# Patient Record
Sex: MALE | Race: WHITE | NOT HISPANIC OR LATINO | URBAN - METROPOLITAN AREA
[De-identification: names, ages, dates, MRNs, and addresses within clinical notes are randomized per-mention and may not be internally consistent; named-entity substitution may affect disease eponyms.]

---

## 2017-02-02 ENCOUNTER — INPATIENT (INPATIENT)
Facility: HOSPITAL | Age: 62
LOS: 25 days | Discharge: NURSING, REHAB OR LONG-TERM CARE | DRG: 291 | End: 2017-02-28
Attending: INTERNAL MEDICINE | Admitting: INTERNAL MEDICINE
Payer: COMMERCIAL

## 2017-02-02 VITALS
TEMPERATURE: 98 F | RESPIRATION RATE: 22 BRPM | SYSTOLIC BLOOD PRESSURE: 194 MMHG | WEIGHT: 315 LBS | OXYGEN SATURATION: 93 % | DIASTOLIC BLOOD PRESSURE: 74 MMHG | HEIGHT: 71.5 IN | HEART RATE: 69 BPM

## 2017-02-02 DIAGNOSIS — R06.02 SHORTNESS OF BREATH: ICD-10-CM

## 2017-02-02 DIAGNOSIS — Z95.5 PRESENCE OF CORONARY ANGIOPLASTY IMPLANT AND GRAFT: Chronic | ICD-10-CM

## 2017-02-02 DIAGNOSIS — Z98.89 OTHER SPECIFIED POSTPROCEDURAL STATES: Chronic | ICD-10-CM

## 2017-02-02 LAB
ACETONE SERPL-MCNC: NEGATIVE — SIGNIFICANT CHANGE UP
ALBUMIN SERPL ELPH-MCNC: 3.2 G/DL — LOW (ref 3.3–5)
ALP SERPL-CCNC: 95 U/L — SIGNIFICANT CHANGE UP (ref 40–120)
ALT FLD-CCNC: 11 U/L RC — SIGNIFICANT CHANGE UP (ref 10–45)
ANION GAP SERPL CALC-SCNC: 13 MMOL/L — SIGNIFICANT CHANGE UP (ref 5–17)
APTT BLD: 63.3 SEC — HIGH (ref 27.5–37.4)
AST SERPL-CCNC: 16 U/L — SIGNIFICANT CHANGE UP (ref 10–40)
BASOPHILS # BLD AUTO: 0 K/UL — SIGNIFICANT CHANGE UP (ref 0–0.2)
BASOPHILS NFR BLD AUTO: 0.3 % — SIGNIFICANT CHANGE UP (ref 0–2)
BILIRUB SERPL-MCNC: 0.1 MG/DL — LOW (ref 0.2–1.2)
BUN SERPL-MCNC: 86 MG/DL — HIGH (ref 7–23)
CALCIUM SERPL-MCNC: 8.8 MG/DL — SIGNIFICANT CHANGE UP (ref 8.4–10.5)
CHLORIDE SERPL-SCNC: 104 MMOL/L — SIGNIFICANT CHANGE UP (ref 96–108)
CO2 SERPL-SCNC: 21 MMOL/L — LOW (ref 22–31)
CREAT SERPL-MCNC: 3.25 MG/DL — HIGH (ref 0.5–1.3)
EOSINOPHIL # BLD AUTO: 0.1 K/UL — SIGNIFICANT CHANGE UP (ref 0–0.5)
EOSINOPHIL NFR BLD AUTO: 1.2 % — SIGNIFICANT CHANGE UP (ref 0–6)
GAS PNL BLDV: SIGNIFICANT CHANGE UP
GLUCOSE SERPL-MCNC: 164 MG/DL — HIGH (ref 70–99)
HCT VFR BLD CALC: 37.7 % — LOW (ref 39–50)
HGB BLD-MCNC: 12.4 G/DL — LOW (ref 13–17)
INR BLD: 1.36 RATIO — HIGH (ref 0.88–1.16)
LYMPHOCYTES # BLD AUTO: 17.7 % — SIGNIFICANT CHANGE UP (ref 13–44)
LYMPHOCYTES # BLD AUTO: 2 K/UL — SIGNIFICANT CHANGE UP (ref 1–3.3)
MCHC RBC-ENTMCNC: 26.9 PG — LOW (ref 27–34)
MCHC RBC-ENTMCNC: 32.9 GM/DL — SIGNIFICANT CHANGE UP (ref 32–36)
MCV RBC AUTO: 81.8 FL — SIGNIFICANT CHANGE UP (ref 80–100)
MONOCYTES # BLD AUTO: 0.9 K/UL — SIGNIFICANT CHANGE UP (ref 0–0.9)
MONOCYTES NFR BLD AUTO: 7.5 % — SIGNIFICANT CHANGE UP (ref 2–14)
NEUTROPHILS # BLD AUTO: 8.5 K/UL — HIGH (ref 1.8–7.4)
NEUTROPHILS NFR BLD AUTO: 73.3 % — SIGNIFICANT CHANGE UP (ref 43–77)
NT-PROBNP SERPL-SCNC: 468 PG/ML — HIGH (ref 0–300)
PLATELET # BLD AUTO: 322 K/UL — SIGNIFICANT CHANGE UP (ref 150–400)
POTASSIUM SERPL-MCNC: 5.3 MMOL/L — SIGNIFICANT CHANGE UP (ref 3.5–5.3)
POTASSIUM SERPL-SCNC: 5.3 MMOL/L — SIGNIFICANT CHANGE UP (ref 3.5–5.3)
PROT SERPL-MCNC: 8.2 G/DL — SIGNIFICANT CHANGE UP (ref 6–8.3)
PROTHROM AB SERPL-ACNC: 14.9 SEC — HIGH (ref 10–13.1)
RBC # BLD: 4.6 M/UL — SIGNIFICANT CHANGE UP (ref 4.2–5.8)
RBC # FLD: 13.2 % — SIGNIFICANT CHANGE UP (ref 10.3–14.5)
SODIUM SERPL-SCNC: 138 MMOL/L — SIGNIFICANT CHANGE UP (ref 135–145)
TROPONIN T SERPL-MCNC: 0.07 NG/ML — HIGH (ref 0–0.06)
WBC # BLD: 11.6 K/UL — HIGH (ref 3.8–10.5)
WBC # FLD AUTO: 11.6 K/UL — HIGH (ref 3.8–10.5)

## 2017-02-02 PROCEDURE — 99285 EMERGENCY DEPT VISIT HI MDM: CPT | Mod: 25

## 2017-02-02 PROCEDURE — 99223 1ST HOSP IP/OBS HIGH 75: CPT

## 2017-02-02 PROCEDURE — 71020: CPT | Mod: 26

## 2017-02-02 PROCEDURE — 93010 ELECTROCARDIOGRAM REPORT: CPT | Mod: NC

## 2017-02-02 RX ORDER — AMLODIPINE BESYLATE 2.5 MG/1
10 TABLET ORAL DAILY
Qty: 0 | Refills: 0 | Status: DISCONTINUED | OUTPATIENT
Start: 2017-02-02 | End: 2017-02-28

## 2017-02-02 RX ORDER — ISOSORBIDE MONONITRATE 60 MG/1
30 TABLET, EXTENDED RELEASE ORAL DAILY
Qty: 0 | Refills: 0 | Status: DISCONTINUED | OUTPATIENT
Start: 2017-02-02 | End: 2017-02-28

## 2017-02-02 RX ORDER — DEXTROSE 50 % IN WATER 50 %
12.5 SYRINGE (ML) INTRAVENOUS ONCE
Qty: 0 | Refills: 0 | Status: DISCONTINUED | OUTPATIENT
Start: 2017-02-02 | End: 2017-02-28

## 2017-02-02 RX ORDER — INSULIN LISPRO 100/ML
VIAL (ML) SUBCUTANEOUS AT BEDTIME
Qty: 0 | Refills: 0 | Status: DISCONTINUED | OUTPATIENT
Start: 2017-02-02 | End: 2017-02-28

## 2017-02-02 RX ORDER — ASPIRIN/CALCIUM CARB/MAGNESIUM 324 MG
81 TABLET ORAL DAILY
Qty: 0 | Refills: 0 | Status: DISCONTINUED | OUTPATIENT
Start: 2017-02-03 | End: 2017-02-16

## 2017-02-02 RX ORDER — SODIUM CHLORIDE 9 MG/ML
3 INJECTION INTRAMUSCULAR; INTRAVENOUS; SUBCUTANEOUS ONCE
Qty: 0 | Refills: 0 | Status: COMPLETED | OUTPATIENT
Start: 2017-02-02 | End: 2017-02-02

## 2017-02-02 RX ORDER — DEXTROSE 50 % IN WATER 50 %
1 SYRINGE (ML) INTRAVENOUS ONCE
Qty: 0 | Refills: 0 | Status: DISCONTINUED | OUTPATIENT
Start: 2017-02-02 | End: 2017-02-28

## 2017-02-02 RX ORDER — DEXTROSE 50 % IN WATER 50 %
25 SYRINGE (ML) INTRAVENOUS ONCE
Qty: 0 | Refills: 0 | Status: DISCONTINUED | OUTPATIENT
Start: 2017-02-02 | End: 2017-02-28

## 2017-02-02 RX ORDER — CHOLECALCIFEROL (VITAMIN D3) 125 MCG
1000 CAPSULE ORAL DAILY
Qty: 0 | Refills: 0 | Status: DISCONTINUED | OUTPATIENT
Start: 2017-02-02 | End: 2017-02-28

## 2017-02-02 RX ORDER — ASPIRIN/CALCIUM CARB/MAGNESIUM 324 MG
81 TABLET ORAL DAILY
Qty: 0 | Refills: 0 | Status: DISCONTINUED | OUTPATIENT
Start: 2017-02-02 | End: 2017-02-02

## 2017-02-02 RX ORDER — INSULIN GLARGINE 100 [IU]/ML
23 INJECTION, SOLUTION SUBCUTANEOUS AT BEDTIME
Qty: 0 | Refills: 0 | Status: DISCONTINUED | OUTPATIENT
Start: 2017-02-03 | End: 2017-02-08

## 2017-02-02 RX ORDER — FUROSEMIDE 40 MG
40 TABLET ORAL ONCE
Qty: 0 | Refills: 0 | Status: COMPLETED | OUTPATIENT
Start: 2017-02-02 | End: 2017-02-02

## 2017-02-02 RX ORDER — DABIGATRAN ETEXILATE MESYLATE 150 MG/1
150 CAPSULE ORAL EVERY 12 HOURS
Qty: 0 | Refills: 0 | Status: DISCONTINUED | OUTPATIENT
Start: 2017-02-02 | End: 2017-02-02

## 2017-02-02 RX ORDER — SODIUM CHLORIDE 9 MG/ML
1000 INJECTION, SOLUTION INTRAVENOUS
Qty: 0 | Refills: 0 | Status: DISCONTINUED | OUTPATIENT
Start: 2017-02-02 | End: 2017-02-28

## 2017-02-02 RX ORDER — FUROSEMIDE 40 MG
40 TABLET ORAL DAILY
Qty: 0 | Refills: 0 | Status: DISCONTINUED | OUTPATIENT
Start: 2017-02-03 | End: 2017-02-03

## 2017-02-02 RX ORDER — INSULIN LISPRO 100/ML
VIAL (ML) SUBCUTANEOUS
Qty: 0 | Refills: 0 | Status: DISCONTINUED | OUTPATIENT
Start: 2017-02-02 | End: 2017-02-28

## 2017-02-02 RX ORDER — SIMVASTATIN 20 MG/1
40 TABLET, FILM COATED ORAL AT BEDTIME
Qty: 0 | Refills: 0 | Status: DISCONTINUED | OUTPATIENT
Start: 2017-02-02 | End: 2017-02-28

## 2017-02-02 RX ORDER — GLUCAGON INJECTION, SOLUTION 0.5 MG/.1ML
1 INJECTION, SOLUTION SUBCUTANEOUS ONCE
Qty: 0 | Refills: 0 | Status: DISCONTINUED | OUTPATIENT
Start: 2017-02-02 | End: 2017-02-28

## 2017-02-02 RX ORDER — ASPIRIN/CALCIUM CARB/MAGNESIUM 324 MG
325 TABLET ORAL ONCE
Qty: 0 | Refills: 0 | Status: COMPLETED | OUTPATIENT
Start: 2017-02-02 | End: 2017-02-02

## 2017-02-02 RX ORDER — METOPROLOL TARTRATE 50 MG
100 TABLET ORAL DAILY
Qty: 0 | Refills: 0 | Status: DISCONTINUED | OUTPATIENT
Start: 2017-02-02 | End: 2017-02-28

## 2017-02-02 RX ADMIN — Medication 40 MILLIGRAM(S): at 20:33

## 2017-02-02 RX ADMIN — Medication 325 MILLIGRAM(S): at 20:33

## 2017-02-02 RX ADMIN — SODIUM CHLORIDE 3 MILLILITER(S): 9 INJECTION INTRAMUSCULAR; INTRAVENOUS; SUBCUTANEOUS at 20:29

## 2017-02-02 NOTE — H&P ADULT. - ATTENDING COMMENTS
NIGHT HOSPITALIST:  Patient/daughter in attendance aware of course and agrees with plan/care as above.  Long term prognosis is guarded.  Emotional support provided to patient.  Care reviewed with covering NP>  Care assumed by Dr. Mazariegos.

## 2017-02-02 NOTE — H&P ADULT. - PROBLEM SELECTOR PLAN 3
Will temporarily HOLD ACE--will have to continue Lasix for patient's symptoms of pulmonary edema.  Would consider formal renal evaluation in AM.  Urine protein/creatinine ratio.  Microalbumin.  Renal US>

## 2017-02-02 NOTE — ED ADULT NURSE NOTE - OBJECTIVE STATEMENT
61 yr old male with h/o stents present to the ER for shortness  for 1 month getting progressively worse. Pt reports that he has gained about 35 lbs and is experiencing swelling in both upper and lower extremities. Pt also report having a non productive  moist cough especially nights. Denies recent travel/ sick exposure. Pt deines chest pain, fever/ palpitations.

## 2017-02-02 NOTE — H&P ADULT. - GASTROINTESTINAL DETAILS
no organomegaly/no bruit/no masses palpable/no rigidity/bowel sounds normal/no guarding/nontender/no rebound tenderness/no distention

## 2017-02-02 NOTE — ED PROVIDER NOTE - PMH
Atrial fibrillation    CAD (coronary artery disease)    DM type 2 (diabetes mellitus, type 2)    HLD (hyperlipidemia)    HTN (hypertension), benign    MI (myocardial infarction)  circa 2014  TIA (transient ischemic attack)

## 2017-02-02 NOTE — ED ADULT NURSE NOTE - PMH
Atrial fibrillation    CAD (coronary artery disease)    DM type 2 (diabetes mellitus, type 2)    HLD (hyperlipidemia)    HTN (hypertension), benign    MI (myocardial infarction)    TIA (transient ischemic attack)

## 2017-02-02 NOTE — H&P ADULT. - ASSESSMENT
NIGHT HOSPITALIST:  Presentation with a month history of progressive anasarca in the setting of CAD, type 2 diabetes mellitus NIGHT HOSPITALIST:  Presentation with a month history of progressive anasarca in the setting of CAD, type 2 diabetes mellitus with PAF on Pradaxa with acute kidney injury with Cr to 3.2 from 1.0 from 13 months ago.  Will temporarily HOLD ACE for now but will cautiously continue Lasix at 40 IV daily>>following Cr.  Will have to DC metformin and sulfonylurea with renal insufficiency and temporarily provide Lantus at 0.15 units/kg/24H at night.  U/A, urine protein/creatinine ratio, microalbumin.  Would consider formal evaluation by renal in the AM.

## 2017-02-02 NOTE — H&P ADULT. - PROBLEM SELECTOR PLAN 4
Patient with poor foot hygiene but presently DOES NOT have cellulitis.  Suspect poor glycemic control at home.  Lantus at conservative 0.15 units/kg/24H.

## 2017-02-02 NOTE — H&P ADULT. - EXTREMITIES COMMENTS
2++ B/L LE edema with minimal erythema but NO warmth, NO crepitus.  Poor B/L nail hygiene with noted imbedded dirt of the soles of the feet.

## 2017-02-02 NOTE — ED PROVIDER NOTE - ATTENDING CONTRIBUTION TO CARE
Dr. Alamo (Attending Physician)  Pt. pw 30-40lbs weight gain, le edema and shortness of breath over past month.  Has ckd and had similar episode in past with worsening kidney function. Denies ho chf.  Denies chest pain, exertional chest pain, nausea, vomiting, diaphoresis.  will check labs, trop, bnp, cxr and admit.

## 2017-02-02 NOTE — H&P ADULT. - PROBLEM SELECTOR PLAN 1
Suspected acute over chronic HF with preserved EF but will repeat echo (last study 13 months ago).  Serial enzymes.  Daily weights.

## 2017-02-02 NOTE — ED PROVIDER NOTE - MEDICAL DECISION MAKING DETAILS
shortness of breath, edema, weight gain, orthopnea, concerning for worsening heart fxn, liver or renal fxn worsening. will obtain cxr, give lasix, ekg, trop, admission for continued work up

## 2017-02-02 NOTE — H&P ADULT. - HISTORY OF PRESENT ILLNESS
NIGHT HOSPITALIST:  Patient UNKNOWN to me previously, assigned to me at this point via the ER and by Dr. Rosen to admit this 60 y/o M--patient seen with daughter in attendance--patient with a history of CAD with past PCI with 3 stents, paroxysmal atrial fibrillation maintained on Pradaxa, essential HTN, type 2 diabetes mellitus on oral medications with presumed diabetic neuropathy, cerebrovascular disease with a CVA in 2014 but no residual sequelae, past CEA of the LEFT carotid with apparent intermittent outpatient treatment for presumed cellulitis of the RIGHT leg for the past 2 weeks, completing multiple oral courses of antibiotics with progressive orthopnea and upper and lower extremity edema.  Patient does not regularly check his FS at home, with patient noting FS in the high 200s.  Presently no chest pain/pressure/dyspnea.  NO HA, no focal weakness.  No abdominal pain, no red blood per rectum or melena.  No back pain, no tearing back pain.  No fever, no chills.  No diarrhea.  Patient with weight gain but anorexia.  No cough, no sputum.  NO hematuria, no dysuria.  Remaining review of systems not contributory.

## 2017-02-02 NOTE — ED ADULT NURSE REASSESSMENT NOTE - NS ED NURSE REASSESS COMMENT FT1
Pt rcv'd from [facility]. Pt presents tachypneic, O2 sat 93-94% on 2L, pt O2 increased to 3L. Pt has 3+ pitting edema of BL upper and lower extremities. RLE has redness present, pt recently treated for RLE cellulitis w/ 10 days abx. No pain, discharge from site at this time. Pt denies CP, n/v, fever/chills, states recent weight gain of 35 lbs over past approx 4 weeks. Unable to obtain IV prior to Xray, will attempt again on pt return.

## 2017-02-02 NOTE — H&P ADULT. - LAB RESULTS AND INTERPRETATION
WBC 11.6.  Hgb 12.4.  Platelets of 322K.  INR 1.3.  K+ 5.3.  Na+ 138.  Random glucose of 164.  Cr 1.0>>3.2.  Alb 3.2.  Troponin non-dx x 1.  e GFR 20.  No U/A obtained by ER.

## 2017-02-02 NOTE — ED ADULT NURSE REASSESSMENT NOTE - NS ED NURSE REASSESS COMMENT FT1
Pt adm. to tele. Report called to [facility] Shen. Pt on 3L O2 via NC due to O2 sat 92-93% room air. Pt resting on stretcher w/ daughter at bedside. No CP, SOB, n/v fever/chills. Safety and comfort maintained while in ED.

## 2017-02-02 NOTE — ED PROVIDER NOTE - OBJECTIVE STATEMENT
61 year old with cad reporting 30-40lbs weight gain, edema shortness of breath, orthopnea over the last 3-4 weeks. symptoms refractory to 40 lasix PO. 1 month ago had cellulitis in right leg (pretibial) with mild resolution with PO (unk) antibiotics outpatient. dyspnea on exertion and shortness of breath while changing positions, and walking 20 steps. not on home o2 but requiring 3L in ER to stay in mid 90s. most recent sugar 270    PMD: rose oneal  cards: mayo madrigal

## 2017-02-02 NOTE — ED PROVIDER NOTE - PHYSICAL EXAMINATION
[facility]: A & O x 3, NAD, HEENT with bull neck, black tongue (2/2 lozenge) otherwise WNL and no facial asymmetry; lungs with diminished sounds bilaterally, heart with reg rhythm without murmur; abdomen soft obese NTND; extremities with right pretibial erythema and warmth, b ilateral 2+ pitting edema on legs; skin with no rashes, neuro exam non focal with no motor or sensory deficits

## 2017-02-03 DIAGNOSIS — I48.91 UNSPECIFIED ATRIAL FIBRILLATION: ICD-10-CM

## 2017-02-03 DIAGNOSIS — E11.9 TYPE 2 DIABETES MELLITUS WITHOUT COMPLICATIONS: ICD-10-CM

## 2017-02-03 DIAGNOSIS — N17.9 ACUTE KIDNEY FAILURE, UNSPECIFIED: ICD-10-CM

## 2017-02-03 DIAGNOSIS — R06.02 SHORTNESS OF BREATH: ICD-10-CM

## 2017-02-03 LAB
ANION GAP SERPL CALC-SCNC: 12 MMOL/L — SIGNIFICANT CHANGE UP (ref 5–17)
APPEARANCE UR: ABNORMAL
APTT BLD: 130.3 SEC — CRITICAL HIGH (ref 27.5–37.4)
APTT BLD: 59.7 SEC — HIGH (ref 27.5–37.4)
BACTERIA # UR AUTO: ABNORMAL /HPF
BASOPHILS # BLD AUTO: 0.03 K/UL — SIGNIFICANT CHANGE UP (ref 0–0.2)
BASOPHILS NFR BLD AUTO: 0.3 % — SIGNIFICANT CHANGE UP (ref 0–2)
BILIRUB UR-MCNC: NEGATIVE — SIGNIFICANT CHANGE UP
BUN SERPL-MCNC: 83 MG/DL — HIGH (ref 7–23)
CALCIUM SERPL-MCNC: 8.7 MG/DL — SIGNIFICANT CHANGE UP (ref 8.4–10.5)
CHLORIDE SERPL-SCNC: 105 MMOL/L — SIGNIFICANT CHANGE UP (ref 96–108)
CK MB BLD-MCNC: 2.9 % — SIGNIFICANT CHANGE UP (ref 0–3.5)
CK MB CFR SERPL CALC: 5.1 NG/ML — SIGNIFICANT CHANGE UP (ref 0–6.7)
CK MB CFR SERPL CALC: 6.3 NG/ML — SIGNIFICANT CHANGE UP (ref 0–6.7)
CK SERPL-CCNC: 174 U/L — SIGNIFICANT CHANGE UP (ref 30–200)
CK SERPL-CCNC: 187 U/L — SIGNIFICANT CHANGE UP (ref 30–200)
CO2 SERPL-SCNC: 24 MMOL/L — SIGNIFICANT CHANGE UP (ref 22–31)
COLOR SPEC: YELLOW — SIGNIFICANT CHANGE UP
CREAT ?TM UR-MCNC: 93 MG/DL — SIGNIFICANT CHANGE UP
CREAT ?TM UR-MCNC: 93 MG/DL — SIGNIFICANT CHANGE UP
CREAT SERPL-MCNC: 3.03 MG/DL — HIGH (ref 0.5–1.3)
DIFF PNL FLD: ABNORMAL
EOSINOPHIL # BLD AUTO: 0.11 K/UL — SIGNIFICANT CHANGE UP (ref 0–0.5)
EOSINOPHIL NFR BLD AUTO: 1 % — SIGNIFICANT CHANGE UP (ref 0–6)
EPI CELLS # UR: SIGNIFICANT CHANGE UP /HPF
GLUCOSE SERPL-MCNC: 127 MG/DL — HIGH (ref 70–99)
GLUCOSE UR QL: NEGATIVE — SIGNIFICANT CHANGE UP
HBA1C BLD-MCNC: 8.6 % — HIGH (ref 4–5.6)
HCT VFR BLD CALC: 35.8 % — LOW (ref 39–50)
HCT VFR BLD CALC: 36.9 % — LOW (ref 39–50)
HCV AB S/CO SERPL IA: 0.09 S/CO — SIGNIFICANT CHANGE UP
HCV AB SERPL-IMP: SIGNIFICANT CHANGE UP
HGB BLD-MCNC: 11.6 G/DL — LOW (ref 13–17)
HGB BLD-MCNC: 11.6 G/DL — LOW (ref 13–17)
HYALINE CASTS # UR AUTO: ABNORMAL
IMM GRANULOCYTES NFR BLD AUTO: 0.3 % — SIGNIFICANT CHANGE UP (ref 0–1.5)
INR BLD: 1.23 RATIO — HIGH (ref 0.88–1.16)
KETONES UR-MCNC: NEGATIVE — SIGNIFICANT CHANGE UP
LEUKOCYTE ESTERASE UR-ACNC: ABNORMAL
LYMPHOCYTES # BLD AUTO: 1.9 K/UL — SIGNIFICANT CHANGE UP (ref 1–3.3)
LYMPHOCYTES # BLD AUTO: 16.5 % — SIGNIFICANT CHANGE UP (ref 13–44)
MCHC RBC-ENTMCNC: 26 PG — LOW (ref 27–34)
MCHC RBC-ENTMCNC: 26.5 PG — LOW (ref 27–34)
MCHC RBC-ENTMCNC: 31.4 GM/DL — LOW (ref 32–36)
MCHC RBC-ENTMCNC: 32.5 GM/DL — SIGNIFICANT CHANGE UP (ref 32–36)
MCV RBC AUTO: 81.6 FL — SIGNIFICANT CHANGE UP (ref 80–100)
MCV RBC AUTO: 82.6 FL — SIGNIFICANT CHANGE UP (ref 80–100)
MICROALBUMIN UR-MCNC: 202.8 MG/DL — SIGNIFICANT CHANGE UP
MICROALBUMIN/CREAT UR-RTO: 2181 UG/MG — HIGH (ref 0–30)
MONOCYTES # BLD AUTO: 1.08 K/UL — HIGH (ref 0–0.9)
MONOCYTES NFR BLD AUTO: 9.4 % — SIGNIFICANT CHANGE UP (ref 2–14)
NEUTROPHILS # BLD AUTO: 8.37 K/UL — HIGH (ref 1.8–7.4)
NEUTROPHILS NFR BLD AUTO: 72.5 % — SIGNIFICANT CHANGE UP (ref 43–77)
NITRITE UR-MCNC: NEGATIVE — SIGNIFICANT CHANGE UP
NT-PROBNP SERPL-SCNC: 466 PG/ML — HIGH (ref 0–300)
PH UR: 6 — SIGNIFICANT CHANGE UP (ref 4.8–8)
PLATELET # BLD AUTO: 300 K/UL — SIGNIFICANT CHANGE UP (ref 150–400)
PLATELET # BLD AUTO: 355 K/UL — SIGNIFICANT CHANGE UP (ref 150–400)
POTASSIUM SERPL-MCNC: 5.7 MMOL/L — HIGH (ref 3.5–5.3)
POTASSIUM SERPL-SCNC: 5.7 MMOL/L — HIGH (ref 3.5–5.3)
PROT ?TM UR-MCNC: 357 MG/DL — HIGH (ref 0–12)
PROT UR-MCNC: 300 MG/DL
PROT/CREAT UR-RTO: 3.8 RATIO — HIGH (ref 0–0.2)
PROTHROM AB SERPL-ACNC: 13.9 SEC — HIGH (ref 10–13.1)
RBC # BLD: 4.39 M/UL — SIGNIFICANT CHANGE UP (ref 4.2–5.8)
RBC # BLD: 4.47 M/UL — SIGNIFICANT CHANGE UP (ref 4.2–5.8)
RBC # FLD: 13.7 % — SIGNIFICANT CHANGE UP (ref 10.3–14.5)
RBC # FLD: 14.2 % — SIGNIFICANT CHANGE UP (ref 10.3–14.5)
RBC CASTS # UR COMP ASSIST: >50 /HPF (ref 0–2)
SODIUM SERPL-SCNC: 141 MMOL/L — SIGNIFICANT CHANGE UP (ref 135–145)
SP GR SPEC: 1.01 — SIGNIFICANT CHANGE UP (ref 1.01–1.02)
TROPONIN T SERPL-MCNC: 0.06 NG/ML — SIGNIFICANT CHANGE UP (ref 0–0.06)
TROPONIN T SERPL-MCNC: 0.08 NG/ML — HIGH (ref 0–0.06)
UROBILINOGEN FLD QL: NEGATIVE — SIGNIFICANT CHANGE UP
WBC # BLD: 10.4 K/UL — SIGNIFICANT CHANGE UP (ref 3.8–10.5)
WBC # BLD: 11.53 K/UL — HIGH (ref 3.8–10.5)
WBC # FLD AUTO: 10.4 K/UL — SIGNIFICANT CHANGE UP (ref 3.8–10.5)
WBC # FLD AUTO: 11.53 K/UL — HIGH (ref 3.8–10.5)
WBC UR QL: SIGNIFICANT CHANGE UP /HPF (ref 0–5)

## 2017-02-03 PROCEDURE — 93970 EXTREMITY STUDY: CPT | Mod: 26

## 2017-02-03 PROCEDURE — 76775 US EXAM ABDO BACK WALL LIM: CPT | Mod: 26

## 2017-02-03 RX ORDER — FUROSEMIDE 40 MG
40 TABLET ORAL
Qty: 0 | Refills: 0 | Status: DISCONTINUED | OUTPATIENT
Start: 2017-02-03 | End: 2017-02-04

## 2017-02-03 RX ORDER — HEPARIN SODIUM 5000 [USP'U]/ML
5000 INJECTION INTRAVENOUS; SUBCUTANEOUS EVERY 6 HOURS
Qty: 0 | Refills: 0 | Status: DISCONTINUED | OUTPATIENT
Start: 2017-02-03 | End: 2017-02-10

## 2017-02-03 RX ORDER — HYDRALAZINE HCL 50 MG
25 TABLET ORAL ONCE
Qty: 0 | Refills: 0 | Status: COMPLETED | OUTPATIENT
Start: 2017-02-03 | End: 2017-02-03

## 2017-02-03 RX ORDER — HYDRALAZINE HCL 50 MG
25 TABLET ORAL
Qty: 0 | Refills: 0 | Status: DISCONTINUED | OUTPATIENT
Start: 2017-02-03 | End: 2017-02-04

## 2017-02-03 RX ORDER — SODIUM POLYSTYRENE SULFONATE 4.1 MEQ/G
15 POWDER, FOR SUSPENSION ORAL ONCE
Qty: 0 | Refills: 0 | Status: COMPLETED | OUTPATIENT
Start: 2017-02-03 | End: 2017-02-03

## 2017-02-03 RX ORDER — HEPARIN SODIUM 5000 [USP'U]/ML
INJECTION INTRAVENOUS; SUBCUTANEOUS
Qty: 25000 | Refills: 0 | Status: DISCONTINUED | OUTPATIENT
Start: 2017-02-03 | End: 2017-02-10

## 2017-02-03 RX ORDER — HEPARIN SODIUM 5000 [USP'U]/ML
10000 INJECTION INTRAVENOUS; SUBCUTANEOUS EVERY 6 HOURS
Qty: 0 | Refills: 0 | Status: DISCONTINUED | OUTPATIENT
Start: 2017-02-03 | End: 2017-02-10

## 2017-02-03 RX ADMIN — AMLODIPINE BESYLATE 10 MILLIGRAM(S): 2.5 TABLET ORAL at 06:07

## 2017-02-03 RX ADMIN — ISOSORBIDE MONONITRATE 30 MILLIGRAM(S): 60 TABLET, EXTENDED RELEASE ORAL at 10:52

## 2017-02-03 RX ADMIN — Medication 100 MILLIGRAM(S): at 17:29

## 2017-02-03 RX ADMIN — SIMVASTATIN 40 MILLIGRAM(S): 20 TABLET, FILM COATED ORAL at 22:45

## 2017-02-03 RX ADMIN — Medication 25 MILLIGRAM(S): at 23:08

## 2017-02-03 RX ADMIN — Medication 100 MILLIGRAM(S): at 06:07

## 2017-02-03 RX ADMIN — SODIUM POLYSTYRENE SULFONATE 15 GRAM(S): 4.1 POWDER, FOR SUSPENSION ORAL at 17:29

## 2017-02-03 RX ADMIN — Medication 2: at 17:29

## 2017-02-03 RX ADMIN — INSULIN GLARGINE 23 UNIT(S): 100 INJECTION, SOLUTION SUBCUTANEOUS at 22:46

## 2017-02-03 RX ADMIN — Medication 81 MILLIGRAM(S): at 10:52

## 2017-02-03 RX ADMIN — HEPARIN SODIUM 2400 UNIT(S)/HR: 5000 INJECTION INTRAVENOUS; SUBCUTANEOUS at 10:52

## 2017-02-03 RX ADMIN — Medication 25 MILLIGRAM(S): at 17:29

## 2017-02-03 RX ADMIN — Medication 40 MILLIGRAM(S): at 06:06

## 2017-02-03 RX ADMIN — HEPARIN SODIUM 0 UNIT(S)/HR: 5000 INJECTION INTRAVENOUS; SUBCUTANEOUS at 18:54

## 2017-02-03 RX ADMIN — Medication 40 MILLIGRAM(S): at 18:34

## 2017-02-03 RX ADMIN — Medication 1: at 12:43

## 2017-02-03 RX ADMIN — Medication 1000 UNIT(S): at 10:52

## 2017-02-03 RX ADMIN — HEPARIN SODIUM 2000 UNIT(S)/HR: 5000 INJECTION INTRAVENOUS; SUBCUTANEOUS at 20:03

## 2017-02-03 NOTE — DIETITIAN INITIAL EVALUATION ADULT. - DIET TYPE
no concentrated phosphorus/no concentrated potassium/consistent carbohydrate (no snacks)/low sodium/DASH/TLC (sodium and cholesterol restricted diet)

## 2017-02-03 NOTE — DIETITIAN INITIAL EVALUATION ADULT. - ADHERENCE
poor/Patient expresses awareness of consistent carbohydrate diet however denies following any specific diet at home, states he checks his fingersticks one time per day, in the morning before breakfast with typical ranges between 150-180 mg/dl.

## 2017-02-03 NOTE — DIETITIAN INITIAL EVALUATION ADULT. - SOURCE
other (specify)/family/significant other/patient/Review of patient's medical chart; previous RD notes; patient's wife at bedside

## 2017-02-03 NOTE — DIETITIAN INITIAL EVALUATION ADULT. - NS AS NUTRI INTERV MEALS SNACK
General/healthful diet/Recommend continue current diet (consistent carbohydrate, DASH TLC, no concentrated potassium or phosphorus, low sodium)/Other (specify) General/healthful diet/Recommend continue current diet (consistent carbohydrate, DASH TLC, no concentrated potassium or phosphorus, low sodium). Will request phosphorus levels, if within normal limits, liberalize phosphorus restriction./Other (specify)

## 2017-02-03 NOTE — DIETITIAN INITIAL EVALUATION ADULT. - ENERGY NEEDS
Daily weight (2/3/17): 338.8 pounds Height: 5'11.5"   BMI: 46.6 kg/m^2   Ideal body weight: 175 pounds, 194% ideal body weight  Edema: 3+ right and left foot; 3+ right and left leg. No pressure ulcers noted.  Patient admitted with 4 weeks of bilateral LE and upper extremity edema and orthopnea; currently on lasix, suspect poor glycemic control, acute on chronic CHF. Patient with history of type 2 diabetes.

## 2017-02-03 NOTE — DIETITIAN INITIAL EVALUATION ADULT. - NS AS NUTRI INTERV ED CONTENT3
Priority modifications/Recommended modifications/Other (specify)/Discussed portion sizes for weight management. Will remain available to provide additional education as needed.

## 2017-02-03 NOTE — DIETITIAN INITIAL EVALUATION ADULT. - PERTINENT MEDS FT
Lasix, Lantus, Zaroxolyn, Vitamin D3, Humalog Sliding Scale Lasix, Lantus, Zaroxolyn, Vitamin D3, Humalog Sliding Scale, Kayexalate

## 2017-02-03 NOTE — DIETITIAN INITIAL EVALUATION ADULT. - OTHER INFO
Nutrition assessment initiated for consult for BMI >40kg/m^2. Currently, patient reports good appetite, eating >75% of meals. Patient denies nausea, vomiting, diarrhea, constipation and states no difficulties chewing or swallowing. Patient states no known food allergies.

## 2017-02-03 NOTE — DIETITIAN INITIAL EVALUATION ADULT. - NS AS NUTRI INTERV COLLABORAT3
Collaboration with other providers/Malnutrition sticker placed in chart for BMI 46.6 kg/m^2. Will remain available as needed.

## 2017-02-03 NOTE — DIETITIAN INITIAL EVALUATION ADULT. - SIGNS/SYMPTOMS
patient's report of large portion sizes, BMI 46.6kg/m^2 (morbid obesity) patient's report of large portion sizes, BMI 46.6kg/m^2 (morbid obesity), HbA1c 8.6%

## 2017-02-03 NOTE — DIETITIAN INITIAL EVALUATION ADULT. - NS AS NUTRI INTERV ED CONTENT
Nutrition relationship to health/disease/Recommended modifications/Purpose of the nutrition education/Provided patient with verbal and written materials on consistent carbohydrate diet. Discussed eating carbohydrates consistently throughout the day, pairing carbohydrates with protein, types of carbohydrates to maintain steady blood glucose levels. Discussed portion sizes for weight management. Will remain available to provide additional education as needed./Other or related topics/Other (specify) Recommended modifications/Purpose of the nutrition education/Provided patient with verbal and written materials on consistent carbohydrate diet. Discussed eating carbohydrates consistently throughout the day, pairing carbohydrates with protein, types of carbohydrates to maintain steady blood glucose levels./Other or related topics/Other (specify)/Nutrition relationship to health/disease

## 2017-02-03 NOTE — DIETITIAN INITIAL EVALUATION ADULT. - ORAL INTAKE PTA
good/Patient states he has a good appetite at home and eats 3 meals per day with snacks in between, typical diet as follows: breakfast: 1-1.5 cups cereal with 2% milk and strawberries or blueberries, lunch and dinner vary but typically consist of homemade soup or pasta or chicken cutlets; patient admits to large portion sizes. Patient denies taking any supplements currently.

## 2017-02-03 NOTE — DIETITIAN INITIAL EVALUATION ADULT. - PHYSICAL APPEARANCE
obese/Per previous RD notes patient's weights listed as 280 pounds (8/30/15) and 311.7 pounds (1/19/16), currently patient's daily weight noted as 338.8 pounds (2/3/17). Noted, patient currently on lasix./other (specify)

## 2017-02-04 LAB
ANION GAP SERPL CALC-SCNC: 14 MMOL/L — SIGNIFICANT CHANGE UP (ref 5–17)
APTT BLD: 72.4 SEC — HIGH (ref 27.5–37.4)
APTT BLD: 75.8 SEC — HIGH (ref 27.5–37.4)
BUN SERPL-MCNC: 85 MG/DL — HIGH (ref 7–23)
CALCIUM SERPL-MCNC: 8.4 MG/DL — SIGNIFICANT CHANGE UP (ref 8.4–10.5)
CHLORIDE SERPL-SCNC: 101 MMOL/L — SIGNIFICANT CHANGE UP (ref 96–108)
CO2 SERPL-SCNC: 22 MMOL/L — SIGNIFICANT CHANGE UP (ref 22–31)
CREAT SERPL-MCNC: 3.05 MG/DL — HIGH (ref 0.5–1.3)
GLUCOSE SERPL-MCNC: 299 MG/DL — HIGH (ref 70–99)
HCT VFR BLD CALC: 37.4 % — LOW (ref 39–50)
HGB BLD-MCNC: 11.6 G/DL — LOW (ref 13–17)
MCHC RBC-ENTMCNC: 26 PG — LOW (ref 27–34)
MCHC RBC-ENTMCNC: 31 GM/DL — LOW (ref 32–36)
MCV RBC AUTO: 83.7 FL — SIGNIFICANT CHANGE UP (ref 80–100)
PLATELET # BLD AUTO: 359 K/UL — SIGNIFICANT CHANGE UP (ref 150–400)
POTASSIUM SERPL-MCNC: 5.2 MMOL/L — SIGNIFICANT CHANGE UP (ref 3.5–5.3)
POTASSIUM SERPL-SCNC: 5.2 MMOL/L — SIGNIFICANT CHANGE UP (ref 3.5–5.3)
RAPID RVP RESULT: SIGNIFICANT CHANGE UP
RBC # BLD: 4.47 M/UL — SIGNIFICANT CHANGE UP (ref 4.2–5.8)
RBC # FLD: 14.1 % — SIGNIFICANT CHANGE UP (ref 10.3–14.5)
SODIUM SERPL-SCNC: 137 MMOL/L — SIGNIFICANT CHANGE UP (ref 135–145)
WBC # BLD: 12.5 K/UL — HIGH (ref 3.8–10.5)
WBC # FLD AUTO: 12.5 K/UL — HIGH (ref 3.8–10.5)

## 2017-02-04 PROCEDURE — 71250 CT THORAX DX C-: CPT | Mod: 26

## 2017-02-04 RX ORDER — FUROSEMIDE 40 MG
80 TABLET ORAL
Qty: 0 | Refills: 0 | Status: DISCONTINUED | OUTPATIENT
Start: 2017-02-04 | End: 2017-02-11

## 2017-02-04 RX ORDER — HYDRALAZINE HCL 50 MG
50 TABLET ORAL
Qty: 0 | Refills: 0 | Status: DISCONTINUED | OUTPATIENT
Start: 2017-02-04 | End: 2017-02-05

## 2017-02-04 RX ADMIN — INSULIN GLARGINE 23 UNIT(S): 100 INJECTION, SOLUTION SUBCUTANEOUS at 21:50

## 2017-02-04 RX ADMIN — AMLODIPINE BESYLATE 10 MILLIGRAM(S): 2.5 TABLET ORAL at 06:46

## 2017-02-04 RX ADMIN — HEPARIN SODIUM 2000 UNIT(S)/HR: 5000 INJECTION INTRAVENOUS; SUBCUTANEOUS at 02:34

## 2017-02-04 RX ADMIN — Medication 1000 UNIT(S): at 11:17

## 2017-02-04 RX ADMIN — Medication 2: at 17:16

## 2017-02-04 RX ADMIN — Medication 100 MILLIGRAM(S): at 06:46

## 2017-02-04 RX ADMIN — Medication 50 MILLIGRAM(S): at 17:18

## 2017-02-04 RX ADMIN — Medication 1: at 09:04

## 2017-02-04 RX ADMIN — Medication 40 MILLIGRAM(S): at 06:45

## 2017-02-04 RX ADMIN — SIMVASTATIN 40 MILLIGRAM(S): 20 TABLET, FILM COATED ORAL at 21:07

## 2017-02-04 RX ADMIN — Medication 80 MILLIGRAM(S): at 17:18

## 2017-02-04 RX ADMIN — Medication 3: at 12:48

## 2017-02-04 RX ADMIN — Medication 25 MILLIGRAM(S): at 06:46

## 2017-02-04 RX ADMIN — Medication 81 MILLIGRAM(S): at 11:17

## 2017-02-04 RX ADMIN — ISOSORBIDE MONONITRATE 30 MILLIGRAM(S): 60 TABLET, EXTENDED RELEASE ORAL at 11:17

## 2017-02-04 RX ADMIN — HEPARIN SODIUM 2000 UNIT(S)/HR: 5000 INJECTION INTRAVENOUS; SUBCUTANEOUS at 11:15

## 2017-02-04 RX ADMIN — Medication 100 MILLIGRAM(S): at 17:25

## 2017-02-04 NOTE — PHYSICAL THERAPY INITIAL EVALUATION ADULT - ADDITIONAL COMMENTS
Pt resides with spouse and family in private house  which has  3steps to enter HR present .PTA  he was independent in all his ADL's and use a cane with ambulation Pt resides with spouse and family in private house  which has  3steps to enter HR present .PTA  he was independent in all his ADL's and use cane for ambulation

## 2017-02-04 NOTE — PHYSICAL THERAPY INITIAL EVALUATION ADULT - PERTINENT HX OF CURRENT PROBLEM, REHAB EVAL
61 yr M with CAD reporting 30-40lbs weight gain, edema shortness of breath, orthopnea over the last 3-4 weeks. symptoms refractory to 40 lasix PO. 1 month ago had cellulitis in right leg (pretibial) with mild resolution with PO (unk) antibiotics outpatient. dyspnea on exertion and shortness of breath. with pmhx of CAD,CVA. DM, HTN, TIA. proxysmal afib. result shows decreased h/h, increased BUN, CXR: Small left pleural effusion with adjacent atelectasis.

## 2017-02-05 LAB
AMMONIA BLD-MCNC: 29 UMOL/L — SIGNIFICANT CHANGE UP (ref 11–55)
ANION GAP SERPL CALC-SCNC: 14 MMOL/L — SIGNIFICANT CHANGE UP (ref 5–17)
APTT BLD: 24.2 SEC — LOW (ref 27.5–37.4)
APTT BLD: 50.6 SEC — HIGH (ref 27.5–37.4)
APTT BLD: 61.9 SEC — HIGH (ref 27.5–37.4)
BASE EXCESS BLDA CALC-SCNC: 0.7 MMOL/L — SIGNIFICANT CHANGE UP (ref -2–2)
BUN SERPL-MCNC: 88 MG/DL — HIGH (ref 7–23)
CALCIUM SERPL-MCNC: 8.3 MG/DL — LOW (ref 8.4–10.5)
CHLORIDE SERPL-SCNC: 100 MMOL/L — SIGNIFICANT CHANGE UP (ref 96–108)
CO2 BLDA-SCNC: 28 MMOL/L — SIGNIFICANT CHANGE UP (ref 22–30)
CO2 SERPL-SCNC: 24 MMOL/L — SIGNIFICANT CHANGE UP (ref 22–31)
CREAT SERPL-MCNC: 3.13 MG/DL — HIGH (ref 0.5–1.3)
GAS PNL BLDA: SIGNIFICANT CHANGE UP
GLUCOSE SERPL-MCNC: 252 MG/DL — HIGH (ref 70–99)
HBV SURFACE AB SER-ACNC: SIGNIFICANT CHANGE UP
HBV SURFACE AG SER-ACNC: SIGNIFICANT CHANGE UP
HCO3 BLDA-SCNC: 26 MMOL/L — SIGNIFICANT CHANGE UP (ref 21–29)
HCT VFR BLD CALC: 35.1 % — LOW (ref 39–50)
HGB BLD-MCNC: 10.7 G/DL — LOW (ref 13–17)
HOROWITZ INDEX BLDA+IHG-RTO: 35 — SIGNIFICANT CHANGE UP
LEGIONELLA AG UR QL: NEGATIVE — SIGNIFICANT CHANGE UP
MCHC RBC-ENTMCNC: 25.6 PG — LOW (ref 27–34)
MCHC RBC-ENTMCNC: 30.5 GM/DL — LOW (ref 32–36)
MCV RBC AUTO: 84 FL — SIGNIFICANT CHANGE UP (ref 80–100)
PCO2 BLDA: 50 MMHG — HIGH (ref 32–46)
PH BLDA: 7.34 — LOW (ref 7.35–7.45)
PLATELET # BLD AUTO: 328 K/UL — SIGNIFICANT CHANGE UP (ref 150–400)
PO2 BLDA: 71 MMHG — LOW (ref 74–108)
POTASSIUM SERPL-MCNC: 4.8 MMOL/L — SIGNIFICANT CHANGE UP (ref 3.5–5.3)
POTASSIUM SERPL-SCNC: 4.8 MMOL/L — SIGNIFICANT CHANGE UP (ref 3.5–5.3)
RBC # BLD: 4.18 M/UL — LOW (ref 4.2–5.8)
RBC # FLD: 14 % — SIGNIFICANT CHANGE UP (ref 10.3–14.5)
SAO2 % BLDA: 95 % — SIGNIFICANT CHANGE UP (ref 92–96)
SODIUM SERPL-SCNC: 138 MMOL/L — SIGNIFICANT CHANGE UP (ref 135–145)
WBC # BLD: 12.44 K/UL — HIGH (ref 3.8–10.5)
WBC # FLD AUTO: 12.44 K/UL — HIGH (ref 3.8–10.5)

## 2017-02-05 PROCEDURE — 70450 CT HEAD/BRAIN W/O DYE: CPT | Mod: 26

## 2017-02-05 RX ORDER — HYDRALAZINE HCL 50 MG
75 TABLET ORAL THREE TIMES A DAY
Qty: 0 | Refills: 0 | Status: DISCONTINUED | OUTPATIENT
Start: 2017-02-05 | End: 2017-02-28

## 2017-02-05 RX ORDER — DOXAZOSIN MESYLATE 4 MG
2 TABLET ORAL AT BEDTIME
Qty: 0 | Refills: 0 | Status: DISCONTINUED | OUTPATIENT
Start: 2017-02-05 | End: 2017-02-28

## 2017-02-05 RX ADMIN — Medication 1: at 08:21

## 2017-02-05 RX ADMIN — Medication 3: at 12:13

## 2017-02-05 RX ADMIN — HEPARIN SODIUM 2300 UNIT(S)/HR: 5000 INJECTION INTRAVENOUS; SUBCUTANEOUS at 21:04

## 2017-02-05 RX ADMIN — Medication 80 MILLIGRAM(S): at 17:24

## 2017-02-05 RX ADMIN — Medication 3: at 17:20

## 2017-02-05 RX ADMIN — Medication 1000 UNIT(S): at 12:11

## 2017-02-05 RX ADMIN — Medication 75 MILLIGRAM(S): at 12:13

## 2017-02-05 RX ADMIN — Medication 100 MILLIGRAM(S): at 17:23

## 2017-02-05 RX ADMIN — Medication 50 MILLIGRAM(S): at 06:01

## 2017-02-05 RX ADMIN — HEPARIN SODIUM 5000 UNIT(S): 5000 INJECTION INTRAVENOUS; SUBCUTANEOUS at 12:11

## 2017-02-05 RX ADMIN — AMLODIPINE BESYLATE 10 MILLIGRAM(S): 2.5 TABLET ORAL at 06:02

## 2017-02-05 RX ADMIN — Medication 75 MILLIGRAM(S): at 22:47

## 2017-02-05 RX ADMIN — HEPARIN SODIUM 2300 UNIT(S)/HR: 5000 INJECTION INTRAVENOUS; SUBCUTANEOUS at 12:10

## 2017-02-05 RX ADMIN — ISOSORBIDE MONONITRATE 30 MILLIGRAM(S): 60 TABLET, EXTENDED RELEASE ORAL at 12:12

## 2017-02-05 RX ADMIN — Medication 80 MILLIGRAM(S): at 06:50

## 2017-02-05 RX ADMIN — Medication 100 MILLIGRAM(S): at 06:03

## 2017-02-05 RX ADMIN — Medication 100 MILLIGRAM(S): at 06:02

## 2017-02-05 RX ADMIN — SIMVASTATIN 40 MILLIGRAM(S): 20 TABLET, FILM COATED ORAL at 22:47

## 2017-02-05 RX ADMIN — INSULIN GLARGINE 23 UNIT(S): 100 INJECTION, SOLUTION SUBCUTANEOUS at 22:50

## 2017-02-05 RX ADMIN — Medication 81 MILLIGRAM(S): at 12:11

## 2017-02-05 RX ADMIN — Medication 2 MILLIGRAM(S): at 22:47

## 2017-02-06 LAB
AMMONIA BLD-MCNC: 26 UMOL/L — SIGNIFICANT CHANGE UP (ref 11–55)
AMMONIA BLD-MCNC: 29 UMOL/L — SIGNIFICANT CHANGE UP (ref 11–55)
ANION GAP SERPL CALC-SCNC: 14 MMOL/L — SIGNIFICANT CHANGE UP (ref 5–17)
APTT BLD: 68 SEC — HIGH (ref 27.5–37.4)
BUN SERPL-MCNC: 95 MG/DL — HIGH (ref 7–23)
CALCIUM SERPL-MCNC: 8 MG/DL — LOW (ref 8.4–10.5)
CHLORIDE SERPL-SCNC: 99 MMOL/L — SIGNIFICANT CHANGE UP (ref 96–108)
CO2 SERPL-SCNC: 24 MMOL/L — SIGNIFICANT CHANGE UP (ref 22–31)
CREAT SERPL-MCNC: 3.29 MG/DL — HIGH (ref 0.5–1.3)
GLUCOSE SERPL-MCNC: 160 MG/DL — HIGH (ref 70–99)
HCT VFR BLD CALC: 30.8 % — LOW (ref 39–50)
HGB BLD-MCNC: 9.4 G/DL — LOW (ref 13–17)
MCHC RBC-ENTMCNC: 25.1 PG — LOW (ref 27–34)
MCHC RBC-ENTMCNC: 30.5 GM/DL — LOW (ref 32–36)
MCV RBC AUTO: 82.4 FL — SIGNIFICANT CHANGE UP (ref 80–100)
MYELOPEROXIDASE AB SER-ACNC: <5 UNITS — SIGNIFICANT CHANGE UP
MYELOPEROXIDASE CELLS FLD QL: NEGATIVE — SIGNIFICANT CHANGE UP
PLATELET # BLD AUTO: 294 K/UL — SIGNIFICANT CHANGE UP (ref 150–400)
POTASSIUM SERPL-MCNC: 4.8 MMOL/L — SIGNIFICANT CHANGE UP (ref 3.5–5.3)
POTASSIUM SERPL-SCNC: 4.8 MMOL/L — SIGNIFICANT CHANGE UP (ref 3.5–5.3)
PROTEINASE3 AB FLD-ACNC: <5 UNITS — SIGNIFICANT CHANGE UP
PROTEINASE3 AB SER-ACNC: NEGATIVE — SIGNIFICANT CHANGE UP
RBC # BLD: 3.74 M/UL — LOW (ref 4.2–5.8)
RBC # FLD: 14.2 % — SIGNIFICANT CHANGE UP (ref 10.3–14.5)
SODIUM SERPL-SCNC: 137 MMOL/L — SIGNIFICANT CHANGE UP (ref 135–145)
WBC # BLD: 10.78 K/UL — HIGH (ref 3.8–10.5)
WBC # FLD AUTO: 10.78 K/UL — HIGH (ref 3.8–10.5)

## 2017-02-06 RX ORDER — INSULIN LISPRO 100/ML
3 VIAL (ML) SUBCUTANEOUS
Qty: 0 | Refills: 0 | Status: DISCONTINUED | OUTPATIENT
Start: 2017-02-06 | End: 2017-02-08

## 2017-02-06 RX ADMIN — Medication 75 MILLIGRAM(S): at 15:16

## 2017-02-06 RX ADMIN — Medication 80 MILLIGRAM(S): at 06:50

## 2017-02-06 RX ADMIN — Medication 2 MILLIGRAM(S): at 21:38

## 2017-02-06 RX ADMIN — Medication 75 MILLIGRAM(S): at 06:20

## 2017-02-06 RX ADMIN — Medication 3 UNIT(S): at 17:11

## 2017-02-06 RX ADMIN — Medication 1: at 08:19

## 2017-02-06 RX ADMIN — Medication 100 MILLIGRAM(S): at 06:20

## 2017-02-06 RX ADMIN — SIMVASTATIN 40 MILLIGRAM(S): 20 TABLET, FILM COATED ORAL at 21:38

## 2017-02-06 RX ADMIN — AMLODIPINE BESYLATE 10 MILLIGRAM(S): 2.5 TABLET ORAL at 06:20

## 2017-02-06 RX ADMIN — Medication 1: at 17:11

## 2017-02-06 RX ADMIN — ISOSORBIDE MONONITRATE 30 MILLIGRAM(S): 60 TABLET, EXTENDED RELEASE ORAL at 12:29

## 2017-02-06 RX ADMIN — INSULIN GLARGINE 23 UNIT(S): 100 INJECTION, SOLUTION SUBCUTANEOUS at 21:40

## 2017-02-06 RX ADMIN — Medication 75 MILLIGRAM(S): at 21:38

## 2017-02-06 RX ADMIN — Medication 81 MILLIGRAM(S): at 12:30

## 2017-02-06 RX ADMIN — Medication 80 MILLIGRAM(S): at 17:14

## 2017-02-06 RX ADMIN — Medication 2: at 21:38

## 2017-02-06 RX ADMIN — HEPARIN SODIUM 2300 UNIT(S)/HR: 5000 INJECTION INTRAVENOUS; SUBCUTANEOUS at 04:01

## 2017-02-06 RX ADMIN — Medication 1000 UNIT(S): at 12:29

## 2017-02-07 LAB
ANION GAP SERPL CALC-SCNC: 14 MMOL/L — SIGNIFICANT CHANGE UP (ref 5–17)
APTT BLD: 67.5 SEC — HIGH (ref 27.5–37.4)
APTT BLD: 74 SEC — HIGH (ref 27.5–37.4)
BUN SERPL-MCNC: 97 MG/DL — HIGH (ref 7–23)
C3 SERPL-MCNC: 123 MG/DL — SIGNIFICANT CHANGE UP (ref 80–180)
C4 SERPL-MCNC: 33 MG/DL — SIGNIFICANT CHANGE UP (ref 10–45)
CALCIUM SERPL-MCNC: 8 MG/DL — LOW (ref 8.4–10.5)
CHLORIDE SERPL-SCNC: 95 MMOL/L — LOW (ref 96–108)
CO2 SERPL-SCNC: 24 MMOL/L — SIGNIFICANT CHANGE UP (ref 22–31)
CREAT SERPL-MCNC: 3.17 MG/DL — HIGH (ref 0.5–1.3)
DSDNA AB SER-ACNC: <12 IU/ML — SIGNIFICANT CHANGE UP
GLUCOSE SERPL-MCNC: 251 MG/DL — HIGH (ref 70–99)
HCT VFR BLD CALC: 29.9 % — LOW (ref 39–50)
HGB BLD-MCNC: 9.3 G/DL — LOW (ref 13–17)
MCHC RBC-ENTMCNC: 25.8 PG — LOW (ref 27–34)
MCHC RBC-ENTMCNC: 31.1 GM/DL — LOW (ref 32–36)
MCV RBC AUTO: 82.8 FL — SIGNIFICANT CHANGE UP (ref 80–100)
PLATELET # BLD AUTO: 279 K/UL — SIGNIFICANT CHANGE UP (ref 150–400)
POTASSIUM SERPL-MCNC: 4.7 MMOL/L — SIGNIFICANT CHANGE UP (ref 3.5–5.3)
POTASSIUM SERPL-SCNC: 4.7 MMOL/L — SIGNIFICANT CHANGE UP (ref 3.5–5.3)
RBC # BLD: 3.61 M/UL — LOW (ref 4.2–5.8)
RBC # FLD: 14.2 % — SIGNIFICANT CHANGE UP (ref 10.3–14.5)
SODIUM SERPL-SCNC: 133 MMOL/L — LOW (ref 135–145)
WBC # BLD: 11.56 K/UL — HIGH (ref 3.8–10.5)
WBC # FLD AUTO: 11.56 K/UL — HIGH (ref 3.8–10.5)

## 2017-02-07 PROCEDURE — 93306 TTE W/DOPPLER COMPLETE: CPT | Mod: 26

## 2017-02-07 PROCEDURE — 74176 CT ABD & PELVIS W/O CONTRAST: CPT | Mod: 26

## 2017-02-07 RX ADMIN — Medication 100 MILLIGRAM(S): at 06:00

## 2017-02-07 RX ADMIN — Medication: at 08:44

## 2017-02-07 RX ADMIN — ISOSORBIDE MONONITRATE 30 MILLIGRAM(S): 60 TABLET, EXTENDED RELEASE ORAL at 12:25

## 2017-02-07 RX ADMIN — Medication 80 MILLIGRAM(S): at 06:01

## 2017-02-07 RX ADMIN — Medication 2: at 22:13

## 2017-02-07 RX ADMIN — Medication 80 MILLIGRAM(S): at 18:18

## 2017-02-07 RX ADMIN — Medication 3 UNIT(S): at 12:25

## 2017-02-07 RX ADMIN — Medication 81 MILLIGRAM(S): at 12:25

## 2017-02-07 RX ADMIN — INSULIN GLARGINE 23 UNIT(S): 100 INJECTION, SOLUTION SUBCUTANEOUS at 22:13

## 2017-02-07 RX ADMIN — HEPARIN SODIUM 2300 UNIT(S)/HR: 5000 INJECTION INTRAVENOUS; SUBCUTANEOUS at 01:39

## 2017-02-07 RX ADMIN — Medication 3 UNIT(S): at 08:44

## 2017-02-07 RX ADMIN — Medication 2: at 18:17

## 2017-02-07 RX ADMIN — Medication 50 MILLIGRAM(S): at 18:24

## 2017-02-07 RX ADMIN — Medication 1000 UNIT(S): at 12:25

## 2017-02-07 RX ADMIN — HEPARIN SODIUM 2300 UNIT(S)/HR: 5000 INJECTION INTRAVENOUS; SUBCUTANEOUS at 08:50

## 2017-02-07 RX ADMIN — SIMVASTATIN 40 MILLIGRAM(S): 20 TABLET, FILM COATED ORAL at 22:14

## 2017-02-07 RX ADMIN — Medication 50 MILLIGRAM(S): at 06:00

## 2017-02-07 RX ADMIN — Medication 75 MILLIGRAM(S): at 14:04

## 2017-02-07 RX ADMIN — Medication 75 MILLIGRAM(S): at 22:14

## 2017-02-07 RX ADMIN — Medication 3 UNIT(S): at 18:18

## 2017-02-07 RX ADMIN — Medication 4: at 12:24

## 2017-02-07 RX ADMIN — Medication 75 MILLIGRAM(S): at 06:00

## 2017-02-07 RX ADMIN — Medication 2 MILLIGRAM(S): at 22:14

## 2017-02-07 RX ADMIN — AMLODIPINE BESYLATE 10 MILLIGRAM(S): 2.5 TABLET ORAL at 06:00

## 2017-02-08 LAB
ANION GAP SERPL CALC-SCNC: 15 MMOL/L — SIGNIFICANT CHANGE UP (ref 5–17)
APTT BLD: 52 SEC — HIGH (ref 27.5–37.4)
APTT BLD: 91.9 SEC — HIGH (ref 27.5–37.4)
BUN SERPL-MCNC: 101 MG/DL — HIGH (ref 7–23)
CALCIUM SERPL-MCNC: 8.1 MG/DL — LOW (ref 8.4–10.5)
CHLORIDE SERPL-SCNC: 96 MMOL/L — SIGNIFICANT CHANGE UP (ref 96–108)
CO2 SERPL-SCNC: 25 MMOL/L — SIGNIFICANT CHANGE UP (ref 22–31)
CREAT SERPL-MCNC: 3.16 MG/DL — HIGH (ref 0.5–1.3)
GLUCOSE SERPL-MCNC: 271 MG/DL — HIGH (ref 70–99)
HCT VFR BLD CALC: 30.4 % — LOW (ref 39–50)
HGB BLD-MCNC: 9.3 G/DL — LOW (ref 13–17)
INTERPRETATION SERPL IFE-IMP: SIGNIFICANT CHANGE UP
MCHC RBC-ENTMCNC: 25.7 PG — LOW (ref 27–34)
MCHC RBC-ENTMCNC: 30.6 GM/DL — LOW (ref 32–36)
MCV RBC AUTO: 84 FL — SIGNIFICANT CHANGE UP (ref 80–100)
PLATELET # BLD AUTO: 259 K/UL — SIGNIFICANT CHANGE UP (ref 150–400)
POTASSIUM SERPL-MCNC: 4.4 MMOL/L — SIGNIFICANT CHANGE UP (ref 3.5–5.3)
POTASSIUM SERPL-SCNC: 4.4 MMOL/L — SIGNIFICANT CHANGE UP (ref 3.5–5.3)
RBC # BLD: 3.62 M/UL — LOW (ref 4.2–5.8)
RBC # FLD: 14.3 % — SIGNIFICANT CHANGE UP (ref 10.3–14.5)
SODIUM SERPL-SCNC: 136 MMOL/L — SIGNIFICANT CHANGE UP (ref 135–145)
WBC # BLD: 12.69 K/UL — HIGH (ref 3.8–10.5)
WBC # FLD AUTO: 12.69 K/UL — HIGH (ref 3.8–10.5)

## 2017-02-08 PROCEDURE — 93971 EXTREMITY STUDY: CPT | Mod: 26

## 2017-02-08 RX ORDER — DOCUSATE SODIUM 100 MG
100 CAPSULE ORAL THREE TIMES A DAY
Qty: 0 | Refills: 0 | Status: DISCONTINUED | OUTPATIENT
Start: 2017-02-08 | End: 2017-02-28

## 2017-02-08 RX ORDER — SENNA PLUS 8.6 MG/1
2 TABLET ORAL AT BEDTIME
Qty: 0 | Refills: 0 | Status: DISCONTINUED | OUTPATIENT
Start: 2017-02-08 | End: 2017-02-28

## 2017-02-08 RX ORDER — INSULIN LISPRO 100/ML
4 VIAL (ML) SUBCUTANEOUS
Qty: 0 | Refills: 0 | Status: DISCONTINUED | OUTPATIENT
Start: 2017-02-08 | End: 2017-02-09

## 2017-02-08 RX ORDER — INSULIN GLARGINE 100 [IU]/ML
28 INJECTION, SOLUTION SUBCUTANEOUS AT BEDTIME
Qty: 0 | Refills: 0 | Status: DISCONTINUED | OUTPATIENT
Start: 2017-02-08 | End: 2017-02-09

## 2017-02-08 RX ADMIN — INSULIN GLARGINE 28 UNIT(S): 100 INJECTION, SOLUTION SUBCUTANEOUS at 22:06

## 2017-02-08 RX ADMIN — Medication 75 MILLIGRAM(S): at 05:35

## 2017-02-08 RX ADMIN — Medication 3: at 08:08

## 2017-02-08 RX ADMIN — HEPARIN SODIUM 2600 UNIT(S)/HR: 5000 INJECTION INTRAVENOUS; SUBCUTANEOUS at 18:46

## 2017-02-08 RX ADMIN — Medication 4 UNIT(S): at 13:12

## 2017-02-08 RX ADMIN — SENNA PLUS 2 TABLET(S): 8.6 TABLET ORAL at 22:12

## 2017-02-08 RX ADMIN — Medication 2: at 13:11

## 2017-02-08 RX ADMIN — Medication 2 MILLIGRAM(S): at 22:07

## 2017-02-08 RX ADMIN — Medication 100 MILLIGRAM(S): at 22:12

## 2017-02-08 RX ADMIN — HEPARIN SODIUM 2600 UNIT(S)/HR: 5000 INJECTION INTRAVENOUS; SUBCUTANEOUS at 09:31

## 2017-02-08 RX ADMIN — ISOSORBIDE MONONITRATE 30 MILLIGRAM(S): 60 TABLET, EXTENDED RELEASE ORAL at 13:14

## 2017-02-08 RX ADMIN — Medication 75 MILLIGRAM(S): at 13:15

## 2017-02-08 RX ADMIN — SIMVASTATIN 40 MILLIGRAM(S): 20 TABLET, FILM COATED ORAL at 22:07

## 2017-02-08 RX ADMIN — Medication 75 MILLIGRAM(S): at 22:07

## 2017-02-08 RX ADMIN — Medication 80 MILLIGRAM(S): at 18:35

## 2017-02-08 RX ADMIN — Medication 50 MILLIGRAM(S): at 05:36

## 2017-02-08 RX ADMIN — Medication 1000 UNIT(S): at 13:14

## 2017-02-08 RX ADMIN — HEPARIN SODIUM 5000 UNIT(S): 5000 INJECTION INTRAVENOUS; SUBCUTANEOUS at 10:40

## 2017-02-08 RX ADMIN — Medication 80 MILLIGRAM(S): at 05:35

## 2017-02-08 RX ADMIN — Medication 3 UNIT(S): at 08:08

## 2017-02-08 RX ADMIN — Medication 4 UNIT(S): at 18:49

## 2017-02-08 RX ADMIN — Medication 100 MILLIGRAM(S): at 05:35

## 2017-02-08 RX ADMIN — Medication 2: at 18:35

## 2017-02-08 RX ADMIN — Medication 81 MILLIGRAM(S): at 13:13

## 2017-02-08 RX ADMIN — AMLODIPINE BESYLATE 10 MILLIGRAM(S): 2.5 TABLET ORAL at 05:35

## 2017-02-09 LAB
AMMONIA BLD-MCNC: 24 UMOL/L — SIGNIFICANT CHANGE UP (ref 11–55)
ANION GAP SERPL CALC-SCNC: 17 MMOL/L — SIGNIFICANT CHANGE UP (ref 5–17)
APPEARANCE UR: ABNORMAL
APTT BLD: 82.4 SEC — HIGH (ref 27.5–37.4)
BASE EXCESS BLDA CALC-SCNC: 3.6 MMOL/L — HIGH (ref -2–2)
BILIRUB UR-MCNC: NEGATIVE — SIGNIFICANT CHANGE UP
BUN SERPL-MCNC: 103 MG/DL — HIGH (ref 7–23)
CALCIUM SERPL-MCNC: 8.5 MG/DL — SIGNIFICANT CHANGE UP (ref 8.4–10.5)
CHLORIDE SERPL-SCNC: 95 MMOL/L — LOW (ref 96–108)
CO2 BLDA-SCNC: 31 MMOL/L — HIGH (ref 22–30)
CO2 SERPL-SCNC: 25 MMOL/L — SIGNIFICANT CHANGE UP (ref 22–31)
COLOR SPEC: YELLOW — SIGNIFICANT CHANGE UP
CREAT SERPL-MCNC: 3.13 MG/DL — HIGH (ref 0.5–1.3)
DIFF PNL FLD: ABNORMAL
EOSINOPHIL NFR URNS MANUAL: NEGATIVE — SIGNIFICANT CHANGE UP
FLUAV H1 2009 PAND RNA SPEC QL NAA+PROBE: DETECTED
GLUCOSE SERPL-MCNC: 227 MG/DL — HIGH (ref 70–99)
GLUCOSE UR QL: ABNORMAL
HCO3 BLDA-SCNC: 29 MMOL/L — SIGNIFICANT CHANGE UP (ref 21–29)
HCT VFR BLD CALC: 28.4 % — LOW (ref 39–50)
HCT VFR BLD CALC: 30.2 % — LOW (ref 39–50)
HGB BLD-MCNC: 9.3 G/DL — LOW (ref 13–17)
HGB BLD-MCNC: 9.5 G/DL — LOW (ref 13–17)
INR BLD: 1.25 RATIO — HIGH (ref 0.88–1.16)
KETONES UR-MCNC: NEGATIVE — SIGNIFICANT CHANGE UP
LACTATE BLDV-MCNC: 0.9 MMOL/L — SIGNIFICANT CHANGE UP (ref 0.7–2)
LEUKOCYTE ESTERASE UR-ACNC: NEGATIVE — SIGNIFICANT CHANGE UP
MCHC RBC-ENTMCNC: 25.7 PG — LOW (ref 27–34)
MCHC RBC-ENTMCNC: 27.8 PG — SIGNIFICANT CHANGE UP (ref 27–34)
MCHC RBC-ENTMCNC: 30.8 GM/DL — LOW (ref 32–36)
MCHC RBC-ENTMCNC: 33.6 GM/DL — SIGNIFICANT CHANGE UP (ref 32–36)
MCV RBC AUTO: 82.8 FL — SIGNIFICANT CHANGE UP (ref 80–100)
MCV RBC AUTO: 83.4 FL — SIGNIFICANT CHANGE UP (ref 80–100)
NITRITE UR-MCNC: NEGATIVE — SIGNIFICANT CHANGE UP
PCO2 BLDA: 52 MMHG — HIGH (ref 32–46)
PH BLDA: 7.37 — SIGNIFICANT CHANGE UP (ref 7.35–7.45)
PH UR: 5.5 — SIGNIFICANT CHANGE UP (ref 4.8–8)
PLATELET # BLD AUTO: 211 K/UL — SIGNIFICANT CHANGE UP (ref 150–400)
PLATELET # BLD AUTO: 260 K/UL — SIGNIFICANT CHANGE UP (ref 150–400)
PO2 BLDA: 53 MMHG — LOW (ref 74–108)
POTASSIUM SERPL-MCNC: 4.3 MMOL/L — SIGNIFICANT CHANGE UP (ref 3.5–5.3)
POTASSIUM SERPL-SCNC: 4.3 MMOL/L — SIGNIFICANT CHANGE UP (ref 3.5–5.3)
PROT UR-MCNC: 100 MG/DL
PROTHROM AB SERPL-ACNC: 14.2 SEC — HIGH (ref 10–13.1)
RAPID RVP RESULT: DETECTED
RBC # BLD: 3.43 M/UL — LOW (ref 4.2–5.8)
RBC # BLD: 3.62 M/UL — LOW (ref 4.2–5.8)
RBC # FLD: 13.6 % — SIGNIFICANT CHANGE UP (ref 10.3–14.5)
RBC # FLD: 14.3 % — SIGNIFICANT CHANGE UP (ref 10.3–14.5)
SAO2 % BLDA: 85 % — LOW (ref 92–96)
SODIUM SERPL-SCNC: 137 MMOL/L — SIGNIFICANT CHANGE UP (ref 135–145)
SP GR SPEC: 1.01 — SIGNIFICANT CHANGE UP (ref 1.01–1.02)
UROBILINOGEN FLD QL: NEGATIVE — SIGNIFICANT CHANGE UP
WBC # BLD: 12.61 K/UL — HIGH (ref 3.8–10.5)
WBC # BLD: 13.6 K/UL — HIGH (ref 3.8–10.5)
WBC # FLD AUTO: 12.61 K/UL — HIGH (ref 3.8–10.5)
WBC # FLD AUTO: 13.6 K/UL — HIGH (ref 3.8–10.5)

## 2017-02-09 PROCEDURE — 71010: CPT | Mod: 26

## 2017-02-09 RX ORDER — INSULIN GLARGINE 100 [IU]/ML
30 INJECTION, SOLUTION SUBCUTANEOUS AT BEDTIME
Qty: 0 | Refills: 0 | Status: DISCONTINUED | OUTPATIENT
Start: 2017-02-09 | End: 2017-02-23

## 2017-02-09 RX ORDER — VANCOMYCIN HCL 1 G
1000 VIAL (EA) INTRAVENOUS EVERY 24 HOURS
Qty: 0 | Refills: 0 | Status: DISCONTINUED | OUTPATIENT
Start: 2017-02-09 | End: 2017-02-12

## 2017-02-09 RX ORDER — INSULIN LISPRO 100/ML
6 VIAL (ML) SUBCUTANEOUS
Qty: 0 | Refills: 0 | Status: DISCONTINUED | OUTPATIENT
Start: 2017-02-09 | End: 2017-02-23

## 2017-02-09 RX ORDER — PIPERACILLIN AND TAZOBACTAM 4; .5 G/20ML; G/20ML
3.38 INJECTION, POWDER, LYOPHILIZED, FOR SOLUTION INTRAVENOUS ONCE
Qty: 0 | Refills: 0 | Status: COMPLETED | OUTPATIENT
Start: 2017-02-09 | End: 2017-02-09

## 2017-02-09 RX ORDER — ACETAMINOPHEN 500 MG
650 TABLET ORAL ONCE
Qty: 0 | Refills: 0 | Status: COMPLETED | OUTPATIENT
Start: 2017-02-09 | End: 2017-02-09

## 2017-02-09 RX ORDER — PIPERACILLIN AND TAZOBACTAM 4; .5 G/20ML; G/20ML
3.38 INJECTION, POWDER, LYOPHILIZED, FOR SOLUTION INTRAVENOUS EVERY 12 HOURS
Qty: 0 | Refills: 0 | Status: DISCONTINUED | OUTPATIENT
Start: 2017-02-09 | End: 2017-02-12

## 2017-02-09 RX ADMIN — Medication 650 MILLIGRAM(S): at 18:21

## 2017-02-09 RX ADMIN — PIPERACILLIN AND TAZOBACTAM 200 GRAM(S): 4; .5 INJECTION, POWDER, LYOPHILIZED, FOR SOLUTION INTRAVENOUS at 20:31

## 2017-02-09 RX ADMIN — Medication 80 MILLIGRAM(S): at 05:38

## 2017-02-09 RX ADMIN — Medication 100 MILLIGRAM(S): at 05:38

## 2017-02-09 RX ADMIN — Medication 81 MILLIGRAM(S): at 13:23

## 2017-02-09 RX ADMIN — AMLODIPINE BESYLATE 10 MILLIGRAM(S): 2.5 TABLET ORAL at 05:38

## 2017-02-09 RX ADMIN — Medication 4 UNIT(S): at 13:22

## 2017-02-09 RX ADMIN — Medication 2: at 22:27

## 2017-02-09 RX ADMIN — HEPARIN SODIUM 2600 UNIT(S)/HR: 5000 INJECTION INTRAVENOUS; SUBCUTANEOUS at 01:07

## 2017-02-09 RX ADMIN — Medication 250 MILLIGRAM(S): at 23:12

## 2017-02-09 RX ADMIN — Medication 75 MILLIGRAM(S): at 13:24

## 2017-02-09 RX ADMIN — Medication 80 MILLIGRAM(S): at 17:26

## 2017-02-09 RX ADMIN — Medication 2 MILLIGRAM(S): at 22:24

## 2017-02-09 RX ADMIN — Medication 75 MILLIGRAM(S): at 05:38

## 2017-02-09 RX ADMIN — Medication 100 MILLIGRAM(S): at 22:24

## 2017-02-09 RX ADMIN — Medication 650 MILLIGRAM(S): at 17:25

## 2017-02-09 RX ADMIN — Medication 50 MILLIGRAM(S): at 13:26

## 2017-02-09 RX ADMIN — INSULIN GLARGINE 30 UNIT(S): 100 INJECTION, SOLUTION SUBCUTANEOUS at 22:25

## 2017-02-09 RX ADMIN — Medication 4 UNIT(S): at 09:15

## 2017-02-09 RX ADMIN — SIMVASTATIN 40 MILLIGRAM(S): 20 TABLET, FILM COATED ORAL at 22:24

## 2017-02-09 RX ADMIN — Medication 6 UNIT(S): at 17:27

## 2017-02-09 RX ADMIN — Medication 1000 UNIT(S): at 13:24

## 2017-02-09 RX ADMIN — Medication 2: at 09:15

## 2017-02-09 RX ADMIN — Medication 75 MILLIGRAM(S): at 22:25

## 2017-02-09 RX ADMIN — ISOSORBIDE MONONITRATE 30 MILLIGRAM(S): 60 TABLET, EXTENDED RELEASE ORAL at 13:23

## 2017-02-09 RX ADMIN — SENNA PLUS 2 TABLET(S): 8.6 TABLET ORAL at 22:26

## 2017-02-09 RX ADMIN — Medication 100 MILLIGRAM(S): at 13:23

## 2017-02-09 RX ADMIN — Medication 30 MILLIGRAM(S): at 22:24

## 2017-02-09 RX ADMIN — Medication 2: at 13:21

## 2017-02-09 RX ADMIN — Medication 2: at 17:26

## 2017-02-10 LAB
ANION GAP SERPL CALC-SCNC: 15 MMOL/L — SIGNIFICANT CHANGE UP (ref 5–17)
APTT BLD: 41.5 SEC — HIGH (ref 27.5–37.4)
APTT BLD: 66 SEC — HIGH (ref 27.5–37.4)
APTT BLD: 92.7 SEC — HIGH (ref 27.5–37.4)
BUN SERPL-MCNC: 105 MG/DL — HIGH (ref 7–23)
CALCIUM SERPL-MCNC: 8.7 MG/DL — SIGNIFICANT CHANGE UP (ref 8.4–10.5)
CHLORIDE SERPL-SCNC: 94 MMOL/L — LOW (ref 96–108)
CO2 SERPL-SCNC: 28 MMOL/L — SIGNIFICANT CHANGE UP (ref 22–31)
CREAT SERPL-MCNC: 3.01 MG/DL — HIGH (ref 0.5–1.3)
GLUCOSE SERPL-MCNC: 215 MG/DL — HIGH (ref 70–99)
HCT VFR BLD CALC: 29.3 % — LOW (ref 39–50)
HGB BLD-MCNC: 8.8 G/DL — LOW (ref 13–17)
MCHC RBC-ENTMCNC: 25.2 PG — LOW (ref 27–34)
MCHC RBC-ENTMCNC: 30 GM/DL — LOW (ref 32–36)
MCV RBC AUTO: 84 FL — SIGNIFICANT CHANGE UP (ref 80–100)
PLATELET # BLD AUTO: 233 K/UL — SIGNIFICANT CHANGE UP (ref 150–400)
POTASSIUM SERPL-MCNC: 4.2 MMOL/L — SIGNIFICANT CHANGE UP (ref 3.5–5.3)
POTASSIUM SERPL-SCNC: 4.2 MMOL/L — SIGNIFICANT CHANGE UP (ref 3.5–5.3)
RBC # BLD: 3.49 M/UL — LOW (ref 4.2–5.8)
RBC # FLD: 14.5 % — SIGNIFICANT CHANGE UP (ref 10.3–14.5)
SODIUM SERPL-SCNC: 137 MMOL/L — SIGNIFICANT CHANGE UP (ref 135–145)
WBC # BLD: 9.92 K/UL — SIGNIFICANT CHANGE UP (ref 3.8–10.5)
WBC # FLD AUTO: 9.92 K/UL — SIGNIFICANT CHANGE UP (ref 3.8–10.5)

## 2017-02-10 RX ORDER — ACETAMINOPHEN 500 MG
650 TABLET ORAL EVERY 6 HOURS
Qty: 0 | Refills: 0 | Status: DISCONTINUED | OUTPATIENT
Start: 2017-02-10 | End: 2017-02-28

## 2017-02-10 RX ORDER — DABIGATRAN ETEXILATE MESYLATE 150 MG/1
75 CAPSULE ORAL EVERY 12 HOURS
Qty: 0 | Refills: 0 | Status: DISCONTINUED | OUTPATIENT
Start: 2017-02-10 | End: 2017-02-17

## 2017-02-10 RX ADMIN — ISOSORBIDE MONONITRATE 30 MILLIGRAM(S): 60 TABLET, EXTENDED RELEASE ORAL at 14:05

## 2017-02-10 RX ADMIN — Medication 100 MILLIGRAM(S): at 05:23

## 2017-02-10 RX ADMIN — Medication 80 MILLIGRAM(S): at 18:08

## 2017-02-10 RX ADMIN — PIPERACILLIN AND TAZOBACTAM 25 GRAM(S): 4; .5 INJECTION, POWDER, LYOPHILIZED, FOR SOLUTION INTRAVENOUS at 18:12

## 2017-02-10 RX ADMIN — Medication 80 MILLIGRAM(S): at 05:23

## 2017-02-10 RX ADMIN — Medication 650 MILLIGRAM(S): at 05:22

## 2017-02-10 RX ADMIN — DABIGATRAN ETEXILATE MESYLATE 75 MILLIGRAM(S): 150 CAPSULE ORAL at 22:13

## 2017-02-10 RX ADMIN — Medication 81 MILLIGRAM(S): at 14:05

## 2017-02-10 RX ADMIN — Medication 75 MILLIGRAM(S): at 14:06

## 2017-02-10 RX ADMIN — Medication 6 UNIT(S): at 14:13

## 2017-02-10 RX ADMIN — HEPARIN SODIUM 5000 UNIT(S): 5000 INJECTION INTRAVENOUS; SUBCUTANEOUS at 09:30

## 2017-02-10 RX ADMIN — Medication 1000 UNIT(S): at 14:05

## 2017-02-10 RX ADMIN — Medication 50 MILLIGRAM(S): at 14:13

## 2017-02-10 RX ADMIN — INSULIN GLARGINE 30 UNIT(S): 100 INJECTION, SOLUTION SUBCUTANEOUS at 22:14

## 2017-02-10 RX ADMIN — Medication 6 UNIT(S): at 09:27

## 2017-02-10 RX ADMIN — PIPERACILLIN AND TAZOBACTAM 25 GRAM(S): 4; .5 INJECTION, POWDER, LYOPHILIZED, FOR SOLUTION INTRAVENOUS at 05:22

## 2017-02-10 RX ADMIN — Medication 250 MILLIGRAM(S): at 22:14

## 2017-02-10 RX ADMIN — Medication 30 MILLIGRAM(S): at 05:22

## 2017-02-10 RX ADMIN — AMLODIPINE BESYLATE 10 MILLIGRAM(S): 2.5 TABLET ORAL at 05:23

## 2017-02-10 RX ADMIN — Medication 30 MILLIGRAM(S): at 22:13

## 2017-02-10 RX ADMIN — Medication 2 MILLIGRAM(S): at 22:13

## 2017-02-10 RX ADMIN — HEPARIN SODIUM 2900 UNIT(S)/HR: 5000 INJECTION INTRAVENOUS; SUBCUTANEOUS at 09:29

## 2017-02-10 RX ADMIN — Medication 2: at 09:26

## 2017-02-10 RX ADMIN — Medication 100 MILLIGRAM(S): at 22:13

## 2017-02-10 RX ADMIN — Medication 75 MILLIGRAM(S): at 22:13

## 2017-02-10 RX ADMIN — SENNA PLUS 2 TABLET(S): 8.6 TABLET ORAL at 22:13

## 2017-02-10 RX ADMIN — Medication 75 MILLIGRAM(S): at 05:23

## 2017-02-10 RX ADMIN — Medication 100 MILLIGRAM(S): at 14:06

## 2017-02-10 RX ADMIN — SIMVASTATIN 40 MILLIGRAM(S): 20 TABLET, FILM COATED ORAL at 22:13

## 2017-02-10 RX ADMIN — HEPARIN SODIUM 2900 UNIT(S)/HR: 5000 INJECTION INTRAVENOUS; SUBCUTANEOUS at 18:06

## 2017-02-11 LAB
ANION GAP SERPL CALC-SCNC: 15 MMOL/L — SIGNIFICANT CHANGE UP (ref 5–17)
BUN SERPL-MCNC: 114 MG/DL — HIGH (ref 7–23)
CALCIUM SERPL-MCNC: 9 MG/DL — SIGNIFICANT CHANGE UP (ref 8.4–10.5)
CHLORIDE SERPL-SCNC: 94 MMOL/L — LOW (ref 96–108)
CO2 SERPL-SCNC: 26 MMOL/L — SIGNIFICANT CHANGE UP (ref 22–31)
CREAT SERPL-MCNC: 3.17 MG/DL — HIGH (ref 0.5–1.3)
GLUCOSE SERPL-MCNC: 109 MG/DL — HIGH (ref 70–99)
HCT VFR BLD CALC: 30.1 % — LOW (ref 39–50)
HGB BLD-MCNC: 8.9 G/DL — LOW (ref 13–17)
MCHC RBC-ENTMCNC: 25 PG — LOW (ref 27–34)
MCHC RBC-ENTMCNC: 29.6 GM/DL — LOW (ref 32–36)
MCV RBC AUTO: 84.6 FL — SIGNIFICANT CHANGE UP (ref 80–100)
PLATELET # BLD AUTO: 259 K/UL — SIGNIFICANT CHANGE UP (ref 150–400)
POTASSIUM SERPL-MCNC: 4 MMOL/L — SIGNIFICANT CHANGE UP (ref 3.5–5.3)
POTASSIUM SERPL-SCNC: 4 MMOL/L — SIGNIFICANT CHANGE UP (ref 3.5–5.3)
RBC # BLD: 3.56 M/UL — LOW (ref 4.2–5.8)
RBC # FLD: 14.3 % — SIGNIFICANT CHANGE UP (ref 10.3–14.5)
SODIUM SERPL-SCNC: 135 MMOL/L — SIGNIFICANT CHANGE UP (ref 135–145)
VANCOMYCIN TROUGH SERPL-MCNC: 8.3 UG/ML — LOW (ref 10–20)
WBC # BLD: 9.74 K/UL — SIGNIFICANT CHANGE UP (ref 3.8–10.5)
WBC # FLD AUTO: 9.74 K/UL — SIGNIFICANT CHANGE UP (ref 3.8–10.5)

## 2017-02-11 RX ORDER — FUROSEMIDE 40 MG
80 TABLET ORAL DAILY
Qty: 0 | Refills: 0 | Status: DISCONTINUED | OUTPATIENT
Start: 2017-02-11 | End: 2017-02-14

## 2017-02-11 RX ADMIN — Medication 6 UNIT(S): at 17:09

## 2017-02-11 RX ADMIN — Medication 1: at 17:09

## 2017-02-11 RX ADMIN — Medication 75 MILLIGRAM(S): at 05:16

## 2017-02-11 RX ADMIN — Medication 75 MILLIGRAM(S): at 22:05

## 2017-02-11 RX ADMIN — Medication 100 MILLIGRAM(S): at 22:06

## 2017-02-11 RX ADMIN — DABIGATRAN ETEXILATE MESYLATE 75 MILLIGRAM(S): 150 CAPSULE ORAL at 05:16

## 2017-02-11 RX ADMIN — PIPERACILLIN AND TAZOBACTAM 25 GRAM(S): 4; .5 INJECTION, POWDER, LYOPHILIZED, FOR SOLUTION INTRAVENOUS at 17:09

## 2017-02-11 RX ADMIN — Medication 75 MILLIGRAM(S): at 15:56

## 2017-02-11 RX ADMIN — INSULIN GLARGINE 30 UNIT(S): 100 INJECTION, SOLUTION SUBCUTANEOUS at 22:05

## 2017-02-11 RX ADMIN — Medication 30 MILLIGRAM(S): at 05:16

## 2017-02-11 RX ADMIN — Medication 1: at 12:30

## 2017-02-11 RX ADMIN — Medication 100 MILLIGRAM(S): at 05:16

## 2017-02-11 RX ADMIN — Medication 6 UNIT(S): at 08:00

## 2017-02-11 RX ADMIN — Medication 2 MILLIGRAM(S): at 22:06

## 2017-02-11 RX ADMIN — Medication 30 MILLIGRAM(S): at 17:09

## 2017-02-11 RX ADMIN — Medication 100 MILLIGRAM(S): at 15:56

## 2017-02-11 RX ADMIN — SIMVASTATIN 40 MILLIGRAM(S): 20 TABLET, FILM COATED ORAL at 22:05

## 2017-02-11 RX ADMIN — ISOSORBIDE MONONITRATE 30 MILLIGRAM(S): 60 TABLET, EXTENDED RELEASE ORAL at 12:32

## 2017-02-11 RX ADMIN — SENNA PLUS 2 TABLET(S): 8.6 TABLET ORAL at 22:05

## 2017-02-11 RX ADMIN — AMLODIPINE BESYLATE 10 MILLIGRAM(S): 2.5 TABLET ORAL at 05:16

## 2017-02-11 RX ADMIN — Medication 250 MILLIGRAM(S): at 22:48

## 2017-02-11 RX ADMIN — Medication 81 MILLIGRAM(S): at 12:30

## 2017-02-11 RX ADMIN — PIPERACILLIN AND TAZOBACTAM 25 GRAM(S): 4; .5 INJECTION, POWDER, LYOPHILIZED, FOR SOLUTION INTRAVENOUS at 05:16

## 2017-02-11 RX ADMIN — DABIGATRAN ETEXILATE MESYLATE 75 MILLIGRAM(S): 150 CAPSULE ORAL at 17:09

## 2017-02-11 RX ADMIN — Medication 80 MILLIGRAM(S): at 05:16

## 2017-02-11 RX ADMIN — Medication 1000 UNIT(S): at 12:30

## 2017-02-12 LAB
-  AMPICILLIN: SIGNIFICANT CHANGE UP
-  CIPROFLOXACIN: SIGNIFICANT CHANGE UP
-  NITROFURANTOIN: SIGNIFICANT CHANGE UP
-  TETRACYCLINE: SIGNIFICANT CHANGE UP
-  VANCOMYCIN: SIGNIFICANT CHANGE UP
ANION GAP SERPL CALC-SCNC: 15 MMOL/L — SIGNIFICANT CHANGE UP (ref 5–17)
BUN SERPL-MCNC: 119 MG/DL — HIGH (ref 7–23)
CALCIUM SERPL-MCNC: 8.9 MG/DL — SIGNIFICANT CHANGE UP (ref 8.4–10.5)
CHLORIDE SERPL-SCNC: 93 MMOL/L — LOW (ref 96–108)
CO2 SERPL-SCNC: 29 MMOL/L — SIGNIFICANT CHANGE UP (ref 22–31)
CREAT SERPL-MCNC: 3.12 MG/DL — HIGH (ref 0.5–1.3)
CULTURE RESULTS: SIGNIFICANT CHANGE UP
GLUCOSE SERPL-MCNC: 180 MG/DL — HIGH (ref 70–99)
HCT VFR BLD CALC: 29.4 % — LOW (ref 39–50)
HGB BLD-MCNC: 9.1 G/DL — LOW (ref 13–17)
MCHC RBC-ENTMCNC: 25.5 PG — LOW (ref 27–34)
MCHC RBC-ENTMCNC: 31 GM/DL — LOW (ref 32–36)
MCV RBC AUTO: 82.4 FL — SIGNIFICANT CHANGE UP (ref 80–100)
METHOD TYPE: SIGNIFICANT CHANGE UP
ORGANISM # SPEC MICROSCOPIC CNT: SIGNIFICANT CHANGE UP
ORGANISM # SPEC MICROSCOPIC CNT: SIGNIFICANT CHANGE UP
PLATELET # BLD AUTO: 245 K/UL — SIGNIFICANT CHANGE UP (ref 150–400)
POTASSIUM SERPL-MCNC: 3.8 MMOL/L — SIGNIFICANT CHANGE UP (ref 3.5–5.3)
POTASSIUM SERPL-SCNC: 3.8 MMOL/L — SIGNIFICANT CHANGE UP (ref 3.5–5.3)
RBC # BLD: 3.57 M/UL — LOW (ref 4.2–5.8)
RBC # FLD: 14.5 % — SIGNIFICANT CHANGE UP (ref 10.3–14.5)
SODIUM SERPL-SCNC: 137 MMOL/L — SIGNIFICANT CHANGE UP (ref 135–145)
SPECIMEN SOURCE: SIGNIFICANT CHANGE UP
WBC # BLD: 12.68 K/UL — HIGH (ref 3.8–10.5)
WBC # FLD AUTO: 12.68 K/UL — HIGH (ref 3.8–10.5)

## 2017-02-12 RX ADMIN — Medication 100 MILLIGRAM(S): at 05:09

## 2017-02-12 RX ADMIN — Medication 2 MILLIGRAM(S): at 21:29

## 2017-02-12 RX ADMIN — PIPERACILLIN AND TAZOBACTAM 25 GRAM(S): 4; .5 INJECTION, POWDER, LYOPHILIZED, FOR SOLUTION INTRAVENOUS at 05:10

## 2017-02-12 RX ADMIN — Medication 1000 UNIT(S): at 12:52

## 2017-02-12 RX ADMIN — Medication 30 MILLIGRAM(S): at 05:09

## 2017-02-12 RX ADMIN — Medication 80 MILLIGRAM(S): at 05:09

## 2017-02-12 RX ADMIN — Medication 100 MILLIGRAM(S): at 15:46

## 2017-02-12 RX ADMIN — Medication 75 MILLIGRAM(S): at 21:29

## 2017-02-12 RX ADMIN — INSULIN GLARGINE 30 UNIT(S): 100 INJECTION, SOLUTION SUBCUTANEOUS at 21:29

## 2017-02-12 RX ADMIN — Medication 6 UNIT(S): at 10:01

## 2017-02-12 RX ADMIN — Medication 30 MILLIGRAM(S): at 17:11

## 2017-02-12 RX ADMIN — SIMVASTATIN 40 MILLIGRAM(S): 20 TABLET, FILM COATED ORAL at 21:29

## 2017-02-12 RX ADMIN — Medication 75 MILLIGRAM(S): at 15:46

## 2017-02-12 RX ADMIN — Medication 1: at 10:01

## 2017-02-12 RX ADMIN — ISOSORBIDE MONONITRATE 30 MILLIGRAM(S): 60 TABLET, EXTENDED RELEASE ORAL at 12:52

## 2017-02-12 RX ADMIN — Medication 2: at 13:46

## 2017-02-12 RX ADMIN — SENNA PLUS 2 TABLET(S): 8.6 TABLET ORAL at 21:29

## 2017-02-12 RX ADMIN — Medication 6 UNIT(S): at 17:10

## 2017-02-12 RX ADMIN — DABIGATRAN ETEXILATE MESYLATE 75 MILLIGRAM(S): 150 CAPSULE ORAL at 05:09

## 2017-02-12 RX ADMIN — DABIGATRAN ETEXILATE MESYLATE 75 MILLIGRAM(S): 150 CAPSULE ORAL at 17:10

## 2017-02-12 RX ADMIN — Medication 75 MILLIGRAM(S): at 05:09

## 2017-02-12 RX ADMIN — Medication 81 MILLIGRAM(S): at 12:52

## 2017-02-12 RX ADMIN — Medication 2: at 17:10

## 2017-02-12 RX ADMIN — AMLODIPINE BESYLATE 10 MILLIGRAM(S): 2.5 TABLET ORAL at 05:09

## 2017-02-12 RX ADMIN — Medication 100 MILLIGRAM(S): at 21:29

## 2017-02-13 LAB
ANION GAP SERPL CALC-SCNC: 17 MMOL/L — SIGNIFICANT CHANGE UP (ref 5–17)
BUN SERPL-MCNC: 112 MG/DL — HIGH (ref 7–23)
CALCIUM SERPL-MCNC: 8.6 MG/DL — SIGNIFICANT CHANGE UP (ref 8.4–10.5)
CHLORIDE SERPL-SCNC: 92 MMOL/L — LOW (ref 96–108)
CO2 SERPL-SCNC: 27 MMOL/L — SIGNIFICANT CHANGE UP (ref 22–31)
CREAT SERPL-MCNC: 3.2 MG/DL — HIGH (ref 0.5–1.3)
GLUCOSE SERPL-MCNC: 170 MG/DL — HIGH (ref 70–99)
HCT VFR BLD CALC: 30.5 % — LOW (ref 39–50)
HGB BLD-MCNC: 9.4 G/DL — LOW (ref 13–17)
MCHC RBC-ENTMCNC: 25.2 PG — LOW (ref 27–34)
MCHC RBC-ENTMCNC: 30.8 GM/DL — LOW (ref 32–36)
MCV RBC AUTO: 81.8 FL — SIGNIFICANT CHANGE UP (ref 80–100)
PLATELET # BLD AUTO: 266 K/UL — SIGNIFICANT CHANGE UP (ref 150–400)
POTASSIUM SERPL-MCNC: 3.5 MMOL/L — SIGNIFICANT CHANGE UP (ref 3.5–5.3)
POTASSIUM SERPL-SCNC: 3.5 MMOL/L — SIGNIFICANT CHANGE UP (ref 3.5–5.3)
RBC # BLD: 3.73 M/UL — LOW (ref 4.2–5.8)
RBC # FLD: 14.5 % — SIGNIFICANT CHANGE UP (ref 10.3–14.5)
SODIUM SERPL-SCNC: 136 MMOL/L — SIGNIFICANT CHANGE UP (ref 135–145)
WBC # BLD: 15.12 K/UL — HIGH (ref 3.8–10.5)
WBC # FLD AUTO: 15.12 K/UL — HIGH (ref 3.8–10.5)

## 2017-02-13 RX ORDER — PIPERACILLIN AND TAZOBACTAM 4; .5 G/20ML; G/20ML
3.38 INJECTION, POWDER, LYOPHILIZED, FOR SOLUTION INTRAVENOUS EVERY 12 HOURS
Qty: 0 | Refills: 0 | Status: DISCONTINUED | OUTPATIENT
Start: 2017-02-13 | End: 2017-02-17

## 2017-02-13 RX ORDER — PIPERACILLIN AND TAZOBACTAM 4; .5 G/20ML; G/20ML
3.38 INJECTION, POWDER, LYOPHILIZED, FOR SOLUTION INTRAVENOUS ONCE
Qty: 0 | Refills: 0 | Status: COMPLETED | OUTPATIENT
Start: 2017-02-13 | End: 2017-02-13

## 2017-02-13 RX ADMIN — Medication 6 UNIT(S): at 09:55

## 2017-02-13 RX ADMIN — Medication 81 MILLIGRAM(S): at 13:32

## 2017-02-13 RX ADMIN — Medication 1: at 09:54

## 2017-02-13 RX ADMIN — Medication 75 MILLIGRAM(S): at 04:56

## 2017-02-13 RX ADMIN — Medication 50 MILLIGRAM(S): at 13:32

## 2017-02-13 RX ADMIN — Medication 30 MILLIGRAM(S): at 17:11

## 2017-02-13 RX ADMIN — Medication 1: at 17:09

## 2017-02-13 RX ADMIN — Medication 2 MILLIGRAM(S): at 21:26

## 2017-02-13 RX ADMIN — Medication 100 MILLIGRAM(S): at 04:56

## 2017-02-13 RX ADMIN — INSULIN GLARGINE 30 UNIT(S): 100 INJECTION, SOLUTION SUBCUTANEOUS at 21:47

## 2017-02-13 RX ADMIN — Medication 75 MILLIGRAM(S): at 13:44

## 2017-02-13 RX ADMIN — Medication 100 MILLIGRAM(S): at 04:55

## 2017-02-13 RX ADMIN — Medication 1000 UNIT(S): at 13:32

## 2017-02-13 RX ADMIN — ISOSORBIDE MONONITRATE 30 MILLIGRAM(S): 60 TABLET, EXTENDED RELEASE ORAL at 13:32

## 2017-02-13 RX ADMIN — Medication 6 UNIT(S): at 13:43

## 2017-02-13 RX ADMIN — PIPERACILLIN AND TAZOBACTAM 200 GRAM(S): 4; .5 INJECTION, POWDER, LYOPHILIZED, FOR SOLUTION INTRAVENOUS at 16:06

## 2017-02-13 RX ADMIN — Medication 80 MILLIGRAM(S): at 04:57

## 2017-02-13 RX ADMIN — SENNA PLUS 2 TABLET(S): 8.6 TABLET ORAL at 21:26

## 2017-02-13 RX ADMIN — DABIGATRAN ETEXILATE MESYLATE 75 MILLIGRAM(S): 150 CAPSULE ORAL at 17:11

## 2017-02-13 RX ADMIN — SIMVASTATIN 40 MILLIGRAM(S): 20 TABLET, FILM COATED ORAL at 21:26

## 2017-02-13 RX ADMIN — Medication 6 UNIT(S): at 17:11

## 2017-02-13 RX ADMIN — Medication 30 MILLIGRAM(S): at 04:57

## 2017-02-13 RX ADMIN — Medication 2: at 13:33

## 2017-02-13 RX ADMIN — Medication 100 MILLIGRAM(S): at 21:26

## 2017-02-13 RX ADMIN — Medication 75 MILLIGRAM(S): at 21:26

## 2017-02-13 RX ADMIN — AMLODIPINE BESYLATE 10 MILLIGRAM(S): 2.5 TABLET ORAL at 04:56

## 2017-02-13 RX ADMIN — DABIGATRAN ETEXILATE MESYLATE 75 MILLIGRAM(S): 150 CAPSULE ORAL at 04:56

## 2017-02-13 RX ADMIN — Medication 100 MILLIGRAM(S): at 13:44

## 2017-02-13 NOTE — PROVIDER CONTACT NOTE (OTHER) - ACTION/TREATMENT ORDERED:
HOLD HEPARIN FOR NOW AND PTT STAT , CALL NP WITH PTT RESULT, WILL CONTINUE TO MONITOR
PT VERBALIZED THE IMPORTANCE OF USING CPAP, PT STILL REFUSING, WILL CONTINUE TO MONITOR
Tylenol 650 mg prn ordered, will continue to monitor
ABG ordered and Hydralazine 75mg given early
Administered hydralazine as ordered.
JOYCE Ruiz notified. Will give am BP meds early and reassess patient.

## 2017-02-13 NOTE — PROVIDER CONTACT NOTE (OTHER) - DATE AND TIME:
09-Feb-2017 01:30
10-Feb-2017 04:20
10-Feb-2017 06:30
03-Feb-2017 22:05
05-Feb-2017 13:00
13-Feb-2017 04:30

## 2017-02-13 NOTE — PROVIDER CONTACT NOTE (OTHER) - SITUATION
PT C/O NOSE BLEED AND COUGHING UP BLOOD CLOT
PT REFUSE BIPAP AT NIGHT
temp 100.5
Patient blood pressure noted to be consistently elevated in the 180 ,denies chest discomfort when asked.Hydralazine was added and iv lasix increased to bid.
Pt /79 and pt lethargic
Pt hypertensive

## 2017-02-13 NOTE — PROVIDER CONTACT NOTE (OTHER) - ASSESSMENT
PT C/O NOSE BLEED AND COUGHING UP BLOOD CLOT
PT REFUSE CPAP AT NIGHT
temp 100.5
97.8-76-/66-95% sup oxygen.
Pt A+Ox3, forgetful at times. No complaints of pain or discomfort.
Pt a&ox3, vss. No c/o of chest pain or palpitations. No c/o of discomfort at this time

## 2017-02-14 LAB
ANION GAP SERPL CALC-SCNC: 18 MMOL/L — HIGH (ref 5–17)
BLD GP AB SCN SERPL QL: NEGATIVE — SIGNIFICANT CHANGE UP
BUN SERPL-MCNC: 116 MG/DL — HIGH (ref 7–23)
CALCIUM SERPL-MCNC: 8.6 MG/DL — SIGNIFICANT CHANGE UP (ref 8.4–10.5)
CHLORIDE SERPL-SCNC: 91 MMOL/L — LOW (ref 96–108)
CO2 SERPL-SCNC: 28 MMOL/L — SIGNIFICANT CHANGE UP (ref 22–31)
COLLECT DURATION TIME UR: 24 HR — SIGNIFICANT CHANGE UP
CREAT SERPL-MCNC: 3.5 MG/DL — HIGH (ref 0.5–1.3)
CULTURE RESULTS: SIGNIFICANT CHANGE UP
CULTURE RESULTS: SIGNIFICANT CHANGE UP
DAT POLY-SP REAG RBC QL: NEGATIVE — SIGNIFICANT CHANGE UP
FERRITIN SERPL-MCNC: 712.4 NG/ML — HIGH (ref 30–400)
FOLATE SERPL-MCNC: 15.6 NG/ML — SIGNIFICANT CHANGE UP (ref 4.8–24.2)
GLUCOSE SERPL-MCNC: 159 MG/DL — HIGH (ref 70–99)
HAPTOGLOB SERPL-MCNC: 456 MG/DL — HIGH (ref 34–200)
HCT VFR BLD CALC: 28.8 % — LOW (ref 39–50)
HGB BLD-MCNC: 8.9 G/DL — LOW (ref 13–17)
IRON SATN MFR SERPL: 14 % — LOW (ref 16–55)
IRON SATN MFR SERPL: 15 % — LOW (ref 16–55)
IRON SATN MFR SERPL: 26 UG/DL — LOW (ref 45–165)
IRON SATN MFR SERPL: 27 UG/DL — LOW (ref 45–165)
LDH SERPL L TO P-CCNC: 248 U/L — HIGH (ref 50–242)
MCHC RBC-ENTMCNC: 25.4 PG — LOW (ref 27–34)
MCHC RBC-ENTMCNC: 30.9 GM/DL — LOW (ref 32–36)
MCV RBC AUTO: 82.1 FL — SIGNIFICANT CHANGE UP (ref 80–100)
PLATELET # BLD AUTO: 291 K/UL — SIGNIFICANT CHANGE UP (ref 150–400)
POTASSIUM SERPL-MCNC: 3.4 MMOL/L — LOW (ref 3.5–5.3)
POTASSIUM SERPL-SCNC: 3.4 MMOL/L — LOW (ref 3.5–5.3)
RBC # BLD: 3.51 M/UL — LOW (ref 4.2–5.8)
RBC # BLD: 3.51 M/UL — LOW (ref 4.2–5.8)
RBC # FLD: 14.7 % — HIGH (ref 10.3–14.5)
RETICS #: 61.1 K/UL — SIGNIFICANT CHANGE UP (ref 25–125)
RETICS/RBC NFR: 1.7 % — SIGNIFICANT CHANGE UP (ref 0.5–2.5)
RH IG SCN BLD-IMP: POSITIVE — SIGNIFICANT CHANGE UP
SODIUM SERPL-SCNC: 137 MMOL/L — SIGNIFICANT CHANGE UP (ref 135–145)
SPECIMEN SOURCE: SIGNIFICANT CHANGE UP
SPECIMEN SOURCE: SIGNIFICANT CHANGE UP
TIBC SERPL-MCNC: 179 UG/DL — LOW (ref 220–430)
TIBC SERPL-MCNC: 181 UG/DL — LOW (ref 220–430)
TOTAL VOLUME - 24 HOUR: 1625 ML — SIGNIFICANT CHANGE UP
UIBC SERPL-MCNC: 152 UG/DL — SIGNIFICANT CHANGE UP (ref 110–370)
UIBC SERPL-MCNC: 155 UG/DL — SIGNIFICANT CHANGE UP (ref 110–370)
URINE CREATININE CALCULATION: 1.2 G/24 H — SIGNIFICANT CHANGE UP (ref 1–2)
VIT B12 SERPL-MCNC: 441 PG/ML — SIGNIFICANT CHANGE UP (ref 243–894)
WBC # BLD: 16.92 K/UL — HIGH (ref 3.8–10.5)
WBC # FLD AUTO: 16.92 K/UL — HIGH (ref 3.8–10.5)

## 2017-02-14 RX ORDER — POTASSIUM CHLORIDE 20 MEQ
40 PACKET (EA) ORAL ONCE
Qty: 0 | Refills: 0 | Status: COMPLETED | OUTPATIENT
Start: 2017-02-14 | End: 2017-02-14

## 2017-02-14 RX ORDER — FUROSEMIDE 40 MG
80 TABLET ORAL DAILY
Qty: 0 | Refills: 0 | Status: DISCONTINUED | OUTPATIENT
Start: 2017-02-14 | End: 2017-02-15

## 2017-02-14 RX ADMIN — Medication 6 UNIT(S): at 09:11

## 2017-02-14 RX ADMIN — Medication 100 MILLIGRAM(S): at 05:34

## 2017-02-14 RX ADMIN — ISOSORBIDE MONONITRATE 30 MILLIGRAM(S): 60 TABLET, EXTENDED RELEASE ORAL at 12:52

## 2017-02-14 RX ADMIN — Medication 75 MILLIGRAM(S): at 05:34

## 2017-02-14 RX ADMIN — Medication 50 MILLIGRAM(S): at 12:51

## 2017-02-14 RX ADMIN — Medication 1: at 12:50

## 2017-02-14 RX ADMIN — Medication 100 MILLIGRAM(S): at 12:52

## 2017-02-14 RX ADMIN — Medication 2 MILLIGRAM(S): at 21:45

## 2017-02-14 RX ADMIN — Medication 80 MILLIGRAM(S): at 05:34

## 2017-02-14 RX ADMIN — Medication 75 MILLIGRAM(S): at 16:26

## 2017-02-14 RX ADMIN — Medication 75 MILLIGRAM(S): at 21:45

## 2017-02-14 RX ADMIN — DABIGATRAN ETEXILATE MESYLATE 75 MILLIGRAM(S): 150 CAPSULE ORAL at 05:34

## 2017-02-14 RX ADMIN — Medication 1000 UNIT(S): at 12:52

## 2017-02-14 RX ADMIN — Medication 30 MILLIGRAM(S): at 05:34

## 2017-02-14 RX ADMIN — PIPERACILLIN AND TAZOBACTAM 25 GRAM(S): 4; .5 INJECTION, POWDER, LYOPHILIZED, FOR SOLUTION INTRAVENOUS at 05:35

## 2017-02-14 RX ADMIN — DABIGATRAN ETEXILATE MESYLATE 75 MILLIGRAM(S): 150 CAPSULE ORAL at 17:49

## 2017-02-14 RX ADMIN — Medication 40 MILLIEQUIVALENT(S): at 12:51

## 2017-02-14 RX ADMIN — AMLODIPINE BESYLATE 10 MILLIGRAM(S): 2.5 TABLET ORAL at 05:34

## 2017-02-14 RX ADMIN — PIPERACILLIN AND TAZOBACTAM 25 GRAM(S): 4; .5 INJECTION, POWDER, LYOPHILIZED, FOR SOLUTION INTRAVENOUS at 17:49

## 2017-02-14 RX ADMIN — Medication 81 MILLIGRAM(S): at 12:51

## 2017-02-14 RX ADMIN — INSULIN GLARGINE 30 UNIT(S): 100 INJECTION, SOLUTION SUBCUTANEOUS at 21:44

## 2017-02-14 RX ADMIN — SIMVASTATIN 40 MILLIGRAM(S): 20 TABLET, FILM COATED ORAL at 21:46

## 2017-02-14 RX ADMIN — Medication 6 UNIT(S): at 12:51

## 2017-02-15 LAB
ANION GAP SERPL CALC-SCNC: 18 MMOL/L — HIGH (ref 5–17)
BUN SERPL-MCNC: 125 MG/DL — HIGH (ref 7–23)
CALCIUM SERPL-MCNC: 8.5 MG/DL — SIGNIFICANT CHANGE UP (ref 8.4–10.5)
CHLORIDE SERPL-SCNC: 90 MMOL/L — LOW (ref 96–108)
CO2 SERPL-SCNC: 27 MMOL/L — SIGNIFICANT CHANGE UP (ref 22–31)
CREAT SERPL-MCNC: 3.86 MG/DL — HIGH (ref 0.5–1.3)
GLUCOSE SERPL-MCNC: 136 MG/DL — HIGH (ref 70–99)
IGA FLD-MCNC: 366 MG/DL — SIGNIFICANT CHANGE UP (ref 68–378)
IGG FLD-MCNC: 1540 MG/DL — SIGNIFICANT CHANGE UP (ref 694–1618)
IGM SERPL-MCNC: 28 MG/DL — LOW (ref 40–230)
KAPPA LC SER QL IFE: 17.3 MG/DL — HIGH (ref 0.33–1.94)
KAPPA/LAMBDA FREE LIGHT CHAIN RATIO, SERUM: 2.05 RATIO — HIGH (ref 0.26–1.65)
LAMBDA LC SER QL IFE: 8.42 MG/DL — HIGH (ref 0.57–2.63)
POTASSIUM SERPL-MCNC: 3.5 MMOL/L — SIGNIFICANT CHANGE UP (ref 3.5–5.3)
POTASSIUM SERPL-SCNC: 3.5 MMOL/L — SIGNIFICANT CHANGE UP (ref 3.5–5.3)
SODIUM SERPL-SCNC: 135 MMOL/L — SIGNIFICANT CHANGE UP (ref 135–145)

## 2017-02-15 PROCEDURE — 77074 RADEX OSSEOUS SURVEY LMTD: CPT | Mod: 26

## 2017-02-15 RX ORDER — FUROSEMIDE 40 MG
40 TABLET ORAL DAILY
Qty: 0 | Refills: 0 | Status: DISCONTINUED | OUTPATIENT
Start: 2017-02-15 | End: 2017-02-16

## 2017-02-15 RX ADMIN — Medication 6 UNIT(S): at 17:29

## 2017-02-15 RX ADMIN — PIPERACILLIN AND TAZOBACTAM 25 GRAM(S): 4; .5 INJECTION, POWDER, LYOPHILIZED, FOR SOLUTION INTRAVENOUS at 05:27

## 2017-02-15 RX ADMIN — Medication 6 UNIT(S): at 09:46

## 2017-02-15 RX ADMIN — Medication 75 MILLIGRAM(S): at 05:23

## 2017-02-15 RX ADMIN — Medication 100 MILLIGRAM(S): at 23:08

## 2017-02-15 RX ADMIN — Medication 75 MILLIGRAM(S): at 23:08

## 2017-02-15 RX ADMIN — DABIGATRAN ETEXILATE MESYLATE 75 MILLIGRAM(S): 150 CAPSULE ORAL at 17:29

## 2017-02-15 RX ADMIN — ISOSORBIDE MONONITRATE 30 MILLIGRAM(S): 60 TABLET, EXTENDED RELEASE ORAL at 14:01

## 2017-02-15 RX ADMIN — Medication 81 MILLIGRAM(S): at 14:01

## 2017-02-15 RX ADMIN — INSULIN GLARGINE 30 UNIT(S): 100 INJECTION, SOLUTION SUBCUTANEOUS at 23:09

## 2017-02-15 RX ADMIN — Medication 100 MILLIGRAM(S): at 05:25

## 2017-02-15 RX ADMIN — AMLODIPINE BESYLATE 10 MILLIGRAM(S): 2.5 TABLET ORAL at 05:25

## 2017-02-15 RX ADMIN — DABIGATRAN ETEXILATE MESYLATE 75 MILLIGRAM(S): 150 CAPSULE ORAL at 05:25

## 2017-02-15 RX ADMIN — Medication 80 MILLIGRAM(S): at 05:27

## 2017-02-15 RX ADMIN — PIPERACILLIN AND TAZOBACTAM 25 GRAM(S): 4; .5 INJECTION, POWDER, LYOPHILIZED, FOR SOLUTION INTRAVENOUS at 17:29

## 2017-02-15 RX ADMIN — Medication 2 MILLIGRAM(S): at 23:08

## 2017-02-15 RX ADMIN — Medication 50 MILLIGRAM(S): at 14:02

## 2017-02-15 RX ADMIN — Medication 1000 UNIT(S): at 14:01

## 2017-02-15 RX ADMIN — Medication 100 MILLIGRAM(S): at 14:01

## 2017-02-15 RX ADMIN — Medication 6 UNIT(S): at 14:01

## 2017-02-15 RX ADMIN — SENNA PLUS 2 TABLET(S): 8.6 TABLET ORAL at 23:08

## 2017-02-15 RX ADMIN — Medication 75 MILLIGRAM(S): at 14:02

## 2017-02-15 RX ADMIN — SIMVASTATIN 40 MILLIGRAM(S): 20 TABLET, FILM COATED ORAL at 23:08

## 2017-02-16 LAB
ANION GAP SERPL CALC-SCNC: 18 MMOL/L — HIGH (ref 5–17)
BUN SERPL-MCNC: 125 MG/DL — HIGH (ref 7–23)
CALCIUM SERPL-MCNC: 8.5 MG/DL — SIGNIFICANT CHANGE UP (ref 8.4–10.5)
CHLORIDE SERPL-SCNC: 87 MMOL/L — LOW (ref 96–108)
CO2 SERPL-SCNC: 26 MMOL/L — SIGNIFICANT CHANGE UP (ref 22–31)
CREAT SERPL-MCNC: 4 MG/DL — HIGH (ref 0.5–1.3)
GLUCOSE SERPL-MCNC: 227 MG/DL — HIGH (ref 70–99)
HCT VFR BLD CALC: 28.5 % — LOW (ref 39–50)
HGB BLD-MCNC: 9.3 G/DL — LOW (ref 13–17)
KAPPA LC SER QL IFE: 18.3 MG/DL — HIGH (ref 0.33–1.94)
KAPPA/LAMBDA FREE LIGHT CHAIN RATIO, SERUM: 2.57 RATIO — HIGH (ref 0.26–1.65)
LAMBDA LC SER QL IFE: 7.13 MG/DL — HIGH (ref 0.57–2.63)
MCHC RBC-ENTMCNC: 26.7 PG — LOW (ref 27–34)
MCHC RBC-ENTMCNC: 32.7 GM/DL — SIGNIFICANT CHANGE UP (ref 32–36)
MCV RBC AUTO: 81.7 FL — SIGNIFICANT CHANGE UP (ref 80–100)
PLATELET # BLD AUTO: 341 K/UL — SIGNIFICANT CHANGE UP (ref 150–400)
POTASSIUM SERPL-MCNC: 3.8 MMOL/L — SIGNIFICANT CHANGE UP (ref 3.5–5.3)
POTASSIUM SERPL-SCNC: 3.8 MMOL/L — SIGNIFICANT CHANGE UP (ref 3.5–5.3)
PROT SERPL-MCNC: 7.2 G/DL — SIGNIFICANT CHANGE UP (ref 6–8.3)
PROT SERPL-MCNC: 7.2 G/DL — SIGNIFICANT CHANGE UP (ref 6–8.3)
RBC # BLD: 3.49 M/UL — LOW (ref 4.2–5.8)
RBC # FLD: 13.7 % — SIGNIFICANT CHANGE UP (ref 10.3–14.5)
SODIUM SERPL-SCNC: 131 MMOL/L — LOW (ref 135–145)
WBC # BLD: 17 K/UL — HIGH (ref 3.8–10.5)
WBC # FLD AUTO: 17 K/UL — HIGH (ref 3.8–10.5)

## 2017-02-16 RX ORDER — SODIUM CHLORIDE 9 MG/ML
1000 INJECTION INTRAMUSCULAR; INTRAVENOUS; SUBCUTANEOUS
Qty: 0 | Refills: 0 | Status: DISCONTINUED | OUTPATIENT
Start: 2017-02-16 | End: 2017-02-20

## 2017-02-16 RX ADMIN — Medication 75 MILLIGRAM(S): at 22:08

## 2017-02-16 RX ADMIN — AMLODIPINE BESYLATE 10 MILLIGRAM(S): 2.5 TABLET ORAL at 06:38

## 2017-02-16 RX ADMIN — SIMVASTATIN 40 MILLIGRAM(S): 20 TABLET, FILM COATED ORAL at 22:08

## 2017-02-16 RX ADMIN — Medication 100 MILLIGRAM(S): at 06:38

## 2017-02-16 RX ADMIN — SODIUM CHLORIDE 75 MILLILITER(S): 9 INJECTION INTRAMUSCULAR; INTRAVENOUS; SUBCUTANEOUS at 15:12

## 2017-02-16 RX ADMIN — Medication 75 MILLIGRAM(S): at 06:38

## 2017-02-16 RX ADMIN — Medication 100 MILLIGRAM(S): at 22:08

## 2017-02-16 RX ADMIN — Medication 6 UNIT(S): at 13:23

## 2017-02-16 RX ADMIN — Medication 6 UNIT(S): at 09:01

## 2017-02-16 RX ADMIN — Medication 2: at 17:51

## 2017-02-16 RX ADMIN — ISOSORBIDE MONONITRATE 30 MILLIGRAM(S): 60 TABLET, EXTENDED RELEASE ORAL at 13:24

## 2017-02-16 RX ADMIN — PIPERACILLIN AND TAZOBACTAM 25 GRAM(S): 4; .5 INJECTION, POWDER, LYOPHILIZED, FOR SOLUTION INTRAVENOUS at 06:44

## 2017-02-16 RX ADMIN — INSULIN GLARGINE 30 UNIT(S): 100 INJECTION, SOLUTION SUBCUTANEOUS at 22:08

## 2017-02-16 RX ADMIN — Medication 1000 UNIT(S): at 13:24

## 2017-02-16 RX ADMIN — Medication 100 MILLIGRAM(S): at 13:23

## 2017-02-16 RX ADMIN — Medication 75 MILLIGRAM(S): at 15:05

## 2017-02-16 RX ADMIN — Medication 2: at 13:23

## 2017-02-16 RX ADMIN — Medication 40 MILLIGRAM(S): at 06:38

## 2017-02-16 RX ADMIN — DABIGATRAN ETEXILATE MESYLATE 75 MILLIGRAM(S): 150 CAPSULE ORAL at 17:49

## 2017-02-16 RX ADMIN — SODIUM CHLORIDE 75 MILLILITER(S): 9 INJECTION INTRAMUSCULAR; INTRAVENOUS; SUBCUTANEOUS at 20:10

## 2017-02-16 RX ADMIN — Medication 1: at 09:01

## 2017-02-16 RX ADMIN — Medication 6 UNIT(S): at 17:51

## 2017-02-16 RX ADMIN — PIPERACILLIN AND TAZOBACTAM 25 GRAM(S): 4; .5 INJECTION, POWDER, LYOPHILIZED, FOR SOLUTION INTRAVENOUS at 17:48

## 2017-02-16 RX ADMIN — SENNA PLUS 2 TABLET(S): 8.6 TABLET ORAL at 22:08

## 2017-02-16 RX ADMIN — DABIGATRAN ETEXILATE MESYLATE 75 MILLIGRAM(S): 150 CAPSULE ORAL at 06:38

## 2017-02-16 RX ADMIN — Medication 2 MILLIGRAM(S): at 22:08

## 2017-02-17 LAB
% ALBUMIN: 32.5 % — SIGNIFICANT CHANGE UP
% ALPHA 1: 10.3 % — SIGNIFICANT CHANGE UP
% ALPHA 2: 20 % — SIGNIFICANT CHANGE UP
% BETA: 14 % — SIGNIFICANT CHANGE UP
% GAMMA: 23.2 % — SIGNIFICANT CHANGE UP
% M SPIKE: SIGNIFICANT CHANGE UP %
ALBUMIN SERPL ELPH-MCNC: 2.3 G/DL — LOW (ref 3.6–5.5)
ALBUMIN/GLOB SERPL ELPH: 0.5 RATIO — SIGNIFICANT CHANGE UP
ALPHA1 GLOB SERPL ELPH-MCNC: 0.7 G/DL — HIGH (ref 0.1–0.4)
ALPHA2 GLOB SERPL ELPH-MCNC: 1.4 G/DL — HIGH (ref 0.5–1)
ANION GAP SERPL CALC-SCNC: 17 MMOL/L — SIGNIFICANT CHANGE UP (ref 5–17)
B-GLOBULIN SERPL ELPH-MCNC: 1 G/DL — SIGNIFICANT CHANGE UP (ref 0.5–1)
BUN SERPL-MCNC: 126 MG/DL — HIGH (ref 7–23)
CALCIUM SERPL-MCNC: 8.5 MG/DL — SIGNIFICANT CHANGE UP (ref 8.4–10.5)
CHLORIDE SERPL-SCNC: 90 MMOL/L — LOW (ref 96–108)
CO2 SERPL-SCNC: 25 MMOL/L — SIGNIFICANT CHANGE UP (ref 22–31)
CREAT SERPL-MCNC: 4.39 MG/DL — HIGH (ref 0.5–1.3)
GAMMA GLOBULIN: 1.7 G/DL — HIGH (ref 0.6–1.6)
GLUCOSE SERPL-MCNC: 191 MG/DL — HIGH (ref 70–99)
HCT VFR BLD CALC: 27.2 % — LOW (ref 39–50)
HCT VFR BLD CALC: 28.1 % — LOW (ref 39–50)
HGB BLD-MCNC: 8.7 G/DL — LOW (ref 13–17)
HGB BLD-MCNC: 9 G/DL — LOW (ref 13–17)
INR BLD: 1.41 RATIO — HIGH (ref 0.88–1.16)
INTERPRETATION SERPL IFE-IMP: SIGNIFICANT CHANGE UP
M-SPIKE: SIGNIFICANT CHANGE UP G/DL (ref 0–0)
MCHC RBC-ENTMCNC: 25.7 PG — LOW (ref 27–34)
MCHC RBC-ENTMCNC: 27 PG — SIGNIFICANT CHANGE UP (ref 27–34)
MCHC RBC-ENTMCNC: 31 GM/DL — LOW (ref 32–36)
MCHC RBC-ENTMCNC: 32.9 GM/DL — SIGNIFICANT CHANGE UP (ref 32–36)
MCV RBC AUTO: 81.9 FL — SIGNIFICANT CHANGE UP (ref 80–100)
MCV RBC AUTO: 82.9 FL — SIGNIFICANT CHANGE UP (ref 80–100)
PLATELET # BLD AUTO: 340 K/UL — SIGNIFICANT CHANGE UP (ref 150–400)
PLATELET # BLD AUTO: 388 K/UL — SIGNIFICANT CHANGE UP (ref 150–400)
POTASSIUM SERPL-MCNC: 3.6 MMOL/L — SIGNIFICANT CHANGE UP (ref 3.5–5.3)
POTASSIUM SERPL-SCNC: 3.6 MMOL/L — SIGNIFICANT CHANGE UP (ref 3.5–5.3)
PROT PATTERN SERPL ELPH-IMP: SIGNIFICANT CHANGE UP
PROT SERPL-MCNC: 6 G/DL — SIGNIFICANT CHANGE UP (ref 6–8.3)
PROT SERPL-MCNC: 6 G/DL — SIGNIFICANT CHANGE UP (ref 6–8.3)
PROTHROM AB SERPL-ACNC: 16 SEC — HIGH (ref 10–13.1)
RBC # BLD: 3.32 M/UL — LOW (ref 4.2–5.8)
RBC # BLD: 3.39 M/UL — LOW (ref 4.2–5.8)
RBC # FLD: 13.6 % — SIGNIFICANT CHANGE UP (ref 10.3–14.5)
RBC # FLD: 14.7 % — HIGH (ref 10.3–14.5)
SODIUM SERPL-SCNC: 132 MMOL/L — LOW (ref 135–145)
WBC # BLD: 13.22 K/UL — HIGH (ref 3.8–10.5)
WBC # BLD: 15.3 K/UL — HIGH (ref 3.8–10.5)
WBC # FLD AUTO: 13.22 K/UL — HIGH (ref 3.8–10.5)
WBC # FLD AUTO: 15.3 K/UL — HIGH (ref 3.8–10.5)

## 2017-02-17 RX ORDER — HEPARIN SODIUM 5000 [USP'U]/ML
10000 INJECTION INTRAVENOUS; SUBCUTANEOUS EVERY 6 HOURS
Qty: 0 | Refills: 0 | Status: DISCONTINUED | OUTPATIENT
Start: 2017-02-18 | End: 2017-02-23

## 2017-02-17 RX ORDER — HEPARIN SODIUM 5000 [USP'U]/ML
5000 INJECTION INTRAVENOUS; SUBCUTANEOUS EVERY 6 HOURS
Qty: 0 | Refills: 0 | Status: DISCONTINUED | OUTPATIENT
Start: 2017-02-18 | End: 2017-02-23

## 2017-02-17 RX ORDER — HEPARIN SODIUM 5000 [USP'U]/ML
INJECTION INTRAVENOUS; SUBCUTANEOUS
Qty: 25000 | Refills: 0 | Status: DISCONTINUED | OUTPATIENT
Start: 2017-02-18 | End: 2017-02-23

## 2017-02-17 RX ADMIN — INSULIN GLARGINE 30 UNIT(S): 100 INJECTION, SOLUTION SUBCUTANEOUS at 21:55

## 2017-02-17 RX ADMIN — Medication 6 UNIT(S): at 17:11

## 2017-02-17 RX ADMIN — DABIGATRAN ETEXILATE MESYLATE 75 MILLIGRAM(S): 150 CAPSULE ORAL at 17:12

## 2017-02-17 RX ADMIN — SODIUM CHLORIDE 75 MILLILITER(S): 9 INJECTION INTRAMUSCULAR; INTRAVENOUS; SUBCUTANEOUS at 01:00

## 2017-02-17 RX ADMIN — SIMVASTATIN 40 MILLIGRAM(S): 20 TABLET, FILM COATED ORAL at 21:55

## 2017-02-17 RX ADMIN — Medication 100 MILLIGRAM(S): at 21:55

## 2017-02-17 RX ADMIN — Medication 6 UNIT(S): at 08:10

## 2017-02-17 RX ADMIN — Medication 4: at 12:10

## 2017-02-17 RX ADMIN — Medication 2: at 08:10

## 2017-02-17 RX ADMIN — Medication 100 MILLIGRAM(S): at 14:20

## 2017-02-17 RX ADMIN — Medication 75 MILLIGRAM(S): at 21:55

## 2017-02-17 RX ADMIN — AMLODIPINE BESYLATE 10 MILLIGRAM(S): 2.5 TABLET ORAL at 06:14

## 2017-02-17 RX ADMIN — Medication 1: at 21:56

## 2017-02-17 RX ADMIN — Medication 75 MILLIGRAM(S): at 14:20

## 2017-02-17 RX ADMIN — DABIGATRAN ETEXILATE MESYLATE 75 MILLIGRAM(S): 150 CAPSULE ORAL at 06:14

## 2017-02-17 RX ADMIN — Medication: at 17:12

## 2017-02-17 RX ADMIN — Medication 2 MILLIGRAM(S): at 21:55

## 2017-02-17 RX ADMIN — Medication 75 MILLIGRAM(S): at 06:14

## 2017-02-17 RX ADMIN — SENNA PLUS 2 TABLET(S): 8.6 TABLET ORAL at 21:55

## 2017-02-17 RX ADMIN — PIPERACILLIN AND TAZOBACTAM 25 GRAM(S): 4; .5 INJECTION, POWDER, LYOPHILIZED, FOR SOLUTION INTRAVENOUS at 06:15

## 2017-02-17 RX ADMIN — Medication 6 UNIT(S): at 12:10

## 2017-02-17 RX ADMIN — SODIUM CHLORIDE 75 MILLILITER(S): 9 INJECTION INTRAMUSCULAR; INTRAVENOUS; SUBCUTANEOUS at 14:23

## 2017-02-17 RX ADMIN — Medication 1000 UNIT(S): at 12:12

## 2017-02-17 RX ADMIN — Medication 100 MILLIGRAM(S): at 06:14

## 2017-02-17 RX ADMIN — ISOSORBIDE MONONITRATE 30 MILLIGRAM(S): 60 TABLET, EXTENDED RELEASE ORAL at 12:12

## 2017-02-18 LAB
% ALBUMIN: 31.7 % — SIGNIFICANT CHANGE UP
% ALPHA 1: 10.9 % — SIGNIFICANT CHANGE UP
% ALPHA 2: 20.4 % — SIGNIFICANT CHANGE UP
% BETA: 14.1 % — SIGNIFICANT CHANGE UP
% GAMMA: 22.9 % — SIGNIFICANT CHANGE UP
% M SPIKE: SIGNIFICANT CHANGE UP %
ALBUMIN SERPL ELPH-MCNC: 1.9 G/DL — LOW (ref 3.6–5.5)
ALBUMIN/GLOB SERPL ELPH: 0.5 RATIO — SIGNIFICANT CHANGE UP
ALPHA1 GLOB SERPL ELPH-MCNC: 0.7 G/DL — HIGH (ref 0.1–0.4)
ALPHA2 GLOB SERPL ELPH-MCNC: 1.2 G/DL — HIGH (ref 0.5–1)
ANION GAP SERPL CALC-SCNC: 16 MMOL/L — SIGNIFICANT CHANGE UP (ref 5–17)
APTT BLD: 52.7 SEC — HIGH (ref 27.5–37.4)
APTT BLD: 85.5 SEC — HIGH (ref 27.5–37.4)
APTT BLD: 94.8 SEC — HIGH (ref 27.5–37.4)
B-GLOBULIN SERPL ELPH-MCNC: 0.8 G/DL — SIGNIFICANT CHANGE UP (ref 0.5–1)
BUN SERPL-MCNC: 126 MG/DL — HIGH (ref 7–23)
CALCIUM SERPL-MCNC: 7.8 MG/DL — LOW (ref 8.4–10.5)
CHLORIDE SERPL-SCNC: 90 MMOL/L — LOW (ref 96–108)
CO2 SERPL-SCNC: 26 MMOL/L — SIGNIFICANT CHANGE UP (ref 22–31)
CREAT SERPL-MCNC: 4.54 MG/DL — HIGH (ref 0.5–1.3)
CREATININE, URINE RESULT: 77 MG/DL — SIGNIFICANT CHANGE UP
GAMMA GLOBULIN: 1.4 G/DL — SIGNIFICANT CHANGE UP (ref 0.6–1.6)
GLUCOSE SERPL-MCNC: 209 MG/DL — HIGH (ref 70–99)
HCT VFR BLD CALC: 28.5 % — LOW (ref 39–50)
HGB BLD-MCNC: 9.1 G/DL — LOW (ref 13–17)
INR BLD: 1.65 RATIO — HIGH (ref 0.88–1.16)
INTERPRETATION SERPL IFE-IMP: SIGNIFICANT CHANGE UP
M-SPIKE: SIGNIFICANT CHANGE UP G/DL (ref 0–0)
MAGNESIUM SERPL-MCNC: 3 MG/DL — HIGH (ref 1.6–2.6)
MCHC RBC-ENTMCNC: 26.3 PG — LOW (ref 27–34)
MCHC RBC-ENTMCNC: 32 GM/DL — SIGNIFICANT CHANGE UP (ref 32–36)
MCV RBC AUTO: 82.4 FL — SIGNIFICANT CHANGE UP (ref 80–100)
PHOSPHATE SERPL-MCNC: 6.5 MG/DL — HIGH (ref 2.5–4.5)
PLATELET # BLD AUTO: 378 K/UL — SIGNIFICANT CHANGE UP (ref 150–400)
POTASSIUM SERPL-MCNC: 3.6 MMOL/L — SIGNIFICANT CHANGE UP (ref 3.5–5.3)
POTASSIUM SERPL-SCNC: 3.6 MMOL/L — SIGNIFICANT CHANGE UP (ref 3.5–5.3)
PROT ?TM UR-MCNC: 101 MG/DL — HIGH (ref 0–12)
PROT PATTERN SERPL ELPH-IMP: SIGNIFICANT CHANGE UP
PROTHROM AB SERPL-ACNC: 17.9 SEC — HIGH (ref 10–13.1)
RBC # BLD: 3.46 M/UL — LOW (ref 4.2–5.8)
RBC # FLD: 13.3 % — SIGNIFICANT CHANGE UP (ref 10.3–14.5)
SODIUM SERPL-SCNC: 132 MMOL/L — LOW (ref 135–145)
WBC # BLD: 12.6 K/UL — HIGH (ref 3.8–10.5)
WBC # FLD AUTO: 12.6 K/UL — HIGH (ref 3.8–10.5)

## 2017-02-18 RX ORDER — FUROSEMIDE 40 MG
80 TABLET ORAL DAILY
Qty: 0 | Refills: 0 | Status: DISCONTINUED | OUTPATIENT
Start: 2017-02-18 | End: 2017-02-28

## 2017-02-18 RX ADMIN — HEPARIN SODIUM 2400 UNIT(S)/HR: 5000 INJECTION INTRAVENOUS; SUBCUTANEOUS at 06:10

## 2017-02-18 RX ADMIN — Medication 3: at 18:07

## 2017-02-18 RX ADMIN — Medication 75 MILLIGRAM(S): at 18:05

## 2017-02-18 RX ADMIN — Medication 6 UNIT(S): at 08:24

## 2017-02-18 RX ADMIN — INSULIN GLARGINE 30 UNIT(S): 100 INJECTION, SOLUTION SUBCUTANEOUS at 21:47

## 2017-02-18 RX ADMIN — Medication 6 UNIT(S): at 18:08

## 2017-02-18 RX ADMIN — ISOSORBIDE MONONITRATE 30 MILLIGRAM(S): 60 TABLET, EXTENDED RELEASE ORAL at 12:33

## 2017-02-18 RX ADMIN — Medication 100 MILLIGRAM(S): at 06:33

## 2017-02-18 RX ADMIN — Medication 2 MILLIGRAM(S): at 21:47

## 2017-02-18 RX ADMIN — Medication 2: at 21:47

## 2017-02-18 RX ADMIN — SIMVASTATIN 40 MILLIGRAM(S): 20 TABLET, FILM COATED ORAL at 21:47

## 2017-02-18 RX ADMIN — Medication 1000 UNIT(S): at 12:33

## 2017-02-18 RX ADMIN — Medication 80 MILLIGRAM(S): at 12:39

## 2017-02-18 RX ADMIN — HEPARIN SODIUM 2400 UNIT(S)/HR: 5000 INJECTION INTRAVENOUS; SUBCUTANEOUS at 20:33

## 2017-02-18 RX ADMIN — Medication 100 MILLIGRAM(S): at 21:48

## 2017-02-18 RX ADMIN — Medication 75 MILLIGRAM(S): at 06:33

## 2017-02-18 RX ADMIN — Medication 6 UNIT(S): at 12:30

## 2017-02-18 RX ADMIN — HEPARIN SODIUM 5000 UNIT(S): 5000 INJECTION INTRAVENOUS; SUBCUTANEOUS at 06:10

## 2017-02-18 RX ADMIN — HEPARIN SODIUM 2400 UNIT(S)/HR: 5000 INJECTION INTRAVENOUS; SUBCUTANEOUS at 14:20

## 2017-02-18 RX ADMIN — Medication 100 MILLIGRAM(S): at 18:05

## 2017-02-18 RX ADMIN — SENNA PLUS 2 TABLET(S): 8.6 TABLET ORAL at 21:47

## 2017-02-18 RX ADMIN — Medication 2: at 08:24

## 2017-02-18 RX ADMIN — Medication 75 MILLIGRAM(S): at 21:47

## 2017-02-18 RX ADMIN — AMLODIPINE BESYLATE 10 MILLIGRAM(S): 2.5 TABLET ORAL at 06:33

## 2017-02-18 RX ADMIN — Medication 2: at 12:30

## 2017-02-19 LAB
ANION GAP SERPL CALC-SCNC: 17 MMOL/L — SIGNIFICANT CHANGE UP (ref 5–17)
APTT BLD: 51 SEC — HIGH (ref 27.5–37.4)
APTT BLD: 65.9 SEC — HIGH (ref 27.5–37.4)
APTT BLD: 72.9 SEC — HIGH (ref 27.5–37.4)
BUN SERPL-MCNC: 131 MG/DL — HIGH (ref 7–23)
CALCIUM SERPL-MCNC: 8.3 MG/DL — LOW (ref 8.4–10.5)
CHLORIDE SERPL-SCNC: 89 MMOL/L — LOW (ref 96–108)
CO2 SERPL-SCNC: 26 MMOL/L — SIGNIFICANT CHANGE UP (ref 22–31)
CREAT SERPL-MCNC: 4.41 MG/DL — HIGH (ref 0.5–1.3)
GLUCOSE SERPL-MCNC: 244 MG/DL — HIGH (ref 70–99)
HCT VFR BLD CALC: 26.4 % — LOW (ref 39–50)
HGB BLD-MCNC: 8.4 G/DL — LOW (ref 13–17)
MCHC RBC-ENTMCNC: 25.6 PG — LOW (ref 27–34)
MCHC RBC-ENTMCNC: 31.8 GM/DL — LOW (ref 32–36)
MCV RBC AUTO: 80.5 FL — SIGNIFICANT CHANGE UP (ref 80–100)
PLATELET # BLD AUTO: 407 K/UL — HIGH (ref 150–400)
POTASSIUM SERPL-MCNC: 3.5 MMOL/L — SIGNIFICANT CHANGE UP (ref 3.5–5.3)
POTASSIUM SERPL-SCNC: 3.5 MMOL/L — SIGNIFICANT CHANGE UP (ref 3.5–5.3)
RBC # BLD: 3.28 M/UL — LOW (ref 4.2–5.8)
RBC # FLD: 14.6 % — HIGH (ref 10.3–14.5)
SODIUM SERPL-SCNC: 132 MMOL/L — LOW (ref 135–145)
WBC # BLD: 14.69 K/UL — HIGH (ref 3.8–10.5)
WBC # FLD AUTO: 14.69 K/UL — HIGH (ref 3.8–10.5)

## 2017-02-19 RX ADMIN — Medication 75 MILLIGRAM(S): at 06:01

## 2017-02-19 RX ADMIN — Medication 4: at 13:22

## 2017-02-19 RX ADMIN — Medication 80 MILLIGRAM(S): at 06:56

## 2017-02-19 RX ADMIN — Medication 1000 UNIT(S): at 13:25

## 2017-02-19 RX ADMIN — ISOSORBIDE MONONITRATE 30 MILLIGRAM(S): 60 TABLET, EXTENDED RELEASE ORAL at 13:25

## 2017-02-19 RX ADMIN — Medication 6 UNIT(S): at 13:22

## 2017-02-19 RX ADMIN — HEPARIN SODIUM 2700 UNIT(S)/HR: 5000 INJECTION INTRAVENOUS; SUBCUTANEOUS at 17:16

## 2017-02-19 RX ADMIN — Medication 6 UNIT(S): at 08:18

## 2017-02-19 RX ADMIN — SIMVASTATIN 40 MILLIGRAM(S): 20 TABLET, FILM COATED ORAL at 21:55

## 2017-02-19 RX ADMIN — HEPARIN SODIUM 2700 UNIT(S)/HR: 5000 INJECTION INTRAVENOUS; SUBCUTANEOUS at 10:06

## 2017-02-19 RX ADMIN — AMLODIPINE BESYLATE 10 MILLIGRAM(S): 2.5 TABLET ORAL at 06:01

## 2017-02-19 RX ADMIN — Medication: at 22:01

## 2017-02-19 RX ADMIN — SENNA PLUS 2 TABLET(S): 8.6 TABLET ORAL at 21:55

## 2017-02-19 RX ADMIN — Medication 2 MILLIGRAM(S): at 21:55

## 2017-02-19 RX ADMIN — Medication 100 MILLIGRAM(S): at 21:55

## 2017-02-19 RX ADMIN — Medication 6 UNIT(S): at 17:14

## 2017-02-19 RX ADMIN — Medication 100 MILLIGRAM(S): at 06:01

## 2017-02-19 RX ADMIN — HEPARIN SODIUM 2700 UNIT(S)/HR: 5000 INJECTION INTRAVENOUS; SUBCUTANEOUS at 23:59

## 2017-02-19 RX ADMIN — Medication 4: at 17:14

## 2017-02-19 RX ADMIN — Medication 75 MILLIGRAM(S): at 13:25

## 2017-02-19 RX ADMIN — Medication 2: at 08:18

## 2017-02-19 RX ADMIN — HEPARIN SODIUM 5000 UNIT(S): 5000 INJECTION INTRAVENOUS; SUBCUTANEOUS at 10:20

## 2017-02-19 RX ADMIN — INSULIN GLARGINE 30 UNIT(S): 100 INJECTION, SOLUTION SUBCUTANEOUS at 21:56

## 2017-02-19 RX ADMIN — Medication 75 MILLIGRAM(S): at 21:55

## 2017-02-19 RX ADMIN — Medication 100 MILLIGRAM(S): at 13:25

## 2017-02-20 LAB
ANION GAP SERPL CALC-SCNC: 16 MMOL/L — SIGNIFICANT CHANGE UP (ref 5–17)
APTT BLD: 83.4 SEC — HIGH (ref 27.5–37.4)
BUN SERPL-MCNC: 131 MG/DL — HIGH (ref 7–23)
CALCIUM SERPL-MCNC: 8.6 MG/DL — SIGNIFICANT CHANGE UP (ref 8.4–10.5)
CHLORIDE SERPL-SCNC: 88 MMOL/L — LOW (ref 96–108)
CO2 SERPL-SCNC: 29 MMOL/L — SIGNIFICANT CHANGE UP (ref 22–31)
CREAT SERPL-MCNC: 4.22 MG/DL — HIGH (ref 0.5–1.3)
GLUCOSE SERPL-MCNC: 257 MG/DL — HIGH (ref 70–99)
INR BLD: 1.54 RATIO — HIGH (ref 0.88–1.16)
POTASSIUM SERPL-MCNC: 3.4 MMOL/L — LOW (ref 3.5–5.3)
POTASSIUM SERPL-SCNC: 3.4 MMOL/L — LOW (ref 3.5–5.3)
PROTHROM AB SERPL-ACNC: 16.9 SEC — HIGH (ref 10–13.1)
SODIUM SERPL-SCNC: 133 MMOL/L — LOW (ref 135–145)

## 2017-02-20 RX ORDER — POTASSIUM CHLORIDE 20 MEQ
20 PACKET (EA) ORAL
Qty: 0 | Refills: 0 | Status: COMPLETED | OUTPATIENT
Start: 2017-02-20 | End: 2017-02-20

## 2017-02-20 RX ADMIN — Medication 3: at 17:21

## 2017-02-20 RX ADMIN — Medication 100 MILLIGRAM(S): at 05:08

## 2017-02-20 RX ADMIN — Medication 100 MILLIGRAM(S): at 22:10

## 2017-02-20 RX ADMIN — Medication 80 MILLIGRAM(S): at 06:37

## 2017-02-20 RX ADMIN — SIMVASTATIN 40 MILLIGRAM(S): 20 TABLET, FILM COATED ORAL at 22:10

## 2017-02-20 RX ADMIN — Medication 20 MILLIEQUIVALENT(S): at 15:25

## 2017-02-20 RX ADMIN — ISOSORBIDE MONONITRATE 30 MILLIGRAM(S): 60 TABLET, EXTENDED RELEASE ORAL at 12:37

## 2017-02-20 RX ADMIN — Medication 1: at 22:10

## 2017-02-20 RX ADMIN — Medication 2 MILLIGRAM(S): at 22:10

## 2017-02-20 RX ADMIN — Medication 6 UNIT(S): at 08:18

## 2017-02-20 RX ADMIN — HEPARIN SODIUM 2700 UNIT(S)/HR: 5000 INJECTION INTRAVENOUS; SUBCUTANEOUS at 16:18

## 2017-02-20 RX ADMIN — Medication 75 MILLIGRAM(S): at 15:24

## 2017-02-20 RX ADMIN — SENNA PLUS 2 TABLET(S): 8.6 TABLET ORAL at 22:10

## 2017-02-20 RX ADMIN — Medication 75 MILLIGRAM(S): at 05:08

## 2017-02-20 RX ADMIN — Medication 1000 UNIT(S): at 12:37

## 2017-02-20 RX ADMIN — Medication 100 MILLIGRAM(S): at 15:24

## 2017-02-20 RX ADMIN — Medication 75 MILLIGRAM(S): at 22:10

## 2017-02-20 RX ADMIN — INSULIN GLARGINE 30 UNIT(S): 100 INJECTION, SOLUTION SUBCUTANEOUS at 22:11

## 2017-02-20 RX ADMIN — Medication 6 UNIT(S): at 17:21

## 2017-02-20 RX ADMIN — AMLODIPINE BESYLATE 10 MILLIGRAM(S): 2.5 TABLET ORAL at 05:08

## 2017-02-20 RX ADMIN — Medication 3: at 12:37

## 2017-02-20 RX ADMIN — Medication 6 UNIT(S): at 12:37

## 2017-02-20 RX ADMIN — Medication 20 MILLIEQUIVALENT(S): at 12:37

## 2017-02-20 RX ADMIN — Medication 3: at 08:18

## 2017-02-21 LAB
% GAMMA, URINE: 20.8 % — SIGNIFICANT CHANGE UP
ALBUMIN 24H MFR UR ELPH: 49.8 % — SIGNIFICANT CHANGE UP
ALPHA1 GLOB 24H MFR UR ELPH: 6.1 % — SIGNIFICANT CHANGE UP
ALPHA2 GLOB 24H MFR UR ELPH: 12.7 % — SIGNIFICANT CHANGE UP
ANION GAP SERPL CALC-SCNC: 17 MMOL/L — SIGNIFICANT CHANGE UP (ref 5–17)
APTT BLD: 61.4 SEC — HIGH (ref 27.5–37.4)
B-GLOBULIN 24H MFR UR ELPH: 10.6 % — SIGNIFICANT CHANGE UP
BUN SERPL-MCNC: 122 MG/DL — HIGH (ref 7–23)
CALCIUM SERPL-MCNC: 8.6 MG/DL — SIGNIFICANT CHANGE UP (ref 8.4–10.5)
CHLORIDE SERPL-SCNC: 89 MMOL/L — LOW (ref 96–108)
CO2 SERPL-SCNC: 27 MMOL/L — SIGNIFICANT CHANGE UP (ref 22–31)
COLLECT DURATION TIME UR: 24 HR — SIGNIFICANT CHANGE UP
CREAT SERPL-MCNC: 3.97 MG/DL — HIGH (ref 0.5–1.3)
GLUCOSE SERPL-MCNC: 250 MG/DL — HIGH (ref 70–99)
HCT VFR BLD CALC: 27.3 % — LOW (ref 39–50)
HGB BLD-MCNC: 8.4 G/DL — LOW (ref 13–17)
INR BLD: 1.45 RATIO — HIGH (ref 0.88–1.16)
INTERPRETATION 24H UR IFE-IMP: SIGNIFICANT CHANGE UP
M PROTEIN 24H UR ELPH-MRATE: 0 MG/24HR — SIGNIFICANT CHANGE UP (ref 0–0)
M PROTEIN 24H UR ELPH-MRATE: 0 MG/DL — SIGNIFICANT CHANGE UP
MCHC RBC-ENTMCNC: 25.1 PG — LOW (ref 27–34)
MCHC RBC-ENTMCNC: 30.8 GM/DL — LOW (ref 32–36)
MCV RBC AUTO: 81.5 FL — SIGNIFICANT CHANGE UP (ref 80–100)
PLATELET # BLD AUTO: 458 K/UL — HIGH (ref 150–400)
POTASSIUM SERPL-MCNC: 3.3 MMOL/L — LOW (ref 3.5–5.3)
POTASSIUM SERPL-SCNC: 3.3 MMOL/L — LOW (ref 3.5–5.3)
PROT ?TM UR-MCNC: 101 MG/DL — HIGH (ref 0–12)
PROT PATTERN 24H UR ELPH-IMP: SIGNIFICANT CHANGE UP
PROTEIN QUANT CALC, URINE: 1747 MG/24 H — HIGH (ref 50–100)
PROTHROM AB SERPL-ACNC: 16.4 SEC — HIGH (ref 10–13.1)
RBC # BLD: 3.35 M/UL — LOW (ref 4.2–5.8)
RBC # FLD: 14.6 % — HIGH (ref 10.3–14.5)
SODIUM SERPL-SCNC: 133 MMOL/L — LOW (ref 135–145)
TOTAL VOLUME - 24 HOUR: 1730 ML — SIGNIFICANT CHANGE UP
URINE CREATININE CALCULATION: 1.3 G/24 H — SIGNIFICANT CHANGE UP (ref 1–2)
WBC # BLD: 14.4 K/UL — HIGH (ref 3.8–10.5)
WBC # FLD AUTO: 14.4 K/UL — HIGH (ref 3.8–10.5)

## 2017-02-21 RX ORDER — POTASSIUM CHLORIDE 20 MEQ
20 PACKET (EA) ORAL
Qty: 0 | Refills: 0 | Status: COMPLETED | OUTPATIENT
Start: 2017-02-21 | End: 2017-02-21

## 2017-02-21 RX ADMIN — Medication 100 MILLIGRAM(S): at 13:40

## 2017-02-21 RX ADMIN — Medication 3: at 12:38

## 2017-02-21 RX ADMIN — Medication 80 MILLIGRAM(S): at 05:08

## 2017-02-21 RX ADMIN — Medication 20 MILLIEQUIVALENT(S): at 12:39

## 2017-02-21 RX ADMIN — Medication 100 MILLIGRAM(S): at 05:08

## 2017-02-21 RX ADMIN — Medication 75 MILLIGRAM(S): at 13:40

## 2017-02-21 RX ADMIN — Medication 2: at 08:13

## 2017-02-21 RX ADMIN — HEPARIN SODIUM 2700 UNIT(S)/HR: 5000 INJECTION INTRAVENOUS; SUBCUTANEOUS at 10:06

## 2017-02-21 RX ADMIN — SENNA PLUS 2 TABLET(S): 8.6 TABLET ORAL at 21:57

## 2017-02-21 RX ADMIN — AMLODIPINE BESYLATE 10 MILLIGRAM(S): 2.5 TABLET ORAL at 05:08

## 2017-02-21 RX ADMIN — Medication 6 UNIT(S): at 12:38

## 2017-02-21 RX ADMIN — Medication 2 MILLIGRAM(S): at 21:58

## 2017-02-21 RX ADMIN — INSULIN GLARGINE 30 UNIT(S): 100 INJECTION, SOLUTION SUBCUTANEOUS at 21:58

## 2017-02-21 RX ADMIN — Medication 3: at 17:20

## 2017-02-21 RX ADMIN — Medication 1: at 21:59

## 2017-02-21 RX ADMIN — Medication 20 MILLIEQUIVALENT(S): at 13:40

## 2017-02-21 RX ADMIN — Medication 1000 UNIT(S): at 12:39

## 2017-02-21 RX ADMIN — Medication 100 MILLIGRAM(S): at 21:58

## 2017-02-21 RX ADMIN — ISOSORBIDE MONONITRATE 30 MILLIGRAM(S): 60 TABLET, EXTENDED RELEASE ORAL at 12:39

## 2017-02-21 RX ADMIN — Medication 75 MILLIGRAM(S): at 05:08

## 2017-02-21 RX ADMIN — Medication 6 UNIT(S): at 17:20

## 2017-02-21 RX ADMIN — Medication 75 MILLIGRAM(S): at 21:58

## 2017-02-21 RX ADMIN — SIMVASTATIN 40 MILLIGRAM(S): 20 TABLET, FILM COATED ORAL at 21:57

## 2017-02-22 LAB
ANION GAP SERPL CALC-SCNC: 15 MMOL/L — SIGNIFICANT CHANGE UP (ref 5–17)
APTT BLD: 66 SEC — HIGH (ref 27.5–37.4)
BUN SERPL-MCNC: 123 MG/DL — HIGH (ref 7–23)
CALCIUM SERPL-MCNC: 8.8 MG/DL — SIGNIFICANT CHANGE UP (ref 8.4–10.5)
CHLORIDE SERPL-SCNC: 89 MMOL/L — LOW (ref 96–108)
CO2 SERPL-SCNC: 27 MMOL/L — SIGNIFICANT CHANGE UP (ref 22–31)
CREAT SERPL-MCNC: 3.65 MG/DL — HIGH (ref 0.5–1.3)
GLUCOSE SERPL-MCNC: 236 MG/DL — HIGH (ref 70–99)
HCT VFR BLD CALC: 25.7 % — LOW (ref 39–50)
HGB BLD-MCNC: 8.1 G/DL — LOW (ref 13–17)
INR BLD: 1.38 RATIO — HIGH (ref 0.88–1.16)
MCHC RBC-ENTMCNC: 25.4 PG — LOW (ref 27–34)
MCHC RBC-ENTMCNC: 31.5 GM/DL — LOW (ref 32–36)
MCV RBC AUTO: 80.6 FL — SIGNIFICANT CHANGE UP (ref 80–100)
PLATELET # BLD AUTO: 431 K/UL — HIGH (ref 150–400)
POTASSIUM SERPL-MCNC: 3.8 MMOL/L — SIGNIFICANT CHANGE UP (ref 3.5–5.3)
POTASSIUM SERPL-SCNC: 3.8 MMOL/L — SIGNIFICANT CHANGE UP (ref 3.5–5.3)
PROTHROM AB SERPL-ACNC: 15.7 SEC — HIGH (ref 10–13.1)
RBC # BLD: 3.19 M/UL — LOW (ref 4.2–5.8)
RBC # FLD: 14.7 % — HIGH (ref 10.3–14.5)
SODIUM SERPL-SCNC: 131 MMOL/L — LOW (ref 135–145)
WBC # BLD: 12.98 K/UL — HIGH (ref 3.8–10.5)
WBC # FLD AUTO: 12.98 K/UL — HIGH (ref 3.8–10.5)

## 2017-02-22 RX ADMIN — Medication 75 MILLIGRAM(S): at 22:14

## 2017-02-22 RX ADMIN — Medication 6 UNIT(S): at 08:09

## 2017-02-22 RX ADMIN — Medication 100 MILLIGRAM(S): at 05:06

## 2017-02-22 RX ADMIN — Medication 75 MILLIGRAM(S): at 13:07

## 2017-02-22 RX ADMIN — Medication 80 MILLIGRAM(S): at 05:06

## 2017-02-22 RX ADMIN — AMLODIPINE BESYLATE 10 MILLIGRAM(S): 2.5 TABLET ORAL at 05:06

## 2017-02-22 RX ADMIN — Medication 100 MILLIGRAM(S): at 13:07

## 2017-02-22 RX ADMIN — Medication 6 UNIT(S): at 12:35

## 2017-02-22 RX ADMIN — Medication 2 MILLIGRAM(S): at 22:14

## 2017-02-22 RX ADMIN — SENNA PLUS 2 TABLET(S): 8.6 TABLET ORAL at 22:14

## 2017-02-22 RX ADMIN — Medication 4: at 17:10

## 2017-02-22 RX ADMIN — Medication 3: at 12:35

## 2017-02-22 RX ADMIN — Medication 1000 UNIT(S): at 11:41

## 2017-02-22 RX ADMIN — INSULIN GLARGINE 30 UNIT(S): 100 INJECTION, SOLUTION SUBCUTANEOUS at 22:25

## 2017-02-22 RX ADMIN — SIMVASTATIN 40 MILLIGRAM(S): 20 TABLET, FILM COATED ORAL at 22:14

## 2017-02-22 RX ADMIN — Medication 6 UNIT(S): at 17:11

## 2017-02-22 RX ADMIN — HEPARIN SODIUM 2700 UNIT(S)/HR: 5000 INJECTION INTRAVENOUS; SUBCUTANEOUS at 11:40

## 2017-02-22 RX ADMIN — ISOSORBIDE MONONITRATE 30 MILLIGRAM(S): 60 TABLET, EXTENDED RELEASE ORAL at 11:41

## 2017-02-22 RX ADMIN — Medication 100 MILLIGRAM(S): at 22:14

## 2017-02-22 RX ADMIN — Medication 75 MILLIGRAM(S): at 05:06

## 2017-02-22 RX ADMIN — Medication 2: at 08:08

## 2017-02-23 LAB
ANION GAP SERPL CALC-SCNC: 16 MMOL/L — SIGNIFICANT CHANGE UP (ref 5–17)
APTT BLD: 82.7 SEC — HIGH (ref 27.5–37.4)
BUN SERPL-MCNC: 111 MG/DL — HIGH (ref 7–23)
CALCIUM SERPL-MCNC: 8.6 MG/DL — SIGNIFICANT CHANGE UP (ref 8.4–10.5)
CHLORIDE SERPL-SCNC: 92 MMOL/L — LOW (ref 96–108)
CO2 SERPL-SCNC: 28 MMOL/L — SIGNIFICANT CHANGE UP (ref 22–31)
CREAT SERPL-MCNC: 3.46 MG/DL — HIGH (ref 0.5–1.3)
GLUCOSE SERPL-MCNC: 238 MG/DL — HIGH (ref 70–99)
HCT VFR BLD CALC: 25.8 % — LOW (ref 39–50)
HGB BLD-MCNC: 8 G/DL — LOW (ref 13–17)
MCHC RBC-ENTMCNC: 25.2 PG — LOW (ref 27–34)
MCHC RBC-ENTMCNC: 31 GM/DL — LOW (ref 32–36)
MCV RBC AUTO: 81.1 FL — SIGNIFICANT CHANGE UP (ref 80–100)
PLATELET # BLD AUTO: 408 K/UL — HIGH (ref 150–400)
POTASSIUM SERPL-MCNC: 3.9 MMOL/L — SIGNIFICANT CHANGE UP (ref 3.5–5.3)
POTASSIUM SERPL-SCNC: 3.9 MMOL/L — SIGNIFICANT CHANGE UP (ref 3.5–5.3)
RBC # BLD: 3.18 M/UL — LOW (ref 4.2–5.8)
RBC # FLD: 14.8 % — HIGH (ref 10.3–14.5)
SODIUM SERPL-SCNC: 136 MMOL/L — SIGNIFICANT CHANGE UP (ref 135–145)
WBC # BLD: 12.78 K/UL — HIGH (ref 3.8–10.5)
WBC # FLD AUTO: 12.78 K/UL — HIGH (ref 3.8–10.5)

## 2017-02-23 RX ORDER — DABIGATRAN ETEXILATE MESYLATE 150 MG/1
75 CAPSULE ORAL EVERY 12 HOURS
Qty: 0 | Refills: 0 | Status: DISCONTINUED | OUTPATIENT
Start: 2017-02-23 | End: 2017-02-26

## 2017-02-23 RX ORDER — INSULIN GLARGINE 100 [IU]/ML
32 INJECTION, SOLUTION SUBCUTANEOUS AT BEDTIME
Qty: 0 | Refills: 0 | Status: DISCONTINUED | OUTPATIENT
Start: 2017-02-23 | End: 2017-02-28

## 2017-02-23 RX ORDER — INSULIN LISPRO 100/ML
9 VIAL (ML) SUBCUTANEOUS
Qty: 0 | Refills: 0 | Status: DISCONTINUED | OUTPATIENT
Start: 2017-02-23 | End: 2017-02-27

## 2017-02-23 RX ADMIN — Medication 2: at 17:32

## 2017-02-23 RX ADMIN — Medication 75 MILLIGRAM(S): at 14:28

## 2017-02-23 RX ADMIN — DABIGATRAN ETEXILATE MESYLATE 75 MILLIGRAM(S): 150 CAPSULE ORAL at 22:20

## 2017-02-23 RX ADMIN — Medication 100 MILLIGRAM(S): at 05:51

## 2017-02-23 RX ADMIN — Medication 3: at 11:29

## 2017-02-23 RX ADMIN — Medication 75 MILLIGRAM(S): at 22:21

## 2017-02-23 RX ADMIN — Medication 75 MILLIGRAM(S): at 05:51

## 2017-02-23 RX ADMIN — Medication 2 MILLIGRAM(S): at 22:21

## 2017-02-23 RX ADMIN — AMLODIPINE BESYLATE 10 MILLIGRAM(S): 2.5 TABLET ORAL at 05:51

## 2017-02-23 RX ADMIN — ISOSORBIDE MONONITRATE 30 MILLIGRAM(S): 60 TABLET, EXTENDED RELEASE ORAL at 11:30

## 2017-02-23 RX ADMIN — Medication 1000 UNIT(S): at 11:30

## 2017-02-23 RX ADMIN — HEPARIN SODIUM 2700 UNIT(S)/HR: 5000 INJECTION INTRAVENOUS; SUBCUTANEOUS at 06:12

## 2017-02-23 RX ADMIN — Medication 2: at 08:17

## 2017-02-23 RX ADMIN — Medication 80 MILLIGRAM(S): at 05:52

## 2017-02-23 RX ADMIN — Medication 9 UNIT(S): at 17:33

## 2017-02-23 RX ADMIN — SIMVASTATIN 40 MILLIGRAM(S): 20 TABLET, FILM COATED ORAL at 22:21

## 2017-02-23 RX ADMIN — Medication 6 UNIT(S): at 08:18

## 2017-02-23 RX ADMIN — Medication 9 UNIT(S): at 11:29

## 2017-02-23 RX ADMIN — SENNA PLUS 2 TABLET(S): 8.6 TABLET ORAL at 22:21

## 2017-02-23 RX ADMIN — Medication 100 MILLIGRAM(S): at 22:21

## 2017-02-23 RX ADMIN — INSULIN GLARGINE 32 UNIT(S): 100 INJECTION, SOLUTION SUBCUTANEOUS at 22:21

## 2017-02-24 ENCOUNTER — TRANSCRIPTION ENCOUNTER (OUTPATIENT)
Age: 62
End: 2017-02-24

## 2017-02-24 LAB
ANION GAP SERPL CALC-SCNC: 17 MMOL/L — SIGNIFICANT CHANGE UP (ref 5–17)
BUN SERPL-MCNC: 104 MG/DL — HIGH (ref 7–23)
CALCIUM SERPL-MCNC: 8.8 MG/DL — SIGNIFICANT CHANGE UP (ref 8.4–10.5)
CHLORIDE SERPL-SCNC: 92 MMOL/L — LOW (ref 96–108)
CO2 SERPL-SCNC: 25 MMOL/L — SIGNIFICANT CHANGE UP (ref 22–31)
CREAT SERPL-MCNC: 3.34 MG/DL — HIGH (ref 0.5–1.3)
GLUCOSE SERPL-MCNC: 200 MG/DL — HIGH (ref 70–99)
HCT VFR BLD CALC: 25.5 % — LOW (ref 39–50)
HGB BLD-MCNC: 8.1 G/DL — LOW (ref 13–17)
MCHC RBC-ENTMCNC: 26 PG — LOW (ref 27–34)
MCHC RBC-ENTMCNC: 31.6 GM/DL — LOW (ref 32–36)
MCV RBC AUTO: 82.1 FL — SIGNIFICANT CHANGE UP (ref 80–100)
PLATELET # BLD AUTO: 351 K/UL — SIGNIFICANT CHANGE UP (ref 150–400)
POTASSIUM SERPL-MCNC: 3.9 MMOL/L — SIGNIFICANT CHANGE UP (ref 3.5–5.3)
POTASSIUM SERPL-SCNC: 3.9 MMOL/L — SIGNIFICANT CHANGE UP (ref 3.5–5.3)
RBC # BLD: 3.11 M/UL — LOW (ref 4.2–5.8)
RBC # FLD: 13.7 % — SIGNIFICANT CHANGE UP (ref 10.3–14.5)
SODIUM SERPL-SCNC: 134 MMOL/L — LOW (ref 135–145)
WBC # BLD: 11.3 K/UL — HIGH (ref 3.8–10.5)
WBC # FLD AUTO: 11.3 K/UL — HIGH (ref 3.8–10.5)

## 2017-02-24 RX ORDER — ISOSORBIDE MONONITRATE 60 MG/1
1 TABLET, EXTENDED RELEASE ORAL
Qty: 0 | Refills: 0 | COMMUNITY
Start: 2017-02-24

## 2017-02-24 RX ORDER — CHOLECALCIFEROL (VITAMIN D3) 125 MCG
1000 CAPSULE ORAL
Qty: 0 | Refills: 0 | COMMUNITY
Start: 2017-02-24

## 2017-02-24 RX ORDER — SIMVASTATIN 20 MG/1
1 TABLET, FILM COATED ORAL
Qty: 0 | Refills: 0 | COMMUNITY
Start: 2017-02-24

## 2017-02-24 RX ORDER — INSULIN LISPRO 100/ML
0 VIAL (ML) SUBCUTANEOUS
Qty: 0 | Refills: 0 | COMMUNITY
Start: 2017-02-24

## 2017-02-24 RX ORDER — DOXAZOSIN MESYLATE 4 MG
1 TABLET ORAL
Qty: 0 | Refills: 0 | COMMUNITY
Start: 2017-02-24

## 2017-02-24 RX ORDER — METOPROLOL TARTRATE 50 MG
1 TABLET ORAL
Qty: 0 | Refills: 0 | COMMUNITY
Start: 2017-02-24

## 2017-02-24 RX ORDER — AMLODIPINE BESYLATE 2.5 MG/1
1 TABLET ORAL
Qty: 0 | Refills: 0 | COMMUNITY
Start: 2017-02-24

## 2017-02-24 RX ORDER — DABIGATRAN ETEXILATE MESYLATE 150 MG/1
0 CAPSULE ORAL
Qty: 0 | Refills: 0 | COMMUNITY
Start: 2017-02-24

## 2017-02-24 RX ORDER — ISOSORBIDE MONONITRATE 60 MG/1
1 TABLET, EXTENDED RELEASE ORAL
Qty: 0 | Refills: 0 | COMMUNITY

## 2017-02-24 RX ORDER — HYDRALAZINE HCL 50 MG
3 TABLET ORAL
Qty: 0 | Refills: 0 | COMMUNITY
Start: 2017-02-24

## 2017-02-24 RX ORDER — SIMVASTATIN 20 MG/1
1 TABLET, FILM COATED ORAL
Qty: 0 | Refills: 0 | COMMUNITY

## 2017-02-24 RX ORDER — ASPIRIN/CALCIUM CARB/MAGNESIUM 324 MG
1 TABLET ORAL
Qty: 0 | Refills: 0 | DISCHARGE
Start: 2017-02-24

## 2017-02-24 RX ORDER — INSULIN LISPRO 100/ML
9 VIAL (ML) SUBCUTANEOUS
Qty: 0 | Refills: 0 | COMMUNITY
Start: 2017-02-24

## 2017-02-24 RX ORDER — ASPIRIN/CALCIUM CARB/MAGNESIUM 324 MG
81 TABLET ORAL DAILY
Qty: 0 | Refills: 0 | Status: DISCONTINUED | OUTPATIENT
Start: 2017-02-24 | End: 2017-02-28

## 2017-02-24 RX ORDER — INSULIN GLARGINE 100 [IU]/ML
32 INJECTION, SOLUTION SUBCUTANEOUS
Qty: 0 | Refills: 0 | COMMUNITY
Start: 2017-02-24

## 2017-02-24 RX ORDER — DOCUSATE SODIUM 100 MG
1 CAPSULE ORAL
Qty: 0 | Refills: 0 | COMMUNITY
Start: 2017-02-24

## 2017-02-24 RX ORDER — SENNA PLUS 8.6 MG/1
2 TABLET ORAL
Qty: 0 | Refills: 0 | COMMUNITY
Start: 2017-02-24

## 2017-02-24 RX ADMIN — Medication 3: at 12:34

## 2017-02-24 RX ADMIN — Medication 81 MILLIGRAM(S): at 17:10

## 2017-02-24 RX ADMIN — Medication 2 MILLIGRAM(S): at 22:03

## 2017-02-24 RX ADMIN — Medication 75 MILLIGRAM(S): at 12:37

## 2017-02-24 RX ADMIN — ISOSORBIDE MONONITRATE 30 MILLIGRAM(S): 60 TABLET, EXTENDED RELEASE ORAL at 12:36

## 2017-02-24 RX ADMIN — Medication 9 UNIT(S): at 17:10

## 2017-02-24 RX ADMIN — Medication 1000 UNIT(S): at 12:40

## 2017-02-24 RX ADMIN — Medication 2: at 08:07

## 2017-02-24 RX ADMIN — DABIGATRAN ETEXILATE MESYLATE 75 MILLIGRAM(S): 150 CAPSULE ORAL at 09:54

## 2017-02-24 RX ADMIN — AMLODIPINE BESYLATE 10 MILLIGRAM(S): 2.5 TABLET ORAL at 06:03

## 2017-02-24 RX ADMIN — Medication 80 MILLIGRAM(S): at 06:03

## 2017-02-24 RX ADMIN — Medication 3: at 17:10

## 2017-02-24 RX ADMIN — DABIGATRAN ETEXILATE MESYLATE 75 MILLIGRAM(S): 150 CAPSULE ORAL at 22:03

## 2017-02-24 RX ADMIN — Medication 75 MILLIGRAM(S): at 22:03

## 2017-02-24 RX ADMIN — Medication 100 MILLIGRAM(S): at 06:03

## 2017-02-24 RX ADMIN — SIMVASTATIN 40 MILLIGRAM(S): 20 TABLET, FILM COATED ORAL at 22:03

## 2017-02-24 RX ADMIN — Medication 9 UNIT(S): at 12:35

## 2017-02-24 RX ADMIN — Medication 75 MILLIGRAM(S): at 06:03

## 2017-02-24 RX ADMIN — INSULIN GLARGINE 32 UNIT(S): 100 INJECTION, SOLUTION SUBCUTANEOUS at 22:02

## 2017-02-24 RX ADMIN — Medication 100 MILLIGRAM(S): at 17:10

## 2017-02-24 RX ADMIN — Medication 9 UNIT(S): at 08:07

## 2017-02-24 NOTE — DISCHARGE NOTE ADULT - CARE PLAN
Principal Discharge DX:	Acute on chronic congestive heart failure  Goal:	symptom management  Instructions for follow-up, activity and diet:	Weigh yourself daily.  If you gain 3lbs in 3 days, or 5lbs in a week call your Health Care Provider.  Do not eat or drink foods containing more than 2000mg of salt (sodium) in your diet every day.  Call your Health Care Provider if you have any swelling or increased swelling in your feet, ankles, and/or stomach.  Take all of your medication as directed.  If you become dizzy call your Health Care Provider.  Secondary Diagnosis:	Acute kidney injury  Goal:	baseline kidney function  Instructions for follow-up, activity and diet:	Follow up with your physicians after discharge from rehab  Secondary Diagnosis:	DM type 2 (diabetes mellitus, type 2)  Goal:	disease management  Instructions for follow-up, activity and diet:	HgA1C this admission.  Make sure you get your HgA1c checked every three months.  If you take oral diabetes medications, check your blood glucose two times a day.  If you take insulin, check your blood glucose before meals and at bedtime.  It's important not to skip any meals.  Keep a log of your blood glucose results and always take it with you to your doctor appointments.  Keep a list of your current medications including injectables and over the counter medications and bring this medication list with you to all your doctor appointments.  If you have not seen your ophthalmologist this year call for appointment.  Check your feet daily for redness, sores, or openings. Do not self treat. If no improvement in two days call your primary care physician for an appointment.  Low blood sugar (hypoglycemia) is a blood sugar below 70mg/dl. Check your blood sugar if you feel signs/symptoms of hypoglycemia. If your blood sugar is below 70 take 15 grams of carbohydrates (ex 4 oz of apple juice, 3-4 glucose tablets, or 4-6 oz of regular soda) wait 15 minutes and repeat blood sugar to make sure it comes up above 70.  If your blood sugar is above 70 and you are due for a meal, have a meal.  If you are not due for a meal have a snack.  This snack helps keeps your blood sugar at a safe range.  Secondary Diagnosis:	Influenza A  Goal:	resolved  Instructions for follow-up, activity and diet:	Follow up with your physicians  Secondary Diagnosis:	Obstructive sleep apnea  Goal:	disease management  Instructions for follow-up, activity and diet:	Continue CPAP at night as instructed  Follow up with your pulmonologist Principal Discharge DX:	Acute on chronic congestive heart failure  Goal:	symptom management  Instructions for follow-up, activity and diet:	Weigh yourself daily.  If you gain 3lbs in 3 days, or 5lbs in a week call your Health Care Provider.  Do not eat or drink foods containing more than 2000mg of salt (sodium) in your diet every day.  Call your Health Care Provider if you have any swelling or increased swelling in your feet, ankles, and/or stomach.  Take all of your medication as directed.  If you become dizzy call your Health Care Provider.  Secondary Diagnosis:	Acute kidney injury  Goal:	baseline kidney function  Instructions for follow-up, activity and diet:	Follow up with your physicians after discharge from rehab  Follow up BMP in 2 days  Follow up renal in 1 week  Avoid taking (NSAIDs) - (ex: Ibuprofen, Advil, Celebrex, Naprosyn)  Avoid taking any nephrotoxic agents (can harm kidneys) - Intravenous contrast for diagnostic testing, combination cold medications.  Have all medications adjusted for your renal function by your Health Care Provider.  Blood pressure control is important.  Take all medication as prescribed.  Secondary Diagnosis:	DM type 2 (diabetes mellitus, type 2)  Goal:	disease management  Instructions for follow-up, activity and diet:	HgA1C this admission - 8.6  Make sure you get your HgA1c checked every three months.  If you take insulin, check your blood glucose before meals and at bedtime.  It's important not to skip any meals.  Keep a log of your blood glucose results and always take it with you to your doctor appointments.  Keep a list of your current medications including injectables and over the counter medications and bring this medication list with you to all your doctor appointments.  If you have not seen your ophthalmologist this year call for appointment.  Check your feet daily for redness, sores, or openings. Do not self treat. If no improvement in two days call your primary care physician for an appointment.  Low blood sugar (hypoglycemia) is a blood sugar below 70mg/dl. Check your blood sugar if you feel signs/symptoms of hypoglycemia. If your blood sugar is below 70 take 15 grams of carbohydrates (ex 4 oz of apple juice, 3-4 glucose tablets, or 4-6 oz of regular soda) wait 15 minutes and repeat blood sugar to make sure it comes up above 70.  If your blood sugar is above 70 and you are due for a meal, have a meal.  If you are not due for a meal have a snack.  This snack helps keeps your blood sugar at a safe range.  Secondary Diagnosis:	Influenza A  Goal:	resolved  Instructions for follow-up, activity and diet:	Follow up with your physicians  Secondary Diagnosis:	Obstructive sleep apnea  Goal:	disease management  Instructions for follow-up, activity and diet:	Continue CPAP at night as instructed  Follow up with your pulmonologist  Secondary Diagnosis:	Cellulitis  Instructions for follow-up, activity and diet:	Resolved  Secondary Diagnosis:	Anemia of chronic disease  Instructions for follow-up, activity and diet:	Follow up with Dr. Domingo in 2 weeks  Monitor CBC Principal Discharge DX:	Acute on chronic congestive heart failure  Goal:	symptom management  Instructions for follow-up, activity and diet:	Weigh yourself daily.  If you gain 3lbs in 3 days, or 5lbs in a week call your Health Care Provider.  Do not eat or drink foods containing more than 2000mg of salt (sodium) in your diet every day.  Call your Health Care Provider if you have any swelling or increased swelling in your feet, ankles, and/or stomach.  Take all of your medication as directed.  If you become dizzy call your Health Care Provider.  Secondary Diagnosis:	Acute kidney injury  Goal:	baseline kidney function  Instructions for follow-up, activity and diet:	Follow up with your physicians after discharge from rehab  Follow up BMP in 2 days  Follow up renal in 1 week  Avoid taking (NSAIDs) - (ex: Ibuprofen, Advil, Celebrex, Naprosyn)  Avoid taking any nephrotoxic agents (can harm kidneys) - Intravenous contrast for diagnostic testing, combination cold medications.  Have all medications adjusted for your renal function by your Health Care Provider.  Blood pressure control is important.  Take all medication as prescribed.  Secondary Diagnosis:	DM type 2 (diabetes mellitus, type 2)  Goal:	disease management  Instructions for follow-up, activity and diet:	HgA1C this admission - 8.6  Make sure you get your HgA1c checked every three months.  If you take insulin, check your blood glucose before meals and at bedtime.  It's important not to skip any meals.  Keep a log of your blood glucose results and always take it with you to your doctor appointments.  Keep a list of your current medications including injectables and over the counter medications and bring this medication list with you to all your doctor appointments.  If you have not seen your ophthalmologist this year call for appointment.  Check your feet daily for redness, sores, or openings. Do not self treat. If no improvement in two days call your primary care physician for an appointment.  Low blood sugar (hypoglycemia) is a blood sugar below 70mg/dl. Check your blood sugar if you feel signs/symptoms of hypoglycemia. If your blood sugar is below 70 take 15 grams of carbohydrates (ex 4 oz of apple juice, 3-4 glucose tablets, or 4-6 oz of regular soda) wait 15 minutes and repeat blood sugar to make sure it comes up above 70.  If your blood sugar is above 70 and you are due for a meal, have a meal.  If you are not due for a meal have a snack.  This snack helps keeps your blood sugar at a safe range.  Secondary Diagnosis:	Influenza A  Goal:	resolved  Instructions for follow-up, activity and diet:	Follow up with your physicians  Secondary Diagnosis:	Obstructive sleep apnea  Goal:	disease management  Instructions for follow-up, activity and diet:	Continue CPAP at night and PRN while asleep as instructed - CPAP at night (while asleep) - IPAP 15, EPAP 5, FiO2 40%  Follow up with your pulmonologist  Secondary Diagnosis:	Cellulitis  Instructions for follow-up, activity and diet:	Resolved  Secondary Diagnosis:	Anemia of chronic disease  Instructions for follow-up, activity and diet:	Follow up with Dr. Domingo in 2 weeks  Monitor CBC

## 2017-02-24 NOTE — DISCHARGE NOTE ADULT - PROVIDER TOKENS
TOKEN:'[id number]' TOKEN:'[id number]'

## 2017-02-24 NOTE — DISCHARGE NOTE ADULT - HOSPITAL COURSE
to be completed by attending physician Pt with h/o CAD, afib, morbid obesity, ckd presented with anasarca, alex decompensated diastolic CHF treated with IV diuresis. Pt also became flu+ during hospital stay. Cr remained elevated for several days, renal considered a biopsy which was eventually cancelled when patient's renal function gradually improved. He was discharged to rehab with outpt followup.

## 2017-02-24 NOTE — DISCHARGE NOTE ADULT - CARE PROVIDER_API CALL
Yayo Gallegos), Internal Medicine; Nephrology  [address] [facility] [address]  [place] [address]  Phone: [phone]  Fax: [phone]    Colby Mazariegos), Cardiovascular Disease; Internal Medicine; Interventional Cardiology; Nuclear Cardiology  [address] [facility] [address]  [place] [address]  Phone: [phone]  Fax: [phone]    Elvia Domingo), Internal Medicine  [address] [place] [address]  Phone: [phone]  Fax: [phone] Yayo Gallegos), Internal Medicine; Nephrology  [address] [facility] [address]  [place] [address]  Phone: [phone]  Fax: [phone]    Colby Mazariegos), Cardiovascular Disease; Internal Medicine; Interventional Cardiology; Nuclear Cardiology  [address] [facility] [address]  [place] [address]  Phone: [phone]  Fax: [phone]    Elvia Domingo), Internal Medicine  [address] [place] [address]  Phone: [phone]  Fax: [phone] Yayo Gallegos), Internal Medicine; Nephrology  [address] [facility] [address]  [place] [address]  Phone: [phone]  Fax: [phone]    Colby Mazariegos), Cardiovascular Disease; Internal Medicine; Interventional Cardiology; Nuclear Cardiology  [address] [facility] [address]  [place] [address]  Phone: [phone]  Fax: [phone]    Elvai Domingo), Internal Medicine  [address] [place] [address]  Phone: [phone]  Fax: [phone]    Howard Fernandes), Internal Medicine; Pulmonary Disease  [address] [facility] [address]  [place] [address]  Phone: [phone]  Fax: [phone]

## 2017-02-24 NOTE — DISCHARGE NOTE ADULT - PLAN OF CARE
symptom management Weigh yourself daily.  If you gain 3lbs in 3 days, or 5lbs in a week call your Health Care Provider.  Do not eat or drink foods containing more than 2000mg of salt (sodium) in your diet every day.  Call your Health Care Provider if you have any swelling or increased swelling in your feet, ankles, and/or stomach.  Take all of your medication as directed.  If you become dizzy call your Health Care Provider. baseline kidney function Follow up with your physicians after discharge from rehab disease management HgA1C this admission.  Make sure you get your HgA1c checked every three months.  If you take oral diabetes medications, check your blood glucose two times a day.  If you take insulin, check your blood glucose before meals and at bedtime.  It's important not to skip any meals.  Keep a log of your blood glucose results and always take it with you to your doctor appointments.  Keep a list of your current medications including injectables and over the counter medications and bring this medication list with you to all your doctor appointments.  If you have not seen your ophthalmologist this year call for appointment.  Check your feet daily for redness, sores, or openings. Do not self treat. If no improvement in two days call your primary care physician for an appointment.  Low blood sugar (hypoglycemia) is a blood sugar below 70mg/dl. Check your blood sugar if you feel signs/symptoms of hypoglycemia. If your blood sugar is below 70 take 15 grams of carbohydrates (ex 4 oz of apple juice, 3-4 glucose tablets, or 4-6 oz of regular soda) wait 15 minutes and repeat blood sugar to make sure it comes up above 70.  If your blood sugar is above 70 and you are due for a meal, have a meal.  If you are not due for a meal have a snack.  This snack helps keeps your blood sugar at a safe range. resolved Follow up with your physicians Continue CPAP at night as instructed  Follow up with your pulmonologist Resolved Follow up with Dr. Domingo in 2 weeks  Monitor CBC Follow up with your physicians after discharge from rehab  Follow up BMP in 2 days  Follow up renal in 1 week  Avoid taking (NSAIDs) - (ex: Ibuprofen, Advil, Celebrex, Naprosyn)  Avoid taking any nephrotoxic agents (can harm kidneys) - Intravenous contrast for diagnostic testing, combination cold medications.  Have all medications adjusted for your renal function by your Health Care Provider.  Blood pressure control is important.  Take all medication as prescribed. HgA1C this admission - 8.6  Make sure you get your HgA1c checked every three months.  If you take insulin, check your blood glucose before meals and at bedtime.  It's important not to skip any meals.  Keep a log of your blood glucose results and always take it with you to your doctor appointments.  Keep a list of your current medications including injectables and over the counter medications and bring this medication list with you to all your doctor appointments.  If you have not seen your ophthalmologist this year call for appointment.  Check your feet daily for redness, sores, or openings. Do not self treat. If no improvement in two days call your primary care physician for an appointment.  Low blood sugar (hypoglycemia) is a blood sugar below 70mg/dl. Check your blood sugar if you feel signs/symptoms of hypoglycemia. If your blood sugar is below 70 take 15 grams of carbohydrates (ex 4 oz of apple juice, 3-4 glucose tablets, or 4-6 oz of regular soda) wait 15 minutes and repeat blood sugar to make sure it comes up above 70.  If your blood sugar is above 70 and you are due for a meal, have a meal.  If you are not due for a meal have a snack.  This snack helps keeps your blood sugar at a safe range. Continue CPAP at night and PRN while asleep as instructed - CPAP at night (while asleep) - IPAP 15, EPAP 5, FiO2 40%  Follow up with your pulmonologist

## 2017-02-24 NOTE — DISCHARGE NOTE ADULT - SECONDARY DIAGNOSIS.
Acute kidney injury DM type 2 (diabetes mellitus, type 2) Influenza A Obstructive sleep apnea Cellulitis Anemia of chronic disease

## 2017-02-24 NOTE — DISCHARGE NOTE ADULT - MEDICATION SUMMARY - MEDICATIONS TO TAKE
I will START or STAY ON the medications listed below when I get home from the hospital:    aspirin 81 mg oral delayed release tablet  -- 1 tab(s) by mouth once a day  -- Indication: For coronary artery disease    doxazosin 2 mg oral tablet  -- 1 tab(s) by mouth once a day (at bedtime)  -- Indication: For coronary artery disease    isosorbide mononitrate 30 mg oral tablet, extended release  -- 1 tab(s) by mouth once a day  -- Indication: For coronary artery disease    dabigatran 150 mg oral capsule  --  by mouth   -- Indication: For Anticoagulation    insulin glargine  -- 32 unit(s) subcutaneous once a day (at bedtime)  -- Indication: For DM type 2 (diabetes mellitus, type 2)    insulin lispro 100 units/mL subcutaneous solution  --  subcutaneous 3 times a day (before meals); 1 Unit(s) if Glucose 151 - 200  2 Unit(s) if Glucose 201 - 250  3 Unit(s) if Glucose 251 - 300  4 Unit(s) if Glucose 301 - 350  5 Unit(s) if Glucose 351 - 400  6 Unit(s) if Glucose Greater Than 400  -- Indication: For DM type 2 (diabetes mellitus, type 2)    insulin lispro 100 units/mL subcutaneous solution  --  subcutaneous once a day (at bedtime); 0 Unit(s) if Glucose 0 - 250  1 Unit(s) if Glucose 251 - 300  2 Unit(s) if Glucose 301 - 350  3 Unit(s) if Glucose 351 - 400  4 Unit(s) if Glucose Greater Than 400  -- Indication: For DM type 2 (diabetes mellitus, type 2)    insulin lispro 100 units/mL subcutaneous solution  -- 9 unit(s) subcutaneous 3 times a day (before meals)  -- Indication: For DM type 2 (diabetes mellitus, type 2)    simvastatin 40 mg oral tablet  -- 1 tab(s) by mouth once a day (at bedtime)  -- Indication: For cholesterol management    metoprolol succinate 100 mg oral tablet, extended release  -- 1 tab(s) by mouth once a day  -- Indication: For congestive heart failure    amLODIPine 10 mg oral tablet  -- 1 tab(s) by mouth once a day  -- Indication: For hypertension    Lasix 80 mg oral tablet  -- 1 tab(s) by mouth once a day  -- Indication: For Diuretic    docusate sodium 100 mg oral capsule  -- 1 cap(s) by mouth 3 times a day  -- Indication: For laxative    senna oral tablet  -- 2 tab(s) by mouth once a day (at bedtime)  -- Indication: For laxative    hydrALAZINE 25 mg oral tablet  -- 3 tab(s) by mouth 3 times a day  -- Indication: For hypertension    cholecalciferol oral tablet  -- 1000 unit(s) by mouth once a day  -- Indication: For Suppliment I will START or STAY ON the medications listed below when I get home from the hospital:    aspirin 81 mg oral delayed release tablet  -- 1 tab(s) by mouth once a day  -- Indication: For Cardiac and stroke prevention    doxazosin 2 mg oral tablet  -- 1 tab(s) by mouth once a day (at bedtime)  -- Indication: For Urinary retention    isosorbide mononitrate 30 mg oral tablet, extended release  -- 1 tab(s) by mouth once a day  -- Indication: For Angina    dabigatran 75 mg oral capsule  -- 1 cap(s) by mouth 2 times a day  -- Indication: For Afib    insulin lispro 100 units/mL subcutaneous solution  -- 11 unit(s) subcutaneous 3 times a day (before meals)  -- Indication: For Diabetes Mellitus type 2     insulin glargine  -- 32 unit(s) subcutaneous once a day (at bedtime)  -- Indication: For Diabetes Mellitus type 2     insulin lispro 100 units/mL subcutaneous solution  --  subcutaneous 3 times a day (before meals); 1 Unit(s) if Glucose 151 - 200  2 Unit(s) if Glucose 201 - 250  3 Unit(s) if Glucose 251 - 300  4 Unit(s) if Glucose 301 - 350  5 Unit(s) if Glucose 351 - 400  6 Unit(s) if Glucose Greater Than 400  -- Indication: For Diabetes Mellitus type 2     insulin lispro 100 units/mL subcutaneous solution  --  subcutaneous once a day (at bedtime); 0 Unit(s) if Glucose 0 - 250  1 Unit(s) if Glucose 251 - 300  2 Unit(s) if Glucose 301 - 350  3 Unit(s) if Glucose 351 - 400  4 Unit(s) if Glucose Greater Than 400  -- Indication: For Diabetes Mellitus type 2     simvastatin 40 mg oral tablet  -- 1 tab(s) by mouth once a day (at bedtime)  -- Indication: For Hyperlipidemia    metoprolol succinate 100 mg oral tablet, extended release  -- 1 tab(s) by mouth once a day  -- Indication: For Afib and hypertension    amLODIPine 10 mg oral tablet  -- 1 tab(s) by mouth once a day  -- Indication: For Hypertension    Lasix 80 mg oral tablet  -- 1 tab(s) by mouth once a day  -- Indication: For Heart failure    docusate sodium 100 mg oral capsule  -- 1 cap(s) by mouth 3 times a day  -- Indication: For Constipation    senna oral tablet  -- 2 tab(s) by mouth once a day (at bedtime)  -- Indication: For Constipation    oxymetazoline 0.05% nasal spray  -- 1 spray(s) in each nostril once a day  x 3 days  -- Indication: For Dryness and epistaxis    hydrALAZINE 25 mg oral tablet  -- 3 tab(s) by mouth 3 times a day  -- Indication: For Hypertension    cholecalciferol oral tablet  -- 1000 unit(s) by mouth once a day  -- Indication: For Supllement

## 2017-02-24 NOTE — DISCHARGE NOTE ADULT - ADDITIONAL INSTRUCTIONS
Discharge to rehab  Follow up with your physicians when you are discharged from rehab - bring all discharge paperwork including discharge medication list to follow up appointment Discharge to rehab  CPAP at night (while asleep)  Follow up with your physicians when you are discharged from rehab - bring all discharge paperwork including discharge medication list to follow up appointment CPAP at night and PRN (while asleep) - IPAP 15, EPAP 5, FiO2 40%  Follow up with Nephrologist - Dr. Marino in 1 -2 weeks  Follow up with Dr. Mazariegos for heart failure management and possible ischemic work up in 1 - 2 weeks - call for appointment  Follow up with PMD/Rehab MD for blood sugar control  Follow up with Hematologist - dr. Domingo in 1 -2 weeks after discharge to further work up on anemia  Follow up with your physicians when you are discharged from rehab - bring all discharge paperwork including discharge medication list to follow up appointment CPAP at night and PRN (while asleep) - IPAP 15, EPAP 5, FiO2 40%  Follow up with Nephrologist - Dr. Marino in 1 -2 weeks  Follow up with Dr. Mazariegos for heart failure management and possible ischemic work up in 1 - 2 weeks - call for appointment  Follow up with Dr Fernandes - Pulmonologist in 1 - 2 weeks  Follow up with PMD/Rehab MD for blood sugar control  Follow up with Hematologist - dr. Domingo in 1 -2 weeks after discharge to further work up on anemia  Follow up with your physicians when you are discharged from rehab - bring all discharge paperwork including discharge medication list to follow up appointment

## 2017-02-24 NOTE — DISCHARGE NOTE ADULT - FINDINGS/TREATMENT
2/3/17: Renal Ultrasound: FINDINGS:  Right kidney:  11.9 cm. No renal mass, hydronephrosis or gross calculi.  Left kidney:  13.5 cm. No renal mass, hydronephrosis or gross calculi.  Urinary bladder: Within normal limits. 2/4/17: CT Scan - Chest: LUNGS AND PLEURA: There is near complete resolution of nodular opacities   within the right, middle and lower lobes. There is a right middle lobe 6 mm calcified granuloma. There are new moderate bilateral pleural effusions with adjacent compressive atelectasis.  VESSELS: Within normal limits.  HEART: Heart size is normal.There is a new small pericardial effusion.  MEDIASTINUM AND STEPHON: No lymphadenopathy.  CHEST WALL AND LOWER NECK: Within normal limits.  VISUALIZED UPPER ABDOMEN: Within normal limits.  BONES: There are degenerative changes of the visualized spine. 2/7/17: Transthoracic Echocardiogram: Mitral Valve: Mitral annular calcification, otherwise  normal mitral valve.  Aortic Valve/Aorta: Calcified trileaflet aortic valve with normal opening. Aortic Root: 4.1 cm.  LVOT diameter: 2.5 cm.  Left Atrium: Normal left atrium.  Left Ventricle: Normal left ventricular systolic function. Endocardial visualization enhanced with intravenous  injection of echo contrast (Definity). Mild diastolic dysfunction (Stage I).  Right Heart: Normal right atrium. The right ventricle is not well visualized; grossly normal right ventricular  systolic function. Tricuspid valve not well visualized. Minimal tricuspid regurgitation.  Pericardium/Pleura: Normal pericardium with trace pericardial effusion.

## 2017-02-24 NOTE — DISCHARGE NOTE ADULT - MEDICATION SUMMARY - MEDICATIONS TO STOP TAKING
I will STOP taking the medications listed below when I get home from the hospital:    glimepiride 4 mg oral tablet  -- 1 tab(s) by mouth once a day (before a meal)    lisinopril 40 mg oral tablet  -- 1 tab(s) by mouth once a day (at bedtime)    pregabalin 100 mg oral capsule  -- 1 cap(s) by mouth 3 times a day    metFORMIN 1000 mg oral tablet  -- 1 tab(s) by mouth 2 times a day

## 2017-02-24 NOTE — DISCHARGE NOTE ADULT - MEDICATION SUMMARY - MEDICATIONS TO CHANGE
I will SWITCH the dose or number of times a day I take the medications listed below when I get home from the hospital:    furosemide 40 mg oral tablet  -- 1 tab(s) by mouth once a day I will SWITCH the dose or number of times a day I take the medications listed below when I get home from the hospital:    dabigatran 150 mg oral capsule  -- 1 cap(s) by mouth every 12 hours    furosemide 40 mg oral tablet  -- 1 tab(s) by mouth once a day

## 2017-02-24 NOTE — DISCHARGE NOTE ADULT - PATIENT PORTAL LINK FT
“You can access the [facility] Patient Portal, offered by [facility], by registering with the following website: [email or web address]”

## 2017-02-25 LAB
HCT VFR BLD CALC: 25.5 % — LOW (ref 39–50)
HGB BLD-MCNC: 7.9 G/DL — LOW (ref 13–17)
MCHC RBC-ENTMCNC: 25.5 PG — LOW (ref 27–34)
MCHC RBC-ENTMCNC: 31 GM/DL — LOW (ref 32–36)
MCV RBC AUTO: 82.3 FL — SIGNIFICANT CHANGE UP (ref 80–100)
PLATELET # BLD AUTO: 387 K/UL — SIGNIFICANT CHANGE UP (ref 150–400)
RBC # BLD: 3.1 M/UL — LOW (ref 4.2–5.8)
RBC # FLD: 14.8 % — HIGH (ref 10.3–14.5)
WBC # BLD: 11.56 K/UL — HIGH (ref 3.8–10.5)
WBC # FLD AUTO: 11.56 K/UL — HIGH (ref 3.8–10.5)

## 2017-02-25 RX ADMIN — Medication 81 MILLIGRAM(S): at 12:13

## 2017-02-25 RX ADMIN — Medication 100 MILLIGRAM(S): at 22:18

## 2017-02-25 RX ADMIN — SIMVASTATIN 40 MILLIGRAM(S): 20 TABLET, FILM COATED ORAL at 22:19

## 2017-02-25 RX ADMIN — DABIGATRAN ETEXILATE MESYLATE 75 MILLIGRAM(S): 150 CAPSULE ORAL at 12:13

## 2017-02-25 RX ADMIN — Medication 9 UNIT(S): at 17:32

## 2017-02-25 RX ADMIN — Medication 75 MILLIGRAM(S): at 22:19

## 2017-02-25 RX ADMIN — Medication 80 MILLIGRAM(S): at 06:35

## 2017-02-25 RX ADMIN — Medication 9 UNIT(S): at 12:37

## 2017-02-25 RX ADMIN — AMLODIPINE BESYLATE 10 MILLIGRAM(S): 2.5 TABLET ORAL at 06:35

## 2017-02-25 RX ADMIN — Medication 75 MILLIGRAM(S): at 15:19

## 2017-02-25 RX ADMIN — Medication 2: at 12:37

## 2017-02-25 RX ADMIN — Medication 1000 UNIT(S): at 12:15

## 2017-02-25 RX ADMIN — Medication 100 MILLIGRAM(S): at 06:34

## 2017-02-25 RX ADMIN — ISOSORBIDE MONONITRATE 30 MILLIGRAM(S): 60 TABLET, EXTENDED RELEASE ORAL at 12:15

## 2017-02-25 RX ADMIN — INSULIN GLARGINE 32 UNIT(S): 100 INJECTION, SOLUTION SUBCUTANEOUS at 22:19

## 2017-02-25 RX ADMIN — Medication 2 MILLIGRAM(S): at 22:19

## 2017-02-25 RX ADMIN — Medication 9 UNIT(S): at 08:01

## 2017-02-25 RX ADMIN — DABIGATRAN ETEXILATE MESYLATE 75 MILLIGRAM(S): 150 CAPSULE ORAL at 22:19

## 2017-02-25 RX ADMIN — Medication 75 MILLIGRAM(S): at 06:34

## 2017-02-25 RX ADMIN — SENNA PLUS 2 TABLET(S): 8.6 TABLET ORAL at 22:18

## 2017-02-25 RX ADMIN — Medication 2: at 08:01

## 2017-02-25 RX ADMIN — Medication 2: at 17:32

## 2017-02-26 LAB
ANION GAP SERPL CALC-SCNC: 18 MMOL/L — HIGH (ref 5–17)
BUN SERPL-MCNC: 96 MG/DL — HIGH (ref 7–23)
CALCIUM SERPL-MCNC: 9 MG/DL — SIGNIFICANT CHANGE UP (ref 8.4–10.5)
CHLORIDE SERPL-SCNC: 92 MMOL/L — LOW (ref 96–108)
CO2 SERPL-SCNC: 25 MMOL/L — SIGNIFICANT CHANGE UP (ref 22–31)
CREAT SERPL-MCNC: 3.01 MG/DL — HIGH (ref 0.5–1.3)
GLUCOSE SERPL-MCNC: 164 MG/DL — HIGH (ref 70–99)
HCT VFR BLD CALC: 26.5 % — LOW (ref 39–50)
HGB BLD-MCNC: 8.3 G/DL — LOW (ref 13–17)
MCHC RBC-ENTMCNC: 25.6 PG — LOW (ref 27–34)
MCHC RBC-ENTMCNC: 31.3 GM/DL — LOW (ref 32–36)
MCV RBC AUTO: 81.8 FL — SIGNIFICANT CHANGE UP (ref 80–100)
PLATELET # BLD AUTO: 379 K/UL — SIGNIFICANT CHANGE UP (ref 150–400)
POTASSIUM SERPL-MCNC: 3.9 MMOL/L — SIGNIFICANT CHANGE UP (ref 3.5–5.3)
POTASSIUM SERPL-SCNC: 3.9 MMOL/L — SIGNIFICANT CHANGE UP (ref 3.5–5.3)
RBC # BLD: 3.24 M/UL — LOW (ref 4.2–5.8)
RBC # FLD: 14.6 % — HIGH (ref 10.3–14.5)
SODIUM SERPL-SCNC: 135 MMOL/L — SIGNIFICANT CHANGE UP (ref 135–145)
WBC # BLD: 11.28 K/UL — HIGH (ref 3.8–10.5)
WBC # FLD AUTO: 11.28 K/UL — HIGH (ref 3.8–10.5)

## 2017-02-26 RX ADMIN — INSULIN GLARGINE 32 UNIT(S): 100 INJECTION, SOLUTION SUBCUTANEOUS at 22:32

## 2017-02-26 RX ADMIN — Medication 9 UNIT(S): at 17:58

## 2017-02-26 RX ADMIN — Medication 75 MILLIGRAM(S): at 22:32

## 2017-02-26 RX ADMIN — Medication 100 MILLIGRAM(S): at 05:06

## 2017-02-26 RX ADMIN — AMLODIPINE BESYLATE 10 MILLIGRAM(S): 2.5 TABLET ORAL at 05:06

## 2017-02-26 RX ADMIN — Medication 100 MILLIGRAM(S): at 14:02

## 2017-02-26 RX ADMIN — Medication 9 UNIT(S): at 12:43

## 2017-02-26 RX ADMIN — SENNA PLUS 2 TABLET(S): 8.6 TABLET ORAL at 22:32

## 2017-02-26 RX ADMIN — ISOSORBIDE MONONITRATE 30 MILLIGRAM(S): 60 TABLET, EXTENDED RELEASE ORAL at 11:33

## 2017-02-26 RX ADMIN — Medication 81 MILLIGRAM(S): at 11:33

## 2017-02-26 RX ADMIN — Medication 75 MILLIGRAM(S): at 05:06

## 2017-02-26 RX ADMIN — Medication 3: at 17:58

## 2017-02-26 RX ADMIN — Medication 75 MILLIGRAM(S): at 14:03

## 2017-02-26 RX ADMIN — Medication 1000 UNIT(S): at 11:33

## 2017-02-26 RX ADMIN — SIMVASTATIN 40 MILLIGRAM(S): 20 TABLET, FILM COATED ORAL at 22:32

## 2017-02-26 RX ADMIN — Medication 2 MILLIGRAM(S): at 22:32

## 2017-02-26 RX ADMIN — Medication 3: at 12:39

## 2017-02-26 RX ADMIN — Medication 9 UNIT(S): at 08:33

## 2017-02-26 RX ADMIN — DABIGATRAN ETEXILATE MESYLATE 75 MILLIGRAM(S): 150 CAPSULE ORAL at 08:34

## 2017-02-26 RX ADMIN — Medication 1: at 08:32

## 2017-02-26 RX ADMIN — Medication 80 MILLIGRAM(S): at 05:06

## 2017-02-26 RX ADMIN — Medication 100 MILLIGRAM(S): at 22:32

## 2017-02-27 LAB
ANION GAP SERPL CALC-SCNC: 16 MMOL/L — SIGNIFICANT CHANGE UP (ref 5–17)
BASE EXCESS BLDA CALC-SCNC: 7.5 MMOL/L — HIGH (ref -2–2)
BUN SERPL-MCNC: 90 MG/DL — HIGH (ref 7–23)
CALCIUM SERPL-MCNC: 8.8 MG/DL — SIGNIFICANT CHANGE UP (ref 8.4–10.5)
CHLORIDE SERPL-SCNC: 92 MMOL/L — LOW (ref 96–108)
CO2 BLDA-SCNC: 33 MMOL/L — HIGH (ref 22–30)
CO2 SERPL-SCNC: 29 MMOL/L — SIGNIFICANT CHANGE UP (ref 22–31)
CREAT SERPL-MCNC: 2.79 MG/DL — HIGH (ref 0.5–1.3)
GAS PNL BLDA: SIGNIFICANT CHANGE UP
GLUCOSE SERPL-MCNC: 177 MG/DL — HIGH (ref 70–99)
HCO3 BLDA-SCNC: 31 MMOL/L — HIGH (ref 21–29)
HCT VFR BLD CALC: 29.8 % — LOW (ref 39–50)
HGB BLD-MCNC: 9.2 G/DL — LOW (ref 13–17)
HOROWITZ INDEX BLDA+IHG-RTO: 30 — SIGNIFICANT CHANGE UP
MCHC RBC-ENTMCNC: 25.5 PG — LOW (ref 27–34)
MCHC RBC-ENTMCNC: 30.9 GM/DL — LOW (ref 32–36)
MCV RBC AUTO: 82.5 FL — SIGNIFICANT CHANGE UP (ref 80–100)
PCO2 BLDA: 42 MMHG — SIGNIFICANT CHANGE UP (ref 32–46)
PH BLDA: 7.49 — HIGH (ref 7.35–7.45)
PLATELET # BLD AUTO: 346 K/UL — SIGNIFICANT CHANGE UP (ref 150–400)
PO2 BLDA: 85 MMHG — SIGNIFICANT CHANGE UP (ref 74–108)
POTASSIUM SERPL-MCNC: 3.9 MMOL/L — SIGNIFICANT CHANGE UP (ref 3.5–5.3)
POTASSIUM SERPL-SCNC: 3.9 MMOL/L — SIGNIFICANT CHANGE UP (ref 3.5–5.3)
RBC # BLD: 3.61 M/UL — LOW (ref 4.2–5.8)
RBC # FLD: 14.7 % — HIGH (ref 10.3–14.5)
SAO2 % BLDA: 98 % — HIGH (ref 92–96)
SODIUM SERPL-SCNC: 137 MMOL/L — SIGNIFICANT CHANGE UP (ref 135–145)
WBC # BLD: 10.93 K/UL — HIGH (ref 3.8–10.5)
WBC # FLD AUTO: 10.93 K/UL — HIGH (ref 3.8–10.5)

## 2017-02-27 RX ORDER — INSULIN LISPRO 100/ML
11 VIAL (ML) SUBCUTANEOUS
Qty: 0 | Refills: 0 | Status: DISCONTINUED | OUTPATIENT
Start: 2017-02-27 | End: 2017-02-28

## 2017-02-27 RX ORDER — OXYMETAZOLINE HYDROCHLORIDE 0.5 MG/ML
2 SPRAY NASAL
Qty: 0 | Refills: 0 | Status: DISCONTINUED | OUTPATIENT
Start: 2017-02-27 | End: 2017-02-28

## 2017-02-27 RX ORDER — DABIGATRAN ETEXILATE MESYLATE 150 MG/1
75 CAPSULE ORAL EVERY 12 HOURS
Qty: 0 | Refills: 0 | Status: DISCONTINUED | OUTPATIENT
Start: 2017-02-27 | End: 2017-02-28

## 2017-02-27 RX ADMIN — Medication 1000 UNIT(S): at 11:32

## 2017-02-27 RX ADMIN — Medication 2: at 17:39

## 2017-02-27 RX ADMIN — Medication 75 MILLIGRAM(S): at 17:40

## 2017-02-27 RX ADMIN — Medication 1: at 12:20

## 2017-02-27 RX ADMIN — Medication 9 UNIT(S): at 17:39

## 2017-02-27 RX ADMIN — Medication 9 UNIT(S): at 08:19

## 2017-02-27 RX ADMIN — Medication 75 MILLIGRAM(S): at 21:31

## 2017-02-27 RX ADMIN — DABIGATRAN ETEXILATE MESYLATE 75 MILLIGRAM(S): 150 CAPSULE ORAL at 17:40

## 2017-02-27 RX ADMIN — AMLODIPINE BESYLATE 10 MILLIGRAM(S): 2.5 TABLET ORAL at 05:16

## 2017-02-27 RX ADMIN — Medication 100 MILLIGRAM(S): at 21:31

## 2017-02-27 RX ADMIN — Medication 100 MILLIGRAM(S): at 05:16

## 2017-02-27 RX ADMIN — Medication 9 UNIT(S): at 12:20

## 2017-02-27 RX ADMIN — Medication 2 MILLIGRAM(S): at 21:31

## 2017-02-27 RX ADMIN — Medication 80 MILLIGRAM(S): at 05:16

## 2017-02-27 RX ADMIN — SIMVASTATIN 40 MILLIGRAM(S): 20 TABLET, FILM COATED ORAL at 21:31

## 2017-02-27 RX ADMIN — Medication 75 MILLIGRAM(S): at 05:16

## 2017-02-27 RX ADMIN — DABIGATRAN ETEXILATE MESYLATE 75 MILLIGRAM(S): 150 CAPSULE ORAL at 11:32

## 2017-02-27 RX ADMIN — Medication 1: at 08:18

## 2017-02-27 RX ADMIN — Medication 81 MILLIGRAM(S): at 11:32

## 2017-02-27 RX ADMIN — ISOSORBIDE MONONITRATE 30 MILLIGRAM(S): 60 TABLET, EXTENDED RELEASE ORAL at 11:32

## 2017-02-27 RX ADMIN — SENNA PLUS 2 TABLET(S): 8.6 TABLET ORAL at 21:31

## 2017-02-27 RX ADMIN — INSULIN GLARGINE 32 UNIT(S): 100 INJECTION, SOLUTION SUBCUTANEOUS at 21:32

## 2017-02-28 VITALS
DIASTOLIC BLOOD PRESSURE: 79 MMHG | RESPIRATION RATE: 17 BRPM | OXYGEN SATURATION: 95 % | TEMPERATURE: 98 F | SYSTOLIC BLOOD PRESSURE: 136 MMHG | HEART RATE: 75 BPM

## 2017-02-28 LAB
ANION GAP SERPL CALC-SCNC: 14 MMOL/L — SIGNIFICANT CHANGE UP (ref 5–17)
BUN SERPL-MCNC: 78 MG/DL — HIGH (ref 7–23)
CALCIUM SERPL-MCNC: 8.7 MG/DL — SIGNIFICANT CHANGE UP (ref 8.4–10.5)
CHLORIDE SERPL-SCNC: 94 MMOL/L — LOW (ref 96–108)
CO2 SERPL-SCNC: 29 MMOL/L — SIGNIFICANT CHANGE UP (ref 22–31)
CREAT SERPL-MCNC: 2.74 MG/DL — HIGH (ref 0.5–1.3)
GLUCOSE SERPL-MCNC: 137 MG/DL — HIGH (ref 70–99)
HCT VFR BLD CALC: 28.4 % — LOW (ref 39–50)
HGB BLD-MCNC: 8.5 G/DL — LOW (ref 13–17)
MCHC RBC-ENTMCNC: 25.1 PG — LOW (ref 27–34)
MCHC RBC-ENTMCNC: 29.9 GM/DL — LOW (ref 32–36)
MCV RBC AUTO: 83.8 FL — SIGNIFICANT CHANGE UP (ref 80–100)
PLATELET # BLD AUTO: 359 K/UL — SIGNIFICANT CHANGE UP (ref 150–400)
POTASSIUM SERPL-MCNC: 3.9 MMOL/L — SIGNIFICANT CHANGE UP (ref 3.5–5.3)
POTASSIUM SERPL-SCNC: 3.9 MMOL/L — SIGNIFICANT CHANGE UP (ref 3.5–5.3)
RBC # BLD: 3.39 M/UL — LOW (ref 4.2–5.8)
RBC # FLD: 14.7 % — HIGH (ref 10.3–14.5)
SODIUM SERPL-SCNC: 137 MMOL/L — SIGNIFICANT CHANGE UP (ref 135–145)
WBC # BLD: 10.94 K/UL — HIGH (ref 3.8–10.5)
WBC # FLD AUTO: 10.94 K/UL — HIGH (ref 3.8–10.5)

## 2017-02-28 PROCEDURE — 85014 HEMATOCRIT: CPT

## 2017-02-28 PROCEDURE — 80053 COMPREHEN METABOLIC PANEL: CPT

## 2017-02-28 PROCEDURE — 93971 EXTREMITY STUDY: CPT

## 2017-02-28 PROCEDURE — 86850 RBC ANTIBODY SCREEN: CPT

## 2017-02-28 PROCEDURE — 80202 ASSAY OF VANCOMYCIN: CPT

## 2017-02-28 PROCEDURE — 86880 COOMBS TEST DIRECT: CPT

## 2017-02-28 PROCEDURE — 84484 ASSAY OF TROPONIN QUANT: CPT

## 2017-02-28 PROCEDURE — C8929: CPT

## 2017-02-28 PROCEDURE — 86334 IMMUNOFIX E-PHORESIS SERUM: CPT

## 2017-02-28 PROCEDURE — 82009 KETONE BODYS QUAL: CPT

## 2017-02-28 PROCEDURE — 97110 THERAPEUTIC EXERCISES: CPT

## 2017-02-28 PROCEDURE — 84295 ASSAY OF SERUM SODIUM: CPT

## 2017-02-28 PROCEDURE — 84166 PROTEIN E-PHORESIS/URINE/CSF: CPT

## 2017-02-28 PROCEDURE — 87340 HEPATITIS B SURFACE AG IA: CPT

## 2017-02-28 PROCEDURE — 84100 ASSAY OF PHOSPHORUS: CPT

## 2017-02-28 PROCEDURE — 71250 CT THORAX DX C-: CPT

## 2017-02-28 PROCEDURE — 83605 ASSAY OF LACTIC ACID: CPT

## 2017-02-28 PROCEDURE — 82550 ASSAY OF CK (CPK): CPT

## 2017-02-28 PROCEDURE — 83615 LACTATE (LD) (LDH) ENZYME: CPT

## 2017-02-28 PROCEDURE — 82435 ASSAY OF BLOOD CHLORIDE: CPT

## 2017-02-28 PROCEDURE — 99285 EMERGENCY DEPT VISIT HI MDM: CPT | Mod: 25

## 2017-02-28 PROCEDURE — 87186 SC STD MICRODIL/AGAR DIL: CPT

## 2017-02-28 PROCEDURE — 36600 WITHDRAWAL OF ARTERIAL BLOOD: CPT

## 2017-02-28 PROCEDURE — 85045 AUTOMATED RETICULOCYTE COUNT: CPT

## 2017-02-28 PROCEDURE — 87449 NOS EACH ORGANISM AG IA: CPT

## 2017-02-28 PROCEDURE — 77074 RADEX OSSEOUS SURVEY LMTD: CPT

## 2017-02-28 PROCEDURE — 86160 COMPLEMENT ANTIGEN: CPT

## 2017-02-28 PROCEDURE — 86706 HEP B SURFACE ANTIBODY: CPT

## 2017-02-28 PROCEDURE — 84165 PROTEIN E-PHORESIS SERUM: CPT

## 2017-02-28 PROCEDURE — 82746 ASSAY OF FOLIC ACID SERUM: CPT

## 2017-02-28 PROCEDURE — 97162 PT EVAL MOD COMPLEX 30 MIN: CPT

## 2017-02-28 PROCEDURE — 87086 URINE CULTURE/COLONY COUNT: CPT

## 2017-02-28 PROCEDURE — 93970 EXTREMITY STUDY: CPT

## 2017-02-28 PROCEDURE — 86803 HEPATITIS C AB TEST: CPT

## 2017-02-28 PROCEDURE — 70450 CT HEAD/BRAIN W/O DYE: CPT

## 2017-02-28 PROCEDURE — 86901 BLOOD TYPING SEROLOGIC RH(D): CPT

## 2017-02-28 PROCEDURE — 87798 DETECT AGENT NOS DNA AMP: CPT

## 2017-02-28 PROCEDURE — 74176 CT ABD & PELVIS W/O CONTRAST: CPT

## 2017-02-28 PROCEDURE — 96374 THER/PROPH/DIAG INJ IV PUSH: CPT

## 2017-02-28 PROCEDURE — 83880 ASSAY OF NATRIURETIC PEPTIDE: CPT

## 2017-02-28 PROCEDURE — 81001 URINALYSIS AUTO W/SCOPE: CPT

## 2017-02-28 PROCEDURE — 87633 RESP VIRUS 12-25 TARGETS: CPT

## 2017-02-28 PROCEDURE — 83010 ASSAY OF HAPTOGLOBIN QUANT: CPT

## 2017-02-28 PROCEDURE — 84132 ASSAY OF SERUM POTASSIUM: CPT

## 2017-02-28 PROCEDURE — 82784 ASSAY IGA/IGD/IGG/IGM EACH: CPT

## 2017-02-28 PROCEDURE — 97116 GAIT TRAINING THERAPY: CPT

## 2017-02-28 PROCEDURE — 82570 ASSAY OF URINE CREATININE: CPT

## 2017-02-28 PROCEDURE — 83036 HEMOGLOBIN GLYCOSYLATED A1C: CPT

## 2017-02-28 PROCEDURE — 82728 ASSAY OF FERRITIN: CPT

## 2017-02-28 PROCEDURE — 87205 SMEAR GRAM STAIN: CPT

## 2017-02-28 PROCEDURE — 82803 BLOOD GASES ANY COMBINATION: CPT

## 2017-02-28 PROCEDURE — 86900 BLOOD TYPING SEROLOGIC ABO: CPT

## 2017-02-28 PROCEDURE — 85610 PROTHROMBIN TIME: CPT

## 2017-02-28 PROCEDURE — 76775 US EXAM ABDO BACK WALL LIM: CPT

## 2017-02-28 PROCEDURE — 82043 UR ALBUMIN QUANTITATIVE: CPT

## 2017-02-28 PROCEDURE — 85027 COMPLETE CBC AUTOMATED: CPT

## 2017-02-28 PROCEDURE — 85730 THROMBOPLASTIN TIME PARTIAL: CPT

## 2017-02-28 PROCEDURE — 83735 ASSAY OF MAGNESIUM: CPT

## 2017-02-28 PROCEDURE — 83521 IG LIGHT CHAINS FREE EACH: CPT

## 2017-02-28 PROCEDURE — 84156 ASSAY OF PROTEIN URINE: CPT

## 2017-02-28 PROCEDURE — 80048 BASIC METABOLIC PNL TOTAL CA: CPT

## 2017-02-28 PROCEDURE — 82607 VITAMIN B-12: CPT

## 2017-02-28 PROCEDURE — 83550 IRON BINDING TEST: CPT

## 2017-02-28 PROCEDURE — 71046 X-RAY EXAM CHEST 2 VIEWS: CPT

## 2017-02-28 PROCEDURE — 71045 X-RAY EXAM CHEST 1 VIEW: CPT

## 2017-02-28 PROCEDURE — 87040 BLOOD CULTURE FOR BACTERIA: CPT

## 2017-02-28 PROCEDURE — 83516 IMMUNOASSAY NONANTIBODY: CPT

## 2017-02-28 PROCEDURE — 93005 ELECTROCARDIOGRAM TRACING: CPT

## 2017-02-28 PROCEDURE — 86225 DNA ANTIBODY NATIVE: CPT

## 2017-02-28 PROCEDURE — 97530 THERAPEUTIC ACTIVITIES: CPT

## 2017-02-28 PROCEDURE — 82330 ASSAY OF CALCIUM: CPT

## 2017-02-28 PROCEDURE — 82553 CREATINE MB FRACTION: CPT

## 2017-02-28 PROCEDURE — 94660 CPAP INITIATION&MGMT: CPT

## 2017-02-28 PROCEDURE — 82947 ASSAY GLUCOSE BLOOD QUANT: CPT

## 2017-02-28 PROCEDURE — 87486 CHLMYD PNEUM DNA AMP PROBE: CPT

## 2017-02-28 PROCEDURE — 82140 ASSAY OF AMMONIA: CPT

## 2017-02-28 PROCEDURE — 87581 M.PNEUMON DNA AMP PROBE: CPT

## 2017-02-28 PROCEDURE — 84155 ASSAY OF PROTEIN SERUM: CPT

## 2017-02-28 RX ORDER — OXYMETAZOLINE HYDROCHLORIDE 0.5 MG/ML
1 SPRAY NASAL
Qty: 0 | Refills: 0 | COMMUNITY
Start: 2017-02-28

## 2017-02-28 RX ORDER — INSULIN LISPRO 100/ML
11 VIAL (ML) SUBCUTANEOUS
Qty: 0 | Refills: 0 | COMMUNITY
Start: 2017-02-28

## 2017-02-28 RX ORDER — FUROSEMIDE 40 MG
80 TABLET ORAL DAILY
Qty: 0 | Refills: 0 | Status: DISCONTINUED | OUTPATIENT
Start: 2017-02-28 | End: 2017-02-28

## 2017-02-28 RX ADMIN — Medication 1000 UNIT(S): at 12:19

## 2017-02-28 RX ADMIN — DABIGATRAN ETEXILATE MESYLATE 75 MILLIGRAM(S): 150 CAPSULE ORAL at 05:56

## 2017-02-28 RX ADMIN — Medication 11 UNIT(S): at 12:19

## 2017-02-28 RX ADMIN — Medication 75 MILLIGRAM(S): at 05:23

## 2017-02-28 RX ADMIN — Medication 81 MILLIGRAM(S): at 12:19

## 2017-02-28 RX ADMIN — AMLODIPINE BESYLATE 10 MILLIGRAM(S): 2.5 TABLET ORAL at 05:23

## 2017-02-28 RX ADMIN — Medication 100 MILLIGRAM(S): at 05:23

## 2017-02-28 RX ADMIN — ISOSORBIDE MONONITRATE 30 MILLIGRAM(S): 60 TABLET, EXTENDED RELEASE ORAL at 12:19

## 2017-02-28 RX ADMIN — Medication 11 UNIT(S): at 07:59

## 2017-02-28 RX ADMIN — Medication 80 MILLIGRAM(S): at 05:22

## 2017-02-28 NOTE — PHYSICAL THERAPY INITIAL EVALUATION ADULT - IMPAIRMENTS CONTRIBUTING TO GAIT DEVIATIONS, PT EVAL
"Vm \"refill her potassium for 90 days\"  " impaired postural control/impaired coordination/impaired balance/decreased strength

## 2017-05-08 ENCOUNTER — APPOINTMENT (OUTPATIENT)
Dept: WOUND CARE | Facility: CLINIC | Age: 62
End: 2017-05-08

## 2017-05-08 VITALS
HEIGHT: 72 IN | BODY MASS INDEX: 39.55 KG/M2 | DIASTOLIC BLOOD PRESSURE: 78 MMHG | TEMPERATURE: 98.6 F | SYSTOLIC BLOOD PRESSURE: 130 MMHG | WEIGHT: 292 LBS | HEART RATE: 70 BPM

## 2017-05-08 DIAGNOSIS — S81.801A UNSPECIFIED OPEN WOUND, RIGHT LOWER LEG, INITIAL ENCOUNTER: ICD-10-CM

## 2017-05-15 ENCOUNTER — APPOINTMENT (OUTPATIENT)
Dept: WOUND CARE | Facility: CLINIC | Age: 62
End: 2017-05-15

## 2017-05-19 ENCOUNTER — APPOINTMENT (OUTPATIENT)
Dept: VASCULAR SURGERY | Facility: CLINIC | Age: 62
End: 2017-05-19

## 2017-05-30 PROBLEM — S81.801A WOUND OF RIGHT LOWER EXTREMITY, INITIAL ENCOUNTER: Status: ACTIVE | Noted: 2017-05-30

## 2017-05-31 ENCOUNTER — APPOINTMENT (OUTPATIENT)
Dept: WOUND CARE | Facility: CLINIC | Age: 62
End: 2017-05-31

## 2017-05-31 VITALS
WEIGHT: 292 LBS | HEART RATE: 77 BPM | DIASTOLIC BLOOD PRESSURE: 78 MMHG | TEMPERATURE: 97.6 F | SYSTOLIC BLOOD PRESSURE: 128 MMHG | BODY MASS INDEX: 39.55 KG/M2 | RESPIRATION RATE: 16 BRPM | HEIGHT: 72 IN

## 2017-05-31 DIAGNOSIS — T14.90 INJURY, UNSPECIFIED: ICD-10-CM

## 2017-08-14 NOTE — DIETITIAN INITIAL EVALUATION ADULT. - PATIENT PROFILE REVIEWED
As above  
Recheck TSH  
The current medical regimen is effective;  continue present plan and medications.   
The current medical regimen is effective;  continue present plan and medications.   
The current medical regimen is effective;  continue present plan and medications. Counseled that she may stop her amlodipine 5mg and monitor her pressure closely.    
yes

## 2018-05-01 ENCOUNTER — INPATIENT (INPATIENT)
Facility: HOSPITAL | Age: 63
LOS: 1 days | Discharge: HOME | DRG: 603 | End: 2018-05-03
Attending: INTERNAL MEDICINE | Admitting: INTERNAL MEDICINE
Payer: COMMERCIAL

## 2018-05-01 VITALS
WEIGHT: 302.92 LBS | OXYGEN SATURATION: 96 % | DIASTOLIC BLOOD PRESSURE: 73 MMHG | TEMPERATURE: 98 F | SYSTOLIC BLOOD PRESSURE: 124 MMHG | RESPIRATION RATE: 20 BRPM | HEART RATE: 100 BPM

## 2018-05-01 DIAGNOSIS — L03.90 CELLULITIS, UNSPECIFIED: ICD-10-CM

## 2018-05-01 DIAGNOSIS — Z95.1 PRESENCE OF AORTOCORONARY BYPASS GRAFT: Chronic | ICD-10-CM

## 2018-05-01 DIAGNOSIS — Z95.5 PRESENCE OF CORONARY ANGIOPLASTY IMPLANT AND GRAFT: Chronic | ICD-10-CM

## 2018-05-01 DIAGNOSIS — Z98.89 OTHER SPECIFIED POSTPROCEDURAL STATES: Chronic | ICD-10-CM

## 2018-05-01 LAB
ALBUMIN SERPL ELPH-MCNC: 3.9 G/DL — SIGNIFICANT CHANGE UP (ref 3.3–5)
ALP SERPL-CCNC: 82 U/L — SIGNIFICANT CHANGE UP (ref 40–120)
ALT FLD-CCNC: 7 U/L — LOW (ref 10–45)
ANION GAP SERPL CALC-SCNC: 13 MMOL/L — SIGNIFICANT CHANGE UP (ref 5–17)
APTT BLD: 53.8 SEC — HIGH (ref 27.5–37.4)
AST SERPL-CCNC: 17 U/L — SIGNIFICANT CHANGE UP (ref 10–40)
BASOPHILS # BLD AUTO: 0 K/UL — SIGNIFICANT CHANGE UP (ref 0–0.2)
BASOPHILS NFR BLD AUTO: 0.4 % — SIGNIFICANT CHANGE UP (ref 0–2)
BILIRUB SERPL-MCNC: 0.5 MG/DL — SIGNIFICANT CHANGE UP (ref 0.2–1.2)
BUN SERPL-MCNC: 66 MG/DL — HIGH (ref 7–23)
CALCIUM SERPL-MCNC: 9.9 MG/DL — SIGNIFICANT CHANGE UP (ref 8.4–10.5)
CHLORIDE SERPL-SCNC: 94 MMOL/L — LOW (ref 96–108)
CO2 SERPL-SCNC: 30 MMOL/L — SIGNIFICANT CHANGE UP (ref 22–31)
CREAT SERPL-MCNC: 2.31 MG/DL — HIGH (ref 0.5–1.3)
CRP SERPL-MCNC: 6.4 MG/DL — HIGH (ref 0–0.4)
EOSINOPHIL # BLD AUTO: 0 K/UL — SIGNIFICANT CHANGE UP (ref 0–0.5)
EOSINOPHIL NFR BLD AUTO: 0.3 % — SIGNIFICANT CHANGE UP (ref 0–6)
GAS PNL BLDV: SIGNIFICANT CHANGE UP
GLUCOSE BLDC GLUCOMTR-MCNC: 132 MG/DL — HIGH (ref 70–99)
GLUCOSE SERPL-MCNC: 227 MG/DL — HIGH (ref 70–99)
HCT VFR BLD CALC: 48.2 % — SIGNIFICANT CHANGE UP (ref 39–50)
HGB BLD-MCNC: 15.8 G/DL — SIGNIFICANT CHANGE UP (ref 13–17)
INR BLD: 1.45 RATIO — HIGH (ref 0.88–1.16)
LYMPHOCYTES # BLD AUTO: 2.4 K/UL — SIGNIFICANT CHANGE UP (ref 1–3.3)
LYMPHOCYTES # BLD AUTO: 21.1 % — SIGNIFICANT CHANGE UP (ref 13–44)
MCHC RBC-ENTMCNC: 27.9 PG — SIGNIFICANT CHANGE UP (ref 27–34)
MCHC RBC-ENTMCNC: 32.8 GM/DL — SIGNIFICANT CHANGE UP (ref 32–36)
MCV RBC AUTO: 85.1 FL — SIGNIFICANT CHANGE UP (ref 80–100)
MONOCYTES # BLD AUTO: 0.9 K/UL — SIGNIFICANT CHANGE UP (ref 0–0.9)
MONOCYTES NFR BLD AUTO: 8.1 % — SIGNIFICANT CHANGE UP (ref 2–14)
NEUTROPHILS # BLD AUTO: 8 K/UL — HIGH (ref 1.8–7.4)
NEUTROPHILS NFR BLD AUTO: 70.2 % — SIGNIFICANT CHANGE UP (ref 43–77)
PLATELET # BLD AUTO: 205 K/UL — SIGNIFICANT CHANGE UP (ref 150–400)
POTASSIUM SERPL-MCNC: 4.2 MMOL/L — SIGNIFICANT CHANGE UP (ref 3.5–5.3)
POTASSIUM SERPL-SCNC: 4.2 MMOL/L — SIGNIFICANT CHANGE UP (ref 3.5–5.3)
PROT SERPL-MCNC: 8.3 G/DL — SIGNIFICANT CHANGE UP (ref 6–8.3)
PROTHROM AB SERPL-ACNC: 15.8 SEC — HIGH (ref 9.8–12.7)
RBC # BLD: 5.66 M/UL — SIGNIFICANT CHANGE UP (ref 4.2–5.8)
RBC # FLD: 13.9 % — SIGNIFICANT CHANGE UP (ref 10.3–14.5)
SODIUM SERPL-SCNC: 137 MMOL/L — SIGNIFICANT CHANGE UP (ref 135–145)
WBC # BLD: 11.4 K/UL — HIGH (ref 3.8–10.5)
WBC # FLD AUTO: 11.4 K/UL — HIGH (ref 3.8–10.5)

## 2018-05-01 PROCEDURE — 99223 1ST HOSP IP/OBS HIGH 75: CPT

## 2018-05-01 PROCEDURE — 93970 EXTREMITY STUDY: CPT | Mod: 26

## 2018-05-01 PROCEDURE — 99285 EMERGENCY DEPT VISIT HI MDM: CPT | Mod: GC

## 2018-05-01 RX ORDER — ACETAMINOPHEN 500 MG
650 TABLET ORAL EVERY 6 HOURS
Qty: 0 | Refills: 0 | Status: DISCONTINUED | OUTPATIENT
Start: 2018-05-01 | End: 2018-05-03

## 2018-05-01 RX ORDER — ACETAMINOPHEN 500 MG
650 TABLET ORAL ONCE
Qty: 0 | Refills: 0 | Status: COMPLETED | OUTPATIENT
Start: 2018-05-01 | End: 2018-05-01

## 2018-05-01 RX ORDER — CEFAZOLIN SODIUM 1 G
1000 VIAL (EA) INJECTION ONCE
Qty: 0 | Refills: 0 | Status: COMPLETED | OUTPATIENT
Start: 2018-05-01 | End: 2018-05-01

## 2018-05-01 RX ADMIN — Medication 100 MILLIGRAM(S): at 21:01

## 2018-05-01 RX ADMIN — Medication 650 MILLIGRAM(S): at 18:27

## 2018-05-01 NOTE — ED PROVIDER NOTE - PSH
S/P carotid endarterectomy  left  Status post angioplasty with stent  LULU x 3 2/7/2014 S/P CABG (coronary artery bypass graft)    S/P carotid endarterectomy  left  Status post angioplasty with stent  LULU x 3 2/7/2014

## 2018-05-01 NOTE — H&P ADULT - NSHPLABSRESULTS_GEN_ALL_CORE
Labs personally reviewed:                          15.8   11.4  )-----------( 205      ( 01 May 2018 18:10 )             48.2     05-01    137  |  94<L>  |  66<H>  ----------------------------<  227<H>  4.2   |  30  |  2.31<H>    Ca    9.9      01 May 2018 18:10    TPro  8.3  /  Alb  3.9  /  TBili  0.5  /  DBili  x   /  AST  17  /  ALT  7<L>  /  AlkPhos  82  05-01        LIVER FUNCTIONS - ( 01 May 2018 18:10 )  Alb: 3.9 g/dL / Pro: 8.3 g/dL / ALK PHOS: 82 U/L / ALT: 7 U/L / AST: 17 U/L / GGT: x           PT/INR - ( 01 May 2018 18:10 )   PT: 15.8 sec;   INR: 1.45 ratio         PTT - ( 01 May 2018 18:10 )  PTT:53.8 sec    CAPILLARY BLOOD GLUCOSE      POCT Blood Glucose.: 132 mg/dL (01 May 2018 23:52)      Imaging:  venous duplex reviewed: negative for DVT  XR ankle ordered    EKG personally reviewed: atrial fibrillation

## 2018-05-01 NOTE — ED PROVIDER NOTE - ATTENDING CONTRIBUTION TO CARE
63 yom vasculopath (cad s/p cabg, mult prior strokes, pvd) on xarelto (pt does not know why), presents with redness to RLE/ foot. states started 3 days ago after driving to the [employer] and back, and also sitting at a poker table for 12 hrs with minimal ambulation. states right lower leg more red and swollen than usual. swelling concentrated around right medial mal. no specific calf tenderness. is compliant w all meds. no sob or cp. no fever or chills. is able to walk w his cane as usual. takes lasix nightly w/o changed dose recently.     ROS:   constitutional - no fever, no chills  eyes - no visual changes, no redness  eent - no sore throat, no nasal congestion  cvs - no chest pain, + leg swelling  resp - no shortness of breath, no cough  gi - no abdominal pain, no vomiting, no diarrhea  gu - no dysuria, no hematuria  msk - no acute back pain, no joint swelling  skin - no rashes, no jaundice  neuro - no headache, no focal weakness  psych - no acute mental health issue     Physical Exam:   constitutional - well appearing, awake and alert, oriented x3  head - no external evidence of trauma  cvs - rrr, no murmurs, 2+ peripheral edema, R>L  resp - breath sounds clear and equal bilat  gi - abdomen soft and nontender, no rigidity, guarding or rebound, bowel sounds present  msk - moving all extremities spontaneously  neuro - alert and oriented x3, no focal deficits, CNs 2-12 grossly intact  skin- no jaundice, warm and dry. bilat venous stasis changes to bilat lower legs.   R medial malleolus swelling with swelling extending to right foot and distal leg. is able to range right ankle without difficulty or pain.   psych - mood and affect wnl, no apparent risk to self or others     ? worsening pedal edema vs dvt (prolonged immob) vs cellulitis (has low grade fever) vs septic arthritis (less likely as able to range joint) vs gout. SILVERIO Schuler MD 63 yom vasculopath (cad s/p cabg, mult prior strokes, pvd) on xarelto (pt does not know why), presents with redness to RLE/ foot. states started 3 days ago after driving to the [employer] and back, and also sitting at a poker table for 12 hrs with minimal ambulation. states right lower leg more red and swollen than usual. swelling concentrated around right medial mal. no specific calf tenderness. is compliant w all meds. no sob or cp. no fever or chills. is able to walk w his cane as usual. takes lasix nightly w/o changed dose recently.     ROS:   constitutional - no fever, no chills  eyes - no visual changes, no redness  eent - no sore throat, no nasal congestion  cvs - no chest pain, + leg swelling  resp - no shortness of breath, no cough  gi - no abdominal pain, no vomiting, no diarrhea  gu - no dysuria, no hematuria  msk - no acute back pain, no joint swelling  skin - no rashes, no jaundice  neuro - no headache, no focal weakness  psych - no acute mental health issue     Physical Exam:   constitutional - well appearing, awake and alert, oriented x3  head - no external evidence of trauma  cvs - rrr, no murmurs, 2+ peripheral edema, R>L  resp - breath sounds clear and equal bilat  gi - abdomen soft and nontender, no rigidity, guarding or rebound, bowel sounds present  msk - moving all extremities spontaneously  neuro - alert and oriented x3, no focal deficits, CNs 2-12 grossly intact  skin- no jaundice, warm and dry. bilat venous stasis changes to bilat lower legs.   R medial malleolus swelling with swelling extending to right foot and distal leg. is able to range right ankle without difficulty or pain.   psych - mood and affect wnl, no apparent risk to self or others     ? worsening pedal edema vs dvt (prolonged immob) vs cellulitis (has low grade fever) vs septic arthritis (less likely as able to range joint) vs gout. uric acid somewhat elevated however pain to joint itself does not seem to be particularly painful to range. will admit for iv abx. area of erythema overlies medial mal - do not want to tap joint at this time due to overlying cellulitis. SILVERIO Schuler MD

## 2018-05-01 NOTE — H&P ADULT - ASSESSMENT
64 yo male with PMH Afib on pradaxa, PVD, DM with neuropathy, CKD, CAD s/p MI, CVA, here with right ankle pain and swelling for 3 days.

## 2018-05-01 NOTE — ED PROVIDER NOTE - CARE PLAN
Principal Discharge DX:	Cellulitis  Assessment and plan of treatment:	Awaiting blood cx, starting Ancef

## 2018-05-01 NOTE — ED ADULT NURSE NOTE - OBJECTIVE STATEMENT
64 yo male presents ambulatory with cane to the ED c.o right lower extremity swelling redness and warmth to the site. hx of atrial fibrillation (on Pradaxa, off rate control), PVD, DM with neuropathy, CKD, CAD s/p MI (s/p CABG). At baseline patient has pain in his lower extremities but this si different. On Friday patient was in the car for approximately 4 hours and then sat down playing cards for 10 hours. He has swelling at baseline but this appears worse for him. It is mostly confined to the ankle and lower leg, increased warmth on the right lower ankle compared to left. He denies SOB, CP, cough, palpitations, orthopnea, or FAYE. patient fully undressed, able to stand for a few seconds but states he is unable to put full weight on right lower extremity.

## 2018-05-01 NOTE — ED PROVIDER NOTE - PROGRESS NOTE DETAILS
DVT study negative. Full ROM makes unlikely septic arthritis. Most concerning for cellulitis, particularly in the setting of elevated temp and WBC with elevated ESR. Cx obtained, will start Ancef and admit to medicine.

## 2018-05-01 NOTE — H&P ADULT - PROBLEM SELECTOR PLAN 4
currently at baseline creatininine currently creatinine is better than baseline  will continue to monitor  avoid nephrotoxic agents

## 2018-05-01 NOTE — ED ADULT NURSE REASSESSMENT NOTE - NS ED NURSE REASSESS COMMENT FT1
Patient received from Hayden RAMOS, patient is in [place] at this time, needs 2nd set of blood cultures. Family has been updated.

## 2018-05-01 NOTE — H&P ADULT - NSHPPHYSICALEXAM_GEN_ALL_CORE
PHYSICAL EXAM:  Vital Signs Last 24 Hrs  T(C): 36.6 (05-02-18 @ 00:14)  T(F): 97.8 (05-02-18 @ 00:14), Max: 100.1 (05-01-18 @ 18:03)  HR: 82 (05-02-18 @ 00:14) (80 - 100)  BP: 115/61 (05-02-18 @ 00:14)  BP(mean): --  RR: 20 (05-02-18 @ 00:14) (16 - 20)  SpO2: 95% (05-02-18 @ 00:14) (95% - 98%)  Wt(kg): --    Constitutional: NAD, awake and alert  EYES: EOMI  ENT:  Normal Hearing, no tonsillar exudates   Neck: Soft and supple, No JVD  Lungs: Breath sounds are clear bilaterally, No wheezing, rales or rhonchi  Heart: S1 and S2, irregular rhythm, no Murmurs, gallops or rubs  Abdomen: Bowel Sounds present, soft, nontender, nondistended, no guarding, no rebound  Extremities: No cyanosis or clubbing; warm to touch  Vascular: 2+ peripheral pulses lower ex  Neurological: A/O x 3, no focal deficits  Musculoskeletal: 5/5 strength b/l upper and lower extremities; +right ankle swelling, +tenderness, +warmth  Skin: No rashes  Psych: no depression or anhedonia  HEME: no bruises, no nose bleeds

## 2018-05-01 NOTE — ED PROVIDER NOTE - OBJECTIVE STATEMENT
62 yo M with a PMH atrial fibrillation (on Pradaxa, off rate control), PVD, DM with neuropathy, CAD s/p MI (s/p CABG) and CVA in the past c/o RLE pain and swelling for 3 days. He states he normally has stabbing neuropathic pain in his lower extremities but this is different. He states his happened on the way back from a trip to [place] on Friday to Saturday where he was playing poker sedentarily for 10-12 hours (only getting up to go to the bathroom) and also driving 4 hours, although he has been sedentary for long periods of time . He notes intentional weight loss of 15 lbs in the last 3 months. 62 yo M with a PMH atrial fibrillation (on Pradaxa, off rate control), PVD, DM with neuropathy, CKD, CAD s/p MI (s/p CABG) and CVA in the past c/o RLE pain and swelling for 3 days. He states he normally has stabbing neuropathic pain in his lower extremities but this is different. He states his happened on the way back from a trip to [place] on Friday to Saturday where he was playing poker sedentarily for 10-12 hours (only getting up to go to the bathroom) and also driving 4 hours, although he has been sedentary for long periods of time before without problems. He notes chronic LE swelling and redness that improves on elevating his legs., but feels the swelling and pain in his R leg is different. It is mostly confined to the ankle and lower leg. He denies SOB, CP, cough, palpitations, orthopnea, or FAYE. He notes intentional weight loss of 15 lbs in the last 3 months.

## 2018-05-01 NOTE — ED PROVIDER NOTE - MEDICAL DECISION MAKING DETAILS
62 yo M with a PMH atrial fibrillation (on Pradaxa, off rate control), PVD, DM with neuropathy, CKD, CAD s/p MI (s/p CABG) and CVA in the past c/o RLE pain and swelling for 3 days. Patient is not having pain unless touched, has no cardiopulmonary symptoms concerning for PE or CHF exacerbation. In the setting of reent sedentary lifestyle, will r/o DVT. Patient has elevated non-febrile temp 99.5F PO, will obtain rectal temp and blood cx, r/o cellulitis, especially in the setting of diabetes. Unlikely gout, but in the setting of swelling more concentrated around ankle, will check ESR, CRP, and UA.

## 2018-05-01 NOTE — H&P ADULT - NSHPREVIEWOFSYSTEMS_GEN_ALL_CORE
CONSTITUTIONAL: No weakness, fevers or chills  EYES/ENT: No visual changes;  No dysphagia  NECK: No pain or stiffness  RESPIRATORY: No cough, wheezing, hemoptysis; No shortness of breath  CARDIOVASCULAR: No chest pain or palpitations;  +lower extremity edema (chronic)  EXTREMITIES: no cyanosis, clubbing; +right ankle swellling, tenderness, edema, warmth  MUSCULOSKELETAL: no joint pain, swelling  GASTROINTESTINAL: No abdominal or epigastric pain. No nausea, vomiting, or hematemesis; + constipation. No melena or hematochezia.  BACK: No back pain  GENITOURINARY: No dysuria, frequency or hematuria  NEUROLOGICAL: + paresthesia  SKIN: No itching, burning, rashes, or lesions   PSYCH: no agitation  All other review of systems is negative unless indicated above.

## 2018-05-01 NOTE — H&P ADULT - PROBLEM SELECTOR PLAN 1
Patient with right ankle swelling, tenderness, warmth likely due to acute gout (elevated uric acid level)  low suspicion for septic arthritis  Currently most ROM intact, but extreme sensitivity to pain  will c/w pain medication  consider ortho consult for possible arthrocentesis for diagnosis  for now will c/w IV abx  start prednisone 30mg and then every 24h given CKD  f/u blood culture  check procalcitonin level  check XR ankle  monitor uric acid level while on lasix; may eventually need to be on allopurinol

## 2018-05-02 ENCOUNTER — TRANSCRIPTION ENCOUNTER (OUTPATIENT)
Age: 63
End: 2018-05-02

## 2018-05-02 LAB
ALBUMIN SERPL ELPH-MCNC: 3.4 G/DL — SIGNIFICANT CHANGE UP (ref 3.3–5)
ALP SERPL-CCNC: 71 U/L — SIGNIFICANT CHANGE UP (ref 40–120)
ALT FLD-CCNC: 7 U/L — LOW (ref 10–45)
ANION GAP SERPL CALC-SCNC: 13 MMOL/L — SIGNIFICANT CHANGE UP (ref 5–17)
AST SERPL-CCNC: 15 U/L — SIGNIFICANT CHANGE UP (ref 10–40)
BASOPHILS # BLD AUTO: 0.02 K/UL — SIGNIFICANT CHANGE UP (ref 0–0.2)
BASOPHILS NFR BLD AUTO: 0.2 % — SIGNIFICANT CHANGE UP (ref 0–2)
BILIRUB SERPL-MCNC: 0.5 MG/DL — SIGNIFICANT CHANGE UP (ref 0.2–1.2)
BUN SERPL-MCNC: 57 MG/DL — HIGH (ref 7–23)
CALCIUM SERPL-MCNC: 9.5 MG/DL — SIGNIFICANT CHANGE UP (ref 8.4–10.5)
CHLORIDE SERPL-SCNC: 97 MMOL/L — SIGNIFICANT CHANGE UP (ref 96–108)
CO2 SERPL-SCNC: 29 MMOL/L — SIGNIFICANT CHANGE UP (ref 22–31)
CREAT SERPL-MCNC: 1.96 MG/DL — HIGH (ref 0.5–1.3)
EOSINOPHIL # BLD AUTO: 0.09 K/UL — SIGNIFICANT CHANGE UP (ref 0–0.5)
EOSINOPHIL NFR BLD AUTO: 0.8 % — SIGNIFICANT CHANGE UP (ref 0–6)
GLUCOSE BLDC GLUCOMTR-MCNC: 204 MG/DL — HIGH (ref 70–99)
GLUCOSE BLDC GLUCOMTR-MCNC: 238 MG/DL — HIGH (ref 70–99)
GLUCOSE BLDC GLUCOMTR-MCNC: 256 MG/DL — HIGH (ref 70–99)
GLUCOSE BLDC GLUCOMTR-MCNC: 263 MG/DL — HIGH (ref 70–99)
GLUCOSE BLDC GLUCOMTR-MCNC: 361 MG/DL — HIGH (ref 70–99)
GLUCOSE SERPL-MCNC: 140 MG/DL — HIGH (ref 70–99)
HCT VFR BLD CALC: 47.6 % — SIGNIFICANT CHANGE UP (ref 39–50)
HGB BLD-MCNC: 14.7 G/DL — SIGNIFICANT CHANGE UP (ref 13–17)
IMM GRANULOCYTES NFR BLD AUTO: 0.3 % — SIGNIFICANT CHANGE UP (ref 0–1.5)
LYMPHOCYTES # BLD AUTO: 1.49 K/UL — SIGNIFICANT CHANGE UP (ref 1–3.3)
LYMPHOCYTES # BLD AUTO: 13.8 % — SIGNIFICANT CHANGE UP (ref 13–44)
MAGNESIUM SERPL-MCNC: 2.2 MG/DL — SIGNIFICANT CHANGE UP (ref 1.6–2.6)
MCHC RBC-ENTMCNC: 26.7 PG — LOW (ref 27–34)
MCHC RBC-ENTMCNC: 30.9 GM/DL — LOW (ref 32–36)
MCV RBC AUTO: 86.5 FL — SIGNIFICANT CHANGE UP (ref 80–100)
MONOCYTES # BLD AUTO: 0.57 K/UL — SIGNIFICANT CHANGE UP (ref 0–0.9)
MONOCYTES NFR BLD AUTO: 5.3 % — SIGNIFICANT CHANGE UP (ref 2–14)
NEUTROPHILS # BLD AUTO: 8.61 K/UL — HIGH (ref 1.8–7.4)
NEUTROPHILS NFR BLD AUTO: 79.6 % — HIGH (ref 43–77)
PHOSPHATE SERPL-MCNC: 3.8 MG/DL — SIGNIFICANT CHANGE UP (ref 2.5–4.5)
PLATELET # BLD AUTO: 177 K/UL — SIGNIFICANT CHANGE UP (ref 150–400)
POTASSIUM SERPL-MCNC: 3.5 MMOL/L — SIGNIFICANT CHANGE UP (ref 3.5–5.3)
POTASSIUM SERPL-SCNC: 3.5 MMOL/L — SIGNIFICANT CHANGE UP (ref 3.5–5.3)
PROCALCITONIN SERPL-MCNC: <0.05 NG/ML — SIGNIFICANT CHANGE UP (ref 0–0.04)
PROT SERPL-MCNC: 7.3 G/DL — SIGNIFICANT CHANGE UP (ref 6–8.3)
RBC # BLD: 5.5 M/UL — SIGNIFICANT CHANGE UP (ref 4.2–5.8)
RBC # FLD: 14.9 % — HIGH (ref 10.3–14.5)
SODIUM SERPL-SCNC: 139 MMOL/L — SIGNIFICANT CHANGE UP (ref 135–145)
WBC # BLD: 10.81 K/UL — HIGH (ref 3.8–10.5)
WBC # FLD AUTO: 10.81 K/UL — HIGH (ref 3.8–10.5)

## 2018-05-02 PROCEDURE — 73610 X-RAY EXAM OF ANKLE: CPT | Mod: 26,RT

## 2018-05-02 RX ORDER — ASPIRIN/CALCIUM CARB/MAGNESIUM 324 MG
81 TABLET ORAL DAILY
Qty: 0 | Refills: 0 | Status: DISCONTINUED | OUTPATIENT
Start: 2018-05-02 | End: 2018-05-03

## 2018-05-02 RX ORDER — INSULIN LISPRO 100/ML
VIAL (ML) SUBCUTANEOUS
Qty: 0 | Refills: 0 | Status: DISCONTINUED | OUTPATIENT
Start: 2018-05-02 | End: 2018-05-03

## 2018-05-02 RX ORDER — GLUCAGON INJECTION, SOLUTION 0.5 MG/.1ML
1 INJECTION, SOLUTION SUBCUTANEOUS ONCE
Qty: 0 | Refills: 0 | Status: DISCONTINUED | OUTPATIENT
Start: 2018-05-02 | End: 2018-05-03

## 2018-05-02 RX ORDER — DEXTROSE 50 % IN WATER 50 %
25 SYRINGE (ML) INTRAVENOUS ONCE
Qty: 0 | Refills: 0 | Status: DISCONTINUED | OUTPATIENT
Start: 2018-05-02 | End: 2018-05-03

## 2018-05-02 RX ORDER — CEFAZOLIN SODIUM 1 G
1000 VIAL (EA) INJECTION EVERY 8 HOURS
Qty: 0 | Refills: 0 | Status: DISCONTINUED | OUTPATIENT
Start: 2018-05-02 | End: 2018-05-03

## 2018-05-02 RX ORDER — METOPROLOL TARTRATE 50 MG
100 TABLET ORAL DAILY
Qty: 0 | Refills: 0 | Status: DISCONTINUED | OUTPATIENT
Start: 2018-05-02 | End: 2018-05-03

## 2018-05-02 RX ORDER — DEXTROSE 50 % IN WATER 50 %
12.5 SYRINGE (ML) INTRAVENOUS ONCE
Qty: 0 | Refills: 0 | Status: DISCONTINUED | OUTPATIENT
Start: 2018-05-02 | End: 2018-05-03

## 2018-05-02 RX ORDER — FUROSEMIDE 40 MG
80 TABLET ORAL DAILY
Qty: 0 | Refills: 0 | Status: DISCONTINUED | OUTPATIENT
Start: 2018-05-02 | End: 2018-05-03

## 2018-05-02 RX ORDER — DABIGATRAN ETEXILATE MESYLATE 150 MG/1
150 CAPSULE ORAL ONCE
Qty: 0 | Refills: 0 | Status: COMPLETED | OUTPATIENT
Start: 2018-05-02 | End: 2018-05-02

## 2018-05-02 RX ORDER — DEXTROSE 50 % IN WATER 50 %
1 SYRINGE (ML) INTRAVENOUS ONCE
Qty: 0 | Refills: 0 | Status: DISCONTINUED | OUTPATIENT
Start: 2018-05-02 | End: 2018-05-03

## 2018-05-02 RX ORDER — INSULIN LISPRO 100/ML
VIAL (ML) SUBCUTANEOUS AT BEDTIME
Qty: 0 | Refills: 0 | Status: DISCONTINUED | OUTPATIENT
Start: 2018-05-02 | End: 2018-05-03

## 2018-05-02 RX ORDER — SODIUM CHLORIDE 9 MG/ML
1000 INJECTION, SOLUTION INTRAVENOUS
Qty: 0 | Refills: 0 | Status: DISCONTINUED | OUTPATIENT
Start: 2018-05-02 | End: 2018-05-03

## 2018-05-02 RX ORDER — DABIGATRAN ETEXILATE MESYLATE 150 MG/1
1 CAPSULE ORAL
Qty: 0 | Refills: 0 | COMMUNITY

## 2018-05-02 RX ADMIN — Medication 81 MILLIGRAM(S): at 12:25

## 2018-05-02 RX ADMIN — Medication 100 MILLIGRAM(S): at 23:03

## 2018-05-02 RX ADMIN — Medication 4: at 08:42

## 2018-05-02 RX ADMIN — Medication 6: at 17:36

## 2018-05-02 RX ADMIN — Medication 100 MILLIGRAM(S): at 03:06

## 2018-05-02 RX ADMIN — DABIGATRAN ETEXILATE MESYLATE 150 MILLIGRAM(S): 150 CAPSULE ORAL at 03:03

## 2018-05-02 RX ADMIN — Medication 100 MILLIGRAM(S): at 05:44

## 2018-05-02 RX ADMIN — Medication 2: at 21:46

## 2018-05-02 RX ADMIN — Medication 30 MILLIGRAM(S): at 03:03

## 2018-05-02 RX ADMIN — Medication 100 MILLIGRAM(S): at 16:16

## 2018-05-02 RX ADMIN — Medication 100 MILLIGRAM(S): at 13:56

## 2018-05-02 RX ADMIN — Medication 100 MILLIGRAM(S): at 05:45

## 2018-05-02 RX ADMIN — Medication 10: at 12:26

## 2018-05-02 RX ADMIN — Medication 80 MILLIGRAM(S): at 05:45

## 2018-05-02 NOTE — DISCHARGE NOTE ADULT - MEDICATION SUMMARY - MEDICATIONS TO TAKE
I will START or STAY ON the medications listed below when I get home from the hospital:    aspirin 81 mg oral delayed release tablet  -- 1 tab(s) by mouth once a day  -- Indication: For Protect from clot formation    acetaminophen 325 mg oral tablet  -- 2 tab(s) by mouth every 6 hours, As needed, For Temp greater than 38 C (100.4 F)  -- Indication: For fever and or pain    dabigatran 75 mg oral capsule  -- 150 milligram(s) by mouth 2 times a day  -- Indication: For Atrial fibrillation, CAD s/p MI    Lyrica 100 mg oral capsule  -- 1 cap(s) by mouth 2 times a day  -- Indication: For neuropathy    glimepiride 4 mg oral tablet  -- 1 tab(s) by mouth once a day  -- Indication: For Type 2 Diabetes mellitus    metoprolol succinate 100 mg oral tablet, extended release  -- 1 tab(s) by mouth once a day  -- Indication: For Atrial fibrillation, high blood pressure    Keflex 500 mg oral capsule  -- 1 cap(s) by mouth every 8 hours   -- Finish all this medication unless otherwise directed by prescriber.    -- Indication: For Cellulitis    Lasix 80 mg oral tablet  -- 1 tab(s) by mouth once a day  -- Indication: For fluid overload    docusate sodium 100 mg oral capsule  -- 1 cap(s) by mouth 3 times a day  -- Indication: For Constipation    senna oral tablet  -- 2 tab(s) by mouth once a day (at bedtime)  -- Indication: For Constipation I will START or STAY ON the medications listed below when I get home from the hospital:    aspirin 81 mg oral delayed release tablet  -- 1 tab(s) by mouth once a day  -- Indication: For Protect from clot formation    acetaminophen 325 mg oral tablet  -- 2 tab(s) by mouth every 6 hours, As needed, For Temp greater than 38 C (100.4 F)  -- Indication: For pain and or fever    dabigatran 75 mg oral capsule  -- 150 milligram(s) by mouth 2 times a day  -- Indication: For Atrial fibrillation, CAD s/p MI    Lyrica 100 mg oral capsule  -- 1 cap(s) by mouth 2 times a day  -- Indication: For neuropathy    glimepiride 4 mg oral tablet  -- 1 tab(s) by mouth once a day  -- Indication: For Type 2 Diabetes mellitus     metoprolol succinate 100 mg oral tablet, extended release  -- 1 tab(s) by mouth once a day  -- Indication: For Atrial fibrillation, high blood pressure    Keflex 500 mg oral capsule  -- 1 cap(s) by mouth every 8 hours   -- Finish all this medication unless otherwise directed by prescriber.    -- Indication: For Cellulitis    Lasix 80 mg oral tablet  -- 1 tab(s) by mouth once a day  -- Indication: For Fluid overload    docusate sodium 100 mg oral capsule  -- 1 cap(s) by mouth 3 times a day  -- Indication: For Constipation    senna oral tablet  -- 2 tab(s) by mouth once a day (at bedtime)  -- Indication: For Constipation

## 2018-05-02 NOTE — CONSULT NOTE ADULT - ATTENDING COMMENTS
Patient seen and examined, agree with the above assessment and plan by JOYCE Mc.  Pt well known to our practice, PMH of chronic diastolic CHF, CAD s/p PCI, afib, CKD, obesity p/w RLE erythema and pain due to likely cellulitis  Pt offers no cardiac complaints, appears well compensated from a cardiac perspective  Recommend continuing current cardiac meds  IV abx per med

## 2018-05-02 NOTE — DISCHARGE NOTE ADULT - CARE PROVIDER_API CALL
Yayo Gallegos), Internal Medicine; Nephrology  [address]  [address]  [place] [address]  Phone: [phone]  Fax: [phone]    Colby Mazariegos), Cardiovascular Disease; Internal Medicine; Interventional Cardiology; Nuclear Cardiology  [address] [place] [address]  [place] [address]  Phone: [phone]  Fax: [phone]

## 2018-05-02 NOTE — CONSULT NOTE ADULT - ASSESSMENT
Pt with DM, CAD, MI, TIA, AF presents with elevated uric acid, and localized ankle pain without any skin breakdown.  Pt has dramatically improved with the initiation of both steroids and ancef.  I favor crystal disease more than infection but it is not possible to know for sure and it is probably too late to made a definitive diagnosis in view of the improvement     Rapid taper of steroids over next 48 hr .  Continue ancef for now but anticipate a change to po keflex in the next few days   ID to cover next 4 days

## 2018-05-02 NOTE — DISCHARGE NOTE ADULT - PATIENT PORTAL LINK FT
You can access the [facility], offered by [facility], by registering with the following website: [email or web address]

## 2018-05-02 NOTE — DISCHARGE NOTE ADULT - CARE PLAN
Principal Discharge DX:	Cellulitis  Goal:	Resolution of infection  Assessment and plan of treatment:	Take all of your antibiotics as ordered.  Call your Health Care Provider within two days of arriving home to make a follow up appointment within one week.  If the affected cellulitic area increases in redness, warmth, pain or swelling call your Health Care Provider.  If you develop fever, chills, and/or malaise, call your Health Care Provider.  Secondary Diagnosis:	Shortness of breath  Goal:	Resolved  Assessment and plan of treatment:	Continue with medication as prescribed.   Follow-up with your primary care physician within 1 week. Call for appointment.  Secondary Diagnosis:	Atrial fibrillation  Assessment and plan of treatment:	Atrial fibrillation is the most common heart rhythm problem & has the risk of stroke & heart attack  It helps if you control your blood pressure, not drink more than 1-2 alcohol drinks per day, cut down on caffeine, getting treatment for over active thyroid gland, & getting exercise  Call your doctor if you feel your heart racing or beating unusually, chest tightness or pain, lightheaded, faint, shortness of breath especially with exercise  It is important to take your heart medication as prescribed  You may be on anticoagulation which is very important to take as directed - you may need blood work to monitor drug levels  Secondary Diagnosis:	Status post angioplasty with stent  Assessment and plan of treatment:	Continue with Pradaxa as prescribed by your doctor.  Follow-up with your primary care physician and cardiologist  within 1 week. Call for appointment.  Secondary Diagnosis:	DM type 2 (diabetes mellitus, type 2)  Secondary Diagnosis:	HTN (hypertension), benign  Secondary Diagnosis:	HLD (hyperlipidemia) Principal Discharge DX:	Cellulitis  Goal:	Resolution of infection  Assessment and plan of treatment:	Take all of your antibiotics as ordered.  Call your Health Care Provider within two days of arriving home to make a follow up appointment within one week.  If the affected cellulitic area increases in redness, warmth, pain or swelling call your Health Care Provider.  If you develop fever, chills, and/or malaise, call your Health Care Provider.  Secondary Diagnosis:	Shortness of breath  Goal:	Resolved  Assessment and plan of treatment:	Continue with medication as prescribed.   Follow-up with your primary care physician within 1 week. Call for appointment.  Secondary Diagnosis:	Atrial fibrillation  Assessment and plan of treatment:	Atrial fibrillation is the most common heart rhythm problem & has the risk of stroke & heart attack  It helps if you control your blood pressure, not drink more than 1-2 alcohol drinks per day, cut down on caffeine, getting treatment for over active thyroid gland, & getting exercise  Call your doctor if you feel your heart racing or beating unusually, chest tightness or pain, lightheaded, faint, shortness of breath especially with exercise  It is important to take your heart medication as prescribed  You may be on anticoagulation which is very important to take as directed - you may need blood work to monitor drug levels  Secondary Diagnosis:	Status post angioplasty with stent  Assessment and plan of treatment:	Continue with Pradaxa as prescribed by your doctor.  Follow-up with your primary care physician and cardiologist  within 1 week. Call for appointment.  Secondary Diagnosis:	DM type 2 (diabetes mellitus, type 2)  Assessment and plan of treatment:	HgA1C this admission 9.9  Make sure you get your HgA1c checked every three months.  If you take oral diabetes medications, check your blood glucose two times a day.  If you take insulin, check your blood glucose before meals and at bedtime.  It's important not to skip any meals.  Keep a log of your blood glucose results and always take it with you to your doctor appointments.  Keep a list of your current medications including injectables and over the counter medications and bring this medication list with you to all your doctor appointments.  If you have not seen your ophthalmologist this year call for appointment.  Check your feet daily for redness, sores, or openings. Do not self treat. If no improvement in two days call your primary care physician for an appointment.  Low blood sugar (hypoglycemia) is a blood sugar below 70mg/dl. Check your blood sugar if you feel signs/symptoms of hypoglycemia. If your blood sugar is below 70 take 15 grams of carbohydrates (ex 4 oz of apple juice, 3-4 glucose tablets, or 4-6 oz of regular soda) wait 15 minutes and repeat blood sugar to make sure it comes up above 70.  If your blood sugar is above 70 and you are due for a meal, have a meal.  If you are not due for a meal have a snack.  This snack helps keeps your blood sugar at a safe range.  Secondary Diagnosis:	HTN (hypertension), benign  Assessment and plan of treatment:	Low salt diet  Activity as tolerated.  Take all medication as prescribed.  Follow up with your medical doctor for routine blood pressure monitoring at your next visit.  Notify your doctor if you have any of the following symptoms:   Dizziness, Lightheadedness, Blurry vision, Headache, Chest pain, Shortness of breath  Secondary Diagnosis:	HLD (hyperlipidemia)  Assessment and plan of treatment:	Continue with your cholesterol medications. Eat a heart healthy diet that is low in saturated fats and salt, and includes whole grains, fruits, vegetables and lean protein; exercise regularly (consult with your physician or cardiologist first); maintain a heart healthy weight; if you smoke - quit (A resource to help you stop smoking is the [facility] – phone number [phone].). Continue to follow with your primary physician or cardiologist.

## 2018-05-02 NOTE — CONSULT NOTE ADULT - ASSESSMENT
63 year old male with PMHx  AFIB, HTN, HLD, PVD, CVA, CAD, NSTEMI s/p PCI to mid LAD, prox LCx, distal LCx, Diastolic Heart failure, DM, severe left internal carotid disease s/p CEA presenting with right ankle pain/ swelling, ?cellulitis/gout.     1. Right ankle pain.  pt. with swelling, redness and warmth localized to right ankle  uric acid levels elevated, on prednisone   wbc elevated, ?cellulitis, continue with abx  blood cultures pending   us negative for DVT  pending ankle xray  med f/u   ortho eval     2. CAD s/p PCI  cv stable no chest pain or sob.  no evidence of acute ischemia/ACS    continue ASA    3. Diastolic Heart Failure (hx)  stable   no s/s of fluid overload on exam   continue lasix 80mg daily, bb   no acie/arb secondary to CKD     4. AFIB  stable, rate controlled  continue with BB  CHADS 5 - continue pradaxa     5. HTN  bp stable   continue current medication regimen    6. CKD  stable  renal f/u     DVT ppx

## 2018-05-02 NOTE — PROGRESS NOTE ADULT - ASSESSMENT
64 yo male with PMH Afib on pradaxa, PVD, DM with neuropathy, CKD, CAD s/p MI, CVA, here with right ankle pain and swelling for 3 days.       Problem/Plan - 1:  ·  Problem: Right ankle pain.  Plan: Patient with right ankle swelling, tenderness, warmth likely due to acute gout (elevated uric acid level)  currently improving, c/w abx  refusing any more steroids  PT eval  pain control PRN       Problem/Plan - 2:  ·  Problem: CAD (coronary artery disease).  Plan: c/w home med, aspirin, metoprolol.      Problem/Plan - 3:  ·  Problem: Atrial fibrillation.  Plan: c/w pradaxa and metoprolol.      Problem/Plan - 4:  ·  Problem: CKD (chronic kidney disease).  Plan: currently creatinine is better than baseline  will continue to monitor  avoid nephrotoxic agents.     Problem/Plan - 5:  ·  Problem: DM type 2 (diabetes mellitus, type 2).  Plan: check A1c  sliding scale.      Problem/Plan - 6:  Problem: Prophylactic measure. Plan: on pradaxa.

## 2018-05-02 NOTE — CONSULT NOTE ADULT - SUBJECTIVE AND OBJECTIVE BOX
Patient is a 63y old  Male who presents with a chief complaint of sob (02 May 2018 10:25)    HPI:  62 yo male with PMH Afib on pradaxa, PVD, DM with neuropathy, CKD, CAD s/p MI, CVA, here with right ankle pain and swelling for 3 days.  Patient states that he always has had swelling of b/l legs that is worse during day and resolves at night, but this is different than usual.  On Friday, he drove to [place] and next day he drove to [facility] where he was playing poker for 12 hours sitting down and then drove back to [place] same day.  Patient states that since Saturday, he started having severe right ankle pain to the point that any movement made pain worse.  He could not ambulate or bear any weight.  He also thinks the ankle is more warm than normal.  Few weeks ago he had right elbow joint swelling and extreme tenderness with warmth.  Symptoms resolved after 5 days.  Otherwise, patient denies any trauma to the ankle and denies itchiness or scratching.  He denies any fever, chills, nausea, vomiting, SOB. (01 May 2018 23:44)      PAST MEDICAL & SURGICAL HISTORY:  CAD (coronary artery disease)  MI (myocardial infarction): circa 2014  Atrial fibrillation  TIA (transient ischemic attack)  DM type 2 (diabetes mellitus, type 2)  HLD (hyperlipidemia)  HTN (hypertension), benign  S/P carotid endarterectomy: left  Status post angioplasty with stent: LULU x 3 2/7/2014      Social history:    FAMILY HISTORY:  Family history of heart disease (Father)    REVIEW OF SYSTEMS  General:	Denies any malaise fatigue or chills. Fevers absent    Skin:No rash  	  Ophthalmologic:Denies any visual complaints,discharge redness or photophobia  	  ENMT:No nasal discharge,headache,sinus congestion or throat pain.No dental complaints    Respiratory and Thorax:No cough,sputum or chest pain.Denies shortness of breath  	  Cardiovascular:	No chest pain,palpitaions or dizziness    Gastrointestinal:	NO nausea,abdominal pain or diarrhea.    Genitourinary:	No dysuria,frequency. No flank pain    Musculoskeletal:	 ankle pain and sweliong     Neurological:No confusion,diziness.No extremity weakness.No bladder or bowel incontinence	    Psychiatric:No delusions or hallucinations	    Hematology/Lymphatics:	No LN swelling.No gum bleeding     Endocrine:	No recent weight gain or loss.No abnormal heat/cold intolerance    Allergic/Immunologic:	No hives or rash   Allergies    No Known Allergies    Intolerances        Antimicrobials:    ceFAZolin   IVPB 1000 milliGRAM(s) IV Intermittent every 8 hours        Vital Signs Last 24 Hrs  T(C): 36.7 (02 May 2018 04:00), Max: 37.8 (01 May 2018 18:03)  T(F): 98 (02 May 2018 04:00), Max: 100.1 (01 May 2018 18:03)  HR: 78 (02 May 2018 05:59) (76 - 100)  BP: 130/78 (02 May 2018 05:59) (115/61 - 154/83)  BP(mean): --  RR: 18 (02 May 2018 05:59) (16 - 20)  SpO2: 96% (02 May 2018 04:00) (95% - 98%)    PHYSICAL EXAM:Pleasant patient in no acute distress.      Constitutional:Comfortable.Awake and alert  No cachexia     Eyes:PERRL EOMI.NO discharge or conjunctival injection    ENMT:No sinus tenderness.No thrush.No pharyngeal exudate or erythema.Fair dental hygiene    Neck:Supple,No LN,no JVD         Genitourinary:No CVA tendereness          Extremities:N bilateral venous insuffiencey changes  no ulcers or open wds  tender medial aspect of right ankle   slight swelling     Vascular:peripheral pulses felt    Neurological:AAO X 3,No grossly focal deficits    Skin:No rash     Lymph Nodes:No palpable LNs    Musculoskeletal:No joint swelling or LOM                                 14.7   10.81 )-----------( 177      ( 02 May 2018 07:38 )             47.6         05-02    139  |  97  |  57<H>  ----------------------------<  140<H>  3.5   |  29  |  1.96<H>    Ca    9.5      02 May 2018 06:04  Phos  3.8     05-02  Mg     2.2     05-02    TPro  7.3  /  Alb  3.4  /  TBili  0.5  /  DBili  x   /  AST  15  /  ALT  7<L>  /  AlkPhos  71  05-02      RECENT CULTURES:      MICROBIOLOGY:          Radiology:      Assessment:        Recommendations and Plan:    Pager [phone]  After 5 pm/weekends or if no response :[phone]

## 2018-05-02 NOTE — DISCHARGE NOTE ADULT - HOSPITAL COURSE
63 year old male with PMHx  AFIB, HTN, HLD, PVD, CVA, CAD, NSTEMI s/p PCI to mid LAD, prox LCx, distal LCx, Diastolic Heart failure, DM, severe left internal carotid disease s/p CEA presenting with right ankle pain/ swelling, likely cellulitis. Pt improved after course of IV antibiotic which will switch to Keflex 500mg three times daily for 5 days per ID . Blood cultures negative ,US  negative for DVT. No skilled needs per PT eval.  CAD s/p PCI: cv stable no chest pain or sob, no evidence of acute ischemia/ACS  , continue ASA. Diastolic Heart Failure (hx), stable ,continue lasix 80mg daily, BB, no ACEI/ARB secondary to CKD . AFIB,stable, rate controlled,continue with BB, CHADS 5 : continue pradaxa . HTN:BP  stable .Continue current medication regimen. CKD stable, renal f/u       Pt is stable for discharge today. He will follow-up with PMD/cardiologist.

## 2018-05-02 NOTE — CONSULT NOTE ADULT - SUBJECTIVE AND OBJECTIVE BOX
CARDIOLOGY CONSULT - Dr. Mazariegos     CHIEF COMPLAINT: Right ankle pain.     HPI:  62 yo male with PMHx below presenting with right ankle pain and swelling for 3 days. He states the pain was so severe that he was unable to bear weight. He also notes that his ankle feels "warm". This patient is well known to our practice, cardiac history includes, AFIB, HTN, HLD, PVD, CVA, CAD, NSTEMI s/p PCI to mid LAD, prox LCx, distal LCx, Diastolic Heart failure, DM, severe left internal carotid disease s/p CEA. At present patient denies cp/sob/peraza, denies palpitations, dizziness, syncope, orthopnea, lower extremity edema. Recent echo in office from february revealing minimal aortic regurg, mild mitral regurg, moderate LAE, EF 60%, normal right ventricular systolic function.       PAST MEDICAL & SURGICAL HISTORY:  CAD (coronary artery disease)  MI (myocardial infarction): circa   Atrial fibrillation  TIA (transient ischemic attack)  DM type 2 (diabetes mellitus, type 2)  HLD (hyperlipidemia)  HTN (hypertension), benign  S/P carotid endarterectomy: left  Status post angioplasty with stent: LULU x 3 2014          PREVIOUS DIAGNOSTIC TESTING:    [ ] Echocardiogram:   [ ]  Catheterization:   < from: Cardiac Cath Lab (14 @ 08:52) >  INDICATIONS: Angina/MI: myocardial infarction without ST elevation  (NSTEMI), CCS class III, with onset 2-7 days prior to admission.  HISTORY: The patient has hypertension, insulin-controlled diabetes,  medication-treated dyslipidemia, morbid obesity, and a family history of  coronary artery disease. PRIOR CARDIOVASCULAR PROCEDURES: Stent of the mid  LAD.  PROCEDURE:  --  Intervention on distal circumflex: drug-eluting stent.  --  Intervention on proximal circumflex: drug-eluting stent.  TECHNIQUE: The risks and alternatives of the procedures and conscious  sedation were explained to the patient and informed consent was obtained.  Cardiac catheterization performed electively.  Local anesthetic given. Right radial artery access. RADIATION EXPOSURE: 16  min. A drug-eluting stent was performed on the 80 % lesion in the distal  circumflex. Following intervention there was a 1 % residual stenosis.  Vessel setup was performed. A 6FR EBU 3.5 LAUNCHER guiding catheter was  used to intubate the vessel. Vessel setup was performed. A [email or web address] UNIVERSAL  190CM wire was used to cross the lesion. Balloon angioplasty was  performed, using a 2.5 X 20 MAVERICK RX balloon, with 3 inflations and a  maximum inflation pressure of 12 tom. A 2.50 X 30 RESOLUTE drug-eluting  stent was placed across the lesion and deployed at a maximum inflation  pressure of 16 tom. Balloon angioplasty was performed, with 1 inflations  and a maximum inflation pressure of 16 tom. A drug-eluting stent was  performed on the 80 % lesion in the proximal circumflex. Following  intervention there was a 1 % residual stenosis. Vessel setup was  performed. A 6FR EBU 3.5 LAUNCHER guiding catheter was used to intubate  the vessel. Vessel setup was performed. A [employer] UNIVERSAL 190CM wire was  used to cross the lesion. A 3.50 X 15 RESOLUTE drug-eluting stent was  placed across the lesion and deployed at a maximum inflation pressure of  18 tom. Balloon angioplasty was performed, using a 4.0 X 12 NC SPRINTER  balloon, with 2 inflations and a maximum inflation pressure of 22 tom.  CONTRAST GIVEN: Omnipaque 189 ml.  MEDICATIONS GIVEN: Fentanyl, 25 mcg, IV. Midazolam, 2 mg, IV. Midazolam, 1  mg, IV. Verapamil (Isoptin, Calan, Covera), 2 mg, IA. Nitroglycerin, 100  mcg, intracoronary. Heparin, 3000 units, IA. Heparin, 5000 units, IV.  VENTRICLES: No left ventriculogram was performed.  CORONARY VESSELS:  CX:   --  Proximal circumflex: There was a 80 % stenosis.  --  Distal circumflex: There was a 80 % stenosis.  COMPLICATIONS: There were no complications.  DIAGNOSTIC RECOMMENDATIONS: Severe LAD and LCx disease. PCI of proximal and  distal LCx with LULU in the setting of angina/NSTEMI. Patients remains in  sinus rhythm. Continue ASA 81 and plavix daily. Start Pradaxa 150mg BID  for AF CVA ppx after band pull. Will consider stopping one antiplatelet in  4-6 weeks in light of his A/C need for atrial fibrillation. Evaluation in  progress for internal carotid artery intervention. Would wait 4-6 weeks if  clinically feasible in light of recent MI and stenting.  INTERVENTIONAL RECOMMENDATIONS: Severe LAD and LCx disease. PCI of proximal  and distal LCx with LULU in the setting of angina/NSTEMI. Patients remains  in sinus rhythm. Continue ASA 81 and plavix daily. Start Pradaxa 150mg BID  for AF CVA ppx after band pull. Will consider stopping one antiplatelet in  4-6 weeks in light of his A/C need for atrial fibrillation. Evaluation in  progress for internal carotid artery intervention. Would wait 4-6 weeks if  clinically feasible in light of recent MI and stenting.  Prepared and signed by  Colby Mazariegos M.D.  Signed 2014 10:13:38  HEMODYNAMIC TABLES  Pressures:  Intervention  Pressures:  - HR: 62  Pressures:  - Rhythm:  Pressures:  -- Aortic Pressure (S/D/M): 147/84/110  Outputs:  Baseline  Outputs:  -- CALCULATIONS[age]  Outputs:  --CALCULATIONS: Body Surface Area: 2.44  Outputs:  -- CALCULATIONS: Height in cm: 183.00  Outputs:  -- CALCULATIONS: Sex: Male  Outputs:  -- CALCULATIONS: Weight in k.70    < end of copied text >  [ ] Stress Test:  	    MEDICATIONS:  MEDICATIONS  (STANDING):  aspirin enteric coated 81 milliGRAM(s) Oral daily  ceFAZolin   IVPB 1000 milliGRAM(s) IV Intermittent every 8 hours  dextrose 5%. 1000 milliLiter(s) (50 mL/Hr) IV Continuous <Continuous>  dextrose 50% Injectable 12.5 Gram(s) IV Push once  dextrose 50% Injectable 25 Gram(s) IV Push once  dextrose 50% Injectable 25 Gram(s) IV Push once  furosemide    Tablet 80 milliGRAM(s) Oral daily  insulin lispro (HumaLOG) corrective regimen sliding scale   SubCutaneous three times a day before meals  insulin lispro (HumaLOG) corrective regimen sliding scale   SubCutaneous at bedtime  metoprolol succinate  milliGRAM(s) Oral daily  predniSONE   Tablet 30 milliGRAM(s) Oral every 24 hours  pregabalin 100 milliGRAM(s) Oral two times a day      FAMILY HISTORY:  Family history of heart disease (Father)      SOCIAL HISTORY:    [x] Non-smoker  [ ] Smoker  [ ] Alcohol    Allergies    No Known Allergies    Intolerances    	    REVIEW OF SYSTEMS:  CONSTITUTIONAL: No fever, weight loss, or fatigue  EYES: No eye pain, visual disturbances, or discharge  ENMT:  No difficulty hearing, tinnitus, vertigo; No sinus or throat pain  NECK: No pain or stiffness  RESPIRATORY: No cough, wheezing, chills or hemoptysis; No Shortness of Breath  CARDIOVASCULAR: No chest pain, palpitations, passing out, dizziness, edema, right ankle swelling   GASTROINTESTINAL: No abdominal or epigastric pain. No nausea, vomiting, or hematemesis; No diarrhea or constipation. No melena or hematochezia.  GENITOURINARY: No dysuria, frequency, hematuria, or incontinence  NEUROLOGICAL: No headaches, memory loss, loss of strength, numbness, or tremors  SKIN: Right ankle redness   	    [x ] All others negative	  [ ] Unable to obtain    PHYSICAL EXAM:  T(C): 36.7 (18 @ 04:00), Max: 37.8 (18 @ 18:03)  HR: 78 (18 @ 05:59) (76 - 100)  BP: 130/78 (18 @ 05:59) (115/61 - 154/83)  RR: 18 (18 @ 05:59) (16 - 20)  SpO2: 96% (18 @ 04:00) (95% - 98%)  Wt(kg): --  I&O's Summary    01 May 2018 07:01  -  02 May 2018 07:00  --------------------------------------------------------  IN: 250 mL / OUT: 500 mL / NET: -250 mL        Appearance: Normal	  Psychiatry: A & O x 3, Mood & affect appropriate  HEENT:   Normal oral mucosa, PERRL, EOMI	  Lymphatic: No lymphadenopathy  Cardiovascular: Normal S1 S2,Irregular, No JVD, No murmurs  Respiratory: Lungs clear to auscultation	  Gastrointestinal:  Soft, Non-tender, + BS	  Skin: No rashes, No ecchymoses, No cyanosis	  Neurologic: Non-focal  Extremities: Normal range of motion, No clubbing, cyanosis or edema, right ankle swelling/redness, warm to touch      TELEMETRY: 	    ECG: AFIB 91, non specific ST abnl.   RADIOLOGY:   OTHER: 	  < from: [facility] Duplex Lower Ext Vein Scan, Bilat (18 @ 19:38) >    IMPRESSION:     No evidence of bilateral lower extremity deep venous thrombosis.    < end of copied text >  	  LABS:	 	    CARDIAC MARKERS:                                  14.7   10.81 )-----------( 177      ( 02 May 2018 07:38 )             47.6     05-    139  |  97  |  57<H>  ----------------------------<  140<H>  3.5   |  29  |  1.96<H>    Ca    9.5      02 May 2018 06:04  Phos  3.8     05-  Mg     2.2     -    TPro  7.3  /  Alb  3.4  /  TBili  0.5  /  DBili  x   /  AST  15  /  ALT  7<L>  /  AlkPhos  71  05-02    PT/INR - ( 01 May 2018 18:10 )   PT: 15.8 sec;   INR: 1.45 ratio         PTT - ( 01 May 2018 18:10 )  PTT:53.8 sec  proBNP:   Lipid Profile:   HgA1c:   TSH: CARDIOLOGY CONSULT - Dr. Mazariegos     CHIEF COMPLAINT: Right ankle pain.     HPI:  64 yo male with PMHx below presenting with right ankle pain and swelling for 3 days. He states the pain was so severe that he was unable to bear weight. He also notes that his ankle feels "warm". This patient is well known to our practice, cardiac history includes, AFIB, HTN, HLD, PVD, CVA, CAD, NSTEMI s/p PCI to mid LAD, prox LCx, distal LCx,  chronic diastolic Heart failure, DM, severe left internal carotid disease s/p CEA. At present patient denies cp/sob/peraza, denies palpitations, dizziness, syncope, orthopnea, lower extremity edema. Recent echo in office from february revealing minimal aortic regurg, mild mitral regurg, moderate LAE, EF 60%, normal right ventricular systolic function.       PAST MEDICAL & SURGICAL HISTORY:  CAD (coronary artery disease)  MI (myocardial infarction): circa   Atrial fibrillation  TIA (transient ischemic attack)  DM type 2 (diabetes mellitus, type 2)  HLD (hyperlipidemia)  HTN (hypertension), benign  S/P carotid endarterectomy: left  Status post angioplasty with stent: LULU x 3 2014          PREVIOUS DIAGNOSTIC TESTING:    [ ] Echocardiogram:   [ ]  Catheterization:   < from: Cardiac Cath Lab (14 @ 08:52) >  INDICATIONS: Angina/MI: myocardial infarction without ST elevation  (NSTEMI), CCS class III, with onset 2-7 days prior to admission.  HISTORY: The patient has hypertension, insulin-controlled diabetes,  medication-treated dyslipidemia, morbid obesity, and a family history of  coronary artery disease. PRIOR CARDIOVASCULAR PROCEDURES: Stent of the mid  LAD.  PROCEDURE:  --  Intervention on distal circumflex: drug-eluting stent.  --  Intervention on proximal circumflex: drug-eluting stent.  TECHNIQUE: The risks and alternatives of the procedures and conscious  sedation were explained to the patient and informed consent was obtained.  Cardiac catheterization performed electively.  Local anesthetic given. Right radial artery access. RADIATION EXPOSURE: 16  min. A drug-eluting stent was performed on the 80 % lesion in the distal  circumflex. Following intervention there was a 1 % residual stenosis.  Vessel setup was performed. A 6FR EBU 3.5 LAUNCHER guiding catheter was  used to intubate the vessel. Vessel setup was performed. A [employer] UNIVERSAL  190CM wire was used to cross the lesion. Balloon angioplasty was  performed, using a 2.5 X 20 MAVERICK RX balloon, with 3 inflations and a  maximum inflation pressure of 12 tom. A 2.50 X 30 RESOLUTE drug-eluting  stent was placed across the lesion and deployed at a maximum inflation  pressure of 16 tom. Balloon angioplasty was performed, with 1 inflations  and a maximum inflation pressure of 16 tom. A drug-eluting stent was  performed on the 80 % lesion in the proximal circumflex. Following  intervention there was a 1 % residual stenosis. Vessel setup was  performed. A 6FR EBU 3.5 LAUNCHER guiding catheter was used to intubate  the vessel. Vessel setup was performed. A [employer] UNIVERSAL 190CM wire was  used to cross the lesion. A 3.50 X 15 RESOLUTE drug-eluting stent was  placed across the lesion and deployed at a maximum inflation pressure of  18 tom. Balloon angioplasty was performed, using a 4.0 X 12 NC SPRINTER  balloon, with 2 inflations and a maximum inflation pressure of 22 tom.  CONTRAST GIVEN: Omnipaque 189 ml.  MEDICATIONS GIVEN: Fentanyl, 25 mcg, IV. Midazolam, 2 mg, IV. Midazolam, 1  mg, IV. Verapamil (Isoptin, Calan, Covera), 2 mg, IA. Nitroglycerin, 100  mcg, intracoronary. Heparin, 3000 units, IA. Heparin, 5000 units, IV.  VENTRICLES: No left ventriculogram was performed.  CORONARY VESSELS:  CX:   --  Proximal circumflex: There was a 80 % stenosis.  --  Distal circumflex: There was a 80 % stenosis.  COMPLICATIONS: There were no complications.  DIAGNOSTIC RECOMMENDATIONS: Severe LAD and LCx disease. PCI of proximal and  distal LCx with LULU in the setting of angina/NSTEMI. Patients remains in  sinus rhythm. Continue ASA 81 and plavix daily. Start Pradaxa 150mg BID  for AF CVA ppx after band pull. Will consider stopping one antiplatelet in  4-6 weeks in light of his A/C need for atrial fibrillation. Evaluation in  progress for internal carotid artery intervention. Would wait 4-6 weeks if  clinically feasible in light of recent MI and stenting.  INTERVENTIONAL RECOMMENDATIONS: Severe LAD and LCx disease. PCI of proximal  and distal LCx with LULU in the setting of angina/NSTEMI. Patients remains  in sinus rhythm. Continue ASA 81 and plavix daily. Start Pradaxa 150mg BID  for AF CVA ppx after band pull. Will consider stopping one antiplatelet in  4-6 weeks in light of his A/C need for atrial fibrillation. Evaluation in  progress for internal carotid artery intervention. Would wait 4-6 weeks if  clinically feasible in light of recent MI and stenting.  Prepared and signed by  Colby Mazariegos M.D.  Signed 2014 10:13:38  HEMODYNAMIC TABLES  Pressures:  Intervention  Pressures:  - HR: 62  Pressures:  - Rhythm:  Pressures:  -- Aortic Pressure (S/D/M): 147/84/110  Outputs:  Baseline  Outputs:  -- CALCULATIONS[age]  Outputs:  --CALCULATIONS: Body Surface Area: 2.44  Outputs:  -- CALCULATIONS: Height in cm: 183.00  Outputs:  -- CALCULATIONS: Sex: Male  Outputs:  -- CALCULATIONS: Weight in k.70    < end of copied text >  [ ] Stress Test:  	    MEDICATIONS:  MEDICATIONS  (STANDING):  aspirin enteric coated 81 milliGRAM(s) Oral daily  ceFAZolin   IVPB 1000 milliGRAM(s) IV Intermittent every 8 hours  dextrose 5%. 1000 milliLiter(s) (50 mL/Hr) IV Continuous <Continuous>  dextrose 50% Injectable 12.5 Gram(s) IV Push once  dextrose 50% Injectable 25 Gram(s) IV Push once  dextrose 50% Injectable 25 Gram(s) IV Push once  furosemide    Tablet 80 milliGRAM(s) Oral daily  insulin lispro (HumaLOG) corrective regimen sliding scale   SubCutaneous three times a day before meals  insulin lispro (HumaLOG) corrective regimen sliding scale   SubCutaneous at bedtime  metoprolol succinate  milliGRAM(s) Oral daily  predniSONE   Tablet 30 milliGRAM(s) Oral every 24 hours  pregabalin 100 milliGRAM(s) Oral two times a day      FAMILY HISTORY:  Family history of heart disease (Father)      SOCIAL HISTORY:    [x] Non-smoker  [ ] Smoker  [ ] Alcohol    Allergies    No Known Allergies    Intolerances    	    REVIEW OF SYSTEMS:  CONSTITUTIONAL: No fever, weight loss, or fatigue  EYES: No eye pain, visual disturbances, or discharge  ENMT:  No difficulty hearing, tinnitus, vertigo; No sinus or throat pain  NECK: No pain or stiffness  RESPIRATORY: No cough, wheezing, chills or hemoptysis; No Shortness of Breath  CARDIOVASCULAR: No chest pain, palpitations, passing out, dizziness, edema, right ankle swelling   GASTROINTESTINAL: No abdominal or epigastric pain. No nausea, vomiting, or hematemesis; No diarrhea or constipation. No melena or hematochezia.  GENITOURINARY: No dysuria, frequency, hematuria, or incontinence  NEUROLOGICAL: No headaches, memory loss, loss of strength, numbness, or tremors  SKIN: Right ankle redness   	    [x ] All others negative	  [ ] Unable to obtain    PHYSICAL EXAM:  T(C): 36.7 (18 @ 04:00), Max: 37.8 (18 @ 18:03)  HR: 78 (18 @ 05:59) (76 - 100)  BP: 130/78 (18 @ 05:59) (115/61 - 154/83)  RR: 18 (18 @ 05:59) (16 - 20)  SpO2: 96% (18 @ 04:00) (95% - 98%)  Wt(kg): --  I&O's Summary    01 May 2018 07:01  -  02 May 2018 07:00  --------------------------------------------------------  IN: 250 mL / OUT: 500 mL / NET: -250 mL        Appearance: Normal	  Psychiatry: A & O x 3, Mood & affect appropriate  HEENT:   Normal oral mucosa, PERRL, EOMI	  Lymphatic: No lymphadenopathy  Cardiovascular: Normal S1 S2,Irregular, No JVD, No murmurs  Respiratory: Lungs clear to auscultation	  Gastrointestinal:  Soft, Non-tender, + BS	  Skin: No rashes, No ecchymoses, No cyanosis	  Neurologic: Non-focal  Extremities: Normal range of motion, No clubbing, cyanosis or edema, right ankle swelling/redness, warm to touch      TELEMETRY: 	    ECG: AFIB 91, non specific ST abnl.   RADIOLOGY:   OTHER: 	  < from: [facility] Duplex Lower Ext Vein Scan, Bilat (18 @ 19:38) >    IMPRESSION:     No evidence of bilateral lower extremity deep venous thrombosis.    < end of copied text >  	  LABS:	 	    CARDIAC MARKERS:                                  14.7   10.81 )-----------( 177      ( 02 May 2018 07:38 )             47.6     05-02    139  |  97  |  57<H>  ----------------------------<  140<H>  3.5   |  29  |  1.96<H>    Ca    9.5      02 May 2018 06:04  Phos  3.8     05-  Mg     2.2     -    TPro  7.3  /  Alb  3.4  /  TBili  0.5  /  DBili  x   /  AST  15  /  ALT  7<L>  /  AlkPhos  71  05-02    PT/INR - ( 01 May 2018 18:10 )   PT: 15.8 sec;   INR: 1.45 ratio         PTT - ( 01 May 2018 18:10 )  PTT:53.8 sec  proBNP:   Lipid Profile:   HgA1c:   TSH:

## 2018-05-02 NOTE — DISCHARGE NOTE ADULT - NS AS DC FOLLOWUP STROKE INST
Influenza vaccination ([facility] Pub Date: August 7, 2015)/Heart Failure/Stroke (includes: TIA/SAH/ICH/Ischemic Stroke)/Smoking Cessation Heart Failure/Smoking Cessation/Influenza vaccination ([facility] Pub Date: August 7, 2015) Smoking Cessation/Heart Failure

## 2018-05-02 NOTE — DISCHARGE NOTE ADULT - MEDICATION SUMMARY - MEDICATIONS TO STOP TAKING
I will STOP taking the medications listed below when I get home from the hospital:    doxazosin 2 mg oral tablet  -- 1 tab(s) by mouth once a day (at bedtime)    isosorbide mononitrate 30 mg oral tablet, extended release  -- 1 tab(s) by mouth once a day    insulin glargine  -- 32 unit(s) subcutaneous once a day (at bedtime)    insulin lispro 100 units/mL subcutaneous solution  --  subcutaneous 3 times a day (before meals); 1 Unit(s) if Glucose 151 - 200  2 Unit(s) if Glucose 201 - 250  3 Unit(s) if Glucose 251 - 300  4 Unit(s) if Glucose 301 - 350  5 Unit(s) if Glucose 351 - 400  6 Unit(s) if Glucose Greater Than 400    insulin lispro 100 units/mL subcutaneous solution  --  subcutaneous once a day (at bedtime); 0 Unit(s) if Glucose 0 - 250  1 Unit(s) if Glucose 251 - 300  2 Unit(s) if Glucose 301 - 350  3 Unit(s) if Glucose 351 - 400  4 Unit(s) if Glucose Greater Than 400    simvastatin 40 mg oral tablet  -- 1 tab(s) by mouth once a day (at bedtime)    amLODIPine 10 mg oral tablet  -- 1 tab(s) by mouth once a day    hydrALAZINE 25 mg oral tablet  -- 3 tab(s) by mouth 3 times a day    insulin lispro 100 units/mL subcutaneous solution  -- 11 unit(s) subcutaneous 3 times a day (before meals)    oxymetazoline 0.05% nasal spray  -- 1 spray(s) in each nostril once a day  x 3 days I will STOP taking the medications listed below when I get home from the hospital:    doxazosin 2 mg oral tablet  -- 1 tab(s) by mouth once a day (at bedtime)    isosorbide mononitrate 30 mg oral tablet, extended release  -- 1 tab(s) by mouth once a day    insulin glargine  -- 32 unit(s) subcutaneous once a day (at bedtime)    insulin lispro 100 units/mL subcutaneous solution  --  subcutaneous 3 times a day (before meals); 1 Unit(s) if Glucose 151 - 200  2 Unit(s) if Glucose 201 - 250  3 Unit(s) if Glucose 251 - 300  4 Unit(s) if Glucose 301 - 350  5 Unit(s) if Glucose 351 - 400  6 Unit(s) if Glucose Greater Than 400    insulin lispro 100 units/mL subcutaneous solution  --  subcutaneous once a day (at bedtime); 0 Unit(s) if Glucose 0 - 250  1 Unit(s) if Glucose 251 - 300  2 Unit(s) if Glucose 301 - 350  3 Unit(s) if Glucose 351 - 400  4 Unit(s) if Glucose Greater Than 400    simvastatin 40 mg oral tablet  -- 1 tab(s) by mouth once a day (at bedtime)    amLODIPine 10 mg oral tablet  -- 1 tab(s) by mouth once a day    insulin lispro 100 units/mL subcutaneous solution  -- 11 unit(s) subcutaneous 3 times a day (before meals)    oxymetazoline 0.05% nasal spray  -- 1 spray(s) in each nostril once a day  x 3 days

## 2018-05-02 NOTE — PROGRESS NOTE ADULT - SUBJECTIVE AND OBJECTIVE BOX
Patient is a 63y Male  being evaluated for  ckd3  pt known to me  denies pain or fevers  notes reviewed                     PHYSICAL EXAM:  Vitals:  T(F): 98 (05-02-18 @ 04:00), Max: 100.1 (05-01-18 @ 18:03)  HR: 78 (05-02-18 @ 05:59)  BP: 130/78 (05-02-18 @ 05:59)  BP(mean): --  RR: 18 (05-02-18 @ 05:59)  SpO2: 96% (05-02-18 @ 04:00)  Wt(kg): --  Constitutional: no acute distress  Respiratory: cta/p bilat, no wheezes, rales, nl effort  Cardiovascular: regular rate, S1 and S2 no rub or gallop  Abd: : BS+, soft, NT , no rebound or guarding, no bruits,obese  Extremities: bilat le edema and discoloration toes  Neurological: A/O x 3, nonfocal    I and O's:    05-01 @ 07:01  -  05-02 @ 07:00  --------------------------------------------------------  IN: 0 mL / OUT: 500 mL / NET: -500 mL        Height (cm): 180.34 (05-02 @ 04:00)  Weight (kg): 137.2 (05-02 @ 04:00)  BMI (kg/m2): 42.2 (05-02 @ 04:00)    REVIEW OF SYSTEMS:  CONSTITUTIONAL: No weakness, fevers or chills  RESPIRATORY: No cough, wheezing, hemoptysis; No shortness of breath  CARDIOVASCULAR: No chest pain or palpitations  GI : no abd pains, nausea or vomiting  GENITOURINARY: No dysuria, frequency or hematuria  All other review of systems is negative unless indicated above.    Allergies    No Known Allergies    Intolerances        MEDICATIONS  (STANDING):  aspirin enteric coated 81 milliGRAM(s) Oral daily  ceFAZolin   IVPB 1000 milliGRAM(s) IV Intermittent every 8 hours  dextrose 5%. 1000 milliLiter(s) (50 mL/Hr) IV Continuous <Continuous>  dextrose 50% Injectable 12.5 Gram(s) IV Push once  dextrose 50% Injectable 25 Gram(s) IV Push once  dextrose 50% Injectable 25 Gram(s) IV Push once  furosemide    Tablet 80 milliGRAM(s) Oral daily  insulin lispro (HumaLOG) corrective regimen sliding scale   SubCutaneous three times a day before meals  insulin lispro (HumaLOG) corrective regimen sliding scale   SubCutaneous at bedtime  metoprolol succinate  milliGRAM(s) Oral daily  predniSONE   Tablet 30 milliGRAM(s) Oral every 24 hours  pregabalin 100 milliGRAM(s) Oral two times a day      LABS:    05-01 @ 07:01  -  05-02 @ 07:00  --------------------------------------------------------  IN: 0 mL / OUT: 500 mL / NET: -500 mL      Height (cm): 180.34 (05-02 @ 04:00)  Weight (kg): 137.2 (05-02 @ 04:00)  BMI (kg/m2): 42.2 (05-02 @ 04:00)      05-01 @ 07:01  -  05-02 @ 07:00  --------------------------------------------------------  IN: 0 mL / OUT: 500 mL / NET: -500 mL      Height (cm): 180.34 (05-02 @ 04:00)  Weight (kg): 137.2 (05-02 @ 04:00)  BMI (kg/m2): 42.2 (05-02 @ 04:00)    05-01 @ 07:01  -  05-02 @ 07:00  --------------------------------------------------------  IN: 0 mL / OUT: 500 mL / NET: -500 mL      Height (cm): 180.34 (05-02 @ 04:00)  Weight (kg): 137.2 (05-02 @ 04:00)  BMI (kg/m2): 42.2 (05-02 @ 04:00)              CBC Full  -  ( 01 May 2018 18:10 )  WBC Count : 11.4 K/uL  Hemoglobin : 15.8 g/dL  Hematocrit : 48.2 %  Platelet Count - Automated : 205 K/uL  Mean Cell Volume : 85.1 fl  Mean Cell Hemoglobin : 27.9 pg  Mean Cell Hemoglobin Concentration : 32.8 gm/dL  Auto Neutrophil # : 8.0 K/uL  Auto Lymphocyte # : 2.4 K/uL  Auto Monocyte # : 0.9 K/uL  Auto Eosinophil # : 0.0 K/uL  Auto Basophil # : 0.0 K/uL  Auto Neutrophil % : 70.2 %  Auto Lymphocyte % : 21.1 %  Auto Monocyte % : 8.1 %  Auto Eosinophil % : 0.3 %  Auto Basophil % : 0.4 %    05-02    139  |  97  |  57<H>  ----------------------------<  140<H>  3.5   |  29  |  1.96<H>    Ca    9.5      02 May 2018 06:04  Phos  3.8     05-02  Mg     2.2     05-02    TPro  7.3  /  Alb  3.4  /  TBili  0.5  /  DBili  x   /  AST  15  /  ALT  7<L>  /  AlkPhos  71  05-02            Imp:  63y Male  ckd3 stable for him  workup in progress for foot pain  no nsaids  dose all meds for crclless than 35 cc/min  will follow pt with you  homero pt

## 2018-05-02 NOTE — DISCHARGE NOTE ADULT - PLAN OF CARE
Resolution of infection Take all of your antibiotics as ordered.  Call your Health Care Provider within two days of arriving home to make a follow up appointment within one week.  If the affected cellulitic area increases in redness, warmth, pain or swelling call your Health Care Provider.  If you develop fever, chills, and/or malaise, call your Health Care Provider. Resolved Continue with medication as prescribed.   Follow-up with your primary care physician within 1 week. Call for appointment. Atrial fibrillation is the most common heart rhythm problem & has the risk of stroke & heart attack  It helps if you control your blood pressure, not drink more than 1-2 alcohol drinks per day, cut down on caffeine, getting treatment for over active thyroid gland, & getting exercise  Call your doctor if you feel your heart racing or beating unusually, chest tightness or pain, lightheaded, faint, shortness of breath especially with exercise  It is important to take your heart medication as prescribed  You may be on anticoagulation which is very important to take as directed - you may need blood work to monitor drug levels Continue with Pradaxa as prescribed by your doctor.  Follow-up with your primary care physician and cardiologist  within 1 week. Call for appointment. HgA1C this admission 9.9  Make sure you get your HgA1c checked every three months.  If you take oral diabetes medications, check your blood glucose two times a day.  If you take insulin, check your blood glucose before meals and at bedtime.  It's important not to skip any meals.  Keep a log of your blood glucose results and always take it with you to your doctor appointments.  Keep a list of your current medications including injectables and over the counter medications and bring this medication list with you to all your doctor appointments.  If you have not seen your ophthalmologist this year call for appointment.  Check your feet daily for redness, sores, or openings. Do not self treat. If no improvement in two days call your primary care physician for an appointment.  Low blood sugar (hypoglycemia) is a blood sugar below 70mg/dl. Check your blood sugar if you feel signs/symptoms of hypoglycemia. If your blood sugar is below 70 take 15 grams of carbohydrates (ex 4 oz of apple juice, 3-4 glucose tablets, or 4-6 oz of regular soda) wait 15 minutes and repeat blood sugar to make sure it comes up above 70.  If your blood sugar is above 70 and you are due for a meal, have a meal.  If you are not due for a meal have a snack.  This snack helps keeps your blood sugar at a safe range. Low salt diet  Activity as tolerated.  Take all medication as prescribed.  Follow up with your medical doctor for routine blood pressure monitoring at your next visit.  Notify your doctor if you have any of the following symptoms:   Dizziness, Lightheadedness, Blurry vision, Headache, Chest pain, Shortness of breath Continue with your cholesterol medications. Eat a heart healthy diet that is low in saturated fats and salt, and includes whole grains, fruits, vegetables and lean protein; exercise regularly (consult with your physician or cardiologist first); maintain a heart healthy weight; if you smoke - quit (A resource to help you stop smoking is the [facility] – phone number [phone].). Continue to follow with your primary physician or cardiologist.

## 2018-05-02 NOTE — PROGRESS NOTE ADULT - SUBJECTIVE AND OBJECTIVE BOX
CHIEF COMPLAINT:Patient is a 63y old  Male who presents with a chief complaint of sob (02 May 2018 10:25)        Allergies:  No Known Allergies      PAST MEDICAL & SURGICAL HISTORY:  CAD (coronary artery disease)  MI (myocardial infarction): circa 2014  Atrial fibrillation  TIA (transient ischemic attack)  DM type 2 (diabetes mellitus, type 2)  HLD (hyperlipidemia)  HTN (hypertension), benign  S/P carotid endarterectomy: left  Status post angioplasty with stent: LULU x 3 2/7/2014      FAMILY HISTORY:  Family history of heart disease (Father)      REVIEW OF SYSTEMS:  CONSTITUTIONAL: No fever, weight loss, or fatigue  EYES: No eye pain, visual disturbances, or discharge  NECK: No pain or stiffness  RESPIRATORY: No cough or wheezing, no shortness of breath  CARDIOVASCULAR: No chest pain, palpitations, dizziness, or leg swelling  GASTROINTESTINAL: No abdominal or epigastric pain. No nausea, vomiting, diarrhea or constipation  GENITOURINARY: No dysuria, urinary frequency or urgency, no hematuria  NEUROLOGICAL: No headaches, memory loss, loss of strength, numbness, or tremors  SKIN: No itching, burning, rashes, or lesions   MUSCULOSKELETAL: R ankle pain improving    Medications:  MEDICATIONS  (STANDING):  aspirin enteric coated 81 milliGRAM(s) Oral daily  ceFAZolin   IVPB 1000 milliGRAM(s) IV Intermittent every 8 hours  dextrose 5%. 1000 milliLiter(s) (50 mL/Hr) IV Continuous <Continuous>  dextrose 50% Injectable 12.5 Gram(s) IV Push once  dextrose 50% Injectable 25 Gram(s) IV Push once  dextrose 50% Injectable 25 Gram(s) IV Push once  furosemide    Tablet 80 milliGRAM(s) Oral daily  insulin lispro (HumaLOG) corrective regimen sliding scale   SubCutaneous three times a day before meals  insulin lispro (HumaLOG) corrective regimen sliding scale   SubCutaneous at bedtime  metoprolol succinate  milliGRAM(s) Oral daily  pregabalin 100 milliGRAM(s) Oral three times a day    MEDICATIONS  (PRN):  acetaminophen   Tablet 650 milliGRAM(s) Oral every 6 hours PRN For Temp greater than 38 C (100.4 F)  dextrose Gel 1 Dose(s) Oral once PRN Blood Glucose LESS THAN 70 milliGRAM(s)/deciliter  glucagon  Injectable 1 milliGRAM(s) IntraMuscular once PRN Glucose LESS THAN 70 milligrams/deciliter    	    PHYSICAL EXAM:  T(C): 36.3 (05-02-18 @ 11:32), Max: 37.8 (05-01-18 @ 18:03)  HR: 87 (05-02-18 @ 11:32) (76 - 100)  BP: 121/77 (05-02-18 @ 11:32) (115/61 - 154/83)  RR: 18 (05-02-18 @ 11:32) (16 - 20)  SpO2: 96% (05-02-18 @ 11:32) (95% - 98%)  Wt(kg): --  I&O's Summary    01 May 2018 07:01  -  02 May 2018 07:00  --------------------------------------------------------  IN: 250 mL / OUT: 500 mL / NET: -250 mL    02 May 2018 07:01  -  02 May 2018 13:55  --------------------------------------------------------  IN: 480 mL / OUT: 500 mL / NET: -20 mL        Appearance: Normal	  HEENT:   NCAT, PERRL, EOMI	  Lymphatic: No lymphadenopathy  Cardiovascular: Normal S1 S2, RRR  Respiratory: Lungs clear to auscultation BL  Psychiatry: A & O x 3, Mood & affect appropriate  Gastrointestinal:  Soft, Non-tender, + BS  Skin: No rashes, No ecchymoses, No cyanosis	  Neurologic: Non-focal  Extremities: R ankle tender, swollen    	  LABS:	 	    CARDIAC MARKERS:                                14.7   10.81 )-----------( 177      ( 02 May 2018 07:38 )             47.6     05-02    139  |  97  |  57<H>  ----------------------------<  140<H>  3.5   |  29  |  1.96<H>    Ca    9.5      02 May 2018 06:04  Phos  3.8     05-02  Mg     2.2     05-02    TPro  7.3  /  Alb  3.4  /  TBili  0.5  /  DBili  x   /  AST  15  /  ALT  7<L>  /  AlkPhos  71  05-02    proBNP:   Lipid Profile:   HgA1c:   TSH:

## 2018-05-02 NOTE — DISCHARGE NOTE ADULT - NS AS DC STROKE ED MATERIALS
Stroke Education Booklet/Stroke Warning Signs and Symptoms/Need for Followup After Discharge/Prescribed Medications/Call 911 for Stroke/Risk Factors for Stroke

## 2018-05-02 NOTE — DISCHARGE NOTE ADULT - SECONDARY DIAGNOSIS.
Shortness of breath Atrial fibrillation Status post angioplasty with stent DM type 2 (diabetes mellitus, type 2) HTN (hypertension), benign HLD (hyperlipidemia)

## 2018-05-03 VITALS — WEIGHT: 302.25 LBS

## 2018-05-03 LAB
ANION GAP SERPL CALC-SCNC: 12 MMOL/L — SIGNIFICANT CHANGE UP (ref 5–17)
BUN SERPL-MCNC: 52 MG/DL — HIGH (ref 7–23)
CALCIUM SERPL-MCNC: 9 MG/DL — SIGNIFICANT CHANGE UP (ref 8.4–10.5)
CHLORIDE SERPL-SCNC: 96 MMOL/L — SIGNIFICANT CHANGE UP (ref 96–108)
CO2 SERPL-SCNC: 28 MMOL/L — SIGNIFICANT CHANGE UP (ref 22–31)
CREAT SERPL-MCNC: 1.73 MG/DL — HIGH (ref 0.5–1.3)
GLUCOSE BLDC GLUCOMTR-MCNC: 203 MG/DL — HIGH (ref 70–99)
GLUCOSE BLDC GLUCOMTR-MCNC: 206 MG/DL — HIGH (ref 70–99)
GLUCOSE SERPL-MCNC: 175 MG/DL — HIGH (ref 70–99)
HBA1C BLD-MCNC: 9.9 % — HIGH (ref 4–5.6)
HCT VFR BLD CALC: 45.8 % — SIGNIFICANT CHANGE UP (ref 39–50)
HGB BLD-MCNC: 14.6 G/DL — SIGNIFICANT CHANGE UP (ref 13–17)
MCHC RBC-ENTMCNC: 27 PG — SIGNIFICANT CHANGE UP (ref 27–34)
MCHC RBC-ENTMCNC: 31.9 GM/DL — LOW (ref 32–36)
MCV RBC AUTO: 84.7 FL — SIGNIFICANT CHANGE UP (ref 80–100)
PLATELET # BLD AUTO: 198 K/UL — SIGNIFICANT CHANGE UP (ref 150–400)
POTASSIUM SERPL-MCNC: 3.4 MMOL/L — LOW (ref 3.5–5.3)
POTASSIUM SERPL-SCNC: 3.4 MMOL/L — LOW (ref 3.5–5.3)
RBC # BLD: 5.41 M/UL — SIGNIFICANT CHANGE UP (ref 4.2–5.8)
RBC # FLD: 14.7 % — HIGH (ref 10.3–14.5)
SODIUM SERPL-SCNC: 136 MMOL/L — SIGNIFICANT CHANGE UP (ref 135–145)
WBC # BLD: 10.94 K/UL — HIGH (ref 3.8–10.5)
WBC # FLD AUTO: 10.94 K/UL — HIGH (ref 3.8–10.5)

## 2018-05-03 PROCEDURE — 82330 ASSAY OF CALCIUM: CPT

## 2018-05-03 PROCEDURE — 85730 THROMBOPLASTIN TIME PARTIAL: CPT

## 2018-05-03 PROCEDURE — 84295 ASSAY OF SERUM SODIUM: CPT

## 2018-05-03 PROCEDURE — 85652 RBC SED RATE AUTOMATED: CPT

## 2018-05-03 PROCEDURE — 83036 HEMOGLOBIN GLYCOSYLATED A1C: CPT

## 2018-05-03 PROCEDURE — 82803 BLOOD GASES ANY COMBINATION: CPT

## 2018-05-03 PROCEDURE — 73610 X-RAY EXAM OF ANKLE: CPT

## 2018-05-03 PROCEDURE — 84132 ASSAY OF SERUM POTASSIUM: CPT

## 2018-05-03 PROCEDURE — 93970 EXTREMITY STUDY: CPT

## 2018-05-03 PROCEDURE — 83735 ASSAY OF MAGNESIUM: CPT

## 2018-05-03 PROCEDURE — 85014 HEMATOCRIT: CPT

## 2018-05-03 PROCEDURE — 83605 ASSAY OF LACTIC ACID: CPT

## 2018-05-03 PROCEDURE — 82947 ASSAY GLUCOSE BLOOD QUANT: CPT

## 2018-05-03 PROCEDURE — 85027 COMPLETE CBC AUTOMATED: CPT

## 2018-05-03 PROCEDURE — 82435 ASSAY OF BLOOD CHLORIDE: CPT

## 2018-05-03 PROCEDURE — 84100 ASSAY OF PHOSPHORUS: CPT

## 2018-05-03 PROCEDURE — 99285 EMERGENCY DEPT VISIT HI MDM: CPT | Mod: 25

## 2018-05-03 PROCEDURE — 85610 PROTHROMBIN TIME: CPT

## 2018-05-03 PROCEDURE — 96374 THER/PROPH/DIAG INJ IV PUSH: CPT

## 2018-05-03 PROCEDURE — 97161 PT EVAL LOW COMPLEX 20 MIN: CPT

## 2018-05-03 PROCEDURE — 84145 PROCALCITONIN (PCT): CPT

## 2018-05-03 PROCEDURE — 80048 BASIC METABOLIC PNL TOTAL CA: CPT

## 2018-05-03 PROCEDURE — 86140 C-REACTIVE PROTEIN: CPT

## 2018-05-03 PROCEDURE — 80053 COMPREHEN METABOLIC PANEL: CPT

## 2018-05-03 PROCEDURE — 87040 BLOOD CULTURE FOR BACTERIA: CPT

## 2018-05-03 PROCEDURE — 84550 ASSAY OF BLOOD/URIC ACID: CPT

## 2018-05-03 PROCEDURE — 99232 SBSQ HOSP IP/OBS MODERATE 35: CPT

## 2018-05-03 PROCEDURE — 82962 GLUCOSE BLOOD TEST: CPT

## 2018-05-03 RX ORDER — CEPHALEXIN 500 MG
1 CAPSULE ORAL
Qty: 15 | Refills: 0 | OUTPATIENT
Start: 2018-05-03 | End: 2018-05-07

## 2018-05-03 RX ORDER — DABIGATRAN ETEXILATE MESYLATE 150 MG/1
150 CAPSULE ORAL EVERY 12 HOURS
Qty: 0 | Refills: 0 | Status: DISCONTINUED | OUTPATIENT
Start: 2018-05-03 | End: 2018-05-03

## 2018-05-03 RX ORDER — ACETAMINOPHEN 500 MG
2 TABLET ORAL
Qty: 0 | Refills: 0 | COMMUNITY
Start: 2018-05-03

## 2018-05-03 RX ORDER — POTASSIUM CHLORIDE 20 MEQ
20 PACKET (EA) ORAL ONCE
Qty: 0 | Refills: 0 | Status: COMPLETED | OUTPATIENT
Start: 2018-05-03 | End: 2018-05-03

## 2018-05-03 RX ADMIN — Medication 4: at 09:11

## 2018-05-03 RX ADMIN — Medication 20 MILLIEQUIVALENT(S): at 09:21

## 2018-05-03 RX ADMIN — Medication 100 MILLIGRAM(S): at 06:12

## 2018-05-03 RX ADMIN — Medication 4: at 12:27

## 2018-05-03 RX ADMIN — Medication 100 MILLIGRAM(S): at 14:16

## 2018-05-03 RX ADMIN — Medication 80 MILLIGRAM(S): at 06:12

## 2018-05-03 RX ADMIN — Medication 81 MILLIGRAM(S): at 12:38

## 2018-05-03 RX ADMIN — Medication 100 MILLIGRAM(S): at 06:11

## 2018-05-03 NOTE — PROGRESS NOTE ADULT - SUBJECTIVE AND OBJECTIVE BOX
Patient is a 63y Male  being evaluated for   ANT on CKD  feels well  no leg pain  Voiding without difficulty. No dysuria, hematuria, urgency.  wants to go home        PHYSICAL EXAM:  Vitals:T(C): 36.4 (05-03-18 @ 04:54), Max: 37.1 (05-02-18 @ 20:40)  HR: 82 (05-03-18 @ 04:54) (56 - 87)  BP: 120/89 (05-03-18 @ 04:54) (120/89 - 150/78)  RR: 17 (05-03-18 @ 04:54) (17 - 18)  SpO2: 97% (05-03-18 @ 04:54) (96% - 97%)  Wt(kg): --    General: no acute distress  HEENT:  NC, ext ears nl, oropharynx clear,  nose nl  Neck: No JVD, bruit, adenopathy or thyromegaly  Eyes: PERRL, no discharge, no icterus  Respiratory: cta/p bilat, no wheezes, rales, nl effort  Cardiovascular: regular rate, S1 and S2 no rub or gallop  Abd: : BS+, soft, NT , no rebound or guarding, no bruits  Extremities: tr edema, no cyanosis      I and O's:  05-02 @ 07:01  -  05-03 @ 07:00  --------------------------------------------------------  IN: 1400 mL / OUT: 900 mL / NET: 500 mL              REVIEW OF SYSTEMS:  CONSTITUTIONAL: No weakness, fevers or chills  RESPIRATORY: No cough No wheezing No hemoptysis; No shortness of breath  CARDIOVASCULAR: No chest pain No palpitations  GI : no abd pain No nausea No vomiting  GENITOURINARY: No dysuria, frequency or hematuria  All other review of systems is negative unless indicated above.    Allergies    No Known Allergies    Intolerances          MEDICATIONS  (STANDING):  aspirin enteric coated 81 milliGRAM(s) Oral daily  ceFAZolin   IVPB 1000 milliGRAM(s) IV Intermittent every 8 hours  dextrose 5%. 1000 milliLiter(s) (50 mL/Hr) IV Continuous <Continuous>  dextrose 50% Injectable 12.5 Gram(s) IV Push once  dextrose 50% Injectable 25 Gram(s) IV Push once  dextrose 50% Injectable 25 Gram(s) IV Push once  furosemide    Tablet 80 milliGRAM(s) Oral daily  insulin lispro (HumaLOG) corrective regimen sliding scale   SubCutaneous three times a day before meals  insulin lispro (HumaLOG) corrective regimen sliding scale   SubCutaneous at bedtime  metoprolol succinate  milliGRAM(s) Oral daily  potassium chloride    Tablet ER 20 milliEquivalent(s) Oral once  pregabalin 100 milliGRAM(s) Oral three times a day      Sodium,Serum 136    05-03 @ 06:29  Sodium,Serum 139    05-02 @ 06:04  Sodium,Serum 137    05-01 @ 18:10    Potassium,Serum 3.4    05-03 @ 06:29  Potassium,Serum 3.5    05-02 @ 06:04  Potassium,Serum 4.2    05-01 @ 18:10    Chloride,Serum 96    05-03 @ 06:29  Chloride,Serum 97    05-02 @ 06:04  Chloride,Serum 94    05-01 @ 18:10    CO2, Serum 28    05-03 @ 06:29  CO2, Serum 29    05-02 @ 06:04  CO2, Serum 30    05-01 @ 18:10    BUN 52    05-03 @ 06:29  BUN 57    05-02 @ 06:04  BUN 66    05-01 @ 18:10    Creatinine, Serum 1.73    05-03 @ 06:29  Creatinine, Serum 1.96    05-02 @ 06:04  Creatinine, Serum 2.31    05-01 @ 18:10      05-03    136  |  96  |  52<H>  ----------------------------<  175<H>  3.4<L>   |  28  |  1.73<H>    Ca    9.0      03 May 2018 06:29  Phos  3.8     05-02  Mg     2.2     05-02    TPro  7.3  /  Alb  3.4  /  TBili  0.5  /  DBili  x   /  AST  15  /  ALT  7<L>  /  AlkPhos  71  05-02                            14.7   10.81 )-----------( 177      ( 02 May 2018 07:38 )             47.6

## 2018-05-03 NOTE — PROGRESS NOTE ADULT - SUBJECTIVE AND OBJECTIVE BOX
CHIEF COMPLAINT:Patient is a 63y old  Male who presents with a chief complaint of sob (02 May 2018 10:25)    feeling better    Allergies:  No Known Allergies      PAST MEDICAL & SURGICAL HISTORY:  CAD (coronary artery disease)  MI (myocardial infarction): circa 2014  Atrial fibrillation  TIA (transient ischemic attack)  DM type 2 (diabetes mellitus, type 2)  HLD (hyperlipidemia)  HTN (hypertension), benign  S/P carotid endarterectomy: left  Status post angioplasty with stent: LULU x 3 2/7/2014      FAMILY HISTORY:  Family history of heart disease (Father)      REVIEW OF SYSTEMS:  CONSTITUTIONAL: No fever, weight loss, or fatigue  EYES: No eye pain, visual disturbances, or discharge  NECK: No pain or stiffness  RESPIRATORY: No cough or wheezing, no shortness of breath  CARDIOVASCULAR: No chest pain, palpitations, dizziness, or leg swelling  GASTROINTESTINAL: No abdominal or epigastric pain. No nausea, vomiting, diarrhea or constipation  GENITOURINARY: No dysuria, urinary frequency or urgency, no hematuria  NEUROLOGICAL: No headaches, memory loss, loss of strength, numbness, or tremors  SKIN: No itching, burning, rashes, or lesions   MUSCULOSKELETAL: R ankle pain improving      Medications:  MEDICATIONS  (STANDING):  aspirin enteric coated 81 milliGRAM(s) Oral daily  ceFAZolin   IVPB 1000 milliGRAM(s) IV Intermittent every 8 hours  dabigatran 150 milliGRAM(s) Oral every 12 hours  dextrose 5%. 1000 milliLiter(s) (50 mL/Hr) IV Continuous <Continuous>  dextrose 50% Injectable 12.5 Gram(s) IV Push once  dextrose 50% Injectable 25 Gram(s) IV Push once  dextrose 50% Injectable 25 Gram(s) IV Push once  furosemide    Tablet 80 milliGRAM(s) Oral daily  insulin lispro (HumaLOG) corrective regimen sliding scale   SubCutaneous three times a day before meals  insulin lispro (HumaLOG) corrective regimen sliding scale   SubCutaneous at bedtime  metoprolol succinate  milliGRAM(s) Oral daily  pregabalin 100 milliGRAM(s) Oral three times a day    MEDICATIONS  (PRN):  acetaminophen   Tablet 650 milliGRAM(s) Oral every 6 hours PRN For Temp greater than 38 C (100.4 F)  dextrose Gel 1 Dose(s) Oral once PRN Blood Glucose LESS THAN 70 milliGRAM(s)/deciliter  glucagon  Injectable 1 milliGRAM(s) IntraMuscular once PRN Glucose LESS THAN 70 milligrams/deciliter    	    PHYSICAL EXAM:  T(C): 36.3 (05-03-18 @ 11:43), Max: 37.1 (05-02-18 @ 20:40)  HR: 71 (05-03-18 @ 11:43) (56 - 82)  BP: 107/78 (05-03-18 @ 11:43) (107/78 - 150/78)  RR: 17 (05-03-18 @ 11:43) (17 - 18)  SpO2: 95% (05-03-18 @ 11:43) (95% - 97%)  Wt(kg): --  I&O's Summary    02 May 2018 07:01  -  03 May 2018 07:00  --------------------------------------------------------  IN: 1400 mL / OUT: 900 mL / NET: 500 mL    03 May 2018 07:01  -  03 May 2018 14:21  --------------------------------------------------------  IN: 480 mL / OUT: 0 mL / NET: 480 mL      Appearance: Normal	  HEENT:   NCAT, PERRL, EOMI	  Lymphatic: No lymphadenopathy  Cardiovascular: Normal S1 S2, RRR  Respiratory: Lungs clear to auscultation BL  Psychiatry: A & O x 3, Mood & affect appropriate  Gastrointestinal:  Soft, Non-tender, + BS  Skin: No rashes, No ecchymoses, No cyanosis	  Neurologic: Non-focal  Extremities: R ankle tender, swollen    LABS:	 	    CARDIAC MARKERS:                                14.6   10.94 )-----------( 198      ( 03 May 2018 08:06 )             45.8     05-03    136  |  96  |  52<H>  ----------------------------<  175<H>  3.4<L>   |  28  |  1.73<H>    Ca    9.0      03 May 2018 06:29  Phos  3.8     05-02  Mg     2.2     05-02    TPro  7.3  /  Alb  3.4  /  TBili  0.5  /  DBili  x   /  AST  15  /  ALT  7<L>  /  AlkPhos  71  05-02    proBNP:   Lipid Profile:   HgA1c: Hemoglobin A1C, Whole Blood: 9.9 % (05-03 @ 08:06)    TSH:

## 2018-05-03 NOTE — PHYSICAL THERAPY INITIAL EVALUATION ADULT - ADDITIONAL COMMENTS
Pt resides with spouse and family in private house  which has  3steps to enter HR present .PTA  he was independent in all his ADL's and use cane for ambulation

## 2018-05-03 NOTE — PROGRESS NOTE ADULT - SUBJECTIVE AND OBJECTIVE BOX
CARDIOLOGY FOLLOW UP - Dr. Mazariegos    CC no cp/sob       PHYSICAL EXAM:  T(C): 36.4 (05-03-18 @ 04:54), Max: 37.1 (05-02-18 @ 20:40)  HR: 82 (05-03-18 @ 04:54) (56 - 87)  BP: 120/89 (05-03-18 @ 04:54) (120/89 - 150/78)  RR: 17 (05-03-18 @ 04:54) (17 - 18)  SpO2: 97% (05-03-18 @ 04:54) (96% - 97%)  Wt(kg): --  I&O's Summary    02 May 2018 07:01  -  03 May 2018 07:00  --------------------------------------------------------  IN: 1400 mL / OUT: 900 mL / NET: 500 mL        Appearance: Normal	  Cardiovascular: Normal S1 S2,RRR, No JVD, No murmurs  Respiratory: Lungs clear to auscultation	  Gastrointestinal:  Soft, Non-tender, + BS	  Extremities: Normal range of motion, No clubbing, cyanosis or edema        MEDICATIONS  (STANDING):  aspirin enteric coated 81 milliGRAM(s) Oral daily  ceFAZolin   IVPB 1000 milliGRAM(s) IV Intermittent every 8 hours  dabigatran 150 milliGRAM(s) Oral every 12 hours  dextrose 5%. 1000 milliLiter(s) (50 mL/Hr) IV Continuous <Continuous>  dextrose 50% Injectable 12.5 Gram(s) IV Push once  dextrose 50% Injectable 25 Gram(s) IV Push once  dextrose 50% Injectable 25 Gram(s) IV Push once  furosemide    Tablet 80 milliGRAM(s) Oral daily  insulin lispro (HumaLOG) corrective regimen sliding scale   SubCutaneous three times a day before meals  insulin lispro (HumaLOG) corrective regimen sliding scale   SubCutaneous at bedtime  metoprolol succinate  milliGRAM(s) Oral daily  pregabalin 100 milliGRAM(s) Oral three times a day      TELEMETRY: 	    ECG:  	  RADIOLOGY:   DIAGNOSTIC TESTING:  [ ] Echocardiogram:  [ ]  Catheterization:  [ ] Stress Test:    OTHER: 	    LABS:	 	                                14.6   10.94 )-----------( 198      ( 03 May 2018 08:06 )             45.8     05-03    136  |  96  |  52<H>  ----------------------------<  175<H>  3.4<L>   |  28  |  1.73<H>    Ca    9.0      03 May 2018 06:29  Phos  3.8     05-02  Mg     2.2     05-02    TPro  7.3  /  Alb  3.4  /  TBili  0.5  /  DBili  x   /  AST  15  /  ALT  7<L>  /  AlkPhos  71  05-02    PT/INR - ( 01 May 2018 18:10 )   PT: 15.8 sec;   INR: 1.45 ratio         PTT - ( 01 May 2018 18:10 )  PTT:53.8 sec

## 2018-05-03 NOTE — PROGRESS NOTE ADULT - ASSESSMENT
Imp-  63y Male  s/p ANT on CKD 3  creat below baseline  hypokalemia - supplement  gout vs cellulitis ?  - improved  no objection to dc from renal standpoint

## 2018-05-03 NOTE — PROGRESS NOTE ADULT - ASSESSMENT
63 year old male with PMHx  AFIB, HTN, HLD, PVD, CVA, CAD, NSTEMI s/p PCI to mid LAD, prox LCx, distal LCx, Diastolic Heart failure, DM, severe left internal carotid disease s/p CEA presenting with right ankle pain/ swelling, ?cellulitis/gout.     1. Right ankle pain.  swelling, redness improved   prednisone dc'd   wbc improving,   continue with abx for cellulitis   blood cultures negative   us negative for DVT  ankle xray noted   med f/u   ortho eval     2. CAD s/p PCI  cv stable no chest pain or sob.  no evidence of acute ischemia/ACS    continue ASA    3. Diastolic Heart Failure (hx)  stable   no s/s of fluid overload on exam   continue lasix 80mg daily, bb   no acie/arb secondary to CKD     4. AFIB  stable, rate controlled  continue with BB  CHADS 5 - continue pradaxa     5. HTN  bp stable   continue current medication regimen    6. CKD  stable  renal f/u     DVT ppx   DC planning per primary team

## 2018-05-03 NOTE — PROGRESS NOTE ADULT - ASSESSMENT
62 yo male with PMH Afib on pradaxa, PVD, DM with neuropathy, CKD, CAD s/p MI, CVA, here with right ankle pain and swelling for 3 days.       Problem/Plan - 1:  ·  Problem: Right ankle pain.  Plan: Patient with right ankle swelling, tenderness, warmth likely due to acute gout (elevated uric acid level)  currently improving, c/w abx  refusing any more steroids  pain control PRN  d/c home on PO abx per ID       Problem/Plan - 2:  ·  Problem: CAD (coronary artery disease).  Plan: c/w home med, aspirin, metoprolol.      Problem/Plan - 3:  ·  Problem: Atrial fibrillation.  Plan: c/w pradaxa and metoprolol.      Problem/Plan - 4:  ·  Problem: CKD (chronic kidney disease).  Plan: currently creatinine is better than baseline  will continue to monitor  avoid nephrotoxic agents.     Problem/Plan - 5:  ·  Problem: DM type 2 (diabetes mellitus, type 2).  Plan: check A1c  sliding scale.      Problem/Plan - 6:  Problem: Prophylactic measure. Plan: on pradaxa.

## 2018-05-03 NOTE — PROGRESS NOTE ADULT - ASSESSMENT
Imp: crystal dz vs. cellulitis.    Rx: no id objection to dc planning for today:  keflex 500 mg three times a day for 5 days upon dc home.    id to see prn

## 2018-05-06 LAB
CULTURE RESULTS: SIGNIFICANT CHANGE UP
CULTURE RESULTS: SIGNIFICANT CHANGE UP
SPECIMEN SOURCE: SIGNIFICANT CHANGE UP
SPECIMEN SOURCE: SIGNIFICANT CHANGE UP

## 2018-08-31 NOTE — DIETITIAN INITIAL EVALUATION ADULT. - ETIOLOGY
Spine appears normal no midline tenderness or step offs, movement of extremities grossly intact. lack of willingness to adhere to recommendations excessive energy consumption

## 2018-10-08 NOTE — PROVIDER CONTACT NOTE (OTHER) - BACKGROUND
SOB
61 year old male admitted for reporting 30-40 lbs weight gain shortness of breath.
Pt admitted with CHF exacerbation
Pt admitted with acute on chronic heart failure.
normal (ped)...

## 2018-11-08 NOTE — ED PROVIDER NOTE - NSCAREINITIATED _GEN_ER
Pt is a 16 yo  PPD 2 from vaginal delivery. She was admitted on 2018 for induction of labor. She was managed accordingly. She delivered a male infant.  By PPD 2, she was voiding without difficulty, ambulating well, tolerating a diet, and passing flatus. Her pain is well controlled. H/H is 10.1/31.9. She was desiring discharge home. She was given discharge teaching and instructions prior to leaving the hospital.  Pt voiced understanding of all instructions.     D/C pt. To home in stable condition  Reg diet  Ad zacarias activity with pelvic rest x 6 wks  Call for fever >101, heavy vag bleeding, pain uncontrolled w/ pain meds  F/u in office in 6-8wks  Rx for Motrin and Percocet given    Sumi Nick MD  \[facility] [place] [facility]\"" Family Medicine HO2    
Raj Marks(Resident)

## 2018-12-28 ENCOUNTER — INPATIENT (INPATIENT)
Facility: HOSPITAL | Age: 63
LOS: 3 days | Discharge: HOME | DRG: 291 | End: 2019-01-01
Attending: INTERNAL MEDICINE | Admitting: INTERNAL MEDICINE
Payer: COMMERCIAL

## 2018-12-28 VITALS
DIASTOLIC BLOOD PRESSURE: 78 MMHG | RESPIRATION RATE: 20 BRPM | OXYGEN SATURATION: 97 % | HEART RATE: 96 BPM | TEMPERATURE: 98 F | SYSTOLIC BLOOD PRESSURE: 125 MMHG | WEIGHT: 307.1 LBS | HEIGHT: 71 IN

## 2018-12-28 DIAGNOSIS — Z95.5 PRESENCE OF CORONARY ANGIOPLASTY IMPLANT AND GRAFT: Chronic | ICD-10-CM

## 2018-12-28 DIAGNOSIS — Z98.89 OTHER SPECIFIED POSTPROCEDURAL STATES: Chronic | ICD-10-CM

## 2018-12-28 DIAGNOSIS — R06.02 SHORTNESS OF BREATH: ICD-10-CM

## 2018-12-28 LAB
ALBUMIN SERPL ELPH-MCNC: 3.6 G/DL — SIGNIFICANT CHANGE UP (ref 3.3–5)
ALP SERPL-CCNC: 104 U/L — SIGNIFICANT CHANGE UP (ref 40–120)
ALT FLD-CCNC: 7 U/L — LOW (ref 10–45)
ANION GAP SERPL CALC-SCNC: 12 MMOL/L — SIGNIFICANT CHANGE UP (ref 5–17)
ANION GAP SERPL CALC-SCNC: 13 MMOL/L — SIGNIFICANT CHANGE UP (ref 5–17)
AST SERPL-CCNC: 9 U/L — LOW (ref 10–40)
BASOPHILS # BLD AUTO: 0.1 K/UL — SIGNIFICANT CHANGE UP (ref 0–0.2)
BASOPHILS NFR BLD AUTO: 0.6 % — SIGNIFICANT CHANGE UP (ref 0–2)
BILIRUB SERPL-MCNC: 0.6 MG/DL — SIGNIFICANT CHANGE UP (ref 0.2–1.2)
BUN SERPL-MCNC: 45 MG/DL — HIGH (ref 7–23)
BUN SERPL-MCNC: 45 MG/DL — HIGH (ref 7–23)
CALCIUM SERPL-MCNC: 9 MG/DL — SIGNIFICANT CHANGE UP (ref 8.4–10.5)
CALCIUM SERPL-MCNC: 9.4 MG/DL — SIGNIFICANT CHANGE UP (ref 8.4–10.5)
CHLORIDE SERPL-SCNC: 93 MMOL/L — LOW (ref 96–108)
CHLORIDE SERPL-SCNC: 96 MMOL/L — SIGNIFICANT CHANGE UP (ref 96–108)
CO2 SERPL-SCNC: 30 MMOL/L — SIGNIFICANT CHANGE UP (ref 22–31)
CO2 SERPL-SCNC: 30 MMOL/L — SIGNIFICANT CHANGE UP (ref 22–31)
CREAT SERPL-MCNC: 2 MG/DL — HIGH (ref 0.5–1.3)
CREAT SERPL-MCNC: 2.13 MG/DL — HIGH (ref 0.5–1.3)
EOSINOPHIL # BLD AUTO: 0.1 K/UL — SIGNIFICANT CHANGE UP (ref 0–0.5)
EOSINOPHIL NFR BLD AUTO: 0.9 % — SIGNIFICANT CHANGE UP (ref 0–6)
GLUCOSE BLDC GLUCOMTR-MCNC: 182 MG/DL — HIGH (ref 70–99)
GLUCOSE BLDC GLUCOMTR-MCNC: 231 MG/DL — HIGH (ref 70–99)
GLUCOSE BLDC GLUCOMTR-MCNC: 246 MG/DL — HIGH (ref 70–99)
GLUCOSE SERPL-MCNC: 211 MG/DL — HIGH (ref 70–99)
GLUCOSE SERPL-MCNC: 229 MG/DL — HIGH (ref 70–99)
HCT VFR BLD CALC: 56.8 % — HIGH (ref 39–50)
HGB BLD-MCNC: 16.7 G/DL — SIGNIFICANT CHANGE UP (ref 13–17)
LYMPHOCYTES # BLD AUTO: 1.3 K/UL — SIGNIFICANT CHANGE UP (ref 1–3.3)
LYMPHOCYTES # BLD AUTO: 13.9 % — SIGNIFICANT CHANGE UP (ref 13–44)
MCHC RBC-ENTMCNC: 23.9 PG — LOW (ref 27–34)
MCHC RBC-ENTMCNC: 29.5 GM/DL — LOW (ref 32–36)
MCV RBC AUTO: 81.1 FL — SIGNIFICANT CHANGE UP (ref 80–100)
MONOCYTES # BLD AUTO: 0.9 K/UL — SIGNIFICANT CHANGE UP (ref 0–0.9)
MONOCYTES NFR BLD AUTO: 9.5 % — SIGNIFICANT CHANGE UP (ref 2–14)
NEUTROPHILS # BLD AUTO: 7 K/UL — SIGNIFICANT CHANGE UP (ref 1.8–7.4)
NEUTROPHILS NFR BLD AUTO: 75.2 % — SIGNIFICANT CHANGE UP (ref 43–77)
NT-PROBNP SERPL-SCNC: 2306 PG/ML — HIGH (ref 0–300)
PLATELET # BLD AUTO: 203 K/UL — SIGNIFICANT CHANGE UP (ref 150–400)
POTASSIUM SERPL-MCNC: 3.9 MMOL/L — SIGNIFICANT CHANGE UP (ref 3.5–5.3)
POTASSIUM SERPL-MCNC: 4.3 MMOL/L — SIGNIFICANT CHANGE UP (ref 3.5–5.3)
POTASSIUM SERPL-SCNC: 3.9 MMOL/L — SIGNIFICANT CHANGE UP (ref 3.5–5.3)
POTASSIUM SERPL-SCNC: 4.3 MMOL/L — SIGNIFICANT CHANGE UP (ref 3.5–5.3)
PROT SERPL-MCNC: 7.6 G/DL — SIGNIFICANT CHANGE UP (ref 6–8.3)
RBC # BLD: 7 M/UL — HIGH (ref 4.2–5.8)
RBC # FLD: 15.8 % — HIGH (ref 10.3–14.5)
SODIUM SERPL-SCNC: 136 MMOL/L — SIGNIFICANT CHANGE UP (ref 135–145)
SODIUM SERPL-SCNC: 138 MMOL/L — SIGNIFICANT CHANGE UP (ref 135–145)
TROPONIN T, HIGH SENSITIVITY RESULT: 103 NG/L — HIGH (ref 0–51)
TROPONIN T, HIGH SENSITIVITY RESULT: 96 NG/L — HIGH (ref 0–51)
TSH SERPL-MCNC: 1.71 UIU/ML — SIGNIFICANT CHANGE UP (ref 0.27–4.2)
WBC # BLD: 9.3 K/UL — SIGNIFICANT CHANGE UP (ref 3.8–10.5)
WBC # FLD AUTO: 9.3 K/UL — SIGNIFICANT CHANGE UP (ref 3.8–10.5)

## 2018-12-28 PROCEDURE — 71250 CT THORAX DX C-: CPT | Mod: 26

## 2018-12-28 PROCEDURE — 71045 X-RAY EXAM CHEST 1 VIEW: CPT | Mod: 26

## 2018-12-28 PROCEDURE — 93010 ELECTROCARDIOGRAM REPORT: CPT | Mod: NC

## 2018-12-28 PROCEDURE — 99285 EMERGENCY DEPT VISIT HI MDM: CPT | Mod: 25

## 2018-12-28 RX ORDER — DEXTROSE 50 % IN WATER 50 %
12.5 SYRINGE (ML) INTRAVENOUS ONCE
Qty: 0 | Refills: 0 | Status: DISCONTINUED | OUTPATIENT
Start: 2018-12-28 | End: 2019-01-01

## 2018-12-28 RX ORDER — SODIUM CHLORIDE 9 MG/ML
1000 INJECTION, SOLUTION INTRAVENOUS
Qty: 0 | Refills: 0 | Status: DISCONTINUED | OUTPATIENT
Start: 2018-12-28 | End: 2019-01-01

## 2018-12-28 RX ORDER — FUROSEMIDE 40 MG
80 TABLET ORAL ONCE
Qty: 0 | Refills: 0 | Status: DISCONTINUED | OUTPATIENT
Start: 2018-12-28 | End: 2018-12-28

## 2018-12-28 RX ORDER — DEXTROSE 50 % IN WATER 50 %
25 SYRINGE (ML) INTRAVENOUS ONCE
Qty: 0 | Refills: 0 | Status: DISCONTINUED | OUTPATIENT
Start: 2018-12-28 | End: 2019-01-01

## 2018-12-28 RX ORDER — FUROSEMIDE 40 MG
80 TABLET ORAL DAILY
Qty: 0 | Refills: 0 | Status: DISCONTINUED | OUTPATIENT
Start: 2018-12-28 | End: 2018-12-28

## 2018-12-28 RX ORDER — CEFTRIAXONE 500 MG/1
1 INJECTION, POWDER, FOR SOLUTION INTRAMUSCULAR; INTRAVENOUS EVERY 24 HOURS
Qty: 0 | Refills: 0 | Status: DISCONTINUED | OUTPATIENT
Start: 2018-12-28 | End: 2019-01-01

## 2018-12-28 RX ORDER — FUROSEMIDE 40 MG
80 TABLET ORAL ONCE
Qty: 0 | Refills: 0 | Status: COMPLETED | OUTPATIENT
Start: 2018-12-28 | End: 2018-12-28

## 2018-12-28 RX ORDER — ASPIRIN/CALCIUM CARB/MAGNESIUM 324 MG
81 TABLET ORAL DAILY
Qty: 0 | Refills: 0 | Status: DISCONTINUED | OUTPATIENT
Start: 2018-12-28 | End: 2019-01-01

## 2018-12-28 RX ORDER — METOPROLOL TARTRATE 50 MG
100 TABLET ORAL DAILY
Qty: 0 | Refills: 0 | Status: DISCONTINUED | OUTPATIENT
Start: 2018-12-28 | End: 2018-12-29

## 2018-12-28 RX ORDER — AZITHROMYCIN 500 MG/1
500 TABLET, FILM COATED ORAL EVERY 24 HOURS
Qty: 0 | Refills: 0 | Status: DISCONTINUED | OUTPATIENT
Start: 2018-12-28 | End: 2019-01-01

## 2018-12-28 RX ORDER — DEXTROSE 50 % IN WATER 50 %
15 SYRINGE (ML) INTRAVENOUS ONCE
Qty: 0 | Refills: 0 | Status: DISCONTINUED | OUTPATIENT
Start: 2018-12-28 | End: 2019-01-01

## 2018-12-28 RX ORDER — AZITHROMYCIN 500 MG/1
500 TABLET, FILM COATED ORAL ONCE
Qty: 0 | Refills: 0 | Status: COMPLETED | OUTPATIENT
Start: 2018-12-28 | End: 2018-12-28

## 2018-12-28 RX ORDER — FUROSEMIDE 40 MG
80 TABLET ORAL
Qty: 0 | Refills: 0 | Status: DISCONTINUED | OUTPATIENT
Start: 2018-12-28 | End: 2018-12-29

## 2018-12-28 RX ORDER — GLUCAGON INJECTION, SOLUTION 0.5 MG/.1ML
1 INJECTION, SOLUTION SUBCUTANEOUS ONCE
Qty: 0 | Refills: 0 | Status: DISCONTINUED | OUTPATIENT
Start: 2018-12-28 | End: 2019-01-01

## 2018-12-28 RX ORDER — CEFTRIAXONE 500 MG/1
1 INJECTION, POWDER, FOR SOLUTION INTRAMUSCULAR; INTRAVENOUS ONCE
Qty: 0 | Refills: 0 | Status: COMPLETED | OUTPATIENT
Start: 2018-12-28 | End: 2018-12-28

## 2018-12-28 RX ORDER — ASPIRIN/CALCIUM CARB/MAGNESIUM 324 MG
324 TABLET ORAL ONCE
Qty: 0 | Refills: 0 | Status: COMPLETED | OUTPATIENT
Start: 2018-12-28 | End: 2018-12-28

## 2018-12-28 RX ORDER — DABIGATRAN ETEXILATE MESYLATE 150 MG/1
150 CAPSULE ORAL EVERY 12 HOURS
Qty: 0 | Refills: 0 | Status: DISCONTINUED | OUTPATIENT
Start: 2018-12-28 | End: 2019-01-01

## 2018-12-28 RX ORDER — INSULIN LISPRO 100/ML
VIAL (ML) SUBCUTANEOUS
Qty: 0 | Refills: 0 | Status: DISCONTINUED | OUTPATIENT
Start: 2018-12-28 | End: 2018-12-30

## 2018-12-28 RX ADMIN — Medication 100 MILLIGRAM(S): at 13:06

## 2018-12-28 RX ADMIN — Medication 80 MILLIGRAM(S): at 18:04

## 2018-12-28 RX ADMIN — DABIGATRAN ETEXILATE MESYLATE 150 MILLIGRAM(S): 150 CAPSULE ORAL at 18:05

## 2018-12-28 RX ADMIN — Medication 100 MILLIGRAM(S): at 18:08

## 2018-12-28 RX ADMIN — Medication 324 MILLIGRAM(S): at 11:20

## 2018-12-28 RX ADMIN — Medication 100 MILLIGRAM(S): at 18:05

## 2018-12-28 RX ADMIN — AZITHROMYCIN 250 MILLIGRAM(S): 500 TABLET, FILM COATED ORAL at 13:07

## 2018-12-28 RX ADMIN — CEFTRIAXONE 100 GRAM(S): 500 INJECTION, POWDER, FOR SOLUTION INTRAMUSCULAR; INTRAVENOUS at 15:29

## 2018-12-28 RX ADMIN — Medication 2: at 18:36

## 2018-12-28 RX ADMIN — Medication 80 MILLIGRAM(S): at 11:20

## 2018-12-28 NOTE — H&P ADULT - ASSESSMENT
63 year old male with PMHx  AFIB, HTN, HLD, PVD, CVA, CKD, CAD, NSTEMI s/p PCI to mid LAD, prox LCx, distal LCx, Diastolic Heart failure, DM, severe left internal carotid disease s/p CEA presenting with Acute/chronic diastolic CHF.     1. Acute/ Chronic Diastolic CHF   CT chest revealing small R, trace L pleural effusions, Pbnp elevated   start lasix 80mg IVP BID  as per cards  , monitor creat    continue bb, no no acie/arb secondary to CKD   check echo to re-evaluate LV function, valvular disease     2. PNA ?  on IV abx   infectious w/u in progress  , pulm f/u     3. CAD s/p PCI  cv stable   no evidence of acute ischemia/ACS    continue ASA    4. AFIB  stable, rate controlled  continue with BB  CHADS 5 - continue pradaxa     5. HTN  bp stable   continue current medication regimen      dvt ppx

## 2018-12-28 NOTE — CONSULT NOTE ADULT - ASSESSMENT
IMP: 62 yo male with increase in cough and SOB x 4 days; bilat infiltrates  and effusions on chest CT; CAP with CHF  Patient's complaints for last month consistent with orthopnea.    PLAN: Antibx as ordered for CAP  Trend pro BNP  Optimize cardiac function  F/u labs    Thank you,  Will follow,     Jose Mclean MD [facility]  [phone]  (Vinay/Ramin/Cuong)

## 2018-12-28 NOTE — CONSULT NOTE ADULT - ASSESSMENT
63 year old male with PMHx  AFIB, HTN, HLD, PVD, CVA, CAD, NSTEMI s/p PCI to mid LAD, prox LCx, distal LCx, Diastolic Heart failure, DM, severe left internal carotid disease s/p CEA presenting with Acute/chronic diastolic CHF.     1. Acute/ Chronic Diastolic CHF   +fluid overload on exam, improved s/p IV lasix in ED  CT chest revealing small R, trace L pleural effusions, Pbnp elevated   start lasix 80mg IVP BID , monitor creat   continue bb, no no acie/arb secondary to CKD   check echo to re-evaluate LV function, valvular disease     2. PNA   on IV abx   infectious w/u in progress  med f/u , pulm f/u     3. CAD s/p PCI  cv stable no chest pain or sob.  no evidence of acute ischemia/ACS    continue ASA    4. AFIB  stable, rate controlled  continue with BB  CHADS 5 - continue pradaxa     5. HTN  bp stable   continue current medication regimen    dvt ppx 63 year old male with PMHx  AFIB, HTN, HLD, PVD, CVA, CKD, CAD, NSTEMI s/p PCI to mid LAD, prox LCx, distal LCx, Diastolic Heart failure, DM, severe left internal carotid disease s/p CEA presenting with Acute/chronic diastolic CHF.     1. Acute/ Chronic Diastolic CHF   in setting of recent tapering of diuretics   +fluid overload on exam, improved s/p IV lasix in ED  CT chest revealing small R, trace L pleural effusions, Pbnp elevated   start lasix 80mg IVP BID , monitor creat   continue bb, no no acie/arb secondary to CKD   check echo to re-evaluate LV function, valvular disease     2. PNA   on IV abx   infectious w/u in progress  med f/u , pulm f/u     3. CAD s/p PCI  cv stable no chest pain or sob.  no evidence of acute ischemia/ACS    continue ASA    4. AFIB  stable, rate controlled  continue with BB  CHADS 5 - continue pradaxa     5. HTN  bp stable   continue current medication regimen    6. CKD/ANT  sec to hf    dvt ppx

## 2018-12-28 NOTE — ED ADULT NURSE REASSESSMENT NOTE - NS ED NURSE REASSESS COMMENT FT1
Received report from previous shift RN. Patient reports SOB x last month, worsening over past 4 days. Patient resting comfortably in bed, denies current SOB or CP. Patient reporting nonproductive cough and denies current n/v/d, fever, chills. Patient well appearing with no acute distress noted, able to speak in full sentences without difficulty. VSS

## 2018-12-28 NOTE — ED ADULT NURSE NOTE - NSIMPLEMENTINTERV_GEN_ALL_ED
Implemented All Fall with Harm Risk Interventions:  [place] to call system. Call bell, personal items and telephone within reach. Instruct patient to call for assistance. Room bathroom lighting operational. Non-slip footwear when patient is off stretcher. Physically safe environment: no spills, clutter or unnecessary equipment. Stretcher in lowest position, wheels locked, appropriate side rails in place. Provide visual cue, wrist band, yellow gown, etc. Monitor gait and stability. Monitor for mental status changes and reorient to person, place, and time. Review medications for side effects contributing to fall risk. Reinforce activity limits and safety measures with patient and family. Provide visual clues: red socks.

## 2018-12-28 NOTE — CONSULT NOTE ADULT - SUBJECTIVE AND OBJECTIVE BOX
Seen in ER        CHIEF COMPLAINT: SOB with cough     HPI: 62 yo male presents complaining of 4 day hx of mostly dry cough with increasing SOB; seen in urgent care center; told of "pneumonia"; referred to ER  He denies fever, chest pain , hemoptysis ,n/v, or diarrhea.  He has been complaining of increasing SOB when laying down for one month for which he took an "extra pill" (lasix)  The patient has significant CAD with multiple stents, last ~ 4 years ago     PAST MEDICAL & SURGICAL HISTORY:  CAD (coronary artery disease)  MI (myocardial infarction): circa 2014  Atrial fibrillation  TIA (transient ischemic attack)  DM type 2 (diabetes mellitus, type 2)  HLD (hyperlipidemia)  HTN (hypertension), benign  S/P carotid endarterectomy: left  Status post angioplasty with stent: LULU x 3 2/7/2014      FAMILY HISTORY:  Family history of heart disease (Father)      SOCIAL HISTORY: Never smoker    No Known Allergies      T(C): 36.7 (28 Dec 2018 11:18), Max: 36.7 (28 Dec 2018 11:18)  T(F): 98 (28 Dec 2018 11:18), Max: 98 (28 Dec 2018 11:18)  HR: 104 (28 Dec 2018 11:18) (96 - 104)  BP: 125/66 (28 Dec 2018 11:18) (102/69 - 125/78)  BP(mean): 83 (28 Dec 2018 11:18) (83 - 83)  RR: 17 (28 Dec 2018 11:18) (17 - 24)  SpO2: 96% (28 Dec 2018 11:18) (94% - 97%)        PHYSICAL EXAM:  General: A&O x 3 NAD in bed  Neck: large neck. Difficult to assess JVD  Respiratory: Decreased BS both bases R>L. Bibasilar crackles L>R  Cardiovascular: Irr R&R  Abdomen: Obese Non tender  nl BS  Extremities: chronic stasis changes. No edema  Neurological: no focal findings      HOSPITAL MEDICATIONS:  MEDICATIONS  (STANDING):  aspirin enteric coated 81 milliGRAM(s) Oral daily  azithromycin  IVPB 500 milliGRAM(s) IV Intermittent Once  cefTRIAXone   IVPB 1 Gram(s) IV Intermittent Once  dabigatran 150 milliGRAM(s) Oral every 12 hours  dextrose 5%. 1000 milliLiter(s) (50 mL/Hr) IV Continuous <Continuous>  dextrose 50% Injectable 12.5 Gram(s) IV Push once  dextrose 50% Injectable 25 Gram(s) IV Push once  dextrose 50% Injectable 25 Gram(s) IV Push once  furosemide   Injectable 80 milliGRAM(s) IV Push daily  insulin lispro (HumaLOG) corrective regimen sliding scale   SubCutaneous three times a day before meals  metoprolol succinate  milliGRAM(s) Oral daily  pregabalin 100 milliGRAM(s) Oral two times a day    MEDICATIONS  (PRN):  dextrose 40% Gel 15 Gram(s) Oral once PRN Blood Glucose LESS THAN 70 milliGRAM(s)/deciliter  glucagon  Injectable 1 milliGRAM(s) IntraMuscular once PRN Glucose LESS THAN 70 milligrams/deciliter      LABS:                        16.7   9.3   )-----------( 203      ( 28 Dec 2018 11:15 )             56.8     Hgb Trend: 16.7<--  12-28    136  |  93<L>  |  45<H>  ----------------------------<  229<H>  4.3   |  30  |  2.13<H>    Ca    9.4      28 Dec 2018 11:15    TPro  7.6  /  Alb  3.6  /  TBili  0.6  /  DBili  x   /  AST  9<L>  /  ALT  7<L>  /  AlkPhos  104  12-28    Creatinine Trend: 2.13<--    RADIOLOGY:   Chest xray (Reviewed) no gross infiltrate    CT chest (non contrast; just performed) Bilat effusions R>L with bilat lower lobe infiltrates          EKG: A fib ~ 110 /m No acute changes

## 2018-12-28 NOTE — ED PROVIDER NOTE - ATTENDING CONTRIBUTION TO CARE
I have seen and evaluated this patient with the resident.   I agree with the findings  unless other wise stated.  I have made appropriate changes in documentations where needed, After my face to face bedside evaluation, I am further  notinM hx of extensive cards hx s/p stents chf DM HTN afib TIA CAD s/p carotid enterectomy presents with a cc of SOB x4 days, unable to walk 2/2 FAYE, worse when lying down at night, Orthopnea +  no chest pain, no fevers, sick contacts at home, also notes worsening LE edema over past few days. exam vss well appearing non toxic rales on exam LE edema c/f fluid overload 2/2 chf vs pna vs uri vs acs will get labs cardiacs cxr ekg will  admit -- [place]

## 2018-12-28 NOTE — H&P ADULT - NSHPLABSRESULTS_GEN_ALL_CORE
16.7   9.3   )-----------( 203      ( 28 Dec 2018 11:15 )             56.8       12-28    138  |  96  |  45<H>  ----------------------------<  211<H>  3.9   |  30  |  2.00<H>    Ca    9.0      28 Dec 2018 12:58    TPro  7.6  /  Alb  3.6  /  TBili  0.6  /  DBili  x   /  AST  9<L>  /  ALT  7<L>  /  AlkPhos  104  12-28                      Lactate Trend            CAPILLARY BLOOD GLUCOSE      POCT Blood Glucose.: 182 mg/dL (28 Dec 2018 17:54)

## 2018-12-28 NOTE — CONSULT NOTE ADULT - SUBJECTIVE AND OBJECTIVE BOX
CARDIOLOGY CONSULT - Dr. Mazariegos     CHIEF COMPLAINT: SOB, LE swelling     HPI:  63M with PMHx  AFIB, HTN, HLD, PVD, CVA, CAD, NSTEMI s/p PCI to mid LAD, prox LCx, distal LCx, Diastolic Heart failure, DM, severe left internal carotid disease s/p CEA presenting with worsening FAYE, LE edema, orthopnea increasing over the past week. Pt. also endorses a 17lb weight gain over the last month and +cough. Pt. denies cp, palpitations, fever, chills, nausea, vomiting, diarrhea, recent sick contacts. Pt. well known to our practice recently seen in our office 2 months ago. Pt. currently states he is on 80mg lasix po at home, reports compliance with medications and no change in diet.       PAST MEDICAL & SURGICAL HISTORY:  CAD (coronary artery disease)  MI (myocardial infarction): circa   Atrial fibrillation  TIA (transient ischemic attack)  DM type 2 (diabetes mellitus, type 2)  HLD (hyperlipidemia)  HTN (hypertension), benign  S/P carotid endarterectomy: left  Status post angioplasty with stent: LULU x 3 2014          PREVIOUS DIAGNOSTIC TESTING:    [ ] Echocardiogram: < from: Transthoracic Echocardiogram (17 @ 22:44) >  Conclusions:  1. Normal left ventricular systolic function.   Endocardial  visualization enhanced with intravenous injection of echo  contrast (Definity).  *** Compared with echocardiogram of 2016, no  significant changes noted.    < end of copied text >    [ ]  Catheterization: < from: Cardiac Cath Lab (14 @ 08:52) >    [facility]  Department of Cardiology  [address] [place] [address]  [phone]  Cath Lab Report -- Comprehensive Report  Patient: DYLAN DEL CASTILLO  Study date: 2014  Account number: [id number]  MR number: [id number]  : 1955  Gender: Male  Race: W  Case Physician(s):  Colby Mazariegos M.D.  Fellow:  Fco Ledesma MD  Referring Physician:  Wicho Gallegos M.D.  INDICATIONS: Angina/MI: myocardial infarction without ST elevation  (NSTEMI), CCS class III, with onset 2-7 days prior to admission.  HISTORY: The patient has hypertension, insulin-controlled diabetes,  medication-treated dyslipidemia, morbid obesity, and a family history of  coronary artery disease. PRIOR CARDIOVASCULAR PROCEDURES: Stent of the mid  LAD.  PROCEDURE:  --  Intervention on distal circumflex: drug-eluting stent.  --  Intervention on proximal circumflex: drug-eluting stent.  TECHNIQUE: The risks and alternatives of the procedures and conscious  sedation were explained to the patient and informed consent was obtained.  Cardiac catheterization performed electively.  Local anesthetic given. Right radial artery access. RADIATION EXPOSURE: 16  min. A drug-eluting stent was performed on the 80 % lesion in the distal  circumflex. Following intervention there was a 1 % residual stenosis.  Vessel setup was performed. A 6FR EBU 3.5 LAUNCHER guiding catheter was  used to intubate the vessel. Vessel setup was performed. A BMW UNIVERSAL  190CM wire was used to cross the lesion. Balloon angioplasty was  performed, using a 2.5 X 20 MAVERICK RX balloon, with 3 inflations and a  maximum inflation pressure of 12 tom. A 2.50 X 30 RESOLUTE drug-eluting  stent was placed across the lesion and deployed at a maximum inflation  pressure of 16 tom. Balloon angioplasty was performed, with 1 inflations  and a maximum inflation pressure of 16 tom. A drug-eluting stent was  performed on the 80 % lesion in the proximal circumflex. Following  intervention there was a 1 % residual stenosis. Vessel setup was  performed. A 6FR EBU 3.5 LAUNCHER guiding catheter was used to intubate  the vessel. Vessel setup was performed. A BMW UNIVERSAL 190CM wire was  used to cross the lesion. A 3.50 X 15 RESOLUTE drug-eluting stent was  placed across the lesion and deployed at a maximum inflation pressure of  18 tom. Balloon angioplasty was performed, using a 4.0 X 12 NC SPRINTER  balloon, with 2 inflations and a maximum inflation pressure of 22 tom.  CONTRAST GIVEN: Omnipaque 189 ml.  MEDICATIONS GIVEN: Fentanyl, 25 mcg, IV. Midazolam, 2 mg, IV. Midazolam, 1  mg, IV. Verapamil (Isoptin, Calan, Covera), 2 mg, IA. Nitroglycerin, 100  mcg, intracoronary. Heparin, 3000 units, IA. Heparin, 5000 units, IV.  VENTRICLES: No left ventriculogram was performed.  CORONARY VESSELS:  CX:   --  Proximal circumflex: There was a 80 % stenosis.  --  Distal circumflex: There was a 80 % stenosis.  COMPLICATIONS: There were no complications.  DIAGNOSTIC RECOMMENDATIONS: Severe LAD and LCx disease. PCI of proximal and  distal LCx with LULU in the setting of angina/NSTEMI. Patients remains in  sinus rhythm. Continue ASA 81 and plavix daily. Start Pradaxa 150mg BID  for AF CVA ppx after band pull. Will consider stopping one antiplatelet in  4-6 weeks in light of his A/C need for atrial fibrillation. Evaluation in  progress for internal carotid artery intervention. Would wait 4-6 weeks if  clinically feasible in light of recent MI and stenting.  INTERVENTIONAL RECOMMENDATIONS: Severe LAD and LCx disease. PCI of proximal  and distal LCx with LULU in the setting of angina/NSTEMI. Patients remains  in sinus rhythm. Continue ASA 81 and plavix daily. Start Pradaxa 150mg BID  for AF CVA ppx after band pull. Will consider stopping one antiplatelet in  4-6 weeks in light of his A/C need for atrial fibrillation. Evaluation in  progress for internal carotid artery intervention. Would wait 4-6 weeks if  clinically feasible in light of recent MI and stenting.  Prepared and signed by  Colby Mazariegos M.D.    < end of copied text >    [ ] Stress Test:  	    MEDICATIONS:  MEDICATIONS  (STANDING):  aspirin enteric coated 81 milliGRAM(s) Oral daily  cefTRIAXone   IVPB 1 Gram(s) IV Intermittent Once  dabigatran 150 milliGRAM(s) Oral every 12 hours  dextrose 5%. 1000 milliLiter(s) (50 mL/Hr) IV Continuous <Continuous>  dextrose 50% Injectable 12.5 Gram(s) IV Push once  dextrose 50% Injectable 25 Gram(s) IV Push once  dextrose 50% Injectable 25 Gram(s) IV Push once  furosemide   Injectable 80 milliGRAM(s) IV Push two times a day  insulin lispro (HumaLOG) corrective regimen sliding scale   SubCutaneous three times a day before meals  metoprolol succinate  milliGRAM(s) Oral daily  pregabalin 100 milliGRAM(s) Oral two times a day      FAMILY HISTORY:  Family history of heart disease      SOCIAL HISTORY:    [x] Non-smoker  [ ] Smoker  [ ] Alcohol    Allergies    No Known Allergies    Intolerances    	    REVIEW OF SYSTEMS:  CONSTITUTIONAL: No fever, weight loss, or fatigue  EYES: No eye pain, visual disturbances, or discharge  ENMT:  No difficulty hearing, tinnitus, vertigo; No sinus or throat pain  NECK: No pain or stiffness  RESPIRATORY: +cough, wheezing, chills or hemoptysis; +Shortness of Breath, +FAYE   CARDIOVASCULAR: No chest pain, palpitations, passing out, dizziness, + leg swelling  GASTROINTESTINAL: No abdominal or epigastric pain. No nausea, vomiting, or hematemesis; No diarrhea or constipation. No melena or hematochezia.  GENITOURINARY: No dysuria, frequency, hematuria, or incontinence  NEUROLOGICAL: No headaches, memory loss, loss of strength, numbness, or tremors  SKIN: No itching, burning, rashes, or lesions   	    [X] All others negative	  [ ] Unable to obtain    PHYSICAL EXAM:  T(C): 36.3 (18 @ 14:24), Max: 36.7 (18 @ 11:18)  HR: 90 (18 @ 14:24) (90 - 104)  BP: 111/76 (18 @ 14:24) (102/69 - 133/82)  RR: 18 (18 @ 14:24) (16 - 24)  SpO2: 96% (18 @ 14:24) (94% - 99%)  Wt(kg): --  I&O's Summary      Appearance: Normal	  Psychiatry: A & O x 3, Mood & affect appropriate  HEENT:   Normal oral mucosa, PERRL, EOMI	  Lymphatic: No lymphadenopathy  Cardiovascular: Normal S1 S2,IRREG, No JVD, No murmurs  Respiratory: Lungs clear to auscultation	  Gastrointestinal:  Soft, Non-tender, + BS	  Skin: No rashes, No ecchymoses, No cyanosis	  Neurologic: Non-focal  Extremities: Normal range of motion, No clubbing, B/L le +2 edema   Vascular: Peripheral pulses palpable 2+ bilaterally    TELEMETRY: 	    ECG:  	AFIB 96  RADIOLOGY:   OTHER: 	  	< from: CT Chest No Cont (18 @ 12:22) >    IMPRESSION: Small right and trace left pleural effusions.    < end of copied text >    LABS:	 	    CARDIAC MARKERS:                                  16.7   9.3   )-----------( 203      ( 28 Dec 2018 11:15 )             56.8         138  |  96  |  45<H>  ----------------------------<  211<H>  3.9   |  30  |  2.00<H>    Ca    9.0      28 Dec 2018 12:58    TPro  7.6  /  Alb  3.6  /  TBili  0.6  /  DBili  x   /  AST  9<L>  /  ALT  7<L>  /  AlkPhos  104        proBNP: Serum Pro-Brain Natriuretic Peptide: 2306 pg/mL ( @ 11:15)    Lipid Profile:   HgA1c:   TSH: CARDIOLOGY CONSULT - Dr. Mazariegos     CHIEF COMPLAINT: SOB, LE swelling     HPI:  63M with PMHx  AFIB, HTN, HLD, PVD, CVA, CAD, NSTEMI s/p PCI to mid LAD, prox LCx, distal LCx, Diastolic Heart failure, DM, severe left internal carotid disease s/p CEA presenting with worsening FAYE, LE edema, orthopnea increasing over the past week. Pt. also endorses a 17lb weight gain over the last month and +cough. Pt. denies cp, palpitations, fever, chills, nausea, vomiting, diarrhea, recent sick contacts. Pt. well known to our practice recently seen in our office 2 months ago. Pt. currently states he is on 80mg lasix po at home, reports compliance with medications and no change in diet.     patient had lasix decreased by renal to 40 daily with metol only 1 x weekly about 1 mo ago  just recently started back with inc lasix dose with no response    PAST MEDICAL & SURGICAL HISTORY:  CAD (coronary artery disease)  MI (myocardial infarction): circa   Atrial fibrillation  TIA (transient ischemic attack)  DM type 2 (diabetes mellitus, type 2)  HLD (hyperlipidemia)  HTN (hypertension), benign  S/P carotid endarterectomy: left  Status post angioplasty with stent: LULU x 3 2014          PREVIOUS DIAGNOSTIC TESTING:    [ ] Echocardiogram: < from: Transthoracic Echocardiogram (17 @ 22:44) >  Conclusions:  1. Normal left ventricular systolic function.   Endocardial  visualization enhanced with intravenous injection of echo  contrast (Definity).  *** Compared with echocardiogram of 2016, no  significant changes noted.    < end of copied text >    [ ]  Catheterization: < from: Cardiac Cath Lab (14 @ 08:52) >    [facility]  Department of Cardiology  [address] [place] [address]  [phone]  Cath Lab Report -- Comprehensive Report  Patient: DYLAN DEL CASTILLO  Study date: 2014  Account number: [id number]  MR number: [id number]  : 1955  Gender: Male  Race: W  Case Physician(s):  Colby Mazariegos M.D.  Fellow:  Fco Ledesma MD  Referring Physician:  Wicho Gallegos M.D.  INDICATIONS: Angina/MI: myocardial infarction without ST elevation  (NSTEMI), CCS class III, with onset 2-7 days prior to admission.  HISTORY: The patient has hypertension, insulin-controlled diabetes,  medication-treated dyslipidemia, morbid obesity, and a family history of  coronary artery disease. PRIOR CARDIOVASCULAR PROCEDURES: Stent of the mid  LAD.  PROCEDURE:  --  Intervention on distal circumflex: drug-eluting stent.  --  Intervention on proximal circumflex: drug-eluting stent.  TECHNIQUE: The risks and alternatives of the procedures and conscious  sedation were explained to the patient and informed consent was obtained.  Cardiac catheterization performed electively.  Local anesthetic given. Right radial artery access. RADIATION EXPOSURE: 16  min. A drug-eluting stent was performed on the 80 % lesion in the distal  circumflex. Following intervention there was a 1 % residual stenosis.  Vessel setup was performed. A 6FR EBU 3.5 LAUNCHER guiding catheter was  used to intubate the vessel. Vessel setup was performed. A BMW UNIVERSAL  190CM wire was used to cross the lesion. Balloon angioplasty was  performed, using a 2.5 X 20 MAVERICK RX balloon, with 3 inflations and a  maximum inflation pressure of 12 tom. A 2.50 X 30 RESOLUTE drug-eluting  stent was placed across the lesion and deployed at a maximum inflation  pressure of 16 tom. Balloon angioplasty was performed, with 1 inflations  and a maximum inflation pressure of 16 tom. A drug-eluting stent was  performed on the 80 % lesion in the proximal circumflex. Following  intervention there was a 1 % residual stenosis. Vessel setup was  performed. A 6FR EBU 3.5 LAUNCHER guiding catheter was used to intubate  the vessel. Vessel setup was performed. A BMW UNIVERSAL 190CM wire was  used to cross the lesion. A 3.50 X 15 RESOLUTE drug-eluting stent was  placed across the lesion and deployed at a maximum inflation pressure of  18 tom. Balloon angioplasty was performed, using a 4.0 X 12 NC SPRINTER  balloon, with 2 inflations and a maximum inflation pressure of 22 tom.  CONTRAST GIVEN: Omnipaque 189 ml.  MEDICATIONS GIVEN: Fentanyl, 25 mcg, IV. Midazolam, 2 mg, IV. Midazolam, 1  mg, IV. Verapamil (Isoptin, Calan, Covera), 2 mg, IA. Nitroglycerin, 100  mcg, intracoronary. Heparin, 3000 units, IA. Heparin, 5000 units, IV.  VENTRICLES: No left ventriculogram was performed.  CORONARY VESSELS:  CX:   --  Proximal circumflex: There was a 80 % stenosis.  --  Distal circumflex: There was a 80 % stenosis.  COMPLICATIONS: There were no complications.  DIAGNOSTIC RECOMMENDATIONS: Severe LAD and LCx disease. PCI of proximal and  distal LCx with LULU in the setting of angina/NSTEMI. Patients remains in  sinus rhythm. Continue ASA 81 and plavix daily. Start Pradaxa 150mg BID  for AF CVA ppx after band pull. Will consider stopping one antiplatelet in  4-6 weeks in light of his A/C need for atrial fibrillation. Evaluation in  progress for internal carotid artery intervention. Would wait 4-6 weeks if  clinically feasible in light of recent MI and stenting.  INTERVENTIONAL RECOMMENDATIONS: Severe LAD and LCx disease. PCI of proximal  and distal LCx with LULU in the setting of angina/NSTEMI. Patients remains  in sinus rhythm. Continue ASA 81 and plavix daily. Start Pradaxa 150mg BID  for AF CVA ppx after band pull. Will consider stopping one antiplatelet in  4-6 weeks in light of his A/C need for atrial fibrillation. Evaluation in  progress for internal carotid artery intervention. Would wait 4-6 weeks if  clinically feasible in light of recent MI and stenting.  Prepared and signed by  Colby Mazariegos M.D.    < end of copied text >    [ ] Stress Test:  	    MEDICATIONS:  MEDICATIONS  (STANDING):  aspirin enteric coated 81 milliGRAM(s) Oral daily  cefTRIAXone   IVPB 1 Gram(s) IV Intermittent Once  dabigatran 150 milliGRAM(s) Oral every 12 hours  dextrose 5%. 1000 milliLiter(s) (50 mL/Hr) IV Continuous <Continuous>  dextrose 50% Injectable 12.5 Gram(s) IV Push once  dextrose 50% Injectable 25 Gram(s) IV Push once  dextrose 50% Injectable 25 Gram(s) IV Push once  furosemide   Injectable 80 milliGRAM(s) IV Push two times a day  insulin lispro (HumaLOG) corrective regimen sliding scale   SubCutaneous three times a day before meals  metoprolol succinate  milliGRAM(s) Oral daily  pregabalin 100 milliGRAM(s) Oral two times a day      FAMILY HISTORY:  Family history of heart disease      SOCIAL HISTORY:    [x] Non-smoker  [ ] Smoker  [ ] Alcohol    Allergies    No Known Allergies    Intolerances    	    REVIEW OF SYSTEMS:  CONSTITUTIONAL: No fever, weight loss, or fatigue  EYES: No eye pain, visual disturbances, or discharge  ENMT:  No difficulty hearing, tinnitus, vertigo; No sinus or throat pain  NECK: No pain or stiffness  RESPIRATORY: +cough, wheezing, chills or hemoptysis; +Shortness of Breath, +FAYE   CARDIOVASCULAR: No chest pain, palpitations, passing out, dizziness, + leg swelling  GASTROINTESTINAL: No abdominal or epigastric pain. No nausea, vomiting, or hematemesis; No diarrhea or constipation. No melena or hematochezia.  GENITOURINARY: No dysuria, frequency, hematuria, or incontinence  NEUROLOGICAL: No headaches, memory loss, loss of strength, numbness, or tremors  SKIN: No itching, burning, rashes, or lesions   	    [X] All others negative	  [ ] Unable to obtain    PHYSICAL EXAM:  T(C): 36.3 (18 @ 14:24), Max: 36.7 (18 @ 11:18)  HR: 90 (18 @ 14:24) (90 - 104)  BP: 111/76 (18 @ 14:24) (102/69 - 133/82)  RR: 18 (18 @ 14:24) (16 - 24)  SpO2: 96% (18 @ 14:24) (94% - 99%)  Wt(kg): --  I&O's Summary      Appearance: Normal	  Psychiatry: A & O x 3, Mood & affect appropriate  HEENT:   Normal oral mucosa, PERRL, EOMI	  Lymphatic: No lymphadenopathy  Cardiovascular: Normal S1 S2,IRREG, No JVD, No murmurs  Respiratory: Lungs clear to auscultation	  Gastrointestinal:  Soft, Non-tender, + BS	  Skin: No rashes, No ecchymoses, No cyanosis	  Neurologic: Non-focal  Extremities: Normal range of motion, No clubbing, B/L le +2 edema   Vascular: Peripheral pulses palpable 2+ bilaterally    TELEMETRY: 	    ECG:  	AFIB 96  RADIOLOGY:   OTHER: 	  	< from: CT Chest No Cont (18 @ 12:22) >    IMPRESSION: Small right and trace left pleural effusions.    < end of copied text >    LABS:	 	    CARDIAC MARKERS:                                  16.7   9.3   )-----------( 203      ( 28 Dec 2018 11:15 )             56.8         138  |  96  |  45<H>  ----------------------------<  211<H>  3.9   |  30  |  2.00<H>    Ca    9.0      28 Dec 2018 12:58    TPro  7.6  /  Alb  3.6  /  TBili  0.6  /  DBili  x   /  AST  9<L>  /  ALT  7<L>  /  AlkPhos  104        proBNP: Serum Pro-Brain Natriuretic Peptide: 2306 pg/mL ( @ 11:15)    Lipid Profile:   HgA1c:   TSH:

## 2018-12-28 NOTE — ED PROVIDER NOTE - PHYSICAL EXAMINATION
GEN APPEARANCE: WDWN, alert and cooperative, non-toxic appearing and in NAD  HEAD: Atraumatic, normocephalic   EYES: PERRLa, EOMI, vision grossly intact.   EARS: Gross hearing intact.   NOSE: No nasal discharge, no external evidence of epistaxis.   THROAT: MMM. Oral cavity and pharynx normal. No inflammation, no swelling, no exudate, no oral lesions.  NECK: Supple  CV: RRR, S1S2, no c/r/m/g. No cyanosis or pallor. Extremities warm, well perfused. Cap refill <2 seconds. No bruits.   LUNGS: No wheezing. + b/l rales. No rhonchi. No diminished breath sounds.   ABDOMEN: Soft, NTND. No guarding or rebound. No masses.   MSK: Spine appears normal, no spine point tenderness. No CVA ttp. No joint erythema or tenderness. Normal muscular development. Pelvis stable.  EXTREMITIES: b/l +4 peripheral edema. No obvious joint or bony deformity.  NEURO: Alert, follows commands. Weight bearing normal. Speech normal. Sensation and motor normal x4 extremities.   SKIN: Normal color for race, warm, dry and intact. No evidence of rash.  PSYCH: Normal mood and affect.

## 2018-12-28 NOTE — ED ADULT TRIAGE NOTE - NSWEIGHTCALCTOOLDRUG_GEN_A_CORE
Caller would like to discuss an/a Appointment for an IUD insertion. Writer advised caller of callback within 24-72 hours.    Patient Name: Dutch Robert  Caller Name: Patient  Name of Facility: n/a  Fax Number: n/a  Callback Number:     Telephone Information:   Mobile [phone]       Additional Info: patient would like to schedule an IUD placement. Patient is looking to schedule either Monday or Friday of this next week if possible. Please advise.       Thank you,  Dinorah Collins    
Left message to call back. RN who speaks to pt can offer the appt held for 2/14 at 3:00 with Hodan Land CNM.  
Patient called back regarding encounter below. Patient started her menstrual cycle 02/05. Patient would like to schedule procedure for IUD. Please advise.  
Patient called back regarding encounter below. Please advise.  
RN called pt and asked when pt's first day of last period was. Pt states not sure, thinks it was the middle of last month. RN reinforced need to do IUD insertion within the first 7-10 days of period to ensure no chance of pregnancy, pt should call on first day of next period. Pt replied \"but that's the thing, my period changes every month so how am I supposed to know when to call\". RN reinforced to call clinic on the first day of her next period. Pt concerned about her work schedule and the fact we are not open on weekends. RN explained if the first day of period is a Saturday or Sunday, pt should call right away that Monday. RN reinforced as long as pt calls right away we will find an appt for pt within the first 7-10 days. Pt verb understanding, nothing further at this time.  
RN offered patient the held spot on 2/14/17 at 1500 with Celio. Patient is going to check her work schedule and call back. RN informed patient we will hold the spot until the morning of 2/9/17, but then it will be opened up. Patient verbalizes understanding and states she will call back tomorrow.  
 used

## 2018-12-28 NOTE — ED PROVIDER NOTE - CHIEF COMPLAINT
The patient is a 63y Male complaining of The patient is a 63y Male complaining of shortness of breath

## 2018-12-28 NOTE — CONSULT NOTE ADULT - ATTENDING COMMENTS
Patient seen and examined.  Agree with above NP note.  patient with history of CAD, s/p PCI, PAF, chronic don HF, obesity, CKD, adm with inc sob, orthopnea    1. Acute on chronic don hf  almost 20 lb wt gain due to dietary indiscretions and dec diuretic regimen over past mo  iv lasix bid  metolazone 5mg daily for 3 days   then 2-3 x weekly  check echo     2. Dyspnea  sec to acute HF, opna less likely   ct chest ok  abx per med    3. cont a/c for paf, trend bmp/renal fxn

## 2018-12-28 NOTE — ED PROVIDER NOTE - OBJECTIVE STATEMENT
63M hx of extensive cards hx s/p stents chf DM HTN afib TIA CAD s/p carotid enterectomy presents with a cc of SOB x4 days, unable to walk 2/2 FAYE, worse when lying down at night, no chest pain, no fevers, sick contacts at home, also notes worsening LE edema over past few days. Denies n/v/f/c/cp/. Denies headache, syncope, lightheadedness, dizziness. Denies chest palpitations, abdominal pain. Denies dysuria, hematuria, hematochezia, BRBPR, tarry stools, diarrhea, constipation.

## 2018-12-28 NOTE — H&P ADULT - HISTORY OF PRESENT ILLNESS
63Male  with PMHx  AFIB, HTN, HLD, PVD, CVA, CAD, NSTEMI s/p PCI to mid LAD, prox LCx, distal LCx, Diastolic Heart failure, DM, severe left internal carotid disease s/p CEA presenting with worsening FAYE, LE edema, orthopnea increasing over the past week.   Pt. also endorses over 15 lb weight gain over the last month and +cough.   Pt. denies cp, palpitations, fever, chills, nausea, vomiting, diarrhea, recent sick contacts. P   Pt. currently states he is on 80mg lasix po at home, reports compliance with medications and no change in diet.

## 2018-12-28 NOTE — ED ADULT NURSE NOTE - OBJECTIVE STATEMENT
63 year old male with Hx of CHF and DM presents to the ED complaining of SOB, increased leg swelling, and difficulty breathing x 3 days. Pt states he may have had some extra salt over the holidays. Pt placed on CM showing sinus tach, Pt afebrile. Pt states 'I've had the sniffles the last few days'. Pt speaking clearly, able top speak in short sentences. 20G IV placed in RA. Pt is A&O x 4, VSS with elevated RR and HR, afebrile, ambulating independently. Pt denies fever, chills, NVD, or chest pain. Jugular venous distention is absent. IV placed, labs drawn, bed in low position, safety measures in place. wife at bedside.

## 2018-12-28 NOTE — ED PROVIDER NOTE - MEDICAL DECISION MAKING DETAILS
Yojana PGY2: 63M hx of extensive cards hx s/p stents chf DM HTN afib TIA CAD s/p carotid enterectomy presents with a cc of SOB x4 days, unable to walk 2/2 FAYE, worse when lying down at night, no chest pain, no fevers, sick contacts at home, also notes worsening LE edema over past few days. exam vss well appearing non toxic rales on exam LE edema c/f fluid overload 2/2 chf vs pna vs uri vs acs will get labs cardiacs cxr ekg likely admit

## 2018-12-29 LAB
ANION GAP SERPL CALC-SCNC: 18 MMOL/L — HIGH (ref 5–17)
BASOPHILS # BLD AUTO: 0.04 K/UL — SIGNIFICANT CHANGE UP (ref 0–0.2)
BASOPHILS NFR BLD AUTO: 0.4 % — SIGNIFICANT CHANGE UP (ref 0–2)
BUN SERPL-MCNC: 53 MG/DL — HIGH (ref 7–23)
CALCIUM SERPL-MCNC: 9.2 MG/DL — SIGNIFICANT CHANGE UP (ref 8.4–10.5)
CHLORIDE SERPL-SCNC: 92 MMOL/L — LOW (ref 96–108)
CO2 SERPL-SCNC: 27 MMOL/L — SIGNIFICANT CHANGE UP (ref 22–31)
CREAT SERPL-MCNC: 2.4 MG/DL — HIGH (ref 0.5–1.3)
EOSINOPHIL # BLD AUTO: 0.03 K/UL — SIGNIFICANT CHANGE UP (ref 0–0.5)
EOSINOPHIL NFR BLD AUTO: 0.3 % — SIGNIFICANT CHANGE UP (ref 0–6)
GLUCOSE BLDC GLUCOMTR-MCNC: 230 MG/DL — HIGH (ref 70–99)
GLUCOSE BLDC GLUCOMTR-MCNC: 252 MG/DL — HIGH (ref 70–99)
GLUCOSE BLDC GLUCOMTR-MCNC: 274 MG/DL — HIGH (ref 70–99)
GLUCOSE BLDC GLUCOMTR-MCNC: 283 MG/DL — HIGH (ref 70–99)
GLUCOSE SERPL-MCNC: 206 MG/DL — HIGH (ref 70–99)
HCT VFR BLD CALC: 50.6 % — HIGH (ref 39–50)
HGB BLD-MCNC: 15.9 G/DL — SIGNIFICANT CHANGE UP (ref 13–17)
IMM GRANULOCYTES NFR BLD AUTO: 0.6 % — SIGNIFICANT CHANGE UP (ref 0–1.5)
LACTATE SERPL-SCNC: 1.3 MMOL/L — SIGNIFICANT CHANGE UP (ref 0.7–2)
LYMPHOCYTES # BLD AUTO: 1.4 K/UL — SIGNIFICANT CHANGE UP (ref 1–3.3)
LYMPHOCYTES # BLD AUTO: 13.8 % — SIGNIFICANT CHANGE UP (ref 13–44)
MCHC RBC-ENTMCNC: 25.4 PG — LOW (ref 27–34)
MCHC RBC-ENTMCNC: 31.4 GM/DL — LOW (ref 32–36)
MCV RBC AUTO: 80.8 FL — SIGNIFICANT CHANGE UP (ref 80–100)
MONOCYTES # BLD AUTO: 1.25 K/UL — HIGH (ref 0–0.9)
MONOCYTES NFR BLD AUTO: 12.3 % — SIGNIFICANT CHANGE UP (ref 2–14)
NEUTROPHILS # BLD AUTO: 7.39 K/UL — SIGNIFICANT CHANGE UP (ref 1.8–7.4)
NEUTROPHILS NFR BLD AUTO: 72.6 % — SIGNIFICANT CHANGE UP (ref 43–77)
PLATELET # BLD AUTO: 191 K/UL — SIGNIFICANT CHANGE UP (ref 150–400)
POTASSIUM SERPL-MCNC: 3.7 MMOL/L — SIGNIFICANT CHANGE UP (ref 3.5–5.3)
POTASSIUM SERPL-SCNC: 3.7 MMOL/L — SIGNIFICANT CHANGE UP (ref 3.5–5.3)
RBC # BLD: 6.26 M/UL — HIGH (ref 4.2–5.8)
RBC # FLD: 16.3 % — HIGH (ref 10.3–14.5)
SODIUM SERPL-SCNC: 137 MMOL/L — SIGNIFICANT CHANGE UP (ref 135–145)
WBC # BLD: 10.17 K/UL — SIGNIFICANT CHANGE UP (ref 3.8–10.5)
WBC # FLD AUTO: 10.17 K/UL — SIGNIFICANT CHANGE UP (ref 3.8–10.5)

## 2018-12-29 PROCEDURE — 93306 TTE W/DOPPLER COMPLETE: CPT | Mod: 26

## 2018-12-29 RX ORDER — INFLUENZA VIRUS VACCINE 15; 15; 15; 15 UG/.5ML; UG/.5ML; UG/.5ML; UG/.5ML
0.5 SUSPENSION INTRAMUSCULAR ONCE
Qty: 0 | Refills: 0 | Status: COMPLETED | OUTPATIENT
Start: 2018-12-29 | End: 2018-12-29

## 2018-12-29 RX ORDER — METOPROLOL TARTRATE 50 MG
50 TABLET ORAL ONCE
Qty: 0 | Refills: 0 | Status: COMPLETED | OUTPATIENT
Start: 2018-12-29 | End: 2018-12-29

## 2018-12-29 RX ORDER — BUMETANIDE 0.25 MG/ML
2 INJECTION INTRAMUSCULAR; INTRAVENOUS EVERY 12 HOURS
Qty: 0 | Refills: 0 | Status: DISCONTINUED | OUTPATIENT
Start: 2018-12-29 | End: 2018-12-30

## 2018-12-29 RX ORDER — METOPROLOL TARTRATE 50 MG
50 TABLET ORAL DAILY
Qty: 0 | Refills: 0 | Status: DISCONTINUED | OUTPATIENT
Start: 2018-12-29 | End: 2018-12-29

## 2018-12-29 RX ORDER — INSULIN LISPRO 100/ML
VIAL (ML) SUBCUTANEOUS AT BEDTIME
Qty: 0 | Refills: 0 | Status: DISCONTINUED | OUTPATIENT
Start: 2018-12-29 | End: 2019-01-01

## 2018-12-29 RX ORDER — METOPROLOL TARTRATE 50 MG
150 TABLET ORAL DAILY
Qty: 0 | Refills: 0 | Status: DISCONTINUED | OUTPATIENT
Start: 2018-12-29 | End: 2019-01-01

## 2018-12-29 RX ADMIN — CEFTRIAXONE 100 GRAM(S): 500 INJECTION, POWDER, FOR SOLUTION INTRAMUSCULAR; INTRAVENOUS at 17:37

## 2018-12-29 RX ADMIN — AZITHROMYCIN 250 MILLIGRAM(S): 500 TABLET, FILM COATED ORAL at 13:19

## 2018-12-29 RX ADMIN — Medication 100 MILLIGRAM(S): at 05:41

## 2018-12-29 RX ADMIN — Medication 50 MILLIGRAM(S): at 11:05

## 2018-12-29 RX ADMIN — DABIGATRAN ETEXILATE MESYLATE 150 MILLIGRAM(S): 150 CAPSULE ORAL at 17:36

## 2018-12-29 RX ADMIN — DABIGATRAN ETEXILATE MESYLATE 150 MILLIGRAM(S): 150 CAPSULE ORAL at 05:41

## 2018-12-29 RX ADMIN — Medication 81 MILLIGRAM(S): at 11:05

## 2018-12-29 RX ADMIN — Medication 80 MILLIGRAM(S): at 05:41

## 2018-12-29 RX ADMIN — BUMETANIDE 2 MILLIGRAM(S): 0.25 INJECTION INTRAMUSCULAR; INTRAVENOUS at 17:36

## 2018-12-29 RX ADMIN — Medication 1: at 22:00

## 2018-12-29 RX ADMIN — Medication 6: at 17:24

## 2018-12-29 RX ADMIN — Medication 100 MILLIGRAM(S): at 17:36

## 2018-12-29 RX ADMIN — Medication 4: at 08:05

## 2018-12-29 RX ADMIN — Medication 6: at 12:38

## 2018-12-29 NOTE — CONSULT NOTE ADULT - SUBJECTIVE AND OBJECTIVE BOX
[facility]  [phone]  NEPHROLOGY      INITIAL CONSULT NOTE  --------------------------------------------------------------------------------  HPI:      63Male  with PMHx  AFIB, HTN, HLD, PVD, CVA, CAD, NSTEMI s/p PCI to mid LAD, prox LCx, distal LCx, Diastolic Heart failure, DM, severe left internal carotid disease s/p CEA presenting with worsening FAYE, LE edema, orthopnea increasing over the past week.   Pt. also endorses over 15 lb weight gain over the last month and +cough. in addition, has hx of ckd, now with rising creatinine. renal consult called.     PAST HISTORY  --------------------------------------------------------------------------------  PAST MEDICAL & SURGICAL HISTORY:  CAD (coronary artery disease)  MI (myocardial infarction): circa 2014  Atrial fibrillation  TIA (transient ischemic attack)  DM type 2 (diabetes mellitus, type 2)  HLD (hyperlipidemia)  HTN (hypertension), benign  S/P carotid endarterectomy: left  Status post angioplasty with stent: LULU x 3 2/7/2014    FAMILY HISTORY:  Family history of heart disease    PAST SOCIAL HISTORY: denies tobacco     ALLERGIES & MEDICATIONS  --------------------------------------------------------------------------------  Allergies    No Known Allergies    Intolerances      Standing Inpatient Medications  aspirin enteric coated 81 milliGRAM(s) Oral daily  azithromycin  IVPB 500 milliGRAM(s) IV Intermittent every 24 hours  buMETAnide Injectable 2 milliGRAM(s) IV Push every 12 hours  cefTRIAXone   IVPB 1 Gram(s) IV Intermittent every 24 hours  dabigatran 150 milliGRAM(s) Oral every 12 hours  dextrose 5%. 1000 milliLiter(s) IV Continuous <Continuous>  dextrose 50% Injectable 12.5 Gram(s) IV Push once  dextrose 50% Injectable 25 Gram(s) IV Push once  dextrose 50% Injectable 25 Gram(s) IV Push once  influenza   Vaccine 0.5 milliLiter(s) IntraMuscular once  insulin lispro (HumaLOG) corrective regimen sliding scale   SubCutaneous three times a day before meals  metolazone 5 milliGRAM(s) Oral daily  metoprolol succinate  milliGRAM(s) Oral daily  pregabalin 100 milliGRAM(s) Oral two times a day    PRN Inpatient Medications  dextrose 40% Gel 15 Gram(s) Oral once PRN  glucagon  Injectable 1 milliGRAM(s) IntraMuscular once PRN      REVIEW OF SYSTEMS  --------------------------------------------------------------------------------  Gen: No  fevers/chills   Skin: No rashes  Head/Eyes/Ears/Mouth: No headache; Normal hearing;  No sinus pain/discomfort, sore throat  Respiratory: +  dyspnea, + cough, - wheezing, hemoptysis -   CV: No chest pain, or palp   GI: No abdominal pain, diarrhea, nausea, vomiting  : No dysuria, decrease urination or hesitancy urinating  hematuria, nocturia  MSK: No joint pain/swelling; no back   also with +  edema   states approx 3 mo ago when he used higher doses of diuretics cr increased, diuretics were decreased but he had increased fluid retention very quickly     All other systems were reviewed and are negative, except as noted.    VITALS/PHYSICAL EXAM  --------------------------------------------------------------------------------  T(C): 36.8 (12-29-18 @ 11:00), Max: 37.8 (12-29-18 @ 03:50)  HR: 87 (12-29-18 @ 11:03) (60 - 104)  BP: 125/68 (12-29-18 @ 11:00) (125/68 - 170/88)  RR: 17 (12-29-18 @ 11:00) (17 - 18)  SpO2: 94% (12-29-18 @ 11:00) (93% - 100%)  Wt(kg): --  Height (cm): 180.34 (12-28-18 @ 10:20)  Weight (kg): 36.7 (12-28-18 @ 19:01)  BMI (kg/m2): 11.3 (12-28-18 @ 19:01)  BSA (m2): 1.44 (12-28-18 @ 19:01)      12-28-18 @ 07:01  -  12-29-18 @ 07:00  --------------------------------------------------------  IN: 520 mL / OUT: 400 mL / NET: 120 mL    12-29-18 @ 07:01  -  12-29-18 @ 14:35  --------------------------------------------------------  IN: 0 mL / OUT: 500 mL / NET: -500 mL      Physical Exam:  	Gen: in supine position. on O2   	+  jvd , supple neck,   	Pulm: decrease bs  no rales or ronchi or wheezing  	CV: RRR, S1S2; no rub  	Back: No CVA tenderness  	Abd: +BS, soft, nontender/nondistended  	: No suprapubic tenderness  	UE: Warm, no cyanosis  no clubbing,  chronic stasis changes. with 1+ edema   	LE: Warm, no cyanosis  no clubbing, no edema  	Neuro: alert and oriented. speech coherent   	Skin: Warm, no decrease skin turgor   	    LABS/STUDIES  --------------------------------------------------------------------------------              15.9   10.17 >-----------<  191      [12-29-18 @ 07:10]              50.6     137  |  92  |  53  ----------------------------<  206      [12-29-18 @ 05:59]  3.7   |  27  |  2.40        Ca     9.2     [12-29-18 @ 05:59]    TPro  7.6  /  Alb  3.6  /  TBili  0.6  /  DBili  x   /  AST  9   /  ALT  7   /  AlkPhos  104  [12-28-18 @ 11:15]          Creatinine Trend:  SCr 2.40 [12-29 @ 05:59]  SCr 2.00 [12-28 @ 12:58]  SCr 2.13 [12-28 @ 11:15]    Urinalysis - [02-09-17 @ 16:33]      Color Yellow / Appearance SL Turbid / SG 1.013 / pH 5.5      Gluc Trace / Ketone Negative  / Bili Negative / Urobili Negative       Blood Large / Protein 100 / Leuk Est Negative / Nitrite Negative      RBC >50 / WBC 11-25 / Hyaline 0-2 / Gran  / Sq Epi  / Non Sq Epi OCC / Bacteria Moderate      HbA1c 9.9      [05-03-18 @ 08:06]  TSH 1.71      [12-28-18 @ 14:58]    HBsAb Nonreact      [02-05-17 @ 11:45]  HBsAg Nonreact      [02-05-17 @ 11:45]  HCV 0.09, Nonreact      [02-03-17 @ 08:51]    dsDNA <12      [02-05-17 @ 11:45]  C3 Complement 123      [02-05-17 @ 11:45]  C4 Complement 33      [02-05-17 @ 11:45]  MPO-ANCA <5.0, interpretation: Negative      [02-05-17 @ 11:45]  PR3-ANCA <5.0, interpretation: Negative      [02-05-17 @ 11:45]  Free Light Chains: kappa 17.30, lambda 8.42, ratio = 2.05      [02-15 @ 07:29]  Immunofixation Serum:   Weak  IgG Kappa Band Identified    Reference Range: None Detected      [02-17-17 @ 07:53]  SPEP Interpretation: Weak Gamma-Migrating Paraprotein Identified      [02-17-17 @ 07:53]  Immunofixation Urine: No Monoclonal Band Identified      [02-18-17 @ 17:41]  UPEP Interpretation: Mild Selective (Glomerular) Proteinuria      [02-18-17 @ 17:41]

## 2018-12-30 LAB
ANION GAP SERPL CALC-SCNC: 14 MMOL/L — SIGNIFICANT CHANGE UP (ref 5–17)
BUN SERPL-MCNC: 64 MG/DL — HIGH (ref 7–23)
CALCIUM SERPL-MCNC: 9.1 MG/DL — SIGNIFICANT CHANGE UP (ref 8.4–10.5)
CHLORIDE SERPL-SCNC: 91 MMOL/L — LOW (ref 96–108)
CO2 SERPL-SCNC: 29 MMOL/L — SIGNIFICANT CHANGE UP (ref 22–31)
CREAT SERPL-MCNC: 2.51 MG/DL — HIGH (ref 0.5–1.3)
GLUCOSE BLDC GLUCOMTR-MCNC: 245 MG/DL — HIGH (ref 70–99)
GLUCOSE BLDC GLUCOMTR-MCNC: 300 MG/DL — HIGH (ref 70–99)
GLUCOSE BLDC GLUCOMTR-MCNC: 388 MG/DL — HIGH (ref 70–99)
GLUCOSE BLDC GLUCOMTR-MCNC: 436 MG/DL — HIGH (ref 70–99)
GLUCOSE BLDC GLUCOMTR-MCNC: 483 MG/DL — CRITICAL HIGH (ref 70–99)
GLUCOSE SERPL-MCNC: 280 MG/DL — HIGH (ref 70–99)
HBA1C BLD-MCNC: 11 % — HIGH (ref 4–5.6)
HCT VFR BLD CALC: 52.1 % — HIGH (ref 39–50)
HGB BLD-MCNC: 16.4 G/DL — SIGNIFICANT CHANGE UP (ref 13–17)
MAGNESIUM SERPL-MCNC: 2.1 MG/DL — SIGNIFICANT CHANGE UP (ref 1.6–2.6)
MCHC RBC-ENTMCNC: 25.7 PG — LOW (ref 27–34)
MCHC RBC-ENTMCNC: 31.5 GM/DL — LOW (ref 32–36)
MCV RBC AUTO: 81.7 FL — SIGNIFICANT CHANGE UP (ref 80–100)
PHOSPHATE SERPL-MCNC: 5.9 MG/DL — HIGH (ref 2.5–4.5)
PLATELET # BLD AUTO: 156 K/UL — SIGNIFICANT CHANGE UP (ref 150–400)
POTASSIUM SERPL-MCNC: 3.8 MMOL/L — SIGNIFICANT CHANGE UP (ref 3.5–5.3)
POTASSIUM SERPL-SCNC: 3.8 MMOL/L — SIGNIFICANT CHANGE UP (ref 3.5–5.3)
RBC # BLD: 6.38 M/UL — HIGH (ref 4.2–5.8)
RBC # FLD: 16.3 % — HIGH (ref 10.3–14.5)
SODIUM SERPL-SCNC: 134 MMOL/L — LOW (ref 135–145)
WBC # BLD: 7.78 K/UL — SIGNIFICANT CHANGE UP (ref 3.8–10.5)
WBC # FLD AUTO: 7.78 K/UL — SIGNIFICANT CHANGE UP (ref 3.8–10.5)

## 2018-12-30 RX ORDER — INSULIN LISPRO 100/ML
7 VIAL (ML) SUBCUTANEOUS
Qty: 0 | Refills: 0 | Status: DISCONTINUED | OUTPATIENT
Start: 2018-12-30 | End: 2019-01-01

## 2018-12-30 RX ORDER — BUMETANIDE 0.25 MG/ML
2 INJECTION INTRAMUSCULAR; INTRAVENOUS DAILY
Qty: 0 | Refills: 0 | Status: DISCONTINUED | OUTPATIENT
Start: 2018-12-31 | End: 2018-12-31

## 2018-12-30 RX ORDER — INSULIN GLARGINE 100 [IU]/ML
21 INJECTION, SOLUTION SUBCUTANEOUS AT BEDTIME
Qty: 0 | Refills: 0 | Status: DISCONTINUED | OUTPATIENT
Start: 2018-12-30 | End: 2019-01-01

## 2018-12-30 RX ORDER — INSULIN LISPRO 100/ML
VIAL (ML) SUBCUTANEOUS
Qty: 0 | Refills: 0 | Status: DISCONTINUED | OUTPATIENT
Start: 2018-12-30 | End: 2018-12-30

## 2018-12-30 RX ORDER — INSULIN LISPRO 100/ML
VIAL (ML) SUBCUTANEOUS
Qty: 0 | Refills: 0 | Status: DISCONTINUED | OUTPATIENT
Start: 2018-12-30 | End: 2019-01-01

## 2018-12-30 RX ADMIN — Medication 4: at 08:05

## 2018-12-30 RX ADMIN — Medication 150 MILLIGRAM(S): at 05:20

## 2018-12-30 RX ADMIN — Medication 4: at 22:00

## 2018-12-30 RX ADMIN — DABIGATRAN ETEXILATE MESYLATE 150 MILLIGRAM(S): 150 CAPSULE ORAL at 17:09

## 2018-12-30 RX ADMIN — Medication 81 MILLIGRAM(S): at 12:16

## 2018-12-30 RX ADMIN — BUMETANIDE 2 MILLIGRAM(S): 0.25 INJECTION INTRAMUSCULAR; INTRAVENOUS at 05:50

## 2018-12-30 RX ADMIN — CEFTRIAXONE 100 GRAM(S): 500 INJECTION, POWDER, FOR SOLUTION INTRAMUSCULAR; INTRAVENOUS at 17:09

## 2018-12-30 RX ADMIN — Medication 100 MILLIGRAM(S): at 17:08

## 2018-12-30 RX ADMIN — Medication 6: at 12:17

## 2018-12-30 RX ADMIN — INSULIN GLARGINE 21 UNIT(S): 100 INJECTION, SOLUTION SUBCUTANEOUS at 22:00

## 2018-12-30 RX ADMIN — Medication 20: at 17:07

## 2018-12-30 RX ADMIN — Medication 100 MILLIGRAM(S): at 05:20

## 2018-12-30 RX ADMIN — AZITHROMYCIN 250 MILLIGRAM(S): 500 TABLET, FILM COATED ORAL at 12:18

## 2018-12-30 RX ADMIN — DABIGATRAN ETEXILATE MESYLATE 150 MILLIGRAM(S): 150 CAPSULE ORAL at 05:20

## 2018-12-30 NOTE — CHART NOTE - NSCHARTNOTEFT_GEN_A_CORE
HgbA1C reported as 11.0  Glucoses consistently > 250 today.  [facility] called for consult - discussed with Dr Daley  Spoke with Dr De La Cruz (endocrinology) - to start Lantus 21units tonight and Humalog 7 units TID prior to meals with low dose sliding scale coverage.  Endocrinology will see patient tomorrow  Above discussed with patient, patient's wife and daughter

## 2018-12-31 ENCOUNTER — TRANSCRIPTION ENCOUNTER (OUTPATIENT)
Age: 63
End: 2018-12-31

## 2018-12-31 DIAGNOSIS — E78.5 HYPERLIPIDEMIA, UNSPECIFIED: ICD-10-CM

## 2018-12-31 DIAGNOSIS — E11.9 TYPE 2 DIABETES MELLITUS WITHOUT COMPLICATIONS: ICD-10-CM

## 2018-12-31 DIAGNOSIS — I10 ESSENTIAL (PRIMARY) HYPERTENSION: ICD-10-CM

## 2018-12-31 LAB
ANION GAP SERPL CALC-SCNC: 14 MMOL/L — SIGNIFICANT CHANGE UP (ref 5–17)
BUN SERPL-MCNC: 63 MG/DL — HIGH (ref 7–23)
CALCIUM SERPL-MCNC: 9.1 MG/DL — SIGNIFICANT CHANGE UP (ref 8.4–10.5)
CHLORIDE SERPL-SCNC: 91 MMOL/L — LOW (ref 96–108)
CO2 SERPL-SCNC: 29 MMOL/L — SIGNIFICANT CHANGE UP (ref 22–31)
CREAT SERPL-MCNC: 2.2 MG/DL — HIGH (ref 0.5–1.3)
GLUCOSE BLDC GLUCOMTR-MCNC: 184 MG/DL — HIGH (ref 70–99)
GLUCOSE BLDC GLUCOMTR-MCNC: 202 MG/DL — HIGH (ref 70–99)
GLUCOSE BLDC GLUCOMTR-MCNC: 317 MG/DL — HIGH (ref 70–99)
GLUCOSE BLDC GLUCOMTR-MCNC: 386 MG/DL — HIGH (ref 70–99)
GLUCOSE SERPL-MCNC: 202 MG/DL — HIGH (ref 70–99)
HCT VFR BLD CALC: 52.2 % — HIGH (ref 39–50)
HGB BLD-MCNC: 16.9 G/DL — SIGNIFICANT CHANGE UP (ref 13–17)
MAGNESIUM SERPL-MCNC: 2.3 MG/DL — SIGNIFICANT CHANGE UP (ref 1.6–2.6)
MCHC RBC-ENTMCNC: 25.7 PG — LOW (ref 27–34)
MCHC RBC-ENTMCNC: 32.4 GM/DL — SIGNIFICANT CHANGE UP (ref 32–36)
MCV RBC AUTO: 79.3 FL — LOW (ref 80–100)
PLATELET # BLD AUTO: 176 K/UL — SIGNIFICANT CHANGE UP (ref 150–400)
POTASSIUM SERPL-MCNC: 3.6 MMOL/L — SIGNIFICANT CHANGE UP (ref 3.5–5.3)
POTASSIUM SERPL-SCNC: 3.6 MMOL/L — SIGNIFICANT CHANGE UP (ref 3.5–5.3)
RBC # BLD: 6.58 M/UL — HIGH (ref 4.2–5.8)
RBC # FLD: 16 % — HIGH (ref 10.3–14.5)
SODIUM SERPL-SCNC: 134 MMOL/L — LOW (ref 135–145)
WBC # BLD: 7.76 K/UL — SIGNIFICANT CHANGE UP (ref 3.8–10.5)
WBC # FLD AUTO: 7.76 K/UL — SIGNIFICANT CHANGE UP (ref 3.8–10.5)

## 2018-12-31 PROCEDURE — 99255 IP/OBS CONSLTJ NEW/EST HI 80: CPT

## 2018-12-31 RX ORDER — BUMETANIDE 0.25 MG/ML
2 INJECTION INTRAMUSCULAR; INTRAVENOUS DAILY
Qty: 0 | Refills: 0 | Status: DISCONTINUED | OUTPATIENT
Start: 2019-01-01 | End: 2019-01-01

## 2018-12-31 RX ADMIN — DABIGATRAN ETEXILATE MESYLATE 150 MILLIGRAM(S): 150 CAPSULE ORAL at 05:40

## 2018-12-31 RX ADMIN — Medication 150 MILLIGRAM(S): at 05:40

## 2018-12-31 RX ADMIN — Medication 100 MILLIGRAM(S): at 05:40

## 2018-12-31 RX ADMIN — Medication 7 UNIT(S): at 12:42

## 2018-12-31 RX ADMIN — Medication 7 UNIT(S): at 17:03

## 2018-12-31 RX ADMIN — DABIGATRAN ETEXILATE MESYLATE 150 MILLIGRAM(S): 150 CAPSULE ORAL at 17:04

## 2018-12-31 RX ADMIN — Medication 7 UNIT(S): at 08:30

## 2018-12-31 RX ADMIN — Medication 1: at 08:30

## 2018-12-31 RX ADMIN — BUMETANIDE 2 MILLIGRAM(S): 0.25 INJECTION INTRAMUSCULAR; INTRAVENOUS at 05:40

## 2018-12-31 RX ADMIN — Medication 2: at 12:42

## 2018-12-31 RX ADMIN — Medication 81 MILLIGRAM(S): at 12:42

## 2018-12-31 RX ADMIN — Medication 5: at 17:03

## 2018-12-31 RX ADMIN — AZITHROMYCIN 250 MILLIGRAM(S): 500 TABLET, FILM COATED ORAL at 12:45

## 2018-12-31 RX ADMIN — CEFTRIAXONE 100 GRAM(S): 500 INJECTION, POWDER, FOR SOLUTION INTRAMUSCULAR; INTRAVENOUS at 17:04

## 2018-12-31 RX ADMIN — Medication 2: at 21:40

## 2018-12-31 RX ADMIN — INSULIN GLARGINE 21 UNIT(S): 100 INJECTION, SOLUTION SUBCUTANEOUS at 21:40

## 2018-12-31 RX ADMIN — Medication 100 MILLIGRAM(S): at 17:06

## 2018-12-31 NOTE — CONSULT NOTE ADULT - ATTENDING COMMENTS
continue insulin regimen as above  needs insulin teaching and DM education prior to dc   discussed regimen with pt   needs endocrine followup    If patient wishes to follow up with [facility] Endocrinology     [facility]  [address], [address], [place] [address] [phone]   Please call to schedule appointment with CDE/Nutritionist and MD appointment next available   --------------------------------------------------------------------------------------  [facility]  [address] [place] [address], [address] [place] [address] [phone]  -------------------------------------------------------------------------------------  [facility]  [facility]  [phone]   Call for appt and location continue insulin regimen as above  needs insulin teaching and DM education prior to dc   discussed regimen with pt   needs endocrine followup    for dc:  stop glimepiride 4mg given long standing hx of DM   please send Lantus/basaglar to the pharmacy as well as novolog and assess for coverage   inform endocrine of dc planning       If patient wishes to follow up with [facility] Endocrinology     [facility]  [address], [address], [place], [place] [address] [phone]   Please call to schedule appointment with CDE/Nutritionist and MD appointment next available   --------------------------------------------------------------------------------------  [facility]  [address] [place] [address], [address] [place] [address] [phone]  -------------------------------------------------------------------------------------  [facility]  [facility]  [phone]   Call for appt and location

## 2018-12-31 NOTE — DISCHARGE NOTE ADULT - CARE PROVIDER_API CALL
Yayo Gallegos), Internal Medicine; Nephrology  [address]  [address]  [place] [address]  Phone: [phone]  Fax: [phone]    Magan Mazariegos), Cardiology; Internal Medicine  [address]  [address]  [place] [address]  Phone: [phone]  Fax: [phone]    Janna Andrade), EndocrinologyMetabDiabetes; Internal Medicine  [address]  [address]  [place] [address]  Phone: [phone]  Fax: [phone]

## 2018-12-31 NOTE — DISCHARGE NOTE ADULT - ADDITIONAL INSTRUCTIONS
Make appointments to follow up with your physicians  Bring all discharge paperwork including discharge medication list to follow up appointments  You have an appointment with your cardiologist (Dr Mazariegos) on January 15 - call to confirm Make appointments to follow up with your physicians  Bring all discharge paperwork including discharge medication list to follow up appointments  You have an appointment with your cardiologist (Dr Mazariegos) on January 15 - call to confirm  It is very important you follow up with the endocrinologist as well as your nephrologist and primary care provider

## 2018-12-31 NOTE — CONSULT NOTE ADULT - PROBLEM SELECTOR RECOMMENDATION 9
Lantus 21 and Humalog 7 started 12/30  -4U ISS required at bedtime last night with 1U before breakfast, 2 U before lunch  -monitor FS until tomorrow AM prior to any further adjustments  -will require home insulin given A1c > 9.5   -will need Diabetes education on insulin injection, as well as nutrition education Lantus 21 and Humalog 7 started 12/30  -4U ISS required at bedtime last night with 1U before breakfast, 2 U before lunch  -monitor FS until tomorrow AM prior to any further adjustments  -will require home insulin given A1c > 9.5   -will need Diabetes education on insulin injection, as well as nutrition education prior to d/c  -recommend watching additional night since patient is new to insulin Lantus 21 and Humalog 7 started 12/30  -4U ISS required at bedtime last night with 1U before breakfast, 2 U before lunch  -monitor FS until tomorrow AM prior to any further adjustments  -will need Diabetes education on insulin injection, as well as nutrition education prior to d/c  -please touch base with endocrine prior to d/c  -Follow up with endo clinic - [phone]

## 2018-12-31 NOTE — DISCHARGE NOTE ADULT - MEDICATION SUMMARY - MEDICATIONS TO TAKE
I will START or STAY ON the medications listed below when I get home from the hospital:    aspirin 81 mg oral delayed release tablet  -- 1 tab(s) by mouth once a day  -- Indication: For Coronary artery disease    dabigatran 75 mg oral capsule  -- 150 milligram(s) by mouth 2 times a day  -- Indication: For Anticoagulant     Lyrica 100 mg oral capsule  -- 1 cap(s) by mouth 2 times a day  -- Indication: For neuropathy    Basaglar KwikPen 100 units/mL subcutaneous solution  -- 21 unit(s) subcutaneous once a day (at bedtime)   -- Do not drink alcoholic beverages when taking this medication.  It is very important that you take or use this exactly as directed.  Do not skip doses or discontinue unless directed by your doctor.  Keep in refrigerator.  Do not freeze.    -- Indication: For Diabetes    NovoLOG FlexPen 100 units/mL injectable solution  -- 8 unit(s) subcutaneous 3 times a day (before meals)   -- Check with your doctor before becoming pregnant.  Do not drink alcoholic beverages when taking this medication.  Keep in refrigerator.  Do not freeze.  Obtain medical advice before taking any non-prescription drugs as some may affect the action of this medication.    -- Indication: For Diabetes    metoprolol succinate 50 mg oral tablet, extended release  -- 3 tab(s) by mouth once a day  -- Indication: For Diastolic CHF    bumetanide 2 mg oral tablet  -- 1 tab(s) by mouth once a day  -- Indication: For Diuretic    metOLazone 5 mg oral tablet  -- 1 tab(s) by mouth 3 times a week on Monday, Wednesday and Friday  -- Indication: For Diuretic

## 2018-12-31 NOTE — DISCHARGE NOTE ADULT - CARE PLAN
Principal Discharge DX:	SOB (shortness of breath)  Goal:	resolved  Assessment and plan of treatment:	Make appointments to follow up with your physicians  Bring all discharge paperwork including discharge medication list to follow up appointments  You have an appointment with your cardiologist (Dr Mazariegos) on January 15 - call to confirm  Secondary Diagnosis:	Diastolic CHF  Goal:	symptom management  Assessment and plan of treatment:	Weigh yourself daily.  If you gain 3lbs in 3 days, or 5lbs in a week call your Health Care Provider.  Do not eat or drink foods containing more than 2000mg of salt (sodium) in your diet every day.  Call your Health Care Provider if you have any swelling or increased swelling in your feet, ankles, and/or stomach.  Take all of your medication as directed.  If you become dizzy call your Health Care Provider.  Secondary Diagnosis:	DM type 2 (diabetes mellitus, type 2)  Goal:	disease management  Assessment and plan of treatment:	HgA1C this admission 11.  Make sure you get your HgA1c checked every three months.  If you take oral diabetes medications, check your blood glucose two times a day.  If you take insulin, check your blood glucose before meals and at bedtime.  It's important not to skip any meals.  Keep a log of your blood glucose results and always take it with you to your doctor appointments.  Keep a list of your current medications including injectables and over the counter medications and bring this medication list with you to all your doctor appointments.  If you have not seen your ophthalmologist this year call for appointment.  Check your feet daily for redness, sores, or openings. Do not self treat. If no improvement in two days call your primary care physician for an appointment.  Low blood sugar (hypoglycemia) is a blood sugar below 70mg/dl. Check your blood sugar if you feel signs/symptoms of hypoglycemia. If your blood sugar is below 70 take 15 grams of carbohydrates (ex 4 oz of apple juice, 3-4 glucose tablets, or 4-6 oz of regular soda) wait 15 minutes and repeat blood sugar to make sure it comes up above 70.  If your blood sugar is above 70 and you are due for a meal, have a meal.  If you are not due for a meal have a snack.  This snack helps keeps your blood sugar at a safe range.  Secondary Diagnosis:	ANT (acute kidney injury)  Goal:	return to baseline kidney function  Assessment and plan of treatment:	Avoid taking (NSAIDs) - (ex: Ibuprofen, Advil, Celebrex, Naprosyn)  Avoid taking any nephrotoxic agents (can harm kidneys) - Intravenous contrast for diagnostic testing, combination cold medications.  Have all medications adjusted for your renal function by your Health Care Provider.  Blood pressure control is important.  Take all medication as prescribed.  Secondary Diagnosis:	Pneumonia  Goal:	resolved  Assessment and plan of treatment:	Pneumonia is a lung infection that can cause a fever, cough, and trouble breathing.  Continue all antibiotics as ordered until complete.  Nutrition is important, eat small frequent meals.  Get lots of rest and drink fluids.  Call your health care provider upon arrival home from hospital and make a follow up appointment for one week.  If your cough worsens, you develop fever greater than 101', you have shaking chills, a fast heartbeat, trouble breathing and/or feel your are breathing much faster than usual, call your healthcare provider.  Make sure you wash your hands frequently.

## 2018-12-31 NOTE — DISCHARGE NOTE ADULT - PLAN OF CARE
resolved Make appointments to follow up with your physicians  Bring all discharge paperwork including discharge medication list to follow up appointments  You have an appointment with your cardiologist (Dr Mazariegos) on January 15 - call to confirm symptom management Pneumonia is a lung infection that can cause a fever, cough, and trouble breathing.  Continue all antibiotics as ordered until complete.  Nutrition is important, eat small frequent meals.  Get lots of rest and drink fluids.  Call your health care provider upon arrival home from hospital and make a follow up appointment for one week.  If your cough worsens, you develop fever greater than 101', you have shaking chills, a fast heartbeat, trouble breathing and/or feel your are breathing much faster than usual, call your healthcare provider.  Make sure you wash your hands frequently. Weigh yourself daily.  If you gain 3lbs in 3 days, or 5lbs in a week call your Health Care Provider.  Do not eat or drink foods containing more than 2000mg of salt (sodium) in your diet every day.  Call your Health Care Provider if you have any swelling or increased swelling in your feet, ankles, and/or stomach.  Take all of your medication as directed.  If you become dizzy call your Health Care Provider. disease management HgA1C this admission 11.  Make sure you get your HgA1c checked every three months.  If you take oral diabetes medications, check your blood glucose two times a day.  If you take insulin, check your blood glucose before meals and at bedtime.  It's important not to skip any meals.  Keep a log of your blood glucose results and always take it with you to your doctor appointments.  Keep a list of your current medications including injectables and over the counter medications and bring this medication list with you to all your doctor appointments.  If you have not seen your ophthalmologist this year call for appointment.  Check your feet daily for redness, sores, or openings. Do not self treat. If no improvement in two days call your primary care physician for an appointment.  Low blood sugar (hypoglycemia) is a blood sugar below 70mg/dl. Check your blood sugar if you feel signs/symptoms of hypoglycemia. If your blood sugar is below 70 take 15 grams of carbohydrates (ex 4 oz of apple juice, 3-4 glucose tablets, or 4-6 oz of regular soda) wait 15 minutes and repeat blood sugar to make sure it comes up above 70.  If your blood sugar is above 70 and you are due for a meal, have a meal.  If you are not due for a meal have a snack.  This snack helps keeps your blood sugar at a safe range. return to baseline kidney function Avoid taking (NSAIDs) - (ex: Ibuprofen, Advil, Celebrex, Naprosyn)  Avoid taking any nephrotoxic agents (can harm kidneys) - Intravenous contrast for diagnostic testing, combination cold medications.  Have all medications adjusted for your renal function by your Health Care Provider.  Blood pressure control is important.  Take all medication as prescribed.

## 2018-12-31 NOTE — CONSULT NOTE ADULT - PROBLEM SELECTOR RECOMMENDATION 2
-fasting lipid panel   -will need statin -please obtain fasting lipid panel   -pt with DM and CV risk factors, would benefit from statin

## 2018-12-31 NOTE — CHART NOTE - NSCHARTNOTEFT_GEN_A_CORE
Dr Faustin (patient's pulmonologist) contacted regarding oral antibiotic recommendation.  Patient has received 4 days of IV Zithromax and IV Rocephin.  Not febrile, no leukocytosis, feels "much better"  Pending discharge home - probably tomorrow following glucose checks tomorrow  Has been cleared for discharge by cardiology, renal, medicine and pulmonary.  As per Dr Faustin - patient probably no longer requires antibiotic treatment - he can be discharged OFF antibiotics.

## 2018-12-31 NOTE — CONSULT NOTE ADULT - ASSESSMENT
63 year old male with PMHx  AFIB, HTN, HLD, PVD, CVA, CKD, CAD, NSTEMI s/p PCI to mid LAD, prox LCx, distal LCx, Diastolic Heart failure, DM, severe left internal carotid disease s/p CEA presenting with Acute/chronic diastolic CHF, being seen by endocrine for elevated fingersticks with A1c of 11.

## 2018-12-31 NOTE — CONSULT NOTE ADULT - SUBJECTIVE AND OBJECTIVE BOX
Request for consultation: Diabetes Management, A1c > 10  Requested by: Medicine    Patient is a 63y old  Male who presents with a chief complaint of sob/cough (31 Dec 2018 10:27)    HPI:  63Male  with PMHx  AFIB, HTN, HLD, PVD, CVA, CAD, NSTEMI s/p PCI to mid LAD, prox LCx, distal LCx, Diastolic Heart failure, DM, severe left internal carotid disease s/p CEA presenting with worsening FAYE, LE edema, orthopnea increasing over the past week prior to admission. Pt. also endorses over 15 lb weight gain over the last month and +cough. He denies cp, palpitations, fever, chills, nausea, vomiting, diarrhea, recent sick contacts. Pt. currently states he is on 80mg lasix po at home for his HF and reports compliance with medications and no change in diet.    The patient was diagnosed with Type 2 DM in _. He takes _ at home and follows with DrSalty _. He states he is compliant with his medication regimen and diet. Ophto? Podiatry? Neuropathy? MI/Stroke/renal disease     FAMILY HISTORY:  Family history of heart disease    PAST MEDICAL & SURGICAL HISTORY:  CAD (coronary artery disease)  MI (myocardial infarction): circa 2014  Atrial fibrillation  TIA (transient ischemic attack)  DM type 2 (diabetes mellitus, type 2)  HLD (hyperlipidemia)  HTN (hypertension), benign  S/P carotid endarterectomy: left  Status post angioplasty with stent: LULU x 3 2/7/2014    Birth History:  Developmental History:    Review of Systems:  All review of systems negative, except for those marked:  General:		[] Abnormal:  Pulmonary:		[] Abnormal:  Cardiac:		[] Abnormal:  Gastrointestinal:	[] Abnormal:  ENT:			[] Abnormal:  Renal/Urologic:		[] Abnormal:  Musculoskeletal:	[] Abnormal:  Endocrine:		[] Abnormal:  Hematologic:		[] Abnormal:  Neurologic:		[] Abnormal:  Skin:			[] Abnormal:  Allergy/Immune:	[] Abnormal:  Psychiatric:		[] Abnormal:    Allergies    No Known Allergies    Intolerances      MEDICATIONS  (STANDING):  aspirin enteric coated 81 milliGRAM(s) Oral daily  azithromycin  IVPB 500 milliGRAM(s) IV Intermittent every 24 hours  buMETAnide Injectable 2 milliGRAM(s) IV Push daily  cefTRIAXone   IVPB 1 Gram(s) IV Intermittent every 24 hours  dabigatran 150 milliGRAM(s) Oral every 12 hours  dextrose 5%. 1000 milliLiter(s) (50 mL/Hr) IV Continuous <Continuous>  dextrose 50% Injectable 12.5 Gram(s) IV Push once  dextrose 50% Injectable 25 Gram(s) IV Push once  dextrose 50% Injectable 25 Gram(s) IV Push once  influenza   Vaccine 0.5 milliLiter(s) IntraMuscular once  insulin glargine Injectable (LANTUS) 21 Unit(s) SubCutaneous at bedtime  insulin lispro (HumaLOG) corrective regimen sliding scale   SubCutaneous at bedtime  insulin lispro (HumaLOG) corrective regimen sliding scale   SubCutaneous three times a day before meals  insulin lispro Injectable (HumaLOG) 7 Unit(s) SubCutaneous three times a day before meals  metolazone 5 milliGRAM(s) Oral daily  metoprolol succinate  milliGRAM(s) Oral daily  pregabalin 100 milliGRAM(s) Oral two times a day    MEDICATIONS  (PRN):  dextrose 40% Gel 15 Gram(s) Oral once PRN Blood Glucose LESS THAN 70 milliGRAM(s)/deciliter  glucagon  Injectable 1 milliGRAM(s) IntraMuscular once PRN Glucose LESS THAN 70 milligrams/deciliter      Vital Signs Last 24 Hrs  T(C): 36.4 (31 Dec 2018 04:25), Max: 36.6 (30 Dec 2018 20:32)  T(F): 97.5 (31 Dec 2018 04:25), Max: 97.8 (30 Dec 2018 20:32)  HR: 67 (31 Dec 2018 04:25) (67 - 87)  BP: 126/71 (31 Dec 2018 04:25) (126/71 - 154/83)  BP(mean): --  RR: 17 (31 Dec 2018 04:25) (17 - 18)  SpO2: 94% (31 Dec 2018 04:25) (94% - 96%)    Weight (kg): 36.7 (12-28 @ 19:01)    PHYSICAL EXAM  All physical exam findings normal, except those marked:  General:	Alert, active, cooperative, NAD, well hydrated  .		[] Abnormal:  Neck		Normal: supple, no cervical adenopathy, no palpable thyroid  .		[] Abnormal:  Cardiovascular	Normal: regular rate, normal S1, S2, no murmurs  .		[] Abnormal:  Respiratory	Normal: no chest wall deformity, normal respiratory pattern, CTA B/L  .		[] Abnormal:  Abdominal	Normal: soft, ND, NT, bowel sounds present, no masses, no organomegaly  .		[] Abnormal:  		Normal normal genitalia, testes descended, circumcised/uncircumcised  .		Toya stage:			Breast toya:  .		Menstrual history:  .		[] Abnormal:  Extremities	Normal: FROM x4  .		[] Abnormal:  Skin		Normal: intact and not indurated, no rash, no acanthosis nigricans  .		[] Abnormal:  Neurologic	Normal: grossly intact  .		[] Abnormal:    LABS                          16.9   7.76  )-----------( 176      ( 31 Dec 2018 08:10 )             52.2     12-31    134<L>  |  91<L>  |  63<H>  ----------------------------<  202<H>  3.6   |  29  |  2.20<H>    Ca    9.1      31 Dec 2018 06:34  Phos  5.9     12-30  Mg     2.3     12-31      Hemoglobin A1C, Whole Blood: 11.0 % (12-30 @ 08:26)      CAPILLARY BLOOD GLUCOSE      POCT Blood Glucose.: 184 mg/dL (31 Dec 2018 07:45)  POCT Blood Glucose.: 436 mg/dL (30 Dec 2018 21:07)  POCT Blood Glucose.: 483 mg/dL (30 Dec 2018 21:05)  POCT Blood Glucose.: 388 mg/dL (30 Dec 2018 16:32)  POCT Blood Glucose.: 300 mg/dL (30 Dec 2018 11:50) Request for consultation: Diabetes Management, A1c > 10  Requested by: Medicine    Patient is a 63y old  Male who presents with a chief complaint of sob/cough (31 Dec 2018 10:27)    HPI:  63Male  with PMHx  AFIB, HTN, HLD, PVD, CVA, CAD, NSTEMI s/p PCI to mid LAD, prox LCx, distal LCx, Diastolic Heart failure, DM, severe left internal carotid disease s/p CEA presenting with worsening FAYE, LE edema, orthopnea increasing over the past week prior to admission. Pt. also endorses over 15 lb weight gain over the last month and +cough. He denies cp, palpitations, fever, chills, nausea, vomiting, diarrhea, recent sick contacts. Pt. currently states he is on 80mg lasix po at home for his HF and reports compliance with medications and no change in diet.    The patient was diagnosed with Type 2 DM 20 years ago. He used to take Metformin 1000 mg/day but stopped two years ago when his kidney function declined. Currently he only takes Glimepiride 4 mg/day at home. He has never taken insulin because he was always scared of needles, but now states he is willing to try. He does not follow with an endocrinologist and only sees a PCP Dr. Weber and cardiologist Dr. Colby Mazariegos. He states that his A1c has never been higher than 8 on the outside, and regularly follows with his PCP every 3 months. He states he takes his Glimepiride regularly and tries to watch his diet, but eats a lot of fruit and struggles with portion control.     He has had microvascular and macrovascular complications of his diabetes. He has severe neuropathy for which he takes Lyrica, as well as renal disease, TIA, and a prior MI requiring stenting. He denies any changes to his vision and hasn't seen an ophthalmologist in 5 years.    Yesterday, he was started on 21 units of Lantus and 7 units of Humalog after ISS requirements were calculated. His FS are better controlled today.     FAMILY HISTORY:  Family history of heart disease    PAST MEDICAL & SURGICAL HISTORY:  CAD (coronary artery disease)  MI (myocardial infarction): circa 2014  Atrial fibrillation  TIA (transient ischemic attack)  DM type 2 (diabetes mellitus, type 2)  HLD (hyperlipidemia)  HTN (hypertension), benign  S/P carotid endarterectomy: left  Status post angioplasty with stent: LULU x 3 2/7/2014    Birth History:  Developmental History:    Review of Systems:  All review of systems negative, except for those marked:  General:		[] Abnormal:  Pulmonary:		[] Abnormal:  Cardiac:		[] Abnormal:  Gastrointestinal:	[] Abnormal:  ENT:			[] Abnormal:  Renal/Urologic:		[] Abnormal:  Musculoskeletal:	[] Abnormal:  Endocrine:		[] Abnormal:  Hematologic:		[] Abnormal:  Neurologic:		[] Abnormal:  Skin:			[] Abnormal:  Allergy/Immune:	[] Abnormal:  Psychiatric:		[] Abnormal:    Allergies    No Known Allergies    Intolerances      MEDICATIONS  (STANDING):  aspirin enteric coated 81 milliGRAM(s) Oral daily  azithromycin  IVPB 500 milliGRAM(s) IV Intermittent every 24 hours  buMETAnide Injectable 2 milliGRAM(s) IV Push daily  cefTRIAXone   IVPB 1 Gram(s) IV Intermittent every 24 hours  dabigatran 150 milliGRAM(s) Oral every 12 hours  dextrose 5%. 1000 milliLiter(s) (50 mL/Hr) IV Continuous <Continuous>  dextrose 50% Injectable 12.5 Gram(s) IV Push once  dextrose 50% Injectable 25 Gram(s) IV Push once  dextrose 50% Injectable 25 Gram(s) IV Push once  influenza   Vaccine 0.5 milliLiter(s) IntraMuscular once  insulin glargine Injectable (LANTUS) 21 Unit(s) SubCutaneous at bedtime  insulin lispro (HumaLOG) corrective regimen sliding scale   SubCutaneous at bedtime  insulin lispro (HumaLOG) corrective regimen sliding scale   SubCutaneous three times a day before meals  insulin lispro Injectable (HumaLOG) 7 Unit(s) SubCutaneous three times a day before meals  metolazone 5 milliGRAM(s) Oral daily  metoprolol succinate  milliGRAM(s) Oral daily  pregabalin 100 milliGRAM(s) Oral two times a day    MEDICATIONS  (PRN):  dextrose 40% Gel 15 Gram(s) Oral once PRN Blood Glucose LESS THAN 70 milliGRAM(s)/deciliter  glucagon  Injectable 1 milliGRAM(s) IntraMuscular once PRN Glucose LESS THAN 70 milligrams/deciliter      Vital Signs Last 24 Hrs  T(C): 36.4 (31 Dec 2018 04:25), Max: 36.6 (30 Dec 2018 20:32)  T(F): 97.5 (31 Dec 2018 04:25), Max: 97.8 (30 Dec 2018 20:32)  HR: 67 (31 Dec 2018 04:25) (67 - 87)  BP: 126/71 (31 Dec 2018 04:25) (126/71 - 154/83)  BP(mean): --  RR: 17 (31 Dec 2018 04:25) (17 - 18)  SpO2: 94% (31 Dec 2018 04:25) (94% - 96%)    Weight (kg): 36.7 (12-28 @ 19:01)    PHYSICAL EXAM  All physical exam findings normal, except those marked:  General:	Alert, active, cooperative, NAD, well hydratedl obese  Neck		Normal: supple, no cervical adenopathy, no palpable thyroid  Cardiovascular	Normal: regular rate, normal S1, S2, no murmurs  Respiratory	Normal: no chest wall deformity, normal respiratory pattern, CTA B/L  Abdominal	Normal: soft, ND, NT, bowel sounds present, no masses, no organomegaly  Skin  2+ peripheral pulses; venous insufficiency; numbness in medial aspect of foot; decreased sensation bilaterally to mid calf however can appreciate light touch; no sores noted  Neurologic	Normal: grossly intact    LABS                          16.9   7.76  )-----------( 176      ( 31 Dec 2018 08:10 )             52.2     12-31    134<L>  |  91<L>  |  63<H>  ----------------------------<  202<H>  3.6   |  29  |  2.20<H>    Ca    9.1      31 Dec 2018 06:34  Phos  5.9     12-30  Mg     2.3     12-31      Hemoglobin A1C, Whole Blood: 11.0 % (12-30 @ 08:26)      CAPILLARY BLOOD GLUCOSE      POCT Blood Glucose.: 184 mg/dL (31 Dec 2018 07:45)  POCT Blood Glucose.: 436 mg/dL (30 Dec 2018 21:07)  POCT Blood Glucose.: 483 mg/dL (30 Dec 2018 21:05)  POCT Blood Glucose.: 388 mg/dL (30 Dec 2018 16:32)  POCT Blood Glucose.: 300 mg/dL (30 Dec 2018 11:50) Request for consultation: Diabetes Management, A1c > 10  Requested by: Medicine    Patient is a 63y old  Male who presents with a chief complaint of sob/cough (31 Dec 2018 10:27)    HPI:  63Male  with PMHx  AFIB, HTN, HLD, PVD, CVA, CAD, NSTEMI s/p PCI to mid LAD, prox LCx, distal LCx, Diastolic Heart failure, DM, severe left internal carotid disease s/p CEA presenting with worsening FAYE, LE edema, orthopnea increasing over the past week prior to admission. Pt. also endorses over 15 lb weight gain over the last month and +cough. He denies cp, palpitations, fever, chills, nausea, vomiting, diarrhea, recent sick contacts. Pt. currently states he is on 80mg lasix po at home for his HF and reports compliance with medications and no change in diet.    The patient was diagnosed with Type 2 DM 20 years ago. He used to take Metformin 1000 mg/day but stopped two years ago when his kidney function declined. Currently he only takes Glimepiride 4 mg/day at home. He has never taken insulin because he was always scared of needles, but now states he is willing to try. He does not follow with an endocrinologist and only sees a PCP Dr. Weber and cardiologist Dr. Colby Mazariegos. He states that his A1c has never been higher than 8 on the outside, and regularly follows with his PCP every 3 months. He states he takes his Glimepiride regularly and tries to watch his diet, but eats a lot of fruit and struggles with portion control. He stopped checking fingersticks at home about three months ago, but when he used to, they ranged from 120-150 in the AM before breakfast.    He has had microvascular and macrovascular complications of his diabetes. He has severe neuropathy for which he takes Lyrica, as well as renal disease, TIA, and a prior MI requiring stenting. He denies any changes to his vision and hasn't seen an ophthalmologist in 5 years.    Yesterday, he was started on 21 units of Lantus and 7 units of Humalog after ISS requirements were calculated. His FS are better controlled today.     FAMILY HISTORY:  Family history of heart disease    PAST MEDICAL & SURGICAL HISTORY:  CAD (coronary artery disease)  MI (myocardial infarction): circa 2014  Atrial fibrillation  TIA (transient ischemic attack)  DM type 2 (diabetes mellitus, type 2)  HLD (hyperlipidemia)  HTN (hypertension), benign  S/P carotid endarterectomy: left  Status post angioplasty with stent: LULU x 3 2/7/2014    Birth History:  Developmental History:    Review of Systems:  All review of systems negative, except for those marked:  General:		[] Abnormal:  Pulmonary:		[] Abnormal:  Cardiac:		[] Abnormal:  Gastrointestinal:	[] Abnormal:  ENT:			[] Abnormal:  Renal/Urologic:		[] Abnormal:  Musculoskeletal:	[] Abnormal:  Endocrine:		[] Abnormal:  Hematologic:		[] Abnormal:  Neurologic:		[] Abnormal:  Skin:			[] Abnormal:  Allergy/Immune:	[] Abnormal:  Psychiatric:		[] Abnormal:    Allergies    No Known Allergies    Intolerances      MEDICATIONS  (STANDING):  aspirin enteric coated 81 milliGRAM(s) Oral daily  azithromycin  IVPB 500 milliGRAM(s) IV Intermittent every 24 hours  buMETAnide Injectable 2 milliGRAM(s) IV Push daily  cefTRIAXone   IVPB 1 Gram(s) IV Intermittent every 24 hours  dabigatran 150 milliGRAM(s) Oral every 12 hours  dextrose 5%. 1000 milliLiter(s) (50 mL/Hr) IV Continuous <Continuous>  dextrose 50% Injectable 12.5 Gram(s) IV Push once  dextrose 50% Injectable 25 Gram(s) IV Push once  dextrose 50% Injectable 25 Gram(s) IV Push once  influenza   Vaccine 0.5 milliLiter(s) IntraMuscular once  insulin glargine Injectable (LANTUS) 21 Unit(s) SubCutaneous at bedtime  insulin lispro (HumaLOG) corrective regimen sliding scale   SubCutaneous at bedtime  insulin lispro (HumaLOG) corrective regimen sliding scale   SubCutaneous three times a day before meals  insulin lispro Injectable (HumaLOG) 7 Unit(s) SubCutaneous three times a day before meals  metolazone 5 milliGRAM(s) Oral daily  metoprolol succinate  milliGRAM(s) Oral daily  pregabalin 100 milliGRAM(s) Oral two times a day    MEDICATIONS  (PRN):  dextrose 40% Gel 15 Gram(s) Oral once PRN Blood Glucose LESS THAN 70 milliGRAM(s)/deciliter  glucagon  Injectable 1 milliGRAM(s) IntraMuscular once PRN Glucose LESS THAN 70 milligrams/deciliter      Vital Signs Last 24 Hrs  T(C): 36.4 (31 Dec 2018 04:25), Max: 36.6 (30 Dec 2018 20:32)  T(F): 97.5 (31 Dec 2018 04:25), Max: 97.8 (30 Dec 2018 20:32)  HR: 67 (31 Dec 2018 04:25) (67 - 87)  BP: 126/71 (31 Dec 2018 04:25) (126/71 - 154/83)  BP(mean): --  RR: 17 (31 Dec 2018 04:25) (17 - 18)  SpO2: 94% (31 Dec 2018 04:25) (94% - 96%)    Weight (kg): 36.7 (12-28 @ 19:01)    PHYSICAL EXAM  All physical exam findings normal, except those marked:  General:	Alert, active, cooperative, NAD, well hydratedl obese  Neck		Normal: supple, no cervical adenopathy, no palpable thyroid  Cardiovascular	Normal: regular rate, normal S1, S2, no murmurs  Respiratory	Normal: no chest wall deformity, normal respiratory pattern, CTA B/L  Abdominal	Normal: soft, ND, NT, bowel sounds present, no masses, no organomegaly  Skin  2+ peripheral pulses; venous insufficiency; numbness in medial aspect of foot; decreased sensation bilaterally to mid calf however can appreciate light touch; no sores noted  Neurologic	Normal: grossly intact    LABS                          16.9   7.76  )-----------( 176      ( 31 Dec 2018 08:10 )             52.2     12-31    134<L>  |  91<L>  |  63<H>  ----------------------------<  202<H>  3.6   |  29  |  2.20<H>    Ca    9.1      31 Dec 2018 06:34  Phos  5.9     12-30  Mg     2.3     12-31      Hemoglobin A1C, Whole Blood: 11.0 % (12-30 @ 08:26)      CAPILLARY BLOOD GLUCOSE      POCT Blood Glucose.: 184 mg/dL (31 Dec 2018 07:45)  POCT Blood Glucose.: 436 mg/dL (30 Dec 2018 21:07)  POCT Blood Glucose.: 483 mg/dL (30 Dec 2018 21:05)  POCT Blood Glucose.: 388 mg/dL (30 Dec 2018 16:32)  POCT Blood Glucose.: 300 mg/dL (30 Dec 2018 11:50)

## 2018-12-31 NOTE — PROGRESS NOTE ADULT - ATTENDING COMMENTS
Patient seen and examined, agree with the above assessment and plan by JOYCE Mc.  Volume status significantly improved  change bumex to 2mg PO daily  Metalzone 3x a week  abx  endo eval for poorly controlled DM  Pt cleared for DC Patient seen and examined, agree with the above assessment and plan by JOYCE Mc.  Volume status significantly improved  change bumex to 2mg PO daily  Metalzone 3x a week  ECHO w mild-mod LV dysfunction, will medically manage for now  abx  endo eval for poorly controlled DM  Pt cleared for DC

## 2018-12-31 NOTE — DISCHARGE NOTE ADULT - MEDICATION SUMMARY - MEDICATIONS TO STOP TAKING
I will STOP taking the medications listed below when I get home from the hospital:    Lasix 80 mg oral tablet  -- 1 tab(s) by mouth once a day    docusate sodium 100 mg oral capsule  -- 1 cap(s) by mouth 3 times a day    senna oral tablet  -- 2 tab(s) by mouth once a day (at bedtime)    glimepiride 4 mg oral tablet  -- 1 tab(s) by mouth once a day    acetaminophen 325 mg oral tablet  -- 2 tab(s) by mouth every 6 hours, As needed, For Temp greater than 38 C (100.4 F)    Keflex 500 mg oral capsule  -- 1 cap(s) by mouth every 8 hours   -- Finish all this medication unless otherwise directed by prescriber.

## 2018-12-31 NOTE — DISCHARGE NOTE ADULT - CARE PROVIDERS DIRECT ADDRESSES
,DirectAddress_Unknown,DirectAddress_Unknown,[email or web address]

## 2018-12-31 NOTE — DISCHARGE NOTE ADULT - PATIENT PORTAL LINK FT
You can access the [facility] Portal, offered by [facility], by registering with the following website: [email or web address]

## 2018-12-31 NOTE — DISCHARGE NOTE ADULT - HOSPITAL COURSE
63Male  with PMHx  AFIB, HTN, HLD, PVD, CVA, CAD, NSTEMI s/p PCI to mid LAD, prox LCx, distal LCx, Diastolic Heart failure, DM, severe left internal carotid disease s/p CEA presenting with worsening FAYE, LE edema, orthopnea increasing over the past week prior to admission. Pt. also endorses over 15 lb weight gain over the last month and +cough.  - Acute/ Chronic Diastolic CHF in setting of recent tapering of diuretics  pt improved on IV Bumex - switched bumex to 2mg PO daily on 12/31/18 plan to d/c home with metalazone 5mg 3x week  CT chest revealing small R, trace L pleural effusions,  Echo mild-mod LV systolic dysfunction - EF 40%   continue bb, no no acie/arb secondary to CKD   -Pneumonia - IV Antibiotic course - plan on discharge with total of 1 week antibiotic therapy (change to oral antibiotics on discharge)  - cv stable no chest pain or sob. 0 no evidence of acute ischemia/ACS    -  AFIB - stable, rates improved - cont Toprol 150mg XL - CHADS 5 - continue pradaxa   -  CKD/ANT - PO Bumex daily and Metolazone three times a week on discharge    Endocrine evaluated for poorly controlled diabetes (HgbA1C 11.0 on admission) 63Male  with PMHx  AFIB, HTN, HLD, PVD, CVA, CAD, NSTEMI s/p PCI to mid LAD, prox LCx, distal LCx, Diastolic Heart failure, DM, severe left internal carotid disease s/p CEA presenting with worsening FAYE, LE edema, orthopnea increasing over the past week prior to admission. Pt. also endorses over 15 lb weight gain over the last month and +cough.  - Acute/ Chronic Diastolic CHF in setting of recent tapering of diuretics  pt improved on IV Bumex - switched bumex to 2mg PO daily on 12/31/18 plan to d/c home with metalazone 5mg 3x week  CT chest revealing small R, trace L pleural effusions,  Echo mild-mod LV systolic dysfunction - EF 40%   continue bb, no no acie/arb secondary to CKD   -Pneumonia - IV Antibiotic course - plan on discharge with total of 1 week antibiotic therapy (change to oral antibiotics on discharge)  - cv stable no chest pain or sob. 0 no evidence of acute ischemia/ACS    -  AFIB - stable, rates improved - cont Toprol 150mg XL - CHADS 5 - continue pradaxa   -  CKD/ANT - PO Bumex daily and Metolazone three times a week on discharge    Endocrine evaluated for poorly controlled diabetes (HgbA1C 11.0 on admission)  PO diabetic agents stopped - discharged on Lantus QHS and TID Novolog with out patient endocrine follow up

## 2019-01-01 VITALS — WEIGHT: 295.2 LBS

## 2019-01-01 LAB
ANION GAP SERPL CALC-SCNC: 16 MMOL/L — SIGNIFICANT CHANGE UP (ref 5–17)
BUN SERPL-MCNC: 65 MG/DL — HIGH (ref 7–23)
CALCIUM SERPL-MCNC: 9 MG/DL — SIGNIFICANT CHANGE UP (ref 8.4–10.5)
CHLORIDE SERPL-SCNC: 90 MMOL/L — LOW (ref 96–108)
CO2 SERPL-SCNC: 27 MMOL/L — SIGNIFICANT CHANGE UP (ref 22–31)
CREAT SERPL-MCNC: 2.03 MG/DL — HIGH (ref 0.5–1.3)
GLUCOSE BLDC GLUCOMTR-MCNC: 176 MG/DL — HIGH (ref 70–99)
GLUCOSE SERPL-MCNC: 200 MG/DL — HIGH (ref 70–99)
HCT VFR BLD CALC: 53.5 % — HIGH (ref 39–50)
HGB BLD-MCNC: 16.8 G/DL — SIGNIFICANT CHANGE UP (ref 13–17)
MCHC RBC-ENTMCNC: 25.7 PG — LOW (ref 27–34)
MCHC RBC-ENTMCNC: 31.4 GM/DL — LOW (ref 32–36)
MCV RBC AUTO: 81.8 FL — SIGNIFICANT CHANGE UP (ref 80–100)
PLATELET # BLD AUTO: 196 K/UL — SIGNIFICANT CHANGE UP (ref 150–400)
POTASSIUM SERPL-MCNC: 3.2 MMOL/L — LOW (ref 3.5–5.3)
POTASSIUM SERPL-SCNC: 3.2 MMOL/L — LOW (ref 3.5–5.3)
RBC # BLD: 6.54 M/UL — HIGH (ref 4.2–5.8)
RBC # FLD: 15.8 % — HIGH (ref 10.3–14.5)
SODIUM SERPL-SCNC: 133 MMOL/L — LOW (ref 135–145)
WBC # BLD: 10.16 K/UL — SIGNIFICANT CHANGE UP (ref 3.8–10.5)
WBC # FLD AUTO: 10.16 K/UL — SIGNIFICANT CHANGE UP (ref 3.8–10.5)

## 2019-01-01 PROCEDURE — 99285 EMERGENCY DEPT VISIT HI MDM: CPT | Mod: 25

## 2019-01-01 PROCEDURE — 83735 ASSAY OF MAGNESIUM: CPT

## 2019-01-01 PROCEDURE — 71250 CT THORAX DX C-: CPT

## 2019-01-01 PROCEDURE — 83880 ASSAY OF NATRIURETIC PEPTIDE: CPT

## 2019-01-01 PROCEDURE — 83036 HEMOGLOBIN GLYCOSYLATED A1C: CPT

## 2019-01-01 PROCEDURE — 96374 THER/PROPH/DIAG INJ IV PUSH: CPT

## 2019-01-01 PROCEDURE — 80053 COMPREHEN METABOLIC PANEL: CPT

## 2019-01-01 PROCEDURE — 80048 BASIC METABOLIC PNL TOTAL CA: CPT

## 2019-01-01 PROCEDURE — 71045 X-RAY EXAM CHEST 1 VIEW: CPT

## 2019-01-01 PROCEDURE — 84100 ASSAY OF PHOSPHORUS: CPT

## 2019-01-01 PROCEDURE — 82962 GLUCOSE BLOOD TEST: CPT

## 2019-01-01 PROCEDURE — 84484 ASSAY OF TROPONIN QUANT: CPT

## 2019-01-01 PROCEDURE — 83605 ASSAY OF LACTIC ACID: CPT

## 2019-01-01 PROCEDURE — 93005 ELECTROCARDIOGRAM TRACING: CPT

## 2019-01-01 PROCEDURE — 85027 COMPLETE CBC AUTOMATED: CPT

## 2019-01-01 PROCEDURE — 84443 ASSAY THYROID STIM HORMONE: CPT

## 2019-01-01 PROCEDURE — C8929: CPT

## 2019-01-01 RX ORDER — GLIMEPIRIDE 1 MG
1 TABLET ORAL
Qty: 0 | Refills: 0 | COMMUNITY

## 2019-01-01 RX ORDER — BUMETANIDE 0.25 MG/ML
1 INJECTION INTRAMUSCULAR; INTRAVENOUS
Qty: 30 | Refills: 0
Start: 2019-01-01

## 2019-01-01 RX ORDER — METOPROLOL TARTRATE 50 MG
3 TABLET ORAL
Qty: 90 | Refills: 0
Start: 2019-01-01 | End: 2019-01-30

## 2019-01-01 RX ORDER — FUROSEMIDE 40 MG
1 TABLET ORAL
Qty: 0 | Refills: 0 | COMMUNITY

## 2019-01-01 RX ORDER — POTASSIUM CHLORIDE 20 MEQ
40 PACKET (EA) ORAL EVERY 4 HOURS
Qty: 0 | Refills: 0 | Status: DISCONTINUED | OUTPATIENT
Start: 2019-01-01 | End: 2019-01-01

## 2019-01-01 RX ADMIN — Medication 1: at 08:22

## 2019-01-01 RX ADMIN — DABIGATRAN ETEXILATE MESYLATE 150 MILLIGRAM(S): 150 CAPSULE ORAL at 05:25

## 2019-01-01 RX ADMIN — Medication 150 MILLIGRAM(S): at 05:25

## 2019-01-01 RX ADMIN — BUMETANIDE 2 MILLIGRAM(S): 0.25 INJECTION INTRAMUSCULAR; INTRAVENOUS at 05:25

## 2019-01-01 RX ADMIN — Medication 40 MILLIEQUIVALENT(S): at 10:17

## 2019-01-01 RX ADMIN — Medication 7 UNIT(S): at 08:23

## 2019-01-01 RX ADMIN — Medication 100 MILLIGRAM(S): at 05:25

## 2019-01-01 NOTE — PROGRESS NOTE ADULT - REASON FOR ADMISSION
sob/cough

## 2019-01-01 NOTE — PROGRESS NOTE ADULT - ASSESSMENT
63 year old male with PMHx  AFIB, HTN, HLD, PVD, CVA, CKD, CAD, NSTEMI s/p PCI to mid LAD, prox LCx, distal LCx, Diastolic Heart failure, DM, severe left internal carotid disease s/p CEA presenting with Acute/chronic diastolic CHF.     1. Acute/ Chronic Diastolic CHF   CT chest revealing small R, trace L pleural effusions, Pbnp elevated   diuresis per Cardio, likely de-escalate tomorrow  Cr trending up slightly, monitor  continue bb, no no acie/arb secondary to CKD   check echo to re-evaluate LV function, valvular disease     2. PNA  c/w IV abx   Pulm appreciated    3. CAD s/p PCI  cv stable   no evidence of acute ischemia/ACS    continue ASA    4. AFIB  stable, rate controlled  continue with BB  CHADS 5 - continue pradaxa     5. HTN  bp stable   continue current medication regimen    ANT - Renal appreciated, will likely need to decrease diuresis soon    dvt ppx
63Male  with PMHx  AFIB, HTN, HLD, PVD, CVA, CAD, NSTEMI s/p PCI to mid LAD, prox LCx, distal LCx, Diastolic Heart failure, DM, severe left internal carotid disease s/p CEA presenting with worsening FAYE, LE edema, orthopnea increasing over the past week. with  15 lb weight gain over the last month and +cough. dx with pna and chf    1- ANT on ckd III  2- chf  3- pneumonia   4- HTN  5-  ai fib     ant in setting of increase diuretic use along with infection/pna  cr higher. overall pulm status improving   daily weight   low sodium diet  cont bumex and metolazone  cont zithromax and rocephin
63 year old male with PMHx  AFIB, HTN, HLD, PVD, CVA, CKD, CAD, NSTEMI s/p PCI to mid LAD, prox LCx, distal LCx, Diastolic Heart failure, DM, severe left internal carotid disease s/p CEA presenting with Acute/chronic diastolic CHF.     1. Acute/ Chronic Diastolic CHF   CT chest revealing small R, trace L pleural effusions, Pbnp elevated   diuresis per Cardio  Cr up, Renal appreciated  continue bb, no no acie/arb secondary to CKD   check echo to re-evaluate LV function, valvular disease     2. PNA  c/w IV abx   Pulm appreciated    3. CAD s/p PCI  cv stable   no evidence of acute ischemia/ACS    continue ASA    4. AFIB  stable, rate controlled  continue with BB  CHADS 5 - continue pradaxa     5. HTN  bp stable   continue current medication regimen    ANT - Renal appreciated, will likely need to decrease diuresis soon    dvt ppx
63 year old male with PMHx  AFIB, HTN, HLD, PVD, CVA, CKD, CAD, NSTEMI s/p PCI to mid LAD, prox LCx, distal LCx, Diastolic Heart failure, DM, severe left internal carotid disease s/p CEA presenting with Acute/chronic diastolic CHF.     1. Acute/ Chronic Diastolic CHF   CT chest revealing small R, trace L pleural effusions, Pbnp elevated   diuresis per Cardio, likely de-escalate tomorrow  Cr trending up slightly, monitor  continue bb, no no acie/arb secondary to CKD     2. PNA  transition to PO abx  Pulm appreciated    3. CAD s/p PCI  cv stable   no evidence of acute ischemia/ACS    continue ASA    4. AFIB  stable, rate controlled  continue with BB  CHADS 5 - continue pradaxa     5. HTN  bp stable   continue current medication regimen    ANT - Renal appreciated, improvibg    dvt ppx     DM2 - per Endo    d/c planning
63 year old male with PMHx  AFIB, HTN, HLD, PVD, CVA, CKD, CAD, NSTEMI s/p PCI to mid LAD, prox LCx, distal LCx, Diastolic Heart failure, DM, severe left internal carotid disease s/p CEA presenting with Acute/chronic diastolic CHF.     1. Acute/ Chronic Diastolic CHF   CT chest revealing small R, trace L pleural effusions, Pbnp elevated   diuresis per Cardio, likely de-escalate tomorrow  Cr trending up slightly, monitor  continue bb, no no acie/arb secondary to CKD     2. PNA  transition to PO abx  Pulm appreciated    3. CAD s/p PCI  cv stable   no evidence of acute ischemia/ACS    continue ASA    4. AFIB  stable, rate controlled  continue with BB  CHADS 5 - continue pradaxa     5. HTN  bp stable   continue current medication regimen    ANT - Renal appreciated, improving    dvt ppx     DM2 - Insulin regimen per Endo    d/c planning
63 year old male with PMHx  AFIB, HTN, HLD, PVD, CVA, CKD, CAD, NSTEMI s/p PCI to mid LAD, prox LCx, distal LCx, Diastolic Heart failure, DM, severe left internal carotid disease s/p CEA presenting with Acute/chronic diastolic CHF.     1. Acute/ Chronic Diastolic CHF   in setting of recent tapering of diuretics   +fluid overload on exam, improved s/p IV lasix in ED  CT chest revealing small R, trace L pleural effusions, Pbnp elevated   Cr elevated, urine outpt not significant despite high dose lasix  will switch to BUMEX   reeval LV function on ECHO  continue bb, no no acie/arb secondary to CKD       2. PNA   on IV abx   infectious w/u in progress  med f/u , pulm f/u     3. CAD s/p PCI  cv stable no chest pain or sob.  no evidence of acute ischemia/ACS    continue ASA    4. AFIB  stable, rates elevated  adjust BB  CHADS 5 - continue pradaxa     5. HTN  bp stable   continue current medication regimen    6. CKD/ANT  sec to hf    dvt ppx
63 year old male with PMHx  AFIB, HTN, HLD, PVD, CVA, CKD, CAD, NSTEMI s/p PCI to mid LAD, prox LCx, distal LCx, Diastolic Heart failure, DM, severe left internal carotid disease s/p CEA presenting with Acute/chronic diastolic CHF.     1. Acute/ Chronic Diastolic CHF   in setting of recent tapering of diuretics   pt improved on Bumex  will taper dose down to daily amd reevaluate tomorrow, likely switch to PO  CT chest revealing small R, trace L pleural effusions, Pbnp elevated   Cr stablizing   reeval LV function on ECHO  continue bb, no no acie/arb secondary to CKD       2. PNA   on IV abx   infectious w/u in progress  med f/u , pulm f/u     3. CAD s/p PCI  cv stable no chest pain or sob.  no evidence of acute ischemia/ACS    continue ASA    4. AFIB  stable, rates improved  cont Toprol 150mg XL  CHADS 5 - continue pradaxa     5. HTN  bp stable   continue current medication regimen    6. CKD/ANT  sec to hf    dvt ppx
63 year old male with PMHx  AFIB, HTN, HLD, PVD, CVA, CKD, CAD, NSTEMI s/p PCI to mid LAD, prox LCx, distal LCx, Diastolic Heart failure, DM, severe left internal carotid disease s/p CEA presenting with Acute/chronic diastolic CHF.     1. Acute/ Chronic Diastolic CHF   in setting of recent tapering of diuretics  cont bumex 2mg PO daily, d/c home with metalazone 5mg 3x week  CT chest revealing small R, trace L pleural effusions, Pbnp elevated   Cr stabilizing   Echo mild-mod LV systolic dysfunction - EF 40%, interval change from prior, will manage medically for now and consider repeat ischemic eval, pt asymptomatic   continue bb, no no acie/arb secondary to CKD     2. PNA   on IV abx   med f/u , pulm f/u     3. CAD s/p PCI  cv stable no chest pain or sob.  no evidence of acute ischemia/ACS    continue ASA    4. AFIB  stable, rates improved  cont Toprol 150mg XL  CHADS 5 - continue pradaxa     5. HTN  bp stable   continue current medication regimen    6. CKD/ANT  sec to hf, improving     7. Diabetes mellitus-uncontrolled  insulin per endo    dvt ppx
63 year old male with PMHx  AFIB, HTN, HLD, PVD, CVA, CKD, CAD, NSTEMI s/p PCI to mid LAD, prox LCx, distal LCx, Diastolic Heart failure, DM, severe left internal carotid disease s/p CEA presenting with Acute/chronic diastolic CHF.     1. Acute/ Chronic Diastolic CHF   in setting of recent tapering of diuretics  pt improved on Bumex  switch bumex to 2mg PO daily, d/c home with metalazone 5mg 3x week  CT chest revealing small R, trace L pleural effusions, Pbnp elevated   Cr stabilizing   Echo mild-mod LV systolic dysfunction - EF 40%   continue bb, no no acie/arb secondary to CKD     2. PNA   on IV abx   med f/u , pulm f/u     3. CAD s/p PCI  cv stable no chest pain or sob.  no evidence of acute ischemia/ACS    continue ASA    4. AFIB  stable, rates improved  cont Toprol 150mg XL  CHADS 5 - continue pradaxa     5. HTN  bp stable   continue current medication regimen    6. CKD/ANT  sec to hf, improving     dvt ppx
63Male  with PMHx  AFIB, HTN, HLD, PVD, CVA, CAD, NSTEMI s/p PCI to mid LAD, prox LCx, distal LCx, Diastolic Heart failure, DM, severe left internal carotid disease s/p CEA presenting with worsening FAYE, LE edema, orthopnea increasing over the past week. with  15 lb weight gain over the last month and +cough. dx with pna and chf    1- ANT on ckd III  2- chf  3- pneumonia   4- HTN  5-  ai fib     ant in setting of increase diuretic use along with infection/pna  cr now improving  cont bumex change to 2 mg po bid with metolazone 2.5 mg tiw  cont metoprolol
Impression :   CHF better  Possible pneumonia as well  CKD  DM      Recommend :   Can place on oral antibiotics for a total of one week of therapy  Treatment of CHF as per Cardiology      Jayson Faustin MD, FCCP  [facility]

## 2019-01-01 NOTE — CHART NOTE - NSCHARTNOTEFT_GEN_A_CORE
Patient medically cleared for discharge as per Dr Daley (medicine), Dr Mazariegos (cardiology) and Endocrine -   Endocrine fellow contacted earlier to confirm discharge and discharge insulin regimen (Lantus (Basaglar) 21 units SQ QHS and Novolog 8 units TID before meals)-   Patient states he and his daughter have received diabetic education including administration of insulin.  He claims he already has a glucometer at home and is able to check and monitor his glucoses.

## 2019-01-01 NOTE — PROGRESS NOTE ADULT - SUBJECTIVE AND OBJECTIVE BOX
CHIEF COMPLAINT:Patient is a 63y old  Male who presents with a chief complaint of sob/cough (29 Dec 2018 14:35)  feeling better    Allergies:  No Known Allergies      PAST MEDICAL & SURGICAL HISTORY:  CAD (coronary artery disease)  MI (myocardial infarction): circa 2014  Atrial fibrillation  TIA (transient ischemic attack)  DM type 2 (diabetes mellitus, type 2)  HLD (hyperlipidemia)  HTN (hypertension), benign  S/P carotid endarterectomy: left  Status post angioplasty with stent: LULU x 3 2/7/2014      FAMILY HISTORY:  Family history of heart disease      REVIEW OF SYSTEMS:  CONSTITUTIONAL: No fever, weight loss, or fatigue  EYES: No eye pain, visual disturbances, or discharge  NECK: No pain or stiffness  RESPIRATORY: No cough or wheezing, improving shortness of breath  CARDIOVASCULAR: No chest pain, palpitations, dizziness, + leg swelling  GASTROINTESTINAL: No abdominal or epigastric pain. No nausea, vomiting, diarrhea or constipation  GENITOURINARY: No dysuria, urinary frequency or urgency, no hematuria  NEUROLOGICAL: No headaches, memory loss, loss of strength, numbness, or tremors  SKIN: No itching, burning, rashes, or lesions   MUSCULOSKELETAL: No joint pain or swelling; No muscle, back, or extremity pain      Medications:  MEDICATIONS  (STANDING):  aspirin enteric coated 81 milliGRAM(s) Oral daily  azithromycin  IVPB 500 milliGRAM(s) IV Intermittent every 24 hours  cefTRIAXone   IVPB 1 Gram(s) IV Intermittent every 24 hours  dabigatran 150 milliGRAM(s) Oral every 12 hours  dextrose 5%. 1000 milliLiter(s) (50 mL/Hr) IV Continuous <Continuous>  dextrose 50% Injectable 12.5 Gram(s) IV Push once  dextrose 50% Injectable 25 Gram(s) IV Push once  dextrose 50% Injectable 25 Gram(s) IV Push once  influenza   Vaccine 0.5 milliLiter(s) IntraMuscular once  insulin lispro (HumaLOG) corrective regimen sliding scale   SubCutaneous at bedtime  insulin lispro (HumaLOG) corrective regimen sliding scale   SubCutaneous three times a day before meals  metolazone 5 milliGRAM(s) Oral daily  metoprolol succinate  milliGRAM(s) Oral daily  pregabalin 100 milliGRAM(s) Oral two times a day    MEDICATIONS  (PRN):  dextrose 40% Gel 15 Gram(s) Oral once PRN Blood Glucose LESS THAN 70 milliGRAM(s)/deciliter  glucagon  Injectable 1 milliGRAM(s) IntraMuscular once PRN Glucose LESS THAN 70 milligrams/deciliter    	    PHYSICAL EXAM:  T(C): 36.6 (12-30-18 @ 04:04), Max: 37.1 (12-29-18 @ 20:28)  HR: 65 (12-30-18 @ 04:04) (61 - 87)  BP: 144/75 (12-30-18 @ 04:04) (119/72 - 153/78)  RR: 18 (12-30-18 @ 04:04) (17 - 18)  SpO2: 98% (12-30-18 @ 04:04) (94% - 98%)  Wt(kg): --  I&O's Summary    29 Dec 2018 07:01  -  30 Dec 2018 07:00  --------------------------------------------------------  IN: 700 mL / OUT: 1980 mL / NET: -1280 mL    30 Dec 2018 07:01  -  30 Dec 2018 11:02  --------------------------------------------------------  IN: 0 mL / OUT: 400 mL / NET: -400 mL      Appearance: Normal	  HEENT:   NCAT, PERRL, EOMI	  Lymphatic: No lymphadenopathy  Cardiovascular: Normal S1 S2, RRR  Respiratory: Lungs clear to auscultation BL  Psychiatry: A & O x 3, Mood & affect appropriate  Gastrointestinal:  Soft, Non-tender, + BS  Skin: No rashes, No ecchymoses, No cyanosis	  Neurologic: Non-focal  Extremities: Normal range of motion, No clubbing, cyanosis or edema    LABS:	 	    CARDIAC MARKERS:                                16.4   7.78  )-----------( 156      ( 30 Dec 2018 08:26 )             52.1     12-30    134<L>  |  91<L>  |  64<H>  ----------------------------<  280<H>  3.8   |  29  |  2.51<H>    Ca    9.1      30 Dec 2018 05:37  Phos  5.9     12-30  Mg     2.1     12-30    TPro  7.6  /  Alb  3.6  /  TBili  0.6  /  DBili  x   /  AST  9<L>  /  ALT  7<L>  /  AlkPhos  104  12-28    proBNP:   Lipid Profile:   HgA1c:   TSH:
[facility]   [phone]  RENAL FOLLOW UP NOTE  --------------------------------------------------------------------------------  Chief Complaint:    24 hour events/subjective:    seen earlier. family at bedside   states breathing has improved significantly       PAST HISTORY  --------------------------------------------------------------------------------  No significant changes to PMH, PSH, FHx, SHx, unless otherwise noted    ALLERGIES & MEDICATIONS  --------------------------------------------------------------------------------  Allergies    No Known Allergies    Intolerances      Standing Inpatient Medications  aspirin enteric coated 81 milliGRAM(s) Oral daily  azithromycin  IVPB 500 milliGRAM(s) IV Intermittent every 24 hours  cefTRIAXone   IVPB 1 Gram(s) IV Intermittent every 24 hours  dabigatran 150 milliGRAM(s) Oral every 12 hours  dextrose 5%. 1000 milliLiter(s) IV Continuous <Continuous>  dextrose 50% Injectable 12.5 Gram(s) IV Push once  dextrose 50% Injectable 25 Gram(s) IV Push once  dextrose 50% Injectable 25 Gram(s) IV Push once  influenza   Vaccine 0.5 milliLiter(s) IntraMuscular once  insulin glargine Injectable (LANTUS) 21 Unit(s) SubCutaneous at bedtime  insulin lispro (HumaLOG) corrective regimen sliding scale   SubCutaneous at bedtime  insulin lispro (HumaLOG) corrective regimen sliding scale   SubCutaneous three times a day before meals  insulin lispro Injectable (HumaLOG) 7 Unit(s) SubCutaneous three times a day before meals  metolazone 5 milliGRAM(s) Oral daily  metoprolol succinate  milliGRAM(s) Oral daily  pregabalin 100 milliGRAM(s) Oral two times a day    PRN Inpatient Medications  dextrose 40% Gel 15 Gram(s) Oral once PRN  glucagon  Injectable 1 milliGRAM(s) IntraMuscular once PRN      REVIEW OF SYSTEMS  --------------------------------------------------------------------------------    Gen: denies fevers/chills,  CVS: denies chest pain/palpitations  Resp: denies SOB/Cough  GI: Denies N/V/Abd pain  : Denies dysuria/oliguria/hematuria    All other systems were reviewed and are negative, except as noted.    VITALS/PHYSICAL EXAM  --------------------------------------------------------------------------------  T(C): 36.3 (12-30-18 @ 11:42), Max: 37.1 (12-29-18 @ 20:28)  HR: 87 (12-30-18 @ 11:42) (61 - 87)  BP: 141/87 (12-30-18 @ 11:42) (141/87 - 153/78)  RR: 18 (12-30-18 @ 11:42) (17 - 18)  SpO2: 95% (12-30-18 @ 11:42) (94% - 98%)  Wt(kg): --    Weight (kg): 36.7 (12-28-18 @ 19:01)      12-29-18 @ 07:01  -  12-30-18 @ 07:00  --------------------------------------------------------  IN: 700 mL / OUT: 1980 mL / NET: -1280 mL    12-30-18 @ 07:01  -  12-30-18 @ 18:59  --------------------------------------------------------  IN: 0 mL / OUT: 400 mL / NET: -400 mL      Physical Exam:    Gen: in supine position. on O2   	+  jvd , supple neck,   	Pulm: decrease bs  no rales or ronchi or wheezing  	CV: RRR, S1S2; no rub  	Back: No CVA tenderness  	Abd: +BS, soft, nontender/nondistended  	: No suprapubic tenderness  	UE: Warm, no cyanosis  no clubbing,  chronic stasis changes. with 1+ edema   	LE: Warm, no cyanosis  no clubbing, no edema  	Neuro: alert and oriented. speech coherent 	    	  LABS/STUDIES  --------------------------------------------------------------------------------              16.4   7.78  >-----------<  156      [12-30-18 @ 08:26]              52.1     134  |  91  |  64  ----------------------------<  280      [12-30-18 @ 05:37]  3.8   |  29  |  2.51        Ca     9.1     [12-30-18 @ 05:37]      Mg     2.1     [12-30-18 @ 05:37]      Phos  5.9     [12-30-18 @ 05:37]            Creatinine Trend:  SCr 2.51 [12-30 @ 05:37]  SCr 2.40 [12-29 @ 05:59]  SCr 2.00 [12-28 @ 12:58]  SCr 2.13 [12-28 @ 11:15]                  HbA1c 11.0      [12-30-18 @ 08:26]  TSH 1.71      [12-28-18 @ 14:58]
"Better" Able to sleep flat    VS Stable Afeb    Lungs- decreased BS at bases  Heart- Irr R&R  Abd- non tender  Ext- no edema  chronic changes    BUN 64 crtn2.5    IMP: Resp status improved; post diuresis (diastolic CHF) with resp infection on antibx  CKD with mild further increase in BUN/crtn    PLAN: Continue antibx  As per cardiology  OOB    Jose Mclean MD [facility]  [phone]  (fanny/Ramin/Cuong)
CARDIOLOGY FOLLOW UP - Dr. Mazariegos    CC no cp/sob  LE edema improved, pt. states he lost "14 lbs since admission"       PHYSICAL EXAM:  T(C): 36.4 (12-31-18 @ 04:25), Max: 36.6 (12-30-18 @ 20:32)  HR: 67 (12-31-18 @ 04:25) (67 - 87)  BP: 126/71 (12-31-18 @ 04:25) (126/71 - 154/83)  RR: 17 (12-31-18 @ 04:25) (17 - 18)  SpO2: 94% (12-31-18 @ 04:25) (94% - 96%)  Wt(kg): --  I&O's Summary    30 Dec 2018 07:01  -  31 Dec 2018 07:00  --------------------------------------------------------  IN: 460 mL / OUT: 2230 mL / NET: -1770 mL    31 Dec 2018 07:01  -  31 Dec 2018 10:28  --------------------------------------------------------  IN: 0 mL / OUT: 550 mL / NET: -550 mL        Appearance: Normal	  Cardiovascular: Normal S1 S2,irreg, No JVD, No murmurs  Respiratory: Lungs clear to auscultation	  Gastrointestinal:  Soft, Non-tender, + BS	  Extremities: Normal range of motion, No clubbing, cyanosis or edema        MEDICATIONS  (STANDING):  aspirin enteric coated 81 milliGRAM(s) Oral daily  azithromycin  IVPB 500 milliGRAM(s) IV Intermittent every 24 hours  buMETAnide Injectable 2 milliGRAM(s) IV Push daily  cefTRIAXone   IVPB 1 Gram(s) IV Intermittent every 24 hours  dabigatran 150 milliGRAM(s) Oral every 12 hours  dextrose 5%. 1000 milliLiter(s) (50 mL/Hr) IV Continuous <Continuous>  dextrose 50% Injectable 12.5 Gram(s) IV Push once  dextrose 50% Injectable 25 Gram(s) IV Push once  dextrose 50% Injectable 25 Gram(s) IV Push once  influenza   Vaccine 0.5 milliLiter(s) IntraMuscular once  insulin glargine Injectable (LANTUS) 21 Unit(s) SubCutaneous at bedtime  insulin lispro (HumaLOG) corrective regimen sliding scale   SubCutaneous at bedtime  insulin lispro (HumaLOG) corrective regimen sliding scale   SubCutaneous three times a day before meals  insulin lispro Injectable (HumaLOG) 7 Unit(s) SubCutaneous three times a day before meals  metolazone 5 milliGRAM(s) Oral daily  metoprolol succinate  milliGRAM(s) Oral daily  pregabalin 100 milliGRAM(s) Oral two times a day      TELEMETRY: af     ECG:  	  RADIOLOGY:   DIAGNOSTIC TESTING:  [ ] Echocardiogram:  [ ]  Catheterization:  [ ] Stress Test:    OTHER: 	    LABS:	 	                                16.9   7.76  )-----------( 176      ( 31 Dec 2018 08:10 )             52.2     12-31    134<L>  |  91<L>  |  63<H>  ----------------------------<  202<H>  3.6   |  29  |  2.20<H>    Ca    9.1      31 Dec 2018 06:34  Phos  5.9     12-30  Mg     2.3     12-31
CC: discharge postponed pending optimization of diabetes mgmt, no events, no complaints.    TELEMETRY:     PHYSICAL EXAM:    T(C): 36.7 (01-01-19 @ 04:33), Max: 36.7 (01-01-19 @ 04:33)  HR: 85 (01-01-19 @ 04:33) (61 - 89)  BP: 125/71 (01-01-19 @ 04:33) (104/77 - 133/82)  RR: 18 (01-01-19 @ 04:33) (18 - 18)  SpO2: 94% (01-01-19 @ 04:33) (94% - 94%)  Wt(kg): --  I&O's Summary    31 Dec 2018 07:01  -  01 Jan 2019 07:00  --------------------------------------------------------  IN: 780 mL / OUT: 1350 mL / NET: -570 mL    01 Jan 2019 07:01  -  01 Jan 2019 09:53  --------------------------------------------------------  IN: 240 mL / OUT: 0 mL / NET: 240 mL        Appearance: Normal	  Cardiovascular: Normal S1 S2,RRR, No JVD, No murmurs  Respiratory: Lungs clear to auscultation	  Gastrointestinal:  Soft, Non-tender, + BS	  Extremities: Normal range of motion, No clubbing, cyanosis or edema  Vascular: Peripheral pulses palpable 2+ bilaterally     LABS:	 	                          16.8   10.16 )-----------( 196      ( 01 Jan 2019 09:18 )             53.5     01-01    133<L>  |  90<L>  |  65<H>  ----------------------------<  200<H>  3.2<L>   |  27  |  2.03<H>    Ca    9.0      01 Jan 2019 05:54  Mg     2.3     12-31            CARDIAC MARKERS:
CC: pt feeling better, orthopnea resolved    TELEMETRY: AF    PHYSICAL EXAM:    T(C): 36.6 (12-30-18 @ 04:04), Max: 37.1 (12-29-18 @ 20:28)  HR: 65 (12-30-18 @ 04:04) (60 - 87)  BP: 144/75 (12-30-18 @ 04:04) (119/72 - 153/78)  RR: 18 (12-30-18 @ 04:04) (17 - 18)  SpO2: 98% (12-30-18 @ 04:04) (94% - 98%)  Wt(kg): --  I&O's Summary    29 Dec 2018 07:01  -  30 Dec 2018 07:00  --------------------------------------------------------  IN: 700 mL / OUT: 1980 mL / NET: -1280 mL    30 Dec 2018 07:01  -  30 Dec 2018 09:48  --------------------------------------------------------  IN: 0 mL / OUT: 400 mL / NET: -400 mL        Appearance: Normal	  Cardiovascular: Normal S1 S2,RRR, No JVD, No murmurs  Respiratory: Lungs clear to auscultation	  Gastrointestinal:  Soft, Non-tender, + BS	  Extremities: Normal range of motion, No clubbing, cyanosis or edema  Vascular: Peripheral pulses palpable 2+ bilaterally     LABS:	 	                          16.4   7.78  )-----------( 156      ( 30 Dec 2018 08:26 )             52.1     12-30    134<L>  |  91<L>  |  64<H>  ----------------------------<  280<H>  3.8   |  29  |  2.51<H>    Ca    9.1      30 Dec 2018 05:37  Phos  5.9     12-30  Mg     2.1     12-30    TPro  7.6  /  Alb  3.6  /  TBili  0.6  /  DBili  x   /  AST  9<L>  /  ALT  7<L>  /  AlkPhos  104  12-28          CARDIAC MARKERS:        MEDICATIONS  (STANDING):  aspirin enteric coated 81 milliGRAM(s) Oral daily  azithromycin  IVPB 500 milliGRAM(s) IV Intermittent every 24 hours  cefTRIAXone   IVPB 1 Gram(s) IV Intermittent every 24 hours  dabigatran 150 milliGRAM(s) Oral every 12 hours  dextrose 5%. 1000 milliLiter(s) (50 mL/Hr) IV Continuous <Continuous>  dextrose 50% Injectable 12.5 Gram(s) IV Push once  dextrose 50% Injectable 25 Gram(s) IV Push once  dextrose 50% Injectable 25 Gram(s) IV Push once  influenza   Vaccine 0.5 milliLiter(s) IntraMuscular once  insulin lispro (HumaLOG) corrective regimen sliding scale   SubCutaneous at bedtime  insulin lispro (HumaLOG) corrective regimen sliding scale   SubCutaneous three times a day before meals  metolazone 5 milliGRAM(s) Oral daily  metoprolol succinate  milliGRAM(s) Oral daily  pregabalin 100 milliGRAM(s) Oral two times a day
CC: still complaining of orthopnea    TELEMETRY: ;s    PHYSICAL EXAM:    T(C): 37.8 (12-29-18 @ 03:50), Max: 37.8 (12-29-18 @ 03:50)  HR: 92 (12-29-18 @ 03:50) (84 - 104)  BP: 149/71 (12-29-18 @ 03:50) (102/69 - 170/88)  RR: 18 (12-29-18 @ 03:50) (16 - 24)  SpO2: 100% (12-29-18 @ 03:50) (93% - 100%)  Wt(kg): --  I&O's Summary    28 Dec 2018 07:01  -  29 Dec 2018 07:00  --------------------------------------------------------  IN: 520 mL / OUT: 400 mL / NET: 120 mL    29 Dec 2018 07:01  -  29 Dec 2018 09:41  --------------------------------------------------------  IN: 0 mL / OUT: 500 mL / NET: -500 mL        Appearance: Normal	  Cardiovascular: Normal S1 S2,RRR, No JVD, No murmurs  Respiratory: Lungs clear to auscultation	  Gastrointestinal:  Soft, Non-tender, + BS	  Extremities: + edema  Vascular: Peripheral pulses palpable 2+ bilaterally     LABS:	 	                          15.9   10.17 )-----------( 191      ( 29 Dec 2018 07:10 )             50.6     12-29    137  |  92<L>  |  53<H>  ----------------------------<  206<H>  3.7   |  27  |  2.40<H>    Ca    9.2      29 Dec 2018 05:59    TPro  7.6  /  Alb  3.6  /  TBili  0.6  /  DBili  x   /  AST  9<L>  /  ALT  7<L>  /  AlkPhos  104  12-28          CARDIAC MARKERS:
CHIEF COMPLAINT:Patient is a 63y old  Male who presents with a chief complaint of sob/cough (29 Dec 2018 14:35)      Allergies:  No Known Allergies      PAST MEDICAL & SURGICAL HISTORY:  CAD (coronary artery disease)  MI (myocardial infarction): circa 2014  Atrial fibrillation  TIA (transient ischemic attack)  DM type 2 (diabetes mellitus, type 2)  HLD (hyperlipidemia)  HTN (hypertension), benign  S/P carotid endarterectomy: left  Status post angioplasty with stent: LULU x 3 2/7/2014      FAMILY HISTORY:  Family history of heart disease      REVIEW OF SYSTEMS:  CONSTITUTIONAL: No fever, weight loss, or fatigue  EYES: No eye pain, visual disturbances, or discharge  NECK: No pain or stiffness  RESPIRATORY: No cough or wheezing, + shortness of breath  CARDIOVASCULAR: No chest pain, palpitations, dizziness, + leg swelling  GASTROINTESTINAL: No abdominal or epigastric pain. No nausea, vomiting, diarrhea or constipation  GENITOURINARY: No dysuria, urinary frequency or urgency, no hematuria  NEUROLOGICAL: No headaches, memory loss, loss of strength, numbness, or tremors  SKIN: No itching, burning, rashes, or lesions   MUSCULOSKELETAL: No joint pain or swelling; No muscle, back, or extremity pain    Medications:  MEDICATIONS  (STANDING):  aspirin enteric coated 81 milliGRAM(s) Oral daily  azithromycin  IVPB 500 milliGRAM(s) IV Intermittent every 24 hours  buMETAnide Injectable 2 milliGRAM(s) IV Push every 12 hours  cefTRIAXone   IVPB 1 Gram(s) IV Intermittent every 24 hours  dabigatran 150 milliGRAM(s) Oral every 12 hours  dextrose 5%. 1000 milliLiter(s) (50 mL/Hr) IV Continuous <Continuous>  dextrose 50% Injectable 12.5 Gram(s) IV Push once  dextrose 50% Injectable 25 Gram(s) IV Push once  dextrose 50% Injectable 25 Gram(s) IV Push once  influenza   Vaccine 0.5 milliLiter(s) IntraMuscular once  insulin lispro (HumaLOG) corrective regimen sliding scale   SubCutaneous three times a day before meals  metolazone 5 milliGRAM(s) Oral daily  metoprolol succinate  milliGRAM(s) Oral daily  pregabalin 100 milliGRAM(s) Oral two times a day    MEDICATIONS  (PRN):  dextrose 40% Gel 15 Gram(s) Oral once PRN Blood Glucose LESS THAN 70 milliGRAM(s)/deciliter  glucagon  Injectable 1 milliGRAM(s) IntraMuscular once PRN Glucose LESS THAN 70 milligrams/deciliter    	    PHYSICAL EXAM:  T(C): 37.1 (12-29-18 @ 20:28), Max: 37.8 (12-29-18 @ 03:50)  HR: 61 (12-29-18 @ 20:28) (60 - 92)  BP: 153/78 (12-29-18 @ 20:28) (119/72 - 153/78)  RR: 18 (12-29-18 @ 20:28) (17 - 18)  SpO2: 96% (12-29-18 @ 20:28) (93% - 100%)  Wt(kg): --  I&O's Summary    28 Dec 2018 07:01  -  29 Dec 2018 07:00  --------------------------------------------------------  IN: 520 mL / OUT: 400 mL / NET: 120 mL    29 Dec 2018 07:01  -  29 Dec 2018 21:04  --------------------------------------------------------  IN: 460 mL / OUT: 500 mL / NET: -40 mL        Appearance: Normal	  HEENT:   NCAT, PERRL, EOMI	  Lymphatic: No lymphadenopathy  Cardiovascular: Normal S1 S2, RRR  Respiratory: Lungs clear to auscultation BL  Psychiatry: A & O x 3, Mood & affect appropriate  Gastrointestinal:  Soft, Non-tender, + BS  Skin: No rashes, No ecchymoses, No cyanosis	  Neurologic: Non-focal  Extremities: Normal range of motion, No clubbing, cyanosis or edema    	  LABS:	 	    CARDIAC MARKERS:                                15.9   10.17 )-----------( 191      ( 29 Dec 2018 07:10 )             50.6     12-29    137  |  92<L>  |  53<H>  ----------------------------<  206<H>  3.7   |  27  |  2.40<H>    Ca    9.2      29 Dec 2018 05:59    TPro  7.6  /  Alb  3.6  /  TBili  0.6  /  DBili  x   /  AST  9<L>  /  ALT  7<L>  /  AlkPhos  104  12-28    proBNP:   Lipid Profile:   HgA1c:   TSH:
CHIEF COMPLAINT:Patient is a 63y old  Male who presents with a chief complaint of sob/cough (29 Dec 2018 14:35)  feeling well    Allergies:  No Known Allergies      PAST MEDICAL & SURGICAL HISTORY:  CAD (coronary artery disease)  MI (myocardial infarction): circa 2014  Atrial fibrillation  TIA (transient ischemic attack)  DM type 2 (diabetes mellitus, type 2)  HLD (hyperlipidemia)  HTN (hypertension), benign  S/P carotid endarterectomy: left  Status post angioplasty with stent: LULU x 3 2/7/2014      FAMILY HISTORY:  Family history of heart disease      REVIEW OF SYSTEMS:  CONSTITUTIONAL: No fever, weight loss, or fatigue  EYES: No eye pain, visual disturbances, or discharge  NECK: No pain or stiffness  RESPIRATORY: No cough or wheezing, minimal shortness of breath  CARDIOVASCULAR: No chest pain, palpitations, dizziness, less leg swelling  GASTROINTESTINAL: No abdominal or epigastric pain. No nausea, vomiting, diarrhea or constipation  GENITOURINARY: No dysuria, urinary frequency or urgency, no hematuria  NEUROLOGICAL: No headaches, memory loss, loss of strength, numbness, or tremors  SKIN: No itching, burning, rashes, or lesions   MUSCULOSKELETAL: No joint pain or swelling; No muscle, back, or extremity pain      Medications:  MEDICATIONS  (STANDING):  aspirin enteric coated 81 milliGRAM(s) Oral daily  azithromycin  IVPB 500 milliGRAM(s) IV Intermittent every 24 hours  cefTRIAXone   IVPB 1 Gram(s) IV Intermittent every 24 hours  dabigatran 150 milliGRAM(s) Oral every 12 hours  dextrose 5%. 1000 milliLiter(s) (50 mL/Hr) IV Continuous <Continuous>  dextrose 50% Injectable 12.5 Gram(s) IV Push once  dextrose 50% Injectable 25 Gram(s) IV Push once  dextrose 50% Injectable 25 Gram(s) IV Push once  influenza   Vaccine 0.5 milliLiter(s) IntraMuscular once  insulin glargine Injectable (LANTUS) 21 Unit(s) SubCutaneous at bedtime  insulin lispro (HumaLOG) corrective regimen sliding scale   SubCutaneous at bedtime  insulin lispro (HumaLOG) corrective regimen sliding scale   SubCutaneous three times a day before meals  insulin lispro Injectable (HumaLOG) 7 Unit(s) SubCutaneous three times a day before meals  metolazone 5 milliGRAM(s) Oral <User Schedule>  metoprolol succinate  milliGRAM(s) Oral daily  pregabalin 100 milliGRAM(s) Oral two times a day    MEDICATIONS  (PRN):  dextrose 40% Gel 15 Gram(s) Oral once PRN Blood Glucose LESS THAN 70 milliGRAM(s)/deciliter  glucagon  Injectable 1 milliGRAM(s) IntraMuscular once PRN Glucose LESS THAN 70 milligrams/deciliter    	    PHYSICAL EXAM:  T(C): 36.4 (12-31-18 @ 12:05), Max: 36.6 (12-30-18 @ 20:32)  HR: 61 (12-31-18 @ 12:05) (61 - 72)  BP: 104/77 (12-31-18 @ 12:05) (104/77 - 154/83)  RR: 18 (12-31-18 @ 12:05) (17 - 18)  SpO2: 94% (12-31-18 @ 12:05) (94% - 96%)  Wt(kg): --  I&O's Summary    30 Dec 2018 07:01  -  31 Dec 2018 07:00  --------------------------------------------------------  IN: 460 mL / OUT: 2230 mL / NET: -1770 mL    31 Dec 2018 07:01  -  31 Dec 2018 14:16  --------------------------------------------------------  IN: 360 mL / OUT: 550 mL / NET: -190 mL      Appearance: Normal	  HEENT:   NCAT, PERRL, EOMI	  Lymphatic: No lymphadenopathy  Cardiovascular: Normal S1 S2, RRR  Respiratory: Lungs clear to auscultation BL  Psychiatry: A & O x 3, Mood & affect appropriate  Gastrointestinal:  Soft, Non-tender, + BS  Skin: No rashes, No ecchymoses, No cyanosis	  Neurologic: Non-focal  Extremities: Normal range of motion, No clubbing, cyanosis or edema    LABS:	 	    CARDIAC MARKERS:                                16.9   7.76  )-----------( 176      ( 31 Dec 2018 08:10 )             52.2     12-31    134<L>  |  91<L>  |  63<H>  ----------------------------<  202<H>  3.6   |  29  |  2.20<H>    Ca    9.1      31 Dec 2018 06:34  Phos  5.9     12-30  Mg     2.3     12-31      proBNP:   Lipid Profile:   HgA1c:   TSH:
CHIEF COMPLAINT:Patient is a 63y old  Male who presents with a chief complaint of sob/cough (29 Dec 2018 14:35)  feeling well    Allergies:  No Known Allergies      PAST MEDICAL & SURGICAL HISTORY:  CAD (coronary artery disease)  MI (myocardial infarction): circa 2014  Atrial fibrillation  TIA (transient ischemic attack)  DM type 2 (diabetes mellitus, type 2)  HLD (hyperlipidemia)  HTN (hypertension), benign  S/P carotid endarterectomy: left  Status post angioplasty with stent: LULU x 3 2/7/2014      FAMILY HISTORY:  Family history of heart disease      REVIEW OF SYSTEMS:  CONSTITUTIONAL: No fever, weight loss, or fatigue  EYES: No eye pain, visual disturbances, or discharge  NECK: No pain or stiffness  RESPIRATORY: No cough or wheezing, minimal shortness of breath  CARDIOVASCULAR: No chest pain, palpitations, dizziness, no leg swelling  GASTROINTESTINAL: No abdominal or epigastric pain. No nausea, vomiting, diarrhea or constipation  GENITOURINARY: No dysuria, urinary frequency or urgency, no hematuria  NEUROLOGICAL: No headaches, memory loss, loss of strength, numbness, or tremors  SKIN: No itching, burning, rashes, or lesions   MUSCULOSKELETAL: No joint pain or swelling; No muscle, back, or extremity pain      Medications:  MEDICATIONS  (STANDING):  aspirin enteric coated 81 milliGRAM(s) Oral daily  azithromycin  IVPB 500 milliGRAM(s) IV Intermittent every 24 hours  buMETAnide 2 milliGRAM(s) Oral daily  cefTRIAXone   IVPB 1 Gram(s) IV Intermittent every 24 hours  dabigatran 150 milliGRAM(s) Oral every 12 hours  dextrose 5%. 1000 milliLiter(s) (50 mL/Hr) IV Continuous <Continuous>  dextrose 50% Injectable 12.5 Gram(s) IV Push once  dextrose 50% Injectable 25 Gram(s) IV Push once  dextrose 50% Injectable 25 Gram(s) IV Push once  influenza   Vaccine 0.5 milliLiter(s) IntraMuscular once  insulin glargine Injectable (LANTUS) 21 Unit(s) SubCutaneous at bedtime  insulin lispro (HumaLOG) corrective regimen sliding scale   SubCutaneous at bedtime  insulin lispro (HumaLOG) corrective regimen sliding scale   SubCutaneous three times a day before meals  insulin lispro Injectable (HumaLOG) 7 Unit(s) SubCutaneous three times a day before meals  metolazone 5 milliGRAM(s) Oral <User Schedule>  metoprolol succinate  milliGRAM(s) Oral daily  potassium chloride    Tablet ER 40 milliEquivalent(s) Oral every 4 hours  pregabalin 100 milliGRAM(s) Oral two times a day    MEDICATIONS  (PRN):  dextrose 40% Gel 15 Gram(s) Oral once PRN Blood Glucose LESS THAN 70 milliGRAM(s)/deciliter  glucagon  Injectable 1 milliGRAM(s) IntraMuscular once PRN Glucose LESS THAN 70 milligrams/deciliter    	    PHYSICAL EXAM:  T(C): 36.7 (01-01-19 @ 04:33), Max: 36.7 (01-01-19 @ 04:33)  HR: 85 (01-01-19 @ 04:33) (61 - 89)  BP: 125/71 (01-01-19 @ 04:33) (104/77 - 133/82)  RR: 18 (01-01-19 @ 04:33) (18 - 18)  SpO2: 94% (01-01-19 @ 04:33) (94% - 94%)  Wt(kg): --  I&O's Summary    31 Dec 2018 07:01  -  01 Jan 2019 07:00  --------------------------------------------------------  IN: 780 mL / OUT: 1350 mL / NET: -570 mL    01 Jan 2019 07:01  -  01 Jan 2019 09:49  --------------------------------------------------------  IN: 240 mL / OUT: 0 mL / NET: 240 mL      Appearance: Normal	  HEENT:   NCAT, PERRL, EOMI	  Lymphatic: No lymphadenopathy  Cardiovascular: Normal S1 S2, RRR  Respiratory: Lungs clear to auscultation BL  Psychiatry: A & O x 3, Mood & affect appropriate  Gastrointestinal:  Soft, Non-tender, + BS  Skin: No rashes, No ecchymoses, No cyanosis	  Neurologic: Non-focal  Extremities: Normal range of motion, No clubbing, cyanosis or edema    LABS:	 	    CARDIAC MARKERS:                                16.8   10.16 )-----------( 196      ( 01 Jan 2019 09:18 )             53.5     01-01    133<L>  |  90<L>  |  65<H>  ----------------------------<  200<H>  3.2<L>   |  27  |  2.03<H>    Ca    9.0      01 Jan 2019 05:54  Mg     2.3     12-31      proBNP:   Lipid Profile:   HgA1c:   TSH:
Difficulty sleeping in bed  PRN productive cough    VS Stable Afeb    Lungs- decreased BS at bases. Minimal scattered rhonchi bilat  Heart- Irr R&R  Abd- non tender  Ext- chronic stasis changes    IMP: Acute on chronic diastolic CHF with respiratory infection  CKD    PLAN: Antibx as ordered for CAP  Optimize cardiac function  F/u labs    Jose Mclean MD [facility]  [phone]  (Vinay/Ramin/Cuong)
[facility]   [phone]  RENAL FOLLOW UP NOTE  --------------------------------------------------------------------------------  Chief Complaint:    24 hour events/subjective:    seen earlier this afternoon.   states breathing is better. has no edema     PAST HISTORY  --------------------------------------------------------------------------------  No significant changes to PMH, PSH, FHx, SHx, unless otherwise noted    ALLERGIES & MEDICATIONS  --------------------------------------------------------------------------------  Allergies    No Known Allergies    Intolerances      Standing Inpatient Medications  aspirin enteric coated 81 milliGRAM(s) Oral daily  azithromycin  IVPB 500 milliGRAM(s) IV Intermittent every 24 hours  cefTRIAXone   IVPB 1 Gram(s) IV Intermittent every 24 hours  dabigatran 150 milliGRAM(s) Oral every 12 hours  dextrose 5%. 1000 milliLiter(s) IV Continuous <Continuous>  dextrose 50% Injectable 12.5 Gram(s) IV Push once  dextrose 50% Injectable 25 Gram(s) IV Push once  dextrose 50% Injectable 25 Gram(s) IV Push once  influenza   Vaccine 0.5 milliLiter(s) IntraMuscular once  insulin glargine Injectable (LANTUS) 21 Unit(s) SubCutaneous at bedtime  insulin lispro (HumaLOG) corrective regimen sliding scale   SubCutaneous at bedtime  insulin lispro (HumaLOG) corrective regimen sliding scale   SubCutaneous three times a day before meals  insulin lispro Injectable (HumaLOG) 7 Unit(s) SubCutaneous three times a day before meals  metolazone 5 milliGRAM(s) Oral <User Schedule>  metoprolol succinate  milliGRAM(s) Oral daily  pregabalin 100 milliGRAM(s) Oral two times a day    PRN Inpatient Medications  dextrose 40% Gel 15 Gram(s) Oral once PRN  glucagon  Injectable 1 milliGRAM(s) IntraMuscular once PRN      REVIEW OF SYSTEMS  --------------------------------------------------------------------------------    Gen: denies  fevers/chills,  CVS: denies chest pain/palpitations  Resp: denies SOB/Cough  GI: Denies N/V/Abd pain  : Denies dysuria/oliguria/hematuria    All other systems were reviewed and are negative, except as noted.    VITALS/PHYSICAL EXAM  --------------------------------------------------------------------------------  T(C): 36.4 (12-31-18 @ 12:05), Max: 36.6 (12-30-18 @ 20:32)  HR: 61 (12-31-18 @ 12:05) (61 - 72)  BP: 104/77 (12-31-18 @ 12:05) (104/77 - 154/83)  RR: 18 (12-31-18 @ 12:05) (17 - 18)  SpO2: 94% (12-31-18 @ 12:05) (94% - 96%)  Wt(kg): --        12-30-18 @ 07:01  -  12-31-18 @ 07:00  --------------------------------------------------------  IN: 460 mL / OUT: 2230 mL / NET: -1770 mL    12-31-18 @ 07:01  -  12-31-18 @ 18:25  --------------------------------------------------------  IN: 360 mL / OUT: 550 mL / NET: -190 mL      Physical Exam:  	  Gen: comfortable appearing    	no   jvd   	Pulm: decrease bs  no rales or ronchi or wheezing  	CV: RRR, S1S2; no rub  	Back: No CVA tenderness  	Abd: +BS, soft, nontender/nondistended  	: No suprapubic tenderness  	UE: Warm, no cyanosis  no clubbing,  chronic stasis changes. with no edema   	LE: Warm, no cyanosis  no clubbing, no edema      LABS/STUDIES  --------------------------------------------------------------------------------              16.9   7.76  >-----------<  176      [12-31-18 @ 08:10]              52.2     134  |  91  |  63  ----------------------------<  202      [12-31-18 @ 06:34]  3.6   |  29  |  2.20        Ca     9.1     [12-31-18 @ 06:34]      Mg     2.3     [12-31-18 @ 06:34]      Phos  5.9     [12-30-18 @ 05:37]            Creatinine Trend:  SCr 2.20 [12-31 @ 06:34]  SCr 2.51 [12-30 @ 05:37]  SCr 2.40 [12-29 @ 05:59]  SCr 2.00 [12-28 @ 12:58]  SCr 2.13 [12-28 @ 11:15]                  HbA1c 11.0      [12-30-18 @ 08:26]  TSH 1.71      [12-28-18 @ 14:58]
Pulmonary Medicine       Feeling better. Able to lie in bed without any shortness of breath.       Vital Signs Last 24 Hrs  T(C): 36.4 (31 Dec 2018 04:25), Max: 36.6 (30 Dec 2018 20:32)  T(F): 97.5 (31 Dec 2018 04:25), Max: 97.8 (30 Dec 2018 20:32)  HR: 67 (31 Dec 2018 04:25) (67 - 87)  BP: 126/71 (31 Dec 2018 04:25) (126/71 - 154/83)  BP(mean): --  RR: 17 (31 Dec 2018 04:25) (17 - 18)  SpO2: 94% (31 Dec 2018 04:25) (94% - 96%)      Physical examination   Alert and oriented X three   No evident distress   Heart sounds were irregular   Lungs were clear  The abdomen was soft and not tender   No cyanosis                             16.9   7.76  )-----------( 176      ( 31 Dec 2018 08:10 )             52.2       12-31    134<L>  |  91<L>  |  63<H>  ----------------------------<  202<H>  3.6   |  29  |  2.20<H>    Ca    9.1      31 Dec 2018 06:34  Phos  5.9     12-30  Mg     2.3     12-31      MEDICATIONS  (STANDING):  aspirin enteric coated 81 milliGRAM(s) Oral daily  azithromycin  IVPB 500 milliGRAM(s) IV Intermittent every 24 hours  buMETAnide Injectable 2 milliGRAM(s) IV Push daily  cefTRIAXone   IVPB 1 Gram(s) IV Intermittent every 24 hours  dabigatran 150 milliGRAM(s) Oral every 12 hours  dextrose 5%. 1000 milliLiter(s) (50 mL/Hr) IV Continuous <Continuous>  dextrose 50% Injectable 12.5 Gram(s) IV Push once  dextrose 50% Injectable 25 Gram(s) IV Push once  dextrose 50% Injectable 25 Gram(s) IV Push once  influenza   Vaccine 0.5 milliLiter(s) IntraMuscular once  insulin glargine Injectable (LANTUS) 21 Unit(s) SubCutaneous at bedtime  insulin lispro (HumaLOG) corrective regimen sliding scale   SubCutaneous at bedtime  insulin lispro (HumaLOG) corrective regimen sliding scale   SubCutaneous three times a day before meals  insulin lispro Injectable (HumaLOG) 7 Unit(s) SubCutaneous three times a day before meals  metolazone 5 milliGRAM(s) Oral daily  metoprolol succinate  milliGRAM(s) Oral daily  pregabalin 100 milliGRAM(s) Oral two times a day

## 2019-01-01 NOTE — PROGRESS NOTE ADULT - PROVIDER SPECIALTY LIST ADULT
Cardiology
Internal Medicine
Nephrology
Pulmonology
Internal Medicine
Nephrology

## 2019-01-03 ENCOUNTER — INBOUND DOCUMENT (OUTPATIENT)
Age: 64
End: 2019-01-03

## 2019-01-14 ENCOUNTER — RESULT CHARGE (OUTPATIENT)
Age: 64
End: 2019-01-14

## 2019-01-14 ENCOUNTER — APPOINTMENT (OUTPATIENT)
Dept: ENDOCRINOLOGY | Facility: CLINIC | Age: 64
End: 2019-01-14
Payer: COMMERCIAL

## 2019-01-14 VITALS — BODY MASS INDEX: 39.68 KG/M2 | WEIGHT: 293 LBS | HEIGHT: 72 IN

## 2019-01-14 PROCEDURE — G0108 DIAB MANAGE TRN  PER INDIV: CPT

## 2019-01-15 LAB — GLUCOSE BLDC GLUCOMTR-MCNC: 156

## 2019-03-20 ENCOUNTER — APPOINTMENT (OUTPATIENT)
Dept: ENDOCRINOLOGY | Facility: CLINIC | Age: 64
End: 2019-03-20
Payer: COMMERCIAL

## 2019-03-20 VITALS
HEART RATE: 86 BPM | WEIGHT: 295 LBS | OXYGEN SATURATION: 98 % | SYSTOLIC BLOOD PRESSURE: 130 MMHG | DIASTOLIC BLOOD PRESSURE: 80 MMHG | BODY MASS INDEX: 40.01 KG/M2

## 2019-03-20 LAB — GLUCOSE BLDC GLUCOMTR-MCNC: 256

## 2019-03-20 PROCEDURE — 99245 OFF/OP CONSLTJ NEW/EST HI 55: CPT | Mod: 25

## 2019-03-20 PROCEDURE — 82962 GLUCOSE BLOOD TEST: CPT

## 2019-03-21 NOTE — HISTORY OF PRESENT ILLNESS
[FreeTextEntry1] : 63 year old male with PMHx AFIB, HTN, HLD, PVD, CVA, CAD, NSTEMI s/p PCI to mid LAD, prox LCx, distal LCx, Diastolic Heart failure, DM, severe left internal carotid disease here for evaluation of DM Type 2 \par \par \par Diabetes New Patient HPI\par \par CC\par Patient referred by Dr. Ceja for diabetes management.\par \par \par HPI:\par \par Duration of Diabetes: 20 years         \par Is patient on Insulin? Yes          \par If yes, how long on insulin?  Since 12.2018\par \par Previously was on metformin at that was taken off when he had some renal damage. \par After that he was taking glimeperide, discontinued in the hospital \par \par List Current Medications for Glycemic control and the doses:\par 1-   Lantus 20 units at bedtime         \par 2-   Humalog brekfast 8-(lunch 8-12 units)- ( dinner8-10 units)   \par 3-        \par \par SMBG (self monitored blood glucose) readings: \par - Name of glucometer:          \par - How often does the patient check BG?  3-4 times per day           \par - Does the patient keep a log?           \par \par If detailed record is available, what is the range of most of the BG readings?\par - Before Breakfast: 200-250\par - Before Lunch: 250s\par - Before Dinner: 200-300\par \par \par Does patient get Hypoglycemic episodes? Never had that                    \par \par Diabetic Complications: Is patient aware of having any of those complications?\par - Eyes: Retinopathy? None           \par        	When was the last fully dilated eye exam? None            \par - Feet: 	Neuropathy? Feet has neuropathy           \par         	Foot Ulcers?   None       \par 	When was the last time patient saw a Podiatrist?  <1 year ago        \par - Kidneys: Nephropathy? GFr of 34            \par   \par \par Diet: review patient's diet:     \par Breakfast: english muffin, corn muffin top, cereal, oatmeal, omelet ( feta cheese and spinach), bread\par Lunch: tuna salad sandwich, sardines, 2 slices of bread, fruit ( one plum or peach) \par Dinner: Fish, steak, pork chops, roast beef, salad, vegetables, escarole, what beans garlic \par Snacks: corn nuts, almonds \par Juice or soda: None \par Dessert: sugar free jello, occasional italian pastry \par  \par \par Exercise: review patient exercise habits:   Gardening              \par \par Symptoms: Write any symptoms, concerns and issues bothering the patient which have not be addressed above:     \par No excessive thirst or urination \par No blurry vision \par

## 2019-03-21 NOTE — REVIEW OF SYSTEMS
[Fatigue] : no fatigue [Decreased Appetite] : appetite not decreased [Recent Weight Gain (___ Lbs)] : no recent weight gain [Recent Weight Loss (___ Lbs)] : no recent weight loss [Visual Field Defect] : no visual field defect [Blurry Vision] : no blurred vision [Dry Eyes] : no dryness of the eyes [Eyes Itch] : no itching of the eyes [Dysphagia] : no dysphagia [Dysphonia] : no dysphonia [Neck Pain] : no neck pain [Nasal Congestion] : no nasal congestion [Chest Pain] : no chest pain [Palpitations] : no palpitations [Heart Rate Is Slow] : the heart rate was not slow [Heart Rate Is Fast] : the heart rate was not fast [Shortness Of Breath] : no shortness of breath [Wheezing] : no wheezing was heard [Cough] : no cough [SOB on Exertion] : no shortness of breath during exertion [Nausea] : no nausea [Vomiting] : no vomiting was observed [Constipation] : no constipation [Diarrhea] : no diarrhea [Polyuria] : no polyuria [Hesitancy] : no hesitancy [Joint Pain] : no joint pain [Joint Stiffness] : no joint stiffness [Muscle Weakness] : no muscle weakness [Muscle Cramps] : no muscle cramps [Acanthosis] : no acanthosis  [Hirsutism] : no hirsutism [Acne] : no acne [Hair Loss] : no hair loss [Headache] : no headaches [Tremors] : no tremors [Depression] : no depression [Anxiety] : no anxiety [Polydipsia] : no polydipsia [Galactorrhea] : no galactorrhea  [Cold Intolerance] : cold tolerant [Heat Intolerance] : heat tolerant [Easy Bleeding] : no ~M tendency for easy bleeding [Easy Bruising] : no tendency for easy bruising [Swelling] : no swelling

## 2019-03-21 NOTE — ASSESSMENT
[FreeTextEntry1] : 64 year old male here for evaluation of DM Type 2, uncontrolled. HgA1C of 12.7%\par \par -At this time advised to cut back on carb intake to 30-40 gm per meal, appears to be quite resistant to insulin\par -Increase Lantus to 24 units at bedtime, and advised to increased by 2 units every 2-3 days until am BG of < 150 \par -Will place paul on the patient \par -Humalog will be increased to  8-10-12, sliding scale # 2\par -BG goals are provided for pre-meal and post-meal BG levels  \par -Follow up on results \par -RTC in 3-4 months

## 2019-03-21 NOTE — PHYSICAL EXAM
[Alert] : alert [No Acute Distress] : no acute distress [Well Nourished] : well nourished [Well Developed] : well developed [Normal Sclera/Conjunctiva] : normal sclera/conjunctiva [PERRL] : pupils equal, round and reactive to light [EOMI] : extra ocular movement intact [No Proptosis] : no proptosis [Normal Outer Ear/Nose] : the ears and nose were normal in appearance [Normal TMs] : both tympanic membranes were normal [Normal Hearing] : hearing was normal [No Neck Mass] : no neck mass was observed [Supple] : the neck was supple [Thyroid Not Enlarged] : the thyroid was not enlarged [No Respiratory Distress] : no respiratory distress [Normal Rate and Effort] : normal respiratory rhythm and effort [No Accessory Muscle Use] : no accessory muscle use [Clear to Auscultation] : lungs were clear to auscultation bilaterally [Normal Rate] : heart rate was normal  [Normal S1, S2] : normal S1 and S2 [Regular Rhythm] : with a regular rhythm [Normal Bowel Sounds] : normal bowel sounds [Not Tender] : non-tender [Soft] : abdomen soft [Not Distended] : not distended [Normal] : normal and non tender [No CVA Tenderness] : no ~M costovertebral angle tenderness [No Spinal Tenderness] : no spinal tenderness [Normal Gait] : normal gait [No Joint Swelling] : no joint swelling seen [No Clubbing, Cyanosis] : no clubbing  or cyanosis of the fingernails [Normal Strength/Tone] : muscle strength and tone were normal [No Rash] : no rash [No Skin Lesions] : no skin lesions [Normal Reflexes] : deep tendon reflexes were 2+ and symmetric [No Motor Deficits] : the motor exam was normal [No Tremors] : no tremors [Oriented x3] : oriented to person, place, and time [Normal Insight/Judgement] : insight and judgment were intact [Normal Affect] : the affect was normal [Normal Mood] : the mood was normal [Kyphosis] : no kyphosis present [Scoliosis] : scoliosis not present [Foot Ulcers] : no foot ulcers [Acne] : no acne

## 2019-03-22 NOTE — PROGRESS NOTE ADULT - SUBJECTIVE AND OBJECTIVE BOX
INFECTIOUS DISEASES FOLLOW UP--Colby Salomon MD  Pager [phone]    This is a follow up note for this  63y Male with  gout vs. R ankle cellulitis  improved.  feels great.  refused steroids this am  tolerating ancef.    Further ROS:  CONSTITUTIONAL:  No fever, good appetite  CARDIOVASCULAR:  No chest pain or palpitations  RESPIRATORY:  No dyspnea  GASTROINTESTINAL:  No nausea, vomiting, diarrhea, or abdominal pain  GENITOURINARY:  No dysuria  NEUROLOGIC:  No headache,     Allergies  No Known Allergies    ANTIBIOTICS/RELEVANT:  antimicrobials  ceFAZolin   IVPB 1000 milliGRAM(s) IV Intermittent every 8 hours    OTHER:  acetaminophen   Tablet 650 milliGRAM(s) Oral every 6 hours PRN  aspirin enteric coated 81 milliGRAM(s) Oral daily  dabigatran 150 milliGRAM(s) Oral every 12 hours  dextrose 5%. 1000 milliLiter(s) IV Continuous <Continuous>  dextrose 50% Injectable 12.5 Gram(s) IV Push once  dextrose 50% Injectable 25 Gram(s) IV Push once  dextrose 50% Injectable 25 Gram(s) IV Push once  dextrose Gel 1 Dose(s) Oral once PRN  furosemide    Tablet 80 milliGRAM(s) Oral daily  glucagon  Injectable 1 milliGRAM(s) IntraMuscular once PRN  insulin lispro (HumaLOG) corrective regimen sliding scale   SubCutaneous three times a day before meals  insulin lispro (HumaLOG) corrective regimen sliding scale   SubCutaneous at bedtime  metoprolol succinate  milliGRAM(s) Oral daily  potassium chloride    Tablet ER 20 milliEquivalent(s) Oral once  pregabalin 100 milliGRAM(s) Oral three times a day    Objective:  Vital Signs Last 24 Hrs  T(C): 36.4 (03 May 2018 04:54), Max: 37.1 (02 May 2018 20:40)  T(F): 97.6 (03 May 2018 04:54), Max: 98.7 (02 May 2018 20:40)  HR: 82 (03 May 2018 04:54) (56 - 87)  BP: 120/89 (03 May 2018 04:54) (120/89 - 150/78)  BP(mean): --  RR: 17 (03 May 2018 04:54) (17 - 18)  SpO2: 97% (03 May 2018 04:54) (96% - 97%)    PHYSICAL EXAM:  Constitutional:no acute distress  Eyes:VINNIE, EOMI  Ear/Nose/Throat: no oral lesions, 	  Respiratory: clear BL  Cardiovascular: S1S2  Gastrointestinal:soft, (+) BS, no tenderness  Extremities:no e/e/c  No Lymphadenopathy  IV sites not inflammed.  excellent rom r ankle...faint erythema    LABS:                        14.7   10.81 )-----------( 177      ( 02 May 2018 07:38 )             47.6     05-03    136  |  96  |  52<H>  ----------------------------<  175<H>  3.4<L>   |  28  |  1.73<H>    Ca    9.0      03 May 2018 06:29  Phos  3.8     05-02  Mg     2.2     05-02    TPro  7.3  /  Alb  3.4  /  TBili  0.5  /  DBili  x   /  AST  15  /  ALT  7<L>  /  AlkPhos  71  05-02    PT/INR - ( 01 May 2018 18:10 )   PT: 15.8 sec;   INR: 1.45 ratio         PTT - ( 01 May 2018 18:10 )  PTT:53.8 sec 14

## 2019-05-15 ENCOUNTER — APPOINTMENT (OUTPATIENT)
Dept: ENDOCRINOLOGY | Facility: CLINIC | Age: 64
End: 2019-05-15
Payer: COMMERCIAL

## 2019-05-15 VITALS
BODY MASS INDEX: 39.68 KG/M2 | OXYGEN SATURATION: 96 % | HEART RATE: 59 BPM | SYSTOLIC BLOOD PRESSURE: 128 MMHG | DIASTOLIC BLOOD PRESSURE: 80 MMHG | WEIGHT: 293 LBS | HEIGHT: 72 IN

## 2019-05-15 LAB
GLUCOSE BLDC GLUCOMTR-MCNC: 78
HBA1C MFR BLD HPLC: 11.1

## 2019-05-15 PROCEDURE — 83036 HEMOGLOBIN GLYCOSYLATED A1C: CPT | Mod: QW

## 2019-05-15 PROCEDURE — 82962 GLUCOSE BLOOD TEST: CPT

## 2019-05-15 PROCEDURE — 99214 OFFICE O/P EST MOD 30 MIN: CPT | Mod: 25

## 2019-05-15 RX ORDER — INSULIN GLARGINE 100 [IU]/ML
100 INJECTION, SOLUTION SUBCUTANEOUS
Qty: 2 | Refills: 5 | Status: ACTIVE | COMMUNITY
Start: 2019-05-15 | End: 1900-01-01

## 2019-05-15 NOTE — PHYSICAL EXAM
[Alert] : alert [No Acute Distress] : no acute distress [Well Nourished] : well nourished [Well Developed] : well developed [Normal Sclera/Conjunctiva] : normal sclera/conjunctiva [PERRL] : pupils equal, round and reactive to light [EOMI] : extra ocular movement intact [Normal Outer Ear/Nose] : the ears and nose were normal in appearance [No Proptosis] : no proptosis [Normal TMs] : both tympanic membranes were normal [No Neck Mass] : no neck mass was observed [Supple] : the neck was supple [Thyroid Not Enlarged] : the thyroid was not enlarged [No Respiratory Distress] : no respiratory distress [Clear to Auscultation] : lungs were clear to auscultation bilaterally [No Accessory Muscle Use] : no accessory muscle use [Normal Rate] : heart rate was normal  [Normal S1, S2] : normal S1 and S2 [Regular Rhythm] : with a regular rhythm [Normal Bowel Sounds] : normal bowel sounds [Soft] : abdomen soft [Not Tender] : non-tender [Not Distended] : not distended [Normal Gait] : normal gait [No Clubbing, Cyanosis] : no clubbing  or cyanosis of the fingernails [No Joint Swelling] : no joint swelling seen [Normal Strength/Tone] : muscle strength and tone were normal [No Rash] : no rash [Foot Ulcers] : no foot ulcers [No Skin Lesions] : no skin lesions [Acne] : no acne [No Motor Deficits] : the motor exam was normal [Normal Reflexes] : deep tendon reflexes were 2+ and symmetric [No Tremors] : no tremors [Oriented x3] : oriented to person, place, and time [Normal Insight/Judgement] : insight and judgment were intact [Normal Affect] : the affect was normal [Normal Mood] : the mood was normal

## 2019-05-15 NOTE — ASSESSMENT
[FreeTextEntry1] : \par 64 year old male here for follow-up of DM Type 2, uncontrolled. HgA1C of 11.1%. \par \par -Restart Lantus 24 units ( samples provided)\par -Increase Lantus to 24 units at bedtime, and advised to increased by 2 units every 2-3 days until am BG of < 150 \par -Will place paul on the patient \par -Humalog will be increased to 8-10-12, sliding scale # 2\par -BG goals are provided for pre-meal and post-meal BG levels \par \par \par HLD\par -Will check lipid panel next visit\par -Continue Simvastatin  40 mg daily \par \par -RTC in 3 months.

## 2019-05-15 NOTE — HISTORY OF PRESENT ILLNESS
[FreeTextEntry1] : 63 year old male with PMHx AFIB, HTN, HLD, PVD, CVA, CAD, NSTEMI s/p PCI to mid LAD, prox LCx, distal LCx, Diastolic Heart failure, DM, severe left internal carotid disease here for evaluation of DM Type 2 \par \par Reported that he was having blurry vision and tremors today ( BG in the office was 78) \par HgA1c: 11.1% \par \par Diabetes Follow up Patient HPI\par \par CC\par Patient referred by Dr. Ceja for diabetes management.\par \par \par HPI:\par \par Duration of Diabetes: 20 years \par Is patient on Insulin? Yes \par If yes, how long on insulin? Since 12.2018\par \par Previously was on metformin at that was taken off when he had some renal damage. \par After that he was taking glimeperide, discontinued in the hospital \par \par List Current Medications for Glycemic control and the doses:\par 1- Lantus 20 units at bedtime ( 3 weeks) \par 2- Humalog brekfast 8-(lunch 8-12 units)- ( dinner8-10 units) \par 3- \par \par SMBG (self monitored blood glucose) readings: \par - Name of glucometer: \par - How often does the patient check BG? 3-4 times per day \par - Does the patient keep a log? \par \par If detailed record is available, what is the range of most of the BG readings?\par - Before Breakfast: 150-230\par - Before Lunch: 200-340\par - Before Dinner: 150-220\par Bedtime: 300s\par \par \par Does patient get Hypoglycemic episodes? Never had that  \par \par Diabetic Complications: Is patient aware of having any of those complications?\par - Eyes: Retinopathy? None \par  	When was the last fully dilated eye exam? None , pending appointment \par - Feet: 	Neuropathy? Feet has neuropathy \par  	Foot Ulcers? None \par 	When was the last time patient saw a Podiatrist? <1 year ago \par - Kidneys: Nephropathy? GFr of 34 \par  \par \par Diet: review patient's diet: \par Breakfast: english muffin, corn muffin top, cereal, oatmeal, omelet ( feta cheese and spinach), bread\par Lunch: tuna salad sandwich, sardines, 2 slices of bread, fruit ( one plum or peach) \par Dinner: Fish, steak, pork chops, roast beef, salad, vegetables, escarole, what beans garlic \par Snacks: corn nuts, almonds \par Juice or soda: None \par Dessert: sugar free jello, occasional italian pastry \par  \par \par Exercise: review patient exercise habits: Gardening \par \par Symptoms: Write any symptoms, concerns and issues bothering the patient which have not be addressed above: \par No excessive thirst or urination \par No blurry vision \par

## 2019-05-15 NOTE — REVIEW OF SYSTEMS
[Fatigue] : no fatigue [Decreased Appetite] : appetite not decreased [Recent Weight Gain (___ Lbs)] : no recent weight gain [Recent Weight Loss (___ Lbs)] : no recent weight loss [Visual Field Defect] : no visual field defect [Blurry Vision] : no blurred vision [Dry Eyes] : no dryness of the eyes [Eyes Itch] : no itching of the eyes [Dysphagia] : no dysphagia [Dysphonia] : no dysphonia [Neck Pain] : no neck pain [Nasal Congestion] : no nasal congestion [Chest Pain] : no chest pain [Palpitations] : no palpitations [Wheezing] : no wheezing was heard [Shortness Of Breath] : no shortness of breath [Cough] : no cough [SOB on Exertion] : no shortness of breath during exertion [Vomiting] : no vomiting was observed [Nausea] : no nausea [Constipation] : no constipation [Diarrhea] : no diarrhea [Polyuria] : no polyuria [Hesitancy] : no hesitancy [Joint Pain] : no joint pain [Joint Stiffness] : no joint stiffness [Acanthosis] : no acanthosis  [Acne] : no acne [Hirsutism] : no hirsutism [Headache] : no headaches [Hair Loss] : no hair loss [Tremors] : no tremors [Dizziness] : no dizziness [Pain/Numbness of Digits] : no pain/numbness of digits [Depression] : no depression [Polydipsia] : no polydipsia [Anxiety] : no anxiety [Cold Intolerance] : cold tolerant [Heat Intolerance] : heat tolerant [Galactorrhea] : no galactorrhea  [Easy Bruising] : no tendency for easy bruising [Easy Bleeding] : no ~M tendency for easy bleeding [Swelling] : no swelling

## 2019-05-16 ENCOUNTER — OTHER (OUTPATIENT)
Age: 64
End: 2019-05-16

## 2019-05-16 RX ORDER — INSULIN GLARGINE 100 [IU]/ML
100 INJECTION, SOLUTION SUBCUTANEOUS
Qty: 2 | Refills: 0 | Status: ACTIVE | COMMUNITY
Start: 2019-05-16 | End: 1900-01-01

## 2019-08-07 ENCOUNTER — APPOINTMENT (OUTPATIENT)
Dept: ENDOCRINOLOGY | Facility: CLINIC | Age: 64
End: 2019-08-07
Payer: COMMERCIAL

## 2019-08-07 VITALS
DIASTOLIC BLOOD PRESSURE: 78 MMHG | BODY MASS INDEX: 40.15 KG/M2 | OXYGEN SATURATION: 97 % | HEART RATE: 70 BPM | SYSTOLIC BLOOD PRESSURE: 148 MMHG | WEIGHT: 296 LBS

## 2019-08-07 DIAGNOSIS — E78.5 HYPERLIPIDEMIA, UNSPECIFIED: ICD-10-CM

## 2019-08-07 DIAGNOSIS — E11.9 TYPE 2 DIABETES MELLITUS W/OUT COMPLICATIONS: ICD-10-CM

## 2019-08-07 LAB
GLUCOSE BLDC GLUCOMTR-MCNC: 380
HBA1C MFR BLD HPLC: 9.9

## 2019-08-07 PROCEDURE — 82962 GLUCOSE BLOOD TEST: CPT

## 2019-08-07 PROCEDURE — 83036 HEMOGLOBIN GLYCOSYLATED A1C: CPT | Mod: QW

## 2019-08-07 PROCEDURE — 99214 OFFICE O/P EST MOD 30 MIN: CPT | Mod: 25

## 2019-08-07 RX ORDER — BUMETANIDE 0.5 MG/1
0.5 TABLET ORAL
Refills: 0 | Status: ACTIVE | COMMUNITY

## 2019-08-07 NOTE — ASSESSMENT
[FreeTextEntry1] : 64 year old male here for follow-up of DM Type 2, uncontrolled. HgA1C of 9.9%. \par Patient reported compliance with insulin but has had minimal improvement. \par At this time will be placing the mateo on him since the BG levels that are checked are not in correlation with the HgA1C ( Mateo: Code F95, SN: [id number], 12/31/2019) \par \par -Increase Lantus to 24 units at bedtime, and advised to increased by 2 units every 2-3 days until am BG of < 150 \par -Humalog will be increased to 8-10-12, sliding scale # 2\par -BG goals are provided for pre-meal and post-meal BG levels \par -Check BMP, lipid panel, microalb/cr ratio \par \par HLD\par -Will check lipid panel next visit\par -Continue Simvastatin 40 mg daily \par \par -RTC in 3 months. \par \par  \par

## 2019-08-07 NOTE — REVIEW OF SYSTEMS
[Decreased Appetite] : appetite not decreased [Fatigue] : no fatigue [Recent Weight Gain (___ Lbs)] : no recent weight gain [Recent Weight Loss (___ Lbs)] : no recent weight loss [Visual Field Defect] : no visual field defect [Blurry Vision] : no blurred vision [Dry Eyes] : no dryness of the eyes [Eyes Itch] : no itching of the eyes [Dysphagia] : no dysphagia [Neck Pain] : no neck pain [Dysphonia] : no dysphonia [Nasal Congestion] : no nasal congestion [Palpitations] : no palpitations [Chest Pain] : no chest pain [Heart Rate Is Slow] : the heart rate was not slow [Heart Rate Is Fast] : the heart rate was not fast [Shortness Of Breath] : no shortness of breath [Wheezing] : no wheezing was heard [Cough] : no cough [SOB on Exertion] : no shortness of breath during exertion [Vomiting] : no vomiting was observed [Nausea] : no nausea [Constipation] : no constipation [Diarrhea] : no diarrhea [Polyuria] : no polyuria [Hesitancy] : no hesitancy [Joint Pain] : no joint pain [Muscle Weakness] : no muscle weakness [Joint Stiffness] : no joint stiffness [Acanthosis] : no acanthosis  [Hirsutism] : no hirsutism [Headache] : no headaches [Tremors] : no tremors [Depression] : no depression [Dizziness] : no dizziness [Polydipsia] : no polydipsia [Anxiety] : no anxiety [Cold Intolerance] : cold tolerant [Galactorrhea] : no galactorrhea  [Heat Intolerance] : heat tolerant [Easy Bleeding] : no ~M tendency for easy bleeding [Easy Bruising] : no tendency for easy bruising [Swelling] : no swelling

## 2019-08-07 NOTE — HISTORY OF PRESENT ILLNESS
[FreeTextEntry1] : 63 year old male with PMHx AFIB, HTN, HLD, PVD, CVA, CAD, NSTEMI s/p PCI to mid LAD, prox LCx, distal LCx, Diastolic Heart failure, DM, severe left internal carotid disease here for evaluation of DM Type 2 \par \par Reported that he was having blurry vision and tremors today ( BG in the office was 78) \par HgA1c: 11.1% \par \par Diabetes Follow up Patient HPI\par \par CC\par Patient referred by Dr. Ceja for diabetes management.\par \par \par HPI:\par \par Duration of Diabetes: 20 years \par Is patient on Insulin? Yes \par If yes, how long on insulin? Since 12.2018\par \par Previously was on metformin at that was taken off when he had some renal damage. \par After that he was taking glimeperide, discontinued in the hospital \par \par List Current Medications for Glycemic control and the doses:\par 1- Lantus 20-24 units at bedtime ( 3 weeks) \par 2- Humalog brekfast 8-(lunch 8-10 units)- ( dinner8-12 units) \par 3- \par \par SMBG (self monitored blood glucose) readings: \par - Name of glucometer: \par - How often does the patient check BG? 3-4 times per day \par - Does the patient keep a log? \par \par If detailed record is available, what is the range of most of the BG readings?\par - Before Breakfast:150-200\par - Before Lunch: 150-200\par - Before Dinner: 150-220\par \par \par \par Does patient get Hypoglycemic episodes? Never had that \par \par Diabetic Complications: Is patient aware of having any of those complications?\par - Eyes: Retinopathy? None \par  	When was the last fully dilated eye exam? None , pending appointment \par - Feet: 	Neuropathy? Feet has neuropathy \par  	Foot Ulcers? None \par 	When was the last time patient saw a Podiatrist? Pending appointment in 2-3 weeks \par - Kidneys: Nephropathy? GFr of 34 \par  \par \par Diet: review patient's diet: \par Breakfast: english muffin, corn muffin top, cereal, oatmeal, omelet ( feta cheese and spinach), bread\par Lunch: tuna salad sandwich, sardines, 2 slices of bread, fruit ( one plum or peach) \par Dinner: Fish, steak, pork chops, roast beef, salad, vegetables, escarole, what beans garlic \par Snacks: corn nuts, almonds \par Juice or soda: None \par Dessert: sugar free jello, occasional italian pastry \par  ( Bread and pasta) \par \par Exercise: review patient exercise habits: Gardening \par \par Symptoms: Write any symptoms, concerns and issues bothering the patient which have not be addressed above: \par No excessive thirst or urination \par No blurry vision \par

## 2019-08-07 NOTE — PHYSICAL EXAM
[Alert] : alert [No Acute Distress] : no acute distress [Well Developed] : well developed [Well Nourished] : well nourished [Normal Sclera/Conjunctiva] : normal sclera/conjunctiva [PERRL] : pupils equal, round and reactive to light [EOMI] : extra ocular movement intact [No Proptosis] : no proptosis [Normal Outer Ear/Nose] : the ears and nose were normal in appearance [Normal Hearing] : hearing was normal [Normal TMs] : both tympanic membranes were normal [Supple] : the neck was supple [No Neck Mass] : no neck mass was observed [Thyroid Not Enlarged] : the thyroid was not enlarged [No Respiratory Distress] : no respiratory distress [Normal Rate and Effort] : normal respiratory rhythm and effort [Clear to Auscultation] : lungs were clear to auscultation bilaterally [Normal Rate] : heart rate was normal  [Normal S1, S2] : normal S1 and S2 [Regular Rhythm] : with a regular rhythm [Soft] : abdomen soft [Not Tender] : non-tender [Normal Bowel Sounds] : normal bowel sounds [No CVA Tenderness] : no ~M costovertebral angle tenderness [Not Distended] : not distended [Normal Gait] : normal gait [No Joint Swelling] : no joint swelling seen [No Clubbing, Cyanosis] : no clubbing  or cyanosis of the fingernails [Normal Strength/Tone] : muscle strength and tone were normal [No Skin Lesions] : no skin lesions [No Rash] : no rash [Normal Reflexes] : deep tendon reflexes were 2+ and symmetric [No Motor Deficits] : the motor exam was normal [No Tremors] : no tremors [Oriented x3] : oriented to person, place, and time [Normal Affect] : the affect was normal [Normal Insight/Judgement] : insight and judgment were intact [Normal Mood] : the mood was normal

## 2019-09-13 LAB
ANION GAP SERPL CALC-SCNC: 16 MMOL/L
BUN SERPL-MCNC: 50 MG/DL
CALCIUM SERPL-MCNC: 9 MG/DL
CHLORIDE SERPL-SCNC: 94 MMOL/L
CHOLEST SERPL-MCNC: 205 MG/DL
CHOLEST/HDLC SERPL: 5.7 RATIO
CO2 SERPL-SCNC: 26 MMOL/L
CREAT SERPL-MCNC: 1.72 MG/DL
CREAT SPEC-SCNC: 62 MG/DL
ESTIMATED AVERAGE GLUCOSE: 249 MG/DL
GLUCOSE SERPL-MCNC: 371 MG/DL
HBA1C MFR BLD HPLC: 10.3 %
HDLC SERPL-MCNC: 36 MG/DL
LDLC SERPL CALC-MCNC: 133 MG/DL
MICROALBUMIN 24H UR DL<=1MG/L-MCNC: 120 MG/DL
MICROALBUMIN/CREAT 24H UR-RTO: 1927 MG/G
POTASSIUM SERPL-SCNC: 4.5 MMOL/L
SODIUM SERPL-SCNC: 136 MMOL/L
TRIGL SERPL-MCNC: 182 MG/DL

## 2019-10-16 ENCOUNTER — INPATIENT (INPATIENT)
Facility: HOSPITAL | Age: 64
LOS: 2 days | Discharge: HOME | DRG: 186 | End: 2019-10-19
Attending: INTERNAL MEDICINE | Admitting: INTERNAL MEDICINE
Payer: COMMERCIAL

## 2019-10-16 VITALS
OXYGEN SATURATION: 95 % | WEIGHT: 296.96 LBS | HEART RATE: 79 BPM | TEMPERATURE: 98 F | RESPIRATION RATE: 22 BRPM | HEIGHT: 71.5 IN | SYSTOLIC BLOOD PRESSURE: 154 MMHG | DIASTOLIC BLOOD PRESSURE: 88 MMHG

## 2019-10-16 DIAGNOSIS — Z98.89 OTHER SPECIFIED POSTPROCEDURAL STATES: Chronic | ICD-10-CM

## 2019-10-16 DIAGNOSIS — R07.9 CHEST PAIN, UNSPECIFIED: ICD-10-CM

## 2019-10-16 DIAGNOSIS — J90 PLEURAL EFFUSION, NOT ELSEWHERE CLASSIFIED: ICD-10-CM

## 2019-10-16 DIAGNOSIS — Z95.5 PRESENCE OF CORONARY ANGIOPLASTY IMPLANT AND GRAFT: Chronic | ICD-10-CM

## 2019-10-16 DIAGNOSIS — I50.9 HEART FAILURE, UNSPECIFIED: ICD-10-CM

## 2019-10-16 LAB
ALBUMIN SERPL ELPH-MCNC: 3.3 G/DL — SIGNIFICANT CHANGE UP (ref 3.3–5)
ALP SERPL-CCNC: 113 U/L — SIGNIFICANT CHANGE UP (ref 40–120)
ALT FLD-CCNC: 6 U/L — LOW (ref 10–45)
ANION GAP SERPL CALC-SCNC: 12 MMOL/L — SIGNIFICANT CHANGE UP (ref 5–17)
ANION GAP SERPL CALC-SCNC: 15 MMOL/L — SIGNIFICANT CHANGE UP (ref 5–17)
APTT BLD: 41.5 SEC — HIGH (ref 27.5–36.3)
AST SERPL-CCNC: 16 U/L — SIGNIFICANT CHANGE UP (ref 10–40)
BILIRUB SERPL-MCNC: 0.5 MG/DL — SIGNIFICANT CHANGE UP (ref 0.2–1.2)
BUN SERPL-MCNC: 43 MG/DL — HIGH (ref 7–23)
BUN SERPL-MCNC: 48 MG/DL — HIGH (ref 7–23)
CALCIUM SERPL-MCNC: 8.3 MG/DL — LOW (ref 8.4–10.5)
CALCIUM SERPL-MCNC: 9 MG/DL — SIGNIFICANT CHANGE UP (ref 8.4–10.5)
CHLORIDE SERPL-SCNC: 94 MMOL/L — LOW (ref 96–108)
CHLORIDE SERPL-SCNC: 97 MMOL/L — SIGNIFICANT CHANGE UP (ref 96–108)
CK MB CFR SERPL CALC: 4 NG/ML — SIGNIFICANT CHANGE UP (ref 0–6.7)
CK SERPL-CCNC: 80 U/L — SIGNIFICANT CHANGE UP (ref 30–200)
CO2 SERPL-SCNC: 27 MMOL/L — SIGNIFICANT CHANGE UP (ref 22–31)
CO2 SERPL-SCNC: 29 MMOL/L — SIGNIFICANT CHANGE UP (ref 22–31)
CREAT SERPL-MCNC: 1.68 MG/DL — HIGH (ref 0.5–1.3)
CREAT SERPL-MCNC: 1.86 MG/DL — HIGH (ref 0.5–1.3)
GLUCOSE BLDC GLUCOMTR-MCNC: 189 MG/DL — HIGH (ref 70–99)
GLUCOSE BLDC GLUCOMTR-MCNC: 338 MG/DL — HIGH (ref 70–99)
GLUCOSE SERPL-MCNC: 213 MG/DL — HIGH (ref 70–99)
GLUCOSE SERPL-MCNC: 255 MG/DL — HIGH (ref 70–99)
HCT VFR BLD CALC: 53.2 % — HIGH (ref 39–50)
HGB BLD-MCNC: 16.5 G/DL — SIGNIFICANT CHANGE UP (ref 13–17)
INR BLD: 1.19 RATIO — HIGH (ref 0.88–1.16)
MCHC RBC-ENTMCNC: 25.5 PG — LOW (ref 27–34)
MCHC RBC-ENTMCNC: 31 GM/DL — LOW (ref 32–36)
MCV RBC AUTO: 82.4 FL — SIGNIFICANT CHANGE UP (ref 80–100)
NRBC # BLD: 0 /100 WBCS — SIGNIFICANT CHANGE UP (ref 0–0)
NT-PROBNP SERPL-SCNC: 1683 PG/ML — HIGH (ref 0–300)
PLATELET # BLD AUTO: 195 K/UL — SIGNIFICANT CHANGE UP (ref 150–400)
POTASSIUM SERPL-MCNC: 3.9 MMOL/L — SIGNIFICANT CHANGE UP (ref 3.5–5.3)
POTASSIUM SERPL-MCNC: 4.8 MMOL/L — SIGNIFICANT CHANGE UP (ref 3.5–5.3)
POTASSIUM SERPL-SCNC: 3.9 MMOL/L — SIGNIFICANT CHANGE UP (ref 3.5–5.3)
POTASSIUM SERPL-SCNC: 4.8 MMOL/L — SIGNIFICANT CHANGE UP (ref 3.5–5.3)
PROT SERPL-MCNC: 7.4 G/DL — SIGNIFICANT CHANGE UP (ref 6–8.3)
PROTHROM AB SERPL-ACNC: 13.7 SEC — HIGH (ref 10–12.9)
RBC # BLD: 6.46 M/UL — HIGH (ref 4.2–5.8)
RBC # FLD: 16.5 % — HIGH (ref 10.3–14.5)
SODIUM SERPL-SCNC: 136 MMOL/L — SIGNIFICANT CHANGE UP (ref 135–145)
SODIUM SERPL-SCNC: 138 MMOL/L — SIGNIFICANT CHANGE UP (ref 135–145)
TROPONIN T, HIGH SENSITIVITY RESULT: 68 NG/L — HIGH (ref 0–51)
TROPONIN T, HIGH SENSITIVITY RESULT: 76 NG/L — HIGH (ref 0–51)
WBC # BLD: 11.63 K/UL — HIGH (ref 3.8–10.5)
WBC # FLD AUTO: 11.63 K/UL — HIGH (ref 3.8–10.5)

## 2019-10-16 PROCEDURE — 71046 X-RAY EXAM CHEST 2 VIEWS: CPT | Mod: 26

## 2019-10-16 PROCEDURE — 71250 CT THORAX DX C-: CPT | Mod: 26

## 2019-10-16 PROCEDURE — 99285 EMERGENCY DEPT VISIT HI MDM: CPT

## 2019-10-16 RX ORDER — DABIGATRAN ETEXILATE MESYLATE 150 MG/1
150 CAPSULE ORAL
Qty: 0 | Refills: 0 | DISCHARGE

## 2019-10-16 RX ORDER — ASPIRIN/CALCIUM CARB/MAGNESIUM 324 MG
81 TABLET ORAL ONCE
Refills: 0 | Status: COMPLETED | OUTPATIENT
Start: 2019-10-16 | End: 2019-10-16

## 2019-10-16 RX ORDER — DABIGATRAN ETEXILATE MESYLATE 150 MG/1
150 CAPSULE ORAL
Refills: 0 | Status: DISCONTINUED | OUTPATIENT
Start: 2019-10-16 | End: 2019-10-19

## 2019-10-16 RX ORDER — BUMETANIDE 0.25 MG/ML
2 INJECTION INTRAMUSCULAR; INTRAVENOUS DAILY
Refills: 0 | Status: DISCONTINUED | OUTPATIENT
Start: 2019-10-16 | End: 2019-10-16

## 2019-10-16 RX ORDER — DEXTROSE 50 % IN WATER 50 %
25 SYRINGE (ML) INTRAVENOUS ONCE
Refills: 0 | Status: DISCONTINUED | OUTPATIENT
Start: 2019-10-16 | End: 2019-10-19

## 2019-10-16 RX ORDER — COLCHICINE 0.6 MG
0.6 TABLET ORAL ONCE
Refills: 0 | Status: COMPLETED | OUTPATIENT
Start: 2019-10-16 | End: 2019-10-16

## 2019-10-16 RX ORDER — BUMETANIDE 0.25 MG/ML
2 INJECTION INTRAMUSCULAR; INTRAVENOUS DAILY
Refills: 0 | Status: DISCONTINUED | OUTPATIENT
Start: 2019-10-17 | End: 2019-10-17

## 2019-10-16 RX ORDER — SODIUM CHLORIDE 9 MG/ML
1000 INJECTION, SOLUTION INTRAVENOUS
Refills: 0 | Status: DISCONTINUED | OUTPATIENT
Start: 2019-10-16 | End: 2019-10-19

## 2019-10-16 RX ORDER — GLUCAGON INJECTION, SOLUTION 0.5 MG/.1ML
1 INJECTION, SOLUTION SUBCUTANEOUS ONCE
Refills: 0 | Status: DISCONTINUED | OUTPATIENT
Start: 2019-10-16 | End: 2019-10-19

## 2019-10-16 RX ORDER — INSULIN GLARGINE 100 [IU]/ML
21 INJECTION, SOLUTION SUBCUTANEOUS AT BEDTIME
Refills: 0 | Status: DISCONTINUED | OUTPATIENT
Start: 2019-10-16 | End: 2019-10-16

## 2019-10-16 RX ORDER — DEXTROSE 50 % IN WATER 50 %
15 SYRINGE (ML) INTRAVENOUS ONCE
Refills: 0 | Status: DISCONTINUED | OUTPATIENT
Start: 2019-10-16 | End: 2019-10-19

## 2019-10-16 RX ORDER — ASPIRIN/CALCIUM CARB/MAGNESIUM 324 MG
81 TABLET ORAL DAILY
Refills: 0 | Status: DISCONTINUED | OUTPATIENT
Start: 2019-10-16 | End: 2019-10-19

## 2019-10-16 RX ORDER — METOPROLOL TARTRATE 50 MG
5 TABLET ORAL ONCE
Refills: 0 | Status: COMPLETED | OUTPATIENT
Start: 2019-10-16 | End: 2019-10-16

## 2019-10-16 RX ORDER — INSULIN GLARGINE 100 [IU]/ML
21 INJECTION, SOLUTION SUBCUTANEOUS AT BEDTIME
Refills: 0 | Status: DISCONTINUED | OUTPATIENT
Start: 2019-10-16 | End: 2019-10-19

## 2019-10-16 RX ORDER — METOPROLOL TARTRATE 50 MG
50 TABLET ORAL ONCE
Refills: 0 | Status: COMPLETED | OUTPATIENT
Start: 2019-10-16 | End: 2019-10-16

## 2019-10-16 RX ORDER — BUMETANIDE 0.25 MG/ML
2 INJECTION INTRAMUSCULAR; INTRAVENOUS ONCE
Refills: 0 | Status: COMPLETED | OUTPATIENT
Start: 2019-10-16 | End: 2019-10-16

## 2019-10-16 RX ORDER — INSULIN LISPRO 100/ML
VIAL (ML) SUBCUTANEOUS AT BEDTIME
Refills: 0 | Status: DISCONTINUED | OUTPATIENT
Start: 2019-10-16 | End: 2019-10-19

## 2019-10-16 RX ORDER — DEXTROSE 50 % IN WATER 50 %
12.5 SYRINGE (ML) INTRAVENOUS ONCE
Refills: 0 | Status: DISCONTINUED | OUTPATIENT
Start: 2019-10-16 | End: 2019-10-19

## 2019-10-16 RX ORDER — INFLUENZA VIRUS VACCINE 15; 15; 15; 15 UG/.5ML; UG/.5ML; UG/.5ML; UG/.5ML
0.5 SUSPENSION INTRAMUSCULAR ONCE
Refills: 0 | Status: COMPLETED | OUTPATIENT
Start: 2019-10-16 | End: 2019-10-16

## 2019-10-16 RX ORDER — METOPROLOL TARTRATE 50 MG
100 TABLET ORAL DAILY
Refills: 0 | Status: DISCONTINUED | OUTPATIENT
Start: 2019-10-16 | End: 2019-10-19

## 2019-10-16 RX ORDER — INSULIN LISPRO 100/ML
VIAL (ML) SUBCUTANEOUS
Refills: 0 | Status: DISCONTINUED | OUTPATIENT
Start: 2019-10-16 | End: 2019-10-19

## 2019-10-16 RX ADMIN — Medication 81 MILLIGRAM(S): at 10:35

## 2019-10-16 RX ADMIN — Medication 2: at 17:00

## 2019-10-16 RX ADMIN — BUMETANIDE 2 MILLIGRAM(S): 0.25 INJECTION INTRAMUSCULAR; INTRAVENOUS at 17:44

## 2019-10-16 RX ADMIN — INSULIN GLARGINE 21 UNIT(S): 100 INJECTION, SOLUTION SUBCUTANEOUS at 22:24

## 2019-10-16 RX ADMIN — Medication 2: at 22:26

## 2019-10-16 RX ADMIN — Medication 0.6 MILLIGRAM(S): at 16:49

## 2019-10-16 RX ADMIN — Medication 100 MILLIGRAM(S): at 16:50

## 2019-10-16 RX ADMIN — Medication 50 MILLIGRAM(S): at 10:35

## 2019-10-16 RX ADMIN — Medication 100 MILLIGRAM(S): at 16:49

## 2019-10-16 RX ADMIN — DABIGATRAN ETEXILATE MESYLATE 150 MILLIGRAM(S): 150 CAPSULE ORAL at 16:49

## 2019-10-16 RX ADMIN — Medication 5 MILLIGRAM(S): at 23:05

## 2019-10-16 NOTE — H&P ADULT - NSICDXPASTSURGICALHX_GEN_ALL_CORE_FT
PAST SURGICAL HISTORY:  S/P carotid endarterectomy left    Status post angioplasty with stent LULU x 3 2/7/2014

## 2019-10-16 NOTE — ED ADULT NURSE NOTE - OBJECTIVE STATEMENT
65 y/o M pt, w/ pmh a-fib, MI, CAD with stent time 3, TIA, DM2, HTN, HLD, present to ED for chest pain and SOB, pt report chest pain started at 2am, woke him with the pain, pain is pressure, heaviness, burning, which is constant, midsternal and radiating to right chest, worsen with deep breathing, on exam pt A&Ox3, breathing spontaneous, unlabored w/o distress on room air, lungs B/L CTA, 6/10 midsternal pain, peripheral pulse strong and present, CM a-fib, pt took 3 81mg of aspirin at home, denies numbness, tingling, weakness, fever, chills, N/V/D, seen and eval by ralph EL placed, labs drawn and sent

## 2019-10-16 NOTE — H&P ADULT - ASSESSMENT
pt w/ hx cad / mi/dm/htn / w/ [facility]   r/o acs  trops   pericarditis  echo  may need steroids if confirmed  renal eval  pl effusion   pulm eval  low d dimer/w ac  makes p e unlikely  tele  c/w outp meds  dm  fsg riss

## 2019-10-16 NOTE — CONSULT NOTE ADULT - ASSESSMENT
64 year old male, , with Pmed hx sig for cad/dm/ htn / mi /ckd   with acute onset of chest pain and sob on presentation 2 weeks ago he had a severe gout attack. pt states 2 years ago had gained 60 lbs and was in alex with creatinine as high as upto > 5 mg/dl. it slowly started to recover and recently 1.9.       1- ckd III  2- microscopic hematuria in past as above  3- proteinuria  4- suspect hx of chf       creatinine seems baseline   urinalysis   urine protein/cr ratio   cont metolazone and bumex 2mg qd

## 2019-10-16 NOTE — CHART NOTE - NSCHARTNOTEFT_GEN_A_CORE
Pt presents with CHF exasperation and pericarditis     Radiology call preliminary report to PA  CT chest w/o contrast- B/L pleural effusion R>L (similar to CT chest from last year)                                  - trace pneumothorax of R lung    Vital Signs Last 24 Hrs  T(C): 36.4 (16 Oct 2019 21:04), Max: 36.5 (16 Oct 2019 09:16)  T(F): 97.5 (16 Oct 2019 21:04), Max: 97.7 (16 Oct 2019 09:16)  HR: 93 (16 Oct 2019 21:04) (79 - 112)  BP: 132/85 (16 Oct 2019 21:04) (118/80 - 154/88)  BP(mean): --  RR: 20 (16 Oct 2019 21:04) (20 - 22)  SpO2: 92% (16 Oct 2019 21:04) (92% - 100%)    Physical exam:   - Pt is in mild distress with chest pain from pericarditis  - Lungs- low breath sounds to auscultation b/l      Plan:   - d/w attending,  Base  - called thoracic surgery for consult  - c/w colchicine for pericarditis      SUSAN Mallory  [id number] Patient is a 64y old Male with PMHx of HTN, hyperlipidemia, CKD, Afib on pradaxa, s/p DCCV, CAD, NSTEMI, s/p PCI to mid LAD, prox and distal LCx, CHF, normal EF, DM, CVA, severe left internal carotid disease, and s/p CEA (2014) presents with a chief complaint of intermittent chest pressure since 3AM.     Vital Signs Last 24 Hrs  T(C): 36.9 (16 Oct 2019 22:55), Max: 36.9 (16 Oct 2019 22:55)  T(F): 98.5 (16 Oct 2019 22:55), Max: 98.5 (16 Oct 2019 22:55)  HR: 121 (16 Oct 2019 22:55) (79 - 121)  BP: 157/76 (16 Oct 2019 22:55) (118/80 - 157/76)  BP(mean): --  RR: 20 (16 Oct 2019 22:55) (20 - 22)  SpO2: 93% (16 Oct 2019 22:55) (92% - 100%)      Labs:                        16.5   11.63 )-----------( 195      ( 16 Oct 2019 10:41 )             53.2     10-16    136  |  97  |  43<H>  ----------------------------<  213<H>  3.9   |  27  |  1.68<H>    Ca    8.3<L>      16 Oct 2019 14:38    TPro  7.4  /  Alb  3.3  /  TBili  0.5  /  DBili  x   /  AST  16  /  ALT  6<L>  /  AlkPhos  113  10-16        Radiology:  CHest CT w/o contrast- preliminary report- Small right and trace left pleural effusion and   bibasilar compressive atelectasis. Trace right pneumothorax.    Physical Exam:  General: WN/WD, sitting upright and leaning forward to relieve pressure off chest  Neurology: A&Ox3, nonfocal, ROA x 4  Head:  Normocephalic, atraumatic  Respiratory: CTA B/L with low breath sounds throughout   CV: irregularly irregular   Abdominal: Soft, NT, ND no palpable mass  MSK: No edema, + peripheral pulses, FROM all 4 extremity    Assessment & Plan:  HPI:  64 year old male, , with Pmed hx sig for cad/dm/ htn / mi /ckd   with acute onset of chest pain and sob that started last night. Reports pain woke him from sleep. non-radiating , left sided chest pain, worse with inspiration, worse when laying flat. Prior to this event he denies exertional symptoms. He reports some  FAYE, but denies LE edema, cough, or palps.  He states 2 weeks ago he had a severe gout attack.   He reports compliance with all cardiac meds including a/c. Denies recent sick contacts, recent travel, no fever, chills, sore throat, n/v.     Problem #1- Trace pneumothorax on CT chest   - D/w attending, Dr. Corral  - called thoracic surgery for consult  - c/w colchicine for pericarditis    Problem #2- Tachycardia  - 121bpm at 22:55 s/p metoprolol 100mg PO at 17:00  - ordered 5mg metoprolol IV stat   - monitor BP and HR        SUSAN Mallory  [id number] Patient is a 64y old Male with PMHx of HTN, hyperlipidemia, CKD, Afib on pradaxa, s/p DCCV, CAD, NSTEMI, s/p PCI to mid LAD, prox and distal LCx, CHF, normal EF, DM, CVA, severe left internal carotid disease, and s/p CEA (2014) presents with a chief complaint of intermittent chest pressure since 3AM.     Vital Signs Last 24 Hrs  T(C): 36.9 (16 Oct 2019 22:55), Max: 36.9 (16 Oct 2019 22:55)  T(F): 98.5 (16 Oct 2019 22:55), Max: 98.5 (16 Oct 2019 22:55)  HR: 121 (16 Oct 2019 22:55) (79 - 121)  BP: 157/76 (16 Oct 2019 22:55) (118/80 - 157/76)  BP(mean): --  RR: 20 (16 Oct 2019 22:55) (20 - 22)  SpO2: 93% (16 Oct 2019 22:55) (92% - 100%)      Labs:                        16.5   11.63 )-----------( 195      ( 16 Oct 2019 10:41 )             53.2     10-16    136  |  97  |  43<H>  ----------------------------<  213<H>  3.9   |  27  |  1.68<H>    Ca    8.3<L>      16 Oct 2019 14:38    TPro  7.4  /  Alb  3.3  /  TBili  0.5  /  DBili  x   /  AST  16  /  ALT  6<L>  /  AlkPhos  113  10-16        Radiology:  CHest CT w/o contrast- preliminary report- Small right and trace left pleural effusion and   bibasilar compressive atelectasis. Trace right pneumothorax.    Physical Exam:  General: WN/WD, sitting upright and leaning forward to relieve pressure off chest  Neurology: A&Ox3, nonfocal, ROA x 4  Head:  Normocephalic, atraumatic  Respiratory: CTA B/L with low breath sounds throughout   CV: irregularly irregular   Abdominal: Soft, NT, ND no palpable mass  MSK: No edema, + peripheral pulses, FROM all 4 extremity    Assessment & Plan:  HPI:  64 year old male, , with Pmed hx sig for cad/dm/ htn / mi /ckd   with acute onset of chest pain and sob that started last night. Reports pain woke him from sleep. non-radiating , left sided chest pain, worse with inspiration, worse when laying flat. Prior to this event he denies exertional symptoms. He reports some  FAYE, but denies LE edema, cough, or palps.  He states 2 weeks ago he had a severe gout attack.   He reports compliance with all cardiac meds including a/c. Denies recent sick contacts, recent travel, no fever, chills, sore throat, n/v.     Problem #1- Trace pneumothorax on CT chest   - D/w attending, Dr. Corral  - called thoracic surgery for consult  - c/w colchicine for pericarditis    Problem #2- AFib RVR  - 121bpm at 22:55 s/p metoprolol 100mg PO at 17:00  - ordered 5mg metoprolol IV stat   - monitor BP and HR        SUSAN Mallory  [id number]

## 2019-10-16 NOTE — ED ADULT NURSE REASSESSMENT NOTE - NS ED NURSE REASSESS COMMENT FT1
pt remain stable in ED, A&Ox3, breathing spontaneous, unlabored w/o distress on RA, NAD, CM a-fib, denies chest pain or SOB, repeat cardiac enzymes drawn and sent, admitted to tele for chest pain, awaits bed

## 2019-10-16 NOTE — ED PROVIDER NOTE - NS ED ROS FT
Constitutional: No fever or chills  Eyes: No visual changes, eye pain or redness  HEENT: No throat pain, ear pain, nasal pain. No nose bleeding.  CV: +chest pain, minimal lower extremity edema  Resp: +sob, no cough  GI: No abd pain. No nausea or vomiting. No diarrhea. No constipation.   : No dysuria, hematuria.   MSK: No musculoskeletal pain  Skin: No rash  Neuro: No headache. No numbness or tingling. No weakness.

## 2019-10-16 NOTE — CONSULT NOTE ADULT - ASSESSMENT
63 y/o M with PMH of CAD s/p stents, HFrEF (EF 40% 12/2018), DMT2, HTN, CKD, NSTEMI, Afib (on Pradaxa) with acute onset of chest pain last night. Patient now states pain has migrated to R sided chest, better than prior, pleuritic in nature, improves with splinting, improved with leaning forward. Called to eval for bilateral R>L effusions. 97% on RA. Denies fever, chills, LE pain, palpitations, hemoptysis. +cough with clear phlegm.

## 2019-10-16 NOTE — ED PROVIDER NOTE - PHYSICAL EXAMINATION
GEN: Well Appearing, Nontoxic, NAD  HEENT: NC/AT, Symm Facies. PERRL, EOMI, MMM, posterior pharynx clear  CV: No JVD/Bruits or stridor;  +S1S2, RRR w/o m/g/r  RESP: CTAB w/o w/r/r  ABD: Soft, nt/nd, +BS. No guarding/rebound. No RUQ tender, no CVAT  EXT/MSK: mild lower extremity edema, no calf tenderness. WWP, palpable pulses. FROMx4  SKIN: No erythema, lesions or rash  Neuro: Grossly intact, AOX3 with normal speech, CN II-XII intact; Sensation intact, motor 5/5 throughout. Gait normal GEN: Well Appearing, Nontoxic, NAD  HEENT: NC/AT, Symm Facies. PERRL, EOMI, MMM, posterior pharynx clear  CV: No JVD/Bruits or stridor;  +S1S2, irregularly irregular and tachycardic.   RESP: CTAB w/o w/r/r  ABD: Soft, nt/nd, +BS. No guarding/rebound. No RUQ tender, no CVAT  EXT/MSK: mild lower extremity edema, no calf tenderness. WWP, palpable pulses. FROMx4  SKIN: No erythema, lesions or rash  Neuro: Grossly intact, AOX3 with normal speech, CN II-XII intact; Sensation intact, motor 5/5 throughout. Gait normal

## 2019-10-16 NOTE — CONSULT NOTE ADULT - ASSESSMENT
64 year old male with Hypertension, Hyperlipidemia, Atrial Fibrillation, s/p DCCV, Coronary Artery Disease, NSTEMI, (2/2014), s/p PCI to mid LAD, Proximal LCx, Distal LCx (2/14, [facility], [facility]), Congestive Heart Failure, Normal Ejection Fraction, Diabetes Mellitus, CVA, Severe Left Internal Carotid Disease, s/p CEA (2/2014) presenting with chest pain and sob     1. Atypical chest pain,  Coronary Artery Disease. S/P PCI to LAD, LCx.   pleuritic and reproducible   r/o resp infection ? pericarditis ?   no evidence of ACS, D-dimer negative  recent gout flare up, check echo to r/o pericarditis   check uric acid   Continue ASA 81mg and statin  HS trop x 1 elevated likely secondary to renal disease, known CAD   add 2 nd trop and check CK.CKMB   pt known 64 year old male with Hypertension, Hyperlipidemia, Atrial Fibrillation, s/p DCCV, Coronary Artery Disease, NSTEMI, (2/2014), s/p PCI to mid LAD, Proximal LCx, Distal LCx (2/14, [facility], [facility]), Congestive Heart Failure, Normal Ejection Fraction, Diabetes Mellitus, CVA, Severe Left Internal Carotid Disease, s/p CEA (2/2014) presenting with chest pain and sob     1. Atypical chest pain,  Coronary Artery Disease. S/P PCI to LAD, LCx.   pleuritic and reproducible   r/o resp infection ? pericarditis ?   no evidence of ACS, D-dimer negative  recent gout flare up, check echo to r/o pericarditis   check uric acid , leukocytosis noted ; check RVP   HS trop x 1 elevated likely secondary to renal disease, known CAD   add 2 nd trop and check CK.CKMB   pt known to be noncompliant with cv meds   CTA chest recommended but patient refused   chest xray with sml bl pleural effusions    Continue ASA 81mg and statin    2. Chronic Congestive heart failure, Diastolic.   does not overtly look overloaded on exam   chest xray with small BL pleural effusion   given 2 mg bumex IVP x 1 now   Resume bumex 2mg daily (start tomorrow)  with zaroxloyn 5mg 1 x weekly.  echo     3. Atrial Fibrillation.  mildly RVR noted, resume  metoprolol succinate ER 100mg daily  Continue pradaxa 150mg BID    dvt ppx

## 2019-10-16 NOTE — ED PROVIDER NOTE - PROGRESS NOTE DETAILS
patient seen by Dr. Mazariegos's NP Amie, stating no urgent need for cardiac catheterization at this time. patient noncompliant with medications. Given nature of chest pain as pleuritic, will obtain dd with troponin, continue to reassess and tba Dr. Corral who saw and evaluated patient already. -Susie Agudelo PA-C

## 2019-10-16 NOTE — CONSULT NOTE ADULT - ATTENDING COMMENTS
Patient seen and examined, agree with the above assessment and plan by JOYCE Tompkins.  pt well known to out practice  64 year old male with Hypertension, Hyperlipidemia, Atrial Fibrillation, s/p DCCV, Coronary Artery Disease, NSTEMI, (2/2014), s/p PCI to mid LAD, Proximal LCx, Distal LCx (2/14, [facility], [facility]), Congestive Heart Failure, Normal Ejection Fraction, Diabetes Mellitus, CVA, Severe Left Internal Carotid Disease, s/p CEA (2/2014) presenting with pleuritc chest pain and dyspnea  Pain worse w inspiration and supine, no exertional component, +dyspnea due inability to take full breaths  etiology unclear, possible pericarditis  rule out PE  give bumex IV x 1   consider additional imaging of the chest to rule out infection given mild leucocytosis  treat for possible pericarditis  colchicine daily  renal eval

## 2019-10-16 NOTE — CONSULT NOTE ADULT - PROBLEM SELECTOR RECOMMENDATION 9
Atypical CP  -?pericarditis   -f/u cardiac enzymes, f/u TTE  -Less likely PE, patient is already on Pradaxa and LE edema is improved for him. Would hold off on VQ scan Atypical CP  -?pericarditis   -f/u cardiac enzymes, f/u TTE  -Less likely PE, patient is already on Pradaxa and LE edema is improved for him. Would hold off on VQ scan  -CT chest non contrast

## 2019-10-16 NOTE — ED PROVIDER NOTE - OBJECTIVE STATEMENT
65 y/o M pmhx afib on Pradaxa, MI in the past CAD s/p 3 stents, TIA, DM2, HTN, HLD, presenting with chest pain since last night. Patient states pain is in the left side of his chest, is sharp but 'also feels like pressure' that has been constant since it started. Reports woke him from sleep. States pain is significantly worse when he tries to take a deep breath so he has been having shortness of breath and difficulty breathing since it started. Reports placed on O2 here which helps with his symptoms but not on O2 at home. 65 y/o M pmhx afib on Pradaxa, MI in the past CAD s/p 3 stents, TIA, DM2, HTN, HLD, presenting with chest pain since last night. Patient states pain is in the left side of his chest, is sharp but 'also feels like pressure' that has been constant since it started. Reports woke him from sleep. States pain is significantly worse when he tries to take a deep breath so he has been having shortness of breath and difficulty breathing since it started. Reports placed on O2 here which helps with his symptoms but not on O2 at home.    Attendinyo male presents with pressure in the chest intermittently since 3am today.  no fever.  no cough.  feels comfortable at this time.

## 2019-10-16 NOTE — H&P ADULT - NSHPLABSRESULTS_GEN_ALL_CORE
16.5   11.63 )-----------( 195      ( 16 Oct 2019 10:41 )             53.2       10-16    138  |  94<L>  |  48<H>  ----------------------------<  255<H>  4.8   |  29  |  1.86<H>    Ca    9.0      16 Oct 2019 10:41    TPro  7.4  /  Alb  3.3  /  TBili  0.5  /  DBili  x   /  AST  16  /  ALT  6<L>  /  AlkPhos  113  10-16                  PT/INR - ( 16 Oct 2019 10:41 )   PT: 13.7 sec;   INR: 1.19 ratio         PTT - ( 16 Oct 2019 10:41 )  PTT:41.5 sec    Lactate Trend            CAPILLARY BLOOD GLUCOSE

## 2019-10-16 NOTE — ED PROVIDER NOTE - CLINICAL SUMMARY MEDICAL DECISION MAKING FREE TEXT BOX
65 y/o M pmhx afib on pradaxa p/w chest pain and shortness of breath woke from sleep last night, sent in by Dr. Mazariegos. PE remarkable for mild lower extremity edema. Will d/w Dr. Mazariegos, ACS work-up, dd, cxr and reassess TBA

## 2019-10-16 NOTE — H&P ADULT - NSICDXPASTMEDICALHX_GEN_ALL_CORE_FT
PAST MEDICAL HISTORY:  Atrial fibrillation     CAD (coronary artery disease)     DM type 2 (diabetes mellitus, type 2)     HLD (hyperlipidemia)     HTN (hypertension), benign     MI (myocardial infarction) circa 2014    TIA (transient ischemic attack)

## 2019-10-16 NOTE — H&P ADULT - NEUROLOGICAL DETAILS
alert and oriented x 3/responds to verbal commands/responds to pain/sensation intact/cranial nerves intact/deep reflexes intact

## 2019-10-16 NOTE — CONSULT NOTE ADULT - PROBLEM SELECTOR RECOMMENDATION 2
R>L effusions on CXR  -Present in 12/2018, but increased now compared to then  -Normoxic and no distress  -Likely r/t CHF  -Bumex as tolerated

## 2019-10-16 NOTE — H&P ADULT - HISTORY OF PRESENT ILLNESS
64 year old male, , with Pmed hx sig for cad/dm/ htn / mi /ckd   with acute onset of chest pain and sob that started last night  Reports pain woke him from sleep. non-radiating , left sided chest pain, worse with inspiration, worse when laying flat. Prior to this event he denies exertional symptoms.   He reportssome  FAYE, but denies LE edema, cough, or palps.   He states 2 weeks ago he had a severe gout attack.    He reports compliance with all cardiac meds including a/c. Denies recent sick contacts, recent travel, no fever, chills, sore throat, n/v. ROS otherwise negative.

## 2019-10-17 DIAGNOSIS — J93.9 PNEUMOTHORAX, UNSPECIFIED: ICD-10-CM

## 2019-10-17 LAB
APPEARANCE UR: CLEAR — SIGNIFICANT CHANGE UP
BILIRUB UR-MCNC: NEGATIVE — SIGNIFICANT CHANGE UP
COLOR SPEC: SIGNIFICANT CHANGE UP
CREAT ?TM UR-MCNC: 89 MG/DL — SIGNIFICANT CHANGE UP
DIFF PNL FLD: ABNORMAL
GLUCOSE BLDC GLUCOMTR-MCNC: 220 MG/DL — HIGH (ref 70–99)
GLUCOSE BLDC GLUCOMTR-MCNC: 253 MG/DL — HIGH (ref 70–99)
GLUCOSE BLDC GLUCOMTR-MCNC: 283 MG/DL — HIGH (ref 70–99)
GLUCOSE BLDC GLUCOMTR-MCNC: 365 MG/DL — HIGH (ref 70–99)
GLUCOSE UR QL: ABNORMAL
HBA1C BLD-MCNC: 10.3 % — HIGH (ref 4–5.6)
HCT VFR BLD CALC: 56.1 % — HIGH (ref 39–50)
HGB BLD-MCNC: 16.9 G/DL — SIGNIFICANT CHANGE UP (ref 13–17)
KETONES UR-MCNC: NEGATIVE — SIGNIFICANT CHANGE UP
LEUKOCYTE ESTERASE UR-ACNC: NEGATIVE — SIGNIFICANT CHANGE UP
MCHC RBC-ENTMCNC: 24.8 PG — LOW (ref 27–34)
MCHC RBC-ENTMCNC: 30.1 GM/DL — LOW (ref 32–36)
MCV RBC AUTO: 82.3 FL — SIGNIFICANT CHANGE UP (ref 80–100)
NITRITE UR-MCNC: NEGATIVE — SIGNIFICANT CHANGE UP
PH UR: 6.5 — SIGNIFICANT CHANGE UP (ref 5–8)
PLATELET # BLD AUTO: 190 K/UL — SIGNIFICANT CHANGE UP (ref 150–400)
PROT ?TM UR-MCNC: 370 MG/DL — HIGH (ref 0–12)
PROT UR-MCNC: ABNORMAL
PROT/CREAT UR-RTO: 4.2 RATIO — HIGH (ref 0–0.2)
RAPID RVP RESULT: SIGNIFICANT CHANGE UP
RBC # BLD: 6.82 M/UL — HIGH (ref 4.2–5.8)
RBC # FLD: 17.2 % — HIGH (ref 10.3–14.5)
SP GR SPEC: 1.01 — SIGNIFICANT CHANGE UP (ref 1.01–1.02)
UROBILINOGEN FLD QL: NEGATIVE — SIGNIFICANT CHANGE UP
WBC # BLD: 11.25 K/UL — HIGH (ref 3.8–10.5)
WBC # FLD AUTO: 11.25 K/UL — HIGH (ref 3.8–10.5)

## 2019-10-17 PROCEDURE — 93306 TTE W/DOPPLER COMPLETE: CPT | Mod: 26

## 2019-10-17 RX ORDER — BUMETANIDE 0.25 MG/ML
2 INJECTION INTRAMUSCULAR; INTRAVENOUS DAILY
Refills: 0 | Status: DISCONTINUED | OUTPATIENT
Start: 2019-10-17 | End: 2019-10-18

## 2019-10-17 RX ADMIN — BUMETANIDE 2 MILLIGRAM(S): 0.25 INJECTION INTRAMUSCULAR; INTRAVENOUS at 22:04

## 2019-10-17 RX ADMIN — DABIGATRAN ETEXILATE MESYLATE 150 MILLIGRAM(S): 150 CAPSULE ORAL at 17:42

## 2019-10-17 RX ADMIN — Medication 6: at 13:04

## 2019-10-17 RX ADMIN — BUMETANIDE 2 MILLIGRAM(S): 0.25 INJECTION INTRAMUSCULAR; INTRAVENOUS at 05:21

## 2019-10-17 RX ADMIN — Medication 4: at 08:43

## 2019-10-17 RX ADMIN — Medication 81 MILLIGRAM(S): at 08:37

## 2019-10-17 RX ADMIN — Medication 3: at 21:57

## 2019-10-17 RX ADMIN — INSULIN GLARGINE 21 UNIT(S): 100 INJECTION, SOLUTION SUBCUTANEOUS at 21:56

## 2019-10-17 RX ADMIN — Medication 100 MILLIGRAM(S): at 05:21

## 2019-10-17 RX ADMIN — DABIGATRAN ETEXILATE MESYLATE 150 MILLIGRAM(S): 150 CAPSULE ORAL at 05:21

## 2019-10-17 RX ADMIN — Medication 6: at 17:46

## 2019-10-17 RX ADMIN — Medication 100 MILLIGRAM(S): at 17:42

## 2019-10-17 NOTE — PROGRESS NOTE ADULT - SUBJECTIVE AND OBJECTIVE BOX
CHIEF COMPLAINT:Patient is a 64y old  Male who presents with a chief complaint of   	        PAST MEDICAL & SURGICAL HISTORY:  CAD (coronary artery disease)  MI (myocardial infarction): circa 2014  Atrial fibrillation  TIA (transient ischemic attack)  DM type 2 (diabetes mellitus, type 2)  HLD (hyperlipidemia)  HTN (hypertension), benign  S/P carotid endarterectomy: left  Status post angioplasty with stent: LULU x 3 2/7/2014          REVIEW OF SYSTEMS:  weak  EYES: No eye pain, visual disturbances, or discharge  NECK: No pain or stiffness  RESPIRATORY: sob  CARDIOVASCULAR: No chest pain this am   GASTROINTESTINAL: No abdominal or epigastric pain. No nausea, vomiting, or hematemesis; No diarrhea or constipation. No melena or hematochezia.  GENITOURINARY: No dysuria, frequency, hematuria, or incontinence  NEUROLOGICAL: No headaches, memory loss, loss of strength, numbness, or tremors    MUSCULOSKELETAL: No joint pain or swelling; No muscle, back, or extremity pain    Medications:  MEDICATIONS  (STANDING):  aspirin enteric coated 81 milliGRAM(s) Oral daily  buMETAnide 2 milliGRAM(s) Oral daily  dabigatran 150 milliGRAM(s) Oral two times a day  dextrose 5%. 1000 milliLiter(s) (50 mL/Hr) IV Continuous <Continuous>  dextrose 50% Injectable 12.5 Gram(s) IV Push once  dextrose 50% Injectable 25 Gram(s) IV Push once  dextrose 50% Injectable 25 Gram(s) IV Push once  influenza   Vaccine 0.5 milliLiter(s) IntraMuscular once  insulin glargine Injectable (LANTUS) 21 Unit(s) SubCutaneous at bedtime  insulin lispro (HumaLOG) corrective regimen sliding scale   SubCutaneous three times a day before meals  insulin lispro (HumaLOG) corrective regimen sliding scale   SubCutaneous at bedtime  metolazone 5 milliGRAM(s) Oral <User Schedule>  metoprolol succinate  milliGRAM(s) Oral daily  pregabalin 100 milliGRAM(s) Oral two times a day    MEDICATIONS  (PRN):  dextrose 40% Gel 15 Gram(s) Oral once PRN Blood Glucose LESS THAN 70 milliGRAM(s)/deciliter  glucagon  Injectable 1 milliGRAM(s) IntraMuscular once PRN Glucose LESS THAN 70 milligrams/deciliter    	    PHYSICAL EXAM:  T(C): 36.6 (10-17-19 @ 05:08), Max: 36.9 (10-16-19 @ 22:55)  HR: 96 (10-17-19 @ 05:08) (79 - 121)  BP: 143/83 (10-17-19 @ 05:08) (118/80 - 157/76)  RR: 18 (10-17-19 @ 05:08) (18 - 22)  SpO2: 93% (10-17-19 @ 05:08) (92% - 100%)  Wt(kg): --  I&O's Summary    16 Oct 2019 07:01  -  17 Oct 2019 07:00  --------------------------------------------------------  IN: 450 mL / OUT: 1050 mL / NET: -600 mL        Appearance: Normal	  HEENT:   Normal oral mucosa, PERRL, EOMI	  Lymphatic: No lymphadenopathy  Cardiovascular: Normal S1 S2, No JVD,  +edema    Respiratory: dec bs   Psychiatry: A & O x 3, Mood & affect appropriate  Gastrointestinal:  Soft, Non-tender, + BS	  Skin: No rashes, No ecchymoses, No cyanosis	  Neurologic: Non-focal  Extremities: Normal range of motion, No clubbing,   Vascular: Peripheral pulses palpable   pvd     TELEMETRY: 	    ECG:  	  RADIOLOGY:  OTHER: 	  	  LABS:	 	    CARDIAC MARKERS:  CARDIAC MARKERS ( 16 Oct 2019 14:38 )  x     / x     / 80 U/L / x     / 4.0 ng/mL                                16.9   11.25 )-----------( 190      ( 17 Oct 2019 07:43 )             56.1     10-16    136  |  97  |  43<H>  ----------------------------<  213<H>  3.9   |  27  |  1.68<H>    Ca    8.3<L>      16 Oct 2019 14:38    TPro  7.4  /  Alb  3.3  /  TBili  0.5  /  DBili  x   /  AST  16  /  ALT  6<L>  /  AlkPhos  113  10-16    proBNP: Serum Pro-Brain Natriuretic Peptide: 1683 pg/mL (10-16 @ 14:38)    Lipid Profile:   HgA1c:   TSH:

## 2019-10-17 NOTE — PROGRESS NOTE ADULT - ASSESSMENT
pt w/ hx cad / mi/dm/htn / w/ [facility]   smallptx  pulm f/u  pericarditis likely  echo  renal eval noted  pl effusion   pulm  to f/u  tele  c/w outp meds  dm  fsg julia alfaro as able

## 2019-10-17 NOTE — PROGRESS NOTE ADULT - PROBLEM SELECTOR PLAN 1
Small R hydroPTX of unclear etiology  -No evidence of trauma or rib fractures, no recent procedures. PTX is new since 12/2018  -Suggest placement of R chest tube for drainage of effusion and sampling of fluid and for re-expansion of lung given PTX  -Patient would like to think about it until tomorrow  -Pradaxa would need to be held prior to any planned procedure and likely could not be done until Monday  -d/w Dr. Camargo Small R hydroPTX of unclear etiology  -No evidence of trauma or rib fractures, no recent procedures. PTX is new since 12/2018  -Suggest placement of R chest tube for drainage of effusion and sampling of fluid and analysis  -Patient would like to think about it until tomorrow  -Pradaxa would need to be held prior to any planned procedure and likely could not be done until Monday  -d/w Dr. Camargo

## 2019-10-17 NOTE — CONSULT NOTE ADULT - ASSESSMENT
64M w/ trace R PTX, R pleural effusion > L in setting of CHF, pericarditis    - no acute intervention for PTX as it is very small and asymptomatic  - will d/w Dr. Mary Jo Enrique MD  Thoracic Surgery  [id number]

## 2019-10-17 NOTE — CONSULT NOTE ADULT - SUBJECTIVE AND OBJECTIVE BOX
GENERAL SURGERY CONSULT NOTE    Patient is a 64y old  Male who presents with a chief complaint of     HPI:  64 year old male, , with Pmed hx sig for cad/dm/ htn / mi /ckd   with acute onset of chest pain and sob that started last night  Reports pain woke him from sleep. non-radiating , left sided chest pain, worse with inspiration, worse when laying flat. Prior to this event he denies exertional symptoms.   He reportssome  FAYE, but denies LE edema, cough, or palps.   He states 2 weeks ago he had a severe gout attack.    He reports compliance with all cardiac meds including a/c. Denies recent sick contacts, recent travel, no fever, chills, sore throat, n/v. ROS otherwise negative. (16 Oct 2019 11:52)    Thoracic surgery consulted in setting of trace R PTX found on chest CT. He says his pain is improving, but is still worse when laying down. He is able to converse without SOB on room air.  He describes an incident similar to this 2-3 years ago where he had "air around his lung" which was conservatively managed. He does mention frequent coughing.    PAST MEDICAL & SURGICAL HISTORY:  CAD (coronary artery disease)  MI (myocardial infarction): circa   Atrial fibrillation  TIA (transient ischemic attack)  DM type 2 (diabetes mellitus, type 2)  HLD (hyperlipidemia)  HTN (hypertension), benign  S/P carotid endarterectomy: left  Status post angioplasty with stent: LULU x 3 2014    [  ] No significant past history as reviewed with the patient and family    FAMILY HISTORY:  Family history of heart disease    [  ] Family history not pertinent as reviewed with the patient and family    SOCIAL HISTORY:    MEDICATIONS  (STANDING):  aspirin enteric coated 81 milliGRAM(s) Oral daily  buMETAnide 2 milliGRAM(s) Oral daily  dabigatran 150 milliGRAM(s) Oral two times a day  dextrose 5%. 1000 milliLiter(s) (50 mL/Hr) IV Continuous <Continuous>  dextrose 50% Injectable 12.5 Gram(s) IV Push once  dextrose 50% Injectable 25 Gram(s) IV Push once  dextrose 50% Injectable 25 Gram(s) IV Push once  influenza   Vaccine 0.5 milliLiter(s) IntraMuscular once  insulin glargine Injectable (LANTUS) 21 Unit(s) SubCutaneous at bedtime  insulin lispro (HumaLOG) corrective regimen sliding scale   SubCutaneous three times a day before meals  insulin lispro (HumaLOG) corrective regimen sliding scale   SubCutaneous at bedtime  metolazone 5 milliGRAM(s) Oral <User Schedule>  metoprolol succinate  milliGRAM(s) Oral daily  pregabalin 100 milliGRAM(s) Oral two times a day    MEDICATIONS  (PRN):  dextrose 40% Gel 15 Gram(s) Oral once PRN Blood Glucose LESS THAN 70 milliGRAM(s)/deciliter  glucagon  Injectable 1 milliGRAM(s) IntraMuscular once PRN Glucose LESS THAN 70 milligrams/deciliter    Allergies    No Known Allergies    Intolerances        Vital Signs Last 24 Hrs  T(C): 36.6 (17 Oct 2019 05:08), Max: 36.9 (16 Oct 2019 22:55)  T(F): 97.9 (17 Oct 2019 05:08), Max: 98.5 (16 Oct 2019 22:55)  HR: 96 (17 Oct 2019 05:08) (79 - 121)  BP: 143/83 (17 Oct 2019 05:08) (118/80 - 157/76)  BP(mean): --  RR: 18 (17 Oct 2019 05:08) (18 - 22)  SpO2: 93% (17 Oct 2019 05:08) (92% - 100%)  Daily Height in cm: 181.61 (16 Oct 2019 16:26)    Daily Weight in k.8 (17 Oct 2019 05:08)    NAD, awake and alert  No rashes  No jaundice or scleral icterus  Respirations nonlabored on room air  Absent breath sounds at R base  CV Regular  Abdomen soft, nontender, nondistended  No guarding or rebound tenderness  Extremities warm                         16.9   11.25 )-----------( 190      ( 17 Oct 2019 07:43 )             56.1     10-    136  |  97  |  43<H>  ----------------------------<  213<H>  3.9   |  27  |  1.68<H>    Ca    8.3<L>      16 Oct 2019 14:38    TPro  7.4  /  Alb  3.3  /  TBili  0.5  /  DBili  x   /  AST  16  /  ALT  6<L>  /  AlkPhos  113  10-16    PT/INR - ( 16 Oct 2019 10:41 )   PT: 13.7 sec;   INR: 1.19 ratio         PTT - ( 16 Oct 2019 10:41 )  PTT:41.5 sec  Urinalysis Basic - ( 17 Oct 2019 04:40 )    Color: Light Yellow / Appearance: Clear / S.015 / pH: x  Gluc: x / Ketone: Negative  / Bili: Negative / Urobili: Negative   Blood: x / Protein: 300 mg/dL / Nitrite: Negative   Leuk Esterase: Negative / RBC: 8 /hpf / WBC 3 /HPF   Sq Epi: x / Non Sq Epi: 0 /hpf / Bacteria: Negative        IMAGING STUDIES:  < from: CT Chest No Cont (10.16.19 @ 20:54) >    PROCEDURE DATE:  10/16/2019      ******PRELIMINARY REPORT******    ******PRELIMINARY REPORT******              INTERPRETATION:  Small right and trace left pleural effusion and   bibasilar compressive atelectasis. Trace right pneumothorax.    Follow-up final report.    Discussed with SUSAN Soriano.        < end of copied text >
[facility]  [phone]  NEPHROLOGY      INITIAL CONSULT NOTE  --------------------------------------------------------------------------------  HPI:    64 year old male, , with Pmed hx sig for cad/dm/ htn / mi /ckd   with acute onset of chest pain and sob that started last night  Reports pain woke him from sleep. non-radiating , left sided chest pain, worse with inspiration, worse when laying flat. Prior to this event he denies exertional symptoms.  2 weeks ago he had a severe gout attack. he was noticed with abn creatinine renal consult called.   pt states 2 years ago had gained 60 lbs and was in alex with creatinine as high as upto > 5 mg/dl. it slowly started to recover and recently 1.9  PAST HISTORY  --------------------------------------------------------------------------------  PAST MEDICAL & SURGICAL HISTORY:  CAD (coronary artery disease)  MI (myocardial infarction): circa 2014  Atrial fibrillation  TIA (transient ischemic attack)  DM type 2 (diabetes mellitus, type 2)  HLD (hyperlipidemia)  HTN (hypertension), benign  S/P carotid endarterectomy: left  Status post angioplasty with stent: LULU x 3 2/7/2014    FAMILY HISTORY:  Family history of heart disease    PAST SOCIAL HISTORY:  no tobacco   ALLERGIES & MEDICATIONS  --------------------------------------------------------------------------------  Allergies    No Known Allergies    Intolerances      Standing Inpatient Medications  aspirin enteric coated 81 milliGRAM(s) Oral daily  dabigatran 150 milliGRAM(s) Oral two times a day  dextrose 5%. 1000 milliLiter(s) IV Continuous <Continuous>  dextrose 50% Injectable 12.5 Gram(s) IV Push once  dextrose 50% Injectable 25 Gram(s) IV Push once  dextrose 50% Injectable 25 Gram(s) IV Push once  influenza   Vaccine 0.5 milliLiter(s) IntraMuscular once  insulin glargine Injectable (LANTUS) 21 Unit(s) SubCutaneous at bedtime  insulin lispro (HumaLOG) corrective regimen sliding scale   SubCutaneous three times a day before meals  insulin lispro (HumaLOG) corrective regimen sliding scale   SubCutaneous at bedtime  metolazone 5 milliGRAM(s) Oral <User Schedule>  metoprolol succinate  milliGRAM(s) Oral daily  pregabalin 100 milliGRAM(s) Oral two times a day    PRN Inpatient Medications  dextrose 40% Gel 15 Gram(s) Oral once PRN  glucagon  Injectable 1 milliGRAM(s) IntraMuscular once PRN      REVIEW OF SYSTEMS  --------------------------------------------------------------------------------  Gen: No  fevers/chills   Skin: No rashes  Head/Eyes/Ears/Mouth: No headache; Normal hearing;  No sinus pain/discomfort, sore throat  Respiratory: No dyspnea, cough, wheezing  CV: No further chest pain, or palp   GI: No abdominal pain, diarrhea, nausea, vomiting, melena  : No dysuria, decrease urination or hesitancy urinating  hematuria, nocturia  MSK: No joint pain/swelling; no back pain  Neuro: No dizziness/lightheadedness,     also with no edema     All other systems were reviewed and are negative, except as noted.    VITALS/PHYSICAL EXAM  --------------------------------------------------------------------------------  T(C): 36.4 (10-16-19 @ 21:04), Max: 36.5 (10-16-19 @ 09:16)  HR: 93 (10-16-19 @ 21:04) (79 - 112)  BP: 132/85 (10-16-19 @ 21:04) (118/80 - 154/88)  RR: 20 (10-16-19 @ 21:04) (20 - 22)  SpO2: 92% (10-16-19 @ 21:04) (92% - 100%)  Wt(kg): --  Height (cm): 181.6 (10-16-19 @ 16:26)  Weight (kg): 139 (10-16-19 @ 16:26)  BMI (kg/m2): 42.1 (10-16-19 @ 16:26)  BSA (m2): 2.54 (10-16-19 @ 16:26)      10-16-19 @ 07:01  -  10-16-19 @ 22:47  --------------------------------------------------------  IN: 450 mL / OUT: 0 mL / NET: 450 mL      Physical Exam:  	Gen: Non toxic comfortable appearing   	no jvd ,  	Pulm: decrease bs  no rales or ronchi or wheezing  	CV: RRR, S1S2; no rub  	Back: No CVA tenderness; no sacral edema  	Abd: +BS, soft, nontender/nondistended  	: No suprapubic tenderness  	UE: Warm, no cyanosis  no clubbing,  no edema  	LE: Warm, no cyanosis  no clubbing, no edema  	Neuro: alert and oriented. speech coherent   	Skin: Warm, no decrease skin turgor but hyperpigmented LE   	    LABS/STUDIES  --------------------------------------------------------------------------------              16.5   11.63 >-----------<  195      [10-16-19 @ 10:41]              53.2     136  |  97  |  43  ----------------------------<  213      [10-16-19 @ 14:38]  3.9   |  27  |  1.68        Ca     8.3     [10-16-19 @ 14:38]    TPro  7.4  /  Alb  3.3  /  TBili  0.5  /  DBili  x   /  AST  16  /  ALT  6   /  AlkPhos  113  [10-16-19 @ 10:41]    PT/INR: PT 13.7 , INR 1.19       [10-16-19 @ 10:41]  PTT: 41.5       [10-16-19 @ 10:41]    CK 80      [10-16-19 @ 14:38]    Creatinine Trend:  SCr 1.68 [10-16 @ 14:38]  SCr 1.86 [10-16 @ 10:41]    Urinalysis - [02-09-17 @ 16:33]      Color Yellow / Appearance SL Turbid / SG 1.013 / pH 5.5      Gluc Trace / Ketone Negative  / Bili Negative / Urobili Negative       Blood Large / Protein 100 / Leuk Est Negative / Nitrite Negative      RBC >50 / WBC 11-25 / Hyaline 0-2 / Gran  / Sq Epi  / Non Sq Epi OCC / Bacteria Moderate      HbA1c 11.0      [12-30-18 @ 08:26]  TSH 1.71      [12-28-18 @ 14:58]    HBsAb Nonreact      [02-05-17 @ 11:45]  HBsAg Nonreact      [02-05-17 @ 11:45]  HCV 0.09, Nonreact      [02-03-17 @ 08:51]    dsDNA <12      [02-05-17 @ 11:45]  C3 Complement 123      [02-05-17 @ 11:45]  C4 Complement 33      [02-05-17 @ 11:45]  MPO-ANCA <5.0, interpretation: Negative      [02-05-17 @ 11:45]  PR3-ANCA <5.0, interpretation: Negative      [02-05-17 @ 11:45]  Free Light Chains: kappa 17.30, lambda 8.42, ratio = 2.05      [02-15 @ 07:29]  Immunofixation Serum:   Weak  IgG Kappa Band Identified    Reference Range: None Detected      [02-17-17 @ 07:53]  SPEP Interpretation: Weak Gamma-Migrating Paraprotein Identified      [02-17-17 @ 07:53]  Immunofixation Urine: No Monoclonal Band Identified      [02-18-17 @ 17:41]  UPEP Interpretation: Mild Selective (Glomerular) Proteinuria      [02-18-17 @ 17:41]
CARDIOLOGY CONSULT - Dr. Mazariegos         HPI:  64 year old male, known to the office, with Pmed hx as mentioned below presenting with acute onset of chest pain and sob that started 2 am today. Reports pain woke him from sleep. He reports non-radiating , 8/10 left sided chest pain, worse with inspiration, worse when laying flat. Prior to this event he denies exertional symptoms. He reports mild FAYE, but denies LE edema, cough, or palps. He states 2 weeks ago he had a severe gout attack. He does not recall if chest pain is similar to when he underwent cardiac cath a few years ago. He reports compliance with all cardiac meds including a/c. Denies recent sick contacts, recent travel, no fever, chills, sore throat, n/v. ROS otherwise negative.       PAST MEDICAL HISTORY:  Hypertension, Hyperlipidemia, Atrial Fibrillation, s/p DCCV, Coronary Artery Disease, NSTEMI, (2014), s/p PCI to mid LAD, Proximal LCx, Distal LCx (, [facility], LULU), Congestive Heart Failure, Normal Ejection Fraction, Diabetes Mellitus, CVA, Severe Left Internal Carotid Disease, s/p CEA (2014)    PAST SURGICAL HISTORY:  Left sided CEA (2014, [facility], [place])    SOCIAL HISTORY:  Denies tobacco, etoh use    FAMILY HISTORY:  Father: CAD    ALLERGIES: No Known Allergies    HOPME MEDICATIONS:  LYRICA 100 MG.................Si capsule 3 times per day for 30 Days Qty: 90  NOVOLOG FLEXPEN 100 UNITS/ML..Si INJECTION QD for 2 Days Qty: 2, 8 units 3 times a day with meals  METOPROLOL SUCCINATE ER 50 MG..Si tablet, extended release QD for 90 Days Qty: 180  METOLAZONE 5 MG...............Si tablet Mon, Wed & Friday for 30 Days Qty: 30  BUMETANIDE 2 MG...............Si tablet QD for 90 Days Qty: 90  BASAGLAR KWIKPEN 100 UNITS/ML..Si solution QD for 30 Days Qty: 30, 21 units once daily  PRADAXA 150 MG................Si capsule every 12 hours for 30 Days Qty: 60  SIMVASTATIN 40 MG.............Si tablet QD for 30 Days Qty: 30, At Bedtime  ASPIRIN 81 MG.................Si tablet QD for 30 Days Qty: 30    MEDICATIONS  (STANDING):  aspirin enteric coated 81 milliGRAM(s) Oral daily  buMETAnide  Oral Tab/Cap - Peds 2 milliGRAM(s) Oral daily  dabigatran 150 milliGRAM(s) Oral two times a day  dextrose 5%. 1000 milliLiter(s) (50 mL/Hr) IV Continuous <Continuous>  dextrose 50% Injectable 12.5 Gram(s) IV Push once  dextrose 50% Injectable 25 Gram(s) IV Push once  dextrose 50% Injectable 25 Gram(s) IV Push once  insulin glargine SubCutaneous Injection (LANTUS) - Peds 21 Unit(s) SubCutaneous at bedtime  insulin lispro (HumaLOG) corrective regimen sliding scale   SubCutaneous three times a day before meals  metoprolol succinate  milliGRAM(s) Oral daily  pregabalin 100 milliGRAM(s) Oral two times a day      PREVIOUS DIAGNOSTIC TESTING:    [ ] Echocardiogram:  ECHO 2018: EF 40%  ECHO 2018: normal LV function    [ ]  Catheterization:  Cath 14: : Severe LAD and LCx disease. PCI of proximal and distal LCx with LULU in the setting of angina/NSTEMI.    [ ] Stress Test:  	        	    REVIEW OF SYSTEMS:  CONSTITUTIONAL: No fever, weight loss, or fatigue  EYES: No eye pain, visual disturbances, or discharge  ENMT:  No difficulty hearing, tinnitus, vertigo; No sinus or throat pain  NECK: No pain or stiffness  RESPIRATORY: see hpi   CARDIOVASCULAR:  see hpi   GASTROINTESTINAL: No abdominal or epigastric pain. No nausea, vomiting, or hematemesis; No diarrhea or constipation. No melena or hematochezia.  GENITOURINARY: No dysuria, frequency, hematuria, or incontinence  NEUROLOGICAL: No headaches, memory loss, loss of strength, numbness, or tremors  SKIN: No itching, burning, rashes, or lesions   	    [ x] All others negative	  [ ] Unable to obtain    PHYSICAL EXAM:  T(C): 36.5 (10-16-19 @ 09:16), Max: 36.5 (10-16-19 @ 09:16)  HR: 112 (10-16-19 @ 10:08) (79 - 112)  BP: 118/80 (10-16-19 @ 10:08) (118/80 - 154/88)  RR: 22 (10-16-19 @ 10:08) (22 - 22)  SpO2: 100% (10-16-19 @ 10:08) (95% - 100%)  Wt(kg): --  I&O's Summary      Appearance: Normal	  Psychiatry: A & O x 3, Mood & affect appropriate  HEENT:   Normal oral mucosa, PERRL, EOMI	  Lymphatic: No lymphadenopathy  Cardiovascular: Normal S1 S2, irregular    Respiratory: diminished 	  Gastrointestinal:  Soft, Non-tender, + BS	  Skin: No rashes, No ecchymoses, No cyanosis	  Neurologic: Non-focal  Extremities: Normal range of motion, No clubbing, cyanosis or edema      TELEMETRY: afib  	    ECG:  afib hr 113 bpm 	  RADIOLOGY:  < from: Xray Chest 2 Views PA/Lat (10.16.19 @ 10:56) >    Comparison: 2018    Findings: Thecardiac silhouette is normal in size. There are small   bilateral pleural effusions with associated atelectasis. The hilar and   mediastinal structures appear unremarkable. The visualized osseous   structures are intact.    Impression: Small bilateral pleural effusions with associated atelectasis.      < end of copied text >    OTHER: 	  	  LABS:	 	    CARDIAC MARKERS:  Troponin T, High Sensitivity Result: 76 ng/L (10-16 @ 10:41)                                  16.5   11.63 )-----------( 195      ( 16 Oct 2019 10:41 )             53.2     10-16    138  |  94<L>  |  48<H>  ----------------------------<  255<H>  4.8   |  29  |  1.86<H>    Ca    9.0      16 Oct 2019 10:41    TPro  7.4  /  Alb  3.3  /  TBili  0.5  /  DBili  x   /  AST  16  /  ALT  6<L>  /  AlkPhos  113  10-16    PT/INR - ( 16 Oct 2019 10:41 )   PT: 13.7 sec;   INR: 1.19 ratio         PTT - ( 16 Oct 2019 10:41 )  PTT:41.5 sec  proBNP:   Lipid Profile:   HgA1c:   TSH:
PULMONARY CONSULT    HPI: 63 y/o M with PMH of CAD s/p stents, HFrEF (EF 40% 12/2018), DMT2, HTN, CKD, NSTEMI, Afib (on Pradaxa) with acute onset of chest pain last night. Patient now states pain has migrated to R sided chest, better than prior, pleuritic in nature, improves with splinting, improved with leaning forward. Called to eval for bilateral R>L effusions. 97% on RA. Denies fever, chills, LE pain, palpitations, hemoptysis. +cough with clear phlegm.         PAST MEDICAL & SURGICAL HISTORY:  CAD (coronary artery disease)  MI (myocardial infarction): circa 2014  Atrial fibrillation  TIA (transient ischemic attack)  DM type 2 (diabetes mellitus, type 2)  HLD (hyperlipidemia)  HTN (hypertension), benign  S/P carotid endarterectomy: left  Status post angioplasty with stent: LULU x 3 2/7/2014    Allergies    No Known Allergies    Intolerances      FAMILY HISTORY:  Family history of heart disease    Social history: Never smoker     Review of Systems:  CONSTITUTIONAL: No fever, chills, or fatigue  EYES: No eye pain, visual disturbances, or discharge  ENMT:  No difficulty hearing, tinnitus, vertigo; No sinus or throat pain  NECK: No pain or stiffness  RESPIRATORY: Per above  CARDIOVASCULAR: No chest pain, palpitations, dizziness, or leg swelling  GASTROINTESTINAL: No abdominal or epigastric pain. No nausea, vomiting, or hematemesis; No diarrhea or constipation. No melena or hematochezia.  GENITOURINARY: No dysuria, frequency, hematuria, or incontinence  NEUROLOGICAL: No headaches, memory loss, loss of strength, numbness, or tremors  SKIN: No itching, burning, rashes, or lesions   MUSCULOSKELETAL: No joint pain or swelling; No muscle, back, or extremity pain  PSYCHIATRIC: No depression, anxiety, mood swings, or difficulty sleeping      Medications:  MEDICATIONS  (STANDING):  aspirin enteric coated 81 milliGRAM(s) Oral daily  buMETAnide Injectable 2 milliGRAM(s) IV Push once  colchicine 0.6 milliGRAM(s) Oral once  dabigatran 150 milliGRAM(s) Oral two times a day  dextrose 5%. 1000 milliLiter(s) (50 mL/Hr) IV Continuous <Continuous>  dextrose 50% Injectable 12.5 Gram(s) IV Push once  dextrose 50% Injectable 25 Gram(s) IV Push once  dextrose 50% Injectable 25 Gram(s) IV Push once  insulin glargine SubCutaneous Injection (LANTUS) - Peds 21 Unit(s) SubCutaneous at bedtime  insulin lispro (HumaLOG) corrective regimen sliding scale   SubCutaneous three times a day before meals  metolazone 5 milliGRAM(s) Oral <User Schedule>  metoprolol succinate  milliGRAM(s) Oral daily  pregabalin 100 milliGRAM(s) Oral two times a day    MEDICATIONS  (PRN):  dextrose 40% Gel 15 Gram(s) Oral once PRN Blood Glucose LESS THAN 70 milliGRAM(s)/deciliter  glucagon  Injectable 1 milliGRAM(s) IntraMuscular once PRN Glucose LESS THAN 70 milligrams/deciliter            Vital Signs Last 24 Hrs  T(C): 36.5 (16 Oct 2019 09:16), Max: 36.5 (16 Oct 2019 09:16)  T(F): 97.7 (16 Oct 2019 09:16), Max: 97.7 (16 Oct 2019 09:16)  HR: 90 (16 Oct 2019 14:12) (79 - 112)  BP: 151/66 (16 Oct 2019 14:12) (118/80 - 154/88)  BP(mean): --  RR: 20 (16 Oct 2019 14:12) (20 - 22)  SpO2: 96% (16 Oct 2019 14:12) (95% - 100%) on RA                LABS:                        16.5   11.63 )-----------( 195      ( 16 Oct 2019 10:41 )             53.2     10-16    136  |  97  |  43<H>  ----------------------------<  213<H>  3.9   |  27  |  1.68<H>    Ca    8.3<L>      16 Oct 2019 14:38    TPro  7.4  /  Alb  3.3  /  TBili  0.5  /  DBili  x   /  AST  16  /  ALT  6<L>  /  AlkPhos  113  10-16      CARDIAC MARKERS ( 16 Oct 2019 14:38 )  x     / x     / 80 U/L / x     / 4.0 ng/mL      CAPILLARY BLOOD GLUCOSE        PT/INR - ( 16 Oct 2019 10:41 )   PT: 13.7 sec;   INR: 1.19 ratio         PTT - ( 16 Oct 2019 10:41 )  PTT:41.5 sec      Serum Pro-Brain Natriuretic Peptide: 1683 pg/mL (10-16-19 @ 14:38)          Physical Examination:    General: No acute distress.      HEENT: Pupils equal, reactive to light.  Symmetric.    PULM: Decreased BS R base    CVS: irreg tachy    ABD: Soft, nondistended, nontender, normoactive bowel sounds, no masses    EXT: No edema, nontender    SKIN: Bilateral LE vascular changes, +2 edema  NEURO: Alert, oriented, interactive, nonfocal    RADIOLOGY REVIEWED  CXR: Bilateral pleural effusions R>L

## 2019-10-17 NOTE — PROGRESS NOTE ADULT - ASSESSMENT
64 year old male with Hypertension, Hyperlipidemia, Atrial Fibrillation, s/p DCCV, Coronary Artery Disease, NSTEMI, (2/2014), s/p PCI to mid LAD, Proximal LCx, Distal LCx (2/14, [facility], LULU), Congestive Heart Failure, Normal Ejection Fraction, Diabetes Mellitus, CVA, Severe Left Internal Carotid Disease, s/p CEA (2/2014) presenting with chest pain and sob     1. Atypical chest pain,  Coronary Artery Disease. S/P PCI to LAD, LCx.   pleuritic and reproducible , resolved   secondary to hydropneumothorax  noted on CT, r/o pericarditis   no evidence of ACS, D-dimer negative  recent gout flare up, pending echo to r/o pericarditis   leukocytosis noted ; work up per med  HS trop x 2elevated with nl CK, CKMB likely secondary to renal disease, known CAD   CT chest revealed Right-sided hydropneumothorax, RLL atelectasis , small left effusion with atelectasis    chest xray with sml bl pleural effusions    Continue ASA 81mg and statin    2. Acute on Chronic Congestive heart failure, Diastolic.   chest xray with small BL pleural effusion   s/p 2 mg bumex IVP x 1 on 10/16, creat improved   still with c/o orthopnea, change bumex to 2 mg IVP daily   c/w zaroxloyn 5mg 1 x weekly.  echo pending     3. Atrial Fibrillation.  mildly RVR noted, asymptomatic  c/w metoprolol succinate ER 100mg daily  Continue pradaxa 150mg BID    4. Right-sided hydropneumothorax  ct chest noted, Thorac eval noted no acute intervention for PTX  dvt ppx 64 year old male with Hypertension, Hyperlipidemia, Atrial Fibrillation, s/p DCCV, Coronary Artery Disease, NSTEMI, (2/2014), s/p PCI to mid LAD, Proximal LCx, Distal LCx (2/14, [facility], LULU), Congestive Heart Failure, Normal Ejection Fraction, Diabetes Mellitus, CVA, Severe Left Internal Carotid Disease, s/p CEA (2/2014) presenting with chest pain and sob     1. Atypical chest pain,  Coronary Artery Disease. S/P PCI to LAD, LCx.   pleuritic and reproducible , resolved   secondary to hydropneumothorax  noted on CT, r/o pericarditis   no evidence of ACS, D-dimer negative  recent gout flare up, pending echo to r/o pericarditis   leukocytosis noted ; work up per med, would check RVP  HS trop x 2elevated with nl CK, CKMB likely secondary to renal disease, known CAD   CT chest revealed Right-sided hydropneumothorax, RLL atelectasis , small left effusion with atelectasis    chest xray with sml bl pleural effusions    Continue ASA 81mg and statin    2. Acute on Chronic Congestive heart failure, Diastolic.   chest xray with small BL pleural effusion   s/p 2 mg bumex IVP x 1 on 10/16, creat improved   still with c/o orthopnea, change bumex to 2 mg IVP daily   c/w zaroxloyn 5mg 1 x weekly.  echo pending     3. Atrial Fibrillation.  mildly RVR noted, asymptomatic  c/w metoprolol succinate ER 100mg daily  Continue pradaxa 150mg BID    4. Right-sided hydropneumothorax  ct chest noted, Thorac eval noted no acute intervention for PTX  dvt ppx

## 2019-10-17 NOTE — PROGRESS NOTE ADULT - ASSESSMENT
64 year old male, , with Pmed hx sig for cad/dm/ htn / mi /ckd   with acute onset of chest pain and sob on presentation 2 weeks ago he had a severe gout attack. pt states 2 years ago had gained 60 lbs and was in alex with creatinine as high as upto > 5 mg/dl. it slowly started to recover and recently 1.9.       1- ckd III  2- microscopic hematuria in past as above  3- proteinuria/nephrotic syndrome   4- suspect hx of chf       creatinine seems baseline  check cr in am   pt with nephrotic syndrome and will require arb soon as well   cont metolazone and bumex 2mg qd

## 2019-10-17 NOTE — PROGRESS NOTE ADULT - SUBJECTIVE AND OBJECTIVE BOX
CARDIOLOGY FOLLOW UP - Dr. Delilah AU chest pain improved, sob improving   c.o orthopnea       PHYSICAL EXAM:  T(C): 36.7 (10-17-19 @ 12:41), Max: 36.9 (10-16-19 @ 22:55)  HR: 81 (10-17-19 @ 12:41) (81 - 121)  BP: 132/75 (10-17-19 @ 12:41) (128/81 - 157/76)  RR: 18 (10-17-19 @ 12:41) (18 - 20)  SpO2: 94% (10-17-19 @ 12:41) (92% - 96%)  Wt(kg): --  I&O's Summary    16 Oct 2019 07:01  -  17 Oct 2019 07:00  --------------------------------------------------------  IN: 450 mL / OUT: 1050 mL / NET: -600 mL        Appearance: Normal	  Cardiovascular: Normal S1 S2, irregular   Respiratory: diminished   Gastrointestinal:  Soft, Non-tender, + BS	  Extremities: + bl LE edema        MEDICATIONS  (STANDING):  aspirin enteric coated 81 milliGRAM(s) Oral daily  buMETAnide 2 milliGRAM(s) Oral daily  dabigatran 150 milliGRAM(s) Oral two times a day  dextrose 5%. 1000 milliLiter(s) (50 mL/Hr) IV Continuous <Continuous>  dextrose 50% Injectable 12.5 Gram(s) IV Push once  dextrose 50% Injectable 25 Gram(s) IV Push once  dextrose 50% Injectable 25 Gram(s) IV Push once  influenza   Vaccine 0.5 milliLiter(s) IntraMuscular once  insulin glargine Injectable (LANTUS) 21 Unit(s) SubCutaneous at bedtime  insulin lispro (HumaLOG) corrective regimen sliding scale   SubCutaneous three times a day before meals  insulin lispro (HumaLOG) corrective regimen sliding scale   SubCutaneous at bedtime  metolazone 5 milliGRAM(s) Oral <User Schedule>  metoprolol succinate  milliGRAM(s) Oral daily  pregabalin 100 milliGRAM(s) Oral two times a day      TELEMETRY: afib HR  	    ECG:  	  RADIOLOGY:   DIAGNOSTIC TESTING:  [ ] Echocardiogram:  [ ]  Catheterization:  [ ] Stress Test:    OTHER: 	  < from: CT Chest No Cont (10.16.19 @ 20:54) >    IMPRESSION:    Right-sided hydropneumothorax with small amount of pleural air component   and moderate fluid component. Adjacent rounded atelectasis is noted in   the right lower lobe. Small left effusion with adjacent atelectasis.    Findings were discussed with SUSAN Soriano by Dr. Ramirez at 920 pm on   10/16/19.     < end of copied text >    LABS:	 	    Creatine Kinase, Serum: 80 U/L [30 - 200] (10-16 @ 14:38)  CKMB Units: 4.0 ng/mL [0.0 - 6.7] (10-16 @ 14:38)  Troponin T, High Sensitivity Result: 68 ng/L [0 - 51] (10-16 @ 14:38)  Troponin T, High Sensitivity Result: 76 ng/L [0 - 51] (10-16 @ 10:41)                          16.9   11.25 )-----------( 190      ( 17 Oct 2019 07:43 )             56.1     10-16    136  |  97  |  43<H>  ----------------------------<  213<H>  3.9   |  27  |  1.68<H>    Ca    8.3<L>      16 Oct 2019 14:38    TPro  7.4  /  Alb  3.3  /  TBili  0.5  /  DBili  x   /  AST  16  /  ALT  6<L>  /  AlkPhos  113  10-16    PT/INR - ( 16 Oct 2019 10:41 )   PT: 13.7 sec;   INR: 1.19 ratio         PTT - ( 16 Oct 2019 10:41 )  PTT:41.5 sec

## 2019-10-17 NOTE — CONSULT NOTE ADULT - CONSULT REASON
R Effusion
chest pain/ sob   hx Hypertension, Hyperlipidemia, Atrial Fibrillation, s/p DCCV, Coronary Artery Disease, NSTEMI, (2/2014), s/p PCI to mid LAD, Proximal LCx, Distal LCx (2/14, [facility], LULU), Congestive Heart Failure, Normal Ejection Fraction, Diabetes Mellitus, CVA, Severe Left Internal Carotid Disease, s/p CEA (2/2014)
JOYCELYN PTX
abn creatinine

## 2019-10-17 NOTE — PROGRESS NOTE ADULT - SUBJECTIVE AND OBJECTIVE BOX
Follow-up Pulm Progress Note    No new respiratory events overnight.  Denies SOB/CP.   R pleuritic CP improved  New hydroPTX on CT   96% on RA    Medications:  MEDICATIONS  (STANDING):  aspirin enteric coated 81 milliGRAM(s) Oral daily  buMETAnide Injectable 2 milliGRAM(s) IV Push daily  dabigatran 150 milliGRAM(s) Oral two times a day  dextrose 5%. 1000 milliLiter(s) (50 mL/Hr) IV Continuous <Continuous>  dextrose 50% Injectable 12.5 Gram(s) IV Push once  dextrose 50% Injectable 25 Gram(s) IV Push once  dextrose 50% Injectable 25 Gram(s) IV Push once  influenza   Vaccine 0.5 milliLiter(s) IntraMuscular once  insulin glargine Injectable (LANTUS) 21 Unit(s) SubCutaneous at bedtime  insulin lispro (HumaLOG) corrective regimen sliding scale   SubCutaneous three times a day before meals  insulin lispro (HumaLOG) corrective regimen sliding scale   SubCutaneous at bedtime  metolazone 5 milliGRAM(s) Oral <User Schedule>  metoprolol succinate  milliGRAM(s) Oral daily  pregabalin 100 milliGRAM(s) Oral two times a day    MEDICATIONS  (PRN):  dextrose 40% Gel 15 Gram(s) Oral once PRN Blood Glucose LESS THAN 70 milliGRAM(s)/deciliter  glucagon  Injectable 1 milliGRAM(s) IntraMuscular once PRN Glucose LESS THAN 70 milligrams/deciliter          Vital Signs Last 24 Hrs  T(C): 36.7 (17 Oct 2019 12:41), Max: 36.9 (16 Oct 2019 22:55)  T(F): 98.1 (17 Oct 2019 12:41), Max: 98.5 (16 Oct 2019 22:55)  HR: 81 (17 Oct 2019 12:41) (81 - 121)  BP: 132/75 (17 Oct 2019 12:41) (128/81 - 157/76)  BP(mean): --  RR: 18 (17 Oct 2019 12:41) (18 - 20)  SpO2: 94% (17 Oct 2019 12:41) (92% - 95%) on RA          10-16 @ 07:01  -  10- @ 07:00  --------------------------------------------------------  IN: 450 mL / OUT: 1050 mL / NET: -600 mL          LABS:                        16.9   11.25 )-----------( 190      ( 17 Oct 2019 07:43 )             56.1     10-16    136  |  97  |  43<H>  ----------------------------<  213<H>  3.9   |  27  |  1.68<H>    Ca    8.3<L>      16 Oct 2019 14:38    TPro  7.4  /  Alb  3.3  /  TBili  0.5  /  DBili  x   /  AST  16  /  ALT  6<L>  /  AlkPhos  113  10-      CARDIAC MARKERS ( 16 Oct 2019 14:38 )  x     / x     / 80 U/L / x     / 4.0 ng/mL      CAPILLARY BLOOD GLUCOSE      POCT Blood Glucose.: 283 mg/dL (17 Oct 2019 13:01)    PT/INR - ( 16 Oct 2019 10:41 )   PT: 13.7 sec;   INR: 1.19 ratio         PTT - ( 16 Oct 2019 10:41 )  PTT:41.5 sec  Urinalysis Basic - ( 17 Oct 2019 04:40 )    Color: Light Yellow / Appearance: Clear / S.015 / pH: x  Gluc: x / Ketone: Negative  / Bili: Negative / Urobili: Negative   Blood: x / Protein: 300 mg/dL / Nitrite: Negative   Leuk Esterase: Negative / RBC: 8 /hpf / WBC 3 /HPF   Sq Epi: x / Non Sq Epi: 0 /hpf / Bacteria: Negative        Serum Pro-Brain Natriuretic Peptide: 1683 pg/mL (10-16-19 @ 14:38)            Physical Examination:  PULM: Diminished BS bases  CVS: S1, S2 heard    RADIOLOGY REVIEWED:   CT chest: < from: CT Chest No Cont (10.16.19 @ 20:54) >  Comparison: 2018    Tubes/Lines: None.    Mediastinum/Vessels/Heart: Aorta is normal in size. This mild enlargement   of the pulmonary artery segment suggestive of underlying pulmonary   hypertension. There isno pericardial effusion. No lymphadenopathy.   Thyroid gland is unremarkable    Lungs/Pleura/Airways: Moderate right pleural effusion noted with adjacent   area of rounded atelectasis. There is a small right pneumothorax. Small   left pleural effusion with minimal left basilar atelectasis.    Visualized abdomen: Unremarkable noncontrast appearance of the upper   abdomen    Bones and soft tissues: No suspicious osseous lesions. Degenerative   changes noted throughout the spine.    IMPRESSION:    Right-sided hydropneumothorax with small amount of pleural air component   and moderate fluid component. Adjacent rounded atelectasis is noted in   the right lower lobe. Small left effusion with adjacent atelectasis.    < end of copied text > Follow-up Pulm Progress Note    No new respiratory events overnight.  Denies SOB/CP.   R pleuritic CP improved  New hydroPTX on CT   96% on RA    Medications:  MEDICATIONS  (STANDING):  aspirin enteric coated 81 milliGRAM(s) Oral daily  buMETAnide Injectable 2 milliGRAM(s) IV Push daily  dabigatran 150 milliGRAM(s) Oral two times a day  dextrose 5%. 1000 milliLiter(s) (50 mL/Hr) IV Continuous <Continuous>  dextrose 50% Injectable 12.5 Gram(s) IV Push once  dextrose 50% Injectable 25 Gram(s) IV Push once  dextrose 50% Injectable 25 Gram(s) IV Push once  influenza   Vaccine 0.5 milliLiter(s) IntraMuscular once  insulin glargine Injectable (LANTUS) 21 Unit(s) SubCutaneous at bedtime  insulin lispro (HumaLOG) corrective regimen sliding scale   SubCutaneous three times a day before meals  insulin lispro (HumaLOG) corrective regimen sliding scale   SubCutaneous at bedtime  metolazone 5 milliGRAM(s) Oral <User Schedule>  metoprolol succinate  milliGRAM(s) Oral daily  pregabalin 100 milliGRAM(s) Oral two times a day    MEDICATIONS  (PRN):  dextrose 40% Gel 15 Gram(s) Oral once PRN Blood Glucose LESS THAN 70 milliGRAM(s)/deciliter  glucagon  Injectable 1 milliGRAM(s) IntraMuscular once PRN Glucose LESS THAN 70 milligrams/deciliter          Vital Signs Last 24 Hrs  T(C): 36.7 (17 Oct 2019 12:41), Max: 36.9 (16 Oct 2019 22:55)  T(F): 98.1 (17 Oct 2019 12:41), Max: 98.5 (16 Oct 2019 22:55)  HR: 81 (17 Oct 2019 12:41) (81 - 121)  BP: 132/75 (17 Oct 2019 12:41) (128/81 - 157/76)  BP(mean): --  RR: 18 (17 Oct 2019 12:41) (18 - 20)  SpO2: 94% (17 Oct 2019 12:41) (92% - 95%) on RA          10-16 @ 07:01  -  10- @ 07:00  --------------------------------------------------------  IN: 450 mL / OUT: 1050 mL / NET: -600 mL          LABS:                        16.9   11.25 )-----------( 190      ( 17 Oct 2019 07:43 )             56.1     10-16    136  |  97  |  43<H>  ----------------------------<  213<H>  3.9   |  27  |  1.68<H>    Ca    8.3<L>      16 Oct 2019 14:38    TPro  7.4  /  Alb  3.3  /  TBili  0.5  /  DBili  x   /  AST  16  /  ALT  6<L>  /  AlkPhos  113  10-      CARDIAC MARKERS ( 16 Oct 2019 14:38 )  x     / x     / 80 U/L / x     / 4.0 ng/mL      CAPILLARY BLOOD GLUCOSE      POCT Blood Glucose.: 283 mg/dL (17 Oct 2019 13:01)    PT/INR - ( 16 Oct 2019 10:41 )   PT: 13.7 sec;   INR: 1.19 ratio         PTT - ( 16 Oct 2019 10:41 )  PTT:41.5 sec  Urinalysis Basic - ( 17 Oct 2019 04:40 )    Color: Light Yellow / Appearance: Clear / S.015 / pH: x  Gluc: x / Ketone: Negative  / Bili: Negative / Urobili: Negative   Blood: x / Protein: 300 mg/dL / Nitrite: Negative   Leuk Esterase: Negative / RBC: 8 /hpf / WBC 3 /HPF   Sq Epi: x / Non Sq Epi: 0 /hpf / Bacteria: Negative        Serum Pro-Brain Natriuretic Peptide: 1683 pg/mL (10-16-19 @ 14:38)            Physical Examination:  PULM: Diminished BS bases  CVS: rrr    RADIOLOGY REVIEWED:   CT chest: < from: CT Chest No Cont (10.16.19 @ 20:54) >  Comparison: 2018    Tubes/Lines: None.    Mediastinum/Vessels/Heart: Aorta is normal in size. This mild enlargement   of the pulmonary artery segment suggestive of underlying pulmonary   hypertension. There isno pericardial effusion. No lymphadenopathy.   Thyroid gland is unremarkable    Lungs/Pleura/Airways: Moderate right pleural effusion noted with adjacent   area of rounded atelectasis. There is a small right pneumothorax. Small   left pleural effusion with minimal left basilar atelectasis.    Visualized abdomen: Unremarkable noncontrast appearance of the upper   abdomen    Bones and soft tissues: No suspicious osseous lesions. Degenerative   changes noted throughout the spine.    IMPRESSION:    Right-sided hydropneumothorax with small amount of pleural air component   and moderate fluid component. Adjacent rounded atelectasis is noted in   the right lower lobe. Small left effusion with adjacent atelectasis.    < end of copied text >

## 2019-10-17 NOTE — PROGRESS NOTE ADULT - ASSESSMENT
65 y/o M with PMH of CAD s/p stents, HFrEF (EF 40% 12/2018), DMT2, HTN, CKD, NSTEMI, Afib (on Pradaxa) with acute onset of chest pain last night. Patient now states pain has migrated to R sided chest, better than prior, pleuritic in nature, improves with splinting, improved with leaning forward. Called to eval for bilateral R>L effusions. CT chest reveals new R hydroPTX

## 2019-10-17 NOTE — PROGRESS NOTE ADULT - SUBJECTIVE AND OBJECTIVE BOX
[facility]   [phone]  RENAL FOLLOW UP NOTE  --------------------------------------------------------------------------------  Chief Complaint:    24 hour events/subjective:    seen earlier. states had sob o/n when supine resolved   above events of PTX noted. pulm team at bedside     PAST HISTORY  --------------------------------------------------------------------------------  No significant changes to PMH, PSH, FHx, SHx, unless otherwise noted    ALLERGIES & MEDICATIONS  --------------------------------------------------------------------------------  Allergies    No Known Allergies    Intolerances      Standing Inpatient Medications  aspirin enteric coated 81 milliGRAM(s) Oral daily  buMETAnide Injectable 2 milliGRAM(s) IV Push daily  dabigatran 150 milliGRAM(s) Oral two times a day  dextrose 5%. 1000 milliLiter(s) IV Continuous <Continuous>  dextrose 50% Injectable 12.5 Gram(s) IV Push once  dextrose 50% Injectable 25 Gram(s) IV Push once  dextrose 50% Injectable 25 Gram(s) IV Push once  influenza   Vaccine 0.5 milliLiter(s) IntraMuscular once  insulin glargine Injectable (LANTUS) 21 Unit(s) SubCutaneous at bedtime  insulin lispro (HumaLOG) corrective regimen sliding scale   SubCutaneous three times a day before meals  insulin lispro (HumaLOG) corrective regimen sliding scale   SubCutaneous at bedtime  metolazone 5 milliGRAM(s) Oral <User Schedule>  metoprolol succinate  milliGRAM(s) Oral daily  pregabalin 100 milliGRAM(s) Oral two times a day    PRN Inpatient Medications  dextrose 40% Gel 15 Gram(s) Oral once PRN  glucagon  Injectable 1 milliGRAM(s) IntraMuscular once PRN      REVIEW OF SYSTEMS  --------------------------------------------------------------------------------    Gen: denies  fevers/chills,  CVS: denies chest pain/palpitations  Resp: denies SOB/Cough  GI: Denies N/V/Abd pain  : Denies dysuria/oliguria/hematuria    All other systems were reviewed and are negative, except as noted.    VITALS/PHYSICAL EXAM  --------------------------------------------------------------------------------  T(C): 37.3 (10-17-19 @ 20:22), Max: 37.3 (10-17-19 @ 20:22)  HR: 101 (10-17-19 @ 20:22) (81 - 121)  BP: 126/99 (10-17-19 @ 20:22) (126/99 - 157/76)  RR: 18 (10-17-19 @ 20:22) (18 - 20)  SpO2: 92% (10-17-19 @ 20:22) (92% - 94%)  Wt(kg): --  Height (cm): 181.6 (10-16-19 @ 16:26)  Weight (kg): 139 (10-16-19 @ 16:26)  BMI (kg/m2): 42.1 (10-16-19 @ 16:26)  BSA (m2): 2.54 (10-16-19 @ 16:26)      10-16-19 @ 07:01  -  10-17-19 @ 07:00  --------------------------------------------------------  IN: 450 mL / OUT: 1050 mL / NET: -600 mL    10-17-19 @ 07:01  -  10-17-19 @ 22:32  --------------------------------------------------------  IN: 0 mL / OUT: 300 mL / NET: -300 mL      Physical Exam:  	  Gen: Non toxic comfortable appearing   	no jvd ,  	Pulm: decrease bs  no rales or ronchi or wheezing  	CV: RRR, S1S2; no rub  	Abd: +BS, soft, nontender/nondistended  	: No suprapubic tenderness  	UE: Warm, no cyanosis  no clubbing,  no edema  	LE: Warm, no cyanosis  no clubbing, no edema  	Neuro: alert and oriented. speech coherent   	Skin: Warm, no decrease skin turgor but hyperpigmented LE   		        LABS/STUDIES  --------------------------------------------------------------------------------              16.9   11.25 >-----------<  190      [10-17-19 @ 07:43]              56.1     136  |  97  |  43  ----------------------------<  213      [10-16-19 @ 14:38]  3.9   |  27  |  1.68        Ca     8.3     [10-16-19 @ 14:38]    TPro  7.4  /  Alb  3.3  /  TBili  0.5  /  DBili  x   /  AST  16  /  ALT  6   /  AlkPhos  113  [10-16-19 @ 10:41]    PT/INR: PT 13.7 , INR 1.19       [10-16-19 @ 10:41]  PTT: 41.5       [10-16-19 @ 10:41]    CK 80      [10-16-19 @ 14:38]    Creatinine Trend:  SCr 1.68 [10-16 @ 14:38]  SCr 1.86 [10-16 @ 10:41]              Urinalysis - [10-17-19 @ 04:40]      Color Light Yellow / Appearance Clear / SG 1.015 / pH 6.5      Gluc 500 mg/dL / Ketone Negative  / Bili Negative / Urobili Negative       Blood Small / Protein 300 mg/dL / Leuk Est Negative / Nitrite Negative      RBC 8 / WBC 3 / Hyaline 0 / Gran  / Sq Epi  / Non Sq Epi 0 / Bacteria Negative    Urine Creatinine 89      [10-17-19 @ 04:42]  Urine Protein 370      [10-17-19 @ 04:42]    HbA1c 10.3      [10-17-19 @ 07:43]  TSH 1.71      [12-28-18 @ 14:58]

## 2019-10-18 LAB
ALBUMIN SERPL ELPH-MCNC: 3 G/DL — LOW (ref 3.3–5)
ALP SERPL-CCNC: 106 U/L — SIGNIFICANT CHANGE UP (ref 40–120)
ALT FLD-CCNC: 6 U/L — LOW (ref 10–45)
ANION GAP SERPL CALC-SCNC: 16 MMOL/L — SIGNIFICANT CHANGE UP (ref 5–17)
AST SERPL-CCNC: 11 U/L — SIGNIFICANT CHANGE UP (ref 10–40)
BILIRUB SERPL-MCNC: 0.6 MG/DL — SIGNIFICANT CHANGE UP (ref 0.2–1.2)
BUN SERPL-MCNC: 50 MG/DL — HIGH (ref 7–23)
CALCIUM SERPL-MCNC: 9.3 MG/DL — SIGNIFICANT CHANGE UP (ref 8.4–10.5)
CHLORIDE SERPL-SCNC: 92 MMOL/L — LOW (ref 96–108)
CO2 SERPL-SCNC: 28 MMOL/L — SIGNIFICANT CHANGE UP (ref 22–31)
CREAT SERPL-MCNC: 1.94 MG/DL — HIGH (ref 0.5–1.3)
GLUCOSE BLDC GLUCOMTR-MCNC: 205 MG/DL — HIGH (ref 70–99)
GLUCOSE BLDC GLUCOMTR-MCNC: 326 MG/DL — HIGH (ref 70–99)
GLUCOSE BLDC GLUCOMTR-MCNC: 358 MG/DL — HIGH (ref 70–99)
GLUCOSE BLDC GLUCOMTR-MCNC: 373 MG/DL — HIGH (ref 70–99)
GLUCOSE SERPL-MCNC: 221 MG/DL — HIGH (ref 70–99)
HCT VFR BLD CALC: 51.5 % — HIGH (ref 39–50)
HGB BLD-MCNC: 16.2 G/DL — SIGNIFICANT CHANGE UP (ref 13–17)
MCHC RBC-ENTMCNC: 25.3 PG — LOW (ref 27–34)
MCHC RBC-ENTMCNC: 31.5 GM/DL — LOW (ref 32–36)
MCV RBC AUTO: 80.3 FL — SIGNIFICANT CHANGE UP (ref 80–100)
NRBC # BLD: 0 /100 WBCS — SIGNIFICANT CHANGE UP (ref 0–0)
PLATELET # BLD AUTO: 169 K/UL — SIGNIFICANT CHANGE UP (ref 150–400)
POTASSIUM SERPL-MCNC: 3.6 MMOL/L — SIGNIFICANT CHANGE UP (ref 3.5–5.3)
POTASSIUM SERPL-SCNC: 3.6 MMOL/L — SIGNIFICANT CHANGE UP (ref 3.5–5.3)
PROT SERPL-MCNC: 7.1 G/DL — SIGNIFICANT CHANGE UP (ref 6–8.3)
RBC # BLD: 6.41 M/UL — HIGH (ref 4.2–5.8)
RBC # FLD: 16.1 % — HIGH (ref 10.3–14.5)
SODIUM SERPL-SCNC: 136 MMOL/L — SIGNIFICANT CHANGE UP (ref 135–145)
WBC # BLD: 9.86 K/UL — SIGNIFICANT CHANGE UP (ref 3.8–10.5)
WBC # FLD AUTO: 9.86 K/UL — SIGNIFICANT CHANGE UP (ref 3.8–10.5)

## 2019-10-18 RX ORDER — SOD,AMMONIUM,POTASSIUM LACTATE
1 CREAM (GRAM) TOPICAL
Refills: 0 | Status: DISCONTINUED | OUTPATIENT
Start: 2019-10-18 | End: 2019-10-19

## 2019-10-18 RX ORDER — BUMETANIDE 0.25 MG/ML
2 INJECTION INTRAMUSCULAR; INTRAVENOUS DAILY
Refills: 0 | Status: DISCONTINUED | OUTPATIENT
Start: 2019-10-19 | End: 2019-10-19

## 2019-10-18 RX ADMIN — Medication 81 MILLIGRAM(S): at 08:48

## 2019-10-18 RX ADMIN — DABIGATRAN ETEXILATE MESYLATE 150 MILLIGRAM(S): 150 CAPSULE ORAL at 05:19

## 2019-10-18 RX ADMIN — DABIGATRAN ETEXILATE MESYLATE 150 MILLIGRAM(S): 150 CAPSULE ORAL at 17:38

## 2019-10-18 RX ADMIN — Medication 100 MILLIGRAM(S): at 05:19

## 2019-10-18 RX ADMIN — Medication 10: at 17:39

## 2019-10-18 RX ADMIN — Medication 4: at 09:05

## 2019-10-18 RX ADMIN — Medication 10: at 13:03

## 2019-10-18 RX ADMIN — BUMETANIDE 2 MILLIGRAM(S): 0.25 INJECTION INTRAMUSCULAR; INTRAVENOUS at 05:20

## 2019-10-18 RX ADMIN — Medication 2: at 21:47

## 2019-10-18 RX ADMIN — Medication 100 MILLIGRAM(S): at 17:38

## 2019-10-18 RX ADMIN — INSULIN GLARGINE 21 UNIT(S): 100 INJECTION, SOLUTION SUBCUTANEOUS at 21:47

## 2019-10-18 NOTE — PROGRESS NOTE ADULT - ATTENDING COMMENTS
Patient seen and examined.  Agree with above NP note.  cv stable  resolved dyspnea, chest pain  echo with nl lv fxn, no pericardial effusion  patient refusing CT placement for therapeutic and diagnostic purposes  feels better and wants to follow up as outpt with pulmonary   cont oral bumex  med/pulmo f/u  discussed with patient need to r/o potential malignancy as cause of unilateral effusion  hew understands but still does not want any invasive procedure as he feels better and back to baseline  cont current meds  d/c plan per primary team
Patient seen and examined.  Agree with above NP note.  cv stable with resolved chest pain  improved dyspnea  sx related to hydropnx seen on ct ? pleurisy ? pericarditis   ct findings noted  thoracic eval noted  pulmonary rec possible CT placement for diagnostic and therap purposes  patient reluctant to proceed  cont diuresis, give add bumex ivp today   f/u echo   no pericardial effusion on ct
Pt refuses drainage or sampling of rt pleural effusion at this point. I explained my concern with ruling out possible malignancy, possibility of even 'trapped lung'.  He understood and stated he will fu in 2-3 weeks after his sons wedding, for outpt thoracentesis.
Pt currently not agreeable to chest drainage at this point. My concern is for possible unseen underlying malignancy,  Unclear why this small ptx has occurred.

## 2019-10-18 NOTE — PROGRESS NOTE ADULT - SUBJECTIVE AND OBJECTIVE BOX
CHIEF COMPLAINT:Patient is a 64y old  Male who presents with a chief complaint of   	        PAST MEDICAL & SURGICAL HISTORY:  CAD (coronary artery disease)  MI (myocardial infarction): circa 2014  Atrial fibrillation  TIA (transient ischemic attack)  DM type 2 (diabetes mellitus, type 2)  HLD (hyperlipidemia)  HTN (hypertension), benign  S/P carotid endarterectomy: left  Status post angioplasty with stent: LULU x 3 2/7/2014          REVIEW OF SYSTEMS:  feels better   EYES: No eye pain, visual disturbances, or discharge  NECK: No pain or stiffness  RESPIRATORY: dec sob / cough   CARDIOVASCULAR: No chest pain, palpitations, passing out, dizziness,  GASTROINTESTINAL: No abdominal or epigastric pain. No nausea, vomiting, or hematemesis; No diarrhea or constipation.   GENITOURINARY: No dysuria, frequency, hematuria, or incontinence  NEUROLOGICAL: No headaches, memory loss, loss of strength, numbness, or tremors  MUSCULOSKELETAL: No joint pain or swelling; No muscle, back, or extremity pain    Medications:  MEDICATIONS  (STANDING):  aspirin enteric coated 81 milliGRAM(s) Oral daily  buMETAnide Injectable 2 milliGRAM(s) IV Push daily  dabigatran 150 milliGRAM(s) Oral two times a day  dextrose 5%. 1000 milliLiter(s) (50 mL/Hr) IV Continuous <Continuous>  dextrose 50% Injectable 12.5 Gram(s) IV Push once  dextrose 50% Injectable 25 Gram(s) IV Push once  dextrose 50% Injectable 25 Gram(s) IV Push once  influenza   Vaccine 0.5 milliLiter(s) IntraMuscular once  insulin glargine Injectable (LANTUS) 21 Unit(s) SubCutaneous at bedtime  insulin lispro (HumaLOG) corrective regimen sliding scale   SubCutaneous three times a day before meals  insulin lispro (HumaLOG) corrective regimen sliding scale   SubCutaneous at bedtime  metolazone 5 milliGRAM(s) Oral <User Schedule>  metoprolol succinate  milliGRAM(s) Oral daily  pregabalin 100 milliGRAM(s) Oral two times a day    MEDICATIONS  (PRN):  dextrose 40% Gel 15 Gram(s) Oral once PRN Blood Glucose LESS THAN 70 milliGRAM(s)/deciliter  glucagon  Injectable 1 milliGRAM(s) IntraMuscular once PRN Glucose LESS THAN 70 milligrams/deciliter    	    PHYSICAL EXAM:  T(C): 36.7 (10-18-19 @ 04:43), Max: 37.3 (10-17-19 @ 20:22)  HR: 91 (10-18-19 @ 04:43) (81 - 101)  BP: 151/94 (10-18-19 @ 04:43) (126/99 - 151/94)  RR: 19 (10-18-19 @ 04:43) (18 - 19)  SpO2: 97% (10-18-19 @ 04:43) (92% - 97%)  Wt(kg): --  I&O's Summary    17 Oct 2019 07:01  -  18 Oct 2019 07:00  --------------------------------------------------------  IN: 0 mL / OUT: 1950 mL / NET: -1950 mL        Appearance: Normal	  HEENT:   Normal oral mucosa, PERRL, EOMI	  Lymphatic: No lymphadenopathy  Cardiovascular: irreg  Respiratory: dec bs   Psychiatry: A & O x 3, Mood & affect appropriate  Gastrointestinal:  Soft, Non-tender, + BS	  Skin: No rashes, No ecchymoses, No cyanosis	  Neurologic: Non-focal  Extremities: pvd  edema better      TELEMETRY: 	    ECG:  	  RADIOLOGY:  OTHER: 	  	  LABS:	 	    CARDIAC MARKERS:  CARDIAC MARKERS ( 16 Oct 2019 14:38 )  x     / x     / 80 U/L / x     / 4.0 ng/mL                                16.2   9.86  )-----------( 169      ( 18 Oct 2019 07:02 )             51.5     10-18    136  |  92<L>  |  50<H>  ----------------------------<  221<H>  3.6   |  28  |  1.94<H>    Ca    9.3      18 Oct 2019 07:01    TPro  7.1  /  Alb  3.0<L>  /  TBili  0.6  /  DBili  x   /  AST  11  /  ALT  6<L>  /  AlkPhos  106  10-18    proBNP:   Lipid Profile:   HgA1c:   TSH:

## 2019-10-18 NOTE — DIETITIAN INITIAL EVALUATION ADULT. - OTHER INFO
Reason for admission : acute onset of chest pain and sob   Diet PTA : healthy diet, snacks on fruit and nuts. he prepares his own meals.   Intake : >75%  Denies nausea/vomit :  Denies difficulty chewing /swallow :  Last BM : last night  NKFA  IBW +/- 10%= 175pounds  Ht: 71.5"  Usual Weight PTA: 292pounds  BMI: 42.1  Education Provided : Weight Loss Tips, CHO counting, diabetes label reading tips   skin: no pressure injury  edema: +2 left ankle, right ankle

## 2019-10-18 NOTE — PROGRESS NOTE ADULT - PROBLEM SELECTOR PLAN 1
Small R hydroPTX of unclear etiology  -No evidence of trauma or rib fractures, no recent procedures. PTX is new since 12/2018  -Suggest placement of R chest tube for drainage of effusion and sampling of fluid and analysis  -Patient is currently refusing, he understands the reason for pleural fluid cyto is to r/o underlying malignancy   -He states he will follow up for diagnostic/therapuetic thoracentesis as outpt Small R hydroPTX of unclear etiology  -No evidence of trauma or rib fractures, no recent procedures. PTX is new since 12/2018  -Suggest drainage of effusion and sampling of fluid and analysis  -Patient is currently refusing, he understands the reason for pleural fluid cyto is to r/o underlying malignancy   -He states he will follow up for diagnostic/therapuetic thoracentesis as outpt

## 2019-10-18 NOTE — PROGRESS NOTE ADULT - PROBLEM SELECTOR PLAN 2
Moderate R pleural effusion  -Suggest drainage of R effusion and to obtain cytology of pleural fluid to r/o malignancy   -
Moderate R pleural effusion  -Suggest drainage of R effusion and to obtain cytology of pleural fluid to r/o malignancy

## 2019-10-18 NOTE — DIETITIAN INITIAL EVALUATION ADULT. - PERTINENT MEDS FT
aspirin enteric coated 81  dabigatran 150  dextrose 40% Gel 15 PRN  dextrose 5%. 1000  dextrose 50% Injectable 12.5  dextrose 50% Injectable 25  dextrose 50% Injectable 25  glucagon  Injectable 1 PRN  influenza   Vaccine 0.5  insulin glargine Injectable (LANTUS) 21  insulin lispro (HumaLOG) corrective regimen sliding scale   insulin lispro (HumaLOG) corrective regimen sliding scale   metolazone 5  metoprolol succinate   pregabalin 100

## 2019-10-18 NOTE — DIETITIAN INITIAL EVALUATION ADULT. - PERTINENT LABORATORY DATA
Na 136, K+ 3.6, BUN 50, Cr 1.94, , AST 11, ALT 6    CAPILLARY BLOOD GLUCOSE  POCT Blood Glucose.: 205 mg/dL (18 Oct 2019 08:40)  POCT Blood Glucose.: 365 mg/dL (17 Oct 2019 21:34)  POCT Blood Glucose.: 253 mg/dL (17 Oct 2019 17:45)  POCT Blood Glucose.: 283 mg/dL (17 Oct 2019 13:01)    Hemoglobin A1C, Whole Blood: 10.3 % (10-17 @ 07:43)

## 2019-10-18 NOTE — PROGRESS NOTE ADULT - ASSESSMENT
64 year old male with Hypertension, Hyperlipidemia, Atrial Fibrillation, s/p DCCV, Coronary Artery Disease, NSTEMI, (2/2014), s/p PCI to mid LAD, Proximal LCx, Distal LCx (2/14, [facility], LULU), Congestive Heart Failure, Normal Ejection Fraction, Diabetes Mellitus, CVA, Severe Left Internal Carotid Disease, s/p CEA (2/2014) presenting with chest pain and sob     1. Atypical chest pain,  Coronary Artery Disease. S/P PCI to LAD, LCx.   pleuritic and reproducible , resolved   secondary to hydropneumothorax  noted on CT  echo with no evidence of pericarditis or effusion, mod LAE, grossly borderline LV sys dysfx EF 45% (reported LVEF in last study is  slightly lower, but atrial fibrillation makes it difficult), no wall motion abnl  to assess ejection fraction).  no evidence of ACS, D-dimer negative  leukocytosis resolved; RVP neg  HS trop x 2elevated with nl CK, CKMB likely secondary to renal disease, known CAD   CT chest revealed Right-sided hydropneumothorax, RLL atelectasis , small left effusion with atelectasis    chest xray with sml bl pleural effusions    Continue ASA 81mg and statin    2. Acute on Chronic Congestive heart failure, Diastolic.   chest xray with small BL pleural effusion   orthopnea, resolved, change bumex to 2 mg PO daily   c/w zaroxloyn 5mg 1 x weekly.    3. Atrial Fibrillation.  mildly RVR noted, asymptomatic  c/w metoprolol succinate ER 100mg daily  Continue pradaxa 150mg BID    4. Right-sided hydropneumothorax  ct chest noted, Thorac eval noted no acute intervention for PTX  pt refusing chest tube/ drainage   repeat imaging per Pulm     no further workup for cv standpoint     dvt ppx 64 year old male with Hypertension, Hyperlipidemia, Atrial Fibrillation, s/p DCCV, Coronary Artery Disease, NSTEMI, (2/2014), s/p PCI to mid LAD, Proximal LCx, Distal LCx (2/14, [facility], L[facility]), Congestive Heart Failure, Normal Ejection Fraction, Diabetes Mellitus, CVA, Severe Left Internal Carotid Disease, s/p CEA (2/2014) presenting with chest pain and sob     1. Atypical chest pain, Coronary Artery Disease. S/P PCI to LAD, LCx.   pleuritic and reproducible, resolved, and secondary to hydropneumothorax  noted on CT  echo with no evidence of pericardial effusion, mod LAE, grossly borderline LV sys dysfx EF 45% (reported LVEF in last study is  slightly lower, but atrial fibrillation makes it difficult), no wall motion abnl  to assess ejection fraction).  no evidence of ACS, D-dimer negative  leukocytosis resolved; RVP neg  HS trop x 2elevated with nl CK, CKMB likely secondary to renal disease, known CAD   CT chest revealed Right-sided hydropneumothorax, RLL atelectasis , small left effusion with atelectasis    chest xray with sml bl pleural effusions    Continue ASA 81mg and statin    2. Acute on Chronic Congestive heart failure, Diastolic.   chest xray with small BL pleural effusion   orthopnea, resolved, change bumex to 2 mg PO daily   c/w zaroxloyn 5mg 1 x weekly.    3. Atrial Fibrillation.  mildly RVR noted, asymptomatic  c/w metoprolol succinate ER 100mg daily  Continue pradaxa 150mg BID    4. Right-sided hydropneumothorax  ct chest noted, Thorac eval noted no acute intervention for PTX  pt refusing chest tube/ drainage   repeat imaging per Pulm     no further workup for cv standpoint     dvt ppx

## 2019-10-18 NOTE — PROVIDER CONTACT NOTE (OTHER) - ASSESSMENT
Pt's  for one second while standing up to urinate. Vitals T 98.8, HR 98, B/P 144/91 RR 19, 96% NC 2L,

## 2019-10-18 NOTE — PROGRESS NOTE ADULT - SUBJECTIVE AND OBJECTIVE BOX
Follow-up Pulm Progress Note    No new respiratory events overnight.  Denies SOB/CP.   R CP resolved  92% ON ra    Medications:  MEDICATIONS  (STANDING):  aspirin enteric coated 81 milliGRAM(s) Oral daily  dabigatran 150 milliGRAM(s) Oral two times a day  dextrose 5%. 1000 milliLiter(s) (50 mL/Hr) IV Continuous <Continuous>  dextrose 50% Injectable 12.5 Gram(s) IV Push once  dextrose 50% Injectable 25 Gram(s) IV Push once  dextrose 50% Injectable 25 Gram(s) IV Push once  influenza   Vaccine 0.5 milliLiter(s) IntraMuscular once  insulin glargine Injectable (LANTUS) 21 Unit(s) SubCutaneous at bedtime  insulin lispro (HumaLOG) corrective regimen sliding scale   SubCutaneous three times a day before meals  insulin lispro (HumaLOG) corrective regimen sliding scale   SubCutaneous at bedtime  metolazone 5 milliGRAM(s) Oral <User Schedule>  metoprolol succinate  milliGRAM(s) Oral daily  pregabalin 100 milliGRAM(s) Oral two times a day    MEDICATIONS  (PRN):  dextrose 40% Gel 15 Gram(s) Oral once PRN Blood Glucose LESS THAN 70 milliGRAM(s)/deciliter  glucagon  Injectable 1 milliGRAM(s) IntraMuscular once PRN Glucose LESS THAN 70 milligrams/deciliter          Vital Signs Last 24 Hrs  T(C): 36.7 (18 Oct 2019 04:43), Max: 37.3 (17 Oct 2019 20:22)  T(F): 98.1 (18 Oct 2019 04:43), Max: 99.2 (17 Oct 2019 20:22)  HR: 91 (18 Oct 2019 04:43) (81 - 101)  BP: 151/94 (18 Oct 2019 04:43) (126/99 - 151/94)  BP(mean): --  RR: 19 (18 Oct 2019 04:43) (18 - 19)  SpO2: 97% (18 Oct 2019 04:43) (92% - 97%) on RA          10-17 @ 07:01  -  10-18 @ 07:00  --------------------------------------------------------  IN: 0 mL / OUT: 1950 mL / NET: -1950 mL          LABS:                        16.2   9.86  )-----------( 169      ( 18 Oct 2019 07:02 )             51.5     10-18    136  |  92<L>  |  50<H>  ----------------------------<  221<H>  3.6   |  28  |  1.94<H>    Ca    9.3      18 Oct 2019 07:01    TPro  7.1  /  Alb  3.0<L>  /  TBili  0.6  /  DBili  x   /  AST  11  /  ALT  6<L>  /  AlkPhos  106  10-18      CARDIAC MARKERS ( 16 Oct 2019 14:38 )  x     / x     / 80 U/L / x     / 4.0 ng/mL      CAPILLARY BLOOD GLUCOSE      POCT Blood Glucose.: 205 mg/dL (18 Oct 2019 08:40)      Urinalysis Basic - ( 17 Oct 2019 04:40 )    Color: Light Yellow / Appearance: Clear / S.015 / pH: x  Gluc: x / Ketone: Negative  / Bili: Negative / Urobili: Negative   Blood: x / Protein: 300 mg/dL / Nitrite: Negative   Leuk Esterase: Negative / RBC: 8 /hpf / WBC 3 /HPF   Sq Epi: x / Non Sq Epi: 0 /hpf / Bacteria: Negative        Serum Pro-Brain Natriuretic Peptide: 1683 pg/mL (10-16-19 @ 14:38)        Physical Examination:  PULM: Diminished BS bilaterally   CVS: irreg    RADIOLOGY REVIEWED  CT chest: < from: CT Chest No Cont (10.16.19 @ 20:54) >  INTERPRETATION:  Reason for Exam:  Dyspnea. Evaluate pleural effusion    CT of the chest was performed from the thoracic inlet to the level of the   adrenal glands without contrast injection.    Comparison: 2018    Tubes/Lines: None.    Mediastinum/Vessels/Heart: Aorta is normal in size. This mild enlargement   of the pulmonary artery segment suggestive of underlying pulmonary   hypertension. There isno pericardial effusion. No lymphadenopathy.   Thyroid gland is unremarkable    Lungs/Pleura/Airways: Moderate right pleural effusion noted with adjacent   area of rounded atelectasis. There is a small right pneumothorax. Small   left pleural effusion with minimal left basilar atelectasis.    Visualized abdomen: Unremarkable noncontrast appearance of the upper   abdomen    Bones and soft tissues: No suspicious osseous lesions. Degenerative   changes noted throughout the spine.    IMPRESSION:    Right-sided hydropneumothorax with small amount of pleural air component   and moderate fluid component. Adjacent rounded atelectasis is noted in   the right lower lobe. Small left effusion with adjacent atelectasis.

## 2019-10-18 NOTE — PROGRESS NOTE ADULT - ASSESSMENT
pt w/ hx cad / mi/dm/htn/a fib / w/ [facility]p   smallptx/pl effusion  pulm f/u noted  ct recommended   pt currently refused/ will speak w/ pulm / cards   pericarditis likely  echo notd  renal eval noted    tele  c/w outp meds  dm  fsg riss  diurese as able

## 2019-10-18 NOTE — CHART NOTE - NSCHARTNOTEFT_GEN_A_CORE
Upon Nutritional Assessment by the Registered Dietitian your patient was determined to meet criteria / has evidence of the following diagnosis/diagnoses:          [ ]  Mild Protein Calorie Malnutrition        [ ]  Moderate Protein Calorie Malnutrition        [ ] Severe Protein Calorie Malnutrition        [ ] Unspecified Protein Calorie Malnutrition        [ ] Underweight / BMI <19        [x ] Morbid Obesity / BMI > 40      Findings as based on:  [x ] Comprehensive nutrition assessment (BMI 42.1)  [ ] Nutrition Focused Physical Exam  [ ] Other:       Nutrition Plan/Recommendations:    change diet to consistent carbohydrate with snack, 60gram protein, dash. RD provided education on diabetes/CKD, Weight Loss Tips.       PROVIDER Section:     By signing this assessment you are acknowledging and agree with the diagnosis/diagnoses assigned by the Registered Dietitian    Comments:

## 2019-10-18 NOTE — DIETITIAN INITIAL EVALUATION ADULT. - ADD RECOMMEND
change diet to consistent carbohydrate with snack, 60gram protein, dash. RD provided education on diabetes/CKD, Weight Loss Tips. placed sticker for BMI >40.

## 2019-10-19 ENCOUNTER — TRANSCRIPTION ENCOUNTER (OUTPATIENT)
Age: 64
End: 2019-10-19

## 2019-10-19 VITALS
DIASTOLIC BLOOD PRESSURE: 64 MMHG | HEART RATE: 90 BPM | TEMPERATURE: 98 F | RESPIRATION RATE: 18 BRPM | OXYGEN SATURATION: 96 % | SYSTOLIC BLOOD PRESSURE: 107 MMHG

## 2019-10-19 DIAGNOSIS — I48.91 UNSPECIFIED ATRIAL FIBRILLATION: ICD-10-CM

## 2019-10-19 DIAGNOSIS — J93.9 PNEUMOTHORAX, UNSPECIFIED: ICD-10-CM

## 2019-10-19 LAB
ANION GAP SERPL CALC-SCNC: 17 MMOL/L — SIGNIFICANT CHANGE UP (ref 5–17)
BUN SERPL-MCNC: 57 MG/DL — HIGH (ref 7–23)
CALCIUM SERPL-MCNC: 8.4 MG/DL — SIGNIFICANT CHANGE UP (ref 8.4–10.5)
CHLORIDE SERPL-SCNC: 91 MMOL/L — LOW (ref 96–108)
CO2 SERPL-SCNC: 29 MMOL/L — SIGNIFICANT CHANGE UP (ref 22–31)
CREAT SERPL-MCNC: 2.07 MG/DL — HIGH (ref 0.5–1.3)
GLUCOSE BLDC GLUCOMTR-MCNC: 336 MG/DL — HIGH (ref 70–99)
GLUCOSE BLDC GLUCOMTR-MCNC: 369 MG/DL — HIGH (ref 70–99)
GLUCOSE SERPL-MCNC: 222 MG/DL — HIGH (ref 70–99)
POTASSIUM SERPL-MCNC: 3.5 MMOL/L — SIGNIFICANT CHANGE UP (ref 3.5–5.3)
POTASSIUM SERPL-SCNC: 3.5 MMOL/L — SIGNIFICANT CHANGE UP (ref 3.5–5.3)
SODIUM SERPL-SCNC: 137 MMOL/L — SIGNIFICANT CHANGE UP (ref 135–145)

## 2019-10-19 PROCEDURE — 71250 CT THORAX DX C-: CPT

## 2019-10-19 PROCEDURE — 82570 ASSAY OF URINE CREATININE: CPT

## 2019-10-19 PROCEDURE — 71046 X-RAY EXAM CHEST 2 VIEWS: CPT

## 2019-10-19 PROCEDURE — 83036 HEMOGLOBIN GLYCOSYLATED A1C: CPT

## 2019-10-19 PROCEDURE — 82962 GLUCOSE BLOOD TEST: CPT

## 2019-10-19 PROCEDURE — 82550 ASSAY OF CK (CPK): CPT

## 2019-10-19 PROCEDURE — C8929: CPT

## 2019-10-19 PROCEDURE — 87798 DETECT AGENT NOS DNA AMP: CPT

## 2019-10-19 PROCEDURE — 85730 THROMBOPLASTIN TIME PARTIAL: CPT

## 2019-10-19 PROCEDURE — 84156 ASSAY OF PROTEIN URINE: CPT

## 2019-10-19 PROCEDURE — 85379 FIBRIN DEGRADATION QUANT: CPT

## 2019-10-19 PROCEDURE — 99232 SBSQ HOSP IP/OBS MODERATE 35: CPT

## 2019-10-19 PROCEDURE — 99285 EMERGENCY DEPT VISIT HI MDM: CPT | Mod: 25

## 2019-10-19 PROCEDURE — 85027 COMPLETE CBC AUTOMATED: CPT

## 2019-10-19 PROCEDURE — 83880 ASSAY OF NATRIURETIC PEPTIDE: CPT

## 2019-10-19 PROCEDURE — 36415 COLL VENOUS BLD VENIPUNCTURE: CPT

## 2019-10-19 PROCEDURE — 81001 URINALYSIS AUTO W/SCOPE: CPT

## 2019-10-19 PROCEDURE — 93005 ELECTROCARDIOGRAM TRACING: CPT

## 2019-10-19 PROCEDURE — 87581 M.PNEUMON DNA AMP PROBE: CPT

## 2019-10-19 PROCEDURE — 87486 CHLMYD PNEUM DNA AMP PROBE: CPT

## 2019-10-19 PROCEDURE — 84484 ASSAY OF TROPONIN QUANT: CPT

## 2019-10-19 PROCEDURE — 87633 RESP VIRUS 12-25 TARGETS: CPT

## 2019-10-19 PROCEDURE — 85610 PROTHROMBIN TIME: CPT

## 2019-10-19 PROCEDURE — 82553 CREATINE MB FRACTION: CPT

## 2019-10-19 PROCEDURE — 80053 COMPREHEN METABOLIC PANEL: CPT

## 2019-10-19 PROCEDURE — 80048 BASIC METABOLIC PNL TOTAL CA: CPT

## 2019-10-19 RX ORDER — SOD,AMMONIUM,POTASSIUM LACTATE
1 CREAM (GRAM) TOPICAL
Qty: 0 | Refills: 0 | DISCHARGE
Start: 2019-10-19

## 2019-10-19 RX ORDER — BUMETANIDE 0.25 MG/ML
1 INJECTION INTRAMUSCULAR; INTRAVENOUS
Qty: 0 | Refills: 0 | DISCHARGE
Start: 2019-10-19

## 2019-10-19 RX ORDER — INSULIN ASPART 100 [IU]/ML
8 INJECTION, SOLUTION SUBCUTANEOUS
Qty: 1 | Refills: 0

## 2019-10-19 RX ORDER — COLCHICINE 0.6 MG
1 TABLET ORAL
Qty: 0 | Refills: 0 | DISCHARGE

## 2019-10-19 RX ORDER — INSULIN GLARGINE 100 [IU]/ML
21 INJECTION, SOLUTION SUBCUTANEOUS
Qty: 1 | Refills: 0

## 2019-10-19 RX ADMIN — Medication 10: at 09:41

## 2019-10-19 RX ADMIN — Medication 100 MILLIGRAM(S): at 05:43

## 2019-10-19 RX ADMIN — Medication 100 MILLIGRAM(S): at 05:42

## 2019-10-19 RX ADMIN — BUMETANIDE 2 MILLIGRAM(S): 0.25 INJECTION INTRAMUSCULAR; INTRAVENOUS at 05:42

## 2019-10-19 RX ADMIN — DABIGATRAN ETEXILATE MESYLATE 150 MILLIGRAM(S): 150 CAPSULE ORAL at 05:42

## 2019-10-19 RX ADMIN — Medication 8: at 13:08

## 2019-10-19 NOTE — DISCHARGE NOTE NURSING/CASE MANAGEMENT/SOCIAL WORK - PATIENT PORTAL LINK FT
You can access the FollowMyHealth Patient Portal offered by [facility] by registering at the following website: [email or web address]. By joining [facility]’s FollowMyHealth portal, you will also be able to view your health information using other applications (apps) compatible with our system.

## 2019-10-19 NOTE — DISCHARGE NOTE PROVIDER - NSDCCPCAREPLAN_GEN_ALL_CORE_FT
PRINCIPAL DISCHARGE DIAGNOSIS  Diagnosis: Chest pain  Assessment and Plan of Treatment: Call your doctor if you have any new pain, pressure, or discomfort in the center of your chest, pain, tingling or discomfort in arms, back, neck, jaw, or stomach, shortness of breath, nausea, vomiting, burping or heartburn, sweating, cold and clammy skin, racing or abnormal heartbeat for more than 10 minutes or if they keep coming & going.  Call 911 and do not try to get to hospital by car.      SECONDARY DISCHARGE DIAGNOSES  Diagnosis: Hydropneumothorax  Assessment and Plan of Treatment: Follow up with your Pulmonologist within 1 week, sooner if symptoms return or worsen.    Diagnosis: Pleural effusion  Assessment and Plan of Treatment: Follow up with your Pulmonologist within 1 week, sooner if symptoms return or worsen.    Diagnosis: CHF (congestive heart failure)  Assessment and Plan of Treatment: Take all medications as prescribed.  Stop smoking if you currently smoke, and avoid high altitudes.  Weigh yourself daily.  If you gain 3lbs in 3 days, or 5lbs in a week call your Health Care Provider.  Eat a low sodium diet.  If you have pulmonary hypertension and you are a female of child bearing age, talk to your caregiver about using birth control pills or getting pregnant.  Call your Health Care Provider if you have any swelling or increased swelling in your feet, ankles, and/or stomach.  If you experience dizziness, chest pain, or shortness of breath, seek immediate medical attention.    Diagnosis: Atrial fibrillation  Assessment and Plan of Treatment: Atrial fibrillation is a common heart rhythm problem which increases the risk of stroke and heat attack.  It helps if you control your blood pressure, avoid alcohol, cut down on caffeine, get treatment for your thyroid if it is overactive, and perform moderate exercise in consultation with your Primary Care Provider.  Call your doctor if you experience chest tightness/pain, lightheadedness, loss of consciousness, shortness of breath (especially with exercise), feel your heart racing or beating unusually, frequent or abnormal bleeding.  It is important to take all your heart medications as prescribed.      Diagnosis: IDDM (insulin dependent diabetes mellitus)  Assessment and Plan of Treatment: Make sure you get your HgA1c checked every three months.   It's important not to skip any meals.  Keep a log of your blood glucose results and always take it with you to your doctor appointments.  Keep a list of your current medications including over the counter medications and bring this medication list with you to all your doctor appointments.  If you have not seen your ophthalmologist this year, call for appointment.  Check your feet daily for redness, sores, or openings.  Do not self treat.  If there is no improvement in two days, call your primary care physician for an appointment.  HgA1c this admission was _. PRINCIPAL DISCHARGE DIAGNOSIS  Diagnosis: Chest pain  Assessment and Plan of Treatment: Call your doctor if you have any new pain, pressure, or discomfort in the center of your chest, pain, tingling or discomfort in arms, back, neck, jaw, or stomach, shortness of breath, nausea, vomiting, burping or heartburn, sweating, cold and clammy skin, racing or abnormal heartbeat for more than 10 minutes or if they keep coming & going.  Call 911 and do not try to get to hospital by car.      SECONDARY DISCHARGE DIAGNOSES  Diagnosis: Hydropneumothorax  Assessment and Plan of Treatment: Follow up with your Pulmonologist within 1 week, sooner if symptoms return or worsen.    Diagnosis: Pleural effusion  Assessment and Plan of Treatment: Follow up with your Pulmonologist within 1 week, sooner if symptoms return or worsen.    Diagnosis: CHF (congestive heart failure)  Assessment and Plan of Treatment: Take all medications as prescribed.  Stop smoking if you currently smoke, and avoid high altitudes.  Weigh yourself daily.  If you gain 3lbs in 3 days, or 5lbs in a week call your Health Care Provider.  Eat a low sodium diet.  If you have pulmonary hypertension and you are a female of child bearing age, talk to your caregiver about using birth control pills or getting pregnant.  Call your Health Care Provider if you have any swelling or increased swelling in your feet, ankles, and/or stomach.  If you experience dizziness, chest pain, or shortness of breath, seek immediate medical attention.    Diagnosis: Atrial fibrillation  Assessment and Plan of Treatment: Atrial fibrillation is a common heart rhythm problem which increases the risk of stroke and heat attack.  It helps if you control your blood pressure, avoid alcohol, cut down on caffeine, get treatment for your thyroid if it is overactive, and perform moderate exercise in consultation with your Primary Care Provider.  Call your doctor if you experience chest tightness/pain, lightheadedness, loss of consciousness, shortness of breath (especially with exercise), feel your heart racing or beating unusually, frequent or abnormal bleeding.  It is important to take all your heart medications as prescribed.      Diagnosis: IDDM (insulin dependent diabetes mellitus)  Assessment and Plan of Treatment: Make sure you get your HgA1c checked every three months.   It's important not to skip any meals.  Keep a log of your blood glucose results and always take it with you to your doctor appointments.  Keep a list of your current medications including over the counter medications and bring this medication list with you to all your doctor appointments.  If you have not seen your ophthalmologist this year, call for appointment.  Check your feet daily for redness, sores, or openings.  Do not self treat.  If there is no improvement in two days, call your primary care physician for an appointment.  HgA1c this admission was 10.3.

## 2019-10-19 NOTE — DISCHARGE NOTE PROVIDER - CARE PROVIDER_API CALL
Bari El)  Critical Care Medicine  [address], [address]  [place] [address]  Phone: [phone]  Fax: [phone]  Follow Up Time:     Yayo Gallegos)  Internal Medicine; Nephrology  [address], [address]  [place] [address]  Phone: [phone]  Fax: [phone]  Follow Up Time: Bari El)  Critical Care Medicine  [address], [address]  [place] [address]  Phone: [phone]  Fax: [phone]  Follow Up Time:     Yayo Gallegos)  Internal Medicine; Nephrology  [address], [address]  [place] [address]  Phone: [phone]  Fax: [phone]  Follow Up Time:     Endocrinologist, Your Own  Phone: (   )    -  Fax: (   )    -  Follow Up Time: 1 week

## 2019-10-19 NOTE — PROGRESS NOTE ADULT - PROBLEM SELECTOR PLAN 1
- no acute intervention for PTX as it is very small and asymptomatic  Diagnostic and thoracentesis Of right pleural effusion per pulmonary as out pt per pt request. Ct surgery to sign off. Reconsult PRN

## 2019-10-19 NOTE — DISCHARGE NOTE PROVIDER - NSDCFUADDINST_GEN_ALL_CORE_FT
Follow up with your Primary Care Doctor within 1 week.  Follow up with your Pulmonologist within 1 week. Follow up with your Primary Care Doctor within 1 week.  Follow up with your Pulmonologist within 1 week.  Follow up with your Endocrinologist within 1 week.

## 2019-10-19 NOTE — DISCHARGE NOTE PROVIDER - NSDCFUSCHEDAPPT_GEN_ALL_CORE_FT
DYLAN DEL CASTILLO ; 01/03/2020 ; NPP Med Endocr [address] [facility] DYLAN DEL CASTILLO ; 01/03/2020 ; NPP Med Endocr [address] [facility]

## 2019-10-19 NOTE — PROGRESS NOTE ADULT - SUBJECTIVE AND OBJECTIVE BOX
Subjective: Pt states" " Denies any CP, SOB, palpitations. No acute events overnight.    Vital Signs:  Vital Signs Last 24 Hrs  T(C): 36.7 (10-19-19 @ 04:06), Max: 37.3 (10-18-19 @ 20:57)  T(F): 98.1 (10-19-19 @ 04:06), Max: 99.2 (10-18-19 @ 20:57)  HR: 65 (10-19-19 @ 04:06) (65 - 103)  BP: 145/74 (10-19-19 @ 04:06) (109/73 - 145/74)  RR: 18 (10-19-19 @ 04:06) (18 - 19)  SpO2: 97% (10-19-19 @ 04:06) (90% - 97%) on (O2)        Relevant labs, radiology and Medications reviewed                        16.2   9.86  )-----------( 169      ( 18 Oct 2019 07:02 )             51.5     10-19    137  |  91<L>  |  57<H>  ----------------------------<  222<H>  3.5   |  29  |  2.07<H>    Ca    8.4      19 Oct 2019 06:19    TPro  7.1  /  Alb  3.0<L>  /  TBili  0.6  /  DBili  x   /  AST  11  /  ALT  6<L>  /  AlkPhos  106  10-18      MEDICATIONS  (STANDING):  ammonium lactate 12% Lotion 1 Application(s) Topical two times a day  aspirin enteric coated 81 milliGRAM(s) Oral daily  buMETAnide 2 milliGRAM(s) Oral daily  dabigatran 150 milliGRAM(s) Oral two times a day  dextrose 5%. 1000 milliLiter(s) (50 mL/Hr) IV Continuous <Continuous>  dextrose 50% Injectable 12.5 Gram(s) IV Push once  dextrose 50% Injectable 25 Gram(s) IV Push once  dextrose 50% Injectable 25 Gram(s) IV Push once  influenza   Vaccine 0.5 milliLiter(s) IntraMuscular once  insulin glargine Injectable (LANTUS) 21 Unit(s) SubCutaneous at bedtime  insulin lispro (HumaLOG) corrective regimen sliding scale   SubCutaneous three times a day before meals  insulin lispro (HumaLOG) corrective regimen sliding scale   SubCutaneous at bedtime  metolazone 5 milliGRAM(s) Oral <User Schedule>  metoprolol succinate  milliGRAM(s) Oral daily  pregabalin 100 milliGRAM(s) Oral two times a day    MEDICATIONS  (PRN):  dextrose 40% Gel 15 Gram(s) Oral once PRN Blood Glucose LESS THAN 70 milliGRAM(s)/deciliter  glucagon  Injectable 1 milliGRAM(s) IntraMuscular once PRN Glucose LESS THAN 70 milligrams/deciliter      I&O's Summary    18 Oct 2019 07:01  -  19 Oct 2019 07:00  --------------------------------------------------------  IN: 660 mL / OUT: 1000 mL / NET: -340 mL        IMAGING    CXR:    CT Chest:    PAST MEDICAL & SURGICAL HISTORY:  CAD (coronary artery disease)  MI (myocardial infarction): circa 2014  Atrial fibrillation  TIA (transient ischemic attack)  DM type 2 (diabetes mellitus, type 2)  HLD (hyperlipidemia)  HTN (hypertension), benign  S/P carotid endarterectomy: left  Status post angioplasty with stent: LULU x 3 2/7/2014       Physical Exam:  Neurology: A&Ox3, nonfocal, ROA x 4, NAD  Respiratory: B/L BS CTA, diminished at bases, No wheezing, rales, rhonchi  CV: RRR, S1S2

## 2019-10-19 NOTE — DISCHARGE NOTE PROVIDER - HOSPITAL COURSE
64 year old male with hypertension, hyperlipidemia, atrial fibrillation, s/p DCCV, coronary artery disease, NSTEMI, (2/2014), s/p PCI to mid LAD, proximal LCx, distal LCx (2/14, [facility], [facility]), congestive heart failure, normal ejection fraction, diabetes mellitus, CVA, severe left Internal carotid disease s/p CEA (2/2014), presenting with chest pain and SOB.  HS trop x 2 elevated with normal CK and CKMB, likely 2/2 to renal disease.  Chest pain noted to be pleuritic and reproducible – resolved, thought 2/2 hydropneumothorax which was noted on CT – also with trace pleural effusions, Rr pleural effusion > Lt in setting of CHF.  Patient was evaluated by Thoracic Surgery - no acute intervention for PTX.  Patient also treated for possible pericarditis.  Pulmonary suggest drainage of effusion and sampling of fluid and analysis, however patient refused.  Echo with no evidence of pericardial effusion, mod LAE, grossly borderline LV sys dysfx EF 45% (reported LVEF in last study is slightly lower, but atrial fibrillation makes it difficult) - no wall motion abnormality.  Patient s/p IV diuretics, now back on PO diuretics.  Patient cleared by IM for discharge to home - to follow up with Primary Care and Pulmonary within 1 week. 64 year old male with hypertension, hyperlipidemia, atrial fibrillation, s/p DCCV, coronary artery disease, NSTEMI, (2/2014), s/p PCI to mid LAD, proximal LCx, distal LCx (2/14, [facility], [facility]), congestive heart failure, normal ejection fraction, diabetes mellitus, CVA, severe left Internal carotid disease s/p CEA (2/2014), presenting with chest pain and SOB.  HS trop x 2 elevated with normal CK and CKMB, likely 2/2 to renal disease.  Chest pain noted to be pleuritic and reproducible – resolved, thought 2/2 hydropneumothorax which was noted on CT – also with trace pleural effusions, Rr pleural effusion > Lt in setting of CHF.  Patient was evaluated by Thoracic Surgery - no acute intervention for PTX.  Patient also treated for possible pericarditis.  Pulmonary suggest drainage of effusion and sampling of fluid and analysis, however patient refused.  Echo with no evidence of pericardial effusion, mod LAE, grossly borderline LV sys dysfx EF 45% (reported LVEF in last study is slightly lower, but atrial fibrillation makes it difficult) - no wall motion abnormality.  Patient s/p IV diuretics, now back on PO diuretics.  Patient cleared by IM for discharge to home - to follow up with Primary Care, Pulmonary, and Endocrinology within 1 week.

## 2019-10-19 NOTE — PROGRESS NOTE ADULT - ASSESSMENT
64 year old male with Hypertension, Hyperlipidemia, Atrial Fibrillation, s/p DCCV, Coronary Artery Disease, NSTEMI, (2/2014), s/p PCI to mid LAD, Proximal LCx, Distal LCx (2/14, [facility], LULU), Congestive Heart Failure, Normal Ejection Fraction, Diabetes Mellitus, CVA, Severe Left Internal Carotid Disease, s/p CEA (2/2014) presenting with chest pain and sob     1. Atypical chest pain, Coronary Artery Disease. S/P PCI to LAD, LCx.   pleuritic and reproducible, resolved, and secondary to hydropneumothorax  noted on CT  echo with no evidence of pericardial effusion, mod LAE, grossly borderline LV sys dysfx EF 45% (reported LVEF in last study is  slightly lower, but atrial fibrillation makes it difficult), no wall motion abnl  to assess ejection fraction).  no evidence of ACS, D-dimer negative  leukocytosis resolved; RVP neg  HS trop x 2elevated with nl CK, CKMB likely secondary to renal disease, known CAD   CT chest revealed Right-sided hydropneumothorax, RLL atelectasis , small left effusion with atelectasis    chest xray with sml bl pleural effusions    Continue ASA 81mg and statin    2. Acute on Chronic Congestive heart failure, Diastolic.   chest xray with small BL pleural effusion   orthopnea, resolved, change bumex to 2 mg PO daily   c/w zaroxloyn 5mg 1 x weekly.    3. Atrial Fibrillation.  mildly RVR noted, asymptomatic  c/w metoprolol succinate ER 100mg daily  Continue pradaxa 150mg BID    4. Right-sided hydropneumothorax  ct chest noted, Thorac eval noted no acute intervention for PTX  pt refusing chest tube/ drainage   repeat imaging per Pulm     no further workup for cv standpoint     dvt ppx

## 2019-10-19 NOTE — PROGRESS NOTE ADULT - SUBJECTIVE AND OBJECTIVE BOX
CHIEF COMPLAINT:Patient is a 64y old  Male who presents with a chief complaint of   	        PAST MEDICAL & SURGICAL HISTORY:  CAD (coronary artery disease)  MI (myocardial infarction): circa 2014  Atrial fibrillation  TIA (transient ischemic attack)  DM type 2 (diabetes mellitus, type 2)  HLD (hyperlipidemia)  HTN (hypertension), benign  S/P carotid endarterectomy: left  Status post angioplasty with stent: LULU x 3 2/7/2014          REVIEW OF SYSTEMS:  feels better   EYES: No eye pain, visual disturbances, or discharge  NECK: No pain or stiffness  RESPIRATORY: No cough, wheezing, chills or hemoptysis; No Shortness of Breath  CARDIOVASCULAR: No chest pain, palpitations, passing out, dizziness, or leg swelling  GASTROINTESTINAL: No abdominal or epigastric pain. No nausea, vomiting, or hematemesis; No diarrhea or constipation. No melena or hematochezia.  GENITOURINARY: No dysuria, frequency, hematuria, or incontinence  NEUROLOGICAL: No headaches, memory loss, loss of strength, numbness, or tremors    MUSCULOSKELETAL: No joint pain or swelling; No muscle, back, or extremity pain    Medications:  MEDICATIONS  (STANDING):  ammonium lactate 12% Lotion 1 Application(s) Topical two times a day  aspirin enteric coated 81 milliGRAM(s) Oral daily  buMETAnide 2 milliGRAM(s) Oral daily  dabigatran 150 milliGRAM(s) Oral two times a day  dextrose 5%. 1000 milliLiter(s) (50 mL/Hr) IV Continuous <Continuous>  dextrose 50% Injectable 12.5 Gram(s) IV Push once  dextrose 50% Injectable 25 Gram(s) IV Push once  dextrose 50% Injectable 25 Gram(s) IV Push once  influenza   Vaccine 0.5 milliLiter(s) IntraMuscular once  insulin glargine Injectable (LANTUS) 21 Unit(s) SubCutaneous at bedtime  insulin lispro (HumaLOG) corrective regimen sliding scale   SubCutaneous three times a day before meals  insulin lispro (HumaLOG) corrective regimen sliding scale   SubCutaneous at bedtime  metolazone 5 milliGRAM(s) Oral <User Schedule>  metoprolol succinate  milliGRAM(s) Oral daily  pregabalin 100 milliGRAM(s) Oral two times a day    MEDICATIONS  (PRN):  dextrose 40% Gel 15 Gram(s) Oral once PRN Blood Glucose LESS THAN 70 milliGRAM(s)/deciliter  glucagon  Injectable 1 milliGRAM(s) IntraMuscular once PRN Glucose LESS THAN 70 milligrams/deciliter    	    PHYSICAL EXAM:  T(C): 36.7 (10-19-19 @ 04:06), Max: 37.3 (10-18-19 @ 20:57)  HR: 65 (10-19-19 @ 04:06) (65 - 103)  BP: 145/74 (10-19-19 @ 04:06) (109/73 - 145/74)  RR: 18 (10-19-19 @ 04:06) (18 - 19)  SpO2: 97% (10-19-19 @ 04:06) (90% - 97%)  Wt(kg): --  I&O's Summary    18 Oct 2019 07:01  -  19 Oct 2019 07:00  --------------------------------------------------------  IN: 660 mL / OUT: 1000 mL / NET: -340 mL        Appearance: Normal	  HEENT:   Normal oral mucosa, PERRL, EOMI	  Lymphatic: No lymphadenopathy  Cardiovascular: Normal S1 S2, No JVD,   Respiratory: dec bs   Psychiatry: A & O x 3,  Gastrointestinal:  Soft, Non-tender, + BS	  Skin: No rashes, No ecchymoses, No cyanosis	  Neurologic: Non-focal  Extremities: pvd  dec edema    TELEMETRY: 	    ECG:  	  RADIOLOGY:  OTHER: 	  	  LABS:	 	    CARDIAC MARKERS:                                16.2   9.86  )-----------( 169      ( 18 Oct 2019 07:02 )             51.5     10-19    137  |  91<L>  |  57<H>  ----------------------------<  222<H>  3.5   |  29  |  2.07<H>    Ca    8.4      19 Oct 2019 06:19    TPro  7.1  /  Alb  3.0<L>  /  TBili  0.6  /  DBili  x   /  AST  11  /  ALT  6<L>  /  AlkPhos  106  10-18    proBNP:   Lipid Profile:   HgA1c:   TSH:

## 2019-10-19 NOTE — PROGRESS NOTE ADULT - PROVIDER SPECIALTY LIST ADULT
Cardiology
Internal Medicine
Nephrology
Pulmonology
Thoracic Surgery
Pulmonology

## 2019-10-19 NOTE — DISCHARGE NOTE PROVIDER - PROVIDER TOKENS
PROVIDER:[TOKEN:[[id number]]],PROVIDER:[TOKEN:[[id number]]] PROVIDER:[TOKEN:[[id number]]],PROVIDER:[TOKEN:[[id number]]],FREE:[LAST:[Endocrinologist],FIRST:[Your Own],PHONE:[(   )    -],FAX:[(   )    -],FOLLOWUP:[1 week]]

## 2019-10-19 NOTE — PROGRESS NOTE ADULT - SUBJECTIVE AND OBJECTIVE BOX
CC: no cp/sob    TELEMETRY: afib     PHYSICAL EXAM:    T(C): 36.7 (10-19-19 @ 04:06), Max: 37.3 (10-18-19 @ 20:57)  HR: 65 (10-19-19 @ 04:06) (65 - 103)  BP: 145/74 (10-19-19 @ 04:06) (109/73 - 145/74)  RR: 18 (10-19-19 @ 04:06) (18 - 19)  SpO2: 97% (10-19-19 @ 04:06) (90% - 97%)  Wt(kg): --  I&O's Summary    18 Oct 2019 07:01  -  19 Oct 2019 07:00  --------------------------------------------------------  IN: 660 mL / OUT: 1000 mL / NET: -340 mL        Appearance: Normal	  Cardiovascular: irreg S1 S2,RRR, No JVD, No murmurs  Respiratory: Lungs clear to auscultation	  Gastrointestinal:  Soft, Non-tender, + BS	  Extremities: Normal range of motion, No clubbing, cyanosis or edema  Vascular: Peripheral pulses palpable 2+ bilaterally     LABS:	 	                          16.2   9.86  )-----------( 169      ( 18 Oct 2019 07:02 )             51.5     10-19    137  |  91<L>  |  57<H>  ----------------------------<  222<H>  3.5   |  29  |  2.07<H>    Ca    8.4      19 Oct 2019 06:19    TPro  7.1  /  Alb  3.0<L>  /  TBili  0.6  /  DBili  x   /  AST  11  /  ALT  6<L>  /  AlkPhos  106  10-18          CARDIAC MARKERS:

## 2019-10-19 NOTE — PROGRESS NOTE ADULT - ASSESSMENT
pt w/ hx cad / mi/dm/htn/a fib / w/ [facility]   smallptx/pl effusion  pulm f/u noted    pt currently refusing ang drainage of pleural fluid  aware of risks  will f/u as outpt  pericarditis improved   echo noted  renal eval noted    tele  c/w outp meds  dm  fsg riss      d.c home

## 2019-11-05 ENCOUNTER — INPATIENT (INPATIENT)
Facility: HOSPITAL | Age: 64
LOS: 11 days | Discharge: ANOTHER HOSPITAL | DRG: 187 | End: 2019-11-17
Attending: INTERNAL MEDICINE | Admitting: INTERNAL MEDICINE
Payer: COMMERCIAL

## 2019-11-05 VITALS
RESPIRATION RATE: 22 BRPM | SYSTOLIC BLOOD PRESSURE: 113 MMHG | OXYGEN SATURATION: 95 % | HEIGHT: 71.5 IN | DIASTOLIC BLOOD PRESSURE: 59 MMHG | HEART RATE: 80 BPM | WEIGHT: 291.89 LBS | TEMPERATURE: 98 F

## 2019-11-05 DIAGNOSIS — I50.9 HEART FAILURE, UNSPECIFIED: ICD-10-CM

## 2019-11-05 DIAGNOSIS — J94.8 OTHER SPECIFIED PLEURAL CONDITIONS: ICD-10-CM

## 2019-11-05 DIAGNOSIS — J90 PLEURAL EFFUSION, NOT ELSEWHERE CLASSIFIED: ICD-10-CM

## 2019-11-05 DIAGNOSIS — Z98.89 OTHER SPECIFIED POSTPROCEDURAL STATES: Chronic | ICD-10-CM

## 2019-11-05 DIAGNOSIS — Z95.5 PRESENCE OF CORONARY ANGIOPLASTY IMPLANT AND GRAFT: Chronic | ICD-10-CM

## 2019-11-05 DIAGNOSIS — R06.00 DYSPNEA, UNSPECIFIED: ICD-10-CM

## 2019-11-05 LAB
ALBUMIN SERPL ELPH-MCNC: 3 G/DL — LOW (ref 3.3–5)
ALP SERPL-CCNC: 91 U/L — SIGNIFICANT CHANGE UP (ref 40–120)
ALT FLD-CCNC: 7 U/L — LOW (ref 10–45)
ANION GAP SERPL CALC-SCNC: 13 MMOL/L — SIGNIFICANT CHANGE UP (ref 5–17)
ANION GAP SERPL CALC-SCNC: 15 MMOL/L — SIGNIFICANT CHANGE UP (ref 5–17)
AST SERPL-CCNC: 34 U/L — SIGNIFICANT CHANGE UP (ref 10–40)
BASOPHILS # BLD AUTO: 0.05 K/UL — SIGNIFICANT CHANGE UP (ref 0–0.2)
BASOPHILS NFR BLD AUTO: 0.4 % — SIGNIFICANT CHANGE UP (ref 0–2)
BILIRUB SERPL-MCNC: 0.7 MG/DL — SIGNIFICANT CHANGE UP (ref 0.2–1.2)
BUN SERPL-MCNC: 54 MG/DL — HIGH (ref 7–23)
BUN SERPL-MCNC: 57 MG/DL — HIGH (ref 7–23)
CALCIUM SERPL-MCNC: 8.9 MG/DL — SIGNIFICANT CHANGE UP (ref 8.4–10.5)
CALCIUM SERPL-MCNC: 9.4 MG/DL — SIGNIFICANT CHANGE UP (ref 8.4–10.5)
CHLORIDE SERPL-SCNC: 81 MMOL/L — LOW (ref 96–108)
CHLORIDE SERPL-SCNC: 86 MMOL/L — LOW (ref 96–108)
CO2 SERPL-SCNC: 32 MMOL/L — HIGH (ref 22–31)
CO2 SERPL-SCNC: 33 MMOL/L — HIGH (ref 22–31)
CREAT SERPL-MCNC: 1.8 MG/DL — HIGH (ref 0.5–1.3)
CREAT SERPL-MCNC: 1.83 MG/DL — HIGH (ref 0.5–1.3)
EOSINOPHIL # BLD AUTO: 0.32 K/UL — SIGNIFICANT CHANGE UP (ref 0–0.5)
EOSINOPHIL NFR BLD AUTO: 2.4 % — SIGNIFICANT CHANGE UP (ref 0–6)
GLUCOSE BLDC GLUCOMTR-MCNC: 248 MG/DL — HIGH (ref 70–99)
GLUCOSE BLDC GLUCOMTR-MCNC: 336 MG/DL — HIGH (ref 70–99)
GLUCOSE BLDC GLUCOMTR-MCNC: 370 MG/DL — HIGH (ref 70–99)
GLUCOSE SERPL-MCNC: 398 MG/DL — HIGH (ref 70–99)
GLUCOSE SERPL-MCNC: 445 MG/DL — HIGH (ref 70–99)
HCT VFR BLD CALC: 47.2 % — SIGNIFICANT CHANGE UP (ref 39–50)
HGB BLD-MCNC: 14.8 G/DL — SIGNIFICANT CHANGE UP (ref 13–17)
IMM GRANULOCYTES NFR BLD AUTO: 0.6 % — SIGNIFICANT CHANGE UP (ref 0–1.5)
LYMPHOCYTES # BLD AUTO: 1.57 K/UL — SIGNIFICANT CHANGE UP (ref 1–3.3)
LYMPHOCYTES # BLD AUTO: 11.7 % — LOW (ref 13–44)
MCHC RBC-ENTMCNC: 25.1 PG — LOW (ref 27–34)
MCHC RBC-ENTMCNC: 31.4 GM/DL — LOW (ref 32–36)
MCV RBC AUTO: 80.1 FL — SIGNIFICANT CHANGE UP (ref 80–100)
MONOCYTES # BLD AUTO: 1.04 K/UL — HIGH (ref 0–0.9)
MONOCYTES NFR BLD AUTO: 7.7 % — SIGNIFICANT CHANGE UP (ref 2–14)
NEUTROPHILS # BLD AUTO: 10.39 K/UL — HIGH (ref 1.8–7.4)
NEUTROPHILS NFR BLD AUTO: 77.2 % — HIGH (ref 43–77)
NRBC # BLD: 0 /100 WBCS — SIGNIFICANT CHANGE UP (ref 0–0)
NT-PROBNP SERPL-SCNC: 1310 PG/ML — HIGH (ref 0–300)
PLATELET # BLD AUTO: 303 K/UL — SIGNIFICANT CHANGE UP (ref 150–400)
POTASSIUM SERPL-MCNC: 3.7 MMOL/L — SIGNIFICANT CHANGE UP (ref 3.5–5.3)
POTASSIUM SERPL-MCNC: 5.4 MMOL/L — HIGH (ref 3.5–5.3)
POTASSIUM SERPL-SCNC: 3.7 MMOL/L — SIGNIFICANT CHANGE UP (ref 3.5–5.3)
POTASSIUM SERPL-SCNC: 5.4 MMOL/L — HIGH (ref 3.5–5.3)
PROT SERPL-MCNC: 7.8 G/DL — SIGNIFICANT CHANGE UP (ref 6–8.3)
RBC # BLD: 5.89 M/UL — HIGH (ref 4.2–5.8)
RBC # FLD: 15.6 % — HIGH (ref 10.3–14.5)
SODIUM SERPL-SCNC: 128 MMOL/L — LOW (ref 135–145)
SODIUM SERPL-SCNC: 132 MMOL/L — LOW (ref 135–145)
TROPONIN T, HIGH SENSITIVITY RESULT: 88 NG/L — HIGH (ref 0–51)
TROPONIN T, HIGH SENSITIVITY RESULT: 95 NG/L — HIGH (ref 0–51)
WBC # BLD: 13.45 K/UL — HIGH (ref 3.8–10.5)
WBC # FLD AUTO: 13.45 K/UL — HIGH (ref 3.8–10.5)

## 2019-11-05 PROCEDURE — 93010 ELECTROCARDIOGRAM REPORT: CPT | Mod: NC

## 2019-11-05 PROCEDURE — 71250 CT THORAX DX C-: CPT | Mod: 26

## 2019-11-05 PROCEDURE — 71046 X-RAY EXAM CHEST 2 VIEWS: CPT | Mod: 26

## 2019-11-05 PROCEDURE — 99285 EMERGENCY DEPT VISIT HI MDM: CPT

## 2019-11-05 RX ORDER — DEXTROSE 50 % IN WATER 50 %
25 SYRINGE (ML) INTRAVENOUS ONCE
Refills: 0 | Status: DISCONTINUED | OUTPATIENT
Start: 2019-11-05 | End: 2019-11-17

## 2019-11-05 RX ORDER — INSULIN LISPRO 100/ML
VIAL (ML) SUBCUTANEOUS
Refills: 0 | Status: DISCONTINUED | OUTPATIENT
Start: 2019-11-05 | End: 2019-11-17

## 2019-11-05 RX ORDER — METOPROLOL TARTRATE 50 MG
100 TABLET ORAL DAILY
Refills: 0 | Status: DISCONTINUED | OUTPATIENT
Start: 2019-11-05 | End: 2019-11-17

## 2019-11-05 RX ORDER — INSULIN GLARGINE 100 [IU]/ML
20 INJECTION, SOLUTION SUBCUTANEOUS AT BEDTIME
Refills: 0 | Status: DISCONTINUED | OUTPATIENT
Start: 2019-11-05 | End: 2019-11-06

## 2019-11-05 RX ORDER — DEXTROSE 50 % IN WATER 50 %
15 SYRINGE (ML) INTRAVENOUS ONCE
Refills: 0 | Status: DISCONTINUED | OUTPATIENT
Start: 2019-11-05 | End: 2019-11-17

## 2019-11-05 RX ORDER — ASPIRIN/CALCIUM CARB/MAGNESIUM 324 MG
81 TABLET ORAL DAILY
Refills: 0 | Status: DISCONTINUED | OUTPATIENT
Start: 2019-11-05 | End: 2019-11-17

## 2019-11-05 RX ORDER — INFLUENZA VIRUS VACCINE 15; 15; 15; 15 UG/.5ML; UG/.5ML; UG/.5ML; UG/.5ML
0.5 SUSPENSION INTRAMUSCULAR ONCE
Refills: 0 | Status: DISCONTINUED | OUTPATIENT
Start: 2019-11-05 | End: 2019-11-17

## 2019-11-05 RX ORDER — OXYCODONE HYDROCHLORIDE 5 MG/1
5 TABLET ORAL
Refills: 0 | Status: DISCONTINUED | OUTPATIENT
Start: 2019-11-05 | End: 2019-11-12

## 2019-11-05 RX ORDER — HEPARIN SODIUM 5000 [USP'U]/ML
5000 INJECTION INTRAVENOUS; SUBCUTANEOUS EVERY 12 HOURS
Refills: 0 | Status: DISCONTINUED | OUTPATIENT
Start: 2019-11-05 | End: 2019-11-10

## 2019-11-05 RX ORDER — ACETAMINOPHEN 500 MG
650 TABLET ORAL EVERY 4 HOURS
Refills: 0 | Status: DISCONTINUED | OUTPATIENT
Start: 2019-11-05 | End: 2019-11-17

## 2019-11-05 RX ORDER — GLUCAGON INJECTION, SOLUTION 0.5 MG/.1ML
1 INJECTION, SOLUTION SUBCUTANEOUS ONCE
Refills: 0 | Status: DISCONTINUED | OUTPATIENT
Start: 2019-11-05 | End: 2019-11-17

## 2019-11-05 RX ORDER — INSULIN HUMAN 100 [IU]/ML
6 INJECTION, SOLUTION SUBCUTANEOUS ONCE
Refills: 0 | Status: COMPLETED | OUTPATIENT
Start: 2019-11-05 | End: 2019-11-05

## 2019-11-05 RX ORDER — BUMETANIDE 0.25 MG/ML
2 INJECTION INTRAMUSCULAR; INTRAVENOUS DAILY
Refills: 0 | Status: DISCONTINUED | OUTPATIENT
Start: 2019-11-05 | End: 2019-11-17

## 2019-11-05 RX ORDER — SODIUM CHLORIDE 9 MG/ML
1000 INJECTION, SOLUTION INTRAVENOUS
Refills: 0 | Status: DISCONTINUED | OUTPATIENT
Start: 2019-11-05 | End: 2019-11-17

## 2019-11-05 RX ORDER — DEXTROSE 50 % IN WATER 50 %
12.5 SYRINGE (ML) INTRAVENOUS ONCE
Refills: 0 | Status: DISCONTINUED | OUTPATIENT
Start: 2019-11-05 | End: 2019-11-17

## 2019-11-05 RX ADMIN — OXYCODONE HYDROCHLORIDE 5 MILLIGRAM(S): 5 TABLET ORAL at 18:05

## 2019-11-05 RX ADMIN — INSULIN GLARGINE 20 UNIT(S): 100 INJECTION, SOLUTION SUBCUTANEOUS at 21:22

## 2019-11-05 RX ADMIN — Medication 100 MILLIGRAM(S): at 18:05

## 2019-11-05 RX ADMIN — Medication 4: at 18:05

## 2019-11-05 RX ADMIN — HEPARIN SODIUM 5000 UNIT(S): 5000 INJECTION INTRAVENOUS; SUBCUTANEOUS at 18:05

## 2019-11-05 RX ADMIN — INSULIN HUMAN 6 UNIT(S): 100 INJECTION, SOLUTION SUBCUTANEOUS at 16:13

## 2019-11-05 RX ADMIN — BUMETANIDE 2 MILLIGRAM(S): 0.25 INJECTION INTRAMUSCULAR; INTRAVENOUS at 18:05

## 2019-11-05 RX ADMIN — OXYCODONE HYDROCHLORIDE 5 MILLIGRAM(S): 5 TABLET ORAL at 19:36

## 2019-11-05 NOTE — H&P ADULT - CARDIOVASCULAR
Problem: Self Care Deficits Care Plan (Adult)  Goal: *Acute Goals and Plan of Care (Insert Text)  Occupational Therapy Goals  Initiated 4/20/2018; Reviewed 4/30/18  1. Patient will perform grooming set up assist bedside level within 7 days. 2.  Patient will perform bathing seated with moderate assistance  within 7 day(s). 3.  Patient will perform upper body dressing with minimal assistance/contact guard assist within 7 day(s). 4.  Patient will perform toilet transfers with minimal assistance/contact guard assist within 7 day(s). 5.  Patient will participate in upper extremity therapeutic exercise/activities with supervision/set-up for 10 minutes within 7 day(s). 6.  Patient will utilize energy conservation techniques during functional activities with verbal cues within 7 day(s). Occupational Therapy TREATMENT  Patient: Erick Langford (34 y.o. female)  Date: 5/2/2018  Diagnosis: SOB (shortness of breath)  colon  Anemia  COLON SOB (shortness of breath)    6 Days Post-Op  Precautions: Fall, DNR  Chart, occupational therapy assessment, plan of care, and goals were reviewed. ASSESSMENT:  Patient received supine in bed with son present, mobility team present to turn patient. Requiring Max A and use of draw sheet to complete, unable to  onto bed rails to assist. Patient continues to be limited by impaired activity tolerance, global weakness, impaired cardiopulmonary status, and fluctuating ability to complete functional transfers and ADLs. Patient requiring encouragement to complete simple ADLs, demonstrating fair ability when participating. O2 saturation remaining >97% on 2LO2 NC with oral care. HR elevating 85-96. Patient left in modified bed/chair position with call bell in reach, instructed patient to call for nurse in 20-30 minutes to return to supine and rest. Patient will likely require rehab at discharge.   Progression toward goals:  []       Improving appropriately and progressing toward goals  []       Improving slowly and progressing toward goals  []       Not making progress toward goals and plan of care will be adjusted     PLAN:  Patient continues to benefit from skilled intervention to address the above impairments. Continue treatment per established plan of care. Discharge Recommendations:  Inpatient Rehab  Further Equipment Recommendations for Discharge:  TBD     SUBJECTIVE:   Patient stated I can't.     OBJECTIVE DATA SUMMARY:   Cognitive/Behavioral Status:  Neurologic State: Alert  Orientation Level: Oriented X4  Cognition: Appropriate decision making; Appropriate for age attention/concentration; Follows commands             Functional Mobility and Transfers for ADLs:  Bed Mobility:  Rolling: Maximum assistance  Supine to Sit:  (Utilized bed/chair position)    ADL Intervention:  Grooming  Brushing Teeth: Supervision/set-up     Therapeutic Exercises:   - Patient rolling in bed with Max A d/t weakness and inability to  bed rails for repositioning  - Patient positioned in modified bed/chair in hopes of eating lunch, slouched down, completed five chair pulls up using bed rails to bring back off of mattress. Requiring Max A to complete     Pain:  Pain Scale 1: Numeric (0 - 10)  Pain Intensity 1: 0     Activity Tolerance:   Fair. VSS. HR 85-96bpm, O2 saturation >97% on 2lO2 NC, /53. Please refer to the flowsheet for vital signs taken during this treatment.   After treatment:   [] Patient left in no apparent distress sitting up in chair  [x] Patient left in no apparent distress in bed  [x] Call bell left within reach  [x] Nursing notified  [] Caregiver present  [] Bed alarm activated    COMMUNICATION/COLLABORATION:   The patients plan of care was discussed with: Physical Therapist and Registered Nurse    Ruy Jj OT  Time Calculation: 23 mins negative Regular rate & rhythm, normal S1, S2; no murmurs, gallops or rubs; no S3, S4

## 2019-11-05 NOTE — CONSULT NOTE ADULT - ASSESSMENT
63 y/o M with PMH of CAD s/p stents, HFrEF (EF 40% 12/2018), DMT2, HTN, CKD, NSTEMI, Afib (on Pradaxa). Presents to ED with worsening SOB and productive cough with clear/whitish sputum for the past week. He was recently hospitalized last month and found to have small R hydropneumothorax of unclear etiology. Plans previous admission for drainage of effusion and sampling of fluid and analysis, but patient refused at that time and agreed for outpt f/u. Currently on 2LNC with sats 94% 63 y/o M with PMH of CAD s/p stents, HFrEF (EF 40% 12/2018), DMT2, HTN, CKD, NSTEMI, Afib (on Pradaxa). Presents to ED with worsening SOB and productive cough with clear/whitish sputum for the past week. He was recently hospitalized last month and found to have small R hydropneumothorax of unclear etiology. Plans previous admission for drainage of effusion and sampling of fluid and analysis, but patient refused at that time and agreed for outpt f/u. Currently on 2LNC with sats 94% no distress.

## 2019-11-05 NOTE — CONSULT NOTE ADULT - SUBJECTIVE AND OBJECTIVE BOX
[facility]  [phone]  NEPHROLOGY      INITIAL CONSULT NOTE  --------------------------------------------------------------------------------  HPI:     64 year old male with CKD Hypertension, Hyperlipidemia, Atrial Fibrillation, s/p DCCV, Coronary Artery Disease, NSTEMI, (2/2014), s/p PCI to mid LAD, Proximal LCx, Distal LCx (2/14, [facility], LULU), Congestive Heart Failure,  Diabetes Mellitus, CVA, Severe Left Internal Carotid Disease, s/p CEA (2/2014) recently admitted for hydropneumothorax, a drainage of effusion and sampling of fluid and analysis was suggested however patient refused at that time.  Pt. presents  today with worsening SOB and cough. pt had metolazone with temporary relief  his pmhx also include 2 years ago had gained 60 lbs and was in alex with creatinine as high as upto > 5 mg/dl. it slowly started to recover and recently 1.9    PAST HISTORY  --------------------------------------------------------------------------------  PAST MEDICAL & SURGICAL HISTORY:  Neuropathy  CAD (coronary artery disease)  MI (myocardial infarction): circa 2014  Atrial fibrillation  TIA (transient ischemic attack)  DM type 2 (diabetes mellitus, type 2)  HLD (hyperlipidemia)  HTN (hypertension), benign  S/P carotid endarterectomy: left  Status post angioplasty with stent: LULU x 3 2/7/2014    FAMILY HISTORY:  Family history of heart disease    PAST SOCIAL HISTORY: no tobacco     ALLERGIES & MEDICATIONS  --------------------------------------------------------------------------------  Allergies    No Known Allergies    Intolerances      Standing Inpatient Medications  aspirin enteric coated 81 milliGRAM(s) Oral daily  buMETAnide 2 milliGRAM(s) Oral daily  dextrose 5%. 1000 milliLiter(s) IV Continuous <Continuous>  dextrose 50% Injectable 12.5 Gram(s) IV Push once  dextrose 50% Injectable 25 Gram(s) IV Push once  dextrose 50% Injectable 25 Gram(s) IV Push once  heparin  Injectable 5000 Unit(s) SubCutaneous every 12 hours  influenza   Vaccine 0.5 milliLiter(s) IntraMuscular once  insulin glargine Injectable (LANTUS) 20 Unit(s) SubCutaneous at bedtime  insulin lispro (HumaLOG) corrective regimen sliding scale   SubCutaneous three times a day before meals  metolazone 5 milliGRAM(s) Oral daily  metoprolol succinate  milliGRAM(s) Oral daily  pregabalin 100 milliGRAM(s) Oral two times a day    PRN Inpatient Medications  acetaminophen   Tablet .. 650 milliGRAM(s) Oral every 4 hours PRN  dextrose 40% Gel 15 Gram(s) Oral once PRN  glucagon  Injectable 1 milliGRAM(s) IntraMuscular once PRN  oxyCODONE    IR 5 milliGRAM(s) Oral two times a day PRN      REVIEW OF SYSTEMS  --------------------------------------------------------------------------------  Gen: No  fevers/chills   Skin: No rashes  Head/Eyes/Ears/Mouth: No headache; Normal hearing;  No sinus pain/discomfort, sore throat  Respiratory: +  dyspnea, cough, wheezing, hemoptysis clear sputum   CV: No chest pain, or palp   GI: No abdominal pain, diarrhea, nausea, vomiting, melena  : No dysuria, decrease urination or hesitancy urinating  hematuria, nocturia  MSK: No joint pain/swelling; no back pain  Neuro: No dizziness/lightheadedness,  also with no edema     All other systems were reviewed and are negative, except as noted.    VITALS/PHYSICAL EXAM  --------------------------------------------------------------------------------  T(C): 36.4 (11-05-19 @ 16:38), Max: 36.7 (11-05-19 @ 12:40)  HR: 78 (11-05-19 @ 18:01) (78 - 103)  BP: 128/59 (11-05-19 @ 18:01) (106/60 - 132/72)  RR: 18 (11-05-19 @ 18:01) (18 - 22)  SpO2: 98% (11-05-19 @ 18:01) (93% - 98%)  Wt(kg): --  Height (cm): 181.61 (11-05-19 @ 12:09)  Weight (kg): 132.4 (11-05-19 @ 12:09)  BMI (kg/m2): 40.1 (11-05-19 @ 12:09)  BSA (m2): 2.49 (11-05-19 @ 12:09)      11-05-19 @ 07:01  -  11-05-19 @ 20:28  --------------------------------------------------------  IN: 0 mL / OUT: 400 mL / NET: -400 mL      Physical Exam:  	Gen:  + obese appears with dyspnea mild on O2    	no jvd   	Pulm: decrease bs  R base upto 3/4  no rales or ronchi or wheezing  	CV: RRR, S1S2; no rub  	Back: No CVA tenderness  	Abd: +BS, soft, nontender/nondistended  	: No suprapubic tenderness  	UE: Warm, no cyanosis  no clubbing,  no edema  	LE: Warm, no cyanosis  no clubbing, no edema  	Neuro: alert and oriented. speech coherent   	Skin: Warm, no decrease skin turgor   	    LABS/STUDIES  --------------------------------------------------------------------------------              14.8   13.45 >-----------<  303      [11-05-19 @ 13:30]              47.2     132  |  86  |  57  ----------------------------<  398      [11-05-19 @ 15:07]  3.7   |  33  |  1.80        Ca     8.9     [11-05-19 @ 15:07]    TPro  7.8  /  Alb  3.0  /  TBili  0.7  /  DBili  x   /  AST  34  /  ALT  7   /  AlkPhos  91  [11-05-19 @ 13:30]          Creatinine Trend:  SCr 1.80 [11-05 @ 15:07]  SCr 1.83 [11-05 @ 13:30]  SCr 2.07 [10-19 @ 06:19]  SCr 1.94 [10-18 @ 07:01]  SCr 1.68 [10-16 @ 14:38]    Urinalysis - [10-17-19 @ 04:40]      Color Light Yellow / Appearance Clear / SG 1.015 / pH 6.5      Gluc 500 mg/dL / Ketone Negative  / Bili Negative / Urobili Negative       Blood Small / Protein 300 mg/dL / Leuk Est Negative / Nitrite Negative      RBC 8 / WBC 3 / Hyaline 0 / Gran  / Sq Epi  / Non Sq Epi 0 / Bacteria Negative      HbA1c 10.3      [10-17-19 @ 07:43]  TSH 1.71      [12-28-18 @ 14:58]    HBsAb Nonreact      [02-05-17 @ 11:45]  HBsAg Nonreact      [02-05-17 @ 11:45]  HCV 0.09, Nonreact      [02-03-17 @ 08:51]    dsDNA <12      [02-05-17 @ 11:45]  C3 Complement 123      [02-05-17 @ 11:45]  C4 Complement 33      [02-05-17 @ 11:45]  MPO-ANCA <5.0, interpretation: Negative      [02-05-17 @ 11:45]  PR3-ANCA <5.0, interpretation: Negative      [02-05-17 @ 11:45]  Free Light Chains: kappa 17.30, lambda 8.42, ratio = 2.05      [02-15 @ 07:29]  Immunofixation Serum:   Weak  IgG Kappa Band Identified    Reference Range: None Detected      [02-17-17 @ 07:53]  SPEP Interpretation: Weak Gamma-Migrating Paraprotein Identified      [02-17-17 @ 07:53]  Immunofixation Urine: No Monoclonal Band Identified      [02-18-17 @ 17:41]  UPEP Interpretation: Mild Selective (Glomerular) Proteinuria      [02-18-17 @ 17:41]      < from: CT Chest No Cont (11.05.19 @ 14:42) >  EXAM:  CT CHEST                            PROCEDURE DATE:  11/05/2019            INTERPRETATION:  INDICATION: Shortness of breath. History of   hydropneumothorax.    TECHNIQUE: Unenhanced helical images were obtained of the chest. Coronal   and sagittal images were reconstructed. Maximum intensity projection   images were generated.     COMPARISON: CT chest 10/16/2019.    FINDINGS:     Secretions in the upper trachea.  Interval increase in size of partially   loculated moderate right pleural effusion with near complete compressive   atelectasis of the right lower lobe with interval progression. Suggestion   of right pleural thickening although evaluation of the pleural surface is   limited due to lack of intravenous contrast. Areas of compressive or   rounded atelectasis involving the lateral segment of the right middle   lobe. Lingular and left lower lobe subsegmental atelectasis.  Interval   resolution of left pleural effusion. Scattered bilateral calcified   granulomas. No pneumothorax.    Chronic elevation of the left hemidiaphragm.    Prominent right supraclavicular lymph node as seen in 2017. No axillary   or mediastinal lymphadenopathy.    The heart size is enlarged, particularly left atrium. No pericardial   effusion. Coronary artery and aortic valvular calcifications.   Atherosclerotic calcifications of the aorta. Main pulmonary artery is   dilated measuring up to 3.5 cm at the level of bifurcation which can be   seen in the setting of pulmonary hypertension.    Imagedportions of the upper abdomen is unremarkable.    Thoracic kyphosis with degenerative changes.    IMPRESSION:     Interval increase in size of partially loculated moderate right pleural   effusion with compressive atelectasis of a large portion of the right   lower lobe with interval progression. No pneumothorax.    Evaluation of the right pleural surface is limited due to lack of   intravenous contrast.                JOHN LEONARDO M.D., RADIOLOGY RESIDENT  This document has been electronically signed.  QUENTIN LOPEZ M.D. ATTENDING RADIOLOGIST  This document has been electronically signed. Nov 5 2019  4:33PM

## 2019-11-05 NOTE — H&P ADULT - HISTORY OF PRESENT ILLNESS
pt is a 64 year old male with Hypertension, Hyperlipidemia, Atrial Fibrillation, s/p DCCV, Coronary Artery Disease, NSTEMI, (2/2014), s/p PCI to mid LAD, Proximal LCx, Distal LCx (2/14, [facility], LULU), Congestive Heart Failure,  Diabetes Mellitus, CVA, Severe Left Internal Carotid Disease, s/p CEA (2/2014) recently admitted for hydropneumothorax, a drainage of effusion and sampling of fluid and analysis was suggested however patient refused at that time.   Pt. presenting today with worsening SOB and cough   He was sent in by Dr. El as well as our office for re-peat CT chest and likely drainage of effusion.

## 2019-11-05 NOTE — CONSULT NOTE ADULT - PROBLEM SELECTOR RECOMMENDATION 2
-F/u CT chest non contrast. Likely will need drainage of R effusion and to obtain cytology of pleural fluid to r/o malignancy.  -Last dose of Pradaxa this AM. Please hold further AC in anticipation of drainage of effusion.  -Keep sats >92% with supplemental O2 as needed

## 2019-11-05 NOTE — ED PROVIDER NOTE - CLINICAL SUMMARY MEDICAL DECISION MAKING FREE TEXT BOX
65 yo m w/ multiple medical comorbidities and unk etiology hydropneumothorax pw increased sob. cards and pulm bedside following pt. endorsed to dr bae. labs, ct non con. reassess.

## 2019-11-05 NOTE — CONSULT NOTE ADULT - PROBLEM SELECTOR RECOMMENDATION 9
CT from previous admission with R sided hydropneumothorax of unclear etiology.  -Pt refused drainage at that time.  -Pending CT chest non contrast

## 2019-11-05 NOTE — ED ADULT NURSE NOTE - OBJECTIVE STATEMENT
64 year old male sent from pulmonologist MD Nikko accompanied by daughter, c/o difficulty breathing and wet, non-productive cough x2 weeks. Pt reports increased difficulty breathing and worsening cough last night, waking him from sleep, which brought him in today. Pt reports 8/10 chest pain when coughing, otherwise denies pain. Pt put on 1L O2 NC, and presents with slightly blue lips upon assessment. Pt denies fevers, chills, N/V, abdominal pain, burning or frequency with urination.

## 2019-11-05 NOTE — ED ADULT NURSE REASSESSMENT NOTE - NS ED NURSE REASSESS COMMENT FT1
Pt laying comfortably in bed, accompanied by daughter, aware of plan of care, awaiting results and admission bed. Pt denies desire for food or water, will continue to monitor.

## 2019-11-05 NOTE — ED ADULT NURSE NOTE - PMH
Atrial fibrillation    CAD (coronary artery disease)    DM type 2 (diabetes mellitus, type 2)    HLD (hyperlipidemia)    HTN (hypertension), benign    MI (myocardial infarction)  circa 2014  Neuropathy    TIA (transient ischemic attack)

## 2019-11-05 NOTE — ED PROVIDER NOTE - ATTENDING CONTRIBUTION TO CARE
Private Physician CATHY SAGASTUME,  Cards [employer], Inessa El [facility]monary  64y male Afib on pradaxa, CAD, SP ptca #3 stents, DMT2, HLD ,HTN dc off meds, TIa,  CHF,  hydropneumothorax on last admit. PT left ama "my son was getting " Now returns for eval treatment, Symptoms wax and wane. with peraza, +orthopnea, "I sleep in a chair" has cp with coughing. PE WDWN looking mildly dyspenic, NCAT neck supple chest decreased bs rt chest. CV irregular rapid. Abd soft +bs no mass guarding, rebound, Ext kiara pedal edmea with chronic venous stasis changes  Anselmo Crowley MD, Facep

## 2019-11-05 NOTE — CONSULT NOTE ADULT - SUBJECTIVE AND OBJECTIVE BOX
CARDIOLOGY CONSULT - Dr. Mazariegos     CHIEF COMPLAINT: sob , cough     HPI: 64 year old male with Hypertension, Hyperlipidemia, Atrial Fibrillation, s/p DCCV, Coronary Artery Disease, NSTEMI, (2014), s/p PCI to mid LAD, Proximal LCx, Distal LCx (, [facility], LULU), Congestive Heart Failure, Normal Ejection Fraction, Diabetes Mellitus, CVA, Severe Left Internal Carotid Disease, s/p CEA (2014) recently admitted for hydropneumothorax, a drainage of effusion and sampling of fluid and analysis was suggested however patient refused at that time. Pt. presenting today with worsening SOB and cough. Denies chest pain, LE edema. States he feels like he is more dependent on supplemental o2 however has not been approved for it at home. He was sent in by Dr. El as well as our office for re-peat CT chest and likely drainage of effusion.      PAST MEDICAL & SURGICAL HISTORY:  CAD (coronary artery disease)  MI (myocardial infarction): circa   Atrial fibrillation  TIA (transient ischemic attack)  DM type 2 (diabetes mellitus, type 2)  HLD (hyperlipidemia)  HTN (hypertension), benign  S/P carotid endarterectomy: left  Status post angioplasty with stent: LULU x 3 2014          PREVIOUS DIAGNOSTIC TESTING:    [ ] Echocardiogram: < from: TTE with Doppler (w/Cont) (10.17.19 @ 10:23) >  Conclusions:  1. Moderate left atrial enlargement.  2. Normal left ventricular internal dimensions and wall  thicknesses.  3. Technically difficult study despite use of echo  contrast. Grossly borderline left ventricular systolic  dysfunction. Endocardial visualization enhanced with  intravenous injection of Ultrasonic Enhancing Agent  (Definity). No obvious wall wall motion abnormalities.  4. Right ventricle not well seen.  *** Compared with echocardiogram of 2018, no  significant changes noted (reported LVEF in last study is  slightly lower, but atrial fibrillation makes it difficult  to assess ejection fraction).    < end of copied text >    [ ]  Catheterization: < from: Cardiac Cath Lab (14 @ 08:52) >  [facility]  Department of Cardiology  [address] [place] [address]  [phone]  Cath Lab Report -- Comprehensive Report  Patient: DYLAN DEL CASTILLO  Study date: 2014  Account number: [id number]  MR number: [id number]  : 1955  Gender: Male  Race: W  Case Physician(s):  Colby Mazariegos M.D.  Fellow:  Fco Ledesma MD  Referring Physician:  Wicho Gallegos M.D.  INDICATIONS: Angina/MI: myocardial infarction without ST elevation  (NSTEMI), CCS class III, with onset 2-7 days prior to admission.  HISTORY: The patient has hypertension, insulin-controlled diabetes,  medication-treated dyslipidemia, morbid obesity, and a family history of  coronary artery disease. PRIOR CARDIOVASCULAR PROCEDURES: Stent of the mid  LAD.  PROCEDURE:  --  Intervention on distal circumflex: drug-eluting stent.  --  Intervention on proximal circumflex: drug-eluting stent.  TECHNIQUE: The risks and alternatives of the procedures and conscious  sedation were explained to the patient and informed consent was obtained.  Cardiac catheterization performed electively.  Local anesthetic given. Right radial artery access. RADIATION EXPOSURE: 16  min. A drug-eluting stent was performed on the 80 % lesion in the distal  circumflex. Following intervention there was a 1 % residual stenosis.  Vessel setup was performed. A 6FR EBU 3.5 LAUNCHER guiding catheter was  used to intubate the vessel. Vessel setup was performed. A BMW UNIVERSAL  190CM wire was used to cross the lesion. Balloon angioplasty was  performed, using a 2.5 X 20 MAVERICK RX balloon, with 3 inflations and a  maximum inflation pressure of 12 tom. A 2.50 X 30 RESOLUTE drug-eluting  stent was placed across the lesion and deployed at a maximum inflation  pressure of 16 tom. Balloon angioplasty was performed, with 1 inflations  and a maximum inflation pressure of 16 tom. A drug-eluting stent was  performed on the 80 % lesion in the proximal circumflex. Following  intervention there was a 1 % residual stenosis. Vessel setup was  performed. A 6FR EBU 3.5 LAUNCHER guiding catheter was used to intubate  the vessel. Vessel setup was performed. A BMW UNIVERSAL 190CM wire was  used to cross the lesion. A 3.50 X 15 RESOLUTE drug-eluting stent was  placed across the lesion and deployed at a maximum inflation pressure of  18 tom. Balloon angioplasty was performed, using a 4.0 X 12 NC SPRINTER  balloon, with 2 inflations and a maximum inflation pressure of 22 tom.  CONTRAST GIVEN: Omnipaque 189 ml.  MEDICATIONS GIVEN: Fentanyl, 25 mcg, IV. Midazolam, 2 mg, IV. Midazolam, 1  mg, IV. Verapamil (Isoptin, Calan, Covera), 2 mg, IA. Nitroglycerin, 100  mcg, intracoronary. Heparin, 3000 units, IA. Heparin, 5000 units, IV.  VENTRICLES: No left ventriculogram was performed.  CORONARY VESSELS:  CX:   --  Proximal circumflex: There was a 80 % stenosis.  --  Distal circumflex: There was a 80 % stenosis.  COMPLICATIONS: There were no complications.  DIAGNOSTIC RECOMMENDATIONS: Severe LAD and LCx disease. PCI of proximal and  distal LCx with LULU in the setting of angina/NSTEMI. Patients remains in  sinus rhythm. Continue ASA 81 and plavix daily. Start Pradaxa 150mg BID  for AF CVA ppx after band pull. Will consider stopping one antiplatelet in  4-6 weeks in light of his A/C need for atrial fibrillation. Evaluation in  progress for internal carotid artery intervention. Would wait 4-6 weeks if  clinically feasible in light of recent MI and stenting.  INTERVENTIONAL RECOMMENDATIONS: Severe LAD and LCx disease. PCI of proximal  and distal LCx with LULU in the setting of angina/NSTEMI. Patients remains  in sinus rhythm. Continue ASA 81 and plavix daily. Start Pradaxa 150mg BID  for AF CVA ppx after band pull. Will consider stopping one antiplatelet in  4-6 weeks in light of his A/C need for atrial fibrillation. Evaluation in  progress for internal carotid artery intervention. Would wait 4-6 weeks if  clinically feasible in light of recent MI and stenting.  Prepared and signed by  Colby Mazariegos M.D.    < end of copied text >    [ ] Stress Test:  	    MEDICATIONS:  MEDICATIONS  (STANDING):      FAMILY HISTORY:  Family history of heart disease      SOCIAL HISTORY:    [x] Non-smoker  [ ] Smoker  [ ] Alcohol    Allergies    No Known Allergies    Intolerances    	    REVIEW OF SYSTEMS:  CONSTITUTIONAL: No fever, weight loss, or fatigue  EYES: No eye pain, visual disturbances, or discharge  ENMT:  No difficulty hearing, tinnitus, vertigo; No sinus or throat pain  NECK: No pain or stiffness  RESPIRATORY: No cough, wheezing, chills or hemoptysis; +Shortness of Breath  CARDIOVASCULAR: No chest pain, palpitations, passing out, dizziness, or leg swelling  GASTROINTESTINAL: No abdominal or epigastric pain. No nausea, vomiting, or hematemesis; No diarrhea or constipation. No melena or hematochezia.  GENITOURINARY: No dysuria, frequency, hematuria, or incontinence  NEUROLOGICAL: No headaches, memory loss, loss of strength, numbness, or tremors  SKIN: No itching, burning, rashes, or lesions   	    [x] All others negative	  [ ] Unable to obtain    PHYSICAL EXAM:  T(C): 36.7 (19 @ 12:40), Max: 36.7 (19 @ 12:40)  HR: 87 (19 @ 12:40) (80 - 87)  BP: 106/60 (19 @ 12:40) (106/60 - 113/59)  RR: 18 (19 @ 12:40) (18 - 22)  SpO2: 93% (19 @ 12:40) (93% - 95%)  Wt(kg): --  I&O's Summary      Appearance: Normal	  Psychiatry: A & O x 3, Mood & affect appropriate  HEENT:   Normal oral mucosa, PERRL, EOMI	  Lymphatic: No lymphadenopathy  Cardiovascular: Normal S1 S2,irreg, No JVD, No murmurs  Respiratory: diminished   Gastrointestinal:  Soft, Non-tender, + BS	  Skin: No rashes, No ecchymoses, No cyanosis	  Neurologic: Non-focal  Extremities: Normal range of motion, No clubbing, trace edema   Vascular: Peripheral pulses palpable 2+ bilaterally    TELEMETRY: 	    ECG:  	  RADIOLOGY:  OTHER: 	  	  LABS:	 	    CARDIAC MARKERS:                      proBNP:   Lipid Profile:   HgA1c:   TSH:

## 2019-11-05 NOTE — H&P ADULT - NSICDXPASTMEDICALHX_GEN_ALL_CORE_FT
PAST MEDICAL HISTORY:  Atrial fibrillation     CAD (coronary artery disease)     DM type 2 (diabetes mellitus, type 2)     HLD (hyperlipidemia)     HTN (hypertension), benign     MI (myocardial infarction) circa 2014    Neuropathy     TIA (transient ischemic attack)

## 2019-11-05 NOTE — ED PROVIDER NOTE - PHYSICAL EXAMINATION
General: nad  HEENT: EOMI, PERRLA, normal mucosa, normal oropharynx, no lesions on the lips or on oral mucosa, normal external ear  Neck: supple, no lymphadenopathy, full range of motion, no nuchal rigidity  CV: irregularly irregular  Resp: increased work of breathing  Abd: non-distended  : no CVA tenderness  MSK: full range of motion  Neuro: no focal deficits

## 2019-11-05 NOTE — CONSULT NOTE ADULT - ASSESSMENT
64 year old male with Hypertension, Hyperlipidemia, Atrial Fibrillation, s/p DCCV, Coronary Artery Disease, NSTEMI, (2/2014), s/p PCI to mid LAD, Proximal LCx, Distal LCx (2/14, [facility], LULU), Congestive Heart Failure, Normal Ejection Fraction, Diabetes Mellitus, CVA, Severe Left Internal Carotid Disease, s/p CEA (2/2014) presenting with worsening SOB, cough.     1. SOB, cough   likely 2/2 to untreated hydropneumothorax   cv stable, minimal LE edema, no evidence of ADHF on exam   check repeat Chest CT , labs pending  pulm eval pending for poss drainage     2. Chronic Congestive heart failure, Diastolic.   minimal LE edema, no evidence of ADHF on exam   continue current diuresis regimen   recent echo with no evidence of pericardial effusion, mod LAE, grossly borderline LV sys dysfx EF 45% (reported LVEF in last study is  slightly lower, but atrial fibrillation makes it difficult to assess ejection fraction. no wall motion abnl     3. Atrial Fibrillation, hx  c/w metoprolol succinate ER 100mg daily  Continue pradaxa 150mg BID    4. Right-sided hydropneumothorax  f/u CT chest pending  pulm eval pending    dvtt ppx 64 year old male with Hypertension, Hyperlipidemia, Atrial Fibrillation, s/p DCCV, Coronary Artery Disease, NSTEMI, (2/2014), s/p PCI to mid LAD, Proximal LCx, Distal LCx (2/14, [facility], LULU), Congestive Heart Failure, Normal Ejection Fraction, Diabetes Mellitus, CVA, Severe Left Internal Carotid Disease, s/p CEA (2/2014) presenting with worsening SOB, cough.     1. SOB, cough   likely 2/2 to untreated hydropneumothorax   cv stable, minimal LE edema, no evidence of ADHF on exam   check repeat Chest CT , labs pending  pulm eval pending for poss drainage     2. Chronic Congestive heart failure, Diastolic.   minimal LE edema, no evidence of ADHF on exam   continue current diuresis regimen   recent echo with no evidence of pericardial effusion, mod LAE, grossly borderline LV sys dysfx EF 45% (reported LVEF in last study is  slightly lower, but atrial fibrillation makes it difficult to assess ejection fraction. no wall motion abnl     3. Atrial Fibrillation, hx  c/w metoprolol succinate ER 100mg daily  Continue pradaxa 150mg BID    4. Right-sided hydropneumothorax  f/u CT chest pending  pulm eval pending    5. Coronary Artery Disease. S/P PCI to LAD, LCx.  cv stable w/o chest pain  labs, cxr, pending   EKG with no evidence of ACS   Continue ASA 81mg and statin    dvtt ppx 64 year old male with Hypertension, Hyperlipidemia, Atrial Fibrillation, s/p DCCV, Coronary Artery Disease, NSTEMI, (2/2014), s/p PCI to mid LAD, Proximal LCx, Distal LCx (2/14, [facility], LULU), Congestive Heart Failure, Normal Ejection Fraction, Diabetes Mellitus, CVA, Severe Left Internal Carotid Disease, s/p CEA (2/2014) presenting with worsening SOB, cough.     1. SOB, cough   likely 2/2 to untreated hydropneumothorax   cv stable, minimal LE edema, no evidence of ADHF on exam   check repeat Chest CT , labs pending  pulm eval pending for poss drainage     2. Chronic Congestive heart failure, Diastolic.   minimal LE edema, no evidence of ADHF on exam   continue current diuresis regimen   recent echo with no evidence of pericardial effusion, mod LAE, grossly borderline LV sys dysfx EF 45% (reported LVEF in last study is  slightly lower, but atrial fibrillation makes it difficult to assess ejection fraction. no wall motion abnl     3. Atrial Fibrillation, hx  c/w metoprolol succinate ER 100mg daily  hold pradaxa 150mg BID for poss drainage     4. Right-sided hydropneumothorax  CT from previous admission with R sided hydropneumothorax of unclear etiology.  f/u CT chest pending  pulm eval pending    5. Coronary Artery Disease. S/P PCI to LAD, LCx.  cv stable w/o chest pain  labs, cxr, pending   EKG with no evidence of ACS   Continue ASA 81mg and statin    dvtt ppx

## 2019-11-05 NOTE — CONSULT NOTE ADULT - SUBJECTIVE AND OBJECTIVE BOX
PULMONARY CONSULT    HPI: 63 y/o M with PMH of CAD s/p stents, HFrEF (EF 40% 12/2018), DMT2, HTN, CKD, NSTEMI, Afib (on Pradaxa). Presents to ED with worsening SOB and productive cough with clear/whitish sputum for the past week. He was recently hospitalized last month and found to have small R hydropneumothorax of unclear etiology. Plans previous admission for drainage of effusion and sampling of fluid and analysis, but patient refused at that time and agreed for outpt f/u. Seen in ED pending CXR and CT chest non contrast. Denies fevers, chills, CP.       PAST MEDICAL & SURGICAL HISTORY:  CAD (coronary artery disease)  MI (myocardial infarction): circa 2014  Atrial fibrillation  TIA (transient ischemic attack)  DM type 2 (diabetes mellitus, type 2)  HLD (hyperlipidemia)  HTN (hypertension), benign  S/P carotid endarterectomy: left  Status post angioplasty with stent: LULU x 3 2/7/2014    Allergies  No Known Allergies      FAMILY HISTORY:  Family history of heart disease    Social history: never a smoker    Review of Systems:  CONSTITUTIONAL: No fever, chills, or fatigue  EYES: No eye pain, visual disturbances, or discharge  ENMT:  No difficulty hearing, tinnitus, vertigo; No sinus or throat pain  NECK: No pain or stiffness  RESPIRATORY: Per above  CARDIOVASCULAR: No chest pain, palpitations, dizziness, or leg swelling  GASTROINTESTINAL: No abdominal or epigastric pain. No nausea, vomiting, or hematemesis; No diarrhea or constipation. No melena or hematochezia.  GENITOURINARY: No dysuria, frequency, hematuria, or incontinence  NEUROLOGICAL: No headaches, memory loss, loss of strength, numbness, or tremors  SKIN: No itching, burning, rashes, or lesions   MUSCULOSKELETAL: No joint pain or swelling; No muscle, back. +Bilateral LE neuropathy.  PSYCHIATRIC: No depression, anxiety, mood swings, or difficulty sleeping      Medications:  MEDICATIONS  (STANDING):      Vital Signs Last 24 Hrs  T(C): 36.7 (05 Nov 2019 12:40), Max: 36.7 (05 Nov 2019 12:40)  T(F): 98 (05 Nov 2019 12:40), Max: 98 (05 Nov 2019 12:40)  HR: 87 (05 Nov 2019 12:40) (80 - 87)  BP: 106/60 (05 Nov 2019 12:40) (106/60 - 113/59)  BP(mean): --  RR: 18 (05 Nov 2019 12:40) (18 - 22)  SpO2: 93% (05 Nov 2019 12:40) (93% - 95%)            Physical Examination:    General: No acute distress.      HEENT: Pupils equal, reactive to light.  Symmetric.    PULM: diminished BS R base    CVS: irreg irreg    ABD: Soft, nondistended, nontender, normoactive bowel sounds, no masses    EXT: +3 bilateral LE edema, hyperpigmentation     SKIN: Warm and well perfused, no rashes noted.    NEURO: Alert, oriented, interactive, nonfocal    RADIOLOGY REVIEWED  CT chest 10/16:  IMPRESSION:    Right-sided hydropneumothorax with small amount of pleural air component   and moderate fluid component. Adjacent rounded atelectasis is noted in   the right lower lobe. Small left effusion with adjacent atelectasis. PULMONARY CONSULT    HPI: 65 y/o M with PMH of CAD s/p stents, HFrEF (EF 40% 12/2018), DMT2, HTN, CKD, NSTEMI, Afib (on Pradaxa). Presents to ED with worsening SOB and productive cough with clear/whitish sputum for the past week. He was recently hospitalized last month and found to have small R hydropneumothorax of unclear etiology. Plans previous admission for drainage of effusion and sampling of fluid and analysis, but patient refused at that time and agreed for outpt f/u. Wanted to go to sons wedding. Did not present for outpt visit, called me this am with increased sob and I suggested he goes to ed.  PT pending CT chest non contrast. Denies fevers, chills, CP.       PAST MEDICAL & SURGICAL HISTORY:  CAD (coronary artery disease)  MI (myocardial infarction): circa 2014  Atrial fibrillation  TIA (transient ischemic attack)  DM type 2 (diabetes mellitus, type 2)  HLD (hyperlipidemia)  HTN (hypertension), benign  S/P carotid endarterectomy: left  Status post angioplasty with stent: LULU x 3 2/7/2014    Allergies  No Known Allergies      FAMILY HISTORY:  Family history of heart disease    Social history: never a smoker    Review of Systems:  CONSTITUTIONAL: No fever, chills, or fatigue  EYES: No eye pain, visual disturbances, or discharge  ENMT:  No difficulty hearing, tinnitus, vertigo; No sinus or throat pain  NECK: No pain or stiffness  RESPIRATORY: Per above  CARDIOVASCULAR: No chest pain, palpitations, dizziness, or leg swelling  GASTROINTESTINAL: No abdominal or epigastric pain. No nausea, vomiting, or hematemesis; No diarrhea or constipation. No melena or hematochezia.  GENITOURINARY: No dysuria, frequency, hematuria, or incontinence  NEUROLOGICAL: No headaches, memory loss, loss of strength, numbness, or tremors  SKIN: No itching, burning, rashes, or lesions   MUSCULOSKELETAL: No joint pain or swelling; No muscle, back. +Bilateral LE neuropathy.  PSYCHIATRIC: No depression, anxiety, mood swings, or difficulty sleeping      Medications:  MEDICATIONS  (STANDING):      Vital Signs Last 24 Hrs  T(C): 36.7 (05 Nov 2019 12:40), Max: 36.7 (05 Nov 2019 12:40)  T(F): 98 (05 Nov 2019 12:40), Max: 98 (05 Nov 2019 12:40)  HR: 87 (05 Nov 2019 12:40) (80 - 87)  BP: 106/60 (05 Nov 2019 12:40) (106/60 - 113/59)  BP(mean): --  RR: 18 (05 Nov 2019 12:40) (18 - 22)  SpO2: 93% (05 Nov 2019 12:40) (93% - 95%)            Physical Examination:    General: No acute distress.      HEENT: Pupils equal, reactive to light.  Symmetric.    PULM: diminished BS R base    CVS: irreg irreg    ABD: Soft, nondistended, nontender, normoactive bowel sounds, no masses    EXT: +3 bilateral LE edema, hyperpigmentation     SKIN: Warm and well perfused, no rashes noted.    NEURO: Alert, oriented, interactive, nonfocal    RADIOLOGY REVIEWED  CT chest 10/16:  IMPRESSION:    Right-sided hydropneumothorax with small amount of pleural air component   and moderate fluid component. Adjacent rounded atelectasis is noted in   the right lower lobe. Small left effusion with adjacent atelectasis.

## 2019-11-05 NOTE — CONSULT NOTE ADULT - ASSESSMENT
64 year old male with CKD Hypertension, Hyperlipidemia, Atrial Fibrillation, s/p DCCV, Coronary Artery Disease, NSTEMI, (2/2014), s/p PCI to mid LAD, Proximal LCx, Distal LCx (2/14, [facility], LULU), Congestive Heart Failure,  Diabetes Mellitus, CVA, Severe Left Internal Carotid Disease, s/p CEA (2/2014) recently admitted for hydropneumothorax, a drainage of effusion and sampling of fluid and analysis was suggested however patient refused at that time.  Pt. presents  today with worsening SOB and cough with worsening pl. effusion         1- ckd III   2- proteinuria   3- chf   4- HTN   5- pleural effusion   6- a fib    creatinine steady   nephrotic syndrome to have urine pro/cr ratio   bumex 2 mg po qd   metolazone 5 mg qd  pulm recommendations noted.   cont toprol xl 100 mg qd

## 2019-11-05 NOTE — CHART NOTE - NSCHARTNOTEFT_GEN_A_CORE
Plans for R thoracentesis in IR Thursday. Case d/w Dr. Lucero.   Continue to hold Pradaxa.      Yoko Cedeño NP  [facility]  [phone]

## 2019-11-05 NOTE — H&P ADULT - NSHPLABSRESULTS_GEN_ALL_CORE
14.8   13.45 )-----------( 303      ( 05 Nov 2019 13:30 )             47.2       11-05    128<L>  |  81<L>  |  54<H>  ----------------------------<  445<H>  5.4<H>   |  32<H>  |  1.83<H>    Ca    9.4      05 Nov 2019 13:30    TPro  7.8  /  Alb  3.0<L>  /  TBili  0.7  /  DBili  x   /  AST  34  /  ALT  7<L>  /  AlkPhos  91  11-05                      Lactate Trend            CAPILLARY BLOOD GLUCOSE

## 2019-11-05 NOTE — ED PROVIDER NOTE - NS ED ROS FT
GENERAL: No fever or chills, //             EYES: no change in vision, //             HEENT: no trouble swallowing or speaking, //             CARDIAC:  chest pain w/ coughing, //              PULMONARY: SOB, //             GI: no abdominal pain, no nausea or no vomiting, no diarrhea or constipation, //             : No changes in urination,  //            SKIN: no rashes,  //            NEURO: no headache,  //             MSK: No joint pain otherwise as HPI or negative. ~Harshal Colon DO PGY2

## 2019-11-05 NOTE — H&P ADULT - ASSESSMENT
65 y/o M with PMH of CAD s/p stents, HFrEF (EF 40% 12/2018), DMT2, HTN, CKD, NSTEMI, Afib (on Pradaxa). Presents to ED with worsening SOB and productive cough with .   pt  recently hospitalized last month and found to have small R hydropneumothorax of unclear etiology. Plans previous admission for drainage of effusion and sampling of fluid and analysis, but patient refused at that time and agreed for outpt f/u. presents now w/ sob       Problem: Hydropneumothorax.   CT from previous admission with R sided hydropneumothorax of unclear etiology.  -Pt refused drainage at that time.   CT chest non contrast.  ir drainage  hold pradaxa       ·  Problem: CHF (congestive heart failure).  Recommendation: -Keep O>I as tolerated.   c/w current meds  cards eval    dm   fsg riss  c/w lantus      dm neuropathy  c/w lyrica  oxycodone for severe pain

## 2019-11-05 NOTE — ED ADULT NURSE REASSESSMENT NOTE - NS ED NURSE REASSESS COMMENT FT1
As per Keo EL, CT performed and pt RTM tele. Cardiac monitor noted afib. Blood glucose 445 from bloodwork; Darlene EL notified.

## 2019-11-05 NOTE — ED PROVIDER NOTE - OBJECTIVE STATEMENT
65 yo m pmh htn, hld, afib, cad, pw sob. recent stay at [facility] from 10/16-19/19 for sob, pleural effusions, hydropneumothorax. pt DC because of his sons wedding, returned now because wedding over. reports sx are progressively getting worse. reports sob on exertion. cp w/ coughing. denies n/v, f/c.

## 2019-11-05 NOTE — ED ADULT NURSE NOTE - INTERVENTIONS DEFINITIONS
Stretcher in lowest position, wheels locked, appropriate side rails in place/[place] to call system/Non-slip footwear when patient is off stretcher/Physically safe environment: no spills, clutter or unnecessary equipment/Call bell, personal items and telephone within reach/Instruct patient to call for assistance

## 2019-11-06 PROBLEM — G62.9 POLYNEUROPATHY, UNSPECIFIED: Chronic | Status: ACTIVE | Noted: 2019-11-05

## 2019-11-06 LAB
ALBUMIN SERPL ELPH-MCNC: 2.8 G/DL — LOW (ref 3.3–5)
ALP SERPL-CCNC: 85 U/L — SIGNIFICANT CHANGE UP (ref 40–120)
ALT FLD-CCNC: <5 U/L — LOW (ref 10–45)
ANION GAP SERPL CALC-SCNC: 14 MMOL/L — SIGNIFICANT CHANGE UP (ref 5–17)
AST SERPL-CCNC: 8 U/L — LOW (ref 10–40)
BILIRUB SERPL-MCNC: 0.6 MG/DL — SIGNIFICANT CHANGE UP (ref 0.2–1.2)
BUN SERPL-MCNC: 61 MG/DL — HIGH (ref 7–23)
CALCIUM SERPL-MCNC: 8.9 MG/DL — SIGNIFICANT CHANGE UP (ref 8.4–10.5)
CHLORIDE SERPL-SCNC: 84 MMOL/L — LOW (ref 96–108)
CO2 SERPL-SCNC: 34 MMOL/L — HIGH (ref 22–31)
CREAT ?TM UR-MCNC: 76 MG/DL — SIGNIFICANT CHANGE UP
CREAT SERPL-MCNC: 2.02 MG/DL — HIGH (ref 0.5–1.3)
GLUCOSE BLDC GLUCOMTR-MCNC: 272 MG/DL — HIGH (ref 70–99)
GLUCOSE BLDC GLUCOMTR-MCNC: 331 MG/DL — HIGH (ref 70–99)
GLUCOSE BLDC GLUCOMTR-MCNC: 352 MG/DL — HIGH (ref 70–99)
GLUCOSE BLDC GLUCOMTR-MCNC: 394 MG/DL — HIGH (ref 70–99)
GLUCOSE SERPL-MCNC: 409 MG/DL — HIGH (ref 70–99)
HCT VFR BLD CALC: 50.3 % — HIGH (ref 39–50)
HGB BLD-MCNC: 15.6 G/DL — SIGNIFICANT CHANGE UP (ref 13–17)
MCHC RBC-ENTMCNC: 25.5 PG — LOW (ref 27–34)
MCHC RBC-ENTMCNC: 31 GM/DL — LOW (ref 32–36)
MCV RBC AUTO: 82.2 FL — SIGNIFICANT CHANGE UP (ref 80–100)
PLATELET # BLD AUTO: 236 K/UL — SIGNIFICANT CHANGE UP (ref 150–400)
POTASSIUM SERPL-MCNC: 3.2 MMOL/L — LOW (ref 3.5–5.3)
POTASSIUM SERPL-SCNC: 3.2 MMOL/L — LOW (ref 3.5–5.3)
PROT ?TM UR-MCNC: 134 MG/DL — HIGH (ref 0–12)
PROT SERPL-MCNC: 6.8 G/DL — SIGNIFICANT CHANGE UP (ref 6–8.3)
PROT/CREAT UR-RTO: 1.8 RATIO — HIGH (ref 0–0.2)
RBC # BLD: 6.12 M/UL — HIGH (ref 4.2–5.8)
RBC # FLD: 16.2 % — HIGH (ref 10.3–14.5)
SODIUM SERPL-SCNC: 132 MMOL/L — LOW (ref 135–145)
WBC # BLD: 9.33 K/UL — SIGNIFICANT CHANGE UP (ref 3.8–10.5)
WBC # FLD AUTO: 9.33 K/UL — SIGNIFICANT CHANGE UP (ref 3.8–10.5)

## 2019-11-06 PROCEDURE — 99223 1ST HOSP IP/OBS HIGH 75: CPT | Mod: GC

## 2019-11-06 PROCEDURE — 99231 SBSQ HOSP IP/OBS SF/LOW 25: CPT

## 2019-11-06 RX ORDER — INSULIN GLARGINE 100 [IU]/ML
25 INJECTION, SOLUTION SUBCUTANEOUS AT BEDTIME
Refills: 0 | Status: DISCONTINUED | OUTPATIENT
Start: 2019-11-06 | End: 2019-11-06

## 2019-11-06 RX ORDER — INSULIN LISPRO 100/ML
10 VIAL (ML) SUBCUTANEOUS
Refills: 0 | Status: DISCONTINUED | OUTPATIENT
Start: 2019-11-06 | End: 2019-11-07

## 2019-11-06 RX ORDER — INSULIN GLARGINE 100 [IU]/ML
30 INJECTION, SOLUTION SUBCUTANEOUS AT BEDTIME
Refills: 0 | Status: DISCONTINUED | OUTPATIENT
Start: 2019-11-06 | End: 2019-11-07

## 2019-11-06 RX ORDER — POTASSIUM CHLORIDE 20 MEQ
40 PACKET (EA) ORAL ONCE
Refills: 0 | Status: COMPLETED | OUTPATIENT
Start: 2019-11-06 | End: 2019-11-06

## 2019-11-06 RX ORDER — INSULIN LISPRO 100/ML
VIAL (ML) SUBCUTANEOUS AT BEDTIME
Refills: 0 | Status: DISCONTINUED | OUTPATIENT
Start: 2019-11-06 | End: 2019-11-17

## 2019-11-06 RX ADMIN — Medication 6: at 17:12

## 2019-11-06 RX ADMIN — Medication 10 UNIT(S): at 17:12

## 2019-11-06 RX ADMIN — INSULIN GLARGINE 30 UNIT(S): 100 INJECTION, SOLUTION SUBCUTANEOUS at 21:13

## 2019-11-06 RX ADMIN — Medication 4: at 21:13

## 2019-11-06 RX ADMIN — Medication 40 MILLIEQUIVALENT(S): at 12:58

## 2019-11-06 RX ADMIN — OXYCODONE HYDROCHLORIDE 5 MILLIGRAM(S): 5 TABLET ORAL at 10:40

## 2019-11-06 RX ADMIN — Medication 100 MILLIGRAM(S): at 17:11

## 2019-11-06 RX ADMIN — Medication 10: at 12:59

## 2019-11-06 RX ADMIN — Medication 81 MILLIGRAM(S): at 12:58

## 2019-11-06 RX ADMIN — HEPARIN SODIUM 5000 UNIT(S): 5000 INJECTION INTRAVENOUS; SUBCUTANEOUS at 17:42

## 2019-11-06 RX ADMIN — Medication 100 MILLIGRAM(S): at 05:10

## 2019-11-06 RX ADMIN — OXYCODONE HYDROCHLORIDE 5 MILLIGRAM(S): 5 TABLET ORAL at 09:39

## 2019-11-06 RX ADMIN — OXYCODONE HYDROCHLORIDE 5 MILLIGRAM(S): 5 TABLET ORAL at 22:47

## 2019-11-06 RX ADMIN — BUMETANIDE 2 MILLIGRAM(S): 0.25 INJECTION INTRAMUSCULAR; INTRAVENOUS at 05:57

## 2019-11-06 RX ADMIN — Medication 10: at 08:00

## 2019-11-06 RX ADMIN — Medication 100 MILLIGRAM(S): at 05:57

## 2019-11-06 RX ADMIN — HEPARIN SODIUM 5000 UNIT(S): 5000 INJECTION INTRAVENOUS; SUBCUTANEOUS at 05:11

## 2019-11-06 RX ADMIN — OXYCODONE HYDROCHLORIDE 5 MILLIGRAM(S): 5 TABLET ORAL at 21:13

## 2019-11-06 NOTE — PROGRESS NOTE ADULT - PROBLEM SELECTOR PLAN 1
CT chest with increase in R pl effusion with compressive atelectasis   -Previously noted hydropneumothorax on CT from 10/16, not present on current CT  -Plan for R thoracentesis tomorrow in IR for therapeutic and diagnostic. Send fluid studies, culture, cyto.  -Send LDH and CMP with AM labs  -Continue to hold Pradaxa for procedure  -Keep sats >92% with supplemental O2 as needed CT chest with increase in R pl effusion with compressive atelectasis   -Previously noted hydropneumothorax on CT from 10/16, not present on current CT  -Plan for R thoracentesis tomorrow in IR for diagnostic thoracentesis. Send fluid studies, culture, cyto.  -Send LDH and CMP with AM labs  -Continue to hold Pradaxa for procedure  -Keep sats >92% with supplemental O2 as needed

## 2019-11-06 NOTE — PROGRESS NOTE ADULT - ASSESSMENT
63 y/o M with PMH of CAD s/p stents, HFrEF (EF 40% 12/2018), DMT2, HTN, CKD, NSTEMI, Afib (on Pradaxa). Presents to ED with worsening SOB and productive cough with clear/whitish sputum for the past week. He was recently hospitalized last month and found to have small R hydropneumothorax of unclear etiology. Plans previous admission for drainage of effusion and sampling of fluid and analysis, but patient refused at that time, as he watned to go to his son's wedding. He did not present for outpt visit. CT chest with increase in R pl effusion. Currently on RA, sats 94%.

## 2019-11-06 NOTE — PROGRESS NOTE ADULT - ASSESSMENT
64 year old male with Hypertension, Hyperlipidemia, Atrial Fibrillation, s/p DCCV, Coronary Artery Disease, NSTEMI, (2/2014), s/p PCI to mid LAD, Proximal LCx, Distal LCx (2/14, [facility], LULU), Congestive Heart Failure, Normal Ejection Fraction, Diabetes Mellitus, CVA, Severe Left Internal Carotid Disease, s/p CEA (2/2014) presenting with worsening SOB, cough.     1.Right-sided Pleural effusion  Chest CT reveals interval increase in pleural effusion w no evidence of hydropneumothorax  cv status remains stable no evidence of ADHF on exam   pulm eval appreciated, plan for thoracentesis tomorrow     2. Chronic Congestive heart failure, Diastolic.   minimal LE edema, no evidence of ADHF on exam   continue current diuresis regimen   recent echo with no evidence of pericardial effusion, mod LAE, grossly borderline LV sys dysfx EF 45% (reported LVEF in last study is  slightly lower, but atrial fibrillation makes it difficult to assess ejection fraction. no wall motion abnl     3. Atrial Fibrillation, hx  c/w metoprolol succinate ER 100mg daily  hold pradaxa 150mg BID for poss drainage     4. Coronary Artery Disease. S/P PCI to LAD, LCx.  cv stable w/o chest pain  EKG with no evidence of ACS   Continue ASA 81mg and statin    dvtt ppx

## 2019-11-06 NOTE — PROGRESS NOTE ADULT - SUBJECTIVE AND OBJECTIVE BOX
Follow-up Pulm Progress Note    Less SOB today  Sats 94% RA    Medications:  MEDICATIONS  (STANDING):  aspirin enteric coated 81 milliGRAM(s) Oral daily  buMETAnide 2 milliGRAM(s) Oral daily  dextrose 5%. 1000 milliLiter(s) (50 mL/Hr) IV Continuous <Continuous>  dextrose 50% Injectable 12.5 Gram(s) IV Push once  dextrose 50% Injectable 25 Gram(s) IV Push once  dextrose 50% Injectable 25 Gram(s) IV Push once  heparin  Injectable 5000 Unit(s) SubCutaneous every 12 hours  influenza   Vaccine 0.5 milliLiter(s) IntraMuscular once  insulin glargine Injectable (LANTUS) 25 Unit(s) SubCutaneous at bedtime  insulin lispro (HumaLOG) corrective regimen sliding scale   SubCutaneous three times a day before meals  metolazone 5 milliGRAM(s) Oral daily  metoprolol succinate  milliGRAM(s) Oral daily  pregabalin 100 milliGRAM(s) Oral two times a day    MEDICATIONS  (PRN):  acetaminophen   Tablet .. 650 milliGRAM(s) Oral every 4 hours PRN Moderate Pain (4 - 6)  dextrose 40% Gel 15 Gram(s) Oral once PRN Blood Glucose LESS THAN 70 milliGRAM(s)/deciliter  glucagon  Injectable 1 milliGRAM(s) IntraMuscular once PRN Glucose LESS THAN 70 milligrams/deciliter  oxyCODONE    IR 5 milliGRAM(s) Oral two times a day PRN Severe Pain (7 - 10)          Vital Signs Last 24 Hrs  T(C): 36.5 (06 Nov 2019 12:03), Max: 36.8 (05 Nov 2019 20:40)  T(F): 97.7 (06 Nov 2019 12:03), Max: 98.2 (05 Nov 2019 20:40)  HR: 67 (06 Nov 2019 12:03) (62 - 103)  BP: 96/68 (06 Nov 2019 12:03) (96/68 - 140/73)  BP(mean): --  RR: 18 (06 Nov 2019 12:03) (18 - 18)  SpO2: 95% (06 Nov 2019 12:03) (95% - 98%)          11-05 @ 07:01  -  11-06 @ 07:00  --------------------------------------------------------  IN: 240 mL / OUT: 1850 mL / NET: -1610 mL          LABS:                        15.6   9.33  )-----------( 236      ( 06 Nov 2019 09:13 )             50.3     11-06    132<L>  |  84<L>  |  61<H>  ----------------------------<  409<H>  3.2<L>   |  34<H>  |  2.02<H>    Ca    8.9      06 Nov 2019 06:26    TPro  6.8  /  Alb  2.8<L>  /  TBili  0.6  /  DBili  x   /  AST  8<L>  /  ALT  <5<L>  /  AlkPhos  85  11-06          CAPILLARY BLOOD GLUCOSE      POCT Blood Glucose.: 394 mg/dL (06 Nov 2019 12:57)          Serum Pro-Brain Natriuretic Peptide: 1310 pg/mL (11-05-19 @ 13:30)            Physical Examination:  PULM: diminished BS R base  CVS: irreg irreg    RADIOLOGY REVIEWED  CT chest:     FINDINGS:     Secretions in the upper trachea.  Interval increase in size of partially   loculated moderate right pleural effusion with near complete compressive   atelectasis of the right lower lobe with interval progression. Suggestion   of right pleural thickening although evaluation of the pleural surface is   limited due to lack of intravenous contrast. Areas of compressive or   rounded atelectasis involving the lateral segment of the right middle   lobe. Lingular and left lower lobe subsegmental atelectasis.  Interval   resolution of left pleural effusion. Scattered bilateral calcified   granulomas. No pneumothorax.    Chronic elevation of the left hemidiaphragm.    Prominent right supraclavicular lymph node as seen in 2017. No axillary   or mediastinal lymphadenopathy.    The heart size is enlarged, particularly left atrium. No pericardial   effusion. Coronary artery and aortic valvular calcifications.   Atherosclerotic calcifications of the aorta. Main pulmonary artery is   dilated measuring up to 3.5 cm at the level of bifurcation which can be   seen in the setting of pulmonary hypertension.    Imagedportions of the upper abdomen is unremarkable.    Thoracic kyphosis with degenerative changes.    IMPRESSION:     Interval increase in size of partially loculated moderate right pleural   effusion with compressive atelectasis of a large portion of the right   lower lobe with interval progression. No pneumothorax.    Evaluation of the right pleural surface is limited due to lack of   intravenous contrast.

## 2019-11-06 NOTE — PROGRESS NOTE ADULT - SUBJECTIVE AND OBJECTIVE BOX
Interventional Radiology     64y Male with PMH as below with right loculated moderate pleural effusion referred to IR for image-guided thoracentesis and possible pigtail chest tube placement.      < from: CT Chest No Cont (11.05.19 @ 14:42) >  IMPRESSION:     Interval increase in size of partially loculated moderate right pleural   effusion with compressive atelectasis of a large portion of the right   lower lobe with interval progression. No pneumothorax.    < end of copied text >      PAST MEDICAL & SURGICAL HISTORY:  Neuropathy  CAD (coronary artery disease)  MI (myocardial infarction): circa 2014  Atrial fibrillation  TIA (transient ischemic attack)  DM type 2 (diabetes mellitus, type 2)  HLD (hyperlipidemia)  HTN (hypertension), benign  S/P carotid endarterectomy: left  Status post angioplasty with stent: LULU x 3 2/7/2014    Allergies  No Known Allergies    Labs:                        15.6   9.33  )-----------( 236      ( 06 Nov 2019 09:13 )             50.3     11-06    132<L>  |  84<L>  |  61<H>  ----------------------------<  409<H>  3.2<L>   |  34<H>  |  2.02<H>    Ca    8.9      06 Nov 2019 06:26    TPro  6.8  /  Alb  2.8<L>  /  TBili  0.6  /  DBili  x   /  AST  8<L>  /  ALT  <5<L>  /  AlkPhos  85  11-06    Comprehensive Metabolic Panel in AM (11.06.19 @ 06:26)    eGFR if Non : 34: Interpretative comment    eGFR if : 40 mL/min/1.73M2        Activated Partial Thromboplastin Time (10.16.19 @ 10:41)    Activated Partial Thromboplastin Time: 41.5:   Prothrombin Time and INR, Plasma (10.16.19 @ 10:41)    Prothrombin Time, Plasma: 13.7:     INR: 1.19:     Pt reported that his last dose of Pradaxa was on 11/5/19 around 8 AM.  Case d/w IR attending Dr. Dimitrios Lucero and since the patient's eGFR is 34 he should have Pradaxa held for 72 hours prior to the procedure.  The procedure has been postponed to Friday 11/8/19 after 8 AM.  This was d/w primary team NP Laila Almeida.      After risks, benefits, alternatives discussion the pt verbalized understanding and signed informed consent.  Will plan for image-guided right thoracentesis and possible right chest tube placement.     Wicho Bland RPA-C  [facility] [id number]  Ext [phone] Interventional Radiology     64y Male with PMH as below with right loculated moderate pleural effusion referred to IR for image-guided thoracentesis and possible pigtail chest tube placement.      < from: CT Chest No Cont (11.05.19 @ 14:42) >  IMPRESSION:     Interval increase in size of partially loculated moderate right pleural   effusion with compressive atelectasis of a large portion of the right   lower lobe with interval progression. No pneumothorax.    < end of copied text >      PAST MEDICAL & SURGICAL HISTORY:  Neuropathy  CAD (coronary artery disease)  MI (myocardial infarction): circa 2014  Atrial fibrillation  TIA (transient ischemic attack)  DM type 2 (diabetes mellitus, type 2)  HLD (hyperlipidemia)  HTN (hypertension), benign  S/P carotid endarterectomy: left  Status post angioplasty with stent: LULU x 3 2/7/2014    Allergies  No Known Allergies    Labs:                        15.6   9.33  )-----------( 236      ( 06 Nov 2019 09:13 )             50.3     11-06    132<L>  |  84<L>  |  61<H>  ----------------------------<  409<H>  3.2<L>   |  34<H>  |  2.02<H>    Ca    8.9      06 Nov 2019 06:26    TPro  6.8  /  Alb  2.8<L>  /  TBili  0.6  /  DBili  x   /  AST  8<L>  /  ALT  <5<L>  /  AlkPhos  85  11-06    Comprehensive Metabolic Panel in AM (11.06.19 @ 06:26)    eGFR if Non : 34: Interpretative comment    eGFR if : 40 mL/min/1.73M2    Activated Partial Thromboplastin Time (10.16.19 @ 10:41)    Activated Partial Thromboplastin Time: 41.5:   Prothrombin Time and INR, Plasma (10.16.19 @ 10:41)    Prothrombin Time, Plasma: 13.7:     INR: 1.19:     Pt reported that his last dose of Pradaxa was on 11/5/19 around 8 AM.  Case d/w IR attending Dr. Dimitrios Lucero and since the patient's eGFR is 34 he should have Pradaxa held for 72 hours prior to the procedure.  The procedure has been postponed to Friday 11/8/19 after 8 AM.  This was d/w primary team NP Laila Ram.      After risks, benefits, alternatives discussion the pt verbalized understanding and signed informed consent.  Will plan for image-guided right thoracentesis and possible right chest tube placement.     Wicho Bland RPA-MARIELLE  [facility] [id number]  Ext [phone]

## 2019-11-06 NOTE — CONSULT NOTE ADULT - ASSESSMENT
uncontrolled DM2 c/b neuropathy, CKD2, macrovascular disease (CAD and CVA)  10/2019 A1c 10.3%, goal A1c ~7  FS goal 100-180, above goal   FS premeal and qhs  carb consistent diet  Recommend lantus 25 units qhs   Recommend Humalog 10 units premeal (hold if NPO)   Recommend mod premeal and qhs sliding scale   Patient to follow up with Endocrine on discharge  Continue Basal/bolus, dose to be determined    HTN   goal 130/80, at goal   management per primary team     HLD  Consider fasting lipid panel   goal LDL <70  Continue statin 65 yo M with history of uncontrolled DM2 c/b neuropathy, CKD3, CAD, TIA,  Hypertension, Hyperlipidemia, Atrial Fibrillation, Congestive Heart Failure, Severe Left Internal Carotid Disease, s/p CEA (2/2014) presenting with SOB and cough secondary to right hydropneumothorax. Endocrine consult requested for management of hyperglycemia in patient with DM2.     uncontrolled DM2 c/b neuropathy, CKD3, macrovascular disease (CAD and CVA)  10/2019 A1c 10.3%, goal A1c ~7  FS goal 100-180, above goal   FS premeal and qhs  carb consistent diet  Recommend Lantus 25 units qhs   Recommend Humalog 10 units premeal (hold if NPO)   Recommend mod premeal and qhs sliding scale   Patient to follow up with Endocrinologist, Dr Real, on discharge  Continue Basal/bolus, dose to be determined    HTN   goal 130/80, at goal   management per primary team     HLD  Consider fasting lipid panel   goal LDL <70  Continue statin 65 yo M with history of uncontrolled DM2 c/b neuropathy, CKD3, CAD, TIA,  Hypertension, Hyperlipidemia, Atrial Fibrillation, Congestive Heart Failure, Severe Left Internal Carotid Disease, s/p CEA (2/2014) presenting with SOB and cough secondary to right hydropneumothorax. Endocrine consult requested for management of hyperglycemia in patient with DM2.     uncontrolled DM2 c/b neuropathy, CKD3, macrovascular disease (CAD and CVA)  10/2019 A1c 10.3%, goal A1c ~7  FS goal 100-180, above goal   FS premeal and qhs  carb consistent diet  Recommend Lantus 30 units qhs   Recommend Humalog 10 units premeal (hold if NPO)   Recommend mod premeal and qhs sliding scale   Patient to follow up with Endocrinologist, Dr Real, on discharge  Continue Basal/bolus, dose to be determined    HTN   goal 130/80, at goal   management per primary team     HLD  Consider fasting lipid panel   goal LDL <70  Continue statin 63 yo M with history of uncontrolled DM2 c/b neuropathy, CKD3, CAD, TIA,  Hypertension, Hyperlipidemia, Atrial Fibrillation, Congestive Heart Failure, Severe Left Internal Carotid Disease, s/p CEA (2/2014) presenting with SOB and cough secondary to right hydropneumothorax. Endocrine consult requested for management of hyperglycemia in patient with DM2 A1c >10% (high risk patient with high level decision-making).     uncontrolled DM2 c/b neuropathy, CKD3, macrovascular disease (CAD and CVA)  10/2019 A1c 10.3%, goal A1c ~7  FS goal 100-180, above goal   FS premeal and qhs  carb consistent diet  Recommend Lantus 30 units qhs   Recommend Humalog 10 units premeal (hold if NPO)   Recommend mod premeal and qhs sliding scale   Patient to follow up with Endocrinologist, Dr Real, on discharge  Continue Basal/bolus, dose to be determined    HTN   goal 130/80, at goal   management per primary team     HLD  Consider fasting lipid panel   goal LDL <70  Continue statin 63 yo M with history of uncontrolled DM2 c/b neuropathy, CKD3, CAD, TIA,  Hypertension, Hyperlipidemia, Atrial Fibrillation, Congestive Heart Failure, Severe Left Internal Carotid Disease, s/p CEA (2/2014) presenting with SOB and cough secondary to right hydropneumothorax. Endocrine consult requested for management of hyperglycemia in patient with DM2 A1c >10% (high risk patient with high level decision-making).     uncontrolled DM2 c/b neuropathy, CKD3, macrovascular disease (CAD and CVA)  10/2019 A1c 10.3%, goal A1c ~7  FS goal 100-180, above goal   FS premeal and qhs  carb consistent diet  Recommend Lantus 30 units qhs   Recommend Humalog 10 units premeal (hold if NPO)   Recommend mod premeal and qhs sliding scale   Patient to follow up with Endocrinologist, Dr Real, on discharge  Continue Basal/bolus, dose to be determined  Patient to follow up with  ophthalmology as outpt     HTN   goal 130/80, at goal   management per primary team     HLD  Consider fasting lipid panel   goal LDL <70  Continue statin

## 2019-11-06 NOTE — PROGRESS NOTE ADULT - ASSESSMENT
65 y/o M with PMH of CAD s/p stents, HFrEF (EF 40% 12/2018), DMT2, HTN, CKD, NSTEMI, Afib (on Pradaxa). Presents to ED with worsening SOB and productive cough with .   pt  recently hospitalized last month and found to have small R hydropneumothorax of unclear etiology. Plans previous admission for drainage of effusion and sampling of fluid and analysis, but patient refused at that time and agreed for outpt f/u. presents now w/ sob       Problem: Hydropneumothorax.   CT from previous admission with R sided hydropneumothorax of unclear etiology.  -Pt refused drainage at that time.  ir drainage tomorrow   hold pradaxa       ·  Problem: CHF (congestive heart failure).  Recommendation: -Keep O>I as tolerated.   c/w current meds  cards eval noted    dm /uncontrolled  endo eval  fsg riss  c/w lantus/riss for now        dm neuropathy  c/w lyrica  oxycodone for severe pain

## 2019-11-06 NOTE — PROVIDER CONTACT NOTE (OTHER) - BACKGROUND
pt admitted to the  hospital with dyspnoea, h/o of A fib, pradexa on hold for pleural tap in the am, on heparin s/c q8h

## 2019-11-06 NOTE — PROGRESS NOTE ADULT - SUBJECTIVE AND OBJECTIVE BOX
CHIEF COMPLAINT:Patient is a 64y old  Male who presents with a chief complaint of   	        PAST MEDICAL & SURGICAL HISTORY:  Neuropathy  CAD (coronary artery disease)  MI (myocardial infarction): circa 2014  Atrial fibrillation  TIA (transient ischemic attack)  DM type 2 (diabetes mellitus, type 2)  HLD (hyperlipidemia)  HTN (hypertension), benign  S/P carotid endarterectomy: left  Status post angioplasty with stent: LULU x 3 2/7/2014          REVIEW OF SYSTEMS:  CONSTITUTIONAL: No fever, weight loss, or fatigue  EYES: No eye pain, visual disturbances, or discharge  NECK: No pain or stiffness  RESPIRATORY: No cough, wheezing, chills or hemoptysis; No Shortness of Breath  CARDIOVASCULAR: No chest pain, palpitations, passing out, dizziness, or leg swelling  GASTROINTESTINAL: No abdominal or epigastric pain. No nausea, vomiting, or hematemesis; No diarrhea or constipation. No melena or hematochezia.  GENITOURINARY: No dysuria, frequency, hematuria, or incontinence  NEUROLOGICAL: No headaches, memory loss, loss of strength, numbness, or tremors  SKIN: No itching, burning, rashes, or lesions   LYMPH Nodes: No enlarged glands  ENDOCRINE: No heat or cold intolerance; No hair loss  MUSCULOSKELETAL: No joint pain or swelling; No muscle, back, or extremity pain    Medications:  MEDICATIONS  (STANDING):  aspirin enteric coated 81 milliGRAM(s) Oral daily  buMETAnide 2 milliGRAM(s) Oral daily  dextrose 5%. 1000 milliLiter(s) (50 mL/Hr) IV Continuous <Continuous>  dextrose 50% Injectable 12.5 Gram(s) IV Push once  dextrose 50% Injectable 25 Gram(s) IV Push once  dextrose 50% Injectable 25 Gram(s) IV Push once  heparin  Injectable 5000 Unit(s) SubCutaneous every 12 hours  influenza   Vaccine 0.5 milliLiter(s) IntraMuscular once  insulin glargine Injectable (LANTUS) 20 Unit(s) SubCutaneous at bedtime  insulin lispro (HumaLOG) corrective regimen sliding scale   SubCutaneous three times a day before meals  metolazone 5 milliGRAM(s) Oral daily  metoprolol succinate  milliGRAM(s) Oral daily  pregabalin 100 milliGRAM(s) Oral two times a day    MEDICATIONS  (PRN):  acetaminophen   Tablet .. 650 milliGRAM(s) Oral every 4 hours PRN Moderate Pain (4 - 6)  dextrose 40% Gel 15 Gram(s) Oral once PRN Blood Glucose LESS THAN 70 milliGRAM(s)/deciliter  glucagon  Injectable 1 milliGRAM(s) IntraMuscular once PRN Glucose LESS THAN 70 milligrams/deciliter  oxyCODONE    IR 5 milliGRAM(s) Oral two times a day PRN Severe Pain (7 - 10)    	    PHYSICAL EXAM:  T(C): 36.6 (11-06-19 @ 04:22), Max: 36.8 (11-05-19 @ 20:40)  HR: 92 (11-06-19 @ 04:22) (62 - 103)  BP: 105/66 (11-06-19 @ 04:22) (105/66 - 132/72)  RR: 18 (11-06-19 @ 04:22) (18 - 22)  SpO2: 95% (11-06-19 @ 04:22) (93% - 98%)  Wt(kg): --  I&O's Summary    05 Nov 2019 07:01  -  06 Nov 2019 07:00  --------------------------------------------------------  IN: 0 mL / OUT: 1500 mL / NET: -1500 mL        Appearance: Normal	  HEENT:   Normal oral mucosa, PERRL, EOMI	  Lymphatic: No lymphadenopathy  Cardiovascular: Normal S1 S2, No JVD, No murmurs, No edema  Respiratory: Lungs clear to auscultation	  Psychiatry: A & O x 3, Mood & affect appropriate  Gastrointestinal:  Soft, Non-tender, + BS	  Skin: No rashes, No ecchymoses, No cyanosis	  Neurologic: Non-focal  Extremities: Normal range of motion, No clubbing, cyanosis or edema  Vascular: Peripheral pulses palpable 2+ bilaterally    TELEMETRY: 	    ECG:  	  RADIOLOGY:  OTHER: 	  	  LABS:	 	    CARDIAC MARKERS:                                14.8   13.45 )-----------( 303      ( 05 Nov 2019 13:30 )             47.2     11-06    132<L>  |  84<L>  |  61<H>  ----------------------------<  409<H>  3.2<L>   |  34<H>  |  2.02<H>    Ca    8.9      06 Nov 2019 06:26    TPro  6.8  /  Alb  2.8<L>  /  TBili  0.6  /  DBili  x   /  AST  8<L>  /  ALT  <5<L>  /  AlkPhos  85  11-06    proBNP: Serum Pro-Brain Natriuretic Peptide: 1310 pg/mL (11-05 @ 13:30)    Lipid Profile:   HgA1c:   TSH: CHIEF COMPLAINT:Patient is a 64y old  Male who presents with a chief complaint of   	        PAST MEDICAL & SURGICAL HISTORY:  Neuropathy  CAD (coronary artery disease)  MI (myocardial infarction): circa 2014  Atrial fibrillation  TIA (transient ischemic attack)  DM type 2 (diabetes mellitus, type 2)  HLD (hyperlipidemia)  HTN (hypertension), benign  S/P carotid endarterectomy: left  Status post angioplasty with stent: LULU x 3 2/7/2014          REVIEW OF SYSTEMS:  weak  EYES: No eye pain, visual disturbances, or discharge  NECK: No pain or stiffness  RESPIRATORY: No cough, wheezing, chills or hemoptysis;   peraza/sob  CARDIOVASCULAR: No chest pain, palpitations,   GASTROINTESTINAL: No abdominal or epigastric pain. No nausea, vomiting, or hematemesis;   GENITOURINARY: No dysuria, frequency, hematuria, or incontinence  NEUROLOGICAL: No headaches,       Medications:  MEDICATIONS  (STANDING):  aspirin enteric coated 81 milliGRAM(s) Oral daily  buMETAnide 2 milliGRAM(s) Oral daily  dextrose 5%. 1000 milliLiter(s) (50 mL/Hr) IV Continuous <Continuous>  dextrose 50% Injectable 12.5 Gram(s) IV Push once  dextrose 50% Injectable 25 Gram(s) IV Push once  dextrose 50% Injectable 25 Gram(s) IV Push once  heparin  Injectable 5000 Unit(s) SubCutaneous every 12 hours  influenza   Vaccine 0.5 milliLiter(s) IntraMuscular once  insulin glargine Injectable (LANTUS) 20 Unit(s) SubCutaneous at bedtime  insulin lispro (HumaLOG) corrective regimen sliding scale   SubCutaneous three times a day before meals  metolazone 5 milliGRAM(s) Oral daily  metoprolol succinate  milliGRAM(s) Oral daily  pregabalin 100 milliGRAM(s) Oral two times a day    MEDICATIONS  (PRN):  acetaminophen   Tablet .. 650 milliGRAM(s) Oral every 4 hours PRN Moderate Pain (4 - 6)  dextrose 40% Gel 15 Gram(s) Oral once PRN Blood Glucose LESS THAN 70 milliGRAM(s)/deciliter  glucagon  Injectable 1 milliGRAM(s) IntraMuscular once PRN Glucose LESS THAN 70 milligrams/deciliter  oxyCODONE    IR 5 milliGRAM(s) Oral two times a day PRN Severe Pain (7 - 10)    	    PHYSICAL EXAM:  T(C): 36.6 (11-06-19 @ 04:22), Max: 36.8 (11-05-19 @ 20:40)  HR: 92 (11-06-19 @ 04:22) (62 - 103)  BP: 105/66 (11-06-19 @ 04:22) (105/66 - 132/72)  RR: 18 (11-06-19 @ 04:22) (18 - 22)  SpO2: 95% (11-06-19 @ 04:22) (93% - 98%)  Wt(kg): --  I&O's Summary    05 Nov 2019 07:01  -  06 Nov 2019 07:00  --------------------------------------------------------  IN: 0 mL / OUT: 1500 mL / NET: -1500 mL        Appearance: Normal	  HEENT:   Normal oral mucosa, PERRL, EOMI	  Lymphatic: No lymphadenopathy  Cardiovascular: Normal S1 S2, No JVD,   Respiratory:dec  bs   Psychiatry: A & O x 3,   Gastrointestinal:  Soft, Non-tender, + BS	  Skin: No rashes, No ecchymoses, No cyanosis	  Neurologic: Non-focal  Extremities: pvd   Vascular: Peripheral pulses palpable    TELEMETRY: 	    ECG:  	  RADIOLOGY:  OTHER: 	  	  LABS:	 	    CARDIAC MARKERS:                                14.8   13.45 )-----------( 303      ( 05 Nov 2019 13:30 )             47.2     11-06    132<L>  |  84<L>  |  61<H>  ----------------------------<  409<H>  3.2<L>   |  34<H>  |  2.02<H>    Ca    8.9      06 Nov 2019 06:26    TPro  6.8  /  Alb  2.8<L>  /  TBili  0.6  /  DBili  x   /  AST  8<L>  /  ALT  <5<L>  /  AlkPhos  85  11-06    proBNP: Serum Pro-Brain Natriuretic Peptide: 1310 pg/mL (11-05 @ 13:30)    Lipid Profile:   HgA1c:   TSH:

## 2019-11-06 NOTE — CONSULT NOTE ADULT - SUBJECTIVE AND OBJECTIVE BOX
HPI: 65 yo M with history of uncontrolled DM2 c/b neuropathy, CKD3, CAD, TIA,  Hypertension, Hyperlipidemia, Atrial Fibrillation, Congestive Heart Failure, Severe Left Internal Carotid Disease, s/p CEA (2/2014) presenting with SOB and cough secondary to right hydropneumothorax. Endocrine consult requested for management of hyperglycemia in patient with DM2.     Endocrine:   Patient diagnosed with DM2 20 years ago.     PAST MEDICAL & SURGICAL HISTORY:  Neuropathy  CAD (coronary artery disease)  MI (myocardial infarction): circa 2014  Atrial fibrillation  TIA (transient ischemic attack)  DM type 2 (diabetes mellitus, type 2)  HLD (hyperlipidemia)  HTN (hypertension), benign  S/P carotid endarterectomy: left  Status post angioplasty with stent: LULU x 3 2/7/2014      FAMILY HISTORY:  Family history of heart disease    Social History:    Outpatient Medications:  · 	ammonium lactate 12% topical lotion: 1 application topically 2 times a day  · 	bumetanide 2 mg oral tablet: 1 tab(s) orally once a day  · 	aspirin 81 mg oral delayed release tablet: 1 tab(s) orally once a day  · 	dabigatran 150 mg oral capsule: 1 cap(s) orally 2 times a day  · 	metoprolol succinate 50 mg oral tablet, extended release: 2 tab(s) orally once a day (100 mg)  · 	Tylenol 325 mg oral tablet: 2 tab(s) orally every 4 hours, As Needed  · 	Lyrica 100 mg oral capsule: 1 cap(s) orally 2 times a day  · 	metOLazone 5 mg oral tablet: 1 tab(s) orally once a weeky  · 	Basaglar KwikPen 100 units/mL subcutaneous solution: 25 unit(s) subcutaneous once a day (at bedtime)  · 	NovoLOG FlexPen 100 units/mL injectable solution: unit(s) injectable 3 times a day (before meals) - Sliding Scale    MEDICATIONS  (STANDING):  aspirin enteric coated 81 milliGRAM(s) Oral daily  buMETAnide 2 milliGRAM(s) Oral daily  dextrose 5%. 1000 milliLiter(s) (50 mL/Hr) IV Continuous <Continuous>  dextrose 50% Injectable 12.5 Gram(s) IV Push once  dextrose 50% Injectable 25 Gram(s) IV Push once  dextrose 50% Injectable 25 Gram(s) IV Push once  heparin  Injectable 5000 Unit(s) SubCutaneous every 12 hours  influenza   Vaccine 0.5 milliLiter(s) IntraMuscular once  insulin glargine Injectable (LANTUS) 25 Unit(s) SubCutaneous at bedtime  insulin lispro (HumaLOG) corrective regimen sliding scale   SubCutaneous three times a day before meals  metolazone 5 milliGRAM(s) Oral daily  metoprolol succinate  milliGRAM(s) Oral daily  pregabalin 100 milliGRAM(s) Oral two times a day    MEDICATIONS  (PRN):  acetaminophen   Tablet .. 650 milliGRAM(s) Oral every 4 hours PRN Moderate Pain (4 - 6)  dextrose 40% Gel 15 Gram(s) Oral once PRN Blood Glucose LESS THAN 70 milliGRAM(s)/deciliter  glucagon  Injectable 1 milliGRAM(s) IntraMuscular once PRN Glucose LESS THAN 70 milligrams/deciliter  oxyCODONE    IR 5 milliGRAM(s) Oral two times a day PRN Severe Pain (7 - 10)    Allergies  No Known Allergies    Review of Systems:  Constitutional: No fever  Eyes: No blurry vision  Neuro: No tremors  HEENT: No pain  Cardiovascular: No chest pain, palpitations  Respiratory: No SOB, no cough  GI: No nausea, vomiting, abdominal pain  : No dysuria  Skin: no rash  Psych: no depression  Endocrine: no polyuria, polydipsia  Hem/lymph: no swelling  Osteoporosis: no fractures    ALL OTHER SYSTEMS REVIEWED AND NEGATIVE    PHYSICAL EXAM:  VITALS: T(C): 36.5 (11-06-19 @ 12:03)  T(F): 97.7 (11-06-19 @ 12:03), Max: 98.2 (11-05-19 @ 20:40)  HR: 67 (11-06-19 @ 12:03) (62 - 103)  BP: 96/68 (11-06-19 @ 12:03) (96/68 - 140/73)  RR:  (18 - 18)  SpO2:  (95% - 98%)  Wt(kg): 132kg   GENERAL: NAD, well-groomed, well-developed  EYES: No proptosis, no lid lag, anicteric  HEENT:  Atraumatic, Normocephalic, moist mucous membranes  THYROID: Normal size, no palpable nodules  RESPIRATORY: Clear to auscultation bilaterally; No rales, rhonchi, wheezing  CARDIOVASCULAR: Regular rate and rhythm; No murmurs; no peripheral edema  GI: Soft, nontender, non distended, normal bowel sounds  SKIN: Dry, intact, No rashes or lesions  MUSCULOSKELETAL: Full range of motion, normal strength  NEURO: sensation intact, extraocular movements intact, no tremor  PSYCH: Alert and oriented x 3, normal affect, normal mood  CUSHING'S SIGNS: no striae    POCT Blood Glucose.: 394 mg/dL (11-06-19 @ 12:57) H10   POCT Blood Glucose.: 352 mg/dL (11-06-19 @ 07:45) H10  POCT Blood Glucose.: 370 mg/dL (11-05-19 @ 21:02) L20  POCT Blood Glucose.: 248 mg/dL (11-05-19 @ 17:40) H4  POCT Blood Glucose.: 336 mg/dL (11-05-19 @ 15:15)                          15.6   9.33  )-----------( 236      ( 06 Nov 2019 09:13 )             50.3       11-06    132<L>  |  84<L>  |  61<H>  ----------------------------<  409<H>  3.2<L>   |  34<H>  |  2.02<H>    EGFR if : 40<L>  EGFR if non : 34<L>    Ca    8.9      11-06    TPro  6.8  /  Alb  2.8<L>  /  TBili  0.6  /  DBili  x   /  AST  8<L>  /  ALT  <5<L>  /  AlkPhos  85  11-06      Hemoglobin A1C, Whole Blood: 10.3 % <H> [4.0 - 5.6] (10-17-19 @ 07:43) HPI: 63 yo M with history of uncontrolled DM2 c/b neuropathy, CKD3, CAD, TIA,  Hypertension, Hyperlipidemia, Atrial Fibrillation, Congestive Heart Failure, Severe Left Internal Carotid Disease, s/p CEA (2/2014) presenting with SOB and cough secondary to right hydropneumothorax. Endocrine consult requested for management of hyperglycemia in patient with DM2.     Endocrine:   Patient diagnosed with DM2 20 years ago. Patient was initially started on Metformin, however discontinued due to CKD. Patient then started on Glimepiride which he took until 12/2018 when patient was started on basal/bolus insulin. Patient has been following with Dr Real, endocrinologist since last visit. Patient currently takes Lantus 25units qhs and Humalog 8-10units premeal. Patient states he checks his fingerstick before meals. AM fasting 180-240, before lunch 240-300, D 240-400. Patient states he rarely gets hypoglycemic events, the last being when he was in Dr Real's office. He experiences lightheadedness and tremors which improves with juice. Patient does not report changes in vision however has not seen eye doctor in over 1 year. Patient does report neuropathy and follows with podiatry. Reports history of DM in father.     Diet:  B: 1/2 bagel or english muffin   L: tuna salad, lentil soup   D : lentil or chicken noddle soup, pasta     PAST MEDICAL & SURGICAL HISTORY:  Neuropathy  CAD (coronary artery disease)  MI (myocardial infarction): circa 2014  Atrial fibrillation  TIA (transient ischemic attack)  DM type 2 (diabetes mellitus, type 2)  HLD (hyperlipidemia)  HTN (hypertension), benign  S/P carotid endarterectomy: left  Status post angioplasty with stent: LULU x 3 2/7/2014    FAMILY HISTORY:  History of DM in father     Social History:  Does not report tobacco use  Rarely drinks alcohol     Outpatient Medications:  · 	ammonium lactate 12% topical lotion: 1 application topically 2 times a day  · 	bumetanide 2 mg oral tablet: 1 tab(s) orally once a day  · 	aspirin 81 mg oral delayed release tablet: 1 tab(s) orally once a day  · 	dabigatran 150 mg oral capsule: 1 cap(s) orally 2 times a day  · 	metoprolol succinate 50 mg oral tablet, extended release: 2 tab(s) orally once a day (100 mg)  · 	Tylenol 325 mg oral tablet: 2 tab(s) orally every 4 hours, As Needed  · 	Lyrica 100 mg oral capsule: 1 cap(s) orally 2 times a day  · 	metOLazone 5 mg oral tablet: 1 tab(s) orally once a weeky  · 	Basaglar KwikPen 100 units/mL subcutaneous solution: 25 unit(s) subcutaneous once a day (at bedtime)  · 	NovoLOG FlexPen 100 units/mL injectable solution: unit(s) 8-10units premeal injectable 3 times a day (before meals) - Sliding Scale    MEDICATIONS  (STANDING):  aspirin enteric coated 81 milliGRAM(s) Oral daily  buMETAnide 2 milliGRAM(s) Oral daily  dextrose 5%. 1000 milliLiter(s) (50 mL/Hr) IV Continuous <Continuous>  dextrose 50% Injectable 12.5 Gram(s) IV Push once  dextrose 50% Injectable 25 Gram(s) IV Push once  dextrose 50% Injectable 25 Gram(s) IV Push once  heparin  Injectable 5000 Unit(s) SubCutaneous every 12 hours  influenza   Vaccine 0.5 milliLiter(s) IntraMuscular once  insulin glargine Injectable (LANTUS) 25 Unit(s) SubCutaneous at bedtime  insulin lispro (HumaLOG) corrective regimen sliding scale   SubCutaneous three times a day before meals  metolazone 5 milliGRAM(s) Oral daily  metoprolol succinate  milliGRAM(s) Oral daily  pregabalin 100 milliGRAM(s) Oral two times a day    MEDICATIONS  (PRN):  acetaminophen   Tablet .. 650 milliGRAM(s) Oral every 4 hours PRN Moderate Pain (4 - 6)  dextrose 40% Gel 15 Gram(s) Oral once PRN Blood Glucose LESS THAN 70 milliGRAM(s)/deciliter  glucagon  Injectable 1 milliGRAM(s) IntraMuscular once PRN Glucose LESS THAN 70 milligrams/deciliter  oxyCODONE    IR 5 milliGRAM(s) Oral two times a day PRN Severe Pain (7 - 10)    Allergies  No Known Allergies    Review of Systems:  Constitutional: No fever  Eyes: No blurry vision  Neuro: No tremors  HEENT: No pain  Cardiovascular: No chest pain, palpitations  Respiratory: + SOB, + cough  GI: No nausea, vomiting, abdominal pain  : No dysuria  Skin: no rash  Endocrine: no polyuria, polydipsia  Hem/lymph: no swelling    ALL OTHER SYSTEMS REVIEWED AND NEGATIVE    PHYSICAL EXAM:  VITALS: T(C): 36.5 (11-06-19 @ 12:03)  T(F): 97.7 (11-06-19 @ 12:03), Max: 98.2 (11-05-19 @ 20:40)  HR: 67 (11-06-19 @ 12:03) (62 - 103)  BP: 96/68 (11-06-19 @ 12:03) (96/68 - 140/73)  RR:  (18 - 18)  SpO2:  (95% - 98%)  Wt(kg): 132kg   GENERAL: NAD, well-groomed, obese male   EYES: No proptosis, anicteric  HEENT:  Atraumatic, Normocephalic, moist mucous membranes  THYROID: Normal size, no palpable nodules  RESPIRATORY: decrease breath sounds at base ; No rales, rhonchi, wheezing  CARDIOVASCULAR: Regular rate and rhythm; No murmurs; no peripheral edema  GI: Soft, nontender, non distended, normal bowel sounds  SKIN: dry skin on b/l LE   MUSCULOSKELETAL: Full range of motion, normal strength  NEURO: sensation intact, extraocular movements intact, no tremor  PSYCH: Alert and oriented x 3, reactive affect   CUSHING'S SIGNS: no striae    POCT Blood Glucose.: 394 mg/dL (11-06-19 @ 12:57) H10   POCT Blood Glucose.: 352 mg/dL (11-06-19 @ 07:45) H10  POCT Blood Glucose.: 370 mg/dL (11-05-19 @ 21:02) L20  POCT Blood Glucose.: 248 mg/dL (11-05-19 @ 17:40) H4  POCT Blood Glucose.: 336 mg/dL (11-05-19 @ 15:15)                          15.6   9.33  )-----------( 236      ( 06 Nov 2019 09:13 )             50.3       11-06    132<L>  |  84<L>  |  61<H>  ----------------------------<  409<H>  3.2<L>   |  34<H>  |  2.02<H>    EGFR if : 40<L>  EGFR if non : 34<L>    Ca    8.9      11-06    TPro  6.8  /  Alb  2.8<L>  /  TBili  0.6  /  DBili  x   /  AST  8<L>  /  ALT  <5<L>  /  AlkPhos  85  11-06      Hemoglobin A1C, Whole Blood: 10.3 % <H> [4.0 - 5.6] (10-17-19 @ 07:43)

## 2019-11-06 NOTE — PROVIDER CONTACT NOTE (OTHER) - ACTION/TREATMENT ORDERED:
Mr. Pearson is a pleasant 36 yom with morbid obesity (BMI 48.3) presenting with abdominal pain, polyuria and polydipsia, found to have DKA and new onset diabetes.     #Neuro  -A&Ox4, no issues, will continue to monitor    #Endo  DKA, likely T2DM, given age, BMI. HbA1C of 11.3  - no clear exacerbant as of yet. Lipase negative, no diarrhea, no URI or PNA  - VBG showed pH 7.24, HCO3 8, AG 26, lactate 1.4. beta hydroxy butyrate >8. large ketones in the urine.   - K4.1 after repletion, but will monitor BMP q4h  - s/p 4L LRs, and started on D5+NS at 150/hr  - BMP q4h -> AG 18, HCO3 9.   - Blood sugar now 198  - insulin gtt 8u -> 5u  - Replete K+ as needed.   - Endo consult in the AM    #Resp  -Lungs clear, will continue to monitor    #CV  -no cardiac issues  -cholesterol 160, HDL 23, LDL 77, .    #GI  Patient has symptoms of biliary colic, likely due to biliary sludge or stones  - diabetic clears for now, patient denies nausea and vomiting  - lipase negative, ALT 76.   - will f/u RUQ ultrasound to look for NAFLD or biliary issues.     #ID  -UA negative, no clear source of infection currently    #Renal/  -polyuria likely secondary to T2DM.   -will f/u BMP and monitor Is/Os    #Heme  -No white count, no issues    #DVT PPx  -low risk for DVT, encourage patient to walk and move around will continue to monitor

## 2019-11-06 NOTE — PROGRESS NOTE ADULT - SUBJECTIVE AND OBJECTIVE BOX
[facility]   [phone]  RENAL FOLLOW UP NOTE  --------------------------------------------------------------------------------  Chief Complaint:    24 hour events/subjective:    seen earlier. daughter at bedside. still with difficulty breathing overall    PAST HISTORY  --------------------------------------------------------------------------------  No significant changes to PMH, PSH, FHx, SHx, unless otherwise noted    ALLERGIES & MEDICATIONS  --------------------------------------------------------------------------------  Allergies    No Known Allergies    Intolerances      Standing Inpatient Medications  aspirin enteric coated 81 milliGRAM(s) Oral daily  buMETAnide 2 milliGRAM(s) Oral daily  dextrose 5%. 1000 milliLiter(s) IV Continuous <Continuous>  dextrose 50% Injectable 12.5 Gram(s) IV Push once  dextrose 50% Injectable 25 Gram(s) IV Push once  dextrose 50% Injectable 25 Gram(s) IV Push once  heparin  Injectable 5000 Unit(s) SubCutaneous every 12 hours  influenza   Vaccine 0.5 milliLiter(s) IntraMuscular once  insulin glargine Injectable (LANTUS) 30 Unit(s) SubCutaneous at bedtime  insulin lispro (HumaLOG) corrective regimen sliding scale   SubCutaneous three times a day before meals  insulin lispro (HumaLOG) corrective regimen sliding scale   SubCutaneous at bedtime  insulin lispro Injectable (HumaLOG) 10 Unit(s) SubCutaneous three times a day before meals  metolazone 5 milliGRAM(s) Oral daily  metoprolol succinate  milliGRAM(s) Oral daily  pregabalin 100 milliGRAM(s) Oral two times a day    PRN Inpatient Medications  acetaminophen   Tablet .. 650 milliGRAM(s) Oral every 4 hours PRN  dextrose 40% Gel 15 Gram(s) Oral once PRN  glucagon  Injectable 1 milliGRAM(s) IntraMuscular once PRN  oxyCODONE    IR 5 milliGRAM(s) Oral two times a day PRN      REVIEW OF SYSTEMS  --------------------------------------------------------------------------------    Gen: denies  fevers/chills,  CVS: denies chest pain/palpitations  Resp:  + SOB/Cough  GI: Denies N/V/Abd pain  : Denies dysuria/oliguria/hematuria    All other systems were reviewed and are negative, except as noted.    VITALS/PHYSICAL EXAM  --------------------------------------------------------------------------------  T(C): 36.7 (11-06-19 @ 20:25), Max: 37.1 (11-06-19 @ 18:17)  HR: 96 (11-06-19 @ 20:25) (66 - 96)  BP: 122/71 (11-06-19 @ 20:25) (96/68 - 140/73)  RR: 18 (11-06-19 @ 20:25) (18 - 18)  SpO2: 97% (11-06-19 @ 20:25) (95% - 98%)  Wt(kg): --  Height (cm): 181.61 (11-05-19 @ 12:09)  Weight (kg): 132.4 (11-05-19 @ 12:09)  BMI (kg/m2): 40.1 (11-05-19 @ 12:09)  BSA (m2): 2.49 (11-05-19 @ 12:09)      11-05-19 @ 07:01  -  11-06-19 @ 07:00  --------------------------------------------------------  IN: 240 mL / OUT: 1850 mL / NET: -1610 mL    11-06-19 @ 07:01  -  11-06-19 @ 22:18  --------------------------------------------------------  IN: 520 mL / OUT: 300 mL / NET: 220 mL      Physical Exam:  	  Gen:  + obese appears with mildly tachypneic  on O2    	no jvd   	Pulm: decrease bs  R base upto 3/4  no rales or ronchi or wheezing  	CV: RRR, S1S2; no rub  	Abd: +BS, soft, nontender/nondistended  	: No suprapubic tenderness  	UE: Warm, no cyanosis  no clubbing,  no edema  	LE: Warm, no cyanosis  no clubbing, no edema  	Neuro: alert and oriented. speech coherent   	Skin: Warm, no decrease skin turgor   	        LABS/STUDIES  --------------------------------------------------------------------------------              15.6   9.33  >-----------<  236      [11-06-19 @ 09:13]              50.3     132  |  84  |  61  ----------------------------<  409      [11-06-19 @ 06:26]  3.2   |  34  |  2.02        Ca     8.9     [11-06-19 @ 06:26]    TPro  6.8  /  Alb  2.8  /  TBili  0.6  /  DBili  x   /  AST  8   /  ALT  <5  /  AlkPhos  85  [11-06-19 @ 06:26]          Creatinine Trend:  SCr 2.02 [11-06 @ 06:26]  SCr 1.80 [11-05 @ 15:07]  SCr 1.83 [11-05 @ 13:30]  SCr 2.07 [10-19 @ 06:19]  SCr 1.94 [10-18 @ 07:01]              Urinalysis - [10-17-19 @ 04:40]      Color Light Yellow / Appearance Clear / SG 1.015 / pH 6.5      Gluc 500 mg/dL / Ketone Negative  / Bili Negative / Urobili Negative       Blood Small / Protein 300 mg/dL / Leuk Est Negative / Nitrite Negative      RBC 8 / WBC 3 / Hyaline 0 / Gran  / Sq Epi  / Non Sq Epi 0 / Bacteria Negative    Urine Creatinine 76      [11-06-19 @ 08:10]  Urine Protein 134      [11-06-19 @ 08:10]    HbA1c 10.3      [10-17-19 @ 07:43]  TSH 1.71      [12-28-18 @ 14:58]

## 2019-11-06 NOTE — PROGRESS NOTE ADULT - SUBJECTIVE AND OBJECTIVE BOX
CC: c/o mild dyspnea    TELEMETRY: afib     PHYSICAL EXAM:    T(C): 36.5 (11-06-19 @ 12:03), Max: 36.8 (11-05-19 @ 20:40)  HR: 67 (11-06-19 @ 12:03) (62 - 103)  BP: 96/68 (11-06-19 @ 12:03) (96/68 - 140/73)  RR: 18 (11-06-19 @ 12:03) (18 - 18)  SpO2: 95% (11-06-19 @ 12:03) (95% - 98%)  Wt(kg): --  I&O's Summary    05 Nov 2019 07:01  -  06 Nov 2019 07:00  --------------------------------------------------------  IN: 240 mL / OUT: 1850 mL / NET: -1610 mL    06 Nov 2019 07:01  -  06 Nov 2019 14:31  --------------------------------------------------------  IN: 520 mL / OUT: 300 mL / NET: 220 mL        Appearance: Normal	  Cardiovascular: Normal S1 S2,RRR, No JVD, No murmurs  Respiratory: dec bs at the right base	  Gastrointestinal:  Soft, Non-tender, + BS	  Extremities: Normal range of motion, No clubbing, cyanosis or edema  Vascular: Peripheral pulses palpable 2+ bilaterally     LABS:	 	                          15.6   9.33  )-----------( 236      ( 06 Nov 2019 09:13 )             50.3     11-06    132<L>  |  84<L>  |  61<H>  ----------------------------<  409<H>  3.2<L>   |  34<H>  |  2.02<H>    Ca    8.9      06 Nov 2019 06:26    TPro  6.8  /  Alb  2.8<L>  /  TBili  0.6  /  DBili  x   /  AST  8<L>  /  ALT  <5<L>  /  AlkPhos  85  11-06          CARDIAC MARKERS:

## 2019-11-06 NOTE — PROGRESS NOTE ADULT - ASSESSMENT
64 year old male with CKD Hypertension, Hyperlipidemia, Atrial Fibrillation, s/p DCCV, Coronary Artery Disease, NSTEMI, (2/2014), s/p PCI to mid LAD, Proximal LCx, Distal LCx (2/14, [facility], LULU), Congestive Heart Failure,  Diabetes Mellitus, CVA, Severe Left Internal Carotid Disease, s/p CEA (2/2014) recently admitted for hydropneumothorax, a drainage of effusion and sampling of fluid and analysis was suggested however patient refused at that time.  Pt. presents  today with worsening SOB and cough with worsening pl. effusion         1- ckd III   2- proteinuria   3- chf   4- HTN   5- pleural effusion   6- a fib    creatinine steady  range   will need arb  bumex 2 mg po qd   metolazone 5 mg qd  pulm recommendations noted for thoracentesis   hypokalemia replete kcl   cont toprol xl 100 mg qd

## 2019-11-06 NOTE — CONSULT NOTE ADULT - ATTENDING COMMENTS
Patient seen and examined, agree with the above assessment and plan by JOYCE Tompkins.  Pt well known to out practice, PMH as above presenting with recurrent dyspnea and cough in setting of recently diagnosed new pleural effusion  Pulm eval  plan for possible tap  hold Pradaxa
Agree with assessment and plan as above by Dr. Nielsen. Reviewed all pertinent labs, glucose values, and imaging studies. Modifications made as indicated above.     Moreno Romero D.O  [phone]
Will fu chest ct and then decide next steps.  Maintain sat>90% w O2 as needed

## 2019-11-07 DIAGNOSIS — I10 ESSENTIAL (PRIMARY) HYPERTENSION: ICD-10-CM

## 2019-11-07 DIAGNOSIS — E78.5 HYPERLIPIDEMIA, UNSPECIFIED: ICD-10-CM

## 2019-11-07 DIAGNOSIS — E11.65 TYPE 2 DIABETES MELLITUS WITH HYPERGLYCEMIA: ICD-10-CM

## 2019-11-07 LAB
ALBUMIN SERPL ELPH-MCNC: 2.9 G/DL — LOW (ref 3.3–5)
ALP SERPL-CCNC: 96 U/L — SIGNIFICANT CHANGE UP (ref 40–120)
ALT FLD-CCNC: 6 U/L — LOW (ref 10–45)
ANION GAP SERPL CALC-SCNC: 17 MMOL/L — SIGNIFICANT CHANGE UP (ref 5–17)
AST SERPL-CCNC: 8 U/L — LOW (ref 10–40)
BILIRUB SERPL-MCNC: 0.5 MG/DL — SIGNIFICANT CHANGE UP (ref 0.2–1.2)
BUN SERPL-MCNC: 65 MG/DL — HIGH (ref 7–23)
CALCIUM SERPL-MCNC: 9.1 MG/DL — SIGNIFICANT CHANGE UP (ref 8.4–10.5)
CHLORIDE SERPL-SCNC: 85 MMOL/L — LOW (ref 96–108)
CO2 SERPL-SCNC: 31 MMOL/L — SIGNIFICANT CHANGE UP (ref 22–31)
CREAT SERPL-MCNC: 2.02 MG/DL — HIGH (ref 0.5–1.3)
GLUCOSE BLDC GLUCOMTR-MCNC: 138 MG/DL — HIGH (ref 70–99)
GLUCOSE BLDC GLUCOMTR-MCNC: 238 MG/DL — HIGH (ref 70–99)
GLUCOSE BLDC GLUCOMTR-MCNC: 288 MG/DL — HIGH (ref 70–99)
GLUCOSE BLDC GLUCOMTR-MCNC: 353 MG/DL — HIGH (ref 70–99)
GLUCOSE SERPL-MCNC: 425 MG/DL — HIGH (ref 70–99)
HBA1C BLD-MCNC: 11.7 % — HIGH (ref 4–5.6)
LDH SERPL L TO P-CCNC: 218 U/L — SIGNIFICANT CHANGE UP (ref 50–242)
POTASSIUM SERPL-MCNC: 3.4 MMOL/L — LOW (ref 3.5–5.3)
POTASSIUM SERPL-SCNC: 3.4 MMOL/L — LOW (ref 3.5–5.3)
PROT SERPL-MCNC: 6.9 G/DL — SIGNIFICANT CHANGE UP (ref 6–8.3)
SODIUM SERPL-SCNC: 133 MMOL/L — LOW (ref 135–145)

## 2019-11-07 PROCEDURE — 99232 SBSQ HOSP IP/OBS MODERATE 35: CPT | Mod: GC

## 2019-11-07 RX ORDER — INSULIN LISPRO 100/ML
16 VIAL (ML) SUBCUTANEOUS
Refills: 0 | Status: DISCONTINUED | OUTPATIENT
Start: 2019-11-07 | End: 2019-11-07

## 2019-11-07 RX ORDER — INSULIN GLARGINE 100 [IU]/ML
40 INJECTION, SOLUTION SUBCUTANEOUS AT BEDTIME
Refills: 0 | Status: DISCONTINUED | OUTPATIENT
Start: 2019-11-07 | End: 2019-11-08

## 2019-11-07 RX ORDER — INSULIN LISPRO 100/ML
10 VIAL (ML) SUBCUTANEOUS
Refills: 0 | Status: DISCONTINUED | OUTPATIENT
Start: 2019-11-07 | End: 2019-11-08

## 2019-11-07 RX ORDER — INSULIN GLARGINE 100 [IU]/ML
48 INJECTION, SOLUTION SUBCUTANEOUS AT BEDTIME
Refills: 0 | Status: DISCONTINUED | OUTPATIENT
Start: 2019-11-07 | End: 2019-11-07

## 2019-11-07 RX ORDER — POTASSIUM CHLORIDE 20 MEQ
40 PACKET (EA) ORAL ONCE
Refills: 0 | Status: COMPLETED | OUTPATIENT
Start: 2019-11-07 | End: 2019-11-07

## 2019-11-07 RX ORDER — INSULIN LISPRO 100/ML
10 VIAL (ML) SUBCUTANEOUS
Refills: 0 | Status: DISCONTINUED | OUTPATIENT
Start: 2019-11-07 | End: 2019-11-14

## 2019-11-07 RX ORDER — INSULIN LISPRO 100/ML
15 VIAL (ML) SUBCUTANEOUS
Refills: 0 | Status: DISCONTINUED | OUTPATIENT
Start: 2019-11-07 | End: 2019-11-08

## 2019-11-07 RX ADMIN — Medication 100 MILLIGRAM(S): at 05:09

## 2019-11-07 RX ADMIN — Medication 10: at 08:06

## 2019-11-07 RX ADMIN — Medication 40 MILLIEQUIVALENT(S): at 12:05

## 2019-11-07 RX ADMIN — Medication 10 UNIT(S): at 17:22

## 2019-11-07 RX ADMIN — Medication 81 MILLIGRAM(S): at 12:05

## 2019-11-07 RX ADMIN — Medication 100 MILLIGRAM(S): at 17:23

## 2019-11-07 RX ADMIN — Medication 6: at 12:05

## 2019-11-07 RX ADMIN — INSULIN GLARGINE 40 UNIT(S): 100 INJECTION, SOLUTION SUBCUTANEOUS at 21:52

## 2019-11-07 RX ADMIN — Medication 10 UNIT(S): at 12:05

## 2019-11-07 RX ADMIN — HEPARIN SODIUM 5000 UNIT(S): 5000 INJECTION INTRAVENOUS; SUBCUTANEOUS at 05:09

## 2019-11-07 RX ADMIN — HEPARIN SODIUM 5000 UNIT(S): 5000 INJECTION INTRAVENOUS; SUBCUTANEOUS at 17:22

## 2019-11-07 RX ADMIN — Medication 10 UNIT(S): at 08:06

## 2019-11-07 RX ADMIN — BUMETANIDE 2 MILLIGRAM(S): 0.25 INJECTION INTRAMUSCULAR; INTRAVENOUS at 05:09

## 2019-11-07 RX ADMIN — OXYCODONE HYDROCHLORIDE 5 MILLIGRAM(S): 5 TABLET ORAL at 16:42

## 2019-11-07 RX ADMIN — OXYCODONE HYDROCHLORIDE 5 MILLIGRAM(S): 5 TABLET ORAL at 16:12

## 2019-11-07 RX ADMIN — Medication 100 MILLIGRAM(S): at 06:43

## 2019-11-07 NOTE — PROGRESS NOTE ADULT - SUBJECTIVE AND OBJECTIVE BOX
Interventional Radiology Pre-Procedure Note    This is a 64y Male with PMH of CAD s/p stents, HFrEF (EF 40% 12/2018), DMT2, HTN, CKD, NSTEMI, Afib (on Pradaxa) found to have worsening right lung pleural effusion referred to IR for diagnostic thoracentesis.     Procedure: Right diagnostic thoracentesis with possible chest tube placement.     Diagnosis/Indication: Patient is a 64y old  Male who presents with a chief complaint of SOB and cough secondary to right pleural effusion (07 Nov 2019 14:56)      PAST MEDICAL & SURGICAL HISTORY:  Neuropathy  CAD (coronary artery disease)  MI (myocardial infarction): circa 2014  Atrial fibrillation  TIA (transient ischemic attack)  DM type 2 (diabetes mellitus, type 2)  HLD (hyperlipidemia)  HTN (hypertension), benign  S/P carotid endarterectomy: left  Status post angioplasty with stent: LULU x 3 2/7/2014     Male    Allergies: No Known Allergies    LABS:  CBC Full  -  ( 06 Nov 2019 09:13 )  WBC Count : 9.33 K/uL  RBC Count : 6.12 M/uL  Hemoglobin : 15.6 g/dL  Hematocrit : 50.3 %  Platelet Count - Automated : 236 K/uL  Mean Cell Volume : 82.2 fl  Mean Cell Hemoglobin : 25.5 pg  Mean Cell Hemoglobin Concentration : 31.0 gm/dL  Auto Neutrophil # : x  Auto Lymphocyte # : x  Auto Monocyte # : x  Auto Eosinophil # : x  Auto Basophil # : x  Auto Neutrophil % : x  Auto Lymphocyte % : x  Auto Monocyte % : x  Auto Eosinophil % : x  Auto Basophil % : x    11-07    133<L>  |  85<L>  |  65<H>  ----------------------------<  425<H>  3.4<L>   |  31  |  2.02<H>    Ca    9.1      07 Nov 2019 06:23    TPro  6.9  /  Alb  2.9<L>  /  TBili  0.5  /  DBili  x   /  AST  8<L>  /  ALT  6<L>  /  AlkPhos  96  11-07    Procedure/ risks/ benefits were explained, informed consent obtained from patient, verbalizes understanding.

## 2019-11-07 NOTE — PROGRESS NOTE ADULT - SUBJECTIVE AND OBJECTIVE BOX
Chief Complaint: hyperglycemia in patient with DM2    History: Patient seen and examined at bedside. Tolerating diet without nausea, vomiting or abdominal pain. However patient does report some lightheadedness and disorientation after lunch.     MEDICATIONS  (STANDING):  aspirin enteric coated 81 milliGRAM(s) Oral daily  buMETAnide 2 milliGRAM(s) Oral daily  dextrose 5%. 1000 milliLiter(s) (50 mL/Hr) IV Continuous <Continuous>  dextrose 50% Injectable 12.5 Gram(s) IV Push once  dextrose 50% Injectable 25 Gram(s) IV Push once  dextrose 50% Injectable 25 Gram(s) IV Push once  heparin  Injectable 5000 Unit(s) SubCutaneous every 12 hours  influenza   Vaccine 0.5 milliLiter(s) IntraMuscular once  insulin glargine Injectable (LANTUS) 30 Unit(s) SubCutaneous at bedtime  insulin lispro (HumaLOG) corrective regimen sliding scale   SubCutaneous at bedtime  insulin lispro (HumaLOG) corrective regimen sliding scale   SubCutaneous three times a day before meals  insulin lispro Injectable (HumaLOG) 10 Unit(s) SubCutaneous three times a day before meals  metolazone 5 milliGRAM(s) Oral daily  metoprolol succinate  milliGRAM(s) Oral daily  pregabalin 100 milliGRAM(s) Oral two times a day    MEDICATIONS  (PRN):  acetaminophen   Tablet .. 650 milliGRAM(s) Oral every 4 hours PRN Moderate Pain (4 - 6)  dextrose 40% Gel 15 Gram(s) Oral once PRN Blood Glucose LESS THAN 70 milliGRAM(s)/deciliter  glucagon  Injectable 1 milliGRAM(s) IntraMuscular once PRN Glucose LESS THAN 70 milligrams/deciliter  oxyCODONE    IR 5 milliGRAM(s) Oral two times a day PRN Severe Pain (7 - 10)    PHYSICAL EXAM:  VITALS: T(C): 36.4 (11-07-19 @ 12:06)  T(F): 97.5 (11-07-19 @ 12:06), Max: 98.8 (11-06-19 @ 18:17)  HR: 84 (11-07-19 @ 12:06) (83 - 96)  BP: 126/84 (11-07-19 @ 12:06) (103/67 - 157/85)  RR:  (17 - 18)  SpO2:  (93% - 98%)  Wt(kg): 132kg   GENERAL: NAD, well-groomed, well-developed  RESPIRATORY: decreased breath sounds b/l LE, on nasal cannula   CARDIOVASCULAR: Regular rate and rhythm; No murmurs; no peripheral edema  PSYCH: Alert and oriented x 3, reactive affect     POCT Blood Glucose.: 288 mg/dL (11-07-19 @ 11:26) H10+6  POCT Blood Glucose.: 353 mg/dL (11-07-19 @ 07:46) H10+10  POCT Blood Glucose.: 331 mg/dL (11-06-19 @ 21:09) L 30, H4  POCT Blood Glucose.: 272 mg/dL (11-06-19 @ 16:35) H10+6  POCT Blood Glucose.: 394 mg/dL (11-06-19 @ 12:57) H10   POCT Blood Glucose.: 352 mg/dL (11-06-19 @ 07:45)  POCT Blood Glucose.: 370 mg/dL (11-05-19 @ 21:02)  POCT Blood Glucose.: 248 mg/dL (11-05-19 @ 17:40)  POCT Blood Glucose.: 336 mg/dL (11-05-19 @ 15:15)      11-07    133<L>  |  85<L>  |  65<H>  ----------------------------<  425<H>  3.4<L>   |  31  |  2.02<H>    EGFR if : 40<L>  EGFR if non : 34<L>    Ca    9.1      11-07    TPro  6.9  /  Alb  2.9<L>  /  TBili  0.5  /  DBili  x   /  AST  8<L>  /  ALT  6<L>  /  AlkPhos  96  11-07    Hemoglobin A1C, Whole Blood: 11.7 % <H> [4.0 - 5.6] (11-07-19 @ 09:14)  Hemoglobin A1C, Whole Blood: 10.3 % <H> [4.0 - 5.6] (10-17-19 @ 07:43)

## 2019-11-07 NOTE — PROGRESS NOTE ADULT - ASSESSMENT
63 yo M with history of uncontrolled DM2 c/b neuropathy, CKD3, macrovascular disease (CAD, TIA, left carotid artery stenosis), Hypertension, Hyperlipidemia, Atrial Fibrillation on Pradaxa, CHF presenting with SOB and cough secondary to right hydropneumothorax. Endocrine consult requested for management of hyperglycemia in patient with DM2 A1c >10% (high risk patient with high level decision-making). Overnight patient BG remains 270s- 350s.     uncontrolled DM2 c/b neuropathy, CKD3, macrovascular disease (CAD and CVA)  A1c 11.7%, goal A1c ~7  FS goal 100-180, above goal   Patient reports some lightheadedness and disorientation after lunch. Will monitor for hypoglycemia. Patient may be used to being hyperglycemic.   FS premeal and qhs  carb consistent diet  Will continue Lantus 30 units qhs   Will continue Humalog 10 units premeal (hold if NPO)   Recommend mod premeal and qhs sliding scale   Patient to follow up with Endocrinologist, Dr Real, on discharge  Continue Basal/bolus, dose to be determined  Patient to follow up with  ophthalmology as outpt     HTN   goal 130/80, at goal   management per primary team     HLD  Consider fasting lipid panel   goal LDL <70  Continue statin 65 yo M with history of uncontrolled DM2 c/b neuropathy, CKD3, macrovascular disease (CAD, TIA, left carotid artery stenosis), Hypertension, Hyperlipidemia, Atrial Fibrillation on Pradaxa, CHF presenting with SOB and cough secondary to right hydropneumothorax. Endocrine consult requested for management of hyperglycemia in patient with DM2 A1c >10% (high risk patient with high level decision-making). Overnight patient BG remains 270s- 350.

## 2019-11-07 NOTE — PROGRESS NOTE ADULT - PROBLEM SELECTOR PLAN 1
CT chest with increase in R pl effusion with compressive atelectasis   -Previously noted hydropneumothorax on CT from 10/16, not present on current CT  -Plan for R thoracentesis tomorrow (11/8) in IR for diagnostic thoracentesis. Send fluid studies, culture, cyto.  -Continue to hold Pradaxa for procedure  -Keep sats >92% with supplemental O2 as needed

## 2019-11-07 NOTE — PROGRESS NOTE ADULT - ASSESSMENT
65 y/o M with PMH of CAD s/p stents, HFrEF (EF 40% 12/2018), DMT2, HTN, CKD, NSTEMI, Afib (on Pradaxa). Presents to ED with worsening SOB and productive cough with .   pt  recently hospitalized last month and found to have small R hydropneumothorax of unclear etiology. Plans previous admission for drainage of effusion and sampling of fluid and analysis, but patient refused at that time and agreed for outpt f/u. presents now w/ sob       Problem: Hydropneumothorax.   CT from previous admission with R sided hydropneumothorax of unclear etiology.  -Pt refused drainage at that time.  ir drainage thoro in am   holding  pradaxa       ·  Problem: CHF (congestive heart failure).  Recommendation: -Keep O>I as tolerated.   c/w current meds  cards eval noted    dm /uncontrolled  endo eval noted  fsg riss  c/w insulin regimen as per endo        dm neuropathy  c/w lyrica  oxycodone for severe pain     alex/ ckd  renal f/u   c/w recs as per renal

## 2019-11-07 NOTE — PROGRESS NOTE ADULT - SUBJECTIVE AND OBJECTIVE BOX
CARDIOLOGY FOLLOW UP - Dr. Mazariegos    CC no cp/sob  still w/ cough       PHYSICAL EXAM:  T(C): 36.5 (11-07-19 @ 08:41), Max: 37.1 (11-06-19 @ 18:17)  HR: 89 (11-07-19 @ 08:41) (67 - 96)  BP: 121/73 (11-07-19 @ 08:41) (96/68 - 157/85)  RR: 18 (11-07-19 @ 08:41) (17 - 18)  SpO2: 98% (11-07-19 @ 08:41) (93% - 98%)  Wt(kg): --  I&O's Summary    06 Nov 2019 07:01  -  07 Nov 2019 07:00  --------------------------------------------------------  IN: 1060 mL / OUT: 900 mL / NET: 160 mL    07 Nov 2019 07:01  -  07 Nov 2019 11:23  --------------------------------------------------------  IN: 320 mL / OUT: 800 mL / NET: -480 mL        Appearance: Normal	  Cardiovascular: Normal S1 S2,RRR, No JVD, No murmurs  Respiratory: dec at right base   Gastrointestinal:  Soft, Non-tender, + BS	  Extremities: Normal range of motion, No clubbing, cyanosis or edema        MEDICATIONS  (STANDING):  aspirin enteric coated 81 milliGRAM(s) Oral daily  buMETAnide 2 milliGRAM(s) Oral daily  dextrose 5%. 1000 milliLiter(s) (50 mL/Hr) IV Continuous <Continuous>  dextrose 50% Injectable 12.5 Gram(s) IV Push once  dextrose 50% Injectable 25 Gram(s) IV Push once  dextrose 50% Injectable 25 Gram(s) IV Push once  heparin  Injectable 5000 Unit(s) SubCutaneous every 12 hours  influenza   Vaccine 0.5 milliLiter(s) IntraMuscular once  insulin glargine Injectable (LANTUS) 30 Unit(s) SubCutaneous at bedtime  insulin lispro (HumaLOG) corrective regimen sliding scale   SubCutaneous at bedtime  insulin lispro (HumaLOG) corrective regimen sliding scale   SubCutaneous three times a day before meals  insulin lispro Injectable (HumaLOG) 10 Unit(s) SubCutaneous three times a day before meals  metolazone 5 milliGRAM(s) Oral daily  metoprolol succinate  milliGRAM(s) Oral daily  potassium chloride    Tablet ER 40 milliEquivalent(s) Oral once  pregabalin 100 milliGRAM(s) Oral two times a day      TELEMETRY: afib 	    ECG:  	  RADIOLOGY:   DIAGNOSTIC TESTING:  [ ] Echocardiogram:  [ ]  Catheterization:  [ ] Stress Test:    OTHER: 	    LABS:	 	                                15.6   9.33  )-----------( 236      ( 06 Nov 2019 09:13 )             50.3     11-07    133<L>  |  85<L>  |  65<H>  ----------------------------<  425<H>  3.4<L>   |  31  |  2.02<H>    Ca    9.1      07 Nov 2019 06:23    TPro  6.9  /  Alb  2.9<L>  /  TBili  0.5  /  DBili  x   /  AST  8<L>  /  ALT  6<L>  /  AlkPhos  96  11-07

## 2019-11-07 NOTE — PROGRESS NOTE ADULT - ASSESSMENT
64 year old male with Hypertension, Hyperlipidemia, Atrial Fibrillation, s/p DCCV, Coronary Artery Disease, NSTEMI, (2/2014), s/p PCI to mid LAD, Proximal LCx, Distal LCx (2/14, [facility], [facility]), Congestive Heart Failure, Normal Ejection Fraction, Diabetes Mellitus, CVA, Severe Left Internal Carotid Disease, s/p CEA (2/2014) presenting with worsening SOB, cough.     1.Right-sided Pleural effusion  Chest CT reveals interval increase in pleural effusion w no evidence of hydropneumothorax  cv status remains stable no evidence of ADHF on exam   pulm eval appreciated, plan for thoracentesis tomorrow     2. Chronic Congestive heart failure, Diastolic.   minimal LE edema, no evidence of ADHF on exam   continue current diuresis regimen   recent echo with no evidence of pericardial effusion, mod LAE, grossly borderline LV sys dysfx EF 45% (reported LVEF in last study is  slightly lower, but atrial fibrillation makes it difficult to assess ejection fraction. no wall motion abnl     3. Atrial Fibrillation, hx  c/w metoprolol succinate ER 100mg daily  hold pradaxa 150mg BID for thoracentesis     4. Coronary Artery Disease. S/P PCI to LAD, LCx.  cv stable w/o chest pain  EKG with no evidence of ACS   Continue ASA 81mg and statin    dvtt ppx

## 2019-11-07 NOTE — PROGRESS NOTE ADULT - PROBLEM SELECTOR PLAN 1
uncontrolled DM2 c/b neuropathy, CKD3, macrovascular disease (CAD and CVA)  A1c 11.7%, goal A1c ~7  FS goal 100-180, above goal   Patient reports some lightheadedness and disorientation after lunch. Will monitor for hypoglycemia. Patient may be used to being hyperglycemic.   FS premeal and qhs  carb consistent diet  Will increase Lantus to 48 units qhs   Will continue Humalog 16 units premeal (hold if NPO)   Recommend mod premeal and qhs sliding scale   Patient to follow up with Endocrinologist, Dr Real, on discharge  Continue Basal/bolus, dose to be determined  Patient to follow up with  ophthalmology as outpt uncontrolled DM2 c/b neuropathy, CKD3, macrovascular disease (CAD and CVA)  A1c 11.7%, goal A1c ~7  FS goal 100-180, above goal   Patient reports some lightheadedness and disorientation after lunch. Will monitor for hypoglycemia. Patient may be used to being hyperglycemic.   FS premeal and qhs  carb consistent diet  Will increase Lantus to 40 units qhs   Will continue Humalog 15/10/10 units premeal (hold if NPO)   Recommend mod premeal and qhs sliding scale   Patient to follow up with Endocrinologist, Dr Real, on discharge  Continue Basal/bolus, dose to be determined  Patient to follow up with  ophthalmology as outpt

## 2019-11-07 NOTE — PROGRESS NOTE ADULT - SUBJECTIVE AND OBJECTIVE BOX
Follow-up Pulm Progress Note    No new respiratory events overnight.  Denies SOB/CP.   93% on RA    Medications:  MEDICATIONS  (STANDING):  aspirin enteric coated 81 milliGRAM(s) Oral daily  buMETAnide 2 milliGRAM(s) Oral daily  dextrose 5%. 1000 milliLiter(s) (50 mL/Hr) IV Continuous <Continuous>  dextrose 50% Injectable 12.5 Gram(s) IV Push once  dextrose 50% Injectable 25 Gram(s) IV Push once  dextrose 50% Injectable 25 Gram(s) IV Push once  heparin  Injectable 5000 Unit(s) SubCutaneous every 12 hours  influenza   Vaccine 0.5 milliLiter(s) IntraMuscular once  insulin glargine Injectable (LANTUS) 30 Unit(s) SubCutaneous at bedtime  insulin lispro (HumaLOG) corrective regimen sliding scale   SubCutaneous at bedtime  insulin lispro (HumaLOG) corrective regimen sliding scale   SubCutaneous three times a day before meals  insulin lispro Injectable (HumaLOG) 10 Unit(s) SubCutaneous three times a day before meals  metolazone 5 milliGRAM(s) Oral daily  metoprolol succinate  milliGRAM(s) Oral daily  pregabalin 100 milliGRAM(s) Oral two times a day    MEDICATIONS  (PRN):  acetaminophen   Tablet .. 650 milliGRAM(s) Oral every 4 hours PRN Moderate Pain (4 - 6)  dextrose 40% Gel 15 Gram(s) Oral once PRN Blood Glucose LESS THAN 70 milliGRAM(s)/deciliter  glucagon  Injectable 1 milliGRAM(s) IntraMuscular once PRN Glucose LESS THAN 70 milligrams/deciliter  oxyCODONE    IR 5 milliGRAM(s) Oral two times a day PRN Severe Pain (7 - 10)          Vital Signs Last 24 Hrs  T(C): 36.4 (07 Nov 2019 12:06), Max: 37.1 (06 Nov 2019 18:17)  T(F): 97.5 (07 Nov 2019 12:06), Max: 98.8 (06 Nov 2019 18:17)  HR: 84 (07 Nov 2019 12:06) (83 - 96)  BP: 126/84 (07 Nov 2019 12:06) (103/67 - 157/85)  BP(mean): --  RR: 18 (07 Nov 2019 12:06) (17 - 18)  SpO2: 94% (07 Nov 2019 12:06) (93% - 98%) on RA          11-06 @ 07:01  -  11-07 @ 07:00  --------------------------------------------------------  IN: 1060 mL / OUT: 900 mL / NET: 160 mL          LABS:                        15.6   9.33  )-----------( 236      ( 06 Nov 2019 09:13 )             50.3     11-07    133<L>  |  85<L>  |  65<H>  ----------------------------<  425<H>  3.4<L>   |  31  |  2.02<H>    Ca    9.1      07 Nov 2019 06:23    TPro  6.9  /  Alb  2.9<L>  /  TBili  0.5  /  DBili  x   /  AST  8<L>  /  ALT  6<L>  /  AlkPhos  96  11-07          CAPILLARY BLOOD GLUCOSE      POCT Blood Glucose.: 288 mg/dL (07 Nov 2019 11:26)          Serum Pro-Brain Natriuretic Peptide: 1310 pg/mL (11-05-19 @ 13:30)          Physical Examination:  PULM: Decreased BS R base  CVS: S1, S2 heard    RADIOLOGY REVIEWED  CT chest: < from: CT Chest No Cont (11.05.19 @ 14:42) >  FINDINGS:     Secretions in the upper trachea.  Interval increase in size of partially   loculated moderate right pleural effusion with near complete compressive   atelectasis of the right lower lobe with interval progression. Suggestion   of right pleural thickening although evaluation of the pleural surface is   limited due to lack of intravenous contrast. Areas of compressive or   rounded atelectasis involving the lateral segment of the right middle   lobe. Lingular and left lower lobe subsegmental atelectasis.  Interval   resolution of left pleural effusion. Scattered bilateral calcified   granulomas. No pneumothorax.    Chronic elevation of the left hemidiaphragm.    Prominent right supraclavicular lymph node as seen in 2017. No axillary   or mediastinal lymphadenopathy.    The heart size is enlarged, particularly left atrium. No pericardial   effusion. Coronary artery and aortic valvular calcifications.   Atherosclerotic calcifications of the aorta. Main pulmonary artery is   dilated measuring up to 3.5 cm at the level of bifurcation which can be   seen in the setting of pulmonary hypertension.    Imagedportions of the upper abdomen is unremarkable.    Thoracic kyphosis with degenerative changes.    IMPRESSION:     Interval increase in size of partially loculated moderate right pleural   effusion with compressive atelectasis of a large portion of the right   lower lobe with interval progression. No pneumothorax.    Evaluation of the right pleural surface is limited due to lack of   intravenous contrast.    < end of copied text >

## 2019-11-07 NOTE — PROGRESS NOTE ADULT - SUBJECTIVE AND OBJECTIVE BOX
[facility]   [phone]  RENAL FOLLOW UP NOTE  --------------------------------------------------------------------------------  Chief Complaint:    24 hour events/subjective:    seen earlier. still with c/o sob but with O2 feels better.     PAST HISTORY  --------------------------------------------------------------------------------  No significant changes to PMH, PSH, FHx, SHx, unless otherwise noted    ALLERGIES & MEDICATIONS  --------------------------------------------------------------------------------  Allergies    No Known Allergies    Intolerances      Standing Inpatient Medications  aspirin enteric coated 81 milliGRAM(s) Oral daily  buMETAnide 2 milliGRAM(s) Oral daily  dextrose 5%. 1000 milliLiter(s) IV Continuous <Continuous>  dextrose 50% Injectable 12.5 Gram(s) IV Push once  dextrose 50% Injectable 25 Gram(s) IV Push once  dextrose 50% Injectable 25 Gram(s) IV Push once  heparin  Injectable 5000 Unit(s) SubCutaneous every 12 hours  influenza   Vaccine 0.5 milliLiter(s) IntraMuscular once  insulin glargine Injectable (LANTUS) 40 Unit(s) SubCutaneous at bedtime  insulin lispro (HumaLOG) corrective regimen sliding scale   SubCutaneous at bedtime  insulin lispro (HumaLOG) corrective regimen sliding scale   SubCutaneous three times a day before meals  insulin lispro Injectable (HumaLOG) 10 Unit(s) SubCutaneous before lunch  insulin lispro Injectable (HumaLOG) 15 Unit(s) SubCutaneous before breakfast  insulin lispro Injectable (HumaLOG) 10 Unit(s) SubCutaneous before dinner  metolazone 5 milliGRAM(s) Oral daily  metoprolol succinate  milliGRAM(s) Oral daily  pregabalin 100 milliGRAM(s) Oral two times a day    PRN Inpatient Medications  acetaminophen   Tablet .. 650 milliGRAM(s) Oral every 4 hours PRN  dextrose 40% Gel 15 Gram(s) Oral once PRN  glucagon  Injectable 1 milliGRAM(s) IntraMuscular once PRN  oxyCODONE    IR 5 milliGRAM(s) Oral two times a day PRN      REVIEW OF SYSTEMS  --------------------------------------------------------------------------------    Gen: denies fevers/chills,  CVS: denies chest pain/palpitations  Resp: +  SOB/Cough  GI: Denies N/V/Abd pain  : Denies dysuria/oliguria/hematuria    All other systems were reviewed and are negative, except as noted.    VITALS/PHYSICAL EXAM  --------------------------------------------------------------------------------  T(C): 36.7 (11-07-19 @ 19:57), Max: 37 (11-07-19 @ 16:08)  HR: 106 (11-07-19 @ 19:57) (84 - 106)  BP: 132/66 (11-07-19 @ 19:57) (115/67 - 157/85)  RR: 18 (11-07-19 @ 19:57) (17 - 18)  SpO2: 92% (11-07-19 @ 19:57) (92% - 98%)  Wt(kg): --        11-06-19 @ 07:01  -  11-07-19 @ 07:00  --------------------------------------------------------  IN: 1060 mL / OUT: 900 mL / NET: 160 mL    11-07-19 @ 07:01  -  11-07-19 @ 20:07  --------------------------------------------------------  IN: 640 mL / OUT: 1450 mL / NET: -810 mL      Physical Exam:  	  Gen:  + obese appears with mildly tachypneic  on O2    	no jvd   	Pulm: decrease bs  R base upto 3/4  no rales or ronchi or wheezing  	CV: RRR, S1S2; no rub  	Abd: +BS, soft, nontender/nondistended  	: No suprapubic tenderness  	UE: Warm, no cyanosis  no clubbing,  no edema  	LE: Warm, no cyanosis  no clubbing, no edema    	  LABS/STUDIES  --------------------------------------------------------------------------------              15.6   9.33  >-----------<  236      [11-06-19 @ 09:13]              50.3     133  |  85  |  65  ----------------------------<  425      [11-07-19 @ 06:23]  3.4   |  31  |  2.02        Ca     9.1     [11-07-19 @ 06:23]    TPro  6.9  /  Alb  2.9  /  TBili  0.5  /  DBili  x   /  AST  8   /  ALT  6   /  AlkPhos  96  [11-07-19 @ 06:23]              [11-07-19 @ 06:23]    Creatinine Trend:  SCr 2.02 [11-07 @ 06:23]  SCr 2.02 [11-06 @ 06:26]  SCr 1.80 [11-05 @ 15:07]  SCr 1.83 [11-05 @ 13:30]  SCr 2.07 [10-19 @ 06:19]              Urinalysis - [10-17-19 @ 04:40]      Color Light Yellow / Appearance Clear / SG 1.015 / pH 6.5      Gluc 500 mg/dL / Ketone Negative  / Bili Negative / Urobili Negative       Blood Small / Protein 300 mg/dL / Leuk Est Negative / Nitrite Negative      RBC 8 / WBC 3 / Hyaline 0 / Gran  / Sq Epi  / Non Sq Epi 0 / Bacteria Negative    Urine Creatinine 76      [11-06-19 @ 08:10]  Urine Protein 134      [11-06-19 @ 08:10]    HbA1c 11.7      [11-07-19 @ 09:14]  TSH 1.71      [12-28-18 @ 14:58]

## 2019-11-07 NOTE — PROGRESS NOTE ADULT - ASSESSMENT
65 y/o M with PMH of CAD s/p stents, HFrEF (EF 40% 12/2018), DMT2, HTN, CKD, NSTEMI, Afib (on Pradaxa). Presents to ED with worsening SOB and productive cough with clear/whitish sputum for the past week. He was recently hospitalized last month and found to have small R hydropneumothorax of unclear etiology. Plans previous admission for drainage of effusion and sampling of fluid and analysis, but patient refused at that time, as he watned to go to his son's wedding. He did not present for outpt visit. CT chest with increase in R pl effusion. Currently on RA, sats 94%.

## 2019-11-07 NOTE — PROGRESS NOTE ADULT - ASSESSMENT
64 year old male with CKD Hypertension, Hyperlipidemia, Atrial Fibrillation, s/p DCCV, Coronary Artery Disease, NSTEMI, (2/2014), s/p PCI to mid LAD, Proximal LCx, Distal LCx (2/14, [facility], [facility]), Congestive Heart Failure,  Diabetes Mellitus, CVA, Severe Left Internal Carotid Disease, s/p CEA (2/2014) recently admitted for hydropneumothorax, a drainage of effusion and sampling of fluid and analysis was suggested however patient refused at that time.  Pt. presents  today with worsening SOB and cough with worsening pl. effusion         1- ckd III   2- proteinuria   3- chf   4- HTN   5- pleural effusion   6- a fib    creatinine slightly higher   will need arb  bumex 2 mg po qd   metolazone 5 mg qd to  change to qod  in am if cr worsens again   pulm recommendations noted for thoracentesis   hypokalemia k still low kcl 40 meq   cont toprol xl 100 mg qd

## 2019-11-07 NOTE — PROGRESS NOTE ADULT - SUBJECTIVE AND OBJECTIVE BOX
CHIEF COMPLAINT:Patient is a 64y old  Male who presents with a chief complaint of   	        PAST MEDICAL & SURGICAL HISTORY:  Neuropathy  CAD (coronary artery disease)  MI (myocardial infarction): circa 2014  Atrial fibrillation  TIA (transient ischemic attack)  DM type 2 (diabetes mellitus, type 2)  HLD (hyperlipidemia)  HTN (hypertension), benign  S/P carotid endarterectomy: left  Status post angioplasty with stent: LULU x 3 2/7/2014          REVIEW OF SYSTEMS:  CONSTITUTIONAL: No fever, weight loss, or fatigue  EYES: No eye pain, visual disturbances, or discharge  NECK: No pain or stiffness  RESPIRATORY:breathing ok   CARDIOVASCULAR: No chest pain, palpitations, passing out, dizziness,  GASTROINTESTINAL: No abdominal or epigastric pain. No nausea, vomiting, or hematemesis; No diarrhea or constipation. No melena or hematochezia.  GENITOURINARY: No dysuria, frequency, hematuria, or incontinence  NEUROLOGICAL: No headaches, memory loss, loss of strength, numbness, or tremors  MUSCULOSKELETAL: No joint pain or swelling; No muscle, back, or extremity pain    Medications:  MEDICATIONS  (STANDING):  aspirin enteric coated 81 milliGRAM(s) Oral daily  buMETAnide 2 milliGRAM(s) Oral daily  dextrose 5%. 1000 milliLiter(s) (50 mL/Hr) IV Continuous <Continuous>  dextrose 50% Injectable 12.5 Gram(s) IV Push once  dextrose 50% Injectable 25 Gram(s) IV Push once  dextrose 50% Injectable 25 Gram(s) IV Push once  heparin  Injectable 5000 Unit(s) SubCutaneous every 12 hours  influenza   Vaccine 0.5 milliLiter(s) IntraMuscular once  insulin glargine Injectable (LANTUS) 30 Unit(s) SubCutaneous at bedtime  insulin lispro (HumaLOG) corrective regimen sliding scale   SubCutaneous at bedtime  insulin lispro (HumaLOG) corrective regimen sliding scale   SubCutaneous three times a day before meals  insulin lispro Injectable (HumaLOG) 10 Unit(s) SubCutaneous three times a day before meals  metolazone 5 milliGRAM(s) Oral daily  metoprolol succinate  milliGRAM(s) Oral daily  potassium chloride    Tablet ER 40 milliEquivalent(s) Oral once  pregabalin 100 milliGRAM(s) Oral two times a day    MEDICATIONS  (PRN):  acetaminophen   Tablet .. 650 milliGRAM(s) Oral every 4 hours PRN Moderate Pain (4 - 6)  dextrose 40% Gel 15 Gram(s) Oral once PRN Blood Glucose LESS THAN 70 milliGRAM(s)/deciliter  glucagon  Injectable 1 milliGRAM(s) IntraMuscular once PRN Glucose LESS THAN 70 milligrams/deciliter  oxyCODONE    IR 5 milliGRAM(s) Oral two times a day PRN Severe Pain (7 - 10)    	    PHYSICAL EXAM:  T(C): 36.5 (11-07-19 @ 08:41), Max: 37.1 (11-06-19 @ 18:17)  HR: 89 (11-07-19 @ 08:41) (67 - 96)  BP: 121/73 (11-07-19 @ 08:41) (96/68 - 157/85)  RR: 18 (11-07-19 @ 08:41) (17 - 18)  SpO2: 98% (11-07-19 @ 08:41) (93% - 98%)  Wt(kg): --  I&O's Summary    06 Nov 2019 07:01  -  07 Nov 2019 07:00  --------------------------------------------------------  IN: 1060 mL / OUT: 900 mL / NET: 160 mL    07 Nov 2019 07:01  -  07 Nov 2019 10:36  --------------------------------------------------------  IN: 320 mL / OUT: 800 mL / NET: -480 mL        Appearance: Normal	  HEENT:   Normal oral mucosa, PERRL, EOMI	  Lymphatic: No lymphadenopathy  Cardiovascular: Normal S1 S2, No JVD,  Respiratory:  dec bs   Psychiatry: A & O   Gastrointestinal:  Soft, Non-tender, + BS	  Skin: No rashes, No ecchymoses, No cyanosis	  Neurologic: Non-focal  Extremities: Normal range of motion, No clubbing, cyanosis   Vascular: Peripheral pulses palpable  pvd      TELEMETRY: 	    ECG:  	  RADIOLOGY:  OTHER: 	  	  LABS:	 	    CARDIAC MARKERS:                                15.6   9.33  )-----------( 236      ( 06 Nov 2019 09:13 )             50.3     11-07    133<L>  |  85<L>  |  65<H>  ----------------------------<  425<H>  3.4<L>   |  31  |  2.02<H>    Ca    9.1      07 Nov 2019 06:23    TPro  6.9  /  Alb  2.9<L>  /  TBili  0.5  /  DBili  x   /  AST  8<L>  /  ALT  6<L>  /  AlkPhos  96  11-07    proBNP:   Lipid Profile:   HgA1c: Hemoglobin A1C, Whole Blood: 11.7 % (11-07 @ 09:14)    TSH:

## 2019-11-08 ENCOUNTER — RESULT REVIEW (OUTPATIENT)
Age: 64
End: 2019-11-08

## 2019-11-08 LAB
ALBUMIN FLD-MCNC: 2.2 G/DL — SIGNIFICANT CHANGE UP
ALBUMIN SERPL ELPH-MCNC: 2.9 G/DL — LOW (ref 3.3–5)
ALP SERPL-CCNC: 83 U/L — SIGNIFICANT CHANGE UP (ref 40–120)
ALT FLD-CCNC: <5 U/L — LOW (ref 10–45)
ANION GAP SERPL CALC-SCNC: 14 MMOL/L — SIGNIFICANT CHANGE UP (ref 5–17)
AST SERPL-CCNC: 9 U/L — LOW (ref 10–40)
B PERT IGG+IGM PNL SER: ABNORMAL
BASE EXCESS BLDV CALC-SCNC: 9.3 MMOL/L — HIGH (ref -2–2)
BILIRUB SERPL-MCNC: 0.5 MG/DL — SIGNIFICANT CHANGE UP (ref 0.2–1.2)
BUN SERPL-MCNC: 63 MG/DL — HIGH (ref 7–23)
CALCIUM SERPL-MCNC: 9 MG/DL — SIGNIFICANT CHANGE UP (ref 8.4–10.5)
CHLORIDE SERPL-SCNC: 88 MMOL/L — LOW (ref 96–108)
CO2 BLDV-SCNC: 39 MMOL/L — HIGH (ref 22–30)
CO2 SERPL-SCNC: 32 MMOL/L — HIGH (ref 22–31)
COLOR FLD: SIGNIFICANT CHANGE UP
CREAT SERPL-MCNC: 2.01 MG/DL — HIGH (ref 0.5–1.3)
EOSINOPHIL # FLD: 2 % — SIGNIFICANT CHANGE UP
FLUID INTAKE SUBSTANCE CLASS: SIGNIFICANT CHANGE UP
FLUID SEGMENTED GRANULOCYTES: 11 % — SIGNIFICANT CHANGE UP
GAS PNL BLDV: SIGNIFICANT CHANGE UP
GLUCOSE BLDC GLUCOMTR-MCNC: 184 MG/DL — HIGH (ref 70–99)
GLUCOSE BLDC GLUCOMTR-MCNC: 201 MG/DL — HIGH (ref 70–99)
GLUCOSE BLDC GLUCOMTR-MCNC: 233 MG/DL — HIGH (ref 70–99)
GLUCOSE BLDC GLUCOMTR-MCNC: 277 MG/DL — HIGH (ref 70–99)
GLUCOSE FLD-MCNC: 209 MG/DL — SIGNIFICANT CHANGE UP
GLUCOSE SERPL-MCNC: 231 MG/DL — HIGH (ref 70–99)
GRAM STN FLD: SIGNIFICANT CHANGE UP
HCO3 BLDV-SCNC: 37 MMOL/L — HIGH (ref 21–29)
HCT VFR BLD CALC: 47.8 % — SIGNIFICANT CHANGE UP (ref 39–50)
HGB BLD-MCNC: 14.5 G/DL — SIGNIFICANT CHANGE UP (ref 13–17)
LDH SERPL L TO P-CCNC: 720 U/L — SIGNIFICANT CHANGE UP
LYMPHOCYTES # FLD: 77 % — SIGNIFICANT CHANGE UP
MCHC RBC-ENTMCNC: 25.2 PG — LOW (ref 27–34)
MCHC RBC-ENTMCNC: 30.3 GM/DL — LOW (ref 32–36)
MCV RBC AUTO: 83 FL — SIGNIFICANT CHANGE UP (ref 80–100)
MESOTHL CELL # FLD: 2 % — SIGNIFICANT CHANGE UP
MONOS+MACROS # FLD: 8 % — SIGNIFICANT CHANGE UP
PCO2 BLDV: 63 MMHG — HIGH (ref 35–50)
PH BLDV: 7.38 — SIGNIFICANT CHANGE UP (ref 7.35–7.45)
PH FLD: 7.74 — SIGNIFICANT CHANGE UP
PLATELET # BLD AUTO: 250 K/UL — SIGNIFICANT CHANGE UP (ref 150–400)
PO2 BLDV: 54 MMHG — HIGH (ref 25–45)
POTASSIUM SERPL-MCNC: 3.5 MMOL/L — SIGNIFICANT CHANGE UP (ref 3.5–5.3)
POTASSIUM SERPL-SCNC: 3.5 MMOL/L — SIGNIFICANT CHANGE UP (ref 3.5–5.3)
PROT FLD-MCNC: 3.9 G/DL — SIGNIFICANT CHANGE UP
PROT SERPL-MCNC: 6.8 G/DL — SIGNIFICANT CHANGE UP (ref 6–8.3)
RBC # BLD: 5.76 M/UL — SIGNIFICANT CHANGE UP (ref 4.2–5.8)
RBC # FLD: 16.1 % — HIGH (ref 10.3–14.5)
RCV VOL RI: HIGH /UL (ref 0–5)
SAO2 % BLDV: 87 % — SIGNIFICANT CHANGE UP (ref 67–88)
SODIUM SERPL-SCNC: 134 MMOL/L — LOW (ref 135–145)
SPECIMEN SOURCE FLD: SIGNIFICANT CHANGE UP
SPECIMEN SOURCE FLD: SIGNIFICANT CHANGE UP
SPECIMEN SOURCE: SIGNIFICANT CHANGE UP
TOTAL NUCLEATED CELL COUNT, BODY FLUID: 1800 /UL — HIGH (ref 0–5)
TUBE TYPE: SIGNIFICANT CHANGE UP
WBC # BLD: 10.77 K/UL — HIGH (ref 3.8–10.5)
WBC # FLD AUTO: 10.77 K/UL — HIGH (ref 3.8–10.5)

## 2019-11-08 PROCEDURE — 71045 X-RAY EXAM CHEST 1 VIEW: CPT | Mod: 26

## 2019-11-08 PROCEDURE — 32555 ASPIRATE PLEURA W/ IMAGING: CPT | Mod: RT

## 2019-11-08 PROCEDURE — 88305 TISSUE EXAM BY PATHOLOGIST: CPT | Mod: 26

## 2019-11-08 PROCEDURE — 99232 SBSQ HOSP IP/OBS MODERATE 35: CPT | Mod: GC

## 2019-11-08 PROCEDURE — 71250 CT THORAX DX C-: CPT | Mod: 26

## 2019-11-08 PROCEDURE — 88112 CYTOPATH CELL ENHANCE TECH: CPT | Mod: 26

## 2019-11-08 RX ORDER — INSULIN GLARGINE 100 [IU]/ML
48 INJECTION, SOLUTION SUBCUTANEOUS AT BEDTIME
Refills: 0 | Status: DISCONTINUED | OUTPATIENT
Start: 2019-11-08 | End: 2019-11-09

## 2019-11-08 RX ORDER — INSULIN LISPRO 100/ML
12 VIAL (ML) SUBCUTANEOUS
Refills: 0 | Status: DISCONTINUED | OUTPATIENT
Start: 2019-11-08 | End: 2019-11-09

## 2019-11-08 RX ORDER — INSULIN LISPRO 100/ML
18 VIAL (ML) SUBCUTANEOUS
Refills: 0 | Status: DISCONTINUED | OUTPATIENT
Start: 2019-11-08 | End: 2019-11-09

## 2019-11-08 RX ADMIN — Medication 12 UNIT(S): at 17:37

## 2019-11-08 RX ADMIN — Medication 100 MILLIGRAM(S): at 05:53

## 2019-11-08 RX ADMIN — OXYCODONE HYDROCHLORIDE 5 MILLIGRAM(S): 5 TABLET ORAL at 20:12

## 2019-11-08 RX ADMIN — OXYCODONE HYDROCHLORIDE 5 MILLIGRAM(S): 5 TABLET ORAL at 21:14

## 2019-11-08 RX ADMIN — Medication 81 MILLIGRAM(S): at 12:10

## 2019-11-08 RX ADMIN — HEPARIN SODIUM 5000 UNIT(S): 5000 INJECTION INTRAVENOUS; SUBCUTANEOUS at 17:38

## 2019-11-08 RX ADMIN — Medication 10 UNIT(S): at 12:08

## 2019-11-08 RX ADMIN — BUMETANIDE 2 MILLIGRAM(S): 0.25 INJECTION INTRAMUSCULAR; INTRAVENOUS at 05:53

## 2019-11-08 RX ADMIN — Medication 4: at 07:57

## 2019-11-08 RX ADMIN — OXYCODONE HYDROCHLORIDE 5 MILLIGRAM(S): 5 TABLET ORAL at 07:56

## 2019-11-08 RX ADMIN — OXYCODONE HYDROCHLORIDE 5 MILLIGRAM(S): 5 TABLET ORAL at 08:26

## 2019-11-08 RX ADMIN — Medication 4: at 12:08

## 2019-11-08 RX ADMIN — Medication 2: at 21:25

## 2019-11-08 RX ADMIN — Medication 100 MILLIGRAM(S): at 17:38

## 2019-11-08 RX ADMIN — INSULIN GLARGINE 48 UNIT(S): 100 INJECTION, SOLUTION SUBCUTANEOUS at 21:25

## 2019-11-08 RX ADMIN — Medication 15 UNIT(S): at 07:57

## 2019-11-08 RX ADMIN — Medication 2: at 17:38

## 2019-11-08 NOTE — PROGRESS NOTE ADULT - SUBJECTIVE AND OBJECTIVE BOX
Follow-up Pulm Progress Note    No new respiratory events overnight  Mild SOB  Sats 94% 2LNC    Medications:  MEDICATIONS  (STANDING):  aspirin enteric coated 81 milliGRAM(s) Oral daily  buMETAnide 2 milliGRAM(s) Oral daily  dextrose 5%. 1000 milliLiter(s) (50 mL/Hr) IV Continuous <Continuous>  dextrose 50% Injectable 12.5 Gram(s) IV Push once  dextrose 50% Injectable 25 Gram(s) IV Push once  dextrose 50% Injectable 25 Gram(s) IV Push once  heparin  Injectable 5000 Unit(s) SubCutaneous every 12 hours  influenza   Vaccine 0.5 milliLiter(s) IntraMuscular once  insulin glargine Injectable (LANTUS) 40 Unit(s) SubCutaneous at bedtime  insulin lispro (HumaLOG) corrective regimen sliding scale   SubCutaneous at bedtime  insulin lispro (HumaLOG) corrective regimen sliding scale   SubCutaneous three times a day before meals  insulin lispro Injectable (HumaLOG) 10 Unit(s) SubCutaneous before lunch  insulin lispro Injectable (HumaLOG) 15 Unit(s) SubCutaneous before breakfast  insulin lispro Injectable (HumaLOG) 10 Unit(s) SubCutaneous before dinner  metolazone 5 milliGRAM(s) Oral daily  metoprolol succinate  milliGRAM(s) Oral daily  pregabalin 100 milliGRAM(s) Oral two times a day    MEDICATIONS  (PRN):  acetaminophen   Tablet .. 650 milliGRAM(s) Oral every 4 hours PRN Moderate Pain (4 - 6)  dextrose 40% Gel 15 Gram(s) Oral once PRN Blood Glucose LESS THAN 70 milliGRAM(s)/deciliter  glucagon  Injectable 1 milliGRAM(s) IntraMuscular once PRN Glucose LESS THAN 70 milligrams/deciliter  oxyCODONE    IR 5 milliGRAM(s) Oral two times a day PRN Severe Pain (7 - 10)          Vital Signs Last 24 Hrs  T(C): 36.3 (08 Nov 2019 12:13), Max: 37 (07 Nov 2019 16:08)  T(F): 97.4 (08 Nov 2019 12:13), Max: 98.6 (07 Nov 2019 16:08)  HR: 94 (08 Nov 2019 12:13) (73 - 106)  BP: 151/84 (08 Nov 2019 12:13) (113/67 - 153/73)  BP(mean): --  RR: 18 (08 Nov 2019 12:13) (18 - 18)  SpO2: 93% (08 Nov 2019 12:13) (92% - 97%)      VBG pH 7.38 11-08 @ 06:12    VBG pCO2 63 11-08 @ 06:12    VBG O2 sat 87 11-08 @ 06:12    VBG lactate -- 11-08 @ 06:12      11-07 @ 07:01  -  11-08 @ 07:00  --------------------------------------------------------  IN: 840 mL / OUT: 1450 mL / NET: -610 mL          LABS:                        14.5   10.77 )-----------( 250      ( 08 Nov 2019 08:15 )             47.8     11-08    134<L>  |  88<L>  |  63<H>  ----------------------------<  231<H>  3.5   |  32<H>  |  2.01<H>    Ca    9.0      08 Nov 2019 06:24    TPro  6.8  /  Alb  2.9<L>  /  TBili  0.5  /  DBili  x   /  AST  9<L>  /  ALT  <5<L>  /  AlkPhos  83  11-08          CAPILLARY BLOOD GLUCOSE      POCT Blood Glucose.: 233 mg/dL (08 Nov 2019 12:06)          Serum Pro-Brain Natriuretic Peptide: 1310 pg/mL (11-05-19 @ 13:30)          Physical Examination:  PULM: diminished BS R base  CVS: irreg irreg    RADIOLOGY REVIEWED  CT chest:   FINDINGS:     Secretions in the upper trachea.  Interval increase in size of partially   loculated moderate right pleural effusion with near complete compressive   atelectasis of the right lower lobe with interval progression. Suggestion   of right pleural thickening although evaluation of the pleural surface is   limited due to lack of intravenous contrast. Areas of compressive or   rounded atelectasis involving the lateral segment of the right middle   lobe. Lingular and left lower lobe subsegmental atelectasis.  Interval   resolution of left pleural effusion. Scattered bilateral calcified   granulomas. No pneumothorax.    Chronic elevation of the left hemidiaphragm.    Prominent right supraclavicular lymph node as seen in 2017. No axillary   or mediastinal lymphadenopathy.    The heart size is enlarged, particularly left atrium. No pericardial   effusion. Coronary artery and aortic valvular calcifications.   Atherosclerotic calcifications of the aorta. Main pulmonary artery is   dilated measuring up to 3.5 cm at the level of bifurcation which can be   seen in the setting of pulmonary hypertension.    Imagedportions of the upper abdomen is unremarkable.    Thoracic kyphosis with degenerative changes.    IMPRESSION:     Interval increase in size of partially loculated moderate right pleural   effusion with compressive atelectasis of a large portion of the right   lower lobe with interval progression. No pneumothorax.    Evaluation of the right pleural surface is limited due to lack of   intravenous contrast.

## 2019-11-08 NOTE — PROGRESS NOTE ADULT - ASSESSMENT
63 yo M with history of uncontrolled DM2 c/b neuropathy, CKD3, macrovascular disease (CAD, TIA, left carotid artery stenosis), Hypertension, Hyperlipidemia, Atrial Fibrillation on Pradaxa, CHF presenting with SOB and cough secondary to right hydropneumothorax. Endocrine consult requested for management of hyperglycemia in patient with DM2 A1c >10% (high risk patient with high level decision-making). Overnight patient BG improved, 138- 288.

## 2019-11-08 NOTE — PROGRESS NOTE ADULT - SUBJECTIVE AND OBJECTIVE BOX
Chief Complaint:  Hyperglycemia in patient with uncontrolled DM 2    History: Patient seen and examined at bedside.     MEDICATIONS  (STANDING):  aspirin enteric coated 81 milliGRAM(s) Oral daily  buMETAnide 2 milliGRAM(s) Oral daily  dextrose 5%. 1000 milliLiter(s) (50 mL/Hr) IV Continuous <Continuous>  dextrose 50% Injectable 12.5 Gram(s) IV Push once  dextrose 50% Injectable 25 Gram(s) IV Push once  dextrose 50% Injectable 25 Gram(s) IV Push once  heparin  Injectable 5000 Unit(s) SubCutaneous every 12 hours  influenza   Vaccine 0.5 milliLiter(s) IntraMuscular once  insulin glargine Injectable (LANTUS) 40 Unit(s) SubCutaneous at bedtime  insulin lispro (HumaLOG) corrective regimen sliding scale   SubCutaneous at bedtime  insulin lispro (HumaLOG) corrective regimen sliding scale   SubCutaneous three times a day before meals  insulin lispro Injectable (HumaLOG) 10 Unit(s) SubCutaneous before lunch  insulin lispro Injectable (HumaLOG) 15 Unit(s) SubCutaneous before breakfast  insulin lispro Injectable (HumaLOG) 10 Unit(s) SubCutaneous before dinner  metolazone 5 milliGRAM(s) Oral daily  metoprolol succinate  milliGRAM(s) Oral daily  pregabalin 100 milliGRAM(s) Oral two times a day    MEDICATIONS  (PRN):  acetaminophen   Tablet .. 650 milliGRAM(s) Oral every 4 hours PRN Moderate Pain (4 - 6)  dextrose 40% Gel 15 Gram(s) Oral once PRN Blood Glucose LESS THAN 70 milliGRAM(s)/deciliter  glucagon  Injectable 1 milliGRAM(s) IntraMuscular once PRN Glucose LESS THAN 70 milligrams/deciliter  oxyCODONE    IR 5 milliGRAM(s) Oral two times a day PRN Severe Pain (7 - 10)    PHYSICAL EXAM:  VITALS: T(C): 36.5 (11-08-19 @ 08:33)  T(F): 97.7 (11-08-19 @ 08:33), Max: 98.6 (11-07-19 @ 16:08)  HR: 95 (11-08-19 @ 08:33) (73 - 106)  BP: 120/68 (11-08-19 @ 08:33) (113/67 - 153/73)  RR:  (18 - 18)  SpO2:  (92% - 97%)  Wt(kg): 132kg   GENERAL: NAD, well-groomed, well-developed  RESPIRATORY: Clear to auscultation bilaterally; No rales, rhonchi, wheezing, or rubs  CARDIOVASCULAR: Regular rate and rhythm; No murmurs; no peripheral edema  PSYCH: Alert and oriented x 3, reactive affect     POCT Blood Glucose.: 201 mg/dL (11-08-19 @ 07:28) H15 +4  POCT Blood Glucose.: 238 mg/dL (11-07-19 @ 21:06) L40  POCT Blood Glucose.: 138 mg/dL (11-07-19 @ 16:50) H 10  POCT Blood Glucose.: 288 mg/dL (11-07-19 @ 11:26) H 10+6  POCT Blood Glucose.: 353 mg/dL (11-07-19 @ 07:46) H10+10  POCT Blood Glucose.: 331 mg/dL (11-06-19 @ 21:09)  POCT Blood Glucose.: 272 mg/dL (11-06-19 @ 16:35)  POCT Blood Glucose.: 394 mg/dL (11-06-19 @ 12:57)  POCT Blood Glucose.: 352 mg/dL (11-06-19 @ 07:45)  POCT Blood Glucose.: 370 mg/dL (11-05-19 @ 21:02)  POCT Blood Glucose.: 248 mg/dL (11-05-19 @ 17:40)  POCT Blood Glucose.: 336 mg/dL (11-05-19 @ 15:15)      11-08    134<L>  |  88<L>  |  63<H>  ----------------------------<  231<H>  3.5   |  32<H>  |  2.01<H>    EGFR if : 40<L>  EGFR if non : 34<L>    Ca    9.0      11-08    TPro  6.8  /  Alb  2.9<L>  /  TBili  0.5  /  DBili  x   /  AST  9<L>  /  ALT  <5<L>  /  AlkPhos  83  11-08    Hemoglobin A1C, Whole Blood: 11.7 % <H> [4.0 - 5.6] (11-07-19 @ 09:14)  Hemoglobin A1C, Whole Blood: 10.3 % <H> [4.0 - 5.6] (10-17-19 @ 07:43) Chief Complaint:  Hyperglycemia in patient with uncontrolled DM 2    History: Patient seen and examined at bedside. Reports tolerating diet, no nausea or vomiting, diarrhea or abdominal pain. Pending IR right pleural effusion drainage and and pigtail placement. No lightheadedness or disorientation reported after meals.     MEDICATIONS  (STANDING):  aspirin enteric coated 81 milliGRAM(s) Oral daily  buMETAnide 2 milliGRAM(s) Oral daily  dextrose 5%. 1000 milliLiter(s) (50 mL/Hr) IV Continuous <Continuous>  dextrose 50% Injectable 12.5 Gram(s) IV Push once  dextrose 50% Injectable 25 Gram(s) IV Push once  dextrose 50% Injectable 25 Gram(s) IV Push once  heparin  Injectable 5000 Unit(s) SubCutaneous every 12 hours  influenza   Vaccine 0.5 milliLiter(s) IntraMuscular once  insulin glargine Injectable (LANTUS) 40 Unit(s) SubCutaneous at bedtime  insulin lispro (HumaLOG) corrective regimen sliding scale   SubCutaneous at bedtime  insulin lispro (HumaLOG) corrective regimen sliding scale   SubCutaneous three times a day before meals  insulin lispro Injectable (HumaLOG) 10 Unit(s) SubCutaneous before lunch  insulin lispro Injectable (HumaLOG) 15 Unit(s) SubCutaneous before breakfast  insulin lispro Injectable (HumaLOG) 10 Unit(s) SubCutaneous before dinner  metolazone 5 milliGRAM(s) Oral daily  metoprolol succinate  milliGRAM(s) Oral daily  pregabalin 100 milliGRAM(s) Oral two times a day    MEDICATIONS  (PRN):  acetaminophen   Tablet .. 650 milliGRAM(s) Oral every 4 hours PRN Moderate Pain (4 - 6)  dextrose 40% Gel 15 Gram(s) Oral once PRN Blood Glucose LESS THAN 70 milliGRAM(s)/deciliter  glucagon  Injectable 1 milliGRAM(s) IntraMuscular once PRN Glucose LESS THAN 70 milligrams/deciliter  oxyCODONE    IR 5 milliGRAM(s) Oral two times a day PRN Severe Pain (7 - 10)    PHYSICAL EXAM:  VITALS: T(C): 36.5 (11-08-19 @ 08:33)  T(F): 97.7 (11-08-19 @ 08:33), Max: 98.6 (11-07-19 @ 16:08)  HR: 95 (11-08-19 @ 08:33) (73 - 106)  BP: 120/68 (11-08-19 @ 08:33) (113/67 - 153/73)  RR:  (18 - 18)  SpO2:  (92% - 97%)  Wt(kg): 132kg   GENERAL: NAD, well-groomed, well-developed  RESPIRATORY: Clear to auscultation bilaterally; No rales, rhonchi, wheezing, or rubs  CARDIOVASCULAR: Regular rate and rhythm; No murmurs; no peripheral edema  PSYCH: Alert and oriented x 3, reactive affect     POCT Blood Glucose.: 201 mg/dL (11-08-19 @ 07:28) H15 +4  POCT Blood Glucose.: 238 mg/dL (11-07-19 @ 21:06) L40  POCT Blood Glucose.: 138 mg/dL (11-07-19 @ 16:50) H 10  POCT Blood Glucose.: 288 mg/dL (11-07-19 @ 11:26) H 10+6  POCT Blood Glucose.: 353 mg/dL (11-07-19 @ 07:46) H10+10  POCT Blood Glucose.: 331 mg/dL (11-06-19 @ 21:09)  POCT Blood Glucose.: 272 mg/dL (11-06-19 @ 16:35)  POCT Blood Glucose.: 394 mg/dL (11-06-19 @ 12:57)  POCT Blood Glucose.: 352 mg/dL (11-06-19 @ 07:45)  POCT Blood Glucose.: 370 mg/dL (11-05-19 @ 21:02)  POCT Blood Glucose.: 248 mg/dL (11-05-19 @ 17:40)  POCT Blood Glucose.: 336 mg/dL (11-05-19 @ 15:15)      11-08    134<L>  |  88<L>  |  63<H>  ----------------------------<  231<H>  3.5   |  32<H>  |  2.01<H>    EGFR if : 40<L>  EGFR if non : 34<L>    Ca    9.0      11-08    TPro  6.8  /  Alb  2.9<L>  /  TBili  0.5  /  DBili  x   /  AST  9<L>  /  ALT  <5<L>  /  AlkPhos  83  11-08    Hemoglobin A1C, Whole Blood: 11.7 % <H> [4.0 - 5.6] (11-07-19 @ 09:14)  Hemoglobin A1C, Whole Blood: 10.3 % <H> [4.0 - 5.6] (10-17-19 @ 07:43)

## 2019-11-08 NOTE — PROGRESS NOTE ADULT - ASSESSMENT
65 y/o M with PMH of CAD s/p stents, HFrEF (EF 40% 12/2018), DMT2, HTN, CKD, NSTEMI, Afib (on Pradaxa). Presents to ED with worsening SOB and productive cough with clear/whitish sputum for the past week. He was recently hospitalized last month and found to have small R hydropneumothorax of unclear etiology. Plans previous admission for drainage of effusion and sampling of fluid and analysis, but patient refused at that time, as he watned to go to his son's wedding. He did not present for outpt visit. CT chest with increase in R pl effusion.

## 2019-11-08 NOTE — PROGRESS NOTE ADULT - ASSESSMENT
65 y/o M with PMH of CAD s/p stents, HFrEF (EF 40% 12/2018), DMT2, HTN, CKD, NSTEMI, Afib (on Pradaxa). Presents to ED with worsening SOB and productive cough with .   pt  recently hospitalized last month and found to have small R hydropneumothorax of unclear etiology. Plans previous admission for drainage of effusion and sampling of fluid and analysis, but patient refused at that time and agreed for outpt f/u. presents now w/ sob       Problem: Hydropneumothorax.   CT from previous admission with R sided hydropneumothorax of unclear etiology.  -Pt refused drainage at that time.  ir drainage thoro today   holding  pradaxa       ·  Problem: CHF (congestive heart failure).  Recommendation: -Keep O>I as tolerated.   c/w current meds  cards eval noted    dm /uncontrolled  endo eval noted  fsg riss  c/w insulin regimen as per endo        dm neuropathy  c/w lyrica  oxycodone for severe pain     alex/ ckd  renal f/u   c/w recs as per renal

## 2019-11-08 NOTE — PROGRESS NOTE ADULT - ASSESSMENT
64 year old male with CKD Hypertension, Hyperlipidemia, Atrial Fibrillation, s/p DCCV, Coronary Artery Disease, NSTEMI, (2/2014), s/p PCI to mid LAD, Proximal LCx, Distal LCx (2/14, [facility], LULU), Congestive Heart Failure,  Diabetes Mellitus, CVA, Severe Left Internal Carotid Disease, s/p CEA (2/2014) recently admitted for hydropneumothorax, a drainage of effusion and sampling of fluid and analysis was suggested however patient refused at that time.  Pt. presents  today with worsening SOB and cough with worsening pl. effusion         1- ckd III   2- proteinuria   3- chf   4- HTN   5- pleural effusion   6- a fib    creatinine slightly higher   will need arb in near future for proteinuria   bumex 2 mg po qd   metolazone 5 mg qd to  change to qod  in am  thoracentesis today   hypokalemia k still borderline  kcl 20 meq   cont toprol xl 100 mg qd

## 2019-11-08 NOTE — PROGRESS NOTE ADULT - SUBJECTIVE AND OBJECTIVE BOX
[facility]   [phone]  RENAL FOLLOW UP NOTE  --------------------------------------------------------------------------------  Chief Complaint:    24 hour events/subjective:    seen earlier. still with Dyspnea    PAST HISTORY  --------------------------------------------------------------------------------  No significant changes to PMH, PSH, FHx, SHx, unless otherwise noted    ALLERGIES & MEDICATIONS  --------------------------------------------------------------------------------  Allergies    No Known Allergies    Intolerances      Standing Inpatient Medications  aspirin enteric coated 81 milliGRAM(s) Oral daily  buMETAnide 2 milliGRAM(s) Oral daily  dextrose 5%. 1000 milliLiter(s) IV Continuous <Continuous>  dextrose 50% Injectable 12.5 Gram(s) IV Push once  dextrose 50% Injectable 25 Gram(s) IV Push once  dextrose 50% Injectable 25 Gram(s) IV Push once  heparin  Injectable 5000 Unit(s) SubCutaneous every 12 hours  influenza   Vaccine 0.5 milliLiter(s) IntraMuscular once  insulin glargine Injectable (LANTUS) 48 Unit(s) SubCutaneous at bedtime  insulin lispro (HumaLOG) corrective regimen sliding scale   SubCutaneous at bedtime  insulin lispro (HumaLOG) corrective regimen sliding scale   SubCutaneous three times a day before meals  insulin lispro Injectable (HumaLOG) 18 Unit(s) SubCutaneous before breakfast  insulin lispro Injectable (HumaLOG) 12 Unit(s) SubCutaneous before dinner  insulin lispro Injectable (HumaLOG) 10 Unit(s) SubCutaneous before lunch  metolazone 5 milliGRAM(s) Oral daily  metoprolol succinate  milliGRAM(s) Oral daily  pregabalin 100 milliGRAM(s) Oral two times a day    PRN Inpatient Medications  acetaminophen   Tablet .. 650 milliGRAM(s) Oral every 4 hours PRN  dextrose 40% Gel 15 Gram(s) Oral once PRN  glucagon  Injectable 1 milliGRAM(s) IntraMuscular once PRN  oxyCODONE    IR 5 milliGRAM(s) Oral two times a day PRN      REVIEW OF SYSTEMS  --------------------------------------------------------------------------------    Gen: denies fevers/chills,  CVS: denies chest pain/palpitations  Resp: denies worsening  SOB/Cough  GI: Denies N/V/Abd pain  : Denies dysuria/oliguria/hematuria    All other systems were reviewed and are negative, except as noted.    VITALS/PHYSICAL EXAM  --------------------------------------------------------------------------------  T(C): 36.3 (11-08-19 @ 12:13), Max: 36.9 (11-08-19 @ 04:55)  HR: 94 (11-08-19 @ 12:13) (73 - 106)  BP: 151/84 (11-08-19 @ 12:13) (113/67 - 153/73)  RR: 18 (11-08-19 @ 12:13) (18 - 18)  SpO2: 93% (11-08-19 @ 12:13) (92% - 97%)  Wt(kg): --        11-07-19 @ 07:01  -  11-08-19 @ 07:00  --------------------------------------------------------  IN: 840 mL / OUT: 1450 mL / NET: -610 mL    11-08-19 @ 07:01  -  11-08-19 @ 16:55  --------------------------------------------------------  IN: 600 mL / OUT: 300 mL / NET: 300 mL      Physical Exam:  	  	  Gen:  + obese appears with mildly tachypneic  on O2    	no jvd   	Pulm: decrease bs  R base upto 3/4  no rales or ronchi or wheezing  	CV: RRR, S1S2; no rub  	Abd: +BS, soft, nontender/nondistended  	: No suprapubic tenderness  	UE: Warm, no cyanosis  no clubbing,  no edema  	LE: Warm, no cyanosis  no clubbing, no edema    	  LABS/STUDIES  --------------------------------------------------------------------------------              14.5   10.77 >-----------<  250      [11-08-19 @ 08:15]              47.8     134  |  88  |  63  ----------------------------<  231      [11-08-19 @ 06:24]  3.5   |  32  |  2.01        Ca     9.0     [11-08-19 @ 06:24]    TPro  6.8  /  Alb  2.9  /  TBili  0.5  /  DBili  x   /  AST  9   /  ALT  <5  /  AlkPhos  83  [11-08-19 @ 06:24]              [11-07-19 @ 06:23]    Creatinine Trend:  SCr 2.01 [11-08 @ 06:24]  SCr 2.02 [11-07 @ 06:23]  SCr 2.02 [11-06 @ 06:26]  SCr 1.80 [11-05 @ 15:07]  SCr 1.83 [11-05 @ 13:30]              Urinalysis - [10-17-19 @ 04:40]      Color Light Yellow / Appearance Clear / SG 1.015 / pH 6.5      Gluc 500 mg/dL / Ketone Negative  / Bili Negative / Urobili Negative       Blood Small / Protein 300 mg/dL / Leuk Est Negative / Nitrite Negative      RBC 8 / WBC 3 / Hyaline 0 / Gran  / Sq Epi  / Non Sq Epi 0 / Bacteria Negative    Urine Creatinine 76      [11-06-19 @ 08:10]  Urine Protein 134      [11-06-19 @ 08:10]    HbA1c 11.7      [11-07-19 @ 09:14]  TSH 1.71      [12-28-18 @ 14:58]

## 2019-11-08 NOTE — PROGRESS NOTE ADULT - PROBLEM SELECTOR PLAN 1
CT chest with increase in R pl effusion with compressive atelectasis   -Previously noted hydropneumothorax on CT from 10/16, not present on current CT  -Plan for R thoracentesis today. Send fluid studies, culture, cyto. Given concern for possible trapped lung, plan to drain ~500cc. D/w Dr. Lucero.  -Plan for CT chest non contrast after thoracentesis  -Will obtain thoracic consult after repeat CT chest done to evaluate for possible decortication.   -Pradaxa held for procedure for 72 hrs  -Keep sats >92% with supplemental O2 as needed

## 2019-11-08 NOTE — PROGRESS NOTE ADULT - SUBJECTIVE AND OBJECTIVE BOX
Interventional Radiology Brief- Operative Note    Procedure: Thoracentesis    Operators: Dr. Lucero; Dr. Brush    Anesthesia (type): Local    Contrast: None    EBL: None    Findings/Follow up Plan of Care: Limited pre-procedural ultrasound of the right lung base showed moderate amount of complex fluid with internal echoes. Approximately 490 cc of dark red tinged fluid aspirated from right pleural space. Chest xray to follow.    Specimens Removed: 490 cc of dark red tinged fluid aspirated from right pleural space    Implants: None    Complications: None    Condition/Disposition: Stable -> Floor. Chest X-Ray to follow. Findings discussed with Dr. El by Dr. Lucero at [id number] dated 11/8/19.    Please call Interventional Radiology x [phone] with any questions, concerns, or issues.

## 2019-11-08 NOTE — PROGRESS NOTE ADULT - ASSESSMENT
64 year old male with Hypertension, Hyperlipidemia, Atrial Fibrillation, s/p DCCV, Coronary Artery Disease, NSTEMI, (2/2014), s/p PCI to mid LAD, Proximal LCx, Distal LCx (2/14, [facility], LULU), Congestive Heart Failure, Normal Ejection Fraction, Diabetes Mellitus, CVA, Severe Left Internal Carotid Disease, s/p CEA (2/2014) presenting with worsening SOB, cough.     1.Right-sided Pleural effusion  -Chest CT reveals interval increase in pleural effusion w no evidence of hydropneumothorax  -await IR drain, f/u studies    2. Chronic Congestive heart failure, Diastolic.   -stable, minimal LE edema, no evidence of ADHF on exam   -continue current diuresis regimen   -recent echo with no evidence of pericardial effusion, mod LAE, grossly borderline LV sys dysfx EF 45% likely underestimated due to AF     3. Atrial Fibrillation, hx  -stable, c/w metoprolol succinate ER 100mg daily  -pradaxa on hold for thoracentesis     4. Coronary Artery Disease. S/P PCI to LAD, LCx.  -cv stable w/o chest pain  -Continue ASA 81mg and statin    dvt ppx

## 2019-11-08 NOTE — PROGRESS NOTE ADULT - PROBLEM SELECTOR PLAN 1
uncontrolled DM2 c/b neuropathy, CKD3, macrovascular disease (CAD and CVA)  A1c 11.7%, goal A1c ~7  FS goal 100-180, above goal however improved   FS premeal and qhs  carb consistent diet  Will increase Lantus to 45 units qhs   Will increase to Humalog 15/10/12 units premeal (hold if NPO)   Recommend mod premeal and qhs sliding scale   Patient to follow up with Endocrinologist, Dr Real, on discharge  Continue Basal/bolus, dose to be determined  Patient to follow up with  ophthalmology as outpt uncontrolled DM2 c/b neuropathy, CKD3, macrovascular disease (CAD and CVA)  A1c 11.7%, goal A1c ~7  FS goal 100-180, above goal however improved   FS premeal and qhs  carb consistent diet  Will increase Lantus to 48 units qhs   Will increase to Humalog 18/10/12 units premeal (hold if NPO)   Recommend mod premeal and qhs sliding scale   Patient to follow up with Endocrinologist, Dr Real, on discharge  Continue Basal/bolus, dose to be determined  Patient to follow up with  ophthalmology as outpt

## 2019-11-08 NOTE — PROGRESS NOTE ADULT - SUBJECTIVE AND OBJECTIVE BOX
CARDIOLOGY FOLLOW UP NOTE - DR. SAGASTUME    Subjective:    pt was in IR procedure     PHYSICAL EXAM:  T(C): 36.9 (11-08-19 @ 17:35), Max: 36.9 (11-08-19 @ 04:55)  HR: 92 (11-08-19 @ 17:35) (73 - 106)  BP: 137/74 (11-08-19 @ 17:35) (113/67 - 153/73)  RR: 19 (11-08-19 @ 17:35) (18 - 19)  SpO2: 97% (11-08-19 @ 17:35) (92% - 97%)  Wt(kg): --  I&O's Summary    07 Nov 2019 07:01  -  08 Nov 2019 07:00  --------------------------------------------------------  IN: 840 mL / OUT: 1450 mL / NET: -610 mL    08 Nov 2019 07:01  -  08 Nov 2019 18:24  --------------------------------------------------------  IN: 600 mL / OUT: 300 mL / NET: 300 mL      Daily     Daily     Appearance: Normal	  Cardiovascular: Normal S1 S2,RRR, No JVD, No murmurs  Respiratory: dec bs bases  Gastrointestinal:  Soft, Non-tender, + BS	  Extremities: Normal range of motion, edema      Home Medications:  aspirin 81 mg oral delayed release tablet: 1 tab(s) orally once a day (05 Nov 2019 15:27)  Basaglar KwikPen 100 units/mL subcutaneous solution: 25 unit(s) subcutaneous once a day (at bedtime) (05 Nov 2019 15:27)  bumetanide 2 mg oral tablet: 1 tab(s) orally once a day (05 Nov 2019 15:27)  dabigatran 150 mg oral capsule: 1 cap(s) orally 2 times a day (05 Nov 2019 15:27)  Lyrica 100 mg oral capsule: 1 cap(s) orally 2 times a day (05 Nov 2019 15:27)  metOLazone 5 mg oral tablet: 1 tab(s) orally Monday, Wednesday, and Friday (05 Nov 2019 15:27)  metoprolol succinate 50 mg oral tablet, extended release: 2 tab(s) orally once a day (100 mg) (05 Nov 2019 15:27)  NovoLOG FlexPen 100 units/mL injectable solution: unit(s) injectable 3 times a day (before meals) - Sliding Scale (05 Nov 2019 15:27)  Tylenol 325 mg oral tablet: 2 tab(s) orally every 4 hours, As Needed (05 Nov 2019 15:27)      MEDICATIONS  (STANDING):  aspirin enteric coated 81 milliGRAM(s) Oral daily  buMETAnide 2 milliGRAM(s) Oral daily  dextrose 5%. 1000 milliLiter(s) (50 mL/Hr) IV Continuous <Continuous>  dextrose 50% Injectable 12.5 Gram(s) IV Push once  dextrose 50% Injectable 25 Gram(s) IV Push once  dextrose 50% Injectable 25 Gram(s) IV Push once  heparin  Injectable 5000 Unit(s) SubCutaneous every 12 hours  influenza   Vaccine 0.5 milliLiter(s) IntraMuscular once  insulin glargine Injectable (LANTUS) 48 Unit(s) SubCutaneous at bedtime  insulin lispro (HumaLOG) corrective regimen sliding scale   SubCutaneous at bedtime  insulin lispro (HumaLOG) corrective regimen sliding scale   SubCutaneous three times a day before meals  insulin lispro Injectable (HumaLOG) 18 Unit(s) SubCutaneous before breakfast  insulin lispro Injectable (HumaLOG) 12 Unit(s) SubCutaneous before dinner  insulin lispro Injectable (HumaLOG) 10 Unit(s) SubCutaneous before lunch  metolazone 5 milliGRAM(s) Oral daily  metoprolol succinate  milliGRAM(s) Oral daily  pregabalin 100 milliGRAM(s) Oral two times a day      TELEMETRY: 	    ECG:  	  RADIOLOGY:   DIAGNOSTIC TESTING:  [ ] Echocardiogram:  [ ] Catheterization:  [ ] Stress Test:    OTHER: 	    LABS:	 	    CARDIAC MARKERS:  Troponin T, High Sensitivity Result: 95 ng/L (11-05 @ 15:07)  Troponin T, High Sensitivity Result: 88 ng/L (11-05 @ 13:30)                                14.5   10.77 )-----------( 250      ( 08 Nov 2019 08:15 )             47.8     11-08    134<L>  |  88<L>  |  63<H>  ----------------------------<  231<H>  3.5   |  32<H>  |  2.01<H>    Ca    9.0      08 Nov 2019 06:24    TPro  6.8  /  Alb  2.9<L>  /  TBili  0.5  /  DBili  x   /  AST  9<L>  /  ALT  <5<L>  /  AlkPhos  83  11-08    proBNP:     Lipid Profile:   HgA1c:     Creatinine, Serum: 2.01 mg/dL (11-08-19 @ 06:24)  Creatinine, Serum: 2.02 mg/dL (11-07-19 @ 06:23)  Creatinine, Serum: 2.02 mg/dL (11-06-19 @ 06:26) Patient

## 2019-11-08 NOTE — PROGRESS NOTE ADULT - SUBJECTIVE AND OBJECTIVE BOX
CHIEF COMPLAINT:Patient is a 64y old  Male who presents with a chief complaint of SOB and cough secondary to right pleural effusion (07 Nov 2019 14:56)    	        PAST MEDICAL & SURGICAL HISTORY:  Neuropathy  CAD (coronary artery disease)  MI (myocardial infarction): circa 2014  Atrial fibrillation  TIA (transient ischemic attack)  DM type 2 (diabetes mellitus, type 2)  HLD (hyperlipidemia)  HTN (hypertension), benign  S/P carotid endarterectomy: left  Status post angioplasty with stent: LULU x 3 2/7/2014          REVIEW OF SYSTEMS:  weak  EYES: No eye pain, visual disturbances, or discharge  NECK: No pain or stiffness  RESPIRATORY: dec sob   CARDIOVASCULAR: No chest pain, palpitations, passing out, dizziness,   GASTROINTESTINAL: No abdominal or epigastric pain. No nausea, vomiting, or hematemesis; No diarrhea or constipation. No melena or hematochezia.  GENITOURINARY: No dysuria, frequency, hematuria, or incontinence  NEUROLOGICAL: No headaches, memory loss, loss of strength, numbness, or tremors  MUSCULOSKELETAL: No joint pain or swelling; No muscle, back, or extremity pain    Medications:  MEDICATIONS  (STANDING):  aspirin enteric coated 81 milliGRAM(s) Oral daily  buMETAnide 2 milliGRAM(s) Oral daily  dextrose 5%. 1000 milliLiter(s) (50 mL/Hr) IV Continuous <Continuous>  dextrose 50% Injectable 12.5 Gram(s) IV Push once  dextrose 50% Injectable 25 Gram(s) IV Push once  dextrose 50% Injectable 25 Gram(s) IV Push once  heparin  Injectable 5000 Unit(s) SubCutaneous every 12 hours  influenza   Vaccine 0.5 milliLiter(s) IntraMuscular once  insulin glargine Injectable (LANTUS) 40 Unit(s) SubCutaneous at bedtime  insulin lispro (HumaLOG) corrective regimen sliding scale   SubCutaneous at bedtime  insulin lispro (HumaLOG) corrective regimen sliding scale   SubCutaneous three times a day before meals  insulin lispro Injectable (HumaLOG) 10 Unit(s) SubCutaneous before lunch  insulin lispro Injectable (HumaLOG) 15 Unit(s) SubCutaneous before breakfast  insulin lispro Injectable (HumaLOG) 10 Unit(s) SubCutaneous before dinner  metolazone 5 milliGRAM(s) Oral daily  metoprolol succinate  milliGRAM(s) Oral daily  pregabalin 100 milliGRAM(s) Oral two times a day    MEDICATIONS  (PRN):  acetaminophen   Tablet .. 650 milliGRAM(s) Oral every 4 hours PRN Moderate Pain (4 - 6)  dextrose 40% Gel 15 Gram(s) Oral once PRN Blood Glucose LESS THAN 70 milliGRAM(s)/deciliter  glucagon  Injectable 1 milliGRAM(s) IntraMuscular once PRN Glucose LESS THAN 70 milligrams/deciliter  oxyCODONE    IR 5 milliGRAM(s) Oral two times a day PRN Severe Pain (7 - 10)    	    PHYSICAL EXAM:  T(C): 36.5 (11-08-19 @ 08:33), Max: 37 (11-07-19 @ 16:08)  HR: 95 (11-08-19 @ 08:33) (73 - 106)  BP: 120/68 (11-08-19 @ 08:33) (113/67 - 153/73)  RR: 18 (11-08-19 @ 08:33) (18 - 18)  SpO2: 97% (11-08-19 @ 08:33) (92% - 97%)  Wt(kg): --  I&O's Summary    07 Nov 2019 07:01  -  08 Nov 2019 07:00  --------------------------------------------------------  IN: 840 mL / OUT: 1450 mL / NET: -610 mL        Appearance: Normal	  HEENT:   Normal oral mucosa, PERRL, EOMI	  Lymphatic: No lymphadenopathy  Cardiovascular: Normal S1 S2, No JVD, No murmurs, No edema  Respiratory:dec bs   Psychiatry: A & O   Gastrointestinal:  Soft, Non-tender, + BS	    Neurologic: Non-focal  Extremities: Normal range of motion, No clubbing  pvd   Vascular: Peripheral pulses palpable 2+ bilaterally    TELEMETRY: 	    ECG:  	  RADIOLOGY:  OTHER: 	  	  LABS:	 	    CARDIAC MARKERS:                                14.5   10.77 )-----------( 250      ( 08 Nov 2019 08:15 )             47.8     11-08    134<L>  |  88<L>  |  63<H>  ----------------------------<  231<H>  3.5   |  32<H>  |  2.01<H>    Ca    9.0      08 Nov 2019 06:24    TPro  6.8  /  Alb  2.9<L>  /  TBili  0.5  /  DBili  x   /  AST  9<L>  /  ALT  <5<L>  /  AlkPhos  83  11-08    proBNP:   Lipid Profile:   HgA1c:   TSH:

## 2019-11-09 LAB
ALBUMIN SERPL ELPH-MCNC: 3.1 G/DL — LOW (ref 3.3–5)
ALP SERPL-CCNC: 93 U/L — SIGNIFICANT CHANGE UP (ref 40–120)
ALT FLD-CCNC: 5 U/L — LOW (ref 10–45)
ANION GAP SERPL CALC-SCNC: 12 MMOL/L — SIGNIFICANT CHANGE UP (ref 5–17)
AST SERPL-CCNC: 11 U/L — SIGNIFICANT CHANGE UP (ref 10–40)
BILIRUB SERPL-MCNC: 0.4 MG/DL — SIGNIFICANT CHANGE UP (ref 0.2–1.2)
BUN SERPL-MCNC: 63 MG/DL — HIGH (ref 7–23)
CALCIUM SERPL-MCNC: 9 MG/DL — SIGNIFICANT CHANGE UP (ref 8.4–10.5)
CHLORIDE SERPL-SCNC: 89 MMOL/L — LOW (ref 96–108)
CO2 SERPL-SCNC: 32 MMOL/L — HIGH (ref 22–31)
CREAT SERPL-MCNC: 1.98 MG/DL — HIGH (ref 0.5–1.3)
GLUCOSE BLDC GLUCOMTR-MCNC: 160 MG/DL — HIGH (ref 70–99)
GLUCOSE BLDC GLUCOMTR-MCNC: 218 MG/DL — HIGH (ref 70–99)
GLUCOSE BLDC GLUCOMTR-MCNC: 251 MG/DL — HIGH (ref 70–99)
GLUCOSE BLDC GLUCOMTR-MCNC: 268 MG/DL — HIGH (ref 70–99)
GLUCOSE SERPL-MCNC: 309 MG/DL — HIGH (ref 70–99)
HCT VFR BLD CALC: 48.2 % — SIGNIFICANT CHANGE UP (ref 39–50)
HGB BLD-MCNC: 14.7 G/DL — SIGNIFICANT CHANGE UP (ref 13–17)
MCHC RBC-ENTMCNC: 25.3 PG — LOW (ref 27–34)
MCHC RBC-ENTMCNC: 30.5 GM/DL — LOW (ref 32–36)
MCV RBC AUTO: 83 FL — SIGNIFICANT CHANGE UP (ref 80–100)
PLATELET # BLD AUTO: 273 K/UL — SIGNIFICANT CHANGE UP (ref 150–400)
POTASSIUM SERPL-MCNC: 3.5 MMOL/L — SIGNIFICANT CHANGE UP (ref 3.5–5.3)
POTASSIUM SERPL-SCNC: 3.5 MMOL/L — SIGNIFICANT CHANGE UP (ref 3.5–5.3)
PROT SERPL-MCNC: 7 G/DL — SIGNIFICANT CHANGE UP (ref 6–8.3)
RBC # BLD: 5.81 M/UL — HIGH (ref 4.2–5.8)
RBC # FLD: 15.8 % — HIGH (ref 10.3–14.5)
SODIUM SERPL-SCNC: 133 MMOL/L — LOW (ref 135–145)
WBC # BLD: 9.92 K/UL — SIGNIFICANT CHANGE UP (ref 3.8–10.5)
WBC # FLD AUTO: 9.92 K/UL — SIGNIFICANT CHANGE UP (ref 3.8–10.5)

## 2019-11-09 PROCEDURE — 99232 SBSQ HOSP IP/OBS MODERATE 35: CPT

## 2019-11-09 RX ORDER — INSULIN GLARGINE 100 [IU]/ML
60 INJECTION, SOLUTION SUBCUTANEOUS AT BEDTIME
Refills: 0 | Status: DISCONTINUED | OUTPATIENT
Start: 2019-11-09 | End: 2019-11-10

## 2019-11-09 RX ORDER — INSULIN LISPRO 100/ML
16 VIAL (ML) SUBCUTANEOUS
Refills: 0 | Status: DISCONTINUED | OUTPATIENT
Start: 2019-11-09 | End: 2019-11-10

## 2019-11-09 RX ORDER — INSULIN LISPRO 100/ML
20 VIAL (ML) SUBCUTANEOUS
Refills: 0 | Status: DISCONTINUED | OUTPATIENT
Start: 2019-11-09 | End: 2019-11-10

## 2019-11-09 RX ADMIN — Medication 2: at 17:14

## 2019-11-09 RX ADMIN — Medication 100 MILLIGRAM(S): at 06:47

## 2019-11-09 RX ADMIN — OXYCODONE HYDROCHLORIDE 5 MILLIGRAM(S): 5 TABLET ORAL at 23:29

## 2019-11-09 RX ADMIN — Medication 2: at 23:15

## 2019-11-09 RX ADMIN — OXYCODONE HYDROCHLORIDE 5 MILLIGRAM(S): 5 TABLET ORAL at 09:35

## 2019-11-09 RX ADMIN — Medication 10 UNIT(S): at 11:58

## 2019-11-09 RX ADMIN — BUMETANIDE 2 MILLIGRAM(S): 0.25 INJECTION INTRAMUSCULAR; INTRAVENOUS at 06:47

## 2019-11-09 RX ADMIN — HEPARIN SODIUM 5000 UNIT(S): 5000 INJECTION INTRAVENOUS; SUBCUTANEOUS at 06:48

## 2019-11-09 RX ADMIN — Medication 18 UNIT(S): at 08:28

## 2019-11-09 RX ADMIN — Medication 16 UNIT(S): at 17:13

## 2019-11-09 RX ADMIN — OXYCODONE HYDROCHLORIDE 5 MILLIGRAM(S): 5 TABLET ORAL at 22:59

## 2019-11-09 RX ADMIN — OXYCODONE HYDROCHLORIDE 5 MILLIGRAM(S): 5 TABLET ORAL at 08:39

## 2019-11-09 RX ADMIN — INSULIN GLARGINE 60 UNIT(S): 100 INJECTION, SOLUTION SUBCUTANEOUS at 23:14

## 2019-11-09 RX ADMIN — Medication 81 MILLIGRAM(S): at 11:18

## 2019-11-09 RX ADMIN — Medication 100 MILLIGRAM(S): at 17:42

## 2019-11-09 RX ADMIN — Medication 4: at 11:58

## 2019-11-09 RX ADMIN — HEPARIN SODIUM 5000 UNIT(S): 5000 INJECTION INTRAVENOUS; SUBCUTANEOUS at 17:42

## 2019-11-09 RX ADMIN — Medication 6: at 08:28

## 2019-11-09 NOTE — PROGRESS NOTE ADULT - SUBJECTIVE AND OBJECTIVE BOX
Chief Complaint: Evaluating this 63 y/o M for uncontrolled Type 2 DM w/ hyperglycemia      Interval History: Feeling better today. + po intake. No nausea or vomiting. Glucose values remain above goal.     MEDICATIONS  (STANDING):  aspirin enteric coated 81 milliGRAM(s) Oral daily  buMETAnide 2 milliGRAM(s) Oral daily  dextrose 5%. 1000 milliLiter(s) (50 mL/Hr) IV Continuous <Continuous>  dextrose 50% Injectable 12.5 Gram(s) IV Push once  dextrose 50% Injectable 25 Gram(s) IV Push once  dextrose 50% Injectable 25 Gram(s) IV Push once  heparin  Injectable 5000 Unit(s) SubCutaneous every 12 hours  influenza   Vaccine 0.5 milliLiter(s) IntraMuscular once  insulin glargine Injectable (LANTUS) 60 Unit(s) SubCutaneous at bedtime  insulin lispro (HumaLOG) corrective regimen sliding scale   SubCutaneous at bedtime  insulin lispro (HumaLOG) corrective regimen sliding scale   SubCutaneous three times a day before meals  insulin lispro Injectable (HumaLOG) 20 Unit(s) SubCutaneous before breakfast  insulin lispro Injectable (HumaLOG) 16 Unit(s) SubCutaneous before dinner  insulin lispro Injectable (HumaLOG) 10 Unit(s) SubCutaneous before lunch  metolazone 5 milliGRAM(s) Oral daily  metoprolol succinate  milliGRAM(s) Oral daily  pregabalin 100 milliGRAM(s) Oral two times a day    MEDICATIONS  (PRN):  acetaminophen   Tablet .. 650 milliGRAM(s) Oral every 4 hours PRN Moderate Pain (4 - 6)  dextrose 40% Gel 15 Gram(s) Oral once PRN Blood Glucose LESS THAN 70 milliGRAM(s)/deciliter  glucagon  Injectable 1 milliGRAM(s) IntraMuscular once PRN Glucose LESS THAN 70 milligrams/deciliter  oxyCODONE    IR 5 milliGRAM(s) Oral two times a day PRN Severe Pain (7 - 10)      Allergies    No Known Allergies    Intolerances      Review of Systems:  Constitutional: No fever  Eyes: No blurry vision  Cardiovascular: No chest pain  Respiratory: No SOB  GI: No abdominal pain, No nausea, No vomiting  Endocrine: as noted in HPI    All other negative      PHYSICAL EXAM:  VITALS: T(C): 36.6 (11-09-19 @ 12:35)  T(F): 97.9 (11-09-19 @ 12:35), Max: 98.5 (11-09-19 @ 00:58)  HR: 81 (11-09-19 @ 12:35) (81 - 104)  BP: 138/81 (11-09-19 @ 12:35) (107/61 - 150/77)  RR:  (17 - 19)  SpO2:  (92% - 97%)  Wt(kg): --  GENERAL: NAD at this time  EYES: EOMI, No proptosis  HEENT:  Atraumatic, Normocephalic,   RESPIRATORY: Clear to auscultation bilaterally, full excursion, non labored  CARDIOVASCULAR: Regular rhythm; normal S1/S2, no peripheral edema  GI: Soft, nontender, non distended, normal bowel sounds  SKIN: Warm and dry  PSYCH: normal affect, normal mood      POCT Blood Glucose.: 218 mg/dL (11-09-19 @ 11:41)  POCT Blood Glucose.: 251 mg/dL (11-09-19 @ 07:51)  POCT Blood Glucose.: 277 mg/dL (11-08-19 @ 21:05)  POCT Blood Glucose.: 184 mg/dL (11-08-19 @ 17:30)  POCT Blood Glucose.: 233 mg/dL (11-08-19 @ 12:06)  POCT Blood Glucose.: 201 mg/dL (11-08-19 @ 07:28)  POCT Blood Glucose.: 238 mg/dL (11-07-19 @ 21:06)  POCT Blood Glucose.: 138 mg/dL (11-07-19 @ 16:50)  POCT Blood Glucose.: 288 mg/dL (11-07-19 @ 11:26)  POCT Blood Glucose.: 353 mg/dL (11-07-19 @ 07:46)  POCT Blood Glucose.: 331 mg/dL (11-06-19 @ 21:09)  POCT Blood Glucose.: 272 mg/dL (11-06-19 @ 16:35)        11-09    133<L>  |  89<L>  |  63<H>  ----------------------------<  309<H>  3.5   |  32<H>  |  1.98<H>    EGFR if : 40<L>  EGFR if non : 35<L>    Ca    9.0      11-09    TPro  7.0  /  Alb  3.1<L>  /  TBili  0.4  /  DBili  x   /  AST  11  /  ALT  5<L>  /  AlkPhos  93  11-09        Thyroid Function Tests:      Hemoglobin A1C, Whole Blood: 11.7 % <H> [4.0 - 5.6] (11-07-19 @ 09:14)  Hemoglobin A1C, Whole Blood: 10.3 % <H> [4.0 - 5.6] (10-17-19 @ 07:43)

## 2019-11-09 NOTE — PROGRESS NOTE ADULT - ASSESSMENT
65 y/o M with PMH of CAD s/p stents, HFrEF (EF 40% 12/2018), DMT2, HTN, CKD, NSTEMI, Afib (on Pradaxa). Presents to ED with worsening SOB and productive cough with .   pt  recently hospitalized last month and found to have small R hydropneumothorax of unclear etiology. Plans previous admission for drainage of effusion and sampling of fluid and analysis, but patient refused at that time and agreed for outpt f/u. presents now w/ sob       Problem: Hydropneumothorax.   CT from previous admission with R sided hydropneumothorax of unclear etiology.  -Pt refused drainage at that time.  s/p thorocentesis  holding  pradaxa/resume if no further procedures  will speak w/ pulm f/u fluid results        ·  Problem: CHF (congestive heart failure).  Recommendation: -Keep O>I as tolerated.   c/w current meds  cards eval noted    dm /uncontrolled  endo eval noted  fsg riss  c/w insulin regimen as per endo        dm neuropathy  c/w lyrica  oxycodone for severe pain     alex/ ckd  renal f/u   c/w recs as per renal

## 2019-11-09 NOTE — PROGRESS NOTE ADULT - PROBLEM SELECTOR PLAN 1
CT chest with increase in R pl effusion with compressive atelectasis   -Previously noted hydropneumothorax on CT from 10/16, not present on current CT  -S/p R thoracentesis 11/8, 490cc bloody fluid drained. Effusion exudative by Light's Criteria. F/u cyto.  -CT chest with decrease in R effusion   -Pending thoracic consult, may need decortication   -Continue to hold AC for now  -Keep sats >92% with supplemental O2 as needed -Previously noted hydropneumothorax on CT from 10/16, not present on current CT  S/p R thoracentesis 11/8, 490cc bloody fluid drained. Effusion exudative by Light's Criteria. F/u cyto.  -CT chest with decrease in R effusion   -Plan for R pigtail placement in IR Monday  -Continue to hold Pradaxa  -Keep sats >92% with supplemental O2 as needed

## 2019-11-09 NOTE — PROGRESS NOTE ADULT - ASSESSMENT
65 yo M with history of uncontrolled DM2 c/b neuropathy, CKD3, macrovascular disease (CAD, TIA, left carotid artery stenosis), Hypertension, Hyperlipidemia, Atrial Fibrillation on Pradaxa, CHF presenting with SOB and cough secondary to right hydropneumothorax. Endocrine consult requested for management of hyperglycemia in patient with DM2 A1c >10% (high risk patient with high level decision-making). Overnight patient BG improved, 138- 288.

## 2019-11-09 NOTE — PROGRESS NOTE ADULT - SUBJECTIVE AND OBJECTIVE BOX
Follow-up Pulm Progress Note    S/p R thoracentesis yesterday afternoon  Denies SOB/CP.   Sats 96% 2LNC    Medications:  MEDICATIONS  (STANDING):  aspirin enteric coated 81 milliGRAM(s) Oral daily  buMETAnide 2 milliGRAM(s) Oral daily  dextrose 5%. 1000 milliLiter(s) (50 mL/Hr) IV Continuous <Continuous>  dextrose 50% Injectable 12.5 Gram(s) IV Push once  dextrose 50% Injectable 25 Gram(s) IV Push once  dextrose 50% Injectable 25 Gram(s) IV Push once  heparin  Injectable 5000 Unit(s) SubCutaneous every 12 hours  influenza   Vaccine 0.5 milliLiter(s) IntraMuscular once  insulin glargine Injectable (LANTUS) 48 Unit(s) SubCutaneous at bedtime  insulin lispro (HumaLOG) corrective regimen sliding scale   SubCutaneous at bedtime  insulin lispro (HumaLOG) corrective regimen sliding scale   SubCutaneous three times a day before meals  insulin lispro Injectable (HumaLOG) 18 Unit(s) SubCutaneous before breakfast  insulin lispro Injectable (HumaLOG) 12 Unit(s) SubCutaneous before dinner  insulin lispro Injectable (HumaLOG) 10 Unit(s) SubCutaneous before lunch  metolazone 5 milliGRAM(s) Oral daily  metoprolol succinate  milliGRAM(s) Oral daily  pregabalin 100 milliGRAM(s) Oral two times a day    MEDICATIONS  (PRN):  acetaminophen   Tablet .. 650 milliGRAM(s) Oral every 4 hours PRN Moderate Pain (4 - 6)  dextrose 40% Gel 15 Gram(s) Oral once PRN Blood Glucose LESS THAN 70 milliGRAM(s)/deciliter  glucagon  Injectable 1 milliGRAM(s) IntraMuscular once PRN Glucose LESS THAN 70 milligrams/deciliter  oxyCODONE    IR 5 milliGRAM(s) Oral two times a day PRN Severe Pain (7 - 10)          Vital Signs Last 24 Hrs  T(C): 36.8 (09 Nov 2019 08:32), Max: 36.9 (08 Nov 2019 17:35)  T(F): 98.3 (09 Nov 2019 08:32), Max: 98.5 (09 Nov 2019 00:58)  HR: 97 (09 Nov 2019 04:35) (84 - 104)  BP: 107/61 (09 Nov 2019 08:32) (107/61 - 151/84)  BP(mean): --  RR: 18 (09 Nov 2019 08:32) (17 - 19)  SpO2: 95% (09 Nov 2019 08:32) (93% - 97%)      VBG pH 7.38 11-08 @ 06:12    VBG pCO2 63 11-08 @ 06:12    VBG O2 sat 87 11-08 @ 06:12    VBG lactate -- 11-08 @ 06:12      11-08 @ 07:01  -  11-09 @ 07:00  --------------------------------------------------------  IN: 600 mL / OUT: 900 mL / NET: -300 mL          LABS:                        14.7   9.92  )-----------( 273      ( 09 Nov 2019 10:58 )             48.2     11-09    133<L>  |  89<L>  |  63<H>  ----------------------------<  309<H>  3.5   |  32<H>  |  1.98<H>    Ca    9.0      09 Nov 2019 06:39    TPro  7.0  /  Alb  3.1<L>  /  TBili  0.4  /  DBili  x   /  AST  11  /  ALT  5<L>  /  AlkPhos  93  11-09          CAPILLARY BLOOD GLUCOSE      POCT Blood Glucose.: 251 mg/dL (09 Nov 2019 07:51)                    Fluid characteristics  PLEURAL 11-08 @ 18:05  pH 7.74    tprot 3.9    Cell count  Appearance Sl Bloody  Fluid type --  BF lymph 77  color Red  eosinophil 2  PMN 11  Mesothelial 2  Monocyte 8  Other body cells --      CULTURES: (if applicable)              Culture - Body Fluid with Gram Stain (collected 11-08-19 @ 22:02)  Source: .Body Fluid Pleural Fluid  Gram Stain (11-08-19 @ 23:14):    polymorphonuclear leukocytes seen    No organisms seen    by cytocentrifuge              Physical Examination:  PULM: diminished BS R base  CVS: irreg irreg    RADIOLOGY REVIEWED  CT chest:  FINDINGS:    LUNGS AND AIRWAYS: The right lower lobe passive atelectasis of the right   lobe adjacent to the pleural effusion has decreased. Atelectasis adjacent   to the effusion in the right middle lobe is unchanged. Left lower lobe   linear atelectasis is unchanged. Bilateral calcified pulmonary nodules.    PLEURA: Moderate right-sided now loculated pleural effusion, intervally   decreased in size since 11/5/2019.    MEDIASTINUM AND STEPHON: No enlarged chest lymph nodes. The visualized   thyroid gland is unremarkable. The esophagus is unremarkable.    VESSELS: The aorta is normal in caliber. Atheromatousdisease of the   aorta. The pulmonary artery is enlarged which can suggest pulmonary   arterial hypertension.    HEART: Heart size is normal. No pericardial effusion. Coronary artery   calcifications. Aortic valve calcifications.    CHEST WALL AND LOWER NECK: Within normal limits.    VISUALIZED UPPER ABDOMEN: Unremarkable.    BONES: Degenerative changes of the spine.    IMPRESSION:   1.  Intervally decreased right-sided moderate-sized pleural effusion   status post thoracentesis.  2.  No pneumothorax.

## 2019-11-09 NOTE — PROGRESS NOTE ADULT - PROBLEM SELECTOR PLAN 1
uncontrolled DM2 c/b neuropathy, CKD3, macrovascular disease (CAD and CVA)  A1c 11.7%, goal A1c ~7  FS goal 100-180, above goal however improved   FS premeal and qhs  carb consistent diet  Will increase Lantus to 60 units qhs   Will increase to Humalog 20/10/16 units premeal (hold if NPO)   Recommend mod premeal and qhs sliding scale   Patient to follow up with Endocrinologist, Dr Real, on discharge  Continue Basal/bolus, dose to be determined  Patient to follow up with  ophthalmology as outpt

## 2019-11-09 NOTE — PROGRESS NOTE ADULT - ASSESSMENT
64 year old male with CKD Hypertension, Hyperlipidemia, Atrial Fibrillation, s/p DCCV, Coronary Artery Disease, NSTEMI, (2/2014), s/p PCI to mid LAD, Proximal LCx, Distal LCx (2/14, [facility], LULU), Congestive Heart Failure,  Diabetes Mellitus, CVA, Severe Left Internal Carotid Disease, s/p CEA (2/2014) recently admitted for hydropneumothorax, a drainage of effusion and sampling of fluid and analysis was suggested however patient refused at that time.  Pt. presents  today with worsening SOB and cough with worsening pl. effusion         1- ckd III   2- proteinuria   3- chf   4- HTN   5- pleural effusion  s/p thoracentesis  6- a fib    creatinine steady range  will need arb in near future for proteinuria   bumex 2 mg po qd   metolazone 5 mg qd  given cr steady and lytes in range   cont toprol xl 100 mg qd

## 2019-11-09 NOTE — PROGRESS NOTE ADULT - ASSESSMENT
64 year old male with Hypertension, Hyperlipidemia, Atrial Fibrillation, s/p DCCV, Coronary Artery Disease, NSTEMI, (2/2014), s/p PCI to mid LAD, Proximal LCx, Distal LCx (2/14, [facility], LULU), Congestive Heart Failure, Normal Ejection Fraction, Diabetes Mellitus, CVA, Severe Left Internal Carotid Disease, s/p CEA (2/2014) presenting with worsening SOB, cough.     1.Right-sided Pleural effusion, Hydropneumothorax  -s/p right thoracentesis 11/8/0219, bloody exudate  -f/u studies, cytology  -pulmo/thoracic f/u for further management    2. Chronic Congestive heart failure, Diastolic.   -stable, minimal LE edema, no evidence of ADHF on exam   -continue current diuresis regimen, bumex/metol daily   -recent echo with no evidence of pericardial effusion, mod LAE, grossly borderline LV sys dysfx EF 45% likely underestimated due to AF     3. Atrial Fibrillation, chronic   -stable, c/w metoprolol succinate ER 100mg daily  -pradaxa on hold for now until further management plans delineated for effusion  -start IV heparin bob AM     4. Coronary Artery Disease. S/P PCI to LAD, LCx.  -stable, Continue ASA 81mg and statin    dvt ppx       d/w pt and dtr at bedside

## 2019-11-09 NOTE — PROGRESS NOTE ADULT - ASSESSMENT
65 y/o M with PMH of CAD s/p stents, HFrEF (EF 40% 12/2018), DMT2, HTN, CKD, NSTEMI, Afib (on Pradaxa). Presents to ED with worsening SOB and productive cough with clear/whitish sputum for the past week. He was recently hospitalized last month and found to have small R hydropneumothorax of unclear etiology. Plans previous admission for drainage of effusion and sampling of fluid and analysis, but patient refused at that time, as he watned to go to his son's wedding. He did not present for outpt visit. CT chest with increase in R pl effusion. S/p R thoracentesis 11/8 with exudative effusion by Light's Criteria.

## 2019-11-09 NOTE — PROGRESS NOTE ADULT - SUBJECTIVE AND OBJECTIVE BOX
CARDIOLOGY FOLLOW UP NOTE - DR. SAGASTUME    Subjective:    no chest pain, sob, palpitations  + cough  s/p thoracentesis     PHYSICAL EXAM:  T(C): 36.6 (11-09-19 @ 12:35), Max: 36.9 (11-08-19 @ 17:35)  HR: 81 (11-09-19 @ 12:35) (81 - 104)  BP: 138/81 (11-09-19 @ 12:35) (107/61 - 150/77)  RR: 18 (11-09-19 @ 12:35) (17 - 19)  SpO2: 92% (11-09-19 @ 12:35) (92% - 97%)  Wt(kg): --  I&O's Summary    08 Nov 2019 07:01  -  09 Nov 2019 07:00  --------------------------------------------------------  IN: 600 mL / OUT: 900 mL / NET: -300 mL    09 Nov 2019 07:01  -  09 Nov 2019 13:17  --------------------------------------------------------  IN: 240 mL / OUT: 0 mL / NET: 240 mL      Daily     Daily     Appearance: Normal	  Cardiovascular: Normal S1 S2,RRR, No JVD, No murmurs  Respiratory: Lungs clear to auscultation	  Gastrointestinal:  Soft, Non-tender, + BS	  Extremities: Normal range of motion, edema 1+, chronic skin changes      Home Medications:  aspirin 81 mg oral delayed release tablet: 1 tab(s) orally once a day (05 Nov 2019 15:27)  Basaglar KwikPen 100 units/mL subcutaneous solution: 25 unit(s) subcutaneous once a day (at bedtime) (05 Nov 2019 15:27)  bumetanide 2 mg oral tablet: 1 tab(s) orally once a day (05 Nov 2019 15:27)  dabigatran 150 mg oral capsule: 1 cap(s) orally 2 times a day (05 Nov 2019 15:27)  Lyrica 100 mg oral capsule: 1 cap(s) orally 2 times a day (05 Nov 2019 15:27)  metOLazone 5 mg oral tablet: 1 tab(s) orally Monday, Wednesday, and Friday (05 Nov 2019 15:27)  metoprolol succinate 50 mg oral tablet, extended release: 2 tab(s) orally once a day (100 mg) (05 Nov 2019 15:27)  NovoLOG FlexPen 100 units/mL injectable solution: unit(s) injectable 3 times a day (before meals) - Sliding Scale (05 Nov 2019 15:27)  Tylenol 325 mg oral tablet: 2 tab(s) orally every 4 hours, As Needed (05 Nov 2019 15:27)      MEDICATIONS  (STANDING):  aspirin enteric coated 81 milliGRAM(s) Oral daily  buMETAnide 2 milliGRAM(s) Oral daily  dextrose 5%. 1000 milliLiter(s) (50 mL/Hr) IV Continuous <Continuous>  dextrose 50% Injectable 12.5 Gram(s) IV Push once  dextrose 50% Injectable 25 Gram(s) IV Push once  dextrose 50% Injectable 25 Gram(s) IV Push once  heparin  Injectable 5000 Unit(s) SubCutaneous every 12 hours  influenza   Vaccine 0.5 milliLiter(s) IntraMuscular once  insulin glargine Injectable (LANTUS) 60 Unit(s) SubCutaneous at bedtime  insulin lispro (HumaLOG) corrective regimen sliding scale   SubCutaneous at bedtime  insulin lispro (HumaLOG) corrective regimen sliding scale   SubCutaneous three times a day before meals  insulin lispro Injectable (HumaLOG) 20 Unit(s) SubCutaneous before breakfast  insulin lispro Injectable (HumaLOG) 16 Unit(s) SubCutaneous before dinner  insulin lispro Injectable (HumaLOG) 10 Unit(s) SubCutaneous before lunch  metolazone 5 milliGRAM(s) Oral daily  metoprolol succinate  milliGRAM(s) Oral daily  pregabalin 100 milliGRAM(s) Oral two times a day      TELEMETRY: 	    ECG:  	  RADIOLOGY:   DIAGNOSTIC TESTING:  [ ] Echocardiogram:  [ ] Catheterization:  [ ] Stress Test:    OTHER: 	    LABS:	 	    CARDIAC MARKERS:  Troponin T, High Sensitivity Result: 95 ng/L (11-05 @ 15:07)  Troponin T, High Sensitivity Result: 88 ng/L (11-05 @ 13:30)                                14.7   9.92  )-----------( 273      ( 09 Nov 2019 10:58 )             48.2     11-09    133<L>  |  89<L>  |  63<H>  ----------------------------<  309<H>  3.5   |  32<H>  |  1.98<H>    Ca    9.0      09 Nov 2019 06:39    TPro  7.0  /  Alb  3.1<L>  /  TBili  0.4  /  DBili  x   /  AST  11  /  ALT  5<L>  /  AlkPhos  93  11-09    proBNP:     Lipid Profile:   HgA1c:     Creatinine, Serum: 1.98 mg/dL (11-09-19 @ 06:39)  Creatinine, Serum: 2.01 mg/dL (11-08-19 @ 06:24)  Creatinine, Serum: 2.02 mg/dL (11-07-19 @ 06:23)

## 2019-11-09 NOTE — PROGRESS NOTE ADULT - SUBJECTIVE AND OBJECTIVE BOX
CHIEF COMPLAINT:Patient is a 64y old  Male who presents with a chief complaint of SOB and Cough due to right pleural effusion (08 Nov 2019 12:07)    	        PAST MEDICAL & SURGICAL HISTORY:  Neuropathy  CAD (coronary artery disease)  MI (myocardial infarction): circa 2014  Atrial fibrillation  TIA (transient ischemic attack)  DM type 2 (diabetes mellitus, type 2)  HLD (hyperlipidemia)  HTN (hypertension), benign  S/P carotid endarterectomy: left  Status post angioplasty with stent: LULU x 3 2/7/2014          REVIEW OF SYSTEMS:  feels better  EYES: No eye pain, visual disturbances, or discharge  NECK: No pain or stiffness  RESPIRATORY: dec sob  CARDIOVASCULAR: No chest pain, palpitations, passing out, dizziness, or leg swelling  GASTROINTESTINAL: No abdominal or epigastric pain. No nausea, vomiting, or hematemesis; No diarrhea or constipation. No melena or hematochezia.  GENITOURINARY: No dysuria, frequency, hematuria, or incontinence  NEUROLOGICAL: No headaches, memory loss, loss of strength,   some pain at times lower ext    MUSCULOSKELETAL: No joint pain or swelling; No muscle, back, or extremity pain    Medications:  MEDICATIONS  (STANDING):  aspirin enteric coated 81 milliGRAM(s) Oral daily  buMETAnide 2 milliGRAM(s) Oral daily  dextrose 5%. 1000 milliLiter(s) (50 mL/Hr) IV Continuous <Continuous>  dextrose 50% Injectable 12.5 Gram(s) IV Push once  dextrose 50% Injectable 25 Gram(s) IV Push once  dextrose 50% Injectable 25 Gram(s) IV Push once  heparin  Injectable 5000 Unit(s) SubCutaneous every 12 hours  influenza   Vaccine 0.5 milliLiter(s) IntraMuscular once  insulin glargine Injectable (LANTUS) 48 Unit(s) SubCutaneous at bedtime  insulin lispro (HumaLOG) corrective regimen sliding scale   SubCutaneous at bedtime  insulin lispro (HumaLOG) corrective regimen sliding scale   SubCutaneous three times a day before meals  insulin lispro Injectable (HumaLOG) 18 Unit(s) SubCutaneous before breakfast  insulin lispro Injectable (HumaLOG) 12 Unit(s) SubCutaneous before dinner  insulin lispro Injectable (HumaLOG) 10 Unit(s) SubCutaneous before lunch  metolazone 5 milliGRAM(s) Oral daily  metoprolol succinate  milliGRAM(s) Oral daily  pregabalin 100 milliGRAM(s) Oral two times a day    MEDICATIONS  (PRN):  acetaminophen   Tablet .. 650 milliGRAM(s) Oral every 4 hours PRN Moderate Pain (4 - 6)  dextrose 40% Gel 15 Gram(s) Oral once PRN Blood Glucose LESS THAN 70 milliGRAM(s)/deciliter  glucagon  Injectable 1 milliGRAM(s) IntraMuscular once PRN Glucose LESS THAN 70 milligrams/deciliter  oxyCODONE    IR 5 milliGRAM(s) Oral two times a day PRN Severe Pain (7 - 10)    	    PHYSICAL EXAM:  T(C): 36.8 (11-09-19 @ 08:32), Max: 36.9 (11-08-19 @ 17:35)  HR: 97 (11-09-19 @ 04:35) (84 - 104)  BP: 107/61 (11-09-19 @ 08:32) (107/61 - 151/84)  RR: 18 (11-09-19 @ 08:32) (17 - 19)  SpO2: 95% (11-09-19 @ 08:32) (93% - 97%)  Wt(kg): --  I&O's Summary    08 Nov 2019 07:01  -  09 Nov 2019 07:00  --------------------------------------------------------  IN: 600 mL / OUT: 900 mL / NET: -300 mL        Appearance: Normal	  HEENT:   Normal oral mucosa, PERRL, EOMI	  Lymphatic: No lymphadenopathy  Cardiovascular: Normal S1 S2, No JVD, No murmurs, No edema  Respiratory:dec bs   Psychiatry: A & O x 3, Mood & affect appropriate  Gastrointestinal:  Soft, Non-tender, + BS	  Skin: No rashes, No ecchymoses, No cyanosis	  Neurologic: Non-focal  Extremities: pvd  Vascular: Peripheral pulses palpable 2+ bilaterally    TELEMETRY: 	    ECG:  	  RADIOLOGY:  OTHER: 	  	  LABS:	 	    CARDIAC MARKERS:                                14.5   10.77 )-----------( 250      ( 08 Nov 2019 08:15 )             47.8     11-09    133<L>  |  89<L>  |  63<H>  ----------------------------<  309<H>  3.5   |  32<H>  |  1.98<H>    Ca    9.0      09 Nov 2019 06:39    TPro  7.0  /  Alb  3.1<L>  /  TBili  0.4  /  DBili  x   /  AST  11  /  ALT  5<L>  /  AlkPhos  93  11-09    proBNP:   Lipid Profile:   HgA1c:   TSH:

## 2019-11-09 NOTE — PROGRESS NOTE ADULT - SUBJECTIVE AND OBJECTIVE BOX
[facility]   [phone]  RENAL FOLLOW UP NOTE  --------------------------------------------------------------------------------  Chief Complaint:    24 hour events/subjective:    seen earlier. daughter at bedside     PAST HISTORY  --------------------------------------------------------------------------------  No significant changes to PMH, PSH, FHx, SHx, unless otherwise noted    ALLERGIES & MEDICATIONS  --------------------------------------------------------------------------------  Allergies    No Known Allergies    Intolerances      Standing Inpatient Medications  aspirin enteric coated 81 milliGRAM(s) Oral daily  buMETAnide 2 milliGRAM(s) Oral daily  dextrose 5%. 1000 milliLiter(s) IV Continuous <Continuous>  dextrose 50% Injectable 12.5 Gram(s) IV Push once  dextrose 50% Injectable 25 Gram(s) IV Push once  dextrose 50% Injectable 25 Gram(s) IV Push once  heparin  Injectable 5000 Unit(s) SubCutaneous every 12 hours  influenza   Vaccine 0.5 milliLiter(s) IntraMuscular once  insulin glargine Injectable (LANTUS) 60 Unit(s) SubCutaneous at bedtime  insulin lispro (HumaLOG) corrective regimen sliding scale   SubCutaneous at bedtime  insulin lispro (HumaLOG) corrective regimen sliding scale   SubCutaneous three times a day before meals  insulin lispro Injectable (HumaLOG) 20 Unit(s) SubCutaneous before breakfast  insulin lispro Injectable (HumaLOG) 16 Unit(s) SubCutaneous before dinner  insulin lispro Injectable (HumaLOG) 10 Unit(s) SubCutaneous before lunch  metolazone 5 milliGRAM(s) Oral daily  metoprolol succinate  milliGRAM(s) Oral daily  pregabalin 100 milliGRAM(s) Oral two times a day    PRN Inpatient Medications  acetaminophen   Tablet .. 650 milliGRAM(s) Oral every 4 hours PRN  dextrose 40% Gel 15 Gram(s) Oral once PRN  glucagon  Injectable 1 milliGRAM(s) IntraMuscular once PRN  oxyCODONE    IR 5 milliGRAM(s) Oral two times a day PRN      REVIEW OF SYSTEMS  --------------------------------------------------------------------------------    Gen: denies, fevers/chills,  CVS: denies chest pain/palpitations  Resp: denies worsening SOB/Cough -   GI: Denies N/V/Abd pain  : Denies dysuria/oliguria/hematuria    All other systems were reviewed and are negative, except as noted.    VITALS/PHYSICAL EXAM  --------------------------------------------------------------------------------  T(C): 36.6 (11-09-19 @ 12:35), Max: 36.9 (11-08-19 @ 17:35)  HR: 81 (11-09-19 @ 12:35) (81 - 104)  BP: 138/81 (11-09-19 @ 12:35) (107/61 - 150/77)  RR: 18 (11-09-19 @ 12:35) (17 - 19)  SpO2: 92% (11-09-19 @ 12:35) (92% - 97%)  Wt(kg): --        11-08-19 @ 07:01  -  11-09-19 @ 07:00  --------------------------------------------------------  IN: 600 mL / OUT: 900 mL / NET: -300 mL    11-09-19 @ 07:01  -  11-09-19 @ 17:03  --------------------------------------------------------  IN: 480 mL / OUT: 0 mL / NET: 480 mL      Physical Exam:  	  	  Gen:  + obese appears with mildly tachypneic  on O2    	no jvd   	Pulm: decrease bs  R base upto 3/4  no rales or ronchi or wheezing  	CV: RRR, S1S2; no rub  	Abd: +BS, soft, nontender/nondistended  	: No suprapubic tenderness  	UE: Warm, no cyanosis  no clubbing,  no edema  	LE: Warm, no cyanosis  no clubbing, no edema    	    LABS/STUDIES  --------------------------------------------------------------------------------              14.7   9.92  >-----------<  273      [11-09-19 @ 10:58]              48.2     133  |  89  |  63  ----------------------------<  309      [11-09-19 @ 06:39]  3.5   |  32  |  1.98        Ca     9.0     [11-09-19 @ 06:39]    TPro  7.0  /  Alb  3.1  /  TBili  0.4  /  DBili  x   /  AST  11  /  ALT  5   /  AlkPhos  93  [11-09-19 @ 06:39]          Creatinine Trend:  SCr 1.98 [11-09 @ 06:39]  SCr 2.01 [11-08 @ 06:24]  SCr 2.02 [11-07 @ 06:23]  SCr 2.02 [11-06 @ 06:26]  SCr 1.80 [11-05 @ 15:07]              Urinalysis - [10-17-19 @ 04:40]      Color Light Yellow / Appearance Clear / SG 1.015 / pH 6.5      Gluc 500 mg/dL / Ketone Negative  / Bili Negative / Urobili Negative       Blood Small / Protein 300 mg/dL / Leuk Est Negative / Nitrite Negative      RBC 8 / WBC 3 / Hyaline 0 / Gran  / Sq Epi  / Non Sq Epi 0 / Bacteria Negative    Urine Creatinine 76      [11-06-19 @ 08:10]  Urine Protein 134      [11-06-19 @ 08:10]    HbA1c 11.7      [11-07-19 @ 09:14]  TSH 1.71      [12-28-18 @ 14:58]

## 2019-11-10 LAB
ANION GAP SERPL CALC-SCNC: 9 MMOL/L — SIGNIFICANT CHANGE UP (ref 5–17)
BUN SERPL-MCNC: 63 MG/DL — HIGH (ref 7–23)
CALCIUM SERPL-MCNC: 9.2 MG/DL — SIGNIFICANT CHANGE UP (ref 8.4–10.5)
CHLORIDE SERPL-SCNC: 89 MMOL/L — LOW (ref 96–108)
CO2 SERPL-SCNC: 36 MMOL/L — HIGH (ref 22–31)
CREAT SERPL-MCNC: 1.98 MG/DL — HIGH (ref 0.5–1.3)
GLUCOSE BLDC GLUCOMTR-MCNC: 174 MG/DL — HIGH (ref 70–99)
GLUCOSE BLDC GLUCOMTR-MCNC: 177 MG/DL — HIGH (ref 70–99)
GLUCOSE BLDC GLUCOMTR-MCNC: 247 MG/DL — HIGH (ref 70–99)
GLUCOSE BLDC GLUCOMTR-MCNC: 301 MG/DL — HIGH (ref 70–99)
GLUCOSE SERPL-MCNC: 247 MG/DL — HIGH (ref 70–99)
HCT VFR BLD CALC: 48.4 % — SIGNIFICANT CHANGE UP (ref 39–50)
HGB BLD-MCNC: 14.6 G/DL — SIGNIFICANT CHANGE UP (ref 13–17)
MCHC RBC-ENTMCNC: 24.9 PG — LOW (ref 27–34)
MCHC RBC-ENTMCNC: 30.2 GM/DL — LOW (ref 32–36)
MCV RBC AUTO: 82.6 FL — SIGNIFICANT CHANGE UP (ref 80–100)
PLATELET # BLD AUTO: 253 K/UL — SIGNIFICANT CHANGE UP (ref 150–400)
POTASSIUM SERPL-MCNC: 3.6 MMOL/L — SIGNIFICANT CHANGE UP (ref 3.5–5.3)
POTASSIUM SERPL-SCNC: 3.6 MMOL/L — SIGNIFICANT CHANGE UP (ref 3.5–5.3)
RBC # BLD: 5.86 M/UL — HIGH (ref 4.2–5.8)
RBC # FLD: 15.4 % — HIGH (ref 10.3–14.5)
SODIUM SERPL-SCNC: 134 MMOL/L — LOW (ref 135–145)
WBC # BLD: 9.47 K/UL — SIGNIFICANT CHANGE UP (ref 3.8–10.5)
WBC # FLD AUTO: 9.47 K/UL — SIGNIFICANT CHANGE UP (ref 3.8–10.5)

## 2019-11-10 PROCEDURE — 99232 SBSQ HOSP IP/OBS MODERATE 35: CPT

## 2019-11-10 RX ORDER — ENOXAPARIN SODIUM 100 MG/ML
80 INJECTION SUBCUTANEOUS EVERY 12 HOURS
Refills: 0 | Status: DISCONTINUED | OUTPATIENT
Start: 2019-11-10 | End: 2019-11-10

## 2019-11-10 RX ORDER — ENOXAPARIN SODIUM 100 MG/ML
130 INJECTION SUBCUTANEOUS ONCE
Refills: 0 | Status: COMPLETED | OUTPATIENT
Start: 2019-11-10 | End: 2019-11-10

## 2019-11-10 RX ORDER — INSULIN LISPRO 100/ML
24 VIAL (ML) SUBCUTANEOUS
Refills: 0 | Status: DISCONTINUED | OUTPATIENT
Start: 2019-11-10 | End: 2019-11-11

## 2019-11-10 RX ORDER — INSULIN LISPRO 100/ML
22 VIAL (ML) SUBCUTANEOUS
Refills: 0 | Status: DISCONTINUED | OUTPATIENT
Start: 2019-11-10 | End: 2019-11-17

## 2019-11-10 RX ORDER — ENOXAPARIN SODIUM 100 MG/ML
130 INJECTION SUBCUTANEOUS
Refills: 0 | Status: DISCONTINUED | OUTPATIENT
Start: 2019-11-10 | End: 2019-11-11

## 2019-11-10 RX ORDER — INSULIN GLARGINE 100 [IU]/ML
70 INJECTION, SOLUTION SUBCUTANEOUS AT BEDTIME
Refills: 0 | Status: DISCONTINUED | OUTPATIENT
Start: 2019-11-10 | End: 2019-11-11

## 2019-11-10 RX ADMIN — Medication 81 MILLIGRAM(S): at 11:53

## 2019-11-10 RX ADMIN — ENOXAPARIN SODIUM 130 MILLIGRAM(S): 100 INJECTION SUBCUTANEOUS at 21:27

## 2019-11-10 RX ADMIN — Medication 4: at 08:29

## 2019-11-10 RX ADMIN — Medication 22 UNIT(S): at 17:05

## 2019-11-10 RX ADMIN — Medication 8: at 11:44

## 2019-11-10 RX ADMIN — Medication 2: at 17:06

## 2019-11-10 RX ADMIN — Medication 100 MILLIGRAM(S): at 06:08

## 2019-11-10 RX ADMIN — Medication 10 UNIT(S): at 11:44

## 2019-11-10 RX ADMIN — HEPARIN SODIUM 5000 UNIT(S): 5000 INJECTION INTRAVENOUS; SUBCUTANEOUS at 06:08

## 2019-11-10 RX ADMIN — OXYCODONE HYDROCHLORIDE 5 MILLIGRAM(S): 5 TABLET ORAL at 10:05

## 2019-11-10 RX ADMIN — Medication 20 UNIT(S): at 08:29

## 2019-11-10 RX ADMIN — OXYCODONE HYDROCHLORIDE 5 MILLIGRAM(S): 5 TABLET ORAL at 23:28

## 2019-11-10 RX ADMIN — Medication 100 MILLIGRAM(S): at 06:07

## 2019-11-10 RX ADMIN — OXYCODONE HYDROCHLORIDE 5 MILLIGRAM(S): 5 TABLET ORAL at 09:13

## 2019-11-10 RX ADMIN — ENOXAPARIN SODIUM 130 MILLIGRAM(S): 100 INJECTION SUBCUTANEOUS at 11:50

## 2019-11-10 RX ADMIN — OXYCODONE HYDROCHLORIDE 5 MILLIGRAM(S): 5 TABLET ORAL at 22:58

## 2019-11-10 RX ADMIN — Medication 100 MILLIGRAM(S): at 17:13

## 2019-11-10 RX ADMIN — BUMETANIDE 2 MILLIGRAM(S): 0.25 INJECTION INTRAMUSCULAR; INTRAVENOUS at 06:07

## 2019-11-10 NOTE — PROGRESS NOTE ADULT - SUBJECTIVE AND OBJECTIVE BOX
Chief Complaint: Evaluating this 63 y/o M for uncontrolled Type 2 DM w/ hyperglycemia      Interval History: Still with hyperglycemia. Eating only what's given on the trays. No nausea or vomiting. + baseline SOB.     MEDICATIONS  (STANDING):  aspirin enteric coated 81 milliGRAM(s) Oral daily  buMETAnide 2 milliGRAM(s) Oral daily  dextrose 5%. 1000 milliLiter(s) (50 mL/Hr) IV Continuous <Continuous>  dextrose 50% Injectable 12.5 Gram(s) IV Push once  dextrose 50% Injectable 25 Gram(s) IV Push once  dextrose 50% Injectable 25 Gram(s) IV Push once  enoxaparin Injectable 130 milliGRAM(s) SubCutaneous two times a day  influenza   Vaccine 0.5 milliLiter(s) IntraMuscular once  insulin glargine Injectable (LANTUS) 70 Unit(s) SubCutaneous at bedtime  insulin lispro (HumaLOG) corrective regimen sliding scale   SubCutaneous at bedtime  insulin lispro (HumaLOG) corrective regimen sliding scale   SubCutaneous three times a day before meals  insulin lispro Injectable (HumaLOG) 24 Unit(s) SubCutaneous before breakfast  insulin lispro Injectable (HumaLOG) 22 Unit(s) SubCutaneous before dinner  insulin lispro Injectable (HumaLOG) 10 Unit(s) SubCutaneous before lunch  metoprolol succinate  milliGRAM(s) Oral daily  pregabalin 100 milliGRAM(s) Oral two times a day    MEDICATIONS  (PRN):  acetaminophen   Tablet .. 650 milliGRAM(s) Oral every 4 hours PRN Moderate Pain (4 - 6)  dextrose 40% Gel 15 Gram(s) Oral once PRN Blood Glucose LESS THAN 70 milliGRAM(s)/deciliter  glucagon  Injectable 1 milliGRAM(s) IntraMuscular once PRN Glucose LESS THAN 70 milligrams/deciliter  oxyCODONE    IR 5 milliGRAM(s) Oral two times a day PRN Severe Pain (7 - 10)      Allergies    No Known Allergies    Intolerances      Review of Systems:  Constitutional: No fever  Eyes: No blurry vision  Cardiovascular: No chest pain  Respiratory: +SOB  GI: No abdominal pain, No nausea, No vomiting  Endocrine: as noted in HPI    All other negative      PHYSICAL EXAM:  VITALS: T(C): 36.8 (11-10-19 @ 12:50)  T(F): 98.2 (11-10-19 @ 12:50), Max: 98.4 (11-10-19 @ 00:59)  HR: 97 (11-10-19 @ 12:50) (82 - 97)  BP: 124/76 (11-10-19 @ 12:50) (113/69 - 136/65)  RR:  (18 - 18)  SpO2:  (92% - 98%)  Wt(kg): --  GENERAL: NAD at this time  EYES: EOMI, No proptosis  HEENT:  Atraumatic, Normocephalic,   RESPIRATORY: full excursion, non labored  CARDIOVASCULAR: Regular rhythm; normal S1/S2, +b/l LE peripheral edema  GI: Soft, nontender, non distended, normal bowel sounds  SKIN: Warm and dry  PSYCH: normal affect, normal mood      POCT Blood Glucose.: 301 mg/dL (11-10-19 @ 11:31)  POCT Blood Glucose.: 247 mg/dL (11-10-19 @ 07:58)  POCT Blood Glucose.: 268 mg/dL (11-09-19 @ 23:02)  POCT Blood Glucose.: 160 mg/dL (11-09-19 @ 17:02)  POCT Blood Glucose.: 218 mg/dL (11-09-19 @ 11:41)  POCT Blood Glucose.: 251 mg/dL (11-09-19 @ 07:51)  POCT Blood Glucose.: 277 mg/dL (11-08-19 @ 21:05)  POCT Blood Glucose.: 184 mg/dL (11-08-19 @ 17:30)  POCT Blood Glucose.: 233 mg/dL (11-08-19 @ 12:06)  POCT Blood Glucose.: 201 mg/dL (11-08-19 @ 07:28)  POCT Blood Glucose.: 238 mg/dL (11-07-19 @ 21:06)  POCT Blood Glucose.: 138 mg/dL (11-07-19 @ 16:50)        11-10    134<L>  |  89<L>  |  63<H>  ----------------------------<  247<H>  3.6   |  36<H>  |  1.98<H>    EGFR if : 40<L>  EGFR if non : 35<L>    Ca    9.2      11-10    TPro  7.0  /  Alb  3.1<L>  /  TBili  0.4  /  DBili  x   /  AST  11  /  ALT  5<L>  /  AlkPhos  93  11-09        Thyroid Function Tests:      Hemoglobin A1C, Whole Blood: 11.7 % <H> [4.0 - 5.6] (11-07-19 @ 09:14)  Hemoglobin A1C, Whole Blood: 10.3 % <H> [4.0 - 5.6] (10-17-19 @ 07:43)

## 2019-11-10 NOTE — PROVIDER CONTACT NOTE (OTHER) - BACKGROUND
Pt is admitted with Moderate Right pleural effusion with compressive atelectasis of RLL        	CKD 3, nephrotic syndrome         	Uncontrolled DM 2

## 2019-11-10 NOTE — PROGRESS NOTE ADULT - SUBJECTIVE AND OBJECTIVE BOX
CARDIOLOGY FOLLOW UP NOTE - DR. SAGASTUME    Subjective:    no chest pain, sob, palpitations    PHYSICAL EXAM:  T(C): 36.7 (11-10-19 @ 08:54), Max: 36.9 (11-10-19 @ 00:59)  HR: 82 (11-10-19 @ 08:54) (81 - 88)  BP: 130/77 (11-10-19 @ 08:54) (113/69 - 138/81)  RR: 18 (11-10-19 @ 08:54) (18 - 18)  SpO2: 93% (11-10-19 @ 08:54) (92% - 98%)  Wt(kg): --  I&O's Summary    09 Nov 2019 07:01  -  10 Nov 2019 07:00  --------------------------------------------------------  IN: 480 mL / OUT: 1200 mL / NET: -720 mL    10 Nov 2019 07:01  -  10 Nov 2019 11:45  --------------------------------------------------------  IN: 375 mL / OUT: 425 mL / NET: -50 mL      Daily     Daily     Appearance: Normal	  Cardiovascular: Normal S1 S2,RRR, No JVD, No murmurs  Respiratory: Lungs clear to auscultation	deec bs bases  Gastrointestinal:  Soft, Non-tender, + BS	  Extremities: Normal range of motion,edema, chronic discoloration    Home Medications:  aspirin 81 mg oral delayed release tablet: 1 tab(s) orally once a day (05 Nov 2019 15:27)  Basaglar KwikPen 100 units/mL subcutaneous solution: 25 unit(s) subcutaneous once a day (at bedtime) (05 Nov 2019 15:27)  bumetanide 2 mg oral tablet: 1 tab(s) orally once a day (05 Nov 2019 15:27)  dabigatran 150 mg oral capsule: 1 cap(s) orally 2 times a day (05 Nov 2019 15:27)  Lyrica 100 mg oral capsule: 1 cap(s) orally 2 times a day (05 Nov 2019 15:27)  metOLazone 5 mg oral tablet: 1 tab(s) orally Monday, Wednesday, and Friday (05 Nov 2019 15:27)  metoprolol succinate 50 mg oral tablet, extended release: 2 tab(s) orally once a day (100 mg) (05 Nov 2019 15:27)  NovoLOG FlexPen 100 units/mL injectable solution: unit(s) injectable 3 times a day (before meals) - Sliding Scale (05 Nov 2019 15:27)  Tylenol 325 mg oral tablet: 2 tab(s) orally every 4 hours, As Needed (05 Nov 2019 15:27)      MEDICATIONS  (STANDING):  aspirin enteric coated 81 milliGRAM(s) Oral daily  buMETAnide 2 milliGRAM(s) Oral daily  dextrose 5%. 1000 milliLiter(s) (50 mL/Hr) IV Continuous <Continuous>  dextrose 50% Injectable 12.5 Gram(s) IV Push once  dextrose 50% Injectable 25 Gram(s) IV Push once  dextrose 50% Injectable 25 Gram(s) IV Push once  enoxaparin Injectable 130 milliGRAM(s) SubCutaneous two times a day  enoxaparin Injectable 130 milliGRAM(s) SubCutaneous once  influenza   Vaccine 0.5 milliLiter(s) IntraMuscular once  insulin glargine Injectable (LANTUS) 70 Unit(s) SubCutaneous at bedtime  insulin lispro (HumaLOG) corrective regimen sliding scale   SubCutaneous at bedtime  insulin lispro (HumaLOG) corrective regimen sliding scale   SubCutaneous three times a day before meals  insulin lispro Injectable (HumaLOG) 24 Unit(s) SubCutaneous before breakfast  insulin lispro Injectable (HumaLOG) 22 Unit(s) SubCutaneous before dinner  insulin lispro Injectable (HumaLOG) 10 Unit(s) SubCutaneous before lunch  metoprolol succinate  milliGRAM(s) Oral daily  pregabalin 100 milliGRAM(s) Oral two times a day      TELEMETRY: 	    ECG:  	  RADIOLOGY:   DIAGNOSTIC TESTING:  [ ] Echocardiogram:  [ ] Catheterization:  [ ] Stress Test:    OTHER: 	    LABS:	 	    CARDIAC MARKERS:  Troponin T, High Sensitivity Result: 95 ng/L (11-05 @ 15:07)  Troponin T, High Sensitivity Result: 88 ng/L (11-05 @ 13:30)                                14.6   9.47  )-----------( 253      ( 10 Nov 2019 09:28 )             48.4     11-10    134<L>  |  89<L>  |  63<H>  ----------------------------<  247<H>  3.6   |  36<H>  |  1.98<H>    Ca    9.2      10 Nov 2019 06:10    TPro  7.0  /  Alb  3.1<L>  /  TBili  0.4  /  DBili  x   /  AST  11  /  ALT  5<L>  /  AlkPhos  93  11-09    proBNP:     Lipid Profile:   HgA1c:     Creatinine, Serum: 1.98 mg/dL (11-10-19 @ 06:10)  Creatinine, Serum: 1.98 mg/dL (11-09-19 @ 06:39)  Creatinine, Serum: 2.01 mg/dL (11-08-19 @ 06:24)

## 2019-11-10 NOTE — PROGRESS NOTE ADULT - ASSESSMENT
64 year old male with CKD Hypertension, Hyperlipidemia, Atrial Fibrillation, s/p DCCV, Coronary Artery Disease, NSTEMI, (2/2014), s/p PCI to mid LAD, Proximal LCx, Distal LCx (2/14, [facility], LULU), Congestive Heart Failure,  Diabetes Mellitus, CVA, Severe Left Internal Carotid Disease, s/p CEA (2/2014) recently admitted for hydropneumothorax, a drainage of effusion and sampling of fluid and analysis was suggested however patient refused at that time.  Pt. presents  today with worsening SOB and cough with worsening pl. effusion         1- ckd III   2- proteinuria   3- chf   4- HTN   5- pleural effusion  s/p thoracentesis  6- a fib    creatinine steady range  will need arb in near future for proteinuria   bumex 2 mg po qd   given alkalsosis worsening change metolazone 5 mg qod    cont toprol xl 100 mg qd

## 2019-11-10 NOTE — PROGRESS NOTE ADULT - SUBJECTIVE AND OBJECTIVE BOX
[facility]   [phone]  RENAL FOLLOW UP NOTE  --------------------------------------------------------------------------------  Chief Complaint:    24 hour events/subjective:    no worsening sob when seen earlier     PAST HISTORY  --------------------------------------------------------------------------------  No significant changes to PMH, PSH, FHx, SHx, unless otherwise noted    ALLERGIES & MEDICATIONS  --------------------------------------------------------------------------------  Allergies    No Known Allergies    Intolerances      Standing Inpatient Medications  aspirin enteric coated 81 milliGRAM(s) Oral daily  buMETAnide 2 milliGRAM(s) Oral daily  dextrose 5%. 1000 milliLiter(s) IV Continuous <Continuous>  dextrose 50% Injectable 12.5 Gram(s) IV Push once  dextrose 50% Injectable 25 Gram(s) IV Push once  dextrose 50% Injectable 25 Gram(s) IV Push once  enoxaparin Injectable 130 milliGRAM(s) SubCutaneous two times a day  influenza   Vaccine 0.5 milliLiter(s) IntraMuscular once  insulin glargine Injectable (LANTUS) 70 Unit(s) SubCutaneous at bedtime  insulin lispro (HumaLOG) corrective regimen sliding scale   SubCutaneous at bedtime  insulin lispro (HumaLOG) corrective regimen sliding scale   SubCutaneous three times a day before meals  insulin lispro Injectable (HumaLOG) 24 Unit(s) SubCutaneous before breakfast  insulin lispro Injectable (HumaLOG) 22 Unit(s) SubCutaneous before dinner  insulin lispro Injectable (HumaLOG) 10 Unit(s) SubCutaneous before lunch  metoprolol succinate  milliGRAM(s) Oral daily  pregabalin 100 milliGRAM(s) Oral two times a day    PRN Inpatient Medications  acetaminophen   Tablet .. 650 milliGRAM(s) Oral every 4 hours PRN  dextrose 40% Gel 15 Gram(s) Oral once PRN  glucagon  Injectable 1 milliGRAM(s) IntraMuscular once PRN  oxyCODONE    IR 5 milliGRAM(s) Oral two times a day PRN      REVIEW OF SYSTEMS  --------------------------------------------------------------------------------    Gen: denies fevers/chills,  CVS: denies chest pain/palpitations  Resp: denies worsening SOB/Cough  GI: Denies N/V/Abd pain  : Denies dysuria/oliguria/hematuria    All other systems were reviewed and are negative, except as noted.    VITALS/PHYSICAL EXAM  --------------------------------------------------------------------------------  T(C): 36.8 (11-10-19 @ 12:50), Max: 36.9 (11-10-19 @ 00:59)  HR: 97 (11-10-19 @ 12:50) (82 - 97)  BP: 124/76 (11-10-19 @ 12:50) (113/69 - 136/65)  RR: 18 (11-10-19 @ 12:50) (18 - 18)  SpO2: 98% (11-10-19 @ 12:50) (92% - 98%)  Wt(kg): --        11-09-19 @ 07:01  -  11-10-19 @ 07:00  --------------------------------------------------------  IN: 480 mL / OUT: 1200 mL / NET: -720 mL    11-10-19 @ 07:01  -  11-10-19 @ 15:40  --------------------------------------------------------  IN: 700 mL / OUT: 725 mL / NET: -25 mL      Physical Exam:  	  	  Gen:  + obese appears with mildly tachypneic  on O2    	no jvd   	Pulm: decrease bs  R base upto 3/4  no rales or ronchi or wheezing  	CV: RRR, S1S2; no rub  	Abd: +BS, soft, nontender/nondistended  	: No suprapubic tenderness  	UE: Warm, no cyanosis  no clubbing,  no edema  	LE: Warm, no cyanosis  no clubbing, trace edema    	  LABS/STUDIES  --------------------------------------------------------------------------------              14.6   9.47  >-----------<  253      [11-10-19 @ 09:28]              48.4     134  |  89  |  63  ----------------------------<  247      [11-10-19 @ 06:10]  3.6   |  36  |  1.98        Ca     9.2     [11-10-19 @ 06:10]    TPro  7.0  /  Alb  3.1  /  TBili  0.4  /  DBili  x   /  AST  11  /  ALT  5   /  AlkPhos  93  [11-09-19 @ 06:39]          Creatinine Trend:  SCr 1.98 [11-10 @ 06:10]  SCr 1.98 [11-09 @ 06:39]  SCr 2.01 [11-08 @ 06:24]  SCr 2.02 [11-07 @ 06:23]  SCr 2.02 [11-06 @ 06:26]              Urinalysis - [10-17-19 @ 04:40]      Color Light Yellow / Appearance Clear / SG 1.015 / pH 6.5      Gluc 500 mg/dL / Ketone Negative  / Bili Negative / Urobili Negative       Blood Small / Protein 300 mg/dL / Leuk Est Negative / Nitrite Negative      RBC 8 / WBC 3 / Hyaline 0 / Gran  / Sq Epi  / Non Sq Epi 0 / Bacteria Negative    Urine Creatinine 76      [11-06-19 @ 08:10]  Urine Protein 134      [11-06-19 @ 08:10]    HbA1c 11.7      [11-07-19 @ 09:14]  TSH 1.71      [12-28-18 @ 14:58]

## 2019-11-10 NOTE — PROGRESS NOTE ADULT - ASSESSMENT
64 year old male with Hypertension, Hyperlipidemia, Atrial Fibrillation, s/p DCCV, Coronary Artery Disease, NSTEMI, (2/2014), s/p PCI to mid LAD, Proximal LCx, Distal LCx (2/14, [facility], [facility]), Congestive Heart Failure, Normal Ejection Fraction, Diabetes Mellitus, CVA, Severe Left Internal Carotid Disease, s/p CEA (2/2014) presenting with worsening SOB, cough.     1.Right-sided Pleural effusion, Hydropneumothorax  -s/p right thoracentesis 11/8/0219, bloody exudate  -f/u studies, cytology  -pulmo/thoracic f/u for further management    2. Chronic Congestive heart failure, Diastolic.   -stable, minimal LE edema, no evidence of ADHF on exam   -continue current diuresis regimen, bumex/metol daily   -recent echo with no evidence of pericardial effusion, mod LAE, grossly borderline LV sys dysfx EF 45% likely underestimated due to AF     3. Atrial Fibrillation, chronic   -stable, c/w metoprolol succinate ER 100mg daily  -pradaxa on hold for now until further management plans delineated for effusion  -cont a/c with lovenox for PAF    4. Coronary Artery Disease. S/P PCI to LAD, LCx.  -stable, Continue ASA 81mg and statin    dvt ppx 64 year old male with Hypertension, Hyperlipidemia, Atrial Fibrillation, s/p DCCV, Coronary Artery Disease, NSTEMI, (2/2014), s/p PCI to mid LAD, Proximal LCx, Distal LCx (2/14, [facility], [facility]), Congestive Heart Failure, Normal Ejection Fraction, Diabetes Mellitus, CVA, Severe Left Internal Carotid Disease, s/p CEA (2/2014) presenting with worsening SOB, cough.     1.Right-sided Pleural effusion, Hydropneumothorax  -s/p right thoracentesis 11/8/0219, bloody exudate  -f/u studies, cytology  -pulmo/thoracic f/u for further management    2. Chronic Congestive heart failure, Diastolic.   -stable, minimal LE edema, no evidence of ADHF on exam   -continue current diuresis regimen, bumex/metol daily, decrease metol to Q 48 if bicarb cont to rise    -recent echo with no evidence of pericardial effusion, mod LAE, grossly borderline LV sys dysfx EF 45% likely underestimated due to AF     3. Atrial Fibrillation, chronic   -stable, c/w metoprolol succinate ER 100mg daily  -pradaxa on hold for now until further management plans delineated for effusion  -cont a/c with lovenox for PAF    4. Coronary Artery Disease. S/P PCI to LAD, LCx.  -stable, Continue ASA 81mg and statin    dvt ppx

## 2019-11-10 NOTE — PROGRESS NOTE ADULT - SUBJECTIVE AND OBJECTIVE BOX
CHIEF COMPLAINT:Patient is a 64y old  Male who presents with a chief complaint of CHF (09 Nov 2019 15:35)    	        PAST MEDICAL & SURGICAL HISTORY:  Neuropathy  CAD (coronary artery disease)  MI (myocardial infarction): circa 2014  Atrial fibrillation  TIA (transient ischemic attack)  DM type 2 (diabetes mellitus, type 2)  HLD (hyperlipidemia)  HTN (hypertension), benign  S/P carotid endarterectomy: left  Status post angioplasty with stent: [facility] x 3 2/7/2014          REVIEW OF SYSTEMS:  CONSTITUTIONAL: No fever, weight loss, or fatigue  EYES: No eye pain, visual disturbances, or discharge  NECK: No pain or stiffness  RESPIRATORY:dec sob   CARDIOVASCULAR: No chest pain, palpitations, passing out, dizziness, or leg swelling  GASTROINTESTINAL: No abdominal or epigastric pain. No nausea, vomiting, or hematemesis; No diarrhea or constipation. No melena or hematochezia.  GENITOURINARY: No dysuria, frequency, hematuria, or incontinence  NEUROLOGICAL: No headaches, memory loss, loss of strength, numbness, or tremors      Medications:  MEDICATIONS  (STANDING):  aspirin enteric coated 81 milliGRAM(s) Oral daily  buMETAnide 2 milliGRAM(s) Oral daily  dextrose 5%. 1000 milliLiter(s) (50 mL/Hr) IV Continuous <Continuous>  dextrose 50% Injectable 12.5 Gram(s) IV Push once  dextrose 50% Injectable 25 Gram(s) IV Push once  dextrose 50% Injectable 25 Gram(s) IV Push once  enoxaparin Injectable 130 milliGRAM(s) SubCutaneous two times a day  influenza   Vaccine 0.5 milliLiter(s) IntraMuscular once  insulin glargine Injectable (LANTUS) 60 Unit(s) SubCutaneous at bedtime  insulin lispro (HumaLOG) corrective regimen sliding scale   SubCutaneous at bedtime  insulin lispro (HumaLOG) corrective regimen sliding scale   SubCutaneous three times a day before meals  insulin lispro Injectable (HumaLOG) 20 Unit(s) SubCutaneous before breakfast  insulin lispro Injectable (HumaLOG) 16 Unit(s) SubCutaneous before dinner  insulin lispro Injectable (HumaLOG) 10 Unit(s) SubCutaneous before lunch  metoprolol succinate  milliGRAM(s) Oral daily  pregabalin 100 milliGRAM(s) Oral two times a day    MEDICATIONS  (PRN):  acetaminophen   Tablet .. 650 milliGRAM(s) Oral every 4 hours PRN Moderate Pain (4 - 6)  dextrose 40% Gel 15 Gram(s) Oral once PRN Blood Glucose LESS THAN 70 milliGRAM(s)/deciliter  glucagon  Injectable 1 milliGRAM(s) IntraMuscular once PRN Glucose LESS THAN 70 milligrams/deciliter  oxyCODONE    IR 5 milliGRAM(s) Oral two times a day PRN Severe Pain (7 - 10)    	    PHYSICAL EXAM:  T(C): 36.7 (11-10-19 @ 08:54), Max: 36.9 (11-10-19 @ 00:59)  HR: 82 (11-10-19 @ 08:54) (81 - 88)  BP: 130/77 (11-10-19 @ 08:54) (113/69 - 138/81)  RR: 18 (11-10-19 @ 08:54) (18 - 18)  SpO2: 93% (11-10-19 @ 08:54) (92% - 98%)  Wt(kg): --  I&O's Summary    09 Nov 2019 07:01  -  10 Nov 2019 07:00  --------------------------------------------------------  IN: 480 mL / OUT: 1200 mL / NET: -720 mL    10 Nov 2019 07:01  -  10 Nov 2019 11:04  --------------------------------------------------------  IN: 375 mL / OUT: 425 mL / NET: -50 mL        Appearance: Normal	  HEENT:   Normal oral mucosa, PERRL, EOMI	  Lymphatic: No lymphadenopathy  Cardiovascular: Normal S1 S2, No JVD,   Respiratory:dec bs   Psychiatry: A & O x 3,  Gastrointestinal:  Soft, Non-tender, + BS	  Skin: No rashes, No ecchymoses, No cyanosis	  Neurologic: Non-focal  Extremities: pvvd  Vascular: Peripheral pulses palpable    TELEMETRY: 	    ECG:  	  RADIOLOGY:  OTHER: 	  	  LABS:	 	    CARDIAC MARKERS:                                14.6   9.47  )-----------( 253      ( 10 Nov 2019 09:28 )             48.4     11-10    134<L>  |  89<L>  |  63<H>  ----------------------------<  247<H>  3.6   |  36<H>  |  1.98<H>    Ca    9.2      10 Nov 2019 06:10    TPro  7.0  /  Alb  3.1<L>  /  TBili  0.4  /  DBili  x   /  AST  11  /  ALT  5<L>  /  AlkPhos  93  11-09    proBNP:   Lipid Profile:   HgA1c:   TSH:

## 2019-11-10 NOTE — PROGRESS NOTE ADULT - ASSESSMENT
63 y/o M with PMH of CAD s/p stents, HFrEF (EF 40% 12/2018), DMT2, HTN, CKD, NSTEMI, Afib (on Pradaxa). Presents to ED with worsening SOB and productive cough with .   pt  recently hospitalized last month and found to have small R hydropneumothorax of unclear etiology. Plans previous admission for drainage of effusion and sampling of fluid and analysis, but patient refused at that time and agreed for outpt f/u. presents now w/ sob       Problem: Hydropneumothorax.   CT from previous admission with R sided hydropneumothorax of unclear etiology.  -Pt refused drainage at that time.  s/p thorocentesis/c/w exudte  holding  pradaxa/rwill start lovenox till procedure   pig tail tomorrow        ·  Problem: CHF (congestive heart failure).  Recommendation: -Keep O>I as tolerated.   c/w current meds  cards eval noted    dm /uncontrolled  endo eval noted  fsg riss  c/w insulin regimen as per endo        dm neuropathy  c/w lyrica  oxycodone for severe pain     alex/ ckd  renal f/u   c/w recs as per renal

## 2019-11-10 NOTE — PROGRESS NOTE ADULT - PROBLEM SELECTOR PLAN 1
uncontrolled DM2 c/b neuropathy, CKD3, macrovascular disease (CAD and CVA)  A1c 11.7%, goal A1c ~7  FS goal 100-180, above goal however improved   FS premeal and qhs  carb consistent diet  Will increase Lantus to 70 units qhs   Will increase to Humalog 24/10/20 units premeal (hold if NPO)   Recommend mod premeal and qhs sliding scale   Patient to follow up with Endocrinologist, Dr Real, on discharge  Continue Basal/bolus, dose to be determined  Patient to follow up with  ophthalmology as outpt

## 2019-11-11 LAB
ALBUMIN SERPL ELPH-MCNC: 3.2 G/DL — LOW (ref 3.3–5)
ALP SERPL-CCNC: 89 U/L — SIGNIFICANT CHANGE UP (ref 40–120)
ALT FLD-CCNC: 6 U/L — LOW (ref 10–45)
ANION GAP SERPL CALC-SCNC: 14 MMOL/L — SIGNIFICANT CHANGE UP (ref 5–17)
AST SERPL-CCNC: 13 U/L — SIGNIFICANT CHANGE UP (ref 10–40)
BILIRUB SERPL-MCNC: 0.5 MG/DL — SIGNIFICANT CHANGE UP (ref 0.2–1.2)
BUN SERPL-MCNC: 61 MG/DL — HIGH (ref 7–23)
CALCIUM SERPL-MCNC: 9.2 MG/DL — SIGNIFICANT CHANGE UP (ref 8.4–10.5)
CHLORIDE SERPL-SCNC: 90 MMOL/L — LOW (ref 96–108)
CO2 SERPL-SCNC: 33 MMOL/L — HIGH (ref 22–31)
CREAT SERPL-MCNC: 1.74 MG/DL — HIGH (ref 0.5–1.3)
GLUCOSE BLDC GLUCOMTR-MCNC: 122 MG/DL — HIGH (ref 70–99)
GLUCOSE BLDC GLUCOMTR-MCNC: 177 MG/DL — HIGH (ref 70–99)
GLUCOSE BLDC GLUCOMTR-MCNC: 219 MG/DL — HIGH (ref 70–99)
GLUCOSE BLDC GLUCOMTR-MCNC: 261 MG/DL — HIGH (ref 70–99)
GLUCOSE SERPL-MCNC: 271 MG/DL — HIGH (ref 70–99)
HCT VFR BLD CALC: 49.5 % — SIGNIFICANT CHANGE UP (ref 39–50)
HGB BLD-MCNC: 15.2 G/DL — SIGNIFICANT CHANGE UP (ref 13–17)
MCHC RBC-ENTMCNC: 25.4 PG — LOW (ref 27–34)
MCHC RBC-ENTMCNC: 30.7 GM/DL — LOW (ref 32–36)
MCV RBC AUTO: 82.8 FL — SIGNIFICANT CHANGE UP (ref 80–100)
PLATELET # BLD AUTO: 277 K/UL — SIGNIFICANT CHANGE UP (ref 150–400)
POTASSIUM SERPL-MCNC: 3.5 MMOL/L — SIGNIFICANT CHANGE UP (ref 3.5–5.3)
POTASSIUM SERPL-SCNC: 3.5 MMOL/L — SIGNIFICANT CHANGE UP (ref 3.5–5.3)
PROT SERPL-MCNC: 7.2 G/DL — SIGNIFICANT CHANGE UP (ref 6–8.3)
RBC # BLD: 5.98 M/UL — HIGH (ref 4.2–5.8)
RBC # FLD: 15.2 % — HIGH (ref 10.3–14.5)
SODIUM SERPL-SCNC: 137 MMOL/L — SIGNIFICANT CHANGE UP (ref 135–145)
WBC # BLD: 10.5 K/UL — SIGNIFICANT CHANGE UP (ref 3.8–10.5)
WBC # FLD AUTO: 10.5 K/UL — SIGNIFICANT CHANGE UP (ref 3.8–10.5)

## 2019-11-11 PROCEDURE — 99231 SBSQ HOSP IP/OBS SF/LOW 25: CPT

## 2019-11-11 PROCEDURE — 99232 SBSQ HOSP IP/OBS MODERATE 35: CPT | Mod: GC

## 2019-11-11 RX ORDER — INSULIN GLARGINE 100 [IU]/ML
65 INJECTION, SOLUTION SUBCUTANEOUS AT BEDTIME
Refills: 0 | Status: DISCONTINUED | OUTPATIENT
Start: 2019-11-11 | End: 2019-11-12

## 2019-11-11 RX ORDER — SODIUM CHLORIDE 0.65 %
1 AEROSOL, SPRAY (ML) NASAL THREE TIMES A DAY
Refills: 0 | Status: DISCONTINUED | OUTPATIENT
Start: 2019-11-11 | End: 2019-11-17

## 2019-11-11 RX ORDER — INSULIN LISPRO 100/ML
26 VIAL (ML) SUBCUTANEOUS
Refills: 0 | Status: DISCONTINUED | OUTPATIENT
Start: 2019-11-11 | End: 2019-11-14

## 2019-11-11 RX ORDER — TRAMADOL HYDROCHLORIDE 50 MG/1
25 TABLET ORAL ONCE
Refills: 0 | Status: DISCONTINUED | OUTPATIENT
Start: 2019-11-11 | End: 2019-11-11

## 2019-11-11 RX ADMIN — INSULIN GLARGINE 65 UNIT(S): 100 INJECTION, SOLUTION SUBCUTANEOUS at 21:37

## 2019-11-11 RX ADMIN — Medication 4: at 08:07

## 2019-11-11 RX ADMIN — Medication 6: at 12:20

## 2019-11-11 RX ADMIN — Medication 100 MILLIGRAM(S): at 05:36

## 2019-11-11 RX ADMIN — Medication 24 UNIT(S): at 08:07

## 2019-11-11 RX ADMIN — Medication 81 MILLIGRAM(S): at 11:48

## 2019-11-11 RX ADMIN — Medication 22 UNIT(S): at 17:17

## 2019-11-11 RX ADMIN — OXYCODONE HYDROCHLORIDE 5 MILLIGRAM(S): 5 TABLET ORAL at 11:25

## 2019-11-11 RX ADMIN — Medication 10 UNIT(S): at 12:20

## 2019-11-11 RX ADMIN — OXYCODONE HYDROCHLORIDE 5 MILLIGRAM(S): 5 TABLET ORAL at 10:32

## 2019-11-11 RX ADMIN — ENOXAPARIN SODIUM 130 MILLIGRAM(S): 100 INJECTION SUBCUTANEOUS at 05:36

## 2019-11-11 RX ADMIN — OXYCODONE HYDROCHLORIDE 5 MILLIGRAM(S): 5 TABLET ORAL at 22:46

## 2019-11-11 RX ADMIN — BUMETANIDE 2 MILLIGRAM(S): 0.25 INJECTION INTRAMUSCULAR; INTRAVENOUS at 05:36

## 2019-11-11 RX ADMIN — TRAMADOL HYDROCHLORIDE 25 MILLIGRAM(S): 50 TABLET ORAL at 20:23

## 2019-11-11 RX ADMIN — OXYCODONE HYDROCHLORIDE 5 MILLIGRAM(S): 5 TABLET ORAL at 22:16

## 2019-11-11 RX ADMIN — Medication 100 MILLIGRAM(S): at 17:17

## 2019-11-11 RX ADMIN — TRAMADOL HYDROCHLORIDE 25 MILLIGRAM(S): 50 TABLET ORAL at 20:43

## 2019-11-11 NOTE — PROGRESS NOTE ADULT - ASSESSMENT
64 year old male with CKD Hypertension, Hyperlipidemia, Atrial Fibrillation, s/p DCCV, Coronary Artery Disease, NSTEMI, (2/2014), s/p PCI to mid LAD, Proximal LCx, Distal LCx (2/14, [facility], [facility]), Congestive Heart Failure,  Diabetes Mellitus, CVA, Severe Left Internal Carotid Disease, s/p CEA (2/2014) recently admitted for hydropneumothorax, a drainage of effusion and sampling of fluid and analysis was suggested however patient refused at that time.  Pt. presents  today with worsening SOB and cough with worsening pl. effusion         1- ckd III   2- proteinuria   3- chf   4- HTN   5- pleural effusion  s/p thoracentesis  6- a fib    creatinine slightly better   will need arb in near future for proteinuria   bumex 2 mg po qd   given alkalsosis worsening change metolazone 5 mg qod    cont toprol xl 100 mg qd

## 2019-11-11 NOTE — PROGRESS NOTE ADULT - PROBLEM SELECTOR PLAN 1
uncontrolled DM2 c/b neuropathy, CKD3, macrovascular disease (CAD and CVA)  A1c 11.7%, goal A1c ~7  FS goal 100-180, above goal however improved   FS premeal and qhs  carb consistent diet  Recommend Lantus units qhs   Will increase to Humalog 26/10/22 units premeal (hold if NPO)   Recommend mod premeal and qhs sliding scale   Patient to follow up with Endocrinologist, Dr Real, on discharge  Continue Basal/bolus, dose to be determined  Patient to follow up with  ophthalmology as outpt uncontrolled DM2 c/b neuropathy, CKD3, macrovascular disease (CAD and CVA)  A1c 11.7%, goal A1c ~7  FS goal 100-180, above goal however improved   FS premeal and qhs  carb consistent diet  Recommend Lantus 65 units qhs   Will increase to Humalog 26/10/22 units premeal (hold if NPO)   Recommend mod premeal and qhs sliding scale   Patient to follow up with Endocrinologist, Dr Real, on discharge  Continue Basal/bolus, dose to be determined  Patient to follow up with  ophthalmology as outpt uncontrolled DM2 c/b neuropathy, CKD3, macrovascular disease (CAD and CVA)  A1c 11.7%, goal A1c ~7  FS goal 100-180, above goal however improved   FS premeal and qhs  carb consistent diet  Recommend Lantus 65 units qhs   Will increase to Humalog 26/10/22 units premeal (hold if NPO)   Recommend mod premeal and qhs sliding scale   Patient to follow up with Endocrinologist, Dr Real, on discharge: 1/3/20 at 215pm  Continue Basal/bolus, dose to be determined  Patient to follow up with  ophthalmology as outpt

## 2019-11-11 NOTE — PROGRESS NOTE ADULT - SUBJECTIVE AND OBJECTIVE BOX
CARDIOLOGY FOLLOW UP - Dr. Mazariegos    CC no cp or sob        PHYSICAL EXAM:  T(C): 36.7 (11-11-19 @ 10:01), Max: 37.2 (11-10-19 @ 20:24)  HR: 92 (11-11-19 @ 10:01) (79 - 104)  BP: 147/73 (11-11-19 @ 10:01) (124/63 - 147/73)  RR: 18 (11-11-19 @ 10:01) (18 - 18)  SpO2: 94% (11-11-19 @ 10:01) (93% - 96%)  Wt(kg): --  I&O's Summary    10 Nov 2019 07:01  -  11 Nov 2019 07:00  --------------------------------------------------------  IN: 1100 mL / OUT: 1265 mL / NET: -165 mL    11 Nov 2019 07:01  -  11 Nov 2019 12:56  --------------------------------------------------------  IN: 0 mL / OUT: 600 mL / NET: -600 mL        Appearance: Normal	  Cardiovascular: Normal S1 S2, irregular   Respiratory: diminished 	  Gastrointestinal:  Soft, Non-tender, + BS	  Extremities: Normal range of motion, bl LE +  edema        MEDICATIONS  (STANDING):  aspirin enteric coated 81 milliGRAM(s) Oral daily  buMETAnide 2 milliGRAM(s) Oral daily  dextrose 5%. 1000 milliLiter(s) (50 mL/Hr) IV Continuous <Continuous>  dextrose 50% Injectable 12.5 Gram(s) IV Push once  dextrose 50% Injectable 25 Gram(s) IV Push once  dextrose 50% Injectable 25 Gram(s) IV Push once  influenza   Vaccine 0.5 milliLiter(s) IntraMuscular once  insulin glargine Injectable (LANTUS) 70 Unit(s) SubCutaneous at bedtime  insulin lispro (HumaLOG) corrective regimen sliding scale   SubCutaneous at bedtime  insulin lispro (HumaLOG) corrective regimen sliding scale   SubCutaneous three times a day before meals  insulin lispro Injectable (HumaLOG) 24 Unit(s) SubCutaneous before breakfast  insulin lispro Injectable (HumaLOG) 22 Unit(s) SubCutaneous before dinner  insulin lispro Injectable (HumaLOG) 10 Unit(s) SubCutaneous before lunch  metoprolol succinate  milliGRAM(s) Oral daily  pregabalin 100 milliGRAM(s) Oral two times a day      TELEMETRY: afib hr 90-120s 	    ECG:  	  RADIOLOGY:   DIAGNOSTIC TESTING:  [ ] Echocardiogram:  [ ]  Catheterization:  [ ] Stress Test:    OTHER: 	    LABS:	 	    Troponin T, High Sensitivity Result: 95 ng/L [0 - 51] (11-05 @ 15:07)  Troponin T, High Sensitivity Result: 88 ng/L [0 - 51] (11-05 @ 13:30)                          14.6   9.47  )-----------( 253      ( 10 Nov 2019 09:28 )             48.4     11-11    137  |  90<L>  |  61<H>  ----------------------------<  271<H>  3.5   |  33<H>  |  1.74<H>    Ca    9.2      11 Nov 2019 11:47    TPro  7.2  /  Alb  3.2<L>  /  TBili  0.5  /  DBili  x   /  AST  13  /  ALT  6<L>  /  AlkPhos  89  11-11

## 2019-11-11 NOTE — PROGRESS NOTE ADULT - ASSESSMENT
64 year old male with Hypertension, Hyperlipidemia, Atrial Fibrillation, s/p DCCV, Coronary Artery Disease, NSTEMI, (2/2014), s/p PCI to mid LAD, Proximal LCx, Distal LCx (2/14, [facility], [facility]), Congestive Heart Failure, Normal Ejection Fraction, Diabetes Mellitus, CVA, Severe Left Internal Carotid Disease, s/p CEA (2/2014) presenting with worsening SOB, cough.     1.Right-sided Pleural effusion, Hydropneumothorax  -s/p right thoracentesis 11/8/0219, bloody exudate  -f/u studies, cytology  -pulmo/thoracic f/u for further management;  plan for R pigtail placement tomorrow    2. Chronic Congestive heart failure, Diastolic.   -stable, minimal LE edema, no evidence of ADHF on exam   -continue current diuresis regimen, bumex/ metol to Q 48  -recent echo with no evidence of pericardial effusion, mod LAE, grossly borderline LV sys dysfx EF 45% likely underestimated due to AF     3. Atrial Fibrillation, chronic   -stable, c/w metoprolol succinate ER 100mg daily  -a/c on hold per pulm . plan for R pigtail placement tomorrow    4. Coronary Artery Disease. S/P PCI to LAD, LCx.  -stable, Continue ASA 81mg and statin    dvt ppx

## 2019-11-11 NOTE — PROGRESS NOTE ADULT - SUBJECTIVE AND OBJECTIVE BOX
CHIEF COMPLAINT:Patient is a 64y old  Male who presents with a chief complaint of CHF (10 Nov 2019 15:03)    	        PAST MEDICAL & SURGICAL HISTORY:  Neuropathy  CAD (coronary artery disease)  MI (myocardial infarction): circa 2014  Atrial fibrillation  TIA (transient ischemic attack)  DM type 2 (diabetes mellitus, type 2)  HLD (hyperlipidemia)  HTN (hypertension), benign  S/P carotid endarterectomy: left  Status post angioplasty with stent: [facility] x 3 2/7/2014          REVIEW OF SYSTEMS:  CONSTITUTIONAL: No fever, weight loss, or fatigue  EYES: No eye pain, visual disturbances, or discharge  NECK: No pain or stiffness  RESPIRATORY:sob dec   CARDIOVASCULAR: No chest pain, palpitations, passing out, dizziness,  GASTROINTESTINAL: No abdominal or epigastric pain. No nausea, vomiting, or hematemesis; No diarrhea or constipation. No melena or hematochezia.  GENITOURINARY: No dysuria, frequency, hematuria, or incontinence  NEUROLOGICAL: No headaches, memory loss, loss of strength, numbness, or tremors      Medications:  MEDICATIONS  (STANDING):  aspirin enteric coated 81 milliGRAM(s) Oral daily  buMETAnide 2 milliGRAM(s) Oral daily  dextrose 5%. 1000 milliLiter(s) (50 mL/Hr) IV Continuous <Continuous>  dextrose 50% Injectable 12.5 Gram(s) IV Push once  dextrose 50% Injectable 25 Gram(s) IV Push once  dextrose 50% Injectable 25 Gram(s) IV Push once  influenza   Vaccine 0.5 milliLiter(s) IntraMuscular once  insulin glargine Injectable (LANTUS) 70 Unit(s) SubCutaneous at bedtime  insulin lispro (HumaLOG) corrective regimen sliding scale   SubCutaneous at bedtime  insulin lispro (HumaLOG) corrective regimen sliding scale   SubCutaneous three times a day before meals  insulin lispro Injectable (HumaLOG) 24 Unit(s) SubCutaneous before breakfast  insulin lispro Injectable (HumaLOG) 22 Unit(s) SubCutaneous before dinner  insulin lispro Injectable (HumaLOG) 10 Unit(s) SubCutaneous before lunch  metoprolol succinate  milliGRAM(s) Oral daily  pregabalin 100 milliGRAM(s) Oral two times a day    MEDICATIONS  (PRN):  acetaminophen   Tablet .. 650 milliGRAM(s) Oral every 4 hours PRN Moderate Pain (4 - 6)  dextrose 40% Gel 15 Gram(s) Oral once PRN Blood Glucose LESS THAN 70 milliGRAM(s)/deciliter  glucagon  Injectable 1 milliGRAM(s) IntraMuscular once PRN Glucose LESS THAN 70 milligrams/deciliter  oxyCODONE    IR 5 milliGRAM(s) Oral two times a day PRN Severe Pain (7 - 10)  sodium chloride 0.65% Nasal 1 Spray(s) Both Nostrils three times a day PRN Nasal Congestion    	    PHYSICAL EXAM:  T(C): 36.7 (11-11-19 @ 10:01), Max: 37.2 (11-10-19 @ 20:24)  HR: 92 (11-11-19 @ 10:01) (79 - 104)  BP: 147/73 (11-11-19 @ 10:01) (124/63 - 147/73)  RR: 18 (11-11-19 @ 10:01) (18 - 18)  SpO2: 94% (11-11-19 @ 10:01) (93% - 98%)  Wt(kg): --  I&O's Summary    10 Nov 2019 07:01  -  11 Nov 2019 07:00  --------------------------------------------------------  IN: 1100 mL / OUT: 1265 mL / NET: -165 mL    11 Nov 2019 07:01  -  11 Nov 2019 11:09  --------------------------------------------------------  IN: 0 mL / OUT: 600 mL / NET: -600 mL        Appearance: Normal	  HEENT:   Normal oral mucosa, PERRL, EOMI	  Lymphatic: No lymphadenopathy  Cardiovascular: Normal S1 S2, No JVD,  Respiratory: dec bs   Psychiatry: A & O x 3,  Gastrointestinal:  Soft, Non-tender, + BS	  Skin: No rashes, No ecchymoses, No cyanosis	  Neurologic: Non-focal  Extremities: pvd      TELEMETRY: 	    ECG:  	  RADIOLOGY:  OTHER: 	  	  LABS:	 	    CARDIAC MARKERS:                                14.6   9.47  )-----------( 253      ( 10 Nov 2019 09:28 )             48.4     11-10    134<L>  |  89<L>  |  63<H>  ----------------------------<  247<H>  3.6   |  36<H>  |  1.98<H>    Ca    9.2      10 Nov 2019 06:10      proBNP:   Lipid Profile:   HgA1c:   TSH:

## 2019-11-11 NOTE — PROGRESS NOTE ADULT - SUBJECTIVE AND OBJECTIVE BOX
Follow-up Pulm Progress Note    No new respiratory events overnight.  Denies SOB/CP.   Sats 93% RA    Medications:  MEDICATIONS  (STANDING):  aspirin enteric coated 81 milliGRAM(s) Oral daily  buMETAnide 2 milliGRAM(s) Oral daily  dextrose 5%. 1000 milliLiter(s) (50 mL/Hr) IV Continuous <Continuous>  dextrose 50% Injectable 12.5 Gram(s) IV Push once  dextrose 50% Injectable 25 Gram(s) IV Push once  dextrose 50% Injectable 25 Gram(s) IV Push once  influenza   Vaccine 0.5 milliLiter(s) IntraMuscular once  insulin glargine Injectable (LANTUS) 70 Unit(s) SubCutaneous at bedtime  insulin lispro (HumaLOG) corrective regimen sliding scale   SubCutaneous at bedtime  insulin lispro (HumaLOG) corrective regimen sliding scale   SubCutaneous three times a day before meals  insulin lispro Injectable (HumaLOG) 24 Unit(s) SubCutaneous before breakfast  insulin lispro Injectable (HumaLOG) 22 Unit(s) SubCutaneous before dinner  insulin lispro Injectable (HumaLOG) 10 Unit(s) SubCutaneous before lunch  metoprolol succinate  milliGRAM(s) Oral daily  pregabalin 100 milliGRAM(s) Oral two times a day    MEDICATIONS  (PRN):  acetaminophen   Tablet .. 650 milliGRAM(s) Oral every 4 hours PRN Moderate Pain (4 - 6)  dextrose 40% Gel 15 Gram(s) Oral once PRN Blood Glucose LESS THAN 70 milliGRAM(s)/deciliter  glucagon  Injectable 1 milliGRAM(s) IntraMuscular once PRN Glucose LESS THAN 70 milligrams/deciliter  oxyCODONE    IR 5 milliGRAM(s) Oral two times a day PRN Severe Pain (7 - 10)  sodium chloride 0.65% Nasal 1 Spray(s) Both Nostrils three times a day PRN Nasal Congestion          Vital Signs Last 24 Hrs  T(C): 36.8 (11 Nov 2019 04:16), Max: 37.2 (10 Nov 2019 20:24)  T(F): 98.2 (11 Nov 2019 04:16), Max: 99 (10 Nov 2019 20:24)  HR: 82 (11 Nov 2019 04:16) (79 - 104)  BP: 130/79 (11 Nov 2019 04:16) (124/63 - 147/64)  BP(mean): --  RR: 18 (11 Nov 2019 04:16) (18 - 18)  SpO2: 94% (11 Nov 2019 04:16) (93% - 98%)          11-10 @ 07:01  -  11-11 @ 07:00  --------------------------------------------------------  IN: 1100 mL / OUT: 1265 mL / NET: -165 mL          LABS:                        14.6   9.47  )-----------( 253      ( 10 Nov 2019 09:28 )             48.4     11-10    134<L>  |  89<L>  |  63<H>  ----------------------------<  247<H>  3.6   |  36<H>  |  1.98<H>    Ca    9.2      10 Nov 2019 06:10            CAPILLARY BLOOD GLUCOSE      POCT Blood Glucose.: 219 mg/dL (11 Nov 2019 07:49)                    Fluid characteristics  PLEURAL 11-08 @ 18:05  pH 7.74    tprot 3.9    Cell count  Appearance Sl Bloody  Fluid type --  BF lymph 77  color Red  eosinophil 2  PMN 11  Mesothelial 2  Monocyte 8  Other body cells --      CULTURES:     Culture - Body Fluid with Gram Stain (collected 11-08-19 @ 22:02)  Source: .Body Fluid Pleural Fluid  Gram Stain (11-08-19 @ 23:14):    polymorphonuclear leukocytes seen    No organisms seen    by cytocentrifuge  Preliminary Report (11-09-19 @ 17:35):    No growth          Physical Examination:  PULM: diminished BS R base  CVS: irreg irreg    RADIOLOGY REVIEWED  CT chest:   FINDINGS:    LUNGS AND AIRWAYS: The right lower lobe passive atelectasis of the right   lobe adjacent to the pleural effusion has decreased. Atelectasis adjacent   to the effusion in the right middle lobe is unchanged. Left lower lobe   linear atelectasis is unchanged. Bilateral calcified pulmonary nodules.    PLEURA: Moderate right-sided now loculated pleural effusion, intervally   decreased in size since 11/5/2019.    MEDIASTINUM AND STEPHON: No enlarged chest lymph nodes. The visualized   thyroid gland is unremarkable. The esophagus is unremarkable.    VESSELS: The aorta is normal in caliber. Atheromatousdisease of the   aorta. The pulmonary artery is enlarged which can suggest pulmonary   arterial hypertension.    HEART: Heart size is normal. No pericardial effusion. Coronary artery   calcifications. Aortic valve calcifications.    CHEST WALL AND LOWER NECK: Within normal limits.    VISUALIZED UPPER ABDOMEN: Unremarkable.    BONES: Degenerative changes of the spine.    IMPRESSION:   1.  Intervally decreased right-sided moderate-sized pleural effusion   status post thoracentesis.  2.  No pneumothorax.

## 2019-11-11 NOTE — PROGRESS NOTE ADULT - SUBJECTIVE AND OBJECTIVE BOX
[facility]   [phone]  RENAL FOLLOW UP NOTE  --------------------------------------------------------------------------------  Chief Complaint:    24 hour events/subjective:    seen earlier.   still with dyspnea     PAST HISTORY  --------------------------------------------------------------------------------  No significant changes to PMH, PSH, FHx, SHx, unless otherwise noted    ALLERGIES & MEDICATIONS  --------------------------------------------------------------------------------  Allergies    No Known Allergies    Intolerances      Standing Inpatient Medications  aspirin enteric coated 81 milliGRAM(s) Oral daily  buMETAnide 2 milliGRAM(s) Oral daily  dextrose 5%. 1000 milliLiter(s) IV Continuous <Continuous>  dextrose 50% Injectable 12.5 Gram(s) IV Push once  dextrose 50% Injectable 25 Gram(s) IV Push once  dextrose 50% Injectable 25 Gram(s) IV Push once  influenza   Vaccine 0.5 milliLiter(s) IntraMuscular once  insulin glargine Injectable (LANTUS) 65 Unit(s) SubCutaneous at bedtime  insulin lispro (HumaLOG) corrective regimen sliding scale   SubCutaneous at bedtime  insulin lispro (HumaLOG) corrective regimen sliding scale   SubCutaneous three times a day before meals  insulin lispro Injectable (HumaLOG) 22 Unit(s) SubCutaneous before dinner  insulin lispro Injectable (HumaLOG) 10 Unit(s) SubCutaneous before lunch  insulin lispro Injectable (HumaLOG) 26 Unit(s) SubCutaneous before breakfast  metoprolol succinate  milliGRAM(s) Oral daily  pregabalin 100 milliGRAM(s) Oral two times a day  traMADol 25 milliGRAM(s) Oral once    PRN Inpatient Medications  acetaminophen   Tablet .. 650 milliGRAM(s) Oral every 4 hours PRN  dextrose 40% Gel 15 Gram(s) Oral once PRN  glucagon  Injectable 1 milliGRAM(s) IntraMuscular once PRN  oxyCODONE    IR 5 milliGRAM(s) Oral two times a day PRN  sodium chloride 0.65% Nasal 1 Spray(s) Both Nostrils three times a day PRN      REVIEW OF SYSTEMS  --------------------------------------------------------------------------------    Gen: denies fevers/chills,  CVS: denies chest pain/palpitations  Resp: denies worsening SOB/Cough  GI: Denies N/V/Abd pain  : Denies dysuria/oliguria/hematuria    All other systems were reviewed and are negative, except as noted.    VITALS/PHYSICAL EXAM  --------------------------------------------------------------------------------  T(C): 36.8 (11-11-19 @ 15:57), Max: 37.2 (11-10-19 @ 20:24)  HR: 98 (11-11-19 @ 15:57) (70 - 104)  BP: 118/73 (11-11-19 @ 15:57) (118/73 - 147/73)  RR: 19 (11-11-19 @ 15:57) (18 - 19)  SpO2: 94% (11-11-19 @ 15:57) (94% - 97%)  Wt(kg): --        11-10-19 @ 07:01  -  11-11-19 @ 07:00  --------------------------------------------------------  IN: 1100 mL / OUT: 1265 mL / NET: -165 mL    11-11-19 @ 07:01  -  11-11-19 @ 19:18  --------------------------------------------------------  IN: 240 mL / OUT: 600 mL / NET: -360 mL      Physical Exam:  	  Gen:  + obese appears with mildly tachypneic  on O2    	no jvd   	Pulm: decrease bs  R base upto 3/4  no rales or ronchi or wheezing  	CV: RRR, S1S2; no rub  	Abd: +BS, soft, nontender/nondistended  	: No suprapubic tenderness  	UE: Warm, no cyanosis  no clubbing,  no edema  	LE: Warm, no cyanosis  no clubbing, no edema    	    LABS/STUDIES  --------------------------------------------------------------------------------              15.2   10.50 >-----------<  277      [11-11-19 @ 13:37]              49.5     137  |  90  |  61  ----------------------------<  271      [11-11-19 @ 11:47]  3.5   |  33  |  1.74        Ca     9.2     [11-11-19 @ 11:47]    TPro  7.2  /  Alb  3.2  /  TBili  0.5  /  DBili  x   /  AST  13  /  ALT  6   /  AlkPhos  89  [11-11-19 @ 11:47]          Creatinine Trend:  SCr 1.74 [11-11 @ 11:47]  SCr 1.98 [11-10 @ 06:10]  SCr 1.98 [11-09 @ 06:39]  SCr 2.01 [11-08 @ 06:24]  SCr 2.02 [11-07 @ 06:23]              Urinalysis - [10-17-19 @ 04:40]      Color Light Yellow / Appearance Clear / SG 1.015 / pH 6.5      Gluc 500 mg/dL / Ketone Negative  / Bili Negative / Urobili Negative       Blood Small / Protein 300 mg/dL / Leuk Est Negative / Nitrite Negative      RBC 8 / WBC 3 / Hyaline 0 / Gran  / Sq Epi  / Non Sq Epi 0 / Bacteria Negative    Urine Creatinine 76      [11-06-19 @ 08:10]  Urine Protein 134      [11-06-19 @ 08:10]    HbA1c 11.7      [11-07-19 @ 09:14]  TSH 1.71      [12-28-18 @ 14:58]

## 2019-11-11 NOTE — PROGRESS NOTE ADULT - PROBLEM SELECTOR PLAN 1
-Previously noted hydropneumothorax on CT from 10/16, not present on current CT  S/p R thoracentesis 11/8, 490cc bloody fluid drained. Effusion exudative by Light's Criteria. F/u cyto.  -CT chest with decrease in R effusion   -IR consult placed, plan for R pigtail placement tomorrow  -Lovenox d/c'd. Coags ordered with AM labs per IR.  -Keep sats >92% with supplemental O2 as needed. Nares irritated from O2 - humidify O2 and start saline nasal spray TID PRN. -Previously noted hydropneumothorax on CT from 10/16, not present on current CT  S/p R thoracentesis 11/8, 490cc bloody fluid drained. Effusion exudative by Light's Criteria. F/u cyto.  -CT chest with decrease in R effusion. Appearance of trapped lung visible from CT in october not visible, lung appeared to re-expanded.  -IR consult placed, plan for R pigtail placement tomorrow  -Lovenox d/c'd. Coags ordered with AM labs per IR.  -Keep sats >92% with supplemental O2 as needed. Nares irritated from O2 - humidify O2 and start saline nasal spray TID PRN.

## 2019-11-11 NOTE — PROGRESS NOTE ADULT - SUBJECTIVE AND OBJECTIVE BOX
Chief Complaint: Hyperglycemia in patient with DM 2    History: Patient seen and examined at bedside     MEDICATIONS  (STANDING):  aspirin enteric coated 81 milliGRAM(s) Oral daily  buMETAnide 2 milliGRAM(s) Oral daily  dextrose 5%. 1000 milliLiter(s) (50 mL/Hr) IV Continuous <Continuous>  dextrose 50% Injectable 12.5 Gram(s) IV Push once  dextrose 50% Injectable 25 Gram(s) IV Push once  dextrose 50% Injectable 25 Gram(s) IV Push once  influenza   Vaccine 0.5 milliLiter(s) IntraMuscular once  insulin glargine Injectable (LANTUS) 70 Unit(s) SubCutaneous at bedtime  insulin lispro (HumaLOG) corrective regimen sliding scale   SubCutaneous at bedtime  insulin lispro (HumaLOG) corrective regimen sliding scale   SubCutaneous three times a day before meals  insulin lispro Injectable (HumaLOG) 24 Unit(s) SubCutaneous before breakfast  insulin lispro Injectable (HumaLOG) 22 Unit(s) SubCutaneous before dinner  insulin lispro Injectable (HumaLOG) 10 Unit(s) SubCutaneous before lunch  metoprolol succinate  milliGRAM(s) Oral daily  pregabalin 100 milliGRAM(s) Oral two times a day    MEDICATIONS  (PRN):  acetaminophen   Tablet .. 650 milliGRAM(s) Oral every 4 hours PRN Moderate Pain (4 - 6)  dextrose 40% Gel 15 Gram(s) Oral once PRN Blood Glucose LESS THAN 70 milliGRAM(s)/deciliter  glucagon  Injectable 1 milliGRAM(s) IntraMuscular once PRN Glucose LESS THAN 70 milligrams/deciliter  oxyCODONE    IR 5 milliGRAM(s) Oral two times a day PRN Severe Pain (7 - 10)  sodium chloride 0.65% Nasal 1 Spray(s) Both Nostrils three times a day PRN Nasal Congestion    PHYSICAL EXAM:  VITALS: T(C): 36.6 (11-11-19 @ 13:50)  T(F): 97.8 (11-11-19 @ 13:50), Max: 99 (11-10-19 @ 20:24)  HR: 70 (11-11-19 @ 13:50) (70 - 104)  BP: 140/85 (11-11-19 @ 13:50) (124/63 - 147/73)  RR:  (18 - 18)  SpO2:  (93% - 97%)  Wt(kg): 132kg   GENERAL: NAD, well-groomed, well-developed  RESPIRATORY: Clear to auscultation bilaterally; No rales, rhonchi, wheezing, or rubs  CARDIOVASCULAR: Regular rate and rhythm; No murmurs; no peripheral edema  PSYCH: Alert and oriented x 3, reactive affect    POCT Blood Glucose.: 261 mg/dL (11-11-19 @ 12:16) H10+6  POCT Blood Glucose.: 219 mg/dL (11-11-19 @ 07:49) H24+4  POCT Blood Glucose.: 174 mg/dL (11-10-19 @ 21:15) L60  POCT Blood Glucose.: 177 mg/dL (11-10-19 @ 16:31) H22+2  POCT Blood Glucose.: 301 mg/dL (11-10-19 @ 11:31) H10+8  POCT Blood Glucose.: 247 mg/dL (11-10-19 @ 07:58) H20+4  POCT Blood Glucose.: 268 mg/dL (11-09-19 @ 23:02)  POCT Blood Glucose.: 160 mg/dL (11-09-19 @ 17:02)  POCT Blood Glucose.: 218 mg/dL (11-09-19 @ 11:41)  POCT Blood Glucose.: 251 mg/dL (11-09-19 @ 07:51)  POCT Blood Glucose.: 277 mg/dL (11-08-19 @ 21:05)  POCT Blood Glucose.: 184 mg/dL (11-08-19 @ 17:30)      11-11    137  |  90<L>  |  61<H>  ----------------------------<  271<H>  3.5   |  33<H>  |  1.74<H>    EGFR if : 47<L>  EGFR if non : 41<L>    Ca    9.2      11-11    TPro  7.2  /  Alb  3.2<L>  /  TBili  0.5  /  DBili  x   /  AST  13  /  ALT  6<L>  /  AlkPhos  89  11-11    Hemoglobin A1C, Whole Blood: 11.7 % <H> [4.0 - 5.6] (11-07-19 @ 09:14)  Hemoglobin A1C, Whole Blood: 10.3 % <H> [4.0 - 5.6] (10-17-19 @ 07:43) Chief Complaint: Hyperglycemia in patient with DM 2    History: Patient seen and examined at bedside with daughter present. Patient states he is tolerating diet, no nausea, vomiting, diarrhea or constipation. NAD. Reports Refusing Lantus 70 units last night as his insulin requirements were much less at home and despite increasing basal insulin FS remain in 200s.     MEDICATIONS  (STANDING):  aspirin enteric coated 81 milliGRAM(s) Oral daily  buMETAnide 2 milliGRAM(s) Oral daily  dextrose 5%. 1000 milliLiter(s) (50 mL/Hr) IV Continuous <Continuous>  dextrose 50% Injectable 12.5 Gram(s) IV Push once  dextrose 50% Injectable 25 Gram(s) IV Push once  dextrose 50% Injectable 25 Gram(s) IV Push once  influenza   Vaccine 0.5 milliLiter(s) IntraMuscular once  insulin glargine Injectable (LANTUS) 70 Unit(s) SubCutaneous at bedtime  insulin lispro (HumaLOG) corrective regimen sliding scale   SubCutaneous at bedtime  insulin lispro (HumaLOG) corrective regimen sliding scale   SubCutaneous three times a day before meals  insulin lispro Injectable (HumaLOG) 24 Unit(s) SubCutaneous before breakfast  insulin lispro Injectable (HumaLOG) 22 Unit(s) SubCutaneous before dinner  insulin lispro Injectable (HumaLOG) 10 Unit(s) SubCutaneous before lunch  metoprolol succinate  milliGRAM(s) Oral daily  pregabalin 100 milliGRAM(s) Oral two times a day    MEDICATIONS  (PRN):  acetaminophen   Tablet .. 650 milliGRAM(s) Oral every 4 hours PRN Moderate Pain (4 - 6)  dextrose 40% Gel 15 Gram(s) Oral once PRN Blood Glucose LESS THAN 70 milliGRAM(s)/deciliter  glucagon  Injectable 1 milliGRAM(s) IntraMuscular once PRN Glucose LESS THAN 70 milligrams/deciliter  oxyCODONE    IR 5 milliGRAM(s) Oral two times a day PRN Severe Pain (7 - 10)  sodium chloride 0.65% Nasal 1 Spray(s) Both Nostrils three times a day PRN Nasal Congestion    PHYSICAL EXAM:  VITALS: T(C): 36.6 (11-11-19 @ 13:50)  T(F): 97.8 (11-11-19 @ 13:50), Max: 99 (11-10-19 @ 20:24)  HR: 70 (11-11-19 @ 13:50) (70 - 104)  BP: 140/85 (11-11-19 @ 13:50) (124/63 - 147/73)  RR:  (18 - 18)  SpO2:  (93% - 97%)  Wt(kg): 132kg   GENERAL: NAD, well-groomed, obese elderly male   RESPIRATORY: Clear to auscultation bilaterally; No rales, rhonchi, wheezing, or rubs  CARDIOVASCULAR: Regular rate and rhythm; No murmurs; no peripheral edema  PSYCH: Alert and oriented x 3, reactive affect    POCT Blood Glucose.: 261 mg/dL (11-11-19 @ 12:16) H10+6  POCT Blood Glucose.: 219 mg/dL (11-11-19 @ 07:49) H24+4  POCT Blood Glucose.: 174 mg/dL (11-10-19 @ 21:15)   POCT Blood Glucose.: 177 mg/dL (11-10-19 @ 16:31) H22+2  POCT Blood Glucose.: 301 mg/dL (11-10-19 @ 11:31) H10+8  POCT Blood Glucose.: 247 mg/dL (11-10-19 @ 07:58) H20+4  POCT Blood Glucose.: 268 mg/dL (11-09-19 @ 23:02)  POCT Blood Glucose.: 160 mg/dL (11-09-19 @ 17:02)  POCT Blood Glucose.: 218 mg/dL (11-09-19 @ 11:41)  POCT Blood Glucose.: 251 mg/dL (11-09-19 @ 07:51)  POCT Blood Glucose.: 277 mg/dL (11-08-19 @ 21:05)  POCT Blood Glucose.: 184 mg/dL (11-08-19 @ 17:30)      11-11    137  |  90<L>  |  61<H>  ----------------------------<  271<H>  3.5   |  33<H>  |  1.74<H>    EGFR if : 47<L>  EGFR if non : 41<L>    Ca    9.2      11-11    TPro  7.2  /  Alb  3.2<L>  /  TBili  0.5  /  DBili  x   /  AST  13  /  ALT  6<L>  /  AlkPhos  89  11-11    Hemoglobin A1C, Whole Blood: 11.7 % <H> [4.0 - 5.6] (11-07-19 @ 09:14)  Hemoglobin A1C, Whole Blood: 10.3 % <H> [4.0 - 5.6] (10-17-19 @ 07:43) Chief Complaint: Hyperglycemia in patient with DM 2    History: Patient seen and examined at bedside with daughter present. Patient states he is tolerating diet, no nausea, vomiting, diarrhea or constipation. NAD. Reports Refusing Lantus 70 units last night as his insulin requirements were much less at home and despite increasing basal insulin FS remain in 200s.     MEDICATIONS  (STANDING):  aspirin enteric coated 81 milliGRAM(s) Oral daily  buMETAnide 2 milliGRAM(s) Oral daily  dextrose 5%. 1000 milliLiter(s) (50 mL/Hr) IV Continuous <Continuous>  dextrose 50% Injectable 12.5 Gram(s) IV Push once  dextrose 50% Injectable 25 Gram(s) IV Push once  dextrose 50% Injectable 25 Gram(s) IV Push once  influenza   Vaccine 0.5 milliLiter(s) IntraMuscular once  insulin glargine Injectable (LANTUS) 70 Unit(s) SubCutaneous at bedtime  insulin lispro (HumaLOG) corrective regimen sliding scale   SubCutaneous at bedtime  insulin lispro (HumaLOG) corrective regimen sliding scale   SubCutaneous three times a day before meals  insulin lispro Injectable (HumaLOG) 24 Unit(s) SubCutaneous before breakfast  insulin lispro Injectable (HumaLOG) 22 Unit(s) SubCutaneous before dinner  insulin lispro Injectable (HumaLOG) 10 Unit(s) SubCutaneous before lunch  metoprolol succinate  milliGRAM(s) Oral daily  pregabalin 100 milliGRAM(s) Oral two times a day    MEDICATIONS  (PRN):  acetaminophen   Tablet .. 650 milliGRAM(s) Oral every 4 hours PRN Moderate Pain (4 - 6)  dextrose 40% Gel 15 Gram(s) Oral once PRN Blood Glucose LESS THAN 70 milliGRAM(s)/deciliter  glucagon  Injectable 1 milliGRAM(s) IntraMuscular once PRN Glucose LESS THAN 70 milligrams/deciliter  oxyCODONE    IR 5 milliGRAM(s) Oral two times a day PRN Severe Pain (7 - 10)  sodium chloride 0.65% Nasal 1 Spray(s) Both Nostrils three times a day PRN Nasal Congestion    PHYSICAL EXAM:  VITALS: T(C): 36.6 (11-11-19 @ 13:50)  T(F): 97.8 (11-11-19 @ 13:50), Max: 99 (11-10-19 @ 20:24)  HR: 70 (11-11-19 @ 13:50) (70 - 104)  BP: 140/85 (11-11-19 @ 13:50) (124/63 - 147/73)  RR:  (18 - 18)  SpO2:  (93% - 97%)  Wt(kg): 132kg   GENERAL: NAD, well-groomed, obese elderly male   RESPIRATORY: Clear to auscultation bilaterally; No rales, rhonchi, wheezing, or rubs  CARDIOVASCULAR: Regular rate and rhythm; No murmurs  PSYCH: Alert and oriented x 3, reactive affect    POCT Blood Glucose.: 261 mg/dL (11-11-19 @ 12:16) H10+6  POCT Blood Glucose.: 219 mg/dL (11-11-19 @ 07:49) H24+4  POCT Blood Glucose.: 174 mg/dL (11-10-19 @ 21:15)   POCT Blood Glucose.: 177 mg/dL (11-10-19 @ 16:31) H22+2  POCT Blood Glucose.: 301 mg/dL (11-10-19 @ 11:31) H10+8  POCT Blood Glucose.: 247 mg/dL (11-10-19 @ 07:58) H20+4  POCT Blood Glucose.: 268 mg/dL (11-09-19 @ 23:02)  POCT Blood Glucose.: 160 mg/dL (11-09-19 @ 17:02)  POCT Blood Glucose.: 218 mg/dL (11-09-19 @ 11:41)  POCT Blood Glucose.: 251 mg/dL (11-09-19 @ 07:51)  POCT Blood Glucose.: 277 mg/dL (11-08-19 @ 21:05)  POCT Blood Glucose.: 184 mg/dL (11-08-19 @ 17:30)      11-11    137  |  90<L>  |  61<H>  ----------------------------<  271<H>  3.5   |  33<H>  |  1.74<H>    EGFR if : 47<L>  EGFR if non : 41<L>    Ca    9.2      11-11    TPro  7.2  /  Alb  3.2<L>  /  TBili  0.5  /  DBili  x   /  AST  13  /  ALT  6<L>  /  AlkPhos  89  11-11    Hemoglobin A1C, Whole Blood: 11.7 % <H> [4.0 - 5.6] (11-07-19 @ 09:14)  Hemoglobin A1C, Whole Blood: 10.3 % <H> [4.0 - 5.6] (10-17-19 @ 07:43)

## 2019-11-11 NOTE — PROGRESS NOTE ADULT - ASSESSMENT
65 yo M with history of uncontrolled DM2 c/b neuropathy, CKD3, macrovascular disease (CAD, TIA, left carotid artery stenosis), Hypertension, Hyperlipidemia, Atrial Fibrillation on Pradaxa, CHF presenting with SOB and cough secondary to right hydropneumothorax. Endocrine consult requested for management of hyperglycemia in patient with DM2 A1c >10% (high risk patient with high level decision-making). Overnight patient BG remain above goal, 170- 260s.

## 2019-11-11 NOTE — PROGRESS NOTE ADULT - ASSESSMENT
63 y/o M with PMH of CAD s/p stents, HFrEF (EF 40% 12/2018), DMT2, HTN, CKD, NSTEMI, Afib (on Pradaxa). Presents to ED with worsening SOB and productive cough with .   pt  recently hospitalized last month and found to have small R hydropneumothorax of unclear etiology. Plans previous admission for drainage of effusion and sampling of fluid and analysis, but patient refused at that time and agreed for outpt f/u. presents now w/ sob       Problem: Hydropneumothorax.   CT from previous admission with R sided hydropneumothorax of unclear etiology.  -Pt refused drainage at that time.  s/p thorocentesis/c/w exudate  holding  pradaxa/  stop lovenox tonight   pig tail tomorrow        ·  Problem: CHF (congestive heart failure).  Recommendation: -Keep O>I as tolerated.   c/w current meds  cards eval noted    dm /uncontrolled  endo eval noted  fsg riss  c/w insulin regimen as per endo        dm neuropathy  c/w lyrica  oxycodone for severe pain     alex/ ckd  renal f/u   c/w recs as per renal

## 2019-11-12 LAB
ANION GAP SERPL CALC-SCNC: 15 MMOL/L — SIGNIFICANT CHANGE UP (ref 5–17)
APTT BLD: 32.3 SEC — SIGNIFICANT CHANGE UP (ref 27.5–36.3)
BUN SERPL-MCNC: 55 MG/DL — HIGH (ref 7–23)
CALCIUM SERPL-MCNC: 9.5 MG/DL — SIGNIFICANT CHANGE UP (ref 8.4–10.5)
CHLORIDE SERPL-SCNC: 87 MMOL/L — LOW (ref 96–108)
CO2 SERPL-SCNC: 34 MMOL/L — HIGH (ref 22–31)
CREAT SERPL-MCNC: 1.78 MG/DL — HIGH (ref 0.5–1.3)
GLUCOSE BLDC GLUCOMTR-MCNC: 220 MG/DL — HIGH (ref 70–99)
GLUCOSE BLDC GLUCOMTR-MCNC: 240 MG/DL — HIGH (ref 70–99)
GLUCOSE BLDC GLUCOMTR-MCNC: 264 MG/DL — HIGH (ref 70–99)
GLUCOSE BLDC GLUCOMTR-MCNC: 282 MG/DL — HIGH (ref 70–99)
GLUCOSE SERPL-MCNC: 253 MG/DL — HIGH (ref 70–99)
HCT VFR BLD CALC: 51.2 % — HIGH (ref 39–50)
HGB BLD-MCNC: 15.4 G/DL — SIGNIFICANT CHANGE UP (ref 13–17)
INR BLD: 1.1 RATIO — SIGNIFICANT CHANGE UP (ref 0.88–1.16)
MCHC RBC-ENTMCNC: 25.5 PG — LOW (ref 27–34)
MCHC RBC-ENTMCNC: 30.1 GM/DL — LOW (ref 32–36)
MCV RBC AUTO: 84.9 FL — SIGNIFICANT CHANGE UP (ref 80–100)
PLATELET # BLD AUTO: 249 K/UL — SIGNIFICANT CHANGE UP (ref 150–400)
POTASSIUM SERPL-MCNC: 3.4 MMOL/L — LOW (ref 3.5–5.3)
POTASSIUM SERPL-SCNC: 3.4 MMOL/L — LOW (ref 3.5–5.3)
PROTHROM AB SERPL-ACNC: 12.5 SEC — SIGNIFICANT CHANGE UP (ref 10–13.1)
RBC # BLD: 6.03 M/UL — HIGH (ref 4.2–5.8)
RBC # FLD: 15.6 % — HIGH (ref 10.3–14.5)
SODIUM SERPL-SCNC: 136 MMOL/L — SIGNIFICANT CHANGE UP (ref 135–145)
WBC # BLD: 9.1 K/UL — SIGNIFICANT CHANGE UP (ref 3.8–10.5)
WBC # FLD AUTO: 9.1 K/UL — SIGNIFICANT CHANGE UP (ref 3.8–10.5)

## 2019-11-12 PROCEDURE — 71045 X-RAY EXAM CHEST 1 VIEW: CPT | Mod: 26

## 2019-11-12 PROCEDURE — 99232 SBSQ HOSP IP/OBS MODERATE 35: CPT | Mod: GC

## 2019-11-12 PROCEDURE — 32557 INSERT CATH PLEURA W/ IMAGE: CPT | Mod: RT

## 2019-11-12 PROCEDURE — 73562 X-RAY EXAM OF KNEE 3: CPT | Mod: 26,LT

## 2019-11-12 RX ORDER — POTASSIUM CHLORIDE 20 MEQ
40 PACKET (EA) ORAL ONCE
Refills: 0 | Status: COMPLETED | OUTPATIENT
Start: 2019-11-12 | End: 2019-11-12

## 2019-11-12 RX ORDER — OXYCODONE HYDROCHLORIDE 5 MG/1
5 TABLET ORAL
Refills: 0 | Status: DISCONTINUED | OUTPATIENT
Start: 2019-11-13 | End: 2019-11-17

## 2019-11-12 RX ORDER — INSULIN GLARGINE 100 [IU]/ML
40 INJECTION, SOLUTION SUBCUTANEOUS EVERY MORNING
Refills: 0 | Status: DISCONTINUED | OUTPATIENT
Start: 2019-11-12 | End: 2019-11-14

## 2019-11-12 RX ORDER — INSULIN GLARGINE 100 [IU]/ML
40 INJECTION, SOLUTION SUBCUTANEOUS AT BEDTIME
Refills: 0 | Status: DISCONTINUED | OUTPATIENT
Start: 2019-11-12 | End: 2019-11-14

## 2019-11-12 RX ADMIN — BUMETANIDE 2 MILLIGRAM(S): 0.25 INJECTION INTRAMUSCULAR; INTRAVENOUS at 05:41

## 2019-11-12 RX ADMIN — OXYCODONE HYDROCHLORIDE 5 MILLIGRAM(S): 5 TABLET ORAL at 12:30

## 2019-11-12 RX ADMIN — Medication 100 MILLIGRAM(S): at 17:22

## 2019-11-12 RX ADMIN — Medication 100 MILLIGRAM(S): at 05:41

## 2019-11-12 RX ADMIN — INSULIN GLARGINE 40 UNIT(S): 100 INJECTION, SOLUTION SUBCUTANEOUS at 22:03

## 2019-11-12 RX ADMIN — Medication 40 MILLIEQUIVALENT(S): at 17:22

## 2019-11-12 RX ADMIN — Medication 22 UNIT(S): at 17:19

## 2019-11-12 RX ADMIN — Medication 4: at 12:18

## 2019-11-12 RX ADMIN — Medication 10 UNIT(S): at 12:18

## 2019-11-12 RX ADMIN — Medication 6: at 17:19

## 2019-11-12 RX ADMIN — OXYCODONE HYDROCHLORIDE 5 MILLIGRAM(S): 5 TABLET ORAL at 23:18

## 2019-11-12 RX ADMIN — OXYCODONE HYDROCHLORIDE 5 MILLIGRAM(S): 5 TABLET ORAL at 12:18

## 2019-11-12 RX ADMIN — Medication 2: at 22:03

## 2019-11-12 RX ADMIN — Medication 81 MILLIGRAM(S): at 12:18

## 2019-11-12 RX ADMIN — Medication 4: at 08:03

## 2019-11-12 RX ADMIN — OXYCODONE HYDROCHLORIDE 5 MILLIGRAM(S): 5 TABLET ORAL at 22:48

## 2019-11-12 NOTE — PROGRESS NOTE ADULT - SUBJECTIVE AND OBJECTIVE BOX
Vascular & Interventional Radiology Post-Procedure Note    Placement of right chest pleural pigtail catheter    Pre-Procedure Diagnosis: right pleural effusion, recurrent after prior thoracentesis  Post-Procedure Diagnosis: Same as pre.  Indications for Procedure: recurrent after right pleural effusion    : Nida  Assistant(s): Coco    Procedure Details/Findings: ultrasound revealed moderate to large pleural effusion in right lower chest, access with 5 Fr yueh centesis needle in right lower chest, exchanged for 10.2 [place] pigtail, 1.1L of serosanguinous pleural fluid evacuated, pigtail secured in place and connected to gravity    Complications: none  Estimated Blood Loss: Minimal  Specimen: none  Contrast: none  Sedation: none  Patient Condition/Disposition: interventional recovery to floor    Plan:   - monitor for bleeding/shortness of breath  - chest tube to wall suction  - monitor chest tube output  - Ir will follow

## 2019-11-12 NOTE — PROGRESS NOTE ADULT - PROBLEM SELECTOR PLAN 1
-Previously noted hydropneumothorax on CT from 10/16, not present on current CT  -S/p R thoracentesis 11/8, 490cc bloody fluid drained. Effusion exudative by Light's Criteria. F/u cyto.  -CT chest with decrease in R effusion. Appearance of trapped lung visible from CT in october not visible, lung appeared to re-expanded.  -S/p R pigtail placement today in IR, 1L serosanguinous/bloody fluid drained. Keep pigtail to LWS.  -Check CXR  -Keep sats >92% with supplemental O2 as needed. Nares irritated from O2 - humidify O2 and start saline nasal spray TID PRN.

## 2019-11-12 NOTE — PROGRESS NOTE ADULT - ASSESSMENT
63 y/o M with PMH of CAD s/p stents, HFrEF (EF 40% 12/2018), DMT2, HTN, CKD, NSTEMI, Afib (on Pradaxa). Presents to ED with worsening SOB and productive cough with clear/whitish sputum for the past week. He was recently hospitalized last month and found to have small R hydropneumothorax of unclear etiology. Plans previous admission for drainage of effusion and sampling of fluid and analysis, but patient refused at that time, as he watned to go to his son's wedding. He did not present for outpt visit. CT chest with increase in R pl effusion. S/p R thoracentesis 11/8 with exudative effusion by Light's Criteria.

## 2019-11-12 NOTE — PROGRESS NOTE ADULT - SUBJECTIVE AND OBJECTIVE BOX
Chief Complaint:  hyperglycemia in patient with DM2    History: Patient seen and examined at bedside with wife present. Patient reports he is tolerating diet, no nausea or vomiting, No diarrhea or constipation. Patient does not report fever, chills and reports SOB has improved.     MEDICATIONS  (STANDING):  aspirin enteric coated 81 milliGRAM(s) Oral daily  buMETAnide 2 milliGRAM(s) Oral daily  dextrose 5%. 1000 milliLiter(s) (50 mL/Hr) IV Continuous <Continuous>  dextrose 50% Injectable 12.5 Gram(s) IV Push once  dextrose 50% Injectable 25 Gram(s) IV Push once  dextrose 50% Injectable 25 Gram(s) IV Push once  influenza   Vaccine 0.5 milliLiter(s) IntraMuscular once  insulin glargine Injectable (LANTUS) 65 Unit(s) SubCutaneous at bedtime  insulin lispro (HumaLOG) corrective regimen sliding scale   SubCutaneous at bedtime  insulin lispro (HumaLOG) corrective regimen sliding scale   SubCutaneous three times a day before meals  insulin lispro Injectable (HumaLOG) 22 Unit(s) SubCutaneous before dinner  insulin lispro Injectable (HumaLOG) 10 Unit(s) SubCutaneous before lunch  insulin lispro Injectable (HumaLOG) 26 Unit(s) SubCutaneous before breakfast  metoprolol succinate  milliGRAM(s) Oral daily  pregabalin 100 milliGRAM(s) Oral two times a day    MEDICATIONS  (PRN):  acetaminophen   Tablet .. 650 milliGRAM(s) Oral every 4 hours PRN Moderate Pain (4 - 6)  dextrose 40% Gel 15 Gram(s) Oral once PRN Blood Glucose LESS THAN 70 milliGRAM(s)/deciliter  glucagon  Injectable 1 milliGRAM(s) IntraMuscular once PRN Glucose LESS THAN 70 milligrams/deciliter  oxyCODONE    IR 5 milliGRAM(s) Oral two times a day PRN Severe Pain (7 - 10)  sodium chloride 0.65% Nasal 1 Spray(s) Both Nostrils three times a day PRN Nasal Congestion    PHYSICAL EXAM:  VITALS: T(C): 36.6 (11-12-19 @ 04:42)  T(F): 97.9 (11-12-19 @ 04:42), Max: 99.1 (11-12-19 @ 00:23)  HR: 91 (11-12-19 @ 14:55) (67 - 101)  BP: 100/57 (11-12-19 @ 14:55) (100/57 - 166/82)  RR:  (18 - 18)  SpO2:  (95% - 97%)  Wt(kg): 132kg   GENERAL: NAD, well-groomed, obese elderly male   RESPIRATORY: Clear to auscultation bilaterally; + right pigtail, on supplemental oxygen   CARDIOVASCULAR: Regular rate and rhythm; No murmurs  PSYCH: Alert and oriented x 3, normal affect, reactive affect       POCT Blood Glucose.: 240 mg/dL (11-12-19 @ 12:00) H10+4  POCT Blood Glucose.: 220 mg/dL (11-12-19 @ 07:49) H 0+ 4  POCT Blood Glucose.: 177 mg/dL (11-11-19 @ 21:13)  L65  POCT Blood Glucose.: 122 mg/dL (11-11-19 @ 16:32) H22  POCT Blood Glucose.: 261 mg/dL (11-11-19 @ 12:16) H 10+6  POCT Blood Glucose.: 219 mg/dL (11-11-19 @ 07:49) H 24+4  POCT Blood Glucose.: 174 mg/dL (11-10-19 @ 21:15)  POCT Blood Glucose.: 177 mg/dL (11-10-19 @ 16:31)  POCT Blood Glucose.: 301 mg/dL (11-10-19 @ 11:31)  POCT Blood Glucose.: 247 mg/dL (11-10-19 @ 07:58)  POCT Blood Glucose.: 268 mg/dL (11-09-19 @ 23:02)  POCT Blood Glucose.: 160 mg/dL (11-09-19 @ 17:02)      11-12    136  |  87<L>  |  55<H>  ----------------------------<  253<H>  3.4<L>   |  34<H>  |  1.78<H>    EGFR if : 46<L>  EGFR if non : 40<L>    Ca    9.5      11-12    TPro  7.2  /  Alb  3.2<L>  /  TBili  0.5  /  DBili  x   /  AST  13  /  ALT  6<L>  /  AlkPhos  89  11-11      Hemoglobin A1C, Whole Blood: 11.7 % <H> [4.0 - 5.6] (11-07-19 @ 09:14)  Hemoglobin A1C, Whole Blood: 10.3 % <H> [4.0 - 5.6] (10-17-19 @ 07:43)

## 2019-11-12 NOTE — PROGRESS NOTE ADULT - SUBJECTIVE AND OBJECTIVE BOX
Requested Prescriptions   Pending Prescriptions Disp Refills     cyclobenzaprine (FLEXERIL) 10 MG tablet [Pharmacy Med Name: CYCLOBENZAPRINE HCL 10MG TABS]  Last Written Prescription Date:  08/05/19  Last Fill Quantity: 42,  # refills: 1   Last office visit: 7/22/2019 with prescribing provider:  ALEXIS Leiva   Future Office Visit:     42 tablet 1     Sig: TAKE ONE TABLET BY MOUTH THREE TIMES A DAY AS NEEDED FOR MUSCLE SPASMS       There is no refill protocol information for this order           Follow-up Pulm Progress Note    S/p R pigtail placement, 1L serosanguinous/bloody fluid drained  Sats 97% 2LNC  Denies SOB/CP    Medications:  MEDICATIONS  (STANDING):  aspirin enteric coated 81 milliGRAM(s) Oral daily  buMETAnide 2 milliGRAM(s) Oral daily  dextrose 5%. 1000 milliLiter(s) (50 mL/Hr) IV Continuous <Continuous>  dextrose 50% Injectable 12.5 Gram(s) IV Push once  dextrose 50% Injectable 25 Gram(s) IV Push once  dextrose 50% Injectable 25 Gram(s) IV Push once  influenza   Vaccine 0.5 milliLiter(s) IntraMuscular once  insulin glargine Injectable (LANTUS) 65 Unit(s) SubCutaneous at bedtime  insulin lispro (HumaLOG) corrective regimen sliding scale   SubCutaneous at bedtime  insulin lispro (HumaLOG) corrective regimen sliding scale   SubCutaneous three times a day before meals  insulin lispro Injectable (HumaLOG) 22 Unit(s) SubCutaneous before dinner  insulin lispro Injectable (HumaLOG) 10 Unit(s) SubCutaneous before lunch  insulin lispro Injectable (HumaLOG) 26 Unit(s) SubCutaneous before breakfast  metoprolol succinate  milliGRAM(s) Oral daily  pregabalin 100 milliGRAM(s) Oral two times a day    MEDICATIONS  (PRN):  acetaminophen   Tablet .. 650 milliGRAM(s) Oral every 4 hours PRN Moderate Pain (4 - 6)  dextrose 40% Gel 15 Gram(s) Oral once PRN Blood Glucose LESS THAN 70 milliGRAM(s)/deciliter  glucagon  Injectable 1 milliGRAM(s) IntraMuscular once PRN Glucose LESS THAN 70 milligrams/deciliter  oxyCODONE    IR 5 milliGRAM(s) Oral two times a day PRN Severe Pain (7 - 10)  sodium chloride 0.65% Nasal 1 Spray(s) Both Nostrils three times a day PRN Nasal Congestion          Vital Signs Last 24 Hrs  T(C): 36.6 (12 Nov 2019 04:42), Max: 37.3 (12 Nov 2019 00:23)  T(F): 97.9 (12 Nov 2019 04:42), Max: 99.1 (12 Nov 2019 00:23)  HR: 68 (12 Nov 2019 12:16) (68 - 101)  BP: 166/82 (12 Nov 2019 12:16) (118/73 - 166/82)  BP(mean): --  RR: 18 (12 Nov 2019 12:16) (18 - 19)  SpO2: 96% (12 Nov 2019 12:16) (94% - 97%)          11-11 @ 07:01  -  11-12 @ 07:00  --------------------------------------------------------  IN: 240 mL / OUT: 1650 mL / NET: -1410 mL          LABS:                        15.4   9.10  )-----------( 249      ( 12 Nov 2019 09:04 )             51.2     11-12    136  |  87<L>  |  55<H>  ----------------------------<  253<H>  3.4<L>   |  34<H>  |  1.78<H>    Ca    9.5      12 Nov 2019 06:22    TPro  7.2  /  Alb  3.2<L>  /  TBili  0.5  /  DBili  x   /  AST  13  /  ALT  6<L>  /  AlkPhos  89  11-11          CAPILLARY BLOOD GLUCOSE      POCT Blood Glucose.: 240 mg/dL (12 Nov 2019 12:00)    PT/INR - ( 12 Nov 2019 09:28 )   PT: 12.5 sec;   INR: 1.10 ratio         PTT - ( 12 Nov 2019 09:28 )  PTT:32.3 sec        Fluid characteristics  PLEURAL 11-08 @ 18:05  pH 7.74    tprot 3.9    Cell count  Appearance Sl Bloody  Fluid type --  BF lymph 77  color Red  eosinophil 2  PMN 11  Mesothelial 2  Monocyte 8  Other body cells --      CULTURES:       Culture - Body Fluid with Gram Stain (collected 11-08-19 @ 22:02)  Source: .Body Fluid Pleural Fluid  Gram Stain (11-08-19 @ 23:14):    polymorphonuclear leukocytes seen    No organisms seen    by cytocentrifuge  Preliminary Report (11-09-19 @ 17:35):    No growth          Physical Examination:  PULM: diminished BS R base  CVS: irreg irreg    RADIOLOGY REVIEWED  CT chest:   FINDINGS:    LUNGS AND AIRWAYS: The right lower lobe passive atelectasis of the right   lobe adjacent to the pleural effusion has decreased. Atelectasis adjacent   to the effusion in the right middle lobe is unchanged. Left lower lobe   linear atelectasis is unchanged. Bilateral calcified pulmonary nodules.    PLEURA: Moderate right-sided now loculated pleural effusion, intervally   decreased in size since 11/5/2019.    MEDIASTINUM AND STEPHON: No enlarged chest lymph nodes. The visualized   thyroid gland is unremarkable. The esophagus is unremarkable.    VESSELS: The aorta is normal in caliber. Atheromatousdisease of the   aorta. The pulmonary artery is enlarged which can suggest pulmonary   arterial hypertension.    HEART: Heart size is normal. No pericardial effusion. Coronary artery   calcifications. Aortic valve calcifications.    CHEST WALL AND LOWER NECK: Within normal limits.    VISUALIZED UPPER ABDOMEN: Unremarkable.    BONES: Degenerative changes of the spine.    IMPRESSION:   1.  Intervally decreased right-sided moderate-sized pleural effusion   status post thoracentesis.  2.  No pneumothorax.

## 2019-11-12 NOTE — PROGRESS NOTE ADULT - PROBLEM SELECTOR PLAN 1
uncontrolled DM2 c/b neuropathy, CKD3, macrovascular disease (CAD and CVA)  A1c 11.7%, goal A1c ~7  FS goal 100-180, above goal however improved   FS premeal and qhs  carb consistent diet  Recommend increased to Lantus 70 units qhs, as patient am fasting remains above goal  Continue Humalog 26/10/22 units premeal (hold if NPO)   Recommend mod premeal and qhs sliding scale   Patient to follow up with Endocrinologist, Dr Real, on discharge: 1/3/20 at 215pm  Continue Basal/bolus, dose to be determined  Patient to follow up with  ophthalmology as outpt uncontrolled DM2 c/b neuropathy, CKD3, macrovascular disease (CAD and CVA)  A1c 11.7%, goal A1c ~7  FS goal 100-180, above goal however improved   FS premeal and qhs  carb consistent diet  Recommend switching Lantus to 40units BID dosing, starting from tonight as patient  FS remain above goal  Continue Humalog 26/10/22 units premeal (hold if NPO)   Recommend mod premeal and qhs sliding scale   Patient to follow up with Endocrinologist, Dr Real, on discharge: 1/3/20 at 215pm  Continue Basal/bolus, dose to be determined  Patient to follow up with ophthalmology as outpt

## 2019-11-12 NOTE — PROGRESS NOTE ADULT - SUBJECTIVE AND OBJECTIVE BOX
CC: no new complaints, s/p pigtail, 1/1 liters of fluid drained    TELEMETRY:     PHYSICAL EXAM:    T(C): 36.6 (11-12-19 @ 04:42), Max: 37.3 (11-12-19 @ 00:23)  HR: 71 (11-12-19 @ 13:20) (67 - 101)  BP: 131/72 (11-12-19 @ 13:20) (118/73 - 166/82)  RR: 18 (11-12-19 @ 13:20) (18 - 19)  SpO2: 97% (11-12-19 @ 13:20) (94% - 97%)  Wt(kg): --  I&O's Summary    11 Nov 2019 07:01  -  12 Nov 2019 07:00  --------------------------------------------------------  IN: 240 mL / OUT: 1650 mL / NET: -1410 mL    12 Nov 2019 07:01  -  12 Nov 2019 13:51  --------------------------------------------------------  IN: 0 mL / OUT: 600 mL / NET: -600 mL        Appearance: Normal	  Cardiovascular: Normal S1 S2,RRR, No JVD, No murmurs  Respiratory: bibasilar crackles	  Gastrointestinal:  Soft, Non-tender, + BS	  Extremities: Normal range of motion, No clubbing, cyanosis or edema  Vascular: Peripheral pulses palpable 2+ bilaterally     LABS:	 	                          15.4   9.10  )-----------( 249      ( 12 Nov 2019 09:04 )             51.2     11-12    136  |  87<L>  |  55<H>  ----------------------------<  253<H>  3.4<L>   |  34<H>  |  1.78<H>    Ca    9.5      12 Nov 2019 06:22    TPro  7.2  /  Alb  3.2<L>  /  TBili  0.5  /  DBili  x   /  AST  13  /  ALT  6<L>  /  AlkPhos  89  11-11      PT/INR - ( 12 Nov 2019 09:28 )   PT: 12.5 sec;   INR: 1.10 ratio         PTT - ( 12 Nov 2019 09:28 )  PTT:32.3 sec    CARDIAC MARKERS:

## 2019-11-12 NOTE — PROGRESS NOTE ADULT - ASSESSMENT
65 yo M with history of uncontrolled DM2 c/b neuropathy, CKD3, macrovascular disease (CAD, TIA, left carotid artery stenosis), Hypertension, Hyperlipidemia, Atrial Fibrillation on Pradaxa, CHF presenting with SOB and cough secondary to right hydropneumothorax. Endocrine consult requested for management of hyperglycemia in patient with DM2 A1c >10% (high risk patient with high level decision-making). Overnight patient BG remain above goal,  122-240s.

## 2019-11-12 NOTE — PROGRESS NOTE ADULT - ASSESSMENT
65 y/o M with PMH of CAD s/p stents, HFrEF (EF 40% 12/2018), DMT2, HTN, CKD, NSTEMI, Afib (on Pradaxa). Presents to ED with worsening SOB and productive cough with .   pt  recently hospitalized last month and found to have small R hydropneumothorax of unclear etiology. Plans previous admission for drainage of effusion and sampling of fluid and analysis, but patient refused at that time and agreed for outpt f/u. presents now w/ sob       Problem: Hydropneumothorax.   CT from previous admission with R sided hydropneumothorax of unclear etiology.  -Pt refused drainage at that time.  s/p thorocentesis/c/w exudate  holding  pradaxa/  pig tail today        ·  Problem: CHF (congestive heart failure).  Recommendation: -Keep O>I as tolerated.   c/w current meds  cards eval noted    dm /uncontrolled  endo eval noted  fsg riss  c/w insulin regimen as per endo        dm neuropathy  c/w lyrica  oxycodone for severe pain     alex/ ckd  renal f/u   c/w recs as per renal

## 2019-11-12 NOTE — PROGRESS NOTE ADULT - SUBJECTIVE AND OBJECTIVE BOX
[facility]   [phone]  RENAL FOLLOW UP NOTE  --------------------------------------------------------------------------------  Chief Complaint:    24 hour events/subjective:    seen earlier. still with sob for IR drainage pl. effusion   c/o left knee pain     PAST HISTORY  --------------------------------------------------------------------------------  No significant changes to PMH, PSH, FHx, SHx, unless otherwise noted    ALLERGIES & MEDICATIONS  --------------------------------------------------------------------------------  Allergies    No Known Allergies    Intolerances      Standing Inpatient Medications  aspirin enteric coated 81 milliGRAM(s) Oral daily  buMETAnide 2 milliGRAM(s) Oral daily  dextrose 5%. 1000 milliLiter(s) IV Continuous <Continuous>  dextrose 50% Injectable 12.5 Gram(s) IV Push once  dextrose 50% Injectable 25 Gram(s) IV Push once  dextrose 50% Injectable 25 Gram(s) IV Push once  influenza   Vaccine 0.5 milliLiter(s) IntraMuscular once  insulin glargine Injectable (LANTUS) 40 Unit(s) SubCutaneous at bedtime  insulin glargine Injectable (LANTUS) 40 Unit(s) SubCutaneous every morning  insulin lispro (HumaLOG) corrective regimen sliding scale   SubCutaneous at bedtime  insulin lispro (HumaLOG) corrective regimen sliding scale   SubCutaneous three times a day before meals  insulin lispro Injectable (HumaLOG) 22 Unit(s) SubCutaneous before dinner  insulin lispro Injectable (HumaLOG) 10 Unit(s) SubCutaneous before lunch  insulin lispro Injectable (HumaLOG) 26 Unit(s) SubCutaneous before breakfast  metoprolol succinate  milliGRAM(s) Oral daily  pregabalin 100 milliGRAM(s) Oral two times a day    PRN Inpatient Medications  acetaminophen   Tablet .. 650 milliGRAM(s) Oral every 4 hours PRN  dextrose 40% Gel 15 Gram(s) Oral once PRN  glucagon  Injectable 1 milliGRAM(s) IntraMuscular once PRN  oxyCODONE    IR 5 milliGRAM(s) Oral two times a day PRN  sodium chloride 0.65% Nasal 1 Spray(s) Both Nostrils three times a day PRN      REVIEW OF SYSTEMS  --------------------------------------------------------------------------------    Gen: denies  fevers/chills,  CVS: denies chest pain/palpitations  Resp: denies worsening  SOB/Cough  GI: Denies N/V/Abd pain  : Denies dysuria/oliguria/hematuria    All other systems were reviewed and are negative, except as noted.    VITALS/PHYSICAL EXAM  --------------------------------------------------------------------------------  T(C): 37 (11-12-19 @ 20:49), Max: 37.3 (11-12-19 @ 00:23)  HR: 85 (11-12-19 @ 20:49) (67 - 101)  BP: 144/79 (11-12-19 @ 20:49) (100/57 - 166/82)  RR: 18 (11-12-19 @ 20:49) (18 - 18)  SpO2: 96% (11-12-19 @ 20:49) (94% - 97%)  Wt(kg): --        11-11-19 @ 07:01  -  11-12-19 @ 07:00  --------------------------------------------------------  IN: 240 mL / OUT: 1650 mL / NET: -1410 mL    11-12-19 @ 07:01  -  11-12-19 @ 22:39  --------------------------------------------------------  IN: 0 mL / OUT: 1090 mL / NET: -1090 mL      Physical Exam:  	    Gen:  + obese appears with mildly tachypneic  on O2    	no jvd   	Pulm: decrease bs  R base upto 3/4  no rales or ronchi or wheezing  	CV: RRR, S1S2; no rub  	Abd: +BS, soft, nontender/nondistended  	: No suprapubic tenderness  	UE: Warm, no cyanosis  no clubbing,  no edema  	LE: Warm, no cyanosis  no clubbing, no edema  left knee no erythema or induration       LABS/STUDIES  --------------------------------------------------------------------------------              15.4   9.10  >-----------<  249      [11-12-19 @ 09:04]              51.2     136  |  87  |  55  ----------------------------<  253      [11-12-19 @ 06:22]  3.4   |  34  |  1.78        Ca     9.5     [11-12-19 @ 06:22]    TPro  7.2  /  Alb  3.2  /  TBili  0.5  /  DBili  x   /  AST  13  /  ALT  6   /  AlkPhos  89  [11-11-19 @ 11:47]    PT/INR: PT 12.5 , INR 1.10       [11-12-19 @ 09:28]  PTT: 32.3       [11-12-19 @ 09:28]      Creatinine Trend:  SCr 1.78 [11-12 @ 06:22]  SCr 1.74 [11-11 @ 11:47]  SCr 1.98 [11-10 @ 06:10]  SCr 1.98 [11-09 @ 06:39]  SCr 2.01 [11-08 @ 06:24]              Urinalysis - [10-17-19 @ 04:40]      Color Light Yellow / Appearance Clear / SG 1.015 / pH 6.5      Gluc 500 mg/dL / Ketone Negative  / Bili Negative / Urobili Negative       Blood Small / Protein 300 mg/dL / Leuk Est Negative / Nitrite Negative      RBC 8 / WBC 3 / Hyaline 0 / Gran  / Sq Epi  / Non Sq Epi 0 / Bacteria Negative    Urine Creatinine 76      [11-06-19 @ 08:10]  Urine Protein 134      [11-06-19 @ 08:10]    HbA1c 11.7      [11-07-19 @ 09:14]  TSH 1.71      [12-28-18 @ 14:58]

## 2019-11-12 NOTE — PROGRESS NOTE ADULT - ASSESSMENT
64 year old male with CKD Hypertension, Hyperlipidemia, Atrial Fibrillation, s/p DCCV, Coronary Artery Disease, NSTEMI, (2/2014), s/p PCI to mid LAD, Proximal LCx, Distal LCx (2/14, [facility], [facility]), Congestive Heart Failure,  Diabetes Mellitus, CVA, Severe Left Internal Carotid Disease, s/p CEA (2/2014) recently admitted for hydropneumothorax, a drainage of effusion and sampling of fluid and analysis was suggested however patient refused at that time.  Pt. presents  today with worsening SOB and cough with worsening pl. effusion         1- ckd III   2- proteinuria   3- chf   4- HTN   5- pleural effusion  s/p thoracentesis  6- a fib    creatinine slightly better   will need arb in near future for proteinuria   bumex 2 mg po qd   in am resume metolazone 2.5 mg qd   hypokalemia supplement kcl   cont toprol xl 100 mg qd  IR drainage of pl. effusion

## 2019-11-12 NOTE — PROGRESS NOTE ADULT - ASSESSMENT
64 year old male with Hypertension, Hyperlipidemia, Atrial Fibrillation, s/p DCCV, Coronary Artery Disease, NSTEMI, (2/2014), s/p PCI to mid LAD, Proximal LCx, Distal LCx (2/14, [facility], [facility]), Congestive Heart Failure, Normal Ejection Fraction, Diabetes Mellitus, CVA, Severe Left Internal Carotid Disease, s/p CEA (2/2014) presenting with worsening SOB, cough.     1.Right-sided Pleural effusion, Hydropneumothorax  -s/p right pleural pigtail catheter, cont drainage-serosanguinous fluid   -s/p right thoracentesis 11/8/0219, bloody exudate  -f/u studies, cytology  -pulmo/thoracic f/u for further management;  plan for R pigtail placement tomorrow    2. Chronic Congestive heart failure, Diastolic.   -stable, minimal LE edema, no evidence of ADHF on exam   -continue current diuresis regimen, cont bumex  resume metol to Q 48  -recent echo with no evidence of pericardial effusion, mod LAE, grossly borderline LV sys dysfx EF 45% likely underestimated due to AF     3. Atrial Fibrillation, chronic   -stable, c/w metoprolol succinate ER 100mg daily  -a/c on hold per pulm . plan for R pigtail placement tomorrow    4. Coronary Artery Disease. S/P PCI to LAD, LCx.  -stable, Continue ASA 81mg and statin    dvt ppx

## 2019-11-13 LAB
ANION GAP SERPL CALC-SCNC: 16 MMOL/L — SIGNIFICANT CHANGE UP (ref 5–17)
BUN SERPL-MCNC: 55 MG/DL — HIGH (ref 7–23)
CALCIUM SERPL-MCNC: 9.1 MG/DL — SIGNIFICANT CHANGE UP (ref 8.4–10.5)
CHLORIDE SERPL-SCNC: 89 MMOL/L — LOW (ref 96–108)
CO2 SERPL-SCNC: 32 MMOL/L — HIGH (ref 22–31)
CREAT SERPL-MCNC: 1.79 MG/DL — HIGH (ref 0.5–1.3)
CULTURE RESULTS: SIGNIFICANT CHANGE UP
GLUCOSE BLDC GLUCOMTR-MCNC: 158 MG/DL — HIGH (ref 70–99)
GLUCOSE BLDC GLUCOMTR-MCNC: 158 MG/DL — HIGH (ref 70–99)
GLUCOSE BLDC GLUCOMTR-MCNC: 192 MG/DL — HIGH (ref 70–99)
GLUCOSE BLDC GLUCOMTR-MCNC: 206 MG/DL — HIGH (ref 70–99)
GLUCOSE SERPL-MCNC: 209 MG/DL — HIGH (ref 70–99)
HCT VFR BLD CALC: 48.5 % — SIGNIFICANT CHANGE UP (ref 39–50)
HGB BLD-MCNC: 14.7 G/DL — SIGNIFICANT CHANGE UP (ref 13–17)
MCHC RBC-ENTMCNC: 25.4 PG — LOW (ref 27–34)
MCHC RBC-ENTMCNC: 30.3 GM/DL — LOW (ref 32–36)
MCV RBC AUTO: 83.8 FL — SIGNIFICANT CHANGE UP (ref 80–100)
PLATELET # BLD AUTO: 254 K/UL — SIGNIFICANT CHANGE UP (ref 150–400)
POTASSIUM SERPL-MCNC: 3.7 MMOL/L — SIGNIFICANT CHANGE UP (ref 3.5–5.3)
POTASSIUM SERPL-SCNC: 3.7 MMOL/L — SIGNIFICANT CHANGE UP (ref 3.5–5.3)
RBC # BLD: 5.79 M/UL — SIGNIFICANT CHANGE UP (ref 4.2–5.8)
RBC # FLD: 15.7 % — HIGH (ref 10.3–14.5)
SODIUM SERPL-SCNC: 137 MMOL/L — SIGNIFICANT CHANGE UP (ref 135–145)
SPECIMEN SOURCE: SIGNIFICANT CHANGE UP
URATE SERPL-MCNC: 11 MG/DL — HIGH (ref 3.4–8.8)
WBC # BLD: 13.61 K/UL — HIGH (ref 3.8–10.5)
WBC # FLD AUTO: 13.61 K/UL — HIGH (ref 3.8–10.5)

## 2019-11-13 PROCEDURE — 99232 SBSQ HOSP IP/OBS MODERATE 35: CPT

## 2019-11-13 PROCEDURE — 71045 X-RAY EXAM CHEST 1 VIEW: CPT | Mod: 26

## 2019-11-13 RX ORDER — DILTIAZEM HCL 120 MG
30 CAPSULE, EXT RELEASE 24 HR ORAL THREE TIMES A DAY
Refills: 0 | Status: DISCONTINUED | OUTPATIENT
Start: 2019-11-13 | End: 2019-11-17

## 2019-11-13 RX ORDER — METOPROLOL TARTRATE 50 MG
5 TABLET ORAL ONCE
Refills: 0 | Status: COMPLETED | OUTPATIENT
Start: 2019-11-13 | End: 2019-11-13

## 2019-11-13 RX ORDER — COLCHICINE 0.6 MG
1.2 TABLET ORAL ONCE
Refills: 0 | Status: DISCONTINUED | OUTPATIENT
Start: 2019-11-14 | End: 2019-11-14

## 2019-11-13 RX ORDER — COLCHICINE 0.6 MG
0.6 TABLET ORAL ONCE
Refills: 0 | Status: COMPLETED | OUTPATIENT
Start: 2019-11-13 | End: 2019-11-13

## 2019-11-13 RX ADMIN — Medication 100 MILLIGRAM(S): at 05:14

## 2019-11-13 RX ADMIN — INSULIN GLARGINE 40 UNIT(S): 100 INJECTION, SOLUTION SUBCUTANEOUS at 09:14

## 2019-11-13 RX ADMIN — Medication 0.6 MILLIGRAM(S): at 21:03

## 2019-11-13 RX ADMIN — OXYCODONE HYDROCHLORIDE 5 MILLIGRAM(S): 5 TABLET ORAL at 21:03

## 2019-11-13 RX ADMIN — Medication 650 MILLIGRAM(S): at 05:15

## 2019-11-13 RX ADMIN — OXYCODONE HYDROCHLORIDE 5 MILLIGRAM(S): 5 TABLET ORAL at 07:09

## 2019-11-13 RX ADMIN — Medication 26 UNIT(S): at 08:02

## 2019-11-13 RX ADMIN — Medication 100 MILLIGRAM(S): at 17:10

## 2019-11-13 RX ADMIN — Medication 30 MILLIGRAM(S): at 12:09

## 2019-11-13 RX ADMIN — OXYCODONE HYDROCHLORIDE 5 MILLIGRAM(S): 5 TABLET ORAL at 07:39

## 2019-11-13 RX ADMIN — Medication 2: at 17:10

## 2019-11-13 RX ADMIN — Medication 650 MILLIGRAM(S): at 05:45

## 2019-11-13 RX ADMIN — Medication 5 MILLIGRAM(S): at 09:08

## 2019-11-13 RX ADMIN — Medication 22 UNIT(S): at 17:09

## 2019-11-13 RX ADMIN — Medication 10 UNIT(S): at 12:10

## 2019-11-13 RX ADMIN — OXYCODONE HYDROCHLORIDE 5 MILLIGRAM(S): 5 TABLET ORAL at 21:45

## 2019-11-13 RX ADMIN — Medication 2: at 12:10

## 2019-11-13 RX ADMIN — Medication 81 MILLIGRAM(S): at 12:09

## 2019-11-13 RX ADMIN — INSULIN GLARGINE 40 UNIT(S): 100 INJECTION, SOLUTION SUBCUTANEOUS at 21:13

## 2019-11-13 RX ADMIN — BUMETANIDE 2 MILLIGRAM(S): 0.25 INJECTION INTRAMUSCULAR; INTRAVENOUS at 05:14

## 2019-11-13 RX ADMIN — Medication 30 MILLIGRAM(S): at 21:03

## 2019-11-13 RX ADMIN — Medication 4: at 08:03

## 2019-11-13 NOTE — PROGRESS NOTE ADULT - PROBLEM SELECTOR PLAN 1
-Previously noted hydropneumothorax on CT from 10/16, not present on current CT  -S/p R thoracentesis 11/8, 490cc bloody fluid drained. Effusion exudative by [facility] Criteria.   -Cyto negative for malignant cells (report not visible in [place], copy in pt's chart)  -CT chest with decrease in R effusion. Appearance of trapped lung visible from CT in october not visible, lung appeared to re-expanded.  -S/p R pigtail placement today in IR. 100cc serosanguinous fluid drained overnight. Keep pigtail to LWS. Plan to d/c pigtail once output <100cc/24hrs.  -CXR ordered  -Keep sats >92% with supplemental O2 as needed. \

## 2019-11-13 NOTE — PROGRESS NOTE ADULT - ASSESSMENT
64 year old male with CKD Hypertension, Hyperlipidemia, Atrial Fibrillation, s/p DCCV, Coronary Artery Disease, NSTEMI, (2/2014), s/p PCI to mid LAD, Proximal LCx, Distal LCx (2/14, [facility], [facility]), Congestive Heart Failure,  Diabetes Mellitus, CVA, Severe Left Internal Carotid Disease, s/p CEA (2/2014) recently admitted for hydropneumothorax, a drainage of effusion and sampling of fluid and analysis was suggested however patient refused at that time.  Pt. presents  today with worsening SOB and cough with worsening pl. effusion         1- ckd III   2- proteinuria   3- chf   4- HTN   5- pleural effusion  s/p thoracentesis  6- a fib    creatinine slightly better   will need arb in near future for proteinuria   bumex 2 mg po qd   resume metolazone 2.5 mg qd   cont toprol xl 100 mg qd  hyperuricemia and left knee pain likely acute gout attack vs. meniscus injury. colcrys 0.6 mg po x1 and repeat in am then monitor response as acute gout suspected

## 2019-11-13 NOTE — PROGRESS NOTE ADULT - SUBJECTIVE AND OBJECTIVE BOX
Diabetes Follow up note:    Chief complaint:  f/u T2DM    Interval Hx: Pt seen at bedside with NP Student. Pt remains with elevated BG levels despite higher insulin doses than home regimen. Pt reports frustration r/t high sugar levels as he is only eating what comes on food tray, has stopped eating fruit from home as snack at bedtime in attempts to prevent hyperglycemia. Pt also reporting that he has been provided more starches/carbs on hospital diet while inpatient compared to at home. Tolerating POs. Denies feeling symptoms of low sugars overnight.     Review of Systems:  General: denies acute distress  GI: Tolerating POs. Denies N/V/D/Abd pain  CV: Denies CP/SOB  ENDO: Denies S&Sx of hypoglycemia  MEDS:    insulin glargine Injectable (LANTUS) 40 Unit(s) SubCutaneous at bedtime  insulin glargine Injectable (LANTUS) 40 Unit(s) SubCutaneous every morning  insulin lispro (HumaLOG) corrective regimen sliding scale   SubCutaneous at bedtime  insulin lispro (HumaLOG) corrective regimen sliding scale   SubCutaneous three times a day before meals  insulin lispro Injectable (HumaLOG) 22 Unit(s) SubCutaneous before dinner  insulin lispro Injectable (HumaLOG) 10 Unit(s) SubCutaneous before lunch  insulin lispro Injectable (HumaLOG) 26 Unit(s) SubCutaneous before breakfast      Allergies    No Known Allergies    PE:   General: male sitting at edge of bed  Vital Signs Last 24 Hrs  T(C): 36.9 (13 Nov 2019 04:37), Max: 37.2 (13 Nov 2019 00:11)  T(F): 98.4 (13 Nov 2019 04:37), Max: 99 (13 Nov 2019 00:11)  HR: 96 (13 Nov 2019 04:37) (67 - 98)  BP: 119/84 (13 Nov 2019 04:37) (100/57 - 166/82)  BP(mean): --  RR: 18 (13 Nov 2019 04:37) (18 - 18)  SpO2: 97% (13 Nov 2019 04:37) (94% - 97%)  HEENT: nasal cannula in place   Chest/Back: R upper back pigtail chest tube in place   Abd: Soft, NT, ND   Extremities: Warm, B/L LE edema , chronic venous changes   Neuro: A&O X3    LABS:  POCT Blood Glucose.: 206 mg/dL (11-13-19 @ 07:43)  POCT Blood Glucose.: 264 mg/dL (11-12-19 @ 21:41)  POCT Blood Glucose.: 282 mg/dL (11-12-19 @ 16:43)  POCT Blood Glucose.: 240 mg/dL (11-12-19 @ 12:00)  POCT Blood Glucose.: 220 mg/dL (11-12-19 @ 07:49)  POCT Blood Glucose.: 177 mg/dL (11-11-19 @ 21:13)  POCT Blood Glucose.: 122 mg/dL (11-11-19 @ 16:32)  POCT Blood Glucose.: 261 mg/dL (11-11-19 @ 12:16)  POCT Blood Glucose.: 219 mg/dL (11-11-19 @ 07:49)  POCT Blood Glucose.: 174 mg/dL (11-10-19 @ 21:15)  POCT Blood Glucose.: 177 mg/dL (11-10-19 @ 16:31)  POCT Blood Glucose.: 301 mg/dL (11-10-19 @ 11:31)                            14.7   13.61 )-----------( 254      ( 13 Nov 2019 09:25 )             48.5       11-13    137  |  89<L>  |  55<H>  ----------------------------<  209<H>  3.7   |  32<H>  |  1.79<H>    Ca    9.1      13 Nov 2019 06:43    TPro  7.2  /  Alb  3.2<L>  /  TBili  0.5  /  DBili  x   /  AST  13  /  ALT  6<L>  /  AlkPhos  89  11-11      Hemoglobin A1C, Whole Blood: 11.7 % <H> [4.0 - 5.6] (11-07-19 @ 09:14)  Hemoglobin A1C, Whole Blood: 10.3 % <H> [4.0 - 5.6] (10-17-19 @ 07:43)          Contact number: joey [phone] or [phone]

## 2019-11-13 NOTE — CONSULT NOTE ADULT - SUBJECTIVE AND OBJECTIVE BOX
HISTORY OF PRESENT ILLNESS:  63 yo male with multiple medical issues as listed.  He was admitted due to SOB after a recent procedure relating to hydropneumothorax.  He is s/p recent thoracentesis.  While in hospital he developed acute pain in the L knee.  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    PAST MEDICAL & SURGICAL HISTORY:  Neuropathy  CAD (coronary artery disease)  MI (myocardial infarction): circa   Atrial fibrillation  TIA (transient ischemic attack)  DM type 2 (diabetes mellitus, type 2)  HLD (hyperlipidemia)  HTN (hypertension), benign  S/P carotid endarterectomy: left  Status post angioplasty with stent: LULU x 3 2014        MEDICATIONS  (STANDING):  aspirin enteric coated 81 milliGRAM(s) Oral daily  buMETAnide 2 milliGRAM(s) Oral daily  dextrose 5%. 1000 milliLiter(s) (50 mL/Hr) IV Continuous <Continuous>  dextrose 50% Injectable 12.5 Gram(s) IV Push once  dextrose 50% Injectable 25 Gram(s) IV Push once  dextrose 50% Injectable 25 Gram(s) IV Push once  diltiazem    Tablet 30 milliGRAM(s) Oral three times a day  influenza   Vaccine 0.5 milliLiter(s) IntraMuscular once  insulin glargine Injectable (LANTUS) 40 Unit(s) SubCutaneous at bedtime  insulin glargine Injectable (LANTUS) 40 Unit(s) SubCutaneous every morning  insulin lispro (HumaLOG) corrective regimen sliding scale   SubCutaneous at bedtime  insulin lispro (HumaLOG) corrective regimen sliding scale   SubCutaneous three times a day before meals  insulin lispro Injectable (HumaLOG) 22 Unit(s) SubCutaneous before dinner  insulin lispro Injectable (HumaLOG) 10 Unit(s) SubCutaneous before lunch  insulin lispro Injectable (HumaLOG) 26 Unit(s) SubCutaneous before breakfast  metoprolol succinate  milliGRAM(s) Oral daily  pregabalin 100 milliGRAM(s) Oral two times a day    MEDICATIONS  (PRN):  acetaminophen   Tablet .. 650 milliGRAM(s) Oral every 4 hours PRN Moderate Pain (4 - 6)  dextrose 40% Gel 15 Gram(s) Oral once PRN Blood Glucose LESS THAN 70 milliGRAM(s)/deciliter  glucagon  Injectable 1 milliGRAM(s) IntraMuscular once PRN Glucose LESS THAN 70 milligrams/deciliter  oxyCODONE    IR 5 milliGRAM(s) Oral two times a day PRN Severe Pain (7 - 10)  sodium chloride 0.65% Nasal 1 Spray(s) Both Nostrils three times a day PRN Nasal Congestion      Allergies    No Known Allergies    Intolerances        PERTINENT MEDICATION HISTORY:    SOCIAL HISTORY:    FAMILY HISTORY:  Family history of heart disease      Vital Signs Last 24 Hrs  T(C): 37 (2019 20:10), Max: 37.2 (2019 00:11)  T(F): 98.6 (2019 20:10), Max: 99 (2019 00:11)  HR: 94 (2019 20:10) (94 - 105)  BP: 106/65 (2019 20:10) (100/60 - 133/79)  BP(mean): --  RR: 19 (2019 20:10) (17 - 19)  SpO2: 93% (2019 20:10) (91% - 97%)    Daily     Daily Weight in k.7 (2019 15:54)    PHYSICAL EXAM:      Constitutional:    Eyes:  EOMI        Neck:  supple      Respiratory:  Chest/pleural drainage catheter in place    Extremities:  Chronic stasis changes      Skin:    Lymph Nodes:    Musculoskeletal:  L knee; very painful ROM, small effusion  R knee; large prepatellar nodule ?tophus, good ROM  Hands; B PIP tophi, flexion contractures, +tenderness of 4th PIP    Psychiatric:        LABS:             Uric: 11.0               14.7   13.61 )-----------( 254      ( 2019 09:25 )             48.5     11-13    137  |  89<L>  |  55<H>  ----------------------------<  209<H>  3.7   |  32<H>  |  1.79<H>    Ca    9.1      2019 06:43      PT/INR - ( 2019 09:28 )   PT: 12.5 sec;   INR: 1.10 ratio         PTT - ( 2019 09:28 )  PTT:32.3 sec      RADIOLOGY & ADDITIONAL STUDIES:

## 2019-11-13 NOTE — DIETITIAN INITIAL EVALUATION ADULT. - ADD RECOMMEND
1. Recommend change diet to DASH/TLC + Consistent Carbohydrate (with evening snacks) to promote heart health and BG control. Renal restrictions not indicated at this time. 2. Provide/review nutrition education as indicated 3. Promote adequate BG control, recommend adjust insulin as needed. 4. Recommend consider daily weights

## 2019-11-13 NOTE — CHART NOTE - NSCHARTNOTEFT_GEN_A_CORE
Contacted by RN for telemetry event.   Patient with hx afib admitted for right pleural effusion. Tele monitor showing afib sustatining in 130s-140s  Patient seen and examined at bedside. Sitting comfortably at edge of bed. He denies CP, palpitations, SOB, dizziness.     Vital Signs Last 24 Hrs  T(C): 36.9 (13 Nov 2019 04:37), Max: 37.2 (13 Nov 2019 00:11)  T(F): 98.4 (13 Nov 2019 04:37), Max: 99 (13 Nov 2019 00:11)  HR: 96 (13 Nov 2019 04:37) (67 - 98)  BP: 119/84 (13 Nov 2019 04:37) (100/57 - 166/82)  BP(mean): --  RR: 18 (13 Nov 2019 04:37) (18 - 18)  SpO2: 97% (13 Nov 2019 04:37) (94% - 97%)    Const: A+Ox3  Cardiovascular: Heart rate irregular, rapid.   Lungs: Normal work of breathing. Crackles right lower lung base. Improved since yesterday.     Continue beta blocker as directed.   Additional dose 5 mg IV lopressor given. Rate now low 100s.   Continue to monitor telemetry, vitals.   Notify provider immediately if patient becomes symptomatic.     Department of Medicine  Tere Sparrow PA-C  [id number]

## 2019-11-13 NOTE — DIETITIAN INITIAL EVALUATION ADULT. - PERTINENT LABORATORY DATA
11/13: POCT 158-206, BUN 55, creat 1.79, glucose 209, eGFR 39; 11/12: POCT 220-282; 11/7: HgbA1c 11.7%

## 2019-11-13 NOTE — PROGRESS NOTE ADULT - ASSESSMENT
65 y/o male with hx of uncontrolled T2DM c/b neuropathy, CKD3, macrovascular disease (CAD, TIA, left carotid artery stenosis), hypertension, hyperlipidemia, atrial Fibrillation on Pradaxa, CHF presenting with SOB and cough secondary to right hydropneumothorax. Endocrine consult requested for management of hyperglycemia in patient with DM2 A1c >10%. Pt requiring significantly higher amounts of insulin while inpatient.  Lantus switched to 40 units BID, received first dose this AM. Will need to monitor effect of BID Lantus before making any further changes. BG goal (100-180 mg/dl).

## 2019-11-13 NOTE — PROGRESS NOTE ADULT - SUBJECTIVE AND OBJECTIVE BOX
[facility]   [phone]  RENAL FOLLOW UP NOTE  --------------------------------------------------------------------------------  Chief Complaint:    24 hour events/subjective:    seen earlier in evening. states has left knee pain   sob is better     PAST HISTORY  --------------------------------------------------------------------------------  No significant changes to PMH, PSH, FHx, SHx, unless otherwise noted    ALLERGIES & MEDICATIONS  --------------------------------------------------------------------------------  Allergies    No Known Allergies    Intolerances      Standing Inpatient Medications  aspirin enteric coated 81 milliGRAM(s) Oral daily  buMETAnide 2 milliGRAM(s) Oral daily  dextrose 5%. 1000 milliLiter(s) IV Continuous <Continuous>  dextrose 50% Injectable 12.5 Gram(s) IV Push once  dextrose 50% Injectable 25 Gram(s) IV Push once  dextrose 50% Injectable 25 Gram(s) IV Push once  diltiazem    Tablet 30 milliGRAM(s) Oral three times a day  influenza   Vaccine 0.5 milliLiter(s) IntraMuscular once  insulin glargine Injectable (LANTUS) 40 Unit(s) SubCutaneous at bedtime  insulin glargine Injectable (LANTUS) 40 Unit(s) SubCutaneous every morning  insulin lispro (HumaLOG) corrective regimen sliding scale   SubCutaneous at bedtime  insulin lispro (HumaLOG) corrective regimen sliding scale   SubCutaneous three times a day before meals  insulin lispro Injectable (HumaLOG) 22 Unit(s) SubCutaneous before dinner  insulin lispro Injectable (HumaLOG) 10 Unit(s) SubCutaneous before lunch  insulin lispro Injectable (HumaLOG) 26 Unit(s) SubCutaneous before breakfast  metoprolol succinate  milliGRAM(s) Oral daily  pregabalin 100 milliGRAM(s) Oral two times a day    PRN Inpatient Medications  acetaminophen   Tablet .. 650 milliGRAM(s) Oral every 4 hours PRN  dextrose 40% Gel 15 Gram(s) Oral once PRN  glucagon  Injectable 1 milliGRAM(s) IntraMuscular once PRN  oxyCODONE    IR 5 milliGRAM(s) Oral two times a day PRN  sodium chloride 0.65% Nasal 1 Spray(s) Both Nostrils three times a day PRN      REVIEW OF SYSTEMS  --------------------------------------------------------------------------------    Gen: denies fevers/chills,  CVS: denies chest pain/palpitations  Resp: denies SOB/Cough  GI: Denies N/V/Abd pain  : Denies dysuria/oliguria/hematuria    All other systems were reviewed and are negative, except as noted.    VITALS/PHYSICAL EXAM  --------------------------------------------------------------------------------  T(C): 37 (11-13-19 @ 20:10), Max: 37.2 (11-13-19 @ 00:11)  HR: 94 (11-13-19 @ 20:10) (94 - 105)  BP: 106/65 (11-13-19 @ 20:10) (100/60 - 133/79)  RR: 19 (11-13-19 @ 20:10) (17 - 19)  SpO2: 93% (11-13-19 @ 20:10) (91% - 97%)  Wt(kg): --        11-12-19 @ 07:01  -  11-13-19 @ 07:00  --------------------------------------------------------  IN: 0 mL / OUT: 1190 mL / NET: -1190 mL    11-13-19 @ 07:01  -  11-13-19 @ 21:57  --------------------------------------------------------  IN: 720 mL / OUT: 820 mL / NET: -100 mL      Physical Exam:  	  Gen:  + obese appears with mildly tachypneic  on O2    	no jvd   	Pulm: decrease bs  R base upto 3/4  no rales or ronchi or wheezing + pigtail cath right   	CV: RRR, S1S2; no rub  	Abd: +BS, soft, nontender/nondistended  	: No suprapubic tenderness  	UE: Warm, no cyanosis  no clubbing,  no edema  	LE: Warm, no cyanosis  no clubbing, no edema  left knee no erythema or induration but tenderness over both medial and lateral meniscus area       LABS/STUDIES  --------------------------------------------------------------------------------              14.7   13.61 >-----------<  254      [11-13-19 @ 09:25]              48.5     137  |  89  |  55  ----------------------------<  209      [11-13-19 @ 06:43]  3.7   |  32  |  1.79        Ca     9.1     [11-13-19 @ 06:43]      PT/INR: PT 12.5 , INR 1.10       [11-12-19 @ 09:28]  PTT: 32.3       [11-12-19 @ 09:28]    Uric acid 11.0      [11-13-19 @ 11:53]    Creatinine Trend:  SCr 1.79 [11-13 @ 06:43]  SCr 1.78 [11-12 @ 06:22]  SCr 1.74 [11-11 @ 11:47]  SCr 1.98 [11-10 @ 06:10]  SCr 1.98 [11-09 @ 06:39]              Urinalysis - [10-17-19 @ 04:40]      Color Light Yellow / Appearance Clear / SG 1.015 / pH 6.5      Gluc 500 mg/dL / Ketone Negative  / Bili Negative / Urobili Negative       Blood Small / Protein 300 mg/dL / Leuk Est Negative / Nitrite Negative      RBC 8 / WBC 3 / Hyaline 0 / Gran  / Sq Epi  / Non Sq Epi 0 / Bacteria Negative      HbA1c 11.7      [11-07-19 @ 09:14]  TSH 1.71      [12-28-18 @ 14:58]

## 2019-11-13 NOTE — PROGRESS NOTE ADULT - SUBJECTIVE AND OBJECTIVE BOX
Follow-up Pulm Progress Note    C/o L knee pain  No new respiratory events overnight.  Denies SOB/CP.   Sats 96% 2LNC    Medications:  MEDICATIONS  (STANDING):  aspirin enteric coated 81 milliGRAM(s) Oral daily  buMETAnide 2 milliGRAM(s) Oral daily  dextrose 5%. 1000 milliLiter(s) (50 mL/Hr) IV Continuous <Continuous>  dextrose 50% Injectable 12.5 Gram(s) IV Push once  dextrose 50% Injectable 25 Gram(s) IV Push once  dextrose 50% Injectable 25 Gram(s) IV Push once  diltiazem    Tablet 30 milliGRAM(s) Oral three times a day  influenza   Vaccine 0.5 milliLiter(s) IntraMuscular once  insulin glargine Injectable (LANTUS) 40 Unit(s) SubCutaneous at bedtime  insulin glargine Injectable (LANTUS) 40 Unit(s) SubCutaneous every morning  insulin lispro (HumaLOG) corrective regimen sliding scale   SubCutaneous at bedtime  insulin lispro (HumaLOG) corrective regimen sliding scale   SubCutaneous three times a day before meals  insulin lispro Injectable (HumaLOG) 22 Unit(s) SubCutaneous before dinner  insulin lispro Injectable (HumaLOG) 10 Unit(s) SubCutaneous before lunch  insulin lispro Injectable (HumaLOG) 26 Unit(s) SubCutaneous before breakfast  metoprolol succinate  milliGRAM(s) Oral daily  pregabalin 100 milliGRAM(s) Oral two times a day    MEDICATIONS  (PRN):  acetaminophen   Tablet .. 650 milliGRAM(s) Oral every 4 hours PRN Moderate Pain (4 - 6)  dextrose 40% Gel 15 Gram(s) Oral once PRN Blood Glucose LESS THAN 70 milliGRAM(s)/deciliter  glucagon  Injectable 1 milliGRAM(s) IntraMuscular once PRN Glucose LESS THAN 70 milligrams/deciliter  oxyCODONE    IR 5 milliGRAM(s) Oral two times a day PRN Severe Pain (7 - 10)  sodium chloride 0.65% Nasal 1 Spray(s) Both Nostrils three times a day PRN Nasal Congestion        Vital Signs Last 24 Hrs  T(C): 36.7 (13 Nov 2019 12:07), Max: 37.2 (13 Nov 2019 00:11)  T(F): 98.1 (13 Nov 2019 12:07), Max: 99 (13 Nov 2019 00:11)  HR: 100 (13 Nov 2019 12:07) (67 - 100)  BP: 102/70 (13 Nov 2019 12:07) (100/57 - 144/79)  BP(mean): --  RR: 17 (13 Nov 2019 12:07) (17 - 18)  SpO2: 96% (13 Nov 2019 12:07) (94% - 97%)          11-12 @ 07:01  -  11-13 @ 07:00  --------------------------------------------------------  IN: 0 mL / OUT: 1190 mL / NET: -1190 mL          LABS:                        14.7   13.61 )-----------( 254      ( 13 Nov 2019 09:25 )             48.5     11-13    137  |  89<L>  |  55<H>  ----------------------------<  209<H>  3.7   |  32<H>  |  1.79<H>    Ca    9.1      13 Nov 2019 06:43        CAPILLARY BLOOD GLUCOSE      POCT Blood Glucose.: 158 mg/dL (13 Nov 2019 11:46)    PT/INR - ( 12 Nov 2019 09:28 )   PT: 12.5 sec;   INR: 1.10 ratio         PTT - ( 12 Nov 2019 09:28 )  PTT:32.3 sec          Fluid characteristics  PLEURAL 11-08 @ 18:05  pH 7.74    tprot 3.9    Cell count  Appearance Sl Bloody  Fluid type --  BF lymph 77  color Red  eosinophil 2  PMN 11  Mesothelial 2  Monocyte 8  Other body cells --      CULTURES:     Culture - Body Fluid with Gram Stain (collected 11-08-19 @ 22:02)  Source: .Body Fluid Pleural Fluid  Gram Stain (11-08-19 @ 23:14):    polymorphonuclear leukocytes seen    No organisms seen    by cytocentrifuge  Preliminary Report (11-09-19 @ 17:35):    No growth          Physical Examination:  PULM: Clear to auscultation bilaterally  CVS: irreg irreg    RADIOLOGY REVIEWED  CXR: decrease in R pleural effusion Follow-up Pulm Progress Note    C/o L knee pain  No new respiratory events overnight.  Denies SOB/CP.   Sats 96% 2LNC    Medications:  MEDICATIONS  (STANDING):  aspirin enteric coated 81 milliGRAM(s) Oral daily  buMETAnide 2 milliGRAM(s) Oral daily  dextrose 5%. 1000 milliLiter(s) (50 mL/Hr) IV Continuous <Continuous>  dextrose 50% Injectable 12.5 Gram(s) IV Push once  dextrose 50% Injectable 25 Gram(s) IV Push once  dextrose 50% Injectable 25 Gram(s) IV Push once  diltiazem    Tablet 30 milliGRAM(s) Oral three times a day  influenza   Vaccine 0.5 milliLiter(s) IntraMuscular once  insulin glargine Injectable (LANTUS) 40 Unit(s) SubCutaneous at bedtime  insulin glargine Injectable (LANTUS) 40 Unit(s) SubCutaneous every morning  insulin lispro (HumaLOG) corrective regimen sliding scale   SubCutaneous at bedtime  insulin lispro (HumaLOG) corrective regimen sliding scale   SubCutaneous three times a day before meals  insulin lispro Injectable (HumaLOG) 22 Unit(s) SubCutaneous before dinner  insulin lispro Injectable (HumaLOG) 10 Unit(s) SubCutaneous before lunch  insulin lispro Injectable (HumaLOG) 26 Unit(s) SubCutaneous before breakfast  metoprolol succinate  milliGRAM(s) Oral daily  pregabalin 100 milliGRAM(s) Oral two times a day    MEDICATIONS  (PRN):  acetaminophen   Tablet .. 650 milliGRAM(s) Oral every 4 hours PRN Moderate Pain (4 - 6)  dextrose 40% Gel 15 Gram(s) Oral once PRN Blood Glucose LESS THAN 70 milliGRAM(s)/deciliter  glucagon  Injectable 1 milliGRAM(s) IntraMuscular once PRN Glucose LESS THAN 70 milligrams/deciliter  oxyCODONE    IR 5 milliGRAM(s) Oral two times a day PRN Severe Pain (7 - 10)  sodium chloride 0.65% Nasal 1 Spray(s) Both Nostrils three times a day PRN Nasal Congestion        Vital Signs Last 24 Hrs  T(C): 36.7 (13 Nov 2019 12:07), Max: 37.2 (13 Nov 2019 00:11)  T(F): 98.1 (13 Nov 2019 12:07), Max: 99 (13 Nov 2019 00:11)  HR: 100 (13 Nov 2019 12:07) (67 - 100)  BP: 102/70 (13 Nov 2019 12:07) (100/57 - 144/79)  BP(mean): --  RR: 17 (13 Nov 2019 12:07) (17 - 18)  SpO2: 96% (13 Nov 2019 12:07) (94% - 97%)          11-12 @ 07:01  -  11-13 @ 07:00  --------------------------------------------------------  IN: 0 mL / OUT: 1190 mL / NET: -1190 mL          LABS:                        14.7   13.61 )-----------( 254      ( 13 Nov 2019 09:25 )             48.5     11-13    137  |  89<L>  |  55<H>  ----------------------------<  209<H>  3.7   |  32<H>  |  1.79<H>    Ca    9.1      13 Nov 2019 06:43        CAPILLARY BLOOD GLUCOSE      POCT Blood Glucose.: 158 mg/dL (13 Nov 2019 11:46)    PT/INR - ( 12 Nov 2019 09:28 )   PT: 12.5 sec;   INR: 1.10 ratio         PTT - ( 12 Nov 2019 09:28 )  PTT:32.3 sec          Fluid characteristics  PLEURAL 11-08 @ 18:05  pH 7.74    tprot 3.9    Cell count  Appearance Sl Bloody  Fluid type --  BF lymph 77  color Red  eosinophil 2  PMN 11  Mesothelial 2  Monocyte 8  Other body cells --      CULTURES:     Culture - Body Fluid with Gram Stain (collected 11-08-19 @ 22:02)  Source: .Body Fluid Pleural Fluid  Gram Stain (11-08-19 @ 23:14):    polymorphonuclear leukocytes seen    No organisms seen    by cytocentrifuge  Preliminary Report (11-09-19 @ 17:35):    No growth          Physical Examination:  Gen Alert, Oriented, NAD  PULM: Clear to auscultation bilaterally  CVS: irreg irreg  Ext Mild edema     RADIOLOGY REVIEWED  CXR: decrease in R pleural effusion

## 2019-11-13 NOTE — PROGRESS NOTE ADULT - ASSESSMENT
63 y/o M with PMH of CAD s/p stents, HFrEF (EF 40% 12/2018), DMT2, HTN, CKD, NSTEMI, Afib (on Pradaxa). Presents to ED with worsening SOB and productive cough with .   pt  recently hospitalized last month and found to have small R hydropneumothorax of unclear etiology. Plans previous admission for drainage of effusion and sampling of fluid and analysis, but patient refused at that time and agreed for outpt f/u. presents now w/ sob       Problem: Hydropneumothorax.   CT from previous admission with R sided hydropneumothorax of unclear etiology.  -Pt refused drainage at that time.  s/p thorocentesis/c/w exudate  s/p pigtail cath   c/w drainage  pulm/ir f/u       ·  Problem: CHF (congestive heart failure).  Recommendation: -Keep O>I as tolerated.   c/w current meds  cards eval noted    dm /uncontrolled  endo eval noted  fsg riss  c/w insulin regimen as per endo        dm neuropathy  c/w lyrica  oxycodone for severe pain     alex/ ckd  renal f/u   c/w recs as per renal     l knee pain   ? gout vs pseudogout  check uric acid  rheum to see

## 2019-11-13 NOTE — PROGRESS NOTE ADULT - SUBJECTIVE AND OBJECTIVE BOX
CHIEF COMPLAINT:Patient is a 64y old  Male who presents with a chief complaint of SOB and cough (12 Nov 2019 15:58)    	        PAST MEDICAL & SURGICAL HISTORY:  Neuropathy  CAD (coronary artery disease)  MI (myocardial infarction): circa 2014  Atrial fibrillation  TIA (transient ischemic attack)  DM type 2 (diabetes mellitus, type 2)  HLD (hyperlipidemia)  HTN (hypertension), benign  S/P carotid endarterectomy: left  Status post angioplasty with stent: LULU x 3 2/7/2014          REVIEW OF SYSTEMS:  CONSTITUTIONAL: No fever, weight loss, or fatigue  EYES: No eye pain, visual disturbances, or discharge  NECK: No pain or stiffness  RESPIRATORY:breathing better   CARDIOVASCULAR: No chest pain, palpitations, passing out, dizziness,  GASTROINTESTINAL: No abdominal or epigastric pain. No nausea, vomiting, or hematemesis; No diarrhea or constipation. No melena or hematochezia.  GENITOURINARY: No dysuria, frequency, hematuria, or incontinence  NEUROLOGICAL: No headaches, memory loss, loss of strength, numbness, or tremors  c/o l knee pain     Medications:  MEDICATIONS  (STANDING):  aspirin enteric coated 81 milliGRAM(s) Oral daily  buMETAnide 2 milliGRAM(s) Oral daily  dextrose 5%. 1000 milliLiter(s) (50 mL/Hr) IV Continuous <Continuous>  dextrose 50% Injectable 12.5 Gram(s) IV Push once  dextrose 50% Injectable 25 Gram(s) IV Push once  dextrose 50% Injectable 25 Gram(s) IV Push once  diltiazem    Tablet 30 milliGRAM(s) Oral three times a day  influenza   Vaccine 0.5 milliLiter(s) IntraMuscular once  insulin glargine Injectable (LANTUS) 40 Unit(s) SubCutaneous at bedtime  insulin glargine Injectable (LANTUS) 40 Unit(s) SubCutaneous every morning  insulin lispro (HumaLOG) corrective regimen sliding scale   SubCutaneous at bedtime  insulin lispro (HumaLOG) corrective regimen sliding scale   SubCutaneous three times a day before meals  insulin lispro Injectable (HumaLOG) 22 Unit(s) SubCutaneous before dinner  insulin lispro Injectable (HumaLOG) 10 Unit(s) SubCutaneous before lunch  insulin lispro Injectable (HumaLOG) 26 Unit(s) SubCutaneous before breakfast  metoprolol succinate  milliGRAM(s) Oral daily  pregabalin 100 milliGRAM(s) Oral two times a day    MEDICATIONS  (PRN):  acetaminophen   Tablet .. 650 milliGRAM(s) Oral every 4 hours PRN Moderate Pain (4 - 6)  dextrose 40% Gel 15 Gram(s) Oral once PRN Blood Glucose LESS THAN 70 milliGRAM(s)/deciliter  glucagon  Injectable 1 milliGRAM(s) IntraMuscular once PRN Glucose LESS THAN 70 milligrams/deciliter  oxyCODONE    IR 5 milliGRAM(s) Oral two times a day PRN Severe Pain (7 - 10)  sodium chloride 0.65% Nasal 1 Spray(s) Both Nostrils three times a day PRN Nasal Congestion    	    PHYSICAL EXAM:  T(C): 36.9 (11-13-19 @ 04:37), Max: 37.2 (11-13-19 @ 00:11)  HR: 96 (11-13-19 @ 04:37) (67 - 98)  BP: 119/84 (11-13-19 @ 04:37) (100/57 - 166/82)  RR: 18 (11-13-19 @ 04:37) (18 - 18)  SpO2: 97% (11-13-19 @ 04:37) (94% - 97%)  Wt(kg): --  I&O's Summary    12 Nov 2019 07:01  -  13 Nov 2019 07:00  --------------------------------------------------------  IN: 0 mL / OUT: 1190 mL / NET: -1190 mL    13 Nov 2019 07:01  -  13 Nov 2019 11:25  --------------------------------------------------------  IN: 240 mL / OUT: 100 mL / NET: 140 mL        Appearance: Normal	  HEENT:   Normal oral mucosa, PERRL, EOMI	  Lymphatic: No lymphadenopathy  Cardiovascular: Normal S1 S2, No JVD, No murmurs, No edema  Respiratory: Lungs clear to auscultation	  Psychiatry: A & O x 3, Mood & affect appropriate  Gastrointestinal:  Soft, Non-tender, + BS	  Skin: No rashes, No ecchymoses, No cyanosis	  Neurologic: Non-focal  Extremities: Normal range of motion, No clubbing, cyanosis or edema  Vascular: Peripheral pulses palpable 2+ bilaterally    TELEMETRY: 	    ECG:  	  RADIOLOGY:  OTHER: 	  	  LABS:	 	    CARDIAC MARKERS:                                14.7   13.61 )-----------( 254      ( 13 Nov 2019 09:25 )             48.5     11-13    137  |  89<L>  |  55<H>  ----------------------------<  209<H>  3.7   |  32<H>  |  1.79<H>    Ca    9.1      13 Nov 2019 06:43    TPro  7.2  /  Alb  3.2<L>  /  TBili  0.5  /  DBili  x   /  AST  13  /  ALT  6<L>  /  AlkPhos  89  11-11    proBNP:   Lipid Profile:   HgA1c:   TSH:

## 2019-11-13 NOTE — CONSULT NOTE ADULT - ASSESSMENT
Imp:  63 yo male with multiple medical issues.  Admitted due to SOB, related to recent pleural effusion/hydropneumotx.  Now with acute gout of L knee.  +Hyperuricemia.    Rec:  Treatment is challenging; patient is currently adamant that he does not want steroids due to prior episodes of sugar rising and foot pain (?) related to steroids.  NSAIDs should be avoided due to CKD.    He received colchicine x1 earlier today.  Can try a dose of 1.2mg x1 again in the AM.    Perhaps a one time dose of toradol can be considered if he does not respond; will d/w nephro  Long term he will need uric lowering treatment; hold off for now since it might exacerbate the attack.

## 2019-11-13 NOTE — PROGRESS NOTE ADULT - PROBLEM SELECTOR PLAN 1
- Check BG AC and HS  - Continue with Lantus 40 units BID for now, will evaluate based on FBG 11/14  - Continue with current pre meal Humalog doses (26-10-22), may adjust later today based on lunch and dinner values  - Continue with Humalog moderate correction scale AC meals and moderate scale HS  - Appt made for follow up with endocrinologist Dr. Real, 1/3/20 @ 2:15  - Plan discussed with patient  - pager: [phone]/ [phone]

## 2019-11-13 NOTE — DIETITIAN INITIAL EVALUATION ADULT. - SIGNS/SYMPTOMS
HgbA1c 11.7%, POCTs >200 in-patient BMI 37.7, pt reluctant to provide diet recall and declines nutrition education

## 2019-11-13 NOTE — PROGRESS NOTE ADULT - SUBJECTIVE AND OBJECTIVE BOX
STATUS POST:  placement of right chest pigtail drainage    POST OPERATIVE DAY #: 1    SUBJECTIVE:   No acute events overnight.   patient breathing better, complains of coughing overnight.       ---------------------------------------------------------------    Vital Signs Last 24 Hrs  T(C): 36.9 (13 Nov 2019 04:37), Max: 37.2 (13 Nov 2019 00:11)  T(F): 98.4 (13 Nov 2019 04:37), Max: 99 (13 Nov 2019 00:11)  HR: 96 (13 Nov 2019 04:37) (67 - 98)  BP: 119/84 (13 Nov 2019 04:37) (100/57 - 166/82)  BP(mean): --  RR: 18 (13 Nov 2019 04:37) (18 - 18)  SpO2: 97% (13 Nov 2019 04:37) (94% - 97%)    Physical Exam:  General: NAD, Comfortable in bed     Neuro: A&Ox3  Respiratory: nonlabored breathing, no respiratory distress  , right posterior chest pigtail drain site clean dry and intact, serous pigtail drainage, chest tube to wall suction, 190cc overnight      -------------------------------------------------------------------    LABS:                        14.7   13.61 )-----------( 254      ( 13 Nov 2019 09:25 )             48.5     11-13    137  |  89<L>  |  55<H>  ----------------------------<  209<H>  3.7   |  32<H>  |  1.79<H>    Ca    9.1      13 Nov 2019 06:43    TPro  7.2  /  Alb  3.2<L>  /  TBili  0.5  /  DBili  x   /  AST  13  /  ALT  6<L>  /  AlkPhos  89  11-11    PT/INR - ( 12 Nov 2019 09:28 )   PT: 12.5 sec;   INR: 1.10 ratio         PTT - ( 12 Nov 2019 09:28 )  PTT:32.3 sec        RADIOLOGY & ADDITIONAL STUDIES: STATUS POST:  placement of right chest pigtail drainage    POST OPERATIVE DAY #: 1    SUBJECTIVE:   No acute events overnight.   patient breathing better, complains of coughing overnight.     ---------------------------------------------------------------    Vital Signs Last 24 Hrs  T(C): 36.9 (13 Nov 2019 04:37), Max: 37.2 (13 Nov 2019 00:11)  T(F): 98.4 (13 Nov 2019 04:37), Max: 99 (13 Nov 2019 00:11)  HR: 96 (13 Nov 2019 04:37) (67 - 98)  BP: 119/84 (13 Nov 2019 04:37) (100/57 - 166/82)  BP(mean): --  RR: 18 (13 Nov 2019 04:37) (18 - 18)  SpO2: 97% (13 Nov 2019 04:37) (94% - 97%)    Physical Exam:  General: NAD, Comfortable in bed     Neuro: A&Ox3  Respiratory: nonlabored breathing, no respiratory distress  , right posterior chest pigtail drain site clean dry and intact, serous pigtail drainage, chest tube to wall suction, 190cc overnight      -------------------------------------------------------------------    LABS:                        14.7   13.61 )-----------( 254      ( 13 Nov 2019 09:25 )             48.5     11-13    137  |  89<L>  |  55<H>  ----------------------------<  209<H>  3.7   |  32<H>  |  1.79<H>    Ca    9.1      13 Nov 2019 06:43    TPro  7.2  /  Alb  3.2<L>  /  TBili  0.5  /  DBili  x   /  AST  13  /  ALT  6<L>  /  AlkPhos  89  11-11    PT/INR - ( 12 Nov 2019 09:28 )   PT: 12.5 sec;   INR: 1.10 ratio         PTT - ( 12 Nov 2019 09:28 )  PTT:32.3 sec        RADIOLOGY & ADDITIONAL STUDIES:

## 2019-11-13 NOTE — PROGRESS NOTE ADULT - SUBJECTIVE AND OBJECTIVE BOX
CARDIOLOGY FOLLOW UP - Dr. Mazariegos    CC  no cp or palps  c.o left knee pain   events noted, sustaining rapid afib     PHYSICAL EXAM:  T(C): 36.9 (11-13-19 @ 04:37), Max: 37.2 (11-13-19 @ 00:11)  HR: 96 (11-13-19 @ 04:37) (67 - 98)  BP: 119/84 (11-13-19 @ 04:37) (100/57 - 166/82)  RR: 18 (11-13-19 @ 04:37) (18 - 18)  SpO2: 97% (11-13-19 @ 04:37) (94% - 97%)  Wt(kg): --  I&O's Summary    12 Nov 2019 07:01  -  13 Nov 2019 07:00  --------------------------------------------------------  IN: 0 mL / OUT: 1190 mL / NET: -1190 mL    13 Nov 2019 07:01  -  13 Nov 2019 10:18  --------------------------------------------------------  IN: 240 mL / OUT: 100 mL / NET: 140 mL        Appearance: Normal	  Cardiovascular: Normal S1 S2  irregular , tachycardia   Respiratory: diminished 	  Gastrointestinal:  Soft, Non-tender, + BS	  Extremities: Normal range of motion, trace le edema        MEDICATIONS  (STANDING):  aspirin enteric coated 81 milliGRAM(s) Oral daily  buMETAnide 2 milliGRAM(s) Oral daily  dextrose 5%. 1000 milliLiter(s) (50 mL/Hr) IV Continuous <Continuous>  dextrose 50% Injectable 12.5 Gram(s) IV Push once  dextrose 50% Injectable 25 Gram(s) IV Push once  dextrose 50% Injectable 25 Gram(s) IV Push once  influenza   Vaccine 0.5 milliLiter(s) IntraMuscular once  insulin glargine Injectable (LANTUS) 40 Unit(s) SubCutaneous at bedtime  insulin glargine Injectable (LANTUS) 40 Unit(s) SubCutaneous every morning  insulin lispro (HumaLOG) corrective regimen sliding scale   SubCutaneous at bedtime  insulin lispro (HumaLOG) corrective regimen sliding scale   SubCutaneous three times a day before meals  insulin lispro Injectable (HumaLOG) 22 Unit(s) SubCutaneous before dinner  insulin lispro Injectable (HumaLOG) 10 Unit(s) SubCutaneous before lunch  insulin lispro Injectable (HumaLOG) 26 Unit(s) SubCutaneous before breakfast  metoprolol succinate  milliGRAM(s) Oral daily  pregabalin 100 milliGRAM(s) Oral two times a day      TELEMETRY: afib hr 120s - high as 160s	    ECG:  	  RADIOLOGY:   DIAGNOSTIC TESTING:  [ ] Echocardiogram:  [ ]  Catheterization:  [ ] Stress Test:    OTHER: 	    LABS:	 	                            14.7   13.61 )-----------( 254      ( 13 Nov 2019 09:25 )             48.5     11-13    137  |  89<L>  |  55<H>  ----------------------------<  209<H>  3.7   |  32<H>  |  1.79<H>    Ca    9.1      13 Nov 2019 06:43    TPro  7.2  /  Alb  3.2<L>  /  TBili  0.5  /  DBili  x   /  AST  13  /  ALT  6<L>  /  AlkPhos  89  11-11    PT/INR - ( 12 Nov 2019 09:28 )   PT: 12.5 sec;   INR: 1.10 ratio         PTT - ( 12 Nov 2019 09:28 )  PTT:32.3 sec

## 2019-11-13 NOTE — PROGRESS NOTE ADULT - ASSESSMENT
64 year old male with Hypertension, Hyperlipidemia, Atrial Fibrillation, s/p DCCV, Coronary Artery Disease, NSTEMI, (2/2014), s/p PCI to mid LAD, Proximal LCx, Distal LCx (2/14, [facility], LULU), Congestive Heart Failure, Normal Ejection Fraction, Diabetes Mellitus, CVA, Severe Left Internal Carotid Disease, s/p CEA (2/2014) presenting with worsening SOB, cough.     1.Right-sided Pleural effusion, Hydropneumothorax  -s/p right pleural pigtail catheter, cont drainage-serosanguinous fluid   -s/p right thoracentesis 11/8/0219, bloody exudate  -f/u studies, cytology  -pulmo/thoracic f/u for further management;  s/p R pigtail    2. Chronic Congestive heart failure, Diastolic.   -stable, minimal LE edema, no evidence of ADHF on exam   -continue current diuresis regimen, cont bumex/ metol to Q 48  -recent echo with no evidence of pericardial effusion, mod LAE, grossly borderline LV sys dysfx EF 45% likely underestimated due to AF     3. Atrial Fibrillation, chronic   -RVR asymptomatic, s/p 5 mg IV lopressor  - c/w metoprolol succinate ER 100mg daily  - start cardizem 30 mg PO TID   -s/p pigtail placement, Resume a/c once cleared by Pulm and IR     4. Coronary Artery Disease. S/P PCI to LAD, LCx.  -stable, Continue ASA 81mg and statin    5. Knee pain  r/o gout ?, med to follow up     dvt ppx

## 2019-11-13 NOTE — DIETITIAN INITIAL EVALUATION ADULT. - PHYSICAL APPEARANCE
other (specify)/obese Height: 72 inches, Weight: 278.2 pounds (current 11/13)  BMI: 37.7 kg/m2 IBW: 178 pounds (+/-10%), %IBW: 156%  Pertinent Info: 1+ edema to b/l legs noted, no pressure injuries noted at this time in nursing flow sheet.

## 2019-11-13 NOTE — DIETITIAN INITIAL EVALUATION ADULT. - OTHER INFO
Pt is 64yoM with PMH significant for "Hypertension, Hyperlipidemia, Atrial Fibrillation, s/p DCCV, Coronary Artery Disease, NSTEMI, (2/2014), s/p PCI to mid LAD, Proximal LCx, Distal LCx (2/14, [facility], LULU), Congestive Heart Failure,  Diabetes Mellitus, CVA, Severe Left Internal Carotid Disease, s/p CEA (2/2014) recently admitted for hydropneumothorax." Now admitted with R pleural effusion again, s/p thoracentesis 11/8 and s/p R pigtail.    Therapeutic Diet PTA: pt reports following a diabetes friendly diet PTA, reports no concentrated sweets, no fruit juice, no soda. Does endorse regular intake of fruits throughout the day (~1 serving per sitting). Pt declines providing extensive diet recall- reports adequate vegetable and protein intake. Pt reports following a mostly low sodium diet at home, however some foods "need salt," so he cooks with it at times and dislikes salt substitutes. Pt states he monitors fluid intake but does not elaborate further or quantify.  Pt reports good po intake in-patient, consuming ~100% of meals. Pt complaining that he is being given too many carbs here and that's why his BG is spiking. However, pt reports he started leaving over some CHO (fruits) provided and has not even been eating all the CHO on the tray. Pt reports no snacking between meals or in the evenings.    Pt denies chewing/swallowing difficulties.  Pt has no c/o nausea, vomiting, diarrhea, or constipation.     Pt UBW: ~244 lbs, as reflected by dosing wt. Pt most recent wt noted as 278.2 lbs with diuretic use noted and fluid shifts noted.  Weight history per chart: 332.4 lbs (2/12/17), 302.2 lbs (5/3/18), 295.1 lbs (1/1/19), and 305.1 lbs (10/19/19). Weight fluctuations noted, confounded by fluid shifts likely.     Self monitoring PTA: Pt reports SMBG multiple times a day with his usual values exceeding 200 mg/dL pre-meals. He states he takes insulin as directed, rotating sites as directed. States he sees his endocrinologist every 3 months. Last HgbA1c 11.7% indicative of poor BG control. Pt states his HgbA1c used to be stable between 7.0-8.0% as recent as 6 months ago. Endocrinology following pt here for insulin/BG management.    Nutrition Supplements PTA: none reported  NKFA reported.    Nutrition education provided: pt largely disinterested in nutrition education. Low sodium intake, fluid monitoring, weight monitoring and SMBG encouraged as well as mixed meals/snacks and reducing overall CHO intake in attempt to get BG under control. Pt declines written materials.

## 2019-11-13 NOTE — PROGRESS NOTE ADULT - ASSESSMENT
65 yo m presents with shortness of breath, found to have right sided pleural effusion s/p thoracentesis 11/8 with reaccumulation, now s/p right chest pigtail catheter    - chest tube to wall suction  - monitor chest tube output  - Ir will follow

## 2019-11-14 LAB
ANION GAP SERPL CALC-SCNC: 15 MMOL/L — SIGNIFICANT CHANGE UP (ref 5–17)
BUN SERPL-MCNC: 60 MG/DL — HIGH (ref 7–23)
CALCIUM SERPL-MCNC: 9.2 MG/DL — SIGNIFICANT CHANGE UP (ref 8.4–10.5)
CHLORIDE SERPL-SCNC: 89 MMOL/L — LOW (ref 96–108)
CO2 SERPL-SCNC: 33 MMOL/L — HIGH (ref 22–31)
CREAT SERPL-MCNC: 2.17 MG/DL — HIGH (ref 0.5–1.3)
GLUCOSE BLDC GLUCOMTR-MCNC: 106 MG/DL — HIGH (ref 70–99)
GLUCOSE BLDC GLUCOMTR-MCNC: 111 MG/DL — HIGH (ref 70–99)
GLUCOSE BLDC GLUCOMTR-MCNC: 190 MG/DL — HIGH (ref 70–99)
GLUCOSE BLDC GLUCOMTR-MCNC: 254 MG/DL — HIGH (ref 70–99)
GLUCOSE SERPL-MCNC: 124 MG/DL — HIGH (ref 70–99)
HCT VFR BLD CALC: 50.3 % — HIGH (ref 39–50)
HGB BLD-MCNC: 15.2 G/DL — SIGNIFICANT CHANGE UP (ref 13–17)
MCHC RBC-ENTMCNC: 25.5 PG — LOW (ref 27–34)
MCHC RBC-ENTMCNC: 30.2 GM/DL — LOW (ref 32–36)
MCV RBC AUTO: 84.3 FL — SIGNIFICANT CHANGE UP (ref 80–100)
NON-GYNECOLOGICAL CYTOLOGY STUDY: SIGNIFICANT CHANGE UP
PLATELET # BLD AUTO: 234 K/UL — SIGNIFICANT CHANGE UP (ref 150–400)
POTASSIUM SERPL-MCNC: 3.5 MMOL/L — SIGNIFICANT CHANGE UP (ref 3.5–5.3)
POTASSIUM SERPL-SCNC: 3.5 MMOL/L — SIGNIFICANT CHANGE UP (ref 3.5–5.3)
RBC # BLD: 5.97 M/UL — HIGH (ref 4.2–5.8)
RBC # FLD: 16 % — HIGH (ref 10.3–14.5)
SODIUM SERPL-SCNC: 137 MMOL/L — SIGNIFICANT CHANGE UP (ref 135–145)
URATE SERPL-MCNC: 11.4 MG/DL — HIGH (ref 3.4–8.8)
WBC # BLD: 11.6 K/UL — HIGH (ref 3.8–10.5)
WBC # FLD AUTO: 11.6 K/UL — HIGH (ref 3.8–10.5)

## 2019-11-14 PROCEDURE — 99231 SBSQ HOSP IP/OBS SF/LOW 25: CPT

## 2019-11-14 PROCEDURE — 71045 X-RAY EXAM CHEST 1 VIEW: CPT | Mod: 26

## 2019-11-14 PROCEDURE — 71250 CT THORAX DX C-: CPT | Mod: 26

## 2019-11-14 PROCEDURE — 99232 SBSQ HOSP IP/OBS MODERATE 35: CPT

## 2019-11-14 RX ORDER — COLCHICINE 0.6 MG
1.2 TABLET ORAL ONCE
Refills: 0 | Status: COMPLETED | OUTPATIENT
Start: 2019-11-15 | End: 2019-11-15

## 2019-11-14 RX ORDER — INSULIN LISPRO 100/ML
10 VIAL (ML) SUBCUTANEOUS ONCE
Refills: 0 | Status: COMPLETED | OUTPATIENT
Start: 2019-11-14 | End: 2019-11-14

## 2019-11-14 RX ORDER — INSULIN LISPRO 100/ML
20 VIAL (ML) SUBCUTANEOUS
Refills: 0 | Status: DISCONTINUED | OUTPATIENT
Start: 2019-11-15 | End: 2019-11-17

## 2019-11-14 RX ORDER — INSULIN GLARGINE 100 [IU]/ML
35 INJECTION, SOLUTION SUBCUTANEOUS EVERY MORNING
Refills: 0 | Status: DISCONTINUED | OUTPATIENT
Start: 2019-11-15 | End: 2019-11-17

## 2019-11-14 RX ORDER — INSULIN LISPRO 100/ML
12 VIAL (ML) SUBCUTANEOUS
Refills: 0 | Status: DISCONTINUED | OUTPATIENT
Start: 2019-11-14 | End: 2019-11-17

## 2019-11-14 RX ORDER — INSULIN GLARGINE 100 [IU]/ML
35 INJECTION, SOLUTION SUBCUTANEOUS AT BEDTIME
Refills: 0 | Status: DISCONTINUED | OUTPATIENT
Start: 2019-11-14 | End: 2019-11-17

## 2019-11-14 RX ADMIN — OXYCODONE HYDROCHLORIDE 5 MILLIGRAM(S): 5 TABLET ORAL at 20:02

## 2019-11-14 RX ADMIN — INSULIN GLARGINE 40 UNIT(S): 100 INJECTION, SOLUTION SUBCUTANEOUS at 10:29

## 2019-11-14 RX ADMIN — OXYCODONE HYDROCHLORIDE 5 MILLIGRAM(S): 5 TABLET ORAL at 10:30

## 2019-11-14 RX ADMIN — Medication 30 MILLIGRAM(S): at 21:38

## 2019-11-14 RX ADMIN — Medication 100 MILLIGRAM(S): at 05:00

## 2019-11-14 RX ADMIN — INSULIN GLARGINE 35 UNIT(S): 100 INJECTION, SOLUTION SUBCUTANEOUS at 21:37

## 2019-11-14 RX ADMIN — OXYCODONE HYDROCHLORIDE 5 MILLIGRAM(S): 5 TABLET ORAL at 11:53

## 2019-11-14 RX ADMIN — Medication 81 MILLIGRAM(S): at 12:01

## 2019-11-14 RX ADMIN — Medication 12 UNIT(S): at 12:02

## 2019-11-14 RX ADMIN — BUMETANIDE 2 MILLIGRAM(S): 0.25 INJECTION INTRAMUSCULAR; INTRAVENOUS at 05:00

## 2019-11-14 RX ADMIN — OXYCODONE HYDROCHLORIDE 5 MILLIGRAM(S): 5 TABLET ORAL at 20:32

## 2019-11-14 RX ADMIN — Medication 100 MILLIGRAM(S): at 17:22

## 2019-11-14 RX ADMIN — Medication 22 UNIT(S): at 17:22

## 2019-11-14 RX ADMIN — Medication 2: at 21:37

## 2019-11-14 RX ADMIN — Medication 30 MILLIGRAM(S): at 17:22

## 2019-11-14 RX ADMIN — Medication 2: at 12:02

## 2019-11-14 RX ADMIN — Medication 10 UNIT(S): at 10:30

## 2019-11-14 RX ADMIN — Medication 30 MILLIGRAM(S): at 05:00

## 2019-11-14 NOTE — CONSULT NOTE ADULT - SUBJECTIVE AND OBJECTIVE BOX
HPI:    PMHx: Neuropathy  CAD (coronary artery disease)  MI (myocardial infarction)  Atrial fibrillation  TIA (transient ischemic attack)  DM type 2 (diabetes mellitus, type 2)  HLD (hyperlipidemia)  HTN (hypertension), benign    PSHx: S/P CABG (coronary artery bypass graft)  S/P carotid endarterectomy  Status post angioplasty with stent    Medications (inpatient): aspirin enteric coated 81 milliGRAM(s) Oral daily  buMETAnide 2 milliGRAM(s) Oral daily  colchicine 1.2 milliGRAM(s) Oral once  dextrose 5%. 1000 milliLiter(s) IV Continuous <Continuous>  dextrose 50% Injectable 12.5 Gram(s) IV Push once  dextrose 50% Injectable 25 Gram(s) IV Push once  dextrose 50% Injectable 25 Gram(s) IV Push once  diltiazem    Tablet 30 milliGRAM(s) Oral three times a day  influenza   Vaccine 0.5 milliLiter(s) IntraMuscular once  insulin glargine Injectable (LANTUS) 35 Unit(s) SubCutaneous at bedtime  insulin lispro (HumaLOG) corrective regimen sliding scale   SubCutaneous at bedtime  insulin lispro (HumaLOG) corrective regimen sliding scale   SubCutaneous three times a day before meals  insulin lispro Injectable (HumaLOG) 22 Unit(s) SubCutaneous before dinner  insulin lispro Injectable (HumaLOG) 12 Unit(s) SubCutaneous before lunch  insulin lispro Injectable (HumaLOG) 26 Unit(s) SubCutaneous before breakfast  metoprolol succinate  milliGRAM(s) Oral daily  pregabalin 100 milliGRAM(s) Oral two times a day    Medications (PRN):acetaminophen   Tablet .. 650 milliGRAM(s) Oral every 4 hours PRN  dextrose 40% Gel 15 Gram(s) Oral once PRN  glucagon  Injectable 1 milliGRAM(s) IntraMuscular once PRN  oxyCODONE    IR 5 milliGRAM(s) Oral two times a day PRN  sodium chloride 0.65% Nasal 1 Spray(s) Both Nostrils three times a day PRN    Allergies: No Known Allergies  (Intolerances: )  Social Hx:     Physical Exam  T(C): 36.9 (11-14-19 @ 04:38)  HR: 80 (11-14-19 @ 04:38) (80 - 105)  BP: 127/60 (11-14-19 @ 04:38) (100/60 - 127/60)  RR: 19 (11-14-19 @ 04:38) (17 - 19)  SpO2: 94% (11-14-19 @ 04:38) (91% - 96%)  Wt(kg): --  Tmax: T(C): , Max: 37 (11-13-19 @ 20:10)  Wt(kg): --    11-13-19  -  11-14-19  --------------------------------------------------------  IN:    Oral Fluid: 720 mL  Total IN: 720 mL    OUT:    Chest Tube: 120 mL    Voided: 1660 mL  Total OUT: 1780 mL    Total NET: -1060 mL      11-14-19  -  11-14-19  --------------------------------------------------------  IN:  Total IN: 0 mL    OUT:    Voided: 800 mL  Total OUT: 800 mL    Total NET: -800 mL        General: well developed, well nourished, NAD  Neuro: alert and oriented, no focal deficits, moves all extremities spontaneously  HEENT: NCAT, EOMI, anicteric, mucosa moist  Respiratory: airway patent, respirations unlabored  CVS: regular rate and rhythm  Abdomen: soft, nontender, nondistended  Extremities: no edema, sensation and movement grossly intact  Skin: warm, dry, appropriate color    Labs:                        15.2   11.60 )-----------( 234      ( 14 Nov 2019 08:40 )             50.3       11-14    137  |  89<L>  |  60<H>  ----------------------------<  124<H>  3.5   |  33<H>  |  2.17<H>    Ca    9.2      14 Nov 2019 06:24              Imaging and other studies: HPI:    65 y/o M with PMH of CAD s/p stents, HFrEF (EF 40% 12/2018), DMT2, HTN, CKD, NSTEMI, Afib (on Pradaxa). Presents to ED with worsening SOB and productive cough with clear/whitish sputum for the past week. He was recently hospitalized last month and found to have small R hydropneumothorax of unclear etiology. Plans previous admission for drainage of effusion and sampling of fluid and analysis, but patient refused at that time and agreed for outpt f/u. Wanted to go to [facility] wedding. Did not present for outpt visit, called me this am with increased sob and I suggested he goes to ed.  PT pending CT chest non contrast. Denies fevers, chills, CP.     64 year old male with Hypertension, Hyperlipidemia, Atrial Fibrillation, s/p DCCV, Coronary Artery Disease, NSTEMI, (2/2014), s/p PCI to mid LAD, Proximal LCx, Distal LCx (2/14, [facility], [facility]), Congestive Heart Failure, Normal Ejection Fraction, Diabetes Mellitus, CVA, Severe Left Internal Carotid Disease, s/p CEA (2/2014) recently admitted for hydropneumothorax, a drainage of effusion and sampling of fluid and analysis was suggested however patient refused at that time. Pt. presenting today with worsening SOB and cough. Denies chest pain, LE edema. States he feels like he is more dependent on supplemental o2 however has not been approved for it at home. He was sent in by Dr. El as well as our office for re-peat CT chest and likely drainage of effusion.          PMHx: Neuropathy  CAD (coronary artery disease)  MI (myocardial infarction)  Atrial fibrillation  TIA (transient ischemic attack)  DM type 2 (diabetes mellitus, type 2)  HLD (hyperlipidemia)  HTN (hypertension), benign    PSHx: S/P CABG (coronary artery bypass graft)  S/P carotid endarterectomy  Status post angioplasty with stent    Medications (inpatient): aspirin enteric coated 81 milliGRAM(s) Oral daily  buMETAnide 2 milliGRAM(s) Oral daily  colchicine 1.2 milliGRAM(s) Oral once  dextrose 5%. 1000 milliLiter(s) IV Continuous <Continuous>  dextrose 50% Injectable 12.5 Gram(s) IV Push once  dextrose 50% Injectable 25 Gram(s) IV Push once  dextrose 50% Injectable 25 Gram(s) IV Push once  diltiazem    Tablet 30 milliGRAM(s) Oral three times a day  influenza   Vaccine 0.5 milliLiter(s) IntraMuscular once  insulin glargine Injectable (LANTUS) 35 Unit(s) SubCutaneous at bedtime  insulin lispro (HumaLOG) corrective regimen sliding scale   SubCutaneous at bedtime  insulin lispro (HumaLOG) corrective regimen sliding scale   SubCutaneous three times a day before meals  insulin lispro Injectable (HumaLOG) 22 Unit(s) SubCutaneous before dinner  insulin lispro Injectable (HumaLOG) 12 Unit(s) SubCutaneous before lunch  insulin lispro Injectable (HumaLOG) 26 Unit(s) SubCutaneous before breakfast  metoprolol succinate  milliGRAM(s) Oral daily  pregabalin 100 milliGRAM(s) Oral two times a day    Medications (PRN):acetaminophen   Tablet .. 650 milliGRAM(s) Oral every 4 hours PRN  dextrose 40% Gel 15 Gram(s) Oral once PRN  glucagon  Injectable 1 milliGRAM(s) IntraMuscular once PRN  oxyCODONE    IR 5 milliGRAM(s) Oral two times a day PRN  sodium chloride 0.65% Nasal 1 Spray(s) Both Nostrils three times a day PRN    Allergies: No Known Allergies  (Intolerances: )  Social Hx:     Physical Exam  T(C): 36.9 (11-14-19 @ 04:38)  HR: 80 (11-14-19 @ 04:38) (80 - 105)  BP: 127/60 (11-14-19 @ 04:38) (100/60 - 127/60)  RR: 19 (11-14-19 @ 04:38) (17 - 19)  SpO2: 94% (11-14-19 @ 04:38) (91% - 96%)  Wt(kg): --  Tmax: T(C): , Max: 37 (11-13-19 @ 20:10)  Wt(kg): --    11-13-19  -  11-14-19  --------------------------------------------------------  IN:    Oral Fluid: 720 mL  Total IN: 720 mL    OUT:    Chest Tube: 120 mL    Voided: 1660 mL  Total OUT: 1780 mL    Total NET: -1060 mL      11-14-19  -  11-14-19  --------------------------------------------------------  IN:  Total IN: 0 mL    OUT:    Voided: 800 mL  Total OUT: 800 mL    Total NET: -800 mL        General: well developed, well nourished, NAD  Neuro: alert and oriented, no focal deficits, moves all extremities spontaneously  HEENT: NCAT, EOMI, anicteric, mucosa moist  Respiratory: airway patent, respirations unlabored  CVS: regular rate and rhythm  Abdomen: soft, nontender, nondistended  Extremities: no edema, sensation and movement grossly intact  Skin: warm, dry, appropriate color    Labs:                        15.2   11.60 )-----------( 234      ( 14 Nov 2019 08:40 )             50.3       11-14    137  |  89<L>  |  60<H>  ----------------------------<  124<H>  3.5   |  33<H>  |  2.17<H>    Ca    9.2      14 Nov 2019 06:24              Imaging and other studies: HPI:      64 year old male with HTN, HLD, Afib on pradaxa, s/p DCCV, Coronary Artery Disease, NSTEMI, (2/2014), s/p PCI to mid LAD, Proximal LCx, Distal LCx (2/14, [facility], [facility]), CHF w/ preserved EF, DM2, CVA, L carotid stenosis, s/p CEA (2/2014) presenting with increased SOB. recently admitted for hydropneumothorax, a drainage of effusion and sampling of fluid and analysis was suggested however patient refused at that time. Pt. presenting today with worsening SOB and cough. Denies chest pain, LE edema. States he feels like he is more dependent on supplemental o2 however has not been approved for it at home. He was sent in by Dr. El as well as our office for re-peat CT chest and likely drainage of effusion.      11/8 thoracentisis by IR w/ 490cc blood tinged fluid found to be exudative effusion. Repeat CT w/ decreased ***. 11/12 pigtail catheter placed by IR w/ 1L serosang/bloody fluid drained.         PMHx: Neuropathy  CAD (coronary artery disease)  MI (myocardial infarction)  Atrial fibrillation  TIA (transient ischemic attack)  DM type 2 (diabetes mellitus, type 2)  HLD (hyperlipidemia)  HTN (hypertension), benign    PSHx: S/P CABG (coronary artery bypass graft)  S/P carotid endarterectomy  Status post angioplasty with stent    Medications (inpatient): aspirin enteric coated 81 milliGRAM(s) Oral daily  buMETAnide 2 milliGRAM(s) Oral daily  colchicine 1.2 milliGRAM(s) Oral once  dextrose 5%. 1000 milliLiter(s) IV Continuous <Continuous>  dextrose 50% Injectable 12.5 Gram(s) IV Push once  dextrose 50% Injectable 25 Gram(s) IV Push once  dextrose 50% Injectable 25 Gram(s) IV Push once  diltiazem    Tablet 30 milliGRAM(s) Oral three times a day  influenza   Vaccine 0.5 milliLiter(s) IntraMuscular once  insulin glargine Injectable (LANTUS) 35 Unit(s) SubCutaneous at bedtime  insulin lispro (HumaLOG) corrective regimen sliding scale   SubCutaneous at bedtime  insulin lispro (HumaLOG) corrective regimen sliding scale   SubCutaneous three times a day before meals  insulin lispro Injectable (HumaLOG) 22 Unit(s) SubCutaneous before dinner  insulin lispro Injectable (HumaLOG) 12 Unit(s) SubCutaneous before lunch  insulin lispro Injectable (HumaLOG) 26 Unit(s) SubCutaneous before breakfast  metoprolol succinate  milliGRAM(s) Oral daily  pregabalin 100 milliGRAM(s) Oral two times a day    Medications (PRN):acetaminophen   Tablet .. 650 milliGRAM(s) Oral every 4 hours PRN  dextrose 40% Gel 15 Gram(s) Oral once PRN  glucagon  Injectable 1 milliGRAM(s) IntraMuscular once PRN  oxyCODONE    IR 5 milliGRAM(s) Oral two times a day PRN  sodium chloride 0.65% Nasal 1 Spray(s) Both Nostrils three times a day PRN    Allergies: No Known Allergies  (Intolerances: )  Social Hx:     Physical Exam  T(C): 36.9 (11-14-19 @ 04:38)  HR: 80 (11-14-19 @ 04:38) (80 - 105)  BP: 127/60 (11-14-19 @ 04:38) (100/60 - 127/60)  RR: 19 (11-14-19 @ 04:38) (17 - 19)  SpO2: 94% (11-14-19 @ 04:38) (91% - 96%)  Wt(kg): --  Tmax: T(C): , Max: 37 (11-13-19 @ 20:10)  Wt(kg): --    11-13-19  -  11-14-19  --------------------------------------------------------  IN:    Oral Fluid: 720 mL  Total IN: 720 mL    OUT:    Chest Tube: 120 mL    Voided: 1660 mL  Total OUT: 1780 mL    Total NET: -1060 mL      11-14-19 - 11-14-19  --------------------------------------------------------  IN:  Total IN: 0 mL    OUT:    Voided: 800 mL  Total OUT: 800 mL    Total NET: -800 mL        General: well developed, well nourished, NAD  Neuro: alert and oriented, no focal deficits, moves all extremities spontaneously  HEENT: NCAT, EOMI, anicteric, mucosa moist  Respiratory: airway patent, respirations unlabored  CVS: regular rate and rhythm  Abdomen: soft, nontender, nondistended  Extremities: no edema, sensation and movement grossly intact  Skin: warm, dry, appropriate color    Labs:                        15.2   11.60 )-----------( 234      ( 14 Nov 2019 08:40 )             50.3       11-14    137  |  89<L>  |  60<H>  ----------------------------<  124<H>  3.5   |  33<H>  |  2.17<H>    Ca    9.2      14 Nov 2019 06:24              Imaging and other studies: HPI: 64 year old male with HTN, HLD, Afib on pradaxa, s/p DCCV, Coronary Artery Disease, NSTEMI, (2/2014), s/p PCI to mid LAD, Proximal LCx, Distal LCx (2/14, [facility], [facility]), CHF w/ preserved EF, DM2, CVA, L carotid stenosis, s/p CEA (2/2014) p/w with increased SOB and cough.  Pt was recently admitted 10/16 found to have hydropneumothorax where he was offered drainage of effusion but patient deferred for outpatient follow up so he could attend his son's wedding. Since readmission: 11/5 CT increase from 10/16 CT in partially loculated moderate right pleural effusion w/ compressive atelectasis w/o pneumothorax. 11/8 thoracentisis by IR w/ 490cc blood tinged fluid found to be exudative effusion. 11/8 CT s/p IR thoracentesis w/ decreased pleural effusion w/o pneumothorax. 11/12 pigtail catheter placed by IR w/ 1L serosang/bloody fluid drained. 11/12 CXR w/ resolution of right pleural effusion. 11/13 small right hydropneumothorax.      Thoracic surgery consulted for hydropneumothorax     PMHx: Neuropathy  CAD (coronary artery disease)  MI (myocardial infarction)  Atrial fibrillation  TIA (transient ischemic attack)  DM type 2 (diabetes mellitus, type 2)  HLD (hyperlipidemia)  HTN (hypertension), benign    PSHx: S/P CABG (coronary artery bypass graft)  S/P carotid endarterectomy  Status post angioplasty with stent    Medications (inpatient): aspirin enteric coated 81 milliGRAM(s) Oral daily  buMETAnide 2 milliGRAM(s) Oral daily  colchicine 1.2 milliGRAM(s) Oral once  dextrose 5%. 1000 milliLiter(s) IV Continuous <Continuous>  dextrose 50% Injectable 12.5 Gram(s) IV Push once  dextrose 50% Injectable 25 Gram(s) IV Push once  dextrose 50% Injectable 25 Gram(s) IV Push once  diltiazem    Tablet 30 milliGRAM(s) Oral three times a day  influenza   Vaccine 0.5 milliLiter(s) IntraMuscular once  insulin glargine Injectable (LANTUS) 35 Unit(s) SubCutaneous at bedtime  insulin lispro (HumaLOG) corrective regimen sliding scale   SubCutaneous at bedtime  insulin lispro (HumaLOG) corrective regimen sliding scale   SubCutaneous three times a day before meals  insulin lispro Injectable (HumaLOG) 22 Unit(s) SubCutaneous before dinner  insulin lispro Injectable (HumaLOG) 12 Unit(s) SubCutaneous before lunch  insulin lispro Injectable (HumaLOG) 26 Unit(s) SubCutaneous before breakfast  metoprolol succinate  milliGRAM(s) Oral daily  pregabalin 100 milliGRAM(s) Oral two times a day    Medications (PRN):acetaminophen   Tablet .. 650 milliGRAM(s) Oral every 4 hours PRN  dextrose 40% Gel 15 Gram(s) Oral once PRN  glucagon  Injectable 1 milliGRAM(s) IntraMuscular once PRN  oxyCODONE    IR 5 milliGRAM(s) Oral two times a day PRN  sodium chloride 0.65% Nasal 1 Spray(s) Both Nostrils three times a day PRN    Allergies: No Known Allergies  (Intolerances: )  Social Hx:     Physical Exam  T(C): 36.9 (11-14-19 @ 04:38)  HR: 80 (11-14-19 @ 04:38) (80 - 105)  BP: 127/60 (11-14-19 @ 04:38) (100/60 - 127/60)  RR: 19 (11-14-19 @ 04:38) (17 - 19)  SpO2: 94% (11-14-19 @ 04:38) (91% - 96%)  Wt(kg): --  Tmax: T(C): , Max: 37 (11-13-19 @ 20:10)  Wt(kg): --    11-13-19  -  11-14-19  --------------------------------------------------------  IN:    Oral Fluid: 720 mL  Total IN: 720 mL    OUT:    Chest Tube: 120 mL    Voided: 1660 mL  Total OUT: 1780 mL    Total NET: -1060 mL      11-14-19  -  11-14-19  --------------------------------------------------------  IN:  Total IN: 0 mL    OUT:    Voided: 800 mL  Total OUT: 800 mL    Total NET: -800 mL        General: NAD  Neuro: alert and oriented  HEENT: NCAT, EOMI, anicteric, mucosa moist  Respiratory: airway patent, no accessory muscle use, spplemental oxygen, pigtail to CT on right side w/ SS output  Abdomen: soft, nontender, nondistended    Labs:                        15.2   11.60 )-----------( 234      ( 14 Nov 2019 08:40 )             50.3       11-14    137  |  89<L>  |  60<H>  ----------------------------<  124<H>  3.5   |  33<H>  |  2.17<H>    Ca    9.2      14 Nov 2019 06:24      Imaging and other studies:  < from: CT Chest No Cont (10.16.19 @ 20:54) >  IMPRESSION:    Right-sided hydropneumothorax with small amount of pleural air component   and moderate fluid component. Adjacent rounded atelectasis is noted in   the right lower lobe. Small left effusion with adjacent atelectasis.    < end of copied text >  < from: CT Chest No Cont (11.05.19 @ 14:42) >  IMPRESSION:     Interval increase in size of partially loculated moderate right pleural   effusion with compressive atelectasis of a large portion of the right   lower lobe with interval progression. No pneumothorax.    Evaluation of the right pleural surface is limited due to lack of   intravenous contrast.    < end of copied text >    < from: CT Chest No Cont (11.08.19 @ 19:22) >    IMPRESSION:   1.  Intervally decreased right-sided moderate-sized pleural effusion   status post thoracentesis.  2.  No pneumothorax.      < end of copied text >

## 2019-11-14 NOTE — PROGRESS NOTE ADULT - PROBLEM SELECTOR PLAN 1
- Check BG AC and HS  - Pt to receive additional 10 units of Humalog now as he did not receive pre-meal Humalog 26 units before breakfast  - Adjust pre-meal Humalog to 26-12-22 before meals  - Adjust Lantus to 35 units BID starting tonight   - C/w Humalog moderate correction scale AC meals and moderate scale HS  - Discussed with pt and team  - pager: [phone]/ [phone]

## 2019-11-14 NOTE — PROGRESS NOTE ADULT - ASSESSMENT
65 y/o M with PMH of CAD s/p stents, HFrEF (EF 40% 12/2018), DMT2, HTN, CKD, NSTEMI, Afib (on Pradaxa). Presents to ED with worsening SOB and productive cough with clear/whitish sputum for the past week. He was recently hospitalized last month and found to have small R hydropneumothorax of unclear etiology. Plans previous admission for drainage of effusion and sampling of fluid and analysis, but patient refused at that time, as he watned to go to his son's wedding. He did not present for outpt visit. CT chest with increase in R pl effusion. S/p R thoracentesis 11/8 with exudative effusion by Light's Criteria. s/p R CT placement now with R hydroPTX

## 2019-11-14 NOTE — PROGRESS NOTE ADULT - SUBJECTIVE AND OBJECTIVE BOX
Interventional Radiology Follow- Up Note      This is a 64y Male s/p right pigtail placement on 11/12 in Interventional Radiology with Dr. Geronimo.     Patient seen and examined @ bedside. No complaints offered.     Vitals: T(F): 98.5 (11-14-19 @ 12:16), Max: 98.6 (11-13-19 @ 20:10)  HR: 70 (11-14-19 @ 12:16) (70 - 105)  BP: 132/78 (11-14-19 @ 12:16) (100/60 - 132/78)  RR: 18 (11-14-19 @ 12:16) (18 - 19)  SpO2: 93% (11-14-19 @ 12:16) (91% - 94%)      LABS:                        15.2   11.60 )-----------( 234      ( 14 Nov 2019 08:40 )             50.3     11-14    137  |  89<L>  |  60<H>  ----------------------------<  124<H>  3.5   |  33<H>  |  2.17<H>    Ca    9.2      14 Nov 2019 06:24      Antibiotics: none    Cultures: no growth    24hr Drain output: 120cc    PHYSICAL EXAM:  General: Nontoxic, in NAD, A&O x3  Abdomen: soft, NTND  Extremities: no pedal edema or calf tenderness noted   Drain device: Drain intact attached to pleurevac, dressing clean, dry, intact    Impression: 63 yo male presents with shortness of breath, found to have right sided pleural effusion s/p thoracentesis 11/8 with reaccumulation, now s/p right chest pigtail catheter      Assessment/Plan:    -continue to monitor daily out put    -chest tube to wall suction  -keep dressing clean and dry  -continue care per primary team  -will discuss with IR attending     Please call IR at extension [phone] with any questions, concerns, or issues regarding above.        ANKIT Inman-BC  Spectra # [id number]

## 2019-11-14 NOTE — PROGRESS NOTE ADULT - ASSESSMENT
64 year old male with CKD Hypertension, Hyperlipidemia, Atrial Fibrillation, s/p DCCV, Coronary Artery Disease, NSTEMI, (2/2014), s/p PCI to mid LAD, Proximal LCx, Distal LCx (2/14, [facility], [facility]), Congestive Heart Failure,  Diabetes Mellitus, CVA, Severe Left Internal Carotid Disease, s/p CEA (2/2014) recently admitted for hydropneumothorax, a drainage of effusion and sampling of fluid and analysis was suggested however patient refused at that time.  Pt. presents  today with worsening SOB and cough with worsening pl. effusion         1- ckd III   2- proteinuria   3- chf   4- HTN   5- pleural effusion  s/p thoracentesis  6- a fib    creatinine steady   will need arb in near future for proteinuria   bumex 2 mg po qd   Metolazone 2.5 mg qd   cont toprol xl 100 mg qd  hyperuricemia and left knee pain likely acute gout attack vcolcrys 0.6 mg po x1

## 2019-11-14 NOTE — PROGRESS NOTE ADULT - PROBLEM SELECTOR PLAN 1
R hydroPTX likely 2nd trapped lung  -CT chest non-contrast to further evaluate  -No air leak from CT  -Thoracic surgery consult called  -c/w R CT to [facility]

## 2019-11-14 NOTE — PROGRESS NOTE ADULT - SUBJECTIVE AND OBJECTIVE BOX
CHIEF COMPLAINT:Patient is a 64y old  Male who presents with a chief complaint of SOB, pleural effusion (13 Nov 2019 23:06)    	        PAST MEDICAL & SURGICAL HISTORY:  Neuropathy  CAD (coronary artery disease)  MI (myocardial infarction): circa 2014  Atrial fibrillation  TIA (transient ischemic attack)  DM type 2 (diabetes mellitus, type 2)  HLD (hyperlipidemia)  HTN (hypertension), benign  S/P carotid endarterectomy: left  Status post angioplasty with stent: LULU x 3 2/7/2014          REVIEW OF SYSTEMS:  CONSTITUTIONAL: No fever, weight loss, or fatigue  EYES: No eye pain, visual disturbances, or discharge  NECK: No pain or stiffness  RESPIRATORY:  breathing better   CARDIOVASCULAR: No chest pain, palpitations, passing out, dizziness, or leg swelling  GASTROINTESTINAL: No abdominal or epigastric pain. No nausea, vomiting, or hematemesis; No diarrhea or constipation. No melena or hematochezia.  GENITOURINARY: No dysuria, frequency, hematuria, or incontinence  NEUROLOGICAL: No headaches, memory loss, loss of strength, numbness, or tremors  l knee pain improved     Medications:  MEDICATIONS  (STANDING):  aspirin enteric coated 81 milliGRAM(s) Oral daily  buMETAnide 2 milliGRAM(s) Oral daily  colchicine 1.2 milliGRAM(s) Oral once  dextrose 5%. 1000 milliLiter(s) (50 mL/Hr) IV Continuous <Continuous>  dextrose 50% Injectable 12.5 Gram(s) IV Push once  dextrose 50% Injectable 25 Gram(s) IV Push once  dextrose 50% Injectable 25 Gram(s) IV Push once  diltiazem    Tablet 30 milliGRAM(s) Oral three times a day  influenza   Vaccine 0.5 milliLiter(s) IntraMuscular once  insulin glargine Injectable (LANTUS) 35 Unit(s) SubCutaneous at bedtime  insulin lispro (HumaLOG) corrective regimen sliding scale   SubCutaneous at bedtime  insulin lispro (HumaLOG) corrective regimen sliding scale   SubCutaneous three times a day before meals  insulin lispro Injectable (HumaLOG) 22 Unit(s) SubCutaneous before dinner  insulin lispro Injectable (HumaLOG) 12 Unit(s) SubCutaneous before lunch  insulin lispro Injectable (HumaLOG) 26 Unit(s) SubCutaneous before breakfast  metoprolol succinate  milliGRAM(s) Oral daily  pregabalin 100 milliGRAM(s) Oral two times a day    MEDICATIONS  (PRN):  acetaminophen   Tablet .. 650 milliGRAM(s) Oral every 4 hours PRN Moderate Pain (4 - 6)  dextrose 40% Gel 15 Gram(s) Oral once PRN Blood Glucose LESS THAN 70 milliGRAM(s)/deciliter  glucagon  Injectable 1 milliGRAM(s) IntraMuscular once PRN Glucose LESS THAN 70 milligrams/deciliter  oxyCODONE    IR 5 milliGRAM(s) Oral two times a day PRN Severe Pain (7 - 10)  sodium chloride 0.65% Nasal 1 Spray(s) Both Nostrils three times a day PRN Nasal Congestion    	    PHYSICAL EXAM:  T(C): 36.9 (11-14-19 @ 04:38), Max: 37 (11-13-19 @ 20:10)  HR: 80 (11-14-19 @ 04:38) (80 - 105)  BP: 127/60 (11-14-19 @ 04:38) (100/60 - 127/60)  RR: 19 (11-14-19 @ 04:38) (17 - 19)  SpO2: 94% (11-14-19 @ 04:38) (91% - 96%)  Wt(kg): --  I&O's Summary    13 Nov 2019 07:01  -  14 Nov 2019 07:00  --------------------------------------------------------  IN: 720 mL / OUT: 1780 mL / NET: -1060 mL    14 Nov 2019 07:01  -  14 Nov 2019 11:39  --------------------------------------------------------  IN: 0 mL / OUT: 800 mL / NET: -800 mL        Appearance: Normal	  HEENT:   Normal oral mucosa, PERRL, EOMI	  Lymphatic: No lymphadenopathy  Cardiovascular: Normal S1 S2, No JVD  Respiratory: dec bs   Psychiatry: A & O x 3,   Gastrointestinal:  Soft, Non-tender, + BS	  Skin: No rashes, No ecchymoses, No cyanosis	  Neurologic: Non-focal  Extremities: l knee tenderness better   Vascular: Peripheral pulses palpable 2+ bilaterally    TELEMETRY: 	    ECG:  	  RADIOLOGY:  OTHER: 	  	  LABS:	 	    CARDIAC MARKERS:                                15.2   11.60 )-----------( 234      ( 14 Nov 2019 08:40 )             50.3     11-14    137  |  89<L>  |  60<H>  ----------------------------<  124<H>  3.5   |  33<H>  |  2.17<H>    Ca    9.2      14 Nov 2019 06:24      proBNP:   Lipid Profile:   HgA1c:   TSH:

## 2019-11-14 NOTE — PROGRESS NOTE ADULT - SUBJECTIVE AND OBJECTIVE BOX
Diabetes Follow up note:    Chief complaint: f/u T2DM    Interval Hx: Pt seen at bedside with NP Student. BG levels more controlled over last 24 hours, FBG this  mg/dl. Pt states that he is tolerating POs, did eat breakfast this morning but did not receive pre meal Humalog of 26 units. Pt requesting to receive AM Lantus closer to 11AM as this is when he takes it at home. Reports L knee pain r/t gout and would prefer to not receive steroids for treatment to avoid hyperglycemia.     Review of Systems:  General: reports feeling "not too bad" this AM but with L knee pain r/t gout  GI: Tolerating POs. Denies N/V/D/Abd pain   CV: Denies CP/SOB  MSK: L knee gout pain   ENDO: No S&Sx of hypoglycemia  MEDS:  colchicine 1.2 milliGRAM(s) Oral once  insulin glargine Injectable (LANTUS) 40 Unit(s) SubCutaneous at bedtime  insulin glargine Injectable (LANTUS) 40 Unit(s) SubCutaneous every morning  insulin lispro (HumaLOG) corrective regimen sliding scale   SubCutaneous at bedtime  insulin lispro (HumaLOG) corrective regimen sliding scale   SubCutaneous three times a day before meals  insulin lispro Injectable (HumaLOG) 22 Unit(s) SubCutaneous before dinner  insulin lispro Injectable (HumaLOG) 10 Unit(s) SubCutaneous before lunch  insulin lispro Injectable (HumaLOG) 10 Unit(s) SubCutaneous once  insulin lispro Injectable (HumaLOG) 26 Unit(s) SubCutaneous before breakfast      Allergies    No Known Allergies        PE:  General: male lying on side in bed  Vital Signs Last 24 Hrs  T(C): 36.9 (14 Nov 2019 04:38), Max: 37 (13 Nov 2019 20:10)  T(F): 98.5 (14 Nov 2019 04:38), Max: 98.6 (13 Nov 2019 20:10)  HR: 80 (14 Nov 2019 04:38) (80 - 105)  BP: 127/60 (14 Nov 2019 04:38) (100/60 - 127/60)  BP(mean): --  RR: 19 (14 Nov 2019 04:38) (17 - 19)  SpO2: 94% (14 Nov 2019 04:38) (91% - 96%)  HEENT: nasal cannula in place  Chest/Back: R upper back/thorax pigtail in place with serosanguinous drainage, dressing C/D/I   Abd: Soft, NT, ND  Extremities: Warm, trace B/L LE edema with chronic venous changes   Neuro: A&O X3    LABS:  POCT Blood Glucose.: 111 mg/dL (11-14-19 @ 07:48)  POCT Blood Glucose.: 158 mg/dL (11-13-19 @ 21:08)  POCT Blood Glucose.: 192 mg/dL (11-13-19 @ 16:41)  POCT Blood Glucose.: 158 mg/dL (11-13-19 @ 11:46)  POCT Blood Glucose.: 206 mg/dL (11-13-19 @ 07:43)  POCT Blood Glucose.: 264 mg/dL (11-12-19 @ 21:41)  POCT Blood Glucose.: 282 mg/dL (11-12-19 @ 16:43)  POCT Blood Glucose.: 240 mg/dL (11-12-19 @ 12:00)  POCT Blood Glucose.: 220 mg/dL (11-12-19 @ 07:49)  POCT Blood Glucose.: 177 mg/dL (11-11-19 @ 21:13)  POCT Blood Glucose.: 122 mg/dL (11-11-19 @ 16:32)  POCT Blood Glucose.: 261 mg/dL (11-11-19 @ 12:16)                            15.2   11.60 )-----------( 234      ( 14 Nov 2019 08:40 )             50.3       11-14    137  |  89<L>  |  60<H>  ----------------------------<  124<H>  3.5   |  33<H>  |  2.17<H>    Ca    9.2      14 Nov 2019 06:24        Hemoglobin A1C, Whole Blood: 11.7 % <H> [4.0 - 5.6] (11-07-19 @ 09:14)  Hemoglobin A1C, Whole Blood: 10.3 % <H> [4.0 - 5.6] (10-17-19 @ 07:43)          Contact number: joey [phone] or [phone]

## 2019-11-14 NOTE — PROGRESS NOTE ADULT - ASSESSMENT
63 y/o M with PMH of CAD s/p stents, HFrEF (EF 40% 12/2018), DMT2, HTN, CKD, NSTEMI, Afib (on Pradaxa). Presents to ED with worsening SOB and productive cough with .   pt  recently hospitalized last month and found to have small R hydropneumothorax of unclear etiology. Plans previous admission for drainage of effusion and sampling of fluid and analysis, but patient refused at that time and agreed for outpt f/u. presents now w/ sob       Problem: Hydropneumothorax.   CT from previous admission with R sided hydropneumothorax of unclear etiology.  -Pt refused drainage at that time.  s/p thorocentesis/c/w exudate  s/p pigtail cath   c/w drainage  pulm f/u noted   thorc sx eval       ·  Problem: CHF (congestive heart failure).  Recommendation: -Keep O>I as tolerated.   c/w current meds  cards eval noted    dm /uncontrolled  endo eval noted  fsg riss  c/w insulin regimen as per endo        dm neuropathy  c/w lyrica  oxycodone for severe pain     alex/ ckd  renal f/u   c/w recs as per renal     l knee pain   gout likely   discussed w/ renal  colcrys added  uric acid noted

## 2019-11-14 NOTE — PROGRESS NOTE ADULT - ASSESSMENT
63 y/o male with hx of uncontrolled T2DM c/b neuropathy, CKD3, macrovascular disease (CAD, TIA, left carotid artery stenosis), hypertension, hyperlipidemia, atrial Fibrillation on Pradaxa, CHF presenting with SOB and cough secondary to right hydropneumothorax. Endocrine consult requested for management of hyperglycemia in patient with DM2 A1c >10%. Did not receive pre meal Humalog before breakfast despite eating. Pt to receive additional dose of Humalog 10 units to prevent hyperglycemia before lunch. BG values mostly at goal on current regimen, FBG this  mg/dl after receiving Lantus 40 units BID. Will adjust Lantus to prevent AM low blood sugars. BG goal (100-180 mg/dl).

## 2019-11-14 NOTE — CONSULT NOTE ADULT - ASSESSMENT
ASSESSMENT:    PLAN:  -full note to follow    Thoracic Surgery   [id number] ASSESSMENT: 64 year old male with HTN, HLD, Afib on pradaxa, s/p DCCV, Coronary Artery Disease, NSTEMI, (2/2014), s/p PCI to mid LAD, Proximal LCx, Distal LCx (2/14, [facility], LULU), CHF w/ preserved EF, DM2, CVA, L carotid stenosis, s/p CEA (2/2014) p/w with increased SOB and cough s/p thoracentesis and pigtail placement by IR w/ persistent hydropneumothorax     PLAN:  -full note to follow    Thoracic Surgery   [id number] ASSESSMENT: 64 year old male with HTN, HLD, Afib on pradaxa, s/p DCCV, Coronary Artery Disease, NSTEMI, (2/2014), s/p PCI to mid LAD, Proximal LCx, Distal LCx (2/14, [facility], LULU), CHF w/ preserved EF, DM2, CVA, L carotid stenosis, s/p CEA (2/2014) p/w with increased SOB and cough s/p thoracentesis and pigtail placement by IR now w/ trapped lung    PLAN:  - f/u CT scan   - Plan  for surgical Intervention pending     Thoracic Surgery   [id number] ASSESSMENT: 64 year old male with HTN, HLD, Afib on pradaxa, s/p DCCV, Coronary Artery Disease, NSTEMI, (2/2014), s/p PCI to mid LAD, Proximal LCx, Distal LCx (2/14, [facility], LULU), CHF w/ preserved EF, DM2, CVA, L carotid stenosis, s/p CEA (2/2014) p/w with increased SOB and cough s/p thoracentesis and pigtail placement by IR now w/ trapped lung    PLAN:  - f/u CT scan   - Evaluation of surgical options pending CT results  - Discussed w/ Dr. Camargo     Thoracic Surgery   [id number] ASSESSMENT: 64 year old male with HTN, HLD, Afib on pradaxa, s/p DCCV, Coronary Artery Disease, NSTEMI, (2/2014), s/p PCI to mid LAD, Proximal LCx, Distal LCx (2/14, [facility], LULU), CHF w/ preserved EF, DM2, CVA, L carotid stenosis, s/p CEA (2/2014) p/w with increased SOB and cough s/p thoracentesis and pigtail placement by IR now w/ trapped lung    PLAN:  - f/u CT scan   - Evaluation of surgical options pending CT results  - Discussed w/ Dr. Camargo     Thoracic Surgery   [id number]

## 2019-11-14 NOTE — PROGRESS NOTE ADULT - SUBJECTIVE AND OBJECTIVE BOX
INTERVAL HPI/OVERNIGHT EVENTS:  Knee somewhat improved though still with pain.  Feels better with ACE wrap    MEDICATIONS  (STANDING):  aspirin enteric coated 81 milliGRAM(s) Oral daily  buMETAnide 2 milliGRAM(s) Oral daily  dextrose 5%. 1000 milliLiter(s) (50 mL/Hr) IV Continuous <Continuous>  dextrose 50% Injectable 12.5 Gram(s) IV Push once  dextrose 50% Injectable 25 Gram(s) IV Push once  dextrose 50% Injectable 25 Gram(s) IV Push once  diltiazem    Tablet 30 milliGRAM(s) Oral three times a day  influenza   Vaccine 0.5 milliLiter(s) IntraMuscular once  insulin glargine Injectable (LANTUS) 35 Unit(s) SubCutaneous at bedtime  insulin lispro (HumaLOG) corrective regimen sliding scale   SubCutaneous at bedtime  insulin lispro (HumaLOG) corrective regimen sliding scale   SubCutaneous three times a day before meals  insulin lispro Injectable (HumaLOG) 22 Unit(s) SubCutaneous before dinner  insulin lispro Injectable (HumaLOG) 12 Unit(s) SubCutaneous before lunch  metoprolol succinate  milliGRAM(s) Oral daily  pregabalin 100 milliGRAM(s) Oral two times a day    MEDICATIONS  (PRN):  acetaminophen   Tablet .. 650 milliGRAM(s) Oral every 4 hours PRN Moderate Pain (4 - 6)  dextrose 40% Gel 15 Gram(s) Oral once PRN Blood Glucose LESS THAN 70 milliGRAM(s)/deciliter  glucagon  Injectable 1 milliGRAM(s) IntraMuscular once PRN Glucose LESS THAN 70 milligrams/deciliter  oxyCODONE    IR 5 milliGRAM(s) Oral two times a day PRN Severe Pain (7 - 10)  sodium chloride 0.65% Nasal 1 Spray(s) Both Nostrils three times a day PRN Nasal Congestion      Allergies    No Known Allergies    Intolerances        REVIEW OF SYSTEMS    General:  somewhat improved  	      Vital Signs Last 24 Hrs  T(C): 36.9 (14 Nov 2019 12:16), Max: 37 (13 Nov 2019 20:10)  T(F): 98.5 (14 Nov 2019 12:16), Max: 98.6 (13 Nov 2019 20:10)  HR: 70 (14 Nov 2019 12:16) (70 - 103)  BP: 132/78 (14 Nov 2019 12:16) (106/65 - 132/78)  BP(mean): --  RR: 18 (14 Nov 2019 12:16) (18 - 19)  SpO2: 93% (14 Nov 2019 12:16) (93% - 94%)      PHYSICAL EXAM:    Constitutional:  fair appearing    Neck:  supple    Extremities:  chronic changes      Musculoskeletal:  L knee; improved ROM; still with pain on flexion past 40*  Toes non tender    Psychiatric:  in good Spirits, alert      LABS:                        15.2   11.60 )-----------( 234      ( 14 Nov 2019 08:40 )             50.3     11-14    137  |  89<L>  |  60<H>  ----------------------------<  124<H>  3.5   |  33<H>  |  2.17<H>    Ca    9.2      14 Nov 2019 06:24            RADIOLOGY & ADDITIONAL TESTS:

## 2019-11-14 NOTE — PROGRESS NOTE ADULT - SUBJECTIVE AND OBJECTIVE BOX
[facility]   [phone]  RENAL FOLLOW UP NOTE  --------------------------------------------------------------------------------  Chief Complaint:    24 hour events/subjective:    seen earlier. has left knee pain  states pain improving after colcrys. does not want steroids     PAST HISTORY  --------------------------------------------------------------------------------  No significant changes to PMH, PSH, FHx, SHx, unless otherwise noted    ALLERGIES & MEDICATIONS  --------------------------------------------------------------------------------  Allergies    No Known Allergies    Intolerances      Standing Inpatient Medications  aspirin enteric coated 81 milliGRAM(s) Oral daily  buMETAnide 2 milliGRAM(s) Oral daily  dextrose 5%. 1000 milliLiter(s) IV Continuous <Continuous>  dextrose 50% Injectable 12.5 Gram(s) IV Push once  dextrose 50% Injectable 25 Gram(s) IV Push once  dextrose 50% Injectable 25 Gram(s) IV Push once  diltiazem    Tablet 30 milliGRAM(s) Oral three times a day  influenza   Vaccine 0.5 milliLiter(s) IntraMuscular once  insulin glargine Injectable (LANTUS) 35 Unit(s) SubCutaneous at bedtime  insulin lispro (HumaLOG) corrective regimen sliding scale   SubCutaneous at bedtime  insulin lispro (HumaLOG) corrective regimen sliding scale   SubCutaneous three times a day before meals  insulin lispro Injectable (HumaLOG) 22 Unit(s) SubCutaneous before dinner  insulin lispro Injectable (HumaLOG) 12 Unit(s) SubCutaneous before lunch  metoprolol succinate  milliGRAM(s) Oral daily  pregabalin 100 milliGRAM(s) Oral two times a day    PRN Inpatient Medications  acetaminophen   Tablet .. 650 milliGRAM(s) Oral every 4 hours PRN  dextrose 40% Gel 15 Gram(s) Oral once PRN  glucagon  Injectable 1 milliGRAM(s) IntraMuscular once PRN  oxyCODONE    IR 5 milliGRAM(s) Oral two times a day PRN  sodium chloride 0.65% Nasal 1 Spray(s) Both Nostrils three times a day PRN      REVIEW OF SYSTEMS  --------------------------------------------------------------------------------    Gen: denies fevers/chills,  CVS: denies chest pain/palpitations  Resp: denies SOB/Cough  GI: Denies N/V/Abd pain  : Denies dysuria/oliguria/hematuria    All other systems were reviewed and are negative, except as noted.    VITALS/PHYSICAL EXAM  --------------------------------------------------------------------------------  T(C): 37.6 (11-14-19 @ 20:18), Max: 37.6 (11-14-19 @ 20:18)  HR: 92 (11-14-19 @ 20:18) (70 - 103)  BP: 144/74 (11-14-19 @ 20:18) (106/68 - 144/74)  RR: 19 (11-14-19 @ 20:18) (18 - 19)  SpO2: 91% (11-14-19 @ 20:18) (91% - 94%)  Wt(kg): --        11-13-19 @ 07:01  -  11-14-19 @ 07:00  --------------------------------------------------------  IN: 720 mL / OUT: 1780 mL / NET: -1060 mL    11-14-19 @ 07:01  -  11-14-19 @ 21:25  --------------------------------------------------------  IN: 320 mL / OUT: 1625 mL / NET: -1305 mL      Physical Exam:  	  Gen:  + obese appears with mildly tachypneic  on O2    	no jvd   	Pulm: decrease bs  R base upto 3/4  no rales or ronchi or wheezing + pigtail cath right   	CV: RRR, S1S2; no rub  	Abd: +BS, soft, nontender/nondistended  	: No suprapubic tenderness  	UE: Warm, no cyanosis  no clubbing,  no edema  	LE: Warm, no cyanosis  no clubbing, no edema  left knee no erythema or induration but tenderness over both medial and lateral meniscus area   	    LABS/STUDIES  --------------------------------------------------------------------------------              15.2   11.60 >-----------<  234      [11-14-19 @ 08:40]              50.3     137  |  89  |  60  ----------------------------<  124      [11-14-19 @ 06:24]  3.5   |  33  |  2.17        Ca     9.2     [11-14-19 @ 06:24]          Uric acid 11.4      [11-14-19 @ 06:24]    Creatinine Trend:  SCr 2.17 [11-14 @ 06:24]  SCr 1.79 [11-13 @ 06:43]  SCr 1.78 [11-12 @ 06:22]  SCr 1.74 [11-11 @ 11:47]  SCr 1.98 [11-10 @ 06:10]              Urinalysis - [10-17-19 @ 04:40]      Color Light Yellow / Appearance Clear / SG 1.015 / pH 6.5      Gluc 500 mg/dL / Ketone Negative  / Bili Negative / Urobili Negative       Blood Small / Protein 300 mg/dL / Leuk Est Negative / Nitrite Negative      RBC 8 / WBC 3 / Hyaline 0 / Gran  / Sq Epi  / Non Sq Epi 0 / Bacteria Negative      HbA1c 11.7      [11-07-19 @ 09:14]  TSH 1.71      [12-28-18 @ 14:58]

## 2019-11-14 NOTE — PROGRESS NOTE ADULT - SUBJECTIVE AND OBJECTIVE BOX
Follow-up Pulm Progress Note    No new respiratory events overnight.  Denies SOB/CP.   c/o L knee pain  95% on 2L NC  -120cc from R CT        Medications:  MEDICATIONS  (STANDING):  aspirin enteric coated 81 milliGRAM(s) Oral daily  buMETAnide 2 milliGRAM(s) Oral daily  colchicine 1.2 milliGRAM(s) Oral once  dextrose 5%. 1000 milliLiter(s) (50 mL/Hr) IV Continuous <Continuous>  dextrose 50% Injectable 12.5 Gram(s) IV Push once  dextrose 50% Injectable 25 Gram(s) IV Push once  dextrose 50% Injectable 25 Gram(s) IV Push once  diltiazem    Tablet 30 milliGRAM(s) Oral three times a day  influenza   Vaccine 0.5 milliLiter(s) IntraMuscular once  insulin glargine Injectable (LANTUS) 35 Unit(s) SubCutaneous at bedtime  insulin lispro (HumaLOG) corrective regimen sliding scale   SubCutaneous at bedtime  insulin lispro (HumaLOG) corrective regimen sliding scale   SubCutaneous three times a day before meals  insulin lispro Injectable (HumaLOG) 22 Unit(s) SubCutaneous before dinner  insulin lispro Injectable (HumaLOG) 12 Unit(s) SubCutaneous before lunch  insulin lispro Injectable (HumaLOG) 26 Unit(s) SubCutaneous before breakfast  metoprolol succinate  milliGRAM(s) Oral daily  pregabalin 100 milliGRAM(s) Oral two times a day    MEDICATIONS  (PRN):  acetaminophen   Tablet .. 650 milliGRAM(s) Oral every 4 hours PRN Moderate Pain (4 - 6)  dextrose 40% Gel 15 Gram(s) Oral once PRN Blood Glucose LESS THAN 70 milliGRAM(s)/deciliter  glucagon  Injectable 1 milliGRAM(s) IntraMuscular once PRN Glucose LESS THAN 70 milligrams/deciliter  oxyCODONE    IR 5 milliGRAM(s) Oral two times a day PRN Severe Pain (7 - 10)  sodium chloride 0.65% Nasal 1 Spray(s) Both Nostrils three times a day PRN Nasal Congestion          Vital Signs Last 24 Hrs  T(C): 36.9 (14 Nov 2019 04:38), Max: 37 (13 Nov 2019 20:10)  T(F): 98.5 (14 Nov 2019 04:38), Max: 98.6 (13 Nov 2019 20:10)  HR: 80 (14 Nov 2019 04:38) (80 - 105)  BP: 127/60 (14 Nov 2019 04:38) (100/60 - 127/60)  BP(mean): --  RR: 19 (14 Nov 2019 04:38) (17 - 19)  SpO2: 94% (14 Nov 2019 04:38) (91% - 96%) on RA          11-13 @ 07:01  -  11-14 @ 07:00  --------------------------------------------------------  IN: 720 mL / OUT: 1780 mL / NET: -1060 mL          LABS:                        15.2   11.60 )-----------( 234      ( 14 Nov 2019 08:40 )             50.3     11-14    137  |  89<L>  |  60<H>  ----------------------------<  124<H>  3.5   |  33<H>  |  2.17<H>    Ca    9.2      14 Nov 2019 06:24            CAPILLARY BLOOD GLUCOSE      POCT Blood Glucose.: 111 mg/dL (14 Nov 2019 07:48)                    Fluid characteristics  PLEURAL 11-08 @ 18:05  pH 7.74    tprot 3.9    Cell count  Appearance Sl Bloody  Fluid type --  BF lymph 77  color Red  eosinophil 2  PMN 11  Mesothelial 2  Monocyte 8  Other body cells --      CULTURES: (if applicable)  Culture Results:   No growth at 5 days (11-08 @ 22:02)          Physical Examination:  PULM: Decreased BS R base  CVS: S1, S2 heard    RADIOLOGY REVIEWED  CXR: R hydroPTX

## 2019-11-14 NOTE — PROGRESS NOTE ADULT - ASSESSMENT
Imp;  Acute Gout of L knee; slow improvement on colchicine    Rec:  Repeat Colchicine 1.2mg in AM  Pt continues to refuse steroids  Ace wrap as needed  Advised to try and move knee as tolerated

## 2019-11-14 NOTE — PROGRESS NOTE ADULT - ASSESSMENT
64 year old male with Hypertension, Hyperlipidemia, Atrial Fibrillation, s/p DCCV, Coronary Artery Disease, NSTEMI, (2/2014), s/p PCI to mid LAD, Proximal LCx, Distal LCx (2/14, [facility], [facility]), Congestive Heart Failure, Normal Ejection Fraction, Diabetes Mellitus, CVA, Severe Left Internal Carotid Disease, s/p CEA (2/2014) presenting with worsening SOB, cough.     1.Right-sided Pleural effusion, Hydropneumothorax  -s/p right pleural pigtail catheter, cont drainage-serosanguinous fluid   -s/p right thoracentesis 11/8/0219, bloody exudate  -cyto negative for malignant cells  -pulmo/thoracic f/u for further management;  s/p R pigtail    2. Chronic Congestive heart failure, Diastolic.   -stable,   -continue bumex, Hold metol , creat elevated  -recent echo with no evidence of pericardial effusion, mod LAE, grossly borderline LV sys dysfx EF 45% likely underestimated due to AF     3. Atrial Fibrillation, chronic   -rates improving today   -c/w metoprolol succinate ER 100mg daily  -c/w cardizem 30 mg PO TID   -s/p pigtail placement, Resume a/c once cleared by Pulm and IR     4. Coronary Artery Disease. S/P PCI to LAD, LCx.  -stable, Continue ASA 81mg and statin    5. Acute gout , elevated uric acid  rheum f/u    dvt ppx 64 year old male with Hypertension, Hyperlipidemia, Atrial Fibrillation, s/p DCCV, Coronary Artery Disease, NSTEMI, (2/2014), s/p PCI to mid LAD, Proximal LCx, Distal LCx (2/14, [facility], [facility]), Congestive Heart Failure, Normal Ejection Fraction, Diabetes Mellitus, CVA, Severe Left Internal Carotid Disease, s/p CEA (2/2014) presenting with worsening SOB, cough.     1.Right-sided Pleural effusion, Hydropneumothorax  -s/p right pleural pigtail catheter, cont drainage-serosanguinous fluid   -s/p right thoracentesis 11/8/0219, bloody exudate  -cyto negative for malignant cells  -pulmo/thoracic f/u for further management;  s/p R pigtail  - chest xray 11/13 small right hydropneumothorax. pending  CT eval    2. Chronic Congestive heart failure, Diastolic.   -stable,   -continue bumex, Hold metol , creat elevated  -recent echo with no evidence of pericardial effusion, mod LAE, grossly borderline LV sys dysfx EF 45% likely underestimated due to AF     3. Atrial Fibrillation, chronic   -rates improving today   -c/w metoprolol succinate ER 100mg daily  -c/w cardizem 30 mg PO TID   -s/p pigtail placement, Resume a/c once cleared by Pulm and IR     4. Coronary Artery Disease. S/P PCI to LAD, LCx.  -stable, Continue ASA 81mg and statin    5. Acute gout , elevated uric acid  rheum f/u    dvt ppx

## 2019-11-14 NOTE — PROGRESS NOTE ADULT - SUBJECTIVE AND OBJECTIVE BOX
CARDIOLOGY FOLLOW UP - Dr. Mazariegos    CC no cp or sob       PHYSICAL EXAM:  T(C): 36.9 (11-14-19 @ 04:38), Max: 37 (11-13-19 @ 20:10)  HR: 80 (11-14-19 @ 04:38) (80 - 105)  BP: 127/60 (11-14-19 @ 04:38) (100/60 - 127/60)  RR: 19 (11-14-19 @ 04:38) (17 - 19)  SpO2: 94% (11-14-19 @ 04:38) (91% - 96%)  Wt(kg): --  I&O's Summary    13 Nov 2019 07:01  -  14 Nov 2019 07:00  --------------------------------------------------------  IN: 720 mL / OUT: 1780 mL / NET: -1060 mL    14 Nov 2019 07:01  -  14 Nov 2019 11:57  --------------------------------------------------------  IN: 0 mL / OUT: 800 mL / NET: -800 mL        Appearance: Normal	  Cardiovascular: Normal S1 S2, irregular   Respiratory: diminished R CT to LWS  Gastrointestinal:  Soft, Non-tender, + BS	  Extremities: Normal range of motion, No clubbing, cyanosis or edema        MEDICATIONS  (STANDING):  aspirin enteric coated 81 milliGRAM(s) Oral daily  buMETAnide 2 milliGRAM(s) Oral daily  colchicine 1.2 milliGRAM(s) Oral once  dextrose 5%. 1000 milliLiter(s) (50 mL/Hr) IV Continuous <Continuous>  dextrose 50% Injectable 12.5 Gram(s) IV Push once  dextrose 50% Injectable 25 Gram(s) IV Push once  dextrose 50% Injectable 25 Gram(s) IV Push once  diltiazem    Tablet 30 milliGRAM(s) Oral three times a day  influenza   Vaccine 0.5 milliLiter(s) IntraMuscular once  insulin glargine Injectable (LANTUS) 35 Unit(s) SubCutaneous at bedtime  insulin lispro (HumaLOG) corrective regimen sliding scale   SubCutaneous at bedtime  insulin lispro (HumaLOG) corrective regimen sliding scale   SubCutaneous three times a day before meals  insulin lispro Injectable (HumaLOG) 22 Unit(s) SubCutaneous before dinner  insulin lispro Injectable (HumaLOG) 12 Unit(s) SubCutaneous before lunch  insulin lispro Injectable (HumaLOG) 26 Unit(s) SubCutaneous before breakfast  metoprolol succinate  milliGRAM(s) Oral daily  pregabalin 100 milliGRAM(s) Oral two times a day      TELEMETRY: afib hr   brief HR up to 160s	    ECG:  	  RADIOLOGY:   DIAGNOSTIC TESTING:  [ ] Echocardiogram:  [ ]  Catheterization:  [ ] Stress Test:    OTHER: 	    LABS:	 	                            15.2   11.60 )-----------( 234      ( 14 Nov 2019 08:40 )             50.3     11-14    137  |  89<L>  |  60<H>  ----------------------------<  124<H>  3.5   |  33<H>  |  2.17<H>    Ca    9.2      14 Nov 2019 06:24

## 2019-11-15 ENCOUNTER — TRANSCRIPTION ENCOUNTER (OUTPATIENT)
Age: 64
End: 2019-11-15

## 2019-11-15 LAB
ANION GAP SERPL CALC-SCNC: 16 MMOL/L — SIGNIFICANT CHANGE UP (ref 5–17)
BUN SERPL-MCNC: 61 MG/DL — HIGH (ref 7–23)
CALCIUM SERPL-MCNC: 9 MG/DL — SIGNIFICANT CHANGE UP (ref 8.4–10.5)
CHLORIDE SERPL-SCNC: 89 MMOL/L — LOW (ref 96–108)
CO2 SERPL-SCNC: 31 MMOL/L — SIGNIFICANT CHANGE UP (ref 22–31)
CREAT SERPL-MCNC: 2.08 MG/DL — HIGH (ref 0.5–1.3)
GLUCOSE BLDC GLUCOMTR-MCNC: 109 MG/DL — HIGH (ref 70–99)
GLUCOSE BLDC GLUCOMTR-MCNC: 115 MG/DL — HIGH (ref 70–99)
GLUCOSE BLDC GLUCOMTR-MCNC: 134 MG/DL — HIGH (ref 70–99)
GLUCOSE BLDC GLUCOMTR-MCNC: 135 MG/DL — HIGH (ref 70–99)
GLUCOSE SERPL-MCNC: 158 MG/DL — HIGH (ref 70–99)
HCT VFR BLD CALC: 46.1 % — SIGNIFICANT CHANGE UP (ref 39–50)
HGB BLD-MCNC: 13.9 G/DL — SIGNIFICANT CHANGE UP (ref 13–17)
MCHC RBC-ENTMCNC: 25 PG — LOW (ref 27–34)
MCHC RBC-ENTMCNC: 30.2 GM/DL — LOW (ref 32–36)
MCV RBC AUTO: 83.1 FL — SIGNIFICANT CHANGE UP (ref 80–100)
NRBC # BLD: 0 /100 WBCS — SIGNIFICANT CHANGE UP (ref 0–0)
PLATELET # BLD AUTO: 252 K/UL — SIGNIFICANT CHANGE UP (ref 150–400)
POTASSIUM SERPL-MCNC: 3.5 MMOL/L — SIGNIFICANT CHANGE UP (ref 3.5–5.3)
POTASSIUM SERPL-SCNC: 3.5 MMOL/L — SIGNIFICANT CHANGE UP (ref 3.5–5.3)
RBC # BLD: 5.55 M/UL — SIGNIFICANT CHANGE UP (ref 4.2–5.8)
RBC # FLD: 15.7 % — HIGH (ref 10.3–14.5)
SODIUM SERPL-SCNC: 136 MMOL/L — SIGNIFICANT CHANGE UP (ref 135–145)
URATE SERPL-MCNC: 12 MG/DL — HIGH (ref 3.4–8.8)
WBC # BLD: 9.82 K/UL — SIGNIFICANT CHANGE UP (ref 3.8–10.5)
WBC # FLD AUTO: 9.82 K/UL — SIGNIFICANT CHANGE UP (ref 3.8–10.5)

## 2019-11-15 PROCEDURE — 99232 SBSQ HOSP IP/OBS MODERATE 35: CPT

## 2019-11-15 PROCEDURE — 71045 X-RAY EXAM CHEST 1 VIEW: CPT | Mod: 26

## 2019-11-15 PROCEDURE — 99231 SBSQ HOSP IP/OBS SF/LOW 25: CPT

## 2019-11-15 PROCEDURE — 32552 REMOVE LUNG CATHETER: CPT | Mod: AS,RT

## 2019-11-15 RX ORDER — ENOXAPARIN SODIUM 100 MG/ML
130 INJECTION SUBCUTANEOUS
Refills: 0 | Status: DISCONTINUED | OUTPATIENT
Start: 2019-11-16 | End: 2019-11-17

## 2019-11-15 RX ORDER — SENNA PLUS 8.6 MG/1
2 TABLET ORAL AT BEDTIME
Refills: 0 | Status: DISCONTINUED | OUTPATIENT
Start: 2019-11-15 | End: 2019-11-17

## 2019-11-15 RX ORDER — COLCHICINE 0.6 MG
0.6 TABLET ORAL DAILY
Refills: 0 | Status: DISCONTINUED | OUTPATIENT
Start: 2019-11-16 | End: 2019-11-17

## 2019-11-15 RX ORDER — POLYETHYLENE GLYCOL 3350 17 G/17G
17 POWDER, FOR SOLUTION ORAL ONCE
Refills: 0 | Status: COMPLETED | OUTPATIENT
Start: 2019-11-15 | End: 2019-11-15

## 2019-11-15 RX ADMIN — Medication 30 MILLIGRAM(S): at 06:15

## 2019-11-15 RX ADMIN — OXYCODONE HYDROCHLORIDE 5 MILLIGRAM(S): 5 TABLET ORAL at 13:24

## 2019-11-15 RX ADMIN — Medication 22 UNIT(S): at 17:20

## 2019-11-15 RX ADMIN — Medication 100 MILLIGRAM(S): at 06:15

## 2019-11-15 RX ADMIN — SENNA PLUS 2 TABLET(S): 8.6 TABLET ORAL at 22:03

## 2019-11-15 RX ADMIN — INSULIN GLARGINE 35 UNIT(S): 100 INJECTION, SOLUTION SUBCUTANEOUS at 08:00

## 2019-11-15 RX ADMIN — Medication 100 MILLIGRAM(S): at 17:20

## 2019-11-15 RX ADMIN — Medication 81 MILLIGRAM(S): at 11:23

## 2019-11-15 RX ADMIN — POLYETHYLENE GLYCOL 3350 17 GRAM(S): 17 POWDER, FOR SOLUTION ORAL at 17:27

## 2019-11-15 RX ADMIN — Medication 1.2 MILLIGRAM(S): at 09:25

## 2019-11-15 RX ADMIN — OXYCODONE HYDROCHLORIDE 5 MILLIGRAM(S): 5 TABLET ORAL at 12:24

## 2019-11-15 RX ADMIN — BUMETANIDE 2 MILLIGRAM(S): 0.25 INJECTION INTRAMUSCULAR; INTRAVENOUS at 06:15

## 2019-11-15 RX ADMIN — Medication 12 UNIT(S): at 12:02

## 2019-11-15 RX ADMIN — INSULIN GLARGINE 35 UNIT(S): 100 INJECTION, SOLUTION SUBCUTANEOUS at 22:02

## 2019-11-15 RX ADMIN — Medication 30 MILLIGRAM(S): at 22:05

## 2019-11-15 RX ADMIN — Medication 30 MILLIGRAM(S): at 13:31

## 2019-11-15 RX ADMIN — Medication 20 UNIT(S): at 08:00

## 2019-11-15 NOTE — DISCHARGE NOTE PROVIDER - NSDCCPCAREPLAN_GEN_ALL_CORE_FT
PRINCIPAL DISCHARGE DIAGNOSIS  Diagnosis: Pleural effusion, right  Assessment and Plan of Treatment: s/p chest tube.  You are being transferred to [facility] for a Left VATs with decortication with Dr Camargo.      SECONDARY DISCHARGE DIAGNOSES  Diagnosis: Chronic kidney disease (CKD)  Assessment and Plan of Treatment: Avoid taking (NSAIDs) - (ex: Ibuprofen, Advil, Celebrex, Naprosyn)  Avoid taking any nephrotoxic agents (can harm kidneys) - Intravenous contrast for diagnostic testing, combination cold medications.  Have all medications adjusted for your renal function by your Health Care Provider.  Blood pressure control is important.  Take all medication as prescribed.      Diagnosis: Diabetes type 2, uncontrolled  Assessment and Plan of Treatment: Make sure you get your HgA1c checked every three months.  If you take oral diabetes medications, check your blood glucose two times a day.  If you take insulin, check your blood glucose before meals and at bedtime.  It's important not to skip any meals.  Keep a log of your blood glucose results and always take it with you to your doctor appointments.  Keep a list of your current medications including injectables and over the counter medications and bring this medication list with you to all your doctor appointments.  If you have not seen your ophthalmologist this year call for appointment.  Check your feet daily for redness, sores, or openings. Do not self treat. If no improvement in two days call your primary care physician for an appointment.  Low blood sugar (hypoglycemia) is a blood sugar below 70mg/dl. Check your blood sugar if you feel signs/symptoms of hypoglycemia. If your blood sugar is below 70 take 15 grams of carbohydrates (ex 4 oz of apple juice, 3-4 glucose tablets, or 4-6 oz of regular soda) wait 15 minutes and repeat blood sugar to make sure it comes up above 70.  If your blood sugar is above 70 and you are due for a meal, have a meal.  If you are not due for a meal have a snack.  This snack helps keeps your blood sugar at a safe range.      Diagnosis: Gout  Assessment and Plan of Treatment:     Diagnosis: CAD (coronary artery disease)  Assessment and Plan of Treatment: Coronary artery disease is a condition where the arteries the supply the heart muscle get clogges with fatty deposits & puts you at risk for a heart attack  Call your doctor if you have any new pain, pressure, or discomfort in the center of your chest, pain, tingling or discomfort in arms, back, neck, jaw, or stomach, shortness of breath, nausea, vomiting, burping or heartburn, sweating, cold and clammy skin, racing or abnormal heartbeat for more than 10 minutes or if they keep coming & going.  Call 911 and do not tr to get to hospital by care  You can help yourself with lefestyle changes (quitting smoking if you Informed pt of the various negative side effects of smoking including risk of COPD, Lung Ca etc  Strongly recommended that pt stops smoking and pt given various options of smoking cessasion tools such as NRT's and other pharmacotherapies), eat lots of fruits & vegetables & low fat dairy products, not a lot of meat & fatty foods, walk or some form of physical activity most days of the week, lose weight if you are overweight  Take your cardiac medication as prescribed to lower cholesterol, to lower blood pressure, aspirin to prevent blood clots, and diabetes control  Make sure to keep appointments with doctor for cardiac follow up care      Diagnosis: Atrial fibrillation  Assessment and Plan of Treatment: Atrial fibrillation is the most common heart rhythm problem & has the risk of stroke & heart attack  It helps if you control your blood pressure, not drink more than 1-2 alcohol drinks per day, cut down on caffeine, getting treatment for over active thyroid gland, & getting exercise  Call your doctor if you feel your heart racing or beating unusually, chest tightness or pain, lightheaded, faint, shortness of breath especially with exercise  It is important to take your heart medication as prescribed  You may be on anticoagulation which is very important to take as directed - you may need blood work to monitor drug levels      Diagnosis: Chronic diastolic (congestive) heart failure  Assessment and Plan of Treatment: Weigh yourself daily.  If you gain 3lbs in 3 days, or 5lbs in a week call your Health Care Provider.  Do not eat or drink foods containing more than 2000mg of salt (sodium) in your diet every day.  Call your Health Care Provider if you have any swelling or increased swelling in your feet, ankles, and/or stomach.  The Pt was provided with CHF diet instruction (low sodium diet, daily weights, label reading, Heart Healthy Cooking Tips & Heart Healthy shopping Tips).  Take all of your medication as directed.  If you become dizzy call your Health Care Provider. PRINCIPAL DISCHARGE DIAGNOSIS  Diagnosis: Pleural effusion, right  Assessment and Plan of Treatment: s/p chest tube.  You are being transferred to [facility] for a Left VATs with decortication with Dr Camargo.      SECONDARY DISCHARGE DIAGNOSES  Diagnosis: Chronic kidney disease (CKD)  Assessment and Plan of Treatment: Avoid taking (NSAIDs) - (ex: Ibuprofen, Advil, Celebrex, Naprosyn)  Avoid taking any nephrotoxic agents (can harm kidneys) - Intravenous contrast for diagnostic testing, combination cold medications.  Have all medications adjusted for your renal function by your Health Care Provider.  Blood pressure control is important.  Take all medication as prescribed.      Diagnosis: Diabetes type 2, uncontrolled  Assessment and Plan of Treatment: Make sure you get your HgA1c checked every three months.  If you take oral diabetes medications, check your blood glucose two times a day.  If you take insulin, check your blood glucose before meals and at bedtime.  It's important not to skip any meals.  Keep a log of your blood glucose results and always take it with you to your doctor appointments.  Keep a list of your current medications including injectables and over the counter medications and bring this medication list with you to all your doctor appointments.  If you have not seen your ophthalmologist this year call for appointment.  Check your feet daily for redness, sores, or openings. Do not self treat. If no improvement in two days call your primary care physician for an appointment.  Low blood sugar (hypoglycemia) is a blood sugar below 70mg/dl. Check your blood sugar if you feel signs/symptoms of hypoglycemia. If your blood sugar is below 70 take 15 grams of carbohydrates (ex 4 oz of apple juice, 3-4 glucose tablets, or 4-6 oz of regular soda) wait 15 minutes and repeat blood sugar to make sure it comes up above 70.  If your blood sugar is above 70 and you are due for a meal, have a meal.  If you are not due for a meal have a snack.  This snack helps keeps your blood sugar at a safe range.      Diagnosis: Gout  Assessment and Plan of Treatment: Continue Colchicine for treatment    Diagnosis: CAD (coronary artery disease)  Assessment and Plan of Treatment: Coronary artery disease is a condition where the arteries the supply the heart muscle get clogges with fatty deposits & puts you at risk for a heart attack  Call your doctor if you have any new pain, pressure, or discomfort in the center of your chest, pain, tingling or discomfort in arms, back, neck, jaw, or stomach, shortness of breath, nausea, vomiting, burping or heartburn, sweating, cold and clammy skin, racing or abnormal heartbeat for more than 10 minutes or if they keep coming & going.  Call 911 and do not tr to get to hospital by care  You can help yourself with lefestyle changes (quitting smoking if you Informed pt of the various negative side effects of smoking including risk of COPD, Lung Ca etc  Strongly recommended that pt stops smoking and pt given various options of smoking cessasion tools such as NRT's and other pharmacotherapies), eat lots of fruits & vegetables & low fat dairy products, not a lot of meat & fatty foods, walk or some form of physical activity most days of the week, lose weight if you are overweight  Take your cardiac medication as prescribed to lower cholesterol, to lower blood pressure, aspirin to prevent blood clots, and diabetes control  Make sure to keep appointments with doctor for cardiac follow up care      Diagnosis: Atrial fibrillation  Assessment and Plan of Treatment: Atrial fibrillation is the most common heart rhythm problem & has the risk of stroke & heart attack  You are off of anticoagulation at his time in anticipation of your surgery,   It helps if you control your blood pressure, not drink more than 1-2 alcohol drinks per day, cut down on caffeine, getting treatment for over active thyroid gland, & getting exercise  Call your doctor if you feel your heart racing or beating unusually, chest tightness or pain, lightheaded, faint, shortness of breath especially with exercise  It is important to take your heart medication as prescribed  You may be on anticoagulation which is very important to take as directed - you may need blood work to monitor drug levels      Diagnosis: Chronic diastolic (congestive) heart failure  Assessment and Plan of Treatment: Weigh yourself daily.  If you gain 3lbs in 3 days, or 5lbs in a week call your Health Care Provider.  Do not eat or drink foods containing more than 2000mg of salt (sodium) in your diet every day.  Call your Health Care Provider if you have any swelling or increased swelling in your feet, ankles, and/or stomach.  The Pt was provided with CHF diet instruction (low sodium diet, daily weights, label reading, Heart Healthy Cooking Tips & Heart Healthy shopping Tips).  Take all of your medication as directed.  If you become dizzy call your Health Care Provider.

## 2019-11-15 NOTE — PROGRESS NOTE ADULT - ASSESSMENT
65 y/o M with PMH of CAD s/p stents, HFrEF (EF 40% 12/2018), DMT2, HTN, CKD, NSTEMI, Afib (on Pradaxa). Presents to ED with worsening SOB and productive cough with .   pt  recently hospitalized last month and found to have small R hydropneumothorax of unclear etiology. Plans previous admission for drainage of effusion and sampling of fluid and analysis, but patient refused at that time and agreed for outpt f/u. presents now w/ sob       Problem: Hydropneumothorax.   CT from previous admission with R sided hydropneumothorax of unclear etiology.  s/p thorocentesis/c/w exudate  s/p pigtail cath   s/p  ct   pulm f/u noted   thorc sx eval noted       ·  Problem: CHF (congestive heart failure).  Recommendation: -Keep O>I as tolerated.   c/w current meds  cards eval noted    dm /uncontrolled  endo eval noted  fsg riss  c/w insulin regimen as per endo        dm neuropathy  c/w lyrica  oxycodone for severe pain     alex/ ckd  renal f/u   c/w recs as per renal     l knee pain bettr  gout likely   discussed w/ renal  colcrys added  uric acid noted

## 2019-11-15 NOTE — DISCHARGE NOTE PROVIDER - NSDCFUSCHEDAPPT_GEN_ALL_CORE_FT
DYLAN DEL CASTILLO ; 01/03/2020 ; NPP [facility] [phone] [facility] DYLAN DEL CASTILLO ; 01/03/2020 ; NPP Med Endocr [address] [facility]

## 2019-11-15 NOTE — PROGRESS NOTE ADULT - PROBLEM SELECTOR PROBLEM 3
CHF (congestive heart failure)
CHF (congestive heart failure)
Hyperlipidemia, unspecified hyperlipidemia type

## 2019-11-15 NOTE — PROGRESS NOTE ADULT - SUBJECTIVE AND OBJECTIVE BOX
INTERVAL HPI/OVERNIGHT EVENTS:  Knee pain improved.  Able to bear wt    MEDICATIONS  (STANDING):  aspirin enteric coated 81 milliGRAM(s) Oral daily  buMETAnide 2 milliGRAM(s) Oral daily  dextrose 5%. 1000 milliLiter(s) (50 mL/Hr) IV Continuous <Continuous>  dextrose 50% Injectable 12.5 Gram(s) IV Push once  dextrose 50% Injectable 25 Gram(s) IV Push once  dextrose 50% Injectable 25 Gram(s) IV Push once  diltiazem    Tablet 30 milliGRAM(s) Oral three times a day  influenza   Vaccine 0.5 milliLiter(s) IntraMuscular once  insulin glargine Injectable (LANTUS) 35 Unit(s) SubCutaneous at bedtime  insulin glargine Injectable (LANTUS) 35 Unit(s) SubCutaneous every morning  insulin lispro (HumaLOG) corrective regimen sliding scale   SubCutaneous at bedtime  insulin lispro (HumaLOG) corrective regimen sliding scale   SubCutaneous three times a day before meals  insulin lispro Injectable (HumaLOG) 22 Unit(s) SubCutaneous before dinner  insulin lispro Injectable (HumaLOG) 20 Unit(s) SubCutaneous before breakfast  insulin lispro Injectable (HumaLOG) 12 Unit(s) SubCutaneous before lunch  metolazone 2.5 milliGRAM(s) Oral daily  metoprolol succinate  milliGRAM(s) Oral daily  polyethylene glycol 3350 17 Gram(s) Oral once  pregabalin 100 milliGRAM(s) Oral two times a day  senna 2 Tablet(s) Oral at bedtime    MEDICATIONS  (PRN):  acetaminophen   Tablet .. 650 milliGRAM(s) Oral every 4 hours PRN Moderate Pain (4 - 6)  dextrose 40% Gel 15 Gram(s) Oral once PRN Blood Glucose LESS THAN 70 milliGRAM(s)/deciliter  glucagon  Injectable 1 milliGRAM(s) IntraMuscular once PRN Glucose LESS THAN 70 milligrams/deciliter  oxyCODONE    IR 5 milliGRAM(s) Oral two times a day PRN Severe Pain (7 - 10)  sodium chloride 0.65% Nasal 1 Spray(s) Both Nostrils three times a day PRN Nasal Congestion      Allergies    No Known Allergies    Intolerances        REVIEW OF SYSTEMS    General:	    Skin/Breast:  	  Ophthalmologic:  	  ENMT:	    Respiratory and Thorax:  	  Cardiovascular:	    Gastrointestinal:	    Genitourinary:	    Musculoskeletal:	    Neurological:	    Psychiatric:	    Hematology/Lymphatics:    Vital Signs Last 24 Hrs  T(C): 36.4 (15 Nov 2019 11:42), Max: 37.6 (14 Nov 2019 20:18)  T(F): 97.5 (15 Nov 2019 11:42), Max: 99.6 (14 Nov 2019 20:18)  HR: 91 (15 Nov 2019 13:30) (68 - 107)  BP: 118/74 (15 Nov 2019 13:30) (116/62 - 145/59)  BP(mean): --  RR: 18 (15 Nov 2019 13:30) (18 - 19)  SpO2: 95% (15 Nov 2019 13:30) (85% - 98%)      PHYSICAL EXAM:    Constitutional:  Fair    Eyes:    Neck:  supple      Skin:    Lymph Nodes:    Musculoskeletal:  Knee ROM improved  Toes non tender  Psychiatric:      LABS:                        13.9   9.82  )-----------( 252      ( 15 Nov 2019 08:03 )             46.1     11-15    136  |  89<L>  |  61<H>  ----------------------------<  158<H>  3.5   |  31  |  2.08<H>    Ca    9.0      15 Nov 2019 08:03            RADIOLOGY & ADDITIONAL TESTS:

## 2019-11-15 NOTE — DISCHARGE NOTE PROVIDER - CARE PROVIDER_API CALL
Anil Camargo)  Thoracic Surgery  [address] [place] [address] [place] [address]  Phone: [phone]  Fax: [phone]  Follow Up Time:

## 2019-11-15 NOTE — PROGRESS NOTE ADULT - PROBLEM SELECTOR PLAN 1
Patient seen and examined with Dr. Camargo  Recommend removal of right pigtail catheter  D/w patient re transfer to [facility] for possible decortication/pleurx catheter placement. Pt agreeing to transfer.

## 2019-11-15 NOTE — PROGRESS NOTE ADULT - SUBJECTIVE AND OBJECTIVE BOX
CHIEF COMPLAINT:Patient is a 64y old  Male who presents with a chief complaint of Pleural drainage  Gout (14 Nov 2019 18:43)    	        PAST MEDICAL & SURGICAL HISTORY:  Neuropathy  CAD (coronary artery disease)  MI (myocardial infarction): circa 2014  Atrial fibrillation  TIA (transient ischemic attack)  DM type 2 (diabetes mellitus, type 2)  HLD (hyperlipidemia)  HTN (hypertension), benign  S/P carotid endarterectomy: left  Status post angioplasty with stent: LULU x 3 2/7/2014          REVIEW OF SYSTEMS:  CONSTITUTIONAL: No fever, weight loss, or fatigue  EYES: No eye pain, visual disturbances, or discharge  NECK: No pain or stiffness  RESPIRATORY: sob better overall  CARDIOVASCULAR: No chest pain, palpitations, passing out, dizziness, or leg swelling  GASTROINTESTINAL: No abdominal or epigastric pain. No nausea, vomiting, or hematemesis; No diarrhea or constipation. No melena or hematochezia.  GENITOURINARY: No dysuria, frequency, hematuria, or incontinence  NEUROLOGICAL: No headaches, memory loss, loss of strength, numbness, or tremors  l knee pain improved     Medications:  MEDICATIONS  (STANDING):  aspirin enteric coated 81 milliGRAM(s) Oral daily  buMETAnide 2 milliGRAM(s) Oral daily  dextrose 5%. 1000 milliLiter(s) (50 mL/Hr) IV Continuous <Continuous>  dextrose 50% Injectable 12.5 Gram(s) IV Push once  dextrose 50% Injectable 25 Gram(s) IV Push once  dextrose 50% Injectable 25 Gram(s) IV Push once  diltiazem    Tablet 30 milliGRAM(s) Oral three times a day  influenza   Vaccine 0.5 milliLiter(s) IntraMuscular once  insulin glargine Injectable (LANTUS) 35 Unit(s) SubCutaneous at bedtime  insulin glargine Injectable (LANTUS) 35 Unit(s) SubCutaneous every morning  insulin lispro (HumaLOG) corrective regimen sliding scale   SubCutaneous at bedtime  insulin lispro (HumaLOG) corrective regimen sliding scale   SubCutaneous three times a day before meals  insulin lispro Injectable (HumaLOG) 22 Unit(s) SubCutaneous before dinner  insulin lispro Injectable (HumaLOG) 20 Unit(s) SubCutaneous before breakfast  insulin lispro Injectable (HumaLOG) 12 Unit(s) SubCutaneous before lunch  metolazone 2.5 milliGRAM(s) Oral daily  metoprolol succinate  milliGRAM(s) Oral daily  pregabalin 100 milliGRAM(s) Oral two times a day    MEDICATIONS  (PRN):  acetaminophen   Tablet .. 650 milliGRAM(s) Oral every 4 hours PRN Moderate Pain (4 - 6)  dextrose 40% Gel 15 Gram(s) Oral once PRN Blood Glucose LESS THAN 70 milliGRAM(s)/deciliter  glucagon  Injectable 1 milliGRAM(s) IntraMuscular once PRN Glucose LESS THAN 70 milligrams/deciliter  oxyCODONE    IR 5 milliGRAM(s) Oral two times a day PRN Severe Pain (7 - 10)  sodium chloride 0.65% Nasal 1 Spray(s) Both Nostrils three times a day PRN Nasal Congestion    	    PHYSICAL EXAM:  T(C): 36.9 (11-15-19 @ 08:52), Max: 37.6 (11-14-19 @ 20:18)  HR: 74 (11-15-19 @ 08:52) (68 - 100)  BP: 120/71 (11-15-19 @ 08:52) (120/71 - 145/59)  RR: 18 (11-15-19 @ 10:37) (18 - 19)  SpO2: 85% (11-15-19 @ 10:37) (85% - 98%)  Wt(kg): --  I&O's Summary    14 Nov 2019 07:01  -  15 Nov 2019 07:00  --------------------------------------------------------  IN: 420 mL / OUT: 2525 mL / NET: -2105 mL    15 Nov 2019 07:01  -  15 Nov 2019 10:46  --------------------------------------------------------  IN: 0 mL / OUT: 400 mL / NET: -400 mL        Appearance: Normal	  HEENT:   Normal oral mucosa, PERRL, EOMI	  Lymphatic: No lymphadenopathy  Cardiovascular: Normal S1 S2, No JVD, N  Respiratory: dec bs   Psychiatry: A & O x 3,   Gastrointestinal:  Soft, Non-tender, + BS	  Skin: No rashes, No ecchymoses, No cyanosis	  Neurologic: Non-focal  Extremities:dec rom lknee but improving   Vascular: Peripheral pulses palpable 2+ bilaterally    TELEMETRY: 	    ECG:  	  RADIOLOGY:  OTHER: 	  	  LABS:	 	    CARDIAC MARKERS:                                13.9   9.82  )-----------( 252      ( 15 Nov 2019 08:03 )             46.1     11-15    136  |  89<L>  |  61<H>  ----------------------------<  158<H>  3.5   |  31  |  2.08<H>    Ca    9.0      15 Nov 2019 08:03      proBNP:   Lipid Profile:   HgA1c:   TSH:

## 2019-11-15 NOTE — DISCHARGE NOTE PROVIDER - HOSPITAL COURSE
64 year old male with Hypertension, Hyperlipidemia, Atrial Fibrillation, s/p DCCV, Coronary Artery Disease, NSTEMI, (2/2014), s/p PCI to mid LAD, Proximal LCx, Distal LCx (2/14, [facility], LULU), Congestive Heart Failure, Normal Ejection Fraction, Diabetes Mellitus, CVA, Severe Left Internal Carotid Disease, s/p CEA (2/2014) presenting with worsening SOB, cough found to have right sided pleural effusion, hydropneumothorax s/p thoracentesis on 11/8/19 with bloody exudate; cytology was negative for maligant cells.  Right pleural pigtail catheter placed with serosanguinous drainage.  Pigtail removed on 11/15 and SQ Lovenox to start on 11/16/19 AM. Repeat CT Chest with small loculated right hydropneumothorax containing pigtail cath.  Thoracic surgery was consulted, plans for Right VATs with decortication. Patient with recent TTE with no evidence of pericardial effusion, mod LAE, grossly borderline LV sys dysfx EF 45% likely underestimated due to AF.  During hospital course, patient was acute gout flare, rheumatology consulted, multiple one time doses of colchicine given, patient refusing doses of steroids due to uncontrolled blood sugars.  Endocrine consulted for uncontrolled blood sugars, insulin doses were adjusted accordingly.  Patient stable for transfer to [facility] for Right VATs with decortication.

## 2019-11-15 NOTE — PROGRESS NOTE ADULT - REASON FOR ADMISSION
Gout  PLeural effusion
Pleural drainage  Gout
SOB and cough secondary to right pleural effusion
CHF
CHF
SOB
SOB and Cough due to right pleural effusion
SOB secondary to right pleural effusion
SOB and cough

## 2019-11-15 NOTE — PROGRESS NOTE ADULT - ASSESSMENT
64 year old male with CKD Hypertension, Hyperlipidemia, Atrial Fibrillation, s/p DCCV, Coronary Artery Disease, NSTEMI, (2/2014), s/p PCI to mid LAD, Proximal LCx, Distal LCx (2/14, [facility], LULU), Congestive Heart Failure,  Diabetes Mellitus, CVA, Severe Left Internal Carotid Disease, s/p CEA (2/2014) recently admitted for hydropneumothorax, a drainage of effusion and sampling of fluid and analysis was suggested however patient refused at that time.  Pt. presents  today with worsening SOB and cough with worsening pl. effusion         1- ckd III   2- proteinuria   3- chf   4- HTN   5- pleural effusion  s/p thoracentesis  6- a fib    creatinine steady range   will need arb in near future for proteinuria   bumex 2 mg po qd   Metolazone 2.5 mg qd   cont toprol xl 100 mg qd  hyperuricemia  acute gout attack colcrys 0.6 mg po x3 d to stop if diarrhea

## 2019-11-15 NOTE — PROGRESS NOTE ADULT - ATTENDING COMMENTS
Patient seen and examined, agree with the above assessment and plan by JOYCE Tompkins.  afib w RVR  cardizem started  cont AC  cytology on pleural fluid negative  knee pain likely gout  cont pleural drainage per pulm
agree with above  cv status remains stable  awaiting cytology results  cont current mgmt
agree with above  cv status remains stable  awaiting thoracentesis  cont current mgmt
agree with the above assessment and plan by JOYCE Tompkins.  afib rate controlled  cont AC  cytology on pleural fluid negative  knee pain likely gout  cont pleural drainage per pulm  CT eval
agree with the above assessment and plan by JOYCE Tompkins.  afib rate controlled  cont AC  cytology on pleural fluid negative  knee pain likely gout  cont pleural drainage per pulm  CT eval
Pt for thoracentesis tomorrow, consideration for trapped lung.
Effusion bloody, s/p thoracentesis, repeat ct wo hydropneumothorax, appearance of  trapped lung visible from ct in october not visible, lung appeared to rexapand  Suggest drainage with chest tube
Pt may well require decortication
pt seen and examined  noted Hydropneumothorax read on CXR  will get Thoracic surg evaluation  Dr. Camargo called, msg left for call back    pt at this time asymptomatic  f/u CXR in am pending thoracic surg
Chest tube with 70cc drainage   Would pull chest tube today.   Pt in need of VATS decortication, will be transferred to [facility] for procedure next week, d/w thoracic md
Pt will possibly require vats decortication  fu ct chest
Agree with assessment and plan as above by Dr. Nielsen. Reviewed all pertinent labs, glucose values, and imaging studies. Modifications made as indicated above.     Moreno Romero D.O  [phone]
Agree with assessment and plan as above by Dr. Nielsen. Reviewed all pertinent labs, glucose values, and imaging studies. Modifications made as indicated above.     Moreno Romero D.O  [phone]
Pt seen and examined. Agree with note as above with addendum: patient with swelling at his L knee. Per patient and his daughter he only gets gout flares in his hands and elbows. Spent time discussing with patient and daughter that his sedentary activity, stress and possible infection will make his insulin requirements higher than at home. I also identified that he has issues paying for meds - Pradaxa and insulin can be costly.  Janna Andrade MD  [phone]

## 2019-11-15 NOTE — PROGRESS NOTE ADULT - SUBJECTIVE AND OBJECTIVE BOX
Subjective: Pt states" " Denies any CP, SOB, palpitations. No acute events overnight.    Vital Signs:  Vital Signs Last 24 Hrs  T(C): 36.4 (11-15-19 @ 11:42), Max: 37.6 (11-14-19 @ 20:18)  T(F): 97.5 (11-15-19 @ 11:42), Max: 99.6 (11-14-19 @ 20:18)  HR: 91 (11-15-19 @ 13:30) (68 - 107)  BP: 118/74 (11-15-19 @ 13:30) (116/62 - 145/59)  RR: 18 (11-15-19 @ 13:30) (18 - 19)  SpO2: 95% (11-15-19 @ 13:30) (85% - 98%) on (O2)        Relevant labs, radiology and Medications reviewed                        13.9   9.82  )-----------( 252      ( 15 Nov 2019 08:03 )             46.1     11-15    136  |  89<L>  |  61<H>  ----------------------------<  158<H>  3.5   |  31  |  2.08<H>    Ca    9.0      15 Nov 2019 08:03        MEDICATIONS  (STANDING):  aspirin enteric coated 81 milliGRAM(s) Oral daily  buMETAnide 2 milliGRAM(s) Oral daily  dextrose 5%. 1000 milliLiter(s) (50 mL/Hr) IV Continuous <Continuous>  dextrose 50% Injectable 12.5 Gram(s) IV Push once  dextrose 50% Injectable 25 Gram(s) IV Push once  dextrose 50% Injectable 25 Gram(s) IV Push once  diltiazem    Tablet 30 milliGRAM(s) Oral three times a day  influenza   Vaccine 0.5 milliLiter(s) IntraMuscular once  insulin glargine Injectable (LANTUS) 35 Unit(s) SubCutaneous at bedtime  insulin glargine Injectable (LANTUS) 35 Unit(s) SubCutaneous every morning  insulin lispro (HumaLOG) corrective regimen sliding scale   SubCutaneous at bedtime  insulin lispro (HumaLOG) corrective regimen sliding scale   SubCutaneous three times a day before meals  insulin lispro Injectable (HumaLOG) 22 Unit(s) SubCutaneous before dinner  insulin lispro Injectable (HumaLOG) 20 Unit(s) SubCutaneous before breakfast  insulin lispro Injectable (HumaLOG) 12 Unit(s) SubCutaneous before lunch  metolazone 2.5 milliGRAM(s) Oral daily  metoprolol succinate  milliGRAM(s) Oral daily  polyethylene glycol 3350 17 Gram(s) Oral once  pregabalin 100 milliGRAM(s) Oral two times a day  senna 2 Tablet(s) Oral at bedtime    MEDICATIONS  (PRN):  acetaminophen   Tablet .. 650 milliGRAM(s) Oral every 4 hours PRN Moderate Pain (4 - 6)  dextrose 40% Gel 15 Gram(s) Oral once PRN Blood Glucose LESS THAN 70 milliGRAM(s)/deciliter  glucagon  Injectable 1 milliGRAM(s) IntraMuscular once PRN Glucose LESS THAN 70 milligrams/deciliter  oxyCODONE    IR 5 milliGRAM(s) Oral two times a day PRN Severe Pain (7 - 10)  sodium chloride 0.65% Nasal 1 Spray(s) Both Nostrils three times a day PRN Nasal Congestion      I&O's Summary    14 Nov 2019 07:01  -  15 Nov 2019 07:00  --------------------------------------------------------  IN: 420 mL / OUT: 2525 mL / NET: -2105 mL    15 Nov 2019 07:01  -  15 Nov 2019 15:29  --------------------------------------------------------  IN: 600 mL / OUT: 800 mL / NET: -200 mL        IMAGING: Reviewed by Dr. Camargo    CXR:    CT Chest:    PAST MEDICAL & SURGICAL HISTORY:  Neuropathy  CAD (coronary artery disease)  MI (myocardial infarction): circa 2014  Atrial fibrillation  TIA (transient ischemic attack)  DM type 2 (diabetes mellitus, type 2)  HLD (hyperlipidemia)  HTN (hypertension), benign  S/P carotid endarterectomy: left  Status post angioplasty with stent: LULU x 3 2/7/2014       Physical Exam:  Neurology: A&Ox3, nonfocal, ROA x 4, NAD  Respiratory: B/L BS CTA, diminished at bases, No wheezing, rales, rhonchi  CV: RRR, S1S2

## 2019-11-15 NOTE — PROGRESS NOTE ADULT - PROBLEM SELECTOR PLAN 2
-Keep O>I as tolerated.
-S/p R thoracentesis 11/8, 490cc bloody fluid drained. Effusion exudative by Light's Criteria.   -Cyto negative for malignant cells (report not visible in [place], copy in pt's chart)  -S/p R pigtail placement in IR
-S/p R thoracentesis 11/8, 490cc bloody fluid drained. Effusion exudative by Light's Criteria.   -Cyto negative for malignant cells (report not visible in [place], copy in pt's chart)  -S/p R pigtail placement in IR  -Keep sats >90% with supplemental O2 as needed
goal 130/80, above goal   management per primary team
goal 130/80, above goal   management per primary team
goal 130/80, at goal   management per primary team

## 2019-11-15 NOTE — PROGRESS NOTE ADULT - ASSESSMENT
64 year old male with Hypertension, Hyperlipidemia, Atrial Fibrillation, s/p DCCV, Coronary Artery Disease, NSTEMI, (2/2014), s/p PCI to mid LAD, Proximal LCx, Distal LCx (2/14, [facility], LULU), Congestive Heart Failure, Normal Ejection Fraction, Diabetes Mellitus, CVA, Severe Left Internal Carotid Disease, s/p CEA (2/2014) presenting with worsening SOB, cough.     1.Right-sided Pleural effusion, Hydropneumothorax  -s/p right pleural pigtail catheter, cont drainage-serosanguinous fluid   -s/p right thoracentesis 11/8/0219, bloody exudate  -cyto negative for malignant cells  -pulmo/thoracic f/u for further management;  s/p R pigtail  - chest xray 11/13 small right hydropneumothorax  - repeat CT with small loculated right hydropneumothorax containing pigtail cath  - Thoracic surgery f/u.     2. Chronic Congestive heart failure, Diastolic.   -stable,   -continue bumex, metol  -recent echo with no evidence of pericardial effusion, mod LAE, grossly borderline LV sys dysfx EF 45% likely underestimated due to AF     3. Atrial Fibrillation, chronic   -rates improving today   -c/w metoprolol succinate ER 100mg daily  -c/w cardizem 30 mg PO TID   -s/p pigtail placement  -Resume a/c today if no plans for chest tube removal    4. Coronary Artery Disease. S/P PCI to LAD, LCx.  -stable, Continue ASA 81mg and statin    5. Acute gout , elevated uric acid  rheum f/u    dvt ppx

## 2019-11-15 NOTE — DISCHARGE NOTE PROVIDER - NSDCMRMEDTOKEN_GEN_ALL_CORE_FT
aspirin 81 mg oral delayed release tablet: 1 tab(s) orally once a day  Basaglar KwikPen 100 units/mL subcutaneous solution: 25 unit(s) subcutaneous once a day (at bedtime)  bumetanide 2 mg oral tablet: 1 tab(s) orally once a day  dabigatran 150 mg oral capsule: 1 cap(s) orally 2 times a day  Lyrica 100 mg oral capsule: 1 cap(s) orally 2 times a day  metOLazone 5 mg oral tablet: 1 tab(s) orally Monday, Wednesday, and Friday  metoprolol succinate 50 mg oral tablet, extended release: 2 tab(s) orally once a day (100 mg)  NovoLOG FlexPen 100 units/mL injectable solution: unit(s) injectable 3 times a day (before meals) - Sliding Scale  Tylenol 325 mg oral tablet: 2 tab(s) orally every 4 hours, As Needed acetaminophen 325 mg oral tablet: 2 tab(s) orally every 4 hours, As needed, Moderate Pain (4 - 6)  aspirin 81 mg oral delayed release tablet: 1 tab(s) orally once a day  bumetanide 2 mg oral tablet: 1 tab(s) orally once a day  Colcrys 0.6 mg oral tablet: 1 tab(s) orally once a day for 2 more days to stop 11/18/2019  dilTIAZem 30 mg oral tablet: 1 tab(s) orally 3 times a day  enoxaparin 150 mg/mL injectable solution: 130 milligram(s) injectable 2 times a day  insulin glargine 100 units/mL subcutaneous solution: 35 unit(s) subcutaneous once a day (at bedtime)  insulin glargine 100 units/mL subcutaneous solution: 35 unit(s) subcutaneous once a day in AM   insulin lispro 100 units/mL injectable solution: injectable 3 times a day (before meals)  2 Unit(s) if Glucose 151 - 200  4 Unit(s) if Glucose 201 - 250  6 Unit(s) if Glucose 251 - 300  8 Unit(s) if Glucose 301 - 350  10 Unit(s) if Glucose 351 - 400  12 Unit(s) if Glucose Greater Than 400  insulin lispro 100 units/mL injectable solution: injectable once a day (at bedtime)  2 Unit(s) if Glucose 251 - 300  4 Unit(s) if Glucose 301 - 350  6 Unit(s) if Glucose 351 - 400  8 Unit(s) if Glucose GRAETER THAN 400  insulin lispro 100 units/mL injectable solution: 22 unit(s) injectable once a day before dinner   insulin lispro 100 units/mL injectable solution: 12 unit(s) injectable once a day before lunch   insulin lispro 100 units/mL injectable solution: 20 unit(s) injectable once a day before breakfast   metOLazone 2.5 mg oral tablet: 1 tab(s) orally once a day  metoprolol succinate 100 mg oral tablet, extended release: 1 tab(s) orally once a day  oxyCODONE 5 mg oral tablet: 1 tab(s) orally 2 times a day, As needed, Severe Pain (7 - 10)  pregabalin 100 mg oral capsule: 1 cap(s) orally 2 times a day  senna oral tablet: 2 tab(s) orally once a day (at bedtime)  sodium chloride 0.65% nasal spray: 1 spray(s) nasal 3 times a day

## 2019-11-15 NOTE — CHART NOTE - NSCHARTNOTEFT_GEN_A_CORE
MEDICINE NP NOTE  Spectra [id number]    Patient is s/p chest tube removal today.  Discussed with SUSAN Garcia from IR, may restart full dose Lovenox 11/16 AM.  Will continue to monitor.

## 2019-11-15 NOTE — PROGRESS NOTE ADULT - SUBJECTIVE AND OBJECTIVE BOX
[facility]   [phone]  RENAL FOLLOW UP NOTE  --------------------------------------------------------------------------------  Chief Complaint:    24 hour events/subjective:    seen earlier. had IR remove chest tube pig tail cath  pain left knee improving     PAST HISTORY  --------------------------------------------------------------------------------  No significant changes to PMH, PSH, FHx, SHx, unless otherwise noted    ALLERGIES & MEDICATIONS  --------------------------------------------------------------------------------  Allergies    No Known Allergies    Intolerances      Standing Inpatient Medications  aspirin enteric coated 81 milliGRAM(s) Oral daily  buMETAnide 2 milliGRAM(s) Oral daily  dextrose 5%. 1000 milliLiter(s) IV Continuous <Continuous>  dextrose 50% Injectable 12.5 Gram(s) IV Push once  dextrose 50% Injectable 25 Gram(s) IV Push once  dextrose 50% Injectable 25 Gram(s) IV Push once  diltiazem    Tablet 30 milliGRAM(s) Oral three times a day  influenza   Vaccine 0.5 milliLiter(s) IntraMuscular once  insulin glargine Injectable (LANTUS) 35 Unit(s) SubCutaneous at bedtime  insulin glargine Injectable (LANTUS) 35 Unit(s) SubCutaneous every morning  insulin lispro (HumaLOG) corrective regimen sliding scale   SubCutaneous at bedtime  insulin lispro (HumaLOG) corrective regimen sliding scale   SubCutaneous three times a day before meals  insulin lispro Injectable (HumaLOG) 22 Unit(s) SubCutaneous before dinner  insulin lispro Injectable (HumaLOG) 20 Unit(s) SubCutaneous before breakfast  insulin lispro Injectable (HumaLOG) 12 Unit(s) SubCutaneous before lunch  metolazone 2.5 milliGRAM(s) Oral daily  metoprolol succinate  milliGRAM(s) Oral daily  pregabalin 100 milliGRAM(s) Oral two times a day  senna 2 Tablet(s) Oral at bedtime    PRN Inpatient Medications  acetaminophen   Tablet .. 650 milliGRAM(s) Oral every 4 hours PRN  dextrose 40% Gel 15 Gram(s) Oral once PRN  glucagon  Injectable 1 milliGRAM(s) IntraMuscular once PRN  oxyCODONE    IR 5 milliGRAM(s) Oral two times a day PRN  sodium chloride 0.65% Nasal 1 Spray(s) Both Nostrils three times a day PRN      REVIEW OF SYSTEMS  --------------------------------------------------------------------------------    Gen: denies fevers/chills,  CVS: denies chest pain/palpitations  Resp: denies SOB/Cough  GI: Denies N/V/Abd pain  : Denies dysuria/oliguria/hematuria    All other systems were reviewed and are negative, except as noted.    VITALS/PHYSICAL EXAM  --------------------------------------------------------------------------------  T(C): 36.7 (11-15-19 @ 20:29), Max: 36.9 (11-15-19 @ 08:52)  HR: 88 (11-15-19 @ 20:29) (68 - 107)  BP: 111/56 (11-15-19 @ 20:29) (111/56 - 145/59)  RR: 18 (11-15-19 @ 20:29) (18 - 18)  SpO2: 96% (11-15-19 @ 20:29) (85% - 98%)  Wt(kg): --        11-14-19 @ 07:01  -  11-15-19 @ 07:00  --------------------------------------------------------  IN: 420 mL / OUT: 2525 mL / NET: -2105 mL    11-15-19 @ 07:01  -  11-15-19 @ 21:19  --------------------------------------------------------  IN: 840 mL / OUT: 800 mL / NET: 40 mL      Physical Exam:  	  	  Gen:  + obese appears comfortable  on O2    	no jvd   	Pulm: decrease bs  R base upto 3/4  no rales or ronchi or wheezing   	CV: RRR, S1S2; no rub  	Abd: +BS, soft, nontender/nondistended  	: No suprapubic tenderness  	UE: Warm, no cyanosis  no clubbing,  no edema  	LE: Warm, no cyanosis  no clubbing, no edema	      LABS/STUDIES  --------------------------------------------------------------------------------              13.9   9.82  >-----------<  252      [11-15-19 @ 08:03]              46.1     136  |  89  |  61  ----------------------------<  158      [11-15-19 @ 08:03]  3.5   |  31  |  2.08        Ca     9.0     [11-15-19 @ 08:03]          Uric acid 12.0      [11-15-19 @ 08:03]    Creatinine Trend:  SCr 2.08 [11-15 @ 08:03]  SCr 2.17 [11-14 @ 06:24]  SCr 1.79 [11-13 @ 06:43]  SCr 1.78 [11-12 @ 06:22]  SCr 1.74 [11-11 @ 11:47]              Urinalysis - [10-17-19 @ 04:40]      Color Light Yellow / Appearance Clear / SG 1.015 / pH 6.5      Gluc 500 mg/dL / Ketone Negative  / Bili Negative / Urobili Negative       Blood Small / Protein 300 mg/dL / Leuk Est Negative / Nitrite Negative      RBC 8 / WBC 3 / Hyaline 0 / Gran  / Sq Epi  / Non Sq Epi 0 / Bacteria Negative      HbA1c 11.7      [11-07-19 @ 09:14]  TSH 1.71      [12-28-18 @ 14:58]

## 2019-11-15 NOTE — PROGRESS NOTE ADULT - PROBLEM SELECTOR PLAN 1
R hydroPTX likely 2nd trapped lung  -CT chest non-contrast to further evaluate  -No air leak from CT  -c/w R CT to LWS  -Thoracic surgery f/u

## 2019-11-15 NOTE — PROGRESS NOTE ADULT - SUBJECTIVE AND OBJECTIVE BOX
Interventional Radiology Follow- Up Note      This is a 64y Male s/p right pigtail placement on 11/12 in Interventional Radiology with Dr. Geronimo.     Patient seen and examined @ bedside. Tolerating regular diet, OOB to BR, voiding without difficulty. States breathing has been much better. Denies fever, chills, nausea, vomiting or change in mental status. No complaints offered.       Vitals: T(F): 97.5 (11-15-19 @ 11:42), Max: 99.6 (11-14-19 @ 20:18)  HR: 91 (11-15-19 @ 13:30) (68 - 107)  BP: 118/74 (11-15-19 @ 13:30) (116/62 - 145/59)  RR: 18 (11-15-19 @ 13:30) (18 - 19)  SpO2: 95% (11-15-19 @ 13:30) (85% - 98%)      LABS:                        13.9   9.82  )-----------( 252      ( 15 Nov 2019 08:03 )             46.1     11-15    136  |  89<L>  |  61<H>  ----------------------------<  158<H>  3.5   |  31  |  2.08<H>    Ca    9.0      15 Nov 2019 08:03          Antibiotics: n/a    Cultures:   Culture - Body Fluid with Gram Stain (11.08.19 @ 22:02)    Gram Stain:   polymorphonuclear leukocytes seen  No organisms seen  by cytocentrifuge    Specimen Source: .Body Fluid Pleural Fluid    Culture Results:   No growth at 5 days    24hr Drain output: 125cc    PHYSICAL EXAM:  General: Nontoxic, in NAD, A&O x3  Abdomen: soft, NTND  Extremities: no pedal edema or calf tenderness noted   Drain device: Drain intact attached to pleurevac, dressing clean, dry, intact      Assessment/Plan:     65 yo male presents with shortness of breath, found to have right sided pleural effusion s/p thoracentesis 11/8 with reaccumulation, now s/p right chest pigtail catheter.      -continue to monitor daily out put    -management per thoracic/pulm  -keep dressing dry and intact  -will discuss with IR attending     Please call IR at extension [phone] with any questions, concerns, or issues regarding above.        Irma Pena, [facility]  Spectra # [phone]

## 2019-11-15 NOTE — PROGRESS NOTE ADULT - ASSESSMENT
64 year old male with HTN, HLD, Afib on pradaxa, s/p DCCV, Coronary Artery Disease, NSTEMI, (2/2014), s/p PCI to mid LAD, Proximal LCx, Distal LCx (2/14, [facility], [facility]), CHF w/ preserved EF, DM2, CVA, L carotid stenosis, s/p CEA (2/2014) p/w with increased SOB and cough.  Pt was recently admitted 10/16 found to have hydropneumothorax where he was offered drainage of effusion but patient deferred for outpatient follow up so he could attend his son's wedding. Since readmission: 11/5 CT increase from 10/16 CT in partially loculated moderate right pleural effusion w/ compressive atelectasis w/o pneumothorax. 11/8 thoracentisis by IR w/ 490cc blood tinged fluid found to be exudative effusion. 11/8 CT s/p IR thoracentesis w/ decreased pleural effusion w/o pneumothorax. 11/12 pigtail catheter placed by IR w/ 1L serosang/bloody fluid drained. 11/12 CXR w/ resolution of right pleural effusion. 11/13 small right hydropneumothorax.

## 2019-11-15 NOTE — PROGRESS NOTE ADULT - PROBLEM SELECTOR PLAN 1
- Check BG AC and HS  - C/w Lantus 35 units bid   - C/w Humalog 20-12-22 ac meals  - C/w Humalog moderate correction scale AC meals and moderate scale HS  -Plan discussed with pt/team. Will adjust insulin as needed  Contact info: [phone] (24/7). pager [phone]

## 2019-11-15 NOTE — PROGRESS NOTE ADULT - ASSESSMENT
63 y/o male with hx of uncontrolled T2DM c/b neuropathy, CKD3, macrovascular disease (CAD, TIA, left carotid artery stenosis). Also h/o HTN/HLD/Afib/CHF. Here with SOB and cough secondary to right hydropneumothorax > s/p placement of pigtail cath insertion for pleural effusion. Tolerating POs with glycemic control at goal today while on present insulin doses. No hypoglycemia. Pt states he wants to continue Lantus doses bid because insulin works better this way. Still has some pain in R knee but pt reports is improving.  Denies any SOB but requiring O2 now. BG goal (100-180 mg/dl). Will continue to assess BG levels while on present insulin doses and make adjustments if needed. Creat and wbc improving.

## 2019-11-15 NOTE — PROGRESS NOTE ADULT - SUBJECTIVE AND OBJECTIVE BOX
Diabetes Follow up note: Saw pt earlier today  Interval Hx: 65 y/o male with hx of uncontrolled T2DM c/b neuropathy, CKD3, macrovascular disease (CAD, TIA, left carotid artery stenosis). Also h/o HTN/HLD/Afib/CHF. Here with SOB and cough secondary to right hydropneumothorax > s/p placement of pigtail cath insertion for pleural effusion. Endocrine consult requested for management of hyperglycemia in patient with DM2 A1c >10%. Pt reports tolerating POs with glycemic control at goal while on present insulin doses. No hypoglycemia. Pt states he wants to continue Lantus doses bid because insulin works better this way. Still has some pain in R knee but pt reports is improving.  Denies any SOB but requiring O2 now. BG goal (100-180 mg/dl).     Review of Systems:  General: reports feeling better. Concern about what is going to happen with his lung.  GI: Tolerating POs. Denies N/V/D/Abd pain   CV: Denies CP/SOB  MSK: L knee gout pain improved    ENDO: No S&Sx of hypoglycemia      MEDS:  insulin glargine Injectable (LANTUS) 35 Unit(s) SubCutaneous at bedtime  insulin glargine Injectable (LANTUS) 35 Unit(s) SubCutaneous every morning  insulin lispro (HumaLOG) corrective regimen sliding scale   SubCutaneous at bedtime  insulin lispro (HumaLOG) corrective regimen sliding scale   SubCutaneous three times a day before meals  insulin lispro Injectable (HumaLOG) 22 Unit(s) SubCutaneous before dinner  insulin lispro Injectable (HumaLOG) 12 Unit(s) SubCutaneous before lunch  insulin lispro Injectable (HumaLOG) 20 Unit(s) SubCutaneous before breakfast      Allergies    No Known Allergies        PE:  General: male lying on L side in NAD  Vital Signs Last 24 Hrs  T(C): 36.4 (11-15-19 @ 16:34), Max: 37.6 (11-14-19 @ 20:18)  T(F): 97.6 (11-15-19 @ 16:34), Max: 99.6 (11-14-19 @ 20:18)  HR: 92 (11-15-19 @ 16:34) (68 - 107)  BP: 114/50 (11-15-19 @ 16:34) (114/50 - 145/59)  BP(mean): --  RR: 18 (11-15-19 @ 16:34) (18 - 19)  SpO2: 94% (11-15-19 @ 16:34) (85% - 98%)  HEENT: nasal cannula in place  Chest/Back: R upper back/thorax pigtail  in place with minimal serosanguinous drainage, dressing C/D/I   Abd: Soft, NT, ND. obese  Extremities: Warm, trace B/L LE edema with chronic venous changes. Slight tenderness/edema in R knee  Neuro: A&O X3    LABS:  POCT Blood Glucose.: 135 mg/dL (11-15-19 @ 11:44)  POCT Blood Glucose.: 134 mg/dL (11-15-19 @ 07:54)  POCT Blood Glucose.: 254 mg/dL (11-14-19 @ 21:26)  POCT Blood Glucose.: 106 mg/dL (11-14-19 @ 16:43)  POCT Blood Glucose.: 190 mg/dL (11-14-19 @ 11:51)  POCT Blood Glucose.: 111 mg/dL (11-14-19 @ 07:48)  POCT Blood Glucose.: 158 mg/dL (11-13-19 @ 21:08)                          13.9   9.82  )-----------( 252      ( 15 Nov 2019 08:03 )             46.1     11-15    136  |  89<L>  |  61<H>  ----------------------------<  158<H>  3.5   |  31  |  2.08<H>    Ca    9.0      15 Nov 2019 08:03      Hemoglobin A1C, Whole Blood: 11.7 % <H> [4.0 - 5.6] (11-07-19 @ 09:14)  Hemoglobin A1C, Whole Blood: 10.3 % <H> [4.0 - 5.6] (10-17-19 @ 07:43)

## 2019-11-15 NOTE — PROGRESS NOTE ADULT - ASSESSMENT
63 y/o M with PMH of CAD s/p stents, HFrEF (EF 40% 12/2018), DMT2, HTN, CKD, NSTEMI, Afib (on Pradaxa). Presents to ED with worsening SOB and productive cough with clear/whitish sputum for the past week. He was recently hospitalized last month and found to have small R hydropneumothorax of unclear etiology. Plans previous admission for drainage of effusion and sampling of fluid and analysis, but patient refused at that time, as he watned to go to his son's wedding. He did not present for outpt visit. CT chest with increase in R pl effusion. S/p R thoracentesis 11/8 with exudative effusion by [facility]. s/p R CT placement now with R hydroPTX

## 2019-11-15 NOTE — PROGRESS NOTE ADULT - PROBLEM SELECTOR PROBLEM 2
CHF (congestive heart failure)
HTN (hypertension), benign
Pleural effusion
Pleural effusion
HTN (hypertension), benign

## 2019-11-15 NOTE — PROGRESS NOTE ADULT - ASSESSMENT
Imp:  Gout of knee with hyperuricemia; improved on Colchicine    Plan;  continue colchcine at 0.6mg daily for a few days

## 2019-11-15 NOTE — PROGRESS NOTE ADULT - PROBLEM SELECTOR PLAN 3
-Keep O>I as tolerated.
-Keep O>I as tolerated.
Consider fasting lipid panel   goal LDL <70  Continue statin

## 2019-11-15 NOTE — PROGRESS NOTE ADULT - SUBJECTIVE AND OBJECTIVE BOX
Called for chest tube removal. Case d/w thoracic NP and pulmonary NP both recommended chest removal prior to transfer to [facility] for decortication/ Pleurex catheter.  Dr. Lucero aware and agree.     Right chest wall and the existing tube was prepped and drained. The suture was cut and the chest tube was removed without difficulty during inspiration. A sterile occlusive dressing was applied. Pt tolerated procedure well. Chest X ray ordered.     Call IR with questions or concerns at [phone] or [phone]. Called for chest tube removal. Case d/w thoracic NP and pulmonary NP both recommended chest removal prior to transfer to [facility] for decortication/ Pleurex catheter.  Dr. Lucero aware and agree.     Right chest wall and the existing tube was prepped and draiped. The suture was cut and the chest tube was removed without difficulty during inspiration. A sterile occlusive dressing was applied. Pt tolerated procedure well. Chest X ray ordered.     Call IR with questions or concerns at [phone] or [phone].

## 2019-11-15 NOTE — PROGRESS NOTE ADULT - SUBJECTIVE AND OBJECTIVE BOX
CARDIOLOGY FOLLOW UP - Dr. Mazariegos    CC no cp or sob       PHYSICAL EXAM:  T(C): 36.9 (11-15-19 @ 08:52), Max: 37.6 (11-14-19 @ 20:18)  HR: 74 (11-15-19 @ 08:52) (68 - 100)  BP: 120/71 (11-15-19 @ 08:52) (120/71 - 145/59)  RR: 18 (11-15-19 @ 10:37) (18 - 19)  SpO2: 85% (11-15-19 @ 10:37) (85% - 98%)  Wt(kg): --  I&O's Summary    14 Nov 2019 07:01  -  15 Nov 2019 07:00  --------------------------------------------------------  IN: 420 mL / OUT: 2525 mL / NET: -2105 mL    15 Nov 2019 07:01  -  15 Nov 2019 12:17  --------------------------------------------------------  IN: 0 mL / OUT: 400 mL / NET: -400 mL        Appearance: Normal	  Cardiovascular: Normal S1 S2,R irregular    Respiratory: diminished to base. R pigtail to LWS,  Gastrointestinal:  Soft, Non-tender, + BS	  Extremities: Normal range of motion, No clubbing, cyanosis or edema        MEDICATIONS  (STANDING):  aspirin enteric coated 81 milliGRAM(s) Oral daily  buMETAnide 2 milliGRAM(s) Oral daily  dextrose 5%. 1000 milliLiter(s) (50 mL/Hr) IV Continuous <Continuous>  dextrose 50% Injectable 12.5 Gram(s) IV Push once  dextrose 50% Injectable 25 Gram(s) IV Push once  dextrose 50% Injectable 25 Gram(s) IV Push once  diltiazem    Tablet 30 milliGRAM(s) Oral three times a day  influenza   Vaccine 0.5 milliLiter(s) IntraMuscular once  insulin glargine Injectable (LANTUS) 35 Unit(s) SubCutaneous at bedtime  insulin glargine Injectable (LANTUS) 35 Unit(s) SubCutaneous every morning  insulin lispro (HumaLOG) corrective regimen sliding scale   SubCutaneous at bedtime  insulin lispro (HumaLOG) corrective regimen sliding scale   SubCutaneous three times a day before meals  insulin lispro Injectable (HumaLOG) 22 Unit(s) SubCutaneous before dinner  insulin lispro Injectable (HumaLOG) 20 Unit(s) SubCutaneous before breakfast  insulin lispro Injectable (HumaLOG) 12 Unit(s) SubCutaneous before lunch  metolazone 2.5 milliGRAM(s) Oral daily  metoprolol succinate  milliGRAM(s) Oral daily  pregabalin 100 milliGRAM(s) Oral two times a day      TELEMETRY: afib hr 90- 110	    ECG:  	  RADIOLOGY:   DIAGNOSTIC TESTING:  [ ] Echocardiogram:  [ ]  Catheterization:  [ ] Stress Test:    OTHER: 	  < from: CT Chest No Cont (11.14.19 @ 15:17) >  Impression: Small loculated right hydropneumothorax containing pigtail   catheter.        < end of copied text >    LABS:	 	                            13.9   9.82  )-----------( 252      ( 15 Nov 2019 08:03 )             46.1     11-15    136  |  89<L>  |  61<H>  ----------------------------<  158<H>  3.5   |  31  |  2.08<H>    Ca    9.0      15 Nov 2019 08:03

## 2019-11-15 NOTE — PROGRESS NOTE ADULT - SUBJECTIVE AND OBJECTIVE BOX
Follow-up Pulm Progress Note    No new respiratory events overnight.  Denies SOB/CP.   R pigtail to LWS, drained ~25cc overnight  Sats 85% RA, 95% 2LNC    Medications:  MEDICATIONS  (STANDING):  aspirin enteric coated 81 milliGRAM(s) Oral daily  buMETAnide 2 milliGRAM(s) Oral daily  dextrose 5%. 1000 milliLiter(s) (50 mL/Hr) IV Continuous <Continuous>  dextrose 50% Injectable 12.5 Gram(s) IV Push once  dextrose 50% Injectable 25 Gram(s) IV Push once  dextrose 50% Injectable 25 Gram(s) IV Push once  diltiazem    Tablet 30 milliGRAM(s) Oral three times a day  influenza   Vaccine 0.5 milliLiter(s) IntraMuscular once  insulin glargine Injectable (LANTUS) 35 Unit(s) SubCutaneous at bedtime  insulin glargine Injectable (LANTUS) 35 Unit(s) SubCutaneous every morning  insulin lispro (HumaLOG) corrective regimen sliding scale   SubCutaneous at bedtime  insulin lispro (HumaLOG) corrective regimen sliding scale   SubCutaneous three times a day before meals  insulin lispro Injectable (HumaLOG) 22 Unit(s) SubCutaneous before dinner  insulin lispro Injectable (HumaLOG) 20 Unit(s) SubCutaneous before breakfast  insulin lispro Injectable (HumaLOG) 12 Unit(s) SubCutaneous before lunch  metolazone 2.5 milliGRAM(s) Oral daily  metoprolol succinate  milliGRAM(s) Oral daily  pregabalin 100 milliGRAM(s) Oral two times a day    MEDICATIONS  (PRN):  acetaminophen   Tablet .. 650 milliGRAM(s) Oral every 4 hours PRN Moderate Pain (4 - 6)  dextrose 40% Gel 15 Gram(s) Oral once PRN Blood Glucose LESS THAN 70 milliGRAM(s)/deciliter  glucagon  Injectable 1 milliGRAM(s) IntraMuscular once PRN Glucose LESS THAN 70 milligrams/deciliter  oxyCODONE    IR 5 milliGRAM(s) Oral two times a day PRN Severe Pain (7 - 10)  sodium chloride 0.65% Nasal 1 Spray(s) Both Nostrils three times a day PRN Nasal Congestion          Vital Signs Last 24 Hrs  T(C): 36.9 (15 Nov 2019 08:52), Max: 37.6 (14 Nov 2019 20:18)  T(F): 98.4 (15 Nov 2019 08:52), Max: 99.6 (14 Nov 2019 20:18)  HR: 74 (15 Nov 2019 08:52) (68 - 100)  BP: 120/71 (15 Nov 2019 08:52) (120/71 - 145/59)  BP(mean): --  RR: 18 (15 Nov 2019 10:37) (18 - 19)  SpO2: 85% (15 Nov 2019 10:37) (85% - 98%)          11-14 @ 07:01  -  11-15 @ 07:00  --------------------------------------------------------  IN: 420 mL / OUT: 2525 mL / NET: -2105 mL          LABS:                        13.9   9.82  )-----------( 252      ( 15 Nov 2019 08:03 )             46.1     11-15    136  |  89<L>  |  61<H>  ----------------------------<  158<H>  3.5   |  31  |  2.08<H>    Ca    9.0      15 Nov 2019 08:03            CAPILLARY BLOOD GLUCOSE      POCT Blood Glucose.: 134 mg/dL (15 Nov 2019 07:54)                    Fluid characteristics  PLEURAL 11-08 @ 18:05  pH 7.74    tprot 3.9    Cell count  Appearance Sl Bloody  Fluid type --  BF lymph 77  color Red  eosinophil 2  PMN 11  Mesothelial 2  Monocyte 8  Other body cells --      CULTURES:     Culture - Body Fluid with Gram Stain (collected 11-08-19 @ 22:02)  Source: .Body Fluid Pleural Fluid  Gram Stain (11-08-19 @ 23:14):    polymorphonuclear leukocytes seen    No organisms seen    by cytocentrifuge  Final Report (11-13-19 @ 16:33):    No growth at 5 days            Physical Examination:  PULM: diminished BS R base  CVS: irreg irreg    RADIOLOGY REVIEWED  CT chest:   No hilar and/or mediastinal adenopathy is noted.     Heart is enlarged in size. Calcification of the aortic valve and the   coronary arteries is noted. No pericardial effusion is noted.     No endobronchial lesions are noted. Round atelectasis is noted within the   right middle lobe and the right lower lobes. Volume loss of the right   lung is noted. Small loculated right hydropneumothorax containing a   pigtail catheter is noted. Small left pleural effusion is noted.    Below the diaphragm, visualized portions of the abdomen are unremarkable.     Degenerative changes of the spine are noted.    Impression: Small loculated right hydropneumothorax containing pigtail   catheter.

## 2019-11-16 LAB
ANION GAP SERPL CALC-SCNC: 18 MMOL/L — HIGH (ref 5–17)
BUN SERPL-MCNC: 66 MG/DL — HIGH (ref 7–23)
CALCIUM SERPL-MCNC: 9.3 MG/DL — SIGNIFICANT CHANGE UP (ref 8.4–10.5)
CHLORIDE SERPL-SCNC: 90 MMOL/L — LOW (ref 96–108)
CO2 SERPL-SCNC: 31 MMOL/L — SIGNIFICANT CHANGE UP (ref 22–31)
CREAT SERPL-MCNC: 2.1 MG/DL — HIGH (ref 0.5–1.3)
GLUCOSE BLDC GLUCOMTR-MCNC: 100 MG/DL — HIGH (ref 70–99)
GLUCOSE BLDC GLUCOMTR-MCNC: 109 MG/DL — HIGH (ref 70–99)
GLUCOSE BLDC GLUCOMTR-MCNC: 159 MG/DL — HIGH (ref 70–99)
GLUCOSE BLDC GLUCOMTR-MCNC: 161 MG/DL — HIGH (ref 70–99)
GLUCOSE BLDC GLUCOMTR-MCNC: 174 MG/DL — HIGH (ref 70–99)
GLUCOSE SERPL-MCNC: 183 MG/DL — HIGH (ref 70–99)
HCT VFR BLD CALC: 50.9 % — HIGH (ref 39–50)
HGB BLD-MCNC: 15.3 G/DL — SIGNIFICANT CHANGE UP (ref 13–17)
MCHC RBC-ENTMCNC: 25.4 PG — LOW (ref 27–34)
MCHC RBC-ENTMCNC: 30.1 GM/DL — LOW (ref 32–36)
MCV RBC AUTO: 84.4 FL — SIGNIFICANT CHANGE UP (ref 80–100)
PLATELET # BLD AUTO: 237 K/UL — SIGNIFICANT CHANGE UP (ref 150–400)
POTASSIUM SERPL-MCNC: 3.4 MMOL/L — LOW (ref 3.5–5.3)
POTASSIUM SERPL-SCNC: 3.4 MMOL/L — LOW (ref 3.5–5.3)
RBC # BLD: 6.03 M/UL — HIGH (ref 4.2–5.8)
RBC # FLD: 15.7 % — HIGH (ref 10.3–14.5)
SODIUM SERPL-SCNC: 139 MMOL/L — SIGNIFICANT CHANGE UP (ref 135–145)
WBC # BLD: 8.48 K/UL — SIGNIFICANT CHANGE UP (ref 3.8–10.5)
WBC # FLD AUTO: 8.48 K/UL — SIGNIFICANT CHANGE UP (ref 3.8–10.5)

## 2019-11-16 PROCEDURE — 99232 SBSQ HOSP IP/OBS MODERATE 35: CPT

## 2019-11-16 RX ORDER — POTASSIUM CHLORIDE 20 MEQ
20 PACKET (EA) ORAL ONCE
Refills: 0 | Status: COMPLETED | OUTPATIENT
Start: 2019-11-16 | End: 2019-11-16

## 2019-11-16 RX ORDER — POTASSIUM CHLORIDE 20 MEQ
40 PACKET (EA) ORAL EVERY 4 HOURS
Refills: 0 | Status: COMPLETED | OUTPATIENT
Start: 2019-11-16 | End: 2019-11-17

## 2019-11-16 RX ADMIN — Medication 2: at 16:57

## 2019-11-16 RX ADMIN — ENOXAPARIN SODIUM 130 MILLIGRAM(S): 100 INJECTION SUBCUTANEOUS at 17:17

## 2019-11-16 RX ADMIN — Medication 2: at 11:54

## 2019-11-16 RX ADMIN — Medication 22 UNIT(S): at 16:57

## 2019-11-16 RX ADMIN — Medication 2: at 08:11

## 2019-11-16 RX ADMIN — OXYCODONE HYDROCHLORIDE 5 MILLIGRAM(S): 5 TABLET ORAL at 12:30

## 2019-11-16 RX ADMIN — Medication 20 UNIT(S): at 08:11

## 2019-11-16 RX ADMIN — Medication 100 MILLIGRAM(S): at 17:17

## 2019-11-16 RX ADMIN — INSULIN GLARGINE 35 UNIT(S): 100 INJECTION, SOLUTION SUBCUTANEOUS at 23:01

## 2019-11-16 RX ADMIN — Medication 30 MILLIGRAM(S): at 14:43

## 2019-11-16 RX ADMIN — OXYCODONE HYDROCHLORIDE 5 MILLIGRAM(S): 5 TABLET ORAL at 11:30

## 2019-11-16 RX ADMIN — Medication 12 UNIT(S): at 11:54

## 2019-11-16 RX ADMIN — INSULIN GLARGINE 35 UNIT(S): 100 INJECTION, SOLUTION SUBCUTANEOUS at 08:12

## 2019-11-16 RX ADMIN — Medication 100 MILLIGRAM(S): at 05:37

## 2019-11-16 RX ADMIN — Medication 30 MILLIGRAM(S): at 05:32

## 2019-11-16 RX ADMIN — Medication 40 MILLIEQUIVALENT(S): at 23:01

## 2019-11-16 RX ADMIN — ENOXAPARIN SODIUM 130 MILLIGRAM(S): 100 INJECTION SUBCUTANEOUS at 05:32

## 2019-11-16 RX ADMIN — Medication 30 MILLIGRAM(S): at 23:01

## 2019-11-16 RX ADMIN — Medication 100 MILLIGRAM(S): at 05:32

## 2019-11-16 RX ADMIN — Medication 0.6 MILLIGRAM(S): at 11:22

## 2019-11-16 RX ADMIN — Medication 20 MILLIEQUIVALENT(S): at 12:18

## 2019-11-16 RX ADMIN — Medication 81 MILLIGRAM(S): at 11:22

## 2019-11-16 RX ADMIN — BUMETANIDE 2 MILLIGRAM(S): 0.25 INJECTION INTRAMUSCULAR; INTRAVENOUS at 05:32

## 2019-11-16 NOTE — PHYSICAL THERAPY INITIAL EVALUATION ADULT - CRITERIA FOR SKILLED THERAPEUTIC INTERVENTIONS
anticipated equipment needs at discharge/impairments found/functional limitations in following categories/predicted duration of therapy intervention/rehab potential/therapy frequency/anticipated discharge recommendation/risk reduction/prevention

## 2019-11-16 NOTE — PROGRESS NOTE ADULT - ASSESSMENT
64 year old male with Hypertension, Hyperlipidemia, Atrial Fibrillation, s/p DCCV, Coronary Artery Disease, NSTEMI, (2/2014), s/p PCI to mid LAD, Proximal LCx, Distal LCx (2/14, [facility], [facility]), Congestive Heart Failure, Normal Ejection Fraction, Diabetes Mellitus, CVA, Severe Left Internal Carotid Disease, s/p CEA (2/2014) presenting with worsening SOB, cough.     1.Right-sided Pleural effusion, Hydropneumothorax  -s/p right pleural pigtail catheter, cont drainage-serosanguinous fluid   -s/p right thoracentesis 11/8/0219, bloody exudate  -cyto negative for malignant cells  -chest tube removed  -awaiting transfer to [facility] for planned decortication/pleurex by thoracic sx      2. Chronic Congestive heart failure, Diastolic.   -stable,   -continue bumex, metol  -recent echo with no evidence of pericardial effusion, mod LAE, grossly borderline LV sys dysfx EF 45% likely underestimated due to AF     3. Atrial Fibrillation, chronic   -rates improving today   -c/w metoprolol succinate ER 100mg daily  -c/w cardizem 30 mg PO TID   -s/p pigtail placement  -Resume a/c w lovenix in anticipation for surgery early next week    4. Coronary Artery Disease. S/P PCI to LAD, LCx.  -stable, Continue ASA 81mg and statin    5. Acute gout , elevated uric acid  -improved  -rheum f/u    dvt ppx

## 2019-11-16 NOTE — PROGRESS NOTE ADULT - SUBJECTIVE AND OBJECTIVE BOX
CC: chest tube removed, awatitng transfer to [facility] for decortication by thoracic surgery, no chest pain    TELEMETRY: afib     PHYSICAL EXAM:    T(C): 36.4 (11-16-19 @ 04:14), Max: 36.9 (11-15-19 @ 08:52)  HR: 99 (11-16-19 @ 05:28) (74 - 107)  BP: 133/74 (11-16-19 @ 05:28) (108/55 - 152/61)  RR: 18 (11-16-19 @ 04:14) (18 - 18)  SpO2: 97% (11-16-19 @ 04:14) (85% - 97%)  Wt(kg): --  I&O's Summary    15 Nov 2019 07:01  -  16 Nov 2019 07:00  --------------------------------------------------------  IN: 1040 mL / OUT: 2200 mL / NET: -1160 mL        Appearance: Normal	  Cardiovascular: irregular S1 S2,RRR, No JVD, No murmurs  Respiratory: dec bs right base  Gastrointestinal:  Soft, Non-tender, + BS	  Extremities: trace edema  Vascular: Peripheral pulses palpable 2+ bilaterally     LABS:	 	                          13.9   9.82  )-----------( 252      ( 15 Nov 2019 08:03 )             46.1     11-16    139  |  90<L>  |  66<H>  ----------------------------<  183<H>  3.4<L>   |  31  |  2.10<H>    Ca    9.3      16 Nov 2019 06:42    MEDICATIONS  (STANDING):  aspirin enteric coated 81 milliGRAM(s) Oral daily  buMETAnide 2 milliGRAM(s) Oral daily  colchicine 0.6 milliGRAM(s) Oral daily  dextrose 5%. 1000 milliLiter(s) (50 mL/Hr) IV Continuous <Continuous>  dextrose 50% Injectable 12.5 Gram(s) IV Push once  dextrose 50% Injectable 25 Gram(s) IV Push once  dextrose 50% Injectable 25 Gram(s) IV Push once  diltiazem    Tablet 30 milliGRAM(s) Oral three times a day  enoxaparin Injectable 130 milliGRAM(s) SubCutaneous two times a day  influenza   Vaccine 0.5 milliLiter(s) IntraMuscular once  insulin glargine Injectable (LANTUS) 35 Unit(s) SubCutaneous at bedtime  insulin glargine Injectable (LANTUS) 35 Unit(s) SubCutaneous every morning  insulin lispro (HumaLOG) corrective regimen sliding scale   SubCutaneous at bedtime  insulin lispro (HumaLOG) corrective regimen sliding scale   SubCutaneous three times a day before meals  insulin lispro Injectable (HumaLOG) 22 Unit(s) SubCutaneous before dinner  insulin lispro Injectable (HumaLOG) 20 Unit(s) SubCutaneous before breakfast  insulin lispro Injectable (HumaLOG) 12 Unit(s) SubCutaneous before lunch  metolazone 2.5 milliGRAM(s) Oral daily  metoprolol succinate  milliGRAM(s) Oral daily  pregabalin 100 milliGRAM(s) Oral two times a day  senna 2 Tablet(s) Oral at bedtime          CARDIAC MARKERS:

## 2019-11-16 NOTE — PHYSICAL THERAPY INITIAL EVALUATION ADULT - PLANNED THERAPY INTERVENTIONS, PT EVAL
transfer training/bed mobility training/balance training/strengthening/Pt will negotiate 1 flight of stairs indepenedently in 2 weeks./gait training

## 2019-11-16 NOTE — PROGRESS NOTE ADULT - SUBJECTIVE AND OBJECTIVE BOX
Subjective: Pt states" " Denies any CP, SOB, palpitations. No acute events overnight.    Vital Signs:  Vital Signs Last 24 Hrs  T(C): 36.4 (11-16-19 @ 14:41), Max: 36.7 (11-15-19 @ 20:29)  T(F): 97.5 (11-16-19 @ 14:41), Max: 98.1 (11-15-19 @ 20:29)  HR: 82 (11-16-19 @ 14:41) (80 - 99)  BP: 120/69 (11-16-19 @ 14:41) (108/55 - 152/61)  RR: 18 (11-16-19 @ 14:41) (18 - 18)  SpO2: 98% (11-16-19 @ 14:41) (94% - 98%) on (O2)        Relevant labs, radiology and Medications reviewed                        15.3   8.48  )-----------( 237      ( 16 Nov 2019 10:37 )             50.9     11-16    139  |  90<L>  |  66<H>  ----------------------------<  183<H>  3.4<L>   |  31  |  2.10<H>    Ca    9.3      16 Nov 2019 06:42        MEDICATIONS  (STANDING):  aspirin enteric coated 81 milliGRAM(s) Oral daily  buMETAnide 2 milliGRAM(s) Oral daily  colchicine 0.6 milliGRAM(s) Oral daily  dextrose 5%. 1000 milliLiter(s) (50 mL/Hr) IV Continuous <Continuous>  dextrose 50% Injectable 12.5 Gram(s) IV Push once  dextrose 50% Injectable 25 Gram(s) IV Push once  dextrose 50% Injectable 25 Gram(s) IV Push once  diltiazem    Tablet 30 milliGRAM(s) Oral three times a day  enoxaparin Injectable 130 milliGRAM(s) SubCutaneous two times a day  influenza   Vaccine 0.5 milliLiter(s) IntraMuscular once  insulin glargine Injectable (LANTUS) 35 Unit(s) SubCutaneous at bedtime  insulin glargine Injectable (LANTUS) 35 Unit(s) SubCutaneous every morning  insulin lispro (HumaLOG) corrective regimen sliding scale   SubCutaneous at bedtime  insulin lispro (HumaLOG) corrective regimen sliding scale   SubCutaneous three times a day before meals  insulin lispro Injectable (HumaLOG) 22 Unit(s) SubCutaneous before dinner  insulin lispro Injectable (HumaLOG) 20 Unit(s) SubCutaneous before breakfast  insulin lispro Injectable (HumaLOG) 12 Unit(s) SubCutaneous before lunch  metolazone 2.5 milliGRAM(s) Oral daily  metoprolol succinate  milliGRAM(s) Oral daily  pregabalin 100 milliGRAM(s) Oral two times a day  senna 2 Tablet(s) Oral at bedtime    MEDICATIONS  (PRN):  acetaminophen   Tablet .. 650 milliGRAM(s) Oral every 4 hours PRN Moderate Pain (4 - 6)  dextrose 40% Gel 15 Gram(s) Oral once PRN Blood Glucose LESS THAN 70 milliGRAM(s)/deciliter  glucagon  Injectable 1 milliGRAM(s) IntraMuscular once PRN Glucose LESS THAN 70 milligrams/deciliter  oxyCODONE    IR 5 milliGRAM(s) Oral two times a day PRN Severe Pain (7 - 10)  sodium chloride 0.65% Nasal 1 Spray(s) Both Nostrils three times a day PRN Nasal Congestion      I&O's Summary    15 Nov 2019 07:01  -  16 Nov 2019 07:00  --------------------------------------------------------  IN: 1040 mL / OUT: 2200 mL / NET: -1160 mL    16 Nov 2019 07:01  -  16 Nov 2019 16:36  --------------------------------------------------------  IN: 725 mL / OUT: 1100 mL / NET: -375 mL        IMAGING    CXR:    CT Chest:    PAST MEDICAL & SURGICAL HISTORY:  Neuropathy  CAD (coronary artery disease)  MI (myocardial infarction): circa 2014  Atrial fibrillation  TIA (transient ischemic attack)  DM type 2 (diabetes mellitus, type 2)  HLD (hyperlipidemia)  HTN (hypertension), benign  S/P carotid endarterectomy: left  Status post angioplasty with stent: LULU x 3 2/7/2014       Physical Exam:  Neurology: A&Ox3, nonfocal, ROA x 4, NAD  Respiratory: B/L BS CTA, diminished at bases, No wheezing, rales, rhonchi  CV: RRR, S1S2

## 2019-11-16 NOTE — PHYSICAL THERAPY INITIAL EVALUATION ADULT - PERTINENT HX OF CURRENT PROBLEM, REHAB EVAL
64 year old male with CKD Hypertension, Hyperlipidemia, Atrial Fibrillation, s/p DCCV, Coronary Artery Disease, NSTEMI, (2/2014), s/p PCI, Congestive Heart Failure,  Diabetes Mellitus, CVA, Severe Left Internal Carotid Disease, s/p CEA (2/2014) recently admitted for hydropneumothorax, a drainage of effusion and sampling of fluid and analysis was suggested however patient refused at that time.  Pt. presents with worsening SOB and cough with worsening pl. effusion

## 2019-11-16 NOTE — PROGRESS NOTE ADULT - ASSESSMENT
64 year old male with HTN, HLD, Afib on pradaxa, s/p DCCV, Coronary Artery Disease, NSTEMI, (2/2014), s/p PCI to mid LAD, Proximal LCx, Distal LCx (2/14, [facility], LULU), CHF w/ preserved EF, DM2, CVA, L carotid stenosis, s/p CEA (2/2014) p/w with increased SOB and cough.  Pt was recently admitted 10/16 found to have hydropneumothorax where he was offered drainage of effusion but patient deferred for outpatient follow up so he could attend his son's wedding. Since readmission: 11/5 CT increase from 10/16 CT in partially loculated moderate right pleural effusion w/ compressive atelectasis w/o pneumothorax. 11/8 thoracentisis by IR w/ 490cc blood tinged fluid found to be exudative effusion. 11/8 CT s/p IR thoracentesis w/ decreased pleural effusion w/o pneumothorax. 11/12 pigtail catheter placed by IR w/ 1L serosang/bloody fluid drained. 11/12 CXR w/ resolution of right pleural effusion. 11/13 small right hydropneumothorax.    11/16: transfer to [facility] on Sunday under Dr. Camargo's service

## 2019-11-16 NOTE — PROGRESS NOTE ADULT - SUBJECTIVE AND OBJECTIVE BOX
[facility]   [phone]  RENAL FOLLOW UP NOTE  --------------------------------------------------------------------------------  Chief Complaint:    24 hour events/subjective:    seen earlier. still with sob but overall better per pt . knee pain is better   daughter at bedside     PAST HISTORY  --------------------------------------------------------------------------------  No significant changes to PMH, PSH, FHx, SHx, unless otherwise noted    ALLERGIES & MEDICATIONS  --------------------------------------------------------------------------------  Allergies    No Known Allergies    Intolerances      Standing Inpatient Medications  aspirin enteric coated 81 milliGRAM(s) Oral daily  buMETAnide 2 milliGRAM(s) Oral daily  colchicine 0.6 milliGRAM(s) Oral daily  dextrose 5%. 1000 milliLiter(s) IV Continuous <Continuous>  dextrose 50% Injectable 12.5 Gram(s) IV Push once  dextrose 50% Injectable 25 Gram(s) IV Push once  dextrose 50% Injectable 25 Gram(s) IV Push once  diltiazem    Tablet 30 milliGRAM(s) Oral three times a day  enoxaparin Injectable 130 milliGRAM(s) SubCutaneous two times a day  influenza   Vaccine 0.5 milliLiter(s) IntraMuscular once  insulin glargine Injectable (LANTUS) 35 Unit(s) SubCutaneous at bedtime  insulin glargine Injectable (LANTUS) 35 Unit(s) SubCutaneous every morning  insulin lispro (HumaLOG) corrective regimen sliding scale   SubCutaneous at bedtime  insulin lispro (HumaLOG) corrective regimen sliding scale   SubCutaneous three times a day before meals  insulin lispro Injectable (HumaLOG) 22 Unit(s) SubCutaneous before dinner  insulin lispro Injectable (HumaLOG) 20 Unit(s) SubCutaneous before breakfast  insulin lispro Injectable (HumaLOG) 12 Unit(s) SubCutaneous before lunch  metolazone 2.5 milliGRAM(s) Oral daily  metoprolol succinate  milliGRAM(s) Oral daily  potassium chloride    Tablet ER 40 milliEquivalent(s) Oral every 4 hours  pregabalin 100 milliGRAM(s) Oral two times a day  senna 2 Tablet(s) Oral at bedtime    PRN Inpatient Medications  acetaminophen   Tablet .. 650 milliGRAM(s) Oral every 4 hours PRN  dextrose 40% Gel 15 Gram(s) Oral once PRN  glucagon  Injectable 1 milliGRAM(s) IntraMuscular once PRN  oxyCODONE    IR 5 milliGRAM(s) Oral two times a day PRN  sodium chloride 0.65% Nasal 1 Spray(s) Both Nostrils three times a day PRN      REVIEW OF SYSTEMS  --------------------------------------------------------------------------------    Gen: denies  fevers/chills,  CVS: denies chest pain/palpitations  Resp: denies worsening SOB/Cough  GI: Denies N/V/Abd pain  : Denies dysuria/oliguria/hematuria    All other systems were reviewed and are negative, except as noted.    VITALS/PHYSICAL EXAM  --------------------------------------------------------------------------------  T(C): 36.7 (11-16-19 @ 16:20), Max: 36.7 (11-15-19 @ 20:29)  HR: 94 (11-16-19 @ 16:20) (80 - 99)  BP: 126/61 (11-16-19 @ 16:20) (108/55 - 152/61)  RR: 18 (11-16-19 @ 18:39) (18 - 18)  SpO2: 92% (11-16-19 @ 18:39) (92% - 98%)  Wt(kg): --        11-15-19 @ 07:01  -  11-16-19 @ 07:00  --------------------------------------------------------  IN: 1040 mL / OUT: 2200 mL / NET: -1160 mL    11-16-19 @ 07:01  -  11-16-19 @ 20:01  --------------------------------------------------------  IN: 975 mL / OUT: 1100 mL / NET: -125 mL      Physical Exam:  	  Gen:  + obese appears comfortable  on O2    	no jvd   	Pulm: decrease bs  R base upto 3/4  no rales or ronchi or wheezing   	CV: RRR, S1S2; no rub  	Abd: +BS, soft, nontender/nondistended  	: No suprapubic tenderness  	UE: Warm, no cyanosis  no clubbing,  no edema  	LE: Warm, no cyanosis  no clubbing, no edema	    	    LABS/STUDIES  --------------------------------------------------------------------------------              15.3   8.48  >-----------<  237      [11-16-19 @ 10:37]              50.9     139  |  90  |  66  ----------------------------<  183      [11-16-19 @ 06:42]  3.4   |  31  |  2.10        Ca     9.3     [11-16-19 @ 06:42]          Uric acid 12.0      [11-15-19 @ 08:03]    Creatinine Trend:  SCr 2.10 [11-16 @ 06:42]  SCr 2.08 [11-15 @ 08:03]  SCr 2.17 [11-14 @ 06:24]  SCr 1.79 [11-13 @ 06:43]  SCr 1.78 [11-12 @ 06:22]                  HbA1c 11.7      [11-07-19 @ 09:14]  TSH 1.71      [12-28-18 @ 14:58]

## 2019-11-16 NOTE — PROGRESS NOTE ADULT - PROBLEM SELECTOR PLAN 1
Patient seen and examined with Dr. Camargo  transfer to [facility] for possible decortication/pleurx catheter placement. Pt agreeing to transfer.

## 2019-11-16 NOTE — PROGRESS NOTE ADULT - PROBLEM SELECTOR PROBLEM 1
Hydropneumothorax
Pleural effusion
Type 2 diabetes mellitus with hyperglycemia, with long-term current use of insulin

## 2019-11-16 NOTE — PROGRESS NOTE ADULT - SUBJECTIVE AND OBJECTIVE BOX
CHIEF COMPLAINT:Patient is a 64y old  Male who presents with a chief complaint of Gout  PLeural effusion (15 Nov 2019 16:07)    	        PAST MEDICAL & SURGICAL HISTORY:  Neuropathy  CAD (coronary artery disease)  MI (myocardial infarction): circa 2014  Atrial fibrillation  TIA (transient ischemic attack)  DM type 2 (diabetes mellitus, type 2)  HLD (hyperlipidemia)  HTN (hypertension), benign  S/P carotid endarterectomy: left  Status post angioplasty with stent: LULU x 3 2/7/2014          REVIEW OF SYSTEMS:  CONSTITUTIONAL: No fever, weight loss, or fatigue  EYES: No eye pain, visual disturbances, or discharge  NECK: No pain or stiffness  RESPIRATORYbreathing stable   CARDIOVASCULAR: No chest pain, palpitations, passing out, dizziness,   GASTROINTESTINAL: No abdominal or epigastric pain. No nausea, vomiting, or hematemesis; No diarrhea or constipation. No melena or hematochezia.  GENITOURINARY: No dysuria, frequency, hematuria, or incontinence  NEUROLOGICAL: No headaches, memory loss, loss of strength, numbness, or tremors  knee pain much improved    Medications:  MEDICATIONS  (STANDING):  aspirin enteric coated 81 milliGRAM(s) Oral daily  buMETAnide 2 milliGRAM(s) Oral daily  colchicine 0.6 milliGRAM(s) Oral daily  dextrose 5%. 1000 milliLiter(s) (50 mL/Hr) IV Continuous <Continuous>  dextrose 50% Injectable 12.5 Gram(s) IV Push once  dextrose 50% Injectable 25 Gram(s) IV Push once  dextrose 50% Injectable 25 Gram(s) IV Push once  diltiazem    Tablet 30 milliGRAM(s) Oral three times a day  enoxaparin Injectable 130 milliGRAM(s) SubCutaneous two times a day  influenza   Vaccine 0.5 milliLiter(s) IntraMuscular once  insulin glargine Injectable (LANTUS) 35 Unit(s) SubCutaneous at bedtime  insulin glargine Injectable (LANTUS) 35 Unit(s) SubCutaneous every morning  insulin lispro (HumaLOG) corrective regimen sliding scale   SubCutaneous at bedtime  insulin lispro (HumaLOG) corrective regimen sliding scale   SubCutaneous three times a day before meals  insulin lispro Injectable (HumaLOG) 22 Unit(s) SubCutaneous before dinner  insulin lispro Injectable (HumaLOG) 20 Unit(s) SubCutaneous before breakfast  insulin lispro Injectable (HumaLOG) 12 Unit(s) SubCutaneous before lunch  metolazone 2.5 milliGRAM(s) Oral daily  metoprolol succinate  milliGRAM(s) Oral daily  potassium chloride    Tablet ER 20 milliEquivalent(s) Oral once  pregabalin 100 milliGRAM(s) Oral two times a day  senna 2 Tablet(s) Oral at bedtime    MEDICATIONS  (PRN):  acetaminophen   Tablet .. 650 milliGRAM(s) Oral every 4 hours PRN Moderate Pain (4 - 6)  dextrose 40% Gel 15 Gram(s) Oral once PRN Blood Glucose LESS THAN 70 milliGRAM(s)/deciliter  glucagon  Injectable 1 milliGRAM(s) IntraMuscular once PRN Glucose LESS THAN 70 milligrams/deciliter  oxyCODONE    IR 5 milliGRAM(s) Oral two times a day PRN Severe Pain (7 - 10)  sodium chloride 0.65% Nasal 1 Spray(s) Both Nostrils three times a day PRN Nasal Congestion    	    PHYSICAL EXAM:  T(C): 36.4 (11-16-19 @ 04:14), Max: 36.7 (11-15-19 @ 20:29)  HR: 99 (11-16-19 @ 05:28) (80 - 107)  BP: 133/74 (11-16-19 @ 05:28) (108/55 - 152/61)  RR: 18 (11-16-19 @ 04:14) (18 - 18)  SpO2: 97% (11-16-19 @ 04:14) (85% - 97%)  Wt(kg): --  I&O's Summary    15 Nov 2019 07:01  -  16 Nov 2019 07:00  --------------------------------------------------------  IN: 1040 mL / OUT: 2200 mL / NET: -1160 mL    16 Nov 2019 07:01  -  16 Nov 2019 09:27  --------------------------------------------------------  IN: 350 mL / OUT: 800 mL / NET: -450 mL        Appearance: Normal	  HEENT:   Normal oral mucosa, PERRL, EOMI	  Lymphatic: No lymphadenopathy  Cardiovascular: Normal S1 S2, No JVD,  Respiratory:dec bs r>l  Psychiatry: A & O x 3, Mood & affect appropriate  Gastrointestinal:  Soft, Non-tender, + BS	  Skin: No rashes, No ecchymoses, No cyanosis	  Neurologic: Non-focal  Extremities: l knee with inc rom and dec tenderness  Vascular: Peripheral pulses palpable 2+ bilaterally    TELEMETRY: 	    ECG:  	  RADIOLOGY:  OTHER: 	  	  LABS:	 	    CARDIAC MARKERS:                                13.9   9.82  )-----------( 252      ( 15 Nov 2019 08:03 )             46.1     11-16    139  |  90<L>  |  66<H>  ----------------------------<  183<H>  3.4<L>   |  31  |  2.10<H>    Ca    9.3      16 Nov 2019 06:42      proBNP:   Lipid Profile:   HgA1c:   TSH:

## 2019-11-16 NOTE — PROGRESS NOTE ADULT - ASSESSMENT
63 y/o M with PMH of CAD s/p stents, HFrEF (EF 40% 12/2018), DMT2, HTN, CKD, NSTEMI, Afib (on Pradaxa). Presents to ED with worsening SOB and productive cough with .   pt  recently hospitalized last month and found to have small R hydropneumothorax of unclear etiology. Plans previous admission for drainage of effusion and sampling of fluid and analysis, but patient refused at that time and agreed for outpt f/u. presents now w/ sob       Problem: Hydropneumothorax.   CT from previous admission with R sided hydropneumothorax of unclear etiology.  s/p thorocentesis/c/w exudate  s/p pigtail cath   s/p  ct  removed  pulm f/u noted   thorc sx eval noted  transfer tomorrow for decor/procedure       ·  Problem: CHF (congestive heart failure).  Recommendation: -Keep O>I as tolerated.   c/w current meds  cards eval noted    dm /uncontrolled  endo eval noted  fsg riss  c/w insulin regimen as per endo        dm neuropathy  c/w lyrica  oxycodone for severe pain     alex/ ckd  renal f/u   c/w recs as per renal     l knee pain improved  gout likely   discussed w/ renal  colcrys added  uric acid noted     hypokalemia  supp k+

## 2019-11-17 ENCOUNTER — INPATIENT (INPATIENT)
Facility: HOSPITAL | Age: 64
LOS: 3 days | Discharge: HOME | End: 2019-11-21
Attending: THORACIC SURGERY (CARDIOTHORACIC VASCULAR SURGERY) | Admitting: THORACIC SURGERY (CARDIOTHORACIC VASCULAR SURGERY)
Payer: COMMERCIAL

## 2019-11-17 ENCOUNTER — TRANSCRIPTION ENCOUNTER (OUTPATIENT)
Age: 64
End: 2019-11-17

## 2019-11-17 VITALS
HEART RATE: 89 BPM | RESPIRATION RATE: 18 BRPM | DIASTOLIC BLOOD PRESSURE: 63 MMHG | OXYGEN SATURATION: 98 % | SYSTOLIC BLOOD PRESSURE: 114 MMHG | TEMPERATURE: 98 F

## 2019-11-17 VITALS
DIASTOLIC BLOOD PRESSURE: 66 MMHG | SYSTOLIC BLOOD PRESSURE: 135 MMHG | RESPIRATION RATE: 18 BRPM | TEMPERATURE: 98 F | HEART RATE: 81 BPM | OXYGEN SATURATION: 94 %

## 2019-11-17 DIAGNOSIS — Z95.5 PRESENCE OF CORONARY ANGIOPLASTY IMPLANT AND GRAFT: Chronic | ICD-10-CM

## 2019-11-17 DIAGNOSIS — Z98.89 OTHER SPECIFIED POSTPROCEDURAL STATES: Chronic | ICD-10-CM

## 2019-11-17 LAB
ALBUMIN SERPL ELPH-MCNC: 2.9 G/DL — LOW (ref 3.3–5)
ALP SERPL-CCNC: 146 U/L — HIGH (ref 40–120)
ALT FLD-CCNC: 8 U/L — SIGNIFICANT CHANGE UP (ref 4–41)
ANION GAP SERPL CALC-SCNC: 13 MMO/L — SIGNIFICANT CHANGE UP (ref 7–14)
ANION GAP SERPL CALC-SCNC: 17 MMOL/L — SIGNIFICANT CHANGE UP (ref 5–17)
APTT BLD: 36.8 SEC — HIGH (ref 27.5–36.3)
AST SERPL-CCNC: 18 U/L — SIGNIFICANT CHANGE UP (ref 4–40)
BASOPHILS # BLD AUTO: 0.05 K/UL — SIGNIFICANT CHANGE UP (ref 0–0.2)
BASOPHILS NFR BLD AUTO: 0.6 % — SIGNIFICANT CHANGE UP (ref 0–2)
BILIRUB DIRECT SERPL-MCNC: < 0.2 MG/DL — SIGNIFICANT CHANGE UP (ref 0.1–0.2)
BILIRUB SERPL-MCNC: 0.3 MG/DL — SIGNIFICANT CHANGE UP (ref 0.2–1.2)
BUN SERPL-MCNC: 66 MG/DL — HIGH (ref 7–23)
BUN SERPL-MCNC: 74 MG/DL — HIGH (ref 7–23)
CALCIUM SERPL-MCNC: 9.1 MG/DL — SIGNIFICANT CHANGE UP (ref 8.4–10.5)
CALCIUM SERPL-MCNC: 9.4 MG/DL — SIGNIFICANT CHANGE UP (ref 8.4–10.5)
CHLORIDE SERPL-SCNC: 89 MMOL/L — LOW (ref 96–108)
CHLORIDE SERPL-SCNC: 92 MMOL/L — LOW (ref 98–107)
CO2 SERPL-SCNC: 30 MMOL/L — SIGNIFICANT CHANGE UP (ref 22–31)
CO2 SERPL-SCNC: 32 MMOL/L — HIGH (ref 22–31)
CREAT SERPL-MCNC: 1.87 MG/DL — HIGH (ref 0.5–1.3)
CREAT SERPL-MCNC: 1.89 MG/DL — HIGH (ref 0.5–1.3)
EOSINOPHIL # BLD AUTO: 0.22 K/UL — SIGNIFICANT CHANGE UP (ref 0–0.5)
EOSINOPHIL NFR BLD AUTO: 2.8 % — SIGNIFICANT CHANGE UP (ref 0–6)
GLUCOSE BLDC GLUCOMTR-MCNC: 188 MG/DL — HIGH (ref 70–99)
GLUCOSE BLDC GLUCOMTR-MCNC: 208 MG/DL — HIGH (ref 70–99)
GLUCOSE BLDC GLUCOMTR-MCNC: 232 MG/DL — HIGH (ref 70–99)
GLUCOSE SERPL-MCNC: 227 MG/DL — HIGH (ref 70–99)
GLUCOSE SERPL-MCNC: 243 MG/DL — HIGH (ref 70–99)
HCT VFR BLD CALC: 48.2 % — SIGNIFICANT CHANGE UP (ref 39–50)
HCT VFR BLD CALC: 49.1 % — SIGNIFICANT CHANGE UP (ref 39–50)
HGB BLD-MCNC: 14.7 G/DL — SIGNIFICANT CHANGE UP (ref 13–17)
HGB BLD-MCNC: 14.7 G/DL — SIGNIFICANT CHANGE UP (ref 13–17)
IMM GRANULOCYTES NFR BLD AUTO: 0.9 % — SIGNIFICANT CHANGE UP (ref 0–1.5)
INR BLD: 1.09 — SIGNIFICANT CHANGE UP (ref 0.88–1.17)
LYMPHOCYTES # BLD AUTO: 1.22 K/UL — SIGNIFICANT CHANGE UP (ref 1–3.3)
LYMPHOCYTES # BLD AUTO: 15.6 % — SIGNIFICANT CHANGE UP (ref 13–44)
MCHC RBC-ENTMCNC: 25 PG — LOW (ref 27–34)
MCHC RBC-ENTMCNC: 25.3 PG — LOW (ref 27–34)
MCHC RBC-ENTMCNC: 29.9 GM/DL — LOW (ref 32–36)
MCHC RBC-ENTMCNC: 30.5 % — LOW (ref 32–36)
MCV RBC AUTO: 83 FL — SIGNIFICANT CHANGE UP (ref 80–100)
MCV RBC AUTO: 83.6 FL — SIGNIFICANT CHANGE UP (ref 80–100)
MONOCYTES # BLD AUTO: 0.76 K/UL — SIGNIFICANT CHANGE UP (ref 0–0.9)
MONOCYTES NFR BLD AUTO: 9.7 % — SIGNIFICANT CHANGE UP (ref 2–14)
NEUTROPHILS # BLD AUTO: 5.52 K/UL — SIGNIFICANT CHANGE UP (ref 1.8–7.4)
NEUTROPHILS NFR BLD AUTO: 70.4 % — SIGNIFICANT CHANGE UP (ref 43–77)
NRBC # FLD: 0 K/UL — SIGNIFICANT CHANGE UP (ref 0–0)
PLATELET # BLD AUTO: 268 K/UL — SIGNIFICANT CHANGE UP (ref 150–400)
PLATELET # BLD AUTO: 295 K/UL — SIGNIFICANT CHANGE UP (ref 150–400)
PMV BLD: 11.1 FL — SIGNIFICANT CHANGE UP (ref 7–13)
POTASSIUM SERPL-MCNC: 3.7 MMOL/L — SIGNIFICANT CHANGE UP (ref 3.5–5.3)
POTASSIUM SERPL-MCNC: 4.3 MMOL/L — SIGNIFICANT CHANGE UP (ref 3.5–5.3)
POTASSIUM SERPL-SCNC: 3.7 MMOL/L — SIGNIFICANT CHANGE UP (ref 3.5–5.3)
POTASSIUM SERPL-SCNC: 4.3 MMOL/L — SIGNIFICANT CHANGE UP (ref 3.5–5.3)
PROT SERPL-MCNC: 7.4 G/DL — SIGNIFICANT CHANGE UP (ref 6–8.3)
PROTHROM AB SERPL-ACNC: 12.1 SEC — SIGNIFICANT CHANGE UP (ref 9.8–13.1)
RBC # BLD: 5.81 M/UL — HIGH (ref 4.2–5.8)
RBC # BLD: 5.87 M/UL — HIGH (ref 4.2–5.8)
RBC # FLD: 15.4 % — HIGH (ref 10.3–14.5)
RBC # FLD: 15.7 % — HIGH (ref 10.3–14.5)
SODIUM SERPL-SCNC: 136 MMOL/L — SIGNIFICANT CHANGE UP (ref 135–145)
SODIUM SERPL-SCNC: 137 MMOL/L — SIGNIFICANT CHANGE UP (ref 135–145)
WBC # BLD: 7.84 K/UL — SIGNIFICANT CHANGE UP (ref 3.8–10.5)
WBC # BLD: 9.25 K/UL — SIGNIFICANT CHANGE UP (ref 3.8–10.5)
WBC # FLD AUTO: 7.84 K/UL — SIGNIFICANT CHANGE UP (ref 3.8–10.5)
WBC # FLD AUTO: 9.25 K/UL — SIGNIFICANT CHANGE UP (ref 3.8–10.5)

## 2019-11-17 PROCEDURE — 87075 CULTR BACTERIA EXCEPT BLOOD: CPT

## 2019-11-17 PROCEDURE — 83615 LACTATE (LD) (LDH) ENZYME: CPT

## 2019-11-17 PROCEDURE — 88305 TISSUE EXAM BY PATHOLOGIST: CPT

## 2019-11-17 PROCEDURE — 32555 ASPIRATE PLEURA W/ IMAGING: CPT

## 2019-11-17 PROCEDURE — 84550 ASSAY OF BLOOD/URIC ACID: CPT

## 2019-11-17 PROCEDURE — 89051 BODY FLUID CELL COUNT: CPT

## 2019-11-17 PROCEDURE — 71046 X-RAY EXAM CHEST 2 VIEWS: CPT

## 2019-11-17 PROCEDURE — 83036 HEMOGLOBIN GLYCOSYLATED A1C: CPT

## 2019-11-17 PROCEDURE — 93010 ELECTROCARDIOGRAM REPORT: CPT

## 2019-11-17 PROCEDURE — C1729: CPT

## 2019-11-17 PROCEDURE — 87070 CULTURE OTHR SPECIMN AEROBIC: CPT

## 2019-11-17 PROCEDURE — 73562 X-RAY EXAM OF KNEE 3: CPT

## 2019-11-17 PROCEDURE — 82803 BLOOD GASES ANY COMBINATION: CPT

## 2019-11-17 PROCEDURE — 97161 PT EVAL LOW COMPLEX 20 MIN: CPT

## 2019-11-17 PROCEDURE — 32557 INSERT CATH PLEURA W/ IMAGE: CPT

## 2019-11-17 PROCEDURE — 87102 FUNGUS ISOLATION CULTURE: CPT

## 2019-11-17 PROCEDURE — 83880 ASSAY OF NATRIURETIC PEPTIDE: CPT

## 2019-11-17 PROCEDURE — 71045 X-RAY EXAM CHEST 1 VIEW: CPT

## 2019-11-17 PROCEDURE — 84157 ASSAY OF PROTEIN OTHER: CPT

## 2019-11-17 PROCEDURE — 85610 PROTHROMBIN TIME: CPT

## 2019-11-17 PROCEDURE — 82945 GLUCOSE OTHER FLUID: CPT

## 2019-11-17 PROCEDURE — 93005 ELECTROCARDIOGRAM TRACING: CPT

## 2019-11-17 PROCEDURE — 82570 ASSAY OF URINE CREATININE: CPT

## 2019-11-17 PROCEDURE — 82962 GLUCOSE BLOOD TEST: CPT

## 2019-11-17 PROCEDURE — 87205 SMEAR GRAM STAIN: CPT

## 2019-11-17 PROCEDURE — 83986 ASSAY PH BODY FLUID NOS: CPT

## 2019-11-17 PROCEDURE — 85730 THROMBOPLASTIN TIME PARTIAL: CPT

## 2019-11-17 PROCEDURE — 88112 CYTOPATH CELL ENHANCE TECH: CPT

## 2019-11-17 PROCEDURE — 82042 OTHER SOURCE ALBUMIN QUAN EA: CPT

## 2019-11-17 PROCEDURE — 85027 COMPLETE CBC AUTOMATED: CPT

## 2019-11-17 PROCEDURE — 99285 EMERGENCY DEPT VISIT HI MDM: CPT | Mod: 25

## 2019-11-17 PROCEDURE — 80053 COMPREHEN METABOLIC PANEL: CPT

## 2019-11-17 PROCEDURE — 71250 CT THORAX DX C-: CPT

## 2019-11-17 PROCEDURE — 84156 ASSAY OF PROTEIN URINE: CPT

## 2019-11-17 PROCEDURE — 84484 ASSAY OF TROPONIN QUANT: CPT

## 2019-11-17 PROCEDURE — 80048 BASIC METABOLIC PNL TOTAL CA: CPT

## 2019-11-17 PROCEDURE — C1769: CPT

## 2019-11-17 RX ORDER — INSULIN ASPART 100 [IU]/ML
0 INJECTION, SOLUTION SUBCUTANEOUS
Qty: 0 | Refills: 0 | DISCHARGE

## 2019-11-17 RX ORDER — IPRATROPIUM BROMIDE 0.2 MG/ML
500 SOLUTION, NON-ORAL INHALATION ONCE
Refills: 0 | Status: COMPLETED | OUTPATIENT
Start: 2019-11-17 | End: 2019-11-17

## 2019-11-17 RX ORDER — METOPROLOL TARTRATE 50 MG
2 TABLET ORAL
Qty: 0 | Refills: 0 | DISCHARGE

## 2019-11-17 RX ORDER — DEXTROSE 50 % IN WATER 50 %
12.5 SYRINGE (ML) INTRAVENOUS ONCE
Refills: 0 | Status: DISCONTINUED | OUTPATIENT
Start: 2019-11-17 | End: 2019-11-21

## 2019-11-17 RX ORDER — DILTIAZEM HCL 120 MG
30 CAPSULE, EXT RELEASE 24 HR ORAL THREE TIMES A DAY
Refills: 0 | Status: DISCONTINUED | OUTPATIENT
Start: 2019-11-17 | End: 2019-11-21

## 2019-11-17 RX ORDER — OXYCODONE HYDROCHLORIDE 5 MG/1
5 TABLET ORAL
Refills: 0 | Status: DISCONTINUED | OUTPATIENT
Start: 2019-11-17 | End: 2019-11-21

## 2019-11-17 RX ORDER — BUMETANIDE 0.25 MG/ML
1 INJECTION INTRAMUSCULAR; INTRAVENOUS ONCE
Refills: 0 | Status: COMPLETED | OUTPATIENT
Start: 2019-11-17 | End: 2019-11-17

## 2019-11-17 RX ORDER — DEXTROSE 50 % IN WATER 50 %
25 SYRINGE (ML) INTRAVENOUS ONCE
Refills: 0 | Status: DISCONTINUED | OUTPATIENT
Start: 2019-11-17 | End: 2019-11-21

## 2019-11-17 RX ORDER — GLUCAGON INJECTION, SOLUTION 0.5 MG/.1ML
1 INJECTION, SOLUTION SUBCUTANEOUS ONCE
Refills: 0 | Status: DISCONTINUED | OUTPATIENT
Start: 2019-11-17 | End: 2019-11-21

## 2019-11-17 RX ORDER — HEPARIN SODIUM 5000 [USP'U]/ML
10000 INJECTION INTRAVENOUS; SUBCUTANEOUS EVERY 6 HOURS
Refills: 0 | Status: DISCONTINUED | OUTPATIENT
Start: 2019-11-17 | End: 2019-11-21

## 2019-11-17 RX ORDER — SODIUM CHLORIDE 9 MG/ML
1000 INJECTION, SOLUTION INTRAVENOUS
Refills: 0 | Status: DISCONTINUED | OUTPATIENT
Start: 2019-11-17 | End: 2019-11-21

## 2019-11-17 RX ORDER — HEPARIN SODIUM 5000 [USP'U]/ML
5000 INJECTION INTRAVENOUS; SUBCUTANEOUS EVERY 6 HOURS
Refills: 0 | Status: DISCONTINUED | OUTPATIENT
Start: 2019-11-17 | End: 2019-11-21

## 2019-11-17 RX ORDER — INSULIN LISPRO 100/ML
16 VIAL (ML) SUBCUTANEOUS
Refills: 0 | Status: DISCONTINUED | OUTPATIENT
Start: 2019-11-17 | End: 2019-11-21

## 2019-11-17 RX ORDER — SENNA PLUS 8.6 MG/1
2 TABLET ORAL AT BEDTIME
Refills: 0 | Status: DISCONTINUED | OUTPATIENT
Start: 2019-11-17 | End: 2019-11-21

## 2019-11-17 RX ORDER — OXYCODONE HYDROCHLORIDE 5 MG/1
1 TABLET ORAL
Qty: 0 | Refills: 0 | DISCHARGE
Start: 2019-11-17

## 2019-11-17 RX ORDER — METOPROLOL TARTRATE 50 MG
1 TABLET ORAL
Qty: 0 | Refills: 0 | DISCHARGE
Start: 2019-11-17

## 2019-11-17 RX ORDER — ACETAMINOPHEN 500 MG
650 TABLET ORAL EVERY 4 HOURS
Refills: 0 | Status: DISCONTINUED | OUTPATIENT
Start: 2019-11-17 | End: 2019-11-21

## 2019-11-17 RX ORDER — DILTIAZEM HCL 120 MG
1 CAPSULE, EXT RELEASE 24 HR ORAL
Qty: 0 | Refills: 0 | DISCHARGE
Start: 2019-11-17

## 2019-11-17 RX ORDER — ACETAMINOPHEN 500 MG
2 TABLET ORAL
Qty: 0 | Refills: 0 | DISCHARGE

## 2019-11-17 RX ORDER — BUMETANIDE 0.25 MG/ML
2 INJECTION INTRAMUSCULAR; INTRAVENOUS DAILY
Refills: 0 | Status: DISCONTINUED | OUTPATIENT
Start: 2019-11-17 | End: 2019-11-21

## 2019-11-17 RX ORDER — ASPIRIN/CALCIUM CARB/MAGNESIUM 324 MG
81 TABLET ORAL DAILY
Refills: 0 | Status: DISCONTINUED | OUTPATIENT
Start: 2019-11-17 | End: 2019-11-21

## 2019-11-17 RX ORDER — INSULIN GLARGINE 100 [IU]/ML
25 INJECTION, SOLUTION SUBCUTANEOUS
Qty: 0 | Refills: 0 | DISCHARGE

## 2019-11-17 RX ORDER — INSULIN LISPRO 100/ML
16 VIAL (ML) SUBCUTANEOUS ONCE
Refills: 0 | Status: COMPLETED | OUTPATIENT
Start: 2019-11-17 | End: 2019-11-17

## 2019-11-17 RX ORDER — INSULIN LISPRO 100/ML
12 VIAL (ML) SUBCUTANEOUS
Refills: 0 | Status: DISCONTINUED | OUTPATIENT
Start: 2019-11-17 | End: 2019-11-21

## 2019-11-17 RX ORDER — INSULIN LISPRO 100/ML
VIAL (ML) SUBCUTANEOUS AT BEDTIME
Refills: 0 | Status: DISCONTINUED | OUTPATIENT
Start: 2019-11-17 | End: 2019-11-21

## 2019-11-17 RX ORDER — COLCHICINE 0.6 MG
1 TABLET ORAL
Qty: 0 | Refills: 0 | DISCHARGE
Start: 2019-11-17

## 2019-11-17 RX ORDER — INSULIN LISPRO 100/ML
VIAL (ML) SUBCUTANEOUS
Refills: 0 | Status: DISCONTINUED | OUTPATIENT
Start: 2019-11-17 | End: 2019-11-21

## 2019-11-17 RX ORDER — ASPIRIN/CALCIUM CARB/MAGNESIUM 324 MG
1 TABLET ORAL
Qty: 0 | Refills: 0 | DISCHARGE
Start: 2019-11-17

## 2019-11-17 RX ORDER — DABIGATRAN ETEXILATE MESYLATE 150 MG/1
1 CAPSULE ORAL
Qty: 0 | Refills: 0 | DISCHARGE

## 2019-11-17 RX ORDER — ACETAMINOPHEN 500 MG
2 TABLET ORAL
Qty: 0 | Refills: 0 | DISCHARGE
Start: 2019-11-17

## 2019-11-17 RX ORDER — INSULIN GLARGINE 100 [IU]/ML
35 INJECTION, SOLUTION SUBCUTANEOUS AT BEDTIME
Refills: 0 | Status: DISCONTINUED | OUTPATIENT
Start: 2019-11-17 | End: 2019-11-21

## 2019-11-17 RX ORDER — SODIUM CHLORIDE 0.65 %
1 AEROSOL, SPRAY (ML) NASAL THREE TIMES A DAY
Refills: 0 | Status: DISCONTINUED | OUTPATIENT
Start: 2019-11-17 | End: 2019-11-21

## 2019-11-17 RX ORDER — SODIUM CHLORIDE 0.65 %
1 AEROSOL, SPRAY (ML) NASAL
Qty: 0 | Refills: 0 | DISCHARGE
Start: 2019-11-17

## 2019-11-17 RX ORDER — DEXTROSE 50 % IN WATER 50 %
15 SYRINGE (ML) INTRAVENOUS ONCE
Refills: 0 | Status: DISCONTINUED | OUTPATIENT
Start: 2019-11-17 | End: 2019-11-21

## 2019-11-17 RX ORDER — BUMETANIDE 0.25 MG/ML
1 INJECTION INTRAMUSCULAR; INTRAVENOUS
Qty: 0 | Refills: 0 | DISCHARGE
Start: 2019-11-17

## 2019-11-17 RX ORDER — SENNA PLUS 8.6 MG/1
2 TABLET ORAL
Qty: 0 | Refills: 0 | DISCHARGE
Start: 2019-11-17

## 2019-11-17 RX ORDER — INSULIN GLARGINE 100 [IU]/ML
35 INJECTION, SOLUTION SUBCUTANEOUS AT BEDTIME
Refills: 0 | Status: DISCONTINUED | OUTPATIENT
Start: 2019-11-17 | End: 2019-11-17

## 2019-11-17 RX ORDER — METOPROLOL TARTRATE 50 MG
2 TABLET ORAL
Qty: 0 | Refills: 0 | DISCHARGE
Start: 2019-11-17

## 2019-11-17 RX ORDER — INSULIN LISPRO 100/ML
10 VIAL (ML) SUBCUTANEOUS
Refills: 0 | Status: DISCONTINUED | OUTPATIENT
Start: 2019-11-17 | End: 2019-11-18

## 2019-11-17 RX ORDER — METOPROLOL TARTRATE 50 MG
100 TABLET ORAL DAILY
Refills: 0 | Status: DISCONTINUED | OUTPATIENT
Start: 2019-11-17 | End: 2019-11-21

## 2019-11-17 RX ORDER — INSULIN GLARGINE 100 [IU]/ML
35 INJECTION, SOLUTION SUBCUTANEOUS EVERY MORNING
Refills: 0 | Status: DISCONTINUED | OUTPATIENT
Start: 2019-11-17 | End: 2019-11-21

## 2019-11-17 RX ORDER — HEPARIN SODIUM 5000 [USP'U]/ML
INJECTION INTRAVENOUS; SUBCUTANEOUS
Qty: 25000 | Refills: 0 | Status: DISCONTINUED | OUTPATIENT
Start: 2019-11-17 | End: 2019-11-21

## 2019-11-17 RX ADMIN — Medication 4: at 08:12

## 2019-11-17 RX ADMIN — Medication 0.6 MILLIGRAM(S): at 11:40

## 2019-11-17 RX ADMIN — Medication 100 MILLIGRAM(S): at 22:10

## 2019-11-17 RX ADMIN — Medication 30 MILLIGRAM(S): at 22:10

## 2019-11-17 RX ADMIN — Medication 100 MILLIGRAM(S): at 04:54

## 2019-11-17 RX ADMIN — INSULIN GLARGINE 35 UNIT(S): 100 INJECTION, SOLUTION SUBCUTANEOUS at 08:13

## 2019-11-17 RX ADMIN — INSULIN GLARGINE 35 UNIT(S): 100 INJECTION, SOLUTION SUBCUTANEOUS at 22:10

## 2019-11-17 RX ADMIN — Medication 81 MILLIGRAM(S): at 11:40

## 2019-11-17 RX ADMIN — OXYCODONE HYDROCHLORIDE 5 MILLIGRAM(S): 5 TABLET ORAL at 18:55

## 2019-11-17 RX ADMIN — BUMETANIDE 2 MILLIGRAM(S): 0.25 INJECTION INTRAMUSCULAR; INTRAVENOUS at 04:54

## 2019-11-17 RX ADMIN — HEPARIN SODIUM 2400 UNIT(S)/HR: 5000 INJECTION INTRAVENOUS; SUBCUTANEOUS at 19:56

## 2019-11-17 RX ADMIN — Medication 40 MILLIEQUIVALENT(S): at 04:53

## 2019-11-17 RX ADMIN — Medication 500 MICROGRAM(S): at 20:25

## 2019-11-17 RX ADMIN — BUMETANIDE 1 MILLIGRAM(S): 0.25 INJECTION INTRAMUSCULAR; INTRAVENOUS at 11:40

## 2019-11-17 RX ADMIN — Medication 12 UNIT(S): at 12:27

## 2019-11-17 RX ADMIN — ENOXAPARIN SODIUM 130 MILLIGRAM(S): 100 INJECTION SUBCUTANEOUS at 04:54

## 2019-11-17 RX ADMIN — Medication 2: at 12:27

## 2019-11-17 RX ADMIN — Medication 20 UNIT(S): at 08:11

## 2019-11-17 RX ADMIN — Medication 30 MILLIGRAM(S): at 04:54

## 2019-11-17 RX ADMIN — OXYCODONE HYDROCHLORIDE 5 MILLIGRAM(S): 5 TABLET ORAL at 19:32

## 2019-11-17 NOTE — CHART NOTE - NSCHARTNOTEFT_GEN_A_CORE
63 y/o male with hx of uncontrolled T2DM c/b neuropathy, CKD3, macrovascular disease (CAD, TIA, left carotid artery stenosis). Also h/o HTN/HLD/Afib/CHF. Here with SOB and cough secondary to right hydropneumothorax > s/p placement of pigtail cath insertion for pleural effusion. Endocrine consult requested for management of hyperglycemia in patient with DM2 A1c >10%.     Patient transferred to [facility] planning for lung surgery, unable to see patient but as per chart check glucose is stable. Continue lantus 35 units BID and Humalog 20/12/22 units TIDAC.   [facility] team will continue to follow.

## 2019-11-17 NOTE — PROGRESS NOTE ADULT - ASSESSMENT
64 year old male with Hypertension, Hyperlipidemia, Atrial Fibrillation, s/p DCCV, Coronary Artery Disease, NSTEMI, (2/2014), s/p PCI to mid LAD, Proximal LCx, Distal LCx (2/14, [facility], LULU), Congestive Heart Failure, Normal Ejection Fraction, Diabetes Mellitus, CVA, Severe Left Internal Carotid Disease, s/p CEA (2/2014) presenting with worsening SOB, cough.     1.Right-sided Pleural effusion, Hydropneumothorax  -s/p right pleural pigtail catheter, cont drainage-serosanguinous fluid   -s/p right thoracentesis 11/8/0219, bloody exudate  -cyto negative for malignant cells  -chest tube removed  -awaiting transfer to [facility] for planned decortication/pleurex by thoracic sx      2. Chronic Congestive heart failure, Diastolic.   -appears mildly overloaded today, will give a prn dose of IV Bumex  -continue bumex, metol  -recent echo with no evidence of pericardial effusion, mod LAE, grossly borderline LV sys dysfx EF 45% likely underestimated due to AF     3. Atrial Fibrillation, chronic   -rates improving today   -c/w metoprolol succinate ER 100mg daily  -c/w cardizem 30 mg PO TID   -s/p pigtail placement  -Resume a/c w lovenox in anticipation for surgery early next week    4. Coronary Artery Disease. S/P PCI to LAD, LCx.  -stable, Continue ASA 81mg and statin    5. Acute gout , elevated uric acid  -improved  -rheum f/u    dvt ppx

## 2019-11-17 NOTE — PROGRESS NOTE ADULT - ASSESSMENT
64 year old male with CKD Hypertension, Hyperlipidemia, Atrial Fibrillation, s/p DCCV, Coronary Artery Disease, NSTEMI, (2/2014), s/p PCI to mid LAD, Proximal LCx, Distal LCx (2/14, [facility], LULU), Congestive Heart Failure,  Diabetes Mellitus, CVA, Severe Left Internal Carotid Disease, s/p CEA (2/2014) recently admitted for hydropneumothorax, a drainage of effusion and sampling of fluid and analysis was suggested however patient refused at that time.  Pt. presents  today with worsening SOB and cough with worsening pl. effusion         1- ckd III   2- proteinuria   3- chf   4- HTN   5- pleural effusion  s/p thoracentesis  6- a fib    creatinine steady   will need arb in near future for proteinuria  post op as cr remains steady   bumex 2 mg po qd   Metolazone 2.5 mg qd   cont toprol xl 100 mg qd  hyperuricemia  acute gout attack colcrys 0.6 mg po for one more day then dc   start allopurinol 200 mg daily in one day

## 2019-11-17 NOTE — PROGRESS NOTE ADULT - SUBJECTIVE AND OBJECTIVE BOX
CC: c/o mild orthopnea and dyspnea    TELEMETRY: af    PHYSICAL EXAM:    T(C): 36.4 (11-17-19 @ 04:19), Max: 36.8 (11-16-19 @ 23:57)  HR: 79 (11-17-19 @ 04:19) (78 - 97)  BP: 155/91 (11-17-19 @ 04:19) (120/69 - 155/91)  RR: 18 (11-17-19 @ 04:19) (18 - 18)  SpO2: 98% (11-17-19 @ 04:19) (92% - 98%)  Wt(kg): --  I&O's Summary    16 Nov 2019 07:01  -  17 Nov 2019 07:00  --------------------------------------------------------  IN: 975 mL / OUT: 1350 mL / NET: -375 mL    17 Nov 2019 07:01  -  17 Nov 2019 10:14  --------------------------------------------------------  IN: 240 mL / OUT: 500 mL / NET: -260 mL        Appearance: Normal	  Cardiovascular: Normal S1 S2,RRR, No JVD, No murmurs  Respiratory: dec bs at the bases  Abdomen: soft, nontender, + BS	  Extremities: 1+ edema  Vascular: Peripheral pulses palpable 2+ bilaterally     LABS:	 	                          14.7   9.25  )-----------( 268      ( 17 Nov 2019 09:26 )             49.1     11-17    136  |  89<L>  |  66<H>  ----------------------------<  227<H>  4.3   |  30  |  1.89<H>    Ca    9.4      17 Nov 2019 07:40            CARDIAC MARKERS:        MEDICATIONS  (STANDING):  aspirin enteric coated 81 milliGRAM(s) Oral daily  buMETAnide 2 milliGRAM(s) Oral daily  colchicine 0.6 milliGRAM(s) Oral daily  dextrose 5%. 1000 milliLiter(s) (50 mL/Hr) IV Continuous <Continuous>  dextrose 50% Injectable 12.5 Gram(s) IV Push once  dextrose 50% Injectable 25 Gram(s) IV Push once  dextrose 50% Injectable 25 Gram(s) IV Push once  diltiazem    Tablet 30 milliGRAM(s) Oral three times a day  enoxaparin Injectable 130 milliGRAM(s) SubCutaneous two times a day  influenza   Vaccine 0.5 milliLiter(s) IntraMuscular once  insulin glargine Injectable (LANTUS) 35 Unit(s) SubCutaneous at bedtime  insulin glargine Injectable (LANTUS) 35 Unit(s) SubCutaneous every morning  insulin lispro (HumaLOG) corrective regimen sliding scale   SubCutaneous at bedtime  insulin lispro (HumaLOG) corrective regimen sliding scale   SubCutaneous three times a day before meals  insulin lispro Injectable (HumaLOG) 22 Unit(s) SubCutaneous before dinner  insulin lispro Injectable (HumaLOG) 20 Unit(s) SubCutaneous before breakfast  insulin lispro Injectable (HumaLOG) 12 Unit(s) SubCutaneous before lunch  metolazone 2.5 milliGRAM(s) Oral daily  metoprolol succinate  milliGRAM(s) Oral daily  pregabalin 100 milliGRAM(s) Oral two times a day  senna 2 Tablet(s) Oral at bedtime

## 2019-11-17 NOTE — CONSULT NOTE ADULT - SUBJECTIVE AND OBJECTIVE BOX
Patient is a 64y old  Male who presents with a chief complaint of     INTERVAL HPI/OVERNIGHT EVENTS:    Medications:MEDICATIONS  (STANDING):    MEDICATIONS  (PRN):      Allergies: Allergies    No Known Allergies    Intolerances          FAMILY HISTORY:  Family history of heart disease        PAST MEDICAL & SURGICAL HISTORY:  Neuropathy  CAD (coronary artery disease)  MI (myocardial infarction): circa 2014  Atrial fibrillation  TIA (transient ischemic attack)  DM type 2 (diabetes mellitus, type 2)  HLD (hyperlipidemia)  HTN (hypertension), benign  S/P carotid endarterectomy: left  Status post angioplasty with stent: LULU x 3 2/7/2014      REVIEW OF SYSTEMS:  CONSTITUTIONAL: No fever, weight loss, or fatigue  EYES: No eye pain, visual disturbances, or discharge  ENMT:  No difficulty hearing, tinnitus, vertigo; No sinus or throat pain  NECK: No pain or stiffness  BREASTS: No pain, masses, or nipple discharge  RESPIRATORY: No cough, wheezing, chills or hemoptysis; No shortness of breath  CARDIOVASCULAR: No chest pain, palpitations, dizziness, or leg swelling  GASTROINTESTINAL: No abdominal or epigastric pain. No nausea, vomiting, or hematemesis; No diarrhea or constipation. No melena or hematochezia.  GENITOURINARY: No dysuria, frequency, hematuria, or incontinence  NEUROLOGICAL: No headaches, memory loss, loss of strength, numbness, or tremors  SKIN: No itching, burning, rashes, or lesions   LYMPH NODES: No enlarged glands  ENDOCRINE: No heat or cold intolerance; No hair loss  MUSCULOSKELETAL: No joint pain or swelling; No muscle, back, or extremity pain  PSYCHIATRIC: No depression, anxiety, mood swings, or difficulty sleeping  HEME/LYMPH: No easy bruising, or bleeding gums  ALLERY AND IMMUNOLOGIC: No hives or eczema    T(C): 36.6 (11-17-19 @ 15:33), Max: 36.8 (11-16-19 @ 23:57)  HR: 81 (11-17-19 @ 15:33) (78 - 97)  BP: 135/66 (11-17-19 @ 15:33) (114/63 - 155/91)  RR: 18 (11-17-19 @ 15:33) (18 - 18)  SpO2: 94% (11-17-19 @ 15:33) (92% - 98%)  Wt(kg): --Vital Signs Last 24 Hrs  T(C): 36.6 (17 Nov 2019 15:33), Max: 36.8 (16 Nov 2019 23:57)  T(F): 97.8 (17 Nov 2019 15:33), Max: 98.2 (16 Nov 2019 23:57)  HR: 81 (17 Nov 2019 15:33) (78 - 97)  BP: 135/66 (17 Nov 2019 15:33) (114/63 - 155/91)  BP(mean): --  RR: 18 (17 Nov 2019 15:33) (18 - 18)  SpO2: 94% (17 Nov 2019 15:33) (92% - 98%)  I&O's Summary    17 Nov 2019 07:01  -  17 Nov 2019 16:00  --------------------------------------------------------  IN: 0 mL / OUT: 400 mL / NET: -400 mL        PHYSICAL EXAM:  GENERAL: NAD, well-groomed, well-developed  HEAD:  Atraumatic, Normocephalic  EYES: EOMI, PERRLA, conjunctiva and sclera clear  ENMT: No tonsillar erythema, exudates, or enlargement; Moist mucous membranes, Good dentition, No lesions  NECK: Supple, No JVD, Normal thyroid  NERVOUS SYSTEM:  Alert & Oriented X3, Good concentration; Motor Strength 5/5 B/L upper and lower extremities; DTRs 2+ intact and symmetric  CHEST/LUNG: Clear to percussion bilaterally; No rales, rhonchi, wheezing, or rubs  HEART: Regular rate and rhythm; No murmurs, rubs, or gallops  ABDOMEN: Soft, Nontender, Nondistended; Bowel sounds present  EXTREMITIES:  2+ Peripheral Pulses, No clubbing, cyanosis, or edema  LYMPH: No lymphadenopathy noted  SKIN: No rashes or lesions    Consultant(s) Notes Reviewed:  [x ] YES  [ ] NO  Care Discussed with Consultants/Other Providerscpk [ x] YES  [ ] NO    LABS:                    CBC Full  -  ( 17 Nov 2019 09:26 )  WBC Count : 9.25 K/uL  RBC Count : 5.87 M/uL  Hemoglobin : 14.7 g/dL  Hematocrit : 49.1 %  Platelet Count - Automated : 268 K/uL  Mean Cell Volume : 83.6 fl  Mean Cell Hemoglobin : 25.0 pg  Mean Cell Hemoglobin Concentration : 29.9 gm/dL  Auto Neutrophil # : x  Auto Lymphocyte # : x  Auto Monocyte # : x  Auto Eosinophil # : x  Auto Basophil # : x  Auto Neutrophil % : x  Auto Lymphocyte % : x  Auto Monocyte % : x  Auto Eosinophil % : x  Auto Basophil % : x      11-17    136  |  89<L>  |  66<H>  ----------------------------<  227<H>  4.3   |  30  |  1.89<H>    Ca    9.4      17 Nov 2019 07:40              RADIOLOGY & ADDITIONAL TESTS:    Imaging Personally Reviewed:  [ ] YES  [ ] NO Patient is a 64y old  Male who presents with a chief complaint of   hpi reviewed  INTERVAL HPI/OVERNIGHT EVENTS:    Medications:MEDICATIONS  (STANDING):    MEDICATIONS  (PRN):      Allergies: Allergies    No Known Allergies    Intolerances          FAMILY HISTORY:  Family history of heart disease        PAST MEDICAL & SURGICAL HISTORY:  Neuropathy  CAD (coronary artery disease)  MI (myocardial infarction): circa 2014  Atrial fibrillation  TIA (transient ischemic attack)  DM type 2 (diabetes mellitus, type 2)  HLD (hyperlipidemia)  HTN (hypertension), benign  S/P carotid endarterectomy: left  Status post angioplasty with stent: LULU x 3 2/7/2014      REVIEW OF SYSTEMS:  CONSTITUTIONAL: No fever, weight loss, or fatigue  EYES: No eye pain, visual disturbances, or discharge  ENMT:  No difficulty hearing, tinnitus, vertigo; No sinus or throat pain  NECK: No pain or stiffness  RESPIRATORY: breathing ok   CARDIOVASCULAR: No chest pain, palpitations, dizziness,  GASTROINTESTINAL: No abdominal or epigastric pain. No nausea, vomiting, or hematemesis; No diarrhea or constipation. No melena or hematochezia.  GENITOURINARY: No dysuria, frequency, hematuria, or incontinence  NEUROLOGICAL: No headaches, memory loss, loss of strength, numbness, or tremors    knee pain better    T(C): 36.6 (11-17-19 @ 15:33), Max: 36.8 (11-16-19 @ 23:57)  HR: 81 (11-17-19 @ 15:33) (78 - 97)  BP: 135/66 (11-17-19 @ 15:33) (114/63 - 155/91)  RR: 18 (11-17-19 @ 15:33) (18 - 18)  SpO2: 94% (11-17-19 @ 15:33) (92% - 98%)  Wt(kg): --Vital Signs Last 24 Hrs  T(C): 36.6 (17 Nov 2019 15:33), Max: 36.8 (16 Nov 2019 23:57)  T(F): 97.8 (17 Nov 2019 15:33), Max: 98.2 (16 Nov 2019 23:57)  HR: 81 (17 Nov 2019 15:33) (78 - 97)  BP: 135/66 (17 Nov 2019 15:33) (114/63 - 155/91)  BP(mean): --  RR: 18 (17 Nov 2019 15:33) (18 - 18)  SpO2: 94% (17 Nov 2019 15:33) (92% - 98%)  I&O's Summary    17 Nov 2019 07:01  -  17 Nov 2019 16:00  --------------------------------------------------------  IN: 0 mL / OUT: 400 mL / NET: -400 mL        PHYSICAL EXAM:  GENERAL: NAD,   HEAD:  Atraumatic, Normocephalic  EYES: EOMI, PERRLA, conjunctiva and sclera clear  ENMT: No tonsillar erythema, exudates, or enlargement; Moist mucous membranes,  NECK: Supple, No JVD, Normal thyroid  NERVOUS SYSTEM:  Alert & Oriented X3, Good concentration; Motor Strength 5/5 B/L upper and lower extremities; DTRs 2+ intact and symmetric  CHEST/LUNG: dec bs   HEART: Regular rate and rhythm; No murmurs, rubs, or gallops  ABDOMEN: Soft, Nontender, Nondistended; Bowel sounds present  EXTREMITIES: pvd  tr edema  SKIN: No rashes or lesions    Consultant(s) Notes Reviewed:  [x ] YES  [ ] NO  Care Discussed with Consultants/Other Providerscpk [ x] YES  [ ] NO    LABS:                    CBC Full  -  ( 17 Nov 2019 09:26 )  WBC Count : 9.25 K/uL  RBC Count : 5.87 M/uL  Hemoglobin : 14.7 g/dL  Hematocrit : 49.1 %  Platelet Count - Automated : 268 K/uL  Mean Cell Volume : 83.6 fl  Mean Cell Hemoglobin : 25.0 pg  Mean Cell Hemoglobin Concentration : 29.9 gm/dL  Auto Neutrophil # : x  Auto Lymphocyte # : x  Auto Monocyte # : x  Auto Eosinophil # : x  Auto Basophil # : x  Auto Neutrophil % : x  Auto Lymphocyte % : x  Auto Monocyte % : x  Auto Eosinophil % : x  Auto Basophil % : x      11-17    136  |  89<L>  |  66<H>  ----------------------------<  227<H>  4.3   |  30  |  1.89<H>    Ca    9.4      17 Nov 2019 07:40              RADIOLOGY & ADDITIONAL TESTS:    Imaging Personally Reviewed:  [ ] YES  [ ] NO

## 2019-11-17 NOTE — H&P ADULT - HISTORY OF PRESENT ILLNESS
pt is a 64 year old male with Hypertension, Hyperlipidemia, Atrial Fibrillation, s/p DCCV, Coronary Artery Disease, NSTEMI, (2/2014), s/p PCI to mid LAD, Proximal LCx, Distal LCx (2/14, [facility], LULU), Congestive Heart Failure,  Diabetes Mellitus, CVA, Severe Left Internal Carotid Disease, s/p CEA (2/2014) recently admitted for hydropneumothorax, a drainage of effusion and sampling of fluid and analysis was suggested however patient refused at that time.   Pt. presenting today with worsening SOB and cough   He was sent in by Dr. El as well as our office for re-peat CT chest and likely drainage of effusion. pt is a 64 year old male with Hypertension, Hyperlipidemia, Atrial Fibrillation, s/p DCCV, Coronary Artery Disease, NSTEMI, (2/2014), s/p PCI to mid LAD, Proximal LCx, Distal LCx (2/14, [facility], LULU), Congestive Heart Failure,  Diabetes Mellitus, CVA, Severe Left Internal Carotid Disease, s/p CEA (2/2014) recently admitted for hydropneumothorax, a drainage of effusion and sampling of fluid and analysis was suggested however patient refused at that time.   Pt. presenting today with worsening SOB and cough   He was sent in by Dr. El as well as our office for re-peat CT chest and likely drainage of effusion.  64 year old male with Hypertension, Hyperlipidemia, Atrial Fibrillation, s/p DCCV, Coronary Artery Disease, NSTEMI, (2/2014), s/p PCI to mid LAD, Proximal LCx, Distal LCx (2/14, [facility], LULU), Congestive Heart Failure, Normal Ejection Fraction, Diabetes Mellitus, CVA, Severe Left Internal Carotid Disease, s/p CEA (2/2014) presenting with worsening SOB, cough found to have right sided pleural effusion, hydropneumothorax s/p thoracentesis on 11/8/19 with bloody exudate; cytology was negative for maligant cells.  Right pleural pigtail catheter placed with serosanguinous drainage.  Pigtail removed on 11/15 and SQ Lovenox to start on 11/16/19 AM. Repeat CT Chest with small loculated right hydropneumothorax containing pigtail cath.  Thoracic surgery was consulted, plans for Right VATs with decortication. Patient with recent TTE with no evidence of pericardial effusion, mod LAE, grossly borderline LV sys dysfx EF 45% likely underestimated due to AF.  During hospital course, patient was acute gout flare, rheumatology consulted, multiple one time doses of colchicine given, patient refusing doses of steroids due to uncontrolled blood sugars.  Endocrine consulted for uncontrolled blood sugars, insulin doses were adjusted accordingly.  Patient stable for transfer to [facility] for Right VATs with decortication. 64 year old male with Hypertension, Hyperlipidemia, Atrial Fibrillation, s/p DCCV, Coronary Artery Disease, NSTEMI, (2/2014), s/p PCI to mid LAD, Proximal LCx, Distal LCx (2/14, [facility], LULU), Congestive Heart Failure, Normal Ejection Fraction, Diabetes Mellitus, CVA, Severe Left Internal Carotid Disease, s/p CEA (2/2014) presenting with worsening SOB, cough found to have right sided pleural effusion, hydropneumothorax s/p thoracentesis on 11/8/19 with bloody exudate; cytology was negative for maligant cells.  Right pleural pigtail catheter placed with serosanguinous drainage.  Pigtail removed on 11/15 and SQ Lovenox to start on 11/16/19 AM. Repeat CT Chest with small loculated right hydropneumothorax containing pigtail cath.  Thoracic surgery was consulted, plans for Right VATs with decortication. Patient with recent TTE with no evidence of pericardial effusion, mod LAE, grossly borderline LV sys dysfx EF 45% likely underestimated due to AF.  During hospital course, patient was acute gout flare, rheumatology consulted, multiple one time doses of colchicine given, patient refusing doses of steroids due to uncontrolled blood sugars.  Endocrine consulted for uncontrolled blood sugars, insulin doses were adjusted accordingly.  Patient stable for transfer to [facility] for Right VATs with decortication.   Patient transferred to [facility] on 11/17/19

## 2019-11-17 NOTE — H&P ADULT - ASSESSMENT
65 y/o M with PMH of CAD s/p stents, HFrEF (EF 40% 12/2018), DMT2, HTN, CKD, NSTEMI, Afib (on Pradaxa). Presents to ED with worsening SOB and productive cough with .   pt  recently hospitalized last month and found to have small R hydropneumothorax of unclear etiology. Plans previous admission for drainage of effusion and sampling of fluid and analysis, but patient refused at that time and agreed for outpt f/u. presents now w/ sob       Problem: Hydropneumothorax.   CT from previous admission with R sided hydropneumothorax of unclear etiology.  -Pt refused drainage at that time.   CT chest non contrast.  ir drainage  hold pradaxa       ·  Problem: CHF (congestive heart failure).  Recommendation: -Keep O>I as tolerated.   c/w current meds  cards eval    dm   fsg riss  c/w lantus      dm neuropathy  c/w lyrica  oxycodone for severe pain 63 y/o M with PMH of CAD s/p stents, HFrEF (EF 40% 12/2018), DMT2, HTN, CKD, NSTEMI, Afib (on Pradaxa).     Problem: Hydropneumothorax.   Decortication/PleurX catheter plmt as per Dr. Camargo  Prelim- not Mon or Tues    AF-hold Lovenox  Start Heparin gtt     CHF (congestive heart failure).  Recommendation: -Keep O>I as tolerated.   c/w current meds  cont diuretics    DM  fsg riss  c/w lantus/humalog    DM neuropathy  c/w lyrica  oxycodone for severe pain

## 2019-11-17 NOTE — DISCHARGE NOTE NURSING/CASE MANAGEMENT/SOCIAL WORK - PATIENT PORTAL LINK FT
You can access the FollowMyHealth Patient Portal offered by [facility] by registering at the following website: [email or web address]. By joining [employer]’s FollowMyHealth portal, you will also be able to view your health information using other applications (apps) compatible with our system.

## 2019-11-17 NOTE — H&P ADULT - NSHPPHYSICALEXAM_GEN_ALL_CORE
Appearance: Normal	  Cardiovascular: Normal S1 S2,RRR, No JVD, No murmurs  Respiratory: decr BS at the bases  Abdomen: soft, nontender, + BS	  Extremities: 1+ edema  Vascular: Peripheral pulses palpable 2+ bilaterally

## 2019-11-18 LAB
ANION GAP SERPL CALC-SCNC: 12 MMO/L — SIGNIFICANT CHANGE UP (ref 7–14)
APTT BLD: 163.4 SEC — CRITICAL HIGH (ref 27.5–36.3)
APTT BLD: 47.6 SEC — HIGH (ref 27.5–36.3)
APTT BLD: 74.5 SEC — HIGH (ref 27.5–36.3)
BLD GP AB SCN SERPL QL: NEGATIVE — SIGNIFICANT CHANGE UP
BUN SERPL-MCNC: 64 MG/DL — HIGH (ref 7–23)
CALCIUM SERPL-MCNC: 9.5 MG/DL — SIGNIFICANT CHANGE UP (ref 8.4–10.5)
CHLORIDE SERPL-SCNC: 91 MMOL/L — LOW (ref 98–107)
CO2 SERPL-SCNC: 33 MMOL/L — HIGH (ref 22–31)
CREAT SERPL-MCNC: 1.68 MG/DL — HIGH (ref 0.5–1.3)
GLUCOSE BLDC GLUCOMTR-MCNC: 139 MG/DL — HIGH (ref 70–99)
GLUCOSE BLDC GLUCOMTR-MCNC: 154 MG/DL — HIGH (ref 70–99)
GLUCOSE BLDC GLUCOMTR-MCNC: 188 MG/DL — HIGH (ref 70–99)
GLUCOSE BLDC GLUCOMTR-MCNC: 252 MG/DL — HIGH (ref 70–99)
GLUCOSE SERPL-MCNC: 272 MG/DL — HIGH (ref 70–99)
HCT VFR BLD CALC: 45.9 % — SIGNIFICANT CHANGE UP (ref 39–50)
HGB BLD-MCNC: 14 G/DL — SIGNIFICANT CHANGE UP (ref 13–17)
MCHC RBC-ENTMCNC: 25.5 PG — LOW (ref 27–34)
MCHC RBC-ENTMCNC: 30.5 % — LOW (ref 32–36)
MCV RBC AUTO: 83.8 FL — SIGNIFICANT CHANGE UP (ref 80–100)
NRBC # FLD: 0 K/UL — SIGNIFICANT CHANGE UP (ref 0–0)
PLATELET # BLD AUTO: 280 K/UL — SIGNIFICANT CHANGE UP (ref 150–400)
PMV BLD: 11.8 FL — SIGNIFICANT CHANGE UP (ref 7–13)
POTASSIUM SERPL-MCNC: 3.7 MMOL/L — SIGNIFICANT CHANGE UP (ref 3.5–5.3)
POTASSIUM SERPL-SCNC: 3.7 MMOL/L — SIGNIFICANT CHANGE UP (ref 3.5–5.3)
RBC # BLD: 5.48 M/UL — SIGNIFICANT CHANGE UP (ref 4.2–5.8)
RBC # FLD: 15.5 % — HIGH (ref 10.3–14.5)
RH IG SCN BLD-IMP: POSITIVE — SIGNIFICANT CHANGE UP
SODIUM SERPL-SCNC: 136 MMOL/L — SIGNIFICANT CHANGE UP (ref 135–145)
WBC # BLD: 8.8 K/UL — SIGNIFICANT CHANGE UP (ref 3.8–10.5)
WBC # FLD AUTO: 8.8 K/UL — SIGNIFICANT CHANGE UP (ref 3.8–10.5)

## 2019-11-18 PROCEDURE — 99233 SBSQ HOSP IP/OBS HIGH 50: CPT

## 2019-11-18 PROCEDURE — 99232 SBSQ HOSP IP/OBS MODERATE 35: CPT

## 2019-11-18 RX ORDER — INSULIN LISPRO 100/ML
15 VIAL (ML) SUBCUTANEOUS
Refills: 0 | Status: DISCONTINUED | OUTPATIENT
Start: 2019-11-19 | End: 2019-11-20

## 2019-11-18 RX ORDER — NYSTATIN CREAM 100000 [USP'U]/G
1 CREAM TOPICAL EVERY 12 HOURS
Refills: 0 | Status: DISCONTINUED | OUTPATIENT
Start: 2019-11-18 | End: 2019-11-21

## 2019-11-18 RX ADMIN — Medication 12 UNIT(S): at 12:49

## 2019-11-18 RX ADMIN — Medication 100 MILLIGRAM(S): at 05:22

## 2019-11-18 RX ADMIN — Medication 30 MILLIGRAM(S): at 21:10

## 2019-11-18 RX ADMIN — Medication 81 MILLIGRAM(S): at 12:50

## 2019-11-18 RX ADMIN — Medication 10 UNIT(S): at 08:48

## 2019-11-18 RX ADMIN — Medication 30 MILLIGRAM(S): at 12:50

## 2019-11-18 RX ADMIN — OXYCODONE HYDROCHLORIDE 5 MILLIGRAM(S): 5 TABLET ORAL at 14:43

## 2019-11-18 RX ADMIN — HEPARIN SODIUM 2300 UNIT(S)/HR: 5000 INJECTION INTRAVENOUS; SUBCUTANEOUS at 21:08

## 2019-11-18 RX ADMIN — HEPARIN SODIUM 0 UNIT(S)/HR: 5000 INJECTION INTRAVENOUS; SUBCUTANEOUS at 12:33

## 2019-11-18 RX ADMIN — Medication 100 MILLIGRAM(S): at 18:09

## 2019-11-18 RX ADMIN — Medication 16 UNIT(S): at 18:05

## 2019-11-18 RX ADMIN — INSULIN GLARGINE 35 UNIT(S): 100 INJECTION, SOLUTION SUBCUTANEOUS at 10:04

## 2019-11-18 RX ADMIN — HEPARIN SODIUM 2300 UNIT(S)/HR: 5000 INJECTION INTRAVENOUS; SUBCUTANEOUS at 13:40

## 2019-11-18 RX ADMIN — HEPARIN SODIUM 2700 UNIT(S)/HR: 5000 INJECTION INTRAVENOUS; SUBCUTANEOUS at 03:38

## 2019-11-18 RX ADMIN — Medication 30 MILLIGRAM(S): at 05:22

## 2019-11-18 RX ADMIN — INSULIN GLARGINE 35 UNIT(S): 100 INJECTION, SOLUTION SUBCUTANEOUS at 22:11

## 2019-11-18 RX ADMIN — OXYCODONE HYDROCHLORIDE 5 MILLIGRAM(S): 5 TABLET ORAL at 13:43

## 2019-11-18 RX ADMIN — Medication 100 MILLIGRAM(S): at 21:10

## 2019-11-18 RX ADMIN — Medication 1: at 18:04

## 2019-11-18 RX ADMIN — HEPARIN SODIUM 5000 UNIT(S): 5000 INJECTION INTRAVENOUS; SUBCUTANEOUS at 03:39

## 2019-11-18 RX ADMIN — Medication 3: at 12:49

## 2019-11-18 RX ADMIN — BUMETANIDE 2 MILLIGRAM(S): 0.25 INJECTION INTRAMUSCULAR; INTRAVENOUS at 05:22

## 2019-11-18 NOTE — PROGRESS NOTE ADULT - PROBLEM SELECTOR PLAN 1
- Check BG AC and HS  - C/w Lantus 35 units bid   - Hyperglycemia noted prelunch - will increase Humalog to 15 units before breakfast. Can continue Humalog 12 units prelunch and 16 units predinner.  - C/w Humalog low correction scale AC meals and low scale HS

## 2019-11-18 NOTE — PROGRESS NOTE ADULT - SUBJECTIVE AND OBJECTIVE BOX
Subjective:  no acute complaints  pt on heparin gtt for afib, transferred from [facility] for possible decortication  followed by cardiology and internal medicine    Vital Signs:  Vital Signs Last 24 Hrs  T(C): 36.2 (11-18-19 @ 11:45), Max: 36.7 (11-18-19 @ 00:26)  T(F): 97.1 (11-18-19 @ 11:45), Max: 98 (11-18-19 @ 00:26)  HR: 106 (11-18-19 @ 11:45) (81 - 106)  BP: 118/53 (11-18-19 @ 11:45) (117/65 - 138/75)  RR: 18 (11-18-19 @ 11:45) (18 - 19)  SpO2: 96% (11-18-19 @ 11:45) (94% - 99%) on (O2)    Telemetry/Alarms:  General: WN/WD NAD  Neurology: Awake, nonfocal, ROA x 4  Eyes: Scleras clear, PERRLA/ EOMI, Gross vision intact  ENT:Gross hearing intact, grossly patent pharynx, no stridor  Neck: Neck supple, trachea midline, No JVD,   Respiratory: CTA B/L, No wheezing, rales, rhonchi  CV: RRR, S1S2, no murmurs, rubs or gallops  Abdominal: Soft, NT, ND +BS,   Extremities: No edema, + peripheral pulses  Skin: No Rashes, Hematoma, Ecchymosis  Lymphatic: No Neck, axilla, groin LAD  Psych: Oriented x 3, normal affect    Relevant labs, radiology and Medications reviewed                        14.0   8.80  )-----------( 280      ( 18 Nov 2019 02:15 )             45.9     11-18    136  |  91<L>  |  64<H>  ----------------------------<  272<H>  3.7   |  33<H>  |  1.68<H>    Ca    9.5      18 Nov 2019 11:31    TPro  7.4  /  Alb  2.9<L>  /  TBili  0.3  /  DBili  < 0.2  /  AST  18  /  ALT  8   /  AlkPhos  146<H>  11-17    PT/INR - ( 17 Nov 2019 18:25 )   PT: 12.1 SEC;   INR: 1.09          PTT - ( 18 Nov 2019 11:31 )  PTT:163.4 SEC  MEDICATIONS  (STANDING):  aspirin enteric coated 81 milliGRAM(s) Oral daily  buMETAnide 2 milliGRAM(s) Oral daily  dextrose 5%. 1000 milliLiter(s) (50 mL/Hr) IV Continuous <Continuous>  dextrose 50% Injectable 12.5 Gram(s) IV Push once  dextrose 50% Injectable 25 Gram(s) IV Push once  dextrose 50% Injectable 25 Gram(s) IV Push once  diltiazem    Tablet 30 milliGRAM(s) Oral three times a day  heparin  Infusion.  Unit(s)/Hr (24 mL/Hr) IV Continuous <Continuous>  insulin glargine Injectable (LANTUS) 35 Unit(s) SubCutaneous every morning  insulin glargine Injectable (LANTUS) 35 Unit(s) SubCutaneous at bedtime  insulin lispro (HumaLOG) corrective regimen sliding scale   SubCutaneous three times a day before meals  insulin lispro (HumaLOG) corrective regimen sliding scale   SubCutaneous at bedtime  insulin lispro Injectable (HumaLOG) 10 Unit(s) SubCutaneous before breakfast  insulin lispro Injectable (HumaLOG) 12 Unit(s) SubCutaneous before lunch  insulin lispro Injectable (HumaLOG) 16 Unit(s) SubCutaneous before dinner  metolazone 2.5 milliGRAM(s) Oral daily  metoprolol succinate  milliGRAM(s) Oral daily  pregabalin 100 milliGRAM(s) Oral two times a day  senna 2 Tablet(s) Oral at bedtime    MEDICATIONS  (PRN):  acetaminophen   Tablet .. 650 milliGRAM(s) Oral every 4 hours PRN Temp greater or equal to 38.5C (101.3F), Mild Pain (1 - 3)  dextrose 40% Gel 15 Gram(s) Oral once PRN Blood Glucose LESS THAN 70 milliGRAM(s)/deciliter  glucagon  Injectable 1 milliGRAM(s) IntraMuscular once PRN Glucose LESS THAN 70 milligrams/deciliter  heparin  Injectable 62708 Unit(s) IV Push every 6 hours PRN For aPTT less than 40  heparin  Injectable 5000 Unit(s) IV Push every 6 hours PRN For aPTT between 40 - 57  oxyCODONE    IR 5 milliGRAM(s) Oral two times a day PRN Severe Pain (7 - 10)  sodium chloride 0.65% Nasal 1 Spray(s) Both Nostrils three times a day PRN Nasal Congestion    Pertinent Physical Exam  I&O's Summary    17 Nov 2019 07:01  -  18 Nov 2019 07:00  --------------------------------------------------------  IN: 300 mL / OUT: 1400 mL / NET: -1100 mL    18 Nov 2019 07:01  -  18 Nov 2019 14:06  --------------------------------------------------------  IN: 0 mL / OUT: 1300 mL / NET: -1300 mL        Assessment   64 year old male with Hypertension, Hyperlipidemia, Atrial Fibrillation, s/p DCCV, Coronary Artery Disease, NSTEMI, (2/2014), s/p PCI to mid LAD, Proximal LCx, Distal LCx (2/14, [facility], LULU), Congestive Heart Failure, Normal Ejection Fraction, Diabetes Mellitus, CVA, Severe Left Internal Carotid Disease, s/p CEA (2/2014) presenting with worsening SOB, cough found to have recurrent right sided pleural effusion and hydropneumothorax  Pt has thoracentesis 11/8  and Pigtail catheter placed that was removed 11/5   Thoracic surgery was consulted, plans for Right VATs with decortication.  Patient with recent TTE with no evidence of pericardial effusion, mod LAE, grossly borderline LV sys dysfx EF 45% likely underestimated due to AF.   During hospital course, patient was acute gout flare, rheumatology consulted, multiple one time doses of colchicine given, patient refusing doses of steroids due to uncontrolled blood sugars.    Endocrine consulted for uncontrolled blood sugars, insulin doses were adjusted accordingly.  Patient stable for transfer to [facility] for Right VATs with decortication.   Patient transferred to [facility] on 11/17/19    PLAN  Neuro: Pain management  Pulm: Encourage coughing, deep breathing and use of incentive spirometry. Wean off supplemental oxygen as able. Daily CXR.   Cardio: Monitor telemetry/alarms  GI: Tolerating diet. Continue stool softeners.  Renal: monitor urine output, supplement electrolytes as needed  Vasc: Heparin SC/SCDs for DVT prophylaxis  Heme: Stable H/H. .   ID: Off antibiotics. Stable.  Therapy: OOB/ambulate    Disposition: awaiting evaluation by Dr Camargo for possible OR this week  Discussed with Cardiothoracic Team at AM rounds.

## 2019-11-18 NOTE — PROGRESS NOTE ADULT - PROBLEM SELECTOR PROBLEM 1
PHYSICIAN NEXT STEPS:  Call the Patient    CHIEF COMPLAINT:  Chief Complaint/Protocol Used: Critical Value  Onset: today      ASSESSMENT:  ? Onset: today  -------------------------------------------------------    DISPOSITION:  Disposition Recommendation: Unspecified  Patient Directed To: Unspecified  Patient Intended Action: Unspecified    CALL NOTES:  10/17/2019 at 10:31 PM by Cary Lin  ? critical lab reported by Martine  ACl lab. Perfect serve message sent to covering provider for PA.     DISPOSITION OVERRIDE/PROVIDER CONSULT:  Disposition Override: N/A  Override Source: Unspecified  Consulted with PCP: No  Consulted with On-Call Physician: No    CALLER CONTACT INFO:  Name: Papito Colemanjuvenciosydney (Self)  Phone 1: [phone] (Work Phone)  Phone 2: [phone] (Mobile)  Phone 3: [phone] (Home Phone)      ENCOUNTER STARTED:  10/17/19 10:21:12 PM  ENCOUNTER ASSIGNED TO/CLOSED BY:  Cary Riley @ 10/17/19 10:31:46 PM      -------------------------------------------------------    UNDERSTANDS CARE ADVICE: No    AGREES WITH CARE ADVICE: No    WILL FOLLOW CARE ADVICE: No    UNDERSTANDS CARE ADVICE: No    AGREES WITH CARE ADVICE: No    WILL FOLLOW CARE ADVICE: No    -------------------------------------------------------   Type 2 diabetes mellitus with hyperglycemia, with long-term current use of insulin

## 2019-11-18 NOTE — CONSULT NOTE ADULT - ASSESSMENT
64 year old male with Hypertension, Hyperlipidemia, Atrial Fibrillation, s/p DCCV, Coronary Artery Disease, NSTEMI, (2/2014), s/p PCI to mid LAD, Proximal LCx, Distal LCx (2/14, [facility], LULU), Congestive Heart Failure, Normal Ejection Fraction, Diabetes Mellitus, CVA, Severe Left Internal Carotid Disease, s/p CEA (2/2014) transfer to [facility] for Right VATs with decortication.     1.Right-sided Pleural effusion, Hydropneumothorax  -s/p right pleural pigtail catheter, cont drainage-serosanguinous fluid   -s/p right thoracentesis 11/8/0219, bloody exudate  -cyto negative for malignant cells  -chest tube removed  Thoracic surgery to f/u       2. Chronic Congestive heart failure, Diastolic.   -volume status stable  -continue bumex, metol  -recent echo with no evidence of pericardial effusion, mod LAE, grossly borderline LV sys dysfx EF 45% likely underestimated due to AF     3. Atrial Fibrillation, chronic   -rates 90s- 11-  -c/w metoprolol succinate ER 100mg daily  -c/w cardizem 30 mg PO TID   -a/c on hep gtt    4. Coronary Artery Disease. S/P PCI to LAD, LCx.  -stable, Continue ASA 81mg and statin    5. Acute gout , elevated uric acid  -improved  -rheum f/u    dvt ppx
65 y/o M with PMH of CAD s/p stents, HFrEF (EF 40% 12/2018), DMT2, HTN, CKD, NSTEMI, Afib (on Pradaxa). Presents to ED with worsening SOB and productive cough with .   pt  recently hospitalized last month and found to have small R hydropneumothorax of unclear etiology. Plans previous admission for drainage of effusion and sampling of fluid and analysis, but patient refused at that time and agreed for outpt f/u. presented  w/ sob       Problem: Hydropneumothorax.   CT from previous admission with R sided hydropneumothorax of unclear etiology.  s/p thorocentesis/c/w exudate  s/p pigtail cath   s/p  ct  removed  pulm f/u noted   thorc sx eval noted  transferred for thoracic sx       ·  Problem: CHF (congestive heart failure).  Recommendation: -Keep O>I as tolerated.   c/w current meds  cards eval noted    dm /uncontrolled  fsg riss  c/w insulin regimen as per endo in ns        dm neuropathy  c/w lyrica  oxycodone for severe pain     alex/ ckd resolved   renal f/u if needed    l knee pain improved  gout likely   discussed w/ renal  colcrys added  uric acid noted     hypokalemia  supp k+

## 2019-11-18 NOTE — PROGRESS NOTE ADULT - SUBJECTIVE AND OBJECTIVE BOX
CHIEF COMPLAINT:Patient is a 64y old  Male who presents with a chief complaint of   	        PAST MEDICAL & SURGICAL HISTORY:  Neuropathy  CAD (coronary artery disease)  MI (myocardial infarction): circa 2014  Atrial fibrillation  TIA (transient ischemic attack)  DM type 2 (diabetes mellitus, type 2)  HLD (hyperlipidemia)  HTN (hypertension), benign  S/P carotid endarterectomy: left  Status post angioplasty with stent: LULU x 3 2/7/2014          REVIEW OF SYSTEMS:  CONSTITUTIONAL: No fever, weight loss, or fatigue  EYES: No eye pain, visual disturbances, or discharge  NECK: No pain or stiffness  RESPIRATORY: No cough, wheezing, chills or hemoptysis; No Shortness of Breath  CARDIOVASCULAR: No chest pain, palpitations, passing out, dizziness, or leg swelling  GASTROINTESTINAL: No abdominal or epigastric pain. No nausea, vomiting, or hematemesis; No diarrhea or constipation. No melena or hematochezia.  GENITOURINARY: No dysuria, frequency, hematuria, or incontinence  NEUROLOGICAL: No headaches, memory loss, loss of strength, numbness, or tremors  SKIN: No itching, burning, rashes, or lesions   LYMPH Nodes: No enlarged glands  ENDOCRINE: No heat or cold intolerance; No hair loss  MUSCULOSKELETAL: No joint pain or swelling; No muscle, back, or extremity pain    Medications:  MEDICATIONS  (STANDING):  aspirin enteric coated 81 milliGRAM(s) Oral daily  buMETAnide 2 milliGRAM(s) Oral daily  dextrose 5%. 1000 milliLiter(s) (50 mL/Hr) IV Continuous <Continuous>  dextrose 50% Injectable 12.5 Gram(s) IV Push once  dextrose 50% Injectable 25 Gram(s) IV Push once  dextrose 50% Injectable 25 Gram(s) IV Push once  diltiazem    Tablet 30 milliGRAM(s) Oral three times a day  heparin  Infusion.  Unit(s)/Hr (24 mL/Hr) IV Continuous <Continuous>  insulin glargine Injectable (LANTUS) 35 Unit(s) SubCutaneous every morning  insulin glargine Injectable (LANTUS) 35 Unit(s) SubCutaneous at bedtime  insulin lispro (HumaLOG) corrective regimen sliding scale   SubCutaneous three times a day before meals  insulin lispro (HumaLOG) corrective regimen sliding scale   SubCutaneous at bedtime  insulin lispro Injectable (HumaLOG) 10 Unit(s) SubCutaneous before breakfast  insulin lispro Injectable (HumaLOG) 12 Unit(s) SubCutaneous before lunch  insulin lispro Injectable (HumaLOG) 16 Unit(s) SubCutaneous before dinner  metolazone 2.5 milliGRAM(s) Oral daily  metoprolol succinate  milliGRAM(s) Oral daily  pregabalin 100 milliGRAM(s) Oral two times a day  senna 2 Tablet(s) Oral at bedtime    MEDICATIONS  (PRN):  acetaminophen   Tablet .. 650 milliGRAM(s) Oral every 4 hours PRN Temp greater or equal to 38.5C (101.3F), Mild Pain (1 - 3)  dextrose 40% Gel 15 Gram(s) Oral once PRN Blood Glucose LESS THAN 70 milliGRAM(s)/deciliter  glucagon  Injectable 1 milliGRAM(s) IntraMuscular once PRN Glucose LESS THAN 70 milligrams/deciliter  heparin  Injectable 17995 Unit(s) IV Push every 6 hours PRN For aPTT less than 40  heparin  Injectable 5000 Unit(s) IV Push every 6 hours PRN For aPTT between 40 - 57  oxyCODONE    IR 5 milliGRAM(s) Oral two times a day PRN Severe Pain (7 - 10)  sodium chloride 0.65% Nasal 1 Spray(s) Both Nostrils three times a day PRN Nasal Congestion    	    PHYSICAL EXAM:  T(C): 36.2 (11-18-19 @ 11:45), Max: 36.7 (11-18-19 @ 00:26)  HR: 106 (11-18-19 @ 11:45) (92 - 106)  BP: 118/53 (11-18-19 @ 11:45) (117/65 - 138/75)  RR: 18 (11-18-19 @ 11:45) (18 - 19)  SpO2: 96% (11-18-19 @ 11:45) (96% - 99%)  Wt(kg): --  I&O's Summary    17 Nov 2019 07:01  -  18 Nov 2019 07:00  --------------------------------------------------------  IN: 300 mL / OUT: 1400 mL / NET: -1100 mL    18 Nov 2019 07:01  -  18 Nov 2019 16:38  --------------------------------------------------------  IN: 0 mL / OUT: 1300 mL / NET: -1300 mL        Appearance: Normal	  HEENT:   Normal oral mucosa, PERRL, EOMI	  Lymphatic: No lymphadenopathy  Cardiovascular: Normal S1 S2, No JVD, No murmurs, No edema  Respiratory: Lungs clear to auscultation	  Psychiatry: A & O x 3, Mood & affect appropriate  Gastrointestinal:  Soft, Non-tender, + BS	  Skin: No rashes, No ecchymoses, No cyanosis	  Neurologic: Non-focal  Extremities: Normal range of motion, No clubbing, cyanosis or edema  Vascular: Peripheral pulses palpable 2+ bilaterally    TELEMETRY: 	    ECG:  	  RADIOLOGY:  OTHER: 	  	  LABS:	 	    CARDIAC MARKERS:                                14.0   8.80  )-----------( 280      ( 18 Nov 2019 02:15 )             45.9     11-18    136  |  91<L>  |  64<H>  ----------------------------<  272<H>  3.7   |  33<H>  |  1.68<H>    Ca    9.5      18 Nov 2019 11:31    TPro  7.4  /  Alb  2.9<L>  /  TBili  0.3  /  DBili  < 0.2  /  AST  18  /  ALT  8   /  AlkPhos  146<H>  11-17    proBNP:   Lipid Profile:   HgA1c:   TSH: CHIEF COMPLAINT:Patient is a 64y old  Male who presents with a chief complaint of   	        PAST MEDICAL & SURGICAL HISTORY:  Neuropathy  CAD (coronary artery disease)  MI (myocardial infarction): circa 2014  Atrial fibrillation  TIA (transient ischemic attack)  DM type 2 (diabetes mellitus, type 2)  HLD (hyperlipidemia)  HTN (hypertension), benign  S/P carotid endarterectomy: left  Status post angioplasty with stent: LULU x 3 2/7/2014          REVIEW OF SYSTEMS:  weak   EYES: No eye pain, visual disturbances, or discharge  NECK: No pain or stiffness  RESPIRATORY: breathing ok   CARDIOVASCULAR: No chest pain, palpitations, passing out, dizziness,  GASTROINTESTINAL: No abdominal or epigastric pain. No nausea, vomiting, or hematemesis; No diarrhea or constipation.  GENITOURINARY: No dysuria, frequency, hematuria, or incontinence  NEUROLOGICAL: No headaches,   Medications:  MEDICATIONS  (STANDING):  aspirin enteric coated 81 milliGRAM(s) Oral daily  buMETAnide 2 milliGRAM(s) Oral daily  dextrose 5%. 1000 milliLiter(s) (50 mL/Hr) IV Continuous <Continuous>  dextrose 50% Injectable 12.5 Gram(s) IV Push once  dextrose 50% Injectable 25 Gram(s) IV Push once  dextrose 50% Injectable 25 Gram(s) IV Push once  diltiazem    Tablet 30 milliGRAM(s) Oral three times a day  heparin  Infusion.  Unit(s)/Hr (24 mL/Hr) IV Continuous <Continuous>  insulin glargine Injectable (LANTUS) 35 Unit(s) SubCutaneous every morning  insulin glargine Injectable (LANTUS) 35 Unit(s) SubCutaneous at bedtime  insulin lispro (HumaLOG) corrective regimen sliding scale   SubCutaneous three times a day before meals  insulin lispro (HumaLOG) corrective regimen sliding scale   SubCutaneous at bedtime  insulin lispro Injectable (HumaLOG) 10 Unit(s) SubCutaneous before breakfast  insulin lispro Injectable (HumaLOG) 12 Unit(s) SubCutaneous before lunch  insulin lispro Injectable (HumaLOG) 16 Unit(s) SubCutaneous before dinner  metolazone 2.5 milliGRAM(s) Oral daily  metoprolol succinate  milliGRAM(s) Oral daily  pregabalin 100 milliGRAM(s) Oral two times a day  senna 2 Tablet(s) Oral at bedtime    MEDICATIONS  (PRN):  acetaminophen   Tablet .. 650 milliGRAM(s) Oral every 4 hours PRN Temp greater or equal to 38.5C (101.3F), Mild Pain (1 - 3)  dextrose 40% Gel 15 Gram(s) Oral once PRN Blood Glucose LESS THAN 70 milliGRAM(s)/deciliter  glucagon  Injectable 1 milliGRAM(s) IntraMuscular once PRN Glucose LESS THAN 70 milligrams/deciliter  heparin  Injectable 42371 Unit(s) IV Push every 6 hours PRN For aPTT less than 40  heparin  Injectable 5000 Unit(s) IV Push every 6 hours PRN For aPTT between 40 - 57  oxyCODONE    IR 5 milliGRAM(s) Oral two times a day PRN Severe Pain (7 - 10)  sodium chloride 0.65% Nasal 1 Spray(s) Both Nostrils three times a day PRN Nasal Congestion    	    PHYSICAL EXAM:  T(C): 36.2 (11-18-19 @ 11:45), Max: 36.7 (11-18-19 @ 00:26)  HR: 106 (11-18-19 @ 11:45) (92 - 106)  BP: 118/53 (11-18-19 @ 11:45) (117/65 - 138/75)  RR: 18 (11-18-19 @ 11:45) (18 - 19)  SpO2: 96% (11-18-19 @ 11:45) (96% - 99%)  Wt(kg): --  I&O's Summary    17 Nov 2019 07:01  -  18 Nov 2019 07:00  --------------------------------------------------------  IN: 300 mL / OUT: 1400 mL / NET: -1100 mL    18 Nov 2019 07:01  -  18 Nov 2019 16:38  --------------------------------------------------------  IN: 0 mL / OUT: 1300 mL / NET: -1300 mL        Appearance: Normal	  HEENT:   Normal oral mucosa, PERRL, EOMI	  Lymphatic: No lymphadenopathy  Cardiovascular: Normal S1 S2, No JVD,   Respiratory: dec bs   Psychiatry: A & O x 3, Mood & affect appropriate  Gastrointestinal:  Soft, Non-tender, + BS	obese  Skin: No rashes, No ecchymoses, No cyanosis	  Neurologic: Non-focal  Extremities: Normal range of motion, No clubbing, cyanosis   dec edema  pvd  Vascular: Peripheral pulses palpable     TELEMETRY: 	    ECG:  	  RADIOLOGY:  OTHER: 	  	  LABS:	 	    CARDIAC MARKERS:                                14.0   8.80  )-----------( 280      ( 18 Nov 2019 02:15 )             45.9     11-18    136  |  91<L>  |  64<H>  ----------------------------<  272<H>  3.7   |  33<H>  |  1.68<H>    Ca    9.5      18 Nov 2019 11:31    TPro  7.4  /  Alb  2.9<L>  /  TBili  0.3  /  DBili  < 0.2  /  AST  18  /  ALT  8   /  AlkPhos  146<H>  11-17    proBNP:   Lipid Profile:   HgA1c:   TSH:

## 2019-11-18 NOTE — PROGRESS NOTE ADULT - SUBJECTIVE AND OBJECTIVE BOX
Chief Complaint: DM2    History: Tolerating po. No hypoglycemia.    MEDICATIONS  (STANDING):  aspirin enteric coated 81 milliGRAM(s) Oral daily  buMETAnide 2 milliGRAM(s) Oral daily  dextrose 5%. 1000 milliLiter(s) (50 mL/Hr) IV Continuous <Continuous>  dextrose 50% Injectable 12.5 Gram(s) IV Push once  dextrose 50% Injectable 25 Gram(s) IV Push once  dextrose 50% Injectable 25 Gram(s) IV Push once  diltiazem    Tablet 30 milliGRAM(s) Oral three times a day  heparin  Infusion.  Unit(s)/Hr (24 mL/Hr) IV Continuous <Continuous>  insulin glargine Injectable (LANTUS) 35 Unit(s) SubCutaneous every morning  insulin glargine Injectable (LANTUS) 35 Unit(s) SubCutaneous at bedtime  insulin lispro (HumaLOG) corrective regimen sliding scale   SubCutaneous three times a day before meals  insulin lispro (HumaLOG) corrective regimen sliding scale   SubCutaneous at bedtime  insulin lispro Injectable (HumaLOG) 10 Unit(s) SubCutaneous before breakfast  insulin lispro Injectable (HumaLOG) 12 Unit(s) SubCutaneous before lunch  insulin lispro Injectable (HumaLOG) 16 Unit(s) SubCutaneous before dinner  metolazone 2.5 milliGRAM(s) Oral daily  metoprolol succinate  milliGRAM(s) Oral daily  pregabalin 100 milliGRAM(s) Oral two times a day  senna 2 Tablet(s) Oral at bedtime    MEDICATIONS  (PRN):  acetaminophen   Tablet .. 650 milliGRAM(s) Oral every 4 hours PRN Temp greater or equal to 38.5C (101.3F), Mild Pain (1 - 3)  dextrose 40% Gel 15 Gram(s) Oral once PRN Blood Glucose LESS THAN 70 milliGRAM(s)/deciliter  glucagon  Injectable 1 milliGRAM(s) IntraMuscular once PRN Glucose LESS THAN 70 milligrams/deciliter  heparin  Injectable 59653 Unit(s) IV Push every 6 hours PRN For aPTT less than 40  heparin  Injectable 5000 Unit(s) IV Push every 6 hours PRN For aPTT between 40 - 57  oxyCODONE    IR 5 milliGRAM(s) Oral two times a day PRN Severe Pain (7 - 10)  sodium chloride 0.65% Nasal 1 Spray(s) Both Nostrils three times a day PRN Nasal Congestion      Allergies    No Known Allergies    Intolerances      Review of Systems:    ALL OTHER SYSTEMS REVIEWED AND NEGATIVE        PHYSICAL EXAM:  VITALS: T(C): 36.6 (11-18-19 @ 17:13)  T(F): 97.9 (11-18-19 @ 17:13), Max: 98 (11-18-19 @ 00:26)  HR: 87 (11-18-19 @ 17:13) (87 - 106)  BP: 114/66 (11-18-19 @ 17:13) (114/66 - 138/75)  RR:  (18 - 19)  SpO2:  (96% - 99%)  Wt(kg): --  GENERAL: NAD, well-groomed, well-developed  EYES: No proptosis, no lid lag, anicteric  HEENT:  Atraumatic, Normocephalic, moist mucous membranes      CAPILLARY BLOOD GLUCOSE      POCT Blood Glucose.: 154 mg/dL (18 Nov 2019 17:37)  POCT Blood Glucose.: 252 mg/dL (18 Nov 2019 12:26)  POCT Blood Glucose.: 139 mg/dL (18 Nov 2019 08:40)  POCT Blood Glucose.: 232 mg/dL (17 Nov 2019 21:29)      11-18    136  |  91<L>  |  64<H>  ----------------------------<  272<H>  3.7   |  33<H>  |  1.68<H>    EGFR if : 49  EGFR if non : 43    Ca    9.5      11-18    TPro  7.4  /  Alb  2.9<L>  /  TBili  0.3  /  DBili  < 0.2  /  AST  18  /  ALT  8   /  AlkPhos  146<H>  11-17          Thyroid Function Tests:      Hemoglobin A1C, Whole Blood: 11.7 % <H> [4.0 - 5.6] (11-07-19 @ 09:14)  Hemoglobin A1C, Whole Blood: 10.3 % <H> [4.0 - 5.6] (10-17-19 @ 07:43)

## 2019-11-18 NOTE — CONSULT NOTE ADULT - SUBJECTIVE AND OBJECTIVE BOX
CARDIOLOGY CONSULT - Dr. Mazariegos         HPI:  64 year old male with Hypertension, Hyperlipidemia, Atrial Fibrillation, s/p DCCV, Coronary Artery Disease, NSTEMI, (2/2014), s/p PCI to mid LAD, Proximal LCx, Distal LCx (2/14, [facility], LULU), Congestive Heart Failure, Normal Ejection Fraction, Diabetes Mellitus, CVA, Severe Left Internal Carotid Disease, s/p CEA (2/2014)  transfer to [facility] for Right VATs with decortication. Patient is known to the office. He initially presented to [facility] with worsening SOB, cough found to have right sided pleural effusion. Since readmission: 11/5 CT increase from 10/16 CT in partially loculated moderate right pleural effusion w/ compressive atelectasis w/o pneumothorax. 11/8 thoracentisis by IR w/ 490cc blood tinged fluid found to be exudative effusion;  cytology was negative for maligant cells. 11/8 CT s/p IR thoracentesis w/ decreased pleural effusion w/o pneumothorax. 11/12 pigtail catheter placed by IR w/ 1L serosang/bloody fluid drained. 11/12 CXR w/ resolution of right pleural effusion. 11/13 small right hydropneumothorax.  thoracic surgery was consulted, plans for Right VATs with decortication. Patient with recent TTE with no evidence of pericardial effusion, mod LAE, grossly borderline LV sys dysfx EF 45% likely underestimated due to AF.  During hospital course, patient was acute gout flare, rheumatology consulted, multiple one time doses of colchicine given, patient refusing doses of steroids due to uncontrolled blood sugars.   on exam. denies cp, increase sob, palps, or increase LE edema. ROS otherwise negative.           PAST MEDICAL & SURGICAL HISTORY:  Neuropathy  CAD (coronary artery disease)  MI (myocardial infarction): circa 2014  Atrial fibrillation  TIA (transient ischemic attack)  DM type 2 (diabetes mellitus, type 2)  HLD (hyperlipidemia)  HTN (hypertension), benign  S/P carotid endarterectomy: left  Status post angioplasty with stent: LULU x 3 2/7/2014          PREVIOUS DIAGNOSTIC TESTING:    [ ] Echocardiogram:  [ ]  Catheterization:  [ ] Stress Test:  	    MEDICATIONS:  Home Medications:  acetaminophen 325 mg oral tablet: 2 tab(s) orally every 4 hours, As needed, Moderate Pain (4 - 6) (17 Nov 2019 11:05)  aspirin 81 mg oral delayed release tablet: 1 tab(s) orally once a day (17 Nov 2019 11:05)  bumetanide 2 mg oral tablet: 1 tab(s) orally once a day (17 Nov 2019 11:05)  Colcrys 0.6 mg oral tablet: 1 tab(s) orally once a day for 2 more days to stop 11/18/2019 (17 Nov 2019 11:05)  dilTIAZem 30 mg oral tablet: 1 tab(s) orally 3 times a day (17 Nov 2019 11:05)  enoxaparin 150 mg/mL injectable solution: 130 milligram(s) injectable 2 times a day (17 Nov 2019 11:05)  insulin glargine 100 units/mL subcutaneous solution: 35 unit(s) subcutaneous once a day (at bedtime) (17 Nov 2019 11:05)  insulin glargine 100 units/mL subcutaneous solution: 35 unit(s) subcutaneous once a day in AM  (17 Nov 2019 11:05)  insulin lispro 100 units/mL injectable solution: injectable 3 times a day (before meals)  2 Unit(s) if Glucose 151 - 200  4 Unit(s) if Glucose 201 - 250  6 Unit(s) if Glucose 251 - 300  8 Unit(s) if Glucose 301 - 350  10 Unit(s) if Glucose 351 - 400  12 Unit(s) if Glucose Greater Than 400 (17 Nov 2019 11:05)  insulin lispro 100 units/mL injectable solution: injectable once a day (at bedtime)  2 Unit(s) if Glucose 251 - 300  4 Unit(s) if Glucose 301 - 350  6 Unit(s) if Glucose 351 - 400  8 Unit(s) if Glucose GRAETER THAN 400 (17 Nov 2019 11:05)  insulin lispro 100 units/mL injectable solution: 22 unit(s) injectable once a day before dinner  (17 Nov 2019 11:05)  insulin lispro 100 units/mL injectable solution: 12 unit(s) injectable once a day before lunch  (17 Nov 2019 11:05)  insulin lispro 100 units/mL injectable solution: 20 unit(s) injectable once a day before breakfast  (17 Nov 2019 11:05)  metOLazone 2.5 mg oral tablet: 1 tab(s) orally once a day (17 Nov 2019 11:05)  metoprolol succinate 100 mg oral tablet, extended release: 1 tab(s) orally once a day (17 Nov 2019 11:05)  oxyCODONE 5 mg oral tablet: 1 tab(s) orally 2 times a day, As needed, Severe Pain (7 - 10) (17 Nov 2019 11:05)  pregabalin 100 mg oral capsule: 1 cap(s) orally 2 times a day (17 Nov 2019 11:05)  senna oral tablet: 2 tab(s) orally once a day (at bedtime) (17 Nov 2019 11:05)  sodium chloride 0.65% nasal spray: 1 spray(s) nasal 3 times a day (17 Nov 2019 11:05)      MEDICATIONS  (STANDING):  aspirin enteric coated 81 milliGRAM(s) Oral daily  buMETAnide 2 milliGRAM(s) Oral daily  dextrose 5%. 1000 milliLiter(s) (50 mL/Hr) IV Continuous <Continuous>  dextrose 50% Injectable 12.5 Gram(s) IV Push once  dextrose 50% Injectable 25 Gram(s) IV Push once  dextrose 50% Injectable 25 Gram(s) IV Push once  diltiazem    Tablet 30 milliGRAM(s) Oral three times a day  heparin  Infusion.  Unit(s)/Hr (24 mL/Hr) IV Continuous <Continuous>  insulin glargine Injectable (LANTUS) 35 Unit(s) SubCutaneous every morning  insulin glargine Injectable (LANTUS) 35 Unit(s) SubCutaneous at bedtime  insulin lispro (HumaLOG) corrective regimen sliding scale   SubCutaneous three times a day before meals  insulin lispro (HumaLOG) corrective regimen sliding scale   SubCutaneous at bedtime  insulin lispro Injectable (HumaLOG) 10 Unit(s) SubCutaneous before breakfast  insulin lispro Injectable (HumaLOG) 12 Unit(s) SubCutaneous before lunch  insulin lispro Injectable (HumaLOG) 16 Unit(s) SubCutaneous before dinner  metolazone 2.5 milliGRAM(s) Oral daily  metoprolol succinate  milliGRAM(s) Oral daily  pregabalin 100 milliGRAM(s) Oral two times a day  senna 2 Tablet(s) Oral at bedtime      FAMILY HISTORY:  Family history of heart disease      SOCIAL HISTORY:    [x ] Non-smoker  [ ] Smoker  [ ] Alcohol    Allergies    No Known Allergies    Intolerances    	    REVIEW OF SYSTEMS:  CONSTITUTIONAL: No fever, weight loss, or fatigue  EYES: No eye pain, visual disturbances, or discharge  ENMT:  No difficulty hearing, tinnitus, vertigo; No sinus or throat pain  NECK: No pain or stiffness  RESPIRATORY: No cough, wheezing, chills or hemoptysis; No Shortness of Breath  CARDIOVASCULAR: No chest pain, palpitations, passing out, dizziness, or leg swelling  GASTROINTESTINAL: No abdominal or epigastric pain. No nausea, vomiting, or hematemesis; No diarrhea or constipation. No melena or hematochezia.  GENITOURINARY: No dysuria, frequency, hematuria, or incontinence  NEUROLOGICAL: No headaches, memory loss, loss of strength, numbness, or tremors  SKIN: No itching, burning, rashes, or lesions   	    [x] All others negative	  [ ] Unable to obtain    PHYSICAL EXAM:  T(C): 36.2 (11-18-19 @ 11:45), Max: 36.7 (11-18-19 @ 00:26)  HR: 106 (11-18-19 @ 11:45) (81 - 106)  BP: 118/53 (11-18-19 @ 11:45) (117/65 - 138/75)  RR: 18 (11-18-19 @ 11:45) (18 - 19)  SpO2: 96% (11-18-19 @ 11:45) (94% - 99%)  Wt(kg): --  I&O's Summary    17 Nov 2019 07:01  -  18 Nov 2019 07:00  --------------------------------------------------------  IN: 300 mL / OUT: 1400 mL / NET: -1100 mL    18 Nov 2019 07:01  -  18 Nov 2019 12:20  --------------------------------------------------------  IN: 0 mL / OUT: 1300 mL / NET: -1300 mL        Appearance: Normal	  Psychiatry: A & O x 3, Mood & affect appropriate  HEENT:   Normal oral mucosa, PERRL, EOMI	  Lymphatic: No lymphadenopathy  Cardiovascular: Normal S1 S2, irregular   Respiratory: diminished at base   Gastrointestinal:  Soft, Non-tender, + BS	  Skin: No rashes, No ecchymoses, No cyanosis	  Neurologic: Non-focal  Extremities: Normal range of motion, =1 bl LE edema  Vascular: Peripheral pulses palpable 2+ bilaterally    TELEMETRY: afib -130s 	    ECG:  	  RADIOLOGY:  OTHER: 	  	  LABS:	 	    CARDIAC MARKERS:                                  14.0   8.80  )-----------( 280      ( 18 Nov 2019 02:15 )             45.9     11-18    136  |  91<L>  |  64<H>  ----------------------------<  272<H>  3.7   |  33<H>  |  1.68<H>    Ca    9.5      18 Nov 2019 11:31    TPro  7.4  /  Alb  2.9<L>  /  TBili  0.3  /  DBili  < 0.2  /  AST  18  /  ALT  8   /  AlkPhos  146<H>  11-17    PT/INR - ( 17 Nov 2019 18:25 )   PT: 12.1 SEC;   INR: 1.09          PTT - ( 18 Nov 2019 02:15 )  PTT:47.6 SEC  proBNP:   Lipid Profile:   HgA1c:   TSH:

## 2019-11-18 NOTE — CONSULT NOTE ADULT - ATTENDING COMMENTS
agree with the above assessment and plan by JOYCE Tompkins.  CV stable  transferred to [facility] for decortication  continue diuretics  heparin gtt  pt optimized for OR can proceed w moderate cv risk

## 2019-11-19 LAB
ANION GAP SERPL CALC-SCNC: 13 MMO/L — SIGNIFICANT CHANGE UP (ref 7–14)
APTT BLD: 60.3 SEC — HIGH (ref 27.5–36.3)
BUN SERPL-MCNC: 59 MG/DL — HIGH (ref 7–23)
CALCIUM SERPL-MCNC: 9.1 MG/DL — SIGNIFICANT CHANGE UP (ref 8.4–10.5)
CHLORIDE SERPL-SCNC: 93 MMOL/L — LOW (ref 98–107)
CO2 SERPL-SCNC: 32 MMOL/L — HIGH (ref 22–31)
CREAT SERPL-MCNC: 1.74 MG/DL — HIGH (ref 0.5–1.3)
GLUCOSE BLDC GLUCOMTR-MCNC: 104 MG/DL — HIGH (ref 70–99)
GLUCOSE BLDC GLUCOMTR-MCNC: 110 MG/DL — HIGH (ref 70–99)
GLUCOSE BLDC GLUCOMTR-MCNC: 157 MG/DL — HIGH (ref 70–99)
GLUCOSE BLDC GLUCOMTR-MCNC: 226 MG/DL — HIGH (ref 70–99)
GLUCOSE SERPL-MCNC: 139 MG/DL — HIGH (ref 70–99)
HCT VFR BLD CALC: 47.2 % — SIGNIFICANT CHANGE UP (ref 39–50)
HGB BLD-MCNC: 14.6 G/DL — SIGNIFICANT CHANGE UP (ref 13–17)
MCHC RBC-ENTMCNC: 25.6 PG — LOW (ref 27–34)
MCHC RBC-ENTMCNC: 30.9 % — LOW (ref 32–36)
MCV RBC AUTO: 82.7 FL — SIGNIFICANT CHANGE UP (ref 80–100)
NRBC # FLD: 0 K/UL — SIGNIFICANT CHANGE UP (ref 0–0)
PLATELET # BLD AUTO: 294 K/UL — SIGNIFICANT CHANGE UP (ref 150–400)
PMV BLD: 10.5 FL — SIGNIFICANT CHANGE UP (ref 7–13)
POTASSIUM SERPL-MCNC: 3.6 MMOL/L — SIGNIFICANT CHANGE UP (ref 3.5–5.3)
POTASSIUM SERPL-SCNC: 3.6 MMOL/L — SIGNIFICANT CHANGE UP (ref 3.5–5.3)
RBC # BLD: 5.71 M/UL — SIGNIFICANT CHANGE UP (ref 4.2–5.8)
RBC # FLD: 15.5 % — HIGH (ref 10.3–14.5)
SODIUM SERPL-SCNC: 138 MMOL/L — SIGNIFICANT CHANGE UP (ref 135–145)
WBC # BLD: 9.93 K/UL — SIGNIFICANT CHANGE UP (ref 3.8–10.5)
WBC # FLD AUTO: 9.93 K/UL — SIGNIFICANT CHANGE UP (ref 3.8–10.5)

## 2019-11-19 PROCEDURE — 99232 SBSQ HOSP IP/OBS MODERATE 35: CPT

## 2019-11-19 PROCEDURE — 71250 CT THORAX DX C-: CPT | Mod: 26

## 2019-11-19 RX ADMIN — Medication 100 MILLIGRAM(S): at 21:56

## 2019-11-19 RX ADMIN — BUMETANIDE 2 MILLIGRAM(S): 0.25 INJECTION INTRAMUSCULAR; INTRAVENOUS at 05:48

## 2019-11-19 RX ADMIN — INSULIN GLARGINE 35 UNIT(S): 100 INJECTION, SOLUTION SUBCUTANEOUS at 21:56

## 2019-11-19 RX ADMIN — Medication 100 MILLIGRAM(S): at 18:11

## 2019-11-19 RX ADMIN — Medication 30 MILLIGRAM(S): at 12:43

## 2019-11-19 RX ADMIN — NYSTATIN CREAM 1 APPLICATION(S): 100000 CREAM TOPICAL at 18:11

## 2019-11-19 RX ADMIN — INSULIN GLARGINE 35 UNIT(S): 100 INJECTION, SOLUTION SUBCUTANEOUS at 09:54

## 2019-11-19 RX ADMIN — Medication 30 MILLIGRAM(S): at 05:48

## 2019-11-19 RX ADMIN — Medication 15 UNIT(S): at 09:09

## 2019-11-19 RX ADMIN — Medication 1: at 18:10

## 2019-11-19 RX ADMIN — Medication 16 UNIT(S): at 18:10

## 2019-11-19 RX ADMIN — HEPARIN SODIUM 2300 UNIT(S)/HR: 5000 INJECTION INTRAVENOUS; SUBCUTANEOUS at 04:03

## 2019-11-19 RX ADMIN — OXYCODONE HYDROCHLORIDE 5 MILLIGRAM(S): 5 TABLET ORAL at 13:30

## 2019-11-19 RX ADMIN — Medication 12 UNIT(S): at 12:41

## 2019-11-19 RX ADMIN — Medication 100 MILLIGRAM(S): at 05:48

## 2019-11-19 RX ADMIN — SENNA PLUS 2 TABLET(S): 8.6 TABLET ORAL at 21:56

## 2019-11-19 RX ADMIN — OXYCODONE HYDROCHLORIDE 5 MILLIGRAM(S): 5 TABLET ORAL at 12:48

## 2019-11-19 RX ADMIN — Medication 30 MILLIGRAM(S): at 21:56

## 2019-11-19 RX ADMIN — NYSTATIN CREAM 1 APPLICATION(S): 100000 CREAM TOPICAL at 05:50

## 2019-11-19 RX ADMIN — Medication 2: at 12:41

## 2019-11-19 RX ADMIN — Medication 81 MILLIGRAM(S): at 12:41

## 2019-11-19 NOTE — PROGRESS NOTE ADULT - SUBJECTIVE AND OBJECTIVE BOX
Subjective: 63 y/o male presents sitting up in bed in NAD. Pt continues to be PAF on heparin gtt at this time. Chest tube removed.  He currently denies sob, cp, nvd.       Vital Signs:  Vital Signs Last 24 Hrs  T(C): 36.3 (11-19-19 @ 05:27), Max: 36.6 (11-18-19 @ 17:13)  T(F): 97.4 (11-19-19 @ 05:27), Max: 97.9 (11-18-19 @ 17:13)  HR: 81 (11-19-19 @ 05:27) (81 - 106)  BP: 148/75 (11-19-19 @ 05:27) (114/66 - 148/75)  RR: 18 (11-19-19 @ 05:27) (18 - 18)  SpO2: 100% (11-19-19 @ 05:27) (96% - 100%) on (O2)    Pertinent Physical Exam:  Telemetry/Alarms: afib  General: WN/WD NAD  Neurology: Awake, nonfocal, ROA x 4  Eyes: Scleras clear, PERRLA/ EOMI, Gross vision intact  ENT:Gross hearing intact, grossly patent pharynx, no stridor  Neck: Neck supple, trachea midline, No JVD,   Respiratory: CTA B/L, No wheezing, rales, rhonchi  CV: RRR, S1S2, no murmurs, rubs or gallops  Abdominal: Soft, NT, ND +BS,   Extremities: No edema, + peripheral pulses  Skin: No Rashes, Hematoma, Ecchymosis  Lymphatic: No Neck, axilla, groin LAD  Psych: Oriented x 3, normal affect  Incisions: c/d/i          I&O's Summary    18 Nov 2019 07:01  -  19 Nov 2019 07:00  --------------------------------------------------------  IN: 481 mL / OUT: 2100 mL / NET: -1619 mL        Relevant labs, radiology and Medications reviewed    CXR: < from: Xray Chest 1 View- PORTABLE-Urgent (11.15.19 @ 17:47) >    Impression: No pneumothorax seen status post removal of right basilar   chest tube. Unchanged bilateral small pleural effusions with associated   atelectasis.          < end of copied text >                           14.6   9.93  )-----------( 294      ( 19 Nov 2019 02:50 )             47.2     11-19    138  |  93<L>  |  59<H>  ----------------------------<  139<H>  3.6   |  32<H>  |  1.74<H>    Ca    9.1      19 Nov 2019 02:50    TPro  7.4  /  Alb  2.9<L>  /  TBili  0.3  /  DBili  < 0.2  /  AST  18  /  ALT  8   /  AlkPhos  146<H>  11-17    PT/INR - ( 17 Nov 2019 18:25 )   PT: 12.1 SEC;   INR: 1.09          PTT - ( 19 Nov 2019 02:50 )  PTT:60.3 SEC  MEDICATIONS  (STANDING):  aspirin enteric coated 81 milliGRAM(s) Oral daily  buMETAnide 2 milliGRAM(s) Oral daily  dextrose 5%. 1000 milliLiter(s) (50 mL/Hr) IV Continuous <Continuous>  dextrose 50% Injectable 12.5 Gram(s) IV Push once  dextrose 50% Injectable 25 Gram(s) IV Push once  dextrose 50% Injectable 25 Gram(s) IV Push once  diltiazem    Tablet 30 milliGRAM(s) Oral three times a day  heparin  Infusion.  Unit(s)/Hr (24 mL/Hr) IV Continuous <Continuous>  insulin glargine Injectable (LANTUS) 35 Unit(s) SubCutaneous every morning  insulin glargine Injectable (LANTUS) 35 Unit(s) SubCutaneous at bedtime  insulin lispro (HumaLOG) corrective regimen sliding scale   SubCutaneous three times a day before meals  insulin lispro (HumaLOG) corrective regimen sliding scale   SubCutaneous at bedtime  insulin lispro Injectable (HumaLOG) 12 Unit(s) SubCutaneous before lunch  insulin lispro Injectable (HumaLOG) 16 Unit(s) SubCutaneous before dinner  insulin lispro Injectable (HumaLOG) 15 Unit(s) SubCutaneous before breakfast  metolazone 2.5 milliGRAM(s) Oral daily  metoprolol succinate  milliGRAM(s) Oral daily  nystatin Powder 1 Application(s) Topical every 12 hours  pregabalin 100 milliGRAM(s) Oral two times a day  senna 2 Tablet(s) Oral at bedtime    MEDICATIONS  (PRN):  acetaminophen   Tablet .. 650 milliGRAM(s) Oral every 4 hours PRN Temp greater or equal to 38.5C (101.3F), Mild Pain (1 - 3)  dextrose 40% Gel 15 Gram(s) Oral once PRN Blood Glucose LESS THAN 70 milliGRAM(s)/deciliter  glucagon  Injectable 1 milliGRAM(s) IntraMuscular once PRN Glucose LESS THAN 70 milligrams/deciliter  heparin  Injectable 66364 Unit(s) IV Push every 6 hours PRN For aPTT less than 40  heparin  Injectable 5000 Unit(s) IV Push every 6 hours PRN For aPTT between 40 - 57  oxyCODONE    IR 5 milliGRAM(s) Oral two times a day PRN Severe Pain (7 - 10)  sodium chloride 0.65% Nasal 1 Spray(s) Both Nostrils three times a day PRN Nasal Congestion      Assessment    64 year old male with Hypertension, Hyperlipidemia, Atrial Fibrillation, s/p DCCV, Coronary Artery Disease, NSTEMI, (2/2014), s/p PCI to mid LAD, Proximal LCx, Distal LCx (2/14, [facility], LULU), Congestive Heart Failure, Normal Ejection Fraction, Diabetes Mellitus, CVA, Severe Left Internal Carotid Disease, s/p CEA (2/2014) presenting with worsening SOB, cough found to have recurrent right sided pleural effusion and hydropneumothorax  Pt has thoracentesis 11/8  and Pigtail catheter placed that was removed 11/5   Thoracic surgery was consulted, plans for Right VATs with decortication.  Patient with recent TTE with no evidence of pericardial effusion, mod LAE, grossly borderline LV sys dysfx EF 45% likely underestimated due to AF.   During hospital course, patient was acute gout flare, rheumatology consulted, multiple one time doses of colchicine given, patient refusing doses of steroids due to uncontrolled blood sugars.    Endocrine consulted for uncontrolled blood sugars, insulin doses were adjusted accordingly.  Patient stable for transfer to [facility] for Right VATs with decortication.   Patient transferred to [facility] on 11/17/19 11/19 Chest tube removed yesterday. CT chest today        PLAN  Neuro: Pain managementwit PO pain med as needed  Pulm: Encourage coughing, deep breathing and use of incentive spirometry. Daily CXR.   Cardio: Monitor telemetry/alarms. Echo with EF 45%. Afib on toprol. s/p MVR on heparin gtt, f/u PTT adjust as needed. Cardiology following; cleared for surgery moderate cv risk.   GI: Tolerating diet. Continue stool softeners.  Renal: monitor urine output, supplement electrolytes as needed. CKD at baseline, urinating.   Vasc: Heparin SC/SCDs for DVT prophylaxis  Heme: Stable H/H. Hep gtt for MVR  ID: Off antibiotics. Stable. No white count, no fevers recoreded  Therapy: OOB/ambulate  Disposition: CT chest non contrast today to evaluate for surgery   Discussed with Cardiothoracic Team at AM rounds.

## 2019-11-19 NOTE — PROGRESS NOTE ADULT - ATTENDING COMMENTS
agree with the above assessment and plan by JOYCE Tompkins.  CV stable  transferred to [facility] for decortication  continue diuretics  heparin gtt  pt optimized for OR can proceed w moderate cv risk

## 2019-11-19 NOTE — PROGRESS NOTE ADULT - SUBJECTIVE AND OBJECTIVE BOX
Chief Complaint: DM 2    History: Patient seen at bedside. Reports he is eating meals, had tuna salad and rice for lunch. Denies n/v, denies s/s of hypoglycemia. BG this AM stable pre-breakfast at 104 mg/dl, patient endorses eating nuts prior to lunch fingerstick assessment 226 mg/dl.     MEDICATIONS  (STANDING):  aspirin enteric coated 81 milliGRAM(s) Oral daily  buMETAnide 2 milliGRAM(s) Oral daily  dextrose 5%. 1000 milliLiter(s) (50 mL/Hr) IV Continuous <Continuous>  dextrose 50% Injectable 12.5 Gram(s) IV Push once  dextrose 50% Injectable 25 Gram(s) IV Push once  dextrose 50% Injectable 25 Gram(s) IV Push once  diltiazem    Tablet 30 milliGRAM(s) Oral three times a day  heparin  Infusion.  Unit(s)/Hr (24 mL/Hr) IV Continuous <Continuous>  insulin glargine Injectable (LANTUS) 35 Unit(s) SubCutaneous every morning  insulin glargine Injectable (LANTUS) 35 Unit(s) SubCutaneous at bedtime  insulin lispro (HumaLOG) corrective regimen sliding scale   SubCutaneous three times a day before meals  insulin lispro (HumaLOG) corrective regimen sliding scale   SubCutaneous at bedtime  insulin lispro Injectable (HumaLOG) 12 Unit(s) SubCutaneous before lunch  insulin lispro Injectable (HumaLOG) 16 Unit(s) SubCutaneous before dinner  insulin lispro Injectable (HumaLOG) 15 Unit(s) SubCutaneous before breakfast  metolazone 2.5 milliGRAM(s) Oral daily  metoprolol succinate  milliGRAM(s) Oral daily  nystatin Powder 1 Application(s) Topical every 12 hours  pregabalin 100 milliGRAM(s) Oral two times a day  senna 2 Tablet(s) Oral at bedtime    MEDICATIONS  (PRN):  acetaminophen   Tablet .. 650 milliGRAM(s) Oral every 4 hours PRN Temp greater or equal to 38.5C (101.3F), Mild Pain (1 - 3)  dextrose 40% Gel 15 Gram(s) Oral once PRN Blood Glucose LESS THAN 70 milliGRAM(s)/deciliter  glucagon  Injectable 1 milliGRAM(s) IntraMuscular once PRN Glucose LESS THAN 70 milligrams/deciliter  heparin  Injectable 59349 Unit(s) IV Push every 6 hours PRN For aPTT less than 40  heparin  Injectable 5000 Unit(s) IV Push every 6 hours PRN For aPTT between 40 - 57  oxyCODONE    IR 5 milliGRAM(s) Oral two times a day PRN Severe Pain (7 - 10)  sodium chloride 0.65% Nasal 1 Spray(s) Both Nostrils three times a day PRN Nasal Congestion    No Known Allergies    Review of Systems:  HEENT: No pain  Cardiovascular: No chest pain  Respiratory: No SOB  GI: No nausea, vomiting    PHYSICAL EXAM:  VITALS: T(C): 36.5 (11-19-19 @ 16:21)  T(F): 97.7 (11-19-19 @ 16:21), Max: 98 (11-19-19 @ 12:37)  HR: 110 (11-19-19 @ 12:37) (81 - 110)  BP: 141/68 (11-19-19 @ 16:21) (114/66 - 148/75)  RR:  (18 - 18)  SpO2:  (96% - 100%)  Wt(kg): --  GENERAL: NAD  EYES: No proptosis, anicteric  HEENT:  Atraumatic, Normocephalic, moist mucous membranes  RESPIRATORY: unlabored respirations   PSYCH: Alert and oriented x 3, normal affect, normal mood    CAPILLARY BLOOD GLUCOSE    POCT Blood Glucose.: 226 mg/dL (19 Nov 2019 12:33)  POCT Blood Glucose.: 104 mg/dL (19 Nov 2019 09:03)  POCT Blood Glucose.: 188 mg/dL (18 Nov 2019 22:06)  POCT Blood Glucose.: 154 mg/dL (18 Nov 2019 17:37)      11-19    138  |  93<L>  |  59<H>  ----------------------------<  139<H>  3.6   |  32<H>  |  1.74<H>    EGFR if : 47  EGFR if non : 41    Ca    9.1      11-19    TPro  7.4  /  Alb  2.9<L>  /  TBili  0.3  /  DBili  < 0.2  /  AST  18  /  ALT  8   /  AlkPhos  146<H>  11-17      Hemoglobin A1C, Whole Blood: 11.7 % <H> [4.0 - 5.6] (11-07-19 @ 09:14)  Hemoglobin A1C, Whole Blood: 10.3 % <H> [4.0 - 5.6] (10-17-19 @ 07:43)

## 2019-11-19 NOTE — PROGRESS NOTE ADULT - PROBLEM SELECTOR PLAN 1
-BG target 100-180 mg/dl  -Please continue to check BG before meals and bedtime   -Continue Lantus 35 units SQ BID  -Continue Humalog to 15 units before breakfast; Humalog 12 units pre-lunch and Humalog 16 units pre-dinner.   -Will monitor on these above doses for another day; as patient pre-lunch fingerstick was not a fasting level today  -Continue with Humalog low dose correctional scale before meals and low dose scale at bedtime   -Consistent carbohydrate diet    Discharge Plan:  -Plan for discharge to resume basal/bolus insulin pens. Dose TBD  -Patient should see opthalmology and PCP after discharge and yearly  -Endocrine follow up with Dr. Real, Friday January 3, 2020 at 2:15 PM, [address], [address], [facility] [address]

## 2019-11-19 NOTE — PROGRESS NOTE ADULT - SUBJECTIVE AND OBJECTIVE BOX
CARDIOLOGY FOLLOW UP - Dr. Mazariegos    CC no cp or sob       PHYSICAL EXAM:  T(C): 36.3 (11-19-19 @ 05:27), Max: 36.6 (11-18-19 @ 17:13)  HR: 81 (11-19-19 @ 05:27) (81 - 106)  BP: 148/75 (11-19-19 @ 05:27) (114/66 - 148/75)  RR: 18 (11-19-19 @ 05:27) (18 - 18)  SpO2: 100% (11-19-19 @ 05:27) (96% - 100%)  Wt(kg): --  I&O's Summary    18 Nov 2019 07:01  -  19 Nov 2019 07:00  --------------------------------------------------------  IN: 481 mL / OUT: 2100 mL / NET: -1619 mL        Appearance: Normal	  Cardiovascular: Normal S1 S2, irregular   Respiratory: diminished    Gastrointestinal:  Soft, Non-tender, + BS	  Extremities: Normal range of motion, bl trace + 1 edema        MEDICATIONS  (STANDING):  aspirin enteric coated 81 milliGRAM(s) Oral daily  buMETAnide 2 milliGRAM(s) Oral daily  dextrose 5%. 1000 milliLiter(s) (50 mL/Hr) IV Continuous <Continuous>  dextrose 50% Injectable 12.5 Gram(s) IV Push once  dextrose 50% Injectable 25 Gram(s) IV Push once  dextrose 50% Injectable 25 Gram(s) IV Push once  diltiazem    Tablet 30 milliGRAM(s) Oral three times a day  heparin  Infusion.  Unit(s)/Hr (24 mL/Hr) IV Continuous <Continuous>  insulin glargine Injectable (LANTUS) 35 Unit(s) SubCutaneous every morning  insulin glargine Injectable (LANTUS) 35 Unit(s) SubCutaneous at bedtime  insulin lispro (HumaLOG) corrective regimen sliding scale   SubCutaneous three times a day before meals  insulin lispro (HumaLOG) corrective regimen sliding scale   SubCutaneous at bedtime  insulin lispro Injectable (HumaLOG) 12 Unit(s) SubCutaneous before lunch  insulin lispro Injectable (HumaLOG) 16 Unit(s) SubCutaneous before dinner  insulin lispro Injectable (HumaLOG) 15 Unit(s) SubCutaneous before breakfast  metolazone 2.5 milliGRAM(s) Oral daily  metoprolol succinate  milliGRAM(s) Oral daily  nystatin Powder 1 Application(s) Topical every 12 hours  pregabalin 100 milliGRAM(s) Oral two times a day  senna 2 Tablet(s) Oral at bedtime      TELEMETRY: afib hr 90 -120	    ECG:  	  RADIOLOGY:   DIAGNOSTIC TESTING:  [ ] Echocardiogram:  [ ]  Catheterization:  [ ] Stress Test:    OTHER: 	    LABS:	 	                            14.6   9.93  )-----------( 294      ( 19 Nov 2019 02:50 )             47.2     11-19    138  |  93<L>  |  59<H>  ----------------------------<  139<H>  3.6   |  32<H>  |  1.74<H>    Ca    9.1      19 Nov 2019 02:50    TPro  7.4  /  Alb  2.9<L>  /  TBili  0.3  /  DBili  < 0.2  /  AST  18  /  ALT  8   /  AlkPhos  146<H>  11-17    PT/INR - ( 17 Nov 2019 18:25 )   PT: 12.1 SEC;   INR: 1.09          PTT - ( 19 Nov 2019 02:50 )  PTT:60.3 SEC

## 2019-11-19 NOTE — PROGRESS NOTE ADULT - SUBJECTIVE AND OBJECTIVE BOX
CHIEF COMPLAINT:Patient is a 64y old  Male who presents with a chief complaint of   	        PAST MEDICAL & SURGICAL HISTORY:  Neuropathy  CAD (coronary artery disease)  MI (myocardial infarction): circa 2014  Atrial fibrillation  TIA (transient ischemic attack)  DM type 2 (diabetes mellitus, type 2)  HLD (hyperlipidemia)  HTN (hypertension), benign  S/P carotid endarterectomy: left  Status post angioplasty with stent: LULU x 3 2/7/2014          REVIEW OF SYSTEMS:  CONSTITUTIONAL: No fever, weight loss, or fatigue  EYES: No eye pain, visual disturbances, or discharge  NECK: No pain or stiffness  RESPIRATORY: breathing stable   CARDIOVASCULAR: No chest pain, palpitations, passing out, dizziness, or leg swelling  GASTROINTESTINAL: No abdominal or epigastric pain. No nausea, vomiting, or hematemesis; No diarrhea or constipation. No melena or hematochezia.  GENITOURINARY: No dysuria, frequency, hematuria, or incontinence  NEUROLOGICAL: No headaches, memory loss, loss of strength, numbness, or tremors  S    Medications:  MEDICATIONS  (STANDING):  aspirin enteric coated 81 milliGRAM(s) Oral daily  buMETAnide 2 milliGRAM(s) Oral daily  dextrose 5%. 1000 milliLiter(s) (50 mL/Hr) IV Continuous <Continuous>  dextrose 50% Injectable 12.5 Gram(s) IV Push once  dextrose 50% Injectable 25 Gram(s) IV Push once  dextrose 50% Injectable 25 Gram(s) IV Push once  diltiazem    Tablet 30 milliGRAM(s) Oral three times a day  heparin  Infusion.  Unit(s)/Hr (24 mL/Hr) IV Continuous <Continuous>  insulin glargine Injectable (LANTUS) 35 Unit(s) SubCutaneous every morning  insulin glargine Injectable (LANTUS) 35 Unit(s) SubCutaneous at bedtime  insulin lispro (HumaLOG) corrective regimen sliding scale   SubCutaneous three times a day before meals  insulin lispro (HumaLOG) corrective regimen sliding scale   SubCutaneous at bedtime  insulin lispro Injectable (HumaLOG) 12 Unit(s) SubCutaneous before lunch  insulin lispro Injectable (HumaLOG) 16 Unit(s) SubCutaneous before dinner  insulin lispro Injectable (HumaLOG) 15 Unit(s) SubCutaneous before breakfast  metolazone 2.5 milliGRAM(s) Oral daily  metoprolol succinate  milliGRAM(s) Oral daily  nystatin Powder 1 Application(s) Topical every 12 hours  pregabalin 100 milliGRAM(s) Oral two times a day  senna 2 Tablet(s) Oral at bedtime    MEDICATIONS  (PRN):  acetaminophen   Tablet .. 650 milliGRAM(s) Oral every 4 hours PRN Temp greater or equal to 38.5C (101.3F), Mild Pain (1 - 3)  dextrose 40% Gel 15 Gram(s) Oral once PRN Blood Glucose LESS THAN 70 milliGRAM(s)/deciliter  glucagon  Injectable 1 milliGRAM(s) IntraMuscular once PRN Glucose LESS THAN 70 milligrams/deciliter  heparin  Injectable 21549 Unit(s) IV Push every 6 hours PRN For aPTT less than 40  heparin  Injectable 5000 Unit(s) IV Push every 6 hours PRN For aPTT between 40 - 57  oxyCODONE    IR 5 milliGRAM(s) Oral two times a day PRN Severe Pain (7 - 10)  sodium chloride 0.65% Nasal 1 Spray(s) Both Nostrils three times a day PRN Nasal Congestion    	    PHYSICAL EXAM:  T(C): 36.7 (11-19-19 @ 12:37), Max: 36.7 (11-19-19 @ 12:37)  HR: 110 (11-19-19 @ 12:37) (81 - 110)  BP: 133/55 (11-19-19 @ 12:37) (114/66 - 148/75)  RR: 18 (11-19-19 @ 12:37) (18 - 18)  SpO2: 100% (11-19-19 @ 12:37) (96% - 100%)  Wt(kg): --  I&O's Summary    18 Nov 2019 07:01  -  19 Nov 2019 07:00  --------------------------------------------------------  IN: 481 mL / OUT: 2100 mL / NET: -1619 mL    19 Nov 2019 07:01  -  19 Nov 2019 15:05  --------------------------------------------------------  IN: 0 mL / OUT: 1300 mL / NET: -1300 mL        Appearance: Normal	  HEENT:   Normal oral mucosa, PERRL, EOMI	  Lymphatic: No lymphadenopathy  Cardiovascular: Normal S1 S2, No JVD, No murmurs, No edema  Respiratory: dec bs   Psychiatry: A & O x 3, Mood & affect appropriate  Gastrointestinal:  Soft, Non-tender, + BS	  Skin: No rashes, No ecchymoses, No cyanosis	  Neurologic: Non-focal  Extremities: Normal range of motion, No clubbing, cyanosis or edema  Vascular: Peripheral pulses palpable 2+ bilaterally    TELEMETRY: 	    ECG:  	  RADIOLOGY:  OTHER: 	  	  LABS:	 	    CARDIAC MARKERS:                                14.6   9.93  )-----------( 294      ( 19 Nov 2019 02:50 )             47.2     11-19    138  |  93<L>  |  59<H>  ----------------------------<  139<H>  3.6   |  32<H>  |  1.74<H>    Ca    9.1      19 Nov 2019 02:50    TPro  7.4  /  Alb  2.9<L>  /  TBili  0.3  /  DBili  < 0.2  /  AST  18  /  ALT  8   /  AlkPhos  146<H>  11-17    proBNP:   Lipid Profile:   HgA1c:   TSH:

## 2019-11-20 LAB
ANION GAP SERPL CALC-SCNC: 18 MMO/L — HIGH (ref 7–14)
APTT BLD: 94.6 SEC — HIGH (ref 27.5–36.3)
BUN SERPL-MCNC: 59 MG/DL — HIGH (ref 7–23)
CALCIUM SERPL-MCNC: 9 MG/DL — SIGNIFICANT CHANGE UP (ref 8.4–10.5)
CHLORIDE SERPL-SCNC: 91 MMOL/L — LOW (ref 98–107)
CO2 SERPL-SCNC: 27 MMOL/L — SIGNIFICANT CHANGE UP (ref 22–31)
CREAT SERPL-MCNC: 1.61 MG/DL — HIGH (ref 0.5–1.3)
GLUCOSE BLDC GLUCOMTR-MCNC: 168 MG/DL — HIGH (ref 70–99)
GLUCOSE BLDC GLUCOMTR-MCNC: 217 MG/DL — HIGH (ref 70–99)
GLUCOSE BLDC GLUCOMTR-MCNC: 234 MG/DL — HIGH (ref 70–99)
GLUCOSE BLDC GLUCOMTR-MCNC: 235 MG/DL — HIGH (ref 70–99)
GLUCOSE SERPL-MCNC: 201 MG/DL — HIGH (ref 70–99)
HCT VFR BLD CALC: 52.3 % — HIGH (ref 39–50)
HGB BLD-MCNC: 15.7 G/DL — SIGNIFICANT CHANGE UP (ref 13–17)
INR BLD: 1.14 — SIGNIFICANT CHANGE UP (ref 0.88–1.17)
MCHC RBC-ENTMCNC: 25 PG — LOW (ref 27–34)
MCHC RBC-ENTMCNC: 30 % — LOW (ref 32–36)
MCV RBC AUTO: 83.4 FL — SIGNIFICANT CHANGE UP (ref 80–100)
NRBC # FLD: 0 K/UL — SIGNIFICANT CHANGE UP (ref 0–0)
PLATELET # BLD AUTO: 280 K/UL — SIGNIFICANT CHANGE UP (ref 150–400)
PMV BLD: 11.3 FL — SIGNIFICANT CHANGE UP (ref 7–13)
POTASSIUM SERPL-MCNC: 3.7 MMOL/L — SIGNIFICANT CHANGE UP (ref 3.5–5.3)
POTASSIUM SERPL-SCNC: 3.7 MMOL/L — SIGNIFICANT CHANGE UP (ref 3.5–5.3)
PROTHROM AB SERPL-ACNC: 12.7 SEC — SIGNIFICANT CHANGE UP (ref 9.8–13.1)
RBC # BLD: 6.27 M/UL — HIGH (ref 4.2–5.8)
RBC # FLD: 15.9 % — HIGH (ref 10.3–14.5)
SODIUM SERPL-SCNC: 136 MMOL/L — SIGNIFICANT CHANGE UP (ref 135–145)
WBC # BLD: 8.64 K/UL — SIGNIFICANT CHANGE UP (ref 3.8–10.5)
WBC # FLD AUTO: 8.64 K/UL — SIGNIFICANT CHANGE UP (ref 3.8–10.5)

## 2019-11-20 PROCEDURE — 99232 SBSQ HOSP IP/OBS MODERATE 35: CPT

## 2019-11-20 PROCEDURE — 71045 X-RAY EXAM CHEST 1 VIEW: CPT | Mod: 26

## 2019-11-20 RX ORDER — INSULIN LISPRO 100/ML
17 VIAL (ML) SUBCUTANEOUS
Refills: 0 | Status: DISCONTINUED | OUTPATIENT
Start: 2019-11-21 | End: 2019-11-21

## 2019-11-20 RX ADMIN — OXYCODONE HYDROCHLORIDE 5 MILLIGRAM(S): 5 TABLET ORAL at 14:30

## 2019-11-20 RX ADMIN — NYSTATIN CREAM 1 APPLICATION(S): 100000 CREAM TOPICAL at 05:48

## 2019-11-20 RX ADMIN — Medication 30 MILLIGRAM(S): at 05:48

## 2019-11-20 RX ADMIN — OXYCODONE HYDROCHLORIDE 5 MILLIGRAM(S): 5 TABLET ORAL at 13:54

## 2019-11-20 RX ADMIN — Medication 100 MILLIGRAM(S): at 05:47

## 2019-11-20 RX ADMIN — Medication 30 MILLIGRAM(S): at 22:07

## 2019-11-20 RX ADMIN — Medication 1: at 18:13

## 2019-11-20 RX ADMIN — Medication 2: at 13:01

## 2019-11-20 RX ADMIN — INSULIN GLARGINE 35 UNIT(S): 100 INJECTION, SOLUTION SUBCUTANEOUS at 10:39

## 2019-11-20 RX ADMIN — INSULIN GLARGINE 35 UNIT(S): 100 INJECTION, SOLUTION SUBCUTANEOUS at 22:08

## 2019-11-20 RX ADMIN — Medication 16 UNIT(S): at 18:13

## 2019-11-20 RX ADMIN — HEPARIN SODIUM 2300 UNIT(S)/HR: 5000 INJECTION INTRAVENOUS; SUBCUTANEOUS at 08:30

## 2019-11-20 RX ADMIN — Medication 30 MILLIGRAM(S): at 13:01

## 2019-11-20 RX ADMIN — Medication 2: at 08:39

## 2019-11-20 RX ADMIN — Medication 81 MILLIGRAM(S): at 13:01

## 2019-11-20 RX ADMIN — NYSTATIN CREAM 1 APPLICATION(S): 100000 CREAM TOPICAL at 18:13

## 2019-11-20 RX ADMIN — Medication 100 MILLIGRAM(S): at 18:12

## 2019-11-20 RX ADMIN — BUMETANIDE 2 MILLIGRAM(S): 0.25 INJECTION INTRAMUSCULAR; INTRAVENOUS at 05:48

## 2019-11-20 RX ADMIN — Medication 100 MILLIGRAM(S): at 22:07

## 2019-11-20 RX ADMIN — Medication 12 UNIT(S): at 13:01

## 2019-11-20 RX ADMIN — Medication 15 UNIT(S): at 08:39

## 2019-11-20 NOTE — PROGRESS NOTE ADULT - SUBJECTIVE AND OBJECTIVE BOX
CC: no cp/sob    TELEMETRY: afib    PHYSICAL EXAM:    T(C): 36.7 (11-20-19 @ 08:31), Max: 36.7 (11-19-19 @ 12:37)  HR: 85 (11-20-19 @ 08:31) (77 - 110)  BP: 103/53 (11-20-19 @ 08:31) (103/53 - 145/68)  RR: 18 (11-20-19 @ 08:31) (17 - 19)  SpO2: 98% (11-20-19 @ 08:31) (96% - 100%)  Wt(kg): --  I&O's Summary    19 Nov 2019 07:01  -  20 Nov 2019 07:00  --------------------------------------------------------  IN: 264 mL / OUT: 2050 mL / NET: -1786 mL    20 Nov 2019 07:01  -  20 Nov 2019 10:25  --------------------------------------------------------  IN: 46 mL / OUT: 800 mL / NET: -754 mL        Appearance: Normal	  Cardiovascular: Normal S1 S2,RRR, No JVD, No murmurs  Respiratory: dec bs at the right base	  Gastrointestinal:  Soft, Non-tender, + BS	  Extremities: Normal range of motion, No clubbing, cyanosis or edema  Vascular: Peripheral pulses palpable 2+ bilaterally     LABS:	 	                          15.7   8.64  )-----------( 280      ( 20 Nov 2019 06:00 )             52.3     11-20    136  |  91<L>  |  59<H>  ----------------------------<  201<H>  3.7   |  27  |  1.61<H>    Ca    9.0      20 Nov 2019 06:00        PT/INR - ( 20 Nov 2019 06:00 )   PT: 12.7 SEC;   INR: 1.14          PTT - ( 20 Nov 2019 06:00 )  PTT:94.6 SEC    CARDIAC MARKERS:        MEDICATIONS  (STANDING):  aspirin enteric coated 81 milliGRAM(s) Oral daily  buMETAnide 2 milliGRAM(s) Oral daily  dextrose 5%. 1000 milliLiter(s) (50 mL/Hr) IV Continuous <Continuous>  dextrose 50% Injectable 12.5 Gram(s) IV Push once  dextrose 50% Injectable 25 Gram(s) IV Push once  dextrose 50% Injectable 25 Gram(s) IV Push once  diltiazem    Tablet 30 milliGRAM(s) Oral three times a day  heparin  Infusion.  Unit(s)/Hr (24 mL/Hr) IV Continuous <Continuous>  insulin glargine Injectable (LANTUS) 35 Unit(s) SubCutaneous every morning  insulin glargine Injectable (LANTUS) 35 Unit(s) SubCutaneous at bedtime  insulin lispro (HumaLOG) corrective regimen sliding scale   SubCutaneous three times a day before meals  insulin lispro (HumaLOG) corrective regimen sliding scale   SubCutaneous at bedtime  insulin lispro Injectable (HumaLOG) 12 Unit(s) SubCutaneous before lunch  insulin lispro Injectable (HumaLOG) 16 Unit(s) SubCutaneous before dinner  insulin lispro Injectable (HumaLOG) 15 Unit(s) SubCutaneous before breakfast  metolazone 2.5 milliGRAM(s) Oral daily  metoprolol succinate  milliGRAM(s) Oral daily  nystatin Powder 1 Application(s) Topical every 12 hours  pregabalin 100 milliGRAM(s) Oral two times a day  senna 2 Tablet(s) Oral at bedtime

## 2019-11-20 NOTE — PROGRESS NOTE ADULT - SUBJECTIVE AND OBJECTIVE BOX
Chief Complaint: DM 2    History: Patient seen at bedside. Endorses snacking on breadsticks from [employer] overnight. BG this  mg/dl, pre-lunch 234 mg/dl. No n/v, no s/s of hypoglycemia.     MEDICATIONS  (STANDING):  aspirin enteric coated 81 milliGRAM(s) Oral daily  buMETAnide 2 milliGRAM(s) Oral daily  dextrose 5%. 1000 milliLiter(s) (50 mL/Hr) IV Continuous <Continuous>  dextrose 50% Injectable 12.5 Gram(s) IV Push once  dextrose 50% Injectable 25 Gram(s) IV Push once  dextrose 50% Injectable 25 Gram(s) IV Push once  diltiazem    Tablet 30 milliGRAM(s) Oral three times a day  heparin  Infusion.  Unit(s)/Hr (24 mL/Hr) IV Continuous <Continuous>  insulin glargine Injectable (LANTUS) 35 Unit(s) SubCutaneous every morning  insulin glargine Injectable (LANTUS) 35 Unit(s) SubCutaneous at bedtime  insulin lispro (HumaLOG) corrective regimen sliding scale   SubCutaneous three times a day before meals  insulin lispro (HumaLOG) corrective regimen sliding scale   SubCutaneous at bedtime  insulin lispro Injectable (HumaLOG) 12 Unit(s) SubCutaneous before lunch  insulin lispro Injectable (HumaLOG) 16 Unit(s) SubCutaneous before dinner  insulin lispro Injectable (HumaLOG) 15 Unit(s) SubCutaneous before breakfast  metolazone 2.5 milliGRAM(s) Oral daily  metoprolol succinate  milliGRAM(s) Oral daily  nystatin Powder 1 Application(s) Topical every 12 hours  pregabalin 100 milliGRAM(s) Oral two times a day  senna 2 Tablet(s) Oral at bedtime    MEDICATIONS  (PRN):  acetaminophen   Tablet .. 650 milliGRAM(s) Oral every 4 hours PRN Temp greater or equal to 38.5C (101.3F), Mild Pain (1 - 3)  dextrose 40% Gel 15 Gram(s) Oral once PRN Blood Glucose LESS THAN 70 milliGRAM(s)/deciliter  glucagon  Injectable 1 milliGRAM(s) IntraMuscular once PRN Glucose LESS THAN 70 milligrams/deciliter  heparin  Injectable 57520 Unit(s) IV Push every 6 hours PRN For aPTT less than 40  heparin  Injectable 5000 Unit(s) IV Push every 6 hours PRN For aPTT between 40 - 57  oxyCODONE    IR 5 milliGRAM(s) Oral two times a day PRN Severe Pain (7 - 10)  sodium chloride 0.65% Nasal 1 Spray(s) Both Nostrils three times a day PRN Nasal Congestion    No Known Allergies    Review of Systems:  HEENT: No pain  Cardiovascular: No chest pain  Respiratory: No SOB  GI: No nausea, vomiting    PHYSICAL EXAM:  VITALS: T(C): 36.5 (11-20-19 @ 12:46)  T(F): 97.7 (11-20-19 @ 12:46), Max: 98 (11-20-19 @ 08:31)  HR: 102 (11-20-19 @ 12:46) (77 - 102)  BP: 141/74 (11-20-19 @ 12:46) (103/53 - 145/68)  RR:  (17 - 19)  SpO2:  (94% - 99%)  Wt(kg): --  GENERAL: NAD  EYES: No proptosis, anicteric  HEENT:  Atraumatic, Normocephalic, moist mucous membranes  RESPIRATORY: unlabored respirations   PSYCH: Alert and oriented x 3, normal affect, normal mood    CAPILLARY BLOOD GLUCOSE    POCT Blood Glucose.: 234 mg/dL (20 Nov 2019 12:45)  POCT Blood Glucose.: 217 mg/dL (20 Nov 2019 08:31)  POCT Blood Glucose.: 110 mg/dL (19 Nov 2019 21:46)  POCT Blood Glucose.: 157 mg/dL (19 Nov 2019 17:46)      11-20    136  |  91<L>  |  59<H>  ----------------------------<  201<H>  3.7   |  27  |  1.61<H>    EGFR if : 52  EGFR if non : 45    Ca    9.0      11-20    TPro  7.4  /  Alb  2.9<L>  /  TBili  0.3  /  DBili  < 0.2  /  AST  18  /  ALT  8   /  AlkPhos  146<H>  11-17        Hemoglobin A1C, Whole Blood: 11.7 % <H> [4.0 - 5.6] (11-07-19 @ 09:14)  Hemoglobin A1C, Whole Blood: 10.3 % <H> [4.0 - 5.6] (10-17-19 @ 07:43)

## 2019-11-20 NOTE — PROGRESS NOTE ADULT - SUBJECTIVE AND OBJECTIVE BOX
Subjective: no acute complaints    Vital Signs:  Vital Signs Last 24 Hrs  T(C): 36.7 (11-20-19 @ 08:31), Max: 36.7 (11-19-19 @ 12:37)  T(F): 98 (11-20-19 @ 08:31), Max: 98 (11-19-19 @ 12:37)  HR: 85 (11-20-19 @ 08:31) (77 - 110)  BP: 103/53 (11-20-19 @ 08:31) (103/53 - 145/68)  RR: 18 (11-20-19 @ 08:31) (17 - 19)  SpO2: 98% (11-20-19 @ 08:31) (96% - 100%) on (O2)    Telemetry/Alarms:  General: WN/WD NAD  Neurology: Awake, nonfocal, ROA x 4  Eyes: Scleras clear, PERRLA/ EOMI, Gross vision intact  ENT:Gross hearing intact, grossly patent pharynx, no stridor  Neck: Neck supple, trachea midline, No JVD,   Respiratory: CTA B/L, No wheezing, rales, rhonchi  CV: RRR, S1S2, no murmurs, rubs or gallops  Abdominal: Soft, NT, ND +BS,   Extremities: No edema, + peripheral pulses  Skin: No Rashes, Hematoma, Ecchymosis  Lymphatic: No Neck, axilla, groin LAD  Psych: Oriented x 3, normal affect    Relevant labs, radiology and Medications reviewed                        15.7   8.64  )-----------( 280      ( 20 Nov 2019 06:00 )             52.3     11-20    136  |  91<L>  |  59<H>  ----------------------------<  201<H>  3.7   |  27  |  1.61<H>    Ca    9.0      20 Nov 2019 06:00      PT/INR - ( 20 Nov 2019 06:00 )   PT: 12.7 SEC;   INR: 1.14          PTT - ( 20 Nov 2019 06:00 )  PTT:94.6 SEC  MEDICATIONS  (STANDING):  aspirin enteric coated 81 milliGRAM(s) Oral daily  buMETAnide 2 milliGRAM(s) Oral daily  dextrose 5%. 1000 milliLiter(s) (50 mL/Hr) IV Continuous <Continuous>  dextrose 50% Injectable 12.5 Gram(s) IV Push once  dextrose 50% Injectable 25 Gram(s) IV Push once  dextrose 50% Injectable 25 Gram(s) IV Push once  diltiazem    Tablet 30 milliGRAM(s) Oral three times a day  heparin  Infusion.  Unit(s)/Hr (24 mL/Hr) IV Continuous <Continuous>  insulin glargine Injectable (LANTUS) 35 Unit(s) SubCutaneous every morning  insulin glargine Injectable (LANTUS) 35 Unit(s) SubCutaneous at bedtime  insulin lispro (HumaLOG) corrective regimen sliding scale   SubCutaneous three times a day before meals  insulin lispro (HumaLOG) corrective regimen sliding scale   SubCutaneous at bedtime  insulin lispro Injectable (HumaLOG) 12 Unit(s) SubCutaneous before lunch  insulin lispro Injectable (HumaLOG) 16 Unit(s) SubCutaneous before dinner  insulin lispro Injectable (HumaLOG) 15 Unit(s) SubCutaneous before breakfast  metolazone 2.5 milliGRAM(s) Oral daily  metoprolol succinate  milliGRAM(s) Oral daily  nystatin Powder 1 Application(s) Topical every 12 hours  pregabalin 100 milliGRAM(s) Oral two times a day  senna 2 Tablet(s) Oral at bedtime    MEDICATIONS  (PRN):  acetaminophen   Tablet .. 650 milliGRAM(s) Oral every 4 hours PRN Temp greater or equal to 38.5C (101.3F), Mild Pain (1 - 3)  dextrose 40% Gel 15 Gram(s) Oral once PRN Blood Glucose LESS THAN 70 milliGRAM(s)/deciliter  glucagon  Injectable 1 milliGRAM(s) IntraMuscular once PRN Glucose LESS THAN 70 milligrams/deciliter  heparin  Injectable 94091 Unit(s) IV Push every 6 hours PRN For aPTT less than 40  heparin  Injectable 5000 Unit(s) IV Push every 6 hours PRN For aPTT between 40 - 57  oxyCODONE    IR 5 milliGRAM(s) Oral two times a day PRN Severe Pain (7 - 10)  sodium chloride 0.65% Nasal 1 Spray(s) Both Nostrils three times a day PRN Nasal Congestion    Pertinent Physical Exam  I&O's Summary    19 Nov 2019 07:01  -  20 Nov 2019 07:00  --------------------------------------------------------  IN: 264 mL / OUT: 2050 mL / NET: -1786 mL    20 Nov 2019 07:01  -  20 Nov 2019 09:40  --------------------------------------------------------  IN: 46 mL / OUT: 800 mL / NET: -754 mL        Assessment    64 year old male with Hypertension, Hyperlipidemia, Atrial Fibrillation, s/p DCCV, Coronary Artery Disease, NSTEMI, (2/2014), s/p PCI to mid LAD, Proximal LCx, Distal LCx (2/14, [facility], [facility]), Congestive Heart Failure, Normal Ejection Fraction, Diabetes Mellitus, CVA, Severe Left Internal Carotid Disease, s/p CEA (2/2014) presenting with worsening SOB, cough found to have recurrent right sided pleural effusion and hydropneumothorax  Pt has thoracentesis 11/8  and Pigtail catheter placed that was removed 11/5   Thoracic surgery was consulted, plans for Right VATs with decortication.  Patient with recent TTE with no evidence of pericardial effusion, mod LAE, grossly borderline LV sys dysfx EF 45% likely underestimated due to AF.   During hospital course, patient was acute gout flare, rheumatology consulted, multiple one time doses of colchicine given, patient refusing doses of steroids due to uncontrolled blood sugars.    Endocrine consulted for uncontrolled blood sugars, insulin doses were adjusted accordingly.  Patient stable for transfer to [facility] for Right VATs with decortication.   Patient transferred to [facility] on 11/17/19 11/19 Chest tube removed yesterday. CT chest today        PLAN  Neuro: Pain managementwit PO pain med as needed  Pulm: Encourage coughing, deep breathing and use of incentive spirometry. Daily CXR.   Cardio: Monitor telemetry/alarms. Echo with EF 45%. Afib on toprol. s/p MVR on heparin gtt, f/u PTT adjust as needed. Cardiology following; cleared for surgery moderate cv risk.   GI: Tolerating diet. Continue stool softeners.  Renal: monitor urine output, supplement electrolytes as needed. CKD at baseline, urinating.   Vasc: Heparin SC/SCDs for DVT prophylaxis  Heme: Stable H/H. Hep gtt for MVR  ID: Off antibiotics. Stable. No white count, no fevers recoreded  Therapy: OOB/ambulate  Disposition: f/u surgical plan with Dr Camargo  Discussed with Cardiothoracic Team at AM rounds.

## 2019-11-20 NOTE — PROGRESS NOTE ADULT - PROBLEM SELECTOR PLAN 1
-BG target 100-180 mg/dl; patient above target today, suspect AM hyperglycemia was related to overnight snacking  -Please continue to check BG before meals and bedtime   -Continue Lantus 35 units SQ BID for now  -Will increase Humalog to 17 units before breakfast; Humalog 12 units pre-lunch and Humalog 16 units pre-dinner.   -Continue with Humalog low dose correctional scale before meals and low dose scale at bedtime   -Consistent carbohydrate diet; recommend avoiding in between meal snacking    Discharge Plan:  -Plan for discharge to resume basal/bolus insulin pens. DC planning for tomorrow per team. If within BG target, can discharge on current doses as above. No sliding scale for discharge.  -Patient should see opthalmology and PCP after discharge and yearly  -Note, patient spoke at length regarding "[place]" Pasta which he reports is higher fiber pasta product; he consumes this every night, about 3 oz per night. Carbohydrate amount of this product is listed as 41g per 2 oz serving. Would recommend decreasing overall pasta intake within his diet and following consistent carbohydrate diet at home (recommend 45g carbs for breakfast, 60g for lunch and 60g dinner).   -Endocrine follow up with Dr. Real, Friday January 3, 2020 at 2:15 PM, [address], [address], [facility] [place] [address]

## 2019-11-21 ENCOUNTER — TRANSCRIPTION ENCOUNTER (OUTPATIENT)
Age: 64
End: 2019-11-21

## 2019-11-21 VITALS — SYSTOLIC BLOOD PRESSURE: 109 MMHG | HEART RATE: 89 BPM | DIASTOLIC BLOOD PRESSURE: 67 MMHG

## 2019-11-21 LAB — GLUCOSE BLDC GLUCOMTR-MCNC: 152 MG/DL — HIGH (ref 70–99)

## 2019-11-21 PROCEDURE — 99238 HOSP IP/OBS DSCHRG MGMT 30/<: CPT

## 2019-11-21 PROCEDURE — 99232 SBSQ HOSP IP/OBS MODERATE 35: CPT

## 2019-11-21 RX ORDER — INSULIN LISPRO 100/ML
12 VIAL (ML) SUBCUTANEOUS
Refills: 0 | Status: DISCONTINUED | OUTPATIENT
Start: 2019-11-21 | End: 2019-11-21

## 2019-11-21 RX ORDER — INSULIN LISPRO 100/ML
12 VIAL (ML) SUBCUTANEOUS
Qty: 0 | Refills: 0 | DISCHARGE

## 2019-11-21 RX ORDER — ENOXAPARIN SODIUM 100 MG/ML
130 INJECTION SUBCUTANEOUS
Qty: 0 | Refills: 0 | DISCHARGE

## 2019-11-21 RX ORDER — INSULIN LISPRO 100/ML
17 VIAL (ML) SUBCUTANEOUS
Refills: 0 | Status: DISCONTINUED | OUTPATIENT
Start: 2019-11-21 | End: 2019-11-21

## 2019-11-21 RX ORDER — INSULIN LISPRO 100/ML
17 VIAL (ML) SUBCUTANEOUS
Qty: 1 | Refills: 0
Start: 2019-11-21 | End: 2019-12-20

## 2019-11-21 RX ORDER — DILTIAZEM HCL 120 MG
120 CAPSULE, EXT RELEASE 24 HR ORAL DAILY
Refills: 0 | Status: DISCONTINUED | OUTPATIENT
Start: 2019-11-21 | End: 2019-11-21

## 2019-11-21 RX ORDER — INSULIN LISPRO 100/ML
14 VIAL (ML) SUBCUTANEOUS
Qty: 0 | Refills: 0 | DISCHARGE
Start: 2019-11-21

## 2019-11-21 RX ORDER — INSULIN LISPRO 100/ML
16 VIAL (ML) SUBCUTANEOUS
Qty: 1 | Refills: 0
Start: 2019-11-21

## 2019-11-21 RX ORDER — INSULIN LISPRO 100/ML
22 VIAL (ML) SUBCUTANEOUS
Qty: 0 | Refills: 0 | DISCHARGE

## 2019-11-21 RX ORDER — NYSTATIN CREAM 100000 [USP'U]/G
1 CREAM TOPICAL
Qty: 30 | Refills: 0
Start: 2019-11-21 | End: 2019-12-04

## 2019-11-21 RX ORDER — INSULIN LISPRO 100/ML
0 VIAL (ML) SUBCUTANEOUS
Qty: 0 | Refills: 0 | DISCHARGE

## 2019-11-21 RX ORDER — INSULIN LISPRO 100/ML
20 VIAL (ML) SUBCUTANEOUS
Qty: 0 | Refills: 0 | DISCHARGE

## 2019-11-21 RX ORDER — DILTIAZEM HCL 120 MG
1 CAPSULE, EXT RELEASE 24 HR ORAL
Qty: 30 | Refills: 0
Start: 2019-11-21 | End: 2019-12-20

## 2019-11-21 RX ORDER — INSULIN GLARGINE 100 [IU]/ML
35 INJECTION, SOLUTION SUBCUTANEOUS
Qty: 0 | Refills: 0 | DISCHARGE

## 2019-11-21 RX ORDER — INSULIN LISPRO 100/ML
16 VIAL (ML) SUBCUTANEOUS
Refills: 0 | Status: DISCONTINUED | OUTPATIENT
Start: 2019-11-21 | End: 2019-11-21

## 2019-11-21 RX ORDER — INSULIN LISPRO 100/ML
12 VIAL (ML) SUBCUTANEOUS
Qty: 1 | Refills: 0
Start: 2019-11-21

## 2019-11-21 RX ADMIN — Medication 81 MILLIGRAM(S): at 11:49

## 2019-11-21 RX ADMIN — Medication 120 MILLIGRAM(S): at 12:00

## 2019-11-21 RX ADMIN — Medication 17 UNIT(S): at 08:52

## 2019-11-21 RX ADMIN — Medication 30 MILLIGRAM(S): at 05:45

## 2019-11-21 RX ADMIN — Medication 100 MILLIGRAM(S): at 05:45

## 2019-11-21 RX ADMIN — INSULIN GLARGINE 35 UNIT(S): 100 INJECTION, SOLUTION SUBCUTANEOUS at 10:20

## 2019-11-21 RX ADMIN — BUMETANIDE 2 MILLIGRAM(S): 0.25 INJECTION INTRAMUSCULAR; INTRAVENOUS at 05:45

## 2019-11-21 RX ADMIN — Medication 1: at 08:51

## 2019-11-21 NOTE — DISCHARGE NOTE NURSING/CASE MANAGEMENT/SOCIAL WORK - NSDCPEPRADAXA_GEN_ALL_CORE
Dabigatran/Pradaxa - Dietary Advice/Dabigatran/Pradaxa - Compliance/Dabigatran/Pradaxa - Follow up monitoring/Dabigatran/Pradaxa - Potential for adverse drug reactions and interactions

## 2019-11-21 NOTE — PROGRESS NOTE ADULT - SUBJECTIVE AND OBJECTIVE BOX
CARDIOLOGY FOLLOW UP - Dr. Mazariegos    CC no cp or sob       PHYSICAL EXAM:  T(C): 36.3 (11-21-19 @ 07:45), Max: 36.6 (11-20-19 @ 19:46)  HR: 83 (11-21-19 @ 07:45) (83 - 102)  BP: 133/74 (11-21-19 @ 07:45) (133/74 - 157/68)  RR: 16 (11-21-19 @ 07:45) (16 - 18)  SpO2: 100% (11-21-19 @ 07:45) (94% - 100%)  Wt(kg): --  I&O's Summary    20 Nov 2019 07:01  -  21 Nov 2019 07:00  --------------------------------------------------------  IN: 276 mL / OUT: 2200 mL / NET: -1924 mL    21 Nov 2019 07:01  -  21 Nov 2019 11:55  --------------------------------------------------------  IN: 46 mL / OUT: 800 mL / NET: -754 mL        Appearance: Normal	  Cardiovascular: Normal S1 S2, irregular    Respiratory: Lungs clear to auscultation	  Gastrointestinal:  Soft, Non-tender, + BS	  Extremities: Normal range of motion, No clubbing, cyanosis or edema        MEDICATIONS  (STANDING):  aspirin enteric coated 81 milliGRAM(s) Oral daily  buMETAnide 2 milliGRAM(s) Oral daily  dextrose 5%. 1000 milliLiter(s) (50 mL/Hr) IV Continuous <Continuous>  dextrose 50% Injectable 12.5 Gram(s) IV Push once  dextrose 50% Injectable 25 Gram(s) IV Push once  dextrose 50% Injectable 25 Gram(s) IV Push once  diltiazem    milliGRAM(s) Oral daily  insulin glargine Injectable (LANTUS) 35 Unit(s) SubCutaneous every morning  insulin glargine Injectable (LANTUS) 35 Unit(s) SubCutaneous at bedtime  insulin lispro (HumaLOG) corrective regimen sliding scale   SubCutaneous three times a day before meals  insulin lispro (HumaLOG) corrective regimen sliding scale   SubCutaneous at bedtime  insulin lispro Injectable (HumaLOG) 12 Unit(s) SubCutaneous before lunch  insulin lispro Injectable (HumaLOG) 16 Unit(s) SubCutaneous before dinner  insulin lispro Injectable (HumaLOG) 17 Unit(s) SubCutaneous before breakfast  metolazone 2.5 milliGRAM(s) Oral daily  metoprolol succinate  milliGRAM(s) Oral daily  nystatin Powder 1 Application(s) Topical every 12 hours  pregabalin 100 milliGRAM(s) Oral two times a day  senna 2 Tablet(s) Oral at bedtime      TELEMETRY: 	    ECG:  	  RADIOLOGY:   DIAGNOSTIC TESTING:  [ ] Echocardiogram:  [ ]  Catheterization:  [ ] Stress Test:    OTHER: 	    LABS:	 	                            15.7   8.64  )-----------( 280      ( 20 Nov 2019 06:00 )             52.3     11-20    136  |  91<L>  |  59<H>  ----------------------------<  201<H>  3.7   |  27  |  1.61<H>    Ca    9.0      20 Nov 2019 06:00      PT/INR - ( 20 Nov 2019 06:00 )   PT: 12.7 SEC;   INR: 1.14          PTT - ( 20 Nov 2019 06:00 )  PTT:94.6 SEC

## 2019-11-21 NOTE — DISCHARGE NOTE PROVIDER - NSDCFUADDINST_GEN_ALL_CORE_FT
Please call Dr Camargo;'s office to schedule follow up appointment in 2 weeks. Please have a chest xray done 1-2 days before your appointment, and bring a copy with you to the office. Call Dr Camargo's office if you develop shortness of breath or fever

## 2019-11-21 NOTE — PROGRESS NOTE ADULT - ASSESSMENT
63 y/o M with PMH of CAD s/p stents, HFrEF (EF 40% 12/2018), DMT2, HTN, CKD, NSTEMI, Afib (on Pradaxa). Presents to ED with worsening SOB and productive cough with .   pt  recently hospitalized last month and found to have small R hydropneumothorax of unclear etiology. Plans previous admission for drainage of effusion and sampling of fluid and analysis, but patient refused at that time and agreed for outpt f/u. presented  w/ sob       Problem: Hydropneumothorax.   CT from previous admission with R sided hydropneumothorax of unclear etiology.  s/p thorocentesis/c/w exudate  s/p pigtail cath   s/p  ct  removed  pulm f/u noted   thorc sx eval noted  transferred for thoracic sx  await decision from thoracic   ct chest today        ·  Problem: CHF (congestive heart failure).  Recommendation: -Keep O>I as tolerated.   c/w current meds  cards eval noted    dm /uncontrolled  fsg riss  c/w insulin regimen as per endo in ns        dm neuropathy  c/w lyrica  oxycodone for severe pain     alex/ ckd resolved   renal f/u if needed    l knee pain improved  gout likely   discussed w/ renal  colcrys added  uric acid noted     hypokalemia  supp k+
63 y/o M with PMH of CAD s/p stents, HFrEF (EF 40% 12/2018), DMT2, HTN, CKD, NSTEMI, Afib (on Pradaxa). Presents to ED with worsening SOB and productive cough with .   pt  recently hospitalized last month and found to have small R hydropneumothorax of unclear etiology. Plans previous admission for drainage of effusion and sampling of fluid and analysis, but patient refused at that time and agreed for outpt f/u. presented  w/ sob       Problem: Hydropneumothorax.   CT from previous admission with R sided hydropneumothorax of unclear etiology.  s/p thorocentesis/c/w exudate  s/p pigtail cath   s/p  ct  removed  pulm f/u noted   thorc sx eval noted  transferred for thoracic sx  ct chest noted  no sx as per thoracic       ·  Problem: CHF (congestive heart failure).  Recommendation: -Keep O>I as tolerated.   c/w current meds  cards eval noted    dm /uncontrolled  fsg riss  c/w insulin regimen as per endo in ns        dm neuropathy  c/w lyrica  oxycodone for severe pain     alex/ ckd resolved   renal f/u if needed    l knee pain improved  gout likely   discussed w/ renal  colcrys added  uric acid noted     hypokalemia better       d.c planning
63 y/o male with hx of uncontrolled T2DM c/b neuropathy, CKD3, macrovascular disease (CAD, TIA, left carotid artery stenosis). Also h/o HTN/HLD/Afib/CHF. Here with SOB and cough secondary to right hydropneumothorax > s/p placement of pigtail cath insertion for pleural effusion. Transferred from [facility] to [facility].
64 year old male with Hypertension, Hyperlipidemia, Atrial Fibrillation, s/p DCCV, Coronary Artery Disease, NSTEMI, (2/2014), s/p PCI to mid LAD, Proximal LCx, Distal LCx (2/14, [facility]), Congestive Heart Failure, Normal Ejection Fraction, Diabetes Mellitus, CVA, Severe Left Internal Carotid Disease, s/p CEA (2/2014) transfer to [facility] for Right VATs with decortication.     1.Right-sided Pleural effusion, Hydropneumothorax  -s/p right pleural pigtail catheter, cont drainage-serosanguinous fluid   -s/p right thoracentesis 11/8/0219, bloody exudate  -cyto negative for malignant cells  -chest tube removed  -Thoracic surgery to f/u  possible OR this week  -pt optimized for OR can proceed w moderate cv risk .      2. Chronic Congestive heart failure, Diastolic.   -volume status stable  -continue bumex, metol  -recent echo with no evidence of pericardial effusion, mod LAE, grossly borderline LV sys dysfx EF 45% likely underestimated due to AF     3. Atrial Fibrillation, chronic   -rates 90s- 11-  -c/w metoprolol succinate ER 100mg daily  -c/w cardizem 30 mg PO TID   -a/c on hep gtt    4. Coronary Artery Disease. S/P PCI to LAD, LCx.  -stable, Continue ASA 81mg and statin    5. Acute gout , elevated uric acid  -improved  -rheum f/u    dvt ppx
64 year old male with Hypertension, Hyperlipidemia, Atrial Fibrillation, s/p DCCV, Coronary Artery Disease, NSTEMI, (2/2014), s/p PCI to mid LAD, Proximal LCx, Distal LCx (2/14, [facility], [facility]), Congestive Heart Failure, Normal Ejection Fraction, Diabetes Mellitus, CVA, Severe Left Internal Carotid Disease, s/p CEA (2/2014) transfer to [facility] for Right VATs with decortication.     1.Right-sided Pleural effusion, Hydropneumothorax  -s/p right pleural pigtail catheter, cont drainage-serosanguinous fluid   -s/p right thoracentesis 11/8/0219, bloody exudate  -cyto negative for malignant cells  -chest tube removed  -repeat CT shows stable unchanged hydropneumothorax  -Thoracic surgery following, defer surgery at this time if pt remains clinically stable with adequate oxygenation  -if surgery is still considered as outpt, pt remains optimized for OR can proceed w moderate cv risk .  -DC planning today       2. Chronic Congestive heart failure, Diastolic.   -volume status stable  -continue bumex, metol  -recent echo with no evidence of pericardial effusion, mod LAE, grossly borderline LV sys dysfx EF 45% likely underestimated due to AF     3. Atrial Fibrillation, chronic   -rates stable   -c/w metoprolol succinate ER 100mg daily  -change cardizem to cardizem  mg daily    -a/c, Resume  pradaxa 150mg BID    4. Coronary Artery Disease. S/P PCI to LAD, LCx.  -stable, Continue ASA 81mg and statin    5. Acute gout , elevated uric acid  -improved  -rheum f/u    dvt ppx    dc planning f/u for  12/ 3 at 1030 am
64 year old male with Hypertension, Hyperlipidemia, Atrial Fibrillation, s/p DCCV, Coronary Artery Disease, NSTEMI, (2/2014), s/p PCI to mid LAD, Proximal LCx, Distal LCx (2/14, [facility], [facility]), Congestive Heart Failure, Normal Ejection Fraction, Diabetes Mellitus, CVA, Severe Left Internal Carotid Disease, s/p CEA (2/2014) transfer to [facility] for Right VATs with decortication.     1.Right-sided Pleural effusion, Hydropneumothorax  -s/p right pleural pigtail catheter, cont drainage-serosanguinous fluid   -s/p right thoracentesis 11/8/0219, bloody exudate  -cyto negative for malignant cells  -chest tube removed  -repeat CT shows stable unchanged hydropneumothorax  -Thoracic surgery following, may defer surgery at this time if pt remains clinically stable with adequate oxygenation  -if surgery is still considered pt remains optimized for OR can proceed w moderate cv risk .  -from CV perspective if no intervention planned can begin DC planning       2. Chronic Congestive heart failure, Diastolic.   -volume status stable  -continue bumex, metol  -recent echo with no evidence of pericardial effusion, mod LAE, grossly borderline LV sys dysfx EF 45% likely underestimated due to AF     3. Atrial Fibrillation, chronic   -rates 90s- 11-  -c/w metoprolol succinate ER 100mg daily  -c/w cardizem 30 mg PO TID   -a/c on hep gtt    4. Coronary Artery Disease. S/P PCI to LAD, LCx.  -stable, Continue ASA 81mg and statin    5. Acute gout , elevated uric acid  -improved  -rheum f/u    dvt ppx
65 y/o male with hx of uncontrolled T2DM c/b neuropathy, CKD3, macrovascular disease (CAD, TIA, left carotid artery stenosis). Also h/o HTN/HLD/Afib/CHF. Here with SOB and cough secondary to right hydropneumothorax > s/p placement of pigtail cath insertion for pleural effusion. Transferred from [facility] to [facility].
65 y/o male with hx of uncontrolled T2DM c/b neuropathy, CKD3, macrovascular disease (CAD, TIA, left carotid artery stenosis). Also h/o HTN/HLD/Afib/CHF. Here with SOB and cough secondary to right hydropneumothorax > s/p placement of pigtail cath insertion for pleural effusion. Transferred from [facility] to [facility].
65 y/o male with hx of uncontrolled T2DM c/b neuropathy, CKD3, macrovascular disease (CAD, TIA, left carotid artery stenosis). Also h/o HTN/HLD/Afib/CHF. Here with SOB and cough secondary to right hydropneumothorax > s/p placement of pigtail cath insertion for pleural effusion. Transferred from [facility] to [facility].
65 y/o M with PMH of CAD s/p stents, HFrEF (EF 40% 12/2018), DMT2, HTN, CKD, NSTEMI, Afib (on Pradaxa). Presents to ED with worsening SOB and productive cough with .   pt  recently hospitalized last month and found to have small R hydropneumothorax of unclear etiology. Plans previous admission for drainage of effusion and sampling of fluid and analysis, but patient refused at that time and agreed for outpt f/u. presented  w/ sob       Problem: Hydropneumothorax.   CT from previous admission with R sided hydropneumothorax of unclear etiology.  s/p thorocentesis/c/w exudate  s/p pigtail cath   s/p  ct  removed  pulm f/u noted   thorc sx eval noted  transferred for thoracic sx       ·  Problem: CHF (congestive heart failure).  Recommendation: -Keep O>I as tolerated.   c/w current meds  cards eval noted    dm /uncontrolled  fsg riss  c/w insulin regimen as per endo in ns        dm neuropathy  c/w lyrica  oxycodone for severe pain     alex/ ckd resolved   renal f/u if needed    l knee pain improved  gout likely   discussed w/ renal  colcrys added  uric acid noted     hypokalemia  supp k+

## 2019-11-21 NOTE — DISCHARGE NOTE NURSING/CASE MANAGEMENT/SOCIAL WORK - NSDCPELOVENOX_GEN_ALL_CORE
Enoxaparin/Lovenox - Follow up monitoring/Enoxaparin/Lovenox - Potential for adverse drug reactions and interactions/Enoxaparin/Lovenox - Compliance/Enoxaparin/Lovenox - Dietary Advice

## 2019-11-21 NOTE — PROGRESS NOTE ADULT - SUBJECTIVE AND OBJECTIVE BOX
Chief Complaint: DM 2    History: Patient seen at bedside. Reports he is feeling well, denies n/v, denies s/s of hypoglycemia. BG this AM stable before breakfast at 152 mg/dl. Per primary team, patient due for discharge today.    MEDICATIONS  (STANDING):  aspirin enteric coated 81 milliGRAM(s) Oral daily  buMETAnide 2 milliGRAM(s) Oral daily  dextrose 5%. 1000 milliLiter(s) (50 mL/Hr) IV Continuous <Continuous>  dextrose 50% Injectable 12.5 Gram(s) IV Push once  dextrose 50% Injectable 25 Gram(s) IV Push once  dextrose 50% Injectable 25 Gram(s) IV Push once  diltiazem    milliGRAM(s) Oral daily  insulin glargine Injectable (LANTUS) 35 Unit(s) SubCutaneous every morning  insulin glargine Injectable (LANTUS) 35 Unit(s) SubCutaneous at bedtime  insulin lispro (HumaLOG) corrective regimen sliding scale   SubCutaneous three times a day before meals  insulin lispro (HumaLOG) corrective regimen sliding scale   SubCutaneous at bedtime  insulin lispro Injectable (HumaLOG) 12 Unit(s) SubCutaneous before lunch  insulin lispro Injectable (HumaLOG) 16 Unit(s) SubCutaneous before dinner  insulin lispro Injectable (HumaLOG) 17 Unit(s) SubCutaneous before breakfast  metolazone 2.5 milliGRAM(s) Oral daily  metoprolol succinate  milliGRAM(s) Oral daily  nystatin Powder 1 Application(s) Topical every 12 hours  pregabalin 100 milliGRAM(s) Oral two times a day  senna 2 Tablet(s) Oral at bedtime    MEDICATIONS  (PRN):  acetaminophen   Tablet .. 650 milliGRAM(s) Oral every 4 hours PRN Temp greater or equal to 38.5C (101.3F), Mild Pain (1 - 3)  dextrose 40% Gel 15 Gram(s) Oral once PRN Blood Glucose LESS THAN 70 milliGRAM(s)/deciliter  glucagon  Injectable 1 milliGRAM(s) IntraMuscular once PRN Glucose LESS THAN 70 milligrams/deciliter  oxyCODONE    IR 5 milliGRAM(s) Oral two times a day PRN Severe Pain (7 - 10)  sodium chloride 0.65% Nasal 1 Spray(s) Both Nostrils three times a day PRN Nasal Congestion    No Known Allergies    Review of Systems:  HEENT: No pain  Cardiovascular: No chest pain  Respiratory: No SOB  GI: No nausea, vomiting, abdominal pain    PHYSICAL EXAM:  VITALS: T(C): 36.3 (11-21-19 @ 07:45)  T(F): 97.3 (11-21-19 @ 07:45), Max: 97.8 (11-20-19 @ 19:46)  HR: 89 (11-21-19 @ 11:57) (83 - 102)  BP: 109/67 (11-21-19 @ 11:57) (109/67 - 157/68)  RR:  (16 - 18)  SpO2:  (94% - 100%)  Wt(kg): --  GENERAL: NAD  EYES: No proptosis, anicteric  HEENT:  Atraumatic, Normocephalic, moist mucous membranes  RESPIRATORY: unlabored respirations   PSYCH: Alert and oriented x 3, normal affect, normal mood    CAPILLARY BLOOD GLUCOSE    POCT Blood Glucose.: 152 mg/dL (21 Nov 2019 08:30)  POCT Blood Glucose.: 235 mg/dL (20 Nov 2019 21:39)  POCT Blood Glucose.: 168 mg/dL (20 Nov 2019 17:24)  POCT Blood Glucose.: 234 mg/dL (20 Nov 2019 12:45)      11-20    136  |  91<L>  |  59<H>  ----------------------------<  201<H>  3.7   |  27  |  1.61<H>    EGFR if : 52  EGFR if non : 45    Ca    9.0      11-20      Hemoglobin A1C, Whole Blood: 11.7 % <H> [4.0 - 5.6] (11-07-19 @ 09:14)  Hemoglobin A1C, Whole Blood: 10.3 % <H> [4.0 - 5.6] (10-17-19 @ 07:43)

## 2019-11-21 NOTE — DISCHARGE NOTE NURSING/CASE MANAGEMENT/SOCIAL WORK - PATIENT PORTAL LINK FT
You can access the FollowMyHealth Patient Portal offered by [facility] by registering at the following website: [email or web address]. By joining [employer]’s FollowMyHealth portal, you will also be able to view your health information using other applications (apps) compatible with our system.

## 2019-11-21 NOTE — DISCHARGE NOTE PROVIDER - NSDCFUSCHEDAPPT_GEN_ALL_CORE_FT
DYLAN DEL CASTILLO ; 01/03/2020 ; NPP [facility] [address] [facility] DYLAN DEL CASTILLO ; 01/03/2020 ; NPP [facility] [address] [facility] DYLAN DEL CASTILLO ; 01/03/2020 ; NPP Med End[facility] [address] [facility] DYLAN DEL CASTILLO ; 01/03/2020 ; NPP [facility] End[facility] [address] [facility] DYLAN DEL CASTILLO ; 01/03/2020 ; NPP Med Endocr [address] [facility]

## 2019-11-21 NOTE — DISCHARGE NOTE PROVIDER - HOSPITAL COURSE
HPI:    64 year old male with Hypertension, Hyperlipidemia, Atrial Fibrillation, s/p DCCV, Coronary Artery Disease, NSTEMI, (2/2014), s/p PCI to mid LAD, Proximal LCx, Distal LCx (2/14, [facility], LULU), Congestive Heart Failure, Normal Ejection Fraction, Diabetes Mellitus, CVA, Severe Left Internal Carotid Disease, s/p CEA (2/2014) presenting with worsening SOB, cough found to have right sided pleural effusion, hydropneumothorax s/p thoracentesis on 11/8/19 with bloody exudate; cytology was negative for maligant cells.  Right pleural pigtail catheter placed with serosanguinous drainage.  Pigtail removed on 11/15 and SQ Lovenox to start on 11/16/19 AM. Repeat CT Chest with small loculated right hydropneumothorax containing pigtail cath.  Thoracic surgery was consulted, plans for Right VATs with decortication. Patient with recent TTE with no evidence of pericardial effusion, mod LAE, grossly borderline LV sys dysfx EF 45% likely underestimated due to AF.  During hospital course, patient was acute gout flare, rheumatology consulted, multiple one time doses of colchicine given, patient refusing doses of steroids due to uncontrolled blood sugars.  Endocrine consulted for uncontrolled blood sugars, insulin doses were adjusted accordingly.  Patient stable for transfer to [facility] for Right VATs with decortication.     Patient transferred to [facility] on 11/17/19 (17 Nov 2019 16:42)    Repeat CT scan showed small effusion, pt not symptomatic. It was decided not to proceed with surgery

## 2019-11-21 NOTE — PROGRESS NOTE ADULT - SUBJECTIVE AND OBJECTIVE BOX
CHIEF COMPLAINT:Patient is a 64y old  Male who presents with a chief complaint of recurrent pleural effusion (21 Nov 2019 10:46)    	        PAST MEDICAL & SURGICAL HISTORY:  Neuropathy  CAD (coronary artery disease)  MI (myocardial infarction): circa 2014  Atrial fibrillation  TIA (transient ischemic attack)  DM type 2 (diabetes mellitus, type 2)  HLD (hyperlipidemia)  HTN (hypertension), benign  S/P carotid endarterectomy: left  Status post angioplasty with stent: LULU x 3 2/7/2014          REVIEW OF SYSTEMS:  CONSTITUTIONAL: No fever, weight loss, or fatigue  EYES: No eye pain, visual disturbances, or discharge  NECK: No pain or stiffness  RESPIRATORY: breathing improved overall  CARDIOVASCULAR: No chest pain, palpitations, passing out,   GASTROINTESTINAL: No abdominal or epigastric pain. No nausea, vomiting, or hematemesis; No diarrhea or constipation. No melena or hematochezia.  GENITOURINARY: No dysuria, frequency, hematuria, or incontinence  NEUROLOGICAL: No headaches, memory loss, loss of strength, numbness, or tremors  MUSCULOSKELETAL: No joint pain or swelling; No muscle, back, or extremity pain    Medications:  MEDICATIONS  (STANDING):  aspirin enteric coated 81 milliGRAM(s) Oral daily  buMETAnide 2 milliGRAM(s) Oral daily  dextrose 5%. 1000 milliLiter(s) (50 mL/Hr) IV Continuous <Continuous>  dextrose 50% Injectable 12.5 Gram(s) IV Push once  dextrose 50% Injectable 25 Gram(s) IV Push once  dextrose 50% Injectable 25 Gram(s) IV Push once  diltiazem    milliGRAM(s) Oral daily  insulin glargine Injectable (LANTUS) 35 Unit(s) SubCutaneous every morning  insulin glargine Injectable (LANTUS) 35 Unit(s) SubCutaneous at bedtime  insulin lispro (HumaLOG) corrective regimen sliding scale   SubCutaneous three times a day before meals  insulin lispro (HumaLOG) corrective regimen sliding scale   SubCutaneous at bedtime  insulin lispro Injectable (HumaLOG) 12 Unit(s) SubCutaneous before lunch  insulin lispro Injectable (HumaLOG) 16 Unit(s) SubCutaneous before dinner  insulin lispro Injectable (HumaLOG) 17 Unit(s) SubCutaneous before breakfast  insulin lispro Injectable (HumaLOG) 12 Unit(s) SubCutaneous before lunch  insulin lispro Injectable (HumaLOG) 16 Unit(s) SubCutaneous before dinner  insulin lispro Injectable (HumaLOG) 17 Unit(s) SubCutaneous before breakfast  metolazone 2.5 milliGRAM(s) Oral daily  metoprolol succinate  milliGRAM(s) Oral daily  nystatin Powder 1 Application(s) Topical every 12 hours  pregabalin 100 milliGRAM(s) Oral two times a day  senna 2 Tablet(s) Oral at bedtime    MEDICATIONS  (PRN):  acetaminophen   Tablet .. 650 milliGRAM(s) Oral every 4 hours PRN Temp greater or equal to 38.5C (101.3F), Mild Pain (1 - 3)  dextrose 40% Gel 15 Gram(s) Oral once PRN Blood Glucose LESS THAN 70 milliGRAM(s)/deciliter  glucagon  Injectable 1 milliGRAM(s) IntraMuscular once PRN Glucose LESS THAN 70 milligrams/deciliter  oxyCODONE    IR 5 milliGRAM(s) Oral two times a day PRN Severe Pain (7 - 10)  sodium chloride 0.65% Nasal 1 Spray(s) Both Nostrils three times a day PRN Nasal Congestion    	    PHYSICAL EXAM:  T(C): 36.3 (11-21-19 @ 07:45), Max: 36.6 (11-20-19 @ 19:46)  HR: 89 (11-21-19 @ 11:57) (83 - 89)  BP: 109/67 (11-21-19 @ 11:57) (109/67 - 157/68)  RR: 16 (11-21-19 @ 07:45) (16 - 16)  SpO2: 100% (11-21-19 @ 07:45) (96% - 100%)  Wt(kg): --  I&O's Summary    20 Nov 2019 07:01  -  21 Nov 2019 07:00  --------------------------------------------------------  IN: 276 mL / OUT: 2200 mL / NET: -1924 mL    21 Nov 2019 07:01  -  21 Nov 2019 13:59  --------------------------------------------------------  IN: 46 mL / OUT: 800 mL / NET: -754 mL        Appearance: Normal	  HEENT:   Normal oral mucosa, PERRL, EOMI	  Lymphatic: No lymphadenopathy  Cardiovascular: Normal S1 S2, No JVD, No murmurs, No edema  Respiratory: dec bs  Psychiatry: A & O x 3, Mood & affect appropriate  Gastrointestinal:  Soft, Non-tender, + BS	  Skin: No rashes, No ecchymoses, No cyanosis	  Neurologic: Non-focal  Extremities: Normal range of motion, No clubbing,   pvd  Vascular: Peripheral pulses palpable   TELEMETRY: 	    ECG:  	  RADIOLOGY:  OTHER: 	  	  LABS:	 	    CARDIAC MARKERS:                                15.7   8.64  )-----------( 280      ( 20 Nov 2019 06:00 )             52.3     11-20    136  |  91<L>  |  59<H>  ----------------------------<  201<H>  3.7   |  27  |  1.61<H>    Ca    9.0      20 Nov 2019 06:00      proBNP:   Lipid Profile:   HgA1c:   TSH:

## 2019-11-21 NOTE — PROGRESS NOTE ADULT - PROBLEM SELECTOR PLAN 1
-BG target 100-180 mg/dl; patient within target thus far, plan for discharge today  While inpatient:  -Please continue to check BG before meals and bedtime   -Continue Lantus 35 units SQ BID   -Continue Humalog 17 units before breakfast; Humalog 12 units pre-lunch and Humalog 16 units pre-dinner.   -Continue with Humalog low dose correctional scale before meals and low dose scale at bedtime   -Consistent carbohydrate diet; recommend avoiding in between meal snacking    Discharge Plan:  -Plan for discharge to resume basal/bolus insulin pens. Can discharge on current doses as ordered, Humalog 17/12/16, Lantus 35 units BID. No sliding scale for discharge.   -Reviewed with patient that does may need to be titrated as outpatient, currently doses are significantly greater than previous home doses (home doses were Lantus 30 qHS and Humalog 8-10 before meals; however with uncontrolled HbA1c greater than 11%).   -Patient instructed regarding hypoglycemia and hyperglycemia recognition and treatment, notified to call endocrine or PMD if experiencing hypoglycemia or persistent hyperglycemia after discharge, patient verbalizes understanding.   -Patient should see opthalmology and PCP after discharge and yearly  -As previously stated, recommend decreasing overall pasta intake within patient's diet and following consistent carbohydrate diet at home.  -Endocrine follow up with Dr. Real, Friday January 3, 2020 at 2:15 PM, [address] [facility], [address], [facility] [address]    Reviewed discharge plan with primary team NP and primary RN

## 2019-12-07 LAB
CULTURE RESULTS: SIGNIFICANT CHANGE UP
SPECIMEN SOURCE: SIGNIFICANT CHANGE UP

## 2020-01-03 ENCOUNTER — APPOINTMENT (OUTPATIENT)
Dept: ENDOCRINOLOGY | Facility: CLINIC | Age: 65
End: 2020-01-03

## 2020-02-22 NOTE — ED PROVIDER NOTE - DISPOSITION TYPE
1011: Dr. Brothers called unit to discuss pt labs. Pt to stay overnight for BP monitoring. Pt okay to receive 1 tab of fiorcet q6h for headache. BP to be taken q4h. Pt to remain on continuous monitoring. Pt updated on POC and transferred to room 357. Will continue to monitor.    ADMIT

## 2020-03-01 NOTE — ED PROVIDER NOTE - RESPIRATORY NEGATIVE STATEMENT, MLM
History     Chief Complaint:  Fall    The history is provided by the EMS personnel and a relative.      Joelle Freeman is a 89 year old female who presents with fall. The patient's family states that the patient was in her wheelchair hen her purse fell. The patient reached for the purse and fell forward hitting her head. The patient sustained a laceration to her forehead. They deny any loss of consciousness or any other injuries. The patient is not on any blood thinners. The patient's tetanus is not up to date.     Allergies:  Tramadol      Medications:    Baby aspirin   Lipitor  Lexapro  Levothyroxine   Lisinopril   Metoprolol  Mycostatin   Senna     Past Medical History:    Asthma  CAD  CVA  Dementia  Depression   Diverticulosis  Hypertension  Hypothyroidism   Posterior reversible encephalopathy syndrome  Subarachnoid hemorrhage  Subdural hemorrhage  Subdural hematoma  TIA   Cerebral artery occulusion   Idiopathic normal pressure hydrocephalus  Severe aortic stenosis  Stroke   A fib   DVT  generalized anxiety disorder  alcohol abuse     Past Surgical History:    appendectomy  Breast biopsy  Cardiac stent  Cholecystectomy   Tonsillectomy and adenoidectomy   Bladder suspension   D&C  Hernia repair   Oophorectomy   Hysterectomy  ORIF  Optical tracking system implant shunt ventriculoperitoneal     Family History:    Cerebrovascular disease- mother, father  Blood disorder- son   CHF- mother, father   RA- son     Social History:  The patient was accompanied to the ED by son and daughter in law.  Smoking Status: Never Smoker  Smokeless Tobacco: Never Used  Alcohol Use: No   Marital Status:       Review of Systems   Skin: Positive for wound.   Neurological: Negative for syncope.   All other systems reviewed and are negative.        Physical Exam     Patient Vitals for the past 24 hrs:   BP Temp Temp src Pulse Resp SpO2   02/29/20 1900 (!) 155/102 -- -- 70 -- 95 %   02/29/20 1843 (!) 163/84 97.9  F (36.6  C) Oral  72 18 94 %       Physical Exam  Vitals signs reviewed.   Constitutional:       Comments: Frail-appearing   HENT:      Head: Normocephalic.      Comments: There is a midline forehead laceration that measures about 2.5 cm that is vertical in a slight Y-shaped.  There is active oozing and no foreign body noted.     Nose:      Comments: There is swelling over the nasal bridge.  There is ecchymosis bilaterally.  There is no septal hematoma.  No active nasal bleeding.     Mouth/Throat:      Mouth: Mucous membranes are moist.   Cardiovascular:      Rate and Rhythm: Normal rate.   Pulmonary:      Effort: Pulmonary effort is normal.   Abdominal:      General: Abdomen is flat.      Palpations: Abdomen is soft.   Musculoskeletal: Normal range of motion.   Skin:     General: Skin is warm.      Capillary Refill: Capillary refill takes less than 2 seconds.   Neurological:      General: No focal deficit present.      Mental Status: She is alert.   Psychiatric:         Mood and Affect: Mood normal.           Emergency Department Course     Imaging:  Radiology findings were communicated with the patient who voiced understanding of the findings.    Head CT w/o contrast:  1.  No acute intracranial injury, hemorrhage, mass, or definite CT  evidence of recent ischemia.  2.  Stable chronic changes as above.  Reading per radiology    Cervical spine CT:  No fracture or definite acute injury in the cervical  Spine.  Reading per radiology    CT facial bones without contrast:  1. Slightly comminuted and angulated/displaced left nasal arch  fracture may be acute. Correlate for focal pain/swelling.  2. Small midline frontal scalp hematoma without underlying frontal  bone fracture.   Reading per radiology    Procedures    Laceration Repair        LACERATION:  A simple clean 2.5 cm laceration.      LOCATION:  forehead      FUNCTION:  Distally sensation and circulation are intact.      ANESTHESIA:  LET      PREPARATION:  Irrigation with Shur  Anabella      DEBRIDEMENT:  no debridement      CLOSURE:  Wound was closed with One Layer.  Skin closed with 5 x 5.0 Ethylon using interrupted sutures.    Emergency Department Course:     Nursing notes and vitals reviewed.    1843 I performed an exam of the patient as documented above.     1917 The patient was sent for a CT while in the emergency department, results above.     1932 I prepped the patient for laceration repair.     1946 I repaired the patient's laceration, see procedure note above.    2100 I returned to check on patient.  I personally reviewed the imaging results with the patient and answered all related questions prior to discharge.    Impression & Plan      Medical Decision Making:  Joelle Freeman is a 89 year old female who presents to the emergency department today for evaluation of head and facial injury.  Family C seem quite anxious.  And are obviously saddened by the fact that she fell out of the wheelchair.  Patient is minimally tender in the over the C-spine but due to age of 89 imaging of the face head and neck were performed and negative.  No other signs of injury.  Facial wound was repaired by me and patient will be discharged for suture and follow-up.  Patient was discharged in improved condition no other concern for injury noted.    Diagnosis:    ICD-10-CM    1. Forehead laceration, initial encounter S01.81XA    2. Closed fracture of nasal bone, initial encounter S02.2XXA    3. Fall from wheelchair, initial encounter W05.0XXA      Disposition:   Findings and plan explained to the Patient and son and daughter. Patient discharged home with instructions regarding supportive care, medications, and reasons to return. The importance of close follow-up was reviewed.     Scribe Disclosure:  I, Candace Grimaldo, am serving as a scribe at 7:23 PM on 2/29/2020 to document services personally performed by Dmitriy Garibay MD based on my observations and the provider's statements to me.    [facility]  EMERGENCY DEPARTMENT       Dmitriy Garibay MD  03/03/20 8724     no chest pain, no cough, and no shortness of breath.

## 2020-07-28 ENCOUNTER — INPATIENT (INPATIENT)
Facility: HOSPITAL | Age: 65
LOS: 10 days | Discharge: HOME HEALTH | DRG: 299 | End: 2020-08-08
Attending: INTERNAL MEDICINE | Admitting: INTERNAL MEDICINE
Payer: COMMERCIAL

## 2020-07-28 VITALS
WEIGHT: 287.92 LBS | DIASTOLIC BLOOD PRESSURE: 85 MMHG | RESPIRATION RATE: 18 BRPM | HEART RATE: 98 BPM | TEMPERATURE: 98 F | OXYGEN SATURATION: 96 % | SYSTOLIC BLOOD PRESSURE: 150 MMHG | HEIGHT: 71 IN

## 2020-07-28 DIAGNOSIS — I50.22 CHRONIC SYSTOLIC (CONGESTIVE) HEART FAILURE: ICD-10-CM

## 2020-07-28 DIAGNOSIS — E11.42 TYPE 2 DIABETES MELLITUS WITH DIABETIC POLYNEUROPATHY: ICD-10-CM

## 2020-07-28 DIAGNOSIS — R09.89 OTHER SPECIFIED SYMPTOMS AND SIGNS INVOLVING THE CIRCULATORY AND RESPIRATORY SYSTEMS: ICD-10-CM

## 2020-07-28 DIAGNOSIS — L03.115 CELLULITIS OF RIGHT LOWER LIMB: ICD-10-CM

## 2020-07-28 DIAGNOSIS — L03.90 CELLULITIS, UNSPECIFIED: ICD-10-CM

## 2020-07-28 DIAGNOSIS — Z98.89 OTHER SPECIFIED POSTPROCEDURAL STATES: Chronic | ICD-10-CM

## 2020-07-28 DIAGNOSIS — N17.9 ACUTE KIDNEY FAILURE, UNSPECIFIED: ICD-10-CM

## 2020-07-28 DIAGNOSIS — I25.10 ATHEROSCLEROTIC HEART DISEASE OF NATIVE CORONARY ARTERY WITHOUT ANGINA PECTORIS: ICD-10-CM

## 2020-07-28 DIAGNOSIS — I48.11 LONGSTANDING PERSISTENT ATRIAL FIBRILLATION: ICD-10-CM

## 2020-07-28 DIAGNOSIS — K59.00 CONSTIPATION, UNSPECIFIED: ICD-10-CM

## 2020-07-28 DIAGNOSIS — Z95.5 PRESENCE OF CORONARY ANGIOPLASTY IMPLANT AND GRAFT: Chronic | ICD-10-CM

## 2020-07-28 DIAGNOSIS — I10 ESSENTIAL (PRIMARY) HYPERTENSION: ICD-10-CM

## 2020-07-28 LAB
ALBUMIN SERPL ELPH-MCNC: 3.8 G/DL — SIGNIFICANT CHANGE UP (ref 3.3–5)
ALP SERPL-CCNC: 92 U/L — SIGNIFICANT CHANGE UP (ref 40–120)
ALT FLD-CCNC: 5 U/L — LOW (ref 10–45)
ANION GAP SERPL CALC-SCNC: 17 MMOL/L — SIGNIFICANT CHANGE UP (ref 5–17)
AST SERPL-CCNC: 19 U/L — SIGNIFICANT CHANGE UP (ref 10–40)
BASE EXCESS BLDV CALC-SCNC: 6.2 MMOL/L — HIGH (ref -2–2)
BASOPHILS # BLD AUTO: 0.04 K/UL — SIGNIFICANT CHANGE UP (ref 0–0.2)
BASOPHILS NFR BLD AUTO: 0.4 % — SIGNIFICANT CHANGE UP (ref 0–2)
BILIRUB SERPL-MCNC: 0.3 MG/DL — SIGNIFICANT CHANGE UP (ref 0.2–1.2)
BUN SERPL-MCNC: 51 MG/DL — HIGH (ref 7–23)
CA-I SERPL-SCNC: 1.16 MMOL/L — SIGNIFICANT CHANGE UP (ref 1.12–1.3)
CALCIUM SERPL-MCNC: 10 MG/DL — SIGNIFICANT CHANGE UP (ref 8.4–10.5)
CHLORIDE BLDV-SCNC: 97 MMOL/L — SIGNIFICANT CHANGE UP (ref 96–108)
CHLORIDE SERPL-SCNC: 93 MMOL/L — LOW (ref 96–108)
CO2 BLDV-SCNC: 34 MMOL/L — HIGH (ref 22–30)
CO2 SERPL-SCNC: 27 MMOL/L — SIGNIFICANT CHANGE UP (ref 22–31)
CREAT SERPL-MCNC: 2.73 MG/DL — HIGH (ref 0.5–1.3)
EOSINOPHIL # BLD AUTO: 0.09 K/UL — SIGNIFICANT CHANGE UP (ref 0–0.5)
EOSINOPHIL NFR BLD AUTO: 0.9 % — SIGNIFICANT CHANGE UP (ref 0–6)
GAS PNL BLDV: 135 MMOL/L — SIGNIFICANT CHANGE UP (ref 135–145)
GAS PNL BLDV: SIGNIFICANT CHANGE UP
GAS PNL BLDV: SIGNIFICANT CHANGE UP
GLUCOSE BLDC GLUCOMTR-MCNC: 106 MG/DL — HIGH (ref 70–99)
GLUCOSE BLDV-MCNC: 152 MG/DL — HIGH (ref 70–99)
GLUCOSE SERPL-MCNC: 151 MG/DL — HIGH (ref 70–99)
HCO3 BLDV-SCNC: 32 MMOL/L — HIGH (ref 21–29)
HCT VFR BLD CALC: 45.9 % — SIGNIFICANT CHANGE UP (ref 39–50)
HCT VFR BLDA CALC: 44 % — SIGNIFICANT CHANGE UP (ref 39–50)
HGB BLD CALC-MCNC: 14.3 G/DL — SIGNIFICANT CHANGE UP (ref 13–17)
HGB BLD-MCNC: 13.7 G/DL — SIGNIFICANT CHANGE UP (ref 13–17)
IMM GRANULOCYTES NFR BLD AUTO: 0.7 % — SIGNIFICANT CHANGE UP (ref 0–1.5)
LACTATE BLDV-MCNC: 1.6 MMOL/L — SIGNIFICANT CHANGE UP (ref 0.7–2)
LYMPHOCYTES # BLD AUTO: 2.04 K/UL — SIGNIFICANT CHANGE UP (ref 1–3.3)
LYMPHOCYTES # BLD AUTO: 20.7 % — SIGNIFICANT CHANGE UP (ref 13–44)
MCHC RBC-ENTMCNC: 24.1 PG — LOW (ref 27–34)
MCHC RBC-ENTMCNC: 29.8 GM/DL — LOW (ref 32–36)
MCV RBC AUTO: 80.7 FL — SIGNIFICANT CHANGE UP (ref 80–100)
MONOCYTES # BLD AUTO: 0.85 K/UL — SIGNIFICANT CHANGE UP (ref 0–0.9)
MONOCYTES NFR BLD AUTO: 8.6 % — SIGNIFICANT CHANGE UP (ref 2–14)
NEUTROPHILS # BLD AUTO: 6.78 K/UL — SIGNIFICANT CHANGE UP (ref 1.8–7.4)
NEUTROPHILS NFR BLD AUTO: 68.7 % — SIGNIFICANT CHANGE UP (ref 43–77)
NRBC # BLD: 0 /100 WBCS — SIGNIFICANT CHANGE UP (ref 0–0)
PCO2 BLDV: 56 MMHG — HIGH (ref 35–50)
PH BLDV: 7.39 — SIGNIFICANT CHANGE UP (ref 7.35–7.45)
PLATELET # BLD AUTO: 239 K/UL — SIGNIFICANT CHANGE UP (ref 150–400)
PO2 BLDV: 26 MMHG — SIGNIFICANT CHANGE UP (ref 25–45)
POTASSIUM BLDV-SCNC: 4.8 MMOL/L — SIGNIFICANT CHANGE UP (ref 3.5–5.3)
POTASSIUM SERPL-MCNC: 4.9 MMOL/L — SIGNIFICANT CHANGE UP (ref 3.5–5.3)
POTASSIUM SERPL-SCNC: 4.9 MMOL/L — SIGNIFICANT CHANGE UP (ref 3.5–5.3)
PROT SERPL-MCNC: 8.2 G/DL — SIGNIFICANT CHANGE UP (ref 6–8.3)
RBC # BLD: 5.69 M/UL — SIGNIFICANT CHANGE UP (ref 4.2–5.8)
RBC # FLD: 17.2 % — HIGH (ref 10.3–14.5)
SAO2 % BLDV: 37 % — LOW (ref 67–88)
SARS-COV-2 RNA SPEC QL NAA+PROBE: SIGNIFICANT CHANGE UP
SODIUM SERPL-SCNC: 137 MMOL/L — SIGNIFICANT CHANGE UP (ref 135–145)
WBC # BLD: 9.87 K/UL — SIGNIFICANT CHANGE UP (ref 3.8–10.5)
WBC # FLD AUTO: 9.87 K/UL — SIGNIFICANT CHANGE UP (ref 3.8–10.5)

## 2020-07-28 PROCEDURE — 99223 1ST HOSP IP/OBS HIGH 75: CPT

## 2020-07-28 PROCEDURE — 99285 EMERGENCY DEPT VISIT HI MDM: CPT

## 2020-07-28 RX ORDER — DABIGATRAN ETEXILATE MESYLATE 150 MG/1
1 CAPSULE ORAL
Qty: 0 | Refills: 0 | DISCHARGE

## 2020-07-28 RX ORDER — DEXTROSE 50 % IN WATER 50 %
15 SYRINGE (ML) INTRAVENOUS ONCE
Refills: 0 | Status: DISCONTINUED | OUTPATIENT
Start: 2020-07-28 | End: 2020-08-08

## 2020-07-28 RX ORDER — SENNA PLUS 8.6 MG/1
2 TABLET ORAL AT BEDTIME
Refills: 0 | Status: DISCONTINUED | OUTPATIENT
Start: 2020-07-28 | End: 2020-08-08

## 2020-07-28 RX ORDER — INSULIN LISPRO 100/ML
12 VIAL (ML) SUBCUTANEOUS
Refills: 0 | Status: DISCONTINUED | OUTPATIENT
Start: 2020-07-28 | End: 2020-08-08

## 2020-07-28 RX ORDER — SODIUM CHLORIDE 9 MG/ML
1000 INJECTION INTRAMUSCULAR; INTRAVENOUS; SUBCUTANEOUS ONCE
Refills: 0 | Status: COMPLETED | OUTPATIENT
Start: 2020-07-28 | End: 2020-07-28

## 2020-07-28 RX ORDER — VANCOMYCIN HCL 1 G
1000 VIAL (EA) INTRAVENOUS EVERY 24 HOURS
Refills: 0 | Status: DISCONTINUED | OUTPATIENT
Start: 2020-07-28 | End: 2020-07-29

## 2020-07-28 RX ORDER — DEXTROSE 50 % IN WATER 50 %
12.5 SYRINGE (ML) INTRAVENOUS ONCE
Refills: 0 | Status: DISCONTINUED | OUTPATIENT
Start: 2020-07-28 | End: 2020-08-08

## 2020-07-28 RX ORDER — ASPIRIN/CALCIUM CARB/MAGNESIUM 324 MG
81 TABLET ORAL DAILY
Refills: 0 | Status: DISCONTINUED | OUTPATIENT
Start: 2020-07-28 | End: 2020-08-08

## 2020-07-28 RX ORDER — INSULIN GLARGINE 100 [IU]/ML
25 INJECTION, SOLUTION SUBCUTANEOUS AT BEDTIME
Refills: 0 | Status: DISCONTINUED | OUTPATIENT
Start: 2020-07-28 | End: 2020-08-05

## 2020-07-28 RX ORDER — DEXTROSE 50 % IN WATER 50 %
25 SYRINGE (ML) INTRAVENOUS ONCE
Refills: 0 | Status: DISCONTINUED | OUTPATIENT
Start: 2020-07-28 | End: 2020-08-08

## 2020-07-28 RX ORDER — ACETAMINOPHEN 500 MG
650 TABLET ORAL EVERY 6 HOURS
Refills: 0 | Status: DISCONTINUED | OUTPATIENT
Start: 2020-07-28 | End: 2020-08-08

## 2020-07-28 RX ORDER — GLUCAGON INJECTION, SOLUTION 0.5 MG/.1ML
1 INJECTION, SOLUTION SUBCUTANEOUS ONCE
Refills: 0 | Status: DISCONTINUED | OUTPATIENT
Start: 2020-07-28 | End: 2020-08-08

## 2020-07-28 RX ORDER — METOPROLOL TARTRATE 50 MG
50 TABLET ORAL
Refills: 0 | Status: DISCONTINUED | OUTPATIENT
Start: 2020-07-28 | End: 2020-08-08

## 2020-07-28 RX ORDER — OXYCODONE HYDROCHLORIDE 5 MG/1
5 TABLET ORAL EVERY 6 HOURS
Refills: 0 | Status: DISCONTINUED | OUTPATIENT
Start: 2020-07-28 | End: 2020-08-04

## 2020-07-28 RX ORDER — INSULIN LISPRO 100/ML
VIAL (ML) SUBCUTANEOUS AT BEDTIME
Refills: 0 | Status: DISCONTINUED | OUTPATIENT
Start: 2020-07-28 | End: 2020-08-05

## 2020-07-28 RX ORDER — PIPERACILLIN AND TAZOBACTAM 4; .5 G/20ML; G/20ML
3.38 INJECTION, POWDER, LYOPHILIZED, FOR SOLUTION INTRAVENOUS EVERY 8 HOURS
Refills: 0 | Status: DISCONTINUED | OUTPATIENT
Start: 2020-07-28 | End: 2020-08-04

## 2020-07-28 RX ORDER — BUMETANIDE 0.25 MG/ML
2 INJECTION INTRAMUSCULAR; INTRAVENOUS DAILY
Refills: 0 | Status: DISCONTINUED | OUTPATIENT
Start: 2020-07-28 | End: 2020-08-02

## 2020-07-28 RX ORDER — INSULIN LISPRO 100/ML
14 VIAL (ML) SUBCUTANEOUS
Refills: 0 | Status: DISCONTINUED | OUTPATIENT
Start: 2020-07-28 | End: 2020-08-08

## 2020-07-28 RX ORDER — DILTIAZEM HCL 120 MG
120 CAPSULE, EXT RELEASE 24 HR ORAL DAILY
Refills: 0 | Status: DISCONTINUED | OUTPATIENT
Start: 2020-07-28 | End: 2020-08-08

## 2020-07-28 RX ORDER — INSULIN LISPRO 100/ML
16 VIAL (ML) SUBCUTANEOUS
Refills: 0 | Status: DISCONTINUED | OUTPATIENT
Start: 2020-07-28 | End: 2020-08-08

## 2020-07-28 RX ORDER — POLYETHYLENE GLYCOL 3350 17 G/17G
17 POWDER, FOR SOLUTION ORAL DAILY
Refills: 0 | Status: DISCONTINUED | OUTPATIENT
Start: 2020-07-28 | End: 2020-08-08

## 2020-07-28 RX ORDER — SODIUM CHLORIDE 9 MG/ML
1000 INJECTION, SOLUTION INTRAVENOUS
Refills: 0 | Status: DISCONTINUED | OUTPATIENT
Start: 2020-07-28 | End: 2020-08-08

## 2020-07-28 RX ORDER — INSULIN LISPRO 100/ML
VIAL (ML) SUBCUTANEOUS
Refills: 0 | Status: DISCONTINUED | OUTPATIENT
Start: 2020-07-28 | End: 2020-08-05

## 2020-07-28 RX ORDER — INSULIN GLARGINE 100 [IU]/ML
35 INJECTION, SOLUTION SUBCUTANEOUS
Qty: 0 | Refills: 0 | DISCHARGE

## 2020-07-28 RX ORDER — DABIGATRAN ETEXILATE MESYLATE 150 MG/1
150 CAPSULE ORAL
Refills: 0 | Status: DISCONTINUED | OUTPATIENT
Start: 2020-07-28 | End: 2020-08-06

## 2020-07-28 RX ADMIN — Medication 1 ENEMA: at 20:23

## 2020-07-28 RX ADMIN — Medication 100 MILLIGRAM(S): at 20:24

## 2020-07-28 RX ADMIN — OXYCODONE HYDROCHLORIDE 5 MILLIGRAM(S): 5 TABLET ORAL at 23:45

## 2020-07-28 RX ADMIN — SODIUM CHLORIDE 1000 MILLILITER(S): 9 INJECTION INTRAMUSCULAR; INTRAVENOUS; SUBCUTANEOUS at 22:39

## 2020-07-28 NOTE — H&P ADULT - PROBLEM SELECTOR PLAN 8
MI/CAD s/p stents, last ~2014. No CP, EKG without ischemic changes   c/w ASA   unclear why pt is not on statin - would f/u with PMD

## 2020-07-28 NOTE — H&P ADULT - PROBLEM SELECTOR PLAN 2
pt with ANT vs CKD - unclear what recent Cr is, but per EMR last Cr ~1.6 in 2019  possibly ANT in setting of infection, medication effects   will check urine lytes   obtain collateral from PMD regarding baseline Cr  monitor Cr, avoid nephrotoxins, renally dose medications

## 2020-07-28 NOTE — H&P ADULT - PROBLEM SELECTOR PLAN 1
pt with RLE blisters with erythema, warmth and tenderness in setting chronic venous stasis and diabetes, without improvement on PO bactrim as outpt   treat with vanco and zosyn for now for MRSA and pseudomonal coverage, monitor Cr and check vanco levels prior to 3rd dose   wound care consult in AM  f/u culture data   consider vascular consult if wound not healing well  tylenol, oxycodone prn for pain control

## 2020-07-28 NOTE — H&P ADULT - NSHPREVIEWOFSYSTEMS_GEN_ALL_CORE
REVIEW OF SYSTEMS:    CONSTITUTIONAL: No weakness, fevers, chills  EYES/ENT: No visual changes;  no throat pain   NECK: No pain or stiffness  RESPIRATORY: No cough, no shortness of breath  CARDIOVASCULAR: No chest pain or palpitations  GASTROINTESTINAL: no nausea, vomiting, no abdominal pain, +constipation   GENITOURINARY: no hematuria, no dysuria  NEUROLOGICAL: no numbness, no headaches, no confusion   MUSCULOSKELETAL: no back pain, no focal weakness   SKIN: as per HPI  PSYCH: no anxiety, depression  HEME: no gum bleeding, no bruising

## 2020-07-28 NOTE — ED PROVIDER NOTE - OBJECTIVE STATEMENT
Meghann Juarez (DO): 65y M with PMHx of IDDM with polyneuropathy B/L, HLD, CVA in 2014 p/w 2 weeks of worsening right leg pain with formation of blisters. Went to UC last Thursday, told he likely has RLE infection, started on Bactrim x 5d, but with worsening symptoms. Ambulates with a cane on a daily basis. On Oxycodone for 2 weeks for neuropathic pain. + Constipation, tried Colace this morning. Denies F/C, cough, N/V, melena. Meghann Juarez (DO): 65y M with PMHx of IDDM with polyneuropathy B/L, HLD, TIA in 2014, Afib (on  p/w 2 weeks of worsening right leg pain with formation of blisters. Went to UC last Thursday, told he likely has RLE infection, started on Bactrim x 5d, but with worsening symptoms. Ambulates with a cane on a daily basis. On Oxycodone for 2 weeks for neuropathic pain. + Constipation, tried Colace this morning. Denies F/C, cough, N/V, melena. Meghann Juarez (DO): 65y M with PMHx of IDDM with polyneuropathy B/L, HLD, TIA in 2014, Afib, CAD s/p stents, p/w 2 weeks of worsening right leg pain, redness with formation of blisters. Went to  last Thursday, told he likely has RLE infection, started on Bactrim x 5d, but with worsening leg pain and redness today. No fevers, recent trauma or n/v/d. Called urgent care Ambulates with a cane on a daily basis. On Oxycodone for 2 weeks for neuropathic pain. + Constipation, tried Colace this morning with little relief. Denies F/C, cough, N/V, melena.

## 2020-07-28 NOTE — H&P ADULT - ASSESSMENT
65M with PMH of CAD/MI s/p stents (~2014), T2DM on insulin c/b neuropathy, TIA, HTN, Afib on pradaxa, HFrEF (EF 40-45%) p/w RLE erythema and blisters, a/w cellulitis that has failed output abx therapy.

## 2020-07-28 NOTE — H&P ADULT - PROBLEM SELECTOR PLAN 6
check A1c  c/w home regimen of insulin - lantus 25units qhs and lispro 12/14/16 TID with meals  ISS and monitor FS ac and hs  c/w lyrica for neuropathy

## 2020-07-28 NOTE — ED PROVIDER NOTE - SKIN WOUND DESCRIPTION
right shin: 3 blisters, intact but deflated. anterior right shin: blister with pus. erythema in B/L LE, R > L. right shin: 3 blisters, intact but deflated. anterior right shin: blister with pus. erythema in B/L LE, R > L, extending up 3/4 of right leg.

## 2020-07-28 NOTE — ED ADULT NURSE NOTE - OBJECTIVE STATEMENT
65y y.o. male with PMH DM, neuropathy, HLD, TIA, Afib, CAD presents to ED c/o R lower leg pain x2 weeks. Pt reports associated redness and blistering that has worsened over the 2 weeks. Pt reports he went to urgent care last week and was told "I have a leg infection" and was started on antibiotics. Pt reports he came today due to worsening pain and redness. Pt reports the blisters have started "popping and oozing." Pt reports he takes oxycodone for neuropathy has been constipated x1 week. Pt reports he ambulates at home with cane. Pt denies fevers, chills, cough, N/V/D, CP, SOB, abdominal pain, numbness, trauma to R leg/falling/banging leg, recent wounds on R leg. Upon assessment, pt A&Ox3, NAD, no increased WOB noted, R lower leg red and swollen with blisters noted, hot to touch, pt able to wiggle toes, bilateral positive pedal pulses noted. Pt safety and comfort provided.

## 2020-07-28 NOTE — H&P ADULT - HISTORY OF PRESENT ILLNESS
65M with PMH of CAD/MI s/p stents (~2014), T2DM on insulin c/b neuropathy, TIA, HTN, Afib on pradaxa, HFrEF (EF 40-45%) p/w RLE erythema and blisters. Pt states he initially noted a small blister on the medial aspect of his RLE shin 3 weeks ago, without any associated symptoms. Shortly after that blister popped, pt developed 2 much large fluid filled blisters on the anterior shin of RLE, with associated redness, warmth, mild swelling and pain. Pain is along with blisters, 7/10, sharp, worse with palpation, not associated with ambulation/weight bearing; no associated fevers or chills. Pt went to  on 7/23 and was diagnosed with cellulitis and started on bactrim which he has taken for past 4 days, without any improvement in symptoms and now noticing one of the blisters with yellow material within it. Pt has known peripheral vascular disease and neuropathy in b/l feet with significantly decreased sensation and pain; has never has cellulitis in the past but has had LE wounds in the past.

## 2020-07-28 NOTE — ED ADULT NURSE REASSESSMENT NOTE - NS ED NURSE REASSESS COMMENT FT1
Pt educated on waiting 10-15 minutes after enema administration. Pt reported he went "quite a bit." Pending dispo.

## 2020-07-28 NOTE — ED ADULT NURSE NOTE - CAS TRG GEN SKIN COLOR
LUNG TRANSPLANT PRE FOLLOW-UP    Referring Physician: Ivonne Rai    Reason for Visit:  Pre-lung transplant follow-up.         Date of Initial Evaluation: 1/16/2017                                                                                             History of Present Illness: Yuni Perez is a 49 y.o. female who is on 0L of oxygen. She is on no assisted ventilation.  Her New [place] Heart Association Class is III and a Karnofsky score of 70% - Cares for self: Unable to carry on normal activity or active work. She is not diabetic.   She presents today for follow up eval for Lung Transplant. Last seen in May 2019. She follows with Dr. Rai in the [facility] clinic. She is currently on Letairis, Adempas, and Remodulin at 80ng. She feels that her breathing with exertion has worsened over the last few months. She felt like her breathing was better on the adcirca. She feels tired all the time. She is sleeping all day. Side effects have improved on the adempas.       Review of Systems   Constitutional: Negative for chills, fever and weight loss.   HENT: Positive for congestion. Negative for nosebleeds and sinus pain.    Eyes: Negative for blurred vision, double vision and photophobia.   Respiratory: Negative for cough, hemoptysis, sputum production, shortness of breath and wheezing.    Cardiovascular: Negative for chest pain, orthopnea, claudication, leg swelling and PND.   Gastrointestinal: Negative for abdominal pain, constipation and diarrhea.   Genitourinary: Negative for flank pain and frequency.   Musculoskeletal: Negative for joint pain, myalgias and neck pain.   Skin: Negative for itching and rash.   Neurological: Negative for dizziness, focal weakness, weakness and headaches.   Endo/Heme/Allergies: Positive for environmental allergies.   Psychiatric/Behavioral: Negative for depression. The patient is nervous/anxious.      Objective:   /77   Pulse 96   Temp 98 °F (36.7 °C)   Resp 20   Ht  "4' 11" (1.499 m)   Wt 59.4 kg (131 lb)   SpO2 95% Comment: room air  BMI 26.46 kg/m²     Physical Exam   Constitutional: She is oriented to person, place, and time and well-developed, well-nourished, and in no distress. No distress.   HENT:   Head: Normocephalic and atraumatic.   Right Ear: External ear normal.   Left Ear: External ear normal.   Mouth/Throat: No oropharyngeal exudate.   Eyes: Conjunctivae and EOM are normal. No scleral icterus.   Neck: Normal range of motion. Neck supple. No JVD present.   Cardiovascular: Normal rate, regular rhythm and intact distal pulses. Exam reveals no gallop and no friction rub.   Murmur heard.  Pulmonary/Chest: Effort normal and breath sounds normal. No respiratory distress. She has no wheezes. She exhibits no tenderness.   Abdominal: Soft. Bowel sounds are normal. She exhibits no distension and no mass. There is no tenderness. There is no guarding.   Musculoskeletal: Normal range of motion. She exhibits no edema, tenderness or deformity.   Lymphadenopathy:     She has no cervical adenopathy.   Neurological: She is alert and oriented to person, place, and time. Gait normal. GCS score is 15.   Skin: Skin is warm and dry. No rash noted. She is not diaphoretic. No erythema. No pallor.   Psychiatric: Mood and affect normal.     Labs:  Hospital Outpatient Visit on 12/03/2019   Component Date Value    Pre FVC 12/03/2019 1.84*    PRE FEV5 12/03/2019 0.94     Pre FEV1 12/03/2019 1.22*    Pre FEV1 FVC 12/03/2019 66.45*    Pre FEF 25 75 12/03/2019 0.70*    Pre PEF 12/03/2019 4.04*    Pre  12/03/2019 6.17     Pre DLCO 12/03/2019 12.81*    DLCO ADJ PRE 12/03/2019 12.37*    DLCOVA PRE 12/03/2019 3.81     DLVAAdj PRE 12/03/2019 3.68     VA PRE 12/03/2019 3.37*    IVC PRE 12/03/2019 1.78*    FVC Ref 12/03/2019 2.91     FVC LLN 12/03/2019 2.28     FVC Pre Ref 12/03/2019 63.4     FEV05 REF 12/03/2019 1.77     FEV05 LLN 12/03/2019 0.92     PRE FEV05 REF 12/03/2019 " 53.2     FEV1 Ref 12/03/2019 2.36     FEV1 LLN 12/03/2019 1.85     FEV1 Pre Ref 12/03/2019 51.9     FEV1 FVC Ref 12/03/2019 81     FEV1 FVC LLN 12/03/2019 70     FEV1 FVC Pre Ref 12/03/2019 81.6     FEF 25 75 Ref 12/03/2019 2.49     FEF 25 75 LLN 12/03/2019 1.42     FEF 25 75 Pre Ref 12/03/2019 28.3     PEF Ref 12/03/2019 6.07     PEF LLN 12/03/2019 4.61     PEF Pre Ref 12/03/2019 66.7     DLCO Single Breath Ref 12/03/2019 21.43     DLCO Single Breath LLN 12/03/2019 15.70     DLCO SINGLEBREATH ULN 12/03/2019 27.17     DLCO Single Breath Pre R* 12/03/2019 59.8     DLCOc Single Breath Ref 12/03/2019 21.43     DLCOc Single Breath LLN 12/03/2019 15.70     DLCOC SINGLEBREATH ULN 12/03/2019 27.17     DLCOc Single Breath Pre * 12/03/2019 57.7     DLCOVA Ref 12/03/2019 5.21     DLCOVA LLN 12/03/2019 3.34     DLCOVA ULN 12/03/2019 7.09     DLCOVA Pre Ref 12/03/2019 73.1     DLCOc SBVA Ref 12/03/2019 5.21     DLCOc SBVA LLN 12/03/2019 3.34     DLCOCSBVA ULN 12/03/2019 7.09     DLCOc SBVA Pre Ref 12/03/2019 70.6     VA Single Breath Ref 12/03/2019 3.96     VA Single Breath LLN 12/03/2019 3.96     VA SINGLEBREATH ULN 12/03/2019 3.96     VA Single Breath Pre Ref 12/03/2019 85.0     IVC Single Breath Ref 12/03/2019 2.91     IVC Single Breath LLN 12/03/2019 2.28     IVC SINGLEBREATH ULN 12/03/2019 3.53     IVC Single Breath Pre Ref 12/03/2019 61.2        Pulmonary Function Tests 12/3/2019 5/23/2019 11/20/2018 5/10/2018 11/7/2017 7/12/2017 11/15/2016   FVC 1.84 1.99 2.32 2.11 2.24 2.24 2.13   FEV1 1.22 1.4 1.62 1.48 1.56 1.54 1.43   TLC (liters) - - - - - - -   DLCO (ml/mmHg sec) 12.8 13.8 - 14.3 - - -   FVC% 63 75 84 76 80 80 78   FEV1% 52 62 69 63 66 65 61   FEF 25-75 - - 0.99 0.92 0.96 0.87 0.78   FEF 25-75% - - 37 34 35 32 29   TLC% - - - - - - -   RV - - - - - - -   RV% - - - - - - -   DLCO% 60 73 - 75 - - -     Other:   6MW 12/3/2019 5/23/2019 2/25/2019 11/20/2018 4/25/2018 1/12/2018  11/7/2017   6MWT Status completed without stopping completed without stopping completed without stopping completed without stopping - completed without stopping completed without stopping   Patient Reported Dyspnea Dyspnea;Leg pain;Lightheadedness Dyspnea;Leg pain;Lightheadedness Dyspnea Dyspnea;Leg pain Dyspnea Dyspnea;Leg pain   Was O2 used? No No No No No No No   6MW Distance walked (feet) 1500 1450 1600 1562 1520 1500 1540   Distance walked (meters) 457.2 441.96 487.68 476.1 463.3 457.2 469.39   Did patient stop? No No No No No No No   Oxygen Saturation 98 98 97 97 98 98 99   Supplemental Oxygen Room Air Room Air Room Air Room Air Room Air Room Air Room Air   Heart Rate 91 65 82 76 79 82 79   Blood Pressure 103/80 112/72 135/78 129/71 121/70 106/60 136/93   Ab Dyspnea Rating  light moderate light light light light light   Oxygen Saturation 96 100 96 98 98 99 97   Supplemental Oxygen Room Air Room Air Room Air Room Air Room Air Room Air Room Air   Heart Rate 140 142 141 151 139 124 113   Blood Pressure 138/80 150/76 151/87 142/71 133/75 125/59 146/73   Ab Dyspnea Rating  very,very heavy very heavy very,very heavy very,very heavy very heavy very,very heavy very heavy   Recovery Time (seconds) 120 200 210 180 47 80 68   Oxygen Saturation 99 98 98 99 99 99 98   Supplemental Oxygen Room Air Room Air Room Air Room Air Room Air Room Air Room Air   Heart Rate 110 88 95 93 117 106 91     TTE (02/22/2019):     · Normal left ventricular systolic function. The estimated ejection fraction is 65%  · Normal LV diastolic function.  · Septal wall has abnormal motion.  · Mildly reduced right ventricular systolic function.  · Mild tricuspid regurgitation.  · The estimated PA systolic pressure is 54 mm Hg  · Pulmonary hypertension present.  · Normal central venous pressure (3 mm Hg).        RHC 8/20/18     PW: 8/6 (5)  PA: 101/35 (57)  RV: 92/0  RVEDP: 6   AO_SAT: 99  PA_SAT: 72  RA: 6/5 (4)  FICKCI: 2.5700  FICKCO:  4.0800  PVR: 1038.0000     RHC 9/4/19  · Severe PAH on IV remodulin, adcirca and letairis.  · Normal right and left-sided filling pressures.  · Borderline low-normal cardiac output by Ebony method which is normal when indexed for BSA.  · No signficant change from RHC 8/18.  · RA: 6/ 7/ 5   · RV: 105/ 3/ 10   · PA: 103/ 45/ 64  · PWP: 15/ 1512/ 13 .   · Cardiac output was 4.11 by Ebony.   · Cardiac index is 2.67 L/min/m2.   · O2 Sat: PA 70%. AO sat 96%  · PVR 12.4        Assessment:-  1. Primary pulmonary hypertension    2. Lung transplant candidate        Plan:   1. Followed by Dr. Rai for PAH.    -Currently on Adempas, Letairis, and Remodulin at 80ng.    -Her most recent RHC with mPAP at 65.    -Continue outpatient follow-up with Dr. Rai.    2. Continue with inhaler therapy for asthma.  Continue nasal regimen for allergic rhinitis which includes Flonase and Azelastine.      3. With regards to lung transplant for her PAH, workup has been deferred for financial and psychosocial issues.  PH seems to be stable on current therapy.     RTC 6 months     Jessee Jewell MD  Pulm/CC Fellow PGY6    Attending Note:    I have seen and evaluated the patient with the fellow. Their note reflects the content of our discussion and my plan of care.      Ralph Whyte MD  Pulmonary/Critical Care Medicine         Normal for race

## 2020-07-28 NOTE — H&P ADULT - PROBLEM SELECTOR PLAN 5
chronic HF with last TTE showing EF 40-45%  currently euvolemic on exam   c/w bumex 2mg QD and metolazone 3x/week   c/w metoprolol   monitor Cr

## 2020-07-28 NOTE — H&P ADULT - NSHPPHYSICALEXAM_GEN_ALL_CORE
Vital Signs Last 24 Hrs  T(C): 36.5 (28 Jul 2020 23:25), Max: 36.7 (28 Jul 2020 16:15)  T(F): 97.7 (28 Jul 2020 23:25), Max: 98.1 (28 Jul 2020 18:01)  HR: 89 (28 Jul 2020 23:25) (84 - 98)  BP: 136/80 (28 Jul 2020 23:25) (109/63 - 166/90)  BP(mean): --  RR: 18 (28 Jul 2020 23:25) (17 - 18)  SpO2: 96% (28 Jul 2020 23:25) (93% - 100%)    PHYSICAL EXAM:  GENERAL: NAD, well-developed, obese   HEAD:  Atraumatic, normocephalic  EYES: EOMI, conjunctiva and sclera clear  NECK: Supple, no JVD  CHEST/LUNG: Clear to auscultation bilaterally; no wheezing or rales  HEART: Regular rate and rhythm; no murmurs  ABDOMEN: Soft, nontender, nondistended; bowel sounds present  EXTREMITIES:  RLE with erythema and warmth from ankle to shins with 2 flaccid blisters with yellow material within it, +tenderness along medial aspect of shin; LLE with chronic venous stasis changes    PSYCH: calm affect, not anxious  NEUROLOGY: non-focal, AAOx3  SKIN: as above  MUSCULOSKELETAL: no back pain, moving all extremities

## 2020-07-28 NOTE — H&P ADULT - NSHPLABSRESULTS_GEN_ALL_CORE
Labs, imaging and EKG personally reviewed and interpreted by me. EKG is Afib 100s, no acute ischemic changes.                           13.7   9.87  )-----------( 239      ( 28 Jul 2020 20:23 )             45.9     07-28    137  |  93<L>  |  51<H>  ----------------------------<  151<H>  4.9   |  27  |  2.73<H>    Ca    10.0      28 Jul 2020 20:23    TPro  8.2  /  Alb  3.8  /  TBili  0.3  /  DBili  x   /  AST  19  /  ALT  5<L>  /  AlkPhos  92  07-28

## 2020-07-28 NOTE — ED ADULT NURSE NOTE - NSIMPLEMENTINTERV_GEN_ALL_ED
Implemented All Fall with Harm Risk Interventions:  [place] to call system. Call bell, personal items and telephone within reach. Instruct patient to call for assistance. Room bathroom lighting operational. Non-slip footwear when patient is off stretcher. Physically safe environment: no spills, clutter or unnecessary equipment. Stretcher in lowest position, wheels locked, appropriate side rails in place. Provide visual cue, wrist band, yellow gown, etc. Monitor gait and stability. Monitor for mental status changes and reorient to person, place, and time. Review medications for side effects contributing to fall risk. Reinforce activity limits and safety measures with patient and family. Provide visual clues: red socks.

## 2020-07-28 NOTE — ED PROVIDER NOTE - CLINICAL SUMMARY MEDICAL DECISION MAKING FREE TEXT BOX
Sangeeta Bautista): 65y M with PMHx of IDDM with polyneuropathy B/L, HLD, TIA in 2014, Afib, CAD s/p stents, p/w 2 weeks of worsening right leg pain, redness with formation of blisters. Pt currently on Bactrim, has completed 4 days of medication, now w/ worsening redness and pain. Concern for worsening cellulitis despite oral abx. Will obtain BW and right leg doppler to assess for DVT. Pt will be admitted for IV abx therapy.

## 2020-07-28 NOTE — ED PROVIDER NOTE - CARDIAC, MLM
Normal rate, regular rhythm.  Heart sounds S1, S2.  No murmurs, rubs or gallops. Pulses difficult to palpate, no distal wounds.

## 2020-07-29 ENCOUNTER — RESULT REVIEW (OUTPATIENT)
Age: 65
End: 2020-07-29

## 2020-07-29 LAB
A1C WITH ESTIMATED AVERAGE GLUCOSE RESULT: 9.7 % — HIGH (ref 4–5.6)
CREAT ?TM UR-MCNC: 82 MG/DL — SIGNIFICANT CHANGE UP
ESTIMATED AVERAGE GLUCOSE: 232 MG/DL — HIGH (ref 68–114)
GLUCOSE BLDC GLUCOMTR-MCNC: 103 MG/DL — HIGH (ref 70–99)
GLUCOSE BLDC GLUCOMTR-MCNC: 122 MG/DL — HIGH (ref 70–99)
GLUCOSE BLDC GLUCOMTR-MCNC: 148 MG/DL — HIGH (ref 70–99)
GLUCOSE BLDC GLUCOMTR-MCNC: 199 MG/DL — HIGH (ref 70–99)
GLUCOSE BLDC GLUCOMTR-MCNC: 275 MG/DL — HIGH (ref 70–99)
OSMOLALITY SERPL: 312 MOSMOL/KG — HIGH (ref 280–301)
OSMOLALITY UR: 445 MOSM/KG — SIGNIFICANT CHANGE UP (ref 50–1200)
SODIUM UR-SCNC: 93 MMOL/L — SIGNIFICANT CHANGE UP

## 2020-07-29 PROCEDURE — 99253 IP/OBS CNSLTJ NEW/EST LOW 45: CPT

## 2020-07-29 PROCEDURE — 99222 1ST HOSP IP/OBS MODERATE 55: CPT

## 2020-07-29 PROCEDURE — 99232 SBSQ HOSP IP/OBS MODERATE 35: CPT

## 2020-07-29 PROCEDURE — 88304 TISSUE EXAM BY PATHOLOGIST: CPT | Mod: 26

## 2020-07-29 RX ADMIN — OXYCODONE HYDROCHLORIDE 5 MILLIGRAM(S): 5 TABLET ORAL at 11:22

## 2020-07-29 RX ADMIN — OXYCODONE HYDROCHLORIDE 5 MILLIGRAM(S): 5 TABLET ORAL at 18:00

## 2020-07-29 RX ADMIN — DABIGATRAN ETEXILATE MESYLATE 150 MILLIGRAM(S): 150 CAPSULE ORAL at 05:39

## 2020-07-29 RX ADMIN — Medication 100 MILLIGRAM(S): at 15:14

## 2020-07-29 RX ADMIN — PIPERACILLIN AND TAZOBACTAM 25 GRAM(S): 4; .5 INJECTION, POWDER, LYOPHILIZED, FOR SOLUTION INTRAVENOUS at 05:39

## 2020-07-29 RX ADMIN — Medication 100 MILLIGRAM(S): at 05:39

## 2020-07-29 RX ADMIN — Medication 50 MILLIGRAM(S): at 05:39

## 2020-07-29 RX ADMIN — OXYCODONE HYDROCHLORIDE 5 MILLIGRAM(S): 5 TABLET ORAL at 00:30

## 2020-07-29 RX ADMIN — INSULIN GLARGINE 25 UNIT(S): 100 INJECTION, SOLUTION SUBCUTANEOUS at 00:51

## 2020-07-29 RX ADMIN — Medication 81 MILLIGRAM(S): at 11:58

## 2020-07-29 RX ADMIN — PIPERACILLIN AND TAZOBACTAM 25 GRAM(S): 4; .5 INJECTION, POWDER, LYOPHILIZED, FOR SOLUTION INTRAVENOUS at 15:14

## 2020-07-29 RX ADMIN — Medication 100 MILLIGRAM(S): at 22:55

## 2020-07-29 RX ADMIN — OXYCODONE HYDROCHLORIDE 5 MILLIGRAM(S): 5 TABLET ORAL at 10:49

## 2020-07-29 RX ADMIN — SENNA PLUS 2 TABLET(S): 8.6 TABLET ORAL at 22:55

## 2020-07-29 RX ADMIN — Medication 16 UNIT(S): at 17:14

## 2020-07-29 RX ADMIN — POLYETHYLENE GLYCOL 3350 17 GRAM(S): 17 POWDER, FOR SOLUTION ORAL at 11:58

## 2020-07-29 RX ADMIN — OXYCODONE HYDROCHLORIDE 5 MILLIGRAM(S): 5 TABLET ORAL at 23:30

## 2020-07-29 RX ADMIN — Medication 120 MILLIGRAM(S): at 05:39

## 2020-07-29 RX ADMIN — BUMETANIDE 2 MILLIGRAM(S): 0.25 INJECTION INTRAMUSCULAR; INTRAVENOUS at 05:39

## 2020-07-29 RX ADMIN — Medication 14 UNIT(S): at 13:08

## 2020-07-29 RX ADMIN — Medication 650 MILLIGRAM(S): at 20:34

## 2020-07-29 RX ADMIN — INSULIN GLARGINE 25 UNIT(S): 100 INJECTION, SOLUTION SUBCUTANEOUS at 22:55

## 2020-07-29 RX ADMIN — PIPERACILLIN AND TAZOBACTAM 25 GRAM(S): 4; .5 INJECTION, POWDER, LYOPHILIZED, FOR SOLUTION INTRAVENOUS at 22:55

## 2020-07-29 RX ADMIN — Medication 3: at 17:14

## 2020-07-29 RX ADMIN — Medication 1: at 08:44

## 2020-07-29 RX ADMIN — OXYCODONE HYDROCHLORIDE 5 MILLIGRAM(S): 5 TABLET ORAL at 17:22

## 2020-07-29 RX ADMIN — DABIGATRAN ETEXILATE MESYLATE 150 MILLIGRAM(S): 150 CAPSULE ORAL at 17:13

## 2020-07-29 RX ADMIN — Medication 12 UNIT(S): at 08:44

## 2020-07-29 RX ADMIN — Medication 250 MILLIGRAM(S): at 01:03

## 2020-07-29 RX ADMIN — Medication 50 MILLIGRAM(S): at 17:13

## 2020-07-29 RX ADMIN — Medication 650 MILLIGRAM(S): at 23:00

## 2020-07-29 NOTE — CONSULT NOTE ADULT - ASSESSMENT
ASSESSMENT/PLAN:  Patient is a 65y old  Male who presents with a chief complaint of RLE cellulitis (29 Jul 2020 11:27)      -  -  -  - ASSESSMENT/PLAN:  Patient is a 65y old  Male who presents with a chief complaint of RLE cellulitis  - Obtain MARICARMEN/PVR bilateral LE   - Obtain LE venous duplex bilateral   - Continue wound care per team   - Care per primary team   - Will continue to follow.     Discussed with Dr. Pinto     Vascular Surgery   [id number] ASSESSMENT/PLAN:  Patient is a 65y old M w/ extensive cardiac hx and DM2 w/ neuropathy, presents w/ RLE cellulitis 2/2 to non-healing wound. Vascular consulted for evaluation of PAD and possible venous insufficiency.     - Obtain MARICARMEN/PVR bilateral LE   - Obtain LE venous duplex bilateral   - Continue wound care per team   - Care per primary team   - Will continue to follow.     Discussed with Dr. Pinto     Vascular Surgery   [id number]

## 2020-07-29 NOTE — CONSULT NOTE ADULT - ASSESSMENT
Impression:    Right lower leg wound  Venous dermatitis  Suggestive of fluid overload  Peripheral neuropathy    Recommend:  1.) topical therapy: right lower leg wound - cleanse with sterile water, pat dry, apply acticoat Flex3 Mesh, cover with an abd, secure with an ace wrap every three days  2.) BLE elevation  3.) Emollient therapy: Moisturize intact skin w/ SWEEN cream daily  4.) Nutrition Optimization   5.) Glycemic control  6.) Regular Podiatry visits for routine diabetic foot and nail care  7.) Consider arterial and venous dopplers, MARICARMEN/PVR    Care as per medicine will follow w/ you  Upon discharge f/u as outpatient at [facility] [address] [place] [address] [phone]  Seen with Dr. Bedolla  Thank you for this consult  Melly Duvall, JOYCE-C, CWOCN [id number]

## 2020-07-29 NOTE — CONSULT NOTE ADULT - PROBLEM SELECTOR RECOMMENDATION 3
I Edward Pinto MD performed a history and physical exam of the patient and discussed  the findings and plan with the house officer. I reviewed the resident note and agree with the findings and plan

## 2020-07-29 NOTE — CONSULT NOTE ADULT - ATTENDING COMMENTS
Patient seen and examined, agree with the above assessment and plan by JOYCE Mc.  pt well known to our practice, PMH as above  P/W RLE cellulitis  CV stable  continue current medications  Abx per primary team

## 2020-07-29 NOTE — CONSULT NOTE ADULT - SUBJECTIVE AND OBJECTIVE BOX
Patient is a 65y old  Male who presents with a chief complaint of RLE cellulitis (28 Jul 2020 23:32)    HPI:  65M with PMH of CAD/MI s/p stents (~2014), T2DM on insulin c/b neuropathy, TIA, HTN, Afib on pradaxa, HFrEF (EF 40-45%) p/w RLE erythema and blisters. Pt states he initially noted a small blister on the medial aspect of his RLE shin 3 weeks ago, without any associated symptoms. Shortly after that blister popped, pt developed 2 much large fluid filled blisters on the anterior shin of RLE, with associated redness, warmth, mild swelling and pain. Pain is along with blisters, 7/10, sharp, worse with palpation, not associated with ambulation/weight bearing; no associated fevers or chills. Pt went to  on 7/23 and was diagnosed with cellulitis and started on bactrim which he has taken for past 4 days, without any improvement in symptoms and now noticing one of the blisters with yellow material within it. Pt has known peripheral vascular disease and neuropathy in b/l feet with significantly decreased sensation and pain; has never has cellulitis in the past but has had LE wounds in the past. (28 Jul 2020 23:32)      PAST MEDICAL & SURGICAL HISTORY:  Neuropathy  CAD (coronary artery disease)  MI (myocardial infarction): circa 2014  Atrial fibrillation  TIA (transient ischemic attack)  DM type 2 (diabetes mellitus, type 2)  HLD (hyperlipidemia)  HTN (hypertension), benign  S/P carotid endarterectomy: left  Status post angioplasty with stent: LULU x 3 2/7/2014      Social history:    FAMILY HISTORY:  Family history of heart disease    REVIEW OF SYSTEMS  General:	Denies any malaise fatigue or chills. Fevers absent    Skin:No rash  	     Allergies    No Known Allergies    Intolerances        Antimicrobials:    piperacillin/tazobactam IVPB.. 3.375 Gram(s) IV Intermittent every 8 hours  vancomycin  IVPB 1000 milliGRAM(s) IV Intermittent every 24 hours        Vital Signs Last 24 Hrs  T(C): 36.8 (29 Jul 2020 05:31), Max: 36.8 (29 Jul 2020 05:31)  T(F): 98.2 (29 Jul 2020 05:31), Max: 98.2 (29 Jul 2020 05:31)  HR: 84 (29 Jul 2020 05:31) (84 - 98)  BP: 152/89 (29 Jul 2020 05:31) (109/63 - 166/90)  BP(mean): --  RR: 18 (29 Jul 2020 05:31) (16 - 18)  SpO2: 95% (29 Jul 2020 05:31) (93% - 100%)           Constitutional:Comfortable.Awake and alert  No cachexia     Eyes:PERRL EOMI.NO discharge or conjunctival injection    ENMT:No sinus tenderness.No thrush.No pharyngeal exudate or erythema.Fair dental hygiene    Neck:Supple,No LN,no JVD      Respiratory:Good air entry bilaterally,CTA    Cardiovascular:S1 S2 wnl, No murmurs,rub or gallops    Gastrointestinal:Soft BS(+) no tenderness no masses ,No rebound or guarding    Genitourinary:No CVA tendereness     Rectal:    Extremities:N no pulses felt  larger open ulcer  deep on pretibial area of right shin  . [id number] chronic venous changes        Neurological:AAO X 3,No grossly focal deficits    Skin:No rash     Lymph Nodes:No palpable LNs    Musculoskeletal:No joint swelling or LOM    Psychiatric:Affect normal.                                13.7   9.87  )-----------( 239      ( 28 Jul 2020 20:23 )             45.9         07-28    137  |  93<L>  |  51<H>  ----------------------------<  151<H>  4.9   |  27  |  2.73<H>    Ca    10.0      28 Jul 2020 20:23    TPro  8.2  /  Alb  3.8  /  TBili  0.3  /  DBili  x   /  AST  19  /  ALT  5<L>  /  AlkPhos  92  07-28      RECENT CULTURES:      MICROBIOLOGY:          Radiology:      Assessment:        Recommendations and Plan:

## 2020-07-29 NOTE — CONSULT NOTE ADULT - EXTREMITIES COMMENTS
mod art insuff w mod trophic skin changes   right med malleolous superior to this wound 1.5cm diam w mild cellulitis   mod venous insuff w mod st dermatitis  bethany gaiter areas

## 2020-07-29 NOTE — CONSULT NOTE ADULT - SUBJECTIVE AND OBJECTIVE BOX
[facility]  [phone]  NEPHROLOGY      INITIAL CONSULT NOTE  --------------------------------------------------------------------------------  HPI:      65M with PMH of CAD/MI s/p stents (~2014), T2DM on insulin c/b neuropathy, TIA, HTN, Afib on pradaxa, HFrEF (EF 40-45%) p/w RLE erythema and blisters. Pt states he initially noted a small blister on the medial aspect of his RLE shin 3 weeks ago, without any associated symptoms. Shortly after that blister popped, pt developed 2 much large fluid filled blisters on the anterior shin of RLE, with associated redness, warmth, mild swelling and pain. Pain is along with blisters, 7/10, sharp, worse with palpation, not associated with ambulation/weight bearing; no associated fevers or chills. Pt went to  on 7/23 and was diagnosed with cellulitis and started on bactrim which he has taken for past 4 days PTA  without any improvement in symptoms and now noticing one of the blisters with yellow material within it. Pt has known peripheral vascular disease and neuropathy in b/l feet with significantly decreased sensation and pain. admitted for cellulitis and PAD. noticed with cr of 2.7. in 2019 last cr 1.6-1.7 mg/dl. renal consult called.       PAST HISTORY  --------------------------------------------------------------------------------  PAST MEDICAL & SURGICAL HISTORY:  Neuropathy  CAD (coronary artery disease)  MI (myocardial infarction): circa 2014  Atrial fibrillation  TIA (transient ischemic attack)  DM type 2 (diabetes mellitus, type 2)  HLD (hyperlipidemia)  HTN (hypertension), benign  S/P carotid endarterectomy: left  Status post angioplasty with stent: LULU x 3 2/7/2014    FAMILY HISTORY:  Family history of heart disease    PAST SOCIAL HISTORY:  denies tobacco use   ALLERGIES & MEDICATIONS  --------------------------------------------------------------------------------  Allergies    No Known Allergies    Intolerances      Standing Inpatient Medications  aspirin enteric coated 81 milliGRAM(s) Oral daily  buMETAnide 2 milliGRAM(s) Oral daily  dabigatran 150 milliGRAM(s) Oral two times a day  dextrose 5%. 1000 milliLiter(s) IV Continuous <Continuous>  dextrose 50% Injectable 12.5 Gram(s) IV Push once  dextrose 50% Injectable 25 Gram(s) IV Push once  dextrose 50% Injectable 25 Gram(s) IV Push once  diltiazem    milliGRAM(s) Oral daily  insulin glargine Injectable (LANTUS) 25 Unit(s) SubCutaneous at bedtime  insulin lispro (HumaLOG) corrective regimen sliding scale   SubCutaneous three times a day before meals  insulin lispro (HumaLOG) corrective regimen sliding scale   SubCutaneous at bedtime  insulin lispro Injectable (HumaLOG) 12 Unit(s) SubCutaneous before breakfast  insulin lispro Injectable (HumaLOG) 14 Unit(s) SubCutaneous before lunch  insulin lispro Injectable (HumaLOG) 16 Unit(s) SubCutaneous before dinner  metolazone 5 milliGRAM(s) Oral <User Schedule>  metoprolol succinate ER 50 milliGRAM(s) Oral two times a day  piperacillin/tazobactam IVPB.. 3.375 Gram(s) IV Intermittent every 8 hours  polyethylene glycol 3350 17 Gram(s) Oral daily  pregabalin 100 milliGRAM(s) Oral three times a day  senna 2 Tablet(s) Oral at bedtime    PRN Inpatient Medications  acetaminophen   Tablet .. 650 milliGRAM(s) Oral every 6 hours PRN  dextrose 40% Gel 15 Gram(s) Oral once PRN  glucagon  Injectable 1 milliGRAM(s) IntraMuscular once PRN  oxyCODONE    IR 5 milliGRAM(s) Oral every 6 hours PRN      REVIEW OF SYSTEMS  --------------------------------------------------------------------------------  Gen: No  fevers/chills   Skin: + blisters leg   Head/Eyes/Ears/Mouth: No headache; Normal hearing;  No sinus pain/discomfort, sore throat  Respiratory: No dyspnea, cough, wheezing  CV: No chest pain, orthopnea or palp   GI: No abdominal pain, diarrhea, nausea, vomiting, melena  : No dysuria, decrease urination or hesitancy urinating  hematuria, nocturia  MSK: No joint pain/swelling; no back pain  Neuro: No dizziness/lightheadedness    also with no edema     All other systems were reviewed and are negative, except as noted.    VITALS/PHYSICAL EXAM  --------------------------------------------------------------------------------  T(C): 36.9 (07-29-20 @ 19:49), Max: 36.9 (07-29-20 @ 19:49)  HR: 82 (07-29-20 @ 19:49) (82 - 92)  BP: 124/69 (07-29-20 @ 19:49) (123/75 - 153/78)  RR: 18 (07-29-20 @ 19:49) (16 - 18)  SpO2: 98% (07-29-20 @ 19:49) (93% - 98%)  Wt(kg): --  Height (cm): 180.34 (07-28-20 @ 16:15)  Weight (kg): 130.6 (07-28-20 @ 16:15)  BMI (kg/m2): 40.2 (07-28-20 @ 16:15)  BSA (m2): 2.46 (07-28-20 @ 16:15)      07-28-20 @ 07:01 - 07-29-20 @ 07:00  --------------------------------------------------------  IN: 610 mL / OUT: 0 mL / NET: 610 mL    07-29-20 @ 07:01  -  07-29-20 @ 23:17  --------------------------------------------------------  IN: 1180 mL / OUT: 1750 mL / NET: -570 mL      Physical Exam:  	Gen: Non toxic comfortable appearing  morbidly obese   	no jvd  	Pulm: decrease bs  no rales or ronchi or wheezing  	CV: RRR, S1S2; no rub  	Back: No CVA tenderness; no sacral edema  	Abd: +BS, soft, nontender/nondistended  	: No suprapubic tenderness  	UE: Warm, no cyanosis  no clubbing,  no edema  	LE: Warm,  RLE dressing over wound no edema LE   	Neuro: alert and oriented. speech coherent   	  LABS/STUDIES  --------------------------------------------------------------------------------              13.7   9.87  >-----------<  239      [07-28-20 @ 20:23]              45.9     137  |  93  |  51  ----------------------------<  151      [07-28-20 @ 20:23]  4.9   |  27  |  2.73        Ca     10.0     [07-28-20 @ 20:23]    TPro  8.2  /  Alb  3.8  /  TBili  0.3  /  DBili  x   /  AST  19  /  ALT  5   /  AlkPhos  92  [07-28-20 @ 20:23]        Serum Osmolality 312      [07-29-20 @ 08:34]    Creatinine Trend:  SCr 2.73 [07-28 @ 20:23]    Urinalysis - [10-17-19 @ 04:40]      Color Light Yellow / Appearance Clear / SG 1.015 / pH 6.5      Gluc 500 mg/dL / Ketone Negative  / Bili Negative / Urobili Negative       Blood Small / Protein 300 mg/dL / Leuk Est Negative / Nitrite Negative      RBC 8 / WBC 3 / Hyaline 0 / Gran  / Sq Epi  / Non Sq Epi 0 / Bacteria Negative    Urine Creatinine 82      [07-29-20 @ 05:37]  Urine Sodium 93      [07-29-20 @ 05:37]  Urine Osmolality 445      [07-29-20 @ 10:44]    HbA1c 11.7      [11-07-19 @ 09:14]    HBsAb Nonreact      [02-05-17 @ 11:45]  HBsAg Nonreact      [02-05-17 @ 11:45]  HCV 0.09, Nonreact      [02-03-17 @ 08:51]    dsDNA <12      [02-05-17 @ 11:45]  C3 Complement 123      [02-05-17 @ 11:45]  C4 Complement 33      [02-05-17 @ 11:45]  MPO-ANCA <5.0, interpretation: Negative      [02-05-17 @ 11:45]  PR3-ANCA <5.0, interpretation: Negative      [02-05-17 @ 11:45]  Free Light Chains: kappa 17.30, lambda 8.42, ratio = 2.05      [02-15 @ 07:29]  Immunofixation Serum:   Weak  IgG Kappa Band Identified    Reference Range: None Detected      [02-17-17 @ 07:53]  SPEP Interpretation: Weak Gamma-Migrating Paraprotein Identified      [02-17-17 @ 07:53]  Immunofixation Urine: No Monoclonal Band Identified      [02-18-17 @ 17:41]  UPEP Interpretation: Mild Selective (Glomerular) Proteinuria      [02-18-17 @ 17:41]

## 2020-07-29 NOTE — CONSULT NOTE ADULT - SUBJECTIVE AND OBJECTIVE BOX
Wound Surgery Consult Note:    HPI:  65M with PMH of CAD/MI s/p stents (~2014), T2DM on insulin c/b neuropathy, TIA, HTN, Afib on pradaxa, HFrEF (EF 40-45%) p/w RLE erythema and blisters. Pt states he initially noted a small blister on the medial aspect of his RLE shin 3 weeks ago, without any associated symptoms. Shortly after that blister popped, pt developed 2 much large fluid filled blisters on the anterior shin of RLE, with associated redness, warmth, mild swelling and pain. Pain is along with blisters, 7/10, sharp, worse with palpation, not associated with ambulation/weight bearing; no associated fevers or chills. Pt went to  on 7/23 and was diagnosed with cellulitis and started on bactrim which he has taken for past 4 days, without any improvement in symptoms and now noticing one of the blisters with yellow material within it. Pt has known peripheral vascular disease and neuropathy in b/l feet with significantly decreased sensation and pain; has never has cellulitis in the past but has had LE wounds in the past. (28 Jul 2020 23:32)    Request for wound care consult for Right leg wounds received. Patient encountered on an alternating air with low air loss surface resting comfortably with his legs elevated. He was seen with Dr. Bedolla who mechanically debrided surface slough with NS moistened gauze while examining and cleaning wound. See details below.    PAST MEDICAL & SURGICAL HISTORY:  Neuropathy  CAD (coronary artery disease)  MI (myocardial infarction): circa 2014  Atrial fibrillation  TIA (transient ischemic attack)  DM type 2 (diabetes mellitus, type 2)  HLD (hyperlipidemia)  HTN (hypertension), benign  S/P carotid endarterectomy: left  Status post angioplasty with stent: LULU x 3 2/7/2014    REVIEW OF SYSTEMS  General:	no fevers or chills  Respiratory and Thorax: no SOB  Cardiovascular:	no CP, CAD, MI  Gastrointestinal:	 no n/v  Genitourinary: no dysuria	  Musculoskeletal:	 ambulates  Neurological: no LOC, TIA	  Vascular: PVD, recent increased BLE swelling	  Endocrine:	DM  Skin: Right lower leg redness and blisters now disrupted    MEDICATIONS  (STANDING):  aspirin enteric coated 81 milliGRAM(s) Oral daily  buMETAnide 2 milliGRAM(s) Oral daily  dabigatran 150 milliGRAM(s) Oral two times a day  dextrose 5%. 1000 milliLiter(s) (50 mL/Hr) IV Continuous <Continuous>  dextrose 50% Injectable 12.5 Gram(s) IV Push once  dextrose 50% Injectable 25 Gram(s) IV Push once  dextrose 50% Injectable 25 Gram(s) IV Push once  diltiazem    milliGRAM(s) Oral daily  insulin glargine Injectable (LANTUS) 25 Unit(s) SubCutaneous at bedtime  insulin lispro (HumaLOG) corrective regimen sliding scale   SubCutaneous three times a day before meals  insulin lispro (HumaLOG) corrective regimen sliding scale   SubCutaneous at bedtime  insulin lispro Injectable (HumaLOG) 12 Unit(s) SubCutaneous before breakfast  insulin lispro Injectable (HumaLOG) 14 Unit(s) SubCutaneous before lunch  insulin lispro Injectable (HumaLOG) 16 Unit(s) SubCutaneous before dinner  metolazone 5 milliGRAM(s) Oral <User Schedule>  metoprolol succinate ER 50 milliGRAM(s) Oral two times a day  piperacillin/tazobactam IVPB.. 3.375 Gram(s) IV Intermittent every 8 hours  polyethylene glycol 3350 17 Gram(s) Oral daily  pregabalin 100 milliGRAM(s) Oral three times a day  senna 2 Tablet(s) Oral at bedtime    MEDICATIONS  (PRN):  acetaminophen   Tablet .. 650 milliGRAM(s) Oral every 6 hours PRN Temp greater or equal to 38C (100.4F), Mild Pain (1 - 3)  dextrose 40% Gel 15 Gram(s) Oral once PRN Blood Glucose LESS THAN 70 milliGRAM(s)/deciliter  glucagon  Injectable 1 milliGRAM(s) IntraMuscular once PRN Glucose LESS THAN 70 milligrams/deciliter  oxyCODONE    IR 5 milliGRAM(s) Oral every 6 hours PRN moderate and severe pain    Allergies    No Known Allergies    Intolerances    SOCIAL HISTORY:  Denies smoking, ETOH, drugs    FAMILY HISTORY:  Family history of heart disease    Vital Signs Last 24 Hrs  T(C): 36.8 (29 Jul 2020 05:31), Max: 36.8 (29 Jul 2020 05:31)  T(F): 98.2 (29 Jul 2020 05:31), Max: 98.2 (29 Jul 2020 05:31)  HR: 84 (29 Jul 2020 05:31) (84 - 98)  BP: 152/89 (29 Jul 2020 05:31) (109/63 - 166/90)  BP(mean): --  RR: 18 (29 Jul 2020 05:31) (16 - 18)  SpO2: 95% (29 Jul 2020 05:31) (93% - 100%)    Physical Exam:  General: A&Ox3, obese  Respiratory: no SOB  Gastrointestinal: soft NT/ND  Neurology: protective sensation diminished at toes  Musculoskeletal: no contractures or deformities  Vascular: BLE edema equal, DP/PT pulses not manually palpable, BLE equally warm  no acute ischemia noted  Skin:  Right lower leg wound - L 6cm x W 6cm x D 0.1cm - superficial tissue loss with necrosis of skin flap of blister that was removed with cleaning, wound bed is moist, beefy red, large amount of serosanguinous drainage, BLE with erythema and swelling, + TTP to both lower legs and feet, No odor, increased warmth, induration, fluctuance    LABS:  07-28    137  |  93<L>  |  51<H>  ----------------------------<  151<H>  4.9   |  27  |  2.73<H>    Ca    10.0      28 Jul 2020 20:23    TPro  8.2  /  Alb  3.8  /  TBili  0.3  /  DBili  x   /  AST  19  /  ALT  5<L>  /  AlkPhos  92  07-28                          13.7   9.87  )-----------( 239      ( 28 Jul 2020 20:23 )             45.9

## 2020-07-29 NOTE — CONSULT NOTE ADULT - SUBJECTIVE AND OBJECTIVE BOX
CARDIOLOGY CONSULT - Dr. Mazariegos     CHIEF COMPLAINT: RLE cellulitis     HPI:  65M with PMH of CAD/MI s/p stents (~), T2DM on insulin c/b neuropathy, TIA, HTN, Afib on pradaxa, HFrEF (EF 40-45%) p/w RLE erythema and blisters. Pt states he initially noted a small blister on the medial aspect of his RLE shin 3 weeks ago, without any associated symptoms. Shortly after that blister popped, pt developed 2 much large fluid filled blisters on the anterior shin of RLE, with associated redness, warmth, mild swelling and pain. Pain is along with blisters, /10, sharp, worse with palpation, not associated with ambulation/weight bearing; no associated fevers or chills. Pt went to  on  and was diagnosed with cellulitis and started on bactrim which he has taken for past 4 days, without any improvement in symptoms and now noticing one of the blisters with yellow material within it. Pt has known peripheral vascular disease and neuropathy in b/l feet with significantly decreased sensation and pain; has never has cellulitis in the past but has had LE wounds in the past. Patient denies any chest pain, dyspnea, palpitations, cough, syncope, edema, exertional symptoms, nausea, abdominal pain, fever, chills,  or rash.        PAST MEDICAL & SURGICAL HISTORY:  Neuropathy  CAD (coronary artery disease)  MI (myocardial infarction): circa   Atrial fibrillation  TIA (transient ischemic attack)  DM type 2 (diabetes mellitus, type 2)  HLD (hyperlipidemia)  HTN (hypertension), benign  S/P carotid endarterectomy: left  Status post angioplasty with stent: LULU x 3 2014          PREVIOUS DIAGNOSTIC TESTING:    [ ] Echocardiogram: < from: TTE with Doppler (w/Cont) (10.17. @ 10:23) >  Conclusions:  1. Moderate left atrial enlargement.  2. Normal left ventricular internal dimensions and wall  thicknesses.  3. Technically difficult study despite use of echo  contrast. Grossly borderline left ventricular systolic  dysfunction. Endocardial visualization enhanced with  intravenous injection of Ultrasonic Enhancing Agent  (Definity). No obvious wall wall motion abnormalities.  4. Right ventricle not well seen.  *** Compared with echocardiogram of 2018, no  significant changes noted (reported LVEF in last study is  slightly lower, but atrial fibrillation makes it difficult  to assess ejection fraction).    < end of copied text >  < from: TTE with Doppler (w/Cont) (10.17.19 @ 10:23) >  Conclusions:  1. Moderate left atrial enlargement.  2. Normal left ventricular internal dimensions and wall  thicknesses.  3. Technically difficult study despite use of echo  contrast. Grossly borderline left ventricular systolic  dysfunction. Endocardial visualization enhanced with  intravenous injection of Ultrasonic Enhancing Agent  (Definity). No obvious wall wall motion abnormalities.  4. Right ventricle not well seen.  *** Compared with echocardiogram of 2018, no  significant changes noted (reported LVEF in last study is  slightly lower, but atrial fibrillation makes it difficult  to assess ejection fraction).    < end of copied text >    [ ]  Catheterization:   < from: Cardiac Cath Lab (14 @ 08:52) >  [facility]  Department of Cardiology  [address] [place] [address]  [phone]  Cath Lab Report -- Comprehensive Report  Patient: DYLAN DEL CASTILLO  Study date: 2014  Account number: [id number]  MR number: [id number]  : 1955  Gender: Male  Race: W  Case Physician(s):  Colby Mazariegos M.D.  Fellow:  Fco Ledesma MD  Referring Physician:  Wicho Gallegos M.D.  INDICATIONS: Angina/MI: myocardial infarction without ST elevation  (NSTEMI), CCS class III, with onset 2-7 days prior to admission.  HISTORY: The patient has hypertension, insulin-controlled diabetes,  medication-treated dyslipidemia, morbid obesity, and a family history of  coronary artery disease. PRIOR CARDIOVASCULAR PROCEDURES: Stent of the mid  LAD.  PROCEDURE:  --  Intervention on distal circumflex: drug-eluting stent.  --  Intervention on proximal circumflex: drug-eluting stent.  TECHNIQUE: The risks and alternatives of the procedures and conscious  sedation were explained to the patient and informed consent was obtained.  Cardiac catheterization performed electively.  Local anesthetic given. Right radial artery access. RADIATION EXPOSURE: 16  min. A drug-eluting stent was performed on the 80 % lesion in the distal  circumflex. Following intervention there was a 1 % residual stenosis.  Vessel setup was performed. A 6FR EBU 3.5 LAUNCHER guiding catheter was  used to intubate the vessel. Vessel setup was performed. A Briefcase UNIVERSAL  190CM wire was used to cross the lesion. Balloon angioplasty was  performed, using a 2.5 X 20 MAVERICK RX balloon, with 3 inflations and a  maximum inflation pressure of 12 tom. A 2.50 X 30 RESOLUTE drug-eluting  stent was placed across the lesion and deployed at a maximum inflation  pressure of 16 tom. Balloon angioplasty was performed, with 1 inflations  and a maximum inflation pressure of 16 tom. A drug-eluting stent was  performed on the 80 % lesion in the proximal circumflex. Following  intervention there was a 1 % residual stenosis. Vessel setup was  performed. A 6FR EBU 3.5 LAUNCHER guiding catheter was used to intubate  the vessel. Vessel setup was performed. A BMW UNIVERSAL 190CM wire was  used to cross the lesion. A 3.50 X 15 RESOLUTE drug-eluting stent was  placed across the lesion and deployed at a maximum inflation pressure of  18 tom. Balloon angioplasty was performed, using a 4.0 X 12 NC SPRINTER  balloon, with 2 inflations and a maximum inflation pressure of 22 tom.  CONTRAST GIVEN: Omnipaque 189 ml.  MEDICATIONS GIVEN: Fentanyl, 25 mcg, IV. Midazolam, 2 mg, IV. Midazolam, 1  mg, IV. Verapamil (Isoptin, Calan, Covera), 2 mg, IA. Nitroglycerin, 100  mcg, intracoronary. Heparin, 3000 units, IA. Heparin, 5000 units, IV.  VENTRICLES: No left ventriculogram was performed.  CORONARY VESSELS:  CX:   --  Proximal circumflex: There was a 80 % stenosis.  --  Distal circumflex: There was a 80 % stenosis.  COMPLICATIONS: There were no complications.  DIAGNOSTIC RECOMMENDATIONS: Severe LAD and LCx disease. PCI of proximal and  distal LCx with LULU in the setting of angina/NSTEMI. Patients remains in  sinus rhythm. Continue ASA 81 and plavix daily. Start Pradaxa 150mg BID  for AF CVA ppx after band pull. Will consider stopping one antiplatelet in  4-6 weeks in light of his A/C need for atrial fibrillation. Evaluation in  progress for internal carotid artery intervention. Would wait 4-6 weeks if  clinically feasible in light of recent MI and stenting.  INTERVENTIONAL RECOMMENDATIONS: Severe LAD and LCx disease. PCI of proximal  and distal LCx with LULU in the setting of angina/NSTEMI. Patients remains  in sinus rhythm. Continue ASA 81 and plavix daily. Start Pradaxa 150mg BID  for AF CVA ppx after band pull. Will consider stopping one antiplatelet in  4-6 weeks in light of his A/C need for atrial fibrillation. Evaluation in  progress for internal carotid artery intervention. Would wait 4-6 weeks if  clinically feasible in light of recent MI and stenting.  Prepared and signed by  Colby Mazariegos M.D.    < end of copied text >  < from: Cardiac Cath Lab (14 @ 08:52) >  [facility]  Department of Cardiology  [address] [place] [address]  [phone]  Cath Lab Report -- Comprehensive Report  Patient: DYLAN DEL CASTILLO  Study date: 2014  Account number: [id number]  MR number: [id number]  : 1955  Gender: Male  Race: W  Case Physician(s):  Colby Mazariegos M.D.  Fellow:  Fco Ledesma MD  Referring Physician:  Wicho Gallegos M.D.  INDICATIONS: Angina/MI: myocardial infarction without ST elevation  (NSTEMI), CCS class III, with onset 2-7 days prior to admission.  HISTORY: The patient has hypertension, insulin-controlled diabetes,  medication-treated dyslipidemia, morbid obesity, and a family history of  coronary artery disease. PRIOR CARDIOVASCULAR PROCEDURES: Stent of the mid  LAD.  PROCEDURE:  --  Intervention on distal circumflex: drug-eluting stent.  --  Intervention on proximal circumflex: drug-eluting stent.  TECHNIQUE: The risks and alternatives of the procedures and conscious  sedation were explained to the patient and informed consent was obtained.  Cardiac catheterization performed electively.  Local anesthetic given. Right radial artery access. RADIATION EXPOSURE: 16  min. A drug-eluting stent was performed on the 80 % lesion in the distal  circumflex. Following intervention there was a 1 % residual stenosis.  Vessel setup was performed. A 6FR EBU 3.5 LAUNCHER guiding catheter was  used to intubate the vessel. Vessel setup was performed. A BMW UNIVERSAL  190CM wire was used to cross the lesion. Balloon angioplasty was  performed, using a 2.5 X 20 MAVERICK RX balloon, with 3 inflations and a  maximum inflation pressure of 12 tom. A 2.50 X 30 RESOLUTE drug-eluting  stent was placed across the lesion and deployed at a maximum inflation  pressure of 16 tom. Balloon angioplasty was performed, with 1 inflations  and a maximum inflation pressure of 16 tom. A drug-eluting stent was  performed on the 80 % lesion in the proximal circumflex. Following  intervention there was a 1 % residual stenosis. Vessel setup was  performed. A 6FR EBU 3.5 LAUNCHER guiding catheter was used to intubate  the vessel. Vessel setup was performed. A BMW UNIVERSAL 190CM wire was  used to cross the lesion. A 3.50 X 15 RESOLUTE drug-eluting stent was  placed across the lesion and deployed at a maximum inflation pressure of  18 tom. Balloon angioplasty was performed, using a 4.0 X 12 NC SPRINTER  balloon, with 2 inflations and a maximum inflation pressure of 22 tom.  CONTRAST GIVEN: Omnipaque 189 ml.  MEDICATIONS GIVEN: Fentanyl, 25 mcg, IV. Midazolam, 2 mg, IV. Midazolam, 1  mg, IV. Verapamil (Isoptin, Calan, Covera), 2 mg, IA. Nitroglycerin, 100  mcg, intracoronary. Heparin, 3000 units, IA. Heparin, 5000 units, IV.  VENTRICLES: No left ventriculogram was performed.  CORONARY VESSELS:  CX:   --  Proximal circumflex: There was a 80 % stenosis.  --  Distal circumflex: There was a 80 % stenosis.  COMPLICATIONS: There were no complications.  DIAGNOSTIC RECOMMENDATIONS: Severe LAD and LCx disease. PCI of proximal and  distal LCx with LULU in the setting of angina/NSTEMI. Patients remains in  sinus rhythm. Continue ASA 81 and plavix daily. Start Pradaxa 150mg BID  for AF CVA ppx after band pull. Will consider stopping one antiplatelet in  4-6 weeks in light of his A/C need for atrial fibrillation. Evaluation in  progress for internal carotid artery intervention. Would wait 4-6 weeks if  clinically feasible in light of recent MI and stenting.  INTERVENTIONAL RECOMMENDATIONS: Severe LAD and LCx disease. PCI of proximal  and distal LCx with LULU in the setting of angina/NSTEMI. Patients remains  in sinus rhythm. Continue ASA 81 and plavix daily. Start Pradaxa 150mg BID  for AF CVA ppx after band pull. Will consider stopping one antiplatelet in  4-6 weeks in light of his A/C need for atrial fibrillation. Evaluation in  progress for internal carotid artery intervention. Would wait 4-6 weeks if  clinically feasible in light of recent MI and stenting.  Prepared and signed by  Colby Mazariegos M.D.    < end of copied text >  [ ] Stress Test:  	    MEDICATIONS:  MEDICATIONS  (STANDING):  aspirin enteric coated 81 milliGRAM(s) Oral daily  buMETAnide 2 milliGRAM(s) Oral daily  dabigatran 150 milliGRAM(s) Oral two times a day  dextrose 5%. 1000 milliLiter(s) (50 mL/Hr) IV Continuous <Continuous>  dextrose 50% Injectable 12.5 Gram(s) IV Push once  dextrose 50% Injectable 25 Gram(s) IV Push once  dextrose 50% Injectable 25 Gram(s) IV Push once  diltiazem    milliGRAM(s) Oral daily  insulin glargine Injectable (LANTUS) 25 Unit(s) SubCutaneous at bedtime  insulin lispro (HumaLOG) corrective regimen sliding scale   SubCutaneous three times a day before meals  insulin lispro (HumaLOG) corrective regimen sliding scale   SubCutaneous at bedtime  insulin lispro Injectable (HumaLOG) 12 Unit(s) SubCutaneous before breakfast  insulin lispro Injectable (HumaLOG) 14 Unit(s) SubCutaneous before lunch  insulin lispro Injectable (HumaLOG) 16 Unit(s) SubCutaneous before dinner  metolazone 5 milliGRAM(s) Oral <User Schedule>  metoprolol succinate ER 50 milliGRAM(s) Oral two times a day  piperacillin/tazobactam IVPB.. 3.375 Gram(s) IV Intermittent every 8 hours  polyethylene glycol 3350 17 Gram(s) Oral daily  pregabalin 100 milliGRAM(s) Oral three times a day  senna 2 Tablet(s) Oral at bedtime      FAMILY HISTORY:  Family history of heart disease      SOCIAL HISTORY:    [x] Non-smoker  [ ] Smoker  [ ] Alcohol    Allergies    No Known Allergies    Intolerances    	    REVIEW OF SYSTEMS:  CONSTITUTIONAL: No fever, weight loss, or fatigue  EYES: No eye pain, visual disturbances, or discharge  ENMT:  No difficulty hearing, tinnitus, vertigo; No sinus or throat pain  NECK: No pain or stiffness  RESPIRATORY: No cough, wheezing, chills or hemoptysis; No Shortness of Breath  CARDIOVASCULAR: No chest pain, palpitations, passing out, dizziness, or leg swelling  GASTROINTESTINAL: No abdominal or epigastric pain. No nausea, vomiting, or hematemesis; No diarrhea or constipation. No melena or hematochezia.  GENITOURINARY: No dysuria, frequency, hematuria, or incontinence  NEUROLOGICAL: No headaches, memory loss, loss of strength, numbness, or tremors  SKIN: No itching, burning, rashes, or lesions , +redness, RLE , dsg C/D/I  	    [x ] All others negative	  [ ] Unable to obtain    PHYSICAL EXAM:  T(C): 36.8 (20 @ 05:31), Max: 36.8 (20 @ 05:31)  HR: 84 (20 @ 05:31) (84 - 98)  BP: 152/89 (20 @ 05:31) (109/63 - 166/90)  RR: 18 (20 @ 05:31) (16 - 18)  SpO2: 95% (20 @ 05:31) (93% - 100%)  Wt(kg): --  I&O's Summary    2020 07:01  -  2020 07:00  --------------------------------------------------------  IN: 610 mL / OUT: 0 mL / NET: 610 mL        Appearance: Normal	  Psychiatry: A & O x 3, Mood & affect appropriate  HEENT:   Normal oral mucosa, PERRL, EOMI	  Lymphatic: No lymphadenopathy  Cardiovascular: Normal S1 S2,RRR, No JVD, No murmurs  Respiratory: Lungs clear to auscultation	  Gastrointestinal:  Soft, Non-tender, + BS	  Skin: No rashes, No ecchymoses, No cyanosis	  Neurologic: Non-focal  Extremities: Normal range of motion, No clubbing, trace edema, hyperpigmentation, RLE dsg C/D/I   Vascular: Peripheral pulses palpable 2+ bilaterally    TELEMETRY: 	    ECG:  	  RADIOLOGY:   OTHER: 	  	  LABS:	 	    CARDIAC MARKERS:                                  13.7   9.87  )-----------( 239      ( 2020 20:23 )             45.9     -    137  |  93<L>  |  51<H>  ----------------------------<  151<H>  4.9   |  27  |  2.73<H>    Ca    10.0      2020 20:23    TPro  8.2  /  Alb  3.8  /  TBili  0.3  /  DBili  x   /  AST  19  /  ALT  5<L>  /  AlkPhos  92        proBNP:   Lipid Profile:   HgA1c:   TSH:

## 2020-07-29 NOTE — PROGRESS NOTE ADULT - ASSESSMENT
65M with PMH of CAD/MI s/p stents (~2014), T2DM on insulin c/b neuropathy, TIA, HTN, Afib on pradaxa, HFrEF (EF 40-45%) p/w RLE erythema and blisters, a/w cellulitis that has failed output abx therapy.      Problem/Plan - 1:  ·  Problem: Cellulitis of right lower extremity.    iv abs as per idpt   wound care consult   f/u culture   consider vascular consult   tylenol, oxycodone prn for pain control.      Problem/Plan - 2:  ·  Problem: ANT (acute kidney injury).    renal joaquin l    check urine lytes   monitor Cr, avoid nephrotoxins, renally dose medications.      Problem/Plan - 3:  hx atrial fibrillation.   c/w meds  c/w pradaxa for AC   cards eval   Problem/Plan - 4:  ·  Problem: Constipation  c/w senna and miralax.      Problem/Plan - 5:  ·  Problem: Chronic systolic heart failure.  Plan: chronic HF with last TTE showing EF 40-45%  currently euvolemic on exam   c/w bumex 2mg QD and metolazone 3x/week   c/w metoprolol   monitor Cr.      Problem/Plan - 6:  Problem: Type 2 diabetes mellitus with diabetic polyneuropathy, with long-term current use of insulin.   c/w home regimen of insulin - lantus 25units qhs and lispro 12/14/16 TID with meals  ISS and monitor FS ac and hs  c/w lyrica for neuropathy.     Problem/Plan - 7:  ·  Problem: Essential hypertension.  Plan: BP at goal, stable   c/w home antiHTN meds  monitor routinely.      Problem/Plan - 8:  ·  Problem: CAD (coronary artery disease).  Plan: MI/CAD s/p stents, last ~2014. No CP, EKG without ischemic changes   c/w ASA

## 2020-07-29 NOTE — CONSULT NOTE ADULT - ASSESSMENT
65 year old with CAD, DM, Neuropathy TIA, AF, presents with large deep ulcer over the right pretibial area that developed spontaneously as bulla  with history of trauma. No sings of infection on exam but  the area is somewhat red.       I am not sure that this is infected but he clearly has PVD and healing is going to be a problem      hold vanco in view of elevated creat and low likelihood of MRSA.  vascular studies and evaluation   prognosis for limb is guarded /

## 2020-07-29 NOTE — CONSULT NOTE ADULT - ATTENDING COMMENTS
66 yo diabetic male , never smoker, a/w cellulitis right leg  Mechanical debridement of pretibial right leg ruptured blisters performed  resultant wounds are superficial  Bilateral stasis dermatitis and varicosities visible while supine  No overt ischemia of either leg-advise baseline arterial and venous studies  OP f/u advised 66 yo diabetic male , never smoker, a/w cellulitis right leg  no PMH of DVT  On Pradaxa   Mechanical debridement of pretibial right leg ruptured blisters performed  resultant wounds are superficial  Bilateral stasis dermatitis and varicosities visible while supine  No overt ischemia of either leg-advise baseline arterial and venous studies  OP f/u advised

## 2020-07-29 NOTE — CONSULT NOTE ADULT - SUBJECTIVE AND OBJECTIVE BOX
HPI:        PAST MEDICAL & SURGICAL HISTORY:  Neuropathy  CAD (coronary artery disease)  MI (myocardial infarction): circa 2014  Atrial fibrillation  TIA (transient ischemic attack)  DM type 2 (diabetes mellitus, type 2)  HLD (hyperlipidemia)  HTN (hypertension), benign  S/P carotid endarterectomy: left  Status post angioplasty with stent: LULU x 3 2/7/2014      ROS: Negative except for HPI    MEDICATIONS:  aspirin enteric coated 81 milliGRAM(s) Oral daily  dabigatran 150 milliGRAM(s) Oral two times a day      buMETAnide 2 milliGRAM(s) Oral daily  diltiazem    milliGRAM(s) Oral daily  metolazone 5 milliGRAM(s) Oral <User Schedule>  metoprolol succinate ER 50 milliGRAM(s) Oral two times a day      acetaminophen   Tablet .. 650 milliGRAM(s) Oral every 6 hours PRN  dextrose 40% Gel 15 Gram(s) Oral once PRN  dextrose 5%. 1000 milliLiter(s) IV Continuous <Continuous>  dextrose 50% Injectable 12.5 Gram(s) IV Push once  dextrose 50% Injectable 25 Gram(s) IV Push once  dextrose 50% Injectable 25 Gram(s) IV Push once  glucagon  Injectable 1 milliGRAM(s) IntraMuscular once PRN  insulin glargine Injectable (LANTUS) 25 Unit(s) SubCutaneous at bedtime  insulin lispro (HumaLOG) corrective regimen sliding scale   SubCutaneous three times a day before meals  insulin lispro (HumaLOG) corrective regimen sliding scale   SubCutaneous at bedtime  insulin lispro Injectable (HumaLOG) 12 Unit(s) SubCutaneous before breakfast  insulin lispro Injectable (HumaLOG) 14 Unit(s) SubCutaneous before lunch  insulin lispro Injectable (HumaLOG) 16 Unit(s) SubCutaneous before dinner  oxyCODONE    IR 5 milliGRAM(s) Oral every 6 hours PRN  piperacillin/tazobactam IVPB.. 3.375 Gram(s) IV Intermittent every 8 hours  polyethylene glycol 3350 17 Gram(s) Oral daily  pregabalin 100 milliGRAM(s) Oral three times a day  senna 2 Tablet(s) Oral at bedtime      Allergies  No Known Allergies      SOCIAL HISTORY: Denies tobacco, social ETOH, denies illicit drug use    FAMILY HISTORY:  Family history of heart disease      Vital Signs Last 24 Hrs  T(C): 36.5 (29 Jul 2020 13:21), Max: 36.8 (29 Jul 2020 05:31)  T(F): 97.7 (29 Jul 2020 13:21), Max: 98.2 (29 Jul 2020 05:31)  HR: 82 (29 Jul 2020 13:21) (82 - 98)  BP: 123/75 (29 Jul 2020 13:21) (109/63 - 166/90)  BP(mean): --  RR: 18 (29 Jul 2020 13:21) (16 - 18)  SpO2: 93% (29 Jul 2020 13:21) (93% - 100%)    PHYSICAL EXAM:    Constitutional: NAD well-developed  Respiratory: CTAB   Cardiovascular: S1 and S2, RRR, no M/G/R  Gastrointestinal: soft, NT/ND  Extremities: RLE w/ darkened, tough skin, superficial medial leg wound w/o ulceration. Base appear pink, viable, no active bleeding. Surrounding skin erythematous. LLE darkened, tough skin, no wounds noted.   Vascular: RLE - PT/DP signals, LLE - PT/DP signals   Neurological: A/O x 3, no focal deficits      LABS:                        13.7   9.87  )-----------( 239      ( 28 Jul 2020 20:23 )             45.9     07-28    137  |  93<L>  |  51<H>  ----------------------------<  151<H>  4.9   |  27  |  2.73<H>    Ca    10.0      28 Jul 2020 20:23    TPro  8.2  /  Alb  3.8  /  TBili  0.3  /  DBili  x   /  AST  19  /  ALT  5<L>  /  AlkPhos  92  07-28 HPI:  61 y/o male with a past medical history of DMT2 c/b LLE neuropathy, CVA/TIA, Afib (on Pradaxa , MI, CAD x 3 stents, HTN, presented 7/28 w/ RLE blisters and associated pain/erythema. Pt states that about 3 weeks ago he noted a small, fluid filled blister near ankle, a few days later the blister ruptured leaving a superficial wound in its place. A few days later he noted 2 larger blisters in the surrounding area, which also ruptured. Pt states that due to increasing redness and pain at wounds, 4 days ago he went to MD.      PAST MEDICAL & SURGICAL HISTORY:  Neuropathy  CAD (coronary artery disease)  MI (myocardial infarction): circa 2014  Atrial fibrillation  TIA (transient ischemic attack)  DM type 2 (diabetes mellitus, type 2)  HLD (hyperlipidemia)  HTN (hypertension), benign  S/P carotid endarterectomy: left  Status post angioplasty with stent: LULU x 3 2/7/2014      ROS: Negative except for HPI    MEDICATIONS:  aspirin enteric coated 81 milliGRAM(s) Oral daily  dabigatran 150 milliGRAM(s) Oral two times a day      buMETAnide 2 milliGRAM(s) Oral daily  diltiazem    milliGRAM(s) Oral daily  metolazone 5 milliGRAM(s) Oral <User Schedule>  metoprolol succinate ER 50 milliGRAM(s) Oral two times a day      acetaminophen   Tablet .. 650 milliGRAM(s) Oral every 6 hours PRN  dextrose 40% Gel 15 Gram(s) Oral once PRN  dextrose 5%. 1000 milliLiter(s) IV Continuous <Continuous>  dextrose 50% Injectable 12.5 Gram(s) IV Push once  dextrose 50% Injectable 25 Gram(s) IV Push once  dextrose 50% Injectable 25 Gram(s) IV Push once  glucagon  Injectable 1 milliGRAM(s) IntraMuscular once PRN  insulin glargine Injectable (LANTUS) 25 Unit(s) SubCutaneous at bedtime  insulin lispro (HumaLOG) corrective regimen sliding scale   SubCutaneous three times a day before meals  insulin lispro (HumaLOG) corrective regimen sliding scale   SubCutaneous at bedtime  insulin lispro Injectable (HumaLOG) 12 Unit(s) SubCutaneous before breakfast  insulin lispro Injectable (HumaLOG) 14 Unit(s) SubCutaneous before lunch  insulin lispro Injectable (HumaLOG) 16 Unit(s) SubCutaneous before dinner  oxyCODONE    IR 5 milliGRAM(s) Oral every 6 hours PRN  piperacillin/tazobactam IVPB.. 3.375 Gram(s) IV Intermittent every 8 hours  polyethylene glycol 3350 17 Gram(s) Oral daily  pregabalin 100 milliGRAM(s) Oral three times a day  senna 2 Tablet(s) Oral at bedtime      Allergies  No Known Allergies      SOCIAL HISTORY: Denies tobacco, social ETOH, denies illicit drug use    FAMILY HISTORY:  Family history of heart disease      Vital Signs Last 24 Hrs  T(C): 36.5 (29 Jul 2020 13:21), Max: 36.8 (29 Jul 2020 05:31)  T(F): 97.7 (29 Jul 2020 13:21), Max: 98.2 (29 Jul 2020 05:31)  HR: 82 (29 Jul 2020 13:21) (82 - 98)  BP: 123/75 (29 Jul 2020 13:21) (109/63 - 166/90)  BP(mean): --  RR: 18 (29 Jul 2020 13:21) (16 - 18)  SpO2: 93% (29 Jul 2020 13:21) (93% - 100%)    PHYSICAL EXAM:    Constitutional: NAD well-developed  Respiratory: CTAB   Cardiovascular: S1 and S2, RRR, no M/G/R  Gastrointestinal: soft, NT/ND  Extremities: RLE w/ darkened, tough skin, superficial medial leg wound w/o ulceration. Base appear pink, viable, no active bleeding. Surrounding skin erythematous. LLE darkened, tough skin, no wounds noted.   Vascular: RLE - PT/DP signals, LLE - PT/DP signals   Neurological: A/O x 3, no focal deficits      LABS:                        13.7   9.87  )-----------( 239      ( 28 Jul 2020 20:23 )             45.9     07-28    137  |  93<L>  |  51<H>  ----------------------------<  151<H>  4.9   |  27  |  2.73<H>    Ca    10.0      28 Jul 2020 20:23    TPro  8.2  /  Alb  3.8  /  TBili  0.3  /  DBili  x   /  AST  19  /  ALT  5<L>  /  AlkPhos  92  07-28 HPI:  59 y/o male with a past medical history of DMT2 c/b LLE neuropathy, CVA/TIA, Afib (on Pradaxa , MI, CAD x 3 stents, HTN, presented 7/28 w/ RLE blisters and associated pain/erythema. Pt states that about 3 weeks ago he noted a small, fluid filled blister near ankle, a few days later the blister ruptured leaving a superficial wound in its place. A few days later he noted 2 larger blisters in the surrounding area, which also ruptured. Pt states that due to increasing redness and pain at wounds, 4 days ago [facility] went [facility] and was started on bactrim for cellulitis. Pt states after 4 days of abx he did not note any improvement and came in to ED. Pt reports that he has had bilateral LE pain since MI approx 8 yrs ago. Report it is stabbing pain and improves on standing, states pain has worsened over the years. He denies claudication or additional LE wounds. Pt states that he has had testing for LE skin changes and pain, although unsure what tests were administered. Upon chart review pt was seen by Dr. Pinto after L. CEA and noted to have had a LE venous duplex in 2017 that did not find evidence of dvt or venous insufficiency. Denies fever, chills, sob, LE swelling or trauma to affected area.    PAST MEDICAL & SURGICAL HISTORY:  Neuropathy  CAD (coronary artery disease)  MI (myocardial infarction): circa 2014  Atrial fibrillation  TIA (transient ischemic attack)  DM type 2 (diabetes mellitus, type 2)  HLD (hyperlipidemia)  HTN (hypertension), benign  S/P carotid endarterectomy: left  Status post angioplasty with stent: LULU x 3 2/7/2014      ROS: Negative except for HPI    MEDICATIONS:  aspirin enteric coated 81 milliGRAM(s) Oral daily  dabigatran 150 milliGRAM(s) Oral two times a day      buMETAnide 2 milliGRAM(s) Oral daily  diltiazem    milliGRAM(s) Oral daily  metolazone 5 milliGRAM(s) Oral <User Schedule>  metoprolol succinate ER 50 milliGRAM(s) Oral two times a day      acetaminophen   Tablet .. 650 milliGRAM(s) Oral every 6 hours PRN  dextrose 40% Gel 15 Gram(s) Oral once PRN  dextrose 5%. 1000 milliLiter(s) IV Continuous <Continuous>  dextrose 50% Injectable 12.5 Gram(s) IV Push once  dextrose 50% Injectable 25 Gram(s) IV Push once  dextrose 50% Injectable 25 Gram(s) IV Push once  glucagon  Injectable 1 milliGRAM(s) IntraMuscular once PRN  insulin glargine Injectable (LANTUS) 25 Unit(s) SubCutaneous at bedtime  insulin lispro (HumaLOG) corrective regimen sliding scale   SubCutaneous three times a day before meals  insulin lispro (HumaLOG) corrective regimen sliding scale   SubCutaneous at bedtime  insulin lispro Injectable (HumaLOG) 12 Unit(s) SubCutaneous before breakfast  insulin lispro Injectable (HumaLOG) 14 Unit(s) SubCutaneous before lunch  insulin lispro Injectable (HumaLOG) 16 Unit(s) SubCutaneous before dinner  oxyCODONE    IR 5 milliGRAM(s) Oral every 6 hours PRN  piperacillin/tazobactam IVPB.. 3.375 Gram(s) IV Intermittent every 8 hours  polyethylene glycol 3350 17 Gram(s) Oral daily  pregabalin 100 milliGRAM(s) Oral three times a day  senna 2 Tablet(s) Oral at bedtime      Allergies  No Known Allergies      SOCIAL HISTORY: Denies tobacco, social ETOH, denies illicit drug use    FAMILY HISTORY:  Family history of heart disease      Vital Signs Last 24 Hrs  T(C): 36.5 (29 Jul 2020 13:21), Max: 36.8 (29 Jul 2020 05:31)  T(F): 97.7 (29 Jul 2020 13:21), Max: 98.2 (29 Jul 2020 05:31)  HR: 82 (29 Jul 2020 13:21) (82 - 98)  BP: 123/75 (29 Jul 2020 13:21) (109/63 - 166/90)  BP(mean): --  RR: 18 (29 Jul 2020 13:21) (16 - 18)  SpO2: 93% (29 Jul 2020 13:21) (93% - 100%)    PHYSICAL EXAM:    Constitutional: NAD well-developed  Respiratory: CTAB   Cardiovascular: S1 and S2, RRR, no M/G/R  Gastrointestinal: soft, NT/ND  Extremities: RLE w/ darkened, tough skin, superficial medial leg wound (10x5cm)  w/o ulceration. Base appear pink, viable, no active bleeding. Surrounding skin erythematous. LLE darkened, tough skin, no wounds noted.   Vascular: RLE - PT/DP signals, LLE - PT/DP signals   Neurological: A/O x 3, no focal deficits      LABS:                        13.7   9.87  )-----------( 239      ( 28 Jul 2020 20:23 )             45.9     07-28    137  |  93<L>  |  51<H>  ----------------------------<  151<H>  4.9   |  27  |  2.73<H>    Ca    10.0      28 Jul 2020 20:23    TPro  8.2  /  Alb  3.8  /  TBili  0.3  /  DBili  x   /  AST  19  /  ALT  5<L>  /  AlkPhos  92  07-28

## 2020-07-29 NOTE — CONSULT NOTE ADULT - NS ABD PE RECTAL EXAM
Romaine PGY3: percocet accidentally sent to [facility] pharmacy but it is closed. [facility] called with message left. new rx sent to [facility] not examined

## 2020-07-29 NOTE — CONSULT NOTE ADULT - ASSESSMENT
65M with PMH of CAD/MI s/p stents (~2014), T2DM on insulin c/b neuropathy, TIA, HTN, Afib on pradaxa, HFrEF (EF 40-45%) p/w RLE erythema and blisters, Dx of RLE cellulitis.     1. RLE Cellulitis   IV abx per ID  wound care f/u  consider vascular consult   pain management    2. ANT  renal eval     3. Chronic Congestive heart failure, Diastolic.   -volume status stable  -continue bumex, metol for now, creat noted - continue to trend     3. Atrial Fibrillation, chronic   -rates stable   -c/w metoprolol succinate ER 50mg BID   -c/w cardizem  mg daily    -a/c,  pradaxa 150mg BID    4. Coronary Artery Disease. S/P PCI to LAD, LCx.  -stable, Continue ASA 81mg

## 2020-07-29 NOTE — CONSULT NOTE ADULT - ASSESSMENT
65M with PMH of CAD/MI s/p stents (~2014), T2DM on insulin c/b neuropathy, TIA, HTN, Afib on pradaxa, HFrEF (EF 40-45%) p/w RLE erythema and blisters. Pknown peripheral vascular disease and neuropathy in b/l feet with significantly decreased sensation and pain. admitted for cellulitis and PAD. noticed with cr of 2.7. in 2019 last cr 1.6-1.7 mg/dl    1- ANT on ckd III   2- paraproteinemia   3- hx chf   4- HTN       ant in setting of infection + bactrim use +/- ? over diureses  pt to have repeat spep/ipep and urine bence pinto ? MGUS pt is not anemic  cont bumex 2 mg qd and metolazone 5mg tiw   check uric acid   cont cardizem cd 120 mg qd and toprol   trend cr   dc  bactrim

## 2020-07-30 ENCOUNTER — TRANSCRIPTION ENCOUNTER (OUTPATIENT)
Age: 65
End: 2020-07-30

## 2020-07-30 DIAGNOSIS — I77.1 STRICTURE OF ARTERY: ICD-10-CM

## 2020-07-30 DIAGNOSIS — I87.2 VENOUS INSUFFICIENCY (CHRONIC) (PERIPHERAL): ICD-10-CM

## 2020-07-30 DIAGNOSIS — I83.893 VARICOSE VEINS OF BILATERAL LOWER EXTREMITIES WITH OTHER COMPLICATIONS: ICD-10-CM

## 2020-07-30 LAB
ALBUMIN SERPL ELPH-MCNC: 3.5 G/DL — SIGNIFICANT CHANGE UP (ref 3.3–5)
ALP SERPL-CCNC: 88 U/L — SIGNIFICANT CHANGE UP (ref 40–120)
ALT FLD-CCNC: <5 U/L — LOW (ref 10–45)
ANION GAP SERPL CALC-SCNC: 14 MMOL/L — SIGNIFICANT CHANGE UP (ref 5–17)
AST SERPL-CCNC: 13 U/L — SIGNIFICANT CHANGE UP (ref 10–40)
BILIRUB SERPL-MCNC: 0.4 MG/DL — SIGNIFICANT CHANGE UP (ref 0.2–1.2)
BUN SERPL-MCNC: 50 MG/DL — HIGH (ref 7–23)
CALCIUM SERPL-MCNC: 9.4 MG/DL — SIGNIFICANT CHANGE UP (ref 8.4–10.5)
CHLORIDE SERPL-SCNC: 93 MMOL/L — LOW (ref 96–108)
CO2 SERPL-SCNC: 28 MMOL/L — SIGNIFICANT CHANGE UP (ref 22–31)
CREAT SERPL-MCNC: 2.6 MG/DL — HIGH (ref 0.5–1.3)
GLUCOSE BLDC GLUCOMTR-MCNC: 126 MG/DL — HIGH (ref 70–99)
GLUCOSE BLDC GLUCOMTR-MCNC: 161 MG/DL — HIGH (ref 70–99)
GLUCOSE BLDC GLUCOMTR-MCNC: 163 MG/DL — HIGH (ref 70–99)
GLUCOSE BLDC GLUCOMTR-MCNC: 225 MG/DL — HIGH (ref 70–99)
GLUCOSE SERPL-MCNC: 160 MG/DL — HIGH (ref 70–99)
HCT VFR BLD CALC: 46.9 % — SIGNIFICANT CHANGE UP (ref 39–50)
HGB BLD-MCNC: 13.8 G/DL — SIGNIFICANT CHANGE UP (ref 13–17)
MCHC RBC-ENTMCNC: 24.1 PG — LOW (ref 27–34)
MCHC RBC-ENTMCNC: 29.4 GM/DL — LOW (ref 32–36)
MCV RBC AUTO: 81.8 FL — SIGNIFICANT CHANGE UP (ref 80–100)
NRBC # BLD: 0 /100 WBCS — SIGNIFICANT CHANGE UP (ref 0–0)
PLATELET # BLD AUTO: 249 K/UL — SIGNIFICANT CHANGE UP (ref 150–400)
POTASSIUM SERPL-MCNC: 4.6 MMOL/L — SIGNIFICANT CHANGE UP (ref 3.5–5.3)
POTASSIUM SERPL-SCNC: 4.6 MMOL/L — SIGNIFICANT CHANGE UP (ref 3.5–5.3)
PROT SERPL-MCNC: 7.8 G/DL — SIGNIFICANT CHANGE UP (ref 6–8.3)
RBC # BLD: 5.73 M/UL — SIGNIFICANT CHANGE UP (ref 4.2–5.8)
RBC # FLD: 17.4 % — HIGH (ref 10.3–14.5)
SARS-COV-2 IGG SERPL QL IA: NEGATIVE — SIGNIFICANT CHANGE UP
SARS-COV-2 IGM SERPL IA-ACNC: <0.1 INDEX — SIGNIFICANT CHANGE UP
SODIUM SERPL-SCNC: 135 MMOL/L — SIGNIFICANT CHANGE UP (ref 135–145)
WBC # BLD: 11.35 K/UL — HIGH (ref 3.8–10.5)
WBC # FLD AUTO: 11.35 K/UL — HIGH (ref 3.8–10.5)

## 2020-07-30 PROCEDURE — 93970 EXTREMITY STUDY: CPT | Mod: 26

## 2020-07-30 PROCEDURE — 99232 SBSQ HOSP IP/OBS MODERATE 35: CPT

## 2020-07-30 PROCEDURE — 93923 UPR/LXTR ART STDY 3+ LVLS: CPT | Mod: 26

## 2020-07-30 RX ADMIN — Medication 81 MILLIGRAM(S): at 12:40

## 2020-07-30 RX ADMIN — Medication 12 UNIT(S): at 08:30

## 2020-07-30 RX ADMIN — BUMETANIDE 2 MILLIGRAM(S): 0.25 INJECTION INTRAMUSCULAR; INTRAVENOUS at 05:25

## 2020-07-30 RX ADMIN — SENNA PLUS 2 TABLET(S): 8.6 TABLET ORAL at 22:06

## 2020-07-30 RX ADMIN — Medication 14 UNIT(S): at 12:42

## 2020-07-30 RX ADMIN — Medication 100 MILLIGRAM(S): at 05:25

## 2020-07-30 RX ADMIN — DABIGATRAN ETEXILATE MESYLATE 150 MILLIGRAM(S): 150 CAPSULE ORAL at 05:25

## 2020-07-30 RX ADMIN — POLYETHYLENE GLYCOL 3350 17 GRAM(S): 17 POWDER, FOR SOLUTION ORAL at 12:38

## 2020-07-30 RX ADMIN — OXYCODONE HYDROCHLORIDE 5 MILLIGRAM(S): 5 TABLET ORAL at 13:08

## 2020-07-30 RX ADMIN — INSULIN GLARGINE 25 UNIT(S): 100 INJECTION, SOLUTION SUBCUTANEOUS at 22:05

## 2020-07-30 RX ADMIN — Medication 50 MILLIGRAM(S): at 05:25

## 2020-07-30 RX ADMIN — Medication 1: at 08:29

## 2020-07-30 RX ADMIN — PIPERACILLIN AND TAZOBACTAM 25 GRAM(S): 4; .5 INJECTION, POWDER, LYOPHILIZED, FOR SOLUTION INTRAVENOUS at 22:05

## 2020-07-30 RX ADMIN — Medication 2: at 17:19

## 2020-07-30 RX ADMIN — OXYCODONE HYDROCHLORIDE 5 MILLIGRAM(S): 5 TABLET ORAL at 19:03

## 2020-07-30 RX ADMIN — OXYCODONE HYDROCHLORIDE 5 MILLIGRAM(S): 5 TABLET ORAL at 05:25

## 2020-07-30 RX ADMIN — Medication 50 MILLIGRAM(S): at 17:19

## 2020-07-30 RX ADMIN — DABIGATRAN ETEXILATE MESYLATE 150 MILLIGRAM(S): 150 CAPSULE ORAL at 17:19

## 2020-07-30 RX ADMIN — Medication 120 MILLIGRAM(S): at 05:25

## 2020-07-30 RX ADMIN — Medication 100 MILLIGRAM(S): at 22:05

## 2020-07-30 RX ADMIN — PIPERACILLIN AND TAZOBACTAM 25 GRAM(S): 4; .5 INJECTION, POWDER, LYOPHILIZED, FOR SOLUTION INTRAVENOUS at 05:25

## 2020-07-30 RX ADMIN — OXYCODONE HYDROCHLORIDE 5 MILLIGRAM(S): 5 TABLET ORAL at 05:18

## 2020-07-30 RX ADMIN — Medication 100 MILLIGRAM(S): at 12:41

## 2020-07-30 RX ADMIN — PIPERACILLIN AND TAZOBACTAM 25 GRAM(S): 4; .5 INJECTION, POWDER, LYOPHILIZED, FOR SOLUTION INTRAVENOUS at 12:41

## 2020-07-30 RX ADMIN — OXYCODONE HYDROCHLORIDE 5 MILLIGRAM(S): 5 TABLET ORAL at 12:38

## 2020-07-30 RX ADMIN — OXYCODONE HYDROCHLORIDE 5 MILLIGRAM(S): 5 TABLET ORAL at 06:51

## 2020-07-30 RX ADMIN — Medication 16 UNIT(S): at 17:19

## 2020-07-30 NOTE — PROGRESS NOTE ADULT - ASSESSMENT
65M with PMH of CAD/MI s/p stents (~2014), T2DM on insulin c/b neuropathy, TIA, HTN, Afib on pradaxa, HFrEF (EF 40-45%) p/w RLE erythema and blisters, a/w cellulitis that has failed output abx therapy.      Problem/Plan - 1:  ·  Problem: Cellulitis of right lower extremity.    iv abs as per id  wound care  f/u culture    vascular f/u  carmen/dopplers pending  tylenol, oxycodone prn for pain control.      Problem/Plan - 2:  ·  Problem: ANT (acute kidney injury).    renal eval noted  monitor Cr, avoid nephrotoxins, renally dose medications.      Problem/Plan - 3:  hx atrial fibrillation.   c/w meds  c/w pradaxa for AC   cards eval noted   Problem/Plan - 4:  ·  Problem: Constipation  c/w senna and miralax.      Problem/Plan - 5:  ·  Problem: Chronic systolic heart failure.  Plan: chronic HF with last TTE showing EF 40-45%  c/w bumex 2mg QD and metolazone 3x/week   c/w metoprolol   monitor Cr.      Problem/Plan - 6:  Problem: Type 2 diabetes mellitus with diabetic polyneuropathy, with long-term current use of insulin.   c/w home regimen of insulin - lantus 25units qhs and lispro 12/14/16 TID with meals  ISS and monitor FS ac and hs  c/w lyrica for neuropathy.     Problem/Plan - 7:  ·  Problem: Essential hypertension.  Plan: BP at goal, stable   c/w home antiHTN meds       Problem/Plan - 8:  ·  Problem: CAD (coronary artery disease).  Plan: MI/CAD s/p stents, last ~2014. No CP, EKG without ischemic changes   c/w ASA

## 2020-07-30 NOTE — PHYSICAL THERAPY INITIAL EVALUATION ADULT - ADDITIONAL COMMENTS
Pt resides with spouse and family in private house  which has  3steps to enter HR present .PTA  he was independent in all his ADL's and use cane for ambulation. also owns a ROlator. and has a mini scooter.

## 2020-07-30 NOTE — DISCHARGE NOTE PROVIDER - PROVIDER TOKENS
PROVIDER:[TOKEN:[[id number]]] PROVIDER:[TOKEN:[[id number]]],PROVIDER:[TOKEN:[[id number]]] PROVIDER:[TOKEN:[[id number]],FOLLOWUP:[1 week]],PROVIDER:[TOKEN:[[id number]],FOLLOWUP:[1 month]],PROVIDER:[TOKEN:[[id number]],FOLLOWUP:[2 weeks]] PROVIDER:[TOKEN:[[id number]],FOLLOWUP:[1 week]],PROVIDER:[TOKEN:[[id number]],FOLLOWUP:[2 weeks]],PROVIDER:[TOKEN:[[id number]],FOLLOWUP:[2 weeks]]

## 2020-07-30 NOTE — DISCHARGE NOTE PROVIDER - CARE PROVIDER_API CALL
Yayo Gallegos  INTERNAL MEDICINE  [address] [facility] [address]  [place] [address]  Phone: [phone]  Fax: [phone]  Follow Up Time: Yayo Gallegos  INTERNAL MEDICINE  [address] [place] [address]  Phone: [phone]  Fax: [phone]  Follow Up Time:     Edward Pinto  VASCULAR SURGERY  [address] [place] [address]  Phone: [phone]  Fax: [phone]  Follow Up Time: Yayo Gallegos  INTERNAL MEDICINE  [address] [place] [address]  Phone: [phone]  Fax: [phone]  Follow Up Time: 1 week    Edward Pinto  VASCULAR SURGERY  [address] [place] [address]  Phone: [phone]  Fax: [phone]  Follow Up Time: 1 month    Rajan Bedolla  SURGERY - [address] [facility]  [address] [place] [address]  Phone: [phone]  Fax: [phone]  Follow Up Time: 2 weeks Yayo Gallegos  INTERNAL MEDICINE  [address] [place] [address]  Phone: [phone]  Fax: [phone]  Follow Up Time: 1 week    Edward Pinto  VASCULAR SURGERY  [address] [place] [address]  Phone: [phone]  Fax: [phone]  Follow Up Time: 2 weeks    aRjan Bedolla  SURGERY - [address] [facility]  [address] [place] [address]  Phone: [phone]  Fax: [phone]  Follow Up Time: 2 weeks

## 2020-07-30 NOTE — DISCHARGE NOTE PROVIDER - CARE PROVIDERS DIRECT ADDRESSES
,DirectAddress_Unknown ,DirectAddress_Unknown,[email or web address] ,DirectAddress_Unknown,[email or web address]

## 2020-07-30 NOTE — PROGRESS NOTE ADULT - ASSESSMENT
65 year old man with PMH of CAD/MI s/p PCI 2014, T2DM on insulin c/b neuropathy, TIA, HTN, Afib on pradaxa, HFrEF (EF 40-45%) p/w RLE erythema and blisters, Dx of RLE cellulitis.     1. RLE Cellulitis   -IV abx per ID  -wound care f/u  -vasc f/u  -pain management  -venous, arterial leg studies    2. ANT/CKD  -renal f/u  -continue diuretics as ordered    3. Chronic systolic and diastolic heart failure  -volume status stable  -continue bumex daily as ordered with metol for now as ordered  -cont to monitor creat    3. Atrial Fibrillation, chronic   -rates stable   -c/w metoprolol succinate ER 50mg BID   -c/w cardizem  mg daily    -a/c, pradaxa 150mg BID    4. Coronary Artery Disease. S/P PCI to LAD, LCx.  -stable, Continue ASA 81mg

## 2020-07-30 NOTE — DISCHARGE NOTE PROVIDER - NSDCFUADDINST_GEN_ALL_CORE_FT
Activity as tolerated Activity as tolerated  right lower leg wound - cleanse with sterile water, pat dry, apply acticoat Flex3 Mesh, cover with an abd, secure with an ace wrap every three days  2.) BLE elevation  3.) Emollient therapy: Moisturize intact skin w/ SWEEN cream daily  home care follow up

## 2020-07-30 NOTE — PROGRESS NOTE ADULT - PROBLEM SELECTOR PLAN 4
PAOLO Pinto MD have personally  seen and examined the patient today and have noted the findings and formulated the plan of care.

## 2020-07-30 NOTE — PROGRESS NOTE ADULT - ASSESSMENT
65M with PMH of CAD/MI s/p stents (~2014), T2DM on insulin c/b neuropathy, TIA, HTN, Afib on pradaxa, HFrEF (EF 40-45%) p/w RLE erythema and blisters. Pknown peripheral vascular disease and neuropathy in b/l feet with significantly decreased sensation and pain. admitted for cellulitis and PAD. noticed with cr of 2.7. in 2019 last cr 1.6-1.7 mg/dl    1- ANT on ckd III   2- paraproteinemia   3- hx chf   4- HTN       ant in setting of infection + bactrim use +/- ? over diureses  pt to have repeat spep/ipep and urine bence pinto ? MGUS pt is not anemic  cont bumex 2 mg qd and metolazone 5mg tiw   cont cardizem cd 120 mg qd and toprol   trend   off  bactrim  if for LE angio/and/or CTA then will stand at risk of worsening renal function. this was d/w pt   to hold lasix and metolazone the day before the procedure and gentle ivf hydration   will favor

## 2020-07-30 NOTE — DISCHARGE NOTE PROVIDER - NSDCCPCAREPLAN_GEN_ALL_CORE_FT
PRINCIPAL DISCHARGE DIAGNOSIS  Diagnosis: Cellulitis  Assessment and Plan of Treatment:       SECONDARY DISCHARGE DIAGNOSES  Diagnosis: Venous stasis dermatitis of lower extremity  Assessment and Plan of Treatment: Continue with wound care and follow up outpatient    Diagnosis: Acute renal failure superimposed on stage 3 chronic kidney disease  Assessment and Plan of Treatment: Creatinine stable    Diagnosis: Type 2 diabetes mellitus with diabetic polyneuropathy, with long-term current use of insulin  Assessment and Plan of Treatment: Type 2 diabetes mellitus with diabetic polyneuropathy, with long-term current use of insulin    Diagnosis: Longstanding persistent atrial fibrillation  Assessment and Plan of Treatment: Continue with dabigatran PRINCIPAL DISCHARGE DIAGNOSIS  Diagnosis: Cellulitis of right lower extremity  Assessment and Plan of Treatment: Complete course of intravenous antibiotic therapy      SECONDARY DISCHARGE DIAGNOSES  Diagnosis: Venous stasis dermatitis of lower extremity  Assessment and Plan of Treatment: Continue with wound care and follow up outpatient    Diagnosis: Arterial insufficiency with ischemic ulcer  Assessment and Plan of Treatment: Seen by Vascular------------------    Diagnosis: Venous insufficiency of both lower extremities  Assessment and Plan of Treatment: Seen by vascular---------------------------------    Diagnosis: Heart failure, systolic and diastolic, chronic  Assessment and Plan of Treatment: Continue with ----------------------    Diagnosis: Acute renal failure superimposed on stage 3 chronic kidney disease  Assessment and Plan of Treatment: Creatinine stable    Diagnosis: Type 2 diabetes mellitus with diabetic polyneuropathy, with long-term current use of insulin  Assessment and Plan of Treatment: 7/29: HgbA1c- 9.7. Continue with your Diabetic medication    Diagnosis: Longstanding persistent atrial fibrillation  Assessment and Plan of Treatment: Continue with dabigatran PRINCIPAL DISCHARGE DIAGNOSIS  Diagnosis: Cellulitis of right lower extremity  Assessment and Plan of Treatment: Completed course of intravenous antibiotic therapy      SECONDARY DISCHARGE DIAGNOSES  Diagnosis: Heart failure, systolic and diastolic, chronic  Assessment and Plan of Treatment: Continue medication as prescribed  low salt low cholesterol diet  monitor weight daily and monitor weight gain as discussed    Diagnosis: Arterial insufficiency with ischemic ulcer  Assessment and Plan of Treatment: Seen by Vascular, had right lower extremity angiogram on 8/6/20  continue aspirin/trental and follow up with vascular doctor as outpatient    Diagnosis: Venous insufficiency of both lower extremities  Assessment and Plan of Treatment: Seen by vascular/ wound care    Diagnosis: Acute renal failure superimposed on stage 3 chronic kidney disease  Assessment and Plan of Treatment: seen by renal doctor  avoid kidney toxic medication  folllow up with your doctor in 1 week to check kidney function    Diagnosis: Longstanding persistent atrial fibrillation  Assessment and Plan of Treatment: Continue with dabigatran    Diagnosis: Type 2 diabetes mellitus with diabetic polyneuropathy, with long-term current use of insulin  Assessment and Plan of Treatment: 7/29: HgbA1c- 9.7. Continue with your Diabetic medication    Diagnosis: Venous stasis dermatitis of lower extremity  Assessment and Plan of Treatment: Continue with wound care and follow up outpatient

## 2020-07-30 NOTE — PROGRESS NOTE ADULT - ASSESSMENT
ASSESSMENT/PLAN:  Patient is a 65y old M w/ extensive cardiac hx and DM2 w/ neuropathy, presents w/ RLE cellulitis 2/2 to non-healing wound. Vascular consulted for evaluation of PAD and possible venous insufficiency.   impression  rle wound  may be due from a combo of arterial and venous insuff  there may be a component of AIOD     - Continue wound care per team   - Care per primary team   -will d/w pt rle angio  recommend CTA abd/pelvis

## 2020-07-30 NOTE — DISCHARGE NOTE PROVIDER - HOSPITAL COURSE
Patient is a 65y old M w/ extensive cardiac hx and DM2 w/ neuropathy, presents w/ RLE cellulitis 2/2 to non-healing wound.     per ID -  no cellulitis present - received course of Zosyn    Seen by vascular for venous insufficiency: MARICARMEN/PVR revealed: Mild disease is noted at the left aorto iliac/iliofemoral level. Cannot exclude additional disease at the left popliteal tibial level. Definitive disease at the left trifurcation. The left ankle-brachial index is 0.66. The left digital brachial index is 0.34.        Seen by wound care for Right lower leg wound    Venous dermatitis    Peripheral neuropathy        Recommend:    1.) topical therapy: right lower leg wound - cleanse with sterile water, pat dry, apply acticoat Flex3 Mesh, cover with an abd, secure with an ace wrap every three days    2.) BLE elevation    3.) Emollient therapy: Moisturize intact skin w/ SWEEN cream daily    4.) Regular Podiatry visits for routine diabetic foot and nail care    [facility] [address] [place] [address] [phone] Patient is a 65y old M w/ extensive cardiac hx and DM2 w/ neuropathy, presents w/ RLE cellulitis 2/2 to non-healing wound.     per ID -  no cellulitis present - received course of Zosyn    Seen by vascular for BL LE venous insufficiency: MARICARMEN/PVR revealed: Mild disease is noted at the left aorto iliac/iliofemoral level. Cannot exclude additional disease at the left popliteal tibial level. Definitive disease at the left trifurcation. The left ankle-brachial index is 0.66. The left digital brachial index is 0.34.-----------------------------            Seen by wound care for Right lower leg wound    Venous dermatitis    Peripheral neuropathy        Recommend:    1.) topical therapy: right lower leg wound - cleanse with sterile water, pat dry, apply acticoat Flex3 Mesh, cover with an abd, secure with an ace wrap every three days    2.) BLE elevation    3.) Emollient therapy: Moisturize intact skin w/ SWEEN cream daily    4.) Regular Podiatry visits for routine diabetic foot and nail care    f[facility]u [facility] outpatient at [facility] [address] [facility] [phone] Patient is a 65y old M w/ extensive cardiac hx and DM2 w/ neuropathy, presents w/ RLE cellulitis 2/2 to non-healing wound.     per ID -  no cellulitis present - received course of Zosyn    Seen by vascular for BL LE venous insufficiency: MARICARMEN/PVR revealed: Mild disease is noted at the left aorto iliac/iliofemoral level. Cannot exclude additional disease at the left popliteal tibial level. Definitive disease at the left trifurcation. The left ankle-brachial index is 0.66. The left digital brachial index is 0.34.-----------------------------            Seen by wound care for Right lower leg wound    Venous dermatitis    Peripheral neuropathy        Recommend:    1.) topical therapy: right lower leg wound - cleanse with sterile water, pat dry, apply acticoat Flex3 Mesh, cover with an abd, secure with an ace wrap every three days    2.) BLE elevation    3.) Emollient therapy: Moisturize intact skin w/ SWEEN cream daily    4.) Regular Podiatry visits for routine diabetic foot and nail care    f/u as outpatient at [facility] [address] [facility] [phone]    RLE angiogram by vascular on 8/6/20 Patient is a 65y old M w/ extensive cardiac hx and DM2 w/ neuropathy, presents w/ RLE cellulitis 2/2 to non-healing wound.     per ID -  no cellulitis present - received course of Zosyn    Seen by vascular for BL LE venous insufficiency: MARICARMEN/PVR revealed: Mild disease is noted at the left aorto iliac/iliofemoral level. Cannot exclude additional disease at the left popliteal tibial level. Definitive disease at the left trifurcation. had RLE angiogram on 8/6/20, no surgical intervention at this time. continue with aspirin/trental; follow up with vascular as outpatient.            Seen by wound care for Right lower leg wound    Venous dermatitis    Peripheral neuropathy    1.) topical therapy: right lower leg wound - cleanse with sterile water, pat dry, apply acticoat Flex3 Mesh, cover with an abd, secure with an ace wrap every three days    2.) BLE elevation    3.) Emollient therapy: Moisturize intact skin w/ SWEEN cream daily    4.) Regular Podiatry visits for routine diabetic foot and nail care    f/u as outpatient at [facility] [address] [facility] [phone]        had gouty flare and improved    s/p     solumedrol 10 mg x 1 and colchicine. follow up with primary doctor;    seen by renal and adjusted medication; follow up with MD in 1 week    pt is cleared by attending md for discharge home with home care wound care . Patient is a 65y old M w/ extensive cardiac hx and DM2 w/ neuropathy, presents w/ RLE cellulitis 2/2 to non-healing wound.     per ID -  no cellulitis present - received course of Zosyn    Seen by vascular for BL LE venous insufficiency: MARICARMEN/PVR revealed: Mild disease is noted at the left aorto iliac/iliofemoral level. Cannot exclude additional disease at the left popliteal tibial level. Definitive disease at the left trifurcation. had RLE angiogram on 8/6/20, no surgical intervention at this time. continue medical management; started on trental 400mg po daily. follow up with vascular as outpatient.            Seen by wound care for Right lower leg wound    Venous dermatitis    Peripheral neuropathy    1.) topical therapy: right lower leg wound - cleanse with sterile water, pat dry, apply acticoat Flex3 Mesh, cover with an abd, secure with an ace wrap every three days    2.) BLE elevation    3.) Emollient therapy: Moisturize intact skin w/ SWEEN cream daily    4.) Regular Podiatry visits for routine diabetic foot and nail care    f/u as outpatient at [facility] [address] [facility] [phone]        had gouty flare and improved    s/p     solumedrol 10 mg x 1 and colchicine. follow up with primary doctor;    seen by renal and adjusted medication; follow up with MD in 1 week    pt is cleared by attending md for discharge home with home care wound care .

## 2020-07-30 NOTE — PHYSICAL THERAPY INITIAL EVALUATION ADULT - CRITERIA FOR SKILLED THERAPEUTIC INTERVENTIONS
anticipated discharge recommendation/anticipated equipment needs at discharge/risk reduction/prevention/impairments found/functional limitations in following categories/rehab potential/therapy frequency/predicted duration of therapy intervention

## 2020-07-30 NOTE — DISCHARGE NOTE PROVIDER - NSDCMRMEDTOKEN_GEN_ALL_CORE_FT
aspirin 81 mg oral delayed release tablet: 1 tab(s) orally once a day  Bactrim  mg-160 mg oral tablet: 1 tab(s) orally every 12 hours x 5 days   bumetanide 2 mg oral tablet: 1 tab(s) orally once a day  dilTIAZem 120 mg/24 hours oral capsule, extended release: 1 cap(s) orally once a day  insulin glargine 100 units/mL subcutaneous solution: 25 unit(s) subcutaneous once a day  insulin lispro (concentrated) 200 units/mL subcutaneous solution: 14 unit(s) subcutaneous once a day  insulin lispro (concentrated) 200 units/mL subcutaneous solution: 12  subcutaneous once a day  insulin lispro (concentrated) 200 units/mL subcutaneous solution: 16 unit(s) subcutaneous once a day before dinner  metOLazone 5 mg oral tablet: 1 tab(s) orally 3 times a week, As Needed  metoprolol succinate 50 mg oral tablet, extended release: 1 tab(s) orally 2 times a day  oxycodone-acetaminophen 5 mg-325 mg oral tablet: 1 tab(s) orally once a day, As Needed  Pradaxa 150 mg oral capsule: 1 cap(s) orally 2 times a day  pregabalin 100 mg oral capsule: 1 cap(s) orally 3 times a day aspirin 81 mg oral delayed release tablet: 1 tab(s) orally once a day  bumetanide 2 mg oral tablet: 1 tab(s) orally once a day  dilTIAZem 120 mg/24 hours oral capsule, extended release: 1 cap(s) orally once a day  insulin glargine 100 units/mL subcutaneous solution: 25 unit(s) subcutaneous once a day  insulin lispro (concentrated) 200 units/mL subcutaneous solution: 14 unit(s) subcutaneous once a day  insulin lispro (concentrated) 200 units/mL subcutaneous solution: 12  subcutaneous once a day  insulin lispro (concentrated) 200 units/mL subcutaneous solution: 16 unit(s) subcutaneous once a day before dinner  metoprolol succinate 50 mg oral tablet, extended release: 1 tab(s) orally 2 times a day  oxycodone-acetaminophen 5 mg-325 mg oral tablet: 1 tab(s) orally every 6 hours  pentoxifylline 400 mg oral tablet, extended release: 1 tab(s) orally once a day  polyethylene glycol 3350 oral powder for reconstitution: 17 gram(s) orally once a day  Pradaxa 150 mg oral capsule: 1 cap(s) orally 2 times a day  pregabalin 100 mg oral capsule: 1 cap(s) orally 3 times a day  senna oral tablet: 2 tab(s) orally once a day (at bedtime)

## 2020-07-30 NOTE — PROGRESS NOTE ADULT - ATTENDING COMMENTS
I Edward Pinto MD have personally  seen and examined the patient today and have noted the findings and formulated the plan of care.  I Edward Pinto MD have personally seen and examined the patient at bedside today at  8am

## 2020-07-30 NOTE — PROGRESS NOTE ADULT - ASSESSMENT
65 year old with CAD, DM, Neuropathy TIA, AF, presents with large deep ulcer over the right pretibial area that developed spontaneously as bulla  with history of trauma. No sings of infection on exam but  the area is somewhat red.       I am not sure that this is infected but he clearly has PVD and healing is going to be a problem      hold vanco in view of elevated creat and low likelihood of MRSA.  vascular studies and evaluation   prognosis for limb is guarded   to complete short course of zosyn.  pt refused to let me take down dressing today but no cellulitis present /

## 2020-07-30 NOTE — PHYSICAL THERAPY INITIAL EVALUATION ADULT - PERTINENT HX OF CURRENT PROBLEM, REHAB EVAL
Patient is a 65y old M w/ extensive cardiac hx and DM2 w/ neuropathy, presents w/ RLE cellulitis 2/2 to non-healing wound. pt also with possible venous/arterial LE insufficiency on imaging.

## 2020-07-31 LAB
ANION GAP SERPL CALC-SCNC: 17 MMOL/L — SIGNIFICANT CHANGE UP (ref 5–17)
BUN SERPL-MCNC: 49 MG/DL — HIGH (ref 7–23)
CALCIUM SERPL-MCNC: 9.6 MG/DL — SIGNIFICANT CHANGE UP (ref 8.4–10.5)
CHLORIDE SERPL-SCNC: 92 MMOL/L — LOW (ref 96–108)
CO2 SERPL-SCNC: 26 MMOL/L — SIGNIFICANT CHANGE UP (ref 22–31)
CREAT SERPL-MCNC: 2.79 MG/DL — HIGH (ref 0.5–1.3)
GLUCOSE BLDC GLUCOMTR-MCNC: 176 MG/DL — HIGH (ref 70–99)
GLUCOSE BLDC GLUCOMTR-MCNC: 220 MG/DL — HIGH (ref 70–99)
GLUCOSE BLDC GLUCOMTR-MCNC: 281 MG/DL — HIGH (ref 70–99)
GLUCOSE BLDC GLUCOMTR-MCNC: 385 MG/DL — HIGH (ref 70–99)
GLUCOSE SERPL-MCNC: 160 MG/DL — HIGH (ref 70–99)
HCT VFR BLD CALC: 47.3 % — SIGNIFICANT CHANGE UP (ref 39–50)
HGB BLD-MCNC: 14.2 G/DL — SIGNIFICANT CHANGE UP (ref 13–17)
MCHC RBC-ENTMCNC: 24.5 PG — LOW (ref 27–34)
MCHC RBC-ENTMCNC: 30 GM/DL — LOW (ref 32–36)
MCV RBC AUTO: 81.6 FL — SIGNIFICANT CHANGE UP (ref 80–100)
NRBC # BLD: 0 /100 WBCS — SIGNIFICANT CHANGE UP (ref 0–0)
PLATELET # BLD AUTO: 249 K/UL — SIGNIFICANT CHANGE UP (ref 150–400)
POTASSIUM SERPL-MCNC: 4.6 MMOL/L — SIGNIFICANT CHANGE UP (ref 3.5–5.3)
POTASSIUM SERPL-SCNC: 4.6 MMOL/L — SIGNIFICANT CHANGE UP (ref 3.5–5.3)
RBC # BLD: 5.8 M/UL — SIGNIFICANT CHANGE UP (ref 4.2–5.8)
RBC # FLD: 17.2 % — HIGH (ref 10.3–14.5)
SODIUM SERPL-SCNC: 135 MMOL/L — SIGNIFICANT CHANGE UP (ref 135–145)
VANCOMYCIN FLD-MCNC: <4 UG/ML — SIGNIFICANT CHANGE UP
WBC # BLD: 13.95 K/UL — HIGH (ref 3.8–10.5)
WBC # FLD AUTO: 13.95 K/UL — HIGH (ref 3.8–10.5)

## 2020-07-31 PROCEDURE — 99232 SBSQ HOSP IP/OBS MODERATE 35: CPT

## 2020-07-31 RX ORDER — VANCOMYCIN HCL 1 G
1000 VIAL (EA) INTRAVENOUS ONCE
Refills: 0 | Status: COMPLETED | OUTPATIENT
Start: 2020-07-31 | End: 2020-07-31

## 2020-07-31 RX ADMIN — Medication 3: at 22:05

## 2020-07-31 RX ADMIN — Medication 650 MILLIGRAM(S): at 06:00

## 2020-07-31 RX ADMIN — INSULIN GLARGINE 25 UNIT(S): 100 INJECTION, SOLUTION SUBCUTANEOUS at 22:05

## 2020-07-31 RX ADMIN — Medication 120 MILLIGRAM(S): at 06:01

## 2020-07-31 RX ADMIN — OXYCODONE HYDROCHLORIDE 5 MILLIGRAM(S): 5 TABLET ORAL at 01:08

## 2020-07-31 RX ADMIN — Medication 14 UNIT(S): at 13:19

## 2020-07-31 RX ADMIN — Medication 50 MILLIGRAM(S): at 06:01

## 2020-07-31 RX ADMIN — Medication 50 MILLIGRAM(S): at 17:31

## 2020-07-31 RX ADMIN — OXYCODONE HYDROCHLORIDE 5 MILLIGRAM(S): 5 TABLET ORAL at 17:34

## 2020-07-31 RX ADMIN — DABIGATRAN ETEXILATE MESYLATE 150 MILLIGRAM(S): 150 CAPSULE ORAL at 06:01

## 2020-07-31 RX ADMIN — OXYCODONE HYDROCHLORIDE 5 MILLIGRAM(S): 5 TABLET ORAL at 01:38

## 2020-07-31 RX ADMIN — Medication 10 MILLIGRAM(S): at 12:33

## 2020-07-31 RX ADMIN — Medication 100 MILLIGRAM(S): at 13:19

## 2020-07-31 RX ADMIN — OXYCODONE HYDROCHLORIDE 5 MILLIGRAM(S): 5 TABLET ORAL at 18:12

## 2020-07-31 RX ADMIN — OXYCODONE HYDROCHLORIDE 5 MILLIGRAM(S): 5 TABLET ORAL at 08:47

## 2020-07-31 RX ADMIN — Medication 250 MILLIGRAM(S): at 17:30

## 2020-07-31 RX ADMIN — SENNA PLUS 2 TABLET(S): 8.6 TABLET ORAL at 22:06

## 2020-07-31 RX ADMIN — PIPERACILLIN AND TAZOBACTAM 25 GRAM(S): 4; .5 INJECTION, POWDER, LYOPHILIZED, FOR SOLUTION INTRAVENOUS at 13:19

## 2020-07-31 RX ADMIN — PIPERACILLIN AND TAZOBACTAM 25 GRAM(S): 4; .5 INJECTION, POWDER, LYOPHILIZED, FOR SOLUTION INTRAVENOUS at 06:01

## 2020-07-31 RX ADMIN — Medication 2: at 13:18

## 2020-07-31 RX ADMIN — BUMETANIDE 2 MILLIGRAM(S): 0.25 INJECTION INTRAMUSCULAR; INTRAVENOUS at 06:00

## 2020-07-31 RX ADMIN — OXYCODONE HYDROCHLORIDE 5 MILLIGRAM(S): 5 TABLET ORAL at 09:45

## 2020-07-31 RX ADMIN — Medication 81 MILLIGRAM(S): at 12:33

## 2020-07-31 RX ADMIN — Medication 650 MILLIGRAM(S): at 06:30

## 2020-07-31 RX ADMIN — PIPERACILLIN AND TAZOBACTAM 25 GRAM(S): 4; .5 INJECTION, POWDER, LYOPHILIZED, FOR SOLUTION INTRAVENOUS at 22:06

## 2020-07-31 RX ADMIN — Medication 12 UNIT(S): at 08:47

## 2020-07-31 RX ADMIN — Medication 650 MILLIGRAM(S): at 12:33

## 2020-07-31 RX ADMIN — Medication 100 MILLIGRAM(S): at 06:01

## 2020-07-31 RX ADMIN — Medication 650 MILLIGRAM(S): at 13:17

## 2020-07-31 RX ADMIN — DABIGATRAN ETEXILATE MESYLATE 150 MILLIGRAM(S): 150 CAPSULE ORAL at 17:31

## 2020-07-31 RX ADMIN — Medication 3: at 17:31

## 2020-07-31 RX ADMIN — Medication 16 UNIT(S): at 17:32

## 2020-07-31 RX ADMIN — POLYETHYLENE GLYCOL 3350 17 GRAM(S): 17 POWDER, FOR SOLUTION ORAL at 12:33

## 2020-07-31 RX ADMIN — Medication 100 MILLIGRAM(S): at 22:06

## 2020-07-31 NOTE — DIETITIAN INITIAL EVALUATION ADULT. - PERTINENT LABORATORY DATA
07-31 Glu 160 mg/dL<H> K+ 4.6 mmol/L Cr  2.79 mg/dL<H> BUN 49 mg/dL<H>   HgA1c 9.7%  POCT Blood Glucose.: 176 mg/dL (31 Jul 2020 08:00)  POCT Blood Glucose.: 161 mg/dL (30 Jul 2020 21:54)  POCT Blood Glucose.: 225 mg/dL (30 Jul 2020 17:03)  POCT Blood Glucose.: 126 mg/dL (30 Jul 2020 12:41)

## 2020-07-31 NOTE — PROGRESS NOTE ADULT - ATTENDING COMMENTS
agree with the above assessment and plan by JOYCE Mc.  CV status remains stable  cont current cardiac mgmt  abx per primary team

## 2020-07-31 NOTE — PROGRESS NOTE ADULT - ASSESSMENT
65M with PMH of CAD/MI s/p stents (~2014), T2DM on insulin c/b neuropathy, TIA, HTN, Afib on pradaxa, HFrEF (EF 40-45%) p/w RLE erythema and blisters, a/w cellulitis that has failed output abx therapy.      Problem/Plan - 1:  ·  Problem: Cellulitis of right lower extremity.    iv abs as per id  wound care  f/u culture    vascular f/u noted  refusing angio of ext at this time  carmen/dopplers nored  tylenol, oxycodone prn for pain control.   pletal if ok w/ cards     Problem/Plan - 2:  ·  Problem: ANT (acute kidney injury).    renal eval noted  monitor Cr, avoid nephrotoxins, renally dose medications.      Problem/Plan - 3:  hx atrial fibrillation.   c/w meds  c/w pradaxa for AC   cards eval noted   Problem/Plan - 4:  ·  Problem: Constipation  c/w senna and miralax.      Problem/Plan - 5:  ·  Problem: Chronic systolic heart failure.  Plan: chronic HF with last TTE showing EF 40-45%  c/w bumex 2mg QD and metolazone 3x/week   c/w metoprolol   monitor Cr.      Problem/Plan - 6:  Problem: Type 2 diabetes mellitus with diabetic polyneuropathy, with long-term current use of insulin.   c/w home regimen of insulin - lantus 25units qhs and lispro 12/14/16 TID with meals  ISS and monitor FS ac and hs  c/w lyrica for neuropathy.     Problem/Plan - 7:  ·  Problem: Essential hypertension.  Plan: BP at goal, stable   c/w home antiHTN meds       Problem/Plan - 8:  ·  Problem: CAD (coronary artery disease).  Plan: MI/CAD s/p stents, last ~2014. No CP, EKG without ischemic changes   c/w ASA     gouty flare  solumedrol 10 mg x 1

## 2020-07-31 NOTE — CHART NOTE - NSCHARTNOTEFT_GEN_A_CORE
Upon Nutritional Assessment by the Registered Dietitian your patient was determined to meet criteria / has evidence of the following diagnosis/diagnoses:          [ ]  Mild Protein Calorie Malnutrition        [ ]  Moderate Protein Calorie Malnutrition        [ ] Severe Protein Calorie Malnutrition        [ ] Unspecified Protein Calorie Malnutrition        [ ] Underweight / BMI <19        [x] Morbid Obesity / BMI > 40      Findings as based on:  [x] Comprehensive nutrition assessment BMI 40.2  [ ] Nutrition Focused Physical Exam  [ ] Other:       Nutrition Plan/Recommendations:    1. Continue consistent carbohydrate, DASH/TLC diet.  2. Provided T2DM and heart failure diet education, handouts provided, RD remains available to reinforce PRN.  3. Consider vitamin C to promote wound healing.      RD remains available.    Christine Hubbard RD, #[phone]     PROVIDER Section:     By signing this assessment you are acknowledging and agree with the diagnosis/diagnoses assigned by the Registered Dietitian    Comments:

## 2020-07-31 NOTE — PROGRESS NOTE ADULT - ASSESSMENT
65 year old man with PMH of CAD/MI s/p PCI 2014, T2DM on insulin c/b neuropathy, TIA, HTN, Afib on pradaxa, HFrEF (EF 40-45%) p/w RLE erythema and blisters, Dx of RLE cellulitis.     1. RLE Cellulitis   -IV abx per ID  -wound care f/u  -vasc f/u  -pain management  -venous, arterial leg studies    2. ANT/CKD  -renal f/u  -continue diuretics as ordered    3. Chronic systolic and diastolic heart failure  -volume status stable  -continue bumex daily as ordered with metol for now as ordered  -cont to monitor creat    3. Atrial Fibrillation, chronic   -rates stable   -c/w metoprolol succinate ER 50mg BID   -c/w cardizem  mg daily    -a/c, pradaxa 150mg BID    4. Coronary Artery Disease. S/P PCI to LAD, LCx.  -stable, Continue ASA 81mg     5. Gout  steroids per primary team

## 2020-07-31 NOTE — CONSULT NOTE ADULT - SUBJECTIVE AND OBJECTIVE BOX
----------------------------------------------------------  Interventional Radiology Brief Consult Note  -----------------------------------------------------------    Reason for Referral:     Clinical Summary:  Patient is a 65y old M w/ extensive cardiac hx and DM2 w/ neuropathy, presents w/ RLE cellulitis 2/2 to non-healing wound.   IR consulted for possible CO2 angiography of the RLE given patient's elevated Cr.     Vitals:  T(C): 37 (07-31-20 @ 05:57), Max: 37.2 (07-30-20 @ 19:45)  HR: 96 (07-31-20 @ 05:57) (72 - 97)  BP: 136/74 (07-31-20 @ 05:57) (121/52 - 136/74)  RR: 18 (07-31-20 @ 05:57) (17 - 18)  SpO2: 95% (07-31-20 @ 05:57) (94% - 99%)    Labs:           14.2  13.95)-----(249     (07-31-20 @ 06:28)         47.3     135 | 92 | 49  ----------------------< 160     (07-31-20 @ 06:28)  4.6 | 26 | 2.79         Assessment: Patient is a 65y old M w/ extensive cardiac hx and DM2 w/ neuropathy, presents w/ RLE cellulitis 2/2 to non-healing wound.   IR consulted for possible CO2 angiography of the RLE given patient's elevated Cr.     MARICARMEN/PVRs and imaging reviewed with IR attending.  Patient's level of disease is below the knee on the right. CO2 angiography would be limited in evaluating smaller vessels below the knee.     -no IR intervention at this time.  -continue management of RLE ulcer and PAD per vascular surgery.

## 2020-07-31 NOTE — PROGRESS NOTE ADULT - ASSESSMENT
65M with PMH of CAD/MI s/p stents (~2014), T2DM on insulin c/b neuropathy, TIA, HTN, Afib on pradaxa, HFrEF (EF 40-45%) p/w RLE erythema and blisters. Pknown peripheral vascular disease and neuropathy in b/l feet with significantly decreased sensation and pain. admitted for cellulitis and PAD. noticed with cr of 2.7. in 2019 last cr 1.6-1.7 mg/dl    1- ANT on ckd III   2- paraproteinemia   3- hx chf   4- HTN       ant in setting of infection + bactrim use +/- ? over diureses  pt to have repeat spep/ipep and urine bence pinto ? MGUS pt is not anemic  cont bumex 2 mg qd and hold metolazone for 1 day. he is not in chf and cr is higher  cont cardizem cd 120 mg qd and toprol   trend   off  bactrim  if for LE angio/and/or CTA then will stand at risk of worsening renal function. this was d/w pt   to hold lasix and metolazone the day before the procedure and gentle ivf hydration   d/w Dr. Pinto as well

## 2020-07-31 NOTE — PROGRESS NOTE ADULT - ASSESSMENT
65 year old with CAD, DM, Neuropathy TIA, AF, presents with large deep ulcer over the right pretibial area that developed spontaneously as bulla  with history of trauma.   Erythema  Discharge  Also stasis on contralateral leg  ? vascular ulcer with progression due to poor circulation  ?above with superinfection  Patient hemodynamically stable-no other s/s infection but has elevated WBC  Vascular on case-input noted  Rec:  A) leg ulcer  continue monitoring  vascular follow up-pt refusing intervention  If continues to worsen may also need imaging to r/o deeper foci  continue empiric abx as above-vanco being dosed by levels-unclear if needed but no cx to guide      B) leucocytosis  ? due to above  trend and monitor    Will tailor plan for ID issues  per course,results.Will defer to primary team on management of other issues.  Assessment, plan and recommendations as detailed above were discussed with the medical/primary  team.  Infectious Diseases Service will cover over weekend.  Please call [phone] if issues

## 2020-07-31 NOTE — DIETITIAN INITIAL EVALUATION ADULT. - OTHER INFO
Information obtained from pt and EMR. Pt reports good PO intake PTA, consumes 3 meals daily, states he monitors his sugar and starch intake, somewhat monitors sodium intake. Pt states he has home garden and consumes vegetables often. Pt reports some intentional weight loss, states he was previously 295 pounds - noted pt with dosing weight of 288 pounds, stated. Noted per previous RD note pt with weight of 278.2 in November 2019. Pt endorses intake of vitamin D. NKFA. No chewing/swallowing issues.    Pt with HgA1c of 9.7% suggesting poor glycemic control. Pt reports use of insulin at home. Pt states he checks his FS 3 x daily with values ~200. States "good" value for him is 140. Does not report any hypoglycemic episodes. Pt with hx of HF - states he weighs himself daily.    Denies any N+V, +constipation, last BM 7/28 after enema. Pt endorses fair to good PO intake, dislikes most of food but tolerates it. Provided verbal diet education on T2DM nutrition therapy and heart failure nutrition therapy. Handouts provided, pt receptive.

## 2020-07-31 NOTE — DIETITIAN INITIAL EVALUATION ADULT. - REASON INDICATOR FOR ASSESSMENT
Assessment warranted for BMI>40. Per chart: pt is a 65M with PMH of CAD/MI s/p stents (~2014), T2DM on insulin c/b neuropathy, TIA, HTN, Afib on pradaxa, HFrEF (EF 40-45%) p/w RLE erythema and blisters, a/w cellulitis that has failed output abx therapy.

## 2020-07-31 NOTE — DIETITIAN INITIAL EVALUATION ADULT. - PHYSICAL APPEARANCE
other (specify)/obese Pt appears well nourished. Per RN assessment pt with no noted pressure injuries, + leg wound. Per flow sheets pt with 2+ bilateral leg edema.  Ht: 71 inches Wt: 288 pounds BMI: 40.2 kg/m2 IBW: 172 pound (+/-10%) %IBW: 167%

## 2020-07-31 NOTE — PROGRESS NOTE ADULT - ASSESSMENT
ASSESSMENT/PLAN:  Patient is a 65y old M w/ extensive cardiac hx and DM2 w/ neuropathy, presents w/ RLE cellulitis 2/2 to non-healing wound. Vascular consulted for evaluation of PAD and possible venous insufficiency.   impression  rle wound  may be due from a combo of arterial and venous insuff  there may be a component of AIOD     - d/w pt risks and benfits of co2 angio and cta   pt refuses at this time any study which involves iv contrast  i explained to pt if rle wound deteriorates there is a risk of rle foot and or limb loss  pt states that I am too negative  therefore I recommend if there are no card contraindications pletal 50 bid  if there are cardiac contraindicat then recommend trental 400 tid  will d/w pt's daughter this also   will follow

## 2020-07-31 NOTE — CONSULT NOTE ADULT - CONSULT REQUESTED DATE/TIME
29-Jul-2020 09:42
29-Jul-2020
29-Jul-2020 11:08
29-Jul-2020 11:28
31-Jul-2020 12:26
29-Jul-2020 23:14

## 2020-07-31 NOTE — PROGRESS NOTE ADULT - ATTENDING COMMENTS
I Edward Pinto MD have personally  seen and examined the patient today and have noted the findings and formulated the plan of care.  I Edward Pinto MD have personally seen and examined the patient at bedside today at  10am

## 2020-07-31 NOTE — CONSULT NOTE ADULT - CONSULT REASON
RLE non-healing wound
RLE cellulitis
cellulits
eval for RLE CO2 angiography for nonhealing ulcer
rt leg wounds
abn creatinine

## 2020-08-01 LAB
ANION GAP SERPL CALC-SCNC: 17 MMOL/L — SIGNIFICANT CHANGE UP (ref 5–17)
BUN SERPL-MCNC: 56 MG/DL — HIGH (ref 7–23)
CALCIUM SERPL-MCNC: 9.5 MG/DL — SIGNIFICANT CHANGE UP (ref 8.4–10.5)
CHLORIDE SERPL-SCNC: 90 MMOL/L — LOW (ref 96–108)
CO2 SERPL-SCNC: 24 MMOL/L — SIGNIFICANT CHANGE UP (ref 22–31)
CREAT SERPL-MCNC: 2.64 MG/DL — HIGH (ref 0.5–1.3)
GLUCOSE BLDC GLUCOMTR-MCNC: 252 MG/DL — HIGH (ref 70–99)
GLUCOSE BLDC GLUCOMTR-MCNC: 267 MG/DL — HIGH (ref 70–99)
GLUCOSE BLDC GLUCOMTR-MCNC: 293 MG/DL — HIGH (ref 70–99)
GLUCOSE BLDC GLUCOMTR-MCNC: 295 MG/DL — HIGH (ref 70–99)
GLUCOSE SERPL-MCNC: 333 MG/DL — HIGH (ref 70–99)
HCT VFR BLD CALC: 45.8 % — SIGNIFICANT CHANGE UP (ref 39–50)
HGB BLD-MCNC: 13.9 G/DL — SIGNIFICANT CHANGE UP (ref 13–17)
MCHC RBC-ENTMCNC: 24.3 PG — LOW (ref 27–34)
MCHC RBC-ENTMCNC: 30.3 GM/DL — LOW (ref 32–36)
MCV RBC AUTO: 80.2 FL — SIGNIFICANT CHANGE UP (ref 80–100)
NRBC # BLD: 0 /100 WBCS — SIGNIFICANT CHANGE UP (ref 0–0)
PLATELET # BLD AUTO: 265 K/UL — SIGNIFICANT CHANGE UP (ref 150–400)
POTASSIUM SERPL-MCNC: 4.2 MMOL/L — SIGNIFICANT CHANGE UP (ref 3.5–5.3)
POTASSIUM SERPL-SCNC: 4.2 MMOL/L — SIGNIFICANT CHANGE UP (ref 3.5–5.3)
RBC # BLD: 5.71 M/UL — SIGNIFICANT CHANGE UP (ref 4.2–5.8)
RBC # FLD: 16.9 % — HIGH (ref 10.3–14.5)
SODIUM SERPL-SCNC: 131 MMOL/L — LOW (ref 135–145)
VANCOMYCIN TROUGH SERPL-MCNC: 7.7 UG/ML — LOW (ref 10–20)
WBC # BLD: 15.87 K/UL — HIGH (ref 3.8–10.5)
WBC # FLD AUTO: 15.87 K/UL — HIGH (ref 3.8–10.5)

## 2020-08-01 RX ORDER — CILOSTAZOL 100 MG/1
50 TABLET ORAL
Refills: 0 | Status: DISCONTINUED | OUTPATIENT
Start: 2020-08-01 | End: 2020-08-07

## 2020-08-01 RX ADMIN — OXYCODONE HYDROCHLORIDE 5 MILLIGRAM(S): 5 TABLET ORAL at 06:35

## 2020-08-01 RX ADMIN — SENNA PLUS 2 TABLET(S): 8.6 TABLET ORAL at 21:55

## 2020-08-01 RX ADMIN — PIPERACILLIN AND TAZOBACTAM 25 GRAM(S): 4; .5 INJECTION, POWDER, LYOPHILIZED, FOR SOLUTION INTRAVENOUS at 14:42

## 2020-08-01 RX ADMIN — OXYCODONE HYDROCHLORIDE 5 MILLIGRAM(S): 5 TABLET ORAL at 13:50

## 2020-08-01 RX ADMIN — Medication 1: at 22:36

## 2020-08-01 RX ADMIN — OXYCODONE HYDROCHLORIDE 5 MILLIGRAM(S): 5 TABLET ORAL at 20:28

## 2020-08-01 RX ADMIN — Medication 100 MILLIGRAM(S): at 13:02

## 2020-08-01 RX ADMIN — OXYCODONE HYDROCHLORIDE 5 MILLIGRAM(S): 5 TABLET ORAL at 13:02

## 2020-08-01 RX ADMIN — Medication 3: at 17:31

## 2020-08-01 RX ADMIN — DABIGATRAN ETEXILATE MESYLATE 150 MILLIGRAM(S): 150 CAPSULE ORAL at 17:32

## 2020-08-01 RX ADMIN — PIPERACILLIN AND TAZOBACTAM 25 GRAM(S): 4; .5 INJECTION, POWDER, LYOPHILIZED, FOR SOLUTION INTRAVENOUS at 21:55

## 2020-08-01 RX ADMIN — Medication 16 UNIT(S): at 17:31

## 2020-08-01 RX ADMIN — Medication 3: at 08:55

## 2020-08-01 RX ADMIN — Medication 14 UNIT(S): at 13:01

## 2020-08-01 RX ADMIN — Medication 50 MILLIGRAM(S): at 17:32

## 2020-08-01 RX ADMIN — BUMETANIDE 2 MILLIGRAM(S): 0.25 INJECTION INTRAMUSCULAR; INTRAVENOUS at 06:35

## 2020-08-01 RX ADMIN — DABIGATRAN ETEXILATE MESYLATE 150 MILLIGRAM(S): 150 CAPSULE ORAL at 06:35

## 2020-08-01 RX ADMIN — OXYCODONE HYDROCHLORIDE 5 MILLIGRAM(S): 5 TABLET ORAL at 00:19

## 2020-08-01 RX ADMIN — Medication 50 MILLIGRAM(S): at 06:36

## 2020-08-01 RX ADMIN — Medication 12 UNIT(S): at 08:55

## 2020-08-01 RX ADMIN — PIPERACILLIN AND TAZOBACTAM 25 GRAM(S): 4; .5 INJECTION, POWDER, LYOPHILIZED, FOR SOLUTION INTRAVENOUS at 06:34

## 2020-08-01 RX ADMIN — INSULIN GLARGINE 25 UNIT(S): 100 INJECTION, SOLUTION SUBCUTANEOUS at 22:36

## 2020-08-01 RX ADMIN — POLYETHYLENE GLYCOL 3350 17 GRAM(S): 17 POWDER, FOR SOLUTION ORAL at 13:02

## 2020-08-01 RX ADMIN — Medication 81 MILLIGRAM(S): at 13:01

## 2020-08-01 RX ADMIN — CILOSTAZOL 50 MILLIGRAM(S): 100 TABLET ORAL at 23:10

## 2020-08-01 RX ADMIN — Medication 3: at 13:01

## 2020-08-01 RX ADMIN — OXYCODONE HYDROCHLORIDE 5 MILLIGRAM(S): 5 TABLET ORAL at 20:50

## 2020-08-01 RX ADMIN — Medication 100 MILLIGRAM(S): at 21:55

## 2020-08-01 RX ADMIN — OXYCODONE HYDROCHLORIDE 5 MILLIGRAM(S): 5 TABLET ORAL at 00:49

## 2020-08-01 RX ADMIN — Medication 100 MILLIGRAM(S): at 06:36

## 2020-08-01 RX ADMIN — Medication 120 MILLIGRAM(S): at 06:35

## 2020-08-01 RX ADMIN — OXYCODONE HYDROCHLORIDE 5 MILLIGRAM(S): 5 TABLET ORAL at 07:05

## 2020-08-01 NOTE — PROGRESS NOTE ADULT - ASSESSMENT
65M with PMH of CAD/MI s/p stents (~2014), T2DM on insulin c/b neuropathy, TIA, HTN, Afib on pradaxa, HFrEF (EF 40-45%) p/w RLE erythema and blisters, a/w cellulitis that has failed output abx therapy.      Problem/Plan - 1:  ·  Problem: Cellulitis of right lower extremity.    iv abs as per id  wound care   vascular f/u noted  refusing angio of ext at this time  carmen/dopplers noted  tylenol, oxycodone prn for pain control.   pletal trial     Problem/Plan - 2:  ·  Problem: ANT (acute kidney injury).    renal f/u noted  monitor Cr, avoid nephrotoxins, renally dose medications.      Problem/Plan - 3:  hx atrial fibrillation.   c/w meds  c/w pradaxa for AC   cards eval noted   Problem/Plan - 4:  ·  Problem: Constipation  c/w senna and miralax.      Problem/Plan - 5:  ·  Problem: Chronic systolic heart failure.  Plan: chronic HF with last TTE showing EF 40-45%  c/w bumex 2mg QD and metolazone 3x/week   c/w metoprolol   monitor Cr.      Problem/Plan - 6:  Problem: Type 2 diabetes mellitus with diabetic polyneuropathy, with long-term current use of insulin.   c/w home regimen of insulin - lantus 25units qhs and lispro 12/14/16 TID with meals  ISS and monitor FS ac and hs  adjust for inc glucose levels  c/w lyrica for neuropathy.     Problem/Plan - 7:  ·  Problem: Essential hypertension.  Plan: BP at goal, stable   c/w home antiHTN meds       Problem/Plan - 8:  ·  Problem: CAD (coronary artery disease).  Plan: MI/CAD s/p stents, last ~2014. No CP, EKG without ischemic changes   c/w ASA     gouty flare  solumedrol 10 mg x 1 yestreday w/ improvement

## 2020-08-01 NOTE — PROGRESS NOTE ADULT - ASSESSMENT
65 year old man with PMH of CAD/MI s/p PCI 2014, T2DM on insulin c/b neuropathy, TIA, HTN, Afib on pradaxa, HFrEF (EF 40-45%) p/w RLE erythema and blisters, Dx of RLE cellulitis.     1. RLE Cellulitis   -IV abx per ID  -wound care f/u  -vasc f/u  -pain management  -can try pletal for pad, watch for worsening clinical hf    2. ANT/CKD  -renal f/u  -creat stable  -continue bumex daily/metol as ordered    3. Chronic systolic and diastolic heart failure  -volume status stable  -continue bumex daily as ordered with metol  -cont to monitor creat    4. Atrial Fibrillation, chronic   -rates stable   -c/w metoprolol succinate ER 50mg BID   -c/w cardizem  mg daily    -a/c, pradaxa 150mg BID    5. Coronary Artery Disease. S/P PCI to LAD, LCx.  -stable, Continue ASA 81mg     6. Gout  steroids per primary team

## 2020-08-02 LAB
ANION GAP SERPL CALC-SCNC: 18 MMOL/L — HIGH (ref 5–17)
BASOPHILS # BLD AUTO: 0.06 K/UL — SIGNIFICANT CHANGE UP (ref 0–0.2)
BASOPHILS NFR BLD AUTO: 0.5 % — SIGNIFICANT CHANGE UP (ref 0–2)
BUN SERPL-MCNC: 64 MG/DL — HIGH (ref 7–23)
CALCIUM SERPL-MCNC: 9.5 MG/DL — SIGNIFICANT CHANGE UP (ref 8.4–10.5)
CHLORIDE SERPL-SCNC: 92 MMOL/L — LOW (ref 96–108)
CO2 SERPL-SCNC: 24 MMOL/L — SIGNIFICANT CHANGE UP (ref 22–31)
CREAT SERPL-MCNC: 2.83 MG/DL — HIGH (ref 0.5–1.3)
EOSINOPHIL # BLD AUTO: 0.15 K/UL — SIGNIFICANT CHANGE UP (ref 0–0.5)
EOSINOPHIL NFR BLD AUTO: 1.2 % — SIGNIFICANT CHANGE UP (ref 0–6)
GLUCOSE BLDC GLUCOMTR-MCNC: 248 MG/DL — HIGH (ref 70–99)
GLUCOSE BLDC GLUCOMTR-MCNC: 255 MG/DL — HIGH (ref 70–99)
GLUCOSE BLDC GLUCOMTR-MCNC: 257 MG/DL — HIGH (ref 70–99)
GLUCOSE BLDC GLUCOMTR-MCNC: 310 MG/DL — HIGH (ref 70–99)
GLUCOSE SERPL-MCNC: 234 MG/DL — HIGH (ref 70–99)
HCT VFR BLD CALC: 44 % — SIGNIFICANT CHANGE UP (ref 39–50)
HGB BLD-MCNC: 13.2 G/DL — SIGNIFICANT CHANGE UP (ref 13–17)
IMM GRANULOCYTES NFR BLD AUTO: 0.7 % — SIGNIFICANT CHANGE UP (ref 0–1.5)
LYMPHOCYTES # BLD AUTO: 15.9 % — SIGNIFICANT CHANGE UP (ref 13–44)
LYMPHOCYTES # BLD AUTO: 2 K/UL — SIGNIFICANT CHANGE UP (ref 1–3.3)
MCHC RBC-ENTMCNC: 24.4 PG — LOW (ref 27–34)
MCHC RBC-ENTMCNC: 30 GM/DL — LOW (ref 32–36)
MCV RBC AUTO: 81.3 FL — SIGNIFICANT CHANGE UP (ref 80–100)
MONOCYTES # BLD AUTO: 1.16 K/UL — HIGH (ref 0–0.9)
MONOCYTES NFR BLD AUTO: 9.2 % — SIGNIFICANT CHANGE UP (ref 2–14)
NEUTROPHILS # BLD AUTO: 9.14 K/UL — HIGH (ref 1.8–7.4)
NEUTROPHILS NFR BLD AUTO: 72.5 % — SIGNIFICANT CHANGE UP (ref 43–77)
NRBC # BLD: 0 /100 WBCS — SIGNIFICANT CHANGE UP (ref 0–0)
PLATELET # BLD AUTO: 271 K/UL — SIGNIFICANT CHANGE UP (ref 150–400)
POTASSIUM SERPL-MCNC: 4.1 MMOL/L — SIGNIFICANT CHANGE UP (ref 3.5–5.3)
POTASSIUM SERPL-SCNC: 4.1 MMOL/L — SIGNIFICANT CHANGE UP (ref 3.5–5.3)
RBC # BLD: 5.41 M/UL — SIGNIFICANT CHANGE UP (ref 4.2–5.8)
RBC # FLD: 16.9 % — HIGH (ref 10.3–14.5)
SODIUM SERPL-SCNC: 134 MMOL/L — LOW (ref 135–145)
WBC # BLD: 12.6 K/UL — HIGH (ref 3.8–10.5)
WBC # FLD AUTO: 12.6 K/UL — HIGH (ref 3.8–10.5)

## 2020-08-02 PROCEDURE — 99232 SBSQ HOSP IP/OBS MODERATE 35: CPT

## 2020-08-02 RX ADMIN — Medication 100 MILLIGRAM(S): at 21:14

## 2020-08-02 RX ADMIN — Medication 120 MILLIGRAM(S): at 05:07

## 2020-08-02 RX ADMIN — PIPERACILLIN AND TAZOBACTAM 25 GRAM(S): 4; .5 INJECTION, POWDER, LYOPHILIZED, FOR SOLUTION INTRAVENOUS at 14:43

## 2020-08-02 RX ADMIN — POLYETHYLENE GLYCOL 3350 17 GRAM(S): 17 POWDER, FOR SOLUTION ORAL at 12:47

## 2020-08-02 RX ADMIN — CILOSTAZOL 50 MILLIGRAM(S): 100 TABLET ORAL at 10:27

## 2020-08-02 RX ADMIN — OXYCODONE HYDROCHLORIDE 5 MILLIGRAM(S): 5 TABLET ORAL at 14:43

## 2020-08-02 RX ADMIN — DABIGATRAN ETEXILATE MESYLATE 150 MILLIGRAM(S): 150 CAPSULE ORAL at 05:07

## 2020-08-02 RX ADMIN — Medication 50 MILLIGRAM(S): at 05:07

## 2020-08-02 RX ADMIN — DABIGATRAN ETEXILATE MESYLATE 150 MILLIGRAM(S): 150 CAPSULE ORAL at 17:30

## 2020-08-02 RX ADMIN — CILOSTAZOL 50 MILLIGRAM(S): 100 TABLET ORAL at 21:14

## 2020-08-02 RX ADMIN — Medication 14 UNIT(S): at 12:47

## 2020-08-02 RX ADMIN — Medication 81 MILLIGRAM(S): at 12:47

## 2020-08-02 RX ADMIN — OXYCODONE HYDROCHLORIDE 5 MILLIGRAM(S): 5 TABLET ORAL at 06:00

## 2020-08-02 RX ADMIN — Medication 4: at 12:47

## 2020-08-02 RX ADMIN — PIPERACILLIN AND TAZOBACTAM 25 GRAM(S): 4; .5 INJECTION, POWDER, LYOPHILIZED, FOR SOLUTION INTRAVENOUS at 21:13

## 2020-08-02 RX ADMIN — INSULIN GLARGINE 25 UNIT(S): 100 INJECTION, SOLUTION SUBCUTANEOUS at 22:02

## 2020-08-02 RX ADMIN — OXYCODONE HYDROCHLORIDE 5 MILLIGRAM(S): 5 TABLET ORAL at 22:30

## 2020-08-02 RX ADMIN — SENNA PLUS 2 TABLET(S): 8.6 TABLET ORAL at 21:14

## 2020-08-02 RX ADMIN — Medication 16 UNIT(S): at 17:29

## 2020-08-02 RX ADMIN — OXYCODONE HYDROCHLORIDE 5 MILLIGRAM(S): 5 TABLET ORAL at 05:32

## 2020-08-02 RX ADMIN — Medication 3: at 08:43

## 2020-08-02 RX ADMIN — BUMETANIDE 2 MILLIGRAM(S): 0.25 INJECTION INTRAMUSCULAR; INTRAVENOUS at 05:07

## 2020-08-02 RX ADMIN — Medication 1: at 22:02

## 2020-08-02 RX ADMIN — Medication 100 MILLIGRAM(S): at 05:07

## 2020-08-02 RX ADMIN — Medication 100 MILLIGRAM(S): at 14:43

## 2020-08-02 RX ADMIN — Medication 50 MILLIGRAM(S): at 17:30

## 2020-08-02 RX ADMIN — Medication 12 UNIT(S): at 08:43

## 2020-08-02 RX ADMIN — PIPERACILLIN AND TAZOBACTAM 25 GRAM(S): 4; .5 INJECTION, POWDER, LYOPHILIZED, FOR SOLUTION INTRAVENOUS at 05:07

## 2020-08-02 RX ADMIN — OXYCODONE HYDROCHLORIDE 5 MILLIGRAM(S): 5 TABLET ORAL at 15:30

## 2020-08-02 RX ADMIN — Medication 2: at 17:29

## 2020-08-02 RX ADMIN — OXYCODONE HYDROCHLORIDE 5 MILLIGRAM(S): 5 TABLET ORAL at 22:02

## 2020-08-02 NOTE — PROGRESS NOTE ADULT - ASSESSMENT
65 year old with CAD, DM, Neuropathy TIA, AF, presents with large deep ulcer over the right pretibial area that developed spontaneously as bulla  with history of trauma.   Erythema  Discharge  Also stasis on contralateral leg  ? vascular ulcer with progression due to poor circulation  ?above with superinfection  Patient hemodynamically stable-no other s/s infection but has elevated WBC  Vascular on case-input noted  Rec:  A) leg ulcer  continue monitoring  vascular follow up-pt refusing intervention  If continues to worsen may also need imaging to r/o deeper foci  continue empiric abx as above-no further vanco    B) leucocytosis  ? due to above  trend and monitor    Will tailor plan for ID issues  per course,results.Will defer to primary team on management of other issues.  Assessment, plan and recommendations as detailed above were discussed with the medical/primary  team.  Infectious Diseases Service will cover over weekend.  Please call [phone] if issues

## 2020-08-02 NOTE — PROGRESS NOTE ADULT - ASSESSMENT
65 year old man with PMH of CAD/MI s/p PCI 2014, T2DM on insulin c/b neuropathy, TIA, HTN, Afib on pradaxa, HFrEF (EF 40-45%) p/w RLE erythema and blisters, Dx of RLE cellulitis.     1. RLE Cellulitis   -IV abx per ID  -wound care f/u  -vasc f/u  -pain management    2. PAD  -pletal for med tx  -wants to defer le  angio for now due to concern for lakshmi    3. ANT/CKD  -renal f/u  -creat stable  -continue bumex daily/metol as ordered    4. Chronic systolic and diastolic heart failure  -volume status stable  -continue bumex daily as ordered with metol  -cont to monitor creat    5. Atrial Fibrillation, chronic   -rates stable   -c/w metoprolol succinate ER 50mg BID   -c/w cardizem  mg daily    -a/c, pradaxa 150mg BID    6. Coronary Artery Disease. S/P PCI to LAD, LCx.  -stable, Continue ASA 81mg     7. Gout  steroids per primary team

## 2020-08-02 NOTE — PROGRESS NOTE ADULT - ASSESSMENT
65M with PMH of CAD/MI s/p stents (~2014), T2DM on insulin c/b neuropathy, TIA, HTN, Afib on pradaxa, HFrEF (EF 40-45%) p/w RLE erythema and blisters. Pknown peripheral vascular disease and neuropathy in b/l feet with significantly decreased sensation and pain. admitted for cellulitis and PAD. noticed with cr of 2.7. in 2019 last cr 1.6-1.7 mg/dl    1- ANT on ckd III   2- paraproteinemia   3- hx chf   4- HTN       ant in setting of infection + bactrim use +/- ? over diureses  pt to have repeat spep/ipep and urine bence pinto ? MGUS pt is not anemic  cont bumex 2 mg qd and hold metolazone for 1 day. he is not in chf and cr is higher  cont cardizem cd 120 mg qd and toprol   trend   off  bactrim  if for LE angio/and/or CTA then will stand at risk of worsening renal function. this was d/w pt    hold bumex and metolazone today and in am unless sob

## 2020-08-02 NOTE — PROGRESS NOTE ADULT - ASSESSMENT
65M with PMH of CAD/MI s/p stents (~2014), T2DM on insulin c/b neuropathy, TIA, HTN, Afib on pradaxa, HFrEF (EF 40-45%) p/w RLE erythema and blisters, a/w cellulitis that has failed output abx therapy.      Problem/Plan - 1:  ·  Problem: Cellulitis of right lower extremity.    iv abs as per id  wound care   vascular f/u noted  pt thinking about doing angio now   will discuss w/ vascular  carmen/dopplers noted  tylenol, oxycodone prn for pain control.   pletal trial     Problem/Plan - 2:  ·  Problem:ckd  renal function stable  monitor Cr, avoid nephrotoxins, renally dose medications.      Problem/Plan - 3:  hx atrial fibrillation.   c/w meds  c/w pradaxa for AC   cards eval noted   Problem/Plan - 4:  ·  Problem: Constipation  c/w senna and miralax.      Problem/Plan - 5:  ·  Problem: Chronic systolic heart failure.  Plan: chronic HF with last TTE showing EF 40-45%  c/w bumex 2mg QD and metolazone 3x/week   c/w metoprolol   monitor Cr.      Problem/Plan - 6:  Problem: Type 2 diabetes mellitus with diabetic polyneuropathy, with long-term current use of insulin.   c/w home regimen of insulin - lantus 25units qhs and lispro 12/14/16 TID with meals  ISS and monitor FS ac and hs  adjust for inc glucose levels  c/w lyrica for neuropathy.     Problem/Plan - 7:  ·  Problem: Essential hypertension.  Plan: BP at goal, stable   c/w home antiHTN meds       Problem/Plan - 8:  ·  Problem: CAD (coronary artery disease).  Plan: MI/CAD s/p stents, last ~2014. No CP, EKG without ischemic changes   c/w ASA     gouty flare  improved

## 2020-08-03 LAB
ANION GAP SERPL CALC-SCNC: 18 MMOL/L — HIGH (ref 5–17)
BUN SERPL-MCNC: 64 MG/DL — HIGH (ref 7–23)
CALCIUM SERPL-MCNC: 9.2 MG/DL — SIGNIFICANT CHANGE UP (ref 8.4–10.5)
CHLORIDE SERPL-SCNC: 92 MMOL/L — LOW (ref 96–108)
CO2 SERPL-SCNC: 24 MMOL/L — SIGNIFICANT CHANGE UP (ref 22–31)
CREAT SERPL-MCNC: 2.7 MG/DL — HIGH (ref 0.5–1.3)
CULTURE RESULTS: SIGNIFICANT CHANGE UP
CULTURE RESULTS: SIGNIFICANT CHANGE UP
GLUCOSE BLDC GLUCOMTR-MCNC: 220 MG/DL — HIGH (ref 70–99)
GLUCOSE BLDC GLUCOMTR-MCNC: 227 MG/DL — HIGH (ref 70–99)
GLUCOSE BLDC GLUCOMTR-MCNC: 253 MG/DL — HIGH (ref 70–99)
GLUCOSE BLDC GLUCOMTR-MCNC: 258 MG/DL — HIGH (ref 70–99)
GLUCOSE BLDC GLUCOMTR-MCNC: 279 MG/DL — HIGH (ref 70–99)
GLUCOSE SERPL-MCNC: 217 MG/DL — HIGH (ref 70–99)
HCT VFR BLD CALC: 42.7 % — SIGNIFICANT CHANGE UP (ref 39–50)
HGB BLD-MCNC: 13.1 G/DL — SIGNIFICANT CHANGE UP (ref 13–17)
MCHC RBC-ENTMCNC: 24.7 PG — LOW (ref 27–34)
MCHC RBC-ENTMCNC: 30.7 GM/DL — LOW (ref 32–36)
MCV RBC AUTO: 80.6 FL — SIGNIFICANT CHANGE UP (ref 80–100)
NRBC # BLD: 0 /100 WBCS — SIGNIFICANT CHANGE UP (ref 0–0)
PLATELET # BLD AUTO: 271 K/UL — SIGNIFICANT CHANGE UP (ref 150–400)
POTASSIUM SERPL-MCNC: 3.7 MMOL/L — SIGNIFICANT CHANGE UP (ref 3.5–5.3)
POTASSIUM SERPL-SCNC: 3.7 MMOL/L — SIGNIFICANT CHANGE UP (ref 3.5–5.3)
RBC # BLD: 5.3 M/UL — SIGNIFICANT CHANGE UP (ref 4.2–5.8)
RBC # FLD: 16.7 % — HIGH (ref 10.3–14.5)
SODIUM SERPL-SCNC: 134 MMOL/L — LOW (ref 135–145)
SPECIMEN SOURCE: SIGNIFICANT CHANGE UP
SPECIMEN SOURCE: SIGNIFICANT CHANGE UP
WBC # BLD: 10.98 K/UL — HIGH (ref 3.8–10.5)
WBC # FLD AUTO: 10.98 K/UL — HIGH (ref 3.8–10.5)

## 2020-08-03 PROCEDURE — 99232 SBSQ HOSP IP/OBS MODERATE 35: CPT

## 2020-08-03 RX ADMIN — PIPERACILLIN AND TAZOBACTAM 25 GRAM(S): 4; .5 INJECTION, POWDER, LYOPHILIZED, FOR SOLUTION INTRAVENOUS at 18:40

## 2020-08-03 RX ADMIN — Medication 100 MILLIGRAM(S): at 21:12

## 2020-08-03 RX ADMIN — Medication 1: at 22:36

## 2020-08-03 RX ADMIN — CILOSTAZOL 50 MILLIGRAM(S): 100 TABLET ORAL at 21:10

## 2020-08-03 RX ADMIN — Medication 650 MILLIGRAM(S): at 18:50

## 2020-08-03 RX ADMIN — Medication 3: at 13:49

## 2020-08-03 RX ADMIN — Medication 81 MILLIGRAM(S): at 11:19

## 2020-08-03 RX ADMIN — Medication 14 UNIT(S): at 13:51

## 2020-08-03 RX ADMIN — Medication 50 MILLIGRAM(S): at 18:39

## 2020-08-03 RX ADMIN — INSULIN GLARGINE 25 UNIT(S): 100 INJECTION, SOLUTION SUBCUTANEOUS at 22:36

## 2020-08-03 RX ADMIN — Medication 100 MILLIGRAM(S): at 05:54

## 2020-08-03 RX ADMIN — Medication 2: at 18:41

## 2020-08-03 RX ADMIN — OXYCODONE HYDROCHLORIDE 5 MILLIGRAM(S): 5 TABLET ORAL at 13:54

## 2020-08-03 RX ADMIN — Medication 2: at 08:50

## 2020-08-03 RX ADMIN — DABIGATRAN ETEXILATE MESYLATE 150 MILLIGRAM(S): 150 CAPSULE ORAL at 05:54

## 2020-08-03 RX ADMIN — CILOSTAZOL 50 MILLIGRAM(S): 100 TABLET ORAL at 11:19

## 2020-08-03 RX ADMIN — OXYCODONE HYDROCHLORIDE 5 MILLIGRAM(S): 5 TABLET ORAL at 14:36

## 2020-08-03 RX ADMIN — Medication 50 MILLIGRAM(S): at 05:54

## 2020-08-03 RX ADMIN — SENNA PLUS 2 TABLET(S): 8.6 TABLET ORAL at 21:10

## 2020-08-03 RX ADMIN — OXYCODONE HYDROCHLORIDE 5 MILLIGRAM(S): 5 TABLET ORAL at 06:00

## 2020-08-03 RX ADMIN — Medication 16 UNIT(S): at 18:41

## 2020-08-03 RX ADMIN — OXYCODONE HYDROCHLORIDE 5 MILLIGRAM(S): 5 TABLET ORAL at 21:35

## 2020-08-03 RX ADMIN — Medication 100 MILLIGRAM(S): at 18:40

## 2020-08-03 RX ADMIN — POLYETHYLENE GLYCOL 3350 17 GRAM(S): 17 POWDER, FOR SOLUTION ORAL at 11:18

## 2020-08-03 RX ADMIN — OXYCODONE HYDROCHLORIDE 5 MILLIGRAM(S): 5 TABLET ORAL at 21:12

## 2020-08-03 RX ADMIN — PIPERACILLIN AND TAZOBACTAM 25 GRAM(S): 4; .5 INJECTION, POWDER, LYOPHILIZED, FOR SOLUTION INTRAVENOUS at 05:54

## 2020-08-03 RX ADMIN — DABIGATRAN ETEXILATE MESYLATE 150 MILLIGRAM(S): 150 CAPSULE ORAL at 18:40

## 2020-08-03 RX ADMIN — Medication 120 MILLIGRAM(S): at 05:54

## 2020-08-03 RX ADMIN — Medication 12 UNIT(S): at 08:49

## 2020-08-03 NOTE — PROGRESS NOTE ADULT - ASSESSMENT
65M with PMH of CAD/MI s/p stents (~2014), T2DM on insulin c/b neuropathy, TIA, HTN, Afib on pradaxa, HFrEF (EF 40-45%) p/w RLE erythema and blisters, a/w cellulitis that has failed output abx therapy.      Problem/Plan - 1:  ·  Problem: Cellulitis of right lower extremity.    iv abs as per id  wound care   vascular f/u noted  pt agrees w/ doing co2 angio of lower ext   carmen/dopplers noted  tylenol, oxycodone prn for pain control.   pletal trial     Problem/Plan - 2:  ·  Problem:ckd  renal function stable  monitor Cr, avoid nephrotoxins, renally dose medications.      Problem/Plan - 3:  hx atrial fibrillation.   c/w meds  c/w pradaxa for AC   cards eval noted   Problem/Plan - 4:  ·  Problem: Constipation  c/w senna and miralax.      Problem/Plan - 5:  ·  Problem: Chronic systolic heart failure.  Plan: chronic HF with last TTE showing EF 40-45%  c/w bumex 2mg QD and metolazone 3x/week   c/w metoprolol   monitor Cr.      Problem/Plan - 6:  Problem: Type 2 diabetes mellitus with diabetic polyneuropathy, with long-term current use of insulin.   c/w home regimen of insulin -  ISS and monitor FS ac and hs  adjust for inc glucose levels  c/w lyrica for neuropathy.     Problem/Plan - 7:  ·  Problem: Essential hypertension.  Plan: BP at goal, stable   c/w home antiHTN meds       Problem/Plan - 8:  ·  Problem: CAD (coronary artery disease).  Plan: MI/CAD/ hx stents  c/w ASA     gouty flare  improved

## 2020-08-03 NOTE — PROGRESS NOTE ADULT - ASSESSMENT
65 year old man with PMH of CAD/MI s/p PCI 2014, T2DM on insulin c/b neuropathy, TIA, HTN, Afib on pradaxa, HFrEF (EF 40-45%) p/w RLE erythema and blisters, Dx of RLE cellulitis.     1. RLE Cellulitis   -IV abx per ID  -wound care f/u  -vasc f/u  -pain management    2. PAD  -pletal for med tx  -wants to defer le angio for now due to concern for lakshmi    3. ANT/CKD  -renal f/u  -creat noted, diuretics on hold     4. Chronic systolic and diastolic heart failure  -volume status stable  -diuretics on hold today  -monitor for SOB   -if creat continues to improve, will resume bumex only     5. Atrial Fibrillation, chronic   -rates stable   -c/w metoprolol succinate ER 50mg BID   -c/w cardizem  mg daily    -a/c, pradaxa 150mg BID    6. Coronary Artery Disease. S/P PCI to LAD, LCx.  -stable, Continue ASA 81mg     7. Gout  steroids per primary team

## 2020-08-03 NOTE — PROGRESS NOTE ADULT - ATTENDING COMMENTS
agree with the above assessment and plan by th NP  CV status remains stable  cont current cardiac mgmt  abx per primary team  diuretic on hold

## 2020-08-03 NOTE — PROGRESS NOTE ADULT - ASSESSMENT
65 year old with CAD, DM, Neuropathy TIA, AF, presents with large deep ulcer over the right pretibial area that developed spontaneously as bulla  with history of trauma.   Erythema  Discharge  Also stasis on contralateral leg  ? vascular ulcer with progression due to poor circulation  ?above with superinfection  Patient hemodynamically stable-no other s/s infection but has elevated WBC  Vascular on case-input noted  Rec:  A) leg ulcer  continue monitoring  vascular follow up-pt refusing intervention  If continues to worsen may also need imaging to r/o deeper foci  continue empiric abx as above-no further vanco    plan to dc zosyn tomorrow

## 2020-08-03 NOTE — PROGRESS NOTE ADULT - ASSESSMENT
ASSESSMENT/PLAN:  Patient is a 65y old M w/ extensive cardiac hx and DM2 w/ neuropathy, presents w/ RLE cellulitis 2/2 to non-healing wound. Vascular consulted for evaluation of PAD and possible venous insufficiency.   impression  rle wound  may be due from a combo of arterial and venous insuff  there may be a component of AIOD     - will d/w IR scheduling a rle co2 angio  will follow

## 2020-08-03 NOTE — PROGRESS NOTE ADULT - ASSESSMENT
65M with PMH of CAD/MI s/p stents (~2014), T2DM on insulin c/b neuropathy, TIA, HTN, Afib on pradaxa, HFrEF (EF 40-45%) p/w RLE erythema and blisters. Pknown peripheral vascular disease and neuropathy in b/l feet with significantly decreased sensation and pain. admitted for cellulitis and PAD. noticed with cr of 2.7. in 2019 last cr 1.6-1.7 mg/dl    1- ANT on ckd III   2- paraproteinemia   3- hx chf   4- HTN       ant in setting of infection + bactrim use +/- ? over diureses  pt to have repeat spep/ipep and urine bence pinto ? MGUS pt is not anemic  cont bumex 2 mg qd and hold metolazone for 1 day. for possible LE angio in am if does not proceed with angiogram or if sob then resume bumex and metolazone as he is high risk for recurrent chf as has been in past   cont cardizem cd 120 mg qd and toprol   to have spep/ipep/bence pinto   wife present and is awre of risks of worsening renal failure as well   d/w Dr. Pinto and cards

## 2020-08-03 NOTE — PROGRESS NOTE ADULT - ATTENDING COMMENTS
I Edward Pinto MD have personally  seen and examined the patient today and have noted the findings and formulated the plan of care.  I Edward Pinto MD have personally seen and examined the patient at bedside today at  10am I Edward Pinto MD have personally  seen and examined the patient today and have noted the findings and formulated the plan of care.  I Edward Pinto MD have personally seen and examined the patient at bedside today at  1pm

## 2020-08-04 DIAGNOSIS — N18.4 CHRONIC KIDNEY DISEASE, STAGE 4 (SEVERE): ICD-10-CM

## 2020-08-04 LAB
ANION GAP SERPL CALC-SCNC: 13 MMOL/L — SIGNIFICANT CHANGE UP (ref 5–17)
BUN SERPL-MCNC: 61 MG/DL — HIGH (ref 7–23)
CALCIUM SERPL-MCNC: 9.8 MG/DL — SIGNIFICANT CHANGE UP (ref 8.4–10.5)
CHLORIDE SERPL-SCNC: 94 MMOL/L — LOW (ref 96–108)
CO2 SERPL-SCNC: 29 MMOL/L — SIGNIFICANT CHANGE UP (ref 22–31)
CREAT SERPL-MCNC: 2.07 MG/DL — HIGH (ref 0.5–1.3)
GLUCOSE BLDC GLUCOMTR-MCNC: 165 MG/DL — HIGH (ref 70–99)
GLUCOSE BLDC GLUCOMTR-MCNC: 268 MG/DL — HIGH (ref 70–99)
GLUCOSE BLDC GLUCOMTR-MCNC: 269 MG/DL — HIGH (ref 70–99)
GLUCOSE BLDC GLUCOMTR-MCNC: 366 MG/DL — HIGH (ref 70–99)
GLUCOSE SERPL-MCNC: 280 MG/DL — HIGH (ref 70–99)
HCT VFR BLD CALC: 45.9 % — SIGNIFICANT CHANGE UP (ref 39–50)
HGB BLD-MCNC: 13.8 G/DL — SIGNIFICANT CHANGE UP (ref 13–17)
MCHC RBC-ENTMCNC: 24.3 PG — LOW (ref 27–34)
MCHC RBC-ENTMCNC: 30.1 GM/DL — LOW (ref 32–36)
MCV RBC AUTO: 80.7 FL — SIGNIFICANT CHANGE UP (ref 80–100)
NRBC # BLD: 0 /100 WBCS — SIGNIFICANT CHANGE UP (ref 0–0)
PLATELET # BLD AUTO: 284 K/UL — SIGNIFICANT CHANGE UP (ref 150–400)
POTASSIUM SERPL-MCNC: 3.6 MMOL/L — SIGNIFICANT CHANGE UP (ref 3.5–5.3)
POTASSIUM SERPL-SCNC: 3.6 MMOL/L — SIGNIFICANT CHANGE UP (ref 3.5–5.3)
RBC # BLD: 5.69 M/UL — SIGNIFICANT CHANGE UP (ref 4.2–5.8)
RBC # FLD: 17.1 % — HIGH (ref 10.3–14.5)
SODIUM SERPL-SCNC: 136 MMOL/L — SIGNIFICANT CHANGE UP (ref 135–145)
WBC # BLD: 9.86 K/UL — SIGNIFICANT CHANGE UP (ref 3.8–10.5)
WBC # FLD AUTO: 9.86 K/UL — SIGNIFICANT CHANGE UP (ref 3.8–10.5)

## 2020-08-04 PROCEDURE — 71045 X-RAY EXAM CHEST 1 VIEW: CPT | Mod: 26

## 2020-08-04 PROCEDURE — 99232 SBSQ HOSP IP/OBS MODERATE 35: CPT

## 2020-08-04 RX ORDER — BUMETANIDE 0.25 MG/ML
2 INJECTION INTRAMUSCULAR; INTRAVENOUS DAILY
Refills: 0 | Status: DISCONTINUED | OUTPATIENT
Start: 2020-08-04 | End: 2020-08-07

## 2020-08-04 RX ORDER — OXYCODONE HYDROCHLORIDE 5 MG/1
5 TABLET ORAL EVERY 6 HOURS
Refills: 0 | Status: DISCONTINUED | OUTPATIENT
Start: 2020-08-04 | End: 2020-08-08

## 2020-08-04 RX ADMIN — Medication 100 MILLIGRAM(S): at 05:19

## 2020-08-04 RX ADMIN — DABIGATRAN ETEXILATE MESYLATE 150 MILLIGRAM(S): 150 CAPSULE ORAL at 17:40

## 2020-08-04 RX ADMIN — CILOSTAZOL 50 MILLIGRAM(S): 100 TABLET ORAL at 21:45

## 2020-08-04 RX ADMIN — Medication 3: at 08:42

## 2020-08-04 RX ADMIN — Medication 81 MILLIGRAM(S): at 12:02

## 2020-08-04 RX ADMIN — Medication 1: at 21:56

## 2020-08-04 RX ADMIN — Medication 12 UNIT(S): at 10:32

## 2020-08-04 RX ADMIN — OXYCODONE HYDROCHLORIDE 5 MILLIGRAM(S): 5 TABLET ORAL at 17:49

## 2020-08-04 RX ADMIN — Medication 50 MILLIGRAM(S): at 17:41

## 2020-08-04 RX ADMIN — Medication 16 UNIT(S): at 17:40

## 2020-08-04 RX ADMIN — CILOSTAZOL 50 MILLIGRAM(S): 100 TABLET ORAL at 12:02

## 2020-08-04 RX ADMIN — Medication 5: at 12:22

## 2020-08-04 RX ADMIN — SENNA PLUS 2 TABLET(S): 8.6 TABLET ORAL at 21:45

## 2020-08-04 RX ADMIN — OXYCODONE HYDROCHLORIDE 5 MILLIGRAM(S): 5 TABLET ORAL at 12:04

## 2020-08-04 RX ADMIN — OXYCODONE HYDROCHLORIDE 5 MILLIGRAM(S): 5 TABLET ORAL at 05:40

## 2020-08-04 RX ADMIN — Medication 100 MILLIGRAM(S): at 21:45

## 2020-08-04 RX ADMIN — Medication 120 MILLIGRAM(S): at 05:19

## 2020-08-04 RX ADMIN — Medication 50 MILLIGRAM(S): at 05:21

## 2020-08-04 RX ADMIN — OXYCODONE HYDROCHLORIDE 5 MILLIGRAM(S): 5 TABLET ORAL at 12:40

## 2020-08-04 RX ADMIN — POLYETHYLENE GLYCOL 3350 17 GRAM(S): 17 POWDER, FOR SOLUTION ORAL at 12:03

## 2020-08-04 RX ADMIN — Medication 100 MILLIGRAM(S): at 13:58

## 2020-08-04 RX ADMIN — Medication 14 UNIT(S): at 12:23

## 2020-08-04 RX ADMIN — OXYCODONE HYDROCHLORIDE 5 MILLIGRAM(S): 5 TABLET ORAL at 05:19

## 2020-08-04 RX ADMIN — OXYCODONE HYDROCHLORIDE 5 MILLIGRAM(S): 5 TABLET ORAL at 23:50

## 2020-08-04 RX ADMIN — Medication 1: at 17:40

## 2020-08-04 RX ADMIN — DABIGATRAN ETEXILATE MESYLATE 150 MILLIGRAM(S): 150 CAPSULE ORAL at 05:19

## 2020-08-04 RX ADMIN — PIPERACILLIN AND TAZOBACTAM 25 GRAM(S): 4; .5 INJECTION, POWDER, LYOPHILIZED, FOR SOLUTION INTRAVENOUS at 03:04

## 2020-08-04 RX ADMIN — INSULIN GLARGINE 25 UNIT(S): 100 INJECTION, SOLUTION SUBCUTANEOUS at 21:55

## 2020-08-04 RX ADMIN — OXYCODONE HYDROCHLORIDE 5 MILLIGRAM(S): 5 TABLET ORAL at 18:30

## 2020-08-04 RX ADMIN — BUMETANIDE 2 MILLIGRAM(S): 0.25 INJECTION INTRAMUSCULAR; INTRAVENOUS at 10:39

## 2020-08-04 NOTE — PROGRESS NOTE ADULT - ASSESSMENT
65M with PMH of CAD/MI s/p stents (~2014), T2DM on insulin c/b neuropathy, TIA, HTN, Afib on pradaxa, HFrEF (EF 40-45%) p/w RLE erythema and blisters. Pknown peripheral vascular disease and neuropathy in b/l feet with significantly decreased sensation and pain. admitted for cellulitis and PAD. noticed with cr of 2.7. in 2019 last cr 1.6-1.7 mg/dl    1- ckd III   2- paraproteinemia   3- hx chf   4- HTN       d/w vascular earlier and now scheduled for angio on 8/6.  resume bumex 2 mg qd. he does not want to resume metoalzone at present   cr is better   cont cardizem cd 120 mg qd and toprol    spep/ipep/bence jones

## 2020-08-04 NOTE — PROGRESS NOTE ADULT - ATTENDING COMMENTS
agree with the above assessment and plan by th NP  CV status remains stable  cont current cardiac mgmt  abx per primary team  creat improved, diuretic resumed

## 2020-08-04 NOTE — PROGRESS NOTE ADULT - ASSESSMENT
65 year old with CAD, DM, Neuropathy TIA, AF, presents with large deep ulcer over the right pretibial area that developed spontaneously as bulla  with history of trauma.   Erythema  Discharge  Also stasis on contralateral leg  ? vascular ulcer with progression due to poor circulation  ?above with superinfection  Patient hemodynamically stable-no other s/s infection but has elevated WBC  Vascular on case-input noted  Rec:  A) leg ulcer  continue monitoring  vascular follow up- for angio   no signs of active infection        plan to dc zosyn

## 2020-08-04 NOTE — PROGRESS NOTE ADULT - ASSESSMENT
65 year old man with PMH of CAD/MI s/p PCI 2014, T2DM on insulin c/b neuropathy, TIA, HTN, Afib on pradaxa, HFrEF (EF 40-45%) p/w RLE erythema and blisters, Dx of RLE cellulitis.     1. RLE Cellulitis   -IV abx per ID  -wound care f/u  -vasc f/u  -pain management    2. PAD  -pletal for med tx  -plan for LE angio on thursday   -vascular f/u     3. ANT/CKD  -renal f/u  -creat improved, PO bumex resumed, continue to hold metolazone     4. Chronic systolic and diastolic heart failure  -volume status stable  -creat improved, PO bumex resumed, continue to hold metolazone     5. Atrial Fibrillation, chronic   -rates stable   -c/w metoprolol succinate ER 50mg BID   -c/w cardizem  mg daily    -a/c, pradaxa 150mg BID    6. Coronary Artery Disease. S/P PCI to LAD, LCx.  -stable, Continue ASA 81mg     7. Gout  steroids per primary team

## 2020-08-04 NOTE — PROGRESS NOTE ADULT - ASSESSMENT
65M with PMH of CAD/MI s/p stents (~2014), T2DM on insulin c/b neuropathy, TIA, HTN, Afib on pradaxa, HFrEF (EF 40-45%) p/w RLE erythema and blisters, a/w cellulitis that has failed output abx therapy.      Problem/Plan - 1:  ·  Problem: Cellulitis of right lower extremity.    iv abs as per id  wound care   vascular f/u noted  pt agrees w/ doing angio of lower ext   vascular aware and setting it up   carmen/dopplers noted  tylenol, oxycodone prn for pain control.   pletal trial     Problem/Plan - 2:  ·  Problem:ckd/alex improved  renal function stable  monitor Cr, avoid nephrotoxins, renally dose medications.      Problem/Plan - 3:  hx atrial fibrillation.   c/w meds  c/w pradaxa for AC   cards eval noted   Problem/Plan - 4:  ·  Problem: Constipation  c/w senna and miralax.      Problem/Plan - 5:  ·  Problem: Chronic systolic heart failure.  Plan: chronic HF with last TTE showing EF 40-45%  c/wmeds  c/w current meds  monitor Cr.      Problem/Plan - 6:  Problem: Type 2 diabetes mellitus with diabetic polyneuropathy, with long-term current use of insulin.   c/w home regimen of insulin -  ISS and monitor FS ac and hs  adjust for inc glucose levels  c/w lyrica for neuropathy.     Problem/Plan - 7:  ·  Problem: Essential hypertension.  Plan: BP at goal, stable   c/w home antiHTN meds       Problem/Plan - 8:  ·  Problem: CAD (coronary artery disease).  Plan: MI/CAD/ hx stents  c/w current meds    gouty flare  improved

## 2020-08-04 NOTE — CHART NOTE - NSCHARTNOTEFT_GEN_A_CORE
Planning for RLE Angio Thursday 8/6/2020. Please optimize patient and document medical and cardiac clearance. D/w primary team.    Sherry Allen PA-C  Vascular Surgery [phone]

## 2020-08-04 NOTE — PROGRESS NOTE ADULT - ASSESSMENT
ASSESSMENT/PLAN:  Patient is a 65y old M w/ extensive cardiac hx and DM2 w/ neuropathy, presents w/ RLE cellulitis 2/2 to non-healing wound. Vascular consulted for evaluation of PAD and possible venous insufficiency.   impression  rle wound  may be due from a combo of arterial and venous insuff  there may be a component of AIOD     - will proceed w rle angio poss ba/stent  thurs  indications risks and benefits reviewed w pt again  will follow

## 2020-08-04 NOTE — PROGRESS NOTE ADULT - ATTENDING COMMENTS
I Edward Pinto MD have personally  seen and examined the patient today and have noted the findings and formulated the plan of care.  I Edward Pinto MD have personally seen and examined the patient at bedside today at  7pm

## 2020-08-05 ENCOUNTER — TRANSCRIPTION ENCOUNTER (OUTPATIENT)
Age: 65
End: 2020-08-05

## 2020-08-05 LAB
ANION GAP SERPL CALC-SCNC: 13 MMOL/L — SIGNIFICANT CHANGE UP (ref 5–17)
APPEARANCE UR: CLEAR — SIGNIFICANT CHANGE UP
APTT BLD: 53.2 SEC — HIGH (ref 27.5–35.5)
BACTERIA # UR AUTO: NEGATIVE — SIGNIFICANT CHANGE UP
BILIRUB UR-MCNC: NEGATIVE — SIGNIFICANT CHANGE UP
BLD GP AB SCN SERPL QL: NEGATIVE — SIGNIFICANT CHANGE UP
BUN SERPL-MCNC: 60 MG/DL — HIGH (ref 7–23)
CALCIUM SERPL-MCNC: 9.5 MG/DL — SIGNIFICANT CHANGE UP (ref 8.4–10.5)
CHLORIDE SERPL-SCNC: 92 MMOL/L — LOW (ref 96–108)
CO2 SERPL-SCNC: 30 MMOL/L — SIGNIFICANT CHANGE UP (ref 22–31)
COLOR SPEC: SIGNIFICANT CHANGE UP
CREAT SERPL-MCNC: 2.1 MG/DL — HIGH (ref 0.5–1.3)
DIFF PNL FLD: ABNORMAL
EPI CELLS # UR: 0 /HPF — SIGNIFICANT CHANGE UP (ref 0–5)
GLUCOSE BLDC GLUCOMTR-MCNC: 232 MG/DL — HIGH (ref 70–99)
GLUCOSE BLDC GLUCOMTR-MCNC: 261 MG/DL — HIGH (ref 70–99)
GLUCOSE BLDC GLUCOMTR-MCNC: 267 MG/DL — HIGH (ref 70–99)
GLUCOSE BLDC GLUCOMTR-MCNC: 309 MG/DL — HIGH (ref 70–99)
GLUCOSE BLDC GLUCOMTR-MCNC: 314 MG/DL — HIGH (ref 70–99)
GLUCOSE SERPL-MCNC: 254 MG/DL — HIGH (ref 70–99)
GLUCOSE UR QL: ABNORMAL
HCT VFR BLD CALC: 43.1 % — SIGNIFICANT CHANGE UP (ref 39–50)
HGB BLD-MCNC: 13 G/DL — SIGNIFICANT CHANGE UP (ref 13–17)
HYALINE CASTS # UR AUTO: 1 /LPF — SIGNIFICANT CHANGE UP (ref 0–7)
INR BLD: 1.5 RATIO — HIGH (ref 0.88–1.16)
KETONES UR-MCNC: NEGATIVE — SIGNIFICANT CHANGE UP
LEUKOCYTE ESTERASE UR-ACNC: NEGATIVE — SIGNIFICANT CHANGE UP
MAGNESIUM SERPL-MCNC: 2.2 MG/DL — SIGNIFICANT CHANGE UP (ref 1.6–2.6)
MCHC RBC-ENTMCNC: 24.2 PG — LOW (ref 27–34)
MCHC RBC-ENTMCNC: 30.2 GM/DL — LOW (ref 32–36)
MCV RBC AUTO: 80.3 FL — SIGNIFICANT CHANGE UP (ref 80–100)
NITRITE UR-MCNC: NEGATIVE — SIGNIFICANT CHANGE UP
NRBC # BLD: 0 /100 WBCS — SIGNIFICANT CHANGE UP (ref 0–0)
PH UR: 6 — SIGNIFICANT CHANGE UP (ref 5–8)
PHOSPHATE SERPL-MCNC: 3.9 MG/DL — SIGNIFICANT CHANGE UP (ref 2.5–4.5)
PLATELET # BLD AUTO: 298 K/UL — SIGNIFICANT CHANGE UP (ref 150–400)
POTASSIUM SERPL-MCNC: 3.5 MMOL/L — SIGNIFICANT CHANGE UP (ref 3.5–5.3)
POTASSIUM SERPL-SCNC: 3.5 MMOL/L — SIGNIFICANT CHANGE UP (ref 3.5–5.3)
PROT UR-MCNC: ABNORMAL
PROTHROM AB SERPL-ACNC: 17.4 SEC — HIGH (ref 10.6–13.6)
RBC # BLD: 5.37 M/UL — SIGNIFICANT CHANGE UP (ref 4.2–5.8)
RBC # FLD: 16.5 % — HIGH (ref 10.3–14.5)
RBC CASTS # UR COMP ASSIST: 10 /HPF — HIGH (ref 0–4)
RH IG SCN BLD-IMP: POSITIVE — SIGNIFICANT CHANGE UP
SARS-COV-2 RNA SPEC QL NAA+PROBE: SIGNIFICANT CHANGE UP
SODIUM SERPL-SCNC: 135 MMOL/L — SIGNIFICANT CHANGE UP (ref 135–145)
SP GR SPEC: 1.01 — SIGNIFICANT CHANGE UP (ref 1.01–1.02)
UROBILINOGEN FLD QL: SIGNIFICANT CHANGE UP
WBC # BLD: 12.22 K/UL — HIGH (ref 3.8–10.5)
WBC # FLD AUTO: 12.22 K/UL — HIGH (ref 3.8–10.5)
WBC UR QL: 1 /HPF — SIGNIFICANT CHANGE UP (ref 0–5)

## 2020-08-05 PROCEDURE — 99232 SBSQ HOSP IP/OBS MODERATE 35: CPT

## 2020-08-05 RX ORDER — LIDOCAINE 4 G/100G
1 CREAM TOPICAL DAILY
Refills: 0 | Status: DISCONTINUED | OUTPATIENT
Start: 2020-08-05 | End: 2020-08-08

## 2020-08-05 RX ORDER — INSULIN GLARGINE 100 [IU]/ML
14 INJECTION, SOLUTION SUBCUTANEOUS AT BEDTIME
Refills: 0 | Status: DISCONTINUED | OUTPATIENT
Start: 2020-08-05 | End: 2020-08-06

## 2020-08-05 RX ORDER — INSULIN LISPRO 100/ML
VIAL (ML) SUBCUTANEOUS EVERY 6 HOURS
Refills: 0 | Status: DISCONTINUED | OUTPATIENT
Start: 2020-08-05 | End: 2020-08-06

## 2020-08-05 RX ADMIN — INSULIN GLARGINE 14 UNIT(S): 100 INJECTION, SOLUTION SUBCUTANEOUS at 21:47

## 2020-08-05 RX ADMIN — OXYCODONE HYDROCHLORIDE 5 MILLIGRAM(S): 5 TABLET ORAL at 22:45

## 2020-08-05 RX ADMIN — Medication 3: at 17:50

## 2020-08-05 RX ADMIN — Medication 1: at 21:48

## 2020-08-05 RX ADMIN — OXYCODONE HYDROCHLORIDE 5 MILLIGRAM(S): 5 TABLET ORAL at 00:20

## 2020-08-05 RX ADMIN — OXYCODONE HYDROCHLORIDE 5 MILLIGRAM(S): 5 TABLET ORAL at 14:06

## 2020-08-05 RX ADMIN — Medication 4: at 13:20

## 2020-08-05 RX ADMIN — OXYCODONE HYDROCHLORIDE 5 MILLIGRAM(S): 5 TABLET ORAL at 07:58

## 2020-08-05 RX ADMIN — Medication 100 MILLIGRAM(S): at 21:47

## 2020-08-05 RX ADMIN — CILOSTAZOL 50 MILLIGRAM(S): 100 TABLET ORAL at 13:16

## 2020-08-05 RX ADMIN — OXYCODONE HYDROCHLORIDE 5 MILLIGRAM(S): 5 TABLET ORAL at 15:00

## 2020-08-05 RX ADMIN — Medication 100 MILLIGRAM(S): at 13:20

## 2020-08-05 RX ADMIN — Medication 14 UNIT(S): at 13:21

## 2020-08-05 RX ADMIN — Medication 12 UNIT(S): at 08:47

## 2020-08-05 RX ADMIN — DABIGATRAN ETEXILATE MESYLATE 150 MILLIGRAM(S): 150 CAPSULE ORAL at 05:13

## 2020-08-05 RX ADMIN — OXYCODONE HYDROCHLORIDE 5 MILLIGRAM(S): 5 TABLET ORAL at 08:56

## 2020-08-05 RX ADMIN — Medication 2: at 08:46

## 2020-08-05 RX ADMIN — Medication 16 UNIT(S): at 17:51

## 2020-08-05 RX ADMIN — Medication 100 MILLIGRAM(S): at 05:13

## 2020-08-05 RX ADMIN — CILOSTAZOL 50 MILLIGRAM(S): 100 TABLET ORAL at 21:46

## 2020-08-05 RX ADMIN — POLYETHYLENE GLYCOL 3350 17 GRAM(S): 17 POWDER, FOR SOLUTION ORAL at 13:17

## 2020-08-05 RX ADMIN — DABIGATRAN ETEXILATE MESYLATE 150 MILLIGRAM(S): 150 CAPSULE ORAL at 17:51

## 2020-08-05 RX ADMIN — Medication 81 MILLIGRAM(S): at 13:16

## 2020-08-05 RX ADMIN — SENNA PLUS 2 TABLET(S): 8.6 TABLET ORAL at 21:46

## 2020-08-05 RX ADMIN — Medication 50 MILLIGRAM(S): at 17:51

## 2020-08-05 RX ADMIN — BUMETANIDE 2 MILLIGRAM(S): 0.25 INJECTION INTRAMUSCULAR; INTRAVENOUS at 05:13

## 2020-08-05 RX ADMIN — OXYCODONE HYDROCHLORIDE 5 MILLIGRAM(S): 5 TABLET ORAL at 21:47

## 2020-08-05 RX ADMIN — Medication 120 MILLIGRAM(S): at 05:13

## 2020-08-05 RX ADMIN — Medication 50 MILLIGRAM(S): at 05:13

## 2020-08-05 NOTE — PROGRESS NOTE ADULT - ASSESSMENT
ASSESSMENT/PLAN:  Patient is a 65y old M w/ extensive cardiac hx and DM2 w/ neuropathy, presents w/ RLE cellulitis 2/2 to non-healing wound. Vascular consulted for evaluation of PAD and possible venous insufficiency.   impression  rle wound  may be due from a combo of arterial and venous insuff  there may be a component of AIOD     - will proceed w rle angio poss ba/stent  thurs  npo after midnight   will follow

## 2020-08-05 NOTE — PROGRESS NOTE ADULT - ATTENDING COMMENTS
I Edward Pinto MD have personally  seen and examined the patient today and have noted the findings and formulated the plan of care.  I Edward Pinto MD have personally seen and examined the patient at bedside today at  730am

## 2020-08-05 NOTE — PROGRESS NOTE ADULT - ASSESSMENT
65M with PMH of CAD/MI s/p stents (~2014), T2DM on insulin c/b neuropathy, TIA, HTN, Afib on pradaxa, HFrEF (EF 40-45%) p/w RLE erythema and blisters, a/w cellulitis that has failed output abx therapy.      Problem/Plan - 1:  ·  Problem: Cellulitis of right lower extremity.    iv abs as per id  wound care   vascular f/u noted  pt agrees w/ doing angio of lower ext   vascular aware and setting it up for tomorrow  carmen/dopplers noted  tylenol, oxycodone prn for pain control.   pletal trial     Problem/Plan - 2:  ·  Problem:ckd/alex improved  renal function stable  monitor Cr, avoid nephrotoxins, renally dose medications.      Problem/Plan - 3:  hx atrial fibrillation.   c/w meds  c/w pradaxa for AC   cards eval noted   Problem/Plan - 4:  ·  Problem: Constipation  c/w senna and miralax.      Problem/Plan - 5:  ·  Problem: Chronic systolic heart failure.  Plan: chronic HF with last TTE showing EF 40-45%  c/wmeds  c/w current meds  monitor Cr.      Problem/Plan - 6:  Problem: Type 2 diabetes mellitus with diabetic polyneuropathy, with long-term current use of insulin.   c/w home regimen of insulin -  ISS and monitor FS ac and hs  adjust for inc glucose levels  c/w lyrica for neuropathy.     Problem/Plan - 7:  ·  Problem: Essential hypertension.  Plan: BP at goal, stable   c/w home antiHTN meds       Problem/Plan - 8:  ·  Problem: CAD (coronary artery disease).  Plan: MI/CAD/ hx stents  c/w current meds    gouty flare  improved  lidoderm patches to knees 65M with PMH of CAD/MI s/p stents (~2014), T2DM on insulin c/b neuropathy, TIA, HTN, Afib on pradaxa, HFrEF (EF 40-45%) p/w RLE erythema and blisters, a/w cellulitis that has failed output abx therapy.      Problem/Plan - 1:  ·  Problem: Cellulitis of right lower extremity.    iv abs as per id  wound care   vascular f/u noted  pt agrees w/ doing angio of lower ext   vascular aware and setting it up for tomorrow  carmen/dopplers noted  tylenol, oxycodone prn for pain control.   pletal trial     Problem/Plan - 2:  ·  Problem:ckd/alex improved  renal function stable  monitor Cr, avoid nephrotoxins, renally dose medications.      Problem/Plan - 3:  hx atrial fibrillation.   c/w meds  c/w pradaxa for AC   cards eval noted   Problem/Plan - 4:  ·  Problem: Constipation  c/w senna and miralax.      Problem/Plan - 5:  ·  Problem: Chronic systolic heart failure.  Plan: chronic HF with last TTE showing EF 40-45%  c/wmeds  c/w current meds  monitor Cr.      Problem/Plan - 6:  Problem: Type 2 diabetes mellitus with diabetic polyneuropathy, with long-term current use of insulin.   c/w home regimen of insulin -  ISS and monitor FS ac and hs  adjust for inc glucose levels  c/w lyrica for neuropathy.     Problem/Plan - 7:  ·  Problem: Essential hypertension.  Plan: BP at goal, stable   c/w home antiHTN meds       Problem/Plan - 8:  ·  Problem: CAD (coronary artery disease).  Plan: MI/CAD/ hx stents  c/w current meds    gouty flare  improved  lidoderm patches to knees    medically optimized for angiogram of r leg in am

## 2020-08-05 NOTE — PROGRESS NOTE ADULT - ASSESSMENT
65M with PMH of CAD/MI s/p stents (~2014), T2DM on insulin c/b neuropathy, TIA, HTN, Afib on pradaxa, HFrEF (EF 40-45%) p/w RLE erythema and blisters. Pknown peripheral vascular disease and neuropathy in b/l feet with significantly decreased sensation and pain. admitted for cellulitis and PAD. noticed with cr of 2.7. in 2019 last cr 1.6-1.7 mg/dl    1- ckd III   2- paraproteinemia   3- hx chf   4- HTN       cr is steady    now scheduled for angio on 8/6.  hold bumex in am   cont cardizem cd 120 mg qd and toprol

## 2020-08-05 NOTE — PROGRESS NOTE ADULT - ATTENDING COMMENTS
agree with the above assessment and plan by th NP  CV status remains stable  cont current cardiac mgmt  abx per primary team  creat stable  cont bumex

## 2020-08-05 NOTE — CHART NOTE - NSCHARTNOTEFT_GEN_A_CORE
Vascular Surgery Preop Note    Patient is a 65y old  Male who presents with a chief complaint of RLE cellulitis (05 Aug 2020 12:23)    Diagnosis: nonhealing RLE wound  Procedure: RLE angiogram, possible angioplasty, possible stent, possible bilateral LE angiogram  Surgeon: Dr. Pinto                          13.0   12. )-----------( 298      ( 05 Aug 2020 07:34 )             43.1     08-05    135  |  92<L>  |  60<H>  ----------------------------<  254<H>  3.5   |  30  |  2.10<H>    Ca    9.5      05 Aug 2020 07:34  Phos  3.9     08-05  Mg     2.2     08-05      PT/INR - ( 05 Aug 2020 09:35 )   PT: 17.4 sec;   INR: 1.50 ratio         PTT - ( 05 Aug 2020 09:35 )  PTT:53.2 sec  ABO Interpretation: O ( @ 06:33)    Urinalysis Basic - ( 05 Aug 2020 09:38 )    Color: Light Yellow / Appearance: Clear / S.015 / pH: x  Gluc: x / Ketone: Negative  / Bili: Negative / Urobili: <2 mg/dL   Blood: x / Protein: 100 mg/dL / Nitrite: Negative   Leuk Esterase: Negative / RBC: 10 /HPF / WBC 1 /HPF   Sq Epi: x / Non Sq Epi: 0 /HPF / Bacteria: Negative         MEDICATIONS  (STANDING):  aspirin enteric coated 81 milliGRAM(s) Oral daily  buMETAnide 2 milliGRAM(s) Oral daily  cilostazol 50 milliGRAM(s) Oral two times a day  dabigatran 150 milliGRAM(s) Oral two times a day  dextrose 5%. 1000 milliLiter(s) (50 mL/Hr) IV Continuous <Continuous>  dextrose 50% Injectable 12.5 Gram(s) IV Push once  dextrose 50% Injectable 25 Gram(s) IV Push once  dextrose 50% Injectable 25 Gram(s) IV Push once  diltiazem    milliGRAM(s) Oral daily  insulin glargine Injectable (LANTUS) 14 Unit(s) SubCutaneous at bedtime  insulin lispro (HumaLOG) corrective regimen sliding scale   SubCutaneous three times a day before meals  insulin lispro (HumaLOG) corrective regimen sliding scale   SubCutaneous at bedtime  insulin lispro Injectable (HumaLOG) 12 Unit(s) SubCutaneous before breakfast  insulin lispro Injectable (HumaLOG) 14 Unit(s) SubCutaneous before lunch  insulin lispro Injectable (HumaLOG) 16 Unit(s) SubCutaneous before dinner  metoprolol succinate ER 50 milliGRAM(s) Oral two times a day  polyethylene glycol 3350 17 Gram(s) Oral daily  pregabalin 100 milliGRAM(s) Oral three times a day  senna 2 Tablet(s) Oral at bedtime    MEDICATIONS  (PRN):  acetaminophen   Tablet .. 650 milliGRAM(s) Oral every 6 hours PRN Temp greater or equal to 38C (100.4F), Mild Pain (1 - 3)  dextrose 40% Gel 15 Gram(s) Oral once PRN Blood Glucose LESS THAN 70 milliGRAM(s)/deciliter  glucagon  Injectable 1 milliGRAM(s) IntraMuscular once PRN Glucose LESS THAN 70 milligrams/deciliter  lidocaine   Patch 1 Patch Transdermal daily PRN pain  lidocaine   Patch 1 Patch Transdermal daily PRN pain  oxyCODONE    IR 5 milliGRAM(s) Oral every 6 hours PRN moderate and severe pain      Assessment & Plan:  65y Male with RLE wound and poor healing. Patient is planned for RLE angiogram, possible angioplasty, possible stent, possible bilateral LE angiogram tomorrow. Optimized from IM/cardiac standpoint for operation.    [ ] NPO after midnight -- ordered  [ ] CXR -- done on 20  [ ] EKG -- done on 20  [ ] T&S -- 2 in system, ordered   [ ] AM CBC -- ordered  [ ] AM BMP -- ordered  [ ] AM PT, PTT, INR -- ordered  [ ] Vein mapping -- done on 20  [ ] COVID neg on 20  [ ] Documentation of medical optimization -- IM note   [ ] Documentation of cardiac optimization -- cardiology note   [ ] Consent -- signed and in chart     Arlet Casas, PGY-1  Vascular Surgery  [phone]

## 2020-08-05 NOTE — PROGRESS NOTE ADULT - ASSESSMENT
65 year old man with PMH of CAD/MI s/p PCI 2014, T2DM on insulin c/b neuropathy, TIA, HTN, Afib on pradaxa, HFrEF (EF 40-45%) p/w RLE erythema and blisters, Dx of RLE cellulitis.     1. RLE Cellulitis   -s/p IV abx per ID  -wound care f/u  -vasc f/u  -pain management    2. PAD  -pletal for med tx  -plan for LE angio on thursday, can proceed from a cv standpoint   -vascular f/u     3. ANT/CKD  -renal f/u  -creat improved, PO bumex resumed, continue to hold metolazone     4. Chronic systolic and diastolic heart failure  -volume status stable  -creat noted, on PO bumex, continue to hold metolazone     5. Atrial Fibrillation, chronic   -rates stable   -c/w metoprolol succinate ER 50mg BID   -c/w cardizem  mg daily    -a/c, pradaxa 150mg BID    6. Coronary Artery Disease. S/P PCI to LAD, LCx.  -stable, Continue ASA 81mg     7. Gout  s/p steroids per primary team

## 2020-08-06 LAB
ANION GAP SERPL CALC-SCNC: 17 MMOL/L — SIGNIFICANT CHANGE UP (ref 5–17)
APTT BLD: 52.9 SEC — HIGH (ref 27.5–35.5)
BLD GP AB SCN SERPL QL: NEGATIVE — SIGNIFICANT CHANGE UP
BUN SERPL-MCNC: 58 MG/DL — HIGH (ref 7–23)
CALCIUM SERPL-MCNC: 9.6 MG/DL — SIGNIFICANT CHANGE UP (ref 8.4–10.5)
CHLORIDE SERPL-SCNC: 92 MMOL/L — LOW (ref 96–108)
CO2 SERPL-SCNC: 27 MMOL/L — SIGNIFICANT CHANGE UP (ref 22–31)
CREAT SERPL-MCNC: 1.88 MG/DL — HIGH (ref 0.5–1.3)
GLUCOSE BLDC GLUCOMTR-MCNC: 173 MG/DL — HIGH (ref 70–99)
GLUCOSE BLDC GLUCOMTR-MCNC: 179 MG/DL — HIGH (ref 70–99)
GLUCOSE BLDC GLUCOMTR-MCNC: 183 MG/DL — HIGH (ref 70–99)
GLUCOSE BLDC GLUCOMTR-MCNC: 186 MG/DL — HIGH (ref 70–99)
GLUCOSE BLDC GLUCOMTR-MCNC: 237 MG/DL — HIGH (ref 70–99)
GLUCOSE SERPL-MCNC: 174 MG/DL — HIGH (ref 70–99)
HCT VFR BLD CALC: 44.1 % — SIGNIFICANT CHANGE UP (ref 39–50)
HGB BLD-MCNC: 13.2 G/DL — SIGNIFICANT CHANGE UP (ref 13–17)
INR BLD: 1.5 RATIO — HIGH (ref 0.88–1.16)
MAGNESIUM SERPL-MCNC: 2.2 MG/DL — SIGNIFICANT CHANGE UP (ref 1.6–2.6)
MCHC RBC-ENTMCNC: 23.9 PG — LOW (ref 27–34)
MCHC RBC-ENTMCNC: 29.9 GM/DL — LOW (ref 32–36)
MCV RBC AUTO: 79.9 FL — LOW (ref 80–100)
NRBC # BLD: 0 /100 WBCS — SIGNIFICANT CHANGE UP (ref 0–0)
PHOSPHATE SERPL-MCNC: 4.4 MG/DL — SIGNIFICANT CHANGE UP (ref 2.5–4.5)
PLATELET # BLD AUTO: 312 K/UL — SIGNIFICANT CHANGE UP (ref 150–400)
POTASSIUM SERPL-MCNC: 3.4 MMOL/L — LOW (ref 3.5–5.3)
POTASSIUM SERPL-SCNC: 3.4 MMOL/L — LOW (ref 3.5–5.3)
PROTHROM AB SERPL-ACNC: 17.5 SEC — HIGH (ref 10.6–13.6)
RBC # BLD: 5.52 M/UL — SIGNIFICANT CHANGE UP (ref 4.2–5.8)
RBC # FLD: 16.5 % — HIGH (ref 10.3–14.5)
RH IG SCN BLD-IMP: POSITIVE — SIGNIFICANT CHANGE UP
SODIUM SERPL-SCNC: 136 MMOL/L — SIGNIFICANT CHANGE UP (ref 135–145)
WBC # BLD: 12.94 K/UL — HIGH (ref 3.8–10.5)
WBC # FLD AUTO: 12.94 K/UL — HIGH (ref 3.8–10.5)

## 2020-08-06 PROCEDURE — 75625 CONTRAST EXAM ABDOMINL AORTA: CPT | Mod: 26

## 2020-08-06 PROCEDURE — 75710 ARTERY X-RAYS ARM/LEG: CPT | Mod: 26,RT

## 2020-08-06 PROCEDURE — 36245 INS CATH ABD/L-EXT ART 1ST: CPT | Mod: RT

## 2020-08-06 RX ORDER — DEXAMETHASONE 0.5 MG/5ML
8 ELIXIR ORAL ONCE
Refills: 0 | Status: DISCONTINUED | OUTPATIENT
Start: 2020-08-06 | End: 2020-08-06

## 2020-08-06 RX ORDER — SODIUM CHLORIDE 9 MG/ML
1000 INJECTION INTRAMUSCULAR; INTRAVENOUS; SUBCUTANEOUS
Refills: 0 | Status: DISCONTINUED | OUTPATIENT
Start: 2020-08-06 | End: 2020-08-08

## 2020-08-06 RX ORDER — ONDANSETRON 8 MG/1
4 TABLET, FILM COATED ORAL ONCE
Refills: 0 | Status: DISCONTINUED | OUTPATIENT
Start: 2020-08-06 | End: 2020-08-06

## 2020-08-06 RX ORDER — POTASSIUM CHLORIDE 20 MEQ
10 PACKET (EA) ORAL ONCE
Refills: 0 | Status: COMPLETED | OUTPATIENT
Start: 2020-08-06 | End: 2020-08-06

## 2020-08-06 RX ORDER — INSULIN LISPRO 100/ML
VIAL (ML) SUBCUTANEOUS
Refills: 0 | Status: DISCONTINUED | OUTPATIENT
Start: 2020-08-06 | End: 2020-08-08

## 2020-08-06 RX ORDER — INSULIN LISPRO 100/ML
VIAL (ML) SUBCUTANEOUS AT BEDTIME
Refills: 0 | Status: DISCONTINUED | OUTPATIENT
Start: 2020-08-06 | End: 2020-08-08

## 2020-08-06 RX ORDER — INSULIN GLARGINE 100 [IU]/ML
25 INJECTION, SOLUTION SUBCUTANEOUS AT BEDTIME
Refills: 0 | Status: DISCONTINUED | OUTPATIENT
Start: 2020-08-06 | End: 2020-08-08

## 2020-08-06 RX ORDER — HYDROMORPHONE HYDROCHLORIDE 2 MG/ML
0.25 INJECTION INTRAMUSCULAR; INTRAVENOUS; SUBCUTANEOUS
Refills: 0 | Status: DISCONTINUED | OUTPATIENT
Start: 2020-08-06 | End: 2020-08-06

## 2020-08-06 RX ADMIN — LIDOCAINE 1 PATCH: 4 CREAM TOPICAL at 08:01

## 2020-08-06 RX ADMIN — Medication 1: at 08:38

## 2020-08-06 RX ADMIN — Medication 50 MILLIGRAM(S): at 20:07

## 2020-08-06 RX ADMIN — SODIUM CHLORIDE 60 MILLILITER(S): 9 INJECTION INTRAMUSCULAR; INTRAVENOUS; SUBCUTANEOUS at 19:54

## 2020-08-06 RX ADMIN — Medication 16 UNIT(S): at 18:37

## 2020-08-06 RX ADMIN — OXYCODONE HYDROCHLORIDE 5 MILLIGRAM(S): 5 TABLET ORAL at 23:30

## 2020-08-06 RX ADMIN — POLYETHYLENE GLYCOL 3350 17 GRAM(S): 17 POWDER, FOR SOLUTION ORAL at 17:32

## 2020-08-06 RX ADMIN — OXYCODONE HYDROCHLORIDE 5 MILLIGRAM(S): 5 TABLET ORAL at 10:01

## 2020-08-06 RX ADMIN — INSULIN GLARGINE 25 UNIT(S): 100 INJECTION, SOLUTION SUBCUTANEOUS at 21:59

## 2020-08-06 RX ADMIN — Medication 100 MILLIGRAM(S): at 10:01

## 2020-08-06 RX ADMIN — OXYCODONE HYDROCHLORIDE 5 MILLIGRAM(S): 5 TABLET ORAL at 10:31

## 2020-08-06 RX ADMIN — OXYCODONE HYDROCHLORIDE 5 MILLIGRAM(S): 5 TABLET ORAL at 03:52

## 2020-08-06 RX ADMIN — LIDOCAINE 1 PATCH: 4 CREAM TOPICAL at 04:32

## 2020-08-06 RX ADMIN — CILOSTAZOL 50 MILLIGRAM(S): 100 TABLET ORAL at 20:06

## 2020-08-06 RX ADMIN — Medication 50 MILLIGRAM(S): at 10:00

## 2020-08-06 RX ADMIN — OXYCODONE HYDROCHLORIDE 5 MILLIGRAM(S): 5 TABLET ORAL at 17:31

## 2020-08-06 RX ADMIN — Medication 1: at 18:37

## 2020-08-06 RX ADMIN — LIDOCAINE 1 PATCH: 4 CREAM TOPICAL at 10:00

## 2020-08-06 RX ADMIN — Medication 100 MILLIEQUIVALENT(S): at 11:01

## 2020-08-06 RX ADMIN — Medication 100 MILLIGRAM(S): at 20:03

## 2020-08-06 RX ADMIN — Medication 1: at 03:52

## 2020-08-06 RX ADMIN — CILOSTAZOL 50 MILLIGRAM(S): 100 TABLET ORAL at 16:00

## 2020-08-06 RX ADMIN — Medication 1: at 14:50

## 2020-08-06 RX ADMIN — BUMETANIDE 2 MILLIGRAM(S): 0.25 INJECTION INTRAMUSCULAR; INTRAVENOUS at 16:00

## 2020-08-06 RX ADMIN — OXYCODONE HYDROCHLORIDE 5 MILLIGRAM(S): 5 TABLET ORAL at 18:01

## 2020-08-06 RX ADMIN — Medication 120 MILLIGRAM(S): at 09:59

## 2020-08-06 RX ADMIN — SENNA PLUS 2 TABLET(S): 8.6 TABLET ORAL at 20:07

## 2020-08-06 NOTE — PRE-ANESTHESIA EVALUATION ADULT - NSPROPOSEDPROCEDFT_GEN_ALL_CORE
Right lower extremity angiogram, angioplasty, and stent placement
Right lower extremity angiogram, angioplasty, stent

## 2020-08-06 NOTE — BRIEF OPERATIVE NOTE - NSICDXBRIEFPREOP_GEN_ALL_CORE_FT
PRE-OP DIAGNOSIS:  Atherosclerosis of right lower extremity with rest pain 06-Aug-2020 13:45:56  Taryn Cunha

## 2020-08-06 NOTE — BRIEF OPERATIVE NOTE - COMMENTS
Please have patient strict bedrest for 2 hrs then bedrest with bathroom privileges until tomorrow AM  Pradaxa restart tomorrow  start ASA and Trental

## 2020-08-06 NOTE — PROGRESS NOTE ADULT - ATTENDING COMMENTS
agree with the above assessment and plan by the NP  CV status remains stable  cont current cardiac mgmt  abx per primary team  creat stable  cont bumex  awaiting LE angio  optimized for procedure

## 2020-08-06 NOTE — PROGRESS NOTE ADULT - ASSESSMENT
65M with PMH of CAD/MI s/p stents (~2014), T2DM on insulin c/b neuropathy, TIA, HTN, Afib on pradaxa, HFrEF (EF 40-45%) p/w RLE erythema and blisters. Pknown peripheral vascular disease and neuropathy in b/l feet with significantly decreased sensation and pain. admitted for cellulitis and PAD. noticed with cr of 2.7. in 2019 last cr 1.6-1.7 mg/dl    1- ckd III   2- paraproteinemia   3- hx chf   4- HTN       cr is steady   for angio   hold bumex today for angio   gentle ivf hydration only    hypokalemia replete kcl   cont cardizem cd 120 mg qd and toprol

## 2020-08-06 NOTE — PROGRESS NOTE ADULT - ASSESSMENT
65M with PMH of CAD/MI s/p stents (~2014), T2DM on insulin c/b neuropathy, TIA, HTN, Afib on pradaxa, HFrEF (EF 40-45%) p/w RLE erythema and blisters, a/w cellulitis that has failed output abx therapy.      Problem/Plan - 1:  ·  Problem: Cellulitis of right lower extremity.    iv abs as per id  wound care   vascular f/u noted  pt agrees w/ doing angio of lower ext   angio today  pradaxa held  carmen/dopplers noted  tylenol, oxycodone prn for pain control.   pletal trial     Problem/Plan - 2:  ·  Problem:ckd/alex improved  renal function better  monitor Cr, avoid nephrotoxins, renally dose medications.      Problem/Plan - 3:  hx atrial fibrillation.   c/w meds  holding pradaxa for procedure  cards eval noted   Problem/Plan - 4:  ·  Problem: Constipation  c/w senna and miralax.      Problem/Plan - 5:  ·  Problem: Chronic systolic heart failure.  Plan: chronic HF with last TTE showing EF 40-45%  c/wmeds  c/w current meds  monitor Cr.      Problem/Plan - 6:  Problem: Type 2 diabetes mellitus with diabetic polyneuropathy, with long-term current use of insulin.   c/w home regimen of insulin -  ISS and monitor FS ac and hs  adjust for inc glucose levels  c/w lyrica for neuropathy.     Problem/Plan - 7:  ·  Problem: Essential hypertension.  Plan: BP at goal, stable   c/w home antiHTN meds       Problem/Plan - 8:  ·  Problem: CAD (coronary artery disease).  Plan: MI/CAD/ hx stents  c/w current meds    gouty flare  improved  lidoderm patches to knees    medically optimized for angiogram of r leg

## 2020-08-06 NOTE — PRE-ANESTHESIA EVALUATION ADULT - NSANTHOSAYNRD_GEN_A_CORE
No. MOR screening performed.  STOP BANG Legend: 0-2 = LOW Risk; 3-4 = INTERMEDIATE Risk; 5-8 = HIGH Risk
No. MOR screening performed.  STOP BANG Legend: 0-2 = LOW Risk; 3-4 = INTERMEDIATE Risk; 5-8 = HIGH Risk

## 2020-08-06 NOTE — PRE-ANESTHESIA EVALUATION ADULT - NSANTHPMHFT_GEN_ALL_CORE
MI (myocardial infarction): circa 2014  PVD  CKD  HFrEF (EF 40-45%)  CHF  Gout  Morbid obesity    Status post angioplasty with stent: LULU x 3 2/7/2014

## 2020-08-06 NOTE — CHART NOTE - NSCHARTNOTEFT_GEN_A_CORE
Assessed patient in PACU 2 hours s/p RLE angiogram. Patient is stable and ready to return to floor. Patient had weak doppler signals for b/l DP and and strong doppler b/l signals for PT.     Vascular [id number]

## 2020-08-06 NOTE — PROGRESS NOTE ADULT - ASSESSMENT
65 year old man with PMH of CAD/MI s/p PCI 2014, T2DM on insulin c/b neuropathy, TIA, HTN, Afib on pradaxa, HFrEF (EF 40-45%) p/w RLE erythema and blisters, Dx of RLE cellulitis.     1. RLE Cellulitis   -s/p IV abx per ID  -wound care f/u  -vasc f/u  -pain management    2. PAD  -pletal for med tx  -plan for LE angio today, can proceed from a cv standpoint   -vascular f/u     3. ANT/CKD  -renal f/u  -creat improved, PO bumex resumed, continue to hold metolazone     4. Chronic systolic and diastolic heart failure  -volume status stable  -c/w PO bumex, continue to hold metolazone     5. Atrial Fibrillation, chronic   -rates stable   -c/w metoprolol succinate ER 50mg BID   -c/w cardizem  mg daily    -a/c, pradaxa 150mg BID    6. Coronary Artery Disease. S/P PCI to LAD, LCx.  -stable, Continue ASA 81mg     7. Gout  s/p steroids per primary team

## 2020-08-06 NOTE — CHART NOTE - NSCHARTNOTEFT_GEN_A_CORE
Post Operative Note  Patient: DYLAN DEL CASTILLO 65y (1955) Male   MRN: [id number]  Location: [place] [address]  Visit: 07-28-20 Inpatient  Date: 08-06-20 @ 18:22    Procedure: S/P RLE angiogram    Subjective: Patient is resting comfortably in bed.      Objective:  Vitals: T(F): 96.8 (08-06-20 @ 16:00), Max: 99.1 (08-06-20 @ 11:31)  HR: 89 (08-06-20 @ 16:00)  BP: 157/71 (08-06-20 @ 16:00) (108/72 - 165/78)  RR: 15 (08-06-20 @ 16:00)  SpO2: 100% (08-06-20 @ 16:00)  Vent Settings:     In:   08-05-20 @ 07:01  -  08-06-20 @ 07:00  --------------------------------------------------------  IN: 840 mL      IV Fluids: dextrose 5%. 1000 milliLiter(s) (50 mL/Hr) IV Continuous <Continuous>      Out:   08-05-20 @ 07:01  -  08-06-20 @ 07:00  --------------------------------------------------------  OUT: 2500 mL      EBL:     Voided Urine:   08-05-20 @ 07:01  -  08-06-20 @ 07:00  --------------------------------------------------------  OUT: 2500 mL      Prado Catheter: no   Drains: no  MUNDO: no   ,   Chest Tube: no     NG Tube: no        Physical Examination:  General: NAD, resting comfortably in bed  HEENT: Normocephalic atraumatic  Respiratory: Nonlabored respirations, normal CW expansion.  Cardio: S1S2, regular rate and rhythm.  Vascular: extremities are warm and well perfused. DP b/l doppler signals present and PT b/l doppler signals present.    Imaging:  No post-op imaging studies    Assessment:  65yMale patient S/P RLE angiogram.    Plan:  - Pain control PRN  - Diet: Regular Diet  -aspirin and Pletal  - Activity: bed rest with bathroom priviledges  - DVT ppx: start Pradaxa tomorrow    Date/Time: 08-06-20 @ 18:22 Post Operative Note  Patient: DYLAN DEL CASTILLO 65y (1955) Male   MRN: [id number]  Location: [address]  Visit: 07-28-20 Inpatient  Date: 08-06-20 @ 18:22    Procedure: S/P RLE angiogram    Subjective: Patient is resting comfortably in bed. Has no complaints.      Objective:  Vitals: T(F): 96.8 (08-06-20 @ 16:00), Max: 99.1 (08-06-20 @ 11:31)  HR: 89 (08-06-20 @ 16:00)  BP: 157/71 (08-06-20 @ 16:00) (108/72 - 165/78)  RR: 15 (08-06-20 @ 16:00)  SpO2: 100% (08-06-20 @ 16:00)  Vent Settings:     In:   08-05-20 @ 07:01  -  08-06-20 @ 07:00  --------------------------------------------------------  IN: 840 mL      IV Fluids: dextrose 5%. 1000 milliLiter(s) (50 mL/Hr) IV Continuous <Continuous>      Out:   08-05-20 @ 07:01  -  08-06-20 @ 07:00  --------------------------------------------------------  OUT: 2500 mL      EBL:     Voided Urine:   08-05-20 @ 07:01  -  08-06-20 @ 07:00  --------------------------------------------------------  OUT: 2500 mL      Prado Catheter: no   Drains: no  MUNDO: no   ,   Chest Tube: no     NG Tube: no        Physical Examination:  General: NAD, resting comfortably in bed  HEENT: Normocephalic atraumatic  Respiratory: Nonlabored respirations, normal CW expansion.  Cardio: S1S2, regular rate and rhythm.  Vascular: extremities are warm and well perfused. DP b/l doppler signals present and PT b/l doppler signals present.    Imaging:  No post-op imaging studies    Assessment:  65yMale patient S/P RLE angiogram.    Plan:  - Pain control PRN  - Diet: Regular Diet  -aspirin and Pletal  - Activity: bed rest with bathroom privileges  - DVT ppx: start Pradaxa tomorrow    Date/Time: 08-06-20 @ 18:22

## 2020-08-06 NOTE — BRIEF OPERATIVE NOTE - OPERATION/FINDINGS
Aortogram and right lower extremity angiogram, found to have mild iliac disease, severe diseas in anterior tibial and peroneal arteries; good runoff in posterior tibial to the foot, closed with Minx device

## 2020-08-06 NOTE — PRE-ANESTHESIA EVALUATION ADULT - NSRADCARDRESULTSFT_GEN_ALL_CORE
TTE 10/17/19: Conclusions:  1. Moderate left atrial enlargement.  2. Normal left ventricular internal dimensions and wall thicknesses.  3. Technically difficult study despite use of echo contrast. Grossly borderline left ventricular systolic dysfunction. Endocardial visualization enhanced with intravenous injection of Ultrasonic Enhancing Agent (Definity). No obvious wall wall motion abnormalities.  4. Right ventricle not well seen.  *** Compared with echocardiogram of 12/29/2018, no significant changes noted (reported LVEF in last study is slightly lower, but atrial fibrillation makes it difficult  to assess ejection fraction).    Cardiac Cath 2/7/14:   Procedure:  --  Intervention on distal circumflex: drug-eluting stent.  --  Intervention on proximal circumflex: drug-eluting stent.    CORONARY VESSELS:  CX:   --  Proximal circumflex: There was a 80 % stenosis., --  Distal circumflex: There was a 80 % stenosis.    DIAGNOSTIC RECOMMENDATIONS: Severe LAD and LCx disease. PCI of proximal and  distal LCx with LULU in the setting of angina/NSTEMI.

## 2020-08-07 ENCOUNTER — TRANSCRIPTION ENCOUNTER (OUTPATIENT)
Age: 65
End: 2020-08-07

## 2020-08-07 LAB
ANION GAP SERPL CALC-SCNC: 16 MMOL/L — SIGNIFICANT CHANGE UP (ref 5–17)
BASOPHILS # BLD AUTO: 0.05 K/UL — SIGNIFICANT CHANGE UP (ref 0–0.2)
BASOPHILS NFR BLD AUTO: 0.4 % — SIGNIFICANT CHANGE UP (ref 0–2)
BUN SERPL-MCNC: 65 MG/DL — HIGH (ref 7–23)
CALCIUM SERPL-MCNC: 9.7 MG/DL — SIGNIFICANT CHANGE UP (ref 8.4–10.5)
CHLORIDE SERPL-SCNC: 90 MMOL/L — LOW (ref 96–108)
CO2 SERPL-SCNC: 28 MMOL/L — SIGNIFICANT CHANGE UP (ref 22–31)
CREAT SERPL-MCNC: 2.57 MG/DL — HIGH (ref 0.5–1.3)
EOSINOPHIL # BLD AUTO: 0.15 K/UL — SIGNIFICANT CHANGE UP (ref 0–0.5)
EOSINOPHIL NFR BLD AUTO: 1.3 % — SIGNIFICANT CHANGE UP (ref 0–6)
GLUCOSE BLDC GLUCOMTR-MCNC: 187 MG/DL — HIGH (ref 70–99)
GLUCOSE BLDC GLUCOMTR-MCNC: 239 MG/DL — HIGH (ref 70–99)
GLUCOSE BLDC GLUCOMTR-MCNC: 242 MG/DL — HIGH (ref 70–99)
GLUCOSE BLDC GLUCOMTR-MCNC: 319 MG/DL — HIGH (ref 70–99)
GLUCOSE SERPL-MCNC: 212 MG/DL — HIGH (ref 70–99)
HCT VFR BLD CALC: 42.1 % — SIGNIFICANT CHANGE UP (ref 39–50)
HGB BLD-MCNC: 12.7 G/DL — LOW (ref 13–17)
IMM GRANULOCYTES NFR BLD AUTO: 1.2 % — SIGNIFICANT CHANGE UP (ref 0–1.5)
LYMPHOCYTES # BLD AUTO: 17 % — SIGNIFICANT CHANGE UP (ref 13–44)
LYMPHOCYTES # BLD AUTO: 2 K/UL — SIGNIFICANT CHANGE UP (ref 1–3.3)
MAGNESIUM SERPL-MCNC: 2.2 MG/DL — SIGNIFICANT CHANGE UP (ref 1.6–2.6)
MCHC RBC-ENTMCNC: 24.3 PG — LOW (ref 27–34)
MCHC RBC-ENTMCNC: 30.2 GM/DL — LOW (ref 32–36)
MCV RBC AUTO: 80.5 FL — SIGNIFICANT CHANGE UP (ref 80–100)
MONOCYTES # BLD AUTO: 1.13 K/UL — HIGH (ref 0–0.9)
MONOCYTES NFR BLD AUTO: 9.6 % — SIGNIFICANT CHANGE UP (ref 2–14)
NEUTROPHILS # BLD AUTO: 8.3 K/UL — HIGH (ref 1.8–7.4)
NEUTROPHILS NFR BLD AUTO: 70.5 % — SIGNIFICANT CHANGE UP (ref 43–77)
NRBC # BLD: 0 /100 WBCS — SIGNIFICANT CHANGE UP (ref 0–0)
PHOSPHATE SERPL-MCNC: 5.5 MG/DL — HIGH (ref 2.5–4.5)
PLATELET # BLD AUTO: 350 K/UL — SIGNIFICANT CHANGE UP (ref 150–400)
POTASSIUM SERPL-MCNC: 4 MMOL/L — SIGNIFICANT CHANGE UP (ref 3.5–5.3)
POTASSIUM SERPL-SCNC: 4 MMOL/L — SIGNIFICANT CHANGE UP (ref 3.5–5.3)
RBC # BLD: 5.23 M/UL — SIGNIFICANT CHANGE UP (ref 4.2–5.8)
RBC # FLD: 16.5 % — HIGH (ref 10.3–14.5)
SODIUM SERPL-SCNC: 134 MMOL/L — LOW (ref 135–145)
WBC # BLD: 11.77 K/UL — HIGH (ref 3.8–10.5)
WBC # FLD AUTO: 11.77 K/UL — HIGH (ref 3.8–10.5)

## 2020-08-07 PROCEDURE — 99232 SBSQ HOSP IP/OBS MODERATE 35: CPT

## 2020-08-07 RX ORDER — DABIGATRAN ETEXILATE MESYLATE 150 MG/1
150 CAPSULE ORAL
Refills: 0 | Status: DISCONTINUED | OUTPATIENT
Start: 2020-08-07 | End: 2020-08-08

## 2020-08-07 RX ORDER — HYDROMORPHONE HYDROCHLORIDE 2 MG/ML
0.25 INJECTION INTRAMUSCULAR; INTRAVENOUS; SUBCUTANEOUS ONCE
Refills: 0 | Status: DISCONTINUED | OUTPATIENT
Start: 2020-08-07 | End: 2020-08-07

## 2020-08-07 RX ORDER — SODIUM CHLORIDE 9 MG/ML
1000 INJECTION INTRAMUSCULAR; INTRAVENOUS; SUBCUTANEOUS
Refills: 0 | Status: DISCONTINUED | OUTPATIENT
Start: 2020-08-07 | End: 2020-08-08

## 2020-08-07 RX ORDER — COLCHICINE 0.6 MG
0.6 TABLET ORAL ONCE
Refills: 0 | Status: COMPLETED | OUTPATIENT
Start: 2020-08-07 | End: 2020-08-07

## 2020-08-07 RX ORDER — PENTOXIFYLLINE 400 MG
400 TABLET, EXTENDED RELEASE ORAL DAILY
Refills: 0 | Status: DISCONTINUED | OUTPATIENT
Start: 2020-08-07 | End: 2020-08-08

## 2020-08-07 RX ADMIN — Medication 16 UNIT(S): at 17:16

## 2020-08-07 RX ADMIN — Medication 400 MILLIGRAM(S): at 17:17

## 2020-08-07 RX ADMIN — OXYCODONE HYDROCHLORIDE 5 MILLIGRAM(S): 5 TABLET ORAL at 05:14

## 2020-08-07 RX ADMIN — SODIUM CHLORIDE 60 MILLILITER(S): 9 INJECTION INTRAMUSCULAR; INTRAVENOUS; SUBCUTANEOUS at 22:19

## 2020-08-07 RX ADMIN — HYDROMORPHONE HYDROCHLORIDE 0.25 MILLIGRAM(S): 2 INJECTION INTRAMUSCULAR; INTRAVENOUS; SUBCUTANEOUS at 09:24

## 2020-08-07 RX ADMIN — OXYCODONE HYDROCHLORIDE 5 MILLIGRAM(S): 5 TABLET ORAL at 13:20

## 2020-08-07 RX ADMIN — INSULIN GLARGINE 25 UNIT(S): 100 INJECTION, SOLUTION SUBCUTANEOUS at 21:57

## 2020-08-07 RX ADMIN — Medication 10 MILLIGRAM(S): at 13:21

## 2020-08-07 RX ADMIN — SENNA PLUS 2 TABLET(S): 8.6 TABLET ORAL at 21:57

## 2020-08-07 RX ADMIN — Medication 2: at 13:04

## 2020-08-07 RX ADMIN — Medication 0.6 MILLIGRAM(S): at 13:20

## 2020-08-07 RX ADMIN — Medication 100 MILLIGRAM(S): at 21:57

## 2020-08-07 RX ADMIN — Medication 2: at 17:15

## 2020-08-07 RX ADMIN — OXYCODONE HYDROCHLORIDE 5 MILLIGRAM(S): 5 TABLET ORAL at 21:57

## 2020-08-07 RX ADMIN — Medication 100 MILLIGRAM(S): at 05:14

## 2020-08-07 RX ADMIN — OXYCODONE HYDROCHLORIDE 5 MILLIGRAM(S): 5 TABLET ORAL at 00:10

## 2020-08-07 RX ADMIN — BUMETANIDE 2 MILLIGRAM(S): 0.25 INJECTION INTRAMUSCULAR; INTRAVENOUS at 05:15

## 2020-08-07 RX ADMIN — Medication 120 MILLIGRAM(S): at 05:15

## 2020-08-07 RX ADMIN — Medication 14 UNIT(S): at 13:05

## 2020-08-07 RX ADMIN — POLYETHYLENE GLYCOL 3350 17 GRAM(S): 17 POWDER, FOR SOLUTION ORAL at 13:20

## 2020-08-07 RX ADMIN — OXYCODONE HYDROCHLORIDE 5 MILLIGRAM(S): 5 TABLET ORAL at 05:57

## 2020-08-07 RX ADMIN — HYDROMORPHONE HYDROCHLORIDE 0.25 MILLIGRAM(S): 2 INJECTION INTRAMUSCULAR; INTRAVENOUS; SUBCUTANEOUS at 09:40

## 2020-08-07 RX ADMIN — Medication 12 UNIT(S): at 08:48

## 2020-08-07 RX ADMIN — OXYCODONE HYDROCHLORIDE 5 MILLIGRAM(S): 5 TABLET ORAL at 13:50

## 2020-08-07 RX ADMIN — Medication 81 MILLIGRAM(S): at 13:20

## 2020-08-07 RX ADMIN — Medication 50 MILLIGRAM(S): at 17:17

## 2020-08-07 RX ADMIN — DABIGATRAN ETEXILATE MESYLATE 150 MILLIGRAM(S): 150 CAPSULE ORAL at 17:17

## 2020-08-07 RX ADMIN — Medication 2: at 21:58

## 2020-08-07 RX ADMIN — Medication 1: at 08:47

## 2020-08-07 RX ADMIN — Medication 100 MILLIGRAM(S): at 13:20

## 2020-08-07 RX ADMIN — Medication 50 MILLIGRAM(S): at 05:15

## 2020-08-07 RX ADMIN — OXYCODONE HYDROCHLORIDE 5 MILLIGRAM(S): 5 TABLET ORAL at 22:46

## 2020-08-07 NOTE — PROGRESS NOTE ADULT - ASSESSMENT
65M with PMH of CAD/MI s/p stents (~2014), T2DM on insulin c/b neuropathy, TIA, HTN, Afib on pradaxa, HFrEF (EF 40-45%) p/w RLE erythema and blisters. Pknown peripheral vascular disease and neuropathy in b/l feet with significantly decreased sensation and pain. admitted for cellulitis and PAD. noticed with cr of 2.7. in 2019 last cr 1.6-1.7 mg/dl    1- ckd III   2- paraproteinemia   3- hx chf   4- HTN       ANT on ckd ? due to LE angio/contrast etc  hold bumex today  as well   gentle ivf hydration  50 cc/hr x another 6 hr    cont cardizem cd 120 mg qd and toprol

## 2020-08-07 NOTE — DISCHARGE NOTE NURSING/CASE MANAGEMENT/SOCIAL WORK - NSSCTYPOFSERV_GEN_ALL_CORE
RN visit for wound care teaching/management for start of care the day after discharge. Please send patient home with some wound care supplies. Thank you!

## 2020-08-07 NOTE — CHART NOTE - NSCHARTNOTEFT_GEN_A_CORE
Nutrition Follow-up  Patient seen for: nutrition follow-up    Source: comprehensive chart review, ( )     Hospital course as per chart: Pt is a 64 yo male with PMH of CAD/MI s/p stents, T2DM on insulin complicated by neuropathy, TIA, HTN, afib, HFrEF, who presented with RLE erythema and blisters, admitted 7/28 with cellulitis. Chart reviewed, events noted. S/p RLE angiogram yesterday (8/6).     Diet: consistent carbohydrate (evening snack), DASH/TLC    Patient reports ( )    Encouraged PO intake as tolerated ( ).     Education provided:     PO intake:  < 50% [ ] 50-75% [ ]   % [ ]  other :    Source for PO intake:     Most recent weight: 246.9 pounds (8/5 bed) Edema noted. Question accuracy of bed scale weights. Will continue to monitor and trend weights.     Pertinent Medications: MEDICATIONS  (STANDING):  aspirin enteric coated 81 milliGRAM(s) Oral daily  buMETAnide 2 milliGRAM(s) Oral daily  cilostazol 50 milliGRAM(s) Oral two times a day  dabigatran 150 milliGRAM(s) Oral two times a day  dextrose 5%. 1000 milliLiter(s) (50 mL/Hr) IV Continuous <Continuous>  dextrose 50% Injectable 12.5 Gram(s) IV Push once  dextrose 50% Injectable 25 Gram(s) IV Push once  dextrose 50% Injectable 25 Gram(s) IV Push once  diltiazem    milliGRAM(s) Oral daily  insulin glargine Injectable (LANTUS) 25 Unit(s) SubCutaneous at bedtime  insulin lispro (HumaLOG) corrective regimen sliding scale   SubCutaneous three times a day before meals  insulin lispro (HumaLOG) corrective regimen sliding scale   SubCutaneous at bedtime  insulin lispro Injectable (HumaLOG) 12 Unit(s) SubCutaneous before breakfast  insulin lispro Injectable (HumaLOG) 14 Unit(s) SubCutaneous before lunch  insulin lispro Injectable (HumaLOG) 16 Unit(s) SubCutaneous before dinner  metoprolol succinate ER 50 milliGRAM(s) Oral two times a day  polyethylene glycol 3350 17 Gram(s) Oral daily  pregabalin 100 milliGRAM(s) Oral three times a day  senna 2 Tablet(s) Oral at bedtime  sodium chloride 0.9%. 1000 milliLiter(s) (60 mL/Hr) IV Continuous <Continuous>    MEDICATIONS  (PRN):  acetaminophen   Tablet .. 650 milliGRAM(s) Oral every 6 hours PRN Temp greater or equal to 38C (100.4F), Mild Pain (1 - 3)  dextrose 40% Gel 15 Gram(s) Oral once PRN Blood Glucose LESS THAN 70 milliGRAM(s)/deciliter  glucagon  Injectable 1 milliGRAM(s) IntraMuscular once PRN Glucose LESS THAN 70 milligrams/deciliter  lidocaine   Patch 1 Patch Transdermal daily PRN pain  lidocaine   Patch 1 Patch Transdermal daily PRN pain  oxyCODONE    IR 5 milliGRAM(s) Oral every 6 hours PRN moderate and severe pain    Pertinent Labs:  08-07 Na134 mmol/L<L> Glu 212 mg/dL<H> K+ 4.0 mmol/L Cr  2.57 mg/dL<H> BUN 65 mg/dL<H> 08-07 Phos 5.5 mg/dL<H>  Glucose fingersticks: (8/7) 187, (8/6) 173-237, (8/5) 232-314    Skin: +wounds, no pressure injuries per flowsheets   Edema: 1+ edema to b/l ankles, b/l feet (assessed 8/6)     Estimated Needs: no change since previous assessment  Based on  pounds (78 kg)  Estimated energy needs (25-30 kcal/kg): 4944-7755 kcal/day  Estimated protein needs (1.0-1.2 g/kg): 78-94 g/day  Defer fluids to team    Previous Nutrition Diagnosis: Altered nutrition related lab values  Nutrition Diagnosis is [x] ongoing  [ ] resolved [ ] not applicable   Being addressed with: nutrition education     New Nutrition Diagnosis: [ ] not applicable    [ ] Inadequate Protein Energy Intake [ ]Inadequate Oral Intake [ ] Excessive Energy Intake     [ ] Underweight [ ] Increased Nutrient Needs [ ] Overweight/Obesity     [ ] Altered GI Function [ ] Unintended Weight Loss [ ] Food & Nutrition Related Knowledge Deficit[ ] Limited Adherence to nutrition related recommendations [ ] Malnutrition  [ ] other: Free text    Recommendations:  1)     Monitoring and Evaluation: Monitor PO intake, weight, labs, skin, GI status, diet     RD remains available upon request and will continue to follow-up per protocol.   Kianna Enrique MS RD CDN pager #[phone] Nutrition Follow-up  Patient seen for: nutrition follow-up    Source: comprehensive chart review, patient     Hospital course as per chart: Pt is a 64 yo male with PMH of CAD/MI s/p stents, T2DM on insulin complicated by neuropathy, TIA, HTN, afib, HFrEF, who presented with RLE erythema and blisters, admitted 7/28 with cellulitis. Chart reviewed, events noted. S/p RLE angiogram yesterday (8/6).     Diet: consistent carbohydrate (evening snack), DASH/TLC    Patient reports good appetite and PO intake, consuming 100% of meals. Denies nausea/vomiting/diarrhea/constipation. Last BM 8/5 per pt + flowsheets, pt feels he will have a BM today. Encouraged continued good PO intake as tolerated. Obtained pt's food preferences, will honor as able to encourage intake. Pt declined review of/further diet education at this time. Made aware RD remains available.     PO intake:  < 50% [ ] 50-75% [ ]   % [x]  other :    Source for PO intake: patient, chart/flowsheets    Most recent weight: 246.9 pounds (8/5 bed) Edema noted. Question accuracy of bed scale weights. Will continue to monitor and trend weights.     Pertinent Medications: MEDICATIONS  (STANDING):  aspirin enteric coated 81 milliGRAM(s) Oral daily  buMETAnide 2 milliGRAM(s) Oral daily  cilostazol 50 milliGRAM(s) Oral two times a day  dabigatran 150 milliGRAM(s) Oral two times a day  dextrose 5%. 1000 milliLiter(s) (50 mL/Hr) IV Continuous <Continuous>  dextrose 50% Injectable 12.5 Gram(s) IV Push once  dextrose 50% Injectable 25 Gram(s) IV Push once  dextrose 50% Injectable 25 Gram(s) IV Push once  diltiazem    milliGRAM(s) Oral daily  insulin glargine Injectable (LANTUS) 25 Unit(s) SubCutaneous at bedtime  insulin lispro (HumaLOG) corrective regimen sliding scale   SubCutaneous three times a day before meals  insulin lispro (HumaLOG) corrective regimen sliding scale   SubCutaneous at bedtime  insulin lispro Injectable (HumaLOG) 12 Unit(s) SubCutaneous before breakfast  insulin lispro Injectable (HumaLOG) 14 Unit(s) SubCutaneous before lunch  insulin lispro Injectable (HumaLOG) 16 Unit(s) SubCutaneous before dinner  metoprolol succinate ER 50 milliGRAM(s) Oral two times a day  polyethylene glycol 3350 17 Gram(s) Oral daily  pregabalin 100 milliGRAM(s) Oral three times a day  senna 2 Tablet(s) Oral at bedtime  sodium chloride 0.9%. 1000 milliLiter(s) (60 mL/Hr) IV Continuous <Continuous>    MEDICATIONS  (PRN):  acetaminophen   Tablet .. 650 milliGRAM(s) Oral every 6 hours PRN Temp greater or equal to 38C (100.4F), Mild Pain (1 - 3)  dextrose 40% Gel 15 Gram(s) Oral once PRN Blood Glucose LESS THAN 70 milliGRAM(s)/deciliter  glucagon  Injectable 1 milliGRAM(s) IntraMuscular once PRN Glucose LESS THAN 70 milligrams/deciliter  lidocaine   Patch 1 Patch Transdermal daily PRN pain  lidocaine   Patch 1 Patch Transdermal daily PRN pain  oxyCODONE    IR 5 milliGRAM(s) Oral every 6 hours PRN moderate and severe pain    Pertinent Labs:  08-07 Na134 mmol/L<L> Glu 212 mg/dL<H> K+ 4.0 mmol/L Cr  2.57 mg/dL<H> BUN 65 mg/dL<H> 08-07 Phos 5.5 mg/dL<H>  Glucose fingersticks: (8/7) 187, (8/6) 173-237, (8/5) 232-314    Skin: +wounds, no pressure injuries per flowsheets   Edema: 1+ edema to b/l ankles, b/l feet (assessed 8/6)     Estimated Needs: no change since previous assessment  Based on  pounds (78 kg)  Estimated energy needs (25-30 kcal/kg): 8335-2254 kcal/day  Estimated protein needs (1.0-1.2 g/kg): 78-94 g/day  Defer fluids to team    Previous Nutrition Diagnosis: Altered nutrition related lab values  Nutrition Diagnosis is [x] ongoing  [ ] resolved [ ] not applicable   Being addressed with: nutrition education     New Nutrition Diagnosis: not applicable    Recommendations:  1) Continue current diet: consistent carbohydrate (evening snack), DASH/TLC. Monitor/adjust as needed.  2) Encouraged continued good PO intake as tolerated. Pt declined diet education at this time. Made aware RD remains available.     Monitoring and Evaluation: Monitor PO intake, weight, labs, skin, GI status, diet     RD remains available upon request and will continue to follow-up per protocol.   Kianna Enrique MS RD CDN pager #[phone]

## 2020-08-07 NOTE — DISCHARGE NOTE NURSING/CASE MANAGEMENT/SOCIAL WORK - PATIENT PORTAL LINK FT
You can access the FollowMyHealth Patient Portal offered by [facility] by registering at the following website: [email or web address]. By joining [employer]’s FollowMyHealth portal, you will also be able to view your health information using other applications (apps) compatible with our system.

## 2020-08-07 NOTE — CHART NOTE - NSCHARTNOTESELECT_GEN_ALL_CORE
VASCULAR/Event Note
Event Note
Nutrition Services
Nutrition Services
Post-op Check/Event Note
Pre-Operative Note
Vascular

## 2020-08-07 NOTE — DISCHARGE NOTE NURSING/CASE MANAGEMENT/SOCIAL WORK - NSDCPEPT PROEDHF_GEN_ALL_CORE
5 Activities as tolerated/Report signs and symptoms to primary care provider/Call primary care provider for follow up after discharge/Monitor weight daily/Low salt diet

## 2020-08-07 NOTE — CHART NOTE - NSCHARTNOTEFT_GEN_A_CORE
Patient seen and examined this morning. Left groin access site without hematoma and pressure dressing removed. Would recommend restarting Pradaxa today. Please call with any questions.    [id number] Patient seen and examined this morning. Left groin access site without hematoma and pressure dressing removed. Would recommend restarting Pradaxa today and switch Pletal to Trental given underlying CHF. Please call with any questions.    [id number]

## 2020-08-07 NOTE — PROGRESS NOTE ADULT - ATTENDING COMMENTS
agree with the above assessment and plan by the NP  CV status remains stable s/p LE angio  cont current cardiac mgmt  abx per primary team  bumex on hold  creat noted

## 2020-08-07 NOTE — PROGRESS NOTE ADULT - ASSESSMENT
65M with PMH of CAD/MI s/p stents (~2014), T2DM on insulin c/b neuropathy, TIA, HTN, Afib on pradaxa, HFrEF (EF 40-45%) p/w RLE erythema and blisters, a/w cellulitis that has failed output abx therapy.      Problem/Plan - 1:  ·  Problem: Cellulitis of right lower extremity.    iv abs as per id  wound care   vascular f/u noted  angio done   no sx intervention as per vasc   pradaxa resumed  trental added        Problem/Plan - 2:  ·  Problem:ckd/alex     monitor Cr, avoid nephrotoxins, renally dose medications.      Problem/Plan - 3:  atrial fibrillation.   c/w meds  cards eval noted   Problem/Plan - 4:  ·  Problem: Constipation  c/w senna and miralax.      Problem/Plan - 5:  ·  Problem: Chronic systolic heart failure.  Plan: chronic HF with last TTE showing EF 40-45%  c/wmeds  c/w current meds  monitor Cr.      Problem/Plan - 6:  Problem: Type 2 diabetes mellitus with diabetic polyneuropathy, with long-term current use of insulin.   c/w home regimen of insulin -  ISS and monitor FS ac and hs  adjust for inc glucose levels  c/w lyrica for neuropathy.     Problem/Plan - 7:  ·  Problem: Essential hypertension.  Plan: BP at goal, stable   c/w home antiHTN meds       Problem/Plan - 8:  ·  Problem: CAD (coronary artery disease).  Plan: MI/CAD/ hx stents  c/w current meds    gouty flare  solumedrol 10 mg x 1 and colcys x 1

## 2020-08-07 NOTE — PROGRESS NOTE ADULT - ASSESSMENT
ASSESSMENT/PLAN:  Patient is a 65y old M w/ extensive cardiac hx and DM2 w/ neuropathy, presents w/ RLE cellulitis 2/2 to non-healing wound. Vascular consulted for evaluation of PAD and possible venous insufficiency.   impression  rle wound  may be due from a combo of arterial and venous insuff  there may be a component of AIOD     -reviewed w pt rle angio findings  continue woundcare and renal optimazation  continue pletal if pt is cleared from cardiac standpoint  if pt is not cleared then recommend trental 400 tid  will follow

## 2020-08-07 NOTE — PROGRESS NOTE ADULT - ASSESSMENT
65 year old man with PMH of CAD/MI s/p PCI 2014, T2DM on insulin c/b neuropathy, TIA, HTN, Afib on pradaxa, HFrEF (EF 40-45%) p/w RLE erythema and blisters, Dx of RLE cellulitis.     1. RLE Cellulitis   -s/p IV abx per ID  -wound care f/u  -vasc f/u  -pain management    2. PAD  -S/P RLE angiogram.  - vascular f/u noted restarted Pradaxa today and switch Pletal to Trental    3. ANT/CKD  -renal f/u  -creat elevated, Hold bumex, continue to hold metolazone     4. Chronic systolic and diastolic heart failure  -volume status stable  -continue to hold metolazone ,hold bumex     5. Atrial Fibrillation, chronic   -rates stable   -c/w metoprolol succinate ER 50mg BID   -c/w cardizem  mg daily    -a/c, pradaxa 150mg BID    6. Coronary Artery Disease. S/P PCI to LAD, LCx.  -stable, Continue ASA 81mg     7. Gout  s/p IV  steroids  and colchicine x 1  today per primary team

## 2020-08-07 NOTE — PROGRESS NOTE ADULT - ATTENDING COMMENTS
I Edward Pinto MD have personally  seen and examined the patient today and have noted the findings and formulated the plan of care.  I Edward Pinto MD have personally seen and examined the patient at bedside today at  4pm

## 2020-08-08 VITALS
OXYGEN SATURATION: 93 % | RESPIRATION RATE: 18 BRPM | TEMPERATURE: 98 F | SYSTOLIC BLOOD PRESSURE: 128 MMHG | HEART RATE: 85 BPM | DIASTOLIC BLOOD PRESSURE: 69 MMHG

## 2020-08-08 LAB
ANION GAP SERPL CALC-SCNC: 16 MMOL/L — SIGNIFICANT CHANGE UP (ref 5–17)
BUN SERPL-MCNC: 73 MG/DL — HIGH (ref 7–23)
CALCIUM SERPL-MCNC: 10 MG/DL — SIGNIFICANT CHANGE UP (ref 8.4–10.5)
CHLORIDE SERPL-SCNC: 90 MMOL/L — LOW (ref 96–108)
CO2 SERPL-SCNC: 28 MMOL/L — SIGNIFICANT CHANGE UP (ref 22–31)
CREAT SERPL-MCNC: 2.35 MG/DL — HIGH (ref 0.5–1.3)
GLUCOSE BLDC GLUCOMTR-MCNC: 302 MG/DL — HIGH (ref 70–99)
GLUCOSE BLDC GLUCOMTR-MCNC: 398 MG/DL — HIGH (ref 70–99)
GLUCOSE SERPL-MCNC: 344 MG/DL — HIGH (ref 70–99)
MAGNESIUM SERPL-MCNC: 2.4 MG/DL — SIGNIFICANT CHANGE UP (ref 1.6–2.6)
PHOSPHATE SERPL-MCNC: 4.8 MG/DL — HIGH (ref 2.5–4.5)
POTASSIUM SERPL-MCNC: 4 MMOL/L — SIGNIFICANT CHANGE UP (ref 3.5–5.3)
POTASSIUM SERPL-SCNC: 4 MMOL/L — SIGNIFICANT CHANGE UP (ref 3.5–5.3)
SODIUM SERPL-SCNC: 134 MMOL/L — LOW (ref 135–145)
URATE SERPL-MCNC: 11.8 MG/DL — HIGH (ref 3.4–8.8)

## 2020-08-08 PROCEDURE — 83036 HEMOGLOBIN GLYCOSYLATED A1C: CPT

## 2020-08-08 PROCEDURE — 76000 FLUOROSCOPY <1 HR PHYS/QHP: CPT

## 2020-08-08 PROCEDURE — 83935 ASSAY OF URINE OSMOLALITY: CPT

## 2020-08-08 PROCEDURE — 84100 ASSAY OF PHOSPHORUS: CPT

## 2020-08-08 PROCEDURE — 85014 HEMATOCRIT: CPT

## 2020-08-08 PROCEDURE — 86769 SARS-COV-2 COVID-19 ANTIBODY: CPT

## 2020-08-08 PROCEDURE — 80048 BASIC METABOLIC PNL TOTAL CA: CPT

## 2020-08-08 PROCEDURE — 86900 BLOOD TYPING SEROLOGIC ABO: CPT

## 2020-08-08 PROCEDURE — 83735 ASSAY OF MAGNESIUM: CPT

## 2020-08-08 PROCEDURE — 97116 GAIT TRAINING THERAPY: CPT

## 2020-08-08 PROCEDURE — 81001 URINALYSIS AUTO W/SCOPE: CPT

## 2020-08-08 PROCEDURE — 93970 EXTREMITY STUDY: CPT

## 2020-08-08 PROCEDURE — 84550 ASSAY OF BLOOD/URIC ACID: CPT

## 2020-08-08 PROCEDURE — U0003: CPT

## 2020-08-08 PROCEDURE — C1887: CPT

## 2020-08-08 PROCEDURE — 97161 PT EVAL LOW COMPLEX 20 MIN: CPT

## 2020-08-08 PROCEDURE — 82570 ASSAY OF URINE CREATININE: CPT

## 2020-08-08 PROCEDURE — 82803 BLOOD GASES ANY COMBINATION: CPT

## 2020-08-08 PROCEDURE — 84300 ASSAY OF URINE SODIUM: CPT

## 2020-08-08 PROCEDURE — C1760: CPT

## 2020-08-08 PROCEDURE — 82962 GLUCOSE BLOOD TEST: CPT

## 2020-08-08 PROCEDURE — 82947 ASSAY GLUCOSE BLOOD QUANT: CPT

## 2020-08-08 PROCEDURE — 80202 ASSAY OF VANCOMYCIN: CPT

## 2020-08-08 PROCEDURE — C1769: CPT

## 2020-08-08 PROCEDURE — 88304 TISSUE EXAM BY PATHOLOGIST: CPT

## 2020-08-08 PROCEDURE — C1894: CPT

## 2020-08-08 PROCEDURE — 86850 RBC ANTIBODY SCREEN: CPT

## 2020-08-08 PROCEDURE — 84132 ASSAY OF SERUM POTASSIUM: CPT

## 2020-08-08 PROCEDURE — 82435 ASSAY OF BLOOD CHLORIDE: CPT

## 2020-08-08 PROCEDURE — 80053 COMPREHEN METABOLIC PANEL: CPT

## 2020-08-08 PROCEDURE — 87040 BLOOD CULTURE FOR BACTERIA: CPT

## 2020-08-08 PROCEDURE — 93922 UPR/L XTREMITY ART 2 LEVELS: CPT

## 2020-08-08 PROCEDURE — 71045 X-RAY EXAM CHEST 1 VIEW: CPT

## 2020-08-08 PROCEDURE — 85730 THROMBOPLASTIN TIME PARTIAL: CPT

## 2020-08-08 PROCEDURE — 93923 UPR/LXTR ART STDY 3+ LVLS: CPT

## 2020-08-08 PROCEDURE — 86901 BLOOD TYPING SEROLOGIC RH(D): CPT

## 2020-08-08 PROCEDURE — 96374 THER/PROPH/DIAG INJ IV PUSH: CPT

## 2020-08-08 PROCEDURE — 99285 EMERGENCY DEPT VISIT HI MDM: CPT | Mod: 25

## 2020-08-08 PROCEDURE — 84295 ASSAY OF SERUM SODIUM: CPT

## 2020-08-08 PROCEDURE — 83605 ASSAY OF LACTIC ACID: CPT

## 2020-08-08 PROCEDURE — 83930 ASSAY OF BLOOD OSMOLALITY: CPT

## 2020-08-08 PROCEDURE — 85027 COMPLETE CBC AUTOMATED: CPT

## 2020-08-08 PROCEDURE — 82330 ASSAY OF CALCIUM: CPT

## 2020-08-08 PROCEDURE — 85610 PROTHROMBIN TIME: CPT

## 2020-08-08 RX ORDER — OXYCODONE HYDROCHLORIDE 5 MG/1
1 TABLET ORAL
Qty: 20 | Refills: 0
Start: 2020-08-08 | End: 2020-08-12

## 2020-08-08 RX ORDER — COLCHICINE 0.6 MG
0.6 TABLET ORAL ONCE
Refills: 0 | Status: COMPLETED | OUTPATIENT
Start: 2020-08-08 | End: 2020-08-08

## 2020-08-08 RX ORDER — SENNA PLUS 8.6 MG/1
2 TABLET ORAL
Qty: 0 | Refills: 0 | DISCHARGE
Start: 2020-08-08

## 2020-08-08 RX ORDER — POLYETHYLENE GLYCOL 3350 17 G/17G
17 POWDER, FOR SOLUTION ORAL
Qty: 0 | Refills: 0 | DISCHARGE
Start: 2020-08-08

## 2020-08-08 RX ORDER — OXYCODONE HYDROCHLORIDE 5 MG/1
1 TABLET ORAL
Qty: 0 | Refills: 0 | DISCHARGE
Start: 2020-08-08

## 2020-08-08 RX ORDER — PENTOXIFYLLINE 400 MG
1 TABLET, EXTENDED RELEASE ORAL
Qty: 30 | Refills: 0
Start: 2020-08-08 | End: 2020-09-06

## 2020-08-08 RX ORDER — AZTREONAM 2 G
1 VIAL (EA) INJECTION
Qty: 0 | Refills: 0 | DISCHARGE

## 2020-08-08 RX ORDER — ACETAMINOPHEN 500 MG
2 TABLET ORAL
Qty: 0 | Refills: 0 | DISCHARGE
Start: 2020-08-08

## 2020-08-08 RX ADMIN — Medication 14 UNIT(S): at 12:47

## 2020-08-08 RX ADMIN — Medication 4: at 08:41

## 2020-08-08 RX ADMIN — Medication 0.6 MILLIGRAM(S): at 11:03

## 2020-08-08 RX ADMIN — OXYCODONE HYDROCHLORIDE 5 MILLIGRAM(S): 5 TABLET ORAL at 05:35

## 2020-08-08 RX ADMIN — DABIGATRAN ETEXILATE MESYLATE 150 MILLIGRAM(S): 150 CAPSULE ORAL at 05:34

## 2020-08-08 RX ADMIN — Medication 12 UNIT(S): at 08:41

## 2020-08-08 RX ADMIN — OXYCODONE HYDROCHLORIDE 5 MILLIGRAM(S): 5 TABLET ORAL at 06:02

## 2020-08-08 RX ADMIN — Medication 5: at 12:47

## 2020-08-08 RX ADMIN — Medication 50 MILLIGRAM(S): at 05:35

## 2020-08-08 RX ADMIN — Medication 100 MILLIGRAM(S): at 05:35

## 2020-08-08 RX ADMIN — Medication 120 MILLIGRAM(S): at 05:35

## 2020-08-08 RX ADMIN — Medication 400 MILLIGRAM(S): at 12:47

## 2020-08-08 RX ADMIN — Medication 81 MILLIGRAM(S): at 12:47

## 2020-08-08 NOTE — PROGRESS NOTE ADULT - ASSESSMENT
65M with PMH of CAD/MI s/p stents (~2014), T2DM on insulin c/b neuropathy, TIA, HTN, Afib on pradaxa, HFrEF (EF 40-45%) p/w RLE erythema and blisters, a/w cellulitis that has failed output abx therapy.      Problem/Plan - 1:  ·  Problem: Cellulitis of right lower extremity.    iv abs as per id  wound care   vascular f/u noted  angio done   no sx intervention as per vasc   pradaxa resumed  trental added        Problem/Plan - 2:  ·  Problem:ckd/alex /renal fxn better    monitor Cr, avoid nephrotoxins, renally dose medications.      Problem/Plan - 3:  atrial fibrillation.   c/w meds  cards eval noted   Problem/Plan - 4:  ·  Problem: Constipation  c/w senna and miralax.      Problem/Plan - 5:  ·  Problem: Chronic systolic heart failure.  Plan: chronic HF with last TTE showing EF 40-45%  c/wmeds  c/w current meds  monitor Cr.      Problem/Plan - 6:  Problem: Type 2 diabetes mellitus with diabetic polyneuropathy, with long-term current use of insulin.   c/w home regimen of insulin -  ISS and monitor FS ac and hs  adjust for inc glucose levels  c/w lyrica for neuropathy.     Problem/Plan - 7:  ·  Problem: Essential hypertension.  Plan: BP at goal, stable   c/w home antiHTN meds       Problem/Plan - 8:  ·  Problem: CAD (coronary artery disease).  Plan: MI/CAD/ hx stents  c/w current meds    gouty flare improved  s/p   solumedrol 10 mg x 1 and colcys x 1       d/c planning home

## 2020-08-08 NOTE — PROGRESS NOTE ADULT - ASSESSMENT
65 year old man with PMH of CAD/MI s/p PCI 2014, T2DM on insulin c/b neuropathy, TIA, HTN, Afib on pradaxa, HFrEF (EF 40-45%) p/w RLE erythema and blisters, Dx of RLE cellulitis.     1. RLE Cellulitis   -s/p IV abx per ID  -wound care f/u  -vasc f/u  -pain management    2. PAD  -S/P RLE angiogram.  - vascular f/u noted restarted Pradaxa resumed, trental added     3. ANT/CKD  -renal f/u  -creat elevated but improving, likely from contrast   -resume PO bumex today, continue to hold metolazone     4. Chronic systolic and diastolic heart failure  -volume status stable, creat improving   -c/o SOB last night requiring O2   -resume PO bumex, continue to hold metolazone     5. Atrial Fibrillation, chronic   -rates stable   -c/w metoprolol succinate ER 50mg BID   -c/w cardizem  mg daily    -a/c, pradaxa 150mg BID    6. Coronary Artery Disease. S/P PCI to LAD, LCx.  -stable, Continue ASA 81mg     7. Gout  s/p IV  steroids  and colchicine  improving

## 2020-08-08 NOTE — PROGRESS NOTE ADULT - REASON FOR ADMISSION
RLE cellulitis
RLE cellulitis and rle wound
RLE cellulitis

## 2020-08-08 NOTE — PROGRESS NOTE ADULT - ASSESSMENT
65M with PMH of CAD/MI s/p stents (~2014), T2DM on insulin c/b neuropathy, TIA, HTN, Afib on pradaxa, HFrEF (EF 40-45%) p/w RLE erythema and blisters. Pknown peripheral vascular disease and neuropathy in b/l feet with significantly decreased sensation and pain. admitted for cellulitis and PAD. noticed with cr of 2.7. in 2019 last cr 1.6-1.7 mg/dl    1- ckd III   2- paraproteinemia   3- hx chf   4- HTN       cr baseline range now   resume bumex 2 mg po qd   cont cardizem cd 120 mg qd and toprol

## 2020-08-08 NOTE — PROGRESS NOTE ADULT - SUBJECTIVE AND OBJECTIVE BOX
CARDIOLOGY FOLLOW UP NOTE - DR. SAGASTUME (for Dr. DWIGHT Sagastume)    Subjective:    less leg pain    denies chest pain, dyspnea, palpitations, dizziness  ROS: otherwise negative   overnight events:      PHYSICAL EXAM:  T(C): 36.7 (07-30-20 @ 05:07), Max: 36.9 (07-29-20 @ 19:49)  HR: 85 (07-30-20 @ 05:07) (82 - 85)  BP: 119/65 (07-30-20 @ 05:07) (119/65 - 124/69)  RR: 18 (07-30-20 @ 05:07) (18 - 18)  SpO2: 95% (07-30-20 @ 05:07) (93% - 98%)  Wt(kg): --  I&O's Summary    29 Jul 2020 07:01  -  30 Jul 2020 07:00  --------------------------------------------------------  IN: 1180 mL / OUT: 2050 mL / NET: -870 mL    30 Jul 2020 07:01  -  30 Jul 2020 13:20  --------------------------------------------------------  IN: 240 mL / OUT: 400 mL / NET: -160 mL      Daily     Daily     Appearance: Normal	  Cardiovascular: Normal S1 S2,RRR, No JVD, No murmurs  Respiratory: Lungs clear to auscultation	dec bs bases  Gastrointestinal:  Soft, Non-tender, + BS	  Extremities: Normal range of motion, No clubbing, cyanosis or edema  right leg bandaged  b/l chronic skin changes    Home Medications:  aspirin 81 mg oral delayed release tablet: 1 tab(s) orally once a day (28 Jul 2020 23:25)  Bactrim  mg-160 mg oral tablet: 1 tab(s) orally every 12 hours x 5 days  (28 Jul 2020 23:25)  bumetanide 2 mg oral tablet: 1 tab(s) orally once a day (28 Jul 2020 23:25)  insulin glargine 100 units/mL subcutaneous solution: 25 unit(s) subcutaneous once a day (28 Jul 2020 23:25)  insulin lispro (concentrated) 200 units/mL subcutaneous solution: 14 unit(s) subcutaneous once a day (28 Jul 2020 23:25)  insulin lispro (concentrated) 200 units/mL subcutaneous solution: 12  subcutaneous once a day (28 Jul 2020 23:25)  metOLazone 5 mg oral tablet: 1 tab(s) orally 3 times a week, As Needed (28 Jul 2020 23:25)  metoprolol succinate 50 mg oral tablet, extended release: 1 tab(s) orally 2 times a day (28 Jul 2020 23:25)  oxycodone-acetaminophen 5 mg-325 mg oral tablet: 1 tab(s) orally once a day, As Needed (28 Jul 2020 23:25)  Pradaxa 150 mg oral capsule: 1 cap(s) orally 2 times a day (28 Jul 2020 23:25)  pregabalin 100 mg oral capsule: 1 cap(s) orally 3 times a day (28 Jul 2020 23:25)      MEDICATIONS  (STANDING):  aspirin enteric coated 81 milliGRAM(s) Oral daily  buMETAnide 2 milliGRAM(s) Oral daily  dabigatran 150 milliGRAM(s) Oral two times a day  dextrose 5%. 1000 milliLiter(s) (50 mL/Hr) IV Continuous <Continuous>  dextrose 50% Injectable 12.5 Gram(s) IV Push once  dextrose 50% Injectable 25 Gram(s) IV Push once  dextrose 50% Injectable 25 Gram(s) IV Push once  diltiazem    milliGRAM(s) Oral daily  insulin glargine Injectable (LANTUS) 25 Unit(s) SubCutaneous at bedtime  insulin lispro (HumaLOG) corrective regimen sliding scale   SubCutaneous three times a day before meals  insulin lispro (HumaLOG) corrective regimen sliding scale   SubCutaneous at bedtime  insulin lispro Injectable (HumaLOG) 12 Unit(s) SubCutaneous before breakfast  insulin lispro Injectable (HumaLOG) 14 Unit(s) SubCutaneous before lunch  insulin lispro Injectable (HumaLOG) 16 Unit(s) SubCutaneous before dinner  metolazone 5 milliGRAM(s) Oral <User Schedule>  metoprolol succinate ER 50 milliGRAM(s) Oral two times a day  piperacillin/tazobactam IVPB.. 3.375 Gram(s) IV Intermittent every 8 hours  polyethylene glycol 3350 17 Gram(s) Oral daily  pregabalin 100 milliGRAM(s) Oral three times a day  senna 2 Tablet(s) Oral at bedtime      TELEMETRY: 	    ECG:  	  RADIOLOGY:   DIAGNOSTIC TESTING:  [ ] Echocardiogram:  [ ] Catheterization:  [ ] Stress Test:    OTHER: 	    LABS:	 	    CARDIAC MARKERS:                                13.8   11.35 )-----------( 249      ( 30 Jul 2020 06:56 )             46.9     07-30    135  |  93<L>  |  50<H>  ----------------------------<  160<H>  4.6   |  28  |  2.60<H>    Ca    9.4      30 Jul 2020 06:56    TPro  7.8  /  Alb  3.5  /  TBili  0.4  /  DBili  x   /  AST  13  /  ALT  <5<L>  /  AlkPhos  88  07-30    proBNP:     Lipid Profile:   HgA1c:     Creatinine, Serum: 2.60 mg/dL (07-30-20 @ 06:56)  Creatinine, Serum: 2.73 mg/dL (07-28-20 @ 20:23)
CHIEF COMPLAINT:Patient is a 65y old  Male who presents with a chief complaint of RLE cellulitis (03 Aug 2020 08:18)    	        PAST MEDICAL & SURGICAL HISTORY:  Neuropathy  CAD (coronary artery disease)  MI (myocardial infarction): circa 2014  Atrial fibrillation  TIA (transient ischemic attack)  DM type 2 (diabetes mellitus, type 2)  HLD (hyperlipidemia)  HTN (hypertension), benign  S/P carotid endarterectomy: left  Status post angioplasty with stent: LULU x 3 2/7/2014          REVIEW OF SYSTEMS:  feels better  RESPIRATORY: No cough, wheezing, chills or hemoptysis; No Shortness of Breath  CARDIOVASCULAR: No chest pain, palpitations, passing out,   GASTROINTESTINAL: No abdominal or epigastric pain. No nausea, vomiting, or hematemesis; No diarrhea or constipation. No melena or hematochezia.  GENITOURINARY: No dysuria, frequency, hematuria, or incontinence  NEUROLOGICAL: No headaches, memory loss  less discomfort r/l ext     Medications:  MEDICATIONS  (STANDING):  aspirin enteric coated 81 milliGRAM(s) Oral daily  cilostazol 50 milliGRAM(s) Oral two times a day  dabigatran 150 milliGRAM(s) Oral two times a day  dextrose 5%. 1000 milliLiter(s) (50 mL/Hr) IV Continuous <Continuous>  dextrose 50% Injectable 12.5 Gram(s) IV Push once  dextrose 50% Injectable 25 Gram(s) IV Push once  dextrose 50% Injectable 25 Gram(s) IV Push once  diltiazem    milliGRAM(s) Oral daily  insulin glargine Injectable (LANTUS) 25 Unit(s) SubCutaneous at bedtime  insulin lispro (HumaLOG) corrective regimen sliding scale   SubCutaneous three times a day before meals  insulin lispro (HumaLOG) corrective regimen sliding scale   SubCutaneous at bedtime  insulin lispro Injectable (HumaLOG) 12 Unit(s) SubCutaneous before breakfast  insulin lispro Injectable (HumaLOG) 14 Unit(s) SubCutaneous before lunch  insulin lispro Injectable (HumaLOG) 16 Unit(s) SubCutaneous before dinner  metoprolol succinate ER 50 milliGRAM(s) Oral two times a day  piperacillin/tazobactam IVPB.. 3.375 Gram(s) IV Intermittent every 8 hours  polyethylene glycol 3350 17 Gram(s) Oral daily  pregabalin 100 milliGRAM(s) Oral three times a day  senna 2 Tablet(s) Oral at bedtime    MEDICATIONS  (PRN):  acetaminophen   Tablet .. 650 milliGRAM(s) Oral every 6 hours PRN Temp greater or equal to 38C (100.4F), Mild Pain (1 - 3)  dextrose 40% Gel 15 Gram(s) Oral once PRN Blood Glucose LESS THAN 70 milliGRAM(s)/deciliter  glucagon  Injectable 1 milliGRAM(s) IntraMuscular once PRN Glucose LESS THAN 70 milligrams/deciliter  oxyCODONE    IR 5 milliGRAM(s) Oral every 6 hours PRN moderate and severe pain    	    PHYSICAL EXAM:  T(C): 36.4 (08-03-20 @ 04:38), Max: 37.2 (08-02-20 @ 19:57)  HR: 99 (08-03-20 @ 04:38) (94 - 100)  BP: 161/71 (08-03-20 @ 04:38) (144/86 - 161/71)  RR: 18 (08-03-20 @ 04:38) (18 - 18)  SpO2: 94% (08-03-20 @ 04:38) (92% - 98%)  Wt(kg): --  I&O's Summary    02 Aug 2020 07:01  -  03 Aug 2020 07:00  --------------------------------------------------------  IN: 2340 mL / OUT: 2350 mL / NET: -10 mL    03 Aug 2020 07:01  -  03 Aug 2020 11:38  --------------------------------------------------------  IN: 360 mL / OUT: 600 mL / NET: -240 mL        Appearance: Normal	  HEENT:   Normal oral mucosa, PERRL, EOMI	  Lymphatic: No lymphadenopathy  Cardiovascular: Normal S1 S2, No JVD,   Respiratory: Lungs clear to auscultation	  Psychiatry: A & O x 3, Mood & affect appropriate  Gastrointestinal:  Soft, Non-tender, + BS	    Neurologic: Non-focal  Extremities: pvd  rlext dec swelling  dressed   pvd    TELEMETRY: 	    ECG:  	  RADIOLOGY:  OTHER: 	  	  LABS:	 	    CARDIAC MARKERS:                                13.1   10.98 )-----------( 271      ( 03 Aug 2020 06:08 )             42.7     08-03    134<L>  |  92<L>  |  64<H>  ----------------------------<  217<H>  3.7   |  24  |  2.70<H>    Ca    9.2      03 Aug 2020 06:08      proBNP:   Lipid Profile:   HgA1c:   TSH:
ID Coverage  Patient is a 65y old  Male who presents with a chief complaint of RLE cellulitis (01 Aug 2020 11:58)    Being followed by ID for RLE ulcer    Interval history:denies any leg pain  still considering vascular plan   No acute events      ROS:  No cough,SOB,CP  No N/V/D./abd pain  No other complaints      Antimicrobials:    piperacillin/tazobactam IVPB.. 3.375 Gram(s) IV Intermittent every 8 hours    Other medications reviewed    Vital Signs Last 24 Hrs  T(C): 36.4 (08-02-20 @ 04:20), Max: 36.8 (08-01-20 @ 19:52)  T(F): 97.6 (08-02-20 @ 04:20), Max: 98.2 (08-01-20 @ 19:52)  HR: 89 (08-02-20 @ 04:20) (69 - 89)  BP: 149/84 (08-02-20 @ 04:20) (117/75 - 149/84)  BP(mean): --  RR: 18 (08-02-20 @ 04:20) (18 - 18)  SpO2: 93% (08-02-20 @ 04:20) (92% - 96%)    Physical Exam:      HEENT PERRLA EOMI    No oral exudate or erythema    Chest Good AE,CTA    CVS RRR S1 S2 WNl No murmur or rub or gallop    Abd soft BS normal No tenderness no masses    IV site no erythema tenderness or discharge    Ext RLE ulcer-dressing-no discharge  erythema on leg decreased  Also stasis LLE    CNS AAO X 3 no focal    Lab Data:                          13.2   12.60 )-----------( 271      ( 02 Aug 2020 07:05 )             44.0     WBC Count: 12.60 (08-02-20 @ 07:05)  WBC Count: 15.87 (08-01-20 @ 07:15)  WBC Count: 13.95 (07-31-20 @ 06:28)  WBC Count: 11.35 (07-30-20 @ 06:56)  WBC Count: 9.87 (07-28-20 @ 20:23)    08-02    134<L>  |  92<L>  |  64<H>  ----------------------------<  234<H>  4.1   |  24  |  2.83<H>    Ca    9.5      02 Aug 2020 07:06          Culture - Blood (collected 29 Jul 2020 01:03)  Source: .Blood Blood  Preliminary Report (30 Jul 2020 02:01):    No growth to date.    Culture - Blood (collected 29 Jul 2020 01:03)  Source: .Blood Blood  Preliminary Report (30 Jul 2020 02:01):    No growth to date.            Vancomycin Level, Trough: 7.7 ug/mL (08-01-20 @ 07:15)  Vancomycin Level, Random: <4.0 ug/mL (07-31-20 @ 11:41)        < from: [facility] Physiol Extremity Lower 3+ Level, BI (07.30.20 @ 10:45) >    IMPRESSION:    Definitive significant disease is notedat the right aorto iliac/iliofemoral level. Additional small vessel disease is noted at the right first toe. The right digital brachial index is 0.42.    Mild disease is noted at the left aorto iliac/iliofemoral level. Cannot exclude additional disease at the left popliteal tibial level. Definitive disease at the left trifurcation. The left ankle-brachial index is 0.66. The left digital brachial index is 0.34.                < end of copied text >
[facility]   [phone]  RENAL FOLLOW UP NOTE  --------------------------------------------------------------------------------  Chief Complaint:    24 hour events/subjective:    seen earlier no worsening sob     PAST HISTORY  --------------------------------------------------------------------------------  No significant changes to PMH, PSH, FHx, SHx, unless otherwise noted    ALLERGIES & MEDICATIONS  --------------------------------------------------------------------------------  Allergies    No Known Allergies    Intolerances      Standing Inpatient Medications  aspirin enteric coated 81 milliGRAM(s) Oral daily  buMETAnide 2 milliGRAM(s) Oral daily  cilostazol 50 milliGRAM(s) Oral two times a day  dabigatran 150 milliGRAM(s) Oral two times a day  dextrose 5%. 1000 milliLiter(s) IV Continuous <Continuous>  dextrose 50% Injectable 12.5 Gram(s) IV Push once  dextrose 50% Injectable 25 Gram(s) IV Push once  dextrose 50% Injectable 25 Gram(s) IV Push once  diltiazem    milliGRAM(s) Oral daily  insulin glargine Injectable (LANTUS) 25 Unit(s) SubCutaneous at bedtime  insulin lispro (HumaLOG) corrective regimen sliding scale   SubCutaneous three times a day before meals  insulin lispro (HumaLOG) corrective regimen sliding scale   SubCutaneous at bedtime  insulin lispro Injectable (HumaLOG) 12 Unit(s) SubCutaneous before breakfast  insulin lispro Injectable (HumaLOG) 14 Unit(s) SubCutaneous before lunch  insulin lispro Injectable (HumaLOG) 16 Unit(s) SubCutaneous before dinner  metoprolol succinate ER 50 milliGRAM(s) Oral two times a day  polyethylene glycol 3350 17 Gram(s) Oral daily  pregabalin 100 milliGRAM(s) Oral three times a day  senna 2 Tablet(s) Oral at bedtime    PRN Inpatient Medications  acetaminophen   Tablet .. 650 milliGRAM(s) Oral every 6 hours PRN  dextrose 40% Gel 15 Gram(s) Oral once PRN  glucagon  Injectable 1 milliGRAM(s) IntraMuscular once PRN  oxyCODONE    IR 5 milliGRAM(s) Oral every 6 hours PRN      REVIEW OF SYSTEMS  --------------------------------------------------------------------------------    Gen: denies  fevers/chills,  CVS: denies chest pain/palpitations  Resp: denies SOB/Cough  GI: Denies N/V/Abd pain  : Denies dysuria/oliguria/hematuria  + pain RLE     All other systems were reviewed and are negative, except as noted.    VITALS/PHYSICAL EXAM  --------------------------------------------------------------------------------  T(C): 36.8 (08-04-20 @ 17:44), Max: 36.8 (08-03-20 @ 21:02)  HR: 70 (08-04-20 @ 17:44) (70 - 90)  BP: 169/85 (08-04-20 @ 17:44) (134/77 - 169/85)  RR: 18 (08-04-20 @ 17:44) (18 - 18)  SpO2: 95% (08-04-20 @ 17:44) (93% - 95%)  Wt(kg): --        08-03-20 @ 07:01  -  08-04-20 @ 07:00  --------------------------------------------------------  IN: 1060 mL / OUT: 1950 mL / NET: -890 mL    08-04-20 @ 07:01  -  08-04-20 @ 18:20  --------------------------------------------------------  IN: 480 mL / OUT: 1350 mL / NET: -870 mL      Physical Exam:  	        Gen: Non toxic comfortable appearing  morbidly obese   	no jvd  	Pulm: decrease bs  no rales or ronchi or wheezing  	CV: RRR, S1S2; no rub  	Abd: +BS, soft, nontender/nondistended  	: No suprapubic tenderness  	UE: Warm, no cyanosis  no clubbing,  no edema  	LE: Warm,  RLE dressing over wound  1+  edema LE   	Neuro: alert and oriented. speech coherent       LABS/STUDIES  --------------------------------------------------------------------------------              13.8   9.86  >-----------<  284      [08-04-20 @ 07:04]              45.9     136  |  94  |  61  ----------------------------<  280      [08-04-20 @ 07:04]  3.6   |  29  |  2.07        Ca     9.8     [08-04-20 @ 07:04]            Creatinine Trend:  SCr 2.07 [08-04 @ 07:04]  SCr 2.70 [08-03 @ 06:08]  SCr 2.83 [08-02 @ 07:06]  SCr 2.64 [08-01 @ 07:15]  SCr 2.79 [07-31 @ 06:28]                Urine Creatinine 82      [07-29-20 @ 05:37]  Urine Sodium 93      [07-29-20 @ 05:37]  Urine Osmolality 445      [07-29-20 @ 10:44]    HbA1c 11.7      [11-07-19 @ 09:14]
[facility]   [phone]  RENAL FOLLOW UP NOTE  --------------------------------------------------------------------------------  Chief Complaint:    24 hour events/subjective:    seen earlier. states feels well and has no sob   is s/p LE angio     PAST HISTORY  --------------------------------------------------------------------------------  No significant changes to PMH, PSH, FHx, SHx, unless otherwise noted    ALLERGIES & MEDICATIONS  --------------------------------------------------------------------------------  Allergies    No Known Allergies    Intolerances      Standing Inpatient Medications  aspirin enteric coated 81 milliGRAM(s) Oral daily  dabigatran 150 milliGRAM(s) Oral two times a day  dextrose 5%. 1000 milliLiter(s) IV Continuous <Continuous>  dextrose 50% Injectable 12.5 Gram(s) IV Push once  dextrose 50% Injectable 25 Gram(s) IV Push once  dextrose 50% Injectable 25 Gram(s) IV Push once  diltiazem    milliGRAM(s) Oral daily  insulin glargine Injectable (LANTUS) 25 Unit(s) SubCutaneous at bedtime  insulin lispro (HumaLOG) corrective regimen sliding scale   SubCutaneous three times a day before meals  insulin lispro (HumaLOG) corrective regimen sliding scale   SubCutaneous at bedtime  insulin lispro Injectable (HumaLOG) 12 Unit(s) SubCutaneous before breakfast  insulin lispro Injectable (HumaLOG) 14 Unit(s) SubCutaneous before lunch  insulin lispro Injectable (HumaLOG) 16 Unit(s) SubCutaneous before dinner  metoprolol succinate ER 50 milliGRAM(s) Oral two times a day  pentoxifylline 400 milliGRAM(s) Oral daily  polyethylene glycol 3350 17 Gram(s) Oral daily  pregabalin 100 milliGRAM(s) Oral three times a day  senna 2 Tablet(s) Oral at bedtime  sodium chloride 0.9%. 1000 milliLiter(s) IV Continuous <Continuous>    PRN Inpatient Medications  acetaminophen   Tablet .. 650 milliGRAM(s) Oral every 6 hours PRN  dextrose 40% Gel 15 Gram(s) Oral once PRN  glucagon  Injectable 1 milliGRAM(s) IntraMuscular once PRN  lidocaine   Patch 1 Patch Transdermal daily PRN  lidocaine   Patch 1 Patch Transdermal daily PRN  oxyCODONE    IR 5 milliGRAM(s) Oral every 6 hours PRN      REVIEW OF SYSTEMS  --------------------------------------------------------------------------------    Gen: denies  fevers/chills,  CVS: denies chest pain/palpitations  Resp: denies SOB/Cough  GI: Denies N/V/Abd pain  : Denies dysuria/oliguria/hematuria    All other systems were reviewed and are negative, except as noted.    VITALS/PHYSICAL EXAM  --------------------------------------------------------------------------------  T(C): 37 (08-07-20 @ 19:31), Max: 37 (08-07-20 @ 19:31)  HR: 94 (08-07-20 @ 19:31) (73 - 100)  BP: 120/68 (08-07-20 @ 19:31) (117/68 - 154/80)  RR: 18 (08-07-20 @ 19:31) (18 - 18)  SpO2: 98% (08-07-20 @ 19:31) (91% - 98%)  Wt(kg): --  Height (cm): 180.34 (08-06-20 @ 11:37)  Weight (kg): 130.6 (08-06-20 @ 11:37)  BMI (kg/m2): 40.2 (08-06-20 @ 11:37)  BSA (m2): 2.46 (08-06-20 @ 11:37)      08-06-20 @ 07:01  -  08-07-20 @ 07:00  --------------------------------------------------------  IN: 780 mL / OUT: 500 mL / NET: 280 mL    08-07-20 @ 07:01  -  08-07-20 @ 21:41  --------------------------------------------------------  IN: 1320 mL / OUT: 0 mL / NET: 1320 mL      Physical Exam:  	    Gen: Non toxic comfortable appearing  morbidly obese   	no jvd  	Pulm: decrease bs  no rales or ronchi or wheezing  	CV: RRR, S1S2; no rub  	Abd: +BS, soft, nontender/nondistended  	: No suprapubic tenderness  	UE: Warm, no cyanosis  no clubbing,  no edema  	LE: Warm,  RLE dressing over wound  1+  edema LE   	Neuro: alert and oriented. speech coherent   	    LABS/STUDIES  --------------------------------------------------------------------------------              12.7   11.77 >-----------<  350      [08-07-20 @ 05:58]              42.1     134  |  90  |  65  ----------------------------<  212      [08-07-20 @ 05:58]  4.0   |  28  |  2.57        Ca     9.7     [08-07-20 @ 05:58]      Mg     2.2     [08-07-20 @ 05:58]      Phos  5.5     [08-07-20 @ 05:58]      PT/INR: PT 17.5 , INR 1.50       [08-06-20 @ 10:30]  PTT: 52.9       [08-06-20 @ 10:30]      Creatinine Trend:  SCr 2.57 [08-07 @ 05:58]  SCr 1.88 [08-06 @ 07:39]  SCr 2.10 [08-05 @ 07:34]  SCr 2.07 [08-04 @ 07:04]  SCr 2.70 [08-03 @ 06:08]              Urinalysis - [08-05-20 @ 09:38]      Color Light Yellow / Appearance Clear / SG 1.015 / pH 6.0      Gluc 200 mg/dL / Ketone Negative  / Bili Negative / Urobili <2 mg/dL       Blood Small / Protein 100 mg/dL / Leuk Est Negative / Nitrite Negative      RBC 10 / WBC 1 / Hyaline 1 / Gran  / Sq Epi  / Non Sq Epi 0 / Bacteria Negative      HbA1c 11.7      [11-07-19 @ 09:14]
[facility]   [phone]  RENAL FOLLOW UP NOTE  --------------------------------------------------------------------------------  Chief Complaint:    24 hour events/subjective:    c/o pain RLE   no sob     PAST HISTORY  --------------------------------------------------------------------------------  No significant changes to PMH, PSH, FHx, SHx, unless otherwise noted    ALLERGIES & MEDICATIONS  --------------------------------------------------------------------------------  Allergies    No Known Allergies    Intolerances      Standing Inpatient Medications  aspirin enteric coated 81 milliGRAM(s) Oral daily  cilostazol 50 milliGRAM(s) Oral two times a day  dabigatran 150 milliGRAM(s) Oral two times a day  dextrose 5%. 1000 milliLiter(s) IV Continuous <Continuous>  dextrose 50% Injectable 12.5 Gram(s) IV Push once  dextrose 50% Injectable 25 Gram(s) IV Push once  dextrose 50% Injectable 25 Gram(s) IV Push once  diltiazem    milliGRAM(s) Oral daily  insulin glargine Injectable (LANTUS) 25 Unit(s) SubCutaneous at bedtime  insulin lispro (HumaLOG) corrective regimen sliding scale   SubCutaneous three times a day before meals  insulin lispro (HumaLOG) corrective regimen sliding scale   SubCutaneous at bedtime  insulin lispro Injectable (HumaLOG) 12 Unit(s) SubCutaneous before breakfast  insulin lispro Injectable (HumaLOG) 14 Unit(s) SubCutaneous before lunch  insulin lispro Injectable (HumaLOG) 16 Unit(s) SubCutaneous before dinner  metoprolol succinate ER 50 milliGRAM(s) Oral two times a day  piperacillin/tazobactam IVPB.. 3.375 Gram(s) IV Intermittent every 8 hours  polyethylene glycol 3350 17 Gram(s) Oral daily  pregabalin 100 milliGRAM(s) Oral three times a day  senna 2 Tablet(s) Oral at bedtime    PRN Inpatient Medications  acetaminophen   Tablet .. 650 milliGRAM(s) Oral every 6 hours PRN  dextrose 40% Gel 15 Gram(s) Oral once PRN  glucagon  Injectable 1 milliGRAM(s) IntraMuscular once PRN  oxyCODONE    IR 5 milliGRAM(s) Oral every 6 hours PRN      REVIEW OF SYSTEMS  --------------------------------------------------------------------------------    Gen: denies fevers/chills,  CVS: denies chest pain/palpitations  Resp: denies SOB/Cough  GI: Denies N/V/Abd pain  : Denies dysuria    All other systems were reviewed and are negative, except as noted.    VITALS/PHYSICAL EXAM  --------------------------------------------------------------------------------  T(C): 37.2 (08-02-20 @ 19:57), Max: 37.2 (08-02-20 @ 19:57)  HR: 100 (08-02-20 @ 19:57) (89 - 100)  BP: 144/86 (08-02-20 @ 19:57) (144/86 - 154/79)  RR: 18 (08-02-20 @ 19:57) (18 - 18)  SpO2: 92% (08-02-20 @ 19:57) (92% - 98%)  Wt(kg): --        08-01-20 @ 07:01  -  08-02-20 @ 07:00  --------------------------------------------------------  IN: 1620 mL / OUT: 1150 mL / NET: 470 mL    08-02-20 @ 07:01  -  08-02-20 @ 20:10  --------------------------------------------------------  IN: 1660 mL / OUT: 900 mL / NET: 760 mL      Physical Exam:  	    Gen: Non toxic comfortable appearing  morbidly obese   	no jvd  	Pulm: decrease bs  no rales or ronchi or wheezing  	CV: RRR, S1S2; no rub  	Abd: +BS, soft, nontender/nondistended  	: No suprapubic tenderness  	UE: Warm, no cyanosis  no clubbing,  no edema  	LE: Warm,  RLE dressing over wound  1+  edema LE   	Neuro: alert and oriented. speech coherent       LABS/STUDIES  --------------------------------------------------------------------------------              13.2   12.60 >-----------<  271      [08-02-20 @ 07:05]              44.0     134  |  92  |  64  ----------------------------<  234      [08-02-20 @ 07:06]  4.1   |  24  |  2.83        Ca     9.5     [08-02-20 @ 07:06]            Creatinine Trend:  SCr 2.83 [08-02 @ 07:06]  SCr 2.64 [08-01 @ 07:15]  SCr 2.79 [07-31 @ 06:28]  SCr 2.60 [07-30 @ 06:56]  SCr 2.73 [07-28 @ 20:23]                Urine Creatinine 82      [07-29-20 @ 05:37]  Urine Sodium 93      [07-29-20 @ 05:37]  Urine Osmolality 445      [07-29-20 @ 10:44]    HbA1c 11.7      [11-07-19 @ 09:14]
[facility]   [phone]  RENAL FOLLOW UP NOTE  --------------------------------------------------------------------------------  Chief Complaint:    24 hour events/subjective:    seen earlier. + sob overnight as per pt very slight     PAST HISTORY  --------------------------------------------------------------------------------  No significant changes to PMH, PSH, FHx, SHx, unless otherwise noted    ALLERGIES & MEDICATIONS  --------------------------------------------------------------------------------  Allergies    No Known Allergies    Intolerances      Standing Inpatient Medications  aspirin enteric coated 81 milliGRAM(s) Oral daily  cilostazol 50 milliGRAM(s) Oral two times a day  dabigatran 150 milliGRAM(s) Oral two times a day  dextrose 5%. 1000 milliLiter(s) IV Continuous <Continuous>  dextrose 50% Injectable 12.5 Gram(s) IV Push once  dextrose 50% Injectable 25 Gram(s) IV Push once  dextrose 50% Injectable 25 Gram(s) IV Push once  diltiazem    milliGRAM(s) Oral daily  insulin glargine Injectable (LANTUS) 25 Unit(s) SubCutaneous at bedtime  insulin lispro (HumaLOG) corrective regimen sliding scale   SubCutaneous three times a day before meals  insulin lispro (HumaLOG) corrective regimen sliding scale   SubCutaneous at bedtime  insulin lispro Injectable (HumaLOG) 12 Unit(s) SubCutaneous before breakfast  insulin lispro Injectable (HumaLOG) 14 Unit(s) SubCutaneous before lunch  insulin lispro Injectable (HumaLOG) 16 Unit(s) SubCutaneous before dinner  metoprolol succinate ER 50 milliGRAM(s) Oral two times a day  piperacillin/tazobactam IVPB.. 3.375 Gram(s) IV Intermittent every 8 hours  polyethylene glycol 3350 17 Gram(s) Oral daily  pregabalin 100 milliGRAM(s) Oral three times a day  senna 2 Tablet(s) Oral at bedtime    PRN Inpatient Medications  acetaminophen   Tablet .. 650 milliGRAM(s) Oral every 6 hours PRN  dextrose 40% Gel 15 Gram(s) Oral once PRN  glucagon  Injectable 1 milliGRAM(s) IntraMuscular once PRN  oxyCODONE    IR 5 milliGRAM(s) Oral every 6 hours PRN      REVIEW OF SYSTEMS  --------------------------------------------------------------------------------    Gen: denies  fevers/chills,  CVS: denies chest pain/palpitations  Resp: denies SOB/Cough  GI: Denies N/V/Abd pain  : Denies dysuria/oliguria/hematuria    All other systems were reviewed and are negative, except as noted.    VITALS/PHYSICAL EXAM  --------------------------------------------------------------------------------  T(C): 36.8 (08-03-20 @ 21:02), Max: 36.8 (08-03-20 @ 21:02)  HR: 90 (08-03-20 @ 21:02) (90 - 99)  BP: 139/71 (08-03-20 @ 21:02) (139/71 - 171/71)  RR: 18 (08-03-20 @ 21:02) (18 - 18)  SpO2: 93% (08-03-20 @ 21:02) (93% - 94%)  Wt(kg): --        08-02-20 @ 07:01  -  08-03-20 @ 07:00  --------------------------------------------------------  IN: 2340 mL / OUT: 2350 mL / NET: -10 mL    08-03-20 @ 07:01  -  08-03-20 @ 21:25  --------------------------------------------------------  IN: 720 mL / OUT: 900 mL / NET: -180 mL      Physical Exam:  	      Gen: Non toxic comfortable appearing  morbidly obese   	no jvd  	Pulm: decrease bs  no rales or ronchi or wheezing  	CV: RRR, S1S2; no rub  	Abd: +BS, soft, nontender/nondistended  	: No suprapubic tenderness  	UE: Warm, no cyanosis  no clubbing,  no edema  	LE: Warm,  RLE dressing over wound  1+  edema LE   	Neuro: alert and oriented. speech coherent       LABS/STUDIES  --------------------------------------------------------------------------------              13.1   10.98 >-----------<  271      [08-03-20 @ 06:08]              42.7     134  |  92  |  64  ----------------------------<  217      [08-03-20 @ 06:08]  3.7   |  24  |  2.70        Ca     9.2     [08-03-20 @ 06:08]            Creatinine Trend:  SCr 2.70 [08-03 @ 06:08]  SCr 2.83 [08-02 @ 07:06]  SCr 2.64 [08-01 @ 07:15]  SCr 2.79 [07-31 @ 06:28]  SCr 2.60 [07-30 @ 06:56]                Urine Creatinine 82      [07-29-20 @ 05:37]  Urine Sodium 93      [07-29-20 @ 05:37]  Urine Osmolality 445      [07-29-20 @ 10:44]    HbA1c 11.7      [11-07-19 @ 09:14]
[facility]   [phone]  RENAL FOLLOW UP NOTE  --------------------------------------------------------------------------------  Chief Complaint:    24 hour events/subjective:    seen earlier. states has no sob     PAST HISTORY  --------------------------------------------------------------------------------  No significant changes to PMH, PSH, FHx, SHx, unless otherwise noted    ALLERGIES & MEDICATIONS  --------------------------------------------------------------------------------  Allergies    No Known Allergies    Intolerances      Standing Inpatient Medications  aspirin enteric coated 81 milliGRAM(s) Oral daily  buMETAnide 2 milliGRAM(s) Oral daily  cilostazol 50 milliGRAM(s) Oral two times a day  dabigatran 150 milliGRAM(s) Oral two times a day  dextrose 5%. 1000 milliLiter(s) IV Continuous <Continuous>  dextrose 50% Injectable 12.5 Gram(s) IV Push once  dextrose 50% Injectable 25 Gram(s) IV Push once  dextrose 50% Injectable 25 Gram(s) IV Push once  diltiazem    milliGRAM(s) Oral daily  insulin glargine Injectable (LANTUS) 14 Unit(s) SubCutaneous at bedtime  insulin lispro (HumaLOG) corrective regimen sliding scale   SubCutaneous three times a day before meals  insulin lispro (HumaLOG) corrective regimen sliding scale   SubCutaneous at bedtime  insulin lispro Injectable (HumaLOG) 12 Unit(s) SubCutaneous before breakfast  insulin lispro Injectable (HumaLOG) 14 Unit(s) SubCutaneous before lunch  insulin lispro Injectable (HumaLOG) 16 Unit(s) SubCutaneous before dinner  metoprolol succinate ER 50 milliGRAM(s) Oral two times a day  polyethylene glycol 3350 17 Gram(s) Oral daily  pregabalin 100 milliGRAM(s) Oral three times a day  senna 2 Tablet(s) Oral at bedtime    PRN Inpatient Medications  acetaminophen   Tablet .. 650 milliGRAM(s) Oral every 6 hours PRN  dextrose 40% Gel 15 Gram(s) Oral once PRN  glucagon  Injectable 1 milliGRAM(s) IntraMuscular once PRN  lidocaine   Patch 1 Patch Transdermal daily PRN  lidocaine   Patch 1 Patch Transdermal daily PRN  oxyCODONE    IR 5 milliGRAM(s) Oral every 6 hours PRN      REVIEW OF SYSTEMS  --------------------------------------------------------------------------------    Gen: denies fevers/chills,  CVS: denies chest pain/palpitations  Resp: denies SOB/Cough  GI: Denies N/V/Abd pain  : Denies dysuria/oliguria/hematuria    All other systems were reviewed and are negative, except as noted.    VITALS/PHYSICAL EXAM  --------------------------------------------------------------------------------  T(C): 36.5 (08-05-20 @ 19:32), Max: 36.8 (08-05-20 @ 12:03)  HR: 87 (08-05-20 @ 19:32) (84 - 98)  BP: 148/75 (08-05-20 @ 19:32) (148/75 - 152/67)  RR: 18 (08-05-20 @ 19:32) (18 - 18)  SpO2: 96% (08-05-20 @ 19:32) (94% - 96%)  Wt(kg): --        08-04-20 @ 07:01  -  08-05-20 @ 07:00  --------------------------------------------------------  IN: 840 mL / OUT: 2900 mL / NET: -2060 mL    08-05-20 @ 07:01  -  08-05-20 @ 21:18  --------------------------------------------------------  IN: 840 mL / OUT: 1550 mL / NET: -710 mL      Physical Exam:  	    Gen: Non toxic comfortable appearing  morbidly obese   	no jvd  	Pulm: decrease bs  no rales or ronchi or wheezing  	CV: RRR, S1S2; no rub  	Abd: +BS, soft, nontender/nondistended  	: No suprapubic tenderness  	UE: Warm, no cyanosis  no clubbing,  no edema  	LE: Warm,  RLE dressing over wound  1+  edema LE   	Neuro: alert and oriented. speech coherent       LABS/STUDIES  --------------------------------------------------------------------------------              13.0   12.22 >-----------<  298      [08-05-20 @ 07:34]              43.1     135  |  92  |  60  ----------------------------<  254      [08-05-20 @ 07:34]  3.5   |  30  |  2.10        Ca     9.5     [08-05-20 @ 07:34]      Mg     2.2     [08-05-20 @ 07:34]      Phos  3.9     [08-05-20 @ 07:34]      PT/INR: PT 17.4 , INR 1.50       [08-05-20 @ 09:35]  PTT: 53.2       [08-05-20 @ 09:35]      Creatinine Trend:  SCr 2.10 [08-05 @ 07:34]  SCr 2.07 [08-04 @ 07:04]  SCr 2.70 [08-03 @ 06:08]  SCr 2.83 [08-02 @ 07:06]  SCr 2.64 [08-01 @ 07:15]              Urinalysis - [08-05-20 @ 09:38]      Color Light Yellow / Appearance Clear / SG 1.015 / pH 6.0      Gluc 200 mg/dL / Ketone Negative  / Bili Negative / Urobili <2 mg/dL       Blood Small / Protein 100 mg/dL / Leuk Est Negative / Nitrite Negative      RBC 10 / WBC 1 / Hyaline 1 / Gran  / Sq Epi  / Non Sq Epi 0 / Bacteria Negative      HbA1c 11.7      [11-07-19 @ 09:14]
[facility]   [phone]  RENAL FOLLOW UP NOTE  --------------------------------------------------------------------------------  Chief Complaint:    24 hour events/subjective:    c/o intermittent pain in leg       PAST HISTORY  --------------------------------------------------------------------------------  No significant changes to PMH, PSH, FHx, SHx, unless otherwise noted    ALLERGIES & MEDICATIONS  --------------------------------------------------------------------------------  Allergies    No Known Allergies    Intolerances      Standing Inpatient Medications  aspirin enteric coated 81 milliGRAM(s) Oral daily  buMETAnide 2 milliGRAM(s) Oral daily  dabigatran 150 milliGRAM(s) Oral two times a day  dextrose 5%. 1000 milliLiter(s) IV Continuous <Continuous>  dextrose 50% Injectable 12.5 Gram(s) IV Push once  dextrose 50% Injectable 25 Gram(s) IV Push once  dextrose 50% Injectable 25 Gram(s) IV Push once  diltiazem    milliGRAM(s) Oral daily  insulin glargine Injectable (LANTUS) 25 Unit(s) SubCutaneous at bedtime  insulin lispro (HumaLOG) corrective regimen sliding scale   SubCutaneous three times a day before meals  insulin lispro (HumaLOG) corrective regimen sliding scale   SubCutaneous at bedtime  insulin lispro Injectable (HumaLOG) 12 Unit(s) SubCutaneous before breakfast  insulin lispro Injectable (HumaLOG) 14 Unit(s) SubCutaneous before lunch  insulin lispro Injectable (HumaLOG) 16 Unit(s) SubCutaneous before dinner  metolazone 5 milliGRAM(s) Oral <User Schedule>  metoprolol succinate ER 50 milliGRAM(s) Oral two times a day  piperacillin/tazobactam IVPB.. 3.375 Gram(s) IV Intermittent every 8 hours  polyethylene glycol 3350 17 Gram(s) Oral daily  pregabalin 100 milliGRAM(s) Oral three times a day  senna 2 Tablet(s) Oral at bedtime    PRN Inpatient Medications  acetaminophen   Tablet .. 650 milliGRAM(s) Oral every 6 hours PRN  dextrose 40% Gel 15 Gram(s) Oral once PRN  glucagon  Injectable 1 milliGRAM(s) IntraMuscular once PRN  oxyCODONE    IR 5 milliGRAM(s) Oral every 6 hours PRN      REVIEW OF SYSTEMS  --------------------------------------------------------------------------------    Gen: denies fevers/chills,  CVS: denies chest pain/palpitations  Resp: denies SOB/Cough  GI: Denies N/V/Abd pain  : Denies dysuria/oliguria/hematuria    All other systems were reviewed and are negative, except as noted.    VITALS/PHYSICAL EXAM  --------------------------------------------------------------------------------  T(C): 37.2 (07-30-20 @ 19:45), Max: 37.2 (07-30-20 @ 19:45)  HR: 97 (07-30-20 @ 19:45) (72 - 97)  BP: 121/52 (07-30-20 @ 19:45) (119/65 - 128/77)  RR: 17 (07-30-20 @ 19:45) (17 - 18)  SpO2: 94% (07-30-20 @ 19:45) (94% - 99%)  Wt(kg): --        07-29-20 @ 07:01  -  07-30-20 @ 07:00  --------------------------------------------------------  IN: 1180 mL / OUT: 2050 mL / NET: -870 mL    07-30-20 @ 07:01  -  07-30-20 @ 20:39  --------------------------------------------------------  IN: 600 mL / OUT: 400 mL / NET: 200 mL      Physical Exam:  	  Gen: Non toxic comfortable appearing  morbidly obese   	no jvd  	Pulm: decrease bs  no rales or ronchi or wheezing  	CV: RRR, S1S2; no rub  	Abd: +BS, soft, nontender/nondistended  	: No suprapubic tenderness  	UE: Warm, no cyanosis  no clubbing,  no edema  	LE: Warm,  RLE dressing over wound no edema LE   	Neuro: alert and oriented. speech coherent       LABS/STUDIES  --------------------------------------------------------------------------------              13.8   11.35 >-----------<  249      [07-30-20 @ 06:56]              46.9     135  |  93  |  50  ----------------------------<  160      [07-30-20 @ 06:56]  4.6   |  28  |  2.60        Ca     9.4     [07-30-20 @ 06:56]    TPro  7.8  /  Alb  3.5  /  TBili  0.4  /  DBili  x   /  AST  13  /  ALT  <5  /  AlkPhos  88  [07-30-20 @ 06:56]        Serum Osmolality 312      [07-29-20 @ 08:34]    Creatinine Trend:  SCr 2.60 [07-30 @ 06:56]  SCr 2.73 [07-28 @ 20:23]                Urine Creatinine 82      [07-29-20 @ 05:37]  Urine Sodium 93      [07-29-20 @ 05:37]  Urine Osmolality 445      [07-29-20 @ 10:44]    HbA1c 11.7      [11-07-19 @ 09:14]
CARDIOLOGY FOLLOW UP - Dr. Mazariegos    CC no cp or sob        PHYSICAL EXAM:  T(C): 36.5 (08-05-20 @ 04:30), Max: 36.8 (08-04-20 @ 17:44)  HR: 98 (08-05-20 @ 04:30) (70 - 98)  BP: 152/67 (08-05-20 @ 04:30) (134/77 - 169/85)  RR: 18 (08-05-20 @ 04:30) (18 - 18)  SpO2: 95% (08-05-20 @ 04:30) (94% - 98%)  Wt(kg): --  I&O's Summary    04 Aug 2020 07:01  -  05 Aug 2020 07:00  --------------------------------------------------------  IN: 840 mL / OUT: 2900 mL / NET: -2060 mL    05 Aug 2020 07:01  -  05 Aug 2020 11:21  --------------------------------------------------------  IN: 360 mL / OUT: 500 mL / NET: -140 mL        Appearance: Normal	  Cardiovascular: Normal S1 S2,RRR, No JVD, No murmurs  Respiratory: Lungs clear to auscultation	  Gastrointestinal:  Soft, Non-tender, + BS	  Extremities: Normal range of motion, No clubbing, cyanosis or edema        MEDICATIONS  (STANDING):  aspirin enteric coated 81 milliGRAM(s) Oral daily  buMETAnide 2 milliGRAM(s) Oral daily  cilostazol 50 milliGRAM(s) Oral two times a day  dabigatran 150 milliGRAM(s) Oral two times a day  dextrose 5%. 1000 milliLiter(s) (50 mL/Hr) IV Continuous <Continuous>  dextrose 50% Injectable 12.5 Gram(s) IV Push once  dextrose 50% Injectable 25 Gram(s) IV Push once  dextrose 50% Injectable 25 Gram(s) IV Push once  diltiazem    milliGRAM(s) Oral daily  insulin glargine Injectable (LANTUS) 25 Unit(s) SubCutaneous at bedtime  insulin lispro (HumaLOG) corrective regimen sliding scale   SubCutaneous three times a day before meals  insulin lispro (HumaLOG) corrective regimen sliding scale   SubCutaneous at bedtime  insulin lispro Injectable (HumaLOG) 12 Unit(s) SubCutaneous before breakfast  insulin lispro Injectable (HumaLOG) 14 Unit(s) SubCutaneous before lunch  insulin lispro Injectable (HumaLOG) 16 Unit(s) SubCutaneous before dinner  metoprolol succinate ER 50 milliGRAM(s) Oral two times a day  polyethylene glycol 3350 17 Gram(s) Oral daily  pregabalin 100 milliGRAM(s) Oral three times a day  senna 2 Tablet(s) Oral at bedtime      TELEMETRY: 	    ECG:  	  RADIOLOGY:   DIAGNOSTIC TESTING:  [ ] Echocardiogram:  [ ]  Catheterization:  [ ] Stress Test:    OTHER: 	    LABS:	 	                            13.0   12.22 )-----------( 298      ( 05 Aug 2020 07:34 )             43.1     08-05    135  |  92<L>  |  60<H>  ----------------------------<  254<H>  3.5   |  30  |  2.10<H>    Ca    9.5      05 Aug 2020 07:34  Phos  3.9     08-05  Mg     2.2     08-05      PT/INR - ( 05 Aug 2020 09:35 )   PT: 17.4 sec;   INR: 1.50 ratio         PTT - ( 05 Aug 2020 09:35 )  PTT:53.2 sec        1.
CARDIOLOGY FOLLOW UP - Dr. Mazariegos    CC no cp or sob  c/p left wrist, elbow pain. Right knee pain       PHYSICAL EXAM:  T(C): 36.8 (08-07-20 @ 12:47), Max: 36.8 (08-07-20 @ 09:46)  HR: 89 (08-07-20 @ 12:47) (73 - 95)  BP: 138/84 (08-07-20 @ 12:47) (116/66 - 165/78)  RR: 18 (08-07-20 @ 12:47) (14 - 19)  SpO2: 95% (08-07-20 @ 12:47) (94% - 100%)  Wt(kg): --  I&O's Summary    06 Aug 2020 07:01  -  07 Aug 2020 07:00  --------------------------------------------------------  IN: 780 mL / OUT: 500 mL / NET: 280 mL    07 Aug 2020 07:01  -  07 Aug 2020 13:32  --------------------------------------------------------  IN: 240 mL / OUT: 0 mL / NET: 240 mL        Appearance: Normal	  Cardiovascular: Normal S1 S2,RRR, No JVD, No murmurs  Respiratory: Lungs clear to auscultation	  Gastrointestinal:  Soft, Non-tender, + BS	  Extremities: Normal range of motion, No clubbing, cyanosis or edema        MEDICATIONS  (STANDING):  aspirin enteric coated 81 milliGRAM(s) Oral daily  buMETAnide 2 milliGRAM(s) Oral daily  dabigatran 150 milliGRAM(s) Oral two times a day  dextrose 5%. 1000 milliLiter(s) (50 mL/Hr) IV Continuous <Continuous>  dextrose 50% Injectable 12.5 Gram(s) IV Push once  dextrose 50% Injectable 25 Gram(s) IV Push once  dextrose 50% Injectable 25 Gram(s) IV Push once  diltiazem    milliGRAM(s) Oral daily  insulin glargine Injectable (LANTUS) 25 Unit(s) SubCutaneous at bedtime  insulin lispro (HumaLOG) corrective regimen sliding scale   SubCutaneous three times a day before meals  insulin lispro (HumaLOG) corrective regimen sliding scale   SubCutaneous at bedtime  insulin lispro Injectable (HumaLOG) 12 Unit(s) SubCutaneous before breakfast  insulin lispro Injectable (HumaLOG) 14 Unit(s) SubCutaneous before lunch  insulin lispro Injectable (HumaLOG) 16 Unit(s) SubCutaneous before dinner  metoprolol succinate ER 50 milliGRAM(s) Oral two times a day  pentoxifylline 400 milliGRAM(s) Oral daily  polyethylene glycol 3350 17 Gram(s) Oral daily  pregabalin 100 milliGRAM(s) Oral three times a day  senna 2 Tablet(s) Oral at bedtime  sodium chloride 0.9%. 1000 milliLiter(s) (60 mL/Hr) IV Continuous <Continuous>      TELEMETRY: 	    ECG:  	  RADIOLOGY:   DIAGNOSTIC TESTING:  [ ] Echocardiogram:  [ ]  Catheterization:  [ ] Stress Test:    OTHER: 	    LABS:	 	                            12.7   11.77 )-----------( 350      ( 07 Aug 2020 05:58 )             42.1     08-07    134<L>  |  90<L>  |  65<H>  ----------------------------<  212<H>  4.0   |  28  |  2.57<H>    Ca    9.7      07 Aug 2020 05:58  Phos  5.5     08-07  Mg     2.2     08-07      PT/INR - ( 06 Aug 2020 10:30 )   PT: 17.5 sec;   INR: 1.50 ratio         PTT - ( 06 Aug 2020 10:30 )  PTT:52.9 sec        ·
CARDIOLOGY FOLLOW UP - Dr. Mazariegos    CC no cp/sob       PHYSICAL EXAM:  T(C): 36.6 (08-08-20 @ 10:46), Max: 37 (08-07-20 @ 19:31)  HR: 85 (08-08-20 @ 10:46) (81 - 101)  BP: 128/69 (08-08-20 @ 10:46) (119/67 - 164/79)  RR: 18 (08-08-20 @ 10:46) (16 - 18)  SpO2: 93% (08-08-20 @ 10:46) (91% - 98%)  Wt(kg): --  I&O's Summary    07 Aug 2020 07:01  -  08 Aug 2020 07:00  --------------------------------------------------------  IN: 1320 mL / OUT: 0 mL / NET: 1320 mL    08 Aug 2020 07:01  -  08 Aug 2020 10:52  --------------------------------------------------------  IN: 240 mL / OUT: 475 mL / NET: -235 mL        Appearance: Normal	  Cardiovascular: Normal S1 S2,RRR, No JVD, No murmurs  Respiratory: Lungs clear to auscultation	  Gastrointestinal:  Soft, Non-tender, + BS	  Extremities: Normal range of motion, No clubbing, cyanosis or edema        MEDICATIONS  (STANDING):  aspirin enteric coated 81 milliGRAM(s) Oral daily  colchicine 0.6 milliGRAM(s) Oral once  dabigatran 150 milliGRAM(s) Oral two times a day  dextrose 5%. 1000 milliLiter(s) (50 mL/Hr) IV Continuous <Continuous>  dextrose 50% Injectable 12.5 Gram(s) IV Push once  dextrose 50% Injectable 25 Gram(s) IV Push once  dextrose 50% Injectable 25 Gram(s) IV Push once  diltiazem    milliGRAM(s) Oral daily  insulin glargine Injectable (LANTUS) 25 Unit(s) SubCutaneous at bedtime  insulin lispro (HumaLOG) corrective regimen sliding scale   SubCutaneous three times a day before meals  insulin lispro (HumaLOG) corrective regimen sliding scale   SubCutaneous at bedtime  insulin lispro Injectable (HumaLOG) 12 Unit(s) SubCutaneous before breakfast  insulin lispro Injectable (HumaLOG) 14 Unit(s) SubCutaneous before lunch  insulin lispro Injectable (HumaLOG) 16 Unit(s) SubCutaneous before dinner  metoprolol succinate ER 50 milliGRAM(s) Oral two times a day  pentoxifylline 400 milliGRAM(s) Oral daily  polyethylene glycol 3350 17 Gram(s) Oral daily  pregabalin 100 milliGRAM(s) Oral three times a day  senna 2 Tablet(s) Oral at bedtime  sodium chloride 0.9%. 1000 milliLiter(s) (60 mL/Hr) IV Continuous <Continuous>  sodium chloride 0.9%. 1000 milliLiter(s) (60 mL/Hr) IV Continuous <Continuous>      TELEMETRY: 	    ECG:  	  RADIOLOGY:   DIAGNOSTIC TESTING:  [ ] Echocardiogram:  [ ]  Catheterization:  [ ] Stress Test:    OTHER: 	    LABS:	 	                                12.7   11.77 )-----------( 350      ( 07 Aug 2020 05:58 )             42.1     08-08    134<L>  |  90<L>  |  73<H>  ----------------------------<  344<H>  4.0   |  28  |  2.35<H>    Ca    10.0      08 Aug 2020 06:37  Phos  4.8     08-08  Mg     2.4     08-08
CARDIOLOGY FOLLOW UP - Dr. Mazariegos    CC no cp/sob   c/o le leg pain       PHYSICAL EXAM:  T(C): 36.4 (08-03-20 @ 04:38), Max: 37.2 (08-02-20 @ 19:57)  HR: 99 (08-03-20 @ 04:38) (94 - 100)  BP: 161/71 (08-03-20 @ 04:38) (144/86 - 161/71)  RR: 18 (08-03-20 @ 04:38) (18 - 18)  SpO2: 94% (08-03-20 @ 04:38) (92% - 98%)  Wt(kg): --  I&O's Summary    02 Aug 2020 07:01  -  03 Aug 2020 07:00  --------------------------------------------------------  IN: 2340 mL / OUT: 2350 mL / NET: -10 mL    03 Aug 2020 07:01  -  03 Aug 2020 11:53  --------------------------------------------------------  IN: 360 mL / OUT: 600 mL / NET: -240 mL        Appearance: Normal	  Cardiovascular: Normal S1 S2,RRR, No JVD, No murmurs  Respiratory: Lungs clear to auscultation	  Gastrointestinal:  Soft, Non-tender, + BS	  Extremities: Normal range of motion, No clubbing, b/l le hyperpigmentation, pain, rle dsg         MEDICATIONS  (STANDING):  aspirin enteric coated 81 milliGRAM(s) Oral daily  cilostazol 50 milliGRAM(s) Oral two times a day  dabigatran 150 milliGRAM(s) Oral two times a day  dextrose 5%. 1000 milliLiter(s) (50 mL/Hr) IV Continuous <Continuous>  dextrose 50% Injectable 12.5 Gram(s) IV Push once  dextrose 50% Injectable 25 Gram(s) IV Push once  dextrose 50% Injectable 25 Gram(s) IV Push once  diltiazem    milliGRAM(s) Oral daily  insulin glargine Injectable (LANTUS) 25 Unit(s) SubCutaneous at bedtime  insulin lispro (HumaLOG) corrective regimen sliding scale   SubCutaneous three times a day before meals  insulin lispro (HumaLOG) corrective regimen sliding scale   SubCutaneous at bedtime  insulin lispro Injectable (HumaLOG) 12 Unit(s) SubCutaneous before breakfast  insulin lispro Injectable (HumaLOG) 14 Unit(s) SubCutaneous before lunch  insulin lispro Injectable (HumaLOG) 16 Unit(s) SubCutaneous before dinner  metoprolol succinate ER 50 milliGRAM(s) Oral two times a day  piperacillin/tazobactam IVPB.. 3.375 Gram(s) IV Intermittent every 8 hours  polyethylene glycol 3350 17 Gram(s) Oral daily  pregabalin 100 milliGRAM(s) Oral three times a day  senna 2 Tablet(s) Oral at bedtime      TELEMETRY: 	    ECG:  	  RADIOLOGY:   DIAGNOSTIC TESTING:  [ ] Echocardiogram:  [ ]  Catheterization:  [ ] Stress Test:    OTHER: 	    LABS:	 	                                13.1   10.98 )-----------( 271      ( 03 Aug 2020 06:08 )             42.7     08-03    134<L>  |  92<L>  |  64<H>  ----------------------------<  217<H>  3.7   |  24  |  2.70<H>    Ca    9.2      03 Aug 2020 06:08          =
CARDIOLOGY FOLLOW UP - Dr. Mazariegos    CC no cp/sob   creat improved today       PHYSICAL EXAM:  T(C): 36.4 (08-04-20 @ 04:35), Max: 36.8 (08-03-20 @ 21:02)  HR: 90 (08-04-20 @ 04:35) (90 - 98)  BP: 147/79 (08-04-20 @ 04:35) (139/71 - 171/71)  RR: 18 (08-04-20 @ 04:35) (18 - 18)  SpO2: 94% (08-04-20 @ 04:35) (93% - 94%)  Wt(kg): --  I&O's Summary    03 Aug 2020 07:01  -  04 Aug 2020 07:00  --------------------------------------------------------  IN: 1060 mL / OUT: 1950 mL / NET: -890 mL    04 Aug 2020 07:01  -  04 Aug 2020 10:56  --------------------------------------------------------  IN: 240 mL / OUT: 450 mL / NET: -210 mL        Appearance: Normal	  Cardiovascular: Normal S1 S2,RRR, No JVD, No murmurs  Respiratory: Lungs clear to auscultation	  Gastrointestinal:  Soft, Non-tender, + BS	  Extremities: Normal range of motion, No clubbing, b/l le hyperpigmentation, le edema, RLE dsg       MEDICATIONS  (STANDING):  aspirin enteric coated 81 milliGRAM(s) Oral daily  buMETAnide 2 milliGRAM(s) Oral daily  cilostazol 50 milliGRAM(s) Oral two times a day  dabigatran 150 milliGRAM(s) Oral two times a day  dextrose 5%. 1000 milliLiter(s) (50 mL/Hr) IV Continuous <Continuous>  dextrose 50% Injectable 12.5 Gram(s) IV Push once  dextrose 50% Injectable 25 Gram(s) IV Push once  dextrose 50% Injectable 25 Gram(s) IV Push once  diltiazem    milliGRAM(s) Oral daily  insulin glargine Injectable (LANTUS) 25 Unit(s) SubCutaneous at bedtime  insulin lispro (HumaLOG) corrective regimen sliding scale   SubCutaneous three times a day before meals  insulin lispro (HumaLOG) corrective regimen sliding scale   SubCutaneous at bedtime  insulin lispro Injectable (HumaLOG) 12 Unit(s) SubCutaneous before breakfast  insulin lispro Injectable (HumaLOG) 14 Unit(s) SubCutaneous before lunch  insulin lispro Injectable (HumaLOG) 16 Unit(s) SubCutaneous before dinner  metoprolol succinate ER 50 milliGRAM(s) Oral two times a day  polyethylene glycol 3350 17 Gram(s) Oral daily  pregabalin 100 milliGRAM(s) Oral three times a day  senna 2 Tablet(s) Oral at bedtime      TELEMETRY: 	    ECG:  	  RADIOLOGY:   DIAGNOSTIC TESTING:  [ ] Echocardiogram:  [ ]  Catheterization:  [ ] Stress Test:    OTHER: 	    LABS:	 	                                13.8   9.86  )-----------( 284      ( 04 Aug 2020 07:04 )             45.9     08-04    136  |  94<L>  |  61<H>  ----------------------------<  280<H>  3.6   |  29  |  2.07<H>    Ca    9.8      04 Aug 2020 07:04
CARDIOLOGY FOLLOW UP - Dr. Mazariegos    CC no cp/sob  await poss rle angio poss ba/stent  thurs      PHYSICAL EXAM:  T(C): 36.5 (08-06-20 @ 09:54), Max: 36.8 (08-05-20 @ 12:03)  HR: 88 (08-06-20 @ 09:54) (84 - 89)  BP: 115/61 (08-06-20 @ 09:54) (115/61 - 154/88)  RR: 18 (08-06-20 @ 09:54) (18 - 18)  SpO2: 96% (08-06-20 @ 09:54) (92% - 96%)  Wt(kg): --  I&O's Summary    05 Aug 2020 07:01  -  06 Aug 2020 07:00  --------------------------------------------------------  IN: 840 mL / OUT: 2500 mL / NET: -1660 mL        Appearance: Normal	  Cardiovascular: Normal S1 S2,RRR, No JVD, No murmurs  Respiratory: Lungs clear to auscultation	  Gastrointestinal:  Soft, Non-tender, + BS	  Extremities: Normal range of motion, No clubbing, b/l le hyperpigmentation         MEDICATIONS  (STANDING):  aspirin enteric coated 81 milliGRAM(s) Oral daily  buMETAnide 2 milliGRAM(s) Oral daily  cilostazol 50 milliGRAM(s) Oral two times a day  dextrose 5%. 1000 milliLiter(s) (50 mL/Hr) IV Continuous <Continuous>  dextrose 50% Injectable 12.5 Gram(s) IV Push once  dextrose 50% Injectable 25 Gram(s) IV Push once  dextrose 50% Injectable 25 Gram(s) IV Push once  diltiazem    milliGRAM(s) Oral daily  insulin glargine Injectable (LANTUS) 14 Unit(s) SubCutaneous at bedtime  insulin lispro (HumaLOG) corrective regimen sliding scale   SubCutaneous every 6 hours  insulin lispro Injectable (HumaLOG) 12 Unit(s) SubCutaneous before breakfast  insulin lispro Injectable (HumaLOG) 14 Unit(s) SubCutaneous before lunch  insulin lispro Injectable (HumaLOG) 16 Unit(s) SubCutaneous before dinner  metoprolol succinate ER 50 milliGRAM(s) Oral two times a day  polyethylene glycol 3350 17 Gram(s) Oral daily  potassium chloride  10 mEq/100 mL IVPB 10 milliEquivalent(s) IV Intermittent once  pregabalin 100 milliGRAM(s) Oral three times a day  senna 2 Tablet(s) Oral at bedtime      TELEMETRY: 	    ECG:  	  RADIOLOGY:   DIAGNOSTIC TESTING:  [ ] Echocardiogram:  [ ]  Catheterization:  [ ] Stress Test:    OTHER: 	    LABS:	 	                                13.2   12.94 )-----------( 312      ( 06 Aug 2020 07:40 )             44.1     08-06    136  |  92<L>  |  58<H>  ----------------------------<  174<H>  3.4<L>   |  27  |  1.88<H>    Ca    9.6      06 Aug 2020 07:39  Phos  4.4     08-06  Mg     2.2     08-06      PT/INR - ( 05 Aug 2020 09:35 )   PT: 17.4 sec;   INR: 1.50 ratio         PTT - ( 05 Aug 2020 09:35 )  PTT:53.2 sec
CARDIOLOGY FOLLOW UP - Dr. Mazariegos    CC no cp/sob  c/o RLE pain, B/L knee pain (hx of gout)       PHYSICAL EXAM:  T(C): 37 (07-31-20 @ 05:57), Max: 37.2 (07-30-20 @ 19:45)  HR: 96 (07-31-20 @ 05:57) (72 - 97)  BP: 136/74 (07-31-20 @ 05:57) (121/52 - 136/74)  RR: 18 (07-31-20 @ 05:57) (17 - 18)  SpO2: 95% (07-31-20 @ 05:57) (94% - 99%)  Wt(kg): --  I&O's Summary    30 Jul 2020 07:01  -  31 Jul 2020 07:00  --------------------------------------------------------  IN: 1280 mL / OUT: 1350 mL / NET: -70 mL    31 Jul 2020 07:01  -  31 Jul 2020 12:27  --------------------------------------------------------  IN: 360 mL / OUT: 600 mL / NET: -240 mL        Appearance: Normal	  Cardiovascular: Normal S1 S2,RRR, No JVD, No murmurs  Respiratory: Lungs clear to auscultation	  Gastrointestinal:  Soft, Non-tender, + BS	  Extremities: Normal range of motion, No clubbing, cyanosis or edema  b/l hyperpigmentation, RLE bandage in place       MEDICATIONS  (STANDING):  aspirin enteric coated 81 milliGRAM(s) Oral daily  buMETAnide 2 milliGRAM(s) Oral daily  dabigatran 150 milliGRAM(s) Oral two times a day  dextrose 5%. 1000 milliLiter(s) (50 mL/Hr) IV Continuous <Continuous>  dextrose 50% Injectable 12.5 Gram(s) IV Push once  dextrose 50% Injectable 25 Gram(s) IV Push once  dextrose 50% Injectable 25 Gram(s) IV Push once  diltiazem    milliGRAM(s) Oral daily  insulin glargine Injectable (LANTUS) 25 Unit(s) SubCutaneous at bedtime  insulin lispro (HumaLOG) corrective regimen sliding scale   SubCutaneous three times a day before meals  insulin lispro (HumaLOG) corrective regimen sliding scale   SubCutaneous at bedtime  insulin lispro Injectable (HumaLOG) 12 Unit(s) SubCutaneous before breakfast  insulin lispro Injectable (HumaLOG) 14 Unit(s) SubCutaneous before lunch  insulin lispro Injectable (HumaLOG) 16 Unit(s) SubCutaneous before dinner  methylPREDNISolone sodium succinate Injectable 10 milliGRAM(s) IV Push once  metolazone 5 milliGRAM(s) Oral <User Schedule>  metoprolol succinate ER 50 milliGRAM(s) Oral two times a day  piperacillin/tazobactam IVPB.. 3.375 Gram(s) IV Intermittent every 8 hours  polyethylene glycol 3350 17 Gram(s) Oral daily  pregabalin 100 milliGRAM(s) Oral three times a day  senna 2 Tablet(s) Oral at bedtime      TELEMETRY: 	    ECG:  	  RADIOLOGY:   DIAGNOSTIC TESTING:  [ ] Echocardiogram:  [ ]  Catheterization:  [ ] Stress Test:    OTHER: 	    LABS:	 	                                14.2   13.95 )-----------( 249      ( 31 Jul 2020 06:28 )             47.3     07-31    135  |  92<L>  |  49<H>  ----------------------------<  160<H>  4.6   |  26  |  2.79<H>    Ca    9.6      31 Jul 2020 06:28    TPro  7.8  /  Alb  3.5  /  TBili  0.4  /  DBili  x   /  AST  13  /  ALT  <5<L>  /  AlkPhos  88  07-30
CARDIOLOGY FOLLOW UP NOTE - DR. SAGASTUME    Subjective:    denies chest pain, dyspnea, palpitations, dizziness  ROS: otherwise negative   overnight events:      PHYSICAL EXAM:  T(C): 36.4 (08-02-20 @ 04:20), Max: 36.8 (08-01-20 @ 19:52)  HR: 89 (08-02-20 @ 04:20) (69 - 89)  BP: 149/84 (08-02-20 @ 04:20) (127/75 - 149/84)  RR: 18 (08-02-20 @ 04:20) (18 - 18)  SpO2: 93% (08-02-20 @ 04:20) (92% - 93%)  Wt(kg): --  I&O's Summary    01 Aug 2020 07:01  -  02 Aug 2020 07:00  --------------------------------------------------------  IN: 1620 mL / OUT: 1150 mL / NET: 470 mL    02 Aug 2020 07:01  -  02 Aug 2020 11:32  --------------------------------------------------------  IN: 360 mL / OUT: 400 mL / NET: -40 mL      Daily     Daily     Appearance: Normal	  Cardiovascular: Normal S1 S2, rrr  Respiratory: Lungs clear to auscultation	  Gastrointestinal:  Soft, Non-tender, + BS	  Extremities: Normal range of motion, edema b/l improved      Home Medications:  aspirin 81 mg oral delayed release tablet: 1 tab(s) orally once a day (28 Jul 2020 23:25)  Bactrim  mg-160 mg oral tablet: 1 tab(s) orally every 12 hours x 5 days  (28 Jul 2020 23:25)  bumetanide 2 mg oral tablet: 1 tab(s) orally once a day (28 Jul 2020 23:25)  insulin glargine 100 units/mL subcutaneous solution: 25 unit(s) subcutaneous once a day (28 Jul 2020 23:25)  insulin lispro (concentrated) 200 units/mL subcutaneous solution: 14 unit(s) subcutaneous once a day (28 Jul 2020 23:25)  insulin lispro (concentrated) 200 units/mL subcutaneous solution: 12  subcutaneous once a day (28 Jul 2020 23:25)  metOLazone 5 mg oral tablet: 1 tab(s) orally 3 times a week, As Needed (28 Jul 2020 23:25)  metoprolol succinate 50 mg oral tablet, extended release: 1 tab(s) orally 2 times a day (28 Jul 2020 23:25)  oxycodone-acetaminophen 5 mg-325 mg oral tablet: 1 tab(s) orally once a day, As Needed (28 Jul 2020 23:25)  Pradaxa 150 mg oral capsule: 1 cap(s) orally 2 times a day (28 Jul 2020 23:25)  pregabalin 100 mg oral capsule: 1 cap(s) orally 3 times a day (28 Jul 2020 23:25)      MEDICATIONS  (STANDING):  aspirin enteric coated 81 milliGRAM(s) Oral daily  buMETAnide 2 milliGRAM(s) Oral daily  cilostazol 50 milliGRAM(s) Oral two times a day  dabigatran 150 milliGRAM(s) Oral two times a day  dextrose 5%. 1000 milliLiter(s) (50 mL/Hr) IV Continuous <Continuous>  dextrose 50% Injectable 12.5 Gram(s) IV Push once  dextrose 50% Injectable 25 Gram(s) IV Push once  dextrose 50% Injectable 25 Gram(s) IV Push once  diltiazem    milliGRAM(s) Oral daily  insulin glargine Injectable (LANTUS) 25 Unit(s) SubCutaneous at bedtime  insulin lispro (HumaLOG) corrective regimen sliding scale   SubCutaneous three times a day before meals  insulin lispro (HumaLOG) corrective regimen sliding scale   SubCutaneous at bedtime  insulin lispro Injectable (HumaLOG) 12 Unit(s) SubCutaneous before breakfast  insulin lispro Injectable (HumaLOG) 14 Unit(s) SubCutaneous before lunch  insulin lispro Injectable (HumaLOG) 16 Unit(s) SubCutaneous before dinner  metolazone 5 milliGRAM(s) Oral <User Schedule>  metoprolol succinate ER 50 milliGRAM(s) Oral two times a day  piperacillin/tazobactam IVPB.. 3.375 Gram(s) IV Intermittent every 8 hours  polyethylene glycol 3350 17 Gram(s) Oral daily  pregabalin 100 milliGRAM(s) Oral three times a day  senna 2 Tablet(s) Oral at bedtime      TELEMETRY: 	    ECG:  	  RADIOLOGY:   DIAGNOSTIC TESTING:  [ ] Echocardiogram:  [ ] Catheterization:  [ ] Stress Test:    OTHER: 	    LABS:	 	    CARDIAC MARKERS:                                13.2   12.60 )-----------( 271      ( 02 Aug 2020 07:05 )             44.0     08-02    134<L>  |  92<L>  |  64<H>  ----------------------------<  234<H>  4.1   |  24  |  2.83<H>    Ca    9.5      02 Aug 2020 07:06      proBNP:     Lipid Profile:   HgA1c:     Creatinine, Serum: 2.83 mg/dL (08-02-20 @ 07:06)  Creatinine, Serum: 2.64 mg/dL (08-01-20 @ 07:15)  Creatinine, Serum: 2.79 mg/dL (07-31-20 @ 06:28)
CARDIOLOGY FOLLOW UP NOTE - DR. SAGASTUME    Subjective:    denies chest pain, dyspnea, palpitations, dizziness  ROS: otherwise negative   overnight events:      PHYSICAL EXAM:  T(C): 37 (20 @ 05:42), Max: 37 (20 @ 20:17)  HR: 90 (20 @ 05:42) (79 - 90)  BP: 163/85 (20 @ 05:42) (156/76 - 165/76)  RR: 18 (20 @ 05:42) (18 - 18)  SpO2: 96% (20 @ 05:42) (93% - 96%)  Wt(kg): --  I&O's Summary    2020 07:01  -  01 Aug 2020 07:00  --------------------------------------------------------  IN: 1730 mL / OUT: 3420 mL / NET: -1690 mL      Daily     Daily Weight in k (2020 11:57)    Appearance: Normal	  Cardiovascular: Normal S1 S2,RRR, No JVD, No murmurs  Respiratory: Lungs clear to auscultation	  Gastrointestinal:  Soft, Non-tender, + BS	  Extremities: Normal range of motion, edema improved  right leg bandaged      Home Medications:  aspirin 81 mg oral delayed release tablet: 1 tab(s) orally once a day (2020 23:25)  Bactrim  mg-160 mg oral tablet: 1 tab(s) orally every 12 hours x 5 days  (2020 23:25)  bumetanide 2 mg oral tablet: 1 tab(s) orally once a day (2020 23:25)  insulin glargine 100 units/mL subcutaneous solution: 25 unit(s) subcutaneous once a day (2020 23:25)  insulin lispro (concentrated) 200 units/mL subcutaneous solution: 14 unit(s) subcutaneous once a day (2020 23:25)  insulin lispro (concentrated) 200 units/mL subcutaneous solution: 12  subcutaneous once a day (2020 23:25)  metOLazone 5 mg oral tablet: 1 tab(s) orally 3 times a week, As Needed (2020 23:25)  metoprolol succinate 50 mg oral tablet, extended release: 1 tab(s) orally 2 times a day (2020 23:25)  oxycodone-acetaminophen 5 mg-325 mg oral tablet: 1 tab(s) orally once a day, As Needed (2020 23:25)  Pradaxa 150 mg oral capsule: 1 cap(s) orally 2 times a day (2020 23:25)  pregabalin 100 mg oral capsule: 1 cap(s) orally 3 times a day (2020 23:25)      MEDICATIONS  (STANDING):  aspirin enteric coated 81 milliGRAM(s) Oral daily  buMETAnide 2 milliGRAM(s) Oral daily  dabigatran 150 milliGRAM(s) Oral two times a day  dextrose 5%. 1000 milliLiter(s) (50 mL/Hr) IV Continuous <Continuous>  dextrose 50% Injectable 12.5 Gram(s) IV Push once  dextrose 50% Injectable 25 Gram(s) IV Push once  dextrose 50% Injectable 25 Gram(s) IV Push once  diltiazem    milliGRAM(s) Oral daily  insulin glargine Injectable (LANTUS) 25 Unit(s) SubCutaneous at bedtime  insulin lispro (HumaLOG) corrective regimen sliding scale   SubCutaneous three times a day before meals  insulin lispro (HumaLOG) corrective regimen sliding scale   SubCutaneous at bedtime  insulin lispro Injectable (HumaLOG) 12 Unit(s) SubCutaneous before breakfast  insulin lispro Injectable (HumaLOG) 14 Unit(s) SubCutaneous before lunch  insulin lispro Injectable (HumaLOG) 16 Unit(s) SubCutaneous before dinner  metolazone 5 milliGRAM(s) Oral <User Schedule>  metoprolol succinate ER 50 milliGRAM(s) Oral two times a day  piperacillin/tazobactam IVPB.. 3.375 Gram(s) IV Intermittent every 8 hours  polyethylene glycol 3350 17 Gram(s) Oral daily  pregabalin 100 milliGRAM(s) Oral three times a day  senna 2 Tablet(s) Oral at bedtime      TELEMETRY: 	    ECG:  	  RADIOLOGY:   DIAGNOSTIC TESTING:  [ ] Echocardiogram:  [ ] Catheterization:  [ ] Stress Test:    OTHER: 	    LABS:	 	    CARDIAC MARKERS:                                13.9   15.87 )-----------( 265      ( 01 Aug 2020 07:15 )             45.8         131<L>  |  90<L>  |  56<H>  ----------------------------<  333<H>  4.2   |  24  |  2.64<H>    Ca    9.5      01 Aug 2020 07:15      proBNP:     Lipid Profile:   HgA1c:     Creatinine, Serum: 2.64 mg/dL (20 @ 07:15)  Creatinine, Serum: 2.79 mg/dL (20 @ 06:28)  Creatinine, Serum: 2.60 mg/dL (20 @ 06:56)
CHIEF COMPLAINT:Patient is a 65y old  Male who presents with a chief complaint of RLE cellulitis (01 Aug 2020 10:05)    	        PAST MEDICAL & SURGICAL HISTORY:  Neuropathy  CAD (coronary artery disease)  MI (myocardial infarction): circa 2014  Atrial fibrillation  TIA (transient ischemic attack)  DM type 2 (diabetes mellitus, type 2)  HLD (hyperlipidemia)  HTN (hypertension), benign  S/P carotid endarterectomy: left  Status post angioplasty with stent: LULU x 3 2/7/2014          REVIEW OF SYSTEMS:  knee pain much better   RESPIRATORY: No cough, wheezing, chills or hemoptysis; No Shortness of Breath  CARDIOVASCULAR: No chest pain, palpitations, passing out  GASTROINTESTINAL: No abdominal or epigastric pain. No nausea, vomiting, or hematemesis; No diarrhea or constipation. No melena or hematochezia.  GENITOURINARY: No dysuria, frequency, hematuria, or incontinence  NEUROLOGICAL: No headaches,     Medications:  MEDICATIONS  (STANDING):  aspirin enteric coated 81 milliGRAM(s) Oral daily  buMETAnide 2 milliGRAM(s) Oral daily  cilostazol 50 milliGRAM(s) Oral two times a day  dabigatran 150 milliGRAM(s) Oral two times a day  dextrose 5%. 1000 milliLiter(s) (50 mL/Hr) IV Continuous <Continuous>  dextrose 50% Injectable 12.5 Gram(s) IV Push once  dextrose 50% Injectable 25 Gram(s) IV Push once  dextrose 50% Injectable 25 Gram(s) IV Push once  diltiazem    milliGRAM(s) Oral daily  insulin glargine Injectable (LANTUS) 25 Unit(s) SubCutaneous at bedtime  insulin lispro (HumaLOG) corrective regimen sliding scale   SubCutaneous three times a day before meals  insulin lispro (HumaLOG) corrective regimen sliding scale   SubCutaneous at bedtime  insulin lispro Injectable (HumaLOG) 12 Unit(s) SubCutaneous before breakfast  insulin lispro Injectable (HumaLOG) 14 Unit(s) SubCutaneous before lunch  insulin lispro Injectable (HumaLOG) 16 Unit(s) SubCutaneous before dinner  metolazone 5 milliGRAM(s) Oral <User Schedule>  metoprolol succinate ER 50 milliGRAM(s) Oral two times a day  piperacillin/tazobactam IVPB.. 3.375 Gram(s) IV Intermittent every 8 hours  polyethylene glycol 3350 17 Gram(s) Oral daily  pregabalin 100 milliGRAM(s) Oral three times a day  senna 2 Tablet(s) Oral at bedtime    MEDICATIONS  (PRN):  acetaminophen   Tablet .. 650 milliGRAM(s) Oral every 6 hours PRN Temp greater or equal to 38C (100.4F), Mild Pain (1 - 3)  dextrose 40% Gel 15 Gram(s) Oral once PRN Blood Glucose LESS THAN 70 milliGRAM(s)/deciliter  glucagon  Injectable 1 milliGRAM(s) IntraMuscular once PRN Glucose LESS THAN 70 milligrams/deciliter  oxyCODONE    IR 5 milliGRAM(s) Oral every 6 hours PRN moderate and severe pain    	    PHYSICAL EXAM:  T(C): 37 (08-01-20 @ 05:42), Max: 37 (07-31-20 @ 20:17)  HR: 85 (08-01-20 @ 10:29) (79 - 90)  BP: 117/75 (08-01-20 @ 10:29) (117/75 - 165/76)  RR: 18 (08-01-20 @ 05:42) (18 - 18)  SpO2: 96% (08-01-20 @ 10:29) (93% - 96%)  Wt(kg): --  I&O's Summary    31 Jul 2020 07:01  -  01 Aug 2020 07:00  --------------------------------------------------------  IN: 1730 mL / OUT: 3420 mL / NET: -1690 mL    01 Aug 2020 07:01  -  01 Aug 2020 11:58  --------------------------------------------------------  IN: 480 mL / OUT: 0 mL / NET: 480 mL        Appearance: Normal	  HEENT:   Normal oral mucosa, PERRL, EOMI	  Lymphatic: No lymphadenopathy  Cardiovascular: Normal S1 S2, No JVD,   Respiratory: Lungs clear to auscultation	  Psychiatry: A & O x 3,   Gastrointestinal:  Soft, Non-tender, + BS	  	  Neurologic: Non-focal  Extremities: pvd  rlext wrapped    TELEMETRY: 	    ECG:  	  RADIOLOGY:  OTHER: 	  	  LABS:	 	    CARDIAC MARKERS:                                13.9   15.87 )-----------( 265      ( 01 Aug 2020 07:15 )             45.8     08-01    131<L>  |  90<L>  |  56<H>  ----------------------------<  333<H>  4.2   |  24  |  2.64<H>    Ca    9.5      01 Aug 2020 07:15      proBNP:   Lipid Profile:   HgA1c:   TSH:
CHIEF COMPLAINT:Patient is a 65y old  Male who presents with a chief complaint of RLE cellulitis (02 Aug 2020 11:31)    	        PAST MEDICAL & SURGICAL HISTORY:  Neuropathy  CAD (coronary artery disease)  MI (myocardial infarction): circa 2014  Atrial fibrillation  TIA (transient ischemic attack)  DM type 2 (diabetes mellitus, type 2)  HLD (hyperlipidemia)  HTN (hypertension), benign  S/P carotid endarterectomy: left  Status post angioplasty with stent: LULU x 3 2/7/2014          REVIEW OF SYSTEMS:  feels better  RESPIRATORY: No cough, wheezing, chills or hemoptysis; No Shortness of Breath  CARDIOVASCULAR: No chest pain, palpitations, passing out,   GASTROINTESTINAL: No abdominal or epigastric pain. No nausea, vomiting, or hematemesis; No diarrhea or constipation. No melena or hematochezia.  GENITOURINARY: No dysuria, frequency, hematuria, or incontinence  NEUROLOGICAL: No headaches, memory loss,  less pain in rlext     Medications:  MEDICATIONS  (STANDING):  aspirin enteric coated 81 milliGRAM(s) Oral daily  buMETAnide 2 milliGRAM(s) Oral daily  cilostazol 50 milliGRAM(s) Oral two times a day  dabigatran 150 milliGRAM(s) Oral two times a day  dextrose 5%. 1000 milliLiter(s) (50 mL/Hr) IV Continuous <Continuous>  dextrose 50% Injectable 12.5 Gram(s) IV Push once  dextrose 50% Injectable 25 Gram(s) IV Push once  dextrose 50% Injectable 25 Gram(s) IV Push once  diltiazem    milliGRAM(s) Oral daily  insulin glargine Injectable (LANTUS) 25 Unit(s) SubCutaneous at bedtime  insulin lispro (HumaLOG) corrective regimen sliding scale   SubCutaneous three times a day before meals  insulin lispro (HumaLOG) corrective regimen sliding scale   SubCutaneous at bedtime  insulin lispro Injectable (HumaLOG) 12 Unit(s) SubCutaneous before breakfast  insulin lispro Injectable (HumaLOG) 14 Unit(s) SubCutaneous before lunch  insulin lispro Injectable (HumaLOG) 16 Unit(s) SubCutaneous before dinner  metolazone 5 milliGRAM(s) Oral <User Schedule>  metoprolol succinate ER 50 milliGRAM(s) Oral two times a day  piperacillin/tazobactam IVPB.. 3.375 Gram(s) IV Intermittent every 8 hours  polyethylene glycol 3350 17 Gram(s) Oral daily  pregabalin 100 milliGRAM(s) Oral three times a day  senna 2 Tablet(s) Oral at bedtime    MEDICATIONS  (PRN):  acetaminophen   Tablet .. 650 milliGRAM(s) Oral every 6 hours PRN Temp greater or equal to 38C (100.4F), Mild Pain (1 - 3)  dextrose 40% Gel 15 Gram(s) Oral once PRN Blood Glucose LESS THAN 70 milliGRAM(s)/deciliter  glucagon  Injectable 1 milliGRAM(s) IntraMuscular once PRN Glucose LESS THAN 70 milligrams/deciliter  oxyCODONE    IR 5 milliGRAM(s) Oral every 6 hours PRN moderate and severe pain    	    PHYSICAL EXAM:  T(C): 36.4 (08-02-20 @ 04:20), Max: 36.8 (08-01-20 @ 19:52)  HR: 89 (08-02-20 @ 04:20) (69 - 89)  BP: 149/84 (08-02-20 @ 04:20) (127/75 - 149/84)  RR: 18 (08-02-20 @ 04:20) (18 - 18)  SpO2: 93% (08-02-20 @ 04:20) (92% - 93%)  Wt(kg): --  I&O's Summary    01 Aug 2020 07:01  -  02 Aug 2020 07:00  --------------------------------------------------------  IN: 1620 mL / OUT: 1150 mL / NET: 470 mL    02 Aug 2020 07:01  -  02 Aug 2020 11:55  --------------------------------------------------------  IN: 360 mL / OUT: 400 mL / NET: -40 mL        Appearance: Normal	  HEENT:   Normal oral mucosa, PERRL, EOMI	  Lymphatic: No lymphadenopathy  Cardiovascular: Normal S1 S2, No JVD,   Respiratory: Lungs clear to auscultation	  Psychiatry: A & O x 3, Mood & affect appropriate  Gastrointestinal:  Soft, Non-tender, + BS	  	  Neurologic: Non-focal  Extremities: pvd  rlext wound  less edema    TELEMETRY: 	    ECG:  	  RADIOLOGY:  OTHER: 	  	  LABS:	 	    CARDIAC MARKERS:                                13.2   12.60 )-----------( 271      ( 02 Aug 2020 07:05 )             44.0     08-02    134<L>  |  92<L>  |  64<H>  ----------------------------<  234<H>  4.1   |  24  |  2.83<H>    Ca    9.5      02 Aug 2020 07:06      proBNP:   Lipid Profile:   HgA1c:   TSH:
CHIEF COMPLAINT:Patient is a 65y old  Male who presents with a chief complaint of RLE cellulitis (04 Aug 2020 10:56)    	        PAST MEDICAL & SURGICAL HISTORY:  Neuropathy  CAD (coronary artery disease)  MI (myocardial infarction): circa 2014  Atrial fibrillation  TIA (transient ischemic attack)  DM type 2 (diabetes mellitus, type 2)  HLD (hyperlipidemia)  HTN (hypertension), benign  S/P carotid endarterectomy: left  Status post angioplasty with stent: LULU x 3 2/7/2014          REVIEW OF SYSTEMS:    NECK: No pain or stiffness  RESPIRATORY: No cough, wheezing, chills or hemoptysis; No Shortness of Breath  CARDIOVASCULAR: No chest pain, palpitations, passing out, dizziness  GASTROINTESTINAL: No abdominal or epigastric pain. No nausea, vomiting, or hematemesis; No diarrhea or constipation. No melena or hematochezia.  GENITOURINARY: No dysuria, frequency, hematuria, or incontinence  NEUROLOGICAL: No headaches,   leg fels better    Medications:  MEDICATIONS  (STANDING):  aspirin enteric coated 81 milliGRAM(s) Oral daily  buMETAnide 2 milliGRAM(s) Oral daily  cilostazol 50 milliGRAM(s) Oral two times a day  dabigatran 150 milliGRAM(s) Oral two times a day  dextrose 5%. 1000 milliLiter(s) (50 mL/Hr) IV Continuous <Continuous>  dextrose 50% Injectable 12.5 Gram(s) IV Push once  dextrose 50% Injectable 25 Gram(s) IV Push once  dextrose 50% Injectable 25 Gram(s) IV Push once  diltiazem    milliGRAM(s) Oral daily  insulin glargine Injectable (LANTUS) 25 Unit(s) SubCutaneous at bedtime  insulin lispro (HumaLOG) corrective regimen sliding scale   SubCutaneous three times a day before meals  insulin lispro (HumaLOG) corrective regimen sliding scale   SubCutaneous at bedtime  insulin lispro Injectable (HumaLOG) 12 Unit(s) SubCutaneous before breakfast  insulin lispro Injectable (HumaLOG) 14 Unit(s) SubCutaneous before lunch  insulin lispro Injectable (HumaLOG) 16 Unit(s) SubCutaneous before dinner  metoprolol succinate ER 50 milliGRAM(s) Oral two times a day  polyethylene glycol 3350 17 Gram(s) Oral daily  pregabalin 100 milliGRAM(s) Oral three times a day  senna 2 Tablet(s) Oral at bedtime    MEDICATIONS  (PRN):  acetaminophen   Tablet .. 650 milliGRAM(s) Oral every 6 hours PRN Temp greater or equal to 38C (100.4F), Mild Pain (1 - 3)  dextrose 40% Gel 15 Gram(s) Oral once PRN Blood Glucose LESS THAN 70 milliGRAM(s)/deciliter  glucagon  Injectable 1 milliGRAM(s) IntraMuscular once PRN Glucose LESS THAN 70 milligrams/deciliter  oxyCODONE    IR 5 milliGRAM(s) Oral every 6 hours PRN moderate and severe pain    	    PHYSICAL EXAM:  T(C): 36.6 (08-04-20 @ 12:06), Max: 36.8 (08-03-20 @ 21:02)  HR: 85 (08-04-20 @ 12:06) (85 - 98)  BP: 134/77 (08-04-20 @ 12:06) (134/77 - 171/71)  RR: 18 (08-04-20 @ 12:06) (18 - 18)  SpO2: 94% (08-04-20 @ 12:06) (93% - 94%)  Wt(kg): --  I&O's Summary    03 Aug 2020 07:01  -  04 Aug 2020 07:00  --------------------------------------------------------  IN: 1060 mL / OUT: 1950 mL / NET: -890 mL    04 Aug 2020 07:01  -  04 Aug 2020 12:43  --------------------------------------------------------  IN: 240 mL / OUT: 750 mL / NET: -510 mL        Appearance: Normal	  HEENT:   Normal oral mucosa, PERRL, EOMI	  Lymphatic: No lymphadenopathy  Cardiovascular: Normal S1 S2, No JVD  Respiratory: Lungs clear to auscultation	  Psychiatry: A & O x 3, Mood & affect appropriate  Gastrointestinal:  Soft, Non-tender, + BS	  Skin: No rashes, No ecchymoses, No cyanosis	  Neurologic: Non-focal  Extremities:  r leg dressed  pvd    TELEMETRY: 	    ECG:  	  RADIOLOGY:  OTHER: 	  	  LABS:	 	    CARDIAC MARKERS:                                13.8   9.86  )-----------( 284      ( 04 Aug 2020 07:04 )             45.9     08-04    136  |  94<L>  |  61<H>  ----------------------------<  280<H>  3.6   |  29  |  2.07<H>    Ca    9.8      04 Aug 2020 07:04      proBNP:   Lipid Profile:   HgA1c:   TSH:
CHIEF COMPLAINT:Patient is a 65y old  Male who presents with a chief complaint of RLE cellulitis (05 Aug 2020 11:21)    	        PAST MEDICAL & SURGICAL HISTORY:  Neuropathy  CAD (coronary artery disease)  MI (myocardial infarction): circa 2014  Atrial fibrillation  TIA (transient ischemic attack)  DM type 2 (diabetes mellitus, type 2)  HLD (hyperlipidemia)  HTN (hypertension), benign  S/P carotid endarterectomy: left  Status post angioplasty with stent: LULU x 3 2/7/2014          REVIEW OF SYSTEMS:  CONSTITUTIONAL: No fever, weight loss, or fatigue  EYES: No eye pain, visual disturbances, or discharge  NECK: No pain or stiffness  RESPIRATORY: No cough, wheezing, chills or hemoptysis; No Shortness of Breath  CARDIOVASCULAR: No chest pain, palpitations, passing out, d  GASTROINTESTINAL: No abdominal or epigastric pain. No nausea, vomiting, or hematemesis; No diarrhea or constipation. No melena or hematochezia.  GENITOURINARY: No dysuria, frequency, hematuria, or incontinence  NEUROLOGICAL: No headaches, memory loss,   c/o lower ext pain   Medications:  MEDICATIONS  (STANDING):  aspirin enteric coated 81 milliGRAM(s) Oral daily  buMETAnide 2 milliGRAM(s) Oral daily  cilostazol 50 milliGRAM(s) Oral two times a day  dabigatran 150 milliGRAM(s) Oral two times a day  dextrose 5%. 1000 milliLiter(s) (50 mL/Hr) IV Continuous <Continuous>  dextrose 50% Injectable 12.5 Gram(s) IV Push once  dextrose 50% Injectable 25 Gram(s) IV Push once  dextrose 50% Injectable 25 Gram(s) IV Push once  diltiazem    milliGRAM(s) Oral daily  insulin glargine Injectable (LANTUS) 25 Unit(s) SubCutaneous at bedtime  insulin lispro (HumaLOG) corrective regimen sliding scale   SubCutaneous three times a day before meals  insulin lispro (HumaLOG) corrective regimen sliding scale   SubCutaneous at bedtime  insulin lispro Injectable (HumaLOG) 12 Unit(s) SubCutaneous before breakfast  insulin lispro Injectable (HumaLOG) 14 Unit(s) SubCutaneous before lunch  insulin lispro Injectable (HumaLOG) 16 Unit(s) SubCutaneous before dinner  metoprolol succinate ER 50 milliGRAM(s) Oral two times a day  polyethylene glycol 3350 17 Gram(s) Oral daily  pregabalin 100 milliGRAM(s) Oral three times a day  senna 2 Tablet(s) Oral at bedtime    MEDICATIONS  (PRN):  acetaminophen   Tablet .. 650 milliGRAM(s) Oral every 6 hours PRN Temp greater or equal to 38C (100.4F), Mild Pain (1 - 3)  dextrose 40% Gel 15 Gram(s) Oral once PRN Blood Glucose LESS THAN 70 milliGRAM(s)/deciliter  glucagon  Injectable 1 milliGRAM(s) IntraMuscular once PRN Glucose LESS THAN 70 milligrams/deciliter  lidocaine   Patch 1 Patch Transdermal daily PRN pain  lidocaine   Patch 1 Patch Transdermal daily PRN pain  oxyCODONE    IR 5 milliGRAM(s) Oral every 6 hours PRN moderate and severe pain    	    PHYSICAL EXAM:  T(C): 36.5 (08-05-20 @ 04:30), Max: 36.8 (08-04-20 @ 17:44)  HR: 98 (08-05-20 @ 04:30) (70 - 98)  BP: 152/67 (08-05-20 @ 04:30) (152/67 - 169/85)  RR: 18 (08-05-20 @ 04:30) (18 - 18)  SpO2: 95% (08-05-20 @ 04:30) (95% - 98%)  Wt(kg): --  I&O's Summary    04 Aug 2020 07:01  -  05 Aug 2020 07:00  --------------------------------------------------------  IN: 840 mL / OUT: 2900 mL / NET: -2060 mL    05 Aug 2020 07:01  -  05 Aug 2020 12:23  --------------------------------------------------------  IN: 360 mL / OUT: 500 mL / NET: -140 mL        Appearance: Normal	  HEENT:   Normal oral mucosa, PERRL, EOMI	  Lymphatic: No lymphadenopathy  Cardiovascular: Normal S1 S2, No JVD  Respiratory: Lungs clear to auscultation	  Psychiatry: A & O x 3,   Gastrointestinal:  Soft, Non-tender, + BS	  	  Neurologic: Non-focal  Extremities: dec rom   p vd  r lower ext dressed    TELEMETRY: 	    ECG:  	  RADIOLOGY:  OTHER: 	  	  LABS:	 	    CARDIAC MARKERS:                                13.0   12.22 )-----------( 298      ( 05 Aug 2020 07:34 )             43.1     08-05    135  |  92<L>  |  60<H>  ----------------------------<  254<H>  3.5   |  30  |  2.10<H>    Ca    9.5      05 Aug 2020 07:34  Phos  3.9     08-05  Mg     2.2     08-05      proBNP:   Lipid Profile:   HgA1c:   TSH:
CHIEF COMPLAINT:Patient is a 65y old  Male who presents with a chief complaint of RLE cellulitis (06 Aug 2020 10:10)    	        PAST MEDICAL & SURGICAL HISTORY:  Neuropathy  CAD (coronary artery disease)  MI (myocardial infarction): circa 2014  Atrial fibrillation  TIA (transient ischemic attack)  DM type 2 (diabetes mellitus, type 2)  HLD (hyperlipidemia)  HTN (hypertension), benign  S/P carotid endarterectomy: left  Status post angioplasty with stent: LULU x 3 2/7/2014          REVIEW OF SYSTEMS:    EYES: No eye pain, visual disturbances, or discharge  NECK: No pain or stiffness  RESPIRATORY: No cough, wheezing, chills or hemoptysis; No Shortness of Breath  CARDIOVASCULAR: No chest pain, palpitations, passing out,   GASTROINTESTINAL: No abdominal or epigastric pain. No nausea, vomiting, or hematemesis; No diarrhea or constipation. No melena or hematochezia.  GENITOURINARY: No dysuria, frequency, hematuria, or incontinence  NEUROLOGICAL: No headaches,  lower ext pain better    Medications:  MEDICATIONS  (STANDING):  aspirin enteric coated 81 milliGRAM(s) Oral daily  buMETAnide 2 milliGRAM(s) Oral daily  cilostazol 50 milliGRAM(s) Oral two times a day  dextrose 5%. 1000 milliLiter(s) (50 mL/Hr) IV Continuous <Continuous>  dextrose 50% Injectable 12.5 Gram(s) IV Push once  dextrose 50% Injectable 25 Gram(s) IV Push once  dextrose 50% Injectable 25 Gram(s) IV Push once  diltiazem    milliGRAM(s) Oral daily  insulin glargine Injectable (LANTUS) 14 Unit(s) SubCutaneous at bedtime  insulin lispro (HumaLOG) corrective regimen sliding scale   SubCutaneous every 6 hours  insulin lispro Injectable (HumaLOG) 12 Unit(s) SubCutaneous before breakfast  insulin lispro Injectable (HumaLOG) 14 Unit(s) SubCutaneous before lunch  insulin lispro Injectable (HumaLOG) 16 Unit(s) SubCutaneous before dinner  metoprolol succinate ER 50 milliGRAM(s) Oral two times a day  polyethylene glycol 3350 17 Gram(s) Oral daily  pregabalin 100 milliGRAM(s) Oral three times a day  senna 2 Tablet(s) Oral at bedtime    MEDICATIONS  (PRN):  acetaminophen   Tablet .. 650 milliGRAM(s) Oral every 6 hours PRN Temp greater or equal to 38C (100.4F), Mild Pain (1 - 3)  dextrose 40% Gel 15 Gram(s) Oral once PRN Blood Glucose LESS THAN 70 milliGRAM(s)/deciliter  glucagon  Injectable 1 milliGRAM(s) IntraMuscular once PRN Glucose LESS THAN 70 milligrams/deciliter  lidocaine   Patch 1 Patch Transdermal daily PRN pain  lidocaine   Patch 1 Patch Transdermal daily PRN pain  oxyCODONE    IR 5 milliGRAM(s) Oral every 6 hours PRN moderate and severe pain    	    PHYSICAL EXAM:  T(C): 36.5 (08-06-20 @ 10:42), Max: 36.8 (08-05-20 @ 12:03)  HR: 88 (08-06-20 @ 10:42) (84 - 89)  BP: 115/61 (08-06-20 @ 10:42) (115/61 - 154/88)  RR: 18 (08-06-20 @ 10:42) (18 - 18)  SpO2: 96% (08-06-20 @ 10:42) (92% - 96%)  Wt(kg): --  I&O's Summary    05 Aug 2020 07:01  -  06 Aug 2020 07:00  --------------------------------------------------------  IN: 840 mL / OUT: 2500 mL / NET: -1660 mL        Appearance: Normal	  HEENT:   Normal oral mucosa, PERRL, EOMI	  Lymphatic: No lymphadenopathy  Cardiovascular: Normal S1 S2, No JVD,  Respiratory: Lungs clear to auscultation	  Psychiatry: A & O x 3, Mood & affect appropriate  Gastrointestinal:  Soft, Non-tender, + BS	    Neurologic: Non-focal  Extremities: dec rom  dec edema  pvd   tenderness+    TELEMETRY: 	    ECG:  	  RADIOLOGY:  OTHER: 	  	  LABS:	 	    CARDIAC MARKERS:                                13.2   12.94 )-----------( 312      ( 06 Aug 2020 07:40 )             44.1     08-06    136  |  92<L>  |  58<H>  ----------------------------<  174<H>  3.4<L>   |  27  |  1.88<H>    Ca    9.6      06 Aug 2020 07:39  Phos  4.4     08-06  Mg     2.2     08-06      proBNP:   Lipid Profile:   HgA1c:   TSH:
CHIEF COMPLAINT:Patient is a 65y old  Male who presents with a chief complaint of RLE cellulitis (06 Aug 2020 19:04)    	        PAST MEDICAL & SURGICAL HISTORY:  Neuropathy  CAD (coronary artery disease)  MI (myocardial infarction): circa 2014  Atrial fibrillation  TIA (transient ischemic attack)  DM type 2 (diabetes mellitus, type 2)  HLD (hyperlipidemia)  HTN (hypertension), benign  S/P carotid endarterectomy: left  Status post angioplasty with stent: LULU x 3 2/7/2014          REVIEW OF SYSTEMS:    NECK: No pain or stiffness  RESPIRATORY: No cough, wheezing, chills or hemoptysis; No Shortness of Breath  CARDIOVASCULAR: No chest pain, palpitations, passing out,   GASTROINTESTINAL: No abdominal or epigastric pain. No nausea, vomiting, or hematemesis; No diarrhea or constipation. No melena or hematochezia.  GENITOURINARY: No dysuria, frequency, hematuria, or incontinence  NEUROLOGICAL: No headaches,   pain in legs/knees    Medications:  MEDICATIONS  (STANDING):  aspirin enteric coated 81 milliGRAM(s) Oral daily  buMETAnide 2 milliGRAM(s) Oral daily  dabigatran 150 milliGRAM(s) Oral two times a day  dextrose 5%. 1000 milliLiter(s) (50 mL/Hr) IV Continuous <Continuous>  dextrose 50% Injectable 12.5 Gram(s) IV Push once  dextrose 50% Injectable 25 Gram(s) IV Push once  dextrose 50% Injectable 25 Gram(s) IV Push once  diltiazem    milliGRAM(s) Oral daily  insulin glargine Injectable (LANTUS) 25 Unit(s) SubCutaneous at bedtime  insulin lispro (HumaLOG) corrective regimen sliding scale   SubCutaneous three times a day before meals  insulin lispro (HumaLOG) corrective regimen sliding scale   SubCutaneous at bedtime  insulin lispro Injectable (HumaLOG) 12 Unit(s) SubCutaneous before breakfast  insulin lispro Injectable (HumaLOG) 14 Unit(s) SubCutaneous before lunch  insulin lispro Injectable (HumaLOG) 16 Unit(s) SubCutaneous before dinner  metoprolol succinate ER 50 milliGRAM(s) Oral two times a day  pentoxifylline 400 milliGRAM(s) Oral daily  polyethylene glycol 3350 17 Gram(s) Oral daily  pregabalin 100 milliGRAM(s) Oral three times a day  senna 2 Tablet(s) Oral at bedtime  sodium chloride 0.9%. 1000 milliLiter(s) (60 mL/Hr) IV Continuous <Continuous>    MEDICATIONS  (PRN):  acetaminophen   Tablet .. 650 milliGRAM(s) Oral every 6 hours PRN Temp greater or equal to 38C (100.4F), Mild Pain (1 - 3)  dextrose 40% Gel 15 Gram(s) Oral once PRN Blood Glucose LESS THAN 70 milliGRAM(s)/deciliter  glucagon  Injectable 1 milliGRAM(s) IntraMuscular once PRN Glucose LESS THAN 70 milligrams/deciliter  lidocaine   Patch 1 Patch Transdermal daily PRN pain  lidocaine   Patch 1 Patch Transdermal daily PRN pain  oxyCODONE    IR 5 milliGRAM(s) Oral every 6 hours PRN moderate and severe pain    	    PHYSICAL EXAM:  T(C): 36.8 (08-07-20 @ 12:47), Max: 36.8 (08-07-20 @ 09:46)  HR: 89 (08-07-20 @ 12:47) (73 - 95)  BP: 138/84 (08-07-20 @ 12:47) (116/66 - 165/78)  RR: 18 (08-07-20 @ 12:47) (14 - 19)  SpO2: 95% (08-07-20 @ 12:47) (94% - 100%)  Wt(kg): --  I&O's Summary    06 Aug 2020 07:01  -  07 Aug 2020 07:00  --------------------------------------------------------  IN: 780 mL / OUT: 500 mL / NET: 280 mL    07 Aug 2020 07:01  -  07 Aug 2020 13:27  --------------------------------------------------------  IN: 240 mL / OUT: 0 mL / NET: 240 mL        Appearance: Normal	  HEENT:   Normal oral mucosa, PERRL, EOMI	  Lymphatic: No lymphadenopathy  Cardiovascular: Normal S1 S2, No JVD,   Respiratory: Lungs clear to auscultation	  Psychiatry: A & O x 3, Mood & affect appropriate  Gastrointestinal:  Soft, Non-tender, + BS	    Neurologic: Non-focal  Extremities: dec rom  pvd  Otenderness    TELEMETRY: 	    ECG:  	  RADIOLOGY:  OTHER: 	  	  LABS:	 	    CARDIAC MARKERS:                                12.7   11.77 )-----------( 350      ( 07 Aug 2020 05:58 )             42.1     08-07    134<L>  |  90<L>  |  65<H>  ----------------------------<  212<H>  4.0   |  28  |  2.57<H>    Ca    9.7      07 Aug 2020 05:58  Phos  5.5     08-07  Mg     2.2     08-07      proBNP:   Lipid Profile:   HgA1c:   TSH:
CHIEF COMPLAINT:Patient is a 65y old  Male who presents with a chief complaint of RLE cellulitis (07 Aug 2020 21:39)    	        PAST MEDICAL & SURGICAL HISTORY:  Neuropathy  CAD (coronary artery disease)  MI (myocardial infarction): circa 2014  Atrial fibrillation  TIA (transient ischemic attack)  DM type 2 (diabetes mellitus, type 2)  HLD (hyperlipidemia)  HTN (hypertension), benign  S/P carotid endarterectomy: left  Status post angioplasty with stent: LULU x 3 2/7/2014            NECK: No pain or stiffness  RESPIRATORY: No cough, wheezing, chills or hemoptysis; No Shortness of Breath  CARDIOVASCULAR: No chest pain, palpitations, passing out, dizziness,  GASTROINTESTINAL: No abdominal or epigastric pain. No nausea, vomiting, or hematemesis; No diarrhea or constipation. No melena or hematochezia.  GENITOURINARY: No dysuria, frequency, hematuria, or incontinence  NEUROLOGICAL: No headaches, memory loss  leg pain improved  Medications:  MEDICATIONS  (STANDING):  aspirin enteric coated 81 milliGRAM(s) Oral daily  dabigatran 150 milliGRAM(s) Oral two times a day  dextrose 5%. 1000 milliLiter(s) (50 mL/Hr) IV Continuous <Continuous>  dextrose 50% Injectable 12.5 Gram(s) IV Push once  dextrose 50% Injectable 25 Gram(s) IV Push once  dextrose 50% Injectable 25 Gram(s) IV Push once  diltiazem    milliGRAM(s) Oral daily  insulin glargine Injectable (LANTUS) 25 Unit(s) SubCutaneous at bedtime  insulin lispro (HumaLOG) corrective regimen sliding scale   SubCutaneous three times a day before meals  insulin lispro (HumaLOG) corrective regimen sliding scale   SubCutaneous at bedtime  insulin lispro Injectable (HumaLOG) 12 Unit(s) SubCutaneous before breakfast  insulin lispro Injectable (HumaLOG) 14 Unit(s) SubCutaneous before lunch  insulin lispro Injectable (HumaLOG) 16 Unit(s) SubCutaneous before dinner  metoprolol succinate ER 50 milliGRAM(s) Oral two times a day  pentoxifylline 400 milliGRAM(s) Oral daily  polyethylene glycol 3350 17 Gram(s) Oral daily  pregabalin 100 milliGRAM(s) Oral three times a day  senna 2 Tablet(s) Oral at bedtime  sodium chloride 0.9%. 1000 milliLiter(s) (60 mL/Hr) IV Continuous <Continuous>  sodium chloride 0.9%. 1000 milliLiter(s) (60 mL/Hr) IV Continuous <Continuous>    MEDICATIONS  (PRN):  acetaminophen   Tablet .. 650 milliGRAM(s) Oral every 6 hours PRN Temp greater or equal to 38C (100.4F), Mild Pain (1 - 3)  dextrose 40% Gel 15 Gram(s) Oral once PRN Blood Glucose LESS THAN 70 milliGRAM(s)/deciliter  glucagon  Injectable 1 milliGRAM(s) IntraMuscular once PRN Glucose LESS THAN 70 milligrams/deciliter  lidocaine   Patch 1 Patch Transdermal daily PRN pain  lidocaine   Patch 1 Patch Transdermal daily PRN pain  oxyCODONE    IR 5 milliGRAM(s) Oral every 6 hours PRN moderate and severe pain    	    PHYSICAL EXAM:  T(C): 36.4 (08-08-20 @ 05:31), Max: 37 (08-07-20 @ 19:31)  HR: 81 (08-08-20 @ 05:31) (73 - 101)  BP: 164/79 (08-08-20 @ 05:31) (117/68 - 164/79)  RR: 16 (08-08-20 @ 05:31) (16 - 18)  SpO2: 96% (08-08-20 @ 05:31) (91% - 98%)  Wt(kg): --  I&O's Summary    07 Aug 2020 07:01  -  08 Aug 2020 07:00  --------------------------------------------------------  IN: 1320 mL / OUT: 0 mL / NET: 1320 mL        Appearance: Normal	  HEENT:   Normal oral mucosa, PERRL, EOMI	  Lymphatic: No lymphadenopathy  Cardiovascular: Normal S1 S2, No JVD,  Respiratory: Lungs clear to auscultation	  Psychiatry: A & O x 3, Mood & affect appropriate  Gastrointestinal:  Soft, Non-tender, + BS	    Neurologic: Non-focal  Extremities: pvd  inc rom  erythema improved  edema improved  TELEMETRY: 	    ECG:  	  RADIOLOGY:  OTHER: 	  	  LABS:	 	    CARDIAC MARKERS:                                12.7   11.77 )-----------( 350      ( 07 Aug 2020 05:58 )             42.1     08-08    134<L>  |  90<L>  |  73<H>  ----------------------------<  344<H>  4.0   |  28  |  2.35<H>    Ca    10.0      08 Aug 2020 06:37  Phos  4.8     08-08  Mg     2.4     08-08      proBNP:   Lipid Profile:   HgA1c:   TSH:
CHIEF COMPLAINT:Patient is a 65y old  Male who presents with a chief complaint of RLE cellulitis (29 Jul 2020 08:38)    	        PAST MEDICAL & SURGICAL HISTORY:  Neuropathy  CAD (coronary artery disease)  MI (myocardial infarction): circa 2014  Atrial fibrillation  TIA (transient ischemic attack)  DM type 2 (diabetes mellitus, type 2)  HLD (hyperlipidemia)  HTN (hypertension), benign  S/P carotid endarterectomy: left  Status post angioplasty with stent: LULU x 3 2/7/2014          weak  NECK: No pain or stiffness  RESPIRATORY: No cough, wheezing, chills or hemoptysis; No Shortness of Breath  CARDIOVASCULAR: No chest pain, palpitations, passing out, dizziness,  GASTROINTESTINAL: No abdominal or epigastric pain. No nausea, vomiting, or hematemesis; No diarrhea or constipation. No melena or hematochezia.  l oext pain /swelling    Medications:  MEDICATIONS  (STANDING):  aspirin enteric coated 81 milliGRAM(s) Oral daily  buMETAnide 2 milliGRAM(s) Oral daily  dabigatran 150 milliGRAM(s) Oral two times a day  dextrose 5%. 1000 milliLiter(s) (50 mL/Hr) IV Continuous <Continuous>  dextrose 50% Injectable 12.5 Gram(s) IV Push once  dextrose 50% Injectable 25 Gram(s) IV Push once  dextrose 50% Injectable 25 Gram(s) IV Push once  diltiazem    milliGRAM(s) Oral daily  insulin glargine Injectable (LANTUS) 25 Unit(s) SubCutaneous at bedtime  insulin lispro (HumaLOG) corrective regimen sliding scale   SubCutaneous three times a day before meals  insulin lispro (HumaLOG) corrective regimen sliding scale   SubCutaneous at bedtime  insulin lispro Injectable (HumaLOG) 12 Unit(s) SubCutaneous before breakfast  insulin lispro Injectable (HumaLOG) 14 Unit(s) SubCutaneous before lunch  insulin lispro Injectable (HumaLOG) 16 Unit(s) SubCutaneous before dinner  metolazone 5 milliGRAM(s) Oral <User Schedule>  metoprolol succinate ER 50 milliGRAM(s) Oral two times a day  piperacillin/tazobactam IVPB.. 3.375 Gram(s) IV Intermittent every 8 hours  polyethylene glycol 3350 17 Gram(s) Oral daily  pregabalin 100 milliGRAM(s) Oral three times a day  senna 2 Tablet(s) Oral at bedtime    MEDICATIONS  (PRN):  acetaminophen   Tablet .. 650 milliGRAM(s) Oral every 6 hours PRN Temp greater or equal to 38C (100.4F), Mild Pain (1 - 3)  dextrose 40% Gel 15 Gram(s) Oral once PRN Blood Glucose LESS THAN 70 milliGRAM(s)/deciliter  glucagon  Injectable 1 milliGRAM(s) IntraMuscular once PRN Glucose LESS THAN 70 milligrams/deciliter  oxyCODONE    IR 5 milliGRAM(s) Oral every 6 hours PRN moderate and severe pain    	    PHYSICAL EXAM:  T(C): 36.8 (07-29-20 @ 05:31), Max: 36.8 (07-29-20 @ 05:31)  HR: 84 (07-29-20 @ 05:31) (84 - 98)  BP: 152/89 (07-29-20 @ 05:31) (109/63 - 166/90)  RR: 18 (07-29-20 @ 05:31) (16 - 18)  SpO2: 95% (07-29-20 @ 05:31) (93% - 100%)  Wt(kg): --  I&O's Summary    28 Jul 2020 07:01  -  29 Jul 2020 07:00  --------------------------------------------------------  IN: 610 mL / OUT: 0 mL / NET: 610 mL        	  HEENT:   Normal oral mucosa, PERRL, EOMI	    Cardiovascular: Normal S1 S2, No JVD,  Respiratory: Lungs clear to auscultation	  Psychiatry: A & O  Gastrointestinal:  Soft, Non-tender, + BS	  Neurologic: Non-focal  Extremities:edema  r lext /redness / tender /  pvd    TELEMETRY: 	    ECG:  	  RADIOLOGY:  OTHER: 	  	  LABS:	 	    CARDIAC MARKERS:                                13.7   9.87  )-----------( 239      ( 28 Jul 2020 20:23 )             45.9     07-28    137  |  93<L>  |  51<H>  ----------------------------<  151<H>  4.9   |  27  |  2.73<H>    Ca    10.0      28 Jul 2020 20:23    TPro  8.2  /  Alb  3.8  /  TBili  0.3  /  DBili  x   /  AST  19  /  ALT  5<L>  /  AlkPhos  92  07-28    proBNP:   Lipid Profile:   HgA1c:   TSH:
CHIEF COMPLAINT:Patient is a 65y old  Male who presents with a chief complaint of RLE cellulitis (29 Jul 2020 23:14)    	        PAST MEDICAL & SURGICAL HISTORY:  Neuropathy  CAD (coronary artery disease)  MI (myocardial infarction): circa 2014  Atrial fibrillation  TIA (transient ischemic attack)  DM type 2 (diabetes mellitus, type 2)  HLD (hyperlipidemia)  HTN (hypertension), benign  S/P carotid endarterectomy: left  Status post angioplasty with stent: LULU x 3 2/7/2014          REVIEW OF SYSTEMS:    RESPIRATORY: No cough, wheezing, chills or hemoptysis; No Shortness of Breath  CARDIOVASCULAR: No chest pain, palpitations, passing out,   GASTROINTESTINAL: No abdominal or epigastric pain. No nausea, vomiting, or hematemesis; No diarrhea or constipation. No melena or hematochezia.  GENITOURINARY: No dysuria, frequency, hematuria, or incontinence  NEUROLOGICAL: No headaches  c.//o leg pain r >l    Medications:  MEDICATIONS  (STANDING):  aspirin enteric coated 81 milliGRAM(s) Oral daily  buMETAnide 2 milliGRAM(s) Oral daily  dabigatran 150 milliGRAM(s) Oral two times a day  dextrose 5%. 1000 milliLiter(s) (50 mL/Hr) IV Continuous <Continuous>  dextrose 50% Injectable 12.5 Gram(s) IV Push once  dextrose 50% Injectable 25 Gram(s) IV Push once  dextrose 50% Injectable 25 Gram(s) IV Push once  diltiazem    milliGRAM(s) Oral daily  insulin glargine Injectable (LANTUS) 25 Unit(s) SubCutaneous at bedtime  insulin lispro (HumaLOG) corrective regimen sliding scale   SubCutaneous three times a day before meals  insulin lispro (HumaLOG) corrective regimen sliding scale   SubCutaneous at bedtime  insulin lispro Injectable (HumaLOG) 12 Unit(s) SubCutaneous before breakfast  insulin lispro Injectable (HumaLOG) 14 Unit(s) SubCutaneous before lunch  insulin lispro Injectable (HumaLOG) 16 Unit(s) SubCutaneous before dinner  metolazone 5 milliGRAM(s) Oral <User Schedule>  metoprolol succinate ER 50 milliGRAM(s) Oral two times a day  piperacillin/tazobactam IVPB.. 3.375 Gram(s) IV Intermittent every 8 hours  polyethylene glycol 3350 17 Gram(s) Oral daily  pregabalin 100 milliGRAM(s) Oral three times a day  senna 2 Tablet(s) Oral at bedtime    MEDICATIONS  (PRN):  acetaminophen   Tablet .. 650 milliGRAM(s) Oral every 6 hours PRN Temp greater or equal to 38C (100.4F), Mild Pain (1 - 3)  dextrose 40% Gel 15 Gram(s) Oral once PRN Blood Glucose LESS THAN 70 milliGRAM(s)/deciliter  glucagon  Injectable 1 milliGRAM(s) IntraMuscular once PRN Glucose LESS THAN 70 milligrams/deciliter  oxyCODONE    IR 5 milliGRAM(s) Oral every 6 hours PRN moderate and severe pain    	    PHYSICAL EXAM:  T(C): 36.7 (07-30-20 @ 05:07), Max: 36.9 (07-29-20 @ 19:49)  HR: 85 (07-30-20 @ 05:07) (82 - 85)  BP: 119/65 (07-30-20 @ 05:07) (119/65 - 124/69)  RR: 18 (07-30-20 @ 05:07) (18 - 18)  SpO2: 95% (07-30-20 @ 05:07) (93% - 98%)  Wt(kg): --  I&O's Summary    29 Jul 2020 07:01  -  30 Jul 2020 07:00  --------------------------------------------------------  IN: 1180 mL / OUT: 2050 mL / NET: -870 mL    30 Jul 2020 07:01  -  30 Jul 2020 10:25  --------------------------------------------------------  IN: 240 mL / OUT: 400 mL / NET: -160 mL        Appearance: Normal	  HEENT:   Normal oral mucosa, PERRL, EOMI	  Lymphatic: No lymphadenopathy  Cardiovascular: Normal S1 S2, No JVD,   Respiratory: Lungs clear to auscultation	  Psychiatry: A & O x 3,   Gastrointestinal:  Soft, Non-tender, + BS	    Neurologic: Non-focal  Extremities:pvd  redness rlext  edema    TELEMETRY: 	    ECG:  	  RADIOLOGY:  OTHER: 	  	  LABS:	 	    CARDIAC MARKERS:                                13.8   11.35 )-----------( 249      ( 30 Jul 2020 06:56 )             46.9     07-30    135  |  93<L>  |  50<H>  ----------------------------<  160<H>  4.6   |  28  |  2.60<H>    Ca    9.4      30 Jul 2020 06:56    TPro  7.8  /  Alb  3.5  /  TBili  0.4  /  DBili  x   /  AST  13  /  ALT  <5<L>  /  AlkPhos  88  07-30    proBNP:   Lipid Profile:   HgA1c:   TSH:
CHIEF COMPLAINT:Patient is a 65y old  Male who presents with a chief complaint of RLE cellulitis and rle wound (31 Jul 2020 11:08)    	        PAST MEDICAL & SURGICAL HISTORY:  Neuropathy  CAD (coronary artery disease)  MI (myocardial infarction): circa 2014  Atrial fibrillation  TIA (transient ischemic attack)  DM type 2 (diabetes mellitus, type 2)  HLD (hyperlipidemia)  HTN (hypertension), benign  S/P carotid endarterectomy: left  Status post angioplasty with stent: LULU x 3 2/7/2014          feels knees hurting   gouty flare   NECK: No pain or stiffness  RESPIRATORY: No cough, wheezing, chills or hemoptysis; No Shortness of Breath  CARDIOVASCULAR: No chest pain, palpitations, passing out  GASTROINTESTINAL: No abdominal or epigastric pain. No nausea, vomiting, or hematemesis;  GENITOURINARY: No dysuria, frequency, hematuria, or incontinence  NEUROLOGICAL: No headaches,     Medications:  MEDICATIONS  (STANDING):  aspirin enteric coated 81 milliGRAM(s) Oral daily  buMETAnide 2 milliGRAM(s) Oral daily  dabigatran 150 milliGRAM(s) Oral two times a day  dextrose 5%. 1000 milliLiter(s) (50 mL/Hr) IV Continuous <Continuous>  dextrose 50% Injectable 12.5 Gram(s) IV Push once  dextrose 50% Injectable 25 Gram(s) IV Push once  dextrose 50% Injectable 25 Gram(s) IV Push once  diltiazem    milliGRAM(s) Oral daily  insulin glargine Injectable (LANTUS) 25 Unit(s) SubCutaneous at bedtime  insulin lispro (HumaLOG) corrective regimen sliding scale   SubCutaneous three times a day before meals  insulin lispro (HumaLOG) corrective regimen sliding scale   SubCutaneous at bedtime  insulin lispro Injectable (HumaLOG) 12 Unit(s) SubCutaneous before breakfast  insulin lispro Injectable (HumaLOG) 14 Unit(s) SubCutaneous before lunch  insulin lispro Injectable (HumaLOG) 16 Unit(s) SubCutaneous before dinner  methylPREDNISolone sodium succinate Injectable 10 milliGRAM(s) IV Push once  metolazone 5 milliGRAM(s) Oral <User Schedule>  metoprolol succinate ER 50 milliGRAM(s) Oral two times a day  piperacillin/tazobactam IVPB.. 3.375 Gram(s) IV Intermittent every 8 hours  polyethylene glycol 3350 17 Gram(s) Oral daily  pregabalin 100 milliGRAM(s) Oral three times a day  senna 2 Tablet(s) Oral at bedtime    MEDICATIONS  (PRN):  acetaminophen   Tablet .. 650 milliGRAM(s) Oral every 6 hours PRN Temp greater or equal to 38C (100.4F), Mild Pain (1 - 3)  dextrose 40% Gel 15 Gram(s) Oral once PRN Blood Glucose LESS THAN 70 milliGRAM(s)/deciliter  glucagon  Injectable 1 milliGRAM(s) IntraMuscular once PRN Glucose LESS THAN 70 milligrams/deciliter  oxyCODONE    IR 5 milliGRAM(s) Oral every 6 hours PRN moderate and severe pain    	    PHYSICAL EXAM:  T(C): 37 (07-31-20 @ 05:57), Max: 37.2 (07-30-20 @ 19:45)  HR: 96 (07-31-20 @ 05:57) (72 - 97)  BP: 136/74 (07-31-20 @ 05:57) (121/52 - 136/74)  RR: 18 (07-31-20 @ 05:57) (17 - 18)  SpO2: 95% (07-31-20 @ 05:57) (94% - 99%)  Wt(kg): --  I&O's Summary    30 Jul 2020 07:01  -  31 Jul 2020 07:00  --------------------------------------------------------  IN: 1280 mL / OUT: 1350 mL / NET: -70 mL    31 Jul 2020 07:01  -  31 Jul 2020 11:44  --------------------------------------------------------  IN: 360 mL / OUT: 600 mL / NET: -240 mL        Appearance: Normal	  HEENT:   Normal oral mucosa, 	  Cardiovascular: Normal S1 S2, No JVD  Respiratory: Lungs clear to auscultation	  Psychiatry: A & O  Gastrointestinal:  Soft, Non-tender, + BS		  Neurologic: Non-focal  Extremities: r rene hillt dressed  pvd     TELEMETRY: 	    ECG:  	  RADIOLOGY:  OTHER: 	  	  LABS:	 	    CARDIAC MARKERS:                                14.2   13.95 )-----------( 249      ( 31 Jul 2020 06:28 )             47.3     07-31    135  |  92<L>  |  49<H>  ----------------------------<  160<H>  4.6   |  26  |  2.79<H>    Ca    9.6      31 Jul 2020 06:28    TPro  7.8  /  Alb  3.5  /  TBili  0.4  /  DBili  x   /  AST  13  /  ALT  <5<L>  /  AlkPhos  88  07-30    proBNP:   Lipid Profile:   HgA1c:   TSH:
[facility]   [phone]  RENAL FOLLOW UP NOTE  --------------------------------------------------------------------------------  Chief Complaint:    24 hour events/subjective:    seen earlier.   states feels well and has no sob     PAST HISTORY  --------------------------------------------------------------------------------  No significant changes to PMH, PSH, FHx, SHx, unless otherwise noted    ALLERGIES & MEDICATIONS  --------------------------------------------------------------------------------  Allergies    No Known Allergies    Intolerances      Standing Inpatient Medications  aspirin enteric coated 81 milliGRAM(s) Oral daily  dabigatran 150 milliGRAM(s) Oral two times a day  dextrose 5%. 1000 milliLiter(s) IV Continuous <Continuous>  dextrose 50% Injectable 12.5 Gram(s) IV Push once  dextrose 50% Injectable 25 Gram(s) IV Push once  dextrose 50% Injectable 25 Gram(s) IV Push once  diltiazem    milliGRAM(s) Oral daily  insulin glargine Injectable (LANTUS) 25 Unit(s) SubCutaneous at bedtime  insulin lispro (HumaLOG) corrective regimen sliding scale   SubCutaneous three times a day before meals  insulin lispro (HumaLOG) corrective regimen sliding scale   SubCutaneous at bedtime  insulin lispro Injectable (HumaLOG) 12 Unit(s) SubCutaneous before breakfast  insulin lispro Injectable (HumaLOG) 14 Unit(s) SubCutaneous before lunch  insulin lispro Injectable (HumaLOG) 16 Unit(s) SubCutaneous before dinner  metoprolol succinate ER 50 milliGRAM(s) Oral two times a day  pentoxifylline 400 milliGRAM(s) Oral daily  polyethylene glycol 3350 17 Gram(s) Oral daily  pregabalin 100 milliGRAM(s) Oral three times a day  senna 2 Tablet(s) Oral at bedtime  sodium chloride 0.9%. 1000 milliLiter(s) IV Continuous <Continuous>  sodium chloride 0.9%. 1000 milliLiter(s) IV Continuous <Continuous>    PRN Inpatient Medications  acetaminophen   Tablet .. 650 milliGRAM(s) Oral every 6 hours PRN  dextrose 40% Gel 15 Gram(s) Oral once PRN  glucagon  Injectable 1 milliGRAM(s) IntraMuscular once PRN  lidocaine   Patch 1 Patch Transdermal daily PRN  lidocaine   Patch 1 Patch Transdermal daily PRN  oxyCODONE    IR 5 milliGRAM(s) Oral every 6 hours PRN      REVIEW OF SYSTEMS  --------------------------------------------------------------------------------    Gen: denies  fevers/chills,  CVS: denies chest pain/palpitations  Resp: denies SOB/Cough  GI: Denies N/V/Abd pain  : Denies dysuria/oliguria/hematuria    All other systems were reviewed and are negative, except as noted.    VITALS/PHYSICAL EXAM  --------------------------------------------------------------------------------  T(C): 36.6 (08-08-20 @ 10:46), Max: 37 (08-07-20 @ 19:31)  HR: 85 (08-08-20 @ 10:46) (81 - 101)  BP: 128/69 (08-08-20 @ 10:46) (120/68 - 164/79)  RR: 18 (08-08-20 @ 10:46) (16 - 18)  SpO2: 93% (08-08-20 @ 10:46) (93% - 98%)  Wt(kg): --        08-07-20 @ 07:01  -  08-08-20 @ 07:00  --------------------------------------------------------  IN: 1320 mL / OUT: 0 mL / NET: 1320 mL    08-08-20 @ 07:01  -  08-08-20 @ 17:10  --------------------------------------------------------  IN: 240 mL / OUT: 475 mL / NET: -235 mL      Physical Exam:  	    Gen: Non toxic comfortable appearing  morbidly obese   	no jvd  	Pulm: decrease bs  no rales or ronchi or wheezing  	CV: RRR, S1S2; no rub  	Abd: +BS, soft, nontender/nondistended  	: No suprapubic tenderness  	UE: Warm, no cyanosis  no clubbing,  no edema  	LE: Warm,  RLE dressing over wound  1+  edema LE   	Neuro: alert and oriented. speech coherent     LABS/STUDIES  --------------------------------------------------------------------------------              12.7   11.77 >-----------<  350      [08-07-20 @ 05:58]              42.1     134  |  90  |  73  ----------------------------<  344      [08-08-20 @ 06:37]  4.0   |  28  |  2.35        Ca     10.0     [08-08-20 @ 06:37]      Mg     2.4     [08-08-20 @ 06:37]      Phos  4.8     [08-08-20 @ 06:37]          Uric acid 11.8      [08-08-20 @ 06:37]    Creatinine Trend:  SCr 2.35 [08-08 @ 06:37]  SCr 2.57 [08-07 @ 05:58]  SCr 1.88 [08-06 @ 07:39]  SCr 2.10 [08-05 @ 07:34]  SCr 2.07 [08-04 @ 07:04]              Urinalysis - [08-05-20 @ 09:38]      Color Light Yellow / Appearance Clear / SG 1.015 / pH 6.0      Gluc 200 mg/dL / Ketone Negative  / Bili Negative / Urobili <2 mg/dL       Blood Small / Protein 100 mg/dL / Leuk Est Negative / Nitrite Negative      RBC 10 / WBC 1 / Hyaline 1 / Gran  / Sq Epi  / Non Sq Epi 0 / Bacteria Negative      HbA1c 11.7      [11-07-19 @ 09:14]
Patient is a 65y old  Male who presents with a chief complaint of RLE cellulitis (03 Aug 2020 11:53)      Vascular Surgery Attending Progress Note    Interval HPI: pt states that he is willing to proceed w CO2 angiogram and he is aware that a small amount  of IV contrast will be used     Medications:  acetaminophen   Tablet .. 650 milliGRAM(s) Oral every 6 hours PRN  aspirin enteric coated 81 milliGRAM(s) Oral daily  cilostazol 50 milliGRAM(s) Oral two times a day  dabigatran 150 milliGRAM(s) Oral two times a day  dextrose 40% Gel 15 Gram(s) Oral once PRN  dextrose 5%. 1000 milliLiter(s) IV Continuous <Continuous>  dextrose 50% Injectable 12.5 Gram(s) IV Push once  dextrose 50% Injectable 25 Gram(s) IV Push once  dextrose 50% Injectable 25 Gram(s) IV Push once  diltiazem    milliGRAM(s) Oral daily  glucagon  Injectable 1 milliGRAM(s) IntraMuscular once PRN  insulin glargine Injectable (LANTUS) 25 Unit(s) SubCutaneous at bedtime  insulin lispro (HumaLOG) corrective regimen sliding scale   SubCutaneous three times a day before meals  insulin lispro (HumaLOG) corrective regimen sliding scale   SubCutaneous at bedtime  insulin lispro Injectable (HumaLOG) 12 Unit(s) SubCutaneous before breakfast  insulin lispro Injectable (HumaLOG) 14 Unit(s) SubCutaneous before lunch  insulin lispro Injectable (HumaLOG) 16 Unit(s) SubCutaneous before dinner  metoprolol succinate ER 50 milliGRAM(s) Oral two times a day  oxyCODONE    IR 5 milliGRAM(s) Oral every 6 hours PRN  piperacillin/tazobactam IVPB.. 3.375 Gram(s) IV Intermittent every 8 hours  polyethylene glycol 3350 17 Gram(s) Oral daily  pregabalin 100 milliGRAM(s) Oral three times a day  senna 2 Tablet(s) Oral at bedtime      Vital Signs Last 24 Hrs  T(C): 36.4 (03 Aug 2020 04:38), Max: 37.2 (02 Aug 2020 19:57)  T(F): 97.6 (03 Aug 2020 04:38), Max: 98.9 (02 Aug 2020 19:57)  HR: 99 (03 Aug 2020 04:38) (99 - 100)  BP: 161/71 (03 Aug 2020 04:38) (144/86 - 161/71)  BP(mean): --  RR: 18 (03 Aug 2020 04:38) (18 - 18)  SpO2: 94% (03 Aug 2020 04:38) (92% - 94%)  I&O's Summary    02 Aug 2020 07:01  -  03 Aug 2020 07:00  --------------------------------------------------------  IN: 2340 mL / OUT: 2350 mL / NET: -10 mL    03 Aug 2020 07:01  -  03 Aug 2020 14:30  --------------------------------------------------------  IN: 360 mL / OUT: 600 mL / NET: -240 mL        Physical Exam:  Neuro  A&Ox3 VSS  Vascular:  rt shin  dressing c/d/i  cellulitis improved     LABS:                        13.1   10.98 )-----------( 271      ( 03 Aug 2020 06:08 )             42.7     08-03    134<L>  |  92<L>  |  64<H>  ----------------------------<  217<H>  3.7   |  24  |  2.70<H>    Ca    9.2      03 Aug 2020 06:08          UMAIR MCGRAW MD  [phone] Cell [phone]
Patient is a 65y old  Male who presents with a chief complaint of RLE cellulitis (04 Aug 2020 18:15)      Vascular Surgery Attending Progress Note    Interval HPI: pt states that he wants to proceed w  rle angio and is willing to accept the  iv contrast risks     Medications:  acetaminophen   Tablet .. 650 milliGRAM(s) Oral every 6 hours PRN  aspirin enteric coated 81 milliGRAM(s) Oral daily  buMETAnide 2 milliGRAM(s) Oral daily  cilostazol 50 milliGRAM(s) Oral two times a day  dabigatran 150 milliGRAM(s) Oral two times a day  dextrose 40% Gel 15 Gram(s) Oral once PRN  dextrose 5%. 1000 milliLiter(s) IV Continuous <Continuous>  dextrose 50% Injectable 12.5 Gram(s) IV Push once  dextrose 50% Injectable 25 Gram(s) IV Push once  dextrose 50% Injectable 25 Gram(s) IV Push once  diltiazem    milliGRAM(s) Oral daily  glucagon  Injectable 1 milliGRAM(s) IntraMuscular once PRN  insulin glargine Injectable (LANTUS) 25 Unit(s) SubCutaneous at bedtime  insulin lispro (HumaLOG) corrective regimen sliding scale   SubCutaneous three times a day before meals  insulin lispro (HumaLOG) corrective regimen sliding scale   SubCutaneous at bedtime  insulin lispro Injectable (HumaLOG) 12 Unit(s) SubCutaneous before breakfast  insulin lispro Injectable (HumaLOG) 14 Unit(s) SubCutaneous before lunch  insulin lispro Injectable (HumaLOG) 16 Unit(s) SubCutaneous before dinner  metoprolol succinate ER 50 milliGRAM(s) Oral two times a day  oxyCODONE    IR 5 milliGRAM(s) Oral every 6 hours PRN  polyethylene glycol 3350 17 Gram(s) Oral daily  pregabalin 100 milliGRAM(s) Oral three times a day  senna 2 Tablet(s) Oral at bedtime      Vital Signs Last 24 Hrs  T(C): 36.8 (04 Aug 2020 19:54), Max: 36.8 (03 Aug 2020 21:02)  T(F): 98.2 (04 Aug 2020 19:54), Max: 98.3 (03 Aug 2020 21:02)  HR: 84 (04 Aug 2020 19:54) (70 - 90)  BP: 158/73 (04 Aug 2020 19:54) (134/77 - 169/85)  BP(mean): --  RR: 18 (04 Aug 2020 19:54) (18 - 18)  SpO2: 98% (04 Aug 2020 19:54) (93% - 98%)  I&O's Summary    03 Aug 2020 07:01  -  04 Aug 2020 07:00  --------------------------------------------------------  IN: 1060 mL / OUT: 1950 mL / NET: -890 mL    04 Aug 2020 07:01  -  04 Aug 2020 20:03  --------------------------------------------------------  IN: 720 mL / OUT: 1750 mL / NET: -1030 mL        Physical Exam:  Neuro  A&Ox3 VSS  Vascular:  rt shin celluiltis improving dressing c/d/i    LABS:                        13.8   9.86  )-----------( 284      ( 04 Aug 2020 07:04 )             45.9     08-04    136  |  94<L>  |  61<H>  ----------------------------<  280<H>  3.6   |  29  |  2.07<H>    Ca    9.8      04 Aug 2020 07:04          UMAIR MCGRAW MD  [phone] Cell [phone]
Patient is a 65y old  Male who presents with a chief complaint of RLE cellulitis (04 Aug 2020 20:02)      Vascular Surgery Attending Progress Note    Interval HPI: pt w/o c/o     Medications:  acetaminophen   Tablet .. 650 milliGRAM(s) Oral every 6 hours PRN  aspirin enteric coated 81 milliGRAM(s) Oral daily  buMETAnide 2 milliGRAM(s) Oral daily  cilostazol 50 milliGRAM(s) Oral two times a day  dabigatran 150 milliGRAM(s) Oral two times a day  dextrose 40% Gel 15 Gram(s) Oral once PRN  dextrose 5%. 1000 milliLiter(s) IV Continuous <Continuous>  dextrose 50% Injectable 12.5 Gram(s) IV Push once  dextrose 50% Injectable 25 Gram(s) IV Push once  dextrose 50% Injectable 25 Gram(s) IV Push once  diltiazem    milliGRAM(s) Oral daily  glucagon  Injectable 1 milliGRAM(s) IntraMuscular once PRN  insulin glargine Injectable (LANTUS) 25 Unit(s) SubCutaneous at bedtime  insulin lispro (HumaLOG) corrective regimen sliding scale   SubCutaneous three times a day before meals  insulin lispro (HumaLOG) corrective regimen sliding scale   SubCutaneous at bedtime  insulin lispro Injectable (HumaLOG) 12 Unit(s) SubCutaneous before breakfast  insulin lispro Injectable (HumaLOG) 14 Unit(s) SubCutaneous before lunch  insulin lispro Injectable (HumaLOG) 16 Unit(s) SubCutaneous before dinner  metoprolol succinate ER 50 milliGRAM(s) Oral two times a day  oxyCODONE    IR 5 milliGRAM(s) Oral every 6 hours PRN  polyethylene glycol 3350 17 Gram(s) Oral daily  pregabalin 100 milliGRAM(s) Oral three times a day  senna 2 Tablet(s) Oral at bedtime      Vital Signs Last 24 Hrs  T(C): 36.5 (05 Aug 2020 04:30), Max: 36.8 (04 Aug 2020 17:44)  T(F): 97.7 (05 Aug 2020 04:30), Max: 98.3 (04 Aug 2020 17:44)  HR: 98 (05 Aug 2020 04:30) (70 - 98)  BP: 152/67 (05 Aug 2020 04:30) (134/77 - 169/85)  BP(mean): --  RR: 18 (05 Aug 2020 04:30) (18 - 18)  SpO2: 95% (05 Aug 2020 04:30) (94% - 98%)  I&O's Summary    04 Aug 2020 07:01  -  05 Aug 2020 07:00  --------------------------------------------------------  IN: 840 mL / OUT: 2900 mL / NET: -2060 mL        Physical Exam:  Neuro  A&Ox3 VSS  Vascular:  rle dressing on shin c/d/i  cellulitis improving     LABS:                        13.0   12.22 )-----------( 298      ( 05 Aug 2020 07:34 )             43.1     08-05    135  |  92<L>  |  60<H>  ----------------------------<  254<H>  3.5   |  30  |  2.10<H>    Ca    9.5      05 Aug 2020 07:34  Phos  3.9     08-05  Mg     2.2     08-05          UMAIR MCGRAW MD  [phone] Cell [phone]
Patient is a 65y old  Male who presents with a chief complaint of RLE cellulitis (07 Aug 2020 13:32)      Vascular Surgery Attending Progress Note    Interval HPI: pt w/o c/o     Medications:  acetaminophen   Tablet .. 650 milliGRAM(s) Oral every 6 hours PRN  aspirin enteric coated 81 milliGRAM(s) Oral daily  dabigatran 150 milliGRAM(s) Oral two times a day  dextrose 40% Gel 15 Gram(s) Oral once PRN  dextrose 5%. 1000 milliLiter(s) IV Continuous <Continuous>  dextrose 50% Injectable 12.5 Gram(s) IV Push once  dextrose 50% Injectable 25 Gram(s) IV Push once  dextrose 50% Injectable 25 Gram(s) IV Push once  diltiazem    milliGRAM(s) Oral daily  glucagon  Injectable 1 milliGRAM(s) IntraMuscular once PRN  insulin glargine Injectable (LANTUS) 25 Unit(s) SubCutaneous at bedtime  insulin lispro (HumaLOG) corrective regimen sliding scale   SubCutaneous three times a day before meals  insulin lispro (HumaLOG) corrective regimen sliding scale   SubCutaneous at bedtime  insulin lispro Injectable (HumaLOG) 12 Unit(s) SubCutaneous before breakfast  insulin lispro Injectable (HumaLOG) 14 Unit(s) SubCutaneous before lunch  insulin lispro Injectable (HumaLOG) 16 Unit(s) SubCutaneous before dinner  lidocaine   Patch 1 Patch Transdermal daily PRN  lidocaine   Patch 1 Patch Transdermal daily PRN  metoprolol succinate ER 50 milliGRAM(s) Oral two times a day  oxyCODONE    IR 5 milliGRAM(s) Oral every 6 hours PRN  pentoxifylline 400 milliGRAM(s) Oral daily  polyethylene glycol 3350 17 Gram(s) Oral daily  pregabalin 100 milliGRAM(s) Oral three times a day  senna 2 Tablet(s) Oral at bedtime  sodium chloride 0.9%. 1000 milliLiter(s) IV Continuous <Continuous>      Vital Signs Last 24 Hrs  T(C): 36.9 (07 Aug 2020 15:46), Max: 36.9 (07 Aug 2020 15:46)  T(F): 98.4 (07 Aug 2020 15:46), Max: 98.4 (07 Aug 2020 15:46)  HR: 100 (07 Aug 2020 17:15) (73 - 100)  BP: 131/71 (07 Aug 2020 17:15) (116/66 - 154/80)  BP(mean): --  RR: 18 (07 Aug 2020 15:46) (18 - 18)  SpO2: 91% (07 Aug 2020 15:46) (91% - 96%)  I&O's Summary    06 Aug 2020 07:01  -  07 Aug 2020 07:00  --------------------------------------------------------  IN: 780 mL / OUT: 500 mL / NET: 280 mL    07 Aug 2020 07:01  -  07 Aug 2020 17:47  --------------------------------------------------------  IN: 840 mL / OUT: 0 mL / NET: 840 mL        Physical Exam:  Neuro  A&Ox3 VSS  Vascular:  left groin punct site c/d/i  rle wound c/d/i  cellulitis dec              LABS:                        12.7   11.77 )-----------( 350      ( 07 Aug 2020 05:58 )             42.1     08-07    134<L>  |  90<L>  |  65<H>  ----------------------------<  212<H>  4.0   |  28  |  2.57<H>    Ca    9.7      07 Aug 2020 05:58  Phos  5.5     08-07  Mg     2.2     08-07      PT/INR - ( 06 Aug 2020 10:30 )   PT: 17.5 sec;   INR: 1.50 ratio         PTT - ( 06 Aug 2020 10:30 )  PTT:52.9 sec    UMAIR MCGRAW MD  [phone] Cell [phone]
Patient is a 65y old  Male who presents with a chief complaint of RLE cellulitis (30 Jul 2020 17:07)      Vascular Surgery Attending Progress Note    Interval HPI: pt  c/o right ankle area discomfort  pt refuses to have the bandage removed for eval     Medications:  acetaminophen   Tablet .. 650 milliGRAM(s) Oral every 6 hours PRN  aspirin enteric coated 81 milliGRAM(s) Oral daily  buMETAnide 2 milliGRAM(s) Oral daily  dabigatran 150 milliGRAM(s) Oral two times a day  dextrose 40% Gel 15 Gram(s) Oral once PRN  dextrose 5%. 1000 milliLiter(s) IV Continuous <Continuous>  dextrose 50% Injectable 12.5 Gram(s) IV Push once  dextrose 50% Injectable 25 Gram(s) IV Push once  dextrose 50% Injectable 25 Gram(s) IV Push once  diltiazem    milliGRAM(s) Oral daily  glucagon  Injectable 1 milliGRAM(s) IntraMuscular once PRN  insulin glargine Injectable (LANTUS) 25 Unit(s) SubCutaneous at bedtime  insulin lispro (HumaLOG) corrective regimen sliding scale   SubCutaneous three times a day before meals  insulin lispro (HumaLOG) corrective regimen sliding scale   SubCutaneous at bedtime  insulin lispro Injectable (HumaLOG) 12 Unit(s) SubCutaneous before breakfast  insulin lispro Injectable (HumaLOG) 14 Unit(s) SubCutaneous before lunch  insulin lispro Injectable (HumaLOG) 16 Unit(s) SubCutaneous before dinner  metolazone 5 milliGRAM(s) Oral <User Schedule>  metoprolol succinate ER 50 milliGRAM(s) Oral two times a day  oxyCODONE    IR 5 milliGRAM(s) Oral every 6 hours PRN  piperacillin/tazobactam IVPB.. 3.375 Gram(s) IV Intermittent every 8 hours  polyethylene glycol 3350 17 Gram(s) Oral daily  pregabalin 100 milliGRAM(s) Oral three times a day  senna 2 Tablet(s) Oral at bedtime      Vital Signs Last 24 Hrs  T(C): 36.6 (30 Jul 2020 12:18), Max: 36.9 (29 Jul 2020 19:49)  T(F): 97.8 (30 Jul 2020 12:18), Max: 98.5 (29 Jul 2020 19:49)  HR: 75 (30 Jul 2020 12:18) (75 - 85)  BP: 128/77 (30 Jul 2020 12:18) (119/65 - 128/77)  BP(mean): --  RR: 18 (30 Jul 2020 12:18) (18 - 18)  SpO2: 99% (30 Jul 2020 12:18) (95% - 99%)  I&O's Summary    29 Jul 2020 07:01  -  30 Jul 2020 07:00  --------------------------------------------------------  IN: 1180 mL / OUT: 2050 mL / NET: -870 mL    30 Jul 2020 07:01  -  30 Jul 2020 17:36  --------------------------------------------------------  IN: 600 mL / OUT: 400 mL / NET: 200 mL        Physical Exam:  Vascular:  dressing c/d/i    LABS:                        13.8   11.35 )-----------( 249      ( 30 Jul 2020 06:56 )             46.9     07-30    135  |  93<L>  |  50<H>  ----------------------------<  160<H>  4.6   |  28  |  2.60<H>    Ca    9.4      30 Jul 2020 06:56    TPro  7.8  /  Alb  3.5  /  TBili  0.4  /  DBili  x   /  AST  13  /  ALT  <5<L>  /  AlkPhos  88  07-30      Rafat LE venous duplex reviewed  MARICARMEN/PVR reviewed      UMAIR MCGRAW MD  [phone] Cell [phone]
Patient is a 65y old  Male who presents with a chief complaint of RLE cellulitis (30 Jul 2020 20:39)      Vascular Surgery Attending Progress Note    Interval HPI: pt is agreeable to have rt leg wound dressing changed for me to eval     Medications:  acetaminophen   Tablet .. 650 milliGRAM(s) Oral every 6 hours PRN  aspirin enteric coated 81 milliGRAM(s) Oral daily  buMETAnide 2 milliGRAM(s) Oral daily  dabigatran 150 milliGRAM(s) Oral two times a day  dextrose 40% Gel 15 Gram(s) Oral once PRN  dextrose 5%. 1000 milliLiter(s) IV Continuous <Continuous>  dextrose 50% Injectable 12.5 Gram(s) IV Push once  dextrose 50% Injectable 25 Gram(s) IV Push once  dextrose 50% Injectable 25 Gram(s) IV Push once  diltiazem    milliGRAM(s) Oral daily  glucagon  Injectable 1 milliGRAM(s) IntraMuscular once PRN  insulin glargine Injectable (LANTUS) 25 Unit(s) SubCutaneous at bedtime  insulin lispro (HumaLOG) corrective regimen sliding scale   SubCutaneous three times a day before meals  insulin lispro (HumaLOG) corrective regimen sliding scale   SubCutaneous at bedtime  insulin lispro Injectable (HumaLOG) 12 Unit(s) SubCutaneous before breakfast  insulin lispro Injectable (HumaLOG) 14 Unit(s) SubCutaneous before lunch  insulin lispro Injectable (HumaLOG) 16 Unit(s) SubCutaneous before dinner  methylPREDNISolone sodium succinate Injectable 10 milliGRAM(s) IV Push once  metolazone 5 milliGRAM(s) Oral <User Schedule>  metoprolol succinate ER 50 milliGRAM(s) Oral two times a day  oxyCODONE    IR 5 milliGRAM(s) Oral every 6 hours PRN  piperacillin/tazobactam IVPB.. 3.375 Gram(s) IV Intermittent every 8 hours  polyethylene glycol 3350 17 Gram(s) Oral daily  pregabalin 100 milliGRAM(s) Oral three times a day  senna 2 Tablet(s) Oral at bedtime      Vital Signs Last 24 Hrs  T(C): 37 (31 Jul 2020 05:57), Max: 37.2 (30 Jul 2020 19:45)  T(F): 98.6 (31 Jul 2020 05:57), Max: 99 (30 Jul 2020 19:45)  HR: 96 (31 Jul 2020 05:57) (72 - 97)  BP: 136/74 (31 Jul 2020 05:57) (121/52 - 136/74)  BP(mean): --  RR: 18 (31 Jul 2020 05:57) (17 - 18)  SpO2: 95% (31 Jul 2020 05:57) (94% - 99%)  I&O's Summary    30 Jul 2020 07:01  -  31 Jul 2020 07:00  --------------------------------------------------------  IN: 1280 mL / OUT: 1350 mL / NET: -70 mL    31 Jul 2020 07:01  -  31 Jul 2020 11:08  --------------------------------------------------------  IN: 360 mL / OUT: 600 mL / NET: -240 mL        Physical Exam:  Neuro  A&Ox3 VSS  Vascular:  rt mid /distal shin wound 2cm diam w  limited granulation tissue  cellulitis remains     LABS:                        14.2   13.95 )-----------( 249      ( 31 Jul 2020 06:28 )             47.3     07-31    135  |  92<L>  |  49<H>  ----------------------------<  160<H>  4.6   |  26  |  2.79<H>    Ca    9.6      31 Jul 2020 06:28    TPro  7.8  /  Alb  3.5  /  TBili  0.4  /  DBili  x   /  AST  13  /  ALT  <5<L>  /  AlkPhos  88  07-30        UMAIR MCGRAW MD  [id number] [phone]
Patient is a 65y old  Male who presents with a chief complaint of RLE cellulitis (31 Jul 2020 11:44)    Being followed by ID for R leg ulcer,cellulitis     Interval history:still with leg pain   for dressing change   No acute events      ROS:  No cough,SOB,CP  No N/V/D./abd pain  No other complaints      Antimicrobials:    piperacillin/tazobactam IVPB.. 3.375 Gram(s) IV Intermittent every 8 hours    Other medications reviewed    Vital Signs Last 24 Hrs  T(C): 37 (07-31-20 @ 05:57), Max: 37.2 (07-30-20 @ 19:45)  T(F): 98.6 (07-31-20 @ 05:57), Max: 99 (07-30-20 @ 19:45)  HR: 96 (07-31-20 @ 05:57) (72 - 97)  BP: 136/74 (07-31-20 @ 05:57) (121/52 - 136/74)  BP(mean): --  RR: 18 (07-31-20 @ 05:57) (17 - 18)  SpO2: 95% (07-31-20 @ 05:57) (94% - 99%)    Physical Exam:        HEENT PERRLA EOMI    No oral exudate or erythema    Chest Good AE,CTA    CVS RRR S1 S2 WNl No murmur or rub or gallop    Abd soft BS normal No tenderness no masses    IV site no erythema tenderness or discharge    Ext RLE ulcer-dressing-some discharge  erythema on leg  Also stasis LLE    CNS AAO X 3 no focal    Lab Data:                          14.2   13.95 )-----------( 249      ( 31 Jul 2020 06:28 )             47.3     WBC Count: 13.95 (07-31-20 @ 06:28)  WBC Count: 11.35 (07-30-20 @ 06:56)  WBC Count: 9.87 (07-28-20 @ 20:23)    07-31    135  |  92<L>  |  49<H>  ----------------------------<  160<H>  4.6   |  26  |  2.79<H>    Creatinine, Serum: 2.79 mg/dL (07-31-20 @ 06:28)  Creatinine, Serum: 2.60 mg/dL (07-30-20 @ 06:56)  Creatinine, Serum: 2.73 mg/dL (07-28-20 @ 20:23)      Ca    9.6      31 Jul 2020 06:28    TPro  7.8  /  Alb  3.5  /  TBili  0.4  /  DBili  x   /  AST  13  /  ALT  <5<L>  /  AlkPhos  88  07-30        Culture - Blood (collected 29 Jul 2020 01:03)  Source: .Blood Blood  Preliminary Report (30 Jul 2020 02:01):    No growth to date.    Culture - Blood (collected 29 Jul 2020 01:03)  Source: .Blood Blood  Preliminary Report (30 Jul 2020 02:01):    No growth to date.          < from: [facility] Physiol Extremity Lower 3+ Level, BI (07.30.20 @ 10:45) >  IMPRESSION:    Definitive significant disease is notedat the right aorto iliac/iliofemoral level. Additional small vessel disease is noted at the right first toe. The right digital brachial index is 0.42.    Mild disease is noted at the left aorto iliac/iliofemoral level. Cannot exclude additional disease at the left popliteal tibial level. Definitive disease at the left trifurcation. The left ankle-brachial index is 0.66. The left digital brachial index is 0.34.          < end of copied text >
[facility]   [phone]  RENAL FOLLOW UP NOTE  --------------------------------------------------------------------------------  Chief Complaint:    24 hour events/subjective:    seen earlier. no c/o sob     PAST HISTORY  --------------------------------------------------------------------------------  No significant changes to PMH, PSH, FHx, SHx, unless otherwise noted    ALLERGIES & MEDICATIONS  --------------------------------------------------------------------------------  Allergies    No Known Allergies    Intolerances      Standing Inpatient Medications  aspirin enteric coated 81 milliGRAM(s) Oral daily  buMETAnide 2 milliGRAM(s) Oral daily  cilostazol 50 milliGRAM(s) Oral two times a day  dextrose 5%. 1000 milliLiter(s) IV Continuous <Continuous>  dextrose 50% Injectable 12.5 Gram(s) IV Push once  dextrose 50% Injectable 25 Gram(s) IV Push once  dextrose 50% Injectable 25 Gram(s) IV Push once  diltiazem    milliGRAM(s) Oral daily  insulin glargine Injectable (LANTUS) 25 Unit(s) SubCutaneous at bedtime  insulin lispro (HumaLOG) corrective regimen sliding scale   SubCutaneous every 6 hours  insulin lispro Injectable (HumaLOG) 12 Unit(s) SubCutaneous before breakfast  insulin lispro Injectable (HumaLOG) 14 Unit(s) SubCutaneous before lunch  insulin lispro Injectable (HumaLOG) 16 Unit(s) SubCutaneous before dinner  metoprolol succinate ER 50 milliGRAM(s) Oral two times a day  polyethylene glycol 3350 17 Gram(s) Oral daily  pregabalin 100 milliGRAM(s) Oral three times a day  senna 2 Tablet(s) Oral at bedtime  sodium chloride 0.9%. 1000 milliLiter(s) IV Continuous <Continuous>    PRN Inpatient Medications  acetaminophen   Tablet .. 650 milliGRAM(s) Oral every 6 hours PRN  dextrose 40% Gel 15 Gram(s) Oral once PRN  glucagon  Injectable 1 milliGRAM(s) IntraMuscular once PRN  lidocaine   Patch 1 Patch Transdermal daily PRN  lidocaine   Patch 1 Patch Transdermal daily PRN  oxyCODONE    IR 5 milliGRAM(s) Oral every 6 hours PRN      REVIEW OF SYSTEMS  --------------------------------------------------------------------------------    Gen: denies fevers/chills,  CVS: denies chest pain/palpitations  Resp: denies SOB/Cough  GI: Denies N/V/Abd pain  : Denies dysuria/oliguria/hematuria    All other systems were reviewed and are negative, except as noted.    VITALS/PHYSICAL EXAM  --------------------------------------------------------------------------------  T(C): 36 (08-06-20 @ 16:00), Max: 37.3 (08-06-20 @ 11:31)  HR: 89 (08-06-20 @ 16:00) (84 - 104)  BP: 157/71 (08-06-20 @ 16:00) (108/72 - 165/78)  RR: 15 (08-06-20 @ 16:00) (14 - 19)  SpO2: 100% (08-06-20 @ 16:00) (92% - 100%)  Wt(kg): --  Height (cm): 180.34 (08-06-20 @ 11:37)  Weight (kg): 130.6 (08-06-20 @ 11:37)  BMI (kg/m2): 40.2 (08-06-20 @ 11:37)  BSA (m2): 2.46 (08-06-20 @ 11:37)      08-05-20 @ 07:01  -  08-06-20 @ 07:00  --------------------------------------------------------  IN: 840 mL / OUT: 2500 mL / NET: -1660 mL    08-06-20 @ 07:01  -  08-06-20 @ 19:04  --------------------------------------------------------  IN: 540 mL / OUT: 0 mL / NET: 540 mL      Physical Exam:  	    Gen: Non toxic comfortable appearing  morbidly obese   	no jvd  	Pulm: decrease bs  no rales or ronchi or wheezing  	CV: RRR, S1S2; no rub  	Abd: +BS, soft, nontender/nondistended  	: No suprapubic tenderness  	UE: Warm, no cyanosis  no clubbing,  no edema  	LE: Warm,  RLE dressing over wound  1+  edema LE   	Neuro: alert and oriented. speech coherent     LABS/STUDIES  --------------------------------------------------------------------------------              13.2   12.94 >-----------<  312      [08-06-20 @ 07:40]              44.1     136  |  92  |  58  ----------------------------<  174      [08-06-20 @ 07:39]  3.4   |  27  |  1.88        Ca     9.6     [08-06-20 @ 07:39]      Mg     2.2     [08-06-20 @ 07:39]      Phos  4.4     [08-06-20 @ 07:39]      PT/INR: PT 17.5 , INR 1.50       [08-06-20 @ 10:30]  PTT: 52.9       [08-06-20 @ 10:30]      Creatinine Trend:  SCr 1.88 [08-06 @ 07:39]  SCr 2.10 [08-05 @ 07:34]  SCr 2.07 [08-04 @ 07:04]  SCr 2.70 [08-03 @ 06:08]  SCr 2.83 [08-02 @ 07:06]              Urinalysis - [08-05-20 @ 09:38]      Color Light Yellow / Appearance Clear / SG 1.015 / pH 6.0      Gluc 200 mg/dL / Ketone Negative  / Bili Negative / Urobili <2 mg/dL       Blood Small / Protein 100 mg/dL / Leuk Est Negative / Nitrite Negative      RBC 10 / WBC 1 / Hyaline 1 / Gran  / Sq Epi  / Non Sq Epi 0 / Bacteria Negative      HbA1c 11.7      [11-07-19 @ 09:14]
[facility]   [phone]  RENAL FOLLOW UP NOTE  --------------------------------------------------------------------------------  Chief Complaint:    24 hour events/subjective:    c/o pain in right leg.       PAST HISTORY  --------------------------------------------------------------------------------  No significant changes to PMH, PSH, FHx, SHx, unless otherwise noted    ALLERGIES & MEDICATIONS  --------------------------------------------------------------------------------  Allergies    No Known Allergies    Intolerances      Standing Inpatient Medications  aspirin enteric coated 81 milliGRAM(s) Oral daily  buMETAnide 2 milliGRAM(s) Oral daily  dabigatran 150 milliGRAM(s) Oral two times a day  dextrose 5%. 1000 milliLiter(s) IV Continuous <Continuous>  dextrose 50% Injectable 12.5 Gram(s) IV Push once  dextrose 50% Injectable 25 Gram(s) IV Push once  dextrose 50% Injectable 25 Gram(s) IV Push once  diltiazem    milliGRAM(s) Oral daily  insulin glargine Injectable (LANTUS) 25 Unit(s) SubCutaneous at bedtime  insulin lispro (HumaLOG) corrective regimen sliding scale   SubCutaneous three times a day before meals  insulin lispro (HumaLOG) corrective regimen sliding scale   SubCutaneous at bedtime  insulin lispro Injectable (HumaLOG) 12 Unit(s) SubCutaneous before breakfast  insulin lispro Injectable (HumaLOG) 14 Unit(s) SubCutaneous before lunch  insulin lispro Injectable (HumaLOG) 16 Unit(s) SubCutaneous before dinner  methylPREDNISolone sodium succinate Injectable 10 milliGRAM(s) IV Push once  metolazone 5 milliGRAM(s) Oral <User Schedule>  metoprolol succinate ER 50 milliGRAM(s) Oral two times a day  piperacillin/tazobactam IVPB.. 3.375 Gram(s) IV Intermittent every 8 hours  polyethylene glycol 3350 17 Gram(s) Oral daily  pregabalin 100 milliGRAM(s) Oral three times a day  senna 2 Tablet(s) Oral at bedtime    PRN Inpatient Medications  acetaminophen   Tablet .. 650 milliGRAM(s) Oral every 6 hours PRN  dextrose 40% Gel 15 Gram(s) Oral once PRN  glucagon  Injectable 1 milliGRAM(s) IntraMuscular once PRN  oxyCODONE    IR 5 milliGRAM(s) Oral every 6 hours PRN      REVIEW OF SYSTEMS  --------------------------------------------------------------------------------    Gen: denies  fevers/chills,  CVS: denies chest pain/palpitations  Resp: denies SOB/Cough  GI: Denies N/V/Abd pain  : Denies dysuria/oliguria/hematuria    All other systems were reviewed and are negative, except as noted.    VITALS/PHYSICAL EXAM  --------------------------------------------------------------------------------  T(C): 37 (07-31-20 @ 05:57), Max: 37.2 (07-30-20 @ 19:45)  HR: 96 (07-31-20 @ 05:57) (72 - 97)  BP: 136/74 (07-31-20 @ 05:57) (121/52 - 136/74)  RR: 18 (07-31-20 @ 05:57) (17 - 18)  SpO2: 95% (07-31-20 @ 05:57) (94% - 99%)  Wt(kg): --        07-30-20 @ 07:01  -  07-31-20 @ 07:00  --------------------------------------------------------  IN: 1280 mL / OUT: 1350 mL / NET: -70 mL    07-31-20 @ 07:01  -  07-31-20 @ 12:24  --------------------------------------------------------  IN: 360 mL / OUT: 600 mL / NET: -240 mL      Physical Exam:  	  Gen: Non toxic comfortable appearing  morbidly obese   	no jvd  	Pulm: decrease bs  no rales or ronchi or wheezing  	CV: RRR, S1S2; no rub  	Abd: +BS, soft, nontender/nondistended  	: No suprapubic tenderness  	UE: Warm, no cyanosis  no clubbing,  no edema  	LE: Warm,  RLE dressing over wound no edema LE   	Neuro: alert and oriented. speech coherent       LABS/STUDIES  --------------------------------------------------------------------------------              14.2   13.95 >-----------<  249      [07-31-20 @ 06:28]              47.3     135  |  92  |  49  ----------------------------<  160      [07-31-20 @ 06:28]  4.6   |  26  |  2.79        Ca     9.6     [07-31-20 @ 06:28]    TPro  7.8  /  Alb  3.5  /  TBili  0.4  /  DBili  x   /  AST  13  /  ALT  <5  /  AlkPhos  88  [07-30-20 @ 06:56]          Creatinine Trend:  SCr 2.79 [07-31 @ 06:28]  SCr 2.60 [07-30 @ 06:56]  SCr 2.73 [07-28 @ 20:23]                Urine Creatinine 82      [07-29-20 @ 05:37]  Urine Sodium 93      [07-29-20 @ 05:37]  Urine Osmolality 445      [07-29-20 @ 10:44]    HbA1c 11.7      [11-07-19 @ 09:14]
infectious diseases progress note:    Patient is a 65y old  Male who presents with a chief complaint of RLE cellulitis (02 Aug 2020 20:09)        Cellulitis             Allergies    No Known Allergies    Intolerances        ANTIBIOTICS/RELEVANT:  antimicrobials  piperacillin/tazobactam IVPB.. 3.375 Gram(s) IV Intermittent every 8 hours    immunologic:    OTHER:  acetaminophen   Tablet .. 650 milliGRAM(s) Oral every 6 hours PRN  aspirin enteric coated 81 milliGRAM(s) Oral daily  cilostazol 50 milliGRAM(s) Oral two times a day  dabigatran 150 milliGRAM(s) Oral two times a day  dextrose 40% Gel 15 Gram(s) Oral once PRN  dextrose 5%. 1000 milliLiter(s) IV Continuous <Continuous>  dextrose 50% Injectable 12.5 Gram(s) IV Push once  dextrose 50% Injectable 25 Gram(s) IV Push once  dextrose 50% Injectable 25 Gram(s) IV Push once  diltiazem    milliGRAM(s) Oral daily  glucagon  Injectable 1 milliGRAM(s) IntraMuscular once PRN  insulin glargine Injectable (LANTUS) 25 Unit(s) SubCutaneous at bedtime  insulin lispro (HumaLOG) corrective regimen sliding scale   SubCutaneous three times a day before meals  insulin lispro (HumaLOG) corrective regimen sliding scale   SubCutaneous at bedtime  insulin lispro Injectable (HumaLOG) 12 Unit(s) SubCutaneous before breakfast  insulin lispro Injectable (HumaLOG) 14 Unit(s) SubCutaneous before lunch  insulin lispro Injectable (HumaLOG) 16 Unit(s) SubCutaneous before dinner  metoprolol succinate ER 50 milliGRAM(s) Oral two times a day  oxyCODONE    IR 5 milliGRAM(s) Oral every 6 hours PRN  polyethylene glycol 3350 17 Gram(s) Oral daily  pregabalin 100 milliGRAM(s) Oral three times a day  senna 2 Tablet(s) Oral at bedtime      Objective:  Vital Signs Last 24 Hrs  T(C): 36.4 (03 Aug 2020 04:38), Max: 37.2 (02 Aug 2020 19:57)  T(F): 97.6 (03 Aug 2020 04:38), Max: 98.9 (02 Aug 2020 19:57)  HR: 99 (03 Aug 2020 04:38) (94 - 100)  BP: 161/71 (03 Aug 2020 04:38) (144/86 - 161/71)  BP(mean): --  RR: 18 (03 Aug 2020 04:38) (18 - 18)  SpO2: 94% (03 Aug 2020 04:38) (92% - 98%)       Eyes:VINNIE, EOMI  Ear/Nose/Throat: no oral lesion, no sinus tenderness on percussion	  Neck:no JVD, no lymphadenopathy, supple  Respiratory: CTA kiara  Cardiovascular: S1S2 RRR, no murmurs  Gastrointestinal:soft, (+) BS, no HSM  Extremities:no signs of cellulitis         LABS:                        13.1   10.98 )-----------( 271      ( 03 Aug 2020 06:08 )             42.7     08-03    134<L>  |  92<L>  |  64<H>  ----------------------------<  217<H>  3.7   |  24  |  2.70<H>    Ca    9.2      03 Aug 2020 06:08              MICROBIOLOGY:    RECENT CULTURES:  07-29 @ 01:03 .Blood Blood                No Growth Final          RESPIRATORY CULTURES:              RADIOLOGY & ADDITIONAL STUDIES:        Pager [phone]  After 5 pm/weekends or if no response :[phone]
infectious diseases progress note:    Patient is a 65y old  Male who presents with a chief complaint of RLE cellulitis (03 Aug 2020 21:25)        Cellulitis        ROS:  CONSTITUTIONAL:  Negative fever or chills, feels well, good appetite  EYES:  Negative  blurry vision or double vision  CARDIOVASCULAR:  Negative for chest pain or palpitations  RESPIRATORY:  Negative for cough, wheezing, or SOB   GASTROINTESTINAL:  Negative for nausea, vomiting, diarrhea, constipation, or abdominal pain  GENITOURINARY:  Negative frequency, urgency or dysuria  NEUROLOGIC:  No headache, confusion, dizziness, lightheadedness    Allergies    No Known Allergies    Intolerances        ANTIBIOTICS/RELEVANT:  antimicrobials  piperacillin/tazobactam IVPB.. 3.375 Gram(s) IV Intermittent every 8 hours    immunologic:    OTHER:  acetaminophen   Tablet .. 650 milliGRAM(s) Oral every 6 hours PRN  aspirin enteric coated 81 milliGRAM(s) Oral daily  buMETAnide 2 milliGRAM(s) Oral daily  cilostazol 50 milliGRAM(s) Oral two times a day  dabigatran 150 milliGRAM(s) Oral two times a day  dextrose 40% Gel 15 Gram(s) Oral once PRN  dextrose 5%. 1000 milliLiter(s) IV Continuous <Continuous>  dextrose 50% Injectable 12.5 Gram(s) IV Push once  dextrose 50% Injectable 25 Gram(s) IV Push once  dextrose 50% Injectable 25 Gram(s) IV Push once  diltiazem    milliGRAM(s) Oral daily  glucagon  Injectable 1 milliGRAM(s) IntraMuscular once PRN  insulin glargine Injectable (LANTUS) 25 Unit(s) SubCutaneous at bedtime  insulin lispro (HumaLOG) corrective regimen sliding scale   SubCutaneous three times a day before meals  insulin lispro (HumaLOG) corrective regimen sliding scale   SubCutaneous at bedtime  insulin lispro Injectable (HumaLOG) 12 Unit(s) SubCutaneous before breakfast  insulin lispro Injectable (HumaLOG) 14 Unit(s) SubCutaneous before lunch  insulin lispro Injectable (HumaLOG) 16 Unit(s) SubCutaneous before dinner  metoprolol succinate ER 50 milliGRAM(s) Oral two times a day  oxyCODONE    IR 5 milliGRAM(s) Oral every 6 hours PRN  polyethylene glycol 3350 17 Gram(s) Oral daily  pregabalin 100 milliGRAM(s) Oral three times a day  senna 2 Tablet(s) Oral at bedtime      Objective:  Vital Signs Last 24 Hrs  T(C): 36.4 (04 Aug 2020 04:35), Max: 36.8 (03 Aug 2020 21:02)  T(F): 97.5 (04 Aug 2020 04:35), Max: 98.3 (03 Aug 2020 21:02)  HR: 90 (04 Aug 2020 04:35) (90 - 98)  BP: 147/79 (04 Aug 2020 04:35) (139/71 - 171/71)  BP(mean): --  RR: 18 (04 Aug 2020 04:35) (18 - 18)  SpO2: 94% (04 Aug 2020 04:35) (93% - 94%)     Eyes:VINNIE, EOMI  Ear/Nose/Throat: no oral lesion, no sinus tenderness on percussion	  Neck:no JVD, no lymphadenopathy, supple  Respiratory: CTA kiara  Cardiovascular: S1S2 RRR, no murmurs  Gastrointestinal:soft, (+) BS, no HSM  Extremities:no cellulitis  open ulcer        LABS:                        13.8   9.86  )-----------( 284      ( 04 Aug 2020 07:04 )             45.9     08-04    136  |  94<L>  |  61<H>  ----------------------------<  280<H>  3.6   |  29  |  2.07<H>    Ca    9.8      04 Aug 2020 07:04              MICROBIOLOGY:    RECENT CULTURES:  07-29 @ 01:03 .Blood Blood                No Growth Final          RESPIRATORY CULTURES:              RADIOLOGY & ADDITIONAL STUDIES:        Pager [phone]  After 5 pm/weekends or if no response :[phone]
infectious diseases progress note:    Patient is a 65y old  Male who presents with a chief complaint of RLE cellulitis (30 Jul 2020 10:25)        Cellulitis           Allergies    No Known Allergies    Intolerances        ANTIBIOTICS/RELEVANT:  antimicrobials  piperacillin/tazobactam IVPB.. 3.375 Gram(s) IV Intermittent every 8 hours    immunologic:    OTHER:  acetaminophen   Tablet .. 650 milliGRAM(s) Oral every 6 hours PRN  aspirin enteric coated 81 milliGRAM(s) Oral daily  buMETAnide 2 milliGRAM(s) Oral daily  dabigatran 150 milliGRAM(s) Oral two times a day  dextrose 40% Gel 15 Gram(s) Oral once PRN  dextrose 5%. 1000 milliLiter(s) IV Continuous <Continuous>  dextrose 50% Injectable 12.5 Gram(s) IV Push once  dextrose 50% Injectable 25 Gram(s) IV Push once  dextrose 50% Injectable 25 Gram(s) IV Push once  diltiazem    milliGRAM(s) Oral daily  glucagon  Injectable 1 milliGRAM(s) IntraMuscular once PRN  insulin glargine Injectable (LANTUS) 25 Unit(s) SubCutaneous at bedtime  insulin lispro (HumaLOG) corrective regimen sliding scale   SubCutaneous three times a day before meals  insulin lispro (HumaLOG) corrective regimen sliding scale   SubCutaneous at bedtime  insulin lispro Injectable (HumaLOG) 12 Unit(s) SubCutaneous before breakfast  insulin lispro Injectable (HumaLOG) 14 Unit(s) SubCutaneous before lunch  insulin lispro Injectable (HumaLOG) 16 Unit(s) SubCutaneous before dinner  metolazone 5 milliGRAM(s) Oral <User Schedule>  metoprolol succinate ER 50 milliGRAM(s) Oral two times a day  oxyCODONE    IR 5 milliGRAM(s) Oral every 6 hours PRN  polyethylene glycol 3350 17 Gram(s) Oral daily  pregabalin 100 milliGRAM(s) Oral three times a day  senna 2 Tablet(s) Oral at bedtime      Objective:  Vital Signs Last 24 Hrs  T(C): 36.7 (30 Jul 2020 05:07), Max: 36.9 (29 Jul 2020 19:49)  T(F): 98 (30 Jul 2020 05:07), Max: 98.5 (29 Jul 2020 19:49)  HR: 85 (30 Jul 2020 05:07) (82 - 85)  BP: 119/65 (30 Jul 2020 05:07) (119/65 - 124/69)  BP(mean): --  RR: 18 (30 Jul 2020 05:07) (18 - 18)  SpO2: 95% (30 Jul 2020 05:07) (93% - 98%)    PHYSICAL EXAM:     Eyes:VINNIE, EOMI  Ear/Nose/Throat: no oral lesion, no sinus tenderness on percussion	  Neck:no JVD, no lymphadenopathy, supple  Respiratory: CTA kiara  Cardiovascular: S1S2 RRR, no murmurs  Gastrointestinal:soft, (+) BS, no HSM  Extremities: large open blister over pretibial area         LABS:                        13.8   11.35 )-----------( 249      ( 30 Jul 2020 06:56 )             46.9     07-30    135  |  93<L>  |  50<H>  ----------------------------<  160<H>  4.6   |  28  |  2.60<H>    Ca    9.4      30 Jul 2020 06:56    TPro  7.8  /  Alb  3.5  /  TBili  0.4  /  DBili  x   /  AST  13  /  ALT  <5<L>  /  AlkPhos  88  07-30            MICROBIOLOGY:    RECENT CULTURES:  07-29 @ 01:03 .Blood Blood                No growth to date.          RESPIRATORY CULTURES:              RADIOLOGY & ADDITIONAL STUDIES:        Pager [phone]  After 5 pm/weekends or if no response :[phone]

## 2020-08-08 NOTE — PROGRESS NOTE ADULT - ATTENDING COMMENTS
agree with the above assessment and plan by the NP  CV status remains stable s/p LE angio  cont current cardiac mgmt  abx per primary team  resume bumex  creat improving

## 2020-08-08 NOTE — PROGRESS NOTE ADULT - PROVIDER SPECIALTY LIST ADULT
Cardiology
Infectious Disease
Internal Medicine
Nephrology
Vascular Surgery
Infectious Disease
Internal Medicine
Cardiology

## 2020-08-18 ENCOUNTER — APPOINTMENT (OUTPATIENT)
Dept: WOUND CARE | Facility: CLINIC | Age: 65
End: 2020-08-18

## 2021-01-01 ENCOUNTER — TRANSCRIPTION ENCOUNTER (OUTPATIENT)
Age: 66
End: 2021-01-01

## 2021-01-01 ENCOUNTER — INPATIENT (INPATIENT)
Facility: HOSPITAL | Age: 66
LOS: 5 days | Discharge: HOME | DRG: 68 | End: 2021-10-27
Attending: INTERNAL MEDICINE | Admitting: INTERNAL MEDICINE
Payer: COMMERCIAL

## 2021-01-01 ENCOUNTER — NON-APPOINTMENT (OUTPATIENT)
Age: 66
End: 2021-01-01

## 2021-01-01 VITALS
OXYGEN SATURATION: 96 % | HEART RATE: 76 BPM | RESPIRATION RATE: 18 BRPM | WEIGHT: 298.06 LBS | DIASTOLIC BLOOD PRESSURE: 84 MMHG | HEIGHT: 71 IN | TEMPERATURE: 98 F | SYSTOLIC BLOOD PRESSURE: 147 MMHG

## 2021-01-01 VITALS — SYSTOLIC BLOOD PRESSURE: 149 MMHG | HEART RATE: 74 BPM | DIASTOLIC BLOOD PRESSURE: 79 MMHG

## 2021-01-01 DIAGNOSIS — I48.0 PAROXYSMAL ATRIAL FIBRILLATION: ICD-10-CM

## 2021-01-01 DIAGNOSIS — E11.42 TYPE 2 DIABETES MELLITUS WITH DIABETIC POLYNEUROPATHY: ICD-10-CM

## 2021-01-01 DIAGNOSIS — N18.30 CHRONIC KIDNEY DISEASE, STAGE 3 UNSPECIFIED: ICD-10-CM

## 2021-01-01 DIAGNOSIS — R42 DIZZINESS AND GIDDINESS: ICD-10-CM

## 2021-01-01 DIAGNOSIS — Z29.9 ENCOUNTER FOR PROPHYLACTIC MEASURES, UNSPECIFIED: ICD-10-CM

## 2021-01-01 DIAGNOSIS — I25.10 ATHEROSCLEROTIC HEART DISEASE OF NATIVE CORONARY ARTERY WITHOUT ANGINA PECTORIS: ICD-10-CM

## 2021-01-01 DIAGNOSIS — Z98.89 OTHER SPECIFIED POSTPROCEDURAL STATES: Chronic | ICD-10-CM

## 2021-01-01 DIAGNOSIS — I65.23 OCCLUSION AND STENOSIS OF BILATERAL CAROTID ARTERIES: ICD-10-CM

## 2021-01-01 DIAGNOSIS — Z95.5 PRESENCE OF CORONARY ANGIOPLASTY IMPLANT AND GRAFT: Chronic | ICD-10-CM

## 2021-01-01 DIAGNOSIS — I25.5 ISCHEMIC CARDIOMYOPATHY: ICD-10-CM

## 2021-01-01 DIAGNOSIS — S81.801A UNSPECIFIED OPEN WOUND, RIGHT LOWER LEG, INITIAL ENCOUNTER: ICD-10-CM

## 2021-01-01 DIAGNOSIS — M10.9 GOUT, UNSPECIFIED: ICD-10-CM

## 2021-01-01 LAB
A1C WITH ESTIMATED AVERAGE GLUCOSE RESULT: 9.3 % — HIGH (ref 4–5.6)
ALBUMIN SERPL ELPH-MCNC: 4.1 G/DL — SIGNIFICANT CHANGE UP (ref 3.3–5)
ALP SERPL-CCNC: 104 U/L — SIGNIFICANT CHANGE UP (ref 40–120)
ALT FLD-CCNC: 8 U/L — LOW (ref 10–45)
ANION GAP SERPL CALC-SCNC: 15 MMOL/L — SIGNIFICANT CHANGE UP (ref 5–17)
ANION GAP SERPL CALC-SCNC: 16 MMOL/L — SIGNIFICANT CHANGE UP (ref 5–17)
ANION GAP SERPL CALC-SCNC: 18 MMOL/L — HIGH (ref 5–17)
ANION GAP SERPL CALC-SCNC: 18 MMOL/L — HIGH (ref 5–17)
APPEARANCE UR: CLEAR — SIGNIFICANT CHANGE UP
APTT BLD: 122.2 SEC — CRITICAL HIGH (ref 27.5–35.5)
APTT BLD: 36.1 SEC — HIGH (ref 27.5–35.5)
APTT BLD: 42.9 SEC — HIGH (ref 27.5–35.5)
APTT BLD: 44.7 SEC — HIGH (ref 27.5–35.5)
APTT BLD: 49.1 SEC — HIGH (ref 27.5–35.5)
APTT BLD: 60.7 SEC — HIGH (ref 27.5–35.5)
APTT BLD: 70.9 SEC — HIGH (ref 27.5–35.5)
APTT BLD: 73.9 SEC — HIGH (ref 27.5–35.5)
APTT BLD: 75.2 SEC — HIGH (ref 27.5–35.5)
APTT BLD: 76.5 SEC — HIGH (ref 27.5–35.5)
APTT BLD: 78.9 SEC — HIGH (ref 27.5–35.5)
APTT BLD: 83.2 SEC — HIGH (ref 27.5–35.5)
APTT BLD: >200 SEC — CRITICAL HIGH (ref 27.5–35.5)
APTT BLD: >200 SEC — CRITICAL HIGH (ref 27.5–35.5)
AST SERPL-CCNC: 16 U/L — SIGNIFICANT CHANGE UP (ref 10–40)
BASOPHILS # BLD AUTO: 0.04 K/UL — SIGNIFICANT CHANGE UP (ref 0–0.2)
BASOPHILS # BLD AUTO: 0.04 K/UL — SIGNIFICANT CHANGE UP (ref 0–0.2)
BASOPHILS NFR BLD AUTO: 0.4 % — SIGNIFICANT CHANGE UP (ref 0–2)
BASOPHILS NFR BLD AUTO: 0.5 % — SIGNIFICANT CHANGE UP (ref 0–2)
BILIRUB SERPL-MCNC: 0.4 MG/DL — SIGNIFICANT CHANGE UP (ref 0.2–1.2)
BILIRUB UR-MCNC: NEGATIVE — SIGNIFICANT CHANGE UP
BUN SERPL-MCNC: 49 MG/DL — HIGH (ref 7–23)
BUN SERPL-MCNC: 51 MG/DL — HIGH (ref 7–23)
BUN SERPL-MCNC: 52 MG/DL — HIGH (ref 7–23)
BUN SERPL-MCNC: 63 MG/DL — HIGH (ref 7–23)
BUN SERPL-MCNC: 64 MG/DL — HIGH (ref 7–23)
BUN SERPL-MCNC: 70 MG/DL — HIGH (ref 7–23)
CALCIUM SERPL-MCNC: 9 MG/DL — SIGNIFICANT CHANGE UP (ref 8.4–10.5)
CALCIUM SERPL-MCNC: 9.2 MG/DL — SIGNIFICANT CHANGE UP (ref 8.4–10.5)
CALCIUM SERPL-MCNC: 9.2 MG/DL — SIGNIFICANT CHANGE UP (ref 8.4–10.5)
CALCIUM SERPL-MCNC: 9.3 MG/DL — SIGNIFICANT CHANGE UP (ref 8.4–10.5)
CALCIUM SERPL-MCNC: 9.4 MG/DL — SIGNIFICANT CHANGE UP (ref 8.4–10.5)
CALCIUM SERPL-MCNC: 9.6 MG/DL — SIGNIFICANT CHANGE UP (ref 8.4–10.5)
CHLORIDE SERPL-SCNC: 92 MMOL/L — LOW (ref 96–108)
CHLORIDE SERPL-SCNC: 94 MMOL/L — LOW (ref 96–108)
CHLORIDE SERPL-SCNC: 96 MMOL/L — SIGNIFICANT CHANGE UP (ref 96–108)
CHLORIDE SERPL-SCNC: 97 MMOL/L — SIGNIFICANT CHANGE UP (ref 96–108)
CO2 SERPL-SCNC: 23 MMOL/L — SIGNIFICANT CHANGE UP (ref 22–31)
CO2 SERPL-SCNC: 24 MMOL/L — SIGNIFICANT CHANGE UP (ref 22–31)
CO2 SERPL-SCNC: 24 MMOL/L — SIGNIFICANT CHANGE UP (ref 22–31)
CO2 SERPL-SCNC: 25 MMOL/L — SIGNIFICANT CHANGE UP (ref 22–31)
CO2 SERPL-SCNC: 25 MMOL/L — SIGNIFICANT CHANGE UP (ref 22–31)
CO2 SERPL-SCNC: 28 MMOL/L — SIGNIFICANT CHANGE UP (ref 22–31)
COLOR SPEC: SIGNIFICANT CHANGE UP
CREAT ?TM UR-MCNC: 56 MG/DL — SIGNIFICANT CHANGE UP
CREAT SERPL-MCNC: 1.78 MG/DL — HIGH (ref 0.5–1.3)
CREAT SERPL-MCNC: 1.88 MG/DL — HIGH (ref 0.5–1.3)
CREAT SERPL-MCNC: 1.97 MG/DL — HIGH (ref 0.5–1.3)
CREAT SERPL-MCNC: 2.22 MG/DL — HIGH (ref 0.5–1.3)
CREAT SERPL-MCNC: 2.22 MG/DL — HIGH (ref 0.5–1.3)
CREAT SERPL-MCNC: 2.31 MG/DL — HIGH (ref 0.5–1.3)
DIFF PNL FLD: ABNORMAL
EOSINOPHIL # BLD AUTO: 0.1 K/UL — SIGNIFICANT CHANGE UP (ref 0–0.5)
EOSINOPHIL # BLD AUTO: 0.13 K/UL — SIGNIFICANT CHANGE UP (ref 0–0.5)
EOSINOPHIL NFR BLD AUTO: 1.1 % — SIGNIFICANT CHANGE UP (ref 0–6)
EOSINOPHIL NFR BLD AUTO: 1.4 % — SIGNIFICANT CHANGE UP (ref 0–6)
ESTIMATED AVERAGE GLUCOSE: 220 MG/DL — HIGH (ref 68–114)
GLUCOSE BLDC GLUCOMTR-MCNC: 101 MG/DL — HIGH (ref 70–99)
GLUCOSE BLDC GLUCOMTR-MCNC: 165 MG/DL — HIGH (ref 70–99)
GLUCOSE BLDC GLUCOMTR-MCNC: 176 MG/DL — HIGH (ref 70–99)
GLUCOSE BLDC GLUCOMTR-MCNC: 180 MG/DL — HIGH (ref 70–99)
GLUCOSE BLDC GLUCOMTR-MCNC: 181 MG/DL — HIGH (ref 70–99)
GLUCOSE BLDC GLUCOMTR-MCNC: 182 MG/DL — HIGH (ref 70–99)
GLUCOSE BLDC GLUCOMTR-MCNC: 182 MG/DL — HIGH (ref 70–99)
GLUCOSE BLDC GLUCOMTR-MCNC: 183 MG/DL — HIGH (ref 70–99)
GLUCOSE BLDC GLUCOMTR-MCNC: 194 MG/DL — HIGH (ref 70–99)
GLUCOSE BLDC GLUCOMTR-MCNC: 195 MG/DL — HIGH (ref 70–99)
GLUCOSE BLDC GLUCOMTR-MCNC: 197 MG/DL — HIGH (ref 70–99)
GLUCOSE BLDC GLUCOMTR-MCNC: 201 MG/DL — HIGH (ref 70–99)
GLUCOSE BLDC GLUCOMTR-MCNC: 206 MG/DL — HIGH (ref 70–99)
GLUCOSE BLDC GLUCOMTR-MCNC: 208 MG/DL — HIGH (ref 70–99)
GLUCOSE BLDC GLUCOMTR-MCNC: 210 MG/DL — HIGH (ref 70–99)
GLUCOSE BLDC GLUCOMTR-MCNC: 210 MG/DL — HIGH (ref 70–99)
GLUCOSE BLDC GLUCOMTR-MCNC: 212 MG/DL — HIGH (ref 70–99)
GLUCOSE BLDC GLUCOMTR-MCNC: 213 MG/DL — HIGH (ref 70–99)
GLUCOSE BLDC GLUCOMTR-MCNC: 257 MG/DL — HIGH (ref 70–99)
GLUCOSE BLDC GLUCOMTR-MCNC: 266 MG/DL — HIGH (ref 70–99)
GLUCOSE BLDC GLUCOMTR-MCNC: 279 MG/DL — HIGH (ref 70–99)
GLUCOSE BLDC GLUCOMTR-MCNC: 300 MG/DL — HIGH (ref 70–99)
GLUCOSE BLDC GLUCOMTR-MCNC: 312 MG/DL — HIGH (ref 70–99)
GLUCOSE BLDC GLUCOMTR-MCNC: 354 MG/DL — HIGH (ref 70–99)
GLUCOSE BLDC GLUCOMTR-MCNC: 361 MG/DL — HIGH (ref 70–99)
GLUCOSE BLDC GLUCOMTR-MCNC: 429 MG/DL — HIGH (ref 70–99)
GLUCOSE SERPL-MCNC: 165 MG/DL — HIGH (ref 70–99)
GLUCOSE SERPL-MCNC: 172 MG/DL — HIGH (ref 70–99)
GLUCOSE SERPL-MCNC: 178 MG/DL — HIGH (ref 70–99)
GLUCOSE SERPL-MCNC: 204 MG/DL — HIGH (ref 70–99)
GLUCOSE SERPL-MCNC: 221 MG/DL — HIGH (ref 70–99)
GLUCOSE SERPL-MCNC: 307 MG/DL — HIGH (ref 70–99)
GLUCOSE UR QL: ABNORMAL
HCT VFR BLD CALC: 47 % — SIGNIFICANT CHANGE UP (ref 39–50)
HCT VFR BLD CALC: 47.9 % — SIGNIFICANT CHANGE UP (ref 39–50)
HCT VFR BLD CALC: 48.6 % — SIGNIFICANT CHANGE UP (ref 39–50)
HCT VFR BLD CALC: 48.9 % — SIGNIFICANT CHANGE UP (ref 39–50)
HCT VFR BLD CALC: 49.4 % — SIGNIFICANT CHANGE UP (ref 39–50)
HCT VFR BLD CALC: 49.7 % — SIGNIFICANT CHANGE UP (ref 39–50)
HCT VFR BLD CALC: 50.9 % — HIGH (ref 39–50)
HCT VFR BLD CALC: 52.9 % — HIGH (ref 39–50)
HGB BLD-MCNC: 14.5 G/DL — SIGNIFICANT CHANGE UP (ref 13–17)
HGB BLD-MCNC: 14.7 G/DL — SIGNIFICANT CHANGE UP (ref 13–17)
HGB BLD-MCNC: 14.8 G/DL — SIGNIFICANT CHANGE UP (ref 13–17)
HGB BLD-MCNC: 14.9 G/DL — SIGNIFICANT CHANGE UP (ref 13–17)
HGB BLD-MCNC: 15 G/DL — SIGNIFICANT CHANGE UP (ref 13–17)
HGB BLD-MCNC: 15 G/DL — SIGNIFICANT CHANGE UP (ref 13–17)
HGB BLD-MCNC: 15.2 G/DL — SIGNIFICANT CHANGE UP (ref 13–17)
HGB BLD-MCNC: 16 G/DL — SIGNIFICANT CHANGE UP (ref 13–17)
IMM GRANULOCYTES NFR BLD AUTO: 0.3 % — SIGNIFICANT CHANGE UP (ref 0–1.5)
IMM GRANULOCYTES NFR BLD AUTO: 0.3 % — SIGNIFICANT CHANGE UP (ref 0–1.5)
INR BLD: 1.61 RATIO — HIGH (ref 0.88–1.16)
KETONES UR-MCNC: NEGATIVE — SIGNIFICANT CHANGE UP
LEUKOCYTE ESTERASE UR-ACNC: NEGATIVE — SIGNIFICANT CHANGE UP
LYMPHOCYTES # BLD AUTO: 1.62 K/UL — SIGNIFICANT CHANGE UP (ref 1–3.3)
LYMPHOCYTES # BLD AUTO: 1.96 K/UL — SIGNIFICANT CHANGE UP (ref 1–3.3)
LYMPHOCYTES # BLD AUTO: 18.3 % — SIGNIFICANT CHANGE UP (ref 13–44)
LYMPHOCYTES # BLD AUTO: 21.8 % — SIGNIFICANT CHANGE UP (ref 13–44)
MAGNESIUM SERPL-MCNC: 1.9 MG/DL — SIGNIFICANT CHANGE UP (ref 1.6–2.6)
MAGNESIUM SERPL-MCNC: 2 MG/DL — SIGNIFICANT CHANGE UP (ref 1.6–2.6)
MCHC RBC-ENTMCNC: 24.8 PG — LOW (ref 27–34)
MCHC RBC-ENTMCNC: 24.8 PG — LOW (ref 27–34)
MCHC RBC-ENTMCNC: 25.2 PG — LOW (ref 27–34)
MCHC RBC-ENTMCNC: 25.2 PG — LOW (ref 27–34)
MCHC RBC-ENTMCNC: 25.3 PG — LOW (ref 27–34)
MCHC RBC-ENTMCNC: 25.4 PG — LOW (ref 27–34)
MCHC RBC-ENTMCNC: 29.5 GM/DL — LOW (ref 32–36)
MCHC RBC-ENTMCNC: 29.8 GM/DL — LOW (ref 32–36)
MCHC RBC-ENTMCNC: 30.2 GM/DL — LOW (ref 32–36)
MCHC RBC-ENTMCNC: 30.5 GM/DL — LOW (ref 32–36)
MCHC RBC-ENTMCNC: 30.6 GM/DL — LOW (ref 32–36)
MCHC RBC-ENTMCNC: 30.9 GM/DL — LOW (ref 32–36)
MCV RBC AUTO: 81.5 FL — SIGNIFICANT CHANGE UP (ref 80–100)
MCV RBC AUTO: 81.9 FL — SIGNIFICANT CHANGE UP (ref 80–100)
MCV RBC AUTO: 82 FL — SIGNIFICANT CHANGE UP (ref 80–100)
MCV RBC AUTO: 82.6 FL — SIGNIFICANT CHANGE UP (ref 80–100)
MCV RBC AUTO: 83 FL — SIGNIFICANT CHANGE UP (ref 80–100)
MCV RBC AUTO: 83.4 FL — SIGNIFICANT CHANGE UP (ref 80–100)
MCV RBC AUTO: 83.6 FL — SIGNIFICANT CHANGE UP (ref 80–100)
MCV RBC AUTO: 84.1 FL — SIGNIFICANT CHANGE UP (ref 80–100)
MONOCYTES # BLD AUTO: 0.89 K/UL — SIGNIFICANT CHANGE UP (ref 0–0.9)
MONOCYTES # BLD AUTO: 1.01 K/UL — HIGH (ref 0–0.9)
MONOCYTES NFR BLD AUTO: 11.4 % — SIGNIFICANT CHANGE UP (ref 2–14)
MONOCYTES NFR BLD AUTO: 9.9 % — SIGNIFICANT CHANGE UP (ref 2–14)
NEUTROPHILS # BLD AUTO: 5.96 K/UL — SIGNIFICANT CHANGE UP (ref 1.8–7.4)
NEUTROPHILS # BLD AUTO: 6.06 K/UL — SIGNIFICANT CHANGE UP (ref 1.8–7.4)
NEUTROPHILS NFR BLD AUTO: 66.2 % — SIGNIFICANT CHANGE UP (ref 43–77)
NEUTROPHILS NFR BLD AUTO: 68.4 % — SIGNIFICANT CHANGE UP (ref 43–77)
NITRITE UR-MCNC: NEGATIVE — SIGNIFICANT CHANGE UP
NRBC # BLD: 0 /100 WBCS — SIGNIFICANT CHANGE UP (ref 0–0)
NT-PROBNP SERPL-SCNC: 1935 PG/ML — HIGH (ref 0–300)
NT-PROBNP SERPL-SCNC: 2126 PG/ML — HIGH (ref 0–300)
PH UR: 6 — SIGNIFICANT CHANGE UP (ref 5–8)
PHOSPHATE SERPL-MCNC: 3.9 MG/DL — SIGNIFICANT CHANGE UP (ref 2.5–4.5)
PHOSPHATE SERPL-MCNC: 4.3 MG/DL — SIGNIFICANT CHANGE UP (ref 2.5–4.5)
PLATELET # BLD AUTO: 191 K/UL — SIGNIFICANT CHANGE UP (ref 150–400)
PLATELET # BLD AUTO: 197 K/UL — SIGNIFICANT CHANGE UP (ref 150–400)
PLATELET # BLD AUTO: 200 K/UL — SIGNIFICANT CHANGE UP (ref 150–400)
PLATELET # BLD AUTO: 206 K/UL — SIGNIFICANT CHANGE UP (ref 150–400)
PLATELET # BLD AUTO: 209 K/UL — SIGNIFICANT CHANGE UP (ref 150–400)
PLATELET # BLD AUTO: 223 K/UL — SIGNIFICANT CHANGE UP (ref 150–400)
POTASSIUM SERPL-MCNC: 3.4 MMOL/L — LOW (ref 3.5–5.3)
POTASSIUM SERPL-MCNC: 3.5 MMOL/L — SIGNIFICANT CHANGE UP (ref 3.5–5.3)
POTASSIUM SERPL-MCNC: 3.6 MMOL/L — SIGNIFICANT CHANGE UP (ref 3.5–5.3)
POTASSIUM SERPL-MCNC: 3.7 MMOL/L — SIGNIFICANT CHANGE UP (ref 3.5–5.3)
POTASSIUM SERPL-MCNC: 4 MMOL/L — SIGNIFICANT CHANGE UP (ref 3.5–5.3)
POTASSIUM SERPL-MCNC: 4.2 MMOL/L — SIGNIFICANT CHANGE UP (ref 3.5–5.3)
POTASSIUM SERPL-SCNC: 3.4 MMOL/L — LOW (ref 3.5–5.3)
POTASSIUM SERPL-SCNC: 3.5 MMOL/L — SIGNIFICANT CHANGE UP (ref 3.5–5.3)
POTASSIUM SERPL-SCNC: 3.6 MMOL/L — SIGNIFICANT CHANGE UP (ref 3.5–5.3)
POTASSIUM SERPL-SCNC: 3.7 MMOL/L — SIGNIFICANT CHANGE UP (ref 3.5–5.3)
POTASSIUM SERPL-SCNC: 4 MMOL/L — SIGNIFICANT CHANGE UP (ref 3.5–5.3)
POTASSIUM SERPL-SCNC: 4.2 MMOL/L — SIGNIFICANT CHANGE UP (ref 3.5–5.3)
PROT ?TM UR-MCNC: 185 MG/DL — HIGH (ref 0–12)
PROT SERPL-MCNC: 7.5 G/DL — SIGNIFICANT CHANGE UP (ref 6–8.3)
PROT UR-MCNC: ABNORMAL
PROT/CREAT UR-RTO: 3.3 RATIO — HIGH (ref 0–0.2)
PROTHROM AB SERPL-ACNC: 18.9 SEC — HIGH (ref 10.6–13.6)
RBC # BLD: 5.74 M/UL — SIGNIFICANT CHANGE UP (ref 4.2–5.8)
RBC # BLD: 5.88 M/UL — HIGH (ref 4.2–5.8)
RBC # BLD: 5.92 M/UL — HIGH (ref 4.2–5.8)
RBC # BLD: 5.92 M/UL — HIGH (ref 4.2–5.8)
RBC # BLD: 5.93 M/UL — HIGH (ref 4.2–5.8)
RBC # BLD: 5.99 M/UL — HIGH (ref 4.2–5.8)
RBC # BLD: 6.05 M/UL — HIGH (ref 4.2–5.8)
RBC # BLD: 6.33 M/UL — HIGH (ref 4.2–5.8)
RBC # FLD: 16.8 % — HIGH (ref 10.3–14.5)
RBC # FLD: 16.8 % — HIGH (ref 10.3–14.5)
RBC # FLD: 16.9 % — HIGH (ref 10.3–14.5)
RBC # FLD: 17 % — HIGH (ref 10.3–14.5)
RBC # FLD: 17.1 % — HIGH (ref 10.3–14.5)
RBC # FLD: 17.2 % — HIGH (ref 10.3–14.5)
RBC # FLD: 17.3 % — HIGH (ref 10.3–14.5)
RBC # FLD: 17.7 % — HIGH (ref 10.3–14.5)
SARS-COV-2 RNA SPEC QL NAA+PROBE: SIGNIFICANT CHANGE UP
SODIUM SERPL-SCNC: 134 MMOL/L — LOW (ref 135–145)
SODIUM SERPL-SCNC: 134 MMOL/L — LOW (ref 135–145)
SODIUM SERPL-SCNC: 135 MMOL/L — SIGNIFICANT CHANGE UP (ref 135–145)
SODIUM SERPL-SCNC: 136 MMOL/L — SIGNIFICANT CHANGE UP (ref 135–145)
SODIUM SERPL-SCNC: 137 MMOL/L — SIGNIFICANT CHANGE UP (ref 135–145)
SODIUM SERPL-SCNC: 137 MMOL/L — SIGNIFICANT CHANGE UP (ref 135–145)
SP GR SPEC: 1.01 — SIGNIFICANT CHANGE UP (ref 1.01–1.02)
TROPONIN T, HIGH SENSITIVITY RESULT: 68 NG/L — HIGH (ref 0–51)
TROPONIN T, HIGH SENSITIVITY RESULT: 79 NG/L — HIGH (ref 0–51)
UROBILINOGEN FLD QL: NEGATIVE — SIGNIFICANT CHANGE UP
WBC # BLD: 10.04 K/UL — SIGNIFICANT CHANGE UP (ref 3.8–10.5)
WBC # BLD: 11.9 K/UL — HIGH (ref 3.8–10.5)
WBC # BLD: 12.94 K/UL — HIGH (ref 3.8–10.5)
WBC # BLD: 8.86 K/UL — SIGNIFICANT CHANGE UP (ref 3.8–10.5)
WBC # BLD: 9.01 K/UL — SIGNIFICANT CHANGE UP (ref 3.8–10.5)
WBC # BLD: 9.08 K/UL — SIGNIFICANT CHANGE UP (ref 3.8–10.5)
WBC # BLD: 9.28 K/UL — SIGNIFICANT CHANGE UP (ref 3.8–10.5)
WBC # BLD: 9.62 K/UL — SIGNIFICANT CHANGE UP (ref 3.8–10.5)
WBC # FLD AUTO: 10.04 K/UL — SIGNIFICANT CHANGE UP (ref 3.8–10.5)
WBC # FLD AUTO: 11.9 K/UL — HIGH (ref 3.8–10.5)
WBC # FLD AUTO: 12.94 K/UL — HIGH (ref 3.8–10.5)
WBC # FLD AUTO: 8.86 K/UL — SIGNIFICANT CHANGE UP (ref 3.8–10.5)
WBC # FLD AUTO: 9.01 K/UL — SIGNIFICANT CHANGE UP (ref 3.8–10.5)
WBC # FLD AUTO: 9.08 K/UL — SIGNIFICANT CHANGE UP (ref 3.8–10.5)
WBC # FLD AUTO: 9.28 K/UL — SIGNIFICANT CHANGE UP (ref 3.8–10.5)
WBC # FLD AUTO: 9.62 K/UL — SIGNIFICANT CHANGE UP (ref 3.8–10.5)

## 2021-01-01 PROCEDURE — 96374 THER/PROPH/DIAG INJ IV PUSH: CPT

## 2021-01-01 PROCEDURE — 83036 HEMOGLOBIN GLYCOSYLATED A1C: CPT

## 2021-01-01 PROCEDURE — 85027 COMPLETE CBC AUTOMATED: CPT

## 2021-01-01 PROCEDURE — 93010 ELECTROCARDIOGRAM REPORT: CPT | Mod: NC

## 2021-01-01 PROCEDURE — 85610 PROTHROMBIN TIME: CPT

## 2021-01-01 PROCEDURE — U0003: CPT

## 2021-01-01 PROCEDURE — U0005: CPT

## 2021-01-01 PROCEDURE — 85025 COMPLETE CBC W/AUTO DIFF WBC: CPT

## 2021-01-01 PROCEDURE — 99232 SBSQ HOSP IP/OBS MODERATE 35: CPT

## 2021-01-01 PROCEDURE — 99285 EMERGENCY DEPT VISIT HI MDM: CPT

## 2021-01-01 PROCEDURE — 84156 ASSAY OF PROTEIN URINE: CPT

## 2021-01-01 PROCEDURE — 93005 ELECTROCARDIOGRAM TRACING: CPT

## 2021-01-01 PROCEDURE — 99223 1ST HOSP IP/OBS HIGH 75: CPT

## 2021-01-01 PROCEDURE — 70547 MR ANGIOGRAPHY NECK W/O DYE: CPT

## 2021-01-01 PROCEDURE — 97602 WOUND(S) CARE NON-SELECTIVE: CPT

## 2021-01-01 PROCEDURE — 80053 COMPREHEN METABOLIC PANEL: CPT

## 2021-01-01 PROCEDURE — 85730 THROMBOPLASTIN TIME PARTIAL: CPT

## 2021-01-01 PROCEDURE — 97162 PT EVAL MOD COMPLEX 30 MIN: CPT

## 2021-01-01 PROCEDURE — 84484 ASSAY OF TROPONIN QUANT: CPT

## 2021-01-01 PROCEDURE — 71045 X-RAY EXAM CHEST 1 VIEW: CPT

## 2021-01-01 PROCEDURE — 36415 COLL VENOUS BLD VENIPUNCTURE: CPT

## 2021-01-01 PROCEDURE — 97530 THERAPEUTIC ACTIVITIES: CPT

## 2021-01-01 PROCEDURE — 93880 EXTRACRANIAL BILAT STUDY: CPT

## 2021-01-01 PROCEDURE — 97110 THERAPEUTIC EXERCISES: CPT

## 2021-01-01 PROCEDURE — 82962 GLUCOSE BLOOD TEST: CPT

## 2021-01-01 PROCEDURE — 70450 CT HEAD/BRAIN W/O DYE: CPT | Mod: 26,MA

## 2021-01-01 PROCEDURE — 99221 1ST HOSP IP/OBS SF/LOW 40: CPT

## 2021-01-01 PROCEDURE — 70547 MR ANGIOGRAPHY NECK W/O DYE: CPT | Mod: 26

## 2021-01-01 PROCEDURE — 81001 URINALYSIS AUTO W/SCOPE: CPT

## 2021-01-01 PROCEDURE — 83735 ASSAY OF MAGNESIUM: CPT

## 2021-01-01 PROCEDURE — 83880 ASSAY OF NATRIURETIC PEPTIDE: CPT

## 2021-01-01 PROCEDURE — 82570 ASSAY OF URINE CREATININE: CPT

## 2021-01-01 PROCEDURE — 93880 EXTRACRANIAL BILAT STUDY: CPT | Mod: 26

## 2021-01-01 PROCEDURE — 97116 GAIT TRAINING THERAPY: CPT

## 2021-01-01 PROCEDURE — 70450 CT HEAD/BRAIN W/O DYE: CPT | Mod: MA

## 2021-01-01 PROCEDURE — 84100 ASSAY OF PHOSPHORUS: CPT

## 2021-01-01 PROCEDURE — 71045 X-RAY EXAM CHEST 1 VIEW: CPT | Mod: 26

## 2021-01-01 PROCEDURE — 80048 BASIC METABOLIC PNL TOTAL CA: CPT

## 2021-01-01 RX ORDER — INSULIN GLARGINE 100 [IU]/ML
25 INJECTION, SOLUTION SUBCUTANEOUS ONCE
Refills: 0 | Status: COMPLETED | OUTPATIENT
Start: 2021-01-01 | End: 2021-01-01

## 2021-01-01 RX ORDER — DILTIAZEM HCL 120 MG
120 CAPSULE, EXT RELEASE 24 HR ORAL DAILY
Refills: 0 | Status: DISCONTINUED | OUTPATIENT
Start: 2021-01-01 | End: 2021-01-01

## 2021-01-01 RX ORDER — METOPROLOL TARTRATE 50 MG
50 TABLET ORAL
Refills: 0 | Status: DISCONTINUED | OUTPATIENT
Start: 2021-01-01 | End: 2021-01-01

## 2021-01-01 RX ORDER — DEXTROSE 50 % IN WATER 50 %
25 SYRINGE (ML) INTRAVENOUS ONCE
Refills: 0 | Status: DISCONTINUED | OUTPATIENT
Start: 2021-01-01 | End: 2021-01-01

## 2021-01-01 RX ORDER — GLUCAGON INJECTION, SOLUTION 0.5 MG/.1ML
1 INJECTION, SOLUTION SUBCUTANEOUS ONCE
Refills: 0 | Status: DISCONTINUED | OUTPATIENT
Start: 2021-01-01 | End: 2021-01-01

## 2021-01-01 RX ORDER — HEPARIN SODIUM 5000 [USP'U]/ML
2000 INJECTION INTRAVENOUS; SUBCUTANEOUS
Qty: 25000 | Refills: 0 | Status: DISCONTINUED | OUTPATIENT
Start: 2021-01-01 | End: 2021-01-01

## 2021-01-01 RX ORDER — HEPARIN SODIUM 5000 [USP'U]/ML
5000 INJECTION INTRAVENOUS; SUBCUTANEOUS EVERY 6 HOURS
Refills: 0 | Status: DISCONTINUED | OUTPATIENT
Start: 2021-01-01 | End: 2021-01-01

## 2021-01-01 RX ORDER — SODIUM CHLORIDE 9 MG/ML
1000 INJECTION, SOLUTION INTRAVENOUS
Refills: 0 | Status: DISCONTINUED | OUTPATIENT
Start: 2021-01-01 | End: 2021-01-01

## 2021-01-01 RX ORDER — HEPARIN SODIUM 5000 [USP'U]/ML
10000 INJECTION INTRAVENOUS; SUBCUTANEOUS ONCE
Refills: 0 | Status: COMPLETED | OUTPATIENT
Start: 2021-01-01 | End: 2021-01-01

## 2021-01-01 RX ORDER — POTASSIUM CHLORIDE 20 MEQ
40 PACKET (EA) ORAL ONCE
Refills: 0 | Status: COMPLETED | OUTPATIENT
Start: 2021-01-01 | End: 2021-01-01

## 2021-01-01 RX ORDER — INSULIN GLARGINE 100 [IU]/ML
25 INJECTION, SOLUTION SUBCUTANEOUS
Qty: 0 | Refills: 0 | DISCHARGE

## 2021-01-01 RX ORDER — METOPROLOL TARTRATE 50 MG
50 TABLET ORAL DAILY
Refills: 0 | Status: DISCONTINUED | OUTPATIENT
Start: 2021-01-01 | End: 2021-01-01

## 2021-01-01 RX ORDER — ASPIRIN/CALCIUM CARB/MAGNESIUM 324 MG
81 TABLET ORAL DAILY
Refills: 0 | Status: DISCONTINUED | OUTPATIENT
Start: 2021-01-01 | End: 2021-01-01

## 2021-01-01 RX ORDER — ALLOPURINOL 300 MG
200 TABLET ORAL DAILY
Refills: 0 | Status: DISCONTINUED | OUTPATIENT
Start: 2021-01-01 | End: 2021-01-01

## 2021-01-01 RX ORDER — INSULIN LISPRO 100/ML
VIAL (ML) SUBCUTANEOUS
Refills: 0 | Status: DISCONTINUED | OUTPATIENT
Start: 2021-01-01 | End: 2021-01-01

## 2021-01-01 RX ORDER — DEXTROSE 50 % IN WATER 50 %
12.5 SYRINGE (ML) INTRAVENOUS ONCE
Refills: 0 | Status: DISCONTINUED | OUTPATIENT
Start: 2021-01-01 | End: 2021-01-01

## 2021-01-01 RX ORDER — LISINOPRIL 2.5 MG/1
5 TABLET ORAL DAILY
Refills: 0 | Status: DISCONTINUED | OUTPATIENT
Start: 2021-01-01 | End: 2021-01-01

## 2021-01-01 RX ORDER — LISINOPRIL 2.5 MG/1
1 TABLET ORAL
Qty: 30 | Refills: 0
Start: 2021-01-01 | End: 2021-01-01

## 2021-01-01 RX ORDER — INSULIN LISPRO 100/ML
VIAL (ML) SUBCUTANEOUS AT BEDTIME
Refills: 0 | Status: DISCONTINUED | OUTPATIENT
Start: 2021-01-01 | End: 2021-01-01

## 2021-01-01 RX ORDER — HEPARIN SODIUM 5000 [USP'U]/ML
10000 INJECTION INTRAVENOUS; SUBCUTANEOUS EVERY 6 HOURS
Refills: 0 | Status: DISCONTINUED | OUTPATIENT
Start: 2021-01-01 | End: 2021-01-01

## 2021-01-01 RX ORDER — ACETAMINOPHEN 500 MG
650 TABLET ORAL EVERY 6 HOURS
Refills: 0 | Status: DISCONTINUED | OUTPATIENT
Start: 2021-01-01 | End: 2021-01-01

## 2021-01-01 RX ORDER — METOPROLOL TARTRATE 50 MG
75 TABLET ORAL
Refills: 0 | Status: DISCONTINUED | OUTPATIENT
Start: 2021-01-01 | End: 2021-01-01

## 2021-01-01 RX ORDER — OXYCODONE AND ACETAMINOPHEN 5; 325 MG/1; MG/1
1 TABLET ORAL EVERY 12 HOURS
Refills: 0 | Status: DISCONTINUED | OUTPATIENT
Start: 2021-01-01 | End: 2021-01-01

## 2021-01-01 RX ORDER — HEPARIN SODIUM 5000 [USP'U]/ML
INJECTION INTRAVENOUS; SUBCUTANEOUS
Qty: 25000 | Refills: 0 | Status: DISCONTINUED | OUTPATIENT
Start: 2021-01-01 | End: 2021-01-01

## 2021-01-01 RX ORDER — INSULIN ASPART 100 [IU]/ML
10 INJECTION, SOLUTION SUBCUTANEOUS
Qty: 0 | Refills: 0 | DISCHARGE

## 2021-01-01 RX ORDER — ACETAMINOPHEN 500 MG
2 TABLET ORAL
Qty: 0 | Refills: 0 | DISCHARGE
Start: 2021-01-01

## 2021-01-01 RX ORDER — BUMETANIDE 0.25 MG/ML
2 INJECTION INTRAMUSCULAR; INTRAVENOUS DAILY
Refills: 0 | Status: DISCONTINUED | OUTPATIENT
Start: 2021-01-01 | End: 2021-01-01

## 2021-01-01 RX ORDER — INSULIN LISPRO 100/ML
10 VIAL (ML) SUBCUTANEOUS
Refills: 0 | Status: DISCONTINUED | OUTPATIENT
Start: 2021-01-01 | End: 2021-01-01

## 2021-01-01 RX ORDER — LIDOCAINE 4 G/100G
1 CREAM TOPICAL DAILY
Refills: 0 | Status: DISCONTINUED | OUTPATIENT
Start: 2021-01-01 | End: 2021-01-01

## 2021-01-01 RX ORDER — INSULIN GLARGINE 100 [IU]/ML
25 INJECTION, SOLUTION SUBCUTANEOUS AT BEDTIME
Refills: 0 | Status: DISCONTINUED | OUTPATIENT
Start: 2021-01-01 | End: 2021-01-01

## 2021-01-01 RX ORDER — INSULIN LISPRO 100/ML
12 VIAL (ML) SUBCUTANEOUS
Qty: 0 | Refills: 0 | DISCHARGE

## 2021-01-01 RX ORDER — DEXTROSE 50 % IN WATER 50 %
15 SYRINGE (ML) INTRAVENOUS ONCE
Refills: 0 | Status: DISCONTINUED | OUTPATIENT
Start: 2021-01-01 | End: 2021-01-01

## 2021-01-01 RX ORDER — DABIGATRAN ETEXILATE MESYLATE 150 MG/1
150 CAPSULE ORAL EVERY 12 HOURS
Refills: 0 | Status: DISCONTINUED | OUTPATIENT
Start: 2021-01-01 | End: 2021-01-01

## 2021-01-01 RX ADMIN — LIDOCAINE 1 PATCH: 4 CREAM TOPICAL at 12:04

## 2021-01-01 RX ADMIN — Medication 50 MILLIGRAM(S): at 06:40

## 2021-01-01 RX ADMIN — Medication 650 MILLIGRAM(S): at 13:41

## 2021-01-01 RX ADMIN — OXYCODONE AND ACETAMINOPHEN 1 TABLET(S): 5; 325 TABLET ORAL at 06:31

## 2021-01-01 RX ADMIN — Medication 81 MILLIGRAM(S): at 12:25

## 2021-01-01 RX ADMIN — Medication 100 MILLIGRAM(S): at 05:52

## 2021-01-01 RX ADMIN — HEPARIN SODIUM 1900 UNIT(S)/HR: 5000 INJECTION INTRAVENOUS; SUBCUTANEOUS at 15:16

## 2021-01-01 RX ADMIN — Medication 10 UNIT(S): at 08:13

## 2021-01-01 RX ADMIN — Medication 100 MILLIGRAM(S): at 21:20

## 2021-01-01 RX ADMIN — Medication 100 MILLIGRAM(S): at 15:24

## 2021-01-01 RX ADMIN — Medication 100 MILLIGRAM(S): at 05:05

## 2021-01-01 RX ADMIN — Medication 10 UNIT(S): at 18:30

## 2021-01-01 RX ADMIN — HEPARIN SODIUM 5000 UNIT(S): 5000 INJECTION INTRAVENOUS; SUBCUTANEOUS at 07:35

## 2021-01-01 RX ADMIN — Medication 10 UNIT(S): at 08:01

## 2021-01-01 RX ADMIN — Medication 1: at 21:46

## 2021-01-01 RX ADMIN — OXYCODONE AND ACETAMINOPHEN 1 TABLET(S): 5; 325 TABLET ORAL at 17:13

## 2021-01-01 RX ADMIN — Medication 50 MILLIGRAM(S): at 17:42

## 2021-01-01 RX ADMIN — OXYCODONE AND ACETAMINOPHEN 1 TABLET(S): 5; 325 TABLET ORAL at 06:58

## 2021-01-01 RX ADMIN — Medication 120 MILLIGRAM(S): at 05:06

## 2021-01-01 RX ADMIN — Medication 10 UNIT(S): at 12:24

## 2021-01-01 RX ADMIN — Medication 650 MILLIGRAM(S): at 22:17

## 2021-01-01 RX ADMIN — OXYCODONE AND ACETAMINOPHEN 1 TABLET(S): 5; 325 TABLET ORAL at 09:00

## 2021-01-01 RX ADMIN — Medication 100 MILLIGRAM(S): at 13:22

## 2021-01-01 RX ADMIN — Medication 1: at 12:25

## 2021-01-01 RX ADMIN — Medication 40 MILLIEQUIVALENT(S): at 17:19

## 2021-01-01 RX ADMIN — Medication 10 MILLIGRAM(S): at 12:03

## 2021-01-01 RX ADMIN — Medication 50 MILLIGRAM(S): at 05:05

## 2021-01-01 RX ADMIN — Medication 3: at 11:53

## 2021-01-01 RX ADMIN — DABIGATRAN ETEXILATE MESYLATE 150 MILLIGRAM(S): 150 CAPSULE ORAL at 18:57

## 2021-01-01 RX ADMIN — DABIGATRAN ETEXILATE MESYLATE 150 MILLIGRAM(S): 150 CAPSULE ORAL at 17:27

## 2021-01-01 RX ADMIN — OXYCODONE AND ACETAMINOPHEN 1 TABLET(S): 5; 325 TABLET ORAL at 06:33

## 2021-01-01 RX ADMIN — Medication 10 UNIT(S): at 17:14

## 2021-01-01 RX ADMIN — Medication 0: at 21:13

## 2021-01-01 RX ADMIN — Medication 0: at 22:14

## 2021-01-01 RX ADMIN — Medication 120 MILLIGRAM(S): at 08:46

## 2021-01-01 RX ADMIN — Medication 650 MILLIGRAM(S): at 05:08

## 2021-01-01 RX ADMIN — Medication 100 MILLIGRAM(S): at 22:02

## 2021-01-01 RX ADMIN — Medication 4: at 16:48

## 2021-01-01 RX ADMIN — Medication 10 UNIT(S): at 08:27

## 2021-01-01 RX ADMIN — HEPARIN SODIUM 1600 UNIT(S)/HR: 5000 INJECTION INTRAVENOUS; SUBCUTANEOUS at 17:52

## 2021-01-01 RX ADMIN — Medication 50 MILLIGRAM(S): at 17:27

## 2021-01-01 RX ADMIN — HEPARIN SODIUM 1600 UNIT(S)/HR: 5000 INJECTION INTRAVENOUS; SUBCUTANEOUS at 07:33

## 2021-01-01 RX ADMIN — Medication 10 UNIT(S): at 09:15

## 2021-01-01 RX ADMIN — Medication 100 MILLIGRAM(S): at 14:51

## 2021-01-01 RX ADMIN — Medication 1: at 16:47

## 2021-01-01 RX ADMIN — LIDOCAINE 1 PATCH: 4 CREAM TOPICAL at 00:15

## 2021-01-01 RX ADMIN — HEPARIN SODIUM 5000 UNIT(S): 5000 INJECTION INTRAVENOUS; SUBCUTANEOUS at 07:48

## 2021-01-01 RX ADMIN — Medication 10 UNIT(S): at 12:09

## 2021-01-01 RX ADMIN — Medication 1: at 08:46

## 2021-01-01 RX ADMIN — Medication 50 MILLIGRAM(S): at 05:27

## 2021-01-01 RX ADMIN — BUMETANIDE 2 MILLIGRAM(S): 0.25 INJECTION INTRAMUSCULAR; INTRAVENOUS at 05:49

## 2021-01-01 RX ADMIN — OXYCODONE AND ACETAMINOPHEN 1 TABLET(S): 5; 325 TABLET ORAL at 18:01

## 2021-01-01 RX ADMIN — BUMETANIDE 2 MILLIGRAM(S): 0.25 INJECTION INTRAMUSCULAR; INTRAVENOUS at 09:14

## 2021-01-01 RX ADMIN — HEPARIN SODIUM 1600 UNIT(S)/HR: 5000 INJECTION INTRAVENOUS; SUBCUTANEOUS at 22:02

## 2021-01-01 RX ADMIN — DABIGATRAN ETEXILATE MESYLATE 150 MILLIGRAM(S): 150 CAPSULE ORAL at 05:49

## 2021-01-01 RX ADMIN — Medication 1: at 17:15

## 2021-01-01 RX ADMIN — Medication 10 UNIT(S): at 08:08

## 2021-01-01 RX ADMIN — Medication 10 UNIT(S): at 17:16

## 2021-01-01 RX ADMIN — HEPARIN SODIUM 2000 UNIT(S)/HR: 5000 INJECTION INTRAVENOUS; SUBCUTANEOUS at 06:32

## 2021-01-01 RX ADMIN — Medication 10 UNIT(S): at 08:46

## 2021-01-01 RX ADMIN — OXYCODONE AND ACETAMINOPHEN 1 TABLET(S): 5; 325 TABLET ORAL at 05:05

## 2021-01-01 RX ADMIN — OXYCODONE AND ACETAMINOPHEN 1 TABLET(S): 5; 325 TABLET ORAL at 05:27

## 2021-01-01 RX ADMIN — Medication 120 MILLIGRAM(S): at 09:53

## 2021-01-01 RX ADMIN — INSULIN GLARGINE 25 UNIT(S): 100 INJECTION, SOLUTION SUBCUTANEOUS at 21:22

## 2021-01-01 RX ADMIN — BUMETANIDE 2 MILLIGRAM(S): 0.25 INJECTION INTRAMUSCULAR; INTRAVENOUS at 09:53

## 2021-01-01 RX ADMIN — Medication 100 MILLIGRAM(S): at 22:15

## 2021-01-01 RX ADMIN — OXYCODONE AND ACETAMINOPHEN 1 TABLET(S): 5; 325 TABLET ORAL at 17:42

## 2021-01-01 RX ADMIN — Medication 120 MILLIGRAM(S): at 05:03

## 2021-01-01 RX ADMIN — OXYCODONE AND ACETAMINOPHEN 1 TABLET(S): 5; 325 TABLET ORAL at 05:52

## 2021-01-01 RX ADMIN — Medication 10 UNIT(S): at 17:06

## 2021-01-01 RX ADMIN — Medication 1: at 08:28

## 2021-01-01 RX ADMIN — HEPARIN SODIUM 0 UNIT(S)/HR: 5000 INJECTION INTRAVENOUS; SUBCUTANEOUS at 16:15

## 2021-01-01 RX ADMIN — Medication 50 MILLIGRAM(S): at 05:03

## 2021-01-01 RX ADMIN — OXYCODONE AND ACETAMINOPHEN 1 TABLET(S): 5; 325 TABLET ORAL at 05:04

## 2021-01-01 RX ADMIN — Medication 100 MILLIGRAM(S): at 14:08

## 2021-01-01 RX ADMIN — Medication 50 MILLIGRAM(S): at 17:14

## 2021-01-01 RX ADMIN — OXYCODONE AND ACETAMINOPHEN 1 TABLET(S): 5; 325 TABLET ORAL at 18:12

## 2021-01-01 RX ADMIN — HEPARIN SODIUM 10000 UNIT(S): 5000 INJECTION INTRAVENOUS; SUBCUTANEOUS at 22:01

## 2021-01-01 RX ADMIN — Medication 10 UNIT(S): at 16:48

## 2021-01-01 RX ADMIN — LIDOCAINE 1 PATCH: 4 CREAM TOPICAL at 11:16

## 2021-01-01 RX ADMIN — Medication 2: at 12:09

## 2021-01-01 RX ADMIN — BUMETANIDE 2 MILLIGRAM(S): 0.25 INJECTION INTRAMUSCULAR; INTRAVENOUS at 08:46

## 2021-01-01 RX ADMIN — OXYCODONE AND ACETAMINOPHEN 1 TABLET(S): 5; 325 TABLET ORAL at 18:04

## 2021-01-01 RX ADMIN — INSULIN GLARGINE 25 UNIT(S): 100 INJECTION, SOLUTION SUBCUTANEOUS at 21:45

## 2021-01-01 RX ADMIN — Medication 650 MILLIGRAM(S): at 04:36

## 2021-01-01 RX ADMIN — Medication 100 MILLIGRAM(S): at 21:23

## 2021-01-01 RX ADMIN — Medication 200 MILLIGRAM(S): at 11:42

## 2021-01-01 RX ADMIN — Medication 50 MILLIGRAM(S): at 05:49

## 2021-01-01 RX ADMIN — Medication 650 MILLIGRAM(S): at 14:51

## 2021-01-01 RX ADMIN — OXYCODONE AND ACETAMINOPHEN 1 TABLET(S): 5; 325 TABLET ORAL at 19:50

## 2021-01-01 RX ADMIN — Medication 50 MILLIGRAM(S): at 05:06

## 2021-01-01 RX ADMIN — Medication 10 UNIT(S): at 16:47

## 2021-01-01 RX ADMIN — Medication 100 MILLIGRAM(S): at 21:45

## 2021-01-01 RX ADMIN — Medication 1: at 13:21

## 2021-01-01 RX ADMIN — Medication 120 MILLIGRAM(S): at 05:49

## 2021-01-01 RX ADMIN — Medication 2: at 08:01

## 2021-01-01 RX ADMIN — HEPARIN SODIUM 1300 UNIT(S)/HR: 5000 INJECTION INTRAVENOUS; SUBCUTANEOUS at 07:40

## 2021-01-01 RX ADMIN — OXYCODONE AND ACETAMINOPHEN 1 TABLET(S): 5; 325 TABLET ORAL at 06:22

## 2021-01-01 RX ADMIN — Medication 50 MILLIGRAM(S): at 17:07

## 2021-01-01 RX ADMIN — Medication 50 MILLIGRAM(S): at 18:36

## 2021-01-01 RX ADMIN — Medication 10 UNIT(S): at 12:02

## 2021-01-01 RX ADMIN — OXYCODONE AND ACETAMINOPHEN 1 TABLET(S): 5; 325 TABLET ORAL at 17:11

## 2021-01-01 RX ADMIN — OXYCODONE AND ACETAMINOPHEN 1 TABLET(S): 5; 325 TABLET ORAL at 17:19

## 2021-01-01 RX ADMIN — BUMETANIDE 2 MILLIGRAM(S): 0.25 INJECTION INTRAMUSCULAR; INTRAVENOUS at 05:05

## 2021-01-01 RX ADMIN — Medication 1: at 22:01

## 2021-01-01 RX ADMIN — Medication 200 MILLIGRAM(S): at 12:26

## 2021-01-01 RX ADMIN — INSULIN GLARGINE 25 UNIT(S): 100 INJECTION, SOLUTION SUBCUTANEOUS at 22:16

## 2021-01-01 RX ADMIN — Medication 200 MILLIGRAM(S): at 11:18

## 2021-01-01 RX ADMIN — INSULIN GLARGINE 25 UNIT(S): 100 INJECTION, SOLUTION SUBCUTANEOUS at 21:20

## 2021-01-01 RX ADMIN — Medication 10 UNIT(S): at 13:21

## 2021-01-01 RX ADMIN — Medication 81 MILLIGRAM(S): at 13:22

## 2021-01-01 RX ADMIN — Medication 650 MILLIGRAM(S): at 22:50

## 2021-01-01 RX ADMIN — Medication 81 MILLIGRAM(S): at 11:43

## 2021-01-01 RX ADMIN — HEPARIN SODIUM 1600 UNIT(S)/HR: 5000 INJECTION INTRAVENOUS; SUBCUTANEOUS at 14:29

## 2021-01-01 RX ADMIN — Medication 100 MILLIGRAM(S): at 06:40

## 2021-01-01 RX ADMIN — Medication 200 MILLIGRAM(S): at 12:03

## 2021-01-01 RX ADMIN — Medication 5: at 08:09

## 2021-01-01 RX ADMIN — Medication 2: at 08:12

## 2021-01-01 RX ADMIN — Medication 1: at 09:15

## 2021-01-01 RX ADMIN — Medication 100 MILLIGRAM(S): at 05:27

## 2021-01-01 RX ADMIN — OXYCODONE AND ACETAMINOPHEN 1 TABLET(S): 5; 325 TABLET ORAL at 06:40

## 2021-01-01 RX ADMIN — Medication 650 MILLIGRAM(S): at 16:20

## 2021-01-01 RX ADMIN — Medication 4: at 21:21

## 2021-01-01 RX ADMIN — Medication 81 MILLIGRAM(S): at 11:18

## 2021-01-01 RX ADMIN — HEPARIN SODIUM 1900 UNIT(S)/HR: 5000 INJECTION INTRAVENOUS; SUBCUTANEOUS at 07:46

## 2021-01-01 RX ADMIN — HEPARIN SODIUM 1300 UNIT(S)/HR: 5000 INJECTION INTRAVENOUS; SUBCUTANEOUS at 00:48

## 2021-01-01 RX ADMIN — HEPARIN SODIUM 2400 UNIT(S)/HR: 5000 INJECTION INTRAVENOUS; SUBCUTANEOUS at 22:03

## 2021-01-01 RX ADMIN — OXYCODONE AND ACETAMINOPHEN 1 TABLET(S): 5; 325 TABLET ORAL at 19:02

## 2021-01-01 RX ADMIN — OXYCODONE AND ACETAMINOPHEN 1 TABLET(S): 5; 325 TABLET ORAL at 18:49

## 2021-01-01 RX ADMIN — OXYCODONE AND ACETAMINOPHEN 1 TABLET(S): 5; 325 TABLET ORAL at 17:34

## 2021-01-01 RX ADMIN — HEPARIN SODIUM 1300 UNIT(S)/HR: 5000 INJECTION INTRAVENOUS; SUBCUTANEOUS at 14:54

## 2021-01-01 RX ADMIN — Medication 100 MILLIGRAM(S): at 14:17

## 2021-01-01 RX ADMIN — Medication 100 MILLIGRAM(S): at 14:30

## 2021-01-01 RX ADMIN — BUMETANIDE 2 MILLIGRAM(S): 0.25 INJECTION INTRAMUSCULAR; INTRAVENOUS at 05:03

## 2021-01-01 RX ADMIN — Medication 2: at 12:02

## 2021-01-01 RX ADMIN — Medication 120 MILLIGRAM(S): at 05:04

## 2021-01-01 RX ADMIN — Medication 200 MILLIGRAM(S): at 13:22

## 2021-01-01 RX ADMIN — Medication 81 MILLIGRAM(S): at 12:03

## 2021-01-01 RX ADMIN — Medication 81 MILLIGRAM(S): at 12:02

## 2021-01-01 RX ADMIN — Medication 200 MILLIGRAM(S): at 12:02

## 2021-01-01 RX ADMIN — Medication 1: at 12:02

## 2021-01-01 RX ADMIN — LISINOPRIL 5 MILLIGRAM(S): 2.5 TABLET ORAL at 14:08

## 2021-01-01 RX ADMIN — INSULIN GLARGINE 25 UNIT(S): 100 INJECTION, SOLUTION SUBCUTANEOUS at 02:00

## 2021-01-01 RX ADMIN — Medication 650 MILLIGRAM(S): at 12:26

## 2021-01-01 RX ADMIN — Medication 3: at 17:16

## 2021-01-01 RX ADMIN — Medication 2: at 17:07

## 2021-01-01 RX ADMIN — HEPARIN SODIUM 1300 UNIT(S)/HR: 5000 INJECTION INTRAVENOUS; SUBCUTANEOUS at 09:52

## 2021-01-01 RX ADMIN — INSULIN GLARGINE 25 UNIT(S): 100 INJECTION, SOLUTION SUBCUTANEOUS at 22:01

## 2021-01-01 RX ADMIN — Medication 10 UNIT(S): at 11:52

## 2021-01-01 RX ADMIN — OXYCODONE AND ACETAMINOPHEN 1 TABLET(S): 5; 325 TABLET ORAL at 18:36

## 2021-08-12 NOTE — CONSULT NOTE ADULT - HEMATOLOGY/LYMPHATICS
[No change from previous assessment] : No change from previous assessment [Patient descended without problem for 9 minutes] : Patient descended without problem for 9 minutes [No dizziness or thirst] :  No dizziness or thirst [No ear problems] : No ear problems [Vital signs stable] : Vital signs stable [Tolerating dive well] : Tolerating dive well [No Chest Pain, shortness of breath] : No Chest Pain, shortness of breath [Respiratory Rate Stable] : Respiratory Rate Stable [No chest pain, shortness of breath, or ear pain] :  No chest pain, shortness of breath, or ear pain  [Tolerated Ascent well] : Tolerated Ascent well [Vital Signs stable] : Vital Signs stable [A physician was present throughout the entire HBOT] : A physician was present throughout the entire HBOT [No] : No [Clinically Stable] : Clinically stable [Continue Treatment Plan] : Continue treatment plan [0] : 0 out of 10 negative

## 2021-09-15 NOTE — PATIENT PROFILE ADULT - AD AGENT PHONE NUMBER
[phone] Glycopyrrolate Pregnancy And Lactation Text: This medication is Pregnancy Category B and is considered safe during pregnancy. It is unknown if it is excreted breast milk.

## 2021-10-14 NOTE — ED PROVIDER NOTE - CHIEF COMPLAINT
Progress Note( Dr. Mandeep Velez)  10/14/2021  Subjective:   Admit Date: 10/13/2021  PCP: Adrian Muñiz MD    Admitted For : Chest pain tautness of breath severe UTI    Consulted For: Control glucose    Interval History: Was transferred to medical floor seen by pulmonary    Denies any chest pains,   Denies SOB . Denies nausea or vomiting. No new bowel or bladder symptoms. No intake or output data in the 24 hours ending 10/14/21 0658    DATA    CBC: Recent Labs     10/12/21  2140 10/14/21  0301   WBC 10.9* 8.5   HGB 13.8 12.0*    319    CMP:  Recent Labs     10/12/21  2140 10/14/21  0301   * 137   K 3.3* 4.5   CL 98* 100   CO2 24 24   BUN 10 11   CREATININE 0.4* 0.5*   CALCIUM 9.4 9.6   PROT 7.8 6.6   LABALBU 4.7 4.2   BILITOT 0.2 0.3   ALKPHOS 91 87   AST 24 17   ALT 24 8*     Lipids:   Lab Results   Component Value Date    CHOL 99 04/27/2021    HDL 42 04/27/2021    TRIG 116 04/27/2021     Glucose:  Recent Labs     10/14/21  0003 10/14/21  0235 10/14/21  0636   POCGLU 258* 195* 167*     [id number]:  Lab Results   Component Value Date    LABA1C 7.8 10/13/2021     High Sensitivity TSH:   Lab Results   Component Value Date    TSHHS 2.000 09/30/2021     Free T3:   Lab Results   Component Value Date    FT3 2.2 07/15/2016     Free T4:  Lab Results   Component Value Date    T4FREE 1.36 07/13/2021       XR CHEST PORTABLE    Result Date: 10/12/2021  EXAMINATION: ONE XRAY VIEW OF THE CHEST 10/12/2021 7:18 pm     No acute cardiopulmonary abnormality.        Scheduled Medicines   Medications:    insulin lispro  10 Units SubCUTAneous TID WC    montelukast  10 mg Oral Nightly    aspirin  81 mg Oral Daily    carbidopa-levodopa  1 tablet Oral Nightly    pantoprazole  40 mg Oral Daily    docusate sodium  100 mg Oral BID    atorvastatin  20 mg Oral Daily    OXcarbazepine  450 mg Oral BID    busPIRone  20 mg Oral TID    levothyroxine  75 mcg Oral QAM AC    amitriptyline  25 mg Oral Nightly    magnesium The patient is a 63y Male complaining of oxide  400 mg Oral TID    losartan  100 mg Oral Daily    QUEtiapine  25 mg Oral BID    NIFEdipine  60 mg Oral Daily    metoprolol succinate  50 mg Oral Daily    baclofen  10 mg Oral BID    sodium chloride  1 g Oral BID WC    levETIRAcetam  750 mg Oral BID    sodium chloride flush  5-40 mL IntraVENous 2 times per day    enoxaparin  40 mg SubCUTAneous Daily    budesonide-formoterol  2 puff Inhalation BID    tiotropium  2 puff Inhalation Daily    cefTRIAXone (ROCEPHIN) IV  1,000 mg IntraVENous Q24H    clotrimazole   Vaginal BID    insulin glargine  15 Units SubCUTAneous Nightly    insulin lispro  0-6 Units SubCUTAneous TID WC    insulin lispro  0-3 Units SubCUTAneous 2 times per day      Infusions:    sodium chloride 25 mL (10/14/21 0242)    dextrose           Objective:   Vitals: /68   Pulse 73   Temp 98.5 °F (36.9 °C) (Oral)   Resp 16   Ht 5' 2\" (1.575 m)   Wt (S) 174 lb 4 oz (79 kg)   SpO2 98%   BMI 31.87 kg/m²   General appearance: alert and cooperative with exam  Neck: no JVD or bruit  Thyroid : Normal lobes   Lungs: Has Vesicular Breath sounds   Heart:  regular rate and rhythm  Abdomen: soft, non-tender; bowel sounds normal; no masses,  no organomegaly  Musculoskeletal: Normal  Extremities: extremities normal, , no edema  Neurologic:  Awake, alert, oriented to name, place and time. Cranial nerves II-XII are grossly intact. Motor is  intact.   Sensory i neuropathy t.,  and gait is normal.    Assessment:     Patient Active Problem List:     Chest pain     H/O Doppler ultrasound     H/O cardiovascular stress test     H/O cardiovascular stress test     H/O cardiovascular stress test     Incarcerated umbilical hernia     Essential hypertension     Type 2 diabetes mellitus with diabetic polyneuropathy, with long-term current use of insulin (HCC)     Tachycardia     Dyslipidemia     Family history of early CAD     NSTEMI (non-ST elevated myocardial infarction) ([facility] [address]     Abnormal nuclear stress test     ILSA (obstructive sleep apnea)     Snoring     Hypersomnolence     Mild intermittent asthma without complication     Asthma exacerbation attacks     Hypertensive emergency     Hallucination, visual     Severe episode of recurrent major depressive disorder, with psychotic features (HCC)     CHEKO (generalized anxiety disorder)     Auditory hallucinations     Bipolar 1 disorder, depressed, severe (HCC)     Dyspnea and respiratory abnormalities     Encephalopathy     Syncope     Bipolar 1 disorder (HCC)     Hyponatremia     Pseudoseizures (HCC)     Panic attacks     Generalized abdominal pain     Diabetes mellitus due to underlying condition with stage 2 chronic kidney disease, without long-term current use of insulin (HCC)     Seizure (HCC)     Amyloid polyneuropathy (HCC)     Anemia     Anxiety     Osteoarthrosis     CHF (congestive heart failure) (HCC)     Coronary atherosclerosis     Chronic pain     Degeneration of lumbar intervertebral disc     Disorder of liver     Dysuria     Fibrocystic breast     Gastroesophageal reflux disease     Gastroparesis     Gout     H/O degenerative disc disease     Hyperglycemia due to type 2 diabetes mellitus ([address]     Hypothyroidism due to acquired atrophy of thyroid     Irritable bowel syndrome     Low back pain     Memory loss     Migraine     Migraine without aura, not refractory     Hyperlipidemia     Neck pain     Neuropathy     Nonalcoholic steatohepatitis (URENA)     Osteoporosis     Polyneuropathy due to type 2 diabetes mellitus ([address]     Renal impairment     Suicidal ideation     Vitamin B12 deficiency      Plan:     1. Reviewed POC blood glucose . Labs and X ray results   2. Reviewed Current Medicines   3. On meal/ Correction bolus Humalog/ Basal Lantus Insulin regime  d  4. Monitor Blood glucose frequently   5. Modified  the dose of Insulin/ other medicines as needed   6. Will follow     .      Radu Daley MD, MD The patient is a 63y Male complaining of R foot swelling and pain

## 2021-10-21 NOTE — H&P ADULT - PROBLEM SELECTOR PLAN 7
Continue allopurinol.   Patient  takes colchicine PRN for gout flares. No joint pain to suggest active flare at this time.

## 2021-10-21 NOTE — CONSULT NOTE ADULT - SUBJECTIVE AND OBJECTIVE BOX
VASCULAR SURGERY CONSULT NOTE  --------------------------------------------------------------------------------------------  HPI: 65 y/o male with a past medical history of HTN, DMT2 c/b LLE neuropathy, CVA/TIA, Afib (on Pradaxa , MI, CAD x 3 stents, chronic unhealing RLE wound s/p angio, left CEA (2014) presents with dizziness. Patient reports 6 months of intermittent loss of balance and spacing out. Would occurs 1-2x a week. Denies fever, chills, headaches, focal weakness. Daughter at bedside denies facial droop.      ROS: 10-system review is otherwise negative except HPI above.      PAST MEDICAL & SURGICAL HISTORY:  HTN (hypertension), benign  HLD (hyperlipidemia)  DM type 2 (diabetes mellitus, type 2)  TIA (transient ischemic attack)  Atrial fibrillation  MI (myocardial infarction)  circa 2014  CAD (coronary artery disease)  Neuropathy  Status post angioplasty with stent  LULU x 3 2/7/2014  S/P carotid endarterectomy  left    FAMILY HISTORY:  Family history of heart disease    SOCIAL HISTORY:   Denies smoking, occasional EtOH use    ALLERGIES: No Known Allergies    HOME MEDICATIONS:      CURRENT MEDICATIONS  MEDICATIONS (STANDING):   MEDICATIONS (PRN):  --------------------------------------------------------------------------------------------    Vitals:   T(C): 36.4 (10-21-21 @ 16:32), Max: 36.6 (10-21-21 @ 12:00)  HR: 67 (10-21-21 @ 16:32) (62 - 76)  BP: 130/65 (10-21-21 @ 16:32) (116/69 - 147/84)  RR: 18 (10-21-21 @ 16:32) (18 - 18)  SpO2: 99% (10-21-21 @ 16:32) (96% - 99%)  CAPILLARY BLOOD GLUCOSE      POCT Blood Glucose.: 177 mg/dL (21 Oct 2021 16:02)      Height (cm): 180.3 (10-21 @ 12:00)  Weight (kg): 135.2 (10-21 @ 12:00)  BMI (kg/m2): 41.6 (10-21 @ 12:00)  BSA (m2): 2.5 (10-21 @ 12:00)    PHYSICAL EXAM:  General: NAD, Lying in bed comfortably  Neuro: Alert and answering questions appropriately, CN 2-12 intact  HEENT: Grossly normal, EOMI  Cardio: Right lower extremity edema  Resp: Good effort, no signs of respiratory distress  GI/Abd: Soft, NT/ND, no rebound/guarding, no masses palpated  Vascular: All 4 extremities warm, B/l radial pulses palpable, Chronic venous stasis over b/l LE  Musculoskeletal: All 4 extremities moving spontaneously, no limitations, Right anterior tibial wound.  --------------------------------------------------------------------------------------------    LABS  CBC (10-21 @ 17:00)                              15.0                           9.01    )----------------(  223        66.2  % Neutrophils, 21.8  % Lymphocytes, ANC: 5.96                                50.9<H>      --------------------------------------------------------------------------------------------    MICROBIOLOGY      --------------------------------------------------------------------------------------------  IMAGING   VASCULAR SURGERY CONSULT NOTE  --------------------------------------------------------------------------------------------  HPI: 65 y/o male with a past medical history of HTN, DMT2 c/b LLE neuropathy, CVA/TIA, Afib (on Pradaxa , MI, CAD x 3 stents, chronic unhealing RLE wound s/p angio, left CEA (2014) presents with dizziness. Patient reports 6 months of intermittent loss of balance and spacing out. Would occurs 1-2x a week. Denies fever, chills, headaches, focal weakness. Daughter at bedside denies facial droop.      ROS: 10-system review is otherwise negative except HPI above.      PAST MEDICAL & SURGICAL HISTORY:  HTN (hypertension), benign  HLD (hyperlipidemia)  DM type 2 (diabetes mellitus, type 2)  TIA (transient ischemic attack)  Atrial fibrillation  MI (myocardial infarction)  circa 2014  CAD (coronary artery disease)  Neuropathy  Status post angioplasty with stent  LULU x 3 2/7/2014  S/P carotid endarterectomy  left    FAMILY HISTORY:  Family history of heart disease    SOCIAL HISTORY:   Denies smoking, occasional EtOH use    ALLERGIES: No Known Allergies    HOME MEDICATIONS:      CURRENT MEDICATIONS  MEDICATIONS (STANDING):   MEDICATIONS (PRN):  --------------------------------------------------------------------------------------------    Vitals:   T(C): 36.4 (10-21-21 @ 16:32), Max: 36.6 (10-21-21 @ 12:00)  HR: 67 (10-21-21 @ 16:32) (62 - 76)  BP: 130/65 (10-21-21 @ 16:32) (116/69 - 147/84)  RR: 18 (10-21-21 @ 16:32) (18 - 18)  SpO2: 99% (10-21-21 @ 16:32) (96% - 99%)  CAPILLARY BLOOD GLUCOSE      POCT Blood Glucose.: 177 mg/dL (21 Oct 2021 16:02)      Height (cm): 180.3 (10-21 @ 12:00)  Weight (kg): 135.2 (10-21 @ 12:00)  BMI (kg/m2): 41.6 (10-21 @ 12:00)  BSA (m2): 2.5 (10-21 @ 12:00)    PHYSICAL EXAM:  General: NAD, Lying in bed comfortably  Neuro: Alert and answering questions appropriately, CN 2-12 intact  HEENT: Grossly normal, EOMI  Cardio: Right lower extremity edema  Resp: Good effort, no signs of respiratory distress  GI/Abd: Soft, NT/ND, no rebound/guarding, no masses palpated  Vascular: All 4 extremities warm, B/l radial pulses palpable, Chronic venous stasis over b/l LE  Musculoskeletal: All 4 extremities moving spontaneously, no limitations, Right anterior tibial wound.  --------------------------------------------------------------------------------------------    LABS  CBC (10-21 @ 17:00)                              15.0                           9.01    )----------------(  223        66.2  % Neutrophils, 21.8  % Lymphocytes, ANC: 5.96                                50.9<H>

## 2021-10-21 NOTE — H&P ADULT - NSHPLABSRESULTS_GEN_ALL_CORE
Labs, personally reviewed by me.     LABS:                        15.0   9.01  )-----------( 223      ( 21 Oct 2021 17:00 )             50.9     Hgb Trend: 15.0<--  10-21    137  |  94<L>  |  64<H>  ----------------------------<  204<H>  4.2   |  28  |  2.31<H>    Ca    9.4      21 Oct 2021 17:00    TPro  7.5  /  Alb  4.1  /  TBili  0.4  /  DBili  x   /  AST  16  /  ALT  8<L>  /  AlkPhos  104  10-21    Creatinine Trend: 2.31<--  PT/INR - ( 21 Oct 2021 17:00 )   PT: 18.9 sec;   INR: 1.61 ratio         PTT - ( 21 Oct 2021 17:00 )  PTT:78.9 sec    < from: CT Head No Cont (10.21.21 @ 17:18) >    FINDINGS:    No hydrocephalus, mass effect, midline shift, acute intracranial hemorrhage, or brainedema.    Mild to moderate white matter microvascular ischemic disease. Small chronic bilateral basal ganglia infarcts.    Visualized paranasal sinuses and mastoid air cells are clear.    IMPRESSION:    No hydrocephalus, acute intracranial hemorrhage, mass effect, or brain edema.      --- End of Report ---    < end of copied text > Labs, personally reviewed by me.     Hgb is WNL.   INR is elevated at 1.61. PT elevated at 18.9. PTT elevated 78.9.  CMP found elevated BUN of 64, SCr elevated at 2.31.  HS troponin T 79. ProBNP elevated 1935.      LABS:                        15.0   9.01  )-----------( 223      ( 21 Oct 2021 17:00 )             50.9     Hgb Trend: 15.0<--  10-21    137  |  94<L>  |  64<H>  ----------------------------<  204<H>  4.2   |  28  |  2.31<H>    Ca    9.4      21 Oct 2021 17:00    TPro  7.5  /  Alb  4.1  /  TBili  0.4  /  DBili  x   /  AST  16  /  ALT  8<L>  /  AlkPhos  104  10-21    Creatinine Trend: 2.31<--  PT/INR - ( 21 Oct 2021 17:00 )   PT: 18.9 sec;   INR: 1.61 ratio         PTT - ( 21 Oct 2021 17:00 )  PTT:78.9 sec    < from: CT Head No Cont (10.21.21 @ 17:18) >    FINDINGS:    No hydrocephalus, mass effect, midline shift, acute intracranial hemorrhage, or brainedema.    Mild to moderate white matter microvascular ischemic disease. Small chronic bilateral basal ganglia infarcts.    Visualized paranasal sinuses and mastoid air cells are clear.    IMPRESSION:    No hydrocephalus, acute intracranial hemorrhage, mass effect, or brain edema.      --- End of Report ---    < end of copied text >

## 2021-10-21 NOTE — H&P ADULT - NSHPPHYSICALEXAM_GEN_ALL_CORE
T(C): 36.3 (10-21-21 @ 19:10), Max: 36.6 (10-21-21 @ 12:00)  HR: 63 (10-21-21 @ 19:10) (62 - 76)  BP: 142/70 (10-21-21 @ 19:10) (116/69 - 147/84)  RR: 18 (10-21-21 @ 19:10) (18 - 18)  SpO2: 95% (10-21-21 @ 19:10) (95% - 99%)  Wt(kg): --    PHYSICAL EXAM:  GENERAL: NAD, well-groomed, well-developed  HEAD:  Atraumatic, Normocephalic  EYES: EOMI, PERRLA, conjunctiva and sclera clear  ENMT: No oropharyngeal exudates, erythema or lesions,  Moist mucous membranes, good dentition  NECK: Supple, no cervical lymphadenopathy, no JVD  NERVOUS SYSTEM:  Alert & Oriented X3, CN II-XII intact, 5/5 BUE and BLE motor strength, full sensation to light touch   CHEST/LUNG: Clear to auscultation bilaterally; No rales, no  rhonchi, no wheezing  HEART: Regular rate and rhythm; No murmurs, rubs, or gallops  ABDOMEN: Soft, Nontender, Nondistended  EXTREMITIES:  2+ radial Pulses, No cyanosis or edema  SKIN: warm, dry T(C): 36.3 (10-21-21 @ 19:10), Max: 36.6 (10-21-21 @ 12:00)  HR: 63 (10-21-21 @ 19:10) (62 - 76)  BP: 142/70 (10-21-21 @ 19:10) (116/69 - 147/84)  RR: 18 (10-21-21 @ 19:10) (18 - 18)  SpO2: 95% (10-21-21 @ 19:10) (95% - 99%)  Wt(kg): --    PHYSICAL EXAM:  GENERAL: NAD, well-groomed, well-developed  HEAD:  Atraumatic, Normocephalic  EYES: EOMI, PERRLA, conjunctiva and sclera clear  ENMT: No oropharyngeal exudates or erythema Moist mucous membranes  NECK: Supple, no cervical lymphadenopathy, L neck scar from CEA, + R neck audible bruit  NERVOUS SYSTEM:  Alert & Oriented X3, CN II-XII intact, 5/5 BUE and BLE motor strength, full sensation to light touch   CHEST/LUNG: crackles at bases bilaterally, no rhonchi  HEART: irregularly irregular rhythm; No murmurs  ABDOMEN: Soft, Nontender, Nondistended  EXTREMITIES:  2+ radial Pulses, No cyanosis or edema  SKIN: warm, dry T(C): 36.3 (10-21-21 @ 19:10), Max: 36.6 (10-21-21 @ 12:00)  HR: 63 (10-21-21 @ 19:10) (62 - 76)  BP: 142/70 (10-21-21 @ 19:10) (116/69 - 147/84)  RR: 18 (10-21-21 @ 19:10) (18 - 18)  SpO2: 95% (10-21-21 @ 19:10) (95% - 99%)  Wt(kg): --    PHYSICAL EXAM:  GENERAL: NAD, well-groomed, well-developed  HEAD:  Atraumatic, Normocephalic  EYES: EOMI, PERRLA, conjunctiva and sclera clear  ENMT: No oropharyngeal exudates or erythema Moist mucous membranes  NECK: Supple, no cervical lymphadenopathy, L neck scar from CEA, + R neck audible bruit  NERVOUS SYSTEM:  Alert & Oriented X3, CN II-XII intact, 5/5 BUE and BLE motor strength, full sensation to light touch   CHEST/LUNG: crackles at bases bilaterally, no rhonchi  HEART: irregularly irregular rhythm; No murmurs  ABDOMEN: Soft, Nontender, Nondistended  EXTREMITIES:  2+ radial Pulses, 1+ pitting edema BLE   SKIN: warm, chronic venous stasis changes of BLE, with weeping RLE wound currently covered with clean bandage. mild surrounding tenderness.

## 2021-10-21 NOTE — H&P ADULT - ASSESSMENT
This patient is a 65yo gentleman with PMH of CAD and NSTEMI s/p 3 stents in 2014 to LAD, proximal and distal LCx, ischemic cardiomyopathy, htn, T2DM c/b peripheral neuropathy, CVA/TIA, atrial fibrillation on pradaxa, chronic RLE wound, left carotid stenosis s/p endarterectomy, gout who presents to the ED with complaint of lightheadedness and dizziness. Episodes of dizziness and lightheadedness occur intermittently, typically after dinner while he is sitting. He notes feeling "zoned out" and unsteady, and has had 2 episodes of near falls.  He denies any vertiginous dizziness or LOC. He had an episode again this morning after breakfast. Patient reports symptoms are similar to when he required his L CEA, however not as severe. Patient has not checked fingerstick BS during these episodes. He sometimes sees "white stars" during the episodes. He reports BP is usually well controlled. He has chronic neuropathy affecting lower extremities to knees bilaterally, but no focal weakness. Patient denies any fever, chills, nausea, vomiting. He also denies any CP or palpitations. He is currently getting treated for chronic RLE wound which has been improving under the management of wound  care doctor.

## 2021-10-21 NOTE — ED ADULT NURSE NOTE - CHPI ED NUR SYMPTOMS NEG
no chest pain/no chills/no congestion/no fever/no nausea/no shortness of breath/no vomiting no back pain/no chest pain/no chills/no congestion/no fever/no nausea/no shortness of breath/no vomiting

## 2021-10-21 NOTE — ED PROVIDER NOTE - ATTENDING CONTRIBUTION TO CARE
Attending MD David:  I personally have seen and examined this patient.  Resident note reviewed and agree on plan of care and except where noted.  See HPI, PE, and MDM for details.

## 2021-10-21 NOTE — CONSULT NOTE ADULT - SUBJECTIVE AND OBJECTIVE BOX
Cardiovascular Disease Initial Evaluation: Covering for Dr. Mazariegos    CHIEF COMPLAINT: Dizziness    HISTORY OF PRESENT ILLNESS: Mr. Stevens is a 65 y/o male with a past medical history of HTN, DMT2 c/b LLE neuropathy, CVA/TIA, Afib (on Pradaxa , MI, CAD (NSTEMI) x 3 stents in 2014 to LAD, and proximal and distal LCx, ischemic cardiomyopathy, chronic unhealing RLE wound s/p angio, left CEA (2014) presents with dizziness. Patient reports 6 months of intermittent loss of balance and spacing out. Would occurs 1-2x a week. Denies fever, chills, headaches, focal weakness. Daughter at bedside denies facial droop. Pt denies chest pain or palpitations.     Of note pt was noted to have progressive worsening R sided carotid disease, from outside studies with moderate to severe disease.     Home medications include: Allopurinol 100 mg daily, Lyrica 100mg TID, Acetaminophen-oxycodone 325-5mg prn, DIltiazem 120mg daily, Novolog, Toprol XL 50mg daily, Metolazone 5mg M,W,F, Bumex 2mg daily, Pradaxa 150mg  q12h, Simvastatin, ASA      Allergies    No Known Allergies    Intolerances    	    MEDICATIONS:                  PAST MEDICAL & SURGICAL HISTORY:  HTN (hypertension), benign    HLD (hyperlipidemia)    DM type 2 (diabetes mellitus, type 2)    TIA (transient ischemic attack)    Atrial fibrillation    MI (myocardial infarction)  circa 2014    CAD (coronary artery disease)    Neuropathy    Status post angioplasty with stent  LULU x 3 2/7/2014    S/P carotid endarterectomy  left        FAMILY HISTORY:  Family history of heart disease        SOCIAL HISTORY:    The patient is a nonsmoker       REVIEW OF SYSTEMS:  See HPI, otherwise complete 14 point review of systems negative    [x ] All others negative	  [ ] Unable to obtain    PHYSICAL EXAM:  T(C): 36.3 (10-21-21 @ 19:10), Max: 36.6 (10-21-21 @ 12:00)  HR: 63 (10-21-21 @ 19:10) (62 - 76)  BP: 142/70 (10-21-21 @ 19:10) (116/69 - 147/84)  RR: 18 (10-21-21 @ 19:10) (18 - 18)  SpO2: 95% (10-21-21 @ 19:10) (95% - 99%)  Wt(kg): --  I&O's Summary      Appearance: No Acute Distress; resting comfortably  HEENT:  Normal oral mucosa, PERRL, EOMI	  Cardiovascular: Normal S1 S2, No JVD, No murmurs/rubs/gallops  Respiratory: Normal respiratory effort; Lungs clear to auscultation bilaterally  Gastrointestinal:  Soft, Non-tender, + BS	  Skin: No rashes, No ecchymoses, No cyanosis	  Neurologic: Non-focal; no weakness  Extremities: No clubbing, cyanosis or edema  Vascular: Peripheral pulses palpable 2+ bilaterally  Psychiatry: A & O x 3, Mood & affect appropriate    Laboratory Data:	 	    CBC Full  -  ( 21 Oct 2021 17:00 )  WBC Count : 9.01 K/uL  Hemoglobin : 15.0 g/dL  Hematocrit : 50.9 %  Platelet Count - Automated : 223 K/uL  Mean Cell Volume : 84.1 fl  Mean Cell Hemoglobin : 24.8 pg  Mean Cell Hemoglobin Concentration : 29.5 gm/dL  Auto Neutrophil # : 5.96 K/uL  Auto Lymphocyte # : 1.96 K/uL  Auto Monocyte # : 0.89 K/uL  Auto Eosinophil # : 0.13 K/uL  Auto Basophil # : 0.04 K/uL  Auto Neutrophil % : 66.2 %  Auto Lymphocyte % : 21.8 %  Auto Monocyte % : 9.9 %  Auto Eosinophil % : 1.4 %  Auto Basophil % : 0.4 %    10-21    137  |  94<L>  |  64<H>  ----------------------------<  204<H>  4.2   |  28  |  2.31<H>    Ca    9.4      21 Oct 2021 17:00    TPro  7.5  /  Alb  4.1  /  TBili  0.4  /  DBili  x   /  AST  16  /  ALT  8<L>  /  AlkPhos  104  10-21      proBNP: Serum Pro-Brain Natriuretic Peptide: 1935 pg/mL (10-21 @ 17:00)        CARDIAC MARKERS: Trop T: 79            Interpretation of Telemetry: N/a	    ECG: Afib 69bpm, essentially unchanged from previous studies.   RADIOLOGY: N/a  OTHER: CT head: negative for intracranial bleeding. 	    PREVIOUS DIAGNOSTIC TESTING:    [x ] Echocardiogram: 10/2019 LVEF 45% minimal MR, borderline LV dysfunction  [x ] Catheterization: 2014: PCI to LAD and LXCX(x2)  [ ] Stress Test:  	    Assessment:  65 y/o male with a past medical history of HTN, DMT2 c/b LLE neuropathy, CVA/TIA, Afib (on Pradaxa , MI, CAD (NSTEMI) x 3 stents in 2014 to LAD, and proximal and distal LCx, ischemic cardiomyopathy, chronic unhealing RLE wound s/p angio, left CEA (2014) presents with dizziness    Plan of Care:    #R carotid stenosis  Dizziness may be 2/2 to worsening carotid disease  Obtain carotid dopplers  Vascular input appreciated  Continue ASA and Statin Therapy    #CAD  S/p PCI to LAD and LCX in 2014  TTE 2019 shows mildly reduced LVEF 45%  Continue BB, Asa, and Statin therapy  Trop T elevated in setting of CKD  EKG shows afib. No active ischemia noted.     #Ischemic cardiomyopathy  Pt euvolemic on exam  Continue BB, ASA, Statin  Bumex and Metolazone M,W,F    #HTN  BP acceptable  Continue     #DM2  ISS  Management as per primary team    #CKD  Management as per primary team    #Afib  Continue Pradaxa and BB       #ACP (advance care planning)-  Advanced care planning was discussed with the patient.  At this time, will continue current cardiac management. No further ischemic evaluation is warranted at this time. Awaiting vascular studies.  Risks, benefits and alternatives of medical treatment and procedures were discussed in detail and all questions were answered. 30 minutes spent addressing advance care plans.              72 minutes spent on total encounter; more than 50% of the visit was spent counseling and/or coordinating care by the attending physician.   	  Fredi Wesley D.O.   Cardiovascular Diseases  [phone]     Cardiovascular Disease Initial Evaluation: Covering for Dr. Mazariegos    CHIEF COMPLAINT: Dizziness    HISTORY OF PRESENT ILLNESS: Mr. Stevens is a 65 y/o male with a past medical history of HTN, DMT2 c/b LLE neuropathy, CVA/TIA, Afib (on Pradaxa , MI, CAD (NSTEMI) x 3 stents in 2014 to LAD, and proximal and distal LCx, ischemic cardiomyopathy, chronic unhealing RLE wound s/p angio, left CEA (2014) presents with dizziness. Patient reports 6 months of intermittent loss of balance and spacing out. Would occurs 1-2x a week. Denies fever, chills, headaches, focal weakness. Daughter at bedside denies facial droop. Pt denies chest pain or palpitations.     Of note pt was noted to have progressive worsening R sided carotid disease, from outside studies with moderate to severe disease.     Home medications include: Allopurinol 100 mg daily, Lyrica 100mg TID, Acetaminophen-oxycodone 325-5mg prn, DIltiazem 120mg daily, Novolog, Toprol XL 50mg daily, Metolazone 5mg M,W,F, Bumex 2mg daily, Pradaxa 150mg  q12h, Simvastatin, ASA      Allergies    No Known Allergies    Intolerances    	    MEDICATIONS:                  PAST MEDICAL & SURGICAL HISTORY:  HTN (hypertension), benign    HLD (hyperlipidemia)    DM type 2 (diabetes mellitus, type 2)    TIA (transient ischemic attack)    Atrial fibrillation    MI (myocardial infarction)  circa 2014    CAD (coronary artery disease)    Neuropathy    Status post angioplasty with stent  LULU x 3 2/7/2014    S/P carotid endarterectomy  left        FAMILY HISTORY:  Family history of heart disease        SOCIAL HISTORY:    The patient is a nonsmoker       REVIEW OF SYSTEMS:  See HPI, otherwise complete 14 point review of systems negative    [x ] All others negative	  [ ] Unable to obtain    PHYSICAL EXAM:  T(C): 36.3 (10-21-21 @ 19:10), Max: 36.6 (10-21-21 @ 12:00)  HR: 63 (10-21-21 @ 19:10) (62 - 76)  BP: 142/70 (10-21-21 @ 19:10) (116/69 - 147/84)  RR: 18 (10-21-21 @ 19:10) (18 - 18)  SpO2: 95% (10-21-21 @ 19:10) (95% - 99%)  Wt(kg): --  I&O's Summary      Appearance: No Acute Distress; resting comfortably  HEENT:  Normal oral mucosa, PERRL, EOMI	  Cardiovascular: Normal S1 S2, No JVD, No murmurs/rubs/gallops  Respiratory: Normal respiratory effort; Lungs clear to auscultation bilaterally  Gastrointestinal:  Soft, Non-tender, + BS	  Skin: No rashes, No ecchymoses, No cyanosis	  Neurologic: Non-focal; no weakness  Extremities: No clubbing, cyanosis or edema  Vascular: Peripheral pulses palpable 2+ bilaterally  Psychiatry: A & O x 3, Mood & affect appropriate    Laboratory Data:	 	    CBC Full  -  ( 21 Oct 2021 17:00 )  WBC Count : 9.01 K/uL  Hemoglobin : 15.0 g/dL  Hematocrit : 50.9 %  Platelet Count - Automated : 223 K/uL  Mean Cell Volume : 84.1 fl  Mean Cell Hemoglobin : 24.8 pg  Mean Cell Hemoglobin Concentration : 29.5 gm/dL  Auto Neutrophil # : 5.96 K/uL  Auto Lymphocyte # : 1.96 K/uL  Auto Monocyte # : 0.89 K/uL  Auto Eosinophil # : 0.13 K/uL  Auto Basophil # : 0.04 K/uL  Auto Neutrophil % : 66.2 %  Auto Lymphocyte % : 21.8 %  Auto Monocyte % : 9.9 %  Auto Eosinophil % : 1.4 %  Auto Basophil % : 0.4 %    10-21    137  |  94<L>  |  64<H>  ----------------------------<  204<H>  4.2   |  28  |  2.31<H>    Ca    9.4      21 Oct 2021 17:00    TPro  7.5  /  Alb  4.1  /  TBili  0.4  /  DBili  x   /  AST  16  /  ALT  8<L>  /  AlkPhos  104  10-21      proBNP: Serum Pro-Brain Natriuretic Peptide: 1935 pg/mL (10-21 @ 17:00)        CARDIAC MARKERS: Trop T: 79            Interpretation of Telemetry: N/a	    ECG: Afib 69bpm, essentially unchanged from previous studies.   RADIOLOGY: N/a  OTHER: CT head: negative for intracranial bleeding. 	    PREVIOUS DIAGNOSTIC TESTING:    [x ] Echocardiogram: 10/2019 LVEF 45% minimal MR, borderline LV dysfunction  [x ] Catheterization: 2014: PCI to LAD and LXCX(x2)  [ ] Stress Test:  	    Assessment:  65 y/o male with a past medical history of HTN, DMT2 c/b LLE neuropathy, CVA/TIA, Afib (on Pradaxa , MI, CAD (NSTEMI) x 3 stents in 2014 to LAD, and proximal and distal LCx, ischemic cardiomyopathy, chronic unhealing RLE wound s/p angio, left CEA (2014) presents with dizziness    Plan of Care:    #R carotid stenosis  Dizziness may be 2/2 to worsening carotid disease  Obtain carotid dopplers  Vascular input appreciated  Continue ASA and Statin Therapy    #CAD  S/p PCI to LAD and LCX in 2014  TTE 2019 shows mildly reduced LVEF 45%  Continue BB, Asa, and Statin therapy  Trop T elevated in setting of CKD  EKG shows afib. No active ischemia noted.     #Ischemic cardiomyopathy  Pt euvolemic on exam  Continue BB, ASA, Statin  Bumex and Metolazone M,W,F    #HTN  BP acceptable  Continue     #DM2  ISS  Management as per primary team    #CKD  Management as per primary team    #Afib  Pt on Pradaxa at home  Will transition to heparin infusion while inpatient.       #ACP (advance care planning)-  Advanced care planning was discussed with the patient.  At this time, will continue current cardiac management. No further ischemic evaluation is warranted at this time. Awaiting vascular studies.  Risks, benefits and alternatives of medical treatment and procedures were discussed in detail and all questions were answered. 30 minutes spent addressing advance care plans.              72 minutes spent on total encounter; more than 50% of the visit was spent counseling and/or coordinating care by the attending physician.   	  Fredi Wesley D.O.   Cardiovascular Diseases  [phone]

## 2021-10-21 NOTE — H&P ADULT - NSHPADDITIONALINFOADULT_GEN_ALL_CORE
Patient assigned to me by night hospitalist in charge for management and care for patient for this evening only. Care to be continued by day hospitalist in the morning and thereafter.  Lynette Stockton MD [phone]

## 2021-10-21 NOTE — H&P ADULT - NSHPSOCIALHISTORY_GEN_ALL_CORE
The patient lives with his wife and daughter.  He uses a cane and sometimes a walker to ambulate.  He denies tobacco use. He drinks alcohol rarely.

## 2021-10-21 NOTE — H&P ADULT - HISTORY OF PRESENT ILLNESS
This patient is a 67yo gentleman with PMH of CAD and NSTEMI s/p 3 stents in 2014 to LAD, proximal and distal LCx, ischemic cardiomyopathy, htn, T2DM c/b peripheral neuropathy, CVA/TIA, atrial fibrillation on pradaxa, chronic RLE wound, left carotid stenosis s/p endarterectomy, gout who presents to the ED with complaint of lightheadedness and dizziness for the last 6 months.  This patient is a 67yo gentleman with PMH of CAD and NSTEMI s/p 3 stents in 2014 to LAD, proximal and distal LCx, ischemic cardiomyopathy, htn, T2DM c/b peripheral neuropathy, CVA/TIA, atrial fibrillation on pradaxa, chronic RLE wound, left carotid stenosis s/p endarterectomy, gout who presents to the ED with complaint of lightheadedness and dizziness. Episodes of dizziness and lightheadedness occur intermittently, typically after dinner while he is sitting. He notes feeling "zoned out" and unsteady, and has had 2 episodes of near falls.  He denies any vertiginous dizziness or LOC. He had an episode again this morning after breakfast. Patient reports symptoms are similar to when he required his L CEA, however not as severe. Patient has not checked fingerstick BS during these episodes. He sometimes sees "white stars" during the episodes. He reports BP is usually well controlled. He has chronic neuropathy affecting lower extremities to knees bilaterally, but no focal weakness. Patient denies any fever, chills, nausea, vomiting. He also denies any CP or palpitations. He is currently getting treated for chronic RLE wound which has been improving under the management of wound  care doctor.

## 2021-10-21 NOTE — H&P ADULT - NSICDXFAMILYHX_GEN_ALL_CORE_FT
FAMILY HISTORY:  Family history of heart disease     FAMILY HISTORY:  Family history of CVA, mother  Family history of heart disease, father

## 2021-10-21 NOTE — ED PROVIDER NOTE - PROGRESS NOTE DETAILS
Sujit Lee, PGY-2- Received call from Dr. Mazariegos's office, requesting patient to be admitted to medicine (Dr. SABI Brooks). Requesting pt to get a CTA head/neck, pending pt Creatinine. Concern for carotid stenosis. Suspect pt to have elevated creatinine here today as his baseline Creatinine is around 2. Will obtain CT head non contrast, and pt will need MRI inpatient.

## 2021-10-21 NOTE — ED PROVIDER NOTE - CLINICAL SUMMARY MEDICAL DECISION MAKING FREE TEXT BOX
Attending MD David: 66M with ho afib on Pradaxa, PVD, CVA presenting with months of intermittent dizziness, not really described as vertigo, also associated near syncope. Referred to ED today for reportedly outpatient carotid duplex with 75% stenosis on right (h/o CEA on left). Examination is normal, no focal neurologic findings, uncertain if this finding explains acute on chronic complaints. ECG shows afib in 60s, no acute ischemic changes. Plan for labs, tele, CT head screening for subacute CVAs, admission for further work up. Outpatient NP requested CTA, however review of records shows CKD with GFR around 30 so would not given IV contrast to this patient. Consider inpatient MR angiogram noncontrast instead.       *The above represents an initial assessment/impression. Please refer to progress notes for potential changes in patient clinical course*

## 2021-10-21 NOTE — H&P ADULT - PROBLEM SELECTOR PLAN 4
HR is currently well controlled.  Pradaxa held and on heparin gtt currently.  Continue toprol 50mg BID. Continue diltiazem 120mg PO qdaily.

## 2021-10-21 NOTE — CONSULT NOTE ADULT - ASSESSMENT
65 y/o male with a past medical history of HTN, DMT2 c/b LLE neuropathy, CVA/TIA, Afib (on Pradaxa , MI, CAD x 3 stents, chronic unhealing RLE wound s/p angio, left CEA (2014) presents with dizziness. outpatient with high grade stenosis of the right carotid artery.     PLAN:   -   -   -   -   - Patient seen/examined or Plan Discussed with Fellow,    - Plan to be discussed with Attending, Dr. Pinto   65 y/o male with a past medical history of HTN, DMT2 c/b LLE neuropathy, CVA/TIA, Afib (on Pradaxa , MI, CAD x 3 stents, chronic unhealing RLE wound s/p angio, left CEA (2014) presents with dizziness. outpatient with high grade stenosis of the right carotid artery.     PLAN:   - No surgical intervention acutely, given patient is asymptomatic   - Please obtained Carotid duplex to r/o stenosis causing TIA/strokes  - Plan discussed with Attending, Dr. Blair Hancock MD, PGY 3  Vascular Surgery  [id number]

## 2021-10-21 NOTE — ED PROVIDER NOTE - NS ED ROS FT
General: no fever, no chills  Eyes: no vision changes, no eye pain  Cardiovascular: no chest pain, no edema  Respiratory: no cough, +shortness of breath  Gastrointestinal: no abdominal pain, no nausea, no vomiting, no diarrhea  Genitourinary: no dysuria, no hematuria  Musculoskeletal: no muscle pain, no joint pain  Skin: no rash, +wound  Neuro: no numbness, no tingling, +lightheaded, +HA  Psych: no depression, no anxiety

## 2021-10-21 NOTE — H&P ADULT - PROBLEM SELECTOR PLAN 6
Continue home dose of aspirin, statin, toprol.   The patient has CKD stage 3 and is not on an ACEI/ARB.   He denies any chest pain, with elevated troponin with CKD 3.

## 2021-10-21 NOTE — H&P ADULT - PROBLEM SELECTOR PLAN 1
Differential diagnosis for episodic dizziness and lightheadedness for this patient includes but is not limited to symptomatic carotid stenosis, orthostatic hypotension, arrythmia, hypo/hyperglycemia. CTH did not show acute findings.   Check post-prandial fingersticks.  Check orthostatic BP.  Monitor on telemetry.   Check carotid dopplers.  Vascular surgery noted- no plan for acute surgical intervention. Will aim to obtain MRI to assess stenosis. Differential diagnosis for episodic dizziness and lightheadedness for this patient includes but is not limited to symptomatic carotid stenosis, orthostatic hypotension, arrythmia, hypo/hyperglycemia. CTH did not show acute findings.   Check post-prandial fingersticks.  Check orthostatic BP.  Monitor on telemetry.   Check carotid duplex US. If positive, may need MR angio of neck (likely noncontrast due to CKD- day team to discuss with radiology).  Vascular surgery noted- no plan for acute surgical intervention.

## 2021-10-21 NOTE — ED PROVIDER NOTE - OBJECTIVE STATEMENT
66 year old male with a pmhx of HTN, DM, TIA, afib, on Pradaxa, L carotid endarterectomy, is sent to ED from Dr. Mazariegos's office for evaluation of lightheadedness. Per pt, he has been intermittently lightheaded x6m. Pt reports symptoms began last night and have persisted longer than usual. Has slight headache. Pt states symptoms feel similar to previous when he needed the CEA on the left. Pt reports having US of rt carotid showing 75% occlusion throughout. Also notes a wound on the rt shin that has been cared for by wound care. Short of breat at baseline. No fever, chills, cp, cough, abd pain, vomiting, diarrhea, dysuria. No recent falls. 66 year old male with a pmhx of HTN, DM, TIA, afib, on Pradaxa, L carotid endarterectomy, MI, CAD s/p 3 stents, is sent to ED from Dr. Mazariegos's office for evaluation of lightheadedness. Per pt, he has been intermittently lightheaded x6m. Pt reports symptoms began last night and have persisted longer than usual. Has slight headache. Pt states symptoms feel similar to previous when he needed the CEA on the left. Pt reports having US of rt carotid showing 75% occlusion throughout. Also notes a wound on the rt shin that has been cared for by wound care. Short of breat at baseline. No fever, chills, cp, cough, abd pain, vomiting, diarrhea, dysuria. No recent falls.

## 2021-10-21 NOTE — ED ADULT NURSE NOTE - OBJECTIVE STATEMENT
66y male PMH CAD, MI 7yo ago w 66y male PMH CAD, MI 8yo ago w stent placement, Afib, HTN, DM, gout presenting to ED from home. C/o of lightheadedness dizziness, and "dazing off" episodically for 6 months but worsening last night and continuing today. Pt reports two episodes of near falling last night.  Pt denies CP, SOB, palpitations, numbness/tingling unrelated to neuropathy, fever, chills, changes in bowel or urinary habits. Pt has an open wound right lower extremity which he endorses being treated by his physician. A&Ox3. Safety and comfort maintained bed in lowest position, daughter present at the bedside. 66y male PMH CAD, MI 8yo ago w stent placement, Afib, HTN, DM, gout presenting to ED from home. C/o of lightheadedness dizziness, and "dazing off" episodically for 6 months but worsening last night and continuing today. Pt reports two episodes of near falling last night.  Pt denies CP, SOB, palpitations, numbness/tingling unrelated to neuropathy, fever, chills, changes in bowel or urinary habits. Pt has an open wound right lower extremity which he endorses being treated by his physician. Gross neuro intact, VSS, A&Ox3. Safety and comfort maintained bed in lowest position, daughter present at the bedside. 66y male PMH CAD, MI 7yo ago w stent placement, stroke, kidney impairment, Afib, HTN, DM, gout, neuropathy presenting to ED from home. C/o of lightheadedness dizziness, blurred vision episodically for 6 months but worsening last night and continuing today. Pt reports two episodes of near falling last night and "dazing off."  Pt denies CP, SOB, palpitations, numbness/tingling unrelated to neuropathy, fever, chills, changes in bowel or urinary habits. Pt has an open wound right lower extremity which he endorses being treated by his wound care physician. Gross neuro intact, VSS, A&Ox3. Safety and comfort maintained bed in lowest position, daughter present at the bedside. 66y male PMH CAD, MI 8yo ago w stent placement, stroke, kidney impairment, Afib, HTN, DM, gout, neuropathy presenting to ED from home. C/o of lightheadedness dizziness, blurred vision episodically for 6 months but worsening last night and continuing today. Pt reports two episodes of near falling last night and "dazing off."  Pt denies CP, SOB, palpitations, numbness/tingling unrelated to neuropathy, fever, chills, changes in bowel or urinary habits. Pt has an open wound right lower extremity which he endorses being treated by his wound care physician. Pt placed on CM, EKG performed. Gross neuro intact, VSS, A&Ox3. Safety and comfort maintained bed in lowest position, daughter present at the bedside. 66y male PMH CAD, MI 10yo ago w stent placement, stroke, kidney impairment, Afib, HTN, DM type II, gout, neuropathy presenting to ED from home. C/o of lightheadedness dizziness, blurred vision episodically for 6 months but worsening last night and continuing today. Pt reports two episodes of near falling last night and "dazing off."  Pt denies CP, SOB, palpitations, numbness/tingling unrelated to neuropathy, fever, chills, changes in bowel or urinary habits. Pt has an open wound right lower extremity which he endorses being treated by his wound care physician. Pt placed on CM, EKG performed. Gross neuro intact, VSS, A&Ox3. Safety and comfort maintained bed in lowest position, daughter present at the bedside.

## 2021-10-21 NOTE — H&P ADULT - PROBLEM SELECTOR PLAN 2
Per cardiology note- pt had TTE in 2019 which found mildly reduced LVEF 45%    Continue aspirin, statin, toprol.  Per cardio  Patient has pulmonary crackles at bases and lower extremity edema. Will cotinue bumex 2mg qhs and metolazone 5mg 3 x weekly.  Monitor BMP and replete electrolytes PRN.

## 2021-10-21 NOTE — ED PROVIDER NOTE - PHYSICAL EXAMINATION
General: well appearing, no acute distress, AOx3  Skin: no rash, no pallor, open, weeping serous fluid wound at anterior shin, no surrounding erythema   Head: normocephalic, atraumatic  Eyes: clear conjunctiva, EOMI  ENMT: airway patent, no nasal discharge  Cardiovascular: irregularly irregular, S1/S2, 2+ DP pulses b/l, edema b/l   Pulmonary: clear to auscultation bilaterally, no rales, rhonchi, or wheeze  Abdomen: soft, nontender  Musculoskeletal: moving extremities well, no deformity  Psych: normal mood, normal affect

## 2021-10-21 NOTE — H&P ADULT - PROBLEM SELECTOR PLAN 5
Start lantus 25units qhs and premeal novolog 10units TID.  Continue sliding scale humalog premeal and qhs.  Check A1C.  Monitor fingerstick BS TID and qhs.

## 2021-10-21 NOTE — H&P ADULT - NSHPREVIEWOFSYSTEMS_GEN_ALL_CORE
REVIEW OF SYSTEMS  CONSTITUTIONAL: No fever, no chills, no fatigue  EYES: No eye pain, no vision changes  ENMT:  No difficulty hearing, no throat pain  RESPIRATORY: No cough, no wheezing, no sputum production; No shortness of breath  CARDIOVASCULAR: No chest pain, no palpitations, no FAYE, no leg swelling  GASTROINTESTINAL: No abdominal pain, no nausea, no vomiting, no hematemesis, no diarrhea, no constipation, no melena, no hematochezia.  GENITOURINARY: No dysuria, no hematuria  NEUROLOGICAL: No headaches, no loss of strength, no numbness  SKIN: No itching, no rashes, no lesions   MUSCULOSKELETAL: No joint pain, no joint swelling; No muscle pain  HEME/LYMPH: No easy bruising, bleeding REVIEW OF SYSTEMS  CONSTITUTIONAL: No fever, no chills  EYES: No eye pain, + sees stars during episodes of dizziness  ENMT:  No difficulty hearing, no throat pain  RESPIRATORY: mild cough, clear sputum production; mild shortness of breath  CARDIOVASCULAR: No chest pain, no palpitations, chronic leg swelling  GASTROINTESTINAL: No abdominal pain, no nausea, no vomiting, + constipation  GENITOURINARY: No dysuria, no hematuria  NEUROLOGICAL: No headaches, +neuropathy, + dizziness and lightheadedness  SKIN: No itching, no rashes, no lesions   MUSCULOSKELETAL: No joint pain, no joint swelling; No muscle pain  HEME/LYMPH: No easy bruising, bleeding

## 2021-10-21 NOTE — ED ADULT NURSE REASSESSMENT NOTE - NS ED NURSE REASSESS COMMENT FT1
Pt asking to eat. As per conversation with MD pt can have food. Pt and daughter made aware, daughter providing food.

## 2021-10-21 NOTE — ED ADULT NURSE NOTE - NSIMPLEMENTINTERV_GEN_ALL_ED
Implemented All Fall Risk Interventions:  [place] to call system. Call bell, personal items and telephone within reach. Instruct patient to call for assistance. Room bathroom lighting operational. Non-slip footwear when patient is off stretcher. Physically safe environment: no spills, clutter or unnecessary equipment. Stretcher in lowest position, wheels locked, appropriate side rails in place. Provide visual cue, wrist band, yellow gown, etc. Monitor gait and stability. Monitor for mental status changes and reorient to person, place, and time. Review medications for side effects contributing to fall risk. Reinforce activity limits and safety measures with patient and family.

## 2021-10-21 NOTE — H&P ADULT - PROBLEM SELECTOR PLAN 3
Avoid nephrotoxic agents. Renally dose medications.   SCr appears fairly stable- was about 2.3 in Aug 2020.

## 2021-10-22 NOTE — PROGRESS NOTE ADULT - ASSESSMENT
67 y/o male with a past medical history of HTN, DMT2 c/b LLE neuropathy, CVA/TIA, Afib (on Pradaxa , MI, CAD x 3 stents, chronic unhealing RLE wound s/p angio, left CEA (2014) presents with dizziness. outpatient with high grade stenosis of the right carotid artery.     PLAN:   - stable from vasc surg standpoint  await  carotid duplex s/o carotid stenosis    recommend CTA of neck to eval carotid stenosis   recommend neurology  eval  for dizziness  will follow

## 2021-10-22 NOTE — PHYSICAL THERAPY INITIAL EVALUATION ADULT - ADDITIONAL COMMENTS
Prior to admission pt reports being independent of all ADL's & functional mobility with SAC. Pt resides in house with spouse, 3 steps to enter, pt resides on first floor.

## 2021-10-22 NOTE — PROGRESS NOTE ADULT - SUBJECTIVE AND OBJECTIVE BOX
Patient is a 66y old  Male who presents with a chief complaint of dizziness and lightheadedness (22 Oct 2021 09:28)      SUBJECTIVE / OVERNIGHT EVENTS:    Events noted.  CONSTITUTIONAL: No fever,  or fatigue  RESPIRATORY: No cough, wheezing,  No shortness of breath  CARDIOVASCULAR: No chest pain, palpitations, dizziness, or leg swelling  GASTROINTESTINAL: No abdominal or epigastric pain. No nausea, vomiting.  NEUROLOGICAL: No headaches,     MEDICATIONS  (STANDING):  allopurinol 200 milliGRAM(s) Oral daily  aspirin  chewable 81 milliGRAM(s) Oral daily  buMETAnide 2 milliGRAM(s) Oral daily  dextrose 40% Gel 15 Gram(s) Oral once  dextrose 5%. 1000 milliLiter(s) (50 mL/Hr) IV Continuous <Continuous>  dextrose 5%. 1000 milliLiter(s) (100 mL/Hr) IV Continuous <Continuous>  dextrose 50% Injectable 25 Gram(s) IV Push once  dextrose 50% Injectable 12.5 Gram(s) IV Push once  dextrose 50% Injectable 25 Gram(s) IV Push once  diltiazem    milliGRAM(s) Oral daily  glucagon  Injectable 1 milliGRAM(s) IntraMuscular once  heparin  Infusion. 2000 Unit(s)/Hr (20 mL/Hr) IV Continuous <Continuous>  insulin glargine Injectable (LANTUS) 25 Unit(s) SubCutaneous at bedtime  insulin lispro (ADMELOG) corrective regimen sliding scale   SubCutaneous three times a day before meals  insulin lispro (ADMELOG) corrective regimen sliding scale   SubCutaneous at bedtime  insulin lispro Injectable (ADMELOG) 10 Unit(s) SubCutaneous three times a day before meals  metolazone 5 milliGRAM(s) Oral <User Schedule>  metoprolol succinate ER 50 milliGRAM(s) Oral two times a day  oxycodone    5 mG/acetaminophen 325 mG 1 Tablet(s) Oral every 12 hours  pregabalin 100 milliGRAM(s) Oral three times a day    MEDICATIONS  (PRN):  acetaminophen     Tablet .. 650 milliGRAM(s) Oral every 6 hours PRN Temp greater or equal to 38C (100.4F), Mild Pain (1 - 3)  heparin   Injectable 03915 Unit(s) IV Push every 6 hours PRN For aPTT less than 40  heparin   Injectable 5000 Unit(s) IV Push every 6 hours PRN For aPTT between 40 - 57        CAPILLARY BLOOD GLUCOSE      POCT Blood Glucose.: 180 mg/dL (22 Oct 2021 21:56)  POCT Blood Glucose.: 101 mg/dL (22 Oct 2021 18:28)  POCT Blood Glucose.: 195 mg/dL (22 Oct 2021 12:25)  POCT Blood Glucose.: 197 mg/dL (22 Oct 2021 08:43)  POCT Blood Glucose.: 183 mg/dL (22 Oct 2021 01:43)    I&O's Summary    21 Oct 2021 07:01  -  22 Oct 2021 07:00  --------------------------------------------------------  IN: 0 mL / OUT: 500 mL / NET: -500 mL    22 Oct 2021 07:01  -  23 Oct 2021 00:35  --------------------------------------------------------  IN: 266 mL / OUT: 400 mL / NET: -134 mL        T(C): 36.9 (10-22-21 @ 20:54), Max: 36.9 (10-22-21 @ 20:54)  HR: 85 (10-22-21 @ 20:54) (73 - 98)  BP: 161/83 (10-22-21 @ 20:54) (94/58 - 161/83)  RR: 18 (10-22-21 @ 20:54) (18 - 20)  SpO2: 94% (10-22-21 @ 20:54) (94% - 97%)    PHYSICAL EXAM:  GENERAL: NAD  NECK: Supple, No JVD  CHEST/LUNG: Clear to auscultation bilaterally; No wheezing.  HEART: Regular rate and rhythm; No murmurs, rubs, or gallops  ABDOMEN: Soft, Nontender, Nondistended; Bowel sounds present  EXTREMITIES:   No edema  NEUROLOGY: AAO X 3      LABS:                        16.0   9.62  )-----------( 209      ( 22 Oct 2021 14:30 )             52.9     10-21    137  |  94<L>  |  64<H>  ----------------------------<  204<H>  4.2   |  28  |  2.31<H>    Ca    9.4      21 Oct 2021 17:00    TPro  7.5  /  Alb  4.1  /  TBili  0.4  /  DBili  x   /  AST  16  /  ALT  8<L>  /  AlkPhos  104  10-21    PT/INR - ( 21 Oct 2021 17:00 )   PT: 18.9 sec;   INR: 1.61 ratio         PTT - ( 22 Oct 2021 14:30 )  PTT:>200.0 sec        CAPILLARY BLOOD GLUCOSE      POCT Blood Glucose.: 180 mg/dL (22 Oct 2021 21:56)  POCT Blood Glucose.: 101 mg/dL (22 Oct 2021 18:28)  POCT Blood Glucose.: 195 mg/dL (22 Oct 2021 12:25)  POCT Blood Glucose.: 197 mg/dL (22 Oct 2021 08:43)  POCT Blood Glucose.: 183 mg/dL (22 Oct 2021 01:43)        RADIOLOGY & ADDITIONAL TESTS:    Imaging Personally Reviewed:    Consultant(s) Notes Reviewed:      Care Discussed with Consultants/Other Providers:    Ricky Brooks MD, CMD, FACP    [address] [address]  Office Tel: [phone]  Cell: [phone]

## 2021-10-22 NOTE — PROVIDER CONTACT NOTE (CRITICAL VALUE NOTIFICATION) - ACTION/TREATMENT ORDERED:
Heparin nomogram followed- hold for 60 minutes, decrease rate to 20 ml/hr. Heparin gtt to be restarted at 20ml/hr at 0630.
Heparin gtt stopped for 60 minutes and to restart @ 1600 units/hr.

## 2021-10-22 NOTE — PHYSICAL THERAPY INITIAL EVALUATION ADULT - PERTINENT HX OF CURRENT PROBLEM, REHAB EVAL
67 yo gentleman with PMH of CAD and NSTEMI s/p 3 stents in 2014 to LAD, proximal and distal LCx, ischemic cardiomyopathy, htn, T2DM c/b peripheral neuropathy, CVA/TIA, atrial fibrillation on pradaxa, chronic RLE wound, left carotid stenosis s/p endarterectomy, gout who presents to the ED with complaint of lightheadedness and dizziness. Episodes of dizziness and lightheadedness occur intermittently, typically after dinner while he is sitting.

## 2021-10-22 NOTE — PROVIDER CONTACT NOTE (CRITICAL VALUE NOTIFICATION) - ASSESSMENT
AO x 4. VSS. A fib on tele.
Pt AOx4, denies any chest pain SOB or distress. Pt with no s/s of bleeding. aPTT > 200

## 2021-10-22 NOTE — PROVIDER CONTACT NOTE (CRITICAL VALUE NOTIFICATION) - SITUATION
PTT >200
Pt on heparin gtt @ 24ml/hr, aPTT > 200. Heparin needs to be re-ordered due to drug dosing weight entered by ED after heparin was initiated.

## 2021-10-22 NOTE — PHYSICAL THERAPY INITIAL EVALUATION ADULT - PRECAUTIONS/LIMITATIONS, REHAB EVAL
CONT: He notes feeling "zoned out" and unsteady, and has had 2 episodes of near falls.  He denies any vertiginous dizziness or LOC. He had an episode again this morning after breakfast. Patient reports symptoms are similar to when he required his L CEA, however not as severe. Patient has not checked fingerstick BS during these episodes. He sometimes sees "white stars" during the episodes. He reports BP is usually well controlled. He has chronic neuropathy affecting lower extremities to knees bilaterally, but no focal weakness. Patient denies any fever, chills, nausea, vomiting. He also denies any CP or palpitations. He is currently getting treated for chronic RLE wound which has been improving under the management of wound  care doctor. CT head: No hydrocephalus, acute intracranial hemorrhage, mass effect, or brain edema.

## 2021-10-22 NOTE — PHYSICAL THERAPY INITIAL EVALUATION ADULT - MODALITIES TREATMENT COMMENTS
Chronic wound noted on L anterior superior and inferior shin. L anterior superior wound with closed surface/scabbed wound bed. L anterior inferior wound measuring 3.5cm x 1cm x 0.2cm. Wound with 100% macerations around borders of wound. No induration, no malodor, no erythema. Wound cleansed with NS, cavilon to periwound, aqacel to wound bed, followed by non-woven 4x4 on top, wrapped with shweta, secured with tape. Chronic wound noted on R anterior superior and inferior shin. R anterior superior wound with closed surface/scabbed wound bed. R anterior inferior wound measuring 3.5cm x 1cm x 0.2cm. Wound with 100% macerations around borders of wound. No induration, no malodor, no erythema. Wound cleansed with NS, cavilon to periwound, aqacel to wound bed, followed by non-woven 4x4 on top, wrapped with shweta, secured with tape.

## 2021-10-22 NOTE — PHYSICAL THERAPY INITIAL EVALUATION ADULT - GENERAL OBSERVATIONS, REHAB EVAL
Pt received seated in chair, A&Ox4, agreeable to physical therapy WC and functional louie kevin 45 min.

## 2021-10-22 NOTE — PROGRESS NOTE ADULT - SUBJECTIVE AND OBJECTIVE BOX
Patient is a 66y old  Male who presents with a chief complaint of dizziness and lightheadedness (21 Oct 2021 21:45)      Vascular Surgery Attending Progress Note    Interval HPI: pt w/o new c/o    Medications:  acetaminophen     Tablet .. 650 milliGRAM(s) Oral every 6 hours PRN  allopurinol 200 milliGRAM(s) Oral daily  aspirin  chewable 81 milliGRAM(s) Oral daily  buMETAnide 2 milliGRAM(s) Oral daily  dextrose 40% Gel 15 Gram(s) Oral once  dextrose 5%. 1000 milliLiter(s) IV Continuous <Continuous>  dextrose 5%. 1000 milliLiter(s) IV Continuous <Continuous>  dextrose 50% Injectable 25 Gram(s) IV Push once  dextrose 50% Injectable 12.5 Gram(s) IV Push once  dextrose 50% Injectable 25 Gram(s) IV Push once  diltiazem    milliGRAM(s) Oral daily  glucagon  Injectable 1 milliGRAM(s) IntraMuscular once  heparin   Injectable 59575 Unit(s) IV Push every 6 hours PRN  heparin   Injectable 5000 Unit(s) IV Push every 6 hours PRN  heparin  Infusion. 2000 Unit(s)/Hr IV Continuous <Continuous>  insulin glargine Injectable (LANTUS) 25 Unit(s) SubCutaneous at bedtime  insulin lispro (ADMELOG) corrective regimen sliding scale   SubCutaneous three times a day before meals  insulin lispro (ADMELOG) corrective regimen sliding scale   SubCutaneous at bedtime  insulin lispro Injectable (ADMELOG) 10 Unit(s) SubCutaneous three times a day before meals  metolazone 5 milliGRAM(s) Oral <User Schedule>  metoprolol succinate ER 50 milliGRAM(s) Oral two times a day  oxycodone    5 mG/acetaminophen 325 mG 1 Tablet(s) Oral every 12 hours  pregabalin 100 milliGRAM(s) Oral three times a day      Vital Signs Last 24 Hrs  T(C): 36.6 (22 Oct 2021 05:01), Max: 36.6 (21 Oct 2021 12:00)  T(F): 97.9 (22 Oct 2021 05:01), Max: 97.9 (22 Oct 2021 05:01)  HR: 98 (22 Oct 2021 05:01) (62 - 98)  BP: 150/74 (22 Oct 2021 05:01) (116/69 - 153/70)  BP(mean): --  RR: 20 (22 Oct 2021 05:01) (18 - 20)  SpO2: 97% (22 Oct 2021 05:01) (94% - 99%)  I&O's Summary    21 Oct 2021 07:01  -  22 Oct 2021 07:00  --------------------------------------------------------  IN: 0 mL / OUT: 500 mL / NET: -500 mL        Physical Exam:  Neuro  A&Ox3 VSS  Vascular:  stable      LABS:                        14.5   9.28  )-----------( 191      ( 22 Oct 2021 04:02 )             47.0     10-21    137  |  94<L>  |  64<H>  ----------------------------<  204<H>  4.2   |  28  |  2.31<H>    Ca    9.4      21 Oct 2021 17:00    TPro  7.5  /  Alb  4.1  /  TBili  0.4  /  DBili  x   /  AST  16  /  ALT  8<L>  /  AlkPhos  104  10-21    PT/INR - ( 21 Oct 2021 17:00 )   PT: 18.9 sec;   INR: 1.61 ratio         PTT - ( 22 Oct 2021 04:02 )  PTT:>200.0 sec    UMAIR MCGRAW MD  [phone] Cell [phone]

## 2021-10-22 NOTE — PROGRESS NOTE ADULT - SUBJECTIVE AND OBJECTIVE BOX
Cardiovascular Disease Progress Note: Covering for Dr. Mazariegos    Overnight events: No acute events overnight.  Mr. Stevens admits to episodes of dizziness. Denies chest pain or palpitations.   Otherwise review of systems negative    Objective Findings:  T(C): 36.6 (10-22-21 @ 05:01), Max: 36.6 (10-21-21 @ 12:00)  HR: 98 (10-22-21 @ 05:01) (62 - 98)  BP: 150/74 (10-22-21 @ 05:01) (116/69 - 153/70)  RR: 20 (10-22-21 @ 05:01) (18 - 20)  SpO2: 97% (10-22-21 @ 05:01) (94% - 99%)  Wt(kg): --  Daily Height in cm: 180.34 (21 Oct 2021 12:00)    Daily       Physical Exam:  Gen: NAD; Patient resting comfortably. Obese  HEENT: EOMI, Normocephalic/ atraumatic. R carotid bruit on auscultation.   CV: Irregular rhythm,, normal S1 + S2, no m/r/g  Lungs:  Normal respiratory effort; clear to auscultation bilaterally  Abd: soft, non-tender; bowel sounds present  Ext: No edema; warm and well perfused    Telemetry: Afib    Laboratory Data:                        14.5   9.28  )-----------( 191      ( 22 Oct 2021 04:02 )             47.0     10-21    137  |  94<L>  |  64<H>  ----------------------------<  204<H>  4.2   |  28  |  2.31<H>    Ca    9.4      21 Oct 2021 17:00    TPro  7.5  /  Alb  4.1  /  TBili  0.4  /  DBili  x   /  AST  16  /  ALT  8<L>  /  AlkPhos  104  10-21    PT/INR - ( 21 Oct 2021 17:00 )   PT: 18.9 sec;   INR: 1.61 ratio         PTT - ( 22 Oct 2021 04:02 )  PTT:>200.0 sec          Inpatient Medications:  MEDICATIONS  (STANDING):  allopurinol 200 milliGRAM(s) Oral daily  aspirin  chewable 81 milliGRAM(s) Oral daily  buMETAnide 2 milliGRAM(s) Oral daily  dextrose 40% Gel 15 Gram(s) Oral once  dextrose 5%. 1000 milliLiter(s) (50 mL/Hr) IV Continuous <Continuous>  dextrose 5%. 1000 milliLiter(s) (100 mL/Hr) IV Continuous <Continuous>  dextrose 50% Injectable 25 Gram(s) IV Push once  dextrose 50% Injectable 12.5 Gram(s) IV Push once  dextrose 50% Injectable 25 Gram(s) IV Push once  diltiazem    milliGRAM(s) Oral daily  glucagon  Injectable 1 milliGRAM(s) IntraMuscular once  heparin  Infusion. 2000 Unit(s)/Hr (20 mL/Hr) IV Continuous <Continuous>  insulin glargine Injectable (LANTUS) 25 Unit(s) SubCutaneous at bedtime  insulin lispro (ADMELOG) corrective regimen sliding scale   SubCutaneous three times a day before meals  insulin lispro (ADMELOG) corrective regimen sliding scale   SubCutaneous at bedtime  insulin lispro Injectable (ADMELOG) 10 Unit(s) SubCutaneous three times a day before meals  metolazone 5 milliGRAM(s) Oral <User Schedule>  metoprolol succinate ER 50 milliGRAM(s) Oral two times a day  oxycodone    5 mG/acetaminophen 325 mG 1 Tablet(s) Oral every 12 hours  pregabalin 100 milliGRAM(s) Oral three times a day      Assessment: 67 y/o male with a past medical history of HTN, DMT2 c/b LLE neuropathy, CVA/TIA, Afib (on Pradaxa , MI, CAD (NSTEMI) x 3 stents in 2014 to LAD, and proximal and distal LCx, ischemic cardiomyopathy, chronic unhealing RLE wound s/p angio, left CEA (2014) presents with dizziness    Plan of Care:    #R carotid stenosis  Dizziness may be 2/2 to worsening carotid disease  Obtain carotid dopplers  Agree with neuro consult for dizziness.   Vascular input appreciated  Continue ASA and Statin Therapy    #CAD  S/p PCI to LAD and LCX in 2014  TTE 2019 shows mildly reduced LVEF 45%  Continue BB, Asa, and Statin therapy  Trop T elevated in setting of CKD  EKG shows afib. No active ischemia noted.     #Ischemic cardiomyopathy  Pt euvolemic on exam  Continue BB, ASA, Statin  Bumex and Metolazone M,W,F    #HTN  BP acceptable  Continue     #DM2  ISS  Management as per primary team    #CKD  Management as per primary team    #Afib  Pt on Pradaxa at home  Will transition to heparin infusion while inpatient.           Over 25 minutes spent on total encounter; more than 50% of the visit was spent counseling and/or coordinating care by the attending physician.      Fredi Wesley D.O.   Cardiovascular Disease  [phone]

## 2021-10-22 NOTE — PHYSICAL THERAPY INITIAL EVALUATION ADULT - PLANNED THERAPY INTERVENTIONS, PT EVAL
GOAL: WC MANAGEMENT / GOAL: Pt will be able to Negotiate up/down 10 steps, independently, w/ unilateral rail/and appropriate assistive device, w/reciprocal/step-to gait pattern, in 2 weeks./bed mobility training/gait training/strengthening/transfer training

## 2021-10-22 NOTE — PHYSICAL THERAPY INITIAL EVALUATION ADULT - DIAGNOSIS, PT EVAL
Pt with decreased strength, endurance and balance leading to mild impairment in functional mobility. /integumentary impairment

## 2021-10-23 NOTE — PROGRESS NOTE ADULT - ASSESSMENT
This patient is a 67yo gentleman with PMH of CAD and NSTEMI s/p 3 stents in 2014 to LAD, proximal and distal LCx, ischemic cardiomyopathy, htn, T2DM c/b peripheral neuropathy, CVA/TIA, atrial fibrillation on pradaxa, chronic RLE wound, left carotid stenosis s/p endarterectomy, gout who presents to the ED with complaint of lightheadedness and dizziness. Episodes of dizziness and lightheadedness occur intermittently, typically after dinner while he is sitting. He notes feeling "zoned out" and unsteady, and has had 2 episodes of near falls.  He denies any vertiginous dizziness or LOC. He had an episode again this morning after breakfast. Patient reports symptoms are similar to when he required his L CEA, however not as severe. Patient has not checked fingerstick BS during these episodes. He sometimes sees "white stars" during the episodes. He reports BP is usually well controlled. He has chronic neuropathy affecting lower extremities to knees bilaterally, but no focal weakness. Patient denies any fever, chills, nausea, vomiting. He also denies any CP or palpitations. He is currently getting treated for chronic RLE wound which has been improving under the management of wound  care doctor.    Lab Facility: [id number] Lab Facility: [id number]

## 2021-10-23 NOTE — CHART NOTE - NSCHARTNOTEFT_GEN_A_CORE
65 y/o male with a past medical history of HTN, DMT2 c/b LLE neuropathy, CVA/TIA, Afib (on Pradaxa , MI, CAD x 3 stents, chronic unhealing RLE wound s/p angio, left CEA (2014) presents with dizziness. outpatient with high grade stenosis of the right carotid artery.     PLAN:   - stable from vasc surg standpoint  - Carotid duplex showed no evidence of carotid stenosis  - Vertebral arteries not visualized, recommend CTA neck to evaluate vertebral artery     [id number]

## 2021-10-23 NOTE — PROGRESS NOTE ADULT - SUBJECTIVE AND OBJECTIVE BOX
Patient is a 66y old  Male who presents with a chief complaint of dizziness and lightheadedness (23 Oct 2021 12:39)      SUBJECTIVE / OVERNIGHT EVENTS:    Events noted.  CONSTITUTIONAL: No fever,  or fatigue  RESPIRATORY: No cough, wheezing,  No shortness of breath  CARDIOVASCULAR: No chest pain, palpitations, dizziness, or leg swelling  GASTROINTESTINAL: No abdominal or epigastric pain. No nausea, vomiting.  NEUROLOGICAL: No headaches,     MEDICATIONS  (STANDING):  allopurinol 200 milliGRAM(s) Oral daily  aspirin  chewable 81 milliGRAM(s) Oral daily  buMETAnide 2 milliGRAM(s) Oral daily  dextrose 40% Gel 15 Gram(s) Oral once  dextrose 5%. 1000 milliLiter(s) (50 mL/Hr) IV Continuous <Continuous>  dextrose 5%. 1000 milliLiter(s) (100 mL/Hr) IV Continuous <Continuous>  dextrose 50% Injectable 25 Gram(s) IV Push once  dextrose 50% Injectable 12.5 Gram(s) IV Push once  dextrose 50% Injectable 25 Gram(s) IV Push once  diltiazem    milliGRAM(s) Oral daily  glucagon  Injectable 1 milliGRAM(s) IntraMuscular once  heparin  Infusion. 2000 Unit(s)/Hr (20 mL/Hr) IV Continuous <Continuous>  insulin glargine Injectable (LANTUS) 25 Unit(s) SubCutaneous at bedtime  insulin lispro (ADMELOG) corrective regimen sliding scale   SubCutaneous three times a day before meals  insulin lispro (ADMELOG) corrective regimen sliding scale   SubCutaneous at bedtime  insulin lispro Injectable (ADMELOG) 10 Unit(s) SubCutaneous three times a day before meals  metolazone 5 milliGRAM(s) Oral <User Schedule>  metoprolol succinate ER 50 milliGRAM(s) Oral two times a day  oxycodone    5 mG/acetaminophen 325 mG 1 Tablet(s) Oral every 12 hours  pregabalin 100 milliGRAM(s) Oral three times a day    MEDICATIONS  (PRN):  acetaminophen     Tablet .. 650 milliGRAM(s) Oral every 6 hours PRN Temp greater or equal to 38C (100.4F), Mild Pain (1 - 3)  heparin   Injectable 57698 Unit(s) IV Push every 6 hours PRN For aPTT less than 40  heparin   Injectable 5000 Unit(s) IV Push every 6 hours PRN For aPTT between 40 - 57        CAPILLARY BLOOD GLUCOSE      POCT Blood Glucose.: 201 mg/dL (23 Oct 2021 21:09)  POCT Blood Glucose.: 181 mg/dL (23 Oct 2021 16:46)  POCT Blood Glucose.: 182 mg/dL (23 Oct 2021 11:42)  POCT Blood Glucose.: 165 mg/dL (23 Oct 2021 07:55)    I&O's Summary    22 Oct 2021 07:01  -  23 Oct 2021 07:00  --------------------------------------------------------  IN: 506 mL / OUT: 1050 mL / NET: -544 mL    23 Oct 2021 07:01  -  24 Oct 2021 01:23  --------------------------------------------------------  IN: 960 mL / OUT: 3100 mL / NET: -2140 mL        T(C): 36.8 (10-24-21 @ 00:08), Max: 36.9 (10-23-21 @ 12:33)  HR: 80 (10-24-21 @ 00:08) (76 - 92)  BP: 143/72 (10-24-21 @ 00:08) (143/72 - 174/76)  RR: 18 (10-24-21 @ 00:08) (18 - 18)  SpO2: 93% (10-24-21 @ 00:08) (92% - 98%)    PHYSICAL EXAM:  GENERAL: NAD  NECK: Supple, No JVD  CHEST/LUNG: Clear to auscultation bilaterally; No wheezing.  HEART: Regular rate and rhythm; No murmurs, rubs, or gallops  ABDOMEN: Soft, Nontender, Nondistended; Bowel sounds present  EXTREMITIES:   No edema  NEUROLOGY: AAO X 3      LABS:                        14.7   8.86  )-----------( 197      ( 23 Oct 2021 06:35 )             49.4     10-23    137  |  97  |  49<H>  ----------------------------<  165<H>  3.4<L>   |  25  |  1.78<H>    Ca    9.3      23 Oct 2021 06:36      PTT - ( 23 Oct 2021 13:53 )  PTT:83.2 sec        CAPILLARY BLOOD GLUCOSE      POCT Blood Glucose.: 201 mg/dL (23 Oct 2021 21:09)  POCT Blood Glucose.: 181 mg/dL (23 Oct 2021 16:46)  POCT Blood Glucose.: 182 mg/dL (23 Oct 2021 11:42)  POCT Blood Glucose.: 165 mg/dL (23 Oct 2021 07:55)        RADIOLOGY & ADDITIONAL TESTS:    Imaging Personally Reviewed:    Consultant(s) Notes Reviewed:      Care Discussed with Consultants/Other Providers:    Ricky Brooks MD, CMD, FACP    [address] [place] [address]  Office Tel: [phone]  Cell: [phone]

## 2021-10-23 NOTE — PROGRESS NOTE ADULT - SUBJECTIVE AND OBJECTIVE BOX
Cardiovascular Disease Progress Note: Covering for Dr. Mazariegos    Overnight events: No acute events overnight.  Mr. Stevens denies chest pain or palpitations. He states his dizziness has improved.   Otherwise review of systems negative    Objective Findings:  T(C): 36.9 (10-23-21 @ 12:33), Max: 36.9 (10-22-21 @ 20:54)  HR: 92 (10-23-21 @ 12:33) (73 - 92)  BP: 165/73 (10-23-21 @ 12:33) (94/58 - 166/78)  RR: 18 (10-23-21 @ 12:33) (18 - 19)  SpO2: 95% (10-23-21 @ 12:33) (94% - 98%)  Wt(kg): --  Daily     Daily       Physical Exam:  Gen: NAD; Patient resting comfortably  HEENT: EOMI, Normocephalic/ atraumatic  CV: RRR, normal S1 + S2, no m/r/g  Lungs:  Normal respiratory effort; clear to auscultation bilaterally  Abd: soft, non-tender; bowel sounds present  Ext: No edema; warm and well perfused    Telemetry:     Laboratory Data:                        14.7   8.86  )-----------( 197      ( 23 Oct 2021 06:35 )             49.4     10-23    137  |  97  |  49<H>  ----------------------------<  165<H>  3.4<L>   |  25  |  1.78<H>    Ca    9.3      23 Oct 2021 06:36    TPro  7.5  /  Alb  4.1  /  TBili  0.4  /  DBili  x   /  AST  16  /  ALT  8<L>  /  AlkPhos  104  10-21    PT/INR - ( 21 Oct 2021 17:00 )   PT: 18.9 sec;   INR: 1.61 ratio         PTT - ( 23 Oct 2021 06:35 )  PTT:76.5 sec          Inpatient Medications:  MEDICATIONS  (STANDING):  allopurinol 200 milliGRAM(s) Oral daily  aspirin  chewable 81 milliGRAM(s) Oral daily  buMETAnide 2 milliGRAM(s) Oral daily  dextrose 40% Gel 15 Gram(s) Oral once  dextrose 5%. 1000 milliLiter(s) (50 mL/Hr) IV Continuous <Continuous>  dextrose 5%. 1000 milliLiter(s) (100 mL/Hr) IV Continuous <Continuous>  dextrose 50% Injectable 25 Gram(s) IV Push once  dextrose 50% Injectable 12.5 Gram(s) IV Push once  dextrose 50% Injectable 25 Gram(s) IV Push once  diltiazem    milliGRAM(s) Oral daily  glucagon  Injectable 1 milliGRAM(s) IntraMuscular once  heparin  Infusion. 2000 Unit(s)/Hr (20 mL/Hr) IV Continuous <Continuous>  insulin glargine Injectable (LANTUS) 25 Unit(s) SubCutaneous at bedtime  insulin lispro (ADMELOG) corrective regimen sliding scale   SubCutaneous three times a day before meals  insulin lispro (ADMELOG) corrective regimen sliding scale   SubCutaneous at bedtime  insulin lispro Injectable (ADMELOG) 10 Unit(s) SubCutaneous three times a day before meals  metolazone 5 milliGRAM(s) Oral <User Schedule>  metoprolol succinate ER 50 milliGRAM(s) Oral two times a day  oxycodone    5 mG/acetaminophen 325 mG 1 Tablet(s) Oral every 12 hours  pregabalin 100 milliGRAM(s) Oral three times a day      Assessment: 67 y/o male with a past medical history of HTN, DMT2 c/b LLE neuropathy, CVA/TIA, Afib (on Pradaxa , MI, CAD (NSTEMI) x 3 stents in 2014 to LAD, and proximal and distal LCx, ischemic cardiomyopathy, chronic unhealing RLE wound s/p angio, left CEA (2014) presents with dizziness    Plan of Care:    #R carotid stenosis  Coppler reveals no significant stenosis  Vascular input noted: no intervention planned at this time  Agree with neuro consult for dizziness.   Continue ASA and Statin Therapy    #CAD  S/p PCI to LAD and LCX in 2014  TTE 2019 shows mildly reduced LVEF 45%  Continue BB, Asa, and Statin therapy  Trop T elevated in setting of CKD  EKG shows afib. No active ischemia noted.     #Ischemic cardiomyopathy  Pt euvolemic on exam  Continue BB, ASA, Statin  Bumex and Metolazone M,W,F    #HTN  BP acceptable  Continue     #DM2  ISS  Management as per primary team    #CKD  Improving  Management as per primary team    #Afib  Pt on Pradaxa at home  Will transition to heparin infusion while inpatient.           Over 25 minutes spent on total encounter; more than 50% of the visit was spent counseling and/or coordinating care by the attending physician.      Fredi Wesley D.O.   Cardiovascular Disease  [phone] Cardiovascular Disease Progress Note: Covering for Dr. Mazariegos    Overnight events: No acute events overnight.  Mr. Stevens denies chest pain or palpitations. He states his dizziness has improved.   Otherwise review of systems negative    Objective Findings:  T(C): 36.9 (10-23-21 @ 12:33), Max: 36.9 (10-22-21 @ 20:54)  HR: 92 (10-23-21 @ 12:33) (73 - 92)  BP: 165/73 (10-23-21 @ 12:33) (94/58 - 166/78)  RR: 18 (10-23-21 @ 12:33) (18 - 19)  SpO2: 95% (10-23-21 @ 12:33) (94% - 98%)  Wt(kg): --  Daily     Daily       Physical Exam:  Gen: NAD; Patient resting comfortably  HEENT: EOMI, Normocephalic/ atraumatic  CV: RRR, normal S1 + S2, no m/r/g  Lungs:  Normal respiratory effort; clear to auscultation bilaterally  Abd: soft, non-tender; bowel sounds present  Ext: No edema; warm and well perfused    Telemetry: Afib    Laboratory Data:                        14.7   8.86  )-----------( 197      ( 23 Oct 2021 06:35 )             49.4     10-23    137  |  97  |  49<H>  ----------------------------<  165<H>  3.4<L>   |  25  |  1.78<H>    Ca    9.3      23 Oct 2021 06:36    TPro  7.5  /  Alb  4.1  /  TBili  0.4  /  DBili  x   /  AST  16  /  ALT  8<L>  /  AlkPhos  104  10-21    PT/INR - ( 21 Oct 2021 17:00 )   PT: 18.9 sec;   INR: 1.61 ratio         PTT - ( 23 Oct 2021 06:35 )  PTT:76.5 sec          Inpatient Medications:  MEDICATIONS  (STANDING):  allopurinol 200 milliGRAM(s) Oral daily  aspirin  chewable 81 milliGRAM(s) Oral daily  buMETAnide 2 milliGRAM(s) Oral daily  dextrose 40% Gel 15 Gram(s) Oral once  dextrose 5%. 1000 milliLiter(s) (50 mL/Hr) IV Continuous <Continuous>  dextrose 5%. 1000 milliLiter(s) (100 mL/Hr) IV Continuous <Continuous>  dextrose 50% Injectable 25 Gram(s) IV Push once  dextrose 50% Injectable 12.5 Gram(s) IV Push once  dextrose 50% Injectable 25 Gram(s) IV Push once  diltiazem    milliGRAM(s) Oral daily  glucagon  Injectable 1 milliGRAM(s) IntraMuscular once  heparin  Infusion. 2000 Unit(s)/Hr (20 mL/Hr) IV Continuous <Continuous>  insulin glargine Injectable (LANTUS) 25 Unit(s) SubCutaneous at bedtime  insulin lispro (ADMELOG) corrective regimen sliding scale   SubCutaneous three times a day before meals  insulin lispro (ADMELOG) corrective regimen sliding scale   SubCutaneous at bedtime  insulin lispro Injectable (ADMELOG) 10 Unit(s) SubCutaneous three times a day before meals  metolazone 5 milliGRAM(s) Oral <User Schedule>  metoprolol succinate ER 50 milliGRAM(s) Oral two times a day  oxycodone    5 mG/acetaminophen 325 mG 1 Tablet(s) Oral every 12 hours  pregabalin 100 milliGRAM(s) Oral three times a day      Assessment: 67 y/o male with a past medical history of HTN, DMT2 c/b LLE neuropathy, CVA/TIA, Afib (on Pradaxa , MI, CAD (NSTEMI) x 3 stents in 2014 to LAD, and proximal and distal LCx, ischemic cardiomyopathy, chronic unhealing RLE wound s/p angio, left CEA (2014) presents with dizziness    Plan of Care:    #R carotid stenosis  Coppler reveals no significant stenosis  Vascular input noted: no intervention planned at this time  Agree with neuro consult for dizziness.   Continue ASA and Statin Therapy    #CAD  S/p PCI to LAD and LCX in 2014  TTE 2019 shows mildly reduced LVEF 45%  Continue BB, Asa, and Statin therapy  Trop T elevated in setting of CKD  EKG shows afib. No active ischemia noted.     #Ischemic cardiomyopathy  Pt euvolemic on exam  Continue BB, ASA, Statin  Bumex and Metolazone M,W,F    #HTN  BP acceptable  Continue     #DM2  ISS  Management as per primary team    #CKD  Improving  Management as per primary team    #Afib  Pt on Pradaxa at home  Will transition to heparin infusion while inpatient.           Over 25 minutes spent on total encounter; more than 50% of the visit was spent counseling and/or coordinating care by the attending physician.      Fredi Wesley D.O.   Cardiovascular Disease  [phone]

## 2021-10-24 NOTE — PROGRESS NOTE ADULT - SUBJECTIVE AND OBJECTIVE BOX
Cardiovascular Disease Progress Note: Covering for Dr. Mazariegos    Overnight events: No acute events overnight.    Otherwise review of systems negative    Objective Findings:  T(C): 36.8 (10-24-21 @ 11:10), Max: 36.9 (10-23-21 @ 12:33)  HR: 74 (10-24-21 @ 11:10) (74 - 97)  BP: 144/75 (10-24-21 @ 11:10) (143/72 - 174/76)  RR: 18 (10-24-21 @ 11:10) (18 - 20)  SpO2: 96% (10-24-21 @ 11:10) (92% - 97%)  Wt(kg): --  Daily     Daily       Physical Exam:  Gen: NAD; Patient resting comfortably  HEENT: EOMI, Normocephalic/ atraumatic  CV: RRR, normal S1 + S2, no m/r/g  Lungs:  Normal respiratory effort; clear to auscultation bilaterally  Abd: soft, non-tender; bowel sounds present  Ext: No edema; warm and well perfused    Telemetry: Afib    Laboratory Data:                        15.0   9.08  )-----------( 200      ( 24 Oct 2021 06:38 )             48.6     10-24    136  |  96  |  51<H>  ----------------------------<  172<H>  3.7   |  25  |  1.88<H>    Ca    9.2      24 Oct 2021 06:38      PTT - ( 24 Oct 2021 09:09 )  PTT:60.7 sec          Inpatient Medications:  MEDICATIONS  (STANDING):  allopurinol 200 milliGRAM(s) Oral daily  aspirin  chewable 81 milliGRAM(s) Oral daily  buMETAnide 2 milliGRAM(s) Oral daily  dextrose 40% Gel 15 Gram(s) Oral once  dextrose 5%. 1000 milliLiter(s) (50 mL/Hr) IV Continuous <Continuous>  dextrose 5%. 1000 milliLiter(s) (100 mL/Hr) IV Continuous <Continuous>  dextrose 50% Injectable 25 Gram(s) IV Push once  dextrose 50% Injectable 12.5 Gram(s) IV Push once  dextrose 50% Injectable 25 Gram(s) IV Push once  diltiazem    milliGRAM(s) Oral daily  glucagon  Injectable 1 milliGRAM(s) IntraMuscular once  heparin  Infusion. 2000 Unit(s)/Hr (20 mL/Hr) IV Continuous <Continuous>  insulin glargine Injectable (LANTUS) 25 Unit(s) SubCutaneous at bedtime  insulin lispro (ADMELOG) corrective regimen sliding scale   SubCutaneous three times a day before meals  insulin lispro (ADMELOG) corrective regimen sliding scale   SubCutaneous at bedtime  insulin lispro Injectable (ADMELOG) 10 Unit(s) SubCutaneous three times a day before meals  metolazone 5 milliGRAM(s) Oral <User Schedule>  metoprolol succinate ER 50 milliGRAM(s) Oral two times a day  oxycodone    5 mG/acetaminophen 325 mG 1 Tablet(s) Oral every 12 hours  pregabalin 100 milliGRAM(s) Oral three times a day      Assessment: 67 y/o male with a past medical history of HTN, DMT2 c/b LLE neuropathy, CVA/TIA, Afib (on Pradaxa , MI, CAD (NSTEMI) x 3 stents in 2014 to LAD, and proximal and distal LCx, ischemic cardiomyopathy, chronic unhealing RLE wound s/p angio, left CEA (2014) presents with dizziness    Plan of Care:    #R carotid stenosis  Doppler reveals no significant stenosis  Vascular input noted: no intervention planned at this time  Agree with neuro consult for dizziness.   Continue ASA and Statin Therapy  MRA pending    #CAD  S/p PCI to LAD and LCX in 2014  TTE 2019 shows mildly reduced LVEF 45%  Continue BB, Asa, and Statin therapy  Trop T elevated in setting of CKD  EKG shows afib. No active ischemia noted.     #Ischemic cardiomyopathy  Pt euvolemic on exam  Continue BB, ASA, Statin  Bumex and Metolazone M,W,F    #HTN  BP acceptable  Continue     #DM2  ISS  Management as per primary team    #CKD  Improving  Management as per primary team    #Afib  Pt on Pradaxa at home  Will transition to heparin infusion while inpatient.           Over 25 minutes spent on total encounter; more than 50% of the visit was spent counseling and/or coordinating care by the attending physician.      Fredi Wesley D.O.   Cardiovascular Disease  [phone]

## 2021-10-24 NOTE — CHART NOTE - NSCHARTNOTEFT_GEN_A_CORE
PA Medicine Event Note    Patient requesting NC today for comfort while laying down. Patient states at home he also experiences some SOB while laying flat.  Denying any CP, palpitations, N/V, abd pain, HA.      Vital Signs Last 24 Hrs  T(C): 36.8 (24 Oct 2021 11:10), Max: 36.8 (24 Oct 2021 00:08)  T(F): 98.2 (24 Oct 2021 11:10), Max: 98.3 (24 Oct 2021 00:08)  HR: 74 (24 Oct 2021 11:10) (74 - 97)  BP: 144/75 (24 Oct 2021 11:10) (143/72 - 174/76)  BP(mean): --  RR: 18 (24 Oct 2021 11:10) (18 - 20)  SpO2: 96% (24 Oct 2021 11:10) (92% - 97%)      PHYSICAL EXAM:  GENERAL: Laying down, in no acute distress  PSYCH: AAOx3  HEAD:  Atraumatic, Normocephalic  EYES: EOMI, PERRLA, conjunctiva and sclera clear  NECK: Supple, No JVD  CHEST/LUNG: Breath sounds clear to auscultation bilaterally.  No wheezes, rales, rhonchi or crackles  HEART: +S1/S2.  Regular rate and rhythm.  No murmurs, rubs or gallops  ABDOMEN: Bowel sounds present in all 4 quadrants.  Soft, Nontender, Nondistended  EXTREMITIES: No edema or erythema noted in bilateral lower extremities  NEUROLOGY: Cranial nerves 2-12 grossly intact  SKIN: No rashes or lesions    CXR- sm left pleural effusion, similar to previous.    Continue po bumex 2 mg daily  Will cont to monitor patient and will endorse to night team.    Carmela Isaac PA-C  Dept of Medicine  #[id number]

## 2021-10-24 NOTE — CONSULT NOTE ADULT - ASSESSMENT
This is a 66y Male, here with dizziness, more lightheaded and not vertigo.  Seems to be orthostatic by history.    Labs show some ANT, better since admission.      Also likely has autonomic component of neuropathy with orthostatic hypotension    Lyrcia/opiates with ANT/CKD can contribute    Doubt TIA/CVA.  Doubt role of stenosis of cerebrovasculature.  carotid doppler showed no significant stenosis    neuro appears stable    Plan:  - MRA neck ordered by team.  MRI brain not ordered.  Neither seems high yield  - follow orthostatics.  per pt only done once and negative  - on IVF  - cautious use of diuretics  - optimal DM control   - cautious use of lyrica/opiates  - PT eval

## 2021-10-24 NOTE — PROGRESS NOTE ADULT - ASSESSMENT
This patient is a 67yo gentleman with PMH of CAD and NSTEMI s/p 3 stents in 2014 to LAD, proximal and distal LCx, ischemic cardiomyopathy, htn, T2DM c/b peripheral neuropathy, CVA/TIA, atrial fibrillation on pradaxa, chronic RLE wound, left carotid stenosis s/p endarterectomy, gout who presents to the ED with complaint of lightheadedness and dizziness

## 2021-10-24 NOTE — PROGRESS NOTE ADULT - SUBJECTIVE AND OBJECTIVE BOX
DATE OF SERVICE: 10-24-21 @ 11:25  CHIEF COMPLAINT:Patient is a 66y old  Male who presents with a chief complaint of dizziness and lightheadedness (23 Oct 2021 16:22)    	        PAST MEDICAL & SURGICAL HISTORY:  HTN (hypertension), benign    HLD (hyperlipidemia)    DM type 2 (diabetes mellitus, type 2)    TIA (transient ischemic attack)    Atrial fibrillation    MI (myocardial infarction)  circa 2014    CAD (coronary artery disease)    Neuropathy    Status post angioplasty with stent  LULU x 3 2/7/2014    S/P carotid endarterectomy  left            REVIEW OF SYSTEMS:  feels better  RESPIRATORY: No cough, wheezing, chills or hemoptysis; No Shortness of Breath  CARDIOVASCULAR: No chest pain, palpitations, passing out,   GASTROINTESTINAL: No abdominal or epigastric pain. No nausea, vomiting, or hematemesis; No diarrhea or constipation. No melena or hematochezia.  GENITOURINARY: No dysuria, frequency, hematuria, or incontinence  NEUROLOGICAL: No headaches,     Medications:  MEDICATIONS  (STANDING):  allopurinol 200 milliGRAM(s) Oral daily  aspirin  chewable 81 milliGRAM(s) Oral daily  buMETAnide 2 milliGRAM(s) Oral daily  dextrose 40% Gel 15 Gram(s) Oral once  dextrose 5%. 1000 milliLiter(s) (50 mL/Hr) IV Continuous <Continuous>  dextrose 5%. 1000 milliLiter(s) (100 mL/Hr) IV Continuous <Continuous>  dextrose 50% Injectable 25 Gram(s) IV Push once  dextrose 50% Injectable 12.5 Gram(s) IV Push once  dextrose 50% Injectable 25 Gram(s) IV Push once  diltiazem    milliGRAM(s) Oral daily  glucagon  Injectable 1 milliGRAM(s) IntraMuscular once  heparin  Infusion. 2000 Unit(s)/Hr (20 mL/Hr) IV Continuous <Continuous>  insulin glargine Injectable (LANTUS) 25 Unit(s) SubCutaneous at bedtime  insulin lispro (ADMELOG) corrective regimen sliding scale   SubCutaneous three times a day before meals  insulin lispro (ADMELOG) corrective regimen sliding scale   SubCutaneous at bedtime  insulin lispro Injectable (ADMELOG) 10 Unit(s) SubCutaneous three times a day before meals  metolazone 5 milliGRAM(s) Oral <User Schedule>  metoprolol succinate ER 50 milliGRAM(s) Oral two times a day  oxycodone    5 mG/acetaminophen 325 mG 1 Tablet(s) Oral every 12 hours  pregabalin 100 milliGRAM(s) Oral three times a day    MEDICATIONS  (PRN):  acetaminophen     Tablet .. 650 milliGRAM(s) Oral every 6 hours PRN Temp greater or equal to 38C (100.4F), Mild Pain (1 - 3)  heparin   Injectable 49316 Unit(s) IV Push every 6 hours PRN For aPTT less than 40  heparin   Injectable 5000 Unit(s) IV Push every 6 hours PRN For aPTT between 40 - 57    	    PHYSICAL EXAM:  T(C): 36.8 (10-24-21 @ 11:10), Max: 36.9 (10-23-21 @ 12:33)  HR: 74 (10-24-21 @ 11:10) (74 - 97)  BP: 144/75 (10-24-21 @ 11:10) (143/72 - 174/76)  RR: 18 (10-24-21 @ 11:10) (18 - 20)  SpO2: 96% (10-24-21 @ 11:10) (92% - 97%)  Wt(kg): --  I&O's Summary    23 Oct 2021 07:01  -  24 Oct 2021 07:00  --------------------------------------------------------  IN: 1200 mL / OUT: 3550 mL / NET: -2350 mL        Appearance: Normal	  HEENT:   Normal oral mucosa, PERRL, EOMI	    Cardiovascular: Normal S1 S2, No JVD,  Respiratory: dec bs   Psychiatry: A & O x 3,  Gastrointestinal:  Soft, Non-tender, + BS	    Neurologic: Non-focal  Extremities: pvd  TELEMETRY: 	    ECG:  	  RADIOLOGY:  OTHER: 	  	  LABS:	 	    CARDIAC MARKERS:                                15.0   9.08  )-----------( 200      ( 24 Oct 2021 06:38 )             48.6     10-24    136  |  96  |  51<H>  ----------------------------<  172<H>  3.7   |  25  |  1.88<H>    Ca    9.2      24 Oct 2021 06:38      proBNP:   Lipid Profile:   HgA1c:   TSH:

## 2021-10-24 NOTE — CONSULT NOTE ADULT - SUBJECTIVE AND OBJECTIVE BOX
[facility]  [phone]  NEPHROLOGY      INITIAL CONSULT NOTE  --------------------------------------------------------------------------------  HPI:     67yo gentleman with PMH of CAD and NSTEMI s/p 3 stents in 2014 to LAD, proximal and distal LCx, ischemic cardiomyopathy, htn, T2DM c/b peripheral neuropathy, CVA/TIA, atrial fibrillation on pradaxa, chronic RLE wound, left carotid stenosis s/p endarterectomy, gout who presents to the ED with complaint of lightheadedness and dizziness. Episodes of dizziness and lightheadedness occur intermittently, typically after dinner while he is sitting. He notes feeling "zoned out" and unsteady, and has had 2 episodes of near falls.  He denies any vertiginous dizziness or LOC. He had an episode again morning of admission after breakfast. Patient reports symptoms are similar to when he required his L CEA, however not as severe. Patient has not checked fingerstick BS during these episodes. He sometimes sees "white stars" during the episodes. He reports BP is usually well controlled. He has chronic neuropathy affecting lower extremities to knees bilaterally, denied  focal weakness. getting treated for chronic RLE wound which has been improving under the management of wound  care doctor.  now admitted and undergoing work up of his symptoms. also noticed with abnormal creatinine. renal consult called.       PAST HISTORY  --------------------------------------------------------------------------------  PAST MEDICAL & SURGICAL HISTORY:  HTN (hypertension), benign    HLD (hyperlipidemia)    DM type 2 (diabetes mellitus, type 2)    TIA (transient ischemic attack)    Atrial fibrillation    MI (myocardial infarction)  circa 2014    CAD (coronary artery disease)    Neuropathy    Status post angioplasty with stent  LULU x 3 2/7/2014    S/P carotid endarterectomy  left      FAMILY HISTORY:  Family history of heart disease  father    Family history of CVA  mother      PAST SOCIAL HISTORY:    no tobacco use     ALLERGIES & MEDICATIONS  --------------------------------------------------------------------------------  Allergies    No Known Allergies    Intolerances      Standing Inpatient Medications  allopurinol 200 milliGRAM(s) Oral daily  aspirin  chewable 81 milliGRAM(s) Oral daily  buMETAnide 2 milliGRAM(s) Oral daily  dextrose 40% Gel 15 Gram(s) Oral once  dextrose 5%. 1000 milliLiter(s) IV Continuous <Continuous>  dextrose 5%. 1000 milliLiter(s) IV Continuous <Continuous>  dextrose 50% Injectable 25 Gram(s) IV Push once  dextrose 50% Injectable 12.5 Gram(s) IV Push once  dextrose 50% Injectable 25 Gram(s) IV Push once  diltiazem    milliGRAM(s) Oral daily  glucagon  Injectable 1 milliGRAM(s) IntraMuscular once  heparin  Infusion. 2000 Unit(s)/Hr IV Continuous <Continuous>  insulin glargine Injectable (LANTUS) 25 Unit(s) SubCutaneous at bedtime  insulin lispro (ADMELOG) corrective regimen sliding scale   SubCutaneous three times a day before meals  insulin lispro (ADMELOG) corrective regimen sliding scale   SubCutaneous at bedtime  insulin lispro Injectable (ADMELOG) 10 Unit(s) SubCutaneous three times a day before meals  metolazone 5 milliGRAM(s) Oral <User Schedule>  metoprolol succinate ER 50 milliGRAM(s) Oral two times a day  oxycodone    5 mG/acetaminophen 325 mG 1 Tablet(s) Oral every 12 hours  pregabalin 100 milliGRAM(s) Oral three times a day    PRN Inpatient Medications  acetaminophen     Tablet .. 650 milliGRAM(s) Oral every 6 hours PRN  heparin   Injectable 02398 Unit(s) IV Push every 6 hours PRN  heparin   Injectable 5000 Unit(s) IV Push every 6 hours PRN      REVIEW OF SYSTEMS  --------------------------------------------------------------------------------  Gen: No  fevers/chills   Skin:  + bliste/ulder leg   Head/Eyes/Ears/Mouth: No headache; Normal hearing;  No sinus pain/discomfort, sore throat  Respiratory: No dyspnea, cough, wheezing, hemoptysis  CV: No chest pain, orthopnea pr palp   GI: No abdominal pain, diarrhea, nausea, vomiting, melena  : No dysuria, decrease urination or hesitancy urinating  hematuria, nocturia  MSK: No joint pain/swelling; no back pain  Neuro:  + intermittent  dizziness/lightheadedness, no  weakness,   Psych: No significant nervousness, anxiety or depression  also with no edema     All other systems were reviewed and are negative, except as noted.    VITALS/PHYSICAL EXAM  --------------------------------------------------------------------------------  T(C): 36.9 (10-24-21 @ 20:06), Max: 36.9 (10-24-21 @ 20:06)  HR: 95 (10-24-21 @ 19:02) (74 - 97)  BP: 155/82 (10-24-21 @ 19:02) (143/72 - 155/82)  RR: 18 (10-24-21 @ 20:06) (18 - 20)  SpO2: 96% (10-24-21 @ 20:06) (93% - 97%)  Wt(kg): --        10-23-21 @ 07:01  -  10-24-21 @ 07:00  --------------------------------------------------------  IN: 1200 mL / OUT: 3550 mL / NET: -2350 mL    10-24-21 @ 07:01  -  10-24-21 @ 22:01  --------------------------------------------------------  IN: 1200 mL / OUT: 1580 mL / NET: -380 mL      Physical Exam:  	Gen: Non toxic comfortable appearing   	no jvd   	Pulm: decrease bs  no rales or ronchi or wheezing  	CV: RRR, S1S2; no rub  	Back: No CVA tenderness  	Abd: +BS, soft, nontender/nondistended  	: No suprapubic tenderness  	UE: Warm, no cyanosis  no clubbing,  no edema  	LE: Warm, no cyanosis   + RLE ulcer with dressing + chronic stasis changes no edema  	Neuro: alert and oriented. speech coherent   	Psych: Normal affect and mood  	Skin: Warm, no decrease skin turgor   	    LABS/STUDIES  --------------------------------------------------------------------------------              15.0   9.08  >-----------<  200      [10-24-21 @ 06:38]              48.6     136  |  96  |  51  ----------------------------<  172      [10-24-21 @ 06:38]  3.7   |  25  |  1.88        Ca     9.2     [10-24-21 @ 06:38]        PTT: 60.7       [10-24-21 @ 09:09]      Creatinine Trend:  SCr 1.88 [10-24 @ 06:38]  SCr 1.78 [10-23 @ 06:36]  SCr 2.31 [10-21 @ 17:00]    Urinalysis - [08-05-20 @ 09:38]      Color Light Yellow / Appearance Clear / SG 1.015 / pH 6.0      Gluc 200 mg/dL / Ketone Negative  / Bili Negative / Urobili <2 mg/dL       Blood Small / Protein 100 mg/dL / Leuk Est Negative / Nitrite Negative      RBC 10 / WBC 1 / Hyaline 1 / Gran  / Sq Epi  / Non Sq Epi 0 / Bacteria Negative      HbA1c 11.7      [11-07-19 @ 09:14]    HBsAb Nonreact      [02-05-17 @ 11:45]  HBsAg Nonreact      [02-05-17 @ 11:45]  HCV 0.09, Nonreact      [02-03-17 @ 08:51]    dsDNA <12      [02-05-17 @ 11:45]  C3 Complement 123      [02-05-17 @ 11:45]  C4 Complement 33      [02-05-17 @ 11:45]  MPO-ANCA <5.0, interpretation: Negative      [02-05-17 @ 11:45]  PR3-ANCA <5.0, interpretation: Negative      [02-05-17 @ 11:45]  Free Light Chains: kappa 17.30, lambda 8.42, ratio = 2.05      [02-15 @ 07:29]  Immunofixation Serum:   Weak  IgG Kappa Band Identified    Reference Range: None Detected      [02-17-17 @ 07:53]  SPEP Interpretation: Weak Gamma-Migrating Paraprotein Identified      [02-17-17 @ 07:53]  Immunofixation Urine: No Monoclonal Band Identified      [02-18-17 @ 17:41]  UPEP Interpretation: Mild Selective (Glomerular) Proteinuria      [02-18-17 @ 17:41]

## 2021-10-24 NOTE — CONSULT NOTE ADULT - SUBJECTIVE AND OBJECTIVE BOX
Admitting Diagnosis:  Dizziness and giddiness [R42]  DIZZINESS AND GIDDINESS        HPI:  This is a 66y year old Male with the below past medical history who presents with the chief complaint of dizziness.  He hs hx of CAD and NSTEMI s/p 3 stents in 2014 to LAD, proximal and distal LCx, ischemic cardiomyopathy, htn, T2DM c/b peripheral neuropathy, CVA/TIA, atrial fibrillation on pradaxa, chronic RLE wound, left carotid stenosis s/p endarterectomy, gout who presents to the ED with complaint of lightheadedness and dizziness. Episodes of dizziness and lightheadedness occur intermittently, typically after dinner while he is sitting. He notes feeling "zoned out" and unsteady, and has had 2 episodes of near falls.  He denies any vertiginous dizziness or LOC. He had an episode again this morning after breakfast. Patient reports symptoms are similar to when he required his L CEA, however not as severe. Patient has not checked fingerstick BS during these episodes. He sometimes sees "white stars" during the episodes. He reports BP is usually well controlled. He has chronic neuropathy affecting lower extremities to knees bilaterally, but no focal weakness. Patient denies any fever, chills, nausea, vomiting. He also denies any CP or palpitations. He is currently getting treated for chronic RLE wound which has been improving under the management of wound  care doctor.  (21 Oct 2021 21:45)    He no longer feels dizzy in the hospital.  the dizziness was not present prior with lying down.    Past Medical History:  HTN (hypertension), benign [401.1]    HLD (hyperlipidemia) [272.4]    DM type 2 (diabetes mellitus, type 2) [250.00]    TIA (transient ischemic attack) [435.9]    Atrial fibrillation [427.31]    MI (myocardial infarction) [410.90]  circa 2014    CAD (coronary artery disease) [414.00]    Neuropathy [G62.9]        Past Surgical History:  Status post angioplasty with stent [V45.82]  LULU x 3 2/7/2014    S/P carotid endarterectomy [Z98.89]  left    S/P CABG (coronary artery bypass graft) [Z95.1]        Social History:  No toxic habits    Family History:  FAMILY HISTORY:  Family history of heart disease  father    Family history of CVA  mother        Allergies:  No Known Allergies      ROS:  Constitutional: Patient offers no complaints of fevers or significant weight loss  Ears, Nose, Mouth and Throat: The patient presents with no abnormalities of the head, ears, eyes, nose or throat  Skin: Patient offers no concerns of new rashes or lesions  Respiratory: The patient presents with no abnormalities of the respiratory tract  Cardiovascular: The patient presents with no cardiac abnormalities  Gastrointestinal: The patient presents with no abnormalities of the GI system  Genitourinary: The patient presents with no dysuria, hematuria or frequent urination  Neurological: See HPI  Endocrine: Patient offers no complaints of excessive thirst, urination, or heat/cold intolerance    Advanced care planning reviewed and noted in the chart.    Medications:  acetaminophen     Tablet .. 650 milliGRAM(s) Oral every 6 hours PRN  allopurinol 200 milliGRAM(s) Oral daily  aspirin  chewable 81 milliGRAM(s) Oral daily  buMETAnide 2 milliGRAM(s) Oral daily  dextrose 40% Gel 15 Gram(s) Oral once  dextrose 5%. 1000 milliLiter(s) IV Continuous <Continuous>  dextrose 5%. 1000 milliLiter(s) IV Continuous <Continuous>  dextrose 50% Injectable 25 Gram(s) IV Push once  dextrose 50% Injectable 12.5 Gram(s) IV Push once  dextrose 50% Injectable 25 Gram(s) IV Push once  diltiazem    milliGRAM(s) Oral daily  glucagon  Injectable 1 milliGRAM(s) IntraMuscular once  heparin   Injectable 41233 Unit(s) IV Push every 6 hours PRN  heparin   Injectable 5000 Unit(s) IV Push every 6 hours PRN  heparin  Infusion. 2000 Unit(s)/Hr IV Continuous <Continuous>  insulin glargine Injectable (LANTUS) 25 Unit(s) SubCutaneous at bedtime  insulin lispro (ADMELOG) corrective regimen sliding scale   SubCutaneous three times a day before meals  insulin lispro (ADMELOG) corrective regimen sliding scale   SubCutaneous at bedtime  insulin lispro Injectable (ADMELOG) 10 Unit(s) SubCutaneous three times a day before meals  metolazone 5 milliGRAM(s) Oral <User Schedule>  metoprolol succinate ER 50 milliGRAM(s) Oral two times a day  oxycodone    5 mG/acetaminophen 325 mG 1 Tablet(s) Oral every 12 hours  pregabalin 100 milliGRAM(s) Oral three times a day      Labs:  CBC Full  -  ( 24 Oct 2021 06:38 )  WBC Count : 9.08 K/uL  RBC Count : 5.93 M/uL  Hemoglobin : 15.0 g/dL  Hematocrit : 48.6 %  Platelet Count - Automated : 200 K/uL  Mean Cell Volume : 82.0 fl  Mean Cell Hemoglobin : 25.3 pg  Mean Cell Hemoglobin Concentration : 30.9 gm/dL  Auto Neutrophil # : x  Auto Lymphocyte # : x  Auto Monocyte # : x  Auto Eosinophil # : x  Auto Basophil # : x  Auto Neutrophil % : x  Auto Lymphocyte % : x  Auto Monocyte % : x  Auto Eosinophil % : x  Auto Basophil % : x    10-24    136  |  96  |  51<H>  ----------------------------<  172<H>  3.7   |  25  |  1.88<H>    Ca    9.2      24 Oct 2021 06:38      CAPILLARY BLOOD GLUCOSE      POCT Blood Glucose.: 182 mg/dL (24 Oct 2021 12:22)  POCT Blood Glucose.: 210 mg/dL (24 Oct 2021 07:53)  POCT Blood Glucose.: 201 mg/dL (23 Oct 2021 21:09)  POCT Blood Glucose.: 181 mg/dL (23 Oct 2021 16:46)      PTT - ( 24 Oct 2021 09:09 )  PTT:60.7 sec      Vitals:  Vital Signs Last 24 Hrs  T(C): 36.8 (24 Oct 2021 11:10), Max: 36.8 (24 Oct 2021 00:08)  T(F): 98.2 (24 Oct 2021 11:10), Max: 98.3 (24 Oct 2021 00:08)  HR: 74 (24 Oct 2021 11:10) (74 - 97)  BP: 144/75 (24 Oct 2021 11:10) (143/72 - 174/76)  BP(mean): --  RR: 18 (24 Oct 2021 11:10) (18 - 20)  SpO2: 96% (24 Oct 2021 11:10) (92% - 97%)    NEUROLOGICAL EXAM:    Mental status: Awake, alert, and in no apparent distress. Oriented to person, place and time. Language function is normal. Recent memory, digit span and concentration were normal.     Cranial Nerves: Pupils were equal, round, reactive to light. Extraocular movements were intact. Visual field were full. Fundoscopic exam was deferred. Facial sensation was intact to light touch. There was no facial asymmetry. The palate was upgoing symmetrically and tongue was midline. Hearing acuity was intact to finger rub AU. Shoulder shrug was full bilaterally    Motor exam: Bulk and tone were normal. Strength was 5/5 in all four extremities. Fine finger movements were symmetric and normal. There was no pronator drift    Reflexes: 1+ in the bilateral upper extremities. 1+ in the bilateral lower extremities. Toes were downgoing bilaterally.     Sensation: Intact to light touch, temperature, vibration and proprioception.     Coordination: Finger-nose-finger and heel-to-shin was without dysmetria.     Gait: deferred    Imaging:

## 2021-10-24 NOTE — CONSULT NOTE ADULT - ASSESSMENT
67yo gentleman with PMH of CAD and NSTEMI s/p 3 stents in 2014 to LAD, proximal and distal LCx, ischemic cardiomyopathy, htn, T2DM c/b peripheral neuropathy, CVA/TIA, atrial fibrillation on pradaxa, chronic RLE wound, left carotid stenosis s/p endarterectomy, gout who presents to the ED with complaint of lightheadedness and dizziness. Episodes of dizziness and lightheadedness occur intermittently,    1- CKD III   2- dizziness   3- hx DM   4- cad  5- a fib hx   6- chf  hx   7- hx gout       cr is baseline range.   tele monitor to r/o arrhythmia  cont dizziness work up   chf is compensated   bumex 2mg daily and cont with metolazone  check orthostatics  to have urinalysis   allopurinol 200 mg daily   cardizem cd 120 mg daily   to have urine pro/cr ratio

## 2021-10-25 NOTE — DISCHARGE NOTE PROVIDER - PROVIDER TOKENS
PROVIDER:[TOKEN:[[id number]]],PROVIDER:[TOKEN:[[id number]],FOLLOWUP:[1-3 days]],PROVIDER:[TOKEN:[[id number]],FOLLOWUP:[1 week]],PROVIDER:[TOKEN:[[id number]],FOLLOWUP:[1 week]]

## 2021-10-25 NOTE — DISCHARGE NOTE PROVIDER - NSDCCAREPROVSEEN_GEN_ALL_CORE_FT
Al Adelso, [facility], Mendoza Mazariegos, Colby Mazariegos, Magan Cordova, Logan Pinto, [facility]

## 2021-10-25 NOTE — DISCHARGE NOTE PROVIDER - HOSPITAL COURSE
66y year old Male of CAD and NSTEMI s/p 3 stents in 2014 to LAD, proximal and distal LCx, ischemic cardiomyopathy, HTN, T2DM c/b peripheral neuropathy, CVA/TIA, atrial fibrillation on Pradaxa, chronic RLE wound, left carotid stenosis s/p endarterectomy 2014, gout who presents to the ED with complaint of lightheadedness and dizziness. Episodes of dizziness and lightheadedness occur intermittently, typically after dinner while he is sitting. He notes feeling "zoned out" and unsteady, and has had 2 episodes of near sent in for high grade stenosis of the right carotid artery as outpatient    Right carotid stenosis  -Doppler reveals no significant stenosis  -Vascular input with no intervention planned at this time   -orthostatics neg   -MRA neck with Greater than 75% stenosis of the right distal common carotid artery. Approximately 60% stenosis of the proximal left internal carotid artery.  -Continue ASA and Statin Therapy  -No further neurological work up as inpatient  - Follow up with vascular - Dr. Pinto as outpatient for carotid  duplex close surveillance in 3 months in Jan 2022  - Follow up Neurology - Dr. Pearce    #CAD  -S/p PCI to LAD and LCX in 2014  -TTE 2019 shows mildly reduced LVEF 45%  -Continue BB, Asa, and Statin therapy  -Trop T elevated in setting of CKD  -EKG shows afib. No active ischemia noted.     #Ischemic cardiomyopathy  -Pt euvolemic on exam  -Continue BB, ASA, Statin  -cr overall stable cont Bumex and Metolazone M,W,F    #Afib  -rates stable cont bb  -cont pradaxa     #HTN  -BP overall acceptable - elevated in setting of steroids   -Started Lisinopril due to proteinuria  -Continue Metoprolol    #ANT on CKD 3  -Improving Creatine now at baseline  -Follow up Renal as outpatient    #Gout Flare - C/O Left knee pain with inability to walk  -s/p IV steriods x 1  - Now ambulating without pain    #DM2  - HbA1c 9.3  - Blood sugars elevated in the setting of steroid use  - follow up with PMD - Dr. Chin in 1 week    # Chronic Right leg wound - Wound and podiatry consulted  - Bilateral longstanding venous stasis, anterior R leg wound to subQ, stable, no drainage, no malodor, no acute signs of infection.  - Continue local wound care w/ aquacell and compression  - Follow up with Podiatry -  Dr. Ware after discharge for continued care.    Medically cleared by Dr. Corral to discharge patient 66y year old Male of CAD and NSTEMI s/p 3 stents in 2014 to LAD, proximal and distal LCx, ischemic cardiomyopathy, HTN, T2DM c/b peripheral neuropathy, CVA/TIA, atrial fibrillation on Pradaxa, chronic RLE wound, left carotid stenosis s/p endarterectomy 2014, gout who presents to the ED with complaint of lightheadedness and dizziness. Episodes of dizziness and lightheadedness occur intermittently, typically after dinner while he is sitting. He notes feeling "zoned out" and unsteady, and has had 2 episodes of near sent in for high grade stenosis of the right carotid artery as outpatient    Right carotid stenosis  -Doppler reveals no significant stenosis  -Vascular input with no intervention planned at this time   -orthostatics neg   -MRA neck with Greater than 75% stenosis of the right distal common carotid artery. Approximately 60% stenosis of the proximal left internal carotid artery.  -Continue ASA and Statin Therapy  -No further neurological work up as inpatient  - Follow up with vascular - Dr. Pinto as outpatient for carotid  duplex close surveillance in 3 months in Jan 2022  - Follow up Neurology - Dr. Pearce  Patient ambulated the hallway and did stairs with physical therapy with no reported Dizziness    #CAD  -S/p PCI to LAD and LCX in 2014  -TTE 2019 shows mildly reduced LVEF 45%  -Continue BB, Asa, and Statin therapy  -Trop T elevated in setting of CKD  -EKG shows afib. No active ischemia noted.     #Ischemic cardiomyopathy  -Pt euvolemic on exam  -Continue BB, ASA, Statin  -cr overall stable cont Bumex and Metolazone M,W,F    #Afib  -rates stable cont bb  -cont pradaxa     #HTN  -BP overall acceptable - elevated in setting of steroids   -Started Lisinopril due to proteinuria  -Continue Metoprolol    #ANT on CKD 3  -Improving Creatine now at baseline  -Follow up Renal as outpatient    #Gout Flare - C/O Left knee pain with inability to walk  -s/p IV steriods x 1  - Now ambulating without pain    #DM2  - HbA1c 9.3  - Blood sugars elevated in the setting of steroid use  - follow up with PMD - Dr. Chin in 1 week    # Chronic Right leg wound - Wound and podiatry consulted  - Bilateral longstanding venous stasis, anterior R leg wound to subQ, stable, no drainage, no malodor, no acute signs of infection.  - Continue local wound care w/ aquacell and compression  - Follow up with Podiatry -  Dr. Ware after discharge for continued care.    Medically cleared by Dr. Corral to discharge patient

## 2021-10-25 NOTE — DISCHARGE NOTE PROVIDER - CARE PROVIDERS DIRECT ADDRESSES
,[email or web address]_Unknown,DirectAddress_Unknown,DirectAddress_Unknown

## 2021-10-25 NOTE — PROGRESS NOTE ADULT - SUBJECTIVE AND OBJECTIVE BOX
CARDIOLOGY FOLLOW UP - Dr. Mazariegos  Date of Service: 10/25/21  CC: denies cp, sob, and palpitations, dizziness     Review of Systems:  Constitutional: No fever, weight loss, or fatigue  Respiratory: No cough, wheezing, or hemoptysis, no shortness of breath  Cardiovascular: No chest pain, palpitations, passing out, dizziness, or leg swelling  Gastrointestinal: No abd or epigastric pain.  No nausea, vomiting, or hematemesis; no diarrhea or constipation, no melena or hematochezia  Vascular: no edema       PHYSICAL EXAM:  T(C): 36.5 (10-25-21 @ 11:43), Max: 36.9 (10-24-21 @ 20:06)  HR: 66 (10-25-21 @ 11:43) (66 - 95)  BP: 134/62 (10-25-21 @ 11:43) (134/62 - 164/72)  RR: 17 (10-25-21 @ 11:43) (17 - 18)  SpO2: 95% (10-25-21 @ 11:43) (95% - 96%)  Wt(kg): --  I&O's Summary    24 Oct 2021 07:01  -  25 Oct 2021 07:00  --------------------------------------------------------  IN: 1356 mL / OUT: 2230 mL / NET: -874 mL    25 Oct 2021 07:01  -  25 Oct 2021 12:17  --------------------------------------------------------  IN: 560 mL / OUT: 600 mL / NET: -40 mL        Appearance: Normal	  Cardiovascular:  irreg No JVD, No murmurs  Respiratory: Lungs clear to auscultation	  Gastrointestinal:  Soft, Non-tender, + BS	  Extremities: Normal range of motion, No clubbing, cyanosis or edema      Home Medications:  allopurinol 100 mg oral tablet: 2 tab(s) orally once a day (21 Oct 2021 22:50)  aspirin 81 mg oral delayed release tablet: 1 tab(s) orally once a day (21 Oct 2021 22:50)  bumetanide 2 mg oral tablet: 1 tab(s) orally once a day (21 Oct 2021 22:50)  insulin glargine 100 units/mL subcutaneous solution: 25 unit(s) subcutaneous once a day (21 Oct 2021 22:50)  metOLazone 5 mg oral tablet: 1 tab(s) orally 3 times a week (21 Oct 2021 22:50)  metoprolol succinate 50 mg oral tablet, extended release: 1 tab(s) orally 2 times a day (21 Oct 2021 22:50)  NovoLOG 100 units/mL subcutaneous solution:  (21 Oct 2021 22:50)  oxycodone-acetaminophen 5 mg-325 mg oral tablet: 1 tab(s) orally 2 times a day (21 Oct 2021 22:50)  Pradaxa 150 mg oral capsule: 1 cap(s) orally 2 times a day (21 Oct 2021 22:50)  pregabalin 100 mg oral capsule: 1 cap(s) orally 3 times a day (21 Oct 2021 22:50)      MEDICATIONS  (STANDING):  allopurinol 200 milliGRAM(s) Oral daily  aspirin  chewable 81 milliGRAM(s) Oral daily  buMETAnide 2 milliGRAM(s) Oral daily  dextrose 40% Gel 15 Gram(s) Oral once  dextrose 5%. 1000 milliLiter(s) (50 mL/Hr) IV Continuous <Continuous>  dextrose 5%. 1000 milliLiter(s) (100 mL/Hr) IV Continuous <Continuous>  dextrose 50% Injectable 25 Gram(s) IV Push once  dextrose 50% Injectable 12.5 Gram(s) IV Push once  dextrose 50% Injectable 25 Gram(s) IV Push once  diltiazem    milliGRAM(s) Oral daily  glucagon  Injectable 1 milliGRAM(s) IntraMuscular once  heparin  Infusion. 2000 Unit(s)/Hr (20 mL/Hr) IV Continuous <Continuous>  insulin glargine Injectable (LANTUS) 25 Unit(s) SubCutaneous at bedtime  insulin lispro (ADMELOG) corrective regimen sliding scale   SubCutaneous three times a day before meals  insulin lispro (ADMELOG) corrective regimen sliding scale   SubCutaneous at bedtime  insulin lispro Injectable (ADMELOG) 10 Unit(s) SubCutaneous three times a day before meals  metolazone 5 milliGRAM(s) Oral <User Schedule>  metoprolol succinate ER 50 milliGRAM(s) Oral two times a day  oxycodone    5 mG/acetaminophen 325 mG 1 Tablet(s) Oral every 12 hours  pregabalin 100 milliGRAM(s) Oral three times a day      TELEMETRY: 	afib 80-100s     ECG:  	  RADIOLOGY:  < from: MR Angio Neck No Cont (10.24.21 @ 19:43) >  FINDINGS:  Please note the carotid origins are not included on the study.    Atherosclerotic plaque results in severe (greater than 75%) stenosis of the right distal common carotid artery. The right internal carotid artery is patent throughout its course.    Included portions of the left common carotid artery are without stenosis. Atherosclerotic plaque results in approximately 60% stenosis of the proximal left internal carotid artery.    The bilateral cervical vertebral arteries are patent throughout their course.    IMPRESSION:  -Greater than 75% stenosis of the right distal common carotid artery.    -Approximately 60% stenosis of the proximal left internal carotid artery.    Given the conflicting findings on comparison Doppler ultrasound, recommend CTA for more definitive evaluation.    _____________  NASCET criteria for internal carotid artery stenosis:  Mild:0% to 49%  Moderate: 50% to 69%  Severe: 70% to 99%  Complete Occlusion      < end of copied text >    DIAGNOSTIC TESTING:  [ ] Echocardiogram:  [ ]  Catheterization:  [ ] Stress Test:    OTHER: 	    LABS:	 	    Troponin T, High Sensitivity Result: 68 ng/L [0 - 51] (10-21 @ 21:34)  Troponin T, High Sensitivity Result: 79 ng/L [0 - 51] (10-21 @ 17:00)                          14.8   10.04 )-----------( 197      ( 25 Oct 2021 05:51 )             47.9     10-25    135  |  94<L>  |  52<H>  ----------------------------<  221<H>  3.6   |  23  |  1.97<H>    Ca    9.0      25 Oct 2021 05:49  Phos  3.9     10-25  Mg     1.9     10-25      PTT - ( 25 Oct 2021 05:51 )  PTT:42.9 sec

## 2021-10-25 NOTE — DISCHARGE NOTE PROVIDER - NSDCCPCAREPLAN_GEN_ALL_CORE_FT
PRINCIPAL DISCHARGE DIAGNOSIS  Diagnosis: Stenosis of right carotid artery  Assessment and Plan of Treatment: You presented with s to the ED with lightheadedness and dizziness - sent in for high grade stenosis of the right carotid artery as outpatient  -Carotid Doppler reveals no significant stenosis  -Vascular consulted - with no intervention planned at this time   -MRA neck with Greater than 75% stenosis of the right distal common carotid artery. Approximately 60% stenosis of the proximal left internal carotid artery.  -Continue Aspirin and Statin Therapy  -No further neurological work up as inpatient  - Follow up with vascular - Dr. Pinto as outpatient for carotid  duplex close surveillance in 3 months in Jan 2022  - Follow up Neurology - Dr. Pearce  - Seek medical for recurrence of symptoms        SECONDARY DISCHARGE DIAGNOSES  Diagnosis: Ischemic cardiomyopathy  Assessment and Plan of Treatment: Your fluid status is stable  -Continue metoprolol ASA, Statin  -cr overall stable cont Bumex and Metolazone M,W,F    Diagnosis: CAD (coronary artery disease)  Assessment and Plan of Treatment: #CAD  -S/p PCI to LAD and LCX in 2014  -TTE 2019 shows mildly reduced LVEF 45%  -Continue BB, Asa, and Statin therapy  -Trop T elevated in setting of CKD  -EKG shows afib. No active ischemia noted.       Diagnosis: Type 2 diabetes mellitus with peripheral neuropathy  Assessment and Plan of Treatment: - HbA1c 9.3  - Blood sugars elevated in the setting of steroid use  - follow up with PMD - Dr. Chin in 1 week  Make sure you get your HgA1c checked every three months.  If you take insulin, check your blood glucose before meals and at bedtime.  It's important not to skip any meals.  Keep a log of your blood glucose results and always take it with you to your doctor appointments.  Keep a list of your current medications including injectables and over the counter medications and bring this medication list with you to all your doctor appointments.  If you have not seen your ophthalmologist this year call for appointment.  Check your feet daily for redness, sores, or openings. Do not self treat. If no improvement in two days call your primary care physician for an appointment.  Low blood sugar (hypoglycemia) is a blood sugar below 70mg/dl. Check your blood sugar if you feel signs/symptoms of hypoglycemia. If your blood sugar is below 70 take 15 grams of carbohydrates (ex 4 oz of apple juice, 3-4 glucose tablets, or 4-6 oz of regular soda) wait 15 minutes and repeat blood sugar to make sure it comes up above 70.  If your blood sugar is above 70 and you are due for a meal, have a meal.  If you are not due for a meal have a snack.  This snack helps keeps your blood sugar at a safe range.      Diagnosis: Wound of right lower extremity  Assessment and Plan of Treatment:   # Chronic Right leg wound - Wound and podiatry consulted  - Bilateral longstanding venous stasis, anterior R leg wound to subQ, stable, no drainage, no malodor, no acute signs of infection.  - Continue local wound care w/ aquacell and compression  - Follow up with Podiatry -  Dr. Ware after discharge for continued care.    Diagnosis: Paroxysmal atrial fibrillation  Assessment and Plan of Treatment: Your Heart rates are stable   Continue Metoprolol  -continue pradaxa - blood thinners to prevent possible clot and stroke complications.   If any bleeding persistent and symptomatic stop the medication and notify your doctor immediately.  It helps if you control your blood pressure, not drink more than 1-2 alcohol drinks per day, cut down on caffeine, getting treatment for over active thyroid gland, and get regular exercise  Call your doctor if you feel your heart racing or beating unusually, chest tightness or pain, lightheaded, faint, shortness of breath especially with exercise  It is important to take your heart medication as prescribed      Diagnosis: Acute kidney injury superimposed on CKD  Assessment and Plan of Treatment: -Improving Creatine now at baseline  Avoid taking (NSAIDs) - (ex: Ibuprofen, Advil, Celebrex, Naprosyn)  Avoid taking any nephrotoxic agents (can harm kidneys) - Intravenous contrast for diagnostic testing, combination cold medications.  Have all medications adjusted for your renal function by your Health Care Provider.  Blood pressure control is important.  Take all medication as prescribed.      Diagnosis: Gout flare  Assessment and Plan of Treatment: C/O Left knee pain with inability to walk  -s/p IV steriods x 1  - Now ambulating without pain  -Continue allopurinol    Diagnosis: Hypertension  Assessment and Plan of Treatment: -BP overall acceptable - elevated in setting of steroids   -Started Lisinopril due to protein in your urine  -Continue Metoprolol

## 2021-10-25 NOTE — PROGRESS NOTE ADULT - ASSESSMENT
67 y/o male with a past medical history of HTN, DMT2 c/b LLE neuropathy, CVA/TIA, Afib (on Pradaxa , MI, CAD x 3 stents, chronic unhealing RLE wound s/p angio, left CEA (2014) presents with dizziness. outpatient with high grade stenosis of the right carotid artery.     PLAN:   - stable from vasc surg standpoint  MRA findings reviewed w pt and wife   carotid duplex reviewed  w pt and wife   pt cannot proceed a CTA due to renal insuff   neurology input reviewed  will d/w pt  to continue med/conserv management and  close outpt  rt  carotid surveillance  will d/w pt that his current sx  will most likely not be improved /resolved  by a  rt cea  and the indications for rt  cea will need to be high grade asympt   stenosis or sympt in the rt  [facility]  distribution  will follow

## 2021-10-25 NOTE — DISCHARGE NOTE PROVIDER - NSDCFUADDINST_GEN_ALL_CORE_FT
Podiatry Discharge Instructions:  Follow up: Please follow up with Dr. Nakul Ware within 1 week of discharge from the hospital, please call [phone] for appointment and discuss that you recently were seen in the hospital.  Wound Care: Please apply florentinell to anterior R leg followed by 4x4 lucas and shweta, ACE wrap to RLE, every other day.   Weight bearing: Please weight bear as tolerated

## 2021-10-25 NOTE — DISCHARGE NOTE PROVIDER - NSDCMRMEDTOKEN_GEN_ALL_CORE_FT
allopurinol 100 mg oral tablet: 2 tab(s) orally once a day  aspirin 81 mg oral delayed release tablet: 1 tab(s) orally once a day  bumetanide 2 mg oral tablet: 1 tab(s) orally once a day  dilTIAZem 120 mg/24 hours oral capsule, extended release: 1 cap(s) orally once a day  insulin glargine 100 units/mL subcutaneous solution: 25 unit(s) subcutaneous once a day  metOLazone 5 mg oral tablet: 1 tab(s) orally 3 times a week  metoprolol succinate 50 mg oral tablet, extended release: 1 tab(s) orally 2 times a day  NovoLOG 100 units/mL subcutaneous solution:   oxycodone-acetaminophen 5 mg-325 mg oral tablet: 1 tab(s) orally 2 times a day  Pradaxa 150 mg oral capsule: 1 cap(s) orally 2 times a day  pregabalin 100 mg oral capsule: 1 cap(s) orally 3 times a day   allopurinol 100 mg oral tablet: 2 tab(s) orally once a day  aspirin 81 mg oral delayed release tablet: 1 tab(s) orally once a day  bumetanide 2 mg oral tablet: 1 tab(s) orally once a day  dilTIAZem 120 mg/24 hours oral capsule, extended release: 1 cap(s) orally once a day  insulin glargine 100 units/mL subcutaneous solution: 25 unit(s) subcutaneous once a day  metOLazone 5 mg oral tablet: 1 tab(s) orally 3 times a week  metoprolol succinate 50 mg oral tablet, extended release: 1 tab(s) orally 2 times a day  NovoLOG FlexPen 100 units/mL injectable solution: 10 unit(s) subcutaneous 3 times a day  oxycodone-acetaminophen 5 mg-325 mg oral tablet: 1 tab(s) orally 2 times a day  Pradaxa 150 mg oral capsule: 1 cap(s) orally 2 times a day  pregabalin 100 mg oral capsule: 1 cap(s) orally 3 times a day   acetaminophen 325 mg oral tablet: 2 tab(s) orally every 6 hours, As needed, Temp greater or equal to 38C (100.4F), Mild Pain (1 - 3)  allopurinol 100 mg oral tablet: 2 tab(s) orally once a day  aspirin 81 mg oral delayed release tablet: 1 tab(s) orally once a day  bumetanide 2 mg oral tablet: 1 tab(s) orally once a day  dilTIAZem 120 mg/24 hours oral capsule, extended release: 1 cap(s) orally once a day  insulin glargine 100 units/mL subcutaneous solution: 29 unit(s) subcutaneous once a day  lisinopril 5 mg oral tablet: 1 tab(s) orally once a day  metOLazone 5 mg oral tablet: 1 tab(s) orally 3 times a week  metoprolol succinate 50 mg oral tablet, extended release: 1 tab(s) orally 2 times a day  NovoLOG FlexPen 100 units/mL injectable solution: 10 unit(s) subcutaneous 3 times a day  oxycodone-acetaminophen 5 mg-325 mg oral tablet: 1 tab(s) orally 2 times a day  Pradaxa 150 mg oral capsule: 1 cap(s) orally 2 times a day  pregabalin 100 mg oral capsule: 1 cap(s) orally 3 times a day

## 2021-10-25 NOTE — PROGRESS NOTE ADULT - TIME BILLING
Pt seen and examined, agree with the above assessment and plan by JOYCE Hansen.  CV stable  Doppler reveals no significant stenosis  MRA noted with Greater than 75% stenosis of the right distal common carotid artery. Approximately 60% stenosis of the proximal left internal carotid artery.  Given discrepant findings w doppler-CTA recommended  -Continue ASA and Statin Therapy  -vasc /neuro f/u   -Continue BB, Asa, and Statin therapy  -cr overall stable cont Bumex and Metolazone M,W,F  - cont hep gtt for now Pt seen and examined, agree with the above assessment and plan by JOYCE Hansen.  CV stable  Doppler reveals no significant stenosis  MRA noted with Greater than 75% stenosis of the right distal common carotid artery. Approximately 60% stenosis of the proximal left internal carotid artery consistent w outr office fiundings  Please ask vascular to reevaluate given MR findings, history and symptoms  Continue ASA and Statin Therapy  cr overall stable cont Bumex and Metolazone M,W,F  cont hep gtt for now

## 2021-10-25 NOTE — PROGRESS NOTE ADULT - SUBJECTIVE AND OBJECTIVE BOX
[facility]   [phone]  RENAL FOLLOW UP NOTE  --------------------------------------------------------------------------------  Chief Complaint:    24 hour events/subjective:    patient seen and examined.   +orthopnea    PAST HISTORY  --------------------------------------------------------------------------------  No significant changes to PMH, PSH, FHx, SHx, unless otherwise noted    ALLERGIES & MEDICATIONS  --------------------------------------------------------------------------------  Allergies    No Known Allergies    Intolerances      Standing Inpatient Medications  allopurinol 200 milliGRAM(s) Oral daily  aspirin  chewable 81 milliGRAM(s) Oral daily  buMETAnide 2 milliGRAM(s) Oral daily  dextrose 40% Gel 15 Gram(s) Oral once  dextrose 5%. 1000 milliLiter(s) IV Continuous <Continuous>  dextrose 5%. 1000 milliLiter(s) IV Continuous <Continuous>  dextrose 50% Injectable 25 Gram(s) IV Push once  dextrose 50% Injectable 12.5 Gram(s) IV Push once  dextrose 50% Injectable 25 Gram(s) IV Push once  diltiazem    milliGRAM(s) Oral daily  glucagon  Injectable 1 milliGRAM(s) IntraMuscular once  heparin  Infusion. 2000 Unit(s)/Hr IV Continuous <Continuous>  insulin glargine Injectable (LANTUS) 25 Unit(s) SubCutaneous at bedtime  insulin lispro (ADMELOG) corrective regimen sliding scale   SubCutaneous three times a day before meals  insulin lispro (ADMELOG) corrective regimen sliding scale   SubCutaneous at bedtime  insulin lispro Injectable (ADMELOG) 10 Unit(s) SubCutaneous three times a day before meals  metolazone 5 milliGRAM(s) Oral <User Schedule>  metoprolol succinate ER 50 milliGRAM(s) Oral two times a day  oxycodone    5 mG/acetaminophen 325 mG 1 Tablet(s) Oral every 12 hours  pregabalin 100 milliGRAM(s) Oral three times a day    PRN Inpatient Medications  acetaminophen     Tablet .. 650 milliGRAM(s) Oral every 6 hours PRN  heparin   Injectable 47453 Unit(s) IV Push every 6 hours PRN  heparin   Injectable 5000 Unit(s) IV Push every 6 hours PRN      REVIEW OF SYSTEMS  --------------------------------------------------------------------------------    Gen: denies fevers/chills,  CVS: denies chest pain/palpitations  Resp: +sob when flat. denies Cough  GI: Denies N/V/Abd pain  : Denies dysuria/oliguria/hematuria    All other systems were reviewed and are negative, except as noted.    VITALS/PHYSICAL EXAM  --------------------------------------------------------------------------------  T(C): 36.5 (10-25-21 @ 11:43), Max: 36.9 (10-24-21 @ 20:06)  HR: 66 (10-25-21 @ 11:43) (66 - 95)  BP: 134/62 (10-25-21 @ 11:43) (134/62 - 164/72)  RR: 17 (10-25-21 @ 11:43) (17 - 18)  SpO2: 95% (10-25-21 @ 11:43) (95% - 96%)  Wt(kg): --        10-24-21 @ 07:01  -  10-25-21 @ 07:00  --------------------------------------------------------  IN: 1356 mL / OUT: 2230 mL / NET: -874 mL    10-25-21 @ 07:01  -  10-25-21 @ 13:48  --------------------------------------------------------  IN: 816 mL / OUT: 600 mL / NET: 216 mL      Physical Exam:  	  	Gen: Non toxic comfortable appearing   	Pulm: decrease breath sounds  no rales or ronchi or wheezing  	CV: Unable to determine JVD. RRR, S1S2;   	Abd: +BS, soft, nontender/nondistended  	: No suprapubic tenderness  	UE: Warm, no cyanosis  no clubbing,  no edema  	LE: Warm, no cyanosis, no edema,  + RLE ulcer with dressing + chronic stasis changes  	Neuro: alert and oriented. speech coherent   	Skin: Warm, no decrease skin turgor     LABS/STUDIES  --------------------------------------------------------------------------------              14.8   10.04 >-----------<  197      [10-25-21 @ 05:51]              47.9     135  |  94  |  52  ----------------------------<  221      [10-25-21 @ 05:49]  3.6   |  23  |  1.97        Ca     9.0     [10-25-21 @ 05:49]      Mg     1.9     [10-25-21 @ 05:49]      Phos  3.9     [10-25-21 @ 05:49]        PTT: 42.9       [10-25-21 @ 05:51]      Creatinine Trend:  SCr 1.97 [10-25 @ 05:49]  SCr 1.88 [10-24 @ 06:38]  SCr 1.78 [10-23 @ 06:36]  SCr 2.31 [10-21 @ 17:00]              Urinalysis - [10-24-21 @ 23:56]      Color Light Yellow / Appearance Clear / SG 1.012 / pH 6.0      Gluc 100 mg/dL / Ketone Negative  / Bili Negative / Urobili Negative       Blood Small / Protein 100 mg/dL / Leuk Est Negative / Nitrite Negative      RBC 4 / WBC 1 / Hyaline 7 / Gran  / Sq Epi  / Non Sq Epi 0 / Bacteria Negative    Urine Creatinine 56      [10-24-21 @ 23:56]  Urine Protein 185      [10-24-21 @ 23:56]    HbA1c 11.7      [11-07-19 @ 09:14]

## 2021-10-25 NOTE — PROGRESS NOTE ADULT - ASSESSMENT
This is a 66y Male, here with dizziness, more lightheaded and not vertigo.  Seems to be orthostatic by history.    Labs show some ANT, as per medicine team      Also likely has autonomic component of neuropathy with orthostatic hypotension    Lyrcia/opiates with ANT/CKD can contribute    Doubt TIA/CVA.  Doubt role of stenosis of cerebrovasculature.  carotid doppler showed no significant stenosis    neuro appears stable    If MRA unremarkable, no further neuro eval as no evidence of new cerebrovascular disease    neuro stable

## 2021-10-25 NOTE — DISCHARGE NOTE PROVIDER - NSDCHHHOMEBOUNDOTHER_GEN_ALL_CORE_FT
Please apply aquacell to anterior R leg followed by 4x4 chrise and shweta ACE wrap to RLE, every other day.

## 2021-10-25 NOTE — PROGRESS NOTE ADULT - SUBJECTIVE AND OBJECTIVE BOX
Admitting Diagnosis:  Dizziness and giddiness [R42]  DIZZINESS AND GIDDINESS        HPI:  This is a 66y year old Male with the below past medical history who presents with the chief complaint of dizziness.  He hs hx of CAD and NSTEMI s/p 3 stents in  to LAD, proximal and distal LCx, ischemic cardiomyopathy, htn, T2DM c/b peripheral neuropathy, CVA/TIA, atrial fibrillation on pradaxa, chronic RLE wound, left carotid stenosis s/p endarterectomy, gout who presents to the ED with complaint of lightheadedness and dizziness. Episodes of dizziness and lightheadedness occur intermittently, typically after dinner while he is sitting. He notes feeling "zoned out" and unsteady, and has had 2 episodes of near falls.  He denies any vertiginous dizziness or LOC. He had an episode again this morning after breakfast. Patient reports symptoms are similar to when he required his L CEA, however not as severe. Patient has not checked fingerstick BS during these episodes. He sometimes sees "white stars" during the episodes. He reports BP is usually well controlled. He has chronic neuropathy affecting lower extremities to knees bilaterally, but no focal weakness. Patient denies any fever, chills, nausea, vomiting. He also denies any CP or palpitations. He is currently getting treated for chronic RLE wound which has been improving under the management of wound  care doctor.  (21 Oct 2021 21:45)    now feels better no focal sx    Past Medical History:  HTN (hypertension), benign [401.1]    HLD (hyperlipidemia) [272.4]    DM type 2 (diabetes mellitus, type 2) [250.00]    TIA (transient ischemic attack) [435.9]    Atrial fibrillation [427.31]    MI (myocardial infarction) [410.90]  circa     CAD (coronary artery disease) [414.00]    Neuropathy [G62.9]        Past Surgical History:  Status post angioplasty with stent [V45.82]  LULU x 3 2014    S/P carotid endarterectomy [Z98.89]  left    S/P CABG (coronary artery bypass graft) [Z95.1]        Social History:  No toxic habits    Family History:  FAMILY HISTORY:  Family history of heart disease  father    Family history of CVA  mother        Allergies:  No Known Allergies      ROS:  Constitutional: Patient offers no complaints of fevers or significant weight loss  Ears, Nose, Mouth and Throat: The patient presents with no abnormalities of the head, ears, eyes, nose or throat  Skin: Patient offers no concerns of new rashes or lesions  Respiratory: The patient presents with no abnormalities of the respiratory tract  Cardiovascular: The patient presents with no cardiac abnormalities  Gastrointestinal: The patient presents with no abnormalities of the GI system  Genitourinary: The patient presents with no dysuria, hematuria or frequent urination  Neurological: See HPI  Endocrine: Patient offers no complaints of excessive thirst, urination, or heat/cold intolerance    Advanced care planning reviewed and noted in the chart.          Medications:  acetaminophen     Tablet .. 650 milliGRAM(s) Oral every 6 hours PRN  allopurinol 200 milliGRAM(s) Oral daily  aspirin  chewable 81 milliGRAM(s) Oral daily  buMETAnide 2 milliGRAM(s) Oral daily  dextrose 40% Gel 15 Gram(s) Oral once  dextrose 5%. 1000 milliLiter(s) IV Continuous <Continuous>  dextrose 5%. 1000 milliLiter(s) IV Continuous <Continuous>  dextrose 50% Injectable 25 Gram(s) IV Push once  dextrose 50% Injectable 12.5 Gram(s) IV Push once  dextrose 50% Injectable 25 Gram(s) IV Push once  diltiazem    milliGRAM(s) Oral daily  glucagon  Injectable 1 milliGRAM(s) IntraMuscular once  heparin   Injectable 59152 Unit(s) IV Push every 6 hours PRN  heparin   Injectable 5000 Unit(s) IV Push every 6 hours PRN  heparin  Infusion. 2000 Unit(s)/Hr IV Continuous <Continuous>  insulin glargine Injectable (LANTUS) 25 Unit(s) SubCutaneous at bedtime  insulin lispro (ADMELOG) corrective regimen sliding scale   SubCutaneous three times a day before meals  insulin lispro (ADMELOG) corrective regimen sliding scale   SubCutaneous at bedtime  insulin lispro Injectable (ADMELOG) 10 Unit(s) SubCutaneous three times a day before meals  metolazone 5 milliGRAM(s) Oral <User Schedule>  metoprolol succinate ER 50 milliGRAM(s) Oral two times a day  oxycodone    5 mG/acetaminophen 325 mG 1 Tablet(s) Oral every 12 hours  pregabalin 100 milliGRAM(s) Oral three times a day      Labs:  CBC Full  -  ( 25 Oct 2021 05:51 )  WBC Count : 10.04 K/uL  RBC Count : 5.88 M/uL  Hemoglobin : 14.8 g/dL  Hematocrit : 47.9 %  Platelet Count - Automated : 197 K/uL  Mean Cell Volume : 81.5 fl  Mean Cell Hemoglobin : 25.2 pg  Mean Cell Hemoglobin Concentration : 30.9 gm/dL  Auto Neutrophil # : x  Auto Lymphocyte # : x  Auto Monocyte # : x  Auto Eosinophil # : x  Auto Basophil # : x  Auto Neutrophil % : x  Auto Lymphocyte % : x  Auto Monocyte % : x  Auto Eosinophil % : x  Auto Basophil % : x    10-25    135  |  94<L>  |  52<H>  ----------------------------<  221<H>  3.6   |  23  |  1.97<H>    Ca    9.0      25 Oct 2021 05:49  Phos  3.9     10-25  Mg     1.9     10-25      CAPILLARY BLOOD GLUCOSE      POCT Blood Glucose.: 210 mg/dL (25 Oct 2021 07:24)  POCT Blood Glucose.: 257 mg/dL (24 Oct 2021 21:25)  POCT Blood Glucose.: 194 mg/dL (24 Oct 2021 16:36)  POCT Blood Glucose.: 182 mg/dL (24 Oct 2021 12:22)      PTT - ( 25 Oct 2021 05:51 )  PTT:42.9 sec  Urinalysis Basic - ( 24 Oct 2021 23:56 )    Color: Light Yellow / Appearance: Clear / S.012 / pH: x  Gluc: x / Ketone: Negative  / Bili: Negative / Urobili: Negative   Blood: x / Protein: 100 mg/dL / Nitrite: Negative   Leuk Esterase: Negative / RBC: 4 /hpf / WBC 1 /HPF   Sq Epi: x / Non Sq Epi: 0 /hpf / Bacteria: Negative          Vitals:  Vital Signs Last 24 Hrs  T(C): 36.7 (25 Oct 2021 04:50), Max: 36.9 (24 Oct 2021 20:06)  T(F): 98.1 (25 Oct 2021 04:50), Max: 98.5 (24 Oct 2021 20:06)  HR: 83 (25 Oct 2021 04:50) (74 - 95)  BP: 164/72 (25 Oct 2021 04:50) (144/70 - 164/72)  BP(mean): --  RR: 18 (25 Oct 2021 04:50) (18 - 18)  SpO2: 95% (25 Oct 2021 04:50) (95% - 96%)    NEUROLOGICAL EXAM:    Mental status: Awake, alert, and in no apparent distress. Oriented to person, place and time. Language function is normal. Recent memory, digit span and concentration were normal.     Cranial Nerves: Pupils were equal, round, reactive to light. Extraocular movements were intact. Visual field were full.  Facial sensation was intact to light touch. There was no facial asymmetry. The palate was upgoing symmetrically and tongue was midline. Hearing acuity was intact to finger rub AU. Shoulder shrug was full bilaterally    Motor exam: Bulk and tone were normal. Strength was 5/5 in all four extremities. Fine finger movements were symmetric and normal. There was no pronator drift    Reflexes: 1+ in the bilateral upper extremities. 1+ in the bilateral lower extremities. Toes were downgoing bilaterally.     Sensation: Intact to light touch, temperature, vibration and proprioception.     Coordination: Finger-nose-finger and heel-to-shin was without dysmetria.     Gait: deferred    Imaging:    < from: [facility] Duplex CarotidHumera (10.22.21 @ 15:51) >    EXAM:  CAROTID DUPLEX BILATERAL                            PROCEDURE DATE:  10/22/2021            INTERPRETATION:  CLINICAL INFORMATION: Left carotid endarterectomy.    COMPARISON: 2014.    TECHNIQUE: Grayscale, color and spectral Doppler examination of both carotid arteries was performed.    FINDINGS:    No elevated velocities or abnormal waveforms are encountered.    Peak systolic velocities are as follows:    RIGHT:  PROX CCA = 29 cm/s  DIST CCA = 51 cm/s  PROX ICA = 101 cm/s  DIST ICA = 92 cm/s  ECA = 45 cm/s    LEFT:  PROX CCA = 100 cm/s  DIST CCA = 74 cm/s  PROX ICA = 26 cm/s  DIST ICA = 24 cm/s  ECA = 180 cm/s    Right vertebral artery is not visualized. Antegrade flow is noted within the left vertebral artery.    IMPRESSION: No significant hemodynamic stenosis of either internal carotid artery.  Elevated velocities in the left external carotid artery, possibly mild stenosis.    Measurement of carotid stenosis is based on velocity parameters that correlate the residual internal carotid diameter with that of the more distal vessel in accordance with a method such as the [place] [employer] Symptomatic Carotid Endarterectomy Trial (NASCET).    --- End of Report ---                KIRK REGLAADO MD; Attending Radiologist  This document has been electronically signed. Oct 22 2021  4:45PM    < end of copied text >

## 2021-10-25 NOTE — PROGRESS NOTE ADULT - SUBJECTIVE AND OBJECTIVE BOX
DATE OF SERVICE: 10-25-21 @ 15:30  CHIEF COMPLAINT:Patient is a 66y old  Male who presents with a chief complaint of dizziness and lightheadedness (25 Oct 2021 13:48)    	        PAST MEDICAL & SURGICAL HISTORY:  HTN (hypertension), benign    HLD (hyperlipidemia)    DM type 2 (diabetes mellitus, type 2)    TIA (transient ischemic attack)    Atrial fibrillation    MI (myocardial infarction)  circa 2014    CAD (coronary artery disease)    Neuropathy    Status post angioplasty with stent  LULU x 3 2/7/2014    S/P carotid endarterectomy  left            REVIEW OF SYSTEMS:  feels better  RESPIRATORY: No cough, wheezing, chills or hemoptysis; No Shortness of Breath  CARDIOVASCULAR: No chest pain, palpitations, passing out, dizziness, or leg swelling  GASTROINTESTINAL: No abdominal or epigastric pain. No nausea, vomiting, or hematemesis; No diarrhea or constipation. No melena or hematochezia.  GENITOURINARY: No dysuria, frequency, hematuria,  NEUROLOGICAL: No headaches, memory loss, loss of strength, numbness, or tremors  SKIN: No itching, burning, rashes, or lesions   LYMPH Nodes: No enlarged glands  ENDOCRINE: No heat or cold intolerance; No hair loss  MUSCULOSKELETAL: No joint pain or swelling; No muscle, back, or extremity pain    Medications:  MEDICATIONS  (STANDING):  allopurinol 200 milliGRAM(s) Oral daily  aspirin  chewable 81 milliGRAM(s) Oral daily  buMETAnide 2 milliGRAM(s) Oral daily  dextrose 40% Gel 15 Gram(s) Oral once  dextrose 5%. 1000 milliLiter(s) (50 mL/Hr) IV Continuous <Continuous>  dextrose 5%. 1000 milliLiter(s) (100 mL/Hr) IV Continuous <Continuous>  dextrose 50% Injectable 25 Gram(s) IV Push once  dextrose 50% Injectable 12.5 Gram(s) IV Push once  dextrose 50% Injectable 25 Gram(s) IV Push once  diltiazem    milliGRAM(s) Oral daily  glucagon  Injectable 1 milliGRAM(s) IntraMuscular once  heparin  Infusion. 2000 Unit(s)/Hr (20 mL/Hr) IV Continuous <Continuous>  insulin glargine Injectable (LANTUS) 25 Unit(s) SubCutaneous at bedtime  insulin lispro (ADMELOG) corrective regimen sliding scale   SubCutaneous three times a day before meals  insulin lispro (ADMELOG) corrective regimen sliding scale   SubCutaneous at bedtime  insulin lispro Injectable (ADMELOG) 10 Unit(s) SubCutaneous three times a day before meals  metolazone 5 milliGRAM(s) Oral <User Schedule>  metoprolol succinate ER 50 milliGRAM(s) Oral two times a day  oxycodone    5 mG/acetaminophen 325 mG 1 Tablet(s) Oral every 12 hours  pregabalin 100 milliGRAM(s) Oral three times a day    MEDICATIONS  (PRN):  acetaminophen     Tablet .. 650 milliGRAM(s) Oral every 6 hours PRN Temp greater or equal to 38C (100.4F), Mild Pain (1 - 3)  heparin   Injectable 60769 Unit(s) IV Push every 6 hours PRN For aPTT less than 40  heparin   Injectable 5000 Unit(s) IV Push every 6 hours PRN For aPTT between 40 - 57    	    PHYSICAL EXAM:  T(C): 36.5 (10-25-21 @ 11:43), Max: 36.9 (10-24-21 @ 20:06)  HR: 66 (10-25-21 @ 11:43) (66 - 95)  BP: 134/62 (10-25-21 @ 11:43) (134/62 - 164/72)  RR: 17 (10-25-21 @ 11:43) (17 - 18)  SpO2: 95% (10-25-21 @ 11:43) (95% - 96%)  Wt(kg): --  I&O's Summary    24 Oct 2021 07:01  -  25 Oct 2021 07:00  --------------------------------------------------------  IN: 1356 mL / OUT: 2230 mL / NET: -874 mL    25 Oct 2021 07:01  -  25 Oct 2021 15:30  --------------------------------------------------------  IN: 816 mL / OUT: 1000 mL / NET: -184 mL        Appearance: Normal	  HEENT:   Normal oral mucosa, PERRL, EOMI	  Lymphatic: No lymphadenopathy  Cardiovascular: Normal S1 S2, No JVD,   Respiratory: Lungs clear to auscultation	  Psychiatry: A & O  Gastrointestinal:  Soft, Non-tender, + BS	  	  Neurologic: Non-focal  Extremities:pvd    TELEMETRY: 	    ECG:  	  RADIOLOGY:  OTHER: 	  	  LABS:	 	    CARDIAC MARKERS:                                14.8   10.04 )-----------( 197      ( 25 Oct 2021 05:51 )             47.9     10-25    135  |  94<L>  |  52<H>  ----------------------------<  221<H>  3.6   |  23  |  1.97<H>    Ca    9.0      25 Oct 2021 05:49  Phos  3.9     10-25  Mg     1.9     10-25      proBNP: Serum Pro-Brain Natriuretic Peptide: 2126 pg/mL (10-25 @ 05:49)    Lipid Profile:   HgA1c:   TSH:

## 2021-10-25 NOTE — DISCHARGE NOTE PROVIDER - CARE PROVIDER_API CALL
Edward Pinto)  Vascular Surgery  [address], [address]  [place] [address]  Phone: [phone]  Fax: [phone]  Follow Up Time:     Yayo Gallegos  INTERNAL MEDICINE  [address], [address]  [place] [address]  Phone: [phone]  Fax: [phone]  Follow Up Time: 1-3 days    Colby Mazariegos (MD)  Cardiovascular Disease; Interventional Cardiology; Nuclear Cardiology  [address], [address]  [place] [address]  Phone: [phone]  Fax: [phone]  Follow Up Time: 1 week    Nakul Ware (DP)  Surgery  [address] [facility] [facility], [place] [address]  Phone: [phone]  Fax: [phone]  Follow Up Time: 1 week

## 2021-10-25 NOTE — PROGRESS NOTE ADULT - ASSESSMENT
a/p   65 y/o male with a past medical history of HTN, DMT2 c/b LLE neuropathy, CVA/TIA, Afib (on Pradaxa , MI, CAD (NSTEMI) x 3 stents in 2014 to LAD, and proximal and distal LCx, ischemic cardiomyopathy, chronic unhealing RLE wound s/p angio, left CEA (2014) presents with dizziness      #R carotid stenosis  -Doppler reveals no significant stenosis  -Vascular input noted: no intervention planned at this time  -orthostatics neg   -MRA noted with Greater than 75% stenosis of the right distal common carotid artery. Approximately 60% stenosis of the proximal left internal carotid artery.  -Continue ASA and Statin Therapy  -vasc /neuro f/u     #CAD  -S/p PCI to LAD and LCX in 2014  -TTE 2019 shows mildly reduced LVEF 45%  -Continue BB, Asa, and Statin therapy  -Trop T elevated in setting of CKD  -EKG shows afib. No active ischemia noted.     #Ischemic cardiomyopathy  -Pt euvolemic on exam  -Continue BB, ASA, Statin  -cr overall stable cont Bumex and Metolazone M,W,F    #HTN  -BP acceptable  -c/w current meds      #DM2  -ISS  -Management as per primary team    #CKD  -Improving  -Management as per primary team    #Afib  -rates stable cont bb  -Pt on Pradaxa at home- cont hep gtt for now     dvt ppx  plan discussed with ACP

## 2021-10-25 NOTE — PROGRESS NOTE ADULT - ASSESSMENT
67yo gentleman with PMH of CAD and NSTEMI s/p 3 stents in 2014 to LAD, proximal and distal LCx, ischemic cardiomyopathy, htn, T2DM c/b peripheral neuropathy, CVA/TIA, atrial fibrillation on pradaxa, chronic RLE wound, left carotid stenosis s/p endarterectomy, gout who presents to the ED with complaint of lightheadedness and dizziness. Episodes of dizziness and lightheadedness occur intermittently,    1- CKD III   2- dizziness   3- hx DM   4- cad  5- a fib hx   6- chf  hx   7- hx gout       creatinine is baseline range.   no events on tele  orthostatics negative  continue bumex 2mg daily and cont with metolazone  pro-bnp added to morning blood work  allopurinol 200 mg daily   cardizem cd 120 mg daily   strict I/O  monitor daily weight.  monitor creatinine and electrolytes daily

## 2021-10-25 NOTE — PROGRESS NOTE ADULT - SUBJECTIVE AND OBJECTIVE BOX
Patient is a 66y old  Male who presents with a chief complaint of dizziness and lightheadedness (25 Oct 2021 16:10)      Vascular Surgery Attending Progress Note    Interval HPI: pt states no new c/o     Medications:  acetaminophen     Tablet .. 650 milliGRAM(s) Oral every 6 hours PRN  allopurinol 200 milliGRAM(s) Oral daily  aspirin  chewable 81 milliGRAM(s) Oral daily  buMETAnide 2 milliGRAM(s) Oral daily  dextrose 40% Gel 15 Gram(s) Oral once  dextrose 5%. 1000 milliLiter(s) IV Continuous <Continuous>  dextrose 5%. 1000 milliLiter(s) IV Continuous <Continuous>  dextrose 50% Injectable 25 Gram(s) IV Push once  dextrose 50% Injectable 12.5 Gram(s) IV Push once  dextrose 50% Injectable 25 Gram(s) IV Push once  diltiazem    milliGRAM(s) Oral daily  glucagon  Injectable 1 milliGRAM(s) IntraMuscular once  heparin   Injectable 72907 Unit(s) IV Push every 6 hours PRN  heparin   Injectable 5000 Unit(s) IV Push every 6 hours PRN  heparin  Infusion. 2000 Unit(s)/Hr IV Continuous <Continuous>  insulin glargine Injectable (LANTUS) 25 Unit(s) SubCutaneous at bedtime  insulin lispro (ADMELOG) corrective regimen sliding scale   SubCutaneous three times a day before meals  insulin lispro (ADMELOG) corrective regimen sliding scale   SubCutaneous at bedtime  insulin lispro Injectable (ADMELOG) 10 Unit(s) SubCutaneous three times a day before meals  metolazone 5 milliGRAM(s) Oral <User Schedule>  metoprolol succinate ER 50 milliGRAM(s) Oral two times a day  oxycodone    5 mG/acetaminophen 325 mG 1 Tablet(s) Oral every 12 hours  pregabalin 100 milliGRAM(s) Oral three times a day      Vital Signs Last 24 Hrs  T(C): 36.5 (25 Oct 2021 11:43), Max: 36.9 (24 Oct 2021 20:06)  T(F): 97.7 (25 Oct 2021 11:43), Max: 98.5 (24 Oct 2021 20:06)  HR: 98 (25 Oct 2021 17:05) (66 - 98)  BP: 158/84 (25 Oct 2021 17:05) (134/62 - 164/72)  BP(mean): 108 (25 Oct 2021 17:05) (108 - 108)  RR: 17 (25 Oct 2021 11:43) (17 - 18)  SpO2: 95% (25 Oct 2021 11:43) (95% - 96%)  I&O's Summary    24 Oct 2021 07:01  -  25 Oct 2021 07:00  --------------------------------------------------------  IN: 1356 mL / OUT: 2230 mL / NET: -874 mL    25 Oct 2021 07:01  -  25 Oct 2021 18:51  --------------------------------------------------------  IN: 1056 mL / OUT: 1000 mL / NET: 56 mL        Physical Exam:  Neuro  A&Ox3 VSS  Vascular:  no acute changes     LABS:                        14.8   10.04 )-----------( 197      ( 25 Oct 2021 05:51 )             47.9     10-25    135  |  94<L>  |  52<H>  ----------------------------<  221<H>  3.6   |  23  |  1.97<H>    Ca    9.0      25 Oct 2021 05:49  Phos  3.9     10-25  Mg     1.9     10-25      PTT - ( 25 Oct 2021 13:57 )  PTT:73.9 sec        UMAIR MCGRAW MD  [phone] [phone]

## 2021-10-26 NOTE — PROGRESS NOTE ADULT - ASSESSMENT
a/p   65 y/o male with a past medical history of HTN, DMT2 c/b LLE neuropathy, CVA/TIA, Afib (on Pradaxa , MI, CAD (NSTEMI) x 3 stents in 2014 to LAD, and proximal and distal LCx, ischemic cardiomyopathy, chronic unhealing RLE wound s/p angio, left CEA (2014) presents with dizziness      #R carotid stenosis  -Doppler reveals no significant stenosis  -Vascular input noted: no intervention planned at this time  -orthostatics neg   -MRA noted with Greater than 75% stenosis of the right distal common carotid artery. Approximately 60% stenosis of the proximal left internal carotid artery.  -per vasc - cont medical mgmt with outpt f/u   -Continue ASA and Statin Therapy  -furthering imaging per neuro   -vasc /neuro f/u     #CAD  -S/p PCI to LAD and LCX in 2014  -TTE 2019 shows mildly reduced LVEF 45%  -Continue BB, Asa, and Statin therapy  -Trop T elevated in setting of CKD  -EKG shows afib. No active ischemia noted.     #Ischemic cardiomyopathy  -Pt euvolemic on exam  -Continue BB, ASA, Statin  -cr overall stable cont Bumex and Metolazone M,W,F    #HTN  -BP overall acceptable  -c/w current meds      #DM2  -ISS  -Management as per primary team    #CKD  -Improving  -Management as per primary team    #Afib  -rates stable cont bb  -Pt on Pradaxa at home- cont hep gtt for now , resume PO if no interventions planned    #Gout  -iv steriods per primary team  -med f/u     dvt ppx  plan discussed with ACP

## 2021-10-26 NOTE — PROGRESS NOTE ADULT - TIME BILLING
Pt seen and examined, agree with the above assessment and plan by JOYCE Hansen.  CV stable  Doppler reveals no significant stenosis  MRA noted with Greater than 75% stenosis of the right distal common carotid artery. Approximately 60% stenosis of the proximal left internal carotid artery consistent w outside doppler  vasc by report planning for outpt followup if MRA brain unremarkable  Continue ASA and Statin Therapy  cr overall stable cont Bumex and Metolazone M,W,F  cont hep gtt for now

## 2021-10-26 NOTE — CONSULT NOTE ADULT - ATTENDING COMMENTS
65 yo Diabetic male , never smoker , noted to have carotis artery stenosis , and reported to not be a surgical candidate   Is overweight and has a chronic wound of the right leg which patient reports has become smaller over past several mo  Bilateral stasis dermatitis present  Absorptive dressing placed
PAOLO Pinto MD reviewed the resident note and agree with the findings and plan

## 2021-10-26 NOTE — CHART NOTE - NSCHARTNOTEFT_GEN_A_CORE
Carotid duplex without severe stenosis - No intervention per vascular  Heparin drip discontinued and pradaxa home dose resumed per Dr. base    [id number] Carotid duplex without severe stenosis - No intervention per vascular  Heparin drip discontinued and Pradaxa home dose resumed per Dr. Base    [id number]

## 2021-10-26 NOTE — CONSULT NOTE ADULT - ASSESSMENT
Pt is 65yo who presents w/ R anterior leg wound- stable  -pt seen and examined  -Afebrile, WBC 11  -Bilateral longstanding venous stasis, anterior R leg wound to subQ, stable, no drainage, no malodor, no acute signs of infection.  -Pod plan for local wound care w/ florentinell and compression  -Pt to f/u with Dr. Ware after discharge for continued care, follow up info in discharge note provider  -Discussed w/ attending

## 2021-10-26 NOTE — PROGRESS NOTE ADULT - SUBJECTIVE AND OBJECTIVE BOX
Admitting Diagnosis:  Dizziness and giddiness [R42]  DIZZINESS AND GIDDINESS        Background:  This is a 66y year old Male with the below past medical history who presents with the chief complaint of dizziness.  He hs hx of CAD and NSTEMI s/p 3 stents in  to LAD, proximal and distal LCx, ischemic cardiomyopathy, htn, T2DM c/b peripheral neuropathy, CVA/TIA, atrial fibrillation on pradaxa, chronic RLE wound, left carotid stenosis s/p endarterectomy, gout who presents to the ED with complaint of lightheadedness and dizziness. Episodes of dizziness and lightheadedness occur intermittently, typically after dinner while he is sitting. He notes feeling "zoned out" and unsteady, and has had 2 episodes of near falls.  He denies any vertiginous dizziness or LOC. He had an episode again this morning after breakfast. Patient reports symptoms are similar to when he required his L CEA, however not as severe. Patient has not checked fingerstick BS during these episodes. He sometimes sees "white stars" during the episodes. He reports BP is usually well controlled. He has chronic neuropathy affecting lower extremities to knees bilaterally, but no focal weakness. Patient denies any fever, chills, nausea, vomiting. He also denies any CP or palpitations. He is currently getting treated for chronic RLE wound which has been improving under the management of wound  care doctor.      Interval Hx:  Denies being dizzy here in the hospital    Past Medical History:  HTN (hypertension), benign [401.1]    HLD (hyperlipidemia) [272.4]    DM type 2 (diabetes mellitus, type 2) [250.00]    TIA (transient ischemic attack) [435.9]    Atrial fibrillation [427.31]    MI (myocardial infarction) [410.90]  circa     CAD (coronary artery disease) [414.00]    Neuropathy [G62.9]        Past Surgical History:  Status post angioplasty with stent [V45.82]  LULU x 3 2014    S/P carotid endarterectomy [Z98.89]  left    S/P CABG (coronary artery bypass graft) [Z95.1]        Social History:  No toxic habits    Family History:  FAMILY HISTORY:  Family history of heart disease  father    Family history of CVA  mother        Allergies:  No Known Allergies      ROS:  Constitutional: Patient offers no complaints of fevers or significant weight loss  Ears, Nose, Mouth and Throat: The patient presents with no abnormalities of the head, ears, eyes, nose or throat  Skin: Patient offers no concerns of new rashes or lesions  Respiratory: The patient presents with no abnormalities of the respiratory tract  Cardiovascular: The patient presents with no cardiac abnormalities  Gastrointestinal: The patient presents with no abnormalities of the GI system  Genitourinary: The patient presents with no dysuria, hematuria or frequent urination  Neurological: See HPI  Endocrine: Patient offers no complaints of excessive thirst, urination, or heat/cold intolerance    Advanced care planning reviewed and noted in the chart.    Medications:  acetaminophen     Tablet .. 650 milliGRAM(s) Oral every 6 hours PRN  allopurinol 200 milliGRAM(s) Oral daily  aspirin  chewable 81 milliGRAM(s) Oral daily  buMETAnide 2 milliGRAM(s) Oral daily  dextrose 40% Gel 15 Gram(s) Oral once  dextrose 5%. 1000 milliLiter(s) IV Continuous <Continuous>  dextrose 5%. 1000 milliLiter(s) IV Continuous <Continuous>  dextrose 50% Injectable 25 Gram(s) IV Push once  dextrose 50% Injectable 12.5 Gram(s) IV Push once  dextrose 50% Injectable 25 Gram(s) IV Push once  diltiazem    milliGRAM(s) Oral daily  glucagon  Injectable 1 milliGRAM(s) IntraMuscular once  heparin   Injectable 07523 Unit(s) IV Push every 6 hours PRN  heparin   Injectable 5000 Unit(s) IV Push every 6 hours PRN  heparin  Infusion. 2000 Unit(s)/Hr IV Continuous <Continuous>  insulin glargine Injectable (LANTUS) 25 Unit(s) SubCutaneous at bedtime  insulin lispro (ADMELOG) corrective regimen sliding scale   SubCutaneous three times a day before meals  insulin lispro (ADMELOG) corrective regimen sliding scale   SubCutaneous at bedtime  insulin lispro Injectable (ADMELOG) 10 Unit(s) SubCutaneous three times a day before meals  lidocaine   4% Patch 1 Patch Transdermal daily  methylPREDNISolone sodium succinate Injectable 10 milliGRAM(s) IV Push once  metolazone 5 milliGRAM(s) Oral <User Schedule>  metoprolol succinate ER 50 milliGRAM(s) Oral two times a day  oxycodone    5 mG/acetaminophen 325 mG 1 Tablet(s) Oral every 12 hours  pregabalin 100 milliGRAM(s) Oral three times a day      Labs:  CBC Full  -  ( 26 Oct 2021 07:00 )  WBC Count : 11.90 K/uL  RBC Count : 5.99 M/uL  Hemoglobin : 15.2 g/dL  Hematocrit : 49.7 %  Platelet Count - Automated : 197 K/uL  Mean Cell Volume : 83.0 fl  Mean Cell Hemoglobin : 25.4 pg  Mean Cell Hemoglobin Concentration : 30.6 gm/dL  Auto Neutrophil # : x  Auto Lymphocyte # : x  Auto Monocyte # : x  Auto Eosinophil # : x  Auto Basophil # : x  Auto Neutrophil % : x  Auto Lymphocyte % : x  Auto Monocyte % : x  Auto Eosinophil % : x  Auto Basophil % : x    10-    134<L>  |  94<L>  |  63<H>  ----------------------------<  178<H>  3.5   |  24  |  2.22<H>    Ca    9.2      26 Oct 2021 07:00  Phos  4.3     10-26  Mg     2.0     10-26      CAPILLARY BLOOD GLUCOSE      POCT Blood Glucose.: 208 mg/dL (26 Oct 2021 11:29)  POCT Blood Glucose.: 176 mg/dL (26 Oct 2021 08:12)  POCT Blood Glucose.: 279 mg/dL (25 Oct 2021 21:12)  POCT Blood Glucose.: 212 mg/dL (25 Oct 2021 16:31)      PTT - ( 26 Oct 2021 07:00 )  PTT:44.7 sec  Urinalysis Basic - ( 24 Oct 2021 23:56 )    Color: Light Yellow / Appearance: Clear / S.012 / pH: x  Gluc: x / Ketone: Negative  / Bili: Negative / Urobili: Negative   Blood: x / Protein: 100 mg/dL / Nitrite: Negative   Leuk Esterase: Negative / RBC: 4 /hpf / WBC 1 /HPF   Sq Epi: x / Non Sq Epi: 0 /hpf / Bacteria: Negative          Vitals:  Vital Signs Last 24 Hrs  T(C): 36.5 (26 Oct 2021 10:43), Max: 36.8 (25 Oct 2021 20:30)  T(F): 97.7 (26 Oct 2021 10:43), Max: 98.3 (25 Oct 2021 20:30)  HR: 83 (26 Oct 2021 10:43) (78 - 98)  BP: 165/74 (26 Oct 2021 10:43) (143/71 - 176/90)  BP(mean): 108 (25 Oct 2021 17:05) (108 - 108)  RR: 18 (26 Oct 2021 10:43) (18 - 18)  SpO2: 98% (26 Oct 2021 09:15) (96% - 99%)    NEUROLOGICAL EXAM:    Mental status: Awake, alert, and in no apparent distress. Oriented to person, place and time. Language function is normal. Recent memory, digit span and concentration were normal.     Cranial Nerves: Pupils were equal, round, reactive to light. Extraocular movements were intact. Visual field were full. Fundoscopic exam was deferred. Facial sensation was intact to light touch. There was no facial asymmetry. The palate was upgoing symmetrically and tongue was midline. Hearing acuity was intact to finger rub AU. Shoulder shrug was full bilaterally    Motor exam: Bulk and tone were normal. Strength was 5/5 in all four extremities. Fine finger movements were symmetric and normal. There was no pronator drift    Reflexes: 1+ in the bilateral upper extremities. 1+ in the bilateral lower extremities. Toes were downgoing bilaterally.     Sensation: Intact to light touch, temperature, vibration and proprioception.     Coordination: Finger-nose-finger and heel-to-shin was without dysmetria.     Gait: deferred    Imaging:    EXAM:  MR ANGIO NECK                            PROCEDURE DATE:  10/24/2021            INTERPRETATION:  CLINICAL INDICATION: Evaluate carotid and vertebral arteries. Dizziness. Left carotid endarterectomy.    TECHNIQUE: MRA of the neck was performed using time-of-flight technique. No contrast administered.    COMPARISON: Carotid ultrasound 10/22/2021. MRA neck 2014.    FINDINGS:  Please note the carotid origins are not included on the study.    Atherosclerotic plaque results in severe (greater than 75%) stenosis of the right distal common carotid artery. The right internal carotid artery is patent throughout its course.    Included portions of the left common carotid artery are without stenosis. Atherosclerotic plaque results in approximately 60% stenosis of the proximal left internal carotid artery.    The bilateral cervical vertebral arteries are patent throughout their course.    IMPRESSION:  -Greater than 75% stenosis of the right distal common carotid artery.    -Approximately 60% stenosis of the proximal left internal carotid artery.    Given the conflicting findings on comparison Doppler ultrasound, recommend CTA for more definitive evaluation.    _____________  NASCET criteria for internal carotid artery stenosis:  Mild: 0% to 49%  Moderate: 50% to 69%  Severe: 70% to 99%  Complete Occlusion    --- End of Report ---        JAZ BACON MD; Attending Radiologist  This document has been electronically signed. Oct 25 2021  9:20AM        EXAM:  CAROTID DUPLEX BILATERAL                            PROCEDURE DATE:  10/22/2021            INTERPRETATION:  CLINICAL INFORMATION: Left carotid endarterectomy.    COMPARISON: 2014.    TECHNIQUE: Grayscale, color and spectral Doppler examination of both carotid arteries was performed.    FINDINGS:    No elevated velocities or abnormal waveforms are encountered.    Peak systolic velocities are as follows:    RIGHT:  PROX CCA = 29 cm/s  DIST CCA = 51 cm/s  PROX ICA = 101 cm/s  DIST ICA = 92 cm/s  ECA = 45 cm/s    LEFT:  PROX CCA = 100 cm/s  DIST CCA = 74 cm/s  PROX ICA = 26 cm/s  DIST ICA = 24 cm/s  ECA = 180 cm/s    Right vertebral artery is not visualized. Antegrade flow is noted within the left vertebral artery.    IMPRESSION: No significant hemodynamic stenosis of either internal carotid artery.  Elevated velocities in the left external carotid artery, possibly mild stenosis.    Measurement of carotid stenosis is based on velocity parameters that correlate the residual internal carotid diameter with that of the more distal vessel in accordance with a method such as the [place] Symptomatic Carotid Endarterectomy Trial (NASCET).    --- End of Report ---                KIRK REGALADO MD; Attending Radiologist  This document has been electronically signed. Oct 22 2021  4:45PM

## 2021-10-26 NOTE — PROGRESS NOTE ADULT - SUBJECTIVE AND OBJECTIVE BOX
DATE OF SERVICE: 10-26-21 @ 12:41  CHIEF COMPLAINT:Patient is a 66y old  Male who presents with a chief complaint of dizziness and lightheadedness (26 Oct 2021 12:02)    	        PAST MEDICAL & SURGICAL HISTORY:  HTN (hypertension), benign    HLD (hyperlipidemia)    DM type 2 (diabetes mellitus, type 2)    TIA (transient ischemic attack)    Atrial fibrillation    MI (myocardial infarction)  circa 2014    CAD (coronary artery disease)    Neuropathy    Status post angioplasty with stent  LULU x 3 2/7/2014    S/P carotid endarterectomy  left            REVIEW OF SYSTEMS:    NECK: No pain or stiffness  RESPIRATORY: No cough, wheezing, chills or hemoptysis; No Shortness of Breath  CARDIOVASCULAR: No chest pain, palpitations, passing out, dizziness, or leg swelling  GASTROINTESTINAL: No abdominal or epigastric pain. No nausea, vomiting, or hematemesis; No diarrhea or constipation. No melena or hematochezia.  GENITOURINARY: No dysuria, frequency, hematuria, or incontinence  NEUROLOGICAL: No headaches, memory loss, loss of strength, numbness, or tremors  lef tknee pain    Medications:  MEDICATIONS  (STANDING):  allopurinol 200 milliGRAM(s) Oral daily  aspirin  chewable 81 milliGRAM(s) Oral daily  buMETAnide 2 milliGRAM(s) Oral daily  dextrose 40% Gel 15 Gram(s) Oral once  dextrose 5%. 1000 milliLiter(s) (50 mL/Hr) IV Continuous <Continuous>  dextrose 5%. 1000 milliLiter(s) (100 mL/Hr) IV Continuous <Continuous>  dextrose 50% Injectable 25 Gram(s) IV Push once  dextrose 50% Injectable 12.5 Gram(s) IV Push once  dextrose 50% Injectable 25 Gram(s) IV Push once  diltiazem    milliGRAM(s) Oral daily  glucagon  Injectable 1 milliGRAM(s) IntraMuscular once  heparin  Infusion. 2000 Unit(s)/Hr (20 mL/Hr) IV Continuous <Continuous>  insulin glargine Injectable (LANTUS) 25 Unit(s) SubCutaneous at bedtime  insulin lispro (ADMELOG) corrective regimen sliding scale   SubCutaneous three times a day before meals  insulin lispro (ADMELOG) corrective regimen sliding scale   SubCutaneous at bedtime  insulin lispro Injectable (ADMELOG) 10 Unit(s) SubCutaneous three times a day before meals  lidocaine   4% Patch 1 Patch Transdermal daily  metolazone 5 milliGRAM(s) Oral <User Schedule>  metoprolol succinate ER 50 milliGRAM(s) Oral two times a day  oxycodone    5 mG/acetaminophen 325 mG 1 Tablet(s) Oral every 12 hours  pregabalin 100 milliGRAM(s) Oral three times a day    MEDICATIONS  (PRN):  acetaminophen     Tablet .. 650 milliGRAM(s) Oral every 6 hours PRN Temp greater or equal to 38C (100.4F), Mild Pain (1 - 3)  heparin   Injectable 34321 Unit(s) IV Push every 6 hours PRN For aPTT less than 40  heparin   Injectable 5000 Unit(s) IV Push every 6 hours PRN For aPTT between 40 - 57    	    PHYSICAL EXAM:  T(C): 36.5 (10-26-21 @ 10:43), Max: 36.8 (10-25-21 @ 20:30)  HR: 83 (10-26-21 @ 10:43) (78 - 98)  BP: 165/74 (10-26-21 @ 10:43) (143/71 - 176/90)  RR: 18 (10-26-21 @ 10:43) (18 - 18)  SpO2: 98% (10-26-21 @ 09:15) (96% - 99%)  Wt(kg): --  I&O's Summary    25 Oct 2021 07:01  -  26 Oct 2021 07:00  --------------------------------------------------------  IN: 1656 mL / OUT: 2650 mL / NET: -994 mL    26 Oct 2021 07:01  -  26 Oct 2021 12:41  --------------------------------------------------------  IN: 360 mL / OUT: 600 mL / NET: -240 mL        Appearance: Normal	  HEENT:   Normal oral mucosa, PERRL, EOMI	  Lymphatic: No lymphadenopathy  Cardiovascular: Normal S1 S2, No JVD, No murmurs, No edema  Respiratory: Lungs clear to auscultation	  Psychiatry: A & O x 3, Mood & affect appropriate  Gastrointestinal:  Soft, Non-tender, + BS	  Skin: No rashes, No ecchymoses, No cyanosis	  Neurologic: Non-focal  Extremities:pvd  r foot wrapped  l knee tender    TELEMETRY: 	    ECG:  	  RADIOLOGY:  OTHER: 	  	  LABS:	 	    CARDIAC MARKERS:                                15.2   11.90 )-----------( 197      ( 26 Oct 2021 07:00 )             49.7     10-26    134<L>  |  94<L>  |  63<H>  ----------------------------<  178<H>  3.5   |  24  |  2.22<H>    Ca    9.2      26 Oct 2021 07:00  Phos  4.3     10-26  Mg     2.0     10-26      proBNP:   Lipid Profile:   HgA1c:   TSH:

## 2021-10-26 NOTE — CONSULT NOTE ADULT - SUBJECTIVE AND OBJECTIVE BOX
Podiatry pager #: [phone]/ [phone]    Patient is a 66y old  Male who presents with a chief complaint of dizziness and lightheadedness (26 Oct 2021 12:40)      HPI:  This patient is a 65yo gentleman with PMH of CAD and NSTEMI s/p 3 stents in 2014 to LAD, proximal and distal LCx, ischemic cardiomyopathy, htn, T2DM c/b peripheral neuropathy, CVA/TIA, atrial fibrillation on pradaxa, chronic RLE wound, left carotid stenosis s/p endarterectomy, gout who presents to the ED with complaint of lightheadedness and dizziness. Episodes of dizziness and lightheadedness occur intermittently, typically after dinner while he is sitting. He notes feeling "zoned out" and unsteady, and has had 2 episodes of near falls.  He denies any vertiginous dizziness or LOC. He had an episode again this morning after breakfast. Patient reports symptoms are similar to when he required his L CEA, however not as severe. Patient has not checked fingerstick BS during these episodes. He sometimes sees "white stars" during the episodes. He reports BP is usually well controlled. He has chronic neuropathy affecting lower extremities to knees bilaterally, but no focal weakness. Patient denies any fever, chills, nausea, vomiting. He also denies any CP or palpitations. He is currently getting treated for chronic RLE wound which has been improving under the management of wound  care doctor.  (21 Oct 2021 21:45)      PAST MEDICAL & SURGICAL HISTORY:  HTN (hypertension), benign    HLD (hyperlipidemia)    DM type 2 (diabetes mellitus, type 2)    TIA (transient ischemic attack)    Atrial fibrillation    MI (myocardial infarction)  circa 2014    CAD (coronary artery disease)    Neuropathy    Status post angioplasty with stent  LULU x 3 2/7/2014    S/P carotid endarterectomy  left        MEDICATIONS  (STANDING):  allopurinol 200 milliGRAM(s) Oral daily  aspirin  chewable 81 milliGRAM(s) Oral daily  buMETAnide 2 milliGRAM(s) Oral daily  dextrose 40% Gel 15 Gram(s) Oral once  dextrose 5%. 1000 milliLiter(s) (50 mL/Hr) IV Continuous <Continuous>  dextrose 5%. 1000 milliLiter(s) (100 mL/Hr) IV Continuous <Continuous>  dextrose 50% Injectable 25 Gram(s) IV Push once  dextrose 50% Injectable 12.5 Gram(s) IV Push once  dextrose 50% Injectable 25 Gram(s) IV Push once  diltiazem    milliGRAM(s) Oral daily  glucagon  Injectable 1 milliGRAM(s) IntraMuscular once  heparin  Infusion. 2000 Unit(s)/Hr (20 mL/Hr) IV Continuous <Continuous>  insulin glargine Injectable (LANTUS) 25 Unit(s) SubCutaneous at bedtime  insulin lispro (ADMELOG) corrective regimen sliding scale   SubCutaneous three times a day before meals  insulin lispro (ADMELOG) corrective regimen sliding scale   SubCutaneous at bedtime  insulin lispro Injectable (ADMELOG) 10 Unit(s) SubCutaneous three times a day before meals  lidocaine   4% Patch 1 Patch Transdermal daily  metolazone 5 milliGRAM(s) Oral <User Schedule>  metoprolol succinate ER 50 milliGRAM(s) Oral two times a day  oxycodone    5 mG/acetaminophen 325 mG 1 Tablet(s) Oral every 12 hours  pregabalin 100 milliGRAM(s) Oral three times a day    MEDICATIONS  (PRN):  acetaminophen     Tablet .. 650 milliGRAM(s) Oral every 6 hours PRN Temp greater or equal to 38C (100.4F), Mild Pain (1 - 3)  heparin   Injectable 66831 Unit(s) IV Push every 6 hours PRN For aPTT less than 40  heparin   Injectable 5000 Unit(s) IV Push every 6 hours PRN For aPTT between 40 - 57      Allergies    No Known Allergies    Intolerances        VITALS:    Vital Signs Last 24 Hrs  T(C): 36.5 (26 Oct 2021 10:43), Max: 36.8 (25 Oct 2021 20:30)  T(F): 97.7 (26 Oct 2021 10:43), Max: 98.3 (25 Oct 2021 20:30)  HR: 83 (26 Oct 2021 10:43) (78 - 98)  BP: 165/74 (26 Oct 2021 10:43) (143/71 - 176/90)  BP(mean): 108 (25 Oct 2021 17:05) (108 - 108)  RR: 18 (26 Oct 2021 10:43) (18 - 18)  SpO2: 98% (26 Oct 2021 09:15) (96% - 99%)    LABS:                          15.2   11.90 )-----------( 197      ( 26 Oct 2021 07:00 )             49.7       10-26    134<L>  |  94<L>  |  63<H>  ----------------------------<  178<H>  3.5   |  24  |  2.22<H>    Ca    9.2      26 Oct 2021 07:00  Phos  4.3     10-26  Mg     2.0     10-26        CAPILLARY BLOOD GLUCOSE      POCT Blood Glucose.: 208 mg/dL (26 Oct 2021 11:29)  POCT Blood Glucose.: 176 mg/dL (26 Oct 2021 08:12)  POCT Blood Glucose.: 279 mg/dL (25 Oct 2021 21:12)  POCT Blood Glucose.: 212 mg/dL (25 Oct 2021 16:31)      PTT - ( 26 Oct 2021 07:00 )  PTT:44.7 sec    LOWER EXTREMITY PHYSICAL EXAM:    Vasular: DP/PT NP, B/L, CFT <3 seconds B/L, Temperature gradient WNL, B/L.   Neuro: Epicritic sensation intact to the level of digits B/L.  Musculoskeletal/Ortho: unremarkable    Bilateral longstanding venous stasis, anterior R leg wound to subQ, stable, no drainage, no malodor, no acute signs of infection.     RADIOLOGY & ADDITIONAL STUDIES:

## 2021-10-26 NOTE — PROGRESS NOTE ADULT - SUBJECTIVE AND OBJECTIVE BOX
CARDIOLOGY FOLLOW UP - Dr. Mazariegos  Date of Service: 10/26/21  CC: denies cp, sob, and palpitations  reports knee pain     Review of Systems:  Constitutional: No fever, weight loss, or fatigue  Respiratory: No cough, wheezing, or hemoptysis, no shortness of breath  Cardiovascular: No chest pain, palpitations, passing out, dizziness, or leg swelling  Gastrointestinal: No abd or epigastric pain.  No nausea, vomiting, or hematemesis; no diarrhea or constipation, no melena or hematochezia  Vascular: no edema       PHYSICAL EXAM:  T(C): 36.5 (10-26-21 @ 10:43), Max: 36.8 (10-25-21 @ 20:30)  HR: 83 (10-26-21 @ 10:43) (78 - 98)  BP: 165/74 (10-26-21 @ 10:43) (143/71 - 176/90)  RR: 18 (10-26-21 @ 10:43) (18 - 18)  SpO2: 98% (10-26-21 @ 09:15) (96% - 99%)  Wt(kg): --  I&O's Summary    25 Oct 2021 07:01  -  26 Oct 2021 07:00  --------------------------------------------------------  IN: 1656 mL / OUT: 2650 mL / NET: -994 mL    26 Oct 2021 07:01  -  26 Oct 2021 12:03  --------------------------------------------------------  IN: 360 mL / OUT: 600 mL / NET: -240 mL        Appearance: Normal	  Cardiovascular: irreg  No JVD, No murmurs  Respiratory: Lungs clear to auscultation	  Gastrointestinal:  Soft, Non-tender, + BS	  Extremities: Normal range of motion, No clubbing, cyanosis or edema      Home Medications:  allopurinol 100 mg oral tablet: 2 tab(s) orally once a day (21 Oct 2021 22:50)  aspirin 81 mg oral delayed release tablet: 1 tab(s) orally once a day (21 Oct 2021 22:50)  bumetanide 2 mg oral tablet: 1 tab(s) orally once a day (21 Oct 2021 22:50)  insulin glargine 100 units/mL subcutaneous solution: 25 unit(s) subcutaneous once a day (21 Oct 2021 22:50)  metOLazone 5 mg oral tablet: 1 tab(s) orally 3 times a week (21 Oct 2021 22:50)  metoprolol succinate 50 mg oral tablet, extended release: 1 tab(s) orally 2 times a day (21 Oct 2021 22:50)  NovoLOG 100 units/mL subcutaneous solution:  (21 Oct 2021 22:50)  oxycodone-acetaminophen 5 mg-325 mg oral tablet: 1 tab(s) orally 2 times a day (21 Oct 2021 22:50)  Pradaxa 150 mg oral capsule: 1 cap(s) orally 2 times a day (21 Oct 2021 22:50)  pregabalin 100 mg oral capsule: 1 cap(s) orally 3 times a day (21 Oct 2021 22:50)      MEDICATIONS  (STANDING):  allopurinol 200 milliGRAM(s) Oral daily  aspirin  chewable 81 milliGRAM(s) Oral daily  buMETAnide 2 milliGRAM(s) Oral daily  dextrose 40% Gel 15 Gram(s) Oral once  dextrose 5%. 1000 milliLiter(s) (50 mL/Hr) IV Continuous <Continuous>  dextrose 5%. 1000 milliLiter(s) (100 mL/Hr) IV Continuous <Continuous>  dextrose 50% Injectable 25 Gram(s) IV Push once  dextrose 50% Injectable 12.5 Gram(s) IV Push once  dextrose 50% Injectable 25 Gram(s) IV Push once  diltiazem    milliGRAM(s) Oral daily  glucagon  Injectable 1 milliGRAM(s) IntraMuscular once  heparin  Infusion. 2000 Unit(s)/Hr (20 mL/Hr) IV Continuous <Continuous>  insulin glargine Injectable (LANTUS) 25 Unit(s) SubCutaneous at bedtime  insulin lispro (ADMELOG) corrective regimen sliding scale   SubCutaneous three times a day before meals  insulin lispro (ADMELOG) corrective regimen sliding scale   SubCutaneous at bedtime  insulin lispro Injectable (ADMELOG) 10 Unit(s) SubCutaneous three times a day before meals  lidocaine   4% Patch 1 Patch Transdermal daily  methylPREDNISolone sodium succinate Injectable 10 milliGRAM(s) IV Push once  metolazone 5 milliGRAM(s) Oral <User Schedule>  metoprolol succinate ER 50 milliGRAM(s) Oral two times a day  oxycodone    5 mG/acetaminophen 325 mG 1 Tablet(s) Oral every 12 hours  pregabalin 100 milliGRAM(s) Oral three times a day      TELEMETRY: 	afib   50-90s   ECG:  	  RADIOLOGY:   DIAGNOSTIC TESTING:  [ ] Echocardiogram:  [ ]  Catheterization:  [ ] Stress Test:    OTHER: 	    LABS:	 	    Troponin T, High Sensitivity Result: 68 ng/L [0 - 51] (10-21 @ 21:34)  Troponin T, High Sensitivity Result: 79 ng/L [0 - 51] (10-21 @ 17:00)                          15.2   11.90 )-----------( 197      ( 26 Oct 2021 07:00 )             49.7     10-26    134<L>  |  94<L>  |  63<H>  ----------------------------<  178<H>  3.5   |  24  |  2.22<H>    Ca    9.2      26 Oct 2021 07:00  Phos  4.3     10-26  Mg     2.0     10-26      PTT - ( 26 Oct 2021 07:00 )  PTT:44.7 sec

## 2021-10-26 NOTE — PROGRESS NOTE ADULT - ASSESSMENT
67yo gentleman with PMH of CAD and NSTEMI s/p 3 stents in 2014 to LAD, proximal and distal LCx, ischemic cardiomyopathy, htn, T2DM c/b peripheral neuropathy, CVA/TIA, atrial fibrillation on pradaxa, chronic RLE wound, left carotid stenosis s/p endarterectomy, gout who presents to the ED with complaint of lightheadedness and dizziness. Episodes of dizziness and lightheadedness occur intermittently,    1- CKD III   2- dizziness   3- hx DM   4- cad  5- a fib hx   6- chf  hx   7- hx gout       creatinine is baseline range.   no events on tele  orthostatics negative  continue bumex 2mg daily and cont with metolazone  pro-bnp --> 2100  allopurinol 200 mg daily   cardizem cd 120 mg daily   strict I/O  monitor daily weight.

## 2021-10-26 NOTE — PROGRESS NOTE ADULT - SUBJECTIVE AND OBJECTIVE BOX
[facility]   [phone]  RENAL FOLLOW UP NOTE  --------------------------------------------------------------------------------  Chief Complaint:    24 hour events/subjective:    Patient seen and examined.   denies dizziness, sob    PAST HISTORY  --------------------------------------------------------------------------------  No significant changes to PMH, PSH, FHx, SHx, unless otherwise noted    ALLERGIES & MEDICATIONS  --------------------------------------------------------------------------------  Allergies    No Known Allergies    Intolerances      Standing Inpatient Medications  allopurinol 200 milliGRAM(s) Oral daily  aspirin  chewable 81 milliGRAM(s) Oral daily  buMETAnide 2 milliGRAM(s) Oral daily  dextrose 40% Gel 15 Gram(s) Oral once  dextrose 5%. 1000 milliLiter(s) IV Continuous <Continuous>  dextrose 5%. 1000 milliLiter(s) IV Continuous <Continuous>  dextrose 50% Injectable 25 Gram(s) IV Push once  dextrose 50% Injectable 12.5 Gram(s) IV Push once  dextrose 50% Injectable 25 Gram(s) IV Push once  diltiazem    milliGRAM(s) Oral daily  glucagon  Injectable 1 milliGRAM(s) IntraMuscular once  heparin  Infusion. 2000 Unit(s)/Hr IV Continuous <Continuous>  insulin glargine Injectable (LANTUS) 25 Unit(s) SubCutaneous at bedtime  insulin lispro (ADMELOG) corrective regimen sliding scale   SubCutaneous three times a day before meals  insulin lispro (ADMELOG) corrective regimen sliding scale   SubCutaneous at bedtime  insulin lispro Injectable (ADMELOG) 10 Unit(s) SubCutaneous three times a day before meals  lidocaine   4% Patch 1 Patch Transdermal daily  methylPREDNISolone sodium succinate Injectable 10 milliGRAM(s) IV Push once  metolazone 5 milliGRAM(s) Oral <User Schedule>  metoprolol succinate ER 50 milliGRAM(s) Oral two times a day  oxycodone    5 mG/acetaminophen 325 mG 1 Tablet(s) Oral every 12 hours  pregabalin 100 milliGRAM(s) Oral three times a day    PRN Inpatient Medications  acetaminophen     Tablet .. 650 milliGRAM(s) Oral every 6 hours PRN  heparin   Injectable 32982 Unit(s) IV Push every 6 hours PRN  heparin   Injectable 5000 Unit(s) IV Push every 6 hours PRN      REVIEW OF SYSTEMS  --------------------------------------------------------------------------------    Gen: denies fevers/chills,  CVS: denies chest pain/palpitations  Resp: denies SOB/Cough  GI: Denies N/V/Abd pain  : Denies dysuria/oliguria/hematuria    All other systems were reviewed and are negative, except as noted.    VITALS/PHYSICAL EXAM  --------------------------------------------------------------------------------  T(C): 36.5 (10-26-21 @ 10:43), Max: 36.8 (10-25-21 @ 20:30)  HR: 83 (10-26-21 @ 10:43) (66 - 98)  BP: 165/74 (10-26-21 @ 10:43) (134/62 - 176/90)  RR: 18 (10-26-21 @ 10:43) (17 - 18)  SpO2: 98% (10-26-21 @ 09:15) (95% - 99%)  Wt(kg): --        10-25-21 @ 07:01  -  10-26-21 @ 07:00  --------------------------------------------------------  IN: 1656 mL / OUT: 2650 mL / NET: -994 mL      Physical Exam:  	  	Gen: Non toxic comfortable appearing   	Pulm: decrease breath sounds  no rales or ronchi or wheezing  	CV: Unable to determine JVD. RRR, S1S2;   	Abd: +BS, soft, nontender/nondistended  	: No suprapubic tenderness  	UE: Warm, no cyanosis  no clubbing,  no edema  	LE: Warm, no cyanosis, no edema,  + RLE ulcer with dressing + chronic stasis changes  	Neuro: alert and oriented. speech coherent   	Skin: Warm, no decrease skin turgor     LABS/STUDIES  --------------------------------------------------------------------------------              15.2   11.90 >-----------<  197      [10-26-21 @ 07:00]              49.7     134  |  94  |  63  ----------------------------<  178      [10-26-21 @ 07:00]  3.5   |  24  |  2.22        Ca     9.2     [10-26-21 @ 07:00]      Mg     2.0     [10-26-21 @ 07:00]      Phos  4.3     [10-26-21 @ 07:00]        PTT: 44.7       [10-26-21 @ 07:00]      Creatinine Trend:  SCr 2.22 [10-26 @ 07:00]  SCr 1.97 [10-25 @ 05:49]  SCr 1.88 [10-24 @ 06:38]  SCr 1.78 [10-23 @ 06:36]  SCr 2.31 [10-21 @ 17:00]              Urinalysis - [10-24-21 @ 23:56]      Color Light Yellow / Appearance Clear / SG 1.012 / pH 6.0      Gluc 100 mg/dL / Ketone Negative  / Bili Negative / Urobili Negative       Blood Small / Protein 100 mg/dL / Leuk Est Negative / Nitrite Negative      RBC 4 / WBC 1 / Hyaline 7 / Gran  / Sq Epi  / Non Sq Epi 0 / Bacteria Negative    Urine Creatinine 56      [10-24-21 @ 23:56]  Urine Protein 185      [10-24-21 @ 23:56]    HbA1c 11.7      [11-07-19 @ 09:14]

## 2021-10-26 NOTE — PROGRESS NOTE ADULT - ASSESSMENT
This patient is a 65yo gentleman with PMH of CAD and NSTEMI s/p 3 stents in 2014 to LAD, proximal and distal LCx, ischemic cardiomyopathy, htn, T2DM c/b peripheral neuropathy, CVA/TIA, atrial fibrillation on pradaxa, chronic RLE wound, left carotid stenosis s/p endarterectomy, gout who presents to the ED with complaint of lightheadedness and dizziness

## 2021-10-26 NOTE — CONSULT NOTE ADULT - REASON FOR ADMISSION
dizziness and lightheadedness
dizziness and lightheadedness
Dizziness
dizziness and lightheadedness
dizziness and lightheadedness

## 2021-10-26 NOTE — CONSULT NOTE ADULT - CONSULT REQUESTED DATE/TIME
26-Oct-2021
21-Oct-2021 20:15
24-Oct-2021 16:37
24-Oct-2021 22:00
21-Oct-2021 17:20
26-Oct-2021 13:47

## 2021-10-26 NOTE — PROGRESS NOTE ADULT - ASSESSMENT
67 y/o male with a past medical history of HTN, DMT2 c/b LLE neuropathy, CVA/TIA, Afib (on Pradaxa , MI, CAD x 3 stents, chronic unhealing RLE wound s/p angio, left CEA (2014) presents with dizziness    - Stable from vascular surgery standpoint  - Carotid duplex without severe stenosis  - Per neurology, unlikely cerebrovascular stenosis is playing a role in dizziness/confusion    Vascular surgery will sign off at this time, please reach out with any questions/concerns    [phone]     65 y/o male with a past medical history of HTN, DMT2 c/b LLE neuropathy, CVA/TIA, Afib (on Pradaxa , MI, CAD x 3 stents, chronic unhealing RLE wound s/p angio, left CEA (2014) presents with dizziness    - Stable from vascular surgery standpoint  - Carotid duplex without severe stenosis  - Per neurology, unlikely cerebrovascular stenosis is playing a role in dizziness/confusion  will hold off on any vasc interventions at this time and continue outpt carotid  duplex close surveillance in 3 mo jan 2022  above d/w pt who agrees  will follow

## 2021-10-26 NOTE — PROGRESS NOTE ADULT - ASSESSMENT
Impression:  This is a 66y Male, here with dizziness, more lightheaded and not vertigo.  Seems to be orthostatic by history.  Labs show some ANT, better since admission.  Also likely has autonomic component of neuropathy with orthostatic hypotension.  Lyrcia/opiates with ANT/CKD can contribute.  Doubt TIA/CVA.  Doubt role of stenosis of cerebrovasculature.  carotid doppler showed no significant stenosis, and dizziness generally does not localize to carotids.  Neurological exam is non-focal    Recommendations:  - MRA neck ordered by team.  MRI brain not ordered.  Neither seems high yield.  Will follow  - Follow orthostatics and treat appropriately if positive  - on IVF  - cautious use of diuretics  - optimal DM control  - cautious use of lyrica/opiates  - Physical therapy

## 2021-10-26 NOTE — PROGRESS NOTE ADULT - SUBJECTIVE AND OBJECTIVE BOX
Vascular Surgery    SUBJECTIVE: No acute events overnight.        OBJECTIVE    Physical Exam:  Neuro  A&Ox3 VSS  Vascular:  no acute changes     VITALS  T(C): 36.5 (10-26-21 @ 04:31), Max: 36.8 (10-25-21 @ 20:30)  HR: 78 (10-26-21 @ 04:31) (66 - 98)  BP: 143/71 (10-26-21 @ 05:02) (134/62 - 176/90)  RR: 18 (10-26-21 @ 04:31) (17 - 18)  SpO2: 99% (10-26-21 @ 04:31) (95% - 99%)  CAPILLARY BLOOD GLUCOSE      POCT Blood Glucose.: 176 mg/dL (26 Oct 2021 08:12)  POCT Blood Glucose.: 279 mg/dL (25 Oct 2021 21:12)  POCT Blood Glucose.: 212 mg/dL (25 Oct 2021 16:31)  POCT Blood Glucose.: 213 mg/dL (25 Oct 2021 11:52)      Is/Os    10-25 @ 07:01  -  10-26 @ 07:00  --------------------------------------------------------  IN:    Heparin Infusion: 96 mL    Oral Fluid: 1560 mL  Total IN: 1656 mL    OUT:    Voided (mL): 2650 mL  Total OUT: 2650 mL    Total NET: -994 mL          MEDICATIONS (STANDING): allopurinol 200 milliGRAM(s) Oral daily  aspirin  chewable 81 milliGRAM(s) Oral daily  buMETAnide 2 milliGRAM(s) Oral daily  dextrose 40% Gel 15 Gram(s) Oral once  dextrose 5%. 1000 milliLiter(s) IV Continuous <Continuous>  dextrose 5%. 1000 milliLiter(s) IV Continuous <Continuous>  dextrose 50% Injectable 25 Gram(s) IV Push once  dextrose 50% Injectable 12.5 Gram(s) IV Push once  dextrose 50% Injectable 25 Gram(s) IV Push once  diltiazem    milliGRAM(s) Oral daily  glucagon  Injectable 1 milliGRAM(s) IntraMuscular once  heparin  Infusion. 2000 Unit(s)/Hr IV Continuous <Continuous>  insulin glargine Injectable (LANTUS) 25 Unit(s) SubCutaneous at bedtime  insulin lispro (ADMELOG) corrective regimen sliding scale   SubCutaneous three times a day before meals  insulin lispro (ADMELOG) corrective regimen sliding scale   SubCutaneous at bedtime  insulin lispro Injectable (ADMELOG) 10 Unit(s) SubCutaneous three times a day before meals  metolazone 5 milliGRAM(s) Oral <User Schedule>  metoprolol succinate ER 50 milliGRAM(s) Oral two times a day  oxycodone    5 mG/acetaminophen 325 mG 1 Tablet(s) Oral every 12 hours  pregabalin 100 milliGRAM(s) Oral three times a day    MEDICATIONS (PRN):acetaminophen     Tablet .. 650 milliGRAM(s) Oral every 6 hours PRN Temp greater or equal to 38C (100.4F), Mild Pain (1 - 3)  heparin   Injectable 91333 Unit(s) IV Push every 6 hours PRN For aPTT less than 40  heparin   Injectable 5000 Unit(s) IV Push every 6 hours PRN For aPTT between 40 - 57      LABS  CBC (10-26 @ 07:00)                              15.2                           11.90<H>  )----------------(  197        --    % Neutrophils, --    % Lymphocytes, ANC: --                                  49.7    CBC (10-25 @ 05:51)                              14.8                           10.04   )----------------(  197        --    % Neutrophils, --    % Lymphocytes, ANC: --                                  47.9      BMP (10-26 @ 07:00)             134<L>  |  94<L>   |  63<H> 		Ca++ --      Ca 9.2                ---------------------------------( 178<H>		Mg 2.0                3.5     |  24      |  2.22<H>			Ph 4.3     BMP (10-25 @ 05:49)             135     |  94<L>   |  52<H> 		Ca++ --      Ca 9.0                ---------------------------------( 221<H>		Mg 1.9                3.6     |  23      |  1.97<H>			Ph 3.9         Coags (10-26 @ 07:00)  aPTT 44.7<H> / INR -- / PT --  Coags (10-25 @ 20:56)  aPTT 75.2<H> / INR -- / PT --            IMAGING STUDIES  ra< from: [facility] Duplex Carotid, Bilat (10.22.21 @ 15:51) >    IMPRESSION: No significant hemodynamic stenosis of either internal carotid artery.  Elevated velocities in the left external carotid artery, possibly mild stenosis.    Measurement of carotid stenosis is based on velocity parameters that correlate the residual internal carotid diameter with that of the more distal vessel in accordance with a method such as the [employer] Symptomatic Carotid Endarterectomy Trial (NASCET).    --- End of Report ---    < end of copied text >     Vascular Surgery    SUBJECTIVE: No acute events overnight.        OBJECTIVE    Physical Exam:  Neuro  A&Ox3 VSS  Vascular:  no acute changes     VITALS  T(C): 36.5 (10-26-21 @ 04:31), Max: 36.8 (10-25-21 @ 20:30)  HR: 78 (10-26-21 @ 04:31) (66 - 98)  BP: 143/71 (10-26-21 @ 05:02) (134/62 - 176/90)  RR: 18 (10-26-21 @ 04:31) (17 - 18)  SpO2: 99% (10-26-21 @ 04:31) (95% - 99%)  CAPILLARY BLOOD GLUCOSE      POCT Blood Glucose.: 176 mg/dL (26 Oct 2021 08:12)  POCT Blood Glucose.: 279 mg/dL (25 Oct 2021 21:12)  POCT Blood Glucose.: 212 mg/dL (25 Oct 2021 16:31)  POCT Blood Glucose.: 213 mg/dL (25 Oct 2021 11:52)      Is/Os    10-25 @ 07:01  -  10-26 @ 07:00  --------------------------------------------------------  IN:    Heparin Infusion: 96 mL    Oral Fluid: 1560 mL  Total IN: 1656 mL    OUT:    Voided (mL): 2650 mL  Total OUT: 2650 mL    Total NET: -994 mL    MEDICATIONS (STANDING): allopurinol 200 milliGRAM(s) Oral daily  aspirin  chewable 81 milliGRAM(s) Oral daily  buMETAnide 2 milliGRAM(s) Oral daily  dextrose 40% Gel 15 Gram(s) Oral once  dextrose 5%. 1000 milliLiter(s) IV Continuous <Continuous>  dextrose 5%. 1000 milliLiter(s) IV Continuous <Continuous>  dextrose 50% Injectable 25 Gram(s) IV Push once  dextrose 50% Injectable 12.5 Gram(s) IV Push once  dextrose 50% Injectable 25 Gram(s) IV Push once  diltiazem    milliGRAM(s) Oral daily  glucagon  Injectable 1 milliGRAM(s) IntraMuscular once  heparin  Infusion. 2000 Unit(s)/Hr IV Continuous <Continuous>  insulin glargine Injectable (LANTUS) 25 Unit(s) SubCutaneous at bedtime  insulin lispro (ADMELOG) corrective regimen sliding scale   SubCutaneous three times a day before meals  insulin lispro (ADMELOG) corrective regimen sliding scale   SubCutaneous at bedtime  insulin lispro Injectable (ADMELOG) 10 Unit(s) SubCutaneous three times a day before meals  metolazone 5 milliGRAM(s) Oral <User Schedule>  metoprolol succinate ER 50 milliGRAM(s) Oral two times a day  oxycodone    5 mG/acetaminophen 325 mG 1 Tablet(s) Oral every 12 hours  pregabalin 100 milliGRAM(s) Oral three times a day    MEDICATIONS (PRN):acetaminophen     Tablet .. 650 milliGRAM(s) Oral every 6 hours PRN Temp greater or equal to 38C (100.4F), Mild Pain (1 - 3)  heparin   Injectable 46537 Unit(s) IV Push every 6 hours PRN For aPTT less than 40  heparin   Injectable 5000 Unit(s) IV Push every 6 hours PRN For aPTT between 40 - 57      LABS  CBC (10-26 @ 07:00)                              15.2                           11.90<H>  )----------------(  197        --    % Neutrophils, --    % Lymphocytes, ANC: --                                  49.7    CBC (10-25 @ 05:51)                              14.8                           10.04   )----------------(  197        --    % Neutrophils, --    % Lymphocytes, ANC: --                                  47.9      BMP (10-26 @ 07:00)             134<L>  |  94<L>   |  63<H> 		Ca++ --      Ca 9.2                ---------------------------------( 178<H>		Mg 2.0                3.5     |  24      |  2.22<H>			Ph 4.3     BMP (10-25 @ 05:49)             135     |  94<L>   |  52<H> 		Ca++ --      Ca 9.0                ---------------------------------( 221<H>		Mg 1.9                3.6     |  23      |  1.97<H>			Ph 3.9         Coags (10-26 @ 07:00)  aPTT 44.7<H> / INR -- / PT --  Coags (10-25 @ 20:56)  aPTT 75.2<H> / INR -- / PT --            IMAGING STUDIES  ra< from: [facility] Duplex Carotid, Bilat (10.22.21 @ 15:51) >    IMPRESSION: No significant hemodynamic stenosis of either internal carotid artery.  Elevated velocities in the left external carotid artery, possibly mild stenosis.    Measurement of carotid stenosis is based on velocity parameters that correlate the residual internal carotid diameter with that of the more distal vessel in accordance with a method such as the [employer] Symptomatic Carotid Endarterectomy Trial (NASCET).    --- End of Report ---    < end of copied text >

## 2021-10-27 NOTE — PROGRESS NOTE ADULT - ASSESSMENT
65yo gentleman with PMH of CAD and NSTEMI s/p 3 stents in 2014 to LAD, proximal and distal LCx, ischemic cardiomyopathy, htn, T2DM c/b peripheral neuropathy, CVA/TIA, atrial fibrillation on pradaxa, chronic RLE wound, left carotid stenosis s/p endarterectomy, gout who presents to the ED with complaint of lightheadedness and dizziness. Episodes of dizziness and lightheadedness occur intermittently,    1- CKD III   2- dizziness   3- hx DM   4- cad  5- a fib hx   6- chf  hx   7- hx gout       creatinine is baseline range.   continue bumex 2mg daily and cont with metolazone  allopurinol 200 mg daily   cardizem cd 120 mg daily   metoprolol 50 mg BID  proteinuria, add lisinopril 5 mg daily  strict I/O  monitor daily weight.  discharge planning per primary team

## 2021-10-27 NOTE — PROGRESS NOTE ADULT - PROBLEM SELECTOR PROBLEM 3
Stage 3 chronic kidney disease

## 2021-10-27 NOTE — PROGRESS NOTE ADULT - ATTENDING COMMENTS
Seen, examined, and  agree with above as scribed by JOYCE Dacosta
Seen, examined, and  agree with above as scribed by JOYCE ocampo steady   add lisinopril 5 mg daily for htn pt to see Dr. Gallegos and have blood work done in 10 d   I d/w and he agrees
Seen, examined, and  agree with above as scribed by JOYCE ocampo steady range  cont bumex and metolazone
I Edward Pinto MD have seen and examined the patient today and agree with  the  evaluation, assessment and plan of the surgical house officer  PAOLO Pinto MD have personally seen and examined the patient at bedside today at  8am

## 2021-10-27 NOTE — PROGRESS NOTE ADULT - PROBLEM SELECTOR PLAN 4
HR is currently well controlled.  c/w a/c  Continue tmeds as per cards
HR is currently well controlled.  Pradaxa held and on heparin gtt currently.  Continue toprol 50mg BID. Continue diltiazem 120mg PO qdaily.
HR is currently well controlled.  c/w a/c  Continue toprol 50mg BID. Continue diltiazem 120mg PO qdaily.
HR is currently well controlled.  Pradaxa held and on heparin gtt currently.  Continue toprol 50mg BID. Continue diltiazem 120mg PO qdaily.
HR is currently well controlled.  c/w a/c  Continue tmeds as per cards
HR is currently well controlled.  c/w a/c  Continue tmeds as per cards

## 2021-10-27 NOTE — PROGRESS NOTE ADULT - PROBLEM SELECTOR PLAN 1
Differential diagnosis for episodic dizziness and lightheadedness for this patient includes but is not limited to symptomatic carotid stenosis, orthostatic hypotension, arrythmia, hypo/hyperglycemia. CTH did not show acute findings.   Monitor on telemetry.   Vascular surgery noted- no plan for acute surgical intervention.
I Edward Pinto MD have personally  seen and examined the patient today and have noted the findings and formulated the plan of care.  I Edward Pinto MD have personally seen and examined the patient at bedside today at  7am
symptomatic carotid stenosis,?  spoke w/ vascular  no tx as inpt at present   neuro eval noted    Vascular surgery noted- no plan for acute surgical intervention.
symptomatic carotid stenosis,?  spoke w/ vascular  no tx as inpt at present   neuro eval noted    Vascular surgery noted- no plan for acute surgical intervention.
symptomatic carotid stenosis,?   orthostatic hypotension ?  neuro eval noted  Monitor on telemetry.   Vascular surgery noted- no plan for acute surgical intervention.
I Edward Pinto MD have personally  seen and examined the patient today and have noted the findings and formulated the plan of care.  I Edward Pinto MD have personally seen and examined the patient at bedside today at  7am
I Edward Pinto MD have seen and examined the patient today and agree with  the  evaluation, assessment and plan of the surgical house officer  PAOLO Pinto MD have personally seen and examined the patient at bedside today at  8am
symptomatic carotid stenosis,?   orthostatic hypotension ?  neuro eval  Monitor on telemetry.   Vascular surgery noted- no plan for acute surgical intervention.
Differential diagnosis for episodic dizziness and lightheadedness for this patient includes but is not limited to symptomatic carotid stenosis, orthostatic hypotension, arrythmia, hypo/hyperglycemia. CTH did not show acute findings.   Monitor on telemetry.   Vascular surgery noted- no plan for acute surgical intervention.

## 2021-10-27 NOTE — PROGRESS NOTE ADULT - SUBJECTIVE AND OBJECTIVE BOX
Admitting Diagnosis:  Dizziness and giddiness [R42]  DIZZINESS AND GIDDINESS        Background:  This is a 66y year old Male with the below past medical history who presents with the chief complaint of dizziness.  He hs hx of CAD and NSTEMI s/p 3 stents in 2014 to LAD, proximal and distal LCx, ischemic cardiomyopathy, htn, T2DM c/b peripheral neuropathy, CVA/TIA, atrial fibrillation on pradaxa, chronic RLE wound, left carotid stenosis s/p endarterectomy, gout who presents to the ED with complaint of lightheadedness and dizziness. Episodes of dizziness and lightheadedness occur intermittently, typically after dinner while he is sitting. He notes feeling "zoned out" and unsteady, and has had 2 episodes of near falls.  He denies any vertiginous dizziness or LOC. He had an episode again this morning after breakfast. Patient reports symptoms are similar to when he required his L CEA, however not as severe. Patient has not checked fingerstick BS during these episodes. He sometimes sees "white stars" during the episodes. He reports BP is usually well controlled. He has chronic neuropathy affecting lower extremities to knees bilaterally, but no focal weakness. Patient denies any fever, chills, nausea, vomiting. He also denies any CP or palpitations. He is currently getting treated for chronic RLE wound which has been improving under the management of wound  care doctor.      Interval Hx:  Denies being dizzy now    Past Medical History:  HTN (hypertension), benign [401.1]    HLD (hyperlipidemia) [272.4]    DM type 2 (diabetes mellitus, type 2) [250.00]    TIA (transient ischemic attack) [435.9]    Atrial fibrillation [427.31]    MI (myocardial infarction) [410.90]  circa 2014    CAD (coronary artery disease) [414.00]    Neuropathy [G62.9]        Past Surgical History:  Status post angioplasty with stent [V45.82]  LULU x 3 2/7/2014    S/P carotid endarterectomy [Z98.89]  left    S/P CABG (coronary artery bypass graft) [Z95.1]        Social History:  No toxic habits    Family History:  FAMILY HISTORY:  Family history of heart disease  father    Family history of CVA  mother        Allergies:  No Known Allergies      ROS:  Constitutional: Patient offers no complaints of fevers or significant weight loss  Ears, Nose, Mouth and Throat: The patient presents with no abnormalities of the head, ears, eyes, nose or throat  Skin: Patient offers no concerns of new rashes or lesions  Respiratory: The patient presents with no abnormalities of the respiratory tract  Cardiovascular: The patient presents with no cardiac abnormalities  Gastrointestinal: The patient presents with no abnormalities of the GI system  Genitourinary: The patient presents with no dysuria, hematuria or frequent urination  Neurological: See HPI  Endocrine: Patient offers no complaints of excessive thirst, urination, or heat/cold intolerance    Advanced care planning reviewed and noted in the chart.    Medications:  acetaminophen     Tablet .. 650 milliGRAM(s) Oral every 6 hours PRN  allopurinol 200 milliGRAM(s) Oral daily  aspirin  chewable 81 milliGRAM(s) Oral daily  buMETAnide 2 milliGRAM(s) Oral daily  dabigatran 150 milliGRAM(s) Oral every 12 hours  dextrose 40% Gel 15 Gram(s) Oral once  dextrose 5%. 1000 milliLiter(s) IV Continuous <Continuous>  dextrose 5%. 1000 milliLiter(s) IV Continuous <Continuous>  dextrose 50% Injectable 25 Gram(s) IV Push once  dextrose 50% Injectable 12.5 Gram(s) IV Push once  dextrose 50% Injectable 25 Gram(s) IV Push once  diltiazem    milliGRAM(s) Oral daily  glucagon  Injectable 1 milliGRAM(s) IntraMuscular once  insulin glargine Injectable (LANTUS) 25 Unit(s) SubCutaneous at bedtime  insulin lispro (ADMELOG) corrective regimen sliding scale   SubCutaneous three times a day before meals  insulin lispro (ADMELOG) corrective regimen sliding scale   SubCutaneous at bedtime  insulin lispro Injectable (ADMELOG) 10 Unit(s) SubCutaneous three times a day before meals  lidocaine   4% Patch 1 Patch Transdermal daily  metolazone 5 milliGRAM(s) Oral <User Schedule>  metoprolol succinate ER 75 milliGRAM(s) Oral two times a day  oxycodone    5 mG/acetaminophen 325 mG 1 Tablet(s) Oral every 12 hours  pregabalin 100 milliGRAM(s) Oral three times a day      Labs:  CBC Full  -  ( 27 Oct 2021 07:26 )  WBC Count : 12.94 K/uL  RBC Count : 5.92 M/uL  Hemoglobin : 14.9 g/dL  Hematocrit : 48.9 %  Platelet Count - Automated : 206 K/uL  Mean Cell Volume : 82.6 fl  Mean Cell Hemoglobin : 25.2 pg  Mean Cell Hemoglobin Concentration : 30.5 gm/dL  Auto Neutrophil # : x  Auto Lymphocyte # : x  Auto Monocyte # : x  Auto Eosinophil # : x  Auto Basophil # : x  Auto Neutrophil % : x  Auto Lymphocyte % : x  Auto Monocyte % : x  Auto Eosinophil % : x  Auto Basophil % : x    10-27    134<L>  |  92<L>  |  70<H>  ----------------------------<  307<H>  4.0   |  24  |  2.22<H>    Ca    9.6      27 Oct 2021 07:08  Phos  4.3     10-26  Mg     2.0     10-26      CAPILLARY BLOOD GLUCOSE      POCT Blood Glucose.: 354 mg/dL (27 Oct 2021 07:33)  POCT Blood Glucose.: 361 mg/dL (26 Oct 2021 22:18)  POCT Blood Glucose.: 429 mg/dL (26 Oct 2021 21:09)  POCT Blood Glucose.: 312 mg/dL (26 Oct 2021 16:28)  POCT Blood Glucose.: 208 mg/dL (26 Oct 2021 11:29)      PTT - ( 26 Oct 2021 22:32 )  PTT:36.1 sec        Vitals:  Vital Signs Last 24 Hrs  T(C): 36.4 (27 Oct 2021 04:46), Max: 36.5 (26 Oct 2021 10:43)  T(F): 97.6 (27 Oct 2021 04:46), Max: 97.7 (26 Oct 2021 10:43)  HR: 71 (27 Oct 2021 04:46) (71 - 88)  BP: 161/80 (27 Oct 2021 04:46) (161/80 - 176/78)  BP(mean): --  RR: 18 (27 Oct 2021 04:46) (18 - 18)  SpO2: 95% (27 Oct 2021 04:46) (95% - 95%)    NEUROLOGICAL EXAM:    Mental status: Awake, alert, and in no apparent distress. Oriented to person, place and time. Language function is normal. Recent memory, digit span and concentration were normal.     Cranial Nerves: Pupils were equal, round, reactive to light. Extraocular movements were intact. Visual field were full. Fundoscopic exam was deferred. Facial sensation was intact to light touch. There was no facial asymmetry. The palate was upgoing symmetrically and tongue was midline. Hearing acuity was intact to finger rub AU. Shoulder shrug was full bilaterally    Motor exam: Bulk and tone were normal. Strength was 5/5 in all four extremities. Fine finger movements were symmetric and normal. There was no pronator drift    Reflexes: 1+ in the bilateral upper extremities. 1+ in the bilateral lower extremities. Toes were downgoing bilaterally.     Sensation: Intact to light touch, temperature, vibration and proprioception.     Coordination: Finger-nose-finger and heel-to-shin was without dysmetria.     Gait: deferred    Imaging:    EXAM:  MR ANGIO NECK                            PROCEDURE DATE:  10/24/2021            INTERPRETATION:  CLINICAL INDICATION: Evaluate carotid and vertebral arteries. Dizziness. Left carotid endarterectomy.    TECHNIQUE: MRA of the neck was performed using time-of-flight technique. No contrast administered.    COMPARISON: Carotid ultrasound 10/22/2021. MRA neck 2/5/2014.    FINDINGS:  Please note the carotid origins are not included on the study.    Atherosclerotic plaque results in severe (greater than 75%) stenosis of the right distal common carotid artery. The right internal carotid artery is patent throughout its course.    Included portions of the left common carotid artery are without stenosis. Atherosclerotic plaque results in approximately 60% stenosis of the proximal left internal carotid artery.    The bilateral cervical vertebral arteries are patent throughout their course.    IMPRESSION:  -Greater than 75% stenosis of the right distal common carotid artery.    -Approximately 60% stenosis of the proximal left internal carotid artery.    Given the conflicting findings on comparison Doppler ultrasound, recommend CTA for more definitive evaluation.    _____________  NASCET criteria for internal carotid artery stenosis:  Mild: 0% to 49%  Moderate: 50% to 69%  Severe: 70% to 99%  Complete Occlusion    --- End of Report ---        JAZ BACON MD; Attending Radiologist  This document has been electronically signed. Oct 25 2021  9:20AM        EXAM:  CAROTID DUPLEX BILATERAL                            PROCEDURE DATE:  10/22/2021            INTERPRETATION:  CLINICAL INFORMATION: Left carotid endarterectomy.    COMPARISON: February 6, 2014.    TECHNIQUE: Grayscale, color and spectral Doppler examination of both carotid arteries was performed.    FINDINGS:    No elevated velocities or abnormal waveforms are encountered.    Peak systolic velocities are as follows:    RIGHT:  PROX CCA = 29 cm/s  DIST CCA = 51 cm/s  PROX ICA = 101 cm/s  DIST ICA = 92 cm/s  ECA = 45 cm/s    LEFT:  PROX CCA = 100 cm/s  DIST CCA = 74 cm/s  PROX ICA = 26 cm/s  DIST ICA = 24 cm/s  ECA = 180 cm/s    Right vertebral artery is not visualized. Antegrade flow is noted within the left vertebral artery.    IMPRESSION: No significant hemodynamic stenosis of either internal carotid artery.  Elevated velocities in the left external carotid artery, possibly mild stenosis.    Measurement of carotid stenosis is based on velocity parameters that correlate the residual internal carotid diameter with that of the more distal vessel in accordance with a method such as the [place] [employer] Symptomatic Carotid Endarterectomy Trial (NASCET).    --- End of Report ---                KIRK REGALADO MD; Attending Radiologist  This document has been electronically signed. Oct 22 2021  4:45PM

## 2021-10-27 NOTE — PROGRESS NOTE ADULT - PROBLEM SELECTOR PLAN 2
I Edward Pinto MD have personally  seen and examined the patient today and have noted the findings and formulated the plan of care.  I Edward Pinto MD have personally seen and examined the patient at bedside today at 1pm
Per cardiology note- pt had TTE in 2019 which found mildly reduced LVEF 45%  Continue aspirin, statin, toprol.  Patient has pulmonary crackles at bases and lower extremity edema. Will cotinue bumex 2mg qhs and metolazone 5mg 3 x weekly.
Per cardiology note- pt had TTE in 2019 which found mildly reduced LVEF 45%  Continue aspirin, statin, toprol.  c/w diuretics
I Edward iPnto MD have personally  seen and examined the patient today and have noted the findings and formulated the plan of care.  I Edward Pinto MD have personally seen and examined the patient at bedside today at  7am
I Edward Pinto MD have seen and examined the patient today and agree with  the  evaluation, assessment and plan of the surgical house officer  PAOLO Pinto MD have personally seen and examined the patient at bedside today at  8am
Per cardiology note- pt had TTE in 2019 which found mildly reduced LVEF 45%  Continue aspirin, statin, toprol.  c/w diuretics
Per cardiology note- pt had TTE in 2019 which found mildly reduced LVEF 45%  Continue aspirin, statin, toprol.  Patient has pulmonary crackles at bases and lower extremity edema. Will cotinue bumex 2mg qhs and metolazone 5mg 3 x weekly.
Per cardiology note- pt had TTE in 2019 which found mildly reduced LVEF 45%  Continue aspirin, statin, toprol.  c/w diuretics
Per cardiology note- pt had TTE in 2019 which found mildly reduced LVEF 45%  Continue aspirin, statin, toprol.  c/w diuretics

## 2021-10-27 NOTE — PROGRESS NOTE ADULT - SUBJECTIVE AND OBJECTIVE BOX
CARDIOLOGY FOLLOW UP - Dr. Mazariegos  Date of Service: 10/27/21  CC: denies cp, sob, and palpitations   c/o foot pain     Review of Systems:  Constitutional: No fever, weight loss, or fatigue  Respiratory: No cough, wheezing, or hemoptysis, no shortness of breath  Cardiovascular: No chest pain, palpitations, passing out, dizziness, or leg swelling  Gastrointestinal: No abd or epigastric pain.  No nausea, vomiting, or hematemesis; no diarrhea or constipation, no melena or hematochezia  Vascular: no edema       PHYSICAL EXAM:  T(C): 36.5 (10-27-21 @ 10:39), Max: 36.5 (10-27-21 @ 10:39)  HR: 71 (10-27-21 @ 10:39) (71 - 88)  BP: 151/69 (10-27-21 @ 10:39) (151/69 - 176/78)  RR: 18 (10-27-21 @ 10:39) (18 - 18)  SpO2: 92% (10-27-21 @ 10:39) (92% - 95%)  Wt(kg): --  I&O's Summary    26 Oct 2021 07:01  -  27 Oct 2021 07:00  --------------------------------------------------------  IN: 1260 mL / OUT: 3300 mL / NET: -2040 mL    27 Oct 2021 07:01  -  27 Oct 2021 11:23  --------------------------------------------------------  IN: 360 mL / OUT: 1100 mL / NET: -740 mL        Appearance: Normal	  Cardiovascular: irreg  No JVD, No murmurs  Respiratory: Lungs clear to auscultation	  Gastrointestinal:  Soft, Non-tender, + BS	  Extremities: Normal range of motion, No clubbing, cyanosis or edema      Home Medications:  allopurinol 100 mg oral tablet: 2 tab(s) orally once a day (21 Oct 2021 22:50)  aspirin 81 mg oral delayed release tablet: 1 tab(s) orally once a day (21 Oct 2021 22:50)  bumetanide 2 mg oral tablet: 1 tab(s) orally once a day (21 Oct 2021 22:50)  insulin glargine 100 units/mL subcutaneous solution: 25 unit(s) subcutaneous once a day (21 Oct 2021 22:50)  metOLazone 5 mg oral tablet: 1 tab(s) orally 3 times a week (21 Oct 2021 22:50)  metoprolol succinate 50 mg oral tablet, extended release: 1 tab(s) orally 2 times a day (21 Oct 2021 22:50)  NovoLOG 100 units/mL subcutaneous solution:  (21 Oct 2021 22:50)  oxycodone-acetaminophen 5 mg-325 mg oral tablet: 1 tab(s) orally 2 times a day (21 Oct 2021 22:50)  Pradaxa 150 mg oral capsule: 1 cap(s) orally 2 times a day (21 Oct 2021 22:50)  pregabalin 100 mg oral capsule: 1 cap(s) orally 3 times a day (21 Oct 2021 22:50)      MEDICATIONS  (STANDING):  allopurinol 200 milliGRAM(s) Oral daily  aspirin  chewable 81 milliGRAM(s) Oral daily  buMETAnide 2 milliGRAM(s) Oral daily  dabigatran 150 milliGRAM(s) Oral every 12 hours  dextrose 40% Gel 15 Gram(s) Oral once  dextrose 5%. 1000 milliLiter(s) (50 mL/Hr) IV Continuous <Continuous>  dextrose 5%. 1000 milliLiter(s) (100 mL/Hr) IV Continuous <Continuous>  dextrose 50% Injectable 25 Gram(s) IV Push once  dextrose 50% Injectable 12.5 Gram(s) IV Push once  dextrose 50% Injectable 25 Gram(s) IV Push once  diltiazem    milliGRAM(s) Oral daily  glucagon  Injectable 1 milliGRAM(s) IntraMuscular once  insulin glargine Injectable (LANTUS) 25 Unit(s) SubCutaneous at bedtime  insulin lispro (ADMELOG) corrective regimen sliding scale   SubCutaneous three times a day before meals  insulin lispro (ADMELOG) corrective regimen sliding scale   SubCutaneous at bedtime  insulin lispro Injectable (ADMELOG) 10 Unit(s) SubCutaneous three times a day before meals  lidocaine   4% Patch 1 Patch Transdermal daily  metolazone 5 milliGRAM(s) Oral <User Schedule>  metoprolol succinate ER 50 milliGRAM(s) Oral two times a day  oxycodone    5 mG/acetaminophen 325 mG 1 Tablet(s) Oral every 12 hours  pregabalin 100 milliGRAM(s) Oral three times a day      TELEMETRY: afib 80-90s 	    ECG:  	  RADIOLOGY:   DIAGNOSTIC TESTING:  [ ] Echocardiogram:  [ ]  Catheterization:  [ ] Stress Test:    OTHER: 	    LABS:	 	    Troponin T, High Sensitivity Result: 68 ng/L [0 - 51] (10-21 @ 21:34)  Troponin T, High Sensitivity Result: 79 ng/L [0 - 51] (10-21 @ 17:00)                          14.9   12.94 )-----------( 206      ( 27 Oct 2021 07:26 )             48.9     10-27    134<L>  |  92<L>  |  70<H>  ----------------------------<  307<H>  4.0   |  24  |  2.22<H>    Ca    9.6      27 Oct 2021 07:08  Phos  4.3     10-26  Mg     2.0     10-26      PTT - ( 26 Oct 2021 22:32 )  PTT:36.1 sec

## 2021-10-27 NOTE — PROGRESS NOTE ADULT - PROBLEM SELECTOR PLAN 5
c/w insulin   Continue sliding scale humalog premeal and qhs.
Start lantus 25units qhs and premeal novolog 10units TID.  Continue sliding scale humalog premeal and qhs.
c/w linsulin   Continue sliding scale humalog premeal and qhs.
Start lantus 25units qhs and premeal novolog 10units TID.  Continue sliding scale humalog premeal and qhs.
c/w lantus 25units qhs and premeal novolog 10units TID.  Continue sliding scale humalog premeal and qhs.
c/w insulin   Continue sliding scale humalog premeal and qhs.

## 2021-10-27 NOTE — PROGRESS NOTE ADULT - SUBJECTIVE AND OBJECTIVE BOX
DATE OF SERVICE: 10-27-21 @ 12:17  CHIEF COMPLAINT:Patient is a 66y old  Male who presents with a chief complaint of dizziness and lightheadedness (27 Oct 2021 11:23)    	        PAST MEDICAL & SURGICAL HISTORY:  HTN (hypertension), benign    HLD (hyperlipidemia)    DM type 2 (diabetes mellitus, type 2)    TIA (transient ischemic attack)    Atrial fibrillation    MI (myocardial infarction)  circa 2014    CAD (coronary artery disease)    Neuropathy    Status post angioplasty with stent  LULU x 3 2/7/2014    S/P carotid endarterectomy  left            REVIEW OF SYSTEMS:  CONSTITUTIONAL: No fever, weight loss, or fatigue  EYES: No eye pain, visual disturbances, or discharge  NECK: No pain or stiffness  RESPIRATORY: No cough, wheezing, chills or hemoptysis; No Shortness of Breath  CARDIOVASCULAR: No chest pain, palpitations, passing out,   GASTROINTESTINAL: No abdominal or epigastric pain. No nausea, vomiting, or hematemesis; No diarrhea or constipation. No melena or hematochezia.  GENITOURINARY: No dysuria, frequency, hematuria, or incontinence  NEUROLOGICAL: No headaches,   l knee feels much better    Medications:  MEDICATIONS  (STANDING):  allopurinol 200 milliGRAM(s) Oral daily  aspirin  chewable 81 milliGRAM(s) Oral daily  buMETAnide 2 milliGRAM(s) Oral daily  dabigatran 150 milliGRAM(s) Oral every 12 hours  dextrose 40% Gel 15 Gram(s) Oral once  dextrose 5%. 1000 milliLiter(s) (50 mL/Hr) IV Continuous <Continuous>  dextrose 5%. 1000 milliLiter(s) (100 mL/Hr) IV Continuous <Continuous>  dextrose 50% Injectable 25 Gram(s) IV Push once  dextrose 50% Injectable 12.5 Gram(s) IV Push once  dextrose 50% Injectable 25 Gram(s) IV Push once  diltiazem    milliGRAM(s) Oral daily  glucagon  Injectable 1 milliGRAM(s) IntraMuscular once  insulin glargine Injectable (LANTUS) 25 Unit(s) SubCutaneous at bedtime  insulin lispro (ADMELOG) corrective regimen sliding scale   SubCutaneous three times a day before meals  insulin lispro (ADMELOG) corrective regimen sliding scale   SubCutaneous at bedtime  insulin lispro Injectable (ADMELOG) 10 Unit(s) SubCutaneous three times a day before meals  lidocaine   4% Patch 1 Patch Transdermal daily  metolazone 5 milliGRAM(s) Oral <User Schedule>  metoprolol succinate ER 50 milliGRAM(s) Oral two times a day  oxycodone    5 mG/acetaminophen 325 mG 1 Tablet(s) Oral every 12 hours  pregabalin 100 milliGRAM(s) Oral three times a day    MEDICATIONS  (PRN):  acetaminophen     Tablet .. 650 milliGRAM(s) Oral every 6 hours PRN Temp greater or equal to 38C (100.4F), Mild Pain (1 - 3)    	    PHYSICAL EXAM:  T(C): 36.5 (10-27-21 @ 10:39), Max: 36.5 (10-27-21 @ 10:39)  HR: 71 (10-27-21 @ 10:39) (71 - 88)  BP: 151/69 (10-27-21 @ 10:39) (151/69 - 176/78)  RR: 18 (10-27-21 @ 10:39) (18 - 18)  SpO2: 92% (10-27-21 @ 10:39) (92% - 95%)  Wt(kg): --  I&O's Summary    26 Oct 2021 07:01  -  27 Oct 2021 07:00  --------------------------------------------------------  IN: 1260 mL / OUT: 3300 mL / NET: -2040 mL    27 Oct 2021 07:01  -  27 Oct 2021 12:17  --------------------------------------------------------  IN: 360 mL / OUT: 1100 mL / NET: -740 mL        Appearance: Normal	  HEENT:   Normal oral mucosa, PERRL, EOMI	  Lymphatic: No lymphadenopathy  Cardiovascular: Normal S1 S2, No JVD,   Respiratory: Lungs clear to auscultation	  Psychiatry: A & O x 3, Mood & affect appropriate  Gastrointestinal:  Soft, Non-tender, + BS	  Neurologic: Non-focal  Extremities: dec rom  pvd   TELEMETRY: 	    ECG:  	  RADIOLOGY:  OTHER: 	  	  LABS:	 	    CARDIAC MARKERS:                                14.9   12.94 )-----------( 206      ( 27 Oct 2021 07:26 )             48.9     10-27    134<L>  |  92<L>  |  70<H>  ----------------------------<  307<H>  4.0   |  24  |  2.22<H>    Ca    9.6      27 Oct 2021 07:08  Phos  4.3     10-26  Mg     2.0     10-26      proBNP:   Lipid Profile:   HgA1c:   TSH:

## 2021-10-27 NOTE — PROGRESS NOTE ADULT - PROBLEM SELECTOR PROBLEM 5
Type 2 diabetes mellitus with peripheral neuropathy
Ears: no ear pain and no hearing problems.Nose: no nasal congestion and no nasal drainage.Mouth/Throat: no dysphagia, no hoarseness and no throat pain.Neck: no lumps, no pain, no stiffness and no swollen glands.

## 2021-10-27 NOTE — PROGRESS NOTE ADULT - PROBLEM SELECTOR PROBLEM 2
Carotid stenosis, bilateral
Ischemic cardiomyopathy
Ischemic cardiomyopathy
Carotid stenosis, bilateral
Carotid stenosis, bilateral
Ischemic cardiomyopathy

## 2021-10-27 NOTE — PROGRESS NOTE ADULT - REASON FOR ADMISSION
dizziness and lightheadedness

## 2021-10-27 NOTE — PROGRESS NOTE ADULT - PROVIDER SPECIALTY LIST ADULT
Cardiology
Nephrology
Cardiology
Cardiology
Neurology
Vascular Surgery
Nephrology
Nephrology
Vascular Surgery
Internal Medicine
Internal Medicine
Vascular Surgery
Internal Medicine

## 2021-10-27 NOTE — PROVIDER CONTACT NOTE (OTHER) - SITUATION
oxygen
Pt bp is 176/78; pt's bp tends to run on high side.
ptt-122.2
K:3.6
ptt sub therapeutic this AM, has been therapeutic at 13ml/hr; recheck PTT

## 2021-10-27 NOTE — PROGRESS NOTE ADULT - SUBJECTIVE AND OBJECTIVE BOX
[facility]   [phone]  RENAL FOLLOW UP NOTE  --------------------------------------------------------------------------------  Chief Complaint:    24 hour events/subjective:    Patient seen and examined.   received IV steroids yesterday for suspected gout.  denies sob, dizziness    PAST HISTORY  --------------------------------------------------------------------------------  No significant changes to PMH, PSH, FHx, SHx, unless otherwise noted    ALLERGIES & MEDICATIONS  --------------------------------------------------------------------------------  Allergies    No Known Allergies    Intolerances      Standing Inpatient Medications  allopurinol 200 milliGRAM(s) Oral daily  aspirin  chewable 81 milliGRAM(s) Oral daily  buMETAnide 2 milliGRAM(s) Oral daily  dabigatran 150 milliGRAM(s) Oral every 12 hours  dextrose 40% Gel 15 Gram(s) Oral once  dextrose 5%. 1000 milliLiter(s) IV Continuous <Continuous>  dextrose 5%. 1000 milliLiter(s) IV Continuous <Continuous>  dextrose 50% Injectable 25 Gram(s) IV Push once  dextrose 50% Injectable 12.5 Gram(s) IV Push once  dextrose 50% Injectable 25 Gram(s) IV Push once  diltiazem    milliGRAM(s) Oral daily  glucagon  Injectable 1 milliGRAM(s) IntraMuscular once  insulin glargine Injectable (LANTUS) 25 Unit(s) SubCutaneous at bedtime  insulin lispro (ADMELOG) corrective regimen sliding scale   SubCutaneous three times a day before meals  insulin lispro (ADMELOG) corrective regimen sliding scale   SubCutaneous at bedtime  insulin lispro Injectable (ADMELOG) 10 Unit(s) SubCutaneous three times a day before meals  lidocaine   4% Patch 1 Patch Transdermal daily  lisinopril 5 milliGRAM(s) Oral daily  metolazone 5 milliGRAM(s) Oral <User Schedule>  metoprolol succinate ER 50 milliGRAM(s) Oral two times a day  oxycodone    5 mG/acetaminophen 325 mG 1 Tablet(s) Oral every 12 hours  pregabalin 100 milliGRAM(s) Oral three times a day    PRN Inpatient Medications  acetaminophen     Tablet .. 650 milliGRAM(s) Oral every 6 hours PRN      REVIEW OF SYSTEMS  --------------------------------------------------------------------------------    Gen: denies fevers/chills,  CVS: denies chest pain/palpitations  Resp: denies SOB/Cough  GI: Denies N/V/Abd pain  : Denies dysuria/oliguria/hematuria    All other systems were reviewed and are negative, except as noted.    VITALS/PHYSICAL EXAM  --------------------------------------------------------------------------------  T(C): 36.5 (10-27-21 @ 10:39), Max: 36.5 (10-27-21 @ 10:39)  HR: 71 (10-27-21 @ 10:39) (71 - 88)  BP: 151/69 (10-27-21 @ 10:39) (151/69 - 176/78)  RR: 18 (10-27-21 @ 10:39) (18 - 18)  SpO2: 92% (10-27-21 @ 10:39) (92% - 95%)  Wt(kg): --        10-26-21 @ 07:01  -  10-27-21 @ 07:00  --------------------------------------------------------  IN: 1260 mL / OUT: 3300 mL / NET: -2040 mL    10-27-21 @ 07:01  -  10-27-21 @ 15:08  --------------------------------------------------------  IN: 720 mL / OUT: 1500 mL / NET: -780 mL      Physical Exam:  	  	Gen: Non toxic comfortable appearing   	Pulm: decrease breath sounds  no rales or ronchi or wheezing  	CV: Unable to determine JVD. RRR, S1S2;   	Abd: +BS, soft, nontender/nondistended  	: No suprapubic tenderness  	UE: Warm, no cyanosis  no clubbing,  no edema  	LE: Warm, no cyanosis, no edema,  + RLE ulcer with dressing + chronic stasis changes  	Neuro: alert and oriented. speech coherent   	Skin: Warm, no decrease skin turgor     LABS/STUDIES  --------------------------------------------------------------------------------              14.9   12.94 >-----------<  206      [10-27-21 @ 07:26]              48.9     134  |  92  |  70  ----------------------------<  307      [10-27-21 @ 07:08]  4.0   |  24  |  2.22        Ca     9.6     [10-27-21 @ 07:08]      Mg     2.0     [10-26-21 @ 07:00]      Phos  4.3     [10-26-21 @ 07:00]        PTT: 36.1       [10-26-21 @ 22:32]      Creatinine Trend:  SCr 2.22 [10-27 @ 07:08]  SCr 2.22 [10-26 @ 07:00]  SCr 1.97 [10-25 @ 05:49]  SCr 1.88 [10-24 @ 06:38]  SCr 1.78 [10-23 @ 06:36]              Urinalysis - [10-24-21 @ 23:56]      Color Light Yellow / Appearance Clear / SG 1.012 / pH 6.0      Gluc 100 mg/dL / Ketone Negative  / Bili Negative / Urobili Negative       Blood Small / Protein 100 mg/dL / Leuk Est Negative / Nitrite Negative      RBC 4 / WBC 1 / Hyaline 7 / Gran  / Sq Epi  / Non Sq Epi 0 / Bacteria Negative    Urine Creatinine 56      [10-24-21 @ 23:56]  Urine Protein 185      [10-24-21 @ 23:56]    HbA1c 11.7      [11-07-19 @ 09:14]

## 2021-10-27 NOTE — PROVIDER CONTACT NOTE (OTHER) - BACKGROUND
Patient admitted for Afib, placed on heparin gtt.
admit with dizziness, carotid stenosis, hx of afib on pradaxa at home
pt admitted with dizziness and giddiness
65 y/o male presents with shortness of breath with a past medical history of HTN, DMT2 c/b LLE neuropathy, CVA/TIA, Afib
admitting dx Dizziness and giddiness

## 2021-10-27 NOTE — DISCHARGE NOTE NURSING/CASE MANAGEMENT/SOCIAL WORK - PATIENT PORTAL LINK FT
You can access the FollowMyHealth Patient Portal offered by [facility] by registering at the following website: [email or web address]. By joining [facility]’s FollowMyHealth portal, you will also be able to view your health information using other applications (apps) compatible with our system.

## 2021-10-27 NOTE — PROGRESS NOTE ADULT - PROBLEM SELECTOR PLAN 7
Continue allopurinol.
flare improved  s/p solumedrol x 1 yesterday  pt eval for d/c
Continue allopurinol.
flare  solumedrol x 1
Continue allopurinol.
Continue allopurinol.

## 2021-10-27 NOTE — PROVIDER CONTACT NOTE (OTHER) - ACTION/TREATMENT ORDERED:
PA will order stat PTT
Sara COOK made aware. Will follow Full AC Nomogram, Heparin gtt now infusing at 13ml/hr. Next ptt lab will be drawn at 06:18. Will continue to monitor.
provider ordered: no supplementation at this time; continue monitoring; notify of any changes tele or otherwise
PA notified, 1L NC applied for comfort.
Provider ordered continues monitored, no intervention needed at this time. notify of any changes.

## 2021-10-27 NOTE — PROGRESS NOTE ADULT - ASSESSMENT
Impression:  This is a 66y Male, here with dizziness, more lightheaded and not vertigo.  Seems to be orthostatic by history.  Labs show some ANT, better since admission.  Also likely has autonomic component of neuropathy with orthostatic hypotension.  Lyrcia/opiates with ANT/CKD can contribute.  Doubt TIA/CVA.  Doubt role of stenosis of cerebrovasculature.  carotid doppler showed no significant stenosis, and dizziness generally does not localize to carotids.  Neurological exam is non-focal    Recommendations:    MRA noted above  concur with vascular, further workup inc cta not  so would defer  no further inpt neurological workup needed

## 2021-10-27 NOTE — PROGRESS NOTE ADULT - PROBLEM SELECTOR PLAN 9
VTE ppx: heparin gtt  Activity: OOB with assistance  Diet: DASH, CC

## 2021-10-27 NOTE — PROGRESS NOTE ADULT - PROBLEM SELECTOR PLAN 8
Wound care f/up  Elevate lower extremities.

## 2021-10-27 NOTE — PROGRESS NOTE ADULT - PROBLEM SELECTOR PROBLEM 8
Wound of right lower extremity

## 2021-10-27 NOTE — PROGRESS NOTE ADULT - PROBLEM SELECTOR PLAN 3
Avoid nephrotoxic agents. Renally dose medications.   renal eval
Avoid nephrotoxic agents. Renally dose medications.   renal eval noted
Avoid nephrotoxic agents. Renally dose medications.   renal eval noted
Avoid nephrotoxic agents. Renally dose medications.   SCr appears fairly stable- was about 2.3 in Aug 2020.
Avoid nephrotoxic agents. Renally dose medications.   SCr appears fairly stable- was about 2.3 in Aug 2020.
Avoid nephrotoxic agents. Renally dose medications.   renal eval noted

## 2021-10-27 NOTE — PROVIDER CONTACT NOTE (OTHER) - ASSESSMENT
pt VSS; no c/o CP, palps or dizziness; no events on telemtry; no other complaints at this time; pt on heparin drip therapeutic
VSS, No signs of bleeding noted. No c/o of headache, dizziness or SOB.
no bleeding noted; pt with no chest pain, vss
Pt AXOX4, requesting O2 for comfort at night when sleeping.   No respiratory distress noted, denies SOB, O2 sat 94% on room air.
Pt bp is 176/78, HR- 80  pt denies chest pain, shortness of breath, palpitation, dizziness.

## 2021-10-27 NOTE — PROGRESS NOTE ADULT - TIME BILLING
Pt seen and examined, agree with the above assessment and plan by JOYCE Hansen.  CV stable  Vasc followup appreciated  Continue ASA and Statin Therapy  cr overall stable cont Bumex and Metolazone M,W,F  cont pradaxa  DCP  Followup w Dr Colby Mazariegos as above

## 2021-10-27 NOTE — PROGRESS NOTE ADULT - PROBLEM/PLAN-6
DISPLAY PLAN FREE TEXT
previously intubated - no problems

## 2021-10-27 NOTE — PROGRESS NOTE ADULT - PROBLEM SELECTOR PLAN 6
Continue home dose of aspirin, statin, toprol.   The patient has CKD stage 3 and is not on an ACEI/ARB.   He denies any chest pain, with elevated troponin with CKD 3.
Continue home dose of aspirin, statin, toprol.   The patient has CKD stage 3 and is not on an ACEI/ARB.
Continue home dose of aspirin, statin, toprol.   The patient has CKD stage 3 and is not on an ACEI/ARB.   He denies any chest pain, with elevated troponin with CKD 3.

## 2021-10-27 NOTE — PROGRESS NOTE ADULT - ASSESSMENT
a/p   67 y/o male with a past medical history of HTN, DMT2 c/b LLE neuropathy, CVA/TIA, Afib (on Pradaxa , MI, CAD (NSTEMI) x 3 stents in 2014 to LAD, and proximal and distal LCx, ischemic cardiomyopathy, chronic unhealing RLE wound s/p angio, left CEA (2014) presents with dizziness      #R carotid stenosis  -Doppler reveals no significant stenosis  -Vascular input noted: no intervention planned at this time  -orthostatics neg   -MRA noted with Greater than 75% stenosis of the right distal common carotid artery. Approximately 60% stenosis of the proximal left internal carotid artery.  -per vasc - cont medical mgmt with outpt f/u   -Continue ASA and Statin Therapy  -furthering imaging per neuro   -vasc /neuro f/u     #CAD  -S/p PCI to LAD and LCX in 2014  -TTE 2019 shows mildly reduced LVEF 45%  -Continue BB, Asa, and Statin therapy  -Trop T elevated in setting of CKD  -EKG shows afib. No active ischemia noted.     #Ischemic cardiomyopathy  -Pt euvolemic on exam  -Continue BB, ASA, Statin  -cr overall stable cont Bumex and Metolazone M,W,F    #HTN  -BP overall acceptable - elevated in setting of steroids   -c/w current meds      #DM2  -ISS  -Management as per primary team    #CKD  -Improving  -Management as per primary team    #Afib  -rates stable cont bb  -cont pradaxa     #Gout  -s/p iv steriods per primary team  -med f/u     dvt ppx  plan discussed with ACP     DCP per primary team  pt to f/u w Dr. Colby Mazariegos on 11/12@ 115pm

## 2021-11-11 NOTE — ED ADULT TRIAGE NOTE - ACCOMPANIED BY
Called and left a detailed VM that I was calling to get him scheduled with Dr Trent in person. Next Avail is the end of Dec. Clinic number was left  
[facility] Call Center    Phone Message    May a detailed message be left on voicemail: yes     Reason for Call: Appointment Intake    Referring Provider Name: Navdeep Lorenzo [facility]    Diagnosis and/or Symptoms: Bilateral testicular atrophy - diagnosis not in guidelines.  Please review for scheduling.  Thank you!    Action Taken: Message routed to:  [facility] ([facility] Uro    Travel Screening: Not Applicable                                                                      
Spouse/Significant other

## 2021-11-26 NOTE — PATIENT PROFILE ADULT - STATED REASON FOR ADMISSION
shortness of breath Dr Whitfield  nontoxic female. mmm   normal S1-S2   No resp distress. able to speak in full and clear sentences. no wheeze, rales or stridor.  soft nontender abdomen. no cvat.   pelvic dw resident Dr Whitfield  nontoxic female. mmm   normal S1-S2   No resp distress. able to speak in full and clear sentences. no wheeze, rales or stridor.  soft nontender abdomen. no cvat.   pelvic dw resident  Pelvic Exam: Chaperone = KARINA Arora. The vulvar structures are unremarkable. A lubricated speculum is easily introduced into the vaginal introitus. The vaginal canal is unremarkable. Th cervix is visually unremarkable, and the cervical os is closed. There is no blood or discharge.

## 2022-01-01 ENCOUNTER — NON-APPOINTMENT (OUTPATIENT)
Age: 67
End: 2022-01-01

## 2022-01-01 ENCOUNTER — TRANSCRIPTION ENCOUNTER (OUTPATIENT)
Age: 67
End: 2022-01-01

## 2022-01-01 ENCOUNTER — INPATIENT (INPATIENT)
Facility: HOSPITAL | Age: 67
LOS: 6 days | Discharge: HOME | DRG: 246 | End: 2022-02-25
Attending: INTERNAL MEDICINE | Admitting: INTERNAL MEDICINE
Payer: COMMERCIAL

## 2022-01-01 ENCOUNTER — INPATIENT (INPATIENT)
Facility: HOSPITAL | Age: 67
LOS: 67 days | DRG: 270 | End: 2022-06-09
Attending: INTERNAL MEDICINE | Admitting: STUDENT IN AN ORGANIZED HEALTH CARE EDUCATION/TRAINING PROGRAM
Payer: COMMERCIAL

## 2022-01-01 VITALS
RESPIRATION RATE: 18 BRPM | SYSTOLIC BLOOD PRESSURE: 136 MMHG | HEIGHT: 71 IN | HEART RATE: 108 BPM | DIASTOLIC BLOOD PRESSURE: 73 MMHG | TEMPERATURE: 97 F | OXYGEN SATURATION: 97 %

## 2022-01-01 VITALS
HEART RATE: 64 BPM | OXYGEN SATURATION: 94 % | WEIGHT: 306 LBS | SYSTOLIC BLOOD PRESSURE: 146 MMHG | RESPIRATION RATE: 18 BRPM | HEIGHT: 71 IN | DIASTOLIC BLOOD PRESSURE: 75 MMHG | TEMPERATURE: 97 F

## 2022-01-01 VITALS — OXYGEN SATURATION: 100 % | HEART RATE: 141 BPM

## 2022-01-01 VITALS
TEMPERATURE: 97 F | OXYGEN SATURATION: 93 % | DIASTOLIC BLOOD PRESSURE: 69 MMHG | HEART RATE: 76 BPM | RESPIRATION RATE: 18 BRPM | SYSTOLIC BLOOD PRESSURE: 127 MMHG

## 2022-01-01 DIAGNOSIS — R06.02 SHORTNESS OF BREATH: ICD-10-CM

## 2022-01-01 DIAGNOSIS — E11.65 TYPE 2 DIABETES MELLITUS WITH HYPERGLYCEMIA: ICD-10-CM

## 2022-01-01 DIAGNOSIS — I25.10 ATHEROSCLEROTIC HEART DISEASE OF NATIVE CORONARY ARTERY WITHOUT ANGINA PECTORIS: ICD-10-CM

## 2022-01-01 DIAGNOSIS — E11.9 TYPE 2 DIABETES MELLITUS WITHOUT COMPLICATIONS: ICD-10-CM

## 2022-01-01 DIAGNOSIS — Z95.5 PRESENCE OF CORONARY ANGIOPLASTY IMPLANT AND GRAFT: Chronic | ICD-10-CM

## 2022-01-01 DIAGNOSIS — Z29.9 ENCOUNTER FOR PROPHYLACTIC MEASURES, UNSPECIFIED: ICD-10-CM

## 2022-01-01 DIAGNOSIS — N18.6 END STAGE RENAL DISEASE: ICD-10-CM

## 2022-01-01 DIAGNOSIS — I48.20 CHRONIC ATRIAL FIBRILLATION, UNSPECIFIED: ICD-10-CM

## 2022-01-01 DIAGNOSIS — I10 ESSENTIAL (PRIMARY) HYPERTENSION: ICD-10-CM

## 2022-01-01 DIAGNOSIS — Z98.89 OTHER SPECIFIED POSTPROCEDURAL STATES: Chronic | ICD-10-CM

## 2022-01-01 DIAGNOSIS — I48.91 UNSPECIFIED ATRIAL FIBRILLATION: ICD-10-CM

## 2022-01-01 DIAGNOSIS — Z71.89 OTHER SPECIFIED COUNSELING: ICD-10-CM

## 2022-01-01 DIAGNOSIS — R77.8 OTHER SPECIFIED ABNORMALITIES OF PLASMA PROTEINS: ICD-10-CM

## 2022-01-01 DIAGNOSIS — D64.9 ANEMIA, UNSPECIFIED: ICD-10-CM

## 2022-01-01 DIAGNOSIS — E78.5 HYPERLIPIDEMIA, UNSPECIFIED: ICD-10-CM

## 2022-01-01 DIAGNOSIS — Z51.5 ENCOUNTER FOR PALLIATIVE CARE: ICD-10-CM

## 2022-01-01 DIAGNOSIS — I65.22 OCCLUSION AND STENOSIS OF LEFT CAROTID ARTERY: ICD-10-CM

## 2022-01-01 DIAGNOSIS — N30.90 CYSTITIS, UNSPECIFIED WITHOUT HEMATURIA: ICD-10-CM

## 2022-01-01 DIAGNOSIS — I63.9 CEREBRAL INFARCTION, UNSPECIFIED: ICD-10-CM

## 2022-01-01 DIAGNOSIS — J96.90 RESPIRATORY FAILURE, UNSPECIFIED, UNSPECIFIED WHETHER WITH HYPOXIA OR HYPERCAPNIA: ICD-10-CM

## 2022-01-01 DIAGNOSIS — R53.2 FUNCTIONAL QUADRIPLEGIA: ICD-10-CM

## 2022-01-01 DIAGNOSIS — D72.829 ELEVATED WHITE BLOOD CELL COUNT, UNSPECIFIED: ICD-10-CM

## 2022-01-01 DIAGNOSIS — I25.5 ISCHEMIC CARDIOMYOPATHY: ICD-10-CM

## 2022-01-01 DIAGNOSIS — K92.1 MELENA: ICD-10-CM

## 2022-01-01 DIAGNOSIS — I65.29 OCCLUSION AND STENOSIS OF UNSPECIFIED CAROTID ARTERY: ICD-10-CM

## 2022-01-01 DIAGNOSIS — R07.9 CHEST PAIN, UNSPECIFIED: ICD-10-CM

## 2022-01-01 DIAGNOSIS — N18.9 CHRONIC KIDNEY DISEASE, UNSPECIFIED: ICD-10-CM

## 2022-01-01 DIAGNOSIS — R41.82 ALTERED MENTAL STATUS, UNSPECIFIED: ICD-10-CM

## 2022-01-01 DIAGNOSIS — I65.21 OCCLUSION AND STENOSIS OF RIGHT CAROTID ARTERY: ICD-10-CM

## 2022-01-01 DIAGNOSIS — I49.9 CARDIAC ARRHYTHMIA, UNSPECIFIED: ICD-10-CM

## 2022-01-01 DIAGNOSIS — K92.2 GASTROINTESTINAL HEMORRHAGE, UNSPECIFIED: ICD-10-CM

## 2022-01-01 LAB
% ALBUMIN: 41.8 % — SIGNIFICANT CHANGE UP
% ALPHA 1: 9.5 % — SIGNIFICANT CHANGE UP
% ALPHA 2: 12.3 % — SIGNIFICANT CHANGE UP
% BETA: 12.2 % — SIGNIFICANT CHANGE UP
% GAMMA: 24.2 % — SIGNIFICANT CHANGE UP
-  AMPICILLIN/SULBACTAM: SIGNIFICANT CHANGE UP
-  CEFAZOLIN: SIGNIFICANT CHANGE UP
-  CLINDAMYCIN: SIGNIFICANT CHANGE UP
-  ERYTHROMYCIN: SIGNIFICANT CHANGE UP
-  GENTAMICIN: SIGNIFICANT CHANGE UP
-  OXACILLIN: SIGNIFICANT CHANGE UP
-  RIFAMPIN: SIGNIFICANT CHANGE UP
-  TETRACYCLINE: SIGNIFICANT CHANGE UP
-  TRIMETHOPRIM/SULFAMETHOXAZOLE: SIGNIFICANT CHANGE UP
-  VANCOMYCIN: SIGNIFICANT CHANGE UP
A1C WITH ESTIMATED AVERAGE GLUCOSE RESULT: 10.1 % — HIGH (ref 4–5.6)
A1C WITH ESTIMATED AVERAGE GLUCOSE RESULT: 6.6 % — HIGH (ref 4–5.6)
ALBUMIN SERPL ELPH-MCNC: 2.3 G/DL — LOW (ref 3.6–5.5)
ALBUMIN SERPL ELPH-MCNC: 2.5 G/DL — LOW (ref 3.3–5)
ALBUMIN SERPL ELPH-MCNC: 2.7 G/DL — LOW (ref 3.3–5)
ALBUMIN SERPL ELPH-MCNC: 2.8 G/DL — LOW (ref 3.3–5)
ALBUMIN SERPL ELPH-MCNC: 2.9 G/DL — LOW (ref 3.3–5)
ALBUMIN SERPL ELPH-MCNC: 2.9 G/DL — LOW (ref 3.3–5)
ALBUMIN SERPL ELPH-MCNC: 3 G/DL — LOW (ref 3.3–5)
ALBUMIN SERPL ELPH-MCNC: 3.2 G/DL — LOW (ref 3.3–5)
ALBUMIN SERPL ELPH-MCNC: 3.3 G/DL — SIGNIFICANT CHANGE UP (ref 3.3–5)
ALBUMIN SERPL ELPH-MCNC: 3.6 G/DL — SIGNIFICANT CHANGE UP (ref 3.3–5)
ALBUMIN SERPL ELPH-MCNC: 3.8 G/DL — SIGNIFICANT CHANGE UP (ref 3.3–5)
ALBUMIN SERPL ELPH-MCNC: 4 G/DL — SIGNIFICANT CHANGE UP (ref 3.3–5)
ALBUMIN/GLOB SERPL ELPH: 0.7 RATIO — SIGNIFICANT CHANGE UP
ALP SERPL-CCNC: 104 U/L — SIGNIFICANT CHANGE UP (ref 40–120)
ALP SERPL-CCNC: 107 U/L — SIGNIFICANT CHANGE UP (ref 40–120)
ALP SERPL-CCNC: 109 U/L — SIGNIFICANT CHANGE UP (ref 40–120)
ALP SERPL-CCNC: 113 U/L — SIGNIFICANT CHANGE UP (ref 40–120)
ALP SERPL-CCNC: 114 U/L — SIGNIFICANT CHANGE UP (ref 40–120)
ALP SERPL-CCNC: 114 U/L — SIGNIFICANT CHANGE UP (ref 40–120)
ALP SERPL-CCNC: 121 U/L — HIGH (ref 40–120)
ALP SERPL-CCNC: 121 U/L — HIGH (ref 40–120)
ALP SERPL-CCNC: 132 U/L — HIGH (ref 40–120)
ALP SERPL-CCNC: 156 U/L — HIGH (ref 40–120)
ALP SERPL-CCNC: 193 U/L — HIGH (ref 40–120)
ALP SERPL-CCNC: 67 U/L — SIGNIFICANT CHANGE UP (ref 40–120)
ALP SERPL-CCNC: 76 U/L — SIGNIFICANT CHANGE UP (ref 40–120)
ALPHA1 GLOB SERPL ELPH-MCNC: 0.5 G/DL — HIGH (ref 0.1–0.4)
ALPHA2 GLOB SERPL ELPH-MCNC: 0.7 G/DL — SIGNIFICANT CHANGE UP (ref 0.5–1)
ALT FLD-CCNC: 5 U/L — LOW (ref 10–45)
ALT FLD-CCNC: 5 U/L — LOW (ref 10–45)
ALT FLD-CCNC: 6 U/L — LOW (ref 10–45)
ALT FLD-CCNC: 7 U/L — LOW (ref 10–45)
ALT FLD-CCNC: <5 U/L — LOW (ref 10–45)
AMMONIA BLD-MCNC: 16 UMOL/L — SIGNIFICANT CHANGE UP (ref 11–55)
AMMONIA BLD-MCNC: 19 UMOL/L — SIGNIFICANT CHANGE UP (ref 11–55)
ANA PAT FLD IF-IMP: NEGATIVE — SIGNIFICANT CHANGE UP
ANA TITR SER: NEGATIVE — SIGNIFICANT CHANGE UP
ANION GAP SERPL CALC-SCNC: 11 MMOL/L — SIGNIFICANT CHANGE UP (ref 5–17)
ANION GAP SERPL CALC-SCNC: 11 MMOL/L — SIGNIFICANT CHANGE UP (ref 5–17)
ANION GAP SERPL CALC-SCNC: 12 MMOL/L — SIGNIFICANT CHANGE UP (ref 5–17)
ANION GAP SERPL CALC-SCNC: 13 MMOL/L — SIGNIFICANT CHANGE UP (ref 5–17)
ANION GAP SERPL CALC-SCNC: 14 MMOL/L — SIGNIFICANT CHANGE UP (ref 5–17)
ANION GAP SERPL CALC-SCNC: 15 MMOL/L — SIGNIFICANT CHANGE UP (ref 5–17)
ANION GAP SERPL CALC-SCNC: 16 MMOL/L — SIGNIFICANT CHANGE UP (ref 5–17)
ANION GAP SERPL CALC-SCNC: 17 MMOL/L — SIGNIFICANT CHANGE UP (ref 5–17)
ANION GAP SERPL CALC-SCNC: 18 MMOL/L — HIGH (ref 5–17)
ANION GAP SERPL CALC-SCNC: 19 MMOL/L — HIGH (ref 5–17)
ANION GAP SERPL CALC-SCNC: 20 MMOL/L — HIGH (ref 5–17)
ANION GAP SERPL CALC-SCNC: 21 MMOL/L — HIGH (ref 5–17)
ANION GAP SERPL CALC-SCNC: 22 MMOL/L — HIGH (ref 5–17)
ANION GAP SERPL CALC-SCNC: 23 MMOL/L — HIGH (ref 5–17)
ANION GAP SERPL CALC-SCNC: 25 MMOL/L — HIGH (ref 5–17)
ANION GAP SERPL CALC-SCNC: 26 MMOL/L — HIGH (ref 5–17)
APPEARANCE UR: ABNORMAL
APPEARANCE UR: CLEAR — SIGNIFICANT CHANGE UP
APPEARANCE UR: CLEAR — SIGNIFICANT CHANGE UP
APTT BLD: 115.2 SEC — HIGH (ref 27.5–35.5)
APTT BLD: 118.2 SEC — HIGH (ref 27.5–35.5)
APTT BLD: 126.9 SEC — CRITICAL HIGH (ref 27.5–35.5)
APTT BLD: 133.3 SEC — CRITICAL HIGH (ref 27.5–35.5)
APTT BLD: 152.6 SEC — CRITICAL HIGH (ref 27.5–35.5)
APTT BLD: 157.8 SEC — CRITICAL HIGH (ref 27.5–35.5)
APTT BLD: 168.8 SEC — CRITICAL HIGH (ref 27.5–35.5)
APTT BLD: 21.5 SEC — LOW (ref 27.5–35.5)
APTT BLD: 28.5 SEC — SIGNIFICANT CHANGE UP (ref 27.5–35.5)
APTT BLD: 29.5 SEC — SIGNIFICANT CHANGE UP (ref 27.5–35.5)
APTT BLD: 29.8 SEC — SIGNIFICANT CHANGE UP (ref 27.5–35.5)
APTT BLD: 31.2 SEC — SIGNIFICANT CHANGE UP (ref 27.5–35.5)
APTT BLD: 31.7 SEC — SIGNIFICANT CHANGE UP (ref 27.5–35.5)
APTT BLD: 31.7 SEC — SIGNIFICANT CHANGE UP (ref 27.5–35.5)
APTT BLD: 32.3 SEC — SIGNIFICANT CHANGE UP (ref 27.5–35.5)
APTT BLD: 32.5 SEC — SIGNIFICANT CHANGE UP (ref 27.5–35.5)
APTT BLD: 32.5 SEC — SIGNIFICANT CHANGE UP (ref 27.5–35.5)
APTT BLD: 32.7 SEC — SIGNIFICANT CHANGE UP (ref 27.5–35.5)
APTT BLD: 33.2 SEC — SIGNIFICANT CHANGE UP (ref 27.5–35.5)
APTT BLD: 34.1 SEC — SIGNIFICANT CHANGE UP (ref 27.5–35.5)
APTT BLD: 34.5 SEC — SIGNIFICANT CHANGE UP (ref 27.5–35.5)
APTT BLD: 35.6 SEC — HIGH (ref 27.5–35.5)
APTT BLD: 36.2 SEC — HIGH (ref 27.5–35.5)
APTT BLD: 44 SEC — HIGH (ref 27.5–35.5)
APTT BLD: 46 SEC — HIGH (ref 27.5–35.5)
APTT BLD: 49 SEC — HIGH (ref 27.5–35.5)
APTT BLD: 49.1 SEC — HIGH (ref 27.5–35.5)
APTT BLD: 51 SEC — HIGH (ref 27.5–35.5)
APTT BLD: 51.8 SEC — HIGH (ref 27.5–35.5)
APTT BLD: 52.8 SEC — HIGH (ref 27.5–35.5)
APTT BLD: 52.9 SEC — HIGH (ref 27.5–35.5)
APTT BLD: 53.5 SEC — HIGH (ref 27.5–35.5)
APTT BLD: 55.5 SEC — HIGH (ref 27.5–35.5)
APTT BLD: 57.2 SEC — HIGH (ref 27.5–35.5)
APTT BLD: 59.1 SEC — HIGH (ref 27.5–35.5)
APTT BLD: 61.7 SEC — HIGH (ref 27.5–35.5)
APTT BLD: 63.2 SEC — HIGH (ref 27.5–35.5)
APTT BLD: 63.4 SEC — HIGH (ref 27.5–35.5)
APTT BLD: 65 SEC — HIGH (ref 27.5–35.5)
APTT BLD: 65.4 SEC — HIGH (ref 27.5–35.5)
APTT BLD: 67.8 SEC — HIGH (ref 27.5–35.5)
APTT BLD: 69.5 SEC — HIGH (ref 27.5–35.5)
APTT BLD: 69.6 SEC — HIGH (ref 27.5–35.5)
APTT BLD: 70.7 SEC — HIGH (ref 27.5–35.5)
APTT BLD: 70.7 SEC — HIGH (ref 27.5–35.5)
APTT BLD: 70.8 SEC — HIGH (ref 27.5–35.5)
APTT BLD: 71.9 SEC — HIGH (ref 27.5–35.5)
APTT BLD: 75.6 SEC — HIGH (ref 27.5–35.5)
APTT BLD: 80.7 SEC — HIGH (ref 27.5–35.5)
APTT BLD: 83.1 SEC — HIGH (ref 27.5–35.5)
APTT BLD: 85.6 SEC — HIGH (ref 27.5–35.5)
APTT BLD: 86.2 SEC — HIGH (ref 27.5–35.5)
APTT BLD: 86.7 SEC — HIGH (ref 27.5–35.5)
APTT BLD: 90.8 SEC — HIGH (ref 27.5–35.5)
APTT BLD: 94.7 SEC — HIGH (ref 27.5–35.5)
APTT BLD: 96.9 SEC — HIGH (ref 27.5–35.5)
APTT BLD: 97 SEC — HIGH (ref 27.5–35.5)
APTT BLD: >200 SEC — CRITICAL HIGH (ref 27.5–35.5)
AST SERPL-CCNC: 10 U/L — SIGNIFICANT CHANGE UP (ref 10–40)
AST SERPL-CCNC: 11 U/L — SIGNIFICANT CHANGE UP (ref 10–40)
AST SERPL-CCNC: 11 U/L — SIGNIFICANT CHANGE UP (ref 10–40)
AST SERPL-CCNC: 12 U/L — SIGNIFICANT CHANGE UP (ref 10–40)
AST SERPL-CCNC: 12 U/L — SIGNIFICANT CHANGE UP (ref 10–40)
AST SERPL-CCNC: 13 U/L — SIGNIFICANT CHANGE UP (ref 10–40)
AST SERPL-CCNC: 15 U/L — SIGNIFICANT CHANGE UP (ref 10–40)
AST SERPL-CCNC: 15 U/L — SIGNIFICANT CHANGE UP (ref 10–40)
AST SERPL-CCNC: 18 U/L — SIGNIFICANT CHANGE UP (ref 10–40)
AST SERPL-CCNC: 44 U/L — HIGH (ref 10–40)
AST SERPL-CCNC: 52 U/L — HIGH (ref 10–40)
AST SERPL-CCNC: 6 U/L — LOW (ref 10–40)
AST SERPL-CCNC: 8 U/L — LOW (ref 10–40)
AUTO DIFF PNL BLD: ABNORMAL
B-GLOBULIN SERPL ELPH-MCNC: 0.7 G/DL — SIGNIFICANT CHANGE UP (ref 0.5–1)
BACTERIA # UR AUTO: NEGATIVE — SIGNIFICANT CHANGE UP
BASE EXCESS BLDA CALC-SCNC: -0.2 MMOL/L — SIGNIFICANT CHANGE UP (ref -2–3)
BASE EXCESS BLDA CALC-SCNC: 1 MMOL/L — SIGNIFICANT CHANGE UP (ref -2–3)
BASE EXCESS BLDV CALC-SCNC: 0.5 MMOL/L — SIGNIFICANT CHANGE UP (ref -2–2)
BASE EXCESS BLDV CALC-SCNC: 4 MMOL/L — HIGH (ref -2–2)
BASE EXCESS BLDV CALC-SCNC: 7.8 MMOL/L — HIGH (ref -2–2)
BASE EXCESS BLDV CALC-SCNC: 8.9 MMOL/L — HIGH (ref -2–2)
BASOPHILS # BLD AUTO: 0.02 K/UL — SIGNIFICANT CHANGE UP (ref 0–0.2)
BASOPHILS # BLD AUTO: 0.03 K/UL — SIGNIFICANT CHANGE UP (ref 0–0.2)
BASOPHILS # BLD AUTO: 0.03 K/UL — SIGNIFICANT CHANGE UP (ref 0–0.2)
BASOPHILS # BLD AUTO: 0.04 K/UL — SIGNIFICANT CHANGE UP (ref 0–0.2)
BASOPHILS # BLD AUTO: 0.05 K/UL — SIGNIFICANT CHANGE UP (ref 0–0.2)
BASOPHILS # BLD AUTO: 0.06 K/UL — SIGNIFICANT CHANGE UP (ref 0–0.2)
BASOPHILS # BLD AUTO: 0.07 K/UL — SIGNIFICANT CHANGE UP (ref 0–0.2)
BASOPHILS # BLD AUTO: 0.07 K/UL — SIGNIFICANT CHANGE UP (ref 0–0.2)
BASOPHILS # BLD AUTO: 0.09 K/UL — SIGNIFICANT CHANGE UP (ref 0–0.2)
BASOPHILS # BLD AUTO: 0.11 K/UL — SIGNIFICANT CHANGE UP (ref 0–0.2)
BASOPHILS NFR BLD AUTO: 0.3 % — SIGNIFICANT CHANGE UP (ref 0–2)
BASOPHILS NFR BLD AUTO: 0.4 % — SIGNIFICANT CHANGE UP (ref 0–2)
BASOPHILS NFR BLD AUTO: 0.5 % — SIGNIFICANT CHANGE UP (ref 0–2)
BASOPHILS NFR BLD AUTO: 0.6 % — SIGNIFICANT CHANGE UP (ref 0–2)
BASOPHILS NFR BLD AUTO: 0.6 % — SIGNIFICANT CHANGE UP (ref 0–2)
BILIRUB SERPL-MCNC: 0.3 MG/DL — SIGNIFICANT CHANGE UP (ref 0.2–1.2)
BILIRUB SERPL-MCNC: 0.4 MG/DL — SIGNIFICANT CHANGE UP (ref 0.2–1.2)
BILIRUB SERPL-MCNC: 0.5 MG/DL — SIGNIFICANT CHANGE UP (ref 0.2–1.2)
BILIRUB SERPL-MCNC: 0.6 MG/DL — SIGNIFICANT CHANGE UP (ref 0.2–1.2)
BILIRUB SERPL-MCNC: 0.7 MG/DL — SIGNIFICANT CHANGE UP (ref 0.2–1.2)
BILIRUB UR-MCNC: NEGATIVE — SIGNIFICANT CHANGE UP
BLD GP AB SCN SERPL QL: NEGATIVE — SIGNIFICANT CHANGE UP
BLOOD GAS VENOUS - CREATININE: SIGNIFICANT CHANGE UP MG/DL (ref 0.5–1.3)
BUN SERPL-MCNC: 100 MG/DL — HIGH (ref 7–23)
BUN SERPL-MCNC: 101 MG/DL — HIGH (ref 7–23)
BUN SERPL-MCNC: 11 MG/DL — SIGNIFICANT CHANGE UP (ref 7–23)
BUN SERPL-MCNC: 119 MG/DL — HIGH (ref 7–23)
BUN SERPL-MCNC: 122 MG/DL — HIGH (ref 7–23)
BUN SERPL-MCNC: 14 MG/DL — SIGNIFICANT CHANGE UP (ref 7–23)
BUN SERPL-MCNC: 141 MG/DL — HIGH (ref 7–23)
BUN SERPL-MCNC: 144 MG/DL — HIGH (ref 7–23)
BUN SERPL-MCNC: 15 MG/DL — SIGNIFICANT CHANGE UP (ref 7–23)
BUN SERPL-MCNC: 15 MG/DL — SIGNIFICANT CHANGE UP (ref 7–23)
BUN SERPL-MCNC: 16 MG/DL — SIGNIFICANT CHANGE UP (ref 7–23)
BUN SERPL-MCNC: 17 MG/DL — SIGNIFICANT CHANGE UP (ref 7–23)
BUN SERPL-MCNC: 18 MG/DL — SIGNIFICANT CHANGE UP (ref 7–23)
BUN SERPL-MCNC: 20 MG/DL — SIGNIFICANT CHANGE UP (ref 7–23)
BUN SERPL-MCNC: 21 MG/DL — SIGNIFICANT CHANGE UP (ref 7–23)
BUN SERPL-MCNC: 22 MG/DL — SIGNIFICANT CHANGE UP (ref 7–23)
BUN SERPL-MCNC: 22 MG/DL — SIGNIFICANT CHANGE UP (ref 7–23)
BUN SERPL-MCNC: 25 MG/DL — HIGH (ref 7–23)
BUN SERPL-MCNC: 26 MG/DL — HIGH (ref 7–23)
BUN SERPL-MCNC: 28 MG/DL — HIGH (ref 7–23)
BUN SERPL-MCNC: 29 MG/DL — HIGH (ref 7–23)
BUN SERPL-MCNC: 30 MG/DL — HIGH (ref 7–23)
BUN SERPL-MCNC: 31 MG/DL — HIGH (ref 7–23)
BUN SERPL-MCNC: 33 MG/DL — HIGH (ref 7–23)
BUN SERPL-MCNC: 34 MG/DL — HIGH (ref 7–23)
BUN SERPL-MCNC: 35 MG/DL — HIGH (ref 7–23)
BUN SERPL-MCNC: 37 MG/DL — HIGH (ref 7–23)
BUN SERPL-MCNC: 37 MG/DL — HIGH (ref 7–23)
BUN SERPL-MCNC: 38 MG/DL — HIGH (ref 7–23)
BUN SERPL-MCNC: 39 MG/DL — HIGH (ref 7–23)
BUN SERPL-MCNC: 39 MG/DL — HIGH (ref 7–23)
BUN SERPL-MCNC: 40 MG/DL — HIGH (ref 7–23)
BUN SERPL-MCNC: 41 MG/DL — HIGH (ref 7–23)
BUN SERPL-MCNC: 41 MG/DL — HIGH (ref 7–23)
BUN SERPL-MCNC: 43 MG/DL — HIGH (ref 7–23)
BUN SERPL-MCNC: 44 MG/DL — HIGH (ref 7–23)
BUN SERPL-MCNC: 46 MG/DL — HIGH (ref 7–23)
BUN SERPL-MCNC: 47 MG/DL — HIGH (ref 7–23)
BUN SERPL-MCNC: 48 MG/DL — HIGH (ref 7–23)
BUN SERPL-MCNC: 51 MG/DL — HIGH (ref 7–23)
BUN SERPL-MCNC: 54 MG/DL — HIGH (ref 7–23)
BUN SERPL-MCNC: 58 MG/DL — HIGH (ref 7–23)
BUN SERPL-MCNC: 61 MG/DL — HIGH (ref 7–23)
BUN SERPL-MCNC: 61 MG/DL — HIGH (ref 7–23)
BUN SERPL-MCNC: 62 MG/DL — HIGH (ref 7–23)
BUN SERPL-MCNC: 64 MG/DL — HIGH (ref 7–23)
BUN SERPL-MCNC: 67 MG/DL — HIGH (ref 7–23)
BUN SERPL-MCNC: 67 MG/DL — HIGH (ref 7–23)
BUN SERPL-MCNC: 68 MG/DL — HIGH (ref 7–23)
BUN SERPL-MCNC: 68 MG/DL — HIGH (ref 7–23)
BUN SERPL-MCNC: 69 MG/DL — HIGH (ref 7–23)
BUN SERPL-MCNC: 69 MG/DL — HIGH (ref 7–23)
BUN SERPL-MCNC: 70 MG/DL — HIGH (ref 7–23)
BUN SERPL-MCNC: 70 MG/DL — HIGH (ref 7–23)
BUN SERPL-MCNC: 71 MG/DL — HIGH (ref 7–23)
BUN SERPL-MCNC: 71 MG/DL — HIGH (ref 7–23)
BUN SERPL-MCNC: 72 MG/DL — HIGH (ref 7–23)
BUN SERPL-MCNC: 76 MG/DL — HIGH (ref 7–23)
BUN SERPL-MCNC: 78 MG/DL — HIGH (ref 7–23)
BUN SERPL-MCNC: 79 MG/DL — HIGH (ref 7–23)
BUN SERPL-MCNC: 80 MG/DL — HIGH (ref 7–23)
BUN SERPL-MCNC: 80 MG/DL — HIGH (ref 7–23)
BUN SERPL-MCNC: 81 MG/DL — HIGH (ref 7–23)
BUN SERPL-MCNC: 84 MG/DL — HIGH (ref 7–23)
BUN SERPL-MCNC: 85 MG/DL — HIGH (ref 7–23)
C-ANCA SER-ACNC: NEGATIVE — SIGNIFICANT CHANGE UP
C3 SERPL-MCNC: 61 MG/DL — LOW (ref 81–157)
C4 SERPL-MCNC: 28 MG/DL — SIGNIFICANT CHANGE UP (ref 13–39)
CA-I SERPL-SCNC: 1.04 MMOL/L — LOW (ref 1.15–1.33)
CA-I SERPL-SCNC: 1.25 MMOL/L — SIGNIFICANT CHANGE UP (ref 1.15–1.33)
CA-I SERPL-SCNC: 1.25 MMOL/L — SIGNIFICANT CHANGE UP (ref 1.15–1.33)
CALCIUM SERPL-MCNC: 10.3 MG/DL — SIGNIFICANT CHANGE UP (ref 8.4–10.5)
CALCIUM SERPL-MCNC: 10.7 MG/DL — HIGH (ref 8.4–10.5)
CALCIUM SERPL-MCNC: 8 MG/DL — LOW (ref 8.4–10.5)
CALCIUM SERPL-MCNC: 8.1 MG/DL — LOW (ref 8.4–10.5)
CALCIUM SERPL-MCNC: 8.2 MG/DL — LOW (ref 8.4–10.5)
CALCIUM SERPL-MCNC: 8.3 MG/DL — LOW (ref 8.4–10.5)
CALCIUM SERPL-MCNC: 8.4 MG/DL — SIGNIFICANT CHANGE UP (ref 8.4–10.5)
CALCIUM SERPL-MCNC: 8.5 MG/DL — SIGNIFICANT CHANGE UP (ref 8.4–10.5)
CALCIUM SERPL-MCNC: 8.6 MG/DL — SIGNIFICANT CHANGE UP (ref 8.4–10.5)
CALCIUM SERPL-MCNC: 8.7 MG/DL — SIGNIFICANT CHANGE UP (ref 8.4–10.5)
CALCIUM SERPL-MCNC: 8.8 MG/DL — SIGNIFICANT CHANGE UP (ref 8.4–10.5)
CALCIUM SERPL-MCNC: 8.9 MG/DL — SIGNIFICANT CHANGE UP (ref 8.4–10.5)
CALCIUM SERPL-MCNC: 9 MG/DL — SIGNIFICANT CHANGE UP (ref 8.4–10.5)
CALCIUM SERPL-MCNC: 9.1 MG/DL — SIGNIFICANT CHANGE UP (ref 8.4–10.5)
CALCIUM SERPL-MCNC: 9.2 MG/DL — SIGNIFICANT CHANGE UP (ref 8.4–10.5)
CALCIUM SERPL-MCNC: 9.3 MG/DL — SIGNIFICANT CHANGE UP (ref 8.4–10.5)
CALCIUM SERPL-MCNC: 9.4 MG/DL — SIGNIFICANT CHANGE UP (ref 8.4–10.5)
CALCIUM SERPL-MCNC: 9.5 MG/DL — SIGNIFICANT CHANGE UP (ref 8.4–10.5)
CHLORIDE BLDV-SCNC: 92 MMOL/L — LOW (ref 96–108)
CHLORIDE BLDV-SCNC: 95 MMOL/L — LOW (ref 96–108)
CHLORIDE BLDV-SCNC: 98 MMOL/L — SIGNIFICANT CHANGE UP (ref 96–108)
CHLORIDE SERPL-SCNC: 100 MMOL/L — SIGNIFICANT CHANGE UP (ref 96–108)
CHLORIDE SERPL-SCNC: 100 MMOL/L — SIGNIFICANT CHANGE UP (ref 96–108)
CHLORIDE SERPL-SCNC: 101 MMOL/L — SIGNIFICANT CHANGE UP (ref 96–108)
CHLORIDE SERPL-SCNC: 102 MMOL/L — SIGNIFICANT CHANGE UP (ref 96–108)
CHLORIDE SERPL-SCNC: 87 MMOL/L — LOW (ref 96–108)
CHLORIDE SERPL-SCNC: 89 MMOL/L — LOW (ref 96–108)
CHLORIDE SERPL-SCNC: 89 MMOL/L — LOW (ref 96–108)
CHLORIDE SERPL-SCNC: 90 MMOL/L — LOW (ref 96–108)
CHLORIDE SERPL-SCNC: 90 MMOL/L — LOW (ref 96–108)
CHLORIDE SERPL-SCNC: 91 MMOL/L — LOW (ref 96–108)
CHLORIDE SERPL-SCNC: 92 MMOL/L — LOW (ref 96–108)
CHLORIDE SERPL-SCNC: 93 MMOL/L — LOW (ref 96–108)
CHLORIDE SERPL-SCNC: 94 MMOL/L — LOW (ref 96–108)
CHLORIDE SERPL-SCNC: 95 MMOL/L — LOW (ref 96–108)
CHLORIDE SERPL-SCNC: 96 MMOL/L — SIGNIFICANT CHANGE UP (ref 96–108)
CHLORIDE SERPL-SCNC: 97 MMOL/L — SIGNIFICANT CHANGE UP (ref 96–108)
CHLORIDE SERPL-SCNC: 98 MMOL/L — SIGNIFICANT CHANGE UP (ref 96–108)
CHLORIDE SERPL-SCNC: 99 MMOL/L — SIGNIFICANT CHANGE UP (ref 96–108)
CHLORIDE SERPL-SCNC: 99 MMOL/L — SIGNIFICANT CHANGE UP (ref 96–108)
CHLORIDE UR-SCNC: 64 MMOL/L — SIGNIFICANT CHANGE UP
CHLORIDE UR-SCNC: <20 MMOL/L — SIGNIFICANT CHANGE UP
CK MB BLD-MCNC: 10.3 % — HIGH (ref 0–3.5)
CK MB BLD-MCNC: 11.2 % — HIGH (ref 0–3.5)
CK MB CFR SERPL CALC: 2.1 NG/ML — SIGNIFICANT CHANGE UP (ref 0–6.7)
CK MB CFR SERPL CALC: 23.2 NG/ML — HIGH (ref 0–6.7)
CK MB CFR SERPL CALC: 30.3 NG/ML — HIGH (ref 0–6.7)
CK MB CFR SERPL CALC: 8.1 NG/ML — HIGH (ref 0–6.7)
CK SERPL-CCNC: 123 U/L — SIGNIFICANT CHANGE UP (ref 30–200)
CK SERPL-CCNC: 154 U/L — SIGNIFICANT CHANGE UP (ref 30–200)
CK SERPL-CCNC: 226 U/L — HIGH (ref 30–200)
CK SERPL-CCNC: 270 U/L — HIGH (ref 30–200)
CK SERPL-CCNC: 28 U/L — LOW (ref 30–200)
CK SERPL-CCNC: 88 U/L — SIGNIFICANT CHANGE UP (ref 30–200)
CO2 BLDA-SCNC: 25 MMOL/L — HIGH (ref 19–24)
CO2 BLDA-SCNC: 26 MMOL/L — HIGH (ref 19–24)
CO2 BLDV-SCNC: 31 MMOL/L — HIGH (ref 22–26)
CO2 BLDV-SCNC: 31 MMOL/L — HIGH (ref 22–26)
CO2 BLDV-SCNC: 36 MMOL/L — HIGH (ref 22–26)
CO2 BLDV-SCNC: 38 MMOL/L — HIGH (ref 22–26)
CO2 SERPL-SCNC: 16 MMOL/L — LOW (ref 22–31)
CO2 SERPL-SCNC: 17 MMOL/L — LOW (ref 22–31)
CO2 SERPL-SCNC: 19 MMOL/L — LOW (ref 22–31)
CO2 SERPL-SCNC: 20 MMOL/L — LOW (ref 22–31)
CO2 SERPL-SCNC: 21 MMOL/L — LOW (ref 22–31)
CO2 SERPL-SCNC: 22 MMOL/L — SIGNIFICANT CHANGE UP (ref 22–31)
CO2 SERPL-SCNC: 23 MMOL/L — SIGNIFICANT CHANGE UP (ref 22–31)
CO2 SERPL-SCNC: 24 MMOL/L — SIGNIFICANT CHANGE UP (ref 22–31)
CO2 SERPL-SCNC: 25 MMOL/L — SIGNIFICANT CHANGE UP (ref 22–31)
CO2 SERPL-SCNC: 26 MMOL/L — SIGNIFICANT CHANGE UP (ref 22–31)
CO2 SERPL-SCNC: 27 MMOL/L — SIGNIFICANT CHANGE UP (ref 22–31)
CO2 SERPL-SCNC: 27 MMOL/L — SIGNIFICANT CHANGE UP (ref 22–31)
CO2 SERPL-SCNC: 28 MMOL/L — SIGNIFICANT CHANGE UP (ref 22–31)
CO2 SERPL-SCNC: 29 MMOL/L — SIGNIFICANT CHANGE UP (ref 22–31)
COLOR SPEC: ABNORMAL
COLOR SPEC: SIGNIFICANT CHANGE UP
COLOR SPEC: SIGNIFICANT CHANGE UP
COMMENT - URINE: SIGNIFICANT CHANGE UP
CREAT ?TM UR-MCNC: 153 MG/DL — SIGNIFICANT CHANGE UP
CREAT ?TM UR-MCNC: 73 MG/DL — SIGNIFICANT CHANGE UP
CREAT SERPL-MCNC: 2.03 MG/DL — HIGH (ref 0.5–1.3)
CREAT SERPL-MCNC: 2.05 MG/DL — HIGH (ref 0.5–1.3)
CREAT SERPL-MCNC: 2.11 MG/DL — HIGH (ref 0.5–1.3)
CREAT SERPL-MCNC: 2.12 MG/DL — HIGH (ref 0.5–1.3)
CREAT SERPL-MCNC: 2.22 MG/DL — HIGH (ref 0.5–1.3)
CREAT SERPL-MCNC: 2.42 MG/DL — HIGH (ref 0.5–1.3)
CREAT SERPL-MCNC: 2.89 MG/DL — HIGH (ref 0.5–1.3)
CREAT SERPL-MCNC: 2.92 MG/DL — HIGH (ref 0.5–1.3)
CREAT SERPL-MCNC: 3 MG/DL — HIGH (ref 0.5–1.3)
CREAT SERPL-MCNC: 3.01 MG/DL — HIGH (ref 0.5–1.3)
CREAT SERPL-MCNC: 3.41 MG/DL — HIGH (ref 0.5–1.3)
CREAT SERPL-MCNC: 4.22 MG/DL — HIGH (ref 0.5–1.3)
CREAT SERPL-MCNC: 4.33 MG/DL — HIGH (ref 0.5–1.3)
CREAT SERPL-MCNC: 4.44 MG/DL — HIGH (ref 0.5–1.3)
CREAT SERPL-MCNC: 4.48 MG/DL — HIGH (ref 0.5–1.3)
CREAT SERPL-MCNC: 4.5 MG/DL — HIGH (ref 0.5–1.3)
CREAT SERPL-MCNC: 4.57 MG/DL — HIGH (ref 0.5–1.3)
CREAT SERPL-MCNC: 4.71 MG/DL — HIGH (ref 0.5–1.3)
CREAT SERPL-MCNC: 4.9 MG/DL — HIGH (ref 0.5–1.3)
CREAT SERPL-MCNC: 5.05 MG/DL — HIGH (ref 0.5–1.3)
CREAT SERPL-MCNC: 5.15 MG/DL — HIGH (ref 0.5–1.3)
CREAT SERPL-MCNC: 5.26 MG/DL — HIGH (ref 0.5–1.3)
CREAT SERPL-MCNC: 5.35 MG/DL — HIGH (ref 0.5–1.3)
CREAT SERPL-MCNC: 5.36 MG/DL — HIGH (ref 0.5–1.3)
CREAT SERPL-MCNC: 5.37 MG/DL — HIGH (ref 0.5–1.3)
CREAT SERPL-MCNC: 5.38 MG/DL — HIGH (ref 0.5–1.3)
CREAT SERPL-MCNC: 5.39 MG/DL — HIGH (ref 0.5–1.3)
CREAT SERPL-MCNC: 5.44 MG/DL — HIGH (ref 0.5–1.3)
CREAT SERPL-MCNC: 5.46 MG/DL — HIGH (ref 0.5–1.3)
CREAT SERPL-MCNC: 5.76 MG/DL — HIGH (ref 0.5–1.3)
CREAT SERPL-MCNC: 5.83 MG/DL — HIGH (ref 0.5–1.3)
CREAT SERPL-MCNC: 5.91 MG/DL — HIGH (ref 0.5–1.3)
CREAT SERPL-MCNC: 5.93 MG/DL — HIGH (ref 0.5–1.3)
CREAT SERPL-MCNC: 5.94 MG/DL — HIGH (ref 0.5–1.3)
CREAT SERPL-MCNC: 5.94 MG/DL — HIGH (ref 0.5–1.3)
CREAT SERPL-MCNC: 5.98 MG/DL — HIGH (ref 0.5–1.3)
CREAT SERPL-MCNC: 6.37 MG/DL — HIGH (ref 0.5–1.3)
CREAT SERPL-MCNC: 6.4 MG/DL — HIGH (ref 0.5–1.3)
CREAT SERPL-MCNC: 6.42 MG/DL — HIGH (ref 0.5–1.3)
CREAT SERPL-MCNC: 6.56 MG/DL — HIGH (ref 0.5–1.3)
CREAT SERPL-MCNC: 6.75 MG/DL — HIGH (ref 0.5–1.3)
CREAT SERPL-MCNC: 6.75 MG/DL — HIGH (ref 0.5–1.3)
CREAT SERPL-MCNC: 6.79 MG/DL — HIGH (ref 0.5–1.3)
CREAT SERPL-MCNC: 6.81 MG/DL — HIGH (ref 0.5–1.3)
CREAT SERPL-MCNC: 6.84 MG/DL — HIGH (ref 0.5–1.3)
CREAT SERPL-MCNC: 6.85 MG/DL — HIGH (ref 0.5–1.3)
CREAT SERPL-MCNC: 6.87 MG/DL — HIGH (ref 0.5–1.3)
CREAT SERPL-MCNC: 6.91 MG/DL — HIGH (ref 0.5–1.3)
CREAT SERPL-MCNC: 6.92 MG/DL — HIGH (ref 0.5–1.3)
CREAT SERPL-MCNC: 6.99 MG/DL — HIGH (ref 0.5–1.3)
CREAT SERPL-MCNC: 7.21 MG/DL — HIGH (ref 0.5–1.3)
CREAT SERPL-MCNC: 7.34 MG/DL — HIGH (ref 0.5–1.3)
CREAT SERPL-MCNC: 7.34 MG/DL — HIGH (ref 0.5–1.3)
CREAT SERPL-MCNC: 7.41 MG/DL — HIGH (ref 0.5–1.3)
CREAT SERPL-MCNC: 7.42 MG/DL — HIGH (ref 0.5–1.3)
CREAT SERPL-MCNC: 7.44 MG/DL — HIGH (ref 0.5–1.3)
CREAT SERPL-MCNC: 7.6 MG/DL — HIGH (ref 0.5–1.3)
CREAT SERPL-MCNC: 7.64 MG/DL — HIGH (ref 0.5–1.3)
CREAT SERPL-MCNC: 7.94 MG/DL — HIGH (ref 0.5–1.3)
CREAT SERPL-MCNC: 7.94 MG/DL — HIGH (ref 0.5–1.3)
CREAT SERPL-MCNC: 7.97 MG/DL — HIGH (ref 0.5–1.3)
CREAT SERPL-MCNC: 8.06 MG/DL — HIGH (ref 0.5–1.3)
CREAT SERPL-MCNC: 8.09 MG/DL — HIGH (ref 0.5–1.3)
CREAT SERPL-MCNC: 8.29 MG/DL — HIGH (ref 0.5–1.3)
CREAT SERPL-MCNC: 8.47 MG/DL — HIGH (ref 0.5–1.3)
CREAT SERPL-MCNC: 8.51 MG/DL — HIGH (ref 0.5–1.3)
CREAT SERPL-MCNC: 8.68 MG/DL — HIGH (ref 0.5–1.3)
CREAT SERPL-MCNC: 8.99 MG/DL — HIGH (ref 0.5–1.3)
CREAT SERPL-MCNC: 9.03 MG/DL — HIGH (ref 0.5–1.3)
CULTURE RESULTS: SIGNIFICANT CHANGE UP
DIFF PNL FLD: ABNORMAL
DIFF PNL FLD: NEGATIVE — SIGNIFICANT CHANGE UP
DIFF PNL FLD: NEGATIVE — SIGNIFICANT CHANGE UP
EGFR: 10 ML/MIN/1.73M2 — LOW
EGFR: 11 ML/MIN/1.73M2 — LOW
EGFR: 12 ML/MIN/1.73M2 — LOW
EGFR: 13 ML/MIN/1.73M2 — LOW
EGFR: 13 ML/MIN/1.73M2 — LOW
EGFR: 14 ML/MIN/1.73M2 — LOW
EGFR: 15 ML/MIN/1.73M2 — LOW
EGFR: 19 ML/MIN/1.73M2 — LOW
EGFR: 22 ML/MIN/1.73M2 — LOW
EGFR: 23 ML/MIN/1.73M2 — LOW
EGFR: 6 ML/MIN/1.73M2 — LOW
EGFR: 7 ML/MIN/1.73M2 — LOW
EGFR: 8 ML/MIN/1.73M2 — LOW
EGFR: 9 ML/MIN/1.73M2 — LOW
EOSINOPHIL # BLD AUTO: 0.09 K/UL — SIGNIFICANT CHANGE UP (ref 0–0.5)
EOSINOPHIL # BLD AUTO: 0.11 K/UL — SIGNIFICANT CHANGE UP (ref 0–0.5)
EOSINOPHIL # BLD AUTO: 0.12 K/UL — SIGNIFICANT CHANGE UP (ref 0–0.5)
EOSINOPHIL # BLD AUTO: 0.13 K/UL — SIGNIFICANT CHANGE UP (ref 0–0.5)
EOSINOPHIL # BLD AUTO: 0.14 K/UL — SIGNIFICANT CHANGE UP (ref 0–0.5)
EOSINOPHIL # BLD AUTO: 0.16 K/UL — SIGNIFICANT CHANGE UP (ref 0–0.5)
EOSINOPHIL # BLD AUTO: 0.18 K/UL — SIGNIFICANT CHANGE UP (ref 0–0.5)
EOSINOPHIL # BLD AUTO: 0.18 K/UL — SIGNIFICANT CHANGE UP (ref 0–0.5)
EOSINOPHIL # BLD AUTO: 0.23 K/UL — SIGNIFICANT CHANGE UP (ref 0–0.5)
EOSINOPHIL # BLD AUTO: 0.41 K/UL — SIGNIFICANT CHANGE UP (ref 0–0.5)
EOSINOPHIL NFR BLD AUTO: 0.5 % — SIGNIFICANT CHANGE UP (ref 0–6)
EOSINOPHIL NFR BLD AUTO: 0.5 % — SIGNIFICANT CHANGE UP (ref 0–6)
EOSINOPHIL NFR BLD AUTO: 0.9 % — SIGNIFICANT CHANGE UP (ref 0–6)
EOSINOPHIL NFR BLD AUTO: 1 % — SIGNIFICANT CHANGE UP (ref 0–6)
EOSINOPHIL NFR BLD AUTO: 1.1 % — SIGNIFICANT CHANGE UP (ref 0–6)
EOSINOPHIL NFR BLD AUTO: 1.3 % — SIGNIFICANT CHANGE UP (ref 0–6)
EOSINOPHIL NFR BLD AUTO: 1.4 % — SIGNIFICANT CHANGE UP (ref 0–6)
EOSINOPHIL NFR BLD AUTO: 1.7 % — SIGNIFICANT CHANGE UP (ref 0–6)
EOSINOPHIL NFR BLD AUTO: 1.8 % — SIGNIFICANT CHANGE UP (ref 0–6)
EOSINOPHIL NFR BLD AUTO: 2.1 % — SIGNIFICANT CHANGE UP (ref 0–6)
EOSINOPHIL NFR BLD AUTO: 2.4 % — SIGNIFICANT CHANGE UP (ref 0–6)
EOSINOPHIL NFR BLD AUTO: 3.9 % — SIGNIFICANT CHANGE UP (ref 0–6)
EPI CELLS # UR: 1 /HPF — SIGNIFICANT CHANGE UP
EPI CELLS # UR: 1 /HPF — SIGNIFICANT CHANGE UP
EPI CELLS # UR: 3 /HPF — SIGNIFICANT CHANGE UP
ESTIMATED AVERAGE GLUCOSE: 143 MG/DL — HIGH (ref 68–114)
ESTIMATED AVERAGE GLUCOSE: 243 MG/DL — HIGH (ref 68–114)
FERRITIN SERPL-MCNC: 120 NG/ML — SIGNIFICANT CHANGE UP (ref 30–400)
FERRITIN SERPL-MCNC: 152 NG/ML — SIGNIFICANT CHANGE UP (ref 30–400)
FERRITIN SERPL-MCNC: 172 NG/ML — SIGNIFICANT CHANGE UP (ref 30–400)
GAMMA GLOBULIN: 1.3 G/DL — SIGNIFICANT CHANGE UP (ref 0.6–1.6)
GAS PNL BLDA: SIGNIFICANT CHANGE UP
GAS PNL BLDV: 129 MMOL/L — LOW (ref 136–145)
GAS PNL BLDV: 134 MMOL/L — LOW (ref 136–145)
GAS PNL BLDV: 134 MMOL/L — LOW (ref 136–145)
GAS PNL BLDV: SIGNIFICANT CHANGE UP
GBM IGG SER-ACNC: 3 — SIGNIFICANT CHANGE UP (ref 0–20)
GLUCOSE BLDC GLUCOMTR-MCNC: 102 MG/DL — HIGH (ref 70–99)
GLUCOSE BLDC GLUCOMTR-MCNC: 102 MG/DL — HIGH (ref 70–99)
GLUCOSE BLDC GLUCOMTR-MCNC: 110 MG/DL — HIGH (ref 70–99)
GLUCOSE BLDC GLUCOMTR-MCNC: 116 MG/DL — HIGH (ref 70–99)
GLUCOSE BLDC GLUCOMTR-MCNC: 122 MG/DL — HIGH (ref 70–99)
GLUCOSE BLDC GLUCOMTR-MCNC: 123 MG/DL — HIGH (ref 70–99)
GLUCOSE BLDC GLUCOMTR-MCNC: 125 MG/DL — HIGH (ref 70–99)
GLUCOSE BLDC GLUCOMTR-MCNC: 129 MG/DL — HIGH (ref 70–99)
GLUCOSE BLDC GLUCOMTR-MCNC: 130 MG/DL — HIGH (ref 70–99)
GLUCOSE BLDC GLUCOMTR-MCNC: 131 MG/DL — HIGH (ref 70–99)
GLUCOSE BLDC GLUCOMTR-MCNC: 132 MG/DL — HIGH (ref 70–99)
GLUCOSE BLDC GLUCOMTR-MCNC: 134 MG/DL — HIGH (ref 70–99)
GLUCOSE BLDC GLUCOMTR-MCNC: 137 MG/DL — HIGH (ref 70–99)
GLUCOSE BLDC GLUCOMTR-MCNC: 138 MG/DL — HIGH (ref 70–99)
GLUCOSE BLDC GLUCOMTR-MCNC: 139 MG/DL — HIGH (ref 70–99)
GLUCOSE BLDC GLUCOMTR-MCNC: 140 MG/DL — HIGH (ref 70–99)
GLUCOSE BLDC GLUCOMTR-MCNC: 140 MG/DL — HIGH (ref 70–99)
GLUCOSE BLDC GLUCOMTR-MCNC: 141 MG/DL — HIGH (ref 70–99)
GLUCOSE BLDC GLUCOMTR-MCNC: 141 MG/DL — HIGH (ref 70–99)
GLUCOSE BLDC GLUCOMTR-MCNC: 143 MG/DL — HIGH (ref 70–99)
GLUCOSE BLDC GLUCOMTR-MCNC: 143 MG/DL — HIGH (ref 70–99)
GLUCOSE BLDC GLUCOMTR-MCNC: 144 MG/DL — HIGH (ref 70–99)
GLUCOSE BLDC GLUCOMTR-MCNC: 145 MG/DL — HIGH (ref 70–99)
GLUCOSE BLDC GLUCOMTR-MCNC: 147 MG/DL — HIGH (ref 70–99)
GLUCOSE BLDC GLUCOMTR-MCNC: 149 MG/DL — HIGH (ref 70–99)
GLUCOSE BLDC GLUCOMTR-MCNC: 150 MG/DL — HIGH (ref 70–99)
GLUCOSE BLDC GLUCOMTR-MCNC: 150 MG/DL — HIGH (ref 70–99)
GLUCOSE BLDC GLUCOMTR-MCNC: 151 MG/DL — HIGH (ref 70–99)
GLUCOSE BLDC GLUCOMTR-MCNC: 151 MG/DL — HIGH (ref 70–99)
GLUCOSE BLDC GLUCOMTR-MCNC: 152 MG/DL — HIGH (ref 70–99)
GLUCOSE BLDC GLUCOMTR-MCNC: 152 MG/DL — HIGH (ref 70–99)
GLUCOSE BLDC GLUCOMTR-MCNC: 153 MG/DL — HIGH (ref 70–99)
GLUCOSE BLDC GLUCOMTR-MCNC: 154 MG/DL — HIGH (ref 70–99)
GLUCOSE BLDC GLUCOMTR-MCNC: 155 MG/DL — HIGH (ref 70–99)
GLUCOSE BLDC GLUCOMTR-MCNC: 156 MG/DL — HIGH (ref 70–99)
GLUCOSE BLDC GLUCOMTR-MCNC: 158 MG/DL — HIGH (ref 70–99)
GLUCOSE BLDC GLUCOMTR-MCNC: 159 MG/DL — HIGH (ref 70–99)
GLUCOSE BLDC GLUCOMTR-MCNC: 160 MG/DL — HIGH (ref 70–99)
GLUCOSE BLDC GLUCOMTR-MCNC: 161 MG/DL — HIGH (ref 70–99)
GLUCOSE BLDC GLUCOMTR-MCNC: 161 MG/DL — HIGH (ref 70–99)
GLUCOSE BLDC GLUCOMTR-MCNC: 162 MG/DL — HIGH (ref 70–99)
GLUCOSE BLDC GLUCOMTR-MCNC: 163 MG/DL — HIGH (ref 70–99)
GLUCOSE BLDC GLUCOMTR-MCNC: 164 MG/DL — HIGH (ref 70–99)
GLUCOSE BLDC GLUCOMTR-MCNC: 164 MG/DL — HIGH (ref 70–99)
GLUCOSE BLDC GLUCOMTR-MCNC: 165 MG/DL — HIGH (ref 70–99)
GLUCOSE BLDC GLUCOMTR-MCNC: 165 MG/DL — HIGH (ref 70–99)
GLUCOSE BLDC GLUCOMTR-MCNC: 166 MG/DL — HIGH (ref 70–99)
GLUCOSE BLDC GLUCOMTR-MCNC: 167 MG/DL — HIGH (ref 70–99)
GLUCOSE BLDC GLUCOMTR-MCNC: 168 MG/DL — HIGH (ref 70–99)
GLUCOSE BLDC GLUCOMTR-MCNC: 169 MG/DL — HIGH (ref 70–99)
GLUCOSE BLDC GLUCOMTR-MCNC: 169 MG/DL — HIGH (ref 70–99)
GLUCOSE BLDC GLUCOMTR-MCNC: 170 MG/DL — HIGH (ref 70–99)
GLUCOSE BLDC GLUCOMTR-MCNC: 171 MG/DL — HIGH (ref 70–99)
GLUCOSE BLDC GLUCOMTR-MCNC: 171 MG/DL — HIGH (ref 70–99)
GLUCOSE BLDC GLUCOMTR-MCNC: 172 MG/DL — HIGH (ref 70–99)
GLUCOSE BLDC GLUCOMTR-MCNC: 172 MG/DL — HIGH (ref 70–99)
GLUCOSE BLDC GLUCOMTR-MCNC: 173 MG/DL — HIGH (ref 70–99)
GLUCOSE BLDC GLUCOMTR-MCNC: 173 MG/DL — HIGH (ref 70–99)
GLUCOSE BLDC GLUCOMTR-MCNC: 174 MG/DL — HIGH (ref 70–99)
GLUCOSE BLDC GLUCOMTR-MCNC: 174 MG/DL — HIGH (ref 70–99)
GLUCOSE BLDC GLUCOMTR-MCNC: 175 MG/DL — HIGH (ref 70–99)
GLUCOSE BLDC GLUCOMTR-MCNC: 175 MG/DL — HIGH (ref 70–99)
GLUCOSE BLDC GLUCOMTR-MCNC: 176 MG/DL — HIGH (ref 70–99)
GLUCOSE BLDC GLUCOMTR-MCNC: 176 MG/DL — HIGH (ref 70–99)
GLUCOSE BLDC GLUCOMTR-MCNC: 177 MG/DL — HIGH (ref 70–99)
GLUCOSE BLDC GLUCOMTR-MCNC: 178 MG/DL — HIGH (ref 70–99)
GLUCOSE BLDC GLUCOMTR-MCNC: 180 MG/DL — HIGH (ref 70–99)
GLUCOSE BLDC GLUCOMTR-MCNC: 181 MG/DL — HIGH (ref 70–99)
GLUCOSE BLDC GLUCOMTR-MCNC: 181 MG/DL — HIGH (ref 70–99)
GLUCOSE BLDC GLUCOMTR-MCNC: 182 MG/DL — HIGH (ref 70–99)
GLUCOSE BLDC GLUCOMTR-MCNC: 183 MG/DL — HIGH (ref 70–99)
GLUCOSE BLDC GLUCOMTR-MCNC: 183 MG/DL — HIGH (ref 70–99)
GLUCOSE BLDC GLUCOMTR-MCNC: 184 MG/DL — HIGH (ref 70–99)
GLUCOSE BLDC GLUCOMTR-MCNC: 185 MG/DL — HIGH (ref 70–99)
GLUCOSE BLDC GLUCOMTR-MCNC: 186 MG/DL — HIGH (ref 70–99)
GLUCOSE BLDC GLUCOMTR-MCNC: 187 MG/DL — HIGH (ref 70–99)
GLUCOSE BLDC GLUCOMTR-MCNC: 188 MG/DL — HIGH (ref 70–99)
GLUCOSE BLDC GLUCOMTR-MCNC: 189 MG/DL — HIGH (ref 70–99)
GLUCOSE BLDC GLUCOMTR-MCNC: 190 MG/DL — HIGH (ref 70–99)
GLUCOSE BLDC GLUCOMTR-MCNC: 190 MG/DL — HIGH (ref 70–99)
GLUCOSE BLDC GLUCOMTR-MCNC: 191 MG/DL — HIGH (ref 70–99)
GLUCOSE BLDC GLUCOMTR-MCNC: 192 MG/DL — HIGH (ref 70–99)
GLUCOSE BLDC GLUCOMTR-MCNC: 193 MG/DL — HIGH (ref 70–99)
GLUCOSE BLDC GLUCOMTR-MCNC: 193 MG/DL — HIGH (ref 70–99)
GLUCOSE BLDC GLUCOMTR-MCNC: 194 MG/DL — HIGH (ref 70–99)
GLUCOSE BLDC GLUCOMTR-MCNC: 194 MG/DL — HIGH (ref 70–99)
GLUCOSE BLDC GLUCOMTR-MCNC: 195 MG/DL — HIGH (ref 70–99)
GLUCOSE BLDC GLUCOMTR-MCNC: 196 MG/DL — HIGH (ref 70–99)
GLUCOSE BLDC GLUCOMTR-MCNC: 197 MG/DL — HIGH (ref 70–99)
GLUCOSE BLDC GLUCOMTR-MCNC: 198 MG/DL — HIGH (ref 70–99)
GLUCOSE BLDC GLUCOMTR-MCNC: 199 MG/DL — HIGH (ref 70–99)
GLUCOSE BLDC GLUCOMTR-MCNC: 199 MG/DL — HIGH (ref 70–99)
GLUCOSE BLDC GLUCOMTR-MCNC: 200 MG/DL — HIGH (ref 70–99)
GLUCOSE BLDC GLUCOMTR-MCNC: 202 MG/DL — HIGH (ref 70–99)
GLUCOSE BLDC GLUCOMTR-MCNC: 203 MG/DL — HIGH (ref 70–99)
GLUCOSE BLDC GLUCOMTR-MCNC: 205 MG/DL — HIGH (ref 70–99)
GLUCOSE BLDC GLUCOMTR-MCNC: 205 MG/DL — HIGH (ref 70–99)
GLUCOSE BLDC GLUCOMTR-MCNC: 206 MG/DL — HIGH (ref 70–99)
GLUCOSE BLDC GLUCOMTR-MCNC: 207 MG/DL — HIGH (ref 70–99)
GLUCOSE BLDC GLUCOMTR-MCNC: 208 MG/DL — HIGH (ref 70–99)
GLUCOSE BLDC GLUCOMTR-MCNC: 209 MG/DL — HIGH (ref 70–99)
GLUCOSE BLDC GLUCOMTR-MCNC: 210 MG/DL — HIGH (ref 70–99)
GLUCOSE BLDC GLUCOMTR-MCNC: 210 MG/DL — HIGH (ref 70–99)
GLUCOSE BLDC GLUCOMTR-MCNC: 211 MG/DL — HIGH (ref 70–99)
GLUCOSE BLDC GLUCOMTR-MCNC: 212 MG/DL — HIGH (ref 70–99)
GLUCOSE BLDC GLUCOMTR-MCNC: 212 MG/DL — HIGH (ref 70–99)
GLUCOSE BLDC GLUCOMTR-MCNC: 213 MG/DL — HIGH (ref 70–99)
GLUCOSE BLDC GLUCOMTR-MCNC: 213 MG/DL — HIGH (ref 70–99)
GLUCOSE BLDC GLUCOMTR-MCNC: 214 MG/DL — HIGH (ref 70–99)
GLUCOSE BLDC GLUCOMTR-MCNC: 214 MG/DL — HIGH (ref 70–99)
GLUCOSE BLDC GLUCOMTR-MCNC: 215 MG/DL — HIGH (ref 70–99)
GLUCOSE BLDC GLUCOMTR-MCNC: 216 MG/DL — HIGH (ref 70–99)
GLUCOSE BLDC GLUCOMTR-MCNC: 217 MG/DL — HIGH (ref 70–99)
GLUCOSE BLDC GLUCOMTR-MCNC: 218 MG/DL — HIGH (ref 70–99)
GLUCOSE BLDC GLUCOMTR-MCNC: 218 MG/DL — HIGH (ref 70–99)
GLUCOSE BLDC GLUCOMTR-MCNC: 219 MG/DL — HIGH (ref 70–99)
GLUCOSE BLDC GLUCOMTR-MCNC: 219 MG/DL — HIGH (ref 70–99)
GLUCOSE BLDC GLUCOMTR-MCNC: 220 MG/DL — HIGH (ref 70–99)
GLUCOSE BLDC GLUCOMTR-MCNC: 221 MG/DL — HIGH (ref 70–99)
GLUCOSE BLDC GLUCOMTR-MCNC: 221 MG/DL — HIGH (ref 70–99)
GLUCOSE BLDC GLUCOMTR-MCNC: 222 MG/DL — HIGH (ref 70–99)
GLUCOSE BLDC GLUCOMTR-MCNC: 223 MG/DL — HIGH (ref 70–99)
GLUCOSE BLDC GLUCOMTR-MCNC: 224 MG/DL — HIGH (ref 70–99)
GLUCOSE BLDC GLUCOMTR-MCNC: 225 MG/DL — HIGH (ref 70–99)
GLUCOSE BLDC GLUCOMTR-MCNC: 225 MG/DL — HIGH (ref 70–99)
GLUCOSE BLDC GLUCOMTR-MCNC: 226 MG/DL — HIGH (ref 70–99)
GLUCOSE BLDC GLUCOMTR-MCNC: 227 MG/DL — HIGH (ref 70–99)
GLUCOSE BLDC GLUCOMTR-MCNC: 229 MG/DL — HIGH (ref 70–99)
GLUCOSE BLDC GLUCOMTR-MCNC: 230 MG/DL — HIGH (ref 70–99)
GLUCOSE BLDC GLUCOMTR-MCNC: 231 MG/DL — HIGH (ref 70–99)
GLUCOSE BLDC GLUCOMTR-MCNC: 231 MG/DL — HIGH (ref 70–99)
GLUCOSE BLDC GLUCOMTR-MCNC: 232 MG/DL — HIGH (ref 70–99)
GLUCOSE BLDC GLUCOMTR-MCNC: 234 MG/DL — HIGH (ref 70–99)
GLUCOSE BLDC GLUCOMTR-MCNC: 235 MG/DL — HIGH (ref 70–99)
GLUCOSE BLDC GLUCOMTR-MCNC: 238 MG/DL — HIGH (ref 70–99)
GLUCOSE BLDC GLUCOMTR-MCNC: 238 MG/DL — HIGH (ref 70–99)
GLUCOSE BLDC GLUCOMTR-MCNC: 239 MG/DL — HIGH (ref 70–99)
GLUCOSE BLDC GLUCOMTR-MCNC: 240 MG/DL — HIGH (ref 70–99)
GLUCOSE BLDC GLUCOMTR-MCNC: 241 MG/DL — HIGH (ref 70–99)
GLUCOSE BLDC GLUCOMTR-MCNC: 241 MG/DL — HIGH (ref 70–99)
GLUCOSE BLDC GLUCOMTR-MCNC: 242 MG/DL — HIGH (ref 70–99)
GLUCOSE BLDC GLUCOMTR-MCNC: 242 MG/DL — HIGH (ref 70–99)
GLUCOSE BLDC GLUCOMTR-MCNC: 244 MG/DL — HIGH (ref 70–99)
GLUCOSE BLDC GLUCOMTR-MCNC: 245 MG/DL — HIGH (ref 70–99)
GLUCOSE BLDC GLUCOMTR-MCNC: 245 MG/DL — HIGH (ref 70–99)
GLUCOSE BLDC GLUCOMTR-MCNC: 247 MG/DL — HIGH (ref 70–99)
GLUCOSE BLDC GLUCOMTR-MCNC: 247 MG/DL — HIGH (ref 70–99)
GLUCOSE BLDC GLUCOMTR-MCNC: 251 MG/DL — HIGH (ref 70–99)
GLUCOSE BLDC GLUCOMTR-MCNC: 251 MG/DL — HIGH (ref 70–99)
GLUCOSE BLDC GLUCOMTR-MCNC: 252 MG/DL — HIGH (ref 70–99)
GLUCOSE BLDC GLUCOMTR-MCNC: 254 MG/DL — HIGH (ref 70–99)
GLUCOSE BLDC GLUCOMTR-MCNC: 258 MG/DL — HIGH (ref 70–99)
GLUCOSE BLDC GLUCOMTR-MCNC: 259 MG/DL — HIGH (ref 70–99)
GLUCOSE BLDC GLUCOMTR-MCNC: 261 MG/DL — HIGH (ref 70–99)
GLUCOSE BLDC GLUCOMTR-MCNC: 265 MG/DL — HIGH (ref 70–99)
GLUCOSE BLDC GLUCOMTR-MCNC: 266 MG/DL — HIGH (ref 70–99)
GLUCOSE BLDC GLUCOMTR-MCNC: 271 MG/DL — HIGH (ref 70–99)
GLUCOSE BLDC GLUCOMTR-MCNC: 273 MG/DL — HIGH (ref 70–99)
GLUCOSE BLDC GLUCOMTR-MCNC: 274 MG/DL — HIGH (ref 70–99)
GLUCOSE BLDC GLUCOMTR-MCNC: 276 MG/DL — HIGH (ref 70–99)
GLUCOSE BLDC GLUCOMTR-MCNC: 280 MG/DL — HIGH (ref 70–99)
GLUCOSE BLDC GLUCOMTR-MCNC: 283 MG/DL — HIGH (ref 70–99)
GLUCOSE BLDC GLUCOMTR-MCNC: 284 MG/DL — HIGH (ref 70–99)
GLUCOSE BLDC GLUCOMTR-MCNC: 308 MG/DL — HIGH (ref 70–99)
GLUCOSE BLDC GLUCOMTR-MCNC: 315 MG/DL — HIGH (ref 70–99)
GLUCOSE BLDC GLUCOMTR-MCNC: 317 MG/DL — HIGH (ref 70–99)
GLUCOSE BLDC GLUCOMTR-MCNC: 322 MG/DL — HIGH (ref 70–99)
GLUCOSE BLDC GLUCOMTR-MCNC: 337 MG/DL — HIGH (ref 70–99)
GLUCOSE BLDC GLUCOMTR-MCNC: 401 MG/DL — HIGH (ref 70–99)
GLUCOSE BLDC GLUCOMTR-MCNC: 92 MG/DL — SIGNIFICANT CHANGE UP (ref 70–99)
GLUCOSE BLDC GLUCOMTR-MCNC: 92 MG/DL — SIGNIFICANT CHANGE UP (ref 70–99)
GLUCOSE BLDV-MCNC: 214 MG/DL — HIGH (ref 70–99)
GLUCOSE BLDV-MCNC: 234 MG/DL — HIGH (ref 70–99)
GLUCOSE BLDV-MCNC: 81 MG/DL — SIGNIFICANT CHANGE UP (ref 70–99)
GLUCOSE SERPL-MCNC: 106 MG/DL — HIGH (ref 70–99)
GLUCOSE SERPL-MCNC: 110 MG/DL — HIGH (ref 70–99)
GLUCOSE SERPL-MCNC: 111 MG/DL — HIGH (ref 70–99)
GLUCOSE SERPL-MCNC: 121 MG/DL — HIGH (ref 70–99)
GLUCOSE SERPL-MCNC: 125 MG/DL — HIGH (ref 70–99)
GLUCOSE SERPL-MCNC: 130 MG/DL — HIGH (ref 70–99)
GLUCOSE SERPL-MCNC: 143 MG/DL — HIGH (ref 70–99)
GLUCOSE SERPL-MCNC: 144 MG/DL — HIGH (ref 70–99)
GLUCOSE SERPL-MCNC: 146 MG/DL — HIGH (ref 70–99)
GLUCOSE SERPL-MCNC: 147 MG/DL — HIGH (ref 70–99)
GLUCOSE SERPL-MCNC: 150 MG/DL — HIGH (ref 70–99)
GLUCOSE SERPL-MCNC: 151 MG/DL — HIGH (ref 70–99)
GLUCOSE SERPL-MCNC: 153 MG/DL — HIGH (ref 70–99)
GLUCOSE SERPL-MCNC: 157 MG/DL — HIGH (ref 70–99)
GLUCOSE SERPL-MCNC: 157 MG/DL — HIGH (ref 70–99)
GLUCOSE SERPL-MCNC: 159 MG/DL — HIGH (ref 70–99)
GLUCOSE SERPL-MCNC: 164 MG/DL — HIGH (ref 70–99)
GLUCOSE SERPL-MCNC: 166 MG/DL — HIGH (ref 70–99)
GLUCOSE SERPL-MCNC: 167 MG/DL — HIGH (ref 70–99)
GLUCOSE SERPL-MCNC: 167 MG/DL — HIGH (ref 70–99)
GLUCOSE SERPL-MCNC: 168 MG/DL — HIGH (ref 70–99)
GLUCOSE SERPL-MCNC: 171 MG/DL — HIGH (ref 70–99)
GLUCOSE SERPL-MCNC: 171 MG/DL — HIGH (ref 70–99)
GLUCOSE SERPL-MCNC: 172 MG/DL — HIGH (ref 70–99)
GLUCOSE SERPL-MCNC: 174 MG/DL — HIGH (ref 70–99)
GLUCOSE SERPL-MCNC: 174 MG/DL — HIGH (ref 70–99)
GLUCOSE SERPL-MCNC: 175 MG/DL — HIGH (ref 70–99)
GLUCOSE SERPL-MCNC: 177 MG/DL — HIGH (ref 70–99)
GLUCOSE SERPL-MCNC: 178 MG/DL — HIGH (ref 70–99)
GLUCOSE SERPL-MCNC: 178 MG/DL — HIGH (ref 70–99)
GLUCOSE SERPL-MCNC: 179 MG/DL — HIGH (ref 70–99)
GLUCOSE SERPL-MCNC: 180 MG/DL — HIGH (ref 70–99)
GLUCOSE SERPL-MCNC: 180 MG/DL — HIGH (ref 70–99)
GLUCOSE SERPL-MCNC: 183 MG/DL — HIGH (ref 70–99)
GLUCOSE SERPL-MCNC: 186 MG/DL — HIGH (ref 70–99)
GLUCOSE SERPL-MCNC: 188 MG/DL — HIGH (ref 70–99)
GLUCOSE SERPL-MCNC: 188 MG/DL — HIGH (ref 70–99)
GLUCOSE SERPL-MCNC: 189 MG/DL — HIGH (ref 70–99)
GLUCOSE SERPL-MCNC: 190 MG/DL — HIGH (ref 70–99)
GLUCOSE SERPL-MCNC: 192 MG/DL — HIGH (ref 70–99)
GLUCOSE SERPL-MCNC: 195 MG/DL — HIGH (ref 70–99)
GLUCOSE SERPL-MCNC: 196 MG/DL — HIGH (ref 70–99)
GLUCOSE SERPL-MCNC: 198 MG/DL — HIGH (ref 70–99)
GLUCOSE SERPL-MCNC: 198 MG/DL — HIGH (ref 70–99)
GLUCOSE SERPL-MCNC: 200 MG/DL — HIGH (ref 70–99)
GLUCOSE SERPL-MCNC: 204 MG/DL — HIGH (ref 70–99)
GLUCOSE SERPL-MCNC: 205 MG/DL — HIGH (ref 70–99)
GLUCOSE SERPL-MCNC: 207 MG/DL — HIGH (ref 70–99)
GLUCOSE SERPL-MCNC: 207 MG/DL — HIGH (ref 70–99)
GLUCOSE SERPL-MCNC: 208 MG/DL — HIGH (ref 70–99)
GLUCOSE SERPL-MCNC: 209 MG/DL — HIGH (ref 70–99)
GLUCOSE SERPL-MCNC: 214 MG/DL — HIGH (ref 70–99)
GLUCOSE SERPL-MCNC: 215 MG/DL — HIGH (ref 70–99)
GLUCOSE SERPL-MCNC: 223 MG/DL — HIGH (ref 70–99)
GLUCOSE SERPL-MCNC: 225 MG/DL — HIGH (ref 70–99)
GLUCOSE SERPL-MCNC: 227 MG/DL — HIGH (ref 70–99)
GLUCOSE SERPL-MCNC: 229 MG/DL — HIGH (ref 70–99)
GLUCOSE SERPL-MCNC: 232 MG/DL — HIGH (ref 70–99)
GLUCOSE SERPL-MCNC: 232 MG/DL — HIGH (ref 70–99)
GLUCOSE SERPL-MCNC: 238 MG/DL — HIGH (ref 70–99)
GLUCOSE SERPL-MCNC: 242 MG/DL — HIGH (ref 70–99)
GLUCOSE SERPL-MCNC: 244 MG/DL — HIGH (ref 70–99)
GLUCOSE SERPL-MCNC: 252 MG/DL — HIGH (ref 70–99)
GLUCOSE SERPL-MCNC: 256 MG/DL — HIGH (ref 70–99)
GLUCOSE SERPL-MCNC: 270 MG/DL — HIGH (ref 70–99)
GLUCOSE SERPL-MCNC: 99 MG/DL — SIGNIFICANT CHANGE UP (ref 70–99)
GLUCOSE UR QL: NEGATIVE — SIGNIFICANT CHANGE UP
GRAM STN FLD: SIGNIFICANT CHANGE UP
HAPTOGLOB SERPL-MCNC: 239 MG/DL — HIGH (ref 34–200)
HAV IGM SER-ACNC: SIGNIFICANT CHANGE UP
HBV CORE AB SER-ACNC: SIGNIFICANT CHANGE UP
HBV CORE IGM SER-ACNC: SIGNIFICANT CHANGE UP
HBV SURFACE AB SER-ACNC: 3.1 MIU/ML — LOW
HBV SURFACE AB SER-ACNC: 3.2 MIU/ML — LOW
HBV SURFACE AB SER-ACNC: <3 MIU/ML — LOW
HBV SURFACE AB SER-ACNC: <3 MIU/ML — LOW
HBV SURFACE AB SER-ACNC: SIGNIFICANT CHANGE UP
HBV SURFACE AG SER-ACNC: SIGNIFICANT CHANGE UP
HCO3 BLDA-SCNC: 24 MMOL/L — SIGNIFICANT CHANGE UP (ref 21–28)
HCO3 BLDA-SCNC: 25 MMOL/L — SIGNIFICANT CHANGE UP (ref 21–28)
HCO3 BLDV-SCNC: 29 MMOL/L — SIGNIFICANT CHANGE UP (ref 22–29)
HCO3 BLDV-SCNC: 29 MMOL/L — SIGNIFICANT CHANGE UP (ref 22–29)
HCO3 BLDV-SCNC: 35 MMOL/L — HIGH (ref 22–29)
HCO3 BLDV-SCNC: 36 MMOL/L — HIGH (ref 22–29)
HCT VFR BLD CALC: 20.7 % — CRITICAL LOW (ref 39–50)
HCT VFR BLD CALC: 21.6 % — LOW (ref 39–50)
HCT VFR BLD CALC: 22.3 % — LOW (ref 39–50)
HCT VFR BLD CALC: 22.5 % — LOW (ref 39–50)
HCT VFR BLD CALC: 23.5 % — LOW (ref 39–50)
HCT VFR BLD CALC: 23.5 % — LOW (ref 39–50)
HCT VFR BLD CALC: 23.9 % — LOW (ref 39–50)
HCT VFR BLD CALC: 24 % — LOW (ref 39–50)
HCT VFR BLD CALC: 24.1 % — LOW (ref 39–50)
HCT VFR BLD CALC: 24.4 % — LOW (ref 39–50)
HCT VFR BLD CALC: 24.7 % — LOW (ref 39–50)
HCT VFR BLD CALC: 24.8 % — LOW (ref 39–50)
HCT VFR BLD CALC: 25.4 % — LOW (ref 39–50)
HCT VFR BLD CALC: 25.6 % — LOW (ref 39–50)
HCT VFR BLD CALC: 25.7 % — LOW (ref 39–50)
HCT VFR BLD CALC: 26 % — LOW (ref 39–50)
HCT VFR BLD CALC: 26.2 % — LOW (ref 39–50)
HCT VFR BLD CALC: 26.8 % — LOW (ref 39–50)
HCT VFR BLD CALC: 26.9 % — LOW (ref 39–50)
HCT VFR BLD CALC: 27 % — LOW (ref 39–50)
HCT VFR BLD CALC: 27.3 % — LOW (ref 39–50)
HCT VFR BLD CALC: 27.5 % — LOW (ref 39–50)
HCT VFR BLD CALC: 27.6 % — LOW (ref 39–50)
HCT VFR BLD CALC: 28 % — LOW (ref 39–50)
HCT VFR BLD CALC: 28.1 % — LOW (ref 39–50)
HCT VFR BLD CALC: 28.2 % — LOW (ref 39–50)
HCT VFR BLD CALC: 28.2 % — LOW (ref 39–50)
HCT VFR BLD CALC: 28.5 % — LOW (ref 39–50)
HCT VFR BLD CALC: 28.5 % — LOW (ref 39–50)
HCT VFR BLD CALC: 28.6 % — LOW (ref 39–50)
HCT VFR BLD CALC: 28.8 % — LOW (ref 39–50)
HCT VFR BLD CALC: 28.9 % — LOW (ref 39–50)
HCT VFR BLD CALC: 28.9 % — LOW (ref 39–50)
HCT VFR BLD CALC: 29 % — LOW (ref 39–50)
HCT VFR BLD CALC: 29 % — LOW (ref 39–50)
HCT VFR BLD CALC: 29.4 % — LOW (ref 39–50)
HCT VFR BLD CALC: 29.5 % — LOW (ref 39–50)
HCT VFR BLD CALC: 29.8 % — LOW (ref 39–50)
HCT VFR BLD CALC: 29.9 % — LOW (ref 39–50)
HCT VFR BLD CALC: 30.1 % — LOW (ref 39–50)
HCT VFR BLD CALC: 30.2 % — LOW (ref 39–50)
HCT VFR BLD CALC: 30.5 % — LOW (ref 39–50)
HCT VFR BLD CALC: 30.5 % — LOW (ref 39–50)
HCT VFR BLD CALC: 30.6 % — LOW (ref 39–50)
HCT VFR BLD CALC: 30.7 % — LOW (ref 39–50)
HCT VFR BLD CALC: 31.1 % — LOW (ref 39–50)
HCT VFR BLD CALC: 31.2 % — LOW (ref 39–50)
HCT VFR BLD CALC: 31.5 % — LOW (ref 39–50)
HCT VFR BLD CALC: 31.6 % — LOW (ref 39–50)
HCT VFR BLD CALC: 32.1 % — LOW (ref 39–50)
HCT VFR BLD CALC: 32.3 % — LOW (ref 39–50)
HCT VFR BLD CALC: 32.4 % — LOW (ref 39–50)
HCT VFR BLD CALC: 32.9 % — LOW (ref 39–50)
HCT VFR BLD CALC: 33.1 % — LOW (ref 39–50)
HCT VFR BLD CALC: 33.2 % — LOW (ref 39–50)
HCT VFR BLD CALC: 33.5 % — LOW (ref 39–50)
HCT VFR BLD CALC: 33.7 % — LOW (ref 39–50)
HCT VFR BLD CALC: 33.9 % — LOW (ref 39–50)
HCT VFR BLD CALC: 33.9 % — LOW (ref 39–50)
HCT VFR BLD CALC: 34.4 % — LOW (ref 39–50)
HCT VFR BLD CALC: 34.5 % — LOW (ref 39–50)
HCT VFR BLD CALC: 34.7 % — LOW (ref 39–50)
HCT VFR BLD CALC: 34.9 % — LOW (ref 39–50)
HCT VFR BLD CALC: 35 % — LOW (ref 39–50)
HCT VFR BLD CALC: 35.3 % — LOW (ref 39–50)
HCT VFR BLD CALC: 35.7 % — LOW (ref 39–50)
HCT VFR BLD CALC: 35.8 % — LOW (ref 39–50)
HCT VFR BLD CALC: 36.9 % — LOW (ref 39–50)
HCT VFR BLD CALC: 37.1 % — LOW (ref 39–50)
HCT VFR BLD CALC: 37.3 % — LOW (ref 39–50)
HCT VFR BLD CALC: 37.4 % — LOW (ref 39–50)
HCT VFR BLD CALC: 37.5 % — LOW (ref 39–50)
HCT VFR BLD CALC: 40.2 % — SIGNIFICANT CHANGE UP (ref 39–50)
HCT VFR BLD CALC: 41.8 % — SIGNIFICANT CHANGE UP (ref 39–50)
HCT VFR BLD CALC: 41.8 % — SIGNIFICANT CHANGE UP (ref 39–50)
HCT VFR BLD CALC: 42.2 % — SIGNIFICANT CHANGE UP (ref 39–50)
HCT VFR BLD CALC: 42.8 % — SIGNIFICANT CHANGE UP (ref 39–50)
HCT VFR BLD CALC: 42.8 % — SIGNIFICANT CHANGE UP (ref 39–50)
HCT VFR BLD CALC: 43.2 % — SIGNIFICANT CHANGE UP (ref 39–50)
HCT VFR BLD CALC: 43.3 % — SIGNIFICANT CHANGE UP (ref 39–50)
HCT VFR BLD CALC: 43.7 % — SIGNIFICANT CHANGE UP (ref 39–50)
HCT VFR BLD CALC: 44.3 % — SIGNIFICANT CHANGE UP (ref 39–50)
HCT VFR BLD CALC: 45.2 % — SIGNIFICANT CHANGE UP (ref 39–50)
HCT VFR BLD CALC: 46.1 % — SIGNIFICANT CHANGE UP (ref 39–50)
HCT VFR BLD CALC: 47.9 % — SIGNIFICANT CHANGE UP (ref 39–50)
HCT VFR BLD CALC: 48.9 % — SIGNIFICANT CHANGE UP (ref 39–50)
HCT VFR BLDA CALC: 40 % — SIGNIFICANT CHANGE UP (ref 39–51)
HCT VFR BLDA CALC: 43 % — SIGNIFICANT CHANGE UP (ref 39–51)
HCT VFR BLDA CALC: 45 % — SIGNIFICANT CHANGE UP (ref 39–51)
HCV AB S/CO SERPL IA: 0.07 S/CO — SIGNIFICANT CHANGE UP (ref 0–0.99)
HCV AB S/CO SERPL IA: 0.08 S/CO — SIGNIFICANT CHANGE UP (ref 0–0.99)
HCV AB S/CO SERPL IA: 0.09 S/CO — SIGNIFICANT CHANGE UP (ref 0–0.99)
HCV AB S/CO SERPL IA: 0.1 S/CO — SIGNIFICANT CHANGE UP (ref 0–0.99)
HCV AB S/CO SERPL IA: 0.15 S/CO — SIGNIFICANT CHANGE UP (ref 0–0.99)
HCV AB SERPL-IMP: SIGNIFICANT CHANGE UP
HGB BLD CALC-MCNC: 13.4 G/DL — SIGNIFICANT CHANGE UP (ref 12.6–17.4)
HGB BLD CALC-MCNC: 14.4 G/DL — SIGNIFICANT CHANGE UP (ref 12.6–17.4)
HGB BLD CALC-MCNC: 14.9 G/DL — SIGNIFICANT CHANGE UP (ref 12.6–17.4)
HGB BLD-MCNC: 10 G/DL — LOW (ref 13–17)
HGB BLD-MCNC: 10 G/DL — LOW (ref 13–17)
HGB BLD-MCNC: 10.1 G/DL — LOW (ref 13–17)
HGB BLD-MCNC: 10.1 G/DL — LOW (ref 13–17)
HGB BLD-MCNC: 10.2 G/DL — LOW (ref 13–17)
HGB BLD-MCNC: 10.2 G/DL — LOW (ref 13–17)
HGB BLD-MCNC: 10.4 G/DL — LOW (ref 13–17)
HGB BLD-MCNC: 10.6 G/DL — LOW (ref 13–17)
HGB BLD-MCNC: 10.6 G/DL — LOW (ref 13–17)
HGB BLD-MCNC: 10.7 G/DL — LOW (ref 13–17)
HGB BLD-MCNC: 10.9 G/DL — LOW (ref 13–17)
HGB BLD-MCNC: 11.1 G/DL — LOW (ref 13–17)
HGB BLD-MCNC: 11.4 G/DL — LOW (ref 13–17)
HGB BLD-MCNC: 11.5 G/DL — LOW (ref 13–17)
HGB BLD-MCNC: 11.6 G/DL — LOW (ref 13–17)
HGB BLD-MCNC: 12.1 G/DL — LOW (ref 13–17)
HGB BLD-MCNC: 12.5 G/DL — LOW (ref 13–17)
HGB BLD-MCNC: 12.8 G/DL — LOW (ref 13–17)
HGB BLD-MCNC: 12.9 G/DL — LOW (ref 13–17)
HGB BLD-MCNC: 12.9 G/DL — LOW (ref 13–17)
HGB BLD-MCNC: 13 G/DL — SIGNIFICANT CHANGE UP (ref 13–17)
HGB BLD-MCNC: 13.1 G/DL — SIGNIFICANT CHANGE UP (ref 13–17)
HGB BLD-MCNC: 13.2 G/DL — SIGNIFICANT CHANGE UP (ref 13–17)
HGB BLD-MCNC: 13.3 G/DL — SIGNIFICANT CHANGE UP (ref 13–17)
HGB BLD-MCNC: 13.3 G/DL — SIGNIFICANT CHANGE UP (ref 13–17)
HGB BLD-MCNC: 13.8 G/DL — SIGNIFICANT CHANGE UP (ref 13–17)
HGB BLD-MCNC: 13.9 G/DL — SIGNIFICANT CHANGE UP (ref 13–17)
HGB BLD-MCNC: 14.5 G/DL — SIGNIFICANT CHANGE UP (ref 13–17)
HGB BLD-MCNC: 14.6 G/DL — SIGNIFICANT CHANGE UP (ref 13–17)
HGB BLD-MCNC: 6.4 G/DL — CRITICAL LOW (ref 13–17)
HGB BLD-MCNC: 6.8 G/DL — CRITICAL LOW (ref 13–17)
HGB BLD-MCNC: 6.8 G/DL — CRITICAL LOW (ref 13–17)
HGB BLD-MCNC: 7 G/DL — CRITICAL LOW (ref 13–17)
HGB BLD-MCNC: 7.1 G/DL — LOW (ref 13–17)
HGB BLD-MCNC: 7.3 G/DL — LOW (ref 13–17)
HGB BLD-MCNC: 7.5 G/DL — LOW (ref 13–17)
HGB BLD-MCNC: 7.6 G/DL — LOW (ref 13–17)
HGB BLD-MCNC: 7.7 G/DL — LOW (ref 13–17)
HGB BLD-MCNC: 7.7 G/DL — LOW (ref 13–17)
HGB BLD-MCNC: 7.8 G/DL — LOW (ref 13–17)
HGB BLD-MCNC: 7.9 G/DL — LOW (ref 13–17)
HGB BLD-MCNC: 8.1 G/DL — LOW (ref 13–17)
HGB BLD-MCNC: 8.2 G/DL — LOW (ref 13–17)
HGB BLD-MCNC: 8.4 G/DL — LOW (ref 13–17)
HGB BLD-MCNC: 8.5 G/DL — LOW (ref 13–17)
HGB BLD-MCNC: 8.6 G/DL — LOW (ref 13–17)
HGB BLD-MCNC: 8.7 G/DL — LOW (ref 13–17)
HGB BLD-MCNC: 8.8 G/DL — LOW (ref 13–17)
HGB BLD-MCNC: 8.9 G/DL — LOW (ref 13–17)
HGB BLD-MCNC: 9 G/DL — LOW (ref 13–17)
HGB BLD-MCNC: 9.2 G/DL — LOW (ref 13–17)
HGB BLD-MCNC: 9.3 G/DL — LOW (ref 13–17)
HGB BLD-MCNC: 9.4 G/DL — LOW (ref 13–17)
HGB BLD-MCNC: 9.7 G/DL — LOW (ref 13–17)
HGB BLD-MCNC: 9.7 G/DL — LOW (ref 13–17)
HGB BLD-MCNC: 9.8 G/DL — LOW (ref 13–17)
HGB BLD-MCNC: 9.9 G/DL — LOW (ref 13–17)
HOROWITZ INDEX BLDA+IHG-RTO: 21 — SIGNIFICANT CHANGE UP
HOROWITZ INDEX BLDA+IHG-RTO: 21 — SIGNIFICANT CHANGE UP
HOROWITZ INDEX BLDV+IHG-RTO: 100 — SIGNIFICANT CHANGE UP
HYALINE CASTS # UR AUTO: 10 /LPF — HIGH (ref 0–2)
HYALINE CASTS # UR AUTO: 12 /LPF — HIGH (ref 0–2)
HYALINE CASTS # UR AUTO: 2 /LPF — SIGNIFICANT CHANGE UP (ref 0–2)
IGA FLD-MCNC: 367 MG/DL — SIGNIFICANT CHANGE UP (ref 84–499)
IGG FLD-MCNC: 1306 MG/DL — SIGNIFICANT CHANGE UP (ref 610–1660)
IGM SERPL-MCNC: 29 MG/DL — LOW (ref 35–242)
IMM GRANULOCYTES NFR BLD AUTO: 0.2 % — SIGNIFICANT CHANGE UP (ref 0–1.5)
IMM GRANULOCYTES NFR BLD AUTO: 0.3 % — SIGNIFICANT CHANGE UP (ref 0–1.5)
IMM GRANULOCYTES NFR BLD AUTO: 0.5 % — SIGNIFICANT CHANGE UP (ref 0–1.5)
IMM GRANULOCYTES NFR BLD AUTO: 0.5 % — SIGNIFICANT CHANGE UP (ref 0–1.5)
IMM GRANULOCYTES NFR BLD AUTO: 0.6 % — SIGNIFICANT CHANGE UP (ref 0–1.5)
IMM GRANULOCYTES NFR BLD AUTO: 0.6 % — SIGNIFICANT CHANGE UP (ref 0–1.5)
IMM GRANULOCYTES NFR BLD AUTO: 0.9 % — SIGNIFICANT CHANGE UP (ref 0–1.5)
IMM GRANULOCYTES NFR BLD AUTO: 1.3 % — SIGNIFICANT CHANGE UP (ref 0–1.5)
IMM GRANULOCYTES NFR BLD AUTO: 1.5 % — SIGNIFICANT CHANGE UP (ref 0–1.5)
IMM GRANULOCYTES NFR BLD AUTO: 2.2 % — HIGH (ref 0–1.5)
INR BLD: 1.14 RATIO — SIGNIFICANT CHANGE UP (ref 0.88–1.16)
INR BLD: 1.2 RATIO — HIGH (ref 0.88–1.16)
INR BLD: 1.25 RATIO — HIGH (ref 0.88–1.16)
INR BLD: 1.26 RATIO — HIGH (ref 0.88–1.16)
INR BLD: 1.27 RATIO — HIGH (ref 0.88–1.16)
INR BLD: 1.34 RATIO — HIGH (ref 0.88–1.16)
INR BLD: 1.37 RATIO — HIGH (ref 0.88–1.16)
INR BLD: 1.47 RATIO — HIGH (ref 0.88–1.16)
INR BLD: 1.66 RATIO — HIGH (ref 0.88–1.16)
INR BLD: 1.7 RATIO — HIGH (ref 0.88–1.16)
INR BLD: 1.8 RATIO — HIGH (ref 0.88–1.16)
INR BLD: 1.81 RATIO — HIGH (ref 0.88–1.16)
INR BLD: 1.82 RATIO — HIGH (ref 0.88–1.16)
INR BLD: 1.86 RATIO — HIGH (ref 0.88–1.16)
INR BLD: 1.89 RATIO — HIGH (ref 0.88–1.16)
INR BLD: 1.97 RATIO — HIGH (ref 0.88–1.16)
INR BLD: 2.01 RATIO — HIGH (ref 0.88–1.16)
INR BLD: 2.2 RATIO — HIGH (ref 0.88–1.16)
INR BLD: 2.43 RATIO — HIGH (ref 0.88–1.16)
INR BLD: 2.46 RATIO — HIGH (ref 0.88–1.16)
INR BLD: 2.47 RATIO — HIGH (ref 0.88–1.16)
INTERPRETATION SERPL IFE-IMP: SIGNIFICANT CHANGE UP
IRON SATN MFR SERPL: 13 % — LOW (ref 16–55)
IRON SATN MFR SERPL: 16 UG/DL — LOW (ref 45–165)
IRON SATN MFR SERPL: 21 UG/DL — LOW (ref 45–165)
IRON SATN MFR SERPL: 31 UG/DL — LOW (ref 45–165)
IRON SATN MFR SERPL: 7 % — LOW (ref 16–55)
IRON SATN MFR SERPL: 9 % — LOW (ref 16–55)
KAPPA LC SER QL IFE: 16.92 MG/DL — HIGH (ref 0.33–1.94)
KAPPA/LAMBDA FREE LIGHT CHAIN RATIO, SERUM: 1.34 RATIO — SIGNIFICANT CHANGE UP (ref 0.26–1.65)
KETONES UR-MCNC: NEGATIVE — SIGNIFICANT CHANGE UP
LACTATE BLDV-MCNC: 0.9 MMOL/L — SIGNIFICANT CHANGE UP (ref 0.7–2)
LACTATE BLDV-MCNC: 1.6 MMOL/L — SIGNIFICANT CHANGE UP (ref 0.7–2)
LACTATE BLDV-MCNC: 2.1 MMOL/L — HIGH (ref 0.7–2)
LAMBDA LC SER QL IFE: 12.63 MG/DL — HIGH (ref 0.57–2.63)
LDH SERPL L TO P-CCNC: 150 U/L — SIGNIFICANT CHANGE UP (ref 50–242)
LEUKOCYTE ESTERASE UR-ACNC: ABNORMAL
LEUKOCYTE ESTERASE UR-ACNC: NEGATIVE — SIGNIFICANT CHANGE UP
LEUKOCYTE ESTERASE UR-ACNC: NEGATIVE — SIGNIFICANT CHANGE UP
LYMPHOCYTES # BLD AUTO: 1.17 K/UL — SIGNIFICANT CHANGE UP (ref 1–3.3)
LYMPHOCYTES # BLD AUTO: 1.29 K/UL — SIGNIFICANT CHANGE UP (ref 1–3.3)
LYMPHOCYTES # BLD AUTO: 1.41 K/UL — SIGNIFICANT CHANGE UP (ref 1–3.3)
LYMPHOCYTES # BLD AUTO: 1.53 K/UL — SIGNIFICANT CHANGE UP (ref 1–3.3)
LYMPHOCYTES # BLD AUTO: 1.6 K/UL — SIGNIFICANT CHANGE UP (ref 1–3.3)
LYMPHOCYTES # BLD AUTO: 1.66 K/UL — SIGNIFICANT CHANGE UP (ref 1–3.3)
LYMPHOCYTES # BLD AUTO: 1.73 K/UL — SIGNIFICANT CHANGE UP (ref 1–3.3)
LYMPHOCYTES # BLD AUTO: 1.73 K/UL — SIGNIFICANT CHANGE UP (ref 1–3.3)
LYMPHOCYTES # BLD AUTO: 1.89 K/UL — SIGNIFICANT CHANGE UP (ref 1–3.3)
LYMPHOCYTES # BLD AUTO: 12.1 % — LOW (ref 13–44)
LYMPHOCYTES # BLD AUTO: 14.1 % — SIGNIFICANT CHANGE UP (ref 13–44)
LYMPHOCYTES # BLD AUTO: 14.9 % — SIGNIFICANT CHANGE UP (ref 13–44)
LYMPHOCYTES # BLD AUTO: 15.2 % — SIGNIFICANT CHANGE UP (ref 13–44)
LYMPHOCYTES # BLD AUTO: 15.8 % — SIGNIFICANT CHANGE UP (ref 13–44)
LYMPHOCYTES # BLD AUTO: 16.5 % — SIGNIFICANT CHANGE UP (ref 13–44)
LYMPHOCYTES # BLD AUTO: 17.9 % — SIGNIFICANT CHANGE UP (ref 13–44)
LYMPHOCYTES # BLD AUTO: 18.7 % — SIGNIFICANT CHANGE UP (ref 13–44)
LYMPHOCYTES # BLD AUTO: 2.1 K/UL — SIGNIFICANT CHANGE UP (ref 1–3.3)
LYMPHOCYTES # BLD AUTO: 2.85 K/UL — SIGNIFICANT CHANGE UP (ref 1–3.3)
LYMPHOCYTES # BLD AUTO: 20.1 % — SIGNIFICANT CHANGE UP (ref 13–44)
LYMPHOCYTES # BLD AUTO: 22.3 % — SIGNIFICANT CHANGE UP (ref 13–44)
LYMPHOCYTES # BLD AUTO: 25.8 % — SIGNIFICANT CHANGE UP (ref 13–44)
LYMPHOCYTES # BLD AUTO: 4.49 K/UL — HIGH (ref 1–3.3)
LYMPHOCYTES # BLD AUTO: 8.2 % — LOW (ref 13–44)
MAGNESIUM SERPL-MCNC: 1.8 MG/DL — SIGNIFICANT CHANGE UP (ref 1.6–2.6)
MAGNESIUM SERPL-MCNC: 1.8 MG/DL — SIGNIFICANT CHANGE UP (ref 1.6–2.6)
MAGNESIUM SERPL-MCNC: 1.9 MG/DL — SIGNIFICANT CHANGE UP (ref 1.6–2.6)
MAGNESIUM SERPL-MCNC: 2 MG/DL — SIGNIFICANT CHANGE UP (ref 1.6–2.6)
MAGNESIUM SERPL-MCNC: 2 MG/DL — SIGNIFICANT CHANGE UP (ref 1.6–2.6)
MAGNESIUM SERPL-MCNC: 2.1 MG/DL — SIGNIFICANT CHANGE UP (ref 1.6–2.6)
MAGNESIUM SERPL-MCNC: 2.2 MG/DL — SIGNIFICANT CHANGE UP (ref 1.6–2.6)
MAGNESIUM SERPL-MCNC: 2.3 MG/DL — SIGNIFICANT CHANGE UP (ref 1.6–2.6)
MAGNESIUM SERPL-MCNC: 2.3 MG/DL — SIGNIFICANT CHANGE UP (ref 1.6–2.6)
MAGNESIUM SERPL-MCNC: 2.4 MG/DL — SIGNIFICANT CHANGE UP (ref 1.6–2.6)
MAGNESIUM SERPL-MCNC: 2.4 MG/DL — SIGNIFICANT CHANGE UP (ref 1.6–2.6)
MAGNESIUM SERPL-MCNC: 2.5 MG/DL — SIGNIFICANT CHANGE UP (ref 1.6–2.6)
MAGNESIUM SERPL-MCNC: 2.5 MG/DL — SIGNIFICANT CHANGE UP (ref 1.6–2.6)
MAGNESIUM SERPL-MCNC: 2.6 MG/DL — SIGNIFICANT CHANGE UP (ref 1.6–2.6)
MAGNESIUM SERPL-MCNC: 2.7 MG/DL — HIGH (ref 1.6–2.6)
MAGNESIUM SERPL-MCNC: 2.8 MG/DL — HIGH (ref 1.6–2.6)
MAGNESIUM SERPL-MCNC: 2.8 MG/DL — HIGH (ref 1.6–2.6)
MAGNESIUM SERPL-MCNC: 3.4 MG/DL — HIGH (ref 1.6–2.6)
MAGNESIUM SERPL-MCNC: 3.6 MG/DL — HIGH (ref 1.6–2.6)
MAGNESIUM SERPL-MCNC: 3.6 MG/DL — HIGH (ref 1.6–2.6)
MCHC RBC-ENTMCNC: 23.1 PG — LOW (ref 27–34)
MCHC RBC-ENTMCNC: 23.2 PG — LOW (ref 27–34)
MCHC RBC-ENTMCNC: 23.2 PG — LOW (ref 27–34)
MCHC RBC-ENTMCNC: 23.4 PG — LOW (ref 27–34)
MCHC RBC-ENTMCNC: 23.5 PG — LOW (ref 27–34)
MCHC RBC-ENTMCNC: 23.6 PG — LOW (ref 27–34)
MCHC RBC-ENTMCNC: 23.7 PG — LOW (ref 27–34)
MCHC RBC-ENTMCNC: 23.8 PG — LOW (ref 27–34)
MCHC RBC-ENTMCNC: 24 PG — LOW (ref 27–34)
MCHC RBC-ENTMCNC: 24.1 PG — LOW (ref 27–34)
MCHC RBC-ENTMCNC: 24.2 PG — LOW (ref 27–34)
MCHC RBC-ENTMCNC: 24.3 PG — LOW (ref 27–34)
MCHC RBC-ENTMCNC: 24.3 PG — LOW (ref 27–34)
MCHC RBC-ENTMCNC: 24.4 PG — LOW (ref 27–34)
MCHC RBC-ENTMCNC: 24.5 PG — LOW (ref 27–34)
MCHC RBC-ENTMCNC: 24.5 PG — LOW (ref 27–34)
MCHC RBC-ENTMCNC: 24.6 PG — LOW (ref 27–34)
MCHC RBC-ENTMCNC: 24.6 PG — LOW (ref 27–34)
MCHC RBC-ENTMCNC: 24.7 PG — LOW (ref 27–34)
MCHC RBC-ENTMCNC: 24.8 PG — LOW (ref 27–34)
MCHC RBC-ENTMCNC: 24.9 PG — LOW (ref 27–34)
MCHC RBC-ENTMCNC: 25 PG — LOW (ref 27–34)
MCHC RBC-ENTMCNC: 25.1 PG — LOW (ref 27–34)
MCHC RBC-ENTMCNC: 25.2 PG — LOW (ref 27–34)
MCHC RBC-ENTMCNC: 25.3 PG — LOW (ref 27–34)
MCHC RBC-ENTMCNC: 25.4 PG — LOW (ref 27–34)
MCHC RBC-ENTMCNC: 25.6 PG — LOW (ref 27–34)
MCHC RBC-ENTMCNC: 25.6 PG — LOW (ref 27–34)
MCHC RBC-ENTMCNC: 25.7 PG — LOW (ref 27–34)
MCHC RBC-ENTMCNC: 25.7 PG — LOW (ref 27–34)
MCHC RBC-ENTMCNC: 25.8 PG — LOW (ref 27–34)
MCHC RBC-ENTMCNC: 26 PG — LOW (ref 27–34)
MCHC RBC-ENTMCNC: 26.4 PG — LOW (ref 27–34)
MCHC RBC-ENTMCNC: 26.5 PG — LOW (ref 27–34)
MCHC RBC-ENTMCNC: 26.6 PG — LOW (ref 27–34)
MCHC RBC-ENTMCNC: 26.7 PG — LOW (ref 27–34)
MCHC RBC-ENTMCNC: 26.8 PG — LOW (ref 27–34)
MCHC RBC-ENTMCNC: 26.9 PG — LOW (ref 27–34)
MCHC RBC-ENTMCNC: 27 PG — SIGNIFICANT CHANGE UP (ref 27–34)
MCHC RBC-ENTMCNC: 27.4 PG — SIGNIFICANT CHANGE UP (ref 27–34)
MCHC RBC-ENTMCNC: 27.4 PG — SIGNIFICANT CHANGE UP (ref 27–34)
MCHC RBC-ENTMCNC: 27.5 PG — SIGNIFICANT CHANGE UP (ref 27–34)
MCHC RBC-ENTMCNC: 27.8 PG — SIGNIFICANT CHANGE UP (ref 27–34)
MCHC RBC-ENTMCNC: 27.9 GM/DL — LOW (ref 32–36)
MCHC RBC-ENTMCNC: 28.2 GM/DL — LOW (ref 32–36)
MCHC RBC-ENTMCNC: 28.2 PG — SIGNIFICANT CHANGE UP (ref 27–34)
MCHC RBC-ENTMCNC: 28.3 GM/DL — LOW (ref 32–36)
MCHC RBC-ENTMCNC: 28.3 GM/DL — LOW (ref 32–36)
MCHC RBC-ENTMCNC: 28.4 GM/DL — LOW (ref 32–36)
MCHC RBC-ENTMCNC: 28.5 GM/DL — LOW (ref 32–36)
MCHC RBC-ENTMCNC: 28.6 GM/DL — LOW (ref 32–36)
MCHC RBC-ENTMCNC: 28.6 GM/DL — LOW (ref 32–36)
MCHC RBC-ENTMCNC: 28.6 PG — SIGNIFICANT CHANGE UP (ref 27–34)
MCHC RBC-ENTMCNC: 28.7 GM/DL — LOW (ref 32–36)
MCHC RBC-ENTMCNC: 28.7 PG — SIGNIFICANT CHANGE UP (ref 27–34)
MCHC RBC-ENTMCNC: 28.9 GM/DL — LOW (ref 32–36)
MCHC RBC-ENTMCNC: 29 GM/DL — LOW (ref 32–36)
MCHC RBC-ENTMCNC: 29 GM/DL — LOW (ref 32–36)
MCHC RBC-ENTMCNC: 29.1 GM/DL — LOW (ref 32–36)
MCHC RBC-ENTMCNC: 29.1 GM/DL — LOW (ref 32–36)
MCHC RBC-ENTMCNC: 29.1 PG — SIGNIFICANT CHANGE UP (ref 27–34)
MCHC RBC-ENTMCNC: 29.2 GM/DL — LOW (ref 32–36)
MCHC RBC-ENTMCNC: 29.2 PG — SIGNIFICANT CHANGE UP (ref 27–34)
MCHC RBC-ENTMCNC: 29.3 GM/DL — LOW (ref 32–36)
MCHC RBC-ENTMCNC: 29.3 GM/DL — LOW (ref 32–36)
MCHC RBC-ENTMCNC: 29.4 GM/DL — LOW (ref 32–36)
MCHC RBC-ENTMCNC: 29.5 GM/DL — LOW (ref 32–36)
MCHC RBC-ENTMCNC: 29.6 GM/DL — LOW (ref 32–36)
MCHC RBC-ENTMCNC: 29.7 GM/DL — LOW (ref 32–36)
MCHC RBC-ENTMCNC: 29.8 GM/DL — LOW (ref 32–36)
MCHC RBC-ENTMCNC: 29.9 GM/DL — LOW (ref 32–36)
MCHC RBC-ENTMCNC: 30 GM/DL — LOW (ref 32–36)
MCHC RBC-ENTMCNC: 30.1 GM/DL — LOW (ref 32–36)
MCHC RBC-ENTMCNC: 30.2 GM/DL — LOW (ref 32–36)
MCHC RBC-ENTMCNC: 30.3 GM/DL — LOW (ref 32–36)
MCHC RBC-ENTMCNC: 30.3 GM/DL — LOW (ref 32–36)
MCHC RBC-ENTMCNC: 30.4 GM/DL — LOW (ref 32–36)
MCHC RBC-ENTMCNC: 30.5 GM/DL — LOW (ref 32–36)
MCHC RBC-ENTMCNC: 30.6 GM/DL — LOW (ref 32–36)
MCHC RBC-ENTMCNC: 30.7 GM/DL — LOW (ref 32–36)
MCHC RBC-ENTMCNC: 30.8 GM/DL — LOW (ref 32–36)
MCHC RBC-ENTMCNC: 30.8 GM/DL — LOW (ref 32–36)
MCHC RBC-ENTMCNC: 30.9 GM/DL — LOW (ref 32–36)
MCHC RBC-ENTMCNC: 31 GM/DL — LOW (ref 32–36)
MCHC RBC-ENTMCNC: 31.1 GM/DL — LOW (ref 32–36)
MCHC RBC-ENTMCNC: 31.1 GM/DL — LOW (ref 32–36)
MCHC RBC-ENTMCNC: 31.3 GM/DL — LOW (ref 32–36)
MCHC RBC-ENTMCNC: 31.4 GM/DL — LOW (ref 32–36)
MCHC RBC-ENTMCNC: 31.4 GM/DL — LOW (ref 32–36)
MCHC RBC-ENTMCNC: 31.5 GM/DL — LOW (ref 32–36)
MCHC RBC-ENTMCNC: 32 GM/DL — SIGNIFICANT CHANGE UP (ref 32–36)
MCHC RBC-ENTMCNC: 32.4 GM/DL — SIGNIFICANT CHANGE UP (ref 32–36)
MCHC RBC-ENTMCNC: 32.7 GM/DL — SIGNIFICANT CHANGE UP (ref 32–36)
MCHC RBC-ENTMCNC: 32.9 GM/DL — SIGNIFICANT CHANGE UP (ref 32–36)
MCHC RBC-ENTMCNC: 33.2 GM/DL — SIGNIFICANT CHANGE UP (ref 32–36)
MCV RBC AUTO: 80.1 FL — SIGNIFICANT CHANGE UP (ref 80–100)
MCV RBC AUTO: 80.5 FL — SIGNIFICANT CHANGE UP (ref 80–100)
MCV RBC AUTO: 81 FL — SIGNIFICANT CHANGE UP (ref 80–100)
MCV RBC AUTO: 81.1 FL — SIGNIFICANT CHANGE UP (ref 80–100)
MCV RBC AUTO: 81.1 FL — SIGNIFICANT CHANGE UP (ref 80–100)
MCV RBC AUTO: 81.2 FL — SIGNIFICANT CHANGE UP (ref 80–100)
MCV RBC AUTO: 81.5 FL — SIGNIFICANT CHANGE UP (ref 80–100)
MCV RBC AUTO: 81.5 FL — SIGNIFICANT CHANGE UP (ref 80–100)
MCV RBC AUTO: 81.6 FL — SIGNIFICANT CHANGE UP (ref 80–100)
MCV RBC AUTO: 81.9 FL — SIGNIFICANT CHANGE UP (ref 80–100)
MCV RBC AUTO: 81.9 FL — SIGNIFICANT CHANGE UP (ref 80–100)
MCV RBC AUTO: 82.1 FL — SIGNIFICANT CHANGE UP (ref 80–100)
MCV RBC AUTO: 82.2 FL — SIGNIFICANT CHANGE UP (ref 80–100)
MCV RBC AUTO: 82.3 FL — SIGNIFICANT CHANGE UP (ref 80–100)
MCV RBC AUTO: 82.3 FL — SIGNIFICANT CHANGE UP (ref 80–100)
MCV RBC AUTO: 82.4 FL — SIGNIFICANT CHANGE UP (ref 80–100)
MCV RBC AUTO: 82.6 FL — SIGNIFICANT CHANGE UP (ref 80–100)
MCV RBC AUTO: 82.6 FL — SIGNIFICANT CHANGE UP (ref 80–100)
MCV RBC AUTO: 82.7 FL — SIGNIFICANT CHANGE UP (ref 80–100)
MCV RBC AUTO: 82.8 FL — SIGNIFICANT CHANGE UP (ref 80–100)
MCV RBC AUTO: 82.9 FL — SIGNIFICANT CHANGE UP (ref 80–100)
MCV RBC AUTO: 83 FL — SIGNIFICANT CHANGE UP (ref 80–100)
MCV RBC AUTO: 83.1 FL — SIGNIFICANT CHANGE UP (ref 80–100)
MCV RBC AUTO: 83.2 FL — SIGNIFICANT CHANGE UP (ref 80–100)
MCV RBC AUTO: 83.2 FL — SIGNIFICANT CHANGE UP (ref 80–100)
MCV RBC AUTO: 83.3 FL — SIGNIFICANT CHANGE UP (ref 80–100)
MCV RBC AUTO: 83.4 FL — SIGNIFICANT CHANGE UP (ref 80–100)
MCV RBC AUTO: 83.4 FL — SIGNIFICANT CHANGE UP (ref 80–100)
MCV RBC AUTO: 83.5 FL — SIGNIFICANT CHANGE UP (ref 80–100)
MCV RBC AUTO: 83.6 FL — SIGNIFICANT CHANGE UP (ref 80–100)
MCV RBC AUTO: 83.6 FL — SIGNIFICANT CHANGE UP (ref 80–100)
MCV RBC AUTO: 83.8 FL — SIGNIFICANT CHANGE UP (ref 80–100)
MCV RBC AUTO: 83.9 FL — SIGNIFICANT CHANGE UP (ref 80–100)
MCV RBC AUTO: 84 FL — SIGNIFICANT CHANGE UP (ref 80–100)
MCV RBC AUTO: 84 FL — SIGNIFICANT CHANGE UP (ref 80–100)
MCV RBC AUTO: 84.1 FL — SIGNIFICANT CHANGE UP (ref 80–100)
MCV RBC AUTO: 84.1 FL — SIGNIFICANT CHANGE UP (ref 80–100)
MCV RBC AUTO: 84.3 FL — SIGNIFICANT CHANGE UP (ref 80–100)
MCV RBC AUTO: 84.3 FL — SIGNIFICANT CHANGE UP (ref 80–100)
MCV RBC AUTO: 84.4 FL — SIGNIFICANT CHANGE UP (ref 80–100)
MCV RBC AUTO: 84.5 FL — SIGNIFICANT CHANGE UP (ref 80–100)
MCV RBC AUTO: 84.6 FL — SIGNIFICANT CHANGE UP (ref 80–100)
MCV RBC AUTO: 85.2 FL — SIGNIFICANT CHANGE UP (ref 80–100)
MCV RBC AUTO: 85.3 FL — SIGNIFICANT CHANGE UP (ref 80–100)
MCV RBC AUTO: 85.8 FL — SIGNIFICANT CHANGE UP (ref 80–100)
MCV RBC AUTO: 86.4 FL — SIGNIFICANT CHANGE UP (ref 80–100)
MCV RBC AUTO: 86.5 FL — SIGNIFICANT CHANGE UP (ref 80–100)
MCV RBC AUTO: 86.8 FL — SIGNIFICANT CHANGE UP (ref 80–100)
MCV RBC AUTO: 87 FL — SIGNIFICANT CHANGE UP (ref 80–100)
MCV RBC AUTO: 87.1 FL — SIGNIFICANT CHANGE UP (ref 80–100)
MCV RBC AUTO: 87.1 FL — SIGNIFICANT CHANGE UP (ref 80–100)
MCV RBC AUTO: 87.5 FL — SIGNIFICANT CHANGE UP (ref 80–100)
MCV RBC AUTO: 87.5 FL — SIGNIFICANT CHANGE UP (ref 80–100)
MCV RBC AUTO: 88 FL — SIGNIFICANT CHANGE UP (ref 80–100)
MCV RBC AUTO: 88 FL — SIGNIFICANT CHANGE UP (ref 80–100)
MCV RBC AUTO: 88.1 FL — SIGNIFICANT CHANGE UP (ref 80–100)
MCV RBC AUTO: 88.5 FL — SIGNIFICANT CHANGE UP (ref 80–100)
MCV RBC AUTO: 88.6 FL — SIGNIFICANT CHANGE UP (ref 80–100)
MCV RBC AUTO: 88.6 FL — SIGNIFICANT CHANGE UP (ref 80–100)
MCV RBC AUTO: 88.9 FL — SIGNIFICANT CHANGE UP (ref 80–100)
MCV RBC AUTO: 90.5 FL — SIGNIFICANT CHANGE UP (ref 80–100)
METHOD TYPE: SIGNIFICANT CHANGE UP
MONOCYTES # BLD AUTO: 0.71 K/UL — SIGNIFICANT CHANGE UP (ref 0–0.9)
MONOCYTES # BLD AUTO: 0.78 K/UL — SIGNIFICANT CHANGE UP (ref 0–0.9)
MONOCYTES # BLD AUTO: 0.81 K/UL — SIGNIFICANT CHANGE UP (ref 0–0.9)
MONOCYTES # BLD AUTO: 0.83 K/UL — SIGNIFICANT CHANGE UP (ref 0–0.9)
MONOCYTES # BLD AUTO: 0.99 K/UL — HIGH (ref 0–0.9)
MONOCYTES # BLD AUTO: 1.04 K/UL — HIGH (ref 0–0.9)
MONOCYTES # BLD AUTO: 1.05 K/UL — HIGH (ref 0–0.9)
MONOCYTES # BLD AUTO: 1.11 K/UL — HIGH (ref 0–0.9)
MONOCYTES # BLD AUTO: 1.24 K/UL — HIGH (ref 0–0.9)
MONOCYTES # BLD AUTO: 1.54 K/UL — HIGH (ref 0–0.9)
MONOCYTES # BLD AUTO: 1.54 K/UL — HIGH (ref 0–0.9)
MONOCYTES # BLD AUTO: 2.49 K/UL — HIGH (ref 0–0.9)
MONOCYTES NFR BLD AUTO: 10.3 % — SIGNIFICANT CHANGE UP (ref 2–14)
MONOCYTES NFR BLD AUTO: 12.3 % — SIGNIFICANT CHANGE UP (ref 2–14)
MONOCYTES NFR BLD AUTO: 12.3 % — SIGNIFICANT CHANGE UP (ref 2–14)
MONOCYTES NFR BLD AUTO: 12.9 % — SIGNIFICANT CHANGE UP (ref 2–14)
MONOCYTES NFR BLD AUTO: 13.1 % — SIGNIFICANT CHANGE UP (ref 2–14)
MONOCYTES NFR BLD AUTO: 7.7 % — SIGNIFICANT CHANGE UP (ref 2–14)
MONOCYTES NFR BLD AUTO: 7.8 % — SIGNIFICANT CHANGE UP (ref 2–14)
MONOCYTES NFR BLD AUTO: 8.5 % — SIGNIFICANT CHANGE UP (ref 2–14)
MONOCYTES NFR BLD AUTO: 8.6 % — SIGNIFICANT CHANGE UP (ref 2–14)
MONOCYTES NFR BLD AUTO: 8.8 % — SIGNIFICANT CHANGE UP (ref 2–14)
MONOCYTES NFR BLD AUTO: 8.9 % — SIGNIFICANT CHANGE UP (ref 2–14)
MONOCYTES NFR BLD AUTO: 9.1 % — SIGNIFICANT CHANGE UP (ref 2–14)
NEUTROPHILS # BLD AUTO: 10.84 K/UL — HIGH (ref 1.8–7.4)
NEUTROPHILS # BLD AUTO: 11 K/UL — HIGH (ref 1.8–7.4)
NEUTROPHILS # BLD AUTO: 13.15 K/UL — HIGH (ref 1.8–7.4)
NEUTROPHILS # BLD AUTO: 15.5 K/UL — HIGH (ref 1.8–7.4)
NEUTROPHILS # BLD AUTO: 5.1 K/UL — SIGNIFICANT CHANGE UP (ref 1.8–7.4)
NEUTROPHILS # BLD AUTO: 5.36 K/UL — SIGNIFICANT CHANGE UP (ref 1.8–7.4)
NEUTROPHILS # BLD AUTO: 5.61 K/UL — SIGNIFICANT CHANGE UP (ref 1.8–7.4)
NEUTROPHILS # BLD AUTO: 5.61 K/UL — SIGNIFICANT CHANGE UP (ref 1.8–7.4)
NEUTROPHILS # BLD AUTO: 6.15 K/UL — SIGNIFICANT CHANGE UP (ref 1.8–7.4)
NEUTROPHILS # BLD AUTO: 6.65 K/UL — SIGNIFICANT CHANGE UP (ref 1.8–7.4)
NEUTROPHILS # BLD AUTO: 7.22 K/UL — SIGNIFICANT CHANGE UP (ref 1.8–7.4)
NEUTROPHILS # BLD AUTO: 9.32 K/UL — HIGH (ref 1.8–7.4)
NEUTROPHILS NFR BLD AUTO: 62.4 % — SIGNIFICANT CHANGE UP (ref 43–77)
NEUTROPHILS NFR BLD AUTO: 65.4 % — SIGNIFICANT CHANGE UP (ref 43–77)
NEUTROPHILS NFR BLD AUTO: 65.6 % — SIGNIFICANT CHANGE UP (ref 43–77)
NEUTROPHILS NFR BLD AUTO: 67.2 % — SIGNIFICANT CHANGE UP (ref 43–77)
NEUTROPHILS NFR BLD AUTO: 68.6 % — SIGNIFICANT CHANGE UP (ref 43–77)
NEUTROPHILS NFR BLD AUTO: 69.6 % — SIGNIFICANT CHANGE UP (ref 43–77)
NEUTROPHILS NFR BLD AUTO: 70.2 % — SIGNIFICANT CHANGE UP (ref 43–77)
NEUTROPHILS NFR BLD AUTO: 71.5 % — SIGNIFICANT CHANGE UP (ref 43–77)
NEUTROPHILS NFR BLD AUTO: 73.1 % — SIGNIFICANT CHANGE UP (ref 43–77)
NEUTROPHILS NFR BLD AUTO: 75.7 % — SIGNIFICANT CHANGE UP (ref 43–77)
NEUTROPHILS NFR BLD AUTO: 76.5 % — SIGNIFICANT CHANGE UP (ref 43–77)
NEUTROPHILS NFR BLD AUTO: 77.2 % — HIGH (ref 43–77)
NITRITE UR-MCNC: NEGATIVE — SIGNIFICANT CHANGE UP
NRBC # BLD: 0 /100 WBCS — SIGNIFICANT CHANGE UP (ref 0–0)
NT-PROBNP SERPL-SCNC: 9179 PG/ML — HIGH (ref 0–300)
NT-PROBNP SERPL-SCNC: HIGH PG/ML (ref 0–300)
OB PNL STL: POSITIVE
ORGANISM # SPEC MICROSCOPIC CNT: SIGNIFICANT CHANGE UP
ORGANISM # SPEC MICROSCOPIC CNT: SIGNIFICANT CHANGE UP
P-ANCA SER-ACNC: NEGATIVE — SIGNIFICANT CHANGE UP
PCO2 BLDA: 37 MMHG — SIGNIFICANT CHANGE UP (ref 35–48)
PCO2 BLDA: 38 MMHG — SIGNIFICANT CHANGE UP (ref 35–48)
PCO2 BLDV: 45 MMHG — SIGNIFICANT CHANGE UP (ref 42–55)
PCO2 BLDV: 56 MMHG — HIGH (ref 42–55)
PCO2 BLDV: 58 MMHG — HIGH (ref 42–55)
PCO2 BLDV: 63 MMHG — HIGH (ref 42–55)
PH BLDA: 7.42 — SIGNIFICANT CHANGE UP (ref 7.35–7.45)
PH BLDA: 7.43 — SIGNIFICANT CHANGE UP (ref 7.35–7.45)
PH BLDV: 7.27 — LOW (ref 7.32–7.43)
PH BLDV: 7.4 — SIGNIFICANT CHANGE UP (ref 7.32–7.43)
PH BLDV: 7.4 — SIGNIFICANT CHANGE UP (ref 7.32–7.43)
PH BLDV: 7.42 — SIGNIFICANT CHANGE UP (ref 7.32–7.43)
PH UR: 5.5 — SIGNIFICANT CHANGE UP (ref 5–8)
PH UR: 6 — SIGNIFICANT CHANGE UP (ref 5–8)
PH UR: 7 — SIGNIFICANT CHANGE UP (ref 5–8)
PHOSPHATE SERPL-MCNC: 1.3 MG/DL — LOW (ref 2.5–4.5)
PHOSPHATE SERPL-MCNC: 1.6 MG/DL — LOW (ref 2.5–4.5)
PHOSPHATE SERPL-MCNC: 1.7 MG/DL — LOW (ref 2.5–4.5)
PHOSPHATE SERPL-MCNC: 11.6 MG/DL — HIGH (ref 2.5–4.5)
PHOSPHATE SERPL-MCNC: 11.9 MG/DL — HIGH (ref 2.5–4.5)
PHOSPHATE SERPL-MCNC: 12.4 MG/DL — HIGH (ref 2.5–4.5)
PHOSPHATE SERPL-MCNC: 2.4 MG/DL — LOW (ref 2.5–4.5)
PHOSPHATE SERPL-MCNC: 2.7 MG/DL — SIGNIFICANT CHANGE UP (ref 2.5–4.5)
PHOSPHATE SERPL-MCNC: 3 MG/DL — SIGNIFICANT CHANGE UP (ref 2.5–4.5)
PHOSPHATE SERPL-MCNC: 3.1 MG/DL — SIGNIFICANT CHANGE UP (ref 2.5–4.5)
PHOSPHATE SERPL-MCNC: 3.1 MG/DL — SIGNIFICANT CHANGE UP (ref 2.5–4.5)
PHOSPHATE SERPL-MCNC: 3.2 MG/DL — SIGNIFICANT CHANGE UP (ref 2.5–4.5)
PHOSPHATE SERPL-MCNC: 3.4 MG/DL — SIGNIFICANT CHANGE UP (ref 2.5–4.5)
PHOSPHATE SERPL-MCNC: 3.7 MG/DL — SIGNIFICANT CHANGE UP (ref 2.5–4.5)
PHOSPHATE SERPL-MCNC: 3.8 MG/DL — SIGNIFICANT CHANGE UP (ref 2.5–4.5)
PHOSPHATE SERPL-MCNC: 3.9 MG/DL — SIGNIFICANT CHANGE UP (ref 2.5–4.5)
PHOSPHATE SERPL-MCNC: 4 MG/DL — SIGNIFICANT CHANGE UP (ref 2.5–4.5)
PHOSPHATE SERPL-MCNC: 4.1 MG/DL — SIGNIFICANT CHANGE UP (ref 2.5–4.5)
PHOSPHATE SERPL-MCNC: 4.2 MG/DL — SIGNIFICANT CHANGE UP (ref 2.5–4.5)
PHOSPHATE SERPL-MCNC: 4.3 MG/DL — SIGNIFICANT CHANGE UP (ref 2.5–4.5)
PHOSPHATE SERPL-MCNC: 4.4 MG/DL — SIGNIFICANT CHANGE UP (ref 2.5–4.5)
PHOSPHATE SERPL-MCNC: 4.5 MG/DL — SIGNIFICANT CHANGE UP (ref 2.5–4.5)
PHOSPHATE SERPL-MCNC: 5 MG/DL — HIGH (ref 2.5–4.5)
PHOSPHATE SERPL-MCNC: 5 MG/DL — HIGH (ref 2.5–4.5)
PHOSPHATE SERPL-MCNC: 5.1 MG/DL — HIGH (ref 2.5–4.5)
PHOSPHATE SERPL-MCNC: 5.2 MG/DL — HIGH (ref 2.5–4.5)
PHOSPHATE SERPL-MCNC: 5.3 MG/DL — HIGH (ref 2.5–4.5)
PHOSPHATE SERPL-MCNC: 5.4 MG/DL — HIGH (ref 2.5–4.5)
PHOSPHATE SERPL-MCNC: 6 MG/DL — HIGH (ref 2.5–4.5)
PHOSPHATE SERPL-MCNC: 6.1 MG/DL — HIGH (ref 2.5–4.5)
PHOSPHATE SERPL-MCNC: 6.7 MG/DL — HIGH (ref 2.5–4.5)
PHOSPHATE SERPL-MCNC: 6.7 MG/DL — HIGH (ref 2.5–4.5)
PHOSPHATE SERPL-MCNC: 8.7 MG/DL — HIGH (ref 2.5–4.5)
PHOSPHATE SERPL-MCNC: 8.8 MG/DL — HIGH (ref 2.5–4.5)
PLATELET # BLD AUTO: 171 K/UL — SIGNIFICANT CHANGE UP (ref 150–400)
PLATELET # BLD AUTO: 173 K/UL — SIGNIFICANT CHANGE UP (ref 150–400)
PLATELET # BLD AUTO: 175 K/UL — SIGNIFICANT CHANGE UP (ref 150–400)
PLATELET # BLD AUTO: 181 K/UL — SIGNIFICANT CHANGE UP (ref 150–400)
PLATELET # BLD AUTO: 182 K/UL — SIGNIFICANT CHANGE UP (ref 150–400)
PLATELET # BLD AUTO: 182 K/UL — SIGNIFICANT CHANGE UP (ref 150–400)
PLATELET # BLD AUTO: 186 K/UL — SIGNIFICANT CHANGE UP (ref 150–400)
PLATELET # BLD AUTO: 187 K/UL — SIGNIFICANT CHANGE UP (ref 150–400)
PLATELET # BLD AUTO: 189 K/UL — SIGNIFICANT CHANGE UP (ref 150–400)
PLATELET # BLD AUTO: 190 K/UL — SIGNIFICANT CHANGE UP (ref 150–400)
PLATELET # BLD AUTO: 201 K/UL — SIGNIFICANT CHANGE UP (ref 150–400)
PLATELET # BLD AUTO: 202 K/UL — SIGNIFICANT CHANGE UP (ref 150–400)
PLATELET # BLD AUTO: 205 K/UL — SIGNIFICANT CHANGE UP (ref 150–400)
PLATELET # BLD AUTO: 210 K/UL — SIGNIFICANT CHANGE UP (ref 150–400)
PLATELET # BLD AUTO: 211 K/UL — SIGNIFICANT CHANGE UP (ref 150–400)
PLATELET # BLD AUTO: 211 K/UL — SIGNIFICANT CHANGE UP (ref 150–400)
PLATELET # BLD AUTO: 214 K/UL — SIGNIFICANT CHANGE UP (ref 150–400)
PLATELET # BLD AUTO: 214 K/UL — SIGNIFICANT CHANGE UP (ref 150–400)
PLATELET # BLD AUTO: 217 K/UL — SIGNIFICANT CHANGE UP (ref 150–400)
PLATELET # BLD AUTO: 217 K/UL — SIGNIFICANT CHANGE UP (ref 150–400)
PLATELET # BLD AUTO: 218 K/UL — SIGNIFICANT CHANGE UP (ref 150–400)
PLATELET # BLD AUTO: 219 K/UL — SIGNIFICANT CHANGE UP (ref 150–400)
PLATELET # BLD AUTO: 219 K/UL — SIGNIFICANT CHANGE UP (ref 150–400)
PLATELET # BLD AUTO: 222 K/UL — SIGNIFICANT CHANGE UP (ref 150–400)
PLATELET # BLD AUTO: 225 K/UL — SIGNIFICANT CHANGE UP (ref 150–400)
PLATELET # BLD AUTO: 226 K/UL — SIGNIFICANT CHANGE UP (ref 150–400)
PLATELET # BLD AUTO: 227 K/UL — SIGNIFICANT CHANGE UP (ref 150–400)
PLATELET # BLD AUTO: 228 K/UL — SIGNIFICANT CHANGE UP (ref 150–400)
PLATELET # BLD AUTO: 230 K/UL — SIGNIFICANT CHANGE UP (ref 150–400)
PLATELET # BLD AUTO: 230 K/UL — SIGNIFICANT CHANGE UP (ref 150–400)
PLATELET # BLD AUTO: 234 K/UL — SIGNIFICANT CHANGE UP (ref 150–400)
PLATELET # BLD AUTO: 234 K/UL — SIGNIFICANT CHANGE UP (ref 150–400)
PLATELET # BLD AUTO: 237 K/UL — SIGNIFICANT CHANGE UP (ref 150–400)
PLATELET # BLD AUTO: 245 K/UL — SIGNIFICANT CHANGE UP (ref 150–400)
PLATELET # BLD AUTO: 246 K/UL — SIGNIFICANT CHANGE UP (ref 150–400)
PLATELET # BLD AUTO: 247 K/UL — SIGNIFICANT CHANGE UP (ref 150–400)
PLATELET # BLD AUTO: 248 K/UL — SIGNIFICANT CHANGE UP (ref 150–400)
PLATELET # BLD AUTO: 253 K/UL — SIGNIFICANT CHANGE UP (ref 150–400)
PLATELET # BLD AUTO: 253 K/UL — SIGNIFICANT CHANGE UP (ref 150–400)
PLATELET # BLD AUTO: 255 K/UL — SIGNIFICANT CHANGE UP (ref 150–400)
PLATELET # BLD AUTO: 256 K/UL — SIGNIFICANT CHANGE UP (ref 150–400)
PLATELET # BLD AUTO: 257 K/UL — SIGNIFICANT CHANGE UP (ref 150–400)
PLATELET # BLD AUTO: 259 K/UL — SIGNIFICANT CHANGE UP (ref 150–400)
PLATELET # BLD AUTO: 261 K/UL — SIGNIFICANT CHANGE UP (ref 150–400)
PLATELET # BLD AUTO: 261 K/UL — SIGNIFICANT CHANGE UP (ref 150–400)
PLATELET # BLD AUTO: 264 K/UL — SIGNIFICANT CHANGE UP (ref 150–400)
PLATELET # BLD AUTO: 265 K/UL — SIGNIFICANT CHANGE UP (ref 150–400)
PLATELET # BLD AUTO: 266 K/UL — SIGNIFICANT CHANGE UP (ref 150–400)
PLATELET # BLD AUTO: 266 K/UL — SIGNIFICANT CHANGE UP (ref 150–400)
PLATELET # BLD AUTO: 268 K/UL — SIGNIFICANT CHANGE UP (ref 150–400)
PLATELET # BLD AUTO: 268 K/UL — SIGNIFICANT CHANGE UP (ref 150–400)
PLATELET # BLD AUTO: 269 K/UL — SIGNIFICANT CHANGE UP (ref 150–400)
PLATELET # BLD AUTO: 272 K/UL — SIGNIFICANT CHANGE UP (ref 150–400)
PLATELET # BLD AUTO: 273 K/UL — SIGNIFICANT CHANGE UP (ref 150–400)
PLATELET # BLD AUTO: 274 K/UL — SIGNIFICANT CHANGE UP (ref 150–400)
PLATELET # BLD AUTO: 275 K/UL — SIGNIFICANT CHANGE UP (ref 150–400)
PLATELET # BLD AUTO: 275 K/UL — SIGNIFICANT CHANGE UP (ref 150–400)
PLATELET # BLD AUTO: 276 K/UL — SIGNIFICANT CHANGE UP (ref 150–400)
PLATELET # BLD AUTO: 278 K/UL — SIGNIFICANT CHANGE UP (ref 150–400)
PLATELET # BLD AUTO: 279 K/UL — SIGNIFICANT CHANGE UP (ref 150–400)
PLATELET # BLD AUTO: 283 K/UL — SIGNIFICANT CHANGE UP (ref 150–400)
PLATELET # BLD AUTO: 283 K/UL — SIGNIFICANT CHANGE UP (ref 150–400)
PLATELET # BLD AUTO: 288 K/UL — SIGNIFICANT CHANGE UP (ref 150–400)
PLATELET # BLD AUTO: 288 K/UL — SIGNIFICANT CHANGE UP (ref 150–400)
PLATELET # BLD AUTO: 290 K/UL — SIGNIFICANT CHANGE UP (ref 150–400)
PLATELET # BLD AUTO: 292 K/UL — SIGNIFICANT CHANGE UP (ref 150–400)
PLATELET # BLD AUTO: 295 K/UL — SIGNIFICANT CHANGE UP (ref 150–400)
PLATELET # BLD AUTO: 303 K/UL — SIGNIFICANT CHANGE UP (ref 150–400)
PLATELET # BLD AUTO: 304 K/UL — SIGNIFICANT CHANGE UP (ref 150–400)
PLATELET # BLD AUTO: 307 K/UL — SIGNIFICANT CHANGE UP (ref 150–400)
PLATELET # BLD AUTO: 308 K/UL — SIGNIFICANT CHANGE UP (ref 150–400)
PLATELET # BLD AUTO: 315 K/UL — SIGNIFICANT CHANGE UP (ref 150–400)
PLATELET # BLD AUTO: 321 K/UL — SIGNIFICANT CHANGE UP (ref 150–400)
PLATELET # BLD AUTO: 323 K/UL — SIGNIFICANT CHANGE UP (ref 150–400)
PLATELET # BLD AUTO: 325 K/UL — SIGNIFICANT CHANGE UP (ref 150–400)
PLATELET # BLD AUTO: 327 K/UL — SIGNIFICANT CHANGE UP (ref 150–400)
PLATELET # BLD AUTO: 328 K/UL — SIGNIFICANT CHANGE UP (ref 150–400)
PLATELET # BLD AUTO: 330 K/UL — SIGNIFICANT CHANGE UP (ref 150–400)
PLATELET # BLD AUTO: 337 K/UL — SIGNIFICANT CHANGE UP (ref 150–400)
PLATELET # BLD AUTO: 338 K/UL — SIGNIFICANT CHANGE UP (ref 150–400)
PLATELET # BLD AUTO: 340 K/UL — SIGNIFICANT CHANGE UP (ref 150–400)
PLATELET # BLD AUTO: 346 K/UL — SIGNIFICANT CHANGE UP (ref 150–400)
PLATELET # BLD AUTO: 351 K/UL — SIGNIFICANT CHANGE UP (ref 150–400)
PLATELET # BLD AUTO: 353 K/UL — SIGNIFICANT CHANGE UP (ref 150–400)
PLATELET # BLD AUTO: 356 K/UL — SIGNIFICANT CHANGE UP (ref 150–400)
PLATELET # BLD AUTO: 373 K/UL — SIGNIFICANT CHANGE UP (ref 150–400)
PLATELET # BLD AUTO: 396 K/UL — SIGNIFICANT CHANGE UP (ref 150–400)
PLATELET # BLD AUTO: 407 K/UL — HIGH (ref 150–400)
PLATELET # BLD AUTO: 412 K/UL — HIGH (ref 150–400)
PLATELET # BLD AUTO: 440 K/UL — HIGH (ref 150–400)
PO2 BLDA: 77 MMHG — LOW (ref 83–108)
PO2 BLDA: 79 MMHG — LOW (ref 83–108)
PO2 BLDV: 24 MMHG — LOW (ref 25–45)
PO2 BLDV: 27 MMHG — SIGNIFICANT CHANGE UP (ref 25–45)
PO2 BLDV: 33 MMHG — SIGNIFICANT CHANGE UP (ref 25–45)
PO2 BLDV: 33 MMHG — SIGNIFICANT CHANGE UP (ref 25–45)
POTASSIUM BLDV-SCNC: 3.6 MMOL/L — SIGNIFICANT CHANGE UP (ref 3.5–5.1)
POTASSIUM BLDV-SCNC: 3.7 MMOL/L — SIGNIFICANT CHANGE UP (ref 3.5–5.1)
POTASSIUM BLDV-SCNC: 4.7 MMOL/L — SIGNIFICANT CHANGE UP (ref 3.5–5.1)
POTASSIUM SERPL-MCNC: 3.5 MMOL/L — SIGNIFICANT CHANGE UP (ref 3.5–5.3)
POTASSIUM SERPL-MCNC: 3.6 MMOL/L — SIGNIFICANT CHANGE UP (ref 3.5–5.3)
POTASSIUM SERPL-MCNC: 3.6 MMOL/L — SIGNIFICANT CHANGE UP (ref 3.5–5.3)
POTASSIUM SERPL-MCNC: 3.7 MMOL/L — SIGNIFICANT CHANGE UP (ref 3.5–5.3)
POTASSIUM SERPL-MCNC: 3.7 MMOL/L — SIGNIFICANT CHANGE UP (ref 3.5–5.3)
POTASSIUM SERPL-MCNC: 3.8 MMOL/L — SIGNIFICANT CHANGE UP (ref 3.5–5.3)
POTASSIUM SERPL-MCNC: 3.8 MMOL/L — SIGNIFICANT CHANGE UP (ref 3.5–5.3)
POTASSIUM SERPL-MCNC: 3.9 MMOL/L — SIGNIFICANT CHANGE UP (ref 3.5–5.3)
POTASSIUM SERPL-MCNC: 4 MMOL/L — SIGNIFICANT CHANGE UP (ref 3.5–5.3)
POTASSIUM SERPL-MCNC: 4.1 MMOL/L — SIGNIFICANT CHANGE UP (ref 3.5–5.3)
POTASSIUM SERPL-MCNC: 4.2 MMOL/L — SIGNIFICANT CHANGE UP (ref 3.5–5.3)
POTASSIUM SERPL-MCNC: 4.3 MMOL/L — SIGNIFICANT CHANGE UP (ref 3.5–5.3)
POTASSIUM SERPL-MCNC: 4.4 MMOL/L — SIGNIFICANT CHANGE UP (ref 3.5–5.3)
POTASSIUM SERPL-MCNC: 4.5 MMOL/L — SIGNIFICANT CHANGE UP (ref 3.5–5.3)
POTASSIUM SERPL-MCNC: 4.6 MMOL/L — SIGNIFICANT CHANGE UP (ref 3.5–5.3)
POTASSIUM SERPL-MCNC: 4.7 MMOL/L — SIGNIFICANT CHANGE UP (ref 3.5–5.3)
POTASSIUM SERPL-MCNC: 4.8 MMOL/L — SIGNIFICANT CHANGE UP (ref 3.5–5.3)
POTASSIUM SERPL-MCNC: 4.9 MMOL/L — SIGNIFICANT CHANGE UP (ref 3.5–5.3)
POTASSIUM SERPL-MCNC: 5 MMOL/L — SIGNIFICANT CHANGE UP (ref 3.5–5.3)
POTASSIUM SERPL-MCNC: 5.1 MMOL/L — SIGNIFICANT CHANGE UP (ref 3.5–5.3)
POTASSIUM SERPL-MCNC: 5.3 MMOL/L — SIGNIFICANT CHANGE UP (ref 3.5–5.3)
POTASSIUM SERPL-MCNC: 5.3 MMOL/L — SIGNIFICANT CHANGE UP (ref 3.5–5.3)
POTASSIUM SERPL-MCNC: 5.5 MMOL/L — HIGH (ref 3.5–5.3)
POTASSIUM SERPL-MCNC: 5.6 MMOL/L — HIGH (ref 3.5–5.3)
POTASSIUM SERPL-MCNC: 5.6 MMOL/L — HIGH (ref 3.5–5.3)
POTASSIUM SERPL-MCNC: 5.9 MMOL/L — HIGH (ref 3.5–5.3)
POTASSIUM SERPL-SCNC: 3.5 MMOL/L — SIGNIFICANT CHANGE UP (ref 3.5–5.3)
POTASSIUM SERPL-SCNC: 3.6 MMOL/L — SIGNIFICANT CHANGE UP (ref 3.5–5.3)
POTASSIUM SERPL-SCNC: 3.6 MMOL/L — SIGNIFICANT CHANGE UP (ref 3.5–5.3)
POTASSIUM SERPL-SCNC: 3.7 MMOL/L — SIGNIFICANT CHANGE UP (ref 3.5–5.3)
POTASSIUM SERPL-SCNC: 3.7 MMOL/L — SIGNIFICANT CHANGE UP (ref 3.5–5.3)
POTASSIUM SERPL-SCNC: 3.8 MMOL/L — SIGNIFICANT CHANGE UP (ref 3.5–5.3)
POTASSIUM SERPL-SCNC: 3.8 MMOL/L — SIGNIFICANT CHANGE UP (ref 3.5–5.3)
POTASSIUM SERPL-SCNC: 3.9 MMOL/L — SIGNIFICANT CHANGE UP (ref 3.5–5.3)
POTASSIUM SERPL-SCNC: 4 MMOL/L — SIGNIFICANT CHANGE UP (ref 3.5–5.3)
POTASSIUM SERPL-SCNC: 4.1 MMOL/L — SIGNIFICANT CHANGE UP (ref 3.5–5.3)
POTASSIUM SERPL-SCNC: 4.2 MMOL/L — SIGNIFICANT CHANGE UP (ref 3.5–5.3)
POTASSIUM SERPL-SCNC: 4.3 MMOL/L — SIGNIFICANT CHANGE UP (ref 3.5–5.3)
POTASSIUM SERPL-SCNC: 4.4 MMOL/L — SIGNIFICANT CHANGE UP (ref 3.5–5.3)
POTASSIUM SERPL-SCNC: 4.5 MMOL/L — SIGNIFICANT CHANGE UP (ref 3.5–5.3)
POTASSIUM SERPL-SCNC: 4.6 MMOL/L — SIGNIFICANT CHANGE UP (ref 3.5–5.3)
POTASSIUM SERPL-SCNC: 4.7 MMOL/L — SIGNIFICANT CHANGE UP (ref 3.5–5.3)
POTASSIUM SERPL-SCNC: 4.8 MMOL/L — SIGNIFICANT CHANGE UP (ref 3.5–5.3)
POTASSIUM SERPL-SCNC: 4.9 MMOL/L — SIGNIFICANT CHANGE UP (ref 3.5–5.3)
POTASSIUM SERPL-SCNC: 5 MMOL/L — SIGNIFICANT CHANGE UP (ref 3.5–5.3)
POTASSIUM SERPL-SCNC: 5.1 MMOL/L — SIGNIFICANT CHANGE UP (ref 3.5–5.3)
POTASSIUM SERPL-SCNC: 5.3 MMOL/L — SIGNIFICANT CHANGE UP (ref 3.5–5.3)
POTASSIUM SERPL-SCNC: 5.3 MMOL/L — SIGNIFICANT CHANGE UP (ref 3.5–5.3)
POTASSIUM SERPL-SCNC: 5.5 MMOL/L — HIGH (ref 3.5–5.3)
POTASSIUM SERPL-SCNC: 5.6 MMOL/L — HIGH (ref 3.5–5.3)
POTASSIUM SERPL-SCNC: 5.6 MMOL/L — HIGH (ref 3.5–5.3)
POTASSIUM SERPL-SCNC: 5.9 MMOL/L — HIGH (ref 3.5–5.3)
PROCALCITONIN SERPL-MCNC: 0.61 NG/ML — HIGH (ref 0.02–0.1)
PROCALCITONIN SERPL-MCNC: 0.78 NG/ML — HIGH (ref 0.02–0.1)
PROCALCITONIN SERPL-MCNC: 0.8 NG/ML — HIGH (ref 0.02–0.1)
PROT ?TM UR-MCNC: 61 MG/DL — HIGH (ref 0–12)
PROT ?TM UR-MCNC: 71 MG/DL — HIGH (ref 0–12)
PROT PATTERN SERPL ELPH-IMP: SIGNIFICANT CHANGE UP
PROT SERPL-MCNC: 5.4 G/DL — LOW (ref 6–8.3)
PROT SERPL-MCNC: 5.4 G/DL — LOW (ref 6–8.3)
PROT SERPL-MCNC: 6 G/DL — SIGNIFICANT CHANGE UP (ref 6–8.3)
PROT SERPL-MCNC: 6.1 G/DL — SIGNIFICANT CHANGE UP (ref 6–8.3)
PROT SERPL-MCNC: 6.4 G/DL — SIGNIFICANT CHANGE UP (ref 6–8.3)
PROT SERPL-MCNC: 6.6 G/DL — SIGNIFICANT CHANGE UP (ref 6–8.3)
PROT SERPL-MCNC: 6.9 G/DL — SIGNIFICANT CHANGE UP (ref 6–8.3)
PROT SERPL-MCNC: 6.9 G/DL — SIGNIFICANT CHANGE UP (ref 6–8.3)
PROT SERPL-MCNC: 7.1 G/DL — SIGNIFICANT CHANGE UP (ref 6–8.3)
PROT SERPL-MCNC: 7.3 G/DL — SIGNIFICANT CHANGE UP (ref 6–8.3)
PROT SERPL-MCNC: 7.3 G/DL — SIGNIFICANT CHANGE UP (ref 6–8.3)
PROT SERPL-MCNC: 7.6 G/DL — SIGNIFICANT CHANGE UP (ref 6–8.3)
PROT SERPL-MCNC: 7.9 G/DL — SIGNIFICANT CHANGE UP (ref 6–8.3)
PROT SERPL-MCNC: 7.9 G/DL — SIGNIFICANT CHANGE UP (ref 6–8.3)
PROT SERPL-MCNC: 8.5 G/DL — HIGH (ref 6–8.3)
PROT UR-MCNC: ABNORMAL
PROT/CREAT UR-RTO: 0.5 RATIO — HIGH (ref 0–0.2)
PROT/CREAT UR-RTO: 0.8 RATIO — HIGH (ref 0–0.2)
PROTHROM AB SERPL-ACNC: 13.2 SEC — SIGNIFICANT CHANGE UP (ref 10.5–13.4)
PROTHROM AB SERPL-ACNC: 13.8 SEC — HIGH (ref 10.5–13.4)
PROTHROM AB SERPL-ACNC: 14.4 SEC — HIGH (ref 10.5–13.4)
PROTHROM AB SERPL-ACNC: 14.6 SEC — HIGH (ref 10.5–13.4)
PROTHROM AB SERPL-ACNC: 14.7 SEC — HIGH (ref 10.5–13.4)
PROTHROM AB SERPL-ACNC: 15.6 SEC — HIGH (ref 10.5–13.4)
PROTHROM AB SERPL-ACNC: 15.9 SEC — HIGH (ref 10.5–13.4)
PROTHROM AB SERPL-ACNC: 17.3 SEC — HIGH (ref 10.6–13.6)
PROTHROM AB SERPL-ACNC: 19.3 SEC — HIGH (ref 10.5–13.4)
PROTHROM AB SERPL-ACNC: 19.7 SEC — HIGH (ref 10.5–13.4)
PROTHROM AB SERPL-ACNC: 21 SEC — HIGH (ref 10.5–13.4)
PROTHROM AB SERPL-ACNC: 21 SEC — HIGH (ref 10.5–13.4)
PROTHROM AB SERPL-ACNC: 21.1 SEC — HIGH (ref 10.5–13.4)
PROTHROM AB SERPL-ACNC: 21.7 SEC — HIGH (ref 10.5–13.4)
PROTHROM AB SERPL-ACNC: 21.9 SEC — HIGH (ref 10.5–13.4)
PROTHROM AB SERPL-ACNC: 23 SEC — HIGH (ref 10.5–13.4)
PROTHROM AB SERPL-ACNC: 23.5 SEC — HIGH (ref 10.5–13.4)
PROTHROM AB SERPL-ACNC: 25.7 SEC — HIGH (ref 10.5–13.4)
PROTHROM AB SERPL-ACNC: 28.4 SEC — HIGH (ref 10.5–13.4)
PROTHROM AB SERPL-ACNC: 28.5 SEC — HIGH (ref 10.5–13.4)
PROTHROM AB SERPL-ACNC: 28.7 SEC — HIGH (ref 10.5–13.4)
PTH-INTACT FLD-MCNC: 150 PG/ML — HIGH (ref 15–65)
PTH-INTACT FLD-MCNC: 66 PG/ML — HIGH (ref 15–65)
PTH-INTACT FLD-MCNC: 75 PG/ML — HIGH (ref 15–65)
PTH-INTACT FLD-MCNC: 84 PG/ML — HIGH (ref 15–65)
PTH-INTACT FLD-MCNC: 96 PG/ML — HIGH (ref 15–65)
PTH-INTACT FLD-MCNC: 97 PG/ML — HIGH (ref 15–65)
RBC # BLD: 2.34 M/UL — LOW (ref 4.2–5.8)
RBC # BLD: 2.48 M/UL — LOW (ref 4.2–5.8)
RBC # BLD: 2.52 M/UL — LOW (ref 4.2–5.8)
RBC # BLD: 2.54 M/UL — LOW (ref 4.2–5.8)
RBC # BLD: 2.55 M/UL — LOW (ref 4.2–5.8)
RBC # BLD: 2.67 M/UL — LOW (ref 4.2–5.8)
RBC # BLD: 2.67 M/UL — LOW (ref 4.2–5.8)
RBC # BLD: 2.73 M/UL — LOW (ref 4.2–5.8)
RBC # BLD: 2.74 M/UL — LOW (ref 4.2–5.8)
RBC # BLD: 2.82 M/UL — LOW (ref 4.2–5.8)
RBC # BLD: 2.82 M/UL — LOW (ref 4.2–5.8)
RBC # BLD: 2.88 M/UL — LOW (ref 4.2–5.8)
RBC # BLD: 2.89 M/UL — LOW (ref 4.2–5.8)
RBC # BLD: 2.91 M/UL — LOW (ref 4.2–5.8)
RBC # BLD: 2.91 M/UL — LOW (ref 4.2–5.8)
RBC # BLD: 2.92 M/UL — LOW (ref 4.2–5.8)
RBC # BLD: 2.92 M/UL — LOW (ref 4.2–5.8)
RBC # BLD: 3.04 M/UL — LOW (ref 4.2–5.8)
RBC # BLD: 3.07 M/UL — LOW (ref 4.2–5.8)
RBC # BLD: 3.12 M/UL — LOW (ref 4.2–5.8)
RBC # BLD: 3.13 M/UL — LOW (ref 4.2–5.8)
RBC # BLD: 3.17 M/UL — LOW (ref 4.2–5.8)
RBC # BLD: 3.17 M/UL — LOW (ref 4.2–5.8)
RBC # BLD: 3.18 M/UL — LOW (ref 4.2–5.8)
RBC # BLD: 3.19 M/UL — LOW (ref 4.2–5.8)
RBC # BLD: 3.22 M/UL — LOW (ref 4.2–5.8)
RBC # BLD: 3.24 M/UL — LOW (ref 4.2–5.8)
RBC # BLD: 3.28 M/UL — LOW (ref 4.2–5.8)
RBC # BLD: 3.29 M/UL — LOW (ref 4.2–5.8)
RBC # BLD: 3.39 M/UL — LOW (ref 4.2–5.8)
RBC # BLD: 3.4 M/UL — LOW (ref 4.2–5.8)
RBC # BLD: 3.4 M/UL — LOW (ref 4.2–5.8)
RBC # BLD: 3.42 M/UL — LOW (ref 4.2–5.8)
RBC # BLD: 3.43 M/UL — LOW (ref 4.2–5.8)
RBC # BLD: 3.44 M/UL — LOW (ref 4.2–5.8)
RBC # BLD: 3.46 M/UL — LOW (ref 4.2–5.8)
RBC # BLD: 3.46 M/UL — LOW (ref 4.2–5.8)
RBC # BLD: 3.47 M/UL — LOW (ref 4.2–5.8)
RBC # BLD: 3.48 M/UL — LOW (ref 4.2–5.8)
RBC # BLD: 3.54 M/UL — LOW (ref 4.2–5.8)
RBC # BLD: 3.56 M/UL — LOW (ref 4.2–5.8)
RBC # BLD: 3.58 M/UL — LOW (ref 4.2–5.8)
RBC # BLD: 3.58 M/UL — LOW (ref 4.2–5.8)
RBC # BLD: 3.63 M/UL — LOW (ref 4.2–5.8)
RBC # BLD: 3.65 M/UL — LOW (ref 4.2–5.8)
RBC # BLD: 3.66 M/UL — LOW (ref 4.2–5.8)
RBC # BLD: 3.69 M/UL — LOW (ref 4.2–5.8)
RBC # BLD: 3.69 M/UL — LOW (ref 4.2–5.8)
RBC # BLD: 3.72 M/UL — LOW (ref 4.2–5.8)
RBC # BLD: 3.76 M/UL — LOW (ref 4.2–5.8)
RBC # BLD: 3.78 M/UL — LOW (ref 4.2–5.8)
RBC # BLD: 3.8 M/UL — LOW (ref 4.2–5.8)
RBC # BLD: 3.81 M/UL — LOW (ref 4.2–5.8)
RBC # BLD: 3.84 M/UL — LOW (ref 4.2–5.8)
RBC # BLD: 3.86 M/UL — LOW (ref 4.2–5.8)
RBC # BLD: 3.88 M/UL — LOW (ref 4.2–5.8)
RBC # BLD: 3.89 M/UL — LOW (ref 4.2–5.8)
RBC # BLD: 3.99 M/UL — LOW (ref 4.2–5.8)
RBC # BLD: 3.99 M/UL — LOW (ref 4.2–5.8)
RBC # BLD: 4.01 M/UL — LOW (ref 4.2–5.8)
RBC # BLD: 4.05 M/UL — LOW (ref 4.2–5.8)
RBC # BLD: 4.09 M/UL — LOW (ref 4.2–5.8)
RBC # BLD: 4.12 M/UL — LOW (ref 4.2–5.8)
RBC # BLD: 4.16 M/UL — LOW (ref 4.2–5.8)
RBC # BLD: 4.23 M/UL — SIGNIFICANT CHANGE UP (ref 4.2–5.8)
RBC # BLD: 4.23 M/UL — SIGNIFICANT CHANGE UP (ref 4.2–5.8)
RBC # BLD: 4.24 M/UL — SIGNIFICANT CHANGE UP (ref 4.2–5.8)
RBC # BLD: 4.26 M/UL — SIGNIFICANT CHANGE UP (ref 4.2–5.8)
RBC # BLD: 4.26 M/UL — SIGNIFICANT CHANGE UP (ref 4.2–5.8)
RBC # BLD: 4.27 M/UL — SIGNIFICANT CHANGE UP (ref 4.2–5.8)
RBC # BLD: 4.41 M/UL — SIGNIFICANT CHANGE UP (ref 4.2–5.8)
RBC # BLD: 4.42 M/UL — SIGNIFICANT CHANGE UP (ref 4.2–5.8)
RBC # BLD: 4.48 M/UL — SIGNIFICANT CHANGE UP (ref 4.2–5.8)
RBC # BLD: 4.5 M/UL — SIGNIFICANT CHANGE UP (ref 4.2–5.8)
RBC # BLD: 4.52 M/UL — SIGNIFICANT CHANGE UP (ref 4.2–5.8)
RBC # BLD: 4.53 M/UL — SIGNIFICANT CHANGE UP (ref 4.2–5.8)
RBC # BLD: 4.58 M/UL — SIGNIFICANT CHANGE UP (ref 4.2–5.8)
RBC # BLD: 4.85 M/UL — SIGNIFICANT CHANGE UP (ref 4.2–5.8)
RBC # BLD: 4.97 M/UL — SIGNIFICANT CHANGE UP (ref 4.2–5.8)
RBC # BLD: 5.07 M/UL — SIGNIFICANT CHANGE UP (ref 4.2–5.8)
RBC # BLD: 5.09 M/UL — SIGNIFICANT CHANGE UP (ref 4.2–5.8)
RBC # BLD: 5.1 M/UL — SIGNIFICANT CHANGE UP (ref 4.2–5.8)
RBC # BLD: 5.13 M/UL — SIGNIFICANT CHANGE UP (ref 4.2–5.8)
RBC # BLD: 5.19 M/UL — SIGNIFICANT CHANGE UP (ref 4.2–5.8)
RBC # BLD: 5.2 M/UL — SIGNIFICANT CHANGE UP (ref 4.2–5.8)
RBC # BLD: 5.2 M/UL — SIGNIFICANT CHANGE UP (ref 4.2–5.8)
RBC # BLD: 5.3 M/UL — SIGNIFICANT CHANGE UP (ref 4.2–5.8)
RBC # BLD: 5.45 M/UL — SIGNIFICANT CHANGE UP (ref 4.2–5.8)
RBC # BLD: 5.55 M/UL — SIGNIFICANT CHANGE UP (ref 4.2–5.8)
RBC # BLD: 5.75 M/UL — SIGNIFICANT CHANGE UP (ref 4.2–5.8)
RBC # BLD: 5.78 M/UL — SIGNIFICANT CHANGE UP (ref 4.2–5.8)
RBC # FLD: 15.9 % — HIGH (ref 10.3–14.5)
RBC # FLD: 16 % — HIGH (ref 10.3–14.5)
RBC # FLD: 16.1 % — HIGH (ref 10.3–14.5)
RBC # FLD: 16.2 % — HIGH (ref 10.3–14.5)
RBC # FLD: 16.3 % — HIGH (ref 10.3–14.5)
RBC # FLD: 16.4 % — HIGH (ref 10.3–14.5)
RBC # FLD: 16.5 % — HIGH (ref 10.3–14.5)
RBC # FLD: 16.6 % — HIGH (ref 10.3–14.5)
RBC # FLD: 16.7 % — HIGH (ref 10.3–14.5)
RBC # FLD: 16.8 % — HIGH (ref 10.3–14.5)
RBC # FLD: 16.9 % — HIGH (ref 10.3–14.5)
RBC # FLD: 17 % — HIGH (ref 10.3–14.5)
RBC # FLD: 17.1 % — HIGH (ref 10.3–14.5)
RBC # FLD: 17.1 % — HIGH (ref 10.3–14.5)
RBC # FLD: 17.2 % — HIGH (ref 10.3–14.5)
RBC # FLD: 17.2 % — HIGH (ref 10.3–14.5)
RBC # FLD: 17.6 % — HIGH (ref 10.3–14.5)
RBC # FLD: 18.3 % — HIGH (ref 10.3–14.5)
RBC # FLD: 18.3 % — HIGH (ref 10.3–14.5)
RBC # FLD: 18.4 % — HIGH (ref 10.3–14.5)
RBC # FLD: 18.5 % — HIGH (ref 10.3–14.5)
RBC # FLD: 18.5 % — HIGH (ref 10.3–14.5)
RBC # FLD: 18.6 % — HIGH (ref 10.3–14.5)
RBC # FLD: 18.7 % — HIGH (ref 10.3–14.5)
RBC # FLD: 18.7 % — HIGH (ref 10.3–14.5)
RBC # FLD: 18.9 % — HIGH (ref 10.3–14.5)
RBC # FLD: 19 % — HIGH (ref 10.3–14.5)
RBC # FLD: 19.2 % — HIGH (ref 10.3–14.5)
RBC # FLD: 19.2 % — HIGH (ref 10.3–14.5)
RBC # FLD: 19.3 % — HIGH (ref 10.3–14.5)
RBC # FLD: 19.4 % — HIGH (ref 10.3–14.5)
RBC # FLD: 19.5 % — HIGH (ref 10.3–14.5)
RBC # FLD: 19.5 % — HIGH (ref 10.3–14.5)
RBC # FLD: 19.6 % — HIGH (ref 10.3–14.5)
RBC # FLD: 19.7 % — HIGH (ref 10.3–14.5)
RBC # FLD: 19.8 % — HIGH (ref 10.3–14.5)
RBC # FLD: 19.9 % — HIGH (ref 10.3–14.5)
RBC # FLD: 19.9 % — HIGH (ref 10.3–14.5)
RBC # FLD: 20 % — HIGH (ref 10.3–14.5)
RBC # FLD: 20.1 % — HIGH (ref 10.3–14.5)
RBC CASTS # UR COMP ASSIST: 2 /HPF — SIGNIFICANT CHANGE UP (ref 0–4)
RBC CASTS # UR COMP ASSIST: 20 /HPF — HIGH (ref 0–4)
RBC CASTS # UR COMP ASSIST: 7 /HPF — HIGH (ref 0–4)
RETICS #: 145.8 K/UL — HIGH (ref 25–125)
RETICS/RBC NFR: 5 % — HIGH (ref 0.5–2.5)
RH IG SCN BLD-IMP: POSITIVE — SIGNIFICANT CHANGE UP
SAO2 % BLDA: 95.8 % — SIGNIFICANT CHANGE UP (ref 94–98)
SAO2 % BLDA: 97.7 % — SIGNIFICANT CHANGE UP (ref 94–98)
SAO2 % BLDV: 40.4 % — LOW (ref 67–88)
SAO2 % BLDV: 40.8 % — LOW (ref 67–88)
SAO2 % BLDV: 44.6 % — LOW (ref 67–88)
SAO2 % BLDV: 56.9 % — LOW (ref 67–88)
SARS-COV-2 RNA SPEC QL NAA+PROBE: SIGNIFICANT CHANGE UP
SODIUM SERPL-SCNC: 128 MMOL/L — LOW (ref 135–145)
SODIUM SERPL-SCNC: 129 MMOL/L — LOW (ref 135–145)
SODIUM SERPL-SCNC: 130 MMOL/L — LOW (ref 135–145)
SODIUM SERPL-SCNC: 131 MMOL/L — LOW (ref 135–145)
SODIUM SERPL-SCNC: 132 MMOL/L — LOW (ref 135–145)
SODIUM SERPL-SCNC: 133 MMOL/L — LOW (ref 135–145)
SODIUM SERPL-SCNC: 134 MMOL/L — LOW (ref 135–145)
SODIUM SERPL-SCNC: 135 MMOL/L — SIGNIFICANT CHANGE UP (ref 135–145)
SODIUM SERPL-SCNC: 136 MMOL/L — SIGNIFICANT CHANGE UP (ref 135–145)
SODIUM SERPL-SCNC: 137 MMOL/L — SIGNIFICANT CHANGE UP (ref 135–145)
SODIUM SERPL-SCNC: 138 MMOL/L — SIGNIFICANT CHANGE UP (ref 135–145)
SODIUM SERPL-SCNC: 139 MMOL/L — SIGNIFICANT CHANGE UP (ref 135–145)
SODIUM SERPL-SCNC: 139 MMOL/L — SIGNIFICANT CHANGE UP (ref 135–145)
SODIUM UR-SCNC: 11 MMOL/L — SIGNIFICANT CHANGE UP
SODIUM UR-SCNC: 55 MMOL/L — SIGNIFICANT CHANGE UP
SP GR SPEC: 1.01 — SIGNIFICANT CHANGE UP (ref 1.01–1.02)
SP GR SPEC: 1.01 — SIGNIFICANT CHANGE UP (ref 1.01–1.02)
SP GR SPEC: 1.02 — SIGNIFICANT CHANGE UP (ref 1.01–1.02)
SPECIMEN SOURCE: SIGNIFICANT CHANGE UP
T4 FREE SERPL-MCNC: 1.2 NG/DL — SIGNIFICANT CHANGE UP (ref 0.9–1.8)
TIBC SERPL-MCNC: 239 UG/DL — SIGNIFICANT CHANGE UP (ref 220–430)
TIBC SERPL-MCNC: 243 UG/DL — SIGNIFICANT CHANGE UP (ref 220–430)
TIBC SERPL-MCNC: 245 UG/DL — SIGNIFICANT CHANGE UP (ref 220–430)
TROPONIN T, HIGH SENSITIVITY RESULT: 1127 NG/L — HIGH (ref 0–51)
TROPONIN T, HIGH SENSITIVITY RESULT: 1192 NG/L — HIGH (ref 0–51)
TROPONIN T, HIGH SENSITIVITY RESULT: 193 NG/L — HIGH (ref 0–51)
TROPONIN T, HIGH SENSITIVITY RESULT: 2016 NG/L — HIGH (ref 0–51)
TROPONIN T, HIGH SENSITIVITY RESULT: 226 NG/L — HIGH (ref 0–51)
TROPONIN T, HIGH SENSITIVITY RESULT: 932 NG/L — HIGH (ref 0–51)
TROPONIN T, HIGH SENSITIVITY RESULT: 971 NG/L — HIGH (ref 0–51)
TSH SERPL-MCNC: 1.71 UIU/ML — SIGNIFICANT CHANGE UP (ref 0.27–4.2)
TSH SERPL-MCNC: 1.98 UIU/ML — SIGNIFICANT CHANGE UP (ref 0.27–4.2)
TSH SERPL-MCNC: 3.08 UIU/ML — SIGNIFICANT CHANGE UP (ref 0.27–4.2)
UIBC SERPL-MCNC: 212 UG/DL — SIGNIFICANT CHANGE UP (ref 110–370)
UIBC SERPL-MCNC: 223 UG/DL — SIGNIFICANT CHANGE UP (ref 110–370)
UIBC SERPL-MCNC: 224 UG/DL — SIGNIFICANT CHANGE UP (ref 110–370)
URATE SERPL-MCNC: 5.2 MG/DL — SIGNIFICANT CHANGE UP (ref 3.4–8.8)
UROBILINOGEN FLD QL: NEGATIVE — SIGNIFICANT CHANGE UP
UUN UR-MCNC: 177 MG/DL — SIGNIFICANT CHANGE UP
VIT B12 SERPL-MCNC: 467 PG/ML — SIGNIFICANT CHANGE UP (ref 232–1245)
WBC # BLD: 10.12 K/UL — SIGNIFICANT CHANGE UP (ref 3.8–10.5)
WBC # BLD: 10.23 K/UL — SIGNIFICANT CHANGE UP (ref 3.8–10.5)
WBC # BLD: 10.26 K/UL — SIGNIFICANT CHANGE UP (ref 3.8–10.5)
WBC # BLD: 10.33 K/UL — SIGNIFICANT CHANGE UP (ref 3.8–10.5)
WBC # BLD: 10.43 K/UL — SIGNIFICANT CHANGE UP (ref 3.8–10.5)
WBC # BLD: 10.46 K/UL — SIGNIFICANT CHANGE UP (ref 3.8–10.5)
WBC # BLD: 10.53 K/UL — HIGH (ref 3.8–10.5)
WBC # BLD: 10.53 K/UL — HIGH (ref 3.8–10.5)
WBC # BLD: 10.55 K/UL — HIGH (ref 3.8–10.5)
WBC # BLD: 10.95 K/UL — HIGH (ref 3.8–10.5)
WBC # BLD: 11.04 K/UL — HIGH (ref 3.8–10.5)
WBC # BLD: 11.08 K/UL — HIGH (ref 3.8–10.5)
WBC # BLD: 11.1 K/UL — HIGH (ref 3.8–10.5)
WBC # BLD: 11.12 K/UL — HIGH (ref 3.8–10.5)
WBC # BLD: 11.13 K/UL — HIGH (ref 3.8–10.5)
WBC # BLD: 11.21 K/UL — HIGH (ref 3.8–10.5)
WBC # BLD: 11.26 K/UL — HIGH (ref 3.8–10.5)
WBC # BLD: 11.34 K/UL — HIGH (ref 3.8–10.5)
WBC # BLD: 11.8 K/UL — HIGH (ref 3.8–10.5)
WBC # BLD: 11.85 K/UL — HIGH (ref 3.8–10.5)
WBC # BLD: 11.85 K/UL — HIGH (ref 3.8–10.5)
WBC # BLD: 11.97 K/UL — HIGH (ref 3.8–10.5)
WBC # BLD: 12.06 K/UL — HIGH (ref 3.8–10.5)
WBC # BLD: 12.17 K/UL — HIGH (ref 3.8–10.5)
WBC # BLD: 12.24 K/UL — HIGH (ref 3.8–10.5)
WBC # BLD: 12.3 K/UL — HIGH (ref 3.8–10.5)
WBC # BLD: 12.42 K/UL — HIGH (ref 3.8–10.5)
WBC # BLD: 12.48 K/UL — HIGH (ref 3.8–10.5)
WBC # BLD: 12.91 K/UL — HIGH (ref 3.8–10.5)
WBC # BLD: 12.92 K/UL — HIGH (ref 3.8–10.5)
WBC # BLD: 12.94 K/UL — HIGH (ref 3.8–10.5)
WBC # BLD: 13.04 K/UL — HIGH (ref 3.8–10.5)
WBC # BLD: 13.07 K/UL — HIGH (ref 3.8–10.5)
WBC # BLD: 13.28 K/UL — HIGH (ref 3.8–10.5)
WBC # BLD: 13.52 K/UL — HIGH (ref 3.8–10.5)
WBC # BLD: 13.96 K/UL — HIGH (ref 3.8–10.5)
WBC # BLD: 14 K/UL — HIGH (ref 3.8–10.5)
WBC # BLD: 14.03 K/UL — HIGH (ref 3.8–10.5)
WBC # BLD: 14.03 K/UL — HIGH (ref 3.8–10.5)
WBC # BLD: 14.14 K/UL — HIGH (ref 3.8–10.5)
WBC # BLD: 14.26 K/UL — HIGH (ref 3.8–10.5)
WBC # BLD: 14.31 K/UL — HIGH (ref 3.8–10.5)
WBC # BLD: 14.48 K/UL — HIGH (ref 3.8–10.5)
WBC # BLD: 14.72 K/UL — HIGH (ref 3.8–10.5)
WBC # BLD: 15.1 K/UL — HIGH (ref 3.8–10.5)
WBC # BLD: 15.29 K/UL — HIGH (ref 3.8–10.5)
WBC # BLD: 15.61 K/UL — HIGH (ref 3.8–10.5)
WBC # BLD: 16.12 K/UL — HIGH (ref 3.8–10.5)
WBC # BLD: 16.22 K/UL — HIGH (ref 3.8–10.5)
WBC # BLD: 16.41 K/UL — HIGH (ref 3.8–10.5)
WBC # BLD: 16.42 K/UL — HIGH (ref 3.8–10.5)
WBC # BLD: 16.73 K/UL — HIGH (ref 3.8–10.5)
WBC # BLD: 17.08 K/UL — HIGH (ref 3.8–10.5)
WBC # BLD: 17.29 K/UL — HIGH (ref 3.8–10.5)
WBC # BLD: 17.38 K/UL — HIGH (ref 3.8–10.5)
WBC # BLD: 17.7 K/UL — HIGH (ref 3.8–10.5)
WBC # BLD: 17.74 K/UL — HIGH (ref 3.8–10.5)
WBC # BLD: 17.99 K/UL — HIGH (ref 3.8–10.5)
WBC # BLD: 18.04 K/UL — HIGH (ref 3.8–10.5)
WBC # BLD: 18.13 K/UL — HIGH (ref 3.8–10.5)
WBC # BLD: 18.15 K/UL — HIGH (ref 3.8–10.5)
WBC # BLD: 18.65 K/UL — HIGH (ref 3.8–10.5)
WBC # BLD: 19.15 K/UL — HIGH (ref 3.8–10.5)
WBC # BLD: 19.15 K/UL — HIGH (ref 3.8–10.5)
WBC # BLD: 19.23 K/UL — HIGH (ref 3.8–10.5)
WBC # BLD: 19.31 K/UL — HIGH (ref 3.8–10.5)
WBC # BLD: 19.34 K/UL — HIGH (ref 3.8–10.5)
WBC # BLD: 19.43 K/UL — HIGH (ref 3.8–10.5)
WBC # BLD: 19.72 K/UL — HIGH (ref 3.8–10.5)
WBC # BLD: 20.28 K/UL — HIGH (ref 3.8–10.5)
WBC # BLD: 20.91 K/UL — HIGH (ref 3.8–10.5)
WBC # BLD: 21.24 K/UL — HIGH (ref 3.8–10.5)
WBC # BLD: 21.85 K/UL — HIGH (ref 3.8–10.5)
WBC # BLD: 24.02 K/UL — HIGH (ref 3.8–10.5)
WBC # BLD: 7.6 K/UL — SIGNIFICANT CHANGE UP (ref 3.8–10.5)
WBC # BLD: 7.69 K/UL — SIGNIFICANT CHANGE UP (ref 3.8–10.5)
WBC # BLD: 7.7 K/UL — SIGNIFICANT CHANGE UP (ref 3.8–10.5)
WBC # BLD: 7.84 K/UL — SIGNIFICANT CHANGE UP (ref 3.8–10.5)
WBC # BLD: 8.4 K/UL — SIGNIFICANT CHANGE UP (ref 3.8–10.5)
WBC # BLD: 8.48 K/UL — SIGNIFICANT CHANGE UP (ref 3.8–10.5)
WBC # BLD: 8.55 K/UL — SIGNIFICANT CHANGE UP (ref 3.8–10.5)
WBC # BLD: 8.64 K/UL — SIGNIFICANT CHANGE UP (ref 3.8–10.5)
WBC # BLD: 9.2 K/UL — SIGNIFICANT CHANGE UP (ref 3.8–10.5)
WBC # BLD: 9.22 K/UL — SIGNIFICANT CHANGE UP (ref 3.8–10.5)
WBC # BLD: 9.24 K/UL — SIGNIFICANT CHANGE UP (ref 3.8–10.5)
WBC # BLD: 9.3 K/UL — SIGNIFICANT CHANGE UP (ref 3.8–10.5)
WBC # BLD: 9.42 K/UL — SIGNIFICANT CHANGE UP (ref 3.8–10.5)
WBC # BLD: 9.47 K/UL — SIGNIFICANT CHANGE UP (ref 3.8–10.5)
WBC # BLD: 9.75 K/UL — SIGNIFICANT CHANGE UP (ref 3.8–10.5)
WBC # BLD: 9.94 K/UL — SIGNIFICANT CHANGE UP (ref 3.8–10.5)
WBC # FLD AUTO: 10.12 K/UL — SIGNIFICANT CHANGE UP (ref 3.8–10.5)
WBC # FLD AUTO: 10.23 K/UL — SIGNIFICANT CHANGE UP (ref 3.8–10.5)
WBC # FLD AUTO: 10.26 K/UL — SIGNIFICANT CHANGE UP (ref 3.8–10.5)
WBC # FLD AUTO: 10.33 K/UL — SIGNIFICANT CHANGE UP (ref 3.8–10.5)
WBC # FLD AUTO: 10.43 K/UL — SIGNIFICANT CHANGE UP (ref 3.8–10.5)
WBC # FLD AUTO: 10.46 K/UL — SIGNIFICANT CHANGE UP (ref 3.8–10.5)
WBC # FLD AUTO: 10.53 K/UL — HIGH (ref 3.8–10.5)
WBC # FLD AUTO: 10.53 K/UL — HIGH (ref 3.8–10.5)
WBC # FLD AUTO: 10.55 K/UL — HIGH (ref 3.8–10.5)
WBC # FLD AUTO: 10.95 K/UL — HIGH (ref 3.8–10.5)
WBC # FLD AUTO: 11.04 K/UL — HIGH (ref 3.8–10.5)
WBC # FLD AUTO: 11.08 K/UL — HIGH (ref 3.8–10.5)
WBC # FLD AUTO: 11.1 K/UL — HIGH (ref 3.8–10.5)
WBC # FLD AUTO: 11.12 K/UL — HIGH (ref 3.8–10.5)
WBC # FLD AUTO: 11.13 K/UL — HIGH (ref 3.8–10.5)
WBC # FLD AUTO: 11.21 K/UL — HIGH (ref 3.8–10.5)
WBC # FLD AUTO: 11.26 K/UL — HIGH (ref 3.8–10.5)
WBC # FLD AUTO: 11.34 K/UL — HIGH (ref 3.8–10.5)
WBC # FLD AUTO: 11.8 K/UL — HIGH (ref 3.8–10.5)
WBC # FLD AUTO: 11.85 K/UL — HIGH (ref 3.8–10.5)
WBC # FLD AUTO: 11.85 K/UL — HIGH (ref 3.8–10.5)
WBC # FLD AUTO: 11.97 K/UL — HIGH (ref 3.8–10.5)
WBC # FLD AUTO: 12.06 K/UL — HIGH (ref 3.8–10.5)
WBC # FLD AUTO: 12.17 K/UL — HIGH (ref 3.8–10.5)
WBC # FLD AUTO: 12.24 K/UL — HIGH (ref 3.8–10.5)
WBC # FLD AUTO: 12.3 K/UL — HIGH (ref 3.8–10.5)
WBC # FLD AUTO: 12.42 K/UL — HIGH (ref 3.8–10.5)
WBC # FLD AUTO: 12.48 K/UL — HIGH (ref 3.8–10.5)
WBC # FLD AUTO: 12.91 K/UL — HIGH (ref 3.8–10.5)
WBC # FLD AUTO: 12.92 K/UL — HIGH (ref 3.8–10.5)
WBC # FLD AUTO: 12.94 K/UL — HIGH (ref 3.8–10.5)
WBC # FLD AUTO: 13.04 K/UL — HIGH (ref 3.8–10.5)
WBC # FLD AUTO: 13.07 K/UL — HIGH (ref 3.8–10.5)
WBC # FLD AUTO: 13.28 K/UL — HIGH (ref 3.8–10.5)
WBC # FLD AUTO: 13.52 K/UL — HIGH (ref 3.8–10.5)
WBC # FLD AUTO: 13.96 K/UL — HIGH (ref 3.8–10.5)
WBC # FLD AUTO: 14 K/UL — HIGH (ref 3.8–10.5)
WBC # FLD AUTO: 14.03 K/UL — HIGH (ref 3.8–10.5)
WBC # FLD AUTO: 14.03 K/UL — HIGH (ref 3.8–10.5)
WBC # FLD AUTO: 14.14 K/UL — HIGH (ref 3.8–10.5)
WBC # FLD AUTO: 14.26 K/UL — HIGH (ref 3.8–10.5)
WBC # FLD AUTO: 14.31 K/UL — HIGH (ref 3.8–10.5)
WBC # FLD AUTO: 14.48 K/UL — HIGH (ref 3.8–10.5)
WBC # FLD AUTO: 14.72 K/UL — HIGH (ref 3.8–10.5)
WBC # FLD AUTO: 15.1 K/UL — HIGH (ref 3.8–10.5)
WBC # FLD AUTO: 15.29 K/UL — HIGH (ref 3.8–10.5)
WBC # FLD AUTO: 15.61 K/UL — HIGH (ref 3.8–10.5)
WBC # FLD AUTO: 16.12 K/UL — HIGH (ref 3.8–10.5)
WBC # FLD AUTO: 16.22 K/UL — HIGH (ref 3.8–10.5)
WBC # FLD AUTO: 16.41 K/UL — HIGH (ref 3.8–10.5)
WBC # FLD AUTO: 16.42 K/UL — HIGH (ref 3.8–10.5)
WBC # FLD AUTO: 16.73 K/UL — HIGH (ref 3.8–10.5)
WBC # FLD AUTO: 17.08 K/UL — HIGH (ref 3.8–10.5)
WBC # FLD AUTO: 17.29 K/UL — HIGH (ref 3.8–10.5)
WBC # FLD AUTO: 17.38 K/UL — HIGH (ref 3.8–10.5)
WBC # FLD AUTO: 17.7 K/UL — HIGH (ref 3.8–10.5)
WBC # FLD AUTO: 17.74 K/UL — HIGH (ref 3.8–10.5)
WBC # FLD AUTO: 17.99 K/UL — HIGH (ref 3.8–10.5)
WBC # FLD AUTO: 18.04 K/UL — HIGH (ref 3.8–10.5)
WBC # FLD AUTO: 18.13 K/UL — HIGH (ref 3.8–10.5)
WBC # FLD AUTO: 18.15 K/UL — HIGH (ref 3.8–10.5)
WBC # FLD AUTO: 18.65 K/UL — HIGH (ref 3.8–10.5)
WBC # FLD AUTO: 19.15 K/UL — HIGH (ref 3.8–10.5)
WBC # FLD AUTO: 19.15 K/UL — HIGH (ref 3.8–10.5)
WBC # FLD AUTO: 19.23 K/UL — HIGH (ref 3.8–10.5)
WBC # FLD AUTO: 19.31 K/UL — HIGH (ref 3.8–10.5)
WBC # FLD AUTO: 19.34 K/UL — HIGH (ref 3.8–10.5)
WBC # FLD AUTO: 19.43 K/UL — HIGH (ref 3.8–10.5)
WBC # FLD AUTO: 19.72 K/UL — HIGH (ref 3.8–10.5)
WBC # FLD AUTO: 20.28 K/UL — HIGH (ref 3.8–10.5)
WBC # FLD AUTO: 20.91 K/UL — HIGH (ref 3.8–10.5)
WBC # FLD AUTO: 21.24 K/UL — HIGH (ref 3.8–10.5)
WBC # FLD AUTO: 21.85 K/UL — HIGH (ref 3.8–10.5)
WBC # FLD AUTO: 24.02 K/UL — HIGH (ref 3.8–10.5)
WBC # FLD AUTO: 7.6 K/UL — SIGNIFICANT CHANGE UP (ref 3.8–10.5)
WBC # FLD AUTO: 7.69 K/UL — SIGNIFICANT CHANGE UP (ref 3.8–10.5)
WBC # FLD AUTO: 7.7 K/UL — SIGNIFICANT CHANGE UP (ref 3.8–10.5)
WBC # FLD AUTO: 7.84 K/UL — SIGNIFICANT CHANGE UP (ref 3.8–10.5)
WBC # FLD AUTO: 8.4 K/UL — SIGNIFICANT CHANGE UP (ref 3.8–10.5)
WBC # FLD AUTO: 8.48 K/UL — SIGNIFICANT CHANGE UP (ref 3.8–10.5)
WBC # FLD AUTO: 8.55 K/UL — SIGNIFICANT CHANGE UP (ref 3.8–10.5)
WBC # FLD AUTO: 8.64 K/UL — SIGNIFICANT CHANGE UP (ref 3.8–10.5)
WBC # FLD AUTO: 9.2 K/UL — SIGNIFICANT CHANGE UP (ref 3.8–10.5)
WBC # FLD AUTO: 9.22 K/UL — SIGNIFICANT CHANGE UP (ref 3.8–10.5)
WBC # FLD AUTO: 9.24 K/UL — SIGNIFICANT CHANGE UP (ref 3.8–10.5)
WBC # FLD AUTO: 9.3 K/UL — SIGNIFICANT CHANGE UP (ref 3.8–10.5)
WBC # FLD AUTO: 9.42 K/UL — SIGNIFICANT CHANGE UP (ref 3.8–10.5)
WBC # FLD AUTO: 9.47 K/UL — SIGNIFICANT CHANGE UP (ref 3.8–10.5)
WBC # FLD AUTO: 9.75 K/UL — SIGNIFICANT CHANGE UP (ref 3.8–10.5)
WBC # FLD AUTO: 9.94 K/UL — SIGNIFICANT CHANGE UP (ref 3.8–10.5)
WBC UR QL: 1 /HPF — SIGNIFICANT CHANGE UP (ref 0–5)
WBC UR QL: 1 /HPF — SIGNIFICANT CHANGE UP (ref 0–5)
WBC UR QL: 622 /HPF — HIGH (ref 0–5)

## 2022-01-01 PROCEDURE — 36556 INSERT NON-TUNNEL CV CATH: CPT

## 2022-01-01 PROCEDURE — 83735 ASSAY OF MAGNESIUM: CPT

## 2022-01-01 PROCEDURE — 71045 X-RAY EXAM CHEST 1 VIEW: CPT | Mod: 26,77

## 2022-01-01 PROCEDURE — 84439 ASSAY OF FREE THYROXINE: CPT

## 2022-01-01 PROCEDURE — 85610 PROTHROMBIN TIME: CPT

## 2022-01-01 PROCEDURE — 71045 X-RAY EXAM CHEST 1 VIEW: CPT | Mod: 26

## 2022-01-01 PROCEDURE — 86850 RBC ANTIBODY SCREEN: CPT

## 2022-01-01 PROCEDURE — 99291 CRITICAL CARE FIRST HOUR: CPT

## 2022-01-01 PROCEDURE — 74176 CT ABD & PELVIS W/O CONTRAST: CPT | Mod: MG

## 2022-01-01 PROCEDURE — 93010 ELECTROCARDIOGRAM REPORT: CPT

## 2022-01-01 PROCEDURE — 83036 HEMOGLOBIN GLYCOSYLATED A1C: CPT

## 2022-01-01 PROCEDURE — 43255 EGD CONTROL BLEEDING ANY: CPT

## 2022-01-01 PROCEDURE — 71250 CT THORAX DX C-: CPT

## 2022-01-01 PROCEDURE — 36247 INS CATH ABD/L-EXT ART 3RD: CPT

## 2022-01-01 PROCEDURE — 36600 WITHDRAWAL OF ARTERIAL BLOOD: CPT

## 2022-01-01 PROCEDURE — 97535 SELF CARE MNGMENT TRAINING: CPT

## 2022-01-01 PROCEDURE — C1887: CPT

## 2022-01-01 PROCEDURE — 93010 ELECTROCARDIOGRAM REPORT: CPT | Mod: 77

## 2022-01-01 PROCEDURE — 77001 FLUOROGUIDE FOR VEIN DEVICE: CPT

## 2022-01-01 PROCEDURE — 85018 HEMOGLOBIN: CPT

## 2022-01-01 PROCEDURE — P9016: CPT

## 2022-01-01 PROCEDURE — 70450 CT HEAD/BRAIN W/O DYE: CPT | Mod: 26

## 2022-01-01 PROCEDURE — 99261: CPT

## 2022-01-01 PROCEDURE — 84100 ASSAY OF PHOSPHORUS: CPT

## 2022-01-01 PROCEDURE — 71250 CT THORAX DX C-: CPT | Mod: 26

## 2022-01-01 PROCEDURE — 99292 CRITICAL CARE ADDL 30 MIN: CPT

## 2022-01-01 PROCEDURE — 94640 AIRWAY INHALATION TREATMENT: CPT

## 2022-01-01 PROCEDURE — 83615 LACTATE (LD) (LDH) ENZYME: CPT

## 2022-01-01 PROCEDURE — 99221 1ST HOSP IP/OBS SF/LOW 40: CPT

## 2022-01-01 PROCEDURE — 99231 SBSQ HOSP IP/OBS SF/LOW 25: CPT

## 2022-01-01 PROCEDURE — P9059: CPT

## 2022-01-01 PROCEDURE — 82436 ASSAY OF URINE CHLORIDE: CPT

## 2022-01-01 PROCEDURE — 99233 SBSQ HOSP IP/OBS HIGH 50: CPT

## 2022-01-01 PROCEDURE — 70498 CT ANGIOGRAPHY NECK: CPT

## 2022-01-01 PROCEDURE — C1769: CPT

## 2022-01-01 PROCEDURE — 80074 ACUTE HEPATITIS PANEL: CPT

## 2022-01-01 PROCEDURE — 82728 ASSAY OF FERRITIN: CPT

## 2022-01-01 PROCEDURE — 86160 COMPLEMENT ANTIGEN: CPT

## 2022-01-01 PROCEDURE — 99232 SBSQ HOSP IP/OBS MODERATE 35: CPT

## 2022-01-01 PROCEDURE — 71045 X-RAY EXAM CHEST 1 VIEW: CPT | Mod: 26,76

## 2022-01-01 PROCEDURE — C1894: CPT

## 2022-01-01 PROCEDURE — 86900 BLOOD TYPING SEROLOGIC ABO: CPT

## 2022-01-01 PROCEDURE — 85025 COMPLETE CBC W/AUTO DIFF WBC: CPT

## 2022-01-01 PROCEDURE — 36245 INS CATH ABD/L-EXT ART 1ST: CPT | Mod: 59

## 2022-01-01 PROCEDURE — 93880 EXTRACRANIAL BILAT STUDY: CPT | Mod: 26

## 2022-01-01 PROCEDURE — 81001 URINALYSIS AUTO W/SCOPE: CPT

## 2022-01-01 PROCEDURE — 99223 1ST HOSP IP/OBS HIGH 75: CPT | Mod: GC

## 2022-01-01 PROCEDURE — 82565 ASSAY OF CREATININE: CPT

## 2022-01-01 PROCEDURE — 70498 CT ANGIOGRAPHY NECK: CPT | Mod: 26

## 2022-01-01 PROCEDURE — 99024 POSTOP FOLLOW-UP VISIT: CPT

## 2022-01-01 PROCEDURE — 0042T: CPT

## 2022-01-01 PROCEDURE — 86706 HEP B SURFACE ANTIBODY: CPT

## 2022-01-01 PROCEDURE — 36821 AV FUSION DIRECT ANY SITE: CPT | Mod: LT,59

## 2022-01-01 PROCEDURE — 95700 EEG CONT REC W/VID EEG TECH: CPT

## 2022-01-01 PROCEDURE — 99291 CRITICAL CARE FIRST HOUR: CPT | Mod: 25

## 2022-01-01 PROCEDURE — 86901 BLOOD TYPING SEROLOGIC RH(D): CPT

## 2022-01-01 PROCEDURE — 82435 ASSAY OF BLOOD CHLORIDE: CPT

## 2022-01-01 PROCEDURE — 86704 HEP B CORE ANTIBODY TOTAL: CPT

## 2022-01-01 PROCEDURE — 95718 EEG PHYS/QHP 2-12 HR W/VEEG: CPT

## 2022-01-01 PROCEDURE — 70551 MRI BRAIN STEM W/O DYE: CPT | Mod: 26

## 2022-01-01 PROCEDURE — 84165 PROTEIN E-PHORESIS SERUM: CPT

## 2022-01-01 PROCEDURE — 82570 ASSAY OF URINE CREATININE: CPT

## 2022-01-01 PROCEDURE — 87340 HEPATITIS B SURFACE AG IA: CPT

## 2022-01-01 PROCEDURE — 70547 MR ANGIOGRAPHY NECK W/O DYE: CPT

## 2022-01-01 PROCEDURE — C2628: CPT

## 2022-01-01 PROCEDURE — 73630 X-RAY EXAM OF FOOT: CPT | Mod: 26,LT

## 2022-01-01 PROCEDURE — C1725: CPT

## 2022-01-01 PROCEDURE — C8929: CPT

## 2022-01-01 PROCEDURE — 99232 SBSQ HOSP IP/OBS MODERATE 35: CPT | Mod: 57

## 2022-01-01 PROCEDURE — U0003: CPT

## 2022-01-01 PROCEDURE — 93970 EXTREMITY STUDY: CPT | Mod: 26

## 2022-01-01 PROCEDURE — 93306 TTE W/DOPPLER COMPLETE: CPT | Mod: 26

## 2022-01-01 PROCEDURE — 82803 BLOOD GASES ANY COMBINATION: CPT

## 2022-01-01 PROCEDURE — 70544 MR ANGIOGRAPHY HEAD W/O DYE: CPT

## 2022-01-01 PROCEDURE — 77001 FLUOROGUIDE FOR VEIN DEVICE: CPT | Mod: 26

## 2022-01-01 PROCEDURE — 37244 VASC EMBOLIZE/OCCLUDE BLEED: CPT

## 2022-01-01 PROCEDURE — C1768: CPT

## 2022-01-01 PROCEDURE — C1889: CPT

## 2022-01-01 PROCEDURE — 84520 ASSAY OF UREA NITROGEN: CPT

## 2022-01-01 PROCEDURE — 80053 COMPREHEN METABOLIC PANEL: CPT

## 2022-01-01 PROCEDURE — 94002 VENT MGMT INPAT INIT DAY: CPT

## 2022-01-01 PROCEDURE — 84132 ASSAY OF SERUM POTASSIUM: CPT

## 2022-01-01 PROCEDURE — 84540 ASSAY OF URINE/UREA-N: CPT

## 2022-01-01 PROCEDURE — 83540 ASSAY OF IRON: CPT

## 2022-01-01 PROCEDURE — 99232 SBSQ HOSP IP/OBS MODERATE 35: CPT | Mod: 24

## 2022-01-01 PROCEDURE — 86036 ANCA SCREEN EACH ANTIBODY: CPT

## 2022-01-01 PROCEDURE — 99223 1ST HOSP IP/OBS HIGH 75: CPT

## 2022-01-01 PROCEDURE — 84295 ASSAY OF SERUM SODIUM: CPT

## 2022-01-01 PROCEDURE — 36415 COLL VENOUS BLD VENIPUNCTURE: CPT

## 2022-01-01 PROCEDURE — 99153 MOD SED SAME PHYS/QHP EA: CPT

## 2022-01-01 PROCEDURE — 82550 ASSAY OF CK (CPK): CPT

## 2022-01-01 PROCEDURE — 93454 CORONARY ARTERY ANGIO S&I: CPT | Mod: 59

## 2022-01-01 PROCEDURE — 70544 MR ANGIOGRAPHY HEAD W/O DYE: CPT | Mod: 26

## 2022-01-01 PROCEDURE — 82272 OCCULT BLD FECES 1-3 TESTS: CPT

## 2022-01-01 PROCEDURE — 87070 CULTURE OTHR SPECIMN AEROBIC: CPT

## 2022-01-01 PROCEDURE — 84145 PROCALCITONIN (PCT): CPT

## 2022-01-01 PROCEDURE — 70551 MRI BRAIN STEM W/O DYE: CPT

## 2022-01-01 PROCEDURE — 82140 ASSAY OF AMMONIA: CPT

## 2022-01-01 PROCEDURE — G1004: CPT

## 2022-01-01 PROCEDURE — 82330 ASSAY OF CALCIUM: CPT

## 2022-01-01 PROCEDURE — P9040: CPT

## 2022-01-01 PROCEDURE — 85014 HEMATOCRIT: CPT

## 2022-01-01 PROCEDURE — 87086 URINE CULTURE/COLONY COUNT: CPT

## 2022-01-01 PROCEDURE — 92978 ENDOLUMINL IVUS OCT C 1ST: CPT | Mod: LD

## 2022-01-01 PROCEDURE — 92610 EVALUATE SWALLOWING FUNCTION: CPT

## 2022-01-01 PROCEDURE — 83970 ASSAY OF PARATHORMONE: CPT

## 2022-01-01 PROCEDURE — 93306 TTE W/DOPPLER COMPLETE: CPT

## 2022-01-01 PROCEDURE — 99285 EMERGENCY DEPT VISIT HI MDM: CPT

## 2022-01-01 PROCEDURE — 82553 CREATINE MB FRACTION: CPT

## 2022-01-01 PROCEDURE — 87186 SC STD MICRODIL/AGAR DIL: CPT

## 2022-01-01 PROCEDURE — 82947 ASSAY GLUCOSE BLOOD QUANT: CPT

## 2022-01-01 PROCEDURE — 83516 IMMUNOASSAY NONANTIBODY: CPT

## 2022-01-01 PROCEDURE — 70450 CT HEAD/BRAIN W/O DYE: CPT

## 2022-01-01 PROCEDURE — 93005 ELECTROCARDIOGRAM TRACING: CPT

## 2022-01-01 PROCEDURE — 74174 CTA ABD&PLVS W/CONTRAST: CPT | Mod: 26

## 2022-01-01 PROCEDURE — 36832 AV FISTULA REVISION OPEN: CPT | Mod: LT

## 2022-01-01 PROCEDURE — 36558 INSERT TUNNELED CV CATH: CPT

## 2022-01-01 PROCEDURE — 76937 US GUIDE VASCULAR ACCESS: CPT

## 2022-01-01 PROCEDURE — 71045 X-RAY EXAM CHEST 1 VIEW: CPT

## 2022-01-01 PROCEDURE — 85045 AUTOMATED RETICULOCYTE COUNT: CPT

## 2022-01-01 PROCEDURE — 99152 MOD SED SAME PHYS/QHP 5/>YRS: CPT

## 2022-01-01 PROCEDURE — 82607 VITAMIN B-12: CPT

## 2022-01-01 PROCEDURE — 83010 ASSAY OF HAPTOGLOBIN QUANT: CPT

## 2022-01-01 PROCEDURE — 70496 CT ANGIOGRAPHY HEAD: CPT | Mod: 26

## 2022-01-01 PROCEDURE — 97530 THERAPEUTIC ACTIVITIES: CPT

## 2022-01-01 PROCEDURE — P9037: CPT

## 2022-01-01 PROCEDURE — 84300 ASSAY OF URINE SODIUM: CPT

## 2022-01-01 PROCEDURE — 70496 CT ANGIOGRAPHY HEAD: CPT

## 2022-01-01 PROCEDURE — 80048 BASIC METABOLIC PNL TOTAL CA: CPT

## 2022-01-01 PROCEDURE — 74174 CTA ABD&PLVS W/CONTRAST: CPT

## 2022-01-01 PROCEDURE — 84156 ASSAY OF PROTEIN URINE: CPT

## 2022-01-01 PROCEDURE — 97161 PT EVAL LOW COMPLEX 20 MIN: CPT

## 2022-01-01 PROCEDURE — 86923 COMPATIBILITY TEST ELECTRIC: CPT

## 2022-01-01 PROCEDURE — 99232 SBSQ HOSP IP/OBS MODERATE 35: CPT | Mod: GC

## 2022-01-01 PROCEDURE — 99285 EMERGENCY DEPT VISIT HI MDM: CPT | Mod: 25

## 2022-01-01 PROCEDURE — 82962 GLUCOSE BLOOD TEST: CPT

## 2022-01-01 PROCEDURE — C9399: CPT

## 2022-01-01 PROCEDURE — 97167 OT EVAL HIGH COMPLEX 60 MIN: CPT

## 2022-01-01 PROCEDURE — 84484 ASSAY OF TROPONIN QUANT: CPT

## 2022-01-01 PROCEDURE — P9100: CPT

## 2022-01-01 PROCEDURE — 93970 EXTREMITY STUDY: CPT

## 2022-01-01 PROCEDURE — 84550 ASSAY OF BLOOD/URIC ACID: CPT

## 2022-01-01 PROCEDURE — 95714 VEEG EA 12-26 HR UNMNTR: CPT

## 2022-01-01 PROCEDURE — 95711 VEEG 2-12 HR UNMONITORED: CPT

## 2022-01-01 PROCEDURE — 83605 ASSAY OF LACTIC ACID: CPT

## 2022-01-01 PROCEDURE — 85730 THROMBOPLASTIN TIME PARTIAL: CPT

## 2022-01-01 PROCEDURE — 97110 THERAPEUTIC EXERCISES: CPT

## 2022-01-01 PROCEDURE — 86038 ANTINUCLEAR ANTIBODIES: CPT

## 2022-01-01 PROCEDURE — U0005: CPT

## 2022-01-01 PROCEDURE — 73630 X-RAY EXAM OF FOOT: CPT

## 2022-01-01 PROCEDURE — 93010 ELECTROCARDIOGRAM REPORT: CPT | Mod: NC

## 2022-01-01 PROCEDURE — 87040 BLOOD CULTURE FOR BACTERIA: CPT

## 2022-01-01 PROCEDURE — C1760: CPT

## 2022-01-01 PROCEDURE — 83880 ASSAY OF NATRIURETIC PEPTIDE: CPT

## 2022-01-01 PROCEDURE — 76770 US EXAM ABDO BACK WALL COMP: CPT

## 2022-01-01 PROCEDURE — 97112 NEUROMUSCULAR REEDUCATION: CPT

## 2022-01-01 PROCEDURE — 74176 CT ABD & PELVIS W/O CONTRAST: CPT | Mod: 26,MG

## 2022-01-01 PROCEDURE — 95720 EEG PHY/QHP EA INCR W/VEEG: CPT

## 2022-01-01 PROCEDURE — 82310 ASSAY OF CALCIUM: CPT

## 2022-01-01 PROCEDURE — 84443 ASSAY THYROID STIM HORMONE: CPT

## 2022-01-01 PROCEDURE — 85027 COMPLETE CBC AUTOMATED: CPT

## 2022-01-01 PROCEDURE — 31720 CLEARANCE OF AIRWAYS: CPT

## 2022-01-01 PROCEDURE — C1752: CPT

## 2022-01-01 PROCEDURE — 36430 TRANSFUSION BLD/BLD COMPNT: CPT

## 2022-01-01 PROCEDURE — 97162 PT EVAL MOD COMPLEX 30 MIN: CPT

## 2022-01-01 PROCEDURE — 76937 US GUIDE VASCULAR ACCESS: CPT | Mod: 26

## 2022-01-01 PROCEDURE — 86803 HEPATITIS C AB TEST: CPT

## 2022-01-01 PROCEDURE — C1874: CPT

## 2022-01-01 PROCEDURE — 70547 MR ANGIOGRAPHY NECK W/O DYE: CPT | Mod: 26

## 2022-01-01 PROCEDURE — 87635 SARS-COV-2 COVID-19 AMP PRB: CPT

## 2022-01-01 PROCEDURE — 93880 EXTRACRANIAL BILAT STUDY: CPT

## 2022-01-01 PROCEDURE — 83550 IRON BINDING TEST: CPT

## 2022-01-01 PROCEDURE — 94003 VENT MGMT INPAT SUBQ DAY: CPT

## 2022-01-01 DEVICE — SURGIFOAM PAD 8CM X 12.5CM X 10MM (100): Type: IMPLANTABLE DEVICE | Status: FUNCTIONAL

## 2022-01-01 DEVICE — BIOGLUE 10ML SYR: Type: IMPLANTABLE DEVICE | Status: FUNCTIONAL

## 2022-01-01 DEVICE — CLIP APPLIER COVIDIEN SURGICLIP III 9" SM: Type: IMPLANTABLE DEVICE | Status: FUNCTIONAL

## 2022-01-01 DEVICE — CLIP RESOLUTION 360 ULTRA 235CM 20/BX: Type: IMPLANTABLE DEVICE | Status: FUNCTIONAL

## 2022-01-01 DEVICE — CLIP RESOLUTION 360 235CM: Type: IMPLANTABLE DEVICE | Status: FUNCTIONAL

## 2022-01-01 DEVICE — GRAFT VASC PROPATEN 6MMX60X70CM TW REMOV RING: Type: IMPLANTABLE DEVICE | Status: FUNCTIONAL

## 2022-01-01 DEVICE — ARISTA 3GR: Type: IMPLANTABLE DEVICE | Status: FUNCTIONAL

## 2022-01-01 DEVICE — CLIP APPLIER ETHICON LIGACLIP 9 3/8" MEDIUM: Type: IMPLANTABLE DEVICE | Status: FUNCTIONAL

## 2022-01-01 RX ORDER — ACETAMINOPHEN 500 MG
650 TABLET ORAL ONCE
Refills: 0 | Status: COMPLETED | OUTPATIENT
Start: 2022-01-01 | End: 2022-01-01

## 2022-01-01 RX ORDER — INSULIN ASPART 100 [IU]/ML
10 INJECTION, SOLUTION SUBCUTANEOUS
Qty: 0 | Refills: 0 | DISCHARGE

## 2022-01-01 RX ORDER — HEPARIN SODIUM 5000 [USP'U]/ML
10000 INJECTION INTRAVENOUS; SUBCUTANEOUS EVERY 6 HOURS
Refills: 0 | Status: DISCONTINUED | OUTPATIENT
Start: 2022-01-01 | End: 2022-01-01

## 2022-01-01 RX ORDER — HEPARIN SODIUM 5000 [USP'U]/ML
2200 INJECTION INTRAVENOUS; SUBCUTANEOUS
Qty: 25000 | Refills: 0 | Status: DISCONTINUED | OUTPATIENT
Start: 2022-01-01 | End: 2022-01-01

## 2022-01-01 RX ORDER — FENTANYL CITRATE 50 UG/ML
50 INJECTION INTRAVENOUS
Refills: 0 | Status: DISCONTINUED | OUTPATIENT
Start: 2022-01-01 | End: 2022-01-01

## 2022-01-01 RX ORDER — DILTIAZEM HCL 120 MG
30 CAPSULE, EXT RELEASE 24 HR ORAL EVERY 6 HOURS
Refills: 0 | Status: DISCONTINUED | OUTPATIENT
Start: 2022-01-01 | End: 2022-01-01

## 2022-01-01 RX ORDER — METOPROLOL TARTRATE 50 MG
5 TABLET ORAL ONCE
Refills: 0 | Status: COMPLETED | OUTPATIENT
Start: 2022-01-01 | End: 2022-01-01

## 2022-01-01 RX ORDER — ERYTHROPOIETIN 10000 [IU]/ML
20000 INJECTION, SOLUTION INTRAVENOUS; SUBCUTANEOUS ONCE
Refills: 0 | Status: COMPLETED | OUTPATIENT
Start: 2022-01-01 | End: 2022-01-01

## 2022-01-01 RX ORDER — MAGNESIUM SULFATE 500 MG/ML
1 VIAL (ML) INJECTION ONCE
Refills: 0 | Status: COMPLETED | OUTPATIENT
Start: 2022-01-01 | End: 2022-01-01

## 2022-01-01 RX ORDER — METOPROLOL TARTRATE 50 MG
100 TABLET ORAL DAILY
Refills: 0 | Status: DISCONTINUED | OUTPATIENT
Start: 2022-01-01 | End: 2022-01-01

## 2022-01-01 RX ORDER — SEVELAMER CARBONATE 2400 MG/1
1600 POWDER, FOR SUSPENSION ORAL
Refills: 0 | Status: DISCONTINUED | OUTPATIENT
Start: 2022-01-01 | End: 2022-01-01

## 2022-01-01 RX ORDER — VALACYCLOVIR 500 MG/1
500 TABLET, FILM COATED ORAL THREE TIMES A DAY
Refills: 0 | Status: DISCONTINUED | OUTPATIENT
Start: 2022-01-01 | End: 2022-01-01

## 2022-01-01 RX ORDER — SENNA PLUS 8.6 MG/1
2 TABLET ORAL AT BEDTIME
Refills: 0 | Status: DISCONTINUED | OUTPATIENT
Start: 2022-01-01 | End: 2022-01-01

## 2022-01-01 RX ORDER — SODIUM CHLORIDE 9 MG/ML
1000 INJECTION, SOLUTION INTRAVENOUS
Refills: 0 | Status: DISCONTINUED | OUTPATIENT
Start: 2022-01-01 | End: 2022-01-01

## 2022-01-01 RX ORDER — ACETAMINOPHEN 500 MG
1000 TABLET ORAL ONCE
Refills: 0 | Status: COMPLETED | OUTPATIENT
Start: 2022-01-01 | End: 2022-01-01

## 2022-01-01 RX ORDER — INSULIN GLARGINE 100 [IU]/ML
25 INJECTION, SOLUTION SUBCUTANEOUS
Qty: 0 | Refills: 0 | DISCHARGE

## 2022-01-01 RX ORDER — DILTIAZEM HCL 120 MG
120 CAPSULE, EXT RELEASE 24 HR ORAL DAILY
Refills: 0 | Status: DISCONTINUED | OUTPATIENT
Start: 2022-01-01 | End: 2022-01-01

## 2022-01-01 RX ORDER — ATORVASTATIN CALCIUM 80 MG/1
1 TABLET, FILM COATED ORAL
Qty: 30 | Refills: 0
Start: 2022-01-01 | End: 2022-01-01

## 2022-01-01 RX ORDER — ERYTHROMYCIN BASE 5 MG/GRAM
1 OINTMENT (GRAM) OPHTHALMIC (EYE) AT BEDTIME
Refills: 0 | Status: DISCONTINUED | OUTPATIENT
Start: 2022-01-01 | End: 2022-01-01

## 2022-01-01 RX ORDER — SODIUM CHLORIDE 9 MG/ML
1000 INJECTION INTRAMUSCULAR; INTRAVENOUS; SUBCUTANEOUS
Refills: 0 | Status: DISCONTINUED | OUTPATIENT
Start: 2022-01-01 | End: 2022-01-01

## 2022-01-01 RX ORDER — INSULIN GLARGINE 100 [IU]/ML
29 INJECTION, SOLUTION SUBCUTANEOUS
Qty: 0 | Refills: 0 | DISCHARGE

## 2022-01-01 RX ORDER — POLYETHYLENE GLYCOL 3350 17 G/17G
17 POWDER, FOR SOLUTION ORAL
Refills: 0 | Status: DISCONTINUED | OUTPATIENT
Start: 2022-01-01 | End: 2022-01-01

## 2022-01-01 RX ORDER — HYDROMORPHONE HYDROCHLORIDE 2 MG/ML
1 INJECTION INTRAMUSCULAR; INTRAVENOUS; SUBCUTANEOUS
Refills: 0 | Status: DISCONTINUED | OUTPATIENT
Start: 2022-01-01 | End: 2022-01-01

## 2022-01-01 RX ORDER — HEPARIN SODIUM 5000 [USP'U]/ML
INJECTION INTRAVENOUS; SUBCUTANEOUS
Qty: 25000 | Refills: 0 | Status: DISCONTINUED | OUTPATIENT
Start: 2022-01-01 | End: 2022-01-01

## 2022-01-01 RX ORDER — INSULIN LISPRO 100/ML
VIAL (ML) SUBCUTANEOUS EVERY 6 HOURS
Refills: 0 | Status: DISCONTINUED | OUTPATIENT
Start: 2022-01-01 | End: 2022-01-01

## 2022-01-01 RX ORDER — OFLOXACIN 0.3 %
1 DROPS OPHTHALMIC (EYE)
Refills: 0 | Status: DISCONTINUED | OUTPATIENT
Start: 2022-01-01 | End: 2022-01-01

## 2022-01-01 RX ORDER — CHLORHEXIDINE GLUCONATE 213 G/1000ML
1 SOLUTION TOPICAL
Refills: 0 | Status: DISCONTINUED | OUTPATIENT
Start: 2022-01-01 | End: 2022-01-01

## 2022-01-01 RX ORDER — ACETAMINOPHEN 500 MG
1000 TABLET ORAL ONCE
Refills: 0 | Status: DISCONTINUED | OUTPATIENT
Start: 2022-01-01 | End: 2022-01-01

## 2022-01-01 RX ORDER — DESMOPRESSIN ACETATE 0.1 MG/1
0.2 TABLET ORAL ONCE
Refills: 0 | Status: DISCONTINUED | OUTPATIENT
Start: 2022-01-01 | End: 2022-01-01

## 2022-01-01 RX ORDER — METOCLOPRAMIDE HCL 10 MG
5 TABLET ORAL ONCE
Refills: 0 | Status: COMPLETED | OUTPATIENT
Start: 2022-01-01 | End: 2022-01-01

## 2022-01-01 RX ORDER — ATORVASTATIN CALCIUM 80 MG/1
40 TABLET, FILM COATED ORAL AT BEDTIME
Refills: 0 | Status: DISCONTINUED | OUTPATIENT
Start: 2022-01-01 | End: 2022-01-01

## 2022-01-01 RX ORDER — HEPARIN SODIUM 5000 [USP'U]/ML
5000 INJECTION INTRAVENOUS; SUBCUTANEOUS EVERY 6 HOURS
Refills: 0 | Status: DISCONTINUED | OUTPATIENT
Start: 2022-01-01 | End: 2022-01-01

## 2022-01-01 RX ORDER — METOPROLOL TARTRATE 50 MG
2.5 TABLET ORAL ONCE
Refills: 0 | Status: COMPLETED | OUTPATIENT
Start: 2022-01-01 | End: 2022-01-01

## 2022-01-01 RX ORDER — HYDRALAZINE HCL 50 MG
10 TABLET ORAL ONCE
Refills: 0 | Status: COMPLETED | OUTPATIENT
Start: 2022-01-01 | End: 2022-01-01

## 2022-01-01 RX ORDER — HYDROMORPHONE HYDROCHLORIDE 2 MG/ML
1 INJECTION INTRAMUSCULAR; INTRAVENOUS; SUBCUTANEOUS ONCE
Refills: 0 | Status: DISCONTINUED | OUTPATIENT
Start: 2022-01-01 | End: 2022-01-01

## 2022-01-01 RX ORDER — IRON SUCROSE 20 MG/ML
100 INJECTION, SOLUTION INTRAVENOUS ONCE
Refills: 0 | Status: COMPLETED | OUTPATIENT
Start: 2022-01-01 | End: 2022-01-01

## 2022-01-01 RX ORDER — ASPIRIN/CALCIUM CARB/MAGNESIUM 324 MG
81 TABLET ORAL DAILY
Refills: 0 | Status: DISCONTINUED | OUTPATIENT
Start: 2022-01-01 | End: 2022-01-01

## 2022-01-01 RX ORDER — ERYTHROPOIETIN 10000 [IU]/ML
10000 INJECTION, SOLUTION INTRAVENOUS; SUBCUTANEOUS ONCE
Refills: 0 | Status: COMPLETED | OUTPATIENT
Start: 2022-01-01 | End: 2022-01-01

## 2022-01-01 RX ORDER — CHLORHEXIDINE GLUCONATE 213 G/1000ML
1 SOLUTION TOPICAL DAILY
Refills: 0 | Status: DISCONTINUED | OUTPATIENT
Start: 2022-01-01 | End: 2022-01-01

## 2022-01-01 RX ORDER — DEXMEDETOMIDINE HYDROCHLORIDE IN 0.9% SODIUM CHLORIDE 4 UG/ML
0.3 INJECTION INTRAVENOUS
Qty: 200 | Refills: 0 | Status: DISCONTINUED | OUTPATIENT
Start: 2022-01-01 | End: 2022-01-01

## 2022-01-01 RX ORDER — CLOPIDOGREL BISULFATE 75 MG/1
600 TABLET, FILM COATED ORAL ONCE
Refills: 0 | Status: COMPLETED | OUTPATIENT
Start: 2022-01-01 | End: 2022-01-01

## 2022-01-01 RX ORDER — DILTIAZEM HCL 120 MG
240 CAPSULE, EXT RELEASE 24 HR ORAL DAILY
Refills: 0 | Status: DISCONTINUED | OUTPATIENT
Start: 2022-01-01 | End: 2022-01-01

## 2022-01-01 RX ORDER — POTASSIUM CHLORIDE 20 MEQ
40 PACKET (EA) ORAL ONCE
Refills: 0 | Status: COMPLETED | OUTPATIENT
Start: 2022-01-01 | End: 2022-01-01

## 2022-01-01 RX ORDER — OXYCODONE AND ACETAMINOPHEN 5; 325 MG/1; MG/1
1 TABLET ORAL EVERY 6 HOURS
Refills: 0 | Status: DISCONTINUED | OUTPATIENT
Start: 2022-01-01 | End: 2022-01-01

## 2022-01-01 RX ORDER — HEPARIN SODIUM 5000 [USP'U]/ML
4200 INJECTION INTRAVENOUS; SUBCUTANEOUS
Qty: 25000 | Refills: 0 | Status: DISCONTINUED | OUTPATIENT
Start: 2022-01-01 | End: 2022-01-01

## 2022-01-01 RX ORDER — SODIUM,POTASSIUM PHOSPHATES 278-250MG
1 POWDER IN PACKET (EA) ORAL
Refills: 0 | Status: DISCONTINUED | OUTPATIENT
Start: 2022-01-01 | End: 2022-01-01

## 2022-01-01 RX ORDER — APIXABAN 2.5 MG/1
5 TABLET, FILM COATED ORAL
Refills: 0 | Status: DISCONTINUED | OUTPATIENT
Start: 2022-01-01 | End: 2022-01-01

## 2022-01-01 RX ORDER — OXYCODONE AND ACETAMINOPHEN 5; 325 MG/1; MG/1
1 TABLET ORAL EVERY 12 HOURS
Refills: 0 | Status: DISCONTINUED | OUTPATIENT
Start: 2022-01-01 | End: 2022-01-01

## 2022-01-01 RX ORDER — DILTIAZEM HCL 120 MG
5 CAPSULE, EXT RELEASE 24 HR ORAL
Qty: 125 | Refills: 0 | Status: DISCONTINUED | OUTPATIENT
Start: 2022-01-01 | End: 2022-01-01

## 2022-01-01 RX ORDER — PHENYLEPHRINE HYDROCHLORIDE 10 MG/ML
0.1 INJECTION INTRAVENOUS
Qty: 40 | Refills: 0 | Status: DISCONTINUED | OUTPATIENT
Start: 2022-01-01 | End: 2022-01-01

## 2022-01-01 RX ORDER — QUETIAPINE FUMARATE 200 MG/1
25 TABLET, FILM COATED ORAL AT BEDTIME
Refills: 0 | Status: DISCONTINUED | OUTPATIENT
Start: 2022-01-01 | End: 2022-01-01

## 2022-01-01 RX ORDER — ALLOPURINOL 300 MG
100 TABLET ORAL DAILY
Refills: 0 | Status: DISCONTINUED | OUTPATIENT
Start: 2022-01-01 | End: 2022-01-01

## 2022-01-01 RX ORDER — ONDANSETRON 8 MG/1
4 TABLET, FILM COATED ORAL ONCE
Refills: 0 | Status: COMPLETED | OUTPATIENT
Start: 2022-01-01 | End: 2022-01-01

## 2022-01-01 RX ORDER — SODIUM CHLORIDE 9 MG/ML
1000 INJECTION INTRAMUSCULAR; INTRAVENOUS; SUBCUTANEOUS ONCE
Refills: 0 | Status: COMPLETED | OUTPATIENT
Start: 2022-01-01 | End: 2022-01-01

## 2022-01-01 RX ORDER — DEXTROSE 50 % IN WATER 50 %
15 SYRINGE (ML) INTRAVENOUS ONCE
Refills: 0 | Status: DISCONTINUED | OUTPATIENT
Start: 2022-01-01 | End: 2022-01-01

## 2022-01-01 RX ORDER — DILTIAZEM HCL 120 MG
5 CAPSULE, EXT RELEASE 24 HR ORAL ONCE
Refills: 0 | Status: DISCONTINUED | OUTPATIENT
Start: 2022-01-01 | End: 2022-01-01

## 2022-01-01 RX ORDER — INSULIN LISPRO 100/ML
10 VIAL (ML) SUBCUTANEOUS ONCE
Refills: 0 | Status: COMPLETED | OUTPATIENT
Start: 2022-01-01 | End: 2022-01-01

## 2022-01-01 RX ORDER — SODIUM CHLORIDE 9 MG/ML
1000 INJECTION INTRAMUSCULAR; INTRAVENOUS; SUBCUTANEOUS
Refills: 0 | Status: COMPLETED | OUTPATIENT
Start: 2022-01-01 | End: 2022-01-01

## 2022-01-01 RX ORDER — ACETAMINOPHEN 500 MG
650 TABLET ORAL EVERY 6 HOURS
Refills: 0 | Status: DISCONTINUED | OUTPATIENT
Start: 2022-01-01 | End: 2022-01-01

## 2022-01-01 RX ORDER — POVIDONE-IODINE 5 %
1 AEROSOL (ML) TOPICAL DAILY
Refills: 0 | Status: DISCONTINUED | OUTPATIENT
Start: 2022-01-01 | End: 2022-01-01

## 2022-01-01 RX ORDER — METOPROLOL TARTRATE 50 MG
50 TABLET ORAL
Refills: 0 | Status: DISCONTINUED | OUTPATIENT
Start: 2022-01-01 | End: 2022-01-01

## 2022-01-01 RX ORDER — INSULIN GLARGINE 100 [IU]/ML
28 INJECTION, SOLUTION SUBCUTANEOUS
Qty: 1 | Refills: 0
Start: 2022-01-01 | End: 2022-01-01

## 2022-01-01 RX ORDER — DILTIAZEM HCL 120 MG
180 CAPSULE, EXT RELEASE 24 HR ORAL DAILY
Refills: 0 | Status: DISCONTINUED | OUTPATIENT
Start: 2022-01-01 | End: 2022-01-01

## 2022-01-01 RX ORDER — APIXABAN 2.5 MG/1
5 TABLET, FILM COATED ORAL ONCE
Refills: 0 | Status: COMPLETED | OUTPATIENT
Start: 2022-01-01 | End: 2022-01-01

## 2022-01-01 RX ORDER — MIDAZOLAM HYDROCHLORIDE 1 MG/ML
5 INJECTION, SOLUTION INTRAMUSCULAR; INTRAVENOUS
Refills: 0 | Status: DISCONTINUED | OUTPATIENT
Start: 2022-01-01 | End: 2022-01-01

## 2022-01-01 RX ORDER — VALACYCLOVIR 500 MG/1
500 TABLET, FILM COATED ORAL DAILY
Refills: 0 | Status: DISCONTINUED | OUTPATIENT
Start: 2022-01-01 | End: 2022-01-01

## 2022-01-01 RX ORDER — DEXTROSE 50 % IN WATER 50 %
25 SYRINGE (ML) INTRAVENOUS ONCE
Refills: 0 | Status: DISCONTINUED | OUTPATIENT
Start: 2022-01-01 | End: 2022-01-01

## 2022-01-01 RX ORDER — FENTANYL CITRATE 50 UG/ML
25 INJECTION INTRAVENOUS
Refills: 0 | Status: DISCONTINUED | OUTPATIENT
Start: 2022-01-01 | End: 2022-01-01

## 2022-01-01 RX ORDER — INSULIN LISPRO 100/ML
VIAL (ML) SUBCUTANEOUS
Refills: 0 | Status: DISCONTINUED | OUTPATIENT
Start: 2022-01-01 | End: 2022-01-01

## 2022-01-01 RX ORDER — HUMAN INSULIN 100 [IU]/ML
8 INJECTION, SUSPENSION SUBCUTANEOUS EVERY 6 HOURS
Refills: 0 | Status: DISCONTINUED | OUTPATIENT
Start: 2022-01-01 | End: 2022-01-01

## 2022-01-01 RX ORDER — LOSARTAN POTASSIUM 100 MG/1
25 TABLET, FILM COATED ORAL DAILY
Refills: 0 | Status: DISCONTINUED | OUTPATIENT
Start: 2022-01-01 | End: 2022-01-01

## 2022-01-01 RX ORDER — INSULIN LISPRO 100/ML
18 VIAL (ML) SUBCUTANEOUS
Refills: 0 | Status: DISCONTINUED | OUTPATIENT
Start: 2022-01-01 | End: 2022-01-01

## 2022-01-01 RX ORDER — FUROSEMIDE 40 MG
20 TABLET ORAL ONCE
Refills: 0 | Status: DISCONTINUED | OUTPATIENT
Start: 2022-01-01 | End: 2022-01-01

## 2022-01-01 RX ORDER — DEXMEDETOMIDINE HYDROCHLORIDE IN 0.9% SODIUM CHLORIDE 4 UG/ML
0.01 INJECTION INTRAVENOUS
Qty: 400 | Refills: 0 | Status: DISCONTINUED | OUTPATIENT
Start: 2022-01-01 | End: 2022-01-01

## 2022-01-01 RX ORDER — DILTIAZEM HCL 120 MG
10 CAPSULE, EXT RELEASE 24 HR ORAL ONCE
Refills: 0 | Status: COMPLETED | OUTPATIENT
Start: 2022-01-01 | End: 2022-01-01

## 2022-01-01 RX ORDER — PANTOPRAZOLE SODIUM 20 MG/1
40 TABLET, DELAYED RELEASE ORAL DAILY
Refills: 0 | Status: DISCONTINUED | OUTPATIENT
Start: 2022-01-01 | End: 2022-01-01

## 2022-01-01 RX ORDER — DILTIAZEM HCL 120 MG
10 CAPSULE, EXT RELEASE 24 HR ORAL
Qty: 125 | Refills: 0 | Status: DISCONTINUED | OUTPATIENT
Start: 2022-01-01 | End: 2022-01-01

## 2022-01-01 RX ORDER — HUMAN INSULIN 100 [IU]/ML
4 INJECTION, SUSPENSION SUBCUTANEOUS EVERY 6 HOURS
Refills: 0 | Status: DISCONTINUED | OUTPATIENT
Start: 2022-01-01 | End: 2022-01-01

## 2022-01-01 RX ORDER — SODIUM ZIRCONIUM CYCLOSILICATE 10 G/10G
10 POWDER, FOR SUSPENSION ORAL ONCE
Refills: 0 | Status: COMPLETED | OUTPATIENT
Start: 2022-01-01 | End: 2022-01-01

## 2022-01-01 RX ORDER — PHENYLEPHRINE HYDROCHLORIDE 10 MG/ML
1.9 INJECTION INTRAVENOUS
Qty: 160 | Refills: 0 | Status: DISCONTINUED | OUTPATIENT
Start: 2022-01-01 | End: 2022-01-01

## 2022-01-01 RX ORDER — SODIUM,POTASSIUM PHOSPHATES 278-250MG
1 POWDER IN PACKET (EA) ORAL
Refills: 0 | Status: COMPLETED | OUTPATIENT
Start: 2022-01-01 | End: 2022-01-01

## 2022-01-01 RX ORDER — DEXMEDETOMIDINE HYDROCHLORIDE IN 0.9% SODIUM CHLORIDE 4 UG/ML
0.2 INJECTION INTRAVENOUS
Qty: 200 | Refills: 0 | Status: DISCONTINUED | OUTPATIENT
Start: 2022-01-01 | End: 2022-01-01

## 2022-01-01 RX ORDER — LIDOCAINE 4 G/100G
1 CREAM TOPICAL DAILY
Refills: 0 | Status: DISCONTINUED | OUTPATIENT
Start: 2022-01-01 | End: 2022-01-01

## 2022-01-01 RX ORDER — TRIFLURIDINE 1 %
1 DROPS OPHTHALMIC (EYE)
Refills: 0 | Status: DISCONTINUED | OUTPATIENT
Start: 2022-01-01 | End: 2022-01-01

## 2022-01-01 RX ORDER — PHYTONADIONE (VIT K1) 5 MG
10 TABLET ORAL ONCE
Refills: 0 | Status: DISCONTINUED | OUTPATIENT
Start: 2022-01-01 | End: 2022-01-01

## 2022-01-01 RX ORDER — TRIFLURIDINE 1 %
1 DROPS OPHTHALMIC (EYE)
Refills: 0 | Status: COMPLETED | OUTPATIENT
Start: 2022-01-01 | End: 2022-01-01

## 2022-01-01 RX ORDER — HEPARIN SODIUM 5000 [USP'U]/ML
3900 INJECTION INTRAVENOUS; SUBCUTANEOUS
Qty: 25000 | Refills: 0 | Status: DISCONTINUED | OUTPATIENT
Start: 2022-01-01 | End: 2022-01-01

## 2022-01-01 RX ORDER — HUMAN INSULIN 100 [IU]/ML
10 INJECTION, SUSPENSION SUBCUTANEOUS EVERY 6 HOURS
Refills: 0 | Status: DISCONTINUED | OUTPATIENT
Start: 2022-01-01 | End: 2022-01-01

## 2022-01-01 RX ORDER — ERYTHROMYCIN BASE 5 MG/GRAM
1 OINTMENT (GRAM) OPHTHALMIC (EYE)
Refills: 0 | Status: DISCONTINUED | OUTPATIENT
Start: 2022-01-01 | End: 2022-01-01

## 2022-01-01 RX ORDER — VASOPRESSIN 20 [USP'U]/ML
0.02 INJECTION INTRAVENOUS
Qty: 50 | Refills: 0 | Status: DISCONTINUED | OUTPATIENT
Start: 2022-01-01 | End: 2022-01-01

## 2022-01-01 RX ORDER — METOPROLOL TARTRATE 50 MG
25 TABLET ORAL EVERY 6 HOURS
Refills: 0 | Status: DISCONTINUED | OUTPATIENT
Start: 2022-01-01 | End: 2022-01-01

## 2022-01-01 RX ORDER — HYDRALAZINE HCL 50 MG
25 TABLET ORAL THREE TIMES A DAY
Refills: 0 | Status: DISCONTINUED | OUTPATIENT
Start: 2022-01-01 | End: 2022-01-01

## 2022-01-01 RX ORDER — INSULIN GLARGINE 100 [IU]/ML
28 INJECTION, SOLUTION SUBCUTANEOUS AT BEDTIME
Refills: 0 | Status: DISCONTINUED | OUTPATIENT
Start: 2022-01-01 | End: 2022-01-01

## 2022-01-01 RX ORDER — SUCRALFATE 1 G
1 TABLET ORAL
Refills: 0 | Status: DISCONTINUED | OUTPATIENT
Start: 2022-01-01 | End: 2022-01-01

## 2022-01-01 RX ORDER — DILTIAZEM HCL 120 MG
5 CAPSULE, EXT RELEASE 24 HR ORAL ONCE
Refills: 0 | Status: COMPLETED | OUTPATIENT
Start: 2022-01-01 | End: 2022-01-01

## 2022-01-01 RX ORDER — CHLORHEXIDINE GLUCONATE 213 G/1000ML
15 SOLUTION TOPICAL EVERY 12 HOURS
Refills: 0 | Status: DISCONTINUED | OUTPATIENT
Start: 2022-01-01 | End: 2022-01-01

## 2022-01-01 RX ORDER — INSULIN LISPRO 100/ML
20 VIAL (ML) SUBCUTANEOUS
Refills: 0 | Status: DISCONTINUED | OUTPATIENT
Start: 2022-01-01 | End: 2022-01-01

## 2022-01-01 RX ORDER — DILTIAZEM HCL 120 MG
60 CAPSULE, EXT RELEASE 24 HR ORAL EVERY 6 HOURS
Refills: 0 | Status: DISCONTINUED | OUTPATIENT
Start: 2022-01-01 | End: 2022-01-01

## 2022-01-01 RX ORDER — CEFTRIAXONE 500 MG/1
1000 INJECTION, POWDER, FOR SOLUTION INTRAMUSCULAR; INTRAVENOUS EVERY 24 HOURS
Refills: 0 | Status: COMPLETED | OUTPATIENT
Start: 2022-01-01 | End: 2022-01-01

## 2022-01-01 RX ORDER — SODIUM CHLORIDE 9 MG/ML
10 INJECTION INTRAMUSCULAR; INTRAVENOUS; SUBCUTANEOUS
Refills: 0 | Status: DISCONTINUED | OUTPATIENT
Start: 2022-01-01 | End: 2022-01-01

## 2022-01-01 RX ORDER — LACTULOSE 10 G/15ML
20 SOLUTION ORAL ONCE
Refills: 0 | Status: COMPLETED | OUTPATIENT
Start: 2022-01-01 | End: 2022-01-01

## 2022-01-01 RX ORDER — VANCOMYCIN HCL 1 G
1500 VIAL (EA) INTRAVENOUS ONCE
Refills: 0 | Status: COMPLETED | OUTPATIENT
Start: 2022-01-01 | End: 2022-01-01

## 2022-01-01 RX ORDER — METOPROLOL TARTRATE 50 MG
12.5 TABLET ORAL EVERY 12 HOURS
Refills: 0 | Status: DISCONTINUED | OUTPATIENT
Start: 2022-01-01 | End: 2022-01-01

## 2022-01-01 RX ORDER — BUMETANIDE 0.25 MG/ML
4 INJECTION INTRAMUSCULAR; INTRAVENOUS ONCE
Refills: 0 | Status: COMPLETED | OUTPATIENT
Start: 2022-01-01 | End: 2022-01-01

## 2022-01-01 RX ORDER — HEPARIN SODIUM 5000 [USP'U]/ML
2700 INJECTION INTRAVENOUS; SUBCUTANEOUS
Qty: 25000 | Refills: 0 | Status: DISCONTINUED | OUTPATIENT
Start: 2022-01-01 | End: 2022-01-01

## 2022-01-01 RX ORDER — HEPARIN SODIUM 5000 [USP'U]/ML
3000 INJECTION INTRAVENOUS; SUBCUTANEOUS
Qty: 25000 | Refills: 0 | Status: DISCONTINUED | OUTPATIENT
Start: 2022-01-01 | End: 2022-01-01

## 2022-01-01 RX ORDER — ERYTHROPOIETIN 10000 [IU]/ML
10000 INJECTION, SOLUTION INTRAVENOUS; SUBCUTANEOUS ONCE
Refills: 0 | Status: DISCONTINUED | OUTPATIENT
Start: 2022-01-01 | End: 2022-01-01

## 2022-01-01 RX ORDER — MIDAZOLAM HYDROCHLORIDE 1 MG/ML
2 INJECTION, SOLUTION INTRAMUSCULAR; INTRAVENOUS ONCE
Refills: 0 | Status: DISCONTINUED | OUTPATIENT
Start: 2022-01-01 | End: 2022-01-01

## 2022-01-01 RX ORDER — BUMETANIDE 0.25 MG/ML
2 INJECTION INTRAMUSCULAR; INTRAVENOUS ONCE
Refills: 0 | Status: DISCONTINUED | OUTPATIENT
Start: 2022-01-01 | End: 2022-01-01

## 2022-01-01 RX ORDER — ALBUTEROL 90 UG/1
2 AEROSOL, METERED ORAL EVERY 6 HOURS
Refills: 0 | Status: DISCONTINUED | OUTPATIENT
Start: 2022-01-01 | End: 2022-01-01

## 2022-01-01 RX ORDER — HYDROMORPHONE HYDROCHLORIDE 2 MG/ML
0.5 INJECTION INTRAMUSCULAR; INTRAVENOUS; SUBCUTANEOUS ONCE
Refills: 0 | Status: DISCONTINUED | OUTPATIENT
Start: 2022-01-01 | End: 2022-01-01

## 2022-01-01 RX ORDER — BUMETANIDE 0.25 MG/ML
2 INJECTION INTRAMUSCULAR; INTRAVENOUS DAILY
Refills: 0 | Status: DISCONTINUED | OUTPATIENT
Start: 2022-01-01 | End: 2022-01-01

## 2022-01-01 RX ORDER — SODIUM CHLORIDE 9 MG/ML
250 INJECTION INTRAMUSCULAR; INTRAVENOUS; SUBCUTANEOUS
Refills: 0 | Status: DISCONTINUED | OUTPATIENT
Start: 2022-01-01 | End: 2022-01-01

## 2022-01-01 RX ORDER — PANTOPRAZOLE SODIUM 20 MG/1
40 TABLET, DELAYED RELEASE ORAL EVERY 12 HOURS
Refills: 0 | Status: DISCONTINUED | OUTPATIENT
Start: 2022-01-01 | End: 2022-01-01

## 2022-01-01 RX ORDER — HUMAN INSULIN 100 [IU]/ML
2 INJECTION, SUSPENSION SUBCUTANEOUS EVERY 6 HOURS
Refills: 0 | Status: DISCONTINUED | OUTPATIENT
Start: 2022-01-01 | End: 2022-01-01

## 2022-01-01 RX ORDER — METOPROLOL TARTRATE 50 MG
12.5 TABLET ORAL EVERY 6 HOURS
Refills: 0 | Status: DISCONTINUED | OUTPATIENT
Start: 2022-01-01 | End: 2022-01-01

## 2022-01-01 RX ORDER — ASPIRIN/CALCIUM CARB/MAGNESIUM 324 MG
324 TABLET ORAL ONCE
Refills: 0 | Status: COMPLETED | OUTPATIENT
Start: 2022-01-01 | End: 2022-01-01

## 2022-01-01 RX ORDER — SEVELAMER CARBONATE 2400 MG/1
800 POWDER, FOR SUSPENSION ORAL
Refills: 0 | Status: DISCONTINUED | OUTPATIENT
Start: 2022-01-01 | End: 2022-01-01

## 2022-01-01 RX ORDER — CALCIUM GLUCONATE 100 MG/ML
2 VIAL (ML) INTRAVENOUS ONCE
Refills: 0 | Status: COMPLETED | OUTPATIENT
Start: 2022-01-01 | End: 2022-01-01

## 2022-01-01 RX ORDER — MUPIROCIN 20 MG/G
1 OINTMENT TOPICAL
Refills: 0 | Status: COMPLETED | OUTPATIENT
Start: 2022-01-01 | End: 2022-01-01

## 2022-01-01 RX ORDER — PIPERACILLIN AND TAZOBACTAM 4; .5 G/20ML; G/20ML
3.38 INJECTION, POWDER, LYOPHILIZED, FOR SOLUTION INTRAVENOUS ONCE
Refills: 0 | Status: COMPLETED | OUTPATIENT
Start: 2022-01-01 | End: 2022-01-01

## 2022-01-01 RX ORDER — CALCIUM GLUCONATE 100 MG/ML
1 VIAL (ML) INTRAVENOUS ONCE
Refills: 0 | Status: COMPLETED | OUTPATIENT
Start: 2022-01-01 | End: 2022-01-01

## 2022-01-01 RX ORDER — INSULIN LISPRO 100/ML
10 VIAL (ML) SUBCUTANEOUS
Refills: 0 | Status: DISCONTINUED | OUTPATIENT
Start: 2022-01-01 | End: 2022-01-01

## 2022-01-01 RX ORDER — DABIGATRAN ETEXILATE MESYLATE 150 MG/1
150 CAPSULE ORAL EVERY 12 HOURS
Refills: 0 | Status: DISCONTINUED | OUTPATIENT
Start: 2022-01-01 | End: 2022-01-01

## 2022-01-01 RX ORDER — MAGNESIUM SULFATE 500 MG/ML
2 VIAL (ML) INJECTION ONCE
Refills: 0 | Status: COMPLETED | OUTPATIENT
Start: 2022-01-01 | End: 2022-01-01

## 2022-01-01 RX ORDER — POTASSIUM CHLORIDE 20 MEQ
20 PACKET (EA) ORAL
Refills: 0 | Status: COMPLETED | OUTPATIENT
Start: 2022-01-01 | End: 2022-01-01

## 2022-01-01 RX ORDER — LORATADINE 10 MG/1
10 TABLET ORAL DAILY
Refills: 0 | Status: DISCONTINUED | OUTPATIENT
Start: 2022-01-01 | End: 2022-01-01

## 2022-01-01 RX ORDER — MANNITOL
12.5 POWDER (GRAM) MISCELLANEOUS ONCE
Refills: 0 | Status: DISCONTINUED | OUTPATIENT
Start: 2022-01-01 | End: 2022-01-01

## 2022-01-01 RX ORDER — OFLOXACIN 0.3 %
1 DROPS OPHTHALMIC (EYE)
Refills: 0 | Status: ACTIVE | OUTPATIENT
Start: 2022-01-01 | End: 2022-01-01

## 2022-01-01 RX ORDER — INSULIN GLARGINE 100 [IU]/ML
25 INJECTION, SOLUTION SUBCUTANEOUS AT BEDTIME
Refills: 0 | Status: DISCONTINUED | OUTPATIENT
Start: 2022-01-01 | End: 2022-01-01

## 2022-01-01 RX ORDER — HYDRALAZINE HCL 50 MG
10 TABLET ORAL THREE TIMES A DAY
Refills: 0 | Status: DISCONTINUED | OUTPATIENT
Start: 2022-01-01 | End: 2022-01-01

## 2022-01-01 RX ORDER — SODIUM CHLORIDE 9 MG/ML
4 INJECTION INTRAMUSCULAR; INTRAVENOUS; SUBCUTANEOUS EVERY 12 HOURS
Refills: 0 | Status: COMPLETED | OUTPATIENT
Start: 2022-01-01 | End: 2022-01-01

## 2022-01-01 RX ORDER — METOPROLOL TARTRATE 50 MG
7.5 TABLET ORAL EVERY 6 HOURS
Refills: 0 | Status: DISCONTINUED | OUTPATIENT
Start: 2022-01-01 | End: 2022-01-01

## 2022-01-01 RX ORDER — INSULIN LISPRO 100/ML
22 VIAL (ML) SUBCUTANEOUS
Refills: 0 | Status: DISCONTINUED | OUTPATIENT
Start: 2022-01-01 | End: 2022-01-01

## 2022-01-01 RX ORDER — DEXTROSE 50 % IN WATER 50 %
12.5 SYRINGE (ML) INTRAVENOUS ONCE
Refills: 0 | Status: DISCONTINUED | OUTPATIENT
Start: 2022-01-01 | End: 2022-01-01

## 2022-01-01 RX ORDER — INSULIN LISPRO 100/ML
VIAL (ML) SUBCUTANEOUS AT BEDTIME
Refills: 0 | Status: DISCONTINUED | OUTPATIENT
Start: 2022-01-01 | End: 2022-01-01

## 2022-01-01 RX ORDER — COLLAGENASE CLOSTRIDIUM HIST. 250 UNIT/G
1 OINTMENT (GRAM) TOPICAL DAILY
Refills: 0 | Status: DISCONTINUED | OUTPATIENT
Start: 2022-01-01 | End: 2022-01-01

## 2022-01-01 RX ORDER — PHENYLEPHRINE HYDROCHLORIDE 10 MG/ML
0.5 INJECTION INTRAVENOUS
Qty: 40 | Refills: 0 | Status: DISCONTINUED | OUTPATIENT
Start: 2022-01-01 | End: 2022-01-01

## 2022-01-01 RX ORDER — CLOPIDOGREL BISULFATE 75 MG/1
75 TABLET, FILM COATED ORAL DAILY
Refills: 0 | Status: DISCONTINUED | OUTPATIENT
Start: 2022-01-01 | End: 2022-01-01

## 2022-01-01 RX ORDER — MORPHINE SULFATE 50 MG/1
2 CAPSULE, EXTENDED RELEASE ORAL
Refills: 0 | Status: DISCONTINUED | OUTPATIENT
Start: 2022-01-01 | End: 2022-01-01

## 2022-01-01 RX ORDER — POTASSIUM PHOSPHATE, MONOBASIC POTASSIUM PHOSPHATE, DIBASIC 236; 224 MG/ML; MG/ML
15 INJECTION, SOLUTION INTRAVENOUS ONCE
Refills: 0 | Status: COMPLETED | OUTPATIENT
Start: 2022-01-01 | End: 2022-01-01

## 2022-01-01 RX ORDER — COLLAGENASE CLOSTRIDIUM HIST. 250 UNIT/G
1 OINTMENT (GRAM) TOPICAL
Qty: 1 | Refills: 0
Start: 2022-01-01 | End: 2022-01-01

## 2022-01-01 RX ORDER — METOPROLOL TARTRATE 50 MG
50 TABLET ORAL EVERY 8 HOURS
Refills: 0 | Status: DISCONTINUED | OUTPATIENT
Start: 2022-01-01 | End: 2022-01-01

## 2022-01-01 RX ORDER — APIXABAN 2.5 MG/1
2.5 TABLET, FILM COATED ORAL
Refills: 0 | Status: DISCONTINUED | OUTPATIENT
Start: 2022-01-01 | End: 2022-01-01

## 2022-01-01 RX ORDER — SUCRALFATE 1 G
1 TABLET ORAL EVERY 12 HOURS
Refills: 0 | Status: DISCONTINUED | OUTPATIENT
Start: 2022-01-01 | End: 2022-01-01

## 2022-01-01 RX ORDER — SODIUM CHLORIDE 9 MG/ML
250 INJECTION INTRAMUSCULAR; INTRAVENOUS; SUBCUTANEOUS ONCE
Refills: 0 | Status: COMPLETED | OUTPATIENT
Start: 2022-01-01 | End: 2022-01-01

## 2022-01-01 RX ORDER — DILTIAZEM HCL 120 MG
30 CAPSULE, EXT RELEASE 24 HR ORAL ONCE
Refills: 0 | Status: COMPLETED | OUTPATIENT
Start: 2022-01-01 | End: 2022-01-01

## 2022-01-01 RX ORDER — METOPROLOL TARTRATE 50 MG
5 TABLET ORAL EVERY 6 HOURS
Refills: 0 | Status: DISCONTINUED | OUTPATIENT
Start: 2022-01-01 | End: 2022-01-01

## 2022-01-01 RX ORDER — ALLOPURINOL 300 MG
2 TABLET ORAL
Qty: 0 | Refills: 0 | DISCHARGE

## 2022-01-01 RX ORDER — SODIUM CHLORIDE 9 MG/ML
1000 INJECTION, SOLUTION INTRAVENOUS ONCE
Refills: 0 | Status: DISCONTINUED | OUTPATIENT
Start: 2022-01-01 | End: 2022-01-01

## 2022-01-01 RX ORDER — QUETIAPINE FUMARATE 200 MG/1
12.5 TABLET, FILM COATED ORAL AT BEDTIME
Refills: 0 | Status: DISCONTINUED | OUTPATIENT
Start: 2022-01-01 | End: 2022-01-01

## 2022-01-01 RX ORDER — GLUCAGON INJECTION, SOLUTION 0.5 MG/.1ML
1 INJECTION, SOLUTION SUBCUTANEOUS ONCE
Refills: 0 | Status: DISCONTINUED | OUTPATIENT
Start: 2022-01-01 | End: 2022-01-01

## 2022-01-01 RX ORDER — INSULIN LISPRO 100/ML
15 VIAL (ML) SUBCUTANEOUS
Refills: 0 | Status: DISCONTINUED | OUTPATIENT
Start: 2022-01-01 | End: 2022-01-01

## 2022-01-01 RX ORDER — PANTOPRAZOLE SODIUM 20 MG/1
8 TABLET, DELAYED RELEASE ORAL
Qty: 80 | Refills: 0 | Status: DISCONTINUED | OUTPATIENT
Start: 2022-01-01 | End: 2022-01-01

## 2022-01-01 RX ORDER — MULTIVIT-MIN/FERROUS GLUCONATE 9 MG/15 ML
1 LIQUID (ML) ORAL DAILY
Refills: 0 | Status: DISCONTINUED | OUTPATIENT
Start: 2022-01-01 | End: 2022-01-01

## 2022-01-01 RX ORDER — OXYCODONE AND ACETAMINOPHEN 5; 325 MG/1; MG/1
2 TABLET ORAL EVERY 6 HOURS
Refills: 0 | Status: DISCONTINUED | OUTPATIENT
Start: 2022-01-01 | End: 2022-01-01

## 2022-01-01 RX ORDER — APIXABAN 2.5 MG/1
2.5 TABLET, FILM COATED ORAL EVERY 12 HOURS
Refills: 0 | Status: DISCONTINUED | OUTPATIENT
Start: 2022-01-01 | End: 2022-01-01

## 2022-01-01 RX ORDER — DILTIAZEM HCL 120 MG
120 CAPSULE, EXT RELEASE 24 HR ORAL AT BEDTIME
Refills: 0 | Status: DISCONTINUED | OUTPATIENT
Start: 2022-01-01 | End: 2022-01-01

## 2022-01-01 RX ORDER — SODIUM CHLORIDE 9 MG/ML
1000 INJECTION, SOLUTION INTRAVENOUS ONCE
Refills: 0 | Status: COMPLETED | OUTPATIENT
Start: 2022-01-01 | End: 2022-01-01

## 2022-01-01 RX ORDER — HUMAN INSULIN 100 [IU]/ML
6 INJECTION, SUSPENSION SUBCUTANEOUS EVERY 6 HOURS
Refills: 0 | Status: DISCONTINUED | OUTPATIENT
Start: 2022-01-01 | End: 2022-01-01

## 2022-01-01 RX ORDER — INSULIN ASPART 100 [IU]/ML
0 INJECTION, SOLUTION SUBCUTANEOUS
Qty: 0 | Refills: 0 | DISCHARGE

## 2022-01-01 RX ORDER — DILTIAZEM HCL 120 MG
90 CAPSULE, EXT RELEASE 24 HR ORAL EVERY 8 HOURS
Refills: 0 | Status: DISCONTINUED | OUTPATIENT
Start: 2022-01-01 | End: 2022-01-01

## 2022-01-01 RX ORDER — HUMAN INSULIN 100 [IU]/ML
12 INJECTION, SUSPENSION SUBCUTANEOUS EVERY 6 HOURS
Refills: 0 | Status: DISCONTINUED | OUTPATIENT
Start: 2022-01-01 | End: 2022-01-01

## 2022-01-01 RX ORDER — IPRATROPIUM/ALBUTEROL SULFATE 18-103MCG
3 AEROSOL WITH ADAPTER (GRAM) INHALATION EVERY 6 HOURS
Refills: 0 | Status: COMPLETED | OUTPATIENT
Start: 2022-01-01 | End: 2022-01-01

## 2022-01-01 RX ORDER — CEFTRIAXONE 500 MG/1
INJECTION, POWDER, FOR SOLUTION INTRAMUSCULAR; INTRAVENOUS
Refills: 0 | Status: COMPLETED | OUTPATIENT
Start: 2022-01-01 | End: 2022-01-01

## 2022-01-01 RX ORDER — APIXABAN 2.5 MG/1
2.5 TABLET, FILM COATED ORAL ONCE
Refills: 0 | Status: COMPLETED | OUTPATIENT
Start: 2022-01-01 | End: 2022-01-01

## 2022-01-01 RX ORDER — CEFTRIAXONE 500 MG/1
1000 INJECTION, POWDER, FOR SOLUTION INTRAMUSCULAR; INTRAVENOUS ONCE
Refills: 0 | Status: COMPLETED | OUTPATIENT
Start: 2022-01-01 | End: 2022-01-01

## 2022-01-01 RX ORDER — INSULIN GLARGINE 100 [IU]/ML
30 INJECTION, SOLUTION SUBCUTANEOUS AT BEDTIME
Refills: 0 | Status: DISCONTINUED | OUTPATIENT
Start: 2022-01-01 | End: 2022-01-01

## 2022-01-01 RX ORDER — HYDROMORPHONE HYDROCHLORIDE 2 MG/ML
0.5 INJECTION INTRAMUSCULAR; INTRAVENOUS; SUBCUTANEOUS
Refills: 0 | Status: DISCONTINUED | OUTPATIENT
Start: 2022-01-01 | End: 2022-01-01

## 2022-01-01 RX ORDER — INSULIN LISPRO 100/ML
12 VIAL (ML) SUBCUTANEOUS
Refills: 0 | Status: DISCONTINUED | OUTPATIENT
Start: 2022-01-01 | End: 2022-01-01

## 2022-01-01 RX ORDER — FENTANYL CITRATE 50 UG/ML
50 INJECTION INTRAVENOUS ONCE
Refills: 0 | Status: DISCONTINUED | OUTPATIENT
Start: 2022-01-01 | End: 2022-01-01

## 2022-01-01 RX ORDER — ROBINUL 0.2 MG/ML
0.4 INJECTION INTRAMUSCULAR; INTRAVENOUS EVERY 6 HOURS
Refills: 0 | Status: DISCONTINUED | OUTPATIENT
Start: 2022-01-01 | End: 2022-01-01

## 2022-01-01 RX ORDER — DESMOPRESSIN ACETATE 0.1 MG/1
24 TABLET ORAL ONCE
Refills: 0 | Status: COMPLETED | OUTPATIENT
Start: 2022-01-01 | End: 2022-01-01

## 2022-01-01 RX ORDER — MIDAZOLAM HYDROCHLORIDE 1 MG/ML
10 INJECTION, SOLUTION INTRAMUSCULAR; INTRAVENOUS ONCE
Refills: 0 | Status: DISCONTINUED | OUTPATIENT
Start: 2022-01-01 | End: 2022-01-01

## 2022-01-01 RX ORDER — ONDANSETRON 8 MG/1
4 TABLET, FILM COATED ORAL ONCE
Refills: 0 | Status: DISCONTINUED | OUTPATIENT
Start: 2022-01-01 | End: 2022-01-01

## 2022-01-01 RX ORDER — ALLOPURINOL 300 MG
1 TABLET ORAL
Qty: 0 | Refills: 0 | DISCHARGE

## 2022-01-01 RX ORDER — POLYETHYLENE GLYCOL 3350 17 G/17G
17 POWDER, FOR SOLUTION ORAL ONCE
Refills: 0 | Status: COMPLETED | OUTPATIENT
Start: 2022-01-01 | End: 2022-01-01

## 2022-01-01 RX ORDER — INFLUENZA VIRUS VACCINE 15; 15; 15; 15 UG/.5ML; UG/.5ML; UG/.5ML; UG/.5ML
0.7 SUSPENSION INTRAMUSCULAR ONCE
Refills: 0 | Status: DISCONTINUED | OUTPATIENT
Start: 2022-01-01 | End: 2022-01-01

## 2022-01-01 RX ORDER — CLOPIDOGREL BISULFATE 75 MG/1
1 TABLET, FILM COATED ORAL
Qty: 30 | Refills: 0
Start: 2022-01-01 | End: 2022-01-01

## 2022-01-01 RX ORDER — PIPERACILLIN AND TAZOBACTAM 4; .5 G/20ML; G/20ML
3.38 INJECTION, POWDER, LYOPHILIZED, FOR SOLUTION INTRAVENOUS EVERY 12 HOURS
Refills: 0 | Status: DISCONTINUED | OUTPATIENT
Start: 2022-01-01 | End: 2022-01-01

## 2022-01-01 RX ORDER — DABIGATRAN ETEXILATE MESYLATE 150 MG/1
1 CAPSULE ORAL
Qty: 0 | Refills: 0 | DISCHARGE

## 2022-01-01 RX ORDER — BUMETANIDE 0.25 MG/ML
2 INJECTION INTRAMUSCULAR; INTRAVENOUS ONCE
Refills: 0 | Status: COMPLETED | OUTPATIENT
Start: 2022-01-01 | End: 2022-01-01

## 2022-01-01 RX ORDER — CLOPIDOGREL BISULFATE 75 MG/1
1 TABLET, FILM COATED ORAL
Qty: 0 | Refills: 0 | DISCHARGE
Start: 2022-01-01

## 2022-01-01 RX ORDER — HYDRALAZINE HCL 50 MG
10 TABLET ORAL
Refills: 0 | Status: DISCONTINUED | OUTPATIENT
Start: 2022-01-01 | End: 2022-01-01

## 2022-01-01 RX ORDER — HEPARIN SODIUM 5000 [USP'U]/ML
2100 INJECTION INTRAVENOUS; SUBCUTANEOUS
Qty: 25000 | Refills: 0 | Status: DISCONTINUED | OUTPATIENT
Start: 2022-01-01 | End: 2022-01-01

## 2022-01-01 RX ADMIN — SEVELAMER CARBONATE 1600 MILLIGRAM(S): 2400 POWDER, FOR SUSPENSION ORAL at 17:20

## 2022-01-01 RX ADMIN — POTASSIUM PHOSPHATE, MONOBASIC POTASSIUM PHOSPHATE, DIBASIC 62.5 MILLIMOLE(S): 236; 224 INJECTION, SOLUTION INTRAVENOUS at 02:53

## 2022-01-01 RX ADMIN — Medication 2: at 12:07

## 2022-01-01 RX ADMIN — APIXABAN 5 MILLIGRAM(S): 2.5 TABLET, FILM COATED ORAL at 05:38

## 2022-01-01 RX ADMIN — SEVELAMER CARBONATE 1600 MILLIGRAM(S): 2400 POWDER, FOR SUSPENSION ORAL at 13:05

## 2022-01-01 RX ADMIN — ATORVASTATIN CALCIUM 40 MILLIGRAM(S): 80 TABLET, FILM COATED ORAL at 22:35

## 2022-01-01 RX ADMIN — Medication 1 APPLICATION(S): at 13:13

## 2022-01-01 RX ADMIN — Medication 1 APPLICATION(S): at 12:30

## 2022-01-01 RX ADMIN — PIPERACILLIN AND TAZOBACTAM 200 GRAM(S): 4; .5 INJECTION, POWDER, LYOPHILIZED, FOR SOLUTION INTRAVENOUS at 23:54

## 2022-01-01 RX ADMIN — ATORVASTATIN CALCIUM 40 MILLIGRAM(S): 80 TABLET, FILM COATED ORAL at 03:04

## 2022-01-01 RX ADMIN — Medication 25 MILLIGRAM(S): at 21:57

## 2022-01-01 RX ADMIN — POLYETHYLENE GLYCOL 3350 17 GRAM(S): 17 POWDER, FOR SOLUTION ORAL at 21:50

## 2022-01-01 RX ADMIN — ATORVASTATIN CALCIUM 40 MILLIGRAM(S): 80 TABLET, FILM COATED ORAL at 22:26

## 2022-01-01 RX ADMIN — Medication 1 TABLET(S): at 11:56

## 2022-01-01 RX ADMIN — ERYTHROPOIETIN 10000 UNIT(S): 10000 INJECTION, SOLUTION INTRAVENOUS; SUBCUTANEOUS at 16:05

## 2022-01-01 RX ADMIN — SENNA PLUS 2 TABLET(S): 8.6 TABLET ORAL at 21:35

## 2022-01-01 RX ADMIN — SENNA PLUS 2 TABLET(S): 8.6 TABLET ORAL at 00:07

## 2022-01-01 RX ADMIN — Medication 2: at 17:35

## 2022-01-01 RX ADMIN — HEPARIN SODIUM 2200 UNIT(S)/HR: 5000 INJECTION INTRAVENOUS; SUBCUTANEOUS at 16:33

## 2022-01-01 RX ADMIN — Medication 100 MILLIGRAM(S): at 11:27

## 2022-01-01 RX ADMIN — HUMAN INSULIN 6 UNIT(S): 100 INJECTION, SUSPENSION SUBCUTANEOUS at 11:42

## 2022-01-01 RX ADMIN — Medication 12.5 MILLIGRAM(S): at 23:39

## 2022-01-01 RX ADMIN — POLYETHYLENE GLYCOL 3350 17 GRAM(S): 17 POWDER, FOR SOLUTION ORAL at 18:24

## 2022-01-01 RX ADMIN — SENNA PLUS 2 TABLET(S): 8.6 TABLET ORAL at 22:42

## 2022-01-01 RX ADMIN — POLYETHYLENE GLYCOL 3350 17 GRAM(S): 17 POWDER, FOR SOLUTION ORAL at 05:55

## 2022-01-01 RX ADMIN — Medication 1 DROP(S): at 11:34

## 2022-01-01 RX ADMIN — Medication 10 MG/HR: at 12:31

## 2022-01-01 RX ADMIN — POLYETHYLENE GLYCOL 3350 17 GRAM(S): 17 POWDER, FOR SOLUTION ORAL at 18:45

## 2022-01-01 RX ADMIN — ATORVASTATIN CALCIUM 40 MILLIGRAM(S): 80 TABLET, FILM COATED ORAL at 21:42

## 2022-01-01 RX ADMIN — Medication 1 APPLICATION(S): at 17:47

## 2022-01-01 RX ADMIN — Medication 1 DROP(S): at 23:33

## 2022-01-01 RX ADMIN — LIDOCAINE 1 PATCH: 4 CREAM TOPICAL at 13:10

## 2022-01-01 RX ADMIN — ATORVASTATIN CALCIUM 40 MILLIGRAM(S): 80 TABLET, FILM COATED ORAL at 21:27

## 2022-01-01 RX ADMIN — Medication 120 MILLIGRAM(S): at 05:38

## 2022-01-01 RX ADMIN — Medication 100 MILLIGRAM(S): at 13:10

## 2022-01-01 RX ADMIN — Medication 7.5 MILLIGRAM(S): at 13:52

## 2022-01-01 RX ADMIN — ATORVASTATIN CALCIUM 40 MILLIGRAM(S): 80 TABLET, FILM COATED ORAL at 22:06

## 2022-01-01 RX ADMIN — Medication 5 MILLIGRAM(S): at 05:26

## 2022-01-01 RX ADMIN — Medication 81 MILLIGRAM(S): at 13:42

## 2022-01-01 RX ADMIN — Medication 100 MILLIGRAM(S): at 17:57

## 2022-01-01 RX ADMIN — APIXABAN 5 MILLIGRAM(S): 2.5 TABLET, FILM COATED ORAL at 12:28

## 2022-01-01 RX ADMIN — HEPARIN SODIUM 42 UNIT(S)/HR: 5000 INJECTION INTRAVENOUS; SUBCUTANEOUS at 07:28

## 2022-01-01 RX ADMIN — Medication 4: at 13:04

## 2022-01-01 RX ADMIN — OXYCODONE AND ACETAMINOPHEN 1 TABLET(S): 5; 325 TABLET ORAL at 12:50

## 2022-01-01 RX ADMIN — ATORVASTATIN CALCIUM 40 MILLIGRAM(S): 80 TABLET, FILM COATED ORAL at 21:37

## 2022-01-01 RX ADMIN — Medication 2: at 13:38

## 2022-01-01 RX ADMIN — MUPIROCIN 1 APPLICATION(S): 20 OINTMENT TOPICAL at 05:15

## 2022-01-01 RX ADMIN — ERYTHROPOIETIN 20000 UNIT(S): 10000 INJECTION, SOLUTION INTRAVENOUS; SUBCUTANEOUS at 14:57

## 2022-01-01 RX ADMIN — Medication 200 GRAM(S): at 02:44

## 2022-01-01 RX ADMIN — Medication 1 GRAM(S): at 17:57

## 2022-01-01 RX ADMIN — Medication 1 MILLIGRAM(S): at 21:14

## 2022-01-01 RX ADMIN — Medication 1: at 08:02

## 2022-01-01 RX ADMIN — APIXABAN 5 MILLIGRAM(S): 2.5 TABLET, FILM COATED ORAL at 05:20

## 2022-01-01 RX ADMIN — Medication 10 MILLIGRAM(S): at 06:40

## 2022-01-01 RX ADMIN — LIDOCAINE 1 PATCH: 4 CREAM TOPICAL at 02:46

## 2022-01-01 RX ADMIN — Medication 650 MILLIGRAM(S): at 22:58

## 2022-01-01 RX ADMIN — POLYETHYLENE GLYCOL 3350 17 GRAM(S): 17 POWDER, FOR SOLUTION ORAL at 06:52

## 2022-01-01 RX ADMIN — Medication 120 MILLIGRAM(S): at 14:00

## 2022-01-01 RX ADMIN — Medication 100 MILLIGRAM(S): at 12:41

## 2022-01-01 RX ADMIN — Medication 5 MILLIGRAM(S): at 01:12

## 2022-01-01 RX ADMIN — INSULIN GLARGINE 28 UNIT(S): 100 INJECTION, SOLUTION SUBCUTANEOUS at 22:53

## 2022-01-01 RX ADMIN — Medication 2: at 14:02

## 2022-01-01 RX ADMIN — CLOPIDOGREL BISULFATE 75 MILLIGRAM(S): 75 TABLET, FILM COATED ORAL at 12:47

## 2022-01-01 RX ADMIN — Medication 2: at 09:00

## 2022-01-01 RX ADMIN — SEVELAMER CARBONATE 1600 MILLIGRAM(S): 2400 POWDER, FOR SUSPENSION ORAL at 18:32

## 2022-01-01 RX ADMIN — APIXABAN 5 MILLIGRAM(S): 2.5 TABLET, FILM COATED ORAL at 17:20

## 2022-01-01 RX ADMIN — PANTOPRAZOLE SODIUM 40 MILLIGRAM(S): 20 TABLET, DELAYED RELEASE ORAL at 05:24

## 2022-01-01 RX ADMIN — Medication 10 MILLIGRAM(S): at 05:20

## 2022-01-01 RX ADMIN — APIXABAN 5 MILLIGRAM(S): 2.5 TABLET, FILM COATED ORAL at 18:33

## 2022-01-01 RX ADMIN — Medication 81 MILLIGRAM(S): at 11:42

## 2022-01-01 RX ADMIN — APIXABAN 5 MILLIGRAM(S): 2.5 TABLET, FILM COATED ORAL at 22:41

## 2022-01-01 RX ADMIN — LIDOCAINE 1 PATCH: 4 CREAM TOPICAL at 13:02

## 2022-01-01 RX ADMIN — HEPARIN SODIUM 1700 UNIT(S)/HR: 5000 INJECTION INTRAVENOUS; SUBCUTANEOUS at 22:40

## 2022-01-01 RX ADMIN — MUPIROCIN 1 APPLICATION(S): 20 OINTMENT TOPICAL at 05:05

## 2022-01-01 RX ADMIN — CHLORHEXIDINE GLUCONATE 1 APPLICATION(S): 213 SOLUTION TOPICAL at 05:07

## 2022-01-01 RX ADMIN — Medication 25 MILLIGRAM(S): at 12:35

## 2022-01-01 RX ADMIN — ATORVASTATIN CALCIUM 40 MILLIGRAM(S): 80 TABLET, FILM COATED ORAL at 22:08

## 2022-01-01 RX ADMIN — Medication 1: at 17:45

## 2022-01-01 RX ADMIN — LIDOCAINE 1 PATCH: 4 CREAM TOPICAL at 01:00

## 2022-01-01 RX ADMIN — Medication 25 MILLIGRAM(S): at 06:28

## 2022-01-01 RX ADMIN — APIXABAN 5 MILLIGRAM(S): 2.5 TABLET, FILM COATED ORAL at 06:02

## 2022-01-01 RX ADMIN — Medication 100 MILLIGRAM(S): at 05:35

## 2022-01-01 RX ADMIN — Medication 100 MILLIGRAM(S): at 13:39

## 2022-01-01 RX ADMIN — Medication 25 MILLIGRAM(S): at 18:11

## 2022-01-01 RX ADMIN — HEPARIN SODIUM 2700 UNIT(S)/HR: 5000 INJECTION INTRAVENOUS; SUBCUTANEOUS at 08:20

## 2022-01-01 RX ADMIN — SEVELAMER CARBONATE 1600 MILLIGRAM(S): 2400 POWDER, FOR SUSPENSION ORAL at 17:21

## 2022-01-01 RX ADMIN — Medication 1 APPLICATION(S): at 16:57

## 2022-01-01 RX ADMIN — Medication 81 MILLIGRAM(S): at 12:36

## 2022-01-01 RX ADMIN — POLYETHYLENE GLYCOL 3350 17 GRAM(S): 17 POWDER, FOR SOLUTION ORAL at 05:34

## 2022-01-01 RX ADMIN — APIXABAN 5 MILLIGRAM(S): 2.5 TABLET, FILM COATED ORAL at 02:53

## 2022-01-01 RX ADMIN — PANTOPRAZOLE SODIUM 10 MG/HR: 20 TABLET, DELAYED RELEASE ORAL at 07:15

## 2022-01-01 RX ADMIN — LIDOCAINE 1 PATCH: 4 CREAM TOPICAL at 11:32

## 2022-01-01 RX ADMIN — SENNA PLUS 2 TABLET(S): 8.6 TABLET ORAL at 22:05

## 2022-01-01 RX ADMIN — Medication 1 TABLET(S): at 11:43

## 2022-01-01 RX ADMIN — SEVELAMER CARBONATE 1600 MILLIGRAM(S): 2400 POWDER, FOR SUSPENSION ORAL at 12:19

## 2022-01-01 RX ADMIN — APIXABAN 2.5 MILLIGRAM(S): 2.5 TABLET, FILM COATED ORAL at 05:15

## 2022-01-01 RX ADMIN — ATORVASTATIN CALCIUM 40 MILLIGRAM(S): 80 TABLET, FILM COATED ORAL at 21:11

## 2022-01-01 RX ADMIN — ATORVASTATIN CALCIUM 40 MILLIGRAM(S): 80 TABLET, FILM COATED ORAL at 21:19

## 2022-01-01 RX ADMIN — CHLORHEXIDINE GLUCONATE 1 APPLICATION(S): 213 SOLUTION TOPICAL at 13:10

## 2022-01-01 RX ADMIN — LIDOCAINE 1 PATCH: 4 CREAM TOPICAL at 12:22

## 2022-01-01 RX ADMIN — Medication 100 MILLIGRAM(S): at 18:05

## 2022-01-01 RX ADMIN — SEVELAMER CARBONATE 1600 MILLIGRAM(S): 2400 POWDER, FOR SUSPENSION ORAL at 08:39

## 2022-01-01 RX ADMIN — Medication 1: at 18:09

## 2022-01-01 RX ADMIN — Medication 25 MILLIGRAM(S): at 07:38

## 2022-01-01 RX ADMIN — SEVELAMER CARBONATE 1600 MILLIGRAM(S): 2400 POWDER, FOR SUSPENSION ORAL at 08:18

## 2022-01-01 RX ADMIN — Medication 1: at 08:35

## 2022-01-01 RX ADMIN — ERYTHROPOIETIN 10000 UNIT(S): 10000 INJECTION, SOLUTION INTRAVENOUS; SUBCUTANEOUS at 18:31

## 2022-01-01 RX ADMIN — Medication 1 DROP(S): at 11:31

## 2022-01-01 RX ADMIN — LIDOCAINE 1 PATCH: 4 CREAM TOPICAL at 23:34

## 2022-01-01 RX ADMIN — ATORVASTATIN CALCIUM 40 MILLIGRAM(S): 80 TABLET, FILM COATED ORAL at 21:36

## 2022-01-01 RX ADMIN — Medication 120 MILLIGRAM(S): at 08:38

## 2022-01-01 RX ADMIN — SEVELAMER CARBONATE 1600 MILLIGRAM(S): 2400 POWDER, FOR SUSPENSION ORAL at 08:50

## 2022-01-01 RX ADMIN — Medication 1 DROP(S): at 23:22

## 2022-01-01 RX ADMIN — Medication 25 MILLIGRAM(S): at 05:15

## 2022-01-01 RX ADMIN — Medication 2: at 13:41

## 2022-01-01 RX ADMIN — Medication 25 MILLIGRAM(S): at 23:54

## 2022-01-01 RX ADMIN — Medication 7.5 MILLIGRAM(S): at 05:55

## 2022-01-01 RX ADMIN — Medication 81 MILLIGRAM(S): at 12:05

## 2022-01-01 RX ADMIN — POLYETHYLENE GLYCOL 3350 17 GRAM(S): 17 POWDER, FOR SOLUTION ORAL at 18:02

## 2022-01-01 RX ADMIN — SODIUM CHLORIDE 4 MILLILITER(S): 9 INJECTION INTRAMUSCULAR; INTRAVENOUS; SUBCUTANEOUS at 17:03

## 2022-01-01 RX ADMIN — HUMAN INSULIN 6 UNIT(S): 100 INJECTION, SUSPENSION SUBCUTANEOUS at 17:41

## 2022-01-01 RX ADMIN — SENNA PLUS 2 TABLET(S): 8.6 TABLET ORAL at 21:26

## 2022-01-01 RX ADMIN — LIDOCAINE 1 PATCH: 4 CREAM TOPICAL at 18:09

## 2022-01-01 RX ADMIN — Medication 5 MILLIGRAM(S): at 16:12

## 2022-01-01 RX ADMIN — Medication 650 MILLIGRAM(S): at 17:18

## 2022-01-01 RX ADMIN — Medication 120 MILLIGRAM(S): at 13:19

## 2022-01-01 RX ADMIN — HEPARIN SODIUM 3500 UNIT(S)/HR: 5000 INJECTION INTRAVENOUS; SUBCUTANEOUS at 07:20

## 2022-01-01 RX ADMIN — Medication 400 MILLIGRAM(S): at 12:35

## 2022-01-01 RX ADMIN — Medication 3: at 12:51

## 2022-01-01 RX ADMIN — Medication 1 DROP(S): at 05:16

## 2022-01-01 RX ADMIN — APIXABAN 5 MILLIGRAM(S): 2.5 TABLET, FILM COATED ORAL at 18:13

## 2022-01-01 RX ADMIN — Medication 1: at 08:39

## 2022-01-01 RX ADMIN — ATORVASTATIN CALCIUM 40 MILLIGRAM(S): 80 TABLET, FILM COATED ORAL at 23:55

## 2022-01-01 RX ADMIN — Medication 1 APPLICATION(S): at 12:24

## 2022-01-01 RX ADMIN — HEPARIN SODIUM 3000 UNIT(S)/HR: 5000 INJECTION INTRAVENOUS; SUBCUTANEOUS at 08:13

## 2022-01-01 RX ADMIN — LIDOCAINE 1 PATCH: 4 CREAM TOPICAL at 19:00

## 2022-01-01 RX ADMIN — APIXABAN 5 MILLIGRAM(S): 2.5 TABLET, FILM COATED ORAL at 05:37

## 2022-01-01 RX ADMIN — Medication 25 MILLIGRAM(S): at 16:08

## 2022-01-01 RX ADMIN — Medication 1 GRAM(S): at 06:02

## 2022-01-01 RX ADMIN — INSULIN GLARGINE 25 UNIT(S): 100 INJECTION, SOLUTION SUBCUTANEOUS at 22:21

## 2022-01-01 RX ADMIN — POLYETHYLENE GLYCOL 3350 17 GRAM(S): 17 POWDER, FOR SOLUTION ORAL at 05:59

## 2022-01-01 RX ADMIN — Medication 120 MILLIGRAM(S): at 06:06

## 2022-01-01 RX ADMIN — Medication 100 MILLIGRAM(S): at 11:43

## 2022-01-01 RX ADMIN — SENNA PLUS 2 TABLET(S): 8.6 TABLET ORAL at 22:35

## 2022-01-01 RX ADMIN — LIDOCAINE 1 PATCH: 4 CREAM TOPICAL at 11:55

## 2022-01-01 RX ADMIN — Medication 7.5 MILLIGRAM(S): at 12:48

## 2022-01-01 RX ADMIN — Medication 1000 MILLIGRAM(S): at 16:53

## 2022-01-01 RX ADMIN — ATORVASTATIN CALCIUM 40 MILLIGRAM(S): 80 TABLET, FILM COATED ORAL at 21:57

## 2022-01-01 RX ADMIN — Medication 5 MILLIGRAM(S): at 03:03

## 2022-01-01 RX ADMIN — Medication 100 MILLIGRAM(S): at 14:02

## 2022-01-01 RX ADMIN — Medication 100 MILLIGRAM(S): at 13:09

## 2022-01-01 RX ADMIN — Medication 1 DROP(S): at 00:25

## 2022-01-01 RX ADMIN — Medication 120 MILLIGRAM(S): at 05:08

## 2022-01-01 RX ADMIN — HEPARIN SODIUM 5000 UNIT(S): 5000 INJECTION INTRAVENOUS; SUBCUTANEOUS at 00:47

## 2022-01-01 RX ADMIN — SEVELAMER CARBONATE 1600 MILLIGRAM(S): 2400 POWDER, FOR SUSPENSION ORAL at 12:42

## 2022-01-01 RX ADMIN — Medication 100 MILLIGRAM(S): at 12:53

## 2022-01-01 RX ADMIN — SEVELAMER CARBONATE 1600 MILLIGRAM(S): 2400 POWDER, FOR SUSPENSION ORAL at 09:02

## 2022-01-01 RX ADMIN — Medication 7.5 MILLIGRAM(S): at 00:11

## 2022-01-01 RX ADMIN — ERYTHROPOIETIN 20000 UNIT(S): 10000 INJECTION, SOLUTION INTRAVENOUS; SUBCUTANEOUS at 18:06

## 2022-01-01 RX ADMIN — Medication 650 MILLIGRAM(S): at 20:19

## 2022-01-01 RX ADMIN — Medication 100 MILLIGRAM(S): at 19:09

## 2022-01-01 RX ADMIN — APIXABAN 5 MILLIGRAM(S): 2.5 TABLET, FILM COATED ORAL at 17:19

## 2022-01-01 RX ADMIN — Medication 2: at 09:20

## 2022-01-01 RX ADMIN — Medication 4: at 01:05

## 2022-01-01 RX ADMIN — Medication 81 MILLIGRAM(S): at 11:29

## 2022-01-01 RX ADMIN — HEPARIN SODIUM 3400 UNIT(S)/HR: 5000 INJECTION INTRAVENOUS; SUBCUTANEOUS at 07:21

## 2022-01-01 RX ADMIN — SEVELAMER CARBONATE 800 MILLIGRAM(S): 2400 POWDER, FOR SUSPENSION ORAL at 17:27

## 2022-01-01 RX ADMIN — HUMAN INSULIN 6 UNIT(S): 100 INJECTION, SUSPENSION SUBCUTANEOUS at 12:57

## 2022-01-01 RX ADMIN — ATORVASTATIN CALCIUM 40 MILLIGRAM(S): 80 TABLET, FILM COATED ORAL at 23:41

## 2022-01-01 RX ADMIN — CEFTRIAXONE 100 MILLIGRAM(S): 500 INJECTION, POWDER, FOR SOLUTION INTRAMUSCULAR; INTRAVENOUS at 10:47

## 2022-01-01 RX ADMIN — Medication 81 MILLIGRAM(S): at 13:00

## 2022-01-01 RX ADMIN — DABIGATRAN ETEXILATE MESYLATE 150 MILLIGRAM(S): 150 CAPSULE ORAL at 01:53

## 2022-01-01 RX ADMIN — SENNA PLUS 2 TABLET(S): 8.6 TABLET ORAL at 22:30

## 2022-01-01 RX ADMIN — Medication 120 MILLIGRAM(S): at 06:43

## 2022-01-01 RX ADMIN — LIDOCAINE 1 PATCH: 4 CREAM TOPICAL at 00:25

## 2022-01-01 RX ADMIN — ERYTHROPOIETIN 10000 UNIT(S): 10000 INJECTION, SOLUTION INTRAVENOUS; SUBCUTANEOUS at 00:43

## 2022-01-01 RX ADMIN — Medication 7.5 MILLIGRAM(S): at 13:06

## 2022-01-01 RX ADMIN — Medication 1: at 08:24

## 2022-01-01 RX ADMIN — CHLORHEXIDINE GLUCONATE 1 APPLICATION(S): 213 SOLUTION TOPICAL at 06:53

## 2022-01-01 RX ADMIN — SEVELAMER CARBONATE 1600 MILLIGRAM(S): 2400 POWDER, FOR SUSPENSION ORAL at 11:11

## 2022-01-01 RX ADMIN — CHLORHEXIDINE GLUCONATE 1 APPLICATION(S): 213 SOLUTION TOPICAL at 08:39

## 2022-01-01 RX ADMIN — Medication 1: at 13:44

## 2022-01-01 RX ADMIN — Medication 650 MILLIGRAM(S): at 07:15

## 2022-01-01 RX ADMIN — Medication 100 MILLIGRAM(S): at 11:42

## 2022-01-01 RX ADMIN — Medication 100 MILLIGRAM(S): at 07:54

## 2022-01-01 RX ADMIN — Medication 1 GRAM(S): at 17:34

## 2022-01-01 RX ADMIN — LIDOCAINE 1 PATCH: 4 CREAM TOPICAL at 23:58

## 2022-01-01 RX ADMIN — Medication 100 MILLIGRAM(S): at 05:15

## 2022-01-01 RX ADMIN — Medication 10 MILLIGRAM(S): at 05:56

## 2022-01-01 RX ADMIN — FENTANYL CITRATE 25 MICROGRAM(S): 50 INJECTION INTRAVENOUS at 12:00

## 2022-01-01 RX ADMIN — Medication 400 MILLIGRAM(S): at 02:12

## 2022-01-01 RX ADMIN — Medication 2: at 05:27

## 2022-01-01 RX ADMIN — LIDOCAINE 1 PATCH: 4 CREAM TOPICAL at 00:30

## 2022-01-01 RX ADMIN — LIDOCAINE 1 PATCH: 4 CREAM TOPICAL at 23:50

## 2022-01-01 RX ADMIN — LIDOCAINE 1 PATCH: 4 CREAM TOPICAL at 22:40

## 2022-01-01 RX ADMIN — HEPARIN SODIUM 2400 UNIT(S)/HR: 5000 INJECTION INTRAVENOUS; SUBCUTANEOUS at 15:38

## 2022-01-01 RX ADMIN — Medication 1 APPLICATION(S): at 12:46

## 2022-01-01 RX ADMIN — SEVELAMER CARBONATE 1600 MILLIGRAM(S): 2400 POWDER, FOR SUSPENSION ORAL at 12:13

## 2022-01-01 RX ADMIN — APIXABAN 2.5 MILLIGRAM(S): 2.5 TABLET, FILM COATED ORAL at 05:42

## 2022-01-01 RX ADMIN — Medication 1 DROP(S): at 05:47

## 2022-01-01 RX ADMIN — HUMAN INSULIN 4 UNIT(S): 100 INJECTION, SUSPENSION SUBCUTANEOUS at 12:59

## 2022-01-01 RX ADMIN — Medication 1000 MILLIGRAM(S): at 23:10

## 2022-01-01 RX ADMIN — Medication 100 MILLIGRAM(S): at 05:18

## 2022-01-01 RX ADMIN — SEVELAMER CARBONATE 1600 MILLIGRAM(S): 2400 POWDER, FOR SUSPENSION ORAL at 21:58

## 2022-01-01 RX ADMIN — LIDOCAINE 1 PATCH: 4 CREAM TOPICAL at 05:00

## 2022-01-01 RX ADMIN — Medication 3: at 17:51

## 2022-01-01 RX ADMIN — Medication 1 GRAM(S): at 05:15

## 2022-01-01 RX ADMIN — Medication 90 MILLIGRAM(S): at 13:00

## 2022-01-01 RX ADMIN — SENNA PLUS 2 TABLET(S): 8.6 TABLET ORAL at 21:38

## 2022-01-01 RX ADMIN — Medication 120 MILLIGRAM(S): at 05:44

## 2022-01-01 RX ADMIN — ATORVASTATIN CALCIUM 40 MILLIGRAM(S): 80 TABLET, FILM COATED ORAL at 21:50

## 2022-01-01 RX ADMIN — Medication 81 MILLIGRAM(S): at 12:33

## 2022-01-01 RX ADMIN — Medication 81 MILLIGRAM(S): at 13:08

## 2022-01-01 RX ADMIN — Medication 1 MILLIGRAM(S): at 11:50

## 2022-01-01 RX ADMIN — APIXABAN 5 MILLIGRAM(S): 2.5 TABLET, FILM COATED ORAL at 21:50

## 2022-01-01 RX ADMIN — CEFTRIAXONE 100 MILLIGRAM(S): 500 INJECTION, POWDER, FOR SOLUTION INTRAMUSCULAR; INTRAVENOUS at 09:59

## 2022-01-01 RX ADMIN — Medication 240 MILLIGRAM(S): at 05:35

## 2022-01-01 RX ADMIN — Medication 100 MILLIGRAM(S): at 22:35

## 2022-01-01 RX ADMIN — Medication 1 TABLET(S): at 12:06

## 2022-01-01 RX ADMIN — ATORVASTATIN CALCIUM 40 MILLIGRAM(S): 80 TABLET, FILM COATED ORAL at 22:07

## 2022-01-01 RX ADMIN — Medication 25 MILLIGRAM(S): at 13:01

## 2022-01-01 RX ADMIN — LIDOCAINE 1 PATCH: 4 CREAM TOPICAL at 17:26

## 2022-01-01 RX ADMIN — OXYCODONE AND ACETAMINOPHEN 2 TABLET(S): 5; 325 TABLET ORAL at 12:26

## 2022-01-01 RX ADMIN — HYDROMORPHONE HYDROCHLORIDE 1 MILLIGRAM(S): 2 INJECTION INTRAMUSCULAR; INTRAVENOUS; SUBCUTANEOUS at 22:30

## 2022-01-01 RX ADMIN — Medication 25 MILLIGRAM(S): at 13:05

## 2022-01-01 RX ADMIN — POLYETHYLENE GLYCOL 3350 17 GRAM(S): 17 POWDER, FOR SOLUTION ORAL at 05:48

## 2022-01-01 RX ADMIN — Medication 120 MILLIGRAM(S): at 05:18

## 2022-01-01 RX ADMIN — Medication 1 APPLICATION(S): at 11:44

## 2022-01-01 RX ADMIN — Medication 4: at 17:17

## 2022-01-01 RX ADMIN — HEPARIN SODIUM 0 UNIT(S)/HR: 5000 INJECTION INTRAVENOUS; SUBCUTANEOUS at 03:00

## 2022-01-01 RX ADMIN — HEPARIN SODIUM 1700 UNIT(S)/HR: 5000 INJECTION INTRAVENOUS; SUBCUTANEOUS at 08:11

## 2022-01-01 RX ADMIN — APIXABAN 5 MILLIGRAM(S): 2.5 TABLET, FILM COATED ORAL at 05:55

## 2022-01-01 RX ADMIN — HEPARIN SODIUM 42 UNIT(S)/HR: 5000 INJECTION INTRAVENOUS; SUBCUTANEOUS at 03:51

## 2022-01-01 RX ADMIN — LIDOCAINE 1 PATCH: 4 CREAM TOPICAL at 21:00

## 2022-01-01 RX ADMIN — Medication 25 MILLIGRAM(S): at 21:42

## 2022-01-01 RX ADMIN — HEPARIN SODIUM 5000 UNIT(S): 5000 INJECTION INTRAVENOUS; SUBCUTANEOUS at 23:32

## 2022-01-01 RX ADMIN — APIXABAN 5 MILLIGRAM(S): 2.5 TABLET, FILM COATED ORAL at 05:41

## 2022-01-01 RX ADMIN — SENNA PLUS 2 TABLET(S): 8.6 TABLET ORAL at 21:04

## 2022-01-01 RX ADMIN — Medication 25 MILLIGRAM(S): at 22:53

## 2022-01-01 RX ADMIN — Medication 25 MILLIGRAM(S): at 22:07

## 2022-01-01 RX ADMIN — Medication 25 MILLIGRAM(S): at 00:07

## 2022-01-01 RX ADMIN — HEPARIN SODIUM 3900 UNIT(S)/HR: 5000 INJECTION INTRAVENOUS; SUBCUTANEOUS at 17:32

## 2022-01-01 RX ADMIN — Medication 1 APPLICATION(S): at 14:08

## 2022-01-01 RX ADMIN — SEVELAMER CARBONATE 1600 MILLIGRAM(S): 2400 POWDER, FOR SUSPENSION ORAL at 12:08

## 2022-01-01 RX ADMIN — POLYETHYLENE GLYCOL 3350 17 GRAM(S): 17 POWDER, FOR SOLUTION ORAL at 17:17

## 2022-01-01 RX ADMIN — Medication 2: at 11:42

## 2022-01-01 RX ADMIN — HEPARIN SODIUM 3800 UNIT(S)/HR: 5000 INJECTION INTRAVENOUS; SUBCUTANEOUS at 18:47

## 2022-01-01 RX ADMIN — OXYCODONE AND ACETAMINOPHEN 1 TABLET(S): 5; 325 TABLET ORAL at 22:20

## 2022-01-01 RX ADMIN — Medication 12 UNIT(S): at 13:09

## 2022-01-01 RX ADMIN — SEVELAMER CARBONATE 1600 MILLIGRAM(S): 2400 POWDER, FOR SUSPENSION ORAL at 09:19

## 2022-01-01 RX ADMIN — Medication 2: at 13:13

## 2022-01-01 RX ADMIN — HEPARIN SODIUM 2000 UNIT(S)/HR: 5000 INJECTION INTRAVENOUS; SUBCUTANEOUS at 07:50

## 2022-01-01 RX ADMIN — POLYETHYLENE GLYCOL 3350 17 GRAM(S): 17 POWDER, FOR SOLUTION ORAL at 17:27

## 2022-01-01 RX ADMIN — SEVELAMER CARBONATE 1600 MILLIGRAM(S): 2400 POWDER, FOR SUSPENSION ORAL at 14:40

## 2022-01-01 RX ADMIN — HEPARIN SODIUM 3900 UNIT(S)/HR: 5000 INJECTION INTRAVENOUS; SUBCUTANEOUS at 19:38

## 2022-01-01 RX ADMIN — POLYETHYLENE GLYCOL 3350 17 GRAM(S): 17 POWDER, FOR SOLUTION ORAL at 05:41

## 2022-01-01 RX ADMIN — MUPIROCIN 1 APPLICATION(S): 20 OINTMENT TOPICAL at 20:42

## 2022-01-01 RX ADMIN — Medication 1: at 08:29

## 2022-01-01 RX ADMIN — Medication 20 UNIT(S): at 07:54

## 2022-01-01 RX ADMIN — OXYCODONE AND ACETAMINOPHEN 1 TABLET(S): 5; 325 TABLET ORAL at 22:06

## 2022-01-01 RX ADMIN — CLOPIDOGREL BISULFATE 75 MILLIGRAM(S): 75 TABLET, FILM COATED ORAL at 11:25

## 2022-01-01 RX ADMIN — SEVELAMER CARBONATE 1600 MILLIGRAM(S): 2400 POWDER, FOR SUSPENSION ORAL at 17:55

## 2022-01-01 RX ADMIN — SODIUM CHLORIDE 40 MILLILITER(S): 9 INJECTION INTRAMUSCULAR; INTRAVENOUS; SUBCUTANEOUS at 21:35

## 2022-01-01 RX ADMIN — POLYETHYLENE GLYCOL 3350 17 GRAM(S): 17 POWDER, FOR SOLUTION ORAL at 05:42

## 2022-01-01 RX ADMIN — Medication 25 MILLIGRAM(S): at 18:52

## 2022-01-01 RX ADMIN — PANTOPRAZOLE SODIUM 40 MILLIGRAM(S): 20 TABLET, DELAYED RELEASE ORAL at 05:32

## 2022-01-01 RX ADMIN — SODIUM CHLORIDE 4 MILLILITER(S): 9 INJECTION INTRAMUSCULAR; INTRAVENOUS; SUBCUTANEOUS at 17:14

## 2022-01-01 RX ADMIN — PANTOPRAZOLE SODIUM 10 MG/HR: 20 TABLET, DELAYED RELEASE ORAL at 21:44

## 2022-01-01 RX ADMIN — APIXABAN 5 MILLIGRAM(S): 2.5 TABLET, FILM COATED ORAL at 02:58

## 2022-01-01 RX ADMIN — CEFTRIAXONE 100 MILLIGRAM(S): 500 INJECTION, POWDER, FOR SOLUTION INTRAMUSCULAR; INTRAVENOUS at 13:39

## 2022-01-01 RX ADMIN — HEPARIN SODIUM 2000 UNIT(S)/HR: 5000 INJECTION INTRAVENOUS; SUBCUTANEOUS at 04:03

## 2022-01-01 RX ADMIN — POLYETHYLENE GLYCOL 3350 17 GRAM(S): 17 POWDER, FOR SOLUTION ORAL at 07:36

## 2022-01-01 RX ADMIN — SEVELAMER CARBONATE 1600 MILLIGRAM(S): 2400 POWDER, FOR SUSPENSION ORAL at 08:51

## 2022-01-01 RX ADMIN — OXYCODONE AND ACETAMINOPHEN 1 TABLET(S): 5; 325 TABLET ORAL at 12:20

## 2022-01-01 RX ADMIN — SENNA PLUS 2 TABLET(S): 8.6 TABLET ORAL at 22:25

## 2022-01-01 RX ADMIN — Medication 10 UNIT(S): at 18:27

## 2022-01-01 RX ADMIN — Medication 25 MILLIGRAM(S): at 05:11

## 2022-01-01 RX ADMIN — CLOPIDOGREL BISULFATE 75 MILLIGRAM(S): 75 TABLET, FILM COATED ORAL at 12:40

## 2022-01-01 RX ADMIN — MORPHINE SULFATE 2 MILLIGRAM(S): 50 CAPSULE, EXTENDED RELEASE ORAL at 18:56

## 2022-01-01 RX ADMIN — Medication 1 DROP(S): at 11:30

## 2022-01-01 RX ADMIN — ATORVASTATIN CALCIUM 40 MILLIGRAM(S): 80 TABLET, FILM COATED ORAL at 22:25

## 2022-01-01 RX ADMIN — SEVELAMER CARBONATE 1600 MILLIGRAM(S): 2400 POWDER, FOR SUSPENSION ORAL at 13:01

## 2022-01-01 RX ADMIN — CHLORHEXIDINE GLUCONATE 15 MILLILITER(S): 213 SOLUTION TOPICAL at 17:16

## 2022-01-01 RX ADMIN — SEVELAMER CARBONATE 1600 MILLIGRAM(S): 2400 POWDER, FOR SUSPENSION ORAL at 18:03

## 2022-01-01 RX ADMIN — Medication 1: at 08:38

## 2022-01-01 RX ADMIN — Medication 25 MILLIGRAM(S): at 06:06

## 2022-01-01 RX ADMIN — LIDOCAINE 1 PATCH: 4 CREAM TOPICAL at 11:43

## 2022-01-01 RX ADMIN — SEVELAMER CARBONATE 1600 MILLIGRAM(S): 2400 POWDER, FOR SUSPENSION ORAL at 12:33

## 2022-01-01 RX ADMIN — POLYETHYLENE GLYCOL 3350 17 GRAM(S): 17 POWDER, FOR SOLUTION ORAL at 05:07

## 2022-01-01 RX ADMIN — Medication 3 MILLILITER(S): at 01:41

## 2022-01-01 RX ADMIN — Medication 90 MILLIGRAM(S): at 05:15

## 2022-01-01 RX ADMIN — Medication 120 MILLIGRAM(S): at 06:28

## 2022-01-01 RX ADMIN — Medication 1 DROP(S): at 09:03

## 2022-01-01 RX ADMIN — ERYTHROPOIETIN 10000 UNIT(S): 10000 INJECTION, SOLUTION INTRAVENOUS; SUBCUTANEOUS at 13:35

## 2022-01-01 RX ADMIN — Medication 22 UNIT(S): at 09:11

## 2022-01-01 RX ADMIN — Medication 100 MILLIGRAM(S): at 12:17

## 2022-01-01 RX ADMIN — APIXABAN 5 MILLIGRAM(S): 2.5 TABLET, FILM COATED ORAL at 18:36

## 2022-01-01 RX ADMIN — CHLORHEXIDINE GLUCONATE 1 APPLICATION(S): 213 SOLUTION TOPICAL at 13:11

## 2022-01-01 RX ADMIN — ATORVASTATIN CALCIUM 40 MILLIGRAM(S): 80 TABLET, FILM COATED ORAL at 22:41

## 2022-01-01 RX ADMIN — Medication 10 UNIT(S): at 18:14

## 2022-01-01 RX ADMIN — Medication 5 MILLIGRAM(S): at 23:18

## 2022-01-01 RX ADMIN — INSULIN GLARGINE 25 UNIT(S): 100 INJECTION, SOLUTION SUBCUTANEOUS at 23:48

## 2022-01-01 RX ADMIN — POLYETHYLENE GLYCOL 3350 17 GRAM(S): 17 POWDER, FOR SOLUTION ORAL at 19:09

## 2022-01-01 RX ADMIN — SENNA PLUS 2 TABLET(S): 8.6 TABLET ORAL at 21:40

## 2022-01-01 RX ADMIN — Medication 100 MILLIGRAM(S): at 12:38

## 2022-01-01 RX ADMIN — Medication 10 UNIT(S): at 13:33

## 2022-01-01 RX ADMIN — Medication 25 MILLIGRAM(S): at 05:18

## 2022-01-01 RX ADMIN — Medication 100 MILLIGRAM(S): at 12:45

## 2022-01-01 RX ADMIN — SEVELAMER CARBONATE 1600 MILLIGRAM(S): 2400 POWDER, FOR SUSPENSION ORAL at 12:50

## 2022-01-01 RX ADMIN — INSULIN GLARGINE 30 UNIT(S): 100 INJECTION, SOLUTION SUBCUTANEOUS at 22:02

## 2022-01-01 RX ADMIN — APIXABAN 5 MILLIGRAM(S): 2.5 TABLET, FILM COATED ORAL at 17:31

## 2022-01-01 RX ADMIN — Medication 2: at 12:53

## 2022-01-01 RX ADMIN — APIXABAN 5 MILLIGRAM(S): 2.5 TABLET, FILM COATED ORAL at 13:09

## 2022-01-01 RX ADMIN — ERYTHROPOIETIN 10000 UNIT(S): 10000 INJECTION, SOLUTION INTRAVENOUS; SUBCUTANEOUS at 19:45

## 2022-01-01 RX ADMIN — LIDOCAINE 1 PATCH: 4 CREAM TOPICAL at 22:22

## 2022-01-01 RX ADMIN — Medication 1 GRAM(S): at 09:23

## 2022-01-01 RX ADMIN — LIDOCAINE 1 PATCH: 4 CREAM TOPICAL at 19:45

## 2022-01-01 RX ADMIN — SEVELAMER CARBONATE 1600 MILLIGRAM(S): 2400 POWDER, FOR SUSPENSION ORAL at 11:33

## 2022-01-01 RX ADMIN — MUPIROCIN 1 APPLICATION(S): 20 OINTMENT TOPICAL at 05:21

## 2022-01-01 RX ADMIN — SENNA PLUS 2 TABLET(S): 8.6 TABLET ORAL at 23:10

## 2022-01-01 RX ADMIN — Medication 25 MILLIGRAM(S): at 13:25

## 2022-01-01 RX ADMIN — LIDOCAINE 1 PATCH: 4 CREAM TOPICAL at 19:35

## 2022-01-01 RX ADMIN — SEVELAMER CARBONATE 1600 MILLIGRAM(S): 2400 POWDER, FOR SUSPENSION ORAL at 11:59

## 2022-01-01 RX ADMIN — POLYETHYLENE GLYCOL 3350 17 GRAM(S): 17 POWDER, FOR SOLUTION ORAL at 17:30

## 2022-01-01 RX ADMIN — PANTOPRAZOLE SODIUM 10 MG/HR: 20 TABLET, DELAYED RELEASE ORAL at 15:18

## 2022-01-01 RX ADMIN — Medication 1 APPLICATION(S): at 13:00

## 2022-01-01 RX ADMIN — SEVELAMER CARBONATE 1600 MILLIGRAM(S): 2400 POWDER, FOR SUSPENSION ORAL at 17:30

## 2022-01-01 RX ADMIN — LIDOCAINE 1 PATCH: 4 CREAM TOPICAL at 12:37

## 2022-01-01 RX ADMIN — HEPARIN SODIUM 1700 UNIT(S)/HR: 5000 INJECTION INTRAVENOUS; SUBCUTANEOUS at 13:20

## 2022-01-01 RX ADMIN — Medication 81 MILLIGRAM(S): at 13:12

## 2022-01-01 RX ADMIN — CHLORHEXIDINE GLUCONATE 1 APPLICATION(S): 213 SOLUTION TOPICAL at 05:50

## 2022-01-01 RX ADMIN — HEPARIN SODIUM 2200 UNIT(S)/HR: 5000 INJECTION INTRAVENOUS; SUBCUTANEOUS at 07:23

## 2022-01-01 RX ADMIN — Medication 25 MILLIGRAM(S): at 05:38

## 2022-01-01 RX ADMIN — Medication 7.5 MILLIGRAM(S): at 17:17

## 2022-01-01 RX ADMIN — ATORVASTATIN CALCIUM 40 MILLIGRAM(S): 80 TABLET, FILM COATED ORAL at 22:00

## 2022-01-01 RX ADMIN — OXYCODONE AND ACETAMINOPHEN 1 TABLET(S): 5; 325 TABLET ORAL at 21:50

## 2022-01-01 RX ADMIN — Medication 25 MILLIGRAM(S): at 21:27

## 2022-01-01 RX ADMIN — Medication 25 MILLIGRAM(S): at 14:44

## 2022-01-01 RX ADMIN — Medication 12.5 MILLIGRAM(S): at 05:15

## 2022-01-01 RX ADMIN — Medication 1 APPLICATION(S): at 11:35

## 2022-01-01 RX ADMIN — CHLORHEXIDINE GLUCONATE 1 APPLICATION(S): 213 SOLUTION TOPICAL at 11:27

## 2022-01-01 RX ADMIN — CHLORHEXIDINE GLUCONATE 15 MILLILITER(S): 213 SOLUTION TOPICAL at 05:50

## 2022-01-01 RX ADMIN — CHLORHEXIDINE GLUCONATE 1 APPLICATION(S): 213 SOLUTION TOPICAL at 15:48

## 2022-01-01 RX ADMIN — Medication 100 MILLIGRAM(S): at 05:37

## 2022-01-01 RX ADMIN — Medication 1 APPLICATION(S): at 17:54

## 2022-01-01 RX ADMIN — Medication 1 DROP(S): at 06:03

## 2022-01-01 RX ADMIN — SEVELAMER CARBONATE 1600 MILLIGRAM(S): 2400 POWDER, FOR SUSPENSION ORAL at 13:34

## 2022-01-01 RX ADMIN — Medication 2.5 MILLIGRAM(S): at 22:39

## 2022-01-01 RX ADMIN — Medication 2: at 08:40

## 2022-01-01 RX ADMIN — Medication 1 APPLICATION(S): at 17:50

## 2022-01-01 RX ADMIN — Medication 120 MILLIGRAM(S): at 06:14

## 2022-01-01 RX ADMIN — Medication 5 MILLIGRAM(S): at 23:49

## 2022-01-01 RX ADMIN — Medication 1: at 08:42

## 2022-01-01 RX ADMIN — DABIGATRAN ETEXILATE MESYLATE 150 MILLIGRAM(S): 150 CAPSULE ORAL at 01:38

## 2022-01-01 RX ADMIN — Medication 81 MILLIGRAM(S): at 12:26

## 2022-01-01 RX ADMIN — Medication 10 MILLIGRAM(S): at 00:54

## 2022-01-01 RX ADMIN — Medication 5 MG/HR: at 20:03

## 2022-01-01 RX ADMIN — Medication 1: at 08:51

## 2022-01-01 RX ADMIN — Medication 180 MILLIGRAM(S): at 05:21

## 2022-01-01 RX ADMIN — SENNA PLUS 2 TABLET(S): 8.6 TABLET ORAL at 22:08

## 2022-01-01 RX ADMIN — ATORVASTATIN CALCIUM 40 MILLIGRAM(S): 80 TABLET, FILM COATED ORAL at 01:28

## 2022-01-01 RX ADMIN — Medication 2: at 12:21

## 2022-01-01 RX ADMIN — HEPARIN SODIUM 2700 UNIT(S)/HR: 5000 INJECTION INTRAVENOUS; SUBCUTANEOUS at 08:17

## 2022-01-01 RX ADMIN — Medication 1 DROP(S): at 06:42

## 2022-01-01 RX ADMIN — APIXABAN 5 MILLIGRAM(S): 2.5 TABLET, FILM COATED ORAL at 18:04

## 2022-01-01 RX ADMIN — APIXABAN 5 MILLIGRAM(S): 2.5 TABLET, FILM COATED ORAL at 22:20

## 2022-01-01 RX ADMIN — SEVELAMER CARBONATE 1600 MILLIGRAM(S): 2400 POWDER, FOR SUSPENSION ORAL at 13:41

## 2022-01-01 RX ADMIN — APIXABAN 5 MILLIGRAM(S): 2.5 TABLET, FILM COATED ORAL at 05:47

## 2022-01-01 RX ADMIN — HEPARIN SODIUM 42 UNIT(S)/HR: 5000 INJECTION INTRAVENOUS; SUBCUTANEOUS at 08:43

## 2022-01-01 RX ADMIN — SEVELAMER CARBONATE 1600 MILLIGRAM(S): 2400 POWDER, FOR SUSPENSION ORAL at 11:40

## 2022-01-01 RX ADMIN — POLYETHYLENE GLYCOL 3350 17 GRAM(S): 17 POWDER, FOR SOLUTION ORAL at 18:07

## 2022-01-01 RX ADMIN — Medication 100 MILLIGRAM(S): at 07:38

## 2022-01-01 RX ADMIN — Medication 100 MILLIGRAM(S): at 06:02

## 2022-01-01 RX ADMIN — Medication 1: at 08:27

## 2022-01-01 RX ADMIN — Medication 1 APPLICATION(S): at 12:18

## 2022-01-01 RX ADMIN — Medication 1 DROP(S): at 18:07

## 2022-01-01 RX ADMIN — SEVELAMER CARBONATE 1600 MILLIGRAM(S): 2400 POWDER, FOR SUSPENSION ORAL at 07:56

## 2022-01-01 RX ADMIN — Medication 1 APPLICATION(S): at 14:33

## 2022-01-01 RX ADMIN — Medication 2: at 12:40

## 2022-01-01 RX ADMIN — LIDOCAINE 1 PATCH: 4 CREAM TOPICAL at 23:00

## 2022-01-01 RX ADMIN — Medication 240 MILLIGRAM(S): at 05:40

## 2022-01-01 RX ADMIN — SENNA PLUS 2 TABLET(S): 8.6 TABLET ORAL at 03:03

## 2022-01-01 RX ADMIN — SEVELAMER CARBONATE 1600 MILLIGRAM(S): 2400 POWDER, FOR SUSPENSION ORAL at 12:29

## 2022-01-01 RX ADMIN — Medication 15 UNIT(S): at 09:01

## 2022-01-01 RX ADMIN — Medication 100 MILLIGRAM(S): at 05:14

## 2022-01-01 RX ADMIN — Medication 100 MILLIGRAM(S): at 08:38

## 2022-01-01 RX ADMIN — CHLORHEXIDINE GLUCONATE 1 APPLICATION(S): 213 SOLUTION TOPICAL at 12:00

## 2022-01-01 RX ADMIN — Medication 1 DROP(S): at 17:54

## 2022-01-01 RX ADMIN — Medication 1 TABLET(S): at 11:34

## 2022-01-01 RX ADMIN — SEVELAMER CARBONATE 1600 MILLIGRAM(S): 2400 POWDER, FOR SUSPENSION ORAL at 13:43

## 2022-01-01 RX ADMIN — Medication 120 MILLIGRAM(S): at 22:52

## 2022-01-01 RX ADMIN — Medication 100 MILLIGRAM(S): at 13:01

## 2022-01-01 RX ADMIN — CHLORHEXIDINE GLUCONATE 1 APPLICATION(S): 213 SOLUTION TOPICAL at 08:12

## 2022-01-01 RX ADMIN — Medication 25 MILLIGRAM(S): at 13:43

## 2022-01-01 RX ADMIN — POLYETHYLENE GLYCOL 3350 17 GRAM(S): 17 POWDER, FOR SOLUTION ORAL at 06:28

## 2022-01-01 RX ADMIN — HEPARIN SODIUM 2700 UNIT(S)/HR: 5000 INJECTION INTRAVENOUS; SUBCUTANEOUS at 19:28

## 2022-01-01 RX ADMIN — Medication 4: at 05:51

## 2022-01-01 RX ADMIN — Medication 7.5 MILLIGRAM(S): at 17:27

## 2022-01-01 RX ADMIN — CLOPIDOGREL BISULFATE 75 MILLIGRAM(S): 75 TABLET, FILM COATED ORAL at 10:34

## 2022-01-01 RX ADMIN — Medication 25 MILLIGRAM(S): at 23:34

## 2022-01-01 RX ADMIN — INSULIN GLARGINE 25 UNIT(S): 100 INJECTION, SOLUTION SUBCUTANEOUS at 21:40

## 2022-01-01 RX ADMIN — Medication 120 MILLIGRAM(S): at 05:35

## 2022-01-01 RX ADMIN — SEVELAMER CARBONATE 1600 MILLIGRAM(S): 2400 POWDER, FOR SUSPENSION ORAL at 11:43

## 2022-01-01 RX ADMIN — CHLORHEXIDINE GLUCONATE 1 APPLICATION(S): 213 SOLUTION TOPICAL at 08:30

## 2022-01-01 RX ADMIN — Medication 1 APPLICATION(S): at 12:41

## 2022-01-01 RX ADMIN — CHLORHEXIDINE GLUCONATE 1 APPLICATION(S): 213 SOLUTION TOPICAL at 12:19

## 2022-01-01 RX ADMIN — OXYCODONE AND ACETAMINOPHEN 1 TABLET(S): 5; 325 TABLET ORAL at 10:30

## 2022-01-01 RX ADMIN — Medication 100 MILLIGRAM(S): at 16:49

## 2022-01-01 RX ADMIN — Medication 2: at 12:30

## 2022-01-01 RX ADMIN — LIDOCAINE 1 PATCH: 4 CREAM TOPICAL at 12:49

## 2022-01-01 RX ADMIN — LIDOCAINE 1 PATCH: 4 CREAM TOPICAL at 20:09

## 2022-01-01 RX ADMIN — Medication 25 MILLIGRAM(S): at 14:32

## 2022-01-01 RX ADMIN — Medication 100 MILLIGRAM(S): at 13:12

## 2022-01-01 RX ADMIN — APIXABAN 5 MILLIGRAM(S): 2.5 TABLET, FILM COATED ORAL at 17:59

## 2022-01-01 RX ADMIN — HUMAN INSULIN 4 UNIT(S): 100 INJECTION, SUSPENSION SUBCUTANEOUS at 17:55

## 2022-01-01 RX ADMIN — Medication 100 MILLIGRAM(S): at 13:00

## 2022-01-01 RX ADMIN — POLYETHYLENE GLYCOL 3350 17 GRAM(S): 17 POWDER, FOR SOLUTION ORAL at 06:48

## 2022-01-01 RX ADMIN — HEPARIN SODIUM 3800 UNIT(S)/HR: 5000 INJECTION INTRAVENOUS; SUBCUTANEOUS at 18:59

## 2022-01-01 RX ADMIN — Medication 10 UNIT(S): at 14:02

## 2022-01-01 RX ADMIN — HEPARIN SODIUM 5000 UNIT(S): 5000 INJECTION INTRAVENOUS; SUBCUTANEOUS at 09:35

## 2022-01-01 RX ADMIN — Medication 100 MILLIGRAM(S): at 05:36

## 2022-01-01 RX ADMIN — Medication 1 ENEMA: at 18:34

## 2022-01-01 RX ADMIN — Medication 25 MILLIGRAM(S): at 17:12

## 2022-01-01 RX ADMIN — HEPARIN SODIUM 2700 UNIT(S)/HR: 5000 INJECTION INTRAVENOUS; SUBCUTANEOUS at 03:12

## 2022-01-01 RX ADMIN — Medication 1: at 18:18

## 2022-01-01 RX ADMIN — APIXABAN 5 MILLIGRAM(S): 2.5 TABLET, FILM COATED ORAL at 01:45

## 2022-01-01 RX ADMIN — ERYTHROPOIETIN 20000 UNIT(S): 10000 INJECTION, SOLUTION INTRAVENOUS; SUBCUTANEOUS at 10:20

## 2022-01-01 RX ADMIN — OXYCODONE AND ACETAMINOPHEN 1 TABLET(S): 5; 325 TABLET ORAL at 05:44

## 2022-01-01 RX ADMIN — OXYCODONE AND ACETAMINOPHEN 2 TABLET(S): 5; 325 TABLET ORAL at 13:06

## 2022-01-01 RX ADMIN — Medication 2: at 17:20

## 2022-01-01 RX ADMIN — Medication 12 UNIT(S): at 12:46

## 2022-01-01 RX ADMIN — Medication 100 MILLIGRAM(S): at 06:43

## 2022-01-01 RX ADMIN — HEPARIN SODIUM 3500 UNIT(S)/HR: 5000 INJECTION INTRAVENOUS; SUBCUTANEOUS at 01:45

## 2022-01-01 RX ADMIN — SEVELAMER CARBONATE 1600 MILLIGRAM(S): 2400 POWDER, FOR SUSPENSION ORAL at 11:58

## 2022-01-01 RX ADMIN — QUETIAPINE FUMARATE 25 MILLIGRAM(S): 200 TABLET, FILM COATED ORAL at 21:50

## 2022-01-01 RX ADMIN — Medication 25 MILLIGRAM(S): at 14:18

## 2022-01-01 RX ADMIN — LIDOCAINE 1 PATCH: 4 CREAM TOPICAL at 11:24

## 2022-01-01 RX ADMIN — CLOPIDOGREL BISULFATE 600 MILLIGRAM(S): 75 TABLET, FILM COATED ORAL at 12:12

## 2022-01-01 RX ADMIN — Medication 3 MILLILITER(S): at 06:19

## 2022-01-01 RX ADMIN — CHLORHEXIDINE GLUCONATE 15 MILLILITER(S): 213 SOLUTION TOPICAL at 05:41

## 2022-01-01 RX ADMIN — Medication 1 TABLET(S): at 12:58

## 2022-01-01 RX ADMIN — Medication 3: at 12:19

## 2022-01-01 RX ADMIN — Medication 100 MILLIGRAM(S): at 13:25

## 2022-01-01 RX ADMIN — CHLORHEXIDINE GLUCONATE 1 APPLICATION(S): 213 SOLUTION TOPICAL at 08:14

## 2022-01-01 RX ADMIN — SEVELAMER CARBONATE 1600 MILLIGRAM(S): 2400 POWDER, FOR SUSPENSION ORAL at 08:31

## 2022-01-01 RX ADMIN — HEPARIN SODIUM 2200 UNIT(S)/HR: 5000 INJECTION INTRAVENOUS; SUBCUTANEOUS at 01:15

## 2022-01-01 RX ADMIN — LIDOCAINE 1 PATCH: 4 CREAM TOPICAL at 23:48

## 2022-01-01 RX ADMIN — LACTULOSE 20 GRAM(S): 10 SOLUTION ORAL at 15:08

## 2022-01-01 RX ADMIN — Medication 25 MILLIGRAM(S): at 11:56

## 2022-01-01 RX ADMIN — SEVELAMER CARBONATE 1600 MILLIGRAM(S): 2400 POWDER, FOR SUSPENSION ORAL at 08:44

## 2022-01-01 RX ADMIN — APIXABAN 5 MILLIGRAM(S): 2.5 TABLET, FILM COATED ORAL at 15:17

## 2022-01-01 RX ADMIN — HEPARIN SODIUM 2400 UNIT(S)/HR: 5000 INJECTION INTRAVENOUS; SUBCUTANEOUS at 15:53

## 2022-01-01 RX ADMIN — Medication 3: at 08:39

## 2022-01-01 RX ADMIN — Medication 1 TABLET(S): at 11:30

## 2022-01-01 RX ADMIN — PHENYLEPHRINE HYDROCHLORIDE 22.7 MICROGRAM(S)/KG/MIN: 10 INJECTION INTRAVENOUS at 19:34

## 2022-01-01 RX ADMIN — Medication 1 DROP(S): at 05:42

## 2022-01-01 RX ADMIN — LIDOCAINE 1 PATCH: 4 CREAM TOPICAL at 00:04

## 2022-01-01 RX ADMIN — Medication 10 MILLIGRAM(S): at 16:23

## 2022-01-01 RX ADMIN — HYDROMORPHONE HYDROCHLORIDE 0.5 MILLIGRAM(S): 2 INJECTION INTRAMUSCULAR; INTRAVENOUS; SUBCUTANEOUS at 13:05

## 2022-01-01 RX ADMIN — Medication 2: at 12:55

## 2022-01-01 RX ADMIN — APIXABAN 2.5 MILLIGRAM(S): 2.5 TABLET, FILM COATED ORAL at 06:01

## 2022-01-01 RX ADMIN — SEVELAMER CARBONATE 1600 MILLIGRAM(S): 2400 POWDER, FOR SUSPENSION ORAL at 09:08

## 2022-01-01 RX ADMIN — Medication 25 GRAM(S): at 03:20

## 2022-01-01 RX ADMIN — Medication 2: at 17:08

## 2022-01-01 RX ADMIN — LIDOCAINE 1 PATCH: 4 CREAM TOPICAL at 12:01

## 2022-01-01 RX ADMIN — Medication 100 MILLIGRAM(S): at 21:50

## 2022-01-01 RX ADMIN — LIDOCAINE 1 PATCH: 4 CREAM TOPICAL at 20:12

## 2022-01-01 RX ADMIN — SEVELAMER CARBONATE 1600 MILLIGRAM(S): 2400 POWDER, FOR SUSPENSION ORAL at 17:54

## 2022-01-01 RX ADMIN — QUETIAPINE FUMARATE 25 MILLIGRAM(S): 200 TABLET, FILM COATED ORAL at 21:15

## 2022-01-01 RX ADMIN — Medication 25 MILLIGRAM(S): at 21:50

## 2022-01-01 RX ADMIN — Medication 25 MILLIGRAM(S): at 05:24

## 2022-01-01 RX ADMIN — APIXABAN 5 MILLIGRAM(S): 2.5 TABLET, FILM COATED ORAL at 02:13

## 2022-01-01 RX ADMIN — IRON SUCROSE 100 MILLIGRAM(S): 20 INJECTION, SOLUTION INTRAVENOUS at 15:44

## 2022-01-01 RX ADMIN — LIDOCAINE 1 PATCH: 4 CREAM TOPICAL at 12:27

## 2022-01-01 RX ADMIN — Medication 1 DROP(S): at 00:00

## 2022-01-01 RX ADMIN — Medication 4: at 00:09

## 2022-01-01 RX ADMIN — Medication 100 MILLIGRAM(S): at 05:20

## 2022-01-01 RX ADMIN — Medication 25 MILLIGRAM(S): at 01:28

## 2022-01-01 RX ADMIN — CHLORHEXIDINE GLUCONATE 1 APPLICATION(S): 213 SOLUTION TOPICAL at 06:30

## 2022-01-01 RX ADMIN — SENNA PLUS 2 TABLET(S): 8.6 TABLET ORAL at 21:12

## 2022-01-01 RX ADMIN — SEVELAMER CARBONATE 1600 MILLIGRAM(S): 2400 POWDER, FOR SUSPENSION ORAL at 08:28

## 2022-01-01 RX ADMIN — SEVELAMER CARBONATE 1600 MILLIGRAM(S): 2400 POWDER, FOR SUSPENSION ORAL at 17:58

## 2022-01-01 RX ADMIN — Medication 2: at 12:45

## 2022-01-01 RX ADMIN — POLYETHYLENE GLYCOL 3350 17 GRAM(S): 17 POWDER, FOR SOLUTION ORAL at 05:47

## 2022-01-01 RX ADMIN — Medication 2: at 17:26

## 2022-01-01 RX ADMIN — SEVELAMER CARBONATE 1600 MILLIGRAM(S): 2400 POWDER, FOR SUSPENSION ORAL at 12:17

## 2022-01-01 RX ADMIN — Medication 1 DROP(S): at 12:50

## 2022-01-01 RX ADMIN — SEVELAMER CARBONATE 1600 MILLIGRAM(S): 2400 POWDER, FOR SUSPENSION ORAL at 13:24

## 2022-01-01 RX ADMIN — CHLORHEXIDINE GLUCONATE 1 APPLICATION(S): 213 SOLUTION TOPICAL at 06:38

## 2022-01-01 RX ADMIN — Medication 1 TABLET(S): at 12:45

## 2022-01-01 RX ADMIN — Medication 81 MILLIGRAM(S): at 11:32

## 2022-01-01 RX ADMIN — Medication 100 MILLIGRAM(S): at 23:38

## 2022-01-01 RX ADMIN — POLYETHYLENE GLYCOL 3350 17 GRAM(S): 17 POWDER, FOR SOLUTION ORAL at 17:55

## 2022-01-01 RX ADMIN — Medication 20 UNIT(S): at 08:49

## 2022-01-01 RX ADMIN — Medication 10 MILLIGRAM(S): at 17:07

## 2022-01-01 RX ADMIN — Medication 100 MILLIGRAM(S): at 09:58

## 2022-01-01 RX ADMIN — Medication 100 MILLIGRAM(S): at 22:40

## 2022-01-01 RX ADMIN — Medication 1: at 14:39

## 2022-01-01 RX ADMIN — Medication 81 MILLIGRAM(S): at 12:02

## 2022-01-01 RX ADMIN — CHLORHEXIDINE GLUCONATE 1 APPLICATION(S): 213 SOLUTION TOPICAL at 11:44

## 2022-01-01 RX ADMIN — Medication 100 MILLIGRAM(S): at 17:15

## 2022-01-01 RX ADMIN — Medication 3 MILLILITER(S): at 17:13

## 2022-01-01 RX ADMIN — Medication 1: at 08:55

## 2022-01-01 RX ADMIN — ATORVASTATIN CALCIUM 40 MILLIGRAM(S): 80 TABLET, FILM COATED ORAL at 21:02

## 2022-01-01 RX ADMIN — ERYTHROPOIETIN 10000 UNIT(S): 10000 INJECTION, SOLUTION INTRAVENOUS; SUBCUTANEOUS at 18:59

## 2022-01-01 RX ADMIN — Medication 1 APPLICATION(S): at 06:03

## 2022-01-01 RX ADMIN — Medication 25 MILLIGRAM(S): at 13:38

## 2022-01-01 RX ADMIN — SEVELAMER CARBONATE 1600 MILLIGRAM(S): 2400 POWDER, FOR SUSPENSION ORAL at 09:17

## 2022-01-01 RX ADMIN — Medication 1: at 08:44

## 2022-01-01 RX ADMIN — CHLORHEXIDINE GLUCONATE 1 APPLICATION(S): 213 SOLUTION TOPICAL at 08:51

## 2022-01-01 RX ADMIN — SENNA PLUS 2 TABLET(S): 8.6 TABLET ORAL at 21:18

## 2022-01-01 RX ADMIN — HEPARIN SODIUM 2200 UNIT(S)/HR: 5000 INJECTION INTRAVENOUS; SUBCUTANEOUS at 09:49

## 2022-01-01 RX ADMIN — Medication 100 MILLIGRAM(S): at 05:44

## 2022-01-01 RX ADMIN — Medication 10 MILLIGRAM(S): at 05:13

## 2022-01-01 RX ADMIN — Medication 1: at 08:46

## 2022-01-01 RX ADMIN — SODIUM CHLORIDE 4 MILLILITER(S): 9 INJECTION INTRAMUSCULAR; INTRAVENOUS; SUBCUTANEOUS at 23:54

## 2022-01-01 RX ADMIN — CHLORHEXIDINE GLUCONATE 1 APPLICATION(S): 213 SOLUTION TOPICAL at 12:43

## 2022-01-01 RX ADMIN — Medication 10 MG/HR: at 16:41

## 2022-01-01 RX ADMIN — HEPARIN SODIUM 5000 UNIT(S): 5000 INJECTION INTRAVENOUS; SUBCUTANEOUS at 09:26

## 2022-01-01 RX ADMIN — APIXABAN 5 MILLIGRAM(S): 2.5 TABLET, FILM COATED ORAL at 17:16

## 2022-01-01 RX ADMIN — Medication 1 TABLET(S): at 12:41

## 2022-01-01 RX ADMIN — Medication 81 MILLIGRAM(S): at 14:02

## 2022-01-01 RX ADMIN — Medication 25 MILLIGRAM(S): at 13:34

## 2022-01-01 RX ADMIN — Medication 100 MILLIGRAM(S): at 12:51

## 2022-01-01 RX ADMIN — CHLORHEXIDINE GLUCONATE 1 APPLICATION(S): 213 SOLUTION TOPICAL at 14:40

## 2022-01-01 RX ADMIN — QUETIAPINE FUMARATE 25 MILLIGRAM(S): 200 TABLET, FILM COATED ORAL at 22:20

## 2022-01-01 RX ADMIN — LIDOCAINE 1 PATCH: 4 CREAM TOPICAL at 20:30

## 2022-01-01 RX ADMIN — LIDOCAINE 1 PATCH: 4 CREAM TOPICAL at 11:29

## 2022-01-01 RX ADMIN — HYDROMORPHONE HYDROCHLORIDE 0.5 MILLIGRAM(S): 2 INJECTION INTRAMUSCULAR; INTRAVENOUS; SUBCUTANEOUS at 13:35

## 2022-01-01 RX ADMIN — Medication 25 MILLIGRAM(S): at 16:38

## 2022-01-01 RX ADMIN — Medication 1: at 16:38

## 2022-01-01 RX ADMIN — Medication 100 MILLIGRAM(S): at 05:47

## 2022-01-01 RX ADMIN — SEVELAMER CARBONATE 1600 MILLIGRAM(S): 2400 POWDER, FOR SUSPENSION ORAL at 12:31

## 2022-01-01 RX ADMIN — LIDOCAINE 1 PATCH: 4 CREAM TOPICAL at 19:50

## 2022-01-01 RX ADMIN — Medication 1: at 17:37

## 2022-01-01 RX ADMIN — HEPARIN SODIUM 42 UNIT(S)/HR: 5000 INJECTION INTRAVENOUS; SUBCUTANEOUS at 08:53

## 2022-01-01 RX ADMIN — HEPARIN SODIUM 3000 UNIT(S)/HR: 5000 INJECTION INTRAVENOUS; SUBCUTANEOUS at 19:33

## 2022-01-01 RX ADMIN — SEVELAMER CARBONATE 1600 MILLIGRAM(S): 2400 POWDER, FOR SUSPENSION ORAL at 08:25

## 2022-01-01 RX ADMIN — Medication 100 MILLIGRAM(S): at 06:40

## 2022-01-01 RX ADMIN — HEPARIN SODIUM 2200 UNIT(S)/HR: 5000 INJECTION INTRAVENOUS; SUBCUTANEOUS at 19:11

## 2022-01-01 RX ADMIN — Medication 85 MILLIMOLE(S): at 05:40

## 2022-01-01 RX ADMIN — APIXABAN 2.5 MILLIGRAM(S): 2.5 TABLET, FILM COATED ORAL at 05:39

## 2022-01-01 RX ADMIN — Medication 100 MILLIGRAM(S): at 05:04

## 2022-01-01 RX ADMIN — Medication 1 APPLICATION(S): at 13:10

## 2022-01-01 RX ADMIN — OXYCODONE AND ACETAMINOPHEN 1 TABLET(S): 5; 325 TABLET ORAL at 10:39

## 2022-01-01 RX ADMIN — Medication 240 MILLIGRAM(S): at 05:47

## 2022-01-01 RX ADMIN — Medication 6: at 17:57

## 2022-01-01 RX ADMIN — Medication 100 MILLIGRAM(S): at 12:13

## 2022-01-01 RX ADMIN — Medication 100 MILLIGRAM(S): at 12:08

## 2022-01-01 RX ADMIN — Medication 81 MILLIGRAM(S): at 13:10

## 2022-01-01 RX ADMIN — Medication 120 MILLIGRAM(S): at 07:37

## 2022-01-01 RX ADMIN — HYDROMORPHONE HYDROCHLORIDE 1 MILLIGRAM(S): 2 INJECTION INTRAMUSCULAR; INTRAVENOUS; SUBCUTANEOUS at 05:51

## 2022-01-01 RX ADMIN — Medication 1: at 08:58

## 2022-01-01 RX ADMIN — CHLORHEXIDINE GLUCONATE 1 APPLICATION(S): 213 SOLUTION TOPICAL at 05:59

## 2022-01-01 RX ADMIN — Medication 7.5 MILLIGRAM(S): at 05:20

## 2022-01-01 RX ADMIN — Medication 25 MILLIGRAM(S): at 05:52

## 2022-01-01 RX ADMIN — Medication 650 MILLIGRAM(S): at 05:42

## 2022-01-01 RX ADMIN — Medication 1: at 11:57

## 2022-01-01 RX ADMIN — Medication 1 PACKET(S): at 12:13

## 2022-01-01 RX ADMIN — APIXABAN 5 MILLIGRAM(S): 2.5 TABLET, FILM COATED ORAL at 13:11

## 2022-01-01 RX ADMIN — Medication 100 MILLIGRAM(S): at 11:54

## 2022-01-01 RX ADMIN — Medication 120 MILLIGRAM(S): at 05:55

## 2022-01-01 RX ADMIN — Medication 120 MILLIGRAM(S): at 05:25

## 2022-01-01 RX ADMIN — LIDOCAINE 1 PATCH: 4 CREAM TOPICAL at 01:12

## 2022-01-01 RX ADMIN — Medication 7.5 MILLIGRAM(S): at 00:39

## 2022-01-01 RX ADMIN — Medication 25 MILLIGRAM(S): at 14:37

## 2022-01-01 RX ADMIN — LIDOCAINE 1 PATCH: 4 CREAM TOPICAL at 09:59

## 2022-01-01 RX ADMIN — Medication 400 MILLIGRAM(S): at 22:40

## 2022-01-01 RX ADMIN — CHLORHEXIDINE GLUCONATE 1 APPLICATION(S): 213 SOLUTION TOPICAL at 17:14

## 2022-01-01 RX ADMIN — Medication 100 MILLIGRAM(S): at 06:51

## 2022-01-01 RX ADMIN — QUETIAPINE FUMARATE 25 MILLIGRAM(S): 200 TABLET, FILM COATED ORAL at 21:34

## 2022-01-01 RX ADMIN — Medication 25 MILLIGRAM(S): at 21:34

## 2022-01-01 RX ADMIN — Medication 100 MILLIGRAM(S): at 05:40

## 2022-01-01 RX ADMIN — SEVELAMER CARBONATE 1600 MILLIGRAM(S): 2400 POWDER, FOR SUSPENSION ORAL at 17:34

## 2022-01-01 RX ADMIN — ATORVASTATIN CALCIUM 40 MILLIGRAM(S): 80 TABLET, FILM COATED ORAL at 21:17

## 2022-01-01 RX ADMIN — APIXABAN 5 MILLIGRAM(S): 2.5 TABLET, FILM COATED ORAL at 17:44

## 2022-01-01 RX ADMIN — Medication 1 TABLET(S): at 12:26

## 2022-01-01 RX ADMIN — APIXABAN 5 MILLIGRAM(S): 2.5 TABLET, FILM COATED ORAL at 06:35

## 2022-01-01 RX ADMIN — Medication 2: at 12:31

## 2022-01-01 RX ADMIN — Medication 1 GRAM(S): at 18:07

## 2022-01-01 RX ADMIN — HEPARIN SODIUM 3000 UNIT(S)/HR: 5000 INJECTION INTRAVENOUS; SUBCUTANEOUS at 17:32

## 2022-01-01 RX ADMIN — Medication 10 UNIT(S): at 09:09

## 2022-01-01 RX ADMIN — Medication 100 MILLIGRAM(S): at 12:26

## 2022-01-01 RX ADMIN — Medication 5 MILLIGRAM(S): at 05:40

## 2022-01-01 RX ADMIN — Medication 1 APPLICATION(S): at 13:04

## 2022-01-01 RX ADMIN — ERYTHROPOIETIN 20000 UNIT(S): 10000 INJECTION, SOLUTION INTRAVENOUS; SUBCUTANEOUS at 17:43

## 2022-01-01 RX ADMIN — FENTANYL CITRATE 25 MICROGRAM(S): 50 INJECTION INTRAVENOUS at 00:34

## 2022-01-01 RX ADMIN — ATORVASTATIN CALCIUM 40 MILLIGRAM(S): 80 TABLET, FILM COATED ORAL at 22:30

## 2022-01-01 RX ADMIN — HEPARIN SODIUM 2200 UNIT(S)/HR: 5000 INJECTION INTRAVENOUS; SUBCUTANEOUS at 07:47

## 2022-01-01 RX ADMIN — Medication 25 MILLIGRAM(S): at 05:36

## 2022-01-01 RX ADMIN — APIXABAN 5 MILLIGRAM(S): 2.5 TABLET, FILM COATED ORAL at 17:46

## 2022-01-01 RX ADMIN — Medication 5 MILLIGRAM(S): at 04:52

## 2022-01-01 RX ADMIN — Medication 1: at 17:17

## 2022-01-01 RX ADMIN — Medication 100 MILLIGRAM(S): at 06:27

## 2022-01-01 RX ADMIN — Medication 25 MILLIGRAM(S): at 21:15

## 2022-01-01 RX ADMIN — Medication 100 MILLIGRAM(S): at 16:23

## 2022-01-01 RX ADMIN — CHLORHEXIDINE GLUCONATE 15 MILLILITER(S): 213 SOLUTION TOPICAL at 05:14

## 2022-01-01 RX ADMIN — Medication 180 MILLIGRAM(S): at 06:35

## 2022-01-01 RX ADMIN — SEVELAMER CARBONATE 1600 MILLIGRAM(S): 2400 POWDER, FOR SUSPENSION ORAL at 11:54

## 2022-01-01 RX ADMIN — HEPARIN SODIUM 2200 UNIT(S)/HR: 5000 INJECTION INTRAVENOUS; SUBCUTANEOUS at 12:30

## 2022-01-01 RX ADMIN — ERYTHROPOIETIN 10000 UNIT(S): 10000 INJECTION, SOLUTION INTRAVENOUS; SUBCUTANEOUS at 10:35

## 2022-01-01 RX ADMIN — Medication 1 GRAM(S): at 05:23

## 2022-01-01 RX ADMIN — Medication 25 MILLIGRAM(S): at 05:47

## 2022-01-01 RX ADMIN — ATORVASTATIN CALCIUM 40 MILLIGRAM(S): 80 TABLET, FILM COATED ORAL at 22:19

## 2022-01-01 RX ADMIN — Medication 1: at 11:59

## 2022-01-01 RX ADMIN — ATORVASTATIN CALCIUM 40 MILLIGRAM(S): 80 TABLET, FILM COATED ORAL at 22:12

## 2022-01-01 RX ADMIN — MIDAZOLAM HYDROCHLORIDE 5 MILLIGRAM(S): 1 INJECTION, SOLUTION INTRAMUSCULAR; INTRAVENOUS at 12:40

## 2022-01-01 RX ADMIN — HEPARIN SODIUM 2200 UNIT(S)/HR: 5000 INJECTION INTRAVENOUS; SUBCUTANEOUS at 03:06

## 2022-01-01 RX ADMIN — POLYETHYLENE GLYCOL 3350 17 GRAM(S): 17 POWDER, FOR SOLUTION ORAL at 22:41

## 2022-01-01 RX ADMIN — HEPARIN SODIUM 1600 UNIT(S)/HR: 5000 INJECTION INTRAVENOUS; SUBCUTANEOUS at 19:16

## 2022-01-01 RX ADMIN — Medication 120 MILLIGRAM(S): at 05:40

## 2022-01-01 RX ADMIN — Medication 4: at 17:51

## 2022-01-01 RX ADMIN — CHLORHEXIDINE GLUCONATE 1 APPLICATION(S): 213 SOLUTION TOPICAL at 06:09

## 2022-01-01 RX ADMIN — Medication 5 MILLIGRAM(S): at 02:15

## 2022-01-01 RX ADMIN — POLYETHYLENE GLYCOL 3350 17 GRAM(S): 17 POWDER, FOR SOLUTION ORAL at 05:20

## 2022-01-01 RX ADMIN — Medication 25 MILLIGRAM(S): at 05:10

## 2022-01-01 RX ADMIN — CHLORHEXIDINE GLUCONATE 1 APPLICATION(S): 213 SOLUTION TOPICAL at 05:54

## 2022-01-01 RX ADMIN — Medication 1 GRAM(S): at 09:45

## 2022-01-01 RX ADMIN — Medication 120 MILLIGRAM(S): at 05:21

## 2022-01-01 RX ADMIN — ATORVASTATIN CALCIUM 40 MILLIGRAM(S): 80 TABLET, FILM COATED ORAL at 23:36

## 2022-01-01 RX ADMIN — Medication 0: at 21:27

## 2022-01-01 RX ADMIN — ATORVASTATIN CALCIUM 40 MILLIGRAM(S): 80 TABLET, FILM COATED ORAL at 22:03

## 2022-01-01 RX ADMIN — LIDOCAINE 1 PATCH: 4 CREAM TOPICAL at 00:00

## 2022-01-01 RX ADMIN — SENNA PLUS 2 TABLET(S): 8.6 TABLET ORAL at 22:41

## 2022-01-01 RX ADMIN — OXYCODONE AND ACETAMINOPHEN 1 TABLET(S): 5; 325 TABLET ORAL at 09:43

## 2022-01-01 RX ADMIN — Medication 1: at 09:02

## 2022-01-01 RX ADMIN — Medication 3: at 13:08

## 2022-01-01 RX ADMIN — Medication 100 MILLIGRAM(S): at 05:08

## 2022-01-01 RX ADMIN — CLOPIDOGREL BISULFATE 75 MILLIGRAM(S): 75 TABLET, FILM COATED ORAL at 12:46

## 2022-01-01 RX ADMIN — SEVELAMER CARBONATE 1600 MILLIGRAM(S): 2400 POWDER, FOR SUSPENSION ORAL at 09:18

## 2022-01-01 RX ADMIN — LIDOCAINE 1 PATCH: 4 CREAM TOPICAL at 18:02

## 2022-01-01 RX ADMIN — CHLORHEXIDINE GLUCONATE 1 APPLICATION(S): 213 SOLUTION TOPICAL at 05:16

## 2022-01-01 RX ADMIN — Medication 100 MILLIGRAM(S): at 06:46

## 2022-01-01 RX ADMIN — Medication 2: at 06:02

## 2022-01-01 RX ADMIN — PANTOPRAZOLE SODIUM 40 MILLIGRAM(S): 20 TABLET, DELAYED RELEASE ORAL at 12:27

## 2022-01-01 RX ADMIN — POLYETHYLENE GLYCOL 3350 17 GRAM(S): 17 POWDER, FOR SOLUTION ORAL at 17:21

## 2022-01-01 RX ADMIN — Medication 25 MILLIGRAM(S): at 18:12

## 2022-01-01 RX ADMIN — MUPIROCIN 1 APPLICATION(S): 20 OINTMENT TOPICAL at 05:30

## 2022-01-01 RX ADMIN — Medication 100 GRAM(S): at 00:47

## 2022-01-01 RX ADMIN — Medication 100 MILLIGRAM(S): at 13:19

## 2022-01-01 RX ADMIN — OXYCODONE AND ACETAMINOPHEN 1 TABLET(S): 5; 325 TABLET ORAL at 22:53

## 2022-01-01 RX ADMIN — Medication 4: at 12:46

## 2022-01-01 RX ADMIN — HUMAN INSULIN 10 UNIT(S): 100 INJECTION, SUSPENSION SUBCUTANEOUS at 23:32

## 2022-01-01 RX ADMIN — SEVELAMER CARBONATE 1600 MILLIGRAM(S): 2400 POWDER, FOR SUSPENSION ORAL at 18:29

## 2022-01-01 RX ADMIN — Medication 100 MILLIGRAM(S): at 05:24

## 2022-01-01 RX ADMIN — Medication 100 MILLIGRAM(S): at 06:14

## 2022-01-01 RX ADMIN — Medication 100 MILLIGRAM(S): at 09:49

## 2022-01-01 RX ADMIN — Medication 100 MILLIGRAM(S): at 12:59

## 2022-01-01 RX ADMIN — Medication 1: at 08:50

## 2022-01-01 RX ADMIN — Medication 81 MILLIGRAM(S): at 12:59

## 2022-01-01 RX ADMIN — Medication 1 GRAM(S): at 05:51

## 2022-01-01 RX ADMIN — Medication 100 MILLIGRAM(S): at 10:33

## 2022-01-01 RX ADMIN — Medication 1 APPLICATION(S): at 12:26

## 2022-01-01 RX ADMIN — LIDOCAINE 1 PATCH: 4 CREAM TOPICAL at 23:19

## 2022-01-01 RX ADMIN — Medication 1: at 12:38

## 2022-01-01 RX ADMIN — Medication 100 MILLIGRAM(S): at 18:03

## 2022-01-01 RX ADMIN — Medication 1: at 17:16

## 2022-01-01 RX ADMIN — MUPIROCIN 1 APPLICATION(S): 20 OINTMENT TOPICAL at 18:02

## 2022-01-01 RX ADMIN — Medication 100 MILLIGRAM(S): at 05:53

## 2022-01-01 RX ADMIN — Medication 1 APPLICATION(S): at 12:59

## 2022-01-01 RX ADMIN — Medication 2.5 MILLIGRAM(S): at 12:54

## 2022-01-01 RX ADMIN — SENNA PLUS 2 TABLET(S): 8.6 TABLET ORAL at 22:06

## 2022-01-01 RX ADMIN — Medication 1 DROP(S): at 11:42

## 2022-01-01 RX ADMIN — SODIUM ZIRCONIUM CYCLOSILICATE 10 GRAM(S): 10 POWDER, FOR SUSPENSION ORAL at 10:26

## 2022-01-01 RX ADMIN — SEVELAMER CARBONATE 1600 MILLIGRAM(S): 2400 POWDER, FOR SUSPENSION ORAL at 17:44

## 2022-01-01 RX ADMIN — SEVELAMER CARBONATE 1600 MILLIGRAM(S): 2400 POWDER, FOR SUSPENSION ORAL at 12:35

## 2022-01-01 RX ADMIN — SENNA PLUS 2 TABLET(S): 8.6 TABLET ORAL at 22:26

## 2022-01-01 RX ADMIN — Medication 1 GRAM(S): at 05:41

## 2022-01-01 RX ADMIN — Medication 25 MILLIGRAM(S): at 13:08

## 2022-01-01 RX ADMIN — Medication 1: at 12:24

## 2022-01-01 RX ADMIN — Medication 7.5 MILLIGRAM(S): at 05:13

## 2022-01-01 RX ADMIN — Medication 25 MILLIGRAM(S): at 21:17

## 2022-01-01 RX ADMIN — HEPARIN SODIUM 3500 UNIT(S)/HR: 5000 INJECTION INTRAVENOUS; SUBCUTANEOUS at 09:16

## 2022-01-01 RX ADMIN — Medication 100 MILLIGRAM(S): at 08:52

## 2022-01-01 RX ADMIN — Medication 81 MILLIGRAM(S): at 23:38

## 2022-01-01 RX ADMIN — CHLORHEXIDINE GLUCONATE 1 APPLICATION(S): 213 SOLUTION TOPICAL at 10:47

## 2022-01-01 RX ADMIN — Medication 1 DROP(S): at 11:11

## 2022-01-01 RX ADMIN — Medication 2: at 17:56

## 2022-01-01 RX ADMIN — Medication 25 MILLIGRAM(S): at 06:49

## 2022-01-01 RX ADMIN — LIDOCAINE 1 PATCH: 4 CREAM TOPICAL at 22:47

## 2022-01-01 RX ADMIN — HEPARIN SODIUM 42 UNIT(S)/HR: 5000 INJECTION INTRAVENOUS; SUBCUTANEOUS at 23:20

## 2022-01-01 RX ADMIN — Medication 18 UNIT(S): at 08:15

## 2022-01-01 RX ADMIN — CLOPIDOGREL BISULFATE 75 MILLIGRAM(S): 75 TABLET, FILM COATED ORAL at 13:12

## 2022-01-01 RX ADMIN — Medication 100 MILLIGRAM(S): at 22:19

## 2022-01-01 RX ADMIN — POLYETHYLENE GLYCOL 3350 17 GRAM(S): 17 POWDER, FOR SOLUTION ORAL at 05:14

## 2022-01-01 RX ADMIN — SODIUM CHLORIDE 50 MILLILITER(S): 9 INJECTION INTRAMUSCULAR; INTRAVENOUS; SUBCUTANEOUS at 17:37

## 2022-01-01 RX ADMIN — Medication 81 MILLIGRAM(S): at 13:09

## 2022-01-01 RX ADMIN — Medication 100 MILLIGRAM(S): at 16:55

## 2022-01-01 RX ADMIN — LIDOCAINE 1 PATCH: 4 CREAM TOPICAL at 17:59

## 2022-01-01 RX ADMIN — CHLORHEXIDINE GLUCONATE 1 APPLICATION(S): 213 SOLUTION TOPICAL at 05:06

## 2022-01-01 RX ADMIN — Medication 6: at 05:44

## 2022-01-01 RX ADMIN — HEPARIN SODIUM 2800 UNIT(S)/HR: 5000 INJECTION INTRAVENOUS; SUBCUTANEOUS at 02:30

## 2022-01-01 RX ADMIN — Medication 25 MILLIGRAM(S): at 17:37

## 2022-01-01 RX ADMIN — HUMAN INSULIN 12 UNIT(S): 100 INJECTION, SUSPENSION SUBCUTANEOUS at 05:43

## 2022-01-01 RX ADMIN — Medication 7.5 MILLIGRAM(S): at 17:31

## 2022-01-01 RX ADMIN — SENNA PLUS 2 TABLET(S): 8.6 TABLET ORAL at 23:37

## 2022-01-01 RX ADMIN — Medication 100 MILLIGRAM(S): at 12:28

## 2022-01-01 RX ADMIN — Medication 5 MG/HR: at 08:26

## 2022-01-01 RX ADMIN — QUETIAPINE FUMARATE 25 MILLIGRAM(S): 200 TABLET, FILM COATED ORAL at 21:18

## 2022-01-01 RX ADMIN — Medication 25 MILLIGRAM(S): at 05:02

## 2022-01-01 RX ADMIN — Medication 25 MILLIGRAM(S): at 17:31

## 2022-01-01 RX ADMIN — APIXABAN 2.5 MILLIGRAM(S): 2.5 TABLET, FILM COATED ORAL at 17:48

## 2022-01-01 RX ADMIN — Medication 100 MILLIGRAM(S): at 09:13

## 2022-01-01 RX ADMIN — Medication 2: at 08:38

## 2022-01-01 RX ADMIN — Medication 120 MILLIGRAM(S): at 06:49

## 2022-01-01 RX ADMIN — APIXABAN 5 MILLIGRAM(S): 2.5 TABLET, FILM COATED ORAL at 17:54

## 2022-01-01 RX ADMIN — ONDANSETRON 4 MILLIGRAM(S): 8 TABLET, FILM COATED ORAL at 03:15

## 2022-01-01 RX ADMIN — SEVELAMER CARBONATE 800 MILLIGRAM(S): 2400 POWDER, FOR SUSPENSION ORAL at 18:08

## 2022-01-01 RX ADMIN — Medication 25 MILLIGRAM(S): at 22:19

## 2022-01-01 RX ADMIN — SEVELAMER CARBONATE 1600 MILLIGRAM(S): 2400 POWDER, FOR SUSPENSION ORAL at 12:44

## 2022-01-01 RX ADMIN — HYDROMORPHONE HYDROCHLORIDE 1 MILLIGRAM(S): 2 INJECTION INTRAMUSCULAR; INTRAVENOUS; SUBCUTANEOUS at 23:58

## 2022-01-01 RX ADMIN — APIXABAN 5 MILLIGRAM(S): 2.5 TABLET, FILM COATED ORAL at 17:36

## 2022-01-01 RX ADMIN — Medication 25 MILLIGRAM(S): at 21:40

## 2022-01-01 RX ADMIN — CLOPIDOGREL BISULFATE 75 MILLIGRAM(S): 75 TABLET, FILM COATED ORAL at 12:26

## 2022-01-01 RX ADMIN — Medication 25 MILLIGRAM(S): at 23:37

## 2022-01-01 RX ADMIN — SENNA PLUS 2 TABLET(S): 8.6 TABLET ORAL at 21:53

## 2022-01-01 RX ADMIN — Medication 2: at 12:27

## 2022-01-01 RX ADMIN — ATORVASTATIN CALCIUM 40 MILLIGRAM(S): 80 TABLET, FILM COATED ORAL at 00:00

## 2022-01-01 RX ADMIN — ERYTHROPOIETIN 20000 UNIT(S): 10000 INJECTION, SOLUTION INTRAVENOUS; SUBCUTANEOUS at 13:30

## 2022-01-01 RX ADMIN — ATORVASTATIN CALCIUM 40 MILLIGRAM(S): 80 TABLET, FILM COATED ORAL at 21:25

## 2022-01-01 RX ADMIN — DESMOPRESSIN ACETATE 224 MICROGRAM(S): 0.1 TABLET ORAL at 21:50

## 2022-01-01 RX ADMIN — Medication 1: at 08:49

## 2022-01-01 RX ADMIN — Medication 1 APPLICATION(S): at 11:15

## 2022-01-01 RX ADMIN — APIXABAN 2.5 MILLIGRAM(S): 2.5 TABLET, FILM COATED ORAL at 18:06

## 2022-01-01 RX ADMIN — ATORVASTATIN CALCIUM 40 MILLIGRAM(S): 80 TABLET, FILM COATED ORAL at 21:34

## 2022-01-01 RX ADMIN — QUETIAPINE FUMARATE 25 MILLIGRAM(S): 200 TABLET, FILM COATED ORAL at 22:41

## 2022-01-01 RX ADMIN — ATORVASTATIN CALCIUM 40 MILLIGRAM(S): 80 TABLET, FILM COATED ORAL at 21:16

## 2022-01-01 RX ADMIN — ERYTHROPOIETIN 20000 UNIT(S): 10000 INJECTION, SOLUTION INTRAVENOUS; SUBCUTANEOUS at 13:03

## 2022-01-01 RX ADMIN — Medication 100 MILLIGRAM(S): at 05:10

## 2022-01-01 RX ADMIN — HEPARIN SODIUM 2000 UNIT(S)/HR: 5000 INJECTION INTRAVENOUS; SUBCUTANEOUS at 09:00

## 2022-01-01 RX ADMIN — Medication 1: at 18:28

## 2022-01-01 RX ADMIN — HEPARIN SODIUM 2200 UNIT(S)/HR: 5000 INJECTION INTRAVENOUS; SUBCUTANEOUS at 18:43

## 2022-01-01 RX ADMIN — Medication 100 MILLIGRAM(S): at 05:52

## 2022-01-01 RX ADMIN — SODIUM CHLORIDE 4 MILLILITER(S): 9 INJECTION INTRAMUSCULAR; INTRAVENOUS; SUBCUTANEOUS at 06:19

## 2022-01-01 RX ADMIN — Medication 2: at 12:43

## 2022-01-01 RX ADMIN — SEVELAMER CARBONATE 1600 MILLIGRAM(S): 2400 POWDER, FOR SUSPENSION ORAL at 08:07

## 2022-01-01 RX ADMIN — CHLORHEXIDINE GLUCONATE 1 APPLICATION(S): 213 SOLUTION TOPICAL at 09:06

## 2022-01-01 RX ADMIN — Medication 8: at 13:48

## 2022-01-01 RX ADMIN — HEPARIN SODIUM 1600 UNIT(S)/HR: 5000 INJECTION INTRAVENOUS; SUBCUTANEOUS at 21:26

## 2022-01-01 RX ADMIN — ATORVASTATIN CALCIUM 40 MILLIGRAM(S): 80 TABLET, FILM COATED ORAL at 22:46

## 2022-01-01 RX ADMIN — APIXABAN 5 MILLIGRAM(S): 2.5 TABLET, FILM COATED ORAL at 05:24

## 2022-01-01 RX ADMIN — POLYETHYLENE GLYCOL 3350 17 GRAM(S): 17 POWDER, FOR SOLUTION ORAL at 18:11

## 2022-01-01 RX ADMIN — Medication 2: at 18:04

## 2022-01-01 RX ADMIN — CHLORHEXIDINE GLUCONATE 1 APPLICATION(S): 213 SOLUTION TOPICAL at 08:08

## 2022-01-01 RX ADMIN — Medication 1 DROP(S): at 03:37

## 2022-01-01 RX ADMIN — Medication 25 MILLIGRAM(S): at 05:51

## 2022-01-01 RX ADMIN — HEPARIN SODIUM 0 UNIT(S)/HR: 5000 INJECTION INTRAVENOUS; SUBCUTANEOUS at 13:41

## 2022-01-01 RX ADMIN — Medication 81 MILLIGRAM(S): at 11:27

## 2022-01-01 RX ADMIN — HEPARIN SODIUM 1900 UNIT(S)/HR: 5000 INJECTION INTRAVENOUS; SUBCUTANEOUS at 09:22

## 2022-01-01 RX ADMIN — Medication 2: at 13:01

## 2022-01-01 RX ADMIN — POLYETHYLENE GLYCOL 3350 17 GRAM(S): 17 POWDER, FOR SOLUTION ORAL at 05:11

## 2022-01-01 RX ADMIN — SENNA PLUS 2 TABLET(S): 8.6 TABLET ORAL at 21:50

## 2022-01-01 RX ADMIN — PANTOPRAZOLE SODIUM 10 MG/HR: 20 TABLET, DELAYED RELEASE ORAL at 15:25

## 2022-01-01 RX ADMIN — Medication 90 MILLIGRAM(S): at 16:13

## 2022-01-01 RX ADMIN — Medication 1: at 18:13

## 2022-01-01 RX ADMIN — APIXABAN 5 MILLIGRAM(S): 2.5 TABLET, FILM COATED ORAL at 05:22

## 2022-01-01 RX ADMIN — Medication 25 MILLIGRAM(S): at 18:05

## 2022-01-01 RX ADMIN — Medication 3 MILLILITER(S): at 05:15

## 2022-01-01 RX ADMIN — HEPARIN SODIUM 1700 UNIT(S)/HR: 5000 INJECTION INTRAVENOUS; SUBCUTANEOUS at 08:42

## 2022-01-01 RX ADMIN — Medication 1 APPLICATION(S): at 11:10

## 2022-01-01 RX ADMIN — LIDOCAINE 1 PATCH: 4 CREAM TOPICAL at 11:28

## 2022-01-01 RX ADMIN — Medication 10 MILLIGRAM(S): at 18:26

## 2022-01-01 RX ADMIN — SODIUM CHLORIDE 4 MILLILITER(S): 9 INJECTION INTRAMUSCULAR; INTRAVENOUS; SUBCUTANEOUS at 05:05

## 2022-01-01 RX ADMIN — Medication 650 MILLIGRAM(S): at 18:18

## 2022-01-01 RX ADMIN — Medication 81 MILLIGRAM(S): at 11:34

## 2022-01-01 RX ADMIN — Medication 25 MILLIGRAM(S): at 03:04

## 2022-01-01 RX ADMIN — Medication 81 MILLIGRAM(S): at 12:44

## 2022-01-01 RX ADMIN — SEVELAMER CARBONATE 1600 MILLIGRAM(S): 2400 POWDER, FOR SUSPENSION ORAL at 13:37

## 2022-01-01 RX ADMIN — Medication 1 DROP(S): at 21:45

## 2022-01-01 RX ADMIN — HEPARIN SODIUM 1700 UNIT(S)/HR: 5000 INJECTION INTRAVENOUS; SUBCUTANEOUS at 15:54

## 2022-01-01 RX ADMIN — Medication 3: at 13:37

## 2022-01-01 RX ADMIN — BUMETANIDE 2 MILLIGRAM(S): 0.25 INJECTION INTRAMUSCULAR; INTRAVENOUS at 12:14

## 2022-01-01 RX ADMIN — DABIGATRAN ETEXILATE MESYLATE 150 MILLIGRAM(S): 150 CAPSULE ORAL at 16:24

## 2022-01-01 RX ADMIN — Medication 400 MILLIGRAM(S): at 16:23

## 2022-01-01 RX ADMIN — OXYCODONE AND ACETAMINOPHEN 1 TABLET(S): 5; 325 TABLET ORAL at 23:10

## 2022-01-01 RX ADMIN — Medication 100 MILLIGRAM(S): at 12:42

## 2022-01-01 RX ADMIN — Medication 81 MILLIGRAM(S): at 08:25

## 2022-01-01 RX ADMIN — APIXABAN 5 MILLIGRAM(S): 2.5 TABLET, FILM COATED ORAL at 16:55

## 2022-01-01 RX ADMIN — Medication 10 MILLIGRAM(S): at 05:16

## 2022-01-01 RX ADMIN — Medication 81 MILLIGRAM(S): at 13:05

## 2022-01-01 RX ADMIN — POLYETHYLENE GLYCOL 3350 17 GRAM(S): 17 POWDER, FOR SOLUTION ORAL at 17:24

## 2022-01-01 RX ADMIN — APIXABAN 5 MILLIGRAM(S): 2.5 TABLET, FILM COATED ORAL at 05:14

## 2022-01-01 RX ADMIN — Medication 2: at 17:22

## 2022-01-01 RX ADMIN — APIXABAN 5 MILLIGRAM(S): 2.5 TABLET, FILM COATED ORAL at 14:34

## 2022-01-01 RX ADMIN — Medication 10 UNIT(S): at 17:17

## 2022-01-01 RX ADMIN — HEPARIN SODIUM 2700 UNIT(S)/HR: 5000 INJECTION INTRAVENOUS; SUBCUTANEOUS at 23:14

## 2022-01-01 RX ADMIN — Medication 1: at 12:41

## 2022-01-01 RX ADMIN — APIXABAN 5 MILLIGRAM(S): 2.5 TABLET, FILM COATED ORAL at 17:02

## 2022-01-01 RX ADMIN — Medication 100 MILLIGRAM(S): at 05:59

## 2022-01-01 RX ADMIN — Medication 100 MILLIGRAM(S): at 06:07

## 2022-01-01 RX ADMIN — HEPARIN SODIUM 42 UNIT(S)/HR: 5000 INJECTION INTRAVENOUS; SUBCUTANEOUS at 07:08

## 2022-01-01 RX ADMIN — Medication 1: at 08:19

## 2022-01-01 RX ADMIN — HEPARIN SODIUM 2500 UNIT(S)/HR: 5000 INJECTION INTRAVENOUS; SUBCUTANEOUS at 19:39

## 2022-01-01 RX ADMIN — Medication 25 MILLIGRAM(S): at 22:27

## 2022-01-01 RX ADMIN — IRON SUCROSE 100 MILLIGRAM(S): 20 INJECTION, SOLUTION INTRAVENOUS at 18:46

## 2022-01-01 RX ADMIN — Medication 4: at 13:10

## 2022-01-01 RX ADMIN — SENNA PLUS 2 TABLET(S): 8.6 TABLET ORAL at 21:25

## 2022-01-01 RX ADMIN — CHLORHEXIDINE GLUCONATE 1 APPLICATION(S): 213 SOLUTION TOPICAL at 06:46

## 2022-01-01 RX ADMIN — Medication 1: at 11:35

## 2022-01-01 RX ADMIN — INSULIN GLARGINE 25 UNIT(S): 100 INJECTION, SOLUTION SUBCUTANEOUS at 21:54

## 2022-01-01 RX ADMIN — Medication 100 MILLIGRAM(S): at 12:58

## 2022-01-01 RX ADMIN — Medication 81 MILLIGRAM(S): at 12:50

## 2022-01-01 RX ADMIN — Medication 25 MILLIGRAM(S): at 14:08

## 2022-01-01 RX ADMIN — Medication 25 MILLIGRAM(S): at 17:01

## 2022-01-01 RX ADMIN — Medication 4: at 13:33

## 2022-01-01 RX ADMIN — SEVELAMER CARBONATE 1600 MILLIGRAM(S): 2400 POWDER, FOR SUSPENSION ORAL at 13:11

## 2022-01-01 RX ADMIN — Medication 100 MILLIGRAM(S): at 06:30

## 2022-01-01 RX ADMIN — Medication 25 MILLIGRAM(S): at 06:13

## 2022-01-01 RX ADMIN — Medication 25 MILLIGRAM(S): at 21:12

## 2022-01-01 RX ADMIN — Medication 2: at 11:44

## 2022-01-01 RX ADMIN — HEPARIN SODIUM 2100 UNIT(S)/HR: 5000 INJECTION INTRAVENOUS; SUBCUTANEOUS at 22:21

## 2022-01-01 RX ADMIN — Medication 30 MILLIGRAM(S): at 10:45

## 2022-01-01 RX ADMIN — HEPARIN SODIUM 2400 UNIT(S)/HR: 5000 INJECTION INTRAVENOUS; SUBCUTANEOUS at 19:19

## 2022-01-01 RX ADMIN — Medication 1 PACKET(S): at 05:32

## 2022-01-01 RX ADMIN — Medication 1 DROP(S): at 05:20

## 2022-01-01 RX ADMIN — POLYETHYLENE GLYCOL 3350 17 GRAM(S): 17 POWDER, FOR SOLUTION ORAL at 18:32

## 2022-01-01 RX ADMIN — Medication 1 DROP(S): at 17:20

## 2022-01-01 RX ADMIN — Medication 400 MILLIGRAM(S): at 22:30

## 2022-01-01 RX ADMIN — Medication 1 DROP(S): at 09:05

## 2022-01-01 RX ADMIN — Medication 100 MILLIGRAM(S): at 12:27

## 2022-01-01 RX ADMIN — Medication 3: at 12:17

## 2022-01-01 RX ADMIN — HEPARIN SODIUM 2700 UNIT(S)/HR: 5000 INJECTION INTRAVENOUS; SUBCUTANEOUS at 11:33

## 2022-01-01 RX ADMIN — Medication 81 MILLIGRAM(S): at 11:11

## 2022-01-01 RX ADMIN — Medication 25 MILLIGRAM(S): at 23:12

## 2022-01-01 RX ADMIN — Medication 2: at 17:41

## 2022-01-01 RX ADMIN — SENNA PLUS 2 TABLET(S): 8.6 TABLET ORAL at 21:30

## 2022-01-01 RX ADMIN — Medication 1 DROP(S): at 17:02

## 2022-01-01 RX ADMIN — Medication 2: at 18:28

## 2022-01-01 RX ADMIN — Medication 1: at 17:46

## 2022-01-01 RX ADMIN — BUMETANIDE 132 MILLIGRAM(S): 0.25 INJECTION INTRAMUSCULAR; INTRAVENOUS at 00:56

## 2022-01-01 RX ADMIN — SENNA PLUS 2 TABLET(S): 8.6 TABLET ORAL at 21:44

## 2022-01-01 RX ADMIN — APIXABAN 5 MILLIGRAM(S): 2.5 TABLET, FILM COATED ORAL at 14:02

## 2022-01-01 RX ADMIN — Medication 50 MILLIGRAM(S): at 05:24

## 2022-01-01 RX ADMIN — HEPARIN SODIUM 1600 UNIT(S)/HR: 5000 INJECTION INTRAVENOUS; SUBCUTANEOUS at 22:00

## 2022-01-01 RX ADMIN — Medication 1: at 17:30

## 2022-01-01 RX ADMIN — SODIUM CHLORIDE 40 MILLILITER(S): 9 INJECTION INTRAMUSCULAR; INTRAVENOUS; SUBCUTANEOUS at 12:59

## 2022-01-01 RX ADMIN — Medication 2: at 13:24

## 2022-01-01 RX ADMIN — ATORVASTATIN CALCIUM 40 MILLIGRAM(S): 80 TABLET, FILM COATED ORAL at 21:45

## 2022-01-01 RX ADMIN — Medication 25 MILLIGRAM(S): at 05:07

## 2022-01-01 RX ADMIN — SENNA PLUS 2 TABLET(S): 8.6 TABLET ORAL at 23:54

## 2022-01-01 RX ADMIN — Medication 25 MILLIGRAM(S): at 05:37

## 2022-01-01 RX ADMIN — CHLORHEXIDINE GLUCONATE 1 APPLICATION(S): 213 SOLUTION TOPICAL at 05:23

## 2022-01-01 RX ADMIN — Medication 25 MILLIGRAM(S): at 22:12

## 2022-01-01 RX ADMIN — Medication 25 MILLIGRAM(S): at 17:58

## 2022-01-01 RX ADMIN — PHENYLEPHRINE HYDROCHLORIDE 4.53 MICROGRAM(S)/KG/MIN: 10 INJECTION INTRAVENOUS at 22:00

## 2022-01-01 RX ADMIN — APIXABAN 5 MILLIGRAM(S): 2.5 TABLET, FILM COATED ORAL at 18:10

## 2022-01-01 RX ADMIN — QUETIAPINE FUMARATE 25 MILLIGRAM(S): 200 TABLET, FILM COATED ORAL at 21:38

## 2022-01-01 RX ADMIN — SENNA PLUS 2 TABLET(S): 8.6 TABLET ORAL at 22:12

## 2022-01-01 RX ADMIN — Medication 10 MILLIGRAM(S): at 13:06

## 2022-01-01 RX ADMIN — Medication 2: at 12:42

## 2022-01-01 RX ADMIN — Medication 25 MILLIGRAM(S): at 15:05

## 2022-01-01 RX ADMIN — APIXABAN 2.5 MILLIGRAM(S): 2.5 TABLET, FILM COATED ORAL at 17:47

## 2022-01-01 RX ADMIN — APIXABAN 5 MILLIGRAM(S): 2.5 TABLET, FILM COATED ORAL at 05:25

## 2022-01-01 RX ADMIN — Medication 400 MILLIGRAM(S): at 04:21

## 2022-01-01 RX ADMIN — APIXABAN 5 MILLIGRAM(S): 2.5 TABLET, FILM COATED ORAL at 18:24

## 2022-01-01 RX ADMIN — CHLORHEXIDINE GLUCONATE 1 APPLICATION(S): 213 SOLUTION TOPICAL at 08:41

## 2022-01-01 RX ADMIN — Medication 1: at 08:13

## 2022-01-01 RX ADMIN — Medication 1 APPLICATION(S): at 12:38

## 2022-01-01 RX ADMIN — Medication 25 MILLIGRAM(S): at 22:06

## 2022-01-01 RX ADMIN — HUMAN INSULIN 6 UNIT(S): 100 INJECTION, SUSPENSION SUBCUTANEOUS at 05:27

## 2022-01-01 RX ADMIN — Medication 5 MILLIGRAM(S): at 11:04

## 2022-01-01 RX ADMIN — OXYCODONE AND ACETAMINOPHEN 1 TABLET(S): 5; 325 TABLET ORAL at 22:35

## 2022-01-01 RX ADMIN — Medication 2: at 05:15

## 2022-01-01 RX ADMIN — Medication 81 MILLIGRAM(S): at 13:03

## 2022-01-01 RX ADMIN — Medication 1 APPLICATION(S): at 12:03

## 2022-01-01 RX ADMIN — Medication 100 MILLIGRAM(S): at 05:43

## 2022-01-01 RX ADMIN — LIDOCAINE 1 PATCH: 4 CREAM TOPICAL at 23:18

## 2022-01-01 RX ADMIN — Medication 10 MILLIGRAM(S): at 23:42

## 2022-01-01 RX ADMIN — CLOPIDOGREL BISULFATE 75 MILLIGRAM(S): 75 TABLET, FILM COATED ORAL at 13:25

## 2022-01-01 RX ADMIN — APIXABAN 5 MILLIGRAM(S): 2.5 TABLET, FILM COATED ORAL at 06:51

## 2022-01-01 RX ADMIN — CHLORHEXIDINE GLUCONATE 1 APPLICATION(S): 213 SOLUTION TOPICAL at 08:11

## 2022-01-01 RX ADMIN — Medication 180 MILLIGRAM(S): at 06:55

## 2022-01-01 RX ADMIN — Medication 25 MILLIGRAM(S): at 06:55

## 2022-01-01 RX ADMIN — SODIUM CHLORIDE 50 MILLILITER(S): 9 INJECTION INTRAMUSCULAR; INTRAVENOUS; SUBCUTANEOUS at 21:04

## 2022-01-01 RX ADMIN — HEPARIN SODIUM 2800 UNIT(S)/HR: 5000 INJECTION INTRAVENOUS; SUBCUTANEOUS at 07:04

## 2022-01-01 RX ADMIN — Medication 2: at 09:07

## 2022-01-01 RX ADMIN — Medication 1 GRAM(S): at 20:35

## 2022-01-01 RX ADMIN — Medication 2: at 17:54

## 2022-01-01 RX ADMIN — HEPARIN SODIUM 5000 UNIT(S): 5000 INJECTION INTRAVENOUS; SUBCUTANEOUS at 18:49

## 2022-01-01 RX ADMIN — Medication 1 TABLET(S): at 11:44

## 2022-01-01 RX ADMIN — Medication 1 DROP(S): at 03:36

## 2022-01-01 RX ADMIN — Medication 100 MILLIGRAM(S): at 12:07

## 2022-01-01 RX ADMIN — HEPARIN SODIUM 1600 UNIT(S)/HR: 5000 INJECTION INTRAVENOUS; SUBCUTANEOUS at 19:32

## 2022-01-01 RX ADMIN — Medication 7.5 MILLIGRAM(S): at 05:16

## 2022-01-01 RX ADMIN — Medication 2: at 12:33

## 2022-01-01 RX ADMIN — Medication 1 GRAM(S): at 17:48

## 2022-01-01 RX ADMIN — ATORVASTATIN CALCIUM 40 MILLIGRAM(S): 80 TABLET, FILM COATED ORAL at 21:53

## 2022-01-01 RX ADMIN — SEVELAMER CARBONATE 800 MILLIGRAM(S): 2400 POWDER, FOR SUSPENSION ORAL at 09:12

## 2022-01-01 RX ADMIN — OXYCODONE AND ACETAMINOPHEN 1 TABLET(S): 5; 325 TABLET ORAL at 01:35

## 2022-01-01 RX ADMIN — Medication 1 DROP(S): at 12:25

## 2022-01-01 RX ADMIN — Medication 120 MILLIGRAM(S): at 22:53

## 2022-01-01 RX ADMIN — Medication 81 MILLIGRAM(S): at 12:53

## 2022-01-01 RX ADMIN — Medication 10 MILLIGRAM(S): at 00:39

## 2022-01-01 RX ADMIN — LIDOCAINE 1 PATCH: 4 CREAM TOPICAL at 00:40

## 2022-01-01 RX ADMIN — Medication 100 MILLIGRAM(S): at 11:25

## 2022-01-01 RX ADMIN — Medication 81 MILLIGRAM(S): at 18:10

## 2022-01-01 RX ADMIN — SEVELAMER CARBONATE 1600 MILLIGRAM(S): 2400 POWDER, FOR SUSPENSION ORAL at 14:04

## 2022-01-01 RX ADMIN — Medication 1000 MILLIGRAM(S): at 23:00

## 2022-01-01 RX ADMIN — Medication 2: at 13:06

## 2022-01-01 RX ADMIN — Medication 120 MILLIGRAM(S): at 08:47

## 2022-01-01 RX ADMIN — LIDOCAINE 1 PATCH: 4 CREAM TOPICAL at 06:00

## 2022-01-01 RX ADMIN — SEVELAMER CARBONATE 800 MILLIGRAM(S): 2400 POWDER, FOR SUSPENSION ORAL at 08:28

## 2022-01-01 RX ADMIN — Medication 7.5 MILLIGRAM(S): at 16:39

## 2022-01-01 RX ADMIN — APIXABAN 5 MILLIGRAM(S): 2.5 TABLET, FILM COATED ORAL at 19:09

## 2022-01-01 RX ADMIN — Medication 100 MILLIGRAM(S): at 12:30

## 2022-01-01 RX ADMIN — CLOPIDOGREL BISULFATE 75 MILLIGRAM(S): 75 TABLET, FILM COATED ORAL at 12:06

## 2022-01-01 RX ADMIN — Medication 324 MILLIGRAM(S): at 16:18

## 2022-01-01 RX ADMIN — LIDOCAINE 1 PATCH: 4 CREAM TOPICAL at 19:22

## 2022-01-01 RX ADMIN — Medication 90 MILLIGRAM(S): at 21:43

## 2022-01-01 RX ADMIN — ATORVASTATIN CALCIUM 40 MILLIGRAM(S): 80 TABLET, FILM COATED ORAL at 21:35

## 2022-01-01 RX ADMIN — Medication 1 APPLICATION(S): at 16:00

## 2022-01-01 RX ADMIN — APIXABAN 5 MILLIGRAM(S): 2.5 TABLET, FILM COATED ORAL at 05:35

## 2022-01-01 RX ADMIN — SEVELAMER CARBONATE 800 MILLIGRAM(S): 2400 POWDER, FOR SUSPENSION ORAL at 17:22

## 2022-01-01 RX ADMIN — Medication 120 MILLIGRAM(S): at 05:20

## 2022-01-01 RX ADMIN — Medication 81 MILLIGRAM(S): at 14:26

## 2022-01-01 RX ADMIN — FENTANYL CITRATE 50 MICROGRAM(S): 50 INJECTION INTRAVENOUS at 12:55

## 2022-01-01 RX ADMIN — Medication 25 MILLIGRAM(S): at 13:10

## 2022-01-01 RX ADMIN — Medication 25 MILLIGRAM(S): at 18:09

## 2022-01-01 RX ADMIN — Medication 1 APPLICATION(S): at 05:51

## 2022-01-01 RX ADMIN — Medication 120 MILLIGRAM(S): at 05:51

## 2022-01-01 RX ADMIN — Medication 25 MILLIGRAM(S): at 14:00

## 2022-01-01 RX ADMIN — Medication 81 MILLIGRAM(S): at 12:17

## 2022-01-01 RX ADMIN — Medication 25 MILLIGRAM(S): at 22:30

## 2022-01-01 RX ADMIN — Medication 25 MILLIGRAM(S): at 18:19

## 2022-01-01 RX ADMIN — Medication 1: at 08:15

## 2022-01-01 RX ADMIN — CHLORHEXIDINE GLUCONATE 1 APPLICATION(S): 213 SOLUTION TOPICAL at 05:36

## 2022-01-01 RX ADMIN — Medication 1: at 14:04

## 2022-01-01 RX ADMIN — LACTULOSE 20 GRAM(S): 10 SOLUTION ORAL at 11:03

## 2022-01-01 RX ADMIN — Medication 1 DROP(S): at 06:46

## 2022-01-01 RX ADMIN — ATORVASTATIN CALCIUM 40 MILLIGRAM(S): 80 TABLET, FILM COATED ORAL at 21:12

## 2022-01-01 RX ADMIN — Medication 120 MILLIGRAM(S): at 21:56

## 2022-01-01 RX ADMIN — APIXABAN 5 MILLIGRAM(S): 2.5 TABLET, FILM COATED ORAL at 06:54

## 2022-01-01 RX ADMIN — Medication 1 APPLICATION(S): at 12:47

## 2022-01-01 RX ADMIN — Medication 10 MG/HR: at 16:00

## 2022-01-01 RX ADMIN — Medication 3: at 12:42

## 2022-01-01 RX ADMIN — ATORVASTATIN CALCIUM 40 MILLIGRAM(S): 80 TABLET, FILM COATED ORAL at 21:15

## 2022-01-01 RX ADMIN — Medication 25 MILLIGRAM(S): at 22:58

## 2022-01-01 RX ADMIN — Medication 100 MILLIGRAM(S): at 10:30

## 2022-01-01 RX ADMIN — HEPARIN SODIUM 5000 UNIT(S): 5000 INJECTION INTRAVENOUS; SUBCUTANEOUS at 08:43

## 2022-01-01 RX ADMIN — SEVELAMER CARBONATE 1600 MILLIGRAM(S): 2400 POWDER, FOR SUSPENSION ORAL at 12:41

## 2022-01-01 RX ADMIN — Medication 1 APPLICATION(S): at 21:11

## 2022-01-01 RX ADMIN — Medication 25 MILLIGRAM(S): at 01:52

## 2022-01-01 RX ADMIN — CHLORHEXIDINE GLUCONATE 1 APPLICATION(S): 213 SOLUTION TOPICAL at 14:42

## 2022-01-01 RX ADMIN — PANTOPRAZOLE SODIUM 40 MILLIGRAM(S): 20 TABLET, DELAYED RELEASE ORAL at 17:19

## 2022-01-01 RX ADMIN — OXYCODONE AND ACETAMINOPHEN 1 TABLET(S): 5; 325 TABLET ORAL at 22:36

## 2022-01-01 RX ADMIN — Medication 1000 MILLIGRAM(S): at 13:11

## 2022-01-01 RX ADMIN — Medication 81 MILLIGRAM(S): at 13:01

## 2022-01-01 RX ADMIN — Medication 2: at 18:15

## 2022-01-01 RX ADMIN — Medication 1 APPLICATION(S): at 13:26

## 2022-01-01 RX ADMIN — POLYETHYLENE GLYCOL 3350 17 GRAM(S): 17 POWDER, FOR SOLUTION ORAL at 05:37

## 2022-01-01 RX ADMIN — QUETIAPINE FUMARATE 25 MILLIGRAM(S): 200 TABLET, FILM COATED ORAL at 22:03

## 2022-01-01 RX ADMIN — Medication 100 MILLIGRAM(S): at 12:44

## 2022-01-01 RX ADMIN — APIXABAN 5 MILLIGRAM(S): 2.5 TABLET, FILM COATED ORAL at 05:52

## 2022-01-01 RX ADMIN — ATORVASTATIN CALCIUM 40 MILLIGRAM(S): 80 TABLET, FILM COATED ORAL at 21:56

## 2022-01-01 RX ADMIN — SEVELAMER CARBONATE 1600 MILLIGRAM(S): 2400 POWDER, FOR SUSPENSION ORAL at 09:04

## 2022-01-01 RX ADMIN — QUETIAPINE FUMARATE 25 MILLIGRAM(S): 200 TABLET, FILM COATED ORAL at 21:45

## 2022-01-01 RX ADMIN — POLYETHYLENE GLYCOL 3350 17 GRAM(S): 17 POWDER, FOR SOLUTION ORAL at 06:46

## 2022-01-01 RX ADMIN — Medication 25 MILLIGRAM(S): at 05:40

## 2022-01-01 RX ADMIN — Medication 1: at 09:07

## 2022-01-01 RX ADMIN — APIXABAN 5 MILLIGRAM(S): 2.5 TABLET, FILM COATED ORAL at 06:52

## 2022-01-01 RX ADMIN — SODIUM CHLORIDE 40 MILLILITER(S): 9 INJECTION INTRAMUSCULAR; INTRAVENOUS; SUBCUTANEOUS at 16:39

## 2022-01-01 RX ADMIN — HEPARIN SODIUM 5000 UNIT(S): 5000 INJECTION INTRAVENOUS; SUBCUTANEOUS at 09:02

## 2022-01-01 RX ADMIN — APIXABAN 5 MILLIGRAM(S): 2.5 TABLET, FILM COATED ORAL at 01:54

## 2022-01-01 RX ADMIN — Medication 10 MILLIGRAM(S): at 06:48

## 2022-01-01 RX ADMIN — Medication 1 PACKET(S): at 17:35

## 2022-01-01 RX ADMIN — HEPARIN SODIUM 2700 UNIT(S)/HR: 5000 INJECTION INTRAVENOUS; SUBCUTANEOUS at 14:22

## 2022-01-01 RX ADMIN — SODIUM CHLORIDE 50 MILLILITER(S): 9 INJECTION INTRAMUSCULAR; INTRAVENOUS; SUBCUTANEOUS at 06:34

## 2022-01-01 RX ADMIN — CEFTRIAXONE 100 MILLIGRAM(S): 500 INJECTION, POWDER, FOR SOLUTION INTRAMUSCULAR; INTRAVENOUS at 06:47

## 2022-01-01 RX ADMIN — Medication 100 MILLIGRAM(S): at 09:04

## 2022-01-01 RX ADMIN — MIDAZOLAM HYDROCHLORIDE 2 MILLIGRAM(S): 1 INJECTION, SOLUTION INTRAMUSCULAR; INTRAVENOUS at 13:00

## 2022-01-01 RX ADMIN — Medication 1: at 13:02

## 2022-01-01 RX ADMIN — Medication 25 MILLIGRAM(S): at 05:25

## 2022-01-01 RX ADMIN — Medication 100 MILLIGRAM(S): at 22:06

## 2022-01-01 RX ADMIN — Medication 3 MILLILITER(S): at 01:06

## 2022-01-01 RX ADMIN — SEVELAMER CARBONATE 1600 MILLIGRAM(S): 2400 POWDER, FOR SUSPENSION ORAL at 09:58

## 2022-01-01 RX ADMIN — Medication 1 DROP(S): at 17:50

## 2022-01-01 RX ADMIN — LIDOCAINE 1 PATCH: 4 CREAM TOPICAL at 12:13

## 2022-01-01 RX ADMIN — HEPARIN SODIUM 0 UNIT(S)/HR: 5000 INJECTION INTRAVENOUS; SUBCUTANEOUS at 01:26

## 2022-01-01 RX ADMIN — ATORVASTATIN CALCIUM 40 MILLIGRAM(S): 80 TABLET, FILM COATED ORAL at 22:53

## 2022-01-01 RX ADMIN — Medication 81 MILLIGRAM(S): at 12:20

## 2022-01-01 RX ADMIN — Medication 25 MILLIGRAM(S): at 05:21

## 2022-01-01 RX ADMIN — Medication 10 MG/HR: at 22:40

## 2022-01-01 RX ADMIN — OXYCODONE AND ACETAMINOPHEN 1 TABLET(S): 5; 325 TABLET ORAL at 13:17

## 2022-01-01 RX ADMIN — Medication 7.5 MILLIGRAM(S): at 00:16

## 2022-01-01 RX ADMIN — Medication 1 APPLICATION(S): at 11:53

## 2022-01-01 RX ADMIN — SODIUM CHLORIDE 6000 MILLILITER(S): 9 INJECTION, SOLUTION INTRAVENOUS at 03:21

## 2022-01-01 RX ADMIN — HEPARIN SODIUM 3500 UNIT(S)/HR: 5000 INJECTION INTRAVENOUS; SUBCUTANEOUS at 17:55

## 2022-01-01 RX ADMIN — SEVELAMER CARBONATE 1600 MILLIGRAM(S): 2400 POWDER, FOR SUSPENSION ORAL at 09:00

## 2022-01-01 RX ADMIN — OXYCODONE AND ACETAMINOPHEN 1 TABLET(S): 5; 325 TABLET ORAL at 12:47

## 2022-01-01 RX ADMIN — POLYETHYLENE GLYCOL 3350 17 GRAM(S): 17 POWDER, FOR SOLUTION ORAL at 17:20

## 2022-01-01 RX ADMIN — Medication 2: at 08:14

## 2022-01-01 RX ADMIN — Medication 4: at 17:44

## 2022-01-01 RX ADMIN — Medication 3: at 12:36

## 2022-01-01 RX ADMIN — Medication 100 MILLIGRAM(S): at 12:05

## 2022-01-01 RX ADMIN — Medication 1 PACKET(S): at 22:40

## 2022-01-01 RX ADMIN — ATORVASTATIN CALCIUM 40 MILLIGRAM(S): 80 TABLET, FILM COATED ORAL at 22:05

## 2022-01-01 RX ADMIN — HEPARIN SODIUM 2200 UNIT(S)/HR: 5000 INJECTION INTRAVENOUS; SUBCUTANEOUS at 16:10

## 2022-01-01 RX ADMIN — CHLORHEXIDINE GLUCONATE 1 APPLICATION(S): 213 SOLUTION TOPICAL at 05:43

## 2022-01-01 RX ADMIN — Medication 1 APPLICATION(S): at 13:08

## 2022-01-01 RX ADMIN — Medication 12 UNIT(S): at 12:07

## 2022-01-01 RX ADMIN — Medication 100 MILLIGRAM(S): at 17:59

## 2022-01-01 RX ADMIN — Medication 1: at 09:14

## 2022-01-01 RX ADMIN — SEVELAMER CARBONATE 1600 MILLIGRAM(S): 2400 POWDER, FOR SUSPENSION ORAL at 17:27

## 2022-01-01 RX ADMIN — Medication 1 GRAM(S): at 17:46

## 2022-01-01 RX ADMIN — Medication 100 MILLIGRAM(S): at 06:55

## 2022-01-01 RX ADMIN — Medication 1 DROP(S): at 15:30

## 2022-01-01 RX ADMIN — APIXABAN 5 MILLIGRAM(S): 2.5 TABLET, FILM COATED ORAL at 05:51

## 2022-01-01 RX ADMIN — Medication 1 DROP(S): at 12:46

## 2022-01-01 RX ADMIN — PANTOPRAZOLE SODIUM 10 MG/HR: 20 TABLET, DELAYED RELEASE ORAL at 07:33

## 2022-01-01 RX ADMIN — Medication 7.5 MILLIGRAM(S): at 00:54

## 2022-01-01 RX ADMIN — OXYCODONE AND ACETAMINOPHEN 1 TABLET(S): 5; 325 TABLET ORAL at 00:42

## 2022-01-01 RX ADMIN — Medication 81 MILLIGRAM(S): at 12:47

## 2022-01-01 RX ADMIN — DEXMEDETOMIDINE HYDROCHLORIDE IN 0.9% SODIUM CHLORIDE 6.05 MICROGRAM(S)/KG/HR: 4 INJECTION INTRAVENOUS at 21:45

## 2022-01-01 RX ADMIN — POLYETHYLENE GLYCOL 3350 17 GRAM(S): 17 POWDER, FOR SOLUTION ORAL at 05:18

## 2022-01-01 RX ADMIN — Medication 81 MILLIGRAM(S): at 11:56

## 2022-01-01 RX ADMIN — Medication 81 MILLIGRAM(S): at 10:34

## 2022-01-01 RX ADMIN — Medication 100 MILLIGRAM(S): at 05:26

## 2022-01-01 RX ADMIN — Medication 100 MILLIGRAM(S): at 21:19

## 2022-01-01 RX ADMIN — Medication 2: at 17:19

## 2022-01-01 RX ADMIN — LIDOCAINE 1 PATCH: 4 CREAM TOPICAL at 11:11

## 2022-01-01 RX ADMIN — Medication 4: at 07:54

## 2022-01-01 RX ADMIN — Medication 1: at 08:40

## 2022-01-01 RX ADMIN — Medication 2: at 12:50

## 2022-01-01 RX ADMIN — APIXABAN 5 MILLIGRAM(S): 2.5 TABLET, FILM COATED ORAL at 17:05

## 2022-01-01 RX ADMIN — Medication 10 MILLIGRAM(S): at 16:01

## 2022-01-01 RX ADMIN — ATORVASTATIN CALCIUM 40 MILLIGRAM(S): 80 TABLET, FILM COATED ORAL at 21:26

## 2022-01-01 RX ADMIN — Medication 1 DROP(S): at 05:41

## 2022-01-01 RX ADMIN — MUPIROCIN 1 APPLICATION(S): 20 OINTMENT TOPICAL at 18:36

## 2022-01-01 RX ADMIN — HEPARIN SODIUM 1600 UNIT(S)/HR: 5000 INJECTION INTRAVENOUS; SUBCUTANEOUS at 07:27

## 2022-01-01 RX ADMIN — APIXABAN 5 MILLIGRAM(S): 2.5 TABLET, FILM COATED ORAL at 05:32

## 2022-01-01 RX ADMIN — Medication 100 MILLIGRAM(S): at 08:14

## 2022-01-01 RX ADMIN — Medication 120 MILLIGRAM(S): at 06:00

## 2022-01-01 RX ADMIN — Medication 100 MILLIGRAM(S): at 05:38

## 2022-01-01 RX ADMIN — SENNA PLUS 2 TABLET(S): 8.6 TABLET ORAL at 21:20

## 2022-01-01 RX ADMIN — LIDOCAINE 1 PATCH: 4 CREAM TOPICAL at 19:40

## 2022-01-01 RX ADMIN — Medication 10 MG/HR: at 09:26

## 2022-01-01 RX ADMIN — Medication 7.5 MILLIGRAM(S): at 23:42

## 2022-01-01 RX ADMIN — Medication 81 MILLIGRAM(S): at 13:11

## 2022-01-01 RX ADMIN — BUMETANIDE 2 MILLIGRAM(S): 0.25 INJECTION INTRAMUSCULAR; INTRAVENOUS at 12:09

## 2022-01-01 RX ADMIN — Medication 100 MILLIGRAM(S): at 05:34

## 2022-01-01 RX ADMIN — Medication 1 DROP(S): at 00:11

## 2022-01-01 RX ADMIN — SEVELAMER CARBONATE 1600 MILLIGRAM(S): 2400 POWDER, FOR SUSPENSION ORAL at 08:56

## 2022-01-01 RX ADMIN — SEVELAMER CARBONATE 1600 MILLIGRAM(S): 2400 POWDER, FOR SUSPENSION ORAL at 17:16

## 2022-01-01 RX ADMIN — CHLORHEXIDINE GLUCONATE 1 APPLICATION(S): 213 SOLUTION TOPICAL at 05:41

## 2022-01-01 RX ADMIN — Medication 120 MILLIGRAM(S): at 23:54

## 2022-01-01 RX ADMIN — HYDROMORPHONE HYDROCHLORIDE 1 MILLIGRAM(S): 2 INJECTION INTRAMUSCULAR; INTRAVENOUS; SUBCUTANEOUS at 00:20

## 2022-01-01 RX ADMIN — Medication 25 MILLIGRAM(S): at 21:45

## 2022-01-01 RX ADMIN — Medication 25 MILLIGRAM(S): at 05:53

## 2022-01-01 RX ADMIN — APIXABAN 5 MILLIGRAM(S): 2.5 TABLET, FILM COATED ORAL at 05:59

## 2022-01-01 RX ADMIN — Medication 3 MILLILITER(S): at 23:20

## 2022-01-01 RX ADMIN — Medication 81 MILLIGRAM(S): at 13:24

## 2022-01-01 RX ADMIN — SEVELAMER CARBONATE 1600 MILLIGRAM(S): 2400 POWDER, FOR SUSPENSION ORAL at 17:09

## 2022-01-01 RX ADMIN — Medication 10 MILLIGRAM(S): at 17:57

## 2022-01-01 RX ADMIN — LIDOCAINE 1 PATCH: 4 CREAM TOPICAL at 20:27

## 2022-01-01 RX ADMIN — Medication 100 MILLIGRAM(S): at 06:50

## 2022-01-01 RX ADMIN — HEPARIN SODIUM 3400 UNIT(S)/HR: 5000 INJECTION INTRAVENOUS; SUBCUTANEOUS at 02:24

## 2022-01-01 RX ADMIN — Medication 25 MILLIGRAM(S): at 21:31

## 2022-01-01 RX ADMIN — HEPARIN SODIUM 3000 UNIT(S)/HR: 5000 INJECTION INTRAVENOUS; SUBCUTANEOUS at 01:34

## 2022-01-01 RX ADMIN — OXYCODONE AND ACETAMINOPHEN 1 TABLET(S): 5; 325 TABLET ORAL at 06:14

## 2022-01-01 RX ADMIN — Medication 120 MILLIGRAM(S): at 05:15

## 2022-01-01 RX ADMIN — POLYETHYLENE GLYCOL 3350 17 GRAM(S): 17 POWDER, FOR SOLUTION ORAL at 17:59

## 2022-01-01 RX ADMIN — Medication 400 MILLIGRAM(S): at 22:36

## 2022-01-01 RX ADMIN — SENNA PLUS 2 TABLET(S): 8.6 TABLET ORAL at 21:58

## 2022-01-01 RX ADMIN — SEVELAMER CARBONATE 1600 MILLIGRAM(S): 2400 POWDER, FOR SUSPENSION ORAL at 18:19

## 2022-01-01 RX ADMIN — SEVELAMER CARBONATE 1600 MILLIGRAM(S): 2400 POWDER, FOR SUSPENSION ORAL at 17:38

## 2022-01-01 RX ADMIN — HUMAN INSULIN 2 UNIT(S): 100 INJECTION, SUSPENSION SUBCUTANEOUS at 05:52

## 2022-01-01 RX ADMIN — POLYETHYLENE GLYCOL 3350 17 GRAM(S): 17 POWDER, FOR SOLUTION ORAL at 05:05

## 2022-01-01 RX ADMIN — HEPARIN SODIUM 2500 UNIT(S)/HR: 5000 INJECTION INTRAVENOUS; SUBCUTANEOUS at 19:22

## 2022-01-01 RX ADMIN — HEPARIN SODIUM 0 UNIT(S)/HR: 5000 INJECTION INTRAVENOUS; SUBCUTANEOUS at 01:01

## 2022-01-01 RX ADMIN — Medication 1 APPLICATION(S): at 12:06

## 2022-01-01 RX ADMIN — HYDROMORPHONE HYDROCHLORIDE 1 MILLIGRAM(S): 2 INJECTION INTRAMUSCULAR; INTRAVENOUS; SUBCUTANEOUS at 14:40

## 2022-01-01 RX ADMIN — Medication 120 MILLIGRAM(S): at 05:41

## 2022-01-01 RX ADMIN — Medication 25 MILLIGRAM(S): at 05:41

## 2022-01-01 RX ADMIN — POLYETHYLENE GLYCOL 3350 17 GRAM(S): 17 POWDER, FOR SOLUTION ORAL at 18:35

## 2022-01-01 RX ADMIN — Medication 1 APPLICATION(S): at 00:12

## 2022-01-01 RX ADMIN — Medication 100 MILLIGRAM(S): at 11:44

## 2022-01-01 RX ADMIN — CHLORHEXIDINE GLUCONATE 1 APPLICATION(S): 213 SOLUTION TOPICAL at 09:07

## 2022-01-01 RX ADMIN — Medication 1: at 17:32

## 2022-01-01 RX ADMIN — HUMAN INSULIN 6 UNIT(S): 100 INJECTION, SUSPENSION SUBCUTANEOUS at 17:50

## 2022-01-01 RX ADMIN — OXYCODONE AND ACETAMINOPHEN 1 TABLET(S): 5; 325 TABLET ORAL at 22:41

## 2022-01-01 RX ADMIN — CHLORHEXIDINE GLUCONATE 1 APPLICATION(S): 213 SOLUTION TOPICAL at 05:33

## 2022-01-01 RX ADMIN — LIDOCAINE 1 PATCH: 4 CREAM TOPICAL at 19:27

## 2022-01-01 RX ADMIN — BUMETANIDE 2 MILLIGRAM(S): 0.25 INJECTION INTRAMUSCULAR; INTRAVENOUS at 23:38

## 2022-01-01 RX ADMIN — ERYTHROPOIETIN 10000 UNIT(S): 10000 INJECTION, SOLUTION INTRAVENOUS; SUBCUTANEOUS at 15:14

## 2022-01-01 RX ADMIN — Medication 25 MILLIGRAM(S): at 06:01

## 2022-01-01 RX ADMIN — CHLORHEXIDINE GLUCONATE 1 APPLICATION(S): 213 SOLUTION TOPICAL at 05:20

## 2022-01-01 RX ADMIN — Medication 1 APPLICATION(S): at 23:12

## 2022-01-01 RX ADMIN — HEPARIN SODIUM 2000 UNIT(S)/HR: 5000 INJECTION INTRAVENOUS; SUBCUTANEOUS at 02:05

## 2022-01-01 RX ADMIN — CHLORHEXIDINE GLUCONATE 1 APPLICATION(S): 213 SOLUTION TOPICAL at 05:11

## 2022-01-01 RX ADMIN — SEVELAMER CARBONATE 1600 MILLIGRAM(S): 2400 POWDER, FOR SUSPENSION ORAL at 17:52

## 2022-01-01 RX ADMIN — Medication 1 DROP(S): at 15:00

## 2022-01-01 RX ADMIN — Medication 10 MG/HR: at 16:09

## 2022-01-01 RX ADMIN — Medication 81 MILLIGRAM(S): at 12:29

## 2022-01-01 RX ADMIN — Medication 1: at 08:06

## 2022-01-01 RX ADMIN — APIXABAN 5 MILLIGRAM(S): 2.5 TABLET, FILM COATED ORAL at 17:52

## 2022-01-01 RX ADMIN — HEPARIN SODIUM 5000 UNIT(S): 5000 INJECTION INTRAVENOUS; SUBCUTANEOUS at 16:37

## 2022-01-01 RX ADMIN — Medication 81 MILLIGRAM(S): at 17:15

## 2022-01-01 RX ADMIN — POLYETHYLENE GLYCOL 3350 17 GRAM(S): 17 POWDER, FOR SOLUTION ORAL at 17:31

## 2022-01-01 RX ADMIN — Medication 40 MILLIEQUIVALENT(S): at 01:13

## 2022-01-01 RX ADMIN — Medication 120 MILLIGRAM(S): at 05:59

## 2022-01-01 RX ADMIN — Medication 1: at 13:26

## 2022-01-01 RX ADMIN — HEPARIN SODIUM 3900 UNIT(S)/HR: 5000 INJECTION INTRAVENOUS; SUBCUTANEOUS at 11:25

## 2022-01-01 RX ADMIN — POLYETHYLENE GLYCOL 3350 17 GRAM(S): 17 POWDER, FOR SOLUTION ORAL at 05:39

## 2022-01-01 RX ADMIN — Medication 100 MILLIGRAM(S): at 13:03

## 2022-01-01 RX ADMIN — Medication 120 MILLIGRAM(S): at 22:21

## 2022-01-01 RX ADMIN — Medication 1 PACKET(S): at 09:05

## 2022-01-01 RX ADMIN — HEPARIN SODIUM 3500 UNIT(S)/HR: 5000 INJECTION INTRAVENOUS; SUBCUTANEOUS at 10:52

## 2022-01-01 RX ADMIN — Medication 100 MILLIGRAM(S): at 13:42

## 2022-01-01 RX ADMIN — SEVELAMER CARBONATE 800 MILLIGRAM(S): 2400 POWDER, FOR SUSPENSION ORAL at 13:03

## 2022-01-01 RX ADMIN — Medication 6: at 01:26

## 2022-01-01 RX ADMIN — CHLORHEXIDINE GLUCONATE 1 APPLICATION(S): 213 SOLUTION TOPICAL at 06:01

## 2022-01-01 RX ADMIN — APIXABAN 5 MILLIGRAM(S): 2.5 TABLET, FILM COATED ORAL at 03:03

## 2022-01-01 RX ADMIN — HEPARIN SODIUM 2700 UNIT(S)/HR: 5000 INJECTION INTRAVENOUS; SUBCUTANEOUS at 06:14

## 2022-01-01 RX ADMIN — SEVELAMER CARBONATE 1600 MILLIGRAM(S): 2400 POWDER, FOR SUSPENSION ORAL at 09:35

## 2022-01-01 RX ADMIN — Medication 650 MILLIGRAM(S): at 22:28

## 2022-01-01 RX ADMIN — Medication 1: at 17:20

## 2022-01-01 RX ADMIN — Medication 5 MILLIGRAM(S): at 13:54

## 2022-01-01 RX ADMIN — Medication 81 MILLIGRAM(S): at 11:43

## 2022-01-01 RX ADMIN — APIXABAN 5 MILLIGRAM(S): 2.5 TABLET, FILM COATED ORAL at 18:17

## 2022-01-01 RX ADMIN — HEPARIN SODIUM 1600 UNIT(S)/HR: 5000 INJECTION INTRAVENOUS; SUBCUTANEOUS at 04:11

## 2022-01-01 RX ADMIN — HEPARIN SODIUM 1600 UNIT(S)/HR: 5000 INJECTION INTRAVENOUS; SUBCUTANEOUS at 14:46

## 2022-01-01 RX ADMIN — OXYCODONE AND ACETAMINOPHEN 1 TABLET(S): 5; 325 TABLET ORAL at 11:00

## 2022-01-01 RX ADMIN — Medication 1 APPLICATION(S): at 18:52

## 2022-01-01 RX ADMIN — Medication 25 MILLIGRAM(S): at 00:00

## 2022-01-01 RX ADMIN — POLYETHYLENE GLYCOL 3350 17 GRAM(S): 17 POWDER, FOR SOLUTION ORAL at 05:36

## 2022-01-01 RX ADMIN — Medication 1 DROP(S): at 12:45

## 2022-01-01 RX ADMIN — Medication 3 MILLILITER(S): at 11:23

## 2022-01-01 RX ADMIN — LIDOCAINE 1 PATCH: 4 CREAM TOPICAL at 11:13

## 2022-01-01 RX ADMIN — Medication 2: at 07:56

## 2022-01-01 RX ADMIN — Medication 2: at 18:13

## 2022-01-01 RX ADMIN — Medication 120 MILLIGRAM(S): at 05:11

## 2022-01-01 RX ADMIN — Medication 100 MILLIGRAM(S): at 22:53

## 2022-01-01 RX ADMIN — Medication 1: at 08:53

## 2022-01-01 RX ADMIN — CHLORHEXIDINE GLUCONATE 1 APPLICATION(S): 213 SOLUTION TOPICAL at 12:18

## 2022-01-01 RX ADMIN — POLYETHYLENE GLYCOL 3350 17 GRAM(S): 17 POWDER, FOR SOLUTION ORAL at 05:30

## 2022-01-01 RX ADMIN — HEPARIN SODIUM 4200 UNIT(S)/HR: 5000 INJECTION INTRAVENOUS; SUBCUTANEOUS at 00:40

## 2022-01-01 RX ADMIN — Medication 25 MILLIGRAM(S): at 21:51

## 2022-01-01 RX ADMIN — Medication 25 MILLIGRAM(S): at 15:09

## 2022-01-01 RX ADMIN — SEVELAMER CARBONATE 1600 MILLIGRAM(S): 2400 POWDER, FOR SUSPENSION ORAL at 12:59

## 2022-01-01 RX ADMIN — Medication 3 MILLILITER(S): at 17:03

## 2022-01-01 RX ADMIN — Medication 10 MG/HR: at 07:25

## 2022-01-01 RX ADMIN — Medication 81 MILLIGRAM(S): at 11:58

## 2022-01-01 RX ADMIN — Medication 1 DROP(S): at 09:19

## 2022-01-01 RX ADMIN — Medication 100 MILLIGRAM(S): at 12:29

## 2022-01-01 RX ADMIN — LIDOCAINE 1 PATCH: 4 CREAM TOPICAL at 12:36

## 2022-01-01 RX ADMIN — OXYCODONE AND ACETAMINOPHEN 1 TABLET(S): 5; 325 TABLET ORAL at 23:23

## 2022-01-01 RX ADMIN — Medication 100 MILLIGRAM(S): at 06:52

## 2022-01-01 RX ADMIN — Medication 25 MILLIGRAM(S): at 14:34

## 2022-01-01 RX ADMIN — HEPARIN SODIUM 2000 UNIT(S)/HR: 5000 INJECTION INTRAVENOUS; SUBCUTANEOUS at 06:40

## 2022-01-01 RX ADMIN — Medication 100 MILLIGRAM(S): at 18:33

## 2022-01-01 RX ADMIN — APIXABAN 5 MILLIGRAM(S): 2.5 TABLET, FILM COATED ORAL at 05:15

## 2022-01-01 RX ADMIN — Medication 81 MILLIGRAM(S): at 18:04

## 2022-01-01 RX ADMIN — Medication 120 MILLIGRAM(S): at 05:14

## 2022-01-01 RX ADMIN — Medication 5 MILLIGRAM(S): at 17:58

## 2022-01-01 RX ADMIN — SEVELAMER CARBONATE 1600 MILLIGRAM(S): 2400 POWDER, FOR SUSPENSION ORAL at 18:46

## 2022-01-01 RX ADMIN — HEPARIN SODIUM 2200 UNIT(S)/HR: 5000 INJECTION INTRAVENOUS; SUBCUTANEOUS at 19:33

## 2022-01-01 RX ADMIN — SODIUM CHLORIDE 1000 MILLILITER(S): 9 INJECTION INTRAMUSCULAR; INTRAVENOUS; SUBCUTANEOUS at 18:08

## 2022-01-01 RX ADMIN — HEPARIN SODIUM 2200 UNIT(S)/HR: 5000 INJECTION INTRAVENOUS; SUBCUTANEOUS at 07:24

## 2022-01-01 RX ADMIN — Medication 1 DROP(S): at 17:37

## 2022-01-01 RX ADMIN — CEFTRIAXONE 100 MILLIGRAM(S): 500 INJECTION, POWDER, FOR SOLUTION INTRAMUSCULAR; INTRAVENOUS at 09:21

## 2022-01-01 RX ADMIN — Medication 12.5 MILLIGRAM(S): at 18:06

## 2022-01-01 RX ADMIN — LIDOCAINE 1 PATCH: 4 CREAM TOPICAL at 19:30

## 2022-01-01 RX ADMIN — CHLORHEXIDINE GLUCONATE 1 APPLICATION(S): 213 SOLUTION TOPICAL at 06:39

## 2022-01-01 RX ADMIN — Medication 1 DROP(S): at 22:06

## 2022-01-01 RX ADMIN — Medication 100 MILLIGRAM(S): at 13:49

## 2022-01-01 RX ADMIN — Medication 1 DROP(S): at 17:57

## 2022-01-01 RX ADMIN — CEFTRIAXONE 1000 MILLIGRAM(S): 500 INJECTION, POWDER, FOR SOLUTION INTRAMUSCULAR; INTRAVENOUS at 08:38

## 2022-01-01 RX ADMIN — APIXABAN 5 MILLIGRAM(S): 2.5 TABLET, FILM COATED ORAL at 01:17

## 2022-01-01 RX ADMIN — Medication 25 MILLIGRAM(S): at 11:43

## 2022-01-01 RX ADMIN — Medication 1 DROP(S): at 13:26

## 2022-01-01 RX ADMIN — Medication 100 MILLIGRAM(S): at 09:17

## 2022-01-01 RX ADMIN — ATORVASTATIN CALCIUM 40 MILLIGRAM(S): 80 TABLET, FILM COATED ORAL at 22:43

## 2022-01-01 RX ADMIN — HEPARIN SODIUM 1900 UNIT(S)/HR: 5000 INJECTION INTRAVENOUS; SUBCUTANEOUS at 19:25

## 2022-01-01 RX ADMIN — LIDOCAINE 1 PATCH: 4 CREAM TOPICAL at 14:31

## 2022-01-01 RX ADMIN — SEVELAMER CARBONATE 800 MILLIGRAM(S): 2400 POWDER, FOR SUSPENSION ORAL at 12:51

## 2022-01-01 RX ADMIN — Medication 1 DROP(S): at 23:19

## 2022-01-01 RX ADMIN — LIDOCAINE 1 PATCH: 4 CREAM TOPICAL at 00:03

## 2022-01-01 RX ADMIN — Medication 81 MILLIGRAM(S): at 12:21

## 2022-01-01 RX ADMIN — CHLORHEXIDINE GLUCONATE 1 APPLICATION(S): 213 SOLUTION TOPICAL at 05:34

## 2022-01-01 RX ADMIN — Medication 25 MILLIGRAM(S): at 18:45

## 2022-01-01 RX ADMIN — Medication 100 MILLIGRAM(S): at 18:10

## 2022-01-01 RX ADMIN — CHLORHEXIDINE GLUCONATE 1 APPLICATION(S): 213 SOLUTION TOPICAL at 06:02

## 2022-01-01 RX ADMIN — Medication 2: at 17:27

## 2022-01-01 RX ADMIN — ATORVASTATIN CALCIUM 40 MILLIGRAM(S): 80 TABLET, FILM COATED ORAL at 21:48

## 2022-01-01 RX ADMIN — HEPARIN SODIUM 3000 UNIT(S)/HR: 5000 INJECTION INTRAVENOUS; SUBCUTANEOUS at 00:46

## 2022-01-01 RX ADMIN — Medication 1 APPLICATION(S): at 00:01

## 2022-01-01 RX ADMIN — Medication 1 APPLICATION(S): at 22:25

## 2022-01-01 RX ADMIN — Medication 81 MILLIGRAM(S): at 18:34

## 2022-01-01 RX ADMIN — CHLORHEXIDINE GLUCONATE 1 APPLICATION(S): 213 SOLUTION TOPICAL at 09:20

## 2022-01-01 RX ADMIN — SENNA PLUS 2 TABLET(S): 8.6 TABLET ORAL at 22:27

## 2022-01-01 RX ADMIN — Medication 100 MILLIGRAM(S): at 05:55

## 2022-01-01 RX ADMIN — SODIUM CHLORIDE 50 MILLILITER(S): 9 INJECTION INTRAMUSCULAR; INTRAVENOUS; SUBCUTANEOUS at 13:39

## 2022-01-01 RX ADMIN — HEPARIN SODIUM 2200 UNIT(S)/HR: 5000 INJECTION INTRAVENOUS; SUBCUTANEOUS at 19:07

## 2022-01-01 RX ADMIN — CEFTRIAXONE 100 MILLIGRAM(S): 500 INJECTION, POWDER, FOR SOLUTION INTRAMUSCULAR; INTRAVENOUS at 08:02

## 2022-01-01 RX ADMIN — APIXABAN 5 MILLIGRAM(S): 2.5 TABLET, FILM COATED ORAL at 05:40

## 2022-01-01 RX ADMIN — Medication 1: at 12:56

## 2022-01-01 RX ADMIN — Medication 1 GRAM(S): at 17:01

## 2022-01-01 RX ADMIN — HEPARIN SODIUM 2700 UNIT(S)/HR: 5000 INJECTION INTRAVENOUS; SUBCUTANEOUS at 07:59

## 2022-01-01 RX ADMIN — Medication 120 MILLIGRAM(S): at 05:47

## 2022-01-01 RX ADMIN — CHLORHEXIDINE GLUCONATE 1 APPLICATION(S): 213 SOLUTION TOPICAL at 05:48

## 2022-01-01 RX ADMIN — PANTOPRAZOLE SODIUM 10 MG/HR: 20 TABLET, DELAYED RELEASE ORAL at 09:03

## 2022-01-01 RX ADMIN — Medication 120 MILLIGRAM(S): at 05:24

## 2022-01-01 RX ADMIN — SEVELAMER CARBONATE 1600 MILLIGRAM(S): 2400 POWDER, FOR SUSPENSION ORAL at 08:55

## 2022-01-01 RX ADMIN — Medication 25 MILLIGRAM(S): at 05:59

## 2022-01-01 RX ADMIN — Medication 1 DROP(S): at 01:33

## 2022-01-01 RX ADMIN — Medication 81 MILLIGRAM(S): at 11:54

## 2022-01-01 RX ADMIN — LIDOCAINE 1 PATCH: 4 CREAM TOPICAL at 12:42

## 2022-01-01 RX ADMIN — Medication 2: at 13:09

## 2022-01-01 RX ADMIN — Medication 1 APPLICATION(S): at 11:57

## 2022-01-01 RX ADMIN — Medication 25 MILLIGRAM(S): at 06:43

## 2022-01-01 RX ADMIN — CHLORHEXIDINE GLUCONATE 1 APPLICATION(S): 213 SOLUTION TOPICAL at 19:39

## 2022-01-01 RX ADMIN — Medication 4: at 23:46

## 2022-01-01 RX ADMIN — Medication 1 APPLICATION(S): at 12:10

## 2022-01-01 RX ADMIN — Medication 81 MILLIGRAM(S): at 12:38

## 2022-01-01 RX ADMIN — ERYTHROPOIETIN 20000 UNIT(S): 10000 INJECTION, SOLUTION INTRAVENOUS; SUBCUTANEOUS at 14:13

## 2022-01-01 RX ADMIN — LIDOCAINE 1 PATCH: 4 CREAM TOPICAL at 12:02

## 2022-01-01 RX ADMIN — SEVELAMER CARBONATE 1600 MILLIGRAM(S): 2400 POWDER, FOR SUSPENSION ORAL at 17:14

## 2022-01-01 RX ADMIN — SENNA PLUS 2 TABLET(S): 8.6 TABLET ORAL at 21:42

## 2022-01-01 RX ADMIN — Medication 81 MILLIGRAM(S): at 12:58

## 2022-01-01 RX ADMIN — PANTOPRAZOLE SODIUM 10 MG/HR: 20 TABLET, DELAYED RELEASE ORAL at 19:04

## 2022-01-01 RX ADMIN — LIDOCAINE 1 PATCH: 4 CREAM TOPICAL at 00:19

## 2022-01-01 RX ADMIN — Medication 1 DROP(S): at 14:14

## 2022-01-01 RX ADMIN — APIXABAN 5 MILLIGRAM(S): 2.5 TABLET, FILM COATED ORAL at 17:09

## 2022-01-01 RX ADMIN — Medication 1: at 09:19

## 2022-01-01 RX ADMIN — Medication 10 MILLIGRAM(S): at 17:27

## 2022-01-01 RX ADMIN — LIDOCAINE 1 PATCH: 4 CREAM TOPICAL at 19:47

## 2022-01-01 RX ADMIN — HEPARIN SODIUM 2700 UNIT(S)/HR: 5000 INJECTION INTRAVENOUS; SUBCUTANEOUS at 18:45

## 2022-01-01 RX ADMIN — Medication 1 DROP(S): at 02:52

## 2022-01-01 RX ADMIN — LIDOCAINE 1 PATCH: 4 CREAM TOPICAL at 01:05

## 2022-01-01 RX ADMIN — Medication 81 MILLIGRAM(S): at 12:30

## 2022-01-01 RX ADMIN — Medication 81 MILLIGRAM(S): at 13:25

## 2022-01-01 RX ADMIN — Medication 25 MILLIGRAM(S): at 21:53

## 2022-01-01 RX ADMIN — Medication 20 MILLIEQUIVALENT(S): at 13:02

## 2022-01-01 RX ADMIN — Medication 1 APPLICATION(S): at 21:45

## 2022-01-01 RX ADMIN — Medication 100 MILLIGRAM(S): at 13:37

## 2022-01-01 RX ADMIN — ERYTHROPOIETIN 20000 UNIT(S): 10000 INJECTION, SOLUTION INTRAVENOUS; SUBCUTANEOUS at 12:23

## 2022-01-01 RX ADMIN — Medication 100 GRAM(S): at 01:13

## 2022-01-01 RX ADMIN — CHLORHEXIDINE GLUCONATE 1 APPLICATION(S): 213 SOLUTION TOPICAL at 10:42

## 2022-01-01 RX ADMIN — SEVELAMER CARBONATE 800 MILLIGRAM(S): 2400 POWDER, FOR SUSPENSION ORAL at 17:37

## 2022-01-01 RX ADMIN — Medication 81 MILLIGRAM(S): at 13:48

## 2022-01-01 RX ADMIN — Medication 3 MILLILITER(S): at 11:49

## 2022-01-01 RX ADMIN — LIDOCAINE 1 PATCH: 4 CREAM TOPICAL at 12:04

## 2022-01-01 RX ADMIN — CHLORHEXIDINE GLUCONATE 1 APPLICATION(S): 213 SOLUTION TOPICAL at 08:42

## 2022-01-01 RX ADMIN — Medication 1 APPLICATION(S): at 00:05

## 2022-01-01 RX ADMIN — SENNA PLUS 2 TABLET(S): 8.6 TABLET ORAL at 22:48

## 2022-01-01 RX ADMIN — CHLORHEXIDINE GLUCONATE 1 APPLICATION(S): 213 SOLUTION TOPICAL at 05:08

## 2022-01-01 RX ADMIN — Medication 1 DROP(S): at 00:10

## 2022-01-01 RX ADMIN — Medication 1: at 13:33

## 2022-01-01 RX ADMIN — HEPARIN SODIUM 5000 UNIT(S): 5000 INJECTION INTRAVENOUS; SUBCUTANEOUS at 02:44

## 2022-01-01 RX ADMIN — SEVELAMER CARBONATE 1600 MILLIGRAM(S): 2400 POWDER, FOR SUSPENSION ORAL at 10:32

## 2022-01-01 RX ADMIN — Medication 120 MILLIGRAM(S): at 06:55

## 2022-01-01 RX ADMIN — Medication 25 MILLIGRAM(S): at 11:59

## 2022-01-01 RX ADMIN — LIDOCAINE 1 PATCH: 4 CREAM TOPICAL at 21:24

## 2022-01-01 RX ADMIN — OXYCODONE AND ACETAMINOPHEN 1 TABLET(S): 5; 325 TABLET ORAL at 11:01

## 2022-01-01 RX ADMIN — Medication 100 MILLIGRAM(S): at 11:56

## 2022-01-01 RX ADMIN — Medication 1: at 09:10

## 2022-01-01 RX ADMIN — Medication 10 UNIT(S): at 08:15

## 2022-01-01 RX ADMIN — APIXABAN 5 MILLIGRAM(S): 2.5 TABLET, FILM COATED ORAL at 21:25

## 2022-01-01 RX ADMIN — Medication 100 MILLIGRAM(S): at 13:24

## 2022-01-01 RX ADMIN — Medication 25 MILLIGRAM(S): at 22:42

## 2022-01-01 RX ADMIN — LIDOCAINE 1 PATCH: 4 CREAM TOPICAL at 20:32

## 2022-01-01 RX ADMIN — SEVELAMER CARBONATE 1600 MILLIGRAM(S): 2400 POWDER, FOR SUSPENSION ORAL at 18:35

## 2022-01-01 RX ADMIN — SODIUM CHLORIDE 60 MILLILITER(S): 9 INJECTION INTRAMUSCULAR; INTRAVENOUS; SUBCUTANEOUS at 13:00

## 2022-01-01 RX ADMIN — Medication 12.5 MILLIGRAM(S): at 05:59

## 2022-01-01 RX ADMIN — Medication 81 MILLIGRAM(S): at 12:40

## 2022-01-01 RX ADMIN — DABIGATRAN ETEXILATE MESYLATE 150 MILLIGRAM(S): 150 CAPSULE ORAL at 23:39

## 2022-01-01 RX ADMIN — APIXABAN 5 MILLIGRAM(S): 2.5 TABLET, FILM COATED ORAL at 17:21

## 2022-01-01 RX ADMIN — APIXABAN 5 MILLIGRAM(S): 2.5 TABLET, FILM COATED ORAL at 05:21

## 2022-01-01 RX ADMIN — POLYETHYLENE GLYCOL 3350 17 GRAM(S): 17 POWDER, FOR SOLUTION ORAL at 06:36

## 2022-01-01 RX ADMIN — FENTANYL CITRATE 25 MICROGRAM(S): 50 INJECTION INTRAVENOUS at 12:15

## 2022-01-01 RX ADMIN — Medication 1 DROP(S): at 21:18

## 2022-01-01 RX ADMIN — FENTANYL CITRATE 50 MICROGRAM(S): 50 INJECTION INTRAVENOUS at 12:50

## 2022-01-01 RX ADMIN — Medication 1 APPLICATION(S): at 12:27

## 2022-01-01 RX ADMIN — Medication 120 MILLIGRAM(S): at 12:28

## 2022-01-01 RX ADMIN — Medication 100 MILLIGRAM(S): at 09:09

## 2022-01-01 RX ADMIN — Medication 2: at 12:57

## 2022-01-01 RX ADMIN — SODIUM CHLORIDE 250 MILLILITER(S): 9 INJECTION INTRAMUSCULAR; INTRAVENOUS; SUBCUTANEOUS at 14:27

## 2022-01-01 RX ADMIN — CHLORHEXIDINE GLUCONATE 1 APPLICATION(S): 213 SOLUTION TOPICAL at 13:45

## 2022-01-01 RX ADMIN — Medication 100 GRAM(S): at 01:56

## 2022-01-01 RX ADMIN — IRON SUCROSE 100 MILLIGRAM(S): 20 INJECTION, SOLUTION INTRAVENOUS at 05:56

## 2022-01-01 RX ADMIN — Medication 2: at 08:25

## 2022-01-01 RX ADMIN — Medication 120 MILLIGRAM(S): at 05:53

## 2022-01-01 RX ADMIN — HEPARIN SODIUM 2500 UNIT(S)/HR: 5000 INJECTION INTRAVENOUS; SUBCUTANEOUS at 16:35

## 2022-01-01 RX ADMIN — Medication 81 MILLIGRAM(S): at 12:12

## 2022-01-01 RX ADMIN — SEVELAMER CARBONATE 1600 MILLIGRAM(S): 2400 POWDER, FOR SUSPENSION ORAL at 17:32

## 2022-01-01 RX ADMIN — Medication 1000 MILLIGRAM(S): at 04:36

## 2022-01-01 RX ADMIN — Medication 240 MILLIGRAM(S): at 06:51

## 2022-01-01 RX ADMIN — Medication 2: at 08:47

## 2022-01-01 RX ADMIN — Medication 120 MILLIGRAM(S): at 23:39

## 2022-01-01 RX ADMIN — Medication 81 MILLIGRAM(S): at 13:38

## 2022-01-01 RX ADMIN — Medication 81 MILLIGRAM(S): at 17:59

## 2022-01-01 RX ADMIN — Medication 25 MILLIGRAM(S): at 05:44

## 2022-01-01 RX ADMIN — Medication 10 MILLIGRAM(S): at 23:14

## 2022-01-01 RX ADMIN — OXYCODONE AND ACETAMINOPHEN 1 TABLET(S): 5; 325 TABLET ORAL at 23:05

## 2022-01-01 RX ADMIN — Medication 81 MILLIGRAM(S): at 12:07

## 2022-01-01 RX ADMIN — IRON SUCROSE 100 MILLIGRAM(S): 20 INJECTION, SOLUTION INTRAVENOUS at 13:22

## 2022-01-01 RX ADMIN — ATORVASTATIN CALCIUM 40 MILLIGRAM(S): 80 TABLET, FILM COATED ORAL at 22:02

## 2022-01-01 RX ADMIN — LIDOCAINE 1 PATCH: 4 CREAM TOPICAL at 17:14

## 2022-01-01 RX ADMIN — CHLORHEXIDINE GLUCONATE 1 APPLICATION(S): 213 SOLUTION TOPICAL at 08:52

## 2022-01-01 RX ADMIN — SEVELAMER CARBONATE 1600 MILLIGRAM(S): 2400 POWDER, FOR SUSPENSION ORAL at 18:10

## 2022-01-01 RX ADMIN — SEVELAMER CARBONATE 1600 MILLIGRAM(S): 2400 POWDER, FOR SUSPENSION ORAL at 16:59

## 2022-01-01 RX ADMIN — HEPARIN SODIUM 2400 UNIT(S)/HR: 5000 INJECTION INTRAVENOUS; SUBCUTANEOUS at 20:03

## 2022-01-01 RX ADMIN — Medication 81 MILLIGRAM(S): at 12:45

## 2022-01-01 RX ADMIN — SODIUM CHLORIDE 125 MILLILITER(S): 9 INJECTION INTRAMUSCULAR; INTRAVENOUS; SUBCUTANEOUS at 13:15

## 2022-01-01 RX ADMIN — HEPARIN SODIUM 4200 UNIT(S)/HR: 5000 INJECTION INTRAVENOUS; SUBCUTANEOUS at 02:25

## 2022-01-01 RX ADMIN — ATORVASTATIN CALCIUM 40 MILLIGRAM(S): 80 TABLET, FILM COATED ORAL at 23:14

## 2022-01-01 RX ADMIN — SENNA PLUS 2 TABLET(S): 8.6 TABLET ORAL at 21:56

## 2022-01-01 RX ADMIN — Medication 100 MILLIGRAM(S): at 12:47

## 2022-01-01 RX ADMIN — ATORVASTATIN CALCIUM 40 MILLIGRAM(S): 80 TABLET, FILM COATED ORAL at 22:50

## 2022-01-01 RX ADMIN — Medication 1 DROP(S): at 23:30

## 2022-01-01 RX ADMIN — Medication 25 MILLIGRAM(S): at 17:46

## 2022-01-01 RX ADMIN — Medication 1 APPLICATION(S): at 00:20

## 2022-01-01 RX ADMIN — Medication 1 TABLET(S): at 12:44

## 2022-01-01 RX ADMIN — APIXABAN 5 MILLIGRAM(S): 2.5 TABLET, FILM COATED ORAL at 17:55

## 2022-01-01 RX ADMIN — Medication 1: at 17:14

## 2022-01-01 RX ADMIN — Medication 25 MILLIGRAM(S): at 22:40

## 2022-01-01 RX ADMIN — Medication 25 MILLIGRAM(S): at 14:02

## 2022-01-01 RX ADMIN — SEVELAMER CARBONATE 1600 MILLIGRAM(S): 2400 POWDER, FOR SUSPENSION ORAL at 21:51

## 2022-01-01 RX ADMIN — POLYETHYLENE GLYCOL 3350 17 GRAM(S): 17 POWDER, FOR SOLUTION ORAL at 18:01

## 2022-01-01 RX ADMIN — Medication 81 MILLIGRAM(S): at 14:08

## 2022-01-01 RX ADMIN — SEVELAMER CARBONATE 1600 MILLIGRAM(S): 2400 POWDER, FOR SUSPENSION ORAL at 17:31

## 2022-01-01 RX ADMIN — LIDOCAINE 1 PATCH: 4 CREAM TOPICAL at 11:35

## 2022-01-01 RX ADMIN — Medication 1 DROP(S): at 05:26

## 2022-01-01 RX ADMIN — Medication 100 MILLIGRAM(S): at 12:32

## 2022-01-01 RX ADMIN — POLYETHYLENE GLYCOL 3350 17 GRAM(S): 17 POWDER, FOR SOLUTION ORAL at 17:14

## 2022-01-01 RX ADMIN — INSULIN GLARGINE 25 UNIT(S): 100 INJECTION, SOLUTION SUBCUTANEOUS at 22:35

## 2022-01-01 RX ADMIN — Medication 100 MILLIGRAM(S): at 12:20

## 2022-01-01 RX ADMIN — PANTOPRAZOLE SODIUM 40 MILLIGRAM(S): 20 TABLET, DELAYED RELEASE ORAL at 11:10

## 2022-01-01 RX ADMIN — Medication 10 MG/HR: at 23:14

## 2022-01-01 RX ADMIN — SEVELAMER CARBONATE 1600 MILLIGRAM(S): 2400 POWDER, FOR SUSPENSION ORAL at 17:36

## 2022-01-01 RX ADMIN — CHLORHEXIDINE GLUCONATE 1 APPLICATION(S): 213 SOLUTION TOPICAL at 05:25

## 2022-01-01 RX ADMIN — HUMAN INSULIN 6 UNIT(S): 100 INJECTION, SUSPENSION SUBCUTANEOUS at 01:05

## 2022-01-01 RX ADMIN — SEVELAMER CARBONATE 1600 MILLIGRAM(S): 2400 POWDER, FOR SUSPENSION ORAL at 17:57

## 2022-01-01 RX ADMIN — SODIUM CHLORIDE 50 MILLILITER(S): 9 INJECTION INTRAMUSCULAR; INTRAVENOUS; SUBCUTANEOUS at 09:07

## 2022-01-01 RX ADMIN — Medication 50 MILLIGRAM(S): at 05:59

## 2022-01-01 RX ADMIN — Medication 2: at 07:33

## 2022-01-01 RX ADMIN — APIXABAN 5 MILLIGRAM(S): 2.5 TABLET, FILM COATED ORAL at 18:45

## 2022-01-01 RX ADMIN — SENNA PLUS 2 TABLET(S): 8.6 TABLET ORAL at 22:58

## 2022-01-01 RX ADMIN — ATORVASTATIN CALCIUM 40 MILLIGRAM(S): 80 TABLET, FILM COATED ORAL at 23:34

## 2022-01-01 RX ADMIN — PANTOPRAZOLE SODIUM 40 MILLIGRAM(S): 20 TABLET, DELAYED RELEASE ORAL at 06:52

## 2022-01-01 RX ADMIN — HYDROMORPHONE HYDROCHLORIDE 1 MILLIGRAM(S): 2 INJECTION INTRAMUSCULAR; INTRAVENOUS; SUBCUTANEOUS at 06:06

## 2022-01-01 RX ADMIN — Medication 100 MILLIGRAM(S): at 14:08

## 2022-01-01 RX ADMIN — Medication 100 MILLIGRAM(S): at 05:41

## 2022-01-01 RX ADMIN — Medication 120 MILLIGRAM(S): at 05:10

## 2022-01-01 RX ADMIN — Medication 120 MILLIGRAM(S): at 08:40

## 2022-01-01 RX ADMIN — Medication 1 TABLET(S): at 11:24

## 2022-01-01 RX ADMIN — Medication 25 MILLIGRAM(S): at 05:04

## 2022-01-01 RX ADMIN — Medication 5 MILLIGRAM(S): at 18:24

## 2022-01-01 RX ADMIN — Medication 10 UNIT(S): at 18:28

## 2022-01-01 RX ADMIN — APIXABAN 5 MILLIGRAM(S): 2.5 TABLET, FILM COATED ORAL at 13:06

## 2022-01-01 RX ADMIN — QUETIAPINE FUMARATE 25 MILLIGRAM(S): 200 TABLET, FILM COATED ORAL at 21:27

## 2022-01-01 RX ADMIN — CHLORHEXIDINE GLUCONATE 1 APPLICATION(S): 213 SOLUTION TOPICAL at 12:31

## 2022-01-01 RX ADMIN — POTASSIUM PHOSPHATE, MONOBASIC POTASSIUM PHOSPHATE, DIBASIC 62.5 MILLIMOLE(S): 236; 224 INJECTION, SOLUTION INTRAVENOUS at 01:56

## 2022-01-01 RX ADMIN — LOSARTAN POTASSIUM 25 MILLIGRAM(S): 100 TABLET, FILM COATED ORAL at 05:55

## 2022-01-01 RX ADMIN — Medication 1 MILLIGRAM(S): at 14:52

## 2022-01-01 RX ADMIN — Medication 1 DROP(S): at 11:41

## 2022-01-01 RX ADMIN — Medication 1000 MILLIGRAM(S): at 07:59

## 2022-01-01 RX ADMIN — Medication 81 MILLIGRAM(S): at 11:25

## 2022-01-01 RX ADMIN — Medication 81 MILLIGRAM(S): at 12:32

## 2022-01-01 RX ADMIN — Medication 120 MILLIGRAM(S): at 21:53

## 2022-01-01 RX ADMIN — HEPARIN SODIUM 3800 UNIT(S)/HR: 5000 INJECTION INTRAVENOUS; SUBCUTANEOUS at 00:35

## 2022-01-01 RX ADMIN — Medication 81 MILLIGRAM(S): at 12:13

## 2022-01-01 RX ADMIN — Medication 1: at 17:28

## 2022-01-01 RX ADMIN — APIXABAN 2.5 MILLIGRAM(S): 2.5 TABLET, FILM COATED ORAL at 09:04

## 2022-01-01 RX ADMIN — HEPARIN SODIUM 2700 UNIT(S)/HR: 5000 INJECTION INTRAVENOUS; SUBCUTANEOUS at 06:59

## 2022-01-01 RX ADMIN — Medication 10 MILLIGRAM(S): at 17:32

## 2022-01-01 RX ADMIN — Medication 5 MILLIGRAM(S): at 18:45

## 2022-01-01 RX ADMIN — Medication 25 MILLIGRAM(S): at 06:52

## 2022-01-01 RX ADMIN — Medication 3: at 17:30

## 2022-01-01 RX ADMIN — ATORVASTATIN CALCIUM 40 MILLIGRAM(S): 80 TABLET, FILM COATED ORAL at 22:58

## 2022-01-01 RX ADMIN — SEVELAMER CARBONATE 1600 MILLIGRAM(S): 2400 POWDER, FOR SUSPENSION ORAL at 22:20

## 2022-01-01 RX ADMIN — Medication 100 MILLIGRAM(S): at 17:33

## 2022-01-01 RX ADMIN — Medication 100 MILLIGRAM(S): at 08:53

## 2022-01-01 RX ADMIN — Medication 10 MILLIGRAM(S): at 12:54

## 2022-01-01 RX ADMIN — ATORVASTATIN CALCIUM 40 MILLIGRAM(S): 80 TABLET, FILM COATED ORAL at 21:04

## 2022-01-01 RX ADMIN — QUETIAPINE FUMARATE 25 MILLIGRAM(S): 200 TABLET, FILM COATED ORAL at 22:06

## 2022-01-01 RX ADMIN — HEPARIN SODIUM 1600 UNIT(S)/HR: 5000 INJECTION INTRAVENOUS; SUBCUTANEOUS at 14:35

## 2022-01-01 RX ADMIN — OXYCODONE AND ACETAMINOPHEN 1 TABLET(S): 5; 325 TABLET ORAL at 01:10

## 2022-01-01 RX ADMIN — Medication 1 APPLICATION(S): at 12:49

## 2022-01-01 RX ADMIN — SODIUM CHLORIDE 4 MILLILITER(S): 9 INJECTION INTRAMUSCULAR; INTRAVENOUS; SUBCUTANEOUS at 05:15

## 2022-01-01 RX ADMIN — CLOPIDOGREL BISULFATE 75 MILLIGRAM(S): 75 TABLET, FILM COATED ORAL at 13:48

## 2022-01-01 RX ADMIN — BUMETANIDE 2 MILLIGRAM(S): 0.25 INJECTION INTRAMUSCULAR; INTRAVENOUS at 22:41

## 2022-01-01 RX ADMIN — ATORVASTATIN CALCIUM 40 MILLIGRAM(S): 80 TABLET, FILM COATED ORAL at 23:11

## 2022-01-01 RX ADMIN — Medication 1: at 09:17

## 2022-01-01 RX ADMIN — CHLORHEXIDINE GLUCONATE 1 APPLICATION(S): 213 SOLUTION TOPICAL at 09:23

## 2022-01-01 RX ADMIN — LIDOCAINE 1 PATCH: 4 CREAM TOPICAL at 23:17

## 2022-01-01 RX ADMIN — Medication 100 MILLIGRAM(S): at 11:30

## 2022-01-01 RX ADMIN — LIDOCAINE 1 PATCH: 4 CREAM TOPICAL at 13:00

## 2022-01-01 RX ADMIN — Medication 100 MILLIGRAM(S): at 06:36

## 2022-01-01 RX ADMIN — Medication 120 MILLIGRAM(S): at 06:52

## 2022-01-01 RX ADMIN — Medication 1 TABLET(S): at 13:25

## 2022-01-01 RX ADMIN — Medication 25 MILLIGRAM(S): at 22:02

## 2022-01-01 RX ADMIN — Medication 1: at 17:26

## 2022-01-01 RX ADMIN — Medication 2: at 11:56

## 2022-01-01 RX ADMIN — Medication 25 MILLIGRAM(S): at 13:54

## 2022-01-01 RX ADMIN — SEVELAMER CARBONATE 1600 MILLIGRAM(S): 2400 POWDER, FOR SUSPENSION ORAL at 17:12

## 2022-01-01 RX ADMIN — APIXABAN 5 MILLIGRAM(S): 2.5 TABLET, FILM COATED ORAL at 17:30

## 2022-01-01 RX ADMIN — SEVELAMER CARBONATE 1600 MILLIGRAM(S): 2400 POWDER, FOR SUSPENSION ORAL at 13:00

## 2022-01-01 RX ADMIN — SEVELAMER CARBONATE 1600 MILLIGRAM(S): 2400 POWDER, FOR SUSPENSION ORAL at 13:08

## 2022-01-01 RX ADMIN — Medication 100 GRAM(S): at 03:54

## 2022-01-01 RX ADMIN — Medication 100 MILLIGRAM(S): at 06:00

## 2022-01-01 RX ADMIN — Medication 1 DROP(S): at 23:17

## 2022-01-01 RX ADMIN — HEPARIN SODIUM 2400 UNIT(S)/HR: 5000 INJECTION INTRAVENOUS; SUBCUTANEOUS at 19:16

## 2022-01-01 RX ADMIN — SEVELAMER CARBONATE 1600 MILLIGRAM(S): 2400 POWDER, FOR SUSPENSION ORAL at 08:47

## 2022-01-01 RX ADMIN — SENNA PLUS 2 TABLET(S): 8.6 TABLET ORAL at 21:15

## 2022-01-01 RX ADMIN — HEPARIN SODIUM 2200 UNIT(S)/HR: 5000 INJECTION INTRAVENOUS; SUBCUTANEOUS at 23:42

## 2022-01-01 RX ADMIN — Medication 1 APPLICATION(S): at 12:00

## 2022-01-01 RX ADMIN — Medication 100 MILLIGRAM(S): at 05:46

## 2022-01-01 RX ADMIN — HEPARIN SODIUM 2200 UNIT(S)/HR: 5000 INJECTION INTRAVENOUS; SUBCUTANEOUS at 06:21

## 2022-01-01 RX ADMIN — LIDOCAINE 1 PATCH: 4 CREAM TOPICAL at 12:58

## 2022-01-01 RX ADMIN — SEVELAMER CARBONATE 1600 MILLIGRAM(S): 2400 POWDER, FOR SUSPENSION ORAL at 13:42

## 2022-01-01 RX ADMIN — Medication 100 MILLIGRAM(S): at 14:26

## 2022-01-01 RX ADMIN — Medication 100 MILLIGRAM(S): at 12:12

## 2022-01-01 RX ADMIN — Medication 100 MILLIGRAM(S): at 13:48

## 2022-01-01 RX ADMIN — Medication 1 APPLICATION(S): at 17:17

## 2022-01-01 RX ADMIN — POLYETHYLENE GLYCOL 3350 17 GRAM(S): 17 POWDER, FOR SOLUTION ORAL at 06:10

## 2022-01-01 RX ADMIN — HEPARIN SODIUM 2400 UNIT(S)/HR: 5000 INJECTION INTRAVENOUS; SUBCUTANEOUS at 08:49

## 2022-01-01 RX ADMIN — Medication 10 MILLIGRAM(S): at 00:11

## 2022-01-01 RX ADMIN — Medication 60 MILLIGRAM(S): at 17:49

## 2022-01-01 RX ADMIN — Medication 1 APPLICATION(S): at 12:53

## 2022-01-01 RX ADMIN — Medication 100 MILLIGRAM(S): at 23:50

## 2022-01-01 RX ADMIN — CHLORHEXIDINE GLUCONATE 1 APPLICATION(S): 213 SOLUTION TOPICAL at 09:00

## 2022-01-01 RX ADMIN — MIDAZOLAM HYDROCHLORIDE 5 MILLIGRAM(S): 1 INJECTION, SOLUTION INTRAMUSCULAR; INTRAVENOUS at 12:45

## 2022-01-01 RX ADMIN — POLYETHYLENE GLYCOL 3350 17 GRAM(S): 17 POWDER, FOR SOLUTION ORAL at 05:21

## 2022-01-01 RX ADMIN — Medication 10 MILLIGRAM(S): at 12:49

## 2022-01-01 RX ADMIN — OXYCODONE AND ACETAMINOPHEN 1 TABLET(S): 5; 325 TABLET ORAL at 10:57

## 2022-01-01 RX ADMIN — Medication 5 MILLIGRAM(S): at 00:13

## 2022-01-01 RX ADMIN — Medication 1 DROP(S): at 00:24

## 2022-01-01 RX ADMIN — CHLORHEXIDINE GLUCONATE 1 APPLICATION(S): 213 SOLUTION TOPICAL at 17:00

## 2022-01-01 RX ADMIN — Medication 1 APPLICATION(S): at 12:35

## 2022-01-01 RX ADMIN — HEPARIN SODIUM 2400 UNIT(S)/HR: 5000 INJECTION INTRAVENOUS; SUBCUTANEOUS at 18:29

## 2022-01-01 RX ADMIN — ATORVASTATIN CALCIUM 40 MILLIGRAM(S): 80 TABLET, FILM COATED ORAL at 22:28

## 2022-01-01 RX ADMIN — Medication 30 MILLIGRAM(S): at 12:59

## 2022-01-01 RX ADMIN — Medication 50 MILLIGRAM(S): at 17:34

## 2022-01-01 RX ADMIN — APIXABAN 5 MILLIGRAM(S): 2.5 TABLET, FILM COATED ORAL at 06:29

## 2022-01-01 RX ADMIN — FENTANYL CITRATE 25 MICROGRAM(S): 50 INJECTION INTRAVENOUS at 00:49

## 2022-01-01 RX ADMIN — Medication 25 MILLIGRAM(S): at 17:57

## 2022-01-01 RX ADMIN — Medication 4: at 00:08

## 2022-01-01 RX ADMIN — SEVELAMER CARBONATE 1600 MILLIGRAM(S): 2400 POWDER, FOR SUSPENSION ORAL at 18:24

## 2022-01-01 RX ADMIN — Medication 5 MG/HR: at 14:35

## 2022-01-01 RX ADMIN — Medication 100 MILLIGRAM(S): at 13:44

## 2022-01-01 RX ADMIN — LIDOCAINE 1 PATCH: 4 CREAM TOPICAL at 12:19

## 2022-01-01 RX ADMIN — SEVELAMER CARBONATE 1600 MILLIGRAM(S): 2400 POWDER, FOR SUSPENSION ORAL at 18:36

## 2022-01-01 RX ADMIN — CHLORHEXIDINE GLUCONATE 1 APPLICATION(S): 213 SOLUTION TOPICAL at 06:35

## 2022-01-01 RX ADMIN — Medication 100 MILLIGRAM(S): at 13:40

## 2022-01-01 RX ADMIN — Medication 25 MILLIGRAM(S): at 22:46

## 2022-01-01 RX ADMIN — Medication 120 MILLIGRAM(S): at 22:02

## 2022-01-01 RX ADMIN — Medication 1 PACKET(S): at 02:44

## 2022-01-01 RX ADMIN — Medication 100 MILLIGRAM(S): at 12:35

## 2022-01-01 RX ADMIN — IRON SUCROSE 100 MILLIGRAM(S): 20 INJECTION, SOLUTION INTRAVENOUS at 14:58

## 2022-01-01 RX ADMIN — Medication 10 UNIT(S): at 18:04

## 2022-01-01 RX ADMIN — ATORVASTATIN CALCIUM 40 MILLIGRAM(S): 80 TABLET, FILM COATED ORAL at 22:40

## 2022-01-01 RX ADMIN — ERYTHROPOIETIN 10000 UNIT(S): 10000 INJECTION, SOLUTION INTRAVENOUS; SUBCUTANEOUS at 14:51

## 2022-01-01 RX ADMIN — Medication 120 MILLIGRAM(S): at 06:46

## 2022-01-01 RX ADMIN — ERYTHROPOIETIN 20000 UNIT(S): 10000 INJECTION, SOLUTION INTRAVENOUS; SUBCUTANEOUS at 19:38

## 2022-01-01 RX ADMIN — Medication 2: at 17:36

## 2022-01-01 RX ADMIN — PANTOPRAZOLE SODIUM 40 MILLIGRAM(S): 20 TABLET, DELAYED RELEASE ORAL at 17:34

## 2022-01-01 RX ADMIN — CHLORHEXIDINE GLUCONATE 1 APPLICATION(S): 213 SOLUTION TOPICAL at 09:39

## 2022-01-01 RX ADMIN — Medication 300 MILLIGRAM(S): at 23:52

## 2022-01-01 RX ADMIN — APIXABAN 5 MILLIGRAM(S): 2.5 TABLET, FILM COATED ORAL at 18:35

## 2022-01-01 RX ADMIN — HEPARIN SODIUM 2200 UNIT(S)/HR: 5000 INJECTION INTRAVENOUS; SUBCUTANEOUS at 08:43

## 2022-01-01 RX ADMIN — Medication 120 MILLIGRAM(S): at 22:40

## 2022-01-01 RX ADMIN — Medication 100 MILLIGRAM(S): at 05:02

## 2022-01-01 RX ADMIN — APIXABAN 5 MILLIGRAM(S): 2.5 TABLET, FILM COATED ORAL at 02:16

## 2022-01-01 RX ADMIN — ATORVASTATIN CALCIUM 40 MILLIGRAM(S): 80 TABLET, FILM COATED ORAL at 21:40

## 2022-01-01 RX ADMIN — LIDOCAINE 1 PATCH: 4 CREAM TOPICAL at 01:08

## 2022-01-01 RX ADMIN — CHLORHEXIDINE GLUCONATE 1 APPLICATION(S): 213 SOLUTION TOPICAL at 09:03

## 2022-01-01 RX ADMIN — Medication 180 MILLIGRAM(S): at 05:41

## 2022-01-01 RX ADMIN — Medication 3 MILLILITER(S): at 05:05

## 2022-01-01 RX ADMIN — SEVELAMER CARBONATE 1600 MILLIGRAM(S): 2400 POWDER, FOR SUSPENSION ORAL at 08:46

## 2022-01-01 RX ADMIN — CHLORHEXIDINE GLUCONATE 1 APPLICATION(S): 213 SOLUTION TOPICAL at 05:35

## 2022-01-01 RX ADMIN — SEVELAMER CARBONATE 1600 MILLIGRAM(S): 2400 POWDER, FOR SUSPENSION ORAL at 13:09

## 2022-01-01 RX ADMIN — Medication 1 TABLET(S): at 17:44

## 2022-01-01 RX ADMIN — PHENYLEPHRINE HYDROCHLORIDE 22.7 MICROGRAM(S)/KG/MIN: 10 INJECTION INTRAVENOUS at 09:39

## 2022-01-01 RX ADMIN — HEPARIN SODIUM 1900 UNIT(S)/HR: 5000 INJECTION INTRAVENOUS; SUBCUTANEOUS at 16:08

## 2022-01-01 RX ADMIN — LIDOCAINE 1 PATCH: 4 CREAM TOPICAL at 12:32

## 2022-01-01 RX ADMIN — Medication 20 MILLIEQUIVALENT(S): at 17:34

## 2022-01-01 RX ADMIN — Medication 25 MILLIGRAM(S): at 06:00

## 2022-01-01 RX ADMIN — Medication 40 MILLIEQUIVALENT(S): at 01:52

## 2022-01-01 RX ADMIN — CHLORHEXIDINE GLUCONATE 1 APPLICATION(S): 213 SOLUTION TOPICAL at 05:14

## 2022-01-01 RX ADMIN — Medication 25 MILLIGRAM(S): at 05:14

## 2022-01-01 RX ADMIN — Medication 1 DROP(S): at 06:43

## 2022-01-01 RX ADMIN — Medication 240 MILLIGRAM(S): at 06:28

## 2022-01-01 RX ADMIN — ERYTHROPOIETIN 20000 UNIT(S): 10000 INJECTION, SOLUTION INTRAVENOUS; SUBCUTANEOUS at 13:23

## 2022-01-01 RX ADMIN — SEVELAMER CARBONATE 1600 MILLIGRAM(S): 2400 POWDER, FOR SUSPENSION ORAL at 08:53

## 2022-01-01 RX ADMIN — APIXABAN 5 MILLIGRAM(S): 2.5 TABLET, FILM COATED ORAL at 05:42

## 2022-01-01 RX ADMIN — Medication 25 MILLIGRAM(S): at 15:18

## 2022-01-01 RX ADMIN — SODIUM CHLORIDE 40 MILLILITER(S): 9 INJECTION INTRAMUSCULAR; INTRAVENOUS; SUBCUTANEOUS at 08:43

## 2022-01-01 RX ADMIN — CHLORHEXIDINE GLUCONATE 1 APPLICATION(S): 213 SOLUTION TOPICAL at 06:52

## 2022-01-01 RX ADMIN — APIXABAN 5 MILLIGRAM(S): 2.5 TABLET, FILM COATED ORAL at 05:05

## 2022-01-01 RX ADMIN — QUETIAPINE FUMARATE 25 MILLIGRAM(S): 200 TABLET, FILM COATED ORAL at 21:57

## 2022-01-01 RX ADMIN — Medication 1 MILLIGRAM(S): at 19:57

## 2022-01-01 RX ADMIN — OXYCODONE AND ACETAMINOPHEN 1 TABLET(S): 5; 325 TABLET ORAL at 10:06

## 2022-01-01 RX ADMIN — Medication 1 DROP(S): at 09:28

## 2022-01-01 RX ADMIN — CHLORHEXIDINE GLUCONATE 1 APPLICATION(S): 213 SOLUTION TOPICAL at 05:22

## 2022-01-01 RX ADMIN — SEVELAMER CARBONATE 800 MILLIGRAM(S): 2400 POWDER, FOR SUSPENSION ORAL at 08:30

## 2022-01-01 RX ADMIN — Medication 1 DROP(S): at 00:19

## 2022-01-01 RX ADMIN — Medication 7.5 MILLIGRAM(S): at 06:48

## 2022-01-01 RX ADMIN — Medication 1 DROP(S): at 17:58

## 2022-01-01 RX ADMIN — Medication 1 DROP(S): at 09:31

## 2022-01-01 RX ADMIN — SEVELAMER CARBONATE 800 MILLIGRAM(S): 2400 POWDER, FOR SUSPENSION ORAL at 11:32

## 2022-01-01 RX ADMIN — Medication 25 MILLIGRAM(S): at 06:02

## 2022-01-01 RX ADMIN — SEVELAMER CARBONATE 800 MILLIGRAM(S): 2400 POWDER, FOR SUSPENSION ORAL at 09:20

## 2022-01-01 RX ADMIN — Medication 1: at 17:05

## 2022-01-01 RX ADMIN — ATORVASTATIN CALCIUM 40 MILLIGRAM(S): 80 TABLET, FILM COATED ORAL at 23:52

## 2022-01-01 RX ADMIN — OXYCODONE AND ACETAMINOPHEN 1 TABLET(S): 5; 325 TABLET ORAL at 01:03

## 2022-01-01 RX ADMIN — CHLORHEXIDINE GLUCONATE 1 APPLICATION(S): 213 SOLUTION TOPICAL at 13:25

## 2022-01-01 RX ADMIN — HUMAN INSULIN 6 UNIT(S): 100 INJECTION, SUSPENSION SUBCUTANEOUS at 11:31

## 2022-01-01 RX ADMIN — Medication 4: at 11:31

## 2022-01-01 RX ADMIN — Medication 1 DROP(S): at 21:09

## 2022-01-01 RX ADMIN — LIDOCAINE 1 PATCH: 4 CREAM TOPICAL at 00:17

## 2022-01-01 RX ADMIN — Medication 100 MILLIGRAM(S): at 05:22

## 2022-01-01 RX ADMIN — Medication 50 MILLIGRAM(S): at 18:10

## 2022-01-01 RX ADMIN — HEPARIN SODIUM 10000 UNIT(S): 5000 INJECTION INTRAVENOUS; SUBCUTANEOUS at 11:26

## 2022-01-01 RX ADMIN — Medication 2: at 14:26

## 2022-01-01 RX ADMIN — Medication 240 MILLIGRAM(S): at 06:02

## 2022-01-01 RX ADMIN — Medication 2: at 08:31

## 2022-01-01 RX ADMIN — HEPARIN SODIUM 10000 UNIT(S): 5000 INJECTION INTRAVENOUS; SUBCUTANEOUS at 02:05

## 2022-01-01 RX ADMIN — CHLORHEXIDINE GLUCONATE 1 APPLICATION(S): 213 SOLUTION TOPICAL at 05:24

## 2022-01-01 RX ADMIN — Medication 1 DROP(S): at 05:58

## 2022-01-01 RX ADMIN — Medication 120 MILLIGRAM(S): at 05:04

## 2022-01-01 RX ADMIN — HYDROMORPHONE HYDROCHLORIDE 1 MILLIGRAM(S): 2 INJECTION INTRAMUSCULAR; INTRAVENOUS; SUBCUTANEOUS at 22:45

## 2022-01-01 RX ADMIN — APIXABAN 5 MILLIGRAM(S): 2.5 TABLET, FILM COATED ORAL at 05:44

## 2022-01-01 RX ADMIN — Medication 30 MILLIGRAM(S): at 09:11

## 2022-01-01 RX ADMIN — LIDOCAINE 1 PATCH: 4 CREAM TOPICAL at 22:57

## 2022-01-01 RX ADMIN — Medication 12 UNIT(S): at 13:49

## 2022-01-01 RX ADMIN — Medication 25 MILLIGRAM(S): at 11:45

## 2022-02-18 NOTE — CONSULT NOTE ADULT - SUBJECTIVE AND OBJECTIVE BOX
CARDIOLOGY CONSULT NOTE - DR. SAGASTUME    HPI:   68 y/o male with a past medical history of HTN, DMT2 c/b LLE neuropathy, CVA/TIA, Afib (on Pradaxa , MI, CAD (NSTEMI) x 3 stents in 2014 to LAD, and proximal and distal LCx, ischemic cardiomyopathy, chronic unhealing RLE wound s/p angio, left CEA (2014), CKD presents with acute onset chest pain, pleuritic and inc dyspnea  ran out of metoprolol past 3 days and noticed increased heart rate associated with chest pain, dyspnea  Patient denies any active chest pain, palpitations, cough, syncope,  exertional symptoms, nausea, abdominal pain, fever, chills,  or rash.               presents with dizziness      PAST MEDICAL & SURGICAL HISTORY:  HTN (hypertension), benign    HLD (hyperlipidemia)    DM type 2 (diabetes mellitus, type 2)    TIA (transient ischemic attack)    Atrial fibrillation    MI (myocardial infarction)  circa 2014    CAD (coronary artery disease)    Neuropathy    Status post angioplasty with stent  LULU x 3 2/7/2014    S/P carotid endarterectomy  left          PREVIOUS DIAGNOSTIC TESTING:    [ ] Echocardiogram:  [ ]  Catheterization:  [ ] Stress Test:  	    MEDICATIONS:    Home Medications:  acetaminophen 325 mg oral tablet: 2 tab(s) orally every 6 hours, As needed, Temp greater or equal to 38C (100.4F), Mild Pain (1 - 3) (27 Oct 2021 13:45)  allopurinol 100 mg oral tablet: 2 tab(s) orally once a day (27 Oct 2021 11:31)  aspirin 81 mg oral delayed release tablet: 1 tab(s) orally once a day (27 Oct 2021 11:31)  bumetanide 2 mg oral tablet: 1 tab(s) orally once a day (27 Oct 2021 11:31)  insulin glargine 100 units/mL subcutaneous solution: 29 unit(s) subcutaneous once a day (27 Oct 2021 13:45)  metOLazone 5 mg oral tablet: 1 tab(s) orally 3 times a week (27 Oct 2021 11:31)  metoprolol succinate 50 mg oral tablet, extended release: 1 tab(s) orally 2 times a day (27 Oct 2021 11:31)  NovoLOG FlexPen 100 units/mL injectable solution: 10 unit(s) subcutaneous 3 times a day (27 Oct 2021 11:31)  oxycodone-acetaminophen 5 mg-325 mg oral tablet: 1 tab(s) orally 2 times a day (27 Oct 2021 11:31)  Pradaxa 150 mg oral capsule: 1 cap(s) orally 2 times a day (27 Oct 2021 11:31)  pregabalin 100 mg oral capsule: 1 cap(s) orally 3 times a day (27 Oct 2021 11:31)      MEDICATIONS  (STANDING):      FAMILY HISTORY:  Family history of heart disease  father    Family history of CVA  mother        SOCIAL HISTORY:    [x ] Non-smoker  [ ] Smoker  [ ] Alcohol    Allergies    No Known Allergies    Intolerances    	    REVIEW OF SYSTEMS:  CONSTITUTIONAL: No fever, weight loss, or fatigue  EYES: No eye pain, visual disturbances, or discharge  ENMT:  No difficulty hearing, tinnitus, vertigo; No sinus or throat pain  NECK: No pain or stiffness  RESPIRATORY: No cough, wheezing, chills or hemoptysis;+ Shortness of Breath  CARDIOVASCULAR: as HPI  GASTROINTESTINAL: No abdominal or epigastric pain. No nausea, vomiting, or hematemesis; No diarrhea or constipation. No melena or hematochezia.  GENITOURINARY: No dysuria, frequency, hematuria, or incontinence  NEUROLOGICAL: No headaches, memory loss, loss of strength, numbness, or tremors  SKIN: No itching, burning, rashes, or lesions   	  [ ] All others negative	  [ ] Unable to obtain    PHYSICAL EXAM:    T(C): 36.3 (02-18-22 @ 13:42), Max: 36.3 (02-18-22 @ 13:42)  HR: 108 (02-18-22 @ 13:42) (108 - 108)  BP: 136/73 (02-18-22 @ 13:42) (136/73 - 136/73)  RR: 18 (02-18-22 @ 13:42) (18 - 18)  SpO2: 97% (02-18-22 @ 13:42) (97% - 97%)  Wt(kg): --  I&O's Summary    Daily Height in cm: 180.34 (18 Feb 2022 13:42)    Daily     Appearance: Normal	  Psychiatry: A & O x 3, Mood & affect appropriate  HEENT:   Normal oral mucosa, PERRL, EOMI	  Lymphatic: No lymphadenopathy  Cardiovascular: Normal S1 S2, irreg  tachy   Respiratory: Lungs clear to auscultation	dec bs bases b/l  Gastrointestinal:  Soft, Non-tender, + BS	  Skin: No rashes, No ecchymoses, No cyanosis	  Neurologic: Non-focal  Extremities: Normal range of motion, b/l edema   Vascular: Peripheral pulses palpable 2+ bilaterally    TELEMETRY: 	    ECG:  	not done yet  RADIOLOGY:  OTHER: 	  	  LABS:	 	    CARDIAC MARKERS:        proBNP:     Lipid Profile:   HgA1c:   TSH:                       ASSESSMENT/PLAN:

## 2022-02-18 NOTE — H&P ADULT - ASSESSMENT
67M with PMH of CAD, NSTEMI s/p 3 stents in 2014 (stents to LAD, proximal and distal LCx), ischemic cardiomyopathy, HTN, T2DM c/b peripheral neuropathy, CVA, TIA, Afib on Pradaxa, chronic RLE wound, L carotid stenosis s/p endarterectomy (2014) presents to the ED with acute onset chest pain admitted for further cardiac workup

## 2022-02-18 NOTE — ED PROVIDER NOTE - PHYSICAL EXAMINATION
GENERAL: no acute distress, non-toxic appearing  HEAD: normocephalic, atraumatic  HEENT: normal conjunctiva, oral mucosa moist, neck supple    CARDIAC: poor inspiratory effort    PULM: clear to ascultation bilaterally, no crackles, rales, rhonchi, or wheezing  GI: abdomen nondistended, soft, nontender, no guarding or rebound tenderness  : no CVA tenderness, no suprapubic tenderness  NEURO: alert and oriented x 3, normal speech, PERRLA, EOMI, no focal motor or sensory deficits  MSK: no visible deformities, 1+ trace b/l peripheral edema, calf tenderness/redness/swelling  SKIN: no visible rashes, dry, well-perfused  PSYCH: appropriate mood and affect

## 2022-02-18 NOTE — H&P ADULT - PROBLEM SELECTOR PLAN 6
Patient has CKD with Cr 2.05 on admission (previously 1.88 to 2.2 in 10/21)  - continue to monitor  - strict Is&Os Patient has a history of CHF  - continue diuresis with bumex, metolazone  - strict Is&Os  - daily weights  - continue aspirin, beta blocker, statin

## 2022-02-18 NOTE — H&P ADULT - PROBLEM SELECTOR PLAN 1
Patient presents with atypical chest pain, possibly 2/2 Afib. Chest pain now resolved. Troponin elevated to 932, BNP 09129. EKG shows Afib.  - cardiology recs appreciated  - will resume aspirin 81mg qd, metoprolol succinate 100mg qd  - continue to trend trops to peak  - f/u TTE to assess cardiac function  - ischemic eval per cardiology Patient presents with atypical chest pain, possibly 2/2 Afib. Chest pain now resolved. Troponin elevated to 932, BNP 38891. EKG shows Afib.  - cardiology recs appreciated  - will resume aspirin 81mg qd, metoprolol succinate 100mg qd  - elevated trop likely 2/2 Afib RVR vs CHF vs episodes of VT, also CKD  - continue to trend trops to peak  - f/u TTE to assess cardiac function  - ischemic eval per cardiology

## 2022-02-18 NOTE — H&P ADULT - PROBLEM SELECTOR PLAN 10
- DVT ppx: Pradaxa  - Diet: DASH/CC  - Dispo: pending clinical improvement T2DM on Novolog 10 TID (+ additional units if BG is higher), Basaglar 25U qhs at home  - Will resume home dose, admelog 10 TID, Lantus 25U qhs  - monitor blood glucose closely in the setting of CKD

## 2022-02-18 NOTE — H&P ADULT - PROBLEM SELECTOR PLAN 9
- DVT ppx: Pradaxa  - Diet: DASH/CC  - Dispo: pending clinical improvement T2DM on Novolog 10 TID (+ additional units if BG is higher), Basaglar 25U qhs at home  - Will resume home dose, admelog 10 TID, Lantus 25U qhs  - monitor blood glucose closely in the setting of CKD - resume home diltiazem 120mg qd and metoprolol succinate 100mg qd  - monitor blood pressures closely

## 2022-02-18 NOTE — ED PROVIDER NOTE - PROGRESS NOTE DETAILS
Brady: had a significant run of V Tach. no symptoms with this. will need admission on at least telemetry for cards eval

## 2022-02-18 NOTE — H&P ADULT - SKIN
b/l lower extremity venous stasis changes, blisters on RLE, healing wound on LLE plantar surface detailed exam

## 2022-02-18 NOTE — H&P ADULT - PROBLEM SELECTOR PLAN 3
Patient has a leukocytosis to 12.3, likely reactive vs in the setting of foot wounds  - f/u UA   - continue to trend  - wound care consult if needed Patient has a history of CAD s/p 3 stents  - continue aspirin 81mg qd, Pradaxa qd  - start statin

## 2022-02-18 NOTE — H&P ADULT - PROBLEM SELECTOR PLAN 2
Patient has a history of CAD s/p 3 stents  - continue aspirin 81mg qd, Pradaxa qd  - start statin Patient had a run of VT on tele however asymptomatic  - currently Afib on tele  - continue metoprolol 100mg qd  - monitor on tele  - goal K>4, Mag>2, Phos>3  - monitor electrolytes

## 2022-02-18 NOTE — H&P ADULT - NSHPLABSRESULTS_GEN_ALL_CORE
EKG personally reviewed. Afib nonspecific ST/Twave abnormalities. QTc 475  Imaging personally reviewed. CXR clear lungs.  Labs personally reviewed    x< from: Xray Chest 1 View- PORTABLE-Urgent (Xray Chest 1 View- PORTABLE-Urgent .) (02.18.22 @ 16:43) >      FINDINGS:    The lungs are grossly clear. No sizable pleural effusion. Elevated left   hemidiaphragm, unchanged.    Heart size cannot be assessed on this view.    Degenerative changes of the spine.    < end of copied text >

## 2022-02-18 NOTE — ED PROVIDER NOTE - CLINICAL SUMMARY MEDICAL DECISION MAKING FREE TEXT BOX
Brady: chest pain since yesterday, history of heart attacks and strokes. EKG shows afib. History of CHF. + shortness of breath. no fever. Very concerning for heart disease. Would ensure heart is ok and probably admit Brady: chest pain since yesterday, history of heart attacks and strokes. EKG shows afib. History of CHF. + shortness of breath. no fever. Very concerning for heart disease. runs of v tach. Would ensure heart is ok and probably admit

## 2022-02-18 NOTE — H&P ADULT - PROBLEM SELECTOR PLAN 4
Patient has a history of carotic stenosis  - MRA showed greater than 75% stenosis of the R distal common carotic a, 60% stenosis of the prximal L internal carotid a  - continue aspirin qd  - continue statin qd Patient has a leukocytosis to 12.3, likely reactive vs in the setting of foot wounds. Elevated lactate of 2.1.   - f/u UA   - continue to trend WBC  - trend lactate  - wound care consult if needed

## 2022-02-18 NOTE — H&P ADULT - PROBLEM SELECTOR PLAN 8
T2DM on Novolog 10 TID (+ additional units if BG is higher), Basaglar 25U qhs at home  - Will resume home dose, admelog 10 TID, Lantus 25U qhs  - - resume home diltiazem 120mg qd and metoprolol succinate 100mg qd  - monitor blood pressures closely - continue pradaxa BID  - continue metoprolol 100mg qd

## 2022-02-18 NOTE — H&P ADULT - PROBLEM SELECTOR PLAN 7
- continue pradaxa BID  - continue metoprolol 100mg qd Patient has CKD with Cr 2.05 on admission (previously 1.88 to 2.2 in 10/21)  - continue to monitor  - strict Is&Os

## 2022-02-18 NOTE — ED PROVIDER NOTE - CARE PLAN
Principal Discharge DX:	Elevated troponin level  Secondary Diagnosis:	NSVT (nonsustained ventricular tachycardia)   1

## 2022-02-18 NOTE — ED ADULT TRIAGE NOTE - CHIEF COMPLAINT QUOTE
Sharp non radiating Chest Pain beginning after dinner last night. "My heart was going all over the place" SOB, hx 3 stents

## 2022-02-18 NOTE — H&P ADULT - HISTORY OF PRESENT ILLNESS
67M with PMH of CAD, NSTEMI s/p 3 stents in 2014 (stents to LAD, proximal and distal LCx), ischemic cardiomyopathy, HTN, T2DM c/b peripheral neuropathy, CVA, TIA, Afib on Pradaxa, chronic RLE wound, L carotid stenosis s/p endarterectomy (2014) presents to the ED with acute onset chest pain for one day. Pain started right after    67M with PMH of CAD, NSTEMI s/p 3 stents in 2014 (stents to LAD, proximal and distal LCx), ischemic cardiomyopathy, HTN, T2DM c/b peripheral neuropathy, CVA, TIA, Afib on Pradaxa, chronic RLE wound, L carotid stenosis s/p endarterectomy (2014) presents to the ED with acute onset chest pain for one day. Pain started right after dinner and was continuous. Pain was sharp, burning, radiated to the L side of the chest. Patient also had associated shortness of breath. He was unable to lay flat, sat up in bed all night, Usually uses 2 pillows. Dyspnea worse on exertion. Patient denies any nausea, vomiting, abdominal pain, diarrhea or dysuria. No melena or hematochezia. Of note, patient missed a few doses of metoprolol.     In the ED, T is 97.4, , /73, RR 18 satting 97% on room air. Patient received aspirin 324mg X1.

## 2022-02-18 NOTE — CONSULT NOTE ADULT - ASSESSMENT
a/p  66 y/o male with a past medical history of HTN, DMT2 c/b LLE neuropathy, CVA/TIA, Afib (on Pradaxa , MI, CAD (NSTEMI) x 3 stents in 2014 to LAD, and proximal and distal LCx, ischemic cardiomyopathy, chronic unhealing RLE wound s/p angio, left CEA (2014), CKD presents with acute onset chest pain, pleuritic and inc dyspnea    #Chest pain  -in setting of increased AF rates off bb for 3 days   -resume toprol   -trend trop  -cont asa  -check echo   -consider ischemic eval pending clinical course  -sx atypical, r/o worsening pleural effusions  -hx of hydropneumothorax s/p drainage/CT    #R carotid stenosis  -cont med tx  -MRA noted with Greater than 75% stenosis of the right distal common carotid artery. Approximately 60% stenosis of the proximal left internal carotid artery.  -Continue ASA and Statin Therapy    #CAD  -S/p PCI to LAD and LCX in 2014  -BB, Asa, and Statin therapy  -trend trop     #Ischemic cardiomyopathy, chronic systolic/diastolic HF  -BB, ASA, Statin  -IV diuresis    #HTN  -cont home meds  -off ACE due to hyperkalemia hx    #DM2  -med f/u    #CKD  -f/u labs    #Afib  -resume BB  -cont pradaxa     70 minutes spent on total encounter; more than 50% of the visit was spent counseling and/or coordinating care by the attending physician.

## 2022-02-18 NOTE — H&P ADULT - PROBLEM SELECTOR PLAN 5
Patient has a history of CHF  - continue diuresis with bumex, metolazone  - strict Is&Os  - daily weights  - continue aspirin, beta blocker, statin Patient has a history of carotic stenosis  - MRA showed greater than 75% stenosis of the R distal common carotic a, 60% stenosis of the prximal L internal carotid a  - continue aspirin qd  - continue statin qd

## 2022-02-18 NOTE — ED ADULT NURSE NOTE - NSIMPLEMENTINTERV_GEN_ALL_ED
Implemented All Universal Safety Interventions:  [place] to call system. Call bell, personal items and telephone within reach. Instruct patient to call for assistance. Room bathroom lighting operational. Non-slip footwear when patient is off stretcher. Physically safe environment: no spills, clutter or unnecessary equipment. Stretcher in lowest position, wheels locked, appropriate side rails in place.

## 2022-02-18 NOTE — ED PROVIDER NOTE - ATTENDING CONTRIBUTION TO CARE
I performed a history and physical exam of the patient and discussed their management with the resident and /or advanced care provider. I reviewed the resident and /or ACP's note and agree with the documented findings and plan of care. My medical decision making and observations are found above.  lungs rales at base, abd soft,

## 2022-02-18 NOTE — ED PROVIDER NOTE - OBJECTIVE STATEMENT
68 yo M with a pmhx of CAD and NSTEMI s/p 3 stents in 2014 to LAD, proximal and distal LCx, ischemic cardiomyopathy, HTN, T2DM c/b peripheral neuropathy, CVA/TIA, atrial fibrillation on Pradaxa, chronic RLE wound, left carotid stenosis s/p endarterectomy 2014, gout presents to the ED with new onset chest pain that started 1 day ago. He describes the pain as burning, no worse with food, and radiate to his L chest. He admits to worsening dyspnea on exertion, unable to exertion himself. He denies any active chest pain at bedside. He admits to missing a few day doses of metoprolol. He denies any other symptoms at bedside.

## 2022-02-19 NOTE — PATIENT PROFILE ADULT - FALL HARM RISK - HARM RISK INTERVENTIONS
Communicate Risk of Fall with Harm to all staff/Monitor gait and stability/Reinforce activity limits and safety measures with patient and family/Tailored Fall Risk Interventions/Visual Cue: Yellow wristband and red socks/Bed in lowest position, wheels locked, appropriate side rails in place/Call bell, personal items and telephone in reach/Instruct patient to call for assistance before getting out of bed or chair/Non-slip footwear when patient is out of bed/[place] to call system/Physically safe environment - no spills, clutter or unnecessary equipment/Purposeful Proactive Rounding/Room/bathroom lighting operational, light cord in reach

## 2022-02-19 NOTE — PROGRESS NOTE ADULT - SUBJECTIVE AND OBJECTIVE BOX
CARDIOLOGY FOLLOW UP - Dr. Mazariegos  DATE OF SERVICE: 2/19/22    CC no cp or increase SOB       REVIEW OF SYSTEMS:  CONSTITUTIONAL: No fever, weight loss, or fatigue  RESPIRATORY: No cough, wheezing, chills or hemoptysis; No Shortness of Breath  CARDIOVASCULAR: No chest pain, palpitations, passing out, dizziness, or leg swelling  GASTROINTESTINAL: No abdominal or epigastric pain. No nausea, vomiting, or hematemesis; No diarrhea or constipation. No melena or hematochezia.      PHYSICAL EXAM:  T(C): 36.6 (02-19-22 @ 12:54), Max: 37 (02-19-22 @ 00:44)  HR: 100 (02-19-22 @ 12:54) (70 - 108)  BP: 155/78 (02-19-22 @ 12:54) (111/71 - 155/78)  RR: 18 (02-19-22 @ 12:54) (18 - 18)  SpO2: 97% (02-19-22 @ 12:54) (94% - 97%)  Wt(kg): --  I&O's Summary    18 Feb 2022 07:01  -  19 Feb 2022 07:00  --------------------------------------------------------  IN: 270 mL / OUT: 950 mL / NET: -680 mL    19 Feb 2022 07:01  -  19 Feb 2022 13:21  --------------------------------------------------------  IN: 240 mL / OUT: 250 mL / NET: -10 mL        Appearance: Normal	  Cardiovascular: Normal S1 S2, irreg   Respiratory:  diminished   Gastrointestinal:  Soft, Non-tender, + BS	  Extremities: bl le edema ++       Home Medications:  acetaminophen 325 mg oral tablet: 2 tab(s) orally every 6 hours, As needed, Temp greater or equal to 38C (100.4F), Mild Pain (1 - 3) (18 Feb 2022 21:56)  allopurinol 100 mg oral tablet: 1 tab(s) orally once a day (18 Feb 2022 21:56)  aspirin 81 mg oral delayed release tablet: 1 tab(s) orally once a day (18 Feb 2022 21:56)  bumetanide 2 mg oral tablet: 1 tab(s) orally once a day (18 Feb 2022 21:56)  insulin glargine 100 units/mL subcutaneous solution: 25 unit(s) subcutaneous once a day (18 Feb 2022 21:56)  metOLazone 5 mg oral tablet: 1 tab(s) orally 3 times a week (18 Feb 2022 21:56)  metoprolol succinate 50 mg oral tablet, extended release: 2 tab(s) orally once a day (18 Feb 2022 21:56)  NovoLOG FlexPen 100 units/mL injectable solution: 10 unit(s) subcutaneous 3 times a day (18 Feb 2022 21:56)  oxycodone-acetaminophen 5 mg-325 mg oral tablet: 1 tab(s) orally 2 times a day (18 Feb 2022 21:56)  Pradaxa 150 mg oral capsule: 1 cap(s) orally 2 times a day (18 Feb 2022 21:56)  pregabalin 100 mg oral capsule: 1 cap(s) orally 3 times a day (18 Feb 2022 21:56)      MEDICATIONS  (STANDING):  allopurinol 100 milliGRAM(s) Oral daily  aspirin enteric coated 81 milliGRAM(s) Oral daily  atorvastatin 40 milliGRAM(s) Oral at bedtime  buMETAnide Injectable 2 milliGRAM(s) IV Push daily  dextrose 40% Gel 15 Gram(s) Oral once  dextrose 5%. 1000 milliLiter(s) (50 mL/Hr) IV Continuous <Continuous>  dextrose 5%. 1000 milliLiter(s) (100 mL/Hr) IV Continuous <Continuous>  dextrose 50% Injectable 25 Gram(s) IV Push once  dextrose 50% Injectable 12.5 Gram(s) IV Push once  dextrose 50% Injectable 25 Gram(s) IV Push once  diltiazem    milliGRAM(s) Oral at bedtime  glucagon  Injectable 1 milliGRAM(s) IntraMuscular once  heparin  Infusion.  Unit(s)/Hr (24 mL/Hr) IV Continuous <Continuous>  insulin glargine Injectable (LANTUS) 25 Unit(s) SubCutaneous at bedtime  insulin lispro (ADMELOG) corrective regimen sliding scale   SubCutaneous three times a day before meals  insulin lispro (ADMELOG) corrective regimen sliding scale   SubCutaneous at bedtime  insulin lispro Injectable (ADMELOG) 10 Unit(s) SubCutaneous three times a day before meals  metolazone 5 milliGRAM(s) Oral <User Schedule>  metoprolol succinate  milliGRAM(s) Oral daily  potassium chloride    Tablet ER 20 milliEquivalent(s) Oral every 2 hours  potassium phosphate / sodium phosphate Powder (PHOS-NaK) 1 Packet(s) Oral four times a day with meals  pregabalin 100 milliGRAM(s) Oral three times a day      TELEMETRY: afib hr 70-100s 	    ECG:  	  RADIOLOGY: < from: Xray Chest 1 View- PORTABLE-Urgent (Xray Chest 1 View- PORTABLE-Urgent .) (02.18.22 @ 16:43) >      IMPRESSION:    Grossly clear lungs.    --- End of Report ---        < end of copied text >    DIAGNOSTIC TESTING:  [ ] Echocardiogram:  [ ]  Catheterization:  [ ] Stress Test:    OTHER: 	    LABS:	 	    Creatine Kinase, Serum: 226 U/L [30 - 200] (02-19 @ 07:17)  CKMB Units: 23.2 ng/mL [0.0 - 6.7] (02-19 @ 07:17)  Troponin T, High Sensitivity Result: 1127 ng/L [0 - 51] (02-19 @ 07:17)  Creatine Kinase, Serum: 270 U/L [30 - 200] (02-19 @ 00:12)  CKMB Units: 30.3 ng/mL [0.0 - 6.7] (02-19 @ 00:12)  Troponin T, High Sensitivity Result: 971 ng/L [0 - 51] (02-19 @ 00:12)  Troponin T, High Sensitivity Result: 932 ng/L [0 - 51] (02-18 @ 17:23)                          14.5   10.53 )-----------( 211      ( 19 Feb 2022 07:32 )             47.9     02-19    138  |  94<L>  |  61<H>  ----------------------------<  198<H>  3.5   |  29  |  2.11<H>    Ca    10.3      19 Feb 2022 07:17  Phos  4.1     02-19  Mg     1.9     02-19    TPro  7.9  /  Alb  3.8  /  TBili  0.7  /  DBili  x   /  AST  44<H>  /  ALT  7<L>  /  AlkPhos  104  02-19    PT/INR - ( 18 Feb 2022 17:23 )   PT: 17.3 sec;   INR: 1.47 ratio         PTT - ( 18 Feb 2022 17:23 )  PTT:59.1 sec

## 2022-02-19 NOTE — CHART NOTE - NSCHARTNOTEFT_GEN_A_CORE
S: abnormal troponins  HPI : 67M with PMH of CAD, NSTEMI s/p 3 stents in 2014 (stents to LAD, proximal and distal LCx), ischemic cardiomyopathy, HTN, T2DM c/b peripheral neuropathy, CVA, TIA, Afib on Pradaxa, chronic RLE wound, L carotid stenosis s/p endarterectomy (2014) presents to the ED with acute onset chest pain for one day. Pain started right after dinner and was continuous. Pain was sharp, burning, radiated to the L side of the chest. Patient also had associated shortness of breath. He was unable to lay flat, sat up in bed all night, Usually uses 2 pillows. Dyspnea worse on exertion. Patient denies any nausea, vomiting, abdominal pain, diarrhea or dysuria. No melena or hematochezia. Of note, patient missed a few doses of metoprolol.     In the ED, T is 97.4, , /73, RR 18 satting 97% on room air. Patient received aspirin 324mg X1.     Troponin T, High Sensitivity (02.19.22 @ 07:17)   Troponin T, High Sensitivity Result: 1127: Specimen not hemolyzed     Historical Values  CKMB Mass Assay (02.19.22 @ 07:17)   CPK Mass Assay %: 10.3 % ``Historical Values  Creatine Kinase, Serum: 226 U/L (02.19.22 @ 07:17)   Creatine Kinase, Serum: 270 U/L (02.19.22 @ 00:12)   Creatine Kinase, Serum: 80 U/L (10.16.19 @ 14:38)   Creatine Kinase, Serum: 174 U/L (02.03.17 @ 22:23)   Creatine Kinase, Serum: 187 U/L (02.03.17 @ 08:51)   Creatine Kinase, Serum: 697 U/L (01.19.16 @ 06:21)   Creatine Kinase, Serum: 702 U/L (01.18.16 @ 12:55)   Creatine Kinase, Serum: 65 U/L (02.07.14 @ 19:39)   Creatine Kinase, Serum: 70 U/L (02.07.14 @ 07:42)   Creatine Kinase, Serum: 64 U/L (02.06.14 @ 20:18)   Creatine Kinase, Serum: 79 U/L (02.04.14 @ 23:44)   Creatine Kinase, Serum: 93 U/L (02.04.14 @ 14:45)   CKMB Mass Assay (02.19.22 @ 00:12)   CPK Mass Assay %: 11.2 %   CKMB Mass Assay (02.03.17 @ 22:23)   CPK Mass Assay %: 2.9: "CKMB% results are not reliable when total CPK is < 100 U/L". %   CKMB Mass Assay (01.18.16 @ 12:55)   CPK Mass Assay %: 0.7: "CKMB% results are not reliable when total CPK is < 100 U/L". %   CKMB Mass Assay (02.07.14 @ 07:42)   CPK Mass Assay %: 5.0: "CKMB% results are not reliable when total CPK is < 100 U/L". %   asic Metabolic Panel in AM (10.27.21 @ 07:08)   Sodium, Serum: 134 mmol/L   Potassium, Serum: 4.0 mmol/L   Chloride, Serum: 92 mmol/L   Carbon Dioxide, Serum: 24 mmol/L   Anion Gap, Serum: 18 mmol/L   Blood Urea Nitrogen, Serum: 70 mg/dL   Creatinine, Serum: 2.22 mg/dL   Glucose, Serum: 307 mg/dL   Calcium, Total Serum: 9.6 mg/dL     67 yrs old male presented with worsening chest pain and sob and found with abnormal cardiac enzymes  Writer spoke with cardiology Dr Mazariegos and recommended  #1 d/c pradaxa and start hep gtt tonight in preparation for catherization  Stat dose of bumex 2 mgs ivp , strict 1/0 might need a bumex gtt if no improvement  keep K+ >4; trend cardiac enzymes  plavix 600mgs x1 stat  #2 ANT : as per Dr Corral nephrology dorita S: abnormal troponins  HPI : 67M with PMH of CAD, NSTEMI s/p 3 stents in 2014 (stents to LAD, proximal and distal LCx), ischemic cardiomyopathy, HTN, T2DM c/b peripheral neuropathy, CVA, TIA, Afib on Pradaxa, chronic RLE wound, L carotid stenosis s/p endarterectomy (2014) presents to the ED with acute onset chest pain for one day. Pain started right after dinner and was continuous. Pain was sharp, burning, radiated to the L side of the chest. Patient also had associated shortness of breath. He was unable to lay flat, sat up in bed all night, Usually uses 2 pillows. Dyspnea worse on exertion. Patient denies any nausea, vomiting, abdominal pain, diarrhea or dysuria. No melena or hematochezia. Of note, patient missed a few doses of metoprolol.     In the ED, T is 97.4, , /73, RR 18 satting 97% on room air. Patient received aspirin 324mg X1.     Troponin T, High Sensitivity (02.19.22 @ 07:17)   Troponin T, High Sensitivity Result: 1127: Specimen not hemolyzed     Historical Values  CKMB Mass Assay (02.19.22 @ 07:17)   CPK Mass Assay %: 10.3 % ``Historical Values  Creatine Kinase, Serum: 226 U/L (02.19.22 @ 07:17)   Creatine Kinase, Serum: 270 U/L (02.19.22 @ 00:12)   Creatine Kinase, Serum: 80 U/L (10.16.19 @ 14:38)   Creatine Kinase, Serum: 174 U/L (02.03.17 @ 22:23)   Creatine Kinase, Serum: 187 U/L (02.03.17 @ 08:51)   Creatine Kinase, Serum: 697 U/L (01.19.16 @ 06:21)   Creatine Kinase, Serum: 702 U/L (01.18.16 @ 12:55)   Creatine Kinase, Serum: 65 U/L (02.07.14 @ 19:39)   Creatine Kinase, Serum: 70 U/L (02.07.14 @ 07:42)   Creatine Kinase, Serum: 64 U/L (02.06.14 @ 20:18)   Creatine Kinase, Serum: 79 U/L (02.04.14 @ 23:44)   Creatine Kinase, Serum: 93 U/L (02.04.14 @ 14:45)   CKMB Mass Assay (02.19.22 @ 00:12)   CPK Mass Assay %: 11.2 %   CKMB Mass Assay (02.03.17 @ 22:23)   CPK Mass Assay %: 2.9: "CKMB% results are not reliable when total CPK is < 100 U/L". %   CKMB Mass Assay (01.18.16 @ 12:55)   CPK Mass Assay %: 0.7: "CKMB% results are not reliable when total CPK is < 100 U/L". %   CKMB Mass Assay (02.07.14 @ 07:42)   CPK Mass Assay %: 5.0: "CKMB% results are not reliable when total CPK is < 100 U/L". %   asic Metabolic Panel in AM (10.27.21 @ 07:08)   Sodium, Serum: 134 mmol/L   Potassium, Serum: 4.0 mmol/L   Chloride, Serum: 92 mmol/L   Carbon Dioxide, Serum: 24 mmol/L   Anion Gap, Serum: 18 mmol/L   Blood Urea Nitrogen, Serum: 70 mg/dL   Creatinine, Serum: 2.22 mg/dL   Glucose, Serum: 307 mg/dL   Calcium, Total Serum: 9.6 mg/dL     67 yrs old male presented with worsening chest pain and sob and found with abnormal cardiac enzymes  Writer spoke with cardiology Dr Mazariegos and recommended  #1 d/c pradaxa and start hep gtt tonight in preparation for catherization  Stat dose of bumex 2 mgs ivp , strict i/0 might need a bumex gtt if no improvement  keep K+ >4; trend cardiac enzymes  plavix 600mgs x1 stat  #2 ANT : as per Dr Corral nephrology dorita

## 2022-02-19 NOTE — PATIENT PROFILE ADULT - NSPROHMDIABETMGMTSTRAT_GEN_A_NUR
blood glucose testing/coping strategies/diet modification/exercise/insulin therapy/medication therapy/routine screenings/weight management

## 2022-02-19 NOTE — PROGRESS NOTE ADULT - ASSESSMENT
67M with PMH of CAD, NSTEMI s/p 3 stents in 2014 (stents to LAD, proximal and distal LCx), ischemic cardiomyopathy, HTN, T2DM c/b peripheral neuropathy, CVA, TIA, Afib on Pradaxa, chronic RLE wound, L carotid stenosis s/p endarterectomy (2014) presents to the ED with acute onset chest pain and sob     Problem/Plan - 1:  ·  Problem: Chest pain.   ·  Plan: Patient presents with atypical chest pain, possibly 2/2 Afib. Chest pain now resolved. Troponin elevated   - cardiology recs appreciated  c/w meds  trops    - f/u TTE to assess cardiac function  - ischemic eval per cardiology./cath>?     Problem/Plan - 2:  ·  Problem: Cardiac arrhythmia.  ·  Plan: Patient had a run of VT on tele however asymptomatic  - currently Afib on tele  -c/w meds     Problem/Plan - 3:  ·  Problem: CAD (coronary artery disease).  ·  Plan: Patient has a history of CAD s/p 3 stents  c/w meds  chf  diuresis as per cards    ·  Problem: Carotid stenosis.  ·  Plan: Patient has a history of carotic stenosis  - MRA showed greater than 75% stenosis of the R distal common carotic a, 60% stenosis of the prximal L internal carotid a  - continue aspirin qd  - continue statin qd.     Problem/Plan - 6:  ·  Problem: Cardiomyopathy, ischemic.  ·  Plan: Patient has a history of CHF  - continue diuresis with bumex, metolazone  - strict Is&Os  - daily weights  - continue aspirin, beta blocker, statin.      ·  Problem: Chronic kidney disease, unspecified CKD stage  renal eval  - continue to monitor  - strict Is&Os.    ·  Problem: Afib.  ·  Plan: - continue pradaxa BID  - continue metoprolol 100mg qd.      ·  Problem: Hypertension.c/w meds  - monitor blood pressures closely.      ·  Problem: T2DM (type 2 diabetes mellitus).  ·  Plan; T2DM on Novolog 10 TID (+ additional units if BG is higher), Basaglar 25U qhs at home   admelog 10 TID, Lantus 25U qhs  - monitor blood glucose closely in the setting of CKD.    ·  Problem: Preventive measure.   ·  Plan: - DVT ppx: Pradaxa  - Diet: DASH/CC  - Dispo: pending clinical improvement.

## 2022-02-19 NOTE — CONSULT NOTE ADULT - PROBLEM SELECTOR RECOMMENDATION 9
suspect cardiac in origin given elevated trops and LE edema  -CXR grossly clear  -Keep O>I as tolerated  -Pt with hx of hydroPTX, CT chest non contrast ordered   -LE duplex ordered  -Normoxic, keep sats >92% with supplemental O2 PRN suspect cardiac in origin given elevated trops and LE edema  -Endorses compliance with Pradaxa as an outpt  -CXR grossly clear  -Keep O>I as tolerated  -Pt with hx of hydroPTX, CT chest non contrast ordered   -LE duplex ordered  -Normoxic, keep sats >92% with supplemental O2 PRN suspect cardiac in origin given elevated trops and LE edema  -Endorses compliance with Pradaxa as an outpt  -CXR raides lt hemidiaphragm, rll streaky atelectasis  -Keep O>I as tolerated  -Pt with hx of hydroPTX, CT chest non contrast ordered   -LE duplex ordered  -Normoxic, keep sats >92% with supplemental O2 PRN

## 2022-02-19 NOTE — PROGRESS NOTE ADULT - ASSESSMENT
Echo 10/17/19: EF 45%, Grossly borderline left ventricular systolic dysfunction.   ** Compared with echocardiogram of 12/29/2018, no significant changes noted (reported LVEF in last study is slightly lower, but atrial fibrillation makes it difficult to assess ejection fraction).    a/p  68 y/o male with a past medical history of HTN, DMT2 c/b LLE neuropathy, CVA/TIA, Afib (on Pradaxa , MI, CAD (NSTEMI) x 3 stents in 2014 to LAD, and proximal and distal LCx, ischemic cardiomyopathy, chronic unhealing RLE wound s/p angio, left CEA (2014), CKD presents with acute onset chest pain, pleuritic and inc dyspnea    #Chest pain   -likely in setting of increased AF rates off bb for 3 days vs new ischemic heart disease  vs worsening pleural effusions  -sx atypical  -hx of hydropneumothorax s/p drainage/CT  -chest xray unremarkable-- pending CT chest   -c/w toprol   -trops ck.ckmb up-trending   -no ischemic changes to ecg   -cont asa  -hold oral ac- start Hep gtt -- continue to trend enzymes/ ck ckmb  -sp plavix load   -Pending echo   -likely plan for cath next week       #R carotid stenosis  -cont med tx  -MRA noted with Greater than 75% stenosis of the right distal common carotid artery. Approximately 60% stenosis of the proximal left internal carotid artery.  -Continue ASA and Statin Therapy    #CAD  -S/p PCI to LAD and LCX in 2014  -BB, Asa, and Statin therapy  -trend trop     #Ischemic cardiomyopathy, chronic systolic/diastolic HF  -BB, ASA, Statin  -IV bumex/ po metoalzone as ordered- trend i/o   -pending le doppler r/o dvt     #HTN  -cont home meds  -off ACE due to hyperkalemia hx    #alex on ckd  -renal eval- c/w diuretics for now     #Afib  -rates stable- c/w CCB and BB  - pradaxa on hold-- start Hep gtt

## 2022-02-19 NOTE — CONSULT NOTE ADULT - ASSESSMENT
68 y/o M with PMH of pleural effusion 2019 - s/p R thoracentesis then R chest tube placement with course c/b R hydroPTX - tx to LI for possible VATS decortication which was deferred, CAD, NSTEMI s/p 3 stents in 2014 (stents to LAD, proximal and distal LCx), ischemic cardiomyopathy, HTN, T2DM c/b peripheral neuropathy, CVA, TIA, Afib on Pradaxa, chronic RLE wound, L carotid stenosis s/p endarterectomy (2014). Presents to ED with acute onset chest pain, SOB for one day. Labs notable for elevated troponin. CXR grossly clear.

## 2022-02-19 NOTE — CONSULT NOTE ADULT - PROBLEM SELECTOR RECOMMENDATION 2
-Elevated troponin, continue to trend  -Plavix, asa, heparin gtt per cards   -?Plans for cath -Elevated troponin, continue to trend  -Plavix, asa, heparin gtt per cards   -F/u TTE  -?Plans for cath

## 2022-02-19 NOTE — CONSULT NOTE ADULT - SUBJECTIVE AND OBJECTIVE BOX
Patient is a 67y old  Male who presents with a chief complaint of chest pain (2022 15:04)      HPI:  Mr. Stevens is a 67 year old gentleman with PMH of CAD, NSTEMI s/p 3 stents in  (stents to LAD, proximal and distal LCx), ischemic cardiomyopathy, HTN for 10 years, T2DM for 26 years c/b peripheral neuropathy, CVA, TIA, Afib on Pradaxa, chronic RLE wound, L carotid stenosis s/p endarterectomy () presents to the ED with acute onset chest pain for one day. Pain started right after dinner and was continuous. Pain was sharp, burning, radiated to the L side of the chest. Patient also had associated shortness of breath. He was unable to lay flat, sat up in bed all night, Usually uses 2 pillows. Dyspnea worse on exertion. Patient denies any nausea, vomiting, abdominal pain, diarrhea or dysuria. No melena or hematochezia. Of note, patient missed a few doses of metoprolol. He follows with a nephrologist and stated his baseline Serum creatinine is in the 2.0 to 2.3 mg/dl range. He already drank 500 ml of fluid in addition to another 500 ml earlier and 1 liter prior to coming in. He drank a total of 2 liters today. Nephrology consulted for CKD stage 3 in addition to manage his diuretics.     In the ED, T is 97.4, , /73, RR 18 satting 97% on room air. Patient received aspirin 324mg X1.  (2022 21:00)      PAST MEDICAL & SURGICAL HISTORY:  HTN (hypertension), benign  HLD (hyperlipidemia)  DM type 2 (diabetes mellitus, type 2)  TIA (transient ischemic attack)  Atrial fibrillation  MI (myocardial infarction)  circa   CAD (coronary artery disease)  Neuropathy  Status post angioplasty with stent LULU x 3 2014  S/P carotid endarterectomy left        MEDICATIONS  (STANDING):  allopurinol 100 milliGRAM(s) Oral daily  aspirin enteric coated 81 milliGRAM(s) Oral daily  atorvastatin 40 milliGRAM(s) Oral at bedtime  buMETAnide Injectable 2 milliGRAM(s) IV Push daily  dextrose 40% Gel 15 Gram(s) Oral once  dextrose 5%. 1000 milliLiter(s) (50 mL/Hr) IV Continuous <Continuous>  dextrose 5%. 1000 milliLiter(s) (100 mL/Hr) IV Continuous <Continuous>  dextrose 50% Injectable 25 Gram(s) IV Push once  dextrose 50% Injectable 12.5 Gram(s) IV Push once  dextrose 50% Injectable 25 Gram(s) IV Push once  diltiazem    milliGRAM(s) Oral at bedtime  glucagon  Injectable 1 milliGRAM(s) IntraMuscular once  heparin  Infusion.  Unit(s)/Hr (24 mL/Hr) IV Continuous <Continuous>  insulin glargine Injectable (LANTUS) 25 Unit(s) SubCutaneous at bedtime  insulin lispro (ADMELOG) corrective regimen sliding scale   SubCutaneous three times a day before meals  insulin lispro (ADMELOG) corrective regimen sliding scale   SubCutaneous at bedtime  insulin lispro Injectable (ADMELOG) 10 Unit(s) SubCutaneous three times a day before meals  metolazone 5 milliGRAM(s) Oral <User Schedule>  metoprolol succinate  milliGRAM(s) Oral daily  potassium phosphate / sodium phosphate Powder (PHOS-NaK) 1 Packet(s) Oral four times a day with meals  pregabalin 100 milliGRAM(s) Oral three times a day      Allergies  No Known Allergies      SOCIAL HISTORY:  Denies alcohol or tobacco abuse.    FAMILY HISTORY:  Family history of heart disease  father    Family history of CVA  mother      No kidney disease in family.    REVIEW OF SYSTEMS:  CONSTITUTIONAL: No weakness, fevers or chills  EYES/ENT: No visual changes  NECK: No pain or stiffness  RESPIRATORY: No cough, wheezing, hemoptysis. Has shortness of breath  CARDIOVASCULAR: Pain was sharp, burning, radiated to the L side of the chest  GASTROINTESTINAL: No abdominal or epigastric pain. No nausea, vomiting, or hematemesis. No diarrhea or constipation. No melena or hematochezia  GENITOURINARY: No dysuria, frequency or hematuria. No stones or infection  NEUROLOGICAL: No numbness or weakness  SKIN: No itching, burning, rashes, or lesions   All other review of systems is negative unless indicated above    VITAL:  T(C): , Max: 37 (22 @ 00:44)  T(F): , Max: 98.6 (22 @ 00:44)  HR: 77 (22 @ 16:54)  BP: 106/60 (22 @ 16:54)  RR: 17 (22 @ 16:54)  SpO2: 96% (22 @ 16:54)      PHYSICAL EXAM:  General: NAD, Alert  HEENT: NCAT, PERRLA  Neck: Supple, No JVD  Respiratory: Bibasilar crackles  Cardiovascular: RRR s1s2, no m/r/g  Gastrointestinal: +BS, soft, NT/ND  Extremities: Weeping edema with blisters and 3 +.  Neurological: no focal deficits; strength grossly intact  Psychiatric: Normal mood, normal affect  Back: no CVAT b/l  Skin: No rashes, no nevi      LABS:                        14.5   10.53 )-----------( 211      ( 2022 07:32 )             47.9     Na(138)/K(3.5)/Cl(94)/HCO3(29)/BUN(61)/Cr(2.11)Glu(198)/Ca(10.3)/Mg(1.9)/PO4(4.1)     @ 07:17  Na(137)/K(3.8)/Cl(93)/HCO3(29)/BUN(61)/Cr(2.05)Glu(229)/Ca(10.7)/Mg(1.9)/PO4(3.8)     @ 17:23      Urinalysis Basic - ( 2022 01:19 )    Color: Light Yellow / Appearance: Clear / S.013 / pH: x  Gluc: x / Ketone: Negative  / Bili: Negative / Urobili: Negative   Blood: x / Protein: 100 mg/dL / Nitrite: Negative   Leuk Esterase: Negative / RBC: 7 /hpf / WBC 1 /HPF   Sq Epi: x / Non Sq Epi: 1 /hpf / Bacteria: Negative    IMAGING:  ACC: [id number] EXAM:  CT CHEST                          PROCEDURE DATE:  2022          INTERPRETATION:  .  ACC: [id number]  INDICATION: Shortness of breath, history of hydropneumothorax  TECHNIQUE: Unenhanced CT of the chest. Coronal, sagittal, and MIP images   were reconstructed and reviewed.  COMPARISON: 2019 chest CT.    FINDINGS:    AIRWAYS, LUNGS and PLEURA: Patent central airways. Severe bibasilar   pleural thickening and rounded atelectasis within basilar lower lobes.   Atelectasis of inferior segment of lingula. Scattered small calcified   granulomas. Trace left pleural effusion. No pneumothorax.    MEDIASTINUM AND STEPHON: Numerous subcentimeter mediastinal lymph nodes are   unchanged.    HEART AND VESSELS: Cardiomegaly. Coronary and aortic calcifications. No   pericardial effusion. Thoracic aorta normal in diameter. Dilated   pulmonary arteries suggestive of pulmonary hypertension.    VISUALIZED UPPER ABDOMEN: Bilateral perinephric fat stranding is   unchanged. Aortic calcification.    CHEST WALL AND BONES: No aggressive osseous lesion.    LOWER NECK: Within normal limits.    IMPRESSION:    Severe bibasilar pleural thickening and rounded atelectasis within   basilar lower lobes. Atelectasis of inferior segment of lingula. Trace   left pleural effusion. No pneumothorax.    --- End of Report ---            CELY BALL MD; Attending Radiologist    ASSESSMENT:  Mr. Stevens is an 67 year old gentleman with PMH of CAD, NSTEMI s/p 3 stents in  (stents to LAD, proximal and distal LCx), ischemic cardiomyopathy, HTN, T2DM c/b peripheral neuropathy, CVA, TIA, Afib on Pradaxa, chronic RLE wound, L carotid stenosis s/p endarterectomy () presents to the ED with acute onset chest pain and sob. Nephrology consulted for CKD stage 3 and management of his diuretics.    1. CKD stage 3 with serum creatinine of 2.11 mg/dl and is stable.   2. Volume overloaded. He drank 2 liters thus far.   3. Hypokalemia.  4. Elevated bicarb in the setting of possible CO2 retention.   5. Cardiomyopathy defer to primary  6. HFpEF    RECOMMEND:  Bumex 2mg IV daily and cont with metolazone 5 mg po daily  check orthostatics  UA, urine sodium, urine creatinine, urine protein, and urine chloride with urine urea.    allopurinol 100 mg daily   cardizem cd 120 mg daily   Strict intake and output  BMP, CBC, and phosphorus in am      Thank you for involving Nephrology in this patient's care.    With warm regards,    Katie Ferrer DO

## 2022-02-19 NOTE — CONSULT NOTE ADULT - SUBJECTIVE AND OBJECTIVE BOX
PULMONARY CONSULT    HPI: 68 y/o M with PMH of pleural effusion 2019 - s/p R thoracentesis then R chest tube placement with course c/b R hydroPTX s/p R VATS decortication 2019, CAD, NSTEMI s/p 3 stents in  (stents to LAD, proximal and distal LCx), ischemic cardiomyopathy, HTN, T2DM c/b peripheral neuropathy, CVA, TIA, Afib on Pradaxa, chronic RLE wound, L carotid stenosis s/p endarterectomy (). Presents to ED with acute onset chest pain for one day. Patient also had associated shortness of breath. He was unable to lay flat, sat up in bed all night, Usually uses 2 pillows. Dyspnea worse on exertion. Reported to miss few days of metoprolol.           PAST MEDICAL & SURGICAL HISTORY:  HTN (hypertension), benign  HLD (hyperlipidemia)  DM type 2 (diabetes mellitus, type 2)  TIA (transient ischemic attack)  Atrial fibrillation  MI (myocardial infarction)  circa   CAD (coronary artery disease)  Neuropathy  Status post angioplasty with stent  LULU x 3 2014  S/P carotid endarterectomy  left      Allergies  No Known Allergies      FAMILY HISTORY:  Family history of heart disease  father    Family history of CVA  mother      Social history:     Review of Systems:  CONSTITUTIONAL: No fever, chills, or fatigue  EYES: No eye pain, visual disturbances, or discharge  ENMT:  No difficulty hearing, tinnitus, vertigo; No sinus or throat pain  NECK: No pain or stiffness  RESPIRATORY: Per above  CARDIOVASCULAR: No chest pain, palpitations, dizziness, or leg swelling  GASTROINTESTINAL: No abdominal or epigastric pain. No nausea, vomiting, or hematemesis; No diarrhea or constipation. No melena or hematochezia.  GENITOURINARY: No dysuria, frequency, hematuria, or incontinence  NEUROLOGICAL: No headaches, memory loss, loss of strength, numbness, or tremors  SKIN: No itching, burning, rashes, or lesions   MUSCULOSKELETAL: No joint pain or swelling; No muscle, back, or extremity pain  PSYCHIATRIC: No depression, anxiety, mood swings, or difficulty sleeping      Medications:  MEDICATIONS  (STANDING):  allopurinol 100 milliGRAM(s) Oral daily  aspirin enteric coated 81 milliGRAM(s) Oral daily  atorvastatin 40 milliGRAM(s) Oral at bedtime  buMETAnide Injectable 2 milliGRAM(s) IV Push daily  buMETAnide Injectable 2 milliGRAM(s) IV Push once  clopidogrel Tablet 600 milliGRAM(s) Oral once  dextrose 40% Gel 15 Gram(s) Oral once  dextrose 5%. 1000 milliLiter(s) (50 mL/Hr) IV Continuous <Continuous>  dextrose 5%. 1000 milliLiter(s) (100 mL/Hr) IV Continuous <Continuous>  dextrose 50% Injectable 25 Gram(s) IV Push once  dextrose 50% Injectable 12.5 Gram(s) IV Push once  dextrose 50% Injectable 25 Gram(s) IV Push once  diltiazem    milliGRAM(s) Oral at bedtime  glucagon  Injectable 1 milliGRAM(s) IntraMuscular once  heparin  Infusion.  Unit(s)/Hr (24 mL/Hr) IV Continuous <Continuous>  insulin glargine Injectable (LANTUS) 25 Unit(s) SubCutaneous at bedtime  insulin lispro (ADMELOG) corrective regimen sliding scale   SubCutaneous three times a day before meals  insulin lispro (ADMELOG) corrective regimen sliding scale   SubCutaneous at bedtime  insulin lispro Injectable (ADMELOG) 10 Unit(s) SubCutaneous three times a day before meals  metolazone 5 milliGRAM(s) Oral <User Schedule>  metoprolol succinate  milliGRAM(s) Oral daily  potassium phosphate / sodium phosphate Powder (PHOS-NaK) 1 Packet(s) Oral four times a day with meals  pregabalin 100 milliGRAM(s) Oral three times a day    MEDICATIONS  (PRN):  heparin   Injectable 36603 Unit(s) IV Push every 6 hours PRN For aPTT less than 40  heparin   Injectable 5000 Unit(s) IV Push every 6 hours PRN For aPTT between 40 - 57  oxycodone    5 mG/acetaminophen 325 mG 1 Tablet(s) Oral every 12 hours PRN Severe Pain (7 - 10)            Vital Signs Last 24 Hrs  T(C): 36.7 (2022 10:10), Max: 37 (2022 00:44)  T(F): 98.1 (2022 10:10), Max: 98.6 (2022 00:44)  HR: 70 (2022 11:39) (70 - 108)  BP: 111/71 (2022 11:39) (111/71 - 136/74)  BP(mean): --  RR: 18 (2022 11:39) (18 - 18)  SpO2: 96% (2022 11:39) (94% - 97%)      VBG pH 7.40  @ 06:27  VBG pCO2 58 - @ 06:27  VBG O2 sat 56.9  @ 06:27  VBG lactate 1.6  @ 06:27  VBG pH 7.40  @ 17:19  VBG pCO2 56  @ 17:19  VBG O2 sat 40.4  @ 17:19  VBG lactate 2.1  @ 17:19         @ 07:01  -   @ 07:00  --------------------------------------------------------  IN: 270 mL / OUT: 950 mL / NET: -680 mL          LABS:                        14.5   10.53 )-----------( 211      ( 2022 07:32 )             47.9     02-19    138  |  94<L>  |  61<H>  ----------------------------<  198<H>  3.5   |  29  |  2.11<H>    Ca    10.3      2022 07:17  Phos  4.1       Mg     1.9         TPro  7.9  /  Alb  3.8  /  TBili  0.7  /  DBili  x   /  AST  44<H>  /  ALT  7<L>  /  AlkPhos  104  02-19      CARDIAC MARKERS ( 2022 07:17 )  x     / x     / 226 U/L / x     / 23.2 ng/mL  CARDIAC MARKERS ( 2022 00:12 )  x     / x     / 270 U/L / x     / 30.3 ng/mL      CAPILLARY BLOOD GLUCOSE      POCT Blood Glucose.: 171 mg/dL (2022 08:51)    PT/INR - ( 2022 17:23 )   PT: 17.3 sec;   INR: 1.47 ratio         PTT - ( 2022 17:23 )  PTT:59.1 sec  Urinalysis Basic - ( 2022 01:19 )    Color: Light Yellow / Appearance: Clear / S.013 / pH: x  Gluc: x / Ketone: Negative  / Bili: Negative / Urobili: Negative   Blood: x / Protein: 100 mg/dL / Nitrite: Negative   Leuk Esterase: Negative / RBC: 7 /hpf / WBC 1 /HPF   Sq Epi: x / Non Sq Epi: 1 /hpf / Bacteria: Negative        Serum Pro-Brain Natriuretic Peptide: 10381 pg/mL (22 @ 17:23)              Physical Examination:    General: No acute distress.      HEENT: Pupils equal, reactive to light.  Symmetric.    PULM: Clear to auscultation bilaterally, no significant sputum production    CVS: S1, S2    ABD: Soft, nondistended, nontender, normoactive bowel sounds, no masses    EXT: No edema, nontender    SKIN: Warm and well perfused, no rashes noted.    NEURO: Alert, oriented, interactive, nonfocal        RADIOLOGY REVIEWED  CXR: grossly clear    PULMONARY CONSULT    HPI: 68 y/o M with PMH of pleural effusion 2019 - s/p R thoracentesis then R chest tube placement with course c/b R hydroPTX - tx to [facility] for possible VATS decortication which was deferred, CAD, NSTEMI s/p 3 stents in  (stents to LAD, proximal and distal LCx), ischemic cardiomyopathy, HTN, T2DM c/b peripheral neuropathy, CVA, TIA, Afib on Pradaxa, chronic RLE wound, L carotid stenosis s/p endarterectomy (). Presents to ED with acute onset chest pain for one day. Patient also had associated shortness of breath. He was unable to lay flat, sat up in bed all night, Usually uses 2 pillows. Dyspnea worse on exertion. Reported to miss few days of metoprolol.           PAST MEDICAL & SURGICAL HISTORY:  HTN (hypertension), benign  HLD (hyperlipidemia)  DM type 2 (diabetes mellitus, type 2)  TIA (transient ischemic attack)  Atrial fibrillation  MI (myocardial infarction)  circa   CAD (coronary artery disease)  Neuropathy  Status post angioplasty with stent  LULU x 3 2014  S/P carotid endarterectomy  left      Allergies  No Known Allergies      FAMILY HISTORY:  Family history of heart disease  father    Family history of CVA  mother      Social history:     Review of Systems:  CONSTITUTIONAL: No fever, chills, or fatigue  EYES: No eye pain, visual disturbances, or discharge  ENMT:  No difficulty hearing, tinnitus, vertigo; No sinus or throat pain  NECK: No pain or stiffness  RESPIRATORY: Per above  CARDIOVASCULAR: No chest pain, palpitations, dizziness, or leg swelling  GASTROINTESTINAL: No abdominal or epigastric pain. No nausea, vomiting, or hematemesis; No diarrhea or constipation. No melena or hematochezia.  GENITOURINARY: No dysuria, frequency, hematuria, or incontinence  NEUROLOGICAL: No headaches, memory loss, loss of strength, numbness, or tremors  SKIN: No itching, burning, rashes, or lesions   MUSCULOSKELETAL: No joint pain or swelling; No muscle, back, or extremity pain  PSYCHIATRIC: No depression, anxiety, mood swings, or difficulty sleeping      Medications:  MEDICATIONS  (STANDING):  allopurinol 100 milliGRAM(s) Oral daily  aspirin enteric coated 81 milliGRAM(s) Oral daily  atorvastatin 40 milliGRAM(s) Oral at bedtime  buMETAnide Injectable 2 milliGRAM(s) IV Push daily  buMETAnide Injectable 2 milliGRAM(s) IV Push once  clopidogrel Tablet 600 milliGRAM(s) Oral once  dextrose 40% Gel 15 Gram(s) Oral once  dextrose 5%. 1000 milliLiter(s) (50 mL/Hr) IV Continuous <Continuous>  dextrose 5%. 1000 milliLiter(s) (100 mL/Hr) IV Continuous <Continuous>  dextrose 50% Injectable 25 Gram(s) IV Push once  dextrose 50% Injectable 12.5 Gram(s) IV Push once  dextrose 50% Injectable 25 Gram(s) IV Push once  diltiazem    milliGRAM(s) Oral at bedtime  glucagon  Injectable 1 milliGRAM(s) IntraMuscular once  heparin  Infusion.  Unit(s)/Hr (24 mL/Hr) IV Continuous <Continuous>  insulin glargine Injectable (LANTUS) 25 Unit(s) SubCutaneous at bedtime  insulin lispro (ADMELOG) corrective regimen sliding scale   SubCutaneous three times a day before meals  insulin lispro (ADMELOG) corrective regimen sliding scale   SubCutaneous at bedtime  insulin lispro Injectable (ADMELOG) 10 Unit(s) SubCutaneous three times a day before meals  metolazone 5 milliGRAM(s) Oral <User Schedule>  metoprolol succinate  milliGRAM(s) Oral daily  potassium phosphate / sodium phosphate Powder (PHOS-NaK) 1 Packet(s) Oral four times a day with meals  pregabalin 100 milliGRAM(s) Oral three times a day    MEDICATIONS  (PRN):  heparin   Injectable 28135 Unit(s) IV Push every 6 hours PRN For aPTT less than 40  heparin   Injectable 5000 Unit(s) IV Push every 6 hours PRN For aPTT between 40 - 57  oxycodone    5 mG/acetaminophen 325 mG 1 Tablet(s) Oral every 12 hours PRN Severe Pain (7 - 10)            Vital Signs Last 24 Hrs  T(C): 36.7 (2022 10:10), Max: 37 (2022 00:44)  T(F): 98.1 (2022 10:10), Max: 98.6 (2022 00:44)  HR: 70 (2022 11:39) (70 - 108)  BP: 111/71 (2022 11:39) (111/71 - 136/74)  BP(mean): --  RR: 18 (2022 11:39) (18 - 18)  SpO2: 96% (2022 11:39) (94% - 97%)      VBG pH 7.40  @ 06:27  VBG pCO2 58  @ 06:27  VBG O2 sat 56.9  @ 06:27  VBG lactate 1.6  @ 06:27  VBG pH 7.40  @ 17:19  VBG pCO2 56  @ 17:19  VBG O2 sat 40.4  @ 17:19  VBG lactate 2.1  @ 17:19         @ 07:01  -   @ 07:00  --------------------------------------------------------  IN: 270 mL / OUT: 950 mL / NET: -680 mL          LABS:                        14.5   10.53 )-----------( 211      ( 2022 07:32 )             47.9     02-19    138  |  94<L>  |  61<H>  ----------------------------<  198<H>  3.5   |  29  |  2.11<H>    Ca    10.3      2022 07:17  Phos  4.1       Mg     1.9         TPro  7.9  /  Alb  3.8  /  TBili  0.7  /  DBili  x   /  AST  44<H>  /  ALT  7<L>  /  AlkPhos  104  -19      CARDIAC MARKERS ( 2022 07:17 )  x     / x     / 226 U/L / x     / 23.2 ng/mL  CARDIAC MARKERS ( 2022 00:12 )  x     / x     / 270 U/L / x     / 30.3 ng/mL      CAPILLARY BLOOD GLUCOSE      POCT Blood Glucose.: 171 mg/dL (2022 08:51)    PT/INR - ( 2022 17:23 )   PT: 17.3 sec;   INR: 1.47 ratio         PTT - ( 2022 17:23 )  PTT:59.1 sec  Urinalysis Basic - ( 2022 01:19 )    Color: Light Yellow / Appearance: Clear / S.013 / pH: x  Gluc: x / Ketone: Negative  / Bili: Negative / Urobili: Negative   Blood: x / Protein: 100 mg/dL / Nitrite: Negative   Leuk Esterase: Negative / RBC: 7 /hpf / WBC 1 /HPF   Sq Epi: x / Non Sq Epi: 1 /hpf / Bacteria: Negative        Serum Pro-Brain Natriuretic Peptide: 91606 pg/mL (22 @ 17:23)              Physical Examination:    General: No acute distress.      HEENT: Pupils equal, reactive to light.  Symmetric.    PULM: Clear to auscultation bilaterally, no significant sputum production    CVS: S1, S2    ABD: Soft, nondistended, nontender, normoactive bowel sounds, no masses    EXT: No edema, nontender    SKIN: Warm and well perfused, no rashes noted.    NEURO: Alert, oriented, interactive, nonfocal        RADIOLOGY REVIEWED  CXR: grossly clear    PULMONARY CONSULT    HPI: 66 y/o M with PMH of pleural effusion 2019 - s/p R thoracentesis then R chest tube placement with course c/b R hydroPTX - tx to [facility] for possible VATS decortication which was deferred, CAD, NSTEMI s/p 3 stents in  (stents to LAD, proximal and distal LCx), ischemic cardiomyopathy, HTN, T2DM c/b peripheral neuropathy, CVA, TIA, Afib on Pradaxa, chronic RLE wound, L carotid stenosis s/p endarterectomy (). Presents to ED with acute onset chest pain for one day. Patient also had associated shortness of breath, states LE edema worsening over the past few days. He was unable to lay flat, sat up in bed all night, Usually uses 2 pillows. Dyspnea worse on exertion. Reported to miss few days of metoprolol. Endorses medication compliance with all other medications including diuretics and Pradaxa. CXR grossly clear. Labs notable for elevated troponin and proBNP. Chest pain improving since admission, remains SOB.           PAST MEDICAL & SURGICAL HISTORY:  HTN (hypertension), benign  HLD (hyperlipidemia)  DM type 2 (diabetes mellitus, type 2)  TIA (transient ischemic attack)  Atrial fibrillation  MI (myocardial infarction)  circa   CAD (coronary artery disease)  Neuropathy  Status post angioplasty with stent  LULU x 3 2014  S/P carotid endarterectomy  left      Allergies  No Known Allergies      FAMILY HISTORY:  Family history of heart disease  father    Family history of CVA  mother      Social history: never a smoker     Review of Systems:  CONSTITUTIONAL: No fever, chills, or fatigue  EYES: No eye pain, visual disturbances, or discharge  ENMT:  No difficulty hearing, tinnitus, vertigo; No sinus or throat pain  NECK: No pain or stiffness  RESPIRATORY: Per above  CARDIOVASCULAR: Per above   GASTROINTESTINAL: No abdominal or epigastric pain. No nausea, vomiting, or hematemesis; No diarrhea or constipation. No melena or hematochezia.  GENITOURINARY: No dysuria, frequency, hematuria, or incontinence  NEUROLOGICAL: No headaches, memory loss, loss of strength, numbness, or tremors  SKIN: No itching, burning, rashes, or lesions   MUSCULOSKELETAL: No joint pain or swelling; No muscle, back, or extremity pain  PSYCHIATRIC: No depression, anxiety, mood swings, or difficulty sleeping      Medications:  MEDICATIONS  (STANDING):  allopurinol 100 milliGRAM(s) Oral daily  aspirin enteric coated 81 milliGRAM(s) Oral daily  atorvastatin 40 milliGRAM(s) Oral at bedtime  buMETAnide Injectable 2 milliGRAM(s) IV Push daily  buMETAnide Injectable 2 milliGRAM(s) IV Push once  clopidogrel Tablet 600 milliGRAM(s) Oral once  dextrose 40% Gel 15 Gram(s) Oral once  dextrose 5%. 1000 milliLiter(s) (50 mL/Hr) IV Continuous <Continuous>  dextrose 5%. 1000 milliLiter(s) (100 mL/Hr) IV Continuous <Continuous>  dextrose 50% Injectable 25 Gram(s) IV Push once  dextrose 50% Injectable 12.5 Gram(s) IV Push once  dextrose 50% Injectable 25 Gram(s) IV Push once  diltiazem    milliGRAM(s) Oral at bedtime  glucagon  Injectable 1 milliGRAM(s) IntraMuscular once  heparin  Infusion.  Unit(s)/Hr (24 mL/Hr) IV Continuous <Continuous>  insulin glargine Injectable (LANTUS) 25 Unit(s) SubCutaneous at bedtime  insulin lispro (ADMELOG) corrective regimen sliding scale   SubCutaneous three times a day before meals  insulin lispro (ADMELOG) corrective regimen sliding scale   SubCutaneous at bedtime  insulin lispro Injectable (ADMELOG) 10 Unit(s) SubCutaneous three times a day before meals  metolazone 5 milliGRAM(s) Oral <User Schedule>  metoprolol succinate  milliGRAM(s) Oral daily  potassium phosphate / sodium phosphate Powder (PHOS-NaK) 1 Packet(s) Oral four times a day with meals  pregabalin 100 milliGRAM(s) Oral three times a day    MEDICATIONS  (PRN):  heparin   Injectable 72471 Unit(s) IV Push every 6 hours PRN For aPTT less than 40  heparin   Injectable 5000 Unit(s) IV Push every 6 hours PRN For aPTT between 40 - 57  oxycodone    5 mG/acetaminophen 325 mG 1 Tablet(s) Oral every 12 hours PRN Severe Pain (7 - 10)            Vital Signs Last 24 Hrs  T(C): 36.7 (2022 10:10), Max: 37 (2022 00:44)  T(F): 98.1 (2022 10:10), Max: 98.6 (2022 00:44)  HR: 70 (2022 11:39) (70 - 108)  BP: 111/71 (2022 11:39) (111/71 - 136/74)  BP(mean): --  RR: 18 (2022 11:39) (18 - 18)  SpO2: 96% (2022 11:39) (94% - 97%)      VBG pH 7.40  @ 06:27  VBG pCO2 58  @ 06:27  VBG O2 sat 56.9  @ 06:27  VBG lactate 1.6  @ 06:27  VBG pH 7.40  @ 17:19  VBG pCO2 56  @ 17:19  VBG O2 sat 40.4  @ 17:19  VBG lactate 2.1  @ 17:19         @ 07:01  -  19 @ 07:00  --------------------------------------------------------  IN: 270 mL / OUT: 950 mL / NET: -680 mL          LABS:                        14.5   10.53 )-----------( 211      ( 2022 07:32 )             47.9     02-19    138  |  94<L>  |  61<H>  ----------------------------<  198<H>  3.5   |  29  |  2.11<H>    Ca    10.3      2022 07:17  Phos  4.1       Mg     1.9         TPro  7.9  /  Alb  3.8  /  TBili  0.7  /  DBili  x   /  AST  44<H>  /  ALT  7<L>  /  AlkPhos  104  -19      CARDIAC MARKERS ( 2022 07:17 )  x     / x     / 226 U/L / x     / 23.2 ng/mL  CARDIAC MARKERS ( 2022 00:12 )  x     / x     / 270 U/L / x     / 30.3 ng/mL      CAPILLARY BLOOD GLUCOSE      POCT Blood Glucose.: 171 mg/dL (2022 08:51)    PT/INR - ( 2022 17:23 )   PT: 17.3 sec;   INR: 1.47 ratio         PTT - ( 2022 17:23 )  PTT:59.1 sec  Urinalysis Basic - ( 2022 01:19 )    Color: Light Yellow / Appearance: Clear / S.013 / pH: x  Gluc: x / Ketone: Negative  / Bili: Negative / Urobili: Negative   Blood: x / Protein: 100 mg/dL / Nitrite: Negative   Leuk Esterase: Negative / RBC: 7 /hpf / WBC 1 /HPF   Sq Epi: x / Non Sq Epi: 1 /hpf / Bacteria: Negative        Serum Pro-Brain Natriuretic Peptide: 00593 pg/mL (22 @ 17:23)              Physical Examination:    General: No acute distress.      HEENT: Pupils equal, reactive to light.  Symmetric.    PULM: Clear to auscultation bilaterally, no significant sputum production    CVS: S1, S2    ABD: Soft, nondistended, nontender, normoactive bowel sounds, no masses    EXT: +3 bilateral LE edema    SKIN: Warm and well perfused, no rashes noted.    NEURO: Alert, oriented, interactive, nonfocal        RADIOLOGY REVIEWED  CXR: grossly clear, ?small L effusion   elevated L hemidiaphragm

## 2022-02-20 NOTE — CHART NOTE - NSCHARTNOTEFT_GEN_A_CORE
Patient with type 2 DM with persistent elevated F.S. , patient has seen [facility] in the past. [facility] called for eval   CAPILLARY BLOOD GLUCOSE  POCT Blood Glucose.: 337 mg/dL (20 Feb 2022 12:27)  POCT Blood Glucose.: 156 mg/dL (20 Feb 2022 08:04)  POCT Blood Glucose.: 169 mg/dL (19 Feb 2022 21:32)  POCT Blood Glucose.: 227 mg/dL (19 Feb 2022 17:58)  POCT Blood Glucose.: 207 mg/dL (19 Feb 2022 14:50)  A/P Uncontrolled fingersticks with premeals and lantus   [facility] end[facility] evaluation

## 2022-02-20 NOTE — PROGRESS NOTE ADULT - ASSESSMENT
67M with PMH of CAD, NSTEMI s/p 3 stents in 2014 (stents to LAD, proximal and distal LCx), ischemic cardiomyopathy, HTN, T2DM c/b peripheral neuropathy, CVA, TIA, Afib on Pradaxa, chronic RLE wound, L carotid stenosis s/p endarterectomy (2014) presents to the ED with acute onset chest pain and sob     Problem/Plan - 1:  ·  Problem: Chest pain.   ·  Plan: Patient presents with atypical chest pain, possibly 2/2 Afib. Chest pain now resolved. Troponin elevated   - cardiology recs appreciated  c/w meds    - f/u TTE to assess cardiac function  - ischemic eval per cardiology./cath>likely      Problem/Plan - 2:  ·  Problem: Cardiac arrhythmia.  ·  Plan: Patient had a run of VT on tele however asymptomatic  - currently Afib on tele  -c/w meds     Problem/Plan - 3:  ·  Problem: CAD (coronary artery disease).  ·  Plan: Patient has a history of CAD s/p 3 stents  c/w meds  chf hx       ·  Problem: Carotid stenosis.  ·  Plan: Patient has a history of carotic stenosis  - MRA showed greater than 75% stenosis of the R distal common carotic a, 60% stenosis of the prximal L internal carotid a  - continue aspirin qd  - continue statin qd.     Problem/Plan - 6:  ·  Problem: Cardiomyopathy, ischemic.  ·  Plan: Patient has a history of CHF  - continue diuresis with bumex, metolazone  - strict Is&Os  - daily weights  - continue aspirin, beta blocker, statin.      ·  Problem: Chronic kidney disease, unspecified CKD stage  renal eval/called dr hina cain  - continue to monitor  - strict Is&Os.    ·  Problem: Afib.  ·  Plan: - continue pradaxa BID  - continue metoprolol 100mg qd.      ·  Problem: Hypertension.c/w meds  - monitor blood pressures closely.      ·  Problem: T2DM (type 2 diabetes mellitus).  ·  Plan; T2DM on Novolog 10 TID (+ additional units if BG is higher), Basaglar 25U qhs at home   admelog 10 TID, Lantus 25U qhs  - monitor blood glucose closely in the setting of CKD.    ·  Problem: Preventive measure.   ·  Plan: - DVT ppx: Pradaxa  - Diet: DASH/CC  - Dispo: pending clinical improvement.  wound care for foot

## 2022-02-20 NOTE — PROGRESS NOTE ADULT - SUBJECTIVE AND OBJECTIVE BOX
CARDIOLOGY FOLLOW UP NOTE - DR. SAGASTUME    Patient Name: DYLAN DEL CASTILLO  Date of Service: 02-20-22 @ 10:49    Patient seen and examined  feels better  no chest pain  less dyspnea      Subjective:    cv: denies chest pain, less dyspnea, palpitations, dizziness  pulmonary: denies cough  GI: denies abdominal pain, nausea, vomiting  vascular/legs: no edema   skin: no rash  ROS: otherwise negative   overnight events:      PHYSICAL EXAM:  T(C): 36.3 (02-20-22 @ 05:57), Max: 37.2 (02-19-22 @ 21:52)  HR: 66 (02-20-22 @ 05:57) (66 - 100)  BP: 109/71 (02-20-22 @ 05:57) (106/60 - 155/78)  RR: 18 (02-20-22 @ 05:57) (17 - 18)  SpO2: 95% (02-20-22 @ 05:57) (95% - 100%)  Wt(kg): --  I&O's Summary    19 Feb 2022 07:01  -  20 Feb 2022 07:00  --------------------------------------------------------  IN: 1749 mL / OUT: 2100 mL / NET: -351 mL      Daily     Daily     Appearance: Normal	  Cardiovascular: Normal S1 S2, irreg   Respiratory: Lungs clear to auscultation	  Gastrointestinal:  Soft, Non-tender, + BS	  dec bs bases  Extremities: Normal range of motion, less edema    Home Medications:  acetaminophen 325 mg oral tablet: 2 tab(s) orally every 6 hours, As needed, Temp greater or equal to 38C (100.4F), Mild Pain (1 - 3) (18 Feb 2022 21:56)  allopurinol 100 mg oral tablet: 1 tab(s) orally once a day (18 Feb 2022 21:56)  aspirin 81 mg oral delayed release tablet: 1 tab(s) orally once a day (18 Feb 2022 21:56)  bumetanide 2 mg oral tablet: 1 tab(s) orally once a day (18 Feb 2022 21:56)  insulin glargine 100 units/mL subcutaneous solution: 25 unit(s) subcutaneous once a day (18 Feb 2022 21:56)  metOLazone 5 mg oral tablet: 1 tab(s) orally 3 times a week (18 Feb 2022 21:56)  metoprolol succinate 50 mg oral tablet, extended release: 2 tab(s) orally once a day (18 Feb 2022 21:56)  NovoLOG FlexPen 100 units/mL injectable solution: 10 unit(s) subcutaneous 3 times a day (18 Feb 2022 21:56)  oxycodone-acetaminophen 5 mg-325 mg oral tablet: 1 tab(s) orally 2 times a day (18 Feb 2022 21:56)  Pradaxa 150 mg oral capsule: 1 cap(s) orally 2 times a day (18 Feb 2022 21:56)  pregabalin 100 mg oral capsule: 1 cap(s) orally 3 times a day (18 Feb 2022 21:56)      MEDICATIONS  (STANDING):  allopurinol 100 milliGRAM(s) Oral daily  aspirin enteric coated 81 milliGRAM(s) Oral daily  atorvastatin 40 milliGRAM(s) Oral at bedtime  buMETAnide Injectable 2 milliGRAM(s) IV Push daily  clopidogrel Tablet 75 milliGRAM(s) Oral daily  dextrose 40% Gel 15 Gram(s) Oral once  dextrose 5%. 1000 milliLiter(s) (50 mL/Hr) IV Continuous <Continuous>  dextrose 5%. 1000 milliLiter(s) (100 mL/Hr) IV Continuous <Continuous>  dextrose 50% Injectable 25 Gram(s) IV Push once  dextrose 50% Injectable 12.5 Gram(s) IV Push once  dextrose 50% Injectable 25 Gram(s) IV Push once  diltiazem    milliGRAM(s) Oral at bedtime  glucagon  Injectable 1 milliGRAM(s) IntraMuscular once  heparin  Infusion.  Unit(s)/Hr (24 mL/Hr) IV Continuous <Continuous>  insulin glargine Injectable (LANTUS) 25 Unit(s) SubCutaneous at bedtime  insulin lispro (ADMELOG) corrective regimen sliding scale   SubCutaneous three times a day before meals  insulin lispro (ADMELOG) corrective regimen sliding scale   SubCutaneous at bedtime  insulin lispro Injectable (ADMELOG) 10 Unit(s) SubCutaneous three times a day before meals  metolazone 5 milliGRAM(s) Oral <User Schedule>  metoprolol succinate  milliGRAM(s) Oral daily  pregabalin 100 milliGRAM(s) Oral three times a day      TELEMETRY: 	    ECG:  	  RADIOLOGY:   DIAGNOSTIC TESTING:  [ ] Echocardiogram:  [ ] Catheterization:  [ ] Stress Test:    OTHER: 	    LABS:	 	    CARDIAC MARKERS:        Troponin T, High Sensitivity Result: 1192 ng/L (02-19 @ 16:39)  Troponin T, High Sensitivity Result: 1127 ng/L (02-19 @ 07:17)  Troponin T, High Sensitivity Result: 971 ng/L (02-19 @ 00:12)  Troponin T, High Sensitivity Result: 932 ng/L (02-18 @ 17:23)                                12.9   14.14 )-----------( 245      ( 20 Feb 2022 02:09 )             42.2     02-19    138  |  94<L>  |  61<H>  ----------------------------<  198<H>  3.5   |  29  |  2.11<H>    Ca    10.3      19 Feb 2022 07:17  Phos  4.1     02-19  Mg     1.9     02-19    TPro  7.9  /  Alb  3.8  /  TBili  0.7  /  DBili  x   /  AST  44<H>  /  ALT  7<L>  /  AlkPhos  104  02-19    proBNP:   PT/INR - ( 18 Feb 2022 17:23 )   PT: 17.3 sec;   INR: 1.47 ratio         PTT - ( 20 Feb 2022 02:09 )  PTT:168.8 sec  Lipid Profile:   HgA1c:     Creatinine, Serum: 2.11 mg/dL (02-19-22 @ 07:17)  Creatinine, Serum: 2.05 mg/dL (02-18-22 @ 17:23)

## 2022-02-20 NOTE — PROGRESS NOTE ADULT - ASSESSMENT
Echo 10/17/19: EF 45%, Grossly borderline left ventricular systolic dysfunction.   ** Compared with echocardiogram of 12/29/2018, no significant changes noted (reported LVEF in last study is slightly lower, but atrial fibrillation makes it difficult to assess ejection fraction).    a/p  66 y/o male with a past medical history of HTN, DMT2 c/b LLE neuropathy, CVA/TIA, Afib (on Pradaxa , MI, CAD (NSTEMI) x 3 stents in 2014 to LAD, and proximal and distal LCx, ischemic cardiomyopathy, chronic unhealing RLE wound s/p angio, left CEA (2014), CKD presents with acute onset chest pain, pleuritic and inc dyspnea    #Chest pain, NSTEMI, CAD, s/p PCI  -in setting of increased AF rates off bb for 3 days  -resolved chest pain  -CKMB peaked  -c/w toprol, asa, plavix   -cont IV HEP  -echo   -plan for cath TUES if creat stable    #R carotid stenosis  -cont med tx  -MRA noted with Greater than 75% stenosis of the right distal common carotid artery. Approximately 60% stenosis of the proximal left internal carotid artery.  -Continue ASA and Statin Therapy    #Ischemic cardiomyopathy, chronic systolic/diastolic HF  -BB, ASA, Statin  -IV bumex/ po metolazone as ordered  -f/u bmp today, vol status improved, will hold diuretics if creat rising     #HTN  -stable, cont home meds  -off ACE due to hyperkalemia hx    #alex on ckd  -renal eval- c/w diuretics for now     #Afib  -rates stable  -c/w CCB and BB  -pradaxa on hold-- cont Hep gtt     -Advanced care planning discussed with patient. Advanced care planning forms discussed with patient and/or family.  Risks, benefits, and alternatives of medical/cardiac procedures were discussed in detail with all questions answered.  30 minutes were spent addressing advance care planning.      d/w floor acp  45 minutes spent on total encounter; more than 50% of the visit was spent counseling and/or coordinating care by the attending physician.

## 2022-02-20 NOTE — PROGRESS NOTE ADULT - SUBJECTIVE AND OBJECTIVE BOX
DATE OF SERVICE: 02-20-22 @ 16:45  CHIEF COMPLAINT:Patient is a 67y old  Male who presents with a chief complaint of chest pain (20 Feb 2022 15:00)    	        PAST MEDICAL & SURGICAL HISTORY:  HTN (hypertension), benign    HLD (hyperlipidemia)    DM type 2 (diabetes mellitus, type 2)    TIA (transient ischemic attack)    Atrial fibrillation    MI (myocardial infarction)  circa 2014    CAD (coronary artery disease)    Neuropathy    Status post angioplasty with stent  LULU x 3 2/7/2014    S/P carotid endarterectomy  left            REVIEW OF SYSTEMS:    NECK: No pain or stiffness  RESPIRATORY: dec sob  CARDIOVASCULAR: No chest pain, palpitations, passing out, dizziness  GASTROINTESTINAL: No abdominal or epigastric pain. No nausea, vomiting, or hematemesis;  GENITOURINARY: No dysuria, frequency, hematuria,  NEUROLOGICAL: No headaches, memory loss, loss of strength, numbness, or tremors      Medications:  MEDICATIONS  (STANDING):  allopurinol 100 milliGRAM(s) Oral daily  aspirin enteric coated 81 milliGRAM(s) Oral daily  atorvastatin 40 milliGRAM(s) Oral at bedtime  clopidogrel Tablet 75 milliGRAM(s) Oral daily  dextrose 40% Gel 15 Gram(s) Oral once  dextrose 5%. 1000 milliLiter(s) (50 mL/Hr) IV Continuous <Continuous>  dextrose 5%. 1000 milliLiter(s) (100 mL/Hr) IV Continuous <Continuous>  dextrose 50% Injectable 25 Gram(s) IV Push once  dextrose 50% Injectable 12.5 Gram(s) IV Push once  dextrose 50% Injectable 25 Gram(s) IV Push once  diltiazem    milliGRAM(s) Oral at bedtime  glucagon  Injectable 1 milliGRAM(s) IntraMuscular once  heparin  Infusion.  Unit(s)/Hr (24 mL/Hr) IV Continuous <Continuous>  insulin glargine Injectable (LANTUS) 25 Unit(s) SubCutaneous at bedtime  insulin lispro (ADMELOG) corrective regimen sliding scale   SubCutaneous three times a day before meals  insulin lispro (ADMELOG) corrective regimen sliding scale   SubCutaneous at bedtime  insulin lispro Injectable (ADMELOG) 10 Unit(s) SubCutaneous three times a day before meals  metoprolol succinate  milliGRAM(s) Oral daily  pregabalin 100 milliGRAM(s) Oral three times a day    MEDICATIONS  (PRN):  heparin   Injectable 35340 Unit(s) IV Push every 6 hours PRN For aPTT less than 40  heparin   Injectable 5000 Unit(s) IV Push every 6 hours PRN For aPTT between 40 - 57  oxycodone    5 mG/acetaminophen 325 mG 1 Tablet(s) Oral every 12 hours PRN Severe Pain (7 - 10)    	    PHYSICAL EXAM:  T(C): 36.3 (02-20-22 @ 14:36), Max: 37.2 (02-19-22 @ 21:52)  HR: 76 (02-20-22 @ 14:36) (66 - 86)  BP: 112/75 (02-20-22 @ 14:36) (106/60 - 148/81)  RR: 18 (02-20-22 @ 14:36) (17 - 18)  SpO2: 97% (02-20-22 @ 14:36) (95% - 100%)  Wt(kg): --  I&O's Summary    19 Feb 2022 07:01  -  20 Feb 2022 07:00  --------------------------------------------------------  IN: 1749 mL / OUT: 2100 mL / NET: -351 mL    20 Feb 2022 07:01  -  20 Feb 2022 16:45  --------------------------------------------------------  IN: 550 mL / OUT: 800 mL / NET: -250 mL        Appearance: Normal	  HEENT:   Normal oral mucosa, PERRL, EOMI	  Lymphatic: No lymphadenopathy  Cardiovascular: Normal S1 S2, No JVD  Respiratory: dec bs   Psychiatry: A & O  Gastrointestinal:  Soft, Non-tender, + BS	    Neurologic: Non-focal  Extremities: + edema  pvd    TELEMETRY: 	    ECG:  	  RADIOLOGY:  OTHER: 	  	  LABS:	 	    CARDIAC MARKERS:  CARDIAC MARKERS ( 20 Feb 2022 12:23 )  x     / x     / 88 U/L / x     / 8.1 ng/mL  CARDIAC MARKERS ( 20 Feb 2022 02:09 )  x     / x     / 123 U/L / x     / x      CARDIAC MARKERS ( 19 Feb 2022 16:39 )  x     / x     / 154 U/L / x     / x      CARDIAC MARKERS ( 19 Feb 2022 07:17 )  x     / x     / 226 U/L / x     / 23.2 ng/mL  CARDIAC MARKERS ( 19 Feb 2022 00:12 )  x     / x     / 270 U/L / x     / 30.3 ng/mL                                13.2   11.08 )-----------( 201      ( 20 Feb 2022 12:23 )             43.7     02-20    138  |  93<L>  |  78<H>  ----------------------------<  270<H>  4.1   |  29  |  2.89<H>    Ca    9.5      20 Feb 2022 12:23  Phos  5.0     02-20  Mg     1.9     02-19    TPro  7.9  /  Alb  3.8  /  TBili  0.7  /  DBili  x   /  AST  44<H>  /  ALT  7<L>  /  AlkPhos  104  02-19    proBNP:   Lipid Profile:   HgA1c:   TSH:

## 2022-02-20 NOTE — CONSULT NOTE ADULT - ATTENDING COMMENTS
67M uncontrolled DM2 HBA1c 10.1%, CAD s/p stents.  Inpatient basal bolus insulin as outlined.  For dc basal/bolus insulin plus add Trulicity 0.75 mg SQ weekly. He had been prescribed this but never started it due to fear of side effects, has it I his fridge at home. Discussed many benefits of GLP1RA and reviewed rarity of risks and he now agrees to start after dc.  Endocrine team consulted for uncontrolled diabetes. Patient is high risk with high level decision making due to uncontrolled diabetes which places patient at high risk for cardiovascular and cerebrovascular events. Patient with lability of glucose requiring close monitoring and insulin adjustments.    Ari Gardner MD  Division of Endocrinology  Pager: [phone]    If after 6PM or before 9AM, or on weekends/holidays, please call endocrine answering service for assistance ([phone]).  For nonurgent matters email [email or web address] for assistance.
sob likely cardiac in origin  dw pt

## 2022-02-20 NOTE — CONSULT NOTE ADULT - SUBJECTIVE AND OBJECTIVE BOX
HPI:  67M with PMH of CAD, NSTEMI s/p 3 stents in 2014 (stents to LAD, proximal and distal LCx), ischemic cardiomyopathy, HTN, T2DM c/b peripheral neuropathy, CVA, TIA, Afib on Pradaxa, chronic RLE wound, L carotid stenosis s/p endarterectomy (2014) presents to the ED with acute onset chest pain for one day. Also with SOB. Endocrine consulted for T2DM management.    Patient diagnosed with DM2 >20 years ago. Patient was initially started on Metformin, however discontinued due to CKD. Patient then started on Glimepiride which he took until 12/2018 when patient was started on basal/bolus insulin. Patient had been following with Dr. Real, endocrinologist in 2019 but has not seen her since. Patient currently takes Lantus 25units qhs and Humalog 8-10units premeal. Patient states he checks his fingerstick before meals. AM fasting 180-240, before lunch 240-300, D 240-400. Patient states he rarely gets hypoglycemic events. He experiences lightheadedness and tremors which improves with juice. Patient does not report changes in vision however has not seen eye doctor in over 1 year. Patient does report neuropathy and follows with podiatry. Reports history of DM in father.         PAST MEDICAL & SURGICAL HISTORY:  HTN (hypertension), benign    HLD (hyperlipidemia)    DM type 2 (diabetes mellitus, type 2)    TIA (transient ischemic attack)    Atrial fibrillation    MI (myocardial infarction)  circa 2014    CAD (coronary artery disease)    Neuropathy    Status post angioplasty with stent  LULU x 3 2/7/2014    S/P carotid endarterectomy  left        FAMILY HISTORY:  Family history of heart disease  father    Family history of CVA  mother        Social History:    Outpatient Medications: Home Medications:  acetaminophen 325 mg oral tablet: 2 tab(s) orally every 6 hours, As needed, Temp greater or equal to 38C (100.4F), Mild Pain (1 - 3) (18 Feb 2022 21:56)  allopurinol 100 mg oral tablet: 1 tab(s) orally once a day (18 Feb 2022 21:56)  aspirin 81 mg oral delayed release tablet: 1 tab(s) orally once a day (18 Feb 2022 21:56)  bumetanide 2 mg oral tablet: 1 tab(s) orally once a day (18 Feb 2022 21:56)  insulin glargine 100 units/mL subcutaneous solution: 25 unit(s) subcutaneous once a day (18 Feb 2022 21:56)  metOLazone 5 mg oral tablet: 1 tab(s) orally 3 times a week (18 Feb 2022 21:56)  metoprolol succinate 50 mg oral tablet, extended release: 2 tab(s) orally once a day (18 Feb 2022 21:56)  NovoLOG FlexPen 100 units/mL injectable solution: 10 unit(s) subcutaneous 3 times a day (18 Feb 2022 21:56)  oxycodone-acetaminophen 5 mg-325 mg oral tablet: 1 tab(s) orally 2 times a day (18 Feb 2022 21:56)  Pradaxa 150 mg oral capsule: 1 cap(s) orally 2 times a day (18 Feb 2022 21:56)  pregabalin 100 mg oral capsule: 1 cap(s) orally 3 times a day (18 Feb 2022 21:56)      MEDICATIONS  (STANDING):  allopurinol 100 milliGRAM(s) Oral daily  aspirin enteric coated 81 milliGRAM(s) Oral daily  atorvastatin 40 milliGRAM(s) Oral at bedtime  clopidogrel Tablet 75 milliGRAM(s) Oral daily  dextrose 40% Gel 15 Gram(s) Oral once  dextrose 5%. 1000 milliLiter(s) (50 mL/Hr) IV Continuous <Continuous>  dextrose 5%. 1000 milliLiter(s) (100 mL/Hr) IV Continuous <Continuous>  dextrose 50% Injectable 25 Gram(s) IV Push once  dextrose 50% Injectable 12.5 Gram(s) IV Push once  dextrose 50% Injectable 25 Gram(s) IV Push once  diltiazem    milliGRAM(s) Oral at bedtime  glucagon  Injectable 1 milliGRAM(s) IntraMuscular once  heparin  Infusion.  Unit(s)/Hr (24 mL/Hr) IV Continuous <Continuous>  insulin glargine Injectable (LANTUS) 25 Unit(s) SubCutaneous at bedtime  insulin lispro (ADMELOG) corrective regimen sliding scale   SubCutaneous three times a day before meals  insulin lispro (ADMELOG) corrective regimen sliding scale   SubCutaneous at bedtime  insulin lispro Injectable (ADMELOG) 10 Unit(s) SubCutaneous three times a day before meals  metoprolol succinate  milliGRAM(s) Oral daily  pregabalin 100 milliGRAM(s) Oral three times a day    MEDICATIONS  (PRN):  heparin   Injectable 65595 Unit(s) IV Push every 6 hours PRN For aPTT less than 40  heparin   Injectable 5000 Unit(s) IV Push every 6 hours PRN For aPTT between 40 - 57  oxycodone    5 mG/acetaminophen 325 mG 1 Tablet(s) Oral every 12 hours PRN Severe Pain (7 - 10)      Allergies    No Known Allergies    Intolerances      Review of Systems:  Constitutional: No fever, chills   Neuro: No tremors, headache   Cardiovascular: No chest pain, palpitations  Respiratory: No SOB, no cough  GI: No nausea, vomiting, abdominal pain  : No dysuria, polyuria   Skin: no rash, ulcers   Psych: no depression, anxiety   Endocrine: no polyphagia, polydipsia     ALL OTHER SYSTEMS REVIEWED AND NEGATIVE        PHYSICAL EXAM:  VITALS: T(C): 36.3 (02-20-22 @ 14:36)  T(F): 97.3 (02-20-22 @ 14:36), Max: 98.9 (02-19-22 @ 21:52)  HR: 76 (02-20-22 @ 14:36) (66 - 86)  BP: 112/75 (02-20-22 @ 14:36) (106/60 - 148/81)  RR:  (17 - 18)  SpO2:  (95% - 100%)  Wt(kg): --  GENERAL: NAD, well-groomed, well-developed  EYES: No proptosis, anicteric  HEENT:  Atraumatic, Normocephalic, moist mucous membranes  THYROID: Normal size, no palpable nodules  RESPIRATORY: Clear to auscultation bilaterally; No rales, rhonchi, wheezing  CARDIOVASCULAR: Regular rate and rhythm; No murmurs  GI: Soft, nontender, non distended, normal bowel sounds  SKIN: Dry, intact, No rashes or lesions  NEURO: AOx3, moves all extremities spontaneously   PSYCH: Reactive affect, euthymic mood    POCT Blood Glucose.: 337 mg/dL (02-20-22 @ 12:27)  POCT Blood Glucose.: 156 mg/dL (02-20-22 @ 08:04)  POCT Blood Glucose.: 169 mg/dL (02-19-22 @ 21:32)  POCT Blood Glucose.: 227 mg/dL (02-19-22 @ 17:58)  POCT Blood Glucose.: 207 mg/dL (02-19-22 @ 14:50)  POCT Blood Glucose.: 242 mg/dL (02-19-22 @ 12:59)  POCT Blood Glucose.: 171 mg/dL (02-19-22 @ 08:51)  POCT Blood Glucose.: 195 mg/dL (02-19-22 @ 06:12)  POCT Blood Glucose.: 234 mg/dL (02-18-22 @ 23:43)                            13.2   11.08 )-----------( 201      ( 20 Feb 2022 12:23 )             43.7       02-20    138  |  93<L>  |  78<H>  ----------------------------<  270<H>  4.1   |  29  |  2.89<H>    EGFR if : 25<L>  EGFR if non : 22<L>    Ca    9.5      02-20  Mg     1.9     02-19  Phos  5.0     02-20    TPro  7.9  /  Alb  3.8  /  TBili  0.7  /  DBili  x   /  AST  44<H>  /  ALT  7<L>  /  AlkPhos  104  02-19      Thyroid Function Tests:              Radiology:                HPI:  67M with PMH of CAD, NSTEMI s/p 3 stents in 2014 (stents to LAD, proximal and distal LCx), ischemic cardiomyopathy, HTN, T2DM c/b peripheral neuropathy, CVA, TIA, Afib on Pradaxa, chronic RLE wound, L carotid stenosis s/p endarterectomy (2014) presents to the ED with acute onset chest pain for one day. Also with SOB. Endocrine consulted for T2DM management.    Patient diagnosed with DM2 >20 years ago. Patient was initially started on Metformin, however discontinued due to CKD. Patient then started on Glimepiride which he took until 12/2018 when patient was started on basal/bolus insulin. Patient had been following with Dr. Real, endocrinologist in 2019 but has not seen her since. Now follows with an Endocrinologist through his PCP's office Dr. Gallegos. Patient currently takes Basaglar 25units qhs and Novolog 10 units pre-meal. Also uses pre-meal moderate correction scale starting at BG >200. Was prescribed Trulicity 3 months ago but was concerned about the side effects so did not try it. Patient states he checks his fingerstick 3-4x/day. AM fasting and pre-lunch and pre-dinner 175-225. Lowest was 90 and it is rarely that low. No low sxs. Has neuropathy in feet. No blurred vision or polyuria/polydipsia. Saw eye doctor 3 months ago, no retinopathy but had cataract surgery in right eye.    PAST MEDICAL & SURGICAL HISTORY:  HTN (hypertension), benign    HLD (hyperlipidemia)    DM type 2 (diabetes mellitus, type 2)    TIA (transient ischemic attack)    Atrial fibrillation    MI (myocardial infarction)  circa 2014    CAD (coronary artery disease)    Neuropathy    Status post angioplasty with stent  LULU x 3 2/7/2014    S/P carotid endarterectomy  left        FAMILY HISTORY:  Family history of heart disease  father    Family history of CVA  mother    Mother and father with DM.        Social History: No tobacco use or EtOH use.    Outpatient Medications: Home Medications:  acetaminophen 325 mg oral tablet: 2 tab(s) orally every 6 hours, As needed, Temp greater or equal to 38C (100.4F), Mild Pain (1 - 3) (18 Feb 2022 21:56)  allopurinol 100 mg oral tablet: 1 tab(s) orally once a day (18 Feb 2022 21:56)  aspirin 81 mg oral delayed release tablet: 1 tab(s) orally once a day (18 Feb 2022 21:56)  bumetanide 2 mg oral tablet: 1 tab(s) orally once a day (18 Feb 2022 21:56)  insulin glargine 100 units/mL subcutaneous solution: 25 unit(s) subcutaneous once a day (18 Feb 2022 21:56)  metOLazone 5 mg oral tablet: 1 tab(s) orally 3 times a week (18 Feb 2022 21:56)  metoprolol succinate 50 mg oral tablet, extended release: 2 tab(s) orally once a day (18 Feb 2022 21:56)  NovoLOG FlexPen 100 units/mL injectable solution: 10 unit(s) subcutaneous 3 times a day (18 Feb 2022 21:56)  oxycodone-acetaminophen 5 mg-325 mg oral tablet: 1 tab(s) orally 2 times a day (18 Feb 2022 21:56)  Pradaxa 150 mg oral capsule: 1 cap(s) orally 2 times a day (18 Feb 2022 21:56)  pregabalin 100 mg oral capsule: 1 cap(s) orally 3 times a day (18 Feb 2022 21:56)      MEDICATIONS  (STANDING):  allopurinol 100 milliGRAM(s) Oral daily  aspirin enteric coated 81 milliGRAM(s) Oral daily  atorvastatin 40 milliGRAM(s) Oral at bedtime  clopidogrel Tablet 75 milliGRAM(s) Oral daily  dextrose 40% Gel 15 Gram(s) Oral once  dextrose 5%. 1000 milliLiter(s) (50 mL/Hr) IV Continuous <Continuous>  dextrose 5%. 1000 milliLiter(s) (100 mL/Hr) IV Continuous <Continuous>  dextrose 50% Injectable 25 Gram(s) IV Push once  dextrose 50% Injectable 12.5 Gram(s) IV Push once  dextrose 50% Injectable 25 Gram(s) IV Push once  diltiazem    milliGRAM(s) Oral at bedtime  glucagon  Injectable 1 milliGRAM(s) IntraMuscular once  heparin  Infusion.  Unit(s)/Hr (24 mL/Hr) IV Continuous <Continuous>  insulin glargine Injectable (LANTUS) 25 Unit(s) SubCutaneous at bedtime  insulin lispro (ADMELOG) corrective regimen sliding scale   SubCutaneous three times a day before meals  insulin lispro (ADMELOG) corrective regimen sliding scale   SubCutaneous at bedtime  insulin lispro Injectable (ADMELOG) 10 Unit(s) SubCutaneous three times a day before meals  metoprolol succinate  milliGRAM(s) Oral daily  pregabalin 100 milliGRAM(s) Oral three times a day    MEDICATIONS  (PRN):  heparin   Injectable 68770 Unit(s) IV Push every 6 hours PRN For aPTT less than 40  heparin   Injectable 5000 Unit(s) IV Push every 6 hours PRN For aPTT between 40 - 57  oxycodone    5 mG/acetaminophen 325 mG 1 Tablet(s) Oral every 12 hours PRN Severe Pain (7 - 10)      Allergies    No Known Allergies    Intolerances      Review of Systems:  Constitutional:  No fever, No chills.  Eye:  No eye pain, No blurring.   Ear/Nose/Mouth/Throat:  No nasal congestion, No sore throat.   Respiratory:  No shortness of breath, No cough, No sputum production, No hemoptysis.   Cardiovascular:  No chest pain, No palpitations.   Gastrointestinal:  No nausea, No vomiting, No diarrhea.   Genitourinary:  No dysuria, No hematuria.   Endocrine:  No excessive thirst, No polyuria.   Musculoskeletal:  No back pain, No decreased range of motion.   Integumentary:  No rash, + right LE skin lesion.  Neurologic:  Alert and oriented X4, No confusion, + numbness.  Additional ROS reviewed and negative except as indicated in HPI.        PHYSICAL EXAM:  VITALS: T(C): 36.3 (02-20-22 @ 14:36)  T(F): 97.3 (02-20-22 @ 14:36), Max: 98.9 (02-19-22 @ 21:52)  HR: 76 (02-20-22 @ 14:36) (66 - 86)  BP: 112/75 (02-20-22 @ 14:36) (106/60 - 148/81)  RR:  (17 - 18)  SpO2:  (95% - 100%)  Wt(kg): --  General: Well-developed male, No acute distress, Speaking full sentences.   Eye:  Extraocular movements are intact, No proptosis or lid lag.   HENT:  Normocephalic.   Neck:  Supple, Non-tender.   Respiratory:  Respirations are non-labored, Symmetric chest wall expansion, Breath sounds are equal.   Cardiovascular:  Normal rate, irregular rhythm, 1+ edema b/l LE.  Gastrointestinal:  Soft, Non-tender, Non-distended.   Musculoskeletal:  Normal range of motion, No gross joint swelling.   Feet:  Ulcer left plantar foot. Normal pulses.   Integumentary:  Warm, dry. Multiple bullae b/l LE. Chronic LE venous stasis changes.  Mental Status Exam:  Orientedx4, Speech clear and coherent.   Neurologic:  Alert, Orientedx4, Normal motor function, No focal deficits, Cranial Nerves II-XII are grossly intact bilaterally.   Psychiatric:  Cooperative, Appropriate mood & affect.    POCT Blood Glucose.: 337 mg/dL (02-20-22 @ 12:27)  POCT Blood Glucose.: 156 mg/dL (02-20-22 @ 08:04)  POCT Blood Glucose.: 169 mg/dL (02-19-22 @ 21:32)  POCT Blood Glucose.: 227 mg/dL (02-19-22 @ 17:58)  POCT Blood Glucose.: 207 mg/dL (02-19-22 @ 14:50)  POCT Blood Glucose.: 242 mg/dL (02-19-22 @ 12:59)  POCT Blood Glucose.: 171 mg/dL (02-19-22 @ 08:51)  POCT Blood Glucose.: 195 mg/dL (02-19-22 @ 06:12)  POCT Blood Glucose.: 234 mg/dL (02-18-22 @ 23:43)                            13.2   11.08 )-----------( 201      ( 20 Feb 2022 12:23 )             43.7       02-20    138  |  93<L>  |  78<H>  ----------------------------<  270<H>  4.1   |  29  |  2.89<H>    EGFR if : 25<L>  EGFR if non : 22<L>    Ca    9.5      02-20  Mg     1.9     02-19  Phos  5.0     02-20    TPro  7.9  /  Alb  3.8  /  TBili  0.7  /  DBili  x   /  AST  44<H>  /  ALT  7<L>  /  AlkPhos  104  02-19      Thyroid Function Tests:              Radiology:                HPI:  67M with PMH of CAD, NSTEMI s/p 3 stents in 2014 (stents to LAD, proximal and distal LCx), ischemic cardiomyopathy, HTN, T2DM c/b peripheral neuropathy, CVA, TIA, Afib on Pradaxa, chronic RLE wound, L carotid stenosis s/p endarterectomy (2014) presents to the ED with acute onset chest pain for one day. Also with SOB. Endocrine consulted for T2DM management.    Patient diagnosed with DM2 >20 years ago. Patient was initially started on Metformin, however discontinued due to CKD. Patient then started on Glimepiride which he took until 12/2018 when patient was started on basal/bolus insulin. Patient had been following with Dr. Real, endocrinologist in 2019 but has not seen her since. Now follows with an Endocrinologist through his PCP's office Dr. Gallegos. Patient currently takes Basaglar 25units qhs and Novolog 10 units pre-meal. Also uses pre-meal moderate correction scale starting at BG >200. Was prescribed Trulicity 3 months ago but was concerned about the side effects so did not try it. Patient states he checks his fingersticks 3-4x/day. AM fasting and pre-lunch and pre-dinner 175-225. Lowest recently was 90 and it is rarely that low. No low sxs. Has neuropathy in feet. No blurred vision or polyuria/polydipsia. Saw eye doctor 3 months ago, no retinopathy but had cataract surgery in right eye.    From LE edema gets skin bullae.    PAST MEDICAL & SURGICAL HISTORY:  HTN (hypertension), benign    HLD (hyperlipidemia)    DM type 2 (diabetes mellitus, type 2)    TIA (transient ischemic attack)    Atrial fibrillation    MI (myocardial infarction)  circa 2014    CAD (coronary artery disease)    Neuropathy    Status post angioplasty with stent  LULU x 3 2/7/2014    S/P carotid endarterectomy  left        FAMILY HISTORY:  Family history of heart disease  father    Family history of CVA  mother    Mother and father with DM.        Social History: No tobacco use or EtOH use.    Outpatient Medications: Home Medications:  acetaminophen 325 mg oral tablet: 2 tab(s) orally every 6 hours, As needed, Temp greater or equal to 38C (100.4F), Mild Pain (1 - 3) (18 Feb 2022 21:56)  allopurinol 100 mg oral tablet: 1 tab(s) orally once a day (18 Feb 2022 21:56)  aspirin 81 mg oral delayed release tablet: 1 tab(s) orally once a day (18 Feb 2022 21:56)  bumetanide 2 mg oral tablet: 1 tab(s) orally once a day (18 Feb 2022 21:56)  insulin glargine 100 units/mL subcutaneous solution: 25 unit(s) subcutaneous once a day (18 Feb 2022 21:56)  metOLazone 5 mg oral tablet: 1 tab(s) orally 3 times a week (18 Feb 2022 21:56)  metoprolol succinate 50 mg oral tablet, extended release: 2 tab(s) orally once a day (18 Feb 2022 21:56)  NovoLOG FlexPen 100 units/mL injectable solution: 10 unit(s) subcutaneous 3 times a day (18 Feb 2022 21:56)  oxycodone-acetaminophen 5 mg-325 mg oral tablet: 1 tab(s) orally 2 times a day (18 Feb 2022 21:56)  Pradaxa 150 mg oral capsule: 1 cap(s) orally 2 times a day (18 Feb 2022 21:56)  pregabalin 100 mg oral capsule: 1 cap(s) orally 3 times a day (18 Feb 2022 21:56)      MEDICATIONS  (STANDING):  allopurinol 100 milliGRAM(s) Oral daily  aspirin enteric coated 81 milliGRAM(s) Oral daily  atorvastatin 40 milliGRAM(s) Oral at bedtime  clopidogrel Tablet 75 milliGRAM(s) Oral daily  dextrose 40% Gel 15 Gram(s) Oral once  dextrose 5%. 1000 milliLiter(s) (50 mL/Hr) IV Continuous <Continuous>  dextrose 5%. 1000 milliLiter(s) (100 mL/Hr) IV Continuous <Continuous>  dextrose 50% Injectable 25 Gram(s) IV Push once  dextrose 50% Injectable 12.5 Gram(s) IV Push once  dextrose 50% Injectable 25 Gram(s) IV Push once  diltiazem    milliGRAM(s) Oral at bedtime  glucagon  Injectable 1 milliGRAM(s) IntraMuscular once  heparin  Infusion.  Unit(s)/Hr (24 mL/Hr) IV Continuous <Continuous>  insulin glargine Injectable (LANTUS) 25 Unit(s) SubCutaneous at bedtime  insulin lispro (ADMELOG) corrective regimen sliding scale   SubCutaneous three times a day before meals  insulin lispro (ADMELOG) corrective regimen sliding scale   SubCutaneous at bedtime  insulin lispro Injectable (ADMELOG) 10 Unit(s) SubCutaneous three times a day before meals  metoprolol succinate  milliGRAM(s) Oral daily  pregabalin 100 milliGRAM(s) Oral three times a day    MEDICATIONS  (PRN):  heparin   Injectable 36315 Unit(s) IV Push every 6 hours PRN For aPTT less than 40  heparin   Injectable 5000 Unit(s) IV Push every 6 hours PRN For aPTT between 40 - 57  oxycodone    5 mG/acetaminophen 325 mG 1 Tablet(s) Oral every 12 hours PRN Severe Pain (7 - 10)      Allergies    No Known Allergies    Intolerances      Review of Systems:  Constitutional:  No fever, No chills.  Eye:  No eye pain, No blurring.   Ear/Nose/Mouth/Throat:  No nasal congestion, No sore throat.   Respiratory:  No shortness of breath, No cough, No sputum production, No hemoptysis.   Cardiovascular:  No chest pain, No palpitations.   Gastrointestinal:  No nausea, No vomiting, No diarrhea.   Genitourinary:  No dysuria, No hematuria.   Endocrine:  No excessive thirst, No polyuria.   Musculoskeletal:  No back pain, No decreased range of motion.   Integumentary:  No rash, + right LE skin lesions.  Neurologic:  Alert and oriented X4, No confusion, + numbness.  Additional ROS reviewed and negative except as indicated in HPI.        PHYSICAL EXAM:  VITALS: T(C): 36.3 (02-20-22 @ 14:36)  T(F): 97.3 (02-20-22 @ 14:36), Max: 98.9 (02-19-22 @ 21:52)  HR: 76 (02-20-22 @ 14:36) (66 - 86)  BP: 112/75 (02-20-22 @ 14:36) (106/60 - 148/81)  RR:  (17 - 18)  SpO2:  (95% - 100%)  Wt(kg): --  General: Well-developed male, No acute distress, Speaking full sentences.   Eye:  Extraocular movements are intact, No proptosis or lid lag.   HENT:  Normocephalic.   Neck:  Supple, Non-tender.   Respiratory:  Respirations are non-labored, Symmetric chest wall expansion, Breath sounds are equal.   Cardiovascular:  Normal rate, irregular rhythm, + edema b/l LE.  Gastrointestinal:  Soft, Non-tender, obese.   Musculoskeletal:  Normal range of motion, No gross joint swelling.   Feet:  + Ulcer left plantar foot. Normal pulses.   Integumentary:  Warm, dry. Multiple bullae b/l LE. Chronic LE venous stasis changes.  Mental Status Exam:  Orientedx4, Speech clear and coherent.   Neurologic:  Alert, Orientedx4, Normal motor function, No focal deficits, Cranial Nerves II-XII are grossly intact bilaterally.   Psychiatric:  Cooperative, Appropriate mood & affect.    POCT Blood Glucose.: 337 mg/dL (02-20-22 @ 12:27)  POCT Blood Glucose.: 156 mg/dL (02-20-22 @ 08:04)  POCT Blood Glucose.: 169 mg/dL (02-19-22 @ 21:32)  POCT Blood Glucose.: 227 mg/dL (02-19-22 @ 17:58)  POCT Blood Glucose.: 207 mg/dL (02-19-22 @ 14:50)  POCT Blood Glucose.: 242 mg/dL (02-19-22 @ 12:59)  POCT Blood Glucose.: 171 mg/dL (02-19-22 @ 08:51)  POCT Blood Glucose.: 195 mg/dL (02-19-22 @ 06:12)  POCT Blood Glucose.: 234 mg/dL (02-18-22 @ 23:43)                            13.2   11.08 )-----------( 201      ( 20 Feb 2022 12:23 )             43.7       02-20    138  |  93<L>  |  78<H>  ----------------------------<  270<H>  4.1   |  29  |  2.89<H>    EGFR if : 25<L>  EGFR if non : 22<L>    Ca    9.5      02-20  Mg     1.9     02-19  Phos  5.0     02-20    TPro  7.9  /  Alb  3.8  /  TBili  0.7  /  DBili  x   /  AST  44<H>  /  ALT  7<L>  /  AlkPhos  104  02-19      Thyroid Function Tests:              Radiology:

## 2022-02-20 NOTE — PROGRESS NOTE ADULT - SUBJECTIVE AND OBJECTIVE BOX
No pain, no shortness of breath. He is very thirsty. Scr increased to 2.89 mg/dl on bumex 2mg IV daily and with metolazone 5 mg po daily. He usually drinks 4 liters per day.     Review of systems: All 10 points ROS was obtained except as above.     allopurinol 100 milliGRAM(s) Oral daily  aspirin enteric coated 81 milliGRAM(s) Oral daily  atorvastatin 40 milliGRAM(s) Oral at bedtime  clopidogrel Tablet 75 milliGRAM(s) Oral daily  dextrose 40% Gel 15 Gram(s) Oral once  dextrose 5%. 1000 milliLiter(s) IV Continuous <Continuous>  dextrose 5%. 1000 milliLiter(s) IV Continuous <Continuous>  dextrose 50% Injectable 25 Gram(s) IV Push once  dextrose 50% Injectable 12.5 Gram(s) IV Push once  dextrose 50% Injectable 25 Gram(s) IV Push once  diltiazem    milliGRAM(s) Oral at bedtime  glucagon  Injectable 1 milliGRAM(s) IntraMuscular once  heparin   Injectable 95375 Unit(s) IV Push every 6 hours PRN  heparin   Injectable 5000 Unit(s) IV Push every 6 hours PRN  heparin  Infusion.  Unit(s)/Hr IV Continuous <Continuous>  insulin glargine Injectable (LANTUS) 25 Unit(s) SubCutaneous at bedtime  insulin lispro (ADMELOG) corrective regimen sliding scale   SubCutaneous three times a day before meals  insulin lispro (ADMELOG) corrective regimen sliding scale   SubCutaneous at bedtime  insulin lispro Injectable (ADMELOG) 10 Unit(s) SubCutaneous three times a day before meals  metoprolol succinate  milliGRAM(s) Oral daily  oxycodone    5 mG/acetaminophen 325 mG 1 Tablet(s) Oral every 12 hours PRN  pregabalin 100 milliGRAM(s) Oral three times a day      VITAL:  T(C): , Max: 37.2 (22 @ 21:52)  T(F): , Max: 98.9 (22 @ 21:52)  HR: 66 (22 @ 05:57)  BP: 109/71 (22 @ 05:57)  RR: 18 (22 @ 05:57)  SpO2: 95% (22 @ 05:57)      22 @ 07:01  -  22 @ 07:00  --------------------------------------------------------  IN: 1749 mL / OUT: 2100 mL / NET: -351 mL    22 @ 07:01  -  22 @ 13:44  --------------------------------------------------------  IN: 350 mL / OUT: 500 mL / NET: -150 mL        PHYSICAL EXAM:  General: NAD; Alert  HEENT:  NCAT; PERRLA  Neck: No JVD; supple  Respiratory: CTA-b/l  Cardiac: RRR s1s2  Gastrointestinal: BS+, soft, NT/ND  Urologic: No keyes  Extremities: 1+ peripheral edema no weeping edema      LABS:                          13.2   11.08 )-----------( 201      ( 2022 12:23 )             43.7     Na(138)/K(4.1)/Cl(93)/HCO3(29)/BUN(78)/Cr(2.89)Glu(270)/Ca(9.5)/Mg(--)/PO4(5.0)     @ 12:23  Na(138)/K(3.5)/Cl(94)/HCO3(29)/BUN(61)/Cr(2.11)Glu(198)/Ca(10.3)/Mg(1.9)/PO4(4.1)     @ 07:17  Na(137)/K(3.8)/Cl(93)/HCO3(29)/BUN(61)/Cr(2.05)Glu(229)/Ca(10.7)/Mg(1.9)/PO4(3.8)     @ 17:23    Urinalysis Basic - ( 2022 23:07 )    Color: Light Yellow / Appearance: Clear / S.012 / pH: x  Gluc: x / Ketone: Negative  / Bili: Negative / Urobili: Negative   Blood: x / Protein: 30 mg/dL / Nitrite: Negative   Leuk Esterase: Negative / RBC: 2 /hpf / WBC 1 /HPF   Sq Epi: x / Non Sq Epi: 1 /hpf / Bacteria: Negative      Creatinine, Random Urine: 73 mg/dL ( @ 23:08)  Protein/Creatinine Ratio Calculation: 0.8 Ratio ( @ 23:08)  Chloride, Random Urine: 64 mmol/L ( @ 23:08)  Sodium, Random Urine: 55 mmol/L ( @ 23:08)        ASSESSMENT/PLAN  Mr. Stevens is an 67 year old gentleman with PMH of CAD, NSTEMI s/p 3 stents in  (stents to LAD, proximal and distal LCx), ischemic cardiomyopathy, HTN, T2DM c/b peripheral neuropathy, CVA, TIA, Afib on Pradaxa, chronic RLE wound, L carotid stenosis s/p endarterectomy () presents to the ED with acute onset chest pain and sob. Nephrology consulted for CKD stage 3 and management of his diuretics.    1. Now ANT on CKD stage 3 with serum creatinine of 2.89 mg/dl due to aggressive diuresis. He went from weeping edema to 1 + edema.  2. Mild hypervolemia.   3. Electrolytes are acceptable.   4. Elevated bicarb in the setting of possible CO2 retention.   5. Cardiomyopathy defer to primary  6. HFpEF  7. Proteinuria of 0.8 grams. 2 RBC, 1 WBC    RECOMMEND:  Bumex 2mg IV daily and metolazone 5 mg po daily has been discontinued  pt is on fluid restriction of 1 liter can increase to 1.5 liters  check orthostatics  allopurinol 100 mg daily   cardizem cd 120 mg daily   Strict intake and output  BMP, CBC, and phosphorus in am      Thank you for involving Nephrology in this patient's care.    With warm regards,    Katie Ferrer DO

## 2022-02-20 NOTE — CONSULT NOTE ADULT - ASSESSMENT
67M with PMH of CAD, NSTEMI s/p 3 stents in 2014 (stents to LAD, proximal and distal LCx), ischemic cardiomyopathy, HTN, T2DM c/b peripheral neuropathy, CVA, TIA, Afib on Pradaxa, chronic RLE wound, L carotid stenosis s/p endarterectomy (2014) presents to the ED with acute onset chest pain for one day. Also with SOB. Endocrine consulted for T2DM management.    Poorly controlled T2DM with hyperglycemia  A1c 10.1%  -Continue Lantus 25 units at bedtime. DO NOT HOLD IF NPO.  -Change Admelog to 15/12/10 units pre-meal. HOLD IF NPO.  -Use moderate Admelog correction scale pre-meal  -Use moderate Admelog correction scale at bedtime  -Fingerstick BG before meals and bedtime  -Goal -180  -Carbohydrate consistent diet  -RD consult  Discharge plan:  -Likely to discharge patient home on basal/bolus insulin. Will encourage patient to start GLP-1 agonist though he seems hesitant. Final regimen pending clinical course.  -Recommend routine outpatient ophthalmology, podiatry and endocrinology f/u. Can f/u with his home Endocrinologist.    HTN  -Outpatient goal BP <130/80. Management per primary team.    HLD  -On atorvastatin 40mg daily    Wade Hall DO, Endocrinology Fellow  Pager [phone] from 9am to 5pm. After hours and on weekends, please call [phone]. 67M with PMH of CAD, NSTEMI s/p 3 stents in 2014 (stents to LAD, proximal and distal LCx), ischemic cardiomyopathy, HTN, T2DM c/b peripheral neuropathy, CVA, TIA, Afib on Pradaxa, chronic RLE wound, L carotid stenosis s/p endarterectomy (2014) presents to the ED with acute onset chest pain for one day. Also with SOB. Endocrine consulted for T2DM management.    Poorly controlled T2DM with hyperglycemia  A1c 10.1%  -Continue Lantus 25 units at bedtime. DO NOT HOLD IF NPO.  -Change Admelog to 15/12/10 units pre-meal. HOLD IF NPO.  -Use moderate Admelog correction scale pre-meal  -Use moderate Admelog correction scale at bedtime  -Fingerstick BG before meals and bedtime  -Goal -180  -Carbohydrate consistent diet  -RD consult  Discharge plan:  -Likely to discharge patient home on basal/bolus insulin. Will encourage patient to start GLP-1 agonist though he seems hesitant. Final regimen pending clinical course.  Patient already has script filled for Trulicity 0.75 mg SQ weekly at home, reviewed benefits and he now agrees to start med.  -Recommend routine outpatient ophthalmology, podiatry and endocrinology f/u. Can f/u with his home Endocrinologist.    HTN  -Outpatient goal BP <130/80. Management per primary team.    HLD  -On atorvastatin 40mg daily    Wade Hall DO, Endocrinology Fellow  Pager [phone] from 9am to 5pm. After hours and on weekends, please call [phone].

## 2022-02-20 NOTE — PROVIDER CONTACT NOTE (CRITICAL VALUE NOTIFICATION) - RECOMMENDATIONS
Follow full anticoagulation nomogram; pause hep gtt for 1 hr and restart at 20 cc/hr (from 24 cc/hr)

## 2022-02-21 NOTE — PROGRESS NOTE ADULT - PROBLEM SELECTOR PLAN 2
-Elevated troponin  -Plavix, asa, heparin gtt per cards   -F/u TTE  -Plans for cath once creatinine stable -NSTEMI, CAD, Elevated troponin  -Plavix, asa, heparin gtt per cards   -F/u TTE  -Plans for cath once creatinine stable as per cards

## 2022-02-21 NOTE — PROGRESS NOTE ADULT - ASSESSMENT
68 y/o M with PMH of pleural effusion 2019 - s/p R thoracentesis then R chest tube placement with course c/b R hydroPTX - tx to LI for possible VATS decortication which was deferred, CAD, NSTEMI s/p 3 stents in 2014 (stents to LAD, proximal and distal LCx), ischemic cardiomyopathy, HTN, T2DM c/b peripheral neuropathy, CVA, TIA, Afib on Pradaxa, chronic RLE wound, L carotid stenosis s/p endarterectomy (2014). Presents to ED with acute onset chest pain, SOB for one day. Labs notable for elevated troponin.

## 2022-02-21 NOTE — CHART NOTE - NSCHARTNOTEFT_GEN_A_CORE
67M with PMH of CAD, NSTEMI s/p 3 stents in 2014 (stents to LAD, proximal and distal LCx), ischemic cardiomyopathy, HTN, T2DM c/b peripheral neuropathy, CVA, TIA, Afib on Pradaxa, chronic RLE wound, L carotid stenosis s/p endarterectomy (2014) presents to the ED with acute onset chest pain for one day. Also with SOB. Endocrine consulted for T2DM management.     POC glucose, insulin requirements, lab values reviewed. noted Lunch BG remains 300s above goal . increase breakfast admelog . Cr 3. Plan for cath when Cr stable per chart review     Poorly controlled T2DM with hyperglycemia  A1c 10.1%  -Continue Lantus 25 units at bedtime. DO NOT HOLD IF NPO.  -Change Admelog to 18/12/10 units pre-meal. HOLD IF NPO.  -Use moderate Admelog correction scale pre-meal  -Use moderate Admelog correction scale at bedtime  -Fingerstick BG before meals and bedtime  -Goal -180  -Carbohydrate consistent diet  -RD consult

## 2022-02-21 NOTE — PROGRESS NOTE ADULT - SUBJECTIVE AND OBJECTIVE BOX
DATE OF SERVICE: 02-21-22 @ 12:21  CHIEF COMPLAINT:Patient is a 67y old  Male who presents with a chief complaint of chest pain (21 Feb 2022 12:11)    	        PAST MEDICAL & SURGICAL HISTORY:  HTN (hypertension), benign    HLD (hyperlipidemia)    DM type 2 (diabetes mellitus, type 2)    TIA (transient ischemic attack)    Atrial fibrillation    MI (myocardial infarction)  circa 2014    CAD (coronary artery disease)    Neuropathy    Status post angioplasty with stent  LULU x 3 2/7/2014    S/P carotid endarterectomy  left            REVIEW OF SYSTEMS:    RESPIRATORY: some sob at atimes but better  CARDIOVASCULAR: No chest pain, palpitations, passing out, dizziness  GASTROINTESTINAL: No abdominal or epigastric pain. No nausea, vomiting, or hematemesis; No diarrhea or constipation. a.  GENITOURINARY: No dysuria, frequency, hematuria,   NEUROLOGICAL: No headaches, memory loss, loss of strength, numbness, or tremors  SKIN: No itching, burning, rashes, or lesions     MUSCULOSKELETAL: No joint pain or swelling; No muscle, back, or extremity pain    Medications:  MEDICATIONS  (STANDING):  allopurinol 100 milliGRAM(s) Oral daily  aspirin enteric coated 81 milliGRAM(s) Oral daily  atorvastatin 40 milliGRAM(s) Oral at bedtime  clopidogrel Tablet 75 milliGRAM(s) Oral daily  dextrose 40% Gel 15 Gram(s) Oral once  dextrose 5%. 1000 milliLiter(s) (50 mL/Hr) IV Continuous <Continuous>  dextrose 5%. 1000 milliLiter(s) (100 mL/Hr) IV Continuous <Continuous>  dextrose 50% Injectable 25 Gram(s) IV Push once  dextrose 50% Injectable 12.5 Gram(s) IV Push once  dextrose 50% Injectable 25 Gram(s) IV Push once  diltiazem    milliGRAM(s) Oral at bedtime  glucagon  Injectable 1 milliGRAM(s) IntraMuscular once  heparin  Infusion.  Unit(s)/Hr (24 mL/Hr) IV Continuous <Continuous>  insulin glargine Injectable (LANTUS) 25 Unit(s) SubCutaneous at bedtime  insulin lispro (ADMELOG) corrective regimen sliding scale   SubCutaneous three times a day before meals  insulin lispro (ADMELOG) corrective regimen sliding scale   SubCutaneous at bedtime  insulin lispro Injectable (ADMELOG) 15 Unit(s) SubCutaneous before breakfast  insulin lispro Injectable (ADMELOG) 12 Unit(s) SubCutaneous before lunch  insulin lispro Injectable (ADMELOG) 10 Unit(s) SubCutaneous before dinner  metoprolol succinate  milliGRAM(s) Oral daily  pregabalin 100 milliGRAM(s) Oral three times a day    MEDICATIONS  (PRN):  heparin   Injectable 01757 Unit(s) IV Push every 6 hours PRN For aPTT less than 40  heparin   Injectable 5000 Unit(s) IV Push every 6 hours PRN For aPTT between 40 - 57  oxycodone    5 mG/acetaminophen 325 mG 1 Tablet(s) Oral every 12 hours PRN Severe Pain (7 - 10)    	    PHYSICAL EXAM:  T(C): 36.3 (02-21-22 @ 06:04), Max: 36.7 (02-20-22 @ 21:33)  HR: 71 (02-21-22 @ 06:04) (69 - 77)  BP: 126/65 (02-21-22 @ 06:04) (112/75 - 129/71)  RR: 18 (02-21-22 @ 06:04) (17 - 18)  SpO2: 94% (02-21-22 @ 06:04) (94% - 98%)  Wt(kg): --  I&O's Summary    20 Feb 2022 07:01  -  21 Feb 2022 07:00  --------------------------------------------------------  IN: 1494 mL / OUT: 2025 mL / NET: -531 mL    21 Feb 2022 07:01  -  21 Feb 2022 12:21  --------------------------------------------------------  IN: 240 mL / OUT: 400 mL / NET: -160 mL        l	  HEENT:   Normal oral mucosa, PERRL, EOMI	    Cardiovascular: Normal S1 S2, No JVD,  Respiratory: Lungs clear to auscultation	  Psychiatry: A & O  Gastrointestinal:  Soft, Non-tender, + BS	    Neurologic: Non-focal  Extremities: dec rom   pvd  edema  TELEMETRY: 	    ECG:  	  RADIOLOGY:  OTHER: 	  	  LABS:	 	    CARDIAC MARKERS:  CARDIAC MARKERS ( 20 Feb 2022 12:23 )  x     / x     / 88 U/L / x     / 8.1 ng/mL  CARDIAC MARKERS ( 20 Feb 2022 02:09 )  x     / x     / 123 U/L / x     / x      CARDIAC MARKERS ( 19 Feb 2022 16:39 )  x     / x     / 154 U/L / x     / x                                    13.9   10.12 )-----------( 211      ( 21 Feb 2022 06:20 )             46.1     02-21    136  |  94<L>  |  84<H>  ----------------------------<  189<H>  3.5   |  28  |  3.00<H>    Ca    9.3      21 Feb 2022 06:21  Phos  5.2     02-21  Mg     2.2     02-21      proBNP:   Lipid Profile:   HgA1c:   TSH:

## 2022-02-21 NOTE — PROGRESS NOTE ADULT - ASSESSMENT
Echo 10/17/19: EF 45%, Grossly borderline left ventricular systolic dysfunction.   ** Compared with echocardiogram of 12/29/2018, no significant changes noted (reported LVEF in last study is slightly lower, but atrial fibrillation makes it difficult to assess ejection fraction).    a/p  68 y/o male with a past medical history of HTN, DMT2 c/b LLE neuropathy, CVA/TIA, Afib (on Pradaxa , MI, CAD (NSTEMI) x 3 stents in 2014 to LAD, and proximal and distal LCx, ischemic cardiomyopathy, chronic unhealing RLE wound s/p angio, left CEA (2014), CKD presents with acute onset chest pain, pleuritic and inc dyspnea    #Chest pain, NSTEMI, CAD, s/p PCI  -in setting of increased AF rates off bb for 3 days  -resolved chest pain  -CKMB peaked  -c/w toprol, asa, plavix   -cont IV HEP  -pending echo   -plan for cath TUES if creat stable    #R carotid stenosis  -cont med tx  -MRA noted with Greater than 75% stenosis of the right distal common carotid artery. Approximately 60% stenosis of the proximal left internal carotid artery.  -Continue ASA and Statin Therapy    #Ischemic cardiomyopathy, chronic systolic/diastolic HF  -BB, ASA, Statin  -IV bumex/ po metolazone on hold given creat rising   -pending echo     #HTN  -stable, cont home meds  -off ACE due to hyperkalemia hx    #alex on ckd  -diuretics on hold  -renal following     #Afib  -rates stable  -c/w CCB and BB  -pradaxa on hold-- cont Hep gtt     -Advanced care planning discussed with patient. Advanced care planning forms discussed with patient and/or family.  Risks, benefits, and alternatives of medical/cardiac procedures were discussed in detail with all questions answered.  30 minutes were spent addressing advance care planning.      d/w floor acp

## 2022-02-21 NOTE — PROGRESS NOTE ADULT - SUBJECTIVE AND OBJECTIVE BOX
[facility]   [phone]  RENAL FOLLOW UP NOTE  --------------------------------------------------------------------------------  Chief Complaint:    24 hour events/subjective:    seen earlier   states has no cp today     PAST HISTORY  --------------------------------------------------------------------------------  No significant changes to PMH, PSH, FHx, SHx, unless otherwise noted    ALLERGIES & MEDICATIONS  --------------------------------------------------------------------------------  Allergies    No Known Allergies    Intolerances      Standing Inpatient Medications  allopurinol 100 milliGRAM(s) Oral daily  aspirin enteric coated 81 milliGRAM(s) Oral daily  atorvastatin 40 milliGRAM(s) Oral at bedtime  clopidogrel Tablet 75 milliGRAM(s) Oral daily  dextrose 40% Gel 15 Gram(s) Oral once  dextrose 5%. 1000 milliLiter(s) IV Continuous <Continuous>  dextrose 5%. 1000 milliLiter(s) IV Continuous <Continuous>  dextrose 50% Injectable 25 Gram(s) IV Push once  dextrose 50% Injectable 12.5 Gram(s) IV Push once  dextrose 50% Injectable 25 Gram(s) IV Push once  diltiazem    milliGRAM(s) Oral at bedtime  glucagon  Injectable 1 milliGRAM(s) IntraMuscular once  heparin  Infusion.  Unit(s)/Hr IV Continuous <Continuous>  insulin glargine Injectable (LANTUS) 25 Unit(s) SubCutaneous at bedtime  insulin lispro (ADMELOG) corrective regimen sliding scale   SubCutaneous three times a day before meals  insulin lispro (ADMELOG) corrective regimen sliding scale   SubCutaneous at bedtime  insulin lispro Injectable (ADMELOG) 12 Unit(s) SubCutaneous before lunch  insulin lispro Injectable (ADMELOG) 10 Unit(s) SubCutaneous before dinner  metoprolol succinate  milliGRAM(s) Oral daily  pregabalin 100 milliGRAM(s) Oral three times a day    PRN Inpatient Medications  heparin   Injectable 10686 Unit(s) IV Push every 6 hours PRN  heparin   Injectable 5000 Unit(s) IV Push every 6 hours PRN  oxycodone    5 mG/acetaminophen 325 mG 1 Tablet(s) Oral every 12 hours PRN      REVIEW OF SYSTEMS  --------------------------------------------------------------------------------    Gen: denies  fevers/chills,  CVS: denies chest pain/palpitations  Resp: denies SOB/Cough  GI: Denies N/V/Abd pain  : Denies dysuria      VITALS/PHYSICAL EXAM  --------------------------------------------------------------------------------  T(C): 36.6 (02-21-22 @ 22:03), Max: 36.6 (02-21-22 @ 13:53)  HR: 81 (02-21-22 @ 22:03) (69 - 81)  BP: 147/69 (02-21-22 @ 22:03) (126/65 - 154/72)  RR: 18 (02-21-22 @ 22:03) (18 - 18)  SpO2: 97% (02-21-22 @ 22:03) (94% - 98%)  Wt(kg): --        02-20-22 @ 07:01  -  02-21-22 @ 07:00  --------------------------------------------------------  IN: 1494 mL / OUT: 2025 mL / NET: -531 mL    02-21-22 @ 07:01  -  02-21-22 @ 22:15  --------------------------------------------------------  IN: 820 mL / OUT: 1450 mL / NET: -630 mL      Physical Exam:    alert and oriented  no jvd  heart s1/s2/ RRR no rub  lungs decrease bs no rales or ronchi  abd obese soft non tender  ext + amanda b/l LE with abrasion right mid leg appears c/d       LABS/STUDIES  --------------------------------------------------------------------------------              13.9   10.12 >-----------<  211      [02-21-22 @ 06:20]              46.1     136  |  94  |  84  ----------------------------<  189      [02-21-22 @ 06:21]  3.5   |  28  |  3.00        Ca     9.3     [02-21-22 @ 06:21]      Mg     2.2     [02-21-22 @ 06:21]      Phos  5.2     [02-21-22 @ 06:21]        PTT: 96.9       [02-21-22 @ 06:20]    CK 88      [02-20-22 @ 12:23]    Creatinine Trend:  SCr 3.00 [02-21 @ 06:21]  SCr 2.89 [02-20 @ 12:23]  SCr 2.11 [02-19 @ 07:17]  SCr 2.05 [02-18 @ 17:23]              Urinalysis - [02-19-22 @ 23:07]      Color Light Yellow / Appearance Clear / SG 1.012 / pH 6.0      Gluc Negative / Ketone Negative  / Bili Negative / Urobili Negative       Blood Negative / Protein 30 mg/dL / Leuk Est Negative / Nitrite Negative      RBC 2 / WBC 1 / Hyaline 12 / Gran  / Sq Epi  / Non Sq Epi 1 / Bacteria Negative    Urine Creatinine 73      [02-19-22 @ 23:08]  Urine Protein 61      [02-19-22 @ 23:08]  Urine Sodium 55      [02-19-22 @ 23:08]  Urine Urea Nitrogen 177      [02-20-22 @ 04:44]  Urine Chloride 64      [02-19-22 @ 23:08]    HbA1c 11.7      [11-07-19 @ 09:14]

## 2022-02-21 NOTE — PROGRESS NOTE ADULT - PROBLEM SELECTOR PLAN 1
suspect cardiac in origin given elevated trops and LE edema  -Endorses compliance with Pradaxa as an outpt  -LE edema, Keep O>I as tolerated. Edema improving.   -Pt with hx of hydroPTX. CT chest non contrast with pleural thickening, ?atelectasis LL. Will require repeat CT chest in 3 months. Findings appear chronic, doubt this is the cause of his acute SOB.   -F/u LE duplex   -Normoxic, keep sats >92% with supplemental O2 PRN. suspect cardiac in origin given elevated trops and LE edema  -Endorses compliance with Pradaxa as an outpt  -LE edema, Keep O>I as tolerated. Edema improving.   -Pt with hx of hydroPTX. CT chest non contrast with pleural thickening, atelectasis LLL. Will require repeat CT chest in 1-3 months. Findings appear chronic, doubt this is the cause of his acute SOB.   -F/u LE duplex   -Normoxic, keep sats >92% with supplemental O2 PRN.

## 2022-02-21 NOTE — PROGRESS NOTE ADULT - ASSESSMENT
67M with PMH of CAD, NSTEMI s/p 3 stents in 2014 (stents to LAD, proximal and distal LCx), ischemic cardiomyopathy, HTN, T2DM c/b peripheral neuropathy, CVA, TIA, Afib on Pradaxa, chronic RLE wound, L carotid stenosis s/p endarterectomy (2014) presents to the ED with acute onset chest pain and sob     Problem/Plan - 1:  ·  Problem: Chest pain.   ·  Plan: Patient presents with atypical chest pain, possibly 2/2 Afib. Chest pain now resolved. Troponin elevated   - cardiology recs appreciated  c/w meds    - f/u TTE to assess cardiac function  - ischemic eval per cardiology./cath>likely      Problem/Plan - 2:  ·  Problem: Cardiac arrhythmia.  ·  Plan: Patient had a run of VT on tele however asymptomatic  - currently Afib on tele  -c/w meds     Problem/Plan - 3:  ·  Problem: CAD (coronary artery disease).  ·  Plan: Patient has a history of CAD s/p 3 stents  c/w meds  chf hx       ·  Problem: Carotid stenosis.  ·  Plan: Patient has a history of carotic stenosis  - MRA showed greater than 75% stenosis of the R distal common carotic a, 60% stenosis of the prximal L internal carotid a  - continue aspirin qd  - continue statin qd.     Problem/Plan - 6:  ·  Problem: Cardiomyopathy, ischemic.  ·  Plan: Patient has a history of CHF  - continue diuresis with bumex, metolazone  - strict Is&Os  - daily weights  - continue aspirin, beta blocker, statin.      ·  Problem: Chronic kidney disease, unspecified CKD stage  renal f/u  - continue to monitor  - strict Is&Os.    ·  Problem: Afib.  ·  Plan: - continue pradaxa BID  - continue metoprolol 100mg qd.      ·  Problem: Hypertension.c/w meds  - monitor blood pressures closely.      ·  Problem: T2DM (type 2 diabetes mellitus).c/w tx  - monitor blood glucose closely in the setting of CKD.    ·  Problem: Preventive measure.   ·  Plan: - DVT ppx: Pradaxa  - Diet: DASH/CC    wound care for foot

## 2022-02-21 NOTE — PROGRESS NOTE ADULT - ASSESSMENT
Mr. Stevens is an 67 year old gentleman with PMH of CAD, NSTEMI s/p 3 stents in 2014 (stents to LAD, proximal and distal LCx), ischemic cardiomyopathy, HTN, T2DM c/b peripheral neuropathy, CVA, TIA, Afib on Pradaxa, chronic RLE wound, L carotid stenosis s/p endarterectomy (2014) presents to the ED with acute onset chest pain and sob.     1. Now ANT on CKD  2.  CHF    3. Electrolytes are acceptable.   4. Elevated bicarb in the setting of possible CO2 retention.   5. Cardiomyopathy defer to primary  6. Proteinuria   7- htn hx     ant in setting of requiring diuretics for chf  now being held.   cr is high. given ant hold angio until cr is at baseline range unless angiogram emergently needed   to have gentle ivf hydration atiya procedure   cont cardizem cd 120 mg daily

## 2022-02-21 NOTE — PROGRESS NOTE ADULT - SUBJECTIVE AND OBJECTIVE BOX
CARDIOLOGY FOLLOW UP - Dr. Mazariegos  Date of Service: 2/21/22  CC: c/o mild dyspnea this am  no cp     Review of Systems:  Constitutional: No fever, weight loss, or fatigue  Respiratory: No cough, wheezing, or hemoptysis, no shortness of breath  Cardiovascular: No chest pain, palpitations, passing out, dizziness, or leg swelling  Gastrointestinal: No abd or epigastric pain.  No nausea, vomiting, or hematemesis; no diarrhea or constipation, no melena or hematochezia  Vascular: no edema       PHYSICAL EXAM:  T(C): 36.3 (02-21-22 @ 06:04), Max: 36.7 (02-20-22 @ 21:33)  HR: 71 (02-21-22 @ 06:04) (69 - 77)  BP: 126/65 (02-21-22 @ 06:04) (112/75 - 129/71)  RR: 18 (02-21-22 @ 06:04) (17 - 18)  SpO2: 94% (02-21-22 @ 06:04) (94% - 98%)  Wt(kg): --  I&O's Summary    20 Feb 2022 07:01  -  21 Feb 2022 07:00  --------------------------------------------------------  IN: 1494 mL / OUT: 2025 mL / NET: -531 mL    21 Feb 2022 07:01  -  21 Feb 2022 12:11  --------------------------------------------------------  IN: 240 mL / OUT: 400 mL / NET: -160 mL        Appearance: Normal	  Cardiovascular: Normal S1 S2,RRR, No JVD, No murmurs  Respiratory: Lungs clear to auscultation	  Gastrointestinal:  Soft, Non-tender, + BS	  Extremities: Normal range of motion, No clubbing, cyanosis or edema      Home Medications:  acetaminophen 325 mg oral tablet: 2 tab(s) orally every 6 hours, As needed, Temp greater or equal to 38C (100.4F), Mild Pain (1 - 3) (18 Feb 2022 21:56)  allopurinol 100 mg oral tablet: 1 tab(s) orally once a day (18 Feb 2022 21:56)  aspirin 81 mg oral delayed release tablet: 1 tab(s) orally once a day (18 Feb 2022 21:56)  bumetanide 2 mg oral tablet: 1 tab(s) orally once a day (18 Feb 2022 21:56)  insulin glargine 100 units/mL subcutaneous solution: 25 unit(s) subcutaneous once a day (18 Feb 2022 21:56)  metOLazone 5 mg oral tablet: 1 tab(s) orally 3 times a week (18 Feb 2022 21:56)  metoprolol succinate 50 mg oral tablet, extended release: 2 tab(s) orally once a day (18 Feb 2022 21:56)  NovoLOG FlexPen 100 units/mL injectable solution: 10 unit(s) subcutaneous 3 times a day (18 Feb 2022 21:56)  oxycodone-acetaminophen 5 mg-325 mg oral tablet: 1 tab(s) orally 2 times a day (18 Feb 2022 21:56)  Pradaxa 150 mg oral capsule: 1 cap(s) orally 2 times a day (18 Feb 2022 21:56)  pregabalin 100 mg oral capsule: 1 cap(s) orally 3 times a day (18 Feb 2022 21:56)      MEDICATIONS  (STANDING):  allopurinol 100 milliGRAM(s) Oral daily  aspirin enteric coated 81 milliGRAM(s) Oral daily  atorvastatin 40 milliGRAM(s) Oral at bedtime  clopidogrel Tablet 75 milliGRAM(s) Oral daily  dextrose 40% Gel 15 Gram(s) Oral once  dextrose 5%. 1000 milliLiter(s) (50 mL/Hr) IV Continuous <Continuous>  dextrose 5%. 1000 milliLiter(s) (100 mL/Hr) IV Continuous <Continuous>  dextrose 50% Injectable 25 Gram(s) IV Push once  dextrose 50% Injectable 12.5 Gram(s) IV Push once  dextrose 50% Injectable 25 Gram(s) IV Push once  diltiazem    milliGRAM(s) Oral at bedtime  glucagon  Injectable 1 milliGRAM(s) IntraMuscular once  heparin  Infusion.  Unit(s)/Hr (24 mL/Hr) IV Continuous <Continuous>  insulin glargine Injectable (LANTUS) 25 Unit(s) SubCutaneous at bedtime  insulin lispro (ADMELOG) corrective regimen sliding scale   SubCutaneous three times a day before meals  insulin lispro (ADMELOG) corrective regimen sliding scale   SubCutaneous at bedtime  insulin lispro Injectable (ADMELOG) 15 Unit(s) SubCutaneous before breakfast  insulin lispro Injectable (ADMELOG) 12 Unit(s) SubCutaneous before lunch  insulin lispro Injectable (ADMELOG) 10 Unit(s) SubCutaneous before dinner  metoprolol succinate  milliGRAM(s) Oral daily  pregabalin 100 milliGRAM(s) Oral three times a day      TELEMETRY: 	    ECG:  	  RADIOLOGY:   DIAGNOSTIC TESTING:  [ ] Echocardiogram:  [ ]  Catheterization:  [ ] Stress Test:    OTHER: 	    LABS:	 	    Troponin T, High Sensitivity Result: 2016 ng/L [0 - 51] (02-20 @ 12:23)  Creatine Kinase, Serum: 88 U/L [30 - 200] (02-20 @ 12:23)  CKMB Units: 8.1 ng/mL [0.0 - 6.7] (02-20 @ 12:23)  Creatine Kinase, Serum: 123 U/L [30 - 200] (02-20 @ 02:09)  Creatine Kinase, Serum: 154 U/L [30 - 200] (02-19 @ 16:39)  Troponin T, High Sensitivity Result: 1192 ng/L [0 - 51] (02-19 @ 16:39)  Creatine Kinase, Serum: 226 U/L [30 - 200] (02-19 @ 07:17)  CKMB Units: 23.2 ng/mL [0.0 - 6.7] (02-19 @ 07:17)  Troponin T, High Sensitivity Result: 1127 ng/L [0 - 51] (02-19 @ 07:17)  Creatine Kinase, Serum: 270 U/L [30 - 200] (02-19 @ 00:12)  CKMB Units: 30.3 ng/mL [0.0 - 6.7] (02-19 @ 00:12)  Troponin T, High Sensitivity Result: 971 ng/L [0 - 51] (02-19 @ 00:12)  Troponin T, High Sensitivity Result: 932 ng/L [0 - 51] (02-18 @ 17:23)                          13.9   10.12 )-----------( 211      ( 21 Feb 2022 06:20 )             46.1     02-21    136  |  94<L>  |  84<H>  ----------------------------<  189<H>  3.5   |  28  |  3.00<H>    Ca    9.3      21 Feb 2022 06:21  Phos  5.2     02-21  Mg     2.2     02-21      PTT - ( 21 Feb 2022 06:20 )  PTT:96.9 sec

## 2022-02-22 NOTE — PROGRESS NOTE ADULT - SUBJECTIVE AND OBJECTIVE BOX
[facility]   [phone]  RENAL FOLLOW UP NOTE  --------------------------------------------------------------------------------  Chief Complaint:    24 hour events/subjective:    seen earlier   denies cp or decrease urination     PAST HISTORY  --------------------------------------------------------------------------------  No significant changes to PMH, PSH, FHx, SHx, unless otherwise noted    ALLERGIES & MEDICATIONS  --------------------------------------------------------------------------------  Allergies    No Known Allergies    Intolerances      Standing Inpatient Medications  allopurinol 100 milliGRAM(s) Oral daily  aspirin enteric coated 81 milliGRAM(s) Oral daily  atorvastatin 40 milliGRAM(s) Oral at bedtime  clopidogrel Tablet 75 milliGRAM(s) Oral daily  dextrose 40% Gel 15 Gram(s) Oral once  dextrose 5%. 1000 milliLiter(s) IV Continuous <Continuous>  dextrose 5%. 1000 milliLiter(s) IV Continuous <Continuous>  dextrose 50% Injectable 25 Gram(s) IV Push once  dextrose 50% Injectable 12.5 Gram(s) IV Push once  dextrose 50% Injectable 25 Gram(s) IV Push once  diltiazem    milliGRAM(s) Oral at bedtime  glucagon  Injectable 1 milliGRAM(s) IntraMuscular once  heparin  Infusion.  Unit(s)/Hr IV Continuous <Continuous>  insulin glargine Injectable (LANTUS) 25 Unit(s) SubCutaneous at bedtime  insulin lispro (ADMELOG) corrective regimen sliding scale   SubCutaneous three times a day before meals  insulin lispro (ADMELOG) corrective regimen sliding scale   SubCutaneous at bedtime  insulin lispro Injectable (ADMELOG) 12 Unit(s) SubCutaneous before lunch  insulin lispro Injectable (ADMELOG) 10 Unit(s) SubCutaneous before dinner  metoprolol succinate  milliGRAM(s) Oral daily  pregabalin 100 milliGRAM(s) Oral three times a day    PRN Inpatient Medications  heparin   Injectable 47052 Unit(s) IV Push every 6 hours PRN  heparin   Injectable 5000 Unit(s) IV Push every 6 hours PRN  oxycodone    5 mG/acetaminophen 325 mG 1 Tablet(s) Oral every 12 hours PRN      REVIEW OF SYSTEMS  --------------------------------------------------------------------------------    Gen: denies  fevers/chills,  CVS: denies chest pain/palpitations  Resp: denies SOB/Cough  GI: Denies N/V/Abd pain  : Denies dysuria        VITALS/PHYSICAL EXAM  --------------------------------------------------------------------------------  T(C): 37.6 (02-22-22 @ 20:55), Max: 37.6 (02-22-22 @ 20:55)  HR: 74 (02-22-22 @ 20:55) (63 - 84)  BP: 151/74 (02-22-22 @ 20:55) (143/81 - 163/71)  RR: 18 (02-22-22 @ 20:55) (17 - 18)  SpO2: 97% (02-22-22 @ 20:55) (94% - 98%)  Wt(kg): --        02-21-22 @ 07:01  -  02-22-22 @ 07:00  --------------------------------------------------------  IN: 1327 mL / OUT: 2500 mL / NET: -1173 mL    02-22-22 @ 07:01  -  02-22-22 @ 22:36  --------------------------------------------------------  IN: 1284 mL / OUT: 2100 mL / NET: -816 mL      Physical Exam:  	  alert and oriented  no jvd  heart s1/s2/ RRR no rub  lungs decrease bs no rales or ronchi  abd obese soft non tender  ext + amanda b/l LE with abrasion right mid leg appears c/d       LABS/STUDIES  --------------------------------------------------------------------------------              13.8   9.30  >-----------<  182      [02-22-22 @ 07:23]              45.2     132  |  93  |  79  ----------------------------<  190      [02-22-22 @ 07:24]  3.6   |  26  |  2.42        Ca     9.2     [02-22-22 @ 07:24]      Mg     2.2     [02-21-22 @ 06:21]      Phos  5.2     [02-21-22 @ 06:21]        PTT: 69.6       [02-22-22 @ 07:25]      Creatinine Trend:  SCr 2.42 [02-22 @ 07:24]  SCr 3.00 [02-21 @ 06:21]  SCr 2.89 [02-20 @ 12:23]  SCr 2.11 [02-19 @ 07:17]  SCr 2.05 [02-18 @ 17:23]              Urinalysis - [02-19-22 @ 23:07]      Color Light Yellow / Appearance Clear / SG 1.012 / pH 6.0      Gluc Negative / Ketone Negative  / Bili Negative / Urobili Negative       Blood Negative / Protein 30 mg/dL / Leuk Est Negative / Nitrite Negative      RBC 2 / WBC 1 / Hyaline 12 / Gran  / Sq Epi  / Non Sq Epi 1 / Bacteria Negative    Urine Creatinine 73      [02-19-22 @ 23:08]  Urine Protein 61      [02-19-22 @ 23:08]  Urine Sodium 55      [02-19-22 @ 23:08]  Urine Urea Nitrogen 177      [02-20-22 @ 04:44]  Urine Chloride 64      [02-19-22 @ 23:08]    HbA1c 11.7      [11-07-19 @ 09:14]

## 2022-02-22 NOTE — PROGRESS NOTE ADULT - SUBJECTIVE AND OBJECTIVE BOX
Follow-up Pulm Progress Note    No new respiratory events overnight.  Denies SOB/CP.     Medications:  MEDICATIONS  (STANDING):  allopurinol 100 milliGRAM(s) Oral daily  aspirin enteric coated 81 milliGRAM(s) Oral daily  atorvastatin 40 milliGRAM(s) Oral at bedtime  clopidogrel Tablet 75 milliGRAM(s) Oral daily  dextrose 40% Gel 15 Gram(s) Oral once  dextrose 5%. 1000 milliLiter(s) (50 mL/Hr) IV Continuous <Continuous>  dextrose 5%. 1000 milliLiter(s) (100 mL/Hr) IV Continuous <Continuous>  dextrose 50% Injectable 25 Gram(s) IV Push once  dextrose 50% Injectable 12.5 Gram(s) IV Push once  dextrose 50% Injectable 25 Gram(s) IV Push once  diltiazem    milliGRAM(s) Oral at bedtime  glucagon  Injectable 1 milliGRAM(s) IntraMuscular once  heparin  Infusion.  Unit(s)/Hr (24 mL/Hr) IV Continuous <Continuous>  insulin glargine Injectable (LANTUS) 25 Unit(s) SubCutaneous at bedtime  insulin lispro (ADMELOG) corrective regimen sliding scale   SubCutaneous three times a day before meals  insulin lispro (ADMELOG) corrective regimen sliding scale   SubCutaneous at bedtime  insulin lispro Injectable (ADMELOG) 18 Unit(s) SubCutaneous before breakfast  insulin lispro Injectable (ADMELOG) 12 Unit(s) SubCutaneous before lunch  insulin lispro Injectable (ADMELOG) 10 Unit(s) SubCutaneous before dinner  metoprolol succinate  milliGRAM(s) Oral daily  pregabalin 100 milliGRAM(s) Oral three times a day    MEDICATIONS  (PRN):  heparin   Injectable 25421 Unit(s) IV Push every 6 hours PRN For aPTT less than 40  heparin   Injectable 5000 Unit(s) IV Push every 6 hours PRN For aPTT between 40 - 57  oxycodone    5 mG/acetaminophen 325 mG 1 Tablet(s) Oral every 12 hours PRN Severe Pain (7 - 10)          Vital Signs Last 24 Hrs  T(C): 36.3 (22 Feb 2022 07:59), Max: 36.6 (21 Feb 2022 13:53)  T(F): 97.3 (22 Feb 2022 07:59), Max: 97.9 (21 Feb 2022 13:53)  HR: 63 (22 Feb 2022 07:59) (63 - 81)  BP: 163/71 (22 Feb 2022 07:59) (147/69 - 163/71)  BP(mean): --  RR: 17 (22 Feb 2022 07:59) (17 - 18)  SpO2: 96% (22 Feb 2022 07:59) (94% - 98%)          02-21 @ 07:01  -  02-22 @ 07:00  --------------------------------------------------------  IN: 1327 mL / OUT: 2500 mL / NET: -1173 mL          LABS:                        13.8   9.30  )-----------( 182      ( 22 Feb 2022 07:23 )             45.2     02-22    132<L>  |  93<L>  |  79<H>  ----------------------------<  190<H>  3.6   |  26  |  2.42<H>    Ca    9.2      22 Feb 2022 07:24  Phos  5.2     02-21  Mg     2.2     02-21            CAPILLARY BLOOD GLUCOSE      POCT Blood Glucose.: 221 mg/dL (22 Feb 2022 12:41)    PTT - ( 22 Feb 2022 07:25 )  PTT:69.6 sec                Physical Examination:  PULM: Clear to auscultation bilaterally, no significant sputum production  CVS: S1, S2 heard        RADIOLOGY REVIEWED    CT chest: < from: CT Chest No Cont (02.19.22 @ 16:45) >  FINDINGS:    AIRWAYS, LUNGS and PLEURA: Patent central airways. Severe bibasilar   pleural thickening and rounded atelectasis within basilar lower lobes.   Atelectasis of inferior segment of lingula. Scattered small calcified   granulomas. Trace left pleural effusion. No pneumothorax.    MEDIASTINUM AND STEPHON: Numerous subcentimeter mediastinal lymph nodes are   unchanged.    HEART AND VESSELS: Cardiomegaly. Coronary and aortic calcifications. No   pericardial effusion. Thoracic aorta normal in diameter. Dilated   pulmonary arteries suggestive of pulmonary hypertension.    VISUALIZED UPPER ABDOMEN: Bilateral perinephric fat stranding is   unchanged. Aortic calcification.    CHEST WALL AND BONES: No aggressive osseous lesion.    LOWER NECK: Within normal limits.    IMPRESSION:    Severe bibasilar pleural thickening and rounded atelectasis within   basilar lower lobes. Atelectasis of inferior segment of lingula. Trace   left pleural effusion. No pneumothorax.    < end of copied text >

## 2022-02-22 NOTE — PROGRESS NOTE ADULT - ASSESSMENT
67 year old man with CAD, hx of NSTEMI s/p 3 stents in 2014 (stents to LAD, proximal and distal LCx), ischemic cardiomyopathy, HTN, T2DM c/b peripheral neuropathy, CVA, TIA, Afib on Pradaxa, chronic RLE wound, L carotid stenosis s/p endarterectomy (2014) admitted with acute onset chest pain for one day. Also with SOB. Endocrine consulted for T2DM management.    Uncontrolled T2DM with hyperglycemia  A1c 10.1%  Home regimen: basaglar 25, novolog 10   - Pt with improved glucose control, still with postprandial hyperglycemia after breakfast  - Increase pre-breakfast admelog to 20 units. Reviewed with pt glycemic targets  - Continue with admelog 12 units before lunch and 10 units before dinner  -Continue Lantus 25 units at bedtime.   (If pt will be NPO for procedure then give 20 units)  - Continue moderate Admelog correction scales  -Goal -180  Discharge plan:  -anticipate discharge on basal/bolus insulin and Trulicity. Patient already has script filled for Trulicity 0.75 mg SQ weekly at home,  and he now agrees to start med.   Can f/u with his home Endocrinologist.      HLD  -Continue atorvastatin 40mg daily    Ginny Santacruz MD  pager  on 2/22/22  Other times:  Diabetes team: [phone] business hours  [phone] night/weekend

## 2022-02-22 NOTE — PROGRESS NOTE ADULT - ASSESSMENT
67M with PMH of CAD, NSTEMI s/p 3 stents in 2014 (stents to LAD, proximal and distal LCx), ischemic cardiomyopathy, HTN, T2DM c/b peripheral neuropathy, CVA, TIA, Afib on Pradaxa, chronic RLE wound, L carotid stenosis s/p endarterectomy (2014) presents to the ED with acute onset chest pain and sob     Problem/Plan - 1:  ·  Problem: Chest pain.   ·  Plan: Patient presents with atypical chest pain, possibly 2/2 Afib. Chest pain now resolved. Troponin elevated   - cardiology recs appreciated  c/w meds    - f/u TTE to assess cardiac function  - ischemic eval per cardiology./cath>likely tomorrow     Problem/Plan - 2:  ·  Problem: Cardiac arrhythmia.  ·  Plan: Patient had a run of VT on tele however asymptomatic  - currently Afib on tele  -c/w meds     Problem/Plan - 3:  ·  Problem: CAD (coronary artery disease).  ·  Plan: Patient has a history of CAD s/p 3 stents  c/w meds  chf hx       ·  Problem: Carotid stenosis.  ·  Plan: Patient has a history of carotic stenosis  - MRA showed greater than 75% stenosis of the R distal common carotic a, 60% stenosis of the prximal L internal carotid a  - continue aspirin qd  - continue statin qd.     Problem/Plan - 6:  ·  Problem: Cardiomyopathy, ischemic.  ·  Plan: Patient has a history of CHF  - continue diuresis with bumex, metolazone  - strict Is&Os  - daily weights  - continue aspirin, beta blocker, statin.      ·  Problem: Chronic kidney disease, unspecified CKD stage  renal f/u  alex better  - continue to monitor  - strict Is&Os.    ·  Problem: Afib.  ·  Plan: - continue pradaxa BID  - continue metoprolol 100mg qd.      ·  Problem: Hypertension.c/w meds  - monitor blood pressures closely.      ·  Problem: T2DM (type 2 diabetes mellitus).c/w tx  - monitor blood glucose closely in the setting of CKD.    ·  Problem: Preventive measure.   ·  Plan: - DVT ppx: Pradaxa  - Diet: DASH/CC    wound care for foot

## 2022-02-22 NOTE — PROGRESS NOTE ADULT - SUBJECTIVE AND OBJECTIVE BOX
CARDIOLOGY FOLLOW UP - Dr. Mazariegos  Date of Service: 2/22/22  CC: feeling better  oob to chair  denies cp, sob, and palpitations     Review of Systems:  Constitutional: No fever, weight loss, or fatigue  Respiratory: No cough, wheezing, or hemoptysis, no shortness of breath  Cardiovascular: No chest pain, palpitations, passing out, dizziness, or leg swelling  Gastrointestinal: No abd or epigastric pain.  No nausea, vomiting, or hematemesis; no diarrhea or constipation, no melena or hematochezia  Vascular: no edema       PHYSICAL EXAM:  T(C): 36.3 (02-22-22 @ 13:25), Max: 36.6 (02-21-22 @ 22:03)  HR: 84 (02-22-22 @ 13:25) (63 - 84)  BP: 143/81 (02-22-22 @ 13:25) (143/81 - 163/71)  RR: 17 (02-22-22 @ 13:25) (17 - 18)  SpO2: 98% (02-22-22 @ 13:25) (94% - 98%)  Wt(kg): --  I&O's Summary    21 Feb 2022 07:01  -  22 Feb 2022 07:00  --------------------------------------------------------  IN: 1327 mL / OUT: 2500 mL / NET: -1173 mL    22 Feb 2022 07:01  -  22 Feb 2022 13:59  --------------------------------------------------------  IN: 479 mL / OUT: 1100 mL / NET: -621 mL        Appearance: Normal	  Cardiovascular:  irreg , No JVD, No murmurs  Respiratory: Lungs clear to auscultation	  Gastrointestinal:  Soft, Non-tender, + BS	  Extremities: Normal range of motion, No clubbing, cyanosis or edema      Home Medications:  acetaminophen 325 mg oral tablet: 2 tab(s) orally every 6 hours, As needed, Temp greater or equal to 38C (100.4F), Mild Pain (1 - 3) (18 Feb 2022 21:56)  allopurinol 100 mg oral tablet: 1 tab(s) orally once a day (18 Feb 2022 21:56)  aspirin 81 mg oral delayed release tablet: 1 tab(s) orally once a day (18 Feb 2022 21:56)  bumetanide 2 mg oral tablet: 1 tab(s) orally once a day (18 Feb 2022 21:56)  insulin glargine 100 units/mL subcutaneous solution: 25 unit(s) subcutaneous once a day (18 Feb 2022 21:56)  metOLazone 5 mg oral tablet: 1 tab(s) orally 3 times a week (18 Feb 2022 21:56)  metoprolol succinate 50 mg oral tablet, extended release: 2 tab(s) orally once a day (18 Feb 2022 21:56)  NovoLOG FlexPen 100 units/mL injectable solution: 10 unit(s) subcutaneous 3 times a day (18 Feb 2022 21:56)  oxycodone-acetaminophen 5 mg-325 mg oral tablet: 1 tab(s) orally 2 times a day (18 Feb 2022 21:56)  Pradaxa 150 mg oral capsule: 1 cap(s) orally 2 times a day (18 Feb 2022 21:56)  pregabalin 100 mg oral capsule: 1 cap(s) orally 3 times a day (18 Feb 2022 21:56)      MEDICATIONS  (STANDING):  allopurinol 100 milliGRAM(s) Oral daily  aspirin enteric coated 81 milliGRAM(s) Oral daily  atorvastatin 40 milliGRAM(s) Oral at bedtime  clopidogrel Tablet 75 milliGRAM(s) Oral daily  dextrose 40% Gel 15 Gram(s) Oral once  dextrose 5%. 1000 milliLiter(s) (50 mL/Hr) IV Continuous <Continuous>  dextrose 5%. 1000 milliLiter(s) (100 mL/Hr) IV Continuous <Continuous>  dextrose 50% Injectable 25 Gram(s) IV Push once  dextrose 50% Injectable 12.5 Gram(s) IV Push once  dextrose 50% Injectable 25 Gram(s) IV Push once  diltiazem    milliGRAM(s) Oral at bedtime  glucagon  Injectable 1 milliGRAM(s) IntraMuscular once  heparin  Infusion.  Unit(s)/Hr (24 mL/Hr) IV Continuous <Continuous>  insulin glargine Injectable (LANTUS) 25 Unit(s) SubCutaneous at bedtime  insulin lispro (ADMELOG) corrective regimen sliding scale   SubCutaneous three times a day before meals  insulin lispro (ADMELOG) corrective regimen sliding scale   SubCutaneous at bedtime  insulin lispro Injectable (ADMELOG) 12 Unit(s) SubCutaneous before lunch  insulin lispro Injectable (ADMELOG) 10 Unit(s) SubCutaneous before dinner  metoprolol succinate  milliGRAM(s) Oral daily  pregabalin 100 milliGRAM(s) Oral three times a day      TELEMETRY: 	  afib 70-80s   ECG:  	  RADIOLOGY:  < from: [facility] Duplex Lower Ext Vein Scan, Bilat (02.22.22 @ 12:34) >  color and spectral Doppler, with and without compression.    FINDINGS:    RIGHT:  Normal compressibility of the RIGHT common femoral, femoral and popliteal   veins.  Doppler examination shows normal spontaneous and phasic flow.  No RIGHT calf vein thrombosis is detected.    LEFT:  Normal compressibility of the LEFT common femoral, femoral and popliteal   veins.  Doppler examination shows normal spontaneous and phasic flow.  No LEFT calf vein thrombosis is detected.    IMPRESSION:  No evidence of deep venous thrombosis in either lower extremity.    < end of copied text >    DIAGNOSTIC TESTING:  [ ] Echocardiogram:  [ ]  Catheterization:  [ ] Stress Test:    OTHER: 	    LABS:	 	    Troponin T, High Sensitivity Result: 2016 ng/L [0 - 51] (02-20 @ 12:23)  Creatine Kinase, Serum: 88 U/L [30 - 200] (02-20 @ 12:23)  CKMB Units: 8.1 ng/mL [0.0 - 6.7] (02-20 @ 12:23)  Creatine Kinase, Serum: 123 U/L [30 - 200] (02-20 @ 02:09)  Creatine Kinase, Serum: 154 U/L [30 - 200] (02-19 @ 16:39)  Troponin T, High Sensitivity Result: 1192 ng/L [0 - 51] (02-19 @ 16:39)  Creatine Kinase, Serum: 226 U/L [30 - 200] (02-19 @ 07:17)  CKMB Units: 23.2 ng/mL [0.0 - 6.7] (02-19 @ 07:17)  Troponin T, High Sensitivity Result: 1127 ng/L [0 - 51] (02-19 @ 07:17)  Creatine Kinase, Serum: 270 U/L [30 - 200] (02-19 @ 00:12)  CKMB Units: 30.3 ng/mL [0.0 - 6.7] (02-19 @ 00:12)  Troponin T, High Sensitivity Result: 971 ng/L [0 - 51] (02-19 @ 00:12)  Troponin T, High Sensitivity Result: 932 ng/L [0 - 51] (02-18 @ 17:23)                          13.8   9.30  )-----------( 182      ( 22 Feb 2022 07:23 )             45.2     02-22    132<L>  |  93<L>  |  79<H>  ----------------------------<  190<H>  3.6   |  26  |  2.42<H>    Ca    9.2      22 Feb 2022 07:24  Phos  5.2     02-21  Mg     2.2     02-21      PTT - ( 22 Feb 2022 07:25 )  PTT:69.6 sec

## 2022-02-22 NOTE — PROGRESS NOTE ADULT - ASSESSMENT
Mr. Stevens is an 67 year old gentleman with PMH of CAD, NSTEMI s/p 3 stents in 2014 (stents to LAD, proximal and distal LCx), ischemic cardiomyopathy, HTN, T2DM c/b peripheral neuropathy, CVA, TIA, Afib on Pradaxa, chronic RLE wound, L carotid stenosis s/p endarterectomy (2014) presents to the ED with acute onset chest pain and sob.     1. Now ANT on CKD  2.  CHF    3. Electrolytes are acceptable.   4. Elevated bicarb in the setting of possible CO2 retention.   5. Cardiomyopathy defer to primary  6. Proteinuria   7- htn hx     ant in setting of requiring diuretics for chf  now being held. cont to hold for another 48 hr unless pt becomes sob   cr is now improving   pt for coronary angio in am. d/w pt risks of worsening renal function with coronary angio  to have gentle ivf hydration atiya procedure    cont cardizem cd 120 mg daily

## 2022-02-22 NOTE — PROGRESS NOTE ADULT - SUBJECTIVE AND OBJECTIVE BOX
Diabetes  Follow up note    Interval History: Pt feeling hungry, good appetite, eating meals.  Concerned about increasing insulin doses too much    MEDICATIONS  (STANDING):  allopurinol 100 milliGRAM(s) Oral daily  aspirin enteric coated 81 milliGRAM(s) Oral daily  atorvastatin 40 milliGRAM(s) Oral at bedtime  clopidogrel Tablet 75 milliGRAM(s) Oral daily  dextrose 40% Gel 15 Gram(s) Oral once  dextrose 5%. 1000 milliLiter(s) (50 mL/Hr) IV Continuous <Continuous>  dextrose 5%. 1000 milliLiter(s) (100 mL/Hr) IV Continuous <Continuous>  dextrose 50% Injectable 25 Gram(s) IV Push once  dextrose 50% Injectable 12.5 Gram(s) IV Push once  dextrose 50% Injectable 25 Gram(s) IV Push once  diltiazem    milliGRAM(s) Oral at bedtime  glucagon  Injectable 1 milliGRAM(s) IntraMuscular once  heparin  Infusion.  Unit(s)/Hr (24 mL/Hr) IV Continuous <Continuous>  insulin glargine Injectable (LANTUS) 25 Unit(s) SubCutaneous at bedtime  insulin lispro (ADMELOG) corrective regimen sliding scale   SubCutaneous three times a day before meals  insulin lispro (ADMELOG) corrective regimen sliding scale   SubCutaneous at bedtime  insulin lispro Injectable (ADMELOG) 18 Unit(s) SubCutaneous before breakfast  insulin lispro Injectable (ADMELOG) 12 Unit(s) SubCutaneous before lunch  insulin lispro Injectable (ADMELOG) 10 Unit(s) SubCutaneous before dinner  metoprolol succinate  milliGRAM(s) Oral daily  pregabalin 100 milliGRAM(s) Oral three times a day    MEDICATIONS  (PRN):  heparin   Injectable 47638 Unit(s) IV Push every 6 hours PRN For aPTT less than 40  heparin   Injectable 5000 Unit(s) IV Push every 6 hours PRN For aPTT between 40 - 57  oxycodone    5 mG/acetaminophen 325 mG 1 Tablet(s) Oral every 12 hours PRN Severe Pain (7 - 10)      Allergies    No Known Allergies    Intolerances          PHYSICAL EXAM:  VITALS: T(C): 36.3 (02-22-22 @ 07:59)  T(F): 97.3 (02-22-22 @ 07:59), Max: 97.9 (02-21-22 @ 13:53)  HR: 63 (02-22-22 @ 07:59) (63 - 81)  BP: 163/71 (02-22-22 @ 07:59) (147/69 - 163/71)  RR:  (17 - 18)  SpO2:  (94% - 98%)  GENERAL:Sitting up in chair in NAD,   EYES: No proptosis,  HEENT:  Atraumatic, Normocephalic,   RESPIRATORY: Clear to auscultation bilaterally  CARDIOVASCULAR: Regular rhythm;   GI: Soft, nontender, non distended, normal bowel sounds        POCT Blood Glucose.: 221 mg/dL (02-22-22 @ 12:41)  POCT Blood Glucose.: 187 mg/dL (02-22-22 @ 08:11)  POCT Blood Glucose.: 173 mg/dL (02-21-22 @ 21:49)  POCT Blood Glucose.: 144 mg/dL (02-21-22 @ 18:24)  POCT Blood Glucose.: 315 mg/dL (02-21-22 @ 13:15)  POCT Blood Glucose.: 174 mg/dL (02-21-22 @ 08:57)  POCT Blood Glucose.: 166 mg/dL (02-20-22 @ 22:35)  POCT Blood Glucose.: 167 mg/dL (02-20-22 @ 18:01)  POCT Blood Glucose.: 337 mg/dL (02-20-22 @ 12:27)  POCT Blood Glucose.: 156 mg/dL (02-20-22 @ 08:04)  POCT Blood Glucose.: 169 mg/dL (02-19-22 @ 21:32)  POCT Blood Glucose.: 227 mg/dL (02-19-22 @ 17:58)  POCT Blood Glucose.: 207 mg/dL (02-19-22 @ 14:50)        02-22    132<L>  |  93<L>  |  79<H>  ----------------------------<  190<H>  3.6   |  26  |  2.42<H>    EGFR if : 31<L>  EGFR if non : 27<L>    Ca    9.2      02-22  Mg     2.2     02-21  Phos  5.2     02-21          A1C with Estimated Average Glucose Result: 10.1 % (02-19-22 @ 12:18)  A1C with Estimated Average Glucose Result: 9.3 % (10-22-21 @ 08:56)

## 2022-02-22 NOTE — CONSULT NOTE ADULT - SUBJECTIVE AND OBJECTIVE BOX
Patient is a 67y old  Male who presents with a chief complaint of chest pain (22 Feb 2022 22:36)      HPI:  67M with PMH of CAD, NSTEMI s/p 3 stents in 2014 (stents to LAD, proximal and distal LCx), ischemic cardiomyopathy, HTN, T2DM c/b peripheral neuropathy, CVA, TIA, Afib on Pradaxa, chronic RLE wound, L carotid stenosis s/p endarterectomy (2014) presents to the ED with acute onset chest pain for one day. Pain started right after dinner and was continuous. Pain was sharp, burning, radiated to the L side of the chest. Patient also had associated shortness of breath. He was unable to lay flat, sat up in bed all night, Usually uses 2 pillows. Dyspnea worse on exertion. Patient denies any nausea, vomiting, abdominal pain, diarrhea or dysuria. No melena or hematochezia. Of note, patient missed a few doses of metoprolol.     In the ED, T is 97.4, , /73, RR 18 satting 97% on room air. Patient received aspirin 324mg X1.  (18 Feb 2022 21:00)    Podiatry consulted for left foot ulceration.    PAST MEDICAL & SURGICAL HISTORY:  HTN (hypertension), benign    HLD (hyperlipidemia)    DM type 2 (diabetes mellitus, type 2)    TIA (transient ischemic attack)    Atrial fibrillation    MI (myocardial infarction)  circa 2014    CAD (coronary artery disease)    Neuropathy    Status post angioplasty with stent  LULU x 3 2/7/2014    S/P carotid endarterectomy  left        MEDICATIONS  (STANDING):  allopurinol 100 milliGRAM(s) Oral daily  aspirin enteric coated 81 milliGRAM(s) Oral daily  atorvastatin 40 milliGRAM(s) Oral at bedtime  clopidogrel Tablet 75 milliGRAM(s) Oral daily  dextrose 40% Gel 15 Gram(s) Oral once  dextrose 5%. 1000 milliLiter(s) (50 mL/Hr) IV Continuous <Continuous>  dextrose 5%. 1000 milliLiter(s) (100 mL/Hr) IV Continuous <Continuous>  dextrose 50% Injectable 25 Gram(s) IV Push once  dextrose 50% Injectable 12.5 Gram(s) IV Push once  dextrose 50% Injectable 25 Gram(s) IV Push once  diltiazem    milliGRAM(s) Oral at bedtime  glucagon  Injectable 1 milliGRAM(s) IntraMuscular once  heparin  Infusion.  Unit(s)/Hr (24 mL/Hr) IV Continuous <Continuous>  insulin glargine Injectable (LANTUS) 25 Unit(s) SubCutaneous at bedtime  insulin lispro (ADMELOG) corrective regimen sliding scale   SubCutaneous three times a day before meals  insulin lispro (ADMELOG) corrective regimen sliding scale   SubCutaneous at bedtime  insulin lispro Injectable (ADMELOG) 20 Unit(s) SubCutaneous before breakfast  insulin lispro Injectable (ADMELOG) 12 Unit(s) SubCutaneous before lunch  insulin lispro Injectable (ADMELOG) 10 Unit(s) SubCutaneous before dinner  metoprolol succinate  milliGRAM(s) Oral daily  pregabalin 100 milliGRAM(s) Oral three times a day  sodium chloride 0.9%. 1000 milliLiter(s) (60 mL/Hr) IV Continuous <Continuous>    MEDICATIONS  (PRN):  heparin   Injectable 65094 Unit(s) IV Push every 6 hours PRN For aPTT less than 40  heparin   Injectable 5000 Unit(s) IV Push every 6 hours PRN For aPTT between 40 - 57  oxycodone    5 mG/acetaminophen 325 mG 1 Tablet(s) Oral every 12 hours PRN Severe Pain (7 - 10)      Allergies    No Known Allergies    Intolerances        VITALS:    Vital Signs Last 24 Hrs  T(C): 37.6 (22 Feb 2022 20:55), Max: 37.6 (22 Feb 2022 20:55)  T(F): 99.7 (22 Feb 2022 20:55), Max: 99.7 (22 Feb 2022 20:55)  HR: 74 (22 Feb 2022 20:55) (63 - 84)  BP: 151/74 (22 Feb 2022 20:55) (143/81 - 163/71)  BP(mean): --  RR: 18 (22 Feb 2022 20:55) (17 - 18)  SpO2: 97% (22 Feb 2022 20:55) (94% - 98%)    LABS:                          13.8   9.30  )-----------( 182      ( 22 Feb 2022 07:23 )             45.2       02-22    132<L>  |  93<L>  |  79<H>  ----------------------------<  190<H>  3.6   |  26  |  2.42<H>    Ca    9.2      22 Feb 2022 07:24  Phos  5.2     02-21  Mg     2.2     02-21        CAPILLARY BLOOD GLUCOSE      POCT Blood Glucose.: 209 mg/dL (22 Feb 2022 21:28)  POCT Blood Glucose.: 210 mg/dL (22 Feb 2022 17:16)  POCT Blood Glucose.: 221 mg/dL (22 Feb 2022 12:41)  POCT Blood Glucose.: 187 mg/dL (22 Feb 2022 08:11)      PTT - ( 22 Feb 2022 07:25 )  PTT:69.6 sec    LOWER EXTREMITY PHYSICAL EXAM:    Vasular: DP/PT NP, B/L, CFT <3 seconds B/L, Temperature gradient WNL, B/L.   Neuro: Epicritic sensation intact to the level of digits B/L.  Musculoskeletal/Ortho: unremarkable  Derm: Bilateral longstanding venous stasis, serous blister noted right lower extremities, left forefoot plantar forefoot ulcer to subQ with no signs of infection noted, fibrogranular base 4x1.5x0.1 cm

## 2022-02-22 NOTE — PROGRESS NOTE ADULT - SUBJECTIVE AND OBJECTIVE BOX
DATE OF SERVICE: 02-22-22 @ 13:23  CHIEF COMPLAINT:Patient is a 67y old  Male who presents with a chief complaint of chest pain (22 Feb 2022 13:01)    	        PAST MEDICAL & SURGICAL HISTORY:  HTN (hypertension), benign    HLD (hyperlipidemia)    DM type 2 (diabetes mellitus, type 2)    TIA (transient ischemic attack)    Atrial fibrillation    MI (myocardial infarction)  circa 2014    CAD (coronary artery disease)    Neuropathy    Status post angioplasty with stent  LULU x 3 2/7/2014    S/P carotid endarterectomy  left            REVIEW OF SYSTEMS:  CONSTITUTIONAL: No fever, weight loss, or fatigue  EYES: No eye pain, visual disturbances, or discharge  NECK: No pain or stiffness  RESPIRATORY: No cough, wheezing, chills or hemoptysis; No Shortness of Breath  CARDIOVASCULAR: No chest pain, palpitations, passing out, dizziness, or leg swelling  GASTROINTESTINAL: No abdominal or epigastric pain. No nausea, vomiting, or hematemesis; No diarrhea or constipation. No melena or hematochezia.  GENITOURINARY: No dysuria, frequency, hematuria, or incontinence  NEUROLOGICAL: No headaches, memory loss, loss of strength, numbness, or tremors  SKIN: No itching, burning, rashes, or lesions   LYMPH Nodes: No enlarged glands  ENDOCRINE: No heat or cold intolerance; No hair loss  MUSCULOSKELETAL: No joint pain or swelling; No muscle, back, or extremity pain    Medications:  MEDICATIONS  (STANDING):  allopurinol 100 milliGRAM(s) Oral daily  aspirin enteric coated 81 milliGRAM(s) Oral daily  atorvastatin 40 milliGRAM(s) Oral at bedtime  clopidogrel Tablet 75 milliGRAM(s) Oral daily  dextrose 40% Gel 15 Gram(s) Oral once  dextrose 5%. 1000 milliLiter(s) (50 mL/Hr) IV Continuous <Continuous>  dextrose 5%. 1000 milliLiter(s) (100 mL/Hr) IV Continuous <Continuous>  dextrose 50% Injectable 25 Gram(s) IV Push once  dextrose 50% Injectable 12.5 Gram(s) IV Push once  dextrose 50% Injectable 25 Gram(s) IV Push once  diltiazem    milliGRAM(s) Oral at bedtime  glucagon  Injectable 1 milliGRAM(s) IntraMuscular once  heparin  Infusion.  Unit(s)/Hr (24 mL/Hr) IV Continuous <Continuous>  insulin glargine Injectable (LANTUS) 25 Unit(s) SubCutaneous at bedtime  insulin lispro (ADMELOG) corrective regimen sliding scale   SubCutaneous three times a day before meals  insulin lispro (ADMELOG) corrective regimen sliding scale   SubCutaneous at bedtime  insulin lispro Injectable (ADMELOG) 12 Unit(s) SubCutaneous before lunch  insulin lispro Injectable (ADMELOG) 10 Unit(s) SubCutaneous before dinner  metoprolol succinate  milliGRAM(s) Oral daily  pregabalin 100 milliGRAM(s) Oral three times a day    MEDICATIONS  (PRN):  heparin   Injectable 57250 Unit(s) IV Push every 6 hours PRN For aPTT less than 40  heparin   Injectable 5000 Unit(s) IV Push every 6 hours PRN For aPTT between 40 - 57  oxycodone    5 mG/acetaminophen 325 mG 1 Tablet(s) Oral every 12 hours PRN Severe Pain (7 - 10)    	    PHYSICAL EXAM:  T(C): 36.3 (02-22-22 @ 07:59), Max: 36.6 (02-21-22 @ 13:53)  HR: 63 (02-22-22 @ 07:59) (63 - 81)  BP: 163/71 (02-22-22 @ 07:59) (147/69 - 163/71)  RR: 17 (02-22-22 @ 07:59) (17 - 18)  SpO2: 96% (02-22-22 @ 07:59) (94% - 98%)  Wt(kg): --  I&O's Summary    21 Feb 2022 07:01  -  22 Feb 2022 07:00  --------------------------------------------------------  IN: 1327 mL / OUT: 2500 mL / NET: -1173 mL    22 Feb 2022 07:01  -  22 Feb 2022 13:23  --------------------------------------------------------  IN: 240 mL / OUT: 400 mL / NET: -160 mL        Appearance: Normal	  HEENT:   Normal oral mucosa, PERRL, EOMI	  Lymphatic: No lymphadenopathy  Cardiovascular: Normal S1 S2, No JVD, No murmurs, No edema  Respiratory: Lungs clear to auscultation	  Psychiatry: A & O x 3, Mood & affect appropriate  Gastrointestinal:  Soft, Non-tender, + BS	  Skin: No rashes, No ecchymoses, No cyanosis	  Neurologic: Non-focal  Extremities: Normal range of motion, No clubbing, cyanosis or edema  Vascular: Peripheral pulses palpable 2+ bilaterally    TELEMETRY: 	    ECG:  	  RADIOLOGY:  OTHER: 	  	  LABS:	 	    CARDIAC MARKERS:                                13.8   9.30  )-----------( 182      ( 22 Feb 2022 07:23 )             45.2     02-22    132<L>  |  93<L>  |  79<H>  ----------------------------<  190<H>  3.6   |  26  |  2.42<H>    Ca    9.2      22 Feb 2022 07:24  Phos  5.2     02-21  Mg     2.2     02-21      proBNP:   Lipid Profile:   HgA1c:   TSH:     	         DATE OF SERVICE: 02-22-22 @ 13:23  CHIEF COMPLAINT:Patient is a 67y old  Male who presents with a chief complaint of chest pain (22 Feb 2022 13:01)    	        PAST MEDICAL & SURGICAL HISTORY:  HTN (hypertension), benign    HLD (hyperlipidemia)    DM type 2 (diabetes mellitus, type 2)    TIA (transient ischemic attack)    Atrial fibrillation    MI (myocardial infarction)  circa 2014    CAD (coronary artery disease)    Neuropathy    Status post angioplasty with stent  LULU x 3 2/7/2014    S/P carotid endarterectomy  left            feels better  RESPIRATORY: dec sob  CARDIOVASCULAR: No chest pain, palpitations, passing out,   GASTROINTESTINAL: No abdominal or epigastric pain. No nausea, vomiting, or hematemesis;   GENITOURINARY: No dysuria, frequency, hematuria, or incontinence  NEUROLOGICAL: No headaches,     Medications:  MEDICATIONS  (STANDING):  allopurinol 100 milliGRAM(s) Oral daily  aspirin enteric coated 81 milliGRAM(s) Oral daily  atorvastatin 40 milliGRAM(s) Oral at bedtime  clopidogrel Tablet 75 milliGRAM(s) Oral daily  dextrose 40% Gel 15 Gram(s) Oral once  dextrose 5%. 1000 milliLiter(s) (50 mL/Hr) IV Continuous <Continuous>  dextrose 5%. 1000 milliLiter(s) (100 mL/Hr) IV Continuous <Continuous>  dextrose 50% Injectable 25 Gram(s) IV Push once  dextrose 50% Injectable 12.5 Gram(s) IV Push once  dextrose 50% Injectable 25 Gram(s) IV Push once  diltiazem    milliGRAM(s) Oral at bedtime  glucagon  Injectable 1 milliGRAM(s) IntraMuscular once  heparin  Infusion.  Unit(s)/Hr (24 mL/Hr) IV Continuous <Continuous>  insulin glargine Injectable (LANTUS) 25 Unit(s) SubCutaneous at bedtime  insulin lispro (ADMELOG) corrective regimen sliding scale   SubCutaneous three times a day before meals  insulin lispro (ADMELOG) corrective regimen sliding scale   SubCutaneous at bedtime  insulin lispro Injectable (ADMELOG) 12 Unit(s) SubCutaneous before lunch  insulin lispro Injectable (ADMELOG) 10 Unit(s) SubCutaneous before dinner  metoprolol succinate  milliGRAM(s) Oral daily  pregabalin 100 milliGRAM(s) Oral three times a day    MEDICATIONS  (PRN):  heparin   Injectable 43968 Unit(s) IV Push every 6 hours PRN For aPTT less than 40  heparin   Injectable 5000 Unit(s) IV Push every 6 hours PRN For aPTT between 40 - 57  oxycodone    5 mG/acetaminophen 325 mG 1 Tablet(s) Oral every 12 hours PRN Severe Pain (7 - 10)    	    PHYSICAL EXAM:  T(C): 36.3 (02-22-22 @ 07:59), Max: 36.6 (02-21-22 @ 13:53)  HR: 63 (02-22-22 @ 07:59) (63 - 81)  BP: 163/71 (02-22-22 @ 07:59) (147/69 - 163/71)  RR: 17 (02-22-22 @ 07:59) (17 - 18)  SpO2: 96% (02-22-22 @ 07:59) (94% - 98%)  Wt(kg): --  I&O's Summary    21 Feb 2022 07:01  -  22 Feb 2022 07:00  --------------------------------------------------------  IN: 1327 mL / OUT: 2500 mL / NET: -1173 mL    22 Feb 2022 07:01  -  22 Feb 2022 13:23  --------------------------------------------------------  IN: 240 mL / OUT: 400 mL / NET: -160 mL        Appearance: Normal	  HEENT:   Normal oral mucosa, PERRL, EOMI	    Cardiovascular: Normal S1 S2, No JVD,  Respiratory: Lungs clear to auscultation	  Psychiatry: A & O   Gastrointestinal:  Soft, Non-tender, + BS	    Neurologic: Non-focal  Extremities:pvd  edema  TELEMETRY: 	    ECG:  	  RADIOLOGY:  OTHER: 	  	  LABS:	 	    CARDIAC MARKERS:                                13.8   9.30  )-----------( 182      ( 22 Feb 2022 07:23 )             45.2     02-22    132<L>  |  93<L>  |  79<H>  ----------------------------<  190<H>  3.6   |  26  |  2.42<H>    Ca    9.2      22 Feb 2022 07:24  Phos  5.2     02-21  Mg     2.2     02-21      proBNP:   Lipid Profile:   HgA1c:   TSH:

## 2022-02-22 NOTE — PROGRESS NOTE ADULT - PROBLEM SELECTOR PLAN 2
-NSTEMI, CAD, Elevated troponin  -Plavix, asa, heparin gtt per cards   -F/u TTE  -Plans for cath once creatinine stable as per cards

## 2022-02-22 NOTE — CONSULT NOTE ADULT - ASSESSMENT
66 y/o male pt with left foot ulcer to subQ  - pt seen and evaluated  - left foot ulcer noted with no signs of infection  - wound cleansed and dressing applied   - rec daily dressing changes with santyl and DSD  - rec z flow boots in bed at all times  - will monitor periodically during this admission, reconsult sooner if necessary

## 2022-02-22 NOTE — PROGRESS NOTE ADULT - PROBLEM SELECTOR PLAN 1
suspect cardiac in origin given elevated trops and LE edema  -Endorses compliance with Pradaxa as an outpt  -LE edema, Keep O>I as tolerated. Edema improving.   -Pt with hx of hydroPTX. CT chest non contrast with pleural thickening, atelectasis LLL. Will require repeat CT chest in 1-3 months. Findings appear chronic, doubt this is the cause of his acute SOB.   -LE duplex neg DVT   -Normoxic, keep sats >92% with supplemental O2 PRN.

## 2022-02-22 NOTE — PROGRESS NOTE ADULT - ASSESSMENT
Echo 10/17/19: EF 45%, Grossly borderline left ventricular systolic dysfunction.   ** Compared with echocardiogram of 12/29/2018, no significant changes noted (reported LVEF in last study is slightly lower, but atrial fibrillation makes it difficult to assess ejection fraction).    a/p  66 y/o male with a past medical history of HTN, DMT2 c/b LLE neuropathy, CVA/TIA, Afib (on Pradaxa , MI, CAD (NSTEMI) x 3 stents in 2014 to LAD, and proximal and distal LCx, ischemic cardiomyopathy, chronic unhealing RLE wound s/p angio, left CEA (2014), CKD presents with acute onset chest pain, pleuritic and inc dyspnea    #Chest pain, NSTEMI, CAD, s/p PCI  -in setting of increased AF rates off bb for 3 days  -resolved chest pain  -CKMB peaked  -c/w toprol, asa, plavix   -cont IV HEP  -pending echo   -plan for cath tomorrow if creat improved     #R carotid stenosis  -cont med tx  -MRA noted with Greater than 75% stenosis of the right distal common carotid artery. Approximately 60% stenosis of the proximal left internal carotid artery.  -Continue ASA and Statin Therapy    #Ischemic cardiomyopathy, chronic systolic/diastolic HF  -BB, ASA, Statin  -IV bumex/ po metolazone on hold given creat rising   -pending echo     #HTN  -stable, cont home meds  -off ACE due to hyperkalemia hx    #alex on ckd  -diuretics on hold  -renal following     #Afib  -rates stable  -c/w CCB and BB  -pradaxa on hold-- cont Hep gtt     -Advanced care planning discussed with patient. Advanced care planning forms discussed with patient and/or family.  Risks, benefits, and alternatives of medical/cardiac procedures were discussed in detail with all questions answered.  30 minutes were spent addressing advance care planning.      d/w floor acp

## 2022-02-23 NOTE — PHYSICAL THERAPY INITIAL EVALUATION ADULT - PLANNED THERAPY INTERVENTIONS, PT EVAL
LTG 1: Stairs - Pt will be independent with negotiation of 3 steps w/ handrail within 4 weeks./balance training/bed mobility training/gait training/transfer training

## 2022-02-23 NOTE — PROGRESS NOTE ADULT - SUBJECTIVE AND OBJECTIVE BOX
DATE OF SERVICE: 02-23-22 @ 11:23  CHIEF COMPLAINT:Patient is a 67y old  Male who presents with a chief complaint of chest pain (22 Feb 2022 22:36)    	        PAST MEDICAL & SURGICAL HISTORY:  HTN (hypertension), benign    HLD (hyperlipidemia)    DM type 2 (diabetes mellitus, type 2)    TIA (transient ischemic attack)    Atrial fibrillation    MI (myocardial infarction)  circa 2014    CAD (coronary artery disease)    Neuropathy    Status post angioplasty with stent  LULU x 3 2/7/2014    S/P carotid endarterectomy  left            REVIEW OF SYSTEMS:  dec sob  RESPIRATORY: No cough, wheezing, chills or hemoptysis; No Shortness of Breath  CARDIOVASCULAR: No chest pain, palpitations, passing out,   GASTROINTESTINAL: No abdominal or epigastric pain. No nausea, vomiting, or hematemesis;   GENITOURINARY: No dysuria, frequency, hematuria, or incontinence  NEUROLOGICAL: No headaches,     Medications:  MEDICATIONS  (STANDING):  allopurinol 100 milliGRAM(s) Oral daily  aspirin enteric coated 81 milliGRAM(s) Oral daily  atorvastatin 40 milliGRAM(s) Oral at bedtime  clopidogrel Tablet 75 milliGRAM(s) Oral daily  collagenase Ointment 1 Application(s) Topical daily  dextrose 40% Gel 15 Gram(s) Oral once  dextrose 5%. 1000 milliLiter(s) (50 mL/Hr) IV Continuous <Continuous>  dextrose 5%. 1000 milliLiter(s) (100 mL/Hr) IV Continuous <Continuous>  dextrose 50% Injectable 25 Gram(s) IV Push once  dextrose 50% Injectable 12.5 Gram(s) IV Push once  dextrose 50% Injectable 25 Gram(s) IV Push once  diltiazem    milliGRAM(s) Oral at bedtime  glucagon  Injectable 1 milliGRAM(s) IntraMuscular once  heparin  Infusion.  Unit(s)/Hr (24 mL/Hr) IV Continuous <Continuous>  insulin glargine Injectable (LANTUS) 25 Unit(s) SubCutaneous at bedtime  insulin lispro (ADMELOG) corrective regimen sliding scale   SubCutaneous three times a day before meals  insulin lispro (ADMELOG) corrective regimen sliding scale   SubCutaneous at bedtime  insulin lispro Injectable (ADMELOG) 20 Unit(s) SubCutaneous before breakfast  insulin lispro Injectable (ADMELOG) 12 Unit(s) SubCutaneous before lunch  insulin lispro Injectable (ADMELOG) 10 Unit(s) SubCutaneous before dinner  metoprolol succinate  milliGRAM(s) Oral daily  pregabalin 100 milliGRAM(s) Oral three times a day  sodium chloride 0.9%. 1000 milliLiter(s) (60 mL/Hr) IV Continuous <Continuous>    MEDICATIONS  (PRN):  heparin   Injectable 15863 Unit(s) IV Push every 6 hours PRN For aPTT less than 40  heparin   Injectable 5000 Unit(s) IV Push every 6 hours PRN For aPTT between 40 - 57  oxycodone    5 mG/acetaminophen 325 mG 1 Tablet(s) Oral every 12 hours PRN Severe Pain (7 - 10)    	    PHYSICAL EXAM:  T(C): 36.5 (02-23-22 @ 09:49), Max: 37.6 (02-22-22 @ 20:55)  HR: 69 (02-23-22 @ 09:49) (69 - 84)  BP: 126/73 (02-23-22 @ 10:16) (101/64 - 153/70)  RR: 18 (02-23-22 @ 09:49) (17 - 18)  SpO2: 96% (02-23-22 @ 09:49) (94% - 98%)  Wt(kg): --  I&O's Summary    22 Feb 2022 07:01  -  23 Feb 2022 07:00  --------------------------------------------------------  IN: 1488 mL / OUT: 3025 mL / NET: -1537 mL    23 Feb 2022 07:01  -  23 Feb 2022 11:23  --------------------------------------------------------  IN: 200 mL / OUT: 0 mL / NET: 200 mL          HEENT:   Normal oral mucosa, PERRL, EOMI	  Cardiovascular: Normal S1 S2, No JVD,  Respiratory:dec bs  Psychiatry: A & O   Gastrointestinal:  Soft, Non-tender, + BS	  Neurologic: Non-focal  Extremities: edema  pvd  TELEMETRY: 	    ECG:  	  RADIOLOGY:  OTHER: 	  	  LABS:	 	    CARDIAC MARKERS:                                13.1   8.40  )-----------( 186      ( 23 Feb 2022 07:02 )             42.8     02-23    135  |  96  |  76<H>  ----------------------------<  180<H>  4.0   |  25  |  2.03<H>    Ca    9.1      23 Feb 2022 07:27      proBNP:   Lipid Profile:   HgA1c:   TSH:

## 2022-02-23 NOTE — PROGRESS NOTE ADULT - ASSESSMENT
Echo 2/22/22: EF 60%, nl lv sys fx, mild TR, mild Pr  Echo 10/17/19: EF 45%, Grossly borderline left ventricular systolic dysfunction.   ** Compared with echocardiogram of 12/29/2018, no significant changes noted (reported LVEF in last study is slightly lower, but atrial fibrillation makes it difficult to assess ejection fraction).    a/p  66 y/o male with a past medical history of HTN, DMT2 c/b LLE neuropathy, CVA/TIA, Afib (on Pradaxa , MI, CAD (NSTEMI) x 3 stents in 2014 to LAD, and proximal and distal LCx, ischemic cardiomyopathy, chronic unhealing RLE wound s/p angio, left CEA (2014), CKD presents with acute onset chest pain, pleuritic and inc dyspnea    #Chest pain, NSTEMI, CAD, s/p PCI  -in setting of increased AF rates off bb for 3 days  -resolved chest pain  -CKMB peaked  -c/w toprol, asa, plavix   -cont IV HEP  -Echo noted with EF 60%, nl lv sys fx, mild TR, mild Pr  -plan for cath today     #R carotid stenosis  -cont med tx  -MRA noted with Greater than 75% stenosis of the right distal common carotid artery. Approximately 60% stenosis of the proximal left internal carotid artery.  -Continue ASA and Statin Therapy    #Ischemic cardiomyopathy, chronic systolic/diastolic HF  -BB, ASA, Statin  -bumex/ po metolazone on hold given creat rising - eventual resume   -Echo as above     #HTN  -stable, cont home meds  -off ACE due to hyperkalemia hx    #alex on ckd  -diuretics on hold  -renal following     #Afib  -rates stable  -c/w CCB and BB  -pradaxa on hold-- cont Hep gtt     -Advanced care planning discussed with patient. Advanced care planning forms discussed with patient and/or family.  Risks, benefits, and alternatives of medical/cardiac procedures were discussed in detail with all questions answered.  30 minutes were spent addressing advance care planning.      d/w floor acp

## 2022-02-23 NOTE — PROGRESS NOTE ADULT - ASSESSMENT
66 y/o M with PMH of pleural effusion 2019 - s/p R thoracentesis then R chest tube placement with course c/b R hydroPTX - tx to LI for possible VATS decortication which was deferred, CAD, NSTEMI s/p 3 stents in 2014 (stents to LAD, proximal and distal LCx), ischemic cardiomyopathy, HTN, T2DM c/b peripheral neuropathy, CVA, TIA, Afib on Pradaxa, chronic RLE wound, L carotid stenosis s/p endarterectomy (2014). Presents to ED with acute onset chest pain, SOB for one day. Labs notable for elevated troponin.

## 2022-02-23 NOTE — CHART NOTE - NSCHARTNOTEFT_GEN_A_CORE
Pt w/ foot wound. Pt seen by DPM.  Will defer to dpm team, team agreeable. Remain available as requested

## 2022-02-23 NOTE — PHYSICAL THERAPY INITIAL EVALUATION ADULT - PERTINENT HX OF CURRENT PROBLEM, REHAB EVAL
67M with PMH of CAD, NSTEMI s/p 3 stents in 2014 (stents to LAD, proximal and distal LCx), ischemic cardiomyopathy, HTN, T2DM c/b peripheral neuropathy, CVA, TIA, Afib on Pradaxa, chronic RLE wound, L carotid stenosis s/p endarterectomy (2014) presents to the ED with acute onset chest pain and sob.

## 2022-02-23 NOTE — PROGRESS NOTE ADULT - SUBJECTIVE AND OBJECTIVE BOX
DIABETES FOLLOW UP : Seen earlier today    INTERVAL HX: pt tolerating, po frustrated w/ current diet restrictions as ordered. going for cath today. FBG remains above goal, prandial lunch BG improving with adjustments in admelog . Cr 2.03      Review of Systems:  General: As above  Cardiovascular: No chest pain  Respiratory: No SOB  GI: No nausea, vomiting  Endocrine: no  S&Sx of hypoglycemia    Allergies    No Known Allergies    Intolerances      MEDICATIONS  (STANDING):  allopurinol 100 milliGRAM(s) Oral daily  aspirin enteric coated 81 milliGRAM(s) Oral daily  atorvastatin 40 milliGRAM(s) Oral at bedtime  clopidogrel Tablet 75 milliGRAM(s) Oral daily  collagenase Ointment 1 Application(s) Topical daily  dextrose 40% Gel 15 Gram(s) Oral once  dextrose 5%. 1000 milliLiter(s) (50 mL/Hr) IV Continuous <Continuous>  dextrose 5%. 1000 milliLiter(s) (100 mL/Hr) IV Continuous <Continuous>  dextrose 50% Injectable 25 Gram(s) IV Push once  dextrose 50% Injectable 12.5 Gram(s) IV Push once  dextrose 50% Injectable 25 Gram(s) IV Push once  diltiazem    milliGRAM(s) Oral at bedtime  glucagon  Injectable 1 milliGRAM(s) IntraMuscular once  heparin  Infusion.  Unit(s)/Hr (24 mL/Hr) IV Continuous <Continuous>  insulin glargine Injectable (LANTUS) 28 Unit(s) SubCutaneous at bedtime  insulin lispro (ADMELOG) corrective regimen sliding scale   SubCutaneous three times a day before meals  insulin lispro (ADMELOG) corrective regimen sliding scale   SubCutaneous at bedtime  insulin lispro Injectable (ADMELOG) 12 Unit(s) SubCutaneous before lunch  insulin lispro Injectable (ADMELOG) 10 Unit(s) SubCutaneous before dinner  insulin lispro Injectable (ADMELOG) 20 Unit(s) SubCutaneous before breakfast  metoprolol succinate  milliGRAM(s) Oral daily  pregabalin 100 milliGRAM(s) Oral three times a day  sodium chloride 0.9%. 1000 milliLiter(s) (60 mL/Hr) IV Continuous <Continuous>      PHYSICAL EXAM:  VITALS: T(C): 36.5 (02-23-22 @ 13:21)  T(F): 97.7 (02-23-22 @ 13:21), Max: 99.7 (02-22-22 @ 20:55)  HR: 70 (02-23-22 @ 13:21) (69 - 82)  BP: 104/65 (02-23-22 @ 13:21) (101/64 - 153/70)  RR:  (18 - 18)  SpO2:  (94% - 98%)  Wt(kg): --  GENERAL: male sitting in bed in NAD  Abdomen: Soft, nontender, non distended  Extremities: Warm, edema  NEURO: A&O X3    LABS:  POCT Blood Glucose.: 172 mg/dL (02-23-22 @ 13:14)  POCT Blood Glucose.: 215 mg/dL (02-23-22 @ 07:51)  POCT Blood Glucose.: 209 mg/dL (02-22-22 @ 21:28)  POCT Blood Glucose.: 210 mg/dL (02-22-22 @ 17:16)  POCT Blood Glucose.: 221 mg/dL (02-22-22 @ 12:41)  POCT Blood Glucose.: 187 mg/dL (02-22-22 @ 08:11)  POCT Blood Glucose.: 173 mg/dL (02-21-22 @ 21:49)  POCT Blood Glucose.: 144 mg/dL (02-21-22 @ 18:24)  POCT Blood Glucose.: 315 mg/dL (02-21-22 @ 13:15)  POCT Blood Glucose.: 174 mg/dL (02-21-22 @ 08:57)  POCT Blood Glucose.: 166 mg/dL (02-20-22 @ 22:35)  POCT Blood Glucose.: 167 mg/dL (02-20-22 @ 18:01)                            13.1   8.40  )-----------( 186      ( 23 Feb 2022 07:02 )             42.8       02-23    135  |  96  |  76<H>  ----------------------------<  180<H>  4.0   |  25  |  2.03<H>    EGFR if : 38<L>  EGFR if non : 33<L>    Ca    9.1      02-23  Mg     2.2     02-21  Phos  5.2     02-21            Thyroid Function Tests:          A1C with Estimated Average Glucose Result: 10.1 % (02-19-22 @ 12:18)      Estimated Average Glucose: 243 mg/dL (02-19-22 @ 12:18)

## 2022-02-23 NOTE — PROGRESS NOTE ADULT - ASSESSMENT
67 year old man with CAD, hx of NSTEMI s/p 3 stents in 2014 (stents to LAD, proximal and distal LCx), ischemic cardiomyopathy, HTN, T2DM c/b peripheral neuropathy, CVA, TIA, Afib on Pradaxa, chronic RLE wound, L carotid stenosis s/p endarterectomy (2014) admitted with acute onset chest pain for one day. Also with SOB. Endocrine consulted for T2DM management.    Uncontrolled T2DM with hyperglycemia  A1c 10.1%  Home regimen: basaglar 25, novolog 10   - Pt with improved glucose control,  postprandial hyperglycemia after breakfast improving  - c/w admelog to 20 units; admelog 12 units before lunch and 10 units before dinner  - Increase Lantus 28 units at bedtime.   (If pt will be NPO for procedure then give 20 units)  - Continue moderate Admelog correction scales  -Goal -180  Discharge plan:  -anticipate discharge on basal/bolus insulin and Trulicity. Patient already has script filled for Trulicity 0.75 mg SQ weekly at home,  and he now agrees to start med.   Can f/u with his home Endocrinologist.      HLD  -Continue atorvastatin 40mg daily    Discussed with patient and RN  Contact via [employer] Teams Tues/Thurs/Friday during business hours  On evenings and weekends, please call [phone] or page endocrine fellow on call.   Please note that this patient may be followed by different provider tomorrow. If no answer, contact endocrine fellow on call [phone] M-F 9a-5pm  [employer] password: [email or web address]      Time spent on encounter: 28  minutes spent on total encounter; The necessity of the time spent during the encounter on this date of service was due to development of plan of care/coordination of care/glycemic control through review of labs, blood glucose values and vital signs.

## 2022-02-23 NOTE — PROGRESS NOTE ADULT - PROBLEM SELECTOR PLAN 1
suspect cardiac in origin given elevated trops and LE edema  -Endorses compliance with Pradaxa as an outpt  -LE edema, Keep O>I as tolerated. Diuretics on hold at this time due to ANT on CKD, pending cardiac cath.   -Pt with hx of hydroPTX. CT chest non contrast with pleural thickening, atelectasis LLL. Will require repeat CT chest in 1-3 months. Findings appear chronic, doubt this is the cause of his acute SOB.   -LE duplex neg DVT   -Normoxic, keep sats >92% with supplemental O2 PRN.  -SOB improving

## 2022-02-23 NOTE — PROGRESS NOTE ADULT - SUBJECTIVE AND OBJECTIVE BOX
Follow-up Pulm Progress Note    No new respiratory events overnight.  Denies SOB/CP.   Sats 96% RA    Medications:  MEDICATIONS  (STANDING):  allopurinol 100 milliGRAM(s) Oral daily  aspirin enteric coated 81 milliGRAM(s) Oral daily  atorvastatin 40 milliGRAM(s) Oral at bedtime  clopidogrel Tablet 75 milliGRAM(s) Oral daily  collagenase Ointment 1 Application(s) Topical daily  dextrose 40% Gel 15 Gram(s) Oral once  dextrose 5%. 1000 milliLiter(s) (50 mL/Hr) IV Continuous <Continuous>  dextrose 5%. 1000 milliLiter(s) (100 mL/Hr) IV Continuous <Continuous>  dextrose 50% Injectable 25 Gram(s) IV Push once  dextrose 50% Injectable 12.5 Gram(s) IV Push once  dextrose 50% Injectable 25 Gram(s) IV Push once  diltiazem    milliGRAM(s) Oral at bedtime  glucagon  Injectable 1 milliGRAM(s) IntraMuscular once  heparin  Infusion.  Unit(s)/Hr (24 mL/Hr) IV Continuous <Continuous>  insulin glargine Injectable (LANTUS) 25 Unit(s) SubCutaneous at bedtime  insulin lispro (ADMELOG) corrective regimen sliding scale   SubCutaneous three times a day before meals  insulin lispro (ADMELOG) corrective regimen sliding scale   SubCutaneous at bedtime  insulin lispro Injectable (ADMELOG) 20 Unit(s) SubCutaneous before breakfast  insulin lispro Injectable (ADMELOG) 12 Unit(s) SubCutaneous before lunch  insulin lispro Injectable (ADMELOG) 10 Unit(s) SubCutaneous before dinner  metoprolol succinate  milliGRAM(s) Oral daily  pregabalin 100 milliGRAM(s) Oral three times a day  sodium chloride 0.9%. 1000 milliLiter(s) (60 mL/Hr) IV Continuous <Continuous>    MEDICATIONS  (PRN):  heparin   Injectable 20305 Unit(s) IV Push every 6 hours PRN For aPTT less than 40  heparin   Injectable 5000 Unit(s) IV Push every 6 hours PRN For aPTT between 40 - 57  oxycodone    5 mG/acetaminophen 325 mG 1 Tablet(s) Oral every 12 hours PRN Severe Pain (7 - 10)          Vital Signs Last 24 Hrs  T(C): 36.5 (23 Feb 2022 09:49), Max: 37.6 (22 Feb 2022 20:55)  T(F): 97.7 (23 Feb 2022 09:49), Max: 99.7 (22 Feb 2022 20:55)  HR: 69 (23 Feb 2022 09:49) (69 - 84)  BP: 126/73 (23 Feb 2022 10:16) (101/64 - 153/70)  BP(mean): --  RR: 18 (23 Feb 2022 09:49) (17 - 18)  SpO2: 96% (23 Feb 2022 09:49) (94% - 98%)          02-22 @ 07:01  -  02-23 @ 07:00  --------------------------------------------------------  IN: 1488 mL / OUT: 3025 mL / NET: -1537 mL          LABS:                        13.1   8.40  )-----------( 186      ( 23 Feb 2022 07:02 )             42.8     02-23    135  |  96  |  76<H>  ----------------------------<  180<H>  4.0   |  25  |  2.03<H>    Ca    9.1      23 Feb 2022 07:27            CAPILLARY BLOOD GLUCOSE      POCT Blood Glucose.: 215 mg/dL (23 Feb 2022 07:51)    PTT - ( 23 Feb 2022 07:02 )  PTT:53.5 sec      Physical Examination:  PULM: Clear to auscultation bilaterally, no significant sputum production  CVS: S1, S2 heard        RADIOLOGY REVIEWED    CT chest: < from: CT Chest No Cont (02.19.22 @ 16:45) >  FINDINGS:    AIRWAYS, LUNGS and PLEURA: Patent central airways. Severe bibasilar   pleural thickening and rounded atelectasis within basilar lower lobes.   Atelectasis of inferior segment of lingula. Scattered small calcified   granulomas. Trace left pleural effusion. No pneumothorax.    MEDIASTINUM AND STEPHON: Numerous subcentimeter mediastinal lymph nodes are   unchanged.    HEART AND VESSELS: Cardiomegaly. Coronary and aortic calcifications. No   pericardial effusion. Thoracic aorta normal in diameter. Dilated   pulmonary arteries suggestive of pulmonary hypertension.    VISUALIZED UPPER ABDOMEN: Bilateral perinephric fat stranding is   unchanged. Aortic calcification.    CHEST WALL AND BONES: No aggressive osseous lesion.    LOWER NECK: Within normal limits.    IMPRESSION:    Severe bibasilar pleural thickening and rounded atelectasis within   basilar lower lobes. Atelectasis of inferior segment of lingula. Trace   left pleural effusion. No pneumothorax.    < end of copied text >

## 2022-02-23 NOTE — PROGRESS NOTE ADULT - ASSESSMENT
67M with PMH of CAD, NSTEMI s/p 3 stents in 2014 (stents to LAD, proximal and distal LCx), ischemic cardiomyopathy, HTN, T2DM c/b peripheral neuropathy, CVA, TIA, Afib on Pradaxa, chronic RLE wound, L carotid stenosis s/p endarterectomy (2014) presents to the ED with acute onset chest pain and sob     Problem/Plan - 1:  ·  Problem: Chest pain.   ·  Plan: Patient presents with atypical chest pain, possibly 2/2 Afib. Chest pain now resolved  - cardiology recs appreciated  c/w meds  - ischemic eval per cardiology./cath>today     Problem/Plan - 2:  ·  Problem: Cardiac arrhythmia.  ·  Plan: Patient had a run of VT on tele however asymptomatic  - currently Afib on tele  -c/w meds     Problem/Plan - 3:  ·  Problem: CAD (coronary artery disease).  ·  Plan: Patient has a history of CAD s/p 3 stents  c/w meds  chf hx       ·  Problem: Carotid stenosis.  ·  Plan: Patient has a history of carotic stenosis  - MRA showed greater than 75% stenosis of the R distal common carotic a, 60% stenosis of the prximal L internal carotid a  - continue aspirin qd  - continue statin qd.     Problem/Plan - 6:  ·  Problem: Cardiomyopathy, ischemic.  ·  Plan: Patient has a history of CHF  - continue diuresis with bumex, metolazone  - strict Is&Os  - daily weights  - continue aspirin, beta blocker, statin.      ·  Problem: Chronic kidney disease, unspecified CKD stage  renal f/u  alex better/creatinine at baseline  - continue to monitor  - strict Is&Os.    ·  Problem: Afib.  ·  Plan: - continue pradaxa BID  - continue metoprolol 100mg qd.      ·  Problem: Hypertension.c/w meds  - monitor blood pressures closely.      ·  Problem: T2DM (type 2 diabetes mellitus).c/w tx  - monitor blood glucose closely in the setting of CKD.    ·  Problem: Preventive measure.   ·  Plan: - DVT ppx: Pradaxa  - Diet: DASH/CC    wound care for foot noted

## 2022-02-23 NOTE — PROVIDER CONTACT NOTE (OTHER) - ACTION/TREATMENT ORDERED:
SUSAN Ramirez aware. States to leave the IVF NS @60mL/hr to the next shift and she will pass on the message to day team. As per PA, the team wishes to check pt's AM labs before taking pt for angiogram.

## 2022-02-23 NOTE — PROGRESS NOTE ADULT - PROBLEM SELECTOR PLAN 2
NSTEMI, CAD, Elevated troponin  -Plavix, asa, heparin gtt per cards   -TTE with EF 60%   -Plans for cardiac cath today

## 2022-02-23 NOTE — PROGRESS NOTE ADULT - SUBJECTIVE AND OBJECTIVE BOX
CARDIOLOGY FOLLOW UP - Dr. Mazariegos  Date of Service: 2/23/22  CC: denies cp, sob, and palpitations     Review of Systems:  Constitutional: No fever, weight loss, or fatigue  Respiratory: No cough, wheezing, or hemoptysis, no shortness of breath  Cardiovascular: No chest pain, palpitations, passing out, dizziness, or leg swelling  Gastrointestinal: No abd or epigastric pain.  No nausea, vomiting, or hematemesis; no diarrhea or constipation, no melena or hematochezia  Vascular: no edema       PHYSICAL EXAM:  T(C): 36.5 (02-23-22 @ 09:49), Max: 37.6 (02-22-22 @ 20:55)  HR: 69 (02-23-22 @ 09:49) (69 - 84)  BP: 126/73 (02-23-22 @ 10:16) (101/64 - 153/70)  RR: 18 (02-23-22 @ 09:49) (17 - 18)  SpO2: 96% (02-23-22 @ 09:49) (94% - 98%)  Wt(kg): --  I&O's Summary    22 Feb 2022 07:01  -  23 Feb 2022 07:00  --------------------------------------------------------  IN: 1488 mL / OUT: 3025 mL / NET: -1537 mL    23 Feb 2022 07:01  -  23 Feb 2022 12:47  --------------------------------------------------------  IN: 200 mL / OUT: 0 mL / NET: 200 mL        Appearance: Normal	  Cardiovascular:  irreg , No JVD, No murmurs  Respiratory: Lungs clear to auscultation	  Gastrointestinal:  Soft, Non-tender, + BS	  Extremities: Normal range of motion, No clubbing, cyanosis or edema      Home Medications:  acetaminophen 325 mg oral tablet: 2 tab(s) orally every 6 hours, As needed, Temp greater or equal to 38C (100.4F), Mild Pain (1 - 3) (18 Feb 2022 21:56)  allopurinol 100 mg oral tablet: 1 tab(s) orally once a day (18 Feb 2022 21:56)  aspirin 81 mg oral delayed release tablet: 1 tab(s) orally once a day (18 Feb 2022 21:56)  bumetanide 2 mg oral tablet: 1 tab(s) orally once a day (18 Feb 2022 21:56)  insulin glargine 100 units/mL subcutaneous solution: 25 unit(s) subcutaneous once a day (18 Feb 2022 21:56)  metOLazone 5 mg oral tablet: 1 tab(s) orally 3 times a week (18 Feb 2022 21:56)  metoprolol succinate 50 mg oral tablet, extended release: 2 tab(s) orally once a day (18 Feb 2022 21:56)  NovoLOG FlexPen 100 units/mL injectable solution: 10 unit(s) subcutaneous 3 times a day (18 Feb 2022 21:56)  oxycodone-acetaminophen 5 mg-325 mg oral tablet: 1 tab(s) orally 2 times a day (18 Feb 2022 21:56)  Pradaxa 150 mg oral capsule: 1 cap(s) orally 2 times a day (18 Feb 2022 21:56)  pregabalin 100 mg oral capsule: 1 cap(s) orally 3 times a day (18 Feb 2022 21:56)      MEDICATIONS  (STANDING):  allopurinol 100 milliGRAM(s) Oral daily  aspirin enteric coated 81 milliGRAM(s) Oral daily  atorvastatin 40 milliGRAM(s) Oral at bedtime  clopidogrel Tablet 75 milliGRAM(s) Oral daily  collagenase Ointment 1 Application(s) Topical daily  dextrose 40% Gel 15 Gram(s) Oral once  dextrose 5%. 1000 milliLiter(s) (50 mL/Hr) IV Continuous <Continuous>  dextrose 5%. 1000 milliLiter(s) (100 mL/Hr) IV Continuous <Continuous>  dextrose 50% Injectable 25 Gram(s) IV Push once  dextrose 50% Injectable 12.5 Gram(s) IV Push once  dextrose 50% Injectable 25 Gram(s) IV Push once  diltiazem    milliGRAM(s) Oral at bedtime  glucagon  Injectable 1 milliGRAM(s) IntraMuscular once  heparin  Infusion.  Unit(s)/Hr (24 mL/Hr) IV Continuous <Continuous>  insulin glargine Injectable (LANTUS) 25 Unit(s) SubCutaneous at bedtime  insulin lispro (ADMELOG) corrective regimen sliding scale   SubCutaneous three times a day before meals  insulin lispro (ADMELOG) corrective regimen sliding scale   SubCutaneous at bedtime  insulin lispro Injectable (ADMELOG) 20 Unit(s) SubCutaneous before breakfast  insulin lispro Injectable (ADMELOG) 12 Unit(s) SubCutaneous before lunch  insulin lispro Injectable (ADMELOG) 10 Unit(s) SubCutaneous before dinner  metoprolol succinate  milliGRAM(s) Oral daily  pregabalin 100 milliGRAM(s) Oral three times a day  sodium chloride 0.9%. 1000 milliLiter(s) (60 mL/Hr) IV Continuous <Continuous>      TELEMETRY: afib 70-90s 	    ECG:  	  RADIOLOGY:   DIAGNOSTIC TESTING:   [ ] Echocardiogram: < from: TTE with Doppler (w/Cont) (02.22.22 @ 10:58) >  INDICATION: Chest pain, unspecified (R07.9)  ------------------------------------------------------------------------  Dimensions:    Normal Values:  LA:     4.4    2.0 - 4.0 cm  Ao:     4.0    2.0 - 3.8 cm  SEPTUM: 1.1    0.6 - 1.2 cm  PWT:    0.9    0.6 - 1.1 cm  LVIDd:  5.0    3.0 - 5.6 cm  LVIDs:  3.5    1.8 - 4.0 cm  Derived variables:  LVMI: 73 g/m2  RWT: 0.36  EF (Visual Estimate): 60 %  Doppler Peak Velocity (m/sec): AoV=1.5 TV=2.2  ------------------------------------------------------------------------  Observations:  Mitral Valve: Normal mitral valve.  Aortic Valve/Aorta: Calcified aortic valve with normal  opening. Minimal aortic regurgitation.  Normal aortic root size.  Left Atrium: Mildly dilated left atrium.  Left Ventricle: Endocardial visualization enhanced with  intravenous injection of Ultrasonic Enhancing Agent  (Definity).  Normal left ventricular internal dimensions and wall  thickness.  Normal left ventricular systolic function. No segmental  wall motion abnormalities.  Right Heart: Normal right atrium. Normal right ventricular  size and function.  Normal tricuspid valve. Mild tricuspid regurgitation.  Normal pulmonic valve. Mild pulmonic regurgitation.  Pericardium/Pleura: Normal pericardium with no pericardial  effusion.  Hemodynamic: No evidenceof pulmonary hypertension.  ------------------------------------------------------------------------  Conclusions:  Endocardial visualization enhanced with intravenous  injection of Ultrasonic Enhancing Agent (Definity).  Normal left ventricular systolic function. No segmental  wall motion abnormalities.    < end of copied text >    [ ]  Catheterization:  [ ] Stress Test:    OTHER: 	    LABS:	 	    Troponin T, High Sensitivity Result: 2016 ng/L [0 - 51] (02-20 @ 12:23)  Creatine Kinase, Serum: 88 U/L [30 - 200] (02-20 @ 12:23)  CKMB Units: 8.1 ng/mL [0.0 - 6.7] (02-20 @ 12:23)  Creatine Kinase, Serum: 123 U/L [30 - 200] (02-20 @ 02:09)  Creatine Kinase, Serum: 154 U/L [30 - 200] (02-19 @ 16:39)  Troponin T, High Sensitivity Result: 1192 ng/L [0 - 51] (02-19 @ 16:39)  Creatine Kinase, Serum: 226 U/L [30 - 200] (02-19 @ 07:17)  CKMB Units: 23.2 ng/mL [0.0 - 6.7] (02-19 @ 07:17)  Troponin T, High Sensitivity Result: 1127 ng/L [0 - 51] (02-19 @ 07:17)  Creatine Kinase, Serum: 270 U/L [30 - 200] (02-19 @ 00:12)  CKMB Units: 30.3 ng/mL [0.0 - 6.7] (02-19 @ 00:12)  Troponin T, High Sensitivity Result: 971 ng/L [0 - 51] (02-19 @ 00:12)  Troponin T, High Sensitivity Result: 932 ng/L [0 - 51] (02-18 @ 17:23)                          13.1   8.40  )-----------( 186      ( 23 Feb 2022 07:02 )             42.8     02-23    135  |  96  |  76<H>  ----------------------------<  180<H>  4.0   |  25  |  2.03<H>    Ca    9.1      23 Feb 2022 07:27      PTT - ( 23 Feb 2022 07:02 )  PTT:53.5 sec

## 2022-02-23 NOTE — PROVIDER CONTACT NOTE (OTHER) - ASSESSMENT
Pt AOx4, sleeping at this time. Currently on heparin gtt @17mL/hr. Cath lab contacted, pt is not on the current schedule for angiogram tomorrow.

## 2022-02-24 NOTE — PROGRESS NOTE ADULT - ASSESSMENT
68 y/o M with PMH of pleural effusion 2019 - s/p R thoracentesis then R chest tube placement with course c/b R hydroPTX - tx to [facility] for possible VATS decortication which was deferred, CAD, NSTEMI s/p 3 stents in 2014 (stents to LAD, proximal and distal LCx), ischemic cardiomyopathy, HTN, T2DM c/b peripheral neuropathy, CVA, TIA, Afib on Pradaxa, chronic RLE wound, L carotid stenosis s/p endarterectomy (2014). Presents to ED with acute onset chest pain, SOB for one day - +NSTEMI.

## 2022-02-24 NOTE — PROGRESS NOTE ADULT - SUBJECTIVE AND OBJECTIVE BOX
CARDIOLOGY FOLLOW UP - Dr. Mazariegos  Date of Service: 2/24/22  CC: denies cp, sob, and palpitations  feeling better     Review of Systems:  Constitutional: No fever, weight loss, or fatigue  Respiratory: No cough, wheezing, or hemoptysis, no shortness of breath  Cardiovascular: No chest pain, palpitations, passing out, dizziness, or leg swelling  Gastrointestinal: No abd or epigastric pain.  No nausea, vomiting, or hematemesis; no diarrhea or constipation, no melena or hematochezia  Vascular: no edema       PHYSICAL EXAM:  T(C): 36.4 (02-24-22 @ 09:51), Max: 36.7 (02-24-22 @ 01:06)  HR: 72 (02-24-22 @ 09:51) (62 - 85)  BP: 137/59 (02-24-22 @ 09:51) (104/65 - 165/74)  RR: 16 (02-24-22 @ 09:51) (16 - 18)  SpO2: 98% (02-24-22 @ 09:51) (93% - 98%)  Wt(kg): --  I&O's Summary    23 Feb 2022 07:01  -  24 Feb 2022 07:00  --------------------------------------------------------  IN: 1080 mL / OUT: 2075 mL / NET: -995 mL    24 Feb 2022 07:01  -  24 Feb 2022 12:44  --------------------------------------------------------  IN: 300 mL / OUT: 700 mL / NET: -400 mL        Appearance: Normal	  Cardiovascular: Normal S1 S2,RRR, No JVD, No murmurs  Respiratory: Lungs clear to auscultation	  Gastrointestinal:  Soft, Non-tender, + BS	  Extremities: Normal range of motion, No clubbing, cyanosis or edema      Home Medications:  acetaminophen 325 mg oral tablet: 2 tab(s) orally every 6 hours, As needed, Temp greater or equal to 38C (100.4F), Mild Pain (1 - 3) (18 Feb 2022 21:56)  allopurinol 100 mg oral tablet: 1 tab(s) orally once a day (18 Feb 2022 21:56)  aspirin 81 mg oral delayed release tablet: 1 tab(s) orally once a day (18 Feb 2022 21:56)  bumetanide 2 mg oral tablet: 1 tab(s) orally once a day (18 Feb 2022 21:56)  insulin glargine 100 units/mL subcutaneous solution: 25 unit(s) subcutaneous once a day (18 Feb 2022 21:56)  metOLazone 5 mg oral tablet: 1 tab(s) orally 3 times a week (18 Feb 2022 21:56)  metoprolol succinate 50 mg oral tablet, extended release: 2 tab(s) orally once a day (18 Feb 2022 21:56)  NovoLOG FlexPen 100 units/mL injectable solution: 10 unit(s) subcutaneous 3 times a day (18 Feb 2022 21:56)  oxycodone-acetaminophen 5 mg-325 mg oral tablet: 1 tab(s) orally 2 times a day (18 Feb 2022 21:56)  Pradaxa 150 mg oral capsule: 1 cap(s) orally 2 times a day (18 Feb 2022 21:56)  pregabalin 100 mg oral capsule: 1 cap(s) orally 3 times a day (18 Feb 2022 21:56)      MEDICATIONS  (STANDING):  allopurinol 100 milliGRAM(s) Oral daily  aspirin enteric coated 81 milliGRAM(s) Oral daily  atorvastatin 40 milliGRAM(s) Oral at bedtime  clopidogrel Tablet 75 milliGRAM(s) Oral daily  collagenase Ointment 1 Application(s) Topical daily  dabigatran 150 milliGRAM(s) Oral every 12 hours  dextrose 40% Gel 15 Gram(s) Oral once  dextrose 5% + sodium chloride 0.9%. 1000 milliLiter(s) (50 mL/Hr) IV Continuous <Continuous>  dextrose 5%. 1000 milliLiter(s) (50 mL/Hr) IV Continuous <Continuous>  dextrose 5%. 1000 milliLiter(s) (100 mL/Hr) IV Continuous <Continuous>  dextrose 50% Injectable 25 Gram(s) IV Push once  dextrose 50% Injectable 12.5 Gram(s) IV Push once  dextrose 50% Injectable 25 Gram(s) IV Push once  diltiazem    milliGRAM(s) Oral at bedtime  glucagon  Injectable 1 milliGRAM(s) IntraMuscular once  insulin glargine Injectable (LANTUS) 28 Unit(s) SubCutaneous at bedtime  insulin lispro (ADMELOG) corrective regimen sliding scale   SubCutaneous three times a day before meals  insulin lispro (ADMELOG) corrective regimen sliding scale   SubCutaneous at bedtime  insulin lispro Injectable (ADMELOG) 20 Unit(s) SubCutaneous before breakfast  insulin lispro Injectable (ADMELOG) 12 Unit(s) SubCutaneous before lunch  insulin lispro Injectable (ADMELOG) 10 Unit(s) SubCutaneous before dinner  metoprolol succinate  milliGRAM(s) Oral daily  pregabalin 100 milliGRAM(s) Oral three times a day  sodium chloride 0.9%. 1000 milliLiter(s) (75 mL/Hr) IV Continuous <Continuous>      TELEMETRY: 	afib 70-90s     ECG:  	  RADIOLOGY:   DIAGNOSTIC TESTING:  [ ] Echocardiogram:  [ ]  Catheterization: < from: Cardiac Catheterization (02.23.22 @ 17:05) >  Conclusions:   Coronary Angiography   LAD   Left anterior descending artery: There is an 85 % stenosis inthe  proximal third portion of the segment just proximal to previous  stent.      NSTEMI. Known history of CAD, s/p LAD, proximal and distal LCX stents.  Cath today with patent mid LAD and proximal LCx  stents. Severe focal proximal LAD lesion just proximal to previous LAD  stent. Distal LCX stent occluded with faint collaterals  form LAD/diagonal. LV function overall preserved on TTE.    Recommendations:     PCI with LULU to severe proximal LAD lesion in setting of NSTEMI, acute  CHF. Continue ASA and plavix daily (will complete 1  month course of plavix in setting of triple therapy). Resume pradaxa  6-8 hours post radial band removal for chronic AF. Gentle  hydration.    General Impressions:   General Diagnostic:      General Diagnostic Impressions     There is mild 3-vessel coronary artery disease (LAD, Circumflex and  RCA).    Procedure Narrative:   The risks and alternatives of the procedures and conscious sedation  were explained to the patient and informed consent was  obtained. Thepatient was brought to the cath lab and placed on the  exam table.  Access   Right radial artery:   The puncture site was infiltrated with 1% Lidocaine. Vascular access  was obtained using modified seldinger technique.    Patient: DYLAN DEL CASTILLO      MRN: [id number]  Study Date: 02/23/2022   05:05 PM      Page 1 of 5          Diagnostic Findings:     Coronary Angiography   The coronary circulation is left dominant.      LM   Left main artery: Angiography shows minor irregularities.      LAD   Left anterior descending artery: mid stent patent with no restenosis.  There is an 85 % stenosis in the proximal third portion of  the segment just proximal to previous stent. First diagonal:  Angiography shows mild atherosclerosis.    CX   Circumflex: Angiography shows minor irregularities. proximal stent  patnet with no significant restenosis. . First obtuse  marginal: Angiography shows mild atherosclerosis. Distal circumflex:  Angiography shows complete occlusion. vessel occluded  at site of previous distal LCx stent just distal to large OM1 branch.  Distal vessel is filled via left to left collaterals. .    RCA   Right coronary artery: The vessel was not injected but was visualized  during a prior cardiac catheterization. The segment is  small. Angiography shows no disease. small nondominant insignificant  vessel, not selectively visualzied to minimize contrast  load. .      Left Heart Cath   Left ventricular function was not assessed. LV to AO pullback was not  performed.      < end of copied text >    [ ] Stress Test:    OTHER: 	    LABS:	 	    Troponin T, High Sensitivity Result: 2016 ng/L [0 - 51] (02-20 @ 12:23)  Creatine Kinase, Serum: 88 U/L [30 - 200] (02-20 @ 12:23)  CKMB Units: 8.1 ng/mL [0.0 - 6.7] (02-20 @ 12:23)  Creatine Kinase, Serum: 123 U/L [30 - 200] (02-20 @ 02:09)  Creatine Kinase, Serum: 154 U/L [30 - 200] (02-19 @ 16:39)  Troponin T, High Sensitivity Result: 1192 ng/L [0 - 51] (02-19 @ 16:39)  Creatine Kinase, Serum: 226 U/L [30 - 200] (02-19 @ 07:17)  CKMB Units: 23.2 ng/mL [0.0 - 6.7] (02-19 @ 07:17)  Troponin T, High Sensitivity Result: 1127 ng/L [0 - 51] (02-19 @ 07:17)  Creatine Kinase, Serum: 270 U/L [30 - 200] (02-19 @ 00:12)  CKMB Units: 30.3 ng/mL [0.0 - 6.7] (02-19 @ 00:12)  Troponin T, High Sensitivity Result: 971 ng/L [0 - 51] (02-19 @ 00:12)  Troponin T, High Sensitivity Result: 932 ng/L [0 - 51] (02-18 @ 17:23)                          13.0   8.55  )-----------( 222      ( 24 Feb 2022 07:31 )             42.8     02-24    133<L>  |  97  |  70<H>  ----------------------------<  244<H>  4.2   |  23  |  2.12<H>    Ca    9.0      24 Feb 2022 07:28  Mg     2.4     02-24      PTT - ( 23 Feb 2022 22:53 )  PTT:35.6 sec

## 2022-02-24 NOTE — PROGRESS NOTE ADULT - SUBJECTIVE AND OBJECTIVE BOX
[facility]   [phone]  RENAL FOLLOW UP NOTE  --------------------------------------------------------------------------------  Chief Complaint:    24 hour events/subjective:    Patient seen and examined.   denies sob, chest pain    PAST HISTORY  --------------------------------------------------------------------------------  No significant changes to PMH, PSH, FHx, SHx, unless otherwise noted    ALLERGIES & MEDICATIONS  --------------------------------------------------------------------------------  Allergies    No Known Allergies    Intolerances      Standing Inpatient Medications  allopurinol 100 milliGRAM(s) Oral daily  aspirin enteric coated 81 milliGRAM(s) Oral daily  atorvastatin 40 milliGRAM(s) Oral at bedtime  clopidogrel Tablet 75 milliGRAM(s) Oral daily  collagenase Ointment 1 Application(s) Topical daily  dabigatran 150 milliGRAM(s) Oral every 12 hours  dextrose 40% Gel 15 Gram(s) Oral once  dextrose 5% + sodium chloride 0.9%. 1000 milliLiter(s) IV Continuous <Continuous>  dextrose 5%. 1000 milliLiter(s) IV Continuous <Continuous>  dextrose 5%. 1000 milliLiter(s) IV Continuous <Continuous>  dextrose 50% Injectable 25 Gram(s) IV Push once  dextrose 50% Injectable 12.5 Gram(s) IV Push once  dextrose 50% Injectable 25 Gram(s) IV Push once  diltiazem    milliGRAM(s) Oral at bedtime  glucagon  Injectable 1 milliGRAM(s) IntraMuscular once  insulin glargine Injectable (LANTUS) 30 Unit(s) SubCutaneous at bedtime  insulin lispro (ADMELOG) corrective regimen sliding scale   SubCutaneous three times a day before meals  insulin lispro (ADMELOG) corrective regimen sliding scale   SubCutaneous at bedtime  insulin lispro Injectable (ADMELOG) 12 Unit(s) SubCutaneous before lunch  insulin lispro Injectable (ADMELOG) 10 Unit(s) SubCutaneous before dinner  metoprolol succinate  milliGRAM(s) Oral daily  pregabalin 100 milliGRAM(s) Oral three times a day  sodium chloride 0.9%. 1000 milliLiter(s) IV Continuous <Continuous>    PRN Inpatient Medications  oxycodone    5 mG/acetaminophen 325 mG 1 Tablet(s) Oral every 12 hours PRN      REVIEW OF SYSTEMS  --------------------------------------------------------------------------------    Gen: denies fevers/chills,  CVS: denies chest pain/palpitations  Resp: denies SOB/Cough  GI: Denies N/V/Abd pain  : Denies dysuria/oliguria/hematuria    All other systems were reviewed and are negative, except as noted.    VITALS/PHYSICAL EXAM  --------------------------------------------------------------------------------  T(C): 36.9 (02-24-22 @ 13:32), Max: 36.9 (02-24-22 @ 13:32)  HR: 81 (02-24-22 @ 13:32) (62 - 85)  BP: 150/75 (02-24-22 @ 13:32) (135/63 - 165/74)  RR: 16 (02-24-22 @ 13:32) (16 - 18)  SpO2: 99% (02-24-22 @ 13:32) (93% - 99%)  Wt(kg): --        02-23-22 @ 07:01  -  02-24-22 @ 07:00  --------------------------------------------------------  IN: 1080 mL / OUT: 2075 mL / NET: -995 mL    02-24-22 @ 07:01  -  02-24-22 @ 14:55  --------------------------------------------------------  IN: 540 mL / OUT: 900 mL / NET: -360 mL      Physical Exam:  	          Gen: alert and oriented, Appears comfortable          Lungs: decrease bs no rales or ronchi or wheezing          Heart: No JVD, S1S2          Abd: +BS, soft, non tender,           LE: B/L 2+ edema, RLE wound, c/d    LABS/STUDIES  --------------------------------------------------------------------------------              13.0   8.55  >-----------<  222      [02-24-22 @ 07:31]              42.8     133  |  97  |  70  ----------------------------<  244      [02-24-22 @ 07:28]  4.2   |  23  |  2.12        Ca     9.0     [02-24-22 @ 07:28]      Mg     2.4     [02-24-22 @ 07:28]        PTT: 35.6       [02-23-22 @ 22:53]      Creatinine Trend:  SCr 2.12 [02-24 @ 07:28]  SCr 2.03 [02-23 @ 07:27]  SCr 2.42 [02-22 @ 07:24]  SCr 3.00 [02-21 @ 06:21]  SCr 2.89 [02-20 @ 12:23]              Urinalysis - [02-19-22 @ 23:07]      Color Light Yellow / Appearance Clear / SG 1.012 / pH 6.0      Gluc Negative / Ketone Negative  / Bili Negative / Urobili Negative       Blood Negative / Protein 30 mg/dL / Leuk Est Negative / Nitrite Negative      RBC 2 / WBC 1 / Hyaline 12 / Gran  / Sq Epi  / Non Sq Epi 1 / Bacteria Negative    Urine Creatinine 73      [02-19-22 @ 23:08]  Urine Protein 61      [02-19-22 @ 23:08]  Urine Sodium 55      [02-19-22 @ 23:08]  Urine Urea Nitrogen 177      [02-20-22 @ 04:44]  Urine Chloride 64      [02-19-22 @ 23:08]    HbA1c 11.7      [11-07-19 @ 09:14]

## 2022-02-24 NOTE — PROGRESS NOTE ADULT - ASSESSMENT
67M with PMH of CAD, NSTEMI s/p 3 stents in 2014 (stents to LAD, proximal and distal LCx), ischemic cardiomyopathy, HTN, T2DM c/b peripheral neuropathy, CVA, TIA, Afib on Pradaxa, chronic RLE wound, L carotid stenosis s/p endarterectomy (2014) presents to the ED with acute onset chest pain and sob     Problem/Plan - 1:  ·  Problem: Chest pain. resolved  ·  Plan: Patient presents with atypical chest pain, possibly 2/2 Afib.   - cardiology recs appreciated  c/w meds  s/p cath w. stent     Problem/Plan - 2:  ·  Problem: Cardiac arrhythmia.  ·  Plan: Patient had a run of VT on tele however asymptomatic  -c/w meds     Problem/Plan - 3:  ·  Problem: CAD (coronary artery disease).  ·  Plan: Patient has a history of CAD s/p 3 stents  c/w meds  chf hx       ·  Problem: Carotid stenosis.  ·  Plan: Patient has a history of carotic stenosis  - MRA showed greater than 75% stenosis of the R distal common carotic a, 60% stenosis of the prximal L internal carotid a  - continue aspirin qd  - continue statin qd.     Problem/Plan - 6:  ·  Problem: Cardiomyopathy, ischemic.  ·  Plan: Patient has a history of CHF  - continue diuresis with bumex, metolazone  - strict Is&Os  - daily weights  - continue aspirin, beta blocker, statin.      ·  Problem: Chronic kidney disease, unspecified CKD stage  renal f/u  alex better/creatinine at baseline  - continue to monitor  - strict Is&Os.    ·  Problem: Afib.  ·  Plan: - continue pradaxa BID  - continue metoprolol 100mg qd.      ·  Problem: Hypertension.c/w meds  - monitor blood pressures closely.      ·  Problem: T2DM (type 2 diabetes mellitus).c/w tx  - monitor blood glucose closely in the setting of CKD.    ·  Problem: Preventive measure.   ·  Plan: - DVT ppx: Pradaxa  - Diet: DASH/CC    wound care for foot noted

## 2022-02-24 NOTE — PROGRESS NOTE ADULT - ASSESSMENT
Mr. Stevens is an 67 year old gentleman with PMH of CAD, NSTEMI s/p 3 stents in 2014 (stents to LAD, proximal and distal LCx), ischemic cardiomyopathy, HTN, T2DM c/b peripheral neuropathy, CVA, TIA, Afib on Pradaxa, chronic RLE wound, L carotid stenosis s/p endarterectomy (2014) presents to the ED with acute onset chest pain and sob.     1. Now ANT on CKD  2.  CHF    3. Electrolytes are acceptable.   4. Elevated bicarb in the setting of possible CO2 retention.   5. Cardiomyopathy defer to primary  6. Proteinuria   7- htn hx     ant in setting of requiring diuretics for chf  creatinine steady  s/p cardiace cath, 54 cc contrast  if creatinine stable, plan to restart bumex in am   cont cardizem cd 120 mg daily   trend creatinine in am

## 2022-02-24 NOTE — PROGRESS NOTE ADULT - PROBLEM SELECTOR PLAN 2
NSTEMI, CAD, Elevated troponin  -Plavix, asa, heparin gtt per cards   -TTE with EF 60%   -S/p cath 2/23 85% stenosis proximal LAD - balloon angioplasty and LULU  -No further c/o CP

## 2022-02-24 NOTE — PROGRESS NOTE ADULT - PROBLEM SELECTOR PLAN 1
suspect cardiac in origin given elevated trops and LE edema  -Endorses compliance with Pradaxa as an outpt  -LE edema, Keep O>I as tolerated. Diuretics on hold at this time due to ANT on CKD, pending cardiac cath.   -Pt with hx of hydroPTX. CT chest non contrast with pleural thickening, atelectasis LLL. Will require repeat CT chest in 1-3 months. Findings appear chronic, doubt this is the cause of his acute SOB.   -LE duplex neg DVT   -Normoxic, keep sats >92% with supplemental O2 PRN.  -SOB resolved suspect cardiac in origin given elevated trops and LE edema  -Endorses compliance with Pradaxa as an outpt  -LE edema, Keep O>I as tolerated. Diuretics on hold at this time s/p cath and due to ANT on CKD. Eventually resume diuretics.   -Pt with hx of hydroPTX. CT chest non contrast with pleural thickening, atelectasis LLL. Will require repeat CT chest in 1-3 months. Findings appear chronic, doubt this is the cause of his acute SOB.   -LE duplex neg DVT   -Normoxic, keep sats >92% with supplemental O2 PRN.  -SOB resolved

## 2022-02-24 NOTE — PROGRESS NOTE ADULT - SUBJECTIVE AND OBJECTIVE BOX
Follow-up Pulm Progress Note    S/p cardiac cath yesterday afternoon   No new respiratory events overnight.  Denies SOB/CP.     Medications:  MEDICATIONS  (STANDING):  allopurinol 100 milliGRAM(s) Oral daily  aspirin enteric coated 81 milliGRAM(s) Oral daily  atorvastatin 40 milliGRAM(s) Oral at bedtime  clopidogrel Tablet 75 milliGRAM(s) Oral daily  collagenase Ointment 1 Application(s) Topical daily  dabigatran 150 milliGRAM(s) Oral every 12 hours  dextrose 40% Gel 15 Gram(s) Oral once  dextrose 5% + sodium chloride 0.9%. 1000 milliLiter(s) (50 mL/Hr) IV Continuous <Continuous>  dextrose 5%. 1000 milliLiter(s) (50 mL/Hr) IV Continuous <Continuous>  dextrose 5%. 1000 milliLiter(s) (100 mL/Hr) IV Continuous <Continuous>  dextrose 50% Injectable 25 Gram(s) IV Push once  dextrose 50% Injectable 12.5 Gram(s) IV Push once  dextrose 50% Injectable 25 Gram(s) IV Push once  diltiazem    milliGRAM(s) Oral at bedtime  glucagon  Injectable 1 milliGRAM(s) IntraMuscular once  insulin glargine Injectable (LANTUS) 28 Unit(s) SubCutaneous at bedtime  insulin lispro (ADMELOG) corrective regimen sliding scale   SubCutaneous three times a day before meals  insulin lispro (ADMELOG) corrective regimen sliding scale   SubCutaneous at bedtime  insulin lispro Injectable (ADMELOG) 20 Unit(s) SubCutaneous before breakfast  insulin lispro Injectable (ADMELOG) 12 Unit(s) SubCutaneous before lunch  insulin lispro Injectable (ADMELOG) 10 Unit(s) SubCutaneous before dinner  metoprolol succinate  milliGRAM(s) Oral daily  pregabalin 100 milliGRAM(s) Oral three times a day  sodium chloride 0.9%. 1000 milliLiter(s) (75 mL/Hr) IV Continuous <Continuous>    MEDICATIONS  (PRN):  oxycodone    5 mG/acetaminophen 325 mG 1 Tablet(s) Oral every 12 hours PRN Severe Pain (7 - 10)          Vital Signs Last 24 Hrs  T(C): 36.4 (24 Feb 2022 09:51), Max: 36.7 (24 Feb 2022 01:06)  T(F): 97.6 (24 Feb 2022 09:51), Max: 98 (24 Feb 2022 01:06)  HR: 72 (24 Feb 2022 09:51) (62 - 85)  BP: 137/59 (24 Feb 2022 09:51) (104/65 - 165/74)  BP(mean): --  RR: 16 (24 Feb 2022 09:51) (16 - 18)  SpO2: 98% (24 Feb 2022 09:51) (93% - 98%)          02-23 @ 07:01  -  02-24 @ 07:00  --------------------------------------------------------  IN: 1080 mL / OUT: 2075 mL / NET: -995 mL          LABS:                        13.0   8.55  )-----------( 222      ( 24 Feb 2022 07:31 )             42.8     02-24    133<L>  |  97  |  70<H>  ----------------------------<  244<H>  4.2   |  23  |  2.12<H>    Ca    9.0      24 Feb 2022 07:28  Mg     2.4     02-24            CAPILLARY BLOOD GLUCOSE      POCT Blood Glucose.: 197 mg/dL (24 Feb 2022 08:37)    PTT - ( 23 Feb 2022 22:53 )  PTT:35.6 sec            Physical Examination:  PULM: Clear to auscultation bilaterally, no significant sputum production  CVS: S1, S2 heard        RADIOLOGY REVIEWED    CT chest: < from: CT Chest No Cont (02.19.22 @ 16:45) >  FINDINGS:    AIRWAYS, LUNGS and PLEURA: Patent central airways. Severe bibasilar   pleural thickening and rounded atelectasis within basilar lower lobes.   Atelectasis of inferior segment of lingula. Scattered small calcified   granulomas. Trace left pleural effusion. No pneumothorax.    MEDIASTINUM AND STEPHON: Numerous subcentimeter mediastinal lymph nodes are   unchanged.    HEART AND VESSELS: Cardiomegaly. Coronary and aortic calcifications. No   pericardial effusion. Thoracic aorta normal in diameter. Dilated   pulmonary arteries suggestive of pulmonary hypertension.    VISUALIZED UPPER ABDOMEN: Bilateral perinephric fat stranding is   unchanged. Aortic calcification.    CHEST WALL AND BONES: No aggressive osseous lesion.    LOWER NECK: Within normal limits.    IMPRESSION:    Severe bibasilar pleural thickening and rounded atelectasis within   basilar lower lobes. Atelectasis of inferior segment of lingula. Trace   left pleural effusion. No pneumothorax.    < end of copied text >

## 2022-02-24 NOTE — PROGRESS NOTE ADULT - ASSESSMENT
LHC 2/23/22: 85% proximal LAD s/p balloon angioplasty and LULU  Echo 2/22/22: EF 60%, nl lv sys fx, mild TR, mild Pr  Echo 10/17/19: EF 45%, Grossly borderline left ventricular systolic dysfunction.   ** Compared with echocardiogram of 12/29/2018, no significant changes noted (reported LVEF in last study is slightly lower, but atrial fibrillation makes it difficult to assess ejection fraction).    a/p  68 y/o male with a past medical history of HTN, DMT2 c/b LLE neuropathy, CVA/TIA, Afib (on Pradaxa , MI, CAD (NSTEMI) x 3 stents in 2014 to LAD, and proximal and distal LCx, ischemic cardiomyopathy, chronic unhealing RLE wound s/p angio, left CEA (2014), CKD presents with acute onset chest pain, pleuritic and inc dyspnea    #Chest pain, NSTEMI, CAD, s/p PCI  -in setting of increased AF rates off bb for 3 days  -resolved chest pain  -CKMB peaked  -Echo noted with EF 60%, nl lv sys fx, mild TR, mild Pr  -s/p LHC with 85% proximal LAD s/p balloon angioplasty and LULU  -c/w toprol, asa, plavix (will continue plavix for 1 month)     #R carotid stenosis  -cont med tx  -MRA noted with Greater than 75% stenosis of the right distal common carotid artery. Approximately 60% stenosis of the proximal left internal carotid artery.  -Continue ASA and Statin Therapy    #Ischemic cardiomyopathy, chronic systolic/diastolic HF  -BB, ASA, Statin  -bumex/ po metolazone on hold given creat rising - eventual resume   -Echo as above     #HTN  -stable, cont home meds  -off ACE due to hyperkalemia hx    #alex on ckd  -diuretics on hold  -renal following     #Afib  -rates stable  -c/w CCB and BB  -pradaxa resumed     -Advanced care planning discussed with patient. Advanced care planning forms discussed with patient and/or family.  Risks, benefits, and alternatives of medical/cardiac procedures were discussed in detail with all questions answered.  30 minutes were spent addressing advance care planning.           LHC 2/23/22: 85% proximal LAD s/p balloon angioplasty and LULU  Echo 2/22/22: EF 60%, nl lv sys fx, mild TR, mild Pr  Echo 10/17/19: EF 45%, Grossly borderline left ventricular systolic dysfunction.   ** Compared with echocardiogram of 12/29/2018, no significant changes noted (reported LVEF in last study is slightly lower, but atrial fibrillation makes it difficult to assess ejection fraction).    a/p  68 y/o male with a past medical history of HTN, DMT2 c/b LLE neuropathy, CVA/TIA, Afib (on Pradaxa , MI, CAD (NSTEMI) x 3 stents in 2014 to LAD, and proximal and distal LCx, ischemic cardiomyopathy, chronic unhealing RLE wound s/p angio, left CEA (2014), CKD presents with acute onset chest pain, pleuritic and inc dyspnea    #Chest pain, NSTEMI, CAD, s/p PCI  -in setting of increased AF rates off bb for 3 days  -resolved chest pain  -CKMB peaked  -Echo noted with EF 60%, nl lv sys fx, mild TR, mild Pr  -s/p LHC with 85% proximal LAD s/p balloon angioplasty and LULU  -c/w toprol, asa, plavix (will continue plavix for 1 month)     #R carotid stenosis  -cont med tx  -MRA noted with Greater than 75% stenosis of the right distal common carotid artery. Approximately 60% stenosis of the proximal left internal carotid artery.  -Continue ASA and Statin Therapy    #Ischemic cardiomyopathy, chronic systolic/diastolic HF  -BB, ASA, Statin  -bumex/ po metolazone on hold given creat rising - eventual resume   -Echo as above     #HTN  -stable, cont home meds  -off ACE due to hyperkalemia hx    #alex on ckd  -diuretics on hold  -renal following     #Afib  -rates stable  -c/w CCB and BB  -pradaxa resumed     -Advanced care planning discussed with patient. Advanced care planning forms discussed with patient and/or family.  Risks, benefits, and alternatives of medical/cardiac procedures were discussed in detail with all questions answered.  30 minutes were spent addressing advance care planning.      pt to f/u Dr. Colby Mazariegos on 3/24 @130pm

## 2022-02-24 NOTE — PROGRESS NOTE ADULT - SUBJECTIVE AND OBJECTIVE BOX
DATE OF SERVICE: 02-24-22 @ 13:22  CHIEF COMPLAINT:Patient is a 67y old  Male who presents with a chief complaint of chest pain (24 Feb 2022 12:44)    	        PAST MEDICAL & SURGICAL HISTORY:  HTN (hypertension), benign    HLD (hyperlipidemia)    DM type 2 (diabetes mellitus, type 2)    TIA (transient ischemic attack)    Atrial fibrillation    MI (myocardial infarction)  circa 2014    CAD (coronary artery disease)    Neuropathy    Status post angioplasty with stent  LULU x 3 2/7/2014    S/P carotid endarterectomy  left              RESPIRATORY: No cough, wheezing, chills or hemoptysis; No Shortness of Breath  CARDIOVASCULAR: No chest pain, palpitations, passing out,  GASTROINTESTINAL: No abdominal or epigastric pain. No nausea, vomiting, or hematemesis; No diarrhea or constipation. No melena or hematochezia.  GENITOURINARY: No dysuria, frequency, hematuria, or incontinence  NEUROLOGICAL: No headaches,     Medications:  MEDICATIONS  (STANDING):  allopurinol 100 milliGRAM(s) Oral daily  aspirin enteric coated 81 milliGRAM(s) Oral daily  atorvastatin 40 milliGRAM(s) Oral at bedtime  clopidogrel Tablet 75 milliGRAM(s) Oral daily  collagenase Ointment 1 Application(s) Topical daily  dabigatran 150 milliGRAM(s) Oral every 12 hours  dextrose 40% Gel 15 Gram(s) Oral once  dextrose 5% + sodium chloride 0.9%. 1000 milliLiter(s) (50 mL/Hr) IV Continuous <Continuous>  dextrose 5%. 1000 milliLiter(s) (50 mL/Hr) IV Continuous <Continuous>  dextrose 5%. 1000 milliLiter(s) (100 mL/Hr) IV Continuous <Continuous>  dextrose 50% Injectable 25 Gram(s) IV Push once  dextrose 50% Injectable 12.5 Gram(s) IV Push once  dextrose 50% Injectable 25 Gram(s) IV Push once  diltiazem    milliGRAM(s) Oral at bedtime  glucagon  Injectable 1 milliGRAM(s) IntraMuscular once  insulin glargine Injectable (LANTUS) 28 Unit(s) SubCutaneous at bedtime  insulin lispro (ADMELOG) corrective regimen sliding scale   SubCutaneous three times a day before meals  insulin lispro (ADMELOG) corrective regimen sliding scale   SubCutaneous at bedtime  insulin lispro Injectable (ADMELOG) 20 Unit(s) SubCutaneous before breakfast  insulin lispro Injectable (ADMELOG) 12 Unit(s) SubCutaneous before lunch  insulin lispro Injectable (ADMELOG) 10 Unit(s) SubCutaneous before dinner  metoprolol succinate  milliGRAM(s) Oral daily  pregabalin 100 milliGRAM(s) Oral three times a day  sodium chloride 0.9%. 1000 milliLiter(s) (75 mL/Hr) IV Continuous <Continuous>    MEDICATIONS  (PRN):  oxycodone    5 mG/acetaminophen 325 mG 1 Tablet(s) Oral every 12 hours PRN Severe Pain (7 - 10)    	    PHYSICAL EXAM:  T(C): 36.4 (02-24-22 @ 09:51), Max: 36.7 (02-24-22 @ 01:06)  HR: 72 (02-24-22 @ 09:51) (62 - 85)  BP: 137/59 (02-24-22 @ 09:51) (135/63 - 165/74)  RR: 16 (02-24-22 @ 09:51) (16 - 18)  SpO2: 98% (02-24-22 @ 09:51) (93% - 98%)  Wt(kg): --  I&O's Summary    23 Feb 2022 07:01  -  24 Feb 2022 07:00  --------------------------------------------------------  IN: 1080 mL / OUT: 2075 mL / NET: -995 mL    24 Feb 2022 07:01  -  24 Feb 2022 13:22  --------------------------------------------------------  IN: 300 mL / OUT: 700 mL / NET: -400 mL        Appearance: Normal	  HEENT:   Normal oral mucosa, PERRL, EOMI	  Lymphatic: No lymphadenopathy  Cardiovascular: Normal S1 S2, No JVD,   Respiratory: Lungs clear to auscultation	  Psychiatry: A & O x 3, Mood & affect appropriate  Gastrointestinal:  Soft, Non-tender, + BS	  	  Neurologic: Non-focal  Extremities: pvd  edema      TELEMETRY: 	    ECG:  	  RADIOLOGY:  OTHER: 	  	  LABS:	 	    CARDIAC MARKERS:                                13.0   8.55  )-----------( 222      ( 24 Feb 2022 07:31 )             42.8     02-24    133<L>  |  97  |  70<H>  ----------------------------<  244<H>  4.2   |  23  |  2.12<H>    Ca    9.0      24 Feb 2022 07:28  Mg     2.4     02-24      proBNP:   Lipid Profile:   HgA1c:   TSH:

## 2022-02-24 NOTE — PROGRESS NOTE ADULT - ASSESSMENT
67 year old man with CAD, hx of NSTEMI s/p 3 stents in 2014 (stents to LAD, proximal and distal LCx), ischemic cardiomyopathy, HTN, T2DM c/b peripheral neuropathy, CVA, TIA, Afib on Pradaxa, chronic RLE wound, L carotid stenosis s/p endarterectomy (2014) admitted with acute onset chest pain for one day. Also with SOB. Endocrine consulted for T2DM management.    Uncontrolled T2DM with hyperglycemia  A1c 10.1%  Home regimen: basaglar 25, novolog 10  - FBG improving but still above goal, increase lantus; Lunch BG above goal will adjust  - Adjust admelog to 22 units; admelog 12 units before lunch and 10 units before dinner  - Increase Lantus 30 units at bedtime.   (If pt will be NPO for procedure then give 20 units)  - Continue moderate Admelog correction scales  -Goal -180  Discharge plan:  -anticipate discharge on basal/bolus insulin and Trulicity. Patient already has script filled for Trulicity 0.75 mg SQ weekly at home,  and he now agrees to start med.   Can f/u with his home Endocrinologist.      HLD  -Continue atorvastatin 40mg daily    Discussed with patient and RN  Contact via [employer] Teams Tues/Thurs/Friday during business hours  On evenings and weekends, please call [phone] or page endocrine fellow on call.   Please note that this patient may be followed by different provider tomorrow. If no answer, contact endocrine fellow on call [phone] M-F 9a-5pm  [employer] password: [email or web address]      Time spent on encounter: 28  minutes spent on total encounter; The necessity of the time spent during the encounter on this date of service was due to development of plan of care/coordination of care/glycemic control through review of labs, blood glucose values and vital signs.  67 year old man with CAD, hx of NSTEMI s/p 3 stents in 2014 (stents to LAD, proximal and distal LCx), ischemic cardiomyopathy, HTN, T2DM c/b peripheral neuropathy, CVA, TIA, Afib on Pradaxa, chronic RLE wound, L carotid stenosis s/p endarterectomy (2014) admitted with acute onset chest pain for one day. Also with SOB. Endocrine consulted for T2DM management.    Uncontrolled T2DM with hyperglycemia  A1c 10.1%  Home regimen: basaglar 25, novolog 10  - FBG improving but still above goal, increase lantus; Lunch BG above goal will adjust  - Adjust admelog to 22 units; admelog 12 units before lunch and 10 units before dinner  - Increase Lantus 30 units at bedtime.   (If pt will be NPO for procedure then give 20 units)  - Continue moderate Admelog correction scales  -Goal -180  Discharge plan:  If DC in next 24 hour: if FBG at goal on 2/25 can dc on Lantus 28 units sq qhs, Novolog 10 units tid w/ meals + moderate correction scale for BG >200 (uses scale at home) ; and Trulicity ; Patient already has script filled for Trulicity 0.75 mg SQ weekly at home,  and he now agrees to start med.   Can f/u with his home Endocrinologist.      HLD  -Continue atorvastatin 40mg daily    Discussed with patient and RN  Contact via Microsoft Teams Tues/Thurs/Friday during business hours  On evenings and weekends, please call [phone] or page endocrine fellow on call.   Please note that this patient may be followed by different provider tomorrow. If no answer, contact endocrine fellow on call [phone] M-F 9a-5pm  [employer] password: [email or web address]      Time spent on encounter: 28  minutes spent on total encounter; The necessity of the time spent during the encounter on this date of service was due to development of plan of care/coordination of care/glycemic control through review of labs, blood glucose values and vital signs.

## 2022-02-25 NOTE — DISCHARGE NOTE NURSING/CASE MANAGEMENT/SOCIAL WORK - PATIENT PORTAL LINK FT
You can access the FollowMyHealth Patient Portal offered by [facility] by registering at the following website: [email or web address]. By joining [facility]’s FollowMyHealth portal, you will also be able to view your health information using other applications (apps) compatible with our system.

## 2022-02-25 NOTE — PROGRESS NOTE ADULT - PROBLEM SELECTOR PROBLEM 2
HLD (hyperlipidemia)
Chest pain
HLD (hyperlipidemia)
Chest pain
HLD (hyperlipidemia)
Chest pain

## 2022-02-25 NOTE — DISCHARGE NOTE PROVIDER - HOSPITAL COURSE
67M with PMH of CAD, NSTEMI s/p 3 stents in 2014 (stents to LAD, proximal and distal LCx), ischemic cardiomyopathy, HTN, T2DM c/b peripheral neuropathy, CVA, TIA, Afib on Pradaxa, chronic RLE wound, L carotid stenosis s/p endarterectomy (2014) presents to the ED with acute onset chest pain for one day. Pain started right after dinner and was continuous. Pain was sharp, burning, radiated to the L side of the chest. Patient also had associated shortness of breath. He was unable to lay flat, sat up in bed all night, Usually uses 2 pillows. Dyspnea worse on exertion. Patient denies any nausea, vomiting, abdominal pain, diarrhea or dysuria. No melena or hematochezia. Of note, patient missed a few doses of metoprolol.     In the ED, T is 97.4, , /73, RR 18 satting 97% on room air. Patient received aspirin 324mg X1.         #Chest pain, NSTEMI, CAD, s/p PCI  -in setting of increased AF rates off bb for 3 days  -resolved chest pain  -CKMB peaked  -Echo noted with EF 60%, nl lv sys fx, mild TR, mild Pr  -s/p LHC with 85% proximal LAD s/p balloon angioplasty and LULU  -c/w toprol, asa, plavix (will continue plavix for 1 month)     #R carotid stenosis  -cont med tx  -MRA noted with Greater than 75% stenosis of the right distal common carotid artery. Approximately 60% stenosis of the proximal left internal carotid artery.  -Continue ASA and Statin Therapy    #Ischemic cardiomyopathy, chronic systolic/diastolic HF  -BB, ASA, Statin  -bumex/ po metolazone was on hold given creat rising but improved .; resumed bumex tomorrow  -Echo as above     #HTN  -stable, cont home meds  -off ACE due to hyperkalemia hx    #alex on ckd  -Creatine improved resumed on discharge    #Afib  -rates stable  -c/w CCB and BB  -pradaxa resumed   \  Pt with hx of hydroPTX. CT chest non contrast with pleural thickening, atelectasis LLL. Will require repeat CT chest in 1-3 months. Ptn  diabetic with HGBA1c 10 seen by endocrinology and medication adjusted , please follow up with endocrinologist upon discharge. During hospital stay patient was found with a left foot ulcer , seen by podiatry no infection , patient started on santyl dressing daily and should follow up with podiatry outpatient

## 2022-02-25 NOTE — PROGRESS NOTE ADULT - SUBJECTIVE AND OBJECTIVE BOX
DATE OF SERVICE: 02-25-22 @ 12:20  CHIEF COMPLAINT:Patient is a 67y old  Male who presents with a chief complaint of chest pain (25 Feb 2022 11:15)    	        PAST MEDICAL & SURGICAL HISTORY:  HTN (hypertension), benign    HLD (hyperlipidemia)    DM type 2 (diabetes mellitus, type 2)    TIA (transient ischemic attack)    Atrial fibrillation    MI (myocardial infarction)  circa 2014    CAD (coronary artery disease)    Neuropathy    Status post angioplasty with stent  LULU x 3 2/7/2014    S/P carotid endarterectomy  left            REVIEW OF SYSTEMS:    NECK: No pain or stiffness  RESPIRATORY: No cough, wheezing, chills or hemoptysis; No Shortness of Breath  CARDIOVASCULAR: No chest pain, palpitations, passing out, dizziness  GASTROINTESTINAL: No abdominal or epigastric pain. No nausea, vomiting,  GENITOURINARY: No dysuria, frequency, hematuria, or incontinence  NEUROLOGICAL: No headaches,     Medications:  MEDICATIONS  (STANDING):  allopurinol 100 milliGRAM(s) Oral daily  aspirin enteric coated 81 milliGRAM(s) Oral daily  atorvastatin 40 milliGRAM(s) Oral at bedtime  buMETAnide 2 milliGRAM(s) Oral daily  clopidogrel Tablet 75 milliGRAM(s) Oral daily  collagenase Ointment 1 Application(s) Topical daily  dabigatran 150 milliGRAM(s) Oral every 12 hours  dextrose 40% Gel 15 Gram(s) Oral once  dextrose 5% + sodium chloride 0.9%. 1000 milliLiter(s) (50 mL/Hr) IV Continuous <Continuous>  dextrose 5%. 1000 milliLiter(s) (50 mL/Hr) IV Continuous <Continuous>  dextrose 5%. 1000 milliLiter(s) (100 mL/Hr) IV Continuous <Continuous>  dextrose 50% Injectable 25 Gram(s) IV Push once  dextrose 50% Injectable 12.5 Gram(s) IV Push once  dextrose 50% Injectable 25 Gram(s) IV Push once  diltiazem    milliGRAM(s) Oral at bedtime  glucagon  Injectable 1 milliGRAM(s) IntraMuscular once  insulin glargine Injectable (LANTUS) 30 Unit(s) SubCutaneous at bedtime  insulin lispro (ADMELOG) corrective regimen sliding scale   SubCutaneous three times a day before meals  insulin lispro (ADMELOG) corrective regimen sliding scale   SubCutaneous at bedtime  insulin lispro Injectable (ADMELOG) 22 Unit(s) SubCutaneous before breakfast  insulin lispro Injectable (ADMELOG) 12 Unit(s) SubCutaneous before lunch  insulin lispro Injectable (ADMELOG) 10 Unit(s) SubCutaneous before dinner  metoprolol succinate  milliGRAM(s) Oral daily  pregabalin 100 milliGRAM(s) Oral three times a day  sodium chloride 0.9%. 1000 milliLiter(s) (75 mL/Hr) IV Continuous <Continuous>    MEDICATIONS  (PRN):  oxycodone    5 mG/acetaminophen 325 mG 1 Tablet(s) Oral every 12 hours PRN Severe Pain (7 - 10)    	    PHYSICAL EXAM:  T(C): 36.4 (02-25-22 @ 09:58), Max: 36.9 (02-24-22 @ 13:32)  HR: 63 (02-25-22 @ 09:58) (63 - 81)  BP: 144/70 (02-25-22 @ 09:58) (133/75 - 157/77)  RR: 18 (02-25-22 @ 09:58) (16 - 18)  SpO2: 94% (02-25-22 @ 09:58) (92% - 99%)  Wt(kg): --  I&O's Summary    24 Feb 2022 07:01  -  25 Feb 2022 07:00  --------------------------------------------------------  IN: 1440 mL / OUT: 2050 mL / NET: -610 mL    25 Feb 2022 07:01  -  25 Feb 2022 12:20  --------------------------------------------------------  IN: 240 mL / OUT: 900 mL / NET: -660 mL        Appearance: Normal	  HEENT:   Normal oral mucosa, PERRL, EOMI	  Lymphatic: No lymphadenopathy  Cardiovascular: Normal S1 S2, No JVD,   Respiratory: Lungs clear to auscultation	  Psychiatry: A & O x 3,   Gastrointestinal:  Soft, Non-tender, + BS	    Neurologic: Non-focal  Extremities: pvd  TELEMETRY: 	    ECG:  	  RADIOLOGY:  OTHER: 	  	  LABS:	 	    CARDIAC MARKERS:                                13.3   9.20  )-----------( 226      ( 25 Feb 2022 07:13 )             44.3     02-25    137  |  99  |  70<H>  ----------------------------<  130<H>  4.6   |  23  |  2.22<H>    Ca    9.4      25 Feb 2022 07:13  Mg     2.4     02-24      proBNP:   Lipid Profile:   HgA1c:   TSH:

## 2022-02-25 NOTE — PROGRESS NOTE ADULT - PROBLEM SELECTOR PLAN 1
suspect cardiac in origin given elevated trops and LE edema  -Endorses compliance with Pradaxa as an outpt  -LE edema, Keep O>I as tolerated. Diuretics on hold at this time s/p cath and due to ANT on CKD. Resume diuretics when cleared by renal.   -Pt with hx of hydroPTX. CT chest non contrast with pleural thickening, atelectasis LLL. Will require repeat CT chest in 1-3 months. Findings appear chronic, doubt this is the cause of his acute SOB.   -LE duplex neg DVT   -Normoxic, keep sats >92% with supplemental O2 PRN.  -SOB resolved  -D/c planning per primary team, f/u in office

## 2022-02-25 NOTE — DISCHARGE NOTE PROVIDER - NSDCFUADDAPPT_GEN_ALL_CORE_FT
Pt with hx of hydropneumothorax  CT chest non contrast with pleural thickening, atelectasis LLL. Will require repeat CT chest in 1-3 months

## 2022-02-25 NOTE — PROGRESS NOTE ADULT - TIME BILLING
Agree with above NP note.  #Chest pain/NSTEMI/CAD/PCI hx  -in setting of missing bb dosing for 3 days   -now chest pain, dyspnea free  -continue toprol   -plavix load 600 x 1 today   -cont asa  -stop pradaxa, start iv hep tonight   -ckmb peaked  -echo   -likely cath tuesday pending renal fxn    #R carotid stenosis  -cont med tx  -MRA noted with Greater than 75% stenosis of the right distal common carotid artery. Approximately 60% stenosis of the proximal left internal carotid artery.  -Continue ASA and Statin Therapy    #Ischemic cardiomyopathy, chronic systolic/diastolic HF  -BB, ASA, Statin  -IV bumex/ po metolazone as ordered  -dyspnea out of proportion to volume status   -inc dyspnea could be secondary to ischemia vs new pulmonary process  -check ct chest   -if renal fxn worsens will back off diuretics   -compliant with therp pradaxa  -pending le doppler r/o dvt     #HTN  -cont home meds  -off ACE due to hyperkalemia hx    #alex on ckd  -renal f/u    #Afib  -rates stable- c/w CCB and BB  -pradaxa on hold-start Hep gtt
Agree with above NP note.  cv stable  no chest pain, inc dyspnea  cont dap, iv hep  plan for cath bob pending improvement in creat  diuretics on hold   f/u echo
Patient seen and examined.  Agree with above NP note.  cv stable  s/p PCI of proximal LAD 2/23  distal lcx stent occluded, will cont med tx distal lcx territory small, has ckd, and with nl EF  cont asa, plavix, a/c  plavix will be stopped in 1 month   renal fxn stable  restart diuretics in 24-48 hours  d/c planning
Agree with above NP note.  cv stable  no chest pain, inc dyspnea  cont dap, iv hep  echo with overall normal EF  plan for cath bob pending improvement in creat  diuretics on hold
Patient seen and examined.  Agree with above NP note.  cv stable  s/p PCI of proximal LAD 2/23  distal lcx stent occluded, will cont med tx distal lcx territory small, has ckd, and with nl EF  cont asa, plavix, a/c  plavix will be stopped in 1 month   renal fxn stable  restart diuretics upon DC  d/c planning

## 2022-02-25 NOTE — PROGRESS NOTE ADULT - PROVIDER SPECIALTY LIST ADULT
Internal Medicine
Pulmonology
Cardiology
Internal Medicine
Pulmonology
Cardiology
Internal Medicine
Internal Medicine
Nephrology
Cardiology
Internal Medicine
Endocrinology
Endocrinology
Pulmonology
Endocrinology
Pulmonology
Pulmonology

## 2022-02-25 NOTE — DISCHARGE NOTE PROVIDER - NSDCMRMEDTOKEN_GEN_ALL_CORE_FT
acetaminophen 325 mg oral tablet: 2 tab(s) orally every 6 hours, As needed, Temp greater or equal to 38C (100.4F), Mild Pain (1 - 3)  allopurinol 100 mg oral tablet: 1 tab(s) orally once a day  aspirin 81 mg oral delayed release tablet: 1 tab(s) orally once a day  bumetanide 2 mg oral tablet: 1 tab(s) orally once a day  dilTIAZem 120 mg/24 hours oral capsule, extended release: 1 cap(s) orally once a day  insulin glargine 100 units/mL subcutaneous solution: 25 unit(s) subcutaneous once a day  metOLazone 5 mg oral tablet: 1 tab(s) orally 3 times a week  metoprolol succinate 50 mg oral tablet, extended release: 2 tab(s) orally once a day  NovoLOG FlexPen 100 units/mL injectable solution: 10 unit(s) subcutaneous 3 times a day  oxycodone-acetaminophen 5 mg-325 mg oral tablet: 1 tab(s) orally 2 times a day  Pradaxa 150 mg oral capsule: 1 cap(s) orally 2 times a day  pregabalin 100 mg oral capsule: 1 cap(s) orally 3 times a day   allopurinol 100 mg oral tablet: 1 tab(s) orally once a day  aspirin 81 mg oral delayed release tablet: 1 tab(s) orally once a day  atorvastatin 40 mg oral tablet: 1 tab(s) orally once a day (at bedtime)  bumetanide 2 mg oral tablet: 1 tab(s) orally once a day  clopidogrel 75 mg oral tablet: 1 tab(s) orally once a day for 1 month  collagenase 250 units/g topical ointment: 1 application topically once a day  dilTIAZem 120 mg/24 hours oral capsule, extended release: 1 cap(s) orally once a day  insulin glargine 100 units/mL subcutaneous solution: 28 unit(s) subcutaneous once a day (at bedtime)   metOLazone 5 mg oral tablet: 1 tab(s) orally 3 times a week  metoprolol succinate 50 mg oral tablet, extended release: 2 tab(s) orally once a day  NovoLOG 100 units/mL subcutaneous solution: subcutaneous 4 times a day (before meals and at bedtime)  NovoLOG FlexPen 100 units/mL injectable solution: 10 unit(s) subcutaneous 3 times a day  oxycodone-acetaminophen 5 mg-325 mg oral tablet: 1 tab(s) orally 2 times a day  Pradaxa 150 mg oral capsule: 1 cap(s) orally 2 times a day  pregabalin 100 mg oral capsule: 1 cap(s) orally 3 times a day

## 2022-02-25 NOTE — DISCHARGE NOTE PROVIDER - NSDCCPCAREPLAN_GEN_ALL_CORE_FT
PRINCIPAL DISCHARGE DIAGNOSIS  Diagnosis: Elevated troponin level  Assessment and Plan of Treatment: 68 y/o male with a past medical history of HTN, DMT2 c/b LLE neuropathy, CVA/TIA, Afib (on Pradaxa , MI, CAD (NSTEMI) x 3 stents in 2014 to LAD, and proximal and distal LCx, ischemic cardiomyopathy, chronic unhealing RLE wound s/p angio, left CEA (2014), CKD presents with acute onset chest pain, pleuritic and inc dyspnea  #Chest pain, NSTEMI, CAD, s/p PCI  -in setting of increased AF rates off bb for 3 days  -resolved chest pain  -  -Echo noted with EF 60%, nl lv sys fx, mild TR, mild Pr  -s/p LHC with 85% proximal LAD s/p balloon angioplasty and LULU  -c/w toprol, asa, plavix (will continue plavix for 1 month) PLEASE FOLLOW UP WITH DR SAGASTUME AS OUTLINE IN FOLLOW UP APPT.        SECONDARY DISCHARGE DIAGNOSES  Diagnosis: Open wound  Assessment and Plan of Treatment: LOWER EXTREMITY PHYSICAL EXAM:  Assessment	  68 y/o male pt with left foot ulcer to subQ  - pt seen and evaluated  - left foot ulcer noted with no signs of infection  - wound cleansed and dressing applied   - rec daily dressing changes with santyl and DSD  - rec z flow boots in bed at all times      Diagnosis: T2DM (type 2 diabetes mellitus)  Assessment and Plan of Treatment: Uncontrolled T2DM with hyperglycemia  A1c 10.1%  Home regimen: basaglar 25, novolog 10  - FBG improving but still above goal, increase lantus; Lunch BG above goal will adjust  - Adjust admelog to 22 units; admelog 12 units before lunch and 10 units before dinner  - Increase Lantus 30 units at bedtime.   (If pt will be NPO for procedure then give 20 units)  - Continue moderate Admelog correction scales  -Goal -180  Discharge plan:  If DC in next 24 hour: if FBG at goal on 2/25 can dc on Lantus 28 units sq qhs, Novolog 10 units tid w/ meals + moderate correction scale for BG >200 (uses scale at home) ; and Trulicity ; Patient already has script filled for Trulicity 0.75 mg SQ weekly at home,  and he now agrees to start med.   Can f/u with his home Endocrinologist.      Diagnosis: Chronic kidney disease, unspecified CKD stage  Assessment and Plan of Treatment: Your creatine was elevated in the hospital and diuretics were held , creatine stabilized and resumed diuretics tommorow, please follow up with your PCP for labs to check your creatine  Avoid taking (NSAIDs) - (ex: Ibuprofen, Advil, Celebrex, Naprosyn)  Avoid taking any nephrotoxic agents (can harm kidneys) - Intravenous contrast for diagnostic testing, combination cold medications.  Have all medications adjusted for your renal function by your Health Care Provider.  Blood pressure control is important.  Take all medication as prescribed. Please follow up with nephrology as indicated in follow up appointments      Diagnosis: SOB (shortness of breath)  Assessment and Plan of Treatment:   ·  Problem: SOB (shortness of breath).   ·  Plan: suspect cardiac in origin given elevated trops and LE edema  -Endorses compliance with Pradaxa as an outpt  -LE edema, Keep O>I as tolerated.   -Pt with hx of hydroPTX. CT chest non contrast with pleural thickening, atelectasis LLL. Will require repeat CT chest in 1-3 months. Findings appear chronic, doubt this is the cause of his acute SOB.   -LE duplex neg DVT   -Normoxic, keep sats >92% with supplemental O2 PRN.  -SOB resolved      Diagnosis: Afib  Assessment and Plan of Treatment: Afib   Heart rate stable  -c/w CCB and BB  -pradaxa resumed       Diagnosis: HLD (hyperlipidemia)  Assessment and Plan of Treatment: Coronary artery disease is a condition where the arteries the supply the heart muscle get clogges with fatty deposits & puts you at risk for a heart attack  Call your doctor if you have any new pain, pressure, or discomfort in the center of your chest, pain, tingling or discomfort in arms, back, neck, jaw, or stomach, shortness of breath, nausea, vomiting, burping or heartburn, sweating, cold and clammy skin, racing or abnormal heartbeat for more than 10 minutes or if they keep coming & going.  Call 911 and do not tr to get to hospital by care  You can help yourself with lefestyle changes (quitting smoking if you smoke), eat lots of fruits & vegetables & low fat dairy products, not a lot of meat & fatty foods, walk or some form of physical activity most days of the week, lose weight if you are overweight  Take your cardiac medication as prescribed to lower cholesterol, to lower blood pressure, aspirin to prevent blood clots, and diabetes control  Make sure to keep appointments with doctor for cardiac follow up care      Diagnosis: Carotid stenosis  Assessment and Plan of Treatment: 68 y/o male with a past medical history of HTN, DMT2 c/b LLE neuropathy, CVA/TIA, Afib (on Pradaxa , MI, CAD (NSTEMI) x 3 stents in 2014 to LAD, and proximal and distal LCx, ischemic cardiomyopathy, chronic unhealing RLE wound s/p angio, left CEA (2014), CKD presents with acute onset chest pain, pleuritic and inc dyspnea  R carotid stenosis  -cont med tx  -MRA noted with Greater than 75% stenosis of the right distal common carotid artery. Approximately 60% stenosis of the proximal left internal carotid artery.  -Continue ASA and Statin Therapy  #    Diagnosis: Chest pain  Assessment and Plan of Treatment: 68 y/o male with a past medical history of HTN, DMT2 c/b LLE neuropathy, CVA/TIA, Afib (on Pradaxa , MI, CAD (NSTEMI) x 3 stents in 2014 to LAD, and proximal and distal LCx, ischemic cardiomyopathy, chronic unhealing RLE wound s/p angio, left CEA (2014), CKD presents with acute onset chest pain, pleuritic and inc dyspnea  #Chest pain, NSTEMI, CAD, s/p PCI  -in setting of increased AF rates off bb for 3 days  -resolved chest pain  -CKMB peaked  -Echo noted with EF 60%, nl lv sys fx, mild TR, mild Pr  -s/p LHC with 85% proximal LAD s/p balloon angioplasty and LULU  -c/w toprol, asa, plavix (will continue plavix for 1 month)       Diagnosis: Cardiomyopathy, ischemic  Assessment and Plan of Treatment: 68 y/o male with a past medical history of HTN, DMT2 c/b LLE neuropathy, CVA/TIA, Afib (on Pradaxa , MI, CAD (NSTEMI) x 3 stents in 2014 to LAD, and proximal and distal LCx, ischemic cardiomyopathy, chronic unhealing RLE wound s/p angio, left CEA (2014), CKD presents with acute onset chest pain, pleuritic and inc dyspnea  #Ischemic cardiomyopathy, chronic systolic/diastolic HF  -BB, ASA, Statin  -bumex/ po metolazone  resume on discharge      Diagnosis: NSVT (nonsustained ventricular tachycardia)  Assessment and Plan of Treatment:      PRINCIPAL DISCHARGE DIAGNOSIS  Diagnosis: Chest pain  Assessment and Plan of Treatment: #Chest pain, NSTEMI, CAD, s/p PCI  -s/p balloon angioplasty and Drug eluting stent  -c/w toprol, asa, plavix (will continue plavix for 1 month) . Please follow up with Dr Mazariegos  Call your doctor if you have unusual chest pain, pressure, or discomfort, shortness of breath, nausea, vomiting, burping, heartburn, tingling upper body parts, sweating, cold, clammy sking, racing heartbeat  Call 911 if you think you are having a heart attack  Take all cardiac medications as prescribed - notify your doctor if you have any side effects  Follow cardiac diet - avoid fatty & fried foods, don't eat too much red meat, eat lots of fruits & vegetables, dairy products should be low fat  Lose weight if you are overweight  Become more active with walking, gardening, or any other activity that gets you to move        SECONDARY DISCHARGE DIAGNOSES  Diagnosis: Afib  Assessment and Plan of Treatment: Afib   Atrial fibrillation is the most common heart rhythm problem.  The condition puts you at risk for has stroke and heart attack  You were started on blood thinners to prevent possible clot and stroke complications.   Monitor for any signs of bleeding and avoid injury while on this medication  If any bleeding persistent and symptomatic stop the medication and notify your doctor immediately.  It helps if you control your blood pressure, not drink more than 1-2 alcohol drinks per day, cut down on caffeine, getting treatment for over active thyroid gland, and get regular exercise  Call your doctor if you feel your heart racing or beating unusually, chest tightness or pain, lightheaded, faint, shortness of breath especially with exercise  It is important to take your heart medication as prescribed  You may be on anticoagulation which is very important to take as directed - you may need blood work to monitor drug levels      Diagnosis: SOB (shortness of breath)  Assessment and Plan of Treatment: ·  Problem: SOB (shortness of breath). Improved.   · -Will require repeat CT chest in 1-3 months.    Diagnosis: Open wound  Assessment and Plan of Treatment: LOWER EXTREMITY PHYSICAL EXAM:  Assessment	  68 y/o male pt with left foot ulcer to subQ  - pt seen and evaluated  - left foot ulcer noted with no signs of infection  - wound cleansed and dressing applied   - rec daily dressing changes with santyl and DSD  - rec z flow boots in bed at all times      Diagnosis: T2DM (type 2 diabetes mellitus)  Assessment and Plan of Treatment: Uncontrolled T2DM with hyperglycemia  A1c 10.1%  Home regimen: basaglar 25, novolog 10  - FBG improving but still above goal, increase lantus; Lunch BG above goal will adjust  - Adjust admelog to 22 units; admelog 12 units before lunch and 10 units before dinner  - Increase Lantus 30 units at bedtime.   (If pt will be NPO for procedure then give 20 units)  - Continue moderate Admelog correction scales  -Goal -180  Discharge plan:  If DC in next 24 hour: if FBG at goal on 2/25 can dc on Lantus 28 units sq qhs, Novolog 10 units tid w/ meals + moderate correction scale for BG >200 (uses scale at home) ; and Trulicity ; Patient already has script filled for Trulicity 0.75 mg SQ weekly at home,  and he now agrees to start med.   Can f/u with his home Endocrinologist.      Diagnosis: Chronic kidney disease, unspecified CKD stage  Assessment and Plan of Treatment: Your creatine was elevated in the hospital and diuretics were held , creatine stabilized and resumed diuretics tommorow, please follow up with your PCP for labs to check your creatine  Avoid taking (NSAIDs) - (ex: Ibuprofen, Advil, Celebrex, Naprosyn)  Avoid taking any nephrotoxic agents (can harm kidneys) - Intravenous contrast for diagnostic testing, combination cold medications.  Have all medications adjusted for your renal function by your Health Care Provider.  Blood pressure control is important.  Take all medication as prescribed. Please follow up with nephrology as indicated in follow up appointments      Diagnosis: HLD (hyperlipidemia)  Assessment and Plan of Treatment: Coronary artery disease is a condition where the arteries the supply the heart muscle get clogges with fatty deposits & puts you at risk for a heart attack  Call your doctor if you have any new pain, pressure, or discomfort in the center of your chest, pain, tingling or discomfort in arms, back, neck, jaw, or stomach, shortness of breath, nausea, vomiting, burping or heartburn, sweating, cold and clammy skin, racing or abnormal heartbeat for more than 10 minutes or if they keep coming & going.  Call 911 and do not tr to get to hospital by care  You can help yourself with lefestyle changes (quitting smoking if you smoke), eat lots of fruits & vegetables & low fat dairy products, not a lot of meat & fatty foods, walk or some form of physical activity most days of the week, lose weight if you are overweight  Take your cardiac medication as prescribed to lower cholesterol, to lower blood pressure, aspirin to prevent blood clots, and diabetes control  Make sure to keep appointments with doctor for cardiac follow up care      Diagnosis: Carotid stenosis  Assessment and Plan of Treatment: 68 y/o male with a past medical history of HTN, DMT2 c/b LLE neuropathy, CVA/TIA, Afib (on Pradaxa , MI, CAD (NSTEMI) x 3 stents in 2014 to LAD, and proximal and distal LCx, ischemic cardiomyopathy, chronic unhealing RLE wound s/p angio, left CEA (2014), CKD presents with acute onset chest pain, pleuritic and inc dyspnea  R carotid stenosis  -cont med tx  -MRA noted with Greater than 75% stenosis of the right distal common carotid artery. Approximately 60% stenosis of the proximal left internal carotid artery.  -Continue ASA and Statin Therapy  #    Diagnosis: Chest pain  Assessment and Plan of Treatment: 68 y/o male with a past medical history of HTN, DMT2 c/b LLE neuropathy, CVA/TIA, Afib (on Pradaxa , MI, CAD (NSTEMI) x 3 stents in 2014 to LAD, and proximal and distal LCx, ischemic cardiomyopathy, chronic unhealing RLE wound s/p angio, left CEA (2014), CKD presents with acute onset chest pain, pleuritic and inc dyspnea  #Chest pain, NSTEMI, CAD, s/p PCI  -in setting of increased AF rates off bb for 3 days  -resolved chest pain  -CKMB peaked  -Echo noted with EF 60%, nl lv sys fx, mild TR, mild Pr  -s/p LHC with 85% proximal LAD s/p balloon angioplasty and LULU  -c/w toprol, asa, plavix (will continue plavix for 1 month)       Diagnosis: Cardiomyopathy, ischemic  Assessment and Plan of Treatment: 68 y/o male with a past medical history of HTN, DMT2 c/b LLE neuropathy, CVA/TIA, Afib (on Pradaxa , MI, CAD (NSTEMI) x 3 stents in 2014 to LAD, and proximal and distal LCx, ischemic cardiomyopathy, chronic unhealing RLE wound s/p angio, left CEA (2014), CKD presents with acute onset chest pain, pleuritic and inc dyspnea  #Ischemic cardiomyopathy, chronic systolic/diastolic HF  -BB, ASA, Statin  -bumex/ po metolazone  resume on discharge      Diagnosis: NSVT (nonsustained ventricular tachycardia)  Assessment and Plan of Treatment:

## 2022-02-25 NOTE — DISCHARGE NOTE NURSING/CASE MANAGEMENT/SOCIAL WORK - NSDCPNINST_GEN_ALL_CORE
Pain not managed by prescribed medication. Contact doctors office and make an appointment for further evaluation.

## 2022-02-25 NOTE — PROGRESS NOTE ADULT - SUBJECTIVE AND OBJECTIVE BOX
Follow-up Pulm Progress Note    No new respiratory events overnight.  Denies SOB/CP.     Medications:  MEDICATIONS  (STANDING):  allopurinol 100 milliGRAM(s) Oral daily  aspirin enteric coated 81 milliGRAM(s) Oral daily  atorvastatin 40 milliGRAM(s) Oral at bedtime  clopidogrel Tablet 75 milliGRAM(s) Oral daily  collagenase Ointment 1 Application(s) Topical daily  dabigatran 150 milliGRAM(s) Oral every 12 hours  dextrose 40% Gel 15 Gram(s) Oral once  dextrose 5% + sodium chloride 0.9%. 1000 milliLiter(s) (50 mL/Hr) IV Continuous <Continuous>  dextrose 5%. 1000 milliLiter(s) (50 mL/Hr) IV Continuous <Continuous>  dextrose 5%. 1000 milliLiter(s) (100 mL/Hr) IV Continuous <Continuous>  dextrose 50% Injectable 25 Gram(s) IV Push once  dextrose 50% Injectable 12.5 Gram(s) IV Push once  dextrose 50% Injectable 25 Gram(s) IV Push once  diltiazem    milliGRAM(s) Oral at bedtime  glucagon  Injectable 1 milliGRAM(s) IntraMuscular once  insulin glargine Injectable (LANTUS) 30 Unit(s) SubCutaneous at bedtime  insulin lispro (ADMELOG) corrective regimen sliding scale   SubCutaneous three times a day before meals  insulin lispro (ADMELOG) corrective regimen sliding scale   SubCutaneous at bedtime  insulin lispro Injectable (ADMELOG) 22 Unit(s) SubCutaneous before breakfast  insulin lispro Injectable (ADMELOG) 12 Unit(s) SubCutaneous before lunch  insulin lispro Injectable (ADMELOG) 10 Unit(s) SubCutaneous before dinner  metoprolol succinate  milliGRAM(s) Oral daily  pregabalin 100 milliGRAM(s) Oral three times a day  sodium chloride 0.9%. 1000 milliLiter(s) (75 mL/Hr) IV Continuous <Continuous>    MEDICATIONS  (PRN):  oxycodone    5 mG/acetaminophen 325 mG 1 Tablet(s) Oral every 12 hours PRN Severe Pain (7 - 10)          Vital Signs Last 24 Hrs  T(C): 36.4 (25 Feb 2022 09:58), Max: 36.9 (24 Feb 2022 13:32)  T(F): 97.5 (25 Feb 2022 09:58), Max: 98.5 (24 Feb 2022 13:32)  HR: 63 (25 Feb 2022 09:58) (63 - 81)  BP: 144/70 (25 Feb 2022 09:58) (133/75 - 157/77)  BP(mean): --  RR: 18 (25 Feb 2022 09:58) (16 - 18)  SpO2: 94% (25 Feb 2022 09:58) (92% - 99%)          02-24 @ 07:01  -  02-25 @ 07:00  --------------------------------------------------------  IN: 1440 mL / OUT: 2050 mL / NET: -610 mL          LABS:                        13.3   9.20  )-----------( 226      ( 25 Feb 2022 07:13 )             44.3     02-25    137  |  99  |  70<H>  ----------------------------<  130<H>  4.6   |  23  |  2.22<H>    Ca    9.4      25 Feb 2022 07:13  Mg     2.4     02-24            CAPILLARY BLOOD GLUCOSE      POCT Blood Glucose.: 125 mg/dL (25 Feb 2022 08:44)    PTT - ( 23 Feb 2022 22:53 )  PTT:35.6 sec        Physical Examination:  PULM: Clear to auscultation bilaterally, no significant sputum production  CVS: S1, S2 heard        RADIOLOGY REVIEWED    CT chest: < from: CT Chest No Cont (02.19.22 @ 16:45) >  FINDINGS:    AIRWAYS, LUNGS and PLEURA: Patent central airways. Severe bibasilar   pleural thickening and rounded atelectasis within basilar lower lobes.   Atelectasis of inferior segment of lingula. Scattered small calcified   granulomas. Trace left pleural effusion. No pneumothorax.    MEDIASTINUM AND STEPHON: Numerous subcentimeter mediastinal lymph nodes are   unchanged.    HEART AND VESSELS: Cardiomegaly. Coronary and aortic calcifications. No   pericardial effusion. Thoracic aorta normal in diameter. Dilated   pulmonary arteries suggestive of pulmonary hypertension.    VISUALIZED UPPER ABDOMEN: Bilateral perinephric fat stranding is   unchanged. Aortic calcification.    CHEST WALL AND BONES: No aggressive osseous lesion.    LOWER NECK: Within normal limits.    IMPRESSION:    Severe bibasilar pleural thickening and rounded atelectasis within   basilar lower lobes. Atelectasis of inferior segment of lingula. Trace   left pleural effusion. No pneumothorax.    < end of copied text >

## 2022-02-25 NOTE — PROGRESS NOTE ADULT - ASSESSMENT
Mr. Stevens is an 67 year old gentleman with PMH of CAD, NSTEMI s/p 3 stents in 2014 (stents to LAD, proximal and distal LCx), ischemic cardiomyopathy, HTN, T2DM c/b peripheral neuropathy, CVA, TIA, Afib on Pradaxa, chronic RLE wound, L carotid stenosis s/p endarterectomy (2014) presents to the ED with acute onset chest pain and sob.     1. Now ANT on CKD  2.  CHF    3. Electrolytes are acceptable.   4. Elevated bicarb in the setting of possible CO2 retention.   5. Cardiomyopathy defer to primary  6. Proteinuria   7- htn hx     ant in setting of requiring diuretics for chf  s/p cardiac cath, 54 cc contrast  creatinine in steady range  resume bumex 2mg po daily  cont cardizem cd 120 mg daily   discharge planning per primary team.

## 2022-02-25 NOTE — DISCHARGE NOTE PROVIDER - PROVIDER TOKENS
PROVIDER:[[id number]],SCHEDULEDAPPT:[03/24/2022],SCHEDULEDAPPTTIME:[01:30 PM],ESTABLISHEDPATIENT:[T]],PROVIDER:[[id number]],FOLLOWUP:[1 week]],PROVIDER:[[id number]],FOLLOWUP:[2 weeks],ESTABLISHEDPATIENT:[T]],PROVIDER:[TOKEN:[[id number]],FOLLOWUP:[1 week],ESTABLISHEDPATIENT:[T]],PROVIDER:[TOKEN:[[id number]],FOLLOWUP:[1 month]]

## 2022-02-25 NOTE — PROGRESS NOTE ADULT - PROBLEM SELECTOR PROBLEM 1
Uncontrolled type 2 diabetes mellitus with hyperglycemia
SOB (shortness of breath)
Uncontrolled type 2 diabetes mellitus with hyperglycemia
Uncontrolled type 2 diabetes mellitus with hyperglycemia
SOB (shortness of breath)
SOB (shortness of breath)

## 2022-02-25 NOTE — PROGRESS NOTE ADULT - ASSESSMENT
67M with PMH of CAD, NSTEMI s/p 3 stents in 2014 (stents to LAD, proximal and distal LCx), ischemic cardiomyopathy, HTN, T2DM c/b peripheral neuropathy, CVA, TIA, Afib on Pradaxa, chronic RLE wound, L carotid stenosis s/p endarterectomy (2014) presents to the ED with acute onset chest pain and sob     Problem/Plan - 1:  ·  Problem: Chest pain. resolved  ·  Plan: Patient presents with atypical chest pain, possibly 2/2 Afib.   - cardiology recs appreciated  c/w meds  s/p cath w. stent     Problem/Plan - 2:  ·  Problem: Cardiac arrhythmia.  ·  Plan: Patient had a run of VT on tele however asymptomatic  -c/w meds     Problem/Plan - 3:  ·  Problem: CAD (coronary artery disease).  ·  Plan: Patient has a history of CAD s/p 3 stents  c/w meds  chf hx       ·  Problem: Carotid stenosis.  ·  Plan: Patient has a history of carotic stenosis  - MRA showed greater than 75% stenosis of the R distal common carotic a, 60% stenosis of the prximal L internal carotid a  - continue aspirin qd  - continue statin qd.     Problem/Plan - 6:  ·  Problem: Cardiomyopathy, ischemic.  ·  Plan: Patient has a history of CHF  - continue diuresis with bumex, metolazone  - strict Is&Os  - daily weights  - continue aspirin, beta blocker, statin.      ·  Problem: Chronic kidney disease, unspecified CKD stage  renal f/u  alex better/creatinine at baseline  - continue to monitor  - strict Is&Os.    ·  Problem: Afib.  ·  Plan: - continue pradaxa BID  - continue metoprolol 100mg qd.      ·  Problem: Hypertension.c/w meds      ·  Problem: T2DM (type 2 diabetes mellitus).c/w tx  - monitor blood glucose closely in the setting of CKD.    ·  Problem: Preventive measure.   ·  Plan: - DVT ppx: Pradaxa  - Diet: DASH/CC    wound care for foot noted    d/c planning

## 2022-02-25 NOTE — PROGRESS NOTE ADULT - ATTENDING COMMENTS
Seen, examined, and  agree with above as scribed by NP Olesya    alex returning to baseline ckd   resume bumex  d/w cards team   plan per primary team
Seen, examined, and  agree with above as scribed by JOYCE Dacosta
dw pt

## 2022-02-25 NOTE — PROGRESS NOTE ADULT - ASSESSMENT
LHC 2/23/22: 85% proximal LAD s/p balloon angioplasty and LULU  Echo 2/22/22: EF 60%, nl lv sys fx, mild TR, mild Pr  Echo 10/17/19: EF 45%, Grossly borderline left ventricular systolic dysfunction.   ** Compared with echocardiogram of 12/29/2018, no significant changes noted (reported LVEF in last study is slightly lower, but atrial fibrillation makes it difficult to assess ejection fraction).    a/p  66 y/o male with a past medical history of HTN, DMT2 c/b LLE neuropathy, CVA/TIA, Afib (on Pradaxa , MI, CAD (NSTEMI) x 3 stents in 2014 to LAD, and proximal and distal LCx, ischemic cardiomyopathy, chronic unhealing RLE wound s/p angio, left CEA (2014), CKD presents with acute onset chest pain, pleuritic and inc dyspnea    #Chest pain, NSTEMI, CAD, s/p PCI  -in setting of increased AF rates off bb for 3 days  -resolved chest pain  -CKMB peaked  -Echo noted with EF 60%, nl lv sys fx, mild TR, mild Pr  -s/p LHC with 85% proximal LAD s/p balloon angioplasty and LULU  -c/w toprol, asa, plavix (will continue plavix for 1 month)     #R carotid stenosis  -cont med tx  -MRA noted with Greater than 75% stenosis of the right distal common carotid artery. Approximately 60% stenosis of the proximal left internal carotid artery.  -Continue ASA and Statin Therapy    #Ischemic cardiomyopathy, chronic systolic/diastolic HF  -BB, ASA, Statin  -resume bumex at dc   -Echo as above     #HTN  -stable, cont home meds  -off ACE due to hyperkalemia hx    #alex on ckd  -renal following     #Afib  -rates stable  -c/w CCB and BB  -pradaxa resumed     -Advanced care planning discussed with patient. Advanced care planning forms discussed with patient and/or family.  Risks, benefits, and alternatives of medical/cardiac procedures were discussed in detail with all questions answered.  30 minutes were spent addressing advance care planning.      pt to f/u Dr. Colby Mazariegos on 3/24 @130pm

## 2022-02-25 NOTE — DISCHARGE NOTE PROVIDER - CARE PROVIDER_API CALL
Colby Mazraiegos (MD)  Cardiovascular Disease; Interventional Cardiology; Nuclear Cardiology  [address] [facility], [address]  [place] [address]  Phone: [phone]  Fax: [phone]  Established Patient  Scheduled Appointment: 03/24/2022 01:30 PM    Logan Cordova (DO)  Nephrology  [address] [facility], [address] [address]  Phone: [phone]  Fax: [phone]  Follow Up Time: 1 week    Bari El (MD)  Critical Care Medicine  [address] [facility] [address]  [place] [address]  Phone: [phone]  Fax: [phone]  Established Patient  Follow Up Time: 2 weeks    Yayo Gallegos  INTERNAL MEDICINE  [address] [facility], [address]  [place] [address]  Phone: [phone]  Fax: [phone]  Established Patient  Follow Up Time: 1 week    Nakul Ware (DPM)  Surgery  [address] [facility], [place] [address]  Phone: [phone]  Fax: [phone]  Follow Up Time: 1 month

## 2022-02-25 NOTE — CHART NOTE - NSCHARTNOTEFT_GEN_A_CORE
Called by primary team for final insulin recs   Plan discussed with team and pt  -Lantus 28 q hs plus Novolog 10 units ac meals  -Start Trulicity 0.74mg weekly  -C/w Novolog correction scale only if BG >200s while on trulicity.   -Spoke to pt about plan of care and pt able to give teach back. Somewhat reluctant to Trulicity if insurance doesn't cover once his wife retires looses present insurance. Pt covered under her insurance  -Pt to f/u with pvt endo Dr Gallegos

## 2022-02-25 NOTE — CHART NOTE - NSCHARTNOTESELECT_GEN_ALL_CORE
wound team/Event Note
Event Note
elevated fingerstick/Event Note
elevated tropinins/Event Note
endocrine/Off Service Note

## 2022-02-25 NOTE — DISCHARGE NOTE NURSING/CASE MANAGEMENT/SOCIAL WORK - NSDCPEFALRISK_GEN_ALL_CORE
For information on Fall & Injury Prevention, visit: [email or web address] OR  [email or web address] OR  https://www.cdc.gov/steadi/patient.html

## 2022-02-25 NOTE — PROGRESS NOTE ADULT - REASON FOR ADMISSION
[facility] Hematology/Oncology Inpatient progress note  Patient: Nixon Velasquez Jr.  MRN: [id number]  Date of Service: 3/13/2018       Chief complaint      E. coli sepsis.  CLL.    Assessment     1.  A very pleasant 89 years old gentleman with CLL now admitted with E. coli sepsis due to cholecystitis.  2.  Status post cholecystostomy.  3.  Significant abdominal discomfort.    4.  Borderline performance status.    Plan     1.  Continue with IV antibiotics and other supportive care.  2.  Pain control.  3.  Incentive spirometry.  4.  DVT prophylaxis  6.  Bedsore prophylaxis.    HPI    Nixon Velasquez Jr. is a 89 y.o. old male who is known to have CLL but now admitted with E. coli sepsis secondary to acute acalculous cholecystitis.  He underwent percutaneous cholecystostomy on 10 March.  The tube got clogged and had to be changed yesterday.  He has been in a lot of pain.  He was quite sick.  His peripheral blood is growing E. coli.    As per his family he seems to be slightly improved compared to weekend.  Also still quite sick still having a lot of abdominal pain.  Needing a lot of pain medication for pain control.    Review of system      Detailed pertinent 8 system review of systems was done.  Findings noted.      Past Medical, family and social history     Medical History  Active Ambulatory (Non-Hospital) Problems    Diagnosis     Squamous cell carcinoma of skin     Anemia     Benign essential HTN     Right ear pain     Infection of ear lobe, right     Advanced directives, counseling/discussion     Hypoxia     Restless Legs Syndrome     Corrosive Esophagitis     Hiatal Hernia     Cerebral atherosclerosis     Adenocarcinoma Of The Prostate Gland     Papillary Carcinoma Of The Thyroid Gland     Type 2 Diabetes Mellitus     Cervical Radiculopathy     Iron Deficiency     Past Medical History:   Diagnosis Date     Cataract      Foot fracture, right      History of transfusion      Hypertension      Inguinal hernia  
chest pain
    Leukemia      PE (pulmonary embolism)      Pneumonia      Prostate cancer      Skin cancer      Thyroid cancer            Physical exam      Vitals:    03/13/18 1908   BP:    Pulse: (!) 110   Resp: (!) 31   Temp:    SpO2: 96%       GENERAL: No acute distress. Cooperative in conversation.  Laying in bed.  HEENT: Pupils are equal, round and reactive. Oral mucosa is clean and intact. No ulcerations or mucositis noted. No bleeding noted.  RESP:Chest symmetric lungs are clear bilaterally per auscultation. Regular respiratory rate. No wheezes or rhonchi.  CV: Normal S1 S2 Regular, rate and rhythm. No murmurs.  ABD: Soft but tender.  Biliary drain in place.  Positive bowel sounds. No organomegaly.   EXTREMITIES: Trace lower extremity edema.   NEURO: He is quite weak.  Responding to verbal stimuli.  Seems to be responding appropriately but quite slowly.  PSYCH: I think he is depressed.  SKIN: Warm dry intact.      Labs and radiology      Recent Results (from the past 24 hour(s))   POCT Glucose   Result Value Ref Range    Glucose,  mg/dL   POCT Glucose   Result Value Ref Range    Glucose,  mg/dL   POCT Glucose   Result Value Ref Range    Glucose,  mg/dL   Basic Metabolic Panel   Result Value Ref Range    Sodium 153 (HH) 136 - 145 mmol/L    Potassium 3.0 (L) 3.5 - 5.0 mmol/L    Chloride 118 (H) 98 - 107 mmol/L    CO2 27 22 - 31 mmol/L    Anion Gap, Calculation 8 5 - 18 mmol/L    Glucose 136 (H) 70 - 125 mg/dL    Calcium 7.5 (L) 8.5 - 10.5 mg/dL    BUN 46 (H) 8 - 28 mg/dL    Creatinine 0.96 0.70 - 1.30 mg/dL    GFR MDRD Af Amer >60 >60 mL/min/1.73m2    GFR MDRD Non Af Amer >60 >60 mL/min/1.73m2   Magnesium   Result Value Ref Range    Magnesium 2.2 1.8 - 2.6 mg/dL   Phosphorus Level > 2.4 no replacement required   Result Value Ref Range    Phosphorus 2.2 (L) 2.5 - 4.5 mg/dL   HM1 (CBC with Diff)   Result Value Ref Range    WBC 14.8 (H) 4.0 - 11.0 thou/uL    RBC 2.34 (L) 4.40 - 6.20 mill/uL    Hemoglobin 
chest pain
7.4 (L) 14.0 - 18.0 g/dL    Hematocrit 23.4 (L) 40.0 - 54.0 %     80 - 100 fL    MCH 31.6 27.0 - 34.0 pg    MCHC 31.6 (L) 32.0 - 36.0 g/dL    RDW 22.9 (H) 11.0 - 14.5 %    Platelets 23 (LL) 140 - 440 thou/uL    MPV 13.1 (H) 8.5 - 12.5 fL    Neutrophils % 54 50 - 70 %    Lymphocytes % 43 (H) 20 - 40 %    Monocytes % 3 2 - 10 %    Eosinophils % 0 0 - 6 %    Basophils % 0 0 - 2 %    Neutrophils Absolute 7.8 (H) 2.0 - 7.7 thou/uL    Lymphocytes Absolute 6.4 (H) 0.8 - 4.4 thou/uL    Monocytes Absolute 0.5 0.0 - 0.9 thou/uL    Eosinophils Absolute 0.0 0.0 - 0.4 thou/uL    Basophils Absolute 0.0 0.0 - 0.2 thou/uL   POCT Glucose   Result Value Ref Range    Glucose,  mg/dL   POCT Glucose   Result Value Ref Range    Glucose,  mg/dL   POCT Glucose   Result Value Ref Range    Glucose,  mg/dL   POCT Glucose   Result Value Ref Range    Glucose,  mg/dL         No results found.      Eugene Brown MD     
chest pain

## 2022-02-25 NOTE — PROGRESS NOTE ADULT - ASSESSMENT
66 y/o M with PMH of pleural effusion 2019 - s/p R thoracentesis then R chest tube placement with course c/b R hydroPTX - tx to [facility] for possible VATS decortication which was deferred, CAD, NSTEMI s/p 3 stents in 2014 (stents to LAD, proximal and distal LCx), ischemic cardiomyopathy, HTN, T2DM c/b peripheral neuropathy, CVA, TIA, Afib on Pradaxa, chronic RLE wound, L carotid stenosis s/p endarterectomy (2014). Presents to ED with acute onset chest pain, SOB for one day - +NSTEMI.

## 2022-02-25 NOTE — PROGRESS NOTE ADULT - SUBJECTIVE AND OBJECTIVE BOX
[facility]   [phone]  RENAL FOLLOW UP NOTE  --------------------------------------------------------------------------------  Chief Complaint:    24 hour events/subjective:    Patient seen and examined.   mari duncan cp    PAST HISTORY  --------------------------------------------------------------------------------  No significant changes to PMH, PSH, FHx, SHx, unless otherwise noted    ALLERGIES & MEDICATIONS  --------------------------------------------------------------------------------  Allergies    No Known Allergies    Intolerances      Standing Inpatient Medications  allopurinol 100 milliGRAM(s) Oral daily  aspirin enteric coated 81 milliGRAM(s) Oral daily  atorvastatin 40 milliGRAM(s) Oral at bedtime  buMETAnide 2 milliGRAM(s) Oral daily  clopidogrel Tablet 75 milliGRAM(s) Oral daily  collagenase Ointment 1 Application(s) Topical daily  dabigatran 150 milliGRAM(s) Oral every 12 hours  dextrose 40% Gel 15 Gram(s) Oral once  dextrose 5% + sodium chloride 0.9%. 1000 milliLiter(s) IV Continuous <Continuous>  dextrose 5%. 1000 milliLiter(s) IV Continuous <Continuous>  dextrose 5%. 1000 milliLiter(s) IV Continuous <Continuous>  dextrose 50% Injectable 25 Gram(s) IV Push once  dextrose 50% Injectable 12.5 Gram(s) IV Push once  dextrose 50% Injectable 25 Gram(s) IV Push once  diltiazem    milliGRAM(s) Oral at bedtime  glucagon  Injectable 1 milliGRAM(s) IntraMuscular once  insulin glargine Injectable (LANTUS) 30 Unit(s) SubCutaneous at bedtime  insulin lispro (ADMELOG) corrective regimen sliding scale   SubCutaneous three times a day before meals  insulin lispro (ADMELOG) corrective regimen sliding scale   SubCutaneous at bedtime  insulin lispro Injectable (ADMELOG) 22 Unit(s) SubCutaneous before breakfast  insulin lispro Injectable (ADMELOG) 12 Unit(s) SubCutaneous before lunch  insulin lispro Injectable (ADMELOG) 10 Unit(s) SubCutaneous before dinner  metoprolol succinate  milliGRAM(s) Oral daily  pregabalin 100 milliGRAM(s) Oral three times a day  sodium chloride 0.9%. 1000 milliLiter(s) IV Continuous <Continuous>    PRN Inpatient Medications  oxycodone    5 mG/acetaminophen 325 mG 1 Tablet(s) Oral every 12 hours PRN      REVIEW OF SYSTEMS  --------------------------------------------------------------------------------    Gen: denies fevers/chills,  CVS: denies chest pain/palpitations  Resp: denies SOB/Cough  GI: Denies N/V/Abd pain  : Denies dysuria/oliguria/hematuria    All other systems were reviewed and are negative, except as noted.    VITALS/PHYSICAL EXAM  --------------------------------------------------------------------------------  T(C): 36.2 (02-25-22 @ 13:05), Max: 36.5 (02-24-22 @ 17:01)  HR: 76 (02-25-22 @ 13:05) (63 - 81)  BP: 127/69 (02-25-22 @ 13:05) (127/69 - 157/77)  RR: 18 (02-25-22 @ 13:05) (18 - 18)  SpO2: 93% (02-25-22 @ 13:05) (92% - 96%)  Wt(kg): --        02-24-22 @ 07:01  -  02-25-22 @ 07:00  --------------------------------------------------------  IN: 1440 mL / OUT: 2050 mL / NET: -610 mL    02-25-22 @ 07:01  -  02-25-22 @ 13:36  --------------------------------------------------------  IN: 480 mL / OUT: 1200 mL / NET: -720 mL      Physical Exam:  	          Gen: alert and oriented, Appears comfortable          Lungs: decrease bs no rales or ronchi or wheezing          Heart: No JVD, S1S2          Abd: +BS, soft, non tender,           LE: B/L 2+ edema, chronic venous stasis changes, RLE wound, c/d    LABS/STUDIES  --------------------------------------------------------------------------------              13.3   9.20  >-----------<  226      [02-25-22 @ 07:13]              44.3     137  |  99  |  70  ----------------------------<  130      [02-25-22 @ 07:13]  4.6   |  23  |  2.22        Ca     9.4     [02-25-22 @ 07:13]      Mg     2.4     [02-24-22 @ 07:28]        PTT: 35.6       [02-23-22 @ 22:53]      Creatinine Trend:  SCr 2.22 [02-25 @ 07:13]  SCr 2.12 [02-24 @ 07:28]  SCr 2.03 [02-23 @ 07:27]  SCr 2.42 [02-22 @ 07:24]  SCr 3.00 [02-21 @ 06:21]              Urinalysis - [02-19-22 @ 23:07]      Color Light Yellow / Appearance Clear / SG 1.012 / pH 6.0      Gluc Negative / Ketone Negative  / Bili Negative / Urobili Negative       Blood Negative / Protein 30 mg/dL / Leuk Est Negative / Nitrite Negative      RBC 2 / WBC 1 / Hyaline 12 / Gran  / Sq Epi  / Non Sq Epi 1 / Bacteria Negative    Urine Creatinine 73      [02-19-22 @ 23:08]  Urine Protein 61      [02-19-22 @ 23:08]  Urine Sodium 55      [02-19-22 @ 23:08]  Urine Urea Nitrogen 177      [02-20-22 @ 04:44]  Urine Chloride 64      [02-19-22 @ 23:08]    HbA1c 11.7      [11-07-19 @ 09:14]

## 2022-02-25 NOTE — PROGRESS NOTE ADULT - SUBJECTIVE AND OBJECTIVE BOX
CARDIOLOGY FOLLOW UP - Dr. Mazariegos  DATE OF SERVICE: 2/25/22    CC no cp or sob       REVIEW OF SYSTEMS:  CONSTITUTIONAL: No fever, weight loss, or fatigue  RESPIRATORY: No cough, wheezing, chills or hemoptysis; No Shortness of Breath  CARDIOVASCULAR: No chest pain, palpitations, passing out, dizziness, or leg swelling  GASTROINTESTINAL: No abdominal or epigastric pain. No nausea, vomiting, or hematemesis; No diarrhea or constipation. No melena or hematochezia.  VASCULAR: No edema     PHYSICAL EXAM:  T(C): 36.2 (02-25-22 @ 13:05), Max: 36.9 (02-24-22 @ 13:32)  HR: 76 (02-25-22 @ 13:05) (63 - 81)  BP: 127/69 (02-25-22 @ 13:05) (127/69 - 157/77)  RR: 18 (02-25-22 @ 13:05) (16 - 18)  SpO2: 93% (02-25-22 @ 13:05) (92% - 99%)  Wt(kg): --  I&O's Summary    24 Feb 2022 07:01  -  25 Feb 2022 07:00  --------------------------------------------------------  IN: 1440 mL / OUT: 2050 mL / NET: -610 mL    25 Feb 2022 07:01  -  25 Feb 2022 13:15  --------------------------------------------------------  IN: 480 mL / OUT: 1200 mL / NET: -720 mL        Appearance: Normal	  Cardiovascular: Normal S1 S2,RRR, No JVD, No murmurs  Respiratory: Lungs clear to auscultation	  Gastrointestinal:  Soft, Non-tender, + BS	  Extremities: Normal range of motion, No clubbing, cyanosis or edema      Home Medications:  allopurinol 100 mg oral tablet: 1 tab(s) orally once a day (18 Feb 2022 21:56)  aspirin 81 mg oral delayed release tablet: 1 tab(s) orally once a day (18 Feb 2022 21:56)  bumetanide 2 mg oral tablet: 1 tab(s) orally once a day (18 Feb 2022 21:56)  metOLazone 5 mg oral tablet: 1 tab(s) orally 3 times a week (25 Feb 2022 12:31)  metoprolol succinate 50 mg oral tablet, extended release: 2 tab(s) orally once a day (18 Feb 2022 21:56)  NovoLOG 100 units/mL subcutaneous solution: subcutaneous 4 times a day (before meals and at bedtime) (25 Feb 2022 12:56)  NovoLOG FlexPen 100 units/mL injectable solution: 10 unit(s) subcutaneous 3 times a day (25 Feb 2022 12:56)  oxycodone-acetaminophen 5 mg-325 mg oral tablet: 1 tab(s) orally 2 times a day (18 Feb 2022 21:56)  Pradaxa 150 mg oral capsule: 1 cap(s) orally 2 times a day (18 Feb 2022 21:56)  pregabalin 100 mg oral capsule: 1 cap(s) orally 3 times a day (18 Feb 2022 21:56)      MEDICATIONS  (STANDING):  allopurinol 100 milliGRAM(s) Oral daily  aspirin enteric coated 81 milliGRAM(s) Oral daily  atorvastatin 40 milliGRAM(s) Oral at bedtime  buMETAnide 2 milliGRAM(s) Oral daily  clopidogrel Tablet 75 milliGRAM(s) Oral daily  collagenase Ointment 1 Application(s) Topical daily  dabigatran 150 milliGRAM(s) Oral every 12 hours  dextrose 40% Gel 15 Gram(s) Oral once  dextrose 5% + sodium chloride 0.9%. 1000 milliLiter(s) (50 mL/Hr) IV Continuous <Continuous>  dextrose 5%. 1000 milliLiter(s) (50 mL/Hr) IV Continuous <Continuous>  dextrose 5%. 1000 milliLiter(s) (100 mL/Hr) IV Continuous <Continuous>  dextrose 50% Injectable 25 Gram(s) IV Push once  dextrose 50% Injectable 12.5 Gram(s) IV Push once  dextrose 50% Injectable 25 Gram(s) IV Push once  diltiazem    milliGRAM(s) Oral at bedtime  glucagon  Injectable 1 milliGRAM(s) IntraMuscular once  insulin glargine Injectable (LANTUS) 30 Unit(s) SubCutaneous at bedtime  insulin lispro (ADMELOG) corrective regimen sliding scale   SubCutaneous three times a day before meals  insulin lispro (ADMELOG) corrective regimen sliding scale   SubCutaneous at bedtime  insulin lispro Injectable (ADMELOG) 22 Unit(s) SubCutaneous before breakfast  insulin lispro Injectable (ADMELOG) 12 Unit(s) SubCutaneous before lunch  insulin lispro Injectable (ADMELOG) 10 Unit(s) SubCutaneous before dinner  metoprolol succinate  milliGRAM(s) Oral daily  pregabalin 100 milliGRAM(s) Oral three times a day  sodium chloride 0.9%. 1000 milliLiter(s) (75 mL/Hr) IV Continuous <Continuous>      TELEMETRY: 	    ECG:  	  RADIOLOGY:   DIAGNOSTIC TESTING:  [ ] Echocardiogram:  [ ]  Catheterization:  [ ] Stress Test:    OTHER: 	    LABS:	 	    Troponin T, High Sensitivity Result: 2016 ng/L [0 - 51] (02-20 @ 12:23)  Creatine Kinase, Serum: 88 U/L [30 - 200] (02-20 @ 12:23)  CKMB Units: 8.1 ng/mL [0.0 - 6.7] (02-20 @ 12:23)  Creatine Kinase, Serum: 123 U/L [30 - 200] (02-20 @ 02:09)  Creatine Kinase, Serum: 154 U/L [30 - 200] (02-19 @ 16:39)  Troponin T, High Sensitivity Result: 1192 ng/L [0 - 51] (02-19 @ 16:39)  Creatine Kinase, Serum: 226 U/L [30 - 200] (02-19 @ 07:17)  CKMB Units: 23.2 ng/mL [0.0 - 6.7] (02-19 @ 07:17)  Troponin T, High Sensitivity Result: 1127 ng/L [0 - 51] (02-19 @ 07:17)  Creatine Kinase, Serum: 270 U/L [30 - 200] (02-19 @ 00:12)  CKMB Units: 30.3 ng/mL [0.0 - 6.7] (02-19 @ 00:12)  Troponin T, High Sensitivity Result: 971 ng/L [0 - 51] (02-19 @ 00:12)  Troponin T, High Sensitivity Result: 932 ng/L [0 - 51] (02-18 @ 17:23)                          13.3   9.20  )-----------( 226      ( 25 Feb 2022 07:13 )             44.3     02-25    137  |  99  |  70<H>  ----------------------------<  130<H>  4.6   |  23  |  2.22<H>    Ca    9.4      25 Feb 2022 07:13  Mg     2.4     02-24      PTT - ( 23 Feb 2022 22:53 )  PTT:35.6 sec

## 2022-04-02 NOTE — ED PROVIDER NOTE - SKIN, MLM
Skin normal color for race, warm, dry and intact. No evidence of rash. Oozing blood from shaving wound to L neck. B/L LE pitting edema w/ CVS.

## 2022-04-02 NOTE — ED ADULT NURSE NOTE - ED STAT RN HANDOFF DETAILS
Handoff report provided to oncoming nurse Kassandra HAYDEN RN. Understands pmh, medications given, and plan of care for patient, including unable to visualize bladder on the bladder scanner. Patient in stable condition, vital signs updated, and patient has no complaints at this time and has been updated on care plan. Explained to patient that new nurse is taking over, pt verbalized understanding.

## 2022-04-02 NOTE — ED ADULT NURSE NOTE - OBJECTIVE STATEMENT
66 YO male with PMH CAD sp CABG, sp carotid endarterectomy, HTN, HLD, DMII, a fib, & DMII,  via walk in presenting with complaints of decreased urination. As per patient, pt noted hematuria and was on antibiotics. Pt states that he was urinating approximately 5 times daily and is now peeing "half a shot glass," two times a day. Pt state sthat he had similar symptom  Pt Axox4, gross neuro intact, PERRL mm. Lungs clear throughout bilateral, respirations even, & non-labored. S1S2 heard, pulses strong and equal bilaterally. Abdomen soft, non-tender, non-distended. Skin warm, dry, and intact. Pt placed in position of comfort. Pt educated on call bell system and provided call bell. Bed in lowest position, wheels locked, appropriate side rails raised. Pt denies needs at this time. 66 YO male with PMH CAD sp CABG, sp carotid endarterectomy, HTN, HLD, DMII, a fib, & DMII,  via walk in presenting with complaints of decreased urination. As per patient, pt noted hematuria and was on antibiotics. Pt states that he was urinating approximately 5 times daily and is now peeing "half a shot glass," two times a day. Pt states that he had similar symptoms a few years ago that went away on their own. Pt endorsing weight gain.  Pt denies chest pain, palpitations, shortness of breath, headache, visual disturbances, numbness/tingling, fever, chills, diaphoresis,  nausea, vomiting, constipation, or diarrhea.   Pt Axox4, gross neuro intact, PERRL mm. Lungs clear throughout bilateral, respirations even, & non-labored. S1S2 heard, pulses strong and equal bilaterally. Abdomen soft, non-tender, non-distended. Skin warm, dry, and intact. Pt placed in position of comfort. Pt educated on call bell system and provided call bell. Bed in lowest position, wheels locked, appropriate side rails raised. Pt denies needs at this time.

## 2022-04-02 NOTE — ED PROVIDER NOTE - CLINICAL SUMMARY MEDICAL DECISION MAKING FREE TEXT BOX
67y M presents to the ED c/o decreased voiding 2/2 obstructive uropathy vs. acute renal insufficiency. Attn - 67y M presents to the ED c/o decreased voiding 2/2 obstructive uropathy vs. acute renal insufficiency.

## 2022-04-02 NOTE — ED ADULT NURSE REASSESSMENT NOTE - NS ED NURSE REASSESS COMMENT FT1
Pt received from RICHARD gilbert [facility] at change of shift, A&Ox3, breathing spontaneously, unlabored & w/o distress on room air. Pt was sating at 88% on RA and c/o SOB, placed pt on 2L pt states he feels better and is now sating 95%. Pt VS otherwise stable. Attempted to do second bladder scan on pt, bladder not visualized. MD Hayden aware. Pt resting comfortably on stretcher, safety maintained, awaits dispo. Pt received from RICHARD gilbert [facility] at change of shift, A&Ox3, breathing spontaneously, unlabored & w/o distress on room air. Pt was sating at 88% on RA and c/o SOB, placed pt on 2L pt states he feels better and is now sating 95%. Pt VS otherwise stable. Attempted to do second bladder scan on pt, bladder not visualized, pt states he feels no urge to urinate for sample will let RN know if he does need to urinate. MD Hayden aware. Pt resting comfortably on stretcher, safety maintained, awaits dispo.

## 2022-04-02 NOTE — ED PROVIDER NOTE - ATTENDING CONTRIBUTION TO CARE
I performed a history and physical exam of the patient and discussed their management with the resident/ACP/medical or PA student. I reviewed the resident/ACP/medical or PA student's note and agree with the documented findings and plan of care except where noted.  attn - see MDM

## 2022-04-02 NOTE — CONSULT NOTE ADULT - SUBJECTIVE AND OBJECTIVE BOX
Patient is a 67y old  Male who presents with a chief complaint of     HPI:  Mr. Stevens is a 67 year old gentleman with PMH of CAD, NSTEMI s/p 3 stents in 2014 (stents to LAD, proximal and distal LCx), ischemic cardiomyopathy, HTN for 10 years, T2DM for 26 years c/b peripheral neuropathy, CVA, TIA, Afib on Pradaxa, chronic RLE wound, L carotid stenosis s/p endarterectomy (2014) just recently admitted  to the [facility] on 2/19/2022 with acute onset chest pain for one day found to have an NSTEMI, CAD, s/p PCI in setting of increased AF rates off bb for 3 days and resolved chest pain, his CKMB peaked and Echo noted with EF 60%,normal LV function, mild TR, mild AL. He was s/p [facility] with 85% proximal LAD s/p balloon angioplasty and LULU. He presented to [facility] ED with decreased urine output over the week. [facility] and was put on Nitrofurantoin. Pt is still taking Nitrofurantoin w/ 5 days left. He came to the ED due to worsening symptoms. Pt reports having a similar incident 4-5 years ago that resolved spontaneously. He reports increased leg edema and 3 lb weight gain last night. Mr. Stevens is unable to lay flat, he usually uses sleeps with 2 pillows. Dyspnea worse on exertion. Patient denies any nausea, vomiting, abdominal pain, diarrhea or. No melena or hematochezia. He just said he has decreased urination and last urination was 12 hours ago. He follows with a nephrologist Dr. Gallegos and stated his baseline Serum creatinine is in the 2.0 to 2.3 mg/dl range. Nephrology consulted for ANT with serum creatinine of 7.44 mg/dl on  CKD stage 3.   .     PAST MEDICAL & SURGICAL HISTORY:  HTN (hypertension), benign  HLD (hyperlipidemia)  DM type 2 (diabetes mellitus, type 2)  TIA (transient ischemic attack)  Atrial fibrillation  MI (myocardial infarction)  circa 2014  CAD (coronary artery disease)  Neuropathy  Status post angioplasty with stent LULU x 3 2/7/2014  S/P carotid endarterectomy left        MEDICATIONS:  Home Medications:  allopurinol 100 mg oral tablet: 1 tab(s) orally once a day (18 Feb 2022 21:56)  aspirin 81 mg oral delayed release tablet: 1 tab(s) orally once a day (18 Feb 2022 21:56)  bumetanide 2 mg oral tablet: 1 tab(s) orally once a day (18 Feb 2022 21:56)  metOLazone 5 mg oral tablet: 1 tab(s) orally 3 times a week (25 Feb 2022 12:31)  metoprolol succinate 50 mg oral tablet, extended release: 2 tab(s) orally once a day (18 Feb 2022 21:56)  NovoLOG 100 units/mL subcutaneous solution: subcutaneous 4 times a day (before meals and at bedtime) (25 Feb 2022 12:56)  NovoLOG FlexPen 100 units/mL injectable solution: 10 unit(s) subcutaneous 3 times a day (25 Feb 2022 12:56)  oxycodone-acetaminophen 5 mg-325 mg oral tablet: 1 tab(s) orally 2 times a day (18 Feb 2022 21:56)  Pradaxa 150 mg oral capsule: 1 cap(s) orally 2 times a day (18 Feb 2022 21:56)  pregabalin 100 mg oral capsule: 1 cap(s) orally 3 times a day (18 Feb 2022 21:56)  Nitrofurantoin       ALLERGIES  No Known Allergies    SOCIAL HISTORY:  Denies alcohol or tobacco abuse.    FAMILY HISTORY:  Family history of heart disease  father    Family history of CVA  mother      No kidney disease in family.      REVIEW OF SYSTEMS:  CONSTITUTIONAL: Has weakness, no fevers or chills  EYES/ENT: No visual changes  NECK: No pain or stiffness  RESPIRATORY: No cough, wheezing, hemoptysis. Has mild shortness of breath  CARDIOVASCULAR: No chest pain or palpitations  GASTROINTESTINAL: No abdominal or epigastric pain. No nausea, vomiting, or hematemesis. No diarrhea or constipation. No melena or hematochezia  GENITOURINARY: No dysuria, frequency or hematuria. No stones or infection. Has decreased UOP last time urinated was on 4/02/2022 at 10:00 am.   NEUROLOGICAL: No numbness or has weakness and jerky movements.   SKIN: No itching, burning, rashes, or lesions   All other review of systems is negative unless indicated above    VITAL:  T(C): , Max: 36.6 (04-02-22 @ 18:10)  T(F): , Max: 97.8 (04-02-22 @ 18:10)  HR: 77 (04-02-22 @ 20:15)  BP: 155/78 (04-02-22 @ 20:15)  BP(mean): 108 (04-02-22 @ 18:10)  RR: 18 (04-02-22 @ 20:16)  SpO2: 95% (04-02-22 @ 20:16)      PHYSICAL EXAM:  General: NAD, fatigued, positive asterixis   HEENT: NCAT, PERRLA  Neck: Supple, No JVD  Respiratory: Bibasilar crackles  Cardiovascular: RRR s1s2, no m/r/g  Gastrointestinal: +BS, soft, NT/ND  : No keyes  Extremities: Weeping edema with blisters and 3 +.  Neurological: no focal deficits; strength grossly intact  Psychiatric: Normal mood, normal affect  Back: no CVAT b/l      LABS:                        13.3   7.69  )-----------( 225      ( 02 Apr 2022 18:27 )             43.2     Na(133)/K(5.0)/Cl(89)/HCO3(24)/BUN(141)/Cr(7.44)Glu(99)/Ca(9.0)/Mg(--)/PO4(--)    04-02 @ 18:27    04-02    133<L>  |  89<L>  |  141<H>  ----------------------------<  99  5.0   |  24  |  7.44<H>    Ca    9.0      02 Apr 2022 18:27    TPro  7.9  /  Alb  3.6  /  TBili  0.5  /  DBili  x   /  AST  18  /  ALT  6<L>  /  AlkPhos  114  04-02      IMAGING:  ACC: [id number] EXAM:  CT ABDOMEN AND PELVIS                          PROCEDURE DATE:  04/02/2022          INTERPRETATION:  CLINICAL INFORMATION: Decreased voiding x1 week. Recent   hematuria.    COMPARISON: CT abdomen/pelvis of 2/7/2017.    CONTRAST/COMPLICATIONS:  IV Contrast: None.  Oral Contrast: None.  Complications: None reported at time of study completion.    PROCEDURE:  CT of the Abdomen and Pelvis was performed.  Sagittal and coronal reformats were performed.    FINDINGS:  LOWER CHEST: Coronary and aortic valvular calcification. Partially imaged   bilateral pleural effusions, with suggestion of pleural thickening.   Opacity within the right lower lobe, may represent rounded atelectasis.   Scattered bilateral calcified granulomas.    LIVER: Within normal limits.  BILE DUCTS: Normal caliber.  GALLBLADDER: Within normal limits.  SPLEEN: Within normal limits.  PANCREAS: Within normal limits.  ADRENALS: Within normal limits.  KIDNEYS/URETERS: No hydronephrosis or nephrolithiasis. Nonspecific   symmetric bilateral perinephric stranding.    BLADDER: Minimally distended.  REPRODUCTIVE ORGANS: Prostate within normal limits.    BOWEL: No bowel obstruction. Colonic diverticulosis. Appendix is not   visualized. No evidence of inflammation in the pericecal region.  PERITONEUM: No ascites.  VESSELS: Atherosclerotic changes. Left-sided IVC.  RETROPERITONEUM/LYMPH NODES: No lymphadenopathy.  ABDOMINAL WALL: Right inguinal hernia containing a nonobstructed loop of   sigmoid colon. Dome of the urinary bladder courses towards the hernia   orifice. Small fat-containing left inguinal hernia.  BONES: Degenerative changes.    IMPRESSION:  No hydronephrosis, nephrolithiasis, or evidence of obstructive uropathy.    Small bilateral pleural effusions, with suggestion of pleural thickening.   Opacity within the right lower lobe with associated mild volume loss,   suggestive of rounded atelectasis.    Right inguinal hernia containing a nonobstructed segment of sigmoid   colon, new since prior examination of 2/7/2017.        --- End of Report ---            GEETHA ALMAGUER MD; Attending Radiologist  This document has been electronically signed. Apr 2 2022  8:02PM      ASSESSMENT:  Mr. Stevens is a 67 year old gentleman with PMH of CAD, NSTEMI s/p 3 stents in 2014 (stents to LAD, proximal and distal LCx), ischemic cardiomyopathy, HTN for 10 years, T2DM for 26 years c/b peripheral neuropathy, CVA, TIA, Afib on Pradaxa, chronic RLE wound, L carotid stenosis s/p endarterectomy (2014). [facility] and was put on Nitrofurantoin. Pt is still taking Nitrofurantoin w/ 5 days left. Pt came to the ED due to worsening symptoms. Nephrology consulted for ANT on CKD stage 3. He already received one liter of NS.     1. ANT in the setting of toxic ATN versus AIN from taking Nitrofurantoin.   2. CKD stage 3 with serum creatinine baseline Scr between 2.0 to 2.3 mg/d.   3. Hyponatremia.   4. High anion gap metabolic acidosis.   5. Cardiomyopathy defer to primary  6. HFpEF2. Volume overloaded. He had one liter of IVF thus far.     RECOMMEND:  Hold IVF for now as he has volume overload.  Will give bumex of 4 mg x one dose  check orthostatics  Bladder scan  Put keyes in  Will obtain consent for dialysis due to asterixis. Might need to be HD in am.   UA, urine sodium, urine creatinine, urine protein, and urine chloride with urine urea.    allopurinol 100 mg daily   Strict intake and output  BMP, CBC, and phosphorus in am      Thank you for involving Nephrology in this patient's care.    With warm regards,    Katie Ferrer, DO

## 2022-04-02 NOTE — ED PROVIDER NOTE - OBJECTIVE STATEMENT
67y M w/ PMHx of CAD, DM, HLD, HTN presents to the ED c/o decreased voiding x1wk. [facility] and was put on Nitrofurantoin. Pt is still taking Nitrofurantoin w/ 5 days left. Pt came to the ED due to worsening symptoms. Pt reports having a similar incident 4-5 years ago that resolved spontaneously. Denies seeing a Urologist. Denies smoking. Denies F/C, N/V/D, abd pain, dysuria, back pain, fatigue, CP, SOB, weakness. No increased leg edema but 3 lb weight gain last night.

## 2022-04-02 NOTE — ED PROVIDER NOTE - PROGRESS NOTE DETAILS
Jamal Hayden,  PGY-3: received as a signout at 7pm - unable to find bladder on bladder scan, no signs of obstruction on CT abdpelvis. Spoke with Dr. Claudy Ford - will come see the pt in the ER.     Pt is now complaining of shortness of breath requiring 2l of O2 - will get cxr. Order vbg Jamal Hayden,  PGY-3: paged Dr. Ford again Jamal Hayden,  PGY-3: left a message for Dr. Corral around [phone] - did not hear back.   Spoke with hospitalist - agrees with admission

## 2022-04-02 NOTE — ED ADULT TRIAGE NOTE - DATE OF LAST VACCINATION
Section I - General Information    Name of Patient: Ruslan Ron                 : 1929    Medicare #: [id number]  Transport Date: 10/15/19 (PCS is valid for round trips on this date and for all repetitive trips in the 60-day range as noted below )  Origin: [address]                                                         Destination: [facility]  Is the pt's stay covered under Medicare Part A (PPS/DRG)   [x]     Closest appropriate facility? If no, why is transport to more distant facility required? Yes  If hospice pt, is this transport related to pt's terminal illness? NA       Section II - Medical Necessity Questionnaire  Ambulance transportation is medically necessary only if other means of transport are contraindicated or would be potentially harmful to the patient  To meet this requirement, the patient must either be "bed confined" or suffer from a condition such that transport by means other than ambulance is contraindicated by the patient's condition  The following questions must be answered by the medical professional signing below for this form to be valid:    1)  Describe the MEDICAL CONDITION (physical and/or mental) of this patient AT [address] [place] [address] that requires the patient to be transported in an ambulance and why transport by other means is contraindicated by the patient's condition: Pt had moderate dementia with behavioral disturbances, confused  Generalized weakness due to severe sepsis  Commodities- Pulmonary embolism, Hypertension  Pt is deconditioned and weak  Pt is on O2 at 2L  Needs moderate assistance with transfers  Fall Risk  2) Is the patient "bed confined" as defined below? Yes  To be "be confined" the patient must satisfy all three of the following conditions: (1) unable to get up from bed without Assistance; AND (2) unable to ambulate; AND (3) unable to sit in a chair or wheelchair      3) Can this patient safely be transported by car or wheelchair van (i e , seated during transport without a medical attendant or monitoring)? No    4) In addition to completing questions 1-3 above, please check any of the following conditions that apply*:   *Note: supporting documentation for any boxes checked must be maintained in the patient's medical records  If hosp-hosp transfer, describe services needed at 2nd facility not available at 1st facility? Patient is confused  Medical attendant required   Requires oxygen-unable to self administer  Unable to tolerate seated position for time needed to transport       Section III - Signature of Physician or Healthcare Professional  I certify that the above information is true and correct based on my evaluation of this patient, and represent that the patient requires transport by ambulance and that other forms of transport are contraindicated  I understand that this information will be used by the Centers for Medicare and Medicaid Services (CMS) to support the determination of medical necessity for ambulance services, and I represent that I have personal knowledge of the patient's condition at time of transport  [x]  If this box is checked, I also certify that the patient is physically or mentally incapable of signing the ambulance service's claim and that the institution with which I am affiliated has furnished care, services, or assistance to the patient  My signature below is made on behalf of the patient pursuant to 42 CFR §424 36(b)(4)  In accordance with 42 CFR §424 37, the specific reason(s) that the patient is physically or mentally incapable of signing the claim form is as follows:  confusion        Signature of Physician* or Healthcare Professional______________________________________________________________  Signature Date 10/15/19 (For scheduled repetitive transports, this form is not valid for transports performed more than 60 days after this date)    Printed Name & Credentials of Physician or Healthcare Professional (MD, DO, RN, etc )_Kelly Ewing_______________________________  *Form must be signed by patient's attending physician for scheduled, repetitive transports   For non-repetitive, unscheduled ambulance transports, if unable to obtain the signature of the attending physician, any of the following may sign (choose appropriate option below)  [] Physician Assistant []  Clinical Nurse Specialist []  Registered Nurse  []  Nurse Practitioner  [x] Discharge Planner 26-Mar-2021

## 2022-04-02 NOTE — ED ADULT NURSE NOTE - NSIMPLEMENTINTERV_GEN_ALL_ED
Implemented All Fall Risk Interventions:  [place] to call system. Call bell, personal items and telephone within reach. Instruct patient to call for assistance. Room bathroom lighting operational. Non-slip footwear when patient is off stretcher. Physically safe environment: no spills, clutter or unnecessary equipment. Stretcher in lowest position, wheels locked, appropriate side rails in place. Provide visual cue, wrist band, yellow gown, etc. Monitor gait and stability. Monitor for mental status changes and reorient to person, place, and time. Review medications for side effects contributing to fall risk. Reinforce activity limits and safety measures with patient and family.

## 2022-04-02 NOTE — ED PROVIDER NOTE - NEUROLOGICAL, MLM
[facility] made food delivery to client in two parts one on 12/20 and one on 12/21  Will follow up with an appointment this Tuesday 12/22  
Alert and oriented, Chronic LE neuropawthy.

## 2022-04-03 NOTE — ED ADULT NURSE REASSESSMENT NOTE - NS ED NURSE REASSESS COMMENT FT1
Pt remains stable in the ED, A&Ox3, breathing spontaneously & unlabored w/o distress on room air. Pt refusing keyes catheter placement for strict I&Os, pt educated on clinical indication for keyes. As per MD Burks Pt remains stable in the ED, A&Ox3, breathing spontaneously & unlabored w/o distress on room air. Pt refusing keyes catheter placement for strict I&Os, pt educated on clinical indication for keyes. As per MD Burks, pt is A&O and able to use urinal for accurate output measurement with bladder scans as indicated, pt OK with this and states he will be compliant with urinal and that he will only use urinal. Pt is admitted, awaits bed.

## 2022-04-03 NOTE — H&P ADULT - NECK DETAILS
Patient with partial healed shaving abrasions LEFT neck (Patient reports, "I do not like electric razors" when advised by examiner./supple

## 2022-04-03 NOTE — CONSULT NOTE ADULT - ASSESSMENT
67M with above HPI and PMH with ANT on CKDIII,     Plan  - [facility] placement    Plan discussed with vascular surgery fellow Dr. Alphonse Woodward PGY2   Vascular Surgery [id number]  67M with above HPI and PMH with ANT on CKDIII,     Plan  - [facility] placement at bedside in ED  -Ok to use [facility] for dialysis  -Please call with questions    Plan discussed with vascular surgery fellow Dr. Alphonse Woodward PGY2   Vascular Surgery [id number]

## 2022-04-03 NOTE — CONSULT NOTE ADULT - SUBJECTIVE AND OBJECTIVE BOX
[facility], Division of Infectious Diseases  ADRIANA Seals, ALEKSANDRA King, RINA Stewart Casimir  [phone]  ([phone] - weekdays after 5pm and weekends)    DYLAN DEL CASTILLO  67y, Male  [id number]    HPI:  Patient is a 67 year old male with PMH of CAD with past multiple PCI, with most recent discharge from [facility] in 2022 for non ST elevation MI with LULU of an 85% prox LAD lesion with patient on ASA and now off Plavix per cardiology (only for one month), chronic atrial fibrillation maintained on Pradaxa, essential HTN, type 2 DM previously on oral medications but on insulin, diabetic neuropathy with chronic RIGHT LE venous stasis changes>left, LEFT foot ulcer seen by podiatry, past endarterectomy LEFT CEA in . with patient self referring to the ER following apparently treatment by an urgent care center for an apparent cystitis with hematuria on nitrofurantoin but with patient noting decreased urine output for the past week, and difficulty with B/L coarse tremors for several weeks, with patient having difficulty negotiating his applications on his smart phone (observed by examiner).  Patient with increasing B/L LE oedema and weight gain, with patient with 2 pillow orthopnoea.    Patient was recommended for a Prado but patient refused, patient wishing to review the issue with his daughter.  Patient with past history of COVID-19 in 2021 and has received the COVID-19 vaccine x 2.  NO fever, no chills, no rigors.  No diaphoresis.  No back pain, no tearing back pain. (2022 06:29)  Patient seen and examined at bedside this afternoon. Patients daughter at bedside, confirms above history. She states patient does not have any known abx allergies but macrobid was placed as allergy as ?cause of ANT. States patient initially noticed dark urine with blood, was given macrobid at urgent care and color did improve somewhat but then started to have decreased urine output with no voiding for the last 2 days. States he had a wound on L sole, has been caring for it and is much improved. Has chronic stasis changes, per daughter legs look the best they have.   ROS: 14 point review of systems completed, pertinent positives and negatives as per HPI.    Allergies: nitrofurantoin (Nephrotoxicity)    PMH -- HTN (hypertension), benign  HLD (hyperlipidemia)  DM type 2 (diabetes mellitus, type 2)  TIA (transient ischemic attack)  Atrial fibrillation  MI (myocardial infarction)  CAD (coronary artery disease)  Neuropathy  Stage 3 chronic kidney disease  2019 novel coronavirus disease (COVID-19)    PSH -- Status post angioplasty with stent  S/P carotid endarterectomy  S/P CABG (coronary artery bypass graft)    FH -- No pertinent family history in first degree relatives  Family history of heart disease  Family history of CVA    Social History -- denies tobacco, alcohol or illicit drug use    Physical Exam--  Vital Signs Last 24 Hrs  T(F): 98.7 (2022 15:30), Max: 98.7 (2022 15:30)  HR: 91 (2022 15:30) (77 - 96)  BP: 166/76 (2022 15:30) (155/78 - 167/67)  RR: 21 (2022 15:30) (18 - 21)  SpO2: 91% (2022 15:30) (88% - 96%)  General: no acute distress, obese  HEENT: NC/AT, EOMI, anicteric, neck supple  Lungs: decreased breath sounds b/l  Heart: S1 and S2 present, normal rate, no murmur heard  Abdomen: Soft. Obese, ND. Nontender. BS present.   Neuro: AAOx3, no obvious focal deficits   Extremities: B/l LE edema R>L with chronic stasis changes  Skin: Warm. Dry. Dried blood on face from shaving cuts  No visible rash. L foot/sole wound dry with no s/o infection  Psychiatric: Appropriate affect and mood for situation.   Lines: PIV      Laboratory & Imaging Data--  CBC:                       12.1   10.55 )-----------( 214      ( 2022 18:56 )             40.2     WBC Count: 10.55 K/uL (22 @ 18:56)  WBC Count: 8.64 K/uL (22 @ 06:59)  WBC Count: 9.47 K/uL (22 @ 05:30)  WBC Count: 7.69 K/uL (22 @ 18:27)    CMP:     134<L>  |  89<L>  |  144<H>  ----------------------------<  111<H>  5.5<H>   |  22  |  8.29<H>    Ca    8.5      2022 06:58  Phos  11.9     04-03  Mg     3.6     04-03    TPro  7.6  /  Alb  3.3  /  TBili  0.5  /  DBili  x   /  AST  15  /  ALT  5<L>  /  AlkPhos  107  04-03    LIVER FUNCTIONS - ( 2022 06:58 )  Alb: 3.3 g/dL / Pro: 7.6 g/dL / ALK PHOS: 107 U/L / ALT: 5 U/L / AST: 15 U/L / GGT: x           Urinalysis Basic - ( 2022 00:48 )  Color: Light Orange / Appearance: Turbid / S.021 / pH: x  Gluc: x / Ketone: Negative  / Bili: Negative / Urobili: Negative   Blood: x / Protein: 300 mg/dL / Nitrite: Negative   Leuk Esterase: Large / RBC: 20 /hpf /  /HPF   Sq Epi: x / Non Sq Epi: 3 /hpf / Bacteria: Negative    Microbiology:   4/3 - COVID-19 PCR - negative    Radiology--  Xray Chest 1 View AP/PA (22 @ 21:01) >IMPRESSION: Bibasilar patchy airspace disease likely atelectasis.    CT Abdomen and Pelvis No Cont (22 @ 19:49) >IMPRESSION: No hydronephrosis, nephrolithiasis, or evidence of obstructive uropathy. Small bilateral pleural effusions, with suggestion of pleural thickening. Opacity within the right lower lobe with associated mild volume loss, suggestive of rounded atelectasis. Right inguinal hernia containing a nonobstructed segment of sigmoid colon, new since prior examination of 2017.      Active Medications--  allopurinol 100 milliGRAM(s) Oral daily  aspirin enteric coated 81 milliGRAM(s) Oral daily  atorvastatin 40 milliGRAM(s) Oral at bedtime  cefTRIAXone   IVPB      dextrose 5%. 1000 milliLiter(s) IV Continuous <Continuous>  dextrose 5%. 1000 milliLiter(s) IV Continuous <Continuous>  dextrose 50% Injectable 25 Gram(s) IV Push once  dextrose 50% Injectable 12.5 Gram(s) IV Push once  dextrose 50% Injectable 25 Gram(s) IV Push once  dextrose Oral Gel 15 Gram(s) Oral once PRN  diltiazem    milliGRAM(s) Oral daily  glucagon  Injectable 1 milliGRAM(s) IntraMuscular once  insulin lispro (ADMELOG) corrective regimen sliding scale   SubCutaneous three times a day before meals  insulin lispro (ADMELOG) corrective regimen sliding scale   SubCutaneous at bedtime  metoprolol succinate  milliGRAM(s) Oral daily  oxycodone    5 mG/acetaminophen 325 mG 1 Tablet(s) Oral every 12 hours PRN  pregabalin 100 milliGRAM(s) Oral three times a day    Antimicrobials:   cefTRIAXone   IVPB        Immunologic:

## 2022-04-03 NOTE — H&P ADULT - PROBLEM SELECTOR PLAN 1
Oliguria.   Patient is REFUSING the recommended Prado by renal.  Discussed with daughter who will review issue with the patient.  Follow I/O.  S/P IV Bumex in the ER, and will defer to nephrology further dosing of diuretics.    Possible renal replacement therapy.

## 2022-04-03 NOTE — CONSULT NOTE ADULT - SUBJECTIVE AND OBJECTIVE BOX
VASCULAR SURGERY CONSULT NOTE  :::::::::::::::::::::::::::::::::::::::::::::::::::::  DYLAN DEL CASTILLO 67y M  MRN: [id number]  Admit Date: 22  [facility] NADIA 13  :::::::::::::::::::::::::::::::::::::::::::::::::::::  Date of Service: 22 @ 13:47  Requested by: Mendoza Corral    :::::::::::::::::::::::::::::::::::::::::::::::::::::    HPI:  NIGHT HOSPITALIST:   Patient remotely known to me from an admission to [place] in 2017--assigned to me at this point by the ER to admit this 66 y/o M--followed by his nephrologist above--patient with a history of CAD with past multiple PCI, with most recent discharge from [place] in 2022 for non ST elevation MI with LULU of an 85% prox LAD lesion with patient on ASA and now off Plavix per cardiology (only for one month), chronic atrial fibrillation maintained on Pradaxa, essential HTN, type 2 DM previously on oral medications but on insulin, diabetic neuropathy with chronic RIGHT LE venous stasis changes>left, LEFT foot ulcer seen by podiatry, past endarterectomy LEFT CEA in . with patient self referring to the ER following apparently treatment by an urgent care center for an apparent cystitis with hematuria on nitrofurantoin but with patient noting decreased urine output for the past week, and difficulty with B/L coarse tremors for several weeks, with patient having difficulty negotiating his applications on his smart phone (observed by examiner).  Patient with increasing B/L LE oedema and weight gain, with patient with 2 pillow orthopnoea.      Denies headache, chills, fever, dizziness, naseau, vomiting, chest pain, SOB, diarrhea, pain during urination or recent falls.    In the ED patient afebrile and hemodynamically stable. Laboratory results showing WBC: 8.64, Hgb/Hct: 12.9/43.3. Vascular surgery consulted following recommendations by nephrology for urgent shiley placement due to ANT on CKD3.       Allergies: nitrofurantoin (Nephrotoxicity)      Past Medical History:   HTN (hypertension), benign    HLD (hyperlipidemia)    DM type 2 (diabetes mellitus, type 2)    TIA (transient ischemic attack)    Atrial fibrillation    MI (myocardial infarction)    CAD (coronary artery disease)    Neuropathy    Stage 3 chronic kidney disease    2019 novel coronavirus disease (COVID-19)        Past Surgical History:  Status post angioplasty with stent  LULU x 3 2014    S/P carotid endarterectomy  left      Family History:  Family history of heart disease    Family history of CVA      Social History:   NO tobacco or ethanol reported.  Lives with spouse.  Supportive adult daughter.    Home Medications:  allopurinol 100 mg oral tablet  aspirin 81 mg oral delayed release tablet  bumetanide 2 mg oral tablet  insulin glargine 100 units/mL subcutaneous solution  metOLazone 5 mg oral tablet  metoprolol succinate 50 mg oral tablet, extended release  NovoLOG 100 units/mL subcutaneous solution  NovoLOG FlexPen 100 units/mL injectable solution  oxycodone-acetaminophen 5 mg-325 mg oral tablet  Pradaxa 150 mg oral capsule  pregabalin 100 mg oral capsule      ::::::::::::::::::::::::::::::::::::::::::::::::::::::::::::::::::::::::::::::::::::::::::::::::::::::::::::::::::::::::::::::::::::::::::    Vital signs:  T(C): 36.6, Max: 36.9 (04-03 @ 05:13)  HR: 78 (62 - 96)  BP: 167/67 (146/75 - 168/78)  BP(mean): 108 (108 - 108)  ABP: --  RR: 18 (18 - 20)  SpO2: 93% (88% - 96%)  Height (cm): 180.3  Weight (kg): 138.8  BMI (kg/m2): 42.7      Physical Exam:  General: NAD, male appearing stated age  Neuro: Awake, alert and oriented, transiently lethargic   HEENT: NC, AT, MOM  Resp: Unlabored breathing, symmetric chest expansion  GI/Abd: Soft, non-distended, non-tender  Extremities All 4 extremities moving spontaneously  Vascular: bilateral venous stasis dermatitis, left distal plantar chronic wound, no bleeding or erythema    ::::::::::::::::::::::::::::::::::::::::::::::::::::::::::::::::::::::::::::::::::::::::::::::::::::::::::::::::::::::::::::::::::::::::::    Laboratory:                        12.9   8.64  )-----------( 219      (  @ 06:59 )             43.3                         12.8   9.47  )-----------( 230      (  @ 05:30 )             41.8                                  Phos: 11.9 Mg: 3.6  134  |  89  |  144  ----------------------------<  111  5.5   |  22  |  8.29        ,                              Phos: 11.6 Mg: 3.6  136  |  90  |  122  ----------------------------<  106  5.3   |  24  |  7.94          -03   TPro 7.6 / Alb 3.3 / TBili 0.5 / DBili x  / AST/AST 15/5<L> / AlkPhos 107  -   TPro 7.3 / Alb 3.3 / TBili 0.4 / DBili x  / AST/AST 15/5<L> / AlkPhos 109    PT/INR/PTT - ( @ 06:58) PT: 28.4 sec; INR: 2.43 ratio ; PTT: 86.7 sec        PT/INR/PTT - ( @ 22:53) PT: x    ; INR: x     ; PTT: 35.6 sec          Urinalysis Basic - ( 2022 00:48 )    Color: Light Orange / Appearance: Turbid / S.021 / pH: x  Gluc: x / Ketone: Negative  / Bili: Negative / Urobili: Negative   Blood: x / Protein: 300 mg/dL / Nitrite: Negative   Leuk Esterase: Large / RBC: 20 /hpf /  /HPF   Sq Epi: x / Non Sq Epi: 3 /hpf / Bacteria: Negative        ::::::::::::::::::::::::::::::::::::::::::::::::::::::::::::::::::::::::::::::::::::::::::::::::::::::::::::::::::::::::::::::::::::::::::    Imaging:  < from: Xray Chest 1 View AP/PA (22 @ 21:01) >    IMPRESSION:    Bibasilar patchy airspace disease likely atelectasis.    < end of copied text >  < from: CT Abdomen and Pelvis No Cont (22 @ 19:49) >    IMPRESSION:  No hydronephrosis, nephrolithiasis, or evidence of obstructive uropathy.    Small bilateral pleural effusions, with suggestion of pleural thickening.   Opacity within the right lower lobe with associated mild volume loss,   suggestive of rounded atelectasis.    Right inguinal hernia containing a nonobstructed segment of sigmoid   colon, new since prior examination of 2017.      < end of copied text >      ::::::::::::::::::::::::::::::::::::::::::::::::::::::::::::::::::::::::::::::::::::::::::::::::::::::::::::::::::::::::::::::::::::::::::        COVID-19 PCR: Mathew (2022 00:48)

## 2022-04-03 NOTE — CONSULT NOTE ADULT - SUBJECTIVE AND OBJECTIVE BOX
CARDIOLOGY CONSULT NOTE - DR. SAGASTUME    HPI:  NIGHT HOSPITALIST:   Patient remotely known to me from an admission to [place] in Feb 2017--assigned to me at this point by the ER to admit this 68 y/o M--followed by his nephrologist above--patient with a history of CAD with past multiple PCI, with most recent discharge from [place] in Feb 2022 for non ST elevation MI with LULU of an 85% prox LAD lesion with patient on ASA and now off Plavix per cardiology (only for one month), chronic atrial fibrillation maintained on Pradaxa, essential HTN, type 2 DM previously on oral medications but on insulin, diabetic neuropathy with chronic RIGHT LE venous stasis changes>left, LEFT foot ulcer seen by podiatry, past endarterectomy LEFT CEA in 2014. with patient self referring to the ER following apparently treatment by an urgent care center for an apparent cystitis with hematuria on nitrofurantoin but with patient noting decreased urine output for the past week, and difficulty with B/L coarse tremors for several weeks, with patient having difficulty negotiating his applications on his smart phone (observed by examiner).  Patient with increasing B/L LE oedema and weight gain, with patient with 2 pillow orthopnoea.      Patient was recommended for a Prado but patient refused, patient wishing to review the issue with his daughter.    Patient with past history of COVID-19 in 12/2021 and has received the COVID-19 vaccine x 2.    NO fever, no chills, no rigors.  No diaphoresis.  No back pain, no tearing back pain. (03 Apr 2022 06:29)        now getting line placed for HD   Patient denies any chest pain, dyspnea, palpitations, cough, syncope, exertional symptoms, nausea, abdominal pain, fever, chills,  or rash.      PAST MEDICAL & SURGICAL HISTORY:  HTN (hypertension), benign    HLD (hyperlipidemia)    DM type 2 (diabetes mellitus, type 2)    TIA (transient ischemic attack)    Atrial fibrillation    MI (myocardial infarction)  circa 2014 and 2022.    CAD (coronary artery disease)    Neuropathy    Stage 3 chronic kidney disease    2019 novel coronavirus disease (COVID-19)  12/2021.  Received the COVID-19 vaccine x 2 as of April 2022.    Status post angioplasty with stent  LULU x 3 2/7/2014    S/P carotid endarterectomy  left          PREVIOUS DIAGNOSTIC TESTING:    [ ] Echocardiogram:  [ ]  Catheterization:  [ ] Stress Test:  	    MEDICATIONS:    Home Medications:  allopurinol 100 mg oral tablet: 1 tab(s) orally once a day (03 Apr 2022 06:34)  aspirin 81 mg oral delayed release tablet: 1 tab(s) orally once a day (03 Apr 2022 06:34)  bumetanide 2 mg oral tablet: 1 tab(s) orally once a day (03 Apr 2022 06:34)  insulin glargine 100 units/mL subcutaneous solution: 25 unit(s) subcutaneous once a day (at bedtime) (03 Apr 2022 06:34)  metOLazone 5 mg oral tablet: 1 tab(s) orally 3 times a week (03 Apr 2022 06:34)  metoprolol succinate 50 mg oral tablet, extended release: 2 tab(s) orally once a day (03 Apr 2022 06:34)  NovoLOG 100 units/mL subcutaneous solution: subcutaneous 4 times a day (before meals and at bedtime) (03 Apr 2022 06:34)  NovoLOG FlexPen 100 units/mL injectable solution: 10 unit(s) subcutaneous 3 times a day (03 Apr 2022 06:34)  oxycodone-acetaminophen 5 mg-325 mg oral tablet: 1 tab(s) orally 2 times a day (03 Apr 2022 06:34)  Pradaxa 150 mg oral capsule: 1 cap(s) orally 2 times a day (03 Apr 2022 06:34)  pregabalin 100 mg oral capsule: 1 cap(s) orally 3 times a day (03 Apr 2022 06:34)      MEDICATIONS  (STANDING):  allopurinol 100 milliGRAM(s) Oral daily  aspirin enteric coated 81 milliGRAM(s) Oral daily  atorvastatin 40 milliGRAM(s) Oral at bedtime  cefTRIAXone   IVPB      dextrose 5%. 1000 milliLiter(s) (100 mL/Hr) IV Continuous <Continuous>  dextrose 5%. 1000 milliLiter(s) (50 mL/Hr) IV Continuous <Continuous>  dextrose 50% Injectable 25 Gram(s) IV Push once  dextrose 50% Injectable 12.5 Gram(s) IV Push once  dextrose 50% Injectable 25 Gram(s) IV Push once  diltiazem    milliGRAM(s) Oral daily  glucagon  Injectable 1 milliGRAM(s) IntraMuscular once  insulin lispro (ADMELOG) corrective regimen sliding scale   SubCutaneous three times a day before meals  insulin lispro (ADMELOG) corrective regimen sliding scale   SubCutaneous at bedtime  metoprolol succinate  milliGRAM(s) Oral daily  pregabalin 100 milliGRAM(s) Oral three times a day      FAMILY HISTORY:  Family history of heart disease  father    Family history of CVA  mother        SOCIAL HISTORY:    [x ] Non-smoker  [ ] Smoker  [ ] Alcohol    Allergies    nitrofurantoin (Nephrotoxicity)    Intolerances    	    REVIEW OF SYSTEMS:  CONSTITUTIONAL: No fever, weight loss, or fatigue  EYES: No eye pain, visual disturbances, or discharge  ENMT:  No difficulty hearing, tinnitus, vertigo; No sinus or throat pain  NECK: No pain or stiffness  RESPIRATORY: No cough, wheezing, chills or hemoptysis; + Shortness of Breath  CARDIOVASCULAR: as HPI  GASTROINTESTINAL: No abdominal or epigastric pain. No nausea, vomiting, or hematemesis; No diarrhea or constipation. No melena or hematochezia.  GENITOURINARY: No dysuria, frequency, hematuria, or incontinence  NEUROLOGICAL: No headaches, memory loss, loss of strength, numbness, or tremors  SKIN: No itching, burning, rashes, or lesions   	  [ ] All others negative	  [ ] Unable to obtain    PHYSICAL EXAM:    T(C): 36.6 (04-03-22 @ 09:05), Max: 36.9 (04-03-22 @ 05:13)  HR: 78 (04-03-22 @ 09:05) (62 - 96)  BP: 167/67 (04-03-22 @ 09:05) (146/75 - 168/78)  RR: 18 (04-03-22 @ 09:05) (18 - 20)  SpO2: 93% (04-03-22 @ 09:05) (88% - 96%)  Wt(kg): --  I&O's Summary    Daily Height in cm: 180.34 (02 Apr 2022 14:56)    Daily     Appearance: Normal	  Psychiatry: A & O x 3, Mood & affect appropriate  HEENT:   Normal oral mucosa, PERRL, EOMI	  Lymphatic: No lymphadenopathy  Cardiovascular: Normal S1 S2,RRR, No JVD, No murmurs  Respiratory: Lungs clear to auscultation	  Gastrointestinal:  Soft, Non-tender, + BS	  Skin: No rashes, No ecchymoses, No cyanosis	  Neurologic: Non-focal  Extremities: Normal range of motion, edema b/l chronic skin changes   Vascular: Peripheral pulses palpable 2+ bilaterally    TELEMETRY: 	    ECG:  	can not find in chart   RADIOLOGY:  OTHER: 	  	  LABS:	 	    CARDIAC MARKERS:        proBNP: Serum Pro-Brain Natriuretic Peptide: 99280 pg/mL (04-03 @ 06:58)      Lipid Profile:   HgA1c:   TSH:                           12.9   8.64  )-----------( 219      ( 03 Apr 2022 06:59 )             43.3     04-03    134<L>  |  89<L>  |  144<H>  ----------------------------<  111<H>  5.5<H>   |  22  |  8.29<H>    Ca    8.5      03 Apr 2022 06:58  Phos  11.9     04-03  Mg     3.6     04-03    TPro  7.6  /  Alb  3.3  /  TBili  0.5  /  DBili  x   /  AST  15  /  ALT  5<L>  /  AlkPhos  107  04-03    PT/INR - ( 03 Apr 2022 06:58 )   PT: 28.4 sec;   INR: 2.43 ratio         PTT - ( 03 Apr 2022 06:58 )  PTT:86.7 sec    Creatinine, Serum: 8.29 mg/dL (04-03-22 @ 06:58)  Creatinine, Serum: 7.94 mg/dL (04-03-22 @ 05:30)  Creatinine, Serum: 7.44 mg/dL (04-02-22 @ 18:27)        ASSESSMENT/PLAN:

## 2022-04-03 NOTE — H&P ADULT - HISTORY OF PRESENT ILLNESS
NIGHT HOSPITALIST:   Patient remotely known to me from an admission to [place] in Feb 2017--assigned to me at this point by the ER to admit this 68 y/o M--followed by his nephrologist above--patient with a history of CAD with past multiple PCI, with most recent discharge from [place] in Feb 2022 for non ST elevation MI with LULU of an 85% prox LAD lesion with patient on ASA and now off Plavix per cardiology (only for one month), chronic atrial fibrillation maintained on Pradaxa, essential HTN, type 2 DM previously on oral medications but on insulin, diabetic neuropathy with chronic RIGHT LE venous stasis changes>left, LEFT foot ulcer seen by podiatry, past endarterectomy LEFT CEA in 2014. with patient self referring to the ER following apparently treatment by an urgent care center for an apparent cystitis with hematuria on nitrofurantoin but with patient noting decreased urine output for the past week, and difficulty with B/L coarse tremors for several weeks, with patient having difficulty negotiating his applications on his smart phone (observed by examiner).  Patient with increasing B/L LE oedema and weight gain, with patient with 2 pillow orthopnoea.      Patient was recommended for a Prado but patient refused, patient wishing to review the issue with his daughter.    Patient with past history of COVID-19 in 12/2021 and has received the COVID-19 vaccine x 2.    NO fever, no chills, no rigors.  No diaphoresis.  No back pain, no tearing back pain.

## 2022-04-03 NOTE — H&P ADULT - NSHPADDITIONALINFOADULT_GEN_ALL_CORE
NIGHT HOSPITALIST:    Patient/ daughter aware of course and agree with plan/care as above.  Given patient's comorbidities, patient's long term prognosis is guarded.   Emotional support provided to patient/ daughter.  Care reviewed with covering NP/PA for endorsement to my physician colleagues.    Omid Eller MD

## 2022-04-03 NOTE — H&P ADULT - NSHPLABSRESULTS_GEN_ALL_CORE
WBC 7.6  normal differential.    Hgb 13.3    Platelets of 225K>    Random glucose of 99.    Cr 7.4 (from 2.2) in Feb 2022.    K+ 5.0  HCO3 24    Na+ 133.    UA with large leukocyte esterase, .    COVID-19 PCR>>negative.  Chest radiograph reviewed with no infiltrate or effusion.    EKG tracing reviewed with atrial fibrillation at 66.    CTT abdomen no contrast with NO hydro, no nephrolithiasis or obstructive uropathy, small bilateral pleural effusions, suggestion of pleural thickening, RIGHT lower lobe rounded atelectasis, RIGHT inguinal hernia non obstructed segment of sigmoid, new since 2017. WBC 7.6  normal differential.    Hgb 13.3    Platelets of 225K>    Random glucose of 99.    Cr 7.4 (from 2.2) in Feb 2022.    K+ 5.0  HCO3 24    Na+ 133.    UA with large leukocyte esterase, .    COVID-19 PCR>>negative.  Chest radiograph reviewed with no infiltrate or effusion.    EKG tracing reviewed with atrial fibrillation at 66.    Echo from Feb 2022 with normal LV function.  Mildly dilated LA.    CTT abdomen no contrast with NO hydro, no nephrolithiasis or obstructive uropathy, small bilateral pleural effusions, suggestion of pleural thickening, RIGHT lower lobe rounded atelectasis, RIGHT inguinal hernia non obstructed segment of sigmoid, new since 2017.

## 2022-04-03 NOTE — H&P ADULT - PROBLEM SELECTOR PLAN 2
See above.   Renal function at present precludes Pradaxa and heparin gtt provided as a bridge for full AC.  Patient/ daughter agree to continue with full AC and aware of risks/benefits.

## 2022-04-03 NOTE — CONSULT NOTE ADULT - ASSESSMENT
A/P    66 y/o M with history of CAD, s/p multiple PCI, with most recent 2/22 PCI of LAD in setting of NSTEMI, on ASA only (pradaxa), chronic atrial fibrillation maintained on Pradaxa, essential HTN, type 2 DM previously on oral medications but on insulin, diabetic neuropathy with chronic RIGHT LE venous stasis changes>left, LEFT foot ulcer seen by podiatry, past endarterectomy LEFT CEA in 2014 presenting with 1 week of decreased urine output, cystitis with hematuria     #ANT on CKD  -oliguric renal failure in setting of ? UTI/cystitis, r/o obstruction  -? secondary to abx   -worsened with diuretic use but unlikely due to hypovolemia alone from diuretic use   -r/o bacteremia, r/o other infection ?leg cellulitis  - i  called id dr kelsey veliz   -await New HD for uremia, renal failure  -hopefully HD only temporary  -trend ecg, tele with hyperkalemia    #Acute on chronic HFpEF  -clinically overloaded, hypervolemic  -but not decompensated, laying supine and tolerating line placement in ed  -volume removal with HD  -iv bumex     #CAD, s/p PCI, most recent 2/2022  -stable, cont ASA, off plavix due to pradaxa indication  -statin    #Chronic AF  -stable   -cont metoprolol, dilt as ordered  -IV heparin post hd access for a/c  -inr elevated in light of pradaxa use, ant  -would start iv heparin tomorrow AM after pradaxa washout    #R carotid stenosis  -cont med tx  -MRA noted with Greater than 75% stenosis of the right distal common carotid artery. Approximately 60% stenosis of the proximal left internal carotid artery.  -Continue ASA and Statin Therapy  -vasc outpt f/u Dr Pinto      70 minutes spent on total encounter; more than 50% of the visit was spent counseling and/or coordinating care by the attending physician.      ACP- Advanced Care Planning  -Advanced care planning discussed with patient. Advanced care planning forms discussed with patient and/or family.  Risks, benefits, and alternatives of medical/cardiac procedures were discussed in detail with all questions answered.  30 minutes were spent addressing advance care planning.      d/w dtr in detail at bedside

## 2022-04-03 NOTE — PROGRESS NOTE ADULT - SUBJECTIVE AND OBJECTIVE BOX
[facility]   [phone]  RENAL FOLLOW UP NOTE  --------------------------------------------------------------------------------  Chief Complaint:    24 hour events/subjective:    on heparin drip. states has scant urine output     PAST HISTORY  --------------------------------------------------------------------------------  No significant changes to PMH, PSH, FHx, SHx, unless otherwise noted    ALLERGIES & MEDICATIONS  --------------------------------------------------------------------------------  Allergies    nitrofurantoin (Nephrotoxicity)    Intolerances      Standing Inpatient Medications  allopurinol 100 milliGRAM(s) Oral daily  aspirin enteric coated 81 milliGRAM(s) Oral daily  atorvastatin 40 milliGRAM(s) Oral at bedtime  cefTRIAXone   IVPB      dextrose 5%. 1000 milliLiter(s) IV Continuous <Continuous>  dextrose 5%. 1000 milliLiter(s) IV Continuous <Continuous>  dextrose 50% Injectable 25 Gram(s) IV Push once  dextrose 50% Injectable 12.5 Gram(s) IV Push once  dextrose 50% Injectable 25 Gram(s) IV Push once  diltiazem    milliGRAM(s) Oral daily  glucagon  Injectable 1 milliGRAM(s) IntraMuscular once  heparin  Infusion.  Unit(s)/Hr IV Continuous <Continuous>  insulin lispro (ADMELOG) corrective regimen sliding scale   SubCutaneous three times a day before meals  insulin lispro (ADMELOG) corrective regimen sliding scale   SubCutaneous at bedtime  metoprolol succinate  milliGRAM(s) Oral daily  pregabalin 100 milliGRAM(s) Oral three times a day    PRN Inpatient Medications  dextrose Oral Gel 15 Gram(s) Oral once PRN  heparin   Injectable 79298 Unit(s) IV Push every 6 hours PRN  heparin   Injectable 5000 Unit(s) IV Push every 6 hours PRN  oxycodone    5 mG/acetaminophen 325 mG 1 Tablet(s) Oral every 12 hours PRN      REVIEW OF SYSTEMS  --------------------------------------------------------------------------------    Gen: denies fevers/chills, + fatigue   CVS: denies chest pain/palpitations  Resp: denies SOB/Cough  GI: Denies N/V/Abd pain  :  no uo no dysuria       VITALS/PHYSICAL EXAM  --------------------------------------------------------------------------------  T(C): 36.6 (04-03-22 @ 09:05), Max: 36.9 (04-03-22 @ 05:13)  HR: 78 (04-03-22 @ 09:05) (62 - 96)  BP: 167/67 (04-03-22 @ 09:05) (146/75 - 168/78)  RR: 18 (04-03-22 @ 09:05) (18 - 20)  SpO2: 93% (04-03-22 @ 09:05) (88% - 96%)  Wt(kg): --  Height (cm): 180.3 (04-02-22 @ 14:56)  Weight (kg): 138.8 (04-02-22 @ 14:56)  BMI (kg/m2): 42.7 (04-02-22 @ 14:56)  BSA (m2): 2.53 (04-02-22 @ 14:56)      Physical Exam:  	    Physical Exam:  	Gen: alert slow to response but alert and oriented obese   	Pulm: Decreased breath sounds b/l bases. no rales or ronchi or wheezing  	CV: RRR, S1/S2. no rub  	Abd: +BS, soft, nontender distended   	: No suprapubic tenderness.               Extremity: + hyperpigmented bilateral LE with LLE erythema and increase warmth  	Neuro: alert but slow to response  + asterixis       LABS/STUDIES  --------------------------------------------------------------------------------              12.9   8.64  >-----------<  219      [04-03-22 @ 06:59]              43.3     134  |  89  |  144  ----------------------------<  111      [04-03-22 @ 06:58]  5.5   |  22  |  8.29        Ca     8.5     [04-03-22 @ 06:58]      Mg     3.6     [04-03-22 @ 06:58]      Phos  11.9     [04-03-22 @ 06:58]    TPro  7.6  /  Alb  3.3  /  TBili  0.5  /  DBili  x   /  AST  15  /  ALT  5   /  AlkPhos  107  [04-03-22 @ 06:58]    PT/INR: PT 28.4 , INR 2.43       [04-03-22 @ 06:58]  PTT: 86.7       [04-03-22 @ 06:58]      Creatinine Trend:  SCr 8.29 [04-03 @ 06:58]  SCr 7.94 [04-03 @ 05:30]  SCr 7.44 [04-02 @ 18:27]              Urinalysis - [04-03-22 @ 00:48]      Color Light Orange / Appearance Turbid / SG 1.021 / pH 5.5      Gluc Negative / Ketone Negative  / Bili Negative / Urobili Negative       Blood Large / Protein 300 mg/dL / Leuk Est Large / Nitrite Negative      RBC 20 /  / Hyaline 10 / Gran  / Sq Epi  / Non Sq Epi 3 / Bacteria Negative    Urine Creatinine 153      [04-03-22 @ 00:48]  Urine Protein 71      [04-03-22 @ 00:48]  Urine Sodium 11      [04-03-22 @ 00:48]  Urine Chloride <20      [04-03-22 @ 00:48]    HbA1c 11.7      [11-07-19 @ 09:14]        < from: CT Abdomen and Pelvis No Cont (04.02.22 @ 19:49) >    ACC: [id number] EXAM:  CT ABDOMEN AND PELVIS                          PROCEDURE DATE:  04/02/2022          INTERPRETATION:  CLINICAL INFORMATION: Decreased voiding x1 week. Recent   hematuria.    COMPARISON: CT abdomen/pelvis of 2/7/2017.    CONTRAST/COMPLICATIONS:  IV Contrast: None.  Oral Contrast: None.  Complications: None reported at time of study completion.    PROCEDURE:  CT of the Abdomen and Pelvis was performed.  Sagittal and coronal reformats were performed.    FINDINGS:  LOWER CHEST: Coronary and aortic valvular calcification. Partially imaged   bilateral pleural effusions, with suggestion of pleural thickening.   Opacity within the right lower lobe, may represent rounded atelectasis.   Scattered bilateral calcified granulomas.    LIVER: Within normal limits.  BILE DUCTS: Normal caliber.  GALLBLADDER: Within normal limits.  SPLEEN: Within normal limits.  PANCREAS: Within normal limits.  ADRENALS: Within normal limits.  KIDNEYS/URETERS: No hydronephrosis or nephrolithiasis. Nonspecific   symmetric bilateral perinephric stranding.    BLADDER: Minimally distended.  REPRODUCTIVE ORGANS: Prostate within normal limits.    BOWEL: No bowel obstruction. Colonic diverticulosis. Appendix is not   visualized. No evidence of inflammation in the pericecal region.  PERITONEUM: No ascites.  VESSELS: Atherosclerotic changes. Left-sided IVC.  RETROPERITONEUM/LYMPH NODES: No lymphadenopathy.  ABDOMINAL WALL: Right inguinal hernia containing a nonobstructed loop of   sigmoid colon. Dome of the urinary bladder courses towards the hernia   orifice. Small fat-containing left inguinal hernia.  BONES: Degenerative changes.    IMPRESSION:  No hydronephrosis, nephrolithiasis, or evidence of obstructive uropathy.    Small bilateral pleural effusions, with suggestion of pleural thickening.   Opacity within the right lower lobe with associated mild volume loss,   suggestive of rounded atelectasis.    Right inguinal hernia containing a nonobstructed segment of sigmoid   colon, new since prior examination of 2/7/2017.      < end of copied text >

## 2022-04-03 NOTE — H&P ADULT - NSHPSOURCEINFOTX_GEN_ALL_CORE
Reviewed [facility] with patient with discharge from Feb 2022.  Daughter, Lucia Stevens, [phone], updated with patient's permission. Reviewed Medex with patient with discharge from Feb 2022.  Daughter, Lucia Stevens, [phone], updated with patient's permission.  [place] [employer] # [phone]

## 2022-04-03 NOTE — H&P ADULT - NSICDXPASTMEDICALHX_GEN_ALL_CORE_FT
PAST MEDICAL HISTORY:  2019 novel coronavirus disease (COVID-19) 12/2021.  Received the COVID-19 vaccine x 2 as of April 2022.    Atrial fibrillation     CAD (coronary artery disease)     DM type 2 (diabetes mellitus, type 2)     HLD (hyperlipidemia)     HTN (hypertension), benign     MI (myocardial infarction) circa 2014 and 2022.    Neuropathy     Stage 3 chronic kidney disease     TIA (transient ischemic attack)

## 2022-04-03 NOTE — CONSULT NOTE ADULT - ASSESSMENT
Patient is a 67 year old male with PMH of CAD with past multiple PCI, with most recent discharge from [place] in Feb 2022 for NSTEMI with LULU of an 85% prox LAD lesion with patient on ASA and now off Plavix per cardiology (only for one month), chronic afib on Pradaxa, HTN, type 2 DM on insulin, diabetic neuropathy with chronic RIGHT LE venous stasis changes>left, LEFT foot ulcer seen by podiatry, past endarterectomy LEFT CEA in 2014 who presented with UTI with hematuria diagnosed at urgent care and now with decreased urine output.     Acute cystitis/UTI    - noted dark urine with hematuria, went to UC and was prescribed macrobid, had some improvement in urine color but then noticed decreased urine output with no voiding reported in last 2 days   - UA here with pyuria -  with large LE, negative nitrites and no bacteria    - CTAP reviewed - no hydronephrosis, nephrolithiasis, or evidence of obstructive uropathy   - follow urine and blood cultures    - continue on ceftriaxone (no renal adjusted needed) pending cultures    - monitor temps/WBC    ANT on CKD3   - Nephrology following, pt uremic   - femoral shiley placed and starting on HD today   - monitor Cr, renally dose meds     B/l LE edema with chronic venous stasis  Acute on chronic HFpEF   - legs cool to touch, nontender, chronic changes with no sign of infection/cellulitis   - has wound on bottom of L foot - dry and does not appear infected either   - clinically overloaded, Cardiology and Nephrology following, on IV bumex, for HD   - elevate lower extremities      DM2 with neuropathy   - Blood glucose management per primary team.      D/w daughter at bedside, RN at bedside  Daniel Cuevas M.D.  [facility], Division of Infectious Diseases  [phone]  After 5pm on weekdays and all day on weekends - please call [phone]

## 2022-04-03 NOTE — H&P ADULT - ASSESSMENT
NIGHT HOSPITALIST:   NIGHT HOSPITALIST:   Oliguria in this patient with recent nitrofurantoin use (now listed as an allergy) with acute over chronic renal failure>>patient REFUSING [facility] placement and the patient wishes to review the issue with his daughter--in the setting of patient with a complex medical history of CAD, multiple past PCI with most recent PCI in Feb 2022 following a non ST elevation MI, type 2 DM on insulin with complicating peripheral neuropathy, past CVA and LEFT CEA with no residual deficits, chronic RIGHT>left induration with past poorly healing ulcers, chronic atrial fibrillation on Pradaxa (with present renal function will start patient on a heparin gtt bridge with no bolus--patient/daughter agree to continue with full AC for atrial fibrillation).    Patient advised to avoid use of safety razors for shaving--patient refuses with patient noting that the electric razor "does not do the job."    S/P dose of IV Bumex in the ER>>will defer to nephrology further dosing of diuretics.   Possible anticipated renal replacement therapy.    Will provide heparin gtt as bridge for atrial fibrillation, with patient's renal status precludes continued use of Pradaxa.    Patient/ daughter agree with empiric treatment of cystitis with IV Rocephin pending urine culture isolate.    Will provide FS S/S for now given change in renal status.   Would review patient's bedtime Lantus requirements, if any, for later today.

## 2022-04-03 NOTE — H&P ADULT - EXTREMITIES COMMENTS
RIGHT LE>left LE with diffuse induration, no crepitus.  3+++ B/L LE oedema.  Poor nail hygiene.  No overt ulcers appreciated.

## 2022-04-03 NOTE — H&P ADULT - PROBLEM SELECTOR PLAN 4
See above.   FS S/S >>would review patient's bedtime Lantus requirements this AM to avoid hypoglycemia given renal azotemia.

## 2022-04-03 NOTE — PROGRESS NOTE ADULT - ASSESSMENT
Mr. Stevens is a 67 year old gentleman with PMH of CAD, NSTEMI s/p 3 stents in 2014 (stents to LAD, proximal and distal LCx), ischemic cardiomyopathy, HTN for 10 years, T2DM for 26 years c/b peripheral neuropathy, CVA, TIA, Afib on Pradaxa, chronic RLE wound, L carotid stenosis s/p endarterectomy (2014) just recently admitted  to the [facility] on 2/19/2022 with acute onset chest pain for one day found to have an NSTEMI, CAD, s/p PCI in setting of increased AF rates off bb for 3 days and resolved chest pain, his CKMB peaked and Echo noted with EF 60%,normal LV function, mild TR, mild MN. He was s/p [facility] with 85% proximal LAD s/p balloon angioplasty and LULU. He presented to [facility] ED with decreased urine output over the week. [facility]. [facility] went to [facility] for hematuria and was started  on Nitrofurantoin. Pt is still taking Nitrofurantoin w/ 5 days left. He came to the ED due to worsening symptoms. Pt reports having a similar incident 4-5 years ago that resolved spontaneously. He reports increased leg edema Dyspnea worse on exertion. his baseline Serum creatinine is in the 2.0 to 2.3 mg/dl range. ANT with serum creatinine of 8.29 with bun 144 and k 5.5        1- ANT on ckd III   2- chf chronic   3- hyperkalemia   4- uremia  5- hyperphosphatemia       he is uremic at present. requires urgent HD.   etiology of ANT ? infection and +/- volume depletion   to have pro bnp   pt is uremic therefore will proceed with HD    hd explained informed consent obtained witnessed and placed in chart  Ucx/bcx  no hydro on ct abd  add renvela one tab with meals  to receive ffp if vascular requires reversal of INR   lokelma 10 g po   strict I/O  d/w NP   HD today  d/w pt daughter and updated

## 2022-04-04 NOTE — CONSULT NOTE ADULT - SUBJECTIVE AND OBJECTIVE BOX
PULMONARY CONSULT    HPI: 68 y/o M with PMH of pleural effusion (2019 s/p R thoracentesis, then R CT placement with course c/b R hydroPTX - tx to [facility] for possible VATs decortication which was deferred), CAD, NSTEMI s/p stents ( stents to LAD, proximal and distal LCx, and additional LULU to proximal LAD 2022), ischemic cardiomyopathy, HTN, T2DM c/b peripheral neuropathy, CVA, TIA, Afib on Pradaxa, chronic RLE wound, L carotid stenosis s/p endarterectomy (). Presented to ED with hematuria and decreased urine output over the past week, was prescribed Nitrofurantoin from an urgent care center. Also with increase in B/L LE edema, weight gain, and orthopnea. On admission, labs notable for creatinine of 7.44 (noted to be 2.2 in 2022) and proBNP of 14,000, urgent HD initiated 2nd to uremia. Pulmonary called to consult for mild SOB, hx of abnormal CT chest. Never smoker. Denies inhaler use at home. Endorses mild SOB which has been improving since admission. Denies CP, cough, sputum production, fever, chills. O2 sats 94-95% on 2LNC.     PAST MEDICAL & SURGICAL HISTORY:  HTN (hypertension), benign  HLD (hyperlipidemia)  DM type 2 (diabetes mellitus, type 2)  TIA (transient ischemic attack)  Atrial fibrillation  MI (myocardial infarction)  circa  and .  CAD (coronary artery disease)  Neuropathy  Stage 3 chronic kidney disease  2019 novel coronavirus disease (COVID-19) 2021  Status post angioplasty with stent  S/P L carotid endarterectomy    Allergies  nitrofurantoin (Nephrotoxicity)    FAMILY HISTORY:  Family history of heart disease  Family history of CVA    Social history: never smoker     Review of Systems:  CONSTITUTIONAL: No fever, chills, or fatigue  EYES: No eye pain, visual disturbances, or discharge  ENMT:  No difficulty hearing, tinnitus, vertigo; No sinus or throat pain  NECK: No pain or stiffness  RESPIRATORY: Per above  CARDIOVASCULAR: Per above, +orthopnea and LE edema  GASTROINTESTINAL: No abdominal or epigastric pain. No nausea, vomiting, or hematemesis; No diarrhea or constipation. No melena or hematochezia.  GENITOURINARY: Per above  NEUROLOGICAL: No headaches, memory loss, loss of strength, numbness, or tremors  SKIN: No itching, burning, rashes, or lesions   MUSCULOSKELETAL: No joint pain or swelling; No muscle, back, or extremity pain  PSYCHIATRIC: No depression, anxiety, mood swings, or difficulty sleeping    Medications:  MEDICATIONS  (STANDING):  allopurinol 100 milliGRAM(s) Oral daily  aspirin enteric coated 81 milliGRAM(s) Oral daily  atorvastatin 40 milliGRAM(s) Oral at bedtime  cefTRIAXone   IVPB 1000 milliGRAM(s) IV Intermittent every 24 hours  cefTRIAXone   IVPB      chlorhexidine 2% Cloths 1 Application(s) Topical daily  dextrose 5%. 1000 milliLiter(s) (100 mL/Hr) IV Continuous <Continuous>  dextrose 5%. 1000 milliLiter(s) (50 mL/Hr) IV Continuous <Continuous>  dextrose 50% Injectable 25 Gram(s) IV Push once  dextrose 50% Injectable 12.5 Gram(s) IV Push once  dextrose 50% Injectable 25 Gram(s) IV Push once  diltiazem    milliGRAM(s) Oral daily  glucagon  Injectable 1 milliGRAM(s) IntraMuscular once  insulin lispro (ADMELOG) corrective regimen sliding scale   SubCutaneous three times a day before meals  insulin lispro (ADMELOG) corrective regimen sliding scale   SubCutaneous at bedtime  metoprolol succinate  milliGRAM(s) Oral daily  pregabalin 100 milliGRAM(s) Oral three times a day    MEDICATIONS  (PRN):  dextrose Oral Gel 15 Gram(s) Oral once PRN Blood Glucose LESS THAN 70 milliGRAM(s)/deciliter  oxycodone    5 mG/acetaminophen 325 mG 2 Tablet(s) Oral every 6 hours PRN Severe Pain (7 - 10)  oxycodone    5 mG/acetaminophen 325 mG 1 Tablet(s) Oral every 6 hours PRN Moderate Pain (4 - 6)    Vital Signs Last 24 Hrs  T(C): 36.5 (2022 12:40), Max: 37.1 (2022 15:30)  T(F): 97.7 (2022 12:40), Max: 98.7 (2022 15:30)  HR: 90 (2022 12:40) (73 - 98)  BP: 128/68 (2022 12:40) (105/60 - 166/80)  BP(mean): 80 (2022 12:40) (80 - 84)  RR: 18 (2022 12:40) (18 - 21)  SpO2: 95% (2022 12:40) (91% - 95%)    VBG pH 7.27  @ 20:53  VBG pCO2 63  @ 20:53  VBG O2 sat 44.6  @ 20:53  VBG lactate 0.9  @ 20:53    - @ 07:01  -  - @ 07:00  --------------------------------------------------------  IN: 900 mL / OUT: 1300 mL / NET: -400 mL    LABS:                        12.5   11.04 )-----------( 210      ( 2022 06:27 )             41.8     04-04    134<L>  |  91<L>  |  119<H>  ----------------------------<  174<H>  5.9<H>   |  17<L>  |  8.51<H>    Ca    8.3<L>      2022 06:27  Phos  12.4     04-04  Mg     3.4     04-04    TPro  7.6  /  Alb  3.3  /  TBili  0.5  /  DBili  x   /  AST  15  /  ALT  5<L>  /  AlkPhos  107  04-03    CAPILLARY BLOOD GLUCOSE  POCT Blood Glucose.: 138 mg/dL (2022 12:05)    PT/INR - ( 2022 06:58 )   PT: 28.4 sec;   INR: 2.43 ratio    PTT - ( 2022 18:56 )  PTT:83.1 sec  Urinalysis Basic - ( 2022 00:48 )    Color: Light Orange / Appearance: Turbid / S.021 / pH: x  Gluc: x / Ketone: Negative  / Bili: Negative / Urobili: Negative   Blood: x / Protein: 300 mg/dL / Nitrite: Negative   Leuk Esterase: Large / RBC: 20 /hpf /  /HPF   Sq Epi: x / Non Sq Epi: 3 /hpf / Bacteria: Negative    Serum Pro-Brain Natriuretic Peptide: 40462 pg/mL (22 @ 18:56)  Serum Pro-Brain Natriuretic Peptide: 18103 pg/mL (22 @ 06:58)    CULTURES:    Culture - Urine (collected 22 @ 04:11)  Source: Clean Catch Clean Catch (Midstream)  Final Report (22 @ 12:00):    <10,000 CFU/mL Normal Urogenital Tiffany    Physical Examination:    General: No acute distress.      HEENT: Pupils equal, reactive to light.  Symmetric.    PULM: Decreased BS    CVS: RRR    ABD: Soft, nondistended, nontender, normoactive bowel sounds, no masses    EXT: B/L LE Chronic venous changes    SKIN: Warm and well perfused, no rashes noted.    NEURO: Alert, oriented, interactive, nonfocal    RADIOLOGY REVIEWED  CXR: b/l patchy opacities    CT chest: < from: CT Chest No Cont (22 @ 16:45) >  FINDINGS:    AIRWAYS, LUNGS and PLEURA: Patent central airways. Severe bibasilar   pleural thickening and rounded atelectasis within basilar lower lobes.   Atelectasis of inferior segment of lingula. Scattered small calcified   granulomas. Trace left pleural effusion. No pneumothorax.    MEDIASTINUM AND STEPHON: Numerous subcentimeter mediastinal lymph nodes are   unchanged.    HEART AND VESSELS: Cardiomegaly. Coronary and aortic calcifications. No   pericardial effusion. Thoracic aorta normal in diameter. Dilated   pulmonary arteries suggestive of pulmonary hypertension.    VISUALIZED UPPER ABDOMEN: Bilateral perinephric fat stranding is   unchanged. Aortic calcification.    CHEST WALL AND BONES: No aggressive osseous lesion.    LOWER NECK: Within normal limits.    IMPRESSION:    Severe bibasilar pleural thickening and rounded atelectasis within   basilar lower lobes. Atelectasis of inferior segment of lingula. Trace   left pleural effusion. No pneumothorax.    --- End of Report ---    < end of copied text >  < from: CT Abdomen and Pelvis No Cont (22 @ 19:49) >  PROCEDURE:  CT of the Abdomen and Pelvis was performed.  Sagittal and coronal reformats were performed.    FINDINGS:  LOWER CHEST: Coronary and aortic valvular calcification. Partially imaged   bilateral pleural effusions, with suggestion of pleural thickening.   Opacity within the right lower lobe, may represent rounded atelectasis.   Scattered bilateral calcified granulomas.    LIVER: Within normal limits.  BILE DUCTS: Normal caliber.  GALLBLADDER: Within normal limits.  SPLEEN: Within normal limits.  PANCREAS: Within normal limits.  ADRENALS: Within normal limits.  KIDNEYS/URETERS: No hydronephrosis or nephrolithiasis. Nonspecific   symmetric bilateral perinephric stranding.    BLADDER: Minimally distended.  REPRODUCTIVE ORGANS: Prostate within normal limits.    BOWEL: No bowel obstruction. Colonic diverticulosis. Appendix is not   visualized. No evidence of inflammation in the pericecal region.  PERITONEUM: No ascites.  VESSELS: Atherosclerotic changes. Left-sided IVC.  RETROPERITONEUM/LYMPH NODES: No lymphadenopathy.  ABDOMINAL WALL: Right inguinal hernia containing a nonobstructed loop of   sigmoid colon. Dome of the urinary bladder courses towards the hernia   orifice. Small fat-containing left inguinal hernia.  BONES: Degenerative changes.    IMPRESSION:  No hydronephrosis, nephrolithiasis, or evidence of obstructive uropathy.    Small bilateral pleural effusions, with suggestion of pleural thickening.   Opacity within the right lower lobe with associated mild volume loss,   suggestive of rounded atelectasis.    Right inguinal hernia containing a nonobstructed segment of sigmoid   colon, new since prior examination of 2017.    --- End of Report ---    TTE: < from: TTE with Doppler (w/Cont) (22 @ 10:58) >  Dimensions:    Normal Values:  LA:     4.4    2.0 - 4.0 cm  Ao:     4.0    2.0 - 3.8 cm  SEPTUM: 1.1    0.6 - 1.2 cm  PWT:    0.9    0.6 - 1.1 cm  LVIDd:  5.0    3.0 - 5.6 cm  LVIDs:  3.5    1.8 - 4.0 cm  Derived variables:  LVMI: 73 g/m2  RWT: 0.36  EF (Visual Estimate): 60 %  Doppler Peak Velocity (m/sec): AoV=1.5 TV=2.2  ------------------------------------------------------------------------  Observations:  Mitral Valve: Normal mitral valve.  Aortic Valve/Aorta: Calcified aortic valve with normal  opening. Minimal aortic regurgitation.  Normal aortic root size.  Left Atrium: Mildly dilated left atrium.  Left Ventricle: Endocardial visualization enhanced with  intravenous injection of Ultrasonic Enhancing Agent  (Definity).  Normal left ventricular internal dimensions and wall  thickness.  Normal left ventricular systolic function. No segmental  wall motion abnormalities.  Right Heart: Normal right atrium. Normal right ventricular  size and function.  Normal tricuspid valve. Mild tricuspid regurgitation.  Normal pulmonic valve. Mild pulmonic regurgitation.  Pericardium/Pleura: Normal pericardium with no pericardial  effusion.  Hemodynamic: No evidenceof pulmonary hypertension.  ------------------------------------------------------------------------  Conclusions:  Endocardial visualization enhanced with intravenous  injection of Ultrasonic Enhancing Agent (Definity).  Normal left ventricular systolic function. No segmental  wall motion abnormalities.    < end of copied text >

## 2022-04-04 NOTE — CONSULT NOTE ADULT - ASSESSMENT
66 y/o M with PMH of pleural effusion (2019 s/p R thoracentesis, then R CT placement with course c/b R hydroPTX - tx to LI for possible VATs decortication which was deferred), CAD, NSTEMI s/p stents (2014 stents to LAD, proximal and distal LCx, and additional LULU to proximal LAD 2/2022), ischemic cardiomyopathy, HTN, T2DM c/b peripheral neuropathy, CVA, TIA, Afib on Pradaxa, chronic RLE wound, L carotid stenosis s/p endarterectomy (2014). Presented to ED with hematuria and decreased urine output over the past week, was prescribed Nitrofurantoin from an urgent care center. Also with increase in B/L LE edema, weight gain, and orthopnea. On admission, labs notable for creatinine of 7.44 (noted to be 2.2 in 2/2022) and proBNP of 14,000, urgent HD initiated 2nd to uremia. Pulmonary called to consult for mild SOB, hx of abnormal CT chest. O2 sats 94-95% on 2LNC.

## 2022-04-04 NOTE — PROGRESS NOTE ADULT - ASSESSMENT
·  Problem: Acute kidney injury superimposed on CKD. pt currently on hd  to cont as per renal         Problem/Plan - 2:  ·  Problem: Chronic atrial fibrillation. c/w a/c  cards f/u     Problem/Plan - 3:  ·  Problem: Cystitis. f/u culture.     Problem/Plan - 4:  ·  Problem: Type 2 diabetes mellitus. c/w insulin   endo f/ui     Problem/Plan - 5:  ·  Problem: CAD (coronary artery disease).   ·  Plan: See above.   Presently clinically stable.    discussed w/ daughter / wife

## 2022-04-04 NOTE — PROGRESS NOTE ADULT - NS ATTEND AMEND GEN_ALL_CORE FT
seen on hd   overall remains lethargic   heart RRR   lungs decrease bs   ext edema      ANT/uremia   hd as planned above  see hd flow sheet

## 2022-04-04 NOTE — PROGRESS NOTE ADULT - ASSESSMENT
A/P    66 y/o M with history of CAD, s/p multiple PCI, with most recent 2/22 PCI of LAD in setting of NSTEMI, on ASA only (pradaxa), chronic atrial fibrillation maintained on Pradaxa, essential HTN, type 2 DM previously on oral medications but on insulin, diabetic neuropathy with chronic RIGHT LE venous stasis changes>left, LEFT foot ulcer seen by podiatry, past endarterectomy LEFT CEA in 2014 presenting with 1 week of decreased urine output, cystitis with hematuria     #ANT on CKD  -oliguric renal failure in setting of ? UTI/cystitis, r/o obstruction  -? secondary to abx   -worsened with diuretic use but unlikely due to hypovolemia alone from diuretic use   -r/o bacteremia, r/o other infection ?leg cellulitis  - UA+ , Ucx noted -iv abx per id   -New HD for uremia, renal failure  -hopefully HD only temporary  -trend ecg, tele with hyperkalemia  -renal f/u     #Acute on chronic HFpEF  -remains clinically overloaded, hypervolemic  -but not decompensated, laying supine and tolerating line placement in ed  -volume removal with HD  -bumex per renal     #CAD, s/p PCI, most recent 2/2022  -stable, cont ASA, off plavix due to pradaxa indication  -statin    #Chronic AF  -stable   -cont metoprolol, dilt as ordered  -IV heparin post hd access for a/c  -inr elevated in light of pradaxa use, ant  -pending INR today -- plan to start iv heparin     #R carotid stenosis  -cont med tx  -MRA noted with Greater than 75% stenosis of the right distal common carotid artery. Approximately 60% stenosis of the proximal left internal carotid artery.  -Continue ASA and Statin Therapy  -vasc outpt f/u Dr Pinto    ACP- Advanced Care Planning  -Advanced care planning discussed with patient. Advanced care planning forms discussed with patient and/or family.  Risks, benefits, and alternatives of medical/cardiac procedures were discussed in detail with all questions answered.  30 minutes were spent addressing advance care planning.        plan discussed with ACP and wife at bedside  A/P    66 y/o M with history of CAD, s/p multiple PCI, with most recent 2/22 PCI of LAD in setting of NSTEMI, on ASA only (pradaxa), chronic atrial fibrillation maintained on Pradaxa, essential HTN, type 2 DM previously on oral medications but on insulin, diabetic neuropathy with chronic RIGHT LE venous stasis changes>left, LEFT foot ulcer seen by podiatry, past endarterectomy LEFT CEA in 2014 presenting with 1 week of decreased urine output, cystitis with hematuria     #ANT on CKD  -oliguric renal failure in setting of ? UTI/cystitis, r/o obstruction  -? secondary to abx   -worsened with diuretic use but unlikely due to hypovolemia alone from diuretic use   -r/o bacteremia, r/o other infection ?leg cellulitis  - UA+ , Ucx noted -iv abx per id   -New HD for uremia, renal failure  -hopefully HD only temporary  -trend ecg, tele with hyperkalemia  -renal f/u     #Acute on chronic HFpEF  -remains clinically overloaded, hypervolemic  -but not decompensated, laying supine and tolerating line placement in ed  -volume removal with HD  -bumex per renal     #CAD, s/p PCI, most recent 2/2022  -stable, cont ASA, off plavix due to pradaxa indication  -statin    #Chronic AF  -stable   -cont metoprolol, dilt as ordered  -IV heparin post hd access for a/c  -inr elevated in light of pradaxa use, ant  -pending INR today -- plan to start iv heparin if <2    #R carotid stenosis  -cont med tx  -MRA noted with Greater than 75% stenosis of the right distal common carotid artery. Approximately 60% stenosis of the proximal left internal carotid artery.  -Continue ASA and Statin Therapy  -vasc outpt f/u Dr Pinto    ACP- Advanced Care Planning  -Advanced care planning discussed with patient. Advanced care planning forms discussed with patient and/or family.  Risks, benefits, and alternatives of medical/cardiac procedures were discussed in detail with all questions answered.  30 minutes were spent addressing advance care planning.        plan discussed with ACP and wife at bedside  A/P    68 y/o M with history of CAD, s/p multiple PCI, with most recent 2/22 PCI of LAD in setting of NSTEMI, on ASA only (pradaxa), chronic atrial fibrillation maintained on Pradaxa, essential HTN, type 2 DM previously on oral medications but on insulin, diabetic neuropathy with chronic RIGHT LE venous stasis changes>left, LEFT foot ulcer seen by podiatry, past endarterectomy LEFT CEA in 2014 presenting with 1 week of decreased urine output, cystitis with hematuria     #ANT on CKD  -oliguric renal failure in setting of ? UTI/cystitis, r/o obstruction  -? secondary to abx   -worsened with diuretic use but unlikely due to hypovolemia alone from diuretic use   -r/o bacteremia, r/o other infection ?leg cellulitis  - UA+ , Ucx noted -iv abx per id , f/u BCx  -hyperkalemia mgmt per renal   -New HD for uremia, renal failure  -hopefully HD only temporary  -trend ecg, tele with hyperkalemia  -renal f/u     #Acute on chronic HFpEF  -remains clinically overloaded, hypervolemic  -but not decompensated, laying supine and tolerating line placement in ed  -volume removal with HD  -bumex per renal     #CAD, s/p PCI, most recent 2/2022  -stable, cont ASA, off plavix due to pradaxa indication  -statin    #Chronic AF  -stable   -cont metoprolol, dilt as ordered  -IV heparin post hd access for a/c  -inr elevated in light of pradaxa use, ant  -pending INR today -- plan to start iv heparin if <2    #R carotid stenosis  -cont med tx  -MRA noted with Greater than 75% stenosis of the right distal common carotid artery. Approximately 60% stenosis of the proximal left internal carotid artery.  -Continue ASA and Statin Therapy  -vasc outpt f/u Dr Pinto    ACP- Advanced Care Planning  -Advanced care planning discussed with patient. Advanced care planning forms discussed with patient and/or family.  Risks, benefits, and alternatives of medical/cardiac procedures were discussed in detail with all questions answered.  30 minutes were spent addressing advance care planning.        plan discussed with ACP and wife at bedside  A/P    68 y/o M with history of CAD, s/p multiple PCI, with most recent 2/22 PCI of LAD in setting of NSTEMI, on ASA only (pradaxa), chronic atrial fibrillation maintained on Pradaxa, essential HTN, type 2 DM previously on oral medications but on insulin, diabetic neuropathy with chronic RIGHT LE venous stasis changes>left, LEFT foot ulcer seen by podiatry, past endarterectomy LEFT CEA in 2014 presenting with 1 week of decreased urine output, cystitis with hematuria     #ANT on CKD  -oliguric renal failure in setting of ? UTI/cystitis, r/o obstruction  -? secondary to abx   -worsened with diuretic use but unlikely due to hypovolemia alone from diuretic use   -r/o bacteremia, r/o other infection ?leg cellulitis  - UA+ , Ucx noted -iv abx per id , f/u BCx  -hyperkalemia mgmt per renal   -New HD for uremia, renal failure  -hopefully HD only temporary  -trend ecg, tele with hyperkalemia  -renal f/u     #Acute on chronic HFpEF  -remains clinically overloaded, hypervolemic  -but not decompensated  -volume removal with HD  -bumex per renal     #CAD, s/p PCI, most recent 2/2022  -stable, cont ASA, off plavix due to pradaxa indication  -statin    #Chronic AF  -stable   -cont metoprolol, dilt as ordered  -IV heparin post hd access for a/c  -inr elevated in light of pradaxa use, ant  -pending INR today -- plan to start iv heparin if <2    #R carotid stenosis  -cont med tx  -MRA noted with Greater than 75% stenosis of the right distal common carotid artery. Approximately 60% stenosis of the proximal left internal carotid artery.  -Continue ASA and Statin Therapy  -vasc outpt f/u Dr Pinto    ACP- Advanced Care Planning  -Advanced care planning discussed with patient. Advanced care planning forms discussed with patient and/or family.  Risks, benefits, and alternatives of medical/cardiac procedures were discussed in detail with all questions answered.  30 minutes were spent addressing advance care planning.        plan discussed with ACP and wife at bedside

## 2022-04-04 NOTE — PROGRESS NOTE ADULT - SUBJECTIVE AND OBJECTIVE BOX
Vascular Surgery Progress Note    SUBJECTIVE: Pt seen and examined at bedside. Patient comfortable and in no-apparent distress. Received HD successfully via R groin Shiley. L neck and L groin puncture sites soft, without hematoma.    Vital Signs Last 24 Hrs  T(C): 36.3 (2022 05:12), Max: 37.1 (2022 15:30)  T(F): 97.3 (2022 05:12), Max: 98.7 (2022 15:30)  HR: 88 (2022 05:12) (73 - 98)  BP: 121/65 (2022 05:12) (121/65 - 167/67)  BP(mean): --  RR: 20 (2022 05:12) (18 - 21)  SpO2: 92% (2022 05:12) (91% - 95%)    Physical Exam:  General Appearance: Appears well, NAD  Respiratory: No labored breathing  CV: Pulse regularly present  R groin Shiley site soft, dressing c/d/i  L groin soft, pressure dressing removed  L neck soft    LABS:                        12.5   11.04 )-----------( 210      ( 2022 06:27 )             41.8     04-04    134<L>  |  91<L>  |  119<H>  ----------------------------<  174<H>  5.9<H>   |  17<L>  |  8.51<H>    Ca    8.3<L>      2022 06:27  Phos  12.4     04-04  Mg     3.4     04-04    TPro  7.6  /  Alb  3.3  /  TBili  0.5  /  DBili  x   /  AST  15  /  ALT  5<L>  /  AlkPhos  107  04-03    PT/INR - ( 2022 06:58 )   PT: 28.4 sec;   INR: 2.43 ratio         PTT - ( 2022 18:56 )  PTT:83.1 sec  Urinalysis Basic - ( 2022 00:48 )    Color: Light Orange / Appearance: Turbid / S.021 / pH: x  Gluc: x / Ketone: Negative  / Bili: Negative / Urobili: Negative   Blood: x / Protein: 300 mg/dL / Nitrite: Negative   Leuk Esterase: Large / RBC: 20 /hpf /  /HPF   Sq Epi: x / Non Sq Epi: 3 /hpf / Bacteria: Negative        INs and OUTs:    22 @ 07:01  -  22 @ 07:00  --------------------------------------------------------  IN: 900 mL / OUT: 1300 mL / NET: -400 mL

## 2022-04-04 NOTE — PROGRESS NOTE ADULT - SUBJECTIVE AND OBJECTIVE BOX
Patient is a 67y old  Male who presents with a chief complaint of Decreased urine output for the past week, leg oedema (2022 12:49)      INTERVAL HPI/OVERNIGHT EVENTS:    Medications:MEDICATIONS  (STANDING):  allopurinol 100 milliGRAM(s) Oral daily  aspirin enteric coated 81 milliGRAM(s) Oral daily  atorvastatin 40 milliGRAM(s) Oral at bedtime  cefTRIAXone   IVPB 1000 milliGRAM(s) IV Intermittent every 24 hours  cefTRIAXone   IVPB      chlorhexidine 2% Cloths 1 Application(s) Topical daily  dextrose 5%. 1000 milliLiter(s) (100 mL/Hr) IV Continuous <Continuous>  dextrose 5%. 1000 milliLiter(s) (50 mL/Hr) IV Continuous <Continuous>  dextrose 50% Injectable 25 Gram(s) IV Push once  dextrose 50% Injectable 12.5 Gram(s) IV Push once  dextrose 50% Injectable 25 Gram(s) IV Push once  diltiazem    milliGRAM(s) Oral daily  glucagon  Injectable 1 milliGRAM(s) IntraMuscular once  insulin lispro (ADMELOG) corrective regimen sliding scale   SubCutaneous three times a day before meals  insulin lispro (ADMELOG) corrective regimen sliding scale   SubCutaneous at bedtime  metoprolol succinate  milliGRAM(s) Oral daily  pregabalin 100 milliGRAM(s) Oral three times a day    MEDICATIONS  (PRN):  dextrose Oral Gel 15 Gram(s) Oral once PRN Blood Glucose LESS THAN 70 milliGRAM(s)/deciliter  oxycodone    5 mG/acetaminophen 325 mG 2 Tablet(s) Oral every 6 hours PRN Severe Pain (7 - 10)  oxycodone    5 mG/acetaminophen 325 mG 1 Tablet(s) Oral every 6 hours PRN Moderate Pain (4 - 6)      Allergies: Allergies    nitrofurantoin (Nephrotoxicity)    Intolerances          FAMILY HISTORY:  Family history of heart disease  father    Family history of CVA  mother          PAST MEDICAL & SURGICAL HISTORY:  HTN (hypertension), benign    HLD (hyperlipidemia)    DM type 2 (diabetes mellitus, type 2)    TIA (transient ischemic attack)    Atrial fibrillation    MI (myocardial infarction)  circa  and .    CAD (coronary artery disease)    Neuropathy    Stage 3 chronic kidney disease    2019 novel coronavirus disease (COVID-19)  2021.  Received the COVID-19 vaccine x 2 as of 2022.    Status post angioplasty with stent  LULU x 3 2014    S/P carotid endarterectomy  left        REVIEW OF SYSTEMS:  CONSTITUTIONAL: No fever, weight loss, or fatigue  EYES: No eye pain, visual disturbances, or discharge  ENMT:  No difficulty hearing, tinnitus, vertigo; No sinus or throat pain  NECK: No pain or stiffness  BREASTS: No pain, masses, or nipple discharge  RESPIRATORY: No cough, wheezing, chills or hemoptysis; No shortness of breath  CARDIOVASCULAR: No chest pain, palpitations, dizziness, or leg swelling  GASTROINTESTINAL: No abdominal or epigastric pain. No nausea, vomiting, or hematemesis; No diarrhea or constipation. No melena or hematochezia.  GENITOURINARY: No dysuria, frequency, hematuria, or incontinence  NEUROLOGICAL: No headaches, memory loss, loss of strength, numbness, or tremors  SKIN: No itching, burning, rashes, or lesions   LYMPH NODES: No enlarged glands  ENDOCRINE: No heat or cold intolerance; No hair loss  MUSCULOSKELETAL: No joint pain or swelling; No muscle, back, or extremity pain  PSYCHIATRIC: No depression, anxiety, mood swings, or difficulty sleeping  HEME/LYMPH: No easy bruising, or bleeding gums  ALLERY AND IMMUNOLOGIC: No hives or eczema    T(C): 36.5 (22 @ 12:40), Max: 37.1 (22 @ 15:30)  HR: 90 (22 @ 12:40) (73 - 98)  BP: 128/68 (22 @ 12:40) (105/60 - 166/80)  RR: 18 (22 @ 12:40) (18 - 21)  SpO2: 95% (22 @ 12:40) (91% - 95%)  Wt(kg): --Vital Signs Last 24 Hrs  T(C): 36.5 (2022 12:40), Max: 37.1 (2022 15:30)  T(F): 97.7 (2022 12:40), Max: 98.7 (2022 15:30)  HR: 90 (2022 12:40) (73 - 98)  BP: 128/68 (2022 12:40) (105/60 - 166/80)  BP(mean): 80 (2022 12:40) (80 - 84)  RR: 18 (2022 12:40) (18 - 21)  SpO2: 95% (2022 12:40) (91% - 95%)  I&O's Summary    2022 07:01  -  2022 07:00  --------------------------------------------------------  IN: 900 mL / OUT: 1300 mL / NET: -400 mL    2022 07:01  -  2022 13:22  --------------------------------------------------------  IN: 0 mL / OUT: 500 mL / NET: -500 mL        PHYSICAL EXAM:  aches/pain  HEAD:  Atraumatic, Normocephalic  EYES: EOMI, PERRLA, conjunctiva and sclera clear  ENMT: No tonsillar erythema, exudates, or enlargement  NECK: Supple, No JVD, Normal thyroid  NERVOUS SYSTEM:  Alert & Oriented   Motor Strength 5/5 B/L upper and lower extremities; DTRs 2+ intact and symmetric  CHEST/LUNG: dec bs   HEART: Regular rate and rhythm; No murmurs, rubs, or gallops  ABDOMEN: Soft, Nontender, Nondistended; Bowel sounds present  EXTREMITIES:dec rom  pvd      Consultant(s) Notes Reviewed:  [x ] YES  [ ] NO  Care Discussed with Consultants/Other Providerscpk [ x] YES  [ ] NO    LABS:                    CBC Full  -  ( 2022 06:27 )  WBC Count : 11.04 K/uL  RBC Count : 4.97 M/uL  Hemoglobin : 12.5 g/dL  Hematocrit : 41.8 %  Platelet Count - Automated : 210 K/uL  Mean Cell Volume : 84.1 fl  Mean Cell Hemoglobin : 25.2 pg  Mean Cell Hemoglobin Concentration : 29.9 gm/dL  Auto Neutrophil # : x  Auto Lymphocyte # : x  Auto Monocyte # : x  Auto Eosinophil # : x  Auto Basophil # : x  Auto Neutrophil % : x  Auto Lymphocyte % : x  Auto Monocyte % : x  Auto Eosinophil % : x  Auto Basophil % : x      04-    134<L>  |  91<L>  |  119<H>  ----------------------------<  174<H>  5.9<H>   |  17<L>  |  8.51<H>    Ca    8.3<L>      2022 06:27  Phos  12.4     -  Mg     3.4     -    TPro  7.6  /  Alb  3.3  /  TBili  0.5  /  DBili  x   /  AST  15  /  ALT  5<L>  /  AlkPhos  107  -      Urinalysis Basic - ( 2022 00:48 )    Color: Light Orange / Appearance: Turbid / S.021 / pH: x  Gluc: x / Ketone: Negative  / Bili: Negative / Urobili: Negative   Blood: x / Protein: 300 mg/dL / Nitrite: Negative   Leuk Esterase: Large / RBC: 20 /hpf /  /HPF   Sq Epi: x / Non Sq Epi: 3 /hpf / Bacteria: Negative        PT/INR - ( 2022 06:58 )   PT: 28.4 sec;   INR: 2.43 ratio         PTT - ( 2022 18:56 )  PTT:83.1 sec  RADIOLOGY & ADDITIONAL TESTS:    Imaging Personally Reviewed:  [ ] YES  [ ] NO

## 2022-04-04 NOTE — CHART NOTE - NSCHARTNOTEFT_GEN_A_CORE
Medicine PA Episodic Note    Patient is a 67y old  Male who presents with a chief complaint of Decreased urine output for the past week, leg oedema (04 Apr 2022 13:58)    Notified by RN of pt. being lethargic since this afternoon after HD. Pt. seen and examined w/ wife at bedside, alert, responsive to pain and name, otherwise, in NAD. Unable to obtain ROS d/t current mental status. Per wife pt. has been lethargic since ~4 pm since she arrived. On chart review pt. noted to be lethargic throughout the day today. Pt. able to move upper extremities spontaneously.     Vital Signs Last 24 Hrs  T(C): 36.5 (04 Apr 2022 20:41), Max: 36.7 (04 Apr 2022 00:49)  T(F): 97.7 (04 Apr 2022 20:41), Max: 98.1 (04 Apr 2022 00:49)  HR: 78 (04 Apr 2022 20:41) (74 - 98)  BP: 107/65 (04 Apr 2022 20:41) (105/60 - 144/62)  BP(mean): 80 (04 Apr 2022 12:40) (80 - 84)  RR: 18 (04 Apr 2022 20:41) (18 - 20)  SpO2: 94% (04 Apr 2022 20:41) (92% - 96%)    [id number]    Labs:                          12.5   11.04 )-----------( 210      ( 04 Apr 2022 06:27 )             41.8     04-04    134<L>  |  91<L>  |  119<H>  ----------------------------<  174<H>  5.9<H>   |  17<L>  |  8.51<H>    Ca    8.3<L>      04 Apr 2022 06:27  Phos  12.4     04-04  Mg     3.4     04-04    TPro  7.6  /  Alb  3.3  /  TBili  0.5  /  DBili  x   /  AST  15  /  ALT  5<L>  /  AlkPhos  107  04-03        Radiology:  < from: CT Chest No Cont (04.04.22 @ 16:52) >      ACC: [id number] EXAM:  CT CHEST                          PROCEDURE DATE:  04/04/2022          INTERPRETATION:  HISTORY: Admitting Dxs: N18.9 DEC URINATION. Shortness   of breath.    EXAMINATION: CT CHEST was performed without IV contrast.    COMPARISON: 2/19/2022.    FINDINGS:    AIRWAYS, LUNGS, PLEURA: Tracheal secretions. Small left greater than   right pleural effusions increased compared to 2/19/2022. Right-sided   pleural thickening. Lingular and left greater than right basilar   opacification, likely atelectasis.    MEDIASTINUM: Cardiomegaly. No pericardial effusion. Thoracic aorta normal   caliber.  No large mediastinal lymph nodes.    IMAGED ABDOMEN: Nonspecific perinephric stranding.    SOFT TISSUES: Unremarkable.    BONES: Unremarkable.      IMPRESSION:.    Small left greater than right bilateral pleural effusions increased in   size compared to 2/19/2022.    Lingular and left greater than right basilar opacification, likely   atelectasis.    --- End of Report ---             LARRY CALDWELL MD; Attending Radiologist  This document has been electronically signed. Apr 4 2022  5:03PM      Physical Exam:  General: WN/WD NAD  Neurology: Refer to HPI  Head:  Normocephalic, atraumatic  Respiratory: CTA B/L  CV: RRR, S1S2, no murmur  Abdominal: Soft, NT, ND no palpable mass  MSK: No edema, + peripheral pulses,     Assessment & Plan:  67 year old gentleman with PMH of CAD, NSTEMI s/p 3 stents in 2014 (stents to LAD, proximal and distal LCx), ischemic cardiomyopathy, HTN for 10 years, T2DM for 26 years c/b peripheral neuropathy, CVA, TIA, Afib on Pradaxa, chronic RLE wound, L carotid stenosis s/p endarterectomy (2014) just recently admitted  to the [facility] on 2/19/2022 with acute onset chest pain for one day found to have an NSTEMI, CAD, s/p PCI in setting of increased AF rates off bb for 3 days and resolved chest pain, his CKMB peaked and Echo noted with EF 60%,normal LV function, mild TR, mild MA. He was s/p LHC with 85% proximal LAD s/p balloon angioplasty and LULU. He presented to [facility] ED with decreased urine output over the week. [facility] and was started  on Nitrofurantoin. Pt is still taking Nitrofurantoin w/ 5 days left. He came to the ED due to worsening symptoms. Pt reports having a similar incident 4-5 years ago that resolved spontaneously. He reports increased leg edema Dyspnea worse on exertion. his baseline Serum creatinine is in the 2.0 to 2.3 mg/dl range. ANT with serum creatinine of 8.29 with bun 144 and k 5.5. Now w/ lethargy throughout the day likely 2/2 uremic encephalopathy    Plan:  #Lethargy likely 2/2 Uremic encephalopath  - Case d/w Dr. Corral - obtain MICU c/s  - Case d/w ICU Dr. Bartholomew, will obtain ABG  - Monitor VS closely  - F/U w/ nephrology in AM  - Will endorse to day team in AM    Follow up with Attending in AM.    Judy Russell PA-C  Department of Medicine  [facility] [address] Medicine PA Episodic Note    Patient is a 67y old  Male who presents with a chief complaint of Decreased urine output for the past week, leg oedema (04 Apr 2022 13:58)    Notified by RN of pt. being lethargic since this afternoon after HD. Pt. seen and examined w/ wife at bedside, alert, responsive to pain and name, otherwise, in NAD. Unable to obtain ROS d/t current mental status. Per wife pt. has been lethargic since ~4 pm since she arrived. On chart review pt. noted to be lethargic throughout the day today. Pt. able to move upper extremities spontaneously.     Vital Signs Last 24 Hrs  T(C): 36.5 (04 Apr 2022 20:41), Max: 36.7 (04 Apr 2022 00:49)  T(F): 97.7 (04 Apr 2022 20:41), Max: 98.1 (04 Apr 2022 00:49)  HR: 78 (04 Apr 2022 20:41) (74 - 98)  BP: 107/65 (04 Apr 2022 20:41) (105/60 - 144/62)  BP(mean): 80 (04 Apr 2022 12:40) (80 - 84)  RR: 18 (04 Apr 2022 20:41) (18 - 20)  SpO2: 94% (04 Apr 2022 20:41) (92% - 96%)      Labs:                          12.5   11.04 )-----------( 210      ( 04 Apr 2022 06:27 )             41.8     04-04    134<L>  |  91<L>  |  119<H>  ----------------------------<  174<H>  5.9<H>   |  17<L>  |  8.51<H>    Ca    8.3<L>      04 Apr 2022 06:27  Phos  12.4     04-04  Mg     3.4     04-04    TPro  7.6  /  Alb  3.3  /  TBili  0.5  /  DBili  x   /  AST  15  /  ALT  5<L>  /  AlkPhos  107  04-03        Radiology:  < from: CT Chest No Cont (04.04.22 @ 16:52) >      ACC: [id number] EXAM:  CT CHEST                          PROCEDURE DATE:  04/04/2022          INTERPRETATION:  HISTORY: Admitting Dxs: N18.9 DEC URINATION. Shortness   of breath.    EXAMINATION: CT CHEST was performed without IV contrast.    COMPARISON: 2/19/2022.    FINDINGS:    AIRWAYS, LUNGS, PLEURA: Tracheal secretions. Small left greater than   right pleural effusions increased compared to 2/19/2022. Right-sided   pleural thickening. Lingular and left greater than right basilar   opacification, likely atelectasis.    MEDIASTINUM: Cardiomegaly. No pericardial effusion. Thoracic aorta normal   caliber.  No large mediastinal lymph nodes.    IMAGED ABDOMEN: Nonspecific perinephric stranding.    SOFT TISSUES: Unremarkable.    BONES: Unremarkable.      IMPRESSION:.    Small left greater than right bilateral pleural effusions increased in   size compared to 2/19/2022.    Lingular and left greater than right basilar opacification, likely   atelectasis.    --- End of Report ---             LARRY CALDWELL MD; Attending Radiologist  This document has been electronically signed. Apr 4 2022  5:03PM      Physical Exam:  General: WN/WD NAD  Neurology: Refer to HPI  Head:  Normocephalic, atraumatic  Respiratory: CTA B/L  CV: RRR, S1S2, no murmur  Abdominal: Soft, NT, ND no palpable mass  MSK: No edema, + peripheral pulses,     Assessment & Plan:  67 year old gentleman with PMH of CAD, NSTEMI s/p 3 stents in 2014 (stents to LAD, proximal and distal LCx), ischemic cardiomyopathy, HTN for 10 years, T2DM for 26 years c/b peripheral neuropathy, CVA, TIA, Afib on Pradaxa, chronic RLE wound, L carotid stenosis s/p endarterectomy (2014) just recently admitted  to the [facility] on 2/19/2022 with acute onset chest pain for one day found to have an NSTEMI, CAD, s/p PCI in setting of increased AF rates off bb for 3 days and resolved chest pain, his CKMB peaked and Echo noted with EF 60%,normal LV function, mild TR, mild GA. He was s/p LHC with 85% proximal LAD s/p balloon angioplasty and LULU. He presented to [facility] ED with decreased urine output over the week. [facility] and was started  on Nitrofurantoin. Pt is still taking Nitrofurantoin w/ 5 days left. He came to the ED due to worsening symptoms. Pt reports having a similar incident 4-5 years ago that resolved spontaneously. He reports increased leg edema Dyspnea worse on exertion. his baseline Serum creatinine is in the 2.0 to 2.3 mg/dl range. ANT with serum creatinine of 8.29 with bun 144 and k 5.5. Now w/ lethargy throughout the day likely 2/2 uremic encephalopathy    Plan:  #Lethargy likely 2/2 Uremic encephalopath  - Case d/w Dr. Corral - obtain MICU c/s  - Case d/w ICU Dr. Bartholomew, will obtain ABG  - Monitor VS closely  - F/U w/ nephrology in AM  - Will endorse to day team in AM    Follow up with Attending in AM.    Judy Russell PA-C  Department of Medicine  [facility] [id number]

## 2022-04-04 NOTE — PROGRESS NOTE ADULT - ASSESSMENT
Patient is a 67 year old male with PMH of CAD with past multiple PCI, with most recent discharge from [facility] in Feb 2022 for NSTEMI with LULU of an 85% prox LAD lesion with patient on ASA and now off Plavix per cardiology (only for one month), chronic afib on Pradaxa, HTN, type 2 DM on insulin, diabetic neuropathy with chronic RIGHT LE venous stasis changes>left, LEFT foot ulcer seen by podiatry, past endarterectomy LEFT CEA in 2014 who presented with UTI with hematuria diagnosed at urgent care and now with decreased urine output.     Acute cystitis/UTI    - noted dark urine with hematuria, went to UC and was prescribed macrobid, had some improvement in urine color but then noticed decreased urine output with no voiding reported in last 2 days   - UA here with pyuria -  with large LE, negative nitrites and no bacteria    - CTAP reviewed - no hydronephrosis, nephrolithiasis, or evidence of obstructive uropathy   - follow urine and blood cultures    - continue on ceftriaxone, will use cx to guide therapy   - monitor temps/WBC    ANT on CKD3   - Nephrology following, appreciate recs   - s/p placement of shiley and initiation of HD   - monitor Cr    - renally dose meds     B/l LE edema with chronic venous stasis  Acute on chronic HFpEF   - legs cool to touch, nontender, chronic changes with no sign of infection/cellulitis   - has wound on bottom of L foot - dry and does not appear infected either   - clinically overloaded, Cardiology and Nephrology following, on IV bumex, for HD   - elevate lower extremities      DM2 with neuropathy   - Blood glucose management per primary team.    Infectious Diseases will continue to follow. Please call with any questions.   Annie King M.D.  [facility], Division of Infectious Diseases [phone]

## 2022-04-04 NOTE — PROGRESS NOTE ADULT - SUBJECTIVE AND OBJECTIVE BOX
CARDIOLOGY FOLLOW UP - Dr. Mazariegos  Date of Service: 4/4/22  CC: resting comfortably  wife at bedside  no complaints     Review of Systems:  Constitutional: No fever, weight loss, or fatigue  Respiratory: No cough, wheezing, or hemoptysis, no shortness of breath  Cardiovascular: No chest pain, palpitations, passing out, dizziness, or leg swelling  Gastrointestinal: No abd or epigastric pain.  No nausea, vomiting, or hematemesis; no diarrhea or constipation, no melena or hematochezia  Vascular: no edema       PHYSICAL EXAM:  T(C): 36.4 (04-04-22 @ 09:38), Max: 37.1 (04-03-22 @ 15:30)  HR: 84 (04-04-22 @ 09:38) (73 - 98)  BP: 144/62 (04-04-22 @ 09:38) (105/60 - 166/80)  RR: 18 (04-04-22 @ 09:38) (18 - 21)  SpO2: 95% (04-04-22 @ 09:38) (91% - 95%)  Wt(kg): --  I&O's Summary    03 Apr 2022 07:01  -  04 Apr 2022 07:00  --------------------------------------------------------  IN: 900 mL / OUT: 1300 mL / NET: -400 mL        Appearance: Normal	  Cardiovascular: irreg  No JVD, No murmurs  Respiratory: Lungs clear to auscultation	  Gastrointestinal:  Soft, Non-tender, + BS	  Extremities: Normal range of motion, No clubbing, cyanosis + bl le edema      Home Medications:  allopurinol 100 mg oral tablet: 1 tab(s) orally once a day (03 Apr 2022 06:34)  aspirin 81 mg oral delayed release tablet: 1 tab(s) orally once a day (03 Apr 2022 06:34)  bumetanide 2 mg oral tablet: 1 tab(s) orally once a day (03 Apr 2022 06:34)  insulin glargine 100 units/mL subcutaneous solution: 25 unit(s) subcutaneous once a day (at bedtime) (03 Apr 2022 06:34)  metOLazone 5 mg oral tablet: 1 tab(s) orally 3 times a week (03 Apr 2022 06:34)  metoprolol succinate 50 mg oral tablet, extended release: 2 tab(s) orally once a day (03 Apr 2022 06:34)  NovoLOG 100 units/mL subcutaneous solution: subcutaneous 4 times a day (before meals and at bedtime) (03 Apr 2022 06:34)  NovoLOG FlexPen 100 units/mL injectable solution: 10 unit(s) subcutaneous 3 times a day (03 Apr 2022 06:34)  oxycodone-acetaminophen 5 mg-325 mg oral tablet: 1 tab(s) orally 2 times a day (03 Apr 2022 06:34)  Pradaxa 150 mg oral capsule: 1 cap(s) orally 2 times a day (03 Apr 2022 06:34)  pregabalin 100 mg oral capsule: 1 cap(s) orally 3 times a day (03 Apr 2022 06:34)      MEDICATIONS  (STANDING):  allopurinol 100 milliGRAM(s) Oral daily  aspirin enteric coated 81 milliGRAM(s) Oral daily  atorvastatin 40 milliGRAM(s) Oral at bedtime  cefTRIAXone   IVPB 1000 milliGRAM(s) IV Intermittent every 24 hours  cefTRIAXone   IVPB      chlorhexidine 2% Cloths 1 Application(s) Topical daily  dextrose 5%. 1000 milliLiter(s) (100 mL/Hr) IV Continuous <Continuous>  dextrose 5%. 1000 milliLiter(s) (50 mL/Hr) IV Continuous <Continuous>  dextrose 50% Injectable 25 Gram(s) IV Push once  dextrose 50% Injectable 12.5 Gram(s) IV Push once  dextrose 50% Injectable 25 Gram(s) IV Push once  diltiazem    milliGRAM(s) Oral daily  glucagon  Injectable 1 milliGRAM(s) IntraMuscular once  insulin lispro (ADMELOG) corrective regimen sliding scale   SubCutaneous three times a day before meals  insulin lispro (ADMELOG) corrective regimen sliding scale   SubCutaneous at bedtime  metoprolol succinate  milliGRAM(s) Oral daily  pregabalin 100 milliGRAM(s) Oral three times a day      TELEMETRY: afib 90-100s 	    ECG:  	  RADIOLOGY:   DIAGNOSTIC TESTING:  [ ] Echocardiogram:  [ ]  Catheterization:  [ ] Stress Test:    OTHER: 	    LABS:	 	                            12.5   11.04 )-----------( 210      ( 04 Apr 2022 06:27 )             41.8     04-04    134<L>  |  91<L>  |  119<H>  ----------------------------<  174<H>  5.9<H>   |  17<L>  |  8.51<H>    Ca    8.3<L>      04 Apr 2022 06:27  Phos  12.4     04-04  Mg     3.4     04-04    TPro  7.6  /  Alb  3.3  /  TBili  0.5  /  DBili  x   /  AST  15  /  ALT  5<L>  /  AlkPhos  107  04-03    PT/INR - ( 03 Apr 2022 06:58 )   PT: 28.4 sec;   INR: 2.43 ratio         PTT - ( 03 Apr 2022 18:56 )  PTT:83.1 sec

## 2022-04-04 NOTE — PROGRESS NOTE ADULT - ASSESSMENT
67M with multiple medical issues, ANT on CKD with acute renal failure requiring HD. S/P R femoral Shiley placement 4/3.    Plan   - continue HD via R femoral Shiley  - please call back if patient will require permanent HD access    Vascular Surgery  [phone]

## 2022-04-04 NOTE — PATIENT PROFILE ADULT - FALL HARM RISK - HARM RISK INTERVENTIONS

## 2022-04-04 NOTE — PROGRESS NOTE ADULT - ASSESSMENT
67 year old gentleman with PMH of CAD, NSTEMI s/p 3 stents in 2014 (stents to LAD, proximal and distal LCx), ischemic cardiomyopathy, HTN for 10 years, T2DM for 26 years c/b peripheral neuropathy, CVA, TIA, Afib on Pradaxa, chronic RLE wound, L carotid stenosis s/p endarterectomy (2014) just recently admitted  to the [facility] on 2/19/2022 with acute onset chest pain for one day found to have an NSTEMI, CAD, s/p PCI in setting of increased AF rates off bb for 3 days and resolved chest pain, his CKMB peaked and Echo noted with EF 60%,normal LV function, mild TR, mild MO. He was s/p [facility] with 85% proximal LAD s/p balloon angioplasty and LULU. He presented to [facility] ED with decreased urine output over the week. [facility] and was started  on Nitrofurantoin. Pt is still taking Nitrofurantoin w/ 5 days left. He came to the ED due to worsening symptoms. Pt reports having a similar incident 4-5 years ago that resolved spontaneously. He reports increased leg edema Dyspnea worse on exertion. his baseline Serum creatinine is in the 2.0 to 2.3 mg/dl range. ANT with serum creatinine of 8.29 with bun 144 and k 5.5        1- ANT on ckd III   2- chf chronic   3- hyperkalemia   4- uremia  5- hyperphosphatemia       Initiation of Urgent HD, given he is uremic  etiology of ANT ? infection and +/- volume depletion  HD today  F160, 180 min, , , 0.5L fluid removal  see HD flowsheet.   elevated Pro-BNP  no hydro on ct abd  +U/A  blood cx pending  continue ceftriaxone 1 G daily  add renvela one tab with meals  strict I/O  trend labs daily

## 2022-04-04 NOTE — PROGRESS NOTE ADULT - SUBJECTIVE AND OBJECTIVE BOX
[facility], Division of Infectious Diseases  ADRIANA Seals Y. Patel, S. Shah, G. [facility]  [phone]    Name: DYLAN DEL CASTILLO  Age: 67y  Gender: Male  MRN: [id number]    Interval History:  Patient seen and examined at bedside this morning  Wife at bedside  Afebrile. Pt on NC  denies f/c. feeling fatigued  Notes reviewed    Antibiotics:  cefTRIAXone   IVPB 1000 milliGRAM(s) IV Intermittent every 24 hours  cefTRIAXone   IVPB          Medications:  allopurinol 100 milliGRAM(s) Oral daily  aspirin enteric coated 81 milliGRAM(s) Oral daily  atorvastatin 40 milliGRAM(s) Oral at bedtime  cefTRIAXone   IVPB 1000 milliGRAM(s) IV Intermittent every 24 hours  cefTRIAXone   IVPB      chlorhexidine 2% Cloths 1 Application(s) Topical daily  dextrose 5%. 1000 milliLiter(s) IV Continuous <Continuous>  dextrose 5%. 1000 milliLiter(s) IV Continuous <Continuous>  dextrose 50% Injectable 25 Gram(s) IV Push once  dextrose 50% Injectable 12.5 Gram(s) IV Push once  dextrose 50% Injectable 25 Gram(s) IV Push once  dextrose Oral Gel 15 Gram(s) Oral once PRN  diltiazem    milliGRAM(s) Oral daily  glucagon  Injectable 1 milliGRAM(s) IntraMuscular once  insulin lispro (ADMELOG) corrective regimen sliding scale   SubCutaneous three times a day before meals  insulin lispro (ADMELOG) corrective regimen sliding scale   SubCutaneous at bedtime  metoprolol succinate  milliGRAM(s) Oral daily  oxycodone    5 mG/acetaminophen 325 mG 1 Tablet(s) Oral every 12 hours PRN  pregabalin 100 milliGRAM(s) Oral three times a day      Review of Systems:  Review of systems otherwise negative except as previously noted.    Allergies: nitrofurantoin (Nephrotoxicity)    For details regarding the patient's past medical history, social history, family history, and other miscellaneous elements, please refer the initial infectious diseases consultation and/or the admitting history and physical examination for this admission.    Objective:  Vitals:   T(C): 36.4 (22 @ 09:38), Max: 37.1 (22 @ 15:30)  HR: 84 (22 @ 09:38) (73 - 98)  BP: 144/62 (22 @ 09:38) (105/60 - 166/80)  RR: 18 (22 @ 09:38) (18 - 21)  SpO2: 95% (22 @ 09:38) (91% - 95%)    Physical Examination:  General: no acute distress  HEENT: NC/AT, EOMI  Cardio: S1, S2 heard, RRR, no murmurs  Resp: breath sounds heard bilaterally, no rales, wheezes or rhonchi  Abd: soft, NT, ND  Ext: no edema or cyanosis  Skin: warm, dry, no visible rash      Laboratory Studies:  CBC:                       12.5   11.04 )-----------( 210      ( 2022 06:27 )             41.8     CMP:     134<L>  |  91<L>  |  119<H>  ----------------------------<  174<H>  5.9<H>   |  17<L>  |  8.51<H>    Ca    8.3<L>      2022 06:27  Phos  12.4     04-04  Mg     3.4     04-04    TPro  7.6  /  Alb  3.3  /  TBili  0.5  /  DBili  x   /  AST  15  /  ALT  5<L>  /  AlkPhos  107  04-03    LIVER FUNCTIONS - ( 2022 06:58 )  Alb: 3.3 g/dL / Pro: 7.6 g/dL / ALK PHOS: 107 U/L / ALT: 5 U/L / AST: 15 U/L / GGT: x           Urinalysis Basic - ( 2022 00:48 )    Color: Light Orange / Appearance: Turbid / S.021 / pH: x  Gluc: x / Ketone: Negative  / Bili: Negative / Urobili: Negative   Blood: x / Protein: 300 mg/dL / Nitrite: Negative   Leuk Esterase: Large / RBC: 20 /hpf /  /HPF   Sq Epi: x / Non Sq Epi: 3 /hpf / Bacteria: Negative        Microbiology: reviewed      Radiology: reviewed    < from: Xray Chest 1 View AP/PA (22 @ 21:01) >    ACC: [id number] EXAM:  XR CHEST AP OR PA 1V                          PROCEDURE DATE:  2022          INTERPRETATION:  CLINICAL INFORMATION: Shortness of breath.    TECHNIQUE: AP radiograph of the chest.    COMPARISON: Chest radiograph dated 2022. Chest CT dated 2022.    FINDINGS:    Bibasilar patchy airspace disease. Redemonstrated elevation of the left   hemidiaphragm. No pleural effusion or pneumothorax.  Heart size is poorly assessed on this view.  Degenerative changes of the spine.    IMPRESSION:    Bibasilar patchy airspace disease likely atelectasis.    --- End of Report ---          LATONIA HOROWITZ MD; Resident Radiologist  This document has been electronically signed.  LESLIE BASHIR MD; Attending Radiologist  This document has been electronically signed. Apr  3 2022  8:33AM    < end of copied text >

## 2022-04-04 NOTE — PROGRESS NOTE ADULT - SUBJECTIVE AND OBJECTIVE BOX
[facility]   [phone]  RENAL FOLLOW UP NOTE  --------------------------------------------------------------------------------  Chief Complaint:    24 hour events/subjective:    Patient seen and examined with Dr. Art gordon, neri duncan    PAST HISTORY  --------------------------------------------------------------------------------  No significant changes to PMH, PSH, FHx, SHx, unless otherwise noted    ALLERGIES & MEDICATIONS  --------------------------------------------------------------------------------  Allergies    nitrofurantoin (Nephrotoxicity)    Intolerances      Standing Inpatient Medications  allopurinol 100 milliGRAM(s) Oral daily  aspirin enteric coated 81 milliGRAM(s) Oral daily  atorvastatin 40 milliGRAM(s) Oral at bedtime  cefTRIAXone   IVPB 1000 milliGRAM(s) IV Intermittent every 24 hours  cefTRIAXone   IVPB      chlorhexidine 2% Cloths 1 Application(s) Topical daily  dextrose 5%. 1000 milliLiter(s) IV Continuous <Continuous>  dextrose 5%. 1000 milliLiter(s) IV Continuous <Continuous>  dextrose 50% Injectable 25 Gram(s) IV Push once  dextrose 50% Injectable 12.5 Gram(s) IV Push once  dextrose 50% Injectable 25 Gram(s) IV Push once  diltiazem    milliGRAM(s) Oral daily  glucagon  Injectable 1 milliGRAM(s) IntraMuscular once  insulin lispro (ADMELOG) corrective regimen sliding scale   SubCutaneous three times a day before meals  insulin lispro (ADMELOG) corrective regimen sliding scale   SubCutaneous at bedtime  metoprolol succinate  milliGRAM(s) Oral daily  pregabalin 100 milliGRAM(s) Oral three times a day    PRN Inpatient Medications  dextrose Oral Gel 15 Gram(s) Oral once PRN  oxycodone    5 mG/acetaminophen 325 mG 2 Tablet(s) Oral every 6 hours PRN  oxycodone    5 mG/acetaminophen 325 mG 1 Tablet(s) Oral every 6 hours PRN      REVIEW OF SYSTEMS  --------------------------------------------------------------------------------    Gen: denies fevers/chills,  CVS: denies chest pain/palpitations  Resp: denies SOB/Cough  GI: Denies N/V/Abd pain  : Denies dysuria    VITALS/PHYSICAL EXAM  --------------------------------------------------------------------------------  T(C): 36.5 (04-04-22 @ 12:40), Max: 37.1 (04-03-22 @ 15:30)  HR: 90 (04-04-22 @ 12:40) (73 - 98)  BP: 128/68 (04-04-22 @ 12:40) (105/60 - 166/80)  RR: 18 (04-04-22 @ 12:40) (18 - 21)  SpO2: 95% (04-04-22 @ 12:40) (91% - 95%)  Wt(kg): --  Height (cm): 180.3 (04-02-22 @ 14:56)  Weight (kg): 138.8 (04-02-22 @ 14:56)  BMI (kg/m2): 42.7 (04-02-22 @ 14:56)  BSA (m2): 2.53 (04-02-22 @ 14:56)      04-03-22 @ 07:01  -  04-04-22 @ 07:00  --------------------------------------------------------  IN: 900 mL / OUT: 1300 mL / NET: -400 mL    04-04-22 @ 07:01  -  04-04-22 @ 13:58  --------------------------------------------------------  IN: 0 mL / OUT: 500 mL / NET: -500 mL      Physical Exam:  	  	Gen: breathing appears labored. on O2,    	Pulm: Decreased breath sounds b/l bases. no rales or ronchi or wheezing  	CV: No JVD. RRR, S1/S2.   	Abd: +BS, soft, nontender, softly distended, obese   	: No suprapubic tenderness.               Extremity: + hyperpigmented bilateral LE with B/L LE erythema and increased warmth, +asterixis, non-pitting edema  	Neuro: lethargic, answering questions appropriately, slow to respond    LABS/STUDIES  --------------------------------------------------------------------------------              12.5   11.04 >-----------<  210      [04-04-22 @ 06:27]              41.8     134  |  91  |  119  ----------------------------<  174      [04-04-22 @ 06:27]  5.9   |  17  |  8.51        Ca     8.3     [04-04-22 @ 06:27]      Mg     3.4     [04-04-22 @ 06:27]      Phos  12.4     [04-04-22 @ 06:27]    TPro  7.6  /  Alb  3.3  /  TBili  0.5  /  DBili  x   /  AST  15  /  ALT  5   /  AlkPhos  107  [04-03-22 @ 06:58]    PT/INR: PT 28.4 , INR 2.43       [04-03-22 @ 06:58]  PTT: 83.1       [04-03-22 @ 18:56]      Creatinine Trend:  SCr 8.51 [04-04 @ 06:27]  SCr 8.29 [04-03 @ 06:58]  SCr 7.94 [04-03 @ 05:30]  SCr 7.44 [04-02 @ 18:27]              Urinalysis - [04-03-22 @ 00:48]      Color Light Orange / Appearance Turbid / SG 1.021 / pH 5.5      Gluc Negative / Ketone Negative  / Bili Negative / Urobili Negative       Blood Large / Protein 300 mg/dL / Leuk Est Large / Nitrite Negative      RBC 20 /  / Hyaline 10 / Gran  / Sq Epi  / Non Sq Epi 3 / Bacteria Negative    Urine Creatinine 153      [04-03-22 @ 00:48]  Urine Protein 71      [04-03-22 @ 00:48]  Urine Sodium 11      [04-03-22 @ 00:48]  Urine Chloride <20      [04-03-22 @ 00:48]    Iron 31, TIBC 243, %sat 13      [04-04-22 @ 00:17]  Ferritin 152      [04-03-22 @ 23:06]  PTH -- (Ca --)      [04-03-22 @ 23:06]   97  HbA1c 11.7      [11-07-19 @ 09:14]

## 2022-04-05 NOTE — PROGRESS NOTE ADULT - ASSESSMENT
·  Problem: Acute kidney injury superimposed on CKD. pt currently on hd  to cont as per renal         Problem/Plan - 2:  ·  Problem: Chronic atrial fibrillation. c/w a/c  cards f/u     Problem/Plan - 3:  ·  Problem: Cystitis.   abs as per id      Problem/Plan - 4:  ·  Problem: Type 2 diabetes mellitus. c/w insulin   endo f/ui     Problem/Plan - 5:  ·  Problem: CAD (coronary artery disease).   likely volume overload contributing to resp status  ·  Plan: See above.   Presently clinically stable.

## 2022-04-05 NOTE — PROGRESS NOTE ADULT - PROBLEM SELECTOR PLAN 5
MICU consult overnight for lethargy  -Lethargic this AM but arousable  -Sedating medications held (Lyrica, Percocet)  -ABG with no CO2 retention.

## 2022-04-05 NOTE — PROGRESS NOTE ADULT - PROBLEM SELECTOR PLAN 3
Presents with dark urine with hematuria, decreased UO  -CT A/P with no hydronephrosis, nephrolithiasis, or obstruction  -UC negative  -ABX per ID.

## 2022-04-05 NOTE — PHYSICAL THERAPY INITIAL EVALUATION ADULT - PRECAUTIONS/LIMITATIONS, REHAB EVAL
fall precautions Also with increase in B/L LE edema, weight gain, and orthopnea. On admission, labs notable for creatinine of 7.44 (noted to be 2.2 in 2/2022) and proBNP of 14,000, urgent HD initiated due to uremia. Pt a/w Acute kidney injury superimposed on CKD./fall precautions

## 2022-04-05 NOTE — PROGRESS NOTE ADULT - PROBLEM SELECTOR PLAN 1
Suspect 2nd to fluid overload given elevated proBNP, LE edema  -Endorses improvement of symptoms since admission s/p HD   -Keep O>I with HD as tolerated   -Pt with hx of hydroPTX. CT chest in 2/2022 with pleural thickening, atelectasis. Findings at the time suspected to be chronic. CT A/P 4/2 with small b/l pl effusions L>R, pleural thickening  -CT chest now with small b/l pl effusion R>L, bibasilar atelectasis  -Keep sats >90% with supplemental O2 as needed (currently 2LNC).

## 2022-04-05 NOTE — PHYSICAL THERAPY INITIAL EVALUATION ADULT - TRANSFER TRAINING, PT EVAL
Pt will transfer independenly with RW or least restrictive AD in 2 weeks. Pt will transfer independently with RW or least restrictive AD in 4 weeks.

## 2022-04-05 NOTE — PROGRESS NOTE ADULT - ASSESSMENT
A/P    66 y/o M with history of CAD, s/p multiple PCI, with most recent 2/22 PCI of LAD in setting of NSTEMI, on ASA only (pradaxa), chronic atrial fibrillation maintained on Pradaxa, essential HTN, type 2 DM previously on oral medications but on insulin, diabetic neuropathy with chronic RIGHT LE venous stasis changes>left, LEFT foot ulcer seen by podiatry, past endarterectomy LEFT CEA in 2014 presenting with 1 week of decreased urine output, cystitis with hematuria     #ANT on CKD  -oliguric renal failure in setting of ? UTI/cystitis, r/o obstruction  -? secondary to abx   -worsened with diuretic use but unlikely due to hypovolemia alone from diuretic use   -r/o bacteremia, r/o other infection ?leg cellulitis  - UA+ , Ucx noted -iv abx per id , f/u BCx  -hyperkalemia mgmt per renal   -New HD for uremia, renal failure  -hopefully HD only temporary  -renal f/u     #AMS/Lethargy  -likely 2/2 Uremic encephalopathy   -ABG noted  -med f/u   -MICU eval noted     #Acute on chronic HFpEF  -remains clinically overloaded, hypervolemic  -but not decompensated  -CT chest noted   -volume removal with HD  -bumex per renal   -not tolerating PO - transition bb to IVP bb     #CAD, s/p PCI, most recent 2/2022  -stable, cont ASA, off plavix due to pradaxa indication  -statin as able     #Chronic AF  -stable   -not tolerating PO - c/w iv bb - inc as needed  -can give IVP Cardizem PRN if needed   -c/w hep gtt     #R carotid stenosis  -cont med tx  -MRA noted with Greater than 75% stenosis of the right distal common carotid artery. Approximately 60% stenosis of the proximal left internal carotid artery.  -Continue ASA and Statin Therapy  -vasc outpt f/u Dr Pinto    ACP- Advanced Care Planning  -Advanced care planning discussed with patient. Advanced care planning forms discussed with patient and/or family.  Risks, benefits, and alternatives of medical/cardiac procedures were discussed in detail with all questions answered.  30 minutes were spent addressing advance care planning.        plan discussed with ACP and family at bedside

## 2022-04-05 NOTE — PHYSICAL THERAPY INITIAL EVALUATION ADULT - PERTINENT HX OF CURRENT PROBLEM, REHAB EVAL
67y M pmhx of CAD, multiple past PCI with most recent PCI in Feb 2022 following non ST elevation MI, type 2 DM on insulin with complicating peripheral neuropathy, past CVA and LEFT CEA with no residual deficits, chronic RIGHT>left induration with poorly healing ulcers, chronic atrial fibrillation on Pradaxa.  Pt presents following treatment at urgent care center for an apparent cystitis with hematuria on nitrofurantoin, with pt noting decreased urine output x past week. Pt a/w

## 2022-04-05 NOTE — PROGRESS NOTE ADULT - SUBJECTIVE AND OBJECTIVE BOX
[facility], Division of Infectious Diseases  ADRIANA Seals Y. Patel, S. Shah, G. [facility]  [phone]    Name: DYLAN DEL CASTILLO  Age: 67y  Gender: Male  MRN: [id number]    Interval History:  Patient seen and examined at bedside this morning  Daughter at bedside  Continues to have declining mental status  Notes reviewed    Antibiotics:  cefTRIAXone   IVPB 1000 milliGRAM(s) IV Intermittent every 24 hours  cefTRIAXone   IVPB          Medications:  allopurinol 100 milliGRAM(s) Oral daily  aspirin enteric coated 81 milliGRAM(s) Oral daily  atorvastatin 40 milliGRAM(s) Oral at bedtime  cefTRIAXone   IVPB 1000 milliGRAM(s) IV Intermittent every 24 hours  cefTRIAXone   IVPB      chlorhexidine 2% Cloths 1 Application(s) Topical daily  dextrose 5%. 1000 milliLiter(s) IV Continuous <Continuous>  dextrose 5%. 1000 milliLiter(s) IV Continuous <Continuous>  dextrose 50% Injectable 25 Gram(s) IV Push once  dextrose 50% Injectable 12.5 Gram(s) IV Push once  dextrose 50% Injectable 25 Gram(s) IV Push once  dextrose Oral Gel 15 Gram(s) Oral once PRN  diltiazem    milliGRAM(s) Oral daily  glucagon  Injectable 1 milliGRAM(s) IntraMuscular once  heparin   Injectable 84544 Unit(s) IV Push every 6 hours PRN  heparin   Injectable 5000 Unit(s) IV Push every 6 hours PRN  heparin  Infusion.  Unit(s)/Hr IV Continuous <Continuous>  insulin lispro (ADMELOG) corrective regimen sliding scale   SubCutaneous three times a day before meals  insulin lispro (ADMELOG) corrective regimen sliding scale   SubCutaneous at bedtime  metoprolol succinate  milliGRAM(s) Oral daily  sevelamer carbonate 800 milliGRAM(s) Oral three times a day with meals      Review of Systems:  Review of systems otherwise negative except as previously noted.    Allergies: nitrofurantoin (Nephrotoxicity)    For details regarding the patient's past medical history, social history, family history, and other miscellaneous elements, please refer the initial infectious diseases consultation and/or the admitting history and physical examination for this admission.    Objective:  Vitals:   T(C): 37.4 (04-05-22 @ 12:04), Max: 37.4 (04-05-22 @ 12:04)  HR: 85 (04-05-22 @ 12:04) (74 - 103)  BP: 172/68 (04-05-22 @ 12:04) (107/65 - 172/68)  RR: 18 (04-05-22 @ 12:04) (18 - 18)  SpO2: 94% (04-05-22 @ 12:04) (94% - 97%)    Physical Examination:  General: no acute distress  HEENT: NC/AT, EOMI  Cardio: S1, S2 heard, RRR, no murmurs  Resp: breath sounds heard bilaterally, no rales, wheezes or rhonchi  Abd: soft, NT, ND  Ext: no edema or cyanosis  Skin: warm, dry, no visible rash      Laboratory Studies:  CBC:                       11.4   10.95 )-----------( 217      ( 05 Apr 2022 04:54 )             37.4     CMP: 04-05    135  |  94<L>  |  100<H>  ----------------------------<  172<H>  5.6<H>   |  20<L>  |  7.64<H>    Ca    8.4      05 Apr 2022 00:18  Phos  12.4     04-04  Mg     3.4     04-04            Microbiology: reviewed    Culture - Urine (collected 04-03-22 @ 04:11)  Source: Clean Catch Clean Catch (Midstream)  Final Report (04-04-22 @ 12:00):    <10,000 CFU/mL Normal Urogenital Tiffany        Radiology: reviewed

## 2022-04-05 NOTE — PHYSICAL THERAPY INITIAL EVALUATION ADULT - GAIT TRAINING, PT EVAL
Pt will ambulate 200-300 ft independently with RW in 2 weeks Pt will ambulate 200-300 ft independently with RW in 4 weeks

## 2022-04-05 NOTE — DIETITIAN INITIAL EVALUATION ADULT. - OTHER INFO
patient followed by nephrology, noted: "initiation of Urgent HD, noted patient is uremic and  hyperkalemic  etiology of ANT ? infection and +/- volume depletion  1- ANT on ckd III   2- chf chronic   3- hyperkalemia   4- uremia  5- hyperphosphatemia "      I EMR: patient followed by nephrology, noted: "initiation of Urgent HD, noted patient is uremic and  hyperkalemic  etiology of ANT ? infection and +/- volume depletion  1- ANT on ckd III   2- chf chronic   3- hyperkalemia   4- uremia  5- hyperphosphatemia "      I

## 2022-04-05 NOTE — PHYSICAL THERAPY INITIAL EVALUATION ADULT - ADDITIONAL COMMENTS
CT A/P: No hydronephrosis, nephrolithiasis, or evidence of obstructive uropathy. Small bilateral pleural effusions, with suggestion of pleural thickening. Opacity within the right lower lobe with associated mild volume loss, suggestive of rounded atelectasis. Right inguinal hernia containing a nonobstructed segment of sigmoid colon, new since prior examination of 2/7/2017. CT CHEST: Small left greater than right bilateral pleural effusions increased in size compared to 2/19/2022. Lingular and left greater than right basilar opacification, likely atelectasis.  CT CHEST:

## 2022-04-05 NOTE — PHYSICAL THERAPY INITIAL EVALUATION ADULT - LIVES WITH, PROFILE
Pt lives with spouse in home with 3 steps to enter. Prior to admission patient was independent in all functional mobility and ADL's, uses a cane, walker, or motorized wheelchair for mobility/spouse

## 2022-04-05 NOTE — PROGRESS NOTE ADULT - ASSESSMENT
67 year old gentleman with PMH of CAD, NSTEMI s/p 3 stents in 2014 (stents to LAD, proximal and distal LCx), ischemic cardiomyopathy, HTN for 10 years, T2DM for 26 years c/b peripheral neuropathy, CVA, TIA, Afib on Pradaxa, chronic RLE wound, L carotid stenosis s/p endarterectomy (2014) just recently admitted  to the [facility] on 2/19/2022 with acute onset chest pain for one day found to have an NSTEMI, CAD, s/p PCI in setting of increased AF rates off bb for 3 days and resolved chest pain, his CKMB peaked and Echo noted with EF 60%,normal LV function, mild TR, mild NM. He was s/p [facility] with 85% proximal LAD s/p balloon angioplasty and LULU. He presented to [facility] ED with decreased urine output over the week. [facility]. Stevens went to [facility] for hematuria and was started  on Nitrofurantoin. Pt is still taking Nitrofurantoin w/ 5 days left. He came to the ED due to worsening symptoms. Pt reports having a similar incident 4-5 years ago that resolved spontaneously. He reports increased leg edema Dyspnea worse on exertion. his baseline Serum creatinine is in the 2.0 to 2.3 mg/dl range. ANT with serum creatinine of 8.29 with bun 144 and k 5.5        1- ANT on ckd III   2- chf chronic   3- hyperkalemia   4- uremia  5- hyperphosphatemia       Initiation of Urgent HD, given he is uremic  etiology of ANT ? infection and +/- volume depletion  HD today given he remains hyperkalemic  F160, 180 min, , , 0.5L fluid removal  see HD flowsheet.   pt is too lethargic to take lokelma, may need NGT  repeat K+ in afternoon after HD   elevated Pro-BNP  no hydro on ct abd  +U/A  blood cx pending  continue ceftriaxone 1 G daily  renvela one tab with meals  strict I/O  trend labs daily  will also need to transition groin catheter to IJ as soon as patient is optimized.

## 2022-04-05 NOTE — PROGRESS NOTE ADULT - ASSESSMENT
Patient is a 67 year old male with PMH of CAD with past multiple PCI, with most recent discharge from [place] in Feb 2022 for NSTEMI with LULU of an 85% prox LAD lesion with patient on ASA and now off Plavix per cardiology (only for one month), chronic afib on Pradaxa, HTN, type 2 DM on insulin, diabetic neuropathy with chronic RIGHT LE venous stasis changes>left, LEFT foot ulcer seen by podiatry, past endarterectomy LEFT CEA in 2014 who presented with UTI with hematuria diagnosed at urgent care and now with decreased urine output.     Acute cystitis/UTI    - noted dark urine with hematuria, went to UC and was prescribed macrobid, had some improvement in urine color but then noticed decreased urine output with no voiding reported in last 2 days   - UA here with pyuria -  with large LE, negative nitrites and no bacteria    - CTAP reviewed - no hydronephrosis, nephrolithiasis, or evidence of obstructive uropathy   - urine culture negative   - blood cultures    - continue on ceftriaxone, will use cx to guide therapy   - monitor temps/WBC    ANT on CKD3   - Nephrology following, appreciate recs   - s/p placement of shiley and initiation of HD   - monitor Cr    - renally dose meds     B/l LE edema with chronic venous stasis  Acute on chronic HFpEF   - legs cool to touch, nontender, chronic changes with no sign of infection/cellulitis   - has wound on bottom of L foot - dry and does not appear infected either   - clinically overloaded, Cardiology and Nephrology following, on IV bumex, for HD   - elevate lower extremities      DM2 with neuropathy   - Blood glucose management per primary team.    Infectious Diseases will continue to follow. Please call with any questions.   Annie King M.D.  [facility], Division of Infectious Diseases [phone]

## 2022-04-05 NOTE — PROGRESS NOTE ADULT - SUBJECTIVE AND OBJECTIVE BOX
DATE OF SERVICE: 04-05-22 @ 15:39  CHIEF COMPLAINT:Patient is a 67y old  Male who presents with a chief complaint of Decreased urine output for the past week, leg oedema (05 Apr 2022 15:15)    	        PAST MEDICAL & SURGICAL HISTORY:  HTN (hypertension), benign    HLD (hyperlipidemia)    DM type 2 (diabetes mellitus, type 2)    TIA (transient ischemic attack)    Atrial fibrillation    MI (myocardial infarction)  circa 2014 and 2022.    CAD (coronary artery disease)    Neuropathy    Stage 3 chronic kidney disease    2019 novel coronavirus disease (COVID-19)  12/2021.  Received the COVID-19 vaccine x 2 as of April 2022.    Status post angioplasty with stent  LULU x 3 2/7/2014    S/P carotid endarterectomy  left            lethargic  arousable  no apparent cp   breathing ok  no abd pain  generalized aches    Medications:  MEDICATIONS  (STANDING):  allopurinol 100 milliGRAM(s) Oral daily  aspirin enteric coated 81 milliGRAM(s) Oral daily  atorvastatin 40 milliGRAM(s) Oral at bedtime  cefTRIAXone   IVPB 1000 milliGRAM(s) IV Intermittent every 24 hours  cefTRIAXone   IVPB      chlorhexidine 2% Cloths 1 Application(s) Topical daily  dextrose 5%. 1000 milliLiter(s) (100 mL/Hr) IV Continuous <Continuous>  dextrose 5%. 1000 milliLiter(s) (50 mL/Hr) IV Continuous <Continuous>  dextrose 50% Injectable 25 Gram(s) IV Push once  dextrose 50% Injectable 12.5 Gram(s) IV Push once  dextrose 50% Injectable 25 Gram(s) IV Push once  diltiazem    milliGRAM(s) Oral daily  glucagon  Injectable 1 milliGRAM(s) IntraMuscular once  heparin  Infusion.  Unit(s)/Hr (24 mL/Hr) IV Continuous <Continuous>  insulin lispro (ADMELOG) corrective regimen sliding scale   SubCutaneous three times a day before meals  insulin lispro (ADMELOG) corrective regimen sliding scale   SubCutaneous at bedtime  metoprolol tartrate Injectable 5 milliGRAM(s) IV Push every 6 hours  sevelamer carbonate 800 milliGRAM(s) Oral three times a day with meals    MEDICATIONS  (PRN):  dextrose Oral Gel 15 Gram(s) Oral once PRN Blood Glucose LESS THAN 70 milliGRAM(s)/deciliter  heparin   Injectable 24511 Unit(s) IV Push every 6 hours PRN For aPTT less than 40  heparin   Injectable 5000 Unit(s) IV Push every 6 hours PRN For aPTT between 40 - 57    	    PHYSICAL EXAM:  T(C): 37.4 (04-05-22 @ 12:04), Max: 37.4 (04-05-22 @ 12:04)  HR: 85 (04-05-22 @ 12:04) (74 - 103)  BP: 172/68 (04-05-22 @ 12:04) (107/65 - 172/68)  RR: 18 (04-05-22 @ 12:04) (18 - 18)  SpO2: 94% (04-05-22 @ 12:04) (94% - 97%)  Wt(kg): --  I&O's Summary    04 Apr 2022 07:01  -  05 Apr 2022 07:00  --------------------------------------------------------  IN: 240 mL / OUT: 500 mL / NET: -260 mL        Appearance: Normal	  HEENT:   Normal oral mucosa,   Lymphatic: No lymphadenopathy  Cardiovascular reg irreg  Respiratory: dec bs  Gastrointestinal:  Soft, Non-tender, + BS	    Neurologic: Non-focal  Extremities:dec rom  pvd      TELEMETRY: 	    ECG:  	  RADIOLOGY:  OTHER: 	  	  LABS:	 	    CARDIAC MARKERS:                                11.4   10.95 )-----------( 217      ( 05 Apr 2022 04:54 )             37.4     04-05    135  |  94<L>  |  100<H>  ----------------------------<  172<H>  5.6<H>   |  20<L>  |  7.64<H>    Ca    8.4      05 Apr 2022 00:18  Phos  12.4     04-04  Mg     3.4     04-04      proBNP:   Lipid Profile:   HgA1c:   TSH: Thyroid Stimulating Hormone, Serum: 1.98 uIU/mL (04-05 @ 05:19)

## 2022-04-05 NOTE — CONSULT NOTE ADULT - ASSESSMENT
Vitals stable. Maintaining airway. AMS likely 2/2 sedation medications (lyrica, oxycodone) in the setting of inadequate clearance due to renal dysfunction (oxy given after HD). Also a consideration is dialysis dysequilibrium syndrome (DDS) although he has only had 500cc off with each session and both were run over multiple hours.    Not a MICU candidate at this time.  Patient at high risk for MOR/OHS given body habitus but not hypercapnic so no need for Bipap currently  If large swing in BUN could make DDS more likely  Please dose medications appropriately for HD  Hold sedating medications  CT findings of pneumonia vs atelectasis, given uptrending WBC would do infectious workup (and sputum culture if producing sputum). 67M CAD s/p PCI last for LAD NSTEMI in 2/22, DM2, PVD, s/p CEA, CVA, AF presented with oliguria found to have ANT vs progression of CKD now initiated on HD. MICU consulted for AMS that began this afternoon after HD after dose of percocet was given.    Vitals stable. Maintaining airway. AMS likely 2/2 sedation medications (lyrica, oxycodone) in the setting of inadequate clearance due to renal dysfunction (oxy given after HD). Also a consideration is dialysis dysequilibrium syndrome (DDS) although he has only had 500cc off with each session and both were run over multiple hours.    Not a MICU candidate at this time.  Patient at high risk for MOR/OHS given body habitus but not hypercapnic so no need for Bipap currently  If large swing in BUN would make DDS more likely  Please dose medications appropriately for HD  Hold sedating medications  CT findings of pneumonia vs atelectasis, given up-trending WBC would do infectious workup (and sputum culture if producing sputum).    Discussed with Dr Bartholomew 67M CAD s/p PCI last for LAD NSTEMI in 2/22, DM2, PVD, s/p CEA, CVA, AF presented with oliguria found to have ANT vs progression of CKD now initiated on HD. MICU consulted for AMS that began this afternoon after HD after dose of percocet was given.    Vitals stable. Maintaining airway. AMS likely 2/2 sedation medications (lyrica, oxycodone) in the setting of inadequate clearance due to renal dysfunction (oxy given after HD). Also a consideration is dialysis dysequilibrium syndrome (DDS) although he has only had 500cc off with each session and both were run over multiple hours.    - Not a MICU candidate at this time.  - Patient at high risk for MOR/OHS given body habitus but not hypercapnic so no need for Bipap currently  - If large swing in BUN would make DDS more likely  - Please dose medications appropriately for HD  - Hold sedating medications  - CT findings of pneumonia vs atelectasis, given up-trending WBC would do infectious workup (and sputum culture if producing sputum).    Discussed with Dr Bartholomew

## 2022-04-05 NOTE — PROGRESS NOTE ADULT - SUBJECTIVE AND OBJECTIVE BOX
[facility]   [phone]  RENAL FOLLOW UP NOTE  --------------------------------------------------------------------------------  Chief Complaint:    24 hour events/subjective:    Patient seen and examined with Dr. Art coyne lethargic today  moaning in pain    PAST HISTORY  --------------------------------------------------------------------------------  No significant changes to PMH, PSH, FHx, SHx, unless otherwise noted    ALLERGIES & MEDICATIONS  --------------------------------------------------------------------------------  Allergies    nitrofurantoin (Nephrotoxicity)    Intolerances      Standing Inpatient Medications  allopurinol 100 milliGRAM(s) Oral daily  aspirin enteric coated 81 milliGRAM(s) Oral daily  atorvastatin 40 milliGRAM(s) Oral at bedtime  cefTRIAXone   IVPB 1000 milliGRAM(s) IV Intermittent every 24 hours  cefTRIAXone   IVPB      chlorhexidine 2% Cloths 1 Application(s) Topical daily  dextrose 5%. 1000 milliLiter(s) IV Continuous <Continuous>  dextrose 5%. 1000 milliLiter(s) IV Continuous <Continuous>  dextrose 50% Injectable 25 Gram(s) IV Push once  dextrose 50% Injectable 12.5 Gram(s) IV Push once  dextrose 50% Injectable 25 Gram(s) IV Push once  diltiazem    milliGRAM(s) Oral daily  glucagon  Injectable 1 milliGRAM(s) IntraMuscular once  heparin  Infusion.  Unit(s)/Hr IV Continuous <Continuous>  insulin lispro (ADMELOG) corrective regimen sliding scale   SubCutaneous three times a day before meals  insulin lispro (ADMELOG) corrective regimen sliding scale   SubCutaneous at bedtime  metoprolol succinate  milliGRAM(s) Oral daily  sevelamer carbonate 800 milliGRAM(s) Oral three times a day with meals    PRN Inpatient Medications  dextrose Oral Gel 15 Gram(s) Oral once PRN  heparin   Injectable 81687 Unit(s) IV Push every 6 hours PRN  heparin   Injectable 5000 Unit(s) IV Push every 6 hours PRN      REVIEW OF SYSTEMS  --------------------------------------------------------------------------------    Patient is unable to give ROS    VITALS/PHYSICAL EXAM  --------------------------------------------------------------------------------  T(C): 36.9 (04-05-22 @ 08:07), Max: 37 (04-04-22 @ 23:43)  HR: 103 (04-05-22 @ 08:07) (74 - 103)  BP: 158/76 (04-05-22 @ 08:07) (107/65 - 158/76)  RR: 18 (04-05-22 @ 08:07) (18 - 18)  SpO2: 95% (04-05-22 @ 08:07) (94% - 97%)  Wt(kg): --        04-04-22 @ 07:01  -  04-05-22 @ 07:00  --------------------------------------------------------  IN: 240 mL / OUT: 500 mL / NET: -260 mL      Physical Exam:    	Gen: more lethargic today, not opening eyes, on O2,    	Pulm: Decreased breath sounds b/l bases. +crackles. no ronchi or wheezing  	CV: No JVD. IRR, S1/S2.   	Abd: +BS, soft, nontender, softly distended, obese   	: No suprapubic tenderness.              Extremity UE: increased warmth, swollen fingers B/L              Extremity LE: + hyperpigmented bilateral LE with B/L LE erythema and increased warmth, non-pitting edema  	Neuro: lethargic, answering questions appropriately, slow to respond	              Vascular: [facility] HD cath    LABS/STUDIES  --------------------------------------------------------------------------------              11.4   10.95 >-----------<  217      [04-05-22 @ 04:54]              37.4     135  |  94  |  100  ----------------------------<  172      [04-05-22 @ 00:18]  5.6   |  20  |  7.64        Ca     8.4     [04-05-22 @ 00:18]      Mg     3.4     [04-04-22 @ 06:27]      Phos  12.4     [04-04-22 @ 06:27]      PT/INR: PT 21.9 , INR 1.89       [04-05-22 @ 00:17]  PTT: 157.8      [04-05-22 @ 00:17]      Creatinine Trend:  SCr 7.64 [04-05 @ 00:18]  SCr 8.51 [04-04 @ 06:27]  SCr 8.29 [04-03 @ 06:58]  SCr 7.94 [04-03 @ 05:30]  SCr 7.44 [04-02 @ 18:27]              Urinalysis - [04-03-22 @ 00:48]      Color Light Orange / Appearance Turbid / SG 1.021 / pH 5.5      Gluc Negative / Ketone Negative  / Bili Negative / Urobili Negative       Blood Large / Protein 300 mg/dL / Leuk Est Large / Nitrite Negative      RBC 20 /  / Hyaline 10 / Gran  / Sq Epi  / Non Sq Epi 3 / Bacteria Negative    Urine Creatinine 153      [04-03-22 @ 00:48]  Urine Protein 71      [04-03-22 @ 00:48]  Urine Sodium 11      [04-03-22 @ 00:48]  Urine Chloride <20      [04-03-22 @ 00:48]    Iron 31, TIBC 243, %sat 13      [04-04-22 @ 00:17]  Ferritin 152      [04-03-22 @ 23:06]  PTH -- (Ca --)      [04-03-22 @ 23:06]   97  HbA1c 11.7      [11-07-19 @ 09:14]  TSH 1.98      [04-05-22 @ 05:19]

## 2022-04-05 NOTE — CONSULT NOTE ADULT - SUBJECTIVE AND OBJECTIVE BOX
CHIEF COMPLAINT: oliguria    HPI:  67M PMH CAD with past multiple PCI, with most recent discharge from [facility] in 2022 for NSTEMI with LULU of an 85% prox LAD lesion with patient on ASA and now off Plavix per cardiology (only for one month), chronic atrial fibrillation maintained on Pradaxa, essential HTN, type 2 DM previously on oral medications but on insulin, diabetic neuropathy with chronic RIGHT LE venous stasis changes>left, LEFT foot ulcer seen by podiatry, past endarterectomy LEFT CEA in . with patient self referring to the ER following apparently treatment by an urgent care center for an apparent cystitis with hematuria on nitrofurantoin but with patient noting decreased urine output for the past week, and difficulty with B/L coarse tremors for several weeks, with patient having difficulty negotiating his applications on his smart phone (observed by examiner).  Patient with increasing B/L LE oedema and weight gain, with patient with 2 pillow orthopnoea.      Patient was recommended for a Prado but patient refused, patient wishing to review the issue with his daughter.  Patient with past history of COVID-19 in 2021 and has received the COVID-19 vaccine x 2.  NO fever, no chills, no rigors.  No diaphoresis.  No back pain, no tearing back pain. (2022 06:29)    Hospital Course:  Patient was found to have worsening renal function, ANT on CKD most likely progression of underlying hypertensive/low-flow/diabetic nephropathy although component of UTI is also a consideration. He had shiley placed by vascular surgery on 4/3 and has had HD on 4/3 and . After HD on  patient was complaining of pain and was given percocet 10mg and since then has been lethargic. MICU called for letheragy.      PAST MEDICAL & SURGICAL HISTORY:  HTN (hypertension), benign    HLD (hyperlipidemia)    DM type 2 (diabetes mellitus, type 2)    TIA (transient ischemic attack)    Atrial fibrillation    MI (myocardial infarction)  circa  and .    CAD (coronary artery disease)    Neuropathy    Stage 3 chronic kidney disease    2019 novel coronavirus disease (COVID-19)  2021.  Received the COVID-19 vaccine x 2 as of 2022.    Status post angioplasty with stent  LULU x 3 2014    S/P carotid endarterectomy  left        FAMILY HISTORY:  Family history of heart disease  father    Family history of CVA  mother        SOCIAL HISTORY:  Smoking: __ packs x ___ years  EtOH Use:  Marital Status:  Occupation:  Recent Travel:  Country of Birth:  Advance Directives:    Allergies    nitrofurantoin (Nephrotoxicity)    Intolerances        HOME MEDICATIONS:    REVIEW OF SYSTEMS:  Constitutional:   Eyes:  ENT:  CV:  Resp:  GI:  :  MSK:  Integumentary:  Neurological:  Psychiatric:  Endocrine:  Hematologic/Lymphatic:  Allergic/Immunologic:  [ ] All other systems negative  [x ] Unable to assess ROS because AMS    OBJECTIVE:  ICU Vital Signs Last 24 Hrs  T(C): 37 (2022 23:43), Max: 37 (2022 23:43)  T(F): 98.6 (2022 23:43), Max: 98.6 (2022 23:43)  HR: 80 (2022 23:43) (74 - 98)  BP: 140/66 (2022 23:43) (105/60 - 144/62)  BP(mean): 80 (2022 12:40) (80 - 84)  ABP: --  ABP(mean): --  RR: 18 (2022 23:43) (18 - 20)  SpO2: 97% (2022 23:43) (92% - 97%)         @ 07: @ 07:00  --------------------------------------------------------  IN: 900 mL / OUT: 1300 mL / NET: -400 mL     @ 07:01  -  -05 @ 00:32  --------------------------------------------------------  IN: 240 mL / OUT: 500 mL / NET: -260 mL      CAPILLARY BLOOD GLUCOSE      POCT Blood Glucose.: 149 mg/dL (2022 21:40)      PHYSICAL EXAM:  General: obese gentleman sleeping in NAD. Arouseable to voice but falls back asleep after a few seconds  HEENT: mallampati 4, increased neck circumference, PERRL, EOMI, maintaining airway   Respiratory: CTABL  Cardiovascular: irregularly irregular no MRG  Abdomen: soft nontender mildly distended  Extremities: brawny discoloration and edema of b/l LE  Skin: scattered ecchymoses  Neurological: A&O 0, moving all extremities, intermittently following commands. arouses to voice or touch but returns to sleep      HOSPITAL MEDICATIONS:  MEDICATIONS  (STANDING):  allopurinol 100 milliGRAM(s) Oral daily  aspirin enteric coated 81 milliGRAM(s) Oral daily  atorvastatin 40 milliGRAM(s) Oral at bedtime  cefTRIAXone   IVPB 1000 milliGRAM(s) IV Intermittent every 24 hours  cefTRIAXone   IVPB      chlorhexidine 2% Cloths 1 Application(s) Topical daily  dextrose 5%. 1000 milliLiter(s) (100 mL/Hr) IV Continuous <Continuous>  dextrose 5%. 1000 milliLiter(s) (50 mL/Hr) IV Continuous <Continuous>  dextrose 50% Injectable 25 Gram(s) IV Push once  dextrose 50% Injectable 12.5 Gram(s) IV Push once  dextrose 50% Injectable 25 Gram(s) IV Push once  diltiazem    milliGRAM(s) Oral daily  glucagon  Injectable 1 milliGRAM(s) IntraMuscular once  heparin  Infusion.  Unit(s)/Hr (24 mL/Hr) IV Continuous <Continuous>  insulin lispro (ADMELOG) corrective regimen sliding scale   SubCutaneous three times a day before meals  insulin lispro (ADMELOG) corrective regimen sliding scale   SubCutaneous at bedtime  metoprolol succinate  milliGRAM(s) Oral daily  sevelamer carbonate 800 milliGRAM(s) Oral three times a day with meals    MEDICATIONS  (PRN):  dextrose Oral Gel 15 Gram(s) Oral once PRN Blood Glucose LESS THAN 70 milliGRAM(s)/deciliter  heparin   Injectable 01067 Unit(s) IV Push every 6 hours PRN For aPTT less than 40  heparin   Injectable 5000 Unit(s) IV Push every 6 hours PRN For aPTT between 40 - 57      LABS:                        11.6   11.26 )-----------( 214      ( 2022 00:17 )             37.5     04-04    134<L>  |  91<L>  |  119<H>  ----------------------------<  174<H>  5.9<H>   |  17<L>  |  8.51<H>    Ca    8.3<L>      2022 06:27  Phos  12.4     -  Mg     3.4     -    TPro  7.6  /  Alb  3.3  /  TBili  0.5  /  DBili  x   /  AST  15  /  ALT  5<L>  /  AlkPhos  107  -    PT/INR - ( 2022 14:07 )   PT: 21.0 sec;   INR: 1.82 ratio         PTT - ( 2022 14:07 )  PTT:63.4 sec  Urinalysis Basic - ( 2022 00:48 )    Color: Light Orange / Appearance: Turbid / S.021 / pH: x  Gluc: x / Ketone: Negative  / Bili: Negative / Urobili: Negative   Blood: x / Protein: 300 mg/dL / Nitrite: Negative   Leuk Esterase: Large / RBC: 20 /hpf /  /HPF   Sq Epi: x / Non Sq Epi: 3 /hpf / Bacteria: Negative      Arterial Blood Gas:   @ 00:12  7.29/46/98/22/98.4/-4.5  ABG lactate: --        MICROBIOLOGY:     RADIOLOGY:  < from: CT Chest No Cont (22 @ 16:52) >  IMPRESSION:.    Small left greater than right bilateral pleural effusions increased in   size compared to 2022.    Lingular and left greater than right basilar opacification, likely   atelectasis.    < end of copied text >      EKG on monitor: rate-controlled AF CHIEF COMPLAINT: oliguria    HPI:  67M PMH CAD with past multiple PCI, with most recent discharge from [facility] in 2022 for NSTEMI with LULU of an 85% prox LAD lesion with patient on ASA and now off Plavix per cardiology (only for one month), chronic atrial fibrillation maintained on Pradaxa, essential HTN, type 2 DM previously on oral medications but on insulin, diabetic neuropathy with chronic RIGHT LE venous stasis changes>left, LEFT foot ulcer seen by podiatry, past endarterectomy LEFT CEA in . with patient self referring to the ER following apparently treatment by an urgent care center for an apparent cystitis with hematuria on nitrofurantoin but with patient noting decreased urine output for the past week, and difficulty with B/L coarse tremors for several weeks, with patient having difficulty negotiating his applications on his smart phone (observed by examiner).  Patient with increasing B/L LE oedema and weight gain, with patient with 2 pillow orthopnoea.      Patient was recommended for a Prado but patient refused, patient wishing to review the issue with his daughter.  Patient with past history of COVID-19 in 2021 and has received the COVID-19 vaccine x 2.  NO fever, no chills, no rigors.  No diaphoresis.  No back pain, no tearing back pain. (2022 06:29)    Hospital Course:  Patient was found to have worsening renal function, ANT on CKD most likely progression of underlying hypertensive/low-flow/diabetic nephropathy although component of UTI is also a consideration. He had shiley placed by vascular surgery on 4/3 and has had HD on 4/3 and . After HD on  patient was complaining of pain and was given percocet 10mg and since then has been lethargic. MICU called for letheragy.      PAST MEDICAL & SURGICAL HISTORY:  HTN (hypertension), benign    HLD (hyperlipidemia)    DM type 2 (diabetes mellitus, type 2)    TIA (transient ischemic attack)    Atrial fibrillation    MI (myocardial infarction)  circa  and .    CAD (coronary artery disease)    Neuropathy    Stage 3 chronic kidney disease    2019 novel coronavirus disease (COVID-19)  2021.  Received the COVID-19 vaccine x 2 as of 2022.    Status post angioplasty with stent  LULU x 3 2014    S/P carotid endarterectomy  left      FAMILY HISTORY:  Family history of heart disease  father    Family history of CVA  mother        SOCIAL HISTORY:  patient unable to respond due to encephalopathy  per chart:  NO tobacco or ethanol reported.  Lives with spouse.  Supportive adult daughter.      Allergies  nitrofurantoin (Nephrotoxicity)      REVIEW OF SYSTEMS:  [x ] Unable to assess ROS because acute encephalopathy    OBJECTIVE:  ICU Vital Signs Last 24 Hrs  T(C): 37 (2022 23:43), Max: 37 (2022 23:43)  T(F): 98.6 (2022 23:43), Max: 98.6 (2022 23:43)  HR: 80 (2022 23:43) (74 - 98)  BP: 140/66 (2022 23:43) (105/60 - 144/62)  BP(mean): 80 (2022 12:40) (80 - 84)  RR: 18 (2022 23:43) (18 - 20)  SpO2: 97% (2022 23:43) (92% - 97%)         @ 07:04 @ 07:00  --------------------------------------------------------  IN: 900 mL / OUT: 1300 mL / NET: -400 mL     @ 07:01  -  04-05 @ 00:32  --------------------------------------------------------  IN: 240 mL / OUT: 500 mL / NET: -260 mL      CAPILLARY BLOOD GLUCOSE  POCT Blood Glucose.: 149 mg/dL (2022 21:40)      PHYSICAL EXAM:  General: obese gentleman sleeping in NAD. Arouseable to voice but falls back asleep after a few seconds  HEENT: mallampati 4, increased neck circumference, PERRL, EOMI, maintaining airway   Respiratory: CTABL  Cardiovascular: irregularly irregular no MRG  Abdomen: soft nontender mildly distended  Extremities: brawny discoloration and edema of b/l LE  Skin: scattered ecchymoses  Neurological: A&O 0, moving all extremities, intermittently following commands. arouses to voice or touch but returns to sleep      HOSPITAL MEDICATIONS:  MEDICATIONS  (STANDING):  allopurinol 100 milliGRAM(s) Oral daily  aspirin enteric coated 81 milliGRAM(s) Oral daily  atorvastatin 40 milliGRAM(s) Oral at bedtime  cefTRIAXone   IVPB 1000 milliGRAM(s) IV Intermittent every 24 hours  cefTRIAXone   IVPB      chlorhexidine 2% Cloths 1 Application(s) Topical daily  dextrose 5%. 1000 milliLiter(s) (100 mL/Hr) IV Continuous <Continuous>  dextrose 5%. 1000 milliLiter(s) (50 mL/Hr) IV Continuous <Continuous>  dextrose 50% Injectable 25 Gram(s) IV Push once  dextrose 50% Injectable 12.5 Gram(s) IV Push once  dextrose 50% Injectable 25 Gram(s) IV Push once  diltiazem    milliGRAM(s) Oral daily  glucagon  Injectable 1 milliGRAM(s) IntraMuscular once  heparin  Infusion.  Unit(s)/Hr (24 mL/Hr) IV Continuous <Continuous>  insulin lispro (ADMELOG) corrective regimen sliding scale   SubCutaneous three times a day before meals  insulin lispro (ADMELOG) corrective regimen sliding scale   SubCutaneous at bedtime  metoprolol succinate  milliGRAM(s) Oral daily  sevelamer carbonate 800 milliGRAM(s) Oral three times a day with meals    MEDICATIONS  (PRN):  dextrose Oral Gel 15 Gram(s) Oral once PRN Blood Glucose LESS THAN 70 milliGRAM(s)/deciliter  heparin   Injectable 68038 Unit(s) IV Push every 6 hours PRN For aPTT less than 40  heparin   Injectable 5000 Unit(s) IV Push every 6 hours PRN For aPTT between 40 - 57      LABS:                        11.6   11.26 )-----------( 214      ( 2022 00:17 )             37.5     04-04    134<L>  |  91<L>  |  119<H>  ----------------------------<  174<H>  5.9<H>   |  17<L>  |  8.51<H>    Ca    8.3<L>      2022 06:27  Phos  12.4     04-04  Mg     3.4     04-    TPro  7.6  /  Alb  3.3  /  TBili  0.5  /  DBili  x   /  AST  15  /  ALT  5<L>  /  AlkPhos  107  -    PT/INR - ( 2022 14:07 )   PT: 21.0 sec;   INR: 1.82 ratio         PTT - ( 2022 14:07 )  PTT:63.4 sec  Urinalysis Basic - ( 2022 00:48 )    Color: Light Orange / Appearance: Turbid / S.021 / pH: x  Gluc: x / Ketone: Negative  / Bili: Negative / Urobili: Negative   Blood: x / Protein: 300 mg/dL / Nitrite: Negative   Leuk Esterase: Large / RBC: 20 /hpf /  /HPF   Sq Epi: x / Non Sq Epi: 3 /hpf / Bacteria: Negative      Arterial Blood Gas:   @ 00:12  7.29/46/98/22/98.4/-4.5  ABG lactate: --      RADIOLOGY:  Personally reviewed.       < from: CT Chest No Cont (22 @ 16:52) >  IMPRESSION:.    Small left greater than right bilateral pleural effusions increased in   size compared to 2022.    Lingular and left greater than right basilar opacification, likely   atelectasis.    < end of copied text >      EKG on monitor: rate-controlled AF

## 2022-04-05 NOTE — DIETITIAN INITIAL EVALUATION ADULT. - PERTINENT MEDS FT
MEDICATIONS  (STANDING):  allopurinol 100 milliGRAM(s) Oral daily  aspirin enteric coated 81 milliGRAM(s) Oral daily  atorvastatin 40 milliGRAM(s) Oral at bedtime  cefTRIAXone   IVPB 1000 milliGRAM(s) IV Intermittent every 24 hours  cefTRIAXone   IVPB      chlorhexidine 2% Cloths 1 Application(s) Topical daily  dextrose 5%. 1000 milliLiter(s) (100 mL/Hr) IV Continuous <Continuous>  dextrose 5%. 1000 milliLiter(s) (50 mL/Hr) IV Continuous <Continuous>  dextrose 50% Injectable 25 Gram(s) IV Push once  dextrose 50% Injectable 12.5 Gram(s) IV Push once  dextrose 50% Injectable 25 Gram(s) IV Push once  diltiazem    milliGRAM(s) Oral daily  glucagon  Injectable 1 milliGRAM(s) IntraMuscular once  heparin  Infusion.  Unit(s)/Hr (24 mL/Hr) IV Continuous <Continuous>  insulin lispro (ADMELOG) corrective regimen sliding scale   SubCutaneous three times a day before meals  insulin lispro (ADMELOG) corrective regimen sliding scale   SubCutaneous at bedtime  metoprolol tartrate Injectable 5 milliGRAM(s) IV Push every 6 hours  sevelamer carbonate 800 milliGRAM(s) Oral three times a day with meals

## 2022-04-05 NOTE — DIETITIAN INITIAL EVALUATION ADULT. - REASON INDICATOR FOR ASSESSMENT
Nutrition Support, DMII, ANT, Obesity.   "67y M withcomplex medical history of CAD, multiple past PCI with most recent PCI in Feb 2022 following a non ST elevation MI, type 2 DM on insulin with complicating peripheral neuropathy, past CVA and LEFT CEA with no residual deficits, chronic RIGHT>left induration with poorly healing ulcers, chronic atrial fibrillation"

## 2022-04-05 NOTE — DIETITIAN INITIAL EVALUATION ADULT. - ADD RECOMMEND
Add Nepro x2 daily; provide total feeding assistance; consider Enteral nutrition support for prolonged inadequate intake; consult nutrtion for Tube feeding recommendation

## 2022-04-05 NOTE — PROGRESS NOTE ADULT - NS ATTEND AMEND GEN_ALL_CORE FT
ct chest small effusions and atelectasis l>r  abg 7.29/46/98  maintain sat>90% w o2 as needed  continue abx  dw family

## 2022-04-05 NOTE — PROGRESS NOTE ADULT - SUBJECTIVE AND OBJECTIVE BOX
Follow-up Pulm Progress Note    Overnight events noted  Lethargic but arousable  O2 sats 94% on 2LNC.    Medications:  MEDICATIONS  (STANDING):  allopurinol 100 milliGRAM(s) Oral daily  aspirin enteric coated 81 milliGRAM(s) Oral daily  atorvastatin 40 milliGRAM(s) Oral at bedtime  cefTRIAXone   IVPB 1000 milliGRAM(s) IV Intermittent every 24 hours  cefTRIAXone   IVPB      chlorhexidine 2% Cloths 1 Application(s) Topical daily  dextrose 5%. 1000 milliLiter(s) (100 mL/Hr) IV Continuous <Continuous>  dextrose 5%. 1000 milliLiter(s) (50 mL/Hr) IV Continuous <Continuous>  dextrose 50% Injectable 25 Gram(s) IV Push once  dextrose 50% Injectable 12.5 Gram(s) IV Push once  dextrose 50% Injectable 25 Gram(s) IV Push once  diltiazem    milliGRAM(s) Oral daily  glucagon  Injectable 1 milliGRAM(s) IntraMuscular once  heparin  Infusion.  Unit(s)/Hr (24 mL/Hr) IV Continuous <Continuous>  insulin lispro (ADMELOG) corrective regimen sliding scale   SubCutaneous three times a day before meals  insulin lispro (ADMELOG) corrective regimen sliding scale   SubCutaneous at bedtime  metoprolol tartrate Injectable 5 milliGRAM(s) IV Push every 6 hours  sevelamer carbonate 800 milliGRAM(s) Oral three times a day with meals    MEDICATIONS  (PRN):  dextrose Oral Gel 15 Gram(s) Oral once PRN Blood Glucose LESS THAN 70 milliGRAM(s)/deciliter  heparin   Injectable 46656 Unit(s) IV Push every 6 hours PRN For aPTT less than 40  heparin   Injectable 5000 Unit(s) IV Push every 6 hours PRN For aPTT between 40 - 57    Vital Signs Last 24 Hrs  T(C): 37.4 (05 Apr 2022 12:04), Max: 37.4 (05 Apr 2022 12:04)  T(F): 99.3 (05 Apr 2022 12:04), Max: 99.3 (05 Apr 2022 12:04)  HR: 85 (05 Apr 2022 12:04) (74 - 103)  BP: 172/68 (05 Apr 2022 12:04) (107/65 - 172/68)  BP(mean): --  RR: 18 (05 Apr 2022 12:04) (18 - 18)  SpO2: 94% (05 Apr 2022 12:04) (94% - 97%)    ABG - ( 05 Apr 2022 00:12 )  pH, Arterial: 7.29  pH, Blood: x     /  pCO2: 46    /  pO2: 98    / HCO3: 22    / Base Excess: -4.5  /  SaO2: 98.4          04-04 @ 07:01  -  04-05 @ 07:00  --------------------------------------------------------  IN: 240 mL / OUT: 500 mL / NET: -260 mL      LABS:                        11.4   10.95 )-----------( 217      ( 05 Apr 2022 04:54 )             37.4     04-05    135  |  94<L>  |  100<H>  ----------------------------<  172<H>  5.6<H>   |  20<L>  |  7.64<H>    Ca    8.4      05 Apr 2022 00:18  Phos  12.4     04-04  Mg     3.4     04-04    CAPILLARY BLOOD GLUCOSE  POCT Blood Glucose.: 168 mg/dL (05 Apr 2022 11:31)    PT/INR - ( 05 Apr 2022 10:20 )   PT: 21.1 sec;   INR: 1.81 ratio         PTT - ( 05 Apr 2022 10:20 )  PTT:133.3 sec    Serum Pro-Brain Natriuretic Peptide: 26641 pg/mL (04-03-22 @ 18:56)  Serum Pro-Brain Natriuretic Peptide: 44105 pg/mL (04-03-22 @ 06:58)    CULTURES:       Culture - Urine (collected 04-03-22 @ 04:11)  Source: Clean Catch Clean Catch (Midstream)  Final Report (04-04-22 @ 12:00):    <10,000 CFU/mL Normal Urogenital Tiffany    Physical Examination:  PULM: Decreased BS at bases  CVS: RRR    RADIOLOGY REVIEWED      CT chest: < from: CT Chest No Cont (04.04.22 @ 16:52) >  FINDINGS:    AIRWAYS, LUNGS, PLEURA: Tracheal secretions. Small left greater than   right pleural effusions increased compared to 2/19/2022. Right-sided   pleural thickening. Lingular and left greater than right basilar   opacification, likely atelectasis.    MEDIASTINUM: Cardiomegaly. No pericardial effusion. Thoracic aorta normal   caliber.  No large mediastinal lymph nodes.    IMAGED ABDOMEN: Nonspecific perinephric stranding.    SOFT TISSUES: Unremarkable.    BONES: Unremarkable.      IMPRESSION:.    Small left greater than right bilateral pleural effusions increased in   size compared to 2/19/2022.    Lingular and left greater than right basilar opacification, likely   atelectasis.      < end of copied text >

## 2022-04-05 NOTE — PROGRESS NOTE ADULT - SUBJECTIVE AND OBJECTIVE BOX
CARDIOLOGY FOLLOW UP - Dr. Mazariegos  Date of Service: 4/5/22  CC: remains lethargic   family at bedside   events noted     Review of Systems:  DEVANG    PHYSICAL EXAM:  T(C): 37.4 (04-05-22 @ 12:04), Max: 37.4 (04-05-22 @ 12:04)  HR: 85 (04-05-22 @ 12:04) (74 - 103)  BP: 172/68 (04-05-22 @ 12:04) (107/65 - 172/68)  RR: 18 (04-05-22 @ 12:04) (18 - 18)  SpO2: 94% (04-05-22 @ 12:04) (94% - 97%)  Wt(kg): --  I&O's Summary    04 Apr 2022 07:01  -  05 Apr 2022 07:00  --------------------------------------------------------  IN: 240 mL / OUT: 500 mL / NET: -260 mL        Appearance: NAD   Cardiovascular:  irreg , No JVD, No murmurs  Respiratory: Lungs clear to auscultation	  Gastrointestinal:  Soft, Non-tender, + BS	  Extremities: Normal range of motion, No clubbing, cyanosis ++ bl le edema      Home Medications:  allopurinol 100 mg oral tablet: 1 tab(s) orally once a day (03 Apr 2022 06:34)  aspirin 81 mg oral delayed release tablet: 1 tab(s) orally once a day (03 Apr 2022 06:34)  bumetanide 2 mg oral tablet: 1 tab(s) orally once a day (03 Apr 2022 06:34)  insulin glargine 100 units/mL subcutaneous solution: 25 unit(s) subcutaneous once a day (at bedtime) (03 Apr 2022 06:34)  metOLazone 5 mg oral tablet: 1 tab(s) orally 3 times a week (03 Apr 2022 06:34)  metoprolol succinate 50 mg oral tablet, extended release: 2 tab(s) orally once a day (03 Apr 2022 06:34)  NovoLOG 100 units/mL subcutaneous solution: subcutaneous 4 times a day (before meals and at bedtime) (03 Apr 2022 06:34)  NovoLOG FlexPen 100 units/mL injectable solution: 10 unit(s) subcutaneous 3 times a day (03 Apr 2022 06:34)  oxycodone-acetaminophen 5 mg-325 mg oral tablet: 1 tab(s) orally 2 times a day (03 Apr 2022 06:34)  Pradaxa 150 mg oral capsule: 1 cap(s) orally 2 times a day (03 Apr 2022 06:34)  pregabalin 100 mg oral capsule: 1 cap(s) orally 3 times a day (03 Apr 2022 06:34)      MEDICATIONS  (STANDING):  allopurinol 100 milliGRAM(s) Oral daily  aspirin enteric coated 81 milliGRAM(s) Oral daily  atorvastatin 40 milliGRAM(s) Oral at bedtime  cefTRIAXone   IVPB 1000 milliGRAM(s) IV Intermittent every 24 hours  cefTRIAXone   IVPB      chlorhexidine 2% Cloths 1 Application(s) Topical daily  dextrose 5%. 1000 milliLiter(s) (100 mL/Hr) IV Continuous <Continuous>  dextrose 5%. 1000 milliLiter(s) (50 mL/Hr) IV Continuous <Continuous>  dextrose 50% Injectable 25 Gram(s) IV Push once  dextrose 50% Injectable 12.5 Gram(s) IV Push once  dextrose 50% Injectable 25 Gram(s) IV Push once  diltiazem    milliGRAM(s) Oral daily  glucagon  Injectable 1 milliGRAM(s) IntraMuscular once  heparin  Infusion.  Unit(s)/Hr (24 mL/Hr) IV Continuous <Continuous>  insulin lispro (ADMELOG) corrective regimen sliding scale   SubCutaneous three times a day before meals  insulin lispro (ADMELOG) corrective regimen sliding scale   SubCutaneous at bedtime  metoprolol succinate  milliGRAM(s) Oral daily  sevelamer carbonate 800 milliGRAM(s) Oral three times a day with meals      TELEMETRY: afib 80-100s  	    ECG:  	  RADIOLOGY:  < from: CT Chest No Cont (04.04.22 @ 16:52) >  FINDINGS:    AIRWAYS, LUNGS, PLEURA: Tracheal secretions. Small left greater than   right pleural effusions increased compared to 2/19/2022. Right-sided   pleural thickening. Lingular and left greater than right basilar   opacification, likely atelectasis.    MEDIASTINUM: Cardiomegaly. No pericardial effusion. Thoracic aorta normal   caliber.  No large mediastinal lymph nodes.    IMAGED ABDOMEN: Nonspecific perinephric stranding.    SOFT TISSUES: Unremarkable.    BONES: Unremarkable.      IMPRESSION:.    Small left greater than right bilateral pleural effusions increased in   size compared to 2/19/2022.    Lingular and left greater than right basilar opacification, likely   atelectasis.    < end of copied text >    DIAGNOSTIC TESTING:  [ ] Echocardiogram:  [ ]  Catheterization:  [ ] Stress Test:    OTHER: 	    LABS:	 	                            11.4   10.95 )-----------( 217      ( 05 Apr 2022 04:54 )             37.4     04-05    135  |  94<L>  |  100<H>  ----------------------------<  172<H>  5.6<H>   |  20<L>  |  7.64<H>    Ca    8.4      05 Apr 2022 00:18  Phos  12.4     04-04  Mg     3.4     04-04      PT/INR - ( 05 Apr 2022 10:20 )   PT: 21.1 sec;   INR: 1.81 ratio         PTT - ( 05 Apr 2022 10:20 )  PTT:133.3 sec

## 2022-04-05 NOTE — PHYSICAL THERAPY INITIAL EVALUATION ADULT - PLANNED THERAPY INTERVENTIONS, PT EVAL
balance training/bed mobility training/gait training/strengthening/transfer training GOAL: Pt will ascend/descend 2 steps (I) with U HR and step to step pattern in 4 weeks./balance training/bed mobility training/gait training/strengthening/transfer training

## 2022-04-05 NOTE — DIETITIAN INITIAL EVALUATION ADULT. - ORAL INTAKE PTA/DIET HISTORY
patient noted with lethargy Patient visited in room, his daughter present. Patient  not able to provide history at this time, undergoing HD at bedside noted with lethargy, not awake. Per patient's daughter, patient was "very good with his diet and glucose control." He liked to have a garden with fresh vegetables. No weight loss per daughter. Patient had never been on HD before.  Noted in EMR ANT on CKD. Patient was not following any restrictions except Diabetic diet , he liked salt added to his food, she discouraged him

## 2022-04-05 NOTE — PROGRESS NOTE ADULT - NS ATTEND AMEND GEN_ALL_CORE FT
Seen, examined with  and  agree with above as scribed by NP Olesya    ANT on ckd with AMS as well as hyperkalemia  HD as planned above   d/w pt daughter at bedside

## 2022-04-06 NOTE — PROGRESS NOTE ADULT - ASSESSMENT
Assessment/plan:    Chronic left foot ulceration: Present on admission, noninfected  Diabetes mellitus    Recommend normal saline cleanse with  medihoney and DSD daily to the left plantar foot ulceration  Recommend continue decubitus precautions and Z flow offloading boots  We will continue to follow to monitor for signs of infection and wound care recommendations

## 2022-04-06 NOTE — PROGRESS NOTE ADULT - SUBJECTIVE AND OBJECTIVE BOX
[facility]   [phone]  RENAL FOLLOW UP NOTE  --------------------------------------------------------------------------------  Chief Complaint:    24 hour events/subjective:    Patient seen and examined with Dr. Art elizabeth today    PAST HISTORY  --------------------------------------------------------------------------------  No significant changes to PMH, PSH, FHx, SHx, unless otherwise noted    ALLERGIES & MEDICATIONS  --------------------------------------------------------------------------------  Allergies    nitrofurantoin (Nephrotoxicity)    Intolerances      Standing Inpatient Medications  allopurinol 100 milliGRAM(s) Oral daily  aspirin enteric coated 81 milliGRAM(s) Oral daily  atorvastatin 40 milliGRAM(s) Oral at bedtime  chlorhexidine 2% Cloths 1 Application(s) Topical daily  dextrose 5%. 1000 milliLiter(s) IV Continuous <Continuous>  dextrose 5%. 1000 milliLiter(s) IV Continuous <Continuous>  dextrose 50% Injectable 25 Gram(s) IV Push once  dextrose 50% Injectable 12.5 Gram(s) IV Push once  dextrose 50% Injectable 25 Gram(s) IV Push once  glucagon  Injectable 1 milliGRAM(s) IntraMuscular once  heparin  Infusion.  Unit(s)/Hr IV Continuous <Continuous>  insulin lispro (ADMELOG) corrective regimen sliding scale   SubCutaneous three times a day before meals  insulin lispro (ADMELOG) corrective regimen sliding scale   SubCutaneous at bedtime  metoprolol tartrate Injectable 5 milliGRAM(s) IV Push every 6 hours  sevelamer carbonate 800 milliGRAM(s) Oral three times a day with meals    PRN Inpatient Medications  dextrose Oral Gel 15 Gram(s) Oral once PRN  heparin   Injectable 85573 Unit(s) IV Push every 6 hours PRN  heparin   Injectable 5000 Unit(s) IV Push every 6 hours PRN      REVIEW OF SYSTEMS  --------------------------------------------------------------------------------    Gen: denies fevers/chills,  CVS: denies chest pain/palpitations  Resp: denies SOB/Cough  GI: Denies N/V/Abd pain  : Denies dysuria    VITALS/PHYSICAL EXAM  --------------------------------------------------------------------------------  T(C): 36.7 (04-06-22 @ 04:45), Max: 37.4 (04-05-22 @ 12:04)  HR: 100 (04-06-22 @ 04:45) (85 - 107)  BP: 155/86 (04-06-22 @ 04:45) (130/60 - 172/68)  RR: 20 (04-06-22 @ 04:45) (18 - 20)  SpO2: 95% (04-06-22 @ 04:45) (93% - 96%)  Wt(kg): --        04-05-22 @ 07:01  -  04-06-22 @ 07:00  --------------------------------------------------------  IN: 0 mL / OUT: 500 mL / NET: -500 mL      Physical Exam:  	  	Gen: Alert today, oriented x 2-3, on O2    	Pulm: Decreased breath sounds b/l bases. +crackles. no ronchi or wheezing  	CV: No JVD. IRR, tachy   	Abd: +BS, soft, nontender, softly distended, obese   	: No suprapubic tenderness.               Extremity UE: increased warmth, swollen fingers B/L              Extremity LE: + hyperpigmented bilateral LE with B/L LE erythema and increased warmth, non-pitting edema              Vascular: R groin HD cath    LABS/STUDIES  --------------------------------------------------------------------------------              11.5   8.48  >-----------<  171      [04-06-22 @ 03:27]              37.3     136  |  95  |  81  ----------------------------<  151      [04-06-22 @ 03:27]  4.7   |  21  |  6.85        Ca     8.6     [04-06-22 @ 03:27]      Mg     2.6     [04-06-22 @ 03:27]      Phos  8.8     [04-06-22 @ 03:27]      PT/INR: PT 21.1 , INR 1.81       [04-05-22 @ 10:20]  PTT: 65.4       [04-06-22 @ 03:27]      Creatinine Trend:  SCr 6.85 [04-06 @ 03:27]  SCr 7.64 [04-05 @ 00:18]  SCr 8.51 [04-04 @ 06:27]  SCr 8.29 [04-03 @ 06:58]  SCr 7.94 [04-03 @ 05:30]              Urinalysis - [04-03-22 @ 00:48]      Color Light Orange / Appearance Turbid / SG 1.021 / pH 5.5      Gluc Negative / Ketone Negative  / Bili Negative / Urobili Negative       Blood Large / Protein 300 mg/dL / Leuk Est Large / Nitrite Negative      RBC 20 /  / Hyaline 10 / Gran  / Sq Epi  / Non Sq Epi 3 / Bacteria Negative    Urine Creatinine 153      [04-03-22 @ 00:48]  Urine Protein 71      [04-03-22 @ 00:48]  Urine Sodium 11      [04-03-22 @ 00:48]  Urine Chloride <20      [04-03-22 @ 00:48]    Iron 31, TIBC 243, %sat 13      [04-04-22 @ 00:17]  Ferritin 152      [04-03-22 @ 23:06]  PTH -- (Ca --)      [04-03-22 @ 23:06]   97  HbA1c 11.7      [11-07-19 @ 09:14]  TSH 1.98      [04-05-22 @ 05:19]

## 2022-04-06 NOTE — PROGRESS NOTE ADULT - PROBLEM SELECTOR PLAN 3
Presents with dark urine with hematuria, decreased UO  -CT A/P with no hydronephrosis, nephrolithiasis, or obstruction  -UC negative  -S/p ABX per ID.

## 2022-04-06 NOTE — PROGRESS NOTE ADULT - SUBJECTIVE AND OBJECTIVE BOX
[facility], Division of Infectious Diseases  ADRIANA Seals Y. Patel, S. Shah, [facility]  [phone]    Name: DYLAN DEL CASTILLO  Age: 67y  Gender: Male  MRN: [id number]    Interval History:  Patient seen and examined at bedside  No acute overnight events. Afebrile  More alert today  Notes reviewed    Antibiotics:      Medications:  allopurinol 100 milliGRAM(s) Oral daily  aspirin enteric coated 81 milliGRAM(s) Oral daily  atorvastatin 40 milliGRAM(s) Oral at bedtime  chlorhexidine 2% Cloths 1 Application(s) Topical daily  dextrose 5%. 1000 milliLiter(s) IV Continuous <Continuous>  dextrose 5%. 1000 milliLiter(s) IV Continuous <Continuous>  dextrose 50% Injectable 25 Gram(s) IV Push once  dextrose 50% Injectable 12.5 Gram(s) IV Push once  dextrose 50% Injectable 25 Gram(s) IV Push once  dextrose Oral Gel 15 Gram(s) Oral once PRN  diltiazem Infusion 5 mG/Hr IV Continuous <Continuous>  glucagon  Injectable 1 milliGRAM(s) IntraMuscular once  heparin   Injectable 93712 Unit(s) IV Push every 6 hours PRN  heparin   Injectable 5000 Unit(s) IV Push every 6 hours PRN  heparin  Infusion.  Unit(s)/Hr IV Continuous <Continuous>  hydrALAZINE Injectable 10 milliGRAM(s) IV Push three times a day PRN  insulin lispro (ADMELOG) corrective regimen sliding scale   SubCutaneous three times a day before meals  insulin lispro (ADMELOG) corrective regimen sliding scale   SubCutaneous at bedtime  metoprolol tartrate Injectable 7.5 milliGRAM(s) IV Push every 6 hours  sevelamer carbonate 800 milliGRAM(s) Oral three times a day with meals      Review of Systems:  unable to obtain    Allergies: nitrofurantoin (Nephrotoxicity)    For details regarding the patient's past medical history, social history, family history, and other miscellaneous elements, please refer the initial infectious diseases consultation and/or the admitting history and physical examination for this admission.    Objective:  Vitals:   T(C): 36.9 (04-06-22 @ 13:02), Max: 36.9 (04-06-22 @ 13:02)  HR: 116 (04-06-22 @ 13:02) (98 - 125)  BP: 168/78 (04-06-22 @ 13:30) (130/60 - 192/93)  RR: 20 (04-06-22 @ 13:02) (18 - 20)  SpO2: 94% (04-06-22 @ 13:02) (93% - 96%)    Physical Examination:  General: no acute distress  HEENT: NC/AT, EOMI  Cardio: S1, S2 heard, RRR, no murmurs  Resp: breath sounds heard bilaterally, no rales, wheezes or rhonchi  Abd: soft, NT, ND  Ext: no edema or cyanosis  Skin: warm, dry, no visible rash      Laboratory Studies:  CBC:                       11.5   8.48  )-----------( 171      ( 06 Apr 2022 03:27 )             37.3     CMP: 04-06    136  |  95<L>  |  81<H>  ----------------------------<  151<H>  4.7   |  21<L>  |  6.85<H>    Ca    8.6      06 Apr 2022 03:27  Phos  8.8     04-06  Mg     2.6     04-06            Microbiology: reviewed    Culture - Blood (collected 04-04-22 @ 17:46)  Source: .Blood Blood-Peripheral  Preliminary Report (04-05-22 @ 18:00):    No growth to date.    Culture - Blood (collected 04-04-22 @ 17:46)  Source: .Blood Blood-Peripheral  Preliminary Report (04-05-22 @ 18:00):    No growth to date.    Culture - Urine (collected 04-03-22 @ 04:11)  Source: Clean Catch Clean Catch (Midstream)  Final Report (04-04-22 @ 12:00):    <10,000 CFU/mL Normal Urogenital Tiffany        Radiology: reviewed

## 2022-04-06 NOTE — PROGRESS NOTE ADULT - NS ATTEND AMEND GEN_ALL_CORE FT
Seen, examined with, formulated plan with and  agree with above as scribed by NP Olesya [Colten]       alert when seen earlier albeit intermittent confused as per staff at bedside  heart tach   lungs decrease bs  + chronic stasis changes    ANT requiring hd   hd in am   low k diet   IR to change hd cath

## 2022-04-06 NOTE — PROGRESS NOTE ADULT - ASSESSMENT
A/P    66 y/o M with history of CAD, s/p multiple PCI, with most recent 2/22 PCI of LAD in setting of NSTEMI, on ASA only (pradaxa), chronic atrial fibrillation maintained on Pradaxa, essential HTN, type 2 DM previously on oral medications but on insulin, diabetic neuropathy with chronic RIGHT LE venous stasis changes>left, LEFT foot ulcer seen by podiatry, past endarterectomy LEFT CEA in 2014 presenting with 1 week of decreased urine output, cystitis with hematuria     #ANT on CKD  -oliguric renal failure in setting of ? UTI/cystitis, r/o obstruction  -? secondary to abx   -worsened with diuretic use but unlikely due to hypovolemia alone from diuretic use   -r/o bacteremia, r/o other infection ?leg cellulitis  - UA+ , Ucx noted -iv abx per id , BCX NGTD   -hyperkalemia improved   -New HD for uremia, renal failure  -hopefully HD only temporary  -renal f/u     #AMS/Lethargy  -slowly improving   -likely 2/2 Uremic encephalopathy   -ABG noted  -med f/u   -MICU eval noted     #Acute on chronic HFpEF  -remains clinically overloaded, hypervolemic  -but not decompensated  -CT chest noted   -volume removal with HD  -bumex per renal   -not tolerating PO - c/w IVP bb     #Chronic AF  -rates elevated today   -not tolerating PO - inc IVP BB to 7.5 mg Q6H   -can give IVP Cardizem PRN vs Cardizem gtt if rates remain elevated   -c/w hep gtt     #HTN  -sbp elevated  -pt not tolerating PO meds  -start IV hydral 10 mg PRN for sbp >160     #CAD, s/p PCI, most recent 2/2022  -stable, cont ASA, off plavix due to pradaxa indication  -statin as able     #R carotid stenosis  -cont med tx  -MRA noted with Greater than 75% stenosis of the right distal common carotid artery. Approximately 60% stenosis of the proximal left internal carotid artery.  -Continue ASA and Statin Therapy  -vasc outpt f/u Dr Pinto    ACP- Advanced Care Planning  -Advanced care planning discussed with patient. Advanced care planning forms discussed with patient and/or family.  Risks, benefits, and alternatives of medical/cardiac procedures were discussed in detail with all questions answered.  30 minutes were spent addressing advance care planning.        plan discussed with ACP and family at bedside  A/P    66 y/o M with history of CAD, s/p multiple PCI, with most recent 2/22 PCI of LAD in setting of NSTEMI, on ASA only (pradaxa), chronic atrial fibrillation maintained on Pradaxa, essential HTN, type 2 DM previously on oral medications but on insulin, diabetic neuropathy with chronic RIGHT LE venous stasis changes>left, LEFT foot ulcer seen by podiatry, past endarterectomy LEFT CEA in 2014 presenting with 1 week of decreased urine output, cystitis with hematuria     #ANT on CKD  -oliguric renal failure in setting of ? UTI/cystitis, r/o obstruction  -? secondary to abx   -worsened with diuretic use but unlikely due to hypovolemia alone from diuretic use   -r/o bacteremia, r/o other infection ?leg cellulitis  - UA+ , Ucx noted -iv abx per id , BCX NGTD   -hyperkalemia improved   -New HD for uremia, renal failure  -hopefully HD only temporary  -renal f/u     #AMS/Lethargy  -slowly improving   -likely 2/2 Uremic encephalopathy   -ABG noted  -med f/u   -MICU eval noted     #Acute on chronic HFpEF  -remains clinically overloaded, hypervolemic  -but not decompensated  -CT chest noted   -volume removal with HD  -bumex per renal   -not tolerating PO - c/w IVP bb     #Chronic AF  -rates elevated today   -not tolerating PO - inc IVP BB to 7.5 mg Q6H   -sbp elevated - start cardizem gtt at 10 mg / hr   -c/w hep gtt     #HTN  -sbp elevated  -pt not tolerating PO meds  -start IV hydral 10 mg  q6h     #CAD, s/p PCI, most recent 2/2022  -stable, cont ASA, off plavix due to pradaxa indication  -statin as able     #R carotid stenosis  -cont med tx  -MRA noted with Greater than 75% stenosis of the right distal common carotid artery. Approximately 60% stenosis of the proximal left internal carotid artery.  -Continue ASA and Statin Therapy  -vasc outpt f/u Dr Pinto    ACP- Advanced Care Planning  -Advanced care planning discussed with patient. Advanced care planning forms discussed with patient and/or family.  Risks, benefits, and alternatives of medical/cardiac procedures were discussed in detail with all questions answered.  30 minutes were spent addressing advance care planning.        plan discussed with ACP and family at bedside

## 2022-04-06 NOTE — PROGRESS NOTE ADULT - SUBJECTIVE AND OBJECTIVE BOX
CARDIOLOGY FOLLOW UP - Dr. Mazariegos  Date of Service: 4/6/22  CC: more alert today, however remains confused  elevated bp noted  wife at bedside     Review of Systems:  DEVANG      PHYSICAL EXAM:  T(C): 36.7 (04-06-22 @ 08:45), Max: 36.8 (04-05-22 @ 17:53)  HR: 125 (04-06-22 @ 08:45) (98 - 125)  BP: 192/93 (04-06-22 @ 08:45) (130/60 - 192/93)  RR: 20 (04-06-22 @ 08:45) (18 - 20)  SpO2: 94% (04-06-22 @ 08:45) (93% - 96%)  Wt(kg): --  I&O's Summary    05 Apr 2022 07:01  -  06 Apr 2022 07:00  --------------------------------------------------------  IN: 0 mL / OUT: 500 mL / NET: -500 mL        Appearance: NAD 	  Cardiovascular: irreg , No JVD, No murmurs  Respiratory: Lungs clear to auscultation	  Gastrointestinal:  Soft, Non-tender, + BS	  Extremities: Normal range of motion, No clubbing, cyanosis + bl le edema      Home Medications:  allopurinol 100 mg oral tablet: 1 tab(s) orally once a day (03 Apr 2022 06:34)  aspirin 81 mg oral delayed release tablet: 1 tab(s) orally once a day (03 Apr 2022 06:34)  bumetanide 2 mg oral tablet: 1 tab(s) orally once a day (03 Apr 2022 06:34)  insulin glargine 100 units/mL subcutaneous solution: 25 unit(s) subcutaneous once a day (at bedtime) (03 Apr 2022 06:34)  metOLazone 5 mg oral tablet: 1 tab(s) orally 3 times a week (03 Apr 2022 06:34)  metoprolol succinate 50 mg oral tablet, extended release: 2 tab(s) orally once a day (03 Apr 2022 06:34)  NovoLOG 100 units/mL subcutaneous solution: subcutaneous 4 times a day (before meals and at bedtime) (03 Apr 2022 06:34)  NovoLOG FlexPen 100 units/mL injectable solution: 10 unit(s) subcutaneous 3 times a day (03 Apr 2022 06:34)  oxycodone-acetaminophen 5 mg-325 mg oral tablet: 1 tab(s) orally 2 times a day (03 Apr 2022 06:34)  Pradaxa 150 mg oral capsule: 1 cap(s) orally 2 times a day (03 Apr 2022 06:34)  pregabalin 100 mg oral capsule: 1 cap(s) orally 3 times a day (03 Apr 2022 06:34)      MEDICATIONS  (STANDING):  allopurinol 100 milliGRAM(s) Oral daily  aspirin enteric coated 81 milliGRAM(s) Oral daily  atorvastatin 40 milliGRAM(s) Oral at bedtime  chlorhexidine 2% Cloths 1 Application(s) Topical daily  dextrose 5%. 1000 milliLiter(s) (100 mL/Hr) IV Continuous <Continuous>  dextrose 5%. 1000 milliLiter(s) (50 mL/Hr) IV Continuous <Continuous>  dextrose 50% Injectable 25 Gram(s) IV Push once  dextrose 50% Injectable 12.5 Gram(s) IV Push once  dextrose 50% Injectable 25 Gram(s) IV Push once  diltiazem Injectable 5 milliGRAM(s) IV Push once  glucagon  Injectable 1 milliGRAM(s) IntraMuscular once  heparin  Infusion.  Unit(s)/Hr (24 mL/Hr) IV Continuous <Continuous>  insulin lispro (ADMELOG) corrective regimen sliding scale   SubCutaneous three times a day before meals  insulin lispro (ADMELOG) corrective regimen sliding scale   SubCutaneous at bedtime  metoprolol tartrate Injectable 2.5 milliGRAM(s) IV Push once  metoprolol tartrate Injectable 7.5 milliGRAM(s) IV Push every 6 hours  sevelamer carbonate 800 milliGRAM(s) Oral three times a day with meals      TELEMETRY: afib 90-120s 	    ECG:  	  RADIOLOGY:   DIAGNOSTIC TESTING:  [ ] Echocardiogram:  [ ]  Catheterization:  [ ] Stress Test:    OTHER: 	    LABS:	 	                            11.5   8.48  )-----------( 171      ( 06 Apr 2022 03:27 )             37.3     04-06    136  |  95<L>  |  81<H>  ----------------------------<  151<H>  4.7   |  21<L>  |  6.85<H>    Ca    8.6      06 Apr 2022 03:27  Phos  8.8     04-06  Mg     2.6     04-06      PT/INR - ( 05 Apr 2022 10:20 )   PT: 21.1 sec;   INR: 1.81 ratio         PTT - ( 06 Apr 2022 03:27 )  PTT:65.4 sec

## 2022-04-06 NOTE — PROGRESS NOTE ADULT - PROBLEM SELECTOR PLAN 1
Likely 2nd to b/l pl effusions, atelectasis  -Suspect fluid overload given elevated proBNP, LE edema on admission   -Keep O>I with HD as tolerated   -Pt with hx of hydroPTX. CT chest in 2/2022 with pleural thickening, atelectasis. Findings at the time suspected to be chronic. CT A/P 4/2 with small b/l pl effusions L>R, pleural thickening  -CT chest now with small b/l pl effusion R>L, bibasilar atelectasis  -Keep sats >90% with supplemental O2 as needed (currently 2LNC).

## 2022-04-06 NOTE — PROGRESS NOTE ADULT - SUBJECTIVE AND OBJECTIVE BOX
Podiatry pager #: [phone]/ [phone]    Patient is a 67y old  Male who presents with a chief complaint of Decreased urine output for the past week, leg oedema (06 Apr 2022 09:44) Podiatry consult for diabetic chronic left foot ulceration.  Patient known to office for condition      HPI:  NIGHT HOSPITALIST:   Patient remotely known to me from an admission to [facility] in Feb 2017--assigned to me at this point by the ER to admit this 68 y/o M--followed by his nephrologist above--patient with a history of CAD with past multiple PCI, with most recent discharge from [facility] in Feb 2022 for non ST elevation MI with LULU of an 85% prox LAD lesion with patient on ASA and now off Plavix per cardiology (only for one month), chronic atrial fibrillation maintained on Pradaxa, essential HTN, type 2 DM previously on oral medications but on insulin, diabetic neuropathy with chronic RIGHT LE venous stasis changes>left, LEFT foot ulcer seen by podiatry, past endarterectomy LEFT CEA in 2014. with patient self referring to the ER following apparently treatment by an urgent care center for an apparent cystitis with hematuria on nitrofurantoin but with patient noting decreased urine output for the past week, and difficulty with B/L coarse tremors for several weeks, with patient having difficulty negotiating his applications on his smart phone (observed by examiner).  Patient with increasing B/L LE oedema and weight gain, with patient with 2 pillow orthopnoea.      Patient was recommended for a [place] but patient refused, patient wishing to review the issue with his daughter.    Patient with past history of COVID-19 in 12/2021 and has received the COVID-19 vaccine x 2.    NO fever, no chills, no rigors.  No diaphoresis.  No back pain, no tearing back pain. (03 Apr 2022 06:29)      PAST MEDICAL & SURGICAL HISTORY:  HTN (hypertension), benign    HLD (hyperlipidemia)    DM type 2 (diabetes mellitus, type 2)    TIA (transient ischemic attack)    Atrial fibrillation    MI (myocardial infarction)  circa 2014 and 2022.    CAD (coronary artery disease)    Neuropathy    Stage 3 chronic kidney disease    2019 novel coronavirus disease (COVID-19)  12/2021.  Received the COVID-19 vaccine x 2 as of April 2022.    Status post angioplasty with stent  LULU x 3 2/7/2014    S/P carotid endarterectomy  left        MEDICATIONS  (STANDING):  allopurinol 100 milliGRAM(s) Oral daily  aspirin enteric coated 81 milliGRAM(s) Oral daily  atorvastatin 40 milliGRAM(s) Oral at bedtime  chlorhexidine 2% Cloths 1 Application(s) Topical daily  dextrose 5%. 1000 milliLiter(s) (100 mL/Hr) IV Continuous <Continuous>  dextrose 5%. 1000 milliLiter(s) (50 mL/Hr) IV Continuous <Continuous>  dextrose 50% Injectable 25 Gram(s) IV Push once  dextrose 50% Injectable 12.5 Gram(s) IV Push once  dextrose 50% Injectable 25 Gram(s) IV Push once  glucagon  Injectable 1 milliGRAM(s) IntraMuscular once  heparin  Infusion.  Unit(s)/Hr (24 mL/Hr) IV Continuous <Continuous>  insulin lispro (ADMELOG) corrective regimen sliding scale   SubCutaneous three times a day before meals  insulin lispro (ADMELOG) corrective regimen sliding scale   SubCutaneous at bedtime  metoprolol tartrate Injectable 5 milliGRAM(s) IV Push every 6 hours  sevelamer carbonate 800 milliGRAM(s) Oral three times a day with meals    MEDICATIONS  (PRN):  dextrose Oral Gel 15 Gram(s) Oral once PRN Blood Glucose LESS THAN 70 milliGRAM(s)/deciliter  heparin   Injectable 25896 Unit(s) IV Push every 6 hours PRN For aPTT less than 40  heparin   Injectable 5000 Unit(s) IV Push every 6 hours PRN For aPTT between 40 - 57      Allergies    nitrofurantoin (Nephrotoxicity)    Intolerances        VITALS:    Vital Signs Last 24 Hrs  T(C): 36.7 (06 Apr 2022 04:45), Max: 37.4 (05 Apr 2022 12:04)  T(F): 98 (06 Apr 2022 04:45), Max: 99.3 (05 Apr 2022 12:04)  HR: 100 (06 Apr 2022 04:45) (85 - 107)  BP: 155/86 (06 Apr 2022 04:45) (130/60 - 172/68)  BP(mean): --  RR: 20 (06 Apr 2022 04:45) (18 - 20)  SpO2: 95% (06 Apr 2022 04:45) (93% - 96%)    LABS:                          11.5   8.48  )-----------( 171      ( 06 Apr 2022 03:27 )             37.3       04-06    136  |  95<L>  |  81<H>  ----------------------------<  151<H>  4.7   |  21<L>  |  6.85<H>    Ca    8.6      06 Apr 2022 03:27  Phos  8.8     04-06  Mg     2.6     04-06        CAPILLARY BLOOD GLUCOSE      POCT Blood Glucose.: 184 mg/dL (06 Apr 2022 08:37)  POCT Blood Glucose.: 149 mg/dL (05 Apr 2022 21:16)  POCT Blood Glucose.: 140 mg/dL (05 Apr 2022 16:57)  POCT Blood Glucose.: 168 mg/dL (05 Apr 2022 11:31)      PT/INR - ( 05 Apr 2022 10:20 )   PT: 21.1 sec;   INR: 1.81 ratio         PTT - ( 06 Apr 2022 03:27 )  PTT:65.4 sec    LOWER EXTREMITY PHYSICAL EXAM:    Vasular: DP/PT 1_/4, B/L, CFT <_3  seconds B/L, Temperature gradient WNL, B/L. Chronic venous stasis bilateral lower extremities, pitting edema bilateral lower external knees  Neuro: Epicritic sensation Diminished_ to the level of Toes_, B/L.  Wound #1:   Location: Left plantar foot  Size:2 cm diameter by 1.1 cm diameter  Depth:0.2 cm  Wound bed: Hyperkeratosis with fibrotic tissue  Drainage: None, Nonfluctuant  Odor: None  Periwound:No clinical signs of cellulitis  Etiology: Present on admission/diabetes    RADIOLOGY & ADDITIONAL STUDIES:

## 2022-04-06 NOTE — PROGRESS NOTE ADULT - ASSESSMENT
67 year old gentleman with PMH of CAD, NSTEMI s/p 3 stents in 2014 (stents to LAD, proximal and distal LCx), ischemic cardiomyopathy, HTN for 10 years, T2DM for 26 years c/b peripheral neuropathy, CVA, TIA, Afib on Pradaxa, chronic RLE wound, L carotid stenosis s/p endarterectomy (2014) just recently admitted  to the [facility] on 2/19/2022 with acute onset chest pain for one day found to have an NSTEMI, CAD, s/p PCI in setting of increased AF rates off bb for 3 days and resolved chest pain, his CKMB peaked and Echo noted with EF 60%,normal LV function, mild TR, mild NY. He was s/p [facility] with 85% proximal LAD s/p balloon angioplasty and LULU. He presented to [facility] ED with decreased urine output over the week. Mr. Stevens went to [facility] for hematuria and was started  on Nitrofurantoin. Pt is still taking Nitrofurantoin w/ 5 days left. He came to the ED due to worsening symptoms. Pt reports having a similar incident 4-5 years ago that resolved spontaneously. He reports increased leg edema Dyspnea worse on exertion. his baseline Serum creatinine is in the 2.0 to 2.3 mg/dl range. ANT with serum creatinine of 8.29 with bun 144 and k 5.5        1- ANT on ckd III   2- chf chronic   3- hyperkalemia   4- uremia  5- hyperphosphatemia       Initiation of Urgent HD, given he is uremic  etiology of ANT ? infection and +/- volume depletion  No HD today  tachycardic, transition to po metoprolol now that he is alert  no hydro on ct abd  +U/A  blood cx NGTD  ceftriaxone 1 G daily - completed  renvela one tab with meals  strict I/O  trend labs daily  IR consulted to change groin HD cath to temporary IJ.

## 2022-04-06 NOTE — PROGRESS NOTE ADULT - SUBJECTIVE AND OBJECTIVE BOX
DATE OF SERVICE: 04-06-22 @ 09:44  CHIEF COMPLAINT:Patient is a 67y old  Male who presents with a chief complaint of Decreased urine output for the past week, leg oedema (06 Apr 2022 08:45)    	        PAST MEDICAL & SURGICAL HISTORY:  HTN (hypertension), benign    HLD (hyperlipidemia)    DM type 2 (diabetes mellitus, type 2)    TIA (transient ischemic attack)    Atrial fibrillation    MI (myocardial infarction)  circa 2014 and 2022.    CAD (coronary artery disease)    Neuropathy    Stage 3 chronic kidney disease    2019 novel coronavirus disease (COVID-19)  12/2021.  Received the COVID-19 vaccine x 2 as of April 2022.    Status post angioplasty with stent  LULU x 3 2/7/2014    S/P carotid endarterectomy  left          feels better  no cp or sob  less pain  no vomiting      Medications:  MEDICATIONS  (STANDING):  allopurinol 100 milliGRAM(s) Oral daily  aspirin enteric coated 81 milliGRAM(s) Oral daily  atorvastatin 40 milliGRAM(s) Oral at bedtime  chlorhexidine 2% Cloths 1 Application(s) Topical daily  dextrose 5%. 1000 milliLiter(s) (100 mL/Hr) IV Continuous <Continuous>  dextrose 5%. 1000 milliLiter(s) (50 mL/Hr) IV Continuous <Continuous>  dextrose 50% Injectable 25 Gram(s) IV Push once  dextrose 50% Injectable 12.5 Gram(s) IV Push once  dextrose 50% Injectable 25 Gram(s) IV Push once  glucagon  Injectable 1 milliGRAM(s) IntraMuscular once  heparin  Infusion.  Unit(s)/Hr (24 mL/Hr) IV Continuous <Continuous>  insulin lispro (ADMELOG) corrective regimen sliding scale   SubCutaneous three times a day before meals  insulin lispro (ADMELOG) corrective regimen sliding scale   SubCutaneous at bedtime  metoprolol tartrate Injectable 5 milliGRAM(s) IV Push every 6 hours  sevelamer carbonate 800 milliGRAM(s) Oral three times a day with meals    MEDICATIONS  (PRN):  dextrose Oral Gel 15 Gram(s) Oral once PRN Blood Glucose LESS THAN 70 milliGRAM(s)/deciliter  heparin   Injectable 53309 Unit(s) IV Push every 6 hours PRN For aPTT less than 40  heparin   Injectable 5000 Unit(s) IV Push every 6 hours PRN For aPTT between 40 - 57    	    PHYSICAL EXAM:  T(C): 36.7 (04-06-22 @ 04:45), Max: 37.4 (04-05-22 @ 12:04)  HR: 100 (04-06-22 @ 04:45) (85 - 107)  BP: 155/86 (04-06-22 @ 04:45) (130/60 - 172/68)  RR: 20 (04-06-22 @ 04:45) (18 - 20)  SpO2: 95% (04-06-22 @ 04:45) (93% - 96%)  Wt(kg): --  I&O's Summary    05 Apr 2022 07:01  -  06 Apr 2022 07:00  --------------------------------------------------------  IN: 0 mL / OUT: 500 mL / NET: -500 mL        Appearance: Normal	  HEENT:   Normal oral mucosa, PERRL, EOMI	  Lymphatic: No lymphadenopathy  Cardiovascular: Normal S1 S2, No JVD,  Respiratory: dec bs   Gastrointestinal:  Soft, Non-tender, + BS	  	  Neurologic: Non-focal  Extremities: dec rom    TELEMETRY: 	    ECG:  	  RADIOLOGY:  OTHER: 	  	  LABS:	 	    CARDIAC MARKERS:                                11.5   8.48  )-----------( 171      ( 06 Apr 2022 03:27 )             37.3     04-06    136  |  95<L>  |  81<H>  ----------------------------<  151<H>  4.7   |  21<L>  |  6.85<H>    Ca    8.6      06 Apr 2022 03:27  Phos  8.8     04-06  Mg     2.6     04-06      proBNP:   Lipid Profile:   HgA1c:   TSH:

## 2022-04-06 NOTE — PROGRESS NOTE ADULT - PROBLEM SELECTOR PLAN 5
-S/p MICU evaluation 4/5 for lethargy, now confused/agitated   -ABG with no CO2 retention, sedating medications held  -? if 2nd to uremia, HD per renal.

## 2022-04-06 NOTE — PROGRESS NOTE ADULT - ASSESSMENT
Patient is a 67 year old male with PMH of CAD with past multiple PCI, with most recent discharge from [facility] in Feb 2022 for NSTEMI with LULU of an 85% prox LAD lesion with patient on ASA and now off Plavix per cardiology (only for one month), chronic afib on Pradaxa, HTN, type 2 DM on insulin, diabetic neuropathy with chronic RIGHT LE venous stasis changes>left, LEFT foot ulcer seen by podiatry, past endarterectomy LEFT CEA in 2014 who presented with UTI with hematuria diagnosed at urgent care and now with decreased urine output.     Acute cystitis/UTI    - noted dark urine with hematuria, went to  and was prescribed macrobid, had some improvement in urine color but then noticed decreased urine output with no voiding reported in last 2 days   - UA here with pyuria -  with large LE, negative nitrites and no bacteria    - CTAP reviewed - no hydronephrosis, nephrolithiasis, or evidence of obstructive uropathy   - urine culture negative   - blood cultures negative   - continue on ceftriaxone, plan x7 day course until 4/9/22   - monitor temps/WBC    ANT on CKD3   - Nephrology following, appreciate recs   - s/p placement of shiley and initiation of HD   - monitor Cr    - renally dose meds     B/l LE edema with chronic venous stasis  Acute on chronic HFpEF   - legs cool to touch, nontender, chronic changes with no sign of infection/cellulitis   - has wound on bottom of L foot - dry and does not appear infected either   - clinically overloaded, Cardiology and Nephrology following, on IV bumex, for HD   - elevate lower extremities      DM2 with neuropathy   - Blood glucose management per primary team.    Infectious Diseases will continue to follow. Please call with any questions.   Annie King M.D.  [facility], Division of Infectious Diseases [phone]

## 2022-04-06 NOTE — PROGRESS NOTE ADULT - SUBJECTIVE AND OBJECTIVE BOX
Follow-up Pulm Progress Note    Confused, agitated, crying out despite wife at bedside  O2 sats 96% on 2LNC    Medications:  MEDICATIONS  (STANDING):  allopurinol 100 milliGRAM(s) Oral daily  aspirin enteric coated 81 milliGRAM(s) Oral daily  atorvastatin 40 milliGRAM(s) Oral at bedtime  chlorhexidine 2% Cloths 1 Application(s) Topical daily  dextrose 5%. 1000 milliLiter(s) (100 mL/Hr) IV Continuous <Continuous>  dextrose 5%. 1000 milliLiter(s) (50 mL/Hr) IV Continuous <Continuous>  dextrose 50% Injectable 25 Gram(s) IV Push once  dextrose 50% Injectable 12.5 Gram(s) IV Push once  dextrose 50% Injectable 25 Gram(s) IV Push once  glucagon  Injectable 1 milliGRAM(s) IntraMuscular once  heparin  Infusion.  Unit(s)/Hr (24 mL/Hr) IV Continuous <Continuous>  insulin lispro (ADMELOG) corrective regimen sliding scale   SubCutaneous three times a day before meals  insulin lispro (ADMELOG) corrective regimen sliding scale   SubCutaneous at bedtime  metoprolol tartrate Injectable 7.5 milliGRAM(s) IV Push every 6 hours  sevelamer carbonate 800 milliGRAM(s) Oral three times a day with meals    MEDICATIONS  (PRN):  dextrose Oral Gel 15 Gram(s) Oral once PRN Blood Glucose LESS THAN 70 milliGRAM(s)/deciliter  heparin   Injectable 23901 Unit(s) IV Push every 6 hours PRN For aPTT less than 40  heparin   Injectable 5000 Unit(s) IV Push every 6 hours PRN For aPTT between 40 - 57  hydrALAZINE Injectable 10 milliGRAM(s) IV Push three times a day PRN FOR SBP >160    Vital Signs Last 24 Hrs  T(C): 36.9 (06 Apr 2022 13:02), Max: 36.9 (06 Apr 2022 13:02)  T(F): 98.5 (06 Apr 2022 13:02), Max: 98.5 (06 Apr 2022 13:02)  HR: 116 (06 Apr 2022 13:02) (98 - 125)  BP: 168/78 (06 Apr 2022 13:30) (130/60 - 192/93)  BP(mean): --  RR: 20 (06 Apr 2022 13:02) (18 - 20)  SpO2: 94% (06 Apr 2022 13:02) (93% - 96%)    ABG - ( 05 Apr 2022 00:12 )  pH, Arterial: 7.29  pH, Blood: x     /  pCO2: 46    /  pO2: 98    / HCO3: 22    / Base Excess: -4.5  /  SaO2: 98.4        04-05 @ 07:01  -  04-06 @ 07:00  --------------------------------------------------------  IN: 0 mL / OUT: 500 mL / NET: -500 mL    LABS:                        11.5   8.48  )-----------( 171      ( 06 Apr 2022 03:27 )             37.3     04-06    136  |  95<L>  |  81<H>  ----------------------------<  151<H>  4.7   |  21<L>  |  6.85<H>    Ca    8.6      06 Apr 2022 03:27  Phos  8.8     04-06  Mg     2.6     04-06    CAPILLARY BLOOD GLUCOSE  POCT Blood Glucose.: 232 mg/dL (06 Apr 2022 12:49)    PT/INR - ( 05 Apr 2022 10:20 )   PT: 21.1 sec;   INR: 1.81 ratio      PTT - ( 06 Apr 2022 03:27 )  PTT:65.4 sec    Serum Pro-Brain Natriuretic Peptide: 47818 pg/mL (04-03-22 @ 18:56)      CULTURES:    Culture - Blood (collected 04-04-22 @ 17:46)  Source: .Blood Blood-Peripheral  Preliminary Report (04-05-22 @ 18:00):    No growth to date.    Culture - Blood (collected 04-04-22 @ 17:46)  Source: .Blood Blood-Peripheral  Preliminary Report (04-05-22 @ 18:00):    No growth to date.    Culture - Urine (collected 04-03-22 @ 04:11)  Source: Clean Catch Clean Catch (Midstream)  Final Report (04-04-22 @ 12:00):    <10,000 CFU/mL Normal Urogenital Tiffany    Physical Examination:  PULM: Decreased at bases   CVS: RRR    RADIOLOGY REVIEWED    CT chest: < from: CT Chest No Cont (04.04.22 @ 16:52) >  FINDINGS:    AIRWAYS, LUNGS, PLEURA: Tracheal secretions. Small left greater than   right pleural effusions increased compared to 2/19/2022. Right-sided   pleural thickening. Lingular and left greater than right basilar   opacification, likely atelectasis.    MEDIASTINUM: Cardiomegaly. No pericardial effusion. Thoracic aorta normal   caliber.  No large mediastinal lymph nodes.    IMAGED ABDOMEN: Nonspecific perinephric stranding.    SOFT TISSUES: Unremarkable.    BONES: Unremarkable.      IMPRESSION:.    Small left greater than right bilateral pleural effusions increased in   size compared to 2/19/2022.    Lingular and left greater than right basilar opacification, likely   atelectasis.    --- End of Report ---      < end of copied text >

## 2022-04-07 NOTE — PROGRESS NOTE ADULT - SUBJECTIVE AND OBJECTIVE BOX
DATE OF SERVICE: 04-07-22 @ 10:52  CHIEF COMPLAINT:Patient is a 67y old  Male who presents with a chief complaint of Decreased urine output for the past week, leg oedema (06 Apr 2022 14:03)    	        PAST MEDICAL & SURGICAL HISTORY:  HTN (hypertension), benign    HLD (hyperlipidemia)    DM type 2 (diabetes mellitus, type 2)    TIA (transient ischemic attack)    Atrial fibrillation    MI (myocardial infarction)  circa 2014 and 2022.    CAD (coronary artery disease)    Neuropathy    Stage 3 chronic kidney disease    2019 novel coronavirus disease (COVID-19)  12/2021.  Received the COVID-19 vaccine x 2 as of April 2022.    Status post angioplasty with stent  LULU x 3 2/7/2014    S/P carotid endarterectomy  left          weak  less pain  no cp or sob  no abd pain   Medications:  MEDICATIONS  (STANDING):  allopurinol 100 milliGRAM(s) Oral daily  aspirin enteric coated 81 milliGRAM(s) Oral daily  atorvastatin 40 milliGRAM(s) Oral at bedtime  cefTRIAXone   IVPB 1000 milliGRAM(s) IV Intermittent every 24 hours  chlorhexidine 2% Cloths 1 Application(s) Topical daily  dextrose 5%. 1000 milliLiter(s) (100 mL/Hr) IV Continuous <Continuous>  dextrose 5%. 1000 milliLiter(s) (50 mL/Hr) IV Continuous <Continuous>  dextrose 50% Injectable 25 Gram(s) IV Push once  dextrose 50% Injectable 12.5 Gram(s) IV Push once  dextrose 50% Injectable 25 Gram(s) IV Push once  diltiazem Infusion 10 mG/Hr (10 mL/Hr) IV Continuous <Continuous>  glucagon  Injectable 1 milliGRAM(s) IntraMuscular once  heparin  Infusion.  Unit(s)/Hr (24 mL/Hr) IV Continuous <Continuous>  hydrALAZINE Injectable 10 milliGRAM(s) IV Push four times a day  insulin lispro (ADMELOG) corrective regimen sliding scale   SubCutaneous three times a day before meals  insulin lispro (ADMELOG) corrective regimen sliding scale   SubCutaneous at bedtime  metoprolol tartrate Injectable 7.5 milliGRAM(s) IV Push every 6 hours  sevelamer carbonate 800 milliGRAM(s) Oral three times a day with meals    MEDICATIONS  (PRN):  dextrose Oral Gel 15 Gram(s) Oral once PRN Blood Glucose LESS THAN 70 milliGRAM(s)/deciliter  heparin   Injectable 54417 Unit(s) IV Push every 6 hours PRN For aPTT less than 40  heparin   Injectable 5000 Unit(s) IV Push every 6 hours PRN For aPTT between 40 - 57    	    PHYSICAL EXAM:  T(C): 36.4 (04-07-22 @ 05:26), Max: 36.9 (04-06-22 @ 13:02)  HR: 90 (04-07-22 @ 05:26) (86 - 142)  BP: 137/68 (04-07-22 @ 05:26) (112/76 - 202/85)  RR: 20 (04-07-22 @ 05:26) (20 - 20)  SpO2: 95% (04-07-22 @ 05:26) (93% - 95%)  Wt(kg): --  I&O's Summary    06 Apr 2022 07:01  -  07 Apr 2022 07:00  --------------------------------------------------------  IN: 0 mL / OUT: 0 mL / NET: 0 mL        Appearance: Normal	  HEENT:   Normal oral mucosa, PERRL, EOMI	    Cardiovascular: reg irreg  Respiratory: dec bs   Gastrointestinal:  Soft, Non-tender, + BS	  	  Neurologic: Non-focal  Extremities: pvd  edema    TELEMETRY: 	    ECG:  	  RADIOLOGY:  OTHER: 	  	  LABS:	 	    CARDIAC MARKERS:                                10.9   9.94  )-----------( 205      ( 07 Apr 2022 07:09 )             35.7     04-07    132<L>  |  92<L>  |  101<H>  ----------------------------<  178<H>  5.1   |  19<L>  |  8.99<H>    Ca    8.9      07 Apr 2022 07:12  Phos  8.8     04-06  Mg     2.6     04-06      proBNP:   Lipid Profile:   HgA1c:   TSH:

## 2022-04-07 NOTE — PROGRESS NOTE ADULT - SUBJECTIVE AND OBJECTIVE BOX
Follow-up Pulm Progress Note    More oriented today, less agitated per daughter at bedside  Denies CP, SOB  O2 sats 95% on 2LNC.     Medications:  MEDICATIONS  (STANDING):  allopurinol 100 milliGRAM(s) Oral daily  aspirin enteric coated 81 milliGRAM(s) Oral daily  atorvastatin 40 milliGRAM(s) Oral at bedtime  cefTRIAXone   IVPB 1000 milliGRAM(s) IV Intermittent every 24 hours  chlorhexidine 2% Cloths 1 Application(s) Topical daily  dextrose 5%. 1000 milliLiter(s) (100 mL/Hr) IV Continuous <Continuous>  dextrose 5%. 1000 milliLiter(s) (50 mL/Hr) IV Continuous <Continuous>  dextrose 50% Injectable 25 Gram(s) IV Push once  dextrose 50% Injectable 12.5 Gram(s) IV Push once  dextrose 50% Injectable 25 Gram(s) IV Push once  diltiazem Infusion 10 mG/Hr (10 mL/Hr) IV Continuous <Continuous>  glucagon  Injectable 1 milliGRAM(s) IntraMuscular once  heparin  Infusion.  Unit(s)/Hr (24 mL/Hr) IV Continuous <Continuous>  hydrALAZINE Injectable 10 milliGRAM(s) IV Push four times a day  insulin lispro (ADMELOG) corrective regimen sliding scale   SubCutaneous three times a day before meals  insulin lispro (ADMELOG) corrective regimen sliding scale   SubCutaneous at bedtime  metoprolol tartrate Injectable 7.5 milliGRAM(s) IV Push every 6 hours  sevelamer carbonate 800 milliGRAM(s) Oral three times a day with meals    MEDICATIONS  (PRN):  dextrose Oral Gel 15 Gram(s) Oral once PRN Blood Glucose LESS THAN 70 milliGRAM(s)/deciliter  heparin   Injectable 73645 Unit(s) IV Push every 6 hours PRN For aPTT less than 40  heparin   Injectable 5000 Unit(s) IV Push every 6 hours PRN For aPTT between 40 - 57    Vital Signs Last 24 Hrs  T(C): 36.3 (07 Apr 2022 12:15), Max: 36.5 (06 Apr 2022 21:17)  T(F): 97.4 (07 Apr 2022 12:15), Max: 97.7 (06 Apr 2022 21:17)  HR: 63 (07 Apr 2022 12:15) (63 - 142)  BP: 138/60 (07 Apr 2022 12:15) (112/76 - 202/85)  BP(mean): --  RR: 20 (07 Apr 2022 12:15) (20 - 20)  SpO2: 94% (07 Apr 2022 12:15) (93% - 95%)  04-06 @ 07:01  -  04-07 @ 07:00  --------------------------------------------------------  IN: 0 mL / OUT: 0 mL / NET: 0 mL    LABS:                        10.9   9.94  )-----------( 205      ( 07 Apr 2022 07:09 )             35.7     04-07    132<L>  |  92<L>  |  101<H>  ----------------------------<  178<H>  5.1   |  19<L>  |  8.99<H>    Ca    8.9      07 Apr 2022 07:12  Phos  8.8     04-06  Mg     2.6     04-06    CAPILLARY BLOOD GLUCOSE  192 (07 Apr 2022 08:05)  POCT Blood Glucose.: 206 mg/dL (07 Apr 2022 12:20)    PTT - ( 07 Apr 2022 07:09 )  PTT:49.0 sec    CULTURES:     Culture - Blood (collected 04-04-22 @ 17:46)  Source: .Blood Blood-Peripheral  Preliminary Report (04-05-22 @ 18:00):    No growth to date.    Culture - Blood (collected 04-04-22 @ 17:46)  Source: .Blood Blood-Peripheral  Preliminary Report (04-05-22 @ 18:00):    No growth to date.    Culture - Urine (collected 04-03-22 @ 04:11)  Source: Clean Catch Clean Catch (Midstream)  Final Report (04-04-22 @ 12:00):    <10,000 CFU/mL Normal Urogenital Tiffany    Physical Examination:  PULM: Decreased at bases  CVS: RRR    RADIOLOGY REVIEWED    CT chest: ct< from: CT Chest No Cont (04.04.22 @ 16:52) >    FINDINGS:    AIRWAYS, LUNGS, PLEURA: Tracheal secretions. Small left greater than   right pleural effusions increased compared to 2/19/2022. Right-sided   pleural thickening. Lingular and left greater than right basilar   opacification, likely atelectasis.    MEDIASTINUM: Cardiomegaly. No pericardial effusion. Thoracic aorta normal   caliber.  No large mediastinal lymph nodes.    IMAGED ABDOMEN: Nonspecific perinephric stranding.    SOFT TISSUES: Unremarkable.    BONES: Unremarkable.    IMPRESSION:.    Small left greater than right bilateral pleural effusions increased in   size compared to 2/19/2022.    Lingular and left greater than right basilar opacification, likely   atelectasis.    --- End of Report ---      < end of copied text >

## 2022-04-07 NOTE — CONSULT NOTE ADULT - SUBJECTIVE AND OBJECTIVE BOX
Interventional Radiology Inpatient Consult Note       HPI:  NIGHT HOSPITALIST:   Patient remotely known to me from an admission to [facility] in Feb 2017--assigned to me at this point by the ER to admit this 68 y/o M--followed by his nephrologist above--patient with a history of CAD with past multiple PCI, with most recent discharge from [facility] in Feb 2022 for non ST elevation MI with LULU of an 85% prox LAD lesion with patient on ASA and now off Plavix per cardiology (only for one month), chronic atrial fibrillation maintained on Pradaxa, essential HTN, type 2 DM previously on oral medications but on insulin, diabetic neuropathy with chronic RIGHT LE venous stasis changes>left, LEFT foot ulcer seen by podiatry, past endarterectomy LEFT CEA in 2014. with patient self referring to the ER following apparently treatment by an urgent care center for an apparent cystitis with hematuria on nitrofurantoin but with patient noting decreased urine output for the past week, and difficulty with B/L coarse tremors for several weeks, with patient having difficulty negotiating his applications on his smart phone (observed by examiner).  Patient with increasing B/L LE oedema and weight gain, with patient with 2 pillow orthopnoea.      Patient was recommended for a [place] but patient refused, patient wishing to review the issue with his daughter.    Patient with past history of COVID-19 in 12/2021 and has received the COVID-19 vaccine x 2.    NO fever, no chills, no rigors.  No diaphoresis.  No back pain, no tearing back pain. (03 Apr 2022 06:29)      Vital Signs: Vital Signs Last 24 Hrs  T(C): 36.4 (07 Apr 2022 10:55), Max: 36.9 (06 Apr 2022 13:02)  T(F): 97.6 (07 Apr 2022 10:55), Max: 98.5 (06 Apr 2022 13:02)  HR: 81 (07 Apr 2022 10:55) (81 - 142)  BP: 149/68 (07 Apr 2022 10:55) (112/76 - 202/85)  BP(mean): --  RR: 20 (07 Apr 2022 10:55) (20 - 20)  SpO2: 95% (07 Apr 2022 10:55) (93% - 95%)    Past Medical/ Surgical History: PAST MEDICAL & SURGICAL HISTORY:  HTN (hypertension), benign    HLD (hyperlipidemia)    DM type 2 (diabetes mellitus, type 2)    TIA (transient ischemic attack)    Atrial fibrillation    MI (myocardial infarction)  circa 2014 and 2022.    CAD (coronary artery disease)    Neuropathy    Stage 3 chronic kidney disease    2019 novel coronavirus disease (COVID-19)  12/2021.  Received the COVID-19 vaccine x 2 as of April 2022.    Status post angioplasty with stent  LULU x 3 2/7/2014    S/P carotid endarterectomy  left        Allergies: Allergies    nitrofurantoin (Nephrotoxicity)    Intolerances        Medications: MEDICATIONS  (STANDING):  allopurinol 100 milliGRAM(s) Oral daily  aspirin enteric coated 81 milliGRAM(s) Oral daily  atorvastatin 40 milliGRAM(s) Oral at bedtime  cefTRIAXone   IVPB 1000 milliGRAM(s) IV Intermittent every 24 hours  chlorhexidine 2% Cloths 1 Application(s) Topical daily  dextrose 5%. 1000 milliLiter(s) (100 mL/Hr) IV Continuous <Continuous>  dextrose 5%. 1000 milliLiter(s) (50 mL/Hr) IV Continuous <Continuous>  dextrose 50% Injectable 25 Gram(s) IV Push once  dextrose 50% Injectable 12.5 Gram(s) IV Push once  dextrose 50% Injectable 25 Gram(s) IV Push once  diltiazem Infusion 10 mG/Hr (10 mL/Hr) IV Continuous <Continuous>  glucagon  Injectable 1 milliGRAM(s) IntraMuscular once  heparin  Infusion.  Unit(s)/Hr (24 mL/Hr) IV Continuous <Continuous>  hydrALAZINE Injectable 10 milliGRAM(s) IV Push four times a day  insulin lispro (ADMELOG) corrective regimen sliding scale   SubCutaneous three times a day before meals  insulin lispro (ADMELOG) corrective regimen sliding scale   SubCutaneous at bedtime  metoprolol tartrate Injectable 7.5 milliGRAM(s) IV Push every 6 hours  sevelamer carbonate 800 milliGRAM(s) Oral three times a day with meals    MEDICATIONS  (PRN):  dextrose Oral Gel 15 Gram(s) Oral once PRN Blood Glucose LESS THAN 70 milliGRAM(s)/deciliter  heparin   Injectable 43000 Unit(s) IV Push every 6 hours PRN For aPTT less than 40  heparin   Injectable 5000 Unit(s) IV Push every 6 hours PRN For aPTT between 40 - 57      SOCIAL HISTORY:    FAMILY HISTORY:  Family history of heart disease  father    Family history of CVA  mother          PHYSICAL EXAM:    General:   Well-groomed, well-nourished, in no distress  Lungs:  CTA bilaterally  Cardiovascular:   S1, S2,   Abdomen:  Soft, non-tender, non-distended,   Extremities:  no calf tenderness/swelling bilaterally  Musculoskeletal:  Full ROM in all joints w/o swelling/tenderness/effusion  Neuro/Psych:  A &O x 3    LABS:                        10.9   9.94  )-----------( 205      ( 07 Apr 2022 07:09 )             35.7     04-07    132<L>  |  92<L>  |  101<H>  ----------------------------<  178<H>  5.1   |  19<L>  |  8.99<H>    Ca    8.9      07 Apr 2022 07:12  Phos  8.8     04-06  Mg     2.6     04-06      PTT - ( 07 Apr 2022 07:09 )  PTT:49.0 sec      RADIOLOGY & ADDITIONAL STUDIES:      A/P: Pt was noted to be combative overnight, but the issue has resolved by time of this note as per d/w Dr. Cordova (Nephro). Likely cause was uremia vs. hospital delirum or a combination thereof. Pt currently w/groin HD catheter; wants removal w/placement of IJ HD catheter. States pt is back near baseline this AM and is cooperative.    - procedure approved; plan for add-on in afternoon vs. tomorrow AM based upon f/u INR labs  - Please place order for IR Procedure, approving attending Dr. Watkins  - Stop heparin ~90m prior to procedure  - maintain active type and screen x 2  - repeat INR STAT

## 2022-04-07 NOTE — PROGRESS NOTE ADULT - SUBJECTIVE AND OBJECTIVE BOX
[facility], Division of Infectious Diseases  ADRIANA Seals Y. Patel, S. Shah, G. [facility]  [phone]    Name: DYLAN DEL CASTILLO  Age: 67y  Gender: Male  MRN: [id number]    Interval History:  Patient seen and examined at bedside this morning  No acute overnight events. Afebrile  Continues to remain confused  Was getting set up for HD  Daughter at bedside  Notes reviewed    Antibiotics:  cefTRIAXone   IVPB 1000 milliGRAM(s) IV Intermittent every 24 hours      Medications:  allopurinol 100 milliGRAM(s) Oral daily  aspirin enteric coated 81 milliGRAM(s) Oral daily  atorvastatin 40 milliGRAM(s) Oral at bedtime  cefTRIAXone   IVPB 1000 milliGRAM(s) IV Intermittent every 24 hours  chlorhexidine 2% Cloths 1 Application(s) Topical daily  dextrose 5%. 1000 milliLiter(s) IV Continuous <Continuous>  dextrose 5%. 1000 milliLiter(s) IV Continuous <Continuous>  dextrose 50% Injectable 25 Gram(s) IV Push once  dextrose 50% Injectable 12.5 Gram(s) IV Push once  dextrose 50% Injectable 25 Gram(s) IV Push once  dextrose Oral Gel 15 Gram(s) Oral once PRN  diltiazem Infusion 10 mG/Hr IV Continuous <Continuous>  glucagon  Injectable 1 milliGRAM(s) IntraMuscular once  heparin   Injectable 66915 Unit(s) IV Push every 6 hours PRN  heparin   Injectable 5000 Unit(s) IV Push every 6 hours PRN  heparin  Infusion.  Unit(s)/Hr IV Continuous <Continuous>  hydrALAZINE Injectable 10 milliGRAM(s) IV Push four times a day  insulin lispro (ADMELOG) corrective regimen sliding scale   SubCutaneous three times a day before meals  insulin lispro (ADMELOG) corrective regimen sliding scale   SubCutaneous at bedtime  metoprolol tartrate Injectable 7.5 milliGRAM(s) IV Push every 6 hours  sevelamer carbonate 800 milliGRAM(s) Oral three times a day with meals      Review of Systems:  unable to obtain    Allergies: nitrofurantoin (Nephrotoxicity)    For details regarding the patient's past medical history, social history, family history, and other miscellaneous elements, please refer the initial infectious diseases consultation and/or the admitting history and physical examination for this admission.    Objective:  Vitals:   T(C): 36.3 (04-07-22 @ 12:15), Max: 36.5 (04-06-22 @ 21:17)  HR: 63 (04-07-22 @ 12:15) (63 - 142)  BP: 138/60 (04-07-22 @ 12:15) (112/76 - 202/85)  RR: 20 (04-07-22 @ 12:15) (20 - 20)  SpO2: 94% (04-07-22 @ 12:15) (93% - 95%)    Physical Examination:  General: no acute distress  HEENT: NC/AT, EOMI  Cardio: S1, S2 heard, RRR, no murmurs  Resp: breath sounds heard bilaterally, no rales, wheezes or rhonchi  Abd: soft, NT, ND  Ext: no edema or cyanosis  Skin: warm, dry, no visible rash    Laboratory Studies:  CBC:                       10.9   9.94  )-----------( 205      ( 07 Apr 2022 07:09 )             35.7     CMP: 04-07    132<L>  |  92<L>  |  101<H>  ----------------------------<  178<H>  5.1   |  19<L>  |  8.99<H>    Ca    8.9      07 Apr 2022 07:12  Phos  8.8     04-06  Mg     2.6     04-06            Microbiology: reviewed    Culture - Blood (collected 04-04-22 @ 17:46)  Source: .Blood Blood-Peripheral  Preliminary Report (04-05-22 @ 18:00):    No growth to date.    Culture - Blood (collected 04-04-22 @ 17:46)  Source: .Blood Blood-Peripheral  Preliminary Report (04-05-22 @ 18:00):    No growth to date.    Culture - Urine (collected 04-03-22 @ 04:11)  Source: Clean Catch Clean Catch (Midstream)  Final Report (04-04-22 @ 12:00):    <10,000 CFU/mL Normal Urogenital Tiffany        Radiology: reviewed

## 2022-04-07 NOTE — PROGRESS NOTE ADULT - REASON FOR ADMISSION
Decreased urine output for the past week, leg oedema Decreased urine output for the past week, leg edema

## 2022-04-07 NOTE — PROGRESS NOTE ADULT - PROBLEM SELECTOR PLAN 5
-S/p MICU evaluation 4/5 for lethargy  -Intermittent confusion/agitation, improving today  -ABG with no CO2 retention, sedating medications held  -? if 2nd to uremia, HD per renal.

## 2022-04-07 NOTE — PROGRESS NOTE ADULT - SUBJECTIVE AND OBJECTIVE BOX
[facility]   [phone]  RENAL FOLLOW UP NOTE  --------------------------------------------------------------------------------  Chief Complaint:    24 hour events/subjective:    Patient seen and examined with Dr. Art gordon, answering questions  wife at bedside    PAST HISTORY  --------------------------------------------------------------------------------  No significant changes to PMH, PSH, FHx, SHx, unless otherwise noted    ALLERGIES & MEDICATIONS  --------------------------------------------------------------------------------  Allergies    nitrofurantoin (Nephrotoxicity)    Intolerances      Standing Inpatient Medications  allopurinol 100 milliGRAM(s) Oral daily  aspirin enteric coated 81 milliGRAM(s) Oral daily  atorvastatin 40 milliGRAM(s) Oral at bedtime  cefTRIAXone   IVPB 1000 milliGRAM(s) IV Intermittent every 24 hours  chlorhexidine 2% Cloths 1 Application(s) Topical daily  dextrose 5%. 1000 milliLiter(s) IV Continuous <Continuous>  dextrose 5%. 1000 milliLiter(s) IV Continuous <Continuous>  dextrose 50% Injectable 25 Gram(s) IV Push once  dextrose 50% Injectable 12.5 Gram(s) IV Push once  dextrose 50% Injectable 25 Gram(s) IV Push once  diltiazem Infusion 10 mG/Hr IV Continuous <Continuous>  glucagon  Injectable 1 milliGRAM(s) IntraMuscular once  heparin  Infusion.  Unit(s)/Hr IV Continuous <Continuous>  hydrALAZINE Injectable 10 milliGRAM(s) IV Push four times a day  insulin lispro (ADMELOG) corrective regimen sliding scale   SubCutaneous three times a day before meals  insulin lispro (ADMELOG) corrective regimen sliding scale   SubCutaneous at bedtime  metoprolol tartrate Injectable 7.5 milliGRAM(s) IV Push every 6 hours  sevelamer carbonate 800 milliGRAM(s) Oral three times a day with meals    PRN Inpatient Medications  dextrose Oral Gel 15 Gram(s) Oral once PRN  heparin   Injectable 64428 Unit(s) IV Push every 6 hours PRN  heparin   Injectable 5000 Unit(s) IV Push every 6 hours PRN      REVIEW OF SYSTEMS  --------------------------------------------------------------------------------    Gen: denies fevers/chills,  CVS: denies chest pain/palpitations  Resp: denies SOB/Cough  GI: Denies N/V/Abd pain  : Denies dysuria    VITALS/PHYSICAL EXAM  --------------------------------------------------------------------------------  T(C): 36.4 (04-07-22 @ 10:55), Max: 36.9 (04-06-22 @ 13:02)  HR: 81 (04-07-22 @ 10:55) (81 - 142)  BP: 149/68 (04-07-22 @ 10:55) (112/76 - 202/85)  RR: 20 (04-07-22 @ 10:55) (20 - 20)  SpO2: 95% (04-07-22 @ 10:55) (93% - 95%)  Wt(kg): --        04-06-22 @ 07:01  -  04-07-22 @ 07:00  --------------------------------------------------------  IN: 0 mL / OUT: 0 mL / NET: 0 mL      Physical Exam:  	  	Gen: Alert today, oriented x 2, on O2    	Pulm: Decreased breath sounds b/l bases. +crackles. no ronchi or wheezing  	CV: No JVD. IRR, tachy   	Abd: +BS, soft, nontender, softly distended, obese   	: No suprapubic tenderness.               Extremity UE: increased warmth, swollen fingers B/L               Extremity LE: + hyperpigmented bilateral LE with B/L LE erythema and increased warmth, non-pitting edema              Vascular: JOYCELYN [facility] HD cath    LABS/STUDIES  --------------------------------------------------------------------------------              10.9   9.94  >-----------<  205      [04-07-22 @ 07:09]              35.7     132  |  92  |  101  ----------------------------<  178      [04-07-22 @ 07:12]  5.1   |  19  |  8.99        Ca     8.9     [04-07-22 @ 07:12]      Mg     2.6     [04-06-22 @ 03:27]      Phos  8.8     [04-06-22 @ 03:27]        PTT: 49.0       [04-07-22 @ 07:09]      Creatinine Trend:  SCr 8.99 [04-07 @ 07:12]  SCr 6.85 [04-06 @ 03:27]  SCr 7.64 [04-05 @ 00:18]  SCr 8.51 [04-04 @ 06:27]  SCr 8.29 [04-03 @ 06:58]              Urinalysis - [04-03-22 @ 00:48]      Color Light Orange / Appearance Turbid / SG 1.021 / pH 5.5      Gluc Negative / Ketone Negative  / Bili Negative / Urobili Negative       Blood Large / Protein 300 mg/dL / Leuk Est Large / Nitrite Negative      RBC 20 /  / Hyaline 10 / Gran  / Sq Epi  / Non Sq Epi 3 / Bacteria Negative    Urine Creatinine 153      [04-03-22 @ 00:48]  Urine Protein 71      [04-03-22 @ 00:48]  Urine Sodium 11      [04-03-22 @ 00:48]  Urine Chloride <20      [04-03-22 @ 00:48]    Iron 31, TIBC 243, %sat 13      [04-04-22 @ 00:17]  Ferritin 152      [04-03-22 @ 23:06]  PTH -- (Ca --)      [04-03-22 @ 23:06]   97  HbA1c 11.7      [11-07-19 @ 09:14]  TSH 1.98      [04-05-22 @ 05:19]

## 2022-04-07 NOTE — PROGRESS NOTE ADULT - PROBLEM SELECTOR PLAN 1
Likely 2nd to b/l pl effusions, atelectasis  -Suspect fluid overload given elevated proBNP, LE edema on admission   -Keep O>I with HD as tolerated   -Pt with hx of hydroPTX. CT chest in 2/2022 with pleural thickening, atelectasis. Findings at the time suspected to be chronic. CT A/P 4/2 with small b/l pl effusions L>R, pleural thickening  -CT chest now with small b/l pl effusion R>L, bibasilar atelectasis  -Keep sats >90% with supplemental O2 as needed (currently 2LNC). Likely 2nd to b/l pl effusions, atelectasis  -Suspect fluid overload given elevated proBNP, LE edema on admission   -Keep O>I with HD as tolerated   -Pt with hx of hydroPTX. CT chest in 2/2022 with pleural thickening, atelectasis. Findings at the time suspected to be chronic. CT A/P 4/2 with small b/l pl effusions L>R, pleural thickening  -CT chest now with small b/l pl effusion R>L, bibasilar atelectasis  -Keep sats >90% with supplemental O2 as needed (currently 2LNC)  -Serum Co2 downtrending in the setting of renal failure, check ABG. Likely 2nd to b/l pl effusions, atelectasis  -Suspect fluid overload given elevated proBNP, LE edema on admission   -Keep O>I with HD as tolerated   -Pt with hx of hydroPTX. CT chest in 2/2022 with pleural thickening, atelectasis. Findings at the time suspected to be chronic. CT A/P 4/2 with small b/l pl effusions L>R, pleural thickening  -CT chest now with small b/l pl effusion R>L, bibasilar atelectasis  -Keep sats >90% with supplemental O2 as needed (currently 2LNC)

## 2022-04-07 NOTE — PROGRESS NOTE ADULT - SUBJECTIVE AND OBJECTIVE BOX
CARDIOLOGY FOLLOW UP NOTE - DR. SAGASTUME    Patient Name: DYLAN DEL CASTILLO  Date of Service: 22 @ 14:02    Patient seen and examined  confused    Subjective:    cv: denies chest pain, dyspnea, palpitations, dizziness  pulmonary: denies cough  GI: denies abdominal pain, nausea, vomiting  vascular/legs: no edema   skin: no rash  ROS: otherwise negative   overnight events:      PHYSICAL EXAM:  T(C): 36.3 (22 @ 12:15), Max: 36.5 (22 @ 21:17)  HR: 63 (22 @ 12:15) (63 - 142)  BP: 138/60 (22 @ 12:15) (112/76 - 202/85)  RR: 20 (22 @ 12:15) (20 - 20)  SpO2: 94% (22 @ 12:15) (93% - 95%)  Wt(kg): --  I&O's Summary    2022 07:01  -  2022 07:00  --------------------------------------------------------  IN: 0 mL / OUT: 0 mL / NET: 0 mL      Daily     Daily Weight in k.5 (2022 12:15)    Appearance: Normal	  Cardiovascular: Normal S1 S2,RRR, No JVD, No murmurs  Respiratory: Lungs clear to auscultation	  Gastrointestinal:  Soft, Non-tender, + BS	  Extremities: Normal range of motion, No clubbing, cyanosis or edema      Home Medications:  allopurinol 100 mg oral tablet: 1 tab(s) orally once a day (2022 06:34)  aspirin 81 mg oral delayed release tablet: 1 tab(s) orally once a day (2022 06:34)  bumetanide 2 mg oral tablet: 1 tab(s) orally once a day (2022 06:34)  insulin glargine 100 units/mL subcutaneous solution: 25 unit(s) subcutaneous once a day (at bedtime) (2022 06:34)  metOLazone 5 mg oral tablet: 1 tab(s) orally 3 times a week (2022 06:34)  metoprolol succinate 50 mg oral tablet, extended release: 2 tab(s) orally once a day (2022 06:34)  NovoLOG 100 units/mL subcutaneous solution: subcutaneous 4 times a day (before meals and at bedtime) (2022 06:34)  NovoLOG FlexPen 100 units/mL injectable solution: 10 unit(s) subcutaneous 3 times a day (2022 06:34)  oxycodone-acetaminophen 5 mg-325 mg oral tablet: 1 tab(s) orally 2 times a day (2022 06:34)  Pradaxa 150 mg oral capsule: 1 cap(s) orally 2 times a day (2022 06:34)  pregabalin 100 mg oral capsule: 1 cap(s) orally 3 times a day (2022 06:34)      MEDICATIONS  (STANDING):  allopurinol 100 milliGRAM(s) Oral daily  aspirin enteric coated 81 milliGRAM(s) Oral daily  atorvastatin 40 milliGRAM(s) Oral at bedtime  cefTRIAXone   IVPB 1000 milliGRAM(s) IV Intermittent every 24 hours  chlorhexidine 2% Cloths 1 Application(s) Topical daily  dextrose 5%. 1000 milliLiter(s) (100 mL/Hr) IV Continuous <Continuous>  dextrose 5%. 1000 milliLiter(s) (50 mL/Hr) IV Continuous <Continuous>  dextrose 50% Injectable 25 Gram(s) IV Push once  dextrose 50% Injectable 12.5 Gram(s) IV Push once  dextrose 50% Injectable 25 Gram(s) IV Push once  diltiazem Infusion 10 mG/Hr (10 mL/Hr) IV Continuous <Continuous>  glucagon  Injectable 1 milliGRAM(s) IntraMuscular once  heparin  Infusion.  Unit(s)/Hr (24 mL/Hr) IV Continuous <Continuous>  hydrALAZINE Injectable 10 milliGRAM(s) IV Push four times a day  insulin lispro (ADMELOG) corrective regimen sliding scale   SubCutaneous three times a day before meals  insulin lispro (ADMELOG) corrective regimen sliding scale   SubCutaneous at bedtime  metoprolol tartrate Injectable 7.5 milliGRAM(s) IV Push every 6 hours  sevelamer carbonate 800 milliGRAM(s) Oral three times a day with meals      TELEMETRY: 	    ECG:  	  RADIOLOGY:   DIAGNOSTIC TESTING:  [ ] Echocardiogram:  [ ] Catheterization:  [ ] Stress Test:    OTHER: 	    LABS:	 	    CARDIAC MARKERS:                                      10.9   9.94  )-----------( 205      ( 2022 07:09 )             35.7     04-07    132<L>  |  92<L>  |  101<H>  ----------------------------<  178<H>  5.1   |  19<L>  |  8.99<H>    Ca    8.9      2022 07:12  Phos  8.8     04-06  Mg     2.6     04-06      proBNP:   PT/INR - ( 2022 13:08 )   PT: 19.3 sec;   INR: 1.66 ratio         PTT - ( 2022 13:08 )  PTT:97.0 sec  Lipid Profile:   HgA1c:     Creatinine, Serum: 8.99 mg/dL (22 @ 07:12)  Creatinine, Serum: 6.85 mg/dL (22 @ 03:27)  Creatinine, Serum: 7.64 mg/dL (22 @ 00:18)

## 2022-04-07 NOTE — PROGRESS NOTE ADULT - ASSESSMENT
·  Problem: Acute kidney injury superimposed on CKD. pt currently on hd  to cont as per renal         Problem/Plan - 2:  ·  Problem: Chronic atrial fibrillation. c/w a/c  cards f/u     Problem/Plan - 3:  ·  Problem: Cystitis.   treatment as per id     Problem/Plan - 4:  ·  Problem: Type 2 diabetes mellitus. c/w insulin   endo f/ui     Problem/Plan - 5:  ·  Problem: CAD (coronary artery disease).   likely volume overload contributing to resp status  .   Presently clinically stable.      discussed w/ wife at bedside

## 2022-04-07 NOTE — PROGRESS NOTE ADULT - ASSESSMENT
67 year old gentleman with PMH of CAD, NSTEMI s/p 3 stents in 2014 (stents to LAD, proximal and distal LCx), ischemic cardiomyopathy, HTN for 10 years, T2DM for 26 years c/b peripheral neuropathy, CVA, TIA, Afib on Pradaxa, chronic RLE wound, L carotid stenosis s/p endarterectomy (2014) just recently admitted  to the [facility] on 2/19/2022 with acute onset chest pain for one day found to have an NSTEMI, CAD, s/p PCI in setting of increased AF rates off bb for 3 days and resolved chest pain, his CKMB peaked and Echo noted with EF 60%,normal LV function, mild TR, mild FL. He was s/p [facility] with 85% proximal LAD s/p balloon angioplasty and LULU. He presented to [facility] ED with decreased urine output over the week. [facility]. [facility] went to [facility] for hematuria and was started  on Nitrofurantoin. Pt is still taking Nitrofurantoin w/ 5 days left. He came to the ED due to worsening symptoms. Pt reports having a similar incident 4-5 years ago that resolved spontaneously. He reports increased leg edema Dyspnea worse on exertion. his baseline Serum creatinine is in the 2.0 to 2.3 mg/dl range. ANT with serum creatinine of 8.29 with bun 144 and k 5.5        1- ANT on ckd III   2- chf chronic   3- hyperkalemia   4- uremia  5- hyperphosphatemia       Initiation of Urgent HD, given he is uremic  etiology of ANT ? infection and +/- volume depletion  HD today  F180, 210 min, , , 1L fluid removal  see HD flowsheet  tachycardic, on cardizem drip 10 mg/hr   would resume oral BP meds if tolerating PO  no hydro on ct abd  +U/A  blood cx NGTD  ceftriaxone 1 G daily - completed  renvela one tab with meals  strict I/O  trend labs daily  IR consulted. plan to transition to temporary IJ catheter today/tomorrow   type and screen ordered.

## 2022-04-07 NOTE — CHART NOTE - NSCHARTNOTEFT_GEN_A_CORE
Pt currently in dialysis and on heparin gtt; planned procedure for IJ HD catheter insertion would require 90m off the heparin gtt. As dialysis is unlikely to be completed until ~330/4pm, the case will be scheduled as first AM (8am) case TOMORROW (4/7). This plan was communicated to [facility] ([id number]) and with a message left at his outpatient's nephrology office (Dr. Cordova).    PLAN:  - Internal jugular HD catheter insertion tomorrow AM (4/7) at ~8am; provided no patient specific concerns or emergent priority cases arise.  - Please place order for IR Procedure, approving attending Dr. Watkins  - STOP HEPARIN GTT @630AM on 4/8 (tomorrow) if pt remains amenable to early AM insertion of the catheter   - maintain active type and screen x 2 (within 72hrs of procedure)  - COVID PCR result within 72hrs of procedure  - Please draw AM labs - CBC/INR/BMP/aPTT as per phone discussion.  - d/w IR scheduling and IR attending Dr. Watkins  - Team can decide if they wish to maintain the femoral line o/n or if they wish to discontinue the femoral line post dialysis in anticipation of IJ line tomorrow.

## 2022-04-07 NOTE — PROGRESS NOTE ADULT - ASSESSMENT
Patient is a 67 year old male with PMH of CAD with past multiple PCI, with most recent discharge from [facility] in Feb 2022 for NSTEMI with LULU of an 85% prox LAD lesion with patient on ASA and now off Plavix per cardiology (only for one month), chronic afib on Pradaxa, HTN, type 2 DM on insulin, diabetic neuropathy with chronic RIGHT LE venous stasis changes>left, LEFT foot ulcer seen by podiatry, past endarterectomy LEFT CEA in 2014 who presented with UTI with hematuria diagnosed at urgent care and now with decreased urine output.     Acute cystitis/UTI    - noted dark urine with hematuria, went to  and was prescribed macrobid, had some improvement in urine color but then noticed decreased urine output with no voiding reported in last 2 days   - UA here with pyuria -  with large LE, negative nitrites and no bacteria    - CTAP reviewed - no hydronephrosis, nephrolithiasis, or evidence of obstructive uropathy   - urine culture negative   - blood cultures negative   - continue on ceftriaxone, plan x7 day course until 4/9/22   - monitor temps/WBC    ANT on CKD3   - Nephrology following, appreciate recs   - s/p placement of shiley and initiation of HD   - monitor Cr    - renally dose meds     B/l LE edema with chronic venous stasis  Acute on chronic HFpEF   - legs cool to touch, nontender, chronic changes with no sign of infection/cellulitis   - has wound on bottom of L foot - dry and does not appear infected either   - clinically overloaded, Cardiology and Nephrology following, on IV bumex, for HD   - elevate lower extremities      DM2 with neuropathy   - Blood glucose management per primary team.    D/w daughter at bedside    Infectious Diseases will continue to follow. Please call with any questions.   Annie King M.D.  [facility], Division of Infectious Diseases [phone]

## 2022-04-07 NOTE — PROGRESS NOTE ADULT - ASSESSMENT
A/P    66 y/o M with history of CAD, s/p multiple PCI, with most recent 2/22 PCI of LAD in setting of NSTEMI, on ASA only (pradaxa), chronic atrial fibrillation maintained on Pradaxa, essential HTN, type 2 DM previously on oral medications but on insulin, diabetic neuropathy with chronic RIGHT LE venous stasis changes>left, LEFT foot ulcer seen by podiatry, past endarterectomy LEFT CEA in 2014 presenting with 1 week of decreased urine output, cystitis with hematuria     #ANT on CKD  -oliguric renal failure in setting of ? UTI/cystitis, r/o obstruction ? secondary to abx   -worsened with diuretic use but unlikely due to hypovolemia alone from diuretic use   -r/o bacteremia, r/o other infection ?leg cellulitis  - UA+ , Ucx noted -iv abx per id , BCX NGTD   -hyperkalemia improved   -New HD for uremia, renal failure  -renal f/u     #AMS/Lethargy  -slowly improving   -likely 2/2 Uremic encephalopathy   -ABG noted  -med f/u   -MICU eval noted     #Acute on chronic HFpEF  -stable, remains clinically overloaded but not decompensated  -CT chest noted   -volume removal with HD  -bumex per renal   -not tolerating PO - c/w IVP bb     #Chronic AF  -cont IVP BB  -cardizem gtt as needed  -c/w hep gtt     #HTN  -pt not tolerating PO meds  -IV hydral, bb    #CAD, s/p PCI, most recent 2/2022  -stable, cont ASA, off plavix due to pradaxa indication  -statin as able     #R carotid stenosis  -cont med tx  -MRA noted with Greater than 75% stenosis of the right distal common carotid artery. Approximately 60% stenosis of the proximal left internal carotid artery.  -Continue ASA and Statin Therapy  -vasc outpt f/u Dr Pinto    ACP- Advanced Care Planning  -Advanced care planning discussed with patient. Advanced care planning forms discussed with patient and/or family.  Risks, benefits, and alternatives of medical/cardiac procedures were discussed in detail with all questions answered.  30 minutes were spent addressing advance care planning.        plan discussed with family at bedside     45 minutes spent on total encounter; more than 50% of the visit was spent counseling and/or coordinating care by the attending physician.

## 2022-04-07 NOTE — PROGRESS NOTE ADULT - NS ATTEND AMEND GEN_ALL_CORE FT
alert when seen   pt family at bedside   lungs decrease bs no rales  heart IRR  ext hyperpigmented     right femoral cath to be changed to IJ cath and then dc femoral cath.   IR contacted  temp hd cath given pt is overall uremic still   hd today as planned

## 2022-04-08 NOTE — PROGRESS NOTE ADULT - ASSESSMENT
·  Problem: Acute kidney injury superimposed on CKD. pt currently on hd  to cont as per renal  perm cath on monday with more fluid removal  pulm / cards f.u         Problem/Plan - 2:  ·  Problem: Chronic atrial fibrillation. c/w a/c  cards f/u     Problem/Plan - 3:  ·  Problem: Cystitis.   treatment as per id     Problem/Plan - 4:  ·  Problem: Type 2 diabetes mellitus. c/w insulin   endo f/ui     Problem/Plan - 5:  ·  Problem: CAD (coronary artery disease).   likely volume overload contributing to resp status  .   Presently clinically stable.      discussed w/ wife at bedside

## 2022-04-08 NOTE — PROGRESS NOTE ADULT - NS ATTEND AMEND GEN_ALL_CORE FT
nc o2 and keep sat>90%   continue abx  dw family  prognosis guarded nc o2 and keep sat>90%   dw family  prognosis guarded

## 2022-04-08 NOTE — PROGRESS NOTE ADULT - ASSESSMENT
67 year old gentleman with PMH of CAD, NSTEMI s/p 3 stents in 2014 (stents to LAD, proximal and distal LCx), ischemic cardiomyopathy, HTN for 10 years, T2DM for 26 years c/b peripheral neuropathy, CVA, TIA, Afib on Pradaxa, chronic RLE wound, L carotid stenosis s/p endarterectomy (2014) just recently admitted  to the [facility] on 2/19/2022 with acute onset chest pain for one day found to have an NSTEMI, CAD, s/p PCI in setting of increased AF rates off bb for 3 days and resolved chest pain, his CKMB peaked and Echo noted with EF 60%,normal LV function, mild TR, mild OR. He was s/p C with 85% proximal LAD s/p balloon angioplasty and LULU. He presented to [facility] ED with decreased urine output over the week. [facility] for [facility] and was started  on Nitrofurantoin. Pt is still taking Nitrofurantoin w/ 5 days left. He came to the ED due to worsening symptoms. Pt reports having a similar incident 4-5 years ago that resolved spontaneously. He reports increased leg edema Dyspnea worse on exertion. his baseline Serum creatinine is in the 2.0 to 2.3 mg/dl range. ANT with serum creatinine of 8.29 with bun 144 and k 5.5        1- ANT on ckd III   2- chf chronic   3- hyperkalemia   4- uremia  5- hyperphosphatemia       Initiation of Urgent HD, given he is uremic  etiology of ANT ? infection and +/- volume depletion  worsening SOB, unable to have IJ HD cath placement, unable to lay flat  HD today  F180, 210 min, , , 1.5L fluid removal  see HD flowsheet  will also plan for HD tomorrow.   tachycardic, on cardizem drip 10 mg/hr   would resume oral BP meds if tolerating PO  no hydro on ct abd  +U/A  blood cx NGTD  ceftriaxone 1 G daily - completed  renvela one tab with meals  strict I/O  trend labs daily

## 2022-04-08 NOTE — PROGRESS NOTE ADULT - PROBLEM SELECTOR PLAN 1
Likely 2nd to b/l pl effusions, atelectasis  -Suspect fluid overload given elevated proBNP, LE edema on admission   -Keep O>I with HD as tolerated   -Pt with hx of hydroPTX. CT chest in 2/2022 with pleural thickening, atelectasis. Findings at the time suspected to be chronic. CT A/P 4/2 with small b/l pl effusions L>R, pleural thickening  -CT chest now with small b/l pl effusion R>L, bibasilar atelectasis  -Keep sats >90% with supplemental O2 as needed (currently 2LNC)

## 2022-04-08 NOTE — CHART NOTE - NSCHARTNOTEFT_GEN_A_CORE
Patient seen and examined in IR holding. Patient noted to be lethargic and unable to tolerate lying flat (desaturates). Right femoral non tunneled HD catheter noted in place, dressing clean dry and intact. Would recommend to use existing catheter for HD until patient about to tolerate positioning for IJ non tunneled HD catheter.    Call [phone] with any questions regarding above.

## 2022-04-08 NOTE — PROGRESS NOTE ADULT - SUBJECTIVE AND OBJECTIVE BOX
[facility]   [phone]  RENAL FOLLOW UP NOTE  --------------------------------------------------------------------------------  Chief Complaint:    24 hour events/subjective:    Patient seen and examined.   +SOB  unable to tolerate laying flat in IR for catheter placement.    PAST HISTORY  --------------------------------------------------------------------------------  No significant changes to PMH, PSH, FHx, SHx, unless otherwise noted    ALLERGIES & MEDICATIONS  --------------------------------------------------------------------------------  Allergies    nitrofurantoin (Nephrotoxicity)    Intolerances      Standing Inpatient Medications  allopurinol 100 milliGRAM(s) Oral daily  aspirin enteric coated 81 milliGRAM(s) Oral daily  atorvastatin 40 milliGRAM(s) Oral at bedtime  cefTRIAXone   IVPB 1000 milliGRAM(s) IV Intermittent every 24 hours  chlorhexidine 2% Cloths 1 Application(s) Topical daily  dextrose 5%. 1000 milliLiter(s) IV Continuous <Continuous>  dextrose 5%. 1000 milliLiter(s) IV Continuous <Continuous>  dextrose 50% Injectable 25 Gram(s) IV Push once  dextrose 50% Injectable 12.5 Gram(s) IV Push once  dextrose 50% Injectable 25 Gram(s) IV Push once  diltiazem Infusion 10 mG/Hr IV Continuous <Continuous>  glucagon  Injectable 1 milliGRAM(s) IntraMuscular once  heparin  Infusion.  Unit(s)/Hr IV Continuous <Continuous>  hydrALAZINE Injectable 10 milliGRAM(s) IV Push four times a day  insulin lispro (ADMELOG) corrective regimen sliding scale   SubCutaneous three times a day before meals  insulin lispro (ADMELOG) corrective regimen sliding scale   SubCutaneous at bedtime  metoprolol tartrate Injectable 7.5 milliGRAM(s) IV Push every 6 hours  sevelamer carbonate 800 milliGRAM(s) Oral three times a day with meals    PRN Inpatient Medications  dextrose Oral Gel 15 Gram(s) Oral once PRN  heparin   Injectable 98893 Unit(s) IV Push every 6 hours PRN  heparin   Injectable 5000 Unit(s) IV Push every 6 hours PRN      REVIEW OF SYSTEMS  --------------------------------------------------------------------------------    Gen: denies fevers/chills,  CVS: denies chest pain/palpitations  Resp: +SOB, denies Cough  GI: decreased appetite, Denies N/V/Abd pain  : Denies dysuria    VITALS/PHYSICAL EXAM  --------------------------------------------------------------------------------  T(C): 36.6 (04-08-22 @ 07:37), Max: 36.6 (04-08-22 @ 04:00)  HR: 90 (04-08-22 @ 07:37) (61 - 99)  BP: 126/63 (04-08-22 @ 07:37) (126/63 - 159/73)  RR: 18 (04-08-22 @ 07:37) (18 - 20)  SpO2: 94% (04-08-22 @ 07:37) (93% - 96%)  Wt(kg): --  Height (cm): 180 (04-08-22 @ 07:37)  Weight (kg): 137 (04-08-22 @ 07:37)  BMI (kg/m2): 42.3 (04-08-22 @ 07:37)  BSA (m2): 2.51 (04-08-22 @ 07:37)      04-07-22 @ 07:01  -  04-08-22 @ 07:00  --------------------------------------------------------  IN: 0 mL / OUT: 1000 mL / NET: -1000 mL      Physical Exam:  	  	Gen: labored breathing, oriented x 2, on O2    	Pulm: Decreased breath sounds b/l bases. +crackles. no ronchi or wheezing  	CV: No JVD. IRR,    	Abd: +BS, soft, nontender, softly distended, obese   	: No suprapubic tenderness.               Extremity UE: increased warmth, swollen fingers B/L               Extremity LE: + hyperpigmented bilateral LE with B/L LE erythema and increased warmth, non-pitting edema, B/L LE tender on palpation              Vascular: R kariin HD cath    LABS/STUDIES  --------------------------------------------------------------------------------              11.1   11.97 >-----------<  218      [04-08-22 @ 07:12]              35.8     134  |  95  |  85  ----------------------------<  207      [04-08-22 @ 07:10]  4.9   |  20  |  7.21        Ca     8.6     [04-08-22 @ 07:10]    TPro  7.1  /  Alb  2.7  /  TBili  0.3  /  DBili  x   /  AST  12  /  ALT  6   /  AlkPhos  76  [04-08-22 @ 07:10]    PT/INR: PT 15.9 , INR 1.37       [04-08-22 @ 07:12]  PTT: 46.0       [04-08-22 @ 07:12]      Creatinine Trend:  SCr 7.21 [04-08 @ 07:10]  SCr 8.99 [04-07 @ 07:12]  SCr 6.85 [04-06 @ 03:27]  SCr 7.64 [04-05 @ 00:18]  SCr 8.51 [04-04 @ 06:27]              Urinalysis - [04-03-22 @ 00:48]      Color Light Orange / Appearance Turbid / SG 1.021 / pH 5.5      Gluc Negative / Ketone Negative  / Bili Negative / Urobili Negative       Blood Large / Protein 300 mg/dL / Leuk Est Large / Nitrite Negative      RBC 20 /  / Hyaline 10 / Gran  / Sq Epi  / Non Sq Epi 3 / Bacteria Negative    Urine Creatinine 153      [04-03-22 @ 00:48]  Urine Protein 71      [04-03-22 @ 00:48]  Urine Sodium 11      [04-03-22 @ 00:48]  Urine Chloride <20      [04-03-22 @ 00:48]    Iron 31, TIBC 243, %sat 13      [04-04-22 @ 00:17]  Ferritin 152      [04-03-22 @ 23:06]  PTH -- (Ca --)      [04-03-22 @ 23:06]   97  HbA1c 11.7      [11-07-19 @ 09:14]  TSH 1.98      [04-05-22 @ 05:19]

## 2022-04-08 NOTE — PROGRESS NOTE ADULT - ASSESSMENT
66 y/o M with PMH of pleural effusion (2019 s/p R thoracentesis, then R CT placement with course c/b R hydroPTX - tx to [facility] for possible VATs decortication which was deferred), CAD, NSTEMI s/p stents (2014 stents to LAD, proximal and distal LCx, and additional LULU to proximal LAD 2/2022), ischemic cardiomyopathy, HTN, T2DM c/b peripheral neuropathy, CVA, TIA, Afib on Pradaxa, chronic RLE wound, L carotid stenosis s/p endarterectomy (2014). Presented to ED with hematuria and decreased urine output over the past week, was prescribed Nitrofurantoin from an urgent care center. Also with increase in B/L LE edema, weight gain, and orthopnea. On admission, labs notable for creatinine of 7.44 (noted to be 2.2 in 2/2022) - urgent HD initiated 2nd to uremia. Pulmonary called to consult for mild SOB, hx of abnormal CT chest.

## 2022-04-08 NOTE — PROGRESS NOTE ADULT - ASSESSMENT
A/P    66 y/o M with history of CAD, s/p multiple PCI, with most recent 2/22 PCI of LAD in setting of NSTEMI, on ASA only (pradaxa), chronic atrial fibrillation maintained on Pradaxa, essential HTN, type 2 DM previously on oral medications but on insulin, diabetic neuropathy with chronic RIGHT LE venous stasis changes>left, LEFT foot ulcer seen by podiatry, past endarterectomy LEFT CEA in 2014 presenting with 1 week of decreased urine output, cystitis with hematuria     #ANT on CKD  -oliguric renal failure in setting of ? UTI/cystitis, r/o obstruction ? secondary to abx   -worsened with diuretic use but unlikely due to hypovolemia alone from diuretic use   -r/o bacteremia, r/o other infection ?leg cellulitis  - UA+ , Ucx noted -iv abx per id , BCX NGTD   -hyperkalemia improved   -New HD for uremia, renal failure  -renal f/u     #AMS/Lethargy  -slowly improving   -likely 2/2 Uremic encephalopathy   -ABG noted  -med f/u   -MICU eval noted 4/5    #Acute on chronic HFpEF  -stable, remains clinically overloaded but not decompensated  -repeat chest xray 4/8 with Redemonstrated bibasilar patchy opacities.  -continue volume removal with HD  -sp bumex   -not tolerating PO - c/w IVP bb     #Chronic AF  -rates elevated   -cont IVP BB  -c/w cardizem gtt   -c/w hep gtt     #HTN  -pt not tolerating PO meds  -IV hydral, bb    #CAD, s/p PCI, most recent 2/2022  -stable, cont ASA, off plavix due to  (a/c ) pradaxa indication  -statin as able     #R carotid stenosis  -cont med tx  -MRA noted with Greater than 75% stenosis of the right distal common carotid artery. Approximately 60% stenosis of the proximal left internal carotid artery.  -Continue ASA and Statin Therapy  -vasc outpt f/u Dr Pinto    ACP- Advanced Care Planning  -Advanced care planning discussed with patient. Advanced care planning forms discussed with patient and/or family.  Risks, benefits, and alternatives of medical/cardiac procedures were discussed in detail with all questions answered.  30 minutes were spent addressing advance care planning.        plan discussed with family at bedside    A/P    68 y/o M with history of CAD, s/p multiple PCI, with most recent 2/22 PCI of LAD in setting of NSTEMI, on ASA only (pradaxa), chronic atrial fibrillation maintained on Pradaxa, essential HTN, type 2 DM previously on oral medications but on insulin, diabetic neuropathy with chronic RIGHT LE venous stasis changes>left, LEFT foot ulcer seen by podiatry, past endarterectomy LEFT CEA in 2014 presenting with 1 week of decreased urine output, cystitis with hematuria     #ANT on CKD  -oliguric renal failure in setting of ? UTI/cystitis, r/o obstruction ? secondary to abx   -worsened with diuretic use but unlikely due to hypovolemia alone from diuretic use   -r/o bacteremia, r/o other infection ?leg cellulitis  - UA+ , Ucx noted -iv abx per id , BCX NGTD   -hyperkalemia improved   -New HD for uremia, renal failure  -renal f/u     #AMS/Lethargy  -slowly improving   -likely 2/2 Uremic encephalopathy   -ABG noted  -med f/u   -MICU eval noted 4/5    #Acute on chronic HFpEF  -stable, remains clinically overloaded   -repeat chest xray 4/8 with Redemonstrated bibasilar patchy opacities.  -continue volume removal with HD  -sp bumex   -not tolerating PO - c/w IVP bb     #Chronic AF  -rates elevated   -cont IVP BB  -c/w cardizem gtt   -c/w hep gtt     #HTN  -pt not tolerating PO meds  -IV hydral, bb    #CAD, s/p PCI, most recent 2/2022  -stable, cont ASA, off plavix due to  (a/c ) pradaxa indication  -statin as able     #R carotid stenosis  -cont med tx  -MRA noted with Greater than 75% stenosis of the right distal common carotid artery. Approximately 60% stenosis of the proximal left internal carotid artery.  -Continue ASA and Statin Therapy  -vasc outpt f/u Dr Pinto    ACP- Advanced Care Planning  -Advanced care planning discussed with patient. Advanced care planning forms discussed with patient and/or family.  Risks, benefits, and alternatives of medical/cardiac procedures were discussed in detail with all questions answered.  30 minutes were spent addressing advance care planning.        plan discussed with family at bedside

## 2022-04-08 NOTE — PROGRESS NOTE ADULT - SUBJECTIVE AND OBJECTIVE BOX
DATE OF SERVICE: 04-08-22 @ 12:29  CHIEF COMPLAINT:Patient is a 67y old  Male who presents with a chief complaint of Decreased urine output for the past week, leg oedema (08 Apr 2022 09:43)    	        PAST MEDICAL & SURGICAL HISTORY:  HTN (hypertension), benign    HLD (hyperlipidemia)    DM type 2 (diabetes mellitus, type 2)    TIA (transient ischemic attack)    Atrial fibrillation    MI (myocardial infarction)  circa 2014 and 2022.    CAD (coronary artery disease)    Neuropathy    Stage 3 chronic kidney disease    2019 novel coronavirus disease (COVID-19)  12/2021.  Received the COVID-19 vaccine x 2 as of April 2022.    Status post angioplasty with stent  LULU x 3 2/7/2014    S/P carotid endarterectomy  left          weak  alert  sleepy  no cp   sob on lying flat   unable to place permcath  Medications:  MEDICATIONS  (STANDING):  allopurinol 100 milliGRAM(s) Oral daily  aspirin enteric coated 81 milliGRAM(s) Oral daily  atorvastatin 40 milliGRAM(s) Oral at bedtime  cefTRIAXone   IVPB 1000 milliGRAM(s) IV Intermittent every 24 hours  chlorhexidine 2% Cloths 1 Application(s) Topical daily  dextrose 5%. 1000 milliLiter(s) (100 mL/Hr) IV Continuous <Continuous>  dextrose 5%. 1000 milliLiter(s) (50 mL/Hr) IV Continuous <Continuous>  dextrose 50% Injectable 25 Gram(s) IV Push once  dextrose 50% Injectable 12.5 Gram(s) IV Push once  dextrose 50% Injectable 25 Gram(s) IV Push once  diltiazem Infusion 10 mG/Hr (10 mL/Hr) IV Continuous <Continuous>  glucagon  Injectable 1 milliGRAM(s) IntraMuscular once  heparin  Infusion.  Unit(s)/Hr (24 mL/Hr) IV Continuous <Continuous>  hydrALAZINE Injectable 10 milliGRAM(s) IV Push four times a day  insulin lispro (ADMELOG) corrective regimen sliding scale   SubCutaneous three times a day before meals  insulin lispro (ADMELOG) corrective regimen sliding scale   SubCutaneous at bedtime  metoprolol tartrate Injectable 7.5 milliGRAM(s) IV Push every 6 hours  sevelamer carbonate 800 milliGRAM(s) Oral three times a day with meals    MEDICATIONS  (PRN):  dextrose Oral Gel 15 Gram(s) Oral once PRN Blood Glucose LESS THAN 70 milliGRAM(s)/deciliter  heparin   Injectable 23990 Unit(s) IV Push every 6 hours PRN For aPTT less than 40  heparin   Injectable 5000 Unit(s) IV Push every 6 hours PRN For aPTT between 40 - 57    	    PHYSICAL EXAM:  T(C): 36.5 (04-08-22 @ 11:20), Max: 36.6 (04-08-22 @ 04:00)  HR: 95 (04-08-22 @ 11:20) (61 - 99)  BP: 149/81 (04-08-22 @ 11:20) (126/63 - 159/73)  RR: 18 (04-08-22 @ 11:20) (18 - 20)  SpO2: 94% (04-08-22 @ 11:20) (93% - 96%)  Wt(kg): --  I&O's Summary    07 Apr 2022 07:01  -  08 Apr 2022 07:00  --------------------------------------------------------  IN: 0 mL / OUT: 1000 mL / NET: -1000 mL        Appearance: Normal	  HEENT:   Normal oral mucosa, PERRL, EOMI	    Cardiovascular: Normal S1 S2, No JVD,   Respiratory:dec bs   Gastrointestinal:  Soft, Non-tender, + BS	  Neurologic: Non-focal  Extremities:dec rom  pvd    TELEMETRY: 	    ECG:  	  RADIOLOGY:  OTHER: 	  	  LABS:	 	    CARDIAC MARKERS:                                11.1   11.97 )-----------( 218      ( 08 Apr 2022 07:12 )             35.8     04-08    134<L>  |  95<L>  |  85<H>  ----------------------------<  207<H>  4.9   |  20<L>  |  7.21<H>    Ca    8.6      08 Apr 2022 07:10    TPro  7.1  /  Alb  2.7<L>  /  TBili  0.3  /  DBili  x   /  AST  12  /  ALT  6<L>  /  AlkPhos  76  04-08    proBNP: Serum Pro-Brain Natriuretic Peptide: 99456 pg/mL (04-08 @ 07:10)    Lipid Profile:   HgA1c:   TSH:

## 2022-04-08 NOTE — PROGRESS NOTE ADULT - SUBJECTIVE AND OBJECTIVE BOX
Follow-up Pulm Progress Note    More alert today  C/o mild SOB  Sats 95% 2LNC     Medications:  MEDICATIONS  (STANDING):  allopurinol 100 milliGRAM(s) Oral daily  aspirin enteric coated 81 milliGRAM(s) Oral daily  atorvastatin 40 milliGRAM(s) Oral at bedtime  cefTRIAXone   IVPB 1000 milliGRAM(s) IV Intermittent every 24 hours  chlorhexidine 2% Cloths 1 Application(s) Topical daily  dextrose 5%. 1000 milliLiter(s) (100 mL/Hr) IV Continuous <Continuous>  dextrose 5%. 1000 milliLiter(s) (50 mL/Hr) IV Continuous <Continuous>  dextrose 50% Injectable 25 Gram(s) IV Push once  dextrose 50% Injectable 12.5 Gram(s) IV Push once  dextrose 50% Injectable 25 Gram(s) IV Push once  diltiazem Infusion 10 mG/Hr (10 mL/Hr) IV Continuous <Continuous>  glucagon  Injectable 1 milliGRAM(s) IntraMuscular once  heparin  Infusion.  Unit(s)/Hr (24 mL/Hr) IV Continuous <Continuous>  hydrALAZINE Injectable 10 milliGRAM(s) IV Push four times a day  insulin lispro (ADMELOG) corrective regimen sliding scale   SubCutaneous three times a day before meals  insulin lispro (ADMELOG) corrective regimen sliding scale   SubCutaneous at bedtime  metoprolol tartrate Injectable 7.5 milliGRAM(s) IV Push every 6 hours  sevelamer carbonate 800 milliGRAM(s) Oral three times a day with meals    MEDICATIONS  (PRN):  dextrose Oral Gel 15 Gram(s) Oral once PRN Blood Glucose LESS THAN 70 milliGRAM(s)/deciliter  heparin   Injectable 36581 Unit(s) IV Push every 6 hours PRN For aPTT less than 40  heparin   Injectable 5000 Unit(s) IV Push every 6 hours PRN For aPTT between 40 - 57          Vital Signs Last 24 Hrs  T(C): 36.5 (08 Apr 2022 11:20), Max: 36.6 (08 Apr 2022 04:00)  T(F): 97.7 (08 Apr 2022 11:20), Max: 97.9 (08 Apr 2022 04:00)  HR: 95 (08 Apr 2022 11:20) (61 - 99)  BP: 149/81 (08 Apr 2022 11:20) (126/63 - 159/73)  BP(mean): --  RR: 18 (08 Apr 2022 11:20) (18 - 20)  SpO2: 94% (08 Apr 2022 11:20) (93% - 96%)    ABG - ( 07 Apr 2022 16:22 )  pH, Arterial: 7.43  pH, Blood: x     /  pCO2: 38    /  pO2: 79    / HCO3: 25    / Base Excess: 1.0   /  SaO2: 95.8                  04-07 @ 07:01  -  04-08 @ 07:00  --------------------------------------------------------  IN: 0 mL / OUT: 1000 mL / NET: -1000 mL          LABS:                        11.1   11.97 )-----------( 218      ( 08 Apr 2022 07:12 )             35.8     04-08    134<L>  |  95<L>  |  85<H>  ----------------------------<  207<H>  4.9   |  20<L>  |  7.21<H>    Ca    8.6      08 Apr 2022 07:10    TPro  7.1  /  Alb  2.7<L>  /  TBili  0.3  /  DBili  x   /  AST  12  /  ALT  6<L>  /  AlkPhos  76  04-08          CAPILLARY BLOOD GLUCOSE  192 (07 Apr 2022 08:05)      POCT Blood Glucose.: 238 mg/dL (08 Apr 2022 12:40)    PT/INR - ( 08 Apr 2022 07:12 )   PT: 15.9 sec;   INR: 1.37 ratio         PTT - ( 08 Apr 2022 07:12 )  PTT:46.0 sec      Serum Pro-Brain Natriuretic Peptide: 78197 pg/mL (04-08-22 @ 07:10)                CULTURES:     Culture - Blood (collected 04-04-22 @ 17:46)  Source: .Blood Blood-Peripheral  Preliminary Report (04-05-22 @ 18:00):    No growth to date.    Culture - Blood (collected 04-04-22 @ 17:46)  Source: .Blood Blood-Peripheral  Preliminary Report (04-05-22 @ 18:00):    No growth to date.        Culture - Urine (collected 04-03-22 @ 04:11)  Source: Clean Catch Clean Catch (Midstream)  Final Report (04-04-22 @ 12:00):    <10,000 CFU/mL Normal Urogenital Tiffany          Physical Examination:  PULM: Decreased at bases  CVS: RRR    RADIOLOGY REVIEWED    CT chest: ct< from: CT Chest No Cont (04.04.22 @ 16:52) >    FINDINGS:    AIRWAYS, LUNGS, PLEURA: Tracheal secretions. Small left greater than   right pleural effusions increased compared to 2/19/2022. Right-sided   pleural thickening. Lingular and left greater than right basilar   opacification, likely atelectasis.    MEDIASTINUM: Cardiomegaly. No pericardial effusion. Thoracic aorta normal   caliber.  No large mediastinal lymph nodes.    IMAGED ABDOMEN: Nonspecific perinephric stranding.    SOFT TISSUES: Unremarkable.    BONES: Unremarkable.    IMPRESSION:.    Small left greater than right bilateral pleural effusions increased in   size compared to 2/19/2022.    Lingular and left greater than right basilar opacification, likely   atelectasis.    --- End of Report ---      < end of copied text >

## 2022-04-08 NOTE — PROGRESS NOTE ADULT - SUBJECTIVE AND OBJECTIVE BOX
CARDIOLOGY FOLLOW UP - Dr. Mazariegos  DATE OF SERVICE: 4/8/22     CC no cp or sob   events noted- pt unable to lay flat- desat- unable to go forward with IR for catheter placement.        REVIEW OF SYSTEMS:  CONSTITUTIONAL: No fever, weight loss, or fatigue  RESPIRATORY: No cough, wheezing, chills or hemoptysis; No Shortness of Breath  CARDIOVASCULAR: No chest pain, palpitations, passing out, dizziness, or leg swelling  GASTROINTESTINAL: No abdominal or epigastric pain. No nausea, vomiting, or hematemesis; No diarrhea or constipation. No melena or hematochezia.      PHYSICAL EXAM:  T(C): 36.3 (04-08-22 @ 14:50), Max: 36.6 (04-08-22 @ 04:00)  HR: 99 (04-08-22 @ 14:50) (86 - 99)  BP: 146/62 (04-08-22 @ 14:50) (126/63 - 159/73)  RR: 18 (04-08-22 @ 14:50) (18 - 20)  SpO2: 99% (04-08-22 @ 14:50) (93% - 99%)  Wt(kg): --  I&O's Summary    07 Apr 2022 07:01  -  08 Apr 2022 07:00  --------------------------------------------------------  IN: 0 mL / OUT: 1000 mL / NET: -1000 mL    08 Apr 2022 07:01  -  08 Apr 2022 16:19  --------------------------------------------------------  IN: 0 mL / OUT: 1500 mL / NET: -1500 mL        Appearance: lethargic but in NAD 	  Cardiovascular: Normal S1 S2, irreg   Respiratory:  diminished   Gastrointestinal:  Soft, Non-tender, + BS	  Extremities:  le edema       Home Medications:  allopurinol 100 mg oral tablet: 1 tab(s) orally once a day (03 Apr 2022 06:34)  aspirin 81 mg oral delayed release tablet: 1 tab(s) orally once a day (03 Apr 2022 06:34)  bumetanide 2 mg oral tablet: 1 tab(s) orally once a day (03 Apr 2022 06:34)  insulin glargine 100 units/mL subcutaneous solution: 25 unit(s) subcutaneous once a day (at bedtime) (03 Apr 2022 06:34)  metOLazone 5 mg oral tablet: 1 tab(s) orally 3 times a week (03 Apr 2022 06:34)  metoprolol succinate 50 mg oral tablet, extended release: 2 tab(s) orally once a day (03 Apr 2022 06:34)  NovoLOG 100 units/mL subcutaneous solution: subcutaneous 4 times a day (before meals and at bedtime) (03 Apr 2022 06:34)  NovoLOG FlexPen 100 units/mL injectable solution: 10 unit(s) subcutaneous 3 times a day (03 Apr 2022 06:34)  oxycodone-acetaminophen 5 mg-325 mg oral tablet: 1 tab(s) orally 2 times a day (03 Apr 2022 06:34)  Pradaxa 150 mg oral capsule: 1 cap(s) orally 2 times a day (03 Apr 2022 06:34)  pregabalin 100 mg oral capsule: 1 cap(s) orally 3 times a day (03 Apr 2022 06:34)      MEDICATIONS  (STANDING):  allopurinol 100 milliGRAM(s) Oral daily  aspirin enteric coated 81 milliGRAM(s) Oral daily  atorvastatin 40 milliGRAM(s) Oral at bedtime  cefTRIAXone   IVPB 1000 milliGRAM(s) IV Intermittent every 24 hours  chlorhexidine 2% Cloths 1 Application(s) Topical daily  dextrose 5%. 1000 milliLiter(s) (100 mL/Hr) IV Continuous <Continuous>  dextrose 5%. 1000 milliLiter(s) (50 mL/Hr) IV Continuous <Continuous>  dextrose 50% Injectable 25 Gram(s) IV Push once  dextrose 50% Injectable 12.5 Gram(s) IV Push once  dextrose 50% Injectable 25 Gram(s) IV Push once  diltiazem Infusion 10 mG/Hr (10 mL/Hr) IV Continuous <Continuous>  glucagon  Injectable 1 milliGRAM(s) IntraMuscular once  heparin  Infusion.  Unit(s)/Hr (24 mL/Hr) IV Continuous <Continuous>  hydrALAZINE Injectable 10 milliGRAM(s) IV Push four times a day  insulin lispro (ADMELOG) corrective regimen sliding scale   SubCutaneous three times a day before meals  insulin lispro (ADMELOG) corrective regimen sliding scale   SubCutaneous at bedtime  metoprolol tartrate Injectable 7.5 milliGRAM(s) IV Push every 6 hours  sevelamer carbonate 800 milliGRAM(s) Oral three times a day with meals      TELEMETRY:afib hr 110-150s  	    ECG:  	  RADIOLOGY:   DIAGNOSTIC TESTING:  [ ] Echocardiogram:  [ ]  Catheterization:  [ ] Stress Test:    OTHER: 	    LABS:	 	                            11.1   11.97 )-----------( 218      ( 08 Apr 2022 07:12 )             35.8     04-08    134<L>  |  95<L>  |  85<H>  ----------------------------<  207<H>  4.9   |  20<L>  |  7.21<H>    Ca    8.6      08 Apr 2022 07:10    TPro  7.1  /  Alb  2.7<L>  /  TBili  0.3  /  DBili  x   /  AST  12  /  ALT  6<L>  /  AlkPhos  76  04-08    PT/INR - ( 08 Apr 2022 07:12 )   PT: 15.9 sec;   INR: 1.37 ratio         PTT - ( 08 Apr 2022 15:17 )  PTT:57.2 sec

## 2022-04-08 NOTE — PROGRESS NOTE ADULT - ASSESSMENT
Patient is a 67 year old male with PMH of CAD with past multiple PCI, with most recent discharge from [facility] in Feb 2022 for NSTEMI with LULU of an 85% prox LAD lesion with patient on ASA and now off Plavix per cardiology (only for one month), chronic afib on Pradaxa, HTN, type 2 DM on insulin, diabetic neuropathy with chronic RIGHT LE venous stasis changes>left, LEFT foot ulcer seen by podiatry, past endarterectomy LEFT CEA in 2014 who presented with UTI with hematuria diagnosed at urgent care and now with decreased urine output.     Acute cystitis/UTI    - noted dark urine with hematuria, went to  and was prescribed macrobid, had some improvement in urine color but then noticed decreased urine output with no voiding reported in last 2 days   - UA here with pyuria -  with large LE, negative nitrites and no bacteria    - CTAP reviewed - no hydronephrosis, nephrolithiasis, or evidence of obstructive uropathy   - urine culture negative   - blood cultures negative   - continue on ceftriaxone, plan x7 day course until 4/9/22, last day tomorrow   - monitor temps/WBC    ANT on CKD3   - Nephrology following, appreciate recs   - s/p placement of shiley and initiation of HD   - monitor Cr    - renally dose meds     B/l LE edema with chronic venous stasis  Acute on chronic HFpEF   - legs cool to touch, nontender, chronic changes with no sign of infection/cellulitis   - has wound on bottom of L foot - dry and does not appear infected either   - clinically overloaded, Cardiology and Nephrology following, on IV bumex, for HD   - elevate lower extremities      DM2 with neuropathy   - Blood glucose management per primary team.    Infectious Diseases will continue to follow. Please call with any questions.   Annie King M.D.  [facility], Division of Infectious Diseases [phone]   Patient is a 67 year old male with PMH of CAD with past multiple PCI, with most recent discharge from [facility] in Feb 2022 for NSTEMI with LULU of an 85% prox LAD lesion with patient on ASA and now off Plavix per cardiology (only for one month), chronic afib on Pradaxa, HTN, type 2 DM on insulin, diabetic neuropathy with chronic RIGHT LE venous stasis changes>left, LEFT foot ulcer seen by podiatry, past endarterectomy LEFT CEA in 2014 who presented with UTI with hematuria diagnosed at urgent care and now with decreased urine output.     Acute cystitis/UTI    - noted dark urine with hematuria, went to  and was prescribed macrobid, had some improvement in urine color but then noticed decreased urine output with no voiding reported in last 2 days   - UA here with pyuria -  with large LE, negative nitrites and no bacteria    - CTAP reviewed - no hydronephrosis, nephrolithiasis, or evidence of obstructive uropathy   - urine culture negative   - blood cultures negative   - continue on ceftriaxone, plan x7 day course until 4/9/22, last day tomorrow   - monitor temps/WBC    ANT on CKD3   - Nephrology following, appreciate recs   - s/p placement of shiley and initiation of HD   - monitor Cr    - renally dose meds     B/l LE edema with chronic venous stasis  Acute on chronic HFpEF   - legs cool to touch, nontender, chronic changes with no sign of infection/cellulitis   - has wound on bottom of L foot - dry and does not appear infected either   - clinically overloaded, Cardiology and Nephrology following, on IV bumex, for HD   - elevate lower extremities      DM2 with neuropathy   - Blood glucose management per primary team.    Infectious Diseases will continue to follow. Please call with any questions.   Annie King M.D.  [facility], Division of Infectious Diseases [phone]

## 2022-04-08 NOTE — PROGRESS NOTE ADULT - SUBJECTIVE AND OBJECTIVE BOX
[facility], Division of Infectious Diseases  ADRIANA Seals Y. Patel, S. Shah, G. [facility]  [phone]    Name: DYLAN DEL CASTILLO  Age: 67y  Gender: Male  MRN: [id number]    Interval History:  Patient seen and examined at bedside this morning  No acute overnight events. Afebrile  Notes reviewed    Antibiotics:  cefTRIAXone   IVPB 1000 milliGRAM(s) IV Intermittent every 24 hours      Medications:  allopurinol 100 milliGRAM(s) Oral daily  aspirin enteric coated 81 milliGRAM(s) Oral daily  atorvastatin 40 milliGRAM(s) Oral at bedtime  cefTRIAXone   IVPB 1000 milliGRAM(s) IV Intermittent every 24 hours  chlorhexidine 2% Cloths 1 Application(s) Topical daily  dextrose 5%. 1000 milliLiter(s) IV Continuous <Continuous>  dextrose 5%. 1000 milliLiter(s) IV Continuous <Continuous>  dextrose 50% Injectable 25 Gram(s) IV Push once  dextrose 50% Injectable 12.5 Gram(s) IV Push once  dextrose 50% Injectable 25 Gram(s) IV Push once  dextrose Oral Gel 15 Gram(s) Oral once PRN  diltiazem Infusion 10 mG/Hr IV Continuous <Continuous>  glucagon  Injectable 1 milliGRAM(s) IntraMuscular once  heparin   Injectable 98722 Unit(s) IV Push every 6 hours PRN  heparin   Injectable 5000 Unit(s) IV Push every 6 hours PRN  heparin  Infusion.  Unit(s)/Hr IV Continuous <Continuous>  hydrALAZINE Injectable 10 milliGRAM(s) IV Push four times a day  insulin lispro (ADMELOG) corrective regimen sliding scale   SubCutaneous three times a day before meals  insulin lispro (ADMELOG) corrective regimen sliding scale   SubCutaneous at bedtime  metoprolol tartrate Injectable 7.5 milliGRAM(s) IV Push every 6 hours  sevelamer carbonate 800 milliGRAM(s) Oral three times a day with meals      Review of Systems:  unable to obtain    Allergies: nitrofurantoin (Nephrotoxicity)    For details regarding the patient's past medical history, social history, family history, and other miscellaneous elements, please refer the initial infectious diseases consultation and/or the admitting history and physical examination for this admission.    Objective:  Vitals:   T(C): 36.6 (04-08-22 @ 07:37), Max: 36.6 (04-08-22 @ 04:00)  HR: 90 (04-08-22 @ 07:37) (61 - 99)  BP: 126/63 (04-08-22 @ 07:37) (126/63 - 159/73)  RR: 18 (04-08-22 @ 07:37) (18 - 20)  SpO2: 94% (04-08-22 @ 07:37) (93% - 96%)    Physical Examination:  General: no acute distress  HEENT: NC/AT, EOMI  Cardio: S1, S2 heard, RRR, no murmurs  Resp: breath sounds heard bilaterally, no rales, wheezes or rhonchi  Abd: soft, NT, ND  Ext: no edema or cyanosis  Skin: warm, dry, no visible rash    Laboratory Studies:  CBC:                       11.1   11.97 )-----------( 218      ( 08 Apr 2022 07:12 )             35.8     CMP: 04-08    134<L>  |  95<L>  |  85<H>  ----------------------------<  207<H>  4.9   |  20<L>  |  7.21<H>    Ca    8.6      08 Apr 2022 07:10    TPro  7.1  /  Alb  2.7<L>  /  TBili  0.3  /  DBili  x   /  AST  12  /  ALT  6<L>  /  AlkPhos  76  04-08    LIVER FUNCTIONS - ( 08 Apr 2022 07:10 )  Alb: 2.7 g/dL / Pro: 7.1 g/dL / ALK PHOS: 76 U/L / ALT: 6 U/L / AST: 12 U/L / GGT: x               Microbiology: reviewed    Culture - Blood (collected 04-04-22 @ 17:46)  Source: .Blood Blood-Peripheral  Preliminary Report (04-05-22 @ 18:00):    No growth to date.    Culture - Blood (collected 04-04-22 @ 17:46)  Source: .Blood Blood-Peripheral  Preliminary Report (04-05-22 @ 18:00):    No growth to date.    Culture - Urine (collected 04-03-22 @ 04:11)  Source: Clean Catch Clean Catch (Midstream)  Final Report (04-04-22 @ 12:00):    <10,000 CFU/mL Normal Urogenital Tiffany        Radiology: reviewed       [facility], Division of Infectious Diseases  ADRIANA Seals Y. Patel, S. Shah, G. [facility]  [phone]    Name: DYLAN DEL CASTILLO  Age: 67y  Gender: Male  MRN: [id number]    Interval History:  Patient seen and examined at bedside this morning  wife at bedside  receiving HD  No acute overnight events. Afebrile  Notes reviewed    Antibiotics:  cefTRIAXone   IVPB 1000 milliGRAM(s) IV Intermittent every 24 hours      Medications:  allopurinol 100 milliGRAM(s) Oral daily  aspirin enteric coated 81 milliGRAM(s) Oral daily  atorvastatin 40 milliGRAM(s) Oral at bedtime  cefTRIAXone   IVPB 1000 milliGRAM(s) IV Intermittent every 24 hours  chlorhexidine 2% Cloths 1 Application(s) Topical daily  dextrose 5%. 1000 milliLiter(s) IV Continuous <Continuous>  dextrose 5%. 1000 milliLiter(s) IV Continuous <Continuous>  dextrose 50% Injectable 25 Gram(s) IV Push once  dextrose 50% Injectable 12.5 Gram(s) IV Push once  dextrose 50% Injectable 25 Gram(s) IV Push once  dextrose Oral Gel 15 Gram(s) Oral once PRN  diltiazem Infusion 10 mG/Hr IV Continuous <Continuous>  glucagon  Injectable 1 milliGRAM(s) IntraMuscular once  heparin   Injectable 51378 Unit(s) IV Push every 6 hours PRN  heparin   Injectable 5000 Unit(s) IV Push every 6 hours PRN  heparin  Infusion.  Unit(s)/Hr IV Continuous <Continuous>  hydrALAZINE Injectable 10 milliGRAM(s) IV Push four times a day  insulin lispro (ADMELOG) corrective regimen sliding scale   SubCutaneous three times a day before meals  insulin lispro (ADMELOG) corrective regimen sliding scale   SubCutaneous at bedtime  metoprolol tartrate Injectable 7.5 milliGRAM(s) IV Push every 6 hours  sevelamer carbonate 800 milliGRAM(s) Oral three times a day with meals      Review of Systems:  unable to obtain    Allergies: nitrofurantoin (Nephrotoxicity)    For details regarding the patient's past medical history, social history, family history, and other miscellaneous elements, please refer the initial infectious diseases consultation and/or the admitting history and physical examination for this admission.    Objective:  Vitals:   T(C): 36.6 (04-08-22 @ 07:37), Max: 36.6 (04-08-22 @ 04:00)  HR: 90 (04-08-22 @ 07:37) (61 - 99)  BP: 126/63 (04-08-22 @ 07:37) (126/63 - 159/73)  RR: 18 (04-08-22 @ 07:37) (18 - 20)  SpO2: 94% (04-08-22 @ 07:37) (93% - 96%)    Physical Examination:  General: no acute distress, in mod pain  HEENT: NC/AT, EOMI  Cardio: S1, S2 heard, RRR, no murmurs  Resp: decreased b/l breath sounds  Abd: soft, NT, ND  Ext: no edema or cyanosis  Skin: warm, dry, no visible rash  Lines: shiley    Laboratory Studies:  CBC:                       11.1   11.97 )-----------( 218      ( 08 Apr 2022 07:12 )             35.8     CMP: 04-08    134<L>  |  95<L>  |  85<H>  ----------------------------<  207<H>  4.9   |  20<L>  |  7.21<H>    Ca    8.6      08 Apr 2022 07:10    TPro  7.1  /  Alb  2.7<L>  /  TBili  0.3  /  DBili  x   /  AST  12  /  ALT  6<L>  /  AlkPhos  76  04-08    LIVER FUNCTIONS - ( 08 Apr 2022 07:10 )  Alb: 2.7 g/dL / Pro: 7.1 g/dL / ALK PHOS: 76 U/L / ALT: 6 U/L / AST: 12 U/L / GGT: x               Microbiology: reviewed    Culture - Blood (collected 04-04-22 @ 17:46)  Source: .Blood Blood-Peripheral  Preliminary Report (04-05-22 @ 18:00):    No growth to date.    Culture - Blood (collected 04-04-22 @ 17:46)  Source: .Blood Blood-Peripheral  Preliminary Report (04-05-22 @ 18:00):    No growth to date.    Culture - Urine (collected 04-03-22 @ 04:11)  Source: Clean Catch Clean Catch (Midstream)  Final Report (04-04-22 @ 12:00):    <10,000 CFU/mL Normal Urogenital Tiffany        Radiology: reviewed

## 2022-04-08 NOTE — PROGRESS NOTE ADULT - NS ATTEND AMEND GEN_ALL_CORE FT
pt still with uremia as well as pulm edema as well likely given could not tolerate being supine for HD cath placement   hd as planned above.   next hd in am   d/w cards and pt family

## 2022-04-09 NOTE — PROGRESS NOTE ADULT - ASSESSMENT
Patient is a 67 year old male with PMH of CAD with past multiple PCI, with most recent discharge from [place] in Feb 2022 for NSTEMI with LULU of an 85% prox LAD lesion with patient on ASA and now off Plavix per cardiology (only for one month), chronic afib on Pradaxa, HTN, type 2 DM on insulin, diabetic neuropathy with chronic RIGHT LE venous stasis changes>left, LEFT foot ulcer seen by podiatry, past endarterectomy LEFT CEA in 2014 who presented with UTI with hematuria diagnosed at urgent care and now with decreased urine output.     Acute cystitis/UTI    - noted dark urine with hematuria, went to  and was prescribed macrobid, had some improvement in urine color but then noticed decreased urine output with no voiding reported in last 2 days   - UA here with pyuria -  with large LE, negative nitrites and no bacteria    - CTAP reviewed - no hydronephrosis, nephrolithiasis, or evidence of obstructive uropathy   - urine culture negative   - blood cultures negative   - continue on ceftriaxone, plan x7 day course until 4/9/22 (last day today)   - monitor temps/WBC    ANT on CKD3   - Nephrology following, appreciate recs   - s/p placement of shiley and initiation of HD   - monitor Cr    - renally dose meds     B/l LE edema with chronic venous stasis  Acute on chronic HFpEF   - legs cool to touch, nontender, chronic changes with no sign of infection/cellulitis   - has wound on bottom of L foot - dry and does not appear infected either   - clinically overloaded, Cardiology and Nephrology following, on IV bumex, for HD   - elevate lower extremities      DM2 with neuropathy   - Blood glucose management per primary team.    Eugene Meeks M.D.  [facility], Division of Infectious Diseases  [phone]  After 5pm on weekdays and all day on weekends - please call [phone]

## 2022-04-09 NOTE — PROGRESS NOTE ADULT - SUBJECTIVE AND OBJECTIVE BOX
[facility], Division of Infectious Diseases  ADRIANA Seals Y. Patel, S. Shah, G. Casimir  [phone]  ([phone] - weekdays after 5pm and weekends)    Name: DYLAN DEL CASTILLO  Age/Gender: 67y Male  MRN: [id number]    Interval History:  Patient seen this morning.   Notes reviewed.   No concerning overnight events.  Afebrile.   wife at bedside    Allergies: nitrofurantoin (Nephrotoxicity)      Objective:  Vitals:   T(F): 98.4 (04-09-22 @ 11:10), Max: 98.4 (04-09-22 @ 11:10)  HR: 81 (04-09-22 @ 11:10) (81 - 116)  BP: 163/62 (04-09-22 @ 11:10) (146/62 - 163/62)  RR: 20 (04-09-22 @ 11:10) (18 - 20)  SpO2: 95% (04-09-22 @ 11:10) (94% - 99%)  Physical Examination:  General: no acute distress, somnolent but arousable  HEENT: anicteric  Cardio: S1, S2, normal rate  Resp: decreased breath sounds, NC  Abd: soft, ND, NT  Ext: no edema  Skin: warm, dry, b/l venous stasis dermatitis  Lines:    Laboratory Studies:  CBC:                       10.6   11.80 )-----------( 181      ( 09 Apr 2022 07:19 )             35.3     WBC Trend:  11.80 04-09-22 @ 07:19  11.97 04-08-22 @ 07:12  9.94 04-07-22 @ 07:09  8.48 04-06-22 @ 03:27  10.95 04-05-22 @ 04:54  11.26 04-05-22 @ 00:17  11.04 04-04-22 @ 06:27  10.55 04-03-22 @ 18:56  8.64 04-03-22 @ 06:59  9.47 04-03-22 @ 05:30  7.69 04-02-22 @ 18:27    CMP: 04-08    134<L>  |  95<L>  |  85<H>  ----------------------------<  207<H>  4.9   |  20<L>  |  7.21<H>    Ca    8.6      08 Apr 2022 07:10    TPro  7.1  /  Alb  2.7<L>  /  TBili  0.3  /  DBili  x   /  AST  12  /  ALT  6<L>  /  AlkPhos  76  04-08      LIVER FUNCTIONS - ( 08 Apr 2022 07:10 )  Alb: 2.7 g/dL / Pro: 7.1 g/dL / ALK PHOS: 76 U/L / ALT: 6 U/L / AST: 12 U/L / GGT: x               Microbiology: reviewed     Culture - Blood (collected 04-04-22 @ 17:46)  Source: .Blood Blood-Peripheral  Preliminary Report (04-05-22 @ 18:00):    No growth to date.    Culture - Blood (collected 04-04-22 @ 17:46)  Source: .Blood Blood-Peripheral  Preliminary Report (04-05-22 @ 18:00):    No growth to date.    Culture - Urine (collected 04-03-22 @ 04:11)  Source: Clean Catch Clean Catch (Midstream)  Final Report (04-04-22 @ 12:00):    <10,000 CFU/mL Normal Urogenital Tiffany      Radiology: reviewed     Medications:  allopurinol 100 milliGRAM(s) Oral daily  aspirin enteric coated 81 milliGRAM(s) Oral daily  atorvastatin 40 milliGRAM(s) Oral at bedtime  chlorhexidine 2% Cloths 1 Application(s) Topical daily  dextrose 5%. 1000 milliLiter(s) IV Continuous <Continuous>  dextrose 5%. 1000 milliLiter(s) IV Continuous <Continuous>  dextrose 50% Injectable 25 Gram(s) IV Push once  dextrose 50% Injectable 12.5 Gram(s) IV Push once  dextrose 50% Injectable 25 Gram(s) IV Push once  dextrose Oral Gel 15 Gram(s) Oral once PRN  diltiazem Infusion 10 mG/Hr IV Continuous <Continuous>  glucagon  Injectable 1 milliGRAM(s) IntraMuscular once  heparin   Injectable 26435 Unit(s) IV Push every 6 hours PRN  heparin   Injectable 5000 Unit(s) IV Push every 6 hours PRN  heparin  Infusion. 2200 Unit(s)/Hr IV Continuous <Continuous>  hydrALAZINE Injectable 10 milliGRAM(s) IV Push four times a day  insulin lispro (ADMELOG) corrective regimen sliding scale   SubCutaneous three times a day before meals  insulin lispro (ADMELOG) corrective regimen sliding scale   SubCutaneous at bedtime  metoprolol tartrate Injectable 7.5 milliGRAM(s) IV Push every 6 hours  sevelamer carbonate 800 milliGRAM(s) Oral three times a day with meals    Antimicrobials:

## 2022-04-09 NOTE — PROGRESS NOTE ADULT - SUBJECTIVE AND OBJECTIVE BOX
Covering for Dr. Cordova  --------------------------------------------------------------------------------  Chief Complaint:    24 hour events/subjective:    Patient seen and examined.   +SOB  unable to tolerate laying flat in IR for catheter placement. Seen on dialysis goal of 3 liters UF. Tolerating it well.     PAST HISTORY  --------------------------------------------------------------------------------  No significant changes to PMH, PSH, FHx, SHx, unless otherwise noted    ALLERGIES & MEDICATIONS  --------------------------------------------------------------------------------  Allergies    nitrofurantoin (Nephrotoxicity)    Intolerances      MEDICATIONS  (STANDING):  allopurinol 100 milliGRAM(s) Oral daily  aspirin enteric coated 81 milliGRAM(s) Oral daily  atorvastatin 40 milliGRAM(s) Oral at bedtime  chlorhexidine 2% Cloths 1 Application(s) Topical daily  dextrose 5%. 1000 milliLiter(s) (100 mL/Hr) IV Continuous <Continuous>  dextrose 5%. 1000 milliLiter(s) (50 mL/Hr) IV Continuous <Continuous>  dextrose 50% Injectable 25 Gram(s) IV Push once  dextrose 50% Injectable 12.5 Gram(s) IV Push once  dextrose 50% Injectable 25 Gram(s) IV Push once  diltiazem Infusion 10 mG/Hr (10 mL/Hr) IV Continuous <Continuous>  glucagon  Injectable 1 milliGRAM(s) IntraMuscular once  heparin  Infusion. 2200 Unit(s)/Hr (22 mL/Hr) IV Continuous <Continuous>  hydrALAZINE Injectable 10 milliGRAM(s) IV Push four times a day  insulin lispro (ADMELOG) corrective regimen sliding scale   SubCutaneous three times a day before meals  insulin lispro (ADMELOG) corrective regimen sliding scale   SubCutaneous at bedtime  metoprolol tartrate Injectable 7.5 milliGRAM(s) IV Push every 6 hours  sevelamer carbonate 800 milliGRAM(s) Oral three times a day with meals    MEDICATIONS  (PRN):  dextrose Oral Gel 15 Gram(s) Oral once PRN Blood Glucose LESS THAN 70 milliGRAM(s)/deciliter  heparin   Injectable 51802 Unit(s) IV Push every 6 hours PRN For aPTT less than 40  heparin   Injectable 5000 Unit(s) IV Push every 6 hours PRN For aPTT between 40 - 57      REVIEW OF SYSTEMS  --------------------------------------------------------------------------------    Gen: denies fevers/chills,  CVS: denies chest pain/palpitations  Resp: +SOB, denies Cough  GI: decreased appetite, Denies N/V/Abd pain  : Denies dysuria    Vital Signs Last 24 Hrs  T(C): 36.6 (09 Apr 2022 16:40), Max: 36.9 (09 Apr 2022 11:10)  T(F): 97.9 (09 Apr 2022 16:40), Max: 98.4 (09 Apr 2022 11:10)  HR: 70 (09 Apr 2022 16:40) (70 - 116)  BP: 144/56 (09 Apr 2022 16:40) (144/56 - 163/62)  RR: 20 (09 Apr 2022 16:40) (20 - 20)  SpO2: 96% (09 Apr 2022 16:40) (95% - 96%)        04-07-22 @ 07:01  -  04-08-22 @ 07:00  --------------------------------------------------------  IN: 0 mL / OUT: 1000 mL / NET: -1000 mL      Physical Exam:  	  	Gen: Resting but able to awake  	Pulm: Bilateral crackles.   	CV: Irregular rate and rhythm. No JVD. IRR,    	Abd: +BS, soft, nontender, softly distended, obese   	: No suprapubic tenderness.               Extremity UE: increased warmth, swollen fingers B/L               Extremity LE: + hyperpigmented bilateral LE with B/L LE erythema and increased warmth, non-pitting edema, B/L LE tender on palpation              Vascular: Right femoral un-tunneled shiley catheter.     LABS/STUDIES  --------------------------------------------------------------------------------                        10.6   11.80 )-----------( 181      ( 09 Apr 2022 07:19 )             35.3   04-09    136  |  96  |  80<H>  ----------------------------<  227<H>  4.8   |  24  |  6.87<H>    Ca    8.8      09 Apr 2022 16:11  Phos  8.7     04-09    TPro  6.6  /  Alb  3.0<L>  /  TBili  0.3  /  DBili  x   /  AST  10  /  ALT  <5<L>  /  AlkPhos  67  04-09         Creatinine Trend:  SCr 6.87 [04-09 @ 07:19]  SCr 7.21 [04-08 @ 07:10]  SCr 8.99 [04-07 @ 07:12]  SCr 6.85 [04-06 @ 03:27]  SCr 7.64 [04-05 @ 00:18]  SCr 8.51 [04-04 @ 06:27]      Urinalysis - [04-03-22 @ 00:48]      Color Light Orange / Appearance Turbid / SG 1.021 / pH 5.5      Gluc Negative / Ketone Negative  / Bili Negative / Urobili Negative       Blood Large / Protein 300 mg/dL / Leuk Est Large / Nitrite Negative      RBC 20 /  / Hyaline 10 / Gran  / Sq Epi  / Non Sq Epi 3 / Bacteria Negative    Urine Creatinine 153      [04-03-22 @ 00:48]  Urine Protein 71      [04-03-22 @ 00:48]  Urine Sodium 11      [04-03-22 @ 00:48]  Urine Chloride <20      [04-03-22 @ 00:48]    Iron 31, TIBC 243, %sat 13      [04-04-22 @ 00:17]  Ferritin 152      [04-03-22 @ 23:06]  PTH -- (Ca --)      [04-03-22 @ 23:06]   97  HbA1c 11.7      [11-07-19 @ 09:14]  TSH 1.98      [04-05-22 @ 05:19]

## 2022-04-09 NOTE — PROGRESS NOTE ADULT - ASSESSMENT
Mr. Stevens is a 67 year old gentleman with PMH of CAD, NSTEMI s/p 3 stents in 2014 (stents to LAD, proximal and distal LCx), ischemic cardiomyopathy, HTN for 10 years, T2DM for 26 years c/b peripheral neuropathy, CVA, TIA, Afib on Pradaxa, chronic RLE wound, L carotid stenosis s/p endarterectomy (2014). He just went to [facility] due to hematuria and was put on Nitrofurantoin. Pt is still taking Nitrofurantoin w/ 5 days left. Pt came to the ED due to worsening symptoms. Nephrology consulted for ANT on CKD stage 3.     1. ANT in the setting of toxic ATN versus AIN from taking Nitrofurantoin. Now on hemodialysis first dialysis was on 4/03/2022. Now on hemodialysis  2. CKD stage 3 with serum creatinine baseline Scr between 2.0 to 2.3 mg/d.   3. Iron deficiency anemia.    4. High anion gap metabolic acidosis.   5. Cardiomyopathy defer to primary  6. HFpEF2. Volume overloaded.    7. Secondary hyperparathyroidism.   8. Confusion. Possible dialysis dysequilibrium. Today is 6th Hemodialysis.       RECOMMEND:  HD today  F180, 210 min, , , 3 L of fluid removal attempting.   see HD flowsheet  Renvela of 800 mg po TID with meals.   Strict intake and output  BMP, CBC, and phosphorus in am  Monday is for IR for permacath.       Thank you for involving Nephrology in this patient's care.

## 2022-04-09 NOTE — PROGRESS NOTE ADULT - SUBJECTIVE AND OBJECTIVE BOX
CARDIOLOGY FOLLOW UP NOTE - DR. SAGASTUME    Patient Name: DYLAN DEL CASTILLO  Date of Service: 22    Patient seen and examined  more awake  not eating     family at bedside      Subjective:    cv: denies chest pain, dyspnea, palpitations, dizziness  pulmonary: denies cough  GI: denies abdominal pain, nausea, vomiting  vascular/legs: no edema   skin: no rash  ROS: otherwise negative   overnight events:      PHYSICAL EXAM:  T(C): 36.9 (22 @ 11:10), Max: 36.9 (22 @ 11:10)  HR: 81 (22 @ 11:10) (81 - 116)  BP: 163/62 (22 @ 11:10) (146/62 - 163/62)  RR: 20 (22 @ 11:10) (18 - 20)  SpO2: 95% (22 @ 11:10) (95% - 99%)  Wt(kg): --  I&O's Summary    2022 07:01  -  2022 07:00  --------------------------------------------------------  IN: 120 mL / OUT: 1500 mL / NET: -1380 mL      Daily     Daily Weight in k (2022 14:50)    Appearance: Normal	  Cardiovascular: Normal S1 S2, irreg  Respiratory: Lungs clear to auscultation	  Gastrointestinal:  Soft, Non-tender, + BS	  Extremities: Normal range of motion, edema b/l      Home Medications:  allopurinol 100 mg oral tablet: 1 tab(s) orally once a day (2022 06:34)  aspirin 81 mg oral delayed release tablet: 1 tab(s) orally once a day (2022 06:34)  bumetanide 2 mg oral tablet: 1 tab(s) orally once a day (2022 06:34)  insulin glargine 100 units/mL subcutaneous solution: 25 unit(s) subcutaneous once a day (at bedtime) (2022 06:34)  metOLazone 5 mg oral tablet: 1 tab(s) orally 3 times a week (2022 06:34)  metoprolol succinate 50 mg oral tablet, extended release: 2 tab(s) orally once a day (2022 06:34)  NovoLOG 100 units/mL subcutaneous solution: subcutaneous 4 times a day (before meals and at bedtime) (2022 06:34)  NovoLOG FlexPen 100 units/mL injectable solution: 10 unit(s) subcutaneous 3 times a day (2022 06:34)  oxycodone-acetaminophen 5 mg-325 mg oral tablet: 1 tab(s) orally 2 times a day (2022 06:34)  Pradaxa 150 mg oral capsule: 1 cap(s) orally 2 times a day (2022 06:34)  pregabalin 100 mg oral capsule: 1 cap(s) orally 3 times a day (2022 06:34)      MEDICATIONS  (STANDING):  allopurinol 100 milliGRAM(s) Oral daily  aspirin enteric coated 81 milliGRAM(s) Oral daily  atorvastatin 40 milliGRAM(s) Oral at bedtime  chlorhexidine 2% Cloths 1 Application(s) Topical daily  dextrose 5%. 1000 milliLiter(s) (100 mL/Hr) IV Continuous <Continuous>  dextrose 5%. 1000 milliLiter(s) (50 mL/Hr) IV Continuous <Continuous>  dextrose 50% Injectable 25 Gram(s) IV Push once  dextrose 50% Injectable 12.5 Gram(s) IV Push once  dextrose 50% Injectable 25 Gram(s) IV Push once  diltiazem Infusion 10 mG/Hr (10 mL/Hr) IV Continuous <Continuous>  glucagon  Injectable 1 milliGRAM(s) IntraMuscular once  heparin  Infusion. 2200 Unit(s)/Hr (22 mL/Hr) IV Continuous <Continuous>  hydrALAZINE Injectable 10 milliGRAM(s) IV Push four times a day  insulin lispro (ADMELOG) corrective regimen sliding scale   SubCutaneous three times a day before meals  insulin lispro (ADMELOG) corrective regimen sliding scale   SubCutaneous at bedtime  metoprolol tartrate Injectable 7.5 milliGRAM(s) IV Push every 6 hours  sevelamer carbonate 800 milliGRAM(s) Oral three times a day with meals      TELEMETRY: 	    ECG:  	  RADIOLOGY:   DIAGNOSTIC TESTING:  [ ] Echocardiogram:  [ ] Catheterization:  [ ] Stress Test:    OTHER: 	    LABS:	 	    CARDIAC MARKERS:                                      10.6   11.80 )-----------( 181      ( 2022 07:19 )             35.3         134<L>  |  95<L>  |  85<H>  ----------------------------<  207<H>  4.9   |  20<L>  |  7.21<H>    Ca    8.6      2022 07:10    TPro  7.1  /  Alb  2.7<L>  /  TBili  0.3  /  DBili  x   /  AST  12  /  ALT  6<L>  /  AlkPhos  76  04-08    proBNP:   PT/INR - ( 2022 07:12 )   PT: 15.9 sec;   INR: 1.37 ratio         PTT - ( 2022 07:24 )  PTT:94.7 sec  Lipid Profile:   HgA1c:     Creatinine, Serum: 7.21 mg/dL (22 @ 07:10)  Creatinine, Serum: 8.99 mg/dL (22 @ 07:12)

## 2022-04-09 NOTE — PROGRESS NOTE ADULT - SUBJECTIVE AND OBJECTIVE BOX
Patient is a 67y old  Male who presents with a chief complaint of Decreased urine output for the past week, leg oedema (09 Apr 2022 16:53)    Coverage for Dr. Mendoza Corral   SUBJECTIVE / OVERNIGHT EVENTS: Comfortable Family at bedside.   Review of Systems  chest pain no  palpitations no  sob no  nausea no  headache no    MEDICATIONS  (STANDING):  allopurinol 100 milliGRAM(s) Oral daily  aspirin enteric coated 81 milliGRAM(s) Oral daily  atorvastatin 40 milliGRAM(s) Oral at bedtime  chlorhexidine 2% Cloths 1 Application(s) Topical daily  dextrose 5%. 1000 milliLiter(s) (100 mL/Hr) IV Continuous <Continuous>  dextrose 5%. 1000 milliLiter(s) (50 mL/Hr) IV Continuous <Continuous>  dextrose 50% Injectable 25 Gram(s) IV Push once  dextrose 50% Injectable 12.5 Gram(s) IV Push once  dextrose 50% Injectable 25 Gram(s) IV Push once  diltiazem Infusion 10 mG/Hr (10 mL/Hr) IV Continuous <Continuous>  glucagon  Injectable 1 milliGRAM(s) IntraMuscular once  heparin  Infusion. 2200 Unit(s)/Hr (22 mL/Hr) IV Continuous <Continuous>  hydrALAZINE Injectable 10 milliGRAM(s) IV Push four times a day  insulin lispro (ADMELOG) corrective regimen sliding scale   SubCutaneous three times a day before meals  insulin lispro (ADMELOG) corrective regimen sliding scale   SubCutaneous at bedtime  mannitol 20% IVPB 12.5 Gram(s) IV Intermittent once  metoprolol tartrate Injectable 7.5 milliGRAM(s) IV Push every 6 hours  sevelamer carbonate 800 milliGRAM(s) Oral three times a day with meals    MEDICATIONS  (PRN):  dextrose Oral Gel 15 Gram(s) Oral once PRN Blood Glucose LESS THAN 70 milliGRAM(s)/deciliter  heparin   Injectable 16700 Unit(s) IV Push every 6 hours PRN For aPTT less than 40  heparin   Injectable 5000 Unit(s) IV Push every 6 hours PRN For aPTT between 40 - 57      Vital Signs Last 24 Hrs  T(C): 36.6 (09 Apr 2022 16:40), Max: 36.9 (09 Apr 2022 11:10)  T(F): 97.9 (09 Apr 2022 16:40), Max: 98.4 (09 Apr 2022 11:10)  HR: 70 (09 Apr 2022 16:40) (70 - 116)  BP: 144/56 (09 Apr 2022 16:40) (144/56 - 163/62)  BP(mean): --  RR: 20 (09 Apr 2022 16:40) (20 - 20)  SpO2: 96% (09 Apr 2022 16:40) (95% - 96%)    PHYSICAL EXAM:  GENERAL: NAD   HEAD:  Atraumatic, Normocephalic  EYES: EOMI, PERRLA, conjunctiva and sclera clear  NECK: Supple, No JVD  CHEST/LUNG: Clear to auscultation bilaterally; No wheeze  HEART: Regular rate and rhythm; No murmurs, rubs, or gallops  ABDOMEN: Soft, Nontender, Nondistended; Bowel sounds present  EXTREMITIES:  2+ Peripheral Pulses, No clubbing, cyanosis, or edema  PSYCH: AAOx3  NEUROLOGY: non-focal  SKIN: No rashes or lesions    LABS:                        10.6   11.80 )-----------( 181      ( 09 Apr 2022 07:19 )             35.3     04-09    136  |  96  |  80<H>  ----------------------------<  227<H>  4.8   |  24  |  6.87<H>    Ca    8.8      09 Apr 2022 16:11  Phos  8.7     04-09    TPro  6.6  /  Alb  3.0<L>  /  TBili  0.3  /  DBili  x   /  AST  10  /  ALT  <5<L>  /  AlkPhos  67  04-09    PT/INR - ( 08 Apr 2022 07:12 )   PT: 15.9 sec;   INR: 1.37 ratio         PTT - ( 09 Apr 2022 16:11 )  PTT:90.8 sec            RADIOLOGY & ADDITIONAL TESTS:    Imaging Personally Reviewed:    Consultant(s) Notes Reviewed:      Care Discussed with Consultants/Other Providers:

## 2022-04-09 NOTE — PROGRESS NOTE ADULT - ASSESSMENT
A/P    68 y/o M with history of CAD, s/p multiple PCI, with most recent 2/22 PCI of LAD in setting of NSTEMI, on ASA only (pradaxa), chronic atrial fibrillation maintained on Pradaxa, essential HTN, type 2 DM previously on oral medications but on insulin, diabetic neuropathy with chronic RIGHT LE venous stasis changes>left, LEFT foot ulcer seen by podiatry, past endarterectomy LEFT CEA in 2014 presenting with 1 week of decreased urine output, cystitis with hematuria     #ANT on CKD, new HD  -oliguric renal failure in setting of ? UTI/cystitis, r/o obstruction ? secondary to abx   -worsened with diuretic use but unlikely due to hypovolemia alone from diuretic use   -hyperkalemia improved   -New HD for uremia, renal failure  -renal f/u     #AMS/Lethargy  -slowly improving   -likely 2/2 Uremic encephalopathy   -ABG noted  -med f/u   -MICU eval noted 4/5  -would check Head CT to r/o CVA, ICH in setting of ams, a/c     #Acute on chronic HFpEF  -remains clinically overloaded   -repeat chest xray 4/8 with Redemonstrated bibasilar patchy opacities.  -continue aggressive volume removal with HD  -not tolerating PO - c/w IVP bb     #Chronic AF  -rates improved  -cont IVP BB  -c/w cardizem gtt   -c/w hep gtt     #HTN  -pt not tolerating PO meds  -IV hydral, bb    #CAD, s/p PCI, most recent 2/2022  -stable, cont ASA, off plavix due to  (a/c ) pradaxa indication  -statin as able     #R carotid stenosis  -cont med tx  -MRA noted with Greater than 75% stenosis of the right distal common carotid artery. Approximately 60% stenosis of the proximal left internal carotid artery.  -Continue ASA and Statin Therapy  -vasc outpt f/u Dr Pinto    ACP- Advanced Care Planning  -Advanced care planning discussed with patient. Advanced care planning forms discussed with patient and/or family.  Risks, benefits, and alternatives of medical/cardiac procedures were discussed in detail with all questions answered.  30 minutes were spent addressing advance care planning.        plan discussed with family at bedside       45 minutes spent on total encounter; more than 50% of the visit was spent counseling and/or coordinating care by the attending physician.

## 2022-04-09 NOTE — PROGRESS NOTE ADULT - ASSESSMENT
·  Problem: Acute kidney injury superimposed on CKD. pt currently on hd  to cont as per renal  perm cath on monday with more fluid removal  pulm / cards f.u    Lethargy  - CT head no acute event       Problem/Plan - 2:  ·  Problem: Chronic atrial fibrillation. c/w a/c  cards f/u     Problem/Plan - 3:  ·  Problem: Cystitis.   treatment as per id     Problem/Plan - 4:  ·  Problem: Type 2 diabetes mellitus. c/w insulin   endo f/ui     Problem/Plan - 5:  ·  Problem: CAD (coronary artery disease).   likely volume overload contributing to resp status  .   Presently clinically stable.    Continue present Rx      discussed w/ patient and family at bedside    Javier Arce MD phone [phone]

## 2022-04-09 NOTE — CHART NOTE - NSCHARTNOTEFT_GEN_A_CORE
HPI:  NIGHT HOSPITALIST:   Patient remotely known to me from an admission to [place] in Feb 2017--assigned to me at this point by the ER to admit this 66 y/o M--followed by his nephrologist above--patient with a history of CAD with past multiple PCI, with most recent discharge from [place] in Feb 2022 for non ST elevation MI with LULU of an 85% prox LAD lesion with patient on ASA and now off Plavix per cardiology (only for one month), chronic atrial fibrillation maintained on Pradaxa, essential HTN, type 2 DM previously on oral medications but on insulin, diabetic neuropathy with chronic RIGHT LE venous stasis changes>left, LEFT foot ulcer seen by podiatry, past endarterectomy LEFT CEA in 2014. with patient self referring to the ER following apparently treatment by an urgent care center for an apparent cystitis with hematuria on nitrofurantoin but with patient noting decreased urine output for the past week, and difficulty with B/L coarse tremors for several weeks, with patient having difficulty negotiating his applications on his smart phone (observed by examiner).  Patient with increasing B/L LE oedema and weight gain, with patient with 2 pillow orthopnoea. Medicine NP chart note:  HPI:  68 y/o M with a history of CAD with past multiple PCI, with most recent discharge from [place] in Feb 2022 for non ST elevation MI with LULU of an 85% prox LAD lesion with patient on ASA and now off Plavix per cardiology (only for one month), chronic atrial fibrillation maintained on Pradaxa, essential HTN, type 2 DM previously on oral medications but on insulin, diabetic neuropathy with chronic RIGHT LE venous stasis changes>left, LEFT foot ulcer seen by podiatry, past endarterectomy LEFT CEA in 2014. Came to ER following treatment by an urgent care center for an apparent cystitis with hematuria on nitrofurantoin but with patient noting decreased urine output for the past week, and difficulty with B/L coarse tremors for several weeks.  Patient with increasing B/L LE oedema and weight gain, with patient with 2 pillow orthopnoea.    Notified by RN patient c/o generalized pain.  Patient seen at bedside.  Patient endorsing generalized pain and frustration.  As per patient he feels he is going to die since he hasn't been out of bed in days. Provided emotional support to patient and wife.  Offered to call a psychology consultation to help patient deal with feelings of hopelessness due to his current condition. Patient and wife declined.  Patient does not endorse SI.  Patient and wife requesting Tylenol for generalized pain. Will order Orifmev x1 and continue to provide emotional support to patient and family.     Will f/u with attending and day team in the AM.     Emelin Reyes [place], [facility]  Medicine Department   Spectralink [phone]

## 2022-04-10 NOTE — PROGRESS NOTE ADULT - ASSESSMENT
·  Problem: Acute kidney injury superimposed on CKD. pt currently on hd  to cont as per renal  perm cath on monday with more fluid removal  pulm / cards f.u    Lethargy  - CT head no acute event       Problem/Plan - 2:  ·  Problem: Chronic atrial fibrillation. c/w a/c  cards f/u     Problem/Plan - 3:  ·  Problem: Cystitis.   treatment as per id     Problem/Plan - 4:  ·  Problem: Type 2 diabetes mellitus. c/w insulin   endo f/ui     Problem/Plan - 5:  ·  Problem: CAD (coronary artery disease).   likely volume overload contributing to resp status  .   Presently clinically stable.    Continue present Rx    discussed w/ patient and wife     Javier Arce MD phone [phone]

## 2022-04-10 NOTE — CONSULT NOTE ADULT - ASSESSMENT
HPI:   67 year old gentleman pmhx CAD s/p MI/PCI/CABG, hx TIA, afib on pradaxa, HTN, DM2, PVD, gout, L CEA 2014, CKD admitted to ED with ANT, tremors, confusion and lethargy. Receiving new hemodialysis. CT head no acute changes. Mental status improved today. Pt denies any headaches, no slurred speech, no focal facial or limb weakness. Has chronic gout with bilateral finger swelling, pain.     A/P: Presentation consistent with toxic metabolic encephalopathy secondary to ANT/CKD improving with dialysis, CT head no acute changes, no focal findings on exam, no evidence of acute neurological event at this time.     Plan  Continue HD as per renal  Continue supportive care per primary team  Continue stroke prophylaxis- on AC for afib, antiplatelet, statin, optimize hypertension/glycemic control  Neurochecks  No further inpatient neurological tests at this time.   Will follow.   Plan reviewed with pt at bedside.

## 2022-04-10 NOTE — CONSULT NOTE ADULT - SUBJECTIVE AND OBJECTIVE BOX
Admitting Diagnosis:  Chronic kidney disease [N18.9]  CHRONIC KIDNEY DISEASE, UNSPECIFIED    HPI:  This is a 67y year old gentleman pmhx CAD s/p MI/PCI/CABG, hx TIA, afib on pradaxa, HTN, DM2, PVD, gout, L CEA 2014, CKD admitted to ED with ANT, tremors, confusion and lethargy. Receiving new hemodialysis.   CT head no acute changes. Mental status improved today. Pt denies any headaches, no slurred speech, no focal facial or limb weakness. Has chronic gout with bilateral finger swelling, pain.     ****CHART HPI:  HPI:  NIGHT HOSPITALIST:   Patient remotely known to me from an admission to [place] in Feb 2017--assigned to me at this point by the ER to admit this 68 y/o M--followed by his nephrologist above--patient with a history of CAD with past multiple PCI, with most recent discharge from [place] in Feb 2022 for non ST elevation MI with LULU of an 85% prox LAD lesion with patient on ASA and now off Plavix per cardiology (only for one month), chronic atrial fibrillation maintained on Pradaxa, essential HTN, type 2 DM previously on oral medications but on insulin, diabetic neuropathy with chronic RIGHT LE venous stasis changes>left, LEFT foot ulcer seen by podiatry, past endarterectomy LEFT CEA in 2014. with patient self referring to the ER following apparently treatment by an urgent care center for an apparent cystitis with hematuria on nitrofurantoin but with patient noting decreased urine output for the past week, and difficulty with B/L coarse tremors for several weeks, with patient having difficulty negotiating his applications on his smart phone (observed by examiner).  Patient with increasing B/L LE oedema and weight gain, with patient with 2 pillow orthopnoea.      Patient was recommended for a [place] but patient refused, patient wishing to review the issue with his daughter.    Patient with past history of COVID-19 in 12/2021 and has received the COVID-19 vaccine x 2.    NO fever, no chills, no rigors.  No diaphoresis.  No back pain, no tearing back pain. (03 Apr 2022 06:29)  ******    Past Medical History:  HTN (hypertension), benign [401.1]  HLD (hyperlipidemia) [272.4]  DM type 2 (diabetes mellitus, type 2) [250.00]  TIA (transient ischemic attack) [435.9]  Atrial fibrillation [427.31]  MI (myocardial infarction) [410.90]  circa 2014 and 2022.  CAD (coronary artery disease) [414.00]  Neuropathy [G62.9]  Stage 3 chronic kidney disease [N18.30]  2019 novel coronavirus disease (COVID-19) [U07.1]  12/2021.  Received the COVID-19 vaccine x 2 as of April 2022.    Past Surgical History:  Status post angioplasty with stent [V45.82]  LULU x 3 2/7/2014  S/P carotid endarterectomy [Z98.89]  left  S/P CABG (coronary artery bypass graft) [Z95.1]        Social History:  No toxic habits    Family History:  FAMILY HISTORY:  Family history of heart disease  father    Family history of CVA  mother    Allergies:  nitrofurantoin (Nephrotoxicity)    ROS: as per HPI.  Advanced care planning reviewed and noted in the chart.    Medications:  allopurinol 100 milliGRAM(s) Oral daily  aspirin enteric coated 81 milliGRAM(s) Oral daily  atorvastatin 40 milliGRAM(s) Oral at bedtime  chlorhexidine 2% Cloths 1 Application(s) Topical daily  dextrose 5%. 1000 milliLiter(s) IV Continuous <Continuous>  dextrose 5%. 1000 milliLiter(s) IV Continuous <Continuous>  dextrose 50% Injectable 25 Gram(s) IV Push once  dextrose 50% Injectable 12.5 Gram(s) IV Push once  dextrose 50% Injectable 25 Gram(s) IV Push once  dextrose Oral Gel 15 Gram(s) Oral once PRN  diltiazem Infusion 10 mG/Hr IV Continuous <Continuous>  glucagon  Injectable 1 milliGRAM(s) IntraMuscular once  heparin   Injectable 24226 Unit(s) IV Push every 6 hours PRN  heparin   Injectable 5000 Unit(s) IV Push every 6 hours PRN  heparin  Infusion. 2200 Unit(s)/Hr IV Continuous <Continuous>  hydrALAZINE Injectable 10 milliGRAM(s) IV Push four times a day  insulin lispro (ADMELOG) corrective regimen sliding scale   SubCutaneous three times a day before meals  insulin lispro (ADMELOG) corrective regimen sliding scale   SubCutaneous at bedtime  metoprolol tartrate Injectable 7.5 milliGRAM(s) IV Push every 6 hours  sevelamer carbonate 800 milliGRAM(s) Oral three times a day with meals    Labs:  CBC Full  -  ( 10 Apr 2022 07:29 )  WBC Count : 12.17 K/uL  RBC Count : 4.01 M/uL  Hemoglobin : 10.2 g/dL  Hematocrit : 33.2 %  Platelet Count - Automated : 175 K/uL  Mean Cell Volume : 82.8 fl  Mean Cell Hemoglobin : 25.4 pg  Mean Cell Hemoglobin Concentration : 30.7 gm/dL  Auto Neutrophil # : x  Auto Lymphocyte # : x  Auto Monocyte # : x  Auto Eosinophil # : x  Auto Basophil # : x  Auto Neutrophil % : x  Auto Lymphocyte % : x  Auto Monocyte % : x  Auto Eosinophil % : x  Auto Basophil % : x    04-10    136  |  96  |  48<H>  ----------------------------<  172<H>  4.3   |  25  |  4.71<H>    Ca    8.6      10 Apr 2022 07:29  Phos  8.7     04-09    TPro  6.6  /  Alb  3.0<L>  /  TBili  0.3  /  DBili  x   /  AST  10  /  ALT  <5<L>  /  AlkPhos  67  04-09    POCT Blood Glucose.: 168 mg/dL (10 Apr 2022 08:02)  POCT Blood Glucose.: 158 mg/dL (09 Apr 2022 21:46)  POCT Blood Glucose.: 185 mg/dL (09 Apr 2022 17:24)  POCT Blood Glucose.: 213 mg/dL (09 Apr 2022 12:13)    LIVER FUNCTIONS - ( 09 Apr 2022 16:11 )  Alb: 3.0 g/dL / Pro: 6.6 g/dL / ALK PHOS: 67 U/L / ALT: <5 U/L / AST: 10 U/L / GGT: x           PTT - ( 10 Apr 2022 07:32 )  PTT:65.0 sec    Vitals:  Vital Signs Last 24 Hrs  T(C): 36.7 (10 Apr 2022 11:36), Max: 36.8 (09 Apr 2022 20:11)  T(F): 98 (10 Apr 2022 11:36), Max: 98.3 (09 Apr 2022 20:11)  HR: 107 (10 Apr 2022 11:36) (70 - 110)  BP: 133/73 (10 Apr 2022 11:36) (131/75 - 169/68)  BP(mean): --  RR: 19 (10 Apr 2022 04:30) (19 - 20)  SpO2: 95% (10 Apr 2022 11:36) (94% - 96%)    NEUROLOGICAL EXAM:    Mental status: Awake, alert, and in no apparent distress. Oriented to self, [facility], April, 2022, [place]. Speech clear and fluent, names well, follows commands, no RL confusion, able to perform complex calculations.     Cranial Nerves: Pupils were equal, round, reactive to light. Extraocular movements were intact. B BTT. Fundoscopic exam was deferred. Facial sensation was intact to light touch. There was no facial asymmetry. The palate was upgoing symmetrically and tongue was midline. Hearing acuity was intact to finger rub AU. Shoulder shrug was full bilaterally    Motor exam: Bulk and tone were normal. Strength was 5/5 in all four extremities. LE limited by marked swelling/PVD. Fine finger movements were symmetric and normal. There was no pronator drift    Reflexes: DTRs depressed, flexor plantars.     Sensation: Intact to light touch, temperature.    Coordination: Finger-nose-finger intact.   Gait: deferred    Imaging:    ACC: [id number] EXAM:  CT BRAIN                        PROCEDURE DATE:  04/09/2022      INTERPRETATION:  CLINICAL INFORMATION:  Altered mental status, on   anticoagulation .    TECHNIQUE: Multiple contiguous axial images were acquired from the   skullbase to the vertex without the administration of intravenous   contrast.  Coronal and sagittal reformations were made.    COMPARISON: CT head 10/21/2021, brain MRI 02/23/2014.    FINDINGS:    Scattered lacunar infarcts in the bilateral cerebralhemispheres are   grossly stable compared to the 10/21/2021 head CT. No new additional   infarcts are noted over the time interval.    No acute intracranial hemorrhage, mass effect, or midline shift. The   brain demonstrates periventricular hypoattenuation, nonspecific in   etiology but likely representing chronic microvascular ischemic changes.    No abnormal extra-axial fluid collections are present.    The ventricles and sulci demonstrate age appropriate involutional   changes.  The basal cisterns are patent.    The orbits are unremarkable. The paranasal sinuses are clear. The mastoid   air cells are clear. The calvarium is intact.    IMPRESSION:    No acute intracranial abnormality is noted. If the patient has new and   persistent symptoms, consider short interval follow-up head CT or brain   MRI.    --- End of Report ---    GATITO VILLARREAL MD; Resident Radiologist  This document has been electronically signed.  JESSE CORONA MD; Attending Radiologist  This document has been electronically signed. Apr 9 2022  2:00PM

## 2022-04-10 NOTE — PROGRESS NOTE ADULT - ASSESSMENT
A/P    66 y/o M with history of CAD, s/p multiple PCI, with most recent 2/22 PCI of LAD in setting of NSTEMI, on ASA only (pradaxa), chronic atrial fibrillation maintained on Pradaxa, essential HTN, type 2 DM previously on oral medications but on insulin, diabetic neuropathy with chronic RIGHT LE venous stasis changes>left, LEFT foot ulcer seen by podiatry, past endarterectomy LEFT CEA in 2014 presenting with 1 week of decreased urine output, cystitis with hematuria     #ANT on CKD, new HD  -oliguric renal failure in setting of ? UTI/cystitis, r/o obstruction ? secondary to abx   -worsened with diuretic use but unlikely due to hypovolemia alone from diuretic use   -hyperkalemia improved   -New HD for uremia, renal failure  -renal f/u   -await permacath     #AMS/Lethargy  -improving   -likely 2/2 Uremic encephalopathy   -ABG noted  -med f/u   -MICU eval noted 4/5  -head ct ok    #Acute on chronic HFpEF  -remains overloaded but improved  -continue aggressive volume removal with HD  -not tolerating PO - c/w IVP bb     #Chronic AF  -rates improved  -cont IVP BB, c/w cardizem gtt   -c/w hep gtt   -change to po bb, ccb once tolerates ok    #HTN  -pt not tolerating PO meds  -IV hydral, bb    #CAD, s/p PCI, most recent 2/2022  -stable, cont ASA, off plavix due to  (a/c ) pradaxa indication  -statin as able     #R carotid stenosis  -cont med tx  -MRA noted with Greater than 75% stenosis of the right distal common carotid artery. Approximately 60% stenosis of the proximal left internal carotid artery.  -Continue ASA and Statin Therapy  -vasc outpt f/u Dr Pinto    ACP- Advanced Care Planning  -Advanced care planning discussed with patient. Advanced care planning forms discussed with patient and/or family.  Risks, benefits, and alternatives of medical/cardiac procedures were discussed in detail with all questions answered.  30 minutes were spent addressing advance care planning.        plan discussed with family at bedside       45 minutes spent on total encounter; more than 50% of the visit was spent counseling and/or coordinating care by the attending physician.

## 2022-04-10 NOTE — PROGRESS NOTE ADULT - ASSESSMENT
67 year old gentleman with PMH of CAD, NSTEMI s/p 3 stents in 2014 (stents to LAD, proximal and distal LCx), ischemic cardiomyopathy, HTN for 10 years, T2DM for 26 years c/b peripheral neuropathy, CVA, TIA, Afib on Pradaxa, chronic RLE wound, L carotid stenosis s/p endarterectomy (2014) just recently admitted  to the [facility] on 2/19/2022 with acute onset chest pain for one day found to have an NSTEMI, CAD, s/p PCI in setting of increased AF rates off bb for 3 days and resolved chest pain, his CKMB peaked and Echo noted with EF 60%,normal LV function, mild TR, mild NH. He was s/p [facility] with 85% proximal LAD s/p balloon angioplasty and LULU. He presented to [facility] ED with decreased urine output over the week. Mr. Stevens went to [facility] for hematuria and was started  on Nitrofurantoin. Pt is still taking Nitrofurantoin w/ 5 days left. He came to the ED due to worsening symptoms. Pt reports having a similar incident 4-5 years ago that resolved spontaneously. He reports increased leg edema Dyspnea worse on exertion. his baseline Serum creatinine is in the 2.0 to 2.3 mg/dl range. ANT with serum creatinine of 8.29 with bun 144 and k 5.5        1- ANT on ckd III   2- chf chronic   3- hyperkalemia  on hd   4- uremia improving   5- hyperphosphatemia         ANT likely due to ATN however etiology of his ATN unclear ? due to infection recently vs other etiologies   I am unclear if renal biopsy will help improve the outcome if underlying dx is clarified given this pt with hx ckd with significant hx of chf requiring high doses of diuretics outpt. in addition given need of AC higher bleeding complications risk   however will obtain serologies for now to see if underlying dx may be different based on serological findings   to have c3/c4/yari anca anti gbm spep/ipep bence jones  d/w cards as well   d/w pt wife at bedside  cont renvela with meals but increase to 2 tab with meals

## 2022-04-10 NOTE — PROGRESS NOTE ADULT - SUBJECTIVE AND OBJECTIVE BOX
[facility]   [phone]  RENAL FOLLOW UP NOTE  --------------------------------------------------------------------------------  Chief Complaint:    24 hour events/subjective:    seen earlier   wife at bedside.   pt responsive   states breathing is better     PAST HISTORY  --------------------------------------------------------------------------------  No significant changes to PMH, PSH, FHx, SHx, unless otherwise noted    ALLERGIES & MEDICATIONS  --------------------------------------------------------------------------------  Allergies    nitrofurantoin (Nephrotoxicity)    Intolerances      Standing Inpatient Medications  allopurinol 100 milliGRAM(s) Oral daily  aspirin enteric coated 81 milliGRAM(s) Oral daily  atorvastatin 40 milliGRAM(s) Oral at bedtime  chlorhexidine 2% Cloths 1 Application(s) Topical daily  dextrose 5%. 1000 milliLiter(s) IV Continuous <Continuous>  dextrose 5%. 1000 milliLiter(s) IV Continuous <Continuous>  dextrose 50% Injectable 12.5 Gram(s) IV Push once  dextrose 50% Injectable 25 Gram(s) IV Push once  dextrose 50% Injectable 25 Gram(s) IV Push once  diltiazem Infusion 10 mG/Hr IV Continuous <Continuous>  glucagon  Injectable 1 milliGRAM(s) IntraMuscular once  heparin  Infusion. 2200 Unit(s)/Hr IV Continuous <Continuous>  hydrALAZINE Injectable 10 milliGRAM(s) IV Push four times a day  insulin lispro (ADMELOG) corrective regimen sliding scale   SubCutaneous three times a day before meals  insulin lispro (ADMELOG) corrective regimen sliding scale   SubCutaneous at bedtime  metoprolol tartrate Injectable 7.5 milliGRAM(s) IV Push every 6 hours  sevelamer carbonate 800 milliGRAM(s) Oral three times a day with meals    PRN Inpatient Medications  dextrose Oral Gel 15 Gram(s) Oral once PRN  heparin   Injectable 65834 Unit(s) IV Push every 6 hours PRN  heparin   Injectable 5000 Unit(s) IV Push every 6 hours PRN      REVIEW OF SYSTEMS  --------------------------------------------------------------------------------    Gen: hector,  CVS: denies chest pain/palpitations  Resp: denies worsening SOB/Cough  GI: Denies N/V/Abd pain  : Denies dysuria      VITALS/PHYSICAL EXAM  --------------------------------------------------------------------------------  T(C): 36.6 (04-10-22 @ 16:59), Max: 36.8 (04-09-22 @ 20:11)  HR: 92 (04-10-22 @ 16:59) (74 - 110)  BP: 161/76 (04-10-22 @ 16:59) (131/75 - 169/68)  RR: 18 (04-10-22 @ 16:59) (18 - 20)  SpO2: 94% (04-10-22 @ 16:59) (94% - 95%)  Wt(kg): --        04-09-22 @ 07:01  -  04-10-22 @ 07:00  --------------------------------------------------------  IN: 120 mL / OUT: 3000 mL / NET: -2880 mL    04-10-22 @ 07:01  -  04-10-22 @ 17:48  --------------------------------------------------------  IN: 480 mL / OUT: 0 mL / NET: 480 mL      Physical Exam:  	    	  	Gen: comfortable appearing , oriented x  3, on O2    	Pulm: Decreased breath sounds b/l bases.  no crackles. no ronchi or wheezing  	CV: No JVD. IRR,    	Abd: +BS, soft, nontender, softly distended, obese   	: No suprapubic tenderness.               Extremity UE: increased warmth, swollen fingers B/L               Extremity LE: + hyperpigmented bilateral LE with B/L non-pitting edema, B/L LE tender on palpation              Vascular: R cat HD cath      LABS/STUDIES  --------------------------------------------------------------------------------              10.2   12.17 >-----------<  175      [04-10-22 @ 07:29]              33.2     136  |  96  |  48  ----------------------------<  172      [04-10-22 @ 07:29]  4.3   |  25  |  4.71        Ca     8.6     [04-10-22 @ 07:29]      Phos  8.7     [04-09-22 @ 16:11]    TPro  6.6  /  Alb  3.0  /  TBili  0.3  /  DBili  x   /  AST  10  /  ALT  <5  /  AlkPhos  67  [04-09-22 @ 16:11]      PTT: 65.0       [04-10-22 @ 07:32]      Creatinine Trend:  SCr 4.71 [04-10 @ 07:29]  SCr 6.87 [04-09 @ 16:11]  SCr 7.21 [04-08 @ 07:10]  SCr 8.99 [04-07 @ 07:12]  SCr 6.85 [04-06 @ 03:27]              Urinalysis - [04-03-22 @ 00:48]      Color Light Orange / Appearance Turbid / SG 1.021 / pH 5.5      Gluc Negative / Ketone Negative  / Bili Negative / Urobili Negative       Blood Large / Protein 300 mg/dL / Leuk Est Large / Nitrite Negative      RBC 20 /  / Hyaline 10 / Gran  / Sq Epi  / Non Sq Epi 3 / Bacteria Negative      Iron 31, TIBC 243, %sat 13      [04-04-22 @ 00:17]  Ferritin 152      [04-03-22 @ 23:06]  PTH -- (Ca --)      [04-03-22 @ 23:06]   97  HbA1c 11.7      [11-07-19 @ 09:14]  TSH 1.98      [04-05-22 @ 05:19]

## 2022-04-10 NOTE — PROGRESS NOTE ADULT - SUBJECTIVE AND OBJECTIVE BOX
Patient is a 67y old  Male who presents with a chief complaint of Decreased urine output for the past week, leg oedema (10 Apr 2022 14:35)    Coverage for Dr. Mendoza Corral   SUBJECTIVE / OVERNIGHT EVENTS: Comfortable Wife at bedside.  Review of Systems  chest pain no  palpitations no  sob no  nausea no  headache no    MEDICATIONS  (STANDING):  allopurinol 100 milliGRAM(s) Oral daily  aspirin enteric coated 81 milliGRAM(s) Oral daily  atorvastatin 40 milliGRAM(s) Oral at bedtime  chlorhexidine 2% Cloths 1 Application(s) Topical daily  dextrose 5%. 1000 milliLiter(s) (100 mL/Hr) IV Continuous <Continuous>  dextrose 5%. 1000 milliLiter(s) (50 mL/Hr) IV Continuous <Continuous>  dextrose 50% Injectable 25 Gram(s) IV Push once  dextrose 50% Injectable 12.5 Gram(s) IV Push once  dextrose 50% Injectable 25 Gram(s) IV Push once  diltiazem Infusion 10 mG/Hr (10 mL/Hr) IV Continuous <Continuous>  glucagon  Injectable 1 milliGRAM(s) IntraMuscular once  heparin  Infusion. 2200 Unit(s)/Hr (22 mL/Hr) IV Continuous <Continuous>  hydrALAZINE Injectable 10 milliGRAM(s) IV Push four times a day  insulin lispro (ADMELOG) corrective regimen sliding scale   SubCutaneous three times a day before meals  insulin lispro (ADMELOG) corrective regimen sliding scale   SubCutaneous at bedtime  metoprolol tartrate Injectable 7.5 milliGRAM(s) IV Push every 6 hours  sevelamer carbonate 800 milliGRAM(s) Oral three times a day with meals    MEDICATIONS  (PRN):  dextrose Oral Gel 15 Gram(s) Oral once PRN Blood Glucose LESS THAN 70 milliGRAM(s)/deciliter  heparin   Injectable 08012 Unit(s) IV Push every 6 hours PRN For aPTT less than 40  heparin   Injectable 5000 Unit(s) IV Push every 6 hours PRN For aPTT between 40 - 57      Vital Signs Last 24 Hrs  T(C): 36.7 (10 Apr 2022 11:36), Max: 36.8 (09 Apr 2022 20:11)  T(F): 98 (10 Apr 2022 11:36), Max: 98.3 (09 Apr 2022 20:11)  HR: 107 (10 Apr 2022 11:36) (70 - 110)  BP: 133/73 (10 Apr 2022 11:36) (131/75 - 169/68)  BP(mean): --  RR: 19 (10 Apr 2022 04:30) (19 - 20)  SpO2: 95% (10 Apr 2022 11:36) (94% - 96%)    PHYSICAL EXAM:  GENERAL: NAD, well-developed  HEAD:  Atraumatic, Normocephalic  EYES: EOMI, PERRLA, conjunctiva and sclera clear  NECK: Supple, No JVD  CHEST/LUNG: Clear to auscultation bilaterally; No wheeze  HEART: Regular rate and rhythm; No murmurs, rubs, or gallops  ABDOMEN: Soft, Nontender, Nondistended; Bowel sounds present  EXTREMITIES:  2+ Peripheral Pulses, No clubbing, cyanosis, or edema  PSYCH: AAOx3  NEUROLOGY: non-focal  SKIN: No rashes or lesions    LABS:                        10.2   12.17 )-----------( 175      ( 10 Apr 2022 07:29 )             33.2     04-10    136  |  96  |  48<H>  ----------------------------<  172<H>  4.3   |  25  |  4.71<H>    Ca    8.6      10 Apr 2022 07:29  Phos  8.7     04-09    TPro  6.6  /  Alb  3.0<L>  /  TBili  0.3  /  DBili  x   /  AST  10  /  ALT  <5<L>  /  AlkPhos  67  04-09    PTT - ( 10 Apr 2022 07:32 )  PTT:65.0 sec            RADIOLOGY & ADDITIONAL TESTS:    Imaging Personally Reviewed:    Consultant(s) Notes Reviewed:      Care Discussed with Consultants/Other Providers:

## 2022-04-10 NOTE — PROGRESS NOTE ADULT - SUBJECTIVE AND OBJECTIVE BOX
CARDIOLOGY FOLLOW UP NOTE - DR. SAGASTUME    Patient Name: DYLAN DEL CASTILLO  Date of Service: 04-10-22 @ 14:35    Patient seen and examined  now more awake  feels better    Subjective:    cv: denies chest pain, dyspnea, palpitations, dizziness  pulmonary: denies cough  GI: denies abdominal pain, nausea, vomiting  vascular/legs: no edema   skin: no rash  ROS: otherwise negative   overnight events:      PHYSICAL EXAM:  T(C): 36.7 (04-10-22 @ 11:36), Max: 36.8 (22 @ 20:11)  HR: 107 (04-10-22 @ 11:36) (70 - 110)  BP: 133/73 (04-10-22 @ 11:36) (131/75 - 169/68)  RR: 19 (04-10-22 @ 04:30) (19 - 20)  SpO2: 95% (04-10-22 @ 11:36) (94% - 96%)  Wt(kg): --  I&O's Summary    2022 07:  -  10 Apr 2022 07:00  --------------------------------------------------------  IN: 120 mL / OUT: 3000 mL / NET: -2880 mL    10 Apr 2022 07:01  -  10 Apr 2022 14:35  --------------------------------------------------------  IN: 480 mL / OUT: 0 mL / NET: 480 mL      Daily     Daily Weight in k.7 (2022 20:11)    Appearance: Normal	  Cardiovascular: Normal S1 S2,RRR, No JVD, No murmurs  Respiratory: Lungs clear to auscultation	  Gastrointestinal:  Soft, Non-tender, + BS	  Extremities: Normal range of motion, No clubbing, cyanosis or edema      Home Medications:  allopurinol 100 mg oral tablet: 1 tab(s) orally once a day (2022 06:34)  aspirin 81 mg oral delayed release tablet: 1 tab(s) orally once a day (2022 06:34)  bumetanide 2 mg oral tablet: 1 tab(s) orally once a day (:34)  insulin glargine 100 units/mL subcutaneous solution: 25 unit(s) subcutaneous once a day (at bedtime) (2022 06:34)  metOLazone 5 mg oral tablet: 1 tab(s) orally 3 times a week (:34)  metoprolol succinate 50 mg oral tablet, extended release: 2 tab(s) orally once a day (2022 06:34)  NovoLOG 100 units/mL subcutaneous solution: subcutaneous 4 times a day (before meals and at bedtime) (2022 06:34)  NovoLOG FlexPen 100 units/mL injectable solution: 10 unit(s) subcutaneous 3 times a day (2022 06:34)  oxycodone-acetaminophen 5 mg-325 mg oral tablet: 1 tab(s) orally 2 times a day (:34)  Pradaxa 150 mg oral capsule: 1 cap(s) orally 2 times a day (2022 06:34)  pregabalin 100 mg oral capsule: 1 cap(s) orally 3 times a day (2022 06:34)      MEDICATIONS  (STANDING):  allopurinol 100 milliGRAM(s) Oral daily  aspirin enteric coated 81 milliGRAM(s) Oral daily  atorvastatin 40 milliGRAM(s) Oral at bedtime  chlorhexidine 2% Cloths 1 Application(s) Topical daily  dextrose 5%. 1000 milliLiter(s) (50 mL/Hr) IV Continuous <Continuous>  dextrose 5%. 1000 milliLiter(s) (100 mL/Hr) IV Continuous <Continuous>  dextrose 50% Injectable 12.5 Gram(s) IV Push once  dextrose 50% Injectable 25 Gram(s) IV Push once  dextrose 50% Injectable 25 Gram(s) IV Push once  diltiazem Infusion 10 mG/Hr (10 mL/Hr) IV Continuous <Continuous>  glucagon  Injectable 1 milliGRAM(s) IntraMuscular once  heparin  Infusion. 2200 Unit(s)/Hr (22 mL/Hr) IV Continuous <Continuous>  hydrALAZINE Injectable 10 milliGRAM(s) IV Push four times a day  insulin lispro (ADMELOG) corrective regimen sliding scale   SubCutaneous three times a day before meals  insulin lispro (ADMELOG) corrective regimen sliding scale   SubCutaneous at bedtime  metoprolol tartrate Injectable 7.5 milliGRAM(s) IV Push every 6 hours  sevelamer carbonate 800 milliGRAM(s) Oral three times a day with meals      TELEMETRY: 	    ECG:  	  RADIOLOGY:   DIAGNOSTIC TESTING:  [ ] Echocardiogram:  [ ] Catheterization:  [ ] Stress Test:    OTHER: 	    LABS:	 	    CARDIAC MARKERS:                                      10.2   12.17 )-----------( 175      ( 10 Apr 2022 07:29 )             33.2     04-10    136  |  96  |  48<H>  ----------------------------<  172<H>  4.3   |  25  |  4.71<H>    Ca    8.6      10 Apr 2022 07:29  Phos  8.7         TPro  6.6  /  Alb  3.0<L>  /  TBili  0.3  /  DBili  x   /  AST  10  /  ALT  <5<L>  /  AlkPhos  67      proBNP:   PTT - ( 10 Apr 2022 07:32 )  PTT:65.0 sec  Lipid Profile:   HgA1c:     Creatinine, Serum: 4.71 mg/dL (04-10-22 @ 07:29)  Creatinine, Serum: 6.87 mg/dL (22 @ 16:11)  Creatinine, Serum: 7.21 mg/dL (22 @ 07:10)

## 2022-04-11 NOTE — PROGRESS NOTE ADULT - SUBJECTIVE AND OBJECTIVE BOX
[facility], Division of Infectious Diseases  ADRIANA Seals Y. Patel, S. Shah, G. [facility]  [phone]    Name: DYLAN DEL CASTILLO  Age: 67y  Gender: Male  MRN: [id number]    Interval History:  Patient seen and examined at bedside this morning  No acute overnight events. Afebrile  Wife at bedside  Feeling lousy. More alert than usual today  Notes reviewed    Antibiotics:      Medications:  allopurinol 100 milliGRAM(s) Oral daily  aspirin enteric coated 81 milliGRAM(s) Oral daily  atorvastatin 40 milliGRAM(s) Oral at bedtime  chlorhexidine 2% Cloths 1 Application(s) Topical daily  dextrose 5%. 1000 milliLiter(s) IV Continuous <Continuous>  dextrose 5%. 1000 milliLiter(s) IV Continuous <Continuous>  dextrose 50% Injectable 25 Gram(s) IV Push once  dextrose 50% Injectable 12.5 Gram(s) IV Push once  dextrose 50% Injectable 25 Gram(s) IV Push once  dextrose Oral Gel 15 Gram(s) Oral once PRN  diltiazem Infusion 10 mG/Hr IV Continuous <Continuous>  glucagon  Injectable 1 milliGRAM(s) IntraMuscular once  heparin   Injectable 23354 Unit(s) IV Push every 6 hours PRN  heparin   Injectable 5000 Unit(s) IV Push every 6 hours PRN  heparin  Infusion. 2200 Unit(s)/Hr IV Continuous <Continuous>  hydrALAZINE Injectable 10 milliGRAM(s) IV Push four times a day  insulin lispro (ADMELOG) corrective regimen sliding scale   SubCutaneous three times a day before meals  insulin lispro (ADMELOG) corrective regimen sliding scale   SubCutaneous at bedtime  metoprolol tartrate Injectable 7.5 milliGRAM(s) IV Push every 6 hours  sevelamer carbonate 1600 milliGRAM(s) Oral three times a day with meals      Review of Systems:  A 10-point review of systems was obtained.   Review of systems otherwise negative except as previously noted.    Allergies: nitrofurantoin (Nephrotoxicity)    For details regarding the patient's past medical history, social history, family history, and other miscellaneous elements, please refer the initial infectious diseases consultation and/or the admitting history and physical examination for this admission.    Objective:  Vitals:   T(C): 36.6 (04-11-22 @ 05:38), Max: 36.7 (04-10-22 @ 11:36)  HR: 90 (04-11-22 @ 05:38) (79 - 107)  BP: 182/70 (04-11-22 @ 05:38) (133/73 - 182/70)  RR: 18 (04-11-22 @ 05:38) (18 - 18)  SpO2: 96% (04-11-22 @ 05:38) (94% - 99%)    Physical Examination:  General: no acute distress  HEENT: NC/AT, EOMI  Neck: supple, no palpable LAD  Cardio: S1, S2 heard, RRR, no murmurs  Resp: decreased b/l breath sounds  Abd: soft, NT, ND  Ext: no edema or cyanosis  Skin: warm, dry  Lines: R kariin magalie      Laboratory Studies:  CBC:                       10.1   14.00 )-----------( 217      ( 11 Apr 2022 07:02 )             33.7     CMP: 04-11    133<L>  |  92<L>  |  71<H>  ----------------------------<  225<H>  4.6   |  22  |  6.42<H>    Ca    9.0      11 Apr 2022 07:07  Phos  8.7     04-09    TPro  6.6  /  Alb  3.0<L>  /  TBili  0.3  /  DBili  x   /  AST  10  /  ALT  <5<L>  /  AlkPhos  67  04-09    LIVER FUNCTIONS - ( 09 Apr 2022 16:11 )  Alb: 3.0 g/dL / Pro: 6.6 g/dL / ALK PHOS: 67 U/L / ALT: <5 U/L / AST: 10 U/L / GGT: x               Microbiology: reviewed    Culture - Blood (collected 04-04-22 @ 17:46)  Source: .Blood Blood-Peripheral  Final Report (04-09-22 @ 18:00):    No Growth Final    Culture - Blood (collected 04-04-22 @ 17:46)  Source: .Blood Blood-Peripheral  Final Report (04-09-22 @ 18:00):    No Growth Final    Culture - Urine (collected 04-03-22 @ 04:11)  Source: Clean Catch Clean Catch (Midstream)  Final Report (04-04-22 @ 12:00):    <10,000 CFU/mL Normal Urogenital Tiffany        Radiology: reviewed

## 2022-04-11 NOTE — PROGRESS NOTE ADULT - NS ATTEND AMEND GEN_ALL_CORE FT
to have serologies as above and if significantly + then will re evaluate a renal bx. for now hd as above and IR to change femoral to hd cath IJ.

## 2022-04-11 NOTE — PROGRESS NOTE ADULT - ASSESSMENT
A/P    66 y/o M with history of CAD, s/p multiple PCI, with most recent 2/22 PCI of LAD in setting of NSTEMI, on ASA only (pradaxa), chronic atrial fibrillation maintained on Pradaxa, essential HTN, type 2 DM previously on oral medications but on insulin, diabetic neuropathy with chronic RIGHT LE venous stasis changes>left, LEFT foot ulcer seen by podiatry, past endarterectomy LEFT CEA in 2014 presenting with 1 week of decreased urine output, cystitis with hematuria     #ANT on CKD, new HD  -oliguric renal failure in setting of ? UTI/cystitis, r/o obstruction ? secondary to abx   -worsened with diuretic use but unlikely due to hypovolemia alone from diuretic use   -hyperkalemia improved   -New HD for uremia, renal failure  -renal f/u   -await HD access bob    #AMS/Lethargy  -sig improved post hd   -likely 2/2 Uremic encephalopathy   -ABG noted  -med f/u   -MICU eval noted 4/5  -head ct ok    #Acute on chronic HFpEF  -remains overloaded but improved  -continue aggressive volume removal with HD  -now tolerating po   -change to po bb    #Chronic AF  -rates improved  -100mg metoprolol succ qd tonight, d/c iv p bb after dosing of po  -dilt cd 120mg in am   -d/c iv dilt drip after hd access placed  -c/w hep gtt     #HTN  -po metoprolol  -po dilt bob  -hydral po bob    #CAD, s/p PCI, most recent 2/2022  -stable, cont ASA, off plavix due to a/c indication  -statin as able     #R carotid stenosis  -cont med tx  -MRA noted with Greater than 75% stenosis of the right distal common carotid artery. Approximately 60% stenosis of the proximal left internal carotid artery.  -Continue ASA and Statin Therapy  -vasc outpt f/u Dr Pinto    ACP- Advanced Care Planning  -Advanced care planning discussed with patient. Advanced care planning forms discussed with patient and/or family.  Risks, benefits, and alternatives of medical/cardiac procedures were discussed in detail with all questions answered.  30 minutes were spent addressing advance care planning.        plan discussed with family at bedside     d/w acp       45 minutes spent on total encounter; more than 50% of the visit was spent counseling and/or coordinating care by the attending physician.

## 2022-04-11 NOTE — PROGRESS NOTE ADULT - SUBJECTIVE AND OBJECTIVE BOX
DATE OF SERVICE: 04-11-22 @ 12:28  CHIEF COMPLAINT:Patient is a 67y old  Male who presents with a chief complaint of Decreased urine output for the past week, leg oedema (11 Apr 2022 11:41)    	        PAST MEDICAL & SURGICAL HISTORY:  HTN (hypertension), benign    HLD (hyperlipidemia)    DM type 2 (diabetes mellitus, type 2)    TIA (transient ischemic attack)    Atrial fibrillation    MI (myocardial infarction)  circa 2014 and 2022.    CAD (coronary artery disease)    Neuropathy    Stage 3 chronic kidney disease    2019 novel coronavirus disease (COVID-19)  12/2021.  Received the COVID-19 vaccine x 2 as of April 2022.    Status post angioplasty with stent  LULU x 3 2/7/2014    S/P carotid endarterectomy  left            REVIEW OF SYSTEMS:  feels better  RESPIRATORY: No cough, wheezing, chills or hemoptysis;  breathing improved  CARDIOVASCULAR: No chest pain, palpitations, passing out,  GASTROINTESTINAL: No abdominal or epigastric pain. No nausea, vomiting, or hematemesis; No diarrhea or constipation. No melena or hematochezia.  GENITOURINARY: No dysuria, frequency, hematuria, or incontinence  NEUROLOGICAL: No headaches,   less pain    Medications:  MEDICATIONS  (STANDING):  allopurinol 100 milliGRAM(s) Oral daily  aspirin enteric coated 81 milliGRAM(s) Oral daily  atorvastatin 40 milliGRAM(s) Oral at bedtime  chlorhexidine 2% Cloths 1 Application(s) Topical daily  dextrose 5%. 1000 milliLiter(s) (100 mL/Hr) IV Continuous <Continuous>  dextrose 5%. 1000 milliLiter(s) (50 mL/Hr) IV Continuous <Continuous>  dextrose 50% Injectable 25 Gram(s) IV Push once  dextrose 50% Injectable 12.5 Gram(s) IV Push once  dextrose 50% Injectable 25 Gram(s) IV Push once  diltiazem Infusion 10 mG/Hr (10 mL/Hr) IV Continuous <Continuous>  glucagon  Injectable 1 milliGRAM(s) IntraMuscular once  heparin  Infusion. 2200 Unit(s)/Hr (22 mL/Hr) IV Continuous <Continuous>  hydrALAZINE Injectable 10 milliGRAM(s) IV Push four times a day  insulin lispro (ADMELOG) corrective regimen sliding scale   SubCutaneous three times a day before meals  insulin lispro (ADMELOG) corrective regimen sliding scale   SubCutaneous at bedtime  metoprolol tartrate Injectable 7.5 milliGRAM(s) IV Push every 6 hours  sevelamer carbonate 1600 milliGRAM(s) Oral three times a day with meals    MEDICATIONS  (PRN):  dextrose Oral Gel 15 Gram(s) Oral once PRN Blood Glucose LESS THAN 70 milliGRAM(s)/deciliter  heparin   Injectable 98409 Unit(s) IV Push every 6 hours PRN For aPTT less than 40  heparin   Injectable 5000 Unit(s) IV Push every 6 hours PRN For aPTT between 40 - 57  heparin   Injectable 34515 Unit(s) IV Push every 6 hours PRN For aPTT less than 40    	    PHYSICAL EXAM:  T(C): 36.6 (04-11-22 @ 11:33), Max: 36.6 (04-10-22 @ 16:59)  HR: 82 (04-11-22 @ 11:33) (79 - 92)  BP: 165/67 (04-11-22 @ 11:33) (142/57 - 182/70)  RR: 18 (04-11-22 @ 11:33) (18 - 18)  SpO2: 96% (04-11-22 @ 11:33) (94% - 99%)  Wt(kg): --  I&O's Summary    10 Apr 2022 07:01  -  11 Apr 2022 07:00  --------------------------------------------------------  IN: 890 mL / OUT: 0 mL / NET: 890 mL    11 Apr 2022 07:01  -  11 Apr 2022 12:28  --------------------------------------------------------  IN: 240 mL / OUT: 0 mL / NET: 240 mL        Appearance: Normal	  HEENT:   Normal oral mucosa, PERRL, EOMI	    Cardiovascular: Normal S1 S2, No JVD,   Respiratory: Lungs clear to auscultation	  Psychiatry: A & O  Gastrointestinal:  Soft, Non-tender, + BS	  	  Neurologic: Non-focal  Extremities: pvd    TELEMETRY: 	    ECG:  	  RADIOLOGY:  OTHER: 	  	  LABS:	 	    CARDIAC MARKERS:                                10.1   14.00 )-----------( 217      ( 11 Apr 2022 07:02 )             33.7     04-11    133<L>  |  92<L>  |  71<H>  ----------------------------<  225<H>  4.6   |  22  |  6.42<H>    Ca    9.0      11 Apr 2022 07:07  Phos  8.7     04-09    TPro  6.6  /  Alb  3.0<L>  /  TBili  0.3  /  DBili  x   /  AST  10  /  ALT  <5<L>  /  AlkPhos  67  04-09    proBNP:   Lipid Profile:   HgA1c:   TSH:

## 2022-04-11 NOTE — PROGRESS NOTE ADULT - ASSESSMENT
·  Problem: Acute kidney injury superimposed on CKD. pt currently on hd  to cont as per renal  perm cath likely in am   pulm / cards f.u    Lethargy improved  - CT head no acute event       Problem/Plan - 2:  ·  Problem: Chronic atrial fibrillation. c/w a/c  cards f/u     Problem/Plan - 3:  ·  Problem: Cystitis.   treatment as per id  s/p abs     Problem/Plan - 4:  ·  Problem: Type 2 diabetes mellitus. c/w insulin   endo f/u     Problem/Plan - 5:  ·  Problem: CAD (coronary artery disease).   likely volume overload contributing to resp status  .   Presently clinically stable.

## 2022-04-11 NOTE — CHART NOTE - NSCHARTNOTEFT_GEN_A_CORE
IR Re-Consult: Non-tunneled HD catheter placement  Assessment/Plan: 67 year old male with ANT on CKD currently with function femoral HD catheter referred to IR for IJ non-tunneled HD catheter placement. Patient originally scheduled for catheter placement on 4/8 however patient noted to be lethargic and unable to tolerate lying flat (desaturates).   As per NP, patient is planned for HD today for fluid removal which will further optimize him for procedure tomorrow, 4/12    - case reviewed and approved for Tuesday, 4/12  - please place IR procedure order under NP Jean  - STAT labs in AM (cbc,coags, bmp, T&S)  - hold heparin gtt on call to IR  - does not need to be NPO  - COVID PCR results within 5 days of planned procedure  - d/w primary team    SUSAN Inman-BC  Available on Teams

## 2022-04-11 NOTE — PROGRESS NOTE ADULT - SUBJECTIVE AND OBJECTIVE BOX
[facility]   [phone]  RENAL FOLLOW UP NOTE  --------------------------------------------------------------------------------  Chief Complaint:    24 hour events/subjective:    patient seen and examined with Dr. Cordova  more alert today.   decreased appetite     PAST HISTORY  --------------------------------------------------------------------------------  No significant changes to PMH, PSH, FHx, SHx, unless otherwise noted    ALLERGIES & MEDICATIONS  --------------------------------------------------------------------------------  Allergies    nitrofurantoin (Nephrotoxicity)    Intolerances      Standing Inpatient Medications  allopurinol 100 milliGRAM(s) Oral daily  aspirin enteric coated 81 milliGRAM(s) Oral daily  atorvastatin 40 milliGRAM(s) Oral at bedtime  chlorhexidine 2% Cloths 1 Application(s) Topical daily  dextrose 5%. 1000 milliLiter(s) IV Continuous <Continuous>  dextrose 5%. 1000 milliLiter(s) IV Continuous <Continuous>  dextrose 50% Injectable 25 Gram(s) IV Push once  dextrose 50% Injectable 12.5 Gram(s) IV Push once  dextrose 50% Injectable 25 Gram(s) IV Push once  diltiazem Infusion 10 mG/Hr IV Continuous <Continuous>  glucagon  Injectable 1 milliGRAM(s) IntraMuscular once  heparin  Infusion. 2200 Unit(s)/Hr IV Continuous <Continuous>  hydrALAZINE Injectable 10 milliGRAM(s) IV Push four times a day  insulin lispro (ADMELOG) corrective regimen sliding scale   SubCutaneous three times a day before meals  insulin lispro (ADMELOG) corrective regimen sliding scale   SubCutaneous at bedtime  metoprolol tartrate Injectable 7.5 milliGRAM(s) IV Push every 6 hours  sevelamer carbonate 1600 milliGRAM(s) Oral three times a day with meals    PRN Inpatient Medications  dextrose Oral Gel 15 Gram(s) Oral once PRN  heparin   Injectable 88815 Unit(s) IV Push every 6 hours PRN  heparin   Injectable 5000 Unit(s) IV Push every 6 hours PRN      REVIEW OF SYSTEMS  --------------------------------------------------------------------------------    Gen: denies chills,  CVS: denies chest pain/palpitations  Resp: denies worsening SOB/Cough  GI: Denies N/V/Abd pain  : Denies dysuria      VITALS/PHYSICAL EXAM  --------------------------------------------------------------------------------  T(C): 36.6 (04-11-22 @ 11:33), Max: 36.6 (04-10-22 @ 16:59)  HR: 82 (04-11-22 @ 11:33) (79 - 92)  BP: 165/67 (04-11-22 @ 11:33) (142/57 - 182/70)  RR: 18 (04-11-22 @ 11:33) (18 - 18)  SpO2: 96% (04-11-22 @ 11:33) (94% - 99%)  Wt(kg): --        04-10-22 @ 07:01  -  04-11-22 @ 07:00  --------------------------------------------------------  IN: 890 mL / OUT: 0 mL / NET: 890 mL    04-11-22 @ 07:01  -  04-11-22 @ 11:41  --------------------------------------------------------  IN: 240 mL / OUT: 0 mL / NET: 240 mL      Physical Exam:  	  	Gen: Appears comfortable, on O2, answering questions    	Pulm: Decreased breath sounds b/l bases. no rales ronchi or wheezing  	CV: No JVD. IRR,    	Abd: +BS, soft, nontender, softly distended, obese   	: No suprapubic tenderness.               Extremity UE: increased warmth, swollen fingers B/L               Extremity LE: + hyperpigmented bilateral LE with B/L non-pitting edema, B/L LE tender on palpation              Vascular: R cat HD cath    LABS/STUDIES  --------------------------------------------------------------------------------              10.1   14.00 >-----------<  217      [04-11-22 @ 07:02]              33.7     133  |  92  |  71  ----------------------------<  225      [04-11-22 @ 07:07]  4.6   |  22  |  6.42        Ca     9.0     [04-11-22 @ 07:07]      Phos  8.7     [04-09-22 @ 16:11]    TPro  6.6  /  Alb  3.0  /  TBili  0.3  /  DBili  x   /  AST  10  /  ALT  <5  /  AlkPhos  67  [04-09-22 @ 16:11]      PTT: 71.9       [04-11-22 @ 07:02]      Creatinine Trend:  SCr 6.42 [04-11 @ 07:07]  SCr 4.71 [04-10 @ 07:29]  SCr 6.87 [04-09 @ 16:11]  SCr 7.21 [04-08 @ 07:10]  SCr 8.99 [04-07 @ 07:12]              Urinalysis - [04-03-22 @ 00:48]      Color Light Orange / Appearance Turbid / SG 1.021 / pH 5.5      Gluc Negative / Ketone Negative  / Bili Negative / Urobili Negative       Blood Large / Protein 300 mg/dL / Leuk Est Large / Nitrite Negative      RBC 20 /  / Hyaline 10 / Gran  / Sq Epi  / Non Sq Epi 3 / Bacteria Negative      Iron 31, TIBC 243, %sat 13      [04-04-22 @ 00:17]  Ferritin 152      [04-03-22 @ 23:06]  PTH -- (Ca --)      [04-03-22 @ 23:06]   97  HbA1c 11.7      [11-07-19 @ 09:14]  TSH 1.98      [04-05-22 @ 05:19]

## 2022-04-11 NOTE — PROGRESS NOTE ADULT - ASSESSMENT
HPI:   67 year old gentleman pmhx CAD s/p MI/PCI/CABG, hx TIA, afib on pradaxa, HTN, DM2, PVD, gout, L CEA 2014, CKD admitted to ED with ANT, tremors, confusion and lethargy. Receiving new hemodialysis. CT head no acute changes. Mental status improved today. Pt denies any headaches, no slurred speech, no focal facial or limb weakness. Has chronic gout with bilateral finger swelling, pain.     A/P: Presentation consistent with toxic metabolic encephalopathy secondary to ANT/CKD improving with dialysis, CT head no acute changes, no focal findings on exam, no evidence of acute neurological event at this time.     Plan  Continue HD as per renal  Continue supportive care per primary team  Continue stroke prophylaxis- on AC for afib, antiplatelet, statin, optimize hypertension/glycemic control  Neurochecks  No further inpatient neurological tests at this time.   will follow  d/w pt and wife at bedside

## 2022-04-11 NOTE — PROGRESS NOTE ADULT - ASSESSMENT
67 year old gentleman with PMH of CAD, NSTEMI s/p 3 stents in 2014 (stents to LAD, proximal and distal LCx), ischemic cardiomyopathy, HTN for 10 years, T2DM for 26 years c/b peripheral neuropathy, CVA, TIA, Afib on Pradaxa, chronic RLE wound, L carotid stenosis s/p endarterectomy (2014) just recently admitted  to the [facility] on 2/19/2022 with acute onset chest pain for one day found to have an NSTEMI, CAD, s/p PCI in setting of increased AF rates off bb for 3 days and resolved chest pain, his CKMB peaked and Echo noted with EF 60%,normal LV function, mild TR, mild NM. He was s/p [facility] with 85% proximal LAD s/p balloon angioplasty and LULU. He presented to [facility] ED with decreased urine output over the week. Mr. Stevens went to [facility] for hematuria and was started  on Nitrofurantoin. Pt is still taking Nitrofurantoin w/ 5 days left. He came to the ED due to worsening symptoms. Pt reports having a similar incident 4-5 years ago that resolved spontaneously. He reports increased leg edema Dyspnea worse on exertion. his baseline Serum creatinine is in the 2.0 to 2.3 mg/dl range. ANT with serum creatinine of 8.29 with bun 144 and k 5.5        1- ANT on ckd III   2- chf chronic   3- hyperkalemia  on hd   4- uremia improving   5- hyperphosphatemia         ANT likely due to ATN however etiology of his ATN unclear ? due to infection recently vs other etiologies   will obtain serologies for now to see if underlying dx may be different based on serological findings   to have c3/c4/yari anca anti gbm spep/ipep bence jones  plan for HD today  F180, 240 min, , , 3L fluid removal  see HD flowsheet  cont renvela with meals but increase to 2 tab with meals   d/w IR, on schedule for tomorrow for temporary catheter exchange.   d/w RN to lay patient flat for 30 min to see if he tolerates laying flat  d/w pt wife at bedside  strict I/O  trend creatinine and electrolytes daily

## 2022-04-11 NOTE — PROGRESS NOTE ADULT - SUBJECTIVE AND OBJECTIVE BOX
Follow-up Pulm Progress Note    Lethargic, receiving HD  O2 sats 96% on 4LNC  Denies CP, SOB    Medications:  MEDICATIONS  (STANDING):  allopurinol 100 milliGRAM(s) Oral daily  aspirin enteric coated 81 milliGRAM(s) Oral daily  atorvastatin 40 milliGRAM(s) Oral at bedtime  chlorhexidine 2% Cloths 1 Application(s) Topical daily  dextrose 5%. 1000 milliLiter(s) (50 mL/Hr) IV Continuous <Continuous>  dextrose 5%. 1000 milliLiter(s) (100 mL/Hr) IV Continuous <Continuous>  dextrose 50% Injectable 25 Gram(s) IV Push once  dextrose 50% Injectable 12.5 Gram(s) IV Push once  dextrose 50% Injectable 25 Gram(s) IV Push once  diltiazem Infusion 10 mG/Hr (10 mL/Hr) IV Continuous <Continuous>  glucagon  Injectable 1 milliGRAM(s) IntraMuscular once  heparin  Infusion. 2200 Unit(s)/Hr (22 mL/Hr) IV Continuous <Continuous>  hydrALAZINE Injectable 10 milliGRAM(s) IV Push four times a day  insulin lispro (ADMELOG) corrective regimen sliding scale   SubCutaneous three times a day before meals  insulin lispro (ADMELOG) corrective regimen sliding scale   SubCutaneous at bedtime  metoprolol tartrate Injectable 7.5 milliGRAM(s) IV Push every 6 hours  sevelamer carbonate 1600 milliGRAM(s) Oral three times a day with meals    MEDICATIONS  (PRN):  dextrose Oral Gel 15 Gram(s) Oral once PRN Blood Glucose LESS THAN 70 milliGRAM(s)/deciliter  heparin   Injectable 18699 Unit(s) IV Push every 6 hours PRN For aPTT less than 40  heparin   Injectable 08969 Unit(s) IV Push every 6 hours PRN For aPTT less than 40  heparin   Injectable 5000 Unit(s) IV Push every 6 hours PRN For aPTT between 40 - 57    Vital Signs Last 24 Hrs  T(C): 36.6 (11 Apr 2022 11:33), Max: 36.6 (10 Apr 2022 16:59)  T(F): 97.8 (11 Apr 2022 11:33), Max: 97.9 (10 Apr 2022 16:59)  HR: 82 (11 Apr 2022 11:33) (79 - 92)  BP: 165/67 (11 Apr 2022 11:33) (142/57 - 182/70)  BP(mean): --  RR: 18 (11 Apr 2022 11:33) (18 - 18)  SpO2: 96% (11 Apr 2022 11:33) (94% - 99%)    04-10 @ 07:01  -  04-11 @ 07:00  --------------------------------------------------------  IN: 890 mL / OUT: 0 mL / NET: 890 mL    LABS:                        10.1   14.00 )-----------( 217      ( 11 Apr 2022 07:02 )             33.7     04-11    133<L>  |  92<L>  |  71<H>  ----------------------------<  225<H>  4.6   |  22  |  6.42<H>    Ca    9.0      11 Apr 2022 07:07  Phos  8.7     04-09    TPro  6.6  /  Alb  3.0<L>  /  TBili  0.3  /  DBili  x   /  AST  10  /  ALT  <5<L>  /  AlkPhos  67  04-09    CAPILLARY BLOOD GLUCOSE  POCT Blood Glucose.: 234 mg/dL (11 Apr 2022 11:47)    PTT - ( 11 Apr 2022 07:02 )  PTT:71.9 sec    CULTURES: (if applicable)    Culture - Blood (collected 04-04-22 @ 17:46)  Source: .Blood Blood-Peripheral  Final Report (04-09-22 @ 18:00):    No Growth Final    Culture - Blood (collected 04-04-22 @ 17:46)  Source: .Blood Blood-Peripheral  Final Report (04-09-22 @ 18:00):    No Growth Final    Culture - Urine (collected 04-03-22 @ 04:11)  Source: Clean Catch Clean Catch (Midstream)  Final Report (04-04-22 @ 12:00):    <10,000 CFU/mL Normal Urogenital Tiffany    Physical Examination:  PULM: Decreased at bases  CVS: S1, S2 heard    RADIOLOGY REVIEWED  CXR: b/l effusions, atelectasis    CT chest: < from: CT Chest No Cont (04.04.22 @ 16:52) >  FINDINGS:    AIRWAYS, LUNGS, PLEURA: Tracheal secretions. Small left greater than   right pleural effusions increased compared to 2/19/2022. Right-sided   pleural thickening. Lingular and left greater than right basilar   opacification, likely atelectasis.    MEDIASTINUM: Cardiomegaly. No pericardial effusion. Thoracic aorta normal   caliber.  No large mediastinal lymph nodes.    IMAGED ABDOMEN: Nonspecific perinephric stranding.    SOFT TISSUES: Unremarkable.    BONES: Unremarkable.      IMPRESSION:.    Small left greater than right bilateral pleural effusions increased in   size compared to 2/19/2022.    Lingular and left greater than right basilar opacification, likely   atelectasis.    --- End of Report ---    < end of copied text >

## 2022-04-11 NOTE — PROGRESS NOTE ADULT - PROBLEM SELECTOR PLAN 1
Likely 2nd to b/l pl effusions, atelectasis  -Suspect fluid overload given elevated proBNP, LE edema on admission   -Keep O>I with HD as tolerated   -Pt with hx of hydroPTX. CT chest in 2/2022 with pleural thickening, atelectasis. Findings at the time suspected to be chronic. CT A/P 4/2 with small b/l pl effusions L>R, pleural thickening  -CT chest now with small b/l pl effusion R>L, bibasilar atelectasis  -Noted to be unable to lay flat for IR procedure, would continue to optimize fluid removal with HD as tolerated  -Check CXR  -Keep sats >90% with supplemental O2 as needed (currently 4LNC).

## 2022-04-11 NOTE — PROGRESS NOTE ADULT - ASSESSMENT
Patient is a 67 year old male with PMH of CAD with past multiple PCI, with most recent discharge from [place] in Feb 2022 for NSTEMI with LULU of an 85% prox LAD lesion with patient on ASA and now off Plavix per cardiology (only for one month), chronic afib on Pradaxa, HTN, type 2 DM on insulin, diabetic neuropathy with chronic RIGHT LE venous stasis changes>left, LEFT foot ulcer seen by podiatry, past endarterectomy LEFT CEA in 2014 who presented with UTI with hematuria diagnosed at urgent care and now with decreased urine output.     Acute cystitis/UTI    - noted dark urine with hematuria, went to  and was prescribed macrobid, had some improvement in urine color but then noticed decreased urine output with no voiding reported in last 2 days   - UA here with pyuria -  with large LE, negative nitrites and no bacteria    - CTAP reviewed - no hydronephrosis, nephrolithiasis, or evidence of obstructive uropathy   - urine culture negative   - blood cultures negative   - s/p ceftriaxone x7 day course   - monitor off Abx   - monitor temps/WBC --uptrending WBC; will c/w monitoring    ANT on CKD3   - Nephrology following, appreciate recs   - s/p placement of shiley and initiation of HD   - pending permacath placement   - monitor Cr    - renally dose meds     B/l LE edema with chronic venous stasis  Acute on chronic HFpEF   - legs cool to touch, nontender, chronic changes with no sign of infection/cellulitis   - has wound on bottom of L foot - dry and does not appear infected either   - clinically overloaded, Cardiology and Nephrology following, on IV bumex, for HD   - elevate lower extremities      DM2 with neuropathy   - Blood glucose management per primary team.    Infectious Diseases will continue to follow. Please call with any questions.   Annie King M.D.  [facility], Division of Infectious Diseases [phone]

## 2022-04-11 NOTE — PROGRESS NOTE ADULT - SUBJECTIVE AND OBJECTIVE BOX
Admitting Diagnosis:  Chronic kidney disease [N18.9]  CHRONIC KIDNEY DISEASE, UNSPECIFIED    HPI:  This is a 67y year old gentleman pmhx CAD s/p MI/PCI/CABG, hx TIA, afib on pradaxa, HTN, DM2, PVD, gout, L CEA 2014, CKD admitted to ED with ANT, tremors, confusion and lethargy. Receiving new hemodialysis.   CT head no acute changes. Mental status improved today. Pt denies any headaches, no slurred speech, no focal facial or limb weakness. Has chronic gout with bilateral finger swelling, pain.     ****CHART HPI:  HPI:  NIGHT HOSPITALIST:   Patient remotely known to me from an admission to [place] in Feb 2017--assigned to me at this point by the ER to admit this 66 y/o M--followed by his nephrologist above--patient with a history of CAD with past multiple PCI, with most recent discharge from [place] in Feb 2022 for non ST elevation MI with LULU of an 85% prox LAD lesion with patient on ASA and now off Plavix per cardiology (only for one month), chronic atrial fibrillation maintained on Pradaxa, essential HTN, type 2 DM previously on oral medications but on insulin, diabetic neuropathy with chronic RIGHT LE venous stasis changes>left, LEFT foot ulcer seen by podiatry, past endarterectomy LEFT CEA in 2014. with patient self referring to the ER following apparently treatment by an urgent care center for an apparent cystitis with hematuria on nitrofurantoin but with patient noting decreased urine output for the past week, and difficulty with B/L coarse tremors for several weeks, with patient having difficulty negotiating his applications on his smart phone (observed by examiner).  Patient with increasing B/L LE oedema and weight gain, with patient with 2 pillow orthopnoea.      Patient was recommended for a [place] but patient refused, patient wishing to review the issue with his daughter.    Patient with past history of COVID-19 in 12/2021 and has received the COVID-19 vaccine x 2.    pt and wife feel he is less confused  ******    Past Medical History:  HTN (hypertension), benign [401.1]  HLD (hyperlipidemia) [272.4]  DM type 2 (diabetes mellitus, type 2) [250.00]  TIA (transient ischemic attack) [435.9]  Atrial fibrillation [427.31]  MI (myocardial infarction) [410.90]  circa 2014 and 2022.  CAD (coronary artery disease) [414.00]  Neuropathy [G62.9]  Stage 3 chronic kidney disease [N18.30]  2019 novel coronavirus disease (COVID-19) [U07.1]  12/2021.  Received the COVID-19 vaccine x 2 as of April 2022.    Past Surgical History:  Status post angioplasty with stent [V45.82]  LULU x 3 2/7/2014  S/P carotid endarterectomy [Z98.89]  left  S/P CABG (coronary artery bypass graft) [Z95.1]        Social History:  No toxic habits    Family History:  FAMILY HISTORY:  Family history of heart disease  father    Family history of CVA  mother    Allergies:  nitrofurantoin (Nephrotoxicity)    ROS: as per HPI.  Advanced care planning reviewed and noted in the chart.      Medications:  allopurinol 100 milliGRAM(s) Oral daily  aspirin enteric coated 81 milliGRAM(s) Oral daily  atorvastatin 40 milliGRAM(s) Oral at bedtime  chlorhexidine 2% Cloths 1 Application(s) Topical daily  dextrose 5%. 1000 milliLiter(s) IV Continuous <Continuous>  dextrose 5%. 1000 milliLiter(s) IV Continuous <Continuous>  dextrose 50% Injectable 25 Gram(s) IV Push once  dextrose 50% Injectable 12.5 Gram(s) IV Push once  dextrose 50% Injectable 25 Gram(s) IV Push once  dextrose Oral Gel 15 Gram(s) Oral once PRN  diltiazem Infusion 10 mG/Hr IV Continuous <Continuous>  glucagon  Injectable 1 milliGRAM(s) IntraMuscular once  heparin   Injectable 92891 Unit(s) IV Push every 6 hours PRN  heparin   Injectable 5000 Unit(s) IV Push every 6 hours PRN  heparin  Infusion. 2200 Unit(s)/Hr IV Continuous <Continuous>  hydrALAZINE Injectable 10 milliGRAM(s) IV Push four times a day  insulin lispro (ADMELOG) corrective regimen sliding scale   SubCutaneous three times a day before meals  insulin lispro (ADMELOG) corrective regimen sliding scale   SubCutaneous at bedtime  metoprolol tartrate Injectable 7.5 milliGRAM(s) IV Push every 6 hours  sevelamer carbonate 1600 milliGRAM(s) Oral three times a day with meals      Labs:  CBC Full  -  ( 11 Apr 2022 07:02 )  WBC Count : 14.00 K/uL  RBC Count : 4.09 M/uL  Hemoglobin : 10.1 g/dL  Hematocrit : 33.7 %  Platelet Count - Automated : 217 K/uL  Mean Cell Volume : 82.4 fl  Mean Cell Hemoglobin : 24.7 pg  Mean Cell Hemoglobin Concentration : 30.0 gm/dL  Auto Neutrophil # : x  Auto Lymphocyte # : x  Auto Monocyte # : x  Auto Eosinophil # : x  Auto Basophil # : x  Auto Neutrophil % : x  Auto Lymphocyte % : x  Auto Monocyte % : x  Auto Eosinophil % : x  Auto Basophil % : x    04-11    133<L>  |  92<L>  |  71<H>  ----------------------------<  225<H>  4.6   |  22  |  6.42<H>    Ca    9.0      11 Apr 2022 07:07  Phos  8.7     04-09    TPro  6.6  /  Alb  3.0<L>  /  TBili  0.3  /  DBili  x   /  AST  10  /  ALT  <5<L>  /  AlkPhos  67  04-09    CAPILLARY BLOOD GLUCOSE      POCT Blood Glucose.: 206 mg/dL (11 Apr 2022 07:48)  POCT Blood Glucose.: 206 mg/dL (10 Apr 2022 21:38)  POCT Blood Glucose.: 198 mg/dL (10 Apr 2022 17:04)  POCT Blood Glucose.: 180 mg/dL (10 Apr 2022 12:08)    LIVER FUNCTIONS - ( 09 Apr 2022 16:11 )  Alb: 3.0 g/dL / Pro: 6.6 g/dL / ALK PHOS: 67 U/L / ALT: <5 U/L / AST: 10 U/L / GGT: x           PTT - ( 11 Apr 2022 07:02 )  PTT:71.9 sec        Vitals:  Vital Signs Last 24 Hrs  T(C): 36.6 (11 Apr 2022 05:38), Max: 36.7 (10 Apr 2022 11:36)  T(F): 97.9 (11 Apr 2022 05:38), Max: 98 (10 Apr 2022 11:36)  HR: 90 (11 Apr 2022 05:38) (79 - 107)  BP: 182/70 (11 Apr 2022 05:38) (133/73 - 182/70)  BP(mean): --  RR: 18 (11 Apr 2022 05:38) (18 - 18)  SpO2: 96% (11 Apr 2022 05:38) (94% - 99%)    NEUROLOGICAL EXAM:    Mental status: Awake, alert, and in no apparent distress. Oriented to self, Located within [facility], April, 2022, [place]. Speech clear and fluent, names well, follows commands, no RL confusion, able to perform complex calculations.     Cranial Nerves: Pupils were equal, round, reactive to light. Extraocular movements were intact. B BTT. Fundoscopic exam was deferred. Facial sensation was intact to light touch. There was no facial asymmetry. The palate was upgoing symmetrically and tongue was midline. Hearing acuity was intact to finger rub AU. Shoulder shrug was full bilaterally    Motor exam: Bulk and tone were normal. Strength was 5/5 in all four extremities. LE limited by marked swelling/PVD. Fine finger movements were symmetric and normal. There was no pronator drift    Reflexes: DTRs depressed, flexor plantars.     Sensation: Intact to light touch, temperature.    Coordination: Finger-nose-finger intact.   Gait: deferred    Imaging:    ACC: [id number] EXAM:  CT BRAIN                        PROCEDURE DATE:  04/09/2022      INTERPRETATION:  CLINICAL INFORMATION:  Altered mental status, on   anticoagulation .    TECHNIQUE: Multiple contiguous axial images were acquired from the   skullbase to the vertex without the administration of intravenous   contrast.  Coronal and sagittal reformations were made.    COMPARISON: CT head 10/21/2021, brain MRI 02/23/2014.    FINDINGS:    Scattered lacunar infarcts in the bilateral cerebralhemispheres are   grossly stable compared to the 10/21/2021 head CT. No new additional   infarcts are noted over the time interval.    No acute intracranial hemorrhage, mass effect, or midline shift. The   brain demonstrates periventricular hypoattenuation, nonspecific in   etiology but likely representing chronic microvascular ischemic changes.    No abnormal extra-axial fluid collections are present.    The ventricles and sulci demonstrate age appropriate involutional   changes.  The basal cisterns are patent.    The orbits are unremarkable. The paranasal sinuses are clear. The mastoid   air cells are clear. The calvarium is intact.    IMPRESSION:    No acute intracranial abnormality is noted. If the patient has new and   persistent symptoms, consider short interval follow-up head CT or brain   MRI.    --- End of Report ---    GATITO VILLARREAL MD; Resident Radiologist  This document has been electronically signed.  JESSE CORONA MD; Attending Radiologist  This document has been electronically signed. Apr 9 2022  2:00PM   negative

## 2022-04-12 NOTE — RAPID RESPONSE TEAM SUMMARY - NSSITUATIONBACKGROUNDRRT_GEN_ALL_CORE
RRT called for bleeding after pulling shiley. per rn, shiley was pulled and blood shot from groin and pt started bleeding profusely after which pressure had been held. At bedside, pt BP stable, satting well, heparin gtt had been stopped, coags slightly elevated. Surgery called at bedside to examine groin. On examination, groin no longer bleeding and without hematoma. Plan is to hold pressure for 30 minutes as per surgery and follow up repeat labs and surgery recs. t+s sent as well during rrt

## 2022-04-12 NOTE — CHART NOTE - NSCHARTNOTEFT_GEN_A_CORE
Ptn cardizenm gtt was discontinued and started on cardizem  mgs.  Telemmetry reported a 2 sec pause and additionally bradycardia   Ptn asymptomatic, spoke with cardiology and advised to stop the cardizem cd  Vital Signs Last 24 Hrs  T(C): 36.8 (12 Apr 2022 14:10), Max: 36.8 (12 Apr 2022 10:15)  T(F): 98.2 (12 Apr 2022 14:10), Max: 98.2 (12 Apr 2022 10:15)  HR: 71 (12 Apr 2022 14:10) (71 - 91)  BP: 130/69 (12 Apr 2022 14:10) (130/69 - 165/68)  BP(mean): --  RR: 19 (12 Apr 2022 14:10) (18 - 19)  SpO2: 98% (12 Apr 2022 14:10) (93% - 98%)  A/P s/p 2 sec pause and bradycardia  d/c cardizem cd and monitor closely as discussed with cardiology

## 2022-04-12 NOTE — PROGRESS NOTE ADULT - PROBLEM SELECTOR PLAN 1
Likely 2nd to b/l pl effusions, atelectasis  -Suspect fluid overload given elevated proBNP, LE edema on admission   -Keep O>I with HD as tolerated   -Pt with hx of hydroPTX. CT chest in 2/2022 with pleural thickening, atelectasis. Findings at the time suspected to be chronic. CT A/P 4/2 with small b/l pl effusions L>R, pleural thickening  -CT chest now with small b/l pl effusion R>L, bibasilar atelectasis  -Hypoxia improving, keep sats >90% with supplemental O2 as needed (currently 1LNC).

## 2022-04-12 NOTE — PROGRESS NOTE ADULT - ASSESSMENT
Patient is a 67 year old male with PMH of CAD with past multiple PCI, with most recent discharge from [place] in Feb 2022 for NSTEMI with LULU of an 85% prox LAD lesion with patient on ASA and now off Plavix per cardiology (only for one month), chronic afib on Pradaxa, HTN, type 2 DM on insulin, diabetic neuropathy with chronic RIGHT LE venous stasis changes>left, LEFT foot ulcer seen by podiatry, past endarterectomy LEFT CEA in 2014 who presented with UTI with hematuria diagnosed at urgent care and now with decreased urine output.     Acute cystitis/UTI    - noted dark urine with hematuria, went to  and was prescribed macrobid, had some improvement in urine color but then noticed decreased urine output with no voiding reported in last 2 days   - UA here with pyuria -  with large LE, negative nitrites and no bacteria    - CTAP reviewed - no hydronephrosis, nephrolithiasis, or evidence of obstructive uropathy   - urine culture negative   - blood cultures negative   - s/p ceftriaxone x7 day course   - monitor off Abx   - monitor temps/WBC --uptrending WBC; will c/w monitoring    ANT on CKD3   - Nephrology following, appreciate recs   - s/p placement of shiley and initiation of HD   - pending permacath placement   - monitor Cr    - renally dose meds     B/l LE edema with chronic venous stasis  Acute on chronic HFpEF   - legs cool to touch, nontender, chronic changes with no sign of infection/cellulitis   - has wound on bottom of L foot - dry and does not appear infected either   - clinically overloaded, Cardiology and Nephrology following, on IV bumex, for HD   - elevate lower extremities      DM2 with neuropathy   - Blood glucose management per primary team.    Infectious Diseases will continue to follow. Please call with any questions.   Annie King M.D.  [facility], Division of Infectious Diseases [phone]

## 2022-04-12 NOTE — PROGRESS NOTE ADULT - PROBLEM SELECTOR PLAN 5
-S/p MICU evaluation 4/5 for lethargy  -Intermittent confusion/agitation, now improving   -ABG with no CO2 retention, sedating medications held  -? if 2nd to uremia, HD per renal.

## 2022-04-12 NOTE — PROGRESS NOTE ADULT - SUBJECTIVE AND OBJECTIVE BOX
DATE OF SERVICE: 04-12-22 @ 13:14  CHIEF COMPLAINT:Patient is a 67y old  Male who presents with a chief complaint of Decreased urine output for the past week, leg oedema (12 Apr 2022 12:16)    	        PAST MEDICAL & SURGICAL HISTORY:  HTN (hypertension), benign    HLD (hyperlipidemia)    DM type 2 (diabetes mellitus, type 2)    TIA (transient ischemic attack)    Atrial fibrillation    MI (myocardial infarction)  circa 2014 and 2022.    CAD (coronary artery disease)    Neuropathy    Stage 3 chronic kidney disease    2019 novel coronavirus disease (COVID-19)  12/2021.  Received the COVID-19 vaccine x 2 as of April 2022.    Status post angioplasty with stent  LULU x 3 2/7/2014    S/P carotid endarterectomy  left            awake  alert  feels better  RESPIRATORY: No cough, wheezing, chills or hemoptysis; No Shortness of Breath  CARDIOVASCULAR: No chest pain, palpitations, passing out, dizziness,   GASTROINTESTINAL: No abdominal or epigastric pain. No nausea, vomiting, or hematemesis;  GENITOURINARY: No dysuria, frequency, hematuria,   NEUROLOGICAL: No headaches,     Medications:  MEDICATIONS  (STANDING):  allopurinol 100 milliGRAM(s) Oral daily  aspirin enteric coated 81 milliGRAM(s) Oral daily  atorvastatin 40 milliGRAM(s) Oral at bedtime  chlorhexidine 2% Cloths 1 Application(s) Topical daily  chlorhexidine 4% Liquid 1 Application(s) Topical <User Schedule>  dextrose 5%. 1000 milliLiter(s) (100 mL/Hr) IV Continuous <Continuous>  dextrose 5%. 1000 milliLiter(s) (50 mL/Hr) IV Continuous <Continuous>  dextrose 50% Injectable 25 Gram(s) IV Push once  dextrose 50% Injectable 12.5 Gram(s) IV Push once  dextrose 50% Injectable 25 Gram(s) IV Push once  diltiazem    milliGRAM(s) Oral daily  glucagon  Injectable 1 milliGRAM(s) IntraMuscular once  heparin  Infusion.  Unit(s)/Hr (24 mL/Hr) IV Continuous <Continuous>  hydrALAZINE 25 milliGRAM(s) Oral three times a day  insulin lispro (ADMELOG) corrective regimen sliding scale   SubCutaneous three times a day before meals  insulin lispro (ADMELOG) corrective regimen sliding scale   SubCutaneous at bedtime  metoprolol succinate  milliGRAM(s) Oral daily  pregabalin 25 milliGRAM(s) Oral two times a day  senna 2 Tablet(s) Oral at bedtime  sevelamer carbonate 1600 milliGRAM(s) Oral three times a day with meals    MEDICATIONS  (PRN):  bisacodyl 5 milliGRAM(s) Oral every 12 hours PRN Constipation  dextrose Oral Gel 15 Gram(s) Oral once PRN Blood Glucose LESS THAN 70 milliGRAM(s)/deciliter  heparin   Injectable 10958 Unit(s) IV Push every 6 hours PRN For aPTT less than 40  heparin   Injectable 5000 Unit(s) IV Push every 6 hours PRN For aPTT between 40 - 57  sodium chloride 0.9% lock flush 10 milliLiter(s) IV Push every 1 hour PRN Pre/post blood products, medications, blood draw, and to maintain line patency    	    PHYSICAL EXAM:  T(C): 36.4 (04-12-22 @ 11:46), Max: 36.8 (04-12-22 @ 10:15)  HR: 83 (04-12-22 @ 12:35) (76 - 120)  BP: 132/64 (04-12-22 @ 12:35) (132/64 - 177/74)  RR: 18 (04-12-22 @ 11:46) (18 - 19)  SpO2: 95% (04-12-22 @ 11:46) (93% - 97%)  Wt(kg): --  I&O's Summary    11 Apr 2022 07:01  -  12 Apr 2022 07:00  --------------------------------------------------------  IN: 480 mL / OUT: 3000 mL / NET: -2520 mL        Appearance: Normal	  HEENT:   Normal oral mucosa, PERRL, EOMI	  Lymphatic: No lymphadenopathy  Cardiovascular: Normal S1 S2, No JVD,   Respiratory: Lungs clear to auscultation	  Psychiatry: A & O x 3, Mood & affect appropriate  Gastrointestinal:  Soft, Non-tender, + BS	  is	  Neurologic: Non-focal  Extremities:pvd  dec rom    TELEMETRY: 	    ECG:  	  RADIOLOGY:  OTHER: 	  	  LABS:	 	    CARDIAC MARKERS:                                10.1   15.29 )-----------( 230      ( 12 Apr 2022 07:22 )             32.9     04-12    131<L>  |  92<L>  |  62<H>  ----------------------------<  188<H>  4.7   |  22  |  5.76<H>    Ca    8.9      12 Apr 2022 07:22      proBNP:   Lipid Profile:   HgA1c:   TSH:

## 2022-04-12 NOTE — PROGRESS NOTE ADULT - SUBJECTIVE AND OBJECTIVE BOX
Follow-up Pulm Progress Note    S/p IJ HD catheter placed in IR   No new respiratory events overnight.  Denies SOB/CP.   Sats low 90s on 1LNC     Medications:  MEDICATIONS  (STANDING):  acetaminophen   IVPB .. 1000 milliGRAM(s) IV Intermittent once  allopurinol 100 milliGRAM(s) Oral daily  aspirin enteric coated 81 milliGRAM(s) Oral daily  atorvastatin 40 milliGRAM(s) Oral at bedtime  chlorhexidine 2% Cloths 1 Application(s) Topical daily  chlorhexidine 4% Liquid 1 Application(s) Topical <User Schedule>  dextrose 5%. 1000 milliLiter(s) (100 mL/Hr) IV Continuous <Continuous>  dextrose 5%. 1000 milliLiter(s) (50 mL/Hr) IV Continuous <Continuous>  dextrose 50% Injectable 25 Gram(s) IV Push once  dextrose 50% Injectable 12.5 Gram(s) IV Push once  dextrose 50% Injectable 25 Gram(s) IV Push once  diltiazem    milliGRAM(s) Oral daily  glucagon  Injectable 1 milliGRAM(s) IntraMuscular once  heparin  Infusion.  Unit(s)/Hr (24 mL/Hr) IV Continuous <Continuous>  hydrALAZINE 25 milliGRAM(s) Oral three times a day  insulin lispro (ADMELOG) corrective regimen sliding scale   SubCutaneous three times a day before meals  insulin lispro (ADMELOG) corrective regimen sliding scale   SubCutaneous at bedtime  metoprolol succinate  milliGRAM(s) Oral daily  sevelamer carbonate 1600 milliGRAM(s) Oral three times a day with meals    MEDICATIONS  (PRN):  dextrose Oral Gel 15 Gram(s) Oral once PRN Blood Glucose LESS THAN 70 milliGRAM(s)/deciliter  heparin   Injectable 11267 Unit(s) IV Push every 6 hours PRN For aPTT less than 40  heparin   Injectable 5000 Unit(s) IV Push every 6 hours PRN For aPTT between 40 - 57  sodium chloride 0.9% lock flush 10 milliLiter(s) IV Push every 1 hour PRN Pre/post blood products, medications, blood draw, and to maintain line patency          Vital Signs Last 24 Hrs  T(C): 36.4 (12 Apr 2022 11:46), Max: 36.8 (12 Apr 2022 10:15)  T(F): 97.5 (12 Apr 2022 11:46), Max: 98.2 (12 Apr 2022 10:15)  HR: 76 (12 Apr 2022 11:46) (76 - 120)  BP: 157/51 (12 Apr 2022 11:46) (145/65 - 177/74)  BP(mean): --  RR: 18 (12 Apr 2022 11:46) (18 - 19)  SpO2: 95% (12 Apr 2022 11:46) (93% - 97%)          04-11 @ 07:01  -  04-12 @ 07:00  --------------------------------------------------------  IN: 480 mL / OUT: 3000 mL / NET: -2520 mL          LABS:                        10.1   15.29 )-----------( 230      ( 12 Apr 2022 07:22 )             32.9     04-12    131<L>  |  92<L>  |  62<H>  ----------------------------<  188<H>  4.7   |  22  |  5.76<H>    Ca    8.9      12 Apr 2022 07:22            CAPILLARY BLOOD GLUCOSE      POCT Blood Glucose.: 175 mg/dL (12 Apr 2022 12:05)    PT/INR - ( 12 Apr 2022 07:24 )   PT: 14.6 sec;   INR: 1.27 ratio         PTT - ( 12 Apr 2022 01:42 )  PTT:51.0 sec                    CULTURES:    Culture - Blood (collected 04-04-22 @ 17:46)  Source: .Blood Blood-Peripheral  Final Report (04-09-22 @ 18:00):    No Growth Final    Culture - Blood (collected 04-04-22 @ 17:46)  Source: .Blood Blood-Peripheral  Final Report (04-09-22 @ 18:00):    No Growth Final        Culture - Urine (collected 04-03-22 @ 04:11)  Source: Clean Catch Clean Catch (Midstream)  Final Report (04-04-22 @ 12:00):    <10,000 CFU/mL Normal Urogenital Tiffany            Physical Examination:  PULM: Decreased at bases  CVS: S1, S2 heard    RADIOLOGY REVIEWED  CXR 4/11: unchanged b/l effusions, atelectasis    CT chest: < from: CT Chest No Cont (04.04.22 @ 16:52) >  FINDINGS:    AIRWAYS, LUNGS, PLEURA: Tracheal secretions. Small left greater than   right pleural effusions increased compared to 2/19/2022. Right-sided   pleural thickening. Lingular and left greater than right basilar   opacification, likely atelectasis.    MEDIASTINUM: Cardiomegaly. No pericardial effusion. Thoracic aorta normal   caliber.  No large mediastinal lymph nodes.    IMAGED ABDOMEN: Nonspecific perinephric stranding.    SOFT TISSUES: Unremarkable.    BONES: Unremarkable.      IMPRESSION:.    Small left greater than right bilateral pleural effusions increased in   size compared to 2/19/2022.    Lingular and left greater than right basilar opacification, likely   atelectasis.    --- End of Report ---    < end of copied text >

## 2022-04-12 NOTE — PROGRESS NOTE ADULT - SUBJECTIVE AND OBJECTIVE BOX
[facility]   [phone]  RENAL FOLLOW UP NOTE  --------------------------------------------------------------------------------  Chief Complaint:    24 hour events/subjective:    Patient seen and examined  s/p R IJ HD cath placement  alert  c/o neuropathy pain    PAST HISTORY  --------------------------------------------------------------------------------  No significant changes to PMH, PSH, FHx, SHx, unless otherwise noted    ALLERGIES & MEDICATIONS  --------------------------------------------------------------------------------  Allergies    nitrofurantoin (Nephrotoxicity)    Intolerances      Standing Inpatient Medications  allopurinol 100 milliGRAM(s) Oral daily  aspirin enteric coated 81 milliGRAM(s) Oral daily  atorvastatin 40 milliGRAM(s) Oral at bedtime  chlorhexidine 2% Cloths 1 Application(s) Topical daily  chlorhexidine 4% Liquid 1 Application(s) Topical <User Schedule>  dextrose 5%. 1000 milliLiter(s) IV Continuous <Continuous>  dextrose 5%. 1000 milliLiter(s) IV Continuous <Continuous>  dextrose 50% Injectable 25 Gram(s) IV Push once  dextrose 50% Injectable 12.5 Gram(s) IV Push once  dextrose 50% Injectable 25 Gram(s) IV Push once  diltiazem    milliGRAM(s) Oral daily  glucagon  Injectable 1 milliGRAM(s) IntraMuscular once  heparin  Infusion.  Unit(s)/Hr IV Continuous <Continuous>  hydrALAZINE 25 milliGRAM(s) Oral three times a day  insulin lispro (ADMELOG) corrective regimen sliding scale   SubCutaneous three times a day before meals  insulin lispro (ADMELOG) corrective regimen sliding scale   SubCutaneous at bedtime  metoprolol succinate  milliGRAM(s) Oral daily  pregabalin 25 milliGRAM(s) Oral two times a day  senna 2 Tablet(s) Oral at bedtime  sevelamer carbonate 1600 milliGRAM(s) Oral three times a day with meals    PRN Inpatient Medications  bisacodyl 5 milliGRAM(s) Oral every 12 hours PRN  dextrose Oral Gel 15 Gram(s) Oral once PRN  heparin   Injectable 29682 Unit(s) IV Push every 6 hours PRN  heparin   Injectable 5000 Unit(s) IV Push every 6 hours PRN  sodium chloride 0.9% lock flush 10 milliLiter(s) IV Push every 1 hour PRN      REVIEW OF SYSTEMS  --------------------------------------------------------------------------------    Gen: denies fevers/chills,  CVS: denies chest pain/palpitations  Resp: denies SOB/Cough  GI: Denies N/V/Abd pain  : Denies dysuria    VITALS/PHYSICAL EXAM  --------------------------------------------------------------------------------  T(C): 36.4 (04-12-22 @ 11:46), Max: 36.8 (04-12-22 @ 10:15)  HR: 83 (04-12-22 @ 12:35) (76 - 91)  BP: 132/64 (04-12-22 @ 12:35) (132/64 - 165/68)  RR: 18 (04-12-22 @ 11:46) (18 - 19)  SpO2: 95% (04-12-22 @ 11:46) (93% - 97%)  Wt(kg): --        04-11-22 @ 07:01  -  04-12-22 @ 07:00  --------------------------------------------------------  IN: 480 mL / OUT: 3000 mL / NET: -2520 mL    04-12-22 @ 07:01  -  04-12-22 @ 13:36  --------------------------------------------------------  IN: 240 mL / OUT: 0 mL / NET: 240 mL      Physical Exam:  	  	Gen: Appears comfortable, on O2, sitting in chair  	Pulm: Decreased breath sounds b/l bases. no rales ronchi or wheezing  	CV: No JVD. IRR,    	Abd: +BS, soft, nontender, softly distended, obese   	: No suprapubic tenderness.               Extremity UE: increased warmth, swollen fingers B/L               Extremity LE: + hyperpigmented bilateral LE with B/L non-pitting edema- improved, B/L LE tender on palpation              Vascular: R groin HD cath, R IJ HD catheter    LABS/STUDIES  --------------------------------------------------------------------------------              10.1   15.29 >-----------<  230      [04-12-22 @ 07:22]              32.9     131  |  92  |  62  ----------------------------<  188      [04-12-22 @ 07:22]  4.7   |  22  |  5.76        Ca     8.9     [04-12-22 @ 07:22]      PT/INR: PT 14.6 , INR 1.27       [04-12-22 @ 07:24]  PTT: 51.0       [04-12-22 @ 01:42]      Creatinine Trend:  SCr 5.76 [04-12 @ 07:22]  SCr 6.42 [04-11 @ 07:07]  SCr 4.71 [04-10 @ 07:29]  SCr 6.87 [04-09 @ 16:11]  SCr 7.21 [04-08 @ 07:10]              Urinalysis - [04-03-22 @ 00:48]      Color Light Orange / Appearance Turbid / SG 1.021 / pH 5.5      Gluc Negative / Ketone Negative  / Bili Negative / Urobili Negative       Blood Large / Protein 300 mg/dL / Leuk Est Large / Nitrite Negative      RBC 20 /  / Hyaline 10 / Gran  / Sq Epi  / Non Sq Epi 3 / Bacteria Negative      Iron 31, TIBC 243, %sat 13      [04-04-22 @ 00:17]  Ferritin 152      [04-03-22 @ 23:06]  PTH -- (Ca --)      [04-03-22 @ 23:06]   97  HbA1c 11.7      [11-07-19 @ 09:14]  TSH 1.98      [04-05-22 @ 05:19]    C3 Complement 61      [04-11-22 @ 16:56]  C4 Complement 28      [04-11-22 @ 16:56]  Free Light Chains: kappa 16.92, lambda 12.63, ratio = 1.34      [04-11 @ 16:56]

## 2022-04-12 NOTE — CONSULT NOTE ADULT - SUBJECTIVE AND OBJECTIVE BOX
HPI: 67M with PMH of CAD with past multiple PCI, with most recent discharge from [facility] in Feb 2022 for NSTEMI with LULU of an 85% prox LAD lesion with patient on ASA and now off Plavix per cardiology (only for one month), chronic afib on Pradaxa, HTN, type 2 DM on insulin, diabetic neuropathy with chronic RIGHT LE venous stasis changes>left, LEFT foot ulcer seen by podiatry, past endarterectomy LEFT CEA in 2014 who presented with UTI with hematuria diagnosed at urgent care and now with decreased urine output. Pt had shiley placed and line was pulled earlier this evening. Vascular surgery consulted for bleeding from site.      PAST MEDICAL & SURGICAL HISTORY:  HTN (hypertension), benign    HLD (hyperlipidemia)    DM type 2 (diabetes mellitus, type 2)    TIA (transient ischemic attack)    Atrial fibrillation    MI (myocardial infarction)  circa 2014 and 2022.    CAD (coronary artery disease)    Neuropathy    Stage 3 chronic kidney disease    2019 novel coronavirus disease (COVID-19)  12/2021.  Received the COVID-19 vaccine x 2 as of April 2022.    Status post angioplasty with stent  LULU x 3 2/7/2014    S/P carotid endarterectomy  left        REVIEW OF SYSTEMS    10 point review of systems was assessed and is unremarkable other than what was mentioned in the HPI    MEDICATIONS  (STANDING):  allopurinol 100 milliGRAM(s) Oral daily  aspirin enteric coated 81 milliGRAM(s) Oral daily  atorvastatin 40 milliGRAM(s) Oral at bedtime  chlorhexidine 2% Cloths 1 Application(s) Topical daily  chlorhexidine 4% Liquid 1 Application(s) Topical <User Schedule>  desmopressin Injectable 0.2 MICROGram(s) IV Push once  dextrose 5%. 1000 milliLiter(s) (100 mL/Hr) IV Continuous <Continuous>  dextrose 5%. 1000 milliLiter(s) (50 mL/Hr) IV Continuous <Continuous>  dextrose 50% Injectable 25 Gram(s) IV Push once  dextrose 50% Injectable 12.5 Gram(s) IV Push once  dextrose 50% Injectable 25 Gram(s) IV Push once  glucagon  Injectable 1 milliGRAM(s) IntraMuscular once  heparin  Infusion.  Unit(s)/Hr (24 mL/Hr) IV Continuous <Continuous>  hydrALAZINE 25 milliGRAM(s) Oral three times a day  insulin lispro (ADMELOG) corrective regimen sliding scale   SubCutaneous three times a day before meals  insulin lispro (ADMELOG) corrective regimen sliding scale   SubCutaneous at bedtime  metoprolol succinate  milliGRAM(s) Oral daily  phytonadione  IVPB 10 milliGRAM(s) IV Intermittent once  pregabalin 25 milliGRAM(s) Oral two times a day  senna 2 Tablet(s) Oral at bedtime  sevelamer carbonate 1600 milliGRAM(s) Oral three times a day with meals    MEDICATIONS  (PRN):  bisacodyl 5 milliGRAM(s) Oral every 12 hours PRN Constipation  dextrose Oral Gel 15 Gram(s) Oral once PRN Blood Glucose LESS THAN 70 milliGRAM(s)/deciliter  heparin   Injectable 83181 Unit(s) IV Push every 6 hours PRN For aPTT less than 40  heparin   Injectable 5000 Unit(s) IV Push every 6 hours PRN For aPTT between 40 - 57  sodium chloride 0.9% lock flush 10 milliLiter(s) IV Push every 1 hour PRN Pre/post blood products, medications, blood draw, and to maintain line patency      Allergies    nitrofurantoin (Nephrotoxicity)    Intolerances        SOCIAL HISTORY:  Occupation:  Smoking Hx: denies  Etoh Hx: denies  IVDA Hx: denies    FAMILY HISTORY:  Family history of heart disease  father    Family history of CVA  mother      unless noted, no significant family hx with Mother, Father, Siblings    Vital Signs Last 24 Hrs  T(C): 36.8 (12 Apr 2022 14:10), Max: 36.8 (12 Apr 2022 10:15)  T(F): 98.2 (12 Apr 2022 14:10), Max: 98.2 (12 Apr 2022 10:15)  HR: 71 (12 Apr 2022 14:10) (71 - 91)  BP: 130/69 (12 Apr 2022 14:10) (130/69 - 165/68)  BP(mean): --  RR: 19 (12 Apr 2022 14:10) (18 - 19)  SpO2: 98% (12 Apr 2022 14:10) (93% - 98%)    General: WN/WD NAD  Neurology: Awake, nonfocal, ROA x 4  Eyes: Scleras clear, PERRLA/ EOMI, Gross vision intact  ENT:Gross hearing intact, grossly patent pharynx, no stridor  Neck: Neck supple, trachea midline, No JVD,   Respiratory: CTA B/L, No wheezing, rales, rhonchi  CV: RRR, S1S2, no murmurs, rubs or gallops  Abdominal: Soft, NT, ND +BS,   Extremities: No edema, + peripheral pulses  Skin: No Rashes, Hematoma, Ecchymosis  Lymphatic: No Neck, axilla, groin LAD  Psych: Oriented x 3, normal affect  Incisions:   Tubes:    LABS:                        10.1   15.29 )-----------( 230      ( 12 Apr 2022 07:22 )             32.9     04-12    131<L>  |  92<L>  |  62<H>  ----------------------------<  188<H>  4.7   |  22  |  5.76<H>    Ca    8.9      12 Apr 2022 07:22      PT/INR - ( 12 Apr 2022 07:24 )   PT: 14.6 sec;   INR: 1.27 ratio         PTT - ( 12 Apr 2022 01:42 )  PTT:51.0 sec      RADIOLOGY & ADDITIONAL STUDIES:    ASSESSMENT:   67yMalePAST MEDICAL & SURGICAL HISTORY:  HTN (hypertension), benign    HLD (hyperlipidemia)    DM type 2 (diabetes mellitus, type 2)    TIA (transient ischemic attack)    Atrial fibrillation    MI (myocardial infarction)  circa 2014 and 2022.    CAD (coronary artery disease)    Neuropathy    Stage 3 chronic kidney disease    2019 novel coronavirus disease (COVID-19)  12/2021.  Received the COVID-19 vaccine x 2 as of April 2022.    Status post angioplasty with stent  LULU x 3 2/7/2014    S/P carotid endarterectomy  left    HEALTH ISSUES - PROBLEM Dx:  Acute kidney injury superimposed on CKD    Chronic atrial fibrillation    Cystitis    Type 2 diabetes mellitus    CAD (coronary artery disease)    Shortness of breath    AMS (altered mental status)        HEALTH ISSUES - R/O PROBLEM Dx:      PLAN: HPI: 67M with PMH of CAD with past multiple PCI, with most recent discharge from [place] in Feb 2022 for NSTEMI with LULU of an 85% prox LAD lesion with patient on ASA and now off Plavix per cardiology (only for one month), chronic afib on Pradaxa, HTN, type 2 DM on insulin, diabetic neuropathy with chronic RIGHT LE venous stasis changes>left, LEFT foot ulcer seen by podiatry, past endarterectomy LEFT CEA in 2014 who presented with UTI with hematuria diagnosed at urgent care and now with decreased urine output. Pt had shiley placed and line was pulled earlier this evening. Vascular surgery consulted for bleeding from site.      PAST MEDICAL & SURGICAL HISTORY:  HTN (hypertension), benign    HLD (hyperlipidemia)    DM type 2 (diabetes mellitus, type 2)    TIA (transient ischemic attack)    Atrial fibrillation    MI (myocardial infarction)  circa 2014 and 2022.    CAD (coronary artery disease)    Neuropathy    Stage 3 chronic kidney disease    2019 novel coronavirus disease (COVID-19)  12/2021.  Received the COVID-19 vaccine x 2 as of April 2022.    Status post angioplasty with stent  LULU x 3 2/7/2014    S/P carotid endarterectomy  left        REVIEW OF SYSTEMS    10 point review of systems was assessed and is unremarkable other than what was mentioned in the HPI    MEDICATIONS  (STANDING):  allopurinol 100 milliGRAM(s) Oral daily  aspirin enteric coated 81 milliGRAM(s) Oral daily  atorvastatin 40 milliGRAM(s) Oral at bedtime  chlorhexidine 2% Cloths 1 Application(s) Topical daily  chlorhexidine 4% Liquid 1 Application(s) Topical <User Schedule>  desmopressin Injectable 0.2 MICROGram(s) IV Push once  dextrose 5%. 1000 milliLiter(s) (100 mL/Hr) IV Continuous <Continuous>  dextrose 5%. 1000 milliLiter(s) (50 mL/Hr) IV Continuous <Continuous>  dextrose 50% Injectable 25 Gram(s) IV Push once  dextrose 50% Injectable 12.5 Gram(s) IV Push once  dextrose 50% Injectable 25 Gram(s) IV Push once  glucagon  Injectable 1 milliGRAM(s) IntraMuscular once  heparin  Infusion.  Unit(s)/Hr (24 mL/Hr) IV Continuous <Continuous>  hydrALAZINE 25 milliGRAM(s) Oral three times a day  insulin lispro (ADMELOG) corrective regimen sliding scale   SubCutaneous three times a day before meals  insulin lispro (ADMELOG) corrective regimen sliding scale   SubCutaneous at bedtime  metoprolol succinate  milliGRAM(s) Oral daily  phytonadione  IVPB 10 milliGRAM(s) IV Intermittent once  pregabalin 25 milliGRAM(s) Oral two times a day  senna 2 Tablet(s) Oral at bedtime  sevelamer carbonate 1600 milliGRAM(s) Oral three times a day with meals    MEDICATIONS  (PRN):  bisacodyl 5 milliGRAM(s) Oral every 12 hours PRN Constipation  dextrose Oral Gel 15 Gram(s) Oral once PRN Blood Glucose LESS THAN 70 milliGRAM(s)/deciliter  heparin   Injectable 46094 Unit(s) IV Push every 6 hours PRN For aPTT less than 40  heparin   Injectable 5000 Unit(s) IV Push every 6 hours PRN For aPTT between 40 - 57  sodium chloride 0.9% lock flush 10 milliLiter(s) IV Push every 1 hour PRN Pre/post blood products, medications, blood draw, and to maintain line patency      Allergies    nitrofurantoin (Nephrotoxicity)    Intolerances      FAMILY HISTORY:  Family history of heart disease  father    Family history of CVA  mother      unless noted, no significant family hx with Mother, Father, Siblings    Vital Signs Last 24 Hrs  T(C): 36.8 (12 Apr 2022 14:10), Max: 36.8 (12 Apr 2022 10:15)  T(F): 98.2 (12 Apr 2022 14:10), Max: 98.2 (12 Apr 2022 10:15)  HR: 71 (12 Apr 2022 14:10) (71 - 91)  BP: 130/69 (12 Apr 2022 14:10) (130/69 - 165/68)  BP(mean): --  RR: 19 (12 Apr 2022 14:10) (18 - 19)  SpO2: 98% (12 Apr 2022 14:10) (93% - 98%)    General: WN/WD NAD  Extremities: No edema, 2+ RLE pulses, no bleeding from Shiley site    LABS:                        10.1   15.29 )-----------( 230      ( 12 Apr 2022 07:22 )             32.9     04-12    131<L>  |  92<L>  |  62<H>  ----------------------------<  188<H>  4.7   |  22  |  5.76<H>    Ca    8.9      12 Apr 2022 07:22      PT/INR - ( 12 Apr 2022 07:24 )   PT: 14.6 sec;   INR: 1.27 ratio         PTT - ( 12 Apr 2022 01:42 )  PTT:51.0 sec      RADIOLOGY & ADDITIONAL STUDIES:

## 2022-04-12 NOTE — PROGRESS NOTE ADULT - SUBJECTIVE AND OBJECTIVE BOX
Admitting Diagnosis:  Chronic kidney disease [N18.9]  CHRONIC KIDNEY DISEASE, UNSPECIFIED    Background:  This is a 67 year old gentleman pmhx CAD s/p MI/PCI/CABG, hx TIA, afib on rivaroxaban, HTN, DM2, PVD, gout, L CEA 2014, and CKD who presented to [facility] 4/11/22 with ANT, tremors, confusion and lethargy.  CT head no acute changes.  S/p initiation of hemodialysis.  Mental status has improved dramatically.  Pt denies any headaches, no slurred speech, no hemiparesis.  He has chronic gout with bilateral finger swelling, pain.  He also has chronic neuropathy.  Both legs are weak.      Interval Hx:  mental status dramatically improved.  Legs still quite weak    ******    Past Medical History:  HTN (hypertension), benign [401.1]  HLD (hyperlipidemia) [272.4]  DM type 2 (diabetes mellitus, type 2) [250.00]  TIA (transient ischemic attack) [435.9]  Atrial fibrillation [427.31]  MI (myocardial infarction) [410.90]  circa 2014 and 2022.  CAD (coronary artery disease) [414.00]  Neuropathy [G62.9]  Stage 3 chronic kidney disease [N18.30]  2019 novel coronavirus disease (COVID-19) [U07.1]  12/2021.  Received the COVID-19 vaccine x 2 as of April 2022.    Past Surgical History:  Status post angioplasty with stent [V45.82]  LULU x 3 2/7/2014  S/P carotid endarterectomy [Z98.89]  left  S/P CABG (coronary artery bypass graft) [Z95.1]        Social History:  No toxic habits    Family History:  FAMILY HISTORY:  Family history of heart disease  father    Family history of CVA  mother    Allergies:  nitrofurantoin (Nephrotoxicity)    ROS: as per HPI.  Advanced care planning reviewed and noted in the chart.      Medications:  acetaminophen   IVPB .. 1000 milliGRAM(s) IV Intermittent once  allopurinol 100 milliGRAM(s) Oral daily  aspirin enteric coated 81 milliGRAM(s) Oral daily  atorvastatin 40 milliGRAM(s) Oral at bedtime  chlorhexidine 2% Cloths 1 Application(s) Topical daily  chlorhexidine 4% Liquid 1 Application(s) Topical <User Schedule>  dextrose 5%. 1000 milliLiter(s) IV Continuous <Continuous>  dextrose 5%. 1000 milliLiter(s) IV Continuous <Continuous>  dextrose 50% Injectable 25 Gram(s) IV Push once  dextrose 50% Injectable 12.5 Gram(s) IV Push once  dextrose 50% Injectable 25 Gram(s) IV Push once  dextrose Oral Gel 15 Gram(s) Oral once PRN  diltiazem    milliGRAM(s) Oral daily  glucagon  Injectable 1 milliGRAM(s) IntraMuscular once  heparin   Injectable 78450 Unit(s) IV Push every 6 hours PRN  heparin   Injectable 5000 Unit(s) IV Push every 6 hours PRN  heparin  Infusion.  Unit(s)/Hr IV Continuous <Continuous>  hydrALAZINE 25 milliGRAM(s) Oral three times a day  insulin lispro (ADMELOG) corrective regimen sliding scale   SubCutaneous three times a day before meals  insulin lispro (ADMELOG) corrective regimen sliding scale   SubCutaneous at bedtime  metoprolol succinate  milliGRAM(s) Oral daily  sevelamer carbonate 1600 milliGRAM(s) Oral three times a day with meals  sodium chloride 0.9% lock flush 10 milliLiter(s) IV Push every 1 hour PRN      Labs:  CBC Full  -  ( 12 Apr 2022 07:22 )  WBC Count : 15.29 K/uL  RBC Count : 4.05 M/uL  Hemoglobin : 10.1 g/dL  Hematocrit : 32.9 %  Platelet Count - Automated : 230 K/uL  Mean Cell Volume : 81.2 fl  Mean Cell Hemoglobin : 24.9 pg  Mean Cell Hemoglobin Concentration : 30.7 gm/dL  Auto Neutrophil # : x  Auto Lymphocyte # : x  Auto Monocyte # : x  Auto Eosinophil # : x  Auto Basophil # : x  Auto Neutrophil % : x  Auto Lymphocyte % : x  Auto Monocyte % : x  Auto Eosinophil % : x  Auto Basophil % : x    04-12    131<L>  |  92<L>  |  62<H>  ----------------------------<  188<H>  4.7   |  22  |  5.76<H>    Ca    8.9      12 Apr 2022 07:22      CAPILLARY BLOOD GLUCOSE      POCT Blood Glucose.: 175 mg/dL (12 Apr 2022 12:05)  POCT Blood Glucose.: 185 mg/dL (12 Apr 2022 08:12)  POCT Blood Glucose.: 178 mg/dL (11 Apr 2022 20:54)  POCT Blood Glucose.: 158 mg/dL (11 Apr 2022 17:01)      PT/INR - ( 12 Apr 2022 07:24 )   PT: 14.6 sec;   INR: 1.27 ratio         PTT - ( 12 Apr 2022 01:42 )  PTT:51.0 sec        Vitals:  Vital Signs Last 24 Hrs  T(C): 36.4 (12 Apr 2022 11:46), Max: 36.8 (12 Apr 2022 10:15)  T(F): 97.5 (12 Apr 2022 11:46), Max: 98.2 (12 Apr 2022 10:15)  HR: 76 (12 Apr 2022 11:46) (76 - 120)  BP: 157/51 (12 Apr 2022 11:46) (145/65 - 177/74)  BP(mean): --  RR: 18 (12 Apr 2022 11:46) (18 - 19)  SpO2: 95% (12 Apr 2022 11:46) (93% - 97%)    NEUROLOGICAL EXAM:    Mental status: Awake, alert, and in no apparent distress. Oriented to person, place, and time.  Language intact.  Attention grossly intact.  No neglect    Cranial Nerves: Pupils were equal, round, reactive to light. Extraocular movements were intact. B BTT. Fundoscopic exam was deferred. Facial sensation was intact to light touch. There was no facial asymmetry. The palate was upgoing symmetrically and tongue was midline. Hearing acuity was intact to finger rub AU. Shoulder shrug was full bilaterally    Motor exam: Bulk and tone were normal. No downward drift in arms.  both legs plane of bed, though in part limited by edema, PVD, and neuropathy.  Fine finger movements were symmetric. There was bilateral symmetric pronator drift    Reflexes: DTRs depressed, flexor plantars.     Sensation: Intact to light touch, temperature.    Coordination: Finger-nose-finger intact.     Gait: deferred    Imaging:    ACC: [id number] EXAM:  CT BRAIN                        PROCEDURE DATE:  04/09/2022      INTERPRETATION:  CLINICAL INFORMATION:  Altered mental status, on   anticoagulation .    TECHNIQUE: Multiple contiguous axial images were acquired from the   skullbase to the vertex without the administration of intravenous   contrast.  Coronal and sagittal reformations were made.    COMPARISON: CT head 10/21/2021, brain MRI 02/23/2014.    FINDINGS:    Scattered lacunar infarcts in the bilateral cerebralhemispheres are   grossly stable compared to the 10/21/2021 head CT. No new additional   infarcts are noted over the time interval.    No acute intracranial hemorrhage, mass effect, or midline shift. The   brain demonstrates periventricular hypoattenuation, nonspecific in   etiology but likely representing chronic microvascular ischemic changes.    No abnormal extra-axial fluid collections are present.    The ventricles and sulci demonstrate age appropriate involutional   changes.  The basal cisterns are patent.    The orbits are unremarkable. The paranasal sinuses are clear. The mastoid   air cells are clear. The calvarium is intact.    IMPRESSION:    No acute intracranial abnormality is noted. If the patient has new and   persistent symptoms, consider short interval follow-up head CT or brain   MRI.    --- End of Report ---    GATITO VILLARREAL MD; Resident Radiologist  This document has been electronically signed.  JESSE CORONA MD; Attending Radiologist  This document has been electronically signed. Apr 9 2022  2:00PM

## 2022-04-12 NOTE — PROVIDER CONTACT NOTE (OTHER) - ASSESSMENT
pts initial VS during RRT: /60, HR 60, RR 18, o2 100% on room air. Pt AOx3 (baseline), pt remained alert, and no LOC

## 2022-04-12 NOTE — CONSULT NOTE ADULT - ASSESSMENT
67M with PMH of CAD with past multiple PCI, with most recent discharge from [facility] in Feb 2022 for NSTEMI with LLUU of an 85% prox LAD lesion with patient on ASA and now off Plavix per cardiology (only for one month), chronic afib on Pradaxa, HTN, type 2 DM on insulin, diabetic neuropathy with chronic RIGHT LE venous stasis changes>left, LEFT foot ulcer seen by podiatry, past endarterectomy LEFT CEA in 2014 who presented with UTI with hematuria diagnosed at urgent care and now with decreased urine output. Pt had Shiley placed and line was pulled earlier this evening. Vascular surgery consulted for bleeding from site.    PLAN:  - Pt was seen, examined and discussed with Dr. Cunha, vascular surgery fellow  - Recommended 30 minutes of pinpoint pressure  - Follow up labs and coags  - Will follow    Aurelio Gentile, PGY-2   Vascular Surgery  [id number]

## 2022-04-12 NOTE — PROGRESS NOTE ADULT - ASSESSMENT
67 year old gentleman with PMH of CAD, NSTEMI s/p 3 stents in 2014 (stents to LAD, proximal and distal LCx), ischemic cardiomyopathy, HTN for 10 years, T2DM for 26 years c/b peripheral neuropathy, CVA, TIA, Afib on Pradaxa, chronic RLE wound, L carotid stenosis s/p endarterectomy (2014) just recently admitted  to the [facility] on 2/19/2022 with acute onset chest pain for one day found to have an NSTEMI, CAD, s/p PCI in setting of increased AF rates off bb for 3 days and resolved chest pain, his CKMB peaked and Echo noted with EF 60%,normal LV function, mild TR, mild CA. He was s/p [facility] with 85% proximal LAD s/p balloon angioplasty and LULU. He presented to [facility] ED with decreased urine output over the week. Mr. Stevens went to [facility] for hematuria and was started  on Nitrofurantoin. Pt is still taking Nitrofurantoin w/ 5 days left. He came to the ED due to worsening symptoms. Pt reports having a similar incident 4-5 years ago that resolved spontaneously. He reports increased leg edema Dyspnea worse on exertion. his baseline Serum creatinine is in the 2.0 to 2.3 mg/dl range. ANT with serum creatinine of 8.29 with bun 144 and k 5.5        1- ANT on ckd III   2- chf chronic   3- hyperkalemia  on hd   4- uremia improving   5- hyperphosphatemia         ANT likely due to ATN however etiology of his ATN unclear ? due to infection recently vs other etiologies   will obtain serologies for now to see if underlying dx may be different based on serological findings   to have c3/c4/yari anca anti gbm spep/ipep bence jones - pending  HD in am  cont renvela with meals but increase to 2 tab with meals   strict I/O  to have R groin catheter removed.   trend creatinine and electrolytes daily

## 2022-04-12 NOTE — PROGRESS NOTE ADULT - NS ATTEND AMEND GEN_ALL_CORE FT
hd in am   team removed hd femoral cath and pt had prolonged bleeding seen by vascular as well   d/w cards and agree with DDAVP 24 mcg iv

## 2022-04-12 NOTE — PROGRESS NOTE ADULT - SUBJECTIVE AND OBJECTIVE BOX
CARDIOLOGY FOLLOW UP - Dr. Mazariegos  DATE OF SERVICE: 4/12/22     CC no cp or sob   more awake   c/o fatigue       REVIEW OF SYSTEMS:  CONSTITUTIONAL: No fever, weight loss, or fatigue  RESPIRATORY: No cough, wheezing, chills or hemoptysis; No Shortness of Breath  CARDIOVASCULAR: No chest pain, palpitations, passing out, dizziness, or leg swelling  GASTROINTESTINAL: No abdominal or epigastric pain. No nausea, vomiting, or hematemesis; No diarrhea or constipation. No melena or hematochezia.  VASCULAR: No edema     PHYSICAL EXAM:  T(C): 36.8 (04-12-22 @ 10:15), Max: 36.8 (04-12-22 @ 10:15)  HR: 91 (04-12-22 @ 10:15) (76 - 120)  BP: 145/65 (04-12-22 @ 10:15) (145/65 - 177/74)  RR: 18 (04-12-22 @ 10:15) (18 - 19)  SpO2: 94% (04-12-22 @ 10:15) (93% - 97%)  Wt(kg): --  I&O's Summary    11 Apr 2022 07:01  -  12 Apr 2022 07:00  --------------------------------------------------------  IN: 480 mL / OUT: 3000 mL / NET: -2520 mL        Appearance: Normal	  Cardiovascular: Normal S1 S2, irreg   Respiratory: Lungs clear to auscultation	  Gastrointestinal:  Soft, Non-tender, + BS	  Extremities:  le edema +       Home Medications:  allopurinol 100 mg oral tablet: 1 tab(s) orally once a day (03 Apr 2022 06:34)  aspirin 81 mg oral delayed release tablet: 1 tab(s) orally once a day (03 Apr 2022 06:34)  bumetanide 2 mg oral tablet: 1 tab(s) orally once a day (03 Apr 2022 06:34)  insulin glargine 100 units/mL subcutaneous solution: 25 unit(s) subcutaneous once a day (at bedtime) (03 Apr 2022 06:34)  metOLazone 5 mg oral tablet: 1 tab(s) orally 3 times a week (03 Apr 2022 06:34)  metoprolol succinate 50 mg oral tablet, extended release: 2 tab(s) orally once a day (03 Apr 2022 06:34)  NovoLOG 100 units/mL subcutaneous solution: subcutaneous 4 times a day (before meals and at bedtime) (03 Apr 2022 06:34)  NovoLOG FlexPen 100 units/mL injectable solution: 10 unit(s) subcutaneous 3 times a day (03 Apr 2022 06:34)  oxycodone-acetaminophen 5 mg-325 mg oral tablet: 1 tab(s) orally 2 times a day (03 Apr 2022 06:34)  Pradaxa 150 mg oral capsule: 1 cap(s) orally 2 times a day (03 Apr 2022 06:34)  pregabalin 100 mg oral capsule: 1 cap(s) orally 3 times a day (03 Apr 2022 06:34)      MEDICATIONS  (STANDING):  allopurinol 100 milliGRAM(s) Oral daily  aspirin enteric coated 81 milliGRAM(s) Oral daily  atorvastatin 40 milliGRAM(s) Oral at bedtime  chlorhexidine 2% Cloths 1 Application(s) Topical daily  chlorhexidine 4% Liquid 1 Application(s) Topical <User Schedule>  dextrose 5%. 1000 milliLiter(s) (100 mL/Hr) IV Continuous <Continuous>  dextrose 5%. 1000 milliLiter(s) (50 mL/Hr) IV Continuous <Continuous>  dextrose 50% Injectable 25 Gram(s) IV Push once  dextrose 50% Injectable 12.5 Gram(s) IV Push once  dextrose 50% Injectable 25 Gram(s) IV Push once  diltiazem Infusion 10 mG/Hr (10 mL/Hr) IV Continuous <Continuous>  glucagon  Injectable 1 milliGRAM(s) IntraMuscular once  heparin  Infusion.  Unit(s)/Hr (24 mL/Hr) IV Continuous <Continuous>  hydrALAZINE Injectable 10 milliGRAM(s) IV Push four times a day  insulin lispro (ADMELOG) corrective regimen sliding scale   SubCutaneous three times a day before meals  insulin lispro (ADMELOG) corrective regimen sliding scale   SubCutaneous at bedtime  metoprolol succinate  milliGRAM(s) Oral daily  sevelamer carbonate 1600 milliGRAM(s) Oral three times a day with meals      TELEMETRY: afib hr 80s 	    ECG:  	  RADIOLOGY:   DIAGNOSTIC TESTING:  [ ] Echocardiogram:  [ ]  Catheterization:  [ ] Stress Test:    OTHER: 	    LABS:	 	                            10.1   15.29 )-----------( 230      ( 12 Apr 2022 07:22 )             32.9     04-12    131<L>  |  92<L>  |  62<H>  ----------------------------<  188<H>  4.7   |  22  |  5.76<H>    Ca    8.9      12 Apr 2022 07:22      PT/INR - ( 12 Apr 2022 07:24 )   PT: 14.6 sec;   INR: 1.27 ratio         PTT - ( 12 Apr 2022 01:42 )  PTT:51.0 sec

## 2022-04-12 NOTE — PROGRESS NOTE ADULT - SUBJECTIVE AND OBJECTIVE BOX
[facility], Division of Infectious Diseases  ADRIANA Seals Y. Patel, S. Shah, [facility]  [phone]    Name: DYLAN DEL CASTILLO  Age: 67y  Gender: Male  MRN: [id number]    Interval History:    Antibiotics:      Medications:  allopurinol 100 milliGRAM(s) Oral daily  aspirin enteric coated 81 milliGRAM(s) Oral daily  atorvastatin 40 milliGRAM(s) Oral at bedtime  chlorhexidine 2% Cloths 1 Application(s) Topical daily  dextrose 5%. 1000 milliLiter(s) IV Continuous <Continuous>  dextrose 5%. 1000 milliLiter(s) IV Continuous <Continuous>  dextrose 50% Injectable 25 Gram(s) IV Push once  dextrose 50% Injectable 12.5 Gram(s) IV Push once  dextrose 50% Injectable 25 Gram(s) IV Push once  dextrose Oral Gel 15 Gram(s) Oral once PRN  diltiazem Infusion 10 mG/Hr IV Continuous <Continuous>  glucagon  Injectable 1 milliGRAM(s) IntraMuscular once  heparin   Injectable 02303 Unit(s) IV Push every 6 hours PRN  heparin   Injectable 34152 Unit(s) IV Push every 6 hours PRN  heparin   Injectable 5000 Unit(s) IV Push every 6 hours PRN  heparin  Infusion. 2200 Unit(s)/Hr IV Continuous <Continuous>  hydrALAZINE Injectable 10 milliGRAM(s) IV Push four times a day  insulin lispro (ADMELOG) corrective regimen sliding scale   SubCutaneous three times a day before meals  insulin lispro (ADMELOG) corrective regimen sliding scale   SubCutaneous at bedtime  metoprolol succinate  milliGRAM(s) Oral daily  sevelamer carbonate 1600 milliGRAM(s) Oral three times a day with meals      Review of Systems:  Review of systems otherwise negative except as previously noted.    Allergies: nitrofurantoin (Nephrotoxicity)    For details regarding the patient's past medical history, social history, family history, and other miscellaneous elements, please refer the initial infectious diseases consultation and/or the admitting history and physical examination for this admission.    Objective:  Vitals:   T(C): 36.7 (04-12-22 @ 04:30), Max: 36.7 (04-12-22 @ 04:30)  HR: 79 (04-12-22 @ 04:30) (76 - 120)  BP: 164/79 (04-12-22 @ 04:30) (146/81 - 177/74)  RR: 18 (04-12-22 @ 04:30) (18 - 19)  SpO2: 96% (04-12-22 @ 04:30) (93% - 97%)    Physical Examination:      Laboratory Studies:  CBC:                       10.1   15.29 )-----------( 230      ( 12 Apr 2022 07:22 )             32.9     CMP: 04-12    131<L>  |  92<L>  |  62<H>  ----------------------------<  188<H>  4.7   |  22  |  5.76<H>    Ca    8.9      12 Apr 2022 07:22            Microbiology: reviewed    Culture - Blood (collected 04-04-22 @ 17:46)  Source: .Blood Blood-Peripheral  Final Report (04-09-22 @ 18:00):    No Growth Final    Culture - Blood (collected 04-04-22 @ 17:46)  Source: .Blood Blood-Peripheral  Final Report (04-09-22 @ 18:00):    No Growth Final    Culture - Urine (collected 04-03-22 @ 04:11)  Source: Clean Catch Clean Catch (Midstream)  Final Report (04-04-22 @ 12:00):    <10,000 CFU/mL Normal Urogenital Tiffany        Radiology: reviewed       [facility], Division of Infectious Diseases  ADRIANA Seals Y. Patel, S. Shah, [facility]  [phone]    Name: DYLAN DEL CASTILLO  Age: 67y  Gender: Male  MRN: [id number]    Interval History:  Pt seen and examined at bedside  more alert  Notes reviewed    Antibiotics:      Medications:  allopurinol 100 milliGRAM(s) Oral daily  aspirin enteric coated 81 milliGRAM(s) Oral daily  atorvastatin 40 milliGRAM(s) Oral at bedtime  chlorhexidine 2% Cloths 1 Application(s) Topical daily  dextrose 5%. 1000 milliLiter(s) IV Continuous <Continuous>  dextrose 5%. 1000 milliLiter(s) IV Continuous <Continuous>  dextrose 50% Injectable 25 Gram(s) IV Push once  dextrose 50% Injectable 12.5 Gram(s) IV Push once  dextrose 50% Injectable 25 Gram(s) IV Push once  dextrose Oral Gel 15 Gram(s) Oral once PRN  diltiazem Infusion 10 mG/Hr IV Continuous <Continuous>  glucagon  Injectable 1 milliGRAM(s) IntraMuscular once  heparin   Injectable 72244 Unit(s) IV Push every 6 hours PRN  heparin   Injectable 64432 Unit(s) IV Push every 6 hours PRN  heparin   Injectable 5000 Unit(s) IV Push every 6 hours PRN  heparin  Infusion. 2200 Unit(s)/Hr IV Continuous <Continuous>  hydrALAZINE Injectable 10 milliGRAM(s) IV Push four times a day  insulin lispro (ADMELOG) corrective regimen sliding scale   SubCutaneous three times a day before meals  insulin lispro (ADMELOG) corrective regimen sliding scale   SubCutaneous at bedtime  metoprolol succinate  milliGRAM(s) Oral daily  sevelamer carbonate 1600 milliGRAM(s) Oral three times a day with meals      Review of Systems:  Review of systems otherwise negative except as previously noted.    Allergies: nitrofurantoin (Nephrotoxicity)    For details regarding the patient's past medical history, social history, family history, and other miscellaneous elements, please refer the initial infectious diseases consultation and/or the admitting history and physical examination for this admission.    Objective:  Vitals:   T(C): 36.7 (04-12-22 @ 04:30), Max: 36.7 (04-12-22 @ 04:30)  HR: 79 (04-12-22 @ 04:30) (76 - 120)  BP: 164/79 (04-12-22 @ 04:30) (146/81 - 177/74)  RR: 18 (04-12-22 @ 04:30) (18 - 19)  SpO2: 96% (04-12-22 @ 04:30) (93% - 97%)    Physical Examination:  General: no acute distress  HEENT: NC/AT, EOMI  Neck: supple, no palpable LAD  Cardio: S1, S2 heard, RRR, no murmurs  Resp: decreased b/l breath sounds  Abd: soft, NT, ND  Ext: no edema or cyanosis  Skin: warm, dry  Lines: R kariin magalie    Laboratory Studies:  CBC:                       10.1   15.29 )-----------( 230      ( 12 Apr 2022 07:22 )             32.9     CMP: 04-12    131<L>  |  92<L>  |  62<H>  ----------------------------<  188<H>  4.7   |  22  |  5.76<H>    Ca    8.9      12 Apr 2022 07:22            Microbiology: reviewed    Culture - Blood (collected 04-04-22 @ 17:46)  Source: .Blood Blood-Peripheral  Final Report (04-09-22 @ 18:00):    No Growth Final    Culture - Blood (collected 04-04-22 @ 17:46)  Source: .Blood Blood-Peripheral  Final Report (04-09-22 @ 18:00):    No Growth Final    Culture - Urine (collected 04-03-22 @ 04:11)  Source: Clean Catch Clean Catch (Midstream)  Final Report (04-04-22 @ 12:00):    <10,000 CFU/mL Normal Urogenital Tiffany        Radiology: reviewed

## 2022-04-12 NOTE — PROGRESS NOTE ADULT - ASSESSMENT
·  Problem: Acute kidney injury superimposed on CKD. pt currently on hd  to cont as per renal  perm cath done  pulm / cards f.u    Lethargy improved  - CT head no acute event       Problem/Plan - 2:  ·  Problem: Chronic atrial fibrillation. c/w a/c  cards f/u     Problem/Plan - 3:  ·  Problem: Cystitis.   treatment as per id  s/p abs     Problem/Plan - 4:  ·  Problem: Type 2 diabetes mellitus. c/w insulin   endo f/u     Problem/Plan - 5:  ·  Problem: CAD (coronary artery disease).   stable  cards f/u   .   Presently clinically stable.

## 2022-04-12 NOTE — CHART NOTE - NSCHARTNOTEFT_GEN_A_CORE
Dr Crowell requested to remove femoral shiley catheter , ptn s/p Rt internal jugular catheter placed this morning in IR .

## 2022-04-12 NOTE — PROGRESS NOTE ADULT - ASSESSMENT
Impression:  67 year old gentleman pmhx CAD s/p MI/PCI/CABG, hx TIA, afib on pradaxa, HTN, DM2, PVD, gout, L CEA 2014, CKD admitted to ED with ANT, tremors, confusion and lethargy. Receiving new hemodialysis. CT head no acute changes. Mental status improved today. Pt denies any headaches, no slurred speech, no focal facial or limb weakness. Has chronic gout with bilateral finger swelling, pain.     Diagnosis:  Presentation consistent with toxic metabolic encephalopathy secondary to ANT/CKD improving with dialysis, CT head no acute changes, no focal findings on exam, no evidence of acute neurological event at this time.     Recommendations:  Continue HD as per renal  Continue supportive care per primary team  Continue stroke prophylaxis- on AC for afib, antiplatelet, statin, optimize hypertension/glycemic control  No further inpatient neurological tests at this time.   d/w patient and son at bedside

## 2022-04-12 NOTE — PROGRESS NOTE ADULT - ASSESSMENT
A/P    66 y/o M with history of CAD, s/p multiple PCI, with most recent 2/22 PCI of LAD in setting of NSTEMI, on ASA only (pradaxa), chronic atrial fibrillation maintained on Pradaxa, essential HTN, type 2 DM previously on oral medications but on insulin, diabetic neuropathy with chronic RIGHT LE venous stasis changes>left, LEFT foot ulcer seen by podiatry, past endarterectomy LEFT CEA in 2014 presenting with 1 week of decreased urine output, cystitis with hematuria     #ANT on CKD, new HD  -oliguric renal failure in setting of ? UTI/cystitis, r/o obstruction ? secondary to abx   -worsened with diuretic use but unlikely due to hypovolemia alone from diuretic use   -hyperkalemia improved   -New HD for uremia, renal failure  -renal f/u   -await HD access     #AMS/Lethargy  -sig improved post hd   -likely 2/2 Uremic encephalopathy   -ABG noted  -med f/u   -MICU eval noted 4/5  -head ct ok    #Acute on chronic HFpEF  -remains overloaded but improved  -continue aggressive volume removal with HD  -now tolerating po - c/w  bb    #Chronic AF  -rates improved  -c/w metoprolol succ 100 mg qd  -Give dilt cd 120mg today    -d/c iv dilt drip after hd access placed  - hep gtt on hold for IR today-- resume ac per IR     #HTN  -tolerating PO--- c/w po metoprolol  -tart po dilt Today   - DC IV hydral --- Start hydral po today     #CAD, s/p PCI, most recent 2/2022  -stable, cont ASA, off plavix due to a/c indication  -statin     #R carotid stenosis  -cont med tx  -MRA noted with Greater than 75% stenosis of the right distal common carotid artery. Approximately 60% stenosis of the proximal left internal carotid artery.  -Continue ASA and Statin Therapy  -vasc outpt f/u Dr Pinto    ACP- Advanced Care Planning  -Advanced care planning discussed with patient. Advanced care planning forms discussed with patient and/or family.  Risks, benefits, and alternatives of medical/cardiac procedures were discussed in detail with all questions answered.  30 minutes were spent addressing advance care planning.

## 2022-04-12 NOTE — PRE PROCEDURE NOTE - PRE PROCEDURE EVALUATION
Interventional Radiology    HPI: 67 year old male with ANT on CKD on HD via femoral HD catheter (placed by vascular) referred to IR for IJ non-tunneled HD catheter placement. Last HD yesterday.     Allergies: nitrofurantoin (Nephrotoxicity)    Medications (Abx/Cardiac/Anticoagulation/Blood Products)  aspirin enteric coated: 81 milliGRAM(s) Oral (04-11 @ 12:29)  diltiazem Infusion: 10 mL/Hr IV Continuous (04-11 @ 12:32)  heparin  Infusion.: 2800 Unit(s)/Hr IV Continuous (04-12 @ 02:31)  heparin  Infusion.: 2200 Unit(s)/Hr IV Continuous (04-10 @ 19:34)  hydrALAZINE Injectable: 10 milliGRAM(s) IV Push (04-12 @ 06:40)  metoprolol succinate ER: 100 milliGRAM(s) Oral (04-12 @ 06:40)  metoprolol tartrate Injectable: 7.5 milliGRAM(s) IV Push (04-11 @ 13:52)    Data:    T(C): 36.7  HR: 79  BP: 164/79  RR: 18  SpO2: 96%      -WBC 15.29 / HgB 10.1 / Hct 32.9 / Plt 230  -Na 131 / Cl 92 / BUN 62 / Glucose 188  -K 4.7 / CO2 22 / Cr 5.76  -ALT -- / Alk Phos -- / T.Bili --  -INR1.27    Plan: 67y Male presents for shiley placement   -Risks/Benefits/alternatives explained with the patient and/or healthcare proxy and witnessed informed consent obtained from patient's wife Cici Stevens @ [phone].

## 2022-04-12 NOTE — CHART NOTE - NSCHARTNOTEFT_GEN_A_CORE
Patient s/p right IJ shiley today and right groin to be removed . Patient shiley was removed as per protocol however after catheter was removed and large amount of bleeding persists despite pressure applied. Simultaneously noted left arm where IV site was placed and removed  also with profuse bleeding . As per vascular to apply pinpoint pressure for 30 minutes over right groin . Thereafter Bleeding stopped with pressure applied and no further bleeding occurred. Writer spoke with Dr Crowell and Dr madrigal, heparin gtt was stopped as advised by cardiology and DDAVP 24 mcgs x1 as per Dr Crowell. CBC q12 hours , IF stable can resumed heparin gtt in am as per cardiology. Dr Corral aware Patient s/p placement of  right IJ shiley today in IR and right groin was requested by nephrology to be removed after access catheter was placed in IR . Patient's shiley was removed as per protocol however after shiley catheter was removed from rt groin a large amount of bleeding sprouted  profusely . Pressure was immediately applied, ptn was hemodynamically stable throughout the event . Simultaneously noted left arm where IV site was placed and removed  also with profuse bleeding . RRT was called due excessive amount of bleeding and unable to stop bleeding site. During RRT vascular was consulted. Bleeding was stopped upon arrival of vascular team .  As per vascular continue to apply pinpoint pressure for 30 minutes over right groin .  Writer spoke with Dr Crowell and Dr madrigal, they suggested to keep heparin gtt off  as advised by cardiology and DDAVP 24 mcgs x1 given as per Dr Crowell. CBC q12 hours , IF stable can resumed heparin gtt in am as per cardiology.

## 2022-04-13 NOTE — PROGRESS NOTE ADULT - ASSESSMENT
67 year old gentleman with PMH of CAD, NSTEMI s/p 3 stents in 2014 (stents to LAD, proximal and distal LCx), ischemic cardiomyopathy, HTN for 10 years, T2DM for 26 years c/b peripheral neuropathy, CVA, TIA, Afib on Pradaxa, chronic RLE wound, L carotid stenosis s/p endarterectomy (2014) just recently admitted  to the [facility] on 2/19/2022 with acute onset chest pain for one day found to have an NSTEMI, CAD, s/p PCI in setting of increased AF rates off bb for 3 days and resolved chest pain, his CKMB peaked and Echo noted with EF 60%,normal LV function, mild TR, mild ID. He was s/p [facility] with 85% proximal LAD s/p balloon angioplasty and LULU. He presented to [facility] ED with decreased urine output over the week. [facility]. Stevens went to [facility] for hematuria and was started  on Nitrofurantoin. Pt is still taking Nitrofurantoin w/ 5 days left. He came to the ED due to worsening symptoms. Pt reports having a similar incident 4-5 years ago that resolved spontaneously. He reports increased leg edema Dyspnea worse on exertion. his baseline Serum creatinine is in the 2.0 to 2.3 mg/dl range. ANT with serum creatinine of 8.29 with bun 144 and k 5.5        1- ANT on ckd III   2- chf chronic   3- hyperkalemia  on hd   4- uremia improving   5- hyperphosphatemia         ANT likely due to ATN however etiology of his ATN unclear ? due to infection recently vs other etiologies    c3/c4/yari anca anti gbm spep/ipep bence jones  plan for HD today  F180, 240 min, , , 3L fluid removal  cont renvela  2 tab with meals   epogen 94223 U   IR to place permcath   will need avf during this admission   vascular to evaluate   strict I/O

## 2022-04-13 NOTE — PROGRESS NOTE ADULT - SUBJECTIVE AND OBJECTIVE BOX
[facility], Division of Infectious Diseases  ADRIANA Seals Y. Patel, S. Shah, G. [facility]  [phone]    Name: DYLAN DEL CASTILLO  Age: 67y  Gender: Male  MRN: [id number]    Interval History:  Patient seen and examined at bedside this morning  No acute overnight events. Afebrile  In bed  Son at bedside  Continued c/o of all over pain  Groin shiley removed  Notes reviewed    Antibiotics:      Medications:  allopurinol 100 milliGRAM(s) Oral daily  aspirin enteric coated 81 milliGRAM(s) Oral daily  atorvastatin 40 milliGRAM(s) Oral at bedtime  bisacodyl 5 milliGRAM(s) Oral every 12 hours PRN  chlorhexidine 2% Cloths 1 Application(s) Topical daily  chlorhexidine 4% Liquid 1 Application(s) Topical <User Schedule>  dextrose 5%. 1000 milliLiter(s) IV Continuous <Continuous>  dextrose 5%. 1000 milliLiter(s) IV Continuous <Continuous>  dextrose 50% Injectable 25 Gram(s) IV Push once  dextrose 50% Injectable 12.5 Gram(s) IV Push once  dextrose 50% Injectable 25 Gram(s) IV Push once  dextrose Oral Gel 15 Gram(s) Oral once PRN  diltiazem    milliGRAM(s) Oral daily  epoetin naz-epbx (RETACRIT) Injectable 49619 Unit(s) IV Push once  glucagon  Injectable 1 milliGRAM(s) IntraMuscular once  heparin   Injectable 31132 Unit(s) IV Push every 6 hours PRN  heparin   Injectable 5000 Unit(s) IV Push every 6 hours PRN  heparin  Infusion.  Unit(s)/Hr IV Continuous <Continuous>  hydrALAZINE 25 milliGRAM(s) Oral three times a day  insulin lispro (ADMELOG) corrective regimen sliding scale   SubCutaneous three times a day before meals  insulin lispro (ADMELOG) corrective regimen sliding scale   SubCutaneous at bedtime  metoprolol succinate  milliGRAM(s) Oral daily  pregabalin 25 milliGRAM(s) Oral two times a day  senna 2 Tablet(s) Oral at bedtime  sevelamer carbonate 1600 milliGRAM(s) Oral three times a day with meals  sodium chloride 0.9% lock flush 10 milliLiter(s) IV Push every 1 hour PRN      Review of Systems:  Review of systems otherwise negative except as previously noted.    Allergies: nitrofurantoin (Nephrotoxicity)    For details regarding the patient's past medical history, social history, family history, and other miscellaneous elements, please refer the initial infectious diseases consultation and/or the admitting history and physical examination for this admission.    Objective:  Vitals:   T(C): 37.1 (04-13-22 @ 04:00), Max: 37.1 (04-13-22 @ 04:00)  HR: 88 (04-13-22 @ 04:00) (61 - 88)  BP: 145/60 (04-13-22 @ 04:00) (130/69 - 157/51)  RR: 19 (04-13-22 @ 04:00) (18 - 20)  SpO2: 93% (04-13-22 @ 04:00) (92% - 98%)    Physical Examination:  General: no acute distress  HEENT: NC/AT, EOM  Cardio: S1, S2 heard, RRR, no murmurs  Resp: breath sounds heard bilaterally, no rales, wheezes or rhonchi  Abd: soft, NT, ND,  Ext: no edema or cyanosis  Skin: warm, dry, no visible rash  Lines R permacath      Laboratory Studies:  CBC:                       8.9    11.34 )-----------( 190      ( 12 Apr 2022 19:22 )             29.0     CMP: 04-12    130<L>  |  93<L>  |  71<H>  ----------------------------<  168<H>  4.5   |  22  |  6.56<H>    Ca    8.8      12 Apr 2022 19:22            Microbiology: reviewed    Culture - Blood (collected 04-04-22 @ 17:46)  Source: .Blood Blood-Peripheral  Final Report (04-09-22 @ 18:00):    No Growth Final    Culture - Blood (collected 04-04-22 @ 17:46)  Source: .Blood Blood-Peripheral  Final Report (04-09-22 @ 18:00):    No Growth Final    Culture - Urine (collected 04-03-22 @ 04:11)  Source: Clean Catch Clean Catch (Midstream)  Final Report (04-04-22 @ 12:00):    <10,000 CFU/mL Normal Urogenital Tiffany        Radiology: reviewed

## 2022-04-13 NOTE — PROGRESS NOTE ADULT - PROBLEM SELECTOR PLAN 1
Likely 2nd to b/l pl effusions, atelectasis  -Suspect fluid overload given elevated proBNP, LE edema on admission   -Keep O>I with HD as tolerated   -Pt with hx of hydroPTX. CT chest in 2/2022 with pleural thickening, atelectasis. Findings at the time suspected to be chronic. CT A/P 4/2 with small b/l pl effusions L>R, pleural thickening  -CT chest now with small b/l pl effusion R>L, bibasilar atelectasis  -Hypoxia resolved, SOB resolved, keep sats >90% with supplemental O2 as needed.

## 2022-04-13 NOTE — PROGRESS NOTE ADULT - ASSESSMENT
Patient is a 67 year old male with PMH of CAD with past multiple PCI, with most recent discharge from [place] in Feb 2022 for NSTEMI with LULU of an 85% prox LAD lesion with patient on ASA and now off Plavix per cardiology (only for one month), chronic afib on Pradaxa, HTN, type 2 DM on insulin, diabetic neuropathy with chronic RIGHT LE venous stasis changes>left, LEFT foot ulcer seen by podiatry, past endarterectomy LEFT CEA in 2014 who presented with UTI with hematuria diagnosed at urgent care and now with decreased urine output.     Acute cystitis/UTI    - noted dark urine with hematuria, went to  and was prescribed macrobid, had some improvement in urine color but then noticed decreased urine output with no voiding reported in last 2 days   - UA here with pyuria -  with large LE, negative nitrites and no bacteria    - CTAP reviewed - no hydronephrosis, nephrolithiasis, or evidence of obstructive uropathy   - urine culture negative   - blood cultures negative   - s/p ceftriaxone x7 day course   - monitor off Abx   - monitor temps/WBC --stable    ANT on CKD3   - Nephrology following, appreciate recs   - s/p placement of shiley and initiation of HD   - pending permacath placement   - monitor Cr    - renally dose meds     B/l LE edema with chronic venous stasis  Acute on chronic HFpEF   - legs cool to touch, nontender, chronic changes with no sign of infection/cellulitis   - has wound on bottom of L foot - dry and does not appear infected either   - clinically overloaded, Cardiology and Nephrology following, on IV bumex, for HD   - elevate lower extremities      DM2 with neuropathy   - Blood glucose management per primary team.    Infectious Diseases will continue to follow. Please call with any questions.   Annie King M.D.  [facility], Division of Infectious Diseases [phone]

## 2022-04-13 NOTE — PROGRESS NOTE ADULT - SUBJECTIVE AND OBJECTIVE BOX
Follow-up Pulm Progress Note    Alert, wants to get OOB   No new respiratory events overnight.  Denies SOB/CP.   Sats 94% RA    Medications:  MEDICATIONS  (STANDING):  allopurinol 100 milliGRAM(s) Oral daily  aspirin enteric coated 81 milliGRAM(s) Oral daily  atorvastatin 40 milliGRAM(s) Oral at bedtime  chlorhexidine 2% Cloths 1 Application(s) Topical daily  chlorhexidine 4% Liquid 1 Application(s) Topical <User Schedule>  dextrose 5%. 1000 milliLiter(s) (100 mL/Hr) IV Continuous <Continuous>  dextrose 5%. 1000 milliLiter(s) (50 mL/Hr) IV Continuous <Continuous>  dextrose 50% Injectable 25 Gram(s) IV Push once  dextrose 50% Injectable 12.5 Gram(s) IV Push once  dextrose 50% Injectable 25 Gram(s) IV Push once  diltiazem    milliGRAM(s) Oral daily  epoetin naz-epbx (RETACRIT) Injectable 96972 Unit(s) IV Push once  glucagon  Injectable 1 milliGRAM(s) IntraMuscular once  heparin  Infusion.  Unit(s)/Hr (24 mL/Hr) IV Continuous <Continuous>  hydrALAZINE 25 milliGRAM(s) Oral three times a day  insulin lispro (ADMELOG) corrective regimen sliding scale   SubCutaneous three times a day before meals  insulin lispro (ADMELOG) corrective regimen sliding scale   SubCutaneous at bedtime  metoprolol succinate  milliGRAM(s) Oral daily  pregabalin 25 milliGRAM(s) Oral two times a day  senna 2 Tablet(s) Oral at bedtime  sevelamer carbonate 1600 milliGRAM(s) Oral three times a day with meals    MEDICATIONS  (PRN):  bisacodyl 5 milliGRAM(s) Oral every 12 hours PRN Constipation  dextrose Oral Gel 15 Gram(s) Oral once PRN Blood Glucose LESS THAN 70 milliGRAM(s)/deciliter  heparin   Injectable 32537 Unit(s) IV Push every 6 hours PRN For aPTT less than 40  heparin   Injectable 5000 Unit(s) IV Push every 6 hours PRN For aPTT between 40 - 57  sodium chloride 0.9% lock flush 10 milliLiter(s) IV Push every 1 hour PRN Pre/post blood products, medications, blood draw, and to maintain line patency          Vital Signs Last 24 Hrs  T(C): 36.4 (13 Apr 2022 11:57), Max: 37.1 (13 Apr 2022 04:00)  T(F): 97.6 (13 Apr 2022 11:57), Max: 98.8 (13 Apr 2022 04:00)  HR: 89 (13 Apr 2022 11:57) (61 - 89)  BP: 145/63 (13 Apr 2022 11:57) (130/69 - 151/72)  BP(mean): --  RR: 20 (13 Apr 2022 11:57) (19 - 20)  SpO2: 92% (13 Apr 2022 11:57) (92% - 98%)          04-12 @ 07:01  -  04-13 @ 07:00  --------------------------------------------------------  IN: 240 mL / OUT: 0 mL / NET: 240 mL          LABS:                        8.7    12.24 )-----------( 202      ( 13 Apr 2022 11:27 )             28.2     04-12    130<L>  |  93<L>  |  71<H>  ----------------------------<  168<H>  4.5   |  22  |  6.56<H>    Ca    8.8      12 Apr 2022 19:22            CAPILLARY BLOOD GLUCOSE      POCT Blood Glucose.: 150 mg/dL (13 Apr 2022 08:31)    PT/INR - ( 12 Apr 2022 07:24 )   PT: 14.6 sec;   INR: 1.27 ratio         PTT - ( 12 Apr 2022 19:22 )  PTT:32.3 sec            CULTURES:     Culture - Blood (collected 04-04-22 @ 17:46)  Source: .Blood Blood-Peripheral  Final Report (04-09-22 @ 18:00):    No Growth Final    Culture - Blood (collected 04-04-22 @ 17:46)  Source: .Blood Blood-Peripheral  Final Report (04-09-22 @ 18:00):    No Growth Final        Physical Examination:  PULM: Decreased at bases  CVS: S1, S2 heard    RADIOLOGY REVIEWED  CXR 4/11: unchanged b/l effusions, atelectasis    CT chest: < from: CT Chest No Cont (04.04.22 @ 16:52) >  FINDINGS:    AIRWAYS, LUNGS, PLEURA: Tracheal secretions. Small left greater than   right pleural effusions increased compared to 2/19/2022. Right-sided   pleural thickening. Lingular and left greater than right basilar   opacification, likely atelectasis.    MEDIASTINUM: Cardiomegaly. No pericardial effusion. Thoracic aorta normal   caliber.  No large mediastinal lymph nodes.    IMAGED ABDOMEN: Nonspecific perinephric stranding.    SOFT TISSUES: Unremarkable.    BONES: Unremarkable.      IMPRESSION:.    Small left greater than right bilateral pleural effusions increased in   size compared to 2/19/2022.    Lingular and left greater than right basilar opacification, likely   atelectasis.    --- End of Report ---    < end of copied text >

## 2022-04-13 NOTE — PROGRESS NOTE ADULT - SUBJECTIVE AND OBJECTIVE BOX
Admitting Diagnosis:  Chronic kidney disease [N18.9]  CHRONIC KIDNEY DISEASE, UNSPECIFIED    Background:  This is a 67 year old gentleman pmhx CAD s/p MI/PCI/CABG, hx TIA, afib on rivaroxaban, HTN, DM2, PVD, gout, L CEA 2014, and CKD who presented to [facility] 4/11/22 with ANT, tremors, confusion and lethargy.  CT head no acute changes.  S/p initiation of hemodialysis.  Mental status has improved dramatically.  Pt denies any headaches, no slurred speech, no hemiparesis.  He has chronic gout with bilateral finger swelling, pain.  He also has chronic neuropathy.  Both legs are weak.      Interval Hx:  mental status continues to.  Legs still quite weak.  Deconditioned     ******    Past Medical History:  HTN (hypertension), benign [401.1]  HLD (hyperlipidemia) [272.4]  DM type 2 (diabetes mellitus, type 2) [250.00]  TIA (transient ischemic attack) [435.9]  Atrial fibrillation [427.31]  MI (myocardial infarction) [410.90]  circa 2014 and 2022.  CAD (coronary artery disease) [414.00]  Neuropathy [G62.9]  Stage 3 chronic kidney disease [N18.30]  2019 novel coronavirus disease (COVID-19) [U07.1]  12/2021.  Received the COVID-19 vaccine x 2 as of April 2022.    Past Surgical History:  Status post angioplasty with stent [V45.82]  LULU x 3 2/7/2014  S/P carotid endarterectomy [Z98.89]  left  S/P CABG (coronary artery bypass graft) [Z95.1]        Social History:  No toxic habits    Family History:  FAMILY HISTORY:  Family history of heart disease  father    Family history of CVA  mother    Allergies:  nitrofurantoin (Nephrotoxicity)    ROS: as per HPI.  Advanced care planning reviewed and noted in the chart.    Medications:  allopurinol 100 milliGRAM(s) Oral daily  aspirin enteric coated 81 milliGRAM(s) Oral daily  atorvastatin 40 milliGRAM(s) Oral at bedtime  bisacodyl 5 milliGRAM(s) Oral every 12 hours PRN  chlorhexidine 2% Cloths 1 Application(s) Topical daily  chlorhexidine 4% Liquid 1 Application(s) Topical <User Schedule>  dextrose 5%. 1000 milliLiter(s) IV Continuous <Continuous>  dextrose 5%. 1000 milliLiter(s) IV Continuous <Continuous>  dextrose 50% Injectable 25 Gram(s) IV Push once  dextrose 50% Injectable 12.5 Gram(s) IV Push once  dextrose 50% Injectable 25 Gram(s) IV Push once  dextrose Oral Gel 15 Gram(s) Oral once PRN  epoetin naz-epbx (RETACRIT) Injectable 27110 Unit(s) IV Push once  glucagon  Injectable 1 milliGRAM(s) IntraMuscular once  hydrALAZINE 25 milliGRAM(s) Oral three times a day  insulin lispro (ADMELOG) corrective regimen sliding scale   SubCutaneous three times a day before meals  insulin lispro (ADMELOG) corrective regimen sliding scale   SubCutaneous at bedtime  metoprolol succinate  milliGRAM(s) Oral daily  pregabalin 25 milliGRAM(s) Oral two times a day  senna 2 Tablet(s) Oral at bedtime  sevelamer carbonate 1600 milliGRAM(s) Oral three times a day with meals  sodium chloride 0.9% lock flush 10 milliLiter(s) IV Push every 1 hour PRN      Labs:  CBC Full  -  ( 12 Apr 2022 19:22 )  WBC Count : 11.34 K/uL  RBC Count : 3.47 M/uL  Hemoglobin : 8.9 g/dL  Hematocrit : 29.0 %  Platelet Count - Automated : 190 K/uL  Mean Cell Volume : 83.6 fl  Mean Cell Hemoglobin : 25.6 pg  Mean Cell Hemoglobin Concentration : 30.7 gm/dL  Auto Neutrophil # : x  Auto Lymphocyte # : x  Auto Monocyte # : x  Auto Eosinophil # : x  Auto Basophil # : x  Auto Neutrophil % : x  Auto Lymphocyte % : x  Auto Monocyte % : x  Auto Eosinophil % : x  Auto Basophil % : x    04-12    130<L>  |  93<L>  |  71<H>  ----------------------------<  168<H>  4.5   |  22  |  6.56<H>    Ca    8.8      12 Apr 2022 19:22      CAPILLARY BLOOD GLUCOSE      POCT Blood Glucose.: 150 mg/dL (13 Apr 2022 08:31)  POCT Blood Glucose.: 138 mg/dL (12 Apr 2022 21:15)  POCT Blood Glucose.: 164 mg/dL (12 Apr 2022 19:00)  POCT Blood Glucose.: 141 mg/dL (12 Apr 2022 17:13)  POCT Blood Glucose.: 175 mg/dL (12 Apr 2022 12:05)      PT/INR - ( 12 Apr 2022 07:24 )   PT: 14.6 sec;   INR: 1.27 ratio         PTT - ( 12 Apr 2022 19:22 )  PTT:32.3 sec        Vitals:  Vital Signs Last 24 Hrs  T(C): 37.1 (13 Apr 2022 04:00), Max: 37.1 (13 Apr 2022 04:00)  T(F): 98.8 (13 Apr 2022 04:00), Max: 98.8 (13 Apr 2022 04:00)  HR: 88 (13 Apr 2022 04:00) (61 - 88)  BP: 145/60 (13 Apr 2022 04:00) (130/69 - 157/51)  BP(mean): --  RR: 19 (13 Apr 2022 04:00) (18 - 20)  SpO2: 93% (13 Apr 2022 04:00) (92% - 98%)    NEUROLOGICAL EXAM:    Mental status: Awake, alert, and in no apparent distress. Oriented to person, place, and time.  Language intact.  Attention grossly intact.  No neglect    Cranial Nerves: Pupils were equal, round, reactive to light. Extraocular movements were intact. B BTT. Fundoscopic exam was deferred. Facial sensation was intact to light touch. There was no facial asymmetry. The palate was upgoing symmetrically and tongue was midline. Hearing acuity was intact to finger rub AU. Shoulder shrug was full bilaterally    Motor exam: Bulk and tone were normal. No downward drift in arms.  both legs plane of bed, though in part limited by edema, PVD, and neuropathy.  Fine finger movements were symmetric. There was bilateral symmetric pronator drift    Reflexes: DTRs depressed, flexor plantars.     Sensation: Intact to light touch, temperature.    Coordination: Finger-nose-finger intact.     Gait: deferred    Imaging:    ACC: [id number] EXAM:  CT BRAIN                        PROCEDURE DATE:  04/09/2022      INTERPRETATION:  CLINICAL INFORMATION:  Altered mental status, on   anticoagulation .    TECHNIQUE: Multiple contiguous axial images were acquired from the   skullbase to the vertex without the administration of intravenous   contrast.  Coronal and sagittal reformations were made.    COMPARISON: CT head 10/21/2021, brain MRI 02/23/2014.    FINDINGS:    Scattered lacunar infarcts in the bilateral cerebralhemispheres are   grossly stable compared to the 10/21/2021 head CT. No new additional   infarcts are noted over the time interval.    No acute intracranial hemorrhage, mass effect, or midline shift. The   brain demonstrates periventricular hypoattenuation, nonspecific in   etiology but likely representing chronic microvascular ischemic changes.    No abnormal extra-axial fluid collections are present.    The ventricles and sulci demonstrate age appropriate involutional   changes.  The basal cisterns are patent.    The orbits are unremarkable. The paranasal sinuses are clear. The mastoid   air cells are clear. The calvarium is intact.    IMPRESSION:    No acute intracranial abnormality is noted. If the patient has new and   persistent symptoms, consider short interval follow-up head CT or brain   MRI.    --- End of Report ---    GATITO VILLARREAL MD; Resident Radiologist  This document has been electronically signed.  JESSE CORONA MD; Attending Radiologist  This document has been electronically signed. Apr 9 2022  2:00PM

## 2022-04-13 NOTE — PROGRESS NOTE ADULT - SUBJECTIVE AND OBJECTIVE BOX
Interventional Radiology Follow-Up Note    Patient seen and examined @ bedside     This is a 67y Male s/p non tunneled HD catheter on 4/12 in Interventional Radiology.     No complaint offered.      Medication:  aspirin enteric coated: (04-12)  diltiazem   CD: (04-12)  diltiazem Infusion: (04-11)  heparin  Infusion.: (04-12)  heparin  Infusion.: (04-12)  hydrALAZINE: (04-13)  hydrALAZINE Injectable: (04-12)  metoprolol succinate ER: (04-13)  metoprolol tartrate Injectable: (04-11)    Vitals:  T(F): 98.8, Max: 98.8 (04:00)  HR: 88  BP: 145/60  RR: 19  SpO2: 93%    Physical Exam:  General: Nontoxic, in NAD.  Neck: right neck HD catheter site dressing c/d/i. No hematoma noted. No ttp        LABS:  Na: 130 (04-12 @ 19:22), 131 (04-12 @ 07:22), 133 (04-11 @ 07:07), 136 (04-10 @ 07:29)  K: 4.5 (04-12 @ 19:22), 4.7 (04-12 @ 07:22), 4.6 (04-11 @ 07:07), 4.3 (04-10 @ 07:29)  Cl: 93 (04-12 @ 19:22), 92 (04-12 @ 07:22), 92 (04-11 @ 07:07), 96 (04-10 @ 07:29)  CO2: 22 (04-12 @ 19:22), 22 (04-12 @ 07:22), 22 (04-11 @ 07:07), 25 (04-10 @ 07:29)  BUN: 71 (04-12 @ 19:22), 62 (04-12 @ 07:22), 71 (04-11 @ 07:07), 48 (04-10 @ 07:29)  Cr: 6.56 (04-12 @ 19:22), 5.76 (04-12 @ 07:22), 6.42 (04-11 @ 07:07), 4.71 (04-10 @ 07:29)  Glu: 168(04-12 @ 19:22), 188(04-12 @ 07:22), 225(04-11 @ 07:07), 172(04-10 @ 07:29)  Hgb: 8.9 (04-12 @ 19:22), 10.1 (04-12 @ 07:22), 9.3 (04-11 @ 18:28), 10.1 (04-11 @ 07:02), 10.2 (04-10 @ 07:29)  Hct: 29.0 (04-12 @ 19:22), 32.9 (04-12 @ 07:22), 30.1 (04-11 @ 18:28), 33.7 (04-11 @ 07:02), 33.2 (04-10 @ 07:29)  WBC: 11.34 (04-12 @ 19:22), 15.29 (04-12 @ 07:22), 17.29 (04-11 @ 18:28), 14.00 (04-11 @ 07:02), 12.17 (04-10 @ 07:29)  Plt: 190 (04-12 @ 19:22), 230 (04-12 @ 07:22), 173 (04-11 @ 18:28), 217 (04-11 @ 07:02), 175 (04-10 @ 07:29)  INR: 1.27 04-12-22 @ 07:24  PTT: 32.3 04-12-22 @ 19:22, 51.0 04-12-22 @ 01:42, 52.8 04-11-22 @ 18:28, 71.9 04-11-22 @ 07:02, 65.0 04-10-22 @ 07:32          Assessment/Plan:  67y Male admitted with Chronic kidney disease    Pt most recently s/p non tunneled HD catheter on 4/12 in Interventional Radiology     - Okay to use catheter.  - IR will sign off.     Please call IR at  [phone] with any questions, concerns, or issues regarding above.    Also available on Teams.

## 2022-04-13 NOTE — PROGRESS NOTE ADULT - SUBJECTIVE AND OBJECTIVE BOX
CARDIOLOGY FOLLOW UP - Dr. Mazariegos  DATE OF SERVICE: 4/13/22     CC no cp or increase SOB   -lethargic on exam   -fam at bedside   -events noted- sp rrt for uncontrolled bleeding after shiley removal  sp DDAVP 24 mcgs x1       REVIEW OF SYSTEMS:  CONSTITUTIONAL: No fever, weight loss, or fatigue  RESPIRATORY: No cough, wheezing, chills or hemoptysis; No Shortness of Breath  CARDIOVASCULAR: No chest pain, palpitations, passing out, dizziness, or leg swelling  GASTROINTESTINAL: No abdominal or epigastric pain. No nausea, vomiting, or hematemesis; No diarrhea or constipation. No melena or hematochezia.  VASCULAR: No edema     PHYSICAL EXAM:  T(C): 37.1 (04-13-22 @ 04:00), Max: 37.1 (04-13-22 @ 04:00)  HR: 88 (04-13-22 @ 04:00) (61 - 88)  BP: 145/60 (04-13-22 @ 04:00) (130/69 - 157/51)  RR: 19 (04-13-22 @ 04:00) (18 - 20)  SpO2: 93% (04-13-22 @ 04:00) (92% - 98%)  Wt(kg): --  I&O's Summary    12 Apr 2022 07:01  -  13 Apr 2022 07:00  --------------------------------------------------------  IN: 240 mL / OUT: 0 mL / NET: 240 mL        Appearance: Normal	  Cardiovascular: Normal S1 S2, irreg   Respiratory: diminished   Gastrointestinal:  Soft, Non-tender, + BS	  Extremities:  le edema ++       Home Medications:  allopurinol 100 mg oral tablet: 1 tab(s) orally once a day (03 Apr 2022 06:34)  aspirin 81 mg oral delayed release tablet: 1 tab(s) orally once a day (03 Apr 2022 06:34)  bumetanide 2 mg oral tablet: 1 tab(s) orally once a day (03 Apr 2022 06:34)  insulin glargine 100 units/mL subcutaneous solution: 25 unit(s) subcutaneous once a day (at bedtime) (03 Apr 2022 06:34)  metOLazone 5 mg oral tablet: 1 tab(s) orally 3 times a week (03 Apr 2022 06:34)  metoprolol succinate 50 mg oral tablet, extended release: 2 tab(s) orally once a day (03 Apr 2022 06:34)  NovoLOG 100 units/mL subcutaneous solution: subcutaneous 4 times a day (before meals and at bedtime) (03 Apr 2022 06:34)  NovoLOG FlexPen 100 units/mL injectable solution: 10 unit(s) subcutaneous 3 times a day (03 Apr 2022 06:34)  oxycodone-acetaminophen 5 mg-325 mg oral tablet: 1 tab(s) orally 2 times a day (03 Apr 2022 06:34)  Pradaxa 150 mg oral capsule: 1 cap(s) orally 2 times a day (03 Apr 2022 06:34)  pregabalin 100 mg oral capsule: 1 cap(s) orally 3 times a day (03 Apr 2022 06:34)      MEDICATIONS  (STANDING):  allopurinol 100 milliGRAM(s) Oral daily  aspirin enteric coated 81 milliGRAM(s) Oral daily  atorvastatin 40 milliGRAM(s) Oral at bedtime  chlorhexidine 2% Cloths 1 Application(s) Topical daily  chlorhexidine 4% Liquid 1 Application(s) Topical <User Schedule>  dextrose 5%. 1000 milliLiter(s) (100 mL/Hr) IV Continuous <Continuous>  dextrose 5%. 1000 milliLiter(s) (50 mL/Hr) IV Continuous <Continuous>  dextrose 50% Injectable 25 Gram(s) IV Push once  dextrose 50% Injectable 12.5 Gram(s) IV Push once  dextrose 50% Injectable 25 Gram(s) IV Push once  epoetin naz-epbx (RETACRIT) Injectable 31835 Unit(s) IV Push once  glucagon  Injectable 1 milliGRAM(s) IntraMuscular once  heparin  Infusion.  Unit(s)/Hr (24 mL/Hr) IV Continuous <Continuous>  hydrALAZINE 25 milliGRAM(s) Oral three times a day  insulin lispro (ADMELOG) corrective regimen sliding scale   SubCutaneous three times a day before meals  insulin lispro (ADMELOG) corrective regimen sliding scale   SubCutaneous at bedtime  metoprolol succinate  milliGRAM(s) Oral daily  pregabalin 25 milliGRAM(s) Oral two times a day  senna 2 Tablet(s) Oral at bedtime  sevelamer carbonate 1600 milliGRAM(s) Oral three times a day with meals      TELEMETRY: afib hr 80-100s 	    ECG:  	  RADIOLOGY:   DIAGNOSTIC TESTING:  [ ] Echocardiogram:  [ ]  Catheterization:  [ ] Stress Test:    OTHER: 	    LABS:	 	                            8.9    11.34 )-----------( 190      ( 12 Apr 2022 19:22 )             29.0     04-12    130<L>  |  93<L>  |  71<H>  ----------------------------<  168<H>  4.5   |  22  |  6.56<H>    Ca    8.8      12 Apr 2022 19:22      PT/INR - ( 12 Apr 2022 07:24 )   PT: 14.6 sec;   INR: 1.27 ratio         PTT - ( 12 Apr 2022 19:22 )  PTT:32.3 sec

## 2022-04-13 NOTE — PROGRESS NOTE ADULT - ASSESSMENT
·  Problem: Acute kidney injury superimposed on CKD. pt currently on hd  to cont as per renal  perm cath done  pulm / cards f.u    Lethargy resolved  - CT head no acute event       Problem/Plan - 2:  ·  Problem: Chronic atrial fibrillation. c/w a/c  cards f/u     Problem/Plan - 3:  ·  Problem: Cystitis.   treatment as per id  s/p abs     Problem/Plan - 4:  ·  Problem: Type 2 diabetes mellitus. c/w insulin   endo f/u     Problem/Plan - 5:  ·  Problem: CAD (coronary artery disease).   stable  cards f/u       s/p shiley removal / groin w/ bleeding  controlled  vasc f/u

## 2022-04-13 NOTE — PROGRESS NOTE ADULT - PROBLEM SELECTOR PLAN 5
-ABG with no CO2 retention, sedating medications held  -? if 2nd to uremia, HD per renal.  -Menta status improving

## 2022-04-13 NOTE — PROGRESS NOTE ADULT - SUBJECTIVE AND OBJECTIVE BOX
DATE OF SERVICE: 04-13-22 @ 12:40  CHIEF COMPLAINT:Patient is a 67y old  Male who presents with a chief complaint of Decreased urine output for the past week, leg oedema (13 Apr 2022 12:06)    	        PAST MEDICAL & SURGICAL HISTORY:  HTN (hypertension), benign    HLD (hyperlipidemia)    DM type 2 (diabetes mellitus, type 2)    TIA (transient ischemic attack)    Atrial fibrillation    MI (myocardial infarction)  circa 2014 and 2022.    CAD (coronary artery disease)    Neuropathy    Stage 3 chronic kidney disease    2019 novel coronavirus disease (COVID-19)  12/2021.  Received the COVID-19 vaccine x 2 as of April 2022.    Status post angioplasty with stent  LULU x 3 2/7/2014    S/P carotid endarterectomy  left          events noted      NECK: No pain or stiffness  RESPIRATORY: No cough, wheezing, chills or hemoptysis; No Shortness of Breath  CARDIOVASCULAR: No chest pain, palpitations, passing out,   GASTROINTESTINAL: No abdominal or epigastric pain. No nausea, vomiting, or hematemesis;   constipated    NEUROLOGICAL: No headaches,     Medications:  MEDICATIONS  (STANDING):  allopurinol 100 milliGRAM(s) Oral daily  aspirin enteric coated 81 milliGRAM(s) Oral daily  atorvastatin 40 milliGRAM(s) Oral at bedtime  chlorhexidine 2% Cloths 1 Application(s) Topical daily  chlorhexidine 4% Liquid 1 Application(s) Topical <User Schedule>  dextrose 5%. 1000 milliLiter(s) (100 mL/Hr) IV Continuous <Continuous>  dextrose 5%. 1000 milliLiter(s) (50 mL/Hr) IV Continuous <Continuous>  dextrose 50% Injectable 25 Gram(s) IV Push once  dextrose 50% Injectable 12.5 Gram(s) IV Push once  dextrose 50% Injectable 25 Gram(s) IV Push once  diltiazem    milliGRAM(s) Oral daily  epoetin naz-epbx (RETACRIT) Injectable 83294 Unit(s) IV Push once  glucagon  Injectable 1 milliGRAM(s) IntraMuscular once  heparin  Infusion.  Unit(s)/Hr (24 mL/Hr) IV Continuous <Continuous>  hydrALAZINE 25 milliGRAM(s) Oral three times a day  insulin lispro (ADMELOG) corrective regimen sliding scale   SubCutaneous three times a day before meals  insulin lispro (ADMELOG) corrective regimen sliding scale   SubCutaneous at bedtime  metoprolol succinate  milliGRAM(s) Oral daily  pregabalin 25 milliGRAM(s) Oral two times a day  senna 2 Tablet(s) Oral at bedtime  sevelamer carbonate 1600 milliGRAM(s) Oral three times a day with meals    MEDICATIONS  (PRN):  bisacodyl 5 milliGRAM(s) Oral every 12 hours PRN Constipation  dextrose Oral Gel 15 Gram(s) Oral once PRN Blood Glucose LESS THAN 70 milliGRAM(s)/deciliter  heparin   Injectable 86308 Unit(s) IV Push every 6 hours PRN For aPTT less than 40  heparin   Injectable 5000 Unit(s) IV Push every 6 hours PRN For aPTT between 40 - 57  sodium chloride 0.9% lock flush 10 milliLiter(s) IV Push every 1 hour PRN Pre/post blood products, medications, blood draw, and to maintain line patency    	    PHYSICAL EXAM:  T(C): 36.4 (04-13-22 @ 11:57), Max: 37.1 (04-13-22 @ 04:00)  HR: 89 (04-13-22 @ 11:57) (61 - 89)  BP: 145/63 (04-13-22 @ 11:57) (130/69 - 151/72)  RR: 20 (04-13-22 @ 11:57) (19 - 20)  SpO2: 92% (04-13-22 @ 11:57) (92% - 98%)  Wt(kg): --  I&O's Summary    12 Apr 2022 07:01  -  13 Apr 2022 07:00  --------------------------------------------------------  IN: 240 mL / OUT: 0 mL / NET: 240 mL        l	  HEENT:   Normal oral mucosa, PERRL, EOMI	    Cardiovascular: Normal S1 S2, No JVD,  Respiratory: Lungs clear to auscultation	  Psychiatry: A & O x 3,  Gastrointestinal:  Soft, Non-tender, + BS	  	  Neurologic: Non-focal  Extremities:dec rom  pvd  dec edema    TELEMETRY: 	    ECG:  	  RADIOLOGY:  OTHER: 	  	  LABS:	 	    CARDIAC MARKERS:                                8.7    12.24 )-----------( 202 ( 13 Apr 2022 11:27 )             28.2     04-12    130<L>  |  93<L>  |  71<H>  ----------------------------<  168<H>  4.5   |  22  |  6.56<H>    Ca    8.8      12 Apr 2022 19:22      proBNP:   Lipid Profile:   HgA1c:   TSH:

## 2022-04-13 NOTE — PROGRESS NOTE ADULT - ASSESSMENT
Impression:  67 year old gentleman pmhx CAD s/p MI/PCI/CABG, hx TIA, afib on pradaxa, HTN, DM2, PVD, gout, L CEA 2014, CKD admitted to ED with ANT, tremors, confusion and lethargy. Receiving new hemodialysis. CT head no acute changes. Mental status improved today. Pt denies any headaches, no slurred speech, no focal facial or limb weakness. Has chronic gout with bilateral finger swelling, pain.     Diagnosis:  Presentation consistent with toxic metabolic encephalopathy secondary to ANT/CKD improving with dialysis, CT head no acute changes, no focal findings on exam, no evidence of acute neurological event at this time.     Recommendations:  Continue HD as per renal  Continue supportive care per primary team  OOB  Physical therapy  Continue stroke prophylaxis- on AC for afib, antiplatelet, statin, optimize hypertension/glycemic control  No further inpatient neurological tests at this time.   d/w patient and son at bedside

## 2022-04-13 NOTE — PROGRESS NOTE ADULT - SUBJECTIVE AND OBJECTIVE BOX
[facility]   [phone]  RENAL FOLLOW UP NOTE  --------------------------------------------------------------------------------  Chief Complaint:    24 hour events/subjective:    seen earlier   states has neuropathy pain no worsening shortness of breath     PAST HISTORY  --------------------------------------------------------------------------------  No significant changes to PMH, PSH, FHx, SHx, unless otherwise noted    ALLERGIES & MEDICATIONS  --------------------------------------------------------------------------------  Allergies    nitrofurantoin (Nephrotoxicity)    Intolerances      Standing Inpatient Medications  allopurinol 100 milliGRAM(s) Oral daily  aspirin enteric coated 81 milliGRAM(s) Oral daily  atorvastatin 40 milliGRAM(s) Oral at bedtime  chlorhexidine 2% Cloths 1 Application(s) Topical daily  chlorhexidine 4% Liquid 1 Application(s) Topical <User Schedule>  dextrose 5%. 1000 milliLiter(s) IV Continuous <Continuous>  dextrose 5%. 1000 milliLiter(s) IV Continuous <Continuous>  dextrose 50% Injectable 25 Gram(s) IV Push once  dextrose 50% Injectable 12.5 Gram(s) IV Push once  dextrose 50% Injectable 25 Gram(s) IV Push once  diltiazem    milliGRAM(s) Oral daily  glucagon  Injectable 1 milliGRAM(s) IntraMuscular once  heparin  Infusion.  Unit(s)/Hr IV Continuous <Continuous>  hydrALAZINE 25 milliGRAM(s) Oral three times a day  insulin lispro (ADMELOG) corrective regimen sliding scale   SubCutaneous three times a day before meals  insulin lispro (ADMELOG) corrective regimen sliding scale   SubCutaneous at bedtime  metoprolol succinate  milliGRAM(s) Oral daily  pregabalin 25 milliGRAM(s) Oral two times a day  senna 2 Tablet(s) Oral at bedtime  sevelamer carbonate 1600 milliGRAM(s) Oral three times a day with meals    PRN Inpatient Medications  bisacodyl 5 milliGRAM(s) Oral every 12 hours PRN  dextrose Oral Gel 15 Gram(s) Oral once PRN  heparin   Injectable 66050 Unit(s) IV Push every 6 hours PRN  heparin   Injectable 5000 Unit(s) IV Push every 6 hours PRN  sodium chloride 0.9% lock flush 10 milliLiter(s) IV Push every 1 hour PRN      REVIEW OF SYSTEMS  --------------------------------------------------------------------------------    Gen: denies chills,  CVS: denies chest pain/palpitations  Resp: denies SOB/Cough  GI: Denies N/V/Abd pain  : Denies dysuria      VITALS/PHYSICAL EXAM  --------------------------------------------------------------------------------  T(C): 36.5 (04-13-22 @ 16:57), Max: 37.1 (04-13-22 @ 04:00)  HR: 93 (04-13-22 @ 16:57) (61 - 97)  BP: 138/46 (04-13-22 @ 16:57) (138/46 - 161/72)  RR: 19 (04-13-22 @ 16:57) (19 - 20)  SpO2: 100% (04-13-22 @ 16:57) (92% - 100%)  Wt(kg): --        04-12-22 @ 07:01  -  04-13-22 @ 07:00  --------------------------------------------------------  IN: 240 mL / OUT: 0 mL / NET: 240 mL    04-13-22 @ 07:01  -  04-13-22 @ 19:49  --------------------------------------------------------  IN: 70 mL / OUT: 2500 mL / NET: -2430 mL      Physical Exam:  	  	Gen: Appears comfortable, on O2  	Pulm: Decreased breath sounds b/l bases. no rales ronchi or wheezing  	CV: No JVD. IRR,    	Abd: +BS, soft, nontender, softly distended, obese   	: No suprapubic tenderness.               Extremity UE: increased warmth, swollen fingers B/L               Extremity LE: + hyperpigmented bilateral LE with B/L non-pitting edema- improved, B/L LE tender on palpation              Vascular: R groin HD cath, R IJ HD catheter      LABS/STUDIES  --------------------------------------------------------------------------------              8.7    12.24 >-----------<  202      [04-13-22 @ 11:27]              28.2     130  |  93  |  71  ----------------------------<  168      [04-12-22 @ 19:22]  4.5   |  22  |  6.56        Ca     8.8     [04-12-22 @ 19:22]      PT/INR: PT 14.6 , INR 1.27       [04-12-22 @ 07:24]  PTT: 32.3       [04-12-22 @ 19:22]      Creatinine Trend:  SCr 6.56 [04-12 @ 19:22]  SCr 5.76 [04-12 @ 07:22]  SCr 6.42 [04-11 @ 07:07]  SCr 4.71 [04-10 @ 07:29]  SCr 6.87 [04-09 @ 16:11]              Urinalysis - [04-03-22 @ 00:48]      Color Light Orange / Appearance Turbid / SG 1.021 / pH 5.5      Gluc Negative / Ketone Negative  / Bili Negative / Urobili Negative       Blood Large / Protein 300 mg/dL / Leuk Est Large / Nitrite Negative      RBC 20 /  / Hyaline 10 / Gran  / Sq Epi  / Non Sq Epi 3 / Bacteria Negative      Iron 31, TIBC 243, %sat 13      [04-04-22 @ 00:17]  Ferritin 152      [04-03-22 @ 23:06]  PTH -- (Ca --)      [04-03-22 @ 23:06]   97  HbA1c 11.7      [11-07-19 @ 09:14]  TSH 1.98      [04-05-22 @ 05:19]    C3 Complement 61      [04-11-22 @ 16:56]  C4 Complement 28      [04-11-22 @ 16:56]  anti-GBM 3      [04-11-22 @ 19:27]  Free Light Chains: kappa 16.92, lambda 12.63, ratio = 1.34      [04-11 @ 16:56]

## 2022-04-13 NOTE — PROGRESS NOTE ADULT - ASSESSMENT
A/P    66 y/o M with history of CAD, s/p multiple PCI, with most recent 2/22 PCI of LAD in setting of NSTEMI, on ASA only (pradaxa), chronic atrial fibrillation maintained on Pradaxa, essential HTN, type 2 DM previously on oral medications but on insulin, diabetic neuropathy with chronic RIGHT LE venous stasis changes>left, LEFT foot ulcer seen by podiatry, past endarterectomy LEFT CEA in 2014 presenting with 1 week of decreased urine output, cystitis with hematuria     #ANT on CKD, new HD  -oliguric renal failure in setting of ? UTI/cystitis, r/o obstruction ? secondary to abx   -worsened with diuretic use but unlikely due to hypovolemia alone from diuretic use   -hyperkalemia improved   -New HD for uremia, renal failure-  s/p non tunneled HD catheter on 4/12   -renal f/u -sp rrt 4/12 for uncontrolled bleeding sp  right groin shiley removal  - monitor h/h     #AMS/Lethargy  -sig improved post hd   -likely 2/2 Uremic encephalopathy   -ABG noted  -med f/u   -MICU eval noted 4/5  -head ct ok    #Acute on chronic HFpEF  -remains overloaded but improved  -continue aggressive volume removal with HD  -now tolerating po - c/w  bb    #Chronic AF  -rates improved sp cardizem gtt    -c/w metoprolol succ 100 mg qd  -resume dilt cd 120mg daily    -resume ac per IR      #HTN  -tolerating PO--- c/w po metoprolol/ restart cardizem PO    -c/w hydral po today     #CAD, s/p PCI, most recent 2/2022  -stable, cont ASA, off plavix due to a/c indication  -statin     #R carotid stenosis  -cont med tx  -MRA noted with Greater than 75% stenosis of the right distal common carotid artery. Approximately 60% stenosis of the proximal left internal carotid artery.  -Continue ASA and Statin Therapy  -vasc outpt f/u Dr Pinto    ACP- Advanced Care Planning  -Advanced care planning discussed with patient. Advanced care planning forms discussed with patient and/or family.  Risks, benefits, and alternatives of medical/cardiac procedures were discussed in detail with all questions answered.  30 minutes were spent addressing advance care planning.

## 2022-04-14 NOTE — PROGRESS NOTE ADULT - PROBLEM SELECTOR PLAN 5
-ABG with no CO2 retention, sedating medications held  -? if 2nd to uremia, HD per renal.  -Menta status improved

## 2022-04-14 NOTE — PROGRESS NOTE ADULT - ASSESSMENT
·  Problem: Acute kidney injury superimposed on CKD. pt currently on hd  to cont as per renal  perm cath done  pulm / cards f.u    Lethargy resolved  - CT head no acute event       Problem/Plan - 2:  ·  Problem: Chronic atrial fibrillation. c/w a/c  cards f/u     Problem/Plan - 3:  ·  Problem: Cystitis.   treatment as per id  s/p abs     Problem/Plan - 4:  ·  Problem: Type 2 diabetes mellitus. c/w insulin   endo f/u     Problem/Plan - 5:  ·  Problem: CAD (coronary artery disease).   stable  cards f/u       s/p shiley removal / groin w/ bleeding resolved    vasc f/u

## 2022-04-14 NOTE — PROGRESS NOTE ADULT - SUBJECTIVE AND OBJECTIVE BOX
Admitting Diagnosis:  Chronic kidney disease [N18.9]  CHRONIC KIDNEY DISEASE, UNSPECIFIED    Background:  This is a 67 year old gentleman pmhx CAD s/p MI/PCI/CABG, hx TIA, afib on rivaroxaban, HTN, DM2, PVD, gout, L CEA 2014, and CKD who presented to [facility] 4/11/22 with ANT, tremors, confusion and lethargy.  CT head no acute changes.  S/p initiation of hemodialysis.  Mental status has improved dramatically.  Pt denies any headaches, no slurred speech, no hemiparesis.  He has chronic gout with bilateral finger swelling, pain.  He also has chronic neuropathy.  Both legs are weak.      Interval Hx:  mental status continues to improve.  Legs still quite weak but improved from yesterday.  He is still very deconditioned and has neuropathic pain in his legs.  Restarted pregabalin    ******    Past Medical History:  HTN (hypertension), benign [401.1]  HLD (hyperlipidemia) [272.4]  DM type 2 (diabetes mellitus, type 2) [250.00]  TIA (transient ischemic attack) [435.9]  Atrial fibrillation [427.31]  MI (myocardial infarction) [410.90]  circa 2014 and 2022.  CAD (coronary artery disease) [414.00]  Neuropathy [G62.9]  Stage 3 chronic kidney disease [N18.30]  2019 novel coronavirus disease (COVID-19) [U07.1]  12/2021.  Received the COVID-19 vaccine x 2 as of April 2022.    Past Surgical History:  Status post angioplasty with stent [V45.82]  LULU x 3 2/7/2014  S/P carotid endarterectomy [Z98.89]  left  S/P CABG (coronary artery bypass graft) [Z95.1]        Social History:  No toxic habits    Family History:  FAMILY HISTORY:  Family history of heart disease  father    Family history of CVA  mother    Allergies:  nitrofurantoin (Nephrotoxicity)    ROS: as per HPI.  Advanced care planning reviewed and noted in the chart.    Medications:  allopurinol 100 milliGRAM(s) Oral daily  aspirin enteric coated 81 milliGRAM(s) Oral daily  atorvastatin 40 milliGRAM(s) Oral at bedtime  bisacodyl 5 milliGRAM(s) Oral every 12 hours PRN  chlorhexidine 2% Cloths 1 Application(s) Topical daily  chlorhexidine 4% Liquid 1 Application(s) Topical <User Schedule>  dextrose 5%. 1000 milliLiter(s) IV Continuous <Continuous>  dextrose 5%. 1000 milliLiter(s) IV Continuous <Continuous>  dextrose 50% Injectable 25 Gram(s) IV Push once  dextrose 50% Injectable 12.5 Gram(s) IV Push once  dextrose 50% Injectable 25 Gram(s) IV Push once  dextrose Oral Gel 15 Gram(s) Oral once PRN  diltiazem    milliGRAM(s) Oral daily  glucagon  Injectable 1 milliGRAM(s) IntraMuscular once  heparin   Injectable 41475 Unit(s) IV Push every 6 hours PRN  heparin   Injectable 5000 Unit(s) IV Push every 6 hours PRN  heparin  Infusion.  Unit(s)/Hr IV Continuous <Continuous>  hydrALAZINE 25 milliGRAM(s) Oral three times a day  insulin lispro (ADMELOG) corrective regimen sliding scale   SubCutaneous three times a day before meals  insulin lispro (ADMELOG) corrective regimen sliding scale   SubCutaneous at bedtime  metoprolol succinate  milliGRAM(s) Oral daily  pregabalin 25 milliGRAM(s) Oral two times a day  senna 2 Tablet(s) Oral at bedtime  sevelamer carbonate 1600 milliGRAM(s) Oral three times a day with meals  sodium chloride 0.9% lock flush 10 milliLiter(s) IV Push every 1 hour PRN      Labs:  CBC Full  -  ( 14 Apr 2022 05:39 )  WBC Count : 13.04 K/uL  RBC Count : 3.48 M/uL  Hemoglobin : 8.7 g/dL  Hematocrit : 28.8 %  Platelet Count - Automated : 189 K/uL  Mean Cell Volume : 82.8 fl  Mean Cell Hemoglobin : 25.0 pg  Mean Cell Hemoglobin Concentration : 30.2 gm/dL  Auto Neutrophil # : x  Auto Lymphocyte # : x  Auto Monocyte # : x  Auto Eosinophil # : x  Auto Basophil # : x  Auto Neutrophil % : x  Auto Lymphocyte % : x  Auto Monocyte % : x  Auto Eosinophil % : x  Auto Basophil % : x    04-12    130<L>  |  93<L>  |  71<H>  ----------------------------<  168<H>  4.5   |  22  |  6.56<H>    Ca    8.8      12 Apr 2022 19:22      CAPILLARY BLOOD GLUCOSE      POCT Blood Glucose.: 137 mg/dL (14 Apr 2022 08:10)  POCT Blood Glucose.: 139 mg/dL (13 Apr 2022 22:07)  POCT Blood Glucose.: 129 mg/dL (13 Apr 2022 17:21)  POCT Blood Glucose.: 156 mg/dL (13 Apr 2022 14:02)  POCT Blood Glucose.: 180 mg/dL (13 Apr 2022 12:22)      PTT - ( 14 Apr 2022 05:39 )  PTT:75.6 sec        Vitals:  Vital Signs Last 24 Hrs  T(C): 36.9 (14 Apr 2022 09:45), Max: 36.9 (14 Apr 2022 09:45)  T(F): 98.5 (14 Apr 2022 09:45), Max: 98.5 (14 Apr 2022 09:45)  HR: 93 (14 Apr 2022 09:45) (81 - 97)  BP: 171/80 (14 Apr 2022 09:45) (117/70 - 171/80)  BP(mean): --  RR: 18 (14 Apr 2022 09:45) (18 - 20)  SpO2: 93% (14 Apr 2022 09:45) (92% - 100%)    NEUROLOGICAL EXAM:    Mental status: Awake, alert, and in no apparent distress. Oriented to person, place, and time.  Language intact.  Attention grossly intact.  No neglect    Cranial Nerves: Pupils were equal, round, reactive to light. Extraocular movements were intact. B BTT. Fundoscopic exam was deferred. Facial sensation was intact to light touch. There was no facial asymmetry. The palate was upgoing symmetrically and tongue was midline. Hearing acuity was intact to finger rub AU. Shoulder shrug was full bilaterally    Motor exam: Bulk and tone were normal. No downward drift in arms.  both legs minimally antigravity though in part limited by edema, PVD, and neuropathy.  Fine finger movements were symmetric. There was bilateral symmetric pronator drift    Reflexes: DTRs depressed, flexor plantars.     Sensation: Intact to light touch, temperature.    Coordination: Finger-nose-finger intact.     Gait: deferred    Imaging:    ACC: [id number] EXAM:  CT BRAIN                        PROCEDURE DATE:  04/09/2022      INTERPRETATION:  CLINICAL INFORMATION:  Altered mental status, on   anticoagulation .    TECHNIQUE: Multiple contiguous axial images were acquired from the   skullbase to the vertex without the administration of intravenous   contrast.  Coronal and sagittal reformations were made.    COMPARISON: CT head 10/21/2021, brain MRI 02/23/2014.    FINDINGS:    Scattered lacunar infarcts in the bilateral cerebralhemispheres are   grossly stable compared to the 10/21/2021 head CT. No new additional   infarcts are noted over the time interval.    No acute intracranial hemorrhage, mass effect, or midline shift. The   brain demonstrates periventricular hypoattenuation, nonspecific in   etiology but likely representing chronic microvascular ischemic changes.    No abnormal extra-axial fluid collections are present.    The ventricles and sulci demonstrate age appropriate involutional   changes.  The basal cisterns are patent.    The orbits are unremarkable. The paranasal sinuses are clear. The mastoid   air cells are clear. The calvarium is intact.    IMPRESSION:    No acute intracranial abnormality is noted. If the patient has new and   persistent symptoms, consider short interval follow-up head CT or brain   MRI.    --- End of Report ---    GATITO VILLARREAL MD; Resident Radiologist  This document has been electronically signed.  JESSE CORONA MD; Attending Radiologist  This document has been electronically signed. Apr 9 2022  2:00PM

## 2022-04-14 NOTE — PROGRESS NOTE ADULT - SUBJECTIVE AND OBJECTIVE BOX
Follow-up Pulm Progress Note    No new respiratory events overnight.  Denies SOB/CP.   Sats 92% RA     Medications:  MEDICATIONS  (STANDING):  allopurinol 100 milliGRAM(s) Oral daily  aspirin enteric coated 81 milliGRAM(s) Oral daily  atorvastatin 40 milliGRAM(s) Oral at bedtime  chlorhexidine 2% Cloths 1 Application(s) Topical daily  chlorhexidine 4% Liquid 1 Application(s) Topical <User Schedule>  dextrose 5%. 1000 milliLiter(s) (50 mL/Hr) IV Continuous <Continuous>  dextrose 5%. 1000 milliLiter(s) (100 mL/Hr) IV Continuous <Continuous>  dextrose 50% Injectable 25 Gram(s) IV Push once  dextrose 50% Injectable 12.5 Gram(s) IV Push once  dextrose 50% Injectable 25 Gram(s) IV Push once  diltiazem    milliGRAM(s) Oral daily  glucagon  Injectable 1 milliGRAM(s) IntraMuscular once  heparin  Infusion.  Unit(s)/Hr (24 mL/Hr) IV Continuous <Continuous>  hydrALAZINE 25 milliGRAM(s) Oral three times a day  insulin lispro (ADMELOG) corrective regimen sliding scale   SubCutaneous three times a day before meals  insulin lispro (ADMELOG) corrective regimen sliding scale   SubCutaneous at bedtime  metoprolol succinate  milliGRAM(s) Oral daily  pregabalin 25 milliGRAM(s) Oral two times a day  senna 2 Tablet(s) Oral at bedtime  sevelamer carbonate 1600 milliGRAM(s) Oral three times a day with meals    MEDICATIONS  (PRN):  bisacodyl 5 milliGRAM(s) Oral every 12 hours PRN Constipation  dextrose Oral Gel 15 Gram(s) Oral once PRN Blood Glucose LESS THAN 70 milliGRAM(s)/deciliter  heparin   Injectable 24554 Unit(s) IV Push every 6 hours PRN For aPTT less than 40  heparin   Injectable 5000 Unit(s) IV Push every 6 hours PRN For aPTT between 40 - 57  sodium chloride 0.9% lock flush 10 milliLiter(s) IV Push every 1 hour PRN Pre/post blood products, medications, blood draw, and to maintain line patency          Vital Signs Last 24 Hrs  T(C): 36.9 (14 Apr 2022 09:45), Max: 36.9 (14 Apr 2022 09:45)  T(F): 98.5 (14 Apr 2022 09:45), Max: 98.5 (14 Apr 2022 09:45)  HR: 93 (14 Apr 2022 09:45) (81 - 97)  BP: 171/80 (14 Apr 2022 09:45) (117/70 - 171/80)  BP(mean): --  RR: 18 (14 Apr 2022 09:45) (18 - 20)  SpO2: 93% (14 Apr 2022 09:45) (93% - 100%)          04-13 @ 07:01  -  04-14 @ 07:00  --------------------------------------------------------  IN: 480 mL / OUT: 2500 mL / NET: -2020 mL          LABS:                        8.7    13.04 )-----------( 189      ( 14 Apr 2022 05:39 )             28.8     04-12    130<L>  |  93<L>  |  71<H>  ----------------------------<  168<H>  4.5   |  22  |  6.56<H>    Ca    8.8      12 Apr 2022 19:22            CAPILLARY BLOOD GLUCOSE      POCT Blood Glucose.: 137 mg/dL (14 Apr 2022 08:10)    PTT - ( 14 Apr 2022 05:39 )  PTT:75.6 sec        DEREJE -- 04-11 @ 16:24  Anti SS-1 --  Anti SS-2 --  Anti RNP --  RF -- 04-11 @ 16:24    Atypical ANCA Indeterminate Method interference due to DEREJE fluorescence 04-11 @ 16:24  c-ANCA titer -- 04-11 @ 16:24  c-ANCA Negative 04-11 @ 16:24  p-ANCA Negative 04-11 @ 16:24              CULTURES:     Culture - Blood (collected 04-04-22 @ 17:46)  Source: .Blood Blood-Peripheral  Final Report (04-09-22 @ 18:00):    No Growth Final    Culture - Blood (collected 04-04-22 @ 17:46)  Source: .Blood Blood-Peripheral  Final Report (04-09-22 @ 18:00):    No Growth Final            Physical Examination:  PULM: Decreased at bases  CVS: S1, S2 heard    RADIOLOGY REVIEWED  CXR 4/11: unchanged b/l effusions, atelectasis    CT chest: < from: CT Chest No Cont (04.04.22 @ 16:52) >  FINDINGS:    AIRWAYS, LUNGS, PLEURA: Tracheal secretions. Small left greater than   right pleural effusions increased compared to 2/19/2022. Right-sided   pleural thickening. Lingular and left greater than right basilar   opacification, likely atelectasis.    MEDIASTINUM: Cardiomegaly. No pericardial effusion. Thoracic aorta normal   caliber.  No large mediastinal lymph nodes.    IMAGED ABDOMEN: Nonspecific perinephric stranding.    SOFT TISSUES: Unremarkable.    BONES: Unremarkable.      IMPRESSION:.    Small left greater than right bilateral pleural effusions increased in   size compared to 2/19/2022.    Lingular and left greater than right basilar opacification, likely   atelectasis.    --- End of Report ---    < end of copied text >

## 2022-04-14 NOTE — PROGRESS NOTE ADULT - SUBJECTIVE AND OBJECTIVE BOX
[facility], Division of Infectious Diseases  ADRIANA Seals Y. Patel, S. Shah, G. [facility]  [phone]    Name: DYLAN DEL CASTILLO  Age: 67y  Gender: Male  MRN: [id number]    Interval History:  Patient seen and examined at bedside this morning  No acute overnight events.   Notes reviewed    Antibiotics:      Medications:  allopurinol 100 milliGRAM(s) Oral daily  aspirin enteric coated 81 milliGRAM(s) Oral daily  atorvastatin 40 milliGRAM(s) Oral at bedtime  bisacodyl 5 milliGRAM(s) Oral every 12 hours PRN  chlorhexidine 2% Cloths 1 Application(s) Topical daily  chlorhexidine 4% Liquid 1 Application(s) Topical <User Schedule>  dextrose 5%. 1000 milliLiter(s) IV Continuous <Continuous>  dextrose 5%. 1000 milliLiter(s) IV Continuous <Continuous>  dextrose 50% Injectable 25 Gram(s) IV Push once  dextrose 50% Injectable 12.5 Gram(s) IV Push once  dextrose 50% Injectable 25 Gram(s) IV Push once  dextrose Oral Gel 15 Gram(s) Oral once PRN  diltiazem    milliGRAM(s) Oral daily  glucagon  Injectable 1 milliGRAM(s) IntraMuscular once  heparin   Injectable 04303 Unit(s) IV Push every 6 hours PRN  heparin   Injectable 5000 Unit(s) IV Push every 6 hours PRN  heparin  Infusion.  Unit(s)/Hr IV Continuous <Continuous>  hydrALAZINE 25 milliGRAM(s) Oral three times a day  insulin lispro (ADMELOG) corrective regimen sliding scale   SubCutaneous three times a day before meals  insulin lispro (ADMELOG) corrective regimen sliding scale   SubCutaneous at bedtime  metoprolol succinate  milliGRAM(s) Oral daily  pregabalin 25 milliGRAM(s) Oral two times a day  senna 2 Tablet(s) Oral at bedtime  sevelamer carbonate 1600 milliGRAM(s) Oral three times a day with meals  sodium chloride 0.9% lock flush 10 milliLiter(s) IV Push every 1 hour PRN      Review of Systems:  Review of systems otherwise negative except as previously noted.    Allergies: nitrofurantoin (Nephrotoxicity)    For details regarding the patient's past medical history, social history, family history, and other miscellaneous elements, please refer the initial infectious diseases consultation and/or the admitting history and physical examination for this admission.    Objective:  Vitals:   T(C): 36.7 (04-14-22 @ 04:05), Max: 36.8 (04-13-22 @ 20:03)  HR: 81 (04-14-22 @ 04:05) (81 - 97)  BP: 154/70 (04-14-22 @ 04:05) (117/70 - 161/72)  RR: 18 (04-14-22 @ 04:05) (18 - 20)  SpO2: 94% (04-14-22 @ 04:05) (92% - 100%)    Physical Examination:  General: no acute distress  HEENT: NC/AT, EOM  Cardio: S1, S2 heard, RRR, no murmurs  Resp: breath sounds heard bilaterally, no rales, wheezes or rhonchi  Abd: soft, NT, ND,  Ext: no edema or cyanosis  Skin: warm, dry, no visible rash  Lines R permacath    Laboratory Studies:  CBC:                       8.7    13.04 )-----------( 189      ( 14 Apr 2022 05:39 )             28.8     CMP: 04-12    130<L>  |  93<L>  |  71<H>  ----------------------------<  168<H>  4.5   |  22  |  6.56<H>    Ca    8.8      12 Apr 2022 19:22            Microbiology: reviewed    Culture - Blood (collected 04-04-22 @ 17:46)  Source: .Blood Blood-Peripheral  Final Report (04-09-22 @ 18:00):    No Growth Final    Culture - Blood (collected 04-04-22 @ 17:46)  Source: .Blood Blood-Peripheral  Final Report (04-09-22 @ 18:00):    No Growth Final    Culture - Urine (collected 04-03-22 @ 04:11)  Source: Clean Catch Clean Catch (Midstream)  Final Report (04-04-22 @ 12:00):    <10,000 CFU/mL Normal Urogenital Tiffany        Radiology: reviewed

## 2022-04-14 NOTE — PROGRESS NOTE ADULT - ASSESSMENT
Patient is a 67 year old male with PMH of CAD with past multiple PCI, with most recent discharge from [facility] in Feb 2022 for NSTEMI with LULU of an 85% prox LAD lesion with patient on ASA and now off Plavix per cardiology (only for one month), chronic afib on Pradaxa, HTN, type 2 DM on insulin, diabetic neuropathy with chronic RIGHT LE venous stasis changes>left, LEFT foot ulcer seen by podiatry, past endarterectomy LEFT CEA in 2014 who presented with UTI with hematuria diagnosed at urgent care and now with decreased urine output.     Acute cystitis/UTI   Leukocytosis   - noted dark urine with hematuria, went to UC and was prescribed macrobid, had some improvement in urine color but then noticed decreased urine output with no voiding reported in last 2 days   - UA here with pyuria -  with large LE, negative nitrites and no bacteria    - CTAP reviewed - no hydronephrosis, nephrolithiasis, or evidence of obstructive uropathy   - urine culture negative   - blood cultures negative   - s/p ceftriaxone x7 day course completed 4/9   - monitor off Abx   - monitor temps/WBC    ANT on CKD3   - Nephrology following, appreciate recs   - s/p placement of shiley and initiation of HD   - pending permacath placement   - monitor Cr    - renally dose meds     B/l LE edema with chronic venous stasis  Acute on chronic HFpEF   - legs cool to touch, nontender, chronic changes with no sign of infection/cellulitis   - has wound on bottom of L foot - dry and does not appear infected either   - clinically overloaded, Cardiology and Nephrology following   - elevate lower extremities      DM2 with neuropathy   - Blood glucose management per primary team.    Dr. Daniel Cuevas covering service on tomorrow, 4/15  Dr. Dimitrios Floyd covering service this weekend 4/16-4/17  Infectious Diseases will continue to follow. Please call with any questions.   Annie King M.D.  [facility], Division of Infectious Diseases [phone]

## 2022-04-14 NOTE — PROGRESS NOTE ADULT - SUBJECTIVE AND OBJECTIVE BOX
[facility]   [phone]  RENAL FOLLOW UP NOTE  --------------------------------------------------------------------------------  Chief Complaint:    24 hour events/subjective:    Patient seen and examined with Dr. Cordova  states feeling better.   denies sob    PAST HISTORY  --------------------------------------------------------------------------------  No significant changes to PMH, PSH, FHx, SHx, unless otherwise noted    ALLERGIES & MEDICATIONS  --------------------------------------------------------------------------------  Allergies    nitrofurantoin (Nephrotoxicity)    Intolerances      Standing Inpatient Medications  allopurinol 100 milliGRAM(s) Oral daily  aspirin enteric coated 81 milliGRAM(s) Oral daily  atorvastatin 40 milliGRAM(s) Oral at bedtime  chlorhexidine 2% Cloths 1 Application(s) Topical daily  chlorhexidine 4% Liquid 1 Application(s) Topical <User Schedule>  dextrose 5%. 1000 milliLiter(s) IV Continuous <Continuous>  dextrose 5%. 1000 milliLiter(s) IV Continuous <Continuous>  dextrose 50% Injectable 25 Gram(s) IV Push once  dextrose 50% Injectable 12.5 Gram(s) IV Push once  dextrose 50% Injectable 25 Gram(s) IV Push once  diltiazem    milliGRAM(s) Oral daily  glucagon  Injectable 1 milliGRAM(s) IntraMuscular once  heparin  Infusion.  Unit(s)/Hr IV Continuous <Continuous>  hydrALAZINE 25 milliGRAM(s) Oral three times a day  insulin lispro (ADMELOG) corrective regimen sliding scale   SubCutaneous three times a day before meals  insulin lispro (ADMELOG) corrective regimen sliding scale   SubCutaneous at bedtime  metoprolol succinate  milliGRAM(s) Oral daily  pregabalin 25 milliGRAM(s) Oral two times a day  senna 2 Tablet(s) Oral at bedtime  sevelamer carbonate 1600 milliGRAM(s) Oral three times a day with meals    PRN Inpatient Medications  bisacodyl 5 milliGRAM(s) Oral every 12 hours PRN  dextrose Oral Gel 15 Gram(s) Oral once PRN  heparin   Injectable 53822 Unit(s) IV Push every 6 hours PRN  heparin   Injectable 5000 Unit(s) IV Push every 6 hours PRN  sodium chloride 0.9% lock flush 10 milliLiter(s) IV Push every 1 hour PRN      REVIEW OF SYSTEMS  --------------------------------------------------------------------------------    Gen: denies fevers/chills,  CVS: denies chest pain/palpitations  Resp: denies SOB/Cough  GI: Denies N/V/Abd pain  : Denies dysuria    VITALS/PHYSICAL EXAM  --------------------------------------------------------------------------------  T(C): 36.9 (04-14-22 @ 09:45), Max: 36.9 (04-14-22 @ 09:45)  HR: 93 (04-14-22 @ 09:45) (81 - 97)  BP: 171/80 (04-14-22 @ 09:45) (117/70 - 171/80)  RR: 18 (04-14-22 @ 09:45) (18 - 20)  SpO2: 93% (04-14-22 @ 09:45) (93% - 100%)  Wt(kg): --        04-13-22 @ 07:01  -  04-14-22 @ 07:00  --------------------------------------------------------  IN: 480 mL / OUT: 2500 mL / NET: -2020 mL      Physical Exam:  	  	Gen: Appears comfortable, on RA  	Pulm: Decreased breath sounds b/l bases. no rales ronchi or wheezing  	CV: No JVD. IRR,    	Abd: +BS, soft, nontender, softly distended, obese   	: No suprapubic tenderness.               Extremity UE: increased warmth, swollen fingers B/L               Extremity LE: + hyperpigmented bilateral LE with B/L non-pitting edema- improved, B/L LE tender on palpation              Vascular: R groin HD cath, R IJ HD catheter    LABS/STUDIES  --------------------------------------------------------------------------------              8.7    13.04 >-----------<  189      [04-14-22 @ 05:39]              28.8     130  |  93  |  71  ----------------------------<  168      [04-12-22 @ 19:22]  4.5   |  22  |  6.56        Ca     8.8     [04-12-22 @ 19:22]        PTT: 75.6       [04-14-22 @ 05:39]      Creatinine Trend:  SCr 6.56 [04-12 @ 19:22]  SCr 5.76 [04-12 @ 07:22]  SCr 6.42 [04-11 @ 07:07]  SCr 4.71 [04-10 @ 07:29]  SCr 6.87 [04-09 @ 16:11]              Urinalysis - [04-03-22 @ 00:48]      Color Light Orange / Appearance Turbid / SG 1.021 / pH 5.5      Gluc Negative / Ketone Negative  / Bili Negative / Urobili Negative       Blood Large / Protein 300 mg/dL / Leuk Est Large / Nitrite Negative      RBC 20 /  / Hyaline 10 / Gran  / Sq Epi  / Non Sq Epi 3 / Bacteria Negative      Iron 31, TIBC 243, %sat 13      [04-04-22 @ 00:17]  Ferritin 152      [04-03-22 @ 23:06]  PTH -- (Ca --)      [04-03-22 @ 23:06]   97  HbA1c 11.7      [11-07-19 @ 09:14]  TSH 1.98      [04-05-22 @ 05:19]    C3 Complement 61      [04-11-22 @ 16:56]  C4 Complement 28      [04-11-22 @ 16:56]  ANCA: cANCA Negative, pANCA Negative, atypical ANCA Indeterminate Method interference due to DEREJE fluorescence      [04-11-22 @ 16:24]  anti-GBM 3      [04-11-22 @ 19:27]  Free Light Chains: kappa 16.92, lambda 12.63, ratio = 1.34      [04-11 @ 16:56]  Immunofixation Serum: No Monoclonal Band Identified    Reference Range: None Detected      [04-11-22 @ 16:56]  SPEP Interpretation: Normal Electrophoresis Pattern      [04-11-22 @ 16:56]

## 2022-04-14 NOTE — PROGRESS NOTE ADULT - SUBJECTIVE AND OBJECTIVE BOX
DATE OF SERVICE: 04-14-22 @ 13:34  CHIEF COMPLAINT:Patient is a 67y old  Male who presents with a chief complaint of Decreased urine output for the past week, leg oedema (14 Apr 2022 12:22)    	        PAST MEDICAL & SURGICAL HISTORY:  HTN (hypertension), benign    HLD (hyperlipidemia)    DM type 2 (diabetes mellitus, type 2)    TIA (transient ischemic attack)    Atrial fibrillation    MI (myocardial infarction)  circa 2014 and 2022.    CAD (coronary artery disease)    Neuropathy    Stage 3 chronic kidney disease    2019 novel coronavirus disease (COVID-19)  12/2021.  Received the COVID-19 vaccine x 2 as of April 2022.    Status post angioplasty with stent  LULU x 3 2/7/2014    S/P carotid endarterectomy  left            sleepy  arousable   answers /?  RESPIRATORY: No cough, wheezing, chills or hemoptysis; No Shortness of Breath  CARDIOVASCULAR: No chest pain, palpitations,   GASTROINTESTINAL: No abdominal or epigastric pain. No nausea, vomiting, or hematemesis;   G  NEUROLOGICAL: No headaches,   PAIN LOWER EXTREMITIES    Medications:  MEDICATIONS  (STANDING):  allopurinol 100 milliGRAM(s) Oral daily  aspirin enteric coated 81 milliGRAM(s) Oral daily  atorvastatin 40 milliGRAM(s) Oral at bedtime  chlorhexidine 2% Cloths 1 Application(s) Topical daily  chlorhexidine 4% Liquid 1 Application(s) Topical <User Schedule>  dextrose 5%. 1000 milliLiter(s) (50 mL/Hr) IV Continuous <Continuous>  dextrose 5%. 1000 milliLiter(s) (100 mL/Hr) IV Continuous <Continuous>  dextrose 50% Injectable 25 Gram(s) IV Push once  dextrose 50% Injectable 12.5 Gram(s) IV Push once  dextrose 50% Injectable 25 Gram(s) IV Push once  diltiazem    milliGRAM(s) Oral daily  glucagon  Injectable 1 milliGRAM(s) IntraMuscular once  heparin  Infusion.  Unit(s)/Hr (24 mL/Hr) IV Continuous <Continuous>  hydrALAZINE 25 milliGRAM(s) Oral three times a day  insulin lispro (ADMELOG) corrective regimen sliding scale   SubCutaneous three times a day before meals  insulin lispro (ADMELOG) corrective regimen sliding scale   SubCutaneous at bedtime  metoprolol succinate  milliGRAM(s) Oral daily  pregabalin 25 milliGRAM(s) Oral two times a day  senna 2 Tablet(s) Oral at bedtime  sevelamer carbonate 1600 milliGRAM(s) Oral three times a day with meals    MEDICATIONS  (PRN):  bisacodyl 5 milliGRAM(s) Oral every 12 hours PRN Constipation  dextrose Oral Gel 15 Gram(s) Oral once PRN Blood Glucose LESS THAN 70 milliGRAM(s)/deciliter  heparin   Injectable 37054 Unit(s) IV Push every 6 hours PRN For aPTT less than 40  heparin   Injectable 5000 Unit(s) IV Push every 6 hours PRN For aPTT between 40 - 57  sodium chloride 0.9% lock flush 10 milliLiter(s) IV Push every 1 hour PRN Pre/post blood products, medications, blood draw, and to maintain line patency    	    PHYSICAL EXAM:  T(C): 36.7 (04-14-22 @ 12:44), Max: 36.9 (04-14-22 @ 09:45)  HR: 100 (04-14-22 @ 12:44) (81 - 100)  BP: 170/75 (04-14-22 @ 12:44) (117/70 - 171/80)  RR: 20 (04-14-22 @ 12:44) (18 - 20)  SpO2: 94% (04-14-22 @ 12:44) (93% - 100%)  Wt(kg): --  I&O's Summary    13 Apr 2022 07:01  -  14 Apr 2022 07:00  --------------------------------------------------------  IN: 480 mL / OUT: 2500 mL / NET: -2020 mL        Appearance: Normal	  HEENT:   Normal oral mucosa, PERRL, EOMI	    Cardiovascular: Normal S1 S2, No JVD,   Respiratory: Lungs clear to auscultation	  Psychiatry: A & O   Gastrointestinal:  Soft, Non-tender, + BS	    Neurologic: NPVD  VENOUS STASIS      TELEMETRY: 	    ECG:  	  RADIOLOGY:  OTHER: 	  	  LABS:	 	    CARDIAC MARKERS:                                8.7    13.04 )-----------( 189      ( 14 Apr 2022 05:39 )             28.8     04-14    132<L>  |  94<L>  |  43<H>  ----------------------------<  153<H>  4.3   |  24  |  5.38<H>    Ca    8.7      14 Apr 2022 11:04      proBNP:   Lipid Profile:   HgA1c:   TSH:

## 2022-04-14 NOTE — PROGRESS NOTE ADULT - ASSESSMENT
Impression:  67 year old gentleman pmhx CAD s/p MI/PCI/CABG, hx TIA, afib on pradaxa, HTN, DM2, PVD, gout, L CEA 2014, CKD admitted to ED with ANT, tremors, confusion and lethargy. Receiving new hemodialysis. CT head no acute changes. Mental status improved today. Pt denies any headaches, no slurred speech, no focal facial or limb weakness. Has chronic gout with bilateral finger swelling, pain.     Diagnosis:  Presentation consistent with toxic metabolic encephalopathy secondary to ANT/CKD improving with dialysis, CT head no acute changes, no focal findings on exam, no evidence of acute neurological event at this time.     Recommendations:  Continue HD as per renal  Continue supportive care per primary team  OOB  Physical therapy  Continue stroke prophylaxis- on AC for afib, antiplatelet, statin, optimize hypertension/glycemic control  No further inpatient neurological tests at this time.   d/w daughter at bedside

## 2022-04-14 NOTE — CONSULT NOTE ADULT - SUBJECTIVE AND OBJECTIVE BOX
Interventional Radiology    Evaluate for Procedure: conversion of non-tunnelled to tunnelled HD catheter    HPI: 67 year old male with ATN on CKD on HD via RIJ shiley placed on 4/12 in IR. Patient now requiring longterm HD, IR consulted for conversion of non-tunnelled to tunnelled HD catheter.     Allergies: nitrofurantoin (Nephrotoxicity)    Medications (Abx/Cardiac/Anticoagulation/Blood Products)    aspirin enteric coated: 81 milliGRAM(s) Oral (04-14 @ 13:24)  diltiazem   CD: 120 milliGRAM(s) Oral (04-13 @ 21:53)  heparin  Infusion.: 2700 Unit(s)/Hr IV Continuous (04-14 @ 06:15)  hydrALAZINE: 25 milliGRAM(s) Oral (04-14 @ 13:25)  metoprolol succinate ER: 100 milliGRAM(s) Oral (04-14 @ 05:36)    Data:    T(C): 36.7  HR: 100  BP: 170/75  RR: 20  SpO2: 94%    -WBC 13.04 / HgB 8.7 / Hct 28.8 / Plt 189  -Na 132 / Cl 94 / BUN 43 / Glucose 153  -K 4.3 / CO2 24 / Cr 5.38  -ALT -- / Alk Phos -- / T.Bili --  -INR -- / PTT 69.5    Radiology: < from: IR Procedure (04.12.22 @ 09:52) >  Impression:  Successful placement of a 14 Macanese 15 cm Schon catheter.    < end of copied text >      Assessment/Plan: 67 year old male with ANT on CKD on HD via RIJ shiley placed on 4/12 in IR. Patient now requiring longterm HD, IR consulted for conversion of non-tunnelled to tunnelled HD catheter.     -Please obtain ID clearance prior to tunnelled HD catheter placement.   -Will plan for procedure on Mon 4/18.   -Keep patient NPO after midnight sun night 4/17.   -patient on hep gtt, hold 4 hrs prior to procedure. Please coordinate with IR on day of procedure.   -maintain COVID PCR within 5 days of procedure.   -STAT am labs  -Please contact IR at [phone] with any questions/ concerns regarding above

## 2022-04-14 NOTE — PROGRESS NOTE ADULT - ASSESSMENT
67 year old gentleman with PMH of CAD, NSTEMI s/p 3 stents in 2014 (stents to LAD, proximal and distal LCx), ischemic cardiomyopathy, HTN for 10 years, T2DM for 26 years c/b peripheral neuropathy, CVA, TIA, Afib on Pradaxa, chronic RLE wound, L carotid stenosis s/p endarterectomy (2014) just recently admitted  to the [facility] on 2/19/2022 with acute onset chest pain for one day found to have an NSTEMI, CAD, s/p PCI in setting of increased AF rates off bb for 3 days and resolved chest pain, his CKMB peaked and Echo noted with EF 60%,normal LV function, mild TR, mild CA. He was s/p [facility] with 85% proximal LAD s/p balloon angioplasty and LULU. He presented to [facility] ED with decreased urine output over the week. [facility]. Stevens went to [facility] for hematuria and was started  on Nitrofurantoin. Pt is still taking Nitrofurantoin w/ 5 days left. He came to the ED due to worsening symptoms. Pt reports having a similar incident 4-5 years ago that resolved spontaneously. He reports increased leg edema Dyspnea worse on exertion. his baseline Serum creatinine is in the 2.0 to 2.3 mg/dl range. ANT with serum creatinine of 8.29 with bun 144 and k 5.5        1- ANT on ckd III   2- chf chronic   3- hyperkalemia  on hd   4- uremia improving   5- hyperphosphatemia         ANT likely due to ATN however etiology of his ATN unclear ? due to infection   c3/c4/yari anca anti gbm spep/ipep - negative  HD in am  cont renvela  2 tab with meals   epogen 17371 Units with HD  IR consult for permcath placement  vascular consult for AVF eval  B/L vein mapping ordered  strict I/O  trend creatinine and electrolytes daily

## 2022-04-14 NOTE — PROGRESS NOTE ADULT - SUBJECTIVE AND OBJECTIVE BOX
CARDIOLOGY FOLLOW UP - Dr. Mazariegos  DATE OF SERVICE: 4/14/22     CC no cp or sob  more awake, daughter at bedside       REVIEW OF SYSTEMS:  CONSTITUTIONAL: No fever, weight loss, or fatigue  RESPIRATORY: No cough, wheezing, chills or hemoptysis; No Shortness of Breath  CARDIOVASCULAR: No chest pain, palpitations, passing out, dizziness, or leg swelling  GASTROINTESTINAL: No abdominal or epigastric pain. No nausea, vomiting, or hematemesis; No diarrhea or constipation. No melena or hematochezia.  VASCULAR: No edema     PHYSICAL EXAM:  T(C): 36.9 (04-14-22 @ 09:45), Max: 36.9 (04-14-22 @ 09:45)  HR: 93 (04-14-22 @ 09:45) (81 - 97)  BP: 171/80 (04-14-22 @ 09:45) (117/70 - 171/80)  RR: 18 (04-14-22 @ 09:45) (18 - 20)  SpO2: 93% (04-14-22 @ 09:45) (92% - 100%)  Wt(kg): --  I&O's Summary    13 Apr 2022 07:01  -  14 Apr 2022 07:00  --------------------------------------------------------  IN: 480 mL / OUT: 2500 mL / NET: -2020 mL        Appearance: Normal	  Cardiovascular: Normal S1 S2, irreg   Respiratory:  diminished   Gastrointestinal:  Soft, Non-tender, + BS	  Extremities: le edema +       Home Medications:  allopurinol 100 mg oral tablet: 1 tab(s) orally once a day (03 Apr 2022 06:34)  aspirin 81 mg oral delayed release tablet: 1 tab(s) orally once a day (03 Apr 2022 06:34)  bumetanide 2 mg oral tablet: 1 tab(s) orally once a day (03 Apr 2022 06:34)  insulin glargine 100 units/mL subcutaneous solution: 25 unit(s) subcutaneous once a day (at bedtime) (03 Apr 2022 06:34)  metOLazone 5 mg oral tablet: 1 tab(s) orally 3 times a week (03 Apr 2022 06:34)  metoprolol succinate 50 mg oral tablet, extended release: 2 tab(s) orally once a day (03 Apr 2022 06:34)  NovoLOG 100 units/mL subcutaneous solution: subcutaneous 4 times a day (before meals and at bedtime) (03 Apr 2022 06:34)  NovoLOG FlexPen 100 units/mL injectable solution: 10 unit(s) subcutaneous 3 times a day (03 Apr 2022 06:34)  oxycodone-acetaminophen 5 mg-325 mg oral tablet: 1 tab(s) orally 2 times a day (03 Apr 2022 06:34)  Pradaxa 150 mg oral capsule: 1 cap(s) orally 2 times a day (03 Apr 2022 06:34)  pregabalin 100 mg oral capsule: 1 cap(s) orally 3 times a day (03 Apr 2022 06:34)      MEDICATIONS  (STANDING):  allopurinol 100 milliGRAM(s) Oral daily  aspirin enteric coated 81 milliGRAM(s) Oral daily  atorvastatin 40 milliGRAM(s) Oral at bedtime  chlorhexidine 2% Cloths 1 Application(s) Topical daily  chlorhexidine 4% Liquid 1 Application(s) Topical <User Schedule>  dextrose 5%. 1000 milliLiter(s) (50 mL/Hr) IV Continuous <Continuous>  dextrose 5%. 1000 milliLiter(s) (100 mL/Hr) IV Continuous <Continuous>  dextrose 50% Injectable 25 Gram(s) IV Push once  dextrose 50% Injectable 12.5 Gram(s) IV Push once  dextrose 50% Injectable 25 Gram(s) IV Push once  diltiazem    milliGRAM(s) Oral daily  glucagon  Injectable 1 milliGRAM(s) IntraMuscular once  heparin  Infusion.  Unit(s)/Hr (24 mL/Hr) IV Continuous <Continuous>  hydrALAZINE 25 milliGRAM(s) Oral three times a day  insulin lispro (ADMELOG) corrective regimen sliding scale   SubCutaneous three times a day before meals  insulin lispro (ADMELOG) corrective regimen sliding scale   SubCutaneous at bedtime  metoprolol succinate  milliGRAM(s) Oral daily  pregabalin 25 milliGRAM(s) Oral two times a day  senna 2 Tablet(s) Oral at bedtime  sevelamer carbonate 1600 milliGRAM(s) Oral three times a day with meals      TELEMETRY: 	afib hr 90-110s     ECG:  	  RADIOLOGY:   DIAGNOSTIC TESTING:  [ ] Echocardiogram:  [ ]  Catheterization:  [ ] Stress Test:    OTHER: 	    LABS:	 	                            8.7    13.04 )-----------( 189      ( 14 Apr 2022 05:39 )             28.8     04-12    130<L>  |  93<L>  |  71<H>  ----------------------------<  168<H>  4.5   |  22  |  6.56<H>    Ca    8.8      12 Apr 2022 19:22      PTT - ( 14 Apr 2022 05:39 )  PTT:75.6 sec

## 2022-04-14 NOTE — PROGRESS NOTE ADULT - ASSESSMENT
A/P    66 y/o M with history of CAD, s/p multiple PCI, with most recent 2/22 PCI of LAD in setting of NSTEMI, on ASA only (pradaxa), chronic atrial fibrillation maintained on Pradaxa, essential HTN, type 2 DM previously on oral medications but on insulin, diabetic neuropathy with chronic RIGHT LE venous stasis changes>left, LEFT foot ulcer seen by podiatry, past endarterectomy LEFT CEA in 2014 presenting with 1 week of decreased urine output, cystitis with hematuria     #ANT on CKD, new HD  -oliguric renal failure in setting of ? UTI/cystitis, r/o obstruction ? secondary to abx   -worsened with diuretic use but unlikely due to hypovolemia alone from diuretic use   -hyperkalemia improved   -New HD for uremia, renal failure-  s/p non tunneled HD catheter on 4/12   -renal f/u -sp rrt 4/12 for uncontrolled bleeding sp  right groin shiley removal  - monitor h/h     #AMS/Lethargy  -sig improved post hd   -likely 2/2 Uremic encephalopathy   -ABG noted  -med f/u   -MICU eval noted 4/5  -head ct ok    #Acute on chronic HFpEF  -remains overloaded but improved  -continue aggressive volume removal with HD  -now tolerating po - c/w  bb    #Chronic AF  -rates improved sp cardizem gtt    -c/w metoprolol succ 100 mg qd  -resume dilt cd 120mg daily    -resume oral ac if no further interventions planned     #HTN  -tolerating PO--- c/w meds   -increase hydral if bp remains elevated     #CAD, s/p PCI, most recent 2/2022  -stable, cont ASA, off plavix due to a/c indication  -statin     #R carotid stenosis  -cont med tx  -MRA noted with Greater than 75% stenosis of the right distal common carotid artery. Approximately 60% stenosis of the proximal left internal carotid artery.  -Continue ASA and Statin Therapy  -vasc outpt f/u Dr Pinto    ACP- Advanced Care Planning  -Advanced care planning discussed with patient. Advanced care planning forms discussed with patient and/or family.  Risks, benefits, and alternatives of medical/cardiac procedures were discussed in detail with all questions answered.  30 minutes were spent addressing advance care planning.

## 2022-04-15 NOTE — PROGRESS NOTE ADULT - SUBJECTIVE AND OBJECTIVE BOX
[facility], Division of Infectious Diseases  ADRIANA Seals Y. Patel, S. Shah, G. Casimir  [phone]  ([phone] - weekdays after 5pm and weekends)    Name: DYLAN DEL CASTILLO  Age/Gender: 67y Male  MRN: [id number]    Interval History:  Patient seen and examined this morning.  Patient on HD, has no complaints, wife at bedside.   Notes reviewed. No concerning overnight events  Afebrile     Allergies: nitrofurantoin (Nephrotoxicity)    Objective:  Vitals:   T(F): 98.5 (04-15-22 @ 12:22), Max: 98.6 (04-14-22 @ 21:16)  HR: 86 (04-15-22 @ 12:22) (85 - 96)  BP: 124/63 (04-15-22 @ 12:22) (124/63 - 157/66)  RR: 18 (04-15-22 @ 12:22) (18 - 18)  SpO2: 93% (04-15-22 @ 12:22) (93% - 96%)  Physical Examination:  General: no acute distress, on HD   HEENT: NC/AT, anicteric, neck supple  Respiratory: no acc muscle use, breathing comfortably  Cardiovascular: S1 and S2 present  Gastrointestinal: normal appearing, nondistended  Extremities: no edema, no cyanosis  Skin: no visible rash, right permacath    Laboratory Studies:  CBC:                       8.5    14.31 )-----------( 247      ( 15 Apr 2022 06:51 )             28.9     WBC Trend:  14.31 04-15-22 @ 06:51  13.04 04-14-22 @ 05:39  13.52 04-13-22 @ 22:21  12.24 04-13-22 @ 11:27  11.34 04-12-22 @ 19:22  15.29 04-12-22 @ 07:22  17.29 04-11-22 @ 18:28  14.00 04-11-22 @ 07:02  12.17 04-10-22 @ 07:29  11.80 04-09-22 @ 07:19    CMP: 04-15    132<L>  |  93<L>  |  54<H>  ----------------------------<  159<H>  4.5   |  21<L>  |  6.91<H>    Ca    8.6      15 Apr 2022 06:39  Phos  6.7     04-15    Creatinine, Serum: 6.91 mg/dL (04-15-22 @ 06:39)  Creatinine, Serum: 5.38 mg/dL (04-14-22 @ 11:04)  Creatinine, Serum: 6.56 mg/dL (04-12-22 @ 19:22)  Creatinine, Serum: 5.76 mg/dL (04-12-22 @ 07:22)  Creatinine, Serum: 6.42 mg/dL (04-11-22 @ 07:07)  Creatinine, Serum: 4.71 mg/dL (04-10-22 @ 07:29)  Creatinine, Serum: 6.87 mg/dL (04-09-22 @ 16:11)      Microbiology: reviewed   Culture - Blood (collected 04-04-22 @ 17:46)  Source: .Blood Blood-Peripheral  Final Report (04-09-22 @ 18:00):    No Growth Final    Culture - Blood (collected 04-04-22 @ 17:46)  Source: .Blood Blood-Peripheral  Final Report (04-09-22 @ 18:00):    No Growth Final    Culture - Urine (collected 04-03-22 @ 04:11)  Source: Clean Catch Clean Catch (Midstream)  Final Report (04-04-22 @ 12:00):    <10,000 CFU/mL Normal Urogenital Tiffany    Radiology: reviewed     Medications:  allopurinol 100 milliGRAM(s) Oral daily  aspirin enteric coated 81 milliGRAM(s) Oral daily  atorvastatin 40 milliGRAM(s) Oral at bedtime  bisacodyl 5 milliGRAM(s) Oral every 12 hours PRN  chlorhexidine 2% Cloths 1 Application(s) Topical daily  chlorhexidine 4% Liquid 1 Application(s) Topical <User Schedule>  dextrose 5%. 1000 milliLiter(s) IV Continuous <Continuous>  dextrose 5%. 1000 milliLiter(s) IV Continuous <Continuous>  dextrose 50% Injectable 25 Gram(s) IV Push once  dextrose 50% Injectable 25 Gram(s) IV Push once  dextrose 50% Injectable 12.5 Gram(s) IV Push once  dextrose Oral Gel 15 Gram(s) Oral once PRN  diltiazem    milliGRAM(s) Oral daily  glucagon  Injectable 1 milliGRAM(s) IntraMuscular once  heparin   Injectable 87371 Unit(s) IV Push every 6 hours PRN  heparin   Injectable 5000 Unit(s) IV Push every 6 hours PRN  heparin  Infusion. 2700 Unit(s)/Hr IV Continuous <Continuous>  hydrALAZINE 25 milliGRAM(s) Oral three times a day  insulin lispro (ADMELOG) corrective regimen sliding scale   SubCutaneous three times a day before meals  insulin lispro (ADMELOG) corrective regimen sliding scale   SubCutaneous at bedtime  metoprolol succinate  milliGRAM(s) Oral daily  pregabalin 25 milliGRAM(s) Oral two times a day  senna 2 Tablet(s) Oral at bedtime  sevelamer carbonate 1600 milliGRAM(s) Oral three times a day with meals  sodium chloride 0.9% lock flush 10 milliLiter(s) IV Push every 1 hour PRN    Antimicrobials:

## 2022-04-15 NOTE — PROGRESS NOTE ADULT - ASSESSMENT
Patient is a 67 year old male with PMH of CAD with past multiple PCI, with most recent discharge from [place] in Feb 2022 for NSTEMI with LULU of an 85% prox LAD lesion with patient on ASA and now off Plavix per cardiology (only for one month), chronic afib on Pradaxa, HTN, type 2 DM on insulin, diabetic neuropathy with chronic RIGHT LE venous stasis changes>left, LEFT foot ulcer seen by podiatry, past endarterectomy LEFT CEA in 2014 who presented with UTI with hematuria diagnosed at urgent care and now with decreased urine output.     Acute cystitis/UTI   Leukocytosis   - noted dark urine with hematuria, went to UC and was prescribed macrobid, had some improvement in urine color but then noticed decreased urine output with no voiding reported in last 2 days   - UA here with pyuria -  with large LE, negative nitrites and no bacteria    - CTAP reviewed - no hydronephrosis, nephrolithiasis, or evidence of obstructive uropathy   - urine culture negative   - blood cultures negative   - s/p ceftriaxone x7 day course completed 4/9   - continue to monitor off Abx   - monitor temps/WBC    ANT on CKD3   - Nephrology following, appreciate recs   - s/p placement of shiley and initiation of HD   - pending permacath placement   - monitor Cr    - renally dose meds     B/l LE edema with chronic venous stasis  Acute on chronic HFpEF   - legs cool to touch, nontender, chronic changes with no sign of infection/cellulitis   - has wound on bottom of L foot - dry and does not appear infected either   - clinically overloaded, Cardiology and Nephrology following   - elevate lower extremities      DM2 with neuropathy   - Blood glucose management per primary team.      Dr. Dimitrios Floyd covering service this weekend 4/16-4/17    Daniel Cuevas M.D.  [facility], Division of Infectious Diseases  [phone]  After 5pm on weekdays and all day on weekends - please call [phone]

## 2022-04-15 NOTE — PROGRESS NOTE ADULT - SUBJECTIVE AND OBJECTIVE BOX
[facility]   [phone]  RENAL FOLLOW UP NOTE  --------------------------------------------------------------------------------  Chief Complaint:    24 hour events/subjective:    seen earlier  states feels better  denies shortness of breath     PAST HISTORY  --------------------------------------------------------------------------------  No significant changes to PMH, PSH, FHx, SHx, unless otherwise noted    ALLERGIES & MEDICATIONS  --------------------------------------------------------------------------------  Allergies    nitrofurantoin (Nephrotoxicity)    Intolerances      Standing Inpatient Medications  allopurinol 100 milliGRAM(s) Oral daily  aspirin enteric coated 81 milliGRAM(s) Oral daily  atorvastatin 40 milliGRAM(s) Oral at bedtime  chlorhexidine 2% Cloths 1 Application(s) Topical daily  chlorhexidine 4% Liquid 1 Application(s) Topical <User Schedule>  dextrose 5%. 1000 milliLiter(s) IV Continuous <Continuous>  dextrose 5%. 1000 milliLiter(s) IV Continuous <Continuous>  dextrose 50% Injectable 25 Gram(s) IV Push once  dextrose 50% Injectable 12.5 Gram(s) IV Push once  dextrose 50% Injectable 25 Gram(s) IV Push once  diltiazem    milliGRAM(s) Oral daily  glucagon  Injectable 1 milliGRAM(s) IntraMuscular once  heparin  Infusion. 2700 Unit(s)/Hr IV Continuous <Continuous>  hydrALAZINE 25 milliGRAM(s) Oral three times a day  insulin lispro (ADMELOG) corrective regimen sliding scale   SubCutaneous three times a day before meals  insulin lispro (ADMELOG) corrective regimen sliding scale   SubCutaneous at bedtime  metoprolol succinate  milliGRAM(s) Oral daily  pregabalin 25 milliGRAM(s) Oral two times a day  senna 2 Tablet(s) Oral at bedtime  sevelamer carbonate 1600 milliGRAM(s) Oral three times a day with meals    PRN Inpatient Medications  bisacodyl 5 milliGRAM(s) Oral every 12 hours PRN  dextrose Oral Gel 15 Gram(s) Oral once PRN  heparin   Injectable 46607 Unit(s) IV Push every 6 hours PRN  heparin   Injectable 5000 Unit(s) IV Push every 6 hours PRN  sodium chloride 0.9% lock flush 10 milliLiter(s) IV Push every 1 hour PRN      REVIEW OF SYSTEMS  --------------------------------------------------------------------------------    Gen: denies chills,  CVS: denies chest pain/palpitations  Resp: denies SOB/Cough  GI: Denies N/V/Abd pain  : Denies dysuria        VITALS/PHYSICAL EXAM  --------------------------------------------------------------------------------  T(C): 36.6 (04-15-22 @ 14:15), Max: 37 (04-14-22 @ 21:16)  HR: 85 (04-15-22 @ 14:15) (85 - 90)  BP: 158/65 (04-15-22 @ 14:15) (124/63 - 158/65)  RR: 18 (04-15-22 @ 14:15) (18 - 18)  SpO2: 96% (04-15-22 @ 14:15) (93% - 96%)  Wt(kg): --        04-14-22 @ 07:01  -  04-15-22 @ 07:00  --------------------------------------------------------  IN: 820 mL / OUT: 410 mL / NET: 410 mL    04-15-22 @ 07:01  -  04-15-22 @ 19:18  --------------------------------------------------------  IN: 450 mL / OUT: 2500 mL / NET: -2050 mL      Physical Exam:  	    Gen: Appears comfortable, on RA  	Pulm: Decreased breath sounds b/l bases. no rales ronchi or wheezing  	CV: No JVD. IRR,    	Abd: +BS, soft, nontender, softly distended, obese   	: No suprapubic tenderness.               Extremity UE: increased warmth, swollen fingers B/L               Extremity LE: + hyperpigmented bilateral LE with B/L non-pitting edema- improved, B/L LE tender on palpation              Vascular: R groin HD cath, R IJ HD catheter    LABS/STUDIES  --------------------------------------------------------------------------------              8.5    14.31 >-----------<  247      [04-15-22 @ 06:51]              28.9     132  |  93  |  54  ----------------------------<  159      [04-15-22 @ 06:39]  4.5   |  21  |  6.91        Ca     8.6     [04-15-22 @ 06:39]      Phos  6.7     [04-15-22 @ 10:00]      PT/INR: PT 14.4 , INR 1.25       [04-15-22 @ 06:36]  PTT: 70.7       [04-15-22 @ 06:36]      Creatinine Trend:  SCr 6.91 [04-15 @ 06:39]  SCr 5.38 [04-14 @ 11:04]  SCr 6.56 [04-12 @ 19:22]  SCr 5.76 [04-12 @ 07:22]  SCr 6.42 [04-11 @ 07:07]              Urinalysis - [04-03-22 @ 00:48]      Color Light Orange / Appearance Turbid / SG 1.021 / pH 5.5      Gluc Negative / Ketone Negative  / Bili Negative / Urobili Negative       Blood Large / Protein 300 mg/dL / Leuk Est Large / Nitrite Negative      RBC 20 /  / Hyaline 10 / Gran  / Sq Epi  / Non Sq Epi 3 / Bacteria Negative      Iron 16, TIBC 239, %sat 7      [04-15-22 @ 10:16]  Ferritin 172      [04-15-22 @ 09:05]  PTH -- (Ca 8.3)      [04-15-22 @ 12:18]   150  PTH -- (Ca --)      [04-03-22 @ 23:06]   97  HbA1c 11.7      [11-07-19 @ 09:14]  TSH 1.98      [04-05-22 @ 05:19]    DEREJE: titer Negative, pattern Negative      [04-11-22 @ 16:24]  C3 Complement 61      [04-11-22 @ 16:56]  C4 Complement 28      [04-11-22 @ 16:56]  ANCA: cANCA Negative, pANCA Negative, atypical ANCA Indeterminate Method interference due to DEREJE fluorescence      [04-11-22 @ 16:24]  anti-GBM 3      [04-11-22 @ 19:27]  Free Light Chains: kappa 16.92, lambda 12.63, ratio = 1.34      [04-11 @ 16:56]  Immunofixation Serum: No Monoclonal Band Identified    Reference Range: None Detected      [04-11-22 @ 16:56]  SPEP Interpretation: Normal Electrophoresis Pattern      [04-11-22 @ 16:56]

## 2022-04-15 NOTE — PROGRESS NOTE ADULT - ASSESSMENT
67 year old gentleman with PMH of CAD, NSTEMI s/p 3 stents in 2014 (stents to LAD, proximal and distal LCx), ischemic cardiomyopathy, HTN for 10 years, T2DM for 26 years c/b peripheral neuropathy, CVA, TIA, Afib on Pradaxa, chronic RLE wound, L carotid stenosis s/p endarterectomy (2014) just recently admitted  to the [facility] on 2/19/2022 with acute onset chest pain for one day found to have an NSTEMI, CAD, s/p PCI in setting of increased AF rates off bb for 3 days and resolved chest pain, his CKMB peaked and Echo noted with EF 60%,normal LV function, mild TR, mild NH. He was s/p [facility] with 85% proximal LAD s/p balloon angioplasty and LULU. He presented to [facility] ED with decreased urine output over the week. Mr. Stevens went to [facility] for hematuria and was started  on Nitrofurantoin. Pt is still taking Nitrofurantoin w/ 5 days left. He came to the ED due to worsening symptoms. Pt reports having a similar incident 4-5 years ago that resolved spontaneously. He reports increased leg edema Dyspnea worse on exertion. his baseline Serum creatinine is in the 2.0 to 2.3 mg/dl range. ANT with serum creatinine of 8.29 with bun 144 and k 5.5        1- ANT on ckd III   2- chf chronic   3- hyperkalemia  on hd   4- uremia improving   5- hyperphosphatemia         ANT likely due to ATN however etiology of his ATN unclear ? due to infection recently vs other etiologies    c4/yari anca anti gbm spep/ipep bence pinto normal range  c3 is low etiology unclear   plan for HD today  F180, 240 min, , , 2.5 L fluid removal  cont renvela  2 tab with meals   epogen 30280 U   IR to place permcath  hd again in am   vascular for avf 4/18   strict I/O

## 2022-04-15 NOTE — PROGRESS NOTE ADULT - SUBJECTIVE AND OBJECTIVE BOX
Follow-up Pulm Progress Note    No new respiratory events overnight.  Denies SOB/CP.   Sats 95% RA    Medications:  MEDICATIONS  (STANDING):  allopurinol 100 milliGRAM(s) Oral daily  aspirin enteric coated 81 milliGRAM(s) Oral daily  atorvastatin 40 milliGRAM(s) Oral at bedtime  chlorhexidine 2% Cloths 1 Application(s) Topical daily  chlorhexidine 4% Liquid 1 Application(s) Topical <User Schedule>  dextrose 5%. 1000 milliLiter(s) (50 mL/Hr) IV Continuous <Continuous>  dextrose 5%. 1000 milliLiter(s) (100 mL/Hr) IV Continuous <Continuous>  dextrose 50% Injectable 25 Gram(s) IV Push once  dextrose 50% Injectable 25 Gram(s) IV Push once  dextrose 50% Injectable 12.5 Gram(s) IV Push once  diltiazem    milliGRAM(s) Oral daily  glucagon  Injectable 1 milliGRAM(s) IntraMuscular once  heparin  Infusion. 2700 Unit(s)/Hr (27 mL/Hr) IV Continuous <Continuous>  hydrALAZINE 25 milliGRAM(s) Oral three times a day  insulin lispro (ADMELOG) corrective regimen sliding scale   SubCutaneous three times a day before meals  insulin lispro (ADMELOG) corrective regimen sliding scale   SubCutaneous at bedtime  metoprolol succinate  milliGRAM(s) Oral daily  pregabalin 25 milliGRAM(s) Oral two times a day  senna 2 Tablet(s) Oral at bedtime  sevelamer carbonate 1600 milliGRAM(s) Oral three times a day with meals    MEDICATIONS  (PRN):  bisacodyl 5 milliGRAM(s) Oral every 12 hours PRN Constipation  dextrose Oral Gel 15 Gram(s) Oral once PRN Blood Glucose LESS THAN 70 milliGRAM(s)/deciliter  heparin   Injectable 89710 Unit(s) IV Push every 6 hours PRN For aPTT less than 40  heparin   Injectable 5000 Unit(s) IV Push every 6 hours PRN For aPTT between 40 - 57  sodium chloride 0.9% lock flush 10 milliLiter(s) IV Push every 1 hour PRN Pre/post blood products, medications, blood draw, and to maintain line patency          Vital Signs Last 24 Hrs  T(C): 36.5 (15 Apr 2022 10:00), Max: 37 (14 Apr 2022 21:16)  T(F): 97.7 (15 Apr 2022 10:00), Max: 98.6 (14 Apr 2022 21:16)  HR: 86 (15 Apr 2022 10:00) (85 - 100)  BP: 144/72 (15 Apr 2022 10:00) (133/64 - 170/75)  BP(mean): --  RR: 18 (15 Apr 2022 10:00) (18 - 20)  SpO2: 95% (15 Apr 2022 10:00) (93% - 96%)          04-14 @ 07:01  -  04-15 @ 07:00  --------------------------------------------------------  IN: 820 mL / OUT: 410 mL / NET: 410 mL          LABS:                        8.5    14.31 )-----------( 247      ( 15 Apr 2022 06:51 )             28.9     04-15    132<L>  |  93<L>  |  54<H>  ----------------------------<  159<H>  4.5   |  21<L>  |  6.91<H>    Ca    8.6      15 Apr 2022 06:39  Phos  6.7     04-15            CAPILLARY BLOOD GLUCOSE      POCT Blood Glucose.: 150 mg/dL (15 Apr 2022 08:22)    PT/INR - ( 15 Apr 2022 06:36 )   PT: 14.4 sec;   INR: 1.25 ratio         PTT - ( 15 Apr 2022 06:36 )  PTT:70.7 sec        DEREJE Negative 04-11 @ 16:24  Anti SS-1 --  Anti SS-2 --  Anti RNP --  RF -- 04-11 @ 16:24    Atypical ANCA Indeterminate Method interference due to DEREJE fluorescence 04-11 @ 16:24  c-ANCA titer -- 04-11 @ 16:24  c-ANCA Negative 04-11 @ 16:24  p-ANCA Negative 04-11 @ 16:24              Physical Examination:  PULM: Decreased at bases  CVS: S1, S2 heard    RADIOLOGY REVIEWED  CXR 4/11: unchanged b/l effusions, atelectasis    CT chest: < from: CT Chest No Cont (04.04.22 @ 16:52) >  FINDINGS:    AIRWAYS, LUNGS, PLEURA: Tracheal secretions. Small left greater than   right pleural effusions increased compared to 2/19/2022. Right-sided   pleural thickening. Lingular and left greater than right basilar   opacification, likely atelectasis.    MEDIASTINUM: Cardiomegaly. No pericardial effusion. Thoracic aorta normal   caliber.  No large mediastinal lymph nodes.    IMAGED ABDOMEN: Nonspecific perinephric stranding.    SOFT TISSUES: Unremarkable.    BONES: Unremarkable.      IMPRESSION:.    Small left greater than right bilateral pleural effusions increased in   size compared to 2/19/2022.    Lingular and left greater than right basilar opacification, likely   atelectasis.    --- End of Report ---    < end of copied text >

## 2022-04-15 NOTE — PROGRESS NOTE ADULT - SUBJECTIVE AND OBJECTIVE BOX
Patient is a 67y old  Male who presents with a chief complaint of Decreased urine output for the past week, leg oedema (15 Apr 2022 13:52)      Vascular Surgery Attending Progress Note    Interval HPI: pt w/o c/o  called at bedside by renal attending and pt to eval and take over care for long term hd access    Medications:  allopurinol 100 milliGRAM(s) Oral daily  aspirin enteric coated 81 milliGRAM(s) Oral daily  atorvastatin 40 milliGRAM(s) Oral at bedtime  bisacodyl 5 milliGRAM(s) Oral every 12 hours PRN  chlorhexidine 2% Cloths 1 Application(s) Topical daily  chlorhexidine 4% Liquid 1 Application(s) Topical <User Schedule>  dextrose 5%. 1000 milliLiter(s) IV Continuous <Continuous>  dextrose 5%. 1000 milliLiter(s) IV Continuous <Continuous>  dextrose 50% Injectable 25 Gram(s) IV Push once  dextrose 50% Injectable 12.5 Gram(s) IV Push once  dextrose 50% Injectable 25 Gram(s) IV Push once  dextrose Oral Gel 15 Gram(s) Oral once PRN  diltiazem    milliGRAM(s) Oral daily  glucagon  Injectable 1 milliGRAM(s) IntraMuscular once  heparin   Injectable 14672 Unit(s) IV Push every 6 hours PRN  heparin   Injectable 5000 Unit(s) IV Push every 6 hours PRN  heparin  Infusion. 2700 Unit(s)/Hr IV Continuous <Continuous>  hydrALAZINE 25 milliGRAM(s) Oral three times a day  insulin lispro (ADMELOG) corrective regimen sliding scale   SubCutaneous three times a day before meals  insulin lispro (ADMELOG) corrective regimen sliding scale   SubCutaneous at bedtime  metoprolol succinate  milliGRAM(s) Oral daily  pregabalin 25 milliGRAM(s) Oral two times a day  senna 2 Tablet(s) Oral at bedtime  sevelamer carbonate 1600 milliGRAM(s) Oral three times a day with meals  sodium chloride 0.9% lock flush 10 milliLiter(s) IV Push every 1 hour PRN      Vital Signs Last 24 Hrs  T(C): 36.9 (15 Apr 2022 12:22), Max: 37 (14 Apr 2022 21:16)  T(F): 98.5 (15 Apr 2022 12:22), Max: 98.6 (14 Apr 2022 21:16)  HR: 86 (15 Apr 2022 12:22) (85 - 96)  BP: 124/63 (15 Apr 2022 12:22) (124/63 - 157/66)  BP(mean): --  RR: 18 (15 Apr 2022 12:22) (18 - 18)  SpO2: 93% (15 Apr 2022 12:22) (93% - 96%)  I&O's Summary    14 Apr 2022 07:01  -  15 Apr 2022 07:00  --------------------------------------------------------  IN: 820 mL / OUT: 410 mL / NET: 410 mL    15 Apr 2022 07:01  -  15 Apr 2022 14:13  --------------------------------------------------------  IN: 450 mL / OUT: 0 mL / NET: 450 mL        Physical Exam:  Neuro  A&Ox3 VSS  Vascular:  stable  rt neck shiley in place     LABS:                        8.5    14.31 )-----------( 247      ( 15 Apr 2022 06:51 )             28.9     04-15    132<L>  |  93<L>  |  54<H>  ----------------------------<  159<H>  4.5   |  21<L>  |  6.91<H>    Ca    8.6      15 Apr 2022 06:39  Phos  6.7     04-15      PT/INR - ( 15 Apr 2022 06:36 )   PT: 14.4 sec;   INR: 1.25 ratio         PTT - ( 15 Apr 2022 06:36 )  PTT:70.7 sec    UMAIR MCGRAW MD  [phone] Cell [phone]

## 2022-04-15 NOTE — PROGRESS NOTE ADULT - ASSESSMENT
·  Problem: Acute kidney injury superimposed on CKD. pt currently on hd  to cont as per renal  perm cath done/for tunnel cath on monday  pulm / cards f.u    Lethargy resolved  - CT head no acute event       Problem/Plan - 2:  ·  Problem: Chronic atrial fibrillation. c/w a/c  cards f/u     Problem/Plan - 3:  ·  Problem: Cystitis.   treatment as per id  s/p abs     Problem/Plan - 4:  ·  Problem: Type 2 diabetes mellitus. c/w insulin   endo f/u     Problem/Plan - 5:  ·  Problem: CAD (coronary artery disease).   stable  cards f/u       s/p shiley removal / groin w/ bleeding resolved    vasc f/u

## 2022-04-15 NOTE — PROGRESS NOTE ADULT - ASSESSMENT
68 y/o M with PMH of pleural effusion (2019 s/p R thoracentesis, then R CT placement with course c/b R hydroPTX - tx to [facility] for possible VATs decortication which was deferred), CAD, NSTEMI s/p stents (2014 stents to LAD, proximal and distal LCx, and additional LULU to proximal LAD 2/2022), ischemic cardiomyopathy, HTN, T2DM c/b peripheral neuropathy, CVA, TIA, Afib on Pradaxa, chronic RLE wound, L carotid stenosis s/p endarterectomy (2014). Presented to ED with hematuria and decreased urine output over the past week, was prescribed Nitrofurantoin from an urgent care center. Also with increase in B/L LE edema, weight gain, and orthopnea. On admission, labs notable for creatinine of 7.44 (noted to be 2.2 in 2/2022) - urgent HD initiated 2nd to uremia. Pulmonary called to consult for mild SOB, hx of abnormal CT chest.

## 2022-04-15 NOTE — PROGRESS NOTE ADULT - ASSESSMENT
67y old male w esrd  on hd requiring long term hd access    plan  tent planning for [facility] avf poss g mon\  indications risks and benefits d/w pt who consents  will follow

## 2022-04-15 NOTE — PROGRESS NOTE ADULT - PROBLEM SELECTOR PLAN 1
I Edward Pinto MD have personally  seen and examined the patient today and have noted the findings and formulated the plan of care.  I Edward Pinto MD have personally seen and examined the patient at bedside today at 2 pm

## 2022-04-15 NOTE — PROGRESS NOTE ADULT - SUBJECTIVE AND OBJECTIVE BOX
Admitting Diagnosis:  Chronic kidney disease [N18.9]  CHRONIC KIDNEY DISEASE, UNSPECIFIED    Background:  This is a 67 year old gentleman pmhx CAD s/p MI/PCI/CABG, hx TIA, afib on rivaroxaban, HTN, DM2, PVD, gout, L CEA 2014, and CKD who presented to [facility] 4/11/22 with ANT, tremors, confusion and lethargy.  CT head no acute changes.  S/p initiation of hemodialysis.  Mental status has improved dramatically.  Pt denies any headaches, no slurred speech, no hemiparesis.  He has chronic gout with bilateral finger swelling, pain.  He also has chronic neuropathy.  Both legs are weak.      Interval Hx:  mental status continues to improve.  Legs still quite weak but improved from yesterday.  He is still very deconditioned and has neuropathic pain in his legs.    Restarted pregabalin    IR procedure done  ******    Past Medical History:  HTN (hypertension), benign [401.1]  HLD (hyperlipidemia) [272.4]  DM type 2 (diabetes mellitus, type 2) [250.00]  TIA (transient ischemic attack) [435.9]  Atrial fibrillation [427.31]  MI (myocardial infarction) [410.90]  circa 2014 and 2022.  CAD (coronary artery disease) [414.00]  Neuropathy [G62.9]  Stage 3 chronic kidney disease [N18.30]  2019 novel coronavirus disease (COVID-19) [U07.1]  12/2021.  Received the COVID-19 vaccine x 2 as of April 2022.    Past Surgical History:  Status post angioplasty with stent [V45.82]  LULU x 3 2/7/2014  S/P carotid endarterectomy [Z98.89]  left  S/P CABG (coronary artery bypass graft) [Z95.1]        Social History:  No toxic habits    Family History:  FAMILY HISTORY:  Family history of heart disease  father    Family history of CVA  mother    Allergies:  nitrofurantoin (Nephrotoxicity)    ROS: as per HPI.  Advanced care planning reviewed and noted in the chart.      Medications:  allopurinol 100 milliGRAM(s) Oral daily  aspirin enteric coated 81 milliGRAM(s) Oral daily  atorvastatin 40 milliGRAM(s) Oral at bedtime  bisacodyl 5 milliGRAM(s) Oral every 12 hours PRN  chlorhexidine 2% Cloths 1 Application(s) Topical daily  chlorhexidine 4% Liquid 1 Application(s) Topical <User Schedule>  dextrose 5%. 1000 milliLiter(s) IV Continuous <Continuous>  dextrose 5%. 1000 milliLiter(s) IV Continuous <Continuous>  dextrose 50% Injectable 25 Gram(s) IV Push once  dextrose 50% Injectable 25 Gram(s) IV Push once  dextrose 50% Injectable 12.5 Gram(s) IV Push once  dextrose Oral Gel 15 Gram(s) Oral once PRN  diltiazem    milliGRAM(s) Oral daily  epoetin naz-epbx (RETACRIT) Injectable 90230 Unit(s) IV Push once  glucagon  Injectable 1 milliGRAM(s) IntraMuscular once  heparin   Injectable 06326 Unit(s) IV Push every 6 hours PRN  heparin   Injectable 5000 Unit(s) IV Push every 6 hours PRN  heparin  Infusion. 2700 Unit(s)/Hr IV Continuous <Continuous>  hydrALAZINE 25 milliGRAM(s) Oral three times a day  insulin lispro (ADMELOG) corrective regimen sliding scale   SubCutaneous three times a day before meals  insulin lispro (ADMELOG) corrective regimen sliding scale   SubCutaneous at bedtime  metoprolol succinate  milliGRAM(s) Oral daily  pregabalin 25 milliGRAM(s) Oral two times a day  senna 2 Tablet(s) Oral at bedtime  sevelamer carbonate 1600 milliGRAM(s) Oral three times a day with meals  sodium chloride 0.9% lock flush 10 milliLiter(s) IV Push every 1 hour PRN      Labs:  CBC Full  -  ( 15 Apr 2022 06:51 )  WBC Count : 14.31 K/uL  RBC Count : 3.42 M/uL  Hemoglobin : 8.5 g/dL  Hematocrit : 28.9 %  Platelet Count - Automated : 247 K/uL  Mean Cell Volume : 84.5 fl  Mean Cell Hemoglobin : 24.9 pg  Mean Cell Hemoglobin Concentration : 29.4 gm/dL  Auto Neutrophil # : x  Auto Lymphocyte # : x  Auto Monocyte # : x  Auto Eosinophil # : x  Auto Basophil # : x  Auto Neutrophil % : x  Auto Lymphocyte % : x  Auto Monocyte % : x  Auto Eosinophil % : x  Auto Basophil % : x    04-15    132<L>  |  93<L>  |  54<H>  ----------------------------<  159<H>  4.5   |  21<L>  |  6.91<H>    Ca    8.6      15 Apr 2022 06:39      CAPILLARY BLOOD GLUCOSE      POCT Blood Glucose.: 150 mg/dL (15 Apr 2022 08:22)  POCT Blood Glucose.: 205 mg/dL (14 Apr 2022 22:04)  POCT Blood Glucose.: 189 mg/dL (14 Apr 2022 17:13)  POCT Blood Glucose.: 210 mg/dL (14 Apr 2022 12:28)      PT/INR - ( 15 Apr 2022 06:36 )   PT: 14.4 sec;   INR: 1.25 ratio         PTT - ( 15 Apr 2022 06:36 )  PTT:70.7 sec        Vitals:  Vital Signs Last 24 Hrs  T(C): 36.7 (15 Apr 2022 04:00), Max: 37 (14 Apr 2022 21:16)  T(F): 98.1 (15 Apr 2022 04:00), Max: 98.6 (14 Apr 2022 21:16)  HR: 88 (15 Apr 2022 04:00) (88 - 100)  BP: 157/66 (15 Apr 2022 04:00) (133/64 - 171/80)  BP(mean): --  RR: 18 (15 Apr 2022 04:00) (18 - 20)  SpO2: 93% (15 Apr 2022 04:00) (93% - 96%)      NEUROLOGICAL EXAM:    Mental status: Awake, alert, and in no apparent distress. Oriented to person, place, and time.  Language intact.  Attention grossly intact.  No neglect    Cranial Nerves: Pupils were equal, round, reactive to light. Extraocular movements were intact. B BTT Facial sensation was intact to light touch. There was no facial asymmetry. The palate was upgoing symmetrically and tongue was midline. Hearing acuity was intact to finger rub AU. Shoulder shrug was full bilaterally    Motor exam: Bulk and tone were normal. No downward drift in arms.  both legs minimally antigravity though in part limited by edema, PVD, and neuropathy.  Fine finger movements were symmetric. There was bilateral symmetric pronator drift    Reflexes: DTRs depressed, flexor plantars.     Sensation: Intact to light touch, temperature.    Coordination: no gross dysmetria    Gait: deferred    Imaging:    ACC: [id number] EXAM:  CT BRAIN                        PROCEDURE DATE:  04/09/2022      INTERPRETATION:  CLINICAL INFORMATION:  Altered mental status, on   anticoagulation .    TECHNIQUE: Multiple contiguous axial images were acquired from the   skullbase to the vertex without the administration of intravenous   contrast.  Coronal and sagittal reformations were made.    COMPARISON: CT head 10/21/2021, brain MRI 02/23/2014.    FINDINGS:    Scattered lacunar infarcts in the bilateral cerebralhemispheres are   grossly stable compared to the 10/21/2021 head CT. No new additional   infarcts are noted over the time interval.    No acute intracranial hemorrhage, mass effect, or midline shift. The   brain demonstrates periventricular hypoattenuation, nonspecific in   etiology but likely representing chronic microvascular ischemic changes.    No abnormal extra-axial fluid collections are present.    The ventricles and sulci demonstrate age appropriate involutional   changes.  The basal cisterns are patent.    The orbits are unremarkable. The paranasal sinuses are clear. The mastoid   air cells are clear. The calvarium is intact.    IMPRESSION:    No acute intracranial abnormality is noted. If the patient has new and   persistent symptoms, consider short interval follow-up head CT or brain   MRI.    --- End of Report ---    GATITO VILLARREAL MD; Resident Radiologist  This document has been electronically signed.  JESSE CORONA MD; Attending Radiologist  This document has been electronically signed. Apr 9 2022  2:00PM

## 2022-04-15 NOTE — PROGRESS NOTE ADULT - ASSESSMENT
Impression:  67 year old gentleman pmhx CAD s/p MI/PCI/CABG, hx TIA, afib on pradaxa, HTN, DM2, PVD, gout, L CEA 2014, CKD admitted to ED with ANT, tremors, confusion and lethargy. Receiving new hemodialysis. CT head no acute changes. Mental status improved today. Pt denies any headaches, no slurred speech, no focal facial or limb weakness. Has chronic gout with bilateral finger swelling, pain.     Diagnosis:  Presentation consistent with toxic metabolic encephalopathy secondary to ANT/CKD improving with dialysis, CT head no acute changes, no focal findings on exam, no evidence of acute neurological event at this time.     Recommendations:  Continue HD as per renal  Continue supportive care per primary team  OOB / Physical therapy  Continue stroke prophylaxis- on AC for afib, antiplatelet, statin, optimize hypertension/glycemic control  No further inpatient neurological tests at this time.   d/w pt and wife at bedside

## 2022-04-15 NOTE — PROGRESS NOTE ADULT - SUBJECTIVE AND OBJECTIVE BOX
CARDIOLOGY FOLLOW UP - Dr. Mazariegos  DATE OF SERVICE: 4/15/22     CC no cp or sob      REVIEW OF SYSTEMS:  CONSTITUTIONAL: No fever, weight loss, or fatigue  RESPIRATORY: No cough, wheezing, chills or hemoptysis; No Shortness of Breath  CARDIOVASCULAR: No chest pain, palpitations, passing out, dizziness, or leg swelling  GASTROINTESTINAL: No abdominal or epigastric pain. No nausea, vomiting, or hematemesis; No diarrhea or constipation. No melena or hematochezia.  VASCULAR: No edema     PHYSICAL EXAM:  T(C): 36.9 (04-15-22 @ 12:22), Max: 37 (04-14-22 @ 21:16)  HR: 86 (04-15-22 @ 12:22) (85 - 96)  BP: 124/63 (04-15-22 @ 12:22) (124/63 - 157/66)  RR: 18 (04-15-22 @ 12:22) (18 - 18)  SpO2: 93% (04-15-22 @ 12:22) (93% - 96%)  Wt(kg): --  I&O's Summary    14 Apr 2022 07:01  -  15 Apr 2022 07:00  --------------------------------------------------------  IN: 820 mL / OUT: 410 mL / NET: 410 mL    15 Apr 2022 07:01  -  15 Apr 2022 13:52  --------------------------------------------------------  IN: 450 mL / OUT: 0 mL / NET: 450 mL        Appearance: Normal	  Cardiovascular: Normal S1 S2 irreg   Respiratory: Lungs clear to auscultation	  Gastrointestinal:  Soft, Non-tender, + BS	  Extremities: Normal range of motion, No clubbing, cyanosis or edema      Home Medications:  allopurinol 100 mg oral tablet: 1 tab(s) orally once a day (03 Apr 2022 06:34)  aspirin 81 mg oral delayed release tablet: 1 tab(s) orally once a day (03 Apr 2022 06:34)  bumetanide 2 mg oral tablet: 1 tab(s) orally once a day (03 Apr 2022 06:34)  insulin glargine 100 units/mL subcutaneous solution: 25 unit(s) subcutaneous once a day (at bedtime) (03 Apr 2022 06:34)  metOLazone 5 mg oral tablet: 1 tab(s) orally 3 times a week (03 Apr 2022 06:34)  metoprolol succinate 50 mg oral tablet, extended release: 2 tab(s) orally once a day (03 Apr 2022 06:34)  NovoLOG 100 units/mL subcutaneous solution: subcutaneous 4 times a day (before meals and at bedtime) (03 Apr 2022 06:34)  NovoLOG FlexPen 100 units/mL injectable solution: 10 unit(s) subcutaneous 3 times a day (03 Apr 2022 06:34)  oxycodone-acetaminophen 5 mg-325 mg oral tablet: 1 tab(s) orally 2 times a day (03 Apr 2022 06:34)  Pradaxa 150 mg oral capsule: 1 cap(s) orally 2 times a day (03 Apr 2022 06:34)  pregabalin 100 mg oral capsule: 1 cap(s) orally 3 times a day (03 Apr 2022 06:34)      MEDICATIONS  (STANDING):  allopurinol 100 milliGRAM(s) Oral daily  aspirin enteric coated 81 milliGRAM(s) Oral daily  atorvastatin 40 milliGRAM(s) Oral at bedtime  chlorhexidine 2% Cloths 1 Application(s) Topical daily  chlorhexidine 4% Liquid 1 Application(s) Topical <User Schedule>  dextrose 5%. 1000 milliLiter(s) (100 mL/Hr) IV Continuous <Continuous>  dextrose 5%. 1000 milliLiter(s) (50 mL/Hr) IV Continuous <Continuous>  dextrose 50% Injectable 25 Gram(s) IV Push once  dextrose 50% Injectable 12.5 Gram(s) IV Push once  dextrose 50% Injectable 25 Gram(s) IV Push once  diltiazem    milliGRAM(s) Oral daily  glucagon  Injectable 1 milliGRAM(s) IntraMuscular once  heparin  Infusion. 2700 Unit(s)/Hr (27 mL/Hr) IV Continuous <Continuous>  hydrALAZINE 25 milliGRAM(s) Oral three times a day  insulin lispro (ADMELOG) corrective regimen sliding scale   SubCutaneous three times a day before meals  insulin lispro (ADMELOG) corrective regimen sliding scale   SubCutaneous at bedtime  metoprolol succinate  milliGRAM(s) Oral daily  pregabalin 25 milliGRAM(s) Oral two times a day  senna 2 Tablet(s) Oral at bedtime  sevelamer carbonate 1600 milliGRAM(s) Oral three times a day with meals      TELEMETRY: 	afib hr 70     ECG:  	  RADIOLOGY:   DIAGNOSTIC TESTING:  [ ] Echocardiogram:  [ ]  Catheterization:  [ ] Stress Test:    OTHER: 	    LABS:	 	                            8.5    14.31 )-----------( 247      ( 15 Apr 2022 06:51 )             28.9     04-15    132<L>  |  93<L>  |  54<H>  ----------------------------<  159<H>  4.5   |  21<L>  |  6.91<H>    Ca    8.6      15 Apr 2022 06:39  Phos  6.7     04-15      PT/INR - ( 15 Apr 2022 06:36 )   PT: 14.4 sec;   INR: 1.25 ratio         PTT - ( 15 Apr 2022 06:36 )  PTT:70.7 sec

## 2022-04-15 NOTE — PROGRESS NOTE ADULT - SUBJECTIVE AND OBJECTIVE BOX
DATE OF SERVICE: 04-15-22 @ 10:49  CHIEF COMPLAINT:Patient is a 67y old  Male who presents with a chief complaint of Decreased urine output for the past week, leg oedema (15 Apr 2022 09:28)    	        PAST MEDICAL & SURGICAL HISTORY:  HTN (hypertension), benign    HLD (hyperlipidemia)    DM type 2 (diabetes mellitus, type 2)    TIA (transient ischemic attack)    Atrial fibrillation    MI (myocardial infarction)  circa 2014 and 2022.    CAD (coronary artery disease)    Neuropathy    Stage 3 chronic kidney disease    2019 novel coronavirus disease (COVID-19)  12/2021.  Received the COVID-19 vaccine x 2 as of April 2022.    Status post angioplasty with stent  LULU x 3 2/7/2014    S/P carotid endarterectomy  left            REVIEW OF SYSTEMS:    NECK: No pain or stiffness  RESPIRATORY: No cough, wheezing, chills or hemoptysis; No Shortness of Breath  CARDIOVASCULAR: No chest pain, palpitations,  GASTROINTESTINAL: No abdominal or epigastric pain. No nausea, vomiting, or hematemesis;   GENITOURINARY: No dysuria, frequency, hematuria,   NEUROLOGICAL: No headaches,   lower ext pain better    Medications:  MEDICATIONS  (STANDING):  allopurinol 100 milliGRAM(s) Oral daily  aspirin enteric coated 81 milliGRAM(s) Oral daily  atorvastatin 40 milliGRAM(s) Oral at bedtime  chlorhexidine 2% Cloths 1 Application(s) Topical daily  chlorhexidine 4% Liquid 1 Application(s) Topical <User Schedule>  dextrose 5%. 1000 milliLiter(s) (50 mL/Hr) IV Continuous <Continuous>  dextrose 5%. 1000 milliLiter(s) (100 mL/Hr) IV Continuous <Continuous>  dextrose 50% Injectable 25 Gram(s) IV Push once  dextrose 50% Injectable 12.5 Gram(s) IV Push once  dextrose 50% Injectable 25 Gram(s) IV Push once  diltiazem    milliGRAM(s) Oral daily  glucagon  Injectable 1 milliGRAM(s) IntraMuscular once  heparin  Infusion. 2700 Unit(s)/Hr (27 mL/Hr) IV Continuous <Continuous>  hydrALAZINE 25 milliGRAM(s) Oral three times a day  insulin lispro (ADMELOG) corrective regimen sliding scale   SubCutaneous three times a day before meals  insulin lispro (ADMELOG) corrective regimen sliding scale   SubCutaneous at bedtime  metoprolol succinate  milliGRAM(s) Oral daily  pregabalin 25 milliGRAM(s) Oral two times a day  senna 2 Tablet(s) Oral at bedtime  sevelamer carbonate 1600 milliGRAM(s) Oral three times a day with meals    MEDICATIONS  (PRN):  bisacodyl 5 milliGRAM(s) Oral every 12 hours PRN Constipation  dextrose Oral Gel 15 Gram(s) Oral once PRN Blood Glucose LESS THAN 70 milliGRAM(s)/deciliter  heparin   Injectable 46538 Unit(s) IV Push every 6 hours PRN For aPTT less than 40  heparin   Injectable 5000 Unit(s) IV Push every 6 hours PRN For aPTT between 40 - 57  sodium chloride 0.9% lock flush 10 milliLiter(s) IV Push every 1 hour PRN Pre/post blood products, medications, blood draw, and to maintain line patency    	    PHYSICAL EXAM:  T(C): 37 (04-15-22 @ 09:00), Max: 37 (04-14-22 @ 21:16)  HR: 85 (04-15-22 @ 09:00) (85 - 100)  BP: 133/73 (04-15-22 @ 09:00) (133/64 - 170/75)  RR: 18 (04-15-22 @ 09:00) (18 - 20)  SpO2: 93% (04-15-22 @ 09:00) (93% - 96%)  Wt(kg): --  I&O's Summary    14 Apr 2022 07:01  -  15 Apr 2022 07:00  --------------------------------------------------------  IN: 820 mL / OUT: 410 mL / NET: 410 mL        Appearance: Normal	  HEENT:   Normal oral mucosa, PERRL, EOMI	    Cardiovascular: reg irreg  Respiratory: Lungs clear to auscultation	  Psychiatry: A & O  Gastrointestinal:  Soft, Non-tender, + BS	    Neurologic:   pvd   edema decreased  TELEMETRY: 	    ECG:  	  RADIOLOGY:  OTHER: 	  	  LABS:	 	    CARDIAC MARKERS:                                8.5    14.31 )-----------( 247      ( 15 Apr 2022 06:51 )             28.9     04-15    132<L>  |  93<L>  |  54<H>  ----------------------------<  159<H>  4.5   |  21<L>  |  6.91<H>    Ca    8.6      15 Apr 2022 06:39  Phos  6.7     04-15      proBNP:   Lipid Profile:   HgA1c:   TSH:

## 2022-04-16 NOTE — PROGRESS NOTE ADULT - PROBLEM SELECTOR PLAN 2
HD per renal. -HD per renal  -No pulmonary contraindications for planned AVF creation Monday. D/w Dr. El.

## 2022-04-16 NOTE — PROGRESS NOTE ADULT - SUBJECTIVE AND OBJECTIVE BOX
DATE OF SERVICE: 04-16-22 @ 12:32  CHIEF COMPLAINT:Patient is a 67y old  Male who presents with a chief complaint of Decreased urine output for the past week, leg oedema (16 Apr 2022 08:27)    	        PAST MEDICAL & SURGICAL HISTORY:  HTN (hypertension), benign    HLD (hyperlipidemia)    DM type 2 (diabetes mellitus, type 2)    TIA (transient ischemic attack)    Atrial fibrillation    MI (myocardial infarction)  circa 2014 and 2022.    CAD (coronary artery disease)    Neuropathy    Stage 3 chronic kidney disease    2019 novel coronavirus disease (COVID-19)  12/2021.  Received the COVID-19 vaccine x 2 as of April 2022.    Status post angioplasty with stent  LULU x 3 2/7/2014    S/P carotid endarterectomy  left            REVIEW OF SYSTEMS:    RESPIRATORY: No cough, wheezing, chills or hemoptysis; No Shortness of Breath  CARDIOVASCULAR: No chest pain, palpitations, passing out, dizziness  GASTROINTESTINAL: No abdominal or epigastric pain.   constipation  GENITOURINARY: No dysuria, frequency, hematuria, or incontinence  NEUROLOGICAL: No headaches, memory loss, loss of strength, numbness, or tremors    MUSCULOSKELETAL: No joint pain or swelling; No muscle, back, or extremity pain    Medications:  MEDICATIONS  (STANDING):  allopurinol 100 milliGRAM(s) Oral daily  aspirin enteric coated 81 milliGRAM(s) Oral daily  atorvastatin 40 milliGRAM(s) Oral at bedtime  chlorhexidine 2% Cloths 1 Application(s) Topical daily  chlorhexidine 4% Liquid 1 Application(s) Topical <User Schedule>  dextrose 5%. 1000 milliLiter(s) (50 mL/Hr) IV Continuous <Continuous>  dextrose 5%. 1000 milliLiter(s) (100 mL/Hr) IV Continuous <Continuous>  dextrose 50% Injectable 25 Gram(s) IV Push once  dextrose 50% Injectable 12.5 Gram(s) IV Push once  dextrose 50% Injectable 25 Gram(s) IV Push once  diltiazem    milliGRAM(s) Oral daily  glucagon  Injectable 1 milliGRAM(s) IntraMuscular once  heparin  Infusion. 2700 Unit(s)/Hr (27 mL/Hr) IV Continuous <Continuous>  hydrALAZINE 25 milliGRAM(s) Oral three times a day  insulin lispro (ADMELOG) corrective regimen sliding scale   SubCutaneous three times a day before meals  insulin lispro (ADMELOG) corrective regimen sliding scale   SubCutaneous at bedtime  lactulose Syrup 20 Gram(s) Oral once  metoprolol succinate  milliGRAM(s) Oral daily  pregabalin 25 milliGRAM(s) Oral two times a day  saline laxative (FLEET) Rectal Enema 1 Enema Rectal once  senna 2 Tablet(s) Oral at bedtime  sevelamer carbonate 1600 milliGRAM(s) Oral three times a day with meals    MEDICATIONS  (PRN):  bisacodyl 5 milliGRAM(s) Oral every 12 hours PRN Constipation  dextrose Oral Gel 15 Gram(s) Oral once PRN Blood Glucose LESS THAN 70 milliGRAM(s)/deciliter  heparin   Injectable 18134 Unit(s) IV Push every 6 hours PRN For aPTT less than 40  heparin   Injectable 5000 Unit(s) IV Push every 6 hours PRN For aPTT between 40 - 57  sodium chloride 0.9% lock flush 10 milliLiter(s) IV Push every 1 hour PRN Pre/post blood products, medications, blood draw, and to maintain line patency    	    PHYSICAL EXAM:  T(C): 36.9 (04-16-22 @ 11:16), Max: 37.1 (04-15-22 @ 21:05)  HR: 93 (04-16-22 @ 11:16) (85 - 97)  BP: 136/61 (04-16-22 @ 11:16) (127/75 - 162/67)  RR: 19 (04-16-22 @ 11:16) (18 - 19)  SpO2: 99% (04-16-22 @ 11:16) (93% - 99%)  Wt(kg): --  I&O's Summary    15 Apr 2022 07:01  -  16 Apr 2022 07:00  --------------------------------------------------------  IN: 650 mL / OUT: 2500 mL / NET: -1850 mL        Appearance: Normal	  HEENT:   Normal oral mucosa, PERRL, EOMI	    Cardiovascular: Normal S1 S2, No JVD,   Respiratory: Lungs clear to auscultation	  Psychiatry: A & O   Gastrointestinal:  Soft, Non-tender, + BS	    Neurologic: Non-focal  Extremities: dec rom  tender l knee      TELEMETRY: 	    ECG:  	  RADIOLOGY:  OTHER: 	  	  LABS:	 	    CARDIAC MARKERS:                                9.7    16.42 )-----------( 274      ( 16 Apr 2022 07:45 )             32.3     04-16    133<L>  |  93<L>  |  34<H>  ----------------------------<  172<H>  4.4   |  22  |  5.37<H>    Ca    8.8      16 Apr 2022 07:43  Phos  6.7     04-15      proBNP:   Lipid Profile:   HgA1c:   TSH:

## 2022-04-16 NOTE — PROGRESS NOTE ADULT - ASSESSMENT
67 year old gentleman with PMH of CAD, NSTEMI s/p 3 stents in 2014 (stents to LAD, proximal and distal LCx), ischemic cardiomyopathy, HTN for 10 years, T2DM for 26 years c/b peripheral neuropathy, CVA, TIA, Afib on Pradaxa, chronic RLE wound, L carotid stenosis s/p endarterectomy (2014) just recently admitted  to the [facility] on 2/19/2022 with acute onset chest pain for one day found to have an NSTEMI, CAD, s/p PCI in setting of increased AF rates off bb for 3 days and resolved chest pain, his CKMB peaked and Echo noted with EF 60%,normal LV function, mild TR, mild MD. He was s/p LHC with 85% proximal LAD s/p balloon angioplasty and LULU. He presented to [facility] ED with decreased urine output over the week. Mr. Stevens went to [facility] for hematuria and was started  on Nitrofurantoin. Pt is still taking Nitrofurantoin w/ 5 days left. He came to the ED due to worsening symptoms. Pt reports having a similar incident 4-5 years ago that resolved spontaneously. He reports increased leg edema Dyspnea worse on exertion. his baseline Serum creatinine is in the 2.0 to 2.3 mg/dl range. ANT with serum creatinine of 8.29 with bun 144 and k 5.5    1. ANT in the setting of toxic ATN versus AIN from taking Nitrofurantoin. Now on hemodialysis first dialysis was on 4/03/2022.  2. CKD stage 3 with serum creatinine baseline Scr between 2.0 to 2.3 mg/d.   3. Iron deficiency anemia.    4. High anion gap metabolic acidosis.   5. Cardiomyopathy defer to primary  6. HFpEF2. Volume overloaded.    7. Secondary hyperparathyroidism.   8. Hyponatremia  9. Uremia resolved.      RECOMMEND:  HD today  F180, 210 min,  to 350, , 1.5 L of fluid removal  see HD flowsheet  No epogen given today while on HD  Renvela of 800 mg po TID with meals.   Strict intake and output  BMP, CBC, and phosphorus in am  Monday is for AVF placement that is why we dialyzed him today.   c4/yari anca anti gbm spep/ipep bence pinto normal range  c3 is low etiology unclear   vascular for AVF placement on  4/18   strict I/O      Thank you for involving Nephrology in this patient's care.

## 2022-04-16 NOTE — PROGRESS NOTE ADULT - SUBJECTIVE AND OBJECTIVE BOX
Follow-up Pulm Progress Note    No new respiratory events overnight.  Denies SOB/CP.     Medications:  MEDICATIONS  (STANDING):  allopurinol 100 milliGRAM(s) Oral daily  aspirin enteric coated 81 milliGRAM(s) Oral daily  atorvastatin 40 milliGRAM(s) Oral at bedtime  chlorhexidine 2% Cloths 1 Application(s) Topical daily  chlorhexidine 4% Liquid 1 Application(s) Topical <User Schedule>  dextrose 5%. 1000 milliLiter(s) (50 mL/Hr) IV Continuous <Continuous>  dextrose 5%. 1000 milliLiter(s) (100 mL/Hr) IV Continuous <Continuous>  dextrose 50% Injectable 25 Gram(s) IV Push once  dextrose 50% Injectable 12.5 Gram(s) IV Push once  dextrose 50% Injectable 25 Gram(s) IV Push once  diltiazem    milliGRAM(s) Oral daily  glucagon  Injectable 1 milliGRAM(s) IntraMuscular once  heparin  Infusion. 2700 Unit(s)/Hr (27 mL/Hr) IV Continuous <Continuous>  hydrALAZINE 25 milliGRAM(s) Oral three times a day  insulin lispro (ADMELOG) corrective regimen sliding scale   SubCutaneous three times a day before meals  insulin lispro (ADMELOG) corrective regimen sliding scale   SubCutaneous at bedtime  lactulose Syrup 20 Gram(s) Oral once  metoprolol succinate  milliGRAM(s) Oral daily  pregabalin 25 milliGRAM(s) Oral two times a day  saline laxative (FLEET) Rectal Enema 1 Enema Rectal once  senna 2 Tablet(s) Oral at bedtime  sevelamer carbonate 1600 milliGRAM(s) Oral three times a day with meals    MEDICATIONS  (PRN):  bisacodyl 5 milliGRAM(s) Oral every 12 hours PRN Constipation  dextrose Oral Gel 15 Gram(s) Oral once PRN Blood Glucose LESS THAN 70 milliGRAM(s)/deciliter  heparin   Injectable 74879 Unit(s) IV Push every 6 hours PRN For aPTT less than 40  heparin   Injectable 5000 Unit(s) IV Push every 6 hours PRN For aPTT between 40 - 57  sodium chloride 0.9% lock flush 10 milliLiter(s) IV Push every 1 hour PRN Pre/post blood products, medications, blood draw, and to maintain line patency          Vital Signs Last 24 Hrs  T(C): 36.9 (16 Apr 2022 11:16), Max: 37.1 (15 Apr 2022 21:05)  T(F): 98.4 (16 Apr 2022 11:16), Max: 98.7 (15 Apr 2022 21:05)  HR: 93 (16 Apr 2022 11:16) (85 - 97)  BP: 136/61 (16 Apr 2022 11:16) (127/75 - 162/67)  BP(mean): --  RR: 19 (16 Apr 2022 11:16) (18 - 19)  SpO2: 99% (16 Apr 2022 11:16) (93% - 99%)          04-15 @ 07:01  -  04-16 @ 07:00  --------------------------------------------------------  IN: 650 mL / OUT: 2500 mL / NET: -1850 mL          LABS:                        9.7    16.42 )-----------( 274      ( 16 Apr 2022 07:45 )             32.3     04-16    133<L>  |  93<L>  |  34<H>  ----------------------------<  172<H>  4.4   |  22  |  5.37<H>    Ca    8.8      16 Apr 2022 07:43  Phos  6.7     04-15            CAPILLARY BLOOD GLUCOSE      POCT Blood Glucose.: 186 mg/dL (16 Apr 2022 08:23)    PT/INR - ( 15 Apr 2022 06:36 )   PT: 14.4 sec;   INR: 1.25 ratio         PTT - ( 16 Apr 2022 07:44 )  PTT:52.9 sec        DEREJE Negative 04-11 @ 16:24  Anti SS-1 --  Anti SS-2 --  Anti RNP --  RF -- 04-11 @ 16:24    Atypical ANCA Indeterminate Method interference due to DERJEE fluorescence 04-11 @ 16:24  c-ANCA titer -- 04-11 @ 16:24  c-ANCA Negative 04-11 @ 16:24  p-ANCA Negative 04-11 @ 16:24        Physical Examination:  PULM: Decreased at bases  CVS: S1, S2 heard    RADIOLOGY REVIEWED  CXR 4/11: unchanged b/l effusions, atelectasis    CT chest: < from: CT Chest No Cont (04.04.22 @ 16:52) >  FINDINGS:    AIRWAYS, LUNGS, PLEURA: Tracheal secretions. Small left greater than   right pleural effusions increased compared to 2/19/2022. Right-sided   pleural thickening. Lingular and left greater than right basilar   opacification, likely atelectasis.    MEDIASTINUM: Cardiomegaly. No pericardial effusion. Thoracic aorta normal   caliber.  No large mediastinal lymph nodes.    IMAGED ABDOMEN: Nonspecific perinephric stranding.    SOFT TISSUES: Unremarkable.    BONES: Unremarkable.      IMPRESSION:.    Small left greater than right bilateral pleural effusions increased in   size compared to 2/19/2022.    Lingular and left greater than right basilar opacification, likely   atelectasis.    --- End of Report ---    < end of copied text >   Follow-up Pulm Progress Note    No new respiratory events overnight.  Denies SOB/CP.   Sats 97% RA    Medications:  MEDICATIONS  (STANDING):  allopurinol 100 milliGRAM(s) Oral daily  aspirin enteric coated 81 milliGRAM(s) Oral daily  atorvastatin 40 milliGRAM(s) Oral at bedtime  chlorhexidine 2% Cloths 1 Application(s) Topical daily  chlorhexidine 4% Liquid 1 Application(s) Topical <User Schedule>  dextrose 5%. 1000 milliLiter(s) (50 mL/Hr) IV Continuous <Continuous>  dextrose 5%. 1000 milliLiter(s) (100 mL/Hr) IV Continuous <Continuous>  dextrose 50% Injectable 25 Gram(s) IV Push once  dextrose 50% Injectable 12.5 Gram(s) IV Push once  dextrose 50% Injectable 25 Gram(s) IV Push once  diltiazem    milliGRAM(s) Oral daily  glucagon  Injectable 1 milliGRAM(s) IntraMuscular once  heparin  Infusion. 2700 Unit(s)/Hr (27 mL/Hr) IV Continuous <Continuous>  hydrALAZINE 25 milliGRAM(s) Oral three times a day  insulin lispro (ADMELOG) corrective regimen sliding scale   SubCutaneous three times a day before meals  insulin lispro (ADMELOG) corrective regimen sliding scale   SubCutaneous at bedtime  lactulose Syrup 20 Gram(s) Oral once  metoprolol succinate  milliGRAM(s) Oral daily  pregabalin 25 milliGRAM(s) Oral two times a day  saline laxative (FLEET) Rectal Enema 1 Enema Rectal once  senna 2 Tablet(s) Oral at bedtime  sevelamer carbonate 1600 milliGRAM(s) Oral three times a day with meals    MEDICATIONS  (PRN):  bisacodyl 5 milliGRAM(s) Oral every 12 hours PRN Constipation  dextrose Oral Gel 15 Gram(s) Oral once PRN Blood Glucose LESS THAN 70 milliGRAM(s)/deciliter  heparin   Injectable 42440 Unit(s) IV Push every 6 hours PRN For aPTT less than 40  heparin   Injectable 5000 Unit(s) IV Push every 6 hours PRN For aPTT between 40 - 57  sodium chloride 0.9% lock flush 10 milliLiter(s) IV Push every 1 hour PRN Pre/post blood products, medications, blood draw, and to maintain line patency          Vital Signs Last 24 Hrs  T(C): 36.9 (16 Apr 2022 11:16), Max: 37.1 (15 Apr 2022 21:05)  T(F): 98.4 (16 Apr 2022 11:16), Max: 98.7 (15 Apr 2022 21:05)  HR: 93 (16 Apr 2022 11:16) (85 - 97)  BP: 136/61 (16 Apr 2022 11:16) (127/75 - 162/67)  BP(mean): --  RR: 19 (16 Apr 2022 11:16) (18 - 19)  SpO2: 99% (16 Apr 2022 11:16) (93% - 99%)          04-15 @ 07:01  -  04-16 @ 07:00  --------------------------------------------------------  IN: 650 mL / OUT: 2500 mL / NET: -1850 mL          LABS:                        9.7    16.42 )-----------( 274      ( 16 Apr 2022 07:45 )             32.3     04-16    133<L>  |  93<L>  |  34<H>  ----------------------------<  172<H>  4.4   |  22  |  5.37<H>    Ca    8.8      16 Apr 2022 07:43  Phos  6.7     04-15            CAPILLARY BLOOD GLUCOSE      POCT Blood Glucose.: 186 mg/dL (16 Apr 2022 08:23)    PT/INR - ( 15 Apr 2022 06:36 )   PT: 14.4 sec;   INR: 1.25 ratio         PTT - ( 16 Apr 2022 07:44 )  PTT:52.9 sec        DEREJE Negative 04-11 @ 16:24  Anti SS-1 --  Anti SS-2 --  Anti RNP --  RF -- 04-11 @ 16:24    Atypical ANCA Indeterminate Method interference due to DEREJE fluorescence 04-11 @ 16:24  c-ANCA titer -- 04-11 @ 16:24  c-ANCA Negative 04-11 @ 16:24  p-ANCA Negative 04-11 @ 16:24        Physical Examination:  PULM: Decreased at bases  CVS: S1, S2 heard    RADIOLOGY REVIEWED  CXR 4/11: unchanged b/l effusions, atelectasis    CT chest: < from: CT Chest No Cont (04.04.22 @ 16:52) >  FINDINGS:    AIRWAYS, LUNGS, PLEURA: Tracheal secretions. Small left greater than   right pleural effusions increased compared to 2/19/2022. Right-sided   pleural thickening. Lingular and left greater than right basilar   opacification, likely atelectasis.    MEDIASTINUM: Cardiomegaly. No pericardial effusion. Thoracic aorta normal   caliber.  No large mediastinal lymph nodes.    IMAGED ABDOMEN: Nonspecific perinephric stranding.    SOFT TISSUES: Unremarkable.    BONES: Unremarkable.      IMPRESSION:.    Small left greater than right bilateral pleural effusions increased in   size compared to 2/19/2022.    Lingular and left greater than right basilar opacification, likely   atelectasis.    --- End of Report ---    < end of copied text >

## 2022-04-16 NOTE — PROGRESS NOTE ADULT - SUBJECTIVE AND OBJECTIVE BOX
Covering for Dr. Cordova  --------------------------------------------------------------------------------  Chief Complaint:    24 hour events/subjective:    seen on dialysis getting 1.5 of UF off.   denies shortness of breath     PAST HISTORY  --------------------------------------------------------------------------------  No significant changes to PMH, PSH, FHx, SHx, unless otherwise noted    ALLERGIES & MEDICATIONS  --------------------------------------------------------------------------------  Allergies    nitrofurantoin (Nephrotoxicity)    Intolerances      MEDICATIONS  (STANDING):  allopurinol 100 milliGRAM(s) Oral daily  aspirin enteric coated 81 milliGRAM(s) Oral daily  atorvastatin 40 milliGRAM(s) Oral at bedtime  chlorhexidine 2% Cloths 1 Application(s) Topical daily  chlorhexidine 4% Liquid 1 Application(s) Topical <User Schedule>  dextrose 5%. 1000 milliLiter(s) (100 mL/Hr) IV Continuous <Continuous>  dextrose 5%. 1000 milliLiter(s) (50 mL/Hr) IV Continuous <Continuous>  dextrose 50% Injectable 25 Gram(s) IV Push once  dextrose 50% Injectable 12.5 Gram(s) IV Push once  dextrose 50% Injectable 25 Gram(s) IV Push once  diltiazem    milliGRAM(s) Oral daily  glucagon  Injectable 1 milliGRAM(s) IntraMuscular once  heparin  Infusion. 3000 Unit(s)/Hr (30 mL/Hr) IV Continuous <Continuous>  hydrALAZINE 25 milliGRAM(s) Oral three times a day  insulin lispro (ADMELOG) corrective regimen sliding scale   SubCutaneous three times a day before meals  insulin lispro (ADMELOG) corrective regimen sliding scale   SubCutaneous at bedtime  lactulose Syrup 20 Gram(s) Oral once  lidocaine   4% Patch 1 Patch Transdermal daily  metoprolol succinate  milliGRAM(s) Oral daily  pregabalin 25 milliGRAM(s) Oral two times a day  saline laxative (FLEET) Rectal Enema 1 Enema Rectal once  senna 2 Tablet(s) Oral at bedtime  sevelamer carbonate 1600 milliGRAM(s) Oral three times a day with meals    MEDICATIONS  (PRN):  bisacodyl 5 milliGRAM(s) Oral every 12 hours PRN Constipation  dextrose Oral Gel 15 Gram(s) Oral once PRN Blood Glucose LESS THAN 70 milliGRAM(s)/deciliter  heparin   Injectable 64139 Unit(s) IV Push every 6 hours PRN For aPTT less than 40  heparin   Injectable 5000 Unit(s) IV Push every 6 hours PRN For aPTT between 40 - 57  sodium chloride 0.9% lock flush 10 milliLiter(s) IV Push every 1 hour PRN Pre/post blood products, medications, blood draw, and to maintain line patency      REVIEW OF SYSTEMS  --------------------------------------------------------------------------------    Gen: denies chills,  CVS: denies chest pain/palpitations  Resp: denies SOB/Cough  GI: Denies N/V/Abd pain  : Denies dysuria      Vital Signs Last 24 Hrs  T(C): 36.7 (16 Apr 2022 19:00), Max: 37.4 (16 Apr 2022 15:30)  T(F): 98 (16 Apr 2022 19:00), Max: 99.3 (16 Apr 2022 15:30)  HR: 70 (16 Apr 2022 19:00) (70 - 97)  BP: 122/52 (16 Apr 2022 19:00) (122/52 - 162/67)  RR: 18 (16 Apr 2022 19:00) (17 - 19)  SpO2: 94% (16 Apr 2022 19:00) (92% - 99%)      Physical Exam:  	    Gen:     Appears comfortable, on RA  	Pulm: Decreased breath sounds b/l bases.  	CV: No JVD. IRR,    	Abd: +BS, soft, nontender, softly distended, obese   	: No suprapubic tenderness.               Extremity UE: increased warmth, swollen fingers B/L               Extremity LE: + hyperpigmented bilateral LE with B/L non-pitting edema- improved, B/L LE tender on palpation              Vascular: R IJ HD catheter    LABS/STUDIES  --------------------------------------------------------------------------------                          9.7    16.42 )-----------( 274      ( 16 Apr 2022 07:45 )             32.3       04-16    133<L>  |  93<L>  |  34<H>  ----------------------------<  172<H>  4.4   |  22  |  5.37<H>    Ca    8.8      16 Apr 2022 07:43  Phos  6.7     04-15        PT/INR: PT 14.4 , INR 1.25       [04-15-22 @ 06:36]  PTT: 70.7       [04-15-22 @ 06:36]      Creatinine Trend:  SCr 6.91 [04-15 @ 06:39]  SCr 5.38 [04-14 @ 11:04]  SCr 6.56 [04-12 @ 19:22]  SCr 5.76 [04-12 @ 07:22]  SCr 6.42 [04-11 @ 07:07]    Urinalysis - [04-03-22 @ 00:48]      Color Light Orange / Appearance Turbid / SG 1.021 / pH 5.5      Gluc Negative / Ketone Negative  / Bili Negative / Urobili Negative       Blood Large / Protein 300 mg/dL / Leuk Est Large / Nitrite Negative      RBC 20 /  / Hyaline 10 / Gran  / Sq Epi  / Non Sq Epi 3 / Bacteria Negative      Iron 16, TIBC 239, %sat 7      [04-15-22 @ 10:16]  Ferritin 172      [04-15-22 @ 09:05]  PTH -- (Ca 8.3)      [04-15-22 @ 12:18]   150  PTH -- (Ca --)      [04-03-22 @ 23:06]   97  HbA1c 11.7      [11-07-19 @ 09:14]  TSH 1.98      [04-05-22 @ 05:19]    DEREJE: titer Negative, pattern Negative      [04-11-22 @ 16:24]  C3 Complement 61      [04-11-22 @ 16:56]  C4 Complement 28      [04-11-22 @ 16:56]  ANCA: cANCA Negative, pANCA Negative, atypical ANCA Indeterminate Method interference due to DEREJE fluorescence      [04-11-22 @ 16:24]  anti-GBM 3      [04-11-22 @ 19:27]  Free Light Chains: kappa 16.92, lambda 12.63, ratio = 1.34      [04-11 @ 16:56]  Immunofixation Serum: No Monoclonal Band Identified    Reference Range: None Detected      [04-11-22 @ 16:56]  SPEP Interpretation: Normal Electrophoresis Pattern      [04-11-22 @ 16:56]

## 2022-04-16 NOTE — PROGRESS NOTE ADULT - SUBJECTIVE AND OBJECTIVE BOX
CARDIOLOGY FOLLOW UP - Dr. Mazariegos  Date of Service: 4/16/22  CC: no cp/sob     Review of Systems:  Constitutional: No fever, weight loss, or fatigue  Respiratory: No cough, wheezing, or hemoptysis, no shortness of breath  Cardiovascular: No chest pain, palpitations, passing out, dizziness, or leg swelling  Gastrointestinal: No abd or epigastric pain.  No nausea, vomiting, or hematemesis; no diarrhea or constipation, no melena or hematochezia  Vascular: no edema       PHYSICAL EXAM:  T(C): 36.9 (04-16-22 @ 05:05), Max: 37.1 (04-15-22 @ 21:05)  HR: 95 (04-16-22 @ 05:05) (85 - 97)  BP: 128/69 (04-16-22 @ 05:05) (124/63 - 162/67)  RR: 18 (04-16-22 @ 05:05) (18 - 18)  SpO2: 99% (04-16-22 @ 05:05) (93% - 99%)  Wt(kg): --  I&O's Summary    15 Apr 2022 07:01  -  16 Apr 2022 07:00  --------------------------------------------------------  IN: 650 mL / OUT: 2500 mL / NET: -1850 mL        Appearance: Normal	  Cardiovascular: Normal S1 S2,RRR, No JVD, No murmurs  Respiratory: Lungs clear to auscultation	  Gastrointestinal:  Soft, Non-tender, + BS	  Extremities: Normal range of motion, No clubbing, cyanosis or edema      Home Medications:  allopurinol 100 mg oral tablet: 1 tab(s) orally once a day (03 Apr 2022 06:34)  aspirin 81 mg oral delayed release tablet: 1 tab(s) orally once a day (03 Apr 2022 06:34)  bumetanide 2 mg oral tablet: 1 tab(s) orally once a day (03 Apr 2022 06:34)  insulin glargine 100 units/mL subcutaneous solution: 25 unit(s) subcutaneous once a day (at bedtime) (03 Apr 2022 06:34)  metOLazone 5 mg oral tablet: 1 tab(s) orally 3 times a week (03 Apr 2022 06:34)  metoprolol succinate 50 mg oral tablet, extended release: 2 tab(s) orally once a day (03 Apr 2022 06:34)  NovoLOG 100 units/mL subcutaneous solution: subcutaneous 4 times a day (before meals and at bedtime) (03 Apr 2022 06:34)  NovoLOG FlexPen 100 units/mL injectable solution: 10 unit(s) subcutaneous 3 times a day (03 Apr 2022 06:34)  oxycodone-acetaminophen 5 mg-325 mg oral tablet: 1 tab(s) orally 2 times a day (03 Apr 2022 06:34)  Pradaxa 150 mg oral capsule: 1 cap(s) orally 2 times a day (03 Apr 2022 06:34)  pregabalin 100 mg oral capsule: 1 cap(s) orally 3 times a day (03 Apr 2022 06:34)      MEDICATIONS  (STANDING):  allopurinol 100 milliGRAM(s) Oral daily  aspirin enteric coated 81 milliGRAM(s) Oral daily  atorvastatin 40 milliGRAM(s) Oral at bedtime  chlorhexidine 2% Cloths 1 Application(s) Topical daily  chlorhexidine 4% Liquid 1 Application(s) Topical <User Schedule>  dextrose 5%. 1000 milliLiter(s) (50 mL/Hr) IV Continuous <Continuous>  dextrose 5%. 1000 milliLiter(s) (100 mL/Hr) IV Continuous <Continuous>  dextrose 50% Injectable 25 Gram(s) IV Push once  dextrose 50% Injectable 12.5 Gram(s) IV Push once  dextrose 50% Injectable 25 Gram(s) IV Push once  diltiazem    milliGRAM(s) Oral daily  glucagon  Injectable 1 milliGRAM(s) IntraMuscular once  heparin  Infusion. 2700 Unit(s)/Hr (27 mL/Hr) IV Continuous <Continuous>  hydrALAZINE 25 milliGRAM(s) Oral three times a day  insulin lispro (ADMELOG) corrective regimen sliding scale   SubCutaneous three times a day before meals  insulin lispro (ADMELOG) corrective regimen sliding scale   SubCutaneous at bedtime  metoprolol succinate  milliGRAM(s) Oral daily  pregabalin 25 milliGRAM(s) Oral two times a day  senna 2 Tablet(s) Oral at bedtime  sevelamer carbonate 1600 milliGRAM(s) Oral three times a day with meals      TELEMETRY: 	afib 80-100s     ECG:  	  RADIOLOGY:   DIAGNOSTIC TESTING:  [ ] Echocardiogram:  [ ]  Catheterization:  [ ] Stress Test:    OTHER: 	    LABS:	 	                            9.7    16.42 )-----------( 274      ( 16 Apr 2022 07:45 )             32.3     04-15    132<L>  |  93<L>  |  54<H>  ----------------------------<  159<H>  4.5   |  21<L>  |  6.91<H>    Ca    8.6      15 Apr 2022 06:39  Phos  6.7     04-15      PT/INR - ( 15 Apr 2022 06:36 )   PT: 14.4 sec;   INR: 1.25 ratio         PTT - ( 16 Apr 2022 07:44 )  PTT:52.9 sec

## 2022-04-16 NOTE — PROGRESS NOTE ADULT - ASSESSMENT
·  Problem: Acute kidney injury superimposed on CKD. pt currently on hd  to cont as per renal  perm cath done  avf on monday  medically stable   pulm / cards f.u for clearance    Lethargy resolved  - CT head no acute event       Problem/Plan - 2:  ·  Problem: Chronic atrial fibrillation. c/w a/c  cards f/u     Problem/Plan - 3:  ·  Problem: Cystitis.   treatment as per id  s/p abs     Problem/Plan - 4:  ·  Problem: Type 2 diabetes mellitus. c/w insulin   endo f/u     Problem/Plan - 5:  ·  Problem: CAD (coronary artery disease).   stable  cards f/u       s/p shiley removal / groin w/ bleeding resolved    vasc f/u   constipation  bowel regimem  enema  lactulose    gouty pain  ? steroids /   add lidoderm patch to l knee

## 2022-04-16 NOTE — PROGRESS NOTE ADULT - ASSESSMENT
A/P    66 y/o M with history of CAD, s/p multiple PCI, with most recent 2/22 PCI of LAD in setting of NSTEMI, on ASA only (pradaxa), chronic atrial fibrillation maintained on Pradaxa, essential HTN, type 2 DM previously on oral medications but on insulin, diabetic neuropathy with chronic RIGHT LE venous stasis changes>left, LEFT foot ulcer seen by podiatry, past endarterectomy LEFT CEA in 2014 presenting with 1 week of decreased urine output, cystitis with hematuria     #ANT on CKD, new HD  -oliguric renal failure in setting of ? UTI/cystitis, r/o obstruction ? secondary to abx   -worsened with diuretic use but unlikely due to hypovolemia alone from diuretic use   -hyperkalemia improved   -New HD for uremia, renal failure- sp RIJ shiley placed on 4/12 in IR.   -renal f/u -sp rrt 4/12 for uncontrolled bleeding sp  right groin shiley removal  - monitor h/h - stable  -IR following planning tunnelled HD catheter placement. on Mon 4/18. - pt can proceed from a cv standpoint   -vasc possible plans for avf mon - can proceed from a cv standpoint     #AMS/Lethargy  -sig improved post hd   -likely 2/2 Uremic encephalopathy   -ABG noted  -med f/u   -MICU eval noted 4/5  -head ct ok    #Acute on chronic HFpEF  -remains overloaded but improved  -continue aggressive volume removal with HD  -now tolerating po - c/w  bb    #Chronic AF  -rates improved sp cardizem gtt    -c/w metoprolol succ 100 mg qd  -c/w dilt cd 120mg daily    -c.w hep gtt     #HTN  -tolerating PO--- c/w meds   -increase hydral if bp remains elevated     #CAD, s/p PCI, most recent 2/2022  -stable, cont ASA, off plavix due to a/c indication  -statin     #R carotid stenosis  -cont med tx  -MRA noted with Greater than 75% stenosis of the right distal common carotid artery. Approximately 60% stenosis of the proximal left internal carotid artery.  -Continue ASA and Statin Therapy  -vasc outpt f/u Dr Pinto    ACP- Advanced Care Planning  -Advanced care planning discussed with patient. Advanced care planning forms discussed with patient and/or family.  Risks, benefits, and alternatives of medical/cardiac procedures were discussed in detail with all questions answered.  30 minutes were spent addressing advance care planning.

## 2022-04-17 NOTE — PROGRESS NOTE ADULT - ASSESSMENT
A/P    68 y/o M with history of CAD, s/p multiple PCI, with most recent 2/22 PCI of LAD in setting of NSTEMI, on ASA only (pradaxa), chronic atrial fibrillation maintained on Pradaxa, essential HTN, type 2 DM previously on oral medications but on insulin, diabetic neuropathy with chronic RIGHT LE venous stasis changes>left, LEFT foot ulcer seen by podiatry, past endarterectomy LEFT CEA in 2014 presenting with 1 week of decreased urine output, cystitis with hematuria     #ANT on CKD, new HD  -oliguric renal failure in setting of ? UTI/cystitis, r/o obstruction ? secondary to abx   -worsened with diuretic use but unlikely due to hypovolemia alone from diuretic use   -hyperkalemia improved   -New HD for uremia, renal failure- sp RIJ shiley placed on 4/12 in IR.   -renal f/u -sp rrt 4/12 for uncontrolled bleeding sp  right groin shiley removal  - monitor h/h - stable  -IR following planning tunnelled HD catheter placement. on Mon 4/18. - pt can proceed from a cv standpoint   -vasc possible plans for avf mon - can proceed from a cv standpoint     #AMS/Lethargy  -sig improved post hd   -likely 2/2 Uremic encephalopathy   -ABG noted  -med f/u   -MICU eval noted 4/5  -head ct ok    #Acute on chronic HFpEF  -remains overloaded but improved  -continue aggressive volume removal with HD  -now tolerating po - c/w  bb    #Chronic AF  -rates improved sp cardizem gtt    -c/w metoprolol succ 100 mg qd  -c/w dilt cd 120mg daily    -c.w hep gtt     #HTN  -tolerating PO--- c/w meds   -increase hydral if bp remains elevated     #CAD, s/p PCI, most recent 2/2022  -stable, cont ASA, off plavix due to a/c indication  -statin     #R carotid stenosis  -cont med tx  -MRA noted with Greater than 75% stenosis of the right distal common carotid artery. Approximately 60% stenosis of the proximal left internal carotid artery.  -Continue ASA and Statin Therapy  -vasc outpt f/u Dr Pinto    ACP- Advanced Care Planning  -Advanced care planning discussed with patient. Advanced care planning forms discussed with patient and/or family.  Risks, benefits, and alternatives of medical/cardiac procedures were discussed in detail with all questions answered.  30 minutes were spent addressing advance care planning.

## 2022-04-17 NOTE — PROGRESS NOTE ADULT - SUBJECTIVE AND OBJECTIVE BOX
DATE OF SERVICE: 04-17-22 @ 15:27  CHIEF COMPLAINT:Patient is a 67y old  Male who presents with a chief complaint of Decreased urine output for the past week, leg oedema (17 Apr 2022 13:45)    	        PAST MEDICAL & SURGICAL HISTORY:  HTN (hypertension), benign    HLD (hyperlipidemia)    DM type 2 (diabetes mellitus, type 2)    TIA (transient ischemic attack)    Atrial fibrillation    MI (myocardial infarction)  circa 2014 and 2022.    CAD (coronary artery disease)    Neuropathy    Stage 3 chronic kidney disease    2019 novel coronavirus disease (COVID-19)  12/2021.  Received the COVID-19 vaccine x 2 as of April 2022.    Status post angioplasty with stent  LULU x 3 2/7/2014    S/P carotid endarterectomy  left          feels better overall  RESPIRATORY: No cough, wheezing, chills or hemoptysis; No Shortness of Breath  CARDIOVASCULAR: No chest pain, palpitations, passing out,  GASTROINTESTINAL: No abdominal or epigastric pain. No nausea, vomiting,   +constipation  GENITOURINARY: No dysuria, frequency, hematuria, or incontinence  NEUROLOGICAL: No headaches,    MUSCULOSKELETAL:l knee pain     Medications:  MEDICATIONS  (STANDING):  allopurinol 100 milliGRAM(s) Oral daily  aspirin enteric coated 81 milliGRAM(s) Oral daily  atorvastatin 40 milliGRAM(s) Oral at bedtime  chlorhexidine 2% Cloths 1 Application(s) Topical daily  chlorhexidine 4% Liquid 1 Application(s) Topical <User Schedule>  dextrose 5%. 1000 milliLiter(s) (100 mL/Hr) IV Continuous <Continuous>  dextrose 5%. 1000 milliLiter(s) (50 mL/Hr) IV Continuous <Continuous>  dextrose 50% Injectable 25 Gram(s) IV Push once  dextrose 50% Injectable 12.5 Gram(s) IV Push once  dextrose 50% Injectable 25 Gram(s) IV Push once  diltiazem    milliGRAM(s) Oral daily  glucagon  Injectable 1 milliGRAM(s) IntraMuscular once  heparin  Infusion. 3000 Unit(s)/Hr (30 mL/Hr) IV Continuous <Continuous>  hydrALAZINE 25 milliGRAM(s) Oral three times a day  insulin lispro (ADMELOG) corrective regimen sliding scale   SubCutaneous three times a day before meals  insulin lispro (ADMELOG) corrective regimen sliding scale   SubCutaneous at bedtime  lidocaine   4% Patch 1 Patch Transdermal daily  lidocaine   4% Patch 1 Patch Transdermal daily  metoprolol succinate  milliGRAM(s) Oral daily  pregabalin 25 milliGRAM(s) Oral two times a day  saline laxative (FLEET) Rectal Enema 1 Enema Rectal once  senna 2 Tablet(s) Oral at bedtime  sevelamer carbonate 1600 milliGRAM(s) Oral three times a day with meals    MEDICATIONS  (PRN):  bisacodyl 5 milliGRAM(s) Oral every 12 hours PRN Constipation  dextrose Oral Gel 15 Gram(s) Oral once PRN Blood Glucose LESS THAN 70 milliGRAM(s)/deciliter  heparin   Injectable 20549 Unit(s) IV Push every 6 hours PRN For aPTT less than 40  heparin   Injectable 5000 Unit(s) IV Push every 6 hours PRN For aPTT between 40 - 57  sodium chloride 0.9% lock flush 10 milliLiter(s) IV Push every 1 hour PRN Pre/post blood products, medications, blood draw, and to maintain line patency    	    PHYSICAL EXAM:  T(C): 36.9 (04-17-22 @ 13:15), Max: 37.4 (04-16-22 @ 15:30)  HR: 87 (04-17-22 @ 13:15) (70 - 89)  BP: 146/64 (04-17-22 @ 13:15) (116/68 - 149/63)  RR: 18 (04-17-22 @ 13:15) (17 - 18)  SpO2: 93% (04-17-22 @ 13:15) (92% - 99%)  Wt(kg): --  I&O's Summary    16 Apr 2022 07:01  -  17 Apr 2022 07:00  --------------------------------------------------------  IN: 0 mL / OUT: 1500 mL / NET: -1500 mL    17 Apr 2022 07:01  -  17 Apr 2022 15:27  --------------------------------------------------------  IN: 480 mL / OUT: 0 mL / NET: 480 mL        Appearance: Normal	  HEENT:   Normal oral mucosa, PERRL, EOMI	  Lymphatic: No lymphadenopathy  Cardiovascular: reg irr  Respiratory: Lungs clear to auscultation	  Psychiatry: A & O x 3, M  Gastrointestinal:  Soft, Non-tender, + BS	    Neurologic: Non-focal  Extremities: pvd  l knee tender    TELEMETRY: 	    ECG:  	  RADIOLOGY:  OTHER: 	  	  LABS:	 	    CARDIAC MARKERS:                                9.0    19.34 )-----------( 261      ( 17 Apr 2022 04:55 )             30.2     04-17    131<L>  |  94<L>  |  34<H>  ----------------------------<  207<H>  4.2   |  21<L>  |  4.90<H>    Ca    8.6      17 Apr 2022 04:55  Phos  4.3     04-17  Mg     2.3     04-17      proBNP:   Lipid Profile:   HgA1c:   TSH:

## 2022-04-17 NOTE — PROGRESS NOTE ADULT - ASSESSMENT
Patient is a 67 year old male with PMH of CAD with past multiple PCI, with most recent discharge from [facility] in Feb 2022 for NSTEMI with LULU of an 85% prox LAD lesion with patient on ASA and now off Plavix per cardiology (only for one month), chronic afib on Pradaxa, HTN, type 2 DM on insulin, diabetic neuropathy with chronic RIGHT LE venous stasis changes>left, LEFT foot ulcer seen by podiatry, past endarterectomy LEFT CEA in 2014 who presented with UTI with hematuria diagnosed at urgent care and now with decreased urine output.     Plan to take patient to OR tomorrow (Monday 4/18) for LUE AVF creation, possible graft placement.    Acute cystitis/UTI/Leukocytosis   - noted dark urine with hematuria, went to UC and was prescribed macrobid, had some improvement in urine color but then noticed decreased urine output with no voiding reported in last 2 days   - UA here with pyuria -  with large LE, negative nitrites and no bacteria    - CTAP reviewed - no hydronephrosis, nephrolithiasis, or evidence of obstructive uropathy   - urine culture negative   - blood cultures negative   - s/p ceftriaxone x7 day course completed 4/9   - continue to monitor off Abx    ANT on CKD3   - Nephrology following, appreciate recs   - s/p placement of shiley and initiation of HD   - pending permacath placement   - monitor Cr    - renally dose meds     B/l LE edema with chronic venous stasis  Acute on chronic HFpEF   - legs cool to touch, nontender, chronic changes with no sign of infection/cellulitis   - has wound on bottom of L foot - dry and does not appear infected either   - clinically overloaded, Cardiology and Nephrology following   - elevate lower extremities      DM2 with neuropathy   - Blood glucose management per primary team.      -no issues from ID perspective with proceeding to OR    We will follow along in the care of this patient. Please call us at [phone] or text me directly on my cell# at [phone] with any concerns.

## 2022-04-17 NOTE — PROGRESS NOTE ADULT - ASSESSMENT
Patient is a 67 year old male with PMH of CAD with past multiple PCI, with most recent discharge from [facility] in Feb 2022 for NSTEMI with LULU of an 85% prox LAD lesion with patient on ASA and now off Plavix per cardiology (only for one month), chronic afib on Pradaxa, HTN, type 2 DM on insulin, diabetic neuropathy with chronic RIGHT LE venous stasis changes>left, LEFT foot ulcer seen by podiatry, past endarterectomy LEFT CEA in 2014 who presented with UTI with hematuria diagnosed at urgent care and now with decreased urine output.     Plan to take patient to OR tomorrow (Monday 4/18) for LUE AVF creation, possible graft placement.    Acute cystitis/UTI/Leukocytosis   - noted dark urine with hematuria, went to UC and was prescribed macrobid, had some improvement in urine color but then noticed decreased urine output with no voiding reported in last 2 days   - UA here with pyuria -  with large LE, negative nitrites and no bacteria    - CTAP reviewed - no hydronephrosis, nephrolithiasis, or evidence of obstructive uropathy   - urine culture negative   - blood cultures negative   - s/p ceftriaxone x7 day course completed 4/9 4/17-elevated wbc but no eosinopenia or obv localizing concerns, no fever, suspect this is reactive and not due to infection but rather demargination   - continue to monitor off Abx    ANT on CKD3   - Nephrology following, appreciate recs   - s/p placement of shiley and initiation of HD   - pending permacath placement   - monitor Cr    - renally dose meds     B/l LE edema with chronic venous stasis  Acute on chronic HFpEF   - legs cool to touch, nontender, chronic changes with no sign of infection/cellulitis   - has wound on bottom of L foot - dry and does not appear infected either   - clinically overloaded, Cardiology and Nephrology following   - elevate lower extremities      DM2 with neuropathy   - Blood glucose management per primary team.      -no issues from ID perspective with proceeding to OR but note that we have no obv  for leukocytosis    We will follow along in the care of this patient. Please call us at [phone] or text me directly on my cell# at [phone] with any concerns.

## 2022-04-17 NOTE — PROGRESS NOTE ADULT - SUBJECTIVE AND OBJECTIVE BOX
[facility] DIVISION of INFECTIOUS DISEASE  Dimitrios Floyd MD PhD, Gela Davalos MD, Annie King MD, Daniel Cuevas MD, Eugene Meeks MD  and providing coverage with Donavon Barrera MD  Providing Infectious Disease Consultations at [facility], [facility], [facility]'s    Office# [phone] to schedule follow up appointments  Answering Service for urgent calls or New Consults [phone]  Cell# to text for urgent issues Dimitrios Floyd [phone]     infectious diseases progress note:    DYLAN DEL CASTILLO is a 67y y. o. Male patient    Patient with no new concerns    Allergies    nitrofurantoin (Nephrotoxicity)    Intolerances        ANTIBIOTICS/RELEVANT:  antimicrobials    immunologic:    OTHER:  allopurinol 100 milliGRAM(s) Oral daily  aspirin enteric coated 81 milliGRAM(s) Oral daily  atorvastatin 40 milliGRAM(s) Oral at bedtime  bisacodyl 5 milliGRAM(s) Oral every 12 hours PRN  chlorhexidine 2% Cloths 1 Application(s) Topical daily  chlorhexidine 4% Liquid 1 Application(s) Topical <User Schedule>  dextrose 5%. 1000 milliLiter(s) IV Continuous <Continuous>  dextrose 5%. 1000 milliLiter(s) IV Continuous <Continuous>  dextrose 50% Injectable 25 Gram(s) IV Push once  dextrose 50% Injectable 12.5 Gram(s) IV Push once  dextrose 50% Injectable 25 Gram(s) IV Push once  dextrose Oral Gel 15 Gram(s) Oral once PRN  diltiazem    milliGRAM(s) Oral daily  glucagon  Injectable 1 milliGRAM(s) IntraMuscular once  heparin   Injectable 06072 Unit(s) IV Push every 6 hours PRN  heparin   Injectable 5000 Unit(s) IV Push every 6 hours PRN  heparin  Infusion. 3000 Unit(s)/Hr IV Continuous <Continuous>  hydrALAZINE 25 milliGRAM(s) Oral three times a day  insulin lispro (ADMELOG) corrective regimen sliding scale   SubCutaneous three times a day before meals  insulin lispro (ADMELOG) corrective regimen sliding scale   SubCutaneous at bedtime  lidocaine   4% Patch 1 Patch Transdermal daily  lidocaine   4% Patch 1 Patch Transdermal daily  metoprolol succinate  milliGRAM(s) Oral daily  pregabalin 25 milliGRAM(s) Oral two times a day  senna 2 Tablet(s) Oral at bedtime  sevelamer carbonate 1600 milliGRAM(s) Oral three times a day with meals  sodium chloride 0.9% lock flush 10 milliLiter(s) IV Push every 1 hour PRN      Objective:  Last 24-Vital Signs Last 24 Hrs  T(C): 36.6 (17 Apr 2022 17:30), Max: 37.1 (17 Apr 2022 09:00)  T(F): 97.8 (17 Apr 2022 17:30), Max: 98.7 (17 Apr 2022 09:00)  HR: 81 (17 Apr 2022 17:30) (81 - 89)  BP: 146/72 (17 Apr 2022 17:30) (116/68 - 149/63)  BP(mean): --  RR: 18 (17 Apr 2022 17:30) (17 - 18)  SpO2: 98% (17 Apr 2022 17:30) (93% - 99%)    T(C): 36.6 (04-17-22 @ 17:30), Max: 37.4 (04-16-22 @ 15:30)  T(F): 97.8 (04-17-22 @ 17:30), Max: 99.3 (04-16-22 @ 15:30)  T(C): 36.6 (04-17-22 @ 17:30), Max: 37.4 (04-16-22 @ 15:30)  T(F): 97.8 (04-17-22 @ 17:30), Max: 99.3 (04-16-22 @ 15:30)  T(C): 36.6 (04-17-22 @ 17:30), Max: 37.4 (04-16-22 @ 15:30)  T(F): 97.8 (04-17-22 @ 17:30), Max: 99.3 (04-16-22 @ 15:30)    PHYSICAL EXAM:  Constitutional: Well-developed, well nourished, Obese  Eyes: PERRLA, EOMI  Ear/Nose/Throat: oropharynx normal	  Neck: no JVD, no lymphadenopathy, supple  Respiratory: no accessory muscle use, lung fields bilaterally clear  Cardiovascular: distnat  Gastrointestinal: soft, NT, no HSM, BS-normal  Extremities: no clubbing, no cyanosis, edema absent  Neuro: patient alert,        LABS:                        9.2    20.28 )-----------( 325      ( 17 Apr 2022 16:50 )             30.6       WBC 20.28  04-17 @ 16:50  WBC 19.34  04-17 @ 04:55  WBC 19.43  04-17 @ 00:35  WBC 16.42  04-16 @ 07:45  WBC 14.31  04-15 @ 06:51  WBC 13.04  04-14 @ 05:39  WBC 13.52  04-13 @ 22:21  WBC 12.24  04-13 @ 11:27  WBC 11.34  04-12 @ 19:22  WBC 15.29  04-12 @ 07:22  WBC 17.29  04-11 @ 18:28  WBC 14.00  04-11 @ 07:02      04-17    131<L>  |  94<L>  |  34<H>  ----------------------------<  207<H>  4.2   |  21<L>  |  4.90<H>    Ca    8.6      17 Apr 2022 04:55  Phos  4.3     04-17  Mg     2.3     04-17        Creatinine, Serum: 4.90 mg/dL (04-17-22 @ 04:55)  Creatinine, Serum: 5.37 mg/dL (04-16-22 @ 07:43)  Creatinine, Serum: 6.91 mg/dL (04-15-22 @ 06:39)  Creatinine, Serum: 5.38 mg/dL (04-14-22 @ 11:04)  Creatinine, Serum: 6.56 mg/dL (04-12-22 @ 19:22)  Creatinine, Serum: 5.76 mg/dL (04-12-22 @ 07:22)  Creatinine, Serum: 6.42 mg/dL (04-11-22 @ 07:07)      PTT - ( 17 Apr 2022 16:50 )  PTT:55.5 sec      Procalcitonin, Serum (04.17.22 @ 16:50)    Procalcitonin, Serum: 0.80:  ng/mL        MICROBIOLOGY:  COVID-19 PCR (04.15.22 @ 06:52)    COVID-19 PCR: NotDetec:       RADIOLOGY & ADDITIONAL STUDIES:  < from: VA Duplex Upper Ext Vein Scan, Bilat (04.16.22 @ 11:28) >    ACC: [id number] EXAM:  DUPLEX SCAN EXT VEINS UPPER BI                          PROCEDURE DATE:  04/16/2022          INTERPRETATION:  CLINICAL INFORMATION: Vein mapping. Pre-op for AVF   creation.    COMPARISON: Left upper extremity venous duplex ultrasound 2/8/2017.    TECHNIQUE: Duplex sonography of the BILATERAL upper extremity veins with   color and spectral Doppler, with and without compression.    FINDINGS:    Bilateral internal jugular, subclavian, axillary, and brachial veins are   patent and compressible where applicable. Doppler evaluation demonstrates   spontaneous flow. Bilateral basilic and cephalic veins are patent.   However, the left cephalic vein is not visualized in the left upper arm.    The diameters of the visualized veinsare tabulated below:    Right Basilic Vein  prox upper arm  0.57 cm  mid   0.41  distal   0.34  ACF 0.36  prox forearm  0.33  mid   0.30  distal   0.29    Right Cephalic Vein  prox upper arm  0.39 cm  mid   0.41  distal   0.44  ACF 0.43  prox forearm 0.39  mid   0.28  distal   0.48    Left Basilic Vein  prox upper arm  0.57 cm  mid   0.56  distal   0.52  ACF 0.35  prox forearm  0.19  mid   0.26  distal   0.25    Left Cephalic Vein  prox upper arm  , not visualized  mid   , not visualized  distal  , not visualized  ACF 0.46 cm  prox forearm  0.39  mid   0.35  distal   0.32    IMPRESSION:    No evidence of deep venous thrombosis in either upper extremity.    Bilateral upper extremity vein mapping as above. Left cephalic vein is   not visualized in the left upper arm.

## 2022-04-17 NOTE — PROGRESS NOTE ADULT - SUBJECTIVE AND OBJECTIVE BOX
CARDIOLOGY FOLLOW UP - Dr. Mazariegos  Date of Service: 4/17/22  CC: no cp/sob     Review of Systems:  Constitutional: No fever, weight loss, or fatigue  Respiratory: No cough, wheezing, or hemoptysis, no shortness of breath  Cardiovascular: No chest pain, palpitations, passing out, dizziness, or leg swelling  Gastrointestinal: No abd or epigastric pain.  No nausea, vomiting, or hematemesis; no diarrhea or constipation, no melena or hematochezia  Vascular: no edema       PHYSICAL EXAM:  T(C): 37 (04-17-22 @ 04:02), Max: 37.4 (04-16-22 @ 15:30)  HR: 87 (04-17-22 @ 04:02) (70 - 93)  BP: 149/63 (04-17-22 @ 06:40) (116/68 - 149/63)  RR: 17 (04-17-22 @ 04:02) (17 - 19)  SpO2: 95% (04-17-22 @ 04:02) (92% - 99%)  Wt(kg): --  I&O's Summary    16 Apr 2022 07:01  -  17 Apr 2022 07:00  --------------------------------------------------------  IN: 0 mL / OUT: 1500 mL / NET: -1500 mL        Appearance: Normal	  Cardiovascular: irreg , No JVD, No murmurs  Respiratory: Lungs clear to auscultation	  Gastrointestinal:  Soft, Non-tender, + BS	  Extremities: Normal range of motion, No clubbing, cyanosis or edema      Home Medications:  allopurinol 100 mg oral tablet: 1 tab(s) orally once a day (03 Apr 2022 06:34)  aspirin 81 mg oral delayed release tablet: 1 tab(s) orally once a day (03 Apr 2022 06:34)  bumetanide 2 mg oral tablet: 1 tab(s) orally once a day (03 Apr 2022 06:34)  insulin glargine 100 units/mL subcutaneous solution: 25 unit(s) subcutaneous once a day (at bedtime) (03 Apr 2022 06:34)  metOLazone 5 mg oral tablet: 1 tab(s) orally 3 times a week (03 Apr 2022 06:34)  metoprolol succinate 50 mg oral tablet, extended release: 2 tab(s) orally once a day (03 Apr 2022 06:34)  NovoLOG 100 units/mL subcutaneous solution: subcutaneous 4 times a day (before meals and at bedtime) (03 Apr 2022 06:34)  NovoLOG FlexPen 100 units/mL injectable solution: 10 unit(s) subcutaneous 3 times a day (03 Apr 2022 06:34)  oxycodone-acetaminophen 5 mg-325 mg oral tablet: 1 tab(s) orally 2 times a day (03 Apr 2022 06:34)  Pradaxa 150 mg oral capsule: 1 cap(s) orally 2 times a day (03 Apr 2022 06:34)  pregabalin 100 mg oral capsule: 1 cap(s) orally 3 times a day (03 Apr 2022 06:34)      MEDICATIONS  (STANDING):  allopurinol 100 milliGRAM(s) Oral daily  aspirin enteric coated 81 milliGRAM(s) Oral daily  atorvastatin 40 milliGRAM(s) Oral at bedtime  chlorhexidine 2% Cloths 1 Application(s) Topical daily  chlorhexidine 4% Liquid 1 Application(s) Topical <User Schedule>  dextrose 5%. 1000 milliLiter(s) (100 mL/Hr) IV Continuous <Continuous>  dextrose 5%. 1000 milliLiter(s) (50 mL/Hr) IV Continuous <Continuous>  dextrose 50% Injectable 25 Gram(s) IV Push once  dextrose 50% Injectable 12.5 Gram(s) IV Push once  dextrose 50% Injectable 25 Gram(s) IV Push once  diltiazem    milliGRAM(s) Oral daily  glucagon  Injectable 1 milliGRAM(s) IntraMuscular once  heparin  Infusion. 3000 Unit(s)/Hr (30 mL/Hr) IV Continuous <Continuous>  hydrALAZINE 25 milliGRAM(s) Oral three times a day  insulin lispro (ADMELOG) corrective regimen sliding scale   SubCutaneous three times a day before meals  insulin lispro (ADMELOG) corrective regimen sliding scale   SubCutaneous at bedtime  lactulose Syrup 20 Gram(s) Oral once  lidocaine   4% Patch 1 Patch Transdermal daily  metoprolol succinate  milliGRAM(s) Oral daily  pregabalin 25 milliGRAM(s) Oral two times a day  saline laxative (FLEET) Rectal Enema 1 Enema Rectal once  senna 2 Tablet(s) Oral at bedtime  sevelamer carbonate 1600 milliGRAM(s) Oral three times a day with meals      TELEMETRY: 	 afib 70-100s    ECG:  	  RADIOLOGY:   DIAGNOSTIC TESTING:  [ ] Echocardiogram:  [ ]  Catheterization:  [ ] Stress Test:    OTHER: 	    LABS:	 	                            9.0    19.34 )-----------( 261      ( 17 Apr 2022 04:55 )             30.2     04-17    131<L>  |  94<L>  |  34<H>  ----------------------------<  207<H>  4.2   |  21<L>  |  4.90<H>    Ca    8.6      17 Apr 2022 04:55  Phos  4.3     04-17  Mg     2.3     04-17      PTT - ( 17 Apr 2022 00:35 )  PTT:36.2 sec

## 2022-04-17 NOTE — PROGRESS NOTE ADULT - ASSESSMENT
·  Problem: Acute kidney injury superimposed on CKD. pt currently on hd  to cont as per renal  perm cath done  avf on monday  medically stable pending clearance from cards/ id   pulm / cards f.u for clearance    Lethargy resolved  - CT head no acute event       Problem/Plan - 2:  ·  Problem: Chronic atrial fibrillation. c/w a/c  cards f/u     Problem/Plan - 3:  ·  Problem: Cystitis.   treatment as per id  s/p abs     Problem/Plan - 4:  ·  Problem: Type 2 diabetes mellitus. c/w insulin   endo f/u     Problem/Plan - 5:  ·  Problem: CAD (coronary artery disease).   stable  cards f/u       s/p shiley removal / groin w/ bleeding resolved    vasc f/u   constipation  bowel regimem  enema  lactulose    gouty pain  ? steroids /   add lidoderm patch to l knee

## 2022-04-17 NOTE — PROGRESS NOTE ADULT - SUBJECTIVE AND OBJECTIVE BOX
[facility] DIVISION of INFECTIOUS DISEASE  Dimitrios Floyd MD PhD, Gela Davalos MD, Annie King MD, Dnaiel Cuevas MD, Eugene Meeks MD  and providing coverage with Donavon Barrera MD  Providing Infectious Disease Consultations at [facility], [facility], [facility]'s    Office# [phone] to schedule follow up appointments  Answering Service for urgent calls or New Consults [phone]  Cell# to text for urgent issues Dimitrios Floyd [phone]     infectious diseases progress note:    DYLAN DEL CASTILLO is a 67y y. o. Male patient    No concerning overnight events    Allergies    nitrofurantoin (Nephrotoxicity)    Intolerances        ANTIBIOTICS/RELEVANT:  antimicrobials    immunologic:    OTHER:  allopurinol 100 milliGRAM(s) Oral daily  aspirin enteric coated 81 milliGRAM(s) Oral daily  atorvastatin 40 milliGRAM(s) Oral at bedtime  bisacodyl 5 milliGRAM(s) Oral every 12 hours PRN  chlorhexidine 2% Cloths 1 Application(s) Topical daily  chlorhexidine 4% Liquid 1 Application(s) Topical <User Schedule>  dextrose 5%. 1000 milliLiter(s) IV Continuous <Continuous>  dextrose 5%. 1000 milliLiter(s) IV Continuous <Continuous>  dextrose 50% Injectable 25 Gram(s) IV Push once  dextrose 50% Injectable 12.5 Gram(s) IV Push once  dextrose 50% Injectable 25 Gram(s) IV Push once  dextrose Oral Gel 15 Gram(s) Oral once PRN  diltiazem    milliGRAM(s) Oral daily  glucagon  Injectable 1 milliGRAM(s) IntraMuscular once  heparin   Injectable 34815 Unit(s) IV Push every 6 hours PRN  heparin   Injectable 5000 Unit(s) IV Push every 6 hours PRN  heparin  Infusion. 3000 Unit(s)/Hr IV Continuous <Continuous>  hydrALAZINE 25 milliGRAM(s) Oral three times a day  insulin lispro (ADMELOG) corrective regimen sliding scale   SubCutaneous three times a day before meals  insulin lispro (ADMELOG) corrective regimen sliding scale   SubCutaneous at bedtime  lidocaine   4% Patch 1 Patch Transdermal daily  metoprolol succinate  milliGRAM(s) Oral daily  pregabalin 25 milliGRAM(s) Oral two times a day  saline laxative (FLEET) Rectal Enema 1 Enema Rectal once  senna 2 Tablet(s) Oral at bedtime  sevelamer carbonate 1600 milliGRAM(s) Oral three times a day with meals  sodium chloride 0.9% lock flush 10 milliLiter(s) IV Push every 1 hour PRN      Objective:  Vital Signs Last 24 Hrs  T(C): 36.9 (17 Apr 2022 13:15), Max: 37.4 (16 Apr 2022 15:30)  T(F): 98.5 (17 Apr 2022 13:15), Max: 99.3 (16 Apr 2022 15:30)  HR: 87 (17 Apr 2022 13:15) (70 - 89)  BP: 146/64 (17 Apr 2022 13:15) (116/68 - 149/63)  BP(mean): --  RR: 18 (17 Apr 2022 13:15) (17 - 18)  SpO2: 93% (17 Apr 2022 13:15) (92% - 99%)    T(C): 36.9 (04-17-22 @ 13:15), Max: 37.4 (04-16-22 @ 15:30)  T(C): 36.9 (04-17-22 @ 13:15), Max: 37.4 (04-16-22 @ 15:30)  T(C): 36.9 (04-17-22 @ 13:15), Max: 37.4 (04-16-22 @ 15:30)        LABS:                          9.0    19.34 )-----------( 261      ( 17 Apr 2022 04:55 )             30.2       19.34 04-17 @ 04:55  19.43 04-17 @ 00:35  16.42 04-16 @ 07:45  14.31 04-15 @ 06:51  13.04 04-14 @ 05:39  13.52 04-13 @ 22:21  12.24 04-13 @ 11:27  11.34 04-12 @ 19:22  15.29 04-12 @ 07:22  17.29 04-11 @ 18:28  14.00 04-11 @ 07:02      04-17    131<L>  |  94<L>  |  34<H>  ----------------------------<  207<H>  4.2   |  21<L>  |  4.90<H>    Ca    8.6      17 Apr 2022 04:55  Phos  4.3     04-17  Mg     2.3     04-17        Creatinine, Serum: 4.90 mg/dL (04-17-22 @ 04:55)  Creatinine, Serum: 5.37 mg/dL (04-16-22 @ 07:43)  Creatinine, Serum: 6.91 mg/dL (04-15-22 @ 06:39)  Creatinine, Serum: 5.38 mg/dL (04-14-22 @ 11:04)  Creatinine, Serum: 6.56 mg/dL (04-12-22 @ 19:22)  Creatinine, Serum: 5.76 mg/dL (04-12-22 @ 07:22)  Creatinine, Serum: 6.42 mg/dL (04-11-22 @ 07:07)      PTT - ( 17 Apr 2022 10:10 )  PTT:67.8 sec          INFLAMMATORY MARKERS                      Ferritin, Serum: 172 ng/mL (04-15-22 @ 09:05)  Ferritin, Serum: 152 ng/mL (04-03-22 @ 23:06)        Activated Partial Thromboplastin Time: 67.8 sec (04-17-22 @ 10:10)  Activated Partial Thromboplastin Time: 36.2 sec (04-17-22 @ 00:35)  Activated Partial Thromboplastin Time: 70.7 sec (04-16-22 @ 15:46)  Activated Partial Thromboplastin Time: 52.9 sec (04-16-22 @ 07:44)  Activated Partial Thromboplastin Time: 70.7 sec (04-15-22 @ 06:36)  INR: 1.25 ratio (04-15-22 @ 06:36)  Activated Partial Thromboplastin Time: 69.5 sec (04-14-22 @ 12:34)  Activated Partial Thromboplastin Time: 75.6 sec (04-14-22 @ 05:39)  Activated Partial Thromboplastin Time: 51.8 sec (04-13-22 @ 22:21)  Activated Partial Thromboplastin Time: 32.3 sec (04-12-22 @ 19:22)  INR: 1.27 ratio (04-12-22 @ 07:24)  Activated Partial Thromboplastin Time: 51.0 sec (04-12-22 @ 01:42)  Activated Partial Thromboplastin Time: 52.8 sec (04-11-22 @ 18:28)  Activated Partial Thromboplastin Time: 71.9 sec (04-11-22 @ 07:02)  Activated Partial Thromboplastin Time: 65.0 sec (04-10-22 @ 07:32)  Activated Partial Thromboplastin Time: 90.8 sec (04-09-22 @ 16:11)  Activated Partial Thromboplastin Time: 94.7 sec (04-09-22 @ 07:24)  Activated Partial Thromboplastin Time: 118.2 sec (04-08-22 @ 23:59)  Activated Partial Thromboplastin Time: 57.2 sec (04-08-22 @ 15:17)  INR: 1.37 ratio (04-08-22 @ 07:12)  Activated Partial Thromboplastin Time: 46.0 sec (04-08-22 @ 07:12)  Activated Partial Thromboplastin Time: 61.7 sec (04-07-22 @ 15:30)  INR: 1.66 ratio (04-07-22 @ 13:08)  Activated Partial Thromboplastin Time: 97.0 sec (04-07-22 @ 13:08)  Activated Partial Thromboplastin Time: 49.0 sec (04-07-22 @ 07:09)  Activated Partial Thromboplastin Time: 65.4 sec (04-06-22 @ 03:27)  Activated Partial Thromboplastin Time: 70.8 sec (04-05-22 @ 21:03)  INR: 1.81 ratio (04-05-22 @ 10:20)  Activated Partial Thromboplastin Time: 133.3 sec (04-05-22 @ 10:20)  INR: 1.89 ratio (04-05-22 @ 00:17)  Activated Partial Thromboplastin Time: 157.8 sec (04-05-22 @ 00:17)  INR: 1.82 ratio (04-04-22 @ 14:07)  Activated Partial Thromboplastin Time: 63.4 sec (04-04-22 @ 14:07)  Activated Partial Thromboplastin Time: 83.1 sec (04-03-22 @ 18:56)          MICROBIOLOGY:              RADIOLOGY & ADDITIONAL STUDIES:

## 2022-04-17 NOTE — CHART NOTE - NSCHARTNOTEFT_GEN_A_CORE
Vascular Surgery Preop Note    Plan to take patient to OR tomorrow (Monday 4/18) for LUE AVF creation, possible graft placement.    - Consent signed and placed in chart  - Cardiology clearance documented  - Medicine clearance documented  - Pulmonary clearance documented  - Vein mapping completed  - Preop labs ordered for 4/18 @ 4:00 AM (CBC, BMP, Coags, T+S)  - NPO @ midnight  - COVID negative (4/15)  - Active T+S x2 (4/17 and 4/12)  - Last HD session on Saturday 4/16      [id number]

## 2022-04-18 NOTE — BRIEF OPERATIVE NOTE - NSICDXBRIEFPOSTOP_GEN_ALL_CORE_FT
POST-OP DIAGNOSIS:  Acute kidney injury superimposed on CKD 18-Apr-2022 19:10:08  Vanita Ellsworth

## 2022-04-18 NOTE — PROGRESS NOTE ADULT - ASSESSMENT
Patient is a 67 year old male with PMH of CAD with past multiple PCI, with most recent discharge from [place] in Feb 2022 for NSTEMI with LULU of an 85% prox LAD lesion with patient on ASA and now off Plavix per cardiology (only for one month), chronic afib on Pradaxa, HTN, type 2 DM on insulin, diabetic neuropathy with chronic RIGHT LE venous stasis changes>left, LEFT foot ulcer seen by podiatry, past endarterectomy LEFT CEA in 2014 who presented with UTI with hematuria diagnosed at urgent care and now with decreased urine output.   Plan to take patient to OR Monday 4/18 for LUE AVF creation, possible graft placement.    Acute cystitis  Leukocytosis  Noted dark urine with hematuria, went to UC and was prescribed macrobid, had some improvement in urine color but then noticed decreased urine output with no voiding reported in last 2 days  UA here with pyuria -  with large LE, negative nitrites and no bacteria. UCx/BCx NGTD  Imaging: CTAP reviewed - no hydronephrosis, nephrolithiasis, or evidence of obstructive uropathy  S/p ceftriaxone x7 day course completed 4/9  C/w monitoring off Abx.   Trend temps/WBC--Elevated WBC noted. No obv localizing concerns for new or recurrent infection. Suspect this is reactive and not due to infection but rather demarginalization.     ANT on CKD3   - Nephrology following, appreciate recs   - s/p placement of shiley and initiation of HD   - s/p permacath placement    - monitor Cr    - renally dose meds    - avoid nephrotoxic agents    B/l LE edema with chronic venous stasis  Acute on chronic HFpEF   - legs cool to touch, nontender, chronic changes with no sign of infection/cellulitis   - has wound on bottom of L foot - dry and does not appear infected either   - clinically overloaded, Cardiology and Nephrology following   - elevate lower extremities      DM2 with neuropathy   - Blood glucose management per primary team.    Ok to proceed to OR from ID standpoint    Infectious Diseases will continue to follow. Please call with any questions.   Annie iKng M.D.  [facility], Division of Infectious Diseases [phone]     Patient is a 67 year old male with PMH of CAD with past multiple PCI, with most recent discharge from [place] in Feb 2022 for NSTEMI with LULU of an 85% prox LAD lesion with patient on ASA and now off Plavix per cardiology (only for one month), chronic afib on Pradaxa, HTN, type 2 DM on insulin, diabetic neuropathy with chronic RIGHT LE venous stasis changes>left, LEFT foot ulcer seen by podiatry, past endarterectomy LEFT CEA in 2014 who presented with UTI with hematuria diagnosed at urgent care and now with decreased urine output.   Plan to take patient to OR Monday 4/18 for LUE AVF creation, possible graft placement.    Acute cystitis  Leukocytosis  Noted dark urine with hematuria, went to UC and was prescribed macrobid, had some improvement in urine color but then noticed decreased urine output with no voiding reported in last 2 days  UA here with pyuria -  with large LE, negative nitrites and no bacteria. UCx/BCx NGTD  Imaging: CTAP reviewed - no hydronephrosis, nephrolithiasis, or evidence of obstructive uropathy  S/p ceftriaxone x7 day course completed 4/9  C/w monitoring off Abx.   Trend temps/WBC--Elevated WBC noted. No obv localizing concerns for new or recurrent infection. Suspect this is reactive and not due to infection but rather demarginalization.     ANT on CKD3   - Nephrology following, appreciate recs   - s/p placement of shiley and initiation of HD   - pending permcath placement   - monitor Cr    - renally dose meds    - avoid nephrotoxic agents    B/l LE edema with chronic venous stasis  Acute on chronic HFpEF   - legs cool to touch, nontender, chronic changes with no sign of infection/cellulitis   - has wound on bottom of L foot - dry and does not appear infected either   - clinically overloaded, Cardiology and Nephrology following   - elevate lower extremities      DM2 with neuropathy   - Blood glucose management per primary team.    Ok to proceed to OR from ID standpoint  Ok to proceed w/ permacath placement via IR     D/w NP Thuan    Infectious Diseases will continue to follow. Please call with any questions.   Annie King M.D.  [facility], Division of Infectious Diseases [phone]

## 2022-04-18 NOTE — PROGRESS NOTE ADULT - SUBJECTIVE AND OBJECTIVE BOX
Follow-up Pulm Progress Note    No new respiratory events overnight.  Denies SOB/CP.   Sats 95% RA    Medications:  MEDICATIONS  (STANDING):  allopurinol 100 milliGRAM(s) Oral daily  aspirin enteric coated 81 milliGRAM(s) Oral daily  atorvastatin 40 milliGRAM(s) Oral at bedtime  chlorhexidine 2% Cloths 1 Application(s) Topical daily  chlorhexidine 4% Liquid 1 Application(s) Topical <User Schedule>  dextrose 5%. 1000 milliLiter(s) (100 mL/Hr) IV Continuous <Continuous>  dextrose 5%. 1000 milliLiter(s) (50 mL/Hr) IV Continuous <Continuous>  dextrose 50% Injectable 25 Gram(s) IV Push once  dextrose 50% Injectable 12.5 Gram(s) IV Push once  dextrose 50% Injectable 25 Gram(s) IV Push once  diltiazem    milliGRAM(s) Oral daily  glucagon  Injectable 1 milliGRAM(s) IntraMuscular once  heparin  Infusion. 3000 Unit(s)/Hr (30 mL/Hr) IV Continuous <Continuous>  hydrALAZINE 25 milliGRAM(s) Oral three times a day  insulin lispro (ADMELOG) corrective regimen sliding scale   SubCutaneous three times a day before meals  insulin lispro (ADMELOG) corrective regimen sliding scale   SubCutaneous at bedtime  lidocaine   4% Patch 1 Patch Transdermal daily  lidocaine   4% Patch 1 Patch Transdermal daily  metoprolol succinate  milliGRAM(s) Oral daily  pregabalin 25 milliGRAM(s) Oral two times a day  senna 2 Tablet(s) Oral at bedtime  sevelamer carbonate 1600 milliGRAM(s) Oral three times a day with meals    MEDICATIONS  (PRN):  bisacodyl 5 milliGRAM(s) Oral every 12 hours PRN Constipation  dextrose Oral Gel 15 Gram(s) Oral once PRN Blood Glucose LESS THAN 70 milliGRAM(s)/deciliter  heparin   Injectable 11407 Unit(s) IV Push every 6 hours PRN For aPTT less than 40  heparin   Injectable 5000 Unit(s) IV Push every 6 hours PRN For aPTT between 40 - 57  sodium chloride 0.9% lock flush 10 milliLiter(s) IV Push every 1 hour PRN Pre/post blood products, medications, blood draw, and to maintain line patency          Vital Signs Last 24 Hrs  T(C): 36.4 (18 Apr 2022 04:18), Max: 36.9 (17 Apr 2022 13:15)  T(F): 97.5 (18 Apr 2022 04:18), Max: 98.5 (17 Apr 2022 13:15)  HR: 94 (18 Apr 2022 04:18) (81 - 100)  BP: 154/69 (18 Apr 2022 04:18) (114/66 - 154/69)  BP(mean): --  RR: 18 (18 Apr 2022 04:18) (18 - 18)  SpO2: 95% (18 Apr 2022 04:18) (93% - 98%)          04-17 @ 07:01  -  04-18 @ 07:00  --------------------------------------------------------  IN: 1140 mL / OUT: 0 mL / NET: 1140 mL          LABS:                        8.2    16.73 )-----------( 268      ( 18 Apr 2022 04:57 )             27.0     04-18    129<L>  |  92<L>  |  44<H>  ----------------------------<  200<H>  4.0   |  22  |  6.75<H>    Ca    8.5      18 Apr 2022 04:57  Phos  5.4     04-18  Mg     2.5     04-18            CAPILLARY BLOOD GLUCOSE      POCT Blood Glucose.: 149 mg/dL (18 Apr 2022 12:27)    PT/INR - ( 18 Apr 2022 04:57 )   PT: 14.7 sec;   INR: 1.26 ratio         PTT - ( 18 Apr 2022 10:39 )  PTT:29.8 sec    Procalcitonin, Serum: 0.80 ng/mL (04-17-22 @ 16:50)      DEREJE Negative 04-11 @ 16:24  Anti SS-1 --  Anti SS-2 --  Anti RNP --  RF -- 04-11 @ 16:24    Atypical ANCA Indeterminate Method interference due to DEREJE fluorescence 04-11 @ 16:24  c-ANCA titer -- 04-11 @ 16:24  c-ANCA Negative 04-11 @ 16:24  p-ANCA Negative 04-11 @ 16:24        Physical Examination:  PULM: Decreased at bases  CVS: S1, S2 heard    RADIOLOGY REVIEWED  CXR 4/11: unchanged b/l effusions, atelectasis    CT chest: < from: CT Chest No Cont (04.04.22 @ 16:52) >  FINDINGS:    AIRWAYS, LUNGS, PLEURA: Tracheal secretions. Small left greater than   right pleural effusions increased compared to 2/19/2022. Right-sided   pleural thickening. Lingular and left greater than right basilar   opacification, likely atelectasis.    MEDIASTINUM: Cardiomegaly. No pericardial effusion. Thoracic aorta normal   caliber.  No large mediastinal lymph nodes.    IMAGED ABDOMEN: Nonspecific perinephric stranding.    SOFT TISSUES: Unremarkable.    BONES: Unremarkable.      IMPRESSION:.    Small left greater than right bilateral pleural effusions increased in   size compared to 2/19/2022.    Lingular and left greater than right basilar opacification, likely   atelectasis.    --- End of Report ---    < end of copied text >

## 2022-04-18 NOTE — PROGRESS NOTE ADULT - ASSESSMENT
67y old male with ESRD on hd requiring long term hd access    Recommendations  - Plan to take patient to OR TODAY for LUE AVF creation, possible graft placement  - please hold heparin gtt at [facility]     Vascular Surgery  [phone]

## 2022-04-18 NOTE — PROGRESS NOTE ADULT - ASSESSMENT
A/P    68 y/o M with history of CAD, s/p multiple PCI, with most recent 2/22 PCI of LAD in setting of NSTEMI, on ASA only (pradaxa), chronic atrial fibrillation maintained on Pradaxa, essential HTN, type 2 DM previously on oral medications but on insulin, diabetic neuropathy with chronic RIGHT LE venous stasis changes>left, LEFT foot ulcer seen by podiatry, past endarterectomy LEFT CEA in 2014 presenting with 1 week of decreased urine output, cystitis with hematuria     #ANT on CKD, new HD  -oliguric renal failure in setting of ? UTI/cystitis, r/o obstruction ? secondary to abx   -worsened with diuretic use but unlikely due to hypovolemia alone from diuretic use   -hyperkalemia improved   -New HD for uremia, renal failure- sp RIJ shiley placed on 4/12 in IR.   -renal f/u -sp rrt 4/12 for uncontrolled bleeding sp  right groin shiley removal  - monitor h/h - stable  -IR following planning tunnelled HD catheter placement. today - pt can proceed from a cv standpoint   -vasc plans for avf with possible graft today  - can proceed from a cv standpoint     #AMS/Lethargy  -sig improved post hd   -likely 2/2 Uremic encephalopathy   -ABG noted  -med f/u   -MICU eval noted 4/5  -head ct ok    #Acute on chronic HFpEF  -remains overloaded but improved  -continue aggressive volume removal with HD  -now tolerating po - c/w  bb    #Chronic AF  -rates improved sp cardizem gtt    -c/w metoprolol succ 100 mg qd  -c/w dilt cd 120mg daily    -c.w hep gtt     #HTN  -tolerating PO--- c/w meds   -increase hydral if bp remains elevated     #CAD, s/p PCI, most recent 2/2022  -stable, cont ASA, off plavix due to a/c indication  -statin     #R carotid stenosis  -cont med tx  -MRA noted with Greater than 75% stenosis of the right distal common carotid artery. Approximately 60% stenosis of the proximal left internal carotid artery.  -Continue ASA and Statin Therapy  -vasc outpt f/u Dr Pinto    ACP- Advanced Care Planning  -Advanced care planning discussed with patient. Advanced care planning forms discussed with patient and/or family.  Risks, benefits, and alternatives of medical/cardiac procedures were discussed in detail with all questions answered.  30 minutes were spent addressing advance care planning.

## 2022-04-18 NOTE — PROGRESS NOTE ADULT - SUBJECTIVE AND OBJECTIVE BOX
Admitting Diagnosis:  Chronic kidney disease [N18.9]  CHRONIC KIDNEY DISEASE, UNSPECIFIED    Background:  This is a 67 year old gentleman pmhx CAD s/p MI/PCI/CABG, hx TIA, afib on rivaroxaban, HTN, DM2, PVD, gout, L CEA 2014, and CKD who presented to [facility] 4/11/22 with ANT, tremors, confusion and lethargy.  CT head no acute changes.  S/p initiation of hemodialysis.  Mental status has improved dramatically.  Pt denies any headaches, no slurred speech, no hemiparesis.  He has chronic gout with bilateral finger swelling, pain.  He also has chronic neuropathy.  Both legs are weak.      Interval Hx:  mental status continues to improve.  Legs still quite weak but improved from yesterday.  He is still very deconditioned and has neuropathic pain in his legs.    Restarted pregabalin    for or with vacular today  ******    Past Medical History:  HTN (hypertension), benign [401.1]  HLD (hyperlipidemia) [272.4]  DM type 2 (diabetes mellitus, type 2) [250.00]  TIA (transient ischemic attack) [435.9]  Atrial fibrillation [427.31]  MI (myocardial infarction) [410.90]  circa 2014 and 2022.  CAD (coronary artery disease) [414.00]  Neuropathy [G62.9]  Stage 3 chronic kidney disease [N18.30]  2019 novel coronavirus disease (COVID-19) [U07.1]  12/2021.  Received the COVID-19 vaccine x 2 as of April 2022.    Past Surgical History:  Status post angioplasty with stent [V45.82]  LULU x 3 2/7/2014  S/P carotid endarterectomy [Z98.89]  left  S/P CABG (coronary artery bypass graft) [Z95.1]        Social History:  No toxic habits    Family History:  FAMILY HISTORY:  Family history of heart disease  father    Family history of CVA  mother    Allergies:  nitrofurantoin (Nephrotoxicity)    ROS: as per HPI.      Medications:  allopurinol 100 milliGRAM(s) Oral daily  aspirin enteric coated 81 milliGRAM(s) Oral daily  atorvastatin 40 milliGRAM(s) Oral at bedtime  bisacodyl 5 milliGRAM(s) Oral every 12 hours PRN  chlorhexidine 2% Cloths 1 Application(s) Topical daily  chlorhexidine 4% Liquid 1 Application(s) Topical <User Schedule>  dextrose 5%. 1000 milliLiter(s) IV Continuous <Continuous>  dextrose 5%. 1000 milliLiter(s) IV Continuous <Continuous>  dextrose 50% Injectable 25 Gram(s) IV Push once  dextrose 50% Injectable 12.5 Gram(s) IV Push once  dextrose 50% Injectable 25 Gram(s) IV Push once  dextrose Oral Gel 15 Gram(s) Oral once PRN  diltiazem    milliGRAM(s) Oral daily  glucagon  Injectable 1 milliGRAM(s) IntraMuscular once  heparin   Injectable 97051 Unit(s) IV Push every 6 hours PRN  heparin   Injectable 5000 Unit(s) IV Push every 6 hours PRN  heparin  Infusion. 3000 Unit(s)/Hr IV Continuous <Continuous>  hydrALAZINE 25 milliGRAM(s) Oral three times a day  insulin lispro (ADMELOG) corrective regimen sliding scale   SubCutaneous three times a day before meals  insulin lispro (ADMELOG) corrective regimen sliding scale   SubCutaneous at bedtime  lidocaine   4% Patch 1 Patch Transdermal daily  lidocaine   4% Patch 1 Patch Transdermal daily  metoprolol succinate  milliGRAM(s) Oral daily  pregabalin 25 milliGRAM(s) Oral two times a day  senna 2 Tablet(s) Oral at bedtime  sevelamer carbonate 1600 milliGRAM(s) Oral three times a day with meals  sodium chloride 0.9% lock flush 10 milliLiter(s) IV Push every 1 hour PRN      Labs:  CBC Full  -  ( 18 Apr 2022 04:57 )  WBC Count : 16.73 K/uL  RBC Count : 3.28 M/uL  Hemoglobin : 8.2 g/dL  Hematocrit : 27.0 %  Platelet Count - Automated : 268 K/uL  Mean Cell Volume : 82.3 fl  Mean Cell Hemoglobin : 25.0 pg  Mean Cell Hemoglobin Concentration : 30.4 gm/dL  Auto Neutrophil # : x  Auto Lymphocyte # : x  Auto Monocyte # : x  Auto Eosinophil # : x  Auto Basophil # : x  Auto Neutrophil % : x  Auto Lymphocyte % : x  Auto Monocyte % : x  Auto Eosinophil % : x  Auto Basophil % : x    04-18    129<L>  |  92<L>  |  44<H>  ----------------------------<  200<H>  4.0   |  22  |  6.75<H>    Ca    8.5      18 Apr 2022 04:57  Phos  5.4     04-18  Mg     2.5     04-18      CAPILLARY BLOOD GLUCOSE      POCT Blood Glucose.: 180 mg/dL (18 Apr 2022 08:43)  POCT Blood Glucose.: 245 mg/dL (17 Apr 2022 21:48)  POCT Blood Glucose.: 240 mg/dL (17 Apr 2022 17:18)  POCT Blood Glucose.: 242 mg/dL (17 Apr 2022 11:52)      PT/INR - ( 18 Apr 2022 04:57 )   PT: 14.7 sec;   INR: 1.26 ratio         PTT - ( 18 Apr 2022 04:57 )  PTT:49.1 sec        Vitals:  Vital Signs Last 24 Hrs  T(C): 36.4 (18 Apr 2022 04:18), Max: 36.9 (17 Apr 2022 13:15)  T(F): 97.5 (18 Apr 2022 04:18), Max: 98.5 (17 Apr 2022 13:15)  HR: 94 (18 Apr 2022 04:18) (81 - 100)  BP: 154/69 (18 Apr 2022 04:18) (114/66 - 154/69)  BP(mean): --  RR: 18 (18 Apr 2022 04:18) (18 - 18)  SpO2: 95% (18 Apr 2022 04:18) (93% - 98%)    NEUROLOGICAL EXAM:    Mental status: Awake, alert, and in no apparent distress. Oriented to person, place, and time.  Language intact.  Attention grossly intact.  No neglect    Cranial Nerves: Pupils were equal, round, reactive to light. Extraocular movements were intact. B BTT Facial sensation was intact to light touch. There was no facial asymmetry. The palate was upgoing symmetrically and tongue was midline. Hearing acuity was intact to finger rub AU. Shoulder shrug was full bilaterally    Motor exam: Bulk and tone were normal. No downward drift in arms.  both legs minimally antigravity though in part limited by edema, PVD, and neuropathy.  Fine finger movements were symmetric. There was bilateral symmetric pronator drift    Reflexes: DTRs depressed, flexor plantars.     Sensation: Intact to light touch, temperature.    Coordination: no gross dysmetria    Gait: deferred    Imaging:    ACC: [id number] EXAM:  CT BRAIN                        PROCEDURE DATE:  04/09/2022      INTERPRETATION:  CLINICAL INFORMATION:  Altered mental status, on   anticoagulation .    TECHNIQUE: Multiple contiguous axial images were acquired from the   skullbase to the vertex without the administration of intravenous   contrast.  Coronal and sagittal reformations were made.    COMPARISON: CT head 10/21/2021, brain MRI 02/23/2014.    FINDINGS:    Scattered lacunar infarcts in the bilateral cerebralhemispheres are   grossly stable compared to the 10/21/2021 head CT. No new additional   infarcts are noted over the time interval.    No acute intracranial hemorrhage, mass effect, or midline shift. The   brain demonstrates periventricular hypoattenuation, nonspecific in   etiology but likely representing chronic microvascular ischemic changes.    No abnormal extra-axial fluid collections are present.    The ventricles and sulci demonstrate age appropriate involutional   changes.  The basal cisterns are patent.    The orbits are unremarkable. The paranasal sinuses are clear. The mastoid   air cells are clear. The calvarium is intact.    IMPRESSION:    No acute intracranial abnormality is noted. If the patient has new and   persistent symptoms, consider short interval follow-up head CT or brain   MRI.    --- End of Report ---    GATITO VILLARREAL MD; Resident Radiologist  This document has been electronically signed.  JESSE CORONA MD; Attending Radiologist  This document has been electronically signed. Apr 9 2022  2:00PM

## 2022-04-18 NOTE — PROGRESS NOTE ADULT - SUBJECTIVE AND OBJECTIVE BOX
DATE OF SERVICE: 04-18-22 @ 13:10  CHIEF COMPLAINT:Patient is a 67y old  Male who presents with a chief complaint of Decreased urine output for the past week, leg oedema (18 Apr 2022 12:49)    	        PAST MEDICAL & SURGICAL HISTORY:  HTN (hypertension), benign    HLD (hyperlipidemia)    DM type 2 (diabetes mellitus, type 2)    TIA (transient ischemic attack)    Atrial fibrillation    MI (myocardial infarction)  circa 2014 and 2022.    CAD (coronary artery disease)    Neuropathy    Stage 3 chronic kidney disease    2019 novel coronavirus disease (COVID-19)  12/2021.  Received the COVID-19 vaccine x 2 as of April 2022.    Status post angioplasty with stent  LULU x 3 2/7/2014    S/P carotid endarterectomy  left            REVIEW OF SYSTEMS:  feels better overall  RESPIRATORY: No cough, wheezing, chills or hemoptysis; No Shortness of Breath  CARDIOVASCULAR: No chest pain, palpitations, passing out, dizziness, or leg swelling  GASTROINTESTINAL: No abdominal or epigastric pain. No nausea, vomiting, or hematemesis;  +constipation  GENITOURINARY: No dysuria, frequency, hematuria, or incontinence  NEUROLOGICAL: No headaches, memory loss, loss of strength, numbness, or tremors  knee pain better      Medications:  MEDICATIONS  (STANDING):  allopurinol 100 milliGRAM(s) Oral daily  aspirin enteric coated 81 milliGRAM(s) Oral daily  atorvastatin 40 milliGRAM(s) Oral at bedtime  chlorhexidine 2% Cloths 1 Application(s) Topical daily  chlorhexidine 4% Liquid 1 Application(s) Topical <User Schedule>  dextrose 5%. 1000 milliLiter(s) (100 mL/Hr) IV Continuous <Continuous>  dextrose 5%. 1000 milliLiter(s) (50 mL/Hr) IV Continuous <Continuous>  dextrose 50% Injectable 25 Gram(s) IV Push once  dextrose 50% Injectable 12.5 Gram(s) IV Push once  dextrose 50% Injectable 25 Gram(s) IV Push once  diltiazem    milliGRAM(s) Oral daily  glucagon  Injectable 1 milliGRAM(s) IntraMuscular once  heparin  Infusion. 3000 Unit(s)/Hr (30 mL/Hr) IV Continuous <Continuous>  hydrALAZINE 25 milliGRAM(s) Oral three times a day  insulin lispro (ADMELOG) corrective regimen sliding scale   SubCutaneous three times a day before meals  insulin lispro (ADMELOG) corrective regimen sliding scale   SubCutaneous at bedtime  lidocaine   4% Patch 1 Patch Transdermal daily  lidocaine   4% Patch 1 Patch Transdermal daily  metoprolol succinate  milliGRAM(s) Oral daily  polyethylene glycol 3350 17 Gram(s) Oral two times a day  pregabalin 25 milliGRAM(s) Oral two times a day  senna 2 Tablet(s) Oral at bedtime  sevelamer carbonate 1600 milliGRAM(s) Oral three times a day with meals    MEDICATIONS  (PRN):  bisacodyl 5 milliGRAM(s) Oral every 12 hours PRN Constipation  dextrose Oral Gel 15 Gram(s) Oral once PRN Blood Glucose LESS THAN 70 milliGRAM(s)/deciliter  heparin   Injectable 07195 Unit(s) IV Push every 6 hours PRN For aPTT less than 40  heparin   Injectable 5000 Unit(s) IV Push every 6 hours PRN For aPTT between 40 - 57  sodium chloride 0.9% lock flush 10 milliLiter(s) IV Push every 1 hour PRN Pre/post blood products, medications, blood draw, and to maintain line patency    	    PHYSICAL EXAM:  T(C): 36.4 (04-18-22 @ 04:18), Max: 36.9 (04-17-22 @ 13:15)  HR: 94 (04-18-22 @ 04:18) (81 - 100)  BP: 154/69 (04-18-22 @ 04:18) (114/66 - 154/69)  RR: 18 (04-18-22 @ 04:18) (18 - 18)  SpO2: 95% (04-18-22 @ 04:18) (93% - 98%)  Wt(kg): --  I&O's Summary    17 Apr 2022 07:01  -  18 Apr 2022 07:00  --------------------------------------------------------  IN: 1140 mL / OUT: 0 mL / NET: 1140 mL        Appearance: Normal	  HEENT:   Normal oral mucosa, PERRL, EOMI	    Cardiovascular: reg irr  Respiratory: Lungs clear to auscultation	  Psychiatry: A & O   Gastrointestinal:  Soft, Non-tender, + BS	    Neurologic: Non-focal  Extremities: pvd  dec rom    TELEMETRY: 	    ECG:  	  RADIOLOGY:  OTHER: 	  	  LABS:	 	    CARDIAC MARKERS:                                8.2    16.73 )-----------( 268      ( 18 Apr 2022 04:57 )             27.0     04-18    129<L>  |  92<L>  |  44<H>  ----------------------------<  200<H>  4.0   |  22  |  6.75<H>    Ca    8.5      18 Apr 2022 04:57  Phos  5.4     04-18  Mg     2.5     04-18      proBNP:   Lipid Profile:   HgA1c:   TSH:

## 2022-04-18 NOTE — CHART NOTE - NSCHARTNOTEFT_GEN_A_CORE
GENERAL SURGERY POST-OPERATIVE NOTE    DYLAN DEL CASTILLO | [id number] | [facility] PICU 301 A1    Procedure: s/p Attempted L AVF converted to L AV graft    SUBJECTIVE: Patient seen after procedure. Patient tolerated procedure well. Endorses pain in LUE. Denies numbness, paresthesias, weakness.      SOB:  [ ] YES [ x] NO  Chest Discomfort: [ ] YES [x ] NO    Nausea: [ ] YES [x ] NO           Vomiting: [ ] YES [x ] NO  Diarrhea: [ ] YES [x ] NO         Void: [ ]YES [x ]No           Pain Control Adequate: [x ] YES [ ] NO    PAST MEDICAL & SURGICAL HISTORY:  HTN (hypertension), benign    HLD (hyperlipidemia)    DM type 2 (diabetes mellitus, type 2)    TIA (transient ischemic attack)    Atrial fibrillation    MI (myocardial infarction)  circa 2014 and 2022.    CAD (coronary artery disease)    Neuropathy    Stage 3 chronic kidney disease    2019 novel coronavirus disease (COVID-19)  12/2021.  Received the COVID-19 vaccine x 2 as of April 2022.    Status post angioplasty with stent  LULU x 3 2/7/2014    S/P carotid endarterectomy  left        PHYSICAL EXAM:  Gen: Pt clearly uncomfortable  Resp: breathing easily on room air  CV: RRR  Abdomen: soft, nontender, nondistended  Vasc: LUE in ACE wrap; sensation intact in hand, strength 5/5, radial pulse 2+ palpable  Skin: Normal color, no rashes or cyanosis    Vital Signs Last 24 Hrs  T(C): 36.3 (19 Apr 2022 00:31), Max: 36.8 (18 Apr 2022 13:16)  T(F): 97.4 (19 Apr 2022 00:31), Max: 98.2 (18 Apr 2022 13:16)  HR: 108 (19 Apr 2022 00:31) (86 - 108)  BP: 151/74 (19 Apr 2022 00:31) (115/59 - 154/69)  BP(mean): 96 (18 Apr 2022 19:30) (80 - 96)  RR: 18 (19 Apr 2022 00:31) (14 - 18)  SpO2: 93% (19 Apr 2022 00:31) (93% - 100%)  I&O's Summary    17 Apr 2022 07:01  -  18 Apr 2022 07:00  --------------------------------------------------------  IN: 1140 mL / OUT: 0 mL / NET: 1140 mL    18 Apr 2022 07:01  -  19 Apr 2022 01:12  --------------------------------------------------------  IN: 0 mL / OUT: 0 mL / NET: 0 mL      I&O's Detail    17 Apr 2022 07:01  -  18 Apr 2022 07:00  --------------------------------------------------------  IN:    Heparin Infusion: 420 mL    Oral Fluid: 720 mL  Total IN: 1140 mL    OUT:    Voided (mL): 0 mL  Total OUT: 0 mL    Total NET: 1140 mL      18 Apr 2022 07:01  -  19 Apr 2022 01:12  --------------------------------------------------------  IN:  Total IN: 0 mL    OUT:    Oral Fluid: 0 mL  Total OUT: 0 mL    Total NET: 0 mL                              8.2    16.73 )-----------( 268      ( 18 Apr 2022 04:57 )             27.0     04-18    129<L>  |  92<L>  |  44<H>  ----------------------------<  200<H>  4.0   |  22  |  6.75<H>    Ca    8.5      18 Apr 2022 04:57  Phos  5.4     04-18  Mg     2.5     04-18     PT/INR - ( 18 Apr 2022 04:57 )   PT: 14.7 sec;   INR: 1.26 ratio         PTT - ( 18 Apr 2022 19:33 )  PTT:29.5 sec    Assessment:  The patient is a 67y M who is now several hours post-op from a L AVG. Recovering.     Plan:  - Pain control as needed, gave Dilaudid 1mg x1  - Will speak to primary team about pain management  - Diet: per primary  - Hep GTT in AM  - Encourage OOB and ambulating as tolerated  - F/U Labs  - IS    ALEKSANDRA Lyn, PGY-1  Vascular Surgery  [id number]

## 2022-04-18 NOTE — PROGRESS NOTE ADULT - NS ATTEND AMEND GEN_ALL_CORE FT
pt with no uo has low C3 level etiology suspected ATN but this should not cause low C3.   d./w pt and family re renal biopsy as well. will coordinate and decide with them again in am   avf/permcath

## 2022-04-18 NOTE — PROGRESS NOTE ADULT - ASSESSMENT
Impression:  67 year old gentleman pmhx CAD s/p MI/PCI/CABG, hx TIA, afib on pradaxa, HTN, DM2, PVD, gout, L CEA 2014, CKD admitted to ED with ANT, tremors, confusion and lethargy. Receiving new hemodialysis. CT head no acute changes. Mental status improved today. Pt denies any headaches, no slurred speech, no focal facial or limb weakness. Has chronic gout with bilateral finger swelling, pain.     Diagnosis:  Presentation consistent with toxic metabolic encephalopathy secondary to ANT/CKD improving with dialysis, CT head no acute changes, no focal findings on exam, no evidence of acute neurological event at this time.     Recommendations:  Continue HD as per renal  Continue supportive care per primary team  OOB / Physical therapy  Continue stroke prophylaxis- on AC for afib, antiplatelet, statin, optimize hypertension/glycemic control    mental status has remained at baseline, family concurs at baseline  no further neuro inpt workup is needed  to or with vascular today  reconsult prn  d/w pt and rody at bedside

## 2022-04-18 NOTE — PROGRESS NOTE ADULT - SUBJECTIVE AND OBJECTIVE BOX
[facility]   [phone]  RENAL FOLLOW UP NOTE  --------------------------------------------------------------------------------  Chief Complaint:    24 hour events/subjective:    Patient seen and examined with Dr. Art duncan cp  c/o right hand pain    PAST HISTORY  --------------------------------------------------------------------------------  No significant changes to PMH, PSH, FHx, SHx, unless otherwise noted    ALLERGIES & MEDICATIONS  --------------------------------------------------------------------------------  Allergies    nitrofurantoin (Nephrotoxicity)    Intolerances      Standing Inpatient Medications  allopurinol 100 milliGRAM(s) Oral daily  aspirin enteric coated 81 milliGRAM(s) Oral daily  atorvastatin 40 milliGRAM(s) Oral at bedtime  chlorhexidine 2% Cloths 1 Application(s) Topical daily  chlorhexidine 4% Liquid 1 Application(s) Topical <User Schedule>  dextrose 5%. 1000 milliLiter(s) IV Continuous <Continuous>  dextrose 5%. 1000 milliLiter(s) IV Continuous <Continuous>  dextrose 50% Injectable 25 Gram(s) IV Push once  dextrose 50% Injectable 12.5 Gram(s) IV Push once  dextrose 50% Injectable 25 Gram(s) IV Push once  diltiazem    milliGRAM(s) Oral daily  glucagon  Injectable 1 milliGRAM(s) IntraMuscular once  heparin  Infusion. 3000 Unit(s)/Hr IV Continuous <Continuous>  hydrALAZINE 25 milliGRAM(s) Oral three times a day  insulin lispro (ADMELOG) corrective regimen sliding scale   SubCutaneous three times a day before meals  insulin lispro (ADMELOG) corrective regimen sliding scale   SubCutaneous at bedtime  lidocaine   4% Patch 1 Patch Transdermal daily  lidocaine   4% Patch 1 Patch Transdermal daily  metoprolol succinate  milliGRAM(s) Oral daily  pregabalin 25 milliGRAM(s) Oral two times a day  senna 2 Tablet(s) Oral at bedtime  sevelamer carbonate 1600 milliGRAM(s) Oral three times a day with meals    PRN Inpatient Medications  bisacodyl 5 milliGRAM(s) Oral every 12 hours PRN  dextrose Oral Gel 15 Gram(s) Oral once PRN  heparin   Injectable 92105 Unit(s) IV Push every 6 hours PRN  heparin   Injectable 5000 Unit(s) IV Push every 6 hours PRN  sodium chloride 0.9% lock flush 10 milliLiter(s) IV Push every 1 hour PRN      REVIEW OF SYSTEMS  --------------------------------------------------------------------------------    Gen: denies fevers/chills,  CVS: denies chest pain/palpitations  Resp: denies SOB/Cough  GI: Denies N/V/Abd pain  : Denies dysuria    VITALS/PHYSICAL EXAM  --------------------------------------------------------------------------------  T(C): 36.4 (04-18-22 @ 04:18), Max: 36.9 (04-17-22 @ 13:15)  HR: 94 (04-18-22 @ 04:18) (81 - 100)  BP: 154/69 (04-18-22 @ 04:18) (114/66 - 154/69)  RR: 18 (04-18-22 @ 04:18) (18 - 18)  SpO2: 95% (04-18-22 @ 04:18) (93% - 98%)  Wt(kg): --  Height (cm): 180.3 (04-18-22 @ 12:56)  Weight (kg): 137 (04-18-22 @ 12:56)  BMI (kg/m2): 42.1 (04-18-22 @ 12:56)  BSA (m2): 2.51 (04-18-22 @ 12:56)      04-17-22 @ 07:01  -  04-18-22 @ 07:00  --------------------------------------------------------  IN: 1140 mL / OUT: 0 mL / NET: 1140 mL      Physical Exam:  	              Gen: Appears comfortable, on RA  	Pulm: Decreased breath sounds b/l bases.  	CV: No JVD. IRR,    	Abd: +BS, soft, nontender, softly distended, obese   	: No suprapubic tenderness.               Extremity UE: increased warmth, R hand, increased warmth, tender, swollen              Extremity LE: + hyperpigmented bilateral LE with B/L non-pitting edema- improved, B/L LE tender on palpation              Vascular: R IJ HD catheter    LABS/STUDIES  --------------------------------------------------------------------------------              8.2    16.73 >-----------<  268      [04-18-22 @ 04:57]              27.0     129  |  92  |  44  ----------------------------<  200      [04-18-22 @ 04:57]  4.0   |  22  |  6.75        Ca     8.5     [04-18-22 @ 04:57]      Mg     2.5     [04-18-22 @ 04:57]      Phos  5.4     [04-18-22 @ 04:57]      PT/INR: PT 14.7 , INR 1.26       [04-18-22 @ 04:57]  PTT: 29.8       [04-18-22 @ 10:39]      Creatinine Trend:  SCr 6.75 [04-18 @ 04:57]  SCr 4.90 [04-17 @ 04:55]  SCr 5.37 [04-16 @ 07:43]  SCr 6.91 [04-15 @ 06:39]  SCr 5.38 [04-14 @ 11:04]              Urinalysis - [04-03-22 @ 00:48]      Color Light Orange / Appearance Turbid / SG 1.021 / pH 5.5      Gluc Negative / Ketone Negative  / Bili Negative / Urobili Negative       Blood Large / Protein 300 mg/dL / Leuk Est Large / Nitrite Negative      RBC 20 /  / Hyaline 10 / Gran  / Sq Epi  / Non Sq Epi 3 / Bacteria Negative      Iron 16, TIBC 239, %sat 7      [04-15-22 @ 10:16]  Ferritin 172      [04-15-22 @ 09:05]  PTH -- (Ca 8.3)      [04-15-22 @ 12:18]   150  PTH -- (Ca --)      [04-03-22 @ 23:06]   97  HbA1c 11.7      [11-07-19 @ 09:14]  TSH 1.98      [04-05-22 @ 05:19]    DEREJE: titer Negative, pattern Negative      [04-11-22 @ 16:24]  C3 Complement 61      [04-11-22 @ 16:56]  C4 Complement 28      [04-11-22 @ 16:56]  ANCA: cANCA Negative, pANCA Negative, atypical ANCA Indeterminate Method interference due to DEREJE fluorescence      [04-11-22 @ 16:24]  anti-GBM 3      [04-11-22 @ 19:27]  Free Light Chains: kappa 16.92, lambda 12.63, ratio = 1.34      [04-11 @ 16:56]  Immunofixation Serum: No Monoclonal Band Identified    Reference Range: None Detected      [04-11-22 @ 16:56]  SPEP Interpretation: Normal Electrophoresis Pattern      [04-11-22 @ 16:56]

## 2022-04-18 NOTE — PROGRESS NOTE ADULT - ASSESSMENT
67 year old gentleman with PMH of CAD, NSTEMI s/p 3 stents in 2014 (stents to LAD, proximal and distal LCx), ischemic cardiomyopathy, HTN for 10 years, T2DM for 26 years c/b peripheral neuropathy, CVA, TIA, Afib on Pradaxa, chronic RLE wound, L carotid stenosis s/p endarterectomy (2014) just recently admitted  to the [facility] on 2/19/2022 with acute onset chest pain for one day found to have an NSTEMI, CAD, s/p PCI in setting of increased AF rates off bb for 3 days and resolved chest pain, his CKMB peaked and Echo noted with EF 60%,normal LV function, mild TR, mild MD. He was s/p [facility] with 85% proximal LAD s/p balloon angioplasty and LULU. He presented to [facility] ED with decreased urine output over the week. Mr. Stevens went to [facility] for hematuria and was started  on Nitrofurantoin. Pt is still taking Nitrofurantoin w/ 5 days left. He came to the ED due to worsening symptoms. Pt reports having a similar incident 4-5 years ago that resolved spontaneously. He reports increased leg edema Dyspnea worse on exertion. his baseline Serum creatinine is in the 2.0 to 2.3 mg/dl range. ANT with serum creatinine of 8.29 with bun 144 and k 5.5        1- ANT on ckd III   2- chf chronic   3- hyperkalemia  on hd   4- uremia improving   5- hyperphosphatemia         ANT likely due to ATN however etiology of his ATN unclear ?due to infection recently vs other etiologies   c4/yari anca anti gbm spep/ipep bence pinto normal range  c3 is low etiology unclear, suspected in setting of infection.   still remains anuric  renvela 2 tab with meals   epogen 87195 Units TIW   OR for AVF placement today.  IR for permcath this week  add uric acid level to blood work - result pending  strict I/O  trend creatinine and electrolytes daily

## 2022-04-18 NOTE — PROGRESS NOTE ADULT - SUBJECTIVE AND OBJECTIVE BOX
[facility], Division of Infectious Diseases  ADRIANA Seals Y. Patel, S. Shah, G[facility]  [phone]    Name: DYLAN DEL CASTILLO  Age: 67y  Gender: Male  MRN: [id number]    Interval History:  Patient seen at bedside this morning  No acute overnight events. Afebrile  Daughter at bedside  Notes reviewed    Antibiotics:      Medications:  allopurinol 100 milliGRAM(s) Oral daily  aspirin enteric coated 81 milliGRAM(s) Oral daily  atorvastatin 40 milliGRAM(s) Oral at bedtime  bisacodyl 5 milliGRAM(s) Oral every 12 hours PRN  chlorhexidine 2% Cloths 1 Application(s) Topical daily  chlorhexidine 4% Liquid 1 Application(s) Topical <User Schedule>  dextrose 5%. 1000 milliLiter(s) IV Continuous <Continuous>  dextrose 5%. 1000 milliLiter(s) IV Continuous <Continuous>  dextrose 50% Injectable 25 Gram(s) IV Push once  dextrose 50% Injectable 12.5 Gram(s) IV Push once  dextrose 50% Injectable 25 Gram(s) IV Push once  dextrose Oral Gel 15 Gram(s) Oral once PRN  diltiazem    milliGRAM(s) Oral daily  glucagon  Injectable 1 milliGRAM(s) IntraMuscular once  heparin   Injectable 98447 Unit(s) IV Push every 6 hours PRN  heparin   Injectable 5000 Unit(s) IV Push every 6 hours PRN  heparin  Infusion. 3000 Unit(s)/Hr IV Continuous <Continuous>  hydrALAZINE 25 milliGRAM(s) Oral three times a day  insulin lispro (ADMELOG) corrective regimen sliding scale   SubCutaneous three times a day before meals  insulin lispro (ADMELOG) corrective regimen sliding scale   SubCutaneous at bedtime  lidocaine   4% Patch 1 Patch Transdermal daily  lidocaine   4% Patch 1 Patch Transdermal daily  metoprolol succinate  milliGRAM(s) Oral daily  pregabalin 25 milliGRAM(s) Oral two times a day  senna 2 Tablet(s) Oral at bedtime  sevelamer carbonate 1600 milliGRAM(s) Oral three times a day with meals  sodium chloride 0.9% lock flush 10 milliLiter(s) IV Push every 1 hour PRN      Review of Systems:  Review of systems otherwise negative except as previously noted.    Allergies: nitrofurantoin (Nephrotoxicity)    For details regarding the patient's past medical history, social history, family history, and other miscellaneous elements, please refer the initial infectious diseases consultation and/or the admitting history and physical examination for this admission.    Objective:  Vitals:   T(C): 36.4 (04-18-22 @ 04:18), Max: 36.9 (04-17-22 @ 13:15)  HR: 94 (04-18-22 @ 04:18) (81 - 100)  BP: 154/69 (04-18-22 @ 04:18) (114/66 - 154/69)  RR: 18 (04-18-22 @ 04:18) (18 - 18)  SpO2: 95% (04-18-22 @ 04:18) (93% - 98%)    Physical Examination:  General: no acute distress, on HD   HEENT: NC/AT, anicteric, neck supple  Respiratory: no acc muscle use, breathing comfortably  Cardiovascular: S1 and S2 present  Gastrointestinal: normal appearing, nondistended  Extremities: no edema, no cyanosis  Skin: no visible rash, right permacath      Laboratory Studies:  CBC:                       8.2    16.73 )-----------( 268      ( 18 Apr 2022 04:57 )             27.0     CMP: 04-18    129<L>  |  92<L>  |  44<H>  ----------------------------<  200<H>  4.0   |  22  |  6.75<H>    Ca    8.5      18 Apr 2022 04:57  Phos  5.4     04-18  Mg     2.5     04-18            Microbiology: reviewed    Culture - Blood (collected 04-04-22 @ 17:46)  Source: .Blood Blood-Peripheral  Final Report (04-09-22 @ 18:00):    No Growth Final    Culture - Blood (collected 04-04-22 @ 17:46)  Source: .Blood Blood-Peripheral  Final Report (04-09-22 @ 18:00):    No Growth Final        Radiology: reviewed

## 2022-04-18 NOTE — PROGRESS NOTE ADULT - SUBJECTIVE AND OBJECTIVE BOX
CARDIOLOGY FOLLOW UP - Dr. Mazariegos  Date of Service: 4/18/22  CC: no cp/sob     oob to chair yesterday - no dizziness     Review of Systems:  Constitutional: No fever, weight loss, or fatigue  Respiratory: No cough, wheezing, or hemoptysis, no shortness of breath  Cardiovascular: No chest pain, palpitations, passing out, dizziness, or leg swelling  Gastrointestinal: No abd or epigastric pain.  No nausea, vomiting, or hematemesis; no diarrhea or constipation, no melena or hematochezia  Vascular: no edema       PHYSICAL EXAM:  T(C): 36.4 (04-18-22 @ 04:18), Max: 36.9 (04-17-22 @ 13:15)  HR: 94 (04-18-22 @ 04:18) (81 - 100)  BP: 154/69 (04-18-22 @ 04:18) (114/66 - 154/69)  RR: 18 (04-18-22 @ 04:18) (18 - 18)  SpO2: 95% (04-18-22 @ 04:18) (93% - 98%)  Wt(kg): --  I&O's Summary    17 Apr 2022 07:01  -  18 Apr 2022 07:00  --------------------------------------------------------  IN: 1140 mL / OUT: 0 mL / NET: 1140 mL        Appearance: Normal	  Cardiovascular: Normal S1 S2,RRR, No JVD, No murmurs  Respiratory: Lungs clear to auscultation	  Gastrointestinal:  Soft, Non-tender, + BS	  Extremities: Normal range of motion, No clubbing, cyanosis or edema      Home Medications:  allopurinol 100 mg oral tablet: 1 tab(s) orally once a day (03 Apr 2022 06:34)  aspirin 81 mg oral delayed release tablet: 1 tab(s) orally once a day (03 Apr 2022 06:34)  bumetanide 2 mg oral tablet: 1 tab(s) orally once a day (03 Apr 2022 06:34)  insulin glargine 100 units/mL subcutaneous solution: 25 unit(s) subcutaneous once a day (at bedtime) (03 Apr 2022 06:34)  metOLazone 5 mg oral tablet: 1 tab(s) orally 3 times a week (03 Apr 2022 06:34)  metoprolol succinate 50 mg oral tablet, extended release: 2 tab(s) orally once a day (03 Apr 2022 06:34)  NovoLOG 100 units/mL subcutaneous solution: subcutaneous 4 times a day (before meals and at bedtime) (03 Apr 2022 06:34)  NovoLOG FlexPen 100 units/mL injectable solution: 10 unit(s) subcutaneous 3 times a day (03 Apr 2022 06:34)  oxycodone-acetaminophen 5 mg-325 mg oral tablet: 1 tab(s) orally 2 times a day (03 Apr 2022 06:34)  Pradaxa 150 mg oral capsule: 1 cap(s) orally 2 times a day (03 Apr 2022 06:34)  pregabalin 100 mg oral capsule: 1 cap(s) orally 3 times a day (03 Apr 2022 06:34)      MEDICATIONS  (STANDING):  allopurinol 100 milliGRAM(s) Oral daily  aspirin enteric coated 81 milliGRAM(s) Oral daily  atorvastatin 40 milliGRAM(s) Oral at bedtime  chlorhexidine 2% Cloths 1 Application(s) Topical daily  chlorhexidine 4% Liquid 1 Application(s) Topical <User Schedule>  dextrose 5%. 1000 milliLiter(s) (100 mL/Hr) IV Continuous <Continuous>  dextrose 5%. 1000 milliLiter(s) (50 mL/Hr) IV Continuous <Continuous>  dextrose 50% Injectable 25 Gram(s) IV Push once  dextrose 50% Injectable 12.5 Gram(s) IV Push once  dextrose 50% Injectable 25 Gram(s) IV Push once  diltiazem    milliGRAM(s) Oral daily  glucagon  Injectable 1 milliGRAM(s) IntraMuscular once  heparin  Infusion. 3000 Unit(s)/Hr (30 mL/Hr) IV Continuous <Continuous>  hydrALAZINE 25 milliGRAM(s) Oral three times a day  insulin lispro (ADMELOG) corrective regimen sliding scale   SubCutaneous three times a day before meals  insulin lispro (ADMELOG) corrective regimen sliding scale   SubCutaneous at bedtime  lidocaine   4% Patch 1 Patch Transdermal daily  lidocaine   4% Patch 1 Patch Transdermal daily  metoprolol succinate  milliGRAM(s) Oral daily  pregabalin 25 milliGRAM(s) Oral two times a day  senna 2 Tablet(s) Oral at bedtime  sevelamer carbonate 1600 milliGRAM(s) Oral three times a day with meals      TELEMETRY: 	afib 80-90s     ECG:  	  RADIOLOGY:   DIAGNOSTIC TESTING:  [ ] Echocardiogram:  [ ]  Catheterization:  [ ] Stress Test:    OTHER: 	    LABS:	 	                            8.2    16.73 )-----------( 268      ( 18 Apr 2022 04:57 )             27.0     04-18    129<L>  |  92<L>  |  44<H>  ----------------------------<  200<H>  4.0   |  22  |  6.75<H>    Ca    8.5      18 Apr 2022 04:57  Phos  5.4     04-18  Mg     2.5     04-18      PT/INR - ( 18 Apr 2022 04:57 )   PT: 14.7 sec;   INR: 1.26 ratio         PTT - ( 18 Apr 2022 04:57 )  PTT:49.1 sec

## 2022-04-18 NOTE — PROGRESS NOTE ADULT - SUBJECTIVE AND OBJECTIVE BOX
Surgery Progress Note    SUBJECTIVE: Pt seen and examined at bedside. Patient comfortable and in no-apparent distress. WBC downtrending.    Vital Signs Last 24 Hrs  T(C): 36.4 (18 Apr 2022 04:18), Max: 37.1 (17 Apr 2022 09:00)  T(F): 97.5 (18 Apr 2022 04:18), Max: 98.7 (17 Apr 2022 09:00)  HR: 94 (18 Apr 2022 04:18) (81 - 100)  BP: 154/69 (18 Apr 2022 04:18) (114/66 - 154/69)  BP(mean): --  RR: 18 (18 Apr 2022 04:18) (18 - 18)  SpO2: 95% (18 Apr 2022 04:18) (93% - 98%)    Physical Exam:  General Appearance: Appears well, NAD  Respiratory: No labored breathing  CV: Pulse regularly present      LABS:                        8.2    16.73 )-----------( 268      ( 18 Apr 2022 04:57 )             27.0     04-18    129<L>  |  92<L>  |  44<H>  ----------------------------<  200<H>  4.0   |  22  |  6.75<H>    Ca    8.5      18 Apr 2022 04:57  Phos  5.4     04-18  Mg     2.5     04-18      PT/INR - ( 18 Apr 2022 04:57 )   PT: 14.7 sec;   INR: 1.26 ratio         PTT - ( 18 Apr 2022 04:57 )  PTT:49.1 sec      INs and OUTs:    04-17-22 @ 07:01  -  04-18-22 @ 07:00  --------------------------------------------------------  IN: 1140 mL / OUT: 0 mL / NET: 1140 mL

## 2022-04-18 NOTE — PRE-ANESTHESIA EVALUATION ADULT - NSANTHPMHFT_GEN_ALL_CORE
66 y/o M with history of CAD, s/p multiple PCI, with most recent 2/22 PCI of LAD in setting of NSTEMI, on ASA only (pradaxa), chronic atrial fibrillation maintained on Pradaxa, essential HTN, type 2 DM previously on oral medications but on insulin, diabetic neuropathy with chronic RIGHT LE venous stasis changes>left, LEFT foot ulcer seen by podiatry, past endarterectomy LEFT CEA in 2014 presenting with 1 week of decreased urine output, cystitis with hematuria

## 2022-04-18 NOTE — PROGRESS NOTE ADULT - ASSESSMENT
·  Problem: Acute kidney injury superimposed on CKD. pt currently on hd  to cont as per renal  perm cath done  avf on monday  medically stable   pulm / cards f.u for clearance noted    Lethargy resolved  - CT head no acute event       Problem/Plan - 2:  ·  Problem: Chronic atrial fibrillation. c/w a/c  cards f/u     Problem/Plan - 3:  ·  Problem: Cystitis.   treatment as per id  s/p abs     Problem/Plan - 4:  ·  Problem: Type 2 diabetes mellitus. c/w insulin   endo f/u     Problem/Plan - 5:  ·  Problem: CAD (coronary artery disease).   stable  cards f/u       s/p shiley removal / groin w/ bleeding resolved    vasc f/u   constipation  bowel regimen  enema  lactulose    gouty pain  better  added  lidoderm patch to l knee

## 2022-04-19 NOTE — PROVIDER CONTACT NOTE (OTHER) - ASSESSMENT
Vitals stable. Pt A&OX 0, not answering LOC questions. Pt refuses to answer questions but when RN left room pt stated "why does she need to know who I am" to family at bedside. Patient following some commands such as opening and closing eyes, and squeezing hand, as per EMR. Pupils are reactive.

## 2022-04-19 NOTE — CHART NOTE - NSCHARTNOTEFT_GEN_A_CORE
Notified by RN of patient with increased frustration and agitation. Patient seen and examined bedside. Patient refuses to answer any questions to accurately assess mental status at this time. States he wants to be left alone, and we do not need to know this information. Of note, RN states patient with episodes of agitation like this in the past. On exam, patient is able to move all extremities, b/l pupil exam showing GORGE, and is without facial droop. Vital signs are stable. Will continue to monitor patient. If any acute changes in mental status, will get STAT CTH. Will notify day team regarding overnight events.     Kaitlynn DAVIS  Medicine Notified by RN of patient with increased frustration and agitation. Patient seen and examined bedside. Patient refuses to answer any questions to accurately assess mental status at this time. States he wants to be left alone, and we do not need to know this information. Of note, RN states patient with episodes of agitation like this in the past. On exam, patient is able to move all extremities, b/l pupil exam showing GORGE, and is without facial droop. Vital signs are stable. Will continue to monitor patient. If any acute changes in mental status, will get STAT CTH. Will notify day team regarding overnight events.     Kaitlynn Olivera PA  Medicine    ADDENDUM: At AM vitals, patient with improved mood and able to answer all mental status questions appropriately. Will continue to monitor.

## 2022-04-19 NOTE — PROGRESS NOTE ADULT - ASSESSMENT
Patient is a 67 year old male with PMH of CAD with past multiple PCI, with most recent discharge from [place] in Feb 2022 for NSTEMI with LULU of an 85% prox LAD lesion with patient on ASA and now off Plavix per cardiology (only for one month), chronic afib on Pradaxa, HTN, type 2 DM on insulin, diabetic neuropathy with chronic RIGHT LE venous stasis changes>left, LEFT foot ulcer seen by podiatry, past endarterectomy LEFT CEA in 2014 who presented with UTI with hematuria diagnosed at urgent care and now with decreased urine output.   Plan to take patient to OR Monday 4/18 for LUE AVF creation, possible graft placement.    Acute cystitis  Leukocytosis  Noted dark urine with hematuria, went to UC and was prescribed macrobid, had some improvement in urine color but then noticed decreased urine output with no voiding reported in last 2 days  UA here with pyuria -  with large LE, negative nitrites and no bacteria. UCx/BCx NGTD  Imaging: CTAP reviewed - no hydronephrosis, nephrolithiasis, or evidence of obstructive uropathy  S/p ceftriaxone x7 day course completed 4/9  C/w monitoring off Abx.   Trend temps/WBC--Elevated WBC noted. No obv localizing concerns for new or recurrent infection. Suspect this is reactive and not due to infection but rather demarginalization.     ANT on CKD3   - Nephrology following, appreciate recs   - s/p placement of shiley and initiation of HD   - pending permcath placement   - monitor Cr    - renally dose meds    - avoid nephrotoxic agents    B/l LE edema with chronic venous stasis  Acute on chronic HFpEF   - legs cool to touch, nontender, chronic changes with no sign of infection/cellulitis   - has wound on bottom of L foot - dry and does not appear infected either   - clinically overloaded, Cardiology and Nephrology following   - elevate lower extremities      DM2 with neuropathy   - Blood glucose management per primary team.    Ok to proceed w/ permacath placement via IR     Infectious Diseases will continue to follow. Please call with any questions.   Annie King M.D.  [facility], Division of Infectious Diseases [phone]

## 2022-04-19 NOTE — PROGRESS NOTE ADULT - ASSESSMENT
Impression:  67 year old gentleman pmhx CAD s/p MI/PCI/CABG, hx TIA, afib on pradaxa, HTN, DM2, PVD, gout, L CEA 2014, CKD admitted to ED with ANT, tremors, confusion and lethargy. Receiving new hemodialysis. CT head no acute changes. Mental status improved today. Pt denies any headaches, no slurred speech, no focal facial or limb weakness. Has chronic gout with bilateral finger swelling, pain.     Diagnosis:  Presentation consistent with toxic metabolic encephalopathy secondary to ANT/CKD improving with dialysis, CT head no acute changes, no focal findings on exam, no evidence of acute neurological event at this time.     Recommendations:  Continue HD as per renal  Continue supportive care per primary team  OOB / Physical therapy  Continue stroke prophylaxis- on AC for afib, antiplatelet, statin, optimize hypertension/glycemic control  more confused today but likely in setting of no recent dialysis   no further neuro inpt workup is needed  d/w rody at bedside

## 2022-04-19 NOTE — PROGRESS NOTE ADULT - SUBJECTIVE AND OBJECTIVE BOX
CARDIOLOGY FOLLOW UP - Dr. Mazariegos  Date of Service: 4/19/22  CC: events noted  denies cp, sob, and palpitations   c/o mild pain to L arm  s/p L AVG    Review of Systems:  Constitutional: No fever, weight loss, or fatigue  Respiratory: No cough, wheezing, or hemoptysis, no shortness of breath  Cardiovascular: No chest pain, palpitations, passing out, dizziness, or leg swelling  Gastrointestinal: No abd or epigastric pain.  No nausea, vomiting, or hematemesis; no diarrhea or constipation, no melena or hematochezia  Vascular: no edema       PHYSICAL EXAM:  T(C): 36.6 (04-19-22 @ 09:25), Max: 36.8 (04-18-22 @ 13:16)  HR: 107 (04-19-22 @ 09:25) (86 - 108)  BP: 154/67 (04-19-22 @ 09:25) (115/59 - 154/67)  RR: 18 (04-19-22 @ 09:25) (14 - 18)  SpO2: 92% (04-19-22 @ 09:25) (92% - 100%)  Wt(kg): --  I&O's Summary    18 Apr 2022 07:01  -  19 Apr 2022 07:00  --------------------------------------------------------  IN: 0 mL / OUT: 0 mL / NET: 0 mL        Appearance: Normal	  Cardiovascular: irreg , No JVD, No murmurs  Respiratory: Lungs clear to auscultation	  Gastrointestinal:  Soft, Non-tender, + BS	  Extremities: Normal range of motion, No clubbing, cyanosis or edema      Home Medications:  allopurinol 100 mg oral tablet: 1 tab(s) orally once a day (03 Apr 2022 06:34)  aspirin 81 mg oral delayed release tablet: 1 tab(s) orally once a day (03 Apr 2022 06:34)  bumetanide 2 mg oral tablet: 1 tab(s) orally once a day (03 Apr 2022 06:34)  insulin glargine 100 units/mL subcutaneous solution: 25 unit(s) subcutaneous once a day (at bedtime) (03 Apr 2022 06:34)  metOLazone 5 mg oral tablet: 1 tab(s) orally 3 times a week (03 Apr 2022 06:34)  metoprolol succinate 50 mg oral tablet, extended release: 2 tab(s) orally once a day (03 Apr 2022 06:34)  NovoLOG 100 units/mL subcutaneous solution: subcutaneous 4 times a day (before meals and at bedtime) (03 Apr 2022 06:34)  NovoLOG FlexPen 100 units/mL injectable solution: 10 unit(s) subcutaneous 3 times a day (03 Apr 2022 06:34)  oxycodone-acetaminophen 5 mg-325 mg oral tablet: 1 tab(s) orally 2 times a day (03 Apr 2022 06:34)  Pradaxa 150 mg oral capsule: 1 cap(s) orally 2 times a day (03 Apr 2022 06:34)  pregabalin 100 mg oral capsule: 1 cap(s) orally 3 times a day (03 Apr 2022 06:34)      MEDICATIONS  (STANDING):  allopurinol 100 milliGRAM(s) Oral daily  atorvastatin 40 milliGRAM(s) Oral at bedtime  dextrose 50% Injectable 25 Gram(s) IV Push once  dextrose 50% Injectable 25 Gram(s) IV Push once  dextrose 50% Injectable 12.5 Gram(s) IV Push once  diltiazem    milliGRAM(s) Oral daily  epoetin naz-epbx (RETACRIT) Injectable 18662 Unit(s) IV Push once  hydrALAZINE 25 milliGRAM(s) Oral three times a day  HYDROmorphone  Injectable 0.5 milliGRAM(s) IV Push once  insulin lispro (ADMELOG) corrective regimen sliding scale   SubCutaneous at bedtime  lidocaine   4% Patch 1 Patch Transdermal daily  metoprolol succinate  milliGRAM(s) Oral daily  polyethylene glycol 3350 17 Gram(s) Oral two times a day  pregabalin 25 milliGRAM(s) Oral two times a day  senna 2 Tablet(s) Oral at bedtime  sevelamer carbonate 1600 milliGRAM(s) Oral three times a day with meals      TELEMETRY: afib 	    ECG:  	  RADIOLOGY:   DIAGNOSTIC TESTING:  [ ] Echocardiogram:  [ ]  Catheterization:  [ ] Stress Test:    OTHER: 	    LABS:	 	                            8.9    15.10 )-----------( 321      ( 19 Apr 2022 07:35 )             29.4     04-19    128<L>  |  87<L>  |  67<H>  ----------------------------<  238<H>  5.6<H>   |  16<L>  |  8.47<H>    Ca    8.8      19 Apr 2022 07:34  Phos  5.4     04-18  Mg     2.5     04-18    TPro  7.3  /  Alb  2.5<L>  /  TBili  0.3  /  DBili  x   /  AST  13  /  ALT  6<L>  /  AlkPhos  193<H>  04-19    PT/INR - ( 19 Apr 2022 07:35 )   PT: 13.2 sec;   INR: 1.14 ratio         PTT - ( 19 Apr 2022 07:35 )  PTT:21.5 sec

## 2022-04-19 NOTE — PROGRESS NOTE ADULT - SUBJECTIVE AND OBJECTIVE BOX
[facility], Division of Infectious Diseases  ADRIANA Seals Y. Patel, S. Shah, G. [facility]  [phone]    Name: DYLAN DEL CASTILLO  Age: 67y  Gender: Male  MRN: [id number]    Interval History:  Patient seen and examined at bedside this morning  Notes reviewed    Antibiotics:      Medications:  allopurinol 100 milliGRAM(s) Oral daily  atorvastatin 40 milliGRAM(s) Oral at bedtime  bisacodyl 5 milliGRAM(s) Oral every 12 hours PRN  dextrose 50% Injectable 25 Gram(s) IV Push once  dextrose 50% Injectable 25 Gram(s) IV Push once  dextrose 50% Injectable 12.5 Gram(s) IV Push once  dextrose Oral Gel 15 Gram(s) Oral once PRN  diltiazem    milliGRAM(s) Oral daily  hydrALAZINE 25 milliGRAM(s) Oral three times a day  HYDROmorphone  Injectable 0.5 milliGRAM(s) IV Push once  insulin lispro (ADMELOG) corrective regimen sliding scale   SubCutaneous at bedtime  lidocaine   4% Patch 1 Patch Transdermal daily  metoprolol succinate  milliGRAM(s) Oral daily  polyethylene glycol 3350 17 Gram(s) Oral two times a day  pregabalin 25 milliGRAM(s) Oral two times a day  senna 2 Tablet(s) Oral at bedtime  sevelamer carbonate 1600 milliGRAM(s) Oral three times a day with meals  sodium chloride 0.9% lock flush 10 milliLiter(s) IV Push every 1 hour PRN      Review of Systems:  Review of systems otherwise negative except as previously noted.    Allergies: nitrofurantoin (Nephrotoxicity)    For details regarding the patient's past medical history, social history, family history, and other miscellaneous elements, please refer the initial infectious diseases consultation and/or the admitting history and physical examination for this admission.    Objective:  Vitals:   T(C): 36.6 (04-19-22 @ 06:38), Max: 36.8 (04-18-22 @ 13:16)  HR: 100 (04-19-22 @ 06:38) (86 - 108)  BP: 153/79 (04-19-22 @ 06:38) (115/59 - 154/66)  RR: 18 (04-19-22 @ 06:38) (14 - 18)  SpO2: 94% (04-19-22 @ 06:38) (93% - 100%)    Physical Examination:  General: no acute distress, on HD   HEENT: NC/AT, anicteric, neck supple  Respiratory: no acc muscle use, breathing comfortably  Cardiovascular: S1 and S2 present  Gastrointestinal: normal appearing, nondistended  Extremities: no edema, no cyanosis  Skin: no visible rash, right chest shiley      Laboratory Studies:  CBC:                       8.2    16.73 )-----------( 268      ( 18 Apr 2022 04:57 )             27.0     CMP: 04-18    129<L>  |  92<L>  |  44<H>  ----------------------------<  200<H>  4.0   |  22  |  6.75<H>    Ca    8.5      18 Apr 2022 04:57  Phos  5.4     04-18  Mg     2.5     04-18            Microbiology: reviewed      Radiology: reviewed       [facility], Division of Infectious Diseases  ADRIANA Seals Y. Patel, S. Shah, G. [facility]  [phone]    Name: DYLAN DEL CASTILLO  Age: 67y  Gender: Male  MRN: [id number]    Interval History:  Patient seen and examined at bedside this morning  Afebrile  Daughter at bedside  Notes reviewed    Antibiotics:      Medications:  allopurinol 100 milliGRAM(s) Oral daily  atorvastatin 40 milliGRAM(s) Oral at bedtime  bisacodyl 5 milliGRAM(s) Oral every 12 hours PRN  dextrose 50% Injectable 25 Gram(s) IV Push once  dextrose 50% Injectable 25 Gram(s) IV Push once  dextrose 50% Injectable 12.5 Gram(s) IV Push once  dextrose Oral Gel 15 Gram(s) Oral once PRN  diltiazem    milliGRAM(s) Oral daily  hydrALAZINE 25 milliGRAM(s) Oral three times a day  HYDROmorphone  Injectable 0.5 milliGRAM(s) IV Push once  insulin lispro (ADMELOG) corrective regimen sliding scale   SubCutaneous at bedtime  lidocaine   4% Patch 1 Patch Transdermal daily  metoprolol succinate  milliGRAM(s) Oral daily  polyethylene glycol 3350 17 Gram(s) Oral two times a day  pregabalin 25 milliGRAM(s) Oral two times a day  senna 2 Tablet(s) Oral at bedtime  sevelamer carbonate 1600 milliGRAM(s) Oral three times a day with meals  sodium chloride 0.9% lock flush 10 milliLiter(s) IV Push every 1 hour PRN      Review of Systems:  Review of systems otherwise negative except as previously noted.    Allergies: nitrofurantoin (Nephrotoxicity)    For details regarding the patient's past medical history, social history, family history, and other miscellaneous elements, please refer the initial infectious diseases consultation and/or the admitting history and physical examination for this admission.    Objective:  Vitals:   T(C): 36.6 (04-19-22 @ 06:38), Max: 36.8 (04-18-22 @ 13:16)  HR: 100 (04-19-22 @ 06:38) (86 - 108)  BP: 153/79 (04-19-22 @ 06:38) (115/59 - 154/66)  RR: 18 (04-19-22 @ 06:38) (14 - 18)  SpO2: 94% (04-19-22 @ 06:38) (93% - 100%)    Physical Examination:  General: no acute distress, on HD   HEENT: NC/AT, anicteric, neck supple  Respiratory: no acc muscle use, breathing comfortably  Cardiovascular: S1 and S2 present  Gastrointestinal: normal appearing, nondistended  Extremities: no edema, no cyanosis  Skin: no visible rash, right chest shiley      Laboratory Studies:  CBC:                       8.2    16.73 )-----------( 268      ( 18 Apr 2022 04:57 )             27.0     CMP: 04-18    129<L>  |  92<L>  |  44<H>  ----------------------------<  200<H>  4.0   |  22  |  6.75<H>    Ca    8.5      18 Apr 2022 04:57  Phos  5.4     04-18  Mg     2.5     04-18            Microbiology: reviewed      Radiology: reviewed

## 2022-04-19 NOTE — PROGRESS NOTE ADULT - SUBJECTIVE AND OBJECTIVE BOX
Admitting Diagnosis:  Chronic kidney disease [N18.9]  CHRONIC KIDNEY DISEASE, UNSPECIFIED    Background:  This is a 67 year old gentleman pmhx CAD s/p MI/PCI/CABG, hx TIA, afib on rivaroxaban, HTN, DM2, PVD, gout, L CEA 2014, and CKD who presented to  4/11/22 with ANT, tremors, confusion and lethargy.  CT head no acute changes.  S/p initiation of hemodialysis.  Mental status has improved dramatically.  Pt denies any headaches, no slurred speech, no hemiparesis.  He has chronic gout with bilateral finger swelling, pain.  He also has chronic neuropathy.  Both legs are weak.      Interval Hx:  s/p AVF yesterday, has not gotten dialysis for 2 days, more confused today but nothing focal    ******    Past Medical History:  HTN (hypertension), benign [401.1]  HLD (hyperlipidemia) [272.4]  DM type 2 (diabetes mellitus, type 2) [250.00]  TIA (transient ischemic attack) [435.9]  Atrial fibrillation [427.31]  MI (myocardial infarction) [410.90]  circa 2014 and 2022.  CAD (coronary artery disease) [414.00]  Neuropathy [G62.9]  Stage 3 chronic kidney disease [N18.30]  2019 novel coronavirus disease (COVID-19) [U07.1]  12/2021.  Received the COVID-19 vaccine x 2 as of April 2022.    Past Surgical History:  Status post angioplasty with stent [V45.82]  LULU x 3 2/7/2014  S/P carotid endarterectomy [Z98.89]  left  S/P CABG (coronary artery bypass graft) [Z95.1]        Social History:  No toxic habits    Family History:  FAMILY HISTORY:  Family history of heart disease  father    Medications:  allopurinol 100 milliGRAM(s) Oral daily  atorvastatin 40 milliGRAM(s) Oral at bedtime  bisacodyl 5 milliGRAM(s) Oral every 12 hours PRN  dextrose 50% Injectable 25 Gram(s) IV Push once  dextrose 50% Injectable 25 Gram(s) IV Push once  dextrose 50% Injectable 12.5 Gram(s) IV Push once  dextrose Oral Gel 15 Gram(s) Oral once PRN  diltiazem    milliGRAM(s) Oral daily  epoetin naz-epbx (RETACRIT) Injectable 26745 Unit(s) IV Push once  hydrALAZINE 25 milliGRAM(s) Oral three times a day  HYDROmorphone  Injectable 0.5 milliGRAM(s) IV Push once  insulin lispro (ADMELOG) corrective regimen sliding scale   SubCutaneous at bedtime  lidocaine   4% Patch 1 Patch Transdermal daily  metoprolol succinate  milliGRAM(s) Oral daily  polyethylene glycol 3350 17 Gram(s) Oral two times a day  pregabalin 25 milliGRAM(s) Oral two times a day  senna 2 Tablet(s) Oral at bedtime  sevelamer carbonate 1600 milliGRAM(s) Oral three times a day with meals  sodium chloride 0.9% lock flush 10 milliLiter(s) IV Push every 1 hour PRN      Labs:  CBC Full  -  ( 19 Apr 2022 07:35 )  WBC Count : 15.10 K/uL  RBC Count : 3.56 M/uL  Hemoglobin : 8.9 g/dL  Hematocrit : 29.4 %  Platelet Count - Automated : 321 K/uL  Mean Cell Volume : 82.6 fl  Mean Cell Hemoglobin : 25.0 pg  Mean Cell Hemoglobin Concentration : 30.3 gm/dL  Auto Neutrophil # : x  Auto Lymphocyte # : x  Auto Monocyte # : x  Auto Eosinophil # : x  Auto Basophil # : x  Auto Neutrophil % : x  Auto Lymphocyte % : x  Auto Monocyte % : x  Auto Eosinophil % : x  Auto Basophil % : x    04-19    128<L>  |  87<L>  |  67<H>  ----------------------------<  238<H>  5.6<H>   |  16<L>  |  8.47<H>    Ca    8.8      19 Apr 2022 07:34  Phos  5.4     04-18  Mg     2.5     04-18    TPro  7.3  /  Alb  2.5<L>  /  TBili  0.3  /  DBili  x   /  AST  13  /  ALT  6<L>  /  AlkPhos  193<H>  04-19    CAPILLARY BLOOD GLUCOSE      POCT Blood Glucose.: 208 mg/dL (18 Apr 2022 21:18)  POCT Blood Glucose.: 167 mg/dL (18 Apr 2022 18:56)  POCT Blood Glucose.: 149 mg/dL (18 Apr 2022 12:27)    LIVER FUNCTIONS - ( 19 Apr 2022 07:34 )  Alb: 2.5 g/dL / Pro: 7.3 g/dL / ALK PHOS: 193 U/L / ALT: 6 U/L / AST: 13 U/L / GGT: x           PT/INR - ( 19 Apr 2022 07:35 )   PT: 13.2 sec;   INR: 1.14 ratio         PTT - ( 19 Apr 2022 07:35 )  PTT:21.5 sec        Vitals:  Vital Signs Last 24 Hrs  T(C): 36.6 (19 Apr 2022 09:25), Max: 36.8 (18 Apr 2022 13:16)  T(F): 97.9 (19 Apr 2022 09:25), Max: 98.2 (18 Apr 2022 13:16)  HR: 107 (19 Apr 2022 09:25) (86 - 108)  BP: 154/67 (19 Apr 2022 09:25) (115/59 - 154/67)  BP(mean): 96 (18 Apr 2022 19:30) (80 - 96)  RR: 18 (19 Apr 2022 09:25) (14 - 18)  SpO2: 92% (19 Apr 2022 09:25) (92% - 100%)Family history of CVA  mother    Allergies:  nitrofurantoin (Nephrotoxicity)    ROS: as per HPI.          NEUROLOGICAL EXAM:    Mental status: Awake, alert, and in no apparent distress. Oriented to person, place, and partially to time.  Language intact.  Attention imparied    Cranial Nerves: Pupils were equal, round, reactive to light. Extraocular movements were intact. B BTT Facial sensation was intact to light touch. There was no facial asymmetry. The palate was upgoing symmetrically and tongue was midline. Hearing acuity was intact to finger rub AU. Shoulder shrug was full bilaterally    Motor exam: Bulk and tone were normal. No downward drift in arms.  both legs minimally antigravity though in part limited by edema, PVD, and neuropathy.  Fine finger movements were symmetric. There was bilateral symmetric pronator drift    Reflexes: DTRs depressed, flexor plantars.     Sensation: Intact to light touch, temperature.    Coordination: no gross dysmetria    Gait: deferred    Imaging:    ACC: [id number] EXAM:  CT BRAIN                        PROCEDURE DATE:  04/09/2022      INTERPRETATION:  CLINICAL INFORMATION:  Altered mental status, on   anticoagulation .    TECHNIQUE: Multiple contiguous axial images were acquired from the   skullbase to the vertex without the administration of intravenous   contrast.  Coronal and sagittal reformations were made.    COMPARISON: CT head 10/21/2021, brain MRI 02/23/2014.    FINDINGS:    Scattered lacunar infarcts in the bilateral cerebralhemispheres are   grossly stable compared to the 10/21/2021 head CT. No new additional   infarcts are noted over the time interval.    No acute intracranial hemorrhage, mass effect, or midline shift. The   brain demonstrates periventricular hypoattenuation, nonspecific in   etiology but likely representing chronic microvascular ischemic changes.    No abnormal extra-axial fluid collections are present.    The ventricles and sulci demonstrate age appropriate involutional   changes.  The basal cisterns are patent.    The orbits are unremarkable. The paranasal sinuses are clear. The mastoid   air cells are clear. The calvarium is intact.    IMPRESSION:    No acute intracranial abnormality is noted. If the patient has new and   persistent symptoms, consider short interval follow-up head CT or brain   MRI.    --- End of Report ---    GATITO VILLARREAL MD; Resident Radiologist  This document has been electronically signed.  JESSE CORONA MD; Attending Radiologist  This document has been electronically signed. Apr 9 2022  2:00PM

## 2022-04-19 NOTE — PROGRESS NOTE ADULT - ASSESSMENT
he patient is a 67y M who is now several hours post-op from a L AVG. Recovering.     Plan:  - will attempt to reassess patient later this morning and remove ACE bandage  - Pain control as needed  - Diet: per primary  - Hep GTT may be restarted at 10am if surgical site is okay  - Encourage OOB and ambulating as tolerated    Vascular Surgery  [phone]

## 2022-04-19 NOTE — PROGRESS NOTE ADULT - ASSESSMENT
A/P    68 y/o M with history of CAD, s/p multiple PCI, with most recent 2/22 PCI of LAD in setting of NSTEMI, on ASA only (pradaxa), chronic atrial fibrillation maintained on Pradaxa, essential HTN, type 2 DM previously on oral medications but on insulin, diabetic neuropathy with chronic RIGHT LE venous stasis changes>left, LEFT foot ulcer seen by podiatry, past endarterectomy LEFT CEA in 2014 presenting with 1 week of decreased urine output, cystitis with hematuria     #ANT on CKD, new HD  -oliguric renal failure in setting of ? UTI/cystitis, r/o obstruction ? secondary to abx   -worsened with diuretic use but unlikely due to hypovolemia alone from diuretic use   -hyperkalemia improved   -New HD for uremia, renal failure- sp RIJ shiley placed on 4/12 in IR.   -renal f/u -sp rrt 4/12 for uncontrolled bleeding sp  right groin shiley removal  - monitor h/h - stable  -s/p L AVG 4/18 -- vasc f/u   -IR following planning tunnelled HD catheter placement this week - pt can proceed from a cv standpoint   -renal f/ u    #AMS/Lethargy  -sig improved post hd   -events noted last night - MS back to baseline   -likely 2/2 Uremic encephalopathy   -ABG noted  -med f/u   -MICU eval noted 4/5  -head ct ok    #Acute on chronic HFpEF  -remains overloaded but improved  -continue aggressive volume removal with HD  -now tolerating po - c/w  bb    #Chronic AF  -rates improved sp cardizem gtt    -c/w metoprolol succ 100 mg qd  -c/w dilt cd 120mg daily    -resume hep gtt per vasc     #HTN  -stable--- c/w meds   -increase hydral if bp remains elevated     #CAD, s/p PCI, most recent 2/2022  -stable, cont ASA, off plavix due to a/c indication  -statin     #R carotid stenosis  -cont med tx  -MRA noted with Greater than 75% stenosis of the right distal common carotid artery. Approximately 60% stenosis of the proximal left internal carotid artery.  -Continue ASA and Statin Therapy  -vasc outpt f/u Dr Pinto    ACP- Advanced Care Planning  -Advanced care planning discussed with patient. Advanced care planning forms discussed with patient and/or family.  Risks, benefits, and alternatives of medical/cardiac procedures were discussed in detail with all questions answered.  30 minutes were spent addressing advance care planning.

## 2022-04-19 NOTE — PROGRESS NOTE ADULT - ASSESSMENT
·  Problem: Acute kidney injury superimposed on CKD. pt currently on hd  to cont as per renal  perm cath done  avf done luext   perm cath today  medically stable   pulm / cards f.u for clearance noted    Lethargy resolved  - CT head no acute event       Problem/Plan - 2:  ·  Problem: Chronic atrial fibrillation. c/w a/c  cards f/u     Problem/Plan - 3:  ·  Problem: Cystitis.   treatment as per id  s/p abs     Problem/Plan - 4:  ·  Problem: Type 2 diabetes mellitus. c/w insulin   endo f/u     Problem/Plan - 5:  ·  Problem: CAD (coronary artery disease).   stable  cards f/u       s/p shiley removal / groin w/ bleeding resolved      constipation better  bowel regimen  enema  lactulose    gouty pain  better  added  lidoderm patch to l knee

## 2022-04-19 NOTE — PROGRESS NOTE ADULT - PROBLEM SELECTOR PLAN 5
-ABG with no CO2 retention, sedating medications held  -? if 2nd to uremia, HD per renal.  -Mental status had improved. Seems more confused today - check ABG after HD.

## 2022-04-19 NOTE — PROGRESS NOTE ADULT - ASSESSMENT
67 year old gentleman with PMH of CAD, NSTEMI s/p 3 stents in 2014 (stents to LAD, proximal and distal LCx), ischemic cardiomyopathy, HTN for 10 years, T2DM for 26 years c/b peripheral neuropathy, CVA, TIA, Afib on Pradaxa, chronic RLE wound, L carotid stenosis s/p endarterectomy (2014) just recently admitted  to the [facility] on 2/19/2022 with acute onset chest pain for one day found to have an NSTEMI, CAD, s/p PCI in setting of increased AF rates off bb for 3 days and resolved chest pain, his CKMB peaked and Echo noted with EF 60%,normal LV function, mild TR, mild LA. He was s/p [facility] with 85% proximal LAD s/p balloon angioplasty and LULU. He presented to [facility] ED with decreased urine output over the week. [facility]. Stevens went to [facility] for hematuria and was started  on Nitrofurantoin. Pt is still taking Nitrofurantoin w/ 5 days left. He came to the ED due to worsening symptoms. Pt reports having a similar incident 4-5 years ago that resolved spontaneously. He reports increased leg edema Dyspnea worse on exertion. his baseline Serum creatinine is in the 2.0 to 2.3 mg/dl range. ANT with serum creatinine of 8.29 with bun 144 and k 5.5        1- ANT on ckd III   2- chf chronic   3- hyperkalemia  on hd   4- uremia improving   5- hyperphosphatemia         ANT likely due to ATN however etiology of his ATN unclear ?due to infection recently vs other etiologies   c4/yari anca anti gbm spep/ipep bence pinto normal range  c3 is low etiology unclear, suspected in setting of infection.   HD today  F180, 225 min, , , 2.5L fluid removal  see HD flowsheet  states had 1 episode of urine incontinence   renvela 2 tab with meals   anemia- epogen 51666 Units TIW with HD.  uric acid normal range  neuropathy   continue lyrica 25 mg BID  d/w IR today, given hyperkalemia this am, will have HD and plan for permcath tomorrow.  strict I/O  trend creatinine and electrolytes daily

## 2022-04-19 NOTE — PROGRESS NOTE ADULT - NS ATTEND AMEND GEN_ALL_CORE FT
pt with low C3 ideally would undergo renal bx however, pt recently had coronary stent placement and has hx of TIA as well. pt requires asa without discontinuation as d/w cards. therefore, renal bx unable to be performed   cont hd as planned above   permcath in am   increase lyrica dose and cont percocet

## 2022-04-19 NOTE — PROGRESS NOTE ADULT - SUBJECTIVE AND OBJECTIVE BOX
[facility]   [phone]  RENAL FOLLOW UP NOTE  --------------------------------------------------------------------------------  Chief Complaint:    24 hour events/subjective:    Patient seen and examined with Dr. Art duncan cp  c/o B/L LE neuropathy    PAST HISTORY  --------------------------------------------------------------------------------  No significant changes to PMH, PSH, FHx, SHx, unless otherwise noted    ALLERGIES & MEDICATIONS  --------------------------------------------------------------------------------  Allergies    nitrofurantoin (Nephrotoxicity)    Intolerances      Standing Inpatient Medications  allopurinol 100 milliGRAM(s) Oral daily  atorvastatin 40 milliGRAM(s) Oral at bedtime  chlorhexidine 2% Cloths 1 Application(s) Topical <User Schedule>  dextrose 50% Injectable 25 Gram(s) IV Push once  dextrose 50% Injectable 25 Gram(s) IV Push once  dextrose 50% Injectable 12.5 Gram(s) IV Push once  diltiazem    milliGRAM(s) Oral daily  heparin  Infusion. 3900 Unit(s)/Hr IV Continuous <Continuous>  hydrALAZINE 25 milliGRAM(s) Oral three times a day  insulin lispro (ADMELOG) corrective regimen sliding scale   SubCutaneous at bedtime  lidocaine   4% Patch 1 Patch Transdermal daily  metoprolol succinate  milliGRAM(s) Oral daily  polyethylene glycol 3350 17 Gram(s) Oral two times a day  pregabalin 25 milliGRAM(s) Oral two times a day  senna 2 Tablet(s) Oral at bedtime  sevelamer carbonate 1600 milliGRAM(s) Oral three times a day with meals    PRN Inpatient Medications  bisacodyl 5 milliGRAM(s) Oral every 12 hours PRN  dextrose Oral Gel 15 Gram(s) Oral once PRN  heparin   Injectable 44082 Unit(s) IV Push every 6 hours PRN  heparin   Injectable 5000 Unit(s) IV Push every 6 hours PRN  sodium chloride 0.9% lock flush 10 milliLiter(s) IV Push every 1 hour PRN      REVIEW OF SYSTEMS  --------------------------------------------------------------------------------    Gen: denies fevers/chills,  CVS: denies chest pain/palpitations  Resp: denies SOB/Cough  GI: Denies N/V/Abd pain  : Denies dysuria    All other systems were reviewed and are negative, except as noted.    VITALS/PHYSICAL EXAM  --------------------------------------------------------------------------------  T(C): 36.5 (04-19-22 @ 11:25), Max: 36.7 (04-19-22 @ 04:10)  HR: 94 (04-19-22 @ 11:25) (86 - 108)  BP: 147/76 (04-19-22 @ 11:25) (115/59 - 154/67)  RR: 18 (04-19-22 @ 11:25) (14 - 18)  SpO2: 94% (04-19-22 @ 11:25) (92% - 100%)  Wt(kg): --  Height (cm): 180.3 (04-18-22 @ 12:56)  Weight (kg): 137 (04-18-22 @ 12:56)  BMI (kg/m2): 42.1 (04-18-22 @ 12:56)  BSA (m2): 2.51 (04-18-22 @ 12:56)      04-18-22 @ 07:01  -  04-19-22 @ 07:00  --------------------------------------------------------  IN: 0 mL / OUT: 0 mL / NET: 0 mL      Physical Exam:  	              Gen: Appears comfortable, on RA  	Pulm: Decreased breath sounds b/l bases.  	CV: No JVD. IRR,    	Abd: +BS, soft, nontender, softly distended, obese   	: No suprapubic tenderness.               Extremity UE: increased warmth, R hand, increased warmth, tender, swollen              Extremity LE: + hyperpigmented bilateral LE with B/L non-pitting edema- improved, B/L LE tender on palpation              Vascular: R IJ HD catheter, L arm AVF wrapped with ACE bandage    LABS/STUDIES  --------------------------------------------------------------------------------              8.9    15.10 >-----------<  321      [04-19-22 @ 07:35]              29.4     128  |  87  |  67  ----------------------------<  238      [04-19-22 @ 07:34]  5.6   |  16  |  8.47        Ca     8.8     [04-19-22 @ 07:34]      Mg     2.5     [04-18-22 @ 04:57]      Phos  5.4     [04-18-22 @ 04:57]    TPro  7.3  /  Alb  2.5  /  TBili  0.3  /  DBili  x   /  AST  13  /  ALT  6   /  AlkPhos  193  [04-19-22 @ 07:34]    PT/INR: PT 13.2 , INR 1.14       [04-19-22 @ 07:35]  PTT: 21.5       [04-19-22 @ 07:35]    Uric acid 5.2      [04-18-22 @ 04:57]    Creatinine Trend:  SCr 8.47 [04-19 @ 07:34]  SCr 6.75 [04-18 @ 04:57]  SCr 4.90 [04-17 @ 04:55]  SCr 5.37 [04-16 @ 07:43]  SCr 6.91 [04-15 @ 06:39]              Urinalysis - [04-03-22 @ 00:48]      Color Light Orange / Appearance Turbid / SG 1.021 / pH 5.5      Gluc Negative / Ketone Negative  / Bili Negative / Urobili Negative       Blood Large / Protein 300 mg/dL / Leuk Est Large / Nitrite Negative      RBC 20 /  / Hyaline 10 / Gran  / Sq Epi  / Non Sq Epi 3 / Bacteria Negative      Iron 16, TIBC 239, %sat 7      [04-15-22 @ 10:16]  Ferritin 172      [04-15-22 @ 09:05]  PTH -- (Ca 8.3)      [04-15-22 @ 12:18]   150  PTH -- (Ca --)      [04-03-22 @ 23:06]   97  HbA1c 11.7      [11-07-19 @ 09:14]  TSH 1.98      [04-05-22 @ 05:19]    DEREJE: titer Negative, pattern Negative      [04-11-22 @ 16:24]  C3 Complement 61      [04-11-22 @ 16:56]  C4 Complement 28      [04-11-22 @ 16:56]  ANCA: cANCA Negative, pANCA Negative, atypical ANCA Indeterminate Method interference due to DEREJE fluorescence      [04-11-22 @ 16:24]  anti-GBM 3      [04-11-22 @ 19:27]  Free Light Chains: kappa 16.92, lambda 12.63, ratio = 1.34      [04-11 @ 16:56]  Immunofixation Serum: No Monoclonal Band Identified    Reference Range: None Detected      [04-11-22 @ 16:56]  SPEP Interpretation: Normal Electrophoresis Pattern      [04-11-22 @ 16:56]

## 2022-04-19 NOTE — PROGRESS NOTE ADULT - SUBJECTIVE AND OBJECTIVE BOX
DATE OF SERVICE: 04-19-22 @ 10:13  CHIEF COMPLAINT:Patient is a 67y old  Male who presents with a chief complaint of Decreased urine output for the past week, leg oedema (19 Apr 2022 09:49)    	        PAST MEDICAL & SURGICAL HISTORY:  HTN (hypertension), benign    HLD (hyperlipidemia)    DM type 2 (diabetes mellitus, type 2)    TIA (transient ischemic attack)    Atrial fibrillation    MI (myocardial infarction)  circa 2014 and 2022.    CAD (coronary artery disease)    Neuropathy    Stage 3 chronic kidney disease    2019 novel coronavirus disease (COVID-19)  12/2021.  Received the COVID-19 vaccine x 2 as of April 2022.    Status post angioplasty with stent  LULU x 3 2/7/2014    S/P carotid endarterectomy  left            pain lupper ext from sx  NECK: No pain or stiffness  RESPIRATORY: No cough, wheezing, chills or hemoptysis; No Shortness of Breath  CARDIOVASCULAR: No chest pain, palpitations, passing out,   GASTROINTESTINAL: No abdominal or epigastric pain. No nausea, vomiting, or hematemesis;   +bm  GENITOURINARY: No dysuria, frequency, hematuria, or incontinence  NEUROLOGICAL: No headaches,    Medications:  MEDICATIONS  (STANDING):  allopurinol 100 milliGRAM(s) Oral daily  atorvastatin 40 milliGRAM(s) Oral at bedtime  dextrose 50% Injectable 25 Gram(s) IV Push once  dextrose 50% Injectable 25 Gram(s) IV Push once  dextrose 50% Injectable 12.5 Gram(s) IV Push once  diltiazem    milliGRAM(s) Oral daily  epoetin naz-epbx (RETACRIT) Injectable 75426 Unit(s) IV Push once  hydrALAZINE 25 milliGRAM(s) Oral three times a day  HYDROmorphone  Injectable 0.5 milliGRAM(s) IV Push once  insulin lispro (ADMELOG) corrective regimen sliding scale   SubCutaneous at bedtime  lidocaine   4% Patch 1 Patch Transdermal daily  metoprolol succinate  milliGRAM(s) Oral daily  polyethylene glycol 3350 17 Gram(s) Oral two times a day  pregabalin 25 milliGRAM(s) Oral two times a day  senna 2 Tablet(s) Oral at bedtime  sevelamer carbonate 1600 milliGRAM(s) Oral three times a day with meals    MEDICATIONS  (PRN):  bisacodyl 5 milliGRAM(s) Oral every 12 hours PRN Constipation  dextrose Oral Gel 15 Gram(s) Oral once PRN Blood Glucose LESS THAN 70 milliGRAM(s)/deciliter  sodium chloride 0.9% lock flush 10 milliLiter(s) IV Push every 1 hour PRN Pre/post blood products, medications, blood draw, and to maintain line patency    	    PHYSICAL EXAM:  T(C): 36.6 (04-19-22 @ 09:25), Max: 36.8 (04-18-22 @ 13:16)  HR: 107 (04-19-22 @ 09:25) (86 - 108)  BP: 154/67 (04-19-22 @ 09:25) (115/59 - 154/67)  RR: 18 (04-19-22 @ 09:25) (14 - 18)  SpO2: 92% (04-19-22 @ 09:25) (92% - 100%)  Wt(kg): --  I&O's Summary    18 Apr 2022 07:01  -  19 Apr 2022 07:00  --------------------------------------------------------  IN: 0 mL / OUT: 0 mL / NET: 0 mL        Appearance: Normal	  HEENT:   Normal oral mucosa, PERRL, EOMI	    Cardiovascular: Normal S1 S2, No JVD,   Respiratory: Lungs clear to auscultation	  Psychiatry: A & O x 3, Mood & affect appropriate  Gastrointestinal:  Soft, Non-tender, + BS	    Neurologic: Non-focal  Extremities: pvd  lue svf     TELEMETRY: 	    ECG:  	  RADIOLOGY:  OTHER: 	  	  LABS:	 	    CARDIAC MARKERS:                                8.9    15.10 )-----------( 321      ( 19 Apr 2022 07:35 )             29.4     04-19    128<L>  |  87<L>  |  67<H>  ----------------------------<  238<H>  5.6<H>   |  16<L>  |  8.47<H>    Ca    8.8      19 Apr 2022 07:34  Phos  5.4     04-18  Mg     2.5     04-18    TPro  7.3  /  Alb  2.5<L>  /  TBili  0.3  /  DBili  x   /  AST  13  /  ALT  6<L>  /  AlkPhos  193<H>  04-19    proBNP:   Lipid Profile:   HgA1c:   TSH:

## 2022-04-19 NOTE — PROGRESS NOTE ADULT - SUBJECTIVE AND OBJECTIVE BOX
Surgery Progress Note    SUBJECTIVE: Pt seen and examined at bedside. Patient comfortable and in no-apparent distress. Patient with AMS overnight. Uncooperative with exam this morning, however not complaining of pain or weakness per nursing.    Vital Signs Last 24 Hrs  T(C): 36.6 (19 Apr 2022 06:38), Max: 36.8 (18 Apr 2022 13:16)  T(F): 97.9 (19 Apr 2022 06:38), Max: 98.2 (18 Apr 2022 13:16)  HR: 100 (19 Apr 2022 06:38) (86 - 108)  BP: 153/79 (19 Apr 2022 06:38) (115/59 - 154/66)  BP(mean): 96 (18 Apr 2022 19:30) (80 - 96)  RR: 18 (19 Apr 2022 06:38) (14 - 18)  SpO2: 94% (19 Apr 2022 06:38) (93% - 100%)    Physical Exam:  General Appearance: Appears well, NAD  Respiratory: No labored breathing  CV: Pulse regularly present  LUE motor grossly intact    LABS:                        8.2    16.73 )-----------( 268      ( 18 Apr 2022 04:57 )             27.0     04-18    129<L>  |  92<L>  |  44<H>  ----------------------------<  200<H>  4.0   |  22  |  6.75<H>    Ca    8.5      18 Apr 2022 04:57  Phos  5.4     04-18  Mg     2.5     04-18      PT/INR - ( 18 Apr 2022 04:57 )   PT: 14.7 sec;   INR: 1.26 ratio         PTT - ( 18 Apr 2022 19:33 )  PTT:29.5 sec      INs and OUTs:    04-18-22 @ 07:01  -  04-19-22 @ 07:00  --------------------------------------------------------  IN: 0 mL / OUT: 0 mL / NET: 0 mL

## 2022-04-19 NOTE — PROGRESS NOTE ADULT - SUBJECTIVE AND OBJECTIVE BOX
Follow-up Pulm Progress Note    More confused today  Sats 97% 2LNC   Denies SOB/CP     Medications:  MEDICATIONS  (STANDING):  allopurinol 100 milliGRAM(s) Oral daily  atorvastatin 40 milliGRAM(s) Oral at bedtime  chlorhexidine 2% Cloths 1 Application(s) Topical <User Schedule>  dextrose 50% Injectable 25 Gram(s) IV Push once  dextrose 50% Injectable 25 Gram(s) IV Push once  dextrose 50% Injectable 12.5 Gram(s) IV Push once  diltiazem    milliGRAM(s) Oral daily  heparin  Infusion. 3900 Unit(s)/Hr (39 mL/Hr) IV Continuous <Continuous>  hydrALAZINE 25 milliGRAM(s) Oral three times a day  HYDROmorphone  Injectable 0.5 milliGRAM(s) IV Push once  insulin lispro (ADMELOG) corrective regimen sliding scale   SubCutaneous at bedtime  lidocaine   4% Patch 1 Patch Transdermal daily  metoprolol succinate  milliGRAM(s) Oral daily  polyethylene glycol 3350 17 Gram(s) Oral two times a day  pregabalin 25 milliGRAM(s) Oral two times a day  senna 2 Tablet(s) Oral at bedtime  sevelamer carbonate 1600 milliGRAM(s) Oral three times a day with meals    MEDICATIONS  (PRN):  bisacodyl 5 milliGRAM(s) Oral every 12 hours PRN Constipation  dextrose Oral Gel 15 Gram(s) Oral once PRN Blood Glucose LESS THAN 70 milliGRAM(s)/deciliter  heparin   Injectable 5000 Unit(s) IV Push every 6 hours PRN For aPTT between 40 - 57  heparin   Injectable 83254 Unit(s) IV Push every 6 hours PRN For aPTT less than 40  sodium chloride 0.9% lock flush 10 milliLiter(s) IV Push every 1 hour PRN Pre/post blood products, medications, blood draw, and to maintain line patency          Vital Signs Last 24 Hrs  T(C): 36.5 (19 Apr 2022 11:25), Max: 36.8 (18 Apr 2022 13:16)  T(F): 97.7 (19 Apr 2022 11:25), Max: 98.2 (18 Apr 2022 13:16)  HR: 94 (19 Apr 2022 11:25) (86 - 108)  BP: 147/76 (19 Apr 2022 11:25) (115/59 - 154/67)  BP(mean): 96 (18 Apr 2022 19:30) (80 - 96)  RR: 18 (19 Apr 2022 11:25) (14 - 18)  SpO2: 94% (19 Apr 2022 11:25) (92% - 100%)          04-18 @ 07:01  -  04-19 @ 07:00  --------------------------------------------------------  IN: 0 mL / OUT: 0 mL / NET: 0 mL          LABS:                        8.9    15.10 )-----------( 321      ( 19 Apr 2022 07:35 )             29.4     04-19    128<L>  |  87<L>  |  67<H>  ----------------------------<  238<H>  5.6<H>   |  16<L>  |  8.47<H>    Ca    8.8      19 Apr 2022 07:34  Phos  5.4     04-18  Mg     2.5     04-18    TPro  7.3  /  Alb  2.5<L>  /  TBili  0.3  /  DBili  x   /  AST  13  /  ALT  6<L>  /  AlkPhos  193<H>  04-19          CAPILLARY BLOOD GLUCOSE      POCT Blood Glucose.: 189 mg/dL (19 Apr 2022 11:53)    PT/INR - ( 19 Apr 2022 07:35 )   PT: 13.2 sec;   INR: 1.14 ratio         PTT - ( 19 Apr 2022 07:35 )  PTT:21.5 sec    Procalcitonin, Serum: 0.80 ng/mL (04-17-22 @ 16:50)      DEREJE Negative 04-11 @ 16:24  Anti SS-1 --  Anti SS-2 --  Anti RNP --  RF -- 04-11 @ 16:24    Atypical ANCA Indeterminate Method interference due to DEREJE fluorescence 04-11 @ 16:24  c-ANCA titer -- 04-11 @ 16:24  c-ANCA Negative 04-11 @ 16:24  p-ANCA Negative 04-11 @ 16:24        RADIOLOGY REVIEWED  CXR 4/11: unchanged b/l effusions, atelectasis    CT chest: < from: CT Chest No Cont (04.04.22 @ 16:52) >  FINDINGS:    AIRWAYS, LUNGS, PLEURA: Tracheal secretions. Small left greater than   right pleural effusions increased compared to 2/19/2022. Right-sided   pleural thickening. Lingular and left greater than right basilar   opacification, likely atelectasis.    MEDIASTINUM: Cardiomegaly. No pericardial effusion. Thoracic aorta normal   caliber.  No large mediastinal lymph nodes.    IMAGED ABDOMEN: Nonspecific perinephric stranding.    SOFT TISSUES: Unremarkable.    BONES: Unremarkable.      IMPRESSION:.    Small left greater than right bilateral pleural effusions increased in   size compared to 2/19/2022.    Lingular and left greater than right basilar opacification, likely   atelectasis.    --- End of Report ---    < end of copied text >

## 2022-04-20 NOTE — PROGRESS NOTE ADULT - SUBJECTIVE AND OBJECTIVE BOX
[facility], Division of Infectious Diseases  ADRIANA Seals Y. Patel, S. Shah, G. [facility]  [phone]    Name: DYLAN DEL CASTILLO  Age: 67y  Gender: Male  MRN: [id number]    Interval History:  Patient seen and examined at bedside this morning  No acute overnight events. Afebrile   Still in pain  Son at bedside  Permcath today  Notes reviewed    Antibiotics:      Medications:  allopurinol 100 milliGRAM(s) Oral daily  atorvastatin 40 milliGRAM(s) Oral at bedtime  bisacodyl 5 milliGRAM(s) Oral every 12 hours PRN  chlorhexidine 2% Cloths 1 Application(s) Topical <User Schedule>  dextrose 50% Injectable 25 Gram(s) IV Push once  dextrose 50% Injectable 25 Gram(s) IV Push once  dextrose 50% Injectable 12.5 Gram(s) IV Push once  dextrose Oral Gel 15 Gram(s) Oral once PRN  diltiazem    milliGRAM(s) Oral daily  heparin  Infusion 4200 Unit(s)/Hr IV Continuous <Continuous>  hydrALAZINE 25 milliGRAM(s) Oral three times a day  insulin lispro (ADMELOG) corrective regimen sliding scale   SubCutaneous at bedtime  lidocaine   4% Patch 1 Patch Transdermal daily  metoprolol succinate  milliGRAM(s) Oral daily  polyethylene glycol 3350 17 Gram(s) Oral two times a day  pregabalin 25 milliGRAM(s) Oral two times a day  senna 2 Tablet(s) Oral at bedtime  sevelamer carbonate 1600 milliGRAM(s) Oral three times a day with meals  sodium chloride 0.9% lock flush 10 milliLiter(s) IV Push every 1 hour PRN      Review of Systems:  Review of systems otherwise negative except as previously noted.    Allergies: nitrofurantoin (Nephrotoxicity)    For details regarding the patient's past medical history, social history, family history, and other miscellaneous elements, please refer the initial infectious diseases consultation and/or the admitting history and physical examination for this admission.    Objective:  Vitals:   T(C): 36.8 (04-20-22 @ 12:13), Max: 36.8 (04-20-22 @ 12:13)  HR: 85 (04-20-22 @ 12:13) (66 - 109)  BP: 155/75 (04-20-22 @ 12:13) (131/64 - 165/74)  RR: 18 (04-20-22 @ 12:13) (18 - 18)  SpO2: 98% (04-20-22 @ 12:13) (93% - 98%)    Physical Examination:  General: no acute distress, on HD   HEENT: NC/AT, anicteric, neck supple  Respiratory: no acc muscle use, breathing comfortably  Cardiovascular: S1 and S2 present  Gastrointestinal: normal appearing, nondistended  Extremities: no edema, no cyanosis  Skin: no visible rash, right chest shiley      Laboratory Studies:  CBC:                       9.2    18.04 )-----------( 283      ( 20 Apr 2022 07:13 )             31.2     CMP: 04-20    131<L>  |  94<L>  |  46<H>  ----------------------------<  186<H>  5.3   |  19<L>  |  5.83<H>    Ca    9.0      20 Apr 2022 07:13    TPro  7.3  /  Alb  2.5<L>  /  TBili  0.3  /  DBili  x   /  AST  13  /  ALT  6<L>  /  AlkPhos  193<H>  04-19    LIVER FUNCTIONS - ( 19 Apr 2022 07:34 )  Alb: 2.5 g/dL / Pro: 7.3 g/dL / ALK PHOS: 193 U/L / ALT: 6 U/L / AST: 13 U/L / GGT: x               Microbiology: reviewed      Radiology: reviewed

## 2022-04-20 NOTE — PROGRESS NOTE ADULT - ASSESSMENT
·  Problem: Acute kidney injury superimposed on CKD. pt currently on hd  to cont as per renal  avf done luext   perm cath today  medically stable   pulm / cards f.u for clearance noted    Lethargy resolved  - CT head no acute event       Problem/Plan - 2:  ·  Problem: Chronic atrial fibrillation. c/w a/c  cards f/u     Problem/Plan - 3:  ·  Problem: Cystitis.   treatment as per id  s/p abs     Problem/Plan - 4:  ·  Problem: Type 2 diabetes mellitus. c/w insulin   endo f/u     Problem/Plan - 5:  ·  Problem: CAD (coronary artery disease).   stable  cards f/u       s/p shiley removal / groin w/ bleeding resolved      constipation better  bowel regimen  enema  lactulose    gouty pain  better  added  lidoderm patch to l knee

## 2022-04-20 NOTE — PROGRESS NOTE ADULT - SUBJECTIVE AND OBJECTIVE BOX
Admitting Diagnosis:  Chronic kidney disease [N18.9]  CHRONIC KIDNEY DISEASE, UNSPECIFIED    Background:  This is a 67 year old gentleman pmhx CAD s/p MI/PCI/CABG, hx TIA, afib on rivaroxaban, HTN, DM2, PVD, gout, L CEA 2014, and CKD who presented to [facility] 4/11/22 with ANT, tremors, confusion and lethargy.  CT head no acute changes.  S/p initiation of hemodialysis.  Mental status has improved dramatically.  Pt denies any headaches, no slurred speech, no hemiparesis.  He has chronic gout with bilateral finger swelling, pain.  He also has chronic neuropathy.  Both legs are weak.      Interval Hx:  tired in the morning, awakens and oriented to year and month but not day, language function is intact, not cooperative with exam but no focality    ******    Past Medical History:  HTN (hypertension), benign [401.1]  HLD (hyperlipidemia) [272.4]  DM type 2 (diabetes mellitus, type 2) [250.00]  TIA (transient ischemic attack) [435.9]  Atrial fibrillation [427.31]  MI (myocardial infarction) [410.90]  circa 2014 and 2022.  CAD (coronary artery disease) [414.00]  Neuropathy [G62.9]  Stage 3 chronic kidney disease [N18.30]  2019 novel coronavirus disease (COVID-19) [U07.1]  12/2021.  Received the COVID-19 vaccine x 2 as of April 2022.    Past Surgical History:  Status post angioplasty with stent [V45.82]  LULU x 3 2/7/2014  S/P carotid endarterectomy [Z98.89]  left  S/P CABG (coronary artery bypass graft) [Z95.1]        Social History:  No toxic habits    Family History:  FAMILY HISTORY:  Family history of heart disease    Medications:  allopurinol 100 milliGRAM(s) Oral daily  atorvastatin 40 milliGRAM(s) Oral at bedtime  bisacodyl 5 milliGRAM(s) Oral every 12 hours PRN  chlorhexidine 2% Cloths 1 Application(s) Topical <User Schedule>  dextrose 50% Injectable 25 Gram(s) IV Push once  dextrose 50% Injectable 25 Gram(s) IV Push once  dextrose 50% Injectable 12.5 Gram(s) IV Push once  dextrose Oral Gel 15 Gram(s) Oral once PRN  diltiazem    milliGRAM(s) Oral daily  heparin  Infusion 4200 Unit(s)/Hr IV Continuous <Continuous>  hydrALAZINE 25 milliGRAM(s) Oral three times a day  insulin lispro (ADMELOG) corrective regimen sliding scale   SubCutaneous at bedtime  lidocaine   4% Patch 1 Patch Transdermal daily  metoprolol succinate  milliGRAM(s) Oral daily  polyethylene glycol 3350 17 Gram(s) Oral two times a day  pregabalin 25 milliGRAM(s) Oral two times a day  senna 2 Tablet(s) Oral at bedtime  sevelamer carbonate 1600 milliGRAM(s) Oral three times a day with meals  sodium chloride 0.9% lock flush 10 milliLiter(s) IV Push every 1 hour PRN      Labs:  CBC Full  -  ( 20 Apr 2022 07:13 )  WBC Count : 18.04 K/uL  RBC Count : 3.72 M/uL  Hemoglobin : 9.2 g/dL  Hematocrit : 31.2 %  Platelet Count - Automated : 283 K/uL  Mean Cell Volume : 83.9 fl  Mean Cell Hemoglobin : 24.7 pg  Mean Cell Hemoglobin Concentration : 29.5 gm/dL  Auto Neutrophil # : x  Auto Lymphocyte # : x  Auto Monocyte # : x  Auto Eosinophil # : x  Auto Basophil # : x  Auto Neutrophil % : x  Auto Lymphocyte % : x  Auto Monocyte % : x  Auto Eosinophil % : x  Auto Basophil % : x    04-19    133<L>  |  93<L>  |  39<H>  ----------------------------<  183<H>  4.0   |  22  |  5.36<H>    Ca    8.7      19 Apr 2022 23:51    TPro  7.3  /  Alb  2.5<L>  /  TBili  0.3  /  DBili  x   /  AST  13  /  ALT  6<L>  /  AlkPhos  193<H>  04-19    CAPILLARY BLOOD GLUCOSE      POCT Blood Glucose.: 191 mg/dL (20 Apr 2022 08:25)  POCT Blood Glucose.: 191 mg/dL (19 Apr 2022 21:29)  POCT Blood Glucose.: 189 mg/dL (19 Apr 2022 11:53)    LIVER FUNCTIONS - ( 19 Apr 2022 07:34 )  Alb: 2.5 g/dL / Pro: 7.3 g/dL / ALK PHOS: 193 U/L / ALT: 6 U/L / AST: 13 U/L / GGT: x           PT/INR - ( 19 Apr 2022 07:35 )   PT: 13.2 sec;   INR: 1.14 ratio         PTT - ( 20 Apr 2022 07:13 )  PTT:80.7 sec        Vitals:  Vital Signs Last 24 Hrs  T(C): 36.4 (20 Apr 2022 04:20), Max: 36.6 (19 Apr 2022 09:25)  T(F): 97.5 (20 Apr 2022 04:20), Max: 97.9 (19 Apr 2022 09:25)  HR: 79 (20 Apr 2022 04:20) (66 - 109)  BP: 163/76 (20 Apr 2022 06:20) (131/64 - 165/74)  BP(mean): --  RR: 18 (20 Apr 2022 04:20) (18 - 18)  SpO2: 96% (20 Apr 2022 04:20) (92% - 98%)    Allergies:  nitrofurantoin (Nephrotoxicity)    ROS: as per HPI.          NEUROLOGICAL EXAM:    Mental status: Awake, alert, and in no apparent distress. Oriented to person, place, year, and month but not day.  Language intact.  Attention impaired.  Patient tired, not very cooperative with examination     Cranial Nerves: Pupils were equal, round, reactive to light. Extraocular movements were intact. B BTT Facial sensation was intact to light touch. There was no facial asymmetry. The palate was upgoing symmetrically and tongue was midline. Hearing acuity was intact to finger rub AU. Shoulder shrug was full bilaterally    Motor exam: Bulk and tone were normal. No downward drift in arms.  both legs minimally antigravity though in part limited by edema, PVD, and neuropathy.  Fine finger movements were symmetric. There was bilateral symmetric pronator drift    Reflexes: DTRs depressed, flexor plantars.     Sensation: Intact to noxious, seems intact to light touch, due to decreased attention did not assess other modalities     Coordination: no gross dysmetria    Gait: deferred    Imaging:    ACC: [id number] EXAM:  CT BRAIN                        PROCEDURE DATE:  04/09/2022      INTERPRETATION:  CLINICAL INFORMATION:  Altered mental status, on   anticoagulation .    TECHNIQUE: Multiple contiguous axial images were acquired from the   skullbase to the vertex without the administration of intravenous   contrast.  Coronal and sagittal reformations were made.    COMPARISON: CT head 10/21/2021, brain MRI 02/23/2014.    FINDINGS:    Scattered lacunar infarcts in the bilateral cerebralhemispheres are   grossly stable compared to the 10/21/2021 head CT. No new additional   infarcts are noted over the time interval.    No acute intracranial hemorrhage, mass effect, or midline shift. The   brain demonstrates periventricular hypoattenuation, nonspecific in   etiology but likely representing chronic microvascular ischemic changes.    No abnormal extra-axial fluid collections are present.    The ventricles and sulci demonstrate age appropriate involutional   changes.  The basal cisterns are patent.    The orbits are unremarkable. The paranasal sinuses are clear. The mastoid   air cells are clear. The calvarium is intact.    IMPRESSION:    No acute intracranial abnormality is noted. If the patient has new and   persistent symptoms, consider short interval follow-up head CT or brain   MRI.    --- End of Report ---    GATITO VILLARREAL MD; Resident Radiologist  This document has been electronically signed.  JESSE CORONA MD; Attending Radiologist  This document has been electronically signed. Apr 9 2022  2:00PM

## 2022-04-20 NOTE — PROGRESS NOTE ADULT - SUBJECTIVE AND OBJECTIVE BOX
[facility]   [phone]  RENAL FOLLOW UP NOTE  --------------------------------------------------------------------------------  Chief Complaint:    24 hour events/subjective:    seen earlier in IR recovery suite   still sedated     PAST HISTORY  --------------------------------------------------------------------------------  No significant changes to PMH, PSH, FHx, SHx, unless otherwise noted    ALLERGIES & MEDICATIONS  --------------------------------------------------------------------------------  Allergies    nitrofurantoin (Nephrotoxicity)    Intolerances      Standing Inpatient Medications  allopurinol 100 milliGRAM(s) Oral daily  aspirin enteric coated 81 milliGRAM(s) Oral daily  atorvastatin 40 milliGRAM(s) Oral at bedtime  chlorhexidine 2% Cloths 1 Application(s) Topical <User Schedule>  chlorhexidine 4% Liquid 1 Application(s) Topical <User Schedule>  dextrose 50% Injectable 25 Gram(s) IV Push once  dextrose 50% Injectable 25 Gram(s) IV Push once  dextrose 50% Injectable 12.5 Gram(s) IV Push once  diltiazem    milliGRAM(s) Oral daily  heparin  Infusion 4200 Unit(s)/Hr IV Continuous <Continuous>  hydrALAZINE 25 milliGRAM(s) Oral three times a day  insulin lispro (ADMELOG) corrective regimen sliding scale   SubCutaneous at bedtime  lidocaine   4% Patch 1 Patch Transdermal daily  metoprolol succinate  milliGRAM(s) Oral daily  polyethylene glycol 3350 17 Gram(s) Oral two times a day  pregabalin 25 milliGRAM(s) Oral two times a day  senna 2 Tablet(s) Oral at bedtime  sevelamer carbonate 1600 milliGRAM(s) Oral three times a day with meals    PRN Inpatient Medications  bisacodyl 5 milliGRAM(s) Oral every 12 hours PRN  dextrose Oral Gel 15 Gram(s) Oral once PRN  sodium chloride 0.9% lock flush 10 milliLiter(s) IV Push every 1 hour PRN      REVIEW OF SYSTEMS  --------------------------------------------------------------------------------        VITALS/PHYSICAL EXAM  --------------------------------------------------------------------------------  T(C): 36.3 (04-20-22 @ 16:57), Max: 36.8 (04-20-22 @ 12:13)  HR: 93 (04-20-22 @ 16:57) (79 - 100)  BP: 119/68 (04-20-22 @ 16:57) (119/56 - 163/76)  RR: 18 (04-20-22 @ 16:57) (16 - 20)  SpO2: 94% (04-20-22 @ 16:57) (92% - 98%)  Wt(kg): --  Height (cm): 180.3 (04-20-22 @ 14:08)  Weight (kg): 120.9 (04-20-22 @ 14:08)  BMI (kg/m2): 37.2 (04-20-22 @ 14:08)  BSA (m2): 2.38 (04-20-22 @ 14:08)      04-19-22 @ 07:01  -  04-20-22 @ 07:00  --------------------------------------------------------  IN: 0 mL / OUT: 2500 mL / NET: -2500 mL      Physical Exam:  	        Gen:  still recovering from sedated  + R IJ cath   	Pulm: Decreased breath sounds b/l bases.  	CV: No JVD. IRR,    	Abd: +BS, soft, nontender, softly distended, obese               Extremity UE: increased warmth, R hand, increased warmth, , swollen              Extremity LE: + hyperpigmented bilateral LE with B/L non-pitting edema- improved, B/L LE tender on palpation              Vascular: R IJ HD catheter, L arm AVF wrapped with ACE bandage      LABS/STUDIES  --------------------------------------------------------------------------------              9.2    18.04 >-----------<  283      [04-20-22 @ 07:13]              31.2     131  |  94  |  46  ----------------------------<  186      [04-20-22 @ 07:13]  5.3   |  19  |  5.83        Ca     9.0     [04-20-22 @ 07:13]    TPro  7.3  /  Alb  2.5  /  TBili  0.3  /  DBili  x   /  AST  13  /  ALT  6   /  AlkPhos  193  [04-19-22 @ 07:34]    PT/INR: PT 13.2 , INR 1.14       [04-19-22 @ 07:35]  PTT: 80.7       [04-20-22 @ 07:13]      Creatinine Trend:  SCr 5.83 [04-20 @ 07:13]  SCr 5.36 [04-19 @ 23:51]  SCr 8.47 [04-19 @ 07:34]  SCr 6.75 [04-18 @ 04:57]  SCr 4.90 [04-17 @ 04:55]              Urinalysis - [04-03-22 @ 00:48]      Color Light Orange / Appearance Turbid / SG 1.021 / pH 5.5      Gluc Negative / Ketone Negative  / Bili Negative / Urobili Negative       Blood Large / Protein 300 mg/dL / Leuk Est Large / Nitrite Negative      RBC 20 /  / Hyaline 10 / Gran  / Sq Epi  / Non Sq Epi 3 / Bacteria Negative      Iron 16, TIBC 239, %sat 7      [04-15-22 @ 10:16]  Ferritin 172      [04-15-22 @ 09:05]  PTH -- (Ca 8.3)      [04-15-22 @ 12:18]   150  PTH -- (Ca --)      [04-03-22 @ 23:06]   97  HbA1c 11.7      [11-07-19 @ 09:14]  TSH 1.98      [04-05-22 @ 05:19]    DEREJE: titer Negative, pattern Negative      [04-11-22 @ 16:24]  C3 Complement 61      [04-11-22 @ 16:56]  C4 Complement 28      [04-11-22 @ 16:56]  ANCA: cANCA Negative, pANCA Negative, atypical ANCA Indeterminate Method interference due to DEREJE fluorescence      [04-11-22 @ 16:24]  anti-GBM 3      [04-11-22 @ 19:27]  Free Light Chains: kappa 16.92, lambda 12.63, ratio = 1.34      [04-11 @ 16:56]  Immunofixation Serum: No Monoclonal Band Identified    Reference Range: None Detected      [04-11-22 @ 16:56]  SPEP Interpretation: Normal Electrophoresis Pattern      [04-11-22 @ 16:56]

## 2022-04-20 NOTE — PROGRESS NOTE ADULT - SUBJECTIVE AND OBJECTIVE BOX
Follow-up Pulm Progress Note    No new respiratory events overnight.  Denies SOB/CP.   Sats 98% RA    Medications:  MEDICATIONS  (STANDING):  allopurinol 100 milliGRAM(s) Oral daily  atorvastatin 40 milliGRAM(s) Oral at bedtime  chlorhexidine 2% Cloths 1 Application(s) Topical <User Schedule>  dextrose 50% Injectable 25 Gram(s) IV Push once  dextrose 50% Injectable 25 Gram(s) IV Push once  dextrose 50% Injectable 12.5 Gram(s) IV Push once  diltiazem    milliGRAM(s) Oral daily  heparin  Infusion 4200 Unit(s)/Hr (42 mL/Hr) IV Continuous <Continuous>  hydrALAZINE 25 milliGRAM(s) Oral three times a day  insulin lispro (ADMELOG) corrective regimen sliding scale   SubCutaneous at bedtime  lidocaine   4% Patch 1 Patch Transdermal daily  metoprolol succinate  milliGRAM(s) Oral daily  polyethylene glycol 3350 17 Gram(s) Oral two times a day  pregabalin 25 milliGRAM(s) Oral two times a day  senna 2 Tablet(s) Oral at bedtime  sevelamer carbonate 1600 milliGRAM(s) Oral three times a day with meals    MEDICATIONS  (PRN):  bisacodyl 5 milliGRAM(s) Oral every 12 hours PRN Constipation  dextrose Oral Gel 15 Gram(s) Oral once PRN Blood Glucose LESS THAN 70 milliGRAM(s)/deciliter  sodium chloride 0.9% lock flush 10 milliLiter(s) IV Push every 1 hour PRN Pre/post blood products, medications, blood draw, and to maintain line patency          Vital Signs Last 24 Hrs  T(C): 36.8 (20 Apr 2022 12:13), Max: 36.8 (20 Apr 2022 12:13)  T(F): 98.2 (20 Apr 2022 12:13), Max: 98.2 (20 Apr 2022 12:13)  HR: 85 (20 Apr 2022 12:13) (66 - 109)  BP: 155/75 (20 Apr 2022 12:13) (131/64 - 165/74)  BP(mean): --  RR: 18 (20 Apr 2022 12:13) (18 - 18)  SpO2: 98% (20 Apr 2022 12:13) (93% - 98%)    ABG - ( 19 Apr 2022 23:00 )  pH, Arterial: 7.42  pH, Blood: x     /  pCO2: 37    /  pO2: 77    / HCO3: 24    / Base Excess: -0.2  /  SaO2: 97.7                  04-19 @ 07:01  -  04-20 @ 07:00  --------------------------------------------------------  IN: 0 mL / OUT: 2500 mL / NET: -2500 mL          LABS:                        9.2    18.04 )-----------( 283      ( 20 Apr 2022 07:13 )             31.2     04-20    131<L>  |  94<L>  |  46<H>  ----------------------------<  186<H>  5.3   |  19<L>  |  5.83<H>    Ca    9.0      20 Apr 2022 07:13    TPro  7.3  /  Alb  2.5<L>  /  TBili  0.3  /  DBili  x   /  AST  13  /  ALT  6<L>  /  AlkPhos  193<H>  04-19          CAPILLARY BLOOD GLUCOSE      POCT Blood Glucose.: 191 mg/dL (20 Apr 2022 08:25)    PT/INR - ( 19 Apr 2022 07:35 )   PT: 13.2 sec;   INR: 1.14 ratio         PTT - ( 20 Apr 2022 07:13 )  PTT:80.7 sec    Procalcitonin, Serum: 0.80 ng/mL (04-17-22 @ 16:50)      DEREJE Negative 04-11 @ 16:24  Anti SS-1 --  Anti SS-2 --  Anti RNP --  RF -- 04-11 @ 16:24    Atypical ANCA Indeterminate Method interference due to DEREJE fluorescence 04-11 @ 16:24  c-ANCA titer -- 04-11 @ 16:24  c-ANCA Negative 04-11 @ 16:24  p-ANCA Negative 04-11 @ 16:24            RADIOLOGY REVIEWED  CXR 4/11: unchanged b/l effusions, atelectasis    CT chest: < from: CT Chest No Cont (04.04.22 @ 16:52) >  FINDINGS:    AIRWAYS, LUNGS, PLEURA: Tracheal secretions. Small left greater than   right pleural effusions increased compared to 2/19/2022. Right-sided   pleural thickening. Lingular and left greater than right basilar   opacification, likely atelectasis.    MEDIASTINUM: Cardiomegaly. No pericardial effusion. Thoracic aorta normal   caliber.  No large mediastinal lymph nodes.    IMAGED ABDOMEN: Nonspecific perinephric stranding.    SOFT TISSUES: Unremarkable.    BONES: Unremarkable.      IMPRESSION:.    Small left greater than right bilateral pleural effusions increased in   size compared to 2/19/2022.    Lingular and left greater than right basilar opacification, likely   atelectasis.    --- End of Report ---    < end of copied text >

## 2022-04-20 NOTE — PROGRESS NOTE ADULT - PROBLEM SELECTOR PLAN 5
-ABG with no CO2 retention, sedating medications held  -? if 2nd to uremia, HD per renal.  -Mental status had improved. More confused on 4/19 - ABG with normal pH and no CO2 retention.

## 2022-04-20 NOTE — PROGRESS NOTE ADULT - PROBLEM SELECTOR PLAN 1
I Edward Pinto MD have personally  seen and examined the patient today and have noted the findings and formulated the plan of care.  I Edward Pinto MD have personally seen and examined the patient at bedside today at  4 pm

## 2022-04-20 NOTE — PROGRESS NOTE ADULT - ASSESSMENT
Impression:  67 year old gentleman pmhx CAD s/p MI/PCI/CABG, hx TIA, afib on pradaxa, HTN, DM2, PVD, gout, L CEA 2014, CKD admitted to ED with ANT, tremors, confusion and lethargy. Receiving new hemodialysis. CT head no acute changes. Mental status improved today. Pt denies any headaches, no slurred speech, no focal facial or limb weakness. Has chronic gout with bilateral finger swelling, pain.     Diagnosis:  Presentation consistent with toxic metabolic encephalopathy secondary to ANT/CKD improving with dialysis, CT head no acute changes, no focal findings on exam, no evidence of acute neurological event at this time.     Recommendations:  Continue HD as per renal  BUN improving, continue management of BUN  Continue supportive care per primary team and management of his medical issues.  Toxic/metabolic/infectious workup as per primary team   OOB / Physical therapy  Continue stroke prophylaxis- on AC for afib; no no neurological indication for concomitant antiplatelet unless there is non-neurological indication for its continued use; optimize hypertension/glycemic control  consider workup for MOR, can potentially be done inpatient vs outpatient, he is generally more confused in the mornings which can sometimes be seen in setting of sleep apnea  no further neuro inpt workup is needed

## 2022-04-20 NOTE — PROGRESS NOTE ADULT - SUBJECTIVE AND OBJECTIVE BOX
Vascular Surgery Progress Note      SUBJECTIVE: Patient seen and examined at bedside with surgical team, patient without complaints.     OBJECTIVE:    Vital Signs Last 24 Hrs  T(C): 36.4 (20 Apr 2022 04:20), Max: 36.5 (19 Apr 2022 11:25)  T(F): 97.5 (20 Apr 2022 04:20), Max: 97.7 (19 Apr 2022 11:25)  HR: 82 (20 Apr 2022 06:20) (66 - 109)  BP: 163/76 (20 Apr 2022 06:20) (131/64 - 165/74)  BP(mean): --  RR: 18 (20 Apr 2022 04:20) (18 - 18)  SpO2: 96% (20 Apr 2022 04:20) (93% - 98%)I&O's Detail    19 Apr 2022 07:01  -  20 Apr 2022 07:00  --------------------------------------------------------  IN:  Total IN: 0 mL    OUT:    Other (mL): 2500 mL  Total OUT: 2500 mL    Total NET: -2500 mL      MEDICATIONS  (STANDING):  allopurinol 100 milliGRAM(s) Oral daily  atorvastatin 40 milliGRAM(s) Oral at bedtime  chlorhexidine 2% Cloths 1 Application(s) Topical <User Schedule>  dextrose 50% Injectable 25 Gram(s) IV Push once  dextrose 50% Injectable 25 Gram(s) IV Push once  dextrose 50% Injectable 12.5 Gram(s) IV Push once  diltiazem    milliGRAM(s) Oral daily  heparin  Infusion 4200 Unit(s)/Hr (42 mL/Hr) IV Continuous <Continuous>  hydrALAZINE 25 milliGRAM(s) Oral three times a day  insulin lispro (ADMELOG) corrective regimen sliding scale   SubCutaneous at bedtime  lidocaine   4% Patch 1 Patch Transdermal daily  metoprolol succinate  milliGRAM(s) Oral daily  polyethylene glycol 3350 17 Gram(s) Oral two times a day  pregabalin 25 milliGRAM(s) Oral two times a day  senna 2 Tablet(s) Oral at bedtime  sevelamer carbonate 1600 milliGRAM(s) Oral three times a day with meals    MEDICATIONS  (PRN):  bisacodyl 5 milliGRAM(s) Oral every 12 hours PRN Constipation  dextrose Oral Gel 15 Gram(s) Oral once PRN Blood Glucose LESS THAN 70 milliGRAM(s)/deciliter  sodium chloride 0.9% lock flush 10 milliLiter(s) IV Push every 1 hour PRN Pre/post blood products, medications, blood draw, and to maintain line patency      PHYSICAL EXAM:  Constitutional: A&Ox3, NAD  Respiratory: Unlabored breathing  Abdomen: Soft, nondistended, NTTP. No rebound or guarding.  Extremities: LUE with 3 incisions with staples. + thrill.       LABS:                        9.2    18.04 )-----------( 283      ( 20 Apr 2022 07:13 )             31.2     04-20    131<L>  |  94<L>  |  46<H>  ----------------------------<  186<H>  5.3   |  19<L>  |  5.83<H>    Ca    9.0      20 Apr 2022 07:13    TPro  7.3  /  Alb  2.5<L>  /  TBili  0.3  /  DBili  x   /  AST  13  /  ALT  6<L>  /  AlkPhos  193<H>  04-19    PT/INR - ( 19 Apr 2022 07:35 )   PT: 13.2 sec;   INR: 1.14 ratio         PTT - ( 20 Apr 2022 07:13 )  PTT:80.7 sec  LIVER FUNCTIONS - ( 19 Apr 2022 07:34 )  Alb: 2.5 g/dL / Pro: 7.3 g/dL / ALK PHOS: 193 U/L / ALT: 6 U/L / AST: 13 U/L / GGT: x                 IMAGING:     Vascular Surgery Progress Note      SUBJECTIVE: Patient seen and examined at bedside with surgical team, patient without complaints.     OBJECTIVE:    Vital Signs Last 24 Hrs  T(C): 36.4 (20 Apr 2022 04:20), Max: 36.5 (19 Apr 2022 11:25)  T(F): 97.5 (20 Apr 2022 04:20), Max: 97.7 (19 Apr 2022 11:25)  HR: 82 (20 Apr 2022 06:20) (66 - 109)  BP: 163/76 (20 Apr 2022 06:20) (131/64 - 165/74)  BP(mean): --  RR: 18 (20 Apr 2022 04:20) (18 - 18)  SpO2: 96% (20 Apr 2022 04:20) (93% - 98%)I&O's Detail    19 Apr 2022 07:01  -  20 Apr 2022 07:00  --------------------------------------------------------  IN:  Total IN: 0 mL    OUT:    Other (mL): 2500 mL  Total OUT: 2500 mL    Total NET: -2500 mL      MEDICATIONS  (STANDING):  allopurinol 100 milliGRAM(s) Oral daily  atorvastatin 40 milliGRAM(s) Oral at bedtime  chlorhexidine 2% Cloths 1 Application(s) Topical <User Schedule>  dextrose 50% Injectable 25 Gram(s) IV Push once  dextrose 50% Injectable 25 Gram(s) IV Push once  dextrose 50% Injectable 12.5 Gram(s) IV Push once  diltiazem    milliGRAM(s) Oral daily  heparin  Infusion 4200 Unit(s)/Hr (42 mL/Hr) IV Continuous <Continuous>  hydrALAZINE 25 milliGRAM(s) Oral three times a day  insulin lispro (ADMELOG) corrective regimen sliding scale   SubCutaneous at bedtime  lidocaine   4% Patch 1 Patch Transdermal daily  metoprolol succinate  milliGRAM(s) Oral daily  polyethylene glycol 3350 17 Gram(s) Oral two times a day  pregabalin 25 milliGRAM(s) Oral two times a day  senna 2 Tablet(s) Oral at bedtime  sevelamer carbonate 1600 milliGRAM(s) Oral three times a day with meals    MEDICATIONS  (PRN):  bisacodyl 5 milliGRAM(s) Oral every 12 hours PRN Constipation  dextrose Oral Gel 15 Gram(s) Oral once PRN Blood Glucose LESS THAN 70 milliGRAM(s)/deciliter  sodium chloride 0.9% lock flush 10 milliLiter(s) IV Push every 1 hour PRN Pre/post blood products, medications, blood draw, and to maintain line patency      PHYSICAL EXAM:  Constitutional: A&Ox3, NAD  Respiratory: Unlabored breathing  Abdomen: Soft, nondistended, NTTP. No rebound or guarding.  Extremities: LUE with 3 incisions with staples. + thrill. in lue  avf/g hybrid       LABS:                        9.2    18.04 )-----------( 283      ( 20 Apr 2022 07:13 )             31.2     04-20    131<L>  |  94<L>  |  46<H>  ----------------------------<  186<H>  5.3   |  19<L>  |  5.83<H>    Ca    9.0      20 Apr 2022 07:13    TPro  7.3  /  Alb  2.5<L>  /  TBili  0.3  /  DBili  x   /  AST  13  /  ALT  6<L>  /  AlkPhos  193<H>  04-19    PT/INR - ( 19 Apr 2022 07:35 )   PT: 13.2 sec;   INR: 1.14 ratio         PTT - ( 20 Apr 2022 07:13 )  PTT:80.7 sec  LIVER FUNCTIONS - ( 19 Apr 2022 07:34 )  Alb: 2.5 g/dL / Pro: 7.3 g/dL / ALK PHOS: 193 U/L / ALT: 6 U/L / AST: 13 U/L / GGT: x                 IMAGING:

## 2022-04-20 NOTE — ASU PREOP CHECKLIST - AS TEMP SITE
Chelita Mckinley is a 16year old male who was brought in for this visit.   History was provided by the mother  HPI:   Patient presents with:  Cough: f/u from a week ago, cough, bloody mucus,     Cough for about 10 days  Initially had sore throat and oleg days    Patient/parent questions answered and states understanding of instructions  Reviewed return precautions. Results From Past 48 Hours:  No results found for this or any previous visit (from the past 48 hour(s)).     Orders Placed This Visit:  No or oral

## 2022-04-20 NOTE — PROGRESS NOTE ADULT - ASSESSMENT
A/P    66 y/o M with history of CAD, s/p multiple PCI, with most recent 2/22 PCI of LAD in setting of NSTEMI, on ASA only (pradaxa), chronic atrial fibrillation maintained on Pradaxa, essential HTN, type 2 DM previously on oral medications but on insulin, diabetic neuropathy with chronic RIGHT LE venous stasis changes>left, LEFT foot ulcer seen by podiatry, past endarterectomy LEFT CEA in 2014 presenting with 1 week of decreased urine output, cystitis with hematuria     #ANT on CKD, new HD  -oliguric renal failure in setting of ? UTI/cystitis, r/o obstruction ? secondary to abx   -worsened with diuretic use but unlikely due to hypovolemia alone from diuretic use   -hyperkalemia improved   -New HD for uremia, renal failure- sp RIJ shiley placed on 4/12 in IR.   -renal f/u -sp rrt 4/12 for uncontrolled bleeding sp  right groin shiley removal  - monitor h/h - stable  -s/p L AVG 4/18 -- vasc f/u   -IR following planning tunnelled HD catheter placement today - pt can proceed from a cv standpoint   -renal f/ u    #AMS/Lethargy  -sig improved post hd   -events noted last night - MS back to baseline   -likely 2/2 Uremic encephalopathy   -ABG noted  -med f/u   -MICU eval noted 4/5  -head ct ok    #Acute on chronic HFpEF  -remains overloaded but improved  -continue aggressive volume removal with HD  -now tolerating po - c/w  bb    #Chronic AF  -rates improved sp cardizem gtt    -c/w metoprolol succ 100 mg qd  -c/w dilt cd 120mg daily    -c/w hep gtt per - eventual oral ac     #HTN  -stable--- c/w meds   -increase hydral if bp remains elevated     #CAD, s/p PCI, most recent 2/2022  -stable, cont ASA, off plavix due to a/c indication  -statin     #R carotid stenosis  -cont med tx  -MRA noted with Greater than 75% stenosis of the right distal common carotid artery. Approximately 60% stenosis of the proximal left internal carotid artery.  -Continue ASA and Statin Therapy  -vasc outpt f/u Dr Pinto    ACP- Advanced Care Planning  -Advanced care planning discussed with patient. Advanced care planning forms discussed with patient and/or family.  Risks, benefits, and alternatives of medical/cardiac procedures were discussed in detail with all questions answered.  30 minutes were spent addressing advance care planning.         A/P    68 y/o M with history of CAD, s/p multiple PCI, with most recent 2/22 PCI of LAD in setting of NSTEMI, on ASA only (pradaxa), chronic atrial fibrillation maintained on Pradaxa, essential HTN, type 2 DM previously on oral medications but on insulin, diabetic neuropathy with chronic RIGHT LE venous stasis changes>left, LEFT foot ulcer seen by podiatry, past endarterectomy LEFT CEA in 2014 presenting with 1 week of decreased urine output, cystitis with hematuria     #ANT on CKD, new HD  -oliguric renal failure in setting of ? UTI/cystitis, r/o obstruction ? secondary to abx   -worsened with diuretic use but unlikely due to hypovolemia alone from diuretic use   -hyperkalemia improved   -New HD for uremia, renal failure- sp RIJ shiley placed on 4/12 in IR.   -renal f/u -sp rrt 4/12 for uncontrolled bleeding sp  right groin shiley removal  - monitor h/h - stable  -s/p L AVG 4/18 -- vasc f/u   -IR following planning tunnelled HD catheter placement today - pt can proceed from a cv standpoint   -renal f/ u    #AMS/Lethargy  -sig improved post hd   -events noted last night - MS back to baseline   -likely 2/2 Uremic encephalopathy   -ABG noted  -med f/u   -MICU eval noted 4/5  -head ct ok    #Acute on chronic HFpEF  -remains overloaded but improved  -continue aggressive volume removal with HD  -now tolerating po - c/w  bb    #Chronic AF  -rates improved sp cardizem gtt    -c/w metoprolol succ 100 mg qd  -c/w dilt cd 120mg daily    -c/w hep gtt per - eventual oral ac     #HTN  -stable--- c/w meds   -increase hydral if bp remains elevated     #CAD, s/p PCI, most recent 2/2022  -stable, off ASA for ir , off plavix due to a/c indication  -statin     #R carotid stenosis  -cont med tx  -MRA noted with Greater than 75% stenosis of the right distal common carotid artery. Approximately 60% stenosis of the proximal left internal carotid artery.  -Continue ASA and Statin Therapy  -vasc outpt f/u Dr Pinto    ACP- Advanced Care Planning  -Advanced care planning discussed with patient. Advanced care planning forms discussed with patient and/or family.  Risks, benefits, and alternatives of medical/cardiac procedures were discussed in detail with all questions answered.  30 minutes were spent addressing advance care planning.

## 2022-04-20 NOTE — PROGRESS NOTE ADULT - SUBJECTIVE AND OBJECTIVE BOX
DATE OF SERVICE: 04-20-22 @ 14:00  CHIEF COMPLAINT:Patient is a 67y old  Male who presents with a chief complaint of Decreased urine output for the past week, leg oedema (20 Apr 2022 13:38)    	        PAST MEDICAL & SURGICAL HISTORY:  HTN (hypertension), benign    HLD (hyperlipidemia)    DM type 2 (diabetes mellitus, type 2)    TIA (transient ischemic attack)    Atrial fibrillation    MI (myocardial infarction)  circa 2014 and 2022.    CAD (coronary artery disease)    Neuropathy    Stage 3 chronic kidney disease    2019 novel coronavirus disease (COVID-19)  12/2021.  Received the COVID-19 vaccine x 2 as of April 2022.    Status post angioplasty with stent  LULU x 3 2/7/2014    S/P carotid endarterectomy  left            weak  RESPIRATORY: No cough, wheezing, chills or hemoptysis; No Shortness of Breath  CARDIOVASCULAR: No chest pain, palpitations, passing out,   GASTROINTESTINAL: No abdominal or epigastric pain. No nausea, vomiting, or hematemesis;  GENITOURINARY: No dysuria, frequency, hematuria, or incontinence  NEUROLOGICAL: No headaches,  pain germaine ext     Medications:  MEDICATIONS  (STANDING):  allopurinol 100 milliGRAM(s) Oral daily  atorvastatin 40 milliGRAM(s) Oral at bedtime  chlorhexidine 2% Cloths 1 Application(s) Topical <User Schedule>  dextrose 50% Injectable 25 Gram(s) IV Push once  dextrose 50% Injectable 25 Gram(s) IV Push once  dextrose 50% Injectable 12.5 Gram(s) IV Push once  diltiazem    milliGRAM(s) Oral daily  heparin  Infusion 4200 Unit(s)/Hr (42 mL/Hr) IV Continuous <Continuous>  hydrALAZINE 25 milliGRAM(s) Oral three times a day  insulin lispro (ADMELOG) corrective regimen sliding scale   SubCutaneous at bedtime  lidocaine   4% Patch 1 Patch Transdermal daily  metoprolol succinate  milliGRAM(s) Oral daily  polyethylene glycol 3350 17 Gram(s) Oral two times a day  pregabalin 25 milliGRAM(s) Oral two times a day  senna 2 Tablet(s) Oral at bedtime  sevelamer carbonate 1600 milliGRAM(s) Oral three times a day with meals    MEDICATIONS  (PRN):  bisacodyl 5 milliGRAM(s) Oral every 12 hours PRN Constipation  dextrose Oral Gel 15 Gram(s) Oral once PRN Blood Glucose LESS THAN 70 milliGRAM(s)/deciliter  sodium chloride 0.9% lock flush 10 milliLiter(s) IV Push every 1 hour PRN Pre/post blood products, medications, blood draw, and to maintain line patency    	    PHYSICAL EXAM:  T(C): 36.6 (04-20-22 @ 13:44), Max: 36.8 (04-20-22 @ 12:13)  HR: 87 (04-20-22 @ 13:44) (66 - 109)  BP: 153/78 (04-20-22 @ 13:44) (131/64 - 165/74)  RR: 18 (04-20-22 @ 13:44) (18 - 18)  SpO2: 96% (04-20-22 @ 13:44) (93% - 98%)  Wt(kg): --  I&O's Summary    19 Apr 2022 07:01  -  20 Apr 2022 07:00  --------------------------------------------------------  IN: 0 mL / OUT: 2500 mL / NET: -2500 mL        Appearance: Normal	  HEENT:   Normal oral mucosa    Cardiovascular: irreg  Respiratory: Lungs clear to auscultation	  Psychiatry: A & O  Gastrointestinal:  Soft, Non-tender, + BS	    Neurologic: Non-focal  Extremities: lue av f    pvd  dec edema    TELEMETRY: 	    ECG:  	  RADIOLOGY:  OTHER: 	  	  LABS:	 	    CARDIAC MARKERS:                                9.2    18.04 )-----------( 283      ( 20 Apr 2022 07:13 )             31.2     04-20    131<L>  |  94<L>  |  46<H>  ----------------------------<  186<H>  5.3   |  19<L>  |  5.83<H>    Ca    9.0      20 Apr 2022 07:13    TPro  7.3  /  Alb  2.5<L>  /  TBili  0.3  /  DBili  x   /  AST  13  /  ALT  6<L>  /  AlkPhos  193<H>  04-19    proBNP:   Lipid Profile:   HgA1c:   TSH:

## 2022-04-20 NOTE — PROGRESS NOTE ADULT - ASSESSMENT
Patient is a 67 year old male with PMH of CAD with past multiple PCI, with most recent discharge from [facility] in Feb 2022 for NSTEMI with LULU of an 85% prox LAD lesion with patient on ASA and now off Plavix per cardiology (only for one month), chronic afib on Pradaxa, HTN, type 2 DM on insulin, diabetic neuropathy with chronic RIGHT LE venous stasis changes>left, LEFT foot ulcer seen by podiatry, past endarterectomy LEFT CEA in 2014 who presented with UTI with hematuria diagnosed at urgent care and now with decreased urine output.   Plan to take patient to OR Monday 4/18 for LUE AVF creation, possible graft placement.    Acute cystitis  Leukocytosis  Noted dark urine with hematuria, went to UC and was prescribed macrobid, had some improvement in urine color but then noticed decreased urine output with no voiding reported in last 2 days  UA here with pyuria -  with large LE, negative nitrites and no bacteria. UCx/BCx NGTD  Imaging: CTAP reviewed - no hydronephrosis, nephrolithiasis, or evidence of obstructive uropathy  S/p ceftriaxone x7 day course completed 4/9  C/w monitoring off Abx.   Trend temps/WBC--Elevated WBC noted. No obv localizing concerns for new or recurrent infection. Suspect this is reactive and not due to infection but rather demarginalization.     ANT on CKD3   - Nephrology following, appreciate recs   - s/p placement of shiley and initiation of HD   - pending permcath placement   - monitor Cr    - renally dose meds    - avoid nephrotoxic agents    B/l LE edema with chronic venous stasis  Acute on chronic HFpEF   - legs cool to touch, nontender, chronic changes with no sign of infection/cellulitis   - has wound on bottom of L foot - dry and does not appear infected either   - clinically overloaded, Cardiology and Nephrology following   - elevate lower extremities      DM2 with neuropathy   - Blood glucose management per primary team.    Ok to proceed w/ permacath placement via IR     Infectious Diseases will continue to follow. Please call with any questions.   Annie King M.D.  [facility], Division of Infectious Diseases [phone]

## 2022-04-20 NOTE — CHART NOTE - NSCHARTNOTEFT_GEN_A_CORE
Notified by  Indy that patient's Full AC heparin drip needed to be increased to a rate of 42ml/hr, the previous rate was 39ml/hr. and the institutions limit was 40ml/hr. Nursing staff administered a bolus of 5000 units however did not increase the rate.    Informed , the rate should be increased as we are following the Full AC normogram. Lead ACP provider called by  and regarding the above.    Discussed above with Dr. Corral Medical attending. At 0146 notified Indy , and Nurse Educator that provider spoke with Dr. Corral  that the heparin normogram is to be ordered as per the attending and that cardiology Doctor Colby Mazarieogs was called to inform him of this ongoing issue (no, answer left a voicemail for Cardiologist) the drip should infuse as ordered and normogram should be followed.    Nursing staff weighed patient and updated dosing weight.    Instructed by Nursing administration and education that heparin drip to be ordered as Patient specific instead of Full AC      Plan  Ordered patient specific Heparin drip per nursing administration kept same PTT range of the Full AC normogram 58-99 at 4200 units per hours established from previous Full AC  Follow up with cardiology regarding the above  Will endorse to primary day team, attending to follow    Amie Jaimes NP  [facility] [id number] Notified by  Indy that patient's Full AC heparin drip needed to be increased to a rate of 42ml/hr, the previous rate was 39ml/hr. and the IV pump guardrail limit was 40ml/hr. Nursing staff administered a bolus of 5000 units however did not increase the rate because the pump was unable to be overridden.    Informed , the rate should be increased as we are following the Full AC normogram. Lead ACP provider called by  and regarding the above.    Discussed above with Dr. Corral Medical attending. At 0146 notified Indy , and Nurse Educator that provider spoke with Dr. Corral  that the heparin normogram is to be ordered as per the attending and that cardiology Doctor Colby Mazariegos was called to inform him of this ongoing issue (no, answer left a voicemail for Cardiologist) the drip should infuse as ordered and normogram should be followed.    Nursing staff weighed patient and updated dosing weight.        Plan  Ordered patient specific Heparin drip following same PTT range of the Full AC normogram 58-99 at 4200 units per hours established from previous Full AC  Follow up with cardiology regarding the above  Will endorse to primary day team, attending to follow    Amie Jaimes NP  [facility] [id number]

## 2022-04-20 NOTE — PRE PROCEDURE NOTE - PRE PROCEDURE EVALUATION
Interventional Radiology Pre-Procedure Note    This is a 67y Male CAD, NSTEMI s/p 3 stents in 2014 (stents to LAD, proximal and distal LCx), ischemic cardiomyopathy, HTN for 10 years, T2DM for 26 years c/b peripheral neuropathy, CVA, TIA, Afib on Pradaxa, chronic RLE wound, L carotid stenosis s/p endarterectomy (2014) just recently admitted  to the [facility] on 2/19/2022 with acute onset chest pain for one day found to have an NSTEMI, CAD, s/p PCI in setting of increased AF rates off bb for 3 days and resolved chest pain, his CKMB peaked and Echo noted with EF 60%,normal LV function, mild TR, mild HI. He was s/p [facility] with 85% proximal LAD s/p balloon angioplasty and LULU. He presented to [facility] ED with decreased urine output over the week. [facility] for [facility]uria and was started on Nitrofurantoin. Pt is still taking Nitrofurantoin w/ 5 days left. He came to the ED due to worsening symptoms. Pt reports having a similar incident 4-5 years ago that resolved spontaneously. He reports increased leg edema Dyspnea worse on exertion. His baseline Serum creatinine is in the 2.0 to 2.3 mg/dl range. ANT with serum creatinine of 8.29 with bun 144 and k 5.5    Procedure: conversion of non-tunneled to tunneled HD catheter    Diagnosis/Indication: Patient is a 67y old  Male who presents with a chief complaint of Decreased urine output for the past week, leg oedema (20 Apr 2022 14:00)    PAST MEDICAL & SURGICAL HISTORY:  HTN (hypertension), benign    HLD (hyperlipidemia)    DM type 2 (diabetes mellitus, type 2)    TIA (transient ischemic attack)    Atrial fibrillation    MI (myocardial infarction)  circa 2014 and 2022.    CAD (coronary artery disease)    Neuropathy    Stage 3 chronic kidney disease    2019 novel coronavirus disease (COVID-19)  12/2021.  Received the COVID-19 vaccine x 2 as of April 2022.    Status post angioplasty with stent  LULU x 3 2/7/2014    S/P carotid endarterectomy  left       CBC Full  -  ( 20 Apr 2022 07:13 )  WBC Count : 18.04 K/uL  RBC Count : 3.72 M/uL  Hemoglobin : 9.2 g/dL  Hematocrit : 31.2 %  Platelet Count - Automated : 283 K/uL  Mean Cell Volume : 83.9 fl  Mean Cell Hemoglobin : 24.7 pg  Mean Cell Hemoglobin Concentration : 29.5 gm/dL      04-20    131<L>  |  94<L>  |  46<H>  ----------------------------<  186<H>  5.3   |  19<L>  |  5.83<H>    Ca    9.0      20 Apr 2022 07:13    TPro  7.3  /  Alb  2.5<L>  /  TBili  0.3  /  DBili  x   /  AST  13  /  ALT  6<L>  /  AlkPhos  193<H>  04-19    PT/INR - ( 19 Apr 2022 07:35 )   PT: 13.2 sec;   INR: 1.14 ratio       PTT - ( 20 Apr 2022 07:13 )  PTT:80.7 sec      Procedure/ risks/ benefits were explained, informed consent obtained from patient, verbalizes understanding.

## 2022-04-20 NOTE — PROGRESS NOTE ADULT - PROBLEM SELECTOR PLAN 1
Likely 2nd to b/l pl effusions, atelectasis  -Suspect fluid overload given elevated proBNP, LE edema on admission   -Keep O>I with HD as tolerated   -Pt with hx of hydroPTX. CT chest in 2/2022 with pleural thickening, atelectasis. Findings at the time suspected to be chronic. CT A/P 4/2 with small b/l pl effusions L>R, pleural thickening  -CT chest now with small b/l pl effusion R>L, bibasilar atelectasis  -SOB resolved  -Keep sats >90% with supplemental O2 as needed (currently RA)

## 2022-04-20 NOTE — PROGRESS NOTE ADULT - ASSESSMENT
67 year old gentleman with PMH of CAD, NSTEMI s/p 3 stents in 2014 (stents to LAD, proximal and distal LCx), ischemic cardiomyopathy, HTN for 10 years, T2DM for 26 years c/b peripheral neuropathy, CVA, TIA, Afib on Pradaxa, chronic RLE wound, L carotid stenosis s/p endarterectomy (2014) just recently admitted  to the [facility] on 2/19/2022 with acute onset chest pain for one day found to have an NSTEMI, CAD, s/p PCI in setting of increased AF rates off bb for 3 days and resolved chest pain, his CKMB peaked and Echo noted with EF 60%,normal LV function, mild TR, mild KS. He was s/p [facility] with 85% proximal LAD s/p balloon angioplasty and LULU. He presented to [facility] ED with decreased urine output over the week. Mr. tSevens went to [facility] for hematuria and was started  on Nitrofurantoin. Pt is still taking Nitrofurantoin w/ 5 days left. He came to the ED due to worsening symptoms. Pt reports having a similar incident 4-5 years ago that resolved spontaneously. He reports increased leg edema Dyspnea worse on exertion. his baseline Serum creatinine is in the 2.0 to 2.3 mg/dl range. ANT with serum creatinine of 8.29 with bun 144 and k 5.5        1- ANT on ckd III   2- chf chronic   3- hyperkalemia  on hd   4- uremia improving   5- hyperphosphatemia       hd am   IR placed permcath   renvela 2 tab with meals   anemia- epogen 01198 Units TIW with HD.  neuropathy  continue lyrica at 50 mg BID  PT  outpt hd arrangements vs rehab

## 2022-04-20 NOTE — PROGRESS NOTE ADULT - ASSESSMENT
Patient is a 67y M who is now several hours post-op from a L AVG. Recovering well, good thrill and incisions healing properly.     Plan:    - Pain control as needed  - Please paint Stapled incisions with betadine daily and can cover with dry gauze and tape. (left a provider to RN)  - Please call back vascular surgery  if any questions      Vascular Surgery  [phone] Patient is a 67y M who is now several hours post-op from a L AVG. Recovering well, good thrill and incisions healing properly.     Plan:    - Pain control as needed  - Please paint Stapled incisions with betadine daily and can cover with dry gauze and tape. (left a provider to RN)  - f/u ov in 2 weeks to re eval and d/c staples   reconsult prn     Vascular Surgery  [phone]

## 2022-04-20 NOTE — ASU PREOP CHECKLIST - IDENTIFICATION BAND VERIFIED
BOY LAGUNA   is a   53  female who is here for follow up. She has been doing well regarding anemia after iron transfusions. She has continued right sided throat complaints without much of a change from PPI. She did have her manometry which revealed ineffective esophageal motility and a small hiatal hernia. Her EGD with biopsies did reveal inflammation in the proximal and distal esophagus which suggests reflux esophagitis. She had wanted to taper off of PPI and transition to H2 blocker but was unable to so she is back on Prilosec 20 mg. She saw ENT recently for continued right sided throat complaints and was told that only finding was possible reflux injury. She has no new complaints. She is wondering what she should be eating.   done

## 2022-04-20 NOTE — PROGRESS NOTE ADULT - SUBJECTIVE AND OBJECTIVE BOX
CARDIOLOGY FOLLOW UP - Dr. Mazariegos  Date of Service: 4/20/22  CC: no cp/sob     Review of Systems:  Constitutional: No fever, weight loss, or fatigue  Respiratory: No cough, wheezing, or hemoptysis, no shortness of breath  Cardiovascular: No chest pain, palpitations, passing out, dizziness, or leg swelling  Gastrointestinal: No abd or epigastric pain.  No nausea, vomiting, or hematemesis; no diarrhea or constipation, no melena or hematochezia  Vascular: no edema       PHYSICAL EXAM:  T(C): 36.8 (04-20-22 @ 12:13), Max: 36.8 (04-20-22 @ 12:13)  HR: 85 (04-20-22 @ 12:13) (66 - 109)  BP: 155/75 (04-20-22 @ 12:13) (131/64 - 165/74)  RR: 18 (04-20-22 @ 12:13) (18 - 18)  SpO2: 98% (04-20-22 @ 12:13) (93% - 98%)  Wt(kg): --  I&O's Summary    19 Apr 2022 07:01  -  20 Apr 2022 07:00  --------------------------------------------------------  IN: 0 mL / OUT: 2500 mL / NET: -2500 mL        Appearance: Normal	  Cardiovascular: irreg , No JVD, No murmurs  Respiratory: Lungs clear to auscultation	  Gastrointestinal:  Soft, Non-tender, + BS	  Extremities: Normal range of motion, No clubbing, cyanosis or edema      Home Medications:  allopurinol 100 mg oral tablet: 1 tab(s) orally once a day (03 Apr 2022 06:34)  aspirin 81 mg oral delayed release tablet: 1 tab(s) orally once a day (03 Apr 2022 06:34)  bumetanide 2 mg oral tablet: 1 tab(s) orally once a day (03 Apr 2022 06:34)  insulin glargine 100 units/mL subcutaneous solution: 25 unit(s) subcutaneous once a day (at bedtime) (03 Apr 2022 06:34)  metOLazone 5 mg oral tablet: 1 tab(s) orally 3 times a week (03 Apr 2022 06:34)  metoprolol succinate 50 mg oral tablet, extended release: 2 tab(s) orally once a day (03 Apr 2022 06:34)  NovoLOG 100 units/mL subcutaneous solution: subcutaneous 4 times a day (before meals and at bedtime) (03 Apr 2022 06:34)  NovoLOG FlexPen 100 units/mL injectable solution: 10 unit(s) subcutaneous 3 times a day (03 Apr 2022 06:34)  oxycodone-acetaminophen 5 mg-325 mg oral tablet: 1 tab(s) orally 2 times a day (03 Apr 2022 06:34)  Pradaxa 150 mg oral capsule: 1 cap(s) orally 2 times a day (03 Apr 2022 06:34)  pregabalin 100 mg oral capsule: 1 cap(s) orally 3 times a day (03 Apr 2022 06:34)      MEDICATIONS  (STANDING):  allopurinol 100 milliGRAM(s) Oral daily  atorvastatin 40 milliGRAM(s) Oral at bedtime  chlorhexidine 2% Cloths 1 Application(s) Topical <User Schedule>  dextrose 50% Injectable 25 Gram(s) IV Push once  dextrose 50% Injectable 25 Gram(s) IV Push once  dextrose 50% Injectable 12.5 Gram(s) IV Push once  diltiazem    milliGRAM(s) Oral daily  heparin  Infusion 4200 Unit(s)/Hr (42 mL/Hr) IV Continuous <Continuous>  hydrALAZINE 25 milliGRAM(s) Oral three times a day  insulin lispro (ADMELOG) corrective regimen sliding scale   SubCutaneous at bedtime  lidocaine   4% Patch 1 Patch Transdermal daily  metoprolol succinate  milliGRAM(s) Oral daily  polyethylene glycol 3350 17 Gram(s) Oral two times a day  pregabalin 25 milliGRAM(s) Oral two times a day  senna 2 Tablet(s) Oral at bedtime  sevelamer carbonate 1600 milliGRAM(s) Oral three times a day with meals      TELEMETRY: afib 80-100s 	    ECG:  	  RADIOLOGY:   DIAGNOSTIC TESTING:  [ ] Echocardiogram:  [ ]  Catheterization:  [ ] Stress Test:    OTHER: 	    LABS:	 	                            9.2    18.04 )-----------( 283      ( 20 Apr 2022 07:13 )             31.2     04-20    131<L>  |  94<L>  |  46<H>  ----------------------------<  186<H>  5.3   |  19<L>  |  5.83<H>    Ca    9.0      20 Apr 2022 07:13    TPro  7.3  /  Alb  2.5<L>  /  TBili  0.3  /  DBili  x   /  AST  13  /  ALT  6<L>  /  AlkPhos  193<H>  04-19    PT/INR - ( 19 Apr 2022 07:35 )   PT: 13.2 sec;   INR: 1.14 ratio         PTT - ( 20 Apr 2022 07:13 )  PTT:80.7 sec

## 2022-04-21 NOTE — PROVIDER CONTACT NOTE (CRITICAL VALUE NOTIFICATION) - ACTION/TREATMENT ORDERED:
Heparin titration guidelines followed.
Heparin is discontinued. Will continue to monitor.
follow heparin nomogram.
PA aware. Will follow heparin Protocol. Will continue to monitor.
[facility]

## 2022-04-21 NOTE — PROGRESS NOTE ADULT - ASSESSMENT
·  Problem: Acute kidney injury superimposed on CKD. pt currently on hd  to cont as per renal  avf done luext   perm cath done  medically stable       Lethargy resolved  - CT head no acute event       Problem/Plan - 2:  ·  Problem: Chronic atrial fibrillation. c/w a/c  cards f/u     Problem/Plan - 3:  ·  Problem: Cystitis.   treatment as per id  s/p abs     Problem/Plan - 4:  ·  Problem: Type 2 diabetes mellitus. c/w insulin   endo f/u     Problem/Plan - 5:  ·  Problem: CAD (coronary artery disease).   stable  cards f/u       s/p shiley removal / groin w/ bleeding resolved      constipation better  bowel regimen  enema  lactulose    gouty pain  better      neuropathy  inc lyrica

## 2022-04-21 NOTE — OCCUPATIONAL THERAPY INITIAL EVALUATION ADULT - PRECAUTIONS/LIMITATIONS, REHAB EVAL
Hospital course c/b RRT 4/12 for uncontrolled bleeding sp  right groin shiley removal. AMS/lethargy, CT head no acute event./fall precautions

## 2022-04-21 NOTE — OCCUPATIONAL THERAPY INITIAL EVALUATION ADULT - NS ASR FOLLOW COMMAND OT EVAL
requires increased time to answer questions & repetition of directions throughout./25% of the time/able to follow single-step instructions

## 2022-04-21 NOTE — OCCUPATIONAL THERAPY INITIAL EVALUATION ADULT - ANTICIPATED DISCHARGE DISPOSITION, OT EVAL
MACARIO. IF pt went home at this time, would require assist for ALL ADL/transfers/mobility & home OT to address ADL/IADL, AE/DME, and fall prevention.

## 2022-04-21 NOTE — PROGRESS NOTE ADULT - ASSESSMENT
Patient is a 67 year old male with PMH of CAD with past multiple PCI, with most recent discharge from [facility] in Feb 2022 for NSTEMI with LULU of an 85% prox LAD lesion with patient on ASA and now off Plavix per cardiology (only for one month), chronic afib on Pradaxa, HTN, type 2 DM on insulin, diabetic neuropathy with chronic RIGHT LE venous stasis changes>left, LEFT foot ulcer seen by podiatry, past endarterectomy LEFT CEA in 2014 who presented with UTI with hematuria diagnosed at urgent care and now with decreased urine output.   Plan to take patient to OR Monday 4/18 for LUE AVF creation, possible graft placement.    Acute cystitis  Leukocytosis  Noted dark urine with hematuria, went to UC and was prescribed macrobid, had some improvement in urine color but then noticed decreased urine output with no voiding reported in last 2 days  UA here with pyuria -  with large LE, negative nitrites and no bacteria. UCx/BCx NGTD  Imaging: CTAP reviewed - no hydronephrosis, nephrolithiasis, or evidence of obstructive uropathy  S/p ceftriaxone x7 day course completed 4/9  C/w monitoring off Abx.   Trend temps/WBC--Elevated WBC noted. No obv localizing concerns for new or recurrent infection. Suspect this is reactive and not due to infection but rather demarginalization.     ANT on CKD3   - Nephrology following, appreciate recs   - s/p placement of shiley and initiation of HD   - pending permcath placement   - monitor Cr    - renally dose meds    - avoid nephrotoxic agents    B/l LE edema with chronic venous stasis  Acute on chronic HFpEF   - legs cool to touch, nontender, chronic changes with no sign of infection/cellulitis   - has wound on bottom of L foot - dry and does not appear infected either   - clinically overloaded, Cardiology and Nephrology following   - elevate lower extremities      DM2 with neuropathy   - Blood glucose management per primary team.    Infectious Diseases will continue to follow. Please call with any questions.   Annie King M.D.  [facility], Division of Infectious Diseases [phone]     Patient is a 67 year old male with PMH of CAD with past multiple PCI, with most recent discharge from [facility] in Feb 2022 for NSTEMI with LULU of an 85% prox LAD lesion with patient on ASA and now off Plavix per cardiology (only for one month), chronic afib on Pradaxa, HTN, type 2 DM on insulin, diabetic neuropathy with chronic RIGHT LE venous stasis changes>left, LEFT foot ulcer seen by podiatry, past endarterectomy LEFT CEA in 2014 who presented with UTI with hematuria diagnosed at urgent care and now with decreased urine output.   Plan to take patient to OR Monday 4/18 for LUE AVF creation, possible graft placement.    Acute cystitis  Leukocytosis  Noted dark urine with hematuria, went to UC and was prescribed macrobid, had some improvement in urine color but then noticed decreased urine output with no voiding reported in last 2 days  UA here with pyuria -  with large LE, negative nitrites and no bacteria. UCx/BCx NGTD  Imaging: CTAP reviewed - no hydronephrosis, nephrolithiasis, or evidence of obstructive uropathy  S/p ceftriaxone x7 day course completed 4/9  C/w monitoring off Abx.   Trend temps/WBC--Elevated WBC noted. No obv localizing concerns for new or recurrent infection. Suspect this is reactive and not due to infection but rather demarginalization.     ANT on CKD3   - Nephrology following, appreciate recs   - s/p placement of shiley and initiation of HD   - permcath placement 4/20   - monitor Cr    - renally dose meds    - avoid nephrotoxic agents    B/l LE edema with chronic venous stasis  Acute on chronic HFpEF   - legs cool to touch, nontender, chronic changes with no sign of infection/cellulitis   - has wound on bottom of L foot - dry and does not appear infected either   - clinically overloaded, Cardiology and Nephrology following   - elevate lower extremities      DM2 with neuropathy   - Blood glucose management per primary team.    Infectious Diseases will continue to follow. Please call with any questions.   Annie King M.D.  [facility], Division of Infectious Diseases [phone]

## 2022-04-21 NOTE — OCCUPATIONAL THERAPY INITIAL EVALUATION ADULT - DIAGNOSIS, OT EVAL
Pt presents with pain, decreased command following, impaired cognition, decreased ROM, strength, endurance, balance, and coordination, all impacting ability to perform ADLs and functional mobility.

## 2022-04-21 NOTE — PROGRESS NOTE ADULT - SUBJECTIVE AND OBJECTIVE BOX
[facility], Division of Infectious Diseases  ADRIANA Seals Y. Patel, S. Shah, G. [facility]  [phone]    Name: DYLAN DEL CASTILLO  Age: 67y  Gender: Male  MRN: [id number]    Interval History:  Patient seen and examined at bedside this morning  No acute overnight events. Afebrile  Daughter at bedside  Notes reviewed    Antibiotics:      Medications:  allopurinol 100 milliGRAM(s) Oral daily  aspirin enteric coated 81 milliGRAM(s) Oral daily  atorvastatin 40 milliGRAM(s) Oral at bedtime  bisacodyl 5 milliGRAM(s) Oral every 12 hours PRN  chlorhexidine 2% Cloths 1 Application(s) Topical <User Schedule>  chlorhexidine 4% Liquid 1 Application(s) Topical <User Schedule>  dextrose 50% Injectable 25 Gram(s) IV Push once  dextrose 50% Injectable 25 Gram(s) IV Push once  dextrose 50% Injectable 12.5 Gram(s) IV Push once  dextrose Oral Gel 15 Gram(s) Oral once PRN  diltiazem    milliGRAM(s) Oral daily  epoetin naz-epbx (RETACRIT) Injectable 88665 Unit(s) IV Push once  heparin  Infusion 4200 Unit(s)/Hr IV Continuous <Continuous>  hydrALAZINE 25 milliGRAM(s) Oral three times a day  insulin lispro (ADMELOG) corrective regimen sliding scale   SubCutaneous at bedtime  insulin lispro (ADMELOG) corrective regimen sliding scale   SubCutaneous three times a day before meals  lidocaine   4% Patch 1 Patch Transdermal daily  metoprolol succinate  milliGRAM(s) Oral daily  polyethylene glycol 3350 17 Gram(s) Oral two times a day  pregabalin 25 milliGRAM(s) Oral two times a day  senna 2 Tablet(s) Oral at bedtime  sevelamer carbonate 1600 milliGRAM(s) Oral three times a day with meals  sodium chloride 0.9% lock flush 10 milliLiter(s) IV Push every 1 hour PRN      Review of Systems:  unable to obtain  Allergies: nitrofurantoin (Nephrotoxicity)    For details regarding the patient's past medical history, social history, family history, and other miscellaneous elements, please refer the initial infectious diseases consultation and/or the admitting history and physical examination for this admission.    Objective:  Vitals:   T(C): 36.6 (04-21-22 @ 04:20), Max: 36.8 (04-20-22 @ 14:07)  HR: 76 (04-21-22 @ 04:20) (76 - 100)  BP: 147/70 (04-21-22 @ 04:20) (119/56 - 153/78)  RR: 18 (04-21-22 @ 04:20) (16 - 20)  SpO2: 95% (04-21-22 @ 04:20) (92% - 97%)    Physical Examination: deferred by patient      Laboratory Studies:  CBC:                       8.5    14.72 )-----------( 278      ( 21 Apr 2022 06:51 )             28.2     CMP: 04-21    132<L>  |  92<L>  |  69<H>  ----------------------------<  192<H>  4.8   |  20<L>  |  7.42<H>    Ca    8.6      21 Apr 2022 06:50            Microbiology: reviewed      Radiology: reviewed       Pulmonology

## 2022-04-21 NOTE — PROGRESS NOTE ADULT - SUBJECTIVE AND OBJECTIVE BOX
Interventional Radiology Follow-Up Note.     Patient seen and examined @ bedside around 7am.     This is a 67y Male s/p tunneled HD catheter on 4/20 in Interventional Radiology.   No complaint offered.      Medication:     diltiazem   CD: (04-21)  heparin  Infusion: (04-20)  heparin  Infusion: (04-20)  heparin  Infusion.: (04-20)  hydrALAZINE: (04-21)  metoprolol succinate ER: (04-21)    Vitals:   T(F): 97.8, Max: 98.2 (12:13)  HR: 76  BP: 147/70  RR: 18  SpO2: 95%    Physical Exam:  General: Nontoxic, in NAD.  Neck: right neck HD catheter site dressing c/d/i. No hematoma noted. No ttp            LABS:  Na: 132 (04-21 @ 06:50), 131 (04-20 @ 07:13), 133 (04-19 @ 23:51), 128 (04-19 @ 07:34)  K: 4.8 (04-21 @ 06:50), 5.3 (04-20 @ 07:13), 4.0 (04-19 @ 23:51), 5.6 (04-19 @ 07:34)  Cl: 92 (04-21 @ 06:50), 94 (04-20 @ 07:13), 93 (04-19 @ 23:51), 87 (04-19 @ 07:34)  CO2: 20 (04-21 @ 06:50), 19 (04-20 @ 07:13), 22 (04-19 @ 23:51), 16 (04-19 @ 07:34)  BUN: 69 (04-21 @ 06:50), 46 (04-20 @ 07:13), 39 (04-19 @ 23:51), 67 (04-19 @ 07:34)  Cr: 7.42 (04-21 @ 06:50), 5.83 (04-20 @ 07:13), 5.36 (04-19 @ 23:51), 8.47 (04-19 @ 07:34)  Glu: 192(04-21 @ 06:50), 186(04-20 @ 07:13), 183(04-19 @ 23:51), 238(04-19 @ 07:34)    Hgb: 8.5 (04-21 @ 06:51), 9.2 (04-20 @ 07:13), 8.2 (04-19 @ 23:51), 8.9 (04-19 @ 07:35)  Hct: 28.2 (04-21 @ 06:51), 31.2 (04-20 @ 07:13), 27.3 (04-19 @ 23:51), 29.4 (04-19 @ 07:35)  WBC: 14.72 (04-21 @ 06:51), 18.04 (04-20 @ 07:13), 18.15 (04-19 @ 23:51), 15.10 (04-19 @ 07:35)  Plt: 278 (04-21 @ 06:51), 283 (04-20 @ 07:13), 290 (04-19 @ 23:51), 321 (04-19 @ 07:35)    INR: 1.14 04-19-22 @ 07:35  PTT: 80.7 04-20-22 @ 07:13, 44.0 04-19-22 @ 23:51, 21.5 04-19-22 @ 07:35, 29.5 04-18-22 @ 19:33, 29.8 04-18-22 @ 10:39                     Assessment/Plan:  67y Male  ESRD s/p tunneled HD catheter on 4/20.     - Okay to use catheter.  - IR will sign off.     Please call IR at  [phone] with any questions, concerns, or issues regarding above.    Also available on Teams.

## 2022-04-21 NOTE — PROGRESS NOTE ADULT - SUBJECTIVE AND OBJECTIVE BOX
Follow-up Pulm Progress Note    No new respiratory events overnight.  Denies SOB/CP.     Medications:  MEDICATIONS  (STANDING):  allopurinol 100 milliGRAM(s) Oral daily  aspirin enteric coated 81 milliGRAM(s) Oral daily  atorvastatin 40 milliGRAM(s) Oral at bedtime  chlorhexidine 2% Cloths 1 Application(s) Topical <User Schedule>  chlorhexidine 4% Liquid 1 Application(s) Topical <User Schedule>  dextrose 50% Injectable 25 Gram(s) IV Push once  dextrose 50% Injectable 25 Gram(s) IV Push once  dextrose 50% Injectable 12.5 Gram(s) IV Push once  diltiazem    milliGRAM(s) Oral daily  heparin  Infusion 4200 Unit(s)/Hr (42 mL/Hr) IV Continuous <Continuous>  hydrALAZINE 25 milliGRAM(s) Oral three times a day  insulin lispro (ADMELOG) corrective regimen sliding scale   SubCutaneous at bedtime  insulin lispro (ADMELOG) corrective regimen sliding scale   SubCutaneous three times a day before meals  lidocaine   4% Patch 1 Patch Transdermal daily  metoprolol succinate  milliGRAM(s) Oral daily  polyethylene glycol 3350 17 Gram(s) Oral two times a day  pregabalin 25 milliGRAM(s) Oral two times a day  senna 2 Tablet(s) Oral at bedtime  sevelamer carbonate 1600 milliGRAM(s) Oral three times a day with meals    MEDICATIONS  (PRN):  bisacodyl 5 milliGRAM(s) Oral every 12 hours PRN Constipation  dextrose Oral Gel 15 Gram(s) Oral once PRN Blood Glucose LESS THAN 70 milliGRAM(s)/deciliter  sodium chloride 0.9% lock flush 10 milliLiter(s) IV Push every 1 hour PRN Pre/post blood products, medications, blood draw, and to maintain line patency          Vital Signs Last 24 Hrs  T(C): 36.6 (21 Apr 2022 04:20), Max: 36.8 (20 Apr 2022 12:13)  T(F): 97.8 (21 Apr 2022 04:20), Max: 98.2 (20 Apr 2022 12:13)  HR: 76 (21 Apr 2022 04:20) (76 - 100)  BP: 147/70 (21 Apr 2022 04:20) (119/56 - 155/75)  BP(mean): 85 (20 Apr 2022 16:40) (74 - 101)  RR: 18 (21 Apr 2022 04:20) (16 - 20)  SpO2: 95% (21 Apr 2022 04:20) (92% - 98%)    ABG - ( 19 Apr 2022 23:00 )  pH, Arterial: 7.42  pH, Blood: x     /  pCO2: 37    /  pO2: 77    / HCO3: 24    / Base Excess: -0.2  /  SaO2: 97.7                  04-20 @ 07:01  -  04-21 @ 07:00  --------------------------------------------------------  IN: 240 mL / OUT: 0 mL / NET: 240 mL          LABS:                        8.5    14.72 )-----------( 278      ( 21 Apr 2022 06:51 )             28.2     04-21    132<L>  |  92<L>  |  69<H>  ----------------------------<  192<H>  4.8   |  20<L>  |  7.42<H>    Ca    8.6      21 Apr 2022 06:50            CAPILLARY BLOOD GLUCOSE      POCT Blood Glucose.: 187 mg/dL (21 Apr 2022 08:11)    PTT - ( 21 Apr 2022 06:51 )  PTT:63.2 sec        DEREJE Negative 04-11 @ 16:24  Anti SS-1 --  Anti SS-2 --  Anti RNP --  RF -- 04-11 @ 16:24    Atypical ANCA Indeterminate Method interference due to DEREJE fluorescence 04-11 @ 16:24  c-ANCA titer -- 04-11 @ 16:24  c-ANCA Negative 04-11 @ 16:24  p-ANCA Negative 04-11 @ 16:24            RADIOLOGY REVIEWED  CXR 4/11: unchanged b/l effusions, atelectasis    CT chest: < from: CT Chest No Cont (04.04.22 @ 16:52) >  FINDINGS:    AIRWAYS, LUNGS, PLEURA: Tracheal secretions. Small left greater than   right pleural effusions increased compared to 2/19/2022. Right-sided   pleural thickening. Lingular and left greater than right basilar   opacification, likely atelectasis.    MEDIASTINUM: Cardiomegaly. No pericardial effusion. Thoracic aorta normal   caliber.  No large mediastinal lymph nodes.    IMAGED ABDOMEN: Nonspecific perinephric stranding.    SOFT TISSUES: Unremarkable.    BONES: Unremarkable.      IMPRESSION:.    Small left greater than right bilateral pleural effusions increased in   size compared to 2/19/2022.    Lingular and left greater than right basilar opacification, likely   atelectasis.    --- End of Report ---    < end of copied text >

## 2022-04-21 NOTE — PROGRESS NOTE ADULT - SUBJECTIVE AND OBJECTIVE BOX
[facility]   [phone]  RENAL FOLLOW UP NOTE  --------------------------------------------------------------------------------  Chief Complaint:    24 hour events/subjective:    patient seen and examined with Dr. Art duncan cp  c/o B/l LE neuropathy    PAST HISTORY  --------------------------------------------------------------------------------  No significant changes to PMH, PSH, FHx, SHx, unless otherwise noted    ALLERGIES & MEDICATIONS  --------------------------------------------------------------------------------  Allergies    nitrofurantoin (Nephrotoxicity)    Intolerances      Standing Inpatient Medications  allopurinol 100 milliGRAM(s) Oral daily  aspirin enteric coated 81 milliGRAM(s) Oral daily  atorvastatin 40 milliGRAM(s) Oral at bedtime  chlorhexidine 2% Cloths 1 Application(s) Topical <User Schedule>  chlorhexidine 4% Liquid 1 Application(s) Topical <User Schedule>  dextrose 50% Injectable 25 Gram(s) IV Push once  dextrose 50% Injectable 25 Gram(s) IV Push once  dextrose 50% Injectable 12.5 Gram(s) IV Push once  diltiazem    milliGRAM(s) Oral daily  epoetin naz-epbx (RETACRIT) Injectable 81982 Unit(s) IV Push once  heparin  Infusion 4200 Unit(s)/Hr IV Continuous <Continuous>  hydrALAZINE 25 milliGRAM(s) Oral three times a day  insulin lispro (ADMELOG) corrective regimen sliding scale   SubCutaneous at bedtime  insulin lispro (ADMELOG) corrective regimen sliding scale   SubCutaneous three times a day before meals  lidocaine   4% Patch 1 Patch Transdermal daily  metoprolol succinate  milliGRAM(s) Oral daily  polyethylene glycol 3350 17 Gram(s) Oral two times a day  pregabalin 25 milliGRAM(s) Oral two times a day  senna 2 Tablet(s) Oral at bedtime  sevelamer carbonate 1600 milliGRAM(s) Oral three times a day with meals    PRN Inpatient Medications  bisacodyl 5 milliGRAM(s) Oral every 12 hours PRN  dextrose Oral Gel 15 Gram(s) Oral once PRN  sodium chloride 0.9% lock flush 10 milliLiter(s) IV Push every 1 hour PRN      REVIEW OF SYSTEMS  --------------------------------------------------------------------------------    Gen: denies fevers/chills,  CVS: denies chest pain/palpitations  Resp: denies SOB/Cough  GI: Denies N/V/Abd pain  : Denies dysuria    VITALS/PHYSICAL EXAM  --------------------------------------------------------------------------------  T(C): 36.6 (04-21-22 @ 04:20), Max: 36.8 (04-20-22 @ 14:07)  HR: 76 (04-21-22 @ 04:20) (76 - 100)  BP: 147/70 (04-21-22 @ 04:20) (119/56 - 153/78)  RR: 18 (04-21-22 @ 04:20) (16 - 20)  SpO2: 95% (04-21-22 @ 04:20) (92% - 97%)  Wt(kg): --  Height (cm): 180.3 (04-20-22 @ 14:08)  Weight (kg): 120.9 (04-20-22 @ 14:08)  BMI (kg/m2): 37.2 (04-20-22 @ 14:08)  BSA (m2): 2.38 (04-20-22 @ 14:08)      04-20-22 @ 07:01  -  04-21-22 @ 07:00  --------------------------------------------------------  IN: 240 mL / OUT: 0 mL / NET: 240 mL      Physical Exam:  	              Gen: Appears comfortable  	Pulm: Decreased breath sounds b/l bases.  	CV: No JVD. IRR,    	Abd: +BS, soft, nontender, softly distended, obese               Extremity UE: increased warmth, R hand, increased warmth, , swollen              Extremity LE: + hyperpigmented bilateral LE with B/L non-pitting edema- improved, B/L LE tender on palpation              Vascular: R IJ HD catheter, L arm AVF wrapped with ACE bandage    LABS/STUDIES  --------------------------------------------------------------------------------              8.5    14.72 >-----------<  278      [04-21-22 @ 06:51]              28.2     132  |  92  |  69  ----------------------------<  192      [04-21-22 @ 06:50]  4.8   |  20  |  7.42        Ca     8.6     [04-21-22 @ 06:50]        PTT: 63.2       [04-21-22 @ 06:51]      Creatinine Trend:  SCr 7.42 [04-21 @ 06:50]  SCr 5.83 [04-20 @ 07:13]  SCr 5.36 [04-19 @ 23:51]  SCr 8.47 [04-19 @ 07:34]  SCr 6.75 [04-18 @ 04:57]              Urinalysis - [04-03-22 @ 00:48]      Color Light Orange / Appearance Turbid / SG 1.021 / pH 5.5      Gluc Negative / Ketone Negative  / Bili Negative / Urobili Negative       Blood Large / Protein 300 mg/dL / Leuk Est Large / Nitrite Negative      RBC 20 /  / Hyaline 10 / Gran  / Sq Epi  / Non Sq Epi 3 / Bacteria Negative      Iron 16, TIBC 239, %sat 7      [04-15-22 @ 10:16]  Ferritin 172      [04-15-22 @ 09:05]  PTH -- (Ca 8.3)      [04-15-22 @ 12:18]   150  PTH -- (Ca --)      [04-03-22 @ 23:06]   97  HbA1c 11.7      [11-07-19 @ 09:14]  TSH 1.98      [04-05-22 @ 05:19]    DEREJE: titer Negative, pattern Negative      [04-11-22 @ 16:24]  C3 Complement 61      [04-11-22 @ 16:56]  C4 Complement 28      [04-11-22 @ 16:56]  ANCA: cANCA Negative, pANCA Negative, atypical ANCA Indeterminate Method interference due to DEREJE fluorescence      [04-11-22 @ 16:24]  anti-GBM 3      [04-11-22 @ 19:27]  Free Light Chains: kappa 16.92, lambda 12.63, ratio = 1.34      [04-11 @ 16:56]  Immunofixation Serum: No Monoclonal Band Identified    Reference Range: None Detected      [04-11-22 @ 16:56]  SPEP Interpretation: Normal Electrophoresis Pattern      [04-11-22 @ 16:56]

## 2022-04-21 NOTE — OCCUPATIONAL THERAPY INITIAL EVALUATION ADULT - PERSONAL SAFETY AND JUDGMENT, REHAB EVAL
Pt distractible and requires redirection. Pt yelling when changing bed position 2/2 ?pain, ?fear of falling, and ?delirium/impaired

## 2022-04-21 NOTE — PROGRESS NOTE ADULT - SUBJECTIVE AND OBJECTIVE BOX
DATE OF SERVICE: 04-21-22 @ 13:49  CHIEF COMPLAINT:Patient is a 67y old  Male who presents with a chief complaint of Decreased urine output for the past week, leg oedema (21 Apr 2022 13:02)    	        PAST MEDICAL & SURGICAL HISTORY:  HTN (hypertension), benign    HLD (hyperlipidemia)    DM type 2 (diabetes mellitus, type 2)    TIA (transient ischemic attack)    Atrial fibrillation    MI (myocardial infarction)  circa 2014 and 2022.    CAD (coronary artery disease)    Neuropathy    Stage 3 chronic kidney disease    2019 novel coronavirus disease (COVID-19)  12/2021.  Received the COVID-19 vaccine x 2 as of April 2022.    Status post angioplasty with stent  LULU x 3 2/7/2014    S/P carotid endarterectomy  left            REVIEW OF SYSTEMS:    NECK: No pain or stiffness  RESPIRATORY: No cough, wheezing, chills or hemoptysis; No Shortness of Breath  CARDIOVASCULAR: No chest pain, palpitations, passing out,  GASTROINTESTINAL: No abdominal or epigastric pain. No nausea, vomiting, or hematemesis;   GENITOURINARY: No dysuria, frequency, hematuria, or incontinence  NEUROLOGICAL: No headaches,   c/o pain but not specific  when you touch legs    Medications:  MEDICATIONS  (STANDING):  allopurinol 100 milliGRAM(s) Oral daily  aspirin enteric coated 81 milliGRAM(s) Oral daily  atorvastatin 40 milliGRAM(s) Oral at bedtime  chlorhexidine 2% Cloths 1 Application(s) Topical <User Schedule>  chlorhexidine 4% Liquid 1 Application(s) Topical <User Schedule>  dextrose 50% Injectable 25 Gram(s) IV Push once  dextrose 50% Injectable 25 Gram(s) IV Push once  dextrose 50% Injectable 12.5 Gram(s) IV Push once  diltiazem    milliGRAM(s) Oral daily  epoetin naz-epbx (RETACRIT) Injectable 20647 Unit(s) IV Push once  heparin  Infusion 4200 Unit(s)/Hr (42 mL/Hr) IV Continuous <Continuous>  hydrALAZINE 25 milliGRAM(s) Oral three times a day  insulin lispro (ADMELOG) corrective regimen sliding scale   SubCutaneous at bedtime  insulin lispro (ADMELOG) corrective regimen sliding scale   SubCutaneous three times a day before meals  lidocaine   4% Patch 1 Patch Transdermal daily  metoprolol succinate  milliGRAM(s) Oral daily  polyethylene glycol 3350 17 Gram(s) Oral two times a day  pregabalin 50 milliGRAM(s) Oral two times a day  senna 2 Tablet(s) Oral at bedtime  sevelamer carbonate 1600 milliGRAM(s) Oral three times a day with meals    MEDICATIONS  (PRN):  bisacodyl 5 milliGRAM(s) Oral every 12 hours PRN Constipation  dextrose Oral Gel 15 Gram(s) Oral once PRN Blood Glucose LESS THAN 70 milliGRAM(s)/deciliter  sodium chloride 0.9% lock flush 10 milliLiter(s) IV Push every 1 hour PRN Pre/post blood products, medications, blood draw, and to maintain line patency    	    PHYSICAL EXAM:  T(C): 36.6 (04-21-22 @ 04:20), Max: 36.8 (04-20-22 @ 14:07)  HR: 76 (04-21-22 @ 04:20) (76 - 100)  BP: 147/70 (04-21-22 @ 04:20) (119/56 - 153/78)  RR: 18 (04-21-22 @ 04:20) (16 - 20)  SpO2: 95% (04-21-22 @ 04:20) (92% - 97%)  Wt(kg): --  I&O's Summary    20 Apr 2022 07:01  -  21 Apr 2022 07:00  --------------------------------------------------------  IN: 240 mL / OUT: 0 mL / NET: 240 mL        Appearance: Normal	  HEENT:   Normal oral mucosa, PERRL, EOMI	  Lymphatic: No lymphadenopathy  Cardiovascular: Normal S1 S2, No JVD,  Respiratory: Lungs clear to auscultation	  Psychiatry: A & O  Gastrointestinal:  Soft, Non-tender, + BS	  	  Neurologic: Non-focal  Extremities: dec rom  pvd    TELEMETRY: 	    ECG:  	  RADIOLOGY:  OTHER: 	  	  LABS:	 	    CARDIAC MARKERS:                                8.5    14.72 )-----------( 278      ( 21 Apr 2022 06:51 )             28.2     04-21    132<L>  |  92<L>  |  69<H>  ----------------------------<  192<H>  4.8   |  20<L>  |  7.42<H>    Ca    8.6      21 Apr 2022 06:50      proBNP:   Lipid Profile:   HgA1c:   TSH:

## 2022-04-21 NOTE — PROGRESS NOTE ADULT - PROBLEM SELECTOR PLAN 5
-ABG with no CO2 retention, sedating medications held  -? if 2nd to uremia, HD per renal.  -Mental status improved

## 2022-04-21 NOTE — OCCUPATIONAL THERAPY INITIAL EVALUATION ADULT - LIVES WITH, PROFILE
Pt lives with spouse in home with 3 steps to enter. Prior to admission patient was independent in all functional mobility and ADL's, uses a cane, walker, or motorized wheelchair for mobility

## 2022-04-21 NOTE — OCCUPATIONAL THERAPY INITIAL EVALUATION ADULT - LEVEL OF INDEPENDENCE: SIT/STAND, REHAB EVAL
will assess when appropriate. Pt with poor command follow and poor sitting balance at EOB./unable to perform

## 2022-04-21 NOTE — PROGRESS NOTE ADULT - NS ATTEND AMEND GEN_ALL_CORE FT
as per daughter intermittent moments of confusion lasting a few min  lungs decrease bs   ext trace edema chronic stasis changes    ANT   HD as above  PT   plan as per primary team

## 2022-04-21 NOTE — PROVIDER CONTACT NOTE (CRITICAL VALUE NOTIFICATION) - BACKGROUND
Patient is admitted for ANT.
Pt admitted for ANT
Pt on heparin drip. Pt admitted for chronic kidney disease.

## 2022-04-21 NOTE — PROGRESS NOTE ADULT - SUBJECTIVE AND OBJECTIVE BOX
CARDIOLOGY FOLLOW UP - Dr. Mazariegos  Date of Service: 4/21/22  CC: no cp/sob  s/p permacath placement yesterday     Review of Systems:  Constitutional: No fever, weight loss, or fatigue  Respiratory: No cough, wheezing, or hemoptysis, no shortness of breath  Cardiovascular: No chest pain, palpitations, passing out, dizziness, or leg swelling  Gastrointestinal: No abd or epigastric pain.  No nausea, vomiting, or hematemesis; no diarrhea or constipation, no melena or hematochezia  Vascular: no edema       PHYSICAL EXAM:  T(C): 36.6 (04-21-22 @ 04:20), Max: 36.8 (04-20-22 @ 12:13)  HR: 76 (04-21-22 @ 04:20) (76 - 100)  BP: 147/70 (04-21-22 @ 04:20) (119/56 - 155/75)  RR: 18 (04-21-22 @ 04:20) (16 - 20)  SpO2: 95% (04-21-22 @ 04:20) (92% - 98%)  Wt(kg): --  I&O's Summary    20 Apr 2022 07:01  -  21 Apr 2022 07:00  --------------------------------------------------------  IN: 240 mL / OUT: 0 mL / NET: 240 mL        Appearance: Normal	  Cardiovascular: irreg , No JVD, No murmurs  Respiratory: Lungs clear to auscultation	  Gastrointestinal:  Soft, Non-tender, + BS	  Extremities: Normal range of motion, No clubbing, cyanosis or edema      Home Medications:  allopurinol 100 mg oral tablet: 1 tab(s) orally once a day (03 Apr 2022 06:34)  aspirin 81 mg oral delayed release tablet: 1 tab(s) orally once a day (03 Apr 2022 06:34)  bumetanide 2 mg oral tablet: 1 tab(s) orally once a day (03 Apr 2022 06:34)  insulin glargine 100 units/mL subcutaneous solution: 25 unit(s) subcutaneous once a day (at bedtime) (03 Apr 2022 06:34)  metOLazone 5 mg oral tablet: 1 tab(s) orally 3 times a week (03 Apr 2022 06:34)  metoprolol succinate 50 mg oral tablet, extended release: 2 tab(s) orally once a day (03 Apr 2022 06:34)  NovoLOG 100 units/mL subcutaneous solution: subcutaneous 4 times a day (before meals and at bedtime) (03 Apr 2022 06:34)  NovoLOG FlexPen 100 units/mL injectable solution: 10 unit(s) subcutaneous 3 times a day (03 Apr 2022 06:34)  oxycodone-acetaminophen 5 mg-325 mg oral tablet: 1 tab(s) orally 2 times a day (03 Apr 2022 06:34)  Pradaxa 150 mg oral capsule: 1 cap(s) orally 2 times a day (03 Apr 2022 06:34)  pregabalin 100 mg oral capsule: 1 cap(s) orally 3 times a day (03 Apr 2022 06:34)      MEDICATIONS  (STANDING):  allopurinol 100 milliGRAM(s) Oral daily  aspirin enteric coated 81 milliGRAM(s) Oral daily  atorvastatin 40 milliGRAM(s) Oral at bedtime  chlorhexidine 2% Cloths 1 Application(s) Topical <User Schedule>  chlorhexidine 4% Liquid 1 Application(s) Topical <User Schedule>  dextrose 50% Injectable 25 Gram(s) IV Push once  dextrose 50% Injectable 25 Gram(s) IV Push once  dextrose 50% Injectable 12.5 Gram(s) IV Push once  diltiazem    milliGRAM(s) Oral daily  epoetin naz-epbx (RETACRIT) Injectable 30904 Unit(s) IV Push once  heparin  Infusion 4200 Unit(s)/Hr (42 mL/Hr) IV Continuous <Continuous>  hydrALAZINE 25 milliGRAM(s) Oral three times a day  insulin lispro (ADMELOG) corrective regimen sliding scale   SubCutaneous three times a day before meals  insulin lispro (ADMELOG) corrective regimen sliding scale   SubCutaneous at bedtime  lidocaine   4% Patch 1 Patch Transdermal daily  metoprolol succinate  milliGRAM(s) Oral daily  polyethylene glycol 3350 17 Gram(s) Oral two times a day  pregabalin 25 milliGRAM(s) Oral two times a day  senna 2 Tablet(s) Oral at bedtime  sevelamer carbonate 1600 milliGRAM(s) Oral three times a day with meals      TELEMETRY: 	afib 80-100s     ECG:  	  RADIOLOGY:   DIAGNOSTIC TESTING:  [ ] Echocardiogram:  [ ]  Catheterization:  [ ] Stress Test:    OTHER: 	    LABS:	 	                            8.5    14.72 )-----------( 278      ( 21 Apr 2022 06:51 )             28.2     04-21    132<L>  |  92<L>  |  69<H>  ----------------------------<  192<H>  4.8   |  20<L>  |  7.42<H>    Ca    8.6      21 Apr 2022 06:50      PTT - ( 21 Apr 2022 06:51 )  PTT:63.2 sec

## 2022-04-21 NOTE — PROGRESS NOTE ADULT - ASSESSMENT
A/P    66 y/o M with history of CAD, s/p multiple PCI, with most recent 2/22 PCI of LAD in setting of NSTEMI, on ASA only (pradaxa), chronic atrial fibrillation maintained on Pradaxa, essential HTN, type 2 DM previously on oral medications but on insulin, diabetic neuropathy with chronic RIGHT LE venous stasis changes>left, LEFT foot ulcer seen by podiatry, past endarterectomy LEFT CEA in 2014 presenting with 1 week of decreased urine output, cystitis with hematuria     #ANT on CKD, new HD  -oliguric renal failure in setting of ? UTI/cystitis, r/o obstruction ? secondary to abx   -worsened with diuretic use but unlikely due to hypovolemia alone from diuretic use   -hyperkalemia improved   -New HD for uremia, renal failure  -renal f/u -sp rrt 4/12 for uncontrolled bleeding sp  right groin shiley removal  - monitor h/h - stable  -s/p L AVG 4/18 -- vasc f/u   -s/p permacath placement 4/20  -renal f/ u    #AMS/Lethargy  -sig improved post hd   -events noted last night - MS back to baseline   -likely 2/2 Uremic encephalopathy   -ABG noted  -med f/u   -MICU eval noted 4/5  -head ct ok    #Acute on chronic HFpEF  -remains overloaded but improved  -continue aggressive volume removal with HD  -now tolerating po - c/w  bb    #Chronic AF  -rates improved sp cardizem gtt    -c/w metoprolol succ 100 mg qd  -c/w dilt cd 120mg daily    -hep gtt- if no further interventions planned , recommend to transition to eliquis 5 mg BID     #HTN  -stable--- c/w meds   -increase hydral if bp remains elevated     #CAD, s/p PCI, most recent 2/2022  -stable, off ASA for ir , off plavix due to a/c indication  -statin     #R carotid stenosis  -cont med tx  -MRA noted with Greater than 75% stenosis of the right distal common carotid artery. Approximately 60% stenosis of the proximal left internal carotid artery.  -Continue ASA and Statin Therapy  -vasc outpt f/u Dr Pinto    ACP- Advanced Care Planning  -Advanced care planning discussed with patient. Advanced care planning forms discussed with patient and/or family.  Risks, benefits, and alternatives of medical/cardiac procedures were discussed in detail with all questions answered.  30 minutes were spent addressing advance care planning.        plan discussed with ACP and family at bedside      A/P    68 y/o M with history of CAD, s/p multiple PCI, with most recent 2/22 PCI of LAD in setting of NSTEMI, on ASA only (pradaxa), chronic atrial fibrillation maintained on Pradaxa, essential HTN, type 2 DM previously on oral medications but on insulin, diabetic neuropathy with chronic RIGHT LE venous stasis changes>left, LEFT foot ulcer seen by podiatry, past endarterectomy LEFT CEA in 2014 presenting with 1 week of decreased urine output, cystitis with hematuria     #ANT on CKD, new HD  -oliguric renal failure in setting of ? UTI/cystitis, r/o obstruction ? secondary to abx   -worsened with diuretic use but unlikely due to hypovolemia alone from diuretic use   -hyperkalemia improved   -New HD for uremia, renal failure  -renal f/u -sp rrt 4/12 for uncontrolled bleeding sp  right groin shiley removal  - monitor h/h - stable  -s/p L AVG 4/18 -- vasc f/u   -s/p permacath placement 4/20  -renal f/ u    #AMS/Lethargy  -sig improved post hd   -events noted last night - MS back to baseline   -likely 2/2 Uremic encephalopathy   -ABG noted  -med f/u   -MICU eval noted 4/5  -head ct ok    #Acute on chronic HFpEF  -remains overloaded but improved  -continue aggressive volume removal with HD  -now tolerating po - c/w  bb    #Chronic AF  -rates improved sp cardizem gtt    -c/w metoprolol succ 100 mg qd  -c/w dilt cd 120mg daily    -hep gtt- if no further interventions planned , recommend to transition to eliquis 5 mg BID     #HTN  -stable--- c/w meds   -increase hydral if bp remains elevated     #CAD, s/p PCI, most recent 2/2022  -stable, cont asa  -off plavix due to a/c indication  -statin     #R carotid stenosis  -cont med tx  -MRA noted with Greater than 75% stenosis of the right distal common carotid artery. Approximately 60% stenosis of the proximal left internal carotid artery.  -Continue ASA and Statin Therapy  -vasc outpt f/u Dr Pinto    ACP- Advanced Care Planning  -Advanced care planning discussed with patient. Advanced care planning forms discussed with patient and/or family.  Risks, benefits, and alternatives of medical/cardiac procedures were discussed in detail with all questions answered.  30 minutes were spent addressing advance care planning.        plan discussed with ACP and family at bedside

## 2022-04-21 NOTE — PROVIDER CONTACT NOTE (CRITICAL VALUE NOTIFICATION) - ASSESSMENT
no s/s of bleeding
Patient is A&Ox3. No s/s of bleeding. Currently on dialysis.
Pt is A&Ox0 responsive to painful stimuli. s/p HD today. on heparin drip no s/s of gross bleeding.
/67 . Pt denies cp and sob. No signs of bleeding.

## 2022-04-21 NOTE — OCCUPATIONAL THERAPY INITIAL EVALUATION ADULT - PERTINENT HX OF CURRENT PROBLEM, REHAB EVAL
68 y/o M with history of CAD, s/p multiple PCI, with most recent 2/22 PCI of LAD in setting of NSTEMI, on ASA only (pradaxa), chronic atrial fibrillation maintained on Pradaxa, essential HTN, type 2 DM previously on oral medications but on insulin, diabetic neuropathy with chronic RIGHT LE venous stasis changes>left, LEFT foot ulcer seen by podiatry, past endarterectomy LEFT CEA in 2014 presenting with 1 week of decreased urine output, cystitis with hematuria.

## 2022-04-21 NOTE — OCCUPATIONAL THERAPY INITIAL EVALUATION ADULT - FINE MOTOR COORDINATION, RIGHT HAND, MANIPULATE OBJECTS, OT EVAL
Date of Service: 12/04/2018    SUBJECTIVE:  The patient is here today for an infected finger.  Two days ago, he was working with some metal piping that was clean.  He inadvertently got a metal sliver in the distal aspect of his left fifth finger.  He cleaned it out, but unfortunately it has become more red, more swollen, more tender.  Also, he got bumped in the lip and now his lip is also swollen, so is here today for further evaluation of that.  He is on Accutane.  His acne is getting a lot better.  He is on his last month of that and is tolerating it fine and is here today for further evaluation.    PHYSICAL EXAMINATION:  GENERAL:  He is actually alert, well appearing, in no acute distress.   HEENT:   His upper lip is swollen, erythematous, and almost looks like he had a little mucosal abrasion that is healing.  It is almost like a little eschar formation but had normal dentition, no oral lesions, tonsillar injection or exudate.  Mucous membranes are moist.  NECK:  Supple without any lymphadenopathy or thyromegaly.    EXTREMITIES:  On the distal aspect of his left finger, the distal phalanx is erythematous, swollen.  It does not extend beyond that DIP joint.  He has normal perfusion, sensation otherwise to that finger.    ASSESSMENT:  Cellulitis of the left upper lip and also cellulitis of the distal aspect of the left fifth digit.    PLAN:  For now, I recommended treating with Cephalexin 500 t.i.d. for 10 days.  He is up-to-date on his tetanus vaccine.  If it becomes more red, more swollen, more tender, obviously will let me know.  I will be happy to follow up as needed.      Dictated By: Michelle Valdez MD  Signing Provider: MD JOSE Umaña/sindy ([id number])  DD: 12/04/2018 14:17:40 TD: 12/05/2018 10:45:23    Copy Sent To:    moderate impairment

## 2022-04-21 NOTE — PROGRESS NOTE ADULT - ASSESSMENT
67 year old gentleman with PMH of CAD, NSTEMI s/p 3 stents in 2014 (stents to LAD, proximal and distal LCx), ischemic cardiomyopathy, HTN for 10 years, T2DM for 26 years c/b peripheral neuropathy, CVA, TIA, Afib on Pradaxa, chronic RLE wound, L carotid stenosis s/p endarterectomy (2014) just recently admitted  to the [facility] on 2/19/2022 with acute onset chest pain for one day found to have an NSTEMI, CAD, s/p PCI in setting of increased AF rates off bb for 3 days and resolved chest pain, his CKMB peaked and Echo noted with EF 60%,normal LV function, mild TR, mild OK. He was s/p [facility] with 85% proximal LAD s/p balloon angioplasty and LULU. He presented to [facility] ED with decreased urine output over the week. [facility] and was started  on Nitrofurantoin. Pt is still taking Nitrofurantoin w/ 5 days left. He came to the ED due to worsening symptoms. Pt reports having a similar incident 4-5 years ago that resolved spontaneously. He reports increased leg edema Dyspnea worse on exertion. his baseline Serum creatinine is in the 2.0 to 2.3 mg/dl range. ANT with serum creatinine of 8.29 with bun 144 and k 5.5        1- ANT on ckd III   2- chf chronic   3- hyperkalemia  on hd   4- uremia improving   5- hyperphosphatemia       HD today  F180, 240 min, , , 3L fluid removal  see HD flowsheet   renvela 2 tab with meals   anemia- epogen 81464 Units TIW with HD.  neuropathy continue lyrica at 50 mg BID  PT  outpt hd arrangements vs rehab

## 2022-04-22 NOTE — PROGRESS NOTE ADULT - SUBJECTIVE AND OBJECTIVE BOX
CARDIOLOGY FOLLOW UP - Dr. Mazariegos  Date of Service: 4/22/22  CC: no cp/sob  bleeding from permacath dressing noted   c/o fatigue     Review of Systems:  Constitutional: No fever, weight loss, or fatigue  Respiratory: No cough, wheezing, or hemoptysis, no shortness of breath  Cardiovascular: No chest pain, palpitations, passing out, dizziness, or leg swelling  Gastrointestinal: No abd or epigastric pain.  No nausea, vomiting, or hematemesis; no diarrhea or constipation, no melena or hematochezia  Vascular: no edema       PHYSICAL EXAM:  T(C): 36.8 (04-22-22 @ 05:55), Max: 36.8 (04-22-22 @ 05:55)  HR: 119 (04-22-22 @ 05:55) (82 - 119)  BP: 141/69 (04-22-22 @ 05:55) (115/69 - 147/68)  RR: 18 (04-22-22 @ 05:55) (18 - 18)  SpO2: 94% (04-22-22 @ 05:55) (92% - 94%)  Wt(kg): --  I&O's Summary    21 Apr 2022 07:01  -  22 Apr 2022 07:00  --------------------------------------------------------  IN: 598 mL / OUT: 2500 mL / NET: -1902 mL        Appearance: Normal	  Cardiovascular: irreg  No JVD, No murmurs  Respiratory: Lungs clear to auscultation	  Gastrointestinal:  Soft, Non-tender, + BS	  Extremities: Normal range of motion, No clubbing, cyanosis or edema      Home Medications:  allopurinol 100 mg oral tablet: 1 tab(s) orally once a day (03 Apr 2022 06:34)  aspirin 81 mg oral delayed release tablet: 1 tab(s) orally once a day (03 Apr 2022 06:34)  bumetanide 2 mg oral tablet: 1 tab(s) orally once a day (03 Apr 2022 06:34)  insulin glargine 100 units/mL subcutaneous solution: 25 unit(s) subcutaneous once a day (at bedtime) (03 Apr 2022 06:34)  metOLazone 5 mg oral tablet: 1 tab(s) orally 3 times a week (03 Apr 2022 06:34)  metoprolol succinate 50 mg oral tablet, extended release: 2 tab(s) orally once a day (03 Apr 2022 06:34)  NovoLOG 100 units/mL subcutaneous solution: subcutaneous 4 times a day (before meals and at bedtime) (03 Apr 2022 06:34)  NovoLOG FlexPen 100 units/mL injectable solution: 10 unit(s) subcutaneous 3 times a day (03 Apr 2022 06:34)  oxycodone-acetaminophen 5 mg-325 mg oral tablet: 1 tab(s) orally 2 times a day (03 Apr 2022 06:34)  Pradaxa 150 mg oral capsule: 1 cap(s) orally 2 times a day (03 Apr 2022 06:34)  pregabalin 100 mg oral capsule: 1 cap(s) orally 3 times a day (03 Apr 2022 06:34)      MEDICATIONS  (STANDING):  allopurinol 100 milliGRAM(s) Oral daily  apixaban 5 milliGRAM(s) Oral two times a day  aspirin enteric coated 81 milliGRAM(s) Oral daily  atorvastatin 40 milliGRAM(s) Oral at bedtime  chlorhexidine 2% Cloths 1 Application(s) Topical <User Schedule>  chlorhexidine 4% Liquid 1 Application(s) Topical <User Schedule>  dextrose 50% Injectable 25 Gram(s) IV Push once  dextrose 50% Injectable 12.5 Gram(s) IV Push once  dextrose 50% Injectable 25 Gram(s) IV Push once  diltiazem    milliGRAM(s) Oral daily  hydrALAZINE 25 milliGRAM(s) Oral three times a day  insulin lispro (ADMELOG) corrective regimen sliding scale   SubCutaneous at bedtime  insulin lispro (ADMELOG) corrective regimen sliding scale   SubCutaneous three times a day before meals  lidocaine   4% Patch 1 Patch Transdermal daily  metoprolol succinate  milliGRAM(s) Oral daily  polyethylene glycol 3350 17 Gram(s) Oral two times a day  pregabalin 50 milliGRAM(s) Oral two times a day  senna 2 Tablet(s) Oral at bedtime  sevelamer carbonate 1600 milliGRAM(s) Oral three times a day with meals      TELEMETRY: 	afib 80-110s     ECG:  	  RADIOLOGY:   DIAGNOSTIC TESTING:  [ ] Echocardiogram:  [ ]  Catheterization:  [ ] Stress Test:    OTHER: 	    LABS:	 	                            9.0    14.48 )-----------( 187      ( 22 Apr 2022 07:23 )             29.5     04-22    131<L>  |  92<L>  |  48<H>  ----------------------------<  146<H>  4.4   |  22  |  5.93<H>    Ca    8.7      22 Apr 2022 07:23      PT/INR - ( 21 Apr 2022 16:20 )   PT: 13.8 sec;   INR: 1.20 ratio         PTT - ( 21 Apr 2022 16:20 )  PTT:152.6 sec

## 2022-04-22 NOTE — CHART NOTE - NSCHARTNOTEFT_GEN_A_CORE
Called IR this Am regarding blood oozing from permcath site. Has been occurring slowly overnight. Hemodynamics stable. No complaints of pain.    IR came and eval. Held pressure and changed dressing

## 2022-04-22 NOTE — PROGRESS NOTE ADULT - ASSESSMENT
67 year old gentleman with PMH of CAD, NSTEMI s/p 3 stents in 2014 (stents to LAD, proximal and distal LCx), ischemic cardiomyopathy, HTN for 10 years, T2DM for 26 years c/b peripheral neuropathy, CVA, TIA, Afib on Pradaxa, chronic RLE wound, L carotid stenosis s/p endarterectomy (2014) just recently admitted  to the [facility] on 2/19/2022 with acute onset chest pain for one day found to have an NSTEMI, CAD, s/p PCI in setting of increased AF rates off bb for 3 days and resolved chest pain, his CKMB peaked and Echo noted with EF 60%,normal LV function, mild TR, mild IL. He was s/p [facility] with 85% proximal LAD s/p balloon angioplasty and LULU. He presented to [facility] ED with decreased urine output over the week. [facility]uria and was started  on Nitrofurantoin. Pt is still taking Nitrofurantoin w/ 5 days left. He came to the ED due to worsening symptoms. Pt reports having a similar incident 4-5 years ago that resolved spontaneously. He reports increased leg edema Dyspnea worse on exertion. his baseline Serum creatinine is in the 2.0 to 2.3 mg/dl range. ANT with serum creatinine of 8.29 with bun 144 and k 5.5        1- ANT on ckd III   2- chf chronic   3- hyperkalemia  on hd   4- uremia improving   5- hyperphosphatemia       No HD today  renvela 2 tab with meals   anemia - epogen 17887 Units TIW with HD.  d/c lyrica  PT  IR follow up for bleeding at [facility]  d/w social work regarding rehab options

## 2022-04-22 NOTE — PROGRESS NOTE ADULT - ASSESSMENT
Patient is a 67 year old male with PMH of CAD with past multiple PCI, with most recent discharge from [place] in Feb 2022 for NSTEMI with LULU of an 85% prox LAD lesion with patient on ASA and now off Plavix per cardiology (only for one month), chronic afib on Pradaxa, HTN, type 2 DM on insulin, diabetic neuropathy with chronic RIGHT LE venous stasis changes>left, LEFT foot ulcer seen by podiatry, past endarterectomy LEFT CEA in 2014 who presented with UTI with hematuria diagnosed at urgent care and now with decreased urine output.   Plan to take patient to OR Monday 4/18 for LUE AVF creation, possible graft placement.    Acute cystitis  Leukocytosis  Noted dark urine with hematuria, went to UC and was prescribed macrobid, had some improvement in urine color but then noticed decreased urine output with no voiding reported in last 2 days  UA here with pyuria -  with large LE, negative nitrites and no bacteria. UCx/BCx NGTD  Imaging: CTAP reviewed - no hydronephrosis, nephrolithiasis, or evidence of obstructive uropathy  S/p ceftriaxone x7 day course completed 4/9  C/w monitoring off Abx.   Trend temps/WBC--Elevated WBC noted. No obv localizing concerns for new or recurrent infection.    ANT on CKD3   - Nephrology following, appreciate recs   - s/p placement of shiley and initiation of HD   - pending permcath placement   - monitor Cr    - renally dose meds    - avoid nephrotoxic agents    B/l LE edema with chronic venous stasis  Acute on chronic HFpEF   - legs cool to touch, nontender, chronic changes with no sign of infection/cellulitis   - has wound on bottom of L foot - dry and does not appear infected either   - clinically overloaded, Cardiology and Nephrology following   - elevate lower extremities      DM2 with neuropathy   - Blood glucose management per primary team.    Infectious Diseases will continue to follow. Please call with any questions.   Annie King M.D.  [facility], Division of Infectious Diseases [phone]     Patient is a 67 year old male with PMH of CAD with past multiple PCI, with most recent discharge from [place] in Feb 2022 for NSTEMI with LULU of an 85% prox LAD lesion with patient on ASA and now off Plavix per cardiology (only for one month), chronic afib on Pradaxa, HTN, type 2 DM on insulin, diabetic neuropathy with chronic RIGHT LE venous stasis changes>left, LEFT foot ulcer seen by podiatry, past endarterectomy LEFT CEA in 2014 who presented with UTI with hematuria diagnosed at urgent care and now with decreased urine output.   Plan to take patient to OR Monday 4/18 for LUE AVF creation, possible graft placement.    Acute cystitis  Leukocytosis  Noted dark urine with hematuria, went to UC and was prescribed macrobid, had some improvement in urine color but then noticed decreased urine output with no voiding reported in last 2 days  UA here with pyuria -  with large LE, negative nitrites and no bacteria. UCx/BCx NGTD  Imaging: CTAP reviewed - no hydronephrosis, nephrolithiasis, or evidence of obstructive uropathy  S/p ceftriaxone x7 day course completed 4/9  C/w monitoring off Abx.   Trend temps/WBC--Elevated WBC noted. No obv localizing concerns for new or recurrent infection.    ANT on CKD3   - Nephrology following, appreciate recs   - s/p placement of shiley and initiation of HD   - permcath placement 4/20   - monitor Cr    - renally dose meds    - avoid nephrotoxic agents    B/l LE edema with chronic venous stasis  Acute on chronic HFpEF   - legs cool to touch, nontender, chronic changes with no sign of infection/cellulitis   - has wound on bottom of L foot - dry and does not appear infected either   - clinically overloaded, Cardiology and Nephrology following   - elevate lower extremities      DM2 with neuropathy   - Blood glucose management per primary team.    Infectious Diseases will continue to follow. Please call with any questions.   Annie King M.D.  [facility], Division of Infectious Diseases [phone]

## 2022-04-22 NOTE — PROGRESS NOTE ADULT - ASSESSMENT
·  Problem: Acute kidney injury superimposed on CKD. pt currently on hd  to cont as per renal  avf done luext   perm cath done  overnight bleeding now resolved   medically stable       Lethargy resolving  hold Lyrica x 1 - 2 days  - CT head no acute event       Problem/Plan - 2:  ·  Problem: Chronic atrial fibrillation. c/w a/c  cards f/u     Problem/Plan - 3:  ·  Problem: Cystitis.   treatment as per id  s/p abs     Problem/Plan - 4:  ·  Problem: Type 2 diabetes mellitus. c/w insulin   endo f/u     Problem/Plan - 5:  ·  Problem: CAD (coronary artery disease).   stable  cards f/u       s/p shiley removal / groin w/ bleeding resolved      constipation better  bowel regimen  enema  lactulose    gouty pain  better    discussed with patient's son at the bedside in detail

## 2022-04-22 NOTE — PROGRESS NOTE ADULT - SUBJECTIVE AND OBJECTIVE BOX
[facility]   [phone]  RENAL FOLLOW UP NOTE  --------------------------------------------------------------------------------  Chief Complaint:    24 hour events/subjective:    Patient seen and examined with Dr. Art coyne lethargic today  bleeding from permcath    PAST HISTORY  --------------------------------------------------------------------------------  No significant changes to PMH, PSH, FHx, SHx, unless otherwise noted    ALLERGIES & MEDICATIONS  --------------------------------------------------------------------------------  Allergies    nitrofurantoin (Nephrotoxicity)    Intolerances      Standing Inpatient Medications  allopurinol 100 milliGRAM(s) Oral daily  apixaban 5 milliGRAM(s) Oral two times a day  aspirin enteric coated 81 milliGRAM(s) Oral daily  atorvastatin 40 milliGRAM(s) Oral at bedtime  chlorhexidine 2% Cloths 1 Application(s) Topical <User Schedule>  chlorhexidine 4% Liquid 1 Application(s) Topical <User Schedule>  dextrose 50% Injectable 25 Gram(s) IV Push once  dextrose 50% Injectable 25 Gram(s) IV Push once  dextrose 50% Injectable 12.5 Gram(s) IV Push once  diltiazem    milliGRAM(s) Oral daily  hydrALAZINE 25 milliGRAM(s) Oral three times a day  insulin lispro (ADMELOG) corrective regimen sliding scale   SubCutaneous three times a day before meals  insulin lispro (ADMELOG) corrective regimen sliding scale   SubCutaneous at bedtime  lidocaine   4% Patch 1 Patch Transdermal daily  metoprolol succinate  milliGRAM(s) Oral daily  polyethylene glycol 3350 17 Gram(s) Oral two times a day  pregabalin 25 milliGRAM(s) Oral two times a day  senna 2 Tablet(s) Oral at bedtime  sevelamer carbonate 1600 milliGRAM(s) Oral three times a day with meals    PRN Inpatient Medications  bisacodyl 5 milliGRAM(s) Oral every 12 hours PRN  dextrose Oral Gel 15 Gram(s) Oral once PRN  sodium chloride 0.9% lock flush 10 milliLiter(s) IV Push every 1 hour PRN      REVIEW OF SYSTEMS  --------------------------------------------------------------------------------    patient is unable to give ROS    VITALS/PHYSICAL EXAM  --------------------------------------------------------------------------------  T(C): 36.8 (04-22-22 @ 05:55), Max: 36.8 (04-22-22 @ 05:55)  HR: 119 (04-22-22 @ 05:55) (82 - 119)  BP: 141/69 (04-22-22 @ 05:55) (115/69 - 147/68)  RR: 18 (04-22-22 @ 05:55) (18 - 18)  SpO2: 94% (04-22-22 @ 05:55) (92% - 94%)  Wt(kg): --  Height (cm): 180.3 (04-20-22 @ 14:08)  Weight (kg): 120.9 (04-20-22 @ 14:08)  BMI (kg/m2): 37.2 (04-20-22 @ 14:08)  BSA (m2): 2.38 (04-20-22 @ 14:08)      04-21-22 @ 07:01  -  04-22-22 @ 07:00  --------------------------------------------------------  IN: 598 mL / OUT: 2500 mL / NET: -1902 mL      Physical Exam:  	              Gen: Appears comfortable, lethargic  	Pulm: Decreased breath sounds b/l bases.  	CV: No JVD. IRR, tachy  	Abd: +BS, soft, nontender, softly distended, obese               Extremity UE: increased warmth, R hand, increased warmth, swollen              Extremity LE: + hyperpigmented bilateral LE with B/L non-pitting edema- improved, B/L LE tender on palpation              Vascular: R IJ HD catheter, L arm AVF     LABS/STUDIES  --------------------------------------------------------------------------------              9.0    14.48 >-----------<  187      [04-22-22 @ 07:23]              29.5     131  |  92  |  48  ----------------------------<  146      [04-22-22 @ 07:23]  4.4   |  22  |  5.93        Ca     8.7     [04-22-22 @ 07:23]      PT/INR: PT 13.8 , INR 1.20       [04-21-22 @ 16:20]  PTT: 152.6      [04-21-22 @ 16:20]      Creatinine Trend:  SCr 5.93 [04-22 @ 07:23]  SCr 7.42 [04-21 @ 06:50]  SCr 5.83 [04-20 @ 07:13]  SCr 5.36 [04-19 @ 23:51]  SCr 8.47 [04-19 @ 07:34]              Urinalysis - [04-03-22 @ 00:48]      Color Light Orange / Appearance Turbid / SG 1.021 / pH 5.5      Gluc Negative / Ketone Negative  / Bili Negative / Urobili Negative       Blood Large / Protein 300 mg/dL / Leuk Est Large / Nitrite Negative      RBC 20 /  / Hyaline 10 / Gran  / Sq Epi  / Non Sq Epi 3 / Bacteria Negative      Iron 16, TIBC 239, %sat 7      [04-15-22 @ 10:16]  Ferritin 172      [04-15-22 @ 09:05]  PTH -- (Ca 8.3)      [04-15-22 @ 12:18]   150  PTH -- (Ca --)      [04-03-22 @ 23:06]   97  HbA1c 11.7      [11-07-19 @ 09:14]  TSH 1.98      [04-05-22 @ 05:19]    DEREJE: titer Negative, pattern Negative      [04-11-22 @ 16:24]  C3 Complement 61      [04-11-22 @ 16:56]  C4 Complement 28      [04-11-22 @ 16:56]  ANCA: cANCA Negative, pANCA Negative, atypical ANCA Indeterminate Method interference due to DEREJE fluorescence      [04-11-22 @ 16:24]  anti-GBM 3      [04-11-22 @ 19:27]  Free Light Chains: kappa 16.92, lambda 12.63, ratio = 1.34      [04-11 @ 16:56]  Immunofixation Serum: No Monoclonal Band Identified    Reference Range: None Detected      [04-11-22 @ 16:56]  SPEP Interpretation: Normal Electrophoresis Pattern      [04-11-22 @ 16:56]

## 2022-04-22 NOTE — PROGRESS NOTE ADULT - SUBJECTIVE AND OBJECTIVE BOX
[facility] [address], Division of Infectious Diseases  ADRIANA Seals Y. Patel, S. Shah, G. [facility]  [phone]    Name: DYLAN DEL CASTILLO  Age: 67y  Gender: Male  MRN: [id number]    Interval History:  Patient seen this afternoon.   Afebrile  Son at bedside  Reporting feeling very lethargic  Notes reviewed    Antibiotics:      Medications:  allopurinol 100 milliGRAM(s) Oral daily  apixaban 5 milliGRAM(s) Oral two times a day  aspirin enteric coated 81 milliGRAM(s) Oral daily  atorvastatin 40 milliGRAM(s) Oral at bedtime  bisacodyl 5 milliGRAM(s) Oral every 12 hours PRN  chlorhexidine 2% Cloths 1 Application(s) Topical <User Schedule>  chlorhexidine 4% Liquid 1 Application(s) Topical <User Schedule>  dextrose 50% Injectable 25 Gram(s) IV Push once  dextrose 50% Injectable 25 Gram(s) IV Push once  dextrose 50% Injectable 12.5 Gram(s) IV Push once  dextrose Oral Gel 15 Gram(s) Oral once PRN  diltiazem    milliGRAM(s) Oral daily  hydrALAZINE 25 milliGRAM(s) Oral three times a day  insulin lispro (ADMELOG) corrective regimen sliding scale   SubCutaneous three times a day before meals  insulin lispro (ADMELOG) corrective regimen sliding scale   SubCutaneous at bedtime  lidocaine   4% Patch 1 Patch Transdermal daily  metoprolol succinate  milliGRAM(s) Oral daily  polyethylene glycol 3350 17 Gram(s) Oral two times a day  senna 2 Tablet(s) Oral at bedtime  sevelamer carbonate 1600 milliGRAM(s) Oral three times a day with meals  sodium chloride 0.9% lock flush 10 milliLiter(s) IV Push every 1 hour PRN      Review of Systems:  unable to obtain  Allergies: nitrofurantoin (Nephrotoxicity)    For details regarding the patient's past medical history, social history, family history, and other miscellaneous elements, please refer the initial infectious diseases consultation and/or the admitting history and physical examination for this admission.    Objective:  Vitals:   T(C): 36.8 (04-22-22 @ 05:55), Max: 36.8 (04-22-22 @ 05:55)  HR: 119 (04-22-22 @ 05:55) (82 - 119)  BP: 141/69 (04-22-22 @ 05:55) (115/69 - 141/69)  RR: 18 (04-22-22 @ 05:55) (18 - 18)  SpO2: 94% (04-22-22 @ 05:55) (92% - 94%)    Physical Examination:  deferred    Laboratory Studies:  CBC:                       9.0    14.48 )-----------( 187      ( 22 Apr 2022 07:23 )             29.5     CMP: 04-22    131<L>  |  92<L>  |  48<H>  ----------------------------<  146<H>  4.4   |  22  |  5.93<H>    Ca    8.7      22 Apr 2022 07:23            Microbiology: reviewed      Radiology: reviewed

## 2022-04-22 NOTE — PROGRESS NOTE ADULT - SUBJECTIVE AND OBJECTIVE BOX
Follow-up Pulm Progress Note    Lethargic/confused today - Lyrica dose had been adjusted yesterday   Sats 94% RA    Medications:  MEDICATIONS  (STANDING):  allopurinol 100 milliGRAM(s) Oral daily  apixaban 5 milliGRAM(s) Oral two times a day  aspirin enteric coated 81 milliGRAM(s) Oral daily  atorvastatin 40 milliGRAM(s) Oral at bedtime  chlorhexidine 2% Cloths 1 Application(s) Topical <User Schedule>  chlorhexidine 4% Liquid 1 Application(s) Topical <User Schedule>  dextrose 50% Injectable 25 Gram(s) IV Push once  dextrose 50% Injectable 25 Gram(s) IV Push once  dextrose 50% Injectable 12.5 Gram(s) IV Push once  diltiazem    milliGRAM(s) Oral daily  hydrALAZINE 25 milliGRAM(s) Oral three times a day  insulin lispro (ADMELOG) corrective regimen sliding scale   SubCutaneous three times a day before meals  insulin lispro (ADMELOG) corrective regimen sliding scale   SubCutaneous at bedtime  lidocaine   4% Patch 1 Patch Transdermal daily  metoprolol succinate  milliGRAM(s) Oral daily  polyethylene glycol 3350 17 Gram(s) Oral two times a day  senna 2 Tablet(s) Oral at bedtime  sevelamer carbonate 1600 milliGRAM(s) Oral three times a day with meals    MEDICATIONS  (PRN):  bisacodyl 5 milliGRAM(s) Oral every 12 hours PRN Constipation  dextrose Oral Gel 15 Gram(s) Oral once PRN Blood Glucose LESS THAN 70 milliGRAM(s)/deciliter  sodium chloride 0.9% lock flush 10 milliLiter(s) IV Push every 1 hour PRN Pre/post blood products, medications, blood draw, and to maintain line patency          Vital Signs Last 24 Hrs  T(C): 36.8 (22 Apr 2022 05:55), Max: 36.8 (22 Apr 2022 05:55)  T(F): 98.3 (22 Apr 2022 05:55), Max: 98.3 (22 Apr 2022 05:55)  HR: 119 (22 Apr 2022 05:55) (82 - 119)  BP: 141/69 (22 Apr 2022 05:55) (115/69 - 147/68)  BP(mean): --  RR: 18 (22 Apr 2022 05:55) (18 - 18)  SpO2: 94% (22 Apr 2022 05:55) (92% - 94%)          04-21 @ 07:01  -  04-22 @ 07:00  --------------------------------------------------------  IN: 598 mL / OUT: 2500 mL / NET: -1902 mL          LABS:                        9.0    14.48 )-----------( 187      ( 22 Apr 2022 07:23 )             29.5     04-22    131<L>  |  92<L>  |  48<H>  ----------------------------<  146<H>  4.4   |  22  |  5.93<H>    Ca    8.7      22 Apr 2022 07:23            CAPILLARY BLOOD GLUCOSE      POCT Blood Glucose.: 160 mg/dL (22 Apr 2022 08:07)    PT/INR - ( 21 Apr 2022 16:20 )   PT: 13.8 sec;   INR: 1.20 ratio         PTT - ( 21 Apr 2022 16:20 )  PTT:152.6 sec        DEREJE Negative 04-11 @ 16:24  Anti SS-1 --  Anti SS-2 --  Anti RNP --  RF -- 04-11 @ 16:24    Atypical ANCA Indeterminate Method interference due to DEREJE fluorescence 04-11 @ 16:24  c-ANCA titer -- 04-11 @ 16:24  c-ANCA Negative 04-11 @ 16:24  p-ANCA Negative 04-11 @ 16:24            Physical Examination:  PULM: Clear to auscultation bilaterally, no significant sputum production  CVS: S1, S2 heard      RADIOLOGY REVIEWED  CXR 4/11: unchanged b/l effusions, atelectasis    CT chest: < from: CT Chest No Cont (04.04.22 @ 16:52) >  FINDINGS:    AIRWAYS, LUNGS, PLEURA: Tracheal secretions. Small left greater than   right pleural effusions increased compared to 2/19/2022. Right-sided   pleural thickening. Lingular and left greater than right basilar   opacification, likely atelectasis.    MEDIASTINUM: Cardiomegaly. No pericardial effusion. Thoracic aorta normal   caliber.  No large mediastinal lymph nodes.    IMAGED ABDOMEN: Nonspecific perinephric stranding.    SOFT TISSUES: Unremarkable.    BONES: Unremarkable.      IMPRESSION:.    Small left greater than right bilateral pleural effusions increased in   size compared to 2/19/2022.    Lingular and left greater than right basilar opacification, likely   atelectasis.    --- End of Report ---    < end of copied text >

## 2022-04-22 NOTE — PROGRESS NOTE ADULT - ASSESSMENT
A/P    66 y/o M with history of CAD, s/p multiple PCI, with most recent 2/22 PCI of LAD in setting of NSTEMI, on ASA only (pradaxa), chronic atrial fibrillation maintained on Pradaxa, essential HTN, type 2 DM previously on oral medications but on insulin, diabetic neuropathy with chronic RIGHT LE venous stasis changes>left, LEFT foot ulcer seen by podiatry, past endarterectomy LEFT CEA in 2014 presenting with 1 week of decreased urine output, cystitis with hematuria     #ANT on CKD, new HD  -oliguric renal failure in setting of ? UTI/cystitis, r/o obstruction ? secondary to abx   -worsened with diuretic use but unlikely due to hypovolemia alone from diuretic use   -hyperkalemia improved   -New HD for uremia, renal failure  -renal f/u -sp rrt 4/12 for uncontrolled bleeding sp  right groin shiley removal  - monitor h/h - stable  -s/p L AVG 4/18 -- vasc f/u   -s/p permacath placement 4/20 -bleeding noted from site - IR f/u   -renal f/ u    #AMS/Lethargy  -sig improved post hd   -events noted last night - MS back to baseline   -likely 2/2 Uremic encephalopathy   -ABG noted  -med f/u   -MICU eval noted 4/5  -head ct ok    #Acute on chronic HFpEF  -remains overloaded but improved  -continue aggressive volume removal with HD  -now tolerating po - c/w  bb    #Chronic AF  -rates overall stable   -c/w metoprolol succ 100 mg qd  -c/w dilt cd 120mg daily    -c/w eliquis 5 mg BID for now - heme stable     #HTN  -stable--- c/w meds   -increase hydral if bp remains elevated     #CAD, s/p PCI, most recent 2/2022  -stable, cont asa  -off plavix due to a/c indication  -statin     #R carotid stenosis  -cont med tx  -MRA noted with Greater than 75% stenosis of the right distal common carotid artery. Approximately 60% stenosis of the proximal left internal carotid artery.  -Continue ASA and Statin Therapy  -vasc outpt f/u Dr Pinto    ACP- Advanced Care Planning  -Advanced care planning discussed with patient. Advanced care planning forms discussed with patient and/or family.  Risks, benefits, and alternatives of medical/cardiac procedures were discussed in detail with all questions answered.  30 minutes were spent addressing advance care planning.        plan discussed with ACP and family at bedside

## 2022-04-23 NOTE — PROGRESS NOTE ADULT - ASSESSMENT
67 year old gentleman with PMH of CAD, NSTEMI s/p 3 stents in 2014 (stents to LAD, proximal and distal LCx), ischemic cardiomyopathy, HTN for 10 years, T2DM for 26 years c/b peripheral neuropathy, CVA, TIA, Afib on Pradaxa, chronic RLE wound, L carotid stenosis s/p endarterectomy (2014) just recently admitted  to the [facility] on 2/19/2022 with acute onset chest pain for one day found to have an NSTEMI, CAD, s/p PCI in setting of increased AF rates off bb for 3 days and resolved chest pain, his CKMB peaked and Echo noted with EF 60%,normal LV function, mild TR, mild NV. He was s/p [facility] with 85% proximal LAD s/p balloon angioplasty and LULU. He presented to [facility] ED with decreased urine output over the week. [facility]. Stevens went to [facility] for hematuria and was started  on Nitrofurantoin. Pt is still taking Nitrofurantoin w/ 5 days left. He came to the ED due to worsening symptoms. Pt reports having a similar incident 4-5 years ago that resolved spontaneously. He reports increased leg edema Dyspnea worse on exertion. his baseline Serum creatinine is in the 2.0 to 2.3 mg/dl range. ANT with serum creatinine of 8.29 with bun 144 and k 5.5        1- ANT on ckd III   2- chf chronic   3- hyperkalemia  on hd   4- uremia improving   5- hyperphosphatemia       HD today  F210, 180  min, , , 1.5 L fluid removal [[due to resource strain time decreased today ]    renvela 2 tab with meals   anemia- epogen [id number] Units TIW with HD.

## 2022-04-23 NOTE — PROGRESS NOTE ADULT - ASSESSMENT
·  Problem: Acute kidney injury superimposed on CKD. pt currently on hd  to cont as per renal  avf done luext   perm cath done        Lethargy resolving  hold Lyrica x 1 - 2 days  - CT head no acute event       Problem/Plan - 2:  ·  Problem: Chronic atrial fibrillation. c/w a/c  cards f/u     Problem/Plan - 3:  ·  Problem: Cystitis.   treatment as per id  s/p abs     Problem/Plan - 4:  ·  Problem: Type 2 diabetes mellitus. c/w insulin   endo f/u     Problem/Plan - 5:  ·  Problem: CAD (coronary artery disease).   stable  cards f/u       s/p shiley removal / groin w/ bleeding resolved      constipation better  bowel regimen  enema  lactulose    gouty pain  better    discussed with patient's wife at bedside

## 2022-04-23 NOTE — PROGRESS NOTE ADULT - SUBJECTIVE AND OBJECTIVE BOX
DATE OF SERVICE: 04-23-22 @ 12:23  CHIEF COMPLAINT:Patient is a 67y old  Male who presents with a chief complaint of Decreased urine output for the past week, leg oedema (23 Apr 2022 11:56)    	        PAST MEDICAL & SURGICAL HISTORY:  HTN (hypertension), benign    HLD (hyperlipidemia)    DM type 2 (diabetes mellitus, type 2)    TIA (transient ischemic attack)    Atrial fibrillation    MI (myocardial infarction)  circa 2014 and 2022.    CAD (coronary artery disease)    Neuropathy    Stage 3 chronic kidney disease    2019 novel coronavirus disease (COVID-19)  12/2021.  Received the COVID-19 vaccine x 2 as of April 2022.    Status post angioplasty with stent  LULU x 3 2/7/2014    S/P carotid endarterectomy  left          events noted  more alert    RESPIRATORY: No cough, wheezing, chills or hemoptysis; No Shortness of Breath  CARDIOVASCULAR: No chest pain, palpitations, passing out,   GASTROINTESTINAL: No abdominal or epigastric pain. No nausea, vomiting, or hematemesis;  GENITOURINARY: No dysuria, frequency, hematuria,  NEUROLOGICAL: No headaches,     Medications:  MEDICATIONS  (STANDING):  allopurinol 100 milliGRAM(s) Oral daily  apixaban 5 milliGRAM(s) Oral two times a day  aspirin enteric coated 81 milliGRAM(s) Oral daily  atorvastatin 40 milliGRAM(s) Oral at bedtime  chlorhexidine 2% Cloths 1 Application(s) Topical <User Schedule>  chlorhexidine 4% Liquid 1 Application(s) Topical <User Schedule>  dextrose 50% Injectable 25 Gram(s) IV Push once  dextrose 50% Injectable 25 Gram(s) IV Push once  dextrose 50% Injectable 12.5 Gram(s) IV Push once  diltiazem    milliGRAM(s) Oral daily  hydrALAZINE 25 milliGRAM(s) Oral three times a day  insulin lispro (ADMELOG) corrective regimen sliding scale   SubCutaneous at bedtime  insulin lispro (ADMELOG) corrective regimen sliding scale   SubCutaneous three times a day before meals  lidocaine   4% Patch 1 Patch Transdermal daily  metoprolol succinate  milliGRAM(s) Oral daily  polyethylene glycol 3350 17 Gram(s) Oral two times a day  senna 2 Tablet(s) Oral at bedtime  sevelamer carbonate 1600 milliGRAM(s) Oral three times a day with meals    MEDICATIONS  (PRN):  bisacodyl 5 milliGRAM(s) Oral every 12 hours PRN Constipation  dextrose Oral Gel 15 Gram(s) Oral once PRN Blood Glucose LESS THAN 70 milliGRAM(s)/deciliter  sodium chloride 0.9% lock flush 10 milliLiter(s) IV Push every 1 hour PRN Pre/post blood products, medications, blood draw, and to maintain line patency    	    PHYSICAL EXAM:  T(C): 36.5 (04-23-22 @ 12:06), Max: 36.8 (04-22-22 @ 14:17)  HR: 101 (04-23-22 @ 12:06) (99 - 119)  BP: 139/80 (04-23-22 @ 12:06) (132/54 - 163/72)  RR: 18 (04-23-22 @ 12:06) (18 - 18)  SpO2: 98% (04-23-22 @ 12:06) (94% - 98%)  Wt(kg): --  I&O's Summary    22 Apr 2022 07:01  -  23 Apr 2022 07:00  --------------------------------------------------------  IN: 120 mL / OUT: 0 mL / NET: 120 mL        Appearance: Normal	  HEENT:   Normal oral mucosa, PERRL, EOMI	    Cardiovascular: reg irreg  Respiratory: Lungs clear to auscultation	  Psychiatry: A & O   Gastrointestinal:  Soft, Non-tender, + BS	    Neurologic: Non-focal  Extremities:dec rom  lue avf   pvd   TELEMETRY: 	    ECG:  	  RADIOLOGY:  OTHER: 	  	  LABS:	 	    CARDIAC MARKERS:                                9.3    12.91 )-----------( 227      ( 23 Apr 2022 07:16 )             32.4     04-23    134<L>  |  94<L>  |  68<H>  ----------------------------<  147<H>  4.6   |  19<L>  |  7.94<H>    Ca    8.9      23 Apr 2022 07:09      proBNP:   Lipid Profile:   HgA1c:   TSH:

## 2022-04-23 NOTE — PROGRESS NOTE ADULT - SUBJECTIVE AND OBJECTIVE BOX
[facility]   [phone]  RENAL FOLLOW UP NOTE  --------------------------------------------------------------------------------  Chief Complaint:    24 hour events/subjective:    seen earlier  wife at bedside states ate today and is more communicative     PAST HISTORY  --------------------------------------------------------------------------------  No significant changes to PMH, PSH, FHx, SHx, unless otherwise noted    ALLERGIES & MEDICATIONS  --------------------------------------------------------------------------------  Allergies    nitrofurantoin (Nephrotoxicity)    Intolerances      Standing Inpatient Medications  allopurinol 100 milliGRAM(s) Oral daily  apixaban 5 milliGRAM(s) Oral two times a day  aspirin enteric coated 81 milliGRAM(s) Oral daily  atorvastatin 40 milliGRAM(s) Oral at bedtime  chlorhexidine 2% Cloths 1 Application(s) Topical <User Schedule>  chlorhexidine 4% Liquid 1 Application(s) Topical <User Schedule>  dextrose 50% Injectable 25 Gram(s) IV Push once  dextrose 50% Injectable 25 Gram(s) IV Push once  dextrose 50% Injectable 12.5 Gram(s) IV Push once  diltiazem    milliGRAM(s) Oral daily  hydrALAZINE 25 milliGRAM(s) Oral three times a day  insulin lispro (ADMELOG) corrective regimen sliding scale   SubCutaneous at bedtime  insulin lispro (ADMELOG) corrective regimen sliding scale   SubCutaneous three times a day before meals  lidocaine   4% Patch 1 Patch Transdermal daily  metoprolol succinate  milliGRAM(s) Oral daily  polyethylene glycol 3350 17 Gram(s) Oral two times a day  senna 2 Tablet(s) Oral at bedtime  sevelamer carbonate 1600 milliGRAM(s) Oral three times a day with meals    PRN Inpatient Medications  bisacodyl 5 milliGRAM(s) Oral every 12 hours PRN  dextrose Oral Gel 15 Gram(s) Oral once PRN  sodium chloride 0.9% lock flush 10 milliLiter(s) IV Push every 1 hour PRN      REVIEW OF SYSTEMS  --------------------------------------------------------------------------------    Gen: denies fatigue, fevers/chills,  CVS: denies chest pain/palpitations  Resp: denies SOB/Cough  GI: Denies N/V/Abd pain  : Denies dysuria/oliguria/hematuria    All other systems were reviewed and are negative, except as noted.    VITALS/PHYSICAL EXAM  --------------------------------------------------------------------------------  T(C): 36.5 (04-23-22 @ 12:06), Max: 36.8 (04-23-22 @ 03:04)  HR: 101 (04-23-22 @ 12:06) (99 - 119)  BP: 139/80 (04-23-22 @ 12:06) (139/80 - 163/72)  RR: 18 (04-23-22 @ 12:06) (18 - 18)  SpO2: 98% (04-23-22 @ 12:06) (94% - 98%)  Wt(kg): --        04-22-22 @ 07:01  -  04-23-22 @ 07:00  --------------------------------------------------------  IN: 120 mL / OUT: 0 mL / NET: 120 mL      Physical Exam:  	    	              Gen: Appears comfortable, but overall still lethargic   	Pulm: Decreased breath sounds b/l bases.  	CV: No JVD. IRR, tachy  	Abd: +BS, soft, nontender, softly distended, obese               Extremity UE: increased warmth, R hand, increased warmth, swollen              Extremity LE: + hyperpigmented bilateral LE with B/L non-pitting edema- improved, B/L LE tender on palpation              Vascular: R IJ HD catheter, L arm AVF       LABS/STUDIES  --------------------------------------------------------------------------------              9.3    12.91 >-----------<  227      [04-23-22 @ 07:16]              32.4     134  |  94  |  68  ----------------------------<  147      [04-23-22 @ 07:09]  4.6   |  19  |  7.94        Ca     8.9     [04-23-22 @ 07:09]      PT/INR: PT 13.8 , INR 1.20       [04-21-22 @ 16:20]  PTT: 152.6      [04-21-22 @ 16:20]      Creatinine Trend:  SCr 7.94 [04-23 @ 07:09]  SCr 5.93 [04-22 @ 07:23]  SCr 7.42 [04-21 @ 06:50]  SCr 5.83 [04-20 @ 07:13]  SCr 5.36 [04-19 @ 23:51]              Urinalysis - [04-03-22 @ 00:48]      Color Light Orange / Appearance Turbid / SG 1.021 / pH 5.5      Gluc Negative / Ketone Negative  / Bili Negative / Urobili Negative       Blood Large / Protein 300 mg/dL / Leuk Est Large / Nitrite Negative      RBC 20 /  / Hyaline 10 / Gran  / Sq Epi  / Non Sq Epi 3 / Bacteria Negative      Iron 16, TIBC 239, %sat 7      [04-15-22 @ 10:16]  Ferritin 172      [04-15-22 @ 09:05]  PTH -- (Ca 8.3)      [04-15-22 @ 12:18]   150  PTH -- (Ca --)      [04-03-22 @ 23:06]   97  HbA1c 11.7      [11-07-19 @ 09:14]  TSH 1.98      [04-05-22 @ 05:19]    DEREJE: titer Negative, pattern Negative      [04-11-22 @ 16:24]  C3 Complement 61      [04-11-22 @ 16:56]  C4 Complement 28      [04-11-22 @ 16:56]  ANCA: cANCA Negative, pANCA Negative, atypical ANCA Indeterminate Method interference due to DEREJE fluorescence      [04-11-22 @ 16:24]  anti-GBM 3      [04-11-22 @ 19:27]  Free Light Chains: kappa 16.92, lambda 12.63, ratio = 1.34      [04-11 @ 16:56]  Immunofixation Serum: No Monoclonal Band Identified    Reference Range: None Detected      [04-11-22 @ 16:56]  SPEP Interpretation: Normal Electrophoresis Pattern      [04-11-22 @ 16:56]

## 2022-04-23 NOTE — PROGRESS NOTE ADULT - ASSESSMENT
A/P    66 y/o M with history of CAD, s/p multiple PCI, with most recent 2/22 PCI of LAD in setting of NSTEMI, on ASA only (pradaxa), chronic atrial fibrillation maintained on Pradaxa, essential HTN, type 2 DM previously on oral medications but on insulin, diabetic neuropathy with chronic RIGHT LE venous stasis changes>left, LEFT foot ulcer seen by podiatry, past endarterectomy LEFT CEA in 2014 presenting with 1 week of decreased urine output, cystitis with hematuria     #ANT on CKD, new HD  -oliguric renal failure in setting of ? UTI/cystitis, r/o obstruction ? secondary to abx   -worsened with diuretic use but unlikely due to hypovolemia alone from diuretic use   -hyperkalemia improved   -New HD for uremia, renal failure  -renal f/u -sp rrt 4/12 for uncontrolled bleeding sp  right groin shiley removal  - monitor h/h - stable  -s/p L AVG 4/18 -- vasc f/u   -s/p permacath placement 4/20 -bleeding noted from site - IR f/u   -renal f/ u    #AMS/Lethargy  -events noted  -med f/u     #Acute on chronic HFpEF  -stable  -continue aggressive volume removal with HD  -c/w  bb    #Chronic AF  -rates overall stable   -c/w metoprolol succ 100 mg qd  -c/w dilt cd 120mg daily    -c/w eliquis 5 mg BID for now - heme stable     #HTN  -stable  -c/w meds   -increase hydral if bp remains elevated     #CAD, s/p PCI, most recent 2/2022  -stable, cont asa  -off plavix due to a/c indication  -statin     #R carotid stenosis  -cont med tx  -MRA noted with Greater than 75% stenosis of the right distal common carotid artery. Approximately 60% stenosis of the proximal left internal carotid artery.  -Continue ASA and Statin Therapy  -vasc outpt f/u Dr Pinto    plan discussed with family at bedside       35 minutes spent on total encounter; more than 50% of the visit was spent counseling and/or coordinating care by the attending physician.

## 2022-04-23 NOTE — PROGRESS NOTE ADULT - SUBJECTIVE AND OBJECTIVE BOX
CARDIOLOGY FOLLOW UP NOTE - DR. SAGASTUME    Patient Name: DYLAN DEL CASTILLO  Date of Service: 04-23-22 @ 11:56    Patient seen and examined  drowsy     Subjective:    cv: denies chest pain, dyspnea, palpitations, dizziness  pulmonary: denies cough  GI: denies abdominal pain, nausea, vomiting  vascular/legs: no edema   skin: no rash  ROS: otherwise negative   overnight events:      PHYSICAL EXAM:  T(C): 36.4 (04-23-22 @ 04:45), Max: 36.8 (04-22-22 @ 14:17)  HR: 119 (04-23-22 @ 04:45) (99 - 119)  BP: 150/76 (04-23-22 @ 04:45) (132/54 - 163/72)  RR: 18 (04-23-22 @ 04:45) (18 - 18)  SpO2: 97% (04-23-22 @ 04:45) (94% - 97%)  Wt(kg): --  I&O's Summary    22 Apr 2022 07:01  -  23 Apr 2022 07:00  --------------------------------------------------------  IN: 120 mL / OUT: 0 mL / NET: 120 mL      Daily     Daily     Appearance: Normal	  Cardiovascular: Normal S1 S2,RRR, No JVD, No murmurs  Respiratory: Lungs clear to auscultation	  Gastrointestinal:  Soft, Non-tender, + BS	  Extremities: Normal range of motion, No clubbing, cyanosis or edema      Home Medications:  allopurinol 100 mg oral tablet: 1 tab(s) orally once a day (03 Apr 2022 06:34)  aspirin 81 mg oral delayed release tablet: 1 tab(s) orally once a day (03 Apr 2022 06:34)  bumetanide 2 mg oral tablet: 1 tab(s) orally once a day (03 Apr 2022 06:34)  insulin glargine 100 units/mL subcutaneous solution: 25 unit(s) subcutaneous once a day (at bedtime) (03 Apr 2022 06:34)  metOLazone 5 mg oral tablet: 1 tab(s) orally 3 times a week (03 Apr 2022 06:34)  metoprolol succinate 50 mg oral tablet, extended release: 2 tab(s) orally once a day (03 Apr 2022 06:34)  NovoLOG 100 units/mL subcutaneous solution: subcutaneous 4 times a day (before meals and at bedtime) (03 Apr 2022 06:34)  NovoLOG FlexPen 100 units/mL injectable solution: 10 unit(s) subcutaneous 3 times a day (03 Apr 2022 06:34)  oxycodone-acetaminophen 5 mg-325 mg oral tablet: 1 tab(s) orally 2 times a day (03 Apr 2022 06:34)  Pradaxa 150 mg oral capsule: 1 cap(s) orally 2 times a day (03 Apr 2022 06:34)  pregabalin 100 mg oral capsule: 1 cap(s) orally 3 times a day (03 Apr 2022 06:34)      MEDICATIONS  (STANDING):  allopurinol 100 milliGRAM(s) Oral daily  apixaban 5 milliGRAM(s) Oral two times a day  aspirin enteric coated 81 milliGRAM(s) Oral daily  atorvastatin 40 milliGRAM(s) Oral at bedtime  chlorhexidine 2% Cloths 1 Application(s) Topical <User Schedule>  chlorhexidine 4% Liquid 1 Application(s) Topical <User Schedule>  dextrose 50% Injectable 25 Gram(s) IV Push once  dextrose 50% Injectable 25 Gram(s) IV Push once  dextrose 50% Injectable 12.5 Gram(s) IV Push once  diltiazem    milliGRAM(s) Oral daily  hydrALAZINE 25 milliGRAM(s) Oral three times a day  insulin lispro (ADMELOG) corrective regimen sliding scale   SubCutaneous three times a day before meals  insulin lispro (ADMELOG) corrective regimen sliding scale   SubCutaneous at bedtime  lidocaine   4% Patch 1 Patch Transdermal daily  metoprolol succinate  milliGRAM(s) Oral daily  polyethylene glycol 3350 17 Gram(s) Oral two times a day  senna 2 Tablet(s) Oral at bedtime  sevelamer carbonate 1600 milliGRAM(s) Oral three times a day with meals      TELEMETRY: 	    ECG:  	  RADIOLOGY:   DIAGNOSTIC TESTING:  [ ] Echocardiogram:  [ ] Catheterization:  [ ] Stress Test:    OTHER: 	    LABS:	 	    CARDIAC MARKERS:                                      9.3    12.91 )-----------( 227      ( 23 Apr 2022 07:16 )             32.4     04-23    134<L>  |  94<L>  |  68<H>  ----------------------------<  147<H>  4.6   |  19<L>  |  7.94<H>    Ca    8.9      23 Apr 2022 07:09      proBNP:   PT/INR - ( 21 Apr 2022 16:20 )   PT: 13.8 sec;   INR: 1.20 ratio         PTT - ( 21 Apr 2022 16:20 )  PTT:152.6 sec  Lipid Profile:   HgA1c:     Creatinine, Serum: 7.94 mg/dL (04-23-22 @ 07:09)  Creatinine, Serum: 5.93 mg/dL (04-22-22 @ 07:23)  Creatinine, Serum: 7.42 mg/dL (04-21-22 @ 06:50)

## 2022-04-24 NOTE — PROGRESS NOTE ADULT - ASSESSMENT
·  Problem: Acute kidney injury superimposed on CKD. pt currently on hd  to cont as per renal  avf done luext   perm cath done        Lethargy resolving  hold Lyrica till tomorrow  - CT head no acute event       Problem/Plan - 2:  ·  Problem: Chronic atrial fibrillation. c/w a/c  cards f/u     Problem/Plan - 3:  ·  Problem: Cystitis.   treatment as per id  s/p abs     Problem/Plan - 4:  ·  Problem: Type 2 diabetes mellitus. c/w insulin   endo f/u     Problem/Plan - 5:  ·  Problem: CAD (coronary artery disease).   stable  cards f/u       s/p shiley removal / groin w/ bleeding resolved      constipation better  bowel regimen      gouty pain  better    discussed with patient's daughter at bedside

## 2022-04-24 NOTE — PROGRESS NOTE ADULT - ASSESSMENT
67 year old gentleman with PMH of CAD, NSTEMI s/p 3 stents in 2014 (stents to LAD, proximal and distal LCx), ischemic cardiomyopathy, HTN for 10 years, T2DM for 26 years c/b peripheral neuropathy, CVA, TIA, Afib on Pradaxa, chronic RLE wound, L carotid stenosis s/p endarterectomy (2014) just recently admitted  to the [facility] on 2/19/2022 with acute onset chest pain for one day found to have an NSTEMI, CAD, s/p PCI in setting of increased AF rates off bb for 3 days and resolved chest pain, his CKMB peaked and Echo noted with EF 60%,normal LV function, mild TR, mild DC. He was s/p [facility] with 85% proximal LAD s/p balloon angioplasty and LULU. He presented to [facility] ED with decreased urine output over the week. [facility]. Stevens went to [facility] for hematuria and was started  on Nitrofurantoin. Pt is still taking Nitrofurantoin w/ 5 days left. He came to the ED due to worsening symptoms. Pt reports having a similar incident 4-5 years ago that resolved spontaneously. He reports increased leg edema Dyspnea worse on exertion. his baseline Serum creatinine is in the 2.0 to 2.3 mg/dl range. ANT with serum creatinine of 8.29 with bun 144 and k 5.5        1- ANT on ckd III   2- chf chronic   3- hyperphosphatemia /shpt   4- anemia   5- hyperlipidemia   6- HTN /a fib  7- TIA hx       No HD today  renvela 2 tab with meals   anemia - epogen [id number] Units TIW with HD.  responsive off lyrica  PT  permcath site dry   anticoagulation / DAPT therefore unable to undergo renal bx for his ANT. he does not appear to be recovering his renal function at present   d/w pt daughter at bedside

## 2022-04-24 NOTE — PROGRESS NOTE ADULT - SUBJECTIVE AND OBJECTIVE BOX
[facility]   [phone]  RENAL FOLLOW UP NOTE  --------------------------------------------------------------------------------  Chief Complaint:    24 hour events/subjective:    seen earlier   pt daughter at bedside  states he is more responsive and eating a little more     PAST HISTORY  --------------------------------------------------------------------------------  No significant changes to PMH, PSH, FHx, SHx, unless otherwise noted    ALLERGIES & MEDICATIONS  --------------------------------------------------------------------------------  Allergies    nitrofurantoin (Nephrotoxicity)    Intolerances      Standing Inpatient Medications  allopurinol 100 milliGRAM(s) Oral daily  apixaban 5 milliGRAM(s) Oral two times a day  aspirin enteric coated 81 milliGRAM(s) Oral daily  atorvastatin 40 milliGRAM(s) Oral at bedtime  chlorhexidine 2% Cloths 1 Application(s) Topical <User Schedule>  chlorhexidine 4% Liquid 1 Application(s) Topical <User Schedule>  dextrose 50% Injectable 25 Gram(s) IV Push once  dextrose 50% Injectable 25 Gram(s) IV Push once  dextrose 50% Injectable 12.5 Gram(s) IV Push once  diltiazem    milliGRAM(s) Oral daily  hydrALAZINE 25 milliGRAM(s) Oral three times a day  insulin lispro (ADMELOG) corrective regimen sliding scale   SubCutaneous three times a day before meals  insulin lispro (ADMELOG) corrective regimen sliding scale   SubCutaneous at bedtime  lidocaine   4% Patch 1 Patch Transdermal daily  metoprolol succinate  milliGRAM(s) Oral daily  polyethylene glycol 3350 17 Gram(s) Oral two times a day  povidone iodine 10% Solution 1 Application(s) Topical daily  senna 2 Tablet(s) Oral at bedtime  sevelamer carbonate 1600 milliGRAM(s) Oral three times a day with meals    PRN Inpatient Medications  bisacodyl 5 milliGRAM(s) Oral every 12 hours PRN  dextrose Oral Gel 15 Gram(s) Oral once PRN  sodium chloride 0.9% lock flush 10 milliLiter(s) IV Push every 1 hour PRN      REVIEW OF SYSTEMS  --------------------------------------------------------------------------------    Gen: denies fevers/chills,  CVS: denies chest pain/palpitations  Resp: denies SOB/Cough  GI: Denies N/V/Abd pain  : Denies dysuria      VITALS/PHYSICAL EXAM  --------------------------------------------------------------------------------  T(C): 37.1 (04-24-22 @ 15:20), Max: 37.1 (04-24-22 @ 15:20)  HR: 92 (04-24-22 @ 15:20) (92 - 128)  BP: 148/61 (04-24-22 @ 15:20) (133/72 - 148/61)  RR: 18 (04-24-22 @ 15:20) (16 - 18)  SpO2: 96% (04-24-22 @ 15:20) (94% - 98%)  Wt(kg): --        04-23-22 @ 07:01  -  04-24-22 @ 07:00  --------------------------------------------------------  IN: 0 mL / OUT: 1500 mL / NET: -1500 mL      Physical Exam:  	              Gen: Appears comfortable, but overall  less lethargic   	Pulm: Decreased breath sounds b/l bases.  	CV: No JVD. IRR, tachy  	Abd: +BS, soft, nontender, softly distended, obese               Extremity UE: increased warmth, R hand, increased warmth, swollen              Extremity LE: + hyperpigmented bilateral LE with B/L  trace non-pitting edema- improved, B/L LE tender on palpation              Vascular: R IJ HD catheter, L arm AVF     LABS/STUDIES  --------------------------------------------------------------------------------              9.3    12.48 >-----------<  261      [04-24-22 @ 07:13]              32.1     138  |  100  |  47  ----------------------------<  157      [04-24-22 @ 07:13]  4.5   |  20  |  5.91        Ca     9.0     [04-24-22 @ 07:13]            Creatinine Trend:  SCr 5.91 [04-24 @ 07:13]  SCr 7.94 [04-23 @ 07:09]  SCr 5.93 [04-22 @ 07:23]  SCr 7.42 [04-21 @ 06:50]  SCr 5.83 [04-20 @ 07:13]              Urinalysis - [04-03-22 @ 00:48]      Color Light Orange / Appearance Turbid / SG 1.021 / pH 5.5      Gluc Negative / Ketone Negative  / Bili Negative / Urobili Negative       Blood Large / Protein 300 mg/dL / Leuk Est Large / Nitrite Negative      RBC 20 /  / Hyaline 10 / Gran  / Sq Epi  / Non Sq Epi 3 / Bacteria Negative      Iron 16, TIBC 239, %sat 7      [04-15-22 @ 10:16]  Ferritin 172      [04-15-22 @ 09:05]  PTH -- (Ca 8.3)      [04-15-22 @ 12:18]   150  PTH -- (Ca --)      [04-03-22 @ 23:06]   97  HbA1c 11.7      [11-07-19 @ 09:14]  TSH 1.98      [04-05-22 @ 05:19]    DEREJE: titer Negative, pattern Negative      [04-11-22 @ 16:24]  C3 Complement 61      [04-11-22 @ 16:56]  C4 Complement 28      [04-11-22 @ 16:56]  ANCA: cANCA Negative, pANCA Negative, atypical ANCA Indeterminate Method interference due to DEREJE fluorescence      [04-11-22 @ 16:24]  anti-GBM 3      [04-11-22 @ 19:27]  Free Light Chains: kappa 16.92, lambda 12.63, ratio = 1.34      [04-11 @ 16:56]  Immunofixation Serum: No Monoclonal Band Identified    Reference Range: None Detected      [04-11-22 @ 16:56]  SPEP Interpretation: Normal Electrophoresis Pattern      [04-11-22 @ 16:56]

## 2022-04-24 NOTE — PROGRESS NOTE ADULT - ASSESSMENT
A/P    68 y/o M with history of CAD, s/p multiple PCI, with most recent 2/22 PCI of LAD in setting of NSTEMI, on ASA only (pradaxa), chronic atrial fibrillation maintained on Pradaxa, essential HTN, type 2 DM previously on oral medications but on insulin, diabetic neuropathy with chronic RIGHT LE venous stasis changes>left, LEFT foot ulcer seen by podiatry, past endarterectomy LEFT CEA in 2014 presenting with 1 week of decreased urine output, cystitis with hematuria     #ANT on CKD, new HD  -oliguric renal failure in setting of UTI/cystitis  -etiology ??  -New HD for uremia, renal failure  -sp rrt 4/12 for uncontrolled bleeding sp  right groin shiley removal  - monitor h/h - stable  -s/p L AVG 4/18  -s/p permacath placement 4/20 -bleeding noted from site - IR f/u   -renal f/ u    #AMS/Lethargy  -improved  -recent ams from pain meds  -med f/u     #Acute on chronic HFpEF  -stable  -continue aggressive volume removal with HD  -c/w  bb    #Chronic AF  -rates overall stable   -c/w metoprolol succ 100 mg qd  -c/w dilt cd 120mg daily    -c/w eliquis 5 mg BID for now - heme stable     #HTN  -stable  -c/w meds   -increase hydral if bp remains elevated     #CAD, s/p PCI, most recent 2/2022  -stable, cont asa  -off plavix due to a/c indication  -statin     #R carotid stenosis  -cont med tx  -MRA noted with Greater than 75% stenosis of the right distal common carotid artery. Approximately 60% stenosis of the proximal left internal carotid artery.  -Continue ASA and Statin Therapy  -vasc outpt f/u Dr Pinto    plan discussed with family at bedside     NEEDS AGG PT    35 minutes spent on total encounter; more than 50% of the visit was spent counseling and/or coordinating care by the attending physician.

## 2022-04-24 NOTE — PROGRESS NOTE ADULT - SUBJECTIVE AND OBJECTIVE BOX
DATE OF SERVICE: 04-24-22 @ 11:10  CHIEF COMPLAINT:Patient is a 67y old  Male who presents with a chief complaint of Decreased urine output for the past week, leg oedema (23 Apr 2022 15:54)    	        PAST MEDICAL & SURGICAL HISTORY:  HTN (hypertension), benign    HLD (hyperlipidemia)    DM type 2 (diabetes mellitus, type 2)    TIA (transient ischemic attack)    Atrial fibrillation    MI (myocardial infarction)  circa 2014 and 2022.    CAD (coronary artery disease)    Neuropathy    Stage 3 chronic kidney disease    2019 novel coronavirus disease (COVID-19)  12/2021.  Received the COVID-19 vaccine x 2 as of April 2022.    Status post angioplasty with stent  LULU x 3 2/7/2014    S/P carotid endarterectomy  left            more awake   pain seems better  RESPIRATORY: No cough, wheezing, chills or hemoptysis; No Shortness of Breath  CARDIOVASCULAR: No chest pain, palpitations, passing out,  GASTROINTESTINAL: No abdominal or epigastric pain.   GENITOURINARY: No dysuria, frequency, hematuria,   NEUROLOGICAL: No headaches,    Medications:  MEDICATIONS  (STANDING):  allopurinol 100 milliGRAM(s) Oral daily  apixaban 5 milliGRAM(s) Oral two times a day  aspirin enteric coated 81 milliGRAM(s) Oral daily  atorvastatin 40 milliGRAM(s) Oral at bedtime  chlorhexidine 2% Cloths 1 Application(s) Topical <User Schedule>  chlorhexidine 4% Liquid 1 Application(s) Topical <User Schedule>  dextrose 50% Injectable 25 Gram(s) IV Push once  dextrose 50% Injectable 25 Gram(s) IV Push once  dextrose 50% Injectable 12.5 Gram(s) IV Push once  diltiazem    milliGRAM(s) Oral daily  hydrALAZINE 25 milliGRAM(s) Oral three times a day  insulin lispro (ADMELOG) corrective regimen sliding scale   SubCutaneous three times a day before meals  insulin lispro (ADMELOG) corrective regimen sliding scale   SubCutaneous at bedtime  lidocaine   4% Patch 1 Patch Transdermal daily  metoprolol succinate  milliGRAM(s) Oral daily  polyethylene glycol 3350 17 Gram(s) Oral two times a day  senna 2 Tablet(s) Oral at bedtime  sevelamer carbonate 1600 milliGRAM(s) Oral three times a day with meals    MEDICATIONS  (PRN):  bisacodyl 5 milliGRAM(s) Oral every 12 hours PRN Constipation  dextrose Oral Gel 15 Gram(s) Oral once PRN Blood Glucose LESS THAN 70 milliGRAM(s)/deciliter  sodium chloride 0.9% lock flush 10 milliLiter(s) IV Push every 1 hour PRN Pre/post blood products, medications, blood draw, and to maintain line patency    	    PHYSICAL EXAM:  T(C): 36.8 (04-24-22 @ 10:59), Max: 36.9 (04-24-22 @ 05:24)  HR: 102 (04-24-22 @ 10:59) (101 - 128)  BP: 134/73 (04-24-22 @ 10:59) (133/72 - 146/68)  RR: 18 (04-24-22 @ 10:59) (16 - 18)  SpO2: 95% (04-24-22 @ 10:59) (94% - 98%)  Wt(kg): --  I&O's Summary    23 Apr 2022 07:01  -  24 Apr 2022 07:00  --------------------------------------------------------  IN: 0 mL / OUT: 1500 mL / NET: -1500 mL        	  HEENT:   Normal oral mucosa, PERRL, EOMI	    Cardiovascular: Normal S1 S2, No JVD,   Respiratory: Lungs clear to auscultation	  Psychiatry: A & O   Gastrointestinal:  Soft, Non-tender, + BS	    Neurologic: Non-focal  Extremities: dec rom   pvd   luext avf    TELEMETRY: 	    ECG:  	  RADIOLOGY:  OTHER: 	  	  LABS:	 	    CARDIAC MARKERS:                                9.3    12.48 )-----------( 261      ( 24 Apr 2022 07:13 )             32.1     04-24    138  |  100  |  47<H>  ----------------------------<  157<H>  4.5   |  20<L>  |  5.91<H>    Ca    9.0      24 Apr 2022 07:13      proBNP:   Lipid Profile:   HgA1c:   TSH:

## 2022-04-24 NOTE — PROGRESS NOTE ADULT - SUBJECTIVE AND OBJECTIVE BOX
CARDIOLOGY FOLLOW UP NOTE - DR. SAGASTUME    Patient Name: DYLAN DEL CASTILLO  Date of Service: 04-24-22 @ 12:01    Patient seen and examined  more awake    Subjective:    cv: denies chest pain, dyspnea, palpitations, dizziness  pulmonary: denies cough  GI: denies abdominal pain, nausea, vomiting  vascular/legs: no edema   skin: no rash  ROS: otherwise negative   overnight events:      PHYSICAL EXAM:  T(C): 36.8 (04-24-22 @ 10:59), Max: 36.9 (04-24-22 @ 05:24)  HR: 102 (04-24-22 @ 10:59) (101 - 128)  BP: 134/73 (04-24-22 @ 10:59) (133/72 - 146/68)  RR: 18 (04-24-22 @ 10:59) (16 - 18)  SpO2: 95% (04-24-22 @ 10:59) (94% - 98%)  Wt(kg): --  I&O's Summary    23 Apr 2022 07:01  -  24 Apr 2022 07:00  --------------------------------------------------------  IN: 0 mL / OUT: 1500 mL / NET: -1500 mL      Daily     Daily     Appearance: Normal	  Cardiovascular: Normal S1 S2,RRR, No JVD, No murmurs  Respiratory: Lungs clear to auscultation	  Gastrointestinal:  Soft, Non-tender, + BS	  Extremities: Normal range of motion, edema       Home Medications:  allopurinol 100 mg oral tablet: 1 tab(s) orally once a day (03 Apr 2022 06:34)  aspirin 81 mg oral delayed release tablet: 1 tab(s) orally once a day (03 Apr 2022 06:34)  bumetanide 2 mg oral tablet: 1 tab(s) orally once a day (03 Apr 2022 06:34)  insulin glargine 100 units/mL subcutaneous solution: 25 unit(s) subcutaneous once a day (at bedtime) (03 Apr 2022 06:34)  metOLazone 5 mg oral tablet: 1 tab(s) orally 3 times a week (03 Apr 2022 06:34)  metoprolol succinate 50 mg oral tablet, extended release: 2 tab(s) orally once a day (03 Apr 2022 06:34)  NovoLOG 100 units/mL subcutaneous solution: subcutaneous 4 times a day (before meals and at bedtime) (03 Apr 2022 06:34)  NovoLOG FlexPen 100 units/mL injectable solution: 10 unit(s) subcutaneous 3 times a day (03 Apr 2022 06:34)  oxycodone-acetaminophen 5 mg-325 mg oral tablet: 1 tab(s) orally 2 times a day (03 Apr 2022 06:34)  Pradaxa 150 mg oral capsule: 1 cap(s) orally 2 times a day (03 Apr 2022 06:34)  pregabalin 100 mg oral capsule: 1 cap(s) orally 3 times a day (03 Apr 2022 06:34)      MEDICATIONS  (STANDING):  allopurinol 100 milliGRAM(s) Oral daily  apixaban 5 milliGRAM(s) Oral two times a day  aspirin enteric coated 81 milliGRAM(s) Oral daily  atorvastatin 40 milliGRAM(s) Oral at bedtime  chlorhexidine 2% Cloths 1 Application(s) Topical <User Schedule>  chlorhexidine 4% Liquid 1 Application(s) Topical <User Schedule>  dextrose 50% Injectable 25 Gram(s) IV Push once  dextrose 50% Injectable 25 Gram(s) IV Push once  dextrose 50% Injectable 12.5 Gram(s) IV Push once  diltiazem    milliGRAM(s) Oral daily  hydrALAZINE 25 milliGRAM(s) Oral three times a day  insulin lispro (ADMELOG) corrective regimen sliding scale   SubCutaneous three times a day before meals  insulin lispro (ADMELOG) corrective regimen sliding scale   SubCutaneous at bedtime  lidocaine   4% Patch 1 Patch Transdermal daily  metoprolol succinate  milliGRAM(s) Oral daily  polyethylene glycol 3350 17 Gram(s) Oral two times a day  senna 2 Tablet(s) Oral at bedtime  sevelamer carbonate 1600 milliGRAM(s) Oral three times a day with meals      TELEMETRY: 	    ECG:  	  RADIOLOGY:   DIAGNOSTIC TESTING:  [ ] Echocardiogram:  [ ] Catheterization:  [ ] Stress Test:    OTHER: 	    LABS:	 	    CARDIAC MARKERS:                                      9.3    12.48 )-----------( 261      ( 24 Apr 2022 07:13 )             32.1     04-24    138  |  100  |  47<H>  ----------------------------<  157<H>  4.5   |  20<L>  |  5.91<H>    Ca    9.0      24 Apr 2022 07:13      proBNP:     Lipid Profile:   HgA1c:     Creatinine, Serum: 5.91 mg/dL (04-24-22 @ 07:13)  Creatinine, Serum: 7.94 mg/dL (04-23-22 @ 07:09)  Creatinine, Serum: 5.93 mg/dL (04-22-22 @ 07:23)

## 2022-04-25 NOTE — PROGRESS NOTE ADULT - SUBJECTIVE AND OBJECTIVE BOX
Follow-up Pulm Progress Note    No new respiratory events overnight.  Denies SOB/CP.   Sats 97% RA    Medications:  MEDICATIONS  (STANDING):  allopurinol 100 milliGRAM(s) Oral daily  apixaban 5 milliGRAM(s) Oral two times a day  aspirin enteric coated 81 milliGRAM(s) Oral daily  atorvastatin 40 milliGRAM(s) Oral at bedtime  chlorhexidine 2% Cloths 1 Application(s) Topical <User Schedule>  chlorhexidine 4% Liquid 1 Application(s) Topical <User Schedule>  dextrose 50% Injectable 25 Gram(s) IV Push once  dextrose 50% Injectable 25 Gram(s) IV Push once  dextrose 50% Injectable 12.5 Gram(s) IV Push once  diltiazem    milliGRAM(s) Oral daily  hydrALAZINE 25 milliGRAM(s) Oral three times a day  insulin lispro (ADMELOG) corrective regimen sliding scale   SubCutaneous three times a day before meals  insulin lispro (ADMELOG) corrective regimen sliding scale   SubCutaneous at bedtime  lidocaine   4% Patch 1 Patch Transdermal daily  metoprolol succinate  milliGRAM(s) Oral daily  polyethylene glycol 3350 17 Gram(s) Oral two times a day  povidone iodine 10% Solution 1 Application(s) Topical daily  senna 2 Tablet(s) Oral at bedtime  sevelamer carbonate 1600 milliGRAM(s) Oral three times a day with meals    MEDICATIONS  (PRN):  bisacodyl 5 milliGRAM(s) Oral every 12 hours PRN Constipation  dextrose Oral Gel 15 Gram(s) Oral once PRN Blood Glucose LESS THAN 70 milliGRAM(s)/deciliter  sodium chloride 0.9% lock flush 10 milliLiter(s) IV Push every 1 hour PRN Pre/post blood products, medications, blood draw, and to maintain line patency          Vital Signs Last 24 Hrs  T(C): 36.4 (25 Apr 2022 05:02), Max: 37.1 (24 Apr 2022 15:20)  T(F): 97.5 (25 Apr 2022 05:02), Max: 98.7 (24 Apr 2022 15:20)  HR: 105 (25 Apr 2022 05:02) (76 - 105)  BP: 142/72 (25 Apr 2022 05:02) (142/72 - 161/67)  BP(mean): --  RR: 18 (25 Apr 2022 05:02) (18 - 18)  SpO2: 97% (25 Apr 2022 05:02) (93% - 97%)          04-24 @ 07:01  -  04-25 @ 07:00  --------------------------------------------------------  IN: 118 mL / OUT: 0 mL / NET: 118 mL          LABS:                        9.4    13.96 )-----------( 292      ( 25 Apr 2022 07:14 )             31.5     04-25    137  |  98  |  69<H>  ----------------------------<  196<H>  5.0   |  19<L>  |  8.06<H>    Ca    8.9      25 Apr 2022 07:11  Phos  6.1     04-25  Mg     2.6     04-25    TPro  6.9  /  Alb  2.9<L>  /  TBili  0.4  /  DBili  x   /  AST  6<L>  /  ALT  <5<L>  /  AlkPhos  121<H>  04-25          CAPILLARY BLOOD GLUCOSE      POCT Blood Glucose.: 232 mg/dL (25 Apr 2022 08:02)            DEREJE Negative 04-11 @ 16:24  Anti SS-1 --  Anti SS-2 --  Anti RNP --  RF -- 04-11 @ 16:24    Atypical ANCA Indeterminate Method interference due to DEREJE fluorescence 04-11 @ 16:24  c-ANCA titer -- 04-11 @ 16:24  c-ANCA Negative 04-11 @ 16:24  p-ANCA Negative 04-11 @ 16:24          Physical Examination:  PULM: Clear to auscultation bilaterally, no significant sputum production  CVS: S1, S2 heard      RADIOLOGY REVIEWED  CXR 4/11: unchanged b/l effusions, atelectasis    CT chest: < from: CT Chest No Cont (04.04.22 @ 16:52) >  FINDINGS:    AIRWAYS, LUNGS, PLEURA: Tracheal secretions. Small left greater than   right pleural effusions increased compared to 2/19/2022. Right-sided   pleural thickening. Lingular and left greater than right basilar   opacification, likely atelectasis.    MEDIASTINUM: Cardiomegaly. No pericardial effusion. Thoracic aorta normal   caliber.  No large mediastinal lymph nodes.    IMAGED ABDOMEN: Nonspecific perinephric stranding.    SOFT TISSUES: Unremarkable.    BONES: Unremarkable.      IMPRESSION:.    Small left greater than right bilateral pleural effusions increased in   size compared to 2/19/2022.    Lingular and left greater than right basilar opacification, likely   atelectasis.    --- End of Report ---    < end of copied text >

## 2022-04-25 NOTE — PROGRESS NOTE ADULT - ASSESSMENT
·  Problem: Acute kidney injury superimposed on CKD. pt currently on hd  to cont as per renal  avf done luext   perm cath done        Lethargy resolving  hold Lyrica till tomorrow  - CT head no acute event       Problem/Plan - 2:  ·  Problem: Chronic atrial fibrillation. c/w a/c  cards f/u     Problem/Plan - 3:  ·  Problem: Cystitis.   treatment as per id  s/p abs     Problem/Plan - 4:  ·  Problem: Type 2 diabetes mellitus. c/w insulin   endo f/u     Problem/Plan - 5:  ·  Problem: CAD (coronary artery disease).   stable  cards f/u       s/p shiley removal / groin w/ bleeding resolved      constipation better  bowel regimen      gouty pain  better    discussed with patient's wife   at bedside      rehab planning

## 2022-04-25 NOTE — PROGRESS NOTE ADULT - ASSESSMENT
Patient is a 67 year old male with PMH of CAD with past multiple PCI, with most recent discharge from [place] in Feb 2022 for NSTEMI with LULU of an 85% prox LAD lesion with patient on ASA and now off Plavix per cardiology (only for one month), chronic afib on Pradaxa, HTN, type 2 DM on insulin, diabetic neuropathy with chronic RIGHT LE venous stasis changes>left, LEFT foot ulcer seen by podiatry, past endarterectomy LEFT CEA in 2014 who presented with UTI with hematuria diagnosed at urgent care and now with decreased urine output.   Plan to take patient to OR Monday 4/18 for LUE AVF creation, possible graft placement.    Acute cystitis  Leukocytosis  Noted dark urine with hematuria, went to UC and was prescribed macrobid, had some improvement in urine color but then noticed decreased urine output with no voiding reported in last 2 days  UA here with pyuria -  with large LE, negative nitrites and no bacteria. UCx/BCx NGTD  Imaging: CTAP reviewed - no hydronephrosis, nephrolithiasis, or evidence of obstructive uropathy  S/p ceftriaxone x7 day course completed 4/9  C/w monitoring off Abx.   Trend temps/WBC--Elevated WBC noted. No obv localizing concerns for new or recurrent infection.    ANT on CKD3   - Nephrology following, appreciate recs   - s/p placement of shiley and initiation of HD   - pending permcath placement   - monitor Cr    - renally dose meds    - avoid nephrotoxic agents    B/l LE edema with chronic venous stasis  Acute on chronic HFpEF   - legs cool to touch, nontender, chronic changes with no sign of infection/cellulitis   - has wound on bottom of L foot - dry and does not appear infected either   - clinically overloaded, Cardiology and Nephrology following   - elevate lower extremities      DM2 with neuropathy   - Blood glucose management per primary team.    Infectious Diseases will continue to follow. Please call with any questions.   Annie King M.D.  [facility], Division of Infectious Diseases [phone]     Patient is a 67 year old male with PMH of CAD with past multiple PCI, with most recent discharge from [place] in Feb 2022 for NSTEMI with LULU of an 85% prox LAD lesion with patient on ASA and now off Plavix per cardiology (only for one month), chronic afib on Pradaxa, HTN, type 2 DM on insulin, diabetic neuropathy with chronic RIGHT LE venous stasis changes>left, LEFT foot ulcer seen by podiatry, past endarterectomy LEFT CEA in 2014 who presented with UTI with hematuria diagnosed at urgent care and now with decreased urine output.   Plan to take patient to OR Monday 4/18 for LUE AVF creation, possible graft placement.    Acute cystitis  Leukocytosis  Noted dark urine with hematuria, went to UC and was prescribed macrobid, had some improvement in urine color but then noticed decreased urine output with no voiding reported in last 2 days  UA here with pyuria -  with large LE, negative nitrites and no bacteria. UCx/BCx NGTD  Imaging: CTAP reviewed - no hydronephrosis, nephrolithiasis, or evidence of obstructive uropathy  S/p ceftriaxone x7 day course completed 4/9  C/w monitoring off Abx.   Trend temps/WBC--Elevated WBC noted. No obv localizing concerns for new or recurrent infection.    ANT on CKD3   - Nephrology following, appreciate recs   - s/p placement of shiley and initiation of HD   - permcath placement 4/20   - monitor Cr    - renally dose meds    - avoid nephrotoxic agents    B/l LE edema with chronic venous stasis  Acute on chronic HFpEF   - legs cool to touch, nontender, chronic changes with no sign of infection/cellulitis   - has wound on bottom of L foot - dry and does not appear infected either   - clinically overloaded, Cardiology and Nephrology following   - elevate lower extremities      DM2 with neuropathy   - Blood glucose management per primary team.    Infectious Diseases will continue to follow. Please call with any questions.   Annie King M.D.  [facility], Division of Infectious Diseases [phone]

## 2022-04-25 NOTE — PROGRESS NOTE ADULT - ASSESSMENT
A/P    66 y/o M with history of CAD, s/p multiple PCI, with most recent 2/22 PCI of LAD in setting of NSTEMI, on ASA only (pradaxa), chronic atrial fibrillation maintained on Pradaxa, essential HTN, type 2 DM previously on oral medications but on insulin, diabetic neuropathy with chronic RIGHT LE venous stasis changes>left, LEFT foot ulcer seen by podiatry, past endarterectomy LEFT CEA in 2014 presenting with 1 week of decreased urine output, cystitis with hematuria     #ANT on CKD, new HD  -oliguric renal failure in setting of UTI/cystitis  -etiology ??  -New HD for uremia, renal failure  -sp rrt 4/12 for uncontrolled bleeding sp  right groin shiley removal  - monitor h/h - stable  -s/p L AVG 4/18  -s/p permacath placement 4/20  -renal f/ u    #AMS/Lethargy  -improved  -recent ams from pain meds  -med f/u     #Acute on chronic HFpEF  -stable  -continue aggressive volume removal with HD  -c/w  bb    #Chronic AF  -rates overall stable   -c/w metoprolol succ 100 mg qd  -c/w dilt cd 120mg daily    -c/w eliquis 5 mg BID for now - heme stable     #HTN  -stable  -c/w meds   -increase hydral if bp remains elevated     #CAD, s/p PCI, most recent 2/2022  -stable, cont asa  -off plavix due to a/c indication  -statin     #R carotid stenosis  -cont med tx  -MRA noted with Greater than 75% stenosis of the right distal common carotid artery. Approximately 60% stenosis of the proximal left internal carotid artery.  -Continue ASA and Statin Therapy  -vasc outpt f/u Dr Pinto    plan discussed with family at bedside

## 2022-04-25 NOTE — PROGRESS NOTE ADULT - SUBJECTIVE AND OBJECTIVE BOX
CARDIOLOGY FOLLOW UP - Dr. Mazariegos  DATE OF SERVICE: 4/25/22     CC no cp or sob       REVIEW OF SYSTEMS:  CONSTITUTIONAL: No fever, weight loss, or fatigue  RESPIRATORY: No cough, wheezing, chills or hemoptysis; No Shortness of Breath  CARDIOVASCULAR: No chest pain, palpitations, passing out, dizziness, or leg swelling  GASTROINTESTINAL: No abdominal or epigastric pain. No nausea, vomiting, or hematemesis; No diarrhea or constipation. No melena or hematochezia.      PHYSICAL EXAM:  T(C): 36.4 (04-25-22 @ 05:02), Max: 37.1 (04-24-22 @ 15:20)  HR: 105 (04-25-22 @ 05:02) (76 - 105)  BP: 142/72 (04-25-22 @ 05:02) (142/72 - 161/67)  RR: 18 (04-25-22 @ 05:02) (18 - 18)  SpO2: 97% (04-25-22 @ 05:02) (93% - 97%)  Wt(kg): --  I&O's Summary    24 Apr 2022 07:01  -  25 Apr 2022 07:00  --------------------------------------------------------  IN: 118 mL / OUT: 0 mL / NET: 118 mL        Appearance: Normal	  Cardiovascular: Normal S1 S2, irreg   Respiratory: Lungs clear to auscultation	  Gastrointestinal:  Soft, Non-tender, + BS	  Extremities: Normal range of motion, No clubbing, cyanosis or edema      Home Medications:  allopurinol 100 mg oral tablet: 1 tab(s) orally once a day (03 Apr 2022 06:34)  aspirin 81 mg oral delayed release tablet: 1 tab(s) orally once a day (03 Apr 2022 06:34)  bumetanide 2 mg oral tablet: 1 tab(s) orally once a day (03 Apr 2022 06:34)  insulin glargine 100 units/mL subcutaneous solution: 25 unit(s) subcutaneous once a day (at bedtime) (03 Apr 2022 06:34)  metOLazone 5 mg oral tablet: 1 tab(s) orally 3 times a week (03 Apr 2022 06:34)  metoprolol succinate 50 mg oral tablet, extended release: 2 tab(s) orally once a day (03 Apr 2022 06:34)  NovoLOG 100 units/mL subcutaneous solution: subcutaneous 4 times a day (before meals and at bedtime) (03 Apr 2022 06:34)  NovoLOG FlexPen 100 units/mL injectable solution: 10 unit(s) subcutaneous 3 times a day (03 Apr 2022 06:34)  oxycodone-acetaminophen 5 mg-325 mg oral tablet: 1 tab(s) orally 2 times a day (03 Apr 2022 06:34)  Pradaxa 150 mg oral capsule: 1 cap(s) orally 2 times a day (03 Apr 2022 06:34)  pregabalin 100 mg oral capsule: 1 cap(s) orally 3 times a day (03 Apr 2022 06:34)      MEDICATIONS  (STANDING):  allopurinol 100 milliGRAM(s) Oral daily  apixaban 5 milliGRAM(s) Oral two times a day  aspirin enteric coated 81 milliGRAM(s) Oral daily  atorvastatin 40 milliGRAM(s) Oral at bedtime  chlorhexidine 2% Cloths 1 Application(s) Topical <User Schedule>  chlorhexidine 4% Liquid 1 Application(s) Topical <User Schedule>  dextrose 50% Injectable 25 Gram(s) IV Push once  dextrose 50% Injectable 25 Gram(s) IV Push once  dextrose 50% Injectable 12.5 Gram(s) IV Push once  diltiazem    milliGRAM(s) Oral daily  hydrALAZINE 25 milliGRAM(s) Oral three times a day  insulin lispro (ADMELOG) corrective regimen sliding scale   SubCutaneous three times a day before meals  insulin lispro (ADMELOG) corrective regimen sliding scale   SubCutaneous at bedtime  lidocaine   4% Patch 1 Patch Transdermal daily  metoprolol succinate  milliGRAM(s) Oral daily  polyethylene glycol 3350 17 Gram(s) Oral two times a day  povidone iodine 10% Solution 1 Application(s) Topical daily  senna 2 Tablet(s) Oral at bedtime  sevelamer carbonate 1600 milliGRAM(s) Oral three times a day with meals      TELEMETRY: afib hr 80s 	    ECG:  	  RADIOLOGY:   DIAGNOSTIC TESTING:  [ ] Echocardiogram:  [ ]  Catheterization:  [ ] Stress Test:    OTHER: 	    LABS:	 	                            9.4    13.96 )-----------( 292      ( 25 Apr 2022 07:14 )             31.5     04-25    137  |  98  |  69<H>  ----------------------------<  196<H>  5.0   |  19<L>  |  8.06<H>    Ca    8.9      25 Apr 2022 07:11  Phos  6.1     04-25  Mg     2.6     04-25    TPro  6.9  /  Alb  2.9<L>  /  TBili  0.4  /  DBili  x   /  AST  6<L>  /  ALT  <5<L>  /  AlkPhos  121<H>  04-25

## 2022-04-25 NOTE — PROGRESS NOTE ADULT - SUBJECTIVE AND OBJECTIVE BOX
[facility], Division of Infectious Diseases  ADRIANA Seals Y. Patel, S. Shah, G. [facility]  [phone]    Name: DYLAN DEL CASTILLO  Age: 67y  Gender: Male  MRN: [id number]    Interval History:  Patient seen and examined at bedside this morning  No acute overnight events. Afebrile  No complaints  Wife at bedside  Notes reviewed    Antibiotics:      Medications:  allopurinol 100 milliGRAM(s) Oral daily  apixaban 5 milliGRAM(s) Oral two times a day  aspirin enteric coated 81 milliGRAM(s) Oral daily  atorvastatin 40 milliGRAM(s) Oral at bedtime  bisacodyl 5 milliGRAM(s) Oral every 12 hours PRN  chlorhexidine 2% Cloths 1 Application(s) Topical <User Schedule>  chlorhexidine 4% Liquid 1 Application(s) Topical <User Schedule>  dextrose 50% Injectable 25 Gram(s) IV Push once  dextrose 50% Injectable 25 Gram(s) IV Push once  dextrose 50% Injectable 12.5 Gram(s) IV Push once  dextrose Oral Gel 15 Gram(s) Oral once PRN  diltiazem    milliGRAM(s) Oral daily  hydrALAZINE 25 milliGRAM(s) Oral three times a day  insulin lispro (ADMELOG) corrective regimen sliding scale   SubCutaneous three times a day before meals  insulin lispro (ADMELOG) corrective regimen sliding scale   SubCutaneous at bedtime  lidocaine   4% Patch 1 Patch Transdermal daily  metoprolol succinate  milliGRAM(s) Oral daily  polyethylene glycol 3350 17 Gram(s) Oral two times a day  povidone iodine 10% Solution 1 Application(s) Topical daily  senna 2 Tablet(s) Oral at bedtime  sevelamer carbonate 1600 milliGRAM(s) Oral three times a day with meals  sodium chloride 0.9% lock flush 10 milliLiter(s) IV Push every 1 hour PRN      Review of Systems:  Review of systems otherwise negative except as previously noted.    Allergies: nitrofurantoin (Nephrotoxicity)    For details regarding the patient's past medical history, social history, family history, and other miscellaneous elements, please refer the initial infectious diseases consultation and/or the admitting history and physical examination for this admission.    Objective:  Vitals:   T(C): 36.4 (04-25-22 @ 05:02), Max: 37.1 (04-24-22 @ 15:20)  HR: 105 (04-25-22 @ 05:02) (76 - 105)  BP: 142/72 (04-25-22 @ 05:02) (142/72 - 161/67)  RR: 18 (04-25-22 @ 05:02) (18 - 18)  SpO2: 97% (04-25-22 @ 05:02) (93% - 97%)    Physical Examination:  General: no acute distress, on HD   HEENT: NC/AT, anicteric, neck supple  Respiratory: no acc muscle use, breathing comfortably  Cardiovascular: S1 and S2 present  Gastrointestinal: normal appearing, nondistended  Extremities: no edema, no cyanosis  Skin: no visible rash, right chest TDC      Laboratory Studies:  CBC:                       9.4    13.96 )-----------( 292      ( 25 Apr 2022 07:14 )             31.5     CMP: 04-25    137  |  98  |  69<H>  ----------------------------<  196<H>  5.0   |  19<L>  |  8.06<H>    Ca    8.9      25 Apr 2022 07:11  Phos  6.1     04-25  Mg     2.6     04-25    TPro  6.9  /  Alb  2.9<L>  /  TBili  0.4  /  DBili  x   /  AST  6<L>  /  ALT  <5<L>  /  AlkPhos  121<H>  04-25    LIVER FUNCTIONS - ( 25 Apr 2022 07:11 )  Alb: 2.9 g/dL / Pro: 6.9 g/dL / ALK PHOS: 121 U/L / ALT: <5 U/L / AST: 6 U/L / GGT: x               Microbiology: reviewed      Radiology: reviewed       [facility], Division of Infectious Diseases  ADRIANA Seals Y. Patel, S. Shah, G. [facility]  [phone]    Name: DYLAN DEL CASTILLO  Age: 67y  Gender: Male  MRN: [id number]    Interval History:  Patient seen and examined at bedside this morning  No acute overnight events. Afebrile  No complaints  Wife at bedside  Notes reviewed    Antibiotics:      Medications:  allopurinol 100 milliGRAM(s) Oral daily  apixaban 5 milliGRAM(s) Oral two times a day  aspirin enteric coated 81 milliGRAM(s) Oral daily  atorvastatin 40 milliGRAM(s) Oral at bedtime  bisacodyl 5 milliGRAM(s) Oral every 12 hours PRN  chlorhexidine 2% Cloths 1 Application(s) Topical <User Schedule>  chlorhexidine 4% Liquid 1 Application(s) Topical <User Schedule>  dextrose 50% Injectable 25 Gram(s) IV Push once  dextrose 50% Injectable 25 Gram(s) IV Push once  dextrose 50% Injectable 12.5 Gram(s) IV Push once  dextrose Oral Gel 15 Gram(s) Oral once PRN  diltiazem    milliGRAM(s) Oral daily  hydrALAZINE 25 milliGRAM(s) Oral three times a day  insulin lispro (ADMELOG) corrective regimen sliding scale   SubCutaneous three times a day before meals  insulin lispro (ADMELOG) corrective regimen sliding scale   SubCutaneous at bedtime  lidocaine   4% Patch 1 Patch Transdermal daily  metoprolol succinate  milliGRAM(s) Oral daily  polyethylene glycol 3350 17 Gram(s) Oral two times a day  povidone iodine 10% Solution 1 Application(s) Topical daily  senna 2 Tablet(s) Oral at bedtime  sevelamer carbonate 1600 milliGRAM(s) Oral three times a day with meals  sodium chloride 0.9% lock flush 10 milliLiter(s) IV Push every 1 hour PRN      Review of Systems:  Review of systems otherwise negative except as previously noted.    Allergies: nitrofurantoin (Nephrotoxicity)    For details regarding the patient's past medical history, social history, family history, and other miscellaneous elements, please refer the initial infectious diseases consultation and/or the admitting history and physical examination for this admission.    Objective:  Vitals:   T(C): 36.4 (04-25-22 @ 05:02), Max: 37.1 (04-24-22 @ 15:20)  HR: 105 (04-25-22 @ 05:02) (76 - 105)  BP: 142/72 (04-25-22 @ 05:02) (142/72 - 161/67)  RR: 18 (04-25-22 @ 05:02) (18 - 18)  SpO2: 97% (04-25-22 @ 05:02) (93% - 97%)    Physical Examination:  General: no acute distress  HEENT: NC/AT, anicteric, neck supple  Respiratory: no acc muscle use, breathing comfortably  Cardiovascular: S1 and S2 present  Gastrointestinal: normal appearing, nondistended  Extremities: no edema, no cyanosis, chronic venous stasis changes  Skin: no visible rash, right chest TDC      Laboratory Studies:  CBC:                       9.4    13.96 )-----------( 292      ( 25 Apr 2022 07:14 )             31.5     CMP: 04-25    137  |  98  |  69<H>  ----------------------------<  196<H>  5.0   |  19<L>  |  8.06<H>    Ca    8.9      25 Apr 2022 07:11  Phos  6.1     04-25  Mg     2.6     04-25    TPro  6.9  /  Alb  2.9<L>  /  TBili  0.4  /  DBili  x   /  AST  6<L>  /  ALT  <5<L>  /  AlkPhos  121<H>  04-25    LIVER FUNCTIONS - ( 25 Apr 2022 07:11 )  Alb: 2.9 g/dL / Pro: 6.9 g/dL / ALK PHOS: 121 U/L / ALT: <5 U/L / AST: 6 U/L / GGT: x               Microbiology: reviewed      Radiology: reviewed

## 2022-04-25 NOTE — PROGRESS NOTE ADULT - ASSESSMENT
67 year old gentleman with PMH of CAD, NSTEMI s/p 3 stents in 2014 (stents to LAD, proximal and distal LCx), ischemic cardiomyopathy, HTN for 10 years, T2DM for 26 years c/b peripheral neuropathy, CVA, TIA, Afib on Pradaxa, chronic RLE wound, L carotid stenosis s/p endarterectomy (2014) just recently admitted  to the [facility] on 2/19/2022 with acute onset chest pain for one day found to have an NSTEMI, CAD, s/p PCI in setting of increased AF rates off bb for 3 days and resolved chest pain, his CKMB peaked and Echo noted with EF 60%,normal LV function, mild TR, mild IL. He was s/p [facility] with 85% proximal LAD s/p balloon angioplasty and LULU. He presented to [facility] ED with decreased urine output over the week. [facility] Stevens [facility] for [facility]uria and was started  on Nitrofurantoin. Pt is still taking Nitrofurantoin w/ 5 days left. He came to the ED due to worsening symptoms. Pt reports having a similar incident 4-5 years ago that resolved spontaneously. He reports increased leg edema Dyspnea worse on exertion. his baseline Serum creatinine is in the 2.0 to 2.3 mg/dl range. ANT with serum creatinine of 8.29 with bun 144 and k 5.5        1- ANT on ckd III   2- chf chronic   3- hyperphosphatemia /shpt   4- anemia   5- hyperlipidemia   6- HTN /a fib  7- TIA hx       HD am   renvela 2 tab with meals   anemia - epogen 06705 Units TIW with HD.  PT  anticoagulation / DAPT therefore unable to undergo renal bx for his ANT. he does not appear to be recovering his renal function at present   dc planning as per primary team

## 2022-04-25 NOTE — PROGRESS NOTE ADULT - SUBJECTIVE AND OBJECTIVE BOX
[facility]   [phone]  RENAL FOLLOW UP NOTE  --------------------------------------------------------------------------------  Chief Complaint:    24 hour events/subjective:    seen earlier   per wife more responsive     PAST HISTORY  --------------------------------------------------------------------------------  No significant changes to PMH, PSH, FHx, SHx, unless otherwise noted    ALLERGIES & MEDICATIONS  --------------------------------------------------------------------------------  Allergies    nitrofurantoin (Nephrotoxicity)    Intolerances      Standing Inpatient Medications  allopurinol 100 milliGRAM(s) Oral daily  apixaban 5 milliGRAM(s) Oral two times a day  aspirin enteric coated 81 milliGRAM(s) Oral daily  atorvastatin 40 milliGRAM(s) Oral at bedtime  chlorhexidine 2% Cloths 1 Application(s) Topical <User Schedule>  chlorhexidine 4% Liquid 1 Application(s) Topical <User Schedule>  dextrose 50% Injectable 25 Gram(s) IV Push once  dextrose 50% Injectable 25 Gram(s) IV Push once  dextrose 50% Injectable 12.5 Gram(s) IV Push once  diltiazem    milliGRAM(s) Oral daily  hydrALAZINE 25 milliGRAM(s) Oral three times a day  insulin lispro (ADMELOG) corrective regimen sliding scale   SubCutaneous three times a day before meals  insulin lispro (ADMELOG) corrective regimen sliding scale   SubCutaneous at bedtime  lidocaine   4% Patch 1 Patch Transdermal daily  metoprolol succinate  milliGRAM(s) Oral daily  polyethylene glycol 3350 17 Gram(s) Oral two times a day  povidone iodine 10% Solution 1 Application(s) Topical daily  senna 2 Tablet(s) Oral at bedtime  sevelamer carbonate 1600 milliGRAM(s) Oral three times a day with meals    PRN Inpatient Medications  bisacodyl 5 milliGRAM(s) Oral every 12 hours PRN  dextrose Oral Gel 15 Gram(s) Oral once PRN  sodium chloride 0.9% lock flush 10 milliLiter(s) IV Push every 1 hour PRN      REVIEW OF SYSTEMS  --------------------------------------------------------------------------------    Gen: denies fevers/chills,  CVS: denies chest pain/palpitations  Resp: denies SOB/Cough  GI: Denies N/V/Abd pain  : Denies dysuria  still with decrease urination       VITALS/PHYSICAL EXAM  --------------------------------------------------------------------------------  T(C): 36.5 (04-25-22 @ 21:03), Max: 36.5 (04-25-22 @ 21:03)  HR: 98 (04-25-22 @ 21:03) (89 - 105)  BP: 122/61 (04-25-22 @ 21:03) (122/61 - 150/79)  RR: 18 (04-25-22 @ 21:03) (18 - 18)  SpO2: 99% (04-25-22 @ 21:03) (97% - 99%)  Wt(kg): --        04-24-22 @ 07:01  -  04-25-22 @ 07:00  --------------------------------------------------------  IN: 118 mL / OUT: 0 mL / NET: 118 mL    04-25-22 @ 07:01  -  04-25-22 @ 21:45  --------------------------------------------------------  IN: 300 mL / OUT: 0 mL / NET: 300 mL      Physical Exam:  	          Gen: Appears comfortable, but overall more responsive   	Pulm: Decreased breath sounds b/l bases.  	CV: No JVD. IRR, tachy  	Abd: +BS, soft, nontender, softly distended, obese               Extremity UE: increased warmth, R hand, increased warmth, swollen              Extremity LE: + hyperpigmented bilateral LE with B/L  no edema              Vascular: R IJ HD catheter, L arm AVF       LABS/STUDIES  --------------------------------------------------------------------------------              9.4    13.96 >-----------<  292      [04-25-22 @ 07:14]              31.5     137  |  98  |  69  ----------------------------<  196      [04-25-22 @ 07:11]  5.0   |  19  |  8.06        Ca     8.9     [04-25-22 @ 07:11]      Mg     2.6     [04-25-22 @ 07:11]      Phos  6.1     [04-25-22 @ 07:11]    TPro  6.9  /  Alb  2.9  /  TBili  0.4  /  DBili  x   /  AST  6   /  ALT  <5  /  AlkPhos  121  [04-25-22 @ 07:11]          Creatinine Trend:  SCr 8.06 [04-25 @ 07:11]  SCr 5.91 [04-24 @ 07:13]  SCr 7.94 [04-23 @ 07:09]  SCr 5.93 [04-22 @ 07:23]  SCr 7.42 [04-21 @ 06:50]              Urinalysis - [04-03-22 @ 00:48]      Color Light Orange / Appearance Turbid / SG 1.021 / pH 5.5      Gluc Negative / Ketone Negative  / Bili Negative / Urobili Negative       Blood Large / Protein 300 mg/dL / Leuk Est Large / Nitrite Negative      RBC 20 /  / Hyaline 10 / Gran  / Sq Epi  / Non Sq Epi 3 / Bacteria Negative      Iron 16, TIBC 239, %sat 7      [04-15-22 @ 10:16]  Ferritin 172      [04-15-22 @ 09:05]  PTH -- (Ca 8.3)      [04-15-22 @ 12:18]   150  PTH -- (Ca --)      [04-03-22 @ 23:06]   97  HbA1c 11.7      [11-07-19 @ 09:14]  TSH 1.98      [04-05-22 @ 05:19]    DEREJE: titer Negative, pattern Negative      [04-11-22 @ 16:24]  C3 Complement 61      [04-11-22 @ 16:56]  C4 Complement 28      [04-11-22 @ 16:56]  ANCA: cANCA Negative, pANCA Negative, atypical ANCA Indeterminate Method interference due to DEREJE fluorescence      [04-11-22 @ 16:24]  anti-GBM 3      [04-11-22 @ 19:27]  Free Light Chains: kappa 16.92, lambda 12.63, ratio = 1.34      [04-11 @ 16:56]  Immunofixation Serum: No Monoclonal Band Identified    Reference Range: None Detected      [04-11-22 @ 16:56]  SPEP Interpretation: Normal Electrophoresis Pattern      [04-11-22 @ 16:56]

## 2022-04-25 NOTE — PROGRESS NOTE ADULT - SUBJECTIVE AND OBJECTIVE BOX
DATE OF SERVICE: 04-25-22 @ 12:59  CHIEF COMPLAINT:Patient is a 67y old  Male who presents with a chief complaint of Decreased urine output for the past week, leg oedema (25 Apr 2022 12:16)    	        PAST MEDICAL & SURGICAL HISTORY:  HTN (hypertension), benign    HLD (hyperlipidemia)    DM type 2 (diabetes mellitus, type 2)    TIA (transient ischemic attack)    Atrial fibrillation    MI (myocardial infarction)  circa 2014 and 2022.    CAD (coronary artery disease)    Neuropathy    Stage 3 chronic kidney disease    2019 novel coronavirus disease (COVID-19)  12/2021.  Received the COVID-19 vaccine x 2 as of April 2022.    Status post angioplasty with stent  LULU x 3 2/7/2014    S/P carotid endarterectomy  left            REVIEW OF SYSTEMS:  more awake   alert    RESPIRATORY: No cough, wheezing, chills or hemoptysis; No Shortness of Breath  CARDIOVASCULAR: No chest pain, palpitations, passing out,  GASTROINTESTINAL: No abdominal or epigastric pain. No nausea, vomiting,  GENITOURINARY: No dysuria, frequency, hematuria,   NEUROLOGICAL: No headaches,   pain better    Medications:  MEDICATIONS  (STANDING):  allopurinol 100 milliGRAM(s) Oral daily  apixaban 5 milliGRAM(s) Oral two times a day  aspirin enteric coated 81 milliGRAM(s) Oral daily  atorvastatin 40 milliGRAM(s) Oral at bedtime  chlorhexidine 2% Cloths 1 Application(s) Topical <User Schedule>  chlorhexidine 4% Liquid 1 Application(s) Topical <User Schedule>  dextrose 50% Injectable 25 Gram(s) IV Push once  dextrose 50% Injectable 25 Gram(s) IV Push once  dextrose 50% Injectable 12.5 Gram(s) IV Push once  diltiazem    milliGRAM(s) Oral daily  hydrALAZINE 25 milliGRAM(s) Oral three times a day  insulin lispro (ADMELOG) corrective regimen sliding scale   SubCutaneous three times a day before meals  insulin lispro (ADMELOG) corrective regimen sliding scale   SubCutaneous at bedtime  lidocaine   4% Patch 1 Patch Transdermal daily  metoprolol succinate  milliGRAM(s) Oral daily  polyethylene glycol 3350 17 Gram(s) Oral two times a day  povidone iodine 10% Solution 1 Application(s) Topical daily  senna 2 Tablet(s) Oral at bedtime  sevelamer carbonate 1600 milliGRAM(s) Oral three times a day with meals    MEDICATIONS  (PRN):  bisacodyl 5 milliGRAM(s) Oral every 12 hours PRN Constipation  dextrose Oral Gel 15 Gram(s) Oral once PRN Blood Glucose LESS THAN 70 milliGRAM(s)/deciliter  sodium chloride 0.9% lock flush 10 milliLiter(s) IV Push every 1 hour PRN Pre/post blood products, medications, blood draw, and to maintain line patency    	    PHYSICAL EXAM:  T(C): 36.4 (04-25-22 @ 05:02), Max: 37.1 (04-24-22 @ 15:20)  HR: 105 (04-25-22 @ 05:02) (76 - 105)  BP: 142/72 (04-25-22 @ 05:02) (142/72 - 161/67)  RR: 18 (04-25-22 @ 05:02) (18 - 18)  SpO2: 97% (04-25-22 @ 05:02) (93% - 97%)  Wt(kg): --  I&O's Summary    24 Apr 2022 07:01  -  25 Apr 2022 07:00  --------------------------------------------------------  IN: 118 mL / OUT: 0 mL / NET: 118 mL        Appearance: Normal	  HEENT:   Normal oral mucosa, PERRL, EOMI	  Lymphatic: No lymphadenopathy  Cardiovascular: reg irreg  Respiratory: Lungs clear to auscultation	  Psychiatry: A & O x 3  Gastrointestinal:  Soft, Non-tender, + BS	  Skin: No rashes, No ecchymoses, No cyanosis	  Neurologic: Non-focal  Extremities:  pvd  dec rom  luext avf     TELEMETRY: 	    ECG:  	  RADIOLOGY:  OTHER: 	  	  LABS:	 	    CARDIAC MARKERS:                                9.4    13.96 )-----------( 292      ( 25 Apr 2022 07:14 )             31.5     04-25    137  |  98  |  69<H>  ----------------------------<  196<H>  5.0   |  19<L>  |  8.06<H>    Ca    8.9      25 Apr 2022 07:11  Phos  6.1     04-25  Mg     2.6     04-25    TPro  6.9  /  Alb  2.9<L>  /  TBili  0.4  /  DBili  x   /  AST  6<L>  /  ALT  <5<L>  /  AlkPhos  121<H>  04-25    proBNP:   Lipid Profile:   HgA1c:   TSH:

## 2022-04-25 NOTE — CHART NOTE - NSCHARTNOTEFT_GEN_A_CORE
Nutrition Follow Up Note  Patient seen for:    Chart reviewed, events noted  Patient is a 67y old  Male who presents with a chief complaint of Decreased urine output for the past week, leg oedema.   Acute kidney injury superimposed on CKD. Patient continued on HD        Source: [ ] Patient       [x] EMR        [ ] RN        [X ] Family at bedside  pt wife    -If unable to interview patient: [X ]   Diet Order:   Diet, Renal Restrictions:   For patients receiving Renal Replacement - No Protein Restr, No Conc K, No Conc Phos, Low Sodium  Consistent Carbohydrate {No Snacks} (CSTCHO)  DASH/TLC {Sodium & Cholesterol Restricted} (DASH)  1500mL Fluid Restriction ([id number]) (22)  Diet, NPO after Midnight:      NPO Start Date: 2022,   NPO Start Time: 23:59 (22)    - Is current order appropriate/adequate? [X ] Yes  [ ]  No:     - PO intake :      [X ] 50-75%  Fair      per patient wife he eats well at breakfast, 50% lunch and dinner  - Nutrition-Related Concerns: inadequate protein intake      - Micronutrient Supplementation: sodium chloride 0.9% lock flush 10 milliLiter(s) IV Push every 1 hour PRN    GI:  Last BM ___.   Bowel Regimen? [ ] Yes   [ ] No    Weights:   Daily Weight in k.1 (), Weight in k.5 (), Weight in k ()  desireable weight loss noted, fluid loss    Pertinent Labs:  @ 07:11: Na 137, BUN 69<H>, Cr 8.06<H>, <H>, K+ 5.0, Phos 6.1<H>, Mg 2.6, Alk Phos 121<H>, ALT/SGPT <5<L>, AST/SGOT 6<L>, HbA1c --    A1C with Estimated Average Glucose Result: 10.1 % (22 @ 12:18)    Finger Sticks:  POCT Blood Glucose.: 276 mg/dL ( @ 12:01)  POCT Blood Glucose.: 232 mg/dL ( @ 08:02)  POCT Blood Glucose.: 203 mg/dL ( @ 21:55)  POCT Blood Glucose.: 162 mg/dL ( @ 16:52)      Skin per nursing documentation: no pressure ulcers  Edema: noted reduced generalized 1+    Estimated Needs:   [ X] no change since previous assessment  [ ] recalculated:     Previous Nutrition Diagnosis: Inadequate oral intake  Nutrition Diagnosis is: [X ] ongoing     New Nutrition Diagnosis: Increased Nutrient needs  Etiology: related to Increased nutrient losses via dialysate  Signs: continue HD with <75% PO intake    Nutrition Care Plan:  [X] In Progress  [ ] Achieved  [ ] Not applicable    Nutrition Interventions / Recommendations:    1) Education Provided:     [X ] Yes:   pt wife present , need for increase protein      2) continue current diet                                                                                              3) Oral Nutrition Supplementation: add Nepro x2    4) continue feeding assist, tray prep  Discussed with team   Monitoring and Evaluation:   Continue to monitor Nutritional intake, Tolerance to diet prescription, weights, labs, skin integrity    RD remains available upon request and will follow up per protocol

## 2022-04-26 NOTE — PROGRESS NOTE ADULT - ASSESSMENT
67 year old gentleman with PMH of CAD, NSTEMI s/p 3 stents in 2014 (stents to LAD, proximal and distal LCx), ischemic cardiomyopathy, HTN for 10 years, T2DM for 26 years c/b peripheral neuropathy, CVA, TIA, Afib on Pradaxa, chronic RLE wound, L carotid stenosis s/p endarterectomy (2014) just recently admitted  to the [facility] on 2/19/2022 with acute onset chest pain for one day found to have an NSTEMI, CAD, s/p PCI in setting of increased AF rates off bb for 3 days and resolved chest pain, his CKMB peaked and Echo noted with EF 60%,normal LV function, mild TR, mild NM. He was s/p [facility] with 85% proximal LAD s/p balloon angioplasty and LULU. He presented to [facility] ED with decreased urine output over the week. [facility] to [facility] for [facility]uria and was started  on Nitrofurantoin. Pt is still taking Nitrofurantoin w/ 5 days left. He came to the ED due to worsening symptoms. Pt reports having a similar incident 4-5 years ago that resolved spontaneously. He reports increased leg edema Dyspnea worse on exertion. his baseline Serum creatinine is in the 2.0 to 2.3 mg/dl range. ANT with serum creatinine of 8.29 with bun 144 and k 5.5        1- ANT on ckd III   2- chf chronic   3- hyperphosphatemia /shpt   4- anemia   5- hyperlipidemia   6- HTN /a fib  7- TIA hx       HD am   F200, 180 min, , , 2L fluid removal  see HD flowsheet  renvela 2 tab with meals   anemia - epogen 73866 Units TIW with HD.  PT  resume lyrica  consider pain management consult  dc planning as per primary team

## 2022-04-26 NOTE — PROGRESS NOTE ADULT - SUBJECTIVE AND OBJECTIVE BOX
[facility]   [phone]  RENAL FOLLOW UP NOTE  --------------------------------------------------------------------------------  Chief Complaint:    24 hour events/subjective:    Patient seen and examined with Dr. Art duncan cp  c/o neuropathy pain    PAST HISTORY  --------------------------------------------------------------------------------  No significant changes to PMH, PSH, FHx, SHx, unless otherwise noted    ALLERGIES & MEDICATIONS  --------------------------------------------------------------------------------  Allergies    nitrofurantoin (Nephrotoxicity)    Intolerances      Standing Inpatient Medications  allopurinol 100 milliGRAM(s) Oral daily  apixaban 5 milliGRAM(s) Oral two times a day  aspirin enteric coated 81 milliGRAM(s) Oral daily  atorvastatin 40 milliGRAM(s) Oral at bedtime  chlorhexidine 2% Cloths 1 Application(s) Topical <User Schedule>  chlorhexidine 4% Liquid 1 Application(s) Topical <User Schedule>  dextrose 50% Injectable 25 Gram(s) IV Push once  dextrose 50% Injectable 25 Gram(s) IV Push once  dextrose 50% Injectable 12.5 Gram(s) IV Push once  diltiazem    milliGRAM(s) Oral daily  hydrALAZINE 25 milliGRAM(s) Oral three times a day  insulin lispro (ADMELOG) corrective regimen sliding scale   SubCutaneous at bedtime  insulin lispro (ADMELOG) corrective regimen sliding scale   SubCutaneous three times a day before meals  lidocaine   4% Patch 1 Patch Transdermal daily  metoprolol succinate  milliGRAM(s) Oral daily  polyethylene glycol 3350 17 Gram(s) Oral two times a day  povidone iodine 10% Solution 1 Application(s) Topical daily  senna 2 Tablet(s) Oral at bedtime  sevelamer carbonate 1600 milliGRAM(s) Oral three times a day with meals    PRN Inpatient Medications  bisacodyl 5 milliGRAM(s) Oral every 12 hours PRN  dextrose Oral Gel 15 Gram(s) Oral once PRN  oxycodone    5 mG/acetaminophen 325 mG 1 Tablet(s) Oral every 12 hours PRN  sodium chloride 0.9% lock flush 10 milliLiter(s) IV Push every 1 hour PRN      REVIEW OF SYSTEMS  --------------------------------------------------------------------------------    Gen: denies fevers/chills,  CVS: denies chest pain/palpitations  Resp: denies SOB/Cough  GI: Denies N/V/Abd pain  : Denies dysuria    VITALS/PHYSICAL EXAM  --------------------------------------------------------------------------------  T(C): 36.6 (04-26-22 @ 12:25), Max: 36.6 (04-26-22 @ 12:25)  HR: 74 (04-26-22 @ 12:25) (74 - 103)  BP: 124/68 (04-26-22 @ 12:25) (122/61 - 148/-)  RR: 18 (04-26-22 @ 12:25) (18 - 18)  SpO2: 96% (04-26-22 @ 12:25) (96% - 99%)  Wt(kg): --        04-25-22 @ 07:01  -  04-26-22 @ 07:00  --------------------------------------------------------  IN: 300 mL / OUT: 0 mL / NET: 300 mL      Physical Exam:  	              Gen: Appears comfortable, but overall more responsive   	Pulm: Decreased breath sounds b/l bases.  	CV: No JVD. IRR, tachy  	Abd: +BS, soft, nontender, softly distended, obese               Extremity UE: increased warmth, R hand, increased warmth, swollen              Extremity LE: + hyperpigmented bilateral LE with B/L  no edema              Vascular: R IJ HD catheter, L arm AVF     LABS/STUDIES  --------------------------------------------------------------------------------              9.2    10.46 >-----------<  283      [04-26-22 @ 05:54]              31.6     132  |  93  |  80  ----------------------------<  172      [04-26-22 @ 05:49]  4.8   |  19  |  9.03        Ca     8.9     [04-26-22 @ 05:49]      Mg     2.8     [04-26-22 @ 05:49]      Phos  6.7     [04-26-22 @ 05:49]    TPro  6.9  /  Alb  2.9  /  TBili  0.4  /  DBili  x   /  AST  6   /  ALT  <5  /  AlkPhos  121  [04-25-22 @ 07:11]          Creatinine Trend:  SCr 9.03 [04-26 @ 05:49]  SCr 8.06 [04-25 @ 07:11]  SCr 5.91 [04-24 @ 07:13]  SCr 7.94 [04-23 @ 07:09]  SCr 5.93 [04-22 @ 07:23]              Urinalysis - [04-03-22 @ 00:48]      Color Light Orange / Appearance Turbid / SG 1.021 / pH 5.5      Gluc Negative / Ketone Negative  / Bili Negative / Urobili Negative       Blood Large / Protein 300 mg/dL / Leuk Est Large / Nitrite Negative      RBC 20 /  / Hyaline 10 / Gran  / Sq Epi  / Non Sq Epi 3 / Bacteria Negative      Iron 16, TIBC 239, %sat 7      [04-15-22 @ 10:16]  Ferritin 172      [04-15-22 @ 09:05]  PTH -- (Ca 8.3)      [04-15-22 @ 12:18]   150  PTH -- (Ca --)      [04-03-22 @ 23:06]   97  HbA1c 11.7      [11-07-19 @ 09:14]  TSH 1.98      [04-05-22 @ 05:19]    DEREJE: titer Negative, pattern Negative      [04-11-22 @ 16:24]  C3 Complement 61      [04-11-22 @ 16:56]  C4 Complement 28      [04-11-22 @ 16:56]  ANCA: cANCA Negative, pANCA Negative, atypical ANCA Indeterminate Method interference due to DEREJE fluorescence      [04-11-22 @ 16:24]  anti-GBM 3      [04-11-22 @ 19:27]  Free Light Chains: kappa 16.92, lambda 12.63, ratio = 1.34      [04-11 @ 16:56]  Immunofixation Serum: No Monoclonal Band Identified    Reference Range: None Detected      [04-11-22 @ 16:56]  SPEP Interpretation: Normal Electrophoresis Pattern      [04-11-22 @ 16:56]

## 2022-04-26 NOTE — PROGRESS NOTE ADULT - ASSESSMENT
Patient is a 67 year old male with PMH of CAD with past multiple PCI, with most recent discharge from [place] in Feb 2022 for NSTEMI with LULU of an 85% prox LAD lesion with patient on ASA and now off Plavix per cardiology (only for one month), chronic afib on Pradaxa, HTN, type 2 DM on insulin, diabetic neuropathy with chronic RIGHT LE venous stasis changes>left, LEFT foot ulcer seen by podiatry, past endarterectomy LEFT CEA in 2014 who presented with UTI with hematuria diagnosed at urgent care and now with decreased urine output.   Plan to take patient to OR Monday 4/18 for LUE AVF creation, possible graft placement.    Acute cystitis  Leukocytosis  Noted dark urine with hematuria, went to UC and was prescribed macrobid, had some improvement in urine color but then noticed decreased urine output with no voiding reported in last 2 days  UA here with pyuria -  with large LE, negative nitrites and no bacteria. UCx/BCx NGTD  Imaging: CTAP reviewed - no hydronephrosis, nephrolithiasis, or evidence of obstructive uropathy  S/p ceftriaxone x7 day course completed 4/9  C/w monitoring off Abx.   Trend temps/WBC--Leukocytosis resolved    ANT on CKD3   - Nephrology following, appreciate recs   - s/p placement of shiley and initiation of HD   - pending permcath placement   - monitor Cr    - renally dose meds    - avoid nephrotoxic agents    B/l LE edema with chronic venous stasis  Acute on chronic HFpEF   - legs cool to touch, nontender, chronic changes with no sign of infection/cellulitis   - has wound on bottom of L foot - dry and does not appear infected either   - clinically overloaded, Cardiology and Nephrology following   - elevate lower extremities      DM2 with neuropathy   - Blood glucose management per primary team.    Infectious Diseases will continue to follow. Please call with any questions.   Annie King M.D.  [facility], Division of Infectious Diseases [phone]     Patient is a 67 year old male with PMH of CAD with past multiple PCI, with most recent discharge from [place] in Feb 2022 for NSTEMI with LULU of an 85% prox LAD lesion with patient on ASA and now off Plavix per cardiology (only for one month), chronic afib on Pradaxa, HTN, type 2 DM on insulin, diabetic neuropathy with chronic RIGHT LE venous stasis changes>left, LEFT foot ulcer seen by podiatry, past endarterectomy LEFT CEA in 2014 who presented with UTI with hematuria diagnosed at urgent care and now with decreased urine output.   Plan to take patient to OR Monday 4/18 for LUE AVF creation, possible graft placement.    Acute cystitis  Leukocytosis  Noted dark urine with hematuria, went to UC and was prescribed macrobid, had some improvement in urine color but then noticed decreased urine output with no voiding reported in last 2 days  UA here with pyuria -  with large LE, negative nitrites and no bacteria. UCx/BCx NGTD  Imaging: CTAP reviewed - no hydronephrosis, nephrolithiasis, or evidence of obstructive uropathy  S/p ceftriaxone x7 day course completed 4/9  C/w monitoring off Abx.   Trend temps/WBC--Leukocytosis resolved    ANT on CKD3   - Nephrology following, appreciate recs   - s/p placement of shiley and initiation of HD   - permcath placement 4/20   - monitor Cr    - renally dose meds    - avoid nephrotoxic agents    B/l LE edema with chronic venous stasis  Acute on chronic HFpEF   - legs cool to touch, nontender, chronic changes with no sign of infection/cellulitis   - has wound on bottom of L foot - dry and does not appear infected either   - clinically overloaded, Cardiology and Nephrology following   - elevate lower extremities      DM2 with neuropathy   - Blood glucose management per primary team.    Infectious Diseases will continue to follow. Please call with any questions.   Annie King M.D.  [facility], Division of Infectious Diseases [phone]

## 2022-04-26 NOTE — PROVIDER CONTACT NOTE (MEDICATION) - SITUATION
Patient scheduled for Dialysis today. Rescheduling meds to noon with a comment * to give post dialysis.  Medications include Cardizem, hydralazine and metoprolol. Patient scheduled for Dialysis today. Rescheduling meds to day shift with a comment * to give post dialysis.  Medications include Cardizem, hydralazine and metoprolol.

## 2022-04-26 NOTE — PROGRESS NOTE ADULT - SUBJECTIVE AND OBJECTIVE BOX
Follow-up Pulm Progress Note    No new respiratory events overnight.  Denies SOB/CP.   Sats 97% RA    Medications:  MEDICATIONS  (STANDING):  allopurinol 100 milliGRAM(s) Oral daily  apixaban 5 milliGRAM(s) Oral two times a day  aspirin enteric coated 81 milliGRAM(s) Oral daily  atorvastatin 40 milliGRAM(s) Oral at bedtime  chlorhexidine 2% Cloths 1 Application(s) Topical <User Schedule>  chlorhexidine 4% Liquid 1 Application(s) Topical <User Schedule>  dextrose 50% Injectable 25 Gram(s) IV Push once  dextrose 50% Injectable 25 Gram(s) IV Push once  dextrose 50% Injectable 12.5 Gram(s) IV Push once  diltiazem    milliGRAM(s) Oral daily  epoetin naz-epbx (RETACRIT) Injectable 89759 Unit(s) IV Push once  hydrALAZINE 25 milliGRAM(s) Oral three times a day  insulin lispro (ADMELOG) corrective regimen sliding scale   SubCutaneous at bedtime  insulin lispro (ADMELOG) corrective regimen sliding scale   SubCutaneous three times a day before meals  lidocaine   4% Patch 1 Patch Transdermal daily  metoprolol succinate  milliGRAM(s) Oral daily  polyethylene glycol 3350 17 Gram(s) Oral two times a day  povidone iodine 10% Solution 1 Application(s) Topical daily  senna 2 Tablet(s) Oral at bedtime  sevelamer carbonate 1600 milliGRAM(s) Oral three times a day with meals    MEDICATIONS  (PRN):  bisacodyl 5 milliGRAM(s) Oral every 12 hours PRN Constipation  dextrose Oral Gel 15 Gram(s) Oral once PRN Blood Glucose LESS THAN 70 milliGRAM(s)/deciliter  oxycodone    5 mG/acetaminophen 325 mG 1 Tablet(s) Oral every 12 hours PRN Moderate Pain (4 - 6)  sodium chloride 0.9% lock flush 10 milliLiter(s) IV Push every 1 hour PRN Pre/post blood products, medications, blood draw, and to maintain line patency          Vital Signs Last 24 Hrs  T(C): 36.4 (26 Apr 2022 11:24), Max: 36.5 (25 Apr 2022 21:03)  T(F): 97.6 (26 Apr 2022 11:24), Max: 97.7 (25 Apr 2022 21:03)  HR: 88 (26 Apr 2022 11:24) (88 - 103)  BP: 137/70 (26 Apr 2022 11:24) (122/61 - 150/79)  BP(mean): --  RR: 18 (26 Apr 2022 11:24) (18 - 18)  SpO2: 97% (26 Apr 2022 11:24) (97% - 99%)          04-25 @ 07:01  -  04-26 @ 07:00  --------------------------------------------------------  IN: 300 mL / OUT: 0 mL / NET: 300 mL          LABS:                        9.2    10.46 )-----------( 283      ( 26 Apr 2022 05:54 )             31.6     04-26    132<L>  |  93<L>  |  80<H>  ----------------------------<  172<H>  4.8   |  19<L>  |  9.03<H>    Ca    8.9      26 Apr 2022 05:49  Phos  6.7     04-26  Mg     2.8     04-26    TPro  6.9  /  Alb  2.9<L>  /  TBili  0.4  /  DBili  x   /  AST  6<L>  /  ALT  <5<L>  /  AlkPhos  121<H>  04-25          CAPILLARY BLOOD GLUCOSE      POCT Blood Glucose.: 165 mg/dL (26 Apr 2022 08:05)            DEREJE Negative 04-11 @ 16:24  Anti SS-1 --  Anti SS-2 --  Anti RNP --  RF -- 04-11 @ 16:24    Atypical ANCA Indeterminate Method interference due to DEREJE fluorescence 04-11 @ 16:24  c-ANCA titer -- 04-11 @ 16:24  c-ANCA Negative 04-11 @ 16:24  p-ANCA Negative 04-11 @ 16:24            Physical Examination:  PULM: Clear to auscultation bilaterally, no significant sputum production  CVS: S1, S2 heard      RADIOLOGY REVIEWED  CXR 4/11: unchanged b/l effusions, atelectasis    CT chest: < from: CT Chest No Cont (04.04.22 @ 16:52) >  FINDINGS:    AIRWAYS, LUNGS, PLEURA: Tracheal secretions. Small left greater than   right pleural effusions increased compared to 2/19/2022. Right-sided   pleural thickening. Lingular and left greater than right basilar   opacification, likely atelectasis.    MEDIASTINUM: Cardiomegaly. No pericardial effusion. Thoracic aorta normal   caliber.  No large mediastinal lymph nodes.    IMAGED ABDOMEN: Nonspecific perinephric stranding.    SOFT TISSUES: Unremarkable.    BONES: Unremarkable.      IMPRESSION:.    Small left greater than right bilateral pleural effusions increased in   size compared to 2/19/2022.    Lingular and left greater than right basilar opacification, likely   atelectasis.    --- End of Report ---    < end of copied text >

## 2022-04-26 NOTE — PROGRESS NOTE ADULT - SUBJECTIVE AND OBJECTIVE BOX
CARDIOLOGY FOLLOW UP - Dr. Mazariegos  DATE OF SERVICE: 4/26/22     CC no acute events       REVIEW OF SYSTEMS:  CONSTITUTIONAL: No fever, weight loss, or fatigue  RESPIRATORY: No cough, wheezing, chills or hemoptysis; No Shortness of Breath  CARDIOVASCULAR: No chest pain, palpitations, passing out, dizziness, or leg swelling  GASTROINTESTINAL: No abdominal or epigastric pain. No nausea, vomiting, or hematemesis; No diarrhea or constipation. No melena or hematochezia.  VASCULAR: No edema     PHYSICAL EXAM:  T(C): 36.4 (04-26-22 @ 04:29), Max: 36.5 (04-25-22 @ 21:03)  HR: 90 (04-26-22 @ 08:53) (89 - 103)  BP: 148/- (04-26-22 @ 08:53) (122/61 - 150/79)  RR: 18 (04-26-22 @ 08:53) (18 - 18)  SpO2: 98% (04-26-22 @ 08:53) (97% - 99%)  Wt(kg): --  I&O's Summary    25 Apr 2022 07:01  -  26 Apr 2022 07:00  --------------------------------------------------------  IN: 300 mL / OUT: 0 mL / NET: 300 mL        Appearance: Normal	  Cardiovascular: Normal S1 S2, irreg  Respiratory: Lungs clear to auscultation	  Gastrointestinal:  Soft, Non-tender, + BS	  Extremities: Normal range of motion, No clubbing, cyanosis or edema      Home Medications:  allopurinol 100 mg oral tablet: 1 tab(s) orally once a day (03 Apr 2022 06:34)  aspirin 81 mg oral delayed release tablet: 1 tab(s) orally once a day (03 Apr 2022 06:34)  bumetanide 2 mg oral tablet: 1 tab(s) orally once a day (03 Apr 2022 06:34)  insulin glargine 100 units/mL subcutaneous solution: 25 unit(s) subcutaneous once a day (at bedtime) (03 Apr 2022 06:34)  metOLazone 5 mg oral tablet: 1 tab(s) orally 3 times a week (03 Apr 2022 06:34)  metoprolol succinate 50 mg oral tablet, extended release: 2 tab(s) orally once a day (03 Apr 2022 06:34)  NovoLOG 100 units/mL subcutaneous solution: subcutaneous 4 times a day (before meals and at bedtime) (03 Apr 2022 06:34)  NovoLOG FlexPen 100 units/mL injectable solution: 10 unit(s) subcutaneous 3 times a day (03 Apr 2022 06:34)  oxycodone-acetaminophen 5 mg-325 mg oral tablet: 1 tab(s) orally 2 times a day (03 Apr 2022 06:34)  Pradaxa 150 mg oral capsule: 1 cap(s) orally 2 times a day (03 Apr 2022 06:34)  pregabalin 100 mg oral capsule: 1 cap(s) orally 3 times a day (03 Apr 2022 06:34)      MEDICATIONS  (STANDING):  allopurinol 100 milliGRAM(s) Oral daily  apixaban 5 milliGRAM(s) Oral two times a day  aspirin enteric coated 81 milliGRAM(s) Oral daily  atorvastatin 40 milliGRAM(s) Oral at bedtime  chlorhexidine 2% Cloths 1 Application(s) Topical <User Schedule>  chlorhexidine 4% Liquid 1 Application(s) Topical <User Schedule>  dextrose 50% Injectable 25 Gram(s) IV Push once  dextrose 50% Injectable 25 Gram(s) IV Push once  dextrose 50% Injectable 12.5 Gram(s) IV Push once  diltiazem    milliGRAM(s) Oral daily  epoetin naz-epbx (RETACRIT) Injectable 22465 Unit(s) IV Push once  hydrALAZINE 25 milliGRAM(s) Oral three times a day  insulin lispro (ADMELOG) corrective regimen sliding scale   SubCutaneous at bedtime  insulin lispro (ADMELOG) corrective regimen sliding scale   SubCutaneous three times a day before meals  lidocaine   4% Patch 1 Patch Transdermal daily  metoprolol succinate  milliGRAM(s) Oral daily  polyethylene glycol 3350 17 Gram(s) Oral two times a day  povidone iodine 10% Solution 1 Application(s) Topical daily  senna 2 Tablet(s) Oral at bedtime  sevelamer carbonate 1600 milliGRAM(s) Oral three times a day with meals      TELEMETRY: 	afib hr 70s     ECG:  	  RADIOLOGY:   DIAGNOSTIC TESTING:  [ ] Echocardiogram:  [ ]  Catheterization:  [ ] Stress Test:    OTHER: 	    LABS:	 	                            9.2    10.46 )-----------( 283      ( 26 Apr 2022 05:54 )             31.6     04-26    132<L>  |  93<L>  |  80<H>  ----------------------------<  172<H>  4.8   |  19<L>  |  9.03<H>    Ca    8.9      26 Apr 2022 05:49  Phos  6.7     04-26  Mg     2.8     04-26    TPro  6.9  /  Alb  2.9<L>  /  TBili  0.4  /  DBili  x   /  AST  6<L>  /  ALT  <5<L>  /  AlkPhos  121<H>  04-25

## 2022-04-26 NOTE — PROGRESS NOTE ADULT - SUBJECTIVE AND OBJECTIVE BOX
[facility], Division of Infectious Diseases  ADRIANA Seals Y. Patel, S. Shah, G. [facility]  [phone]    Name: DYLAN DEL CASTILLO  Age: 67y  Gender: Male  MRN: [id number]    Interval History:  Patient seen and examined at bedside this morning  No acute overnight events. Afebrile  Son at bedside  Pt in chair configuration   Notes reviewed    Antibiotics:      Medications:  allopurinol 100 milliGRAM(s) Oral daily  apixaban 5 milliGRAM(s) Oral two times a day  aspirin enteric coated 81 milliGRAM(s) Oral daily  atorvastatin 40 milliGRAM(s) Oral at bedtime  bisacodyl 5 milliGRAM(s) Oral every 12 hours PRN  chlorhexidine 2% Cloths 1 Application(s) Topical <User Schedule>  chlorhexidine 4% Liquid 1 Application(s) Topical <User Schedule>  dextrose 50% Injectable 25 Gram(s) IV Push once  dextrose 50% Injectable 25 Gram(s) IV Push once  dextrose 50% Injectable 12.5 Gram(s) IV Push once  dextrose Oral Gel 15 Gram(s) Oral once PRN  diltiazem    milliGRAM(s) Oral daily  epoetin naz-epbx (RETACRIT) Injectable 31012 Unit(s) IV Push once  hydrALAZINE 25 milliGRAM(s) Oral three times a day  insulin lispro (ADMELOG) corrective regimen sliding scale   SubCutaneous at bedtime  insulin lispro (ADMELOG) corrective regimen sliding scale   SubCutaneous three times a day before meals  lidocaine   4% Patch 1 Patch Transdermal daily  metoprolol succinate  milliGRAM(s) Oral daily  oxycodone    5 mG/acetaminophen 325 mG 1 Tablet(s) Oral every 12 hours PRN  polyethylene glycol 3350 17 Gram(s) Oral two times a day  povidone iodine 10% Solution 1 Application(s) Topical daily  senna 2 Tablet(s) Oral at bedtime  sevelamer carbonate 1600 milliGRAM(s) Oral three times a day with meals  sodium chloride 0.9% lock flush 10 milliLiter(s) IV Push every 1 hour PRN      Review of Systems:  Review of systems otherwise negative except as previously noted.    Allergies: nitrofurantoin (Nephrotoxicity)    For details regarding the patient's past medical history, social history, family history, and other miscellaneous elements, please refer the initial infectious diseases consultation and/or the admitting history and physical examination for this admission.    Objective:  Vitals:   T(C): 36.4 (04-26-22 @ 11:24), Max: 36.5 (04-25-22 @ 21:03)  HR: 88 (04-26-22 @ 11:24) (88 - 103)  BP: 137/70 (04-26-22 @ 11:24) (122/61 - 150/79)  RR: 18 (04-26-22 @ 11:24) (18 - 18)  SpO2: 97% (04-26-22 @ 11:24) (97% - 99%)    Physical Examination:  General: no acute distress  HEENT: NC/AT, anicteric, neck supple  Respiratory: no acc muscle use, breathing comfortably  Cardiovascular: S1 and S2 present  Gastrointestinal: normal appearing, nondistended  Extremities: no edema, no cyanosis  Skin: no visible rash, right chest TDC        Laboratory Studies:  CBC:                       9.2    10.46 )-----------( 283      ( 26 Apr 2022 05:54 )             31.6     CMP: 04-26    132<L>  |  93<L>  |  80<H>  ----------------------------<  172<H>  4.8   |  19<L>  |  9.03<H>    Ca    8.9      26 Apr 2022 05:49  Phos  6.7     04-26  Mg     2.8     04-26    TPro  6.9  /  Alb  2.9<L>  /  TBili  0.4  /  DBili  x   /  AST  6<L>  /  ALT  <5<L>  /  AlkPhos  121<H>  04-25    LIVER FUNCTIONS - ( 25 Apr 2022 07:11 )  Alb: 2.9 g/dL / Pro: 6.9 g/dL / ALK PHOS: 121 U/L / ALT: <5 U/L / AST: 6 U/L / GGT: x               Microbiology: reviewed      Radiology: reviewed       [facility], Division of Infectious Diseases  ADRIANA Seals Y. Patel, S. Shah, G. [facility]  [phone]    Name: DYLAN DEL CASTILLO  Age: 67y  Gender: Male  MRN: [id number]    Interval History:  Patient seen and examined at bedside this morning  No acute overnight events. Afebrile  Son at bedside  Pt in chair configuration   Notes reviewed    Antibiotics:      Medications:  allopurinol 100 milliGRAM(s) Oral daily  apixaban 5 milliGRAM(s) Oral two times a day  aspirin enteric coated 81 milliGRAM(s) Oral daily  atorvastatin 40 milliGRAM(s) Oral at bedtime  bisacodyl 5 milliGRAM(s) Oral every 12 hours PRN  chlorhexidine 2% Cloths 1 Application(s) Topical <User Schedule>  chlorhexidine 4% Liquid 1 Application(s) Topical <User Schedule>  dextrose 50% Injectable 25 Gram(s) IV Push once  dextrose 50% Injectable 25 Gram(s) IV Push once  dextrose 50% Injectable 12.5 Gram(s) IV Push once  dextrose Oral Gel 15 Gram(s) Oral once PRN  diltiazem    milliGRAM(s) Oral daily  epoetin naz-epbx (RETACRIT) Injectable 32315 Unit(s) IV Push once  hydrALAZINE 25 milliGRAM(s) Oral three times a day  insulin lispro (ADMELOG) corrective regimen sliding scale   SubCutaneous at bedtime  insulin lispro (ADMELOG) corrective regimen sliding scale   SubCutaneous three times a day before meals  lidocaine   4% Patch 1 Patch Transdermal daily  metoprolol succinate  milliGRAM(s) Oral daily  oxycodone    5 mG/acetaminophen 325 mG 1 Tablet(s) Oral every 12 hours PRN  polyethylene glycol 3350 17 Gram(s) Oral two times a day  povidone iodine 10% Solution 1 Application(s) Topical daily  senna 2 Tablet(s) Oral at bedtime  sevelamer carbonate 1600 milliGRAM(s) Oral three times a day with meals  sodium chloride 0.9% lock flush 10 milliLiter(s) IV Push every 1 hour PRN      Review of Systems:  Review of systems otherwise negative except as previously noted.    Allergies: nitrofurantoin (Nephrotoxicity)    For details regarding the patient's past medical history, social history, family history, and other miscellaneous elements, please refer the initial infectious diseases consultation and/or the admitting history and physical examination for this admission.    Objective:  Vitals:   T(C): 36.4 (04-26-22 @ 11:24), Max: 36.5 (04-25-22 @ 21:03)  HR: 88 (04-26-22 @ 11:24) (88 - 103)  BP: 137/70 (04-26-22 @ 11:24) (122/61 - 150/79)  RR: 18 (04-26-22 @ 11:24) (18 - 18)  SpO2: 97% (04-26-22 @ 11:24) (97% - 99%)    Physical Examination:  General: no acute distress  HEENT: NC/AT, anicteric, neck supple  Respiratory: no acc muscle use, breathing comfortably  Cardiovascular: S1 and S2 present  Gastrointestinal: normal appearing, nondistended  Extremities: no edema, no cyanosis, chronic venous stasis changes  Skin: no visible rash, right chest TDC        Laboratory Studies:  CBC:                       9.2    10.46 )-----------( 283      ( 26 Apr 2022 05:54 )             31.6     CMP: 04-26    132<L>  |  93<L>  |  80<H>  ----------------------------<  172<H>  4.8   |  19<L>  |  9.03<H>    Ca    8.9      26 Apr 2022 05:49  Phos  6.7     04-26  Mg     2.8     04-26    TPro  6.9  /  Alb  2.9<L>  /  TBili  0.4  /  DBili  x   /  AST  6<L>  /  ALT  <5<L>  /  AlkPhos  121<H>  04-25    LIVER FUNCTIONS - ( 25 Apr 2022 07:11 )  Alb: 2.9 g/dL / Pro: 6.9 g/dL / ALK PHOS: 121 U/L / ALT: <5 U/L / AST: 6 U/L / GGT: x               Microbiology: reviewed      Radiology: reviewed

## 2022-04-26 NOTE — PROGRESS NOTE ADULT - ASSESSMENT
A/P    68 y/o M with history of CAD, s/p multiple PCI, with most recent 2/22 PCI of LAD in setting of NSTEMI, on ASA only (pradaxa), chronic atrial fibrillation maintained on Pradaxa, essential HTN, type 2 DM previously on oral medications but on insulin, diabetic neuropathy with chronic RIGHT LE venous stasis changes>left, LEFT foot ulcer seen by podiatry, past endarterectomy LEFT CEA in 2014 presenting with 1 week of decreased urine output, cystitis with hematuria     #ANT on CKD, new HD  -oliguric renal failure in setting of UTI/cystitis  -etiology ??  -New HD for uremia, renal failure  -sp rrt 4/12 for uncontrolled bleeding sp  right groin shiley removal  - monitor h/h - stable  -s/p L AVG 4/18  -s/p permacath placement 4/20  -renal f/ u    #AMS/Lethargy  -improved  -recent ams from pain meds  -med f/u     #Acute on chronic HFpEF  -stable  -continue aggressive volume removal with HD  -c/w  bb    #Chronic AF  -rates overall stable   -c/w metoprolol succ 100 mg qd  -c/w dilt cd 120mg daily    -c/w eliquis 5 mg BID for now - heme stable     #HTN  -stable  -c/w meds   -increase hydral if bp remains elevated     #CAD, s/p PCI, most recent 2/2022  -stable, cont asa  -off plavix due to a/c indication  -statin     #R carotid stenosis  -cont med tx  -MRA noted with Greater than 75% stenosis of the right distal common carotid artery. Approximately 60% stenosis of the proximal left internal carotid artery.  -Continue ASA and Statin Therapy  -vasc outpt f/u Dr Pinto    plan discussed with family at bedside

## 2022-04-26 NOTE — PROGRESS NOTE ADULT - SUBJECTIVE AND OBJECTIVE BOX
DATE OF SERVICE: 04-26-22 @ 13:38  CHIEF COMPLAINT:Patient is a 67y old  Male who presents with a chief complaint of Decreased urine output for the past week, leg oedema (26 Apr 2022 12:20)    	        PAST MEDICAL & SURGICAL HISTORY:  HTN (hypertension), benign    HLD (hyperlipidemia)    DM type 2 (diabetes mellitus, type 2)    TIA (transient ischemic attack)    Atrial fibrillation    MI (myocardial infarction)  circa 2014 and 2022.    CAD (coronary artery disease)    Neuropathy    Stage 3 chronic kidney disease    2019 novel coronavirus disease (COVID-19)  12/2021.  Received the COVID-19 vaccine x 2 as of April 2022.    Status post angioplasty with stent  LULU x 3 2/7/2014    S/P carotid endarterectomy  left            REVIEW OF SYSTEMS:  sleepy   arousable  NECK: No pain or stiffness  RESPIRATORY: No cough, wheezing, chills or hemoptysis; No Shortness of Breath  CARDIOVASCULAR: No chest pain, palpitations,  GASTROINTESTINAL: No abdominal or epigastric pain. No nausea, vomiting,   GENITOURINARY: No dysuria, frequency, hematuria,   NEUROLOGICAL: No headaches,     Medications:  MEDICATIONS  (STANDING):  allopurinol 100 milliGRAM(s) Oral daily  apixaban 5 milliGRAM(s) Oral two times a day  aspirin enteric coated 81 milliGRAM(s) Oral daily  atorvastatin 40 milliGRAM(s) Oral at bedtime  chlorhexidine 2% Cloths 1 Application(s) Topical <User Schedule>  chlorhexidine 4% Liquid 1 Application(s) Topical <User Schedule>  dextrose 50% Injectable 25 Gram(s) IV Push once  dextrose 50% Injectable 25 Gram(s) IV Push once  dextrose 50% Injectable 12.5 Gram(s) IV Push once  diltiazem    milliGRAM(s) Oral daily  hydrALAZINE 25 milliGRAM(s) Oral three times a day  insulin lispro (ADMELOG) corrective regimen sliding scale   SubCutaneous at bedtime  insulin lispro (ADMELOG) corrective regimen sliding scale   SubCutaneous three times a day before meals  lidocaine   4% Patch 1 Patch Transdermal daily  metoprolol succinate  milliGRAM(s) Oral daily  polyethylene glycol 3350 17 Gram(s) Oral two times a day  povidone iodine 10% Solution 1 Application(s) Topical daily  senna 2 Tablet(s) Oral at bedtime  sevelamer carbonate 1600 milliGRAM(s) Oral three times a day with meals    MEDICATIONS  (PRN):  bisacodyl 5 milliGRAM(s) Oral every 12 hours PRN Constipation  dextrose Oral Gel 15 Gram(s) Oral once PRN Blood Glucose LESS THAN 70 milliGRAM(s)/deciliter  oxycodone    5 mG/acetaminophen 325 mG 1 Tablet(s) Oral every 12 hours PRN Moderate Pain (4 - 6)  sodium chloride 0.9% lock flush 10 milliLiter(s) IV Push every 1 hour PRN Pre/post blood products, medications, blood draw, and to maintain line patency    	    PHYSICAL EXAM:  T(C): 36.6 (04-26-22 @ 12:25), Max: 36.6 (04-26-22 @ 12:25)  HR: 74 (04-26-22 @ 12:25) (74 - 103)  BP: 124/68 (04-26-22 @ 12:25) (122/61 - 150/79)  RR: 18 (04-26-22 @ 12:25) (18 - 18)  SpO2: 96% (04-26-22 @ 12:25) (96% - 99%)  Wt(kg): --  I&O's Summary    25 Apr 2022 07:01  -  26 Apr 2022 07:00  --------------------------------------------------------  IN: 300 mL / OUT: 0 mL / NET: 300 mL        Appearance: Normal	  HEENT:   Normal oral mucosa, PERRL, EOMI	    Cardiovascular: Normal S1 S2, No JVD,   Respiratory: Lungs clear to auscultation	  Psychiatry: A & O x  Gastrointestinal:  Soft, Non-tender, + BS	    Neurologic: Non-focal  Extremities:dec rom   luext avf    TELEMETRY: 	    ECG:  	  RADIOLOGY:  OTHER: 	  	  LABS:	 	    CARDIAC MARKERS:                                9.2    10.46 )-----------( 283      ( 26 Apr 2022 05:54 )             31.6     04-26    132<L>  |  93<L>  |  80<H>  ----------------------------<  172<H>  4.8   |  19<L>  |  9.03<H>    Ca    8.9      26 Apr 2022 05:49  Phos  6.7     04-26  Mg     2.8     04-26    TPro  6.9  /  Alb  2.9<L>  /  TBili  0.4  /  DBili  x   /  AST  6<L>  /  ALT  <5<L>  /  AlkPhos  121<H>  04-25    proBNP:   Lipid Profile:   HgA1c:   TSH:

## 2022-04-27 NOTE — PROGRESS NOTE ADULT - ASSESSMENT
67 year old gentleman with PMH of CAD, NSTEMI s/p 3 stents in 2014 (stents to LAD, proximal and distal LCx), ischemic cardiomyopathy, HTN for 10 years, T2DM for 26 years c/b peripheral neuropathy, CVA, TIA, Afib on Pradaxa, chronic RLE wound, L carotid stenosis s/p endarterectomy (2014) just recently admitted  to the [facility] on 2/19/2022 with acute onset chest pain for one day found to have an NSTEMI, CAD, s/p PCI in setting of increased AF rates off bb for 3 days and resolved chest pain, his CKMB peaked and Echo noted with EF 60%,normal LV function, mild TR, mild VA. He was s/p [facility] with 85% proximal LAD s/p balloon angioplasty and LULU. He presented to [facility] ED with decreased urine output over the week. [facility]. Stevens went to [facility] for hematuria and was started  on Nitrofurantoin. Pt is still taking Nitrofurantoin w/ 5 days left. He came to the ED due to worsening symptoms. Pt reports having a similar incident 4-5 years ago that resolved spontaneously. He reports increased leg edema Dyspnea worse on exertion. his baseline Serum creatinine is in the 2.0 to 2.3 mg/dl range. ANT with serum creatinine of 8.29 with bun 144 and k 5.5        1- ANT on ckd III   2- chf chronic   3- hyperphosphatemia /shpt   4- anemia   5- hyperlipidemia   6- HTN /a fib  7- TIA hx       No HD today  renvela 2 tab with meals   anemia - epogen 06422 Units TIW with HD.  percocet started for pain, home med  CT head results pending for AMS this am  PT  dc planning as per primary team

## 2022-04-27 NOTE — PROGRESS NOTE ADULT - SUBJECTIVE AND OBJECTIVE BOX
[facility]   [phone]  RENAL FOLLOW UP NOTE  --------------------------------------------------------------------------------  Chief Complaint:    24 hour events/subjective:    Patient seen and examined  RRT this am for AMS and R side weakness  alert when seen    PAST HISTORY  --------------------------------------------------------------------------------  No significant changes to PMH, PSH, FHx, SHx, unless otherwise noted    ALLERGIES & MEDICATIONS  --------------------------------------------------------------------------------  Allergies    nitrofurantoin (Nephrotoxicity)    Intolerances      Standing Inpatient Medications  allopurinol 100 milliGRAM(s) Oral daily  apixaban 5 milliGRAM(s) Oral two times a day  aspirin enteric coated 81 milliGRAM(s) Oral daily  atorvastatin 40 milliGRAM(s) Oral at bedtime  chlorhexidine 2% Cloths 1 Application(s) Topical <User Schedule>  chlorhexidine 4% Liquid 1 Application(s) Topical <User Schedule>  dextrose 50% Injectable 25 Gram(s) IV Push once  dextrose 50% Injectable 25 Gram(s) IV Push once  dextrose 50% Injectable 12.5 Gram(s) IV Push once  diltiazem    milliGRAM(s) Oral daily  hydrALAZINE 25 milliGRAM(s) Oral three times a day  insulin lispro (ADMELOG) corrective regimen sliding scale   SubCutaneous three times a day before meals  insulin lispro (ADMELOG) corrective regimen sliding scale   SubCutaneous at bedtime  lidocaine   4% Patch 1 Patch Transdermal daily  metoprolol succinate  milliGRAM(s) Oral daily  polyethylene glycol 3350 17 Gram(s) Oral two times a day  povidone iodine 10% Solution 1 Application(s) Topical daily  senna 2 Tablet(s) Oral at bedtime  sevelamer carbonate 1600 milliGRAM(s) Oral three times a day with meals    PRN Inpatient Medications  bisacodyl 5 milliGRAM(s) Oral every 12 hours PRN  dextrose Oral Gel 15 Gram(s) Oral once PRN  sodium chloride 0.9% lock flush 10 milliLiter(s) IV Push every 1 hour PRN      REVIEW OF SYSTEMS  --------------------------------------------------------------------------------    Gen: denies fevers/chills,  CVS: denies chest pain/palpitations  Resp: denies SOB/Cough  GI: Denies N/V/Abd pain  : Denies dysuria    VITALS/PHYSICAL EXAM  --------------------------------------------------------------------------------  T(C): 36.4 (04-27-22 @ 11:32), Max: 36.6 (04-26-22 @ 20:21)  HR: 85 (04-27-22 @ 11:32) (82 - 108)  BP: 131/64 (04-27-22 @ 11:32) (124/61 - 152/75)  RR: 18 (04-27-22 @ 11:32) (18 - 18)  SpO2: 95% (04-27-22 @ 11:32) (95% - 98%)  Wt(kg): --        04-26-22 @ 07:01  -  04-27-22 @ 07:00  --------------------------------------------------------  IN: 800 mL / OUT: 2600 mL / NET: -1800 mL      Physical Exam:  	              Gen: Appears comfortable, but overall more responsive   	Pulm: Decreased breath sounds b/l bases.  	CV: No JVD. IRR, tachy  	Abd: +BS, soft, nontender, softly distended, obese               Extremity UE: increased warmth, R hand, increased warmth, swollen              Extremity LE: + hyperpigmented bilateral LE with B/L  no edema              Vascular: R IJ HD catheter, L arm AVF     LABS/STUDIES  --------------------------------------------------------------------------------              9.8    10.26 >-----------<  303      [04-27-22 @ 07:02]              34.7     136  |  98  |  48  ----------------------------<  179      [04-27-22 @ 07:02]  4.6   |  21  |  6.81        Ca     8.9     [04-27-22 @ 07:02]      Mg     2.5     [04-27-22 @ 07:02]      Phos  5.0     [04-27-22 @ 07:02]            Creatinine Trend:  SCr 6.81 [04-27 @ 07:02]  SCr 9.03 [04-26 @ 05:49]  SCr 8.06 [04-25 @ 07:11]  SCr 5.91 [04-24 @ 07:13]  SCr 7.94 [04-23 @ 07:09]              Urinalysis - [04-03-22 @ 00:48]      Color Light Orange / Appearance Turbid / SG 1.021 / pH 5.5      Gluc Negative / Ketone Negative  / Bili Negative / Urobili Negative       Blood Large / Protein 300 mg/dL / Leuk Est Large / Nitrite Negative      RBC 20 /  / Hyaline 10 / Gran  / Sq Epi  / Non Sq Epi 3 / Bacteria Negative      Iron 16, TIBC 239, %sat 7      [04-15-22 @ 10:16]  Ferritin 172      [04-15-22 @ 09:05]  PTH -- (Ca 8.3)      [04-15-22 @ 12:18]   150  PTH -- (Ca --)      [04-03-22 @ 23:06]   97  HbA1c 11.7      [11-07-19 @ 09:14]  TSH 1.98      [04-05-22 @ 05:19]    DEREJE: titer Negative, pattern Negative      [04-11-22 @ 16:24]  C3 Complement 61      [04-11-22 @ 16:56]  C4 Complement 28      [04-11-22 @ 16:56]  ANCA: cANCA Negative, pANCA Negative, atypical ANCA Indeterminate Method interference due to DEREJE fluorescence      [04-11-22 @ 16:24]  anti-GBM 3      [04-11-22 @ 19:27]  Free Light Chains: kappa 16.92, lambda 12.63, ratio = 1.34      [04-11 @ 16:56]  Immunofixation Serum: No Monoclonal Band Identified    Reference Range: None Detected      [04-11-22 @ 16:56]  SPEP Interpretation: Normal Electrophoresis Pattern      [04-11-22 @ 16:56]

## 2022-04-27 NOTE — PROVIDER CONTACT NOTE (MEDICATION) - SITUATION
patient agitated, screaming & refusing PO medications at this time Detail Level: Detailed Size Of Lesion: .2 x .1 cm

## 2022-04-27 NOTE — PROGRESS NOTE ADULT - SUBJECTIVE AND OBJECTIVE BOX
DATE OF SERVICE: 04-27-22 @ 12:30  CHIEF COMPLAINT:Patient is a 67y old  Male who presents with a chief complaint of Decreased urine output for the past week, leg oedema (27 Apr 2022 11:51)    	        PAST MEDICAL & SURGICAL HISTORY:  HTN (hypertension), benign    HLD (hyperlipidemia)    DM type 2 (diabetes mellitus, type 2)    TIA (transient ischemic attack)    Atrial fibrillation    MI (myocardial infarction)  circa 2014 and 2022.    CAD (coronary artery disease)    Neuropathy    Stage 3 chronic kidney disease    2019 novel coronavirus disease (COVID-19)  12/2021.  Received the COVID-19 vaccine x 2 as of April 2022.    Status post angioplasty with stent  LULU x 3 2/7/2014    S/P carotid endarterectomy  left            lethargic this am   arousable  moves all extremities  responds to questions  no cp or sob      Medications:  MEDICATIONS  (STANDING):  allopurinol 100 milliGRAM(s) Oral daily  apixaban 5 milliGRAM(s) Oral two times a day  aspirin enteric coated 81 milliGRAM(s) Oral daily  atorvastatin 40 milliGRAM(s) Oral at bedtime  chlorhexidine 2% Cloths 1 Application(s) Topical <User Schedule>  chlorhexidine 4% Liquid 1 Application(s) Topical <User Schedule>  dextrose 50% Injectable 25 Gram(s) IV Push once  dextrose 50% Injectable 12.5 Gram(s) IV Push once  dextrose 50% Injectable 25 Gram(s) IV Push once  diltiazem    milliGRAM(s) Oral daily  hydrALAZINE 25 milliGRAM(s) Oral three times a day  insulin lispro (ADMELOG) corrective regimen sliding scale   SubCutaneous at bedtime  insulin lispro (ADMELOG) corrective regimen sliding scale   SubCutaneous three times a day before meals  lidocaine   4% Patch 1 Patch Transdermal daily  metoprolol succinate  milliGRAM(s) Oral daily  polyethylene glycol 3350 17 Gram(s) Oral two times a day  povidone iodine 10% Solution 1 Application(s) Topical daily  senna 2 Tablet(s) Oral at bedtime  sevelamer carbonate 1600 milliGRAM(s) Oral three times a day with meals    MEDICATIONS  (PRN):  bisacodyl 5 milliGRAM(s) Oral every 12 hours PRN Constipation  dextrose Oral Gel 15 Gram(s) Oral once PRN Blood Glucose LESS THAN 70 milliGRAM(s)/deciliter  oxycodone    5 mG/acetaminophen 325 mG 1 Tablet(s) Oral every 12 hours PRN Moderate Pain (4 - 6)  sodium chloride 0.9% lock flush 10 milliLiter(s) IV Push every 1 hour PRN Pre/post blood products, medications, blood draw, and to maintain line patency    	    PHYSICAL EXAM:  T(C): 36.4 (04-27-22 @ 11:32), Max: 36.6 (04-26-22 @ 20:21)  HR: 85 (04-27-22 @ 11:32) (82 - 108)  BP: 131/64 (04-27-22 @ 11:32) (124/61 - 152/75)  RR: 18 (04-27-22 @ 11:32) (18 - 18)  SpO2: 95% (04-27-22 @ 11:32) (95% - 98%)  Wt(kg): --  I&O's Summary    26 Apr 2022 07:01  -  27 Apr 2022 07:00  --------------------------------------------------------  IN: 800 mL / OUT: 2600 mL / NET: -1800 mL        Appearance: Normal	  HEENT:   Normal oral mucosa, PERRL, EOMI	    Cardiovascular: reg irr  Respiratory: Lungs clear to auscultation	    Gastrointestinal:  Soft, Non-tender, + BS	  	  Neurologic: Non-focal  Extremities: dec rom     TELEMETRY: 	    ECG:  	  RADIOLOGY:  OTHER: 	  	  LABS:	 	    CARDIAC MARKERS:                                9.8    10.26 )-----------( 303      ( 27 Apr 2022 07:02 )             34.7     04-27    136  |  98  |  48<H>  ----------------------------<  179<H>  4.6   |  21<L>  |  6.81<H>    Ca    8.9      27 Apr 2022 07:02  Phos  5.0     04-27  Mg     2.5     04-27      proBNP:   Lipid Profile:   HgA1c:   TSH:

## 2022-04-27 NOTE — PROGRESS NOTE ADULT - ASSESSMENT
·  Problem: Acute kidney injury superimposed on CKD. pt currently on hd  to cont as per renal  avf done luext   perm cath done        Lethargy resolving  got percocet this am for pain  d/c narcotic          Problem/Plan - 2:  ·  Problem: Chronic atrial fibrillation. c/w a/c  cards f/u     Problem/Plan - 3:  ·  Problem: Cystitis.   treatment as per id  s/p abs     Problem/Plan - 4:  ·  Problem: Type 2 diabetes mellitus. c/w insulin   endo f/u     Problem/Plan - 5:  ·  Problem: CAD (coronary artery disease).   stable  cards f/u       s/p shiley removal / groin w/ bleeding resolved      constipation better  bowel regimen      gouty pain  better    discussed with patient's wife   at bedside      rehab planning    discussed w/ son

## 2022-04-27 NOTE — PROGRESS NOTE ADULT - ASSESSMENT
A/P    68 y/o M with history of CAD, s/p multiple PCI, with most recent 2/22 PCI of LAD in setting of NSTEMI, on ASA only (pradaxa), chronic atrial fibrillation maintained on Pradaxa, essential HTN, type 2 DM previously on oral medications but on insulin, diabetic neuropathy with chronic RIGHT LE venous stasis changes>left, LEFT foot ulcer seen by podiatry, past endarterectomy LEFT CEA in 2014 presenting with 1 week of decreased urine output, cystitis with hematuria     #ANT on CKD, new HD  -oliguric renal failure in setting of UTI/cystitis  -etiology ??  -New HD for uremia, renal failure  -sp rrt 4/12 for uncontrolled bleeding sp  right groin shiley removal  - monitor h/h - stable  -s/p L AVG 4/18  -s/p permacath placement 4/20  -renal f/ u    #AMS/Lethargy  -recent ams from pain meds  -AMS again today , unable to follow commands-- did receive pain med at 945   -RN/ACP at bedside- VSS. recommend urgent CT scan / rrt   -med f/u     #Acute on chronic HFpEF  -stable  -continue aggressive volume removal with HD  -c/w  bb    #Chronic AF  -rates overall stable   -c/w metoprolol succ 100 mg qd  -c/w dilt cd 120mg daily    -c/w eliquis 5 mg BID for now - heme stable     #HTN  -stable  -c/w meds   -increase hydral if bp remains elevated     #CAD, s/p PCI, most recent 2/2022  -stable, cont asa  -off plavix due to a/c indication  -statin     #R carotid stenosis  -cont med tx  -MRA noted with Greater than 75% stenosis of the right distal common carotid artery. Approximately 60% stenosis of the proximal left internal carotid artery.  -Continue ASA and Statin Therapy  -vasc outpt f/u Dr Pinto    plan discussed with family at bedside / ACP/ RN

## 2022-04-27 NOTE — PROGRESS NOTE ADULT - SUBJECTIVE AND OBJECTIVE BOX
CARDIOLOGY FOLLOW UP - Dr. Mazariegos  DATE OF SERVICE: 4/27/22     CC pt was sitting in chair , confused, unable to answer questions   -meds reviewed- received oxycodone approx 945 am  -son at bedside     REVIEW OF SYSTEMS:  unable to assess     PHYSICAL EXAM:  T(C): 36.4 (04-27-22 @ 11:32), Max: 36.6 (04-26-22 @ 12:25)  HR: 85 (04-27-22 @ 11:32) (74 - 108)  BP: 131/64 (04-27-22 @ 11:32) (124/61 - 152/75)  RR: 18 (04-27-22 @ 11:32) (18 - 18)  SpO2: 95% (04-27-22 @ 11:32) (95% - 98%)  Wt(kg): --  I&O's Summary    26 Apr 2022 07:01  -  27 Apr 2022 07:00  --------------------------------------------------------  IN: 800 mL / OUT: 2600 mL / NET: -1800 mL        Appearance: AMS , confused 	  Cardiovascular: Normal S1 S2,irreg    Respiratory: Lungs clear to auscultation	  Gastrointestinal:  Soft, Non-tender, + BS	  Extremities: Normal range of motion, No clubbing, cyanosis or edema      Home Medications:  allopurinol 100 mg oral tablet: 1 tab(s) orally once a day (03 Apr 2022 06:34)  aspirin 81 mg oral delayed release tablet: 1 tab(s) orally once a day (03 Apr 2022 06:34)  bumetanide 2 mg oral tablet: 1 tab(s) orally once a day (03 Apr 2022 06:34)  insulin glargine 100 units/mL subcutaneous solution: 25 unit(s) subcutaneous once a day (at bedtime) (03 Apr 2022 06:34)  metOLazone 5 mg oral tablet: 1 tab(s) orally 3 times a week (03 Apr 2022 06:34)  metoprolol succinate 50 mg oral tablet, extended release: 2 tab(s) orally once a day (03 Apr 2022 06:34)  NovoLOG 100 units/mL subcutaneous solution: subcutaneous 4 times a day (before meals and at bedtime) (03 Apr 2022 06:34)  NovoLOG FlexPen 100 units/mL injectable solution: 10 unit(s) subcutaneous 3 times a day (03 Apr 2022 06:34)  oxycodone-acetaminophen 5 mg-325 mg oral tablet: 1 tab(s) orally 2 times a day (03 Apr 2022 06:34)  Pradaxa 150 mg oral capsule: 1 cap(s) orally 2 times a day (03 Apr 2022 06:34)  pregabalin 100 mg oral capsule: 1 cap(s) orally 3 times a day (03 Apr 2022 06:34)      MEDICATIONS  (STANDING):  allopurinol 100 milliGRAM(s) Oral daily  apixaban 5 milliGRAM(s) Oral two times a day  aspirin enteric coated 81 milliGRAM(s) Oral daily  atorvastatin 40 milliGRAM(s) Oral at bedtime  chlorhexidine 2% Cloths 1 Application(s) Topical <User Schedule>  chlorhexidine 4% Liquid 1 Application(s) Topical <User Schedule>  dextrose 50% Injectable 25 Gram(s) IV Push once  dextrose 50% Injectable 25 Gram(s) IV Push once  dextrose 50% Injectable 12.5 Gram(s) IV Push once  diltiazem    milliGRAM(s) Oral daily  hydrALAZINE 25 milliGRAM(s) Oral three times a day  insulin lispro (ADMELOG) corrective regimen sliding scale   SubCutaneous three times a day before meals  insulin lispro (ADMELOG) corrective regimen sliding scale   SubCutaneous at bedtime  lidocaine   4% Patch 1 Patch Transdermal daily  metoprolol succinate  milliGRAM(s) Oral daily  polyethylene glycol 3350 17 Gram(s) Oral two times a day  povidone iodine 10% Solution 1 Application(s) Topical daily  senna 2 Tablet(s) Oral at bedtime  sevelamer carbonate 1600 milliGRAM(s) Oral three times a day with meals      TELEMETRY:afibhr 70 	    ECG:  	  RADIOLOGY:   DIAGNOSTIC TESTING:  [ ] Echocardiogram:  [ ]  Catheterization:  [ ] Stress Test:    OTHER: 	    LABS:	 	                            9.8    10.26 )-----------( 303      ( 27 Apr 2022 07:02 )             34.7     04-27    136  |  98  |  48<H>  ----------------------------<  179<H>  4.6   |  21<L>  |  6.81<H>    Ca    8.9      27 Apr 2022 07:02  Phos  5.0     04-27  Mg     2.5     04-27

## 2022-04-27 NOTE — PROGRESS NOTE ADULT - SUBJECTIVE AND OBJECTIVE BOX
[facility], Division of Infectious Diseases  ADRIANA Seals Y. Patel, S. Shah, G. [facility]  [phone]    Name: DYLAN DEL CASTILLO  Age: 67y  Gender: Male  MRN: [id number]    Interval History:  No acute overnight events. Afebrile  Notes reviewed    Antibiotics:      Medications:  allopurinol 100 milliGRAM(s) Oral daily  apixaban 5 milliGRAM(s) Oral two times a day  aspirin enteric coated 81 milliGRAM(s) Oral daily  atorvastatin 40 milliGRAM(s) Oral at bedtime  bisacodyl 5 milliGRAM(s) Oral every 12 hours PRN  chlorhexidine 2% Cloths 1 Application(s) Topical <User Schedule>  chlorhexidine 4% Liquid 1 Application(s) Topical <User Schedule>  dextrose 50% Injectable 25 Gram(s) IV Push once  dextrose 50% Injectable 25 Gram(s) IV Push once  dextrose 50% Injectable 12.5 Gram(s) IV Push once  dextrose Oral Gel 15 Gram(s) Oral once PRN  diltiazem    milliGRAM(s) Oral daily  hydrALAZINE 25 milliGRAM(s) Oral three times a day  insulin lispro (ADMELOG) corrective regimen sliding scale   SubCutaneous three times a day before meals  insulin lispro (ADMELOG) corrective regimen sliding scale   SubCutaneous at bedtime  lidocaine   4% Patch 1 Patch Transdermal daily  metoprolol succinate  milliGRAM(s) Oral daily  oxycodone    5 mG/acetaminophen 325 mG 1 Tablet(s) Oral every 12 hours PRN  polyethylene glycol 3350 17 Gram(s) Oral two times a day  povidone iodine 10% Solution 1 Application(s) Topical daily  senna 2 Tablet(s) Oral at bedtime  sevelamer carbonate 1600 milliGRAM(s) Oral three times a day with meals  sodium chloride 0.9% lock flush 10 milliLiter(s) IV Push every 1 hour PRN      Review of Systems:  Review of systems otherwise negative except as previously noted.    Allergies: nitrofurantoin (Nephrotoxicity)    For details regarding the patient's past medical history, social history, family history, and other miscellaneous elements, please refer the initial infectious diseases consultation and/or the admitting history and physical examination for this admission.    Objective:  Vitals:   T(C): 36.4 (04-27-22 @ 04:22), Max: 36.6 (04-26-22 @ 12:25)  HR: 82 (04-27-22 @ 04:22) (74 - 108)  BP: 152/75 (04-27-22 @ 04:22) (124/61 - 152/75)  RR: 18 (04-27-22 @ 04:22) (18 - 18)  SpO2: 98% (04-27-22 @ 04:22) (95% - 98%)    Physical Examination:  General: no acute distress  HEENT: NC/AT, anicteric, neck supple  Respiratory: no acc muscle use, breathing comfortably  Cardiovascular: S1 and S2 present  Gastrointestinal: normal appearing, nondistended  Extremities: no edema, no cyanosis, chronic venous stasis changes  Skin: no visible rash, right chest TDC      Laboratory Studies:  CBC:                       9.8    10.26 )-----------( 303      ( 27 Apr 2022 07:02 )             34.7     CMP: 04-27    136  |  98  |  48<H>  ----------------------------<  179<H>  4.6   |  21<L>  |  6.81<H>    Ca    8.9      27 Apr 2022 07:02  Phos  5.0     04-27  Mg     2.5     04-27        Microbiology: reviewed      Radiology: reviewed       [facility], Division of Infectious Diseases  ADRIANA Seals Y. Patel, S. Shah, G. [facility]  [phone]    Name: DYLAN DEL CASTILLO  Age: 67y  Gender: Male  MRN: [id number]    Interval History:  Pt seen and examined at bedside   Son at bedside  No acute overnight events. Afebrile  Notes reviewed    Antibiotics:      Medications:  allopurinol 100 milliGRAM(s) Oral daily  apixaban 5 milliGRAM(s) Oral two times a day  aspirin enteric coated 81 milliGRAM(s) Oral daily  atorvastatin 40 milliGRAM(s) Oral at bedtime  bisacodyl 5 milliGRAM(s) Oral every 12 hours PRN  chlorhexidine 2% Cloths 1 Application(s) Topical <User Schedule>  chlorhexidine 4% Liquid 1 Application(s) Topical <User Schedule>  dextrose 50% Injectable 25 Gram(s) IV Push once  dextrose 50% Injectable 25 Gram(s) IV Push once  dextrose 50% Injectable 12.5 Gram(s) IV Push once  dextrose Oral Gel 15 Gram(s) Oral once PRN  diltiazem    milliGRAM(s) Oral daily  hydrALAZINE 25 milliGRAM(s) Oral three times a day  insulin lispro (ADMELOG) corrective regimen sliding scale   SubCutaneous three times a day before meals  insulin lispro (ADMELOG) corrective regimen sliding scale   SubCutaneous at bedtime  lidocaine   4% Patch 1 Patch Transdermal daily  metoprolol succinate  milliGRAM(s) Oral daily  oxycodone    5 mG/acetaminophen 325 mG 1 Tablet(s) Oral every 12 hours PRN  polyethylene glycol 3350 17 Gram(s) Oral two times a day  povidone iodine 10% Solution 1 Application(s) Topical daily  senna 2 Tablet(s) Oral at bedtime  sevelamer carbonate 1600 milliGRAM(s) Oral three times a day with meals  sodium chloride 0.9% lock flush 10 milliLiter(s) IV Push every 1 hour PRN      Review of Systems:  Review of systems otherwise negative except as previously noted.    Allergies: nitrofurantoin (Nephrotoxicity)    For details regarding the patient's past medical history, social history, family history, and other miscellaneous elements, please refer the initial infectious diseases consultation and/or the admitting history and physical examination for this admission.    Objective:  Vitals:   T(C): 36.4 (04-27-22 @ 04:22), Max: 36.6 (04-26-22 @ 12:25)  HR: 82 (04-27-22 @ 04:22) (74 - 108)  BP: 152/75 (04-27-22 @ 04:22) (124/61 - 152/75)  RR: 18 (04-27-22 @ 04:22) (18 - 18)  SpO2: 98% (04-27-22 @ 04:22) (95% - 98%)    Physical Examination:  General: no acute distress  HEENT: NC/AT, anicteric, neck supple  Respiratory: no acc muscle use, breathing comfortably  Cardiovascular: S1 and S2 present  Gastrointestinal: normal appearing, nondistended  Extremities: no edema, no cyanosis, chronic venous stasis changes  Skin: no visible rash, right chest TDC      Laboratory Studies:  CBC:                       9.8    10.26 )-----------( 303      ( 27 Apr 2022 07:02 )             34.7     CMP: 04-27    136  |  98  |  48<H>  ----------------------------<  179<H>  4.6   |  21<L>  |  6.81<H>    Ca    8.9      27 Apr 2022 07:02  Phos  5.0     04-27  Mg     2.5     04-27        Microbiology: reviewed      Radiology: reviewed

## 2022-04-27 NOTE — PROGRESS NOTE ADULT - ASSESSMENT
Patient is a 67 year old male with PMH of CAD with past multiple PCI, with most recent discharge from [facility] in Feb 2022 for NSTEMI with LULU of an 85% prox LAD lesion with patient on ASA and now off Plavix per cardiology (only for one month), chronic afib on Pradaxa, HTN, type 2 DM on insulin, diabetic neuropathy with chronic RIGHT LE venous stasis changes>left, LEFT foot ulcer seen by podiatry, past endarterectomy LEFT CEA in 2014 who presented with UTI with hematuria diagnosed at urgent care and now with decreased urine output.   Plan to take patient to OR Monday 4/18 for LUE AVF creation, possible graft placement.    Acute cystitis  Leukocytosis  Noted dark urine with hematuria, went to UC and was prescribed macrobid, had some improvement in urine color but then noticed decreased urine output with no voiding reported in last 2 days  UA here with pyuria -  with large LE, negative nitrites and no bacteria. UCx/BCx NGTD  Imaging: CTAP reviewed - no hydronephrosis, nephrolithiasis, or evidence of obstructive uropathy  S/p ceftriaxone x7 day course completed 4/9  C/w monitoring off Abx.   Trend temps/WBC--Leukocytosis resolved    ANT on CKD3  Nephrology following, appreciate recs  s/p placement of shiley and initiation of HD; s/p permcath placement 4/20  monitor Cr   renally dose meds   Avoid nephrotoxic agents    B/l LE edema with chronic venous stasis  Acute on chronic HFpEF  legs cool to touch, nontender, chronic changes with no sign of infection/cellulitis  has wound on bottom of L foot - dry and does not appear infected either  elevate lower extremities      DM2 with neuropathy  Blood glucose management per primary team.    Infectious Diseases will continue to follow. Please call with any questions.   Annie King M.D.  [facility], Division of Infectious Diseases [phone]

## 2022-04-27 NOTE — CHART NOTE - NSCHARTNOTEFT_GEN_A_CORE
S/P RRT for lethargy and decreased mental status - hemodynamically stable  Head CT - no acute pathology  AMS likely sec to percocet that pt got at 9AM - discontinued Percocet  Now with improved mental status  Pt medically cleared for discharge to Rehab per Dr. Corral - pending placement    [id number]

## 2022-04-27 NOTE — PROGRESS NOTE ADULT - SUBJECTIVE AND OBJECTIVE BOX
Follow-up Pulm Progress Note    pts son at bedside  alert, but confused today  Denies SOB/CP  Sats 98% RA    Medications:  MEDICATIONS  (STANDING):  allopurinol 100 milliGRAM(s) Oral daily  apixaban 5 milliGRAM(s) Oral two times a day  aspirin enteric coated 81 milliGRAM(s) Oral daily  atorvastatin 40 milliGRAM(s) Oral at bedtime  chlorhexidine 2% Cloths 1 Application(s) Topical <User Schedule>  chlorhexidine 4% Liquid 1 Application(s) Topical <User Schedule>  dextrose 50% Injectable 25 Gram(s) IV Push once  dextrose 50% Injectable 25 Gram(s) IV Push once  dextrose 50% Injectable 12.5 Gram(s) IV Push once  diltiazem    milliGRAM(s) Oral daily  hydrALAZINE 25 milliGRAM(s) Oral three times a day  insulin lispro (ADMELOG) corrective regimen sliding scale   SubCutaneous three times a day before meals  insulin lispro (ADMELOG) corrective regimen sliding scale   SubCutaneous at bedtime  lidocaine   4% Patch 1 Patch Transdermal daily  metoprolol succinate  milliGRAM(s) Oral daily  polyethylene glycol 3350 17 Gram(s) Oral two times a day  povidone iodine 10% Solution 1 Application(s) Topical daily  senna 2 Tablet(s) Oral at bedtime  sevelamer carbonate 1600 milliGRAM(s) Oral three times a day with meals    MEDICATIONS  (PRN):  bisacodyl 5 milliGRAM(s) Oral every 12 hours PRN Constipation  dextrose Oral Gel 15 Gram(s) Oral once PRN Blood Glucose LESS THAN 70 milliGRAM(s)/deciliter  oxycodone    5 mG/acetaminophen 325 mG 1 Tablet(s) Oral every 12 hours PRN Moderate Pain (4 - 6)  sodium chloride 0.9% lock flush 10 milliLiter(s) IV Push every 1 hour PRN Pre/post blood products, medications, blood draw, and to maintain line patency          Vital Signs Last 24 Hrs  T(C): 36.4 (27 Apr 2022 04:22), Max: 36.6 (26 Apr 2022 12:25)  T(F): 97.6 (27 Apr 2022 04:22), Max: 97.9 (26 Apr 2022 12:25)  HR: 82 (27 Apr 2022 04:22) (74 - 108)  BP: 152/75 (27 Apr 2022 04:22) (124/61 - 152/75)  BP(mean): --  RR: 18 (27 Apr 2022 04:22) (18 - 18)  SpO2: 98% (27 Apr 2022 04:22) (95% - 98%)          04-26 @ 07:01  -  04-27 @ 07:00  --------------------------------------------------------  IN: 800 mL / OUT: 2600 mL / NET: -1800 mL          LABS:                        9.8    10.26 )-----------( 303      ( 27 Apr 2022 07:02 )             34.7     04-27    136  |  98  |  48<H>  ----------------------------<  179<H>  4.6   |  21<L>  |  6.81<H>    Ca    8.9      27 Apr 2022 07:02  Phos  5.0     04-27  Mg     2.5     04-27            CAPILLARY BLOOD GLUCOSE      POCT Blood Glucose.: 186 mg/dL (26 Apr 2022 20:55)            DEREJE Negative 04-11 @ 16:24  Anti SS-1 --  Anti SS-2 --  Anti RNP --  RF -- 04-11 @ 16:24    Atypical ANCA Indeterminate Method interference due to DEREJE fluorescence 04-11 @ 16:24  c-ANCA titer -- 04-11 @ 16:24  c-ANCA Negative 04-11 @ 16:24  p-ANCA Negative 04-11 @ 16:24            Physical Examination:  PULM: Clear to auscultation bilaterally, no significant sputum production  CVS: S1, S2 heard      RADIOLOGY REVIEWED  CXR 4/11: unchanged b/l effusions, atelectasis    CT chest: < from: CT Chest No Cont (04.04.22 @ 16:52) >  FINDINGS:    AIRWAYS, LUNGS, PLEURA: Tracheal secretions. Small left greater than   right pleural effusions increased compared to 2/19/2022. Right-sided   pleural thickening. Lingular and left greater than right basilar   opacification, likely atelectasis.    MEDIASTINUM: Cardiomegaly. No pericardial effusion. Thoracic aorta normal   caliber.  No large mediastinal lymph nodes.    IMAGED ABDOMEN: Nonspecific perinephric stranding.    SOFT TISSUES: Unremarkable.    BONES: Unremarkable.      IMPRESSION:.    Small left greater than right bilateral pleural effusions increased in   size compared to 2/19/2022.    Lingular and left greater than right basilar opacification, likely   atelectasis.    --- End of Report ---    < end of copied text >

## 2022-04-28 NOTE — PROGRESS NOTE ADULT - PROBLEM SELECTOR PLAN 5
-ABG with no CO2 retention, likely 2nd to Lyrica?  -Mental status overall improved. Still with intermittent confusion.

## 2022-04-28 NOTE — PROGRESS NOTE ADULT - SUBJECTIVE AND OBJECTIVE BOX
[facility]   [phone]  RENAL FOLLOW UP NOTE  --------------------------------------------------------------------------------  Chief Complaint:    24 hour events/subjective:    seen earlier  overall lethargic but responsive     PAST HISTORY  --------------------------------------------------------------------------------  No significant changes to PMH, PSH, FHx, SHx, unless otherwise noted    ALLERGIES & MEDICATIONS  --------------------------------------------------------------------------------  Allergies    nitrofurantoin (Nephrotoxicity)    Intolerances      Standing Inpatient Medications  allopurinol 100 milliGRAM(s) Oral daily  apixaban 5 milliGRAM(s) Oral two times a day  aspirin enteric coated 81 milliGRAM(s) Oral daily  atorvastatin 40 milliGRAM(s) Oral at bedtime  chlorhexidine 2% Cloths 1 Application(s) Topical <User Schedule>  chlorhexidine 4% Liquid 1 Application(s) Topical <User Schedule>  dextrose 50% Injectable 25 Gram(s) IV Push once  dextrose 50% Injectable 25 Gram(s) IV Push once  dextrose 50% Injectable 12.5 Gram(s) IV Push once  diltiazem    milliGRAM(s) Oral daily  hydrALAZINE 25 milliGRAM(s) Oral three times a day  insulin lispro (ADMELOG) corrective regimen sliding scale   SubCutaneous at bedtime  insulin lispro (ADMELOG) corrective regimen sliding scale   SubCutaneous three times a day before meals  lidocaine   4% Patch 1 Patch Transdermal daily  metoprolol succinate  milliGRAM(s) Oral daily  polyethylene glycol 3350 17 Gram(s) Oral two times a day  povidone iodine 10% Solution 1 Application(s) Topical daily  senna 2 Tablet(s) Oral at bedtime  sevelamer carbonate 1600 milliGRAM(s) Oral three times a day with meals    PRN Inpatient Medications  bisacodyl 5 milliGRAM(s) Oral every 12 hours PRN  dextrose Oral Gel 15 Gram(s) Oral once PRN  sodium chloride 0.9% lock flush 10 milliLiter(s) IV Push every 1 hour PRN      REVIEW OF SYSTEMS  --------------------------------------------------------------------------------    not giving ros     VITALS/PHYSICAL EXAM  --------------------------------------------------------------------------------  T(C): 36.2 (04-28-22 @ 19:28), Max: 36.7 (04-27-22 @ 21:14)  HR: 84 (04-28-22 @ 19:28) (70 - 85)  BP: 149/64 (04-28-22 @ 19:28) (131/66 - 149/64)  RR: 18 (04-28-22 @ 19:28) (18 - 18)  SpO2: 98% (04-28-22 @ 19:28) (97% - 99%)  Wt(kg): --        04-28-22 @ 07:01  -  04-28-22 @ 19:55  --------------------------------------------------------  IN: 240 mL / OUT: 0 mL / NET: 240 mL      Physical Exam:  	         Gen: Appears comfortable, but overall less  responsive   	Pulm: Decreased breath sounds b/l bases.  	CV: No JVD. IRR, tachy  	Abd: +BS, soft, nontender, softly distended, obese               Extremity UE: increased warmth, R hand, increased warmth, swollen              Extremity LE: + hyperpigmented bilateral LE with B/L  no edema              Vascular: R IJ HD catheter, L arm AVF       LABS/STUDIES  --------------------------------------------------------------------------------              9.7    10.33 >-----------<  351      [04-28-22 @ 07:05]              33.1     135  |  95  |  58  ----------------------------<  180      [04-28-22 @ 07:10]  4.9   |  21  |  8.09        Ca     9.0     [04-28-22 @ 07:10]      Mg     2.7     [04-28-22 @ 07:10]      Phos  6.0     [04-28-22 @ 07:10]            Creatinine Trend:  SCr 8.09 [04-28 @ 07:10]  SCr 6.81 [04-27 @ 07:02]  SCr 9.03 [04-26 @ 05:49]  SCr 8.06 [04-25 @ 07:11]  SCr 5.91 [04-24 @ 07:13]              Urinalysis - [04-03-22 @ 00:48]      Color Light Orange / Appearance Turbid / SG 1.021 / pH 5.5      Gluc Negative / Ketone Negative  / Bili Negative / Urobili Negative       Blood Large / Protein 300 mg/dL / Leuk Est Large / Nitrite Negative      RBC 20 /  / Hyaline 10 / Gran  / Sq Epi  / Non Sq Epi 3 / Bacteria Negative      Iron 16, TIBC 239, %sat 7      [04-15-22 @ 10:16]  Ferritin 172      [04-15-22 @ 09:05]  PTH -- (Ca 8.3)      [04-15-22 @ 12:18]   150  PTH -- (Ca --)      [04-03-22 @ 23:06]   97  HbA1c 11.7      [11-07-19 @ 09:14]  TSH 1.98      [04-05-22 @ 05:19]    DEREJE: titer Negative, pattern Negative      [04-11-22 @ 16:24]  C3 Complement 61      [04-11-22 @ 16:56]  C4 Complement 28      [04-11-22 @ 16:56]  ANCA: cANCA Negative, pANCA Negative, atypical ANCA Indeterminate Method interference due to DEREJE fluorescence      [04-11-22 @ 16:24]  anti-GBM 3      [04-11-22 @ 19:27]  Free Light Chains: kappa 16.92, lambda 12.63, ratio = 1.34      [04-11 @ 16:56]  Immunofixation Serum: No Monoclonal Band Identified    Reference Range: None Detected      [04-11-22 @ 16:56]  SPEP Interpretation: Normal Electrophoresis Pattern      [04-11-22 @ 16:56]

## 2022-04-28 NOTE — PROGRESS NOTE ADULT - ASSESSMENT
67 year old gentleman with PMH of CAD, NSTEMI s/p 3 stents in 2014 (stents to LAD, proximal and distal LCx), ischemic cardiomyopathy, HTN for 10 years, T2DM for 26 years c/b peripheral neuropathy, CVA, TIA, Afib on Pradaxa, chronic RLE wound, L carotid stenosis s/p endarterectomy (2014) just recently admitted  to the [facility] on 2/19/2022 with acute onset chest pain for one day found to have an NSTEMI, CAD, s/p PCI in setting of increased AF rates off bb for 3 days and resolved chest pain, his CKMB peaked and Echo noted with EF 60%,normal LV function, mild TR, mild KY. He was s/p [facility] with 85% proximal LAD s/p balloon angioplasty and LULU. He presented to [facility] ED with decreased urine output over the week. Mr. Stevens went to [facility] for hematuria and was started  on Nitrofurantoin. Pt is still taking Nitrofurantoin w/ 5 days left. He came to the ED due to worsening symptoms. Pt reports having a similar incident 4-5 years ago that resolved spontaneously. He reports increased leg edema Dyspnea worse on exertion. his baseline Serum creatinine is in the 2.0 to 2.3 mg/dl range. ANT with serum creatinine of 8.29 with bun 144 and k 5.5        1- ANT on ckd III   2- chf chronic   3- hyperphosphatemia /shpt   4- anemia   5- hyperlipidemia   6- HTN /a fib  7- TIA hx       HD   F200, 225  min, , , 2L fluid removal  see HD flowsheet  renvela 2 tab with meals   anemia - epogen 93105 Units TIW with HD.  PT

## 2022-04-28 NOTE — PROGRESS NOTE ADULT - SUBJECTIVE AND OBJECTIVE BOX
Patient is a 67y old  Male who presents with a chief complaint of Decreased urine output for the past week, leg oedema (28 Apr 2022 12:36)    Coverage for Dr. Mendoza Corral   SUBJECTIVE / OVERNIGHT EVENTS: Comfortable Son at bedside.  Review of Systems  chest pain no  palpitations no  sob no  nausea no  headache no    MEDICATIONS  (STANDING):  allopurinol 100 milliGRAM(s) Oral daily  apixaban 5 milliGRAM(s) Oral two times a day  aspirin enteric coated 81 milliGRAM(s) Oral daily  atorvastatin 40 milliGRAM(s) Oral at bedtime  chlorhexidine 2% Cloths 1 Application(s) Topical <User Schedule>  chlorhexidine 4% Liquid 1 Application(s) Topical <User Schedule>  dextrose 50% Injectable 25 Gram(s) IV Push once  dextrose 50% Injectable 12.5 Gram(s) IV Push once  dextrose 50% Injectable 25 Gram(s) IV Push once  diltiazem    milliGRAM(s) Oral daily  epoetin naz-epbx (RETACRIT) Injectable 00493 Unit(s) IV Push once  hydrALAZINE 25 milliGRAM(s) Oral three times a day  insulin lispro (ADMELOG) corrective regimen sliding scale   SubCutaneous at bedtime  insulin lispro (ADMELOG) corrective regimen sliding scale   SubCutaneous three times a day before meals  lidocaine   4% Patch 1 Patch Transdermal daily  metoprolol succinate  milliGRAM(s) Oral daily  polyethylene glycol 3350 17 Gram(s) Oral two times a day  povidone iodine 10% Solution 1 Application(s) Topical daily  senna 2 Tablet(s) Oral at bedtime  sevelamer carbonate 1600 milliGRAM(s) Oral three times a day with meals    MEDICATIONS  (PRN):  bisacodyl 5 milliGRAM(s) Oral every 12 hours PRN Constipation  dextrose Oral Gel 15 Gram(s) Oral once PRN Blood Glucose LESS THAN 70 milliGRAM(s)/deciliter  sodium chloride 0.9% lock flush 10 milliLiter(s) IV Push every 1 hour PRN Pre/post blood products, medications, blood draw, and to maintain line patency      Vital Signs Last 24 Hrs  T(C): 36.7 (28 Apr 2022 12:34), Max: 36.7 (27 Apr 2022 21:14)  T(F): 98 (28 Apr 2022 12:34), Max: 98 (27 Apr 2022 21:14)  HR: 70 (28 Apr 2022 12:34) (70 - 85)  BP: 131/66 (28 Apr 2022 12:34) (131/66 - 147/67)  BP(mean): --  RR: 18 (28 Apr 2022 12:34) (18 - 18)  SpO2: 99% (28 Apr 2022 12:34) (97% - 99%)    PHYSICAL EXAM:  GENERAL: NAD, well-developed  HEAD:  Atraumatic, Normocephalic  EYES: EOMI, PERRLA, conjunctiva and sclera clear  NECK: Supple, No JVD  CHEST/LUNG: Clear to auscultation bilaterally; No wheeze  HEART: Regular rate and rhythm; No murmurs, rubs, or gallops  ABDOMEN: Soft, Nontender, Nondistended; Bowel sounds present  EXTREMITIES:  2+ bipedal edema  PSYCH: AAOx3  NEUROLOGY: non-focal  SKIN: No rashes or lesions    LABS:                        9.7    10.33 )-----------( 351      ( 28 Apr 2022 07:05 )             33.1     04-28    135  |  95<L>  |  58<H>  ----------------------------<  180<H>  4.9   |  21<L>  |  8.09<H>    Ca    9.0      28 Apr 2022 07:10  Phos  6.0     04-28  Mg     2.7     04-28                  RADIOLOGY & ADDITIONAL TESTS:    Imaging Personally Reviewed:    Consultant(s) Notes Reviewed:      Care Discussed with Consultants/Other Providers:

## 2022-04-28 NOTE — PROGRESS NOTE ADULT - SUBJECTIVE AND OBJECTIVE BOX
CARDIOLOGY FOLLOW UP - Dr. Mazariegos  DATE OF SERVICE: 4/8/22     CC no cp or sob   more awake today , son at bedside         REVIEW OF SYSTEMS:  CONSTITUTIONAL: No fever, weight loss, or fatigue  RESPIRATORY: No cough, wheezing, chills or hemoptysis; No Shortness of Breath  CARDIOVASCULAR: No chest pain, palpitations, passing out, dizziness, or leg swelling  GASTROINTESTINAL: No abdominal or epigastric pain. No nausea, vomiting, or hematemesis; No diarrhea or constipation. No melena or hematochezia.      PHYSICAL EXAM:  T(C): 36.7 (04-28-22 @ 12:34), Max: 36.7 (04-27-22 @ 21:14)  HR: 70 (04-28-22 @ 12:34) (70 - 85)  BP: 131/66 (04-28-22 @ 12:34) (131/66 - 147/67)  RR: 18 (04-28-22 @ 12:34) (18 - 18)  SpO2: 99% (04-28-22 @ 12:34) (97% - 99%)  Wt(kg): --  I&O's Summary      Appearance: Normal	  Cardiovascular: Normal S1 S2, irreg   Respiratory:  diminished   Gastrointestinal:  Soft, Non-tender, + BS	  Extremities: le edema       Home Medications:  allopurinol 100 mg oral tablet: 1 tab(s) orally once a day (03 Apr 2022 06:34)  aspirin 81 mg oral delayed release tablet: 1 tab(s) orally once a day (03 Apr 2022 06:34)  bumetanide 2 mg oral tablet: 1 tab(s) orally once a day (03 Apr 2022 06:34)  insulin glargine 100 units/mL subcutaneous solution: 25 unit(s) subcutaneous once a day (at bedtime) (03 Apr 2022 06:34)  metOLazone 5 mg oral tablet: 1 tab(s) orally 3 times a week (03 Apr 2022 06:34)  metoprolol succinate 50 mg oral tablet, extended release: 2 tab(s) orally once a day (03 Apr 2022 06:34)  NovoLOG 100 units/mL subcutaneous solution: subcutaneous 4 times a day (before meals and at bedtime) (03 Apr 2022 06:34)  NovoLOG FlexPen 100 units/mL injectable solution: 10 unit(s) subcutaneous 3 times a day (03 Apr 2022 06:34)  oxycodone-acetaminophen 5 mg-325 mg oral tablet: 1 tab(s) orally 2 times a day (03 Apr 2022 06:34)  Pradaxa 150 mg oral capsule: 1 cap(s) orally 2 times a day (03 Apr 2022 06:34)  pregabalin 100 mg oral capsule: 1 cap(s) orally 3 times a day (03 Apr 2022 06:34)      MEDICATIONS  (STANDING):  allopurinol 100 milliGRAM(s) Oral daily  apixaban 5 milliGRAM(s) Oral two times a day  aspirin enteric coated 81 milliGRAM(s) Oral daily  atorvastatin 40 milliGRAM(s) Oral at bedtime  chlorhexidine 2% Cloths 1 Application(s) Topical <User Schedule>  chlorhexidine 4% Liquid 1 Application(s) Topical <User Schedule>  dextrose 50% Injectable 25 Gram(s) IV Push once  dextrose 50% Injectable 25 Gram(s) IV Push once  dextrose 50% Injectable 12.5 Gram(s) IV Push once  diltiazem    milliGRAM(s) Oral daily  epoetin naz-epbx (RETACRIT) Injectable 83331 Unit(s) IV Push once  hydrALAZINE 25 milliGRAM(s) Oral three times a day  insulin lispro (ADMELOG) corrective regimen sliding scale   SubCutaneous at bedtime  insulin lispro (ADMELOG) corrective regimen sliding scale   SubCutaneous three times a day before meals  lidocaine   4% Patch 1 Patch Transdermal daily  metoprolol succinate  milliGRAM(s) Oral daily  polyethylene glycol 3350 17 Gram(s) Oral two times a day  povidone iodine 10% Solution 1 Application(s) Topical daily  senna 2 Tablet(s) Oral at bedtime  sevelamer carbonate 1600 milliGRAM(s) Oral three times a day with meals      TELEMETRY: afib hr 90-100s 	    ECG:  	  RADIOLOGY:   < from: CT Head No Cont (04.27.22 @ 12:33) >  IMPRESSION:    No hydrocephalus, acute intracranial hemorrhage, mass effect, or brain   edema.    --- End of Report ---      < end of copied text >    DIAGNOSTIC TESTING:  [ ] Echocardiogram:  [ ]  Catheterization:  [ ] Stress Test:    OTHER: 	    LABS:	 	                            9.7    10.33 )-----------( 351      ( 28 Apr 2022 07:05 )             33.1     04-28    135  |  95<L>  |  58<H>  ----------------------------<  180<H>  4.9   |  21<L>  |  8.09<H>    Ca    9.0      28 Apr 2022 07:10  Phos  6.0     04-28  Mg     2.7     04-28               CARDIOLOGY FOLLOW UP - Dr. Mazariegos  DATE OF SERVICE: 4/28/22     CC no cp or sob   more awake today , son at bedside         REVIEW OF SYSTEMS:  CONSTITUTIONAL: No fever, weight loss, or fatigue  RESPIRATORY: No cough, wheezing, chills or hemoptysis; No Shortness of Breath  CARDIOVASCULAR: No chest pain, palpitations, passing out, dizziness, or leg swelling  GASTROINTESTINAL: No abdominal or epigastric pain. No nausea, vomiting, or hematemesis; No diarrhea or constipation. No melena or hematochezia.      PHYSICAL EXAM:  T(C): 36.7 (04-28-22 @ 12:34), Max: 36.7 (04-27-22 @ 21:14)  HR: 70 (04-28-22 @ 12:34) (70 - 85)  BP: 131/66 (04-28-22 @ 12:34) (131/66 - 147/67)  RR: 18 (04-28-22 @ 12:34) (18 - 18)  SpO2: 99% (04-28-22 @ 12:34) (97% - 99%)  Wt(kg): --  I&O's Summary      Appearance: Normal	  Cardiovascular: Normal S1 S2, irreg   Respiratory:  diminished   Gastrointestinal:  Soft, Non-tender, + BS	  Extremities: le edema       Home Medications:  allopurinol 100 mg oral tablet: 1 tab(s) orally once a day (03 Apr 2022 06:34)  aspirin 81 mg oral delayed release tablet: 1 tab(s) orally once a day (03 Apr 2022 06:34)  bumetanide 2 mg oral tablet: 1 tab(s) orally once a day (03 Apr 2022 06:34)  insulin glargine 100 units/mL subcutaneous solution: 25 unit(s) subcutaneous once a day (at bedtime) (03 Apr 2022 06:34)  metOLazone 5 mg oral tablet: 1 tab(s) orally 3 times a week (03 Apr 2022 06:34)  metoprolol succinate 50 mg oral tablet, extended release: 2 tab(s) orally once a day (03 Apr 2022 06:34)  NovoLOG 100 units/mL subcutaneous solution: subcutaneous 4 times a day (before meals and at bedtime) (03 Apr 2022 06:34)  NovoLOG FlexPen 100 units/mL injectable solution: 10 unit(s) subcutaneous 3 times a day (03 Apr 2022 06:34)  oxycodone-acetaminophen 5 mg-325 mg oral tablet: 1 tab(s) orally 2 times a day (03 Apr 2022 06:34)  Pradaxa 150 mg oral capsule: 1 cap(s) orally 2 times a day (03 Apr 2022 06:34)  pregabalin 100 mg oral capsule: 1 cap(s) orally 3 times a day (03 Apr 2022 06:34)      MEDICATIONS  (STANDING):  allopurinol 100 milliGRAM(s) Oral daily  apixaban 5 milliGRAM(s) Oral two times a day  aspirin enteric coated 81 milliGRAM(s) Oral daily  atorvastatin 40 milliGRAM(s) Oral at bedtime  chlorhexidine 2% Cloths 1 Application(s) Topical <User Schedule>  chlorhexidine 4% Liquid 1 Application(s) Topical <User Schedule>  dextrose 50% Injectable 25 Gram(s) IV Push once  dextrose 50% Injectable 25 Gram(s) IV Push once  dextrose 50% Injectable 12.5 Gram(s) IV Push once  diltiazem    milliGRAM(s) Oral daily  epoetin naz-epbx (RETACRIT) Injectable 94415 Unit(s) IV Push once  hydrALAZINE 25 milliGRAM(s) Oral three times a day  insulin lispro (ADMELOG) corrective regimen sliding scale   SubCutaneous at bedtime  insulin lispro (ADMELOG) corrective regimen sliding scale   SubCutaneous three times a day before meals  lidocaine   4% Patch 1 Patch Transdermal daily  metoprolol succinate  milliGRAM(s) Oral daily  polyethylene glycol 3350 17 Gram(s) Oral two times a day  povidone iodine 10% Solution 1 Application(s) Topical daily  senna 2 Tablet(s) Oral at bedtime  sevelamer carbonate 1600 milliGRAM(s) Oral three times a day with meals      TELEMETRY: afib hr 90-100s 	    ECG:  	  RADIOLOGY:   < from: CT Head No Cont (04.27.22 @ 12:33) >  IMPRESSION:    No hydrocephalus, acute intracranial hemorrhage, mass effect, or brain   edema.    --- End of Report ---      < end of copied text >    DIAGNOSTIC TESTING:  [ ] Echocardiogram:  [ ]  Catheterization:  [ ] Stress Test:    OTHER: 	    LABS:	 	                            9.7    10.33 )-----------( 351      ( 28 Apr 2022 07:05 )             33.1     04-28    135  |  95<L>  |  58<H>  ----------------------------<  180<H>  4.9   |  21<L>  |  8.09<H>    Ca    9.0      28 Apr 2022 07:10  Phos  6.0     04-28  Mg     2.7     04-28

## 2022-04-28 NOTE — PROGRESS NOTE ADULT - SUBJECTIVE AND OBJECTIVE BOX
Follow-up Pulm Progress Note    No new respiratory events overnight.  Denies SOB/CP.   Sats 98% RA    Medications:  MEDICATIONS  (STANDING):  allopurinol 100 milliGRAM(s) Oral daily  apixaban 5 milliGRAM(s) Oral two times a day  aspirin enteric coated 81 milliGRAM(s) Oral daily  atorvastatin 40 milliGRAM(s) Oral at bedtime  chlorhexidine 2% Cloths 1 Application(s) Topical <User Schedule>  chlorhexidine 4% Liquid 1 Application(s) Topical <User Schedule>  dextrose 50% Injectable 25 Gram(s) IV Push once  dextrose 50% Injectable 12.5 Gram(s) IV Push once  dextrose 50% Injectable 25 Gram(s) IV Push once  diltiazem    milliGRAM(s) Oral daily  epoetin naz-epbx (RETACRIT) Injectable 46749 Unit(s) IV Push once  hydrALAZINE 25 milliGRAM(s) Oral three times a day  insulin lispro (ADMELOG) corrective regimen sliding scale   SubCutaneous three times a day before meals  insulin lispro (ADMELOG) corrective regimen sliding scale   SubCutaneous at bedtime  lidocaine   4% Patch 1 Patch Transdermal daily  metoprolol succinate  milliGRAM(s) Oral daily  polyethylene glycol 3350 17 Gram(s) Oral two times a day  povidone iodine 10% Solution 1 Application(s) Topical daily  senna 2 Tablet(s) Oral at bedtime  sevelamer carbonate 1600 milliGRAM(s) Oral three times a day with meals    MEDICATIONS  (PRN):  bisacodyl 5 milliGRAM(s) Oral every 12 hours PRN Constipation  dextrose Oral Gel 15 Gram(s) Oral once PRN Blood Glucose LESS THAN 70 milliGRAM(s)/deciliter  sodium chloride 0.9% lock flush 10 milliLiter(s) IV Push every 1 hour PRN Pre/post blood products, medications, blood draw, and to maintain line patency          Vital Signs Last 24 Hrs  T(C): 36.4 (28 Apr 2022 04:05), Max: 36.7 (27 Apr 2022 21:14)  T(F): 97.5 (28 Apr 2022 04:05), Max: 98 (27 Apr 2022 21:14)  HR: 85 (28 Apr 2022 04:05) (85 - 85)  BP: 147/67 (28 Apr 2022 04:05) (146/65 - 147/67)  BP(mean): --  RR: 18 (28 Apr 2022 04:05) (18 - 18)  SpO2: 98% (28 Apr 2022 04:05) (97% - 98%)              LABS:                        9.7    10.33 )-----------( 351      ( 28 Apr 2022 07:05 )             33.1     04-28    135  |  95<L>  |  58<H>  ----------------------------<  180<H>  4.9   |  21<L>  |  8.09<H>    Ca    9.0      28 Apr 2022 07:10  Phos  6.0     04-28  Mg     2.7     04-28            CAPILLARY BLOOD GLUCOSE      POCT Blood Glucose.: 196 mg/dL (28 Apr 2022 08:28)            DEREJE Negative 04-11 @ 16:24  Anti SS-1 --  Anti SS-2 --  Anti RNP --  RF -- 04-11 @ 16:24    Atypical ANCA Indeterminate Method interference due to DEREJE fluorescence 04-11 @ 16:24  c-ANCA titer -- 04-11 @ 16:24  c-ANCA Negative 04-11 @ 16:24  p-ANCA Negative 04-11 @ 16:24          Physical Examination:  PULM: Clear to auscultation bilaterally, no significant sputum production  CVS: S1, S2 heard      RADIOLOGY REVIEWED  CXR 4/11: unchanged b/l effusions, atelectasis    CT chest: < from: CT Chest No Cont (04.04.22 @ 16:52) >  FINDINGS:    AIRWAYS, LUNGS, PLEURA: Tracheal secretions. Small left greater than   right pleural effusions increased compared to 2/19/2022. Right-sided   pleural thickening. Lingular and left greater than right basilar   opacification, likely atelectasis.    MEDIASTINUM: Cardiomegaly. No pericardial effusion. Thoracic aorta normal   caliber.  No large mediastinal lymph nodes.    IMAGED ABDOMEN: Nonspecific perinephric stranding.    SOFT TISSUES: Unremarkable.    BONES: Unremarkable.      IMPRESSION:.    Small left greater than right bilateral pleural effusions increased in   size compared to 2/19/2022.    Lingular and left greater than right basilar opacification, likely   atelectasis.    --- End of Report ---    < end of copied text >

## 2022-04-28 NOTE — PROGRESS NOTE ADULT - ASSESSMENT
A/P    68 y/o M with history of CAD, s/p multiple PCI, with most recent 2/22 PCI of LAD in setting of NSTEMI, on ASA only (pradaxa), chronic atrial fibrillation maintained on Pradaxa, essential HTN, type 2 DM previously on oral medications but on insulin, diabetic neuropathy with chronic RIGHT LE venous stasis changes>left, LEFT foot ulcer seen by podiatry, past endarterectomy LEFT CEA in 2014 presenting with 1 week of decreased urine output, cystitis with hematuria     #ANT on CKD, new HD  -oliguric renal failure in setting of UTI/cystitis  -etiology ??  -New HD for uremia, renal failure  -sp rrt 4/12 for uncontrolled bleeding sp  right groin shiley removal  - monitor h/h - stable  -s/p L AVG 4/18  -s/p permacath placement 4/20  -renal f/ u    #AMS/Lethargy  -recent ams from pain meds  -AMS sp RRT 4/27  likely secondary to pain med   -repeat CT 4/27 unremarkable --  discontinued Percocet- improved today  -med f/u     #Acute on chronic HFpEF  -stable  -continue volume removal with HD  -c/w  bb    #Chronic AF  -rates overall stable   -c/w metoprolol succ 100 mg qd  -c/w dilt cd 120mg daily    -c/w eliquis 5 mg BID for now - heme stable     #HTN  -stable  -c/w meds   -increase hydral if bp remains elevated     #CAD, s/p PCI, most recent 2/2022  -stable, cont asa  -off plavix due to a/c indication  -statin     #R carotid stenosis  -cont med tx  -MRA noted with Greater than 75% stenosis of the right distal common carotid artery. Approximately 60% stenosis of the proximal left internal carotid artery.  -Continue ASA and Statin Therapy  -vasc outpt f/u Dr Pinto    plan discussed with family at bedside

## 2022-04-28 NOTE — PROGRESS NOTE ADULT - ASSESSMENT
·  Problem: Acute kidney injury superimposed on CKD. pt currently on hd  to cont as per renal  avf done luext   perm cath done     Problem/Plan - 2:  ·  Problem: Chronic atrial fibrillation. c/w a/c  cards f/u     Problem/Plan - 3:  ·  Problem: Cystitis.   treatment as per id  s/p abs     Problem/Plan - 4:  ·  Problem: Type 2 diabetes mellitus. c/w insulin   endo f/u     Problem/Plan - 5:  ·  Problem: CAD (coronary artery disease).   stable  cards f/u     constipation   bowel regimen      gout pain  control    rehab planning    discussed w patient and son at bedside    Javier Arce MD phone [phone]

## 2022-04-29 NOTE — PROGRESS NOTE ADULT - SUBJECTIVE AND OBJECTIVE BOX
[facility], Division of Infectious Diseases  ADRIANA Seals Y. Patel, S. Shah, [facility]  [phone]    Name: DYLAN DEL CASTILLO  Age: 67y  Gender: Male  MRN: [id number]    Interval History:  Patient seen and examined at bedside this afternoon  No acute overnight events. Lethargic, unable to provide any hx  Notes reviewed. Elevated WBC noted    Antibiotics:      Medications:  allopurinol 100 milliGRAM(s) Oral daily  apixaban 5 milliGRAM(s) Oral two times a day  aspirin enteric coated 81 milliGRAM(s) Oral daily  atorvastatin 40 milliGRAM(s) Oral at bedtime  bisacodyl 5 milliGRAM(s) Oral every 12 hours PRN  chlorhexidine 2% Cloths 1 Application(s) Topical <User Schedule>  chlorhexidine 4% Liquid 1 Application(s) Topical <User Schedule>  dextrose 50% Injectable 25 Gram(s) IV Push once  dextrose 50% Injectable 25 Gram(s) IV Push once  dextrose 50% Injectable 12.5 Gram(s) IV Push once  dextrose Oral Gel 15 Gram(s) Oral once PRN  diltiazem    milliGRAM(s) Oral daily  hydrALAZINE 25 milliGRAM(s) Oral three times a day  insulin lispro (ADMELOG) corrective regimen sliding scale   SubCutaneous at bedtime  insulin lispro (ADMELOG) corrective regimen sliding scale   SubCutaneous three times a day before meals  lidocaine   4% Patch 1 Patch Transdermal daily  metoprolol succinate  milliGRAM(s) Oral daily  polyethylene glycol 3350 17 Gram(s) Oral two times a day  povidone iodine 10% Solution 1 Application(s) Topical daily  senna 2 Tablet(s) Oral at bedtime  sevelamer carbonate 1600 milliGRAM(s) Oral three times a day with meals  sodium chloride 0.9% lock flush 10 milliLiter(s) IV Push every 1 hour PRN      Review of Systems:  unable to obtain    Allergies: nitrofurantoin (Nephrotoxicity)    For details regarding the patient's past medical history, social history, family history, and other miscellaneous elements, please refer the initial infectious diseases consultation and/or the admitting history and physical examination for this admission.    Objective:  Vitals:   T(C): 36.6 (04-29-22 @ 11:24), Max: 36.8 (04-29-22 @ 05:15)  HR: 92 (04-29-22 @ 11:24) (84 - 96)  BP: 122/61 (04-29-22 @ 11:24) (117/61 - 149/64)  RR: 18 (04-29-22 @ 11:24) (18 - 18)  SpO2: 99% (04-29-22 @ 11:24) (97% - 99%)    Physical Examination:  General: no acute distress  HEENT: NC/AT, anicteric, neck supple  Respiratory: no acc muscle use, breathing comfortably  Cardiovascular: S1 and S2 present  Gastrointestinal: normal appearing, nondistended  Extremities: no edema, no cyanosis, chronic venous stasis changes  Skin: no visible rash, right chest TDC      Laboratory Studies:  CBC:                       10.4   15.61 )-----------( 340      ( 29 Apr 2022 07:22 )             35.0     CMP: 04-29    134<L>  |  96  |  29<H>  ----------------------------<  164<H>  4.7   |  21<L>  |  5.35<H>    Ca    8.8      29 Apr 2022 07:23  Phos  6.0     04-28  Mg     2.7     04-28            Microbiology: reviewed      Radiology: reviewed

## 2022-04-29 NOTE — PROGRESS NOTE ADULT - SUBJECTIVE AND OBJECTIVE BOX
Follow-up Pulm Progress Note    No new respiratory events overnight.  Denies SOB/CP.     Medications:  MEDICATIONS  (STANDING):  allopurinol 100 milliGRAM(s) Oral daily  apixaban 5 milliGRAM(s) Oral two times a day  aspirin enteric coated 81 milliGRAM(s) Oral daily  atorvastatin 40 milliGRAM(s) Oral at bedtime  chlorhexidine 2% Cloths 1 Application(s) Topical <User Schedule>  chlorhexidine 4% Liquid 1 Application(s) Topical <User Schedule>  dextrose 50% Injectable 25 Gram(s) IV Push once  dextrose 50% Injectable 25 Gram(s) IV Push once  dextrose 50% Injectable 12.5 Gram(s) IV Push once  diltiazem    milliGRAM(s) Oral daily  hydrALAZINE 25 milliGRAM(s) Oral three times a day  insulin lispro (ADMELOG) corrective regimen sliding scale   SubCutaneous at bedtime  insulin lispro (ADMELOG) corrective regimen sliding scale   SubCutaneous three times a day before meals  lidocaine   4% Patch 1 Patch Transdermal daily  metoprolol succinate  milliGRAM(s) Oral daily  polyethylene glycol 3350 17 Gram(s) Oral two times a day  povidone iodine 10% Solution 1 Application(s) Topical daily  senna 2 Tablet(s) Oral at bedtime  sevelamer carbonate 1600 milliGRAM(s) Oral three times a day with meals    MEDICATIONS  (PRN):  bisacodyl 5 milliGRAM(s) Oral every 12 hours PRN Constipation  dextrose Oral Gel 15 Gram(s) Oral once PRN Blood Glucose LESS THAN 70 milliGRAM(s)/deciliter  sodium chloride 0.9% lock flush 10 milliLiter(s) IV Push every 1 hour PRN Pre/post blood products, medications, blood draw, and to maintain line patency          Vital Signs Last 24 Hrs  T(C): 36.6 (29 Apr 2022 11:24), Max: 36.8 (29 Apr 2022 05:15)  T(F): 97.8 (29 Apr 2022 11:24), Max: 98.2 (29 Apr 2022 05:15)  HR: 92 (29 Apr 2022 11:24) (84 - 96)  BP: 122/61 (29 Apr 2022 11:24) (117/61 - 149/64)  BP(mean): --  RR: 18 (29 Apr 2022 11:24) (18 - 18)  SpO2: 99% (29 Apr 2022 11:24) (97% - 99%)          04-28 @ 07:01  -  04-29 @ 07:00  --------------------------------------------------------  IN: 240 mL / OUT: 1500 mL / NET: -1260 mL          LABS:                        10.4   15.61 )-----------( 340      ( 29 Apr 2022 07:22 )             35.0     04-29    134<L>  |  96  |  29<H>  ----------------------------<  164<H>  4.7   |  21<L>  |  5.35<H>    Ca    8.8      29 Apr 2022 07:23  Phos  6.0     04-28  Mg     2.7     04-28            CAPILLARY BLOOD GLUCOSE      POCT Blood Glucose.: 227 mg/dL (29 Apr 2022 12:02)            DEREJE Negative 04-11 @ 16:24  Anti SS-1 --  Anti SS-2 --  Anti RNP --  RF -- 04-11 @ 16:24    Atypical ANCA Indeterminate Method interference due to DEREJE fluorescence 04-11 @ 16:24  c-ANCA titer -- 04-11 @ 16:24  c-ANCA Negative 04-11 @ 16:24  p-ANCA Negative 04-11 @ 16:24            Physical Examination:  PULM: Clear to auscultation bilaterally, no significant sputum production  CVS: S1, S2 heard      RADIOLOGY REVIEWED  CXR 4/11: unchanged b/l effusions, atelectasis    CT chest: < from: CT Chest No Cont (04.04.22 @ 16:52) >  FINDINGS:    AIRWAYS, LUNGS, PLEURA: Tracheal secretions. Small left greater than   right pleural effusions increased compared to 2/19/2022. Right-sided   pleural thickening. Lingular and left greater than right basilar   opacification, likely atelectasis.    MEDIASTINUM: Cardiomegaly. No pericardial effusion. Thoracic aorta normal   caliber.  No large mediastinal lymph nodes.    IMAGED ABDOMEN: Nonspecific perinephric stranding.    SOFT TISSUES: Unremarkable.    BONES: Unremarkable.      IMPRESSION:.    Small left greater than right bilateral pleural effusions increased in   size compared to 2/19/2022.    Lingular and left greater than right basilar opacification, likely   atelectasis.    --- End of Report ---    < end of copied text >

## 2022-04-29 NOTE — PROGRESS NOTE ADULT - SUBJECTIVE AND OBJECTIVE BOX
Patient is a 67y old  Male who presents with a chief complaint of Decreased urine output for the past week, leg oedema (29 Apr 2022 12:57)      DATE OF SERVICE: 04-29-22 @ 13:12    SUBJECTIVE / OVERNIGHT EVENTS: overnight events noted  alert but intermittently confused per son at bedside     ROS:  Resp: No cough no sputum production  CVS: No chest pain no palpitations no orthopnea  GI: no N/V/D  : no dysuria, no hematuria  per RN      MEDICATIONS  (STANDING):  allopurinol 100 milliGRAM(s) Oral daily  apixaban 5 milliGRAM(s) Oral two times a day  aspirin enteric coated 81 milliGRAM(s) Oral daily  atorvastatin 40 milliGRAM(s) Oral at bedtime  chlorhexidine 2% Cloths 1 Application(s) Topical <User Schedule>  chlorhexidine 4% Liquid 1 Application(s) Topical <User Schedule>  dextrose 50% Injectable 25 Gram(s) IV Push once  dextrose 50% Injectable 25 Gram(s) IV Push once  dextrose 50% Injectable 12.5 Gram(s) IV Push once  diltiazem    milliGRAM(s) Oral daily  hydrALAZINE 25 milliGRAM(s) Oral three times a day  insulin lispro (ADMELOG) corrective regimen sliding scale   SubCutaneous at bedtime  insulin lispro (ADMELOG) corrective regimen sliding scale   SubCutaneous three times a day before meals  lidocaine   4% Patch 1 Patch Transdermal daily  metoprolol succinate  milliGRAM(s) Oral daily  polyethylene glycol 3350 17 Gram(s) Oral two times a day  povidone iodine 10% Solution 1 Application(s) Topical daily  senna 2 Tablet(s) Oral at bedtime  sevelamer carbonate 1600 milliGRAM(s) Oral three times a day with meals    MEDICATIONS  (PRN):  bisacodyl 5 milliGRAM(s) Oral every 12 hours PRN Constipation  dextrose Oral Gel 15 Gram(s) Oral once PRN Blood Glucose LESS THAN 70 milliGRAM(s)/deciliter  sodium chloride 0.9% lock flush 10 milliLiter(s) IV Push every 1 hour PRN Pre/post blood products, medications, blood draw, and to maintain line patency        CAPILLARY BLOOD GLUCOSE      POCT Blood Glucose.: 227 mg/dL (29 Apr 2022 12:02)  POCT Blood Glucose.: 191 mg/dL (29 Apr 2022 08:09)  POCT Blood Glucose.: 151 mg/dL (28 Apr 2022 23:54)  POCT Blood Glucose.: 195 mg/dL (28 Apr 2022 17:09)    I&O's Summary    28 Apr 2022 07:01  -  29 Apr 2022 07:00  --------------------------------------------------------  IN: 240 mL / OUT: 1500 mL / NET: -1260 mL    29 Apr 2022 07:01  -  29 Apr 2022 13:12  --------------------------------------------------------  IN: 240 mL / OUT: 0 mL / NET: 240 mL        Vital Signs Last 24 Hrs  T(C): 36.6 (29 Apr 2022 11:24), Max: 36.8 (29 Apr 2022 05:15)  T(F): 97.8 (29 Apr 2022 11:24), Max: 98.2 (29 Apr 2022 05:15)  HR: 92 (29 Apr 2022 11:24) (84 - 96)  BP: 122/61 (29 Apr 2022 11:24) (117/61 - 149/64)  BP(mean): --  RR: 18 (29 Apr 2022 11:24) (18 - 18)  SpO2: 99% (29 Apr 2022 11:24) (97% - 99%)    PHYSICAL EXAM:  GENERAL: in no apparent distress  HEAD:  Atraumatic, Normocephalic  EYES: EOMI, PERRLA, sclera clear  NECK: Supple, No JVD  CHEST/LUNG: clear  HEART: S1 S2; no murmurs   ABDOMEN: Soft, Nontender  EXTREMITIES: + bilateral edema  NEUROLOGY: Alert confused  SKIN: No rashes or lesions    LABS:                        10.4   15.61 )-----------( 340      ( 29 Apr 2022 07:22 )             35.0     04-29    134<L>  |  96  |  29<H>  ----------------------------<  164<H>  4.7   |  21<L>  |  5.35<H>    Ca    8.8      29 Apr 2022 07:23  Phos  6.0     04-28  Mg     2.7     04-28                  All consultant(s) notes reviewed and care discussed with other providers        Contact Number, Dr King [phone]

## 2022-04-29 NOTE — PROGRESS NOTE ADULT - ASSESSMENT
Patient is a 67 year old male with PMH of CAD with past multiple PCI, with most recent discharge from [place] in Feb 2022 for NSTEMI with LULU of an 85% prox LAD lesion with patient on ASA and now off Plavix per cardiology (only for one month), chronic afib on Pradaxa, HTN, type 2 DM on insulin, diabetic neuropathy with chronic RIGHT LE venous stasis changes>left, LEFT foot ulcer seen by podiatry, past endarterectomy LEFT CEA in 2014 who presented with UTI with hematuria diagnosed at urgent care and now with decreased urine output.   Plan to take patient to OR Monday 4/18 for LUE AVF creation, possible graft placement.    Acute cystitis  Leukocytosis  Noted dark urine with hematuria, went to UC and was prescribed macrobid, had some improvement in urine color but then noticed decreased urine output with no voiding reported in last 2 days  UA here with pyuria -  with large LE, negative nitrites and no bacteria. UCx/BCx NGTD  Imaging: CTAP reviewed - no hydronephrosis, nephrolithiasis, or evidence of obstructive uropathy  S/p ceftriaxone x7 day course completed 4/9  C/w monitoring off Abx.   Trend temps/WBC--Leukocytosis noted to be elevated; pt has had fluctuations throughout hospital course. Not currently w/ s/s infection--c/w monitoring    ANT on CKD3  Nephrology following, appreciate recs  s/p placement of shiley and initiation of HD; s/p permcath placement 4/20  monitor Cr   renally dose meds   Avoid nephrotoxic agents    B/l LE edema with chronic venous stasis  Acute on chronic HFpEF  legs cool to touch, nontender, chronic changes with no sign of infection/cellulitis  has wound on bottom of L foot - dry and does not appear infected either  elevate lower extremities      DM2 with neuropathy  Blood glucose management per primary team.    D/c planning per primary team    Infectious Diseases will continue to follow. Please call with any questions.   Annie King M.D.  [facility], Division of Infectious Diseases [phone]     Patient is a 67 year old male with PMH of CAD with past multiple PCI, with most recent discharge from [place] in Feb 2022 for NSTEMI with LULU of an 85% prox LAD lesion with patient on ASA and now off Plavix per cardiology (only for one month), chronic afib on Pradaxa, HTN, type 2 DM on insulin, diabetic neuropathy with chronic RIGHT LE venous stasis changes>left, LEFT foot ulcer seen by podiatry, past endarterectomy LEFT CEA in 2014 who presented with UTI with hematuria diagnosed at urgent care and now with decreased urine output.   Plan to take patient to OR Monday 4/18 for LUE AVF creation, possible graft placement.    Acute cystitis  Leukocytosis  Noted dark urine with hematuria, went to UC and was prescribed macrobid, had some improvement in urine color but then noticed decreased urine output with no voiding reported in last 2 days  UA here with pyuria -  with large LE, negative nitrites and no bacteria. UCx/BCx NGTD  Imaging: CTAP reviewed - no hydronephrosis, nephrolithiasis, or evidence of obstructive uropathy  S/p ceftriaxone x7 day course completed 4/9  C/w monitoring off Abx.   Trend temps/WBC--Leukocytosis noted to be elevated; pt has had fluctuations throughout hospital course. Not currently w/ s/s infection--c/w monitoring    ANT on CKD3  Nephrology following, appreciate recs  s/p placement of shiley and initiation of HD; s/p permcath placement 4/20  monitor Cr   renally dose meds   Avoid nephrotoxic agents    B/l LE edema with chronic venous stasis  Acute on chronic HFpEF  legs cool to touch, nontender, chronic changes with no sign of infection/cellulitis  has wound on bottom of L foot - dry and does not appear infected either  elevate lower extremities      DM2 with neuropathy  Blood glucose management per primary team.    D/c planning per primary team    Dr. Gela Davalos covering weekend service 4/30-5/1  Dr. Eugene Meeks to resume service on Monday 5/2  Infectious Diseases will continue to follow. Please call with any questions.   Annie King M.D.  [facility], Division of Infectious Diseases [phone]

## 2022-04-29 NOTE — PROGRESS NOTE ADULT - ASSESSMENT
67 year old gentleman with PMH of CAD, NSTEMI s/p 3 stents in 2014 (stents to LAD, proximal and distal LCx), ischemic cardiomyopathy, HTN for 10 years, T2DM for 26 years c/b peripheral neuropathy, CVA, TIA, Afib on Pradaxa, chronic RLE wound, L carotid stenosis s/p endarterectomy (2014) just recently admitted  to the [facility] on 2/19/2022 with acute onset chest pain for one day found to have an NSTEMI, CAD, s/p PCI in setting of increased AF rates off bb for 3 days and resolved chest pain, his CKMB peaked and Echo noted with EF 60%,normal LV function, mild TR, mild OR. He was s/p [facility] with 85% proximal LAD s/p balloon angioplasty and LULU. He presented to [facility] ED with decreased urine output over the week. Mr. Stevens went to [facility] for hematuria and was started  on Nitrofurantoin. Pt is still taking Nitrofurantoin w/ 5 days left. He came to the ED due to worsening symptoms. Pt reports having a similar incident 4-5 years ago that resolved spontaneously. He reports increased leg edema Dyspnea worse on exertion. his baseline Serum creatinine is in the 2.0 to 2.3 mg/dl range. ANT with serum creatinine of 8.29 with bun 144 and k 5.5        1- ANT on ckd III   2- chf chronic   3- hyperphosphatemia /shpt   4- anemia   5- hyperlipidemia   6- HTN /a fib  7- TIA hx       HD  am   renvela 2 tab with meals   anemia - epogen 91504 Units TIW with HD.  PT

## 2022-04-29 NOTE — PROGRESS NOTE ADULT - PROBLEM SELECTOR PLAN 1
Likely 2nd to b/l pl effusions, atelectasis  -Suspect fluid overload given elevated proBNP, LE edema on admission   -Keep O>I with HD as tolerated   -Pt with hx of hydroPTX. CT chest in 2/2022 with pleural thickening, atelectasis. Findings at the time suspected to be chronic. CT A/P 4/2 with small b/l pl effusions L>R, pleural thickening  -CT chest now with small b/l pl effusion R>L, bibasilar atelectasis  -SOB resolved  -Keep sats >90% with supplemental O2 as needed (currently RA)  -D/c planning per primary team

## 2022-04-29 NOTE — PROGRESS NOTE ADULT - SUBJECTIVE AND OBJECTIVE BOX
CARDIOLOGY FOLLOW UP - Dr. Mazariegos  DATE OF SERVICE: 4/29/22     CC no cp or sob      REVIEW OF SYSTEMS:  CONSTITUTIONAL: No fever, weight loss, or fatigue  RESPIRATORY: No cough, wheezing, chills or hemoptysis; No Shortness of Breath  CARDIOVASCULAR: No chest pain, palpitations, passing out, dizziness, or leg swelling  GASTROINTESTINAL: No abdominal or epigastric pain. No nausea, vomiting, or hematemesis; No diarrhea or constipation. No melena or hematochezia.      PHYSICAL EXAM:  T(C): 36.6 (04-29-22 @ 11:24), Max: 36.8 (04-29-22 @ 05:15)  HR: 92 (04-29-22 @ 11:24) (84 - 96)  BP: 122/61 (04-29-22 @ 11:24) (117/61 - 149/64)  RR: 18 (04-29-22 @ 11:24) (18 - 18)  SpO2: 99% (04-29-22 @ 11:24) (97% - 99%)  Wt(kg): --  I&O's Summary    28 Apr 2022 07:01  -  29 Apr 2022 07:00  --------------------------------------------------------  IN: 240 mL / OUT: 1500 mL / NET: -1260 mL    29 Apr 2022 07:01  -  29 Apr 2022 12:57  --------------------------------------------------------  IN: 240 mL / OUT: 0 mL / NET: 240 mL        Appearance: Normal	  Cardiovascular: Normal S1 S2, irreg   Respiratory: diminsihed   Gastrointestinal:  Soft, Non-tender, + BS	  Extremities: bl le edema       Home Medications:  allopurinol 100 mg oral tablet: 1 tab(s) orally once a day (03 Apr 2022 06:34)  aspirin 81 mg oral delayed release tablet: 1 tab(s) orally once a day (03 Apr 2022 06:34)  bumetanide 2 mg oral tablet: 1 tab(s) orally once a day (03 Apr 2022 06:34)  insulin glargine 100 units/mL subcutaneous solution: 25 unit(s) subcutaneous once a day (at bedtime) (03 Apr 2022 06:34)  metOLazone 5 mg oral tablet: 1 tab(s) orally 3 times a week (03 Apr 2022 06:34)  metoprolol succinate 50 mg oral tablet, extended release: 2 tab(s) orally once a day (03 Apr 2022 06:34)  NovoLOG 100 units/mL subcutaneous solution: subcutaneous 4 times a day (before meals and at bedtime) (03 Apr 2022 06:34)  NovoLOG FlexPen 100 units/mL injectable solution: 10 unit(s) subcutaneous 3 times a day (03 Apr 2022 06:34)  oxycodone-acetaminophen 5 mg-325 mg oral tablet: 1 tab(s) orally 2 times a day (03 Apr 2022 06:34)  Pradaxa 150 mg oral capsule: 1 cap(s) orally 2 times a day (03 Apr 2022 06:34)  pregabalin 100 mg oral capsule: 1 cap(s) orally 3 times a day (03 Apr 2022 06:34)      MEDICATIONS  (STANDING):  allopurinol 100 milliGRAM(s) Oral daily  apixaban 5 milliGRAM(s) Oral two times a day  aspirin enteric coated 81 milliGRAM(s) Oral daily  atorvastatin 40 milliGRAM(s) Oral at bedtime  chlorhexidine 2% Cloths 1 Application(s) Topical <User Schedule>  chlorhexidine 4% Liquid 1 Application(s) Topical <User Schedule>  dextrose 50% Injectable 25 Gram(s) IV Push once  dextrose 50% Injectable 25 Gram(s) IV Push once  dextrose 50% Injectable 12.5 Gram(s) IV Push once  diltiazem    milliGRAM(s) Oral daily  hydrALAZINE 25 milliGRAM(s) Oral three times a day  insulin lispro (ADMELOG) corrective regimen sliding scale   SubCutaneous at bedtime  insulin lispro (ADMELOG) corrective regimen sliding scale   SubCutaneous three times a day before meals  lidocaine   4% Patch 1 Patch Transdermal daily  metoprolol succinate  milliGRAM(s) Oral daily  polyethylene glycol 3350 17 Gram(s) Oral two times a day  povidone iodine 10% Solution 1 Application(s) Topical daily  senna 2 Tablet(s) Oral at bedtime  sevelamer carbonate 1600 milliGRAM(s) Oral three times a day with meals      TELEMETRY:afib hr 80s  	    ECG:  	  RADIOLOGY:   DIAGNOSTIC TESTING:  [ ] Echocardiogram:  [ ]  Catheterization:  [ ] Stress Test:    OTHER: 	    LABS:	 	                            10.4   15.61 )-----------( 340      ( 29 Apr 2022 07:22 )             35.0     04-29    134<L>  |  96  |  29<H>  ----------------------------<  164<H>  4.7   |  21<L>  |  5.35<H>    Ca    8.8      29 Apr 2022 07:23  Phos  6.0     04-28  Mg     2.7     04-28

## 2022-04-29 NOTE — PROGRESS NOTE ADULT - SUBJECTIVE AND OBJECTIVE BOX
[facility]   [phone]  RENAL FOLLOW UP NOTE  --------------------------------------------------------------------------------  Chief Complaint:    24 hour events/subjective:    seen earlier. more responsive   son at bedside     PAST HISTORY  --------------------------------------------------------------------------------  No significant changes to PMH, PSH, FHx, SHx, unless otherwise noted    ALLERGIES & MEDICATIONS  --------------------------------------------------------------------------------  Allergies    nitrofurantoin (Nephrotoxicity)    Intolerances      Standing Inpatient Medications  allopurinol 100 milliGRAM(s) Oral daily  apixaban 5 milliGRAM(s) Oral two times a day  aspirin enteric coated 81 milliGRAM(s) Oral daily  atorvastatin 40 milliGRAM(s) Oral at bedtime  chlorhexidine 2% Cloths 1 Application(s) Topical <User Schedule>  chlorhexidine 4% Liquid 1 Application(s) Topical <User Schedule>  dextrose 50% Injectable 25 Gram(s) IV Push once  dextrose 50% Injectable 25 Gram(s) IV Push once  dextrose 50% Injectable 12.5 Gram(s) IV Push once  diltiazem    milliGRAM(s) Oral daily  hydrALAZINE 25 milliGRAM(s) Oral three times a day  insulin lispro (ADMELOG) corrective regimen sliding scale   SubCutaneous three times a day before meals  insulin lispro (ADMELOG) corrective regimen sliding scale   SubCutaneous at bedtime  lidocaine   4% Patch 1 Patch Transdermal daily  metoprolol succinate  milliGRAM(s) Oral daily  polyethylene glycol 3350 17 Gram(s) Oral two times a day  povidone iodine 10% Solution 1 Application(s) Topical daily  senna 2 Tablet(s) Oral at bedtime  sevelamer carbonate 1600 milliGRAM(s) Oral three times a day with meals    PRN Inpatient Medications  bisacodyl 5 milliGRAM(s) Oral every 12 hours PRN  dextrose Oral Gel 15 Gram(s) Oral once PRN  sodium chloride 0.9% lock flush 10 milliLiter(s) IV Push every 1 hour PRN      REVIEW OF SYSTEMS  --------------------------------------------------------------------------------    Gen: denies fevers/chills,  CVS: denies chest pain/palpitations  Resp: denies SOB/Cough  GI: Denies N/V/Abd pain  : Denies dysuria        VITALS/PHYSICAL EXAM  --------------------------------------------------------------------------------  T(C): 36.7 (04-29-22 @ 20:00), Max: 36.8 (04-29-22 @ 05:15)  HR: 84 (04-29-22 @ 20:00) (71 - 96)  BP: 130/60 (04-29-22 @ 20:00) (117/61 - 141/67)  RR: 18 (04-29-22 @ 20:00) (18 - 18)  SpO2: 97% (04-29-22 @ 20:00) (97% - 99%)  Wt(kg): --        04-28-22 @ 07:01  -  04-29-22 @ 07:00  --------------------------------------------------------  IN: 240 mL / OUT: 1500 mL / NET: -1260 mL    04-29-22 @ 07:01  -  04-29-22 @ 21:33  --------------------------------------------------------  IN: 630 mL / OUT: 0 mL / NET: 630 mL      Physical Exam:  	    Gen: Appears comfortable,   responsive   	Pulm: Decreased breath sounds b/l bases.  	CV: No JVD. IRR,   	Abd: +BS, soft, nontender, softly distended, obese               Extremity UE: increased warmth, R hand, increased warmth, swollen              Extremity LE: + hyperpigmented bilateral LE with B/L  no edema              Vascular: R IJ HD catheter, L arm AVF       LABS/STUDIES  --------------------------------------------------------------------------------              10.4   15.61 >-----------<  340      [04-29-22 @ 07:22]              35.0     134  |  96  |  29  ----------------------------<  164      [04-29-22 @ 07:23]  4.7   |  21  |  5.35        Ca     8.8     [04-29-22 @ 07:23]      Mg     2.7     [04-28-22 @ 07:10]      Phos  6.0     [04-28-22 @ 07:10]            Creatinine Trend:  SCr 5.35 [04-29 @ 07:23]  SCr 8.09 [04-28 @ 07:10]  SCr 6.81 [04-27 @ 07:02]  SCr 9.03 [04-26 @ 05:49]  SCr 8.06 [04-25 @ 07:11]              Urinalysis - [04-03-22 @ 00:48]      Color Light Orange / Appearance Turbid / SG 1.021 / pH 5.5      Gluc Negative / Ketone Negative  / Bili Negative / Urobili Negative       Blood Large / Protein 300 mg/dL / Leuk Est Large / Nitrite Negative      RBC 20 /  / Hyaline 10 / Gran  / Sq Epi  / Non Sq Epi 3 / Bacteria Negative      Iron 16, TIBC 239, %sat 7      [04-15-22 @ 10:16]  Ferritin 172      [04-15-22 @ 09:05]  PTH -- (Ca 8.3)      [04-15-22 @ 12:18]   150  PTH -- (Ca --)      [04-03-22 @ 23:06]   97  HbA1c 11.7      [11-07-19 @ 09:14]  TSH 1.98      [04-05-22 @ 05:19]    DEREJE: titer Negative, pattern Negative      [04-11-22 @ 16:24]  C3 Complement 61      [04-11-22 @ 16:56]  C4 Complement 28      [04-11-22 @ 16:56]  ANCA: cANCA Negative, pANCA Negative, atypical ANCA Indeterminate Method interference due to DEREJE fluorescence      [04-11-22 @ 16:24]  anti-GBM 3      [04-11-22 @ 19:27]  Free Light Chains: kappa 16.92, lambda 12.63, ratio = 1.34      [04-11 @ 16:56]  Immunofixation Serum: No Monoclonal Band Identified    Reference Range: None Detected      [04-11-22 @ 16:56]  SPEP Interpretation: Normal Electrophoresis Pattern      [04-11-22 @ 16:56]

## 2022-04-30 NOTE — PROGRESS NOTE ADULT - SUBJECTIVE AND OBJECTIVE BOX
[facility]   [phone]  RENAL FOLLOW UP NOTE  --------------------------------------------------------------------------------  Chief Complaint:    24 hour events/subjective:    seen earlier   responsive  pt wife at bedside     PAST HISTORY  --------------------------------------------------------------------------------  No significant changes to PMH, PSH, FHx, SHx, unless otherwise noted    ALLERGIES & MEDICATIONS  --------------------------------------------------------------------------------  Allergies    nitrofurantoin (Nephrotoxicity)    Intolerances      Standing Inpatient Medications  allopurinol 100 milliGRAM(s) Oral daily  apixaban 5 milliGRAM(s) Oral two times a day  aspirin enteric coated 81 milliGRAM(s) Oral daily  atorvastatin 40 milliGRAM(s) Oral at bedtime  chlorhexidine 2% Cloths 1 Application(s) Topical <User Schedule>  chlorhexidine 4% Liquid 1 Application(s) Topical <User Schedule>  dextrose 50% Injectable 25 Gram(s) IV Push once  dextrose 50% Injectable 25 Gram(s) IV Push once  dextrose 50% Injectable 12.5 Gram(s) IV Push once  diltiazem    milliGRAM(s) Oral daily  epoetin naz-epbx (RETACRIT) Injectable 85752 Unit(s) IV Push once  hydrALAZINE 25 milliGRAM(s) Oral three times a day  insulin lispro (ADMELOG) corrective regimen sliding scale   SubCutaneous at bedtime  insulin lispro (ADMELOG) corrective regimen sliding scale   SubCutaneous three times a day before meals  lidocaine   4% Patch 1 Patch Transdermal daily  metoprolol succinate  milliGRAM(s) Oral daily  polyethylene glycol 3350 17 Gram(s) Oral two times a day  povidone iodine 10% Solution 1 Application(s) Topical daily  senna 2 Tablet(s) Oral at bedtime  sevelamer carbonate 1600 milliGRAM(s) Oral three times a day with meals    PRN Inpatient Medications  bisacodyl 5 milliGRAM(s) Oral every 12 hours PRN  dextrose Oral Gel 15 Gram(s) Oral once PRN  sodium chloride 0.9% lock flush 10 milliLiter(s) IV Push every 1 hour PRN      REVIEW OF SYSTEMS  --------------------------------------------------------------------------------    Gen: denies fevers/chills,  CVS: denies chest pain/palpitations  Resp: denies SOB/Cough  GI: Denies N/V/Abd pain  : Denies dysuria        VITALS/PHYSICAL EXAM  --------------------------------------------------------------------------------  T(C): 36.5 (04-30-22 @ 13:20), Max: 37 (04-30-22 @ 09:32)  HR: 85 (04-30-22 @ 13:20) (84 - 92)  BP: 122/64 (04-30-22 @ 13:20) (122/64 - 148/78)  RR: 18 (04-30-22 @ 13:20) (18 - 18)  SpO2: 98% (04-30-22 @ 13:20) (97% - 98%)  Wt(kg): --        04-29-22 @ 07:01  -  04-30-22 @ 07:00  --------------------------------------------------------  IN: 630 mL / OUT: 0 mL / NET: 630 mL    04-30-22 @ 07:01  -  04-30-22 @ 16:50  --------------------------------------------------------  IN: 500 mL / OUT: 0 mL / NET: 500 mL      Physical Exam:  	      Gen: Appears comfortable,   responsive   	Pulm: Decreased breath sounds b/l bases.  	CV: No JVD. IRR,   	Abd: +BS, soft, nontender, softly distended, obese               Extremity UE: increased warmth, R hand, increased warmth, swollen              Extremity LE: + hyperpigmented bilateral LE with B/L  no edema              Vascular: R IJ HD catheter, L arm AVF       LABS/STUDIES  --------------------------------------------------------------------------------              9.0    12.06 >-----------<  338      [04-30-22 @ 07:06]              30.7     133  |  96  |  39  ----------------------------<  171      [04-30-22 @ 07:06]  4.9   |  21  |  6.92        Ca     8.8     [04-30-22 @ 07:06]            Creatinine Trend:  SCr 6.92 [04-30 @ 07:06]  SCr 5.35 [04-29 @ 07:23]  SCr 8.09 [04-28 @ 07:10]  SCr 6.81 [04-27 @ 07:02]  SCr 9.03 [04-26 @ 05:49]              Urinalysis - [04-03-22 @ 00:48]      Color Light Orange / Appearance Turbid / SG 1.021 / pH 5.5      Gluc Negative / Ketone Negative  / Bili Negative / Urobili Negative       Blood Large / Protein 300 mg/dL / Leuk Est Large / Nitrite Negative      RBC 20 /  / Hyaline 10 / Gran  / Sq Epi  / Non Sq Epi 3 / Bacteria Negative      Iron 16, TIBC 239, %sat 7      [04-15-22 @ 10:16]  Ferritin 172      [04-15-22 @ 09:05]  PTH -- (Ca 8.3)      [04-15-22 @ 12:18]   150  PTH -- (Ca --)      [04-03-22 @ 23:06]   97  HbA1c 11.7      [11-07-19 @ 09:14]  TSH 1.98      [04-05-22 @ 05:19]    DEREJE: titer Negative, pattern Negative      [04-11-22 @ 16:24]  C3 Complement 61      [04-11-22 @ 16:56]  C4 Complement 28      [04-11-22 @ 16:56]  ANCA: cANCA Negative, pANCA Negative, atypical ANCA Indeterminate Method interference due to DEREJE fluorescence      [04-11-22 @ 16:24]  anti-GBM 3      [04-11-22 @ 19:27]  Free Light Chains: kappa 16.92, lambda 12.63, ratio = 1.34      [04-11 @ 16:56]  Immunofixation Serum: No Monoclonal Band Identified    Reference Range: None Detected      [04-11-22 @ 16:56]  SPEP Interpretation: Normal Electrophoresis Pattern      [04-11-22 @ 16:56]

## 2022-04-30 NOTE — PROGRESS NOTE ADULT - ASSESSMENT
A/P    66 y/o M with history of CAD, s/p multiple PCI, with most recent 2/22 PCI of LAD in setting of NSTEMI, on ASA only (pradaxa), chronic atrial fibrillation maintained on Pradaxa, essential HTN, type 2 DM previously on oral medications but on insulin, diabetic neuropathy with chronic RIGHT LE venous stasis changes>left, LEFT foot ulcer seen by podiatry, past endarterectomy LEFT CEA in 2014 presenting with 1 week of decreased urine output, cystitis with hematuria     #ANT on CKD, new HD  -oliguric renal failure in setting of UTI/cystitis  -etiology ??  -New HD for uremia, renal failure  -sp rrt 4/12 for uncontrolled bleeding sp  right groin shiley removal  - monitor h/h - stable  -s/p L AVG 4/18  -s/p permacath placement 4/20  -renal f/ u    #AMS/Lethargy  -recent ams from pain meds  -AMS sp RRT 4/27  likely secondary to pain med   -repeat CT 4/27 unremarkable --  discontinued Percocet- improved today  -med f/u     #Acute on chronic HFpEF  -stable  -continue volume removal with HD  -c/w  bb    #Chronic AF  -rates overall stable   -c/w metoprolol succ 100 mg qd  -c/w dilt cd 120mg daily    -c/w eliquis 5 mg BID for now - heme stable     #HTN  -stable  -c/w meds   -increase hydral if bp remains elevated     #CAD, s/p PCI, most recent 2/2022  -stable, cont asa  -off plavix due to a/c indication  -statin     #R carotid stenosis  -cont med tx  -MRA noted with Greater than 75% stenosis of the right distal common carotid artery. Approximately 60% stenosis of the proximal left internal carotid artery.  -Continue ASA and Statin Therapy  -vasc outpt f/u Dr Pinto    plan discussed with wife at bedside

## 2022-04-30 NOTE — PROGRESS NOTE ADULT - ASSESSMENT
67 year old gentleman with PMH of CAD, NSTEMI s/p 3 stents in 2014 (stents to LAD, proximal and distal LCx), ischemic cardiomyopathy, HTN for 10 years, T2DM for 26 years c/b peripheral neuropathy, CVA, TIA, Afib on Pradaxa, chronic RLE wound, L carotid stenosis s/p endarterectomy (2014) just recently admitted  to the [facility] on 2/19/2022 with acute onset chest pain for one day found to have an NSTEMI, CAD, s/p PCI in setting of increased AF rates off bb for 3 days and resolved chest pain, his CKMB peaked and Echo noted with EF 60%,normal LV function, mild TR, mild TN. He was s/p [facility] with 85% proximal LAD s/p balloon angioplasty and LULU. He presented to [facility] ED with decreased urine output over the week. [facility]. Stevens went to [facility] for hematuria and was started  on Nitrofurantoin. Pt is still taking Nitrofurantoin w/ 5 days left. He came to the ED due to worsening symptoms. Pt reports having a similar incident 4-5 years ago that resolved spontaneously. He reports increased leg edema Dyspnea worse on exertion. his baseline Serum creatinine is in the 2.0 to 2.3 mg/dl range. ANT with serum creatinine of 8.29 with bun 144 and k 5.5        1- ANT on ckd III   2- chf chronic   3- hyperphosphatemia /shpt   4- anemia   5- hyperlipidemia   6- HTN /a fib  7- TIA hx       HD   F200, 240  min, , , 2L fluid removal  renvela 2 tab with meals   anemia - epogen 46855 Units TIW with HD.  PT

## 2022-04-30 NOTE — PROGRESS NOTE ADULT - SUBJECTIVE AND OBJECTIVE BOX
CARDIOLOGY FOLLOW UP - Dr. Mazariegos  Date of Service:   CC    Review of Systems:  Constitutional: No fever, weight loss, or fatigue  Respiratory: No cough, wheezing, or hemoptysis, no shortness of breath  Cardiovascular: No chest pain, palpitaitons, passing out, dizziness, or leg swelling  Gastrointestinal: No abd or epigastric pain. No nausea, vomitting, or hematemesis; no diarrhea or consiptaiton, no melena or hematochezia  Vascular: No edema     TELEMETRY:    PHYSICAL EXAM:  T(C): 37 (04-30-22 @ 04:32), Max: 37 (04-30-22 @ 04:32)  HR: 92 (04-30-22 @ 04:32) (71 - 92)  BP: 148/78 (04-30-22 @ 04:32) (122/61 - 148/78)  RR: 18 (04-30-22 @ 04:32) (18 - 18)  SpO2: 98% (04-30-22 @ 04:32) (97% - 99%)  Wt(kg): --  I&O's Summary    29 Apr 2022 07:01  -  30 Apr 2022 07:00  --------------------------------------------------------  IN: 630 mL / OUT: 0 mL / NET: 630 mL        Appearance: Normal	  Cardiovascular: Normal S1 S2,RRR, No JVD, No murmurs  Respiratory: Lungs clear to auscultation	  Gastrointestinal:  Soft, Non-tender, + BS	  Extremities: Normal range of motion, No clubbing, cyanosis or edema  Vascular: Peripheral pulses palpable 2+ bilaterally       Home Medications:  allopurinol 100 mg oral tablet: 1 tab(s) orally once a day (03 Apr 2022 06:34)  aspirin 81 mg oral delayed release tablet: 1 tab(s) orally once a day (03 Apr 2022 06:34)  bumetanide 2 mg oral tablet: 1 tab(s) orally once a day (03 Apr 2022 06:34)  insulin glargine 100 units/mL subcutaneous solution: 25 unit(s) subcutaneous once a day (at bedtime) (03 Apr 2022 06:34)  metOLazone 5 mg oral tablet: 1 tab(s) orally 3 times a week (03 Apr 2022 06:34)  metoprolol succinate 50 mg oral tablet, extended release: 2 tab(s) orally once a day (03 Apr 2022 06:34)  NovoLOG 100 units/mL subcutaneous solution: subcutaneous 4 times a day (before meals and at bedtime) (03 Apr 2022 06:34)  NovoLOG FlexPen 100 units/mL injectable solution: 10 unit(s) subcutaneous 3 times a day (03 Apr 2022 06:34)  oxycodone-acetaminophen 5 mg-325 mg oral tablet: 1 tab(s) orally 2 times a day (03 Apr 2022 06:34)  Pradaxa 150 mg oral capsule: 1 cap(s) orally 2 times a day (03 Apr 2022 06:34)  pregabalin 100 mg oral capsule: 1 cap(s) orally 3 times a day (03 Apr 2022 06:34)        MEDICATIONS  (STANDING):  allopurinol 100 milliGRAM(s) Oral daily  apixaban 5 milliGRAM(s) Oral two times a day  aspirin enteric coated 81 milliGRAM(s) Oral daily  atorvastatin 40 milliGRAM(s) Oral at bedtime  chlorhexidine 2% Cloths 1 Application(s) Topical <User Schedule>  chlorhexidine 4% Liquid 1 Application(s) Topical <User Schedule>  dextrose 50% Injectable 25 Gram(s) IV Push once  dextrose 50% Injectable 25 Gram(s) IV Push once  dextrose 50% Injectable 12.5 Gram(s) IV Push once  diltiazem    milliGRAM(s) Oral daily  epoetin naz-epbx (RETACRIT) Injectable 29077 Unit(s) IV Push once  hydrALAZINE 25 milliGRAM(s) Oral three times a day  insulin lispro (ADMELOG) corrective regimen sliding scale   SubCutaneous at bedtime  insulin lispro (ADMELOG) corrective regimen sliding scale   SubCutaneous three times a day before meals  lidocaine   4% Patch 1 Patch Transdermal daily  metoprolol succinate  milliGRAM(s) Oral daily  polyethylene glycol 3350 17 Gram(s) Oral two times a day  povidone iodine 10% Solution 1 Application(s) Topical daily  senna 2 Tablet(s) Oral at bedtime  sevelamer carbonate 1600 milliGRAM(s) Oral three times a day with meals        EKG:  RADIOLOGY:  DIAGNOSTIC TESTING:  [ ] Echocardiogram:  [ ] Catherterization:  [ ] Stress Test:  OTHER:     LABS:	 	                          9.0    12.06 )-----------( 338      ( 30 Apr 2022 07:06 )             30.7     04-30    133<L>  |  96  |  39<H>  ----------------------------<  171<H>  4.9   |  21<L>  |  6.92<H>    Ca    8.8      30 Apr 2022 07:06            CARDIAC MARKERS:

## 2022-04-30 NOTE — PROGRESS NOTE ADULT - ASSESSMENT
·  Problem: Acute kidney injury superimposed on CKD. pt currently on hd  to cont as per renal  avf done luext   perm cath done     Problem/Plan - 2:  ·  Problem: Chronic atrial fibrillation. c/w a/c  cards f/u     Problem/Plan - 3:  ·  Problem: Cystitis.   treatment as per id  bump in WBC  ID follow up appreciated   cultures testing    CXR no pneumonia      Problem/Plan - 4:  ·  Problem: Type 2 diabetes mellitus. c/w insulin   endo f/u     Problem/Plan - 5:  ·  Problem: CAD (coronary artery disease).   stable  cards f/u     constipation   bowel regimen      gout pain  control    rehab planning    discussed with son over the phone in detail (Terr[place])         ·  Problem: Acute kidney injury superimposed on CKD. pt currently on hd  to cont as per renal  avf done luext   perm cath done     Problem/Plan - 2:  ·  Problem: Chronic atrial fibrillation. c/w a/c  cards f/u     Problem/Plan - 3:  ·  Problem: Cystitis.   treatment as per id  bump in WBC  ID follow up appreciated   cultures testing    CXR no pneumonia      Problem/Plan - 4:  ·  Problem: Type 2 diabetes mellitus. c/w insulin   endo f/u     Problem/Plan - 5:  ·  Problem: CAD (coronary artery disease).   stable  cards f/u     constipation   bowel regimen      gout pain  control    rehab planning    discussed with patient's wife at bedside in detail

## 2022-04-30 NOTE — PROGRESS NOTE ADULT - SUBJECTIVE AND OBJECTIVE BOX
Patient is a 67y old  Male who presents with a chief complaint of Decreased urine output for the past week, leg oedema (29 Apr 2022 21:32)      DATE OF SERVICE: 04-30-22 @ 09:35    SUBJECTIVE / OVERNIGHT EVENTS: overnight events noted  wife at bedside     ROS:  Resp: No cough no sputum production  CVS: No chest pain no palpitations no orthopnea  GI: no N/V/D  : no dysuria, no hematuria  Neuro: no weakness no paresthesias          MEDICATIONS  (STANDING):  allopurinol 100 milliGRAM(s) Oral daily  apixaban 5 milliGRAM(s) Oral two times a day  aspirin enteric coated 81 milliGRAM(s) Oral daily  atorvastatin 40 milliGRAM(s) Oral at bedtime  chlorhexidine 2% Cloths 1 Application(s) Topical <User Schedule>  chlorhexidine 4% Liquid 1 Application(s) Topical <User Schedule>  dextrose 50% Injectable 25 Gram(s) IV Push once  dextrose 50% Injectable 25 Gram(s) IV Push once  dextrose 50% Injectable 12.5 Gram(s) IV Push once  diltiazem    milliGRAM(s) Oral daily  epoetin naz-epbx (RETACRIT) Injectable 28905 Unit(s) IV Push once  hydrALAZINE 25 milliGRAM(s) Oral three times a day  insulin lispro (ADMELOG) corrective regimen sliding scale   SubCutaneous three times a day before meals  insulin lispro (ADMELOG) corrective regimen sliding scale   SubCutaneous at bedtime  lidocaine   4% Patch 1 Patch Transdermal daily  metoprolol succinate  milliGRAM(s) Oral daily  polyethylene glycol 3350 17 Gram(s) Oral two times a day  povidone iodine 10% Solution 1 Application(s) Topical daily  senna 2 Tablet(s) Oral at bedtime  sevelamer carbonate 1600 milliGRAM(s) Oral three times a day with meals    MEDICATIONS  (PRN):  bisacodyl 5 milliGRAM(s) Oral every 12 hours PRN Constipation  dextrose Oral Gel 15 Gram(s) Oral once PRN Blood Glucose LESS THAN 70 milliGRAM(s)/deciliter  sodium chloride 0.9% lock flush 10 milliLiter(s) IV Push every 1 hour PRN Pre/post blood products, medications, blood draw, and to maintain line patency        CAPILLARY BLOOD GLUCOSE      POCT Blood Glucose.: 181 mg/dL (30 Apr 2022 08:24)  POCT Blood Glucose.: 208 mg/dL (29 Apr 2022 21:06)  POCT Blood Glucose.: 208 mg/dL (29 Apr 2022 17:00)  POCT Blood Glucose.: 227 mg/dL (29 Apr 2022 12:02)    I&O's Summary    29 Apr 2022 07:01  -  30 Apr 2022 07:00  --------------------------------------------------------  IN: 630 mL / OUT: 0 mL / NET: 630 mL        Vital Signs Last 24 Hrs  T(C): 37 (30 Apr 2022 04:32), Max: 37 (30 Apr 2022 04:32)  T(F): 98.6 (30 Apr 2022 04:32), Max: 98.6 (30 Apr 2022 04:32)  HR: 92 (30 Apr 2022 04:32) (71 - 92)  BP: 148/78 (30 Apr 2022 04:32) (122/61 - 148/78)  BP(mean): --  RR: 18 (30 Apr 2022 04:32) (18 - 18)  SpO2: 98% (30 Apr 2022 04:32) (97% - 99%)     Apr 2022 11:24) (97% - 99%)    PHYSICAL EXAM:  GENERAL: in no apparent distress  EYES: EOMI, PERRLA,  NECK: Supple, No JVD  CHEST/LUNG: clear  HEART: S1 S2; no murmurs   ABDOMEN: Soft, Nontender  EXTREMITIES: + bilateral edema  NEUROLOGY: Alert appears less confused  SKIN: No rashes or lesions    LABS:                        9.0    12.06 )-----------( 338      ( 30 Apr 2022 07:06 )             30.7     04-30    133<L>  |  96  |  39<H>  ----------------------------<  171<H>  4.9   |  21<L>  |  6.92<H>    Ca    8.8      30 Apr 2022 07:06                  All consultant(s) notes reviewed and care discussed with other providers        Contact Number, Dr King [phone]

## 2022-05-01 NOTE — PROGRESS NOTE ADULT - SUBJECTIVE AND OBJECTIVE BOX
[facility], Division of Infectious Diseases  ADRIANA Seals Y. Patel, S. Shah, G. Constantino  [phone]  after hours and weekends [phone]    Name: DYLAN DEL CASTILLO  Age: 67y  Gender: Male  MRN: [id number]    Interval History--  Notes reviewed  arousable but very sleepy  states had abd cramping and 2 stool now feels better  family at bedside      Allergies    nitrofurantoin (Nephrotoxicity)    Intolerances        Medications--  Antibiotics:    Immunologic:    Other:  allopurinol  apixaban  aspirin enteric coated  atorvastatin  bisacodyl PRN  chlorhexidine 2% Cloths  chlorhexidine 4% Liquid  dextrose 50% Injectable  dextrose 50% Injectable  dextrose 50% Injectable  dextrose Oral Gel PRN  diltiazem   CD  hydrALAZINE  insulin lispro (ADMELOG) corrective regimen sliding scale  insulin lispro (ADMELOG) corrective regimen sliding scale  lidocaine   4% Patch  metoprolol succinate ER  polyethylene glycol 3350  povidone iodine 10% Solution  senna  sevelamer carbonate  sodium chloride 0.9% lock flush PRN      Review of Systems--  A 10-point review of systems was obtained.     unchanged     Review of systems otherwise negative except as previously noted.    Physical Examination--  Vital Signs: T(F): 97.5 (05-01-22 @ 11:15), Max: 98.4 (04-30-22 @ 22:40)  HR: 78 (05-01-22 @ 11:15)  BP: 155/68 (05-01-22 @ 11:15)  RR: 16 (05-01-22 @ 11:15)  SpO2: 96% (05-01-22 @ 11:15)  Wt(kg): --  General: Nontoxic-appearing Male in no acute distress.  HEENT: AT/NC.   Neck: Not rigid. No sense of mass.  Nodes: None palpable.  Lungs: Clear bilaterally without rales, wheezing or rhonchi  Heart: Regular rate and rhythm.   Abdomen: Bowel sounds present and normoactive. Soft. Nondistended. Nontender. N  Extremities: No cyanosis or clubbing b/l stasis dermatitis   Skin: Warm. Dry. Good turgor. No rash. No vasculitic stigmata.        Laboratory Studies--  CBC                        10.7   16.22 )-----------( 356      ( 01 May 2022 07:29 )             36.9       Chemistries  04-30    133<L>  |  96  |  39<H>  ----------------------------<  171<H>  4.9   |  21<L>  |  6.92<H>    Ca    8.8      30 Apr 2022 07:06        Culture Data      < from: Xray Chest 1 View- PORTABLE-Routine (Xray Chest 1 View- PORTABLE-Routine .) (04.29.22 @ 13:46) >    ACC: [id number] EXAM:  XR CHEST PORTABLE ROUTINE 1V                          PROCEDURE DATE:  04/29/2022          INTERPRETATION:  Chest one view    HISTORY: Leukocytosis    COMPARISON STUDY: 4/17/2022    Frontal expiratory view of the chest shows the heart to be similarly   enlarged in size. Right jugular dialysis catheter reaches the upper right   atrium.    The lungs show clear right lung with small left effusion and there is no   evidence of pneumothorax nor right pleural effusion.    IMPRESSION:  Small left effusion.      < end of copied text >  < from: CT Head No Cont (04.27.22 @ 12:33) >  Sagittal and coronal reformats were provided.    COMPARISON: CT brain 4/9/2022    FINDINGS:    No hydrocephalus, mass effect, midline shift, acute intracranial   hemorrhage, or brain edema.    Mild to moderate white matter microvascular ischemic disease.    Visualized paranasal sinuses and mastoid air cells are clear.    IMPRESSION:    No hydrocephalus, acute intracranial hemorrhage, mass effect, or brain   edema.    < end of copied text >

## 2022-05-01 NOTE — DISCHARGE NOTE PROVIDER - PROVIDER TOKENS
PROVIDER:[TOKEN:[[id number]]],PROVIDER:[TOKEN:[[id number]]],PROVIDER:[TOKEN:[[id number]]],PROVIDER:[TOKEN:[[id number]]] PROVIDER:[TOKEN:[[id number]]],PROVIDER:[TOKEN:[[id number]]],PROVIDER:[TOKEN:[[id number]]],PROVIDER:[TOKEN:[[id number]]],PROVIDER:[TOKEN:[[id number]]]

## 2022-05-01 NOTE — DISCHARGE NOTE PROVIDER - NSDCCPCAREPLAN_GEN_ALL_CORE_FT
PRINCIPAL DISCHARGE DIAGNOSIS  Diagnosis: Acute renal failure on dialysis  Assessment and Plan of Treatment:       SECONDARY DISCHARGE DIAGNOSES  Diagnosis: Uremia  Assessment and Plan of Treatment:     Diagnosis: AMS (altered mental status)  Assessment and Plan of Treatment:     Diagnosis: Chronic atrial fibrillation  Assessment and Plan of Treatment:     Diagnosis: Acute cystitis  Assessment and Plan of Treatment:      PRINCIPAL DISCHARGE DIAGNOSIS  Diagnosis: Acute renal failure on dialysis  Assessment and Plan of Treatment: Your kidneys perform several critical functions. They clean your blood and support important bodily functions. When your kidneys cannot perform these functions, hemodialysis may be required. Hemodialysis is a treatment for kidney failure. Normally, the kidneys work to filter the blood and remove waste and excess salt and water (figure 1). Kidney failure, also called "end-stage kidney disease," is when the kidneys stop working completely. With hemodialysis, a machine takes over the job of the kidneys. Blood is pumped from the body, filtered through a dialysis machine, and then returned to the body. People can also have problems during dialysis treatments. These can include:   - Feeling lightheaded   - Trouble breathing   - Belly or muscle cramps   - Nausea or vomiting  Let your doctor or nurse know if you have any problems. Many of them can be treated.  .  - Weigh yourself every day – When your kidneys don't work, fluid collects in your body. Let your doctor or nurse know if you gain more weight than usual between dialysis treatments.  - Follow a special diet – You will need to limit the amount of fluids you drink. You might also need to avoid foods with a lot of sodium, potassium, and phosphorus. These are minerals that can build up in your body if you have kidney problems.  - Check your vascular access daily for signs of infection such as redness, pus and swelling. Notify your healthcare professional if you notice these signs.  - Keep your catheter bandage clean and dry. If your bandage gets wet, notify your healthcare professional.  - If you have a central line catheter, ask your healthcare professional why it is needed, how long it will be in place, and if you can use a fistula or graft for your dialysis treatment.  - Make sure that all healthcare providers clean their hands with soap and water or alcohol-based hand  before and after caring for you or your vascular access site.        SECONDARY DISCHARGE DIAGNOSES  Diagnosis: Acute cystitis  Assessment and Plan of Treatment: HOME CARE INSTRUCTIONS  f you were prescribed antibiotics, take them exactly as your caregiver instructs you. Finish the medication even if you feel better after you have only taken some of the medication.  Drink enough water and fluids to keep your urine clear or pale yellow.  Avoid caffeine, tea, and carbonated beverages. They tend to irritate your bladder.  Empty your bladder often. Avoid holding urine for long periods of time.  Empty your bladder before and after sexual intercourse.  After a bowel movement, women should cleanse from front to back. Use each tissue only once.  SEEK MEDICAL CARE IF:  You have back pain.  You develop a fever.  Your symptoms do not begin to resolve within 3 days.  SEEK IMMEDIATE MEDICAL CARE IF:  You have severe back pain or lower abdominal pain.  You develop chills.  You have nausea or vomiting.  You have continued burning or discomfort with urination.      Diagnosis: Chronic atrial fibrillation  Assessment and Plan of Treatment: Atrial fibrillation is the most common heart rhythm problem & has the risk of stroke & heart attack  It helps if you control your blood pressure, not drink more than 1-2 alcohol drinks per day, cut down on caffeine, getting treatment for over active thyroid gland, & getting exercise  Call your doctor if you feel your heart racing or beating unusually, chest tightness or pain, lightheaded, faint, shortness of breath especially with exercise  It is important to take your heart medication as prescribed  You may be on anticoagulation which is very important to take as directed - you may need blood work to monitor drug levels      Diagnosis: AMS (altered mental status)  Assessment and Plan of Treatment: HOME CARE INSTRUCTIONS  What family and friends can do:  To find out if someone is confused ask him or their name, age, and the date. If the person is unsure or answers incorrectly, he or she is confused.  Always introduce yourself, no matter how well the person knows you.  Often remind the person of his or her location.  Place a calendar and clock near the confused person.  Talk about current events and plans for the day.  Try to keep the environment calm, quiet and peaceful.  Make sure the patient keeps follow up appointments with their physician.  PREVENTION  Ways to prevent confusion:  Avoid alcohol.  Eat a balanced diet.  Get enough sleep.  Do not become isolated. Spend time with other people and make plans for your days.  Keep careful watch on your blood sugar levels if you are diabetic.  SEEK IMMEDIATE MEDICAL CARE IF:  You develop severe headaches, repeated vomiting, seizures, blackouts or slurred speech.  There is increasing confusion, weakness, numbness, restlessness or personality changes.  You develop a loss of balance, have marked dizziness, feel uncoordinated or fall.  You have delusions, hallucinations or develop severe anxiety.  Your family members think you need to be rechecked      Diagnosis: DM2 (diabetes mellitus, type 2)  Assessment and Plan of Treatment: HgA1C this admission 10.1  Make sure you get your HgA1c checked every three months.  If you take oral diabetes medications, check your blood glucose two times a day.  If you take insulin, check your blood glucose before meals and at bedtime.  It's important not to skip any meals.  Keep a log of your blood glucose results and always take it with you to your doctor appointments.  Keep a list of your current medications including injectables and over the counter medications and bring this medication list with you to all your doctor appointments.  If you have not seen your opthalmologist this year call for appointment.  Check your feet daily for redness, sores, or openings. Do not self treat. If no improvement in two days call your primary care physician for an appointment.  Low blood sugar (hypoglycemia) is a blood sugar below 70mg/dl. Check your blood sugar if you feel signs/symptoms of hypoglycemia. If your blood sugar is below 70 take 15 grams of carbohydrates (ex 4 oz of apple juice, 3-4 glucosr tablets, or 4-6 oz of regular soda) wait 15 minutes and repeat blood sugar to make sure it comes up above 70.  If your blood sugar is above 70 and you are due for a meal, have a meal.  If you are not due for a meal have a snack.  This snack helps keeps your blood sugar at a safe range.      Diagnosis: Orthostatic hypotension  Assessment and Plan of Treatment: s/p IV fluid.   stable.     PRINCIPAL DISCHARGE DIAGNOSIS  Diagnosis: Acute renal failure on dialysis  Assessment and Plan of Treatment: Your kidneys perform several critical functions. They clean your blood and support important bodily functions. When your kidneys cannot perform these functions, hemodialysis may be required. Hemodialysis is a treatment for kidney failure. Normally, the kidneys work to filter the blood and remove waste and excess salt and water (figure 1). Kidney failure, also called "end-stage kidney disease," is when the kidneys stop working completely. With hemodialysis, a machine takes over the job of the kidneys. Blood is pumped from the body, filtered through a dialysis machine, and then returned to the body. People can also have problems during dialysis treatments. These can include:   - Feeling lightheaded   - Trouble breathing   - Belly or muscle cramps   - Nausea or vomiting  Let your doctor or nurse know if you have any problems. Many of them can be treated.  .  - Weigh yourself every day – When your kidneys don't work, fluid collects in your body. Let your doctor or nurse know if you gain more weight than usual between dialysis treatments.  - Follow a special diet – You will need to limit the amount of fluids you drink. You might also need to avoid foods with a lot of sodium, potassium, and phosphorus. These are minerals that can build up in your body if you have kidney problems.  HD as scheduled. Follow up with Nephrology and vascular **   - Check your vascular access daily for signs of infection such as redness, pus and swelling. Notify your healthcare professional if you notice these signs.  - Keep your catheter bandage clean and dry. If your bandage gets wet, notify your healthcare professional.  - If you have a central line catheter, ask your healthcare professional why it is needed, how long it will be in place, and if you can use a fistula or graft for your dialysis treatment.  - Make sure that all healthcare providers clean their hands with soap and water or alcohol-based hand  before and after caring for you or your vascular access site.        SECONDARY DISCHARGE DIAGNOSES  Diagnosis: Acute cystitis  Assessment and Plan of Treatment: HOME CARE INSTRUCTIONS  f you were prescribed antibiotics, take them exactly as your caregiver instructs you. Finish the medication even if you feel better after you have only taken some of the medication.  Drink enough water and fluids to keep your urine clear or pale yellow.  Avoid caffeine, tea, and carbonated beverages. They tend to irritate your bladder.  Empty your bladder often. Avoid holding urine for long periods of time.  Empty your bladder before and after sexual intercourse.  After a bowel movement, women should cleanse from front to back. Use each tissue only once.  SEEK MEDICAL CARE IF:  You have back pain.  You develop a fever.  Your symptoms do not begin to resolve within 3 days.  SEEK IMMEDIATE MEDICAL CARE IF:  You have severe back pain or lower abdominal pain.  You develop chills.  You have nausea or vomiting.  You have continued burning or discomfort with urination.      Diagnosis: Chronic atrial fibrillation  Assessment and Plan of Treatment: Atrial fibrillation is the most common heart rhythm problem & has the risk of stroke & heart attack  It helps if you control your blood pressure, not drink more than 1-2 alcohol drinks per day, cut down on caffeine, getting treatment for over active thyroid gland, & getting exercise  Call your doctor if you feel your heart racing or beating unusually, chest tightness or pain, lightheaded, faint, shortness of breath especially with exercise  It is important to take your heart medication as prescribed  You may be on anticoagulation which is very important to take as directed - you may need blood work to monitor drug levels      Diagnosis: AMS (altered mental status)  Assessment and Plan of Treatment: HOME CARE INSTRUCTIONS  What family and friends can do:  To find out if someone is confused ask him or their name, age, and the date. If the person is unsure or answers incorrectly, he or she is confused.  Always introduce yourself, no matter how well the person knows you.  Often remind the person of his or her location.  Place a calendar and clock near the confused person.  Talk about current events and plans for the day.  Try to keep the environment calm, quiet and peaceful.  Make sure the patient keeps follow up appointments with their physician.  PREVENTION  Ways to prevent confusion:  Avoid alcohol.  Eat a balanced diet.  Get enough sleep.  Do not become isolated. Spend time with other people and make plans for your days.  Keep careful watch on your blood sugar levels if you are diabetic.  SEEK IMMEDIATE MEDICAL CARE IF:  You develop severe headaches, repeated vomiting, seizures, blackouts or slurred speech.  There is increasing confusion, weakness, numbness, restlessness or personality changes.  You develop a loss of balance, have marked dizziness, feel uncoordinated or fall.  You have delusions, hallucinations or develop severe anxiety.  Your family members think you need to be rechecked      Diagnosis: DM2 (diabetes mellitus, type 2)  Assessment and Plan of Treatment: HgA1C this admission 10.1  Make sure you get your HgA1c checked every three months.  If you take oral diabetes medications, check your blood glucose two times a day.  If you take insulin, check your blood glucose before meals and at bedtime.  It's important not to skip any meals.  Keep a log of your blood glucose results and always take it with you to your doctor appointments.  Keep a list of your current medications including injectables and over the counter medications and bring this medication list with you to all your doctor appointments.  If you have not seen your opthalmologist this year call for appointment.  Check your feet daily for redness, sores, or openings. Do not self treat. If no improvement in two days call your primary care physician for an appointment.  Low blood sugar (hypoglycemia) is a blood sugar below 70mg/dl. Check your blood sugar if you feel signs/symptoms of hypoglycemia. If your blood sugar is below 70 take 15 grams of carbohydrates (ex 4 oz of apple juice, 3-4 glucosr tablets, or 4-6 oz of regular soda) wait 15 minutes and repeat blood sugar to make sure it comes up above 70.  If your blood sugar is above 70 and you are due for a meal, have a meal.  If you are not due for a meal have a snack.  This snack helps keeps your blood sugar at a safe range.      Diagnosis: Orthostatic hypotension  Assessment and Plan of Treatment: Follow up with your medical doctor to establish long term blood pressure treatment goals.      Diagnosis: Carotid stenosis  Assessment and Plan of Treatment: Follow up with Dr. Pinto in  4-6 weeks and at that time will need mri brain w gado  for cva surveillance and repeat mra  of neck for rt ica stenosis       PRINCIPAL DISCHARGE DIAGNOSIS  Diagnosis: Acute renal failure on dialysis  Assessment and Plan of Treatment: Your kidneys perform several critical functions. They clean your blood and support important bodily functions. When your kidneys cannot perform these functions, hemodialysis may be required. Hemodialysis is a treatment for kidney failure. Normally, the kidneys work to filter the blood and remove waste and excess salt and water (figure 1). Kidney failure, also called "end-stage kidney disease," is when the kidneys stop working completely. With hemodialysis, a machine takes over the job of the kidneys. Blood is pumped from the body, filtered through a dialysis machine, and then returned to the body. People can also have problems during dialysis treatments. These can include:   - Feeling lightheaded   - Trouble breathing   - Belly or muscle cramps   - Nausea or vomiting  Let your doctor or nurse know if you have any problems. Many of them can be treated.  .  - Weigh yourself every day – When your kidneys don't work, fluid collects in your body. Let your doctor or nurse know if you gain more weight than usual between dialysis treatments.  - Follow a special diet – You will need to limit the amount of fluids you drink. You might also need to avoid foods with a lot of sodium, potassium, and phosphorus. These are minerals that can build up in your body if you have kidney problems.  HD as scheduled. Follow up with Nephrology and vascular **   -   Check your vascular access daily for signs of infection such as redness, pus and swelling. Notify your healthcare professional if you notice these signs.  - Keep your catheter bandage clean and dry. If your bandage gets wet, notify your healthcare professional.  - If you have a central line catheter, ask your healthcare professional why it is needed, how long it will be in place, and if you can use a fistula or graft for your dialysis treatment.  - Make sure that all healthcare providers clean their hands with soap and water or alcohol-based hand  before and after caring for you or your vascular access site.        SECONDARY DISCHARGE DIAGNOSES  Diagnosis: Acute cystitis  Assessment and Plan of Treatment: HOME CARE INSTRUCTIONS  f you were prescribed antibiotics, take them exactly as your caregiver instructs you. Finish the medication even if you feel better after you have only taken some of the medication.  Drink enough water and fluids to keep your urine clear or pale yellow.  Avoid caffeine, tea, and carbonated beverages. They tend to irritate your bladder.  Empty your bladder often. Avoid holding urine for long periods of time.  Empty your bladder before and after sexual intercourse.  After a bowel movement, women should cleanse from front to back. Use each tissue only once.  SEEK MEDICAL CARE IF:  You have back pain.  You develop a fever.  Your symptoms do not begin to resolve within 3 days.  SEEK IMMEDIATE MEDICAL CARE IF:  You have severe back pain or lower abdominal pain.  You develop chills.  You have nausea or vomiting.  You have continued burning or discomfort with urination.      Diagnosis: Chronic atrial fibrillation  Assessment and Plan of Treatment: Atrial fibrillation is the most common heart rhythm problem & has the risk of stroke & heart attack  It helps if you control your blood pressure, not drink more than 1-2 alcohol drinks per day, cut down on caffeine, getting treatment for over active thyroid gland, & getting exercise  Call your doctor if you feel your heart racing or beating unusually, chest tightness or pain, lightheaded, faint, shortness of breath especially with exercise  It is important to take your heart medication as prescribed  You may be on anticoagulation which is very important to take as directed - you may need blood work to monitor drug levels      Diagnosis: AMS (altered mental status)  Assessment and Plan of Treatment: HOME CARE INSTRUCTIONS  What family and friends can do:  To find out if someone is confused ask him or their name, age, and the date. If the person is unsure or answers incorrectly, he or she is confused.  Always introduce yourself, no matter how well the person knows you.  Often remind the person of his or her location.  Place a calendar and clock near the confused person.  Talk about current events and plans for the day.  Try to keep the environment calm, quiet and peaceful.  Make sure the patient keeps follow up appointments with their physician.  PREVENTION  Ways to prevent confusion:  Avoid alcohol.  Eat a balanced diet.  Get enough sleep.  Do not become isolated. Spend time with other people and make plans for your days.  Keep careful watch on your blood sugar levels if you are diabetic.  SEEK IMMEDIATE MEDICAL CARE IF:  You develop severe headaches, repeated vomiting, seizures, blackouts or slurred speech.  There is increasing confusion, weakness, numbness, restlessness or personality changes.  You develop a loss of balance, have marked dizziness, feel uncoordinated or fall.  You have delusions, hallucinations or develop severe anxiety.  Your family members think you need to be rechecked      Diagnosis: DM2 (diabetes mellitus, type 2)  Assessment and Plan of Treatment: HgA1C this admission 10.1  Make sure you get your HgA1c checked every three months.  If you take oral diabetes medications, check your blood glucose two times a day.  If you take insulin, check your blood glucose before meals and at bedtime.  It's important not to skip any meals.  Keep a log of your blood glucose results and always take it with you to your doctor appointments.  Keep a list of your current medications including injectables and over the counter medications and bring this medication list with you to all your doctor appointments.  If you have not seen your opthalmologist this year call for appointment.  Check your feet daily for redness, sores, or openings. Do not self treat. If no improvement in two days call your primary care physician for an appointment.  Low blood sugar (hypoglycemia) is a blood sugar below 70mg/dl. Check your blood sugar if you feel signs/symptoms of hypoglycemia. If your blood sugar is below 70 take 15 grams of carbohydrates (ex 4 oz of apple juice, 3-4 glucosr tablets, or 4-6 oz of regular soda) wait 15 minutes and repeat blood sugar to make sure it comes up above 70.  If your blood sugar is above 70 and you are due for a meal, have a meal.  If you are not due for a meal have a snack.  This snack helps keeps your blood sugar at a safe range.      Diagnosis: Orthostatic hypotension  Assessment and Plan of Treatment: Follow up with your medical doctor to establish long term blood pressure treatment goals.      Diagnosis: Carotid stenosis  Assessment and Plan of Treatment: Follow up with Dr. Pinto in  4-6 weeks and at that time will need mri brain w gado  for cva surveillance and repeat mra  of neck for rt ica stenosis

## 2022-05-01 NOTE — DISCHARGE NOTE PROVIDER - CARE PROVIDERS DIRECT ADDRESSES
,DirectAddress_Unknown,[email or web address],DirectAddress_Unknown,DirectAddress_Unknown ,[email or web address],[email or web address],DirectAddress_Unknown,DirectAddress_Unknown,[email or web address]

## 2022-05-01 NOTE — PROGRESS NOTE ADULT - ASSESSMENT
Patient is a 67 year old male with PMH of CAD with past multiple PCI, with most recent discharge from [facility] in Feb 2022 for NSTEMI with LULU of an 85% prox LAD lesion with patient on ASA and now off Plavix per cardiology (only for one month), chronic afib on Pradaxa, HTN, type 2 DM on insulin, diabetic neuropathy with chronic RIGHT LE venous stasis changes>left, LEFT foot ulcer seen by podiatry, past endarterectomy LEFT CEA in 2014 who presented with UTI with hematuria diagnosed at urgent care and now with decreased urine output.    4/18 for LUE AVF creation, possible graft placement.    Acute cystitis  Leukocytosis  S/p ceftriaxone x7 day course completed 4/9  C/w monitoring off Abx.     --Leukocytosis noted to be elevated; pt has had fluctuations throughout hospital course.   blood cx pending  cxr no new infiltrate no s/s of pna  if diarrhea -- send stool for cdiff  cont to monitor off anitbx     ANT on CKD3  Nephrology following, appreciate recs  s/p placement of shiley and initiation of HD; s/p permcath placement 4/20  monitor Cr   renally dose meds   Avoid nephrotoxic agents    B/l LE edema with chronic venous stasis  Acute on chronic HFpEF  legs cool to touch, nontender, chronic changes with no sign of infection/cellulitis  has wound on bottom of L foot - dry and does not appear infected either  elevate lower extremities      DM2 with neuropathy  Blood glucose management per primary team.

## 2022-05-01 NOTE — DISCHARGE NOTE PROVIDER - NSDCMRMEDTOKEN_GEN_ALL_CORE_FT
allopurinol 100 mg oral tablet: 1 tab(s) orally once a day  aspirin 81 mg oral delayed release tablet: 1 tab(s) orally once a day  atorvastatin 40 mg oral tablet: 1 tab(s) orally once a day (at bedtime)  bumetanide 2 mg oral tablet: 1 tab(s) orally once a day  collagenase 250 units/g topical ointment: 1 application topically once a day  dilTIAZem 120 mg/24 hours oral capsule, extended release: 1 cap(s) orally once a day  insulin glargine 100 units/mL subcutaneous solution: 25 unit(s) subcutaneous once a day (at bedtime)  metOLazone 5 mg oral tablet: 1 tab(s) orally 3 times a week  metoprolol succinate 50 mg oral tablet, extended release: 2 tab(s) orally once a day  NovoLOG 100 units/mL subcutaneous solution: subcutaneous 4 times a day (before meals and at bedtime)  NovoLOG FlexPen 100 units/mL injectable solution: 10 unit(s) subcutaneous 3 times a day  oxycodone-acetaminophen 5 mg-325 mg oral tablet: 1 tab(s) orally 2 times a day  Pradaxa 150 mg oral capsule: 1 cap(s) orally 2 times a day  pregabalin 100 mg oral capsule: 1 cap(s) orally 3 times a day

## 2022-05-01 NOTE — PROGRESS NOTE ADULT - ASSESSMENT
67 year old gentleman with PMH of CAD, NSTEMI s/p 3 stents in 2014 (stents to LAD, proximal and distal LCx), ischemic cardiomyopathy, HTN for 10 years, T2DM for 26 years c/b peripheral neuropathy, CVA, TIA, Afib on Pradaxa, chronic RLE wound, L carotid stenosis s/p endarterectomy (2014) just recently admitted  to the [facility] on 2/19/2022 with acute onset chest pain for one day found to have an NSTEMI, CAD, s/p PCI in setting of increased AF rates off bb for 3 days and resolved chest pain, his CKMB peaked and Echo noted with EF 60%,normal LV function, mild TR, mild GA. He was s/p [facility] with 85% proximal LAD s/p balloon angioplasty and LULU. He presented to [facility] ED with decreased urine output over the week. Mr. Stevens went to [facility] for hematuria and was started  on Nitrofurantoin. Pt is still taking Nitrofurantoin w/ 5 days left. He came to the ED due to worsening symptoms. Pt reports having a similar incident 4-5 years ago that resolved spontaneously. He reports increased leg edema Dyspnea worse on exertion. his baseline Serum creatinine is in the 2.0 to 2.3 mg/dl range. ANT with serum creatinine of 8.29 with bun 144 and k 5.5        1- ANT on ckd III ---> ESRD likely   2- chf chronic   3- hyperphosphatemia /shpt   4- anemia   5- hyperlipidemia   6- HTN /a fib  7- TIA hx       no hd today   renvela 2 tab with meals   anemia - epogen 97516 Units TIW with HD.  PT  ? add low dose seroquel  leukocytosis cx pending ID following   hydralazine 25 mg tid

## 2022-05-01 NOTE — DISCHARGE NOTE PROVIDER - DETAILS OF MALNUTRITION DIAGNOSIS/DIAGNOSES
This patient has been assessed with a concern for Malnutrition and was treated during this hospitalization for the following Nutrition diagnosis/diagnoses:     -  05/05/2022: Severe protein-calorie malnutrition

## 2022-05-01 NOTE — DISCHARGE NOTE PROVIDER - HOSPITAL COURSE
68 y/o M with history of CAD, s/p multiple PCI, with most recent 2/22 PCI of LAD in setting of NSTEMI, on ASA and Pradaxa chronic atrial fibrillation (maintained on Pradaxa), essential HTN, type 2 DM previously on oral medications but now on insulin, diabetic neuropathy with chronic RIGHT LE venous stasis changes>left, LEFT foot ulcer seen by podiatry and previous past endarterectomy (LEFT CEA) in 2014.  Presented with 1 week of decreased urine output -> cystitis with hematuria   #ANT on CKD, new HD  -oliguric renal failure in setting of UTI/cystitis  -New HD for uremia, renal failure - 4/5/22  -sp rrt 4/12 for uncontrolled bleeding sp  right groin shiley removal    -s/p L AVG 4/18  -s/p permacath placement 4/20  #AMS/Lethargy  -AMS sp RRT 4/27  likely secondary to pain med   -repeat CT 4/27 unremarkable --  discontinued Percocet with improvement  #Acute on chronic HFpEF  -stable  -continue volume removal with HD  -c/w  bb  #Chronic AF  -rates overall stable   -c/w metoprolol succ 100 mg qd  -c/w dilt cd 120mg daily    -c/w eliquis 5 mg BID for now - heme stable   #CAD, s/p PCI, most recent 2/2022  -stable, cont asa  -off plavix due to a/c indication - now on Eliquis  -statin   #R carotid stenosis  -cont med tx  -MRA noted with Greater than 75% stenosis of the right distal common carotid artery. Approximately 60% stenosis of the proximal left internal carotid artery.  -Continue ASA and Statin Therapy  -vasc outpt f/u Dr Pinto 68 y/o M with history of CAD, s/p multiple PCI, with most recent 2/22 PCI of LAD in setting of NSTEMI, on ASA and Pradaxa chronic atrial fibrillation (maintained on Pradaxa), essential HTN, type 2 DM previously on oral medications but now on insulin, diabetic neuropathy with chronic RIGHT LE venous stasis changes>left, LEFT foot ulcer seen by podiatry and previous past endarterectomy (LEFT CEA) in 2014.  Presented with 1 week of decreased urine output -> cystitis with hematuria   #ANT on CKD, new HD on T/Th/Sat  -oliguric renal failure in setting of UTI/cystitis  -New HD for uremia, renal failure - 4/5/22  -sp rrt 4/12 for uncontrolled bleeding sp  right groin shiley removal    -s/p L AVG 4/18  -s/p permacath placement 4/20  #AMS/Lethargy  -AMS sp RRT 4/27  likely secondary to pain med   -repeat CT 4/27 unremarkable --  discontinued Percocet with improvement  #Acute on chronic HFpEF  -stable  -continue volume removal with HD  -c/w  bb  #Chronic AF  -rates overall stable   -c/w metoprolol succ 100 mg qd  -c/w dilt cd 120mg daily    -c/w eliquis 5 mg BID for now - heme stable   #CAD, s/p PCI, most recent 2/2022  -stable, cont asa  -off plavix due to a/c indication - now on Eliquis  -statin   #R carotid stenosis  -cont med tx  -MRA noted with Greater than 75% stenosis of the right distal common carotid artery. Approximately 60% stenosis of the proximal left internal carotid artery.  -Continue ASA and Statin Therapy  -vasc outpt f/u Dr Pinto    pt remains stable for DC and will f/u with PCP, Renal and vascular after dc from [facility].

## 2022-05-01 NOTE — PROGRESS NOTE ADULT - ASSESSMENT
A/P    68 y/o M with history of CAD, s/p multiple PCI, with most recent 2/22 PCI of LAD in setting of NSTEMI, on ASA only (pradaxa), chronic atrial fibrillation maintained on Pradaxa, essential HTN, type 2 DM previously on oral medications but on insulin, diabetic neuropathy with chronic RIGHT LE venous stasis changes>left, LEFT foot ulcer seen by podiatry, past endarterectomy LEFT CEA in 2014 presenting with 1 week of decreased urine output, cystitis with hematuria     #ANT on CKD, new HD  -oliguric renal failure in setting of UTI/cystitis  -etiology ??  -New HD for uremia, renal failure  -sp rrt 4/12 for uncontrolled bleeding sp  right groin shiley removal  - monitor h/h - stable  -s/p L AVG 4/18  -s/p permacath placement 4/20  -renal f/ u    #AMS/Lethargy  -recent ams from pain meds  -AMS sp RRT 4/27  likely secondary to pain med   -repeat CT 4/27 unremarkable --  discontinued Percocet- improved today  -med f/u     #Acute on chronic HFpEF  -stable  -continue volume removal with HD  -c/w  bb    #Chronic AF  -rates overall stable   -c/w metoprolol succ 100 mg qd  -c/w dilt cd 120mg daily    -c/w eliquis 5 mg BID for now - heme stable     #HTN  -stable  -c/w meds   -increase hydral if bp remains elevated     #CAD, s/p PCI, most recent 2/2022  -stable, cont asa  -off plavix due to a/c indication  -statin     #R carotid stenosis  -cont med tx  -MRA noted with Greater than 75% stenosis of the right distal common carotid artery. Approximately 60% stenosis of the proximal left internal carotid artery.  -Continue ASA and Statin Therapy  -vasc outpt f/u Dr Pinto    plan discussed with wife at bedside

## 2022-05-01 NOTE — PROGRESS NOTE ADULT - SUBJECTIVE AND OBJECTIVE BOX
Patient is a 67y old  Male who presents with a chief complaint of Decreased urine output for the past week, leg oedema (01 May 2022 10:24)      DATE OF SERVICE: 05-01-22 @ 11:05    SUBJECTIVE / OVERNIGHT EVENTS: overnight events noted    ROS:  Resp: No cough no sputum production  CVS: No chest pain no palpitations no orthopnea  GI: no N/V/D  per RN        MEDICATIONS  (STANDING):  allopurinol 100 milliGRAM(s) Oral daily  apixaban 5 milliGRAM(s) Oral two times a day  aspirin enteric coated 81 milliGRAM(s) Oral daily  atorvastatin 40 milliGRAM(s) Oral at bedtime  chlorhexidine 2% Cloths 1 Application(s) Topical <User Schedule>  chlorhexidine 4% Liquid 1 Application(s) Topical <User Schedule>  dextrose 50% Injectable 25 Gram(s) IV Push once  dextrose 50% Injectable 25 Gram(s) IV Push once  dextrose 50% Injectable 12.5 Gram(s) IV Push once  diltiazem    milliGRAM(s) Oral daily  hydrALAZINE 25 milliGRAM(s) Oral three times a day  insulin lispro (ADMELOG) corrective regimen sliding scale   SubCutaneous three times a day before meals  insulin lispro (ADMELOG) corrective regimen sliding scale   SubCutaneous at bedtime  lidocaine   4% Patch 1 Patch Transdermal daily  metoprolol succinate  milliGRAM(s) Oral daily  polyethylene glycol 3350 17 Gram(s) Oral two times a day  povidone iodine 10% Solution 1 Application(s) Topical daily  senna 2 Tablet(s) Oral at bedtime  sevelamer carbonate 1600 milliGRAM(s) Oral three times a day with meals    MEDICATIONS  (PRN):  bisacodyl 5 milliGRAM(s) Oral every 12 hours PRN Constipation  dextrose Oral Gel 15 Gram(s) Oral once PRN Blood Glucose LESS THAN 70 milliGRAM(s)/deciliter  sodium chloride 0.9% lock flush 10 milliLiter(s) IV Push every 1 hour PRN Pre/post blood products, medications, blood draw, and to maintain line patency        CAPILLARY BLOOD GLUCOSE      POCT Blood Glucose.: 212 mg/dL (01 May 2022 08:40)  POCT Blood Glucose.: 156 mg/dL (30 Apr 2022 22:31)  POCT Blood Glucose.: 216 mg/dL (30 Apr 2022 16:58)  POCT Blood Glucose.: 284 mg/dL (30 Apr 2022 12:21)    I&O's Summary    30 Apr 2022 07:01  -  01 May 2022 07:00  --------------------------------------------------------  IN: 500 mL / OUT: 2000 mL / NET: -1500 mL        Vital Signs Last 24 Hrs  T(C): 36.3 (01 May 2022 04:42), Max: 36.9 (30 Apr 2022 22:40)  T(F): 97.4 (01 May 2022 04:42), Max: 98.4 (30 Apr 2022 22:40)  HR: 86 (01 May 2022 04:42) (85 - 105)  BP: 149/69 (01 May 2022 04:42) (113/64 - 149/69)  BP(mean): --  RR: 18 (01 May 2022 04:42) (17 - 18)  SpO2: 96% (01 May 2022 04:42) (96% - 98%)    PHYSICAL EXAM:  GENERAL: in no distress  EYES: EOMI, PERRLA,  NECK: Supple, No JVD  CHEST/LUNG: clear  HEART: S1 S2; no murmurs   ABDOMEN: Soft, Nontender  EXTREMITIES: no edema  NEUROLOGY: more alert   remains confused however       LABS:                        10.7   16.22 )-----------( 356      ( 01 May 2022 07:29 )             36.9     04-30    133<L>  |  96  |  39<H>  ----------------------------<  171<H>  4.9   |  21<L>  |  6.92<H>    Ca    8.8      30 Apr 2022 07:06                  All consultant(s) notes reviewed and care discussed with other providers        Contact Number, Dr King [phone]

## 2022-05-01 NOTE — DISCHARGE NOTE PROVIDER - NSDCFUADDINST_GEN_ALL_CORE_FT
-MRA noted with Greater than 75% stenosis of the right distal common carotid artery. Approximately 60% stenosis of the proximal left internal carotid artery. - Vascular Surgery Out- Patient Follow Up with Dr Pinto

## 2022-05-01 NOTE — PROGRESS NOTE ADULT - ASSESSMENT
·  Problem: Acute kidney injury superimposed on CKD. pt currently on hd  to cont as per renal  avf done    perm cath done     Problem/Plan - 2:  ·  Problem: Chronic atrial fibrillation. c/w a/c  cards f/u     Problem/Plan - 3:  ·  Problem: Cystitis.   ID follow up appreciated   to 15 again today  ID follow up requested   cultures negative so far    CXR no pneumonia      Problem/Plan - 4:  ·  Problem: Type 2 diabetes mellitus. c/w insulin   endo f/u     Problem/Plan - 5:  ·  Problem: CAD (coronary artery disease).   stable  cards f/u     constipation   bowel regimen      gout pain  control    rehab planning    discussed with patient's wife at bedside in detail

## 2022-05-01 NOTE — DISCHARGE NOTE PROVIDER - CARE PROVIDER_API CALL
Yayo Gallegos  INTERNAL MEDICINE  [address], [address]  [place] [address]  Phone: [phone]  Fax: [phone]  Follow Up Time:     Tamela Real (DO)  Internal Medicine  [address]  [place] [address]  Phone: [phone]  Fax: [phone]  Follow Up Time:     Logan Cordova (DO)  Nephrology  [address]  [place] [address]  Phone: [phone]  Fax: [phone]  Follow Up Time:     Colby Mazariegos (MD)  Cardiovascular Disease; Interventional Cardiology; Nuclear Cardiology  [address] [facility] [address] [facility] [address]  [place] [address]  Phone: [phone]  Fax: [phone]  Follow Up Time:    Yayo Gallegos  INTERNAL MEDICINE  [address], [address]  [place] [address]  Phone: [phone]  Fax: [phone]  Follow Up Time:     Tamela Real (DO)  Internal Medicine  [address]  [place] [address]  Phone: [phone]  Fax: [phone]  Follow Up Time:     Logan Cordova (DO)  Nephrology  [address]  [place] [address]  Phone: [phone]  Fax: [phone]  Follow Up Time:     Colby Mazariegos (MD)  Cardiovascular Disease; Interventional Cardiology; Nuclear Cardiology  [address]  [place] [address]  Phone: [phone]  Fax: [phone]  Follow Up Time:     Edward Pinto)  Vascular Surgery  [address]  [place] [address]  Phone: [phone]  Fax: [phone]  Follow Up Time:

## 2022-05-01 NOTE — PROGRESS NOTE ADULT - SUBJECTIVE AND OBJECTIVE BOX
[facility]   [phone]  RENAL FOLLOW UP NOTE  --------------------------------------------------------------------------------  Chief Complaint:    24 hour events/subjective:    seen earlier still intermittent confusion     PAST HISTORY  --------------------------------------------------------------------------------  No significant changes to PMH, PSH, FHx, SHx, unless otherwise noted    ALLERGIES & MEDICATIONS  --------------------------------------------------------------------------------  Allergies    nitrofurantoin (Nephrotoxicity)    Intolerances      Standing Inpatient Medications  allopurinol 100 milliGRAM(s) Oral daily  apixaban 5 milliGRAM(s) Oral two times a day  aspirin enteric coated 81 milliGRAM(s) Oral daily  atorvastatin 40 milliGRAM(s) Oral at bedtime  chlorhexidine 2% Cloths 1 Application(s) Topical <User Schedule>  chlorhexidine 4% Liquid 1 Application(s) Topical <User Schedule>  dextrose 50% Injectable 25 Gram(s) IV Push once  dextrose 50% Injectable 25 Gram(s) IV Push once  dextrose 50% Injectable 12.5 Gram(s) IV Push once  diltiazem    milliGRAM(s) Oral daily  hydrALAZINE 25 milliGRAM(s) Oral three times a day  insulin lispro (ADMELOG) corrective regimen sliding scale   SubCutaneous at bedtime  insulin lispro (ADMELOG) corrective regimen sliding scale   SubCutaneous three times a day before meals  lidocaine   4% Patch 1 Patch Transdermal daily  metoprolol succinate  milliGRAM(s) Oral daily  polyethylene glycol 3350 17 Gram(s) Oral two times a day  povidone iodine 10% Solution 1 Application(s) Topical daily  senna 2 Tablet(s) Oral at bedtime  sevelamer carbonate 1600 milliGRAM(s) Oral three times a day with meals    PRN Inpatient Medications  bisacodyl 5 milliGRAM(s) Oral every 12 hours PRN  dextrose Oral Gel 15 Gram(s) Oral once PRN  sodium chloride 0.9% lock flush 10 milliLiter(s) IV Push every 1 hour PRN      REVIEW OF SYSTEMS  --------------------------------------------------------------------------------    Gen: denies fevers/chills,  CVS: denies chest pain/palpitations  Resp: denies SOB/Cough  GI: Denies N/V/Abd pain  : Denies dysuria      VITALS/PHYSICAL EXAM  --------------------------------------------------------------------------------  T(C): 36.7 (05-01-22 @ 19:11), Max: 36.9 (04-30-22 @ 22:40)  HR: 98 (05-01-22 @ 19:11) (78 - 105)  BP: 136/57 (05-01-22 @ 19:11) (113/64 - 155/68)  RR: 17 (05-01-22 @ 19:11) (16 - 18)  SpO2: 97% (05-01-22 @ 19:11) (96% - 97%)  Wt(kg): --        04-30-22 @ 07:01  -  05-01-22 @ 07:00  --------------------------------------------------------  IN: 500 mL / OUT: 2000 mL / NET: -1500 mL    05-01-22 @ 07:01  -  05-01-22 @ 19:18  --------------------------------------------------------  IN: 250 mL / OUT: 0 mL / NET: 250 mL      Physical Exam:  	    Gen: Appears comfortable,   responsive   	Pulm: Decreased breath sounds b/l bases.  	CV: No JVD. IRR,   	Abd: +BS, soft, nontender, softly distended, obese               Extremity UE: increased warmth, R hand, increased warmth, swollen              Extremity LE: + hyperpigmented bilateral LE with B/L  no edema              Vascular: R IJ HD catheter, L arm AVF       LABS/STUDIES  --------------------------------------------------------------------------------              10.7   16.22 >-----------<  356      [05-01-22 @ 07:29]              36.9     133  |  96  |  39  ----------------------------<  171      [04-30-22 @ 07:06]  4.9   |  21  |  6.92        Ca     8.8     [04-30-22 @ 07:06]            Creatinine Trend:  SCr 6.92 [04-30 @ 07:06]  SCr 5.35 [04-29 @ 07:23]  SCr 8.09 [04-28 @ 07:10]  SCr 6.81 [04-27 @ 07:02]  SCr 9.03 [04-26 @ 05:49]              Urinalysis - [04-03-22 @ 00:48]      Color Light Orange / Appearance Turbid / SG 1.021 / pH 5.5      Gluc Negative / Ketone Negative  / Bili Negative / Urobili Negative       Blood Large / Protein 300 mg/dL / Leuk Est Large / Nitrite Negative      RBC 20 /  / Hyaline 10 / Gran  / Sq Epi  / Non Sq Epi 3 / Bacteria Negative      Iron 16, TIBC 239, %sat 7      [04-15-22 @ 10:16]  Ferritin 172      [04-15-22 @ 09:05]  PTH -- (Ca 8.3)      [04-15-22 @ 12:18]   150  PTH -- (Ca --)      [04-03-22 @ 23:06]   97  HbA1c 11.7      [11-07-19 @ 09:14]  TSH 1.98      [04-05-22 @ 05:19]    DEREJE: titer Negative, pattern Negative      [04-11-22 @ 16:24]  C3 Complement 61      [04-11-22 @ 16:56]  C4 Complement 28      [04-11-22 @ 16:56]  ANCA: cANCA Negative, pANCA Negative, atypical ANCA Indeterminate Method interference due to DEREJE fluorescence      [04-11-22 @ 16:24]  anti-GBM 3      [04-11-22 @ 19:27]  Free Light Chains: kappa 16.92, lambda 12.63, ratio = 1.34      [04-11 @ 16:56]  Immunofixation Serum: No Monoclonal Band Identified    Reference Range: None Detected      [04-11-22 @ 16:56]  SPEP Interpretation: Normal Electrophoresis Pattern      [04-11-22 @ 16:56]     Anesthesia Volume In Cc (Will Not Render If 0): 0.7

## 2022-05-01 NOTE — DISCHARGE NOTE PROVIDER - NPI NUMBER (FOR SYS[employer] USE ONLY) :
[[id number]],[[id number]] [[id number]],[[id number]],[[id number]]

## 2022-05-01 NOTE — PROGRESS NOTE ADULT - SUBJECTIVE AND OBJECTIVE BOX
CARDIOLOGY FOLLOW UP - Dr. Mazariegos  Date of Service: 5/1/22  CC: no events, complaining of constipation, no cp/sob    Review of Systems:  Constitutional: No fever, weight loss, or fatigue  Respiratory: No cough, wheezing, or hemoptysis, no shortness of breath  Cardiovascular: No chest pain, palpitaitons, passing out, dizziness, or leg swelling  Gastrointestinal: No abd or epigastric pain. No nausea, vomitting, or hematemesis; no diarrhea or consiptaiton, no melena or hematochezia  Vascular: No edema     TELEMETRY:    PHYSICAL EXAM:  T(C): 36.3 (05-01-22 @ 04:42), Max: 37 (04-30-22 @ 09:32)  HR: 86 (05-01-22 @ 04:42) (85 - 105)  BP: 149/69 (05-01-22 @ 04:42) (113/64 - 149/69)  RR: 18 (05-01-22 @ 04:42) (17 - 18)  SpO2: 96% (05-01-22 @ 04:42) (96% - 98%)  Wt(kg): --  I&O's Summary    30 Apr 2022 07:01  -  01 May 2022 07:00  --------------------------------------------------------  IN: 500 mL / OUT: 2000 mL / NET: -1500 mL        Appearance: Normal	  Cardiovascular: Normal S1 S2,RRR, No JVD, No murmurs  Respiratory: Lungs clear to auscultation	  Gastrointestinal:  Soft, Non-tender, + BS	  Extremities: Normal range of motion, No clubbing, cyanosis or edema  Vascular: Peripheral pulses palpable 2+ bilaterally       Home Medications:  allopurinol 100 mg oral tablet: 1 tab(s) orally once a day (03 Apr 2022 06:34)  aspirin 81 mg oral delayed release tablet: 1 tab(s) orally once a day (03 Apr 2022 06:34)  bumetanide 2 mg oral tablet: 1 tab(s) orally once a day (03 Apr 2022 06:34)  insulin glargine 100 units/mL subcutaneous solution: 25 unit(s) subcutaneous once a day (at bedtime) (03 Apr 2022 06:34)  metOLazone 5 mg oral tablet: 1 tab(s) orally 3 times a week (03 Apr 2022 06:34)  metoprolol succinate 50 mg oral tablet, extended release: 2 tab(s) orally once a day (03 Apr 2022 06:34)  NovoLOG 100 units/mL subcutaneous solution: subcutaneous 4 times a day (before meals and at bedtime) (03 Apr 2022 06:34)  NovoLOG FlexPen 100 units/mL injectable solution: 10 unit(s) subcutaneous 3 times a day (03 Apr 2022 06:34)  oxycodone-acetaminophen 5 mg-325 mg oral tablet: 1 tab(s) orally 2 times a day (03 Apr 2022 06:34)  Pradaxa 150 mg oral capsule: 1 cap(s) orally 2 times a day (03 Apr 2022 06:34)  pregabalin 100 mg oral capsule: 1 cap(s) orally 3 times a day (03 Apr 2022 06:34)        MEDICATIONS  (STANDING):  allopurinol 100 milliGRAM(s) Oral daily  apixaban 5 milliGRAM(s) Oral two times a day  aspirin enteric coated 81 milliGRAM(s) Oral daily  atorvastatin 40 milliGRAM(s) Oral at bedtime  chlorhexidine 2% Cloths 1 Application(s) Topical <User Schedule>  chlorhexidine 4% Liquid 1 Application(s) Topical <User Schedule>  dextrose 50% Injectable 25 Gram(s) IV Push once  dextrose 50% Injectable 25 Gram(s) IV Push once  dextrose 50% Injectable 12.5 Gram(s) IV Push once  diltiazem    milliGRAM(s) Oral daily  hydrALAZINE 25 milliGRAM(s) Oral three times a day  insulin lispro (ADMELOG) corrective regimen sliding scale   SubCutaneous at bedtime  insulin lispro (ADMELOG) corrective regimen sliding scale   SubCutaneous three times a day before meals  lidocaine   4% Patch 1 Patch Transdermal daily  metoprolol succinate  milliGRAM(s) Oral daily  polyethylene glycol 3350 17 Gram(s) Oral two times a day  povidone iodine 10% Solution 1 Application(s) Topical daily  senna 2 Tablet(s) Oral at bedtime  sevelamer carbonate 1600 milliGRAM(s) Oral three times a day with meals        EKG:  RADIOLOGY:  DIAGNOSTIC TESTING:  [ ] Echocardiogram:  [ ] Catherterization:  [ ] Stress Test:  OTHER:     LABS:	 	                          10.7   16.22 )-----------( 356      ( 01 May 2022 07:29 )             36.9     04-30    133<L>  |  96  |  39<H>  ----------------------------<  171<H>  4.9   |  21<L>  |  6.92<H>    Ca    8.8      30 Apr 2022 07:06            CARDIAC MARKERS:

## 2022-05-02 NOTE — PROGRESS NOTE ADULT - ASSESSMENT
A/P    68 y/o M with history of CAD, s/p multiple PCI, with most recent 2/22 PCI of LAD in setting of NSTEMI, on ASA only (pradaxa), chronic atrial fibrillation maintained on Pradaxa, essential HTN, type 2 DM previously on oral medications but on insulin, diabetic neuropathy with chronic RIGHT LE venous stasis changes>left, LEFT foot ulcer seen by podiatry, past endarterectomy LEFT CEA in 2014 presenting with 1 week of decreased urine output, cystitis with hematuria     #ANT on CKD, new HD  -oliguric renal failure in setting of UTI/cystitis  -etiology ??  -New HD for uremia, renal failure  -sp rrt 4/12 for uncontrolled bleeding sp  right groin shiley removal  - monitor h/h - stable  -s/p L AVG 4/18  -s/p permacath placement 4/20  -renal f/ u    #AMS/Lethargy  -recent ams from pain meds  -AMS sp RRT 4/27  likely secondary to pain med   -repeat CT 4/27 unremarkable --  discontinued Percocet- improved   -med f/u     #Acute on chronic HFpEF  -stable  -continue volume removal with HD  -c/w  bb    #Chronic AF  -rates overall stable   -c/w metoprolol succ 100 mg qd  -c/w dilt cd 120mg daily    -c/w eliquis 5 mg BID for now - heme stable     #HTN  -stable  -c/w meds   -increase hydral if bp remains elevated     #CAD, s/p PCI, most recent 2/2022  -stable, cont asa  -off plavix due to a/c indication  -statin     #R carotid stenosis  -cont med tx  -MRA noted with Greater than 75% stenosis of the right distal common carotid artery. Approximately 60% stenosis of the proximal left internal carotid artery.  -Continue ASA and Statin Therapy  -vasc outpt f/u Dr Pinto    dc to rehab

## 2022-05-02 NOTE — PROGRESS NOTE ADULT - PROBLEM SELECTOR PLAN 1
Likely 2nd to b/l pl effusions, atelectasis  -Suspect fluid overload given elevated proBNP, LE edema on admission   -Keep O>I with HD as tolerated   -Pt with hx of hydroPTX. CT chest in 2/2022 with pleural thickening, atelectasis. Findings at the time suspected to be chronic. CT A/P 4/2 with small b/l pl effusions L>R, pleural thickening  -CT chest now with small b/l pl effusion R>L, bibasilar atelectasis  -SOB resolved  -Keep sats >90% with supplemental O2 as needed (currently RA)  -CXR now with improved congestion   -D/c planning per primary team

## 2022-05-02 NOTE — PROGRESS NOTE ADULT - ASSESSMENT
Patient is a 67 year old male with PMH of CAD with past multiple PCI, with most recent discharge from [place] in Feb 2022 for NSTEMI with LULU of an 85% prox LAD lesion with patient on ASA and now off Plavix per cardiology (only for one month), chronic afib on Pradaxa, HTN, type 2 DM on insulin, diabetic neuropathy with chronic RIGHT LE venous stasis changes>left, LEFT foot ulcer seen by podiatry, past endarterectomy LEFT CEA in 2014 who presented with UTI with hematuria diagnosed at urgent care and now with decreased urine output.    4/18 for LUE AVF creation, possible graft placement.    Leukocytosis  pt has had fluctuations throughout hospital course  all cultures remain NGTD including 4/30  monitor stool output and consistency  CXR w/o evidence of focal consolidation and pt w/o respiratory symptoms  pt no longer makes urine, denies abd pain or suprapubic tenderness  no tenderness or erythema at IV sites  no frontal, maxillary or ethmoid sinus tenderness on exam  pt w/ poor dentitions  continue to monitor off antibiotics    Acute cystitis  S/p ceftriaxone x7 day course completed 4/9  continue to monitor off antibiotics    ANT on CKD3  Nephrology following, appreciate recs  s/p placement of shiley and initiation of HD; s/p permcath placement 4/20  monitor Cr   renally dose meds   Avoid nephrotoxic agents    B/l LE edema with chronic venous stasis  Acute on chronic HFpEF  legs cool to touch, nontender, chronic changes with no sign of infection/cellulitis  has wound on bottom of L foot - dry and does not appear infected either  elevate lower extremities      DM2 with neuropathy  Blood glucose management per primary team.    Eugene Meeks M.D.  [facility], Division of Infectious Diseases  [phone]  After 5pm on weekdays and all day on weekends - please call [phone]

## 2022-05-02 NOTE — PROGRESS NOTE ADULT - SUBJECTIVE AND OBJECTIVE BOX
[facility], Division of Infectious Diseases  ADRIANA Seals Y. Patel, S. Shah, G. Casimir  [phone]  ([phone] - weekdays after 5pm and weekends)    Name: DYLAN DEL CASTILLO  Age/Gender: 67y Male  MRN: [id number]    Interval History:  Patient seen this morning.   Notes reviewed.   No concerning overnight events.  Afebrile.   patient denies resp symptoms, abd pain, pain at IV sites  family at bedside    Allergies: nitrofurantoin (Nephrotoxicity)      Objective:  Vitals:   T(F): 97.8 (05-02-22 @ 11:47), Max: 98 (05-01-22 @ 19:11)  HR: 91 (05-02-22 @ 12:55) (77 - 98)  BP: 116/66 (05-02-22 @ 12:55) (105/61 - 136/57)  RR: 18 (05-02-22 @ 11:47) (17 - 18)  SpO2: 97% (05-02-22 @ 11:47) (96% - 97%)  Physical Examination:  General: no acute distress  HEENT: anicteric, no frontal, maxillary or ethmoid sinus tenderness   Cardio: S1, S2 present, normal rate  Resp: clear to auscultation anteriorly  Abd: soft, ND, NT  Ext: no LE edema  Skin: warm, dry, no visible rash   Lines:    Laboratory Studies:  CBC:                       9.8    14.26 )-----------( 373      ( 02 May 2022 06:28 )             33.9     WBC Trend:  14.26 05-02-22 @ 06:28  16.22 05-01-22 @ 07:29  12.06 04-30-22 @ 07:06  15.61 04-29-22 @ 07:22  10.33 04-28-22 @ 07:05  10.26 04-27-22 @ 07:02  10.46 04-26-22 @ 05:54    CMP: 05-02    133<L>  |  94<L>  |  30<H>  ----------------------------<  178<H>  4.4   |  22  |  6.37<H>    Ca    9.1      02 May 2022 06:28              Microbiology: reviewed     Culture - Blood (collected 04-30-22 @ 16:21)  Source: .Blood Blood-Peripheral  Preliminary Report (05-01-22 @ 17:01):    No growth to date.    Culture - Blood (collected 04-30-22 @ 16:21)  Source: .Blood Blood-Peripheral  Preliminary Report (05-01-22 @ 17:01):    No growth to date.      Radiology: reviewed     Medications:  allopurinol 100 milliGRAM(s) Oral daily  apixaban 5 milliGRAM(s) Oral two times a day  aspirin enteric coated 81 milliGRAM(s) Oral daily  atorvastatin 40 milliGRAM(s) Oral at bedtime  bisacodyl 5 milliGRAM(s) Oral every 12 hours PRN  chlorhexidine 2% Cloths 1 Application(s) Topical <User Schedule>  chlorhexidine 4% Liquid 1 Application(s) Topical <User Schedule>  dextrose 50% Injectable 25 Gram(s) IV Push once  dextrose 50% Injectable 25 Gram(s) IV Push once  dextrose 50% Injectable 12.5 Gram(s) IV Push once  dextrose Oral Gel 15 Gram(s) Oral once PRN  diltiazem    milliGRAM(s) Oral daily  hydrALAZINE 25 milliGRAM(s) Oral three times a day  insulin lispro (ADMELOG) corrective regimen sliding scale   SubCutaneous three times a day before meals  insulin lispro (ADMELOG) corrective regimen sliding scale   SubCutaneous at bedtime  lidocaine   4% Patch 1 Patch Transdermal daily  metoprolol succinate  milliGRAM(s) Oral daily  polyethylene glycol 3350 17 Gram(s) Oral two times a day  povidone iodine 10% Solution 1 Application(s) Topical daily  senna 2 Tablet(s) Oral at bedtime  sevelamer carbonate 1600 milliGRAM(s) Oral three times a day with meals  sodium chloride 0.9% lock flush 10 milliLiter(s) IV Push every 1 hour PRN    Antimicrobials:

## 2022-05-02 NOTE — PROGRESS NOTE ADULT - ASSESSMENT
67 year old gentleman with PMH of CAD, NSTEMI s/p 3 stents in 2014 (stents to LAD, proximal and distal LCx), ischemic cardiomyopathy, HTN for 10 years, T2DM for 26 years c/b peripheral neuropathy, CVA, TIA, Afib on Pradaxa, chronic RLE wound, L carotid stenosis s/p endarterectomy (2014) just recently admitted  to the [facility] on 2/19/2022 with acute onset chest pain for one day found to have an NSTEMI, CAD, s/p PCI in setting of increased AF rates off bb for 3 days and resolved chest pain, his CKMB peaked and Echo noted with EF 60%,normal LV function, mild TR, mild NV. He was s/p [facility] with 85% proximal LAD s/p balloon angioplasty and LULU. He presented to [facility] ED with decreased urine output over the week. [facility]. Stevens went [facility] for [facility]uria and was started  on Nitrofurantoin. Pt is still taking Nitrofurantoin w/ 5 days left. He came to the ED due to worsening symptoms. Pt reports having a similar incident 4-5 years ago that resolved spontaneously. He reports increased leg edema Dyspnea worse on exertion. his baseline Serum creatinine is in the 2.0 to 2.3 mg/dl range. ANT with serum creatinine of 8.29 with bun 144 and k 5.5        1- ANT on ckd III ---> ESRD likely   2- chf chronic   3- hyperphosphatemia /shpt   4- anemia   5- hyperlipidemia   6- HTN /a fib  7- TIA hx       no hd today   renvela 2 tab with meals   anemia - epogen 16007 Units TIW with HD.  PT  leukocytosis    cx NGTD   ID following  ?low dose seroquel at night  hydralazine 25 mg tid

## 2022-05-02 NOTE — PROGRESS NOTE ADULT - SUBJECTIVE AND OBJECTIVE BOX
CARDIOLOGY FOLLOW UP - Dr. Mazariegos  DATE OF SERVICE: 5/2/22     CC no cp or sob        REVIEW OF SYSTEMS:  CONSTITUTIONAL: No fever, weight loss, or fatigue  RESPIRATORY: No cough, wheezing, chills or hemoptysis; No Shortness of Breath  CARDIOVASCULAR: No chest pain, palpitations, passing out, dizziness, or leg swelling  GASTROINTESTINAL: No abdominal or epigastric pain. No nausea, vomiting, or hematemesis; No diarrhea or constipation. No melena or hematochezia.  VASCULAR: No edema     PHYSICAL EXAM:  T(C): 36.6 (05-02-22 @ 11:47), Max: 36.7 (05-01-22 @ 19:11)  HR: 91 (05-02-22 @ 12:55) (77 - 98)  BP: 116/66 (05-02-22 @ 12:55) (105/61 - 136/57)  RR: 18 (05-02-22 @ 11:47) (17 - 18)  SpO2: 97% (05-02-22 @ 11:47) (96% - 97%)  Wt(kg): --  I&O's Summary    01 May 2022 07:01  -  02 May 2022 07:00  --------------------------------------------------------  IN: 250 mL / OUT: 0 mL / NET: 250 mL        Appearance: Normal	  Cardiovascular: Normal S1 S2, irreg   Respiratory: Lungs clear to auscultation	  Gastrointestinal:  Soft, Non-tender, + BS	  Extremities: Normal range of motion, No clubbing, cyanosis or edema      Home Medications:  allopurinol 100 mg oral tablet: 1 tab(s) orally once a day (03 Apr 2022 06:34)  aspirin 81 mg oral delayed release tablet: 1 tab(s) orally once a day (03 Apr 2022 06:34)  bumetanide 2 mg oral tablet: 1 tab(s) orally once a day (03 Apr 2022 06:34)  insulin glargine 100 units/mL subcutaneous solution: 25 unit(s) subcutaneous once a day (at bedtime) (03 Apr 2022 06:34)  metOLazone 5 mg oral tablet: 1 tab(s) orally 3 times a week (03 Apr 2022 06:34)  metoprolol succinate 50 mg oral tablet, extended release: 2 tab(s) orally once a day (03 Apr 2022 06:34)  NovoLOG 100 units/mL subcutaneous solution: subcutaneous 4 times a day (before meals and at bedtime) (03 Apr 2022 06:34)  NovoLOG FlexPen 100 units/mL injectable solution: 10 unit(s) subcutaneous 3 times a day (03 Apr 2022 06:34)  oxycodone-acetaminophen 5 mg-325 mg oral tablet: 1 tab(s) orally 2 times a day (03 Apr 2022 06:34)  Pradaxa 150 mg oral capsule: 1 cap(s) orally 2 times a day (03 Apr 2022 06:34)  pregabalin 100 mg oral capsule: 1 cap(s) orally 3 times a day (03 Apr 2022 06:34)      MEDICATIONS  (STANDING):  allopurinol 100 milliGRAM(s) Oral daily  apixaban 5 milliGRAM(s) Oral two times a day  aspirin enteric coated 81 milliGRAM(s) Oral daily  atorvastatin 40 milliGRAM(s) Oral at bedtime  chlorhexidine 2% Cloths 1 Application(s) Topical <User Schedule>  chlorhexidine 4% Liquid 1 Application(s) Topical <User Schedule>  dextrose 50% Injectable 25 Gram(s) IV Push once  dextrose 50% Injectable 25 Gram(s) IV Push once  dextrose 50% Injectable 12.5 Gram(s) IV Push once  diltiazem    milliGRAM(s) Oral daily  hydrALAZINE 25 milliGRAM(s) Oral three times a day  insulin lispro (ADMELOG) corrective regimen sliding scale   SubCutaneous three times a day before meals  insulin lispro (ADMELOG) corrective regimen sliding scale   SubCutaneous at bedtime  lidocaine   4% Patch 1 Patch Transdermal daily  metoprolol succinate  milliGRAM(s) Oral daily  polyethylene glycol 3350 17 Gram(s) Oral two times a day  povidone iodine 10% Solution 1 Application(s) Topical daily  senna 2 Tablet(s) Oral at bedtime  sevelamer carbonate 1600 milliGRAM(s) Oral three times a day with meals      TELEMETRY: afib hr 80s 	    ECG:  	  RADIOLOGY:   DIAGNOSTIC TESTING:  [ ] Echocardiogram:  [ ]  Catheterization:  [ ] Stress Test:    OTHER: 	    LABS:	 	                            9.8    14.26 )-----------( 373      ( 02 May 2022 06:28 )             33.9     05-02    133<L>  |  94<L>  |  30<H>  ----------------------------<  178<H>  4.4   |  22  |  6.37<H>    Ca    9.1      02 May 2022 06:28

## 2022-05-02 NOTE — PROGRESS NOTE ADULT - SUBJECTIVE AND OBJECTIVE BOX
[facility]   [phone]  RENAL FOLLOW UP NOTE  --------------------------------------------------------------------------------  Chief Complaint:    24 hour events/subjective:    Patient seen and examined   denies fang duncan    PAST HISTORY  --------------------------------------------------------------------------------  No significant changes to PMH, PSH, FHx, SHx, unless otherwise noted    ALLERGIES & MEDICATIONS  --------------------------------------------------------------------------------  Allergies    nitrofurantoin (Nephrotoxicity)    Intolerances      Standing Inpatient Medications  allopurinol 100 milliGRAM(s) Oral daily  apixaban 5 milliGRAM(s) Oral two times a day  aspirin enteric coated 81 milliGRAM(s) Oral daily  atorvastatin 40 milliGRAM(s) Oral at bedtime  chlorhexidine 2% Cloths 1 Application(s) Topical <User Schedule>  chlorhexidine 4% Liquid 1 Application(s) Topical <User Schedule>  dextrose 50% Injectable 25 Gram(s) IV Push once  dextrose 50% Injectable 25 Gram(s) IV Push once  dextrose 50% Injectable 12.5 Gram(s) IV Push once  diltiazem    milliGRAM(s) Oral daily  hydrALAZINE 25 milliGRAM(s) Oral three times a day  insulin lispro (ADMELOG) corrective regimen sliding scale   SubCutaneous at bedtime  insulin lispro (ADMELOG) corrective regimen sliding scale   SubCutaneous three times a day before meals  lidocaine   4% Patch 1 Patch Transdermal daily  metoprolol succinate  milliGRAM(s) Oral daily  polyethylene glycol 3350 17 Gram(s) Oral two times a day  povidone iodine 10% Solution 1 Application(s) Topical daily  senna 2 Tablet(s) Oral at bedtime  sevelamer carbonate 1600 milliGRAM(s) Oral three times a day with meals    PRN Inpatient Medications  bisacodyl 5 milliGRAM(s) Oral every 12 hours PRN  dextrose Oral Gel 15 Gram(s) Oral once PRN  sodium chloride 0.9% lock flush 10 milliLiter(s) IV Push every 1 hour PRN      REVIEW OF SYSTEMS  --------------------------------------------------------------------------------    Gen: denies fevers/chills,  CVS: denies chest pain/palpitations  Resp: denies SOB/Cough  GI: Denies N/V/Abd pain  : Denies dysuria    VITALS/PHYSICAL EXAM  --------------------------------------------------------------------------------  T(C): 36.6 (05-02-22 @ 11:47), Max: 36.7 (05-01-22 @ 19:11)  HR: 77 (05-02-22 @ 11:47) (77 - 98)  BP: 119/54 (05-02-22 @ 11:47) (105/61 - 136/57)  RR: 18 (05-02-22 @ 11:47) (17 - 18)  SpO2: 97% (05-02-22 @ 11:47) (96% - 97%)  Wt(kg): --        05-01-22 @ 07:01  -  05-02-22 @ 07:00  --------------------------------------------------------  IN: 250 mL / OUT: 0 mL / NET: 250 mL      Physical Exam:  	              Gen: Appears comfortable, awake, alert  	Pulm: Decreased breath sounds b/l bases.  	CV: No JVD. IRR,   	Abd: +BS, soft, nontender, softly distended, obese               Extremity UE: increased warmth, R hand, increased warmth, swollen              Extremity LE: + hyperpigmented bilateral LE with B/L  no edema              Vascular: R IJ HD catheter, L arm AVF     LABS/STUDIES  --------------------------------------------------------------------------------              9.8    14.26 >-----------<  373      [05-02-22 @ 06:28]              33.9     133  |  94  |  30  ----------------------------<  178      [05-02-22 @ 06:28]  4.4   |  22  |  6.37        Ca     9.1     [05-02-22 @ 06:28]            Creatinine Trend:  SCr 6.37 [05-02 @ 06:28]  SCr 6.92 [04-30 @ 07:06]  SCr 5.35 [04-29 @ 07:23]  SCr 8.09 [04-28 @ 07:10]  SCr 6.81 [04-27 @ 07:02]              Urinalysis - [04-03-22 @ 00:48]      Color Light Orange / Appearance Turbid / SG 1.021 / pH 5.5      Gluc Negative / Ketone Negative  / Bili Negative / Urobili Negative       Blood Large / Protein 300 mg/dL / Leuk Est Large / Nitrite Negative      RBC 20 /  / Hyaline 10 / Gran  / Sq Epi  / Non Sq Epi 3 / Bacteria Negative      Iron 16, TIBC 239, %sat 7      [04-15-22 @ 10:16]  Ferritin 172      [04-15-22 @ 09:05]  PTH -- (Ca 8.3)      [04-15-22 @ 12:18]   150  PTH -- (Ca --)      [04-03-22 @ 23:06]   97  HbA1c 11.7      [11-07-19 @ 09:14]  TSH 1.98      [04-05-22 @ 05:19]    DEREJE: titer Negative, pattern Negative      [04-11-22 @ 16:24]  C3 Complement 61      [04-11-22 @ 16:56]  C4 Complement 28      [04-11-22 @ 16:56]  ANCA: cANCA Negative, pANCA Negative, atypical ANCA Indeterminate Method interference due to DEREJE fluorescence      [04-11-22 @ 16:24]  anti-GBM 3      [04-11-22 @ 19:27]  Free Light Chains: kappa 16.92, lambda 12.63, ratio = 1.34      [04-11 @ 16:56]  Immunofixation Serum: No Monoclonal Band Identified    Reference Range: None Detected      [04-11-22 @ 16:56]  SPEP Interpretation: Normal Electrophoresis Pattern      [04-11-22 @ 16:56]

## 2022-05-02 NOTE — PROGRESS NOTE ADULT - SUBJECTIVE AND OBJECTIVE BOX
Follow-up Pulm Progress Note    No new respiratory events overnight.  Denies SOB/CP.     Medications:  MEDICATIONS  (STANDING):  allopurinol 100 milliGRAM(s) Oral daily  apixaban 5 milliGRAM(s) Oral two times a day  aspirin enteric coated 81 milliGRAM(s) Oral daily  atorvastatin 40 milliGRAM(s) Oral at bedtime  chlorhexidine 2% Cloths 1 Application(s) Topical <User Schedule>  chlorhexidine 4% Liquid 1 Application(s) Topical <User Schedule>  dextrose 50% Injectable 25 Gram(s) IV Push once  dextrose 50% Injectable 25 Gram(s) IV Push once  dextrose 50% Injectable 12.5 Gram(s) IV Push once  diltiazem    milliGRAM(s) Oral daily  hydrALAZINE 25 milliGRAM(s) Oral three times a day  insulin lispro (ADMELOG) corrective regimen sliding scale   SubCutaneous at bedtime  insulin lispro (ADMELOG) corrective regimen sliding scale   SubCutaneous three times a day before meals  lidocaine   4% Patch 1 Patch Transdermal daily  metoprolol succinate  milliGRAM(s) Oral daily  polyethylene glycol 3350 17 Gram(s) Oral two times a day  povidone iodine 10% Solution 1 Application(s) Topical daily  senna 2 Tablet(s) Oral at bedtime  sevelamer carbonate 1600 milliGRAM(s) Oral three times a day with meals    MEDICATIONS  (PRN):  bisacodyl 5 milliGRAM(s) Oral every 12 hours PRN Constipation  dextrose Oral Gel 15 Gram(s) Oral once PRN Blood Glucose LESS THAN 70 milliGRAM(s)/deciliter  sodium chloride 0.9% lock flush 10 milliLiter(s) IV Push every 1 hour PRN Pre/post blood products, medications, blood draw, and to maintain line patency          Vital Signs Last 24 Hrs  T(C): 36.4 (02 May 2022 04:11), Max: 36.7 (01 May 2022 19:11)  T(F): 97.5 (02 May 2022 04:11), Max: 98 (01 May 2022 19:11)  HR: 87 (02 May 2022 04:11) (78 - 98)  BP: 133/65 (02 May 2022 04:11) (105/61 - 155/68)  BP(mean): --  RR: 18 (02 May 2022 04:11) (16 - 18)  SpO2: 96% (02 May 2022 04:11) (96% - 97%)          05-01 @ 07:01  -  05-02 @ 07:00  --------------------------------------------------------  IN: 250 mL / OUT: 0 mL / NET: 250 mL          LABS:                        9.8    14.26 )-----------( 373      ( 02 May 2022 06:28 )             33.9     05-02    133<L>  |  94<L>  |  30<H>  ----------------------------<  178<H>  4.4   |  22  |  6.37<H>    Ca    9.1      02 May 2022 06:28            CAPILLARY BLOOD GLUCOSE      POCT Blood Glucose.: 189 mg/dL (02 May 2022 09:17)            DEREJE Negative 04-11 @ 16:24  Anti SS-1 --  Anti SS-2 --  Anti RNP --  RF -- 04-11 @ 16:24    Atypical ANCA Indeterminate Method interference due to DEREJE fluorescence 04-11 @ 16:24  c-ANCA titer -- 04-11 @ 16:24  c-ANCA Negative 04-11 @ 16:24  p-ANCA Negative 04-11 @ 16:24              CULTURES:     Culture - Blood (collected 04-30-22 @ 16:21)  Source: .Blood Blood-Peripheral  Preliminary Report (05-01-22 @ 17:01):    No growth to date.    Culture - Blood (collected 04-30-22 @ 16:21)  Source: .Blood Blood-Peripheral  Preliminary Report (05-01-22 @ 17:01):    No growth to date.          Physical Examination:  PULM: Clear to auscultation bilaterally, no significant sputum production  CVS: S1, S2 heard      RADIOLOGY REVIEWED  CXR 4/29: small L pl effusion/atelectasis     CT chest: < from: CT Chest No Cont (04.04.22 @ 16:52) >  FINDINGS:    AIRWAYS, LUNGS, PLEURA: Tracheal secretions. Small left greater than   right pleural effusions increased compared to 2/19/2022. Right-sided   pleural thickening. Lingular and left greater than right basilar   opacification, likely atelectasis.    MEDIASTINUM: Cardiomegaly. No pericardial effusion. Thoracic aorta normal   caliber.  No large mediastinal lymph nodes.    IMAGED ABDOMEN: Nonspecific perinephric stranding.    SOFT TISSUES: Unremarkable.    BONES: Unremarkable.      IMPRESSION:.    Small left greater than right bilateral pleural effusions increased in   size compared to 2/19/2022.    Lingular and left greater than right basilar opacification, likely   atelectasis.    --- End of Report ---    < end of copied text >

## 2022-05-02 NOTE — PROGRESS NOTE ADULT - ASSESSMENT
·  Problem: Acute kidney injury superimposed on CKD. pt currently on hd  to cont as per renal  avf done    perm cath done     Problem/Plan - 2:  ·  Problem: Chronic atrial fibrillation. c/w a/c  cards f/u     Problem/Plan - 3:  ·  Problem: Cystitis.   ID follow up appreciated   to 15 again today  ID follow up requested   cultures negative so far    CXR no pneumonia      Problem/Plan - 4:  ·  Problem: Type 2 diabetes mellitus. c/w insulin   endo f/u      Problem/Plan - 5:  ·  Problem: CAD (coronary artery disease).   stable  cards f/u     constipation   bowel regimen      gout pain  control    rehab planning    discussed with patient's son  at bedside in detail

## 2022-05-02 NOTE — PROGRESS NOTE ADULT - SUBJECTIVE AND OBJECTIVE BOX
DATE OF SERVICE: 05-02-22 @ 13:29  CHIEF COMPLAINT:Patient is a 67y old  Male who presents with a chief complaint of Decreased urine output for the past week, leg oedema (02 May 2022 12:45)    	        PAST MEDICAL & SURGICAL HISTORY:  HTN (hypertension), benign    HLD (hyperlipidemia)    DM type 2 (diabetes mellitus, type 2)    TIA (transient ischemic attack)    Atrial fibrillation    MI (myocardial infarction)  circa 2014 and 2022.    CAD (coronary artery disease)    Neuropathy    Stage 3 chronic kidney disease    2019 novel coronavirus disease (COVID-19)  12/2021.  Received the COVID-19 vaccine x 2 as of April 2022.    Status post angioplasty with stent  LULU x 3 2/7/2014    S/P carotid endarterectomy  left            feels much better  RESPIRATORY: No cough, wheezing, chills or hemoptysis; No Shortness of Breath  CARDIOVASCULAR: No chest pain, palpitations, passing out, dizziness, or leg swelling  GASTROINTESTINAL: No abdominal or epigastric pain. No nausea, vomiting, or hematemesis; No diarrhea or constipation. No melena or hematochezia.  GENITOURINARY: No dysuria, frequency, hematuria, or incontinence  NEUROLOGICAL: No headaches,    Medications:  MEDICATIONS  (STANDING):  allopurinol 100 milliGRAM(s) Oral daily  apixaban 5 milliGRAM(s) Oral two times a day  aspirin enteric coated 81 milliGRAM(s) Oral daily  atorvastatin 40 milliGRAM(s) Oral at bedtime  chlorhexidine 2% Cloths 1 Application(s) Topical <User Schedule>  chlorhexidine 4% Liquid 1 Application(s) Topical <User Schedule>  dextrose 50% Injectable 25 Gram(s) IV Push once  dextrose 50% Injectable 25 Gram(s) IV Push once  dextrose 50% Injectable 12.5 Gram(s) IV Push once  diltiazem    milliGRAM(s) Oral daily  hydrALAZINE 25 milliGRAM(s) Oral three times a day  insulin lispro (ADMELOG) corrective regimen sliding scale   SubCutaneous three times a day before meals  insulin lispro (ADMELOG) corrective regimen sliding scale   SubCutaneous at bedtime  lidocaine   4% Patch 1 Patch Transdermal daily  metoprolol succinate  milliGRAM(s) Oral daily  polyethylene glycol 3350 17 Gram(s) Oral two times a day  povidone iodine 10% Solution 1 Application(s) Topical daily  senna 2 Tablet(s) Oral at bedtime  sevelamer carbonate 1600 milliGRAM(s) Oral three times a day with meals    MEDICATIONS  (PRN):  bisacodyl 5 milliGRAM(s) Oral every 12 hours PRN Constipation  dextrose Oral Gel 15 Gram(s) Oral once PRN Blood Glucose LESS THAN 70 milliGRAM(s)/deciliter  sodium chloride 0.9% lock flush 10 milliLiter(s) IV Push every 1 hour PRN Pre/post blood products, medications, blood draw, and to maintain line patency    	    PHYSICAL EXAM:  T(C): 36.6 (05-02-22 @ 11:47), Max: 36.7 (05-01-22 @ 19:11)  HR: 91 (05-02-22 @ 12:55) (77 - 98)  BP: 116/66 (05-02-22 @ 12:55) (105/61 - 136/57)  RR: 18 (05-02-22 @ 11:47) (17 - 18)  SpO2: 97% (05-02-22 @ 11:47) (96% - 97%)  Wt(kg): --  I&O's Summary    01 May 2022 07:01  -  02 May 2022 07:00  --------------------------------------------------------  IN: 250 mL / OUT: 0 mL / NET: 250 mL        Appearance: Normal	  HEENT:   Normal oral mucosa, PERRL, EOMI	    Cardiovascular: Normal S1 S2, No JVD,   Respiratory: Lungs clear to auscultation	  Psychiatry: A & O   Gastrointestinal:  Soft, Non-tender, + BS		  Neurologic: Non-focal  Extremities: pvd    TELEMETRY: 	    ECG:  	  RADIOLOGY:  OTHER: 	  	  LABS:	 	    CARDIAC MARKERS:                                9.8    14.26 )-----------( 373      ( 02 May 2022 06:28 )             33.9     05-02    133<L>  |  94<L>  |  30<H>  ----------------------------<  178<H>  4.4   |  22  |  6.37<H>    Ca    9.1      02 May 2022 06:28      proBNP:   Lipid Profile:   HgA1c:   TSH:

## 2022-05-03 NOTE — PROGRESS NOTE ADULT - ASSESSMENT
·  Problem: Acute kidney injury superimposed on CKD. pt currently on hd  to cont as per renal  avf done    perm cath done     Problem/Plan - 2:  ·  Problem: Chronic atrial fibrillation. c/w a/c  cards f/u     Problem/Plan - 3:  ·  Problem: Cystitis.   ID follow up appreciated     cultures negative so far    CXR no pneumonia      Problem/Plan - 4:  ·  Problem: Type 2 diabetes mellitus. c/w insulin   endo f/u      Problem/Plan - 5:  ·  Problem: CAD (coronary artery disease).   stable  cards f/u     constipation   bowel regimen      gout pain better  control    rehab planning

## 2022-05-03 NOTE — PROGRESS NOTE ADULT - SUBJECTIVE AND OBJECTIVE BOX
CARDIOLOGY FOLLOW UP - Dr. Mazariegos  DATE OF SERVICE: 5/3/22     CC no cp or sob  ref med this am       REVIEW OF SYSTEMS:  CONSTITUTIONAL: No fever, weight loss, or fatigue  RESPIRATORY: No cough, wheezing, chills or hemoptysis; No Shortness of Breath  CARDIOVASCULAR: No chest pain, palpitations, passing out, dizziness, or leg swelling  GASTROINTESTINAL: No abdominal or epigastric pain. No nausea, vomiting, or hematemesis; No diarrhea or constipation. No melena or hematochezia.      PHYSICAL EXAM:  T(C): 36.5 (05-03-22 @ 11:08), Max: 36.6 (05-02-22 @ 21:13)  HR: 76 (05-03-22 @ 12:28) (76 - 109)  BP: 122/66 (05-03-22 @ 12:28) (122/66 - 166/61)  RR: 18 (05-03-22 @ 11:08) (16 - 18)  SpO2: 97% (05-03-22 @ 12:28) (96% - 99%)  Wt(kg): --  I&O's Summary    02 May 2022 07:01  -  03 May 2022 07:00  --------------------------------------------------------  IN: 120 mL / OUT: 0 mL / NET: 120 mL    03 May 2022 07:01  -  03 May 2022 13:44  --------------------------------------------------------  IN: 100 mL / OUT: 0 mL / NET: 100 mL        Appearance: Normal	  Cardiovascular: Normal S1 S2, irreg   Respiratory: Lungs clear to auscultation	  Gastrointestinal:  Soft, Non-tender, + BS	  Extremities: Normal range of motion, No clubbing, cyanosis or edema      Home Medications:  allopurinol 100 mg oral tablet: 1 tab(s) orally once a day (03 Apr 2022 06:34)  aspirin 81 mg oral delayed release tablet: 1 tab(s) orally once a day (03 Apr 2022 06:34)  bumetanide 2 mg oral tablet: 1 tab(s) orally once a day (03 Apr 2022 06:34)  insulin glargine 100 units/mL subcutaneous solution: 25 unit(s) subcutaneous once a day (at bedtime) (03 Apr 2022 06:34)  metOLazone 5 mg oral tablet: 1 tab(s) orally 3 times a week (03 Apr 2022 06:34)  metoprolol succinate 50 mg oral tablet, extended release: 2 tab(s) orally once a day (03 Apr 2022 06:34)  NovoLOG 100 units/mL subcutaneous solution: subcutaneous 4 times a day (before meals and at bedtime) (03 Apr 2022 06:34)  NovoLOG FlexPen 100 units/mL injectable solution: 10 unit(s) subcutaneous 3 times a day (03 Apr 2022 06:34)  oxycodone-acetaminophen 5 mg-325 mg oral tablet: 1 tab(s) orally 2 times a day (03 Apr 2022 06:34)  Pradaxa 150 mg oral capsule: 1 cap(s) orally 2 times a day (03 Apr 2022 06:34)  pregabalin 100 mg oral capsule: 1 cap(s) orally 3 times a day (03 Apr 2022 06:34)      MEDICATIONS  (STANDING):  allopurinol 100 milliGRAM(s) Oral daily  apixaban 5 milliGRAM(s) Oral two times a day  aspirin enteric coated 81 milliGRAM(s) Oral daily  atorvastatin 40 milliGRAM(s) Oral at bedtime  chlorhexidine 2% Cloths 1 Application(s) Topical <User Schedule>  chlorhexidine 4% Liquid 1 Application(s) Topical <User Schedule>  dextrose 50% Injectable 25 Gram(s) IV Push once  dextrose 50% Injectable 25 Gram(s) IV Push once  dextrose 50% Injectable 12.5 Gram(s) IV Push once  diltiazem    milliGRAM(s) Oral daily  epoetin naz-epbx (RETACRIT) Injectable 74036 Unit(s) IV Push once  hydrALAZINE 25 milliGRAM(s) Oral three times a day  insulin lispro (ADMELOG) corrective regimen sliding scale   SubCutaneous three times a day before meals  insulin lispro (ADMELOG) corrective regimen sliding scale   SubCutaneous at bedtime  lidocaine   4% Patch 1 Patch Transdermal daily  metoprolol succinate  milliGRAM(s) Oral daily  polyethylene glycol 3350 17 Gram(s) Oral two times a day  povidone iodine 10% Solution 1 Application(s) Topical daily  senna 2 Tablet(s) Oral at bedtime  sevelamer carbonate 1600 milliGRAM(s) Oral three times a day with meals      TELEMETRY: 	    ECG:  	  RADIOLOGY:   DIAGNOSTIC TESTING:  [ ] Echocardiogram:  [ ]  Catheterization:  [ ] Stress Test:    OTHER: 	    LABS:	 	                            10.0   13.28 )-----------( 308      ( 03 May 2022 02:01 )             33.9     05-03    132<L>  |  93<L>  |  41<H>  ----------------------------<  174<H>  4.7   |  23  |  7.97<H>    Ca    9.1      03 May 2022 02:01  Phos  3.9     05-03  Mg     2.6     05-03

## 2022-05-03 NOTE — PROGRESS NOTE ADULT - ASSESSMENT
Patient is a 67 year old male with PMH of CAD with past multiple PCI, with most recent discharge from [facility] in Feb 2022 for NSTEMI with LULU of an 85% prox LAD lesion with patient on ASA and now off Plavix per cardiology (only for one month), chronic afib on Pradaxa, HTN, type 2 DM on insulin, diabetic neuropathy with chronic RIGHT LE venous stasis changes>left, LEFT foot ulcer seen by podiatry, past endarterectomy LEFT CEA in 2014 who presented with UTI with hematuria diagnosed at urgent care and now with decreased urine output.    4/18 for LUE AVF creation, possible graft placement.    Leukocytosis  pt has had fluctuations throughout hospital course  all cultures remain NGTD including 4/30  monitor stool output and consistency  CXR w/o evidence of focal consolidation and pt w/o respiratory symptoms  pt no longer makes urine, denies abd pain or suprapubic tenderness  no tenderness or erythema at IV sites  no frontal, maxillary or ethmoid sinus tenderness on exam  pt w/ poor dentitions  continue to monitor off antibiotics  leukocytosis slowly improving  no objection to discharge from ID standpoint    Acute cystitis  S/p ceftriaxone x7 day course completed 4/9    ANT on CKD3  Nephrology following, appreciate recs  s/p placement of shiley and initiation of HD; s/p permcath placement 4/20  monitor Cr   renally dose meds   Avoid nephrotoxic agents    B/l LE edema with chronic venous stasis  Acute on chronic HFpEF  legs cool to touch, nontender, chronic changes with no sign of infection/cellulitis  has wound on bottom of L foot- evaluated by team last week- dry and does not appear infected either  attempted to evaluate wound, however, patient refused exam     DM2 with neuropathy  Blood glucose management per primary team.    Eugene Meeks M.D.  [facility], Division of Infectious Diseases  [phone]  After 5pm on weekdays and all day on weekends - please call [phone]

## 2022-05-03 NOTE — PROVIDER CONTACT NOTE (OTHER) - RECOMMENDATIONS
Pt. educated on importance of medication by RN and NP at bedside. Pt. stating "I DON'T CARE" over and over as his response.

## 2022-05-03 NOTE — PROGRESS NOTE ADULT - SUBJECTIVE AND OBJECTIVE BOX
[facility], Division of Infectious Diseases  ADRIANA Seals Y. Patel, S. Shah, G. Casimir  [phone]  ([phone] - weekdays after 5pm and weekends)    Name: DYLAN DEL CASTILLO  Age/Gender: 67y Male  MRN: [id number]    Interval History:  Patient seen this morning.   Notes reviewed.   No concerning overnight events.  Afebrile.   leukocytosis slightly improved  pt states his foot wound is not infected    Allergies: nitrofurantoin (Nephrotoxicity)      Objective:  Vitals:   T(F): 97.7 (05-03-22 @ 11:08), Max: 97.9 (05-02-22 @ 21:13)  HR: 76 (05-03-22 @ 12:28) (76 - 109)  BP: 122/66 (05-03-22 @ 12:28) (122/66 - 166/61)  RR: 18 (05-03-22 @ 11:08) (16 - 18)  SpO2: 97% (05-03-22 @ 12:28) (96% - 99%)  Physical Examination:  General: no acute distress  HEENT: anicteric  Cardio: S1, S2 present, normal rate  Resp: clear to auscultation anteriorly  Abd: soft, ND, NT  Ext: no LE edema  Skin: warm, dry, L foot wrapped w/ dressing  Lines:    Laboratory Studies:  CBC:                       10.0   13.28 )-----------( 308      ( 03 May 2022 02:01 )             33.9     WBC Trend:  13.28 05-03-22 @ 02:01  14.26 05-02-22 @ 06:28  16.22 05-01-22 @ 07:29  12.06 04-30-22 @ 07:06  15.61 04-29-22 @ 07:22  10.33 04-28-22 @ 07:05  10.26 04-27-22 @ 07:02    CMP: 05-03    132<L>  |  93<L>  |  41<H>  ----------------------------<  174<H>  4.7   |  23  |  7.97<H>    Ca    9.1      03 May 2022 02:01  Phos  3.9     05-03  Mg     2.6     05-03              Microbiology: reviewed     Culture - Blood (collected 04-30-22 @ 16:21)  Source: .Blood Blood-Peripheral  Preliminary Report (05-01-22 @ 17:01):    No growth to date.    Culture - Blood (collected 04-30-22 @ 16:21)  Source: .Blood Blood-Peripheral  Preliminary Report (05-01-22 @ 17:01):    No growth to date.      Radiology: reviewed     Medications:  allopurinol 100 milliGRAM(s) Oral daily  apixaban 5 milliGRAM(s) Oral two times a day  aspirin enteric coated 81 milliGRAM(s) Oral daily  atorvastatin 40 milliGRAM(s) Oral at bedtime  bisacodyl 5 milliGRAM(s) Oral every 12 hours PRN  chlorhexidine 2% Cloths 1 Application(s) Topical <User Schedule>  chlorhexidine 4% Liquid 1 Application(s) Topical <User Schedule>  dextrose 50% Injectable 25 Gram(s) IV Push once  dextrose 50% Injectable 25 Gram(s) IV Push once  dextrose 50% Injectable 12.5 Gram(s) IV Push once  dextrose Oral Gel 15 Gram(s) Oral once PRN  diltiazem    milliGRAM(s) Oral daily  epoetin naz-epbx (RETACRIT) Injectable 85412 Unit(s) IV Push once  hydrALAZINE 25 milliGRAM(s) Oral three times a day  insulin lispro (ADMELOG) corrective regimen sliding scale   SubCutaneous three times a day before meals  insulin lispro (ADMELOG) corrective regimen sliding scale   SubCutaneous at bedtime  lidocaine   4% Patch 1 Patch Transdermal daily  metoprolol succinate  milliGRAM(s) Oral daily  polyethylene glycol 3350 17 Gram(s) Oral two times a day  povidone iodine 10% Solution 1 Application(s) Topical daily  QUEtiapine 25 milliGRAM(s) Oral at bedtime PRN  senna 2 Tablet(s) Oral at bedtime  sevelamer carbonate 1600 milliGRAM(s) Oral three times a day with meals  sodium chloride 0.9% lock flush 10 milliLiter(s) IV Push every 1 hour PRN    Antimicrobials:

## 2022-05-03 NOTE — PROGRESS NOTE ADULT - ASSESSMENT
A/P    66 y/o M with history of CAD, s/p multiple PCI, with most recent 2/22 PCI of LAD in setting of NSTEMI, on ASA only (pradaxa), chronic atrial fibrillation maintained on Pradaxa, essential HTN, type 2 DM previously on oral medications but on insulin, diabetic neuropathy with chronic RIGHT LE venous stasis changes>left, LEFT foot ulcer seen by podiatry, past endarterectomy LEFT CEA in 2014 presenting with 1 week of decreased urine output, cystitis with hematuria     #ANT on CKD, new HD  -oliguric renal failure in setting of UTI/cystitis  -etiology ??  -New HD for uremia, renal failure  -sp rrt 4/12 for uncontrolled bleeding sp  right groin shiley removal  - monitor h/h - stable  -s/p L AVG 4/18  -s/p permacath placement 4/20  -renal f/ u    #AMS/Lethargy  -recent ams from pain meds  -AMS sp RRT 4/27  likely secondary to pain med   -repeat CT 4/27 unremarkable --  discontinued Percocet- improved   -med f/u     #Acute on chronic HFpEF  -stable  -continue volume removal with HD  -c/w  bb    #Chronic AF  -rates overall stable   -c/w metoprolol succ 100 mg qd  -c/w dilt cd 120mg daily    -c/w eliquis 5 mg BID for now - heme stable     #HTN  -stable  -c/w meds   -increase hydral if bp remains elevated     #CAD, s/p PCI, most recent 2/2022  -stable, cont asa  -off plavix due to a/c indication  -statin     #R carotid stenosis  -cont med tx  -MRA noted with Greater than 75% stenosis of the right distal common carotid artery. Approximately 60% stenosis of the proximal left internal carotid artery.  -Continue ASA and Statin Therapy  -vasc outpt f/u Dr Pinto    dc to rehab

## 2022-05-03 NOTE — PROGRESS NOTE ADULT - SUBJECTIVE AND OBJECTIVE BOX
[facility]   [phone]  RENAL FOLLOW UP NOTE  --------------------------------------------------------------------------------  Chief Complaint:    24 hour events/subjective:    Patient seen and examined.   confused  denies sob  refused AM meds    PAST HISTORY  --------------------------------------------------------------------------------  No significant changes to PMH, PSH, FHx, SHx, unless otherwise noted    ALLERGIES & MEDICATIONS  --------------------------------------------------------------------------------  Allergies    nitrofurantoin (Nephrotoxicity)    Intolerances      Standing Inpatient Medications  allopurinol 100 milliGRAM(s) Oral daily  apixaban 5 milliGRAM(s) Oral two times a day  aspirin enteric coated 81 milliGRAM(s) Oral daily  atorvastatin 40 milliGRAM(s) Oral at bedtime  chlorhexidine 2% Cloths 1 Application(s) Topical <User Schedule>  chlorhexidine 4% Liquid 1 Application(s) Topical <User Schedule>  dextrose 50% Injectable 25 Gram(s) IV Push once  dextrose 50% Injectable 25 Gram(s) IV Push once  dextrose 50% Injectable 12.5 Gram(s) IV Push once  diltiazem    milliGRAM(s) Oral daily  epoetin naz-epbx (RETACRIT) Injectable 88259 Unit(s) IV Push once  hydrALAZINE 25 milliGRAM(s) Oral three times a day  insulin lispro (ADMELOG) corrective regimen sliding scale   SubCutaneous three times a day before meals  insulin lispro (ADMELOG) corrective regimen sliding scale   SubCutaneous at bedtime  lidocaine   4% Patch 1 Patch Transdermal daily  metoprolol succinate  milliGRAM(s) Oral daily  polyethylene glycol 3350 17 Gram(s) Oral two times a day  povidone iodine 10% Solution 1 Application(s) Topical daily  senna 2 Tablet(s) Oral at bedtime  sevelamer carbonate 1600 milliGRAM(s) Oral three times a day with meals    PRN Inpatient Medications  bisacodyl 5 milliGRAM(s) Oral every 12 hours PRN  dextrose Oral Gel 15 Gram(s) Oral once PRN  QUEtiapine 25 milliGRAM(s) Oral at bedtime PRN  sodium chloride 0.9% lock flush 10 milliLiter(s) IV Push every 1 hour PRN      REVIEW OF SYSTEMS  --------------------------------------------------------------------------------    Gen: denies fevers/chills,  CVS: denies chest pain/palpitations  Resp: denies SOB/Cough  GI: Denies N/V/Abd pain  : Denies dysuria    VITALS/PHYSICAL EXAM  --------------------------------------------------------------------------------  T(C): 36.5 (05-03-22 @ 11:08), Max: 36.6 (05-02-22 @ 21:13)  HR: 76 (05-03-22 @ 12:28) (76 - 109)  BP: 122/66 (05-03-22 @ 12:28) (122/66 - 166/61)  RR: 18 (05-03-22 @ 11:08) (16 - 18)  SpO2: 97% (05-03-22 @ 12:28) (96% - 99%)  Wt(kg): --        05-02-22 @ 07:01  -  05-03-22 @ 07:00  --------------------------------------------------------  IN: 120 mL / OUT: 0 mL / NET: 120 mL    05-03-22 @ 07:01  -  05-03-22 @ 15:45  --------------------------------------------------------  IN: 300 mL / OUT: 0 mL / NET: 300 mL      Physical Exam:  	              Gen: Appears comfortable, awake, forgetful  	Pulm: Decreased breath sounds b/l bases.  	CV: No JVD. IRR,   	Abd: +BS, soft, nontender, softly distended, obese               Extremity UE: increased warmth, R hand, increased warmth, swollen              Extremity LE: + hyperpigmented bilateral LE with B/L  no edema              Vascular: R IJ HD catheter, L arm AVF     LABS/STUDIES  --------------------------------------------------------------------------------              10.0   13.28 >-----------<  308      [05-03-22 @ 02:01]              33.9     132  |  93  |  41  ----------------------------<  174      [05-03-22 @ 02:01]  4.7   |  23  |  7.97        Ca     9.1     [05-03-22 @ 02:01]      Mg     2.6     [05-03-22 @ 02:01]      Phos  3.9     [05-03-22 @ 02:01]            Creatinine Trend:  SCr 7.97 [05-03 @ 02:01]  SCr 6.37 [05-02 @ 06:28]  SCr 6.92 [04-30 @ 07:06]  SCr 5.35 [04-29 @ 07:23]  SCr 8.09 [04-28 @ 07:10]                  Iron 16, TIBC 239, %sat 7      [04-15-22 @ 10:16]  Ferritin 172      [04-15-22 @ 09:05]  PTH -- (Ca 8.3)      [04-15-22 @ 12:18]   150  PTH -- (Ca --)      [04-03-22 @ 23:06]   97  HbA1c 11.7      [11-07-19 @ 09:14]  TSH 1.98      [04-05-22 @ 05:19]    DEREJE: titer Negative, pattern Negative      [04-11-22 @ 16:24]  C3 Complement 61      [04-11-22 @ 16:56]  C4 Complement 28      [04-11-22 @ 16:56]  ANCA: cANCA Negative, pANCA Negative, atypical ANCA Indeterminate Method interference due to DEREJE fluorescence      [04-11-22 @ 16:24]  anti-GBM 3      [04-11-22 @ 19:27]  Free Light Chains: kappa 16.92, lambda 12.63, ratio = 1.34      [04-11 @ 16:56]  Immunofixation Serum: No Monoclonal Band Identified    Reference Range: None Detected      [04-11-22 @ 16:56]  SPEP Interpretation: Normal Electrophoresis Pattern      [04-11-22 @ 16:56]

## 2022-05-03 NOTE — PROGRESS NOTE ADULT - SUBJECTIVE AND OBJECTIVE BOX
Follow-up Pulm Progress Note    Seen while working with PT   No new respiratory events overnight.  Denies SOB/CP.     Medications:  MEDICATIONS  (STANDING):  allopurinol 100 milliGRAM(s) Oral daily  apixaban 5 milliGRAM(s) Oral two times a day  aspirin enteric coated 81 milliGRAM(s) Oral daily  atorvastatin 40 milliGRAM(s) Oral at bedtime  chlorhexidine 2% Cloths 1 Application(s) Topical <User Schedule>  chlorhexidine 4% Liquid 1 Application(s) Topical <User Schedule>  dextrose 50% Injectable 25 Gram(s) IV Push once  dextrose 50% Injectable 25 Gram(s) IV Push once  dextrose 50% Injectable 12.5 Gram(s) IV Push once  diltiazem    milliGRAM(s) Oral daily  epoetin naz-epbx (RETACRIT) Injectable 36260 Unit(s) IV Push once  hydrALAZINE 25 milliGRAM(s) Oral three times a day  insulin lispro (ADMELOG) corrective regimen sliding scale   SubCutaneous three times a day before meals  insulin lispro (ADMELOG) corrective regimen sliding scale   SubCutaneous at bedtime  lidocaine   4% Patch 1 Patch Transdermal daily  metoprolol succinate  milliGRAM(s) Oral daily  polyethylene glycol 3350 17 Gram(s) Oral two times a day  povidone iodine 10% Solution 1 Application(s) Topical daily  senna 2 Tablet(s) Oral at bedtime  sevelamer carbonate 1600 milliGRAM(s) Oral three times a day with meals    MEDICATIONS  (PRN):  bisacodyl 5 milliGRAM(s) Oral every 12 hours PRN Constipation  dextrose Oral Gel 15 Gram(s) Oral once PRN Blood Glucose LESS THAN 70 milliGRAM(s)/deciliter  QUEtiapine 25 milliGRAM(s) Oral at bedtime PRN agitation / insomnia  sodium chloride 0.9% lock flush 10 milliLiter(s) IV Push every 1 hour PRN Pre/post blood products, medications, blood draw, and to maintain line patency          Vital Signs Last 24 Hrs  T(C): 36.5 (03 May 2022 11:08), Max: 36.6 (02 May 2022 21:13)  T(F): 97.7 (03 May 2022 11:08), Max: 97.9 (02 May 2022 21:13)  HR: 76 (03 May 2022 12:28) (76 - 109)  BP: 122/66 (03 May 2022 12:28) (116/66 - 166/61)  BP(mean): --  RR: 18 (03 May 2022 11:08) (16 - 18)  SpO2: 97% (03 May 2022 12:28) (96% - 99%)          05-02 @ 07:01  -  05-03 @ 07:00  --------------------------------------------------------  IN: 120 mL / OUT: 0 mL / NET: 120 mL          LABS:                        10.0   13.28 )-----------( 308      ( 03 May 2022 02:01 )             33.9     05-03    132<L>  |  93<L>  |  41<H>  ----------------------------<  174<H>  4.7   |  23  |  7.97<H>    Ca    9.1      03 May 2022 02:01  Phos  3.9     05-03  Mg     2.6     05-03            CAPILLARY BLOOD GLUCOSE      POCT Blood Glucose.: 226 mg/dL (03 May 2022 12:26)            DEREJE Negative 04-11 @ 16:24  Anti SS-1 --  Anti SS-2 --  Anti RNP --  RF -- 04-11 @ 16:24    Atypical ANCA Indeterminate Method interference due to DEREJE fluorescence 04-11 @ 16:24  c-ANCA titer -- 04-11 @ 16:24  c-ANCA Negative 04-11 @ 16:24  p-ANCA Negative 04-11 @ 16:24              CULTURES:     Culture - Blood (collected 04-30-22 @ 16:21)  Source: .Blood Blood-Peripheral  Preliminary Report (05-01-22 @ 17:01):    No growth to date.    Culture - Blood (collected 04-30-22 @ 16:21)  Source: .Blood Blood-Peripheral  Preliminary Report (05-01-22 @ 17:01):    No growth to date.            Physical Examination:  PULM: Clear to auscultation bilaterally, no significant sputum production  CVS: S1, S2 heard      RADIOLOGY REVIEWED  CXR 4/29: small L pl effusion/atelectasis     CT chest: < from: CT Chest No Cont (04.04.22 @ 16:52) >  FINDINGS:    AIRWAYS, LUNGS, PLEURA: Tracheal secretions. Small left greater than   right pleural effusions increased compared to 2/19/2022. Right-sided   pleural thickening. Lingular and left greater than right basilar   opacification, likely atelectasis.    MEDIASTINUM: Cardiomegaly. No pericardial effusion. Thoracic aorta normal   caliber.  No large mediastinal lymph nodes.    IMAGED ABDOMEN: Nonspecific perinephric stranding.    SOFT TISSUES: Unremarkable.    BONES: Unremarkable.      IMPRESSION:.    Small left greater than right bilateral pleural effusions increased in   size compared to 2/19/2022.    Lingular and left greater than right basilar opacification, likely   atelectasis.    --- End of Report ---    < end of copied text >

## 2022-05-03 NOTE — PROGRESS NOTE ADULT - ASSESSMENT
67 year old gentleman with PMH of CAD, NSTEMI s/p 3 stents in 2014 (stents to LAD, proximal and distal LCx), ischemic cardiomyopathy, HTN for 10 years, T2DM for 26 years c/b peripheral neuropathy, CVA, TIA, Afib on Pradaxa, chronic RLE wound, L carotid stenosis s/p endarterectomy (2014) just recently admitted  to the [facility] on 2/19/2022 with acute onset chest pain for one day found to have an NSTEMI, CAD, s/p PCI in setting of increased AF rates off bb for 3 days and resolved chest pain, his CKMB peaked and Echo noted with EF 60%,normal LV function, mild TR, mild ND. He was s/p [facility] with 85% proximal LAD s/p balloon angioplasty and LULU. He presented to [facility] ED with decreased urine output over the week. [facility]. Stevens [facility] hematuria and was started  on Nitrofurantoin. Pt is still taking Nitrofurantoin w/ 5 days left. He came to the ED due to worsening symptoms. Pt reports having a similar incident 4-5 years ago that resolved spontaneously. He reports increased leg edema Dyspnea worse on exertion. his baseline Serum creatinine is in the 2.0 to 2.3 mg/dl range. ANT with serum creatinine of 8.29 with bun 144 and k 5.5        1- ANT on ckd III ---> ESRD likely   2- chf chronic   3- hyperphosphatemia /shpt   4- anemia   5- hyperlipidemia   6- HTN /a fib  7- TIA hx       HD today  F200, 240 min, , , 2L fluid removal  see HD flowsheet  renvela 2 tab with meals   anemia - epogen 24015 Units TIW with HD.  PT  leukocytosis    cx NGTD   ID following  seroquel 25 mg at bedtime  hydralazine 25 mg tid   educated patient on importance of taking eliquis, cardizem and hydralazine, he refused these meds this morning. Now in agreement to take them

## 2022-05-03 NOTE — PROGRESS NOTE ADULT - SUBJECTIVE AND OBJECTIVE BOX
DATE OF SERVICE: 05-03-22 @ 15:12  CHIEF COMPLAINT:Patient is a 67y old  Male who presents with a chief complaint of Decreased urine output for the past week, leg oedema (03 May 2022 13:44)    	        PAST MEDICAL & SURGICAL HISTORY:  HTN (hypertension), benign    HLD (hyperlipidemia)    DM type 2 (diabetes mellitus, type 2)    TIA (transient ischemic attack)    Atrial fibrillation    MI (myocardial infarction)  circa 2014 and 2022.    CAD (coronary artery disease)    Neuropathy    Stage 3 chronic kidney disease    2019 novel coronavirus disease (COVID-19)  12/2021.  Received the COVID-19 vaccine x 2 as of April 2022.    Status post angioplasty with stent  LULU x 3 2/7/2014    S/P carotid endarterectomy  left            feels better  RESPIRATORY: No cough, wheezing, chills or hemoptysis; No Shortness of Breath  CARDIOVASCULAR: No chest pain, palpitations, passing out,   GASTROINTESTINAL: No abdominal or epigastric pain. No nausea, vomiting,   GENITOURINARY: No dysuria, frequency, hematuria, or incontinence  NEUROLOGICAL: No headaches,     Medications:  MEDICATIONS  (STANDING):  allopurinol 100 milliGRAM(s) Oral daily  apixaban 5 milliGRAM(s) Oral two times a day  aspirin enteric coated 81 milliGRAM(s) Oral daily  atorvastatin 40 milliGRAM(s) Oral at bedtime  chlorhexidine 2% Cloths 1 Application(s) Topical <User Schedule>  chlorhexidine 4% Liquid 1 Application(s) Topical <User Schedule>  dextrose 50% Injectable 25 Gram(s) IV Push once  dextrose 50% Injectable 25 Gram(s) IV Push once  dextrose 50% Injectable 12.5 Gram(s) IV Push once  diltiazem    milliGRAM(s) Oral daily  epoetin naz-epbx (RETACRIT) Injectable 42735 Unit(s) IV Push once  hydrALAZINE 25 milliGRAM(s) Oral three times a day  insulin lispro (ADMELOG) corrective regimen sliding scale   SubCutaneous three times a day before meals  insulin lispro (ADMELOG) corrective regimen sliding scale   SubCutaneous at bedtime  lidocaine   4% Patch 1 Patch Transdermal daily  metoprolol succinate  milliGRAM(s) Oral daily  polyethylene glycol 3350 17 Gram(s) Oral two times a day  povidone iodine 10% Solution 1 Application(s) Topical daily  senna 2 Tablet(s) Oral at bedtime  sevelamer carbonate 1600 milliGRAM(s) Oral three times a day with meals    MEDICATIONS  (PRN):  bisacodyl 5 milliGRAM(s) Oral every 12 hours PRN Constipation  dextrose Oral Gel 15 Gram(s) Oral once PRN Blood Glucose LESS THAN 70 milliGRAM(s)/deciliter  QUEtiapine 25 milliGRAM(s) Oral at bedtime PRN agitation / insomnia  sodium chloride 0.9% lock flush 10 milliLiter(s) IV Push every 1 hour PRN Pre/post blood products, medications, blood draw, and to maintain line patency    	    PHYSICAL EXAM:  T(C): 36.5 (05-03-22 @ 11:08), Max: 36.6 (05-02-22 @ 21:13)  HR: 76 (05-03-22 @ 12:28) (76 - 109)  BP: 122/66 (05-03-22 @ 12:28) (122/66 - 166/61)  RR: 18 (05-03-22 @ 11:08) (16 - 18)  SpO2: 97% (05-03-22 @ 12:28) (96% - 99%)  Wt(kg): --  I&O's Summary    02 May 2022 07:01  -  03 May 2022 07:00  --------------------------------------------------------  IN: 120 mL / OUT: 0 mL / NET: 120 mL    03 May 2022 07:01  -  03 May 2022 15:12  --------------------------------------------------------  IN: 300 mL / OUT: 0 mL / NET: 300 mL        Appearance: Normal	  HEENT:   Normal oral mucosa, PERRL, EOMI	  Lymphatic: No lymphadenopathy  Cardiovascular: Normal S1 S2,   Respiratory: dec bs   Psychiatry: A & O x   Gastrointestinal:  Soft,no r/g   ext pvd      Neurologic: Non-focal    TELEMETRY: 	    ECG:  	  RADIOLOGY:  OTHER: 	  	  LABS:	 	    CARDIAC MARKERS:                                10.0   13.28 )-----------( 308      ( 03 May 2022 02:01 )             33.9     05-03    132<L>  |  93<L>  |  41<H>  ----------------------------<  174<H>  4.7   |  23  |  7.97<H>    Ca    9.1      03 May 2022 02:01  Phos  3.9     05-03  Mg     2.6     05-03      proBNP:   Lipid Profile:   HgA1c:   TSH:

## 2022-05-04 NOTE — CHART NOTE - NSCHARTNOTEFT_GEN_A_CORE
Patient with episode of afib RVR overnight; EKG done and patient seen bedside. No acute complaints though patient is A&Ox1-2 at baseline. Additional vital signs stable. Patient given lopressor 5mg IVP x2 with improvement to heart rate to low 100s. Will continue to monitor patient on telemetry overnight.    Kaitlynn DAVIS  Medicine Patient with episode of afib RVR overnight with rates to 160-170s sustained; EKG done and patient seen bedside. No acute complaints though patient is A&Ox1-2 at baseline. Additional vital signs stable. Patient given lopressor 5mg IVP x2 with improvement to heart rate to low 100s. Will continue to monitor patient on telemetry overnight.    Kaitlynn DAVIS  Medicine Patient with episode of afib RVR overnight with rates to 160-170s sustained; EKG done and patient seen bedside. No acute complaints though patient is A&Ox1-2 at baseline. Additional vital signs stable. Patient given lopressor 5mg IVP x2 with improvement to heart rate to low 100s. Remains on eliquis BID. Will continue to monitor patient on telemetry overnight.    Kaitlynn Olivera PA  Medicine

## 2022-05-04 NOTE — PROGRESS NOTE ADULT - SUBJECTIVE AND OBJECTIVE BOX
CARDIOLOGY FOLLOW UP - Dr. Mazariegos  DATE OF SERVICE: 5/4/22     CC no cp or sob   events noted RVR last night sp 5mg IVP x2      REVIEW OF SYSTEMS:  CONSTITUTIONAL: No fever, weight loss, or fatigue  RESPIRATORY: No cough, wheezing, chills or hemoptysis; No Shortness of Breath  CARDIOVASCULAR: No chest pain, palpitations, passing out, dizziness, or leg swelling  GASTROINTESTINAL: No abdominal or epigastric pain. No nausea, vomiting, or hematemesis; No diarrhea or constipation. No melena or hematochezia.  VASCULAR: No edema     PHYSICAL EXAM:  T(C): 36.3 (05-04-22 @ 13:05), Max: 37 (05-04-22 @ 00:31)  HR: 76 (05-04-22 @ 13:05) (69 - 143)  BP: 126/56 (05-04-22 @ 13:05) (91/67 - 126/66)  RR: 17 (05-04-22 @ 13:05) (16 - 18)  SpO2: 100% (05-04-22 @ 13:05) (95% - 100%)  Wt(kg): --  I&O's Summary    03 May 2022 07:01  -  04 May 2022 07:00  --------------------------------------------------------  IN: 300 mL / OUT: 2000 mL / NET: -1700 mL    04 May 2022 07:01  -  04 May 2022 13:36  --------------------------------------------------------  IN: 100 mL / OUT: 0 mL / NET: 100 mL        Appearance: Normal	  Cardiovascular: Normal S1 S2, irreg  Respiratory: Lungs clear to auscultation	  Gastrointestinal:  Soft, Non-tender, + BS	  Extremities: Normal range of motion, No clubbing, cyanosis or edema      Home Medications:  allopurinol 100 mg oral tablet: 1 tab(s) orally once a day (03 Apr 2022 06:34)  aspirin 81 mg oral delayed release tablet: 1 tab(s) orally once a day (03 Apr 2022 06:34)  bumetanide 2 mg oral tablet: 1 tab(s) orally once a day (03 Apr 2022 06:34)  insulin glargine 100 units/mL subcutaneous solution: 25 unit(s) subcutaneous once a day (at bedtime) (03 Apr 2022 06:34)  metOLazone 5 mg oral tablet: 1 tab(s) orally 3 times a week (03 Apr 2022 06:34)  metoprolol succinate 50 mg oral tablet, extended release: 2 tab(s) orally once a day (03 Apr 2022 06:34)  NovoLOG 100 units/mL subcutaneous solution: subcutaneous 4 times a day (before meals and at bedtime) (03 Apr 2022 06:34)  NovoLOG FlexPen 100 units/mL injectable solution: 10 unit(s) subcutaneous 3 times a day (03 Apr 2022 06:34)  oxycodone-acetaminophen 5 mg-325 mg oral tablet: 1 tab(s) orally 2 times a day (03 Apr 2022 06:34)  Pradaxa 150 mg oral capsule: 1 cap(s) orally 2 times a day (03 Apr 2022 06:34)  pregabalin 100 mg oral capsule: 1 cap(s) orally 3 times a day (03 Apr 2022 06:34)      MEDICATIONS  (STANDING):  allopurinol 100 milliGRAM(s) Oral daily  apixaban 5 milliGRAM(s) Oral two times a day  aspirin enteric coated 81 milliGRAM(s) Oral daily  atorvastatin 40 milliGRAM(s) Oral at bedtime  chlorhexidine 2% Cloths 1 Application(s) Topical <User Schedule>  chlorhexidine 4% Liquid 1 Application(s) Topical <User Schedule>  dextrose 50% Injectable 25 Gram(s) IV Push once  dextrose 50% Injectable 25 Gram(s) IV Push once  dextrose 50% Injectable 12.5 Gram(s) IV Push once  diltiazem    milliGRAM(s) Oral daily  hydrALAZINE 25 milliGRAM(s) Oral three times a day  insulin lispro (ADMELOG) corrective regimen sliding scale   SubCutaneous three times a day before meals  insulin lispro (ADMELOG) corrective regimen sliding scale   SubCutaneous at bedtime  lidocaine   4% Patch 1 Patch Transdermal daily  metoprolol succinate  milliGRAM(s) Oral daily  polyethylene glycol 3350 17 Gram(s) Oral two times a day  povidone iodine 10% Solution 1 Application(s) Topical daily  senna 2 Tablet(s) Oral at bedtime  sevelamer carbonate 1600 milliGRAM(s) Oral three times a day with meals      TELEMETRY: afib hr 90s     ECG:  	  RADIOLOGY:   DIAGNOSTIC TESTING:  [ ] Echocardiogram:  [ ]  Catheterization:  [ ] Stress Test:    OTHER: 	    LABS:	 	                            10.0   13.28 )-----------( 308      ( 03 May 2022 02:01 )             33.9     05-04    132<L>  |  90<L>  |  22  ----------------------------<  209<H>  4.6   |  22  |  5.26<H>    Ca    9.2      04 May 2022 07:11  Phos  3.9     05-03  Mg     2.6     05-03

## 2022-05-04 NOTE — PROGRESS NOTE ADULT - SUBJECTIVE AND OBJECTIVE BOX
DATE OF SERVICE: 05-04-22 @ 10:59  CHIEF COMPLAINT:Patient is a 67y old  Male who presents with a chief complaint of Decreased urine output for the past week, leg oedema (04 May 2022 10:43)    	        PAST MEDICAL & SURGICAL HISTORY:  HTN (hypertension), benign    HLD (hyperlipidemia)    DM type 2 (diabetes mellitus, type 2)    TIA (transient ischemic attack)    Atrial fibrillation    MI (myocardial infarction)  circa 2014 and 2022.    CAD (coronary artery disease)    Neuropathy    Stage 3 chronic kidney disease    2019 novel coronavirus disease (COVID-19)  12/2021.  Received the COVID-19 vaccine x 2 as of April 2022.    Status post angioplasty with stent  LULU x 3 2/7/2014    S/P carotid endarterectomy  left            evenys noted  no cp or sob  some foot pain  no chills  no vomiting      Medications:  MEDICATIONS  (STANDING):  allopurinol 100 milliGRAM(s) Oral daily  apixaban 5 milliGRAM(s) Oral two times a day  aspirin enteric coated 81 milliGRAM(s) Oral daily  atorvastatin 40 milliGRAM(s) Oral at bedtime  chlorhexidine 2% Cloths 1 Application(s) Topical <User Schedule>  chlorhexidine 4% Liquid 1 Application(s) Topical <User Schedule>  dextrose 50% Injectable 25 Gram(s) IV Push once  dextrose 50% Injectable 25 Gram(s) IV Push once  dextrose 50% Injectable 12.5 Gram(s) IV Push once  diltiazem    milliGRAM(s) Oral daily  hydrALAZINE 25 milliGRAM(s) Oral three times a day  insulin lispro (ADMELOG) corrective regimen sliding scale   SubCutaneous three times a day before meals  insulin lispro (ADMELOG) corrective regimen sliding scale   SubCutaneous at bedtime  lidocaine   4% Patch 1 Patch Transdermal daily  metoprolol succinate  milliGRAM(s) Oral daily  polyethylene glycol 3350 17 Gram(s) Oral two times a day  povidone iodine 10% Solution 1 Application(s) Topical daily  senna 2 Tablet(s) Oral at bedtime  sevelamer carbonate 1600 milliGRAM(s) Oral three times a day with meals    MEDICATIONS  (PRN):  bisacodyl 5 milliGRAM(s) Oral every 12 hours PRN Constipation  dextrose Oral Gel 15 Gram(s) Oral once PRN Blood Glucose LESS THAN 70 milliGRAM(s)/deciliter  QUEtiapine 25 milliGRAM(s) Oral at bedtime PRN agitation / insomnia  sodium chloride 0.9% lock flush 10 milliLiter(s) IV Push every 1 hour PRN Pre/post blood products, medications, blood draw, and to maintain line patency    	    PHYSICAL EXAM:  T(C): 36.4 (05-04-22 @ 05:07), Max: 37 (05-04-22 @ 00:31)  HR: 103 (05-04-22 @ 05:07) (69 - 143)  BP: 123/60 (05-04-22 @ 05:07) (91/67 - 126/66)  RR: 18 (05-04-22 @ 05:07) (16 - 18)  SpO2: 95% (05-04-22 @ 05:07) (95% - 99%)  Wt(kg): --  I&O's Summary    03 May 2022 07:01  -  04 May 2022 07:00  --------------------------------------------------------  IN: 300 mL / OUT: 2000 mL / NET: -1700 mL          HEENT:   Normal oral mucosa,	    Cardiovascular: Normal S1 S2, No JVD,   Respiratory: dec bs   Gastrointestinal:  Soft, Non-tender, + BS	  	  Neurologic: Non-focal  Extremities: dec rom  pvd      TELEMETRY: 	    ECG:  	  RADIOLOGY:  OTHER: 	  	  LABS:	 	    CARDIAC MARKERS:                                10.0   13.28 )-----------( 308      ( 03 May 2022 02:01 )             33.9     05-04    132<L>  |  90<L>  |  22  ----------------------------<  209<H>  4.6   |  22  |  5.26<H>    Ca    9.2      04 May 2022 07:11  Phos  3.9     05-03  Mg     2.6     05-03      proBNP:   Lipid Profile:   HgA1c:   TSH:

## 2022-05-04 NOTE — PROGRESS NOTE ADULT - ASSESSMENT
A/P    68 y/o M with history of CAD, s/p multiple PCI, with most recent 2/22 PCI of LAD in setting of NSTEMI, on ASA only (pradaxa), chronic atrial fibrillation maintained on Pradaxa, essential HTN, type 2 DM previously on oral medications but on insulin, diabetic neuropathy with chronic RIGHT LE venous stasis changes>left, LEFT foot ulcer seen by podiatry, past endarterectomy LEFT CEA in 2014 presenting with 1 week of decreased urine output, cystitis with hematuria     #ANT on CKD, new HD  -oliguric renal failure in setting of UTI/cystitis  -etiology ??  -New HD for uremia, renal failure  -sp rrt 4/12 for uncontrolled bleeding sp  right groin shiley removal  - monitor h/h - stable  -s/p L AVG 4/18  -s/p permacath placement 4/20  -renal f/ u    #AMS/Lethargy  -recent ams from pain meds  -AMS sp RRT 4/27  likely secondary to pain med   -repeat CT 4/27 unremarkable --  discontinued Percocet- improved   -med f/u     #Acute on chronic HFpEF  -stable  -continue volume removal with HD  -c/w  bb    #Chronic AF  -brief rvr last night ref am meds prior to episode -- now stable   -c/w metoprolol succ 100 mg qd  -c/w dilt cd 120mg daily    -c/w eliquis 5 mg BID for now - heme stable     #HTN  -stable  -c/w meds   -increase hydral if bp remains elevated     #CAD, s/p PCI, most recent 2/2022  -stable, cont asa  -off plavix due to a/c indication  -statin     #R carotid stenosis  -cont med tx  -MRA noted with Greater than 75% stenosis of the right distal common carotid artery. Approximately 60% stenosis of the proximal left internal carotid artery.  -Continue ASA and Statin Therapy  -vasc outpt f/u Dr Pinto    dc to rehab

## 2022-05-04 NOTE — PROGRESS NOTE ADULT - SUBJECTIVE AND OBJECTIVE BOX
[facility], Division of Infectious Diseases  ADRIANA Seals Y. Patel, S. Shah, G. Casimir  [phone]  ([phone] - weekdays after 5pm and weekends)    Name: DYLAN DEL CASTILLO  Age/Gender: 67y Male  MRN: [id number]    Interval History:  Patient seen this morning.   Notes reviewed.   states he did not sleep well last night  reports occasional gout pain in his foot    Allergies: nitrofurantoin (Nephrotoxicity)      Objective:  Vitals:   T(F): 97.5 (05-04-22 @ 05:07), Max: 98.6 (05-04-22 @ 00:31)  HR: 103 (05-04-22 @ 05:07) (69 - 143)  BP: 123/60 (05-04-22 @ 05:07) (91/67 - 126/66)  RR: 18 (05-04-22 @ 05:07) (16 - 18)  SpO2: 95% (05-04-22 @ 05:07) (95% - 99%)  Physical Examination:  General: no acute distress  HEENT: anicteric  Cardio: S1, S2 present, normal rate  Resp: clear to auscultation anteriorly  Abd: soft, ND, NT  Ext: no LE edema  Skin: warm, dry, L foot wrapped w/ dressing  Lines:    Laboratory Studies:  CBC:                       10.0   13.28 )-----------( 308      ( 03 May 2022 02:01 )             33.9     WBC Trend:  13.28 05-03-22 @ 02:01  14.26 05-02-22 @ 06:28  16.22 05-01-22 @ 07:29  12.06 04-30-22 @ 07:06  15.61 04-29-22 @ 07:22  10.33 04-28-22 @ 07:05    CMP: 05-04    132<L>  |  90<L>  |  22  ----------------------------<  209<H>  4.6   |  22  |  5.26<H>    Ca    9.2      04 May 2022 07:11  Phos  3.9     05-03  Mg     2.6     05-03              Microbiology: reviewed     Culture - Blood (collected 04-30-22 @ 16:21)  Source: .Blood Blood-Peripheral  Preliminary Report (05-01-22 @ 17:01):    No growth to date.    Culture - Blood (collected 04-30-22 @ 16:21)  Source: .Blood Blood-Peripheral  Preliminary Report (05-01-22 @ 17:01):    No growth to date.      Radiology: reviewed     Medications:  allopurinol 100 milliGRAM(s) Oral daily  apixaban 5 milliGRAM(s) Oral two times a day  aspirin enteric coated 81 milliGRAM(s) Oral daily  atorvastatin 40 milliGRAM(s) Oral at bedtime  bisacodyl 5 milliGRAM(s) Oral every 12 hours PRN  chlorhexidine 2% Cloths 1 Application(s) Topical <User Schedule>  chlorhexidine 4% Liquid 1 Application(s) Topical <User Schedule>  dextrose 50% Injectable 25 Gram(s) IV Push once  dextrose 50% Injectable 12.5 Gram(s) IV Push once  dextrose 50% Injectable 25 Gram(s) IV Push once  dextrose Oral Gel 15 Gram(s) Oral once PRN  diltiazem    milliGRAM(s) Oral daily  hydrALAZINE 25 milliGRAM(s) Oral three times a day  insulin lispro (ADMELOG) corrective regimen sliding scale   SubCutaneous at bedtime  insulin lispro (ADMELOG) corrective regimen sliding scale   SubCutaneous three times a day before meals  lidocaine   4% Patch 1 Patch Transdermal daily  metoprolol succinate  milliGRAM(s) Oral daily  polyethylene glycol 3350 17 Gram(s) Oral two times a day  povidone iodine 10% Solution 1 Application(s) Topical daily  QUEtiapine 25 milliGRAM(s) Oral at bedtime PRN  senna 2 Tablet(s) Oral at bedtime  sevelamer carbonate 1600 milliGRAM(s) Oral three times a day with meals  sodium chloride 0.9% lock flush 10 milliLiter(s) IV Push every 1 hour PRN    Antimicrobials:

## 2022-05-04 NOTE — PROGRESS NOTE ADULT - ASSESSMENT
Patient is a 67 year old male with PMH of CAD with past multiple PCI, with most recent discharge from [place] in Feb 2022 for NSTEMI with LULU of an 85% prox LAD lesion with patient on ASA and now off Plavix per cardiology (only for one month), chronic afib on Pradaxa, HTN, type 2 DM on insulin, diabetic neuropathy with chronic RIGHT LE venous stasis changes>left, LEFT foot ulcer seen by podiatry, past endarterectomy LEFT CEA in 2014 who presented with UTI with hematuria diagnosed at urgent care and now with decreased urine output.    4/18 for LUE AVF creation, possible graft placement.    Leukocytosis  pt has had fluctuations throughout hospital course  all cultures remain NGTD including 4/30  monitor stool output and consistency  CXR w/o evidence of focal consolidation and pt w/o respiratory symptoms  pt no longer makes urine, denies abd pain or suprapubic tenderness  no tenderness or erythema at IV sites  no frontal, maxillary or ethmoid sinus tenderness on exam  pt w/ poor dentitions  continue to monitor off antibiotics  leukocytosis slowly improving, likely reactive  no objection to discharge from ID standpoint    Acute cystitis  S/p ceftriaxone x7 day course completed 4/9    ANT on CKD3  Nephrology following, appreciate recs  s/p placement of shiley and initiation of HD; s/p permcath placement 4/20  monitor Cr   renally dose meds   Avoid nephrotoxic agents    B/l LE edema with chronic venous stasis  Acute on chronic HFpEF  legs cool to touch, nontender, chronic changes with no sign of infection/cellulitis  has wound on bottom of L foot- evaluated by team last week- dry and does not appear infected either  attempted to evaluate wound, however, patient refused exam     DM2 with neuropathy  Blood glucose management per primary team.    Eugene Meeks M.D.  [facility], Division of Infectious Diseases  [phone]  After 5pm on weekdays and all day on weekends - please call [phone]

## 2022-05-04 NOTE — PROGRESS NOTE ADULT - ASSESSMENT
·  Problem: Acute kidney injury superimposed on CKD. pt currently on hd  to cont as per renal  avf done    perm cath done     Problem/Plan - 2:  ·  Problem: Chronic atrial fibrillation. c/w a/c  cards f/u     Problem/Plan - 3:  ·  Problem: Cystitis.   ID follow up appreciated        Problem/Plan - 4:  ·  Problem: Type 2 diabetes mellitus. c/w insulin   endo f/u      Problem/Plan - 5:  ·  Problem: CAD (coronary artery disease).   rapid a fib  meds adjusted  hr better  c/w a/c       cards f/u     constipation   bowel regimen      gout pain   control/pain    rehab planning

## 2022-05-04 NOTE — PROVIDER CONTACT NOTE (OTHER) - ASSESSMENT
Patient baseline mental status A&Ox2. No complaints of palpitations or chest pain. Oral temp-98.1, HR-143, BP-126/66, RR-16, SPO2-95%.

## 2022-05-04 NOTE — PROGRESS NOTE ADULT - SUBJECTIVE AND OBJECTIVE BOX
Follow-up Pulm Progress Note    No new respiratory events overnight.  Denies SOB/CP.     Medications:  MEDICATIONS  (STANDING):  allopurinol 100 milliGRAM(s) Oral daily  apixaban 5 milliGRAM(s) Oral two times a day  aspirin enteric coated 81 milliGRAM(s) Oral daily  atorvastatin 40 milliGRAM(s) Oral at bedtime  chlorhexidine 2% Cloths 1 Application(s) Topical <User Schedule>  chlorhexidine 4% Liquid 1 Application(s) Topical <User Schedule>  dextrose 50% Injectable 25 Gram(s) IV Push once  dextrose 50% Injectable 25 Gram(s) IV Push once  dextrose 50% Injectable 12.5 Gram(s) IV Push once  diltiazem    milliGRAM(s) Oral daily  hydrALAZINE 25 milliGRAM(s) Oral three times a day  insulin lispro (ADMELOG) corrective regimen sliding scale   SubCutaneous three times a day before meals  insulin lispro (ADMELOG) corrective regimen sliding scale   SubCutaneous at bedtime  lidocaine   4% Patch 1 Patch Transdermal daily  metoprolol succinate  milliGRAM(s) Oral daily  polyethylene glycol 3350 17 Gram(s) Oral two times a day  povidone iodine 10% Solution 1 Application(s) Topical daily  senna 2 Tablet(s) Oral at bedtime  sevelamer carbonate 1600 milliGRAM(s) Oral three times a day with meals    MEDICATIONS  (PRN):  bisacodyl 5 milliGRAM(s) Oral every 12 hours PRN Constipation  dextrose Oral Gel 15 Gram(s) Oral once PRN Blood Glucose LESS THAN 70 milliGRAM(s)/deciliter  QUEtiapine 25 milliGRAM(s) Oral at bedtime PRN agitation / insomnia  sodium chloride 0.9% lock flush 10 milliLiter(s) IV Push every 1 hour PRN Pre/post blood products, medications, blood draw, and to maintain line patency          Vital Signs Last 24 Hrs  T(C): 36.8 (04 May 2022 12:23), Max: 37 (04 May 2022 00:31)  T(F): 98.2 (04 May 2022 12:23), Max: 98.6 (04 May 2022 00:31)  HR: 79 (04 May 2022 12:23) (69 - 143)  BP: 104/60 (04 May 2022 12:23) (91/67 - 126/66)  BP(mean): --  RR: 18 (04 May 2022 12:23) (16 - 18)  SpO2: 100% (04 May 2022 12:23) (95% - 100%)          05-03 @ 07:01  -  05-04 @ 07:00  --------------------------------------------------------  IN: 300 mL / OUT: 2000 mL / NET: -1700 mL          LABS:                        10.0   13.28 )-----------( 308      ( 03 May 2022 02:01 )             33.9     05-04    132<L>  |  90<L>  |  22  ----------------------------<  209<H>  4.6   |  22  |  5.26<H>    Ca    9.2      04 May 2022 07:11  Phos  3.9     05-03  Mg     2.6     05-03            CAPILLARY BLOOD GLUCOSE      POCT Blood Glucose.: 261 mg/dL (04 May 2022 08:16)            DEREJE Negative 04-11 @ 16:24  Anti SS-1 --  Anti SS-2 --  Anti RNP --  RF -- 04-11 @ 16:24    Atypical ANCA Indeterminate Method interference due to DEREJE fluorescence 04-11 @ 16:24  c-ANCA titer -- 04-11 @ 16:24  c-ANCA Negative 04-11 @ 16:24  p-ANCA Negative 04-11 @ 16:24              CULTURES:    Culture - Blood (collected 04-30-22 @ 16:21)  Source: .Blood Blood-Peripheral  Preliminary Report (05-01-22 @ 17:01):    No growth to date.    Culture - Blood (collected 04-30-22 @ 16:21)  Source: .Blood Blood-Peripheral  Preliminary Report (05-01-22 @ 17:01):    No growth to date.              Physical Examination:  PULM: Clear to auscultation bilaterally, no significant sputum production  CVS: S1, S2 heard      RADIOLOGY REVIEWED  CXR 4/29: small L pl effusion/atelectasis     CT chest: < from: CT Chest No Cont (04.04.22 @ 16:52) >  FINDINGS:    AIRWAYS, LUNGS, PLEURA: Tracheal secretions. Small left greater than   right pleural effusions increased compared to 2/19/2022. Right-sided   pleural thickening. Lingular and left greater than right basilar   opacification, likely atelectasis.    MEDIASTINUM: Cardiomegaly. No pericardial effusion. Thoracic aorta normal   caliber.  No large mediastinal lymph nodes.    IMAGED ABDOMEN: Nonspecific perinephric stranding.    SOFT TISSUES: Unremarkable.    BONES: Unremarkable.      IMPRESSION:.    Small left greater than right bilateral pleural effusions increased in   size compared to 2/19/2022.    Lingular and left greater than right basilar opacification, likely   atelectasis.    --- End of Report ---    < end of copied text >

## 2022-05-05 NOTE — PROGRESS NOTE ADULT - NS ATTEND AMEND GEN_ALL_CORE FT
orthostatic today likely deconditioned however, also with decrease po intake x 2 d as well   gentle ivf   no fluid removal with hd  may need to decrease hydralazine as well

## 2022-05-05 NOTE — PROGRESS NOTE ADULT - ASSESSMENT
67 year old gentleman with PMH of CAD, NSTEMI s/p 3 stents in 2014 (stents to LAD, proximal and distal LCx), ischemic cardiomyopathy, HTN for 10 years, T2DM for 26 years c/b peripheral neuropathy, CVA, TIA, Afib on Pradaxa, chronic RLE wound, L carotid stenosis s/p endarterectomy (2014) just recently admitted  to the [facility] on 2/19/2022 with acute onset chest pain for one day found to have an NSTEMI, CAD, s/p PCI in setting of increased AF rates off bb for 3 days and resolved chest pain, his CKMB peaked and Echo noted with EF 60%,normal LV function, mild TR, mild VT. He was s/p [facility] with 85% proximal LAD s/p balloon angioplasty and LULU. He presented to [facility] ED with decreased urine output over the week. [facility] and was started  on Nitrofurantoin. Pt is still taking Nitrofurantoin w/ 5 days left. He came to the ED due to worsening symptoms. Pt reports having a similar incident 4-5 years ago that resolved spontaneously. He reports increased leg edema Dyspnea worse on exertion. his baseline Serum creatinine is in the 2.0 to 2.3 mg/dl range. ANT with serum creatinine of 8.29 with bun 144 and k 5.5        1- ANT on ckd III ---> ESRD likely   2- chf chronic   3- hyperphosphatemia /shpt   4- anemia   5- hyperlipidemia   6- HTN /a fib  7- TIA hx       HD today  F200, 240 min, , , 2L fluid removal ordered  see HD flowsheet  500 cc bolus given orthostasis  no fluid removal with HD today, d/w dialysis RN  can decrease time to 210 min, d/w dialysis RN  renvela 2 tab with meals   anemia - epogen 89087 Units TIW with HD.  PT  seroquel 25 mg at bedtime  hydralazine 25 mg tid  67 year old gentleman with PMH of CAD, NSTEMI s/p 3 stents in 2014 (stents to LAD, proximal and distal LCx), ischemic cardiomyopathy, HTN for 10 years, T2DM for 26 years c/b peripheral neuropathy, CVA, TIA, Afib on Pradaxa, chronic RLE wound, L carotid stenosis s/p endarterectomy (2014) just recently admitted  to the [facility] on 2/19/2022 with acute onset chest pain for one day found to have an NSTEMI, CAD, s/p PCI in setting of increased AF rates off bb for 3 days and resolved chest pain, his CKMB peaked and Echo noted with EF 60%,normal LV function, mild TR, mild NM. He was s/p [facility] with 85% proximal LAD s/p balloon angioplasty and LULU. He presented to [facility] ED with decreased urine output over the week. [facility] and was started  on Nitrofurantoin. Pt is still taking Nitrofurantoin w/ 5 days left. He came to the ED due to worsening symptoms. Pt reports having a similar incident 4-5 years ago that resolved spontaneously. He reports increased leg edema Dyspnea worse on exertion. his baseline Serum creatinine is in the 2.0 to 2.3 mg/dl range. ANT with serum creatinine of 8.29 with bun 144 and k 5.5        1- ANT on ckd III ---> ESRD likely   2- chf chronic   3- hyperphosphatemia /shpt   4- anemia   5- hyperlipidemia   6- HTN /a fib  7- TIA hx       HD today  F200, 240 min, , , 0  fluid removal ordered  see HD flowsheet  500 cc bolus given orthostasis  no fluid removal with HD today, d/w dialysis RN  can decrease time to 210 min, d/w dialysis RN  renvela 2 tab with meals   anemia - epogen 59089 Units TIW with HD.  PT  seroquel 25 mg at bedtime  hydralazine 25 mg tid

## 2022-05-05 NOTE — DIETITIAN NUTRITION RISK NOTIFICATION - TREATMENT: THE FOLLOWING DIET HAS BEEN RECOMMENDED
Diet, Renal Restrictions:   For patients receiving Renal Replacement - No Protein Restr, No Conc K, No Conc Phos, Low Sodium  Consistent Carbohydrate {No Snacks} (CSTCHO)  DASH/TLC {Sodium & Cholesterol Restricted} (DASH)  1500mL Fluid Restriction ([id number])  Supplement Feeding Modality:  Oral  Nepro Cans or Servings Per Day:  1       Frequency:  Two Times a day (04-25-22 @ 16:24) [Active]

## 2022-05-05 NOTE — CHART NOTE - NSCHARTNOTEFT_GEN_A_CORE
Medicine NP   Patient is a 67y old  Male who presents with a chief complaint of Decreased urine output for the past week, leg oedema.  Notified by RN patient with episode of bradycardia on tele to 30's for a couple of seconds, patient is back to 80s.       Vital Signs Last 24 Hrs  T(C): 36.3 (04 May 2022 20:30), Max: 36.8 (04 May 2022 12:23)  T(F): 97.3 (04 May 2022 20:30), Max: 98.2 (04 May 2022 12:23)  HR: 79 (04 May 2022 22:51) (76 - 103)  BP: 116/67 (04 May 2022 22:51) (104/60 - 126/56)  BP(mean): --  RR: 18 (04 May 2022 20:30) (17 - 18)  SpO2: 98% (04 May 2022 20:30) (95% - 100%)      Labs:      05-04    132<L>  |  90<L>  |  22  ----------------------------<  209<H>  4.6   |  22  |  5.26<H>    Ca    9.2      04 May 2022 07:11          Radiology:    Physical Exam:  General: WN/WD NAD  Neurology: A&Ox3, nonfocal, ROA x 4  Head:  Normocephalic, atraumatic  Respiratory: CTA B/L  CV: RRR, S1S2, no murmur  Abdominal: Soft, NT, ND no palpable mass  MSK: No edema, + peripheral pulses, FROM all 4 extremity    Assessment & Plan:  HPI:  NIGHT HOSPITALIST:   Patient remotely known to me from an admission to [facility] [place] in Feb 2017--assigned to me at this point by the ER to admit this 68 y/o M--followed by his nephrologist above--patient with a history of CAD with past multiple PCI, with most recent discharge from [facility] in Feb 2022 for non ST elevation MI with LULU of an 85% prox LAD lesion with patient on ASA and now off Plavix per cardiology (only for one month), chronic atrial fibrillation maintained on Pradaxa, essential HTN, type 2 DM previously on oral medications but on insulin, diabetic neuropathy with chronic RIGHT LE venous stasis changes>left, LEFT foot ulcer seen by podiatry, past endarterectomy LEFT CEA in 2014. with patient self referring to the ER following apparently treatment by an urgent care center for an apparent cystitis with hematuria on nitrofurantoin but with patient noting decreased urine output for the past week, and difficulty with B/L coarse tremors for several weeks, with patient having difficulty negotiating his applications on his smart phone (observed by examiner).  Patient with increasing B/L LE oedema and weight gain, with patient with 2 pillow orthopnoea.      Patient was recommended for a [place] but patient refused, patient wishing to review the issue with his daughter.    Patient with past history of COVID-19 in 12/2021 and has received the COVID-19 vaccine x 2.    NO fever, no chills, no rigors.  No diaphoresis.  No back pain, no tearing back pain. (03 Apr 2022 06:29)    >  >  >  >        Follow up with Attending in AM. Medicine NP   Patient is a 67y old  Male who presents with a chief complaint of Decreased urine output for the past week, and leg edema.  Notified by RN patient with episode of brief bradycardia on tele to 30's for a couple of seconds while sleeping, with returned to HR to 80s.  Patient asymptomatic, denies CP, palpitations, lightheadedness or N/V.    A/P  Patient sleeping when bradycardic episode happened, HR back to baseline Afib 80's. BP stable.   >STAT 12 lead EKG done with Afib HR 80's and unchanged from previous EKG. No acute changes   >F/u morning labs and replete electrolytes as need it   >C/w telemetry monitoring   >C/w BB     Will f/u with attending and day team in the AM     Emelin Reyes [place]onegro, AGACN  Medicine Department   [facility] [phone]

## 2022-05-05 NOTE — PROGRESS NOTE ADULT - SUBJECTIVE AND OBJECTIVE BOX
Follow-up Pulm Progress Note    No new respiratory events overnight.  Denies SOB/CP.     Medications:  MEDICATIONS  (STANDING):  allopurinol 100 milliGRAM(s) Oral daily  apixaban 5 milliGRAM(s) Oral two times a day  aspirin enteric coated 81 milliGRAM(s) Oral daily  atorvastatin 40 milliGRAM(s) Oral at bedtime  chlorhexidine 2% Cloths 1 Application(s) Topical <User Schedule>  chlorhexidine 4% Liquid 1 Application(s) Topical <User Schedule>  dextrose 50% Injectable 25 Gram(s) IV Push once  dextrose 50% Injectable 25 Gram(s) IV Push once  dextrose 50% Injectable 12.5 Gram(s) IV Push once  diltiazem    milliGRAM(s) Oral daily  epoetin naz-epbx (RETACRIT) Injectable 78716 Unit(s) IV Push once  hydrALAZINE 25 milliGRAM(s) Oral three times a day  insulin lispro (ADMELOG) corrective regimen sliding scale   SubCutaneous at bedtime  insulin lispro (ADMELOG) corrective regimen sliding scale   SubCutaneous three times a day before meals  lidocaine   4% Patch 1 Patch Transdermal daily  metoprolol succinate  milliGRAM(s) Oral daily  polyethylene glycol 3350 17 Gram(s) Oral two times a day  povidone iodine 10% Solution 1 Application(s) Topical daily  senna 2 Tablet(s) Oral at bedtime  sevelamer carbonate 1600 milliGRAM(s) Oral three times a day with meals    MEDICATIONS  (PRN):  bisacodyl 5 milliGRAM(s) Oral every 12 hours PRN Constipation  dextrose Oral Gel 15 Gram(s) Oral once PRN Blood Glucose LESS THAN 70 milliGRAM(s)/deciliter  QUEtiapine 25 milliGRAM(s) Oral at bedtime PRN agitation / insomnia  sodium chloride 0.9% lock flush 10 milliLiter(s) IV Push every 1 hour PRN Pre/post blood products, medications, blood draw, and to maintain line patency          Vital Signs Last 24 Hrs  T(C): 36.2 (05 May 2022 11:25), Max: 36.6 (05 May 2022 04:52)  T(F): 97.2 (05 May 2022 11:25), Max: 97.9 (05 May 2022 04:52)  HR: 73 (05 May 2022 12:25) (62 - 95)  BP: 148/55 (05 May 2022 12:25) (110/67 - 148/55)  BP(mean): --  RR: 17 (05 May 2022 11:25) (17 - 18)  SpO2: 95% (05 May 2022 11:25) (95% - 100%)          05-04 @ 07:01  -  05-05 @ 07:00  --------------------------------------------------------  IN: 100 mL / OUT: 0 mL / NET: 100 mL          LABS:    05-04    132<L>  |  90<L>  |  22  ----------------------------<  209<H>  4.6   |  22  |  5.26<H>    Ca    9.2      04 May 2022 07:11            CAPILLARY BLOOD GLUCOSE      POCT Blood Glucose.: 227 mg/dL (05 May 2022 12:34)            DEREJE Negative 04-11 @ 16:24  Anti SS-1 --  Anti SS-2 --  Anti RNP --  RF -- 04-11 @ 16:24    Atypical ANCA Indeterminate Method interference due to DEREJE fluorescence 04-11 @ 16:24  c-ANCA titer -- 04-11 @ 16:24  c-ANCA Negative 04-11 @ 16:24  p-ANCA Negative 04-11 @ 16:24              CULTURES: (if applicable)    Culture - Blood (collected 04-30-22 @ 16:21)  Source: .Blood Blood-Peripheral  Preliminary Report (05-01-22 @ 17:01):    No growth to date.    Culture - Blood (collected 04-30-22 @ 16:21)  Source: .Blood Blood-Peripheral  Preliminary Report (05-01-22 @ 17:01):    No growth to date.    Physical Examination:  PULM: Clear to auscultation bilaterally, no significant sputum production  CVS: S1, S2 heard    RADIOLOGY REVIEWED  CXR 4/29: small L pl effusion/atelectasis     CT chest: < from: CT Chest No Cont (04.04.22 @ 16:52) >  FINDINGS:    AIRWAYS, LUNGS, PLEURA: Tracheal secretions. Small left greater than   right pleural effusions increased compared to 2/19/2022. Right-sided   pleural thickening. Lingular and left greater than right basilar   opacification, likely atelectasis.    MEDIASTINUM: Cardiomegaly. No pericardial effusion. Thoracic aorta normal   caliber.  No large mediastinal lymph nodes.    IMAGED ABDOMEN: Nonspecific perinephric stranding.    SOFT TISSUES: Unremarkable.    BONES: Unremarkable.    IMPRESSION:.    Small left greater than right bilateral pleural effusions increased in   size compared to 2/19/2022.    Lingular and left greater than right basilar opacification, likely   atelectasis.    --- End of Report ---    < end of copied text >

## 2022-05-05 NOTE — PROGRESS NOTE ADULT - ASSESSMENT
A/P    68 y/o M with history of CAD, s/p multiple PCI, with most recent 2/22 PCI of LAD in setting of NSTEMI, on ASA only (pradaxa), chronic atrial fibrillation maintained on Pradaxa, essential HTN, type 2 DM previously on oral medications but on insulin, diabetic neuropathy with chronic RIGHT LE venous stasis changes>left, LEFT foot ulcer seen by podiatry, past endarterectomy LEFT CEA in 2014 presenting with 1 week of decreased urine output, cystitis with hematuria     #ANT on CKD, new HD  -oliguric renal failure in setting of UTI/cystitis  -etiology ??  -New HD for uremia, renal failure  -sp rrt 4/12 for uncontrolled bleeding sp  right groin shiley removal  - monitor h/h - stable  -s/p L AVG 4/18  -s/p permacath placement 4/20  -renal f/ u    #AMS/Lethargy  -recent ams from pain meds  -AMS sp RRT 4/27  likely secondary to pain med   -repeat CT 4/27 unremarkable --  discontinued Percocet- improved   -med f/u     #Acute on chronic HFpEF  -stable  -continue volume removal with HD  -c/w  bb    #Chronic AF  -rates stable   -c/w metoprolol succ 100 mg qd  -c/w dilt cd 120mg daily    -c/w eliquis 5 mg BID for now - heme stable     #HTN  -stable  -c/w meds       #CAD, s/p PCI, most recent 2/2022  -stable, cont asa  -off plavix due to a/c indication  -statin     #R carotid stenosis  -cont med tx  -MRA noted with Greater than 75% stenosis of the right distal common carotid artery. Approximately 60% stenosis of the proximal left internal carotid artery.  -Continue ASA and Statin Therapy  -vasc outpt f/u Dr Pinto    dc to rehab

## 2022-05-05 NOTE — PROGRESS NOTE ADULT - SUBJECTIVE AND OBJECTIVE BOX
DATE OF SERVICE: 05-05-22 @ 10:30  CHIEF COMPLAINT:Patient is a 67y old  Male who presents with a chief complaint of Decreased urine output for the past week, leg oedema (04 May 2022 13:36)    	        PAST MEDICAL & SURGICAL HISTORY:  HTN (hypertension), benign    HLD (hyperlipidemia)    DM type 2 (diabetes mellitus, type 2)    TIA (transient ischemic attack)    Atrial fibrillation    MI (myocardial infarction)  circa 2014 and 2022.    CAD (coronary artery disease)    Neuropathy    Stage 3 chronic kidney disease    2019 novel coronavirus disease (COVID-19)  12/2021.  Received the COVID-19 vaccine x 2 as of April 2022.    Status post angioplasty with stent  LULU x 3 2/7/2014    S/P carotid endarterectomy  left          feels weak today  NECK: No pain or stiffness  RESPIRATORY: No cough, wheezing, chills or hemoptysis; No Shortness of Breath  CARDIOVASCULAR: No chest pain, palpitations, passing out, dizziness, or leg swelling  GASTROINTESTINAL: No abdominal or epigastric pain. No nausea, vomiting, or hematemesis; No diarrhea or constipation. No melena or hematochezia.  GENITOURINARY: No dysuria, frequency, hematuria, or incontinence  NEUROLOGICAL: No headaches, in    Medications:  MEDICATIONS  (STANDING):  allopurinol 100 milliGRAM(s) Oral daily  apixaban 5 milliGRAM(s) Oral two times a day  aspirin enteric coated 81 milliGRAM(s) Oral daily  atorvastatin 40 milliGRAM(s) Oral at bedtime  chlorhexidine 2% Cloths 1 Application(s) Topical <User Schedule>  chlorhexidine 4% Liquid 1 Application(s) Topical <User Schedule>  dextrose 50% Injectable 25 Gram(s) IV Push once  dextrose 50% Injectable 25 Gram(s) IV Push once  dextrose 50% Injectable 12.5 Gram(s) IV Push once  diltiazem    milliGRAM(s) Oral daily  epoetin naz-epbx (RETACRIT) Injectable 88959 Unit(s) IV Push once  hydrALAZINE 25 milliGRAM(s) Oral three times a day  insulin lispro (ADMELOG) corrective regimen sliding scale   SubCutaneous at bedtime  insulin lispro (ADMELOG) corrective regimen sliding scale   SubCutaneous three times a day before meals  lidocaine   4% Patch 1 Patch Transdermal daily  metoprolol succinate  milliGRAM(s) Oral daily  polyethylene glycol 3350 17 Gram(s) Oral two times a day  povidone iodine 10% Solution 1 Application(s) Topical daily  senna 2 Tablet(s) Oral at bedtime  sevelamer carbonate 1600 milliGRAM(s) Oral three times a day with meals    MEDICATIONS  (PRN):  bisacodyl 5 milliGRAM(s) Oral every 12 hours PRN Constipation  dextrose Oral Gel 15 Gram(s) Oral once PRN Blood Glucose LESS THAN 70 milliGRAM(s)/deciliter  QUEtiapine 25 milliGRAM(s) Oral at bedtime PRN agitation / insomnia  sodium chloride 0.9% lock flush 10 milliLiter(s) IV Push every 1 hour PRN Pre/post blood products, medications, blood draw, and to maintain line patency    	    PHYSICAL EXAM:  T(C): 36.6 (05-05-22 @ 04:52), Max: 36.8 (05-04-22 @ 12:23)  HR: 89 (05-05-22 @ 04:52) (76 - 95)  BP: 121/65 (05-05-22 @ 04:52) (104/60 - 126/56)  RR: 18 (05-05-22 @ 04:52) (17 - 18)  SpO2: 97% (05-05-22 @ 04:52) (97% - 100%)  Wt(kg): --  I&O's Summary    04 May 2022 07:01  -  05 May 2022 07:00  --------------------------------------------------------  IN: 100 mL / OUT: 0 mL / NET: 100 mL        Appearance: Normal	  HEENT:   Normal oral mucosa, PERRL, EOMI	    Cardiovascular: Normal S1 S2, No JVD  Respiratory: Lungs clear to auscultation	  Psychiatry: A & O  Gastrointestinal:  Soft, Non-tender, + BS	    Neurologic: Non-focal  Extremities: pvd    TELEMETRY: 	    ECG:  	  RADIOLOGY:  OTHER: 	  	  LABS:	 	    CARDIAC MARKERS:            05-04    132<L>  |  90<L>  |  22  ----------------------------<  209<H>  4.6   |  22  |  5.26<H>    Ca    9.2      04 May 2022 07:11      proBNP:   Lipid Profile:   HgA1c:   TSH:

## 2022-05-05 NOTE — PROGRESS NOTE ADULT - SUBJECTIVE AND OBJECTIVE BOX
CARDIOLOGY FOLLOW UP - Dr. Mazariegos  DATE OF SERVICE: 5/5/22    CC events noted episode of bradycardia while sleeping ? no events noted on tele   no cp or sob        REVIEW OF SYSTEMS:  CONSTITUTIONAL: No fever, weight loss, or fatigue  RESPIRATORY: No cough, wheezing, chills or hemoptysis; No Shortness of Breath  CARDIOVASCULAR: No chest pain, palpitations, passing out, dizziness, or leg swelling  GASTROINTESTINAL: No abdominal or epigastric pain. No nausea, vomiting, or hematemesis; No diarrhea or constipation. No melena or hematochezia.  VASCULAR: No edema     PHYSICAL EXAM:  T(C): 36.2 (05-05-22 @ 11:25), Max: 36.8 (05-04-22 @ 12:23)  HR: 62 (05-05-22 @ 11:25) (62 - 95)  BP: 122/67 (05-05-22 @ 11:25) (104/60 - 126/56)  RR: 17 (05-05-22 @ 11:25) (17 - 18)  SpO2: 95% (05-05-22 @ 11:25) (95% - 100%)  Wt(kg): --  I&O's Summary    04 May 2022 07:01  -  05 May 2022 07:00  --------------------------------------------------------  IN: 100 mL / OUT: 0 mL / NET: 100 mL    05 May 2022 07:01  -  05 May 2022 12:20  --------------------------------------------------------  IN: 150 mL / OUT: 0 mL / NET: 150 mL        Appearance: Normal	  Cardiovascular: Normal S1 S2 irreg   Respiratory: Lungs clear to auscultation	  Gastrointestinal:  Soft, Non-tender, + BS	  Extremities: Normal range of motion, No clubbing, cyanosis or edema      Home Medications:  allopurinol 100 mg oral tablet: 1 tab(s) orally once a day (03 Apr 2022 06:34)  aspirin 81 mg oral delayed release tablet: 1 tab(s) orally once a day (03 Apr 2022 06:34)  bumetanide 2 mg oral tablet: 1 tab(s) orally once a day (03 Apr 2022 06:34)  insulin glargine 100 units/mL subcutaneous solution: 25 unit(s) subcutaneous once a day (at bedtime) (03 Apr 2022 06:34)  metOLazone 5 mg oral tablet: 1 tab(s) orally 3 times a week (03 Apr 2022 06:34)  metoprolol succinate 50 mg oral tablet, extended release: 2 tab(s) orally once a day (03 Apr 2022 06:34)  NovoLOG 100 units/mL subcutaneous solution: subcutaneous 4 times a day (before meals and at bedtime) (03 Apr 2022 06:34)  NovoLOG FlexPen 100 units/mL injectable solution: 10 unit(s) subcutaneous 3 times a day (03 Apr 2022 06:34)  oxycodone-acetaminophen 5 mg-325 mg oral tablet: 1 tab(s) orally 2 times a day (03 Apr 2022 06:34)  Pradaxa 150 mg oral capsule: 1 cap(s) orally 2 times a day (03 Apr 2022 06:34)  pregabalin 100 mg oral capsule: 1 cap(s) orally 3 times a day (03 Apr 2022 06:34)      MEDICATIONS  (STANDING):  allopurinol 100 milliGRAM(s) Oral daily  apixaban 5 milliGRAM(s) Oral two times a day  aspirin enteric coated 81 milliGRAM(s) Oral daily  atorvastatin 40 milliGRAM(s) Oral at bedtime  chlorhexidine 2% Cloths 1 Application(s) Topical <User Schedule>  chlorhexidine 4% Liquid 1 Application(s) Topical <User Schedule>  dextrose 50% Injectable 25 Gram(s) IV Push once  dextrose 50% Injectable 25 Gram(s) IV Push once  dextrose 50% Injectable 12.5 Gram(s) IV Push once  diltiazem    milliGRAM(s) Oral daily  epoetin naz-epbx (RETACRIT) Injectable 55445 Unit(s) IV Push once  hydrALAZINE 25 milliGRAM(s) Oral three times a day  insulin lispro (ADMELOG) corrective regimen sliding scale   SubCutaneous at bedtime  insulin lispro (ADMELOG) corrective regimen sliding scale   SubCutaneous three times a day before meals  lidocaine   4% Patch 1 Patch Transdermal daily  metoprolol succinate  milliGRAM(s) Oral daily  polyethylene glycol 3350 17 Gram(s) Oral two times a day  povidone iodine 10% Solution 1 Application(s) Topical daily  senna 2 Tablet(s) Oral at bedtime  sevelamer carbonate 1600 milliGRAM(s) Oral three times a day with meals      TELEMETRY: 	afib hr 80     ECG:  	  RADIOLOGY:   DIAGNOSTIC TESTING:  [ ] Echocardiogram:  [ ]  Catheterization:  [ ] Stress Test:    OTHER: 	    LABS:	 	        05-04    132<L>  |  90<L>  |  22  ----------------------------<  209<H>  4.6   |  22  |  5.26<H>    Ca    9.2      04 May 2022 07:11

## 2022-05-05 NOTE — PROGRESS NOTE ADULT - PROBLEM SELECTOR PLAN 1
Likely 2nd to b/l pl effusions, atelectasis  -Suspect fluid overload given elevated proBNP, LE edema on admission   -Keep O>I with HD as tolerated   -Pt with hx of hydroPTX. CT chest in 2/2022 with pleural thickening, atelectasis. Findings at the time suspected to be chronic. CT A/P 4/2 with small b/l pl effusions L>R, pleural thickening  -CT chest now with small b/l pl effusion R>L, bibasilar atelectasis  -SOB resolved  -Keep sats >90% with supplemental O2 as needed (currently RA)  -CXR now with improved congestion   -D/c planning per primary team.

## 2022-05-05 NOTE — PROGRESS NOTE ADULT - SUBJECTIVE AND OBJECTIVE BOX
[facility]   [phone]  RENAL FOLLOW UP NOTE  --------------------------------------------------------------------------------  Chief Complaint:    24 hour events/subjective:    Patient seen and examined.   dizziness, +orthostatics while working with PT    PAST HISTORY  --------------------------------------------------------------------------------  No significant changes to PMH, PSH, FHx, SHx, unless otherwise noted    ALLERGIES & MEDICATIONS  --------------------------------------------------------------------------------  Allergies    nitrofurantoin (Nephrotoxicity)    Intolerances      Standing Inpatient Medications  allopurinol 100 milliGRAM(s) Oral daily  apixaban 5 milliGRAM(s) Oral two times a day  aspirin enteric coated 81 milliGRAM(s) Oral daily  atorvastatin 40 milliGRAM(s) Oral at bedtime  chlorhexidine 2% Cloths 1 Application(s) Topical <User Schedule>  chlorhexidine 4% Liquid 1 Application(s) Topical <User Schedule>  dextrose 50% Injectable 25 Gram(s) IV Push once  dextrose 50% Injectable 25 Gram(s) IV Push once  dextrose 50% Injectable 12.5 Gram(s) IV Push once  diltiazem    milliGRAM(s) Oral daily  epoetin naz-epbx (RETACRIT) Injectable 64262 Unit(s) IV Push once  hydrALAZINE 25 milliGRAM(s) Oral three times a day  insulin lispro (ADMELOG) corrective regimen sliding scale   SubCutaneous at bedtime  insulin lispro (ADMELOG) corrective regimen sliding scale   SubCutaneous three times a day before meals  lidocaine   4% Patch 1 Patch Transdermal daily  metoprolol succinate  milliGRAM(s) Oral daily  polyethylene glycol 3350 17 Gram(s) Oral two times a day  povidone iodine 10% Solution 1 Application(s) Topical daily  senna 2 Tablet(s) Oral at bedtime  sevelamer carbonate 1600 milliGRAM(s) Oral three times a day with meals  sodium chloride 0.9% Bolus 250 milliLiter(s) IV Bolus once  sodium chloride 0.9%. 1000 milliLiter(s) IV Continuous <Continuous>    PRN Inpatient Medications  bisacodyl 5 milliGRAM(s) Oral every 12 hours PRN  dextrose Oral Gel 15 Gram(s) Oral once PRN  QUEtiapine 25 milliGRAM(s) Oral at bedtime PRN  sodium chloride 0.9% lock flush 10 milliLiter(s) IV Push every 1 hour PRN      REVIEW OF SYSTEMS  --------------------------------------------------------------------------------    Gen: +dizziness. denies fevers/chills,  CVS: denies chest pain/palpitations  Resp: denies SOB/Cough  GI: Denies N/V/Abd pain  : Denies dysuria    VITALS/PHYSICAL EXAM  --------------------------------------------------------------------------------  T(C): 36.2 (05-05-22 @ 11:25), Max: 36.6 (05-05-22 @ 04:52)  HR: 73 (05-05-22 @ 12:25) (62 - 95)  BP: 148/55 (05-05-22 @ 12:25) (110/67 - 148/55)  RR: 17 (05-05-22 @ 11:25) (17 - 18)  SpO2: 95% (05-05-22 @ 11:25) (95% - 98%)  Wt(kg): --        05-04-22 @ 07:01  -  05-05-22 @ 07:00  --------------------------------------------------------  IN: 100 mL / OUT: 0 mL / NET: 100 mL    05-05-22 @ 07:01  -  05-05-22 @ 15:38  --------------------------------------------------------  IN: 150 mL / OUT: 0 mL / NET: 150 mL      Physical Exam:  	              Gen: pale, fatigue from working with PT  	Pulm: Decreased breath sounds b/l bases.  	CV: No JVD. IRR,   	Abd: +BS, soft, nontender, softly distended, obese               Extremity no edema              Extremity LE: + hyperpigmented bilateral LE with B/L  no edema              Vascular: R IJ HD catheter, L arm AVF     LABS/STUDIES  --------------------------------------------------------------------------------              10.6   11.12 >-----------<  304      [05-05-22 @ 13:04]              37.1     131  |  93  |  37  ----------------------------<  223      [05-05-22 @ 13:04]  5.0   |  20  |  7.60        Ca     9.2     [05-05-22 @ 13:04]      Mg     2.8     [05-05-22 @ 13:04]      Phos  5.1     [05-05-22 @ 13:04]            Creatinine Trend:  SCr 7.60 [05-05 @ 13:04]  SCr 5.26 [05-04 @ 07:11]  SCr 7.97 [05-03 @ 02:01]  SCr 6.37 [05-02 @ 06:28]  SCr 6.92 [04-30 @ 07:06]                  Iron 16, TIBC 239, %sat 7      [04-15-22 @ 10:16]  Ferritin 172      [04-15-22 @ 09:05]  PTH -- (Ca 8.3)      [04-15-22 @ 12:18]   150  PTH -- (Ca --)      [04-03-22 @ 23:06]   97  HbA1c 11.7      [11-07-19 @ 09:14]  TSH 1.98      [04-05-22 @ 05:19]    DEREJE: titer Negative, pattern Negative      [04-11-22 @ 16:24]  C3 Complement 61      [04-11-22 @ 16:56]  C4 Complement 28      [04-11-22 @ 16:56]  ANCA: cANCA Negative, pANCA Negative, atypical ANCA Indeterminate Method interference due to DEREJE fluorescence      [04-11-22 @ 16:24]  anti-GBM 3      [04-11-22 @ 19:27]  Free Light Chains: kappa 16.92, lambda 12.63, ratio = 1.34      [04-11 @ 16:56]  Immunofixation Serum: No Monoclonal Band Identified    Reference Range: None Detected      [04-11-22 @ 16:56]  SPEP Interpretation: Normal Electrophoresis Pattern      [04-11-22 @ 16:56]

## 2022-05-05 NOTE — PROGRESS NOTE ADULT - SUBJECTIVE AND OBJECTIVE BOX
[facility], Division of Infectious Diseases  ADRIANA Seals Y. Patel, S. Shah, G. Casimir  [phone]  ([phone] - weekdays after 5pm and weekends)    Name: DYLAN DEL CASTILLO  Age/Gender: 67y Male  MRN: [id number]    Interval History:  Patient seen this morning.   Notes reviewed.   No concerning overnight events.  Afebrile.   states he feels tired today    Allergies: nitrofurantoin (Nephrotoxicity)      Objective:  Vitals:   T(F): 97.2 (05-05-22 @ 11:25), Max: 97.9 (05-05-22 @ 04:52)  HR: 73 (05-05-22 @ 12:25) (62 - 95)  BP: 148/55 (05-05-22 @ 12:25) (110/67 - 148/55)  RR: 17 (05-05-22 @ 11:25) (17 - 18)  SpO2: 95% (05-05-22 @ 11:25) (95% - 98%)  Physical Examination:  General: no acute distress  HEENT: anicteric  Cardio: normal rate  Resp: breathing comfortably on RA  Abd: soft, ND  Skin: warm, dry, L foot wrapped w/ dressing  Lines:    Laboratory Studies:  CBC:   WBC Trend:  13.28 05-03-22 @ 02:01  14.26 05-02-22 @ 06:28  16.22 05-01-22 @ 07:29  12.06 04-30-22 @ 07:06  15.61 04-29-22 @ 07:22    CMP: 05-04    132<L>  |  90<L>  |  22  ----------------------------<  209<H>  4.6   |  22  |  5.26<H>    Ca    9.2      04 May 2022 07:11              Microbiology: reviewed     Culture - Blood (collected 04-30-22 @ 16:21)  Source: .Blood Blood-Peripheral  Preliminary Report (05-01-22 @ 17:01):    No growth to date.    Culture - Blood (collected 04-30-22 @ 16:21)  Source: .Blood Blood-Peripheral  Preliminary Report (05-01-22 @ 17:01):    No growth to date.      Radiology: reviewed     Medications:  allopurinol 100 milliGRAM(s) Oral daily  apixaban 5 milliGRAM(s) Oral two times a day  aspirin enteric coated 81 milliGRAM(s) Oral daily  atorvastatin 40 milliGRAM(s) Oral at bedtime  bisacodyl 5 milliGRAM(s) Oral every 12 hours PRN  chlorhexidine 2% Cloths 1 Application(s) Topical <User Schedule>  chlorhexidine 4% Liquid 1 Application(s) Topical <User Schedule>  dextrose 50% Injectable 25 Gram(s) IV Push once  dextrose 50% Injectable 25 Gram(s) IV Push once  dextrose 50% Injectable 12.5 Gram(s) IV Push once  dextrose Oral Gel 15 Gram(s) Oral once PRN  diltiazem    milliGRAM(s) Oral daily  epoetin naz-epbx (RETACRIT) Injectable 95419 Unit(s) IV Push once  hydrALAZINE 25 milliGRAM(s) Oral three times a day  insulin lispro (ADMELOG) corrective regimen sliding scale   SubCutaneous three times a day before meals  insulin lispro (ADMELOG) corrective regimen sliding scale   SubCutaneous at bedtime  lidocaine   4% Patch 1 Patch Transdermal daily  metoprolol succinate  milliGRAM(s) Oral daily  polyethylene glycol 3350 17 Gram(s) Oral two times a day  povidone iodine 10% Solution 1 Application(s) Topical daily  QUEtiapine 25 milliGRAM(s) Oral at bedtime PRN  senna 2 Tablet(s) Oral at bedtime  sevelamer carbonate 1600 milliGRAM(s) Oral three times a day with meals  sodium chloride 0.9% Bolus 250 milliLiter(s) IV Bolus once  sodium chloride 0.9% lock flush 10 milliLiter(s) IV Push every 1 hour PRN  sodium chloride 0.9%. 1000 milliLiter(s) IV Continuous <Continuous>    Antimicrobials:

## 2022-05-05 NOTE — PROGRESS NOTE ADULT - ASSESSMENT
Patient is a 67 year old male with PMH of CAD with past multiple PCI, with most recent discharge from [facility] in Feb 2022 for NSTEMI with LULU of an 85% prox LAD lesion with patient on ASA and now off Plavix per cardiology (only for one month), chronic afib on Pradaxa, HTN, type 2 DM on insulin, diabetic neuropathy with chronic RIGHT LE venous stasis changes>left, LEFT foot ulcer seen by podiatry, past endarterectomy LEFT CEA in 2014 who presented with UTI with hematuria diagnosed at urgent care and now with decreased urine output.    4/18 for LUE AVF creation, possible graft placement.    Leukocytosis  pt has had fluctuations throughout hospital course  all cultures remain NGTD including 4/30  monitor stool output and consistency  CXR w/o evidence of focal consolidation and pt w/o respiratory symptoms  pt no longer makes urine, denies abd pain or suprapubic tenderness  no tenderness or erythema at IV sites  no frontal, maxillary or ethmoid sinus tenderness on exam  pt w/ poor dentitions  leukocytosis slowly improving, likely reactive  no objection to discharge from ID standpoint    Acute cystitis  S/p ceftriaxone x7 day course completed 4/9    ANT on CKD3  Nephrology following, appreciate recs  s/p placement of shiley and initiation of HD; s/p permcath placement 4/20  monitor Cr   renally dose meds   Avoid nephrotoxic agents    B/l LE edema with chronic venous stasis  Acute on chronic HFpEF  legs cool to touch, nontender, chronic changes with no sign of infection/cellulitis  has wound on bottom of L foot- evaluated by team last week- dry and does not appear infected either  attempted to evaluate wound, however, patient refused exam     DM2 with neuropathy  Blood glucose management per primary team.    Eugene Meeks M.D.  [facility], Division of Infectious Diseases  [phone]  After 5pm on weekdays and all day on weekends - please call [phone]

## 2022-05-05 NOTE — PROGRESS NOTE ADULT - ASSESSMENT
·  Problem: Acute kidney injury superimposed on CKD. pt currently on hd  to cont as per renal  avf done    perm cath done     Problem/Plan - 2:  ·  Problem: Chronic atrial fibrillation. c/w a/c  cards f/u     Problem/Plan - 3:  ·  Problem: Cystitis.   ID follow up appreciated        Problem/Plan - 4:  ·  Problem: Type 2 diabetes mellitus. c/w insulin   endo f/u      Problem/Plan - 5:  ·  Problem: CAD (coronary artery disease).   rapid a fib  meds adjusted  hr better  c/w a/c       cards f/u     constipation better  bowel regimen      gout pain   control/pain    rehab planning

## 2022-05-05 NOTE — CHART NOTE - NSCHARTNOTEFT_GEN_A_CORE
Nutrition Follow Up Note  Patient seen for: Follow Up    Chart reviewed, events noted: As per chart, "pt is a 66 y/o M with PMH of pleural effusion, CAD,HTN, HLD, TIA, Afib, MI, nephropathy, CKD,  stage3, cystitis, AMS, ESRD on HD, DM, NSTEMI,  chronic RLE wound, L carotid stenosis s/p endarterectomy (). Presented to ED with hematuria and decreased urine output over the past week, was prescribed Nitrofurantoin from an urgent care center. Also with increase in B/L LE edema, weight gain, and orthopnea. On admission, labs notable for creatinine of 7.44 (noted to be 2.2 in 2022) - urgent HD initiated 2nd to uremia. Pulmonary called to consult for mild SOB, hx of abnormal CT chest".     Source: [] Patient       [x] Medical record        [x] RN        [] Family at bedside       [] Other:    -If unable to interview patient: [] Trach/Vent/BiPAP  [x] Disoriented/confused/inappropriate to interview    Diet Order: Diet, Renal Restrictions:   For patients receiving Renal Replacement - No Protein Restr, No Conc K, No Conc Phos, Low Sodium  Consistent Carbohydrate {No Snacks} (CSTCHO)  DASH/TLC {Sodium & Cholesterol Restricted} (DASH)  1500mL Fluid Restriction ([id number])  Supplement Feeding Modality:  Oral  Nepro Cans or Servings Per Day:  1       Frequency:  Two Times a day (22 @ 16:24) [Active]      - Is current order appropriate/adequate? [] Yes  [x]  No:     - PO intake :   [] >75%  Adequate    [] 50-75%  Fair       [x] <50%  Poor    - Nutrition-related concerns: Pt with AMS unable to provide information . Per RN, Pt with poor appetite and PO intake, consuming 0-25% of all meals. Per RN, pt receives/consuming  30-50% of Nepro x 1 daily   -RN  denies pt with  N/V/D/C and any swallowing or chewing difficulties.       GI:  Last BM .   Bowel Regimen? [x] Yes  Senna, Miralax.       Weights:   Daily Weight in k.2 (-), Weight in k.2 (-), Weight in k.6 (), Weight in k.6 (-30), Weight in k.1 (-), Weight in k.6 (-)- weight fluctuation noted, pt on HD and with edema.     Nutritionally Pertinent Medications:   atorvastatin   Admelog   polyethylene glycol  senna  sodium chloride 0.9%  Dulcolax    Labs:  Finger sticks: 227(), 195 (), 191(), 220 ()    Na: 132 <L>  Cr: 5.26<H>  eGFR: 11<L>  HbA1c: 10.1% (2)    Skin per nursing documentation: No pressure injury as per flow sheet  Edema per nursing documentation: 1+ kiara leg    Estimated Needs:   [] no change since previous assessment  [x] recalculated:  Estimated  Energy Needs:  78.2kg x 25-30kcal/yi=5177-2114nptx/day  Estimated Protein Needs:78.2 x 1.2-1.4g/kg= 93.8-109.5g/day  Estimated Fluid Needs: deferred to team   IBW (78.2kg) was used for energy and protein calculations.       Previous Nutrition Diagnosis: Inadequate oral intake   Nutrition Diagnosis is: [x] ongoing  [] resolved [] not applicable     New Nutrition Diagnosis: Increased nutrient needs related to physiological demand for nutrients as evidenced by pt on HD.      Nutrition Care Plan:  [] In Progress  [] Achieved  [] Not applicable    Nutrition Interventions:     Education Provided:       [] Yes:  [x] No: Pt with AMS     Recommendations:      1) Recommend consistent carbohydrate with snacks and renal diet.   2) Recommend Nephro-Cheyanne if no contraindications, continue with Nepro x 1 daily.   3) Will continue to monitor PO intake, diet, weight, GI status, and labs.  4) Made aware RD and Dietetic Intern remain available.      Monitoring and Evaluation:   Continue to monitor nutritional intake, tolerance to diet prescription, weights, labs, skin integrity      RD remains available upon request and will follow up per protocol.  Aleksandar Colon Dietetic Intern Nutrition Follow Up Note  Patient seen for: Follow Up    Chart reviewed, events noted: As per chart, "pt is a 66 y/o M with PMH of pleural effusion, CAD,HTN, HLD, TIA, Afib, MI, nephropathy, CKD,  stage3, cystitis, AMS, DM, NSTEMI,  chronic RLE wound, L carotid stenosis s/p endarterectomy (). Presented to ED with hematuria and decreased urine output over the past week, was prescribed Nitrofurantoin from an urgent care center. Also with increase in B/L LE edema, weight gain, and orthopnea. On admission, labs notable for creatinine of 7.44 (noted to be 2.2 in 2022) - urgent HD initiated 2nd to uremia. Pulmonary called to consult for mild SOB, hx of abnormal CT chest. Acute kidney injury superimposed on CKD. Tunneled HD catheter placed in IR, continue on HD".     Source: [] Patient       [x] Medical record        [x] RN        [] Family at bedside       [] Other:    -If unable to interview patient: [] Trach/Vent/BiPAP  [x] Disoriented/confused/inappropriate to interview    Diet Order: Diet, Renal Restrictions:   For patients receiving Renal Replacement - No Protein Restr, No Conc K, No Conc Phos, Low Sodium  Consistent Carbohydrate {No Snacks} (CSTCHO)  DASH/TLC {Sodium & Cholesterol Restricted} (DASH)  1500mL Fluid Restriction ([id number])  Supplement Feeding Modality:  Oral  Nepro Cans or Servings Per Day:  1       Frequency:  Two Times a day (22 @ 16:24) [Active]      - Is current order appropriate/adequate? [] Yes  [x]  No:     - PO intake :   [] >75%  Adequate    [] 50-75%  Fair       [x] <50%  Poor    - Nutrition-related concerns: Pt with AMS unable to provide information . Per RN, Pt with poor appetite and PO intake, consuming 0-25% of all meals. Per RN, pt receives/consuming  30-50% of Nepro x 1 daily   -RN  denies pt with  N/V/D/C and any swallowing or chewing difficulties.       GI:  Last BM .   Bowel Regimen? [x] Yes  Senna, Miralax.       Weights:   Admission weight: 138.8kg ( 4/3)  Daily Weight in k.2 (-03), Weight in k.2 (-), Weight in k.6 (-), Weight in k.6 (-), Weight in k.1 (-), Weight in k.6 (-)- weight fluctuation noted, pt on HD and with edema.     Nutritionally Pertinent Medications:   atorvastatin   Admelog   polyethylene glycol  senna  sodium chloride 0.9%  Dulcolax  Renvela    Labs:  Finger sticks: 227(), 195 (), 191(), 220 ()    Na: 132 <L>  Cr: 5.26<H>  eGFR: 11<L>  HbA1c: 10.1% ()    Skin per nursing documentation: No pressure injury as per flow sheet  Edema per nursing documentation: 1+ kiara foot    Estimated Needs:   [] no change since previous assessment  [x] recalculated:  Estimated  Energy Needs:  78.2kg x 30-35kcal/rp=7915-1494ozlq/day  Estimated Protein Needs:78.2 x 1.2-1.4g/kg= 93.8-109.5g/day  Estimated Fluid Needs: deferred to team   IBW (78.2kg) was used for energy and protein calculations.       Previous Nutrition Diagnosis: Inadequate oral intake, Increased nutrient needs  Nutrition Diagnosis is: [x] ongoing  [] resolved [] not applicable     Severe; acute malnutrition related to decrease appetite as evidenced by 17.7% weight loss x 1 month, PO intake <50% >5 days.          Nutrition Care Plan:  [] In Progress  [] Achieved  [] Not applicable    Nutrition Interventions:     Education Provided:       [] Yes:  [x] No: Pt with AMS     Recommendations:      1) Recommend consistent carbohydrate with snacks and renal diet.   2) Recommend Nephro-Cheyanne if no contraindications, continue with Nepro x 1 daily.   3) Will continue to monitor PO intake, diet, weight, GI status, and labs.  4) Made aware RD and Dietetic Intern remain available.      Monitoring and Evaluation:   Continue to monitor nutritional intake, tolerance to diet prescription, weights, labs, skin integrity      RD remains available upon request and will follow up per protocol.  Aleksandar Colon Dietetic Intern Nutrition Follow Up Note  Patient seen for: Follow Up    Chart reviewed, events noted: As per chart, "pt is a 66 y/o M with PMH of pleural effusion, CAD,HTN, HLD, TIA, Afib, MI, nephropathy, CKD,  stage3, cystitis, AMS, DM, NSTEMI,  chronic RLE wound, L carotid stenosis s/p endarterectomy (). Presented to ED with hematuria and decreased urine output over the past week, was prescribed Nitrofurantoin from an urgent care center. Also with increase in B/L LE edema, weight gain, and orthopnea. On admission, labs notable for creatinine of 7.44 (noted to be 2.2 in 2022) - urgent HD initiated 2nd to uremia. Pulmonary called to consult for mild SOB, hx of abnormal CT chest. Acute kidney injury superimposed on CKD. Tunneled HD catheter placed in IR, continue on HD".     Source: [] Patient       [x] Medical record        [x] RN        [] Family at bedside       [] Other:    -If unable to interview patient: [] Trach/Vent/BiPAP  [x] Disoriented/confused/inappropriate to interview    Diet Order: Diet, Renal Restrictions:   For patients receiving Renal Replacement - No Protein Restr, No Conc K, No Conc Phos, Low Sodium  Consistent Carbohydrate {No Snacks} (CSTCHO)  DASH/TLC {Sodium & Cholesterol Restricted} (DASH)  1500mL Fluid Restriction ([id number])  Supplement Feeding Modality:  Oral  Nepro Cans or Servings Per Day:  1       Frequency:  Two Times a day (22 @ 16:24) [Active]      - Is current order appropriate/adequate? [] Yes  [x]  No:     - PO intake :   [] >75%  Adequate    [] 50-75%  Fair       [x] <50%  Poor    - Nutrition-related concerns: Pt with AMS unable to provide information . Per RN, Pt with poor appetite and PO intake, consuming 0-25% of all meals. Per RN, pt receives/consuming  30-50% of Nepro x 1 daily   -RN  denies pt with  N/V/D/C and any swallowing or chewing difficulties.       GI:  Last BM .   Bowel Regimen? [x] Yes  Senna, Miralax.       Weights:   Admission weight: 138.8kg ( 4/3)  Daily Weight in k.2 (-03), Weight in k.2 (-), Weight in k.6 (-), Weight in k.6 (-), Weight in k.1 (-), Weight in k.6 (-)- weight fluctuation noted, pt on HD and with edema.     Nutritionally Pertinent Medications:   atorvastatin   Admelog   polyethylene glycol  senna  sodium chloride 0.9%  Dulcolax  Renvela    Labs:  Finger sticks: 227(), 195 (), 191(), 220 ()    Na: 132 <L>  Cr: 5.26<H>  eGFR: 11<L>  B<H>  HbA1c: 10.1% ()    Skin per nursing documentation: No pressure injury as per flow sheet  Edema per nursing documentation: 1+ kiara foot ( previously +3 kiara foot, ankle, and leg edema)    Estimated Needs:   [] no change since previous assessment  [x] recalculated:  Estimated  Energy Needs:  78.2kg x 30-35kcal/ad=6704-0719mvvi/day  Estimated Protein Needs:78.2 x 1.2-1.4g/kg= 93.8-109.5g/day  Estimated Fluid Needs: deferred to team   IBW (78.2kg) was used for energy and protein calculations. ( recalculated to use IBW)      Previous Nutrition Diagnosis: Inadequate oral intake, Increased nutrient needs  Nutrition Diagnosis is: [x] ongoing  [] resolved [] not applicable     Severe; acute malnutrition related to decrease appetite as evidenced by 17.7% weight loss x 1 month, PO intake <50% >5 days, fluid accumulations.         Nutrition Care Plan:  [x] In Progress  [] Achieved  [] Not applicable    Nutrition Interventions:     Education Provided:       [] Yes:  [x] No: Pt with AMS     Recommendations:      1) Recommend consistent carbohydrate with snacks and renal diet. Defer fluid to team.   2) Recommend Nephro-Cheyanne if no contraindications, continue with Nepro x 1 daily.   3) Will continue to monitor PO intake, diet, weight, GI status, and labs.  4) Made aware RD and Dietetic Intern remain available.  5) Malnutrition sticker added on chart.        Monitoring and Evaluation:   Continue to monitor nutritional intake, tolerance to diet prescription, weights, labs, skin integrity      RD remains available upon request and will follow up per protocol.  Aleksandar Colon Dietetic Intern

## 2022-05-06 NOTE — PROGRESS NOTE ADULT - SUBJECTIVE AND OBJECTIVE BOX
[facility], Division of Infectious Diseases  ADRIANA Seals Y. Patel, S. Shah, G. Casimir  [phone]  ([phone] - weekdays after 5pm and weekends)    Name: DYLAN DEL CASTILLO  Age/Gender: 67y Male  MRN: [id number]    Interval History:  Patient seen this morning.   Notes reviewed.   No concerning overnight events.  Afebrile.   denies abd pain, n/v  spouse states patient is constantly sleeping    Allergies: nitrofurantoin (Nephrotoxicity)      Objective:  Vitals:   T(F): 98.4 (05-06-22 @ 11:30), Max: 98.4 (05-06-22 @ 11:30)  HR: 82 (05-06-22 @ 11:30) (82 - 100)  BP: 146/70 (05-06-22 @ 11:30) (126/36 - 146/70)  RR: 18 (05-06-22 @ 11:30) (17 - 18)  SpO2: 95% (05-06-22 @ 11:30) (95% - 99%)  Physical Examination:  General: no acute distress  HEENT: anicteric  Cardio: normal rate  Resp: breathing comfortably on RA  Abd: soft, ND  Skin: warm, dry, L foot wrapped w/ dressing  Lines:    Laboratory Studies:  CBC:                       10.2   11.85 )-----------( 272      ( 06 May 2022 07:57 )             33.5     WBC Trend:  11.85 05-06-22 @ 07:57  11.12 05-05-22 @ 13:04  13.28 05-03-22 @ 02:01  14.26 05-02-22 @ 06:28  16.22 05-01-22 @ 07:29  12.06 04-30-22 @ 07:06    CMP: 05-06    131<L>  |  91<L>  |  18  ----------------------------<  166<H>  3.9   |  25  |  4.44<H>    Ca    8.8      06 May 2022 07:57  Phos  5.1     05-05  Mg     2.1     05-06              Microbiology: reviewed     Culture - Blood (collected 04-30-22 @ 16:21)  Source: .Blood Blood-Peripheral  Final Report (05-05-22 @ 17:01):    No Growth Final    Culture - Blood (collected 04-30-22 @ 16:21)  Source: .Blood Blood-Peripheral  Final Report (05-05-22 @ 17:01):    No Growth Final      Radiology: reviewed     Medications:  allopurinol 100 milliGRAM(s) Oral daily  apixaban 5 milliGRAM(s) Oral two times a day  aspirin enteric coated 81 milliGRAM(s) Oral daily  atorvastatin 40 milliGRAM(s) Oral at bedtime  bisacodyl 5 milliGRAM(s) Oral every 12 hours PRN  chlorhexidine 4% Liquid 1 Application(s) Topical <User Schedule>  dextrose 50% Injectable 25 Gram(s) IV Push once  dextrose 50% Injectable 25 Gram(s) IV Push once  dextrose 50% Injectable 12.5 Gram(s) IV Push once  dextrose Oral Gel 15 Gram(s) Oral once PRN  diltiazem    milliGRAM(s) Oral daily  hydrALAZINE 25 milliGRAM(s) Oral three times a day  insulin lispro (ADMELOG) corrective regimen sliding scale   SubCutaneous at bedtime  insulin lispro (ADMELOG) corrective regimen sliding scale   SubCutaneous three times a day before meals  lidocaine   4% Patch 1 Patch Transdermal daily  metoprolol succinate  milliGRAM(s) Oral daily  polyethylene glycol 3350 17 Gram(s) Oral two times a day  povidone iodine 10% Solution 1 Application(s) Topical daily  QUEtiapine 25 milliGRAM(s) Oral at bedtime PRN  senna 2 Tablet(s) Oral at bedtime  sevelamer carbonate 1600 milliGRAM(s) Oral three times a day with meals  sodium chloride 0.9% lock flush 10 milliLiter(s) IV Push every 1 hour PRN  sodium chloride 0.9%. 1000 milliLiter(s) IV Continuous <Continuous>    Antimicrobials:

## 2022-05-06 NOTE — PROGRESS NOTE ADULT - SUBJECTIVE AND OBJECTIVE BOX
CARDIOLOGY FOLLOW UP - Dr. Mazariegos  DATE OF SERVICE: 5/6/22     CC no cp or sob         REVIEW OF SYSTEMS:  CONSTITUTIONAL: No fever, weight loss, or fatigue  RESPIRATORY: No cough, wheezing, chills or hemoptysis; No Shortness of Breath  CARDIOVASCULAR: No chest pain, palpitations, passing out, dizziness, or leg swelling  GASTROINTESTINAL: No abdominal or epigastric pain. No nausea, vomiting, or hematemesis; No diarrhea or constipation. No melena or hematochezia.  VASCULAR: No edema     PHYSICAL EXAM:  T(C): 36.9 (05-06-22 @ 11:30), Max: 36.9 (05-06-22 @ 11:30)  HR: 82 (05-06-22 @ 11:30) (73 - 100)  BP: 146/70 (05-06-22 @ 11:30) (126/36 - 148/55)  RR: 18 (05-06-22 @ 11:30) (17 - 18)  SpO2: 95% (05-06-22 @ 11:30) (95% - 99%)  Wt(kg): --  I&O's Summary    05 May 2022 07:01  -  06 May 2022 07:00  --------------------------------------------------------  IN: 150 mL / OUT: 0 mL / NET: 150 mL    06 May 2022 07:01  -  06 May 2022 11:47  --------------------------------------------------------  IN: 150 mL / OUT: 0 mL / NET: 150 mL        Appearance: Normal	  Cardiovascular: Normal S1 S2, irreg  Respiratory: Lungs clear to auscultation	  Gastrointestinal:  Soft, Non-tender, + BS	  Extremities: Normal range of motion, No clubbing, cyanosis or edema      Home Medications:  allopurinol 100 mg oral tablet: 1 tab(s) orally once a day (03 Apr 2022 06:34)  aspirin 81 mg oral delayed release tablet: 1 tab(s) orally once a day (03 Apr 2022 06:34)  bumetanide 2 mg oral tablet: 1 tab(s) orally once a day (03 Apr 2022 06:34)  insulin glargine 100 units/mL subcutaneous solution: 25 unit(s) subcutaneous once a day (at bedtime) (03 Apr 2022 06:34)  metOLazone 5 mg oral tablet: 1 tab(s) orally 3 times a week (03 Apr 2022 06:34)  metoprolol succinate 50 mg oral tablet, extended release: 2 tab(s) orally once a day (03 Apr 2022 06:34)  NovoLOG 100 units/mL subcutaneous solution: subcutaneous 4 times a day (before meals and at bedtime) (03 Apr 2022 06:34)  NovoLOG FlexPen 100 units/mL injectable solution: 10 unit(s) subcutaneous 3 times a day (03 Apr 2022 06:34)  oxycodone-acetaminophen 5 mg-325 mg oral tablet: 1 tab(s) orally 2 times a day (03 Apr 2022 06:34)  Pradaxa 150 mg oral capsule: 1 cap(s) orally 2 times a day (03 Apr 2022 06:34)  pregabalin 100 mg oral capsule: 1 cap(s) orally 3 times a day (03 Apr 2022 06:34)      MEDICATIONS  (STANDING):  allopurinol 100 milliGRAM(s) Oral daily  apixaban 5 milliGRAM(s) Oral two times a day  aspirin enteric coated 81 milliGRAM(s) Oral daily  atorvastatin 40 milliGRAM(s) Oral at bedtime  chlorhexidine 4% Liquid 1 Application(s) Topical <User Schedule>  dextrose 50% Injectable 25 Gram(s) IV Push once  dextrose 50% Injectable 25 Gram(s) IV Push once  dextrose 50% Injectable 12.5 Gram(s) IV Push once  diltiazem    milliGRAM(s) Oral daily  hydrALAZINE 25 milliGRAM(s) Oral three times a day  insulin lispro (ADMELOG) corrective regimen sliding scale   SubCutaneous at bedtime  insulin lispro (ADMELOG) corrective regimen sliding scale   SubCutaneous three times a day before meals  lidocaine   4% Patch 1 Patch Transdermal daily  metoprolol succinate  milliGRAM(s) Oral daily  polyethylene glycol 3350 17 Gram(s) Oral two times a day  povidone iodine 10% Solution 1 Application(s) Topical daily  senna 2 Tablet(s) Oral at bedtime  sevelamer carbonate 1600 milliGRAM(s) Oral three times a day with meals  sodium chloride 0.9%. 1000 milliLiter(s) (50 mL/Hr) IV Continuous <Continuous>      TELEMETRY: afib hr 80s 	    ECG:  	  RADIOLOGY:   DIAGNOSTIC TESTING:  [ ] Echocardiogram:  [ ]  Catheterization:  [ ] Stress Test:    OTHER: 	    LABS:	 	                            10.2   11.85 )-----------( 272      ( 06 May 2022 07:57 )             33.5     05-06    131<L>  |  91<L>  |  18  ----------------------------<  166<H>  3.9   |  25  |  4.44<H>    Ca    8.8      06 May 2022 07:57  Phos  5.1     05-05  Mg     2.1     05-06

## 2022-05-06 NOTE — PROGRESS NOTE ADULT - SUBJECTIVE AND OBJECTIVE BOX
DATE OF SERVICE: 05-06-22 @ 15:26  CHIEF COMPLAINT:Patient is a 67y old  Male who presents with a chief complaint of Decreased urine output for the past week, leg oedema (06 May 2022 15:08)    	        PAST MEDICAL & SURGICAL HISTORY:  HTN (hypertension), benign    HLD (hyperlipidemia)    DM type 2 (diabetes mellitus, type 2)    TIA (transient ischemic attack)    Atrial fibrillation    MI (myocardial infarction)  circa 2014 and 2022.    CAD (coronary artery disease)    Neuropathy    Stage 3 chronic kidney disease    2019 novel coronavirus disease (COVID-19)  12/2021.  Received the COVID-19 vaccine x 2 as of April 2022.    Status post angioplasty with stent  LULU x 3 2/7/2014    S/P carotid endarterectomy  left          weak   NECK: No pain or stiffness  RESPIRATORY: No cough, wheezing, chills or hemoptysis; No Shortness of Breath  CARDIOVASCULAR: No chest pain, palpitations, passing out, dizziness, or leg swelling  GASTROINTESTINAL: No abdominal or epigastric pain. No nausea, vomiting,     NEUROLOGICAL: No headaches,     Medications:  MEDICATIONS  (STANDING):  allopurinol 100 milliGRAM(s) Oral daily  apixaban 5 milliGRAM(s) Oral two times a day  aspirin enteric coated 81 milliGRAM(s) Oral daily  atorvastatin 40 milliGRAM(s) Oral at bedtime  chlorhexidine 4% Liquid 1 Application(s) Topical <User Schedule>  dextrose 50% Injectable 25 Gram(s) IV Push once  dextrose 50% Injectable 25 Gram(s) IV Push once  dextrose 50% Injectable 12.5 Gram(s) IV Push once  diltiazem    milliGRAM(s) Oral daily  hydrALAZINE 25 milliGRAM(s) Oral three times a day  insulin lispro (ADMELOG) corrective regimen sliding scale   SubCutaneous at bedtime  insulin lispro (ADMELOG) corrective regimen sliding scale   SubCutaneous three times a day before meals  lidocaine   4% Patch 1 Patch Transdermal daily  metoprolol succinate  milliGRAM(s) Oral daily  polyethylene glycol 3350 17 Gram(s) Oral two times a day  povidone iodine 10% Solution 1 Application(s) Topical daily  senna 2 Tablet(s) Oral at bedtime  sevelamer carbonate 1600 milliGRAM(s) Oral three times a day with meals  sodium chloride 0.9%. 1000 milliLiter(s) (50 mL/Hr) IV Continuous <Continuous>    MEDICATIONS  (PRN):  bisacodyl 5 milliGRAM(s) Oral every 12 hours PRN Constipation  dextrose Oral Gel 15 Gram(s) Oral once PRN Blood Glucose LESS THAN 70 milliGRAM(s)/deciliter  QUEtiapine 25 milliGRAM(s) Oral at bedtime PRN agitation / insomnia  sodium chloride 0.9% lock flush 10 milliLiter(s) IV Push every 1 hour PRN Pre/post blood products, medications, blood draw, and to maintain line patency    	    PHYSICAL EXAM:  T(C): 36.9 (05-06-22 @ 11:30), Max: 36.9 (05-06-22 @ 11:30)  HR: 82 (05-06-22 @ 11:30) (82 - 100)  BP: 146/70 (05-06-22 @ 11:30) (126/36 - 146/70)  RR: 18 (05-06-22 @ 11:30) (17 - 18)  SpO2: 95% (05-06-22 @ 11:30) (95% - 99%)  Wt(kg): --  I&O's Summary    05 May 2022 07:01  -  06 May 2022 07:00  --------------------------------------------------------  IN: 150 mL / OUT: 0 mL / NET: 150 mL    06 May 2022 07:01  -  06 May 2022 15:26  --------------------------------------------------------  IN: 150 mL / OUT: 0 mL / NET: 150 mL        Appearance: Normal	  HEENT:   Normal oral mucosa, PERRL, EOMI	  Lymphatic: No lymphadenopathy  Cardiovascular: Normal S1 S2, No JVD,  Respiratory: Lungs clear to auscultation	  Psychiatry: A & O   Gastrointestinal:  Soft, Non-tender, + BS	    Neurologic: Non-focal  Extremities: dec rom    pv d    TELEMETRY: 	    ECG:  	  RADIOLOGY:  OTHER: 	  	  LABS:	 	    CARDIAC MARKERS:                                10.2   11.85 )-----------( 272      ( 06 May 2022 07:57 )             33.5     05-06    131<L>  |  91<L>  |  18  ----------------------------<  166<H>  3.9   |  25  |  4.44<H>    Ca    8.8      06 May 2022 07:57  Phos  5.1     05-05  Mg     2.1     05-06      proBNP:   Lipid Profile:   HgA1c:   TSH:

## 2022-05-06 NOTE — PROVIDER CONTACT NOTE (OTHER) - ASSESSMENT
Pt AOx2 at baseline, had BMx1 during the day Pt AOx2 at baseline, had BMx1 during the day. Pt and spouse at bedside refused senna.

## 2022-05-06 NOTE — PROGRESS NOTE ADULT - SUBJECTIVE AND OBJECTIVE BOX
[facility]   [phone]  RENAL FOLLOW UP NOTE  --------------------------------------------------------------------------------  Chief Complaint:    24 hour events/subjective:    Patient seen and examined.   confused    PAST HISTORY  --------------------------------------------------------------------------------  No significant changes to PMH, PSH, FHx, SHx, unless otherwise noted    ALLERGIES & MEDICATIONS  --------------------------------------------------------------------------------  Allergies    nitrofurantoin (Nephrotoxicity)    Intolerances      Standing Inpatient Medications  allopurinol 100 milliGRAM(s) Oral daily  apixaban 5 milliGRAM(s) Oral two times a day  aspirin enteric coated 81 milliGRAM(s) Oral daily  atorvastatin 40 milliGRAM(s) Oral at bedtime  chlorhexidine 4% Liquid 1 Application(s) Topical <User Schedule>  dextrose 50% Injectable 25 Gram(s) IV Push once  dextrose 50% Injectable 25 Gram(s) IV Push once  dextrose 50% Injectable 12.5 Gram(s) IV Push once  diltiazem    milliGRAM(s) Oral daily  hydrALAZINE 25 milliGRAM(s) Oral three times a day  insulin lispro (ADMELOG) corrective regimen sliding scale   SubCutaneous at bedtime  insulin lispro (ADMELOG) corrective regimen sliding scale   SubCutaneous three times a day before meals  lidocaine   4% Patch 1 Patch Transdermal daily  metoprolol succinate  milliGRAM(s) Oral daily  polyethylene glycol 3350 17 Gram(s) Oral two times a day  povidone iodine 10% Solution 1 Application(s) Topical daily  senna 2 Tablet(s) Oral at bedtime  sevelamer carbonate 1600 milliGRAM(s) Oral three times a day with meals  sodium chloride 0.9%. 1000 milliLiter(s) IV Continuous <Continuous>    PRN Inpatient Medications  bisacodyl 5 milliGRAM(s) Oral every 12 hours PRN  dextrose Oral Gel 15 Gram(s) Oral once PRN  QUEtiapine 25 milliGRAM(s) Oral at bedtime PRN  sodium chloride 0.9% lock flush 10 milliLiter(s) IV Push every 1 hour PRN      REVIEW OF SYSTEMS  --------------------------------------------------------------------------------    patient is unable to give ROS    VITALS/PHYSICAL EXAM  --------------------------------------------------------------------------------  T(C): 36.3 (05-06-22 @ 04:42), Max: 36.7 (05-05-22 @ 21:02)  HR: 100 (05-06-22 @ 04:42) (62 - 100)  BP: 126/69 (05-06-22 @ 04:42) (122/67 - 148/55)  RR: 18 (05-06-22 @ 04:42) (17 - 18)  SpO2: 96% (05-06-22 @ 04:42) (95% - 99%)  Wt(kg): --        05-05-22 @ 07:01  -  05-06-22 @ 07:00  --------------------------------------------------------  IN: 150 mL / OUT: 0 mL / NET: 150 mL      Physical Exam:  	              Gen: lethargic, confused  	Pulm: Decreased breath sounds b/l bases.  	CV: No JVD. IRR,   	Abd: +BS, soft, nontender, softly distended, obese               Extremity no edema              Extremity LE: + hyperpigmented bilateral LE with no edema              Vascular: R IJ HD catheter, L arm AVF     LABS/STUDIES  --------------------------------------------------------------------------------              10.2   11.85 >-----------<  272      [05-06-22 @ 07:57]              33.5     131  |  91  |  18  ----------------------------<  166      [05-06-22 @ 07:57]  3.9   |  25  |  4.44        Ca     8.8     [05-06-22 @ 07:57]      Mg     2.1     [05-06-22 @ 07:57]      Phos  5.1     [05-05-22 @ 13:04]            Creatinine Trend:  SCr 4.44 [05-06 @ 07:57]  SCr 7.41 [05-06 @ 00:06]  SCr 7.60 [05-05 @ 13:04]  SCr 5.26 [05-04 @ 07:11]  SCr 7.97 [05-03 @ 02:01]                  Iron 16, TIBC 239, %sat 7      [04-15-22 @ 10:16]  Ferritin 172      [04-15-22 @ 09:05]  PTH -- (Ca 8.3)      [04-15-22 @ 12:18]   150  PTH -- (Ca --)      [04-03-22 @ 23:06]   97  HbA1c 11.7      [11-07-19 @ 09:14]  TSH 1.98      [04-05-22 @ 05:19]    DEREJE: titer Negative, pattern Negative      [04-11-22 @ 16:24]  C3 Complement 61      [04-11-22 @ 16:56]  C4 Complement 28      [04-11-22 @ 16:56]  ANCA: cANCA Negative, pANCA Negative, atypical ANCA Indeterminate Method interference due to DEREJE fluorescence      [04-11-22 @ 16:24]  anti-GBM 3      [04-11-22 @ 19:27]  Free Light Chains: kappa 16.92, lambda 12.63, ratio = 1.34      [04-11 @ 16:56]  Immunofixation Serum: No Monoclonal Band Identified    Reference Range: None Detected      [04-11-22 @ 16:56]  SPEP Interpretation: Normal Electrophoresis Pattern      [04-11-22 @ 16:56]

## 2022-05-06 NOTE — PROGRESS NOTE ADULT - ASSESSMENT
67 year old gentleman with PMH of CAD, NSTEMI s/p 3 stents in 2014 (stents to LAD, proximal and distal LCx), ischemic cardiomyopathy, HTN for 10 years, T2DM for 26 years c/b peripheral neuropathy, CVA, TIA, Afib on Pradaxa, chronic RLE wound, L carotid stenosis s/p endarterectomy (2014) just recently admitted  to the [facility] on 2/19/2022 with acute onset chest pain for one day found to have an NSTEMI, CAD, s/p PCI in setting of increased AF rates off bb for 3 days and resolved chest pain, his CKMB peaked and Echo noted with EF 60%,normal LV function, mild TR, mild W[place]. He was s/p [facility] with 85% proximal LAD s/p balloon angioplasty and LULU. He presented to [facility] ED with decreased urine output over the week. [facility] and was started  on Nitrofurantoin. Pt is still taking Nitrofurantoin w/ 5 days left. He came to the ED due to worsening symptoms. Pt reports having a similar incident 4-5 years ago that resolved spontaneously. He reports increased leg edema Dyspnea worse on exertion. his baseline Serum creatinine is in the 2.0 to 2.3 mg/dl range. ANT with serum creatinine of 8.29 with bun 144 and k 5.5        1- ANT on ckd III ---> ESRD likely   2- chf chronic   3- hyperphosphatemia /shpt   4- anemia   5- hyperlipidemia   6- HTN /a fib  7- TIA hx       no HD today  renvela 2 tab with meals   anemia - epogen 94457 Units TIW with HD.  PT  encourage po intake  seroquel 25 mg at bedtime prn  hydralazine 25 mg tid  67 year old gentleman with PMH of CAD, NSTEMI s/p 3 stents in 2014 (stents to LAD, proximal and distal LCx), ischemic cardiomyopathy, HTN for 10 years, T2DM for 26 years c/b peripheral neuropathy, CVA, TIA, Afib on Pradaxa, chronic RLE wound, L carotid stenosis s/p endarterectomy (2014) just recently admitted  to the [facility] on 2/19/2022 with acute onset chest pain for one day found to have an NSTEMI, CAD, s/p PCI in setting of increased AF rates off bb for 3 days and resolved chest pain, his CKMB peaked and Echo noted with EF 60%,normal LV function, mild TR, mild [place]. He was s/p [facility] with 85% proximal LAD s/p balloon angioplasty and LULU. He presented to [facility] ED with decreased urine output over the week. [facility] and was started  on Nitrofurantoin. Pt is still taking Nitrofurantoin w/ 5 days left. He came to the ED due to worsening symptoms. Pt reports having a similar incident 4-5 years ago that resolved spontaneously. He reports increased leg edema Dyspnea worse on exertion. his baseline Serum creatinine is in the 2.0 to 2.3 mg/dl range. ANT with serum creatinine of 8.29 with bun 144 and k 5.5        1- ANT on ckd III ---> ESRD likely   2- chf chronic   3- hyperphosphatemia /shpt   4- anemia   5- hyperlipidemia   6- HTN /a fib  7- TIA hx       no HD today  renvela 2 tab with meals   anemia - epogen 94501 Units TIW with HD.  PT  encourage po intake  seroquel 25 mg at bedtime   hydralazine 25 mg tid

## 2022-05-06 NOTE — PROGRESS NOTE ADULT - ASSESSMENT
Patient is a 67 year old male with PMH of CAD with past multiple PCI, with most recent discharge from [place] in Feb 2022 for NSTEMI with LULU of an 85% prox LAD lesion with patient on ASA and now off Plavix per cardiology (only for one month), chronic afib on Pradaxa, HTN, type 2 DM on insulin, diabetic neuropathy with chronic RIGHT LE venous stasis changes>left, LEFT foot ulcer seen by podiatry, past endarterectomy LEFT CEA in 2014 who presented with UTI with hematuria diagnosed at urgent care and now with decreased urine output.    4/18 for LUE AVF creation, possible graft placement.    Leukocytosis  pt has had fluctuations throughout hospital course  all cultures remain NGTD including 4/30  monitor stool output and consistency  CXR w/o evidence of focal consolidation and pt w/o respiratory symptoms  pt no longer makes urine, denies abd pain or suprapubic tenderness  no tenderness or erythema at IV sites  no frontal, maxillary or ethmoid sinus tenderness on exam  pt w/ poor dentitions  leukocytosis slowly improving, likely reactive  no objection to discharge from ID standpoint    Acute cystitis  S/p ceftriaxone x7 day course completed 4/9    ANT on CKD3  Nephrology following, appreciate recs  s/p placement of shiley and initiation of HD; s/p permcath placement 4/20  monitor Cr   renally dose meds   Avoid nephrotoxic agents    B/l LE edema with chronic venous stasis  Acute on chronic HFpEF  legs cool to touch, nontender, chronic changes with no sign of infection/cellulitis  has wound on bottom of L foot- evaluated by team last week- dry and does not appear infected either     DM2 with neuropathy  Blood glucose management per primary team.    We will sign off. Thank you for allowing us to participate in the care of Mr. Stevens. Please feel free to call with any questions or concerns.     Eugene Meeks M.D.  [facility], Division of Infectious Diseases  [phone]  After 5pm on weekdays and all day on weekends - please call [phone]

## 2022-05-07 NOTE — PROGRESS NOTE ADULT - SUBJECTIVE AND OBJECTIVE BOX
DATE OF SERVICE: 05-07-22 @ 12:54  CHIEF COMPLAINT:Patient is a 67y old  Male who presents with a chief complaint of Decreased urine output for the past week, leg oedema (07 May 2022 10:42)    	        PAST MEDICAL & SURGICAL HISTORY:  HTN (hypertension), benign    HLD (hyperlipidemia)    DM type 2 (diabetes mellitus, type 2)    TIA (transient ischemic attack)    Atrial fibrillation    MI (myocardial infarction)  circa 2014 and 2022.    CAD (coronary artery disease)    Neuropathy    Stage 3 chronic kidney disease    2019 novel coronavirus disease (COVID-19)  12/2021.  Received the COVID-19 vaccine x 2 as of April 2022.    Status post angioplasty with stent  LULU x 3 2/7/2014    S/P carotid endarterectomy  left            REVIEW OF SYSTEMS:  CONSTITUTIONAL: No fever, weight loss, or fatigue  EYES: No eye pain, visual disturbances, or discharge  NECK: No pain or stiffness  RESPIRATORY: No cough, wheezing, chills or hemoptysis; No Shortness of Breath  CARDIOVASCULAR: No chest pain, palpitations, passing out, dizziness, or leg swelling  GASTROINTESTINAL: No abdominal or epigastric pain. No nausea, vomiting, or hematemesis; No diarrhea or constipation. No melena or hematochezia.  GENITOURINARY: No dysuria, frequency, hematuria, or incontinence  NEUROLOGICAL: No headaches, memory loss, loss of strength, numbness, or tremors  SKIN: No itching, burning, rashes, or lesions   LYMPH Nodes: No enlarged glands  ENDOCRINE: No heat or cold intolerance; No hair loss  MUSCULOSKELETAL: No joint pain or swelling; No muscle, back, or extremity pain    Medications:  MEDICATIONS  (STANDING):  allopurinol 100 milliGRAM(s) Oral daily  apixaban 5 milliGRAM(s) Oral two times a day  aspirin enteric coated 81 milliGRAM(s) Oral daily  atorvastatin 40 milliGRAM(s) Oral at bedtime  chlorhexidine 4% Liquid 1 Application(s) Topical <User Schedule>  dextrose 50% Injectable 25 Gram(s) IV Push once  dextrose 50% Injectable 25 Gram(s) IV Push once  dextrose 50% Injectable 12.5 Gram(s) IV Push once  diltiazem    milliGRAM(s) Oral daily  hydrALAZINE 25 milliGRAM(s) Oral three times a day  insulin lispro (ADMELOG) corrective regimen sliding scale   SubCutaneous at bedtime  insulin lispro (ADMELOG) corrective regimen sliding scale   SubCutaneous three times a day before meals  lidocaine   4% Patch 1 Patch Transdermal daily  metoprolol succinate  milliGRAM(s) Oral daily  polyethylene glycol 3350 17 Gram(s) Oral two times a day  povidone iodine 10% Solution 1 Application(s) Topical daily  QUEtiapine 25 milliGRAM(s) Oral at bedtime  senna 2 Tablet(s) Oral at bedtime  sevelamer carbonate 1600 milliGRAM(s) Oral three times a day with meals  sodium chloride 0.9%. 1000 milliLiter(s) (50 mL/Hr) IV Continuous <Continuous>    MEDICATIONS  (PRN):  bisacodyl 5 milliGRAM(s) Oral every 12 hours PRN Constipation  dextrose Oral Gel 15 Gram(s) Oral once PRN Blood Glucose LESS THAN 70 milliGRAM(s)/deciliter  sodium chloride 0.9% lock flush 10 milliLiter(s) IV Push every 1 hour PRN Pre/post blood products, medications, blood draw, and to maintain line patency    	    PHYSICAL EXAM:  T(C): 36.4 (05-07-22 @ 05:06), Max: 37 (05-06-22 @ 22:05)  HR: 100 (05-07-22 @ 05:06) (94 - 100)  BP: 133/80 (05-07-22 @ 05:06) (114/73 - 133/80)  RR: 17 (05-07-22 @ 05:06) (17 - 17)  SpO2: 95% (05-07-22 @ 05:06) (95% - 95%)  Wt(kg): --  I&O's Summary    06 May 2022 07:01  -  07 May 2022 07:00  --------------------------------------------------------  IN: 300 mL / OUT: 0 mL / NET: 300 mL        Appearance: Normal	  HEENT:   Normal oral mucosa, PERRL, EOMI	    Cardiovascular: Normal S1 S2, No JVD,  Respiratory: Lungs clear to auscultation	    Gastrointestinal:  Soft, Non-tender, + BS	  	  Neurologic: Non-focal  Extremities: dec rom   pvd     TELEMETRY: 	    ECG:  	  RADIOLOGY:  OTHER: 	  	  LABS:	 	    CARDIAC MARKERS:                                9.9    11.13 )-----------( 265      ( 07 May 2022 06:42 )             34.5     05-07    131<L>  |  91<L>  |  28<H>  ----------------------------<  195<H>  4.2   |  23  |  6.40<H>    Ca    9.1      07 May 2022 06:42  Phos  5.1     05-05  Mg     2.1     05-06      proBNP:   Lipid Profile:   HgA1c:   TSH:

## 2022-05-07 NOTE — PROGRESS NOTE ADULT - SUBJECTIVE AND OBJECTIVE BOX
CARDIOLOGY FOLLOW UP - Dr. Mazariegos  /Date of Service: 5/7/202    CC: no events    Review of Systems:  Constitutional: No fever, weight loss, or fatigue  Respiratory: No cough, wheezing, or hemoptysis, no shortness of breath  Cardiovascular: No chest pain, palpitations, passing out, dizziness, or leg swelling  Gastrointestinal: No abd or epigastric pain. No nausea, vomiting, or hematemesis; no diarrhea or consiptaiton, no melena or hematochezia  Vascular: No edema     TELEMETRY:    PHYSICAL EXAM:  T(C): 36.4 (05-07-22 @ 05:06), Max: 37 (05-06-22 @ 22:05)  HR: 100 (05-07-22 @ 05:06) (82 - 100)  BP: 133/80 (05-07-22 @ 05:06) (114/73 - 146/70)  RR: 17 (05-07-22 @ 05:06) (17 - 18)  SpO2: 95% (05-07-22 @ 05:06) (95% - 95%)  Wt(kg): --  I&O's Summary    06 May 2022 07:01  -  07 May 2022 07:00  --------------------------------------------------------  IN: 300 mL / OUT: 0 mL / NET: 300 mL        Appearance: Normal	  Cardiovascular: Normal S1 S2,RRR, No JVD, No murmurs  Respiratory: Lungs clear to auscultation	  Gastrointestinal:  Soft, Non-tender, + BS	  Extremities: Normal range of motion, No clubbing, cyanosis or edema  Vascular: Peripheral pulses palpable 2+ bilaterally       Home Medications:  allopurinol 100 mg oral tablet: 1 tab(s) orally once a day (03 Apr 2022 06:34)  aspirin 81 mg oral delayed release tablet: 1 tab(s) orally once a day (03 Apr 2022 06:34)  bumetanide 2 mg oral tablet: 1 tab(s) orally once a day (03 Apr 2022 06:34)  insulin glargine 100 units/mL subcutaneous solution: 25 unit(s) subcutaneous once a day (at bedtime) (03 Apr 2022 06:34)  metOLazone 5 mg oral tablet: 1 tab(s) orally 3 times a week (03 Apr 2022 06:34)  metoprolol succinate 50 mg oral tablet, extended release: 2 tab(s) orally once a day (03 Apr 2022 06:34)  NovoLOG 100 units/mL subcutaneous solution: subcutaneous 4 times a day (before meals and at bedtime) (03 Apr 2022 06:34)  NovoLOG FlexPen 100 units/mL injectable solution: 10 unit(s) subcutaneous 3 times a day (03 Apr 2022 06:34)  oxycodone-acetaminophen 5 mg-325 mg oral tablet: 1 tab(s) orally 2 times a day (03 Apr 2022 06:34)  Pradaxa 150 mg oral capsule: 1 cap(s) orally 2 times a day (03 Apr 2022 06:34)  pregabalin 100 mg oral capsule: 1 cap(s) orally 3 times a day (03 Apr 2022 06:34)        MEDICATIONS  (STANDING):  allopurinol 100 milliGRAM(s) Oral daily  apixaban 5 milliGRAM(s) Oral two times a day  aspirin enteric coated 81 milliGRAM(s) Oral daily  atorvastatin 40 milliGRAM(s) Oral at bedtime  chlorhexidine 4% Liquid 1 Application(s) Topical <User Schedule>  dextrose 50% Injectable 25 Gram(s) IV Push once  dextrose 50% Injectable 25 Gram(s) IV Push once  dextrose 50% Injectable 12.5 Gram(s) IV Push once  diltiazem    milliGRAM(s) Oral daily  hydrALAZINE 25 milliGRAM(s) Oral three times a day  insulin lispro (ADMELOG) corrective regimen sliding scale   SubCutaneous at bedtime  insulin lispro (ADMELOG) corrective regimen sliding scale   SubCutaneous three times a day before meals  lidocaine   4% Patch 1 Patch Transdermal daily  metoprolol succinate  milliGRAM(s) Oral daily  polyethylene glycol 3350 17 Gram(s) Oral two times a day  povidone iodine 10% Solution 1 Application(s) Topical daily  senna 2 Tablet(s) Oral at bedtime  sevelamer carbonate 1600 milliGRAM(s) Oral three times a day with meals  sodium chloride 0.9%. 1000 milliLiter(s) (50 mL/Hr) IV Continuous <Continuous>        EKG:  RADIOLOGY:  DIAGNOSTIC TESTING:  [ ] Echocardiogram:  [ ] Catherterization:  [ ] Stress Test:  OTHER:     LABS:	 	                          9.9    11.13 )-----------( 265      ( 07 May 2022 06:42 )             34.5     05-07    131<L>  |  91<L>  |  28<H>  ----------------------------<  195<H>  4.2   |  23  |  6.40<H>    Ca    9.1      07 May 2022 06:42  Phos  5.1     05-05  Mg     2.1     05-06            CARDIAC MARKERS:

## 2022-05-07 NOTE — PROGRESS NOTE ADULT - ASSESSMENT
67 year old gentleman with PMH of CAD, NSTEMI s/p 3 stents in 2014 (stents to LAD, proximal and distal LCx), ischemic cardiomyopathy, HTN for 10 years, T2DM for 26 years c/b peripheral neuropathy, CVA, TIA, Afib on Pradaxa, chronic RLE wound, L carotid stenosis s/p endarterectomy (2014) just recently admitted  to the [facility] on 2/19/2022 with acute onset chest pain for one day found to have an NSTEMI, CAD, s/p PCI in setting of increased AF rates off bb for 3 days and resolved chest pain, his CKMB peaked and Echo noted with EF 60%,normal LV function, mild TR, mild [place]. He was s/p [facility] with 85% proximal LAD s/p balloon angioplasty and LULU. He presented to [facility] ED with decreased urine output over the week. [facility] and was started  on Nitrofurantoin. Pt is still taking Nitrofurantoin w/ 5 days left. He came to the ED due to worsening symptoms. Pt reports having a similar incident 4-5 years ago that resolved spontaneously. He reports increased leg edema Dyspnea worse on exertion. his baseline Serum creatinine is in the 2.0 to 2.3 mg/dl range. ANT with serum creatinine of 8.29 with bun 144 and k 5.5        1- ANT on ckd III ---> ESRD likely   2- chf chronic   3- hyperphosphatemia /shpt   4- anemia   5- hyperlipidemia   6- HTN /a fib  7- TIA hx       HD today  F200, 225 min, , , 0.5-1   fluid removal ordered  see HD flowsheet  renvela 2 tab with meals   anemia - epogen 43776 Units TIW with HD.  PT  seroquel 25 mg daily

## 2022-05-07 NOTE — PROGRESS NOTE ADULT - ASSESSMENT
·  Problem: Acute kidney injury superimposed on CKD. pt currently on hd  to cont as per renal  avf done    perm cath done     Problem/Plan - 2:  ·  Problem: Chronic atrial fibrillation. c/w a/c  cards f/u     Problem/Plan - 3:  ·  Problem: Cystitis.   ID follow up appreciated        Problem/Plan - 4:  ·  Problem: Type 2 diabetes mellitus. c/w insulin   endo f/u      Problem/Plan - 5:  ·  Problem: CAD (coronary artery disease).   rapid a fib  meds adjusted  hr stable  c/w a/c       cards f/u     constipation improved  bowel regimen      gout pain   better    rehab planning         ·  Problem: Acute kidney injury superimposed on CKD. pt currently on hd  to cont as per renal  avf done    perm cath done     Problem/Plan - 2:  ·  Problem: Chronic atrial fibrillation. c/w a/c  cards f/u     Problem/Plan - 3:  ·  Problem: Cystitis.   ID follow up appreciated        Problem/Plan - 4:  ·  Problem: Type 2 diabetes mellitus. c/w insulin   endo f/u      Problem/Plan - 5:  ·  Problem: CAD (coronary artery disease).   rapid a fib  meds adjusted  hr stable  c/w a/c       cards f/u     constipation improved  bowel regimen      gout pain   better    rehab planning      dr jason clifton  to cover me 5/ 8 to 5/ 10

## 2022-05-07 NOTE — PROGRESS NOTE ADULT - SUBJECTIVE AND OBJECTIVE BOX
[facility]   [phone]  RENAL FOLLOW UP NOTE  --------------------------------------------------------------------------------  Chief Complaint:    24 hour events/subjective:    seen earlier   still intermittently confused as per wife   overall eating less as well per RN     PAST HISTORY  --------------------------------------------------------------------------------  No significant changes to PMH, PSH, FHx, SHx, unless otherwise noted    ALLERGIES & MEDICATIONS  --------------------------------------------------------------------------------  Allergies    nitrofurantoin (Nephrotoxicity)    Intolerances      Standing Inpatient Medications  allopurinol 100 milliGRAM(s) Oral daily  apixaban 5 milliGRAM(s) Oral two times a day  aspirin enteric coated 81 milliGRAM(s) Oral daily  atorvastatin 40 milliGRAM(s) Oral at bedtime  chlorhexidine 4% Liquid 1 Application(s) Topical <User Schedule>  dextrose 50% Injectable 25 Gram(s) IV Push once  dextrose 50% Injectable 25 Gram(s) IV Push once  dextrose 50% Injectable 12.5 Gram(s) IV Push once  diltiazem    milliGRAM(s) Oral daily  hydrALAZINE 25 milliGRAM(s) Oral three times a day  insulin lispro (ADMELOG) corrective regimen sliding scale   SubCutaneous at bedtime  insulin lispro (ADMELOG) corrective regimen sliding scale   SubCutaneous three times a day before meals  lidocaine   4% Patch 1 Patch Transdermal daily  metoprolol succinate  milliGRAM(s) Oral daily  polyethylene glycol 3350 17 Gram(s) Oral two times a day  povidone iodine 10% Solution 1 Application(s) Topical daily  QUEtiapine 25 milliGRAM(s) Oral at bedtime  senna 2 Tablet(s) Oral at bedtime  sevelamer carbonate 1600 milliGRAM(s) Oral three times a day with meals  sodium chloride 0.9%. 1000 milliLiter(s) IV Continuous <Continuous>    PRN Inpatient Medications  bisacodyl 5 milliGRAM(s) Oral every 12 hours PRN  dextrose Oral Gel 15 Gram(s) Oral once PRN  sodium chloride 0.9% lock flush 10 milliLiter(s) IV Push every 1 hour PRN      REVIEW OF SYSTEMS  --------------------------------------------------------------------------------      VITALS/PHYSICAL EXAM  --------------------------------------------------------------------------------  T(C): 36.4 (05-07-22 @ 17:46), Max: 37 (05-06-22 @ 22:05)  HR: 67 (05-07-22 @ 17:46) (67 - 100)  BP: 121/62 (05-07-22 @ 17:46) (114/62 - 133/80)  RR: 18 (05-07-22 @ 17:46) (16 - 18)  SpO2: 97% (05-07-22 @ 17:46) (95% - 98%)  Wt(kg): --        05-06-22 @ 07:01  -  05-07-22 @ 07:00  --------------------------------------------------------  IN: 300 mL / OUT: 0 mL / NET: 300 mL    05-07-22 @ 07:01  -  05-07-22 @ 19:51  --------------------------------------------------------  IN: 240 mL / OUT: 0 mL / NET: 240 mL      Physical Exam:  	              Gen: oriented 1-2   	Pulm: Decreased breath sounds b/l bases.  	CV: No JVD. IRR,   	Abd: +BS, soft, nontender, softly distended, obese               Extremity no edema              Extremity LE: + hyperpigmented bilateral LE with no edema              Vascular: R IJ HD catheter, L arm AVF       LABS/STUDIES  --------------------------------------------------------------------------------              9.9    11.13 >-----------<  265      [05-07-22 @ 06:42]              34.5     131  |  91  |  28  ----------------------------<  195      [05-07-22 @ 06:42]  4.2   |  23  |  6.40        Ca     9.1     [05-07-22 @ 06:42]      Mg     2.1     [05-06-22 @ 07:57]            Creatinine Trend:  SCr 6.40 [05-07 @ 06:42]  SCr 4.44 [05-06 @ 07:57]  SCr 7.41 [05-06 @ 00:06]  SCr 7.60 [05-05 @ 13:04]  SCr 5.26 [05-04 @ 07:11]                  Iron 16, TIBC 239, %sat 7      [04-15-22 @ 10:16]  Ferritin 172      [04-15-22 @ 09:05]  PTH -- (Ca 8.3)      [04-15-22 @ 12:18]   150  PTH -- (Ca --)      [04-03-22 @ 23:06]   97  HbA1c 11.7      [11-07-19 @ 09:14]  TSH 1.98      [04-05-22 @ 05:19]    DEREJE: titer Negative, pattern Negative      [04-11-22 @ 16:24]  C3 Complement 61      [04-11-22 @ 16:56]  C4 Complement 28      [04-11-22 @ 16:56]  ANCA: cANCA Negative, pANCA Negative, atypical ANCA Indeterminate Method interference due to DEREJE fluorescence      [04-11-22 @ 16:24]  anti-GBM 3      [04-11-22 @ 19:27]  Free Light Chains: kappa 16.92, lambda 12.63, ratio = 1.34      [04-11 @ 16:56]  Immunofixation Serum: No Monoclonal Band Identified    Reference Range: None Detected      [04-11-22 @ 16:56]  SPEP Interpretation: Normal Electrophoresis Pattern      [04-11-22 @ 16:56]

## 2022-05-07 NOTE — PROGRESS NOTE ADULT - ASSESSMENT
A/P    66 y/o M with history of CAD, s/p multiple PCI, with most recent 2/22 PCI of LAD in setting of NSTEMI, on ASA only (pradaxa), chronic atrial fibrillation maintained on Pradaxa, essential HTN, type 2 DM previously on oral medications but on insulin, diabetic neuropathy with chronic RIGHT LE venous stasis changes>left, LEFT foot ulcer seen by podiatry, past endarterectomy LEFT CEA in 2014 presenting with 1 week of decreased urine output, cystitis with hematuria     #ANT on CKD, new HD  -oliguric renal failure in setting of UTI/cystitis  -etiology ??  -New HD for uremia, renal failure  -sp rrt 4/12 for uncontrolled bleeding sp  right groin shiley removal  - monitor h/h - stable  -s/p L AVG 4/18  -s/p permacath placement 4/20  -renal f/ u    #AMS/Lethargy  -recent ams from pain meds  -AMS sp RRT 4/27  likely secondary to pain med   -repeat CT 4/27 unremarkable --  discontinued Percocet- improved   -med f/u     #Acute on chronic HFpEF  -stable  -continue volume removal with HD  -c/w  bb    #Chronic AF  -rates stable   -c/w metoprolol succ 100 mg qd  -c/w dilt cd 120mg daily    -c/w eliquis 5 mg BID for now - heme stable     #HTN  -stable  -c/w meds       #CAD, s/p PCI, most recent 2/2022  -stable, cont asa  -off plavix due to a/c indication  -statin     #R carotid stenosis  -cont med tx  -MRA noted with Greater than 75% stenosis of the right distal common carotid artery. Approximately 60% stenosis of the proximal left internal carotid artery.  -Continue ASA and Statin Therapy  -vasc outpt f/u Dr Pinto    dc to rehab

## 2022-05-08 NOTE — PROGRESS NOTE ADULT - ASSESSMENT
·  Problem:[email or web address] hemodialysis per renal  to cont as per renal     Problem/Plan - 2:  ·  Problem: Chronic atrial fibrillation. c/w a/c  cards f/u     Problem/Plan - 3:  ·  Problem: Cystitis.   ID follow up appreciated        Problem/Plan - 4:  ·  Problem: Type 2 diabetes mellitus. c/w insulin   endo f/u      Problem/Plan - 5:  ·  Problem: CAD (coronary artery disease).   rapid a fib  meds adjusted  hr stable  c/w a/c       cards f/u     constipation improved  bowel regimen      gout pain   better    rehab planning when bed available   discussed with patient's family at bedside in detail

## 2022-05-08 NOTE — PROGRESS NOTE ADULT - SUBJECTIVE AND OBJECTIVE BOX
Patient is a 67y old  Male who presents with a chief complaint of Decreased urine output for the past week, leg oedema (08 May 2022 12:21)      DATE OF SERVICE: 05-08-22 @ 12:31    SUBJECTIVE / OVERNIGHT EVENTS: overnight events noted  wife at bedside     ROS:  Resp: No cough no sputum production  CVS: No chest pain no palpitations no orthopnea  GI: no N/V/D  : no dysuria, no hematuria        MEDICATIONS  (STANDING):  allopurinol 100 milliGRAM(s) Oral daily  apixaban 5 milliGRAM(s) Oral two times a day  aspirin enteric coated 81 milliGRAM(s) Oral daily  atorvastatin 40 milliGRAM(s) Oral at bedtime  chlorhexidine 4% Liquid 1 Application(s) Topical <User Schedule>  dextrose 50% Injectable 25 Gram(s) IV Push once  dextrose 50% Injectable 25 Gram(s) IV Push once  dextrose 50% Injectable 12.5 Gram(s) IV Push once  diltiazem    milliGRAM(s) Oral daily  hydrALAZINE 25 milliGRAM(s) Oral three times a day  insulin lispro (ADMELOG) corrective regimen sliding scale   SubCutaneous at bedtime  insulin lispro (ADMELOG) corrective regimen sliding scale   SubCutaneous three times a day before meals  lidocaine   4% Patch 1 Patch Transdermal daily  metoprolol succinate  milliGRAM(s) Oral daily  polyethylene glycol 3350 17 Gram(s) Oral two times a day  povidone iodine 10% Solution 1 Application(s) Topical daily  QUEtiapine 25 milliGRAM(s) Oral at bedtime  senna 2 Tablet(s) Oral at bedtime  sevelamer carbonate 1600 milliGRAM(s) Oral three times a day with meals  sodium chloride 0.9%. 1000 milliLiter(s) (50 mL/Hr) IV Continuous <Continuous>    MEDICATIONS  (PRN):  bisacodyl 5 milliGRAM(s) Oral every 12 hours PRN Constipation  dextrose Oral Gel 15 Gram(s) Oral once PRN Blood Glucose LESS THAN 70 milliGRAM(s)/deciliter  sodium chloride 0.9% lock flush 10 milliLiter(s) IV Push every 1 hour PRN Pre/post blood products, medications, blood draw, and to maintain line patency        CAPILLARY BLOOD GLUCOSE      POCT Blood Glucose.: 170 mg/dL (08 May 2022 08:35)  POCT Blood Glucose.: 131 mg/dL (07 May 2022 21:32)  POCT Blood Glucose.: 221 mg/dL (07 May 2022 17:01)    I&O's Summary    07 May 2022 07:01  -  08 May 2022 07:00  --------------------------------------------------------  IN: 590 mL / OUT: 500 mL / NET: 90 mL        Vital Signs Last 24 Hrs  T(C): 36.8 (08 May 2022 12:08), Max: 36.9 (07 May 2022 21:19)  T(F): 98.2 (08 May 2022 12:08), Max: 98.5 (07 May 2022 21:19)  HR: 81 (08 May 2022 12:08) (67 - 107)  BP: 146/61 (08 May 2022 12:08) (100/60 - 168/68)  BP(mean): --  RR: 19 (08 May 2022 12:08) (16 - 20)  SpO2: 98% (08 May 2022 12:08) (95% - 99%)    PE  HEENT:  , PERRL, EOMI	  Cardiovascular: Normal S1 S2,  Respiratory: Lungs clear to auscultation	  Gastrointestinal:  Soft, Non-tender, + BS	  Neurologic: Non-focal alert and converational  Extremities: left toe bandaged      LABS:                        9.9    11.13 )-----------( 265      ( 07 May 2022 06:42 )             34.5     05-08    135  |  96  |  21  ----------------------------<  252<H>  4.1   |  25  |  5.39<H>    Ca    9.0      08 May 2022 10:30  Phos  3.7     05-08  Mg     2.4     05-08                  All consultant(s) notes reviewed and care discussed with other providers        Contact Number, Dr King [phone]

## 2022-05-08 NOTE — PROGRESS NOTE ADULT - SUBJECTIVE AND OBJECTIVE BOX
[facility]   [phone]  RENAL FOLLOW UP NOTE  --------------------------------------------------------------------------------  Chief Complaint:    24 hour events/subjective:    seen earlier   confused family at bedside     PAST HISTORY  --------------------------------------------------------------------------------  No significant changes to PMH, PSH, FHx, SHx, unless otherwise noted    ALLERGIES & MEDICATIONS  --------------------------------------------------------------------------------  Allergies    nitrofurantoin (Nephrotoxicity)    Intolerances      Standing Inpatient Medications  allopurinol 100 milliGRAM(s) Oral daily  apixaban 5 milliGRAM(s) Oral two times a day  aspirin enteric coated 81 milliGRAM(s) Oral daily  atorvastatin 40 milliGRAM(s) Oral at bedtime  chlorhexidine 4% Liquid 1 Application(s) Topical <User Schedule>  dextrose 50% Injectable 25 Gram(s) IV Push once  dextrose 50% Injectable 25 Gram(s) IV Push once  dextrose 50% Injectable 12.5 Gram(s) IV Push once  diltiazem    milliGRAM(s) Oral daily  hydrALAZINE 25 milliGRAM(s) Oral three times a day  insulin lispro (ADMELOG) corrective regimen sliding scale   SubCutaneous at bedtime  insulin lispro (ADMELOG) corrective regimen sliding scale   SubCutaneous three times a day before meals  lidocaine   4% Patch 1 Patch Transdermal daily  metoprolol succinate  milliGRAM(s) Oral daily  polyethylene glycol 3350 17 Gram(s) Oral two times a day  povidone iodine 10% Solution 1 Application(s) Topical daily  QUEtiapine 25 milliGRAM(s) Oral at bedtime  senna 2 Tablet(s) Oral at bedtime  sevelamer carbonate 1600 milliGRAM(s) Oral three times a day with meals  sodium chloride 0.9%. 1000 milliLiter(s) IV Continuous <Continuous>    PRN Inpatient Medications  bisacodyl 5 milliGRAM(s) Oral every 12 hours PRN  dextrose Oral Gel 15 Gram(s) Oral once PRN  sodium chloride 0.9% lock flush 10 milliLiter(s) IV Push every 1 hour PRN      REVIEW OF SYSTEMS  --------------------------------------------------------------------------------    not giving ros     VITALS/PHYSICAL EXAM  --------------------------------------------------------------------------------  T(C): 36.8 (05-08-22 @ 12:08), Max: 36.9 (05-07-22 @ 21:19)  HR: 90 (05-08-22 @ 16:38) (68 - 107)  BP: 135/65 (05-08-22 @ 16:38) (100/60 - 168/68)  RR: 19 (05-08-22 @ 12:08) (19 - 20)  SpO2: 98% (05-08-22 @ 12:08) (95% - 99%)  Wt(kg): --        05-07-22 @ 07:01  -  05-08-22 @ 07:00  --------------------------------------------------------  IN: 590 mL / OUT: 500 mL / NET: 90 mL      Physical Exam:  	              Gen: confused   	Pulm: Decreased breath sounds b/l bases.  	CV: No JVD. IRR,   	Abd: +BS, soft, nontender, softly distended, obese               Extremity no edema              Extremity LE: + hyperpigmented bilateral LE with no edema              Vascular: R IJ HD catheter, L arm AVF       LABS/STUDIES  --------------------------------------------------------------------------------              9.9    11.13 >-----------<  265      [05-07-22 @ 06:42]              34.5     135  |  96  |  21  ----------------------------<  252      [05-08-22 @ 10:30]  4.1   |  25  |  5.39        Ca     9.0     [05-08-22 @ 10:30]      Mg     2.4     [05-08-22 @ 10:30]      Phos  3.7     [05-08-22 @ 10:30]            Creatinine Trend:  SCr 5.39 [05-08 @ 10:30]  SCr 6.40 [05-07 @ 06:42]  SCr 4.44 [05-06 @ 07:57]  SCr 7.41 [05-06 @ 00:06]  SCr 7.60 [05-05 @ 13:04]                  Iron 16, TIBC 239, %sat 7      [04-15-22 @ 10:16]  Ferritin 172      [04-15-22 @ 09:05]  PTH -- (Ca 8.3)      [04-15-22 @ 12:18]   150  PTH -- (Ca --)      [04-03-22 @ 23:06]   97  HbA1c 11.7      [11-07-19 @ 09:14]  TSH 1.98      [04-05-22 @ 05:19]    DEREJE: titer Negative, pattern Negative      [04-11-22 @ 16:24]  C3 Complement 61      [04-11-22 @ 16:56]  C4 Complement 28      [04-11-22 @ 16:56]  ANCA: cANCA Negative, pANCA Negative, atypical ANCA Indeterminate Method interference due to DEREJE fluorescence      [04-11-22 @ 16:24]  anti-GBM 3      [04-11-22 @ 19:27]  Free Light Chains: kappa 16.92, lambda 12.63, ratio = 1.34      [04-11 @ 16:56]  Immunofixation Serum: No Monoclonal Band Identified    Reference Range: None Detected      [04-11-22 @ 16:56]  SPEP Interpretation: Normal Electrophoresis Pattern      [04-11-22 @ 16:56]

## 2022-05-08 NOTE — PROGRESS NOTE ADULT - SUBJECTIVE AND OBJECTIVE BOX
CARDIOLOGY FOLLOW UP - Dr. Mazariegos  Date of Service: 5/8/22  CC: no events    Review of Systems:  Constitutional: No fever, weight loss, or fatigue  Respiratory: No cough, wheezing, or hemoptysis, no shortness of breath  Cardiovascular: No chest pain, palpitations, passing out, dizziness, or leg swelling  Gastrointestinal: No abd or epigastric pain. No nausea, vomiting, or hematemesis; no diarrhea or consiptaiton, no melena or hematochezia  Vascular: No edema     TELEMETRY:    PHYSICAL EXAM:  T(C): 36.8 (05-08-22 @ 12:08), Max: 36.9 (05-07-22 @ 21:19)  HR: 81 (05-08-22 @ 12:08) (67 - 107)  BP: 146/61 (05-08-22 @ 12:08) (100/60 - 168/68)  RR: 19 (05-08-22 @ 12:08) (16 - 20)  SpO2: 98% (05-08-22 @ 12:08) (95% - 99%)  Wt(kg): --  I&O's Summary    07 May 2022 07:01  -  08 May 2022 07:00  --------------------------------------------------------  IN: 590 mL / OUT: 500 mL / NET: 90 mL        Appearance: Normal	  Cardiovascular: Normal S1 S2,RRR, No JVD, No murmurs  Respiratory: Lungs clear to auscultation	  Gastrointestinal:  Soft, Non-tender, + BS	  Extremities: Normal range of motion, No clubbing, cyanosis or edema  Vascular: Peripheral pulses palpable 2+ bilaterally       Home Medications:  allopurinol 100 mg oral tablet: 1 tab(s) orally once a day (03 Apr 2022 06:34)  aspirin 81 mg oral delayed release tablet: 1 tab(s) orally once a day (03 Apr 2022 06:34)  bumetanide 2 mg oral tablet: 1 tab(s) orally once a day (03 Apr 2022 06:34)  insulin glargine 100 units/mL subcutaneous solution: 25 unit(s) subcutaneous once a day (at bedtime) (03 Apr 2022 06:34)  metOLazone 5 mg oral tablet: 1 tab(s) orally 3 times a week (03 Apr 2022 06:34)  metoprolol succinate 50 mg oral tablet, extended release: 2 tab(s) orally once a day (03 Apr 2022 06:34)  NovoLOG 100 units/mL subcutaneous solution: subcutaneous 4 times a day (before meals and at bedtime) (03 Apr 2022 06:34)  NovoLOG FlexPen 100 units/mL injectable solution: 10 unit(s) subcutaneous 3 times a day (03 Apr 2022 06:34)  oxycodone-acetaminophen 5 mg-325 mg oral tablet: 1 tab(s) orally 2 times a day (03 Apr 2022 06:34)  Pradaxa 150 mg oral capsule: 1 cap(s) orally 2 times a day (03 Apr 2022 06:34)  pregabalin 100 mg oral capsule: 1 cap(s) orally 3 times a day (03 Apr 2022 06:34)        MEDICATIONS  (STANDING):  allopurinol 100 milliGRAM(s) Oral daily  apixaban 5 milliGRAM(s) Oral two times a day  aspirin enteric coated 81 milliGRAM(s) Oral daily  atorvastatin 40 milliGRAM(s) Oral at bedtime  chlorhexidine 4% Liquid 1 Application(s) Topical <User Schedule>  dextrose 50% Injectable 25 Gram(s) IV Push once  dextrose 50% Injectable 12.5 Gram(s) IV Push once  dextrose 50% Injectable 25 Gram(s) IV Push once  diltiazem    milliGRAM(s) Oral daily  hydrALAZINE 25 milliGRAM(s) Oral three times a day  insulin lispro (ADMELOG) corrective regimen sliding scale   SubCutaneous at bedtime  insulin lispro (ADMELOG) corrective regimen sliding scale   SubCutaneous three times a day before meals  lidocaine   4% Patch 1 Patch Transdermal daily  metoprolol succinate  milliGRAM(s) Oral daily  polyethylene glycol 3350 17 Gram(s) Oral two times a day  povidone iodine 10% Solution 1 Application(s) Topical daily  QUEtiapine 25 milliGRAM(s) Oral at bedtime  senna 2 Tablet(s) Oral at bedtime  sevelamer carbonate 1600 milliGRAM(s) Oral three times a day with meals  sodium chloride 0.9%. 1000 milliLiter(s) (50 mL/Hr) IV Continuous <Continuous>        EKG:  RADIOLOGY:  DIAGNOSTIC TESTING:  [ ] Echocardiogram:  [ ] Catherterization:  [ ] Stress Test:  OTHER:     LABS:	 	                          9.9    11.13 )-----------( 265      ( 07 May 2022 06:42 )             34.5     05-08    135  |  96  |  21  ----------------------------<  252<H>  4.1   |  25  |  5.39<H>    Ca    9.0      08 May 2022 10:30  Phos  3.7     05-08  Mg     2.4     05-08            CARDIAC MARKERS:

## 2022-05-08 NOTE — PROGRESS NOTE ADULT - ASSESSMENT
67 year old gentleman with PMH of CAD, NSTEMI s/p 3 stents in 2014 (stents to LAD, proximal and distal LCx), ischemic cardiomyopathy, HTN for 10 years, T2DM for 26 years c/b peripheral neuropathy, CVA, TIA, Afib on Pradaxa, chronic RLE wound, L carotid stenosis s/p endarterectomy (2014) just recently admitted  to the [facility] on 2/19/2022 with acute onset chest pain for one day found to have an NSTEMI, CAD, s/p PCI in setting of increased AF rates off bb for 3 days and resolved chest pain, his CKMB peaked and Echo noted with EF 60%,normal LV function, mild TR, mild CT. He was s/p [facility] with 85% proximal LAD s/p balloon angioplasty and LULU. He presented to [facility] ED with decreased urine output over the week. [facility]. Stevens went to [facility] for hematuria and was started  on Nitrofurantoin. Pt is still taking Nitrofurantoin w/ 5 days left. He came to the ED due to worsening symptoms. Pt reports having a similar incident 4-5 years ago that resolved spontaneously. He reports increased leg edema Dyspnea worse on exertion. his baseline Serum creatinine is in the 2.0 to 2.3 mg/dl range. ANT with serum creatinine of 8.29 with bun 144 and k 5.5        1- ANT on ckd III ---> ESRD likely   2- chf chronic   3- hyperphosphatemia /shpt   4- anemia   5- hyperlipidemia   6- HTN /a fib  7- TIA hx     no hd today   renvela 2 tab with meals   anemia - epogen 53144 Units TIW with HD.  PT  seroquel 25 mg daily   pt has carotid artery stenosis and now is on HD. to have cta carotids and to have vascular evaluate

## 2022-05-09 NOTE — PROGRESS NOTE ADULT - NS ATTEND AMEND GEN_ALL_CORE FT
neuro and/or vascular to see pt. certainly ams not due to ICA stenosis but does it need sx ?   SEA am

## 2022-05-09 NOTE — PROGRESS NOTE ADULT - SUBJECTIVE AND OBJECTIVE BOX
Follow-up Pulm Progress Note    No new respiratory events overnight.  Denies SOB/CP.     Medications:  MEDICATIONS  (STANDING):  allopurinol 100 milliGRAM(s) Oral daily  apixaban 5 milliGRAM(s) Oral two times a day  aspirin enteric coated 81 milliGRAM(s) Oral daily  atorvastatin 40 milliGRAM(s) Oral at bedtime  chlorhexidine 4% Liquid 1 Application(s) Topical <User Schedule>  dextrose 50% Injectable 25 Gram(s) IV Push once  dextrose 50% Injectable 25 Gram(s) IV Push once  dextrose 50% Injectable 12.5 Gram(s) IV Push once  diltiazem    milliGRAM(s) Oral daily  hydrALAZINE 25 milliGRAM(s) Oral three times a day  insulin lispro (ADMELOG) corrective regimen sliding scale   SubCutaneous at bedtime  insulin lispro (ADMELOG) corrective regimen sliding scale   SubCutaneous three times a day before meals  lidocaine   4% Patch 1 Patch Transdermal daily  metoprolol succinate  milliGRAM(s) Oral daily  polyethylene glycol 3350 17 Gram(s) Oral two times a day  povidone iodine 10% Solution 1 Application(s) Topical daily  QUEtiapine 25 milliGRAM(s) Oral at bedtime  senna 2 Tablet(s) Oral at bedtime  sevelamer carbonate 1600 milliGRAM(s) Oral three times a day with meals  sodium chloride 0.9%. 1000 milliLiter(s) (50 mL/Hr) IV Continuous <Continuous>    MEDICATIONS  (PRN):  bisacodyl 5 milliGRAM(s) Oral every 12 hours PRN Constipation  dextrose Oral Gel 15 Gram(s) Oral once PRN Blood Glucose LESS THAN 70 milliGRAM(s)/deciliter  sodium chloride 0.9% lock flush 10 milliLiter(s) IV Push every 1 hour PRN Pre/post blood products, medications, blood draw, and to maintain line patency          Vital Signs Last 24 Hrs  T(C): 36.6 (09 May 2022 04:24), Max: 36.8 (08 May 2022 12:08)  T(F): 97.9 (09 May 2022 04:24), Max: 98.2 (08 May 2022 12:08)  HR: 98 (09 May 2022 04:24) (81 - 98)  BP: 134/66 (09 May 2022 04:24) (134/66 - 146/61)  BP(mean): --  RR: 18 (09 May 2022 04:24) (18 - 19)  SpO2: 97% (09 May 2022 04:24) (96% - 98%)                LABS:    05-08    135  |  96  |  21  ----------------------------<  252<H>  4.1   |  25  |  5.39<H>    Ca    9.0      08 May 2022 10:30  Phos  3.7     05-08  Mg     2.4     05-08            CAPILLARY BLOOD GLUCOSE      POCT Blood Glucose.: 194 mg/dL (09 May 2022 08:23)                        CULTURES:     Culture - Blood (collected 04-30-22 @ 16:21)  Source: .Blood Blood-Peripheral  Final Report (05-05-22 @ 17:01):    No Growth Final    Culture - Blood (collected 04-30-22 @ 16:21)  Source: .Blood Blood-Peripheral  Final Report (05-05-22 @ 17:01):    No Growth Final              Physical Examination:  PULM: Clear to auscultation bilaterally, no significant sputum production  CVS: S1, S2 heard    RADIOLOGY REVIEWED  CXR 4/29: small L pl effusion/atelectasis     CT chest: < from: CT Chest No Cont (04.04.22 @ 16:52) >  FINDINGS:    AIRWAYS, LUNGS, PLEURA: Tracheal secretions. Small left greater than   right pleural effusions increased compared to 2/19/2022. Right-sided   pleural thickening. Lingular and left greater than right basilar   opacification, likely atelectasis.    MEDIASTINUM: Cardiomegaly. No pericardial effusion. Thoracic aorta normal   caliber.  No large mediastinal lymph nodes.    IMAGED ABDOMEN: Nonspecific perinephric stranding.    SOFT TISSUES: Unremarkable.    BONES: Unremarkable.    IMPRESSION:.    Small left greater than right bilateral pleural effusions increased in   size compared to 2/19/2022.    Lingular and left greater than right basilar opacification, likely   atelectasis.    --- End of Report ---    < end of copied text >

## 2022-05-09 NOTE — PROGRESS NOTE ADULT - ASSESSMENT
·  Problem:[email or web address].co hemodialysis per renal  to cont as per renal     Problem/Plan - 2:  ·  Problem: Chronic atrial fibrillation. c/w a/c  cards f/u     Problem/Plan - 3:  ·  Problem: Cystitis.   ID follow up appreciated        Problem/Plan - 4:  ·  Problem: Type 2 diabetes mellitus. c/w insulin   endo f/u      Problem/Plan - 5:  ·  Problem: CAD (coronary artery disease).   rapid a fib  meds adjusted  hr stable  c/w a/c       cards f/u     constipation improved  bowel regimen      gout pain   better    rehab planning when bed available   discussed with patient's family at bedside in detail   neuro follow up requested

## 2022-05-09 NOTE — PROGRESS NOTE ADULT - SUBJECTIVE AND OBJECTIVE BOX
Patient is a 67y old  Male who presents with a chief complaint of Decreased urine output for the past week, leg oedema (09 May 2022 11:43)      DATE OF SERVICE: 05-09-22 @ 13:03    SUBJECTIVE / OVERNIGHT EVENTS: overnight events noted    ROS:  Resp: No cough no sputum production  CVS: No chest pain no palpitations no orthopnea  GI: no N/V/D  per RN      MEDICATIONS  (STANDING):  allopurinol 100 milliGRAM(s) Oral daily  apixaban 5 milliGRAM(s) Oral two times a day  aspirin enteric coated 81 milliGRAM(s) Oral daily  atorvastatin 40 milliGRAM(s) Oral at bedtime  chlorhexidine 4% Liquid 1 Application(s) Topical <User Schedule>  dextrose 50% Injectable 25 Gram(s) IV Push once  dextrose 50% Injectable 25 Gram(s) IV Push once  dextrose 50% Injectable 12.5 Gram(s) IV Push once  diltiazem    milliGRAM(s) Oral daily  hydrALAZINE 25 milliGRAM(s) Oral three times a day  insulin lispro (ADMELOG) corrective regimen sliding scale   SubCutaneous at bedtime  insulin lispro (ADMELOG) corrective regimen sliding scale   SubCutaneous three times a day before meals  lidocaine   4% Patch 1 Patch Transdermal daily  metoprolol succinate  milliGRAM(s) Oral daily  polyethylene glycol 3350 17 Gram(s) Oral two times a day  povidone iodine 10% Solution 1 Application(s) Topical daily  QUEtiapine 25 milliGRAM(s) Oral at bedtime  senna 2 Tablet(s) Oral at bedtime  sevelamer carbonate 1600 milliGRAM(s) Oral three times a day with meals  sodium chloride 0.9%. 1000 milliLiter(s) (50 mL/Hr) IV Continuous <Continuous>    MEDICATIONS  (PRN):  bisacodyl 5 milliGRAM(s) Oral every 12 hours PRN Constipation  dextrose Oral Gel 15 Gram(s) Oral once PRN Blood Glucose LESS THAN 70 milliGRAM(s)/deciliter  sodium chloride 0.9% lock flush 10 milliLiter(s) IV Push every 1 hour PRN Pre/post blood products, medications, blood draw, and to maintain line patency        CAPILLARY BLOOD GLUCOSE      POCT Blood Glucose.: 225 mg/dL (09 May 2022 12:47)  POCT Blood Glucose.: 194 mg/dL (09 May 2022 08:23)  POCT Blood Glucose.: 199 mg/dL (08 May 2022 22:05)  POCT Blood Glucose.: 231 mg/dL (08 May 2022 17:13)    I&O's Summary    09 May 2022 07:01  -  09 May 2022 13:03  --------------------------------------------------------  IN: 150 mL / OUT: 0 mL / NET: 150 mL        Vital Signs Last 24 Hrs  T(C): 36 (09 May 2022 11:10), Max: 36.7 (08 May 2022 20:41)  T(F): 96.8 (09 May 2022 11:10), Max: 98.1 (08 May 2022 20:41)  HR: 99 (09 May 2022 11:10) (83 - 99)  BP: 159/63 (09 May 2022 11:10) (134/66 - 159/63)  BP(mean): --  RR: 17 (09 May 2022 11:10) (17 - 19)  SpO2: 97% (09 May 2022 11:10) (96% - 97%)    PE  HEENT:  , PERRL, EOMI	  Cardiovascular: Normal S1 S2,  Respiratory: Lungs clear to auscultation	  Gastrointestinal:  Soft, Non-tender, + BS	  Neurologic: Non-focal alert and converational  Extremities: left toe bandaged    LABS:    05-08    135  |  96  |  21  ----------------------------<  252<H>  4.1   |  25  |  5.39<H>    Ca    9.0      08 May 2022 10:30  Phos  3.7     05-08  Mg     2.4     05-08                  All consultant(s) notes reviewed and care discussed with other providers        Contact Number, Dr King [phone]

## 2022-05-09 NOTE — PROGRESS NOTE ADULT - ASSESSMENT
A/P    68 y/o M with history of CAD, s/p multiple PCI, with most recent 2/22 PCI of LAD in setting of NSTEMI, on ASA only (pradaxa), chronic atrial fibrillation maintained on Pradaxa, essential HTN, type 2 DM previously on oral medications but on insulin, diabetic neuropathy with chronic RIGHT LE venous stasis changes>left, LEFT foot ulcer seen by podiatry, past endarterectomy LEFT CEA in 2014 presenting with 1 week of decreased urine output, cystitis with hematuria     #ANT on CKD, new HD  -oliguric renal failure in setting of UTI/cystitis  -etiology ??  -New HD for uremia, renal failure  -sp rrt 4/12 for uncontrolled bleeding sp  right groin shiley removal  - monitor h/h - stable  -s/p L AVG 4/18  -s/p permacath placement 4/20  -renal f/ u    #AMS/Lethargy  -recent ams from pain meds  -AMS sp RRT 4/27  likely secondary to pain med   -repeat CT 4/27 unremarkable --  discontinued Percocet- improved   -med f/u     #Acute on chronic HFpEF  -stable  -continue volume removal with HD  -c/w  bb    #Chronic AF  -rates stable   -brief pause noted yesterday   -c/w metoprolol succ 100 mg qd  -c/w dilt cd 120mg daily    -c/w eliquis 5 mg BID for now - heme stable     #HTN  -stable  -c/w meds       #CAD, s/p PCI, most recent 2/2022  -stable, cont asa  -off plavix due to a/c indication  -statin     #R carotid stenosis  -cont med tx  -MRA noted with Greater than 75% stenosis of the right distal common carotid artery. Approximately 60% stenosis of the proximal left internal carotid artery.  -Continue ASA and Statin Therapy  -per renal-  to have cta carotids and to have vascular evaluate     dc to rehab

## 2022-05-09 NOTE — PROGRESS NOTE ADULT - SUBJECTIVE AND OBJECTIVE BOX
CARDIOLOGY FOLLOW UP - Dr. Mazariegos  DATE OF SERVICE: 5/9/22     CC no cp or sob       REVIEW OF SYSTEMS:  CONSTITUTIONAL: No fever, weight loss, or fatigue  RESPIRATORY: No cough, wheezing, chills or hemoptysis; No Shortness of Breath  CARDIOVASCULAR: No chest pain, palpitations, passing out, dizziness, or leg swelling  GASTROINTESTINAL: No abdominal or epigastric pain. No nausea, vomiting, or hematemesis; No diarrhea or constipation. No melena or hematochezia.      PHYSICAL EXAM:  T(C): 36.6 (05-09-22 @ 04:24), Max: 36.8 (05-08-22 @ 12:08)  HR: 98 (05-09-22 @ 04:24) (81 - 98)  BP: 134/66 (05-09-22 @ 04:24) (134/66 - 146/61)  RR: 18 (05-09-22 @ 04:24) (18 - 19)  SpO2: 97% (05-09-22 @ 04:24) (96% - 98%)  Wt(kg): --  I&O's Summary    09 May 2022 07:01  -  09 May 2022 11:25  --------------------------------------------------------  IN: 150 mL / OUT: 0 mL / NET: 150 mL        Appearance: Normal	  Cardiovascular: Normal S1 S2, irreg  Respiratory: Lungs clear to auscultation	  Gastrointestinal:  Soft, Non-tender, + BS	  Extremities: Normal range of motion, No clubbing, cyanosis or edema      Home Medications:  allopurinol 100 mg oral tablet: 1 tab(s) orally once a day (03 Apr 2022 06:34)  aspirin 81 mg oral delayed release tablet: 1 tab(s) orally once a day (03 Apr 2022 06:34)  bumetanide 2 mg oral tablet: 1 tab(s) orally once a day (03 Apr 2022 06:34)  insulin glargine 100 units/mL subcutaneous solution: 25 unit(s) subcutaneous once a day (at bedtime) (03 Apr 2022 06:34)  metOLazone 5 mg oral tablet: 1 tab(s) orally 3 times a week (03 Apr 2022 06:34)  metoprolol succinate 50 mg oral tablet, extended release: 2 tab(s) orally once a day (03 Apr 2022 06:34)  NovoLOG 100 units/mL subcutaneous solution: subcutaneous 4 times a day (before meals and at bedtime) (03 Apr 2022 06:34)  NovoLOG FlexPen 100 units/mL injectable solution: 10 unit(s) subcutaneous 3 times a day (03 Apr 2022 06:34)  oxycodone-acetaminophen 5 mg-325 mg oral tablet: 1 tab(s) orally 2 times a day (03 Apr 2022 06:34)  Pradaxa 150 mg oral capsule: 1 cap(s) orally 2 times a day (03 Apr 2022 06:34)  pregabalin 100 mg oral capsule: 1 cap(s) orally 3 times a day (03 Apr 2022 06:34)      MEDICATIONS  (STANDING):  allopurinol 100 milliGRAM(s) Oral daily  apixaban 5 milliGRAM(s) Oral two times a day  aspirin enteric coated 81 milliGRAM(s) Oral daily  atorvastatin 40 milliGRAM(s) Oral at bedtime  chlorhexidine 4% Liquid 1 Application(s) Topical <User Schedule>  dextrose 50% Injectable 25 Gram(s) IV Push once  dextrose 50% Injectable 25 Gram(s) IV Push once  dextrose 50% Injectable 12.5 Gram(s) IV Push once  diltiazem    milliGRAM(s) Oral daily  hydrALAZINE 25 milliGRAM(s) Oral three times a day  insulin lispro (ADMELOG) corrective regimen sliding scale   SubCutaneous at bedtime  insulin lispro (ADMELOG) corrective regimen sliding scale   SubCutaneous three times a day before meals  lidocaine   4% Patch 1 Patch Transdermal daily  metoprolol succinate  milliGRAM(s) Oral daily  polyethylene glycol 3350 17 Gram(s) Oral two times a day  povidone iodine 10% Solution 1 Application(s) Topical daily  QUEtiapine 25 milliGRAM(s) Oral at bedtime  senna 2 Tablet(s) Oral at bedtime  sevelamer carbonate 1600 milliGRAM(s) Oral three times a day with meals  sodium chloride 0.9%. 1000 milliLiter(s) (50 mL/Hr) IV Continuous <Continuous>      TELEMETRY: afibhr 80  approx 2.0 sec pause 	    ECG:  	  RADIOLOGY:   DIAGNOSTIC TESTING:  [ ] Echocardiogram:  [ ]  Catheterization:  [ ] Stress Test:    OTHER: 	    LABS:	 	        05-08    135  |  96  |  21  ----------------------------<  252<H>  4.1   |  25  |  5.39<H>    Ca    9.0      08 May 2022 10:30  Phos  3.7     05-08  Mg     2.4     05-08

## 2022-05-09 NOTE — PROGRESS NOTE ADULT - SUBJECTIVE AND OBJECTIVE BOX
[facility]   [phone]  RENAL FOLLOW UP NOTE  --------------------------------------------------------------------------------  Chief Complaint:    24 hour events/subjective:    Patient seen and examined with Dr. Art steward,    PAST HISTORY  --------------------------------------------------------------------------------  No significant changes to PMH, PSH, FHx, SHx, unless otherwise noted    ALLERGIES & MEDICATIONS  --------------------------------------------------------------------------------  Allergies    nitrofurantoin (Nephrotoxicity)    Intolerances      Standing Inpatient Medications  allopurinol 100 milliGRAM(s) Oral daily  apixaban 5 milliGRAM(s) Oral two times a day  aspirin enteric coated 81 milliGRAM(s) Oral daily  atorvastatin 40 milliGRAM(s) Oral at bedtime  chlorhexidine 4% Liquid 1 Application(s) Topical <User Schedule>  dextrose 50% Injectable 25 Gram(s) IV Push once  dextrose 50% Injectable 25 Gram(s) IV Push once  dextrose 50% Injectable 12.5 Gram(s) IV Push once  diltiazem    milliGRAM(s) Oral daily  hydrALAZINE 25 milliGRAM(s) Oral three times a day  insulin lispro (ADMELOG) corrective regimen sliding scale   SubCutaneous at bedtime  insulin lispro (ADMELOG) corrective regimen sliding scale   SubCutaneous three times a day before meals  lidocaine   4% Patch 1 Patch Transdermal daily  metoprolol succinate  milliGRAM(s) Oral daily  polyethylene glycol 3350 17 Gram(s) Oral two times a day  povidone iodine 10% Solution 1 Application(s) Topical daily  QUEtiapine 25 milliGRAM(s) Oral at bedtime  senna 2 Tablet(s) Oral at bedtime  sevelamer carbonate 1600 milliGRAM(s) Oral three times a day with meals  sodium chloride 0.9%. 1000 milliLiter(s) IV Continuous <Continuous>    PRN Inpatient Medications  bisacodyl 5 milliGRAM(s) Oral every 12 hours PRN  dextrose Oral Gel 15 Gram(s) Oral once PRN  sodium chloride 0.9% lock flush 10 milliLiter(s) IV Push every 1 hour PRN      REVIEW OF SYSTEMS  --------------------------------------------------------------------------------    Gen: denies fevers/chills,  CVS: denies chest pain/palpitations  Resp: denies SOB/Cough  GI: Denies N/V/Abd pain  : Denies dysuria    VITALS/PHYSICAL EXAM  --------------------------------------------------------------------------------  T(C): 36 (05-09-22 @ 11:10), Max: 36.7 (05-08-22 @ 20:41)  HR: 99 (05-09-22 @ 11:10) (83 - 99)  BP: 159/63 (05-09-22 @ 11:10) (134/66 - 159/63)  RR: 17 (05-09-22 @ 11:10) (17 - 19)  SpO2: 97% (05-09-22 @ 11:10) (96% - 97%)  Wt(kg): --        05-09-22 @ 07:01  -  05-09-22 @ 14:05  --------------------------------------------------------  IN: 150 mL / OUT: 0 mL / NET: 150 mL      Physical Exam:  	              Gen: confused   	Pulm: Decreased breath sounds b/l bases.  	CV: No JVD. IRR,   	Abd: +BS, soft, nontender, softly distended, obese               Extremity no edema              Extremity LE: + hyperpigmented bilateral LE with no edema              Vascular: R IJ HD catheter, L arm AVF     LABS/STUDIES  --------------------------------------------------------------------------------    135  |  96  |  21  ----------------------------<  252      [05-08-22 @ 10:30]  4.1   |  25  |  5.39        Ca     9.0     [05-08-22 @ 10:30]      Mg     2.4     [05-08-22 @ 10:30]      Phos  3.7     [05-08-22 @ 10:30]            Creatinine Trend:  SCr 5.39 [05-08 @ 10:30]  SCr 6.40 [05-07 @ 06:42]  SCr 4.44 [05-06 @ 07:57]  SCr 7.41 [05-06 @ 00:06]  SCr 7.60 [05-05 @ 13:04]                  Iron 16, TIBC 239, %sat 7      [04-15-22 @ 10:16]  Ferritin 172      [04-15-22 @ 09:05]  PTH -- (Ca 8.3)      [04-15-22 @ 12:18]   150  PTH -- (Ca --)      [04-03-22 @ 23:06]   97  HbA1c 11.7      [11-07-19 @ 09:14]  TSH 1.98      [04-05-22 @ 05:19]    DEREJE: titer Negative, pattern Negative      [04-11-22 @ 16:24]  C3 Complement 61      [04-11-22 @ 16:56]  C4 Complement 28      [04-11-22 @ 16:56]  ANCA: cANCA Negative, pANCA Negative, atypical ANCA Indeterminate Method interference due to DEREJE fluorescence      [04-11-22 @ 16:24]  anti-GBM 3      [04-11-22 @ 19:27]  Free Light Chains: kappa 16.92, lambda 12.63, ratio = 1.34      [04-11 @ 16:56]  Immunofixation Serum: No Monoclonal Band Identified    Reference Range: None Detected      [04-11-22 @ 16:56]  SPEP Interpretation: Normal Electrophoresis Pattern      [04-11-22 @ 16:56]

## 2022-05-09 NOTE — PROGRESS NOTE ADULT - ASSESSMENT
67 year old gentleman with PMH of CAD, NSTEMI s/p 3 stents in 2014 (stents to LAD, proximal and distal LCx), ischemic cardiomyopathy, HTN for 10 years, T2DM for 26 years c/b peripheral neuropathy, CVA, TIA, Afib on Pradaxa, chronic RLE wound, L carotid stenosis s/p endarterectomy (2014) just recently admitted  to the [facility] on 2/19/2022 with acute onset chest pain for one day found to have an NSTEMI, CAD, s/p PCI in setting of increased AF rates off bb for 3 days and resolved chest pain, his CKMB peaked and Echo noted with EF 60%,normal LV function, mild TR, mild VA. He was s/p [facility] with 85% proximal LAD s/p balloon angioplasty and LULU. He presented to [facility] ED with decreased urine output over the week. [facility] and was started  on Nitrofurantoin. Pt is still taking Nitrofurantoin w/ 5 days left. He came to the ED due to worsening symptoms. Pt reports having a similar incident 4-5 years ago that resolved spontaneously. He reports increased leg edema Dyspnea worse on exertion. his baseline Serum creatinine is in the 2.0 to 2.3 mg/dl range. ANT with serum creatinine of 8.29 with bun 144 and k 5.5      1- ANT on ckd III ---> ESRD likely   2- chf chronic   3- hyperphosphatemia /shpt   4- anemia   5- hyperlipidemia   6- HTN /a fib  7- TIA hx       no hd today   renvela 2 tab with meals   anemia - epogen 47662 Units TIW with HD.  PT  seroquel 25 mg daily   pt has carotid artery stenosis and now is on HD. vascular follow up, can have CTA done 67 year old gentleman with PMH of CAD, NSTEMI s/p 3 stents in 2014 (stents to LAD, proximal and distal LCx), ischemic cardiomyopathy, HTN for 10 years, T2DM for 26 years c/b peripheral neuropathy, CVA, TIA, Afib on Pradaxa, chronic RLE wound, L carotid stenosis s/p endarterectomy (2014) just recently admitted  to the [facility] on 2/19/2022 with acute onset chest pain for one day found to have an NSTEMI, CAD, s/p PCI in setting of increased AF rates off bb for 3 days and resolved chest pain, his CKMB peaked and Echo noted with EF 60%,normal LV function, mild TR, mild TN. He was s/p [facility] with 85% proximal LAD s/p balloon angioplasty and LULU. He presented to [facility] ED with decreased urine output over the week. [facility] and was started  on Nitrofurantoin. Pt is still taking Nitrofurantoin w/ 5 days left. He came to the ED due to worsening symptoms. Pt reports having a similar incident 4-5 years ago that resolved spontaneously. He reports increased leg edema Dyspnea worse on exertion. his baseline Serum creatinine is in the 2.0 to 2.3 mg/dl range. ANT with serum creatinine of 8.29 with bun 144 and k 5.5      1- ANT on ckd III ---> ESRD likely   2- chf chronic   3- hyperphosphatemia /shpt   4- anemia   5- hyperlipidemia   6- HTN /a fib  7- TIA hx       no hd today   renvela 2 tab with meals   anemia - epogen 34633 Units TIW with HD.  PT  seroquel 25 mg daily   pt has carotid artery stenosis and now is on HD. vascular/neuro  follow up, can have CTA done.

## 2022-05-10 NOTE — PROGRESS NOTE ADULT - SUBJECTIVE AND OBJECTIVE BOX
CARDIOLOGY FOLLOW UP - Dr. Mazariegos  DATE OF SERVICE: 5/10/22     CC  agitated on exam       REVIEW OF SYSTEMS:  DEVANG   PHYSICAL EXAM:  T(C): 36.2 (05-10-22 @ 05:32), Max: 36.6 (05-09-22 @ 20:03)  HR: 114 (05-10-22 @ 05:32) (87 - 114)  BP: 173/70 (05-10-22 @ 05:32) (146/71 - 173/70)  RR: 18 (05-10-22 @ 05:32) (17 - 18)  SpO2: 97% (05-10-22 @ 05:32) (95% - 97%)  Wt(kg): --  I&O's Summary    09 May 2022 07:01  -  10 May 2022 07:00  --------------------------------------------------------  IN: 300 mL / OUT: 0 mL / NET: 300 mL    10 May 2022 07:01  -  10 May 2022 10:57  --------------------------------------------------------  IN: 150 mL / OUT: 0 mL / NET: 150 mL        Appearance: Normal	  Cardiovascular: Normal S1 S2, irreg   Respiratory: Lungs clear to auscultation	  Gastrointestinal:  Soft, Non-tender, + BS	  Extremities: Normal range of motion, No clubbing, cyanosis or edema      Home Medications:  allopurinol 100 mg oral tablet: 1 tab(s) orally once a day (03 Apr 2022 06:34)  aspirin 81 mg oral delayed release tablet: 1 tab(s) orally once a day (03 Apr 2022 06:34)  bumetanide 2 mg oral tablet: 1 tab(s) orally once a day (03 Apr 2022 06:34)  insulin glargine 100 units/mL subcutaneous solution: 25 unit(s) subcutaneous once a day (at bedtime) (03 Apr 2022 06:34)  metOLazone 5 mg oral tablet: 1 tab(s) orally 3 times a week (03 Apr 2022 06:34)  metoprolol succinate 50 mg oral tablet, extended release: 2 tab(s) orally once a day (03 Apr 2022 06:34)  NovoLOG 100 units/mL subcutaneous solution: subcutaneous 4 times a day (before meals and at bedtime) (03 Apr 2022 06:34)  NovoLOG FlexPen 100 units/mL injectable solution: 10 unit(s) subcutaneous 3 times a day (03 Apr 2022 06:34)  oxycodone-acetaminophen 5 mg-325 mg oral tablet: 1 tab(s) orally 2 times a day (03 Apr 2022 06:34)  Pradaxa 150 mg oral capsule: 1 cap(s) orally 2 times a day (03 Apr 2022 06:34)  pregabalin 100 mg oral capsule: 1 cap(s) orally 3 times a day (03 Apr 2022 06:34)      MEDICATIONS  (STANDING):  allopurinol 100 milliGRAM(s) Oral daily  apixaban 5 milliGRAM(s) Oral two times a day  aspirin enteric coated 81 milliGRAM(s) Oral daily  atorvastatin 40 milliGRAM(s) Oral at bedtime  chlorhexidine 4% Liquid 1 Application(s) Topical <User Schedule>  dextrose 50% Injectable 25 Gram(s) IV Push once  dextrose 50% Injectable 25 Gram(s) IV Push once  dextrose 50% Injectable 12.5 Gram(s) IV Push once  diltiazem    milliGRAM(s) Oral daily  hydrALAZINE 25 milliGRAM(s) Oral three times a day  insulin lispro (ADMELOG) corrective regimen sliding scale   SubCutaneous at bedtime  insulin lispro (ADMELOG) corrective regimen sliding scale   SubCutaneous three times a day before meals  lidocaine   4% Patch 1 Patch Transdermal daily  metoprolol succinate  milliGRAM(s) Oral daily  polyethylene glycol 3350 17 Gram(s) Oral two times a day  povidone iodine 10% Solution 1 Application(s) Topical daily  QUEtiapine 25 milliGRAM(s) Oral at bedtime  senna 2 Tablet(s) Oral at bedtime  sevelamer carbonate 1600 milliGRAM(s) Oral three times a day with meals  sodium chloride 0.9%. 1000 milliLiter(s) (50 mL/Hr) IV Continuous <Continuous>      TELEMETRY: afib hr 90-100s 	    ECG:  	  RADIOLOGY:   DIAGNOSTIC TESTING:  [ ] Echocardiogram:  [ ]  Catheterization:  [ ] Stress Test:    OTHER: 	    LABS:

## 2022-05-10 NOTE — PROGRESS NOTE ADULT - SUBJECTIVE AND OBJECTIVE BOX
Patient is a 67y old  Male who presents with a chief complaint of Decreased urine output for the past week, leg oedema (10 May 2022 10:57)      DATE OF SERVICE: 05-10-22 @ 12:22    SUBJECTIVE / OVERNIGHT EVENTS: overnight events noted    ROS:  Resp: No cough no sputum production  CVS: No chest pain no palpitations no orthopnea  GI: no N/V/D  alert communicative        MEDICATIONS  (STANDING):  allopurinol 100 milliGRAM(s) Oral daily  apixaban 5 milliGRAM(s) Oral two times a day  aspirin enteric coated 81 milliGRAM(s) Oral daily  atorvastatin 40 milliGRAM(s) Oral at bedtime  chlorhexidine 4% Liquid 1 Application(s) Topical <User Schedule>  dextrose 50% Injectable 25 Gram(s) IV Push once  dextrose 50% Injectable 25 Gram(s) IV Push once  dextrose 50% Injectable 12.5 Gram(s) IV Push once  diltiazem    milliGRAM(s) Oral daily  hydrALAZINE 25 milliGRAM(s) Oral three times a day  insulin lispro (ADMELOG) corrective regimen sliding scale   SubCutaneous at bedtime  insulin lispro (ADMELOG) corrective regimen sliding scale   SubCutaneous three times a day before meals  lidocaine   4% Patch 1 Patch Transdermal daily  metoprolol succinate  milliGRAM(s) Oral daily  polyethylene glycol 3350 17 Gram(s) Oral two times a day  povidone iodine 10% Solution 1 Application(s) Topical daily  QUEtiapine 25 milliGRAM(s) Oral at bedtime  senna 2 Tablet(s) Oral at bedtime  sevelamer carbonate 1600 milliGRAM(s) Oral three times a day with meals    MEDICATIONS  (PRN):  bisacodyl 5 milliGRAM(s) Oral every 12 hours PRN Constipation  dextrose Oral Gel 15 Gram(s) Oral once PRN Blood Glucose LESS THAN 70 milliGRAM(s)/deciliter  sodium chloride 0.9% lock flush 10 milliLiter(s) IV Push every 1 hour PRN Pre/post blood products, medications, blood draw, and to maintain line patency        CAPILLARY BLOOD GLUCOSE      POCT Blood Glucose.: 273 mg/dL (10 May 2022 12:01)  POCT Blood Glucose.: 164 mg/dL (10 May 2022 08:13)  POCT Blood Glucose.: 200 mg/dL (09 May 2022 20:52)  POCT Blood Glucose.: 159 mg/dL (09 May 2022 16:52)  POCT Blood Glucose.: 225 mg/dL (09 May 2022 12:47)    I&O's Summary    09 May 2022 07:01  -  10 May 2022 07:00  --------------------------------------------------------  IN: 300 mL / OUT: 0 mL / NET: 300 mL    10 May 2022 07:01  -  10 May 2022 12:22  --------------------------------------------------------  IN: 150 mL / OUT: 0 mL / NET: 150 mL        Vital Signs Last 24 Hrs  T(C): 36.3 (10 May 2022 11:47), Max: 36.6 (09 May 2022 20:03)  T(F): 97.4 (10 May 2022 11:47), Max: 97.9 (09 May 2022 20:03)  HR: 88 (10 May 2022 11:47) (87 - 114)  BP: 134/72 (10 May 2022 11:47) (134/72 - 173/70)  BP(mean): --  RR: 18 (10 May 2022 11:47) (17 - 18)  SpO2: 97% (10 May 2022 11:47) (95% - 97%)    PE  HEENT:  , PERRL, EOMI	  Cardiovascular: Normal S1 S2,  Respiratory: Lungs clear to auscultation	  Gastrointestinal:  Soft, Non-tender, + BS	  Neurologic: Non-focal alert and converational  Extremities: left toe bandaged    LABS:                      All consultant(s) notes reviewed and care discussed with other providers        Contact Number, Dr King [phone]

## 2022-05-10 NOTE — PROGRESS NOTE ADULT - ASSESSMENT
·  Problem:ESRD.co hemodialysis per renal  to cont as per renal     Problem/Plan - 2:  ·  Problem: Chronic atrial fibrillation. c/w a/c  cards f/u     Problem/Plan - 3:  ·  Problem: Cystitis.   ID follow up appreciated        Problem/Plan - 4:  ·  Problem: Type 2 diabetes mellitus. c/w insulin   endo f/u      Problem/Plan - 5:  ·  Problem: CAD (coronary artery disease).   rapid a fib  meds adjusted  hr stable  c/w a/c       Persistent encephalopathy   neuro follow up appreciated  MRI ordered  carotid duplex ordered  continue to follow recommendations   TSH normal earlier this admission

## 2022-05-10 NOTE — PROGRESS NOTE ADULT - SUBJECTIVE AND OBJECTIVE BOX
[facility]   [phone]  RENAL FOLLOW UP NOTE  --------------------------------------------------------------------------------  Chief Complaint:    24 hour events/subjective:    Patient seen and examined on HD  awake, alert    PAST HISTORY  --------------------------------------------------------------------------------  No significant changes to PMH, PSH, FHx, SHx, unless otherwise noted    ALLERGIES & MEDICATIONS  --------------------------------------------------------------------------------  Allergies    nitrofurantoin (Nephrotoxicity)    Intolerances      Standing Inpatient Medications  allopurinol 100 milliGRAM(s) Oral daily  apixaban 5 milliGRAM(s) Oral two times a day  aspirin enteric coated 81 milliGRAM(s) Oral daily  atorvastatin 40 milliGRAM(s) Oral at bedtime  chlorhexidine 4% Liquid 1 Application(s) Topical <User Schedule>  dextrose 50% Injectable 25 Gram(s) IV Push once  dextrose 50% Injectable 25 Gram(s) IV Push once  dextrose 50% Injectable 12.5 Gram(s) IV Push once  diltiazem    milliGRAM(s) Oral daily  hydrALAZINE 25 milliGRAM(s) Oral three times a day  insulin lispro (ADMELOG) corrective regimen sliding scale   SubCutaneous three times a day before meals  insulin lispro (ADMELOG) corrective regimen sliding scale   SubCutaneous at bedtime  lidocaine   4% Patch 1 Patch Transdermal daily  metoprolol succinate  milliGRAM(s) Oral daily  polyethylene glycol 3350 17 Gram(s) Oral two times a day  povidone iodine 10% Solution 1 Application(s) Topical daily  QUEtiapine 25 milliGRAM(s) Oral at bedtime  senna 2 Tablet(s) Oral at bedtime  sevelamer carbonate 1600 milliGRAM(s) Oral three times a day with meals    PRN Inpatient Medications  bisacodyl 5 milliGRAM(s) Oral every 12 hours PRN  dextrose Oral Gel 15 Gram(s) Oral once PRN  sodium chloride 0.9% lock flush 10 milliLiter(s) IV Push every 1 hour PRN      REVIEW OF SYSTEMS  --------------------------------------------------------------------------------    Gen: denies fevers/chills,  CVS: denies chest pain/palpitations  Resp: denies SOB/Cough  GI: Denies N/V/Abd pain  : Denies dysuria    VITALS/PHYSICAL EXAM  --------------------------------------------------------------------------------  T(C): 36.4 (05-10-22 @ 13:40), Max: 36.6 (05-09-22 @ 20:03)  HR: 93 (05-10-22 @ 13:40) (87 - 114)  BP: 142/78 (05-10-22 @ 13:40) (134/72 - 173/70)  RR: 18 (05-10-22 @ 13:40) (17 - 18)  SpO2: 98% (05-10-22 @ 13:40) (95% - 98%)  Wt(kg): --        05-09-22 @ 07:01  -  05-10-22 @ 07:00  --------------------------------------------------------  IN: 300 mL / OUT: 0 mL / NET: 300 mL    05-10-22 @ 07:01  -  05-10-22 @ 16:08  --------------------------------------------------------  IN: 150 mL / OUT: 0 mL / NET: 150 mL      Physical Exam:  	              Gen: awake, alert, appears comfortable  	Pulm: Decreased breath sounds b/l bases.  	CV: No JVD. IRR,   	Abd: +BS, soft, nontender, softly distended, obese               Extremity no edema              Extremity LE: + hyperpigmented bilateral LE with no edema              Vascular: R IJ HD catheter, L arm AVF       LABS/STUDIES  --------------------------------------------------------------------------------              9.0    7.84  >-----------<  269      [05-10-22 @ 15:00]              31.1     135  |  93  |  44  ----------------------------<  256      [05-10-22 @ 15:00]  4.5   |  25  |  8.68        Ca     8.9     [05-10-22 @ 15:00]      Phos  4.4     [05-10-22 @ 15:00]            Creatinine Trend:  SCr 8.68 [05-10 @ 15:00]  SCr 5.39 [05-08 @ 10:30]  SCr 6.40 [05-07 @ 06:42]  SCr 4.44 [05-06 @ 07:57]  SCr 7.41 [05-06 @ 00:06]                  Iron 16, TIBC 239, %sat 7      [04-15-22 @ 10:16]  Ferritin 172      [04-15-22 @ 09:05]  PTH -- (Ca 8.3)      [04-15-22 @ 12:18]   150  PTH -- (Ca --)      [04-03-22 @ 23:06]   97  HbA1c 11.7      [11-07-19 @ 09:14]  TSH 1.98      [04-05-22 @ 05:19]    DEREJE: titer Negative, pattern Negative      [04-11-22 @ 16:24]  C3 Complement 61      [04-11-22 @ 16:56]  C4 Complement 28      [04-11-22 @ 16:56]  ANCA: cANCA Negative, pANCA Negative, atypical ANCA Indeterminate Method interference due to DEREJE fluorescence      [04-11-22 @ 16:24]  anti-GBM 3      [04-11-22 @ 19:27]  Free Light Chains: kappa 16.92, lambda 12.63, ratio = 1.34      [04-11 @ 16:56]  Immunofixation Serum: No Monoclonal Band Identified    Reference Range: None Detected      [04-11-22 @ 16:56]  SPEP Interpretation: Normal Electrophoresis Pattern      [04-11-22 @ 16:56]

## 2022-05-10 NOTE — PROGRESS NOTE ADULT - NS ATTEND AMEND GEN_ALL_CORE FT
seen on hd   still confused but seems more responsive  heart IRR IRR   lungs decrease bs   ext no edema stasis changes    ESRD  HD as planned above  see hd flow sheet

## 2022-05-10 NOTE — PROGRESS NOTE ADULT - ASSESSMENT
A/P    68 y/o M with history of CAD, s/p multiple PCI, with most recent 2/22 PCI of LAD in setting of NSTEMI, on ASA only (pradaxa), chronic atrial fibrillation maintained on Pradaxa, essential HTN, type 2 DM previously on oral medications but on insulin, diabetic neuropathy with chronic RIGHT LE venous stasis changes>left, LEFT foot ulcer seen by podiatry, past endarterectomy LEFT CEA in 2014 presenting with 1 week of decreased urine output, cystitis with hematuria     #ANT on CKD, new HD  -oliguric renal failure in setting of UTI/cystitis  -etiology ??  -New HD for uremia, renal failure  -sp rrt 4/12 for uncontrolled bleeding sp  right groin shiley removal  - monitor h/h - stable  -s/p L AVG 4/18  -s/p permacath placement 4/20  -renal f/ u    #AMS/Lethargy  -recent ams from pain meds  -AMS sp RRT 4/27  likely secondary to pain med   -repeat CT 4/27 unremarkable --  discontinued Percocet- improved   -med f/u   -pending MRI     #Acute on chronic HFpEF  -stable  -continue volume removal with HD  -c/w  bb    #Chronic AF  -rates stable   -c/w metoprolol succ 100 mg qd  -c/w dilt cd 120mg daily    -c/w eliquis 5 mg BID for now - heme stable     #HTN  -stable  -c/w meds       #CAD, s/p PCI, most recent 2/2022  -stable, cont asa  -off plavix due to a/c indication  -statin     #R carotid stenosis  -cont med tx  -MRA noted with Greater than 75% stenosis of the right distal common carotid artery. Approximately 60% stenosis of the proximal left internal carotid artery.  -Continue ASA and Statin Therapy  -per renal-  to have cta carotids and to have vascular /neuro evaluate - pending carotid doppler

## 2022-05-10 NOTE — PROGRESS NOTE ADULT - ASSESSMENT
67 year old gentleman with PMH of CAD, NSTEMI s/p 3 stents in 2014 (stents to LAD, proximal and distal LCx), ischemic cardiomyopathy, HTN for 10 years, T2DM for 26 years c/b peripheral neuropathy, CVA, TIA, Afib on Pradaxa, chronic RLE wound, L carotid stenosis s/p endarterectomy (2014) just recently admitted  to the [facility] on 2/19/2022 with acute onset chest pain for one day found to have an NSTEMI, CAD, s/p PCI in setting of increased AF rates off bb for 3 days and resolved chest pain, his CKMB peaked and Echo noted with EF 60%,normal LV function, mild TR, mild MT. He was s/p [facility] with 85% proximal LAD s/p balloon angioplasty and LULU. He presented to [facility] ED with decreased urine output over the week. Mr. Stevens went to [facility] for hematuria and was started  on Nitrofurantoin. Pt is still taking Nitrofurantoin w/ 5 days left. He came to the ED due to worsening symptoms. Pt reports having a similar incident 4-5 years ago that resolved spontaneously. He reports increased leg edema Dyspnea worse on exertion. his baseline Serum creatinine is in the 2.0 to 2.3 mg/dl range. ANT with serum creatinine of 8.29 with bun 144 and k 5.5      1- ANT on ckd III ---> ESRD likely   2- chf chronic   3- hyperphosphatemia /shpt   4- anemia   5- hyperlipidemia   6- HTN /a fib  7- TIA hx   8- hx of carotid stenosis      HD today  F200, 225 min, , , 0.5L fluid removal  see HD flowsheet  check clearances today  check phos  renvela 2 tab with meals   anemia - epogen 83388 Units TIW with HD.  PT  seroquel 25 mg daily   hx of carotid stenosis, was unable to have CTA done prior 2/2 CKD   now on HD,    neuro follow up   to have carotid dopplers    vascular follow up   awaiting MRI head   if needed can have CTA done

## 2022-05-10 NOTE — PROGRESS NOTE ADULT - ASSESSMENT
Impression:  67 year old gentleman pmhx CAD s/p MI/PCI/CABG, hx TIA, afib on pradaxa, HTN, DM2, PVD, gout, L CEA 2014, CKD admitted to ED with ANT, tremors, confusion and lethargy. Receiving new hemodialysis. CT head no acute changes. Mental status improved today. Pt denies any headaches, no slurred speech, no focal facial or limb weakness. Has chronic gout with bilateral finger swelling, pain.     Diagnosis:  Presentation consistent with toxic metabolic encephalopathy secondary to ANT/CKD, CT head no acute changes, no focal findings on exam, no evidence of acute neurological event at this time.   That said, his persistent encephalopathy with leg weakness is concerning.      Recommendations:    MRI brain to rule out structural lesion given his persistent altered mentation    Given history of carotid stenosis will check a carotid ultrasound.  Per Dr. Navas can consider a CTA head and neck which would be more sensitive and specific.  Usually, carotid dopplers provide enough information, but if the CTA head and neck can be done without risk, then it can be considered.    Rechecking TSH along with a free T4    Ordered a B12 for completeness     Continue HD as per renal    Continue to manage BUN to minimize uremic encephalopathy; currently well managed but effects can sometimes persist    Toxic/metabolic/infectious workup as per primary team     Continue stroke prophylaxis- on AC for afib; no no neurological indication for concomitant antiplatelet unless there is non-neurological indication for its continued use; optimize hypertension/glycemic control    consider workup for MOR, can potentially be done inpatient vs outpatient, he is generally more confused in the mornings which can sometimes be seen in setting of sleep apnea

## 2022-05-10 NOTE — PROGRESS NOTE ADULT - SUBJECTIVE AND OBJECTIVE BOX
Admitting Diagnosis:  Chronic kidney disease [N18.9]  CHRONIC KIDNEY DISEASE, UNSPECIFIED    Background:  This is a 67 year old gentleman pmhx CAD s/p MI/PCI/CABG, hx TIA, afib on rivaroxaban, HTN, DM2, PVD, gout, L CEA 2014, and CKD who presented to [facility] 4/11/22 with ANT, tremors, confusion and lethargy.  CT head no acute changes.  S/p initiation of hemodialysis.  Mental status has improved dramatically.  Pt denies any headaches, no slurred speech, no hemiparesis.  He has chronic gout with bilateral finger swelling, pain.  He also has chronic neuropathy.  Both legs are weak.      Interval Hx:  persistent encephalopathy prompting re-consultation.  In addition, concern over history of carotid stenosis.      ******    Past Medical History:  HTN (hypertension), benign [401.1]  HLD (hyperlipidemia) [272.4]  DM type 2 (diabetes mellitus, type 2) [250.00]  TIA (transient ischemic attack) [435.9]  Atrial fibrillation [427.31]  MI (myocardial infarction) [410.90]  circa 2014 and 2022.  CAD (coronary artery disease) [414.00]  Neuropathy [G62.9]  Stage 3 chronic kidney disease [N18.30]  2019 novel coronavirus disease (COVID-19) [U07.1]  12/2021.  Received the COVID-19 vaccine x 2 as of April 2022.    Past Surgical History:  Status post angioplasty with stent [V45.82]  LULU x 3 2/7/2014  S/P carotid endarterectomy [Z98.89]  left  S/P CABG (coronary artery bypass graft) [Z95.1]        Social History:  No toxic habits    Family History:  FAMILY HISTORY:  Family history of heart disease    Medications:  allopurinol 100 milliGRAM(s) Oral daily  apixaban 5 milliGRAM(s) Oral two times a day  aspirin enteric coated 81 milliGRAM(s) Oral daily  atorvastatin 40 milliGRAM(s) Oral at bedtime  bisacodyl 5 milliGRAM(s) Oral every 12 hours PRN  chlorhexidine 4% Liquid 1 Application(s) Topical <User Schedule>  dextrose 50% Injectable 25 Gram(s) IV Push once  dextrose 50% Injectable 25 Gram(s) IV Push once  dextrose 50% Injectable 12.5 Gram(s) IV Push once  dextrose Oral Gel 15 Gram(s) Oral once PRN  diltiazem    milliGRAM(s) Oral daily  hydrALAZINE 25 milliGRAM(s) Oral three times a day  insulin lispro (ADMELOG) corrective regimen sliding scale   SubCutaneous at bedtime  insulin lispro (ADMELOG) corrective regimen sliding scale   SubCutaneous three times a day before meals  lidocaine   4% Patch 1 Patch Transdermal daily  metoprolol succinate  milliGRAM(s) Oral daily  polyethylene glycol 3350 17 Gram(s) Oral two times a day  povidone iodine 10% Solution 1 Application(s) Topical daily  QUEtiapine 25 milliGRAM(s) Oral at bedtime  senna 2 Tablet(s) Oral at bedtime  sevelamer carbonate 1600 milliGRAM(s) Oral three times a day with meals  sodium chloride 0.9% lock flush 10 milliLiter(s) IV Push every 1 hour PRN  sodium chloride 0.9%. 1000 milliLiter(s) IV Continuous <Continuous>      Labs:          CAPILLARY BLOOD GLUCOSE      POCT Blood Glucose.: 164 mg/dL (10 May 2022 08:13)  POCT Blood Glucose.: 200 mg/dL (09 May 2022 20:52)  POCT Blood Glucose.: 159 mg/dL (09 May 2022 16:52)  POCT Blood Glucose.: 225 mg/dL (09 May 2022 12:47)              Vitals:  Vital Signs Last 24 Hrs  T(C): 36.2 (10 May 2022 05:32), Max: 36.6 (09 May 2022 20:03)  T(F): 97.2 (10 May 2022 05:32), Max: 97.9 (09 May 2022 20:03)  HR: 114 (10 May 2022 05:32) (87 - 114)  BP: 173/70 (10 May 2022 05:32) (146/71 - 173/70)  BP(mean): --  RR: 18 (10 May 2022 05:32) (17 - 18)  SpO2: 97% (10 May 2022 05:32) (95% - 97%)    ROS: as per HPI.          NEUROLOGICAL EXAM:    Mental status: Awake, alert, and in no apparent distress. Oriented to person, place, and year, but not month or day.  Language intact.  Attention impaired.      Cranial Nerves: Pupils were equal, round, reactive to light. Extraocular movements were intact. B BTT Facial sensation was intact to light touch. There was no facial asymmetry. The palate was upgoing symmetrically and tongue was midline. Hearing acuity was intact to finger rub AU. Shoulder shrug was full bilaterally    Motor exam: Bulk and tone were normal. No downward drift in arms.  Right leg minimally antigravity, left leg plane of bed and bends knee but heel not coming up off the bed.  Fine finger movements were symmetric. There was bilateral symmetric pronator drift    Reflexes: DTRs depressed, flexor plantars.     Sensation: Intact to noxious, seems intact to light touch, due to decreased attention did not assess other modalities     Coordination: no gross dysmetria    Gait: deferred    Imaging:    ACC: [id number] EXAM:  CT BRAIN                        PROCEDURE DATE:  04/09/2022      INTERPRETATION:  CLINICAL INFORMATION:  Altered mental status, on   anticoagulation .    TECHNIQUE: Multiple contiguous axial images were acquired from the   skullbase to the vertex without the administration of intravenous   contrast.  Coronal and sagittal reformations were made.    COMPARISON: CT head 10/21/2021, brain MRI 02/23/2014.    FINDINGS:    Scattered lacunar infarcts in the bilateral cerebralhemispheres are   grossly stable compared to the 10/21/2021 head CT. No new additional   infarcts are noted over the time interval.    No acute intracranial hemorrhage, mass effect, or midline shift. The   brain demonstrates periventricular hypoattenuation, nonspecific in   etiology but likely representing chronic microvascular ischemic changes.    No abnormal extra-axial fluid collections are present.    The ventricles and sulci demonstrate age appropriate involutional   changes.  The basal cisterns are patent.    The orbits are unremarkable. The paranasal sinuses are clear. The mastoid   air cells are clear. The calvarium is intact.    IMPRESSION:    No acute intracranial abnormality is noted. If the patient has new and   persistent symptoms, consider short interval follow-up head CT or brain   MRI.    --- End of Report ---    GATITO VILLARREAL MD; Resident Radiologist  This document has been electronically signed.  JESSE CORONA MD; Attending Radiologist  This document has been electronically signed. Apr 9 2022  2:00PM

## 2022-05-10 NOTE — CHART NOTE - NSCHARTNOTEFT_GEN_A_CORE
Nutrition Follow Up Note  Patient seen for: malnutrition initial follow-up. Chart reviewed, events noted.     "This is a 67 year old gentleman pmhx CAD s/p MI/PCI/CABG, hx TIA, afib on rivaroxaban, HTN, DM2, PVD, gout, L CEA , and CKD who presented to [facility] 22 with ANT, tremors, confusion and lethargy. Receiving new hemodialysis.     Source: [x] Patient       [x] Medical Record        [x] RN        [] Family at bedside       [] Other:    -If unable to interview patient: [] Trach/Vent/BiPAP  [] Disoriented/confused/inappropriate to interview    Diet Order:   Diet, Consistent Carbohydrate Renal/No Snacks (22)    - Is current order appropriate/adequate? [] Yes  [x]  No: not adequate    - PO intake :   [] >75%  Adequate    [] 50-75%  Fair       [x] <50%  Poor    - Nutrition-related concerns:      - Per flowsheets, pt with prolonged poor PO intake. Per RN, pt eats better when wife is present and encourages him to eat.       - Pt noted as confused. RN reports pt refuses to eat at times, or eats only a few bites of his food.      - Per chart: pt ordered for admelog SSI and renvela      - Per chart: last HD  with 500 ml fluid removed    GI: Pt and RN deny pt with any nausea, vomiting, constipation, diarrhea.  Last BM 5/10 per chart.   Bowel Regimen? [x] Yes (dulcolax, miralax, senna)  [] No      Weights:   Daily Weight in k.1 (-), 114.2 (-), 116.2 ()  Dosing wt: 120.9 kg (-)  Weight fluctuations likely secondary to fluid shifts, pt on HD. RD to continue to monitor weight trends as able/available.      MEDICATIONS  (STANDING):  allopurinol 100 milliGRAM(s) Oral daily  apixaban 5 milliGRAM(s) Oral two times a day  aspirin enteric coated 81 milliGRAM(s) Oral daily  atorvastatin 40 milliGRAM(s) Oral at bedtime  chlorhexidine 4% Liquid 1 Application(s) Topical <User Schedule>  dextrose 50% Injectable 25 Gram(s) IV Push once  dextrose 50% Injectable 25 Gram(s) IV Push once  dextrose 50% Injectable 12.5 Gram(s) IV Push once  diltiazem    milliGRAM(s) Oral daily  hydrALAZINE 25 milliGRAM(s) Oral three times a day  insulin lispro (ADMELOG) corrective regimen sliding scale   SubCutaneous three times a day before meals  insulin lispro (ADMELOG) corrective regimen sliding scale   SubCutaneous at bedtime  lidocaine   4% Patch 1 Patch Transdermal daily  metoprolol succinate  milliGRAM(s) Oral daily  polyethylene glycol 3350 17 Gram(s) Oral two times a day  povidone iodine 10% Solution 1 Application(s) Topical daily  QUEtiapine 25 milliGRAM(s) Oral at bedtime  senna 2 Tablet(s) Oral at bedtime  sevelamer carbonate 1600 milliGRAM(s) Oral three times a day with meals  sodium chloride 0.9%. 1000 milliLiter(s) (50 mL/Hr) IV Continuous <Continuous>    MEDICATIONS  (PRN):  bisacodyl 5 milliGRAM(s) Oral every 12 hours PRN Constipation  dextrose Oral Gel 15 Gram(s) Oral once PRN Blood Glucose LESS THAN 70 milliGRAM(s)/deciliter  sodium chloride 0.9% lock flush 10 milliLiter(s) IV Push every 1 hour PRN Pre/post blood products, medications, blood draw, and to maintain line patency      Pertinent Labs:   A1C with Estimated Average Glucose Result: 10.1 % (22 @ 12:18)    Finger Sticks:  POCT Blood Glucose.: 164 mg/dL (05-10 @ 08:13)  POCT Blood Glucose.: 200 mg/dL ( @ 20:52)  POCT Blood Glucose.: 159 mg/dL ( @ 16:52)  POCT Blood Glucose.: 225 mg/dL ( @ 12:47)      Skin per nursing documentation: No pressure injuries noted.  Edema per nursing documentation: +1 bilateral ankles, feet, and legs    Estimated Needs:   [x] no change since previous assessment  Estimated Energy Needs:  30-35k cristin/kg = 4867-7230 kcal/day  Estimated Protein Needs: 1.2-1.4 g pro/kg =  g pro/day  Estimated Fluid Needs: deferred to team   IBW (78.2kg) was used for energy and protein calculations    Previous Nutrition Diagnosis:  Inadequate oral intake, Increased nutrient needs; Severe acute malnutrition   Nutrition Diagnosis is: [x] ongoing  [] resolved [] not applicable   -Being addressed with PO diet and food preferences.     Nutrition Care Plan:  [x] In Progress  [] Achieved  [] Not applicable    New Nutrition Diagnosis: [x] Not applicable    Nutrition Interventions:     Education Provided   [x] Yes:  Discussed importance of adequate protein-energy consumption to meet estimated nutrient needs. Encouraged intake of meals and oral nutrition supplements as tolerated. Encouraged intake of protein-rich foods first at mealtimes to help preserve lean muscle mass. Reviewed food choices that are high in protein. Also encouraged low phosphorus intake. Pt made aware RD remains available for further questions/concerns.     Recommendations:      1) Continue Consistent Carbohydrate, Renal diet.  2) Recommend add Nepro 1x/day, and Nephro-Cheyanne supplement pending no medical contraindications.  3) Monitor PO intake, GI tolerance, skin integrity, labs, weight, and bowel movement regularity.   4) Honor food preferences as feasible. Assist with meals PRN and encourage PO intake.  5) malnutrition alert in chart   6) recommend obtain updated A1c    Monitoring and Evaluation:   Continue to monitor nutritional intake, tolerance to diet prescription, weights, labs, skin integrity    RD remains available upon request and will follow up per protocol  Radha Huerta, AVRILN, CDN Pager #[phone]

## 2022-05-11 NOTE — CONSULT NOTE ADULT - SUBJECTIVE AND OBJECTIVE BOX
HPI:  67M PMH of CAD, NSTEMI s/p 3 stents in 2014 (stents to LAD, proximal and distal LCx), ischemic cardiomyopathy, HTN for 10 years, T2DM for 26 years c/b peripheral neuropathy, CVA, TIA, Afib on Pradaxa, chronic RLE wound, L carotid stenosis s/p endarterectomy (2014) just recently admitted  to the [facility] on 2/19/2022 with acute onset chest pain for one day found to have an NSTEMI, CAD, s/p PCI in setting of increased AF rates off bb for 3 days and resolved chest pain, his CKMB peaked and Echo noted with EF 60%,normal LV function, mild TR, mild IL. He was s/p LHC with 85% proximal LAD s/p balloon angioplasty and LULU. He presented to [facility] ED with decreased urine output over the week. Carotid duplex showed Calcified atherosclerotic disease seen throughout the carotid systems. There is new occlusion of the left internal carotid artery. There is greater than 70% stenosis involving the distal right common carotid artery as was seen previously. Mild to moderate flow-limiting stenosis is seen at the left external carotid artery. Vascular surgery consulted for carotid stenosis. Pt currently denies vision changes, dizziness, lightheadedness or any other symptoms at this point in time.       PAST MEDICAL & SURGICAL HISTORY:  HTN (hypertension), benign      HLD (hyperlipidemia)      DM type 2 (diabetes mellitus, type 2)      TIA (transient ischemic attack)      Atrial fibrillation      MI (myocardial infarction)  circa 2014 and 2022.      CAD (coronary artery disease)      Neuropathy      Stage 3 chronic kidney disease      2019 novel coronavirus disease (COVID-19)  12/2021.  Received the COVID-19 vaccine x 2 as of April 2022.      Status post angioplasty with stent  LULU x 3 2/7/2014      S/P carotid endarterectomy  left          REVIEW OF SYSTEMS    10 point review of systems was assessed and is unremarkable other than what was mentioned in the HPI    MEDICATIONS  (STANDING):  allopurinol 100 milliGRAM(s) Oral daily  apixaban 5 milliGRAM(s) Oral two times a day  aspirin enteric coated 81 milliGRAM(s) Oral daily  atorvastatin 40 milliGRAM(s) Oral at bedtime  chlorhexidine 4% Liquid 1 Application(s) Topical <User Schedule>  dextrose 50% Injectable 25 Gram(s) IV Push once  dextrose 50% Injectable 25 Gram(s) IV Push once  dextrose 50% Injectable 12.5 Gram(s) IV Push once  diltiazem    milliGRAM(s) Oral daily  hydrALAZINE 25 milliGRAM(s) Oral three times a day  insulin lispro (ADMELOG) corrective regimen sliding scale   SubCutaneous at bedtime  insulin lispro (ADMELOG) corrective regimen sliding scale   SubCutaneous three times a day before meals  lidocaine   4% Patch 1 Patch Transdermal daily  metoprolol succinate  milliGRAM(s) Oral daily  mupirocin 2% Ointment 1 Application(s) Both Nostrils two times a day  polyethylene glycol 3350 17 Gram(s) Oral two times a day  povidone iodine 10% Solution 1 Application(s) Topical daily  QUEtiapine 25 milliGRAM(s) Oral at bedtime  senna 2 Tablet(s) Oral at bedtime  sevelamer carbonate 1600 milliGRAM(s) Oral three times a day with meals    MEDICATIONS  (PRN):  bisacodyl 5 milliGRAM(s) Oral every 12 hours PRN Constipation  dextrose Oral Gel 15 Gram(s) Oral once PRN Blood Glucose LESS THAN 70 milliGRAM(s)/deciliter  sodium chloride 0.9% lock flush 10 milliLiter(s) IV Push every 1 hour PRN Pre/post blood products, medications, blood draw, and to maintain line patency      Allergies    nitrofurantoin (Nephrotoxicity)    Intolerances    FAMILY HISTORY:  Family history of heart disease  father    Family history of CVA  mother      unless noted, no significant family hx with Mother, Father, Siblings    Vital Signs Last 24 Hrs  T(C): 36.3 (11 May 2022 11:30), Max: 36.6 (11 May 2022 04:50)  T(F): 97.3 (11 May 2022 11:30), Max: 97.9 (11 May 2022 04:50)  HR: 74 (11 May 2022 11:30) (70 - 96)  BP: 145/72 (11 May 2022 11:30) (132/65 - 163/77)  BP(mean): --  RR: 18 (11 May 2022 11:30) (17 - 18)  SpO2: 94% (11 May 2022 11:30) (94% - 99%)    General: WN/WD NAD  Neurology: Awake, nonfocal, ROA x 4  Neck: Neck supple, trachea midline, + palpable carotid pulses b/l  Respiratory: non labored breath sounds on RA  Psych: Oriented x 3, normal affect      LABS:                        9.0    7.84  )-----------( 269      ( 10 May 2022 15:00 )             31.1     05-10    x   |  x   |  14  ----------------------------<  x   x    |  x   |  x     Ca    8.9      10 May 2022 15:00  Phos  4.4     05-10            RADIOLOGY & ADDITIONAL STUDIES: HPI:  67M PMH of CAD, NSTEMI s/p 3 stents in 2014 (stents to LAD, proximal and distal LCx), ischemic cardiomyopathy, HTN for 10 years, T2DM for 26 years c/b peripheral neuropathy, CVA, TIA, Afib on Pradaxa, chronic RLE wound, L carotid stenosis s/p endarterectomy (2014) just recently admitted  to the [facility] on 2/19/2022 with acute onset chest pain for one day found to have an NSTEMI, CAD, s/p PCI in setting of increased AF rates off bb for 3 days and resolved chest pain, his CKMB peaked and Echo noted with EF 60%,normal LV function, mild TR, mild [place]. He was s/p LHC with 85% proximal LAD s/p balloon angioplasty and LULU. He presented to [facility] ED with decreased urine output over the week. Carotid duplex showed Calcified atherosclerotic disease seen throughout the carotid systems. There is new occlusion of the left internal carotid artery. There is greater than 70% stenosis involving the distal right common carotid artery as was seen previously. Mild to moderate flow-limiting stenosis is seen at the left external carotid artery. Vascular surgery consulted for carotid stenosis. Pt currently denies vision changes, dizziness, lightheadedness or any other symptoms at this point in time.       PAST MEDICAL & SURGICAL HISTORY:  HTN (hypertension), benign      HLD (hyperlipidemia)      DM type 2 (diabetes mellitus, type 2)      TIA (transient ischemic attack)      Atrial fibrillation      MI (myocardial infarction)  circa 2014 and 2022.      CAD (coronary artery disease)      Neuropathy      Stage 3 chronic kidney disease      2019 novel coronavirus disease (COVID-19)  12/2021.  Received the COVID-19 vaccine x 2 as of April 2022.      Status post angioplasty with stent  LULU x 3 2/7/2014      S/P carotid endarterectomy  left          REVIEW OF SYSTEMS    10 point review of systems was assessed and is unremarkable other than what was mentioned in the HPI    MEDICATIONS  (STANDING):  allopurinol 100 milliGRAM(s) Oral daily  apixaban 5 milliGRAM(s) Oral two times a day  aspirin enteric coated 81 milliGRAM(s) Oral daily  atorvastatin 40 milliGRAM(s) Oral at bedtime  chlorhexidine 4% Liquid 1 Application(s) Topical <User Schedule>  dextrose 50% Injectable 25 Gram(s) IV Push once  dextrose 50% Injectable 25 Gram(s) IV Push once  dextrose 50% Injectable 12.5 Gram(s) IV Push once  diltiazem    milliGRAM(s) Oral daily  hydrALAZINE 25 milliGRAM(s) Oral three times a day  insulin lispro (ADMELOG) corrective regimen sliding scale   SubCutaneous at bedtime  insulin lispro (ADMELOG) corrective regimen sliding scale   SubCutaneous three times a day before meals  lidocaine   4% Patch 1 Patch Transdermal daily  metoprolol succinate  milliGRAM(s) Oral daily  mupirocin 2% Ointment 1 Application(s) Both Nostrils two times a day  polyethylene glycol 3350 17 Gram(s) Oral two times a day  povidone iodine 10% Solution 1 Application(s) Topical daily  QUEtiapine 25 milliGRAM(s) Oral at bedtime  senna 2 Tablet(s) Oral at bedtime  sevelamer carbonate 1600 milliGRAM(s) Oral three times a day with meals    MEDICATIONS  (PRN):  bisacodyl 5 milliGRAM(s) Oral every 12 hours PRN Constipation  dextrose Oral Gel 15 Gram(s) Oral once PRN Blood Glucose LESS THAN 70 milliGRAM(s)/deciliter  sodium chloride 0.9% lock flush 10 milliLiter(s) IV Push every 1 hour PRN Pre/post blood products, medications, blood draw, and to maintain line patency      Allergies    nitrofurantoin (Nephrotoxicity)    Intolerances    FAMILY HISTORY:  Family history of heart disease  father    Family history of CVA  mother      unless noted, no significant family hx with Mother, Father, Siblings    Vital Signs Last 24 Hrs  T(C): 36.3 (11 May 2022 11:30), Max: 36.6 (11 May 2022 04:50)  T(F): 97.3 (11 May 2022 11:30), Max: 97.9 (11 May 2022 04:50)  HR: 74 (11 May 2022 11:30) (70 - 96)  BP: 145/72 (11 May 2022 11:30) (132/65 - 163/77)  BP(mean): --  RR: 18 (11 May 2022 11:30) (17 - 18)  SpO2: 94% (11 May 2022 11:30) (94% - 99%)    General: WN/WD NAD  Neurology: Awake, nonfocal, ROA x 4  Neck: Neck supple, trachea midline, + palpable carotid pulses b/l  Respiratory: non labored breath sounds on RA  Psych: Oriented x 3, normal affect      LABS:                        9.0    7.84  )-----------( 269      ( 10 May 2022 15:00 )             31.1     05-10    x   |  x   |  14  ----------------------------<  x   x    |  x   |  x     Ca    8.9      10 May 2022 15:00  Phos  4.4     05-10        carotid duplex reviewed

## 2022-05-11 NOTE — PROGRESS NOTE ADULT - SUBJECTIVE AND OBJECTIVE BOX
Admitting Diagnosis:  Chronic kidney disease [N18.9]  CHRONIC KIDNEY DISEASE, UNSPECIFIED    Background:  This is a 67 year old gentleman pmhx CAD s/p MI/PCI/CABG, hx TIA, afib on rivaroxaban, HTN, DM2, PVD, gout, L CEA 2014, and CKD who presented to [facility] 4/11/22 with ANT, tremors, confusion and lethargy.  CT head no acute changes.  S/p initiation of hemodialysis.  Mental status has improved dramatically.  Pt denies any headaches, no slurred speech, no hemiparesis.  He has chronic gout with bilateral finger swelling, pain.  He also has chronic neuropathy.  Both legs are weak.      Interval Hx:  persistent encephalopathy prompting re-consultation.    In addition, concern over history of carotid stenosis.      ******    Past Medical History:  HTN (hypertension), benign [401.1]  HLD (hyperlipidemia) [272.4]  DM type 2 (diabetes mellitus, type 2) [250.00]  TIA (transient ischemic attack) [435.9]  Atrial fibrillation [427.31]  MI (myocardial infarction) [410.90]  circa 2014 and 2022.  CAD (coronary artery disease) [414.00]  Neuropathy [G62.9]  Stage 3 chronic kidney disease [N18.30]  2019 novel coronavirus disease (COVID-19) [U07.1]  12/2021.  Received the COVID-19 vaccine x 2 as of April 2022.    Past Surgical History:  Status post angioplasty with stent [V45.82]  LULU x 3 2/7/2014  S/P carotid endarterectomy [Z98.89]  left  S/P CABG (coronary artery bypass graft) [Z95.1]        Social History:  No toxic habits    Family History:  FAMILY HISTORY:  Family history of heart disease          ROS: as per HPI.      Medications:  allopurinol 100 milliGRAM(s) Oral daily  apixaban 5 milliGRAM(s) Oral two times a day  aspirin enteric coated 81 milliGRAM(s) Oral daily  atorvastatin 40 milliGRAM(s) Oral at bedtime  bisacodyl 5 milliGRAM(s) Oral every 12 hours PRN  chlorhexidine 4% Liquid 1 Application(s) Topical <User Schedule>  dextrose 50% Injectable 25 Gram(s) IV Push once  dextrose 50% Injectable 25 Gram(s) IV Push once  dextrose 50% Injectable 12.5 Gram(s) IV Push once  dextrose Oral Gel 15 Gram(s) Oral once PRN  diltiazem    milliGRAM(s) Oral daily  hydrALAZINE 25 milliGRAM(s) Oral three times a day  insulin lispro (ADMELOG) corrective regimen sliding scale   SubCutaneous at bedtime  insulin lispro (ADMELOG) corrective regimen sliding scale   SubCutaneous three times a day before meals  lidocaine   4% Patch 1 Patch Transdermal daily  metoprolol succinate  milliGRAM(s) Oral daily  polyethylene glycol 3350 17 Gram(s) Oral two times a day  povidone iodine 10% Solution 1 Application(s) Topical daily  QUEtiapine 25 milliGRAM(s) Oral at bedtime  senna 2 Tablet(s) Oral at bedtime  sevelamer carbonate 1600 milliGRAM(s) Oral three times a day with meals  sodium chloride 0.9% lock flush 10 milliLiter(s) IV Push every 1 hour PRN      Labs:  CBC Full  -  ( 10 May 2022 15:00 )  WBC Count : 7.84 K/uL  RBC Count : 3.84 M/uL  Hemoglobin : 9.0 g/dL  Hematocrit : 31.1 %  Platelet Count - Automated : 269 K/uL  Mean Cell Volume : 81.0 fl  Mean Cell Hemoglobin : 23.4 pg  Mean Cell Hemoglobin Concentration : 28.9 gm/dL  Auto Neutrophil # : 5.61 K/uL  Auto Lymphocyte # : 1.29 K/uL  Auto Monocyte # : 0.71 K/uL  Auto Eosinophil # : 0.14 K/uL  Auto Basophil # : 0.04 K/uL  Auto Neutrophil % : 71.5 %  Auto Lymphocyte % : 16.5 %  Auto Monocyte % : 9.1 %  Auto Eosinophil % : 1.8 %  Auto Basophil % : 0.5 %    05-10    x   |  x   |  14  ----------------------------<  x   x    |  x   |  x     Ca    8.9      10 May 2022 15:00  Phos  4.4     05-10      CAPILLARY BLOOD GLUCOSE      POCT Blood Glucose.: 180 mg/dL (11 May 2022 08:04)  POCT Blood Glucose.: 200 mg/dL (10 May 2022 21:30)  POCT Blood Glucose.: 134 mg/dL (10 May 2022 18:15)  POCT Blood Glucose.: 273 mg/dL (10 May 2022 12:01)              Vitals:  Vital Signs Last 24 Hrs  T(C): 36.6 (11 May 2022 04:50), Max: 36.6 (11 May 2022 04:50)  T(F): 97.9 (11 May 2022 04:50), Max: 97.9 (11 May 2022 04:50)  HR: 94 (11 May 2022 04:50) (70 - 96)  BP: 146/66 (11 May 2022 04:50) (132/65 - 163/77)  BP(mean): --  RR: 18 (11 May 2022 04:50) (17 - 18)  SpO2: 97% (11 May 2022 04:50) (97% - 99%)  NEUROLOGICAL EXAM:    Mental status: Awake, alert, and in no apparent distress. Oriented to person, got upset when asked him where he was, asked if in hospital, he says where do you think I am, year 2021.  Language intact.  Attention impaired.      Cranial Nerves: Pupils were equal, round, reactive to light. Extraocular movements were intact. B BTT Facial sensation was intact to light touch. There was no facial asymmetry. The palate was upgoing symmetrically and tongue was midline. Hearing acuity was intact to finger rub AU. Shoulder shrug was full bilaterally    Motor exam: Bulk and tone were normal. No downward drift in arms.  Right leg minimally antigravity, left leg plane of bed and bends knee but heel not coming up off the bed.  Fine finger movements were symmetric. There was bilateral symmetric pronator drift    Reflexes: DTRs depressed, flexor plantars.     Sensation: Intact to noxious, seems intact to light touch, due to decreased attention did not assess other modalities     Coordination: no gross dysmetria    Gait: deferred    Imaging:    ACC: [id number] EXAM:  CT BRAIN                        PROCEDURE DATE:  04/09/2022      INTERPRETATION:  CLINICAL INFORMATION:  Altered mental status, on   anticoagulation .    TECHNIQUE: Multiple contiguous axial images were acquired from the   skullbase to the vertex without the administration of intravenous   contrast.  Coronal and sagittal reformations were made.    COMPARISON: CT head 10/21/2021, brain MRI 02/23/2014.    FINDINGS:    Scattered lacunar infarcts in the bilateral cerebralhemispheres are   grossly stable compared to the 10/21/2021 head CT. No new additional   infarcts are noted over the time interval.    No acute intracranial hemorrhage, mass effect, or midline shift. The   brain demonstrates periventricular hypoattenuation, nonspecific in   etiology but likely representing chronic microvascular ischemic changes.    No abnormal extra-axial fluid collections are present.    The ventricles and sulci demonstrate age appropriate involutional   changes.  The basal cisterns are patent.    The orbits are unremarkable. The paranasal sinuses are clear. The mastoid   air cells are clear. The calvarium is intact.    IMPRESSION:    No acute intracranial abnormality is noted. If the patient has new and   persistent symptoms, consider short interval follow-up head CT or brain   MRI.    --- End of Report ---    GATITO VILLARREAL MD; Resident Radiologist  This document has been electronically signed.  JESSE CORONA MD; Attending Radiologist  This document has been electronically signed. Apr 9 2022  2:00PM

## 2022-05-11 NOTE — PROGRESS NOTE ADULT - ASSESSMENT
Impression:  67 year old gentleman pmhx CAD s/p MI/PCI/CABG, hx TIA, afib on pradaxa, HTN, DM2, PVD, gout, L CEA 2014, CKD admitted to ED with ANT, tremors, confusion and lethargy. Receiving new hemodialysis. CT head no acute changes. Mental status improved today. Pt denies any headaches, no slurred speech, no focal facial or limb weakness. Has chronic gout with bilateral finger swelling, pain.     Diagnosis:  Presentation consistent with toxic metabolic encephalopathy secondary to ANT/CKD, CT head no acute changes, no focal findings on exam, no evidence of acute neurological event at this time.   That said, his persistent encephalopathy with leg weakness is concerning.      Recommendations:    MRI brain to rule out structural lesion given his persistent altered mentation    Given history of carotid stenosis will check a carotid ultrasound.  Per Dr. Navas can consider a CTA head and neck which would be more sensitive and specific.  Usually, carotid dopplers provide enough information, but if the CTA head and neck can be done without risk, then it can be considered.  Carotid stenosis is more a risk factor for recurrent stroke than AMS.      Rechecking TSH along with a free T4    Ordered a B12 for completeness     Continue HD as per renal    Continue to manage BUN to minimize uremic encephalopathy; currently well managed but effects can sometimes persist    Toxic/metabolic/infectious workup as per primary team     Continue stroke prophylaxis- on AC for afib; no no neurological indication for concomitant antiplatelet unless there is non-neurological indication for its continued use; optimize hypertension/glycemic control    consider workup for MOR, can potentially be done inpatient vs outpatient, he is generally more confused in the mornings which can sometimes be seen in setting of sleep apnea  pt going to vascular lab when the neuro exam was completed.

## 2022-05-11 NOTE — PROGRESS NOTE ADULT - SUBJECTIVE AND OBJECTIVE BOX
[facility]   [phone]  RENAL FOLLOW UP NOTE  --------------------------------------------------------------------------------  Chief Complaint:    24 hour events/subjective:    seen earlier   still with AMS  but answering questions     PAST HISTORY  --------------------------------------------------------------------------------  No significant changes to PMH, PSH, FHx, SHx, unless otherwise noted    ALLERGIES & MEDICATIONS  --------------------------------------------------------------------------------  Allergies    nitrofurantoin (Nephrotoxicity)    Intolerances      Standing Inpatient Medications  allopurinol 100 milliGRAM(s) Oral daily  apixaban 5 milliGRAM(s) Oral two times a day  aspirin enteric coated 81 milliGRAM(s) Oral daily  atorvastatin 40 milliGRAM(s) Oral at bedtime  chlorhexidine 4% Liquid 1 Application(s) Topical <User Schedule>  dextrose 50% Injectable 25 Gram(s) IV Push once  dextrose 50% Injectable 25 Gram(s) IV Push once  dextrose 50% Injectable 12.5 Gram(s) IV Push once  diltiazem    milliGRAM(s) Oral daily  hydrALAZINE 25 milliGRAM(s) Oral three times a day  insulin lispro (ADMELOG) corrective regimen sliding scale   SubCutaneous at bedtime  insulin lispro (ADMELOG) corrective regimen sliding scale   SubCutaneous three times a day before meals  lidocaine   4% Patch 1 Patch Transdermal daily  metoprolol succinate  milliGRAM(s) Oral daily  mupirocin 2% Ointment 1 Application(s) Both Nostrils two times a day  polyethylene glycol 3350 17 Gram(s) Oral two times a day  povidone iodine 10% Solution 1 Application(s) Topical daily  QUEtiapine 25 milliGRAM(s) Oral at bedtime  senna 2 Tablet(s) Oral at bedtime  sevelamer carbonate Powder 1600 milliGRAM(s) Oral three times a day with meals    PRN Inpatient Medications  bisacodyl 5 milliGRAM(s) Oral every 12 hours PRN  dextrose Oral Gel 15 Gram(s) Oral once PRN  sodium chloride 0.9% lock flush 10 milliLiter(s) IV Push every 1 hour PRN      REVIEW OF SYSTEMS  --------------------------------------------------------------------------------    Gen: denies fevers/chills,  CVS: denies chest pain/palpitations  Resp: denies SOB/Cough  GI: Denies N/V/Abd pain  : Denies dysuria       VITALS/PHYSICAL EXAM  --------------------------------------------------------------------------------  T(C): 36.3 (05-11-22 @ 11:30), Max: 36.6 (05-11-22 @ 04:50)  HR: 74 (05-11-22 @ 11:30) (74 - 94)  BP: 145/72 (05-11-22 @ 11:30) (145/72 - 163/77)  RR: 18 (05-11-22 @ 11:30) (18 - 18)  SpO2: 94% (05-11-22 @ 11:30) (94% - 97%)  Wt(kg): --        05-10-22 @ 07:01  -  05-11-22 @ 07:00  --------------------------------------------------------  IN: 150 mL / OUT: 500 mL / NET: -350 mL    05-11-22 @ 07:01  -  05-11-22 @ 19:05  --------------------------------------------------------  IN: 580 mL / OUT: 0 mL / NET: 580 mL      Physical Exam:  		              Gen: awake, oriented 2-3 t, appears comfortable  	Pulm: Decreased breath sounds b/l bases.  	CV: No JVD. IRR,   	Abd: +BS, soft, nontender, softly distended, obese               Extremity no edema              Extremity LE: + hyperpigmented bilateral LE with no edema              Vascular: R IJ HD catheter, L arm AVF       LABS/STUDIES  --------------------------------------------------------------------------------              9.0    7.84  >-----------<  269      [05-10-22 @ 15:00]              31.1     x   |  x   |  14  ----------------------------<  x       [05-10-22 @ 17:54]  x    |  x   |  x         Ca     8.9     [05-10-22 @ 15:00]      Phos  4.4     [05-10-22 @ 15:00]            Creatinine Trend:  SCr 8.68 [05-10 @ 15:00]  SCr 5.39 [05-08 @ 10:30]  SCr 6.40 [05-07 @ 06:42]  SCr 4.44 [05-06 @ 07:57]  SCr 7.41 [05-06 @ 00:06]                  Iron 16, TIBC 239, %sat 7      [04-15-22 @ 10:16]  Ferritin 172      [04-15-22 @ 09:05]  PTH -- (Ca 8.3)      [04-15-22 @ 12:18]   150  PTH -- (Ca --)      [04-03-22 @ 23:06]   97  HbA1c 11.7      [11-07-19 @ 09:14]  TSH 1.98      [04-05-22 @ 05:19]    anti-GBM 3      [04-11-22 @ 19:27]
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
## 2022-05-11 NOTE — PROGRESS NOTE ADULT - PROBLEM SELECTOR PLAN 5
-ABG with no CO2 retention, likely 2nd to Lyrica?  -Mental status now overall improved, intermittent confusion  -Neuro recs noted, concern for MOR. Pt with no CO2 retention on ABGs, no episodes of desaturation while sleeping. Can pursue outpatient PSG.

## 2022-05-11 NOTE — PROGRESS NOTE ADULT - SUBJECTIVE AND OBJECTIVE BOX
Follow-up Pulm Progress Note    No new respiratory events overnight.  Denies SOB/CP.     Medications:  MEDICATIONS  (STANDING):  allopurinol 100 milliGRAM(s) Oral daily  apixaban 5 milliGRAM(s) Oral two times a day  aspirin enteric coated 81 milliGRAM(s) Oral daily  atorvastatin 40 milliGRAM(s) Oral at bedtime  chlorhexidine 4% Liquid 1 Application(s) Topical <User Schedule>  dextrose 50% Injectable 25 Gram(s) IV Push once  dextrose 50% Injectable 12.5 Gram(s) IV Push once  dextrose 50% Injectable 25 Gram(s) IV Push once  diltiazem    milliGRAM(s) Oral daily  hydrALAZINE 25 milliGRAM(s) Oral three times a day  insulin lispro (ADMELOG) corrective regimen sliding scale   SubCutaneous at bedtime  insulin lispro (ADMELOG) corrective regimen sliding scale   SubCutaneous three times a day before meals  lidocaine   4% Patch 1 Patch Transdermal daily  metoprolol succinate  milliGRAM(s) Oral daily  mupirocin 2% Ointment 1 Application(s) Both Nostrils two times a day  polyethylene glycol 3350 17 Gram(s) Oral two times a day  povidone iodine 10% Solution 1 Application(s) Topical daily  QUEtiapine 25 milliGRAM(s) Oral at bedtime  senna 2 Tablet(s) Oral at bedtime  sevelamer carbonate 1600 milliGRAM(s) Oral three times a day with meals    MEDICATIONS  (PRN):  bisacodyl 5 milliGRAM(s) Oral every 12 hours PRN Constipation  dextrose Oral Gel 15 Gram(s) Oral once PRN Blood Glucose LESS THAN 70 milliGRAM(s)/deciliter  sodium chloride 0.9% lock flush 10 milliLiter(s) IV Push every 1 hour PRN Pre/post blood products, medications, blood draw, and to maintain line patency          Vital Signs Last 24 Hrs  T(C): 36.3 (11 May 2022 11:30), Max: 36.6 (11 May 2022 04:50)  T(F): 97.3 (11 May 2022 11:30), Max: 97.9 (11 May 2022 04:50)  HR: 74 (11 May 2022 11:30) (70 - 96)  BP: 145/72 (11 May 2022 11:30) (132/65 - 163/77)  BP(mean): --  RR: 18 (11 May 2022 11:30) (17 - 18)  SpO2: 94% (11 May 2022 11:30) (94% - 99%)          05-10 @ 07:01  -  05-11 @ 07:00  --------------------------------------------------------  IN: 150 mL / OUT: 500 mL / NET: -350 mL          LABS:                        9.0    7.84  )-----------( 269      ( 10 May 2022 15:00 )             31.1     05-10    x   |  x   |  14  ----------------------------<  x   x    |  x   |  x     Ca    8.9      10 May 2022 15:00  Phos  4.4     05-10            CAPILLARY BLOOD GLUCOSE      POCT Blood Glucose.: 186 mg/dL (11 May 2022 12:04)                  Physical Examination:  PULM: Clear to auscultation bilaterally, no significant sputum production  CVS: S1, S2 heard    RADIOLOGY REVIEWED  CXR 4/29: small L pl effusion/atelectasis     CT chest: < from: CT Chest No Cont (04.04.22 @ 16:52) >  FINDINGS:    AIRWAYS, LUNGS, PLEURA: Tracheal secretions. Small left greater than   right pleural effusions increased compared to 2/19/2022. Right-sided   pleural thickening. Lingular and left greater than right basilar   opacification, likely atelectasis.    MEDIASTINUM: Cardiomegaly. No pericardial effusion. Thoracic aorta normal   caliber.  No large mediastinal lymph nodes.    IMAGED ABDOMEN: Nonspecific perinephric stranding.    SOFT TISSUES: Unremarkable.    BONES: Unremarkable.    IMPRESSION:.    Small left greater than right bilateral pleural effusions increased in   size compared to 2/19/2022.    Lingular and left greater than right basilar opacification, likely   atelectasis.    --- End of Report ---    < end of copied text >

## 2022-05-11 NOTE — PROGRESS NOTE ADULT - ASSESSMENT
A/P    66 y/o M with history of CAD, s/p multiple PCI, with most recent 2/22 PCI of LAD in setting of NSTEMI, on ASA only (pradaxa), chronic atrial fibrillation maintained on Pradaxa, essential HTN, type 2 DM previously on oral medications but on insulin, diabetic neuropathy with chronic RIGHT LE venous stasis changes>left, LEFT foot ulcer seen by podiatry, past endarterectomy LEFT CEA in 2014 presenting with 1 week of decreased urine output, cystitis with hematuria     #ANT on CKD, new HD  -oliguric renal failure in setting of UTI/cystitis  -etiology ??  -New HD for uremia, renal failure  -sp rrt 4/12 for uncontrolled bleeding sp  right groin shiley removal  - monitor h/h - stable  -s/p L AVG 4/18  -s/p permacath placement 4/20  -renal f/ u    #AMS/Lethargy  -recent ams from pain meds  -AMS sp RRT 4/27  likely secondary to pain med   -repeat CT 4/27 unremarkable --  discontinued Percocet- improved   -neuro following - pending MRI     #Acute on chronic HFpEF  -stable  -continue volume removal with HD  -c/w  bb    #Chronic AF  -rates stable   -c/w metoprolol succ 100 mg qd  -c/w dilt cd 120mg daily    -brief pause noted- likely secondary to MOR  -c/w eliquis 5 mg BID for now - heme stable     #HTN  -stable  -c/w meds       #CAD, s/p PCI, most recent 2/2022  -stable, cont asa  -off plavix due to a/c indication  -statin     #carotid stenosis   -cont med tx  -previous MRA  noted with Greater than 75% stenosis of the right distal common carotid artery. Approximately 60% stenosis of the proximal left internal carotid artery.  -carotid dopplers 5/11 noted :  There is new occlusion of the left internal carotid artery;  greater than 70% stenosis involving the distal right common carotid artery as was seen previously. Mild to moderate flow-limiting stenosis is seen at the left external   carotid artery.  -Continue ASA and Statin Therapy  -neuro fu         none

## 2022-05-11 NOTE — PROGRESS NOTE ADULT - SUBJECTIVE AND OBJECTIVE BOX
CARDIOLOGY FOLLOW UP - Dr. Mazariegos  DATE OF SERVICE: 5/11/22     CC no acute events       REVIEW OF SYSTEMS:  CONSTITUTIONAL: No fever, weight loss, or fatigue  RESPIRATORY: No cough, wheezing, chills or hemoptysis; No Shortness of Breath  CARDIOVASCULAR: No chest pain, palpitations, passing out, dizziness, or leg swelling  GASTROINTESTINAL: No abdominal or epigastric pain. No nausea, vomiting, or hematemesis; No diarrhea or constipation. No melena or hematochezia.  VASCULAR: No edema     PHYSICAL EXAM:  T(C): 36.3 (05-11-22 @ 11:30), Max: 36.6 (05-11-22 @ 04:50)  HR: 74 (05-11-22 @ 11:30) (70 - 96)  BP: 145/72 (05-11-22 @ 11:30) (132/65 - 163/77)  RR: 18 (05-11-22 @ 11:30) (17 - 18)  SpO2: 94% (05-11-22 @ 11:30) (94% - 99%)  Wt(kg): --  I&O's Summary    10 May 2022 07:01  -  11 May 2022 07:00  --------------------------------------------------------  IN: 150 mL / OUT: 500 mL / NET: -350 mL    11 May 2022 07:01  -  11 May 2022 12:01  --------------------------------------------------------  IN: 100 mL / OUT: 0 mL / NET: 100 mL        Appearance: Normal	  Cardiovascular: Normal S1 S2,RRR, No JVD, No murmurs  Respiratory: Lungs clear to auscultation	  Gastrointestinal:  Soft, Non-tender, + BS	  Extremities: Normal range of motion, No clubbing, cyanosis or edema      Home Medications:  allopurinol 100 mg oral tablet: 1 tab(s) orally once a day (03 Apr 2022 06:34)  aspirin 81 mg oral delayed release tablet: 1 tab(s) orally once a day (03 Apr 2022 06:34)  bumetanide 2 mg oral tablet: 1 tab(s) orally once a day (03 Apr 2022 06:34)  insulin glargine 100 units/mL subcutaneous solution: 25 unit(s) subcutaneous once a day (at bedtime) (03 Apr 2022 06:34)  metOLazone 5 mg oral tablet: 1 tab(s) orally 3 times a week (03 Apr 2022 06:34)  metoprolol succinate 50 mg oral tablet, extended release: 2 tab(s) orally once a day (03 Apr 2022 06:34)  NovoLOG 100 units/mL subcutaneous solution: subcutaneous 4 times a day (before meals and at bedtime) (03 Apr 2022 06:34)  NovoLOG FlexPen 100 units/mL injectable solution: 10 unit(s) subcutaneous 3 times a day (03 Apr 2022 06:34)  oxycodone-acetaminophen 5 mg-325 mg oral tablet: 1 tab(s) orally 2 times a day (03 Apr 2022 06:34)  Pradaxa 150 mg oral capsule: 1 cap(s) orally 2 times a day (03 Apr 2022 06:34)  pregabalin 100 mg oral capsule: 1 cap(s) orally 3 times a day (03 Apr 2022 06:34)      MEDICATIONS  (STANDING):  allopurinol 100 milliGRAM(s) Oral daily  apixaban 5 milliGRAM(s) Oral two times a day  aspirin enteric coated 81 milliGRAM(s) Oral daily  atorvastatin 40 milliGRAM(s) Oral at bedtime  chlorhexidine 4% Liquid 1 Application(s) Topical <User Schedule>  dextrose 50% Injectable 25 Gram(s) IV Push once  dextrose 50% Injectable 25 Gram(s) IV Push once  dextrose 50% Injectable 12.5 Gram(s) IV Push once  diltiazem    milliGRAM(s) Oral daily  hydrALAZINE 25 milliGRAM(s) Oral three times a day  insulin lispro (ADMELOG) corrective regimen sliding scale   SubCutaneous at bedtime  insulin lispro (ADMELOG) corrective regimen sliding scale   SubCutaneous three times a day before meals  lidocaine   4% Patch 1 Patch Transdermal daily  metoprolol succinate  milliGRAM(s) Oral daily  polyethylene glycol 3350 17 Gram(s) Oral two times a day  povidone iodine 10% Solution 1 Application(s) Topical daily  QUEtiapine 25 milliGRAM(s) Oral at bedtime  senna 2 Tablet(s) Oral at bedtime  sevelamer carbonate 1600 milliGRAM(s) Oral three times a day with meals      TELEMETRY: afibhr 70  brief pause noted approx 2.43	    ECG:  	  RADIOLOGY:   < from: VA Duplex Carotid, Bilat (05.11.22 @ 10:27) >  IMPRESSION:    Calcified atherosclerotic disease seen throughout the carotid systems.    There is new occlusion of the left internal carotid artery.    There is greater than 70% stenosis involving the distal right common   carotid artery as was seen previously.    Mild to moderate flow-limiting stenosis is seen at the left external   carotid artery.    SUSAN Hopkins was informed of the above findings 5/11/2022 at10:30 AM.    Measurement of carotid stenosis is based on velocity parameters that   correlate the residual internal carotid diameter with that of the more   distal vessel in accordance with a method such as the [employer] American   Symptomatic Carotid Endarterectomy Trial (NASCET).    --- End of Report ---    < end of copied text >    DIAGNOSTIC TESTING:  [ ] Echocardiogram:  [ ]  Catheterization:  [ ] Stress Test:    OTHER: 	    LABS:	 	                            9.0    7.84  )-----------( 269      ( 10 May 2022 15:00 )             31.1     05-10    x   |  x   |  14  ----------------------------<  x   x    |  x   |  x     Ca    8.9      10 May 2022 15:00  Phos  4.4     05-10

## 2022-05-11 NOTE — CONSULT NOTE ADULT - ASSESSMENT
67M PMH of CAD, NSTEMI s/p 3 stents in 2014 (stents to LAD, proximal and distal LCx), ischemic cardiomyopathy, HTN for 10 years, T2DM for 26 years c/b peripheral neuropathy, CVA, TIA, Afib on Pradaxa, chronic RLE wound, L carotid stenosis s/p endarterectomy (2014) just recently admitted  to the [facility] on 2/19/2022 with acute onset chest pain for one day found to have an NSTEMI, CAD, s/p PCI in setting of increased AF rates off bb for 3 days and resolved chest pain, his CKMB peaked and Echo noted with EF 60%,normal LV function, mild TR, mild TX. He was s/p LHC with 85% proximal LAD s/p balloon angioplasty and LULU. He presented to [facility] ED with decreased urine output over the week. Carotid duplex showed Calcified atherosclerotic disease seen throughout the carotid systems. There is new occlusion of the left internal carotid artery. There is greater than 70% stenosis involving the distal right common carotid artery as was seen previously. Mild to moderate flow-limiting stenosis is seen at the left external carotid artery. Vascular surgery consulted for carotid stenosis. Pt currently denies vision changes, dizziness, lightheadedness or any other symptoms at this point in time.     PLAN:  - Pt was discussed with Dr. Pinto  - Recommend CTA of neck  - Will follow as consulting service    Aurelio Gentile, PGY-2  Vascular Surgery  [id number]

## 2022-05-11 NOTE — CONSULT NOTE ADULT - ATTENDING COMMENTS
Patient seen and examined.  Agree with resident note as above.  Patient with hx as noted including fib on AC, NSTEMI s/p stents (most recent 2/2022) who presented 4/2 with altered mentation/oliguria/dyspnea on exertion.  Patient was found to have ANT on CKD and has been initiated on HD.  Tonight, MICU is consulted for worsening somnolence.  On exam, patient will attend to voice but not answer questions or follow commands reliably.  Is protecting his airway.  Vitals stable.    ABG checked, patient has adequate ventilation.  Patient at risk for hypoventilation given habitus and sedation - please encourage upright position, incentive spirometry when awake, OOB when awake.    Med review done - patient appears to be on multiple sedating meds in the setting of a GFR approximating 0.  Please d/c lyrica, d/c percocet.  Dose remaining meds per HD.    No need for MICU at this time.  Anticipate patient's mentation will improve off of sedatives.  Discussed with primary team and wife at bedside.  Wife states daughter is surrogate decision maker.  FULL CODE.
direct manual pressure to shiley access site
I Edward Pinto MD performed a history and physical exam of the patient and discussed  the findings and plan with the house officer. I reviewed the resident note and agree with the findings and plan

## 2022-05-11 NOTE — PROGRESS NOTE ADULT - ASSESSMENT
·  Problem:[email or web address] hemodialysis per renal  to cont as per renal     Problem/Plan - 2:  ·  Problem: Chronic atrial fibrillation. c/w a/c  cards f/u     Problem/Plan - 3:  ·  Problem: Cystitis.   ID follow up appreciated        Problem/Plan - 4:  ·  Problem: Type 2 diabetes mellitus. c/w insulin   endo f/u      Problem/Plan - 5:  ·  Problem: CAD (coronary artery disease).   rapid a fib  meds adjusted  hr stable  c/w a/c       Persistent encephalopathy   neuro follow up appreciated  MRI ordered  carotid duplex ordered  continue to follow recommendations

## 2022-05-11 NOTE — PROGRESS NOTE ADULT - ASSESSMENT
67 year old gentleman with PMH of CAD, NSTEMI s/p 3 stents in 2014 (stents to LAD, proximal and distal LCx), ischemic cardiomyopathy, HTN for 10 years, T2DM for 26 years c/b peripheral neuropathy, CVA, TIA, Afib on Pradaxa, chronic RLE wound, L carotid stenosis s/p endarterectomy (2014) just recently admitted  to the [facility] on 2/19/2022 with acute onset chest pain for one day found to have an NSTEMI, CAD, s/p PCI in setting of increased AF rates off bb for 3 days and resolved chest pain, his CKMB peaked and Echo noted with EF 60%,normal LV function, mild TR, mild ID. He was s/p [facility] with 85% proximal LAD s/p balloon angioplasty and LULU. He presented to [facility] ED with decreased urine output over the week. [facility]. Stevens went to [facility] for hematuria and was started  on Nitrofurantoin. Pt is still taking Nitrofurantoin w/ 5 days left. He came to the ED due to worsening symptoms. Pt reports having a similar incident 4-5 years ago that resolved spontaneously. He reports increased leg edema Dyspnea worse on exertion. his baseline Serum creatinine is in the 2.0 to 2.3 mg/dl range. ANT with serum creatinine of 8.29 with bun 144 and k 5.5      1- ANT on ckd III ---> ESRD likely   2- chf chronic   3- hyperphosphatemia /shpt   4- anemia   5- hyperlipidemia   6- HTN /a fib  7- TIA hx   8- hx of carotid stenosis      HD  am   dialysis urr in range   renvela 2 tab with meals   anemia - epogen 04459 Units TIW with HD.  PT  seroquel 25 mg daily   hx of carotid stenosis for cta  ICA

## 2022-05-11 NOTE — PROGRESS NOTE ADULT - SUBJECTIVE AND OBJECTIVE BOX
DATE OF SERVICE: 05-11-22 @ 13:02  CHIEF COMPLAINT:Patient is a 67y old  Male who presents with a chief complaint of Decreased urine output for the past week, leg oedema (11 May 2022 12:34)    	        PAST MEDICAL & SURGICAL HISTORY:  HTN (hypertension), benign      HLD (hyperlipidemia)      DM type 2 (diabetes mellitus, type 2)      TIA (transient ischemic attack)      Atrial fibrillation      MI (myocardial infarction)  circa 2014 and 2022.      CAD (coronary artery disease)      Neuropathy      Stage 3 chronic kidney disease      2019 novel coronavirus disease (COVID-19)  12/2021.  Received the COVID-19 vaccine x 2 as of April 2022.      Status post angioplasty with stent  LULU x 3 2/7/2014      S/P carotid endarterectomy  left              feeling better  RESPIRATORY: No cough, wheezing, chills or hemoptysis; No Shortness of Breath  CARDIOVASCULAR: No chest pain, palpitations, passing out,   GASTROINTESTINAL: No abdominal or epigastric pain. No nausea, vomiting, or hematemesis;   GENITOURINARY: No dysuria, frequency, hematuria, or incontinence  NEUROLOGICAL: No headaches,     Medications:  MEDICATIONS  (STANDING):  allopurinol 100 milliGRAM(s) Oral daily  apixaban 5 milliGRAM(s) Oral two times a day  aspirin enteric coated 81 milliGRAM(s) Oral daily  atorvastatin 40 milliGRAM(s) Oral at bedtime  chlorhexidine 4% Liquid 1 Application(s) Topical <User Schedule>  dextrose 50% Injectable 25 Gram(s) IV Push once  dextrose 50% Injectable 25 Gram(s) IV Push once  dextrose 50% Injectable 12.5 Gram(s) IV Push once  diltiazem    milliGRAM(s) Oral daily  hydrALAZINE 25 milliGRAM(s) Oral three times a day  insulin lispro (ADMELOG) corrective regimen sliding scale   SubCutaneous at bedtime  insulin lispro (ADMELOG) corrective regimen sliding scale   SubCutaneous three times a day before meals  lidocaine   4% Patch 1 Patch Transdermal daily  metoprolol succinate  milliGRAM(s) Oral daily  mupirocin 2% Ointment 1 Application(s) Both Nostrils two times a day  polyethylene glycol 3350 17 Gram(s) Oral two times a day  povidone iodine 10% Solution 1 Application(s) Topical daily  QUEtiapine 25 milliGRAM(s) Oral at bedtime  senna 2 Tablet(s) Oral at bedtime  sevelamer carbonate 1600 milliGRAM(s) Oral three times a day with meals    MEDICATIONS  (PRN):  bisacodyl 5 milliGRAM(s) Oral every 12 hours PRN Constipation  dextrose Oral Gel 15 Gram(s) Oral once PRN Blood Glucose LESS THAN 70 milliGRAM(s)/deciliter  sodium chloride 0.9% lock flush 10 milliLiter(s) IV Push every 1 hour PRN Pre/post blood products, medications, blood draw, and to maintain line patency    	    PHYSICAL EXAM:  T(C): 36.3 (05-11-22 @ 11:30), Max: 36.6 (05-11-22 @ 04:50)  HR: 74 (05-11-22 @ 11:30) (70 - 96)  BP: 145/72 (05-11-22 @ 11:30) (132/65 - 163/77)  RR: 18 (05-11-22 @ 11:30) (17 - 18)  SpO2: 94% (05-11-22 @ 11:30) (94% - 99%)  Wt(kg): --  I&O's Summary    10 May 2022 07:01  -  11 May 2022 07:00  --------------------------------------------------------  IN: 150 mL / OUT: 500 mL / NET: -350 mL    11 May 2022 07:01  -  11 May 2022 13:02  --------------------------------------------------------  IN: 100 mL / OUT: 0 mL / NET: 100 mL        Appearance: Normal	  HEENT:   Normal oral mucosa, PERRL, EOMI	  Lymphatic: No lymphadenopathy  Cardiovascular: Normal S1 S2, No JVD, No murmurs, No edema  Respiratory: Lungs clear to auscultation	  Psychiatry: A & O   Gastrointestinal:  Soft, Non-tender, + BS	  	  Neurologic: Non-focal  Extremities: dec rom  pvd    TELEMETRY: 	    ECG:  	  RADIOLOGY:  OTHER: 	  	  LABS:	 	    CARDIAC MARKERS:                                9.0    7.84  )-----------( 269      ( 10 May 2022 15:00 )             31.1     05-10    x   |  x   |  14  ----------------------------<  x   x    |  x   |  x     Ca    8.9      10 May 2022 15:00  Phos  4.4     05-10      proBNP:   Lipid Profile:   HgA1c:   TSH:

## 2022-05-12 NOTE — PROGRESS NOTE ADULT - ASSESSMENT
Impression:  This is a 67 year old gentleman pmhx CAD s/p MI/PCI/CABG, hx TIA, afib on rivaroxaban, HTN, DM2, PVD, gout, L CEA 2014, and CKD who presented to [facility] 4/11/22 with ANT, tremors, confusion and lethargy.  CT head no acute changes.  S/p initiation of hemodialysis.  Mental status has improved dramatically.  Pt denies any headaches, no slurred speech, no hemiparesis.  He has chronic gout with bilateral finger swelling, pain.  He also has chronic neuropathy.  Both legs are weak.      Persistent encephalopathy prompted re-consultation on 5/10/22.  MRI brain was ordered and revealed small acute infarctions in bilateral posterior centrum semiovale and left corona radiata and left posterior periventricular white matter.  Carotid ultrasound was performed showing new occlusion of the left internal carotid artery, along with greater than 70% stenosis involving the distal right common carotid artery, and mild to moderate flow-limiting stenosis is seen at the left external carotid artery.  Vascular surgery has been consulted and is recommending CTA.  He continues to be encephalopathic and lethargic.      Diagnosis and Recommendations:  Small scattered infarcts  - doubt driving his encephalopathy but may well be contributing  - in setting of atrial fibrillation may well be cardioembolic, continue anticoagulation as per cardiology  - given left ICA occlusion and 70% stenosis of right common carotid appreciate vascular surgery consultation and agree with their recommendation for a CTA  - ECHO in February 2022 did not reveal severe cardiomyopathy, vegetation, or LV thrombus.  Consider repeating to confirm.  - HgA1c of 10.1 in February 2022 may be driving stroke risk.  Goal is < 7.  Will repeat   - Will check lipid panel.  Continue high-intensity statin therapy for now.    - PT/OT/SLP  - called emergency contact to relay these new findings but no pickup    Lethargy and encephalopathy  - likely multifactorial, cerebral infarcts may be contributing but doubt they are driving the clinical picture  - recheck TSH along with a free T4  - B12 for completeness   - Continue HD as per renal  - Continue to manage BUN to minimize uremic encephalopathy; currently well managed but effects can sometimes persist  - Toxic/metabolic/infectious workup as per primary team   - consider workup for MOR, can potentially be done inpatient vs outpatient, he is generally more confused in the mornings which can sometimes be seen in setting of sleep apnea

## 2022-05-12 NOTE — PROGRESS NOTE ADULT - SUBJECTIVE AND OBJECTIVE BOX
Follow-up Pulm Progress Note    No new respiratory events overnight.  Denies SOB/CP.     Medications:  MEDICATIONS  (STANDING):  allopurinol 100 milliGRAM(s) Oral daily  apixaban 5 milliGRAM(s) Oral two times a day  aspirin enteric coated 81 milliGRAM(s) Oral daily  atorvastatin 40 milliGRAM(s) Oral at bedtime  chlorhexidine 4% Liquid 1 Application(s) Topical <User Schedule>  dextrose 50% Injectable 25 Gram(s) IV Push once  dextrose 50% Injectable 25 Gram(s) IV Push once  dextrose 50% Injectable 12.5 Gram(s) IV Push once  diltiazem    milliGRAM(s) Oral daily  epoetin naz-epbx (RETACRIT) Injectable 60493 Unit(s) IV Push once  hydrALAZINE 25 milliGRAM(s) Oral three times a day  insulin lispro (ADMELOG) corrective regimen sliding scale   SubCutaneous at bedtime  insulin lispro (ADMELOG) corrective regimen sliding scale   SubCutaneous three times a day before meals  lidocaine   4% Patch 1 Patch Transdermal daily  metoprolol succinate  milliGRAM(s) Oral daily  mupirocin 2% Ointment 1 Application(s) Both Nostrils two times a day  polyethylene glycol 3350 17 Gram(s) Oral two times a day  povidone iodine 10% Solution 1 Application(s) Topical daily  QUEtiapine 25 milliGRAM(s) Oral at bedtime  senna 2 Tablet(s) Oral at bedtime  sevelamer carbonate Powder 1600 milliGRAM(s) Oral three times a day with meals    MEDICATIONS  (PRN):  bisacodyl 5 milliGRAM(s) Oral every 12 hours PRN Constipation  dextrose Oral Gel 15 Gram(s) Oral once PRN Blood Glucose LESS THAN 70 milliGRAM(s)/deciliter  sodium chloride 0.9% lock flush 10 milliLiter(s) IV Push every 1 hour PRN Pre/post blood products, medications, blood draw, and to maintain line patency          Vital Signs Last 24 Hrs  T(C): 36.4 (12 May 2022 06:48), Max: 36.5 (11 May 2022 20:07)  T(F): 97.5 (12 May 2022 06:48), Max: 97.7 (11 May 2022 20:07)  HR: 91 (12 May 2022 06:48) (91 - 92)  BP: 161/53 (12 May 2022 06:48) (148/58 - 161/53)  BP(mean): --  RR: 18 (12 May 2022 06:48) (18 - 18)  SpO2: 94% (12 May 2022 06:48) (94% - 96%)          05-11 @ 07:01  -  05-12 @ 07:00  --------------------------------------------------------  IN: 580 mL / OUT: 0 mL / NET: 580 mL          LABS:                        9.0    7.84  )-----------( 269      ( 10 May 2022 15:00 )             31.1     05-10    x   |  x   |  14  ----------------------------<  x   x    |  x   |  x     Ca    8.9      10 May 2022 15:00  Phos  4.4     05-10            CAPILLARY BLOOD GLUCOSE      POCT Blood Glucose.: 187 mg/dL (12 May 2022 08:19)          Physical Examination:  PULM: Clear to auscultation bilaterally, no significant sputum production  CVS: S1, S2 heard    RADIOLOGY REVIEWED  CXR 5/12: small L effusion, f/u official report     CT chest: < from: CT Chest No Cont (04.04.22 @ 16:52) >  FINDINGS:    AIRWAYS, LUNGS, PLEURA: Tracheal secretions. Small left greater than   right pleural effusions increased compared to 2/19/2022. Right-sided   pleural thickening. Lingular and left greater than right basilar   opacification, likely atelectasis.    MEDIASTINUM: Cardiomegaly. No pericardial effusion. Thoracic aorta normal   caliber.  No large mediastinal lymph nodes.    IMAGED ABDOMEN: Nonspecific perinephric stranding.    SOFT TISSUES: Unremarkable.    BONES: Unremarkable.    IMPRESSION:.    Small left greater than right bilateral pleural effusions increased in   size compared to 2/19/2022.    Lingular and left greater than right basilar opacification, likely   atelectasis.    --- End of Report ---    < end of copied text >

## 2022-05-12 NOTE — PROGRESS NOTE ADULT - SUBJECTIVE AND OBJECTIVE BOX
CARDIOLOGY FOLLOW UP - Dr. Mazariegos  DATE OF SERVICE: 5/12/22    CC no cp or sob      REVIEW OF SYSTEMS:  limited given mental status   \  PHYSICAL EXAM:  T(C): 36.4 (05-12-22 @ 06:48), Max: 36.5 (05-11-22 @ 20:07)  HR: 91 (05-12-22 @ 06:48) (74 - 92)  BP: 161/53 (05-12-22 @ 06:48) (145/72 - 161/53)  RR: 18 (05-12-22 @ 06:48) (18 - 18)  SpO2: 94% (05-12-22 @ 06:48) (94% - 96%)  Wt(kg): --  I&O's Summary    11 May 2022 07:01  -  12 May 2022 07:00  --------------------------------------------------------  IN: 580 mL / OUT: 0 mL / NET: 580 mL        Appearance: Normal	  Cardiovascular: Normal S1 S2,RRR, No JVD, No murmurs  Respiratory: Lungs clear to auscultation	  Gastrointestinal:  Soft, Non-tender, + BS	  Extremities: Normal range of motion, No clubbing, cyanosis or edema      Home Medications:  allopurinol 100 mg oral tablet: 1 tab(s) orally once a day (03 Apr 2022 06:34)  aspirin 81 mg oral delayed release tablet: 1 tab(s) orally once a day (03 Apr 2022 06:34)  bumetanide 2 mg oral tablet: 1 tab(s) orally once a day (03 Apr 2022 06:34)  insulin glargine 100 units/mL subcutaneous solution: 25 unit(s) subcutaneous once a day (at bedtime) (03 Apr 2022 06:34)  metOLazone 5 mg oral tablet: 1 tab(s) orally 3 times a week (03 Apr 2022 06:34)  metoprolol succinate 50 mg oral tablet, extended release: 2 tab(s) orally once a day (03 Apr 2022 06:34)  NovoLOG 100 units/mL subcutaneous solution: subcutaneous 4 times a day (before meals and at bedtime) (03 Apr 2022 06:34)  NovoLOG FlexPen 100 units/mL injectable solution: 10 unit(s) subcutaneous 3 times a day (03 Apr 2022 06:34)  oxycodone-acetaminophen 5 mg-325 mg oral tablet: 1 tab(s) orally 2 times a day (03 Apr 2022 06:34)  Pradaxa 150 mg oral capsule: 1 cap(s) orally 2 times a day (03 Apr 2022 06:34)  pregabalin 100 mg oral capsule: 1 cap(s) orally 3 times a day (03 Apr 2022 06:34)      MEDICATIONS  (STANDING):  allopurinol 100 milliGRAM(s) Oral daily  apixaban 5 milliGRAM(s) Oral two times a day  aspirin enteric coated 81 milliGRAM(s) Oral daily  atorvastatin 40 milliGRAM(s) Oral at bedtime  chlorhexidine 4% Liquid 1 Application(s) Topical <User Schedule>  dextrose 50% Injectable 25 Gram(s) IV Push once  dextrose 50% Injectable 25 Gram(s) IV Push once  dextrose 50% Injectable 12.5 Gram(s) IV Push once  diltiazem    milliGRAM(s) Oral daily  hydrALAZINE 25 milliGRAM(s) Oral three times a day  insulin lispro (ADMELOG) corrective regimen sliding scale   SubCutaneous at bedtime  insulin lispro (ADMELOG) corrective regimen sliding scale   SubCutaneous three times a day before meals  lidocaine   4% Patch 1 Patch Transdermal daily  metoprolol succinate  milliGRAM(s) Oral daily  mupirocin 2% Ointment 1 Application(s) Both Nostrils two times a day  polyethylene glycol 3350 17 Gram(s) Oral two times a day  povidone iodine 10% Solution 1 Application(s) Topical daily  QUEtiapine 25 milliGRAM(s) Oral at bedtime  senna 2 Tablet(s) Oral at bedtime  sevelamer carbonate Powder 1600 milliGRAM(s) Oral three times a day with meals      TELEMETRY: afib hr 70	    ECG:  	  RADIOLOGY:   < from: MR Head No Cont (05.11.22 @ 17:19) >    IMPRESSION: Small acute infarctions in the BILATERAL posterior centrum   semiovale and LEFT corona radiata and LEFT posterior periventricular   white matter with restricted diffusion.   mild global atrophy most   prominent within the BILATERAL cerebellum.    --- End of Report ---          < end of copied text >    DIAGNOSTIC TESTING:  [ ] Echocardiogram:  [ ]  Catheterization:  [ ] Stress Test:    OTHER: 	    LABS:	 	                            9.0    7.84  )-----------( 269      ( 10 May 2022 15:00 )             31.1     05-10    x   |  x   |  14  ----------------------------<  x   x    |  x   |  x     Ca    8.9      10 May 2022 15:00  Phos  4.4     05-10

## 2022-05-12 NOTE — PROGRESS NOTE ADULT - SUBJECTIVE AND OBJECTIVE BOX
Admitting Diagnosis:  Chronic kidney disease [N18.9]  CHRONIC KIDNEY DISEASE, UNSPECIFIED    Background:  This is a 67 year old gentleman pmhx CAD s/p MI/PCI/CABG, hx TIA, afib on rivaroxaban, HTN, DM2, PVD, gout, L CEA 2014, and CKD who presented to [facility] 4/11/22 with ANT, tremors, confusion and lethargy.  CT head no acute changes.  S/p initiation of hemodialysis.  Mental status has improved dramatically.  Pt denies any headaches, no slurred speech, no hemiparesis.  He has chronic gout with bilateral finger swelling, pain.  He also has chronic neuropathy.  Both legs are weak.      Interval Hx:  persistent encephalopathy prompted re-consultation on 5/10/22.  MRI brain was ordered and revealed small acute infarctions in bilateral posterior centrum semiovale and left corona radiata and left posterior periventricular white matter.  Carotid ultrasound was performed showing new occlusion of the left internal carotid artery, along with greater than 70% stenosis involving the distal right common carotid artery, and mild to moderate flow-limiting stenosis is seen at the left external carotid artery.  Vascular surgery has been consulted and is recommending CTA.  He continues to be encephalopathic and lethargic.      ******    Past Medical History:  HTN (hypertension), benign [401.1]  HLD (hyperlipidemia) [272.4]  DM type 2 (diabetes mellitus, type 2) [250.00]  TIA (transient ischemic attack) [435.9]  Atrial fibrillation [427.31]  MI (myocardial infarction) [410.90]  circa 2014 and 2022.  CAD (coronary artery disease) [414.00]  Neuropathy [G62.9]  Stage 3 chronic kidney disease [N18.30]  2019 novel coronavirus disease (COVID-19) [U07.1]  12/2021.  Received the COVID-19 vaccine x 2 as of April 2022.    Past Surgical History:  Status post angioplasty with stent [V45.82]  LULU x 3 2/7/2014  S/P carotid endarterectomy [Z98.89]  left  S/P CABG (coronary artery bypass graft) [Z95.1]        Social History:  No toxic habits    Family History:  FAMILY HISTORY:  Family history of heart disease          ROS: as per HPI.    Medications:  allopurinol 100 milliGRAM(s) Oral daily  apixaban 5 milliGRAM(s) Oral two times a day  aspirin enteric coated 81 milliGRAM(s) Oral daily  atorvastatin 40 milliGRAM(s) Oral at bedtime  bisacodyl 5 milliGRAM(s) Oral every 12 hours PRN  chlorhexidine 4% Liquid 1 Application(s) Topical <User Schedule>  dextrose 50% Injectable 25 Gram(s) IV Push once  dextrose 50% Injectable 25 Gram(s) IV Push once  dextrose 50% Injectable 12.5 Gram(s) IV Push once  dextrose Oral Gel 15 Gram(s) Oral once PRN  diltiazem    milliGRAM(s) Oral daily  hydrALAZINE 25 milliGRAM(s) Oral three times a day  insulin lispro (ADMELOG) corrective regimen sliding scale   SubCutaneous at bedtime  insulin lispro (ADMELOG) corrective regimen sliding scale   SubCutaneous three times a day before meals  lidocaine   4% Patch 1 Patch Transdermal daily  metoprolol succinate  milliGRAM(s) Oral daily  mupirocin 2% Ointment 1 Application(s) Both Nostrils two times a day  polyethylene glycol 3350 17 Gram(s) Oral two times a day  povidone iodine 10% Solution 1 Application(s) Topical daily  QUEtiapine 25 milliGRAM(s) Oral at bedtime  senna 2 Tablet(s) Oral at bedtime  sevelamer carbonate Powder 1600 milliGRAM(s) Oral three times a day with meals  sodium chloride 0.9% lock flush 10 milliLiter(s) IV Push every 1 hour PRN      Labs:  CBC Full  -  ( 10 May 2022 15:00 )  WBC Count : 7.84 K/uL  RBC Count : 3.84 M/uL  Hemoglobin : 9.0 g/dL  Hematocrit : 31.1 %  Platelet Count - Automated : 269 K/uL  Mean Cell Volume : 81.0 fl  Mean Cell Hemoglobin : 23.4 pg  Mean Cell Hemoglobin Concentration : 28.9 gm/dL  Auto Neutrophil # : 5.61 K/uL  Auto Lymphocyte # : 1.29 K/uL  Auto Monocyte # : 0.71 K/uL  Auto Eosinophil # : 0.14 K/uL  Auto Basophil # : 0.04 K/uL  Auto Neutrophil % : 71.5 %  Auto Lymphocyte % : 16.5 %  Auto Monocyte % : 9.1 %  Auto Eosinophil % : 1.8 %  Auto Basophil % : 0.5 %    05-10    x   |  x   |  14  ----------------------------<  x   x    |  x   |  x     Ca    8.9      10 May 2022 15:00  Phos  4.4     05-10      CAPILLARY BLOOD GLUCOSE      POCT Blood Glucose.: 187 mg/dL (12 May 2022 08:19)  POCT Blood Glucose.: 229 mg/dL (11 May 2022 20:46)  POCT Blood Glucose.: 169 mg/dL (11 May 2022 17:39)  POCT Blood Glucose.: 186 mg/dL (11 May 2022 12:04)          Vitamin B12: 467 pg/mL [584 - 1243] 05-12-22 @ 07:11  Folate: -- 05-12-22 @ 07:11  Thyroid Function: -- 05-12-22 @ 07:11  Ammonia: -- 05-12-22 @ 07:11  Dilantin: -- 05-12-22 @ 07:11      Vitals:  Vital Signs Last 24 Hrs  T(C): 36.4 (12 May 2022 06:48), Max: 36.5 (11 May 2022 20:07)  T(F): 97.5 (12 May 2022 06:48), Max: 97.7 (11 May 2022 20:07)  HR: 91 (12 May 2022 06:48) (74 - 92)  BP: 161/53 (12 May 2022 06:48) (145/72 - 161/53)  BP(mean): --  RR: 18 (12 May 2022 06:48) (18 - 18)  SpO2: 94% (12 May 2022 06:48) (94% - 96%)    NEUROLOGICAL EXAM:    Mental status: Awakens to tactile, lethargic, and in no apparent distress. Oriented to person, knows he is in a hospital but not which one, oriented to year but not to month or day.    Language intact.  Attention impaired.      Cranial Nerves: Pupils were equal, round, reactive to light. Extraocular movements were intact. B BTT Facial sensation was intact to light touch. There was no facial asymmetry. The palate was upgoing symmetrically and tongue was midline. Hearing acuity was intact to finger rub AU. Shoulder shrug was full bilaterally    Motor exam: Bulk and tone were normal. No downward drift in arms.  Legs plane of bed.  Fine finger movements were symmetric. There was bilateral symmetric pronator drift    Reflexes: DTRs depressed, flexor plantars.     Sensation: Intact to noxious, seems intact to light touch, due to decreased attention did not assess other modalities     Coordination: no gross dysmetria    Gait: deferred    NIHSS: 13    Imaging:    ACC: [id number] EXAM:  CT BRAIN                        PROCEDURE DATE:  04/09/2022      INTERPRETATION:  CLINICAL INFORMATION:  Altered mental status, on   anticoagulation .    TECHNIQUE: Multiple contiguous axial images were acquired from the   skullbase to the vertex without the administration of intravenous   contrast.  Coronal and sagittal reformations were made.    COMPARISON: CT head 10/21/2021, brain MRI 02/23/2014.    FINDINGS:    Scattered lacunar infarcts in the bilateral cerebralhemispheres are   grossly stable compared to the 10/21/2021 head CT. No new additional   infarcts are noted over the time interval.    No acute intracranial hemorrhage, mass effect, or midline shift. The   brain demonstrates periventricular hypoattenuation, nonspecific in   etiology but likely representing chronic microvascular ischemic changes.    No abnormal extra-axial fluid collections are present.    The ventricles and sulci demonstrate age appropriate involutional   changes.  The basal cisterns are patent.    The orbits are unremarkable. The paranasal sinuses are clear. The mastoid   air cells are clear. The calvarium is intact.    IMPRESSION:    No acute intracranial abnormality is noted. If the patient has new and   persistent symptoms, consider short interval follow-up head CT or brain   MRI.    --- End of Report ---    GATITO VILLARREAL MD; Resident Radiologist  This document has been electronically signed.  JESSE CORONA MD; Attending Radiologist  This document has been electronically signed. Apr 9 2022  2:00PM        ACC: [id number] EXAM:  MR BRAIN                          PROCEDURE DATE:  05/11/2022          INTERPRETATION:  MR brain  without gadolinium    CLINICAL INFORMATION: Stroke.    TECHNIQUE: Limited Sagittal T1-weighted images, axial FLAIR images, and   axial diffusion weighted images of the brain were obtained.  Patient was   unable to hold still for remaining sequences.    FINDINGS:   MRI dated 02/23/2014 available for review.    The brain demonstrates small acute infarctions in the BILATERAL posterior   centrum semiovale and LEFT corona radiata and LEFT posterior   periventricular white matter with restricted diffusion. No intracranial   hemorrhage is recognized. No mass effect is found in the brain.    The ventricles, sulci and basal cisterns show mild global atrophy most   prominent within the BILATERAL cerebellum.    The vertebral and internal carotid arteries demonstrate expected flow   voids indicating their patency.    The orbits are unremarkable.  The paranasal sinuses are clear.  The nasal   cavity appears intact.  The nasopharynx is symmetric.  The central skull   base and temporal bones are intact.  The calvarium appears unremarkable.    IMPRESSION: Small acute infarctions in the BILATERAL posterior centrum   semiovale and LEFT corona radiata and LEFT posterior periventricular   white matter with restricted diffusion.   mild global atrophy most   prominent within the BILATERAL cerebellum.    --- End of Report ---            JESÚS PADGETT MD; Attending Radiologist  This document has been electronically signed. May 11 2022  5:47PM        Patient name: DYLAN DEL CASTILLO  YOB: 1955   Age: 67 (M)   MR#: [id number]  Study Date: 2/22/2022  [facility]  Study quality: Technically difficult  Referring Physician: Mendoza Corral MD  Blood Pressure: 152/82 mmHg  Height: 180 cm  Weight: 136 kg  BSA: 2.5 m2  ------------------------------------------------------------------------  PROCEDURE: Transthoracic echocardiogram with 2-D, M-Mode  and complete spectral and color flow Doppler. Verbal  consent was obtained for injection of  Ultrasonic Enhancing  Agent following a discussion of risks and benefits.  Following intravenous injection of Ultrasonic Enhancing  Agent , harmonic imaging was performed.  INDICATION: Chest pain, unspecified (R07.9)  ------------------------------------------------------------------------  Dimensions:    Normal Values:  LA:     4.4    2.0 - 4.0 cm  Ao:     4.0    2.0 - 3.8 cm  SEPTUM: 1.1    0.6 - 1.2 cm  PWT:    0.9    0.6 - 1.1 cm  LVIDd:  5.0    3.0 - 5.6 cm  LVIDs:  3.5    1.8 - 4.0 cm  Derived variables:  LVMI: 73 g/m2  RWT: 0.36  EF (Visual Estimate): 60 %  Doppler Peak Velocity (m/sec): AoV=1.5 TV=2.2  ------------------------------------------------------------------------  Observations:  Mitral Valve: Normal mitral valve.  Aortic Valve/Aorta: Calcified aortic valve with normal  opening. Minimal aortic regurgitation.  Normal aortic root size.  Left Atrium: Mildly dilated left atrium.  Left Ventricle: Endocardial visualization enhanced with  intravenous injection of Ultrasonic Enhancing Agent  (Definity).  Normal left ventricular internal dimensions and wall  thickness.  Normal left ventricular systolic function. No segmental  wall motion abnormalities.  Right Heart: Normal right atrium. Normal right ventricular  size and function.  Normal tricuspid valve. Mild tricuspid regurgitation.  Normal pulmonic valve. Mild pulmonic regurgitation.  Pericardium/Pleura: Normal pericardium with no pericardial  effusion.  Hemodynamic: No evidence of pulmonary hypertension.  ------------------------------------------------------------------------  Conclusions:  Endocardial visualization enhanced with intravenous  injection of Ultrasonic Enhancing Agent (Definity).  Normal left ventricular systolic function. No segmental  wall motion abnormalities.  ------------------------------------------------------------------------  Confirmed on  2/22/2022 - 16:17:39 by Ishan Ackerman MD, FASE  ------------------------------------------------------------------------

## 2022-05-12 NOTE — PROGRESS NOTE ADULT - ASSESSMENT
A/P    68 y/o M with history of CAD, s/p multiple PCI, with most recent 2/22 PCI of LAD in setting of NSTEMI, on ASA only (pradaxa), chronic atrial fibrillation maintained on Pradaxa, essential HTN, type 2 DM previously on oral medications but on insulin, diabetic neuropathy with chronic RIGHT LE venous stasis changes>left, LEFT foot ulcer seen by podiatry, past endarterectomy LEFT CEA in 2014 presenting with 1 week of decreased urine output, cystitis with hematuria     #ANT on CKD, new HD  -oliguric renal failure in setting of UTI/cystitis  -etiology ??  -New HD for uremia, renal failure  -sp rrt 4/12 for uncontrolled bleeding sp  right groin shiley removal  - monitor h/h - stable  -s/p L AVG 4/18  -s/p permacath placement 4/20  -renal f/ u    #AMS/Lethargy  -recent ams from pain meds  -AMS sp RRT 4/27  likely secondary to pain med   -repeat CT 4/27 unremarkable --  discontinued Percocet  -neuro following - MRI 5/11  revealed small acute infarctions in bilateral posterior centrum semiovale and left corona radiata and left posterior periventricular white matter  -on asa statin  -recheck echo   -pending CTa head  neck    #Acute on chronic HFpEF  -stable  -continue volume removal with HD  -c/w  bb    #Chronic AF  -rates stable   -c/w metoprolol succ 100 mg qd  -c/w dilt cd 120mg daily    -brief pause noted- likely secondary to MOR  -c/w eliquis 5 mg BID for now - heme stable     #HTN  -stable  -c/w meds       #CAD, s/p PCI, most recent 2/2022  -stable, cont asa  -off plavix due to a/c indication  -statin     #carotid stenosis   -cont med tx  -previous MRA  noted with Greater than 75% stenosis of the right distal common carotid artery. Approximately 60% stenosis of the proximal left internal carotid artery.  -carotid dopplers 5/11 noted :  There is new occlusion of the left internal carotid artery;  greater than 70% stenosis involving the distal right common carotid artery as was seen previously. Mild to moderate flow-limiting stenosis is seen at the left external   carotid artery.  -Continue ASA and Statin Therapy  -neuro fu

## 2022-05-12 NOTE — PROGRESS NOTE ADULT - ASSESSMENT
·  Problem:[email or web address].co hemodialysis per renal  to cont as per renal     Problem/Plan - 2:  ·  Problem: Chronic atrial fibrillation. c/w a/c  cards f/u     Problem/Plan - 3:  ·  Problem: Cystitis.   ID follow up appreciated        Problem/Plan - 4:  ·  Problem: Type 2 diabetes mellitus. c/w insulin   endo f/u      Problem/Plan - 5:  ·  Problem: CAD (coronary artery disease).   rapid a fib  meds adjusted  hr stable  c/w a/c       Persistent encephalopathy   neuro follow up appreciated    carotid duplex noted  cta pending

## 2022-05-12 NOTE — PROGRESS NOTE ADULT - SUBJECTIVE AND OBJECTIVE BOX
[facility]   [phone]  RENAL FOLLOW UP NOTE  --------------------------------------------------------------------------------  Chief Complaint:    24 hour events/subjective:    patient seen and examined   flat affect    PAST HISTORY  --------------------------------------------------------------------------------  No significant changes to PMH, PSH, FHx, SHx, unless otherwise noted    ALLERGIES & MEDICATIONS  --------------------------------------------------------------------------------  Allergies    nitrofurantoin (Nephrotoxicity)    Intolerances      Standing Inpatient Medications  allopurinol 100 milliGRAM(s) Oral daily  apixaban 5 milliGRAM(s) Oral two times a day  aspirin enteric coated 81 milliGRAM(s) Oral daily  atorvastatin 40 milliGRAM(s) Oral at bedtime  chlorhexidine 4% Liquid 1 Application(s) Topical <User Schedule>  dextrose 50% Injectable 25 Gram(s) IV Push once  dextrose 50% Injectable 25 Gram(s) IV Push once  dextrose 50% Injectable 12.5 Gram(s) IV Push once  diltiazem    milliGRAM(s) Oral daily  epoetin naz-epbx (RETACRIT) Injectable 12734 Unit(s) IV Push once  hydrALAZINE 25 milliGRAM(s) Oral three times a day  insulin lispro (ADMELOG) corrective regimen sliding scale   SubCutaneous at bedtime  insulin lispro (ADMELOG) corrective regimen sliding scale   SubCutaneous three times a day before meals  lidocaine   4% Patch 1 Patch Transdermal daily  metoprolol succinate  milliGRAM(s) Oral daily  mupirocin 2% Ointment 1 Application(s) Both Nostrils two times a day  polyethylene glycol 3350 17 Gram(s) Oral two times a day  povidone iodine 10% Solution 1 Application(s) Topical daily  QUEtiapine 25 milliGRAM(s) Oral at bedtime  senna 2 Tablet(s) Oral at bedtime  sevelamer carbonate Powder 1600 milliGRAM(s) Oral three times a day with meals    PRN Inpatient Medications  bisacodyl 5 milliGRAM(s) Oral every 12 hours PRN  dextrose Oral Gel 15 Gram(s) Oral once PRN  sodium chloride 0.9% lock flush 10 milliLiter(s) IV Push every 1 hour PRN      REVIEW OF SYSTEMS  --------------------------------------------------------------------------------    Gen: denies fevers/chills,  CVS: denies chest pain/palpitations  Resp: denies SOB/Cough  GI: Denies N/V/Abd pain  : Denies dysuria/oliguria/hematuria    VITALS/PHYSICAL EXAM  --------------------------------------------------------------------------------  T(C): 36.6 (05-12-22 @ 17:00), Max: 36.6 (05-12-22 @ 17:00)  HR: 90 (05-12-22 @ 17:00) (90 - 93)  BP: 157/59 (05-12-22 @ 17:00) (148/58 - 161/53)  RR: 18 (05-12-22 @ 17:00) (18 - 18)  SpO2: 95% (05-12-22 @ 17:00) (94% - 99%)  Wt(kg): --        05-11-22 @ 07:01  -  05-12-22 @ 07:00  --------------------------------------------------------  IN: 580 mL / OUT: 0 mL / NET: 580 mL      Physical Exam:  	              Gen: awake, oriented 2-3 t, appears comfortable  	Pulm: Decreased breath sounds b/l bases.  	CV: No JVD. IRR,   	Abd: +BS, soft, nontender, softly distended, obese               Extremity no edema              Extremity LE: + hyperpigmented bilateral LE with no edema              Vascular: R IJ HD catheter, L arm AVF     LABS/STUDIES  --------------------------------------------------------------------------------    x   |  x   |  14  ----------------------------<  x       [05-10-22 @ 17:54]  x    |  x   |  x               Creatinine Trend:  SCr 8.68 [05-10 @ 15:00]  SCr 5.39 [05-08 @ 10:30]  SCr 6.40 [05-07 @ 06:42]  SCr 4.44 [05-06 @ 07:57]  SCr 7.41 [05-06 @ 00:06]                  Iron 16, TIBC 239, %sat 7      [04-15-22 @ 10:16]  Ferritin 172      [04-15-22 @ 09:05]  PTH -- (Ca 8.3)      [04-15-22 @ 12:18]   150  PTH -- (Ca --)      [04-03-22 @ 23:06]   97  HbA1c 11.7      [11-07-19 @ 09:14]  TSH 1.71      [05-12-22 @ 07:11]

## 2022-05-12 NOTE — CHART NOTE - NSCHARTNOTEFT_GEN_A_CORE
Informed by Vascular patient to have CTA head and neck done today prior to HD.   Spoke with Dr. Crowell to confirm HD today- will be at EOD today (5pm).   Spoke with vascular to confirm above plan- vascular team to escalate CTA head and neck with radiology.

## 2022-05-12 NOTE — PROGRESS NOTE ADULT - ASSESSMENT
67 year old gentleman with PMH of CAD, NSTEMI s/p 3 stents in 2014 (stents to LAD, proximal and distal LCx), ischemic cardiomyopathy, HTN for 10 years, T2DM for 26 years c/b peripheral neuropathy, CVA, TIA, Afib on Pradaxa, chronic RLE wound, L carotid stenosis s/p endarterectomy (2014) just recently admitted  to the [facility] on 2/19/2022 with acute onset chest pain for one day found to have an NSTEMI, CAD, s/p PCI in setting of increased AF rates off bb for 3 days and resolved chest pain, his CKMB peaked and Echo noted with EF 60%,normal LV function, mild TR, mild HI. He was s/p [facility] with 85% proximal LAD s/p balloon angioplasty and LULU. He presented to [facility] ED with decreased urine output over the week. [facility]. Stevens went to [facility] for hematuria and was started  on Nitrofurantoin. Pt is still taking Nitrofurantoin w/ 5 days left. He came to the ED due to worsening symptoms. Pt reports having a similar incident 4-5 years ago that resolved spontaneously. He reports increased leg edema Dyspnea worse on exertion. his baseline Serum creatinine is in the 2.0 to 2.3 mg/dl range. ANT with serum creatinine of 8.29 with bun 144 and k 5.5      1- ANT on ckd III ---> ESRD likely   2- chf chronic   3- hyperphosphatemia /shpt   4- anemia   5- hyperlipidemia   6- HTN /a fib  7- TIA hx   8- hx of carotid stenosis      plan for CTA H/N for eval carotid stenosis  HD after CTA  F200, 225 min, , , 1L fluid removal  see HD flowsheet  to have CXR to eval fluid status  dialysis urr in range   cbc bmp pre dialysis  renvela 2 tab with meals   anemia - epogen 25730 Units TIW with HD.  PT  seroquel 25 mg daily

## 2022-05-12 NOTE — PROGRESS NOTE ADULT - SUBJECTIVE AND OBJECTIVE BOX
Patient is a 67y old  Male who presents with a chief complaint of Decreased urine output for the past week, leg oedema (12 May 2022 13:22)      Vascular Surgery Attending Progress Note    Interval HPI: pt w/o new c/o     Medications:  allopurinol 100 milliGRAM(s) Oral daily  apixaban 5 milliGRAM(s) Oral two times a day  aspirin enteric coated 81 milliGRAM(s) Oral daily  atorvastatin 40 milliGRAM(s) Oral at bedtime  bisacodyl 5 milliGRAM(s) Oral every 12 hours PRN  chlorhexidine 4% Liquid 1 Application(s) Topical <User Schedule>  dextrose 50% Injectable 25 Gram(s) IV Push once  dextrose 50% Injectable 25 Gram(s) IV Push once  dextrose 50% Injectable 12.5 Gram(s) IV Push once  dextrose Oral Gel 15 Gram(s) Oral once PRN  diltiazem    milliGRAM(s) Oral daily  epoetin naz-epbx (RETACRIT) Injectable 84992 Unit(s) IV Push once  hydrALAZINE 25 milliGRAM(s) Oral three times a day  insulin lispro (ADMELOG) corrective regimen sliding scale   SubCutaneous at bedtime  insulin lispro (ADMELOG) corrective regimen sliding scale   SubCutaneous three times a day before meals  lidocaine   4% Patch 1 Patch Transdermal daily  metoprolol succinate  milliGRAM(s) Oral daily  mupirocin 2% Ointment 1 Application(s) Both Nostrils two times a day  polyethylene glycol 3350 17 Gram(s) Oral two times a day  povidone iodine 10% Solution 1 Application(s) Topical daily  QUEtiapine 25 milliGRAM(s) Oral at bedtime  senna 2 Tablet(s) Oral at bedtime  sevelamer carbonate Powder 1600 milliGRAM(s) Oral three times a day with meals  sodium chloride 0.9% lock flush 10 milliLiter(s) IV Push every 1 hour PRN      Vital Signs Last 24 Hrs  T(C): 36.5 (12 May 2022 12:59), Max: 36.5 (11 May 2022 20:07)  T(F): 97.7 (12 May 2022 12:59), Max: 97.7 (11 May 2022 20:07)  HR: 93 (12 May 2022 12:59) (91 - 93)  BP: 156/70 (12 May 2022 12:59) (148/58 - 161/53)  BP(mean): --  RR: 18 (12 May 2022 12:59) (18 - 18)  SpO2: 99% (12 May 2022 12:59) (94% - 99%)  I&O's Summary    11 May 2022 07:01  -  12 May 2022 07:00  --------------------------------------------------------  IN: 580 mL / OUT: 0 mL / NET: 580 mL        Physical Exam:  Neuro  A&Ox3 VSS  Vascular:  stable no acute changes     LABS:    05-10    x   |  x   |  14  ----------------------------<  x   x    |  x   |  x       MRI Brain reviewed       UMAIR MCGRAW MD  [phone] Cell [phone]

## 2022-05-12 NOTE — PROGRESS NOTE ADULT - PROBLEM SELECTOR PLAN 1
Likely 2nd to b/l pl effusions, atelectasis  -Suspect fluid overload given elevated proBNP, LE edema on admission   -Keep O>I with HD as tolerated   -Pt with hx of hydroPTX. CT chest in 2/2022 with pleural thickening, atelectasis. Findings at the time suspected to be chronic. CT A/P 4/2 with small b/l pl effusions L>R, pleural thickening  -CT chest now with small b/l pl effusion R>L, bibasilar atelectasis  -SOB resolved  -Keep sats >90% with supplemental O2 as needed (currently RA)  -CXR 5/12 with small L effusion, f/u official report

## 2022-05-12 NOTE — PROGRESS NOTE ADULT - PROBLEM SELECTOR PLAN 3
I Edward Pinto MD have personally  seen and examined the patient today and have noted the findings and formulated the plan of care.  I Edward Pinto MD have personally seen and examined the patient at bedside today at 730 am

## 2022-05-12 NOTE — PROGRESS NOTE ADULT - SUBJECTIVE AND OBJECTIVE BOX
DATE OF SERVICE: 05-12-22 @ 13:22  CHIEF COMPLAINT:Patient is a 67y old  Male who presents with a chief complaint of Decreased urine output for the past week, leg oedema (12 May 2022 12:23)    	        PAST MEDICAL & SURGICAL HISTORY:  HTN (hypertension), benign      HLD (hyperlipidemia)      DM type 2 (diabetes mellitus, type 2)      TIA (transient ischemic attack)      Atrial fibrillation      MI (myocardial infarction)  circa 2014 and 2022.      CAD (coronary artery disease)      Neuropathy      Stage 3 chronic kidney disease      2019 novel coronavirus disease (COVID-19)  12/2021.  Received the COVID-19 vaccine x 2 as of April 2022.      Status post angioplasty with stent  LULU x 3 2/7/2014      S/P carotid endarterectomy  left              REVIEW OF SYSTEMS:  weak  EYES: No eye pain, visual disturbances, or discharge  NECK: No pain or stiffness  RESPIRATORY: No cough, wheezing, chills or hemoptysis; No Shortness of Breath  CARDIOVASCULAR: No chest pain, palpitations, passing out, dizziness  GASTROINTESTINAL: No abdominal or epigastric pain. No nausea, vomiting, or hematemesis  GENITOURINARY: No dysuria, frequency, hematuria,   NEUROLOGICAL: sleepy a times     Medications:  MEDICATIONS  (STANDING):  allopurinol 100 milliGRAM(s) Oral daily  apixaban 5 milliGRAM(s) Oral two times a day  aspirin enteric coated 81 milliGRAM(s) Oral daily  atorvastatin 40 milliGRAM(s) Oral at bedtime  chlorhexidine 4% Liquid 1 Application(s) Topical <User Schedule>  dextrose 50% Injectable 25 Gram(s) IV Push once  dextrose 50% Injectable 25 Gram(s) IV Push once  dextrose 50% Injectable 12.5 Gram(s) IV Push once  diltiazem    milliGRAM(s) Oral daily  epoetin naz-epbx (RETACRIT) Injectable 64303 Unit(s) IV Push once  hydrALAZINE 25 milliGRAM(s) Oral three times a day  insulin lispro (ADMELOG) corrective regimen sliding scale   SubCutaneous at bedtime  insulin lispro (ADMELOG) corrective regimen sliding scale   SubCutaneous three times a day before meals  lidocaine   4% Patch 1 Patch Transdermal daily  metoprolol succinate  milliGRAM(s) Oral daily  mupirocin 2% Ointment 1 Application(s) Both Nostrils two times a day  polyethylene glycol 3350 17 Gram(s) Oral two times a day  povidone iodine 10% Solution 1 Application(s) Topical daily  QUEtiapine 25 milliGRAM(s) Oral at bedtime  senna 2 Tablet(s) Oral at bedtime  sevelamer carbonate Powder 1600 milliGRAM(s) Oral three times a day with meals    MEDICATIONS  (PRN):  bisacodyl 5 milliGRAM(s) Oral every 12 hours PRN Constipation  dextrose Oral Gel 15 Gram(s) Oral once PRN Blood Glucose LESS THAN 70 milliGRAM(s)/deciliter  sodium chloride 0.9% lock flush 10 milliLiter(s) IV Push every 1 hour PRN Pre/post blood products, medications, blood draw, and to maintain line patency    	    PHYSICAL EXAM:  T(C): 36.5 (05-12-22 @ 12:59), Max: 36.5 (05-11-22 @ 20:07)  HR: 93 (05-12-22 @ 12:59) (91 - 93)  BP: 156/70 (05-12-22 @ 12:59) (148/58 - 161/53)  RR: 18 (05-12-22 @ 12:59) (18 - 18)  SpO2: 99% (05-12-22 @ 12:59) (94% - 99%)  Wt(kg): --  I&O's Summary    11 May 2022 07:01  -  12 May 2022 07:00  --------------------------------------------------------  IN: 580 mL / OUT: 0 mL / NET: 580 mL        Appearance: Normal	  HEENT:   Normal oral mucosa, PERRL, EOMI	  Lymphatic: No lymphadenopathy  Cardiovascular: Normal S1 S2, No JVD, No murmurs, No edema  Respiratory: Lungs clear to auscultation	    Gastrointestinal:  Soft, Non-tender, + BS	    Neurologic: Non-focal  Extremities:dec rom   pvd    TELEMETRY: 	    ECG:  	  RADIOLOGY:  OTHER: 	  	  LABS:	 	    CARDIAC MARKERS:                                9.0    7.84  )-----------( 269      ( 10 May 2022 15:00 )             31.1     05-10    x   |  x   |  14  ----------------------------<  x   x    |  x   |  x     Ca    8.9      10 May 2022 15:00  Phos  4.4     05-10      proBNP:   Lipid Profile:   HgA1c:   TSH: Thyroid Stimulating Hormone, Serum: 1.71 uIU/mL (05-12 @ 07:11)

## 2022-05-12 NOTE — PROGRESS NOTE ADULT - ASSESSMENT
67M PMH of CAD, NSTEMI s/p 3 stents in 2014 (stents to LAD, proximal and distal LCx), ischemic cardiomyopathy, HTN for 10 years, T2DM for 26 years c/b peripheral neuropathy, CVA, TIA, Afib on Pradaxa, chronic RLE wound, L carotid stenosis s/p endarterectomy (2014) just recently admitted  to the [facility] on 2/19/2022 with acute onset chest pain for one day found to have an NSTEMI, CAD, s/p PCI in setting of increased AF rates off bb for 3 days and resolved chest pain, his CKMB peaked and Echo noted with EF 60%,normal LV function, mild TR, mild OH. He was s/p [facility] with 85% proximal LAD s/p balloon angioplasty and LULU. He presented to [facility] ED with decreased urine output over the week. Carotid duplex showed Calcified atherosclerotic disease seen throughout the carotid systems. There is new occlusion of the left internal carotid artery. There is greater than 70% stenosis involving the distal right common carotid artery as was seen previously. Mild to moderate flow-limiting stenosis is seen at the left external carotid artery. Vascular surgery consulted for carotid stenosis. Pt currently denies vision changes, dizziness, lightheadedness or any other symptoms at this point in time.     PLAN:  -recommend  cta or mra of the neck to eval  rt ica stenosis  will follow

## 2022-05-13 NOTE — PROGRESS NOTE ADULT - ASSESSMENT
A/P    68 y/o M with history of CAD, s/p multiple PCI, with most recent 2/22 PCI of LAD in setting of NSTEMI, on ASA only (pradaxa), chronic atrial fibrillation maintained on Pradaxa, essential HTN, type 2 DM previously on oral medications but on insulin, diabetic neuropathy with chronic RIGHT LE venous stasis changes>left, LEFT foot ulcer seen by podiatry, past endarterectomy LEFT CEA in 2014 presenting with 1 week of decreased urine output, cystitis with hematuria     #ANT on CKD, new HD  -oliguric renal failure in setting of UTI/cystitis  -etiology ??  -New HD for uremia, renal failure  -sp rrt 4/12 for uncontrolled bleeding sp  right groin shiley removal  - monitor h/h - stable  -s/p L AVG 4/18  -s/p permacath placement 4/20  -renal f/ u    #AMS/Lethargy  -recent ams from pain meds  -AMS sp RRT 4/27  likely secondary to pain med   -repeat CT 4/27 unremarkable --  discontinued Percocet  -MRI 5/11  revealed small acute infarctions in bilateral posterior centrum semiovale and left corona radiata and left posterior periventricular white matter  -on asa statin  -recheck echo    -CTa head  neck noted; neuro following     #Acute on chronic HFpEF  -stable  -continue volume removal with HD  -c/w  bb    #Chronic AF  -rates stable   -c/w metoprolol succ 100 mg qd  -c/w dilt cd 120mg daily    -brief pause noted- likely secondary to MOR  -c/w eliquis 5 mg BID for now - heme stable     #HTN  -stable  -c/w meds       #CAD, s/p PCI, most recent 2/2022  -stable, cont asa  -off plavix due to a/c indication  -statin     #carotid stenosis   -previous MRA  noted with Greater than 75% stenosis of the right distal common carotid artery. Approximately 60% stenosis of the proximal left internal carotid artery.  -carotid dopplers 5/11 noted :  There is new occlusion of the left internal carotid artery;  greater than 70% stenosis involving the distal right common carotid artery as was seen previously. Mild to moderate flow-limiting stenosis is seen at the left external   carotid artery.  -CTa head and neck 5/12 noted  -Continue ASA and Statin Therapy  -neuro fu  / work up / imaging per vascular

## 2022-05-13 NOTE — PROGRESS NOTE ADULT - ASSESSMENT
·  Problem:[email or web address] hemodialysis per renal  to cont as per renal     Problem/Plan - 2:  ·  Problem: Chronic atrial fibrillation. c/w a/c  cards f/u     Problem/Plan - 3:  ·  Problem: Cystitis.   ID follow up appreciated        Problem/Plan - 4:  ·  Problem: Type 2 diabetes mellitus. c/w insulin   endo f/u      Problem/Plan - 5:  ·  Problem: CAD (coronary artery disease).   rapid a fib  meds adjusted  hr stable  c/w a/c       Persistent encephalopathy   neuro follow up appreciated    carotid duplex noted  cta noted  mra/   vasc to f/u  discussed w/ son at bedside

## 2022-05-13 NOTE — CHART NOTE - NSCHARTNOTEFT_GEN_A_CORE
Nutrition Follow Up Note  Patient seen for: malnutrition follow up    Chart reviewed, events noted: As per chart, "pt is a 66 y/o M with PMH of pleural effusion, CAD,HTN, HLD, TIA, Afib, MI, nephropathy, CKD,  stage3, cystitis, AMS, DM, NSTEMI,  chronic RLE wound, L carotid stenosis s/p endarterectomy (). Presented to ED with hematuria and decreased urine output over the past week, was prescribed Nitrofurantoin from an urgent care center. Also with increase in B/L LE edema, weight gain, and orthopnea. On admission, labs notable for creatinine of 7.44 (noted to be 2.2 in 2022) - urgent HD initiated 2nd to uremia. Pulmonary called to consult for mild SOB, hx of abnormal CT chest. Acute kidney injury superimposed on CKD. Tunneled HD catheter placed in IR, continue on HD".         Source: [] Patient       [x] Medical record        [x] RN        [] Family at bedside       [] Other:    -If unable to interview patient: [] Trach/Vent/BiPAP  [x] Disoriented/confused/inappropriate to interview    Diet Order:     - Is current order appropriate/adequate? [] Yes  []  No:     - PO intake :   [x] >75%  Adequate    [] 50-75%  Fair       [] <50%  Poor    - Nutrition-related concerns: Pt with AMS unable to provide information . Per RN, Pt with fair to good  appetite and PO intake, consuming 50-75% of all meals.   -RN  denies pt with nausea, vomiting, diarrhea, constipation and any swallowing or chewing difficulties.     GI:  Last BM .  Bowel Regimen? [x] Yes Miralax, Senna.       Weights:   Daily Weight in k.3 (-12), Weight in k.3 (-12), Weight in k (-10), Weight in k.5 (-10), Weight in k.1 ()    Nutritionally Pertinent Medications:    atorvastatin   insulin lispro (ADMELOG)  polyethylene glycol   senna     Labs:    Bmg/dL, 175mg/dL    Na+: 134<L>  BUN: 37<H>  Cr: 7.34<H>  eGFR: 8<l>  HbA1c: 10.1% (2/19)    Skin per nursing documentation: No pressure injury, as per flow sheet  Edema per nursing documentation: None noted on , as per flow sheet    Estimated Needs:   [x] no change since previous assessment  Estimated  Energy Needs:  78.2kg x 30-35kcal/ux=8095-8780npsj/day  Estimated Protein Needs:78.2 x 1.2-1.4g/kg= 93.8-109.5g/day  Estimated Fluid Needs: deferred to team   IBW (78.2kg) was used for energy and protein calculations.    Previous Nutrition Diagnosis: Inadequate oral intake, Increased nutrient needs; Severe acute malnutrition     Nutrition Diagnosis is: [x] ongoing  [] resolved [] not applicable     New Nutrition Diagnosis: [x] Not applicable    Nutrition Care Plan:  [] In Progress  [x] Achieved  [] Not applicable    Nutrition Interventions:     Education Provided:       [] Yes:  [x] No: pt with AMS    Recommendations:        1) Continue Consistent Carbohydrate, Renal diet.  2) Recommend add Nepro 1x/day, and Nephro-Cheyanne supplement pending no medical contraindications.  3) Monitor PO intake, GI tolerance, skin integrity, labs, weight, and bowel movement regularity.           Monitoring and Evaluation:   Continue to monitor nutritional intake, tolerance to diet prescription, weights, labs, skin integrity      RD remains available upon request and will follow up per protocol.  Aleksandar Colon Dietetic Intern Nutrition Follow Up Note  Patient seen for: malnutrition follow up    Chart reviewed, events noted: As per chart, "pt is a 68 y/o M with PMH of pleural effusion, CAD,HTN, HLD, TIA, Afib, MI, nephropathy, CKD,  stage3, cystitis, AMS, DM, NSTEMI,  chronic RLE wound, L carotid stenosis s/p endarterectomy (). Presented to ED with hematuria and decreased urine output over the past week, was prescribed Nitrofurantoin from an urgent care center. Also with increase in B/L LE edema, weight gain, and orthopnea. On admission, labs notable for creatinine of 7.44 (noted to be 2.2 in 2022) - urgent HD initiated 2nd to uremia. Pulmonary called to consult for mild SOB, hx of abnormal CT chest. Acute kidney injury superimposed on CKD. Tunneled HD catheter placed in IR, continue on HD".         Source: [] Patient       [x] Medical record        [x] RN        [] Family at bedside       [] Other:    -If unable to interview patient: [] Trach/Vent/BiPAP  [x] Disoriented/confused/inappropriate to interview    Diet Order:     - Is current order appropriate/adequate? [x] Yes  []  No:     - PO intake :   [x] >75%  Adequate    [] 50-75%  Fair       [] <50%  Poor    - Nutrition-related concerns: no nutritional concerns at this time.   GI:  Last BM .  Bowel Regimen? [x] Yes Miralax, Senna.       Weights:   Daily Weight in k.3 (-12), Weight in k.3 (-12), Weight in k (-10), Weight in k.5 (-10), Weight in k.1 (-07)    Nutritionally Pertinent Medications:    atorvastatin   insulin lispro (ADMELOG)  polyethylene glycol   senna     Labs:    Bmg/dL, 175mg/dL    Na+: 134<L>  BUN: 37<H>  Cr: 7.34<H>  eGFR: 8<l>  HbA1c: 10.1% ()    Skin per nursing documentation: No pressure injury, as per flow sheet  Edema per nursing documentation: None noted on , as per flow sheet    Estimated Needs:   [x] no change since previous assessment  Estimated  Energy Needs:  78.2kg x 30-35kcal/ju=3174-0999nvnn/day  Estimated Protein Needs:78.2 x 1.2-1.4g/kg= 93.8-109.5g/day  Estimated Fluid Needs: deferred to team   IBW (78.2kg) was used for energy and protein calculations.    Previous Nutrition Diagnosis: Inadequate oral intake, Increased nutrient needs; Severe acute malnutrition     Nutrition Diagnosis is: [x] ongoing  [] resolved [] not applicable     New Nutrition Diagnosis: [x] Not applicable    Nutrition Care Plan:  [] In Progress  [x] Achieved  [] Not applicable    Nutrition Interventions:     Education Provided:       [] Yes:  [x] No: pt with AMS    Recommendations:        1) Continue Consistent Carbohydrate, Renal diet.  2) Recommend add Nepro 1x/day, and Nephro-Cheyanne supplement pending no medical contraindications.  3) Monitor PO intake, GI tolerance, skin integrity, labs, weight, and bowel movement regularity.           Monitoring and Evaluation:   Continue to monitor nutritional intake, tolerance to diet prescription, weights, labs, skin integrity      RD remains available upon request and will follow up per protocol.  Aleksandar Colon Dietetic Intern Nutrition Follow Up Note  Patient seen for: malnutrition follow up    Chart reviewed, events noted: As per chart, "pt is a 68 y/o M with PMH of pleural effusion, CAD,HTN, HLD, TIA, Afib, MI, nephropathy, CKD,  stage3, cystitis, AMS, DM, NSTEMI,  chronic RLE wound, L carotid stenosis s/p endarterectomy (). Presented to ED with hematuria and decreased urine output over the past week, was prescribed Nitrofurantoin from an urgent care center. Also with increase in B/L LE edema, weight gain, and orthopnea. On admission, labs notable for creatinine of 7.44 (noted to be 2.2 in 2022) - urgent HD initiated 2nd to uremia. Pulmonary called to consult for mild SOB, hx of abnormal CT chest. Acute kidney injury superimposed on CKD. Tunneled HD catheter placed in IR, continue on HD".         Source: [] Patient       [x] Medical record        [x] RN        [] Family at bedside       [] Other:    -If unable to interview patient: [] Trach/Vent/BiPAP  [x] Disoriented/confused/inappropriate to interview    Diet Order:     - Is current order appropriate/adequate? [x] Yes  []  No:     - PO intake :   [x] >75%  Adequate    [] 50-75%  Fair       [] <50%  Poor    - Nutrition-related concerns: no nutritional concerns at this time.   GI:  Last BM .  Bowel Regimen? [x] Yes Miralax, Senna.       Weights:   Daily Weight in k.3 (-12), Weight in k.3 (-12), Weight in k (-10), Weight in k.5 (-10), Weight in k.1 (-07)    Nutritionally Pertinent Medications:    atorvastatin   insulin lispro (ADMELOG)  polyethylene glycol   senna     Labs:    Bmg/dL, 175mg/dL    Na+: 134<L>  BUN: 37<H>  Cr: 7.34<H>  eGFR: 8<l>  HbA1c: 10.1% ()    Skin per nursing documentation: No pressure injury, as per flow sheet  Edema per nursing documentation: None noted on , as per flow sheet    Estimated Needs:   [x] no change since previous assessment  Estimated  Energy Needs:  78.2kg x 30-35kcal/si=5758-6950upvj/day  Estimated Protein Needs:78.2 x 1.2-1.4g/kg= 93.8-109.5g/day  Estimated Fluid Needs: deferred to team   IBW (78.2kg) was used for energy and protein calculations.    Previous Nutrition Diagnosis: Inadequate oral intake, Increased nutrient needs; Severe acute malnutrition     Nutrition Diagnosis is: [x] ongoing  [] resolved [] not applicable     New Nutrition Diagnosis: [x] Not applicable    Nutrition Care Plan:  [] In Progress  [x] Achieved  [] Not applicable    Nutrition Interventions:     Education Provided:       [] Yes:  [x] No: pt with AMS    Recommendations:        1) Continue Consistent Carbohydrate, Renal diet.  2) Recommend add Nepro 1x/day, and Nephro-Cheyanne supplement ( discussed with provider).   3) Monitor PO intake, GI tolerance, skin integrity, labs, weight, and bowel movement regularity.           Monitoring and Evaluation:   Continue to monitor nutritional intake, tolerance to diet prescription, weights, labs, skin integrity      RD remains available upon request and will follow up per protocol.  Aleksandar Colon Dietetic Intern

## 2022-05-13 NOTE — PROGRESS NOTE ADULT - PROBLEM SELECTOR PLAN 1
Likely 2nd to b/l pl effusions, atelectasis  -Suspect fluid overload given elevated proBNP, LE edema on admission   -Keep O>I with HD as tolerated   -Pt with hx of hydroPTX. CT chest in 2/2022 with pleural thickening, atelectasis. Findings at the time suspected to be chronic. CT A/P 4/2 with small b/l pl effusions L>R, pleural thickening  -CT chest now with small b/l pl effusion R>L, bibasilar atelectasis  -SOB resolved  -Keep sats >90% with supplemental O2 as needed (currently RA)  -CXR 5/12 with small L effusion

## 2022-05-13 NOTE — PROGRESS NOTE ADULT - SUBJECTIVE AND OBJECTIVE BOX
DATE OF SERVICE: 05-13-22 @ 11:47  CHIEF COMPLAINT:Patient is a 67y old  Male who presents with a chief complaint of Decreased urine output for the past week, leg oedema (13 May 2022 09:40)    	        PAST MEDICAL & SURGICAL HISTORY:  HTN (hypertension), benign      HLD (hyperlipidemia)      DM type 2 (diabetes mellitus, type 2)      TIA (transient ischemic attack)      Atrial fibrillation      MI (myocardial infarction)  circa 2014 and 2022.      CAD (coronary artery disease)      Neuropathy      Stage 3 chronic kidney disease      2019 novel coronavirus disease (COVID-19)  12/2021.  Received the COVID-19 vaccine x 2 as of April 2022.      Status post angioplasty with stent  LULU x 3 2/7/2014      S/P carotid endarterectomy  left            lethargic   arousable  answers questions    NECK: No pain or stiffness  RESPIRATORY: No cough, wheezing, chills or hemoptysis; No Shortness of Breath  CARDIOVASCULAR: No chest pain, palpitations, passing out,   GASTROINTESTINAL: No abdominal or epigastric pain. No nausea, vomiting, or hematemesis;   GENITOURINARY: No dysuria, frequency, hematuria,   NEUROLOGICAL: No headaches,    Medications:  MEDICATIONS  (STANDING):  allopurinol 100 milliGRAM(s) Oral daily  apixaban 5 milliGRAM(s) Oral two times a day  aspirin enteric coated 81 milliGRAM(s) Oral daily  atorvastatin 40 milliGRAM(s) Oral at bedtime  chlorhexidine 4% Liquid 1 Application(s) Topical <User Schedule>  dextrose 50% Injectable 25 Gram(s) IV Push once  dextrose 50% Injectable 25 Gram(s) IV Push once  dextrose 50% Injectable 12.5 Gram(s) IV Push once  diltiazem    milliGRAM(s) Oral daily  hydrALAZINE 25 milliGRAM(s) Oral three times a day  insulin lispro (ADMELOG) corrective regimen sliding scale   SubCutaneous three times a day before meals  insulin lispro (ADMELOG) corrective regimen sliding scale   SubCutaneous at bedtime  lidocaine   4% Patch 1 Patch Transdermal daily  metoprolol succinate  milliGRAM(s) Oral daily  mupirocin 2% Ointment 1 Application(s) Both Nostrils two times a day  polyethylene glycol 3350 17 Gram(s) Oral two times a day  povidone iodine 10% Solution 1 Application(s) Topical daily  QUEtiapine 25 milliGRAM(s) Oral at bedtime  senna 2 Tablet(s) Oral at bedtime  sevelamer carbonate Powder 1600 milliGRAM(s) Oral three times a day with meals    MEDICATIONS  (PRN):  bisacodyl 5 milliGRAM(s) Oral every 12 hours PRN Constipation  dextrose Oral Gel 15 Gram(s) Oral once PRN Blood Glucose LESS THAN 70 milliGRAM(s)/deciliter  sodium chloride 0.9% lock flush 10 milliLiter(s) IV Push every 1 hour PRN Pre/post blood products, medications, blood draw, and to maintain line patency    	    PHYSICAL EXAM:  T(C): 36.7 (05-13-22 @ 04:57), Max: 36.7 (05-13-22 @ 04:57)  HR: 95 (05-13-22 @ 04:57) (90 - 122)  BP: 115/60 (05-13-22 @ 04:57) (115/60 - 157/59)  RR: 18 (05-13-22 @ 04:57) (18 - 18)  SpO2: 96% (05-13-22 @ 04:57) (95% - 99%)  Wt(kg): --  I&O's Summary      Appearance: Normal	  HEENT:   Normal oral mucosa, PERRL, EOMI	    Cardiovascular: Normal S1 S2, No JVD  Respiratory: Lungs clear to auscultation	    Gastrointestinal:  Soft, Non-tender, + BS	    Neurologic: Non-focal  Extremities:dec rom  pvd    TELEMETRY: 	    ECG:  	  RADIOLOGY:  OTHER: 	  	  LABS:	 	    CARDIAC MARKERS:                                8.9    7.60  )-----------( 266      ( 12 May 2022 17:15 )             30.5     05-12    134<L>  |  95<L>  |  37<H>  ----------------------------<  188<H>  4.9   |  26  |  7.34<H>    Ca    8.8      12 May 2022 17:15      proBNP:   Lipid Profile:   HgA1c:   TSH:

## 2022-05-13 NOTE — PROGRESS NOTE ADULT - SUBJECTIVE AND OBJECTIVE BOX
[facility]   [phone]  RENAL FOLLOW UP NOTE  --------------------------------------------------------------------------------  Chief Complaint:    24 hour events/subjective:    seen earlier   son at bedside   still intermittent confused     PAST HISTORY  --------------------------------------------------------------------------------  No significant changes to PMH, PSH, FHx, SHx, unless otherwise noted    ALLERGIES & MEDICATIONS  --------------------------------------------------------------------------------  Allergies    nitrofurantoin (Nephrotoxicity)    Intolerances      Standing Inpatient Medications  allopurinol 100 milliGRAM(s) Oral daily  apixaban 5 milliGRAM(s) Oral two times a day  aspirin enteric coated 81 milliGRAM(s) Oral daily  atorvastatin 40 milliGRAM(s) Oral at bedtime  chlorhexidine 4% Liquid 1 Application(s) Topical <User Schedule>  dextrose 50% Injectable 25 Gram(s) IV Push once  dextrose 50% Injectable 25 Gram(s) IV Push once  dextrose 50% Injectable 12.5 Gram(s) IV Push once  diltiazem    milliGRAM(s) Oral daily  hydrALAZINE 25 milliGRAM(s) Oral three times a day  insulin lispro (ADMELOG) corrective regimen sliding scale   SubCutaneous three times a day before meals  insulin lispro (ADMELOG) corrective regimen sliding scale   SubCutaneous at bedtime  lidocaine   4% Patch 1 Patch Transdermal daily  metoprolol succinate  milliGRAM(s) Oral daily  mupirocin 2% Ointment 1 Application(s) Both Nostrils two times a day  polyethylene glycol 3350 17 Gram(s) Oral two times a day  povidone iodine 10% Solution 1 Application(s) Topical daily  QUEtiapine 25 milliGRAM(s) Oral at bedtime  senna 2 Tablet(s) Oral at bedtime  sevelamer carbonate Powder 1600 milliGRAM(s) Oral three times a day with meals    PRN Inpatient Medications  bisacodyl 5 milliGRAM(s) Oral every 12 hours PRN  dextrose Oral Gel 15 Gram(s) Oral once PRN  sodium chloride 0.9% lock flush 10 milliLiter(s) IV Push every 1 hour PRN      REVIEW OF SYSTEMS  --------------------------------------------------------------------------------    ROS not reliable     VITALS/PHYSICAL EXAM  --------------------------------------------------------------------------------  T(C): 36.7 (05-13-22 @ 12:02), Max: 36.7 (05-13-22 @ 04:57)  HR: 85 (05-13-22 @ 12:02) (85 - 122)  BP: 130/71 (05-13-22 @ 12:02) (115/60 - 148/67)  RR: 18 (05-13-22 @ 12:02) (18 - 18)  SpO2: 97% (05-13-22 @ 12:02) (96% - 98%)  Wt(kg): --        Physical Exam:  	         Gen: awake, oriented 1-2 , appears comfortable  	Pulm: Decreased breath sounds b/l bases.  	CV: No JVD. IRR,   	Abd: +BS, soft, nontender, softly distended, obese               Extremity no edema              Extremity LE: + hyperpigmented bilateral LE with no edema              Vascular: R IJ HD catheter, L arm AVF       LABS/STUDIES  --------------------------------------------------------------------------------              8.9    7.60  >-----------<  266      [05-12-22 @ 17:15]              30.5     134  |  95  |  37  ----------------------------<  188      [05-12-22 @ 17:15]  4.9   |  26  |  7.34        Ca     8.8     [05-12-22 @ 17:15]            Creatinine Trend:  SCr 7.34 [05-12 @ 17:15]  SCr 8.68 [05-10 @ 15:00]  SCr 5.39 [05-08 @ 10:30]  SCr 6.40 [05-07 @ 06:42]  SCr 4.44 [05-06 @ 07:57]                  Iron 16, TIBC 239, %sat 7      [04-15-22 @ 10:16]  Ferritin 172      [04-15-22 @ 09:05]  PTH -- (Ca 8.3)      [04-15-22 @ 12:18]   150  PTH -- (Ca --)      [04-03-22 @ 23:06]   97  HbA1c 11.7      [11-07-19 @ 09:14]  TSH 1.71      [05-12-22 @ 07:11]

## 2022-05-13 NOTE — PROGRESS NOTE ADULT - ASSESSMENT
Impression:  This is a 67 year old gentleman pmhx CAD s/p MI/PCI/CABG, hx TIA, afib on rivaroxaban, HTN, DM2, PVD, gout, L CEA 2014, and CKD who presented to [facility] 4/11/22 with ANT, tremors, confusion and lethargy.  CT head no acute changes.  S/p initiation of hemodialysis.  Mental status has improved dramatically.  Pt denies any headaches, no slurred speech, no hemiparesis.  He has chronic gout with bilateral finger swelling, pain.  He also has chronic neuropathy.  Both legs are weak.      Persistent encephalopathy prompted re-consultation on 5/10/22.  MRI brain was ordered and revealed small acute infarctions in bilateral posterior centrum semiovale and left corona radiata and left posterior periventricular white matter.  Carotid ultrasound was performed showing new occlusion of the left internal carotid artery, along with greater than 70% stenosis involving the distal right common carotid artery, and mild to moderate flow-limiting stenosis is seen at the left external carotid artery.  Vascular surgery has been consulted and is recommending CTA.  He continues to be encephalopathic and lethargic.      Diagnosis and Recommendations:  Small scattered infarcts  - doubt driving his encephalopathy but may well be contributing  - in setting of atrial fibrillation may well be cardioembolic, continue anticoagulation as per cardiology  - given left ICA occlusion and 70% stenosis of right common carotid appreciate vascular surgery consultation and agree with their recommendation for a CTA, however the CTA was not optimal and thus can not be used to confirm the doppler findings  - ECHO in February 2022 did not reveal severe cardiomyopathy, vegetation, or LV thrombus.  Consider repeating to confirm.  - HgA1c of 10.1 in February 2022 may be driving stroke risk.  Goal is < 7.  Will repeat   - Will check lipid panel.  Continue high-intensity statin therapy for now.    - PT/OT/SLP  -Recc is for contrast mra of neck, need to clear with nephrology due to risk of contrast reactions in those with elevated creatinine  await vasc followup      Lethargy and encephalopathy  - likely multifactorial, cerebral infarcts may be contributing but doubt they are driving the clinical picture  - recheck TSH along with a free T4  - B12 for completeness   - Continue HD as per renal  - Continue to manage BUN to minimize uremic encephalopathy; currently well managed but effects can sometimes persist  - Toxic/metabolic/infectious workup as per primary team   - consider workup for MOR, can potentially be done inpatient vs outpatient, he is generally more confused in the mornings which can sometimes be seen in setting of sleep apnea    In greater than 30 minutes visit, explained to rody on the phone re update on testing results, that even if find potential high grade stenosis that can be treated, that will then have to make a determination whether the risks of interventions in light of medical issues and potential lifespan outweights the benefits.

## 2022-05-13 NOTE — PROGRESS NOTE ADULT - SUBJECTIVE AND OBJECTIVE BOX
Admitting Diagnosis:  Chronic kidney disease [N18.9]  CHRONIC KIDNEY DISEASE, UNSPECIFIED    Background:  This is a 67 year old gentleman pmhx CAD s/p MI/PCI/CABG, hx TIA, afib on rivaroxaban, HTN, DM2, PVD, gout, L CEA 2014, and CKD who presented to [facility] 4/11/22 with ANT, tremors, confusion and lethargy.  CT head no acute changes.  S/p initiation of hemodialysis.  Mental status has improved dramatically.  Pt denies any headaches, no slurred speech, no hemiparesis.  He has chronic gout with bilateral finger swelling, pain.  He also has chronic neuropathy.  Both legs are weak.      Interval Hx:  persistent encephalopathy prompted re-consultation on 5/10/22.  MRI brain was ordered and revealed small acute infarctions in bilateral posterior centrum semiovale and left corona radiata and left posterior periventricular white matter.  Carotid ultrasound was performed showing new occlusion of the left internal carotid artery, along with greater than 70% stenosis involving the distal right common carotid artery, and mild to moderate flow-limiting stenosis is seen at the left external carotid artery.  Vascular surgery has been consulted and is recommending CTA.    He is without new complaints    ******    Past Medical History:  HTN (hypertension), benign [401.1]  HLD (hyperlipidemia) [272.4]  DM type 2 (diabetes mellitus, type 2) [250.00]  TIA (transient ischemic attack) [435.9]  Atrial fibrillation [427.31]  MI (myocardial infarction) [410.90]  circa 2014 and 2022.  CAD (coronary artery disease) [414.00]  Neuropathy [G62.9]  Stage 3 chronic kidney disease [N18.30]  2019 novel coronavirus disease (COVID-19) [U07.1]  12/2021.  Received the COVID-19 vaccine x 2 as of April 2022.    Past Surgical History:  Status post angioplasty with stent [V45.82]  LULU x 3 2/7/2014  S/P carotid endarterectomy [Z98.89]  left  S/P CABG (coronary artery bypass graft) [Z95.1]        Social History:  No toxic habits    Family History:  FAMILY HISTORY:  Family history of heart disease          ROS: as per HPI.        Medications:  allopurinol 100 milliGRAM(s) Oral daily  apixaban 5 milliGRAM(s) Oral two times a day  aspirin enteric coated 81 milliGRAM(s) Oral daily  atorvastatin 40 milliGRAM(s) Oral at bedtime  bisacodyl 5 milliGRAM(s) Oral every 12 hours PRN  chlorhexidine 4% Liquid 1 Application(s) Topical <User Schedule>  dextrose 50% Injectable 25 Gram(s) IV Push once  dextrose 50% Injectable 25 Gram(s) IV Push once  dextrose 50% Injectable 12.5 Gram(s) IV Push once  dextrose Oral Gel 15 Gram(s) Oral once PRN  diltiazem    milliGRAM(s) Oral daily  hydrALAZINE 25 milliGRAM(s) Oral three times a day  insulin lispro (ADMELOG) corrective regimen sliding scale   SubCutaneous three times a day before meals  insulin lispro (ADMELOG) corrective regimen sliding scale   SubCutaneous at bedtime  lidocaine   4% Patch 1 Patch Transdermal daily  metoprolol succinate  milliGRAM(s) Oral daily  mupirocin 2% Ointment 1 Application(s) Both Nostrils two times a day  polyethylene glycol 3350 17 Gram(s) Oral two times a day  povidone iodine 10% Solution 1 Application(s) Topical daily  QUEtiapine 25 milliGRAM(s) Oral at bedtime  senna 2 Tablet(s) Oral at bedtime  sevelamer carbonate Powder 1600 milliGRAM(s) Oral three times a day with meals  sodium chloride 0.9% lock flush 10 milliLiter(s) IV Push every 1 hour PRN      Labs:  CBC Full  -  ( 12 May 2022 17:15 )  WBC Count : 7.60 K/uL  RBC Count : 3.76 M/uL  Hemoglobin : 8.9 g/dL  Hematocrit : 30.5 %  Platelet Count - Automated : 266 K/uL  Mean Cell Volume : 81.1 fl  Mean Cell Hemoglobin : 23.7 pg  Mean Cell Hemoglobin Concentration : 29.2 gm/dL  Auto Neutrophil # : 5.10 K/uL  Auto Lymphocyte # : 1.53 K/uL  Auto Monocyte # : 0.78 K/uL  Auto Eosinophil # : 0.11 K/uL  Auto Basophil # : 0.04 K/uL  Auto Neutrophil % : 67.2 %  Auto Lymphocyte % : 20.1 %  Auto Monocyte % : 10.3 %  Auto Eosinophil % : 1.4 %  Auto Basophil % : 0.5 %    05-12    134<L>  |  95<L>  |  37<H>  ----------------------------<  188<H>  4.9   |  26  |  7.34<H>    Ca    8.8      12 May 2022 17:15      CAPILLARY BLOOD GLUCOSE      POCT Blood Glucose.: 175 mg/dL (13 May 2022 08:04)  POCT Blood Glucose.: 138 mg/dL (12 May 2022 22:07)  POCT Blood Glucose.: 130 mg/dL (12 May 2022 18:59)  POCT Blood Glucose.: 230 mg/dL (12 May 2022 12:40)          Vitamin B12: 467 pg/mL [419 - 0242] 05-12-22 @ 07:11  Folate: -- 05-12-22 @ 07:11  Thyroid Function: -- 05-12-22 @ 07:11  Ammonia: -- 05-12-22 @ 07:11  Dilantin: -- 05-12-22 @ 07:11      Vitals:  Vital Signs Last 24 Hrs  T(C): 36.7 (13 May 2022 04:57), Max: 36.7 (13 May 2022 04:57)  T(F): 98.1 (13 May 2022 04:57), Max: 98.1 (13 May 2022 04:57)  HR: 95 (13 May 2022 04:57) (90 - 122)  BP: 115/60 (13 May 2022 04:57) (115/60 - 157/59)  BP(mean): --  RR: 18 (13 May 2022 04:57) (18 - 18)  SpO2: 96% (13 May 2022 04:57) (95% - 99%)      NEUROLOGICAL EXAM:    Mental status: awake alert, and in no apparent distress. Oriented to person, knows he is in a hospital but not which one, year 2000 something, when asked president says who cares and would not try to guess   Language intact.  Attention impaired.      Cranial Nerves: Pupils were equal, round, reactive to light. Extraocular movements were intact. B BTT Facial sensation was intact to light touch. There was no facial asymmetry. The palate was upgoing symmetrically and tongue was midline. Hearing acuity was intact to finger rub AU. Shoulder shrug was full bilaterally    Motor exam: Bulk and tone were normal. moves all ext.  Fine finger movements were symmetric. There was bilateral symmetric pronator drift    Reflexes: DTRs depressed, flexor plantars.     Sensation: Intact to noxious, seems intact to light touch, due to decreased attention did not assess other modalities     Coordination: no gross dysmetria    Gait: deferred    NIHSS: 13    Imaging:    ACC: [id number] EXAM:  CT BRAIN                        PROCEDURE DATE:  04/09/2022      INTERPRETATION:  CLINICAL INFORMATION:  Altered mental status, on   anticoagulation .    TECHNIQUE: Multiple contiguous axial images were acquired from the   skullbase to the vertex without the administration of intravenous   contrast.  Coronal and sagittal reformations were made.    COMPARISON: CT head 10/21/2021, brain MRI 02/23/2014.    FINDINGS:    Scattered lacunar infarcts in the bilateral cerebralhemispheres are   grossly stable compared to the 10/21/2021 head CT. No new additional   infarcts are noted over the time interval.    No acute intracranial hemorrhage, mass effect, or midline shift. The   brain demonstrates periventricular hypoattenuation, nonspecific in   etiology but likely representing chronic microvascular ischemic changes.    No abnormal extra-axial fluid collections are present.    The ventricles and sulci demonstrate age appropriate involutional   changes.  The basal cisterns are patent.    The orbits are unremarkable. The paranasal sinuses are clear. The mastoid   air cells are clear. The calvarium is intact.    IMPRESSION:    No acute intracranial abnormality is noted. If the patient has new and   persistent symptoms, consider short interval follow-up head CT or brain   MRI.    --- End of Report ---    GATITO VILLARREAL MD; Resident Radiologist  This document has been electronically signed.  JESSE CORONA MD; Attending Radiologist  This document has been electronically signed. Apr 9 2022  2:00PM        ACC: [id number] EXAM:  MR BRAIN                          PROCEDURE DATE:  05/11/2022          INTERPRETATION:  MR brain  without gadolinium    CLINICAL INFORMATION: Stroke.    TECHNIQUE: Limited Sagittal T1-weighted images, axial FLAIR images, and   axial diffusion weighted images of the brain were obtained.  Patient was   unable to hold still for remaining sequences.    FINDINGS:   MRI dated 02/23/2014 available for review.    The brain demonstrates small acute infarctions in the BILATERAL posterior   centrum semiovale and LEFT corona radiata and LEFT posterior   periventricular white matter with restricted diffusion. No intracranial   hemorrhage is recognized. No mass effect is found in the brain.    The ventricles, sulci and basal cisterns show mild global atrophy most   prominent within the BILATERAL cerebellum.    The vertebral and internal carotid arteries demonstrate expected flow   voids indicating their patency.    The orbits are unremarkable.  The paranasal sinuses are clear.  The nasal   cavity appears intact.  The nasopharynx is symmetric.  The central skull   base and temporal bones are intact.  The calvarium appears unremarkable.    IMPRESSION: Small acute infarctions in the BILATERAL posterior centrum   semiovale and LEFT corona radiata and LEFT posterior periventricular   white matter with restricted diffusion.   mild global atrophy most   prominent within the BILATERAL cerebellum.    --- End of Report ---            JESÚS PADGETT MD; Attending Radiologist  This document has been electronically signed. May 11 2022  5:47PM        Patient name: DYLAN DEL CASTILLO  YOB: 1955   Age: 67 (M)   MR#: [id number]  Study Date: 2/22/2022  [facility]  Study quality: Technically difficult  Referring Physician: Mendoza Corral MD  Blood Pressure: 152/82 mmHg  Height: 180 cm  Weight: 136 kg  BSA: 2.5 m2  ------------------------------------------------------------------------  PROCEDURE: Transthoracic echocardiogram with 2-D, M-Mode  and complete spectral and color flow Doppler. Verbal  consent was obtained for injection of  Ultrasonic Enhancing  Agent following a discussion of risks and benefits.  Following intravenous injection of Ultrasonic Enhancing  Agent , harmonic imaging was performed.  INDICATION: Chest pain, unspecified (R07.9)  ------------------------------------------------------------------------  Dimensions:    Normal Values:  LA:     4.4    2.0 - 4.0 cm  Ao:     4.0    2.0 - 3.8 cm  SEPTUM: 1.1    0.6 - 1.2 cm  PWT:    0.9    0.6 - 1.1 cm  LVIDd:  5.0    3.0 - 5.6 cm  LVIDs:  3.5    1.8 - 4.0 cm  Derived variables:  LVMI: 73 g/m2  RWT: 0.36  EF (Visual Estimate): 60 %  Doppler Peak Velocity (m/sec): AoV=1.5 TV=2.2  ------------------------------------------------------------------------  Observations:  Mitral Valve: Normal mitral valve.  Aortic Valve/Aorta: Calcified aortic valve with normal  opening. Minimal aortic regurgitation.  Normal aortic root size.  Left Atrium: Mildly dilated left atrium.  Left Ventricle: Endocardial visualization enhanced with  intravenous injection of Ultrasonic Enhancing Agent  (Definity).  Normal left ventricular internal dimensions and wall  thickness.  Normal left ventricular systolic function. No segmental  wall motion abnormalities.  Right Heart: Normal right atrium. Normal right ventricular  size and function.  Normal tricuspid valve. Mild tricuspid regurgitation.  Normal pulmonic valve. Mild pulmonic regurgitation.  Pericardium/Pleura: Normal pericardium with no pericardial  effusion.  Hemodynamic: No evidence of pulmonary hypertension.  ------------------------------------------------------------------------  Conclusions:  Endocardial visualization enhanced with intravenous  injection of Ultrasonic Enhancing Agent (Definity).  Normal left ventricular systolic function. No segmental  wall motion abnormalities.  ------------------------------------------------------------------------  Confirmed on  2/22/2022 - 16:17:39 by Ishan Ackerman MD, FASE  ------------------------------------------------------------------------    < from: CT Angio Head w/ IV Cont (05.12.22 @ 13:34) >    ACC: [id number] EXAM:  CT ANGIO NECK (W)AW IC                        ACC: [id number] EXAM:  CT ANGIO BRAIN (W)AW IC                          PROCEDURE DATE:  05/12/2022          INTERPRETATION:  CT angiography of the brain and neck    CLINICAL INDICATION: Recent infarcts. Carotid stenosis.    TECHNIQUE: Direct axial CT scanning of the brain and neck was obtained   from the vertex to the level of the clavicular heads after the dynamic   intravenous injection of 44 cc of Omnipaque 300. 0 cc were discarded.   Sagittal and coronal maximum intensity projection reformats were   provided.  Three-dimensional reconstructions were performed by the   radiologist using the [employer] workstation.    Additionally, noncontrast axial CT scanning of the brain was obtained   from the skull base to the vertex. Sagittal and coronal reformats were   provided.    COMPARISON: MRA neck 10/24/2021    FINDINGS:    CT brain:    Previously seen acute infarcts in the bilateral cerebral hemispheres are   redemonstrated. No CT evidence for hemorrhagic transformation.    No hydrocephalus, midline shift, or effacement of basal cisterns.    No acute intracranial hemorrhage or vasogenic edema.    Mild to moderate white matter microvascular ischemic disease.    CTA brain:    Limited by relatively decreased opacification of the arteries.    Multifocal narrowing of the right skull base ICA due to calcified plaque,   the degree of which is not well evaluated.    Normal flow-related enhancement of the supraclinoid right ICA.    Lack of flow related enhancement of the petrous, precavernous, and   cavernous left ICA. Reconstitution of the vessel at its supraclinoid   segment.    Otherwise no flow-limiting stenosis.    Normal flow-related enhancement of the bilateral anterior, middle, and   posterior cerebral arteries.    Normal flow-related enhancement of the intradural vertebral arteries and   basilar artery.    No vascular aneurysm or AVM.    CTA neck:    Stenoses described in this report are on the basis of NASCET criteria.    Study is significantly limited by relatively decreased opacification of   the arteries.    Short segment stenosis of the mid left common carotid artery due to the   presence of partially calcified plaque is poorly evaluated. Flow-related   enhancement of the more proximal and distal left common carotid artery is   noted.    Long segment stenosis of the mid and distal right common carotid artery   due to the presence of partially calcified plaque is poorly evaluated.    Rapidly progressive stenosis of the left internal carotid artery   beginning at its origin. No flow related enhancement of the vessel beyond   its origin.    Normal flow-related enhancement of the bilateral vertebral arteries.    No gross evidence for acute arterial dissection.    IMPRESSION:    CTA brain:  Limited by relatively decreased opacification of the arteries.    Multifocal narrowing of the right skull base ICA due to calcified plaque,   the degree of which is not well evaluated.    Lack of flow related enhancement of the petrous, precavernous, and   cavernous left ICA. Reconstitution of the vessel at its supraclinoid   segment.    Otherwise no flow-limiting stenosis.    No vascular aneurysm or AVM.    CTA neck:  Limited by relatively decreased opacification of the arteries.    Short segment stenosis of the mid left common carotid artery due to the   presence of partially calcified plaque is poorly evaluated. Flow-related   enhancement of the more proximal and distal left common carotid artery is   noted.    Long segment stenosis of the mid and distal right common carotid artery   due to the presence of partially calcified plaque is poorly evaluated.    Rapidly progressive stenosis of the left internal carotid artery   beginning at its origin. No flow related enhancement of the vessel beyond   its origin.    Recommend correlation with contrast-enhanced MRI of the neck for further   evaluation.    --- End of Report ---            MIGUEL ANGEL CARRILLO MD; Attending Radiologist  This document has been electronically signed. May 12 2022  2:47PM    < end of copied text >

## 2022-05-13 NOTE — PROGRESS NOTE ADULT - SUBJECTIVE AND OBJECTIVE BOX
CARDIOLOGY FOLLOW UP - Dr. Mazariegos  DATE OF SERVICE: 5/13/22    CC agitated on exam   no cp or son       REVIEW OF SYSTEMS:  CONSTITUTIONAL: No fever, weight loss, or fatigue  RESPIRATORY: No cough, wheezing, chills or hemoptysis; No Shortness of Breath  CARDIOVASCULAR: No chest pain, palpitations, passing out, dizziness, or leg swelling  GASTROINTESTINAL: No abdominal or epigastric pain. No nausea, vomiting, or hematemesis; No diarrhea or constipation. No melena or hematochezia.  VASCULAR: No edema     PHYSICAL EXAM:  T(C): 36.7 (05-13-22 @ 04:57), Max: 36.7 (05-13-22 @ 04:57)  HR: 95 (05-13-22 @ 04:57) (90 - 122)  BP: 115/60 (05-13-22 @ 04:57) (115/60 - 157/59)  RR: 18 (05-13-22 @ 04:57) (18 - 18)  SpO2: 96% (05-13-22 @ 04:57) (95% - 99%)  Wt(kg): --  I&O's Summary      Appearance: Normal	  Cardiovascular: Normal S1 S2 irreg   Respiratory: Lungs clear to auscultation	  Gastrointestinal:  Soft, Non-tender, + BS	  Extremities: Normal range of motion, No clubbing, cyanosis or edema      Home Medications:  allopurinol 100 mg oral tablet: 1 tab(s) orally once a day (03 Apr 2022 06:34)  aspirin 81 mg oral delayed release tablet: 1 tab(s) orally once a day (03 Apr 2022 06:34)  bumetanide 2 mg oral tablet: 1 tab(s) orally once a day (03 Apr 2022 06:34)  insulin glargine 100 units/mL subcutaneous solution: 25 unit(s) subcutaneous once a day (at bedtime) (03 Apr 2022 06:34)  metOLazone 5 mg oral tablet: 1 tab(s) orally 3 times a week (03 Apr 2022 06:34)  metoprolol succinate 50 mg oral tablet, extended release: 2 tab(s) orally once a day (03 Apr 2022 06:34)  NovoLOG 100 units/mL subcutaneous solution: subcutaneous 4 times a day (before meals and at bedtime) (03 Apr 2022 06:34)  NovoLOG FlexPen 100 units/mL injectable solution: 10 unit(s) subcutaneous 3 times a day (03 Apr 2022 06:34)  oxycodone-acetaminophen 5 mg-325 mg oral tablet: 1 tab(s) orally 2 times a day (03 Apr 2022 06:34)  Pradaxa 150 mg oral capsule: 1 cap(s) orally 2 times a day (03 Apr 2022 06:34)  pregabalin 100 mg oral capsule: 1 cap(s) orally 3 times a day (03 Apr 2022 06:34)      MEDICATIONS  (STANDING):  allopurinol 100 milliGRAM(s) Oral daily  apixaban 5 milliGRAM(s) Oral two times a day  aspirin enteric coated 81 milliGRAM(s) Oral daily  atorvastatin 40 milliGRAM(s) Oral at bedtime  chlorhexidine 4% Liquid 1 Application(s) Topical <User Schedule>  dextrose 50% Injectable 25 Gram(s) IV Push once  dextrose 50% Injectable 25 Gram(s) IV Push once  dextrose 50% Injectable 12.5 Gram(s) IV Push once  diltiazem    milliGRAM(s) Oral daily  hydrALAZINE 25 milliGRAM(s) Oral three times a day  insulin lispro (ADMELOG) corrective regimen sliding scale   SubCutaneous three times a day before meals  insulin lispro (ADMELOG) corrective regimen sliding scale   SubCutaneous at bedtime  lidocaine   4% Patch 1 Patch Transdermal daily  metoprolol succinate  milliGRAM(s) Oral daily  mupirocin 2% Ointment 1 Application(s) Both Nostrils two times a day  polyethylene glycol 3350 17 Gram(s) Oral two times a day  povidone iodine 10% Solution 1 Application(s) Topical daily  QUEtiapine 25 milliGRAM(s) Oral at bedtime  senna 2 Tablet(s) Oral at bedtime  sevelamer carbonate Powder 1600 milliGRAM(s) Oral three times a day with meals      TELEMETRY: afib hr 75	    ECG:  	  RADIOLOGY:   < from: CT Angio Head / NECK w/ IV Cont (05.12.22 @ 13:34) >  IMPRESSION:    CTA brain:  Limited by relatively decreased opacification of the arteries.    Multifocal narrowing of the right skull base ICA due to calcified plaque,   the degree of which is not well evaluated.    Lack of flow related enhancement of the petrous, precavernous, and   cavernous left ICA. Reconstitution of the vessel at its supraclinoid   segment.    Otherwise no flow-limiting stenosis.    No vascular aneurysm or AVM.    CTA neck:  Limited by relatively decreased opacification of the arteries.    Short segment stenosis of the mid left common carotid artery due to the   presence of partially calcified plaque is poorly evaluated. Flow-related   enhancement of the more proximal and distal left common carotid artery is   noted.    Long segment stenosis of the mid and distal right common carotid artery   due to the presence of partially calcified plaque is poorly evaluated.    Rapidly progressive stenosis of the left internal carotid artery   beginning at its origin. No flow related enhancement of the vessel beyond   its origin.    Recommend correlation with contrast-enhanced MRI of the neck for further   evaluation.    --- End of Report ---      < end of copied text >    DIAGNOSTIC TESTING:  [ ] Echocardiogram:  [ ]  Catheterization:  [ ] Stress Test:    OTHER: 	    LABS:	 	                            8.9    7.60  )-----------( 266      ( 12 May 2022 17:15 )             30.5     05-12    134<L>  |  95<L>  |  37<H>  ----------------------------<  188<H>  4.9   |  26  |  7.34<H>    Ca    8.8      12 May 2022 17:15

## 2022-05-13 NOTE — PROGRESS NOTE ADULT - PROBLEM SELECTOR PLAN 3
MR head with multiple small acute infarctions  -F/u repeat echo  -Ongoing w/u for carotid stenosis   -Neuro f/u

## 2022-05-13 NOTE — PROGRESS NOTE ADULT - SUBJECTIVE AND OBJECTIVE BOX
Patient is a 67y old  Male who presents with a chief complaint of Decreased urine output for the past week, leg oedema (13 May 2022 18:31)      Vascular Surgery Attending Progress Note    Interval HPI: pt w/o new c/o     Medications:  allopurinol 100 milliGRAM(s) Oral daily  apixaban 5 milliGRAM(s) Oral two times a day  aspirin enteric coated 81 milliGRAM(s) Oral daily  atorvastatin 40 milliGRAM(s) Oral at bedtime  bisacodyl 5 milliGRAM(s) Oral every 12 hours PRN  chlorhexidine 4% Liquid 1 Application(s) Topical <User Schedule>  dextrose 50% Injectable 25 Gram(s) IV Push once  dextrose 50% Injectable 25 Gram(s) IV Push once  dextrose 50% Injectable 12.5 Gram(s) IV Push once  dextrose Oral Gel 15 Gram(s) Oral once PRN  diltiazem    milliGRAM(s) Oral daily  hydrALAZINE 25 milliGRAM(s) Oral three times a day  insulin lispro (ADMELOG) corrective regimen sliding scale   SubCutaneous three times a day before meals  insulin lispro (ADMELOG) corrective regimen sliding scale   SubCutaneous at bedtime  lidocaine   4% Patch 1 Patch Transdermal daily  metoprolol succinate  milliGRAM(s) Oral daily  mupirocin 2% Ointment 1 Application(s) Both Nostrils two times a day  polyethylene glycol 3350 17 Gram(s) Oral two times a day  povidone iodine 10% Solution 1 Application(s) Topical daily  QUEtiapine 25 milliGRAM(s) Oral at bedtime  senna 2 Tablet(s) Oral at bedtime  sevelamer carbonate Powder 1600 milliGRAM(s) Oral three times a day with meals  sodium chloride 0.9% lock flush 10 milliLiter(s) IV Push every 1 hour PRN      Vital Signs Last 24 Hrs  T(C): 36.4 (13 May 2022 20:24), Max: 36.7 (13 May 2022 04:57)  T(F): 97.5 (13 May 2022 20:24), Max: 98.1 (13 May 2022 04:57)  HR: 91 (13 May 2022 20:24) (85 - 122)  BP: 131/57 (13 May 2022 20:24) (115/60 - 148/67)  BP(mean): --  RR: 18 (13 May 2022 20:24) (18 - 18)  SpO2: 97% (13 May 2022 20:24) (96% - 98%)  I&O's Summary      Physical Exam:  Neuro  A&Ox3 VSS  Vascular:  no acute changes     LABS:                        8.9    7.60  )-----------( 266      ( 12 May 2022 17:15 )             30.5     05-12    134<L>  |  95<L>  |  37<H>  ----------------------------<  188<H>  4.9   |  26  |  7.34<H>    Ca    8.8      12 May 2022 17:15    MRI Brain reviewed w pt and wife   CTA of the neck reviewed w pt and wife       UMAIR MCGRAW MD  [id number] [facility] [phone]

## 2022-05-13 NOTE — PROGRESS NOTE ADULT - PROBLEM SELECTOR PLAN 2
I Edward Pinto MD have personally  seen and examined the patient today and have noted the findings and formulated the plan of care.  I Edward Pinto MD have personally seen and examined the patient at bedside today at 9pm

## 2022-05-13 NOTE — PROVIDER CONTACT NOTE (OTHER) - ASSESSMENT
Pt denies chest pain, palpitations, lightheadedness. /59, HR fluctuating 110 to 130s up to 140s, however not sustaining past 130. Pt denies chest pain, palpitations, lightheadedness. /59, HR fluctuating 110 to 130s, however not sustaining past 130.

## 2022-05-13 NOTE — PROGRESS NOTE ADULT - SUBJECTIVE AND OBJECTIVE BOX
Follow-up Pulm Progress Note    No new respiratory events overnight.  Denies SOB/CP.     Medications:  MEDICATIONS  (STANDING):  allopurinol 100 milliGRAM(s) Oral daily  apixaban 5 milliGRAM(s) Oral two times a day  aspirin enteric coated 81 milliGRAM(s) Oral daily  atorvastatin 40 milliGRAM(s) Oral at bedtime  chlorhexidine 4% Liquid 1 Application(s) Topical <User Schedule>  dextrose 50% Injectable 25 Gram(s) IV Push once  dextrose 50% Injectable 12.5 Gram(s) IV Push once  dextrose 50% Injectable 25 Gram(s) IV Push once  diltiazem    milliGRAM(s) Oral daily  hydrALAZINE 25 milliGRAM(s) Oral three times a day  insulin lispro (ADMELOG) corrective regimen sliding scale   SubCutaneous at bedtime  insulin lispro (ADMELOG) corrective regimen sliding scale   SubCutaneous three times a day before meals  lidocaine   4% Patch 1 Patch Transdermal daily  metoprolol succinate  milliGRAM(s) Oral daily  mupirocin 2% Ointment 1 Application(s) Both Nostrils two times a day  polyethylene glycol 3350 17 Gram(s) Oral two times a day  povidone iodine 10% Solution 1 Application(s) Topical daily  QUEtiapine 25 milliGRAM(s) Oral at bedtime  senna 2 Tablet(s) Oral at bedtime  sevelamer carbonate Powder 1600 milliGRAM(s) Oral three times a day with meals    MEDICATIONS  (PRN):  bisacodyl 5 milliGRAM(s) Oral every 12 hours PRN Constipation  dextrose Oral Gel 15 Gram(s) Oral once PRN Blood Glucose LESS THAN 70 milliGRAM(s)/deciliter  sodium chloride 0.9% lock flush 10 milliLiter(s) IV Push every 1 hour PRN Pre/post blood products, medications, blood draw, and to maintain line patency          Vital Signs Last 24 Hrs  T(C): 36.7 (13 May 2022 04:57), Max: 36.7 (13 May 2022 04:57)  T(F): 98.1 (13 May 2022 04:57), Max: 98.1 (13 May 2022 04:57)  HR: 95 (13 May 2022 04:57) (90 - 122)  BP: 115/60 (13 May 2022 04:57) (115/60 - 157/59)  BP(mean): --  RR: 18 (13 May 2022 04:57) (18 - 18)  SpO2: 96% (13 May 2022 04:57) (95% - 99%)              LABS:                        8.9    7.60  )-----------( 266      ( 12 May 2022 17:15 )             30.5     05-12    134<L>  |  95<L>  |  37<H>  ----------------------------<  188<H>  4.9   |  26  |  7.34<H>    Ca    8.8      12 May 2022 17:15            CAPILLARY BLOOD GLUCOSE      POCT Blood Glucose.: 175 mg/dL (13 May 2022 08:04)              Physical Examination:  PULM: Clear to auscultation bilaterally, no significant sputum production  CVS: S1, S2 heard    RADIOLOGY REVIEWED  CXR 5/12: small L effusion    CT chest: < from: CT Chest No Cont (04.04.22 @ 16:52) >  FINDINGS:    AIRWAYS, LUNGS, PLEURA: Tracheal secretions. Small left greater than   right pleural effusions increased compared to 2/19/2022. Right-sided   pleural thickening. Lingular and left greater than right basilar   opacification, likely atelectasis.    MEDIASTINUM: Cardiomegaly. No pericardial effusion. Thoracic aorta normal   caliber.  No large mediastinal lymph nodes.    IMAGED ABDOMEN: Nonspecific perinephric stranding.    SOFT TISSUES: Unremarkable.    BONES: Unremarkable.    IMPRESSION:.    Small left greater than right bilateral pleural effusions increased in   size compared to 2/19/2022.    Lingular and left greater than right basilar opacification, likely   atelectasis.    --- End of Report ---    < end of copied text >

## 2022-05-13 NOTE — PROGRESS NOTE ADULT - ASSESSMENT
67 year old gentleman with PMH of CAD, NSTEMI s/p 3 stents in 2014 (stents to LAD, proximal and distal LCx), ischemic cardiomyopathy, HTN for 10 years, T2DM for 26 years c/b peripheral neuropathy, CVA, TIA, Afib on Pradaxa, chronic RLE wound, L carotid stenosis s/p endarterectomy (2014) just recently admitted  to the [facility] on 2/19/2022 with acute onset chest pain for one day found to have an NSTEMI, CAD, s/p PCI in setting of increased AF rates off bb for 3 days and resolved chest pain, his CKMB peaked and Echo noted with EF 60%,normal LV function, mild TR, mild MI. He was s/p [facility] with 85% proximal LAD s/p balloon angioplasty and LULU. He presented to [facility] ED with decreased urine output over the week. [facility]. Stevens went to [facility] for hematuria and was started  on Nitrofurantoin. Pt is still taking Nitrofurantoin w/ 5 days left. He came to the ED due to worsening symptoms. Pt reports having a similar incident 4-5 years ago that resolved spontaneously. He reports increased leg edema Dyspnea worse on exertion. his baseline Serum creatinine is in the 2.0 to 2.3 mg/dl range. ANT with serum creatinine of 8.29 with bun 144 and k 5.5      1- ANT on ckd III ---> ESRD likely   2- chf chronic   3- hyperphosphatemia /shpt   4- anemia   5- hyperlipidemia   6- HTN /a fib  7- TIA hx   8- hx of carotid stenosis      pt with cva as well. is now scheduled for MRA. will stand at risk of nephrogenic systemic fibrosis, therefore to use group II Kevin only and please coordinate MRA to be immediately before dialysis   renvela 2 tab with meals   anemia - epogen [id number] Units TIW with HD.  PT  seroquel 25 mg daily

## 2022-05-13 NOTE — PROGRESS NOTE ADULT - ASSESSMENT
67M PMH of CAD, NSTEMI s/p 3 stents in 2014 (stents to LAD, proximal and distal LCx), ischemic cardiomyopathy, HTN for 10 years, T2DM for 26 years c/b peripheral neuropathy, CVA, TIA, Afib on Pradaxa, chronic RLE wound, L carotid stenosis s/p endarterectomy (2014) just recently admitted  to the [facility] on 2/19/2022 with acute onset chest pain for one day found to have an NSTEMI, CAD, s/p PCI in setting of increased AF rates off bb for 3 days and resolved chest pain, his CKMB peaked and Echo noted with EF 60%,normal LV function, mild TR, mild RI. He was s/p [facility] with 85% proximal LAD s/p balloon angioplasty and LULU. He presented to [facility] ED with decreased urine output over the week. Carotid duplex showed Calcified atherosclerotic disease seen throughout the carotid systems. There is new occlusion of the left internal carotid artery. There is greater than 70% stenosis involving the distal right common carotid artery as was seen previously. Mild to moderate flow-limiting stenosis is seen at the left external carotid artery. Vascular surgery consulted for carotid stenosis. Pt currently denies vision changes, dizziness, lightheadedness or any other symptoms at this point in time.     PLAN:  -recommend  mra of the neck to eval  rt ica stenosis severity  neurology  input reviewed  will follow

## 2022-05-14 NOTE — PROGRESS NOTE ADULT - SUBJECTIVE AND OBJECTIVE BOX
Admitting Diagnosis:  Chronic kidney disease [N18.9]  CHRONIC KIDNEY DISEASE, UNSPECIFIED    Background:  This is a 67 year old gentleman pmhx CAD s/p MI/PCI/CABG, hx TIA, afib on rivaroxaban, HTN, DM2, PVD, gout, L CEA 2014, and CKD who presented to [facility] 4/11/22 with ANT, tremors, confusion and lethargy.  CT head no acute changes.  S/p initiation of hemodialysis.  Mental status has improved dramatically.  Pt denies any headaches, no slurred speech, no hemiparesis.  He has chronic gout with bilateral finger swelling, pain.  He also has chronic neuropathy.  Both legs are weak.      Interval Hx:  pt seen at HD.  no specific c/o    Past Medical History:  HTN (hypertension), benign [401.1]  HLD (hyperlipidemia) [272.4]  DM type 2 (diabetes mellitus, type 2) [250.00]  TIA (transient ischemic attack) [435.9]  Atrial fibrillation [427.31]  MI (myocardial infarction) [410.90]  circa 2014 and 2022.  CAD (coronary artery disease) [414.00]  Neuropathy [G62.9]  Stage 3 chronic kidney disease [N18.30]  2019 novel coronavirus disease (COVID-19) [U07.1]  12/2021.  Received the COVID-19 vaccine x 2 as of April 2022.    Past Surgical History:  Status post angioplasty with stent [V45.82]  LULU x 3 2/7/2014  S/P carotid endarterectomy [Z98.89]  left  S/P CABG (coronary artery bypass graft) [Z95.1]        Social History:  No toxic habits    Family History:  FAMILY HISTORY:  Family history of heart disease          ROS: as per HPI.        Medications:  allopurinol 100 milliGRAM(s) Oral daily  apixaban 5 milliGRAM(s) Oral two times a day  aspirin enteric coated 81 milliGRAM(s) Oral daily  atorvastatin 40 milliGRAM(s) Oral at bedtime  bisacodyl 5 milliGRAM(s) Oral every 12 hours PRN  chlorhexidine 4% Liquid 1 Application(s) Topical <User Schedule>  dextrose 50% Injectable 25 Gram(s) IV Push once  dextrose 50% Injectable 25 Gram(s) IV Push once  dextrose 50% Injectable 12.5 Gram(s) IV Push once  dextrose Oral Gel 15 Gram(s) Oral once PRN  diltiazem    milliGRAM(s) Oral daily  hydrALAZINE 25 milliGRAM(s) Oral three times a day  insulin lispro (ADMELOG) corrective regimen sliding scale   SubCutaneous three times a day before meals  insulin lispro (ADMELOG) corrective regimen sliding scale   SubCutaneous at bedtime  lidocaine   4% Patch 1 Patch Transdermal daily  metoprolol succinate  milliGRAM(s) Oral daily  mupirocin 2% Ointment 1 Application(s) Both Nostrils two times a day  polyethylene glycol 3350 17 Gram(s) Oral two times a day  povidone iodine 10% Solution 1 Application(s) Topical daily  QUEtiapine 25 milliGRAM(s) Oral at bedtime  senna 2 Tablet(s) Oral at bedtime  sevelamer carbonate Powder 1600 milliGRAM(s) Oral three times a day with meals  sodium chloride 0.9% lock flush 10 milliLiter(s) IV Push every 1 hour PRN      Labs:  CBC Full  -  ( 12 May 2022 17:15 )  WBC Count : 7.60 K/uL  RBC Count : 3.76 M/uL  Hemoglobin : 8.9 g/dL  Hematocrit : 30.5 %  Platelet Count - Automated : 266 K/uL  Mean Cell Volume : 81.1 fl  Mean Cell Hemoglobin : 23.7 pg  Mean Cell Hemoglobin Concentration : 29.2 gm/dL  Auto Neutrophil # : 5.10 K/uL  Auto Lymphocyte # : 1.53 K/uL  Auto Monocyte # : 0.78 K/uL  Auto Eosinophil # : 0.11 K/uL  Auto Basophil # : 0.04 K/uL  Auto Neutrophil % : 67.2 %  Auto Lymphocyte % : 20.1 %  Auto Monocyte % : 10.3 %  Auto Eosinophil % : 1.4 %  Auto Basophil % : 0.5 %    05-12    134<L>  |  95<L>  |  37<H>  ----------------------------<  188<H>  4.9   |  26  |  7.34<H>    Ca    8.8      12 May 2022 17:15      CAPILLARY BLOOD GLUCOSE      POCT Blood Glucose.: 175 mg/dL (13 May 2022 08:04)  POCT Blood Glucose.: 138 mg/dL (12 May 2022 22:07)  POCT Blood Glucose.: 130 mg/dL (12 May 2022 18:59)  POCT Blood Glucose.: 230 mg/dL (12 May 2022 12:40)          Vitamin B12: 467 pg/mL [232 - 1245] 05-12-22 @ 07:11  Folate: -- 05-12-22 @ 07:11  Thyroid Function: -- 05-12-22 @ 07:11  Ammonia: -- 05-12-22 @ 07:11  Dilantin: -- 05-12-22 @ 07:11      Vitals:  Vital Signs Last 24 Hrs  T(C): 36.7 (13 May 2022 04:57), Max: 36.7 (13 May 2022 04:57)  T(F): 98.1 (13 May 2022 04:57), Max: 98.1 (13 May 2022 04:57)  HR: 95 (13 May 2022 04:57) (90 - 122)  BP: 115/60 (13 May 2022 04:57) (115/60 - 157/59)  BP(mean): --  RR: 18 (13 May 2022 04:57) (18 - 18)  SpO2: 96% (13 May 2022 04:57) (95% - 99%)      NEUROLOGICAL EXAM:    Mental status: awake alert, and in no apparent distress. Oriented to person, knows he is in a hospital but not which one, year 2000 something, when asked president says who cares and would not try to guess   Language intact.  Attention impaired.      Cranial Nerves: Pupils were equal, round, reactive to light. Extraocular movements were intact. B BTT Facial sensation was intact to light touch. There was no facial asymmetry. The palate was upgoing symmetrically and tongue was midline. Hearing acuity was intact to finger rub AU. Shoulder shrug was full bilaterally    Motor exam: Bulk and tone were normal. moves all ext.  Fine finger movements were symmetric. There was bilateral symmetric pronator drift    Reflexes: DTRs depressed, flexor plantars.     Sensation: Intact to noxious, seems intact to light touch, due to decreased attention did not assess other modalities     Coordination: no gross dysmetria    Gait: deferred    NIHSS: 13    Imaging:    ACC: [id number] EXAM:  CT BRAIN                        PROCEDURE DATE:  04/09/2022      INTERPRETATION:  CLINICAL INFORMATION:  Altered mental status, on   anticoagulation .    TECHNIQUE: Multiple contiguous axial images were acquired from the   skullbase to the vertex without the administration of intravenous   contrast.  Coronal and sagittal reformations were made.    COMPARISON: CT head 10/21/2021, brain MRI 02/23/2014.    FINDINGS:    Scattered lacunar infarcts in the bilateral cerebralhemispheres are   grossly stable compared to the 10/21/2021 head CT. No new additional   infarcts are noted over the time interval.    No acute intracranial hemorrhage, mass effect, or midline shift. The   brain demonstrates periventricular hypoattenuation, nonspecific in   etiology but likely representing chronic microvascular ischemic changes.    No abnormal extra-axial fluid collections are present.    The ventricles and sulci demonstrate age appropriate involutional   changes.  The basal cisterns are patent.    The orbits are unremarkable. The paranasal sinuses are clear. The mastoid   air cells are clear. The calvarium is intact.    IMPRESSION:    No acute intracranial abnormality is noted. If the patient has new and   persistent symptoms, consider short interval follow-up head CT or brain   MRI.    --- End of Report ---    GATITO VILLARREAL MD; Resident Radiologist  This document has been electronically signed.  JESSE CORONA MD; Attending Radiologist  This document has been electronically signed. Apr 9 2022  2:00PM        ACC: [id number] EXAM:  MR BRAIN                          PROCEDURE DATE:  05/11/2022          INTERPRETATION:  MR brain  without gadolinium    CLINICAL INFORMATION: Stroke.    TECHNIQUE: Limited Sagittal T1-weighted images, axial FLAIR images, and   axial diffusion weighted images of the brain were obtained.  Patient was   unable to hold still for remaining sequences.    FINDINGS:   MRI dated 02/23/2014 available for review.    The brain demonstrates small acute infarctions in the BILATERAL posterior   centrum semiovale and LEFT corona radiata and LEFT posterior   periventricular white matter with restricted diffusion. No intracranial   hemorrhage is recognized. No mass effect is found in the brain.    The ventricles, sulci and basal cisterns show mild global atrophy most   prominent within the BILATERAL cerebellum.    The vertebral and internal carotid arteries demonstrate expected flow   voids indicating their patency.    The orbits are unremarkable.  The paranasal sinuses are clear.  The nasal   cavity appears intact.  The nasopharynx is symmetric.  The central skull   base and temporal bones are intact.  The calvarium appears unremarkable.    IMPRESSION: Small acute infarctions in the BILATERAL posterior centrum   semiovale and LEFT corona radiata and LEFT posterior periventricular   white matter with restricted diffusion.   mild global atrophy most   prominent within the BILATERAL cerebellum.    --- End of Report ---            JESÚS PADGETT MD; Attending Radiologist  This document has been electronically signed. May 11 2022  5:47PM        Patient name: DYLAN DEL CASTILLO  YOB: 1955   Age: 67 (M)   MR#: [id number]  Study Date: 2/22/2022  [facility]grapher: Iron Aguirre [facility]  Study quality: Technically difficult  Referring Physician: Mendoza Corral MD  Blood Pressure: 152/82 mmHg  Height: 180 cm  Weight: 136 kg  BSA: 2.5 m2  ------------------------------------------------------------------------  PROCEDURE: Transthoracic echocardiogram with 2-D, M-Mode  and complete spectral and color flow Doppler. Verbal  consent was obtained for injection of  Ultrasonic Enhancing  Agent following a discussion of risks and benefits.  Following intravenous injection of Ultrasonic Enhancing  Agent , harmonic imaging was performed.  INDICATION: Chest pain, unspecified (R07.9)  ------------------------------------------------------------------------  Dimensions:    Normal Values:  LA:     4.4    2.0 - 4.0 cm  Ao:     4.0    2.0 - 3.8 cm  SEPTUM: 1.1    0.6 - 1.2 cm  PWT:    0.9    0.6 - 1.1 cm  LVIDd:  5.0    3.0 - 5.6 cm  LVIDs:  3.5    1.8 - 4.0 cm  Derived variables:  LVMI: 73 g/m2  RWT: 0.36  EF (Visual Estimate): 60 %  Doppler Peak Velocity (m/sec): AoV=1.5 TV=2.2  ------------------------------------------------------------------------  Observations:  Mitral Valve: Normal mitral valve.  Aortic Valve/Aorta: Calcified aortic valve with normal  opening. Minimal aortic regurgitation.  Normal aortic root size.  Left Atrium: Mildly dilated left atrium.  Left Ventricle: Endocardial visualization enhanced with  intravenous injection of Ultrasonic Enhancing Agent  (Definity).  Normal left ventricular internal dimensions and wall  thickness.  Normal left ventricular systolic function. No segmental  wall motion abnormalities.  Right Heart: Normal right atrium. Normal right ventricular  size and function.  Normal tricuspid valve. Mild tricuspid regurgitation.  Normal pulmonic valve. Mild pulmonic regurgitation.  Pericardium/Pleura: Normal pericardium with no pericardial  effusion.  Hemodynamic: No evidence of pulmonary hypertension.  ------------------------------------------------------------------------  Conclusions:  Endocardial visualization enhanced with intravenous  injection of Ultrasonic Enhancing Agent (Definity).  Normal left ventricular systolic function. No segmental  wall motion abnormalities.  ------------------------------------------------------------------------  Confirmed on  2/22/2022 - 16:17:39 by Ishan Ackerman MD, FASE  ------------------------------------------------------------------------    < from: CT Angio Head w/ IV Cont (05.12.22 @ 13:34) >    ACC: [id number] EXAM:  CT ANGIO NECK (W)AW IC                        ACC: [id number] EXAM:  CT ANGIO BRAIN (W)AW IC                          PROCEDURE DATE:  05/12/2022          INTERPRETATION:  CT angiography of the brain and neck    CLINICAL INDICATION: Recent infarcts. Carotid stenosis.    TECHNIQUE: Direct axial CT scanning of the brain and neck was obtained   from the vertex to the level of the clavicular heads after the dynamic   intravenous injection of 44 cc of Omnipaque 300. 0 cc were discarded.   Sagittal and coronal maximum intensity projection reformats were   provided.  Three-dimensional reconstructions were performed by the   radiologist using the [employer] workstation.    Additionally, noncontrast axial CT scanning of the brain was obtained   from the skull base to the vertex. Sagittal and coronal reformats were   provided.    COMPARISON: MRA neck 10/24/2021    FINDINGS:    CT brain:    Previously seen acute infarcts in the bilateral cerebral hemispheres are   redemonstrated. No CT evidence for hemorrhagic transformation.    No hydrocephalus, midline shift, or effacement of basal cisterns.    No acute intracranial hemorrhage or vasogenic edema.    Mild to moderate white matter microvascular ischemic disease.    CTA brain:    Limited by relatively decreased opacification of the arteries.    Multifocal narrowing of the right skull base ICA due to calcified plaque,   the degree of which is not well evaluated.    Normal flow-related enhancement of the supraclinoid right ICA.    Lack of flow related enhancement of the petrous, precavernous, and   cavernous left ICA. Reconstitution of the vessel at its supraclinoid   segment.    Otherwise no flow-limiting stenosis.    Normal flow-related enhancement of the bilateral anterior, middle, and   posterior cerebral arteries.    Normal flow-related enhancement of the intradural vertebral arteries and   basilar artery.    No vascular aneurysm or AVM.    CTA neck:    Stenoses described in this report are on the basis of NASCET criteria.    Study is significantly limited by relatively decreased opacification of   the arteries.    Short segment stenosis of the mid left common carotid artery due to the   presence of partially calcified plaque is poorly evaluated. Flow-related   enhancement of the more proximal and distal left common carotid artery is   noted.    Long segment stenosis of the mid and distal right common carotid artery   due to the presence of partially calcified plaque is poorly evaluated.    Rapidly progressive stenosis of the left internal carotid artery   beginning at its origin. No flow related enhancement of the vessel beyond   its origin.    Normal flow-related enhancement of the bilateral vertebral arteries.    No gross evidence for acute arterial dissection.    IMPRESSION:    CTA brain:  Limited by relatively decreased opacification of the arteries.    Multifocal narrowing of the right skull base ICA due to calcified plaque,   the degree of which is not well evaluated.    Lack of flow related enhancement of the petrous, precavernous, and   cavernous left ICA. Reconstitution of the vessel at its supraclinoid   segment.    Otherwise no flow-limiting stenosis.    No vascular aneurysm or AVM.    CTA neck:  Limited by relatively decreased opacification of the arteries.    Short segment stenosis of the mid left common carotid artery due to the   presence of partially calcified plaque is poorly evaluated. Flow-related   enhancement of the more proximal and distal left common carotid artery is   noted.    Long segment stenosis of the mid and distal right common carotid artery   due to the presence of partially calcified plaque is poorly evaluated.    Rapidly progressive stenosis of the left internal carotid artery   beginning at its origin. No flow related enhancement of the vessel beyond   its origin.    Recommend correlation with contrast-enhanced MRI of the neck for further   evaluation.    --- End of Report ---            MIGUEL ANGEL CARRILLO MD; Attending Radiologist  This document has been electronically signed. May 12 2022  2:47PM    < end of copied text >

## 2022-05-14 NOTE — PROGRESS NOTE ADULT - ASSESSMENT
A/P    68 y/o M with history of CAD, s/p multiple PCI, with most recent 2/22 PCI of LAD in setting of NSTEMI, on ASA only (pradaxa), chronic atrial fibrillation maintained on Pradaxa, essential HTN, type 2 DM previously on oral medications but on insulin, diabetic neuropathy with chronic RIGHT LE venous stasis changes>left, LEFT foot ulcer seen by podiatry, past endarterectomy LEFT CEA in 2014 presenting with 1 week of decreased urine output, cystitis with hematuria     #ANT on CKD, new HD  -oliguric renal failure in setting of UTI/cystitis  -New HD for uremia, renal failure  -sp rrt 4/12 for uncontrolled bleeding sp  right groin shiley removal  - monitor h/h - stable  -s/p L AVG 4/18  -s/p permacath placement 4/20  -renal f/ u    #AMS/Lethargy  -recent ams from pain meds  -AMS sp RRT 4/27  likely secondary to pain med   -repeat CT 4/27 unremarkable --  discontinued Percocet  -MRI 5/11  revealed small acute infarctions in bilateral posterior centrum semiovale and left corona radiata and left posterior periventricular white matter  -on asa statin  -repeat echo with no interval changes  -CTa head neck noted; neuro following     #Acute on chronic HFpEF  -stable  -continue volume removal with HD  -c/w bb  -repeat echo with stable borderline lv fxn    #Chronic AF  -rates stable   -c/w metoprolol succ 100 mg qd  -c/w dilt cd 120mg daily    -brief pause noted- likely secondary to MOR  -c/w eliquis 5 mg BID for now - heme stable     #HTN  -stable  -c/w meds       #CAD, s/p PCI, most recent 2/2022  -stable, cont asa  -off plavix due to a/c indication  -statin     #carotid stenosis   -previous MRA  noted with Greater than 75% stenosis of the right distal common carotid artery. Approximately 60% stenosis of the proximal left internal carotid artery.  -carotid dopplers 5/11 noted :  There is new occlusion of the left internal carotid artery;  greater than 70% stenosis involving the distal right common carotid artery as was seen previously. Mild to moderate flow-limiting stenosis is seen at the left external   carotid artery.  -CTa head and neck 5/12 noted  -Continue ASA and Statin Therapy  -neuro fu  / work up / imaging per vascular   -await mra    35 minutes spent on total encounter; more than 50% of the visit was spent counseling and/or coordinating care by the attending physician.

## 2022-05-14 NOTE — PROGRESS NOTE ADULT - SUBJECTIVE AND OBJECTIVE BOX
Patient is a 67y old  Male who presents with a chief complaint of Decreased urine output for the past week, leg oedema (14 May 2022 11:28)      Vascular Surgery Attending Progress Note    Interval HPI: pt w/o new c/o     Medications:  allopurinol 100 milliGRAM(s) Oral daily  apixaban 5 milliGRAM(s) Oral two times a day  aspirin enteric coated 81 milliGRAM(s) Oral daily  atorvastatin 40 milliGRAM(s) Oral at bedtime  bisacodyl 5 milliGRAM(s) Oral every 12 hours PRN  chlorhexidine 4% Liquid 1 Application(s) Topical <User Schedule>  dextrose 50% Injectable 25 Gram(s) IV Push once  dextrose 50% Injectable 25 Gram(s) IV Push once  dextrose 50% Injectable 12.5 Gram(s) IV Push once  dextrose Oral Gel 15 Gram(s) Oral once PRN  diltiazem    milliGRAM(s) Oral daily  hydrALAZINE 25 milliGRAM(s) Oral three times a day  insulin lispro (ADMELOG) corrective regimen sliding scale   SubCutaneous three times a day before meals  insulin lispro (ADMELOG) corrective regimen sliding scale   SubCutaneous at bedtime  lidocaine   4% Patch 1 Patch Transdermal daily  metoprolol succinate  milliGRAM(s) Oral daily  mupirocin 2% Ointment 1 Application(s) Both Nostrils two times a day  polyethylene glycol 3350 17 Gram(s) Oral two times a day  povidone iodine 10% Solution 1 Application(s) Topical daily  QUEtiapine 25 milliGRAM(s) Oral at bedtime  senna 2 Tablet(s) Oral at bedtime  sevelamer carbonate Powder 1600 milliGRAM(s) Oral three times a day with meals  sodium chloride 0.9% lock flush 10 milliLiter(s) IV Push every 1 hour PRN      Vital Signs Last 24 Hrs  T(C): 36.6 (14 May 2022 11:23), Max: 36.6 (14 May 2022 11:23)  T(F): 97.8 (14 May 2022 11:23), Max: 97.8 (14 May 2022 11:23)  HR: 74 (14 May 2022 11:23) (74 - 98)  BP: 128/60 (14 May 2022 11:23) (128/60 - 138/60)  BP(mean): --  RR: 18 (14 May 2022 11:23) (18 - 18)  SpO2: 97% (14 May 2022 11:23) (97% - 97%)  I&O's Summary    13 May 2022 07:01  -  14 May 2022 07:00  --------------------------------------------------------  IN: 0 mL / OUT: 1 mL / NET: -1 mL    14 May 2022 07:01  -  14 May 2022 13:39  --------------------------------------------------------  IN: 200 mL / OUT: 0 mL / NET: 200 mL        Physical Exam:  Neuro  A&Ox3 VSS  Vascular:  no acute changes     LABS:                        8.9    7.60  )-----------( 266      ( 12 May 2022 17:15 )             30.5     05-12    134<L>  |  95<L>  |  37<H>  ----------------------------<  188<H>  4.9   |  26  |  7.34<H>    Ca    8.8      12 May 2022 17:15          UMAIR MCGRAW MD  [phone] Cell [phone]

## 2022-05-14 NOTE — PROGRESS NOTE ADULT - SUBJECTIVE AND OBJECTIVE BOX
CARDIOLOGY FOLLOW UP NOTE - DR. SAGASTUME    Patient Name: DYLAN DEL CASTILLO  Date of Service: 05-14-22     no new events      Subjective:    cv: denies chest pain, dyspnea, palpitations, dizziness  pulmonary: denies cough  GI: denies abdominal pain, nausea, vomiting  vascular/legs: no edema   skin: no rash  ROS: otherwise negative   overnight events:      PHYSICAL EXAM:  T(C): 36.6 (05-14-22 @ 11:23), Max: 36.6 (05-14-22 @ 11:23)  HR: 74 (05-14-22 @ 11:23) (74 - 98)  BP: 128/60 (05-14-22 @ 11:23) (128/60 - 138/60)  RR: 18 (05-14-22 @ 11:23) (18 - 18)  SpO2: 97% (05-14-22 @ 11:23) (97% - 97%)  Wt(kg): --  I&O's Summary    13 May 2022 07:01  -  14 May 2022 07:00  --------------------------------------------------------  IN: 0 mL / OUT: 1 mL / NET: -1 mL    14 May 2022 07:01  -  14 May 2022 14:08  --------------------------------------------------------  IN: 200 mL / OUT: 0 mL / NET: 200 mL      Daily     Daily     Appearance: Normal	  Cardiovascular: Normal S1 S2,RRR, No JVD, No murmurs  Respiratory: Lungs clear to auscultation	  Gastrointestinal:  Soft, Non-tender, + BS	  Extremities: Normal range of motion, No clubbing, cyanosis or edema      Home Medications:  allopurinol 100 mg oral tablet: 1 tab(s) orally once a day (03 Apr 2022 06:34)  aspirin 81 mg oral delayed release tablet: 1 tab(s) orally once a day (03 Apr 2022 06:34)  bumetanide 2 mg oral tablet: 1 tab(s) orally once a day (03 Apr 2022 06:34)  insulin glargine 100 units/mL subcutaneous solution: 25 unit(s) subcutaneous once a day (at bedtime) (03 Apr 2022 06:34)  metOLazone 5 mg oral tablet: 1 tab(s) orally 3 times a week (03 Apr 2022 06:34)  metoprolol succinate 50 mg oral tablet, extended release: 2 tab(s) orally once a day (03 Apr 2022 06:34)  NovoLOG 100 units/mL subcutaneous solution: subcutaneous 4 times a day (before meals and at bedtime) (03 Apr 2022 06:34)  NovoLOG FlexPen 100 units/mL injectable solution: 10 unit(s) subcutaneous 3 times a day (03 Apr 2022 06:34)  oxycodone-acetaminophen 5 mg-325 mg oral tablet: 1 tab(s) orally 2 times a day (03 Apr 2022 06:34)  Pradaxa 150 mg oral capsule: 1 cap(s) orally 2 times a day (03 Apr 2022 06:34)  pregabalin 100 mg oral capsule: 1 cap(s) orally 3 times a day (03 Apr 2022 06:34)      MEDICATIONS  (STANDING):  allopurinol 100 milliGRAM(s) Oral daily  apixaban 5 milliGRAM(s) Oral two times a day  aspirin enteric coated 81 milliGRAM(s) Oral daily  atorvastatin 40 milliGRAM(s) Oral at bedtime  chlorhexidine 4% Liquid 1 Application(s) Topical <User Schedule>  dextrose 50% Injectable 25 Gram(s) IV Push once  dextrose 50% Injectable 12.5 Gram(s) IV Push once  dextrose 50% Injectable 25 Gram(s) IV Push once  diltiazem    milliGRAM(s) Oral daily  hydrALAZINE 25 milliGRAM(s) Oral three times a day  insulin lispro (ADMELOG) corrective regimen sliding scale   SubCutaneous at bedtime  insulin lispro (ADMELOG) corrective regimen sliding scale   SubCutaneous three times a day before meals  lidocaine   4% Patch 1 Patch Transdermal daily  metoprolol succinate  milliGRAM(s) Oral daily  mupirocin 2% Ointment 1 Application(s) Both Nostrils two times a day  polyethylene glycol 3350 17 Gram(s) Oral two times a day  povidone iodine 10% Solution 1 Application(s) Topical daily  QUEtiapine 25 milliGRAM(s) Oral at bedtime  senna 2 Tablet(s) Oral at bedtime  sevelamer carbonate Powder 1600 milliGRAM(s) Oral three times a day with meals      TELEMETRY: 	    ECG:  	  RADIOLOGY:   DIAGNOSTIC TESTING:  [ ] Echocardiogram:  [ ] Catheterization:  [ ] Stress Test:    OTHER: 	    LABS:	 	    CARDIAC MARKERS:                                      8.9    7.60  )-----------( 266      ( 12 May 2022 17:15 )             30.5     05-12    134<L>  |  95<L>  |  37<H>  ----------------------------<  188<H>  4.9   |  26  |  7.34<H>    Ca    8.8      12 May 2022 17:15      proBNP:     Lipid Profile:   HgA1c:     Creatinine, Serum: 7.34 mg/dL (05-12-22 @ 17:15)

## 2022-05-14 NOTE — PROGRESS NOTE ADULT - SUBJECTIVE AND OBJECTIVE BOX
DATE OF SERVICE: 05-14-22 @ 11:28  CHIEF COMPLAINT:Patient is a 67y old  Male who presents with a chief complaint of Decreased urine output for the past week, leg oedema (13 May 2022 22:06)    	        PAST MEDICAL & SURGICAL HISTORY:  HTN (hypertension), benign      HLD (hyperlipidemia)      DM type 2 (diabetes mellitus, type 2)      TIA (transient ischemic attack)      Atrial fibrillation      MI (myocardial infarction)  circa 2014 and 2022.      CAD (coronary artery disease)      Neuropathy      Stage 3 chronic kidney disease      2019 novel coronavirus disease (COVID-19)  12/2021.  Received the COVID-19 vaccine x 2 as of April 2022.      Status post angioplasty with stent  LULU x 3 2/7/2014      S/P carotid endarterectomy  left                RESPIRATORY: No cough, wheezing, chills or hemoptysis; No Shortness of Breath  CARDIOVASCULAR: No chest pain, palpitations, passing out,   GASTROINTESTINAL: No abdominal or epigastric pain. No nausea, vomiting, or hematemesis;  GENITOURINARY: No dysuria, frequency, hematuria, or incontinence  NEUROLOGICAL: No headaches,      Medications:  MEDICATIONS  (STANDING):  allopurinol 100 milliGRAM(s) Oral daily  apixaban 5 milliGRAM(s) Oral two times a day  aspirin enteric coated 81 milliGRAM(s) Oral daily  atorvastatin 40 milliGRAM(s) Oral at bedtime  chlorhexidine 4% Liquid 1 Application(s) Topical <User Schedule>  dextrose 50% Injectable 25 Gram(s) IV Push once  dextrose 50% Injectable 25 Gram(s) IV Push once  dextrose 50% Injectable 12.5 Gram(s) IV Push once  diltiazem    milliGRAM(s) Oral daily  hydrALAZINE 25 milliGRAM(s) Oral three times a day  insulin lispro (ADMELOG) corrective regimen sliding scale   SubCutaneous three times a day before meals  insulin lispro (ADMELOG) corrective regimen sliding scale   SubCutaneous at bedtime  lidocaine   4% Patch 1 Patch Transdermal daily  LORazepam   Injectable 1 milliGRAM(s) IV Push once  metoprolol succinate  milliGRAM(s) Oral daily  mupirocin 2% Ointment 1 Application(s) Both Nostrils two times a day  polyethylene glycol 3350 17 Gram(s) Oral two times a day  povidone iodine 10% Solution 1 Application(s) Topical daily  QUEtiapine 25 milliGRAM(s) Oral at bedtime  senna 2 Tablet(s) Oral at bedtime  sevelamer carbonate Powder 1600 milliGRAM(s) Oral three times a day with meals    MEDICATIONS  (PRN):  bisacodyl 5 milliGRAM(s) Oral every 12 hours PRN Constipation  dextrose Oral Gel 15 Gram(s) Oral once PRN Blood Glucose LESS THAN 70 milliGRAM(s)/deciliter  sodium chloride 0.9% lock flush 10 milliLiter(s) IV Push every 1 hour PRN Pre/post blood products, medications, blood draw, and to maintain line patency    	    PHYSICAL EXAM:  T(C): 36.6 (05-14-22 @ 11:23), Max: 36.7 (05-13-22 @ 12:02)  HR: 74 (05-14-22 @ 11:23) (74 - 98)  BP: 128/60 (05-14-22 @ 11:23) (128/60 - 138/60)  RR: 18 (05-14-22 @ 11:23) (18 - 18)  SpO2: 97% (05-14-22 @ 11:23) (97% - 97%)  Wt(kg): --  I&O's Summary    13 May 2022 07:01  -  14 May 2022 07:00  --------------------------------------------------------  IN: 0 mL / OUT: 1 mL / NET: -1 mL    14 May 2022 07:01  -  14 May 2022 11:28  --------------------------------------------------------  IN: 200 mL / OUT: 0 mL / NET: 200 mL        Appearance: Normal	  HEENT:   Normal oral mucosa, PERRL, EOMI	    Cardiovascular: Normal S1 S2,  Respiratory: Lungs clear to auscultation	  Psychiatry: A & O   Gastrointestinal:  Soft, Non-tender, + BS	    Neurologic: Non-focal  Extremities: dec rom  pvd    TELEMETRY: 	    ECG:  	  RADIOLOGY:  OTHER: 	  	  LABS:	 	    CARDIAC MARKERS:                                8.9    7.60  )-----------( 266      ( 12 May 2022 17:15 )             30.5     05-12    134<L>  |  95<L>  |  37<H>  ----------------------------<  188<H>  4.9   |  26  |  7.34<H>    Ca    8.8      12 May 2022 17:15      proBNP:   Lipid Profile:   HgA1c:   TSH:

## 2022-05-14 NOTE — PROGRESS NOTE ADULT - ASSESSMENT
Impression:  This is a 67 year old gentleman pmhx CAD s/p MI/PCI/CABG, hx TIA, afib on rivaroxaban, HTN, DM2, PVD, gout, L CEA 2014, and CKD who presented to [facility] 4/11/22 with ANT, tremors, confusion and lethargy.  CT head no acute changes.  S/p initiation of hemodialysis.  Mental status has improved dramatically.  Pt denies any headaches, no slurred speech, no hemiparesis.  He has chronic gout with bilateral finger swelling, pain.  He also has chronic neuropathy.  Both legs are weak.      Persistent encephalopathy prompted re-consultation on 5/10/22.  MRI brain was ordered and revealed small acute infarctions in bilateral posterior centrum semiovale and left corona radiata and left posterior periventricular white matter.  Carotid ultrasound was performed showing new occlusion of the left internal carotid artery, along with greater than 70% stenosis involving the distal right common carotid artery, and mild to moderate flow-limiting stenosis is seen at the left external carotid artery.  Vascular surgery has been consulted and is recommending CTA.  He continues to be encephalopathic and lethargic.      Diagnosis and Recommendations:  Small scattered infarcts  - Infarcts not causing encephalopathy but likely reflective of fact that brain is being perfused by right CCA/ICA that in itself shows severe stenosis.    -- MRA was suboptimal not visualing the neck but MRA head showed left KESHAWN coming from ACOM and left PCA feeding left MCA.  ICA showed no flow.    - in setting of atrial fibrillation may well be cardioembolic, continue anticoagulation as per cardiology  - vascular f/u.  s/p carotid doppler, suboptimal CTA/MRA.  However, it is apparent that there is a proximal left ICA occlusion, which would not be a candidate for intervention.  Intervention for right CCA/ICA stenosis would be high risk but it may indeed by a symptomatic stenosis  - ECHO in February 2022 did not reveal severe cardiomyopathy, vegetation, or LV thrombus.  Consider repeating to confirm.  - HgA1c of 10.1 in February 2022 may be driving stroke risk.  Goal is < 7.  Will repeat   - Will check lipid panel.  Continue high-intensity statin therapy for now.    - PT/OT/SLP  - on asa/lipitor

## 2022-05-14 NOTE — PROGRESS NOTE ADULT - ASSESSMENT
67 year old gentleman with PMH of CAD, NSTEMI s/p 3 stents in 2014 (stents to LAD, proximal and distal LCx), ischemic cardiomyopathy, HTN for 10 years, T2DM for 26 years c/b peripheral neuropathy, CVA, TIA, Afib on Pradaxa, chronic RLE wound, L carotid stenosis s/p endarterectomy (2014) just recently admitted  to the [facility] on 2/19/2022 with acute onset chest pain for one day found to have an NSTEMI, CAD, s/p PCI in setting of increased AF rates off bb for 3 days and resolved chest pain, his CKMB peaked and Echo noted with EF 60%,normal LV function, mild TR, mild MI. He was s/p [facility] with 85% proximal LAD s/p balloon angioplasty and LULU. He presented to [facility] ED with decreased urine output over the week. [facility]. Stevens went to [facility] for hematuria and was started  on Nitrofurantoin. Pt is still taking Nitrofurantoin w/ 5 days left. He came to the ED due to worsening symptoms. Pt reports having a similar incident 4-5 years ago that resolved spontaneously. He reports increased leg edema Dyspnea worse on exertion. his baseline Serum creatinine is in the 2.0 to 2.3 mg/dl range. ANT with serum creatinine of 8.29 with bun 144 and k 5.5      1- ANT on ckd III ---> ESRD likely   2- chf chronic   3- hyperphosphatemia /shpt   4- anemia   5- hyperlipidemia   6- HTN /a fib  7- TIA hx   8- hx of carotid stenosis      HD after MRA [ d/w pt daughter re: risk of nephrogenic systemic fibrosis ]   F200, 225 min, , , 1L fluid removal  see HD flowsheet  dialysis urr in range   renvela 2 tab with meals   anemia - epogen 61843 Units TIW with HD.  PT  seroquel 25 mg daily  ? increase to 50 mg qhs

## 2022-05-14 NOTE — PROGRESS NOTE ADULT - ASSESSMENT
·  Problem:[email or web address].co hemodialysis per renal  to cont as per renal     Problem/Plan - 2:  ·  Problem: Chronic atrial fibrillation. c/w a/c  cards f/u     Problem/Plan - 3:  ·  Problem: Cystitis.   ID follow up appreciated        Problem/Plan - 4:  ·  Problem: Type 2 diabetes mellitus. c/w insulin   endo f/u      Problem/Plan - 5:  ·  Problem: CAD (coronary artery disease).   rapid a fib  meds adjusted  hr stable  c/w a/c       Persistent encephalopathy   neuro follow up appreciated    carotid duplex noted  cta noted  mra/   vasc to f/u  discussed w/ daughter  at bedside

## 2022-05-14 NOTE — PROGRESS NOTE ADULT - PROBLEM SELECTOR PLAN 3
I Edward Pinto MD have personally  seen and examined the patient today and have noted the findings and formulated the plan of care.  I Edward Pinto MD have personally seen and examined the patient at bedside today at 130pm

## 2022-05-14 NOTE — PROGRESS NOTE ADULT - ASSESSMENT
67M PMH of CAD, NSTEMI s/p 3 stents in 2014 (stents to LAD, proximal and distal LCx), ischemic cardiomyopathy, HTN for 10 years, T2DM for 26 years c/b peripheral neuropathy, CVA, TIA, Afib on Pradaxa, chronic RLE wound, L carotid stenosis s/p endarterectomy (2014) just recently admitted  to the [facility] on 2/19/2022 with acute onset chest pain for one day found to have an NSTEMI, CAD, s/p PCI in setting of increased AF rates off bb for 3 days and resolved chest pain, his CKMB peaked and Echo noted with EF 60%,normal LV function, mild TR, mild GA. He was s/p C with 85% proximal LAD s/p balloon angioplasty and LULU. He presented to [facility] ED with decreased urine output over the week. Carotid duplex showed Calcified atherosclerotic disease seen throughout the carotid systems. There is new occlusion of the left internal carotid artery. There is greater than 70% stenosis involving the distal right common carotid artery as was seen previously. Mild to moderate flow-limiting stenosis is seen at the left external carotid artery. Vascular surgery consulted for carotid stenosis. Pt currently denies vision changes, dizziness, lightheadedness or any other symptoms at this point in time.     PLAN:   mra of the neck to eval  rt ica stenosis severity pending   will follow

## 2022-05-14 NOTE — PROGRESS NOTE ADULT - SUBJECTIVE AND OBJECTIVE BOX
[facility]   [phone]  RENAL FOLLOW UP NOTE  --------------------------------------------------------------------------------  Chief Complaint:    24 hour events/subjective:    seen earlier still confusion but communicative     PAST HISTORY  --------------------------------------------------------------------------------  No significant changes to PMH, PSH, FHx, SHx, unless otherwise noted    ALLERGIES & MEDICATIONS  --------------------------------------------------------------------------------  Allergies    nitrofurantoin (Nephrotoxicity)    Intolerances      Standing Inpatient Medications  allopurinol 100 milliGRAM(s) Oral daily  apixaban 5 milliGRAM(s) Oral two times a day  aspirin enteric coated 81 milliGRAM(s) Oral daily  atorvastatin 40 milliGRAM(s) Oral at bedtime  chlorhexidine 4% Liquid 1 Application(s) Topical <User Schedule>  dextrose 50% Injectable 25 Gram(s) IV Push once  dextrose 50% Injectable 25 Gram(s) IV Push once  dextrose 50% Injectable 12.5 Gram(s) IV Push once  diltiazem    milliGRAM(s) Oral daily  hydrALAZINE 25 milliGRAM(s) Oral three times a day  insulin lispro (ADMELOG) corrective regimen sliding scale   SubCutaneous three times a day before meals  insulin lispro (ADMELOG) corrective regimen sliding scale   SubCutaneous at bedtime  lidocaine   4% Patch 1 Patch Transdermal daily  metoprolol succinate  milliGRAM(s) Oral daily  mupirocin 2% Ointment 1 Application(s) Both Nostrils two times a day  polyethylene glycol 3350 17 Gram(s) Oral two times a day  povidone iodine 10% Solution 1 Application(s) Topical daily  QUEtiapine 25 milliGRAM(s) Oral at bedtime  senna 2 Tablet(s) Oral at bedtime  sevelamer carbonate Powder 1600 milliGRAM(s) Oral three times a day with meals    PRN Inpatient Medications  bisacodyl 5 milliGRAM(s) Oral every 12 hours PRN  dextrose Oral Gel 15 Gram(s) Oral once PRN  sodium chloride 0.9% lock flush 10 milliLiter(s) IV Push every 1 hour PRN      REVIEW OF SYSTEMS  --------------------------------------------------------------------------------      VITALS/PHYSICAL EXAM  --------------------------------------------------------------------------------  T(C): 36.6 (05-14-22 @ 19:25), Max: 36.6 (05-14-22 @ 11:23)  HR: 95 (05-14-22 @ 19:25) (74 - 98)  BP: 112/62 (05-14-22 @ 19:25) (112/62 - 138/60)  RR: 18 (05-14-22 @ 19:25) (18 - 18)  SpO2: 96% (05-14-22 @ 19:25) (96% - 97%)  Wt(kg): --        05-13-22 @ 07:01  -  05-14-22 @ 07:00  --------------------------------------------------------  IN: 0 mL / OUT: 1 mL / NET: -1 mL    05-14-22 @ 07:01  -  05-14-22 @ 20:18  --------------------------------------------------------  IN: 200 mL / OUT: 1000 mL / NET: -800 mL      Physical Exam:  	       Gen: awake, oriented 2 , appears comfortable  	Pulm: Decreased breath sounds b/l bases.  	CV: No JVD. IRR,   	Abd: +BS, soft, nontender, softly distended, obese               Extremity no edema              Extremity LE: + hyperpigmented bilateral LE with no edema              Vascular: R IJ HD catheter, L arm AVF       LABS/STUDIES  --------------------------------------------------------------------------------                Creatinine Trend:  SCr 7.34 [05-12 @ 17:15]  SCr 8.68 [05-10 @ 15:00]  SCr 5.39 [05-08 @ 10:30]  SCr 6.40 [05-07 @ 06:42]  SCr 4.44 [05-06 @ 07:57]                  Iron 16, TIBC 239, %sat 7      [04-15-22 @ 10:16]  Ferritin 172      [04-15-22 @ 09:05]  PTH -- (Ca 8.3)      [04-15-22 @ 12:18]   150  PTH -- (Ca --)      [04-03-22 @ 23:06]   97  HbA1c 11.7      [11-07-19 @ 09:14]  TSH 1.71      [05-12-22 @ 07:11]

## 2022-05-15 NOTE — PROGRESS NOTE ADULT - ASSESSMENT
A/P    66 y/o M with history of CAD, s/p multiple PCI, with most recent 2/22 PCI of LAD in setting of NSTEMI, on ASA only (pradaxa), chronic atrial fibrillation maintained on Pradaxa, essential HTN, type 2 DM previously on oral medications but on insulin, diabetic neuropathy with chronic RIGHT LE venous stasis changes>left, LEFT foot ulcer seen by podiatry, past endarterectomy LEFT CEA in 2014 presenting with 1 week of decreased urine output, cystitis with hematuria     #ANT on CKD, new HD  -oliguric renal failure in setting of UTI/cystitis  -New HD for uremia, renal failure  -sp rrt 4/12 for uncontrolled bleeding sp  right groin shiley removal  - monitor h/h - stable  -s/p L AVG 4/18  -s/p permacath placement 4/20  -renal f/ u    #AMS/Lethargy  -recent ams from pain meds  -AMS sp RRT 4/27  likely secondary to pain med   -repeat CT 4/27 unremarkable --  discontinued Percocet  -MRI 5/11  revealed small acute infarctions in bilateral posterior centrum semiovale and left corona radiata and left posterior periventricular white matter  -on asa statin  -repeat echo with no interval changes  -CTa head neck noted; neuro following     #Acute on chronic HFpEF  -stable  -continue volume removal with HD  -c/w bb  -repeat echo with stable borderline lv fxn    #Chronic AF  -rates stable   -c/w metoprolol succ 100 mg qd  -c/w dilt cd 120mg daily    -brief pause noted- likely secondary to MOR  -c/w eliquis 5 mg BID for now - heme stable     #HTN  -stable  -c/w meds       #CAD, s/p PCI, most recent 2/2022  -stable, cont asa  -off plavix due to a/c indication  -statin     #carotid stenosis   -previous MRA  noted with Greater than 75% stenosis of the right distal common carotid artery. Approximately 60% stenosis of the proximal left internal carotid artery.  -carotid dopplers 5/11 noted :  There is new occlusion of the left internal carotid artery;  greater than 70% stenosis involving the distal right common carotid artery as was seen previously. Mild to moderate flow-limiting stenosis is seen at the left external   carotid artery.  -CTa head and neck 5/12 noted  -Continue ASA and Statin Therapy  -neuro fu  / work up / imaging per vascular   -f/u mra    35 minutes spent on total encounter; more than 50% of the visit was spent counseling and/or coordinating care by the attending physician.

## 2022-05-15 NOTE — PROGRESS NOTE ADULT - SUBJECTIVE AND OBJECTIVE BOX
CARDIOLOGY FOLLOW UP NOTE - DR. SAGASTUME    Patient Name: DYLAN DEL CASTILLO  Date of Service: 05-15-22 @ 11:10    Patient seen and examined    Subjective:    cv: denies chest pain, dyspnea, palpitations, dizziness  pulmonary: denies cough  GI: denies abdominal pain, nausea, vomiting  vascular/legs: no edema   skin: no rash  ROS: otherwise negative   overnight events:      PHYSICAL EXAM:  T(C): 36.8 (05-15-22 @ 04:37), Max: 36.8 (05-15-22 @ 04:37)  HR: 101 (05-15-22 @ 09:29) (74 - 101)  BP: 135/72 (05-15-22 @ 09:29) (112/62 - 144/67)  RR: 18 (05-15-22 @ 04:37) (18 - 18)  SpO2: 96% (05-15-22 @ 09:29) (96% - 98%)  Wt(kg): --  I&O's Summary    14 May 2022 07:  -  15 May 2022 07:00  --------------------------------------------------------  IN: 200 mL / OUT: 1000 mL / NET: -800 mL    15 May 2022 07:  -  15 May 2022 11:10  --------------------------------------------------------  IN: 240 mL / OUT: 0 mL / NET: 240 mL      Daily     Daily Weight in k.5 (14 May 2022 19:25)    Appearance: Normal	  Cardiovascular: Normal S1 S2,RRR, No JVD, No murmurs  Respiratory: Lungs clear to auscultation	  Gastrointestinal:  Soft, Non-tender, + BS	  Extremities: Normal range of motion, No clubbing, cyanosis or edema      Home Medications:  allopurinol 100 mg oral tablet: 1 tab(s) orally once a day (2022 06:34)  aspirin 81 mg oral delayed release tablet: 1 tab(s) orally once a day (2022 06:34)  bumetanide 2 mg oral tablet: 1 tab(s) orally once a day (2022 06:34)  insulin glargine 100 units/mL subcutaneous solution: 25 unit(s) subcutaneous once a day (at bedtime) (2022 06:34)  metOLazone 5 mg oral tablet: 1 tab(s) orally 3 times a week (2022 06:34)  metoprolol succinate 50 mg oral tablet, extended release: 2 tab(s) orally once a day (:34)  NovoLOG 100 units/mL subcutaneous solution: subcutaneous 4 times a day (before meals and at bedtime) (2022 06:34)  NovoLOG FlexPen 100 units/mL injectable solution: 10 unit(s) subcutaneous 3 times a day (:34)  oxycodone-acetaminophen 5 mg-325 mg oral tablet: 1 tab(s) orally 2 times a day (2022 06:34)  Pradaxa 150 mg oral capsule: 1 cap(s) orally 2 times a day (:34)  pregabalin 100 mg oral capsule: 1 cap(s) orally 3 times a day (2022 06:34)      MEDICATIONS  (STANDING):  allopurinol 100 milliGRAM(s) Oral daily  apixaban 5 milliGRAM(s) Oral two times a day  aspirin enteric coated 81 milliGRAM(s) Oral daily  atorvastatin 40 milliGRAM(s) Oral at bedtime  chlorhexidine 4% Liquid 1 Application(s) Topical <User Schedule>  dextrose 50% Injectable 25 Gram(s) IV Push once  dextrose 50% Injectable 25 Gram(s) IV Push once  dextrose 50% Injectable 12.5 Gram(s) IV Push once  diltiazem    milliGRAM(s) Oral daily  hydrALAZINE 25 milliGRAM(s) Oral three times a day  insulin lispro (ADMELOG) corrective regimen sliding scale   SubCutaneous three times a day before meals  insulin lispro (ADMELOG) corrective regimen sliding scale   SubCutaneous at bedtime  lidocaine   4% Patch 1 Patch Transdermal daily  metoprolol succinate  milliGRAM(s) Oral daily  mupirocin 2% Ointment 1 Application(s) Both Nostrils two times a day  polyethylene glycol 3350 17 Gram(s) Oral two times a day  povidone iodine 10% Solution 1 Application(s) Topical daily  QUEtiapine 25 milliGRAM(s) Oral at bedtime  senna 2 Tablet(s) Oral at bedtime  sevelamer carbonate Powder 1600 milliGRAM(s) Oral three times a day with meals      TELEMETRY: 	    ECG:  	  RADIOLOGY:   DIAGNOSTIC TESTING:  [ ] Echocardiogram:  [ ] Catheterization:  [ ] Stress Test:    OTHER: 	    LABS:	 	    CARDIAC MARKERS:                                      10.0   7.70  )-----------( 253      ( 15 May 2022 07:37 )             34.9     05-15    134<L>  |  95<L>  |  15  ----------------------------<  125<H>  4.5   |  26  |  4.22<H>    Ca    8.9      15 May 2022 07:37    TPro  6.9  /  Alb  3.3  /  TBili  0.5  /  DBili  x   /  AST  11  /  ALT  6<L>  /  AlkPhos  156<H>  05-15    proBNP:     Lipid Profile:   HgA1c:     Creatinine, Serum: 4.22 mg/dL (05-15-22 @ 07:37)  Creatinine, Serum: 7.34 mg/dL (22 @ 17:15)

## 2022-05-15 NOTE — PROGRESS NOTE ADULT - SUBJECTIVE AND OBJECTIVE BOX
Patient is a 67y old  Male who presents with a chief complaint of Decreased urine output for the past week, leg oedema (15 May 2022 11:10)      Vascular Surgery Attending Progress Note    Interval HPI: pt w/o new c/o     Medications:  allopurinol 100 milliGRAM(s) Oral daily  apixaban 5 milliGRAM(s) Oral two times a day  aspirin enteric coated 81 milliGRAM(s) Oral daily  atorvastatin 40 milliGRAM(s) Oral at bedtime  bisacodyl 5 milliGRAM(s) Oral every 12 hours PRN  chlorhexidine 4% Liquid 1 Application(s) Topical <User Schedule>  dextrose 50% Injectable 25 Gram(s) IV Push once  dextrose 50% Injectable 25 Gram(s) IV Push once  dextrose 50% Injectable 12.5 Gram(s) IV Push once  dextrose Oral Gel 15 Gram(s) Oral once PRN  diltiazem    milliGRAM(s) Oral daily  hydrALAZINE 25 milliGRAM(s) Oral three times a day  insulin lispro (ADMELOG) corrective regimen sliding scale   SubCutaneous three times a day before meals  insulin lispro (ADMELOG) corrective regimen sliding scale   SubCutaneous at bedtime  lidocaine   4% Patch 1 Patch Transdermal daily  metoprolol succinate  milliGRAM(s) Oral daily  mupirocin 2% Ointment 1 Application(s) Both Nostrils two times a day  polyethylene glycol 3350 17 Gram(s) Oral two times a day  povidone iodine 10% Solution 1 Application(s) Topical daily  QUEtiapine 25 milliGRAM(s) Oral at bedtime  senna 2 Tablet(s) Oral at bedtime  sevelamer carbonate Powder 1600 milliGRAM(s) Oral three times a day with meals  sodium chloride 0.9% lock flush 10 milliLiter(s) IV Push every 1 hour PRN      Vital Signs Last 24 Hrs  T(C): 36.4 (15 May 2022 11:26), Max: 36.8 (15 May 2022 04:37)  T(F): 97.6 (15 May 2022 11:26), Max: 98.2 (15 May 2022 04:37)  HR: 94 (15 May 2022 11:26) (87 - 101)  BP: 149/73 (15 May 2022 11:26) (112/62 - 149/73)  BP(mean): --  RR: 18 (15 May 2022 11:26) (18 - 18)  SpO2: 96% (15 May 2022 11:26) (96% - 98%)  I&O's Summary    14 May 2022 07:01  -  15 May 2022 07:00  --------------------------------------------------------  IN: 200 mL / OUT: 1000 mL / NET: -800 mL    15 May 2022 07:01  -  15 May 2022 15:57  --------------------------------------------------------  IN: 480 mL / OUT: 0 mL / NET: 480 mL        Physical Exam:  Neuro  A&Ox3 VSS  Vascular:  stable     LABS:                        10.0   7.70  )-----------( 253      ( 15 May 2022 07:37 )             34.9     05-15    134<L>  |  95<L>  |  15  ----------------------------<  125<H>  4.5   |  26  |  4.22<H>    Ca    8.9      15 May 2022 07:37    TPro  6.9  /  Alb  3.3  /  TBili  0.5  /  DBili  x   /  AST  11  /  ALT  6<L>  /  AlkPhos  156<H>  05-15        UMAIR MCGRAW MD  [id number] [phone]

## 2022-05-15 NOTE — PROGRESS NOTE ADULT - ASSESSMENT
·  Problem:[email or web address] hemodialysis per renal  to cont as per renal     Problem/Plan - 2:  ·  Problem: Chronic atrial fibrillation. c/w a/c  cards f/u     Problem/Plan - 3:  ·  Problem: Cystitis.   ID follow up appreciated        Problem/Plan - 4:  ·  Problem: Type 2 diabetes mellitus. c/w insulin   endo f/u      Problem/Plan - 5:  ·  Problem: CAD (coronary artery disease).   rapid a fib  meds adjusted  hr stable  c/w a/c       Persistent encephalopathy   neuro follow up appreciated    carotid duplex noted  cta noted  mra/ mri noted  tx plans as per vasc / neuro   vasc to f/u  discussed w/  family member at bedside

## 2022-05-15 NOTE — PROGRESS NOTE ADULT - PROBLEM SELECTOR PLAN 2
I Edward Pinto MD have personally  seen and examined the patient today and have noted the findings and formulated the plan of care.  I Edward Pinto MD have personally seen and examined the patient at bedside today at 11am

## 2022-05-15 NOTE — PROGRESS NOTE ADULT - PROBLEM SELECTOR PLAN 1
I Edward Pinto MD have personally  seen and examined the patient today and have noted the findings and formulated the plan of care.  I Edward Pinto MD have personally seen and examined the patient at bedside today a 11am

## 2022-05-15 NOTE — PROGRESS NOTE ADULT - SUBJECTIVE AND OBJECTIVE BOX
[facility]   [phone]  RENAL FOLLOW UP NOTE  --------------------------------------------------------------------------------  Chief Complaint:    24 hour events/subjective:    seen earlier.   communicative but still overall with ams     PAST HISTORY  --------------------------------------------------------------------------------  No significant changes to PMH, PSH, FHx, SHx, unless otherwise noted    ALLERGIES & MEDICATIONS  --------------------------------------------------------------------------------  Allergies    nitrofurantoin (Nephrotoxicity)    Intolerances      Standing Inpatient Medications  allopurinol 100 milliGRAM(s) Oral daily  apixaban 5 milliGRAM(s) Oral two times a day  aspirin enteric coated 81 milliGRAM(s) Oral daily  atorvastatin 40 milliGRAM(s) Oral at bedtime  chlorhexidine 4% Liquid 1 Application(s) Topical <User Schedule>  dextrose 50% Injectable 25 Gram(s) IV Push once  dextrose 50% Injectable 25 Gram(s) IV Push once  dextrose 50% Injectable 12.5 Gram(s) IV Push once  diltiazem    milliGRAM(s) Oral daily  hydrALAZINE 25 milliGRAM(s) Oral three times a day  insulin lispro (ADMELOG) corrective regimen sliding scale   SubCutaneous three times a day before meals  insulin lispro (ADMELOG) corrective regimen sliding scale   SubCutaneous at bedtime  lidocaine   4% Patch 1 Patch Transdermal daily  metoprolol succinate  milliGRAM(s) Oral daily  mupirocin 2% Ointment 1 Application(s) Both Nostrils two times a day  polyethylene glycol 3350 17 Gram(s) Oral two times a day  povidone iodine 10% Solution 1 Application(s) Topical daily  QUEtiapine 25 milliGRAM(s) Oral at bedtime  senna 2 Tablet(s) Oral at bedtime  sevelamer carbonate Powder 1600 milliGRAM(s) Oral three times a day with meals    PRN Inpatient Medications  bisacodyl 5 milliGRAM(s) Oral every 12 hours PRN  dextrose Oral Gel 15 Gram(s) Oral once PRN  sodium chloride 0.9% lock flush 10 milliLiter(s) IV Push every 1 hour PRN      REVIEW OF SYSTEMS  --------------------------------------------------------------------------------    Gen: denies fevers/chills,  CVS: denies chest pain/palpitations  Resp: denies SOB/Cough  GI: Denies N/V/Abd pain  : Denies dysuria      VITALS/PHYSICAL EXAM  --------------------------------------------------------------------------------  T(C): 36.6 (05-15-22 @ 19:51), Max: 36.8 (05-15-22 @ 04:37)  HR: 73 (05-15-22 @ 19:51) (73 - 101)  BP: 171/53 (05-15-22 @ 19:51) (115/61 - 171/53)  RR: 18 (05-15-22 @ 19:51) (18 - 18)  SpO2: 92% (05-15-22 @ 19:51) (92% - 98%)  Wt(kg): --        05-14-22 @ 07:01  -  05-15-22 @ 07:00  --------------------------------------------------------  IN: 200 mL / OUT: 1000 mL / NET: -800 mL    05-15-22 @ 07:01  -  05-15-22 @ 20:00  --------------------------------------------------------  IN: 480 mL / OUT: 0 mL / NET: 480 mL      Physical Exam:  	  	       Gen: awake, oriented 2 , appears comfortable  	Pulm: Decreased breath sounds b/l bases.  	CV: No JVD. IRR,   	Abd: +BS, soft, nontender, softly distended, obese               Extremity no edema              Extremity LE: + hyperpigmented bilateral LE with no edema              Vascular: R IJ HD catheter, L arm AVF        LABS/STUDIES  --------------------------------------------------------------------------------              10.0   7.70  >-----------<  253      [05-15-22 @ 07:37]              34.9     134  |  95  |  15  ----------------------------<  125      [05-15-22 @ 07:37]  4.5   |  26  |  4.22        Ca     8.9     [05-15-22 @ 07:37]    TPro  6.9  /  Alb  3.3  /  TBili  0.5  /  DBili  x   /  AST  11  /  ALT  6   /  AlkPhos  156  [05-15-22 @ 07:37]          Creatinine Trend:  SCr 4.22 [05-15 @ 07:37]  SCr 7.34 [05-12 @ 17:15]  SCr 8.68 [05-10 @ 15:00]  SCr 5.39 [05-08 @ 10:30]  SCr 6.40 [05-07 @ 06:42]                  Iron 16, TIBC 239, %sat 7      [04-15-22 @ 10:16]  Ferritin 172      [04-15-22 @ 09:05]  PTH -- (Ca 8.3)      [04-15-22 @ 12:18]   150  PTH -- (Ca --)      [04-03-22 @ 23:06]   97  HbA1c 11.7      [11-07-19 @ 09:14]  TSH 1.71      [05-12-22 @ 07:11]

## 2022-05-15 NOTE — PROGRESS NOTE ADULT - ASSESSMENT
67 year old gentleman with PMH of CAD, NSTEMI s/p 3 stents in 2014 (stents to LAD, proximal and distal LCx), ischemic cardiomyopathy, HTN for 10 years, T2DM for 26 years c/b peripheral neuropathy, CVA, TIA, Afib on Pradaxa, chronic RLE wound, L carotid stenosis s/p endarterectomy (2014) just recently admitted  to the [facility] on 2/19/2022 with acute onset chest pain for one day found to have an NSTEMI, CAD, s/p PCI in setting of increased AF rates off bb for 3 days and resolved chest pain, his CKMB peaked and Echo noted with EF 60%,normal LV function, mild TR, mild SD. He was s/p [facility] with 85% proximal LAD s/p balloon angioplasty and LULU. He presented to [facility] ED with decreased urine output over the week. [facility]. Stevens went to [facility] for hematuria and was started  on Nitrofurantoin. Pt is still taking Nitrofurantoin w/ 5 days left. He came to the ED due to worsening symptoms. Pt reports having a similar incident 4-5 years ago that resolved spontaneously. He reports increased leg edema Dyspnea worse on exertion. his baseline Serum creatinine is in the 2.0 to 2.3 mg/dl range. ANT with serum creatinine of 8.29 with bun 144 and k 5.5      1- ANT on ckd III ---> ESRD likely   2- chf chronic   3- hyperphosphatemia /shpt   4- anemia   5- hyperlipidemia   6- HTN /a fib  7- TIA hx   8- hx of carotid stenosis      no hd today   renvela 2 tab with meals   hydralazine 25 mg tid change to losartan 50 mg non hd days   anemia - epogen 15744 Units TIW with HD.  Recheck iron profile and ferritin   PT  seroquel 25 mg daily  ? increase to 50 mg qhs

## 2022-05-15 NOTE — PROGRESS NOTE ADULT - ASSESSMENT
67M PMH of CAD, NSTEMI s/p 3 stents in 2014 (stents to LAD, proximal and distal LCx), ischemic cardiomyopathy, HTN for 10 years, T2DM for 26 years c/b peripheral neuropathy, CVA, TIA, Afib on Pradaxa, chronic RLE wound, L carotid stenosis s/p endarterectomy (2014) just recently admitted  to the [facility] on 2/19/2022 with acute onset chest pain for one day found to have an NSTEMI, CAD, s/p PCI in setting of increased AF rates off bb for 3 days and resolved chest pain, his CKMB peaked and Echo noted with EF 60%,normal LV function, mild TR, mild CT. He was s/p C with 85% proximal LAD s/p balloon angioplasty and LULU. He presented to [facility] ED with decreased urine output over the week. Carotid duplex showed Calcified atherosclerotic disease seen throughout the carotid systems. There is new occlusion of the left internal carotid artery. There is greater than 70% stenosis involving the distal right common carotid artery as was seen previously. Mild to moderate flow-limiting stenosis is seen at the left external carotid artery. Vascular surgery consulted for carotid stenosis. Pt currently denies vision changes, dizziness, lightheadedness or any other symptoms at this point in time.     PLAN:   mra of the neck incomplete  exam  due to pt   d/w pt and fam above and from a vasc standpoint recommend to hold off on any additional w/u and optimize current conditions and  as an outpt  f/u in 4-6 weeks to re eval w MRI of the brain  for kiara cva and at that time will re eval w mra of the neck also to re eval  carotid patency  will follow

## 2022-05-15 NOTE — PROGRESS NOTE ADULT - SUBJECTIVE AND OBJECTIVE BOX
DATE OF SERVICE: 05-15-22 @ 10:41  CHIEF COMPLAINT:Patient is a 67y old  Male who presents with a chief complaint of Decreased urine output for the past week, leg oedema (14 May 2022 20:18)    	        PAST MEDICAL & SURGICAL HISTORY:  HTN (hypertension), benign      HLD (hyperlipidemia)      DM type 2 (diabetes mellitus, type 2)      TIA (transient ischemic attack)      Atrial fibrillation      MI (myocardial infarction)  circa 2014 and 2022.      CAD (coronary artery disease)      Neuropathy      Stage 3 chronic kidney disease      2019 novel coronavirus disease (COVID-19)  12/2021.  Received the COVID-19 vaccine x 2 as of April 2022.      Status post angioplasty with stent  LULU x 3 2/7/2014      S/P carotid endarterectomy  left              sleepy   arousable   RESPIRATORY: No cough, wheezing, chills or hemoptysis; No Shortness of Breath  CARDIOVASCULAR: No chest pain, palpitations, passing out,  GASTROINTESTINAL: No abdominal or epigastric pain. No nausea, vomiting, or hematemesis;  GENITOURINARY: No dysuria, frequency, hematuria,   NEUROLOGICAL: No headaches,     Medications:  MEDICATIONS  (STANDING):  allopurinol 100 milliGRAM(s) Oral daily  apixaban 5 milliGRAM(s) Oral two times a day  aspirin enteric coated 81 milliGRAM(s) Oral daily  atorvastatin 40 milliGRAM(s) Oral at bedtime  chlorhexidine 4% Liquid 1 Application(s) Topical <User Schedule>  dextrose 50% Injectable 12.5 Gram(s) IV Push once  dextrose 50% Injectable 25 Gram(s) IV Push once  dextrose 50% Injectable 25 Gram(s) IV Push once  diltiazem    milliGRAM(s) Oral daily  hydrALAZINE 25 milliGRAM(s) Oral three times a day  insulin lispro (ADMELOG) corrective regimen sliding scale   SubCutaneous at bedtime  insulin lispro (ADMELOG) corrective regimen sliding scale   SubCutaneous three times a day before meals  lidocaine   4% Patch 1 Patch Transdermal daily  metoprolol succinate  milliGRAM(s) Oral daily  mupirocin 2% Ointment 1 Application(s) Both Nostrils two times a day  polyethylene glycol 3350 17 Gram(s) Oral two times a day  povidone iodine 10% Solution 1 Application(s) Topical daily  QUEtiapine 25 milliGRAM(s) Oral at bedtime  senna 2 Tablet(s) Oral at bedtime  sevelamer carbonate Powder 1600 milliGRAM(s) Oral three times a day with meals    MEDICATIONS  (PRN):  bisacodyl 5 milliGRAM(s) Oral every 12 hours PRN Constipation  dextrose Oral Gel 15 Gram(s) Oral once PRN Blood Glucose LESS THAN 70 milliGRAM(s)/deciliter  sodium chloride 0.9% lock flush 10 milliLiter(s) IV Push every 1 hour PRN Pre/post blood products, medications, blood draw, and to maintain line patency    	    PHYSICAL EXAM:  T(C): 36.8 (05-15-22 @ 04:37), Max: 36.8 (05-15-22 @ 04:37)  HR: 101 (05-15-22 @ 09:29) (74 - 101)  BP: 135/72 (05-15-22 @ 09:29) (112/62 - 144/67)  RR: 18 (05-15-22 @ 04:37) (18 - 18)  SpO2: 96% (05-15-22 @ 09:29) (96% - 98%)  Wt(kg): --  I&O's Summary    14 May 2022 07:01  -  15 May 2022 07:00  --------------------------------------------------------  IN: 200 mL / OUT: 1000 mL / NET: -800 mL    15 May 2022 07:01  -  15 May 2022 10:41  --------------------------------------------------------  IN: 240 mL / OUT: 0 mL / NET: 240 mL        Appearance: Normal	  HEENT:   Normal oral mucosa, PERRL, EOMI	    Cardiovascular: Normal S1 S2, No JVD,   Respiratory: Lungs clear to auscultation	    Gastrointestinal:  Soft, Non-tender, + BS	  	  Neurologic: Non-focal  Extremities:  pvd  dec rom    TELEMETRY: 	    ECG:  	  RADIOLOGY:  OTHER: 	  	  LABS:	 	    CARDIAC MARKERS:                                10.0   7.70  )-----------( 253      ( 15 May 2022 07:37 )             34.9     05-15    134<L>  |  95<L>  |  15  ----------------------------<  125<H>  4.5   |  26  |  4.22<H>    Ca    8.9      15 May 2022 07:37    TPro  6.9  /  Alb  3.3  /  TBili  0.5  /  DBili  x   /  AST  11  /  ALT  6<L>  /  AlkPhos  156<H>  05-15    proBNP:   Lipid Profile:   HgA1c:   TSH:

## 2022-05-16 NOTE — PROGRESS NOTE ADULT - SUBJECTIVE AND OBJECTIVE BOX
[facility]   [phone]  RENAL FOLLOW UP NOTE  --------------------------------------------------------------------------------  Chief Complaint:    24 hour events/subjective:    Patient seen and examined with Dr. Art gordon when seen   son at bedside states he was awake and alert earlier     PAST HISTORY  --------------------------------------------------------------------------------  No significant changes to PMH, PSH, FHx, SHx, unless otherwise noted    ALLERGIES & MEDICATIONS  --------------------------------------------------------------------------------  Allergies    nitrofurantoin (Nephrotoxicity)    Intolerances      Standing Inpatient Medications  allopurinol 100 milliGRAM(s) Oral daily  apixaban 5 milliGRAM(s) Oral two times a day  aspirin enteric coated 81 milliGRAM(s) Oral daily  atorvastatin 40 milliGRAM(s) Oral at bedtime  chlorhexidine 4% Liquid 1 Application(s) Topical <User Schedule>  dextrose 50% Injectable 12.5 Gram(s) IV Push once  dextrose 50% Injectable 25 Gram(s) IV Push once  dextrose 50% Injectable 25 Gram(s) IV Push once  diltiazem    milliGRAM(s) Oral daily  hydrALAZINE 25 milliGRAM(s) Oral three times a day  insulin lispro (ADMELOG) corrective regimen sliding scale   SubCutaneous three times a day before meals  insulin lispro (ADMELOG) corrective regimen sliding scale   SubCutaneous at bedtime  lidocaine   4% Patch 1 Patch Transdermal daily  metoprolol succinate  milliGRAM(s) Oral daily  mupirocin 2% Ointment 1 Application(s) Both Nostrils two times a day  polyethylene glycol 3350 17 Gram(s) Oral two times a day  povidone iodine 10% Solution 1 Application(s) Topical daily  QUEtiapine 25 milliGRAM(s) Oral at bedtime  senna 2 Tablet(s) Oral at bedtime  sevelamer carbonate Powder 1600 milliGRAM(s) Oral three times a day with meals    PRN Inpatient Medications  bisacodyl 5 milliGRAM(s) Oral every 12 hours PRN  dextrose Oral Gel 15 Gram(s) Oral once PRN  sodium chloride 0.9% lock flush 10 milliLiter(s) IV Push every 1 hour PRN      REVIEW OF SYSTEMS  --------------------------------------------------------------------------------    Patient is unable to give ROS    VITALS/PHYSICAL EXAM  --------------------------------------------------------------------------------  T(C): 36.6 (05-16-22 @ 14:23), Max: 36.6 (05-15-22 @ 19:51)  HR: 93 (05-16-22 @ 14:23) (73 - 109)  BP: 162/71 (05-16-22 @ 14:23) (133/67 - 171/53)  RR: 18 (05-16-22 @ 14:23) (18 - 18)  SpO2: 99% (05-16-22 @ 14:23) (92% - 99%)  Wt(kg): --        05-15-22 @ 07:01  -  05-16-22 @ 07:00  --------------------------------------------------------  IN: 480 mL / OUT: 0 mL / NET: 480 mL    05-16-22 @ 07:01  -  05-16-22 @ 15:33  --------------------------------------------------------  IN: 480 mL / OUT: 0 mL / NET: 480 mL      Physical Exam:  	              Gen: appears comfortable  	Pulm: Decreased breath sounds b/l bases.  	CV: No JVD. IRR,   	Abd: +BS, soft, nontender, softly distended, obese               Extremity no edema              Extremity LE: + hyperpigmented bilateral LE with no edema              Vascular: R IJ HD catheter, L arm AVF     LABS/STUDIES  --------------------------------------------------------------------------------              9.8    9.22  >-----------<  288      [05-16-22 @ 06:30]              34.4     135  |  95  |  26  ----------------------------<  198      [05-16-22 @ 06:28]  4.3   |  26  |  5.98        Ca     9.2     [05-16-22 @ 06:28]    TPro  6.9  /  Alb  3.3  /  TBili  0.5  /  DBili  x   /  AST  11  /  ALT  6   /  AlkPhos  156  [05-15-22 @ 07:37]          Creatinine Trend:  SCr 5.98 [05-16 @ 06:28]  SCr 4.22 [05-15 @ 07:37]  SCr 7.34 [05-12 @ 17:15]  SCr 8.68 [05-10 @ 15:00]  SCr 5.39 [05-08 @ 10:30]                  Iron 16, TIBC 239, %sat 7      [04-15-22 @ 10:16]  Ferritin 172      [04-15-22 @ 09:05]  PTH -- (Ca 8.3)      [04-15-22 @ 12:18]   150  PTH -- (Ca --)      [04-03-22 @ 23:06]   97  HbA1c 11.7      [11-07-19 @ 09:14]  TSH 1.71      [05-12-22 @ 07:11]

## 2022-05-16 NOTE — PROGRESS NOTE ADULT - SUBJECTIVE AND OBJECTIVE BOX
DATE OF SERVICE: 05-16-22 @ 10:56  CHIEF COMPLAINT:Patient is a 67y old  Male who presents with a chief complaint of Decreased urine output for the past week, leg oedema (16 May 2022 09:02)    	        PAST MEDICAL & SURGICAL HISTORY:  HTN (hypertension), benign      HLD (hyperlipidemia)      DM type 2 (diabetes mellitus, type 2)      TIA (transient ischemic attack)      Atrial fibrillation      MI (myocardial infarction)  circa 2014 and 2022.      CAD (coronary artery disease)      Neuropathy      Stage 3 chronic kidney disease      2019 novel coronavirus disease (COVID-19)  12/2021.  Received the COVID-19 vaccine x 2 as of April 2022.      Status post angioplasty with stent  LULU x 3 2/7/2014      S/P carotid endarterectomy  left              REVIEW OF SYSTEMS:      RESPIRATORY: No cough, wheezing, chills or hemoptysis; No Shortness of Breath  CARDIOVASCULAR: No chest pain, palpitations, passing out, dizziness, or leg swelling  GASTROINTESTINAL: No abdominal or epigastric pain.  GENITOURINARY: No dysuria, frequency, hematuria, or incontinence  NEUROLOGICAL: No headaches,     Medications:  MEDICATIONS  (STANDING):  allopurinol 100 milliGRAM(s) Oral daily  apixaban 5 milliGRAM(s) Oral two times a day  aspirin enteric coated 81 milliGRAM(s) Oral daily  atorvastatin 40 milliGRAM(s) Oral at bedtime  chlorhexidine 4% Liquid 1 Application(s) Topical <User Schedule>  dextrose 50% Injectable 25 Gram(s) IV Push once  dextrose 50% Injectable 25 Gram(s) IV Push once  dextrose 50% Injectable 12.5 Gram(s) IV Push once  diltiazem    milliGRAM(s) Oral daily  hydrALAZINE 25 milliGRAM(s) Oral three times a day  insulin lispro (ADMELOG) corrective regimen sliding scale   SubCutaneous three times a day before meals  insulin lispro (ADMELOG) corrective regimen sliding scale   SubCutaneous at bedtime  lidocaine   4% Patch 1 Patch Transdermal daily  metoprolol succinate  milliGRAM(s) Oral daily  mupirocin 2% Ointment 1 Application(s) Both Nostrils two times a day  polyethylene glycol 3350 17 Gram(s) Oral two times a day  povidone iodine 10% Solution 1 Application(s) Topical daily  QUEtiapine 25 milliGRAM(s) Oral at bedtime  senna 2 Tablet(s) Oral at bedtime  sevelamer carbonate Powder 1600 milliGRAM(s) Oral three times a day with meals    MEDICATIONS  (PRN):  bisacodyl 5 milliGRAM(s) Oral every 12 hours PRN Constipation  dextrose Oral Gel 15 Gram(s) Oral once PRN Blood Glucose LESS THAN 70 milliGRAM(s)/deciliter  sodium chloride 0.9% lock flush 10 milliLiter(s) IV Push every 1 hour PRN Pre/post blood products, medications, blood draw, and to maintain line patency    	    PHYSICAL EXAM:  T(C): 36.5 (05-16-22 @ 04:35), Max: 36.6 (05-15-22 @ 19:51)  HR: 109 (05-16-22 @ 04:35) (73 - 109)  BP: 148/67 (05-16-22 @ 04:35) (148/67 - 171/53)  RR: 18 (05-16-22 @ 04:35) (18 - 18)  SpO2: 96% (05-16-22 @ 04:35) (92% - 96%)  Wt(kg): --  I&O's Summary    15 May 2022 07:01  -  16 May 2022 07:00  --------------------------------------------------------  IN: 480 mL / OUT: 0 mL / NET: 480 mL    16 May 2022 07:01  -  16 May 2022 10:56  --------------------------------------------------------  IN: 240 mL / OUT: 0 mL / NET: 240 mL        Appearance: Normal	  HEENT:   Normal oral mucosa, PERRL, EOMI	    Cardiovascular: Normal S1 S2,  Respiratory: Lungs clear to auscultation	  Gastrointestinal:  Soft, Non-tender, + BS	    Neurologic: Non-focal  Extremities: dec rom  pvd  TELEMETRY: 	    ECG:  	  RADIOLOGY:  OTHER: 	  	  LABS:	 	    CARDIAC MARKERS:                                9.8    9.22  )-----------( 288      ( 16 May 2022 06:30 )             34.4     05-16    135  |  95<L>  |  26<H>  ----------------------------<  198<H>  4.3   |  26  |  5.98<H>    Ca    9.2      16 May 2022 06:28    TPro  6.9  /  Alb  3.3  /  TBili  0.5  /  DBili  x   /  AST  11  /  ALT  6<L>  /  AlkPhos  156<H>  05-15    proBNP:   Lipid Profile:   HgA1c:   TSH:

## 2022-05-16 NOTE — PROGRESS NOTE ADULT - SUBJECTIVE AND OBJECTIVE BOX
CARDIOLOGY FOLLOW UP - Dr. Mazariegos  DATE OF SERVICE: 5/16/22     CC no cp or sob       REVIEW OF SYSTEMS:  CONSTITUTIONAL: No fever, weight loss, or fatigue  RESPIRATORY: No cough, wheezing, chills or hemoptysis; No Shortness of Breath  CARDIOVASCULAR: No chest pain, palpitations, passing out, dizziness, or leg swelling  GASTROINTESTINAL: No abdominal or epigastric pain. No nausea, vomiting, or hematemesis; No diarrhea or constipation. No melena or hematochezia.  VASCULAR: No edema     PHYSICAL EXAM:  T(C): 36.6 (05-16-22 @ 11:31), Max: 36.6 (05-15-22 @ 19:51)  HR: 82 (05-16-22 @ 11:31) (73 - 109)  BP: 133/67 (05-16-22 @ 11:31) (133/67 - 171/53)  RR: 18 (05-16-22 @ 11:31) (18 - 18)  SpO2: 97% (05-16-22 @ 11:31) (92% - 97%)  Wt(kg): --  I&O's Summary    15 May 2022 07:01  -  16 May 2022 07:00  --------------------------------------------------------  IN: 480 mL / OUT: 0 mL / NET: 480 mL    16 May 2022 07:01  -  16 May 2022 12:43  --------------------------------------------------------  IN: 240 mL / OUT: 0 mL / NET: 240 mL        Appearance: Normal	  Cardiovascular: Normal S1 S2,RRR, No JVD, No murmurs  Respiratory: Lungs clear to auscultation	  Gastrointestinal:  Soft, Non-tender, + BS	  Extremities: Normal range of motion, No clubbing, cyanosis or edema      Home Medications:  allopurinol 100 mg oral tablet: 1 tab(s) orally once a day (03 Apr 2022 06:34)  aspirin 81 mg oral delayed release tablet: 1 tab(s) orally once a day (03 Apr 2022 06:34)  bumetanide 2 mg oral tablet: 1 tab(s) orally once a day (03 Apr 2022 06:34)  insulin glargine 100 units/mL subcutaneous solution: 25 unit(s) subcutaneous once a day (at bedtime) (03 Apr 2022 06:34)  metOLazone 5 mg oral tablet: 1 tab(s) orally 3 times a week (03 Apr 2022 06:34)  metoprolol succinate 50 mg oral tablet, extended release: 2 tab(s) orally once a day (03 Apr 2022 06:34)  NovoLOG 100 units/mL subcutaneous solution: subcutaneous 4 times a day (before meals and at bedtime) (03 Apr 2022 06:34)  NovoLOG FlexPen 100 units/mL injectable solution: 10 unit(s) subcutaneous 3 times a day (03 Apr 2022 06:34)  oxycodone-acetaminophen 5 mg-325 mg oral tablet: 1 tab(s) orally 2 times a day (03 Apr 2022 06:34)  Pradaxa 150 mg oral capsule: 1 cap(s) orally 2 times a day (03 Apr 2022 06:34)  pregabalin 100 mg oral capsule: 1 cap(s) orally 3 times a day (03 Apr 2022 06:34)      MEDICATIONS  (STANDING):  allopurinol 100 milliGRAM(s) Oral daily  apixaban 5 milliGRAM(s) Oral two times a day  aspirin enteric coated 81 milliGRAM(s) Oral daily  atorvastatin 40 milliGRAM(s) Oral at bedtime  chlorhexidine 4% Liquid 1 Application(s) Topical <User Schedule>  dextrose 50% Injectable 25 Gram(s) IV Push once  dextrose 50% Injectable 25 Gram(s) IV Push once  dextrose 50% Injectable 12.5 Gram(s) IV Push once  diltiazem    milliGRAM(s) Oral daily  hydrALAZINE 25 milliGRAM(s) Oral three times a day  insulin lispro (ADMELOG) corrective regimen sliding scale   SubCutaneous at bedtime  insulin lispro (ADMELOG) corrective regimen sliding scale   SubCutaneous three times a day before meals  lidocaine   4% Patch 1 Patch Transdermal daily  metoprolol succinate  milliGRAM(s) Oral daily  mupirocin 2% Ointment 1 Application(s) Both Nostrils two times a day  polyethylene glycol 3350 17 Gram(s) Oral two times a day  povidone iodine 10% Solution 1 Application(s) Topical daily  QUEtiapine 25 milliGRAM(s) Oral at bedtime  senna 2 Tablet(s) Oral at bedtime  sevelamer carbonate Powder 1600 milliGRAM(s) Oral three times a day with meals      TELEMETRY: afib hr 71 	    ECG:  	  RADIOLOGY:   < from: MR Angio Neck No Cont (05.14.22 @ 14:12) >    ACC: [id number] EXAM:  MR ANGIO NECK                        ACC: [id number] EXAM:  MR ANGIO BRAIN                          PROCEDURE DATE:  05/14/2022          INTERPRETATION:  Clinical indication: Small scattered infarcts,   encephalopathy, end-stage renal disease    MR angiography of the neck and head was attempted. The patient was unable   to continue.    MRA of the neck is limited. Recommend CT angiography for further   information.    There is suboptimal evaluation of the neck. In the intracranial   compartment there is no antegrade flow identified in the cervical,   petrous cavernous or supraclinoid internal carotid artery. There is   visualization of the left middle cerebral artery which may be related to   supply from the left posteriorcommunicating artery. Additionally, the   left anterior cerebral artery is supplied across the midline from the   right anterior communicating artery and right anterior cerebral artery.   There is apparent decreased flow in the distal right M1 segment of the   middle cerebral artery. This may be artifactual.    IMPRESSION: Limited incomplete study as the patient could not cooperate.   There is apparent occlusion of the left internal carotid artery with   cross filling of the left anterior cerebral artery from the anterior   communicating artery and the left middle cerebral artery from the left   posterior communicating artery. Recommend CT angiography.    --- End of Report ---      < end of copied text >      DIAGNOSTIC TESTING:  [ ] Echocardiogram:  [ ]  Catheterization:  [ ] Stress Test:    OTHER: 	    LABS:	 	                            9.8    9.22  )-----------( 288      ( 16 May 2022 06:30 )             34.4     05-16    135  |  95<L>  |  26<H>  ----------------------------<  198<H>  4.3   |  26  |  5.98<H>    Ca    9.2      16 May 2022 06:28    TPro  6.9  /  Alb  3.3  /  TBili  0.5  /  DBili  x   /  AST  11  /  ALT  6<L>  /  AlkPhos  156<H>  05-15

## 2022-05-16 NOTE — PROGRESS NOTE ADULT - PROBLEM SELECTOR PLAN 3
I Edward Pinto MD have personally  seen and examined the patient today and have noted the findings and formulated the plan of care.  I Edward Pinto MD have personally seen and examined the patient at bedside today at 2pm

## 2022-05-16 NOTE — PROGRESS NOTE ADULT - ASSESSMENT
67M PMH of CAD, NSTEMI s/p 3 stents in 2014 (stents to LAD, proximal and distal LCx), ischemic cardiomyopathy, HTN for 10 years, T2DM for 26 years c/b peripheral neuropathy, CVA, TIA, Afib on Pradaxa, chronic RLE wound, L carotid stenosis s/p endarterectomy (2014) just recently admitted  to the [facility] on 2/19/2022 with acute onset chest pain for one day found to have an NSTEMI, CAD, s/p PCI in setting of increased AF rates off bb for 3 days and resolved chest pain, his CKMB peaked and Echo noted with EF 60%,normal LV function, mild TR, mild LA. He was s/p C with 85% proximal LAD s/p balloon angioplasty and LULU. He presented to [facility] ED with decreased urine output over the week. Carotid duplex showed Calcified atherosclerotic disease seen throughout the carotid systems. There is new occlusion of the left internal carotid artery. There is greater than 70% stenosis involving the distal right common carotid artery as was seen previously. Mild to moderate flow-limiting stenosis is seen at the left external carotid artery. Vascular surgery consulted for carotid stenosis. Pt currently denies vision changes, dizziness, lightheadedness or any other symptoms at this point in time.     PLAN:   continue current optimazation  d/w pt and family  to f/u as outpt in 4-6 weeks and at that time will need mri brain w gado  for cva surveillance   and repeat mra  of neck for rt ica stenosis  will d/c eli danielson staples  f/u as outpt  reconsult prn

## 2022-05-16 NOTE — PROGRESS NOTE ADULT - SUBJECTIVE AND OBJECTIVE BOX
Admitting Diagnosis:  Chronic kidney disease [N18.9]  CHRONIC KIDNEY DISEASE, UNSPECIFIED    Background:  This is a 67 year old gentleman pmhx CAD s/p MI/PCI/CABG, hx TIA, afib on rivaroxaban, HTN, DM2, PVD, gout, L CEA 2014, and CKD who presented to [facility] 4/11/22 with ANT, tremors, confusion and lethargy.  CT head no acute changes.  S/p initiation of hemodialysis.  Mental status has improved dramatically.  Pt denies any headaches, no slurred speech, no hemiparesis.  He has chronic gout with bilateral finger swelling, pain.  He also has chronic neuropathy.  Both legs are weak.    pt in bed, feeling better as per pt and son    Past Medical History:  HTN (hypertension), benign [401.1]  HLD (hyperlipidemia) [272.4]  DM type 2 (diabetes mellitus, type 2) [250.00]  TIA (transient ischemic attack) [435.9]  Atrial fibrillation [427.31]  MI (myocardial infarction) [410.90]  circa 2014 and 2022.  CAD (coronary artery disease) [414.00]  Neuropathy [G62.9]  Stage 3 chronic kidney disease [N18.30]  2019 novel coronavirus disease (COVID-19) [U07.1]  12/2021.  Received the COVID-19 vaccine x 2 as of April 2022.    Past Surgical History:  Status post angioplasty with stent [V45.82]  LULU x 3 2/7/2014  S/P carotid endarterectomy [Z98.89]  left  S/P CABG (coronary artery bypass graft) [Z95.1]        Social History:  No toxic habits    Family History:  FAMILY HISTORY:  Family history of heart disease          ROS: as per HPI.      Medications:  allopurinol 100 milliGRAM(s) Oral daily  apixaban 5 milliGRAM(s) Oral two times a day  aspirin enteric coated 81 milliGRAM(s) Oral daily  atorvastatin 40 milliGRAM(s) Oral at bedtime  bisacodyl 5 milliGRAM(s) Oral every 12 hours PRN  chlorhexidine 4% Liquid 1 Application(s) Topical <User Schedule>  dextrose 50% Injectable 25 Gram(s) IV Push once  dextrose 50% Injectable 25 Gram(s) IV Push once  dextrose 50% Injectable 12.5 Gram(s) IV Push once  dextrose Oral Gel 15 Gram(s) Oral once PRN  diltiazem    milliGRAM(s) Oral daily  hydrALAZINE 25 milliGRAM(s) Oral three times a day  insulin lispro (ADMELOG) corrective regimen sliding scale   SubCutaneous at bedtime  insulin lispro (ADMELOG) corrective regimen sliding scale   SubCutaneous three times a day before meals  lidocaine   4% Patch 1 Patch Transdermal daily  metoprolol succinate  milliGRAM(s) Oral daily  mupirocin 2% Ointment 1 Application(s) Both Nostrils two times a day  polyethylene glycol 3350 17 Gram(s) Oral two times a day  povidone iodine 10% Solution 1 Application(s) Topical daily  QUEtiapine 25 milliGRAM(s) Oral at bedtime  senna 2 Tablet(s) Oral at bedtime  sevelamer carbonate Powder 1600 milliGRAM(s) Oral three times a day with meals  sodium chloride 0.9% lock flush 10 milliLiter(s) IV Push every 1 hour PRN      Labs:  CBC Full  -  ( 16 May 2022 06:30 )  WBC Count : 9.22 K/uL  RBC Count : 4.24 M/uL  Hemoglobin : 9.8 g/dL  Hematocrit : 34.4 %  Platelet Count - Automated : 288 K/uL  Mean Cell Volume : 81.1 fl  Mean Cell Hemoglobin : 23.1 pg  Mean Cell Hemoglobin Concentration : 28.5 gm/dL  Auto Neutrophil # : x  Auto Lymphocyte # : x  Auto Monocyte # : x  Auto Eosinophil # : x  Auto Basophil # : x  Auto Neutrophil % : x  Auto Lymphocyte % : x  Auto Monocyte % : x  Auto Eosinophil % : x  Auto Basophil % : x    05-16    135  |  95<L>  |  26<H>  ----------------------------<  198<H>  4.3   |  26  |  5.98<H>    Ca    9.2      16 May 2022 06:28    TPro  6.9  /  Alb  3.3  /  TBili  0.5  /  DBili  x   /  AST  11  /  ALT  6<L>  /  AlkPhos  156<H>  05-15    CAPILLARY BLOOD GLUCOSE      POCT Blood Glucose.: 197 mg/dL (16 May 2022 08:05)  POCT Blood Glucose.: 226 mg/dL (15 May 2022 21:48)  POCT Blood Glucose.: 226 mg/dL (15 May 2022 17:31)  POCT Blood Glucose.: 195 mg/dL (15 May 2022 12:10)    LIVER FUNCTIONS - ( 15 May 2022 07:37 )  Alb: 3.3 g/dL / Pro: 6.9 g/dL / ALK PHOS: 156 U/L / ALT: 6 U/L / AST: 11 U/L / GGT: x                   Vitals:  Vital Signs Last 24 Hrs  T(C): 36.5 (16 May 2022 04:35), Max: 36.6 (15 May 2022 19:51)  T(F): 97.7 (16 May 2022 04:35), Max: 97.9 (15 May 2022 19:51)  HR: 109 (16 May 2022 04:35) (73 - 109)  BP: 148/67 (16 May 2022 04:35) (135/72 - 171/53)  BP(mean): --  RR: 18 (16 May 2022 04:35) (18 - 18)  SpO2: 96% (16 May 2022 04:35) (92% - 96%)      NEUROLOGICAL EXAM:    Mental status: awake alert, and in no apparent distress. Oriented to person, knows he is in a hospital but not which one, year 2022 something, when asked president says who cares and would not try to guess   Language intact.  Attention impaired.      Cranial Nerves: Pupils were equal, round, reactive to light. Extraocular movements were intact. B BTT Facial sensation was intact to light touch. There was no facial asymmetry. The palate was upgoing symmetrically and tongue was midline. Hearing acuity was intact to finger rub AU. Shoulder shrug was full bilaterally    Motor exam: Bulk and tone were normal. moves all ext.  Fine finger movements were symmetric. There was bilateral symmetric pronator drift    Reflexes: DTRs depressed, flexor plantars.     Sensation: Intact to noxious, seems intact to light touch, due to decreased attention did not assess other modalities     Coordination: no gross dysmetria    Gait: deferred    NIHSS: 13    Imaging:    ACC: [id number] EXAM:  CT BRAIN                        PROCEDURE DATE:  04/09/2022      INTERPRETATION:  CLINICAL INFORMATION:  Altered mental status, on   anticoagulation .    TECHNIQUE: Multiple contiguous axial images were acquired from the   skullbase to the vertex without the administration of intravenous   contrast.  Coronal and sagittal reformations were made.    COMPARISON: CT head 10/21/2021, brain MRI 02/23/2014.    FINDINGS:    Scattered lacunar infarcts in the bilateral cerebralhemispheres are   grossly stable compared to the 10/21/2021 head CT. No new additional   infarcts are noted over the time interval.    No acute intracranial hemorrhage, mass effect, or midline shift. The   brain demonstrates periventricular hypoattenuation, nonspecific in   etiology but likely representing chronic microvascular ischemic changes.    No abnormal extra-axial fluid collections are present.    The ventricles and sulci demonstrate age appropriate involutional   changes.  The basal cisterns are patent.    The orbits are unremarkable. The paranasal sinuses are clear. The mastoid   air cells are clear. The calvarium is intact.    IMPRESSION:    No acute intracranial abnormality is noted. If the patient has new and   persistent symptoms, consider short interval follow-up head CT or brain   MRI.    --- End of Report ---    GATITO VILLARREAL MD; Resident Radiologist  This document has been electronically signed.  JESSE CORONA MD; Attending Radiologist  This document has been electronically signed. Apr 9 2022  2:00PM        ACC: [id number] EXAM:  MR BRAIN                          PROCEDURE DATE:  05/11/2022          INTERPRETATION:  MR brain  without gadolinium    CLINICAL INFORMATION: Stroke.    TECHNIQUE: Limited Sagittal T1-weighted images, axial FLAIR images, and   axial diffusion weighted images of the brain were obtained.  Patient was   unable to hold still for remaining sequences.    FINDINGS:   MRI dated 02/23/2014 available for review.    The brain demonstrates small acute infarctions in the BILATERAL posterior   centrum semiovale and LEFT corona radiata and LEFT posterior   periventricular white matter with restricted diffusion. No intracranial   hemorrhage is recognized. No mass effect is found in the brain.    The ventricles, sulci and basal cisterns show mild global atrophy most   prominent within the BILATERAL cerebellum.    The vertebral and internal carotid arteries demonstrate expected flow   voids indicating their patency.    The orbits are unremarkable.  The paranasal sinuses are clear.  The nasal   cavity appears intact.  The nasopharynx is symmetric.  The central skull   base and temporal bones are intact.  The calvarium appears unremarkable.    IMPRESSION: Small acute infarctions in the BILATERAL posterior centrum   semiovale and LEFT corona radiata and LEFT posterior periventricular   white matter with restricted diffusion.   mild global atrophy most   prominent within the BILATERAL cerebellum.    --- End of Report ---            JESÚS PADGETT MD; Attending Radiologist  This document has been electronically signed. May 11 2022  5:47PM        Patient name: DYLAN DEL CASTILLO  YOB: 1955   Age: 67 (M)   MR#: [id number]  Study Date: 2/22/2022  [facility]  Study quality: Technically difficult  Referring Physician: Mendoza Corral MD  Blood Pressure: 152/82 mmHg  Height: 180 cm  Weight: 136 kg  BSA: 2.5 m2  ------------------------------------------------------------------------  PROCEDURE: Transthoracic echocardiogram with 2-D, M-Mode  and complete spectral and color flow Doppler. Verbal  consent was obtained for injection of  Ultrasonic Enhancing  Agent following a discussion of risks and benefits.  Following intravenous injection of Ultrasonic Enhancing  Agent , harmonic imaging was performed.  INDICATION: Chest pain, unspecified (R07.9)  ------------------------------------------------------------------------  Dimensions:    Normal Values:  LA:     4.4    2.0 - 4.0 cm  Ao:     4.0    2.0 - 3.8 cm  SEPTUM: 1.1    0.6 - 1.2 cm  PWT:    0.9    0.6 - 1.1 cm  LVIDd:  5.0    3.0 - 5.6 cm  LVIDs:  3.5    1.8 - 4.0 cm  Derived variables:  LVMI: 73 g/m2  RWT: 0.36  EF (Visual Estimate): 60 %  Doppler Peak Velocity (m/sec): AoV=1.5 TV=2.2  ------------------------------------------------------------------------  Observations:  Mitral Valve: Normal mitral valve.  Aortic Valve/Aorta: Calcified aortic valve with normal  opening. Minimal aortic regurgitation.  Normal aortic root size.  Left Atrium: Mildly dilated left atrium.  Left Ventricle: Endocardial visualization enhanced with  intravenous injection of Ultrasonic Enhancing Agent  (Definity).  Normal left ventricular internal dimensions and wall  thickness.  Normal left ventricular systolic function. No segmental  wall motion abnormalities.  Right Heart: Normal right atrium. Normal right ventricular  size and function.  Normal tricuspid valve. Mild tricuspid regurgitation.  Normal pulmonic valve. Mild pulmonic regurgitation.  Pericardium/Pleura: Normal pericardium with no pericardial  effusion.  Hemodynamic: No evidence of pulmonary hypertension.  ------------------------------------------------------------------------  Conclusions:  Endocardial visualization enhanced with intravenous  injection of Ultrasonic Enhancing Agent (Definity).  Normal left ventricular systolic function. No segmental  wall motion abnormalities.  ------------------------------------------------------------------------  Confirmed on  2/22/2022 - 16:17:39 by Ishan Ackerman MD, FASE  ------------------------------------------------------------------------    < from: CT Angio Head w/ IV Cont (05.12.22 @ 13:34) >    ACC: [id number] EXAM:  CT ANGIO NECK (W)AW IC                        ACC: [id number] EXAM:  CT ANGIO BRAIN (W)AW IC                          PROCEDURE DATE:  05/12/2022          INTERPRETATION:  CT angiography of the brain and neck    CLINICAL INDICATION: Recent infarcts. Carotid stenosis.    TECHNIQUE: Direct axial CT scanning of the brain and neck was obtained   from the vertex to the level of the clavicular heads after the dynamic   intravenous injection of 44 cc of Omnipaque 300. 0 cc were discarded.   Sagittal and coronal maximum intensity projection reformats were   provided.  Three-dimensional reconstructions were performed by the   radiologist using the [employer] workstation.    Additionally, noncontrast axial CT scanning of the brain was obtained   from the skull base to the vertex. Sagittal and coronal reformats were   provided.    COMPARISON: MRA neck 10/24/2021    FINDINGS:    CT brain:    Previously seen acute infarcts in the bilateral cerebral hemispheres are   redemonstrated. No CT evidence for hemorrhagic transformation.    No hydrocephalus, midline shift, or effacement of basal cisterns.    No acute intracranial hemorrhage or vasogenic edema.    Mild to moderate white matter microvascular ischemic disease.    CTA brain:    Limited by relatively decreased opacification of the arteries.    Multifocal narrowing of the right skull base ICA due to calcified plaque,   the degree of which is not well evaluated.    Normal flow-related enhancement of the supraclinoid right ICA.    Lack of flow related enhancement of the petrous, precavernous, and   cavernous left ICA. Reconstitution of the vessel at its supraclinoid   segment.    Otherwise no flow-limiting stenosis.    Normal flow-related enhancement of the bilateral anterior, middle, and   posterior cerebral arteries.    Normal flow-related enhancement of the intradural vertebral arteries and   basilar artery.    No vascular aneurysm or AVM.    CTA neck:    Stenoses described in this report are on the basis of NASCET criteria.    Study is significantly limited by relatively decreased opacification of   the arteries.    Short segment stenosis of the mid left common carotid artery due to the   presence of partially calcified plaque is poorly evaluated. Flow-related   enhancement of the more proximal and distal left common carotid artery is   noted.    Long segment stenosis of the mid and distal right common carotid artery   due to the presence of partially calcified plaque is poorly evaluated.    Rapidly progressive stenosis of the left internal carotid artery   beginning at its origin. No flow related enhancement of the vessel beyond   its origin.    Normal flow-related enhancement of the bilateral vertebral arteries.    No gross evidence for acute arterial dissection.    IMPRESSION:    CTA brain:  Limited by relatively decreased opacification of the arteries.    Multifocal narrowing of the right skull base ICA due to calcified plaque,   the degree of which is not well evaluated.    Lack of flow related enhancement of the petrous, precavernous, and   cavernous left ICA. Reconstitution of the vessel at its supraclinoid   segment.    Otherwise no flow-limiting stenosis.    No vascular aneurysm or AVM.    CTA neck:  Limited by relatively decreased opacification of the arteries.    Short segment stenosis of the mid left common carotid artery due to the   presence of partially calcified plaque is poorly evaluated. Flow-related   enhancement of the more proximal and distal left common carotid artery is   noted.    Long segment stenosis of the mid and distal right common carotid artery   due to the presence of partially calcified plaque is poorly evaluated.    Rapidly progressive stenosis of the left internal carotid artery   beginning at its origin. No flow related enhancement of the vessel beyond   its origin.    Recommend correlation with contrast-enhanced MRI of the neck for further   evaluation.    --- End of Report ---            MIGUEL ANGEL CARRILLO MD; Attending Radiologist  This document has been electronically signed. May 12 2022  2:47PM    < end of copied text >

## 2022-05-16 NOTE — PROGRESS NOTE ADULT - ASSESSMENT
A/P    68 y/o M with history of CAD, s/p multiple PCI, with most recent 2/22 PCI of LAD in setting of NSTEMI, on ASA only (pradaxa), chronic atrial fibrillation maintained on Pradaxa, essential HTN, type 2 DM previously on oral medications but on insulin, diabetic neuropathy with chronic RIGHT LE venous stasis changes>left, LEFT foot ulcer seen by podiatry, past endarterectomy LEFT CEA in 2014 presenting with 1 week of decreased urine output, cystitis with hematuria     #ANT on CKD, new HD  -oliguric renal failure in setting of UTI/cystitis  -New HD for uremia, renal failure  -sp rrt 4/12 for uncontrolled bleeding sp  right groin shiley removal  - monitor h/h - stable  -s/p L AVG 4/18  -s/p permacath placement 4/20  -renal f/ u    #AMS/Lethargy  -recent ams from pain meds  -AMS sp RRT 4/27  likely secondary to pain med   -repeat CT 4/27 unremarkable --  discontinued Percocet  -MRI 5/11  revealed small acute infarctions in bilateral posterior centrum semiovale and left corona radiata and left posterior periventricular white matter  -on asa statin  -repeat echo with no interval changes  -CTa head neck noted; neuro following     #Acute on chronic HFpEF  -stable  -continue volume removal with HD  -c/w bb  -repeat echo with stable borderline lv fxn    #Chronic AF  -rates stable   -c/w metoprolol succ 100 mg qd  -c/w dilt cd 120mg daily    -brief pause noted- likely secondary to MOR  -c/w eliquis 5 mg BID for now - heme stable     #HTN  -stable  -c/w meds       #CAD, s/p PCI, most recent 2/2022  -stable, cont asa  -off plavix due to a/c indication  -statin     #carotid stenosis   -previous MRA  noted with Greater than 75% stenosis of the right distal common carotid artery. Approximately 60% stenosis of the proximal left internal carotid artery.  -carotid dopplers 5/11 noted :  There is new occlusion of the left internal carotid artery;  greater than 70% stenosis involving the distal right common carotid artery as was seen previously. Mild to moderate flow-limiting stenosis is seen at the left external   carotid artery.  -CTa head and neck 5/12 noted  -Continue ASA and Statin Therapy  -neuro fu  / work up / imaging per vascular   -mra head/ neck noted- per vascular outpt follow up for repeat to re eval w MRI / mra of the brain neck

## 2022-05-16 NOTE — PROGRESS NOTE ADULT - SUBJECTIVE AND OBJECTIVE BOX
Patient is a 67y old  Male who presents with a chief complaint of Decreased urine output for the past week, leg oedema (16 May 2022 15:33)      Vascular Surgery Attending Progress Note    Interval HPI: pt w/o new c/o     Medications:  allopurinol 100 milliGRAM(s) Oral daily  apixaban 5 milliGRAM(s) Oral two times a day  aspirin enteric coated 81 milliGRAM(s) Oral daily  atorvastatin 40 milliGRAM(s) Oral at bedtime  bisacodyl 5 milliGRAM(s) Oral every 12 hours PRN  chlorhexidine 4% Liquid 1 Application(s) Topical <User Schedule>  dextrose 50% Injectable 25 Gram(s) IV Push once  dextrose 50% Injectable 12.5 Gram(s) IV Push once  dextrose 50% Injectable 25 Gram(s) IV Push once  dextrose Oral Gel 15 Gram(s) Oral once PRN  diltiazem    milliGRAM(s) Oral daily  hydrALAZINE 25 milliGRAM(s) Oral three times a day  insulin lispro (ADMELOG) corrective regimen sliding scale   SubCutaneous three times a day before meals  insulin lispro (ADMELOG) corrective regimen sliding scale   SubCutaneous at bedtime  lidocaine   4% Patch 1 Patch Transdermal daily  metoprolol succinate  milliGRAM(s) Oral daily  mupirocin 2% Ointment 1 Application(s) Both Nostrils two times a day  polyethylene glycol 3350 17 Gram(s) Oral two times a day  povidone iodine 10% Solution 1 Application(s) Topical daily  QUEtiapine 25 milliGRAM(s) Oral at bedtime  senna 2 Tablet(s) Oral at bedtime  sevelamer carbonate Powder 1600 milliGRAM(s) Oral three times a day with meals  sodium chloride 0.9% lock flush 10 milliLiter(s) IV Push every 1 hour PRN      Vital Signs Last 24 Hrs  T(C): 36.6 (16 May 2022 14:23), Max: 36.6 (15 May 2022 19:51)  T(F): 97.9 (16 May 2022 14:23), Max: 97.9 (15 May 2022 19:51)  HR: 93 (16 May 2022 14:23) (73 - 109)  BP: 162/71 (16 May 2022 14:23) (133/67 - 171/53)  BP(mean): --  RR: 18 (16 May 2022 14:23) (18 - 18)  SpO2: 99% (16 May 2022 14:23) (92% - 99%)  I&O's Summary    15 May 2022 07:01  -  16 May 2022 07:00  --------------------------------------------------------  IN: 480 mL / OUT: 0 mL / NET: 480 mL    16 May 2022 07:01  -  16 May 2022 16:56  --------------------------------------------------------  IN: 480 mL / OUT: 0 mL / NET: 480 mL        Physical Exam:  Neuro  A&Ox3 VSS  Vascular:  stable  lue avf w good bruit and thrill     LABS:                        9.8    9.22  )-----------( 288      ( 16 May 2022 06:30 )             34.4     05-16    135  |  95<L>  |  26<H>  ----------------------------<  198<H>  4.3   |  26  |  5.98<H>    Ca    9.2      16 May 2022 06:28    TPro  6.9  /  Alb  3.3  /  TBili  0.5  /  DBili  x   /  AST  11  /  ALT  6<L>  /  AlkPhos  156<H>  05-15        UMAIR MCGRAW MD  [phone] [phone]

## 2022-05-16 NOTE — PROGRESS NOTE ADULT - PROBLEM SELECTOR PLAN 1
I Edward Pinto MD have personally  seen and examined the patient today and have noted the findings and formulated the plan of care.  I Edward Pinto MD have personally seen and examined the patient at bedside today a 2pm

## 2022-05-16 NOTE — PROGRESS NOTE ADULT - ASSESSMENT
Impression:  This is a 67 year old gentleman pmhx CAD s/p MI/PCI/CABG, hx TIA, afib on rivaroxaban, HTN, DM2, PVD, gout, L CEA 2014, and CKD who presented to [facility] 4/11/22 with ANT, tremors, confusion and lethargy.  CT head no acute changes.  S/p initiation of hemodialysis.  Mental status has improved dramatically.  Pt denies any headaches, no slurred speech, no hemiparesis.  He has chronic gout with bilateral finger swelling, pain.  He also has chronic neuropathy.  Both legs are weak.      Persistent encephalopathy prompted re-consultation on 5/10/22.  MRI brain was ordered and revealed small acute infarctions in bilateral posterior centrum semiovale and left corona radiata and left posterior periventricular white matter.  Carotid ultrasound was performed showing new occlusion of the left internal carotid artery, along with greater than 70% stenosis involving the distal right common carotid artery, and mild to moderate flow-limiting stenosis is seen at the left external carotid artery.  Vascular surgery has been consulted and is recommending CTA.  He continues to be encephalopathic and lethargic.      Diagnosis and Recommendations:  Small scattered infarcts  - Infarcts not causing encephalopathy but likely reflective of fact that brain is being perfused by right CCA/ICA that in itself shows severe stenosis.    -- MRA was suboptimal not visualing the neck but MRA head showed left KESHAWN coming from ACOM and left PCA feeding left MCA.  ICA showed no flow.    - in setting of atrial fibrillation may well be cardioembolic, continue anticoagulation as per cardiology  -   s/p carotid doppler, suboptimal CTA/MRA.  However, it is apparent that there is a proximal left ICA occlusion, which would not be a candidate for intervention.  Intervention for right CCA/ICA stenosis would be high risk but it may indeed by a symptomatic stenosis  - ECHO in February 2022 did not reveal severe cardiomyopathy, vegetation, or LV thrombus.  Consider repeating to confirm.  - HgA1c of 10.1 in February 2022 may be driving stroke risk.  Goal is < 7.  Will repeat     - on asa/lipitor    -appreciate vasc notes, no further inpt workup for these vascular issues in light of overall medical issues.  To be reassessed as outpt as per vascular  no further neuro inpt workup needed

## 2022-05-16 NOTE — PROGRESS NOTE ADULT - ASSESSMENT
·  Problem:[email or web address].co hemodialysis per renal  to cont as per renal     Problem/Plan - 2:  ·  Problem: Chronic atrial fibrillation. c/w a/c  cards f/u     Problem/Plan - 3:  ·  Problem: Cystitis.   ID follow up appreciated        Problem/Plan - 4:  ·  Problem: Type 2 diabetes mellitus. c/w insulin   endo f/u      Problem/Plan - 5:  ·  Problem: CAD (coronary artery disease).   rapid a fib  meds adjusted  hr stable  c/w a/c       Persistent encephalopathy   neuro follow up appreciated    carotid duplex noted  cta noted  mra/ mri noted  tx plans as per vasc / neuro   vasc to f/u  no plans for intervention now  discussed w/  family member at bedside    rehab

## 2022-05-16 NOTE — PROGRESS NOTE ADULT - PROBLEM SELECTOR PLAN 1
Likely 2nd to b/l pl effusions, atelectasis  -Suspect fluid overload given elevated proBNP, LE edema on admission   -Keep O>I with HD as tolerated   -Pt with hx of hydroPTX. CT chest in 2/2022 with pleural thickening, atelectasis. Findings at the time suspected to be chronic. CT A/P 4/2 with small b/l pl effusions L>R, pleural thickening  -CT chest now with small b/l pl effusion R>L, bibasilar atelectasis  -SOB resolved  -Keep sats >90% with supplemental O2 as needed (currently RA)  -CXR 5/12 with small L effusion  -D/c planning per primary team

## 2022-05-16 NOTE — CHART NOTE - NSCHARTNOTEFT_GEN_A_CORE
67M PMH of CAD, NSTEMI s/p 3 stents in 2014 (stents to LAD, proximal and distal LCx), ischemic cardiomyopathy, HTN for 10 years, T2DM for 26 years c/b peripheral neuropathy, CVA, TIA, Afib on Pradaxa, chronic RLE wound, L carotid stenosis s/p endarterectomy (2014) just recently admitted  to the [facility] on 2/19/2022 with acute onset chest pain for one day found to have an NSTEMI, CAD, s/p PCI in setting of increased AF rates off bb for 3 days and resolved chest pain, his CKMB peaked and Echo noted with EF 60%,normal LV function, mild TR, mild IA. He was s/p LHC with 85% proximal LAD s/p balloon angioplasty and LULU. He presented to [facility] ED with decreased urine output over the week. Carotid duplex showed Calcified atherosclerotic disease seen throughout the carotid systems. There is new occlusion of the left internal carotid artery. There is greater than 70% stenosis involving the distal right common carotid artery as was seen previously. Mild to moderate flow-limiting stenosis is seen at the left external carotid artery. Vascular surgery consulted for carotid stenosis. Pt currently denies vision changes, dizziness, lightheadedness or any other symptoms at this point in time.     PLAN:   mra of the neck incomplete  exam  due to pt   d/w pt and fam above and from a vasc standpoint recommend to hold off on any additional w/u and optimize current conditions and  as an outpt  f/u in 4-6 weeks to re eval w MRI of the brain  for kiara cva and at that time will re eval w mra of the neck also to re eval  carotid patency  please reconsult as needed    Vascular  [id number]

## 2022-05-16 NOTE — PROGRESS NOTE ADULT - SUBJECTIVE AND OBJECTIVE BOX
Follow-up Pulm Progress Note    Pt lethargic   No new respiratory events overnight.  Denies SOB/CP.     Medications:  MEDICATIONS  (STANDING):  allopurinol 100 milliGRAM(s) Oral daily  apixaban 5 milliGRAM(s) Oral two times a day  aspirin enteric coated 81 milliGRAM(s) Oral daily  atorvastatin 40 milliGRAM(s) Oral at bedtime  chlorhexidine 4% Liquid 1 Application(s) Topical <User Schedule>  dextrose 50% Injectable 25 Gram(s) IV Push once  dextrose 50% Injectable 12.5 Gram(s) IV Push once  dextrose 50% Injectable 25 Gram(s) IV Push once  diltiazem    milliGRAM(s) Oral daily  hydrALAZINE 25 milliGRAM(s) Oral three times a day  insulin lispro (ADMELOG) corrective regimen sliding scale   SubCutaneous at bedtime  insulin lispro (ADMELOG) corrective regimen sliding scale   SubCutaneous three times a day before meals  lidocaine   4% Patch 1 Patch Transdermal daily  metoprolol succinate  milliGRAM(s) Oral daily  mupirocin 2% Ointment 1 Application(s) Both Nostrils two times a day  polyethylene glycol 3350 17 Gram(s) Oral two times a day  povidone iodine 10% Solution 1 Application(s) Topical daily  QUEtiapine 25 milliGRAM(s) Oral at bedtime  senna 2 Tablet(s) Oral at bedtime  sevelamer carbonate Powder 1600 milliGRAM(s) Oral three times a day with meals    MEDICATIONS  (PRN):  bisacodyl 5 milliGRAM(s) Oral every 12 hours PRN Constipation  dextrose Oral Gel 15 Gram(s) Oral once PRN Blood Glucose LESS THAN 70 milliGRAM(s)/deciliter  sodium chloride 0.9% lock flush 10 milliLiter(s) IV Push every 1 hour PRN Pre/post blood products, medications, blood draw, and to maintain line patency          Vital Signs Last 24 Hrs  T(C): 36.6 (16 May 2022 11:31), Max: 36.6 (15 May 2022 19:51)  T(F): 97.8 (16 May 2022 11:31), Max: 97.9 (15 May 2022 19:51)  HR: 82 (16 May 2022 11:31) (73 - 109)  BP: 133/67 (16 May 2022 11:31) (133/67 - 171/53)  BP(mean): --  RR: 18 (16 May 2022 11:31) (18 - 18)  SpO2: 97% (16 May 2022 11:31) (92% - 97%)          05-15 @ 07:01  -  05-16 @ 07:00  --------------------------------------------------------  IN: 480 mL / OUT: 0 mL / NET: 480 mL          LABS:                        9.8    9.22  )-----------( 288      ( 16 May 2022 06:30 )             34.4     05-16    135  |  95<L>  |  26<H>  ----------------------------<  198<H>  4.3   |  26  |  5.98<H>    Ca    9.2      16 May 2022 06:28    TPro  6.9  /  Alb  3.3  /  TBili  0.5  /  DBili  x   /  AST  11  /  ALT  6<L>  /  AlkPhos  156<H>  05-15          CAPILLARY BLOOD GLUCOSE      POCT Blood Glucose.: 197 mg/dL (16 May 2022 08:05)          Physical Examination:  PULM: Clear to auscultation bilaterally, no significant sputum production  CVS: S1, S2 heard    RADIOLOGY REVIEWED  CXR 5/12: small L effusion    CT chest: < from: CT Chest No Cont (04.04.22 @ 16:52) >  FINDINGS:    AIRWAYS, LUNGS, PLEURA: Tracheal secretions. Small left greater than   right pleural effusions increased compared to 2/19/2022. Right-sided   pleural thickening. Lingular and left greater than right basilar   opacification, likely atelectasis.    MEDIASTINUM: Cardiomegaly. No pericardial effusion. Thoracic aorta normal   caliber.  No large mediastinal lymph nodes.    IMAGED ABDOMEN: Nonspecific perinephric stranding.    SOFT TISSUES: Unremarkable.    BONES: Unremarkable.    IMPRESSION:.    Small left greater than right bilateral pleural effusions increased in   size compared to 2/19/2022.    Lingular and left greater than right basilar opacification, likely   atelectasis.    --- End of Report ---    < end of copied text >

## 2022-05-16 NOTE — PROGRESS NOTE ADULT - ASSESSMENT
67 year old gentleman with PMH of CAD, NSTEMI s/p 3 stents in 2014 (stents to LAD, proximal and distal LCx), ischemic cardiomyopathy, HTN for 10 years, T2DM for 26 years c/b peripheral neuropathy, CVA, TIA, Afib on Pradaxa, chronic RLE wound, L carotid stenosis s/p endarterectomy (2014) just recently admitted  to the [facility] on 2/19/2022 with acute onset chest pain for one day found to have an NSTEMI, CAD, s/p PCI in setting of increased AF rates off bb for 3 days and resolved chest pain, his CKMB peaked and Echo noted with EF 60%,normal LV function, mild TR, mild VA. He was s/p [facility] with 85% proximal LAD s/p balloon angioplasty and LULU. He presented to [facility] ED with decreased urine output over the week. [facility]. Stevens went to [facility] for hematuria and was started  on Nitrofurantoin. Pt is still taking Nitrofurantoin w/ 5 days left. He came to the ED due to worsening symptoms. Pt reports having a similar incident 4-5 years ago that resolved spontaneously. He reports increased leg edema Dyspnea worse on exertion. his baseline Serum creatinine is in the 2.0 to 2.3 mg/dl range. ANT with serum creatinine of 8.29 with bun 144 and k 5.5      1- ANT on ckd III ---> ESRD likely   2- chf chronic   3- hyperphosphatemia /shpt   4- anemia   5- hyperlipidemia   6- HTN /a fib  7- TIA hx   8- hx of carotid stenosis      HD in am   renvela 2 tab with meals   hydralazine 25 mg tid  anemia - epogen 32467 Units TIW with HD.     Recheck iron profile and ferritin in am  PT  seroquel 25 mg daily  vascular follow up outpatient in 4-6 weeks

## 2022-05-17 NOTE — PROGRESS NOTE ADULT - NS ATTEND AMEND GEN_ALL_CORE FT
seen on hd   heart RRR  lungs decrease bs no rales  ext no edema    ESRD   HD as planned  see hd flow sheet

## 2022-05-17 NOTE — PROGRESS NOTE ADULT - SUBJECTIVE AND OBJECTIVE BOX
DATE OF SERVICE: 05-17-22 @ 10:24  CHIEF COMPLAINT:Patient is a 67y old  Male who presents with a chief complaint of Decreased urine output for the past week, leg oedema (16 May 2022 16:55)    	        PAST MEDICAL & SURGICAL HISTORY:  HTN (hypertension), benign      HLD (hyperlipidemia)      DM type 2 (diabetes mellitus, type 2)      TIA (transient ischemic attack)      Atrial fibrillation      MI (myocardial infarction)  circa 2014 and 2022.      CAD (coronary artery disease)      Neuropathy      Stage 3 chronic kidney disease      2019 novel coronavirus disease (COVID-19)  12/2021.  Received the COVID-19 vaccine x 2 as of April 2022.      Status post angioplasty with stent  LULU x 3 2/7/2014      S/P carotid endarterectomy  left          awake  alert    RESPIRATORY: No cough, wheezing, chills or hemoptysis; No Shortness of Breath  CARDIOVASCULAR: No chest pain, palpitations,   GASTROINTESTINAL: No abdominal or epigastric pain. No nausea, vomiting, or hematemesis;  GENITOURINARY: No dysuria, frequency, hematuria,  NEUROLOGICAL: No headaches,   Medications:  MEDICATIONS  (STANDING):  allopurinol 100 milliGRAM(s) Oral daily  apixaban 5 milliGRAM(s) Oral two times a day  aspirin enteric coated 81 milliGRAM(s) Oral daily  atorvastatin 40 milliGRAM(s) Oral at bedtime  chlorhexidine 4% Liquid 1 Application(s) Topical <User Schedule>  dextrose 50% Injectable 25 Gram(s) IV Push once  dextrose 50% Injectable 25 Gram(s) IV Push once  dextrose 50% Injectable 12.5 Gram(s) IV Push once  diltiazem    milliGRAM(s) Oral daily  epoetin naz-epbx (RETACRIT) Injectable 75026 Unit(s) IV Push once  hydrALAZINE 25 milliGRAM(s) Oral three times a day  insulin lispro (ADMELOG) corrective regimen sliding scale   SubCutaneous at bedtime  insulin lispro (ADMELOG) corrective regimen sliding scale   SubCutaneous three times a day before meals  lidocaine   4% Patch 1 Patch Transdermal daily  metoprolol succinate  milliGRAM(s) Oral daily  mupirocin 2% Ointment 1 Application(s) Both Nostrils two times a day  polyethylene glycol 3350 17 Gram(s) Oral two times a day  povidone iodine 10% Solution 1 Application(s) Topical daily  QUEtiapine 25 milliGRAM(s) Oral at bedtime  senna 2 Tablet(s) Oral at bedtime  sevelamer carbonate Powder 1600 milliGRAM(s) Oral three times a day with meals    MEDICATIONS  (PRN):  bisacodyl 5 milliGRAM(s) Oral every 12 hours PRN Constipation  dextrose Oral Gel 15 Gram(s) Oral once PRN Blood Glucose LESS THAN 70 milliGRAM(s)/deciliter  sodium chloride 0.9% lock flush 10 milliLiter(s) IV Push every 1 hour PRN Pre/post blood products, medications, blood draw, and to maintain line patency    	    PHYSICAL EXAM:  T(C): 36.7 (05-17-22 @ 05:48), Max: 36.7 (05-17-22 @ 05:48)  HR: 103 (05-17-22 @ 05:48) (81 - 103)  BP: 157/71 (05-17-22 @ 05:48) (133/67 - 162/71)  RR: 18 (05-17-22 @ 05:48) (18 - 18)  SpO2: 98% (05-17-22 @ 05:48) (97% - 99%)  Wt(kg): --  I&O's Summary    16 May 2022 07:01  -  17 May 2022 07:00  --------------------------------------------------------  IN: 780 mL / OUT: 0 mL / NET: 780 mL    17 May 2022 07:01  -  17 May 2022 10:24  --------------------------------------------------------  IN: 120 mL / OUT: 0 mL / NET: 120 mL        Appearance: Normal	  HEENT:   Normal oral mucosa, PERRL, EOMI	    Cardiovascular: Normal S1 S2, No JVD,   Respiratory: Lungs clear to auscultation	  Psychiatry: A & O  Gastrointestinal:  Soft, Non-tender, + BS	  	  Neurologic: Non-focal  Extremities: dec rom   pvd    TELEMETRY: 	    ECG:  	  RADIOLOGY:  OTHER: 	  	  LABS:	 	    CARDIAC MARKERS:                                9.8    9.22  )-----------( 288      ( 16 May 2022 06:30 )             34.4     05-17    133<L>  |  95<L>  |  33<H>  ----------------------------<  214<H>  4.6   |  25  |  7.34<H>    Ca    9.2      17 May 2022 05:54      proBNP:   Lipid Profile:   HgA1c:   TSH:

## 2022-05-17 NOTE — PROGRESS NOTE ADULT - SUBJECTIVE AND OBJECTIVE BOX
CARDIOLOGY FOLLOW UP - Dr. Mazariegos  DATE OF SERVICE: 5/17/22     CC no acute events       REVIEW OF SYSTEMS:  CONSTITUTIONAL: No fever, weight loss, or fatigue  RESPIRATORY: No cough, wheezing, chills or hemoptysis; No Shortness of Breath  CARDIOVASCULAR: No chest pain, palpitations, passing out, dizziness, or leg swelling  GASTROINTESTINAL: No abdominal or epigastric pain. No nausea, vomiting, or hematemesis; No diarrhea or constipation. No melena or hematochezia.      PHYSICAL EXAM:  T(C): 36.6 (05-17-22 @ 11:10), Max: 36.7 (05-17-22 @ 05:48)  HR: 78 (05-17-22 @ 11:10) (78 - 103)  BP: 146/78 (05-17-22 @ 11:10) (146/78 - 162/71)  RR: 18 (05-17-22 @ 11:10) (18 - 18)  SpO2: 97% (05-17-22 @ 11:10) (97% - 99%)  Wt(kg): --  I&O's Summary    16 May 2022 07:01  -  17 May 2022 07:00  --------------------------------------------------------  IN: 780 mL / OUT: 0 mL / NET: 780 mL    17 May 2022 07:01  -  17 May 2022 12:11  --------------------------------------------------------  IN: 120 mL / OUT: 0 mL / NET: 120 mL        Appearance: Normal	  Cardiovascular: Normal S1 S2,RRR, No JVD, No murmurs  Respiratory: Lungs clear to auscultation	  Gastrointestinal:  Soft, Non-tender, + BS	  Extremities: Normal range of motion, No clubbing, cyanosis or edema      Home Medications:  allopurinol 100 mg oral tablet: 1 tab(s) orally once a day (03 Apr 2022 06:34)  aspirin 81 mg oral delayed release tablet: 1 tab(s) orally once a day (03 Apr 2022 06:34)  bumetanide 2 mg oral tablet: 1 tab(s) orally once a day (03 Apr 2022 06:34)  insulin glargine 100 units/mL subcutaneous solution: 25 unit(s) subcutaneous once a day (at bedtime) (03 Apr 2022 06:34)  metOLazone 5 mg oral tablet: 1 tab(s) orally 3 times a week (03 Apr 2022 06:34)  metoprolol succinate 50 mg oral tablet, extended release: 2 tab(s) orally once a day (03 Apr 2022 06:34)  NovoLOG 100 units/mL subcutaneous solution: subcutaneous 4 times a day (before meals and at bedtime) (03 Apr 2022 06:34)  NovoLOG FlexPen 100 units/mL injectable solution: 10 unit(s) subcutaneous 3 times a day (03 Apr 2022 06:34)  oxycodone-acetaminophen 5 mg-325 mg oral tablet: 1 tab(s) orally 2 times a day (03 Apr 2022 06:34)  Pradaxa 150 mg oral capsule: 1 cap(s) orally 2 times a day (03 Apr 2022 06:34)  pregabalin 100 mg oral capsule: 1 cap(s) orally 3 times a day (03 Apr 2022 06:34)      MEDICATIONS  (STANDING):  allopurinol 100 milliGRAM(s) Oral daily  apixaban 5 milliGRAM(s) Oral two times a day  aspirin enteric coated 81 milliGRAM(s) Oral daily  atorvastatin 40 milliGRAM(s) Oral at bedtime  chlorhexidine 4% Liquid 1 Application(s) Topical <User Schedule>  dextrose 50% Injectable 25 Gram(s) IV Push once  dextrose 50% Injectable 25 Gram(s) IV Push once  dextrose 50% Injectable 12.5 Gram(s) IV Push once  diltiazem    milliGRAM(s) Oral daily  epoetin naz-epbx (RETACRIT) Injectable 91991 Unit(s) IV Push once  hydrALAZINE 25 milliGRAM(s) Oral three times a day  insulin lispro (ADMELOG) corrective regimen sliding scale   SubCutaneous three times a day before meals  insulin lispro (ADMELOG) corrective regimen sliding scale   SubCutaneous at bedtime  lidocaine   4% Patch 1 Patch Transdermal daily  metoprolol succinate  milliGRAM(s) Oral daily  mupirocin 2% Ointment 1 Application(s) Both Nostrils two times a day  polyethylene glycol 3350 17 Gram(s) Oral two times a day  povidone iodine 10% Solution 1 Application(s) Topical daily  QUEtiapine 25 milliGRAM(s) Oral at bedtime  senna 2 Tablet(s) Oral at bedtime  sevelamer carbonate Powder 1600 milliGRAM(s) Oral three times a day with meals      TELEMETRY: 	    ECG:  	  RADIOLOGY:   DIAGNOSTIC TESTING:  [ ] Echocardiogram:  < from: TTE with Doppler (w/Cont) (05.13.22 @ 12:17) >  EF (Visual Estimate): 55 %  Doppler Peak Velocity (m/sec): AoV=1.4  ------------------------------------------------------------------------  Observations:  Mitral Valve: Mitral annular calcification, otherwise  normal mitral valve.  Aortic Valve/Aorta: Calcified trileaflet aortic valve with  normal opening. Peak transaortic valve gradient equals 8 mm  Hg, estimated aortic valve area equals 2.6 sqcm. Peak left  ventricular outflow tract gradient equals 3 mm Hg.  Aortic Root: 3.9 cm.  LVOT diameter: 2.4 cm.  Left Atrium: Normal left atrium.  LA volume index = 26  cc/m2.  Left Ventricle: Endocardial visualization enhanced with  intravenous injection of Ultrasonic Enhancing Agent  (Lumason). Grossly borderline normal left ventricular  systolic function; no obvious segmental wall motion  abnormality. Normal left ventricular internal dimensions  and wall thicknesses.  Right Heart: Normal right atrium. The right ventricle is  not well visualized.  Normal tricuspid valve. Normal  pulmonic valve.  Pericardium/Pleura: Normal pericardium with no pericardial  effusion.  Hemodynamic: Estimated right atrial pressure is 8 mm Hg.  Estimated right ventricular systolic pressure equals 37 mm  Hg, assuming right atrial pressure equals 8 mm Hg,  consistent with borderline pulmonary hypertension.  ------------------------------------------------------------------------  Conclusions:  1. Normal left ventricular internal dimensions and wall  thicknesses.  2. Endocardial visualization enhanced with intravenous  injection of Ultrasonic Enhancing Agent (Lumason). Grossly  borderline normal left ventricular systolic function; no  obvious segmental wall motion abnormality.  3. The right ventricle is not well visualized.  *** Compared with echocardiogram of 2/22/2022, no  significant changes noted.  ------------------------------------------------------------------------  Confirmed on  5/13/2022 - 14:25:02 by Kishan Pickett M.D.  ------------------------------------------------------------------------    < end of copied text >    [ ]  Catheterization:  [ ] Stress Test:    OTHER: 	    LABS:	 	                            9.8    9.22  )-----------( 288      ( 16 May 2022 06:30 )             34.4     05-17    133<L>  |  95<L>  |  33<H>  ----------------------------<  214<H>  4.6   |  25  |  7.34<H>    Ca    9.2      17 May 2022 05:54

## 2022-05-17 NOTE — PROVIDER CONTACT NOTE (OTHER) - ASSESSMENT
Pt AO2-3, VSS. Pt denies chest pain, sob, or lightheadedness. Pt refuses medication even after education

## 2022-05-17 NOTE — PROGRESS NOTE ADULT - SUBJECTIVE AND OBJECTIVE BOX
[facility]   [phone]  RENAL FOLLOW UP NOTE  --------------------------------------------------------------------------------  Chief Complaint:    24 hour events/subjective:    patient seen and examined on HD  lethargic  has no complaints    PAST HISTORY  --------------------------------------------------------------------------------  No significant changes to PMH, PSH, FHx, SHx, unless otherwise noted    ALLERGIES & MEDICATIONS  --------------------------------------------------------------------------------  Allergies    nitrofurantoin (Nephrotoxicity)    Intolerances      Standing Inpatient Medications  allopurinol 100 milliGRAM(s) Oral daily  apixaban 5 milliGRAM(s) Oral two times a day  aspirin enteric coated 81 milliGRAM(s) Oral daily  atorvastatin 40 milliGRAM(s) Oral at bedtime  chlorhexidine 4% Liquid 1 Application(s) Topical <User Schedule>  dextrose 50% Injectable 25 Gram(s) IV Push once  dextrose 50% Injectable 25 Gram(s) IV Push once  dextrose 50% Injectable 12.5 Gram(s) IV Push once  diltiazem    milliGRAM(s) Oral daily  hydrALAZINE 25 milliGRAM(s) Oral three times a day  insulin lispro (ADMELOG) corrective regimen sliding scale   SubCutaneous three times a day before meals  insulin lispro (ADMELOG) corrective regimen sliding scale   SubCutaneous at bedtime  lidocaine   4% Patch 1 Patch Transdermal daily  metoprolol succinate  milliGRAM(s) Oral daily  mupirocin 2% Ointment 1 Application(s) Both Nostrils two times a day  polyethylene glycol 3350 17 Gram(s) Oral two times a day  povidone iodine 10% Solution 1 Application(s) Topical daily  QUEtiapine 25 milliGRAM(s) Oral at bedtime  senna 2 Tablet(s) Oral at bedtime  sevelamer carbonate Powder 1600 milliGRAM(s) Oral three times a day with meals    PRN Inpatient Medications  bisacodyl 5 milliGRAM(s) Oral every 12 hours PRN  dextrose Oral Gel 15 Gram(s) Oral once PRN  sodium chloride 0.9% lock flush 10 milliLiter(s) IV Push every 1 hour PRN      REVIEW OF SYSTEMS  --------------------------------------------------------------------------------    Gen: denies fevers/chills,  CVS: denies chest pain/palpitations  Resp: denies SOB/Cough  GI: Denies N/V/Abd pain  : Denies dysuria    VITALS/PHYSICAL EXAM  --------------------------------------------------------------------------------  T(C): 36.4 (05-17-22 @ 13:20), Max: 36.7 (05-17-22 @ 05:48)  HR: 81 (05-17-22 @ 13:20) (78 - 103)  BP: 111/63 (05-17-22 @ 13:20) (111/63 - 157/71)  RR: 17 (05-17-22 @ 13:20) (17 - 18)  SpO2: 96% (05-17-22 @ 13:20) (96% - 98%)  Wt(kg): --        05-16-22 @ 07:01  -  05-17-22 @ 07:00  --------------------------------------------------------  IN: 780 mL / OUT: 0 mL / NET: 780 mL    05-17-22 @ 07:01  -  05-17-22 @ 14:33  --------------------------------------------------------  IN: 360 mL / OUT: 0 mL / NET: 360 mL      Physical Exam:  	              Gen: appears comfortable  	Pulm: Decreased breath sounds b/l bases.  	CV: No JVD. IRR,   	Abd: +BS, soft, nontender, softly distended, obese               Extremity no edema              Extremity LE: + hyperpigmented bilateral LE with no edema              Vascular: R IJ HD catheter, L arm AVF     LABS/STUDIES  --------------------------------------------------------------------------------              9.8    9.22  >-----------<  288      [05-16-22 @ 06:30]              34.4     133  |  95  |  33  ----------------------------<  214      [05-17-22 @ 05:54]  4.6   |  25  |  7.34        Ca     9.2     [05-17-22 @ 05:54]            Creatinine Trend:  SCr 7.34 [05-17 @ 05:54]  SCr 5.98 [05-16 @ 06:28]  SCr 4.22 [05-15 @ 07:37]  SCr 7.34 [05-12 @ 17:15]  SCr 8.68 [05-10 @ 15:00]                  Iron 21, TIBC 245, %sat 9      [05-17-22 @ 05:54]  Ferritin 120      [05-17-22 @ 05:54]  PTH -- (Ca 8.3)      [04-15-22 @ 12:18]   150  PTH -- (Ca --)      [04-03-22 @ 23:06]   97  HbA1c 11.7      [11-07-19 @ 09:14]  TSH 1.71      [05-12-22 @ 07:11]       [facility]   [phone]  RENAL FOLLOW UP NOTE  --------------------------------------------------------------------------------  Chief Complaint:    24 hour events/subjective:    patient seen and examined on HD  lethargic  has no complaints    PAST HISTORY  --------------------------------------------------------------------------------  No significant changes to PMH, PSH, FHx, SHx, unless otherwise noted    ALLERGIES & MEDICATIONS  --------------------------------------------------------------------------------  Allergies    nitrofurantoin (Nephrotoxicity)    Intolerances      Standing Inpatient Medications  allopurinol 100 milliGRAM(s) Oral daily  apixaban 5 milliGRAM(s) Oral two times a day  aspirin enteric coated 81 milliGRAM(s) Oral daily  atorvastatin 40 milliGRAM(s) Oral at bedtime  chlorhexidine 4% Liquid 1 Application(s) Topical <User Schedule>  dextrose 50% Injectable 25 Gram(s) IV Push once  dextrose 50% Injectable 25 Gram(s) IV Push once  dextrose 50% Injectable 12.5 Gram(s) IV Push once  diltiazem    milliGRAM(s) Oral daily  hydrALAZINE 25 milliGRAM(s) Oral three times a day  insulin lispro (ADMELOG) corrective regimen sliding scale   SubCutaneous three times a day before meals  insulin lispro (ADMELOG) corrective regimen sliding scale   SubCutaneous at bedtime  lidocaine   4% Patch 1 Patch Transdermal daily  metoprolol succinate  milliGRAM(s) Oral daily  mupirocin 2% Ointment 1 Application(s) Both Nostrils two times a day  polyethylene glycol 3350 17 Gram(s) Oral two times a day  povidone iodine 10% Solution 1 Application(s) Topical daily  QUEtiapine 25 milliGRAM(s) Oral at bedtime  senna 2 Tablet(s) Oral at bedtime  sevelamer carbonate Powder 1600 milliGRAM(s) Oral three times a day with meals    PRN Inpatient Medications  bisacodyl 5 milliGRAM(s) Oral every 12 hours PRN  dextrose Oral Gel 15 Gram(s) Oral once PRN  sodium chloride 0.9% lock flush 10 milliLiter(s) IV Push every 1 hour PRN      REVIEW OF SYSTEMS  --------------------------------------------------------------------------------    Gen: denies fevers/chills,  CVS: denies chest pain/palpitations  Resp: denies SOB/Cough  GI: Denies N/V/Abd pain  : Denies dysuria    VITALS/PHYSICAL EXAM  --------------------------------------------------------------------------------  T(C): 36.4 (05-17-22 @ 13:20), Max: 36.7 (05-17-22 @ 05:48)  HR: 81 (05-17-22 @ 13:20) (78 - 103)  BP: 111/63 (05-17-22 @ 13:20) (111/63 - 157/71)  RR: 17 (05-17-22 @ 13:20) (17 - 18)  SpO2: 96% (05-17-22 @ 13:20) (96% - 98%)  Wt(kg): --        05-16-22 @ 07:01  -  05-17-22 @ 07:00  --------------------------------------------------------  IN: 780 mL / OUT: 0 mL / NET: 780 mL    05-17-22 @ 07:01  -  05-17-22 @ 14:33  --------------------------------------------------------  IN: 360 mL / OUT: 0 mL / NET: 360 mL      Physical Exam:  	              Gen: appears comfortable  	Pulm: Decreased breath sounds b/l bases.  	CV: No JVD. IRR,   	Abd: +BS, soft, nontender, softly distended, obese               Extremity no edema              Extremity LE: + hyperpigmented bilateral LE with no edema              Vascular: R IJ HD catheter, L arm AVF     LABS/STUDIES  --------------------------------------------------------------------------------              9.8    9.22  >-----------<  288      [05-16-22 @ 06:30]              34.4     133  |  95  |  33  ----------------------------<  214      [05-17-22 @ 05:54]  4.6   |  25  |  7.34        Ca     9.2     [05-17-22 @ 05:54]            Creatinine Trend:  SCr 7.34 [05-17 @ 05:54]  SCr 5.98 [05-16 @ 06:28]  SCr 4.22 [05-15 @ 07:37]  SCr 7.34 [05-12 @ 17:15]  SCr 8.68 [05-10 @ 15:00]                  Iron 21, TIBC 245, %sat 9      [05-17-22 @ 05:54]  Ferritin 120      [05-17-22 @ 05:54]  PTH -- (Ca 8.3)      [04-15-22 @ 12:18]   150  PTH -- (Ca --)      [04-03-22 @ 23:06]   97  HbA1c 11.7      [11-07-19 @ 09:14]  TSH 1.71      [05-12-22 @ 07:11]

## 2022-05-17 NOTE — PROGRESS NOTE ADULT - SUBJECTIVE AND OBJECTIVE BOX
Admitting Diagnosis:  Chronic kidney disease [N18.9]  CHRONIC KIDNEY DISEASE, UNSPECIFIED    Background:  This is a 67 year old gentleman pmhx CAD s/p MI/PCI/CABG, hx TIA, afib on rivaroxaban, HTN, DM2, PVD, gout, L CEA 2014, and CKD who presented to [facility] 4/11/22 with ANT, tremors, confusion and lethargy.  CT head no acute changes.  S/p initiation of hemodialysis.  Mental status has improved dramatically.  Pt denies any headaches, no slurred speech, no hemiparesis.  He has chronic gout with bilateral finger swelling, pain.  He also has chronic neuropathy.  Both legs are weak.  MRI showing scattered infarcts likely in setting of atrial fibrillation and both intracranial and extracranial atherosclerotic disease.    Interval Hx:  pending rehab    Past Medical History:  HTN (hypertension), benign [401.1]  HLD (hyperlipidemia) [272.4]  DM type 2 (diabetes mellitus, type 2) [250.00]  TIA (transient ischemic attack) [435.9]  Atrial fibrillation [427.31]  MI (myocardial infarction) [410.90]  circa 2014 and 2022.  CAD (coronary artery disease) [414.00]  Neuropathy [G62.9]  Stage 3 chronic kidney disease [N18.30]  2019 novel coronavirus disease (COVID-19) [U07.1]  12/2021.  Received the COVID-19 vaccine x 2 as of April 2022.    Past Surgical History:  Status post angioplasty with stent [V45.82]  LULU x 3 2/7/2014  S/P carotid endarterectomy [Z98.89]  left  S/P CABG (coronary artery bypass graft) [Z95.1]        Social History:  No toxic habits    Family History:  FAMILY HISTORY:  Family history of heart disease          ROS: as per HPI.      Medications:  allopurinol 100 milliGRAM(s) Oral daily  apixaban 5 milliGRAM(s) Oral two times a day  aspirin enteric coated 81 milliGRAM(s) Oral daily  atorvastatin 40 milliGRAM(s) Oral at bedtime  bisacodyl 5 milliGRAM(s) Oral every 12 hours PRN  chlorhexidine 4% Liquid 1 Application(s) Topical <User Schedule>  dextrose 50% Injectable 25 Gram(s) IV Push once  dextrose 50% Injectable 25 Gram(s) IV Push once  dextrose 50% Injectable 12.5 Gram(s) IV Push once  dextrose Oral Gel 15 Gram(s) Oral once PRN  diltiazem    milliGRAM(s) Oral daily  epoetin naz-epbx (RETACRIT) Injectable 28722 Unit(s) IV Push once  hydrALAZINE 25 milliGRAM(s) Oral three times a day  insulin lispro (ADMELOG) corrective regimen sliding scale   SubCutaneous three times a day before meals  insulin lispro (ADMELOG) corrective regimen sliding scale   SubCutaneous at bedtime  lidocaine   4% Patch 1 Patch Transdermal daily  metoprolol succinate  milliGRAM(s) Oral daily  mupirocin 2% Ointment 1 Application(s) Both Nostrils two times a day  polyethylene glycol 3350 17 Gram(s) Oral two times a day  povidone iodine 10% Solution 1 Application(s) Topical daily  QUEtiapine 25 milliGRAM(s) Oral at bedtime  senna 2 Tablet(s) Oral at bedtime  sevelamer carbonate Powder 1600 milliGRAM(s) Oral three times a day with meals  sodium chloride 0.9% lock flush 10 milliLiter(s) IV Push every 1 hour PRN      Labs:  CBC Full  -  ( 16 May 2022 06:30 )  WBC Count : 9.22 K/uL  RBC Count : 4.24 M/uL  Hemoglobin : 9.8 g/dL  Hematocrit : 34.4 %  Platelet Count - Automated : 288 K/uL  Mean Cell Volume : 81.1 fl  Mean Cell Hemoglobin : 23.1 pg  Mean Cell Hemoglobin Concentration : 28.5 gm/dL  Auto Neutrophil # : x  Auto Lymphocyte # : x  Auto Monocyte # : x  Auto Eosinophil # : x  Auto Basophil # : x  Auto Neutrophil % : x  Auto Lymphocyte % : x  Auto Monocyte % : x  Auto Eosinophil % : x  Auto Basophil % : x    05-17    133<L>  |  95<L>  |  33<H>  ----------------------------<  214<H>  4.6   |  25  |  7.34<H>    Ca    9.2      17 May 2022 05:54      CAPILLARY BLOOD GLUCOSE      POCT Blood Glucose.: 202 mg/dL (17 May 2022 07:48)  POCT Blood Glucose.: 225 mg/dL (16 May 2022 20:48)  POCT Blood Glucose.: 218 mg/dL (16 May 2022 16:40)  POCT Blood Glucose.: 280 mg/dL (16 May 2022 12:20)              Vitals:  Vital Signs Last 24 Hrs  T(C): 36.6 (17 May 2022 11:10), Max: 36.7 (17 May 2022 05:48)  T(F): 97.9 (17 May 2022 11:10), Max: 98 (17 May 2022 05:48)  HR: 78 (17 May 2022 11:10) (78 - 103)  BP: 146/78 (17 May 2022 11:10) (146/78 - 162/71)  BP(mean): --  RR: 18 (17 May 2022 11:10) (18 - 18)  SpO2: 97% (17 May 2022 11:10) (97% - 99%)      NEUROLOGICAL EXAM:    Mental status: awake alert, and in no apparent distress. Oriented to person, knows he is in a hospital but not which one, year 2022 something, when asked president says who cares and would not try to guess   Language intact.  Attention impaired.      Cranial Nerves: Pupils were equal, round, reactive to light. Extraocular movements were intact. B BTT Facial sensation was intact to light touch. There was no facial asymmetry. The palate was upgoing symmetrically and tongue was midline. Hearing acuity was intact to finger rub AU. Shoulder shrug was full bilaterally    Motor exam: Bulk and tone were normal. moves all ext.  Fine finger movements were symmetric. There was bilateral symmetric pronator drift    Reflexes: DTRs depressed, flexor plantars.     Sensation: Intact to noxious, seems intact to light touch, due to decreased attention did not assess other modalities     Coordination: no gross dysmetria    Gait: deferred    NIHSS: 13    Imaging:    ACC: [id number] EXAM:  CT BRAIN                        PROCEDURE DATE:  04/09/2022      INTERPRETATION:  CLINICAL INFORMATION:  Altered mental status, on   anticoagulation .    TECHNIQUE: Multiple contiguous axial images were acquired from the   skullbase to the vertex without the administration of intravenous   contrast.  Coronal and sagittal reformations were made.    COMPARISON: CT head 10/21/2021, brain MRI 02/23/2014.    FINDINGS:    Scattered lacunar infarcts in the bilateral cerebralhemispheres are   grossly stable compared to the 10/21/2021 head CT. No new additional   infarcts are noted over the time interval.    No acute intracranial hemorrhage, mass effect, or midline shift. The   brain demonstrates periventricular hypoattenuation, nonspecific in   etiology but likely representing chronic microvascular ischemic changes.    No abnormal extra-axial fluid collections are present.    The ventricles and sulci demonstrate age appropriate involutional   changes.  The basal cisterns are patent.    The orbits are unremarkable. The paranasal sinuses are clear. The mastoid   air cells are clear. The calvarium is intact.    IMPRESSION:    No acute intracranial abnormality is noted. If the patient has new and   persistent symptoms, consider short interval follow-up head CT or brain   MRI.    --- End of Report ---    GATITO VILLARREAL MD; Resident Radiologist  This document has been electronically signed.  JESSE CORONA MD; Attending Radiologist  This document has been electronically signed. Apr 9 2022  2:00PM        ACC: [id number] EXAM:  MR BRAIN                          PROCEDURE DATE:  05/11/2022          INTERPRETATION:  MR brain  without gadolinium    CLINICAL INFORMATION: Stroke.    TECHNIQUE: Limited Sagittal T1-weighted images, axial FLAIR images, and   axial diffusion weighted images of the brain were obtained.  Patient was   unable to hold still for remaining sequences.    FINDINGS:   MRI dated 02/23/2014 available for review.    The brain demonstrates small acute infarctions in the BILATERAL posterior   centrum semiovale and LEFT corona radiata and LEFT posterior   periventricular white matter with restricted diffusion. No intracranial   hemorrhage is recognized. No mass effect is found in the brain.    The ventricles, sulci and basal cisterns show mild global atrophy most   prominent within the BILATERAL cerebellum.    The vertebral and internal carotid arteries demonstrate expected flow   voids indicating their patency.    The orbits are unremarkable.  The paranasal sinuses are clear.  The nasal   cavity appears intact.  The nasopharynx is symmetric.  The central skull   base and temporal bones are intact.  The calvarium appears unremarkable.    IMPRESSION: Small acute infarctions in the BILATERAL posterior centrum   semiovale and LEFT corona radiata and LEFT posterior periventricular   white matter with restricted diffusion.   mild global atrophy most   prominent within the BILATERAL cerebellum.    --- End of Report ---            JESÚS PADGETT MD; Attending Radiologist  This document has been electronically signed. May 11 2022  5:47PM        Patient name: DYLAN DEL CASTILLO  YOB: 1955   Age: 67 (M)   MR#: [id number]  Study Date: 2/22/2022  Location: [facility]: [facility]  Study quality: Technically difficult  Referring Physician: Mendoza Corral MD  Blood Pressure: 152/82 mmHg  Height: 180 cm  Weight: 136 kg  BSA: 2.5 m2  ------------------------------------------------------------------------  PROCEDURE: Transthoracic echocardiogram with 2-D, M-Mode  and complete spectral and color flow Doppler. Verbal  consent was obtained for injection of  Ultrasonic Enhancing  Agent following a discussion of risks and benefits.  Following intravenous injection of Ultrasonic Enhancing  Agent , harmonic imaging was performed.  INDICATION: Chest pain, unspecified (R07.9)  ------------------------------------------------------------------------  Dimensions:    Normal Values:  LA:     4.4    2.0 - 4.0 cm  Ao:     4.0    2.0 - 3.8 cm  SEPTUM: 1.1    0.6 - 1.2 cm  PWT:    0.9    0.6 - 1.1 cm  LVIDd:  5.0    3.0 - 5.6 cm  LVIDs:  3.5    1.8 - 4.0 cm  Derived variables:  LVMI: 73 g/m2  RWT: 0.36  EF (Visual Estimate): 60 %  Doppler Peak Velocity (m/sec): AoV=1.5 TV=2.2  ------------------------------------------------------------------------  Observations:  Mitral Valve: Normal mitral valve.  Aortic Valve/Aorta: Calcified aortic valve with normal  opening. Minimal aortic regurgitation.  Normal aortic root size.  Left Atrium: Mildly dilated left atrium.  Left Ventricle: Endocardial visualization enhanced with  intravenous injection of Ultrasonic Enhancing Agent  (Definity).  Normal left ventricular internal dimensions and wall  thickness.  Normal left ventricular systolic function. No segmental  wall motion abnormalities.  Right Heart: Normal right atrium. Normal right ventricular  size and function.  Normal tricuspid valve. Mild tricuspid regurgitation.  Normal pulmonic valve. Mild pulmonic regurgitation.  Pericardium/Pleura: Normal pericardium with no pericardial  effusion.  Hemodynamic: No evidence of pulmonary hypertension.  ------------------------------------------------------------------------  Conclusions:  Endocardial visualization enhanced with intravenous  injection of Ultrasonic Enhancing Agent (Definity).  Normal left ventricular systolic function. No segmental  wall motion abnormalities.  ------------------------------------------------------------------------  Confirmed on  2/22/2022 - 16:17:39 by Ishan Ackerman MD, FASE  ------------------------------------------------------------------------    < from: CT Angio Head w/ IV Cont (05.12.22 @ 13:34) >    ACC: [id number] EXAM:  CT ANGIO NECK (W)AW IC                        ACC: [id number] EXAM:  CT ANGIO BRAIN (W)AW IC                          PROCEDURE DATE:  05/12/2022          INTERPRETATION:  CT angiography of the brain and neck    CLINICAL INDICATION: Recent infarcts. Carotid stenosis.    TECHNIQUE: Direct axial CT scanning of the brain and neck was obtained   from the vertex to the level of the clavicular heads after the dynamic   intravenous injection of 44 cc of Omnipaque 300. 0 cc were discarded.   Sagittal and coronal maximum intensity projection reformats were   provided.  Three-dimensional reconstructions were performed by the   radiologist using the [employer] workstation.    Additionally, noncontrast axial CT scanning of the brain was obtained   from the skull base to the vertex. Sagittal and coronal reformats were   provided.    COMPARISON: MRA neck 10/24/2021    FINDINGS:    CT brain:    Previously seen acute infarcts in the bilateral cerebral hemispheres are   redemonstrated. No CT evidence for hemorrhagic transformation.    No hydrocephalus, midline shift, or effacement of basal cisterns.    No acute intracranial hemorrhage or vasogenic edema.    Mild to moderate white matter microvascular ischemic disease.    CTA brain:    Limited by relatively decreased opacification of the arteries.    Multifocal narrowing of the right skull base ICA due to calcified plaque,   the degree of which is not well evaluated.    Normal flow-related enhancement of the supraclinoid right ICA.    Lack of flow related enhancement of the petrous, precavernous, and   cavernous left ICA. Reconstitution of the vessel at its supraclinoid   segment.    Otherwise no flow-limiting stenosis.    Normal flow-related enhancement of the bilateral anterior, middle, and   posterior cerebral arteries.    Normal flow-related enhancement of the intradural vertebral arteries and   basilar artery.    No vascular aneurysm or AVM.    CTA neck:    Stenoses described in this report are on the basis of NASCET criteria.    Study is significantly limited by relatively decreased opacification of   the arteries.    Short segment stenosis of the mid left common carotid artery due to the   presence of partially calcified plaque is poorly evaluated. Flow-related   enhancement of the more proximal and distal left common carotid artery is   noted.    Long segment stenosis of the mid and distal right common carotid artery   due to the presence of partially calcified plaque is poorly evaluated.    Rapidly progressive stenosis of the left internal carotid artery   beginning at its origin. No flow related enhancement of the vessel beyond   its origin.    Normal flow-related enhancement of the bilateral vertebral arteries.    No gross evidence for acute arterial dissection.    IMPRESSION:    CTA brain:  Limited by relatively decreased opacification of the arteries.    Multifocal narrowing of the right skull base ICA due to calcified plaque,   the degree of which is not well evaluated.    Lack of flow related enhancement of the petrous, precavernous, and   cavernous left ICA. Reconstitution of the vessel at its supraclinoid   segment.    Otherwise no flow-limiting stenosis.    No vascular aneurysm or AVM.    CTA neck:  Limited by relatively decreased opacification of the arteries.    Short segment stenosis of the mid left common carotid artery due to the   presence of partially calcified plaque is poorly evaluated. Flow-related   enhancement of the more proximal and distal left common carotid artery is   noted.    Long segment stenosis of the mid and distal right common carotid artery   due to the presence of partially calcified plaque is poorly evaluated.    Rapidly progressive stenosis of the left internal carotid artery   beginning at its origin. No flow related enhancement of the vessel beyond   its origin.    Recommend correlation with contrast-enhanced MRI of the neck for further   evaluation.    --- End of Report ---            MIGUEL ANGEL CARRILLO MD; Attending Radiologist  This document has been electronically signed. May 12 2022  2:47PM    < end of copied text >

## 2022-05-17 NOTE — PROGRESS NOTE ADULT - ASSESSMENT
Impression:  This is a 67 year old gentleman pmhx CAD s/p MI/PCI/CABG, hx TIA, afib on rivaroxaban, HTN, DM2, PVD, gout, L CEA 2014, and CKD who presented to [facility] 4/11/22 with ANT, tremors, confusion and lethargy.  CT head no acute changes.  S/p initiation of hemodialysis.  Mental status has improved dramatically.  Pt denies any headaches, no slurred speech, no hemiparesis.  He has chronic gout with bilateral finger swelling, pain.  He also has chronic neuropathy.  Both legs are weak.      Persistent encephalopathy prompted re-consultation on 5/10/22.  MRI brain was ordered and revealed small acute infarctions in bilateral posterior centrum semiovale and left corona radiata and left posterior periventricular white matter.  Carotid ultrasound was performed showing new occlusion of the left internal carotid artery, along with greater than 70% stenosis involving the distal right common carotid artery, and mild to moderate flow-limiting stenosis is seen at the left external carotid artery.  Vascular surgery has been consulted and is recommending CTA.  He continues to be encephalopathic and lethargic.      Diagnosis and Recommendations:  Small scattered infarcts  - Infarcts not causing encephalopathy but likely reflective of fact that brain is being perfused by right CCA/ICA that in itself shows severe stenosis.    - MRA was suboptimal not visualing the neck but MRA head showed left KESHAWN coming from ACOM and left PCA feeding left MCA.  ICA showed no flow.    - in setting of atrial fibrillation may well be cardioembolic, continue anticoagulation as per cardiology  - s/p carotid doppler, suboptimal CTA/MRA.  However, it is apparent that there is a proximal left ICA occlusion, which would not be a candidate for intervention.  Intervention for right CCA/ICA stenosis would be high risk but it may indeed by a symptomatic stenosis.   - ECHO in February 2022 did not reveal severe cardiomyopathy, vegetation, or LV thrombus.    - HgA1c of 10.1 in February 2022 may be driving stroke risk.  Goal is < 7  - on asa/lipitor  -appreciate vasc notes, no further inpt workup for these vascular issues in light of overall medical issues.  To be reassessed as outpt as per vascular  no further neuro inpt workup needed

## 2022-05-17 NOTE — PROGRESS NOTE ADULT - ASSESSMENT
67 year old gentleman with PMH of CAD, NSTEMI s/p 3 stents in 2014 (stents to LAD, proximal and distal LCx), ischemic cardiomyopathy, HTN for 10 years, T2DM for 26 years c/b peripheral neuropathy, CVA, TIA, Afib on Pradaxa, chronic RLE wound, L carotid stenosis s/p endarterectomy (2014) just recently admitted  to the [facility] on 2/19/2022 with acute onset chest pain for one day found to have an NSTEMI, CAD, s/p PCI in setting of increased AF rates off bb for 3 days and resolved chest pain, his CKMB peaked and Echo noted with EF 60%,normal LV function, mild TR, mild VT. He was s/p [facility] with 85% proximal LAD s/p balloon angioplasty and LULU. He presented to [facility] ED with decreased urine output over the week. [facility] and was started  on Nitrofurantoin. Pt is still taking Nitrofurantoin w/ 5 days left. He came to the ED due to worsening symptoms. Pt reports having a similar incident 4-5 years ago that resolved spontaneously. He reports increased leg edema Dyspnea worse on exertion. his baseline Serum creatinine is in the 2.0 to 2.3 mg/dl range. ANT with serum creatinine of 8.29 with bun 144 and k 5.5      1- ANT on ckd III ---> ESRD likely   2- chf chronic   3- hyperphosphatemia /shpt   4- anemia   5- hyperlipidemia   6- HTN /a fib  7- TIA hx   8- hx of carotid stenosis      HD today  F200, 225 min, , , 1L fluid removal  see HD flowsheet  renvela 2 tab with meals   hydralazine 25 mg tid  anemia - epogen 01233 Units TIW with HD.     Recheck iron profile and ferritin in am  PT  seroquel 25 mg daily  vascular follow up outpatient in 4-6 weeks  discharge planning per primary team 67 year old gentleman with PMH of CAD, NSTEMI s/p 3 stents in 2014 (stents to LAD, proximal and distal LCx), ischemic cardiomyopathy, HTN for 10 years, T2DM for 26 years c/b peripheral neuropathy, CVA, TIA, Afib on Pradaxa, chronic RLE wound, L carotid stenosis s/p endarterectomy (2014) just recently admitted  to the [facility] on 2/19/2022 with acute onset chest pain for one day found to have an NSTEMI, CAD, s/p PCI in setting of increased AF rates off bb for 3 days and resolved chest pain, his CKMB peaked and Echo noted with EF 60%,normal LV function, mild TR, mild ME. He was s/p [facility] with 85% proximal LAD s/p balloon angioplasty and LULU. He presented to [facility] ED with decreased urine output over the week. [facility] and was started  on Nitrofurantoin. Pt is still taking Nitrofurantoin w/ 5 days left. He came to the ED due to worsening symptoms. Pt reports having a similar incident 4-5 years ago that resolved spontaneously. He reports increased leg edema Dyspnea worse on exertion. his baseline Serum creatinine is in the 2.0 to 2.3 mg/dl range. ATN with serum creatinine of 8.29 with bun 144 and k 5.5      1- ANT on ckd III ---> ESRD likely   2- chf chronic   3- hyperphosphatemia /shpt   4- anemia   5- hyperlipidemia   6- HTN /a fib  7- TIA hx   8- hx of carotid stenosis      HD today  F200, 225 min, , , 1L fluid removal  see HD flowsheet  renvela 2 tab with meals   hydralazine 25 mg tid  anemia - epogen 68015 Units TIW with HD.     iron deficiency; Venofer 100 mg IVP with HD today  PT  seroquel 25 mg daily  vascular follow up outpatient in 4-6 weeks  discharge planning per primary team

## 2022-05-18 NOTE — PROGRESS NOTE ADULT - SUBJECTIVE AND OBJECTIVE BOX
DATE OF SERVICE: 05-18-22 @ 13:02  CHIEF COMPLAINT:Patient is a 67y old  Male who presents with a chief complaint of Decreased urine output for the past week, leg oedema (18 May 2022 12:57)    	        PAST MEDICAL & SURGICAL HISTORY:  HTN (hypertension), benign      HLD (hyperlipidemia)      DM type 2 (diabetes mellitus, type 2)      TIA (transient ischemic attack)      Atrial fibrillation      MI (myocardial infarction)  circa 2014 and 2022.      CAD (coronary artery disease)      Neuropathy      Stage 3 chronic kidney disease      2019 novel coronavirus disease (COVID-19)  12/2021.  Received the COVID-19 vaccine x 2 as of April 2022.      Status post angioplasty with stent  LULU x 3 2/7/2014      S/P carotid endarterectomy  left                RESPIRATORY: No cough, wheezing, chills or hemoptysis; No Shortness of Breath  CARDIOVASCULAR: No chest pain, palpitations, passing out, dizziness,   GASTROINTESTINAL: No abdominal or epigastric pain. No nausea, vomiting, or hematemesis;   GENITOURINARY: No dysuria, frequency, hematuria,  NEUROLOGICAL: No headaches,     Medications:  MEDICATIONS  (STANDING):  allopurinol 100 milliGRAM(s) Oral daily  apixaban 5 milliGRAM(s) Oral two times a day  aspirin enteric coated 81 milliGRAM(s) Oral daily  atorvastatin 40 milliGRAM(s) Oral at bedtime  chlorhexidine 4% Liquid 1 Application(s) Topical <User Schedule>  dextrose 50% Injectable 25 Gram(s) IV Push once  dextrose 50% Injectable 12.5 Gram(s) IV Push once  dextrose 50% Injectable 25 Gram(s) IV Push once  diltiazem    milliGRAM(s) Oral daily  hydrALAZINE 25 milliGRAM(s) Oral three times a day  insulin lispro (ADMELOG) corrective regimen sliding scale   SubCutaneous three times a day before meals  insulin lispro (ADMELOG) corrective regimen sliding scale   SubCutaneous at bedtime  iron sucrose Injectable 100 milliGRAM(s) IV Push once  lidocaine   4% Patch 1 Patch Transdermal daily  metoprolol succinate  milliGRAM(s) Oral daily  mupirocin 2% Ointment 1 Application(s) Both Nostrils two times a day  polyethylene glycol 3350 17 Gram(s) Oral two times a day  povidone iodine 10% Solution 1 Application(s) Topical daily  QUEtiapine 25 milliGRAM(s) Oral at bedtime  senna 2 Tablet(s) Oral at bedtime  sevelamer carbonate Powder 1600 milliGRAM(s) Oral three times a day with meals    MEDICATIONS  (PRN):  bisacodyl 5 milliGRAM(s) Oral every 12 hours PRN Constipation  dextrose Oral Gel 15 Gram(s) Oral once PRN Blood Glucose LESS THAN 70 milliGRAM(s)/deciliter  sodium chloride 0.9% lock flush 10 milliLiter(s) IV Push every 1 hour PRN Pre/post blood products, medications, blood draw, and to maintain line patency    	    PHYSICAL EXAM:  T(C): 36.5 (05-18-22 @ 11:50), Max: 36.6 (05-18-22 @ 05:15)  HR: 85 (05-18-22 @ 11:50) (81 - 98)  BP: 128/62 (05-18-22 @ 11:50) (111/63 - 149/64)  RR: 17 (05-18-22 @ 11:50) (17 - 18)  SpO2: 99% (05-18-22 @ 11:50) (95% - 99%)  Wt(kg): --  I&O's Summary    17 May 2022 07:01  -  18 May 2022 07:00  --------------------------------------------------------  IN: 560 mL / OUT: 0 mL / NET: 560 mL    18 May 2022 07:01  -  18 May 2022 13:02  --------------------------------------------------------  IN: 100 mL / OUT: 0 mL / NET: 100 mL        Appearance: Normal	  HEENT:   Normal oral mucosa, PERRL, EOMI	  Cardiovascular: Normal S1 S2, No JVD,   Respiratory:dec bs   Gastrointestinal:  Soft, Non-tender, + BS	    Neurologic: Non-focal  Extremities: pvd    TELEMETRY: 	    ECG:  	  RADIOLOGY:  OTHER: 	  	  LABS:	 	    CARDIAC MARKERS:            05-17    133<L>  |  95<L>  |  33<H>  ----------------------------<  214<H>  4.6   |  25  |  7.34<H>    Ca    9.2      17 May 2022 05:54      proBNP:   Lipid Profile:   HgA1c:   TSH:

## 2022-05-18 NOTE — PROGRESS NOTE ADULT - ASSESSMENT
Impression:  This is a 67 year old gentleman pmhx CAD s/p MI/PCI/CABG, hx TIA, afib on rivaroxaban, HTN, DM2, PVD, gout, L CEA 2014, and CKD who presented to [facility] 4/11/22 with ANT, tremors, confusion and lethargy.  CT head no acute changes.  S/p initiation of hemodialysis.  Mental status has improved dramatically.  Pt denies any headaches, no slurred speech, no hemiparesis.  He has chronic gout with bilateral finger swelling, pain.  He also has chronic neuropathy.  Both legs are weak.      Persistent encephalopathy prompted re-consultation on 5/10/22.  MRI brain was ordered and revealed small acute infarctions in bilateral posterior centrum semiovale and left corona radiata and left posterior periventricular white matter.  Carotid ultrasound was performed showing new occlusion of the left internal carotid artery, along with greater than 70% stenosis involving the distal right common carotid artery, and mild to moderate flow-limiting stenosis is seen at the left external carotid artery.  Vascular surgery has been consulted and is recommending CTA.  He continues to be encephalopathic and lethargic.      Diagnosis and Recommendations:  Small scattered infarcts  - Infarcts not causing encephalopathy but likely reflective of fact that brain is being perfused by right CCA/ICA that in itself shows severe stenosis.    - MRA was suboptimal not visualing the neck but MRA head showed left KESHAWN coming from ACOM and left PCA feeding left MCA.  ICA showed no flow.    - in setting of atrial fibrillation may well be cardioembolic, continue anticoagulation as per cardiology  - s/p carotid doppler, suboptimal CTA/MRA.  However, it is apparent that there is a proximal left ICA occlusion, which would not be a candidate for intervention.  Intervention for right CCA/ICA stenosis would be high risk but it may indeed by a symptomatic stenosis.   - ECHO in February 2022 did not reveal severe cardiomyopathy, vegetation, or LV thrombus.    - HgA1c of 10.1 in February 2022 may be driving stroke risk.  Goal is < 7  - on asa/lipitor  -appreciate vasc notes, no further inpt workup for these vascular issues in light of overall medical issues.  To be reassessed as outpt as per vascular  no further neuro inpt workup needed  no neuro c/i to discharge to rehab  d/w pt and RN at bedside

## 2022-05-18 NOTE — PROGRESS NOTE ADULT - SUBJECTIVE AND OBJECTIVE BOX
Admitting Diagnosis:  Chronic kidney disease [N18.9]  CHRONIC KIDNEY DISEASE, UNSPECIFIED    Background:  This is a 67 year old gentleman pmhx CAD s/p MI/PCI/CABG, hx TIA, afib on rivaroxaban, HTN, DM2, PVD, gout, L CEA 2014, and CKD who presented to [facility] 4/11/22 with ANT, tremors, confusion and lethargy.  CT head no acute changes.  S/p initiation of hemodialysis.  Mental status has improved dramatically.  Pt denies any headaches, no slurred speech, no hemiparesis.  He has chronic gout with bilateral finger swelling, pain.  He also has chronic neuropathy.  Both legs are weak.  MRI showing scattered infarcts likely in setting of atrial fibrillation and both intracranial and extracranial atherosclerotic disease.    Interval Hx:  pt says feeling better    Past Medical History:  HTN (hypertension), benign [401.1]  HLD (hyperlipidemia) [272.4]  DM type 2 (diabetes mellitus, type 2) [250.00]  TIA (transient ischemic attack) [435.9]  Atrial fibrillation [427.31]  MI (myocardial infarction) [410.90]  circa 2014 and 2022.  CAD (coronary artery disease) [414.00]  Neuropathy [G62.9]  Stage 3 chronic kidney disease [N18.30]  2019 novel coronavirus disease (COVID-19) [U07.1]  12/2021.  Received the COVID-19 vaccine x 2 as of April 2022.    Past Surgical History:  Status post angioplasty with stent [V45.82]  LULU x 3 2/7/2014  S/P carotid endarterectomy [Z98.89]  left  S/P CABG (coronary artery bypass graft) [Z95.1]        Social History:  No toxic habits    Family History:  FAMILY HISTORY:  Family history of heart disease          ROS: as per HPI.    Medications:  allopurinol 100 milliGRAM(s) Oral daily  apixaban 5 milliGRAM(s) Oral two times a day  aspirin enteric coated 81 milliGRAM(s) Oral daily  atorvastatin 40 milliGRAM(s) Oral at bedtime  bisacodyl 5 milliGRAM(s) Oral every 12 hours PRN  chlorhexidine 4% Liquid 1 Application(s) Topical <User Schedule>  dextrose 50% Injectable 25 Gram(s) IV Push once  dextrose 50% Injectable 25 Gram(s) IV Push once  dextrose 50% Injectable 12.5 Gram(s) IV Push once  dextrose Oral Gel 15 Gram(s) Oral once PRN  diltiazem    milliGRAM(s) Oral daily  hydrALAZINE 25 milliGRAM(s) Oral three times a day  insulin lispro (ADMELOG) corrective regimen sliding scale   SubCutaneous three times a day before meals  insulin lispro (ADMELOG) corrective regimen sliding scale   SubCutaneous at bedtime  iron sucrose Injectable 100 milliGRAM(s) IV Push once  lidocaine   4% Patch 1 Patch Transdermal daily  metoprolol succinate  milliGRAM(s) Oral daily  mupirocin 2% Ointment 1 Application(s) Both Nostrils two times a day  polyethylene glycol 3350 17 Gram(s) Oral two times a day  povidone iodine 10% Solution 1 Application(s) Topical daily  QUEtiapine 25 milliGRAM(s) Oral at bedtime  senna 2 Tablet(s) Oral at bedtime  sevelamer carbonate Powder 1600 milliGRAM(s) Oral three times a day with meals  sodium chloride 0.9% lock flush 10 milliLiter(s) IV Push every 1 hour PRN      Labs:    05-17    133<L>  |  95<L>  |  33<H>  ----------------------------<  214<H>  4.6   |  25  |  7.34<H>    Ca    9.2      17 May 2022 05:54      CAPILLARY BLOOD GLUCOSE      POCT Blood Glucose.: 192 mg/dL (18 May 2022 08:13)  POCT Blood Glucose.: 207 mg/dL (17 May 2022 20:55)  POCT Blood Glucose.: 159 mg/dL (17 May 2022 17:48)  POCT Blood Glucose.: 258 mg/dL (17 May 2022 12:06)              Vitals:  Vital Signs Last 24 Hrs  T(C): 36.6 (18 May 2022 05:15), Max: 36.6 (17 May 2022 11:10)  T(F): 97.9 (18 May 2022 05:15), Max: 97.9 (17 May 2022 11:10)  HR: 94 (18 May 2022 05:15) (78 - 98)  BP: 149/64 (18 May 2022 05:15) (111/63 - 149/64)  BP(mean): --  RR: 18 (18 May 2022 05:15) (17 - 18)  SpO2: 97% (18 May 2022 05:15) (95% - 98%)  NEUROLOGICAL EXAM:    Mental status: awake alert, and in no apparent distress. Oriented to person, knows he is in a hospital but not which one, year 2022 something, when asked president says who cares and would not try to guess   Language intact.  Attention impaired.      Cranial Nerves: Pupils were equal, round, reactive to light. Extraocular movements were intact. B BTT Facial sensation was intact to light touch. There was no facial asymmetry. The palate was upgoing symmetrically and tongue was midline. Hearing acuity was intact to finger rub AU. Shoulder shrug was full bilaterally    Motor exam: Bulk and tone were normal. moves all ext.  Fine finger movements were symmetric. There was bilateral symmetric pronator drift    Reflexes: DTRs depressed, flexor plantars.     Sensation: Intact to noxious, seems intact to light touch, due to decreased attention did not assess other modalities     Coordination: no gross dysmetria    Gait: deferred    NIHSS: 13    Imaging:    ACC: [id number] EXAM:  CT BRAIN                        PROCEDURE DATE:  04/09/2022      INTERPRETATION:  CLINICAL INFORMATION:  Altered mental status, on   anticoagulation .    TECHNIQUE: Multiple contiguous axial images were acquired from the   skullbase to the vertex without the administration of intravenous   contrast.  Coronal and sagittal reformations were made.    COMPARISON: CT head 10/21/2021, brain MRI 02/23/2014.    FINDINGS:    Scattered lacunar infarcts in the bilateral cerebralhemispheres are   grossly stable compared to the 10/21/2021 head CT. No new additional   infarcts are noted over the time interval.    No acute intracranial hemorrhage, mass effect, or midline shift. The   brain demonstrates periventricular hypoattenuation, nonspecific in   etiology but likely representing chronic microvascular ischemic changes.    No abnormal extra-axial fluid collections are present.    The ventricles and sulci demonstrate age appropriate involutional   changes.  The basal cisterns are patent.    The orbits are unremarkable. The paranasal sinuses are clear. The mastoid   air cells are clear. The calvarium is intact.    IMPRESSION:    No acute intracranial abnormality is noted. If the patient has new and   persistent symptoms, consider short interval follow-up head CT or brain   MRI.    --- End of Report ---    GATITO VILLARREAL MD; Resident Radiologist  This document has been electronically signed.  JESSE CORONA MD; Attending Radiologist  This document has been electronically signed. Apr 9 2022  2:00PM        ACC: [id number] EXAM:  MR BRAIN                          PROCEDURE DATE:  05/11/2022          INTERPRETATION:  MR brain  without gadolinium    CLINICAL INFORMATION: Stroke.    TECHNIQUE: Limited Sagittal T1-weighted images, axial FLAIR images, and   axial diffusion weighted images of the brain were obtained.  Patient was   unable to hold still for remaining sequences.    FINDINGS:   MRI dated 02/23/2014 available for review.    The brain demonstrates small acute infarctions in the BILATERAL posterior   centrum semiovale and LEFT corona radiata and LEFT posterior   periventricular white matter with restricted diffusion. No intracranial   hemorrhage is recognized. No mass effect is found in the brain.    The ventricles, sulci and basal cisterns show mild global atrophy most   prominent within the BILATERAL cerebellum.    The vertebral and internal carotid arteries demonstrate expected flow   voids indicating their patency.    The orbits are unremarkable.  The paranasal sinuses are clear.  The nasal   cavity appears intact.  The nasopharynx is symmetric.  The central skull   base and temporal bones are intact.  The calvarium appears unremarkable.    IMPRESSION: Small acute infarctions in the BILATERAL posterior centrum   semiovale and LEFT corona radiata and LEFT posterior periventricular   white matter with restricted diffusion.   mild global atrophy most   prominent within the BILATERAL cerebellum.    --- End of Report ---            JESÚS PADGETT MD; Attending Radiologist  This document has been electronically signed. May 11 2022  5:47PM        Patient name: DYLAN DEL CASTILLO  YOB: 1955   Age: 67 (M)   MR#: [id number]  Study Date: 2/22/2022  [facility]  Study quality: Technically difficult  Referring Physician: Mendoza Corral MD  Blood Pressure: 152/82 mmHg  Height: 180 cm  Weight: 136 kg  BSA: 2.5 m2  ------------------------------------------------------------------------  PROCEDURE: Transthoracic echocardiogram with 2-D, M-Mode  and complete spectral and color flow Doppler. Verbal  consent was obtained for injection of  Ultrasonic Enhancing  Agent following a discussion of risks and benefits.  Following intravenous injection of Ultrasonic Enhancing  Agent , harmonic imaging was performed.  INDICATION: Chest pain, unspecified (R07.9)  ------------------------------------------------------------------------  Dimensions:    Normal Values:  LA:     4.4    2.0 - 4.0 cm  Ao:     4.0    2.0 - 3.8 cm  SEPTUM: 1.1    0.6 - 1.2 cm  PWT:    0.9    0.6 - 1.1 cm  LVIDd:  5.0    3.0 - 5.6 cm  LVIDs:  3.5    1.8 - 4.0 cm  Derived variables:  LVMI: 73 g/m2  RWT: 0.36  EF (Visual Estimate): 60 %  Doppler Peak Velocity (m/sec): AoV=1.5 TV=2.2  ------------------------------------------------------------------------  Observations:  Mitral Valve: Normal mitral valve.  Aortic Valve/Aorta: Calcified aortic valve with normal  opening. Minimal aortic regurgitation.  Normal aortic root size.  Left Atrium: Mildly dilated left atrium.  Left Ventricle: Endocardial visualization enhanced with  intravenous injection of Ultrasonic Enhancing Agent  (Definity).  Normal left ventricular internal dimensions and wall  thickness.  Normal left ventricular systolic function. No segmental  wall motion abnormalities.  Right Heart: Normal right atrium. Normal right ventricular  size and function.  Normal tricuspid valve. Mild tricuspid regurgitation.  Normal pulmonic valve. Mild pulmonic regurgitation.  Pericardium/Pleura: Normal pericardium with no pericardial  effusion.  Hemodynamic: No evidence of pulmonary hypertension.  ------------------------------------------------------------------------  Conclusions:  Endocardial visualization enhanced with intravenous  injection of Ultrasonic Enhancing Agent (Definity).  Normal left ventricular systolic function. No segmental  wall motion abnormalities.  ------------------------------------------------------------------------  Confirmed on  2/22/2022 - 16:17:39 by Ishan Ackerman MD, FASE  ------------------------------------------------------------------------    < from: CT Angio Head w/ IV Cont (05.12.22 @ 13:34) >    ACC: [id number] EXAM:  CT ANGIO NECK (W)AW IC                        ACC: [id number] EXAM:  CT ANGIO BRAIN (W)AW IC                          PROCEDURE DATE:  05/12/2022          INTERPRETATION:  CT angiography of the brain and neck    CLINICAL INDICATION: Recent infarcts. Carotid stenosis.    TECHNIQUE: Direct axial CT scanning of the brain and neck was obtained   from the vertex to the level of the clavicular heads after the dynamic   intravenous injection of 44 cc of Omnipaque 300. 0 cc were discarded.   Sagittal and coronal maximum intensity projection reformats were   provided.  Three-dimensional reconstructions were performed by the   radiologist using the [employer] workstation.    Additionally, noncontrast axial CT scanning of the brain was obtained   from the skull base to the vertex. Sagittal and coronal reformats were   provided.    COMPARISON: MRA neck 10/24/2021    FINDINGS:    CT brain:    Previously seen acute infarcts in the bilateral cerebral hemispheres are   redemonstrated. No CT evidence for hemorrhagic transformation.    No hydrocephalus, midline shift, or effacement of basal cisterns.    No acute intracranial hemorrhage or vasogenic edema.    Mild to moderate white matter microvascular ischemic disease.    CTA brain:    Limited by relatively decreased opacification of the arteries.    Multifocal narrowing of the right skull base ICA due to calcified plaque,   the degree of which is not well evaluated.    Normal flow-related enhancement of the supraclinoid right ICA.    Lack of flow related enhancement of the petrous, precavernous, and   cavernous left ICA. Reconstitution of the vessel at its supraclinoid   segment.    Otherwise no flow-limiting stenosis.    Normal flow-related enhancement of the bilateral anterior, middle, and   posterior cerebral arteries.    Normal flow-related enhancement of the intradural vertebral arteries and   basilar artery.    No vascular aneurysm or AVM.    CTA neck:    Stenoses described in this report are on the basis of NASCET criteria.    Study is significantly limited by relatively decreased opacification of   the arteries.    Short segment stenosis of the mid left common carotid artery due to the   presence of partially calcified plaque is poorly evaluated. Flow-related   enhancement of the more proximal and distal left common carotid artery is   noted.    Long segment stenosis of the mid and distal right common carotid artery   due to the presence of partially calcified plaque is poorly evaluated.    Rapidly progressive stenosis of the left internal carotid artery   beginning at its origin. No flow related enhancement of the vessel beyond   its origin.    Normal flow-related enhancement of the bilateral vertebral arteries.    No gross evidence for acute arterial dissection.    IMPRESSION:    CTA brain:  Limited by relatively decreased opacification of the arteries.    Multifocal narrowing of the right skull base ICA due to calcified plaque,   the degree of which is not well evaluated.    Lack of flow related enhancement of the petrous, precavernous, and   cavernous left ICA. Reconstitution of the vessel at its supraclinoid   segment.    Otherwise no flow-limiting stenosis.    No vascular aneurysm or AVM.    CTA neck:  Limited by relatively decreased opacification of the arteries.    Short segment stenosis of the mid left common carotid artery due to the   presence of partially calcified plaque is poorly evaluated. Flow-related   enhancement of the more proximal and distal left common carotid artery is   noted.    Long segment stenosis of the mid and distal right common carotid artery   due to the presence of partially calcified plaque is poorly evaluated.    Rapidly progressive stenosis of the left internal carotid artery   beginning at its origin. No flow related enhancement of the vessel beyond   its origin.    Recommend correlation with contrast-enhanced MRI of the neck for further   evaluation.    --- End of Report ---            MIGUEL ANGEL CARRILLO MD; Attending Radiologist  This document has been electronically signed. May 12 2022  2:47PM    < end of copied text >

## 2022-05-18 NOTE — PROGRESS NOTE ADULT - ASSESSMENT
67 year old gentleman with PMH of CAD, NSTEMI s/p 3 stents in 2014 (stents to LAD, proximal and distal LCx), ischemic cardiomyopathy, HTN for 10 years, T2DM for 26 years c/b peripheral neuropathy, CVA, TIA, Afib on Pradaxa, chronic RLE wound, L carotid stenosis s/p endarterectomy (2014) just recently admitted  to the [facility] on 2/19/2022 with acute onset chest pain for one day found to have an NSTEMI, CAD, s/p PCI in setting of increased AF rates off bb for 3 days and resolved chest pain, his CKMB peaked and Echo noted with EF 60%,normal LV function, mild TR, mild CO. He was s/p [facility] with 85% proximal LAD s/p balloon angioplasty and LULU. He presented to [facility] ED with decreased urine output over the week. Mr. Stevens went to [facility] for hematuria and was started  on Nitrofurantoin. Pt is still taking Nitrofurantoin w/ 5 days left. He came to the ED due to worsening symptoms. Pt reports having a similar incident 4-5 years ago that resolved spontaneously. He reports increased leg edema Dyspnea worse on exertion. his baseline Serum creatinine is in the 2.0 to 2.3 mg/dl range. ANT with serum creatinine of 8.29 with bun 144 and k 5.5      1- ANT on ckd III ---> ESRD likely   2- chf chronic   3- hyperphosphatemia /shpt   4- anemia   5- hyperlipidemia   6- HTN /a fib  7- TIA hx   8- hx of carotid stenosis      HD am   renvela 2 tab with meals   hydralazine 25 mg tid to be dc given orthostatic as well as ns 250 cc/   anemia - epogen 16905 Units TIW with HD.     iron deficiency; Venofer 100 mg IVP with HD  PT  seroquel 25 mg daily  vascular follow up outpatient in 4-6 weeks

## 2022-05-18 NOTE — PROGRESS NOTE ADULT - SUBJECTIVE AND OBJECTIVE BOX
[facility]   [phone]  RENAL FOLLOW UP NOTE  --------------------------------------------------------------------------------  Chief Complaint:    24 hour events/subjective:    seen earlier   wife at bedside  pt today had orthostatics with PT     PAST HISTORY  --------------------------------------------------------------------------------  No significant changes to PMH, PSH, FHx, SHx, unless otherwise noted    ALLERGIES & MEDICATIONS  --------------------------------------------------------------------------------  Allergies    nitrofurantoin (Nephrotoxicity)    Intolerances      Standing Inpatient Medications  allopurinol 100 milliGRAM(s) Oral daily  apixaban 5 milliGRAM(s) Oral two times a day  aspirin enteric coated 81 milliGRAM(s) Oral daily  atorvastatin 40 milliGRAM(s) Oral at bedtime  chlorhexidine 4% Liquid 1 Application(s) Topical <User Schedule>  dextrose 50% Injectable 25 Gram(s) IV Push once  dextrose 50% Injectable 25 Gram(s) IV Push once  dextrose 50% Injectable 12.5 Gram(s) IV Push once  diltiazem    milliGRAM(s) Oral daily  insulin lispro (ADMELOG) corrective regimen sliding scale   SubCutaneous three times a day before meals  insulin lispro (ADMELOG) corrective regimen sliding scale   SubCutaneous at bedtime  iron sucrose Injectable 100 milliGRAM(s) IV Push once  lidocaine   4% Patch 1 Patch Transdermal daily  metoprolol succinate  milliGRAM(s) Oral daily  mupirocin 2% Ointment 1 Application(s) Both Nostrils two times a day  polyethylene glycol 3350 17 Gram(s) Oral two times a day  povidone iodine 10% Solution 1 Application(s) Topical daily  QUEtiapine 25 milliGRAM(s) Oral at bedtime  senna 2 Tablet(s) Oral at bedtime  sevelamer carbonate Powder 1600 milliGRAM(s) Oral three times a day with meals  sodium chloride 0.9%. 250 milliLiter(s) IV Continuous <Continuous>    PRN Inpatient Medications  bisacodyl 5 milliGRAM(s) Oral every 12 hours PRN  dextrose Oral Gel 15 Gram(s) Oral once PRN  sodium chloride 0.9% lock flush 10 milliLiter(s) IV Push every 1 hour PRN      REVIEW OF SYSTEMS  --------------------------------------------------------------------------------  not giving ros     VITALS/PHYSICAL EXAM  --------------------------------------------------------------------------------  T(C): 36.6 (05-18-22 @ 20:40), Max: 36.6 (05-18-22 @ 05:15)  HR: 94 (05-18-22 @ 20:40) (85 - 94)  BP: 176/70 (05-18-22 @ 20:40) (128/62 - 176/70)  RR: 18 (05-18-22 @ 20:40) (17 - 18)  SpO2: 94% (05-18-22 @ 20:40) (94% - 99%)  Wt(kg): --        05-17-22 @ 07:01  -  05-18-22 @ 07:00  --------------------------------------------------------  IN: 560 mL / OUT: 0 mL / NET: 560 mL    05-18-22 @ 07:01  -  05-18-22 @ 21:51  --------------------------------------------------------  IN: 100 mL / OUT: 0 mL / NET: 100 mL      Physical Exam:  	                Gen: appears comfortable more communicative   	Pulm: Decreased breath sounds b/l bases.  	CV: No JVD. IRR,   	Abd: +BS, soft, nontender, softly distended, obese               Extremity no edema              Extremity LE: + hyperpigmented bilateral LE with no edema              Vascular: R IJ HD catheter, L arm AVF       LABS/STUDIES  --------------------------------------------------------------------------------    133  |  95  |  33  ----------------------------<  214      [05-17-22 @ 05:54]  4.6   |  25  |  7.34        Ca     9.2     [05-17-22 @ 05:54]            Creatinine Trend:  SCr 7.34 [05-17 @ 05:54]  SCr 5.98 [05-16 @ 06:28]  SCr 4.22 [05-15 @ 07:37]  SCr 7.34 [05-12 @ 17:15]  SCr 8.68 [05-10 @ 15:00]                  Iron 21, TIBC 245, %sat 9      [05-17-22 @ 05:54]  Ferritin 120      [05-17-22 @ 05:54]  PTH -- (Ca 8.3)      [04-15-22 @ 12:18]   150  PTH -- (Ca --)      [04-03-22 @ 23:06]   97  HbA1c 11.7      [11-07-19 @ 09:14]  TSH 1.71      [05-12-22 @ 07:11]

## 2022-05-18 NOTE — PROGRESS NOTE ADULT - SUBJECTIVE AND OBJECTIVE BOX
CARDIOLOGY FOLLOW UP - Dr. Mazariegos  DATE OF SERVICE: 5/18/22     CC no acute events       REVIEW OF SYSTEMS:  CONSTITUTIONAL: No fever, weight loss, or fatigue  RESPIRATORY: No cough, wheezing, chills or hemoptysis; No Shortness of Breath  CARDIOVASCULAR: No chest pain, palpitations, passing out, dizziness, or leg swelling  GASTROINTESTINAL: No abdominal or epigastric pain. No nausea, vomiting, or hematemesis; No diarrhea or constipation. No melena or hematochezia.  VASCULAR: No edema     PHYSICAL EXAM:  T(C): 36.5 (05-18-22 @ 11:50), Max: 36.6 (05-18-22 @ 05:15)  HR: 85 (05-18-22 @ 11:50) (81 - 98)  BP: 128/62 (05-18-22 @ 11:50) (111/63 - 149/64)  RR: 17 (05-18-22 @ 11:50) (17 - 18)  SpO2: 99% (05-18-22 @ 11:50) (95% - 99%)  Wt(kg): --  I&O's Summary    17 May 2022 07:01  -  18 May 2022 07:00  --------------------------------------------------------  IN: 560 mL / OUT: 0 mL / NET: 560 mL    18 May 2022 07:01  -  18 May 2022 12:57  --------------------------------------------------------  IN: 100 mL / OUT: 0 mL / NET: 100 mL        Appearance: Normal	  Cardiovascular: Normal S1 S2 irreg  Respiratory: Lungs clear to auscultation	  Gastrointestinal:  Soft, Non-tender, + BS	  Extremities: Normal range of motion, No clubbing, cyanosis or edema      Home Medications:  allopurinol 100 mg oral tablet: 1 tab(s) orally once a day (03 Apr 2022 06:34)  aspirin 81 mg oral delayed release tablet: 1 tab(s) orally once a day (03 Apr 2022 06:34)  bumetanide 2 mg oral tablet: 1 tab(s) orally once a day (03 Apr 2022 06:34)  insulin glargine 100 units/mL subcutaneous solution: 25 unit(s) subcutaneous once a day (at bedtime) (03 Apr 2022 06:34)  metOLazone 5 mg oral tablet: 1 tab(s) orally 3 times a week (03 Apr 2022 06:34)  metoprolol succinate 50 mg oral tablet, extended release: 2 tab(s) orally once a day (03 Apr 2022 06:34)  NovoLOG 100 units/mL subcutaneous solution: subcutaneous 4 times a day (before meals and at bedtime) (03 Apr 2022 06:34)  NovoLOG FlexPen 100 units/mL injectable solution: 10 unit(s) subcutaneous 3 times a day (03 Apr 2022 06:34)  oxycodone-acetaminophen 5 mg-325 mg oral tablet: 1 tab(s) orally 2 times a day (03 Apr 2022 06:34)  Pradaxa 150 mg oral capsule: 1 cap(s) orally 2 times a day (03 Apr 2022 06:34)  pregabalin 100 mg oral capsule: 1 cap(s) orally 3 times a day (03 Apr 2022 06:34)      MEDICATIONS  (STANDING):  allopurinol 100 milliGRAM(s) Oral daily  apixaban 5 milliGRAM(s) Oral two times a day  aspirin enteric coated 81 milliGRAM(s) Oral daily  atorvastatin 40 milliGRAM(s) Oral at bedtime  chlorhexidine 4% Liquid 1 Application(s) Topical <User Schedule>  dextrose 50% Injectable 25 Gram(s) IV Push once  dextrose 50% Injectable 25 Gram(s) IV Push once  dextrose 50% Injectable 12.5 Gram(s) IV Push once  diltiazem    milliGRAM(s) Oral daily  hydrALAZINE 25 milliGRAM(s) Oral three times a day  insulin lispro (ADMELOG) corrective regimen sliding scale   SubCutaneous three times a day before meals  insulin lispro (ADMELOG) corrective regimen sliding scale   SubCutaneous at bedtime  iron sucrose Injectable 100 milliGRAM(s) IV Push once  lidocaine   4% Patch 1 Patch Transdermal daily  metoprolol succinate  milliGRAM(s) Oral daily  mupirocin 2% Ointment 1 Application(s) Both Nostrils two times a day  polyethylene glycol 3350 17 Gram(s) Oral two times a day  povidone iodine 10% Solution 1 Application(s) Topical daily  QUEtiapine 25 milliGRAM(s) Oral at bedtime  senna 2 Tablet(s) Oral at bedtime  sevelamer carbonate Powder 1600 milliGRAM(s) Oral three times a day with meals      TELEMETRY: afib 80 	    ECG:  	  RADIOLOGY:   DIAGNOSTIC TESTING:  [ ] Echocardiogram:  [ ]  Catheterization:  [ ] Stress Test:    OTHER: 	    LABS:	 	        05-17    133<L>  |  95<L>  |  33<H>  ----------------------------<  214<H>  4.6   |  25  |  7.34<H>    Ca    9.2      17 May 2022 05:54

## 2022-05-18 NOTE — PROGRESS NOTE ADULT - ASSESSMENT
·  Problem:[email or web address] hemodialysis per renal  to cont as per renal     Problem/Plan - 2:  ·  Problem: Chronic atrial fibrillation. c/w a/c  cards f/u     Problem/Plan - 3:  ·  Problem: Cystitis.   ID follow up appreciated        Problem/Plan - 4:  ·  Problem: Type 2 diabetes mellitus. c/w insulin   endo f/u      Problem/Plan - 5:  ·  Problem: CAD (coronary artery disease).   rapid a fib  meds adjusted  hr stable  c/w a/c       Persistent encephalopathy   neuro follow up appreciated    carotid duplex noted  cta noted  mra/ mri noted  tx plans as per vasc / neuro   vasc to f/u  no plans for intervention now      rehab

## 2022-05-18 NOTE — PROGRESS NOTE ADULT - ASSESSMENT
A/P    68 y/o M with history of CAD, s/p multiple PCI, with most recent 2/22 PCI of LAD in setting of NSTEMI, on ASA only (pradaxa), chronic atrial fibrillation maintained on Pradaxa, essential HTN, type 2 DM previously on oral medications but on insulin, diabetic neuropathy with chronic RIGHT LE venous stasis changes>left, LEFT foot ulcer seen by podiatry, past endarterectomy LEFT CEA in 2014 presenting with 1 week of decreased urine output, cystitis with hematuria     #ANT on CKD, new HD  -oliguric renal failure in setting of UTI/cystitis  -New HD for uremia, renal failure  -sp rrt 4/12 for uncontrolled bleeding sp  right groin shiley removal  - monitor h/h - stable  -s/p L AVG 4/18  -s/p permacath placement 4/20  -renal f/ u    #AMS/Lethargy  -recent ams from pain meds  -AMS sp RRT 4/27  likely secondary to pain med   -repeat CT 4/27 unremarkable --  discontinued Percocet  -MRI 5/11  revealed small acute infarctions in bilateral posterior centrum semiovale and left corona radiata and left posterior periventricular white matter  -on asa statin  -repeat echo with no interval changes  -CTa head neck noted; neuro following     #Acute on chronic HFpEF  -stable  -continue volume removal with HD  -c/w bb  -repeat echo with stable borderline lv fxn    #Chronic AF  -rates stable   -c/w metoprolol succ 100 mg qd  -c/w dilt cd 120mg daily    -brief pause noted- likely secondary to MOR  -c/w eliquis 5 mg BID for now - heme stable     #HTN  -stable  -c/w meds       #CAD, s/p PCI, most recent 2/2022  -stable, cont asa  -off plavix due to a/c indication  -statin     #carotid stenosis   -previous MRA  noted with Greater than 75% stenosis of the right distal common carotid artery. Approximately 60% stenosis of the proximal left internal carotid artery.  -carotid dopplers 5/11 noted :  There is new occlusion of the left internal carotid artery;  greater than 70% stenosis involving the distal right common carotid artery as was seen previously. Mild to moderate flow-limiting stenosis is seen at the left external   carotid artery.  -CTa head and neck 5/12 noted  -Continue ASA and Statin Therapy  -neuro fu  / work up / imaging per vascular   -mra head/ neck noted- per vascular outpt follow up for repeat to re eval w MRI / mra of the brain neck       DCP

## 2022-05-18 NOTE — PROGRESS NOTE ADULT - SUBJECTIVE AND OBJECTIVE BOX
Follow-up Pulm Progress Note    No new respiratory events overnight.  Denies SOB/CP.     Medications:  MEDICATIONS  (STANDING):  allopurinol 100 milliGRAM(s) Oral daily  apixaban 5 milliGRAM(s) Oral two times a day  aspirin enteric coated 81 milliGRAM(s) Oral daily  atorvastatin 40 milliGRAM(s) Oral at bedtime  chlorhexidine 4% Liquid 1 Application(s) Topical <User Schedule>  dextrose 50% Injectable 25 Gram(s) IV Push once  dextrose 50% Injectable 25 Gram(s) IV Push once  dextrose 50% Injectable 12.5 Gram(s) IV Push once  diltiazem    milliGRAM(s) Oral daily  insulin lispro (ADMELOG) corrective regimen sliding scale   SubCutaneous three times a day before meals  insulin lispro (ADMELOG) corrective regimen sliding scale   SubCutaneous at bedtime  iron sucrose Injectable 100 milliGRAM(s) IV Push once  lidocaine   4% Patch 1 Patch Transdermal daily  metoprolol succinate  milliGRAM(s) Oral daily  mupirocin 2% Ointment 1 Application(s) Both Nostrils two times a day  polyethylene glycol 3350 17 Gram(s) Oral two times a day  povidone iodine 10% Solution 1 Application(s) Topical daily  QUEtiapine 25 milliGRAM(s) Oral at bedtime  senna 2 Tablet(s) Oral at bedtime  sevelamer carbonate Powder 1600 milliGRAM(s) Oral three times a day with meals  sodium chloride 0.9%. 250 milliLiter(s) (250 mL/Hr) IV Continuous <Continuous>    MEDICATIONS  (PRN):  bisacodyl 5 milliGRAM(s) Oral every 12 hours PRN Constipation  dextrose Oral Gel 15 Gram(s) Oral once PRN Blood Glucose LESS THAN 70 milliGRAM(s)/deciliter  sodium chloride 0.9% lock flush 10 milliLiter(s) IV Push every 1 hour PRN Pre/post blood products, medications, blood draw, and to maintain line patency          Vital Signs Last 24 Hrs  T(C): 36.5 (18 May 2022 11:50), Max: 36.6 (18 May 2022 05:15)  T(F): 97.7 (18 May 2022 11:50), Max: 97.9 (18 May 2022 05:15)  HR: 85 (18 May 2022 13:07) (83 - 98)  BP: 128/68 (18 May 2022 13:47) (112/60 - 149/64)  BP(mean): --  RR: 17 (18 May 2022 11:50) (17 - 18)  SpO2: 99% (18 May 2022 13:07) (95% - 99%)          05-17 @ 07:01  -  05-18 @ 07:00  --------------------------------------------------------  IN: 560 mL / OUT: 0 mL / NET: 560 mL          LABS:    05-17    133<L>  |  95<L>  |  33<H>  ----------------------------<  214<H>  4.6   |  25  |  7.34<H>    Ca    9.2      17 May 2022 05:54            CAPILLARY BLOOD GLUCOSE      POCT Blood Glucose.: 232 mg/dL (18 May 2022 13:58)              Physical Examination:  PULM: Clear to auscultation bilaterally, no significant sputum production  CVS: S1, S2 heard    RADIOLOGY REVIEWED  CXR 5/12: small L effusion    CT chest: < from: CT Chest No Cont (04.04.22 @ 16:52) >  FINDINGS:    AIRWAYS, LUNGS, PLEURA: Tracheal secretions. Small left greater than   right pleural effusions increased compared to 2/19/2022. Right-sided   pleural thickening. Lingular and left greater than right basilar   opacification, likely atelectasis.    MEDIASTINUM: Cardiomegaly. No pericardial effusion. Thoracic aorta normal   caliber.  No large mediastinal lymph nodes.    IMAGED ABDOMEN: Nonspecific perinephric stranding.    SOFT TISSUES: Unremarkable.    BONES: Unremarkable.    IMPRESSION:.    Small left greater than right bilateral pleural effusions increased in   size compared to 2/19/2022.    Lingular and left greater than right basilar opacification, likely   atelectasis.    --- End of Report ---    < end of copied text >

## 2022-05-18 NOTE — PROGRESS NOTE ADULT - PROBLEM SELECTOR PLAN 3
Previous Accession (Optional): [id number] Previous Accession (Optional): [id number] MR head with multiple small acute infarctions  -F/u repeat echo  -Ongoing w/u for carotid stenosis   -Neuro f/u

## 2022-05-19 NOTE — PROGRESS NOTE ADULT - SUBJECTIVE AND OBJECTIVE BOX
Admitting Diagnosis:  Chronic kidney disease [N18.9]  CHRONIC KIDNEY DISEASE, UNSPECIFIED    Background:  This is a 67 year old gentleman pmhx CAD s/p MI/PCI/CABG, hx TIA, afib on rivaroxaban, HTN, DM2, PVD, gout, L CEA 2014, and CKD who presented to [facility] 4/11/22 with ANT, tremors, confusion and lethargy.  CT head no acute changes.  S/p initiation of hemodialysis.  Mental status has improved dramatically.  Pt denies any headaches, no slurred speech, no hemiparesis.  He has chronic gout with bilateral finger swelling, pain.  He also has chronic neuropathy.  Both legs are weak.  MRI showing scattered infarcts likely in setting of atrial fibrillation and both intracranial and extracranial atherosclerotic disease.    Interval Hx:  more tired today.      Past Medical History:  HTN (hypertension), benign [401.1]  HLD (hyperlipidemia) [272.4]  DM type 2 (diabetes mellitus, type 2) [250.00]  TIA (transient ischemic attack) [435.9]  Atrial fibrillation [427.31]  MI (myocardial infarction) [410.90]  circa 2014 and 2022.  CAD (coronary artery disease) [414.00]  Neuropathy [G62.9]  Stage 3 chronic kidney disease [N18.30]  2019 novel coronavirus disease (COVID-19) [U07.1]  12/2021.  Received the COVID-19 vaccine x 2 as of April 2022.    Past Surgical History:  Status post angioplasty with stent [V45.82]  LULU x 3 2/7/2014  S/P carotid endarterectomy [Z98.89]  left  S/P CABG (coronary artery bypass graft) [Z95.1]        Social History:  No toxic habits    Family History:  FAMILY HISTORY:  Family history of heart disease    Medications:  allopurinol 100 milliGRAM(s) Oral daily  apixaban 5 milliGRAM(s) Oral two times a day  aspirin enteric coated 81 milliGRAM(s) Oral daily  atorvastatin 40 milliGRAM(s) Oral at bedtime  bisacodyl 5 milliGRAM(s) Oral every 12 hours PRN  chlorhexidine 4% Liquid 1 Application(s) Topical <User Schedule>  dextrose 50% Injectable 25 Gram(s) IV Push once  dextrose 50% Injectable 12.5 Gram(s) IV Push once  dextrose 50% Injectable 25 Gram(s) IV Push once  dextrose Oral Gel 15 Gram(s) Oral once PRN  diltiazem    milliGRAM(s) Oral daily  epoetin naz-epbx (RETACRIT) Injectable 39999 Unit(s) IV Push once  insulin lispro (ADMELOG) corrective regimen sliding scale   SubCutaneous three times a day before meals  insulin lispro (ADMELOG) corrective regimen sliding scale   SubCutaneous at bedtime  iron sucrose Injectable 100 milliGRAM(s) IV Push once  lidocaine   4% Patch 1 Patch Transdermal daily  losartan 25 milliGRAM(s) Oral daily  metoprolol succinate  milliGRAM(s) Oral daily  mupirocin 2% Ointment 1 Application(s) Both Nostrils two times a day  polyethylene glycol 3350 17 Gram(s) Oral two times a day  povidone iodine 10% Solution 1 Application(s) Topical daily  QUEtiapine 25 milliGRAM(s) Oral at bedtime  senna 2 Tablet(s) Oral at bedtime  sevelamer carbonate Powder 1600 milliGRAM(s) Oral three times a day with meals  sodium chloride 0.9% lock flush 10 milliLiter(s) IV Push every 1 hour PRN  sodium chloride 0.9%. 250 milliLiter(s) IV Continuous <Continuous>      Labs:          CAPILLARY BLOOD GLUCOSE      POCT Blood Glucose.: 190 mg/dL (19 May 2022 08:15)  POCT Blood Glucose.: 209 mg/dL (19 May 2022 01:53)  POCT Blood Glucose.: 401 mg/dL (18 May 2022 23:41)  POCT Blood Glucose.: 271 mg/dL (18 May 2022 21:36)  POCT Blood Glucose.: 265 mg/dL (18 May 2022 17:22)  POCT Blood Glucose.: 232 mg/dL (18 May 2022 13:58)  POCT Blood Glucose.: 247 mg/dL (18 May 2022 12:12)              Vitals:  Vital Signs Last 24 Hrs  T(C): 36.4 (19 May 2022 04:45), Max: 36.6 (18 May 2022 20:40)  T(F): 97.6 (19 May 2022 04:45), Max: 97.9 (18 May 2022 20:40)  HR: 75 (19 May 2022 04:45) (75 - 98)  BP: 161/70 (19 May 2022 04:45) (128/62 - 176/70)  BP(mean): --  RR: 18 (19 May 2022 04:45) (17 - 18)  SpO2: 96% (19 May 2022 04:45) (94% - 99%)      ROS: as per HPI.          NEUROLOGICAL EXAM:    Mental status: awakens to voice but goes back to sleep after expressing his displeasure.  Awakens to tactile but initially requires frequent tactile input otherwise he falls asleep again.  Once fully awake, he remains awake.  He is oriented to person but does not answer other orientation questions, follow commands, or name.  Instead, he uses language appropriately to express his strong displeasure at being woken up..      Cranial Nerves: Pupils were equal, round, reactive to light. Extraocular movements were intact. B BTT Facial sensation was intact to light touch. There was no facial asymmetry. The palate was upgoing symmetrically and tongue was midline. Hearing acuity was intact to finger rub AU. Shoulder shrug was full bilaterally    Motor exam: Bulk and tone were normal. moves all ext antigravity but drifts to bed.  There is relative right leg weakness in setting of pain which has been previously present.  Fine finger movements were limited by mental status.  There was no pronator drift    Reflexes: DTRs depressed, flexor plantars.     Sensation: Intact to noxious, seems intact to light touch, due to decreased attention did not assess other modalities     Coordination: no gross dysmetria    Gait: deferred    NIHSS: 16    Imaging:    ACC: [id number] EXAM:  CT BRAIN                        PROCEDURE DATE:  04/09/2022      INTERPRETATION:  CLINICAL INFORMATION:  Altered mental status, on   anticoagulation .    TECHNIQUE: Multiple contiguous axial images were acquired from the   skullbase to the vertex without the administration of intravenous   contrast.  Coronal and sagittal reformations were made.    COMPARISON: CT head 10/21/2021, brain MRI 02/23/2014.    FINDINGS:    Scattered lacunar infarcts in the bilateral cerebralhemispheres are   grossly stable compared to the 10/21/2021 head CT. No new additional   infarcts are noted over the time interval.    No acute intracranial hemorrhage, mass effect, or midline shift. The   brain demonstrates periventricular hypoattenuation, nonspecific in   etiology but likely representing chronic microvascular ischemic changes.    No abnormal extra-axial fluid collections are present.    The ventricles and sulci demonstrate age appropriate involutional   changes.  The basal cisterns are patent.    The orbits are unremarkable. The paranasal sinuses are clear. The mastoid   air cells are clear. The calvarium is intact.    IMPRESSION:    No acute intracranial abnormality is noted. If the patient has new and   persistent symptoms, consider short interval follow-up head CT or brain   MRI.    --- End of Report ---    GATITO VILLARREAL MD; Resident Radiologist  This document has been electronically signed.  JESSE CORONA MD; Attending Radiologist  This document has been electronically signed. Apr 9 2022  2:00PM        ACC: [id number] EXAM:  MR BRAIN                          PROCEDURE DATE:  05/11/2022          INTERPRETATION:  MR brain  without gadolinium    CLINICAL INFORMATION: Stroke.    TECHNIQUE: Limited Sagittal T1-weighted images, axial FLAIR images, and   axial diffusion weighted images of the brain were obtained.  Patient was   unable to hold still for remaining sequences.    FINDINGS:   MRI dated 02/23/2014 available for review.    The brain demonstrates small acute infarctions in the BILATERAL posterior   centrum semiovale and LEFT corona radiata and LEFT posterior   periventricular white matter with restricted diffusion. No intracranial   hemorrhage is recognized. No mass effect is found in the brain.    The ventricles, sulci and basal cisterns show mild global atrophy most   prominent within the BILATERAL cerebellum.    The vertebral and internal carotid arteries demonstrate expected flow   voids indicating their patency.    The orbits are unremarkable.  The paranasal sinuses are clear.  The nasal   cavity appears intact.  The nasopharynx is symmetric.  The central skull   base and temporal bones are intact.  The calvarium appears unremarkable.    IMPRESSION: Small acute infarctions in the BILATERAL posterior centrum   semiovale and LEFT corona radiata and LEFT posterior periventricular   white matter with restricted diffusion.   mild global atrophy most   prominent within the BILATERAL cerebellum.    --- End of Report ---            JESÚS PADGETT MD; Attending Radiologist  This document has been electronically signed. May 11 2022  5:47PM        Patient name: DYLAN DEL CASTILLO  YOB: 1955   Age: 67 (M)   MR#: [id number]  Study Date: 2/22/2022  [facility]  Study quality: Technically difficult  Referring Physician: Mendoza Corral MD  Blood Pressure: 152/82 mmHg  Height: 180 cm  Weight: 136 kg  BSA: 2.5 m2  ------------------------------------------------------------------------  PROCEDURE: Transthoracic echocardiogram with 2-D, M-Mode  and complete spectral and color flow Doppler. Verbal  consent was obtained for injection of  Ultrasonic Enhancing  Agent following a discussion of risks and benefits.  Following intravenous injection of Ultrasonic Enhancing  Agent , harmonic imaging was performed.  INDICATION: Chest pain, unspecified (R07.9)  ------------------------------------------------------------------------  Dimensions:    Normal Values:  LA:     4.4    2.0 - 4.0 cm  Ao:     4.0    2.0 - 3.8 cm  SEPTUM: 1.1    0.6 - 1.2 cm  PWT:    0.9    0.6 - 1.1 cm  LVIDd:  5.0    3.0 - 5.6 cm  LVIDs:  3.5    1.8 - 4.0 cm  Derived variables:  LVMI: 73 g/m2  RWT: 0.36  EF (Visual Estimate): 60 %  Doppler Peak Velocity (m/sec): AoV=1.5 TV=2.2  ------------------------------------------------------------------------  Observations:  Mitral Valve: Normal mitral valve.  Aortic Valve/Aorta: Calcified aortic valve with normal  opening. Minimal aortic regurgitation.  Normal aortic root size.  Left Atrium: Mildly dilated left atrium.  Left Ventricle: Endocardial visualization enhanced with  intravenous injection of Ultrasonic Enhancing Agent  (Definity).  Normal left ventricular internal dimensions and wall  thickness.  Normal left ventricular systolic function. No segmental  wall motion abnormalities.  Right Heart: Normal right atrium. Normal right ventricular  size and function.  Normal tricuspid valve. Mild tricuspid regurgitation.  Normal pulmonic valve. Mild pulmonic regurgitation.  Pericardium/Pleura: Normal pericardium with no pericardial  effusion.  Hemodynamic: No evidence of pulmonary hypertension.  ------------------------------------------------------------------------  Conclusions:  Endocardial visualization enhanced with intravenous  injection of Ultrasonic Enhancing Agent (Definity).  Normal left ventricular systolic function. No segmental  wall motion abnormalities.  ------------------------------------------------------------------------  Confirmed on  2/22/2022 - 16:17:39 by Ishan Ackerman MD, FASE  ------------------------------------------------------------------------    < from: CT Angio Head w/ IV Cont (05.12.22 @ 13:34) >    ACC: [id number] EXAM:  CT ANGIO NECK (W)[facility]                        ACC: [id number] EXAM:  CT ANGIO BRAIN (W)[facility]                          PROCEDURE DATE:  05/12/2022          INTERPRETATION:  CT angiography of the brain and neck    CLINICAL INDICATION: Recent infarcts. Carotid stenosis.    TECHNIQUE: Direct axial CT scanning of the brain and neck was obtained   from the vertex to the level of the clavicular heads after the dynamic   intravenous injection of 44 cc of Omnipaque 300. 0 cc were discarded.   Sagittal and coronal maximum intensity projection reformats were   provided.  Three-dimensional reconstructions were performed by the   radiologist using the [email or web address] workstation.    Additionally, noncontrast axial CT scanning of the brain was obtained   from the skull base to the vertex. Sagittal and coronal reformats were   provided.    COMPARISON: MRA neck 10/24/2021    FINDINGS:    CT brain:    Previously seen acute infarcts in the bilateral cerebral hemispheres are   redemonstrated. No CT evidence for hemorrhagic transformation.    No hydrocephalus, midline shift, or effacement of basal cisterns.    No acute intracranial hemorrhage or vasogenic edema.    Mild to moderate white matter microvascular ischemic disease.    CTA brain:    Limited by relatively decreased opacification of the arteries.    Multifocal narrowing of the right skull base ICA due to calcified plaque,   the degree of which is not well evaluated.    Normal flow-related enhancement of the supraclinoid right ICA.    Lack of flow related enhancement of the petrous, precavernous, and   cavernous left ICA. Reconstitution of the vessel at its supraclinoid   segment.    Otherwise no flow-limiting stenosis.    Normal flow-related enhancement of the bilateral anterior, middle, and   posterior cerebral arteries.    Normal flow-related enhancement of the intradural vertebral arteries and   basilar artery.    No vascular aneurysm or AVM.    CTA neck:    Stenoses described in this report are on the basis of NASCET criteria.    Study is significantly limited by relatively decreased opacification of   the arteries.    Short segment stenosis of the mid left common carotid artery due to the   presence of partially calcified plaque is poorly evaluated. Flow-related   enhancement of the more proximal and distal left common carotid artery is   noted.    Long segment stenosis of the mid and distal right common carotid artery   due to the presence of partially calcified plaque is poorly evaluated.    Rapidly progressive stenosis of the left internal carotid artery   beginning at its origin. No flow related enhancement of the vessel beyond   its origin.    Normal flow-related enhancement of the bilateral vertebral arteries.    No gross evidence for acute arterial dissection.    IMPRESSION:    CTA brain:  Limited by relatively decreased opacification of the arteries.    Multifocal narrowing of the right skull base ICA due to calcified plaque,   the degree of which is not well evaluated.    Lack of flow related enhancement of the petrous, precavernous, and   cavernous left ICA. Reconstitution of the vessel at its supraclinoid   segment.    Otherwise no flow-limiting stenosis.    No vascular aneurysm or AVM.    CTA neck:  Limited by relatively decreased opacification of the arteries.    Short segment stenosis of the mid left common carotid artery due to the   presence of partially calcified plaque is poorly evaluated. Flow-related   enhancement of the more proximal and distal left common carotid artery is   noted.    Long segment stenosis of the mid and distal right common carotid artery   due to the presence of partially calcified plaque is poorly evaluated.    Rapidly progressive stenosis of the left internal carotid artery   beginning at its origin. No flow related enhancement of the vessel beyond   its origin.    Recommend correlation with contrast-enhanced MRI of the neck for further   evaluation.    --- End of Report ---            MIGUEL ANGEL CARRILLO MD; Attending Radiologist  This document has been electronically signed. May 12 2022  2:47PM    < end of copied text >

## 2022-05-19 NOTE — CHART NOTE - NSCHARTNOTEFT_GEN_A_CORE
Notified by RN regarding pt's FS glucose @2340 to be at 401. Pt was visited by family member and unclear if he was given something to eat before FS check. Plan was to follow sliding scale and give pt 4 units and recheck FS glucose in 2 hrs.    On recheck @ 0200, FS is at 209 which is usually pt's baseline for sugar reads. NTD.    Fernanda Granados PA-C  Medicine

## 2022-05-19 NOTE — PROGRESS NOTE ADULT - ASSESSMENT
·  Problem:[email or web address] hemodialysis per renal  to cont as per renal  f/u labs      Problem/Plan - 2:  ·  Problem: Chronic atrial fibrillation. c/w a/c  cards f/u     Problem/Plan - 3:  ·  Problem: Cystitis.   ID follow up appreciated        Problem/Plan - 4:  ·  Problem: Type 2 diabetes mellitus. c/w insulin   endo f/u      Problem/Plan - 5:  ·  Problem: CAD (coronary artery disease).   rapid a fib  meds adjusted  hr stable  c/w a/c       Persistent encephalopathy   neuro follow up appreciated  f/u labs   wax and wain from extended hospital stay     carotid duplex noted  cta noted  mra/ mri noted  tx plans as per vasc / neuro   vasc to f/u  no plans for intervention now      rehab

## 2022-05-19 NOTE — PROGRESS NOTE ADULT - SUBJECTIVE AND OBJECTIVE BOX
[facility]   [phone]  RENAL FOLLOW UP NOTE  --------------------------------------------------------------------------------  Chief Complaint:    24 hour events/subjective:    Patient seen and examined on HD  lethargic  denies dizziness     PAST HISTORY  --------------------------------------------------------------------------------  No significant changes to PMH, PSH, FHx, SHx, unless otherwise noted    ALLERGIES & MEDICATIONS  --------------------------------------------------------------------------------  Allergies    nitrofurantoin (Nephrotoxicity)    Intolerances      Standing Inpatient Medications  allopurinol 100 milliGRAM(s) Oral daily  apixaban 5 milliGRAM(s) Oral two times a day  aspirin enteric coated 81 milliGRAM(s) Oral daily  atorvastatin 40 milliGRAM(s) Oral at bedtime  chlorhexidine 4% Liquid 1 Application(s) Topical <User Schedule>  dextrose 50% Injectable 25 Gram(s) IV Push once  dextrose 50% Injectable 25 Gram(s) IV Push once  dextrose 50% Injectable 12.5 Gram(s) IV Push once  diltiazem    milliGRAM(s) Oral daily  epoetin naz-epbx (RETACRIT) Injectable 56811 Unit(s) IV Push once  insulin lispro (ADMELOG) corrective regimen sliding scale   SubCutaneous three times a day before meals  insulin lispro (ADMELOG) corrective regimen sliding scale   SubCutaneous at bedtime  iron sucrose Injectable 100 milliGRAM(s) IV Push once  lidocaine   4% Patch 1 Patch Transdermal daily  losartan 25 milliGRAM(s) Oral daily  metoprolol succinate  milliGRAM(s) Oral daily  mupirocin 2% Ointment 1 Application(s) Both Nostrils two times a day  polyethylene glycol 3350 17 Gram(s) Oral two times a day  povidone iodine 10% Solution 1 Application(s) Topical daily  QUEtiapine 25 milliGRAM(s) Oral at bedtime  senna 2 Tablet(s) Oral at bedtime  sevelamer carbonate Powder 1600 milliGRAM(s) Oral three times a day with meals  sodium chloride 0.9%. 250 milliLiter(s) IV Continuous <Continuous>    PRN Inpatient Medications  bisacodyl 5 milliGRAM(s) Oral every 12 hours PRN  dextrose Oral Gel 15 Gram(s) Oral once PRN  sodium chloride 0.9% lock flush 10 milliLiter(s) IV Push every 1 hour PRN      REVIEW OF SYSTEMS  --------------------------------------------------------------------------------    Resp: denies SOB/Cough  : Denies dysuria    VITALS/PHYSICAL EXAM  --------------------------------------------------------------------------------  T(C): 36.4 (05-19-22 @ 12:45), Max: 36.6 (05-18-22 @ 20:40)  HR: 70 (05-19-22 @ 12:45) (70 - 98)  BP: 145/103 (05-19-22 @ 12:45) (145/103 - 176/70)  RR: 18 (05-19-22 @ 12:45) (18 - 18)  SpO2: 96% (05-19-22 @ 12:45) (94% - 96%)  Wt(kg): --        05-18-22 @ 07:01  -  05-19-22 @ 07:00  --------------------------------------------------------  IN: 100 mL / OUT: 0 mL / NET: 100 mL      Physical Exam:  	              Gen: appears comfortable more communicative   	Pulm: Decreased breath sounds b/l bases.  	CV: No JVD. IRR,   	Abd: +BS, soft, nontender, softly distended, obese               Extremity no edema              Extremity LE: + hyperpigmented bilateral LE with no edema              Vascular: R IJ HD catheter, L arm AVF     LABS/STUDIES  --------------------------------------------------------------------------------                Creatinine Trend:  SCr 7.34 [05-17 @ 05:54]  SCr 5.98 [05-16 @ 06:28]  SCr 4.22 [05-15 @ 07:37]  SCr 7.34 [05-12 @ 17:15]  SCr 8.68 [05-10 @ 15:00]                  Iron 21, TIBC 245, %sat 9      [05-17-22 @ 05:54]  Ferritin 120      [05-17-22 @ 05:54]  PTH -- (Ca 8.3)      [04-15-22 @ 12:18]   150  PTH -- (Ca --)      [04-03-22 @ 23:06]   97  HbA1c 11.7      [11-07-19 @ 09:14]  TSH 1.71      [05-12-22 @ 07:11]

## 2022-05-19 NOTE — PROGRESS NOTE ADULT - ASSESSMENT
67 year old gentleman with PMH of CAD, NSTEMI s/p 3 stents in 2014 (stents to LAD, proximal and distal LCx), ischemic cardiomyopathy, HTN for 10 years, T2DM for 26 years c/b peripheral neuropathy, CVA, TIA, Afib on Pradaxa, chronic RLE wound, L carotid stenosis s/p endarterectomy (2014) just recently admitted  to the [facility] on 2/19/2022 with acute onset chest pain for one day found to have an NSTEMI, CAD, s/p PCI in setting of increased AF rates off bb for 3 days and resolved chest pain, his CKMB peaked and Echo noted with EF 60%,normal LV function, mild TR, mild [place]. He was s/p [facility] with 85% proximal LAD s/p balloon angioplasty and LULU. He presented to [facility] ED with decreased urine output over the week. [facility]. Stevens went to [facility] for hematuria and was started  on Nitrofurantoin. Pt is still taking Nitrofurantoin w/ 5 days left. He came to the ED due to worsening symptoms. Pt reports having a similar incident 4-5 years ago that resolved spontaneously. He reports increased leg edema Dyspnea worse on exertion. his baseline Serum creatinine is in the 2.0 to 2.3 mg/dl range. ANT with serum creatinine of 8.29 with bun 144 and k 5.5      1- ANT on ckd III ---> ESRD likely   2- chf chronic   3- hyperphosphatemia /shpt   4- anemia   5- hyperlipidemia   6- HTN /a fib  7- TIA hx   8- hx of carotid stenosis      HD today  F200, 225 min, , , 0.5L fluid removal  see HD flowsheet  renvela 2 tab with meals   off hydralazine due to orthostatic hypotension  anemia - epogen 07607 Units TIW with HD.     iron deficiency; Venofer 100 mg IVP with HD  PT  seroquel 25 mg daily  vascular follow up outpatient in 4-6 weeks

## 2022-05-19 NOTE — PROGRESS NOTE ADULT - ASSESSMENT
A/P    66 y/o M with history of CAD, s/p multiple PCI, with most recent 2/22 PCI of LAD in setting of NSTEMI, on ASA only (pradaxa), chronic atrial fibrillation maintained on Pradaxa, essential HTN, type 2 DM previously on oral medications but on insulin, diabetic neuropathy with chronic RIGHT LE venous stasis changes>left, LEFT foot ulcer seen by podiatry, past endarterectomy LEFT CEA in 2014 presenting with 1 week of decreased urine output, cystitis with hematuria     #ANT on CKD, new HD  -oliguric renal failure in setting of UTI/cystitis  -New HD for uremia, renal failure  -sp rrt 4/12 for uncontrolled bleeding sp  right groin shiley removal  - monitor h/h - stable  -s/p L AVG 4/18  -s/p permacath placement 4/20  -renal f/ u    #AMS/Lethargy  -recent ams from pain meds  -AMS sp RRT 4/27  likely secondary to pain med   -repeat CT 4/27 unremarkable --  discontinued Percocet  -MRI 5/11  revealed small acute infarctions in bilateral posterior centrum semiovale and left corona radiata and left posterior periventricular white matter  -on asa statin  -repeat echo with no interval changes  -CTa head neck noted; neuro following     #Acute on chronic HFpEF  -stable  -continue volume removal with HD  -c/w bb  -repeat echo with stable borderline lv fxn    #Chronic AF  -rates stable   -c/w metoprolol succ 100 mg qd  -c/w dilt cd 120mg daily    -brief pause noted- likely secondary to MOR  -c/w eliquis 5 mg BID for now - heme stable     #HTN  -stable  -c/w meds       #CAD, s/p PCI, most recent 2/2022  -stable, cont asa  -off plavix due to a/c indication  -statin     #carotid stenosis   -previous MRA  noted with Greater than 75% stenosis of the right distal common carotid artery. Approximately 60% stenosis of the proximal left internal carotid artery.  -carotid dopplers 5/11 noted :  There is new occlusion of the left internal carotid artery;  greater than 70% stenosis involving the distal right common carotid artery as was seen previously. Mild to moderate flow-limiting stenosis is seen at the left external   carotid artery.  -CTa head and neck 5/12 noted  -Continue ASA and Statin Therapy  -neuro fu  / work up / imaging per vascular   -mra head/ neck noted- per vascular outpt follow up for repeat to re eval w MRI / mra of the brain neck       DCP

## 2022-05-19 NOTE — PROGRESS NOTE ADULT - SUBJECTIVE AND OBJECTIVE BOX
DATE OF SERVICE: 05-19-22 @ 13:08  CHIEF COMPLAINT:Patient is a 67y old  Male who presents with a chief complaint of Decreased urine output for the past week, leg oedema (19 May 2022 12:39)    	        PAST MEDICAL & SURGICAL HISTORY:  HTN (hypertension), benign      HLD (hyperlipidemia)      DM type 2 (diabetes mellitus, type 2)      TIA (transient ischemic attack)      Atrial fibrillation      MI (myocardial infarction)  circa 2014 and 2022.      CAD (coronary artery disease)      Neuropathy      Stage 3 chronic kidney disease      2019 novel coronavirus disease (COVID-19)  12/2021.  Received the COVID-19 vaccine x 2 as of April 2022.      Status post angioplasty with stent  LULU x 3 2/7/2014      S/P carotid endarterectomy  left              REVIEW OF SYSTEMS:  seems more letharig but arousable   RESPIRATORY: No cough, wheezing, chills or hemoptysis; No Shortness of Breath  CARDIOVASCULAR: No chest pain, palpitations, passing out, dizziness, leg swelling  GASTROINTESTINAL: No abdominal or epigastric pain. No nausea, vomiting, or hematemesis;   GENITOURINARY: No dysuria, frequency, hematuria,   NEUROLOGICAL: No headaches,   Medications:  MEDICATIONS  (STANDING):  allopurinol 100 milliGRAM(s) Oral daily  apixaban 5 milliGRAM(s) Oral two times a day  aspirin enteric coated 81 milliGRAM(s) Oral daily  atorvastatin 40 milliGRAM(s) Oral at bedtime  chlorhexidine 4% Liquid 1 Application(s) Topical <User Schedule>  dextrose 50% Injectable 25 Gram(s) IV Push once  dextrose 50% Injectable 25 Gram(s) IV Push once  dextrose 50% Injectable 12.5 Gram(s) IV Push once  diltiazem    milliGRAM(s) Oral daily  epoetin naz-epbx (RETACRIT) Injectable 90100 Unit(s) IV Push once  insulin lispro (ADMELOG) corrective regimen sliding scale   SubCutaneous three times a day before meals  insulin lispro (ADMELOG) corrective regimen sliding scale   SubCutaneous at bedtime  iron sucrose Injectable 100 milliGRAM(s) IV Push once  lidocaine   4% Patch 1 Patch Transdermal daily  losartan 25 milliGRAM(s) Oral daily  metoprolol succinate  milliGRAM(s) Oral daily  mupirocin 2% Ointment 1 Application(s) Both Nostrils two times a day  polyethylene glycol 3350 17 Gram(s) Oral two times a day  povidone iodine 10% Solution 1 Application(s) Topical daily  QUEtiapine 25 milliGRAM(s) Oral at bedtime  senna 2 Tablet(s) Oral at bedtime  sevelamer carbonate Powder 1600 milliGRAM(s) Oral three times a day with meals  sodium chloride 0.9%. 250 milliLiter(s) (250 mL/Hr) IV Continuous <Continuous>    MEDICATIONS  (PRN):  bisacodyl 5 milliGRAM(s) Oral every 12 hours PRN Constipation  dextrose Oral Gel 15 Gram(s) Oral once PRN Blood Glucose LESS THAN 70 milliGRAM(s)/deciliter  sodium chloride 0.9% lock flush 10 milliLiter(s) IV Push every 1 hour PRN Pre/post blood products, medications, blood draw, and to maintain line patency    	    PHYSICAL EXAM:  T(C): 36.4 (05-19-22 @ 12:45), Max: 36.6 (05-18-22 @ 20:40)  HR: 70 (05-19-22 @ 12:45) (70 - 98)  BP: 145/103 (05-19-22 @ 12:45) (128/68 - 176/70)  RR: 18 (05-19-22 @ 12:45) (18 - 18)  SpO2: 96% (05-19-22 @ 12:45) (94% - 96%)  Wt(kg): --  I&O's Summary    18 May 2022 07:01  -  19 May 2022 07:00  --------------------------------------------------------  IN: 100 mL / OUT: 0 mL / NET: 100 mL          Cardiovascular: Normal S1 S2, No JVD  Respiratory: Lungs clear to auscultation	  Psychiatry: A & O  Gastrointestinal:  Soft, Non-tender, + BS	    Neurologic: Non-focal  Extremities: dec rom     TELEMETRY: 	    ECG:  	  RADIOLOGY:  OTHER: 	  	  LABS:	 	    CARDIAC MARKERS:                  proBNP:   Lipid Profile:   HgA1c:   TSH:

## 2022-05-19 NOTE — PROGRESS NOTE ADULT - ASSESSMENT
Impression:  This is a 67 year old gentleman pmhx CAD s/p MI/PCI/CABG, hx TIA, afib on rivaroxaban, HTN, DM2, PVD, gout, L CEA 2014, and CKD who presented to [facility] 4/11/22 with ANT, tremors, confusion and lethargy.  CT head no acute changes.  S/p initiation of hemodialysis.  Mental status has improved dramatically.  Pt denies any headaches, no slurred speech, no hemiparesis.  He has chronic gout with bilateral finger swelling, pain.  He also has chronic neuropathy.  Both legs are weak.      Persistent encephalopathy prompted re-consultation on 5/10/22.  MRI brain was ordered and revealed small acute infarctions in bilateral posterior centrum semiovale and left corona radiata and left posterior periventricular white matter.  Carotid ultrasound was performed showing new occlusion of the left internal carotid artery, along with greater than 70% stenosis involving the distal right common carotid artery, and mild to moderate flow-limiting stenosis is seen at the left external carotid artery.  Vascular surgery has been consulted and is recommending CTA.  He continues to be encephalopathic and lethargic.      Diagnosis and Recommendations:  Small scattered infarcts  - Infarcts may be contributing to but are likely not main force driving encephalopathy.  They are likely reflective of fact that brain is being perfused by right CCA/ICA that in itself shows severe stenosis.    - MRA was suboptimal not visualizing the neck but MRA head showed left KESHAWN coming from ACOM and left PCA feeding left MCA.  ICA showed no flow.    - in setting of atrial fibrillation infarcts may well be cardioembolic, continue anticoagulation as per cardiology  - s/p carotid doppler, suboptimal CTA/MRA.  However, it is apparent that there is a proximal left ICA occlusion, which would not be a candidate for intervention.  Intervention for right CCA/ICA stenosis would be high risk but it may indeed by a symptomatic stenosis.   - ECHO in February 2022 did not reveal severe cardiomyopathy, vegetation, or LV thrombus.    - HgA1c of 10.1 in February 2022 may be driving stroke risk.  Goal is < 7  - continue high-intensity statin therapy  -appreciate [facility] notes, no further inpt workup for these vascular issues in light of overall medical issues.  To be reassessed as outpt as per vascular    Delirium  - blinds open   - frequent re-orientation  - minimize night-time awakening  - optimize sleep/wake cycle  - toxic/metabolic/infectious workup and management as per primary team  - minimize polypharmacy as able  - minimize psychotropic medications as able

## 2022-05-19 NOTE — PROGRESS NOTE ADULT - SUBJECTIVE AND OBJECTIVE BOX
Follow-up Pulm Progress Note    more tired/confused today  denies SOB     Medications:  MEDICATIONS  (STANDING):  allopurinol 100 milliGRAM(s) Oral daily  apixaban 5 milliGRAM(s) Oral two times a day  aspirin enteric coated 81 milliGRAM(s) Oral daily  atorvastatin 40 milliGRAM(s) Oral at bedtime  chlorhexidine 4% Liquid 1 Application(s) Topical <User Schedule>  dextrose 50% Injectable 25 Gram(s) IV Push once  dextrose 50% Injectable 25 Gram(s) IV Push once  dextrose 50% Injectable 12.5 Gram(s) IV Push once  diltiazem    milliGRAM(s) Oral daily  epoetin naz-epbx (RETACRIT) Injectable 63000 Unit(s) IV Push once  insulin lispro (ADMELOG) corrective regimen sliding scale   SubCutaneous three times a day before meals  insulin lispro (ADMELOG) corrective regimen sliding scale   SubCutaneous at bedtime  iron sucrose Injectable 100 milliGRAM(s) IV Push once  lidocaine   4% Patch 1 Patch Transdermal daily  losartan 25 milliGRAM(s) Oral daily  metoprolol succinate  milliGRAM(s) Oral daily  mupirocin 2% Ointment 1 Application(s) Both Nostrils two times a day  polyethylene glycol 3350 17 Gram(s) Oral two times a day  povidone iodine 10% Solution 1 Application(s) Topical daily  QUEtiapine 25 milliGRAM(s) Oral at bedtime  senna 2 Tablet(s) Oral at bedtime  sevelamer carbonate Powder 1600 milliGRAM(s) Oral three times a day with meals  sodium chloride 0.9%. 250 milliLiter(s) (250 mL/Hr) IV Continuous <Continuous>    MEDICATIONS  (PRN):  bisacodyl 5 milliGRAM(s) Oral every 12 hours PRN Constipation  dextrose Oral Gel 15 Gram(s) Oral once PRN Blood Glucose LESS THAN 70 milliGRAM(s)/deciliter  sodium chloride 0.9% lock flush 10 milliLiter(s) IV Push every 1 hour PRN Pre/post blood products, medications, blood draw, and to maintain line patency          Vital Signs Last 24 Hrs  T(C): 36.4 (19 May 2022 04:45), Max: 36.6 (18 May 2022 20:40)  T(F): 97.6 (19 May 2022 04:45), Max: 97.9 (18 May 2022 20:40)  HR: 75 (19 May 2022 04:45) (75 - 98)  BP: 161/70 (19 May 2022 04:45) (128/62 - 176/70)  BP(mean): --  RR: 18 (19 May 2022 04:45) (18 - 18)  SpO2: 96% (19 May 2022 04:45) (94% - 99%)          05-18 @ 07:01  -  05-19 @ 07:00  --------------------------------------------------------  IN: 100 mL / OUT: 0 mL / NET: 100 mL          LABS:                CAPILLARY BLOOD GLUCOSE      POCT Blood Glucose.: 181 mg/dL (19 May 2022 11:33)              Physical Examination:  PULM: Clear to auscultation bilaterally, no significant sputum production  CVS: S1, S2 heard    RADIOLOGY REVIEWED  CXR 5/12: small L effusion    CT chest: < from: CT Chest No Cont (04.04.22 @ 16:52) >  FINDINGS:    AIRWAYS, LUNGS, PLEURA: Tracheal secretions. Small left greater than   right pleural effusions increased compared to 2/19/2022. Right-sided   pleural thickening. Lingular and left greater than right basilar   opacification, likely atelectasis.    MEDIASTINUM: Cardiomegaly. No pericardial effusion. Thoracic aorta normal   caliber.  No large mediastinal lymph nodes.    IMAGED ABDOMEN: Nonspecific perinephric stranding.    SOFT TISSUES: Unremarkable.    BONES: Unremarkable.    IMPRESSION:.    Small left greater than right bilateral pleural effusions increased in   size compared to 2/19/2022.    Lingular and left greater than right basilar opacification, likely   atelectasis.    --- End of Report ---    < end of copied text >

## 2022-05-19 NOTE — PROGRESS NOTE ADULT - NS ATTEND AMEND GEN_ALL_CORE FT
seen on h d  had hypotension   heart RRR   lungs decrease bs no rales  ext no edema    ESRD  HD as planned above   decreased fluid removal to 500 cc  see hd flow sheet

## 2022-05-19 NOTE — PROGRESS NOTE ADULT - SUBJECTIVE AND OBJECTIVE BOX
CARDIOLOGY FOLLOW UP - Dr. Mazariegos  DATE OF SERVICE: 5/19/22     CC no cp or sob       REVIEW OF SYSTEMS:  CONSTITUTIONAL: No fever, weight loss, or fatigue  RESPIRATORY: No cough, wheezing, chills or hemoptysis; No Shortness of Breath  CARDIOVASCULAR: No chest pain, palpitations, passing out, dizziness, or leg swelling  GASTROINTESTINAL: No abdominal or epigastric pain. No nausea, vomiting, or hematemesis; No diarrhea or constipation. No melena or hematochezia.  VASCULAR: No edema     PHYSICAL EXAM:  T(C): 36.4 (05-19-22 @ 04:45), Max: 36.6 (05-18-22 @ 20:40)  HR: 75 (05-19-22 @ 04:45) (75 - 98)  BP: 161/70 (05-19-22 @ 04:45) (128/62 - 176/70)  RR: 18 (05-19-22 @ 04:45) (17 - 18)  SpO2: 96% (05-19-22 @ 04:45) (94% - 99%)  Wt(kg): --  I&O's Summary    18 May 2022 07:01  -  19 May 2022 07:00  --------------------------------------------------------  IN: 100 mL / OUT: 0 mL / NET: 100 mL        Appearance: Normal	  Cardiovascular: Normal S1 S2,RRR, No JVD, No murmurs  Respiratory: Lungs clear to auscultation	  Gastrointestinal:  Soft, Non-tender, + BS	  Extremities: Normal range of motion, No clubbing, cyanosis or edema      Home Medications:  allopurinol 100 mg oral tablet: 1 tab(s) orally once a day (03 Apr 2022 06:34)  aspirin 81 mg oral delayed release tablet: 1 tab(s) orally once a day (03 Apr 2022 06:34)  bumetanide 2 mg oral tablet: 1 tab(s) orally once a day (03 Apr 2022 06:34)  insulin glargine 100 units/mL subcutaneous solution: 25 unit(s) subcutaneous once a day (at bedtime) (03 Apr 2022 06:34)  metOLazone 5 mg oral tablet: 1 tab(s) orally 3 times a week (03 Apr 2022 06:34)  metoprolol succinate 50 mg oral tablet, extended release: 2 tab(s) orally once a day (03 Apr 2022 06:34)  NovoLOG 100 units/mL subcutaneous solution: subcutaneous 4 times a day (before meals and at bedtime) (03 Apr 2022 06:34)  NovoLOG FlexPen 100 units/mL injectable solution: 10 unit(s) subcutaneous 3 times a day (03 Apr 2022 06:34)  oxycodone-acetaminophen 5 mg-325 mg oral tablet: 1 tab(s) orally 2 times a day (03 Apr 2022 06:34)  Pradaxa 150 mg oral capsule: 1 cap(s) orally 2 times a day (03 Apr 2022 06:34)  pregabalin 100 mg oral capsule: 1 cap(s) orally 3 times a day (03 Apr 2022 06:34)      MEDICATIONS  (STANDING):  allopurinol 100 milliGRAM(s) Oral daily  apixaban 5 milliGRAM(s) Oral two times a day  aspirin enteric coated 81 milliGRAM(s) Oral daily  atorvastatin 40 milliGRAM(s) Oral at bedtime  chlorhexidine 4% Liquid 1 Application(s) Topical <User Schedule>  dextrose 50% Injectable 25 Gram(s) IV Push once  dextrose 50% Injectable 12.5 Gram(s) IV Push once  dextrose 50% Injectable 25 Gram(s) IV Push once  diltiazem    milliGRAM(s) Oral daily  insulin lispro (ADMELOG) corrective regimen sliding scale   SubCutaneous at bedtime  insulin lispro (ADMELOG) corrective regimen sliding scale   SubCutaneous three times a day before meals  lidocaine   4% Patch 1 Patch Transdermal daily  losartan 25 milliGRAM(s) Oral daily  metoprolol succinate  milliGRAM(s) Oral daily  mupirocin 2% Ointment 1 Application(s) Both Nostrils two times a day  polyethylene glycol 3350 17 Gram(s) Oral two times a day  povidone iodine 10% Solution 1 Application(s) Topical daily  QUEtiapine 25 milliGRAM(s) Oral at bedtime  senna 2 Tablet(s) Oral at bedtime  sevelamer carbonate Powder 1600 milliGRAM(s) Oral three times a day with meals  sodium chloride 0.9%. 250 milliLiter(s) (250 mL/Hr) IV Continuous <Continuous>      TELEMETRY: 	    ECG:  	  RADIOLOGY:   DIAGNOSTIC TESTING:  [ ] Echocardiogram:  [ ]  Catheterization:  [ ] Stress Test:    OTHER: 	    LABS:

## 2022-05-20 NOTE — PROGRESS NOTE ADULT - ASSESSMENT
67 year old gentleman with PMH of CAD, NSTEMI s/p 3 stents in 2014 (stents to LAD, proximal and distal LCx), ischemic cardiomyopathy, HTN for 10 years, T2DM for 26 years c/b peripheral neuropathy, CVA, TIA, Afib on Pradaxa, chronic RLE wound, L carotid stenosis s/p endarterectomy (2014) just recently admitted  to the [facility] on 2/19/2022 with acute onset chest pain for one day found to have an NSTEMI, CAD, s/p PCI in setting of increased AF rates off bb for 3 days and resolved chest pain, his CKMB peaked and Echo noted with EF 60%,normal LV function, mild TR, mild AL. He was s/p [facility] with 85% proximal LAD s/p balloon angioplasty and LULU. He presented to [facility] ED with decreased urine output over the week. [facility] and was started  on Nitrofurantoin. Pt is still taking Nitrofurantoin w/ 5 days left. He came to the ED due to worsening symptoms. Pt reports having a similar incident 4-5 years ago that resolved spontaneously. He reports increased leg edema Dyspnea worse on exertion. his baseline Serum creatinine is in the 2.0 to 2.3 mg/dl range. ANT with serum creatinine of 8.29 with bun 144 and k 5.5      1- ANT on ckd III ---> ESRD likely   2- chf chronic   3- hyperphosphatemia /shpt   4- anemia   5- hyperlipidemia   6- HTN /a fib  7- TIA hx   8- hx of carotid stenosis      no HD today  phos 3.1, hold renvela  trend phos   off hydralazine due to orthostatic hypotension  anemia - epogen 16405 Units TIW with HD.     iron deficiency; Venofer 100 mg IVP with HD  tachycardic when seen, received cardizem and metoprolol this am  check temperature now, d/w RN  PT  seroquel 25 mg daily  vascular follow up outpatient in 4-6 weeks 67 year old gentleman with PMH of CAD, NSTEMI s/p 3 stents in 2014 (stents to LAD, proximal and distal LCx), ischemic cardiomyopathy, HTN for 10 years, T2DM for 26 years c/b peripheral neuropathy, CVA, TIA, Afib on Pradaxa, chronic RLE wound, L carotid stenosis s/p endarterectomy (2014) just recently admitted  to the [facility] on 2/19/2022 with acute onset chest pain for one day found to have an NSTEMI, CAD, s/p PCI in setting of increased AF rates off bb for 3 days and resolved chest pain, his CKMB peaked and Echo noted with EF 60%,normal LV function, mild TR, mild MT. He was s/p [facility] with 85% proximal LAD s/p balloon angioplasty and LULU. He presented to [facility] ED with decreased urine output over the week. [facility] and was started  on Nitrofurantoin. Pt is still taking Nitrofurantoin w/ 5 days left. He came to the ED due to worsening symptoms. Pt reports having a similar incident 4-5 years ago that resolved spontaneously. He reports increased leg edema Dyspnea worse on exertion. his baseline Serum creatinine is in the 2.0 to 2.3 mg/dl range. ANT with serum creatinine of 8.29 with bun 144 and k 5.5      1- ANT on ckd III ---> ESRD likely   2- chf chronic   3- hyperphosphatemia /shpt   4- anemia   5- hyperlipidemia   6- HTN /a fib  7- TIA hx   8- hx of carotid stenosis      no HD today  phos 3.1, hold renvela  trend phos   off hydralazine due to orthostatic hypotension  anemia - epogen 71848 Units TIW with HD.     iron deficiency; Venofer 100 mg IVP with HD  tachycardic when seen, received cardizem and metoprolol this am  afebrile  to have NS @ 50 cc/hr x 24 hours  PT  seroquel 25 mg daily  vascular follow up outpatient in 4-6 weeks 67 year old gentleman with PMH of CAD, NSTEMI s/p 3 stents in 2014 (stents to LAD, proximal and distal LCx), ischemic cardiomyopathy, HTN for 10 years, T2DM for 26 years c/b peripheral neuropathy, CVA, TIA, Afib on Pradaxa, chronic RLE wound, L carotid stenosis s/p endarterectomy (2014) just recently admitted  to the [facility] on 2/19/2022 with acute onset chest pain for one day found to have an NSTEMI, CAD, s/p PCI in setting of increased AF rates off bb for 3 days and resolved chest pain, his CKMB peaked and Echo noted with EF 60%,normal LV function, mild TR, mild MI. He was s/p [facility] with 85% proximal LAD s/p balloon angioplasty and LULU. He presented to [facility] ED with decreased urine output over the week. [facility] and was started  on Nitrofurantoin. Pt is still taking Nitrofurantoin w/ 5 days left. He came to the ED due to worsening symptoms. Pt reports having a similar incident 4-5 years ago that resolved spontaneously. He reports increased leg edema Dyspnea worse on exertion. his baseline Serum creatinine is in the 2.0 to 2.3 mg/dl range. ANT with serum creatinine of 8.29 with bun 144 and k 5.5      1- ANT on ckd III ---> ESRD likely   2- chf chronic   3- hyperphosphatemia /shpt   4- anemia   5- hyperlipidemia   6- HTN /a fib  7- TIA hx   8- hx of carotid stenosis      no HD today  phos 3.1, hold renvela  trend phos   off hydralazine due to orthostatic hypotension  anemia - epogen 63142 Units TIW with HD.     iron deficiency; Venofer 100 mg IVP with HD  tachycardic when seen, received cardizem and metoprolol this am  afebrile  to have NS @ 50 cc/hr x 24 hours  PT  d/c seroquel  vascular follow up outpatient in 4-6 weeks

## 2022-05-20 NOTE — PROGRESS NOTE ADULT - SUBJECTIVE AND OBJECTIVE BOX
CARDIOLOGY FOLLOW UP - Dr. Mazariegos  DATE OF SERVICE: 5/20/22     CC no cp or sob   denies palps       REVIEW OF SYSTEMS:  CONSTITUTIONAL: No fever, weight loss, or fatigue  RESPIRATORY: No cough, wheezing, chills or hemoptysis; No Shortness of Breath  CARDIOVASCULAR: No chest pain, palpitations, passing out, dizziness, or leg swelling  GASTROINTESTINAL: No abdominal or epigastric pain. No nausea, vomiting, or hematemesis; No diarrhea or constipation. No melena or hematochezia.  VASCULAR: No edema     PHYSICAL EXAM:  T(C): 36.6 (05-20-22 @ 08:18), Max: 36.6 (05-19-22 @ 20:58)  HR: 110 (05-20-22 @ 08:18) (70 - 110)  BP: 135/78 (05-20-22 @ 08:18) (111/41 - 160/77)  RR: 18 (05-20-22 @ 08:18) (18 - 18)  SpO2: 95% (05-20-22 @ 08:18) (95% - 96%)  Wt(kg): --  I&O's Summary    19 May 2022 07:01  -  20 May 2022 07:00  --------------------------------------------------------  IN: 1100 mL / OUT: 1500 mL / NET: -400 mL        Appearance: Normal	  Cardiovascular: Normal S1 S2 irreg tahcycardia   Respiratory: Lungs clear to auscultation	  Gastrointestinal:  Soft, Non-tender, + BS	  Extremities: Normal range of motion, No clubbing, cyanosis or edema      Home Medications:  allopurinol 100 mg oral tablet: 1 tab(s) orally once a day (03 Apr 2022 06:34)  aspirin 81 mg oral delayed release tablet: 1 tab(s) orally once a day (03 Apr 2022 06:34)  bumetanide 2 mg oral tablet: 1 tab(s) orally once a day (03 Apr 2022 06:34)  insulin glargine 100 units/mL subcutaneous solution: 25 unit(s) subcutaneous once a day (at bedtime) (03 Apr 2022 06:34)  metOLazone 5 mg oral tablet: 1 tab(s) orally 3 times a week (03 Apr 2022 06:34)  metoprolol succinate 50 mg oral tablet, extended release: 2 tab(s) orally once a day (03 Apr 2022 06:34)  NovoLOG 100 units/mL subcutaneous solution: subcutaneous 4 times a day (before meals and at bedtime) (03 Apr 2022 06:34)  NovoLOG FlexPen 100 units/mL injectable solution: 10 unit(s) subcutaneous 3 times a day (03 Apr 2022 06:34)  oxycodone-acetaminophen 5 mg-325 mg oral tablet: 1 tab(s) orally 2 times a day (03 Apr 2022 06:34)  Pradaxa 150 mg oral capsule: 1 cap(s) orally 2 times a day (03 Apr 2022 06:34)  pregabalin 100 mg oral capsule: 1 cap(s) orally 3 times a day (03 Apr 2022 06:34)      MEDICATIONS  (STANDING):  allopurinol 100 milliGRAM(s) Oral daily  apixaban 5 milliGRAM(s) Oral two times a day  aspirin enteric coated 81 milliGRAM(s) Oral daily  atorvastatin 40 milliGRAM(s) Oral at bedtime  chlorhexidine 4% Liquid 1 Application(s) Topical <User Schedule>  dextrose 50% Injectable 25 Gram(s) IV Push once  dextrose 50% Injectable 25 Gram(s) IV Push once  dextrose 50% Injectable 12.5 Gram(s) IV Push once  diltiazem    milliGRAM(s) Oral daily  insulin lispro (ADMELOG) corrective regimen sliding scale   SubCutaneous three times a day before meals  insulin lispro (ADMELOG) corrective regimen sliding scale   SubCutaneous at bedtime  lidocaine   4% Patch 1 Patch Transdermal daily  metoprolol succinate  milliGRAM(s) Oral daily  mupirocin 2% Ointment 1 Application(s) Both Nostrils two times a day  polyethylene glycol 3350 17 Gram(s) Oral two times a day  povidone iodine 10% Solution 1 Application(s) Topical daily  senna 2 Tablet(s) Oral at bedtime  sodium chloride 0.9%. 1000 milliLiter(s) (50 mL/Hr) IV Continuous <Continuous>  sodium chloride 0.9%. 250 milliLiter(s) (250 mL/Hr) IV Continuous <Continuous>      TELEMETRY: afib hr 145 	    ECG:  	  RADIOLOGY:   DIAGNOSTIC TESTING:  [ ] Echocardiogram:  [ ]  Catheterization:  [ ] Stress Test:    OTHER: 	    LABS:	 	                            8.6    9.42  )-----------( 295      ( 19 May 2022 13:30 )             29.8     05-19    135  |  95<L>  |  34<H>  ----------------------------<  175<H>  4.5   |  26  |  6.79<H>    Ca    9.1      19 May 2022 13:49  Phos  3.1     05-19

## 2022-05-20 NOTE — CHART NOTE - NSCHARTNOTEFT_GEN_A_CORE
Nutrition Follow Up Note  Patient seen for: follow up    Source: [] Patient       [x] Medical Record        [x] RN        [] Family at bedside       [] Other:    -If unable to interview patient: [] Trach/Vent/BiPAP  [x] Disoriented/confused/inappropriate to interview    Per chart: "67 year old gentleman pmhx CAD s/p MI/PCI/CABG, hx TIA, afib on rivaroxaban, HTN, DM2, PVD, gout, L CEA , and CKD who presented to [facility] 22 with ANT, tremors, confusion and lethargy.  CT head no acute changes.  S/p initiation of hemodialysis."     Diet Order:   Diet, Consistent Carbohydrate Renal/No Snacks:   Supplement Feeding Modality:  Oral  Nepro Cans or Servings Per Day:  1       Frequency:  Daily (22)    - Is current order appropriate/adequate? [x] Yes  []  No:     - PO intake :   [] >75%  Adequate    [x] 50-75%  Fair       [] <50%  Poor  ~fair/poor PO intake noted per RN documentation per flow sheets    - Nutrition-related concerns:      -blood glucose remains suboptimally controlled, remains on insulin regimen      -BUN/Cr remain elevated, nephrology following, HD initiated     GI:  Last BM .   Bowel Regimen? [x] Yes senna and miralax  [] No      Weights:   Daily Weight in k.5 (05-14), Daily Weight in k.3 (05-12), Weight in k.3 (05-12), Weight in k (05-10), Weight in k.5 (05-10), Weight in k.1 (-07), Weight in kG 138.8 ().  Weight loss noted since admission, malnutrition ongoing. started on HD, some weight loss may be due to fluid loss. RD will continue to monitor trends.     Nutritionally Pertinent MEDICATIONS  (STANDING):  allopurinol  atorvastatin  dextrose 50% Injectable  dextrose 50% Injectable  dextrose 50% Injectable  insulin lispro (ADMELOG) corrective regimen sliding scale  insulin lispro (ADMELOG) corrective regimen sliding scale  metoprolol succinate ER  polyethylene glycol 3350  senna  sodium chloride 0.9%.  sodium chloride 0.9%.    Pertinent Labs:   A1C with Estimated Average Glucose Result: 10.1 % (22 @ 12:18)    Finger Sticks:  POCT Blood Glucose.: 102 mg/dL ( @ 12:30)  POCT Blood Glucose.: 240 mg/dL ( @ 08:43)  POCT Blood Glucose.: 172 mg/dL ( @ 21:41)  POCT Blood Glucose.: 125 mg/dL ( @ 17:20)      Skin per nursing documentation: No pressure injuries noted.  Edema per nursing documentation: no edema noted.     Estimated Needs:   [x] no change since previous assessment  Estimated  Energy Needs:  78.2kg x 30-35kcal/my=7730-0135wpjb/day  Estimated Protein Needs:78.2 x 1.2-1.4g/kg= 93.8-109.5g/day  Estimated Fluid Needs: deferred to team   IBW (78.2kg) was used for energy and protein calculations.    Previous Nutrition Diagnosis: Increased nutrient needs; Severe acute malnutrition   Nutrition Diagnosis is: [x] ongoing - being addressed with diet and supplement [] resolved [] not applicable     Nutrition Care Plan:  [x] In Progress  [] Achieved  [] Not applicable    New Nutrition Diagnosis: [x] Not applicable    Nutrition Interventions:     Education Provided   [] Yes:  [x] No: pt confused    Recommendations:      -Continue current diet and oral supplement. If PO intake does not increase, consider liberalizing.   -Add nephrovite if no contraindications due to increased needs    Monitoring and Evaluation:   Continue to monitor nutritional intake, tolerance to diet prescription, weights, labs, skin integrity    RD remains available upon request and will follow up per protocol  Flora Lane, MS, RD, CDN

## 2022-05-20 NOTE — PROGRESS NOTE ADULT - SUBJECTIVE AND OBJECTIVE BOX
Admitting Diagnosis:  Chronic kidney disease [N18.9]  CHRONIC KIDNEY DISEASE, UNSPECIFIED    Background:  This is a 67 year old gentleman pmhx CAD s/p MI/PCI/CABG, hx TIA, afib on rivaroxaban, HTN, DM2, PVD, gout, L CEA 2014, and CKD who presented to [facility] 4/11/22 with ANT, tremors, confusion and lethargy.  CT head no acute changes.  S/p initiation of hemodialysis.  Mental status has improved dramatically.  Pt denies any headaches, no slurred speech, no hemiparesis.  He has chronic gout with bilateral finger swelling, pain.  He also has chronic neuropathy.  Both legs are weak.  MRI showing scattered infarcts likely in setting of atrial fibrillation and both intracranial and extracranial atherosclerotic disease.    Interval Hx:  more awake.  Pending rehab.  Son at bedside    Past Medical History:  HTN (hypertension), benign [401.1]  HLD (hyperlipidemia) [272.4]  DM type 2 (diabetes mellitus, type 2) [250.00]  TIA (transient ischemic attack) [435.9]  Atrial fibrillation [427.31]  MI (myocardial infarction) [410.90]  circa 2014 and 2022.  CAD (coronary artery disease) [414.00]  Neuropathy [G62.9]  Stage 3 chronic kidney disease [N18.30]  2019 novel coronavirus disease (COVID-19) [U07.1]  12/2021.  Received the COVID-19 vaccine x 2 as of April 2022.    Past Surgical History:  Status post angioplasty with stent [V45.82]  LULU x 3 2/7/2014  S/P carotid endarterectomy [Z98.89]  left  S/P CABG (coronary artery bypass graft) [Z95.1]        Social History:  No toxic habits    Family History:  FAMILY HISTORY:  Family history of heart disease    Medications:  allopurinol 100 milliGRAM(s) Oral daily  apixaban 5 milliGRAM(s) Oral two times a day  aspirin enteric coated 81 milliGRAM(s) Oral daily  atorvastatin 40 milliGRAM(s) Oral at bedtime  bisacodyl 5 milliGRAM(s) Oral every 12 hours PRN  chlorhexidine 4% Liquid 1 Application(s) Topical <User Schedule>  dextrose 50% Injectable 25 Gram(s) IV Push once  dextrose 50% Injectable 25 Gram(s) IV Push once  dextrose 50% Injectable 12.5 Gram(s) IV Push once  dextrose Oral Gel 15 Gram(s) Oral once PRN  insulin lispro (ADMELOG) corrective regimen sliding scale   SubCutaneous three times a day before meals  insulin lispro (ADMELOG) corrective regimen sliding scale   SubCutaneous at bedtime  lidocaine   4% Patch 1 Patch Transdermal daily  metoprolol succinate  milliGRAM(s) Oral daily  mupirocin 2% Ointment 1 Application(s) Both Nostrils two times a day  polyethylene glycol 3350 17 Gram(s) Oral two times a day  povidone iodine 10% Solution 1 Application(s) Topical daily  senna 2 Tablet(s) Oral at bedtime  sodium chloride 0.9% lock flush 10 milliLiter(s) IV Push every 1 hour PRN  sodium chloride 0.9%. 1000 milliLiter(s) IV Continuous <Continuous>  sodium chloride 0.9%. 250 milliLiter(s) IV Continuous <Continuous>      Labs:  CBC Full  -  ( 19 May 2022 13:30 )  WBC Count : 9.42 K/uL  RBC Count : 3.65 M/uL  Hemoglobin : 8.6 g/dL  Hematocrit : 29.8 %  Platelet Count - Automated : 295 K/uL  Mean Cell Volume : 81.6 fl  Mean Cell Hemoglobin : 23.6 pg  Mean Cell Hemoglobin Concentration : 28.9 gm/dL  Auto Neutrophil # : 6.15 K/uL  Auto Lymphocyte # : 2.10 K/uL  Auto Monocyte # : 0.83 K/uL  Auto Eosinophil # : 0.23 K/uL  Auto Basophil # : 0.05 K/uL  Auto Neutrophil % : 65.4 %  Auto Lymphocyte % : 22.3 %  Auto Monocyte % : 8.8 %  Auto Eosinophil % : 2.4 %  Auto Basophil % : 0.5 %    05-19    135  |  95<L>  |  34<H>  ----------------------------<  175<H>  4.5   |  26  |  6.79<H>    Ca    9.1      19 May 2022 13:49  Phos  3.1     05-19      CAPILLARY BLOOD GLUCOSE      POCT Blood Glucose.: 240 mg/dL (20 May 2022 08:43)  POCT Blood Glucose.: 172 mg/dL (19 May 2022 21:41)  POCT Blood Glucose.: 125 mg/dL (19 May 2022 17:20)  POCT Blood Glucose.: 181 mg/dL (19 May 2022 11:33)              Vitals:  Vital Signs Last 24 Hrs  T(C): 36.6 (20 May 2022 08:18), Max: 36.6 (19 May 2022 20:58)  T(F): 97.8 (20 May 2022 08:18), Max: 97.9 (19 May 2022 20:58)  HR: 110 (20 May 2022 08:18) (70 - 110)  BP: 135/78 (20 May 2022 08:18) (111/41 - 160/77)  BP(mean): --  RR: 18 (20 May 2022 08:18) (18 - 18)  SpO2: 95% (20 May 2022 08:18) (95% - 96%)    ROS: as per HPI.          NEUROLOGICAL EXAM:    Mental status: awake, tired, no active distress.  He is oriented to person but does not answer other orientation questions.  He intermittently follows very simple commands and names very simple objects.        Cranial Nerves: Pupils were equal, round, reactive to light. Extraocular movements were intact. B BTT Facial sensation was intact to light touch. There was no facial asymmetry. The palate was upgoing symmetrically and tongue was midline. Hearing acuity was intact to finger rub AU. Shoulder shrug was full bilaterally    Motor exam: Bulk and tone were normal. moves all ext antigravity but drifts to bed.  There is relative right leg weakness in setting of pain which has been previously present.  Fine finger movements were limited by mental status.  There was no pronator drift    Reflexes: DTRs depressed, flexor plantars.     Sensation: Intact to noxious, seems intact to light touch, due to decreased attention did not assess other modalities     Coordination: no gross dysmetria    Gait: deferred    NIHSS: 12    Imaging:    ACC: [id number] EXAM:  CT BRAIN                        PROCEDURE DATE:  04/09/2022      INTERPRETATION:  CLINICAL INFORMATION:  Altered mental status, on   anticoagulation .    TECHNIQUE: Multiple contiguous axial images were acquired from the   skullbase to the vertex without the administration of intravenous   contrast.  Coronal and sagittal reformations were made.    COMPARISON: CT head 10/21/2021, brain MRI 02/23/2014.    FINDINGS:    Scattered lacunar infarcts in the bilateral cerebralhemispheres are   grossly stable compared to the 10/21/2021 head CT. No new additional   infarcts are noted over the time interval.    No acute intracranial hemorrhage, mass effect, or midline shift. The   brain demonstrates periventricular hypoattenuation, nonspecific in   etiology but likely representing chronic microvascular ischemic changes.    No abnormal extra-axial fluid collections are present.    The ventricles and sulci demonstrate age appropriate involutional   changes.  The basal cisterns are patent.    The orbits are unremarkable. The paranasal sinuses are clear. The mastoid   air cells are clear. The calvarium is intact.    IMPRESSION:    No acute intracranial abnormality is noted. If the patient has new and   persistent symptoms, consider short interval follow-up head CT or brain   MRI.    --- End of Report ---    GATITO VILLARREAL MD; Resident Radiologist  This document has been electronically signed.  JESSE CORONA MD; Attending Radiologist  This document has been electronically signed. Apr 9 2022  2:00PM        ACC: [id number] EXAM:  MR BRAIN                          PROCEDURE DATE:  05/11/2022          INTERPRETATION:  MR brain  without gadolinium    CLINICAL INFORMATION: Stroke.    TECHNIQUE: Limited Sagittal T1-weighted images, axial FLAIR images, and   axial diffusion weighted images of the brain were obtained.  Patient was   unable to hold still for remaining sequences.    FINDINGS:   MRI dated 02/23/2014 available for review.    The brain demonstrates small acute infarctions in the BILATERAL posterior   centrum semiovale and LEFT corona radiata and LEFT posterior   periventricular white matter with restricted diffusion. No intracranial   hemorrhage is recognized. No mass effect is found in the brain.    The ventricles, sulci and basal cisterns show mild global atrophy most   prominent within the BILATERAL cerebellum.    The vertebral and internal carotid arteries demonstrate expected flow   voids indicating their patency.    The orbits are unremarkable.  The paranasal sinuses are clear.  The nasal   cavity appears intact.  The nasopharynx is symmetric.  The central skull   base and temporal bones are intact.  The calvarium appears unremarkable.    IMPRESSION: Small acute infarctions in the BILATERAL posterior centrum   semiovale and LEFT corona radiata and LEFT posterior periventricular   white matter with restricted diffusion.   mild global atrophy most   prominent within the BILATERAL cerebellum.    --- End of Report ---            JESÚS PADGETT MD; Attending Radiologist  This document has been electronically signed. May 11 2022  5:47PM        Patient name: DYLAN DEL CASTILLO  YOB: 1955   Age: 67 (M)   MR#: [id number]  Study Date: 2/22/2022  [facility]  Study quality: Technically difficult  Referring Physician: Mendoza Corral MD  Blood Pressure: 152/82 mmHg  Height: 180 cm  Weight: 136 kg  BSA: 2.5 m2  ------------------------------------------------------------------------  PROCEDURE: Transthoracic echocardiogram with 2-D, M-Mode  and complete spectral and color flow Doppler. Verbal  consent was obtained for injection of  Ultrasonic Enhancing  Agent following a discussion of risks and benefits.  Following intravenous injection of Ultrasonic Enhancing  Agent , harmonic imaging was performed.  INDICATION: Chest pain, unspecified (R07.9)  ------------------------------------------------------------------------  Dimensions:    Normal Values:  LA:     4.4    2.0 - 4.0 cm  Ao:     4.0    2.0 - 3.8 cm  SEPTUM: 1.1    0.6 - 1.2 cm  PWT:    0.9    0.6 - 1.1 cm  LVIDd:  5.0    3.0 - 5.6 cm  LVIDs:  3.5    1.8 - 4.0 cm  Derived variables:  LVMI: 73 g/m2  RWT: 0.36  EF (Visual Estimate): 60 %  Doppler Peak Velocity (m/sec): AoV=1.5 TV=2.2  ------------------------------------------------------------------------  Observations:  Mitral Valve: Normal mitral valve.  Aortic Valve/Aorta: Calcified aortic valve with normal  opening. Minimal aortic regurgitation.  Normal aortic root size.  Left Atrium: Mildly dilated left atrium.  Left Ventricle: Endocardial visualization enhanced with  intravenous injection of Ultrasonic Enhancing Agent  (Definity).  Normal left ventricular internal dimensions and wall  thickness.  Normal left ventricular systolic function. No segmental  wall motion abnormalities.  Right Heart: Normal right atrium. Normal right ventricular  size and function.  Normal tricuspid valve. Mild tricuspid regurgitation.  Normal pulmonic valve. Mild pulmonic regurgitation.  Pericardium/Pleura: Normal pericardium with no pericardial  effusion.  Hemodynamic: No evidence of pulmonary hypertension.  ------------------------------------------------------------------------  Conclusions:  Endocardial visualization enhanced with intravenous  injection of Ultrasonic Enhancing Agent (Definity).  Normal left ventricular systolic function. No segmental  wall motion abnormalities.  ------------------------------------------------------------------------  Confirmed on  2/22/2022 - 16:17:39 by Ishan Ackerman MD, FASE  ------------------------------------------------------------------------    < from: CT Angio Head w/ IV Cont (05.12.22 @ 13:34) >    ACC: [id number] EXAM:  CT ANGIO NECK (W) IC                        ACC: [id number] EXAM:  CT ANGIO BRAIN (W)[facility]                          PROCEDURE DATE:  05/12/2022          INTERPRETATION:  CT angiography of the brain and neck    CLINICAL INDICATION: Recent infarcts. Carotid stenosis.    TECHNIQUE: Direct axial CT scanning of the brain and neck was obtained   from the vertex to the level of the clavicular heads after the dynamic   intravenous injection of 44 cc of Omnipaque 300. 0 cc were discarded.   Sagittal and coronal maximum intensity projection reformats were   provided.  Three-dimensional reconstructions were performed by the   radiologist using the "[employer] workstation.    Additionally, noncontrast axial CT scanning of the brain was obtained   from the skull base to the vertex. Sagittal and coronal reformats were   provided.    COMPARISON: MRA neck 10/24/2021    FINDINGS:    CT brain:    Previously seen acute infarcts in the bilateral cerebral hemispheres are   redemonstrated. No CT evidence for hemorrhagic transformation.    No hydrocephalus, midline shift, or effacement of basal cisterns.    No acute intracranial hemorrhage or vasogenic edema.    Mild to moderate white matter microvascular ischemic disease.    CTA brain:    Limited by relatively decreased opacification of the arteries.    Multifocal narrowing of the right skull base ICA due to calcified plaque,   the degree of which is not well evaluated.    Normal flow-related enhancement of the supraclinoid right ICA.    Lack of flow related enhancement of the petrous, precavernous, and   cavernous left ICA. Reconstitution of the vessel at its supraclinoid   segment.    Otherwise no flow-limiting stenosis.    Normal flow-related enhancement of the bilateral anterior, middle, and   posterior cerebral arteries.    Normal flow-related enhancement of the intradural vertebral arteries and   basilar artery.    No vascular aneurysm or AVM.    CTA neck:    Stenoses described in this report are on the basis of NASCET criteria.    Study is significantly limited by relatively decreased opacification of   the arteries.    Short segment stenosis of the mid left common carotid artery due to the   presence of partially calcified plaque is poorly evaluated. Flow-related   enhancement of the more proximal and distal left common carotid artery is   noted.    Long segment stenosis of the mid and distal right common carotid artery   due to the presence of partially calcified plaque is poorly evaluated.    Rapidly progressive stenosis of the left internal carotid artery   beginning at its origin. No flow related enhancement of the vessel beyond   its origin.    Normal flow-related enhancement of the bilateral vertebral arteries.    No gross evidence for acute arterial dissection.    IMPRESSION:    CTA brain:  Limited by relatively decreased opacification of the arteries.    Multifocal narrowing of the right skull base ICA due to calcified plaque,   the degree of which is not well evaluated.    Lack of flow related enhancement of the petrous, precavernous, and   cavernous left ICA. Reconstitution of the vessel at its supraclinoid   segment.    Otherwise no flow-limiting stenosis.    No vascular aneurysm or AVM.    CTA neck:  Limited by relatively decreased opacification of the arteries.    Short segment stenosis of the mid left common carotid artery due to the   presence of partially calcified plaque is poorly evaluated. Flow-related   enhancement of the more proximal and distal left common carotid artery is   noted.    Long segment stenosis of the mid and distal right common carotid artery   due to the presence of partially calcified plaque is poorly evaluated.    Rapidly progressive stenosis of the left internal carotid artery   beginning at its origin. No flow related enhancement of the vessel beyond   its origin.    Recommend correlation with contrast-enhanced MRI of the neck for further   evaluation.    --- End of Report ---            MIGUEL ANGEL CARRILLO MD; Attending Radiologist  This document has been electronically signed. May 12 2022  2:47PM    < end of copied text >

## 2022-05-20 NOTE — PROGRESS NOTE ADULT - ASSESSMENT
Impression:  This is a 67 year old gentleman pmhx CAD s/p MI/PCI/CABG, hx TIA, afib on rivaroxaban, HTN, DM2, PVD, gout, L CEA 2014, and CKD who presented to [facility] 4/11/22 with ANT, tremors, confusion and lethargy.  CT head no acute changes.  S/p initiation of hemodialysis.  Mental status has improved dramatically.  Pt denies any headaches, no slurred speech, no hemiparesis.  He has chronic gout with bilateral finger swelling, pain.  He also has chronic neuropathy.  Both legs are weak.      Persistent encephalopathy prompted re-consultation on 5/10/22.  MRI brain was ordered and revealed small acute infarctions in bilateral posterior centrum semiovale and left corona radiata and left posterior periventricular white matter.  Carotid ultrasound was performed showing new occlusion of the left internal carotid artery, along with greater than 70% stenosis involving the distal right common carotid artery, and mild to moderate flow-limiting stenosis is seen at the left external carotid artery.  Vascular surgery has been consulted and is recommending CTA.  He continues to be encephalopathic and lethargic.      Diagnosis and Recommendations:  Small scattered infarcts  - Infarcts may be contributing to but are likely not main force driving encephalopathy.  They are likely reflective of fact that brain is being perfused by right CCA/ICA that in itself shows severe stenosis.    - MRA was suboptimal not visualizing the neck but MRA head showed left KESHAWN coming from ACOM and left PCA feeding left MCA.  ICA showed no flow.    - in setting of atrial fibrillation infarcts may well be cardioembolic, continue anticoagulation as per cardiology  - s/p carotid doppler, suboptimal CTA/MRA.  However, it is apparent that there is a proximal left ICA occlusion, which would not be a candidate for intervention.  Intervention for right CCA/ICA stenosis would be high risk but it may indeed by a symptomatic stenosis.   - ECHO in February 2022 did not reveal severe cardiomyopathy, vegetation, or LV thrombus.    - HgA1c of 10.1 in February 2022 may be driving stroke risk.  Goal is < 7  - continue high-intensity statin therapy  -appreciate [facility] notes, no further inpt workup for these vascular issues in light of overall medical issues.  To be reassessed as outpt as per vascular    Delirium  - blinds open   - frequent re-orientation  - minimize night-time awakening  - optimize sleep/wake cycle  - toxic/metabolic/infectious workup and management as per primary team  - minimize polypharmacy as able  - minimize psychotropic medications as able   - rehab

## 2022-05-20 NOTE — PROGRESS NOTE ADULT - ASSESSMENT
·  Problem:[email or web address].co hemodialysis per renal  to cont as per renal  f/u labs      Problem/Plan - 2:  ·  Problem: Chronic atrial fibrillation. c/w a/c  cards f/u     Problem/Plan - 3:  ·  Problem: Cystitis.   ID follow up appreciated        Problem/Plan - 4:  ·  Problem: Type 2 diabetes mellitus. c/w insulin   endo f/u      Problem/Plan - 5:  ·  Problem: CAD (coronary artery disease).   rapid a fib  meds adjusted  hr stable  c/w a/c       Persistent encephalopathy   neuro follow up appreciated  f/u labs   mental status better  polypharmacy contributing   minimizing meds     carotid duplex noted  cta noted  mra/ mri noted  tx plans as per vasc / neuro   vasc to f/u  no plans for intervention now      rehab

## 2022-05-20 NOTE — PROGRESS NOTE ADULT - NS ATTEND AMEND GEN_ALL_CORE FT
will hd in am  held seroquel   slightly more responsive today   PT   hold hydralazine   gentle ivf today only

## 2022-05-20 NOTE — PROGRESS NOTE ADULT - SUBJECTIVE AND OBJECTIVE BOX
DATE OF SERVICE: 05-20-22 @ 12:12  CHIEF COMPLAINT:Patient is a 67y old  Male who presents with a chief complaint of Decreased urine output for the past week, leg oedema (20 May 2022 10:25)    	        PAST MEDICAL & SURGICAL HISTORY:  HTN (hypertension), benign      HLD (hyperlipidemia)      DM type 2 (diabetes mellitus, type 2)      TIA (transient ischemic attack)      Atrial fibrillation      MI (myocardial infarction)  circa 2014 and 2022.      CAD (coronary artery disease)      Neuropathy      Stage 3 chronic kidney disease      2019 novel coronavirus disease (COVID-19)  12/2021.  Received the COVID-19 vaccine x 2 as of April 2022.      Status post angioplasty with stent  LULU x 3 2/7/2014      S/P carotid endarterectomy  left              more awake alert  RESPIRATORY: No cough, wheezing, chills or hemoptysis; No Shortness of Breath  CARDIOVASCULAR: No chest pain, palpitations, passing out, dizziness, or leg swelling  GASTROINTESTINAL: No abdominal or epigastric pain. No nausea, vomiting, or hematemesis; No diarrhea or constipation. No melena or hematochezia.  GENITOURINARY: No dysuria, frequency, hematuria, or incontinence  NEUROLOGICAL: No headaches,    Medications:  MEDICATIONS  (STANDING):  allopurinol 100 milliGRAM(s) Oral daily  apixaban 5 milliGRAM(s) Oral two times a day  aspirin enteric coated 81 milliGRAM(s) Oral daily  atorvastatin 40 milliGRAM(s) Oral at bedtime  chlorhexidine 4% Liquid 1 Application(s) Topical <User Schedule>  dextrose 50% Injectable 25 Gram(s) IV Push once  dextrose 50% Injectable 25 Gram(s) IV Push once  dextrose 50% Injectable 12.5 Gram(s) IV Push once  insulin lispro (ADMELOG) corrective regimen sliding scale   SubCutaneous at bedtime  insulin lispro (ADMELOG) corrective regimen sliding scale   SubCutaneous three times a day before meals  lidocaine   4% Patch 1 Patch Transdermal daily  metoprolol succinate  milliGRAM(s) Oral daily  mupirocin 2% Ointment 1 Application(s) Both Nostrils two times a day  polyethylene glycol 3350 17 Gram(s) Oral two times a day  povidone iodine 10% Solution 1 Application(s) Topical daily  senna 2 Tablet(s) Oral at bedtime  sodium chloride 0.9%. 250 milliLiter(s) (250 mL/Hr) IV Continuous <Continuous>  sodium chloride 0.9%. 1000 milliLiter(s) (50 mL/Hr) IV Continuous <Continuous>    MEDICATIONS  (PRN):  bisacodyl 5 milliGRAM(s) Oral every 12 hours PRN Constipation  dextrose Oral Gel 15 Gram(s) Oral once PRN Blood Glucose LESS THAN 70 milliGRAM(s)/deciliter  sodium chloride 0.9% lock flush 10 milliLiter(s) IV Push every 1 hour PRN Pre/post blood products, medications, blood draw, and to maintain line patency    	    PHYSICAL EXAM:  T(C): 36.6 (05-20-22 @ 08:18), Max: 36.6 (05-19-22 @ 20:58)  HR: 110 (05-20-22 @ 08:18) (70 - 110)  BP: 135/78 (05-20-22 @ 08:18) (111/41 - 160/77)  RR: 18 (05-20-22 @ 08:18) (18 - 18)  SpO2: 95% (05-20-22 @ 08:18) (95% - 96%)  Wt(kg): --  I&O's Summary    19 May 2022 07:01  -  20 May 2022 07:00  --------------------------------------------------------  IN: 1100 mL / OUT: 1500 mL / NET: -400 mL        Appearance: Normal	  HEENT:   Normal oral mucosa, PERRL, EOMI	  Lymphatic: No lymphadenopathy  Cardiovascular: Normal S1 S2, No JVD, No murmurs, No edema  Respiratory: Lungs clear to auscultation	    Gastrointestinal:  Soft, Non-tender, + BS	    Neurologic: Non-focal  Extremities: pvd  dec rom     TELEMETRY: 	    ECG:  	  RADIOLOGY:  OTHER: 	  	  LABS:	 	    CARDIAC MARKERS:                                8.6    9.42  )-----------( 295      ( 19 May 2022 13:30 )             29.8     05-19    135  |  95<L>  |  34<H>  ----------------------------<  175<H>  4.5   |  26  |  6.79<H>    Ca    9.1      19 May 2022 13:49  Phos  3.1     05-19      proBNP:   Lipid Profile:   HgA1c:   TSH:

## 2022-05-20 NOTE — PROGRESS NOTE ADULT - SUBJECTIVE AND OBJECTIVE BOX
[facility]   [phone]  RENAL FOLLOW UP NOTE  --------------------------------------------------------------------------------  Chief Complaint:    24 hour events/subjective:    patient seen and examined.   awake, alert  denies sob, palpitations    PAST HISTORY  --------------------------------------------------------------------------------  No significant changes to PMH, PSH, FHx, SHx, unless otherwise noted    ALLERGIES & MEDICATIONS  --------------------------------------------------------------------------------  Allergies    nitrofurantoin (Nephrotoxicity)    Intolerances      Standing Inpatient Medications  allopurinol 100 milliGRAM(s) Oral daily  apixaban 5 milliGRAM(s) Oral two times a day  aspirin enteric coated 81 milliGRAM(s) Oral daily  atorvastatin 40 milliGRAM(s) Oral at bedtime  chlorhexidine 4% Liquid 1 Application(s) Topical <User Schedule>  dextrose 50% Injectable 25 Gram(s) IV Push once  dextrose 50% Injectable 25 Gram(s) IV Push once  dextrose 50% Injectable 12.5 Gram(s) IV Push once  diltiazem    milliGRAM(s) Oral daily  insulin lispro (ADMELOG) corrective regimen sliding scale   SubCutaneous three times a day before meals  insulin lispro (ADMELOG) corrective regimen sliding scale   SubCutaneous at bedtime  lidocaine   4% Patch 1 Patch Transdermal daily  metoprolol succinate  milliGRAM(s) Oral daily  mupirocin 2% Ointment 1 Application(s) Both Nostrils two times a day  polyethylene glycol 3350 17 Gram(s) Oral two times a day  povidone iodine 10% Solution 1 Application(s) Topical daily  senna 2 Tablet(s) Oral at bedtime  sodium chloride 0.9%. 250 milliLiter(s) IV Continuous <Continuous>    PRN Inpatient Medications  bisacodyl 5 milliGRAM(s) Oral every 12 hours PRN  dextrose Oral Gel 15 Gram(s) Oral once PRN  sodium chloride 0.9% lock flush 10 milliLiter(s) IV Push every 1 hour PRN      REVIEW OF SYSTEMS  --------------------------------------------------------------------------------    Gen: denies fevers/chills,  CVS: denies chest pain/palpitations  Resp: denies SOB/Cough  GI: Denies N/V/Abd pain  : Denies dysuria    VITALS/PHYSICAL EXAM  --------------------------------------------------------------------------------  T(C): 36.6 (05-20-22 @ 04:33), Max: 36.6 (05-19-22 @ 20:58)  HR: 106 (05-20-22 @ 04:33) (70 - 106)  BP: 160/77 (05-20-22 @ 04:33) (111/41 - 160/77)  RR: 18 (05-20-22 @ 04:33) (18 - 18)  SpO2: 96% (05-20-22 @ 04:33) (95% - 96%)  Wt(kg): --        05-19-22 @ 07:01  -  05-20-22 @ 07:00  --------------------------------------------------------  IN: 1100 mL / OUT: 1500 mL / NET: -400 mL      Physical Exam:  	              Gen: appears comfortable more communicative   	Pulm: Decreased breath sounds b/l bases.  	CV: No JVD. IRR, tachy  	Abd: +BS, soft, nontender, softly distended, obese               Extremity no edema              Extremity LE: + hyperpigmented bilateral LE with no edema              Vascular: R IJ HD catheter, L arm AVF     LABS/STUDIES  --------------------------------------------------------------------------------              8.6    9.42  >-----------<  295      [05-19-22 @ 13:30]              29.8     135  |  95  |  34  ----------------------------<  175      [05-19-22 @ 13:49]  4.5   |  26  |  6.79        Ca     9.1     [05-19-22 @ 13:49]      Phos  3.1     [05-19-22 @ 13:49]            Creatinine Trend:  SCr 6.79 [05-19 @ 13:49]  SCr 7.34 [05-17 @ 05:54]  SCr 5.98 [05-16 @ 06:28]  SCr 4.22 [05-15 @ 07:37]  SCr 7.34 [05-12 @ 17:15]                  Iron 21, TIBC 245, %sat 9      [05-17-22 @ 05:54]  Ferritin 120      [05-17-22 @ 05:54]  PTH -- (Ca 8.3)      [04-15-22 @ 12:18]   150  PTH -- (Ca --)      [04-03-22 @ 23:06]   97  HbA1c 11.7      [11-07-19 @ 09:14]  TSH 1.71      [05-12-22 @ 07:11]

## 2022-05-20 NOTE — PROGRESS NOTE ADULT - ASSESSMENT
A/P    68 y/o M with history of CAD, s/p multiple PCI, with most recent 2/22 PCI of LAD in setting of NSTEMI, on ASA only (pradaxa), chronic atrial fibrillation maintained on Pradaxa, essential HTN, type 2 DM previously on oral medications but on insulin, diabetic neuropathy with chronic RIGHT LE venous stasis changes>left, LEFT foot ulcer seen by podiatry, past endarterectomy LEFT CEA in 2014 presenting with 1 week of decreased urine output, cystitis with hematuria     #ANT on CKD, new HD  -oliguric renal failure in setting of UTI/cystitis  -New HD for uremia, renal failure  -sp rrt 4/12 for uncontrolled bleeding sp  right groin shiley removal  - monitor h/h - stable  -s/p L AVG 4/18  -s/p permacath placement 4/20  -renal f/ u    #AMS/Lethargy  -recent ams from pain meds  -AMS sp RRT 4/27  likely secondary to pain med   -repeat CT 4/27 unremarkable --  discontinued Percocet  -MRI 5/11  revealed small acute infarctions in bilateral posterior centrum semiovale and left corona radiata and left posterior periventricular white matter  -on asa statin  -repeat echo with no interval changes  -CTa head neck noted; neuro following     #Acute on chronic HFpEF  -stable  -continue volume removal with HD  -c/w bb  -repeat echo with stable borderline lv fxn    #Chronic AF  -rates elevated today  -c/w metoprolol succ 100 mg qd  -can increase cardizem to 180 mg daily   -give 30 mg cardizem po x 1 now   -c/w eliquis 5 mg BID for now - heme stable     #HTN  -stable  -c/w meds       #CAD, s/p PCI, most recent 2/2022  -stable, cont asa  -off plavix due to a/c indication  -statin     #carotid stenosis   -previous MRA  noted with Greater than 75% stenosis of the right distal common carotid artery. Approximately 60% stenosis of the proximal left internal carotid artery.  -carotid dopplers 5/11 noted :  There is new occlusion of the left internal carotid artery;  greater than 70% stenosis involving the distal right common carotid artery as was seen previously. Mild to moderate flow-limiting stenosis is seen at the left external   carotid artery.  -CTa head and neck 5/12 noted  -Continue ASA and Statin Therapy  -neuro fu  / work up / imaging per vascular   -mra head/ neck noted- per vascular outpt follow up for repeat to re eval w MRI / mra of the brain neck       DCP

## 2022-05-21 NOTE — PROGRESS NOTE ADULT - SUBJECTIVE AND OBJECTIVE BOX
DATE OF SERVICE: 05-21-22 @ 12:42  CHIEF COMPLAINT:Patient is a 67y old  Male who presents with a chief complaint of Decreased urine output for the past week, leg oedema (21 May 2022 08:13)    	        PAST MEDICAL & SURGICAL HISTORY:  HTN (hypertension), benign      HLD (hyperlipidemia)      DM type 2 (diabetes mellitus, type 2)      TIA (transient ischemic attack)      Atrial fibrillation      MI (myocardial infarction)  circa 2014 and 2022.      CAD (coronary artery disease)      Neuropathy      Stage 3 chronic kidney disease      2019 novel coronavirus disease (COVID-19)  12/2021.  Received the COVID-19 vaccine x 2 as of April 2022.      Status post angioplasty with stent  LULU x 3 2/7/2014      S/P carotid endarterectomy  left            awake alert  answers questions    Medications:  MEDICATIONS  (STANDING):  allopurinol 100 milliGRAM(s) Oral daily  apixaban 5 milliGRAM(s) Oral two times a day  aspirin enteric coated 81 milliGRAM(s) Oral daily  atorvastatin 40 milliGRAM(s) Oral at bedtime  chlorhexidine 4% Liquid 1 Application(s) Topical <User Schedule>  dextrose 50% Injectable 25 Gram(s) IV Push once  dextrose 50% Injectable 25 Gram(s) IV Push once  dextrose 50% Injectable 12.5 Gram(s) IV Push once  diltiazem    milliGRAM(s) Oral daily  epoetin naz-epbx (RETACRIT) Injectable 78437 Unit(s) IV Push once  insulin lispro (ADMELOG) corrective regimen sliding scale   SubCutaneous three times a day before meals  insulin lispro (ADMELOG) corrective regimen sliding scale   SubCutaneous at bedtime  lidocaine   4% Patch 1 Patch Transdermal daily  metoprolol succinate  milliGRAM(s) Oral daily  mupirocin 2% Ointment 1 Application(s) Both Nostrils two times a day  polyethylene glycol 3350 17 Gram(s) Oral two times a day  povidone iodine 10% Solution 1 Application(s) Topical daily  senna 2 Tablet(s) Oral at bedtime  sodium chloride 0.9%. 1000 milliLiter(s) (50 mL/Hr) IV Continuous <Continuous>  sodium chloride 0.9%. 250 milliLiter(s) (250 mL/Hr) IV Continuous <Continuous>    MEDICATIONS  (PRN):  bisacodyl 5 milliGRAM(s) Oral every 12 hours PRN Constipation  dextrose Oral Gel 15 Gram(s) Oral once PRN Blood Glucose LESS THAN 70 milliGRAM(s)/deciliter  sodium chloride 0.9% lock flush 10 milliLiter(s) IV Push every 1 hour PRN Pre/post blood products, medications, blood draw, and to maintain line patency    	    PHYSICAL EXAM:  T(C): 36.4 (05-21-22 @ 12:39), Max: 36.5 (05-20-22 @ 20:30)  HR: 89 (05-21-22 @ 12:39) (82 - 107)  BP: 154/78 (05-21-22 @ 12:39) (133/63 - 169/71)  RR: 18 (05-21-22 @ 12:39) (18 - 19)  SpO2: 96% (05-21-22 @ 12:39) (95% - 96%)  Wt(kg): --  I&O's Summary      Appearance: Normal	  HEENT:   Normal oral mucosa, 	    Cardiovascular: Normal S1 S2, No JVD,   Respiratory: dec bs   Gastrointestinal:  Soft, Non-tender, + BS	  Neurologic: Non-focal  Extremities:dec rom     TELEMETRY: 	    ECG:  	  RADIOLOGY:  OTHER: 	  	  LABS:	 	    CARDIAC MARKERS:                                8.5    9.75  )-----------( 307      ( 21 May 2022 07:20 )             30.5     05-21    137  |  98  |  35<H>  ----------------------------<  167<H>  4.4   |  24  |  5.94<H>    Ca    9.1      21 May 2022 07:23  Phos  3.1     05-19      proBNP:   Lipid Profile:   HgA1c:   TSH:

## 2022-05-21 NOTE — PROGRESS NOTE ADULT - ASSESSMENT
67 year old gentleman with PMH of CAD, NSTEMI s/p 3 stents in 2014 (stents to LAD, proximal and distal LCx), ischemic cardiomyopathy, HTN for 10 years, T2DM for 26 years c/b peripheral neuropathy, CVA, TIA, Afib on Pradaxa, chronic RLE wound, L carotid stenosis s/p endarterectomy (2014) just recently admitted  to the [facility] on 2/19/2022 with acute onset chest pain for one day found to have an NSTEMI, CAD, s/p PCI in setting of increased AF rates off bb for 3 days and resolved chest pain, his CKMB peaked and Echo noted with EF 60%,normal LV function, mild TR, mild AL. He was s/p [facility] with 85% proximal LAD s/p balloon angioplasty and LULU. He presented to [facility] ED with decreased urine output over the week. Mr. Stevens went to [facility] for hematuria and was started  on Nitrofurantoin. Pt is still taking Nitrofurantoin w/ 5 days left. He came to the ED due to worsening symptoms. Pt reports having a similar incident 4-5 years ago that resolved spontaneously. He reports increased leg edema Dyspnea worse on exertion. his baseline Serum creatinine is in the 2.0 to 2.3 mg/dl range. ANT with serum creatinine of 8.29 with bun 144 and k 5.5      1- ANT on ckd III ---> ESRD likely   2- chf chronic   3- hyperphosphatemia /shpt   4- anemia   5- hyperlipidemia   6- HTN /a fib  7- TIA hx   8- hx of carotid stenosis      HD today  F200, 225 min, , , 0 L fluid removal  see HD flowsheet  renvela 2 tab with meals   off hydralazine due to orthostatic hypotension  anemia - epogen 08512 Units TIW with HD.    PT

## 2022-05-21 NOTE — PROGRESS NOTE ADULT - SUBJECTIVE AND OBJECTIVE BOX
[facility]   [phone]  RENAL FOLLOW UP NOTE  --------------------------------------------------------------------------------  Chief Complaint:    24 hour events/subjective:    seen earlier.   confused   pt wife at bedside     PAST HISTORY  --------------------------------------------------------------------------------  No significant changes to PMH, PSH, FHx, SHx, unless otherwise noted    ALLERGIES & MEDICATIONS  --------------------------------------------------------------------------------  Allergies    nitrofurantoin (Nephrotoxicity)    Intolerances      Standing Inpatient Medications  allopurinol 100 milliGRAM(s) Oral daily  apixaban 5 milliGRAM(s) Oral two times a day  aspirin enteric coated 81 milliGRAM(s) Oral daily  atorvastatin 40 milliGRAM(s) Oral at bedtime  chlorhexidine 4% Liquid 1 Application(s) Topical <User Schedule>  dextrose 50% Injectable 25 Gram(s) IV Push once  dextrose 50% Injectable 25 Gram(s) IV Push once  dextrose 50% Injectable 12.5 Gram(s) IV Push once  diltiazem    milliGRAM(s) Oral daily  epoetin naz-epbx (RETACRIT) Injectable 76939 Unit(s) IV Push once  insulin lispro (ADMELOG) corrective regimen sliding scale   SubCutaneous three times a day before meals  insulin lispro (ADMELOG) corrective regimen sliding scale   SubCutaneous at bedtime  lidocaine   4% Patch 1 Patch Transdermal daily  metoprolol succinate  milliGRAM(s) Oral daily  mupirocin 2% Ointment 1 Application(s) Both Nostrils two times a day  polyethylene glycol 3350 17 Gram(s) Oral two times a day  povidone iodine 10% Solution 1 Application(s) Topical daily  senna 2 Tablet(s) Oral at bedtime  sodium chloride 0.9%. 1000 milliLiter(s) IV Continuous <Continuous>  sodium chloride 0.9%. 250 milliLiter(s) IV Continuous <Continuous>    PRN Inpatient Medications  bisacodyl 5 milliGRAM(s) Oral every 12 hours PRN  dextrose Oral Gel 15 Gram(s) Oral once PRN  sodium chloride 0.9% lock flush 10 milliLiter(s) IV Push every 1 hour PRN      REVIEW OF SYSTEMS  --------------------------------------------------------------------------------      VITALS/PHYSICAL EXAM  --------------------------------------------------------------------------------  T(C): 36.4 (05-21-22 @ 12:39), Max: 36.5 (05-20-22 @ 20:30)  HR: 89 (05-21-22 @ 12:39) (82 - 107)  BP: 154/78 (05-21-22 @ 12:39) (133/63 - 169/71)  RR: 18 (05-21-22 @ 12:39) (18 - 19)  SpO2: 96% (05-21-22 @ 12:39) (95% - 96%)  Wt(kg): --        Physical Exam:  	       Gen:  confused   	Pulm: Decreased breath sounds b/l bases.  	CV: No JVD. IRR, tachy  	Abd: +BS, soft, nontender, softly distended, obese               Extremity no edema              Extremity LE: + hyperpigmented bilateral LE with no edema              Vascular: R IJ HD catheter, L arm AVF       LABS/STUDIES  --------------------------------------------------------------------------------              8.5    9.75  >-----------<  307      [05-21-22 @ 07:20]              30.5     137  |  98  |  35  ----------------------------<  167      [05-21-22 @ 07:23]  4.4   |  24  |  5.94        Ca     9.1     [05-21-22 @ 07:23]      Phos  3.1     [05-19-22 @ 13:49]            Creatinine Trend:  SCr 5.94 [05-21 @ 07:23]  SCr 6.79 [05-19 @ 13:49]  SCr 7.34 [05-17 @ 05:54]  SCr 5.98 [05-16 @ 06:28]  SCr 4.22 [05-15 @ 07:37]                  Iron 21, TIBC 245, %sat 9      [05-17-22 @ 05:54]  Ferritin 120      [05-17-22 @ 05:54]  PTH -- (Ca 8.3)      [04-15-22 @ 12:18]   150  PTH -- (Ca --)      [04-03-22 @ 23:06]   97  HbA1c 11.7      [11-07-19 @ 09:14]  TSH 1.71      [05-12-22 @ 07:11]

## 2022-05-21 NOTE — PROVIDER CONTACT NOTE (OTHER) - ASSESSMENT
A&O 0-2 confused. Denies CP & SOB. No S&S of pain or discomfort. Pt refuses to open mouth for medication.

## 2022-05-21 NOTE — PROGRESS NOTE ADULT - ASSESSMENT
A/P    66 y/o M with history of CAD, s/p multiple PCI, with most recent 2/22 PCI of LAD in setting of NSTEMI, on ASA only (pradaxa), chronic atrial fibrillation maintained on Pradaxa, essential HTN, type 2 DM previously on oral medications but on insulin, diabetic neuropathy with chronic RIGHT LE venous stasis changes>left, LEFT foot ulcer seen by podiatry, past endarterectomy LEFT CEA in 2014 presenting with 1 week of decreased urine output, cystitis with hematuria     #ANT on CKD, new HD  -oliguric renal failure in setting of UTI/cystitis  -New HD for uremia, renal failure  -sp rrt 4/12 for uncontrolled bleeding sp  right groin shiley removal  - monitor h/h - stable  -s/p L AVG 4/18  -s/p permacath placement 4/20  -renal f/ u    #AMS/Lethargy  -recent ams from pain meds  -AMS sp RRT 4/27  likely secondary to pain med   -repeat CT 4/27 unremarkable --  discontinued Percocet  -MRI 5/11  revealed small acute infarctions in bilateral posterior centrum semiovale and left corona radiata and left posterior periventricular white matter  -on asa statin  -repeat echo with no interval changes  -CTa head neck noted; neuro following     #Acute on chronic HFpEF  -stable  -continue volume removal with HD  -c/w bb  -repeat echo with stable borderline lv fxn    #Chronic AF  -rates elevated today  -c/w metoprolol succ 100 mg qd  -can increase cardizem to 240 mg daily   -c/w eliquis 5 mg BID for now - heme stable     #HTN  -stable  -c/w meds       #CAD, s/p PCI, most recent 2/2022  -stable, cont asa  -off plavix due to a/c indication  -statin     #carotid stenosis   -previous MRA  noted with Greater than 75% stenosis of the right distal common carotid artery. Approximately 60% stenosis of the proximal left internal carotid artery.  -carotid dopplers 5/11 noted :  There is new occlusion of the left internal carotid artery;  greater than 70% stenosis involving the distal right common carotid artery as was seen previously. Mild to moderate flow-limiting stenosis is seen at the left external   carotid artery.  -CTa head and neck 5/12 noted  -Continue ASA and Statin Therapy  -neuro fu  / work up / imaging per vascular   -mra head/ neck noted- per vascular outpt follow up for repeat to re eval w MRI / mra of the brain neck       DCP     35 minutes spent on total encounter; more than 50% of the visit was spent counseling and/or coordinating care by the attending physician.

## 2022-05-21 NOTE — PROGRESS NOTE ADULT - ASSESSMENT
·  Problem:[email or web address] hemodialysis per renal  to cont as per renal  f/u labs      Problem/Plan - 2:  ·  Problem: Chronic atrial fibrillation. c/w a/c  cards f/u     Problem/Plan - 3:  ·  Problem: Cystitis.   ID follow up appreciated        Problem/Plan - 4:  ·  Problem: Type 2 diabetes mellitus. c/w insulin   endo f/u      Problem/Plan - 5:  ·  Problem: CAD (coronary artery disease).   rapid a fib  meds adjusted  hr stable  c/w a/c       Persistent encephalopathy   neuro follow up appreciated  f/u labs   mental status better  polypharmacy contributing   minimizing meds     carotid duplex noted  cta noted  mra/ mri noted  tx plans as per vasc / neuro   vasc to f/u  no plans for intervention now    spoke w/ wife at bedside      rehab

## 2022-05-21 NOTE — PROGRESS NOTE ADULT - SUBJECTIVE AND OBJECTIVE BOX
CARDIOLOGY FOLLOW UP - Dr. Mazariegos  Date of Service: 5/21/22  CC: no events    Review of Systems:  Constitutional: No fever, weight loss, or fatigue  Respiratory: No cough, wheezing, or hemoptysis, no shortness of breath  Cardiovascular: No chest pain, palpitations, passing out, dizziness, or leg swelling  Gastrointestinal: No abd or epigastric pain. No nausea, vomiting, or hematemesis; no diarrhea or consiptaiton, no melena or hematochezia  Vascular: No edema     TELEMETRY:    PHYSICAL EXAM:  T(C): 36.4 (05-21-22 @ 04:50), Max: 36.6 (05-20-22 @ 08:18)  HR: 104 (05-21-22 @ 04:50) (82 - 110)  BP: 169/71 (05-21-22 @ 04:50) (133/63 - 169/71)  RR: 19 (05-21-22 @ 04:50) (18 - 19)  SpO2: 95% (05-21-22 @ 04:50) (95% - 96%)  Wt(kg): --  I&O's Summary      Appearance: Normal	  Cardiovascular: Normal S1 S2,RRR, No JVD, No murmurs  Respiratory: Lungs clear to auscultation	  Gastrointestinal:  Soft, Non-tender, + BS	  Extremities: Normal range of motion, No clubbing, cyanosis or edema  Vascular: Peripheral pulses palpable 2+ bilaterally       Home Medications:  allopurinol 100 mg oral tablet: 1 tab(s) orally once a day (03 Apr 2022 06:34)  aspirin 81 mg oral delayed release tablet: 1 tab(s) orally once a day (03 Apr 2022 06:34)  bumetanide 2 mg oral tablet: 1 tab(s) orally once a day (03 Apr 2022 06:34)  insulin glargine 100 units/mL subcutaneous solution: 25 unit(s) subcutaneous once a day (at bedtime) (03 Apr 2022 06:34)  metOLazone 5 mg oral tablet: 1 tab(s) orally 3 times a week (03 Apr 2022 06:34)  metoprolol succinate 50 mg oral tablet, extended release: 2 tab(s) orally once a day (03 Apr 2022 06:34)  NovoLOG 100 units/mL subcutaneous solution: subcutaneous 4 times a day (before meals and at bedtime) (03 Apr 2022 06:34)  NovoLOG FlexPen 100 units/mL injectable solution: 10 unit(s) subcutaneous 3 times a day (03 Apr 2022 06:34)  oxycodone-acetaminophen 5 mg-325 mg oral tablet: 1 tab(s) orally 2 times a day (03 Apr 2022 06:34)  Pradaxa 150 mg oral capsule: 1 cap(s) orally 2 times a day (03 Apr 2022 06:34)  pregabalin 100 mg oral capsule: 1 cap(s) orally 3 times a day (03 Apr 2022 06:34)        MEDICATIONS  (STANDING):  allopurinol 100 milliGRAM(s) Oral daily  apixaban 5 milliGRAM(s) Oral two times a day  aspirin enteric coated 81 milliGRAM(s) Oral daily  atorvastatin 40 milliGRAM(s) Oral at bedtime  chlorhexidine 4% Liquid 1 Application(s) Topical <User Schedule>  dextrose 50% Injectable 25 Gram(s) IV Push once  dextrose 50% Injectable 25 Gram(s) IV Push once  dextrose 50% Injectable 12.5 Gram(s) IV Push once  diltiazem    milliGRAM(s) Oral daily  epoetin naz-epbx (RETACRIT) Injectable 44363 Unit(s) IV Push once  insulin lispro (ADMELOG) corrective regimen sliding scale   SubCutaneous three times a day before meals  insulin lispro (ADMELOG) corrective regimen sliding scale   SubCutaneous at bedtime  lidocaine   4% Patch 1 Patch Transdermal daily  metoprolol succinate  milliGRAM(s) Oral daily  mupirocin 2% Ointment 1 Application(s) Both Nostrils two times a day  polyethylene glycol 3350 17 Gram(s) Oral two times a day  povidone iodine 10% Solution 1 Application(s) Topical daily  senna 2 Tablet(s) Oral at bedtime  sodium chloride 0.9%. 1000 milliLiter(s) (50 mL/Hr) IV Continuous <Continuous>  sodium chloride 0.9%. 250 milliLiter(s) (250 mL/Hr) IV Continuous <Continuous>        EKG:  RADIOLOGY:  DIAGNOSTIC TESTING:  [ ] Echocardiogram:  [ ] Catherterization:  [ ] Stress Test:  OTHER:     LABS:	 	                          8.5    9.75  )-----------( 307      ( 21 May 2022 07:20 )             30.5     05-19    135  |  95<L>  |  34<H>  ----------------------------<  175<H>  4.5   |  26  |  6.79<H>    Ca    9.1      19 May 2022 13:49  Phos  3.1     05-19            CARDIAC MARKERS:

## 2022-05-22 NOTE — PROGRESS NOTE ADULT - ASSESSMENT
A/P    68 y/o M with history of CAD, s/p multiple PCI, with most recent 2/22 PCI of LAD in setting of NSTEMI, on ASA only (pradaxa), chronic atrial fibrillation maintained on Pradaxa, essential HTN, type 2 DM previously on oral medications but on insulin, diabetic neuropathy with chronic RIGHT LE venous stasis changes>left, LEFT foot ulcer seen by podiatry, past endarterectomy LEFT CEA in 2014 presenting with 1 week of decreased urine output, cystitis with hematuria     #ANT on CKD, new HD  -oliguric renal failure in setting of UTI/cystitis  -New HD for uremia, renal failure  -sp rrt 4/12 for uncontrolled bleeding sp  right groin shiley removal  - monitor h/h - stable  -s/p L AVG 4/18  -s/p permacath placement 4/20  -renal f/ u    #AMS/Lethargy  -recent ams from pain meds  -AMS sp RRT 4/27  likely secondary to pain med   -repeat CT 4/27 unremarkable --  discontinued Percocet  -MRI 5/11  revealed small acute infarctions in bilateral posterior centrum semiovale and left corona radiata and left posterior periventricular white matter  -on asa statin  -repeat echo with no interval changes  -CTa head neck noted; neuro following     #Acute on chronic HFpEF  -stable  -continue volume removal with HD  -c/w bb  -repeat echo with stable borderline lv fxn    #Chronic AF  -rates elevated today  -c/w metoprolol succ 100 mg qd  -can increase cardizem to 240 mg daily   -c/w eliquis 5 mg BID for now - heme stable     #HTN  -stable  -c/w meds       #CAD, s/p PCI, most recent 2/2022  -stable, cont asa  -off plavix due to a/c indication  -statin     #carotid stenosis   -previous MRA  noted with Greater than 75% stenosis of the right distal common carotid artery. Approximately 60% stenosis of the proximal left internal carotid artery.  -carotid dopplers 5/11 noted :  There is new occlusion of the left internal carotid artery;  greater than 70% stenosis involving the distal right common carotid artery as was seen previously. Mild to moderate flow-limiting stenosis is seen at the left external   carotid artery.  -CTa head and neck 5/12 noted  -Continue ASA and Statin Therapy  -neuro fu  / work up / imaging per vascular   -mra head/ neck noted- per vascular outpt follow up for repeat to re eval w MRI / mra of the brain neck       DCP     35 minutes spent on total encounter; more than 50% of the visit was spent counseling and/or coordinating care by the attending physician.

## 2022-05-22 NOTE — PROGRESS NOTE ADULT - SUBJECTIVE AND OBJECTIVE BOX
DATE OF SERVICE: 05-22-22 @ 12:26  CHIEF COMPLAINT:Patient is a 67y old  Male who presents with a chief complaint of Decreased urine output for the past week, leg oedema (22 May 2022 08:54)    	        PAST MEDICAL & SURGICAL HISTORY:  HTN (hypertension), benign      HLD (hyperlipidemia)      DM type 2 (diabetes mellitus, type 2)      TIA (transient ischemic attack)      Atrial fibrillation      MI (myocardial infarction)  circa 2014 and 2022.      CAD (coronary artery disease)      Neuropathy      Stage 3 chronic kidney disease      2019 novel coronavirus disease (COVID-19)  12/2021.  Received the COVID-19 vaccine x 2 as of April 2022.      Status post angioplasty with stent  LULU x 3 2/7/2014      S/P carotid endarterectomy  left          awake  some r arm weakness ?   no cp or sob  no chills    Medications:  MEDICATIONS  (STANDING):  allopurinol 100 milliGRAM(s) Oral daily  apixaban 5 milliGRAM(s) Oral two times a day  aspirin enteric coated 81 milliGRAM(s) Oral daily  atorvastatin 40 milliGRAM(s) Oral at bedtime  chlorhexidine 4% Liquid 1 Application(s) Topical <User Schedule>  dextrose 50% Injectable 25 Gram(s) IV Push once  dextrose 50% Injectable 12.5 Gram(s) IV Push once  dextrose 50% Injectable 25 Gram(s) IV Push once  diltiazem    milliGRAM(s) Oral daily  insulin lispro (ADMELOG) corrective regimen sliding scale   SubCutaneous three times a day before meals  insulin lispro (ADMELOG) corrective regimen sliding scale   SubCutaneous at bedtime  lidocaine   4% Patch 1 Patch Transdermal daily  metoprolol succinate  milliGRAM(s) Oral daily  mupirocin 2% Ointment 1 Application(s) Both Nostrils two times a day  polyethylene glycol 3350 17 Gram(s) Oral two times a day  povidone iodine 10% Solution 1 Application(s) Topical daily  senna 2 Tablet(s) Oral at bedtime  sodium chloride 0.9%. 1000 milliLiter(s) (50 mL/Hr) IV Continuous <Continuous>  sodium chloride 0.9%. 250 milliLiter(s) (250 mL/Hr) IV Continuous <Continuous>    MEDICATIONS  (PRN):  bisacodyl 5 milliGRAM(s) Oral every 12 hours PRN Constipation  dextrose Oral Gel 15 Gram(s) Oral once PRN Blood Glucose LESS THAN 70 milliGRAM(s)/deciliter  sodium chloride 0.9% lock flush 10 milliLiter(s) IV Push every 1 hour PRN Pre/post blood products, medications, blood draw, and to maintain line patency    	    PHYSICAL EXAM:  T(C): 36.3 (05-22-22 @ 11:36), Max: 36.7 (05-21-22 @ 14:11)  HR: 101 (05-22-22 @ 11:36) (84 - 101)  BP: 146/64 (05-22-22 @ 11:36) (110/62 - 154/78)  RR: 18 (05-22-22 @ 11:36) (17 - 18)  SpO2: 94% (05-22-22 @ 11:36) (94% - 96%)  Wt(kg): --  I&O's Summary    21 May 2022 07:01  -  22 May 2022 07:00  --------------------------------------------------------  IN: 290 mL / OUT: 0 mL / NET: 290 mL        Appearance: Normal	  HEENT:   Normal oral mucosa, PERRL, EOMI	  Lymphatic: No lymphadenopathy  Cardiovascular: Normal S1 S2, No JVD,   Respiratory: Lungs clear to auscultation	    Gastrointestinal:  Soft, Non-tender, + BS	  pvd  Neurologic: Non-focal  Extremities: weakness r upper ext ?    TELEMETRY: 	    ECG:  	  RADIOLOGY:  OTHER: 	  	  LABS:	 	    CARDIAC MARKERS:                                8.5    9.75  )-----------( 307      ( 21 May 2022 07:20 )             30.5     05-21    137  |  98  |  35<H>  ----------------------------<  167<H>  4.4   |  24  |  5.94<H>    Ca    9.1      21 May 2022 07:23      proBNP:   Lipid Profile:   HgA1c:   TSH:

## 2022-05-22 NOTE — PROGRESS NOTE ADULT - ASSESSMENT
Impression:  This is a 67 year old gentleman pmhx CAD s/p MI/PCI/CABG, hx TIA, afib on rivaroxaban, HTN, DM2, PVD, gout, L CEA 2014, and CKD who presented to [facility] 4/11/22 with ANT, tremors, confusion and lethargy.  CT head no acute changes.  S/p initiation of hemodialysis.  Mental status has improved dramatically.  Pt denies any headaches, no slurred speech, no hemiparesis.  He has chronic gout with bilateral finger swelling, pain.  He also has chronic neuropathy.  Both legs are weak.      Persistent encephalopathy prompted re-consultation on 5/10/22.  MRI brain was ordered and revealed small acute infarctions in bilateral posterior centrum semiovale and left corona radiata and left posterior periventricular white matter.  Carotid ultrasound was performed showing new occlusion of the left internal carotid artery, along with greater than 70% stenosis involving the distal right common carotid artery, and mild to moderate flow-limiting stenosis is seen at the left external carotid artery.  Vascular surgery has been consulted and is recommending CTA.  He continues to be encephalopathic and lethargic.      Diagnosis and Recommendations:  1. At this time suspect delirium may be related to volume depletion. Wife and nursing report no PO intake for days. Hasn't been compliant with medications and missed medication dosing.    -Advise either IV fluids or NGT.   - Convert ASA to chewable/crushed  - Monitor for malnutrition  - Daughter resistant to NGT placement at this time    2. Complete left ICA occlusion and 70% distal CCA stenosis. Has collateral flow via acomm and pcomm but once delirium resolves should be re-evaluated for either CEA or carotid stenting. CEA would be ideal considering issues with medication compliance and potential difficulty adhering to DAPT regimen.    This should not wait for assessment as outpatient if delirium resolves.     3. Reviewed MRI - CVAs in bilateral watershed territory which can potentially contribute to delirium, those these CVAs do not appear particularly large.      - MRA was suboptimal not visualizing the neck but MRA head showed left KESHAWN coming from ACOM and left PCA feeding left MCA.  ICA showed no flow.    - in setting of atrial fibrillation infarcts could potentially also be cardioembolic, continue anticoagulation as per cardiology  - s/p carotid doppler, suboptimal CTA/MRA.  However, it is apparent that there is a proximal left ICA occlusion, which would not be a candidate for intervention.  Intervention for right CCA/ICA stenosis would be high risk but it may indeed by a symptomatic stenosis.   - ECHO in February 2022 did not reveal severe cardiomyopathy, vegetation, or LV thrombus.    - HgA1c of 11 also driving stroke risk.  Goal is < 7  - continue high-intensity statin therapy    4. Discussed with patient, wife, nursing, NP and Dr. Corral. Will follow

## 2022-05-22 NOTE — PROGRESS NOTE ADULT - SUBJECTIVE AND OBJECTIVE BOX
CARDIOLOGY FOLLOW UP - Dr. Mazariegos  Date of Service: 5/22/22  CC: events noted, remains confused, agitated, wife at bedside, tearful    Review of Systems:  Constitutional: No fever, weight loss, or fatigue  Respiratory: No cough, wheezing, or hemoptysis, no shortness of breath  Cardiovascular: No chest pain, palpitations, passing out, dizziness, or leg swelling  Gastrointestinal: No abd or epigastric pain. No nausea, vomiting, or hematemesis; no diarrhea or consiptaiton, no melena or hematochezia  Vascular: No edema     TELEMETRY:    PHYSICAL EXAM:  T(C): 36.7 (05-22-22 @ 04:52), Max: 36.7 (05-21-22 @ 14:11)  HR: 99 (05-22-22 @ 04:52) (84 - 99)  BP: 148/64 (05-22-22 @ 04:52) (110/62 - 154/78)  RR: 18 (05-22-22 @ 04:52) (17 - 18)  SpO2: 95% (05-22-22 @ 04:52) (94% - 96%)  Wt(kg): --  I&O's Summary    21 May 2022 07:01  -  22 May 2022 07:00  --------------------------------------------------------  IN: 290 mL / OUT: 0 mL / NET: 290 mL        Appearance: Normal	  Cardiovascular: Normal S1 S2,RRR, No JVD, No murmurs  Respiratory: Lungs clear to auscultation	  Gastrointestinal:  Soft, Non-tender, + BS	  Extremities: Normal range of motion, No clubbing, cyanosis or edema  Vascular: Peripheral pulses palpable 2+ bilaterally       Home Medications:  allopurinol 100 mg oral tablet: 1 tab(s) orally once a day (03 Apr 2022 06:34)  aspirin 81 mg oral delayed release tablet: 1 tab(s) orally once a day (03 Apr 2022 06:34)  bumetanide 2 mg oral tablet: 1 tab(s) orally once a day (03 Apr 2022 06:34)  insulin glargine 100 units/mL subcutaneous solution: 25 unit(s) subcutaneous once a day (at bedtime) (03 Apr 2022 06:34)  metOLazone 5 mg oral tablet: 1 tab(s) orally 3 times a week (03 Apr 2022 06:34)  metoprolol succinate 50 mg oral tablet, extended release: 2 tab(s) orally once a day (03 Apr 2022 06:34)  NovoLOG 100 units/mL subcutaneous solution: subcutaneous 4 times a day (before meals and at bedtime) (03 Apr 2022 06:34)  NovoLOG FlexPen 100 units/mL injectable solution: 10 unit(s) subcutaneous 3 times a day (03 Apr 2022 06:34)  oxycodone-acetaminophen 5 mg-325 mg oral tablet: 1 tab(s) orally 2 times a day (03 Apr 2022 06:34)  Pradaxa 150 mg oral capsule: 1 cap(s) orally 2 times a day (03 Apr 2022 06:34)  pregabalin 100 mg oral capsule: 1 cap(s) orally 3 times a day (03 Apr 2022 06:34)        MEDICATIONS  (STANDING):  allopurinol 100 milliGRAM(s) Oral daily  apixaban 5 milliGRAM(s) Oral two times a day  aspirin enteric coated 81 milliGRAM(s) Oral daily  atorvastatin 40 milliGRAM(s) Oral at bedtime  chlorhexidine 4% Liquid 1 Application(s) Topical <User Schedule>  dextrose 50% Injectable 25 Gram(s) IV Push once  dextrose 50% Injectable 12.5 Gram(s) IV Push once  dextrose 50% Injectable 25 Gram(s) IV Push once  diltiazem    milliGRAM(s) Oral daily  insulin lispro (ADMELOG) corrective regimen sliding scale   SubCutaneous three times a day before meals  insulin lispro (ADMELOG) corrective regimen sliding scale   SubCutaneous at bedtime  lidocaine   4% Patch 1 Patch Transdermal daily  metoprolol succinate  milliGRAM(s) Oral daily  mupirocin 2% Ointment 1 Application(s) Both Nostrils two times a day  polyethylene glycol 3350 17 Gram(s) Oral two times a day  povidone iodine 10% Solution 1 Application(s) Topical daily  senna 2 Tablet(s) Oral at bedtime  sodium chloride 0.9%. 1000 milliLiter(s) (50 mL/Hr) IV Continuous <Continuous>  sodium chloride 0.9%. 250 milliLiter(s) (250 mL/Hr) IV Continuous <Continuous>        EKG:  RADIOLOGY:  DIAGNOSTIC TESTING:  [ ] Echocardiogram:  [ ] Catherterization:  [ ] Stress Test:  OTHER:     LABS:	 	                          8.5    9.75  )-----------( 307      ( 21 May 2022 07:20 )             30.5     05-21    137  |  98  |  35<H>  ----------------------------<  167<H>  4.4   |  24  |  5.94<H>    Ca    9.1      21 May 2022 07:23            CARDIAC MARKERS:

## 2022-05-22 NOTE — PROGRESS NOTE ADULT - ASSESSMENT
·  Problem:[email or web address] hemodialysis per renal  to cont as per renal  f/u labs      Problem/Plan - 2:  ·  Problem: Chronic atrial fibrillation. c/w a/c  cards f/u     Problem/Plan - 3:  ·  Problem: Cystitis.   ID follow up appreciated        Problem/Plan - 4:  ·  Problem: Type 2 diabetes mellitus. c/w insulin   endo f/u      Problem/Plan - 5:  ·  Problem: CAD (coronary artery disease).   rapid a fib  meds adjusted  hr stable  c/w a/c       Persistent encephalopathy   neuro follow up appreciated  f/u labs   mental status better  polypharmacy contributing   minimizing meds     carotid duplex noted  cta noted  mra/ mri noted  tx plans as per vasc / neuro   neuro to f/u today  vasc to f/u  no plans for intervention now    spoke w/ wife at bedside      rehab

## 2022-05-22 NOTE — PROGRESS NOTE ADULT - SUBJECTIVE AND OBJECTIVE BOX
Subjective:  In bed, lethargic and confused. Wife at bedside.     Medications:  allopurinol 100 milliGRAM(s) Oral daily  apixaban 5 milliGRAM(s) Oral two times a day  aspirin enteric coated 81 milliGRAM(s) Oral daily  atorvastatin 40 milliGRAM(s) Oral at bedtime  bisacodyl 5 milliGRAM(s) Oral every 12 hours PRN  chlorhexidine 4% Liquid 1 Application(s) Topical <User Schedule>  dextrose 50% Injectable 25 Gram(s) IV Push once  dextrose 50% Injectable 12.5 Gram(s) IV Push once  dextrose 50% Injectable 25 Gram(s) IV Push once  dextrose Oral Gel 15 Gram(s) Oral once PRN  diltiazem    milliGRAM(s) Oral daily  insulin lispro (ADMELOG) corrective regimen sliding scale   SubCutaneous at bedtime  insulin lispro (ADMELOG) corrective regimen sliding scale   SubCutaneous three times a day before meals  lidocaine   4% Patch 1 Patch Transdermal daily  metoprolol succinate  milliGRAM(s) Oral daily  mupirocin 2% Ointment 1 Application(s) Both Nostrils two times a day  polyethylene glycol 3350 17 Gram(s) Oral two times a day  povidone iodine 10% Solution 1 Application(s) Topical daily  senna 2 Tablet(s) Oral at bedtime  sodium chloride 0.9% lock flush 10 milliLiter(s) IV Push every 1 hour PRN  sodium chloride 0.9%. 250 milliLiter(s) IV Continuous <Continuous>  sodium chloride 0.9%. 1000 milliLiter(s) IV Continuous <Continuous>  sodium chloride 0.9%. 1000 milliLiter(s) IV Continuous <Continuous>      Labs:  CBC Full  -  ( 21 May 2022 07:20 )  WBC Count : 9.75 K/uL  RBC Count : 3.66 M/uL  Hemoglobin : 8.5 g/dL  Hematocrit : 30.5 %  Platelet Count - Automated : 307 K/uL  Mean Cell Volume : 83.3 fl  Mean Cell Hemoglobin : 23.2 pg  Mean Cell Hemoglobin Concentration : 27.9 gm/dL  Auto Neutrophil # : x  Auto Lymphocyte # : x  Auto Monocyte # : x  Auto Eosinophil # : x  Auto Basophil # : x  Auto Neutrophil % : x  Auto Lymphocyte % : x  Auto Monocyte % : x  Auto Eosinophil % : x  Auto Basophil % : x    05-21    137  |  98  |  35<H>  ----------------------------<  167<H>  4.4   |  24  |  5.94<H>    Ca    9.1      21 May 2022 07:23      CAPILLARY BLOOD GLUCOSE      POCT Blood Glucose.: 234 mg/dL (22 May 2022 12:00)  POCT Blood Glucose.: 171 mg/dL (22 May 2022 07:52)  POCT Blood Glucose.: 151 mg/dL (21 May 2022 20:45)              Vitals:  Vital Signs Last 24 Hrs  T(C): 36.3 (22 May 2022 11:36), Max: 36.7 (21 May 2022 18:11)  T(F): 97.3 (22 May 2022 11:36), Max: 98.1 (21 May 2022 18:11)  HR: 101 (22 May 2022 11:36) (87 - 101)  BP: 146/64 (22 May 2022 11:36) (110/62 - 148/76)  BP(mean): --  RR: 18 (22 May 2022 11:36) (17 - 18)  SpO2: 94% (22 May 2022 11:36) (94% - 95%)          NEUROLOGICAL EXAM:    Mental status: awake, tired, no active distress.  He is oriented to person but does not answer other orientation questions.  He intermittently follows very simple commands and names very simple objects.        Cranial Nerves: Pupils were equal, round, reactive to light. Extraocular movements were intact. B BTT Facial sensation was intact to light touch. There was no facial asymmetry. The palate was upgoing symmetrically and tongue was midline. Hearing acuity was intact to finger rub AU. Shoulder shrug was full bilaterally    Motor exam: Bulk and tone were normal. moves all ext antigravity but drifts to bed.  There is relative right leg weakness in setting of pain which has been previously present.  Fine finger movements were limited by mental status.  There was no pronator drift    Reflexes: DTRs depressed, flexor plantars.     Sensation: Intact to noxious, seems intact to light touch, due to decreased attention did not assess other modalities     Coordination: no gross dysmetria    Gait: deferred    NIHSS: 12    Imaging:    ACC: [id number] EXAM:  CT BRAIN                        PROCEDURE DATE:  04/09/2022      INTERPRETATION:  CLINICAL INFORMATION:  Altered mental status, on   anticoagulation .    TECHNIQUE: Multiple contiguous axial images were acquired from the   skullbase to the vertex without the administration of intravenous   contrast.  Coronal and sagittal reformations were made.    COMPARISON: CT head 10/21/2021, brain MRI 02/23/2014.    FINDINGS:    Scattered lacunar infarcts in the bilateral cerebralhemispheres are   grossly stable compared to the 10/21/2021 head CT. No new additional   infarcts are noted over the time interval.    No acute intracranial hemorrhage, mass effect, or midline shift. The   brain demonstrates periventricular hypoattenuation, nonspecific in   etiology but likely representing chronic microvascular ischemic changes.    No abnormal extra-axial fluid collections are present.    The ventricles and sulci demonstrate age appropriate involutional   changes.  The basal cisterns are patent.    The orbits are unremarkable. The paranasal sinuses are clear. The mastoid   air cells are clear. The calvarium is intact.    IMPRESSION:    No acute intracranial abnormality is noted. If the patient has new and   persistent symptoms, consider short interval follow-up head CT or brain   MRI.    --- End of Report ---    GATITO VILLARREAL MD; Resident Radiologist  This document has been electronically signed.  JESSE CORONA MD; Attending Radiologist  This document has been electronically signed. Apr 9 2022  2:00PM        ACC: [id number] EXAM:  MR BRAIN                          PROCEDURE DATE:  05/11/2022          INTERPRETATION:  MR brain  without gadolinium    CLINICAL INFORMATION: Stroke.    TECHNIQUE: Limited Sagittal T1-weighted images, axial FLAIR images, and   axial diffusion weighted images of the brain were obtained.  Patient was   unable to hold still for remaining sequences.    FINDINGS:   MRI dated 02/23/2014 available for review.    The brain demonstrates small acute infarctions in the BILATERAL posterior   centrum semiovale and LEFT corona radiata and LEFT posterior   periventricular white matter with restricted diffusion. No intracranial   hemorrhage is recognized. No mass effect is found in the brain.    The ventricles, sulci and basal cisterns show mild global atrophy most   prominent within the BILATERAL cerebellum.    The vertebral and internal carotid arteries demonstrate expected flow   voids indicating their patency.    The orbits are unremarkable.  The paranasal sinuses are clear.  The nasal   cavity appears intact.  The nasopharynx is symmetric.  The central skull   base and temporal bones are intact.  The calvarium appears unremarkable.    IMPRESSION: Small acute infarctions in the BILATERAL posterior centrum   semiovale and LEFT corona radiata and LEFT posterior periventricular   white matter with restricted diffusion.   mild global atrophy most   prominent within the BILATERAL cerebellum.    --- End of Report ---            JESÚS PADGETT MD; Attending Radiologist  This document has been electronically signed. May 11 2022  5:47PM        Patient name: DYLAN DEL CASTILLO  YOB: 1955   Age: 67 (M)   MR#: [id number]  Study Date: 2/22/2022  [facility]  Study quality: Technically difficult  Referring Physician: Mendoza Corral MD  Blood Pressure: 152/82 mmHg  Height: 180 cm  Weight: 136 kg  BSA: 2.5 m2  ------------------------------------------------------------------------  PROCEDURE: Transthoracic echocardiogram with 2-D, M-Mode  and complete spectral and color flow Doppler. Verbal  consent was obtained for injection of  Ultrasonic Enhancing  Agent following a discussion of risks and benefits.  Following intravenous injection of Ultrasonic Enhancing  Agent , harmonic imaging was performed.  INDICATION: Chest pain, unspecified (R07.9)  ------------------------------------------------------------------------  Dimensions:    Normal Values:  LA:     4.4    2.0 - 4.0 cm  Ao:     4.0    2.0 - 3.8 cm  SEPTUM: 1.1    0.6 - 1.2 cm  PWT:    0.9    0.6 - 1.1 cm  LVIDd:  5.0    3.0 - 5.6 cm  LVIDs:  3.5    1.8 - 4.0 cm  Derived variables:  LVMI: 73 g/m2  RWT: 0.36  EF (Visual Estimate): 60 %  Doppler Peak Velocity (m/sec): AoV=1.5 TV=2.2  ------------------------------------------------------------------------  Observations:  Mitral Valve: Normal mitral valve.  Aortic Valve/Aorta: Calcified aortic valve with normal  opening. Minimal aortic regurgitation.  Normal aortic root size.  Left Atrium: Mildly dilated left atrium.  Left Ventricle: Endocardial visualization enhanced with  intravenous injection of Ultrasonic Enhancing Agent  (Definity).  Normal left ventricular internal dimensions and wall  thickness.  Normal left ventricular systolic function. No segmental  wall motion abnormalities.  Right Heart: Normal right atrium. Normal right ventricular  size and function.  Normal tricuspid valve. Mild tricuspid regurgitation.  Normal pulmonic valve. Mild pulmonic regurgitation.  Pericardium/Pleura: Normal pericardium with no pericardial  effusion.  Hemodynamic: No evidence of pulmonary hypertension.  ------------------------------------------------------------------------  Conclusions:  Endocardial visualization enhanced with intravenous  injection of Ultrasonic Enhancing Agent (Definity).  Normal left ventricular systolic function. No segmental  wall motion abnormalities.  ------------------------------------------------------------------------  Confirmed on  2/22/2022 - 16:17:39 by Ishan Ackerman MD, FASE  ------------------------------------------------------------------------    < from: CT Angio Head w/ IV Cont (05.12.22 @ 13:34) >    ACC: [id number] EXAM:  CT ANGIO NECK (W)[facility]                        ACC: [id number] EXAM:  CT ANGIO BRAIN (W)[facility]                          PROCEDURE DATE:  05/12/2022          INTERPRETATION:  CT angiography of the brain and neck    CLINICAL INDICATION: Recent infarcts. Carotid stenosis.    TECHNIQUE: Direct axial CT scanning of the brain and neck was obtained   from the vertex to the level of the clavicular heads after the dynamic   intravenous injection of 44 cc of Omnipaque 300. 0 cc were discarded.   Sagittal and coronal maximum intensity projection reformats were   provided.  Three-dimensional reconstructions were performed by the   radiologist using the ASSET4 workstation.    Additionally, noncontrast axial CT scanning of the brain was obtained   from the skull base to the vertex. Sagittal and coronal reformats were   provided.    COMPARISON: MRA neck 10/24/2021    FINDINGS:    CT brain:    Previously seen acute infarcts in the bilateral cerebral hemispheres are   redemonstrated. No CT evidence for hemorrhagic transformation.    No hydrocephalus, midline shift, or effacement of basal cisterns.    No acute intracranial hemorrhage or vasogenic edema.    Mild to moderate white matter microvascular ischemic disease.    CTA brain:    Limited by relatively decreased opacification of the arteries.    Multifocal narrowing of the right skull base ICA due to calcified plaque,   the degree of which is not well evaluated.    Normal flow-related enhancement of the supraclinoid right ICA.    Lack of flow related enhancement of the petrous, precavernous, and   cavernous left ICA. Reconstitution of the vessel at its supraclinoid   segment.    Otherwise no flow-limiting stenosis.    Normal flow-related enhancement of the bilateral anterior, middle, and   posterior cerebral arteries.    Normal flow-related enhancement of the intradural vertebral arteries and   basilar artery.    No vascular aneurysm or AVM.    CTA neck:    Stenoses described in this report are on the basis of NASCET criteria.    Study is significantly limited by relatively decreased opacification of   the arteries.    Short segment stenosis of the mid left common carotid artery due to the   presence of partially calcified plaque is poorly evaluated. Flow-related   enhancement of the more proximal and distal left common carotid artery is   noted.    Long segment stenosis of the mid and distal right common carotid artery   due to the presence of partially calcified plaque is poorly evaluated.    Rapidly progressive stenosis of the left internal carotid artery   beginning at its origin. No flow related enhancement of the vessel beyond   its origin.    Normal flow-related enhancement of the bilateral vertebral arteries.    No gross evidence for acute arterial dissection.    IMPRESSION:    CTA brain:  Limited by relatively decreased opacification of the arteries.    Multifocal narrowing of the right skull base ICA due to calcified plaque,   the degree of which is not well evaluated.    Lack of flow related enhancement of the petrous, precavernous, and   cavernous left ICA. Reconstitution of the vessel at its supraclinoid   segment.    Otherwise no flow-limiting stenosis.    No vascular aneurysm or AVM.    CTA neck:  Limited by relatively decreased opacification of the arteries.    Short segment stenosis of the mid left common carotid artery due to the   presence of partially calcified plaque is poorly evaluated. Flow-related   enhancement of the more proximal and distal left common carotid artery is   noted.    Long segment stenosis of the mid and distal right common carotid artery   due to the presence of partially calcified plaque is poorly evaluated.    Rapidly progressive stenosis of the left internal carotid artery   beginning at its origin. No flow related enhancement of the vessel beyond   its origin.    Recommend correlation with contrast-enhanced MRI of the neck for further   evaluation.    --- End of Report ---            MIGUEL ANGEL CARRILLO MD; Attending Radiologist  This document has been electronically signed. May 12 2022  2:47PM    < end of copied text >

## 2022-05-23 NOTE — PROGRESS NOTE ADULT - ASSESSMENT
67M PMH of CAD, NSTEMI s/p 3 stents in 2014 (stents to LAD, proximal and distal LCx), ischemic cardiomyopathy, HTN for 10 years, T2DM for 26 years c/b peripheral neuropathy, CVA, TIA, Afib on Pradaxa, chronic RLE wound, L carotid stenosis s/p endarterectomy (2014) just recently admitted  to the [facility] on 2/19/2022 with acute onset chest pain for one day found to have an NSTEMI, CAD, s/p PCI in setting of increased AF rates off bb for 3 days and resolved chest pain, his CKMB peaked and Echo noted with EF 60%,normal LV function, mild TR, mild AK. He was s/p LHC with 85% proximal LAD s/p balloon angioplasty and LULU. He presented to [facility] ED with decreased urine output over the week. Carotid duplex showed Calcified atherosclerotic disease seen throughout the carotid systems. There is new occlusion of the left internal carotid artery. There is greater than 70% stenosis involving the distal right common carotid artery as was seen previously. Mild to moderate flow-limiting stenosis is seen at the left external carotid artery. Vascular surgery consulted for carotid stenosis. Pt currently denies vision changes, dizziness, lightheadedness or any other symptoms at this point in time.     PLAN:   asked to re eval pt by DR Corral  initial  vascular surgery  decision and plan were made based on the assesment by neurology service at that time   [facility] neurosurg is already involved  recommend clarification of all  current  complex  neuro issues and risk benefit  of med/conserv managemnt  vs intervention   by neurology  service and neurosurg service to pt and family prior to proceeding w any intervention   if neurosurg decides to not proceed w carotid angio or intervention i will be happy to  continue in the care of the patient however since neurosurg is involved and already has a plan  care as per neurosurg i will reconsult prn   Please note the  stenosis in question is on the RIGHT CCA

## 2022-05-23 NOTE — CHART NOTE - NSCHARTNOTEFT_GEN_A_CORE
Nutrition Follow Up Note  Patient seen for: follow up on picu    Chart reviewed, events noted.    : "Stanley Garcia at [place] [phone]. They do not have BC auth. Replied to [facility]  message with the following: " note is neurology note.  nephrology note  attached.  CBC attached. I see your observation "see lethargic, with  delirium, no PO/Med intake last few days" are you requesting any documentation  on this? Fouzia was sending the documents to dialysis for review on Friday."  Social work to continue to follow."    Source: [x] Patient       [x] EMR        [] RN        [] Family at bedside       [] Other:    -If unable to interview patient: [] Trach/Vent/BiPAP  [] Disoriented/confused/inappropriate to interview    Diet Order:   Diet, Consistent Carbohydrate Renal/No Snacks:   Supplement Feeding Modality:  Oral  Nepro Cans or Servings Per Day:  1       Frequency:  Daily (22)    - Is current order appropriate/adequate? [x] Yes  []  No:     - PO intake meals :   [x] >75%  Adequate    [] 50-75%  Fair       [] <50%  Poor  - PO intake of supplements if pt receiving: [x]>75% []50% []25%   as per   []flow sheet  [x]patient  []family/aide  []PCA  []Nurse  []RD observation    - Nutrition-related concerns: none at this time -nutrition needs being addressed    pt seen by SLP []Yes [x]No    GI:  Last BM ___.   Bowel Regimen? [x] Yes: Dulcolax, Miralax  [] No      Weights:   Daily Weight in k.5 (-), Weight in k.5 ()    Nutritionally Pertinent MEDICATIONS  (STANDING):  allopurinol  atorvastatin  dextrose 50% Injectable  dextrose 50% Injectable  dextrose 50% Injectable  diltiazem   CD  insulin lispro (ADMELOG) corrective regimen sliding scale  insulin lispro (ADMELOG) corrective regimen sliding scale  metoprolol succinate ER  polyethylene glycol 3350  senna  sodium chloride 0.9%.  sodium chloride 0.9%.  sodium chloride 0.9%.    Pertinent Labs:   A1C with Estimated Average Glucose Result: 10.1 % (22 @ 12:18)    Finger Sticks:  POCT Blood Glucose.: 194 mg/dL ( @ 08:25)  POCT Blood Glucose.: 209 mg/dL ( @ 20:48)  POCT Blood Glucose.: 196 mg/dL ( @ 17:01)  POCT Blood Glucose.: 234 mg/dL ( @ 12:00)      Pressure Injuries as per nursing documentation: none  Edema: +1 right foot    Estimated Needs:   [x] no change since previous assessment  [] recalculated:   Estimated  Energy Needs:  78.2kg x 30-35kcal/oc=0261-9619latf/day  Estimated Protein Needs:78.2 x 1.2-1.4g/kg= 93.8-109.5g/day  Estimated Fluid Needs: deferred to team   IBW (78.2kg) was used for energy and protein calculations.    Previous Nutrition Diagnosis:  Inadequate oral intake, Increased nutrient needs; Severe acute malnutrition     Nutrition Diagnosis is: [x] ongoing  [] resolved [] not applicable     New Nutrition Diagnosis: [x] Not applicable    Nutrition Care Plan:  [] In Progress  [x] Achieved  [] Not applicable    Nutrition Interventions:     Education Provided:       [] Yes:  [x] No:   pt was educated on the importance of supplements to increase calorie and protein intake in light of RD's nutritional findings [x]Yes []N/A         Recommendations:         [x] Continue current diet order     [] Change diet to:      [x] continue oral nutrition supplement: Nepro x 1 daily        [x] Add micronutrient supplementation: Nephro-Cheyanne Daily     [] Continue current micronutrient supplementation:        [x]Discussed recommendations with provider     [] Needed to escalate to provider     []Placed pending verification with NP/PA      []Placed pending verification with Team      []Placed sticker (malnutrition/BMI/underweight)     []Recommend swallow evaluation     [] monitor need for diet ed reinforcement     [] Other:     Monitoring and Evaluation:   Continue to monitor nutritional intake, tolerance to diet prescription, weights, labs, skin integrity      RD remains available upon request and will follow up per protocol  Hoa Boo MA, RD, CDN #[phone]

## 2022-05-23 NOTE — PROGRESS NOTE ADULT - NS ATTEND AMEND GEN_ALL_CORE FT
he is orthostatic I am uncertain if due to volume depletion vs due to being severely deconditioned.   hd in am   check cbc/cmp/phos /mag/pth

## 2022-05-23 NOTE — PROGRESS NOTE ADULT - SUBJECTIVE AND OBJECTIVE BOX
CARDIOLOGY FOLLOW UP - Dr. Mazariegos  DATE OF SERVICE: 5/23/22     CC no cp  or sob      REVIEW OF SYSTEMS:  CONSTITUTIONAL: No fever, weight loss, or fatigue  RESPIRATORY: No cough, wheezing, chills or hemoptysis; No Shortness of Breath  CARDIOVASCULAR: No chest pain, palpitations, passing out, dizziness, or leg swelling  GASTROINTESTINAL: No abdominal or epigastric pain. No nausea, vomiting, or hematemesis; No diarrhea or constipation. No melena or hematochezia.  VASCULAR: No edema     PHYSICAL EXAM:  T(C): 36.3 (05-23-22 @ 04:10), Max: 36.4 (05-22-22 @ 20:10)  HR: 88 (05-23-22 @ 04:10) (88 - 101)  BP: 145/65 (05-23-22 @ 04:10) (136/64 - 146/64)  RR: 18 (05-23-22 @ 04:10) (18 - 18)  SpO2: 93% (05-23-22 @ 04:10) (93% - 95%)  Wt(kg): --  I&O's Summary    22 May 2022 07:01  -  23 May 2022 07:00  --------------------------------------------------------  IN: 600 mL / OUT: 0 mL / NET: 600 mL        Appearance: Normal	  Cardiovascular: Normal S1 S2, irreg  Respiratory: Lungs clear to auscultation	  Gastrointestinal:  Soft, Non-tender, + BS	  Extremities: Normal range of motion, No clubbing, cyanosis or edema      Home Medications:  allopurinol 100 mg oral tablet: 1 tab(s) orally once a day (03 Apr 2022 06:34)  aspirin 81 mg oral delayed release tablet: 1 tab(s) orally once a day (03 Apr 2022 06:34)  bumetanide 2 mg oral tablet: 1 tab(s) orally once a day (03 Apr 2022 06:34)  insulin glargine 100 units/mL subcutaneous solution: 25 unit(s) subcutaneous once a day (at bedtime) (03 Apr 2022 06:34)  metOLazone 5 mg oral tablet: 1 tab(s) orally 3 times a week (03 Apr 2022 06:34)  metoprolol succinate 50 mg oral tablet, extended release: 2 tab(s) orally once a day (03 Apr 2022 06:34)  NovoLOG 100 units/mL subcutaneous solution: subcutaneous 4 times a day (before meals and at bedtime) (03 Apr 2022 06:34)  NovoLOG FlexPen 100 units/mL injectable solution: 10 unit(s) subcutaneous 3 times a day (03 Apr 2022 06:34)  oxycodone-acetaminophen 5 mg-325 mg oral tablet: 1 tab(s) orally 2 times a day (03 Apr 2022 06:34)  Pradaxa 150 mg oral capsule: 1 cap(s) orally 2 times a day (03 Apr 2022 06:34)  pregabalin 100 mg oral capsule: 1 cap(s) orally 3 times a day (03 Apr 2022 06:34)      MEDICATIONS  (STANDING):  allopurinol 100 milliGRAM(s) Oral daily  apixaban 5 milliGRAM(s) Oral two times a day  aspirin  chewable 81 milliGRAM(s) Oral daily  atorvastatin 40 milliGRAM(s) Oral at bedtime  chlorhexidine 4% Liquid 1 Application(s) Topical <User Schedule>  dextrose 50% Injectable 25 Gram(s) IV Push once  dextrose 50% Injectable 12.5 Gram(s) IV Push once  dextrose 50% Injectable 25 Gram(s) IV Push once  diltiazem    milliGRAM(s) Oral daily  insulin lispro (ADMELOG) corrective regimen sliding scale   SubCutaneous three times a day before meals  insulin lispro (ADMELOG) corrective regimen sliding scale   SubCutaneous at bedtime  lidocaine   4% Patch 1 Patch Transdermal daily  metoprolol succinate  milliGRAM(s) Oral daily  mupirocin 2% Ointment 1 Application(s) Both Nostrils two times a day  polyethylene glycol 3350 17 Gram(s) Oral two times a day  povidone iodine 10% Solution 1 Application(s) Topical daily  senna 2 Tablet(s) Oral at bedtime  sodium chloride 0.9%. 1000 milliLiter(s) (40 mL/Hr) IV Continuous <Continuous>  sodium chloride 0.9%. 1000 milliLiter(s) (50 mL/Hr) IV Continuous <Continuous>  sodium chloride 0.9%. 250 milliLiter(s) (250 mL/Hr) IV Continuous <Continuous>      TELEMETRY: 	afib hr 90s    ECG:  	  RADIOLOGY:   DIAGNOSTIC TESTING:  [ ] Echocardiogram:  [ ]  Catheterization:  [ ] Stress Test:    OTHER: 	    LABS:

## 2022-05-23 NOTE — PROGRESS NOTE ADULT - SUBJECTIVE AND OBJECTIVE BOX
Subjective:  In bed, awake but disoriented    Medications:  allopurinol 100 milliGRAM(s) Oral daily  apixaban 5 milliGRAM(s) Oral two times a day  aspirin  chewable 81 milliGRAM(s) Oral daily  atorvastatin 40 milliGRAM(s) Oral at bedtime  bisacodyl 5 milliGRAM(s) Oral every 12 hours PRN  chlorhexidine 4% Liquid 1 Application(s) Topical <User Schedule>  dextrose 50% Injectable 25 Gram(s) IV Push once  dextrose 50% Injectable 25 Gram(s) IV Push once  dextrose 50% Injectable 12.5 Gram(s) IV Push once  dextrose Oral Gel 15 Gram(s) Oral once PRN  diltiazem    milliGRAM(s) Oral daily  insulin lispro (ADMELOG) corrective regimen sliding scale   SubCutaneous three times a day before meals  insulin lispro (ADMELOG) corrective regimen sliding scale   SubCutaneous at bedtime  lidocaine   4% Patch 1 Patch Transdermal daily  metoprolol succinate  milliGRAM(s) Oral daily  mupirocin 2% Ointment 1 Application(s) Both Nostrils two times a day  polyethylene glycol 3350 17 Gram(s) Oral two times a day  povidone iodine 10% Solution 1 Application(s) Topical daily  senna 2 Tablet(s) Oral at bedtime  sodium chloride 0.9% lock flush 10 milliLiter(s) IV Push every 1 hour PRN  sodium chloride 0.9%. 1000 milliLiter(s) IV Continuous <Continuous>  sodium chloride 0.9%. 250 milliLiter(s) IV Continuous <Continuous>  sodium chloride 0.9%. 1000 milliLiter(s) IV Continuous <Continuous>      Labs:          CAPILLARY BLOOD GLUCOSE      POCT Blood Glucose.: 194 mg/dL (23 May 2022 08:25)  POCT Blood Glucose.: 209 mg/dL (22 May 2022 20:48)  POCT Blood Glucose.: 196 mg/dL (22 May 2022 17:01)  POCT Blood Glucose.: 234 mg/dL (22 May 2022 12:00)              Vitals:  Vital Signs Last 24 Hrs  T(C): 36.3 (23 May 2022 04:10), Max: 36.4 (22 May 2022 20:10)  T(F): 97.4 (23 May 2022 04:10), Max: 97.5 (22 May 2022 20:10)  HR: 88 (23 May 2022 04:10) (88 - 101)  BP: 145/65 (23 May 2022 04:10) (136/64 - 146/64)  BP(mean): --  RR: 18 (23 May 2022 04:10) (18 - 18)  SpO2: 93% (23 May 2022 04:10) (93% - 95%)    NEUROLOGICAL EXAM:    Mental status: awake, tired, no active distress.  He is oriented to person, knew hospital and year, not president.  He intermittently follows very simple commands and names very simple objects.        Cranial Nerves: Pupils were equal, round, reactive to light. Extraocular movements were intact. B BTT Facial sensation was intact to light touch. There was no facial asymmetry. The palate was upgoing symmetrically and tongue was midline. Hearing acuity was intact to finger rub AU. Shoulder shrug was full bilaterally    Motor exam: Bulk and tone were normal. moves all ext antigravity but drifts to bed.  There is relative right leg weakness in setting of pain which has been previously present.  Fine finger movements were limited by mental status.  There was no pronator drift    Reflexes: DTRs depressed, flexor plantars.     Sensation: Intact to noxious, seems intact to light touch, due to decreased attention did not assess other modalities     Coordination: no gross dysmetria    Gait: deferred    NIHSS: 12    Imaging:    ACC: [id number] EXAM:  CT BRAIN                        PROCEDURE DATE:  04/09/2022      INTERPRETATION:  CLINICAL INFORMATION:  Altered mental status, on   anticoagulation .    TECHNIQUE: Multiple contiguous axial images were acquired from the   skullbase to the vertex without the administration of intravenous   contrast.  Coronal and sagittal reformations were made.    COMPARISON: CT head 10/21/2021, brain MRI 02/23/2014.    FINDINGS:    Scattered lacunar infarcts in the bilateral cerebralhemispheres are   grossly stable compared to the 10/21/2021 head CT. No new additional   infarcts are noted over the time interval.    No acute intracranial hemorrhage, mass effect, or midline shift. The   brain demonstrates periventricular hypoattenuation, nonspecific in   etiology but likely representing chronic microvascular ischemic changes.    No abnormal extra-axial fluid collections are present.    The ventricles and sulci demonstrate age appropriate involutional   changes.  The basal cisterns are patent.    The orbits are unremarkable. The paranasal sinuses are clear. The mastoid   air cells are clear. The calvarium is intact.    IMPRESSION:    No acute intracranial abnormality is noted. If the patient has new and   persistent symptoms, consider short interval follow-up head CT or brain   MRI.    --- End of Report ---    GATITO VILLARREAL MD; Resident Radiologist  This document has been electronically signed.  JESSE CORONA MD; Attending Radiologist  This document has been electronically signed. Apr 9 2022  2:00PM        ACC: [id number] EXAM:  MR BRAIN                          PROCEDURE DATE:  05/11/2022          INTERPRETATION:  MR brain  without gadolinium    CLINICAL INFORMATION: Stroke.    TECHNIQUE: Limited Sagittal T1-weighted images, axial FLAIR images, and   axial diffusion weighted images of the brain were obtained.  Patient was   unable to hold still for remaining sequences.    FINDINGS:   MRI dated 02/23/2014 available for review.    The brain demonstrates small acute infarctions in the BILATERAL posterior   centrum semiovale and LEFT corona radiata and LEFT posterior   periventricular white matter with restricted diffusion. No intracranial   hemorrhage is recognized. No mass effect is found in the brain.    The ventricles, sulci and basal cisterns show mild global atrophy most   prominent within the BILATERAL cerebellum.    The vertebral and internal carotid arteries demonstrate expected flow   voids indicating their patency.    The orbits are unremarkable.  The paranasal sinuses are clear.  The nasal   cavity appears intact.  The nasopharynx is symmetric.  The central skull   base and temporal bones are intact.  The calvarium appears unremarkable.    IMPRESSION: Small acute infarctions in the BILATERAL posterior centrum   semiovale and LEFT corona radiata and LEFT posterior periventricular   white matter with restricted diffusion.   mild global atrophy most   prominent within the BILATERAL cerebellum.    --- End of Report ---            JESÚS PADGETT MD; Attending Radiologist  This document has been electronically signed. May 11 2022  5:47PM        Patient name: DYLAN DEL CASTILLO  YOB: 1955   Age: 67 (M)   MR#: [id number]  Study Date: 2/22/2022  [facility]  Study quality: Technically difficult  Referring Physician: Mendoza Corral MD  Blood Pressure: 152/82 mmHg  Height: 180 cm  Weight: 136 kg  BSA: 2.5 m2  ------------------------------------------------------------------------  PROCEDURE: Transthoracic echocardiogram with 2-D, M-Mode  and complete spectral and color flow Doppler. Verbal  consent was obtained for injection of  Ultrasonic Enhancing  Agent following a discussion of risks and benefits.  Following intravenous injection of Ultrasonic Enhancing  Agent , harmonic imaging was performed.  INDICATION: Chest pain, unspecified (R07.9)  ------------------------------------------------------------------------  Dimensions:    Normal Values:  LA:     4.4    2.0 - 4.0 cm  Ao:     4.0    2.0 - 3.8 cm  SEPTUM: 1.1    0.6 - 1.2 cm  PWT:    0.9    0.6 - 1.1 cm  LVIDd:  5.0    3.0 - 5.6 cm  LVIDs:  3.5    1.8 - 4.0 cm  Derived variables:  LVMI: 73 g/m2  RWT: 0.36  EF (Visual Estimate): 60 %  Doppler Peak Velocity (m/sec): AoV=1.5 TV=2.2  ------------------------------------------------------------------------  Observations:  Mitral Valve: Normal mitral valve.  Aortic Valve/Aorta: Calcified aortic valve with normal  opening. Minimal aortic regurgitation.  Normal aortic root size.  Left Atrium: Mildly dilated left atrium.  Left Ventricle: Endocardial visualization enhanced with  intravenous injection of Ultrasonic Enhancing Agent  (Definity).  Normal left ventricular internal dimensions and wall  thickness.  Normal left ventricular systolic function. No segmental  wall motion abnormalities.  Right Heart: Normal right atrium. Normal right ventricular  size and function.  Normal tricuspid valve. Mild tricuspid regurgitation.  Normal pulmonic valve. Mild pulmonic regurgitation.  Pericardium/Pleura: Normal pericardium with no pericardial  effusion.  Hemodynamic: No evidence of pulmonary hypertension.  ------------------------------------------------------------------------  Conclusions:  Endocardial visualization enhanced with intravenous  injection of Ultrasonic Enhancing Agent (Definity).  Normal left ventricular systolic function. No segmental  wall motion abnormalities.  ------------------------------------------------------------------------  Confirmed on  2/22/2022 - 16:17:39 by Ishan Ackerman MD, FASE  ------------------------------------------------------------------------    < from: CT Angio Head w/ IV Cont (05.12.22 @ 13:34) >    ACC: [id number] EXAM:  CT ANGIO NECK (W)[facility]                        ACC: [id number] EXAM:  CT ANGIO BRAIN (W)[facility]                          PROCEDURE DATE:  05/12/2022          INTERPRETATION:  CT angiography of the brain and neck    CLINICAL INDICATION: Recent infarcts. Carotid stenosis.    TECHNIQUE: Direct axial CT scanning of the brain and neck was obtained   from the vertex to the level of the clavicular heads after the dynamic   intravenous injection of 44 cc of Omnipaque 300. 0 cc were discarded.   Sagittal and coronal maximum intensity projection reformats were   provided.  Three-dimensional reconstructions were performed by the   radiologist using the [employer] workstation.    Additionally, noncontrast axial CT scanning of the brain was obtained   from the skull base to the vertex. Sagittal and coronal reformats were   provided.    COMPARISON: MRA neck 10/24/2021    FINDINGS:    CT brain:    Previously seen acute infarcts in the bilateral cerebral hemispheres are   redemonstrated. No CT evidence for hemorrhagic transformation.    No hydrocephalus, midline shift, or effacement of basal cisterns.    No acute intracranial hemorrhage or vasogenic edema.    Mild to moderate white matter microvascular ischemic disease.    CTA brain:    Limited by relatively decreased opacification of the arteries.    Multifocal narrowing of the right skull base ICA due to calcified plaque,   the degree of which is not well evaluated.    Normal flow-related enhancement of the supraclinoid right ICA.    Lack of flow related enhancement of the petrous, precavernous, and   cavernous left ICA. Reconstitution of the vessel at its supraclinoid   segment.    Otherwise no flow-limiting stenosis.    Normal flow-related enhancement of the bilateral anterior, middle, and   posterior cerebral arteries.    Normal flow-related enhancement of the intradural vertebral arteries and   basilar artery.    No vascular aneurysm or AVM.    CTA neck:    Stenoses described in this report are on the basis of NASCET criteria.    Study is significantly limited by relatively decreased opacification of   the arteries.    Short segment stenosis of the mid left common carotid artery due to the   presence of partially calcified plaque is poorly evaluated. Flow-related   enhancement of the more proximal and distal left common carotid artery is   noted.    Long segment stenosis of the mid and distal right common carotid artery   due to the presence of partially calcified plaque is poorly evaluated.    Rapidly progressive stenosis of the left internal carotid artery   beginning at its origin. No flow related enhancement of the vessel beyond   its origin.    Normal flow-related enhancement of the bilateral vertebral arteries.    No gross evidence for acute arterial dissection.    IMPRESSION:    CTA brain:  Limited by relatively decreased opacification of the arteries.    Multifocal narrowing of the right skull base ICA due to calcified plaque,   the degree of which is not well evaluated.    Lack of flow related enhancement of the petrous, precavernous, and   cavernous left ICA. Reconstitution of the vessel at its supraclinoid   segment.    Otherwise no flow-limiting stenosis.    No vascular aneurysm or AVM.    CTA neck:  Limited by relatively decreased opacification of the arteries.    Short segment stenosis of the mid left common carotid artery due to the   presence of partially calcified plaque is poorly evaluated. Flow-related   enhancement of the more proximal and distal left common carotid artery is   noted.    Long segment stenosis of the mid and distal right common carotid artery   due to the presence of partially calcified plaque is poorly evaluated.    Rapidly progressive stenosis of the left internal carotid artery   beginning at its origin. No flow related enhancement of the vessel beyond   its origin.    Recommend correlation with contrast-enhanced MRI of the neck for further   evaluation.    --- End of Report ---            MIGUEL ANGEL CARRILLO MD; Attending Radiologist  This document has been electronically signed. May 12 2022  2:47PM    < end of copied text >

## 2022-05-23 NOTE — PROGRESS NOTE ADULT - PROBLEM SELECTOR PLAN 2
I Edward Pinto MD have personally  seen and examined the patient today and have noted the findings and formulated the plan of care.  I Edward Pinto MD have personally seen and examined the patient at bedside today a 4pm

## 2022-05-23 NOTE — PROGRESS NOTE ADULT - SUBJECTIVE AND OBJECTIVE BOX
DATE OF SERVICE: 05-23-22 @ 11:29  CHIEF COMPLAINT:Patient is a 67y old  Male who presents with a chief complaint of Decreased urine output for the past week, leg oedema (23 May 2022 10:42)    	        PAST MEDICAL & SURGICAL HISTORY:  HTN (hypertension), benign      HLD (hyperlipidemia)      DM type 2 (diabetes mellitus, type 2)      TIA (transient ischemic attack)      Atrial fibrillation      MI (myocardial infarction)  circa 2014 and 2022.      CAD (coronary artery disease)      Neuropathy      Stage 3 chronic kidney disease      2019 novel coronavirus disease (COVID-19)  12/2021.  Received the COVID-19 vaccine x 2 as of April 2022.      Status post angioplasty with stent  LULU x 3 2/7/2014      S/P carotid endarterectomy  left              awake  alert   no cp or sob  no n/v  mental status better    Medications:  MEDICATIONS  (STANDING):  allopurinol 100 milliGRAM(s) Oral daily  apixaban 5 milliGRAM(s) Oral two times a day  aspirin  chewable 81 milliGRAM(s) Oral daily  atorvastatin 40 milliGRAM(s) Oral at bedtime  chlorhexidine 4% Liquid 1 Application(s) Topical <User Schedule>  dextrose 50% Injectable 25 Gram(s) IV Push once  dextrose 50% Injectable 12.5 Gram(s) IV Push once  dextrose 50% Injectable 25 Gram(s) IV Push once  diltiazem    milliGRAM(s) Oral daily  insulin lispro (ADMELOG) corrective regimen sliding scale   SubCutaneous at bedtime  insulin lispro (ADMELOG) corrective regimen sliding scale   SubCutaneous three times a day before meals  lidocaine   4% Patch 1 Patch Transdermal daily  metoprolol succinate  milliGRAM(s) Oral daily  mupirocin 2% Ointment 1 Application(s) Both Nostrils two times a day  polyethylene glycol 3350 17 Gram(s) Oral two times a day  povidone iodine 10% Solution 1 Application(s) Topical daily  senna 2 Tablet(s) Oral at bedtime  sodium chloride 0.9%. 1000 milliLiter(s) (50 mL/Hr) IV Continuous <Continuous>  sodium chloride 0.9%. 250 milliLiter(s) (250 mL/Hr) IV Continuous <Continuous>  sodium chloride 0.9%. 1000 milliLiter(s) (40 mL/Hr) IV Continuous <Continuous>    MEDICATIONS  (PRN):  bisacodyl 5 milliGRAM(s) Oral every 12 hours PRN Constipation  dextrose Oral Gel 15 Gram(s) Oral once PRN Blood Glucose LESS THAN 70 milliGRAM(s)/deciliter  sodium chloride 0.9% lock flush 10 milliLiter(s) IV Push every 1 hour PRN Pre/post blood products, medications, blood draw, and to maintain line patency    	    PHYSICAL EXAM:  T(C): 36.3 (05-23-22 @ 04:10), Max: 36.4 (05-22-22 @ 20:10)  HR: 88 (05-23-22 @ 04:10) (88 - 101)  BP: 145/65 (05-23-22 @ 04:10) (136/64 - 146/64)  RR: 18 (05-23-22 @ 04:10) (18 - 18)  SpO2: 93% (05-23-22 @ 04:10) (93% - 95%)  Wt(kg): --  I&O's Summary    22 May 2022 07:01  -  23 May 2022 07:00  --------------------------------------------------------  IN: 600 mL / OUT: 0 mL / NET: 600 mL          Cardiovascular: reg irreg  Respiratory: Lungs clear to auscultation	    Gastrointestinal:  Soft, Non-tender, + BS	    Neurologic: Non-focal  Extremities: dec rom   pvd    TELEMETRY: 	    ECG:  	  RADIOLOGY:  OTHER: 	  	  LABS:	 	    CARDIAC MARKERS:                  proBNP:   Lipid Profile:   HgA1c:   TSH:

## 2022-05-23 NOTE — CONSULT NOTE ADULT - ASSESSMENT
67M, CVA w/o deficits s/p 2014 Left CEA w/ Vasc Surg Dr. Pinto, CAD, T2DM, AF. On ASA81, Eliquis. Admitted 4/3 for lethargy, found to have renal failure, now on HD. CTA on 5/12 shows Left ICA proximal occlusion with distal IC reconstitution. MRI shows b/ centrum semiovale, lacunar Left corona radiata infarcts. Carotid u/s also shows Right CCA >70% stenosis. Exam: Awake, no verbal outpt, Ox0 (occasionally Ox1-2 per nurse), intermittently FC/mimics, EOMI, PERRL, ROA spontaneously L>R  -Admitted to Medicine  -Preop for Angio, possible Left ICA stent  -Cr 5.94, needs Nephro clearance 67M, CVA w/o deficits s/p 2014 Left CEA w/ Vasc Surg Dr. Pinto, CAD, T2DM, AF. On ASA81, Eliquis. Admitted 4/3 for lethargy, found to have renal failure, now on HD. CTA on 5/12 shows Left ICA proximal occlusion with distal IC reconstitution. MRI shows b/l centrum semiovale, lacunar Left corona radiata infarcts. Carotid u/s also shows Right CCA >70% stenosis. Exam: Awake, no verbal outpt, Ox0 (occasionally Ox1-2 per nurse), intermittently FC/mimics, EOMI, PERRL, ROA spontaneously L>R  -Admitted to Medicine  -Preop for Angio, possible Left ICA stent  -Cr 5.94, needs Nephro clearance 67M, CVA w/o deficits s/p 2014 Left CEA w/ Vasc Surg Dr. Pinto, CAD, T2DM, AF. On ASA81, Eliquis. Admitted 4/3 for lethargy, found to have renal failure, now on HD. CTA on 5/12 shows Left ICA proximal occlusion with distal IC reconstitution. MRI shows b/l centrum semiovale, lacunar Left corona radiata infarcts. Carotid u/s also shows Right CCA >70% stenosis. Exam: Awake, no verbal outpt, Ox0 (occasionally Ox1-2 per nurse), intermittently FC/mimics, EOMI, PERRL, ROA spontaneously L>R  -Admitted to Medicine  -Preop for Angio, possible Left ICA stent on Wednesday  -Cr 5.94, would benefit from HD on Tues and Thurs  -Needs Medicine, Renal, Medical clearance documented

## 2022-05-23 NOTE — PROGRESS NOTE ADULT - ASSESSMENT
Impression:  This is a 67 year old gentleman pmhx CAD s/p MI/PCI/CABG, hx TIA, afib on rivaroxaban, HTN, DM2, PVD, gout, L CEA 2014, and CKD who presented to [facility] 4/11/22 with ANT, tremors, confusion and lethargy.  CT head no acute changes.  S/p initiation of hemodialysis.  Mental status has improved dramatically.  Pt denies any headaches, no slurred speech, no hemiparesis.  He has chronic gout with bilateral finger swelling, pain.  He also has chronic neuropathy.  Both legs are weak.      Persistent encephalopathy prompted re-consultation on 5/10/22.  MRI brain was ordered and revealed small acute infarctions in bilateral posterior centrum semiovale and left corona radiata and left posterior periventricular white matter.  Carotid ultrasound was performed showing new occlusion of the left internal carotid artery, along with greater than 70% stenosis involving the distal right common carotid artery, and mild to moderate flow-limiting stenosis is seen at the left external carotid artery.  Vascular surgery has been consulted and is recommending CTA.  He continues to be encephalopathic and lethargic.      Diagnosis and Recommendations:  1. At this time suspect delirium may be related to volume depletion. Wife and nursing report no PO intake for days. Hasn't been compliant with medications and missed medication dosing.    -Advise either IV fluids or NGT.   - Convert ASA to chewable/crushed  - Monitor for malnutrition  - Daughter resistant to NGT placement at this time    2. Complete left ICA occlusion and 70% distal CCA stenosis. Has collateral flow via acomm and pcomm but once delirium resolves should be re-evaluated for either CEA or carotid stenting. CEA would be ideal considering issues with medication compliance and potential difficulty adhering to DAPT regimen.    This should not wait for assessment as outpatient if delirium resolves. Unfortunately all options have risk    3. Reviewed MRI - CVAs in bilateral watershed territory which can potentially contribute to delirium, those these CVAs do not appear particularly large.      - MRA was suboptimal not visualizing the neck but MRA head showed left KESHAWN coming from ACOM and left PCA feeding left MCA.  ICA showed no flow.    - in setting of atrial fibrillation infarcts could potentially also be cardioembolic, continue anticoagulation as per cardiology  - s/p carotid doppler, suboptimal CTA/MRA.  However, it is apparent that there is a proximal left ICA occlusion, which would not be a candidate for intervention.  Intervention for right CCA/ICA stenosis would be high risk but it may indeed by a symptomatic stenosis.   - ECHO in February 2022 did not reveal severe cardiomyopathy, vegetation, or LV thrombus.    - HgA1c of 11 also driving stroke risk.  Goal is < 7  - continue high-intensity statin therapy    will follow

## 2022-05-23 NOTE — PROGRESS NOTE ADULT - ASSESSMENT
Echo 5/13/22:  1. Normal left ventricular internal dimensions and wall  thicknesses.  2. Endocardial visualization enhanced with intravenous  injection of Ultrasonic Enhancing Agent (Lumason). Grossly  borderline normal left ventricular systolic function; no  obvious segmental wall motion abnormality.  3. The right ventricle is not well visualized.  *** Compared with echocardiogram of 2/22/2022, no  significant changes noted.      A/P    66 y/o M with history of CAD, s/p multiple PCI, with most recent 2/22 PCI of LAD in setting of NSTEMI, on ASA only (pradaxa), chronic atrial fibrillation maintained on Pradaxa, essential HTN, type 2 DM previously on oral medications but on insulin, diabetic neuropathy with chronic RIGHT LE venous stasis changes>left, LEFT foot ulcer seen by podiatry, past endarterectomy LEFT CEA in 2014 presenting with 1 week of decreased urine output, cystitis with hematuria     #ANT on CKD, new HD  -oliguric renal failure in setting of UTI/cystitis  -New HD for uremia, renal failure  -sp rrt 4/12 for uncontrolled bleeding sp  right groin shiley removal  - monitor h/h - stable  -s/p L AVG 4/18  -s/p permacath placement 4/20  -renal f/ u    #AMS/Lethargy  -recent ams from pain meds  -AMS sp RRT 4/27  likely secondary to pain med   -repeat CT 4/27 unremarkable --  discontinued Percocet  -MRI 5/11  revealed small acute infarctions in bilateral posterior centrum semiovale and left corona radiata and left posterior periventricular white matter  -on asa statin  -repeat echo with no interval changes  -CTa head neck noted; neuro following     #Acute on chronic HFpEF  -stable  -continue volume removal with HD  -c/w bb  -repeat echo with stable borderline lv fxn    #Chronic AF  -rates elevated today  -c/w metoprolol succ 100 mg qd  -c/w  cardizem 180 mg daily   -c/w eliquis 5 mg BID for now - heme stable     #HTN  -stable  -c/w meds       #CAD, s/p PCI, most recent 2/2022  -stable, cont asa  -off plavix due to a/c indication  -statin     #carotid stenosis   -previous MRA  noted with Greater than 75% stenosis of the right distal common carotid artery. Approximately 60% stenosis of the proximal left internal carotid artery.  -carotid dopplers 5/11 noted :  There is new occlusion of the left internal carotid artery;  greater than 70% stenosis involving the distal right common carotid artery as was seen previously. Mild to moderate flow-limiting stenosis is seen at the left external   carotid artery.  -CTa head and neck 5/12 noted  -Continue ASA and Statin Therapy  -neuro fu  / work up / imaging per vascular   -mra head/ neck noted- per vascular outpt follow up for repeat to re eval w MRI / mra of the brain neck       DCP

## 2022-05-23 NOTE — PROGRESS NOTE ADULT - SUBJECTIVE AND OBJECTIVE BOX
Follow-up Pulm Progress Note    alert, less confused today  Sats 93% RA  Denies SOB/CP     Medications:  MEDICATIONS  (STANDING):  allopurinol 100 milliGRAM(s) Oral daily  apixaban 5 milliGRAM(s) Oral two times a day  aspirin  chewable 81 milliGRAM(s) Oral daily  atorvastatin 40 milliGRAM(s) Oral at bedtime  chlorhexidine 4% Liquid 1 Application(s) Topical <User Schedule>  dextrose 50% Injectable 25 Gram(s) IV Push once  dextrose 50% Injectable 12.5 Gram(s) IV Push once  dextrose 50% Injectable 25 Gram(s) IV Push once  diltiazem    milliGRAM(s) Oral daily  insulin lispro (ADMELOG) corrective regimen sliding scale   SubCutaneous three times a day before meals  insulin lispro (ADMELOG) corrective regimen sliding scale   SubCutaneous at bedtime  lidocaine   4% Patch 1 Patch Transdermal daily  metoprolol succinate  milliGRAM(s) Oral daily  mupirocin 2% Ointment 1 Application(s) Both Nostrils two times a day  polyethylene glycol 3350 17 Gram(s) Oral two times a day  povidone iodine 10% Solution 1 Application(s) Topical daily  senna 2 Tablet(s) Oral at bedtime  sodium chloride 0.9%. 1000 milliLiter(s) (40 mL/Hr) IV Continuous <Continuous>  sodium chloride 0.9%. 1000 milliLiter(s) (50 mL/Hr) IV Continuous <Continuous>  sodium chloride 0.9%. 250 milliLiter(s) (250 mL/Hr) IV Continuous <Continuous>    MEDICATIONS  (PRN):  bisacodyl 5 milliGRAM(s) Oral every 12 hours PRN Constipation  dextrose Oral Gel 15 Gram(s) Oral once PRN Blood Glucose LESS THAN 70 milliGRAM(s)/deciliter  sodium chloride 0.9% lock flush 10 milliLiter(s) IV Push every 1 hour PRN Pre/post blood products, medications, blood draw, and to maintain line patency          Vital Signs Last 24 Hrs  T(C): 36.3 (23 May 2022 04:10), Max: 36.4 (22 May 2022 20:10)  T(F): 97.4 (23 May 2022 04:10), Max: 97.5 (22 May 2022 20:10)  HR: 88 (23 May 2022 04:10) (88 - 101)  BP: 145/65 (23 May 2022 04:10) (136/64 - 146/64)  BP(mean): --  RR: 18 (23 May 2022 04:10) (18 - 18)  SpO2: 93% (23 May 2022 04:10) (93% - 95%)          05-22 @ 07:01  -  05-23 @ 07:00  --------------------------------------------------------  IN: 600 mL / OUT: 0 mL / NET: 600 mL                CAPILLARY BLOOD GLUCOSE      POCT Blood Glucose.: 194 mg/dL (23 May 2022 08:25)              Physical Examination:  PULM: Clear to auscultation bilaterally, no significant sputum production  CVS: S1, S2 heard    RADIOLOGY REVIEWED  CXR 5/12: small L effusion    CT chest: < from: CT Chest No Cont (04.04.22 @ 16:52) >  FINDINGS:    AIRWAYS, LUNGS, PLEURA: Tracheal secretions. Small left greater than   right pleural effusions increased compared to 2/19/2022. Right-sided   pleural thickening. Lingular and left greater than right basilar   opacification, likely atelectasis.    MEDIASTINUM: Cardiomegaly. No pericardial effusion. Thoracic aorta normal   caliber.  No large mediastinal lymph nodes.    IMAGED ABDOMEN: Nonspecific perinephric stranding.    SOFT TISSUES: Unremarkable.    BONES: Unremarkable.    IMPRESSION:.    Small left greater than right bilateral pleural effusions increased in   size compared to 2/19/2022.    Lingular and left greater than right basilar opacification, likely   atelectasis.    --- End of Report ---    < end of copied text >

## 2022-05-23 NOTE — CONSULT NOTE ADULT - SUBJECTIVE AND OBJECTIVE BOX
p (1480)     HPI:  67M, CVA w/o deficits s/p 2014 Left CEA w/ Vasc Surg Dr. Pinto, CAD, T2DM, AF. On ASA81, Eliquis. Admitted 4/3 for lethargy, found to have renal failure, now on HD. CTA on 5/12 shows Left ICA proximal occlusion with distal IC reconstitution. MRI shows b/ centrum semiovale, lacunar Left corona radiata infarcts. Carotid u/s also shows Right CCA >70% stenosis. Exam: Awake, no verbal outpt, Ox0 (occasionally Ox1-2 per nurse), intermittently FC/mimics, EOMI, PERRL, ROA spontaneously L>R    --Anticoagulation:  apixaban 5 milliGRAM(s) Oral two times a day  aspirin  chewable 81 milliGRAM(s) Oral daily    =====================  PAST MEDICAL HISTORY   HTN (hypertension), benign    HLD (hyperlipidemia)    DM type 2 (diabetes mellitus, type 2)    TIA (transient ischemic attack)    Atrial fibrillation    MI (myocardial infarction)    CAD (coronary artery disease)    Neuropathy    Stage 3 chronic kidney disease    2019 novel coronavirus disease (COVID-19)      PAST SURGICAL HISTORY   Status post angioplasty with stent    S/P carotid endarterectomy    S/P CABG (coronary artery bypass graft)      nitrofurantoin (Nephrotoxicity)      MEDICATIONS:  Antibiotics:    Neuro:    Other:  allopurinol 100 milliGRAM(s) Oral daily  atorvastatin 40 milliGRAM(s) Oral at bedtime  bisacodyl 5 milliGRAM(s) Oral every 12 hours PRN  dextrose 50% Injectable 25 Gram(s) IV Push once  dextrose 50% Injectable 25 Gram(s) IV Push once  dextrose 50% Injectable 12.5 Gram(s) IV Push once  dextrose Oral Gel 15 Gram(s) Oral once PRN  diltiazem    milliGRAM(s) Oral daily  insulin lispro (ADMELOG) corrective regimen sliding scale   SubCutaneous three times a day before meals  insulin lispro (ADMELOG) corrective regimen sliding scale   SubCutaneous at bedtime  metoprolol succinate  milliGRAM(s) Oral daily  multivitamin/minerals 1 Tablet(s) Oral daily  polyethylene glycol 3350 17 Gram(s) Oral two times a day  senna 2 Tablet(s) Oral at bedtime  sodium chloride 0.9% lock flush 10 milliLiter(s) IV Push every 1 hour PRN  sodium chloride 0.9%. 1000 milliLiter(s) IV Continuous <Continuous>  sodium chloride 0.9%. 250 milliLiter(s) IV Continuous <Continuous>  sodium chloride 0.9%. 1000 milliLiter(s) IV Continuous <Continuous>      SOCIAL HISTORY:   Occupation:   Marital Status:     FAMILY HISTORY:  No pertinent family history in first degree relatives    Family history of heart disease    Family history of CVA        ROS: Negative except per HPI    LABS:               p (1480)     HPI:  67M, CVA w/o deficits s/p 2014 Left CEA w/ Vasc Surg Dr. Pinto, CAD, T2DM, AF. On ASA81, Eliquis. Admitted 4/3 for lethargy, found to have renal failure, now on HD. CTA on 5/12 shows Left ICA proximal occlusion with distal IC reconstitution. MRI shows b/l centrum semiovale, lacunar Left corona radiata infarcts. Carotid u/s also shows Right CCA >70% stenosis. Exam: Awake, no verbal outpt, Ox0 (occasionally Ox1-2 per nurse), intermittently FC/mimics, EOMI, PERRL, ROA spontaneously L>R    --Anticoagulation:  apixaban 5 milliGRAM(s) Oral two times a day  aspirin  chewable 81 milliGRAM(s) Oral daily    =====================  PAST MEDICAL HISTORY   HTN (hypertension), benign    HLD (hyperlipidemia)    DM type 2 (diabetes mellitus, type 2)    TIA (transient ischemic attack)    Atrial fibrillation    MI (myocardial infarction)    CAD (coronary artery disease)    Neuropathy    Stage 3 chronic kidney disease    2019 novel coronavirus disease (COVID-19)      PAST SURGICAL HISTORY   Status post angioplasty with stent    S/P carotid endarterectomy    S/P CABG (coronary artery bypass graft)      nitrofurantoin (Nephrotoxicity)      MEDICATIONS:  Antibiotics:    Neuro:    Other:  allopurinol 100 milliGRAM(s) Oral daily  atorvastatin 40 milliGRAM(s) Oral at bedtime  bisacodyl 5 milliGRAM(s) Oral every 12 hours PRN  dextrose 50% Injectable 25 Gram(s) IV Push once  dextrose 50% Injectable 25 Gram(s) IV Push once  dextrose 50% Injectable 12.5 Gram(s) IV Push once  dextrose Oral Gel 15 Gram(s) Oral once PRN  diltiazem    milliGRAM(s) Oral daily  insulin lispro (ADMELOG) corrective regimen sliding scale   SubCutaneous three times a day before meals  insulin lispro (ADMELOG) corrective regimen sliding scale   SubCutaneous at bedtime  metoprolol succinate  milliGRAM(s) Oral daily  multivitamin/minerals 1 Tablet(s) Oral daily  polyethylene glycol 3350 17 Gram(s) Oral two times a day  senna 2 Tablet(s) Oral at bedtime  sodium chloride 0.9% lock flush 10 milliLiter(s) IV Push every 1 hour PRN  sodium chloride 0.9%. 1000 milliLiter(s) IV Continuous <Continuous>  sodium chloride 0.9%. 250 milliLiter(s) IV Continuous <Continuous>  sodium chloride 0.9%. 1000 milliLiter(s) IV Continuous <Continuous>      SOCIAL HISTORY:   Occupation:   Marital Status:     FAMILY HISTORY:  No pertinent family history in first degree relatives    Family history of heart disease    Family history of CVA        ROS: Negative except per HPI    LABS:

## 2022-05-23 NOTE — PROGRESS NOTE ADULT - SUBJECTIVE AND OBJECTIVE BOX
[facility]   [phone]  RENAL FOLLOW UP NOTE  --------------------------------------------------------------------------------  Chief Complaint:    24 hour events/subjective:    patient seen and examined with Dr. Cordova  awake, answering questions.   +orthostatic    PAST HISTORY  --------------------------------------------------------------------------------  No significant changes to PMH, PSH, FHx, SHx, unless otherwise noted    ALLERGIES & MEDICATIONS  --------------------------------------------------------------------------------  Allergies    nitrofurantoin (Nephrotoxicity)    Intolerances      Standing Inpatient Medications  allopurinol 100 milliGRAM(s) Oral daily  apixaban 5 milliGRAM(s) Oral two times a day  aspirin  chewable 81 milliGRAM(s) Oral daily  atorvastatin 40 milliGRAM(s) Oral at bedtime  chlorhexidine 4% Liquid 1 Application(s) Topical <User Schedule>  dextrose 50% Injectable 25 Gram(s) IV Push once  dextrose 50% Injectable 12.5 Gram(s) IV Push once  dextrose 50% Injectable 25 Gram(s) IV Push once  diltiazem    milliGRAM(s) Oral daily  insulin lispro (ADMELOG) corrective regimen sliding scale   SubCutaneous at bedtime  insulin lispro (ADMELOG) corrective regimen sliding scale   SubCutaneous three times a day before meals  lidocaine   4% Patch 1 Patch Transdermal daily  metoprolol succinate  milliGRAM(s) Oral daily  multivitamin/minerals 1 Tablet(s) Oral daily  mupirocin 2% Ointment 1 Application(s) Both Nostrils two times a day  polyethylene glycol 3350 17 Gram(s) Oral two times a day  povidone iodine 10% Solution 1 Application(s) Topical daily  senna 2 Tablet(s) Oral at bedtime  sodium chloride 0.9%. 1000 milliLiter(s) IV Continuous <Continuous>  sodium chloride 0.9%. 250 milliLiter(s) IV Continuous <Continuous>  sodium chloride 0.9%. 1000 milliLiter(s) IV Continuous <Continuous>    PRN Inpatient Medications  bisacodyl 5 milliGRAM(s) Oral every 12 hours PRN  dextrose Oral Gel 15 Gram(s) Oral once PRN  sodium chloride 0.9% lock flush 10 milliLiter(s) IV Push every 1 hour PRN      REVIEW OF SYSTEMS  --------------------------------------------------------------------------------    Gen: denies fevers/chills,  CVS: denies chest pain/palpitations  Resp: denies SOB/Cough  GI: Denies N/V/Abd pain  : Denies dysuria    VITALS/PHYSICAL EXAM  --------------------------------------------------------------------------------  T(C): 36.4 (05-23-22 @ 13:06), Max: 36.4 (05-22-22 @ 20:10)  HR: 101 (05-23-22 @ 13:06) (88 - 101)  BP: 134/67 (05-23-22 @ 13:06) (134/67 - 145/65)  RR: 18 (05-23-22 @ 13:06) (18 - 18)  SpO2: 95% (05-23-22 @ 13:06) (93% - 95%)  Wt(kg): --        05-22-22 @ 07:01  -  05-23-22 @ 07:00  --------------------------------------------------------  IN: 600 mL / OUT: 0 mL / NET: 600 mL    05-23-22 @ 07:01  -  05-23-22 @ 17:07  --------------------------------------------------------  IN: 400 mL / OUT: 0 mL / NET: 400 mL      Physical Exam:  	              Gen:  confused   	Pulm: Decreased breath sounds b/l bases.  	CV: No JVD. IRR  	Abd: +BS, soft, nontender, softly distended, obese               Extremity no edema              Extremity LE: + hyperpigmented bilateral LE with no edema              Vascular: R IJ HD catheter, L arm AVF     LABS/STUDIES  --------------------------------------------------------------------------------                Creatinine Trend:  SCr 5.94 [05-21 @ 07:23]  SCr 6.79 [05-19 @ 13:49]  SCr 7.34 [05-17 @ 05:54]  SCr 5.98 [05-16 @ 06:28]  SCr 4.22 [05-15 @ 07:37]                  Iron 21, TIBC 245, %sat 9      [05-17-22 @ 05:54]  Ferritin 120      [05-17-22 @ 05:54]  PTH -- (Ca 8.3)      [04-15-22 @ 12:18]   150  PTH -- (Ca --)      [04-03-22 @ 23:06]   97  HbA1c 11.7      [11-07-19 @ 09:14]  TSH 1.71      [05-12-22 @ 07:11]

## 2022-05-23 NOTE — PROGRESS NOTE ADULT - ASSESSMENT
·  Problem:[email or web address] hemodialysis per renal  to cont as per renal  f/u labs      Problem/Plan - 2:  ·  Problem: Chronic atrial fibrillation. c/w a/c  cards f/u     Problem/Plan - 3:  ·  Problem: Cystitis.   ID follow up appreciated        Problem/Plan - 4:  ·  Problem: Type 2 diabetes mellitus. c/w insulin   endo f/u      Problem/Plan - 5:  ·  Problem: CAD (coronary artery disease).   rapid a fib  meds adjusted  hr stable  c/w a/c       Persistent encephalopathy   neuro follow up appreciated  f/u labs   mental status better  polypharmacy contributing   minimizing meds     carotid duplex noted  cta noted  mra/ mri noted  tx plans as per vasc / neuro   neuro f/u noted  will have vasc reeval as carotid stent may be needed even thou high risk  vasc to f/u      spoke w/ son at bedside

## 2022-05-23 NOTE — PROGRESS NOTE ADULT - PROBLEM SELECTOR PLAN 3
MR head with multiple small acute infarctions  -Repeat TTE with no changes   -Plans to re-eval carotid stenosis as an output as per vascular   -Neuro f/u

## 2022-05-23 NOTE — PROGRESS NOTE ADULT - ASSESSMENT
67 year old gentleman with PMH of CAD, NSTEMI s/p 3 stents in 2014 (stents to LAD, proximal and distal LCx), ischemic cardiomyopathy, HTN for 10 years, T2DM for 26 years c/b peripheral neuropathy, CVA, TIA, Afib on Pradaxa, chronic RLE wound, L carotid stenosis s/p endarterectomy (2014) just recently admitted  to the [facility] on 2/19/2022 with acute onset chest pain for one day found to have an NSTEMI, CAD, s/p PCI in setting of increased AF rates off bb for 3 days and resolved chest pain, his CKMB peaked and Echo noted with EF 60%,normal LV function, mild TR, mild IA. He was s/p [facility] with 85% proximal LAD s/p balloon angioplasty and LUUL. He presented to [facility] ED with decreased urine output over the week. Mr. Stevens went to [facility] for hematuria and was started  on Nitrofurantoin. Pt is still taking Nitrofurantoin w/ 5 days left. He came to the ED due to worsening symptoms. Pt reports having a similar incident 4-5 years ago that resolved spontaneously. He reports increased leg edema Dyspnea worse on exertion. his baseline Serum creatinine is in the 2.0 to 2.3 mg/dl range. ANT with serum creatinine of 8.29 with bun 144 and k 5.5      1- ANT on ckd III ---> ESRD likely   2- chf chronic   3- hyperphosphatemia /shpt   4- anemia   5- hyperlipidemia   6- HTN /a fib  7- TIA hx   8- hx of carotid stenosis      no HD today  renvela 2 tab with meals   off hydralazine due to orthostatic hypotension  gentle IVF hydration NS 50 cc/24 hours  calorie count  anemia - epogen 03521 Units TIW with HD.    PT

## 2022-05-23 NOTE — PROGRESS NOTE ADULT - SUBJECTIVE AND OBJECTIVE BOX
Patient is a 67y old  Male who presents with a chief complaint of Decreased urine output for the past week, leg oedema (23 May 2022 17:07)      Vascular Surgery Attending Progress Note    Interval HPI: pt w/o new c/o     Medications:  allopurinol 100 milliGRAM(s) Oral daily  apixaban 5 milliGRAM(s) Oral two times a day  aspirin  chewable 81 milliGRAM(s) Oral daily  atorvastatin 40 milliGRAM(s) Oral at bedtime  bisacodyl 5 milliGRAM(s) Oral every 12 hours PRN  chlorhexidine 4% Liquid 1 Application(s) Topical <User Schedule>  dextrose 50% Injectable 25 Gram(s) IV Push once  dextrose 50% Injectable 25 Gram(s) IV Push once  dextrose 50% Injectable 12.5 Gram(s) IV Push once  dextrose Oral Gel 15 Gram(s) Oral once PRN  diltiazem    milliGRAM(s) Oral daily  insulin lispro (ADMELOG) corrective regimen sliding scale   SubCutaneous three times a day before meals  insulin lispro (ADMELOG) corrective regimen sliding scale   SubCutaneous at bedtime  lidocaine   4% Patch 1 Patch Transdermal daily  metoprolol succinate  milliGRAM(s) Oral daily  multivitamin/minerals 1 Tablet(s) Oral daily  mupirocin 2% Ointment 1 Application(s) Both Nostrils two times a day  polyethylene glycol 3350 17 Gram(s) Oral two times a day  povidone iodine 10% Solution 1 Application(s) Topical daily  senna 2 Tablet(s) Oral at bedtime  sodium chloride 0.9% lock flush 10 milliLiter(s) IV Push every 1 hour PRN  sodium chloride 0.9%. 1000 milliLiter(s) IV Continuous <Continuous>  sodium chloride 0.9%. 250 milliLiter(s) IV Continuous <Continuous>  sodium chloride 0.9%. 1000 milliLiter(s) IV Continuous <Continuous>  sodium chloride 0.9%. 1000 milliLiter(s) IV Continuous <Continuous>      Vital Signs Last 24 Hrs  T(C): 36.3 (23 May 2022 20:30), Max: 36.4 (23 May 2022 13:06)  T(F): 97.3 (23 May 2022 20:30), Max: 97.5 (23 May 2022 13:06)  HR: 81 (23 May 2022 20:30) (81 - 101)  BP: 132/59 (23 May 2022 20:30) (132/59 - 145/65)  BP(mean): --  RR: 18 (23 May 2022 20:30) (18 - 18)  SpO2: 97% (23 May 2022 20:30) (93% - 97%)  I&O's Summary    22 May 2022 07:01  -  23 May 2022 07:00  --------------------------------------------------------  IN: 600 mL / OUT: 0 mL / NET: 600 mL    23 May 2022 07:01  -  23 May 2022 20:46  --------------------------------------------------------  IN: 520 mL / OUT: 0 mL / NET: 520 mL        Physical Exam:  Neuro  A&Ox2 VSS  Vascular:  no acute changes         UMAIR MCGRAW MD  [id number] Cell [phone]

## 2022-05-24 NOTE — PROGRESS NOTE ADULT - SUBJECTIVE AND OBJECTIVE BOX
Subjective:  In bed, awake but disoriented, son at bedside, feels unwell but cannot describe further     Medications:  allopurinol 100 milliGRAM(s) Oral daily  apixaban 5 milliGRAM(s) Oral two times a day  aspirin  chewable 81 milliGRAM(s) Oral daily  atorvastatin 40 milliGRAM(s) Oral at bedtime  bisacodyl 5 milliGRAM(s) Oral every 12 hours PRN  chlorhexidine 4% Liquid 1 Application(s) Topical <User Schedule>  dextrose 50% Injectable 25 Gram(s) IV Push once  dextrose 50% Injectable 25 Gram(s) IV Push once  dextrose 50% Injectable 12.5 Gram(s) IV Push once  dextrose Oral Gel 15 Gram(s) Oral once PRN  epoetin naz-epbx (RETACRIT) Injectable 60950 Unit(s) IV Push once  insulin lispro (ADMELOG) corrective regimen sliding scale   SubCutaneous three times a day before meals  insulin lispro (ADMELOG) corrective regimen sliding scale   SubCutaneous at bedtime  iron sucrose Injectable 100 milliGRAM(s) IV Push once  lidocaine   4% Patch 1 Patch Transdermal daily  metoprolol succinate  milliGRAM(s) Oral daily  multivitamin/minerals 1 Tablet(s) Oral daily  mupirocin 2% Ointment 1 Application(s) Both Nostrils two times a day  polyethylene glycol 3350 17 Gram(s) Oral two times a day  povidone iodine 10% Solution 1 Application(s) Topical daily  senna 2 Tablet(s) Oral at bedtime  sodium chloride 0.9% lock flush 10 milliLiter(s) IV Push every 1 hour PRN  sodium chloride 0.9%. 1000 milliLiter(s) IV Continuous <Continuous>      Labs:          CAPILLARY BLOOD GLUCOSE      POCT Blood Glucose.: 214 mg/dL (24 May 2022 08:22)  POCT Blood Glucose.: 231 mg/dL (23 May 2022 20:52)  POCT Blood Glucose.: 216 mg/dL (23 May 2022 16:46)  POCT Blood Glucose.: 224 mg/dL (23 May 2022 12:16)              Vitals:  Vital Signs Last 24 Hrs  T(C): 36.3 (24 May 2022 05:07), Max: 36.4 (23 May 2022 13:06)  T(F): 97.3 (24 May 2022 05:07), Max: 97.5 (23 May 2022 13:06)  HR: 74 (24 May 2022 05:07) (74 - 101)  BP: 144/61 (24 May 2022 06:22) (132/59 - 174/83)  BP(mean): --  RR: 18 (24 May 2022 05:07) (18 - 18)  SpO2: 95% (24 May 2022 05:07) (95% - 97%)    NEUROLOGICAL EXAM:    Mental status: awake, tired, no active distress.  He is oriented to person, knew hospital and year, but not month or day.  He intermittently follows very simple commands and names very simple objects.        Cranial Nerves: Pupils were equal, round, reactive to light. Extraocular movements were intact. B BTT Facial sensation was intact to light touch. There was no facial asymmetry. The palate was upgoing symmetrically and tongue was midline. Hearing acuity was intact to finger rub AU. Shoulder shrug was full bilaterally    Motor exam: Bulk and tone were normal. moves all ext antigravity but drifts to bed.  There is relative right leg weakness in setting of pain which has been previously present.  Fine finger movements were limited by mental status.  There was no pronator drift    Reflexes: DTRs depressed, flexor plantars.     Sensation: Intact to noxious, seems intact to light touch, due to decreased attention did not assess other modalities     Coordination: no gross dysmetria    Gait: deferred    NIHSS: 12    Imaging:    ACC: [id number] EXAM:  CT BRAIN                        PROCEDURE DATE:  04/09/2022      INTERPRETATION:  CLINICAL INFORMATION:  Altered mental status, on   anticoagulation .    TECHNIQUE: Multiple contiguous axial images were acquired from the   skullbase to the vertex without the administration of intravenous   contrast.  Coronal and sagittal reformations were made.    COMPARISON: CT head 10/21/2021, brain MRI 02/23/2014.    FINDINGS:    Scattered lacunar infarcts in the bilateral cerebralhemispheres are   grossly stable compared to the 10/21/2021 head CT. No new additional   infarcts are noted over the time interval.    No acute intracranial hemorrhage, mass effect, or midline shift. The   brain demonstrates periventricular hypoattenuation, nonspecific in   etiology but likely representing chronic microvascular ischemic changes.    No abnormal extra-axial fluid collections are present.    The ventricles and sulci demonstrate age appropriate involutional   changes.  The basal cisterns are patent.    The orbits are unremarkable. The paranasal sinuses are clear. The mastoid   air cells are clear. The calvarium is intact.    IMPRESSION:    No acute intracranial abnormality is noted. If the patient has new and   persistent symptoms, consider short interval follow-up head CT or brain   MRI.    --- End of Report ---    GATITO VILLRAREAL MD; Resident Radiologist  This document has been electronically signed.  JESSE CORONA MD; Attending Radiologist  This document has been electronically signed. Apr 9 2022  2:00PM        ACC: [id number] EXAM:  MR BRAIN                          PROCEDURE DATE:  05/11/2022          INTERPRETATION:  MR brain  without gadolinium    CLINICAL INFORMATION: Stroke.    TECHNIQUE: Limited Sagittal T1-weighted images, axial FLAIR images, and   axial diffusion weighted images of the brain were obtained.  Patient was   unable to hold still for remaining sequences.    FINDINGS:   MRI dated 02/23/2014 available for review.    The brain demonstrates small acute infarctions in the BILATERAL posterior   centrum semiovale and LEFT corona radiata and LEFT posterior   periventricular white matter with restricted diffusion. No intracranial   hemorrhage is recognized. No mass effect is found in the brain.    The ventricles, sulci and basal cisterns show mild global atrophy most   prominent within the BILATERAL cerebellum.    The vertebral and internal carotid arteries demonstrate expected flow   voids indicating their patency.    The orbits are unremarkable.  The paranasal sinuses are clear.  The nasal   cavity appears intact.  The nasopharynx is symmetric.  The central skull   base and temporal bones are intact.  The calvarium appears unremarkable.    IMPRESSION: Small acute infarctions in the BILATERAL posterior centrum   semiovale and LEFT corona radiata and LEFT posterior periventricular   white matter with restricted diffusion.   mild global atrophy most   prominent within the BILATERAL cerebellum.    --- End of Report ---            JESÚS PADGETT MD; Attending Radiologist  This document has been electronically signed. May 11 2022  5:47PM        Patient name: DYLAN DEL CASTILLO  YOB: 1955   Age: 67 (M)   MR#: [id number]  Study Date: 2/22/2022  [facility]  Study quality: Technically difficult  Referring Physician: Mendoza Corral MD  Blood Pressure: 152/82 mmHg  Height: 180 cm  Weight: 136 kg  BSA: 2.5 m2  ------------------------------------------------------------------------  PROCEDURE: Transthoracic echocardiogram with 2-D, M-Mode  and complete spectral and color flow Doppler. Verbal  consent was obtained for injection of  Ultrasonic Enhancing  Agent following a discussion of risks and benefits.  Following intravenous injection of Ultrasonic Enhancing  Agent , harmonic imaging was performed.  INDICATION: Chest pain, unspecified (R07.9)  ------------------------------------------------------------------------  Dimensions:    Normal Values:  LA:     4.4    2.0 - 4.0 cm  Ao:     4.0    2.0 - 3.8 cm  SEPTUM: 1.1    0.6 - 1.2 cm  PWT:    0.9    0.6 - 1.1 cm  LVIDd:  5.0    3.0 - 5.6 cm  LVIDs:  3.5    1.8 - 4.0 cm  Derived variables:  LVMI: 73 g/m2  RWT: 0.36  EF (Visual Estimate): 60 %  Doppler Peak Velocity (m/sec): AoV=1.5 TV=2.2  ------------------------------------------------------------------------  Observations:  Mitral Valve: Normal mitral valve.  Aortic Valve/Aorta: Calcified aortic valve with normal  opening. Minimal aortic regurgitation.  Normal aortic root size.  Left Atrium: Mildly dilated left atrium.  Left Ventricle: Endocardial visualization enhanced with  intravenous injection of Ultrasonic Enhancing Agent  (Definity).  Normal left ventricular internal dimensions and wall  thickness.  Normal left ventricular systolic function. No segmental  wall motion abnormalities.  Right Heart: Normal right atrium. Normal right ventricular  size and function.  Normal tricuspid valve. Mild tricuspid regurgitation.  Normal pulmonic valve. Mild pulmonic regurgitation.  Pericardium/Pleura: Normal pericardium with no pericardial  effusion.  Hemodynamic: No evidence of pulmonary hypertension.  ------------------------------------------------------------------------  Conclusions:  Endocardial visualization enhanced with intravenous  injection of Ultrasonic Enhancing Agent (Definity).  Normal left ventricular systolic function. No segmental  wall motion abnormalities.  ------------------------------------------------------------------------  Confirmed on  2/22/2022 - 16:17:39 by Ishan Ackerman MD, FASE  ------------------------------------------------------------------------    < from: CT Angio Head w/ IV Cont (05.12.22 @ 13:34) >    ACC: [id number] EXAM:  CT ANGIO NECK (W)[facility]                        ACC: [id number] EXAM:  CT ANGIO BRAIN (W)[facility]                          PROCEDURE DATE:  05/12/2022          INTERPRETATION:  CT angiography of the brain and neck    CLINICAL INDICATION: Recent infarcts. Carotid stenosis.    TECHNIQUE: Direct axial CT scanning of the brain and neck was obtained   from the vertex to the level of the clavicular heads after the dynamic   intravenous injection of 44 cc of Omnipaque 300. 0 cc were discarded.   Sagittal and coronal maximum intensity projection reformats were   provided.  Three-dimensional reconstructions were performed by the   radiologist using the [employer] Virtual workstation.    Additionally, noncontrast axial CT scanning of the brain was obtained   from the skull base to the vertex. Sagittal and coronal reformats were   provided.    COMPARISON: MRA neck 10/24/2021    FINDINGS:    CT brain:    Previously seen acute infarcts in the bilateral cerebral hemispheres are   redemonstrated. No CT evidence for hemorrhagic transformation.    No hydrocephalus, midline shift, or effacement of basal cisterns.    No acute intracranial hemorrhage or vasogenic edema.    Mild to moderate white matter microvascular ischemic disease.    CTA brain:    Limited by relatively decreased opacification of the arteries.    Multifocal narrowing of the right skull base ICA due to calcified plaque,   the degree of which is not well evaluated.    Normal flow-related enhancement of the supraclinoid right ICA.    Lack of flow related enhancement of the petrous, precavernous, and   cavernous left ICA. Reconstitution of the vessel at its supraclinoid   segment.    Otherwise no flow-limiting stenosis.    Normal flow-related enhancement of the bilateral anterior, middle, and   posterior cerebral arteries.    Normal flow-related enhancement of the intradural vertebral arteries and   basilar artery.    No vascular aneurysm or AVM.    CTA neck:    Stenoses described in this report are on the basis of NASCET criteria.    Study is significantly limited by relatively decreased opacification of   the arteries.    Short segment stenosis of the mid left common carotid artery due to the   presence of partially calcified plaque is poorly evaluated. Flow-related   enhancement of the more proximal and distal left common carotid artery is   noted.    Long segment stenosis of the mid and distal right common carotid artery   due to the presence of partially calcified plaque is poorly evaluated.    Rapidly progressive stenosis of the left internal carotid artery   beginning at its origin. No flow related enhancement of the vessel beyond   its origin.    Normal flow-related enhancement of the bilateral vertebral arteries.    No gross evidence for acute arterial dissection.    IMPRESSION:    CTA brain:  Limited by relatively decreased opacification of the arteries.    Multifocal narrowing of the right skull base ICA due to calcified plaque,   the degree of which is not well evaluated.    Lack of flow related enhancement of the petrous, precavernous, and   cavernous left ICA. Reconstitution of the vessel at its supraclinoid   segment.    Otherwise no flow-limiting stenosis.    No vascular aneurysm or AVM.    CTA neck:  Limited by relatively decreased opacification of the arteries.    Short segment stenosis of the mid left common carotid artery due to the   presence of partially calcified plaque is poorly evaluated. Flow-related   enhancement of the more proximal and distal left common carotid artery is   noted.    Long segment stenosis of the mid and distal right common carotid artery   due to the presence of partially calcified plaque is poorly evaluated.    Rapidly progressive stenosis of the left internal carotid artery   beginning at its origin. No flow related enhancement of the vessel beyond   its origin.    Recommend correlation with contrast-enhanced MRI of the neck for further   evaluation.    --- End of Report ---            MIGUEL ANGEL CARRILLO MD; Attending Radiologist  This document has been electronically signed. May 12 2022  2:47PM    < end of copied text >

## 2022-05-24 NOTE — SWALLOW BEDSIDE ASSESSMENT ADULT - SLP PERTINENT HISTORY OF CURRENT PROBLEM
hx con't Pt admitted for decreased uring output and leg edema, hospital course c/b need for emergent HD, acute strokes (per MRI 5/12) and new R ICA occlusion, and overall persistent AMS (pt declining medication and with periods of agitation),

## 2022-05-24 NOTE — PROVIDER CONTACT NOTE (OTHER) - ASSESSMENT
Pt sleeping at time of event. VS obtained. T 97.2 deg F oral, HR 96, /66, RR 20, spO2 93% on room air. Pt denies pain or discomfort at this time. Pt appears to be asymptomatic Pt sleeping at time of event. VS obtained. T 97.2 deg F oral, HR 96, /66, RR 20, spO2 93% on room air. Pt afib on cardiac monitor. Pt denies pain or discomfort at this time. Pt appears to be asymptomatic

## 2022-05-24 NOTE — PROVIDER CONTACT NOTE (MEDICATION) - RECOMMENDATIONS
RN & family attempt to calm patient & re-educate
Notify NP
SUSAN Calderón made aware
Notify NP
Tylenol IVPB, and Psych Consult
due to risk of aspiration, hold medication lipitor
Notify ACP.
Reschedule medications
Reschedule meds.  Patient to receive dialysis today.  Will add comment in emar and pass on to day shift
Notify Chary Patricia.
SUSAN Calderón made aware
due to risk of aspiration, hold medication cardizem and metoprolol

## 2022-05-24 NOTE — PROGRESS NOTE ADULT - SUBJECTIVE AND OBJECTIVE BOX
Follow-up Pulm Progress Note    pt confused  refusing to eat lunch  denies SOB     Medications:  MEDICATIONS  (STANDING):  allopurinol 100 milliGRAM(s) Oral daily  apixaban 5 milliGRAM(s) Oral two times a day  aspirin  chewable 81 milliGRAM(s) Oral daily  atorvastatin 40 milliGRAM(s) Oral at bedtime  chlorhexidine 4% Liquid 1 Application(s) Topical <User Schedule>  dextrose 50% Injectable 25 Gram(s) IV Push once  dextrose 50% Injectable 25 Gram(s) IV Push once  dextrose 50% Injectable 12.5 Gram(s) IV Push once  epoetin naz-epbx (RETACRIT) Injectable 42042 Unit(s) IV Push once  insulin lispro (ADMELOG) corrective regimen sliding scale   SubCutaneous three times a day before meals  insulin lispro (ADMELOG) corrective regimen sliding scale   SubCutaneous at bedtime  iron sucrose Injectable 100 milliGRAM(s) IV Push once  lidocaine   4% Patch 1 Patch Transdermal daily  metoprolol succinate  milliGRAM(s) Oral daily  multivitamin/minerals 1 Tablet(s) Oral daily  mupirocin 2% Ointment 1 Application(s) Both Nostrils two times a day  polyethylene glycol 3350 17 Gram(s) Oral two times a day  povidone iodine 10% Solution 1 Application(s) Topical daily  senna 2 Tablet(s) Oral at bedtime  sodium chloride 0.9%. 1000 milliLiter(s) (50 mL/Hr) IV Continuous <Continuous>    MEDICATIONS  (PRN):  bisacodyl 5 milliGRAM(s) Oral every 12 hours PRN Constipation  dextrose Oral Gel 15 Gram(s) Oral once PRN Blood Glucose LESS THAN 70 milliGRAM(s)/deciliter  sodium chloride 0.9% lock flush 10 milliLiter(s) IV Push every 1 hour PRN Pre/post blood products, medications, blood draw, and to maintain line patency          Vital Signs Last 24 Hrs  T(C): 36.3 (24 May 2022 05:07), Max: 36.3 (23 May 2022 20:30)  T(F): 97.3 (24 May 2022 05:07), Max: 97.3 (23 May 2022 20:30)  HR: 74 (24 May 2022 05:07) (74 - 81)  BP: 144/61 (24 May 2022 06:22) (132/59 - 174/83)  BP(mean): --  RR: 18 (24 May 2022 05:07) (18 - 18)  SpO2: 95% (24 May 2022 05:07) (95% - 97%)          05-23 @ 07:01  -  05-24 @ 07:00  --------------------------------------------------------  IN: 520 mL / OUT: 0 mL / NET: 520 mL          LABS:                CAPILLARY BLOOD GLUCOSE      POCT Blood Glucose.: 222 mg/dL (24 May 2022 11:23)            Physical Examination:  PULM: Clear to auscultation bilaterally, no significant sputum production  CVS: S1, S2 heard    RADIOLOGY REVIEWED  CXR 5/12: small L effusion    CT chest: < from: CT Chest No Cont (04.04.22 @ 16:52) >  FINDINGS:    AIRWAYS, LUNGS, PLEURA: Tracheal secretions. Small left greater than   right pleural effusions increased compared to 2/19/2022. Right-sided   pleural thickening. Lingular and left greater than right basilar   opacification, likely atelectasis.    MEDIASTINUM: Cardiomegaly. No pericardial effusion. Thoracic aorta normal   caliber.  No large mediastinal lymph nodes.    IMAGED ABDOMEN: Nonspecific perinephric stranding.    SOFT TISSUES: Unremarkable.    BONES: Unremarkable.    IMPRESSION:.    Small left greater than right bilateral pleural effusions increased in   size compared to 2/19/2022.    Lingular and left greater than right basilar opacification, likely   atelectasis.    --- End of Report ---    < end of copied text >

## 2022-05-24 NOTE — SWALLOW BEDSIDE ASSESSMENT ADULT - COMMENTS
4/3 s/p emergent L femoral shiley for emergent HD.    4/12 shliey removed; IJ HD placed  4/17 s/p AVF  4/20 sp permacath   5/4 tachycardic, stabilized (pt  refusing medications prior to this)  5/4 bradycardic (pt refusing medications prior to this)  5/10 Neuro Impressions: Persistent encephalopathy prompted re-consultation on 5/10/22.  MRI brain was ordered and revealed small acute infarctions in bilateral posterior centrum semiovale and left corona radiata and left posterior periventricular white matter. Carotid ultrasound was performed showing new occlusion of the left internal carotid artery, along with greater than 70% stenosis involving the distal right common carotid artery, and mild to moderate flow-limiting stenosis is seen at the left external carotid artery.  Vascular surgery has been consulted and is recommending CTA.  He continues to be encephalopathic and lethargic. Recommendations for vascular surgery consultation for assessment for CEA/carotid stenting.   5/11: Vascular surgery: pt with new R ICA occlusion with reccs for oupatient f/u to reeval carotid patency  5/23 neurosurgery: consulted for R ICA , preop for angio, with possible carotid stent on Wed 5/25. Order received for bedside swallowing evaluation with reports of pt coughing with meals.     ID> UTI and leukocytosis resolved. CXR w/o evidence of focal consolidation and pt w/o respiratory symptoms. signed off.    Pulm > consulted for SOB, now resolved, Pt with hx of hydroPTX. CT chest in 2/2022 with pleural thickening, atelectasis. Findings at the time suspected to be chronic. CT A/P 4/2 with small b/l pl effusions L>R, pleural thickening, then new small b/l pl effusion R>L, bibasilar atelectasis. CXR 5/12 with small L effusion.     pt new to this service 4/3 s/p emergent L femoral shiley for emergent HD.    4/12 shliey removed; IJ HD placed  4/17 s/p AVF  4/20 sp permacath   5/4 tachycardic, stabilized (pt  refusing medications prior to this)  5/4 bradycardic (pt refusing medications prior to this)  5/10 Neuro Impressions: Persistent encephalopathy prompted re-consultation on 5/10/22.  MRI brain was ordered and revealed small acute infarctions in bilateral posterior centrum semiovale and left corona radiata and left posterior periventricular white matter. Carotid ultrasound was performed showing new occlusion of the left internal carotid artery, along with greater than 70% stenosis involving the distal right common carotid artery, and mild to moderate flow-limiting stenosis is seen at the left external carotid artery.  Vascular surgery has been consulted and is recommending CTA.  He continues to be encephalopathic and lethargic. Recommendations for vascular surgery consultation for assessment for CEA/carotid stenting.   5/11: Vascular surgery: pt with new R ICA occlusion with recs for outpt f/u to reeval carotid patency with repeat CTA/MRA given sub-optimal visualization here given reduced pt cooperation.  5/23 neurosurgery: consulted for R ICA , preop for angio, with possible carotid stent on Wed 5/25. Order received for bedside swallowing evaluation with reports of pt coughing with meals.     ID> UTI and leukocytosis resolved. CXR w/o evidence of focal consolidation and pt w/o respiratory symptoms. signed off.    Pulm > consulted for SOB, now resolved, Pt with hx of hydroPTX. CT chest in 2/2022 with pleural thickening, atelectasis. Findings at the time suspected to be chronic. CT A/P 4/2 with small b/l pl effusions L>R, pleural thickening, then new small b/l pl effusion R>L, bibasilar atelectasis. CXR 5/12 with small L effusion.     pt new to this service 4/3 s/p emergent L femoral shiley for emergent HD.    4/12 shliey removed; IJ HD placed  4/17 s/p AVF  4/20 sp permacath   5/4 tachycardic, stabilized (pt  refusing medications prior to this)  5/4 bradycardic (pt refusing medications prior to this)  5/10 Neuro Impressions: Persistent encephalopathy prompted re-consultation on 5/10/22.  MRI brain was ordered and revealed small acute infarctions in bilateral posterior centrum semiovale and left corona radiata and left posterior periventricular white matter. Carotid ultrasound was performed showing new occlusion of the left internal carotid artery, along with greater than 70% stenosis involving the distal right common carotid artery, and mild to moderate flow-limiting stenosis is seen at the left external carotid artery. NIH 13 Vascular surgery has been consulted and is recommending CTA.  He continues to be encephalopathic and lethargic. Recommendations for vascular surgery consultation for assessment for CEA/carotid stenting.   5/11: Vascular surgery: pt with new R ICA occlusion with recs for outpt f/u to reeval carotid patency with repeat CTA/MRA given sub-optimal visualization here given reduced pt cooperation.  5/23 neurosurgery: consulted for R ICA , preop for angio, with possible carotid stent on Wed 5/25. Order received for bedside swallowing evaluation with reports of pt coughing with meals.     ID> UTI and leukocytosis resolved. CXR w/o evidence of focal consolidation and pt w/o respiratory symptoms. signed off.    Pulm > consulted for SOB, now resolved, Pt with hx of hydroPTX. CT chest in 2/2022 with pleural thickening, atelectasis. Findings at the time suspected to be chronic. CT A/P 4/2 with small b/l pl effusions L>R, pleural thickening, then new small b/l pl effusion R>L, bibasilar atelectasis. CXR 5/12 with small L effusion.     pt new to this service

## 2022-05-24 NOTE — PROGRESS NOTE ADULT - SUBJECTIVE AND OBJECTIVE BOX
CARDIOLOGY FOLLOW UP - Dr. Mazariegos  DATE OF SERVICE: 5/24/22     CC no cp or  sob         REVIEW OF SYSTEMS:  CONSTITUTIONAL: No fever, weight loss, or fatigue  RESPIRATORY: No cough, wheezing, chills or hemoptysis; No Shortness of Breath  CARDIOVASCULAR: No chest pain, palpitations, passing out, dizziness, or leg swelling  GASTROINTESTINAL: No abdominal or epigastric pain. No nausea, vomiting, or hematemesis; No diarrhea or constipation. No melena or hematochezia.  VASCULAR: No edema     PHYSICAL EXAM:  T(C): 36.3 (05-24-22 @ 05:07), Max: 36.4 (05-23-22 @ 13:06)  HR: 74 (05-24-22 @ 05:07) (74 - 101)  BP: 144/61 (05-24-22 @ 06:22) (132/59 - 174/83)  RR: 18 (05-24-22 @ 05:07) (18 - 18)  SpO2: 95% (05-24-22 @ 05:07) (95% - 97%)  Wt(kg): --  I&O's Summary    23 May 2022 07:01  -  24 May 2022 07:00  --------------------------------------------------------  IN: 520 mL / OUT: 0 mL / NET: 520 mL        Appearance: Normal	  Cardiovascular: Normal S1 S2, irreg   Respiratory: Lungs clear to auscultation	  Gastrointestinal:  Soft, Non-tender, + BS	  Extremities: Normal range of motion, No clubbing, cyanosis or edema      Home Medications:  allopurinol 100 mg oral tablet: 1 tab(s) orally once a day (03 Apr 2022 06:34)  aspirin 81 mg oral delayed release tablet: 1 tab(s) orally once a day (03 Apr 2022 06:34)  bumetanide 2 mg oral tablet: 1 tab(s) orally once a day (03 Apr 2022 06:34)  insulin glargine 100 units/mL subcutaneous solution: 25 unit(s) subcutaneous once a day (at bedtime) (03 Apr 2022 06:34)  metOLazone 5 mg oral tablet: 1 tab(s) orally 3 times a week (03 Apr 2022 06:34)  metoprolol succinate 50 mg oral tablet, extended release: 2 tab(s) orally once a day (03 Apr 2022 06:34)  NovoLOG 100 units/mL subcutaneous solution: subcutaneous 4 times a day (before meals and at bedtime) (03 Apr 2022 06:34)  NovoLOG FlexPen 100 units/mL injectable solution: 10 unit(s) subcutaneous 3 times a day (03 Apr 2022 06:34)  oxycodone-acetaminophen 5 mg-325 mg oral tablet: 1 tab(s) orally 2 times a day (03 Apr 2022 06:34)  Pradaxa 150 mg oral capsule: 1 cap(s) orally 2 times a day (03 Apr 2022 06:34)  pregabalin 100 mg oral capsule: 1 cap(s) orally 3 times a day (03 Apr 2022 06:34)      MEDICATIONS  (STANDING):  allopurinol 100 milliGRAM(s) Oral daily  apixaban 5 milliGRAM(s) Oral two times a day  aspirin  chewable 81 milliGRAM(s) Oral daily  atorvastatin 40 milliGRAM(s) Oral at bedtime  chlorhexidine 4% Liquid 1 Application(s) Topical <User Schedule>  dextrose 50% Injectable 25 Gram(s) IV Push once  dextrose 50% Injectable 25 Gram(s) IV Push once  dextrose 50% Injectable 12.5 Gram(s) IV Push once  diltiazem    milliGRAM(s) Oral daily  insulin lispro (ADMELOG) corrective regimen sliding scale   SubCutaneous three times a day before meals  insulin lispro (ADMELOG) corrective regimen sliding scale   SubCutaneous at bedtime  lidocaine   4% Patch 1 Patch Transdermal daily  metoprolol succinate  milliGRAM(s) Oral daily  multivitamin/minerals 1 Tablet(s) Oral daily  mupirocin 2% Ointment 1 Application(s) Both Nostrils two times a day  polyethylene glycol 3350 17 Gram(s) Oral two times a day  povidone iodine 10% Solution 1 Application(s) Topical daily  senna 2 Tablet(s) Oral at bedtime  sodium chloride 0.9%. 1000 milliLiter(s) (50 mL/Hr) IV Continuous <Continuous>  sodium chloride 0.9%. 250 milliLiter(s) (250 mL/Hr) IV Continuous <Continuous>  sodium chloride 0.9%. 1000 milliLiter(s) (50 mL/Hr) IV Continuous <Continuous>  sodium chloride 0.9%. 1000 milliLiter(s) (40 mL/Hr) IV Continuous <Continuous>      TELEMETRY:afib hr  	    ECG:  	  RADIOLOGY:   DIAGNOSTIC TESTING:  [ ] Echocardiogram:  [ ]  Catheterization:  [ ] Stress Test:    OTHER: 	    LABS:

## 2022-05-24 NOTE — PROGRESS NOTE ADULT - SUBJECTIVE AND OBJECTIVE BOX
Patient seen and examined at bedside.    --Anticoagulation--  aspirin  chewable 81 milliGRAM(s) Oral daily    T(C): 36.3 (05-24-22 @ 05:07), Max: 36.4 (05-23-22 @ 13:06)  HR: 74 (05-24-22 @ 05:07) (74 - 101)  BP: 174/83 (05-24-22 @ 05:07) (132/59 - 174/83)  RR: 18 (05-24-22 @ 05:07) (18 - 18)  SpO2: 95% (05-24-22 @ 05:07) (95% - 97%)  Wt(kg): --    Exam: Awake, no verbal outpt, Ox0 (occasionally Ox1-2 per nurse), intermittently FC/mimics, EOMI, PERRL, ROA spontaneously L>R   Patient seen and examined at bedside.    --Anticoagulation--  aspirin  chewable 81 milliGRAM(s) Oral daily    T(C): 36.3 (05-24-22 @ 05:07), Max: 36.4 (05-23-22 @ 13:06)  HR: 74 (05-24-22 @ 05:07) (74 - 101)  BP: 174/83 (05-24-22 @ 05:07) (132/59 - 174/83)  RR: 18 (05-24-22 @ 05:07) (18 - 18)  SpO2: 95% (05-24-22 @ 05:07) (95% - 97%)  Wt(kg): --    Exam: AOx2, FC, ROA AG L>R

## 2022-05-24 NOTE — PROVIDER CONTACT NOTE (MEDICATION) - DATE AND TIME:
13-May-2022 06:45
23-Apr-2022 22:10
29-Apr-2022 00:00
06-Apr-2022 11:10
22-May-2022 17:15
20-Apr-2022 08:40
26-Apr-2022 04:00
24-May-2022 06:29
05-Apr-2022 06:56
09-Apr-2022 03:45
15-May-2022 07:00
15-May-2022 01:00
04-Apr-2022 23:00

## 2022-05-24 NOTE — PROVIDER CONTACT NOTE (MEDICATION) - ASSESSMENT
No s/s of bleeding
Pt DALY&Ox2-3, VSS.
Pt A&Ox2, VSS, no cp, sob, dizziness.
Pt refusing bactroban ointment for MRSA
Patient is A+O x3, VSS, came back from dialysis and refused to take his medications. Patient was given education on importance of medication compliance and still refused.
elevated BP. a&ox1, screaming with wife at bedside.
pt a&o 2-3. bp 137/69. SPO2 92. temp 97.5
Pt A&Ox0 lethargic, unable to follow command to swallow medication
Pt A&Ox1 lethargic, unable to follow command to swallow medication
Pt refusing bactroban ointment for MRSA indication
patient appears comfortable but confused
Pt A+Ox1. No s/s of pain noted.
Patient is A+O x2, VSS, came back from dialysis. Spouse was at bedside. Pt had a large BM in the AM as well as 3x BM the day before per spouse.

## 2022-05-24 NOTE — PROGRESS NOTE ADULT - SUBJECTIVE AND OBJECTIVE BOX
DATE OF SERVICE: 05-24-22 @ 11:06  CHIEF COMPLAINT:Patient is a 67y old  Male who presents with a chief complaint of Decreased urine output for the past week, leg oedema (24 May 2022 10:49)    	        PAST MEDICAL & SURGICAL HISTORY:  HTN (hypertension), benign      HLD (hyperlipidemia)      DM type 2 (diabetes mellitus, type 2)      TIA (transient ischemic attack)      Atrial fibrillation      MI (myocardial infarction)  circa 2014 and 2022.      CAD (coronary artery disease)      Neuropathy      Stage 3 chronic kidney disease      2019 novel coronavirus disease (COVID-19)  12/2021.  Received the COVID-19 vaccine x 2 as of April 2022.      Status post angioplasty with stent  LULU x 3 2/7/2014      S/P carotid endarterectomy  left              REVIEW OF SYSTEMS:    RESPIRATORY: No cough, wheezing, chills or hemoptysis; No Shortness of Breath  CARDIOVASCULAR: No chest pain, palpitations, passing out,  GASTROINTESTINAL: No abdominal or epigastric pain. No nausea, vomiting,   GENITOURINARY: No dysuria, frequency, hematuria, or incontinence  NEUROLOGICAL: No headaches,     Medications:  MEDICATIONS  (STANDING):  allopurinol 100 milliGRAM(s) Oral daily  apixaban 5 milliGRAM(s) Oral two times a day  aspirin  chewable 81 milliGRAM(s) Oral daily  atorvastatin 40 milliGRAM(s) Oral at bedtime  chlorhexidine 4% Liquid 1 Application(s) Topical <User Schedule>  dextrose 50% Injectable 25 Gram(s) IV Push once  dextrose 50% Injectable 25 Gram(s) IV Push once  dextrose 50% Injectable 12.5 Gram(s) IV Push once  epoetin naz-epbx (RETACRIT) Injectable 88902 Unit(s) IV Push once  insulin lispro (ADMELOG) corrective regimen sliding scale   SubCutaneous three times a day before meals  insulin lispro (ADMELOG) corrective regimen sliding scale   SubCutaneous at bedtime  iron sucrose Injectable 100 milliGRAM(s) IV Push once  lidocaine   4% Patch 1 Patch Transdermal daily  metoprolol succinate  milliGRAM(s) Oral daily  multivitamin/minerals 1 Tablet(s) Oral daily  mupirocin 2% Ointment 1 Application(s) Both Nostrils two times a day  polyethylene glycol 3350 17 Gram(s) Oral two times a day  povidone iodine 10% Solution 1 Application(s) Topical daily  senna 2 Tablet(s) Oral at bedtime  sodium chloride 0.9%. 1000 milliLiter(s) (50 mL/Hr) IV Continuous <Continuous>    MEDICATIONS  (PRN):  bisacodyl 5 milliGRAM(s) Oral every 12 hours PRN Constipation  dextrose Oral Gel 15 Gram(s) Oral once PRN Blood Glucose LESS THAN 70 milliGRAM(s)/deciliter  sodium chloride 0.9% lock flush 10 milliLiter(s) IV Push every 1 hour PRN Pre/post blood products, medications, blood draw, and to maintain line patency    	    PHYSICAL EXAM:  T(C): 36.3 (05-24-22 @ 05:07), Max: 36.4 (05-23-22 @ 13:06)  HR: 74 (05-24-22 @ 05:07) (74 - 101)  BP: 144/61 (05-24-22 @ 06:22) (132/59 - 174/83)  RR: 18 (05-24-22 @ 05:07) (18 - 18)  SpO2: 95% (05-24-22 @ 05:07) (95% - 97%)  Wt(kg): --  I&O's Summary    23 May 2022 07:01  -  24 May 2022 07:00  --------------------------------------------------------  IN: 520 mL / OUT: 0 mL / NET: 520 mL        Appearance: Normal	  HEENT:   Normal oral mucosa, PERRL, EOMI	    Cardiovascular: Normal S1 S2  Respiratory: Lungs clear to auscultation	  Psychiatry: A & O  Gastrointestinal:  Soft, Non-tender, + BS	  Skin: No rashes, No ecchymoses, No cyanosis	  Neurologic:dec rom   TELEMETRY: 	    ECG:  	  RADIOLOGY:  OTHER: 	  	  LABS:	 	    CARDIAC MARKERS:                  proBNP:   Lipid Profile:   HgA1c:   TSH:

## 2022-05-24 NOTE — PROVIDER CONTACT NOTE (MEDICATION) - REASON
Patient refused medications
During bedside HD pt .
patient woke up confused c/o generalized pain 10/10
Mupirocin 2% ointment refusal
Pt unable to tolerate PO med
Pt unable to tolerate PO med
Pt unable to tolerate miralax
Heparin drip
Patient refusing Eliquis, Cardizem and metoprolol
Pt and family refused senna and Miralax
Rescheduling BP and Cardiac meds for Dialysis
patient agitated, screaming & refusing PO medications at this time
Bactroban 2% ointment refusal

## 2022-05-24 NOTE — PROGRESS NOTE ADULT - PROVIDER SPECIALTY LIST ADULT
Medical Week 2 Survey    Flowsheet Row Responses   [facility] facility patient discharged from? [place]   Does the patient have one of the following disease processes/diagnoses(primary or secondary)? Stroke (TIA)   Week 2 attempt successful? No   Unsuccessful attempts Attempt 1          PRASHANTH BUITRAGO - Registered Nurse   Neurosurgery

## 2022-05-24 NOTE — PROVIDER CONTACT NOTE (MEDICATION) - NAME OF MD/NP/PA/DO NOTIFIED:
Emelin Reyes PA
NP Bruce Davalos
SUSAN Calderón
Judy DAVIS
JOYCE Steinberg
SUSAN Calderón
Alyssa DAVIS
Joselin Fall, NP
Katie King, PA
Brianna Peña NP
Chary Patricia
Michele Kincaid
Judy DAVIS

## 2022-05-24 NOTE — PROGRESS NOTE ADULT - ASSESSMENT
Impression:  This is a 67 year old gentleman pmhx CAD s/p MI/PCI/CABG, hx TIA, afib on rivaroxaban, HTN, DM2, PVD, gout, L CEA 2014, and CKD who presented to [facility] 4/11/22 with ANT, tremors, confusion and lethargy.  CT head no acute changes.  S/p initiation of hemodialysis.  Mental status has improved dramatically.  Pt denies any headaches, no slurred speech, no hemiparesis.  He has chronic gout with bilateral finger swelling, pain.  He also has chronic neuropathy.  Both legs are weak.      Persistent encephalopathy prompted re-consultation on 5/10/22.  MRI brain was ordered and revealed small acute infarctions in bilateral posterior centrum semiovale and left corona radiata and left posterior periventricular white matter.  Carotid ultrasound was performed showing new occlusion of the left internal carotid artery, along with greater than 70% stenosis involving the distal right common carotid artery, and mild to moderate flow-limiting stenosis is seen at the left external carotid artery.  Vascular surgery has been consulted and is recommending CTA.  He continues to be encephalopathic and lethargic.      Diagnosis and Recommendations:  1. At this time suspect delirium may be related to volume depletion. Wife and nursing report no PO intake for days. Hasn't been compliant with medications and missed medication dosing.    -Advise either IV fluids or NGT.   - Convert ASA to chewable/crushed  - Monitor for malnutrition  - Daughter resistant to NGT placement at this time    2. Complete left ICA occlusion and 70% distal CCA stenosis. Has collateral flow via acomm and pcomm but should be re-evaluated for either CEA or carotid stenting. CEA may be ideal considering issues with medication compliance and potential difficulty adhering to DAPT regimen but defer to proceduralists regarding this.  Seems potential plan is for stent tomorrow.  Initial feeling was to wait for delirium to resolve but unclear when or if that will happen.  Discussed extensively with son at bedside and daughter via FaceTime: all options here have risk associated with them.  Opening the artery may or may not help.  However, it will reduce long-term stroke risk.  It might help with his mental status by helping brain perfusion, though this cannot be guaranteed.  There is risk associated with procedure.        3. Reviewed MRI - CVAs in bilateral watershed territory which can potentially contribute to delirium, those these CVAs do not appear particularly large.      - MRA was suboptimal not visualizing the neck but MRA head showed left KESHAWN coming from ACOM and left PCA feeding left MCA.  ICA showed no flow.    - in setting of atrial fibrillation infarcts could potentially also be cardioembolic, continue anticoagulation as per cardiology  - s/p carotid doppler, suboptimal CTA/MRA.  However, it is apparent that there is a proximal left ICA occlusion, which would not be a candidate for intervention.  Intervention for right CCA/ICA stenosis would be high risk but it may indeed by a symptomatic stenosis.   - ECHO in February 2022 did not reveal severe cardiomyopathy, vegetation, or LV thrombus.    - HgA1c of 11 also driving stroke risk.  Goal is < 7  - continue high-intensity statin therapy    will follow

## 2022-05-24 NOTE — PROGRESS NOTE ADULT - ASSESSMENT
·  Problem:[email or web address].co hemodialysis per renal  to cont as per renal  f/u labs      Problem/Plan - 2:  ·  Problem: Chronic atrial fibrillation. c/w a/c  cards f/u     Problem/Plan - 3:  ·  Problem: Cystitis.   ID follow up appreciated        Problem/Plan - 4:  ·  Problem: Type 2 diabetes mellitus. c/w insulin   endo f/u      Problem/Plan - 5:  ·  Problem: CAD (coronary artery disease).   rapid a fib  meds adjusted  hr stable  c/w a/c       Persistent encephalopathy   neuro follow up appreciated  f/u labs   mental status better  polypharmacy contributing and volume status   minimizing meds     carotid duplex noted  cta noted  mra/ mri noted  tx plans as per vasc / neuro   neuro f/u noted  neuro sx to do carotid stent   await family decision as discussed at length w/ pt/ son         spoke w/ son at bedside

## 2022-05-24 NOTE — PROGRESS NOTE ADULT - SUBJECTIVE AND OBJECTIVE BOX
[facility]   [phone]  RENAL FOLLOW UP NOTE  --------------------------------------------------------------------------------  Chief Complaint:    24 hour events/subjective:    patient seen and examined on HD  awake    PAST HISTORY  --------------------------------------------------------------------------------  No significant changes to PMH, PSH, FHx, SHx, unless otherwise noted    ALLERGIES & MEDICATIONS  --------------------------------------------------------------------------------  Allergies    nitrofurantoin (Nephrotoxicity)    Intolerances      Standing Inpatient Medications  allopurinol 100 milliGRAM(s) Oral daily  apixaban 5 milliGRAM(s) Oral two times a day  aspirin  chewable 81 milliGRAM(s) Oral daily  atorvastatin 40 milliGRAM(s) Oral at bedtime  chlorhexidine 4% Liquid 1 Application(s) Topical <User Schedule>  dextrose 50% Injectable 25 Gram(s) IV Push once  dextrose 50% Injectable 25 Gram(s) IV Push once  dextrose 50% Injectable 12.5 Gram(s) IV Push once  epoetin naz-epbx (RETACRIT) Injectable 99180 Unit(s) IV Push once  insulin lispro (ADMELOG) corrective regimen sliding scale   SubCutaneous three times a day before meals  insulin lispro (ADMELOG) corrective regimen sliding scale   SubCutaneous at bedtime  iron sucrose Injectable 100 milliGRAM(s) IV Push once  lidocaine   4% Patch 1 Patch Transdermal daily  metoprolol succinate  milliGRAM(s) Oral daily  multivitamin/minerals 1 Tablet(s) Oral daily  mupirocin 2% Ointment 1 Application(s) Both Nostrils two times a day  polyethylene glycol 3350 17 Gram(s) Oral two times a day  povidone iodine 10% Solution 1 Application(s) Topical daily  senna 2 Tablet(s) Oral at bedtime  sodium chloride 0.9%. 1000 milliLiter(s) IV Continuous <Continuous>    PRN Inpatient Medications  bisacodyl 5 milliGRAM(s) Oral every 12 hours PRN  dextrose Oral Gel 15 Gram(s) Oral once PRN  sodium chloride 0.9% lock flush 10 milliLiter(s) IV Push every 1 hour PRN      REVIEW OF SYSTEMS  --------------------------------------------------------------------------------      Resp: denies SOB/Cough  : Denies dysuria    VITALS/PHYSICAL EXAM  --------------------------------------------------------------------------------  T(C): 36.4 (05-24-22 @ 13:55), Max: 36.4 (05-24-22 @ 13:55)  HR: 74 (05-24-22 @ 13:55) (74 - 104)  BP: 116/60 (05-24-22 @ 13:55) (116/60 - 174/83)  RR: 17 (05-24-22 @ 13:55) (17 - 18)  SpO2: 94% (05-24-22 @ 13:55) (94% - 97%)  Wt(kg): --        05-23-22 @ 07:01  -  05-24-22 @ 07:00  --------------------------------------------------------  IN: 520 mL / OUT: 0 mL / NET: 520 mL      Physical Exam:  	              Gen:  confused, awake   	Pulm: Decreased breath sounds b/l bases.  	CV: No JVD. IRR  	Abd: +BS, soft, nontender, softly distended, obese               Extremity no edema              Extremity LE: + hyperpigmented bilateral LE with no edema              Vascular: R IJ HD catheter, L arm AVF     LABS/STUDIES  --------------------------------------------------------------------------------              7.3    10.53 >-----------<  330      [05-24-22 @ 14:22]              25.4     135  |  96  |  41  ----------------------------<  215      [05-24-22 @ 14:22]  4.2   |  25  |  6.84        Ca     8.6     [05-24-22 @ 14:22]      Mg     2.3     [05-24-22 @ 14:22]      Phos  4.2     [05-24-22 @ 14:22]    TPro  6.4  /  Alb  3.2  /  TBili  0.3  /  DBili  x   /  AST  8   /  ALT  <5  /  AlkPhos  132  [05-24-22 @ 14:22]          Creatinine Trend:  SCr 6.84 [05-24 @ 14:22]  SCr 5.94 [05-21 @ 07:23]  SCr 6.79 [05-19 @ 13:49]  SCr 7.34 [05-17 @ 05:54]  SCr 5.98 [05-16 @ 06:28]                  Iron 21, TIBC 245, %sat 9      [05-17-22 @ 05:54]  Ferritin 120      [05-17-22 @ 05:54]  PTH -- (Ca 8.3)      [04-15-22 @ 12:18]   150  PTH -- (Ca --)      [04-03-22 @ 23:06]   97  HbA1c 11.7      [11-07-19 @ 09:14]  TSH 1.71      [05-12-22 @ 07:11]

## 2022-05-24 NOTE — PROGRESS NOTE ADULT - NS ATTEND AMEND GEN_ALL_CORE FT
seen on hd   heart RRR lungs decrease bs no rales  ext no edema     ESRD   HD as planned above   see hd flow sheet   dc ivf

## 2022-05-24 NOTE — SWALLOW BEDSIDE ASSESSMENT ADULT - SWALLOW EVAL: DIAGNOSIS
Pt is a 67yoM admitted with urine retention and leg edema, found to have new acute infarcts on MRI 5/12. Pt p/w overtly functional oropharyngeal swallow. Pt with prolonged, yet functional mastication, likely attributed to limited dentition, with timely initiation of swallow and no overt s/s of aspiration at the bedside, however, given fluctuating mental status and recent acute infarcts, this service will continue to follow for diet monitor and further assessment of dysphagia w/u with objective testing as indicated. Pt is a 67yoM admitted with urine retention and leg edema, found to have new acute infarcts on MRI 5/12. Pt p/w overtly functional oropharyngeal swallow, however, may be affected by fluctuating mental status (pt reported to have sudden periods of confusion/agitation/impulsivity). Pt with prolonged, yet functional mastication, likely attributed to limited dentition, with timely initiation of swallow and no overt s/s of aspiration at the bedside, however, given fluctuating mental status and recent acute infarcts, this service will continue to follow for diet monitor and further assessment of dysphagia w/u with objective testing as indicated.

## 2022-05-24 NOTE — PROGRESS NOTE ADULT - ASSESSMENT
Echo 5/13/22:  1. Normal left ventricular internal dimensions and wall  thicknesses.  2. Endocardial visualization enhanced with intravenous  injection of Ultrasonic Enhancing Agent (Lumason). Grossly  borderline normal left ventricular systolic function; no  obvious segmental wall motion abnormality.  3. The right ventricle is not well visualized.  *** Compared with echocardiogram of 2/22/2022, no  significant changes noted.      A/P    66 y/o M with history of CAD, s/p multiple PCI, with most recent 2/22 PCI of LAD in setting of NSTEMI, on ASA only (pradaxa), chronic atrial fibrillation maintained on Pradaxa, essential HTN, type 2 DM previously on oral medications but on insulin, diabetic neuropathy with chronic RIGHT LE venous stasis changes>left, LEFT foot ulcer seen by podiatry, past endarterectomy LEFT CEA in 2014 presenting with 1 week of decreased urine output, cystitis with hematuria     #ANT on CKD, new HD  -oliguric renal failure in setting of UTI/cystitis  -New HD for uremia, renal failure  -sp rrt 4/12 for uncontrolled bleeding sp  right groin shiley removal  - monitor h/h - stable  -s/p L AVG 4/18  -s/p permacath placement 4/20  -renal f/ u    #AMS/Lethargy  -recent ams from pain meds  -AMS sp RRT 4/27  likely secondary to pain med   -repeat CT 4/27 unremarkable --  discontinued Percocet  -MRI 5/11  revealed small acute infarctions in bilateral posterior centrum semiovale and left corona radiata and left posterior periventricular white matter  -on asa statin  -repeat echo with no interval changes  -CTa head neck noted; neuro following     #Acute on chronic HFpEF  -stable  -continue volume removal with HD  -c/w bb  -repeat echo with stable borderline lv fxn    #Chronic AF  -rates mildly elevated  - increase cardizem to 240 mg daily   -c/w metoprolol succ 100 mg qd  -c/w eliquis 5 mg BID for now - heme stable     #HTN  -stable  -c/w meds       #CAD, s/p PCI, most recent 2/2022  -stable, cont asa  -off plavix due to a/c indication  -statin     #carotid stenosis   -previous MRA  noted with Greater than 75% stenosis of the right distal common carotid artery. Approximately 60% stenosis of the proximal left internal carotid artery.  -carotid dopplers 5/11 noted :  There is new occlusion of the left internal carotid artery;  greater than 70% stenosis involving the distal right common carotid artery as was seen previously. Mild to moderate flow-limiting stenosis is seen at the left external   carotid artery.  -CTa head and neck 5/12 noted  -Continue ASA and Statin Therapy  -neuro fu  / work up / imaging per vascular   -mra head/ neck noted- per vascular outpt follow up for repeat to re eval w MRI / mra of the brain neck   -neurosx eval noted _ planning angio, possible stent tomorrow

## 2022-05-24 NOTE — PROVIDER CONTACT NOTE (MEDICATION) - BACKGROUND
Pt admitted for ANT
Pt is admitted for ANT on CKD. PMH of MI, TIA, DM2
Chronic Kidney disease, Afib- Heparin infusion and Diltiazem infusion, DM2
Patient admitted for ANT on CKD
Pt admitted for ANT
Pt on patient specific Heparin protocol
Dx ANT on CKD
pt admitted for ANT
Patient is a 68 y/o male admitted diagnosis of chronic kidney disease.
Patient is a 68 y/o male admitted diagnosis of chronic kidney disease.
Pt came in for ANT on CKD, UTI, and afib. PMH of Stage 3 kidney disease, type2 DM, HLD, and HTN.
Pt came in for Le[place] on CKD, and UTI. PMH of afib, type 2 DM, HLD, HTN.
Pt admitted diagnosis CKD. pt has a pmh T2DM on insulin with complicating peripheral neuropathy, past cva, left cea no residual deficits.

## 2022-05-24 NOTE — PROGRESS NOTE ADULT - ASSESSMENT
67M, CVA w/o deficits s/p 2014 Left CEA w/ Vasc Surg Dr. Pinto, CAD, T2DM, AF. On ASA81, Eliquis. Admitted 4/3 for lethargy, found to have renal failure, now on HD. CTA on 5/12 shows Left ICA proximal occlusion with distal IC reconstitution. MRI shows b/l centrum semiovale, lacunar Left corona radiata infarcts. Carotid u/s also shows Right CCA >70% stenosis. Exam: Awake, no verbal outpt, Ox0 (occasionally Ox1-2 per nurse), intermittently FC/mimics, EOMI, PERRL, RAO spontaneously L>R  -Admitted to Medicine  -Preop for Angio, possible stent tomorrow  -Cr 5.94, would benefit from HD on Tues and Thurs  -Needs Medicine, Renal, Medical clearance documented  -Needs preop labs: Type and Screen x2, INR/PT/PTT

## 2022-05-24 NOTE — PROGRESS NOTE ADULT - ASSESSMENT
67 year old gentleman with PMH of CAD, NSTEMI s/p 3 stents in 2014 (stents to LAD, proximal and distal LCx), ischemic cardiomyopathy, HTN for 10 years, T2DM for 26 years c/b peripheral neuropathy, CVA, TIA, Afib on Pradaxa, chronic RLE wound, L carotid stenosis s/p endarterectomy (2014) just recently admitted  to the [facility] on 2/19/2022 with acute onset chest pain for one day found to have an NSTEMI, CAD, s/p PCI in setting of increased AF rates off bb for 3 days and resolved chest pain, his CKMB peaked and Echo noted with EF 60%,normal LV function, mild TR, mild HI. He was s/p [facility] with 85% proximal LAD s/p balloon angioplasty and LULU. He presented to [facility] ED with decreased urine output over the week. Mr. Stevens went to city MD for hematuria and was started  on Nitrofurantoin. Pt is still taking Nitrofurantoin w/ 5 days left. He came to the ED due to worsening symptoms. Pt reports having a similar incident 4-5 years ago that resolved spontaneously. He reports increased leg edema Dyspnea worse on exertion. his baseline Serum creatinine is in the 2.0 to 2.3 mg/dl range. ANT with serum creatinine of 8.29 with bun 144 and k 5.5      1- ANT on ckd III ---> ESRD likely   2- chf chronic   3- hyperphosphatemia /shpt   4- anemia   5- hyperlipidemia   6- HTN /a fib  7- TIA hx   8- hx of carotid stenosis      HD today  F200, 225 min, , , no fluid removal  see HD flowsheet  renvela on hold, phos normal range  off hydralazine due to orthostatic hypotension  gentle IVF hydration NS 50 cc/24 hours  cardizem increased per cards  continue metoprolol  calorie count  anemia - epogen 01181 Units TIW with HD.    venofer 100 mg IVP with HD  trend hgb  PT

## 2022-05-24 NOTE — CHART NOTE - NSCHARTNOTEFT_GEN_A_CORE
RD consult received for calorie count on 5/23.    Calorie Count initiated by team on the evening of 5/23. RD to follow up with results upon completion.    Radha Huerta, AVRILN, CDN Pager #[phone]

## 2022-05-24 NOTE — PROVIDER CONTACT NOTE (MEDICATION) - ACTION/TREATMENT ORDERED:
SUSAN Calderón made aware
Metoprolol ivpush ordered.
NP notified.
SUSAN Calderón made aware. No orders to follow. Will continue to monitor.
hold lipitor.
reschedule medication to 8am and re-eval.
PA made aware. Continue current rate at 42 ml/hr. Will continue to monitor
SUSAN Shah said okay to reschedule BP and cardiac medications if VSS. Medications include Cardizem, hydralazine and metoprolol.
Will continue to monitor
ACP notified.
NP aware. hold medication at this time
IV Tylenol 1gm ordered and given
Will contact with furthers orders. Will continue to monitor patient

## 2022-05-25 NOTE — PROGRESS NOTE ADULT - SUBJECTIVE AND OBJECTIVE BOX
DATE OF SERVICE: 05-25-22 @ 13:23  CHIEF COMPLAINT:Patient is a 67y old  Male who presents with a chief complaint of Decreased urine output for the past week, leg oedema (25 May 2022 09:06)    	        PAST MEDICAL & SURGICAL HISTORY:  HTN (hypertension), benign      HLD (hyperlipidemia)      DM type 2 (diabetes mellitus, type 2)      TIA (transient ischemic attack)      Atrial fibrillation      MI (myocardial infarction)  circa 2014 and 2022.      CAD (coronary artery disease)      Neuropathy      Stage 3 chronic kidney disease      2019 novel coronavirus disease (COVID-19)  12/2021.  Received the COVID-19 vaccine x 2 as of April 2022.      Status post angioplasty with stent  LULU x 3 2/7/2014      S/P carotid endarterectomy  left              weak  RESPIRATORY: No cough, wheezing, chills or hemoptysis; No Shortness of Breath  CARDIOVASCULAR: No chest pain, palpitations, passing out, dizziness, or leg swelling  GASTROINTESTINAL: No abdominal or epigastric pain. No nausea, vomiting,   GENITOURINARY: No dysuria, frequency, hematuria,   NEUROLOGICAL: No headaches,     Medications:  MEDICATIONS  (STANDING):  allopurinol 100 milliGRAM(s) Oral daily  apixaban 5 milliGRAM(s) Oral two times a day  aspirin  chewable 81 milliGRAM(s) Oral daily  atorvastatin 40 milliGRAM(s) Oral at bedtime  chlorhexidine 4% Liquid 1 Application(s) Topical <User Schedule>  dextrose 50% Injectable 25 Gram(s) IV Push once  dextrose 50% Injectable 25 Gram(s) IV Push once  dextrose 50% Injectable 12.5 Gram(s) IV Push once  diltiazem    milliGRAM(s) Oral daily  insulin lispro (ADMELOG) corrective regimen sliding scale   SubCutaneous three times a day before meals  insulin lispro (ADMELOG) corrective regimen sliding scale   SubCutaneous at bedtime  lidocaine   4% Patch 1 Patch Transdermal daily  metoprolol succinate  milliGRAM(s) Oral daily  multivitamin/minerals 1 Tablet(s) Oral daily  mupirocin 2% Ointment 1 Application(s) Both Nostrils two times a day  polyethylene glycol 3350 17 Gram(s) Oral two times a day  povidone iodine 10% Solution 1 Application(s) Topical daily  senna 2 Tablet(s) Oral at bedtime  sodium chloride 0.9%. 1000 milliLiter(s) (50 mL/Hr) IV Continuous <Continuous>    MEDICATIONS  (PRN):  bisacodyl 5 milliGRAM(s) Oral every 12 hours PRN Constipation  dextrose Oral Gel 15 Gram(s) Oral once PRN Blood Glucose LESS THAN 70 milliGRAM(s)/deciliter  sodium chloride 0.9% lock flush 10 milliLiter(s) IV Push every 1 hour PRN Pre/post blood products, medications, blood draw, and to maintain line patency    	    PHYSICAL EXAM:  T(C): 36.5 (05-25-22 @ 11:22), Max: 36.8 (05-24-22 @ 18:30)  HR: 79 (05-25-22 @ 11:22) (74 - 97)  BP: 155/73 (05-25-22 @ 11:22) (116/60 - 158/66)  RR: 18 (05-25-22 @ 11:22) (17 - 22)  SpO2: 93% (05-25-22 @ 11:22) (91% - 94%)  Wt(kg): --  I&O's Summary    24 May 2022 07:01  -  25 May 2022 07:00  --------------------------------------------------------  IN: 0 mL / OUT: 0 mL / NET: 0 mL    25 May 2022 07:01  -  25 May 2022 13:23  --------------------------------------------------------  IN: 100 mL / OUT: 0 mL / NET: 100 mL        Appearance: Normal	  HEENT:   Normal oral mucosa, PERRL, EOMI	  Lymphatic: No lymphadenopathy  Cardiovascular: reg irreg  Respiratory: Lungs clear to auscultation	    Gastrointestinal:  Soft, Non-tender, + BS	    Neurologic: Non-focal  Extremities: pvd   dec rom     TELEMETRY: 	    ECG:  	  RADIOLOGY:  OTHER: 	  	  LABS:	 	    CARDIAC MARKERS:                                8.2    9.24  )-----------( 328      ( 25 May 2022 11:51 )             29.0     05-25    136  |  97  |  22  ----------------------------<  177<H>  3.9   |  24  |  4.33<H>    Ca    8.8      25 May 2022 11:51  Phos  4.2     05-24  Mg     2.3     05-24    TPro  6.4  /  Alb  3.2<L>  /  TBili  0.3  /  DBili  x   /  AST  8<L>  /  ALT  <5<L>  /  AlkPhos  132<H>  05-24    proBNP:   Lipid Profile:   HgA1c:   TSH:

## 2022-05-25 NOTE — PROGRESS NOTE ADULT - ASSESSMENT
Impression:  This is a 67 year old gentleman pmhx CAD s/p MI/PCI/CABG, hx TIA, afib on rivaroxaban, HTN, DM2, PVD, gout, L CEA 2014, and CKD who presented to [facility] 4/11/22 with ANT, tremors, confusion and lethargy.  CT head no acute changes.  S/p initiation of hemodialysis.  Mental status has improved dramatically.  Pt denies any headaches, no slurred speech, no hemiparesis.  He has chronic gout with bilateral finger swelling, pain.  He also has chronic neuropathy.  Both legs are weak.      Persistent encephalopathy prompted re-consultation on 5/10/22.  MRI brain was ordered and revealed small acute infarctions in bilateral posterior centrum semiovale and left corona radiata and left posterior periventricular white matter.  Carotid ultrasound was performed showing new occlusion of the left internal carotid artery, along with greater than 70% stenosis involving the distal right common carotid artery, and mild to moderate flow-limiting stenosis is seen at the left external carotid artery.  Vascular surgery has been consulted and is recommending CTA.  He continues to be encephalopathic and lethargic.      Diagnosis and Recommendations:  1.  Multifactorial delirium - not improving    2. Complete left ICA occlusion and 70% distal CCA stenosis. Has collateral flow via acomm and pcomm but should be re-evaluated for either CEA or carotid stenting. CEA may be ideal considering issues with medication compliance and potential difficulty adhering to DAPT regimen but defer to proceduralists regarding this. On 5/25, Dr David discussed extensively with son at bedside and daughter via FaceTime: all options here have risk associated with them.  Opening the artery may or may not help.  However, it will reduce long-term stroke risk.  It might help with his mental status by helping brain perfusion, though this cannot be guaranteed.  There is risk associated with procedure.    Further questions regarding the procedure will need to be addressed by neurosurgery.     3. Reviewed MRI - CVAs in bilateral watershed territory which can potentially contribute to delirium, those these CVAs do not appear particularly large.      - MRA was suboptimal not visualizing the neck but MRA head showed left KESHAWN coming from ACOM and left PCA feeding left MCA.  ICA showed no flow.    - in setting of atrial fibrillation infarcts could potentially also be cardioembolic, continue anticoagulation as per cardiology  - s/p carotid doppler, suboptimal CTA/MRA.  However, it is apparent that there is a proximal left ICA occlusion, which would not be a candidate for intervention.  Intervention for right CCA/ICA stenosis would be high risk but it may indeed by a symptomatic stenosis.   - ECHO in February 2022 did not reveal severe cardiomyopathy, vegetation, or LV thrombus.    - HgA1c of 11 also driving stroke risk.  Goal is < 7  - continue high-intensity statin therapy    d/w RN, appears procedure scheduled for friday, will need to coordinate with HD  will follow

## 2022-05-25 NOTE — PROGRESS NOTE ADULT - SUBJECTIVE AND OBJECTIVE BOX
CARDIOLOGY FOLLOW UP - Dr. Mazariegos  DATE OF SERVICE: 5/25/22     CC no cp or sob       REVIEW OF SYSTEMS:  CONSTITUTIONAL: No fever, weight loss, or fatigue  RESPIRATORY: No cough, wheezing, chills or hemoptysis; No Shortness of Breath  CARDIOVASCULAR: No chest pain, palpitations, passing out, dizziness, or leg swelling  GASTROINTESTINAL: No abdominal or epigastric pain. No nausea, vomiting, or hematemesis; No diarrhea or constipation. No melena or hematochezia.      PHYSICAL EXAM:  T(C): 36.8 (05-25-22 @ 04:55), Max: 36.8 (05-24-22 @ 18:30)  HR: 97 (05-25-22 @ 04:55) (74 - 104)  BP: 145/74 (05-25-22 @ 04:55) (116/60 - 158/66)  RR: 18 (05-25-22 @ 04:55) (17 - 22)  SpO2: 91% (05-25-22 @ 04:55) (91% - 95%)  Wt(kg): --  I&O's Summary    24 May 2022 07:01  -  25 May 2022 07:00  --------------------------------------------------------  IN: 0 mL / OUT: 0 mL / NET: 0 mL        Appearance: Normal	  Cardiovascular: Normal S1 S2, irreg   Respiratory: Lungs clear to auscultation	  Gastrointestinal:  Soft, Non-tender, + BS	  Extremities: Normal range of motion, No clubbing, cyanosis or edema      Home Medications:  allopurinol 100 mg oral tablet: 1 tab(s) orally once a day (03 Apr 2022 06:34)  aspirin 81 mg oral delayed release tablet: 1 tab(s) orally once a day (03 Apr 2022 06:34)  bumetanide 2 mg oral tablet: 1 tab(s) orally once a day (03 Apr 2022 06:34)  insulin glargine 100 units/mL subcutaneous solution: 25 unit(s) subcutaneous once a day (at bedtime) (03 Apr 2022 06:34)  metOLazone 5 mg oral tablet: 1 tab(s) orally 3 times a week (03 Apr 2022 06:34)  metoprolol succinate 50 mg oral tablet, extended release: 2 tab(s) orally once a day (03 Apr 2022 06:34)  NovoLOG 100 units/mL subcutaneous solution: subcutaneous 4 times a day (before meals and at bedtime) (03 Apr 2022 06:34)  NovoLOG FlexPen 100 units/mL injectable solution: 10 unit(s) subcutaneous 3 times a day (03 Apr 2022 06:34)  oxycodone-acetaminophen 5 mg-325 mg oral tablet: 1 tab(s) orally 2 times a day (03 Apr 2022 06:34)  Pradaxa 150 mg oral capsule: 1 cap(s) orally 2 times a day (03 Apr 2022 06:34)  pregabalin 100 mg oral capsule: 1 cap(s) orally 3 times a day (03 Apr 2022 06:34)      MEDICATIONS  (STANDING):  allopurinol 100 milliGRAM(s) Oral daily  apixaban 5 milliGRAM(s) Oral two times a day  aspirin  chewable 81 milliGRAM(s) Oral daily  atorvastatin 40 milliGRAM(s) Oral at bedtime  chlorhexidine 4% Liquid 1 Application(s) Topical <User Schedule>  dextrose 50% Injectable 25 Gram(s) IV Push once  dextrose 50% Injectable 25 Gram(s) IV Push once  dextrose 50% Injectable 12.5 Gram(s) IV Push once  diltiazem    milliGRAM(s) Oral daily  insulin lispro (ADMELOG) corrective regimen sliding scale   SubCutaneous three times a day before meals  insulin lispro (ADMELOG) corrective regimen sliding scale   SubCutaneous at bedtime  lidocaine   4% Patch 1 Patch Transdermal daily  metoprolol succinate  milliGRAM(s) Oral daily  multivitamin/minerals 1 Tablet(s) Oral daily  mupirocin 2% Ointment 1 Application(s) Both Nostrils two times a day  polyethylene glycol 3350 17 Gram(s) Oral two times a day  povidone iodine 10% Solution 1 Application(s) Topical daily  senna 2 Tablet(s) Oral at bedtime  sodium chloride 0.9%. 1000 milliLiter(s) (50 mL/Hr) IV Continuous <Continuous>      TELEMETRY: afib hr 110 	    ECG:  	  RADIOLOGY:   DIAGNOSTIC TESTING:  [ ] Echocardiogram:  [ ]  Catheterization:  [ ] Stress Test:    OTHER: 	    LABS:	 	                            7.3    10.53 )-----------( 330      ( 24 May 2022 14:22 )             25.4     05-24    135  |  96  |  41<H>  ----------------------------<  215<H>  4.2   |  25  |  6.84<H>    Ca    8.6      24 May 2022 14:22  Phos  4.2     05-24  Mg     2.3     05-24    TPro  6.4  /  Alb  3.2<L>  /  TBili  0.3  /  DBili  x   /  AST  8<L>  /  ALT  <5<L>  /  AlkPhos  132<H>  05-24

## 2022-05-25 NOTE — PROGRESS NOTE ADULT - ASSESSMENT
Echo 5/13/22:  1. Normal left ventricular internal dimensions and wall  thicknesses.  2. Endocardial visualization enhanced with intravenous  injection of Ultrasonic Enhancing Agent (Lumason). Grossly  borderline normal left ventricular systolic function; no  obvious segmental wall motion abnormality.  3. The right ventricle is not well visualized.  *** Compared with echocardiogram of 2/22/2022, no  significant changes noted.      A/P    68 y/o M with history of CAD, s/p multiple PCI, with most recent 2/22 PCI of LAD in setting of NSTEMI, on ASA only (pradaxa), chronic atrial fibrillation maintained on Pradaxa, essential HTN, type 2 DM previously on oral medications but on insulin, diabetic neuropathy with chronic RIGHT LE venous stasis changes>left, LEFT foot ulcer seen by podiatry, past endarterectomy LEFT CEA in 2014 presenting with 1 week of decreased urine output, cystitis with hematuria     #ANT on CKD, new HD  -oliguric renal failure in setting of UTI/cystitis  -New HD for uremia, renal failure  -sp rrt 4/12 for uncontrolled bleeding sp  right groin shiley removal  - monitor h/h - stable  -s/p L AVG 4/18  -s/p permacath placement 4/20  -renal f/ u    #AMS/Lethargy  -recent ams from pain meds  -AMS sp RRT 4/27  likely secondary to pain med   -repeat CT 4/27 unremarkable --  discontinued Percocet  -MRI 5/11  revealed small acute infarctions in bilateral posterior centrum semiovale and left corona radiata and left posterior periventricular white matter  -on asa statin  -repeat echo with no interval changes  -CTa head neck noted; neuro following     #Acute on chronic HFpEF  -stable  -continue volume removal with HD  -c/w bb  -repeat echo with stable borderline lv fxn    #Chronic AF  -rates mildly elevated  - increase cardizem to 240 mg daily   -c/w metoprolol succ 100 mg qd  -c/w eliquis 5 mg BID for now - heme stable     #HTN  -stable  -c/w meds       #CAD, s/p PCI, most recent 2/2022  -stable, cont asa  -off plavix due to a/c indication  -statin     #carotid stenosis   -previous MRA  noted with Greater than 75% stenosis of the right distal common carotid artery. Approximately 60% stenosis of the proximal left internal carotid artery.  -carotid dopplers 5/11 noted :  There is new occlusion of the left internal carotid artery;  greater than 70% stenosis involving the distal right common carotid artery as was seen previously. Mild to moderate flow-limiting stenosis is seen at the left external   carotid artery.  -CTa head and neck 5/12 noted  -Continue ASA and Statin Therapy  -neuro fu  / work up / imaging per vascular   -mra head/ neck noted- per vascular outpt follow up for repeat to re eval w MRI / mra of the brain neck   -neurosx FU noted _ planning angio, possible stent

## 2022-05-25 NOTE — PROVIDER CONTACT NOTE (OTHER) - ASSESSMENT
Pt p/w new onset of abdominal breathing. VSS and pt remains stable with no acute changes. Fluid was not removed during HD session 5/24. IVF administered for 24h at rate of 50ml/h on 5/23-5/24

## 2022-05-25 NOTE — PROGRESS NOTE ADULT - SUBJECTIVE AND OBJECTIVE BOX
Subjective:  In bed, awake but disoriented,     Medications:  allopurinol 100 milliGRAM(s) Oral daily  apixaban 5 milliGRAM(s) Oral two times a day  aspirin  chewable 81 milliGRAM(s) Oral daily  atorvastatin 40 milliGRAM(s) Oral at bedtime  bisacodyl 5 milliGRAM(s) Oral every 12 hours PRN  chlorhexidine 4% Liquid 1 Application(s) Topical <User Schedule>  dextrose 50% Injectable 25 Gram(s) IV Push once  dextrose 50% Injectable 25 Gram(s) IV Push once  dextrose 50% Injectable 12.5 Gram(s) IV Push once  dextrose Oral Gel 15 Gram(s) Oral once PRN  diltiazem    milliGRAM(s) Oral daily  insulin lispro (ADMELOG) corrective regimen sliding scale   SubCutaneous three times a day before meals  insulin lispro (ADMELOG) corrective regimen sliding scale   SubCutaneous at bedtime  lidocaine   4% Patch 1 Patch Transdermal daily  metoprolol succinate  milliGRAM(s) Oral daily  multivitamin/minerals 1 Tablet(s) Oral daily  mupirocin 2% Ointment 1 Application(s) Both Nostrils two times a day  polyethylene glycol 3350 17 Gram(s) Oral two times a day  povidone iodine 10% Solution 1 Application(s) Topical daily  senna 2 Tablet(s) Oral at bedtime  sodium chloride 0.9% lock flush 10 milliLiter(s) IV Push every 1 hour PRN  sodium chloride 0.9%. 1000 milliLiter(s) IV Continuous <Continuous>      Labs:  CBC Full  -  ( 24 May 2022 14:22 )  WBC Count : 10.53 K/uL  RBC Count : 3.04 M/uL  Hemoglobin : 7.3 g/dL  Hematocrit : 25.4 %  Platelet Count - Automated : 330 K/uL  Mean Cell Volume : 83.6 fl  Mean Cell Hemoglobin : 24.0 pg  Mean Cell Hemoglobin Concentration : 28.7 gm/dL  Auto Neutrophil # : 7.22 K/uL  Auto Lymphocyte # : 1.89 K/uL  Auto Monocyte # : 0.81 K/uL  Auto Eosinophil # : 0.41 K/uL  Auto Basophil # : 0.06 K/uL  Auto Neutrophil % : 68.6 %  Auto Lymphocyte % : 17.9 %  Auto Monocyte % : 7.7 %  Auto Eosinophil % : 3.9 %  Auto Basophil % : 0.6 %    05-24    135  |  96  |  41<H>  ----------------------------<  215<H>  4.2   |  25  |  6.84<H>    Ca    8.6      24 May 2022 14:22  Phos  4.2     05-24  Mg     2.3     05-24    TPro  6.4  /  Alb  3.2<L>  /  TBili  0.3  /  DBili  x   /  AST  8<L>  /  ALT  <5<L>  /  AlkPhos  132<H>  05-24    CAPILLARY BLOOD GLUCOSE      POCT Blood Glucose.: 182 mg/dL (25 May 2022 07:51)  POCT Blood Glucose.: 191 mg/dL (24 May 2022 21:21)  POCT Blood Glucose.: 139 mg/dL (24 May 2022 18:39)  POCT Blood Glucose.: 222 mg/dL (24 May 2022 11:23)    LIVER FUNCTIONS - ( 24 May 2022 14:22 )  Alb: 3.2 g/dL / Pro: 6.4 g/dL / ALK PHOS: 132 U/L / ALT: <5 U/L / AST: 8 U/L / GGT: x                   Vitals:  Vital Signs Last 24 Hrs  T(C): 36.8 (25 May 2022 04:55), Max: 36.8 (24 May 2022 18:30)  T(F): 98.2 (25 May 2022 04:55), Max: 98.3 (24 May 2022 18:30)  HR: 97 (25 May 2022 04:55) (74 - 104)  BP: 145/74 (25 May 2022 04:55) (116/60 - 158/66)  BP(mean): --  RR: 18 (25 May 2022 04:55) (17 - 22)  SpO2: 91% (25 May 2022 04:55) (91% - 95%)    NEUROLOGICAL EXAM:    Mental status: awake, tired, no active distress.  He is oriented to person, did not know place, just says I am here but not month or day.  He intermittently follows very simple commands and names very simple objects.        Cranial Nerves: Pupils were equal, round, reactive to light. Extraocular movements were intact. B BTT Facial sensation was intact to light touch. There was no facial asymmetry. The palate was upgoing symmetrically and tongue was midline. Hearing acuity was intact to finger rub AU. Shoulder shrug was full bilaterally    Motor exam: Bulk and tone were normal. moves all ext antigravity but drifts to bed.  There is relative right leg weakness in setting of pain which has been previously present.  Fine finger movements were limited by mental status.  There was no pronator drift    Reflexes: DTRs depressed, flexor plantars.     Sensation: Intact to noxious, seems intact to light touch, due to decreased attention did not assess other modalities     Coordination: no gross dysmetria    Gait: deferred    NIHSS: 12    Imaging:    ACC: [id number] EXAM:  CT BRAIN                        PROCEDURE DATE:  04/09/2022      INTERPRETATION:  CLINICAL INFORMATION:  Altered mental status, on   anticoagulation .    TECHNIQUE: Multiple contiguous axial images were acquired from the   skullbase to the vertex without the administration of intravenous   contrast.  Coronal and sagittal reformations were made.    COMPARISON: CT head 10/21/2021, brain MRI 02/23/2014.    FINDINGS:    Scattered lacunar infarcts in the bilateral cerebralhemispheres are   grossly stable compared to the 10/21/2021 head CT. No new additional   infarcts are noted over the time interval.    No acute intracranial hemorrhage, mass effect, or midline shift. The   brain demonstrates periventricular hypoattenuation, nonspecific in   etiology but likely representing chronic microvascular ischemic changes.    No abnormal extra-axial fluid collections are present.    The ventricles and sulci demonstrate age appropriate involutional   changes.  The basal cisterns are patent.    The orbits are unremarkable. The paranasal sinuses are clear. The mastoid   air cells are clear. The calvarium is intact.    IMPRESSION:    No acute intracranial abnormality is noted. If the patient has new and   persistent symptoms, consider short interval follow-up head CT or brain   MRI.    --- End of Report ---    GATITO VILLARREAL MD; Resident Radiologist  This document has been electronically signed.  JESSE CORONA MD; Attending Radiologist  This document has been electronically signed. Apr 9 2022  2:00PM        ACC: [id number] EXAM:  MR BRAIN                          PROCEDURE DATE:  05/11/2022          INTERPRETATION:  MR brain  without gadolinium    CLINICAL INFORMATION: Stroke.    TECHNIQUE: Limited Sagittal T1-weighted images, axial FLAIR images, and   axial diffusion weighted images of the brain were obtained.  Patient was   unable to hold still for remaining sequences.    FINDINGS:   MRI dated 02/23/2014 available for review.    The brain demonstrates small acute infarctions in the BILATERAL posterior   centrum semiovale and LEFT corona radiata and LEFT posterior   periventricular white matter with restricted diffusion. No intracranial   hemorrhage is recognized. No mass effect is found in the brain.    The ventricles, sulci and basal cisterns show mild global atrophy most   prominent within the BILATERAL cerebellum.    The vertebral and internal carotid arteries demonstrate expected flow   voids indicating their patency.    The orbits are unremarkable.  The paranasal sinuses are clear.  The nasal   cavity appears intact.  The nasopharynx is symmetric.  The central skull   base and temporal bones are intact.  The calvarium appears unremarkable.    IMPRESSION: Small acute infarctions in the BILATERAL posterior centrum   semiovale and LEFT corona radiata and LEFT posterior periventricular   white matter with restricted diffusion.   mild global atrophy most   prominent within the BILATERAL cerebellum.    --- End of Report ---            JESÚS PADGETT MD; Attending Radiologist  This document has been electronically signed. May 11 2022  5:47PM        Patient name: DYLAN DEL CASTILLO  YOB: 1955   Age: 67 (M)   MR#: [id number]  Study Date: 2/22/2022  [facility] Iron Aguirre RDCS  Study quality: Technically difficult  Referring Physician: Mendoza Corral MD  Blood Pressure: 152/82 mmHg  Height: 180 cm  Weight: 136 kg  BSA: 2.5 m2  ------------------------------------------------------------------------  PROCEDURE: Transthoracic echocardiogram with 2-D, M-Mode  and complete spectral and color flow Doppler. Verbal  consent was obtained for injection of  Ultrasonic Enhancing  Agent following a discussion of risks and benefits.  Following intravenous injection of Ultrasonic Enhancing  Agent , harmonic imaging was performed.  INDICATION: Chest pain, unspecified (R07.9)  ------------------------------------------------------------------------  Dimensions:    Normal Values:  LA:     4.4    2.0 - 4.0 cm  Ao:     4.0    2.0 - 3.8 cm  SEPTUM: 1.1    0.6 - 1.2 cm  PWT:    0.9    0.6 - 1.1 cm  LVIDd:  5.0    3.0 - 5.6 cm  LVIDs:  3.5    1.8 - 4.0 cm  Derived variables:  LVMI: 73 g/m2  RWT: 0.36  EF (Visual Estimate): 60 %  Doppler Peak Velocity (m/sec): AoV=1.5 TV=2.2  ------------------------------------------------------------------------  Observations:  Mitral Valve: Normal mitral valve.  Aortic Valve/Aorta: Calcified aortic valve with normal  opening. Minimal aortic regurgitation.  Normal aortic root size.  Left Atrium: Mildly dilated left atrium.  Left Ventricle: Endocardial visualization enhanced with  intravenous injection of Ultrasonic Enhancing Agent  (Definity).  Normal left ventricular internal dimensions and wall  thickness.  Normal left ventricular systolic function. No segmental  wall motion abnormalities.  Right Heart: Normal right atrium. Normal right ventricular  size and function.  Normal tricuspid valve. Mild tricuspid regurgitation.  Normal pulmonic valve. Mild pulmonic regurgitation.  Pericardium/Pleura: Normal pericardium with no pericardial  effusion.  Hemodynamic: No evidence of pulmonary hypertension.  ------------------------------------------------------------------------  Conclusions:  Endocardial visualization enhanced with intravenous  injection of Ultrasonic Enhancing Agent (Definity).  Normal left ventricular systolic function. No segmental  wall motion abnormalities.  ------------------------------------------------------------------------  Confirmed on  2/22/2022 - 16:17:39 by Ishan Ackerman MD, FASE  ------------------------------------------------------------------------    < from: CT Angio Head w/ IV Cont (05.12.22 @ 13:34) >    ACC: [id number] EXAM:  CT ANGIO NECK (W)AW IC                        ACC: [id number] EXAM:  CT ANGIO BRAIN (W)AW IC                          PROCEDURE DATE:  05/12/2022          INTERPRETATION:  CT angiography of the brain and neck    CLINICAL INDICATION: Recent infarcts. Carotid stenosis.    TECHNIQUE: Direct axial CT scanning of the brain and neck was obtained   from the vertex to the level of the clavicular heads after the dynamic   intravenous injection of 44 cc of Omnipaque 300. 0 cc were discarded.   Sagittal and coronal maximum intensity projection reformats were   provided.  Three-dimensional reconstructions were performed by the   radiologist using the [employer] workstation.    Additionally, noncontrast axial CT scanning of the brain was obtained   from the skull base to the vertex. Sagittal and coronal reformats were   provided.    COMPARISON: MRA neck 10/24/2021    FINDINGS:    CT brain:    Previously seen acute infarcts in the bilateral cerebral hemispheres are   redemonstrated. No CT evidence for hemorrhagic transformation.    No hydrocephalus, midline shift, or effacement of basal cisterns.    No acute intracranial hemorrhage or vasogenic edema.    Mild to moderate white matter microvascular ischemic disease.    CTA brain:    Limited by relatively decreased opacification of the arteries.    Multifocal narrowing of the right skull base ICA due to calcified plaque,   the degree of which is not well evaluated.    Normal flow-related enhancement of the supraclinoid right ICA.    Lack of flow related enhancement of the petrous, precavernous, and   cavernous left ICA. Reconstitution of the vessel at its supraclinoid   segment.    Otherwise no flow-limiting stenosis.    Normal flow-related enhancement of the bilateral anterior, middle, and   posterior cerebral arteries.    Normal flow-related enhancement of the intradural vertebral arteries and   basilar artery.    No vascular aneurysm or AVM.    CTA neck:    Stenoses described in this report are on the basis of NASCET criteria.    Study is significantly limited by relatively decreased opacification of   the arteries.    Short segment stenosis of the mid left common carotid artery due to the   presence of partially calcified plaque is poorly evaluated. Flow-related   enhancement of the more proximal and distal left common carotid artery is   noted.    Long segment stenosis of the mid and distal right common carotid artery   due to the presence of partially calcified plaque is poorly evaluated.    Rapidly progressive stenosis of the left internal carotid artery   beginning at its origin. No flow related enhancement of the vessel beyond   its origin.    Normal flow-related enhancement of the bilateral vertebral arteries.    No gross evidence for acute arterial dissection.    IMPRESSION:    CTA brain:  Limited by relatively decreased opacification of the arteries.    Multifocal narrowing of the right skull base ICA due to calcified plaque,   the degree of which is not well evaluated.    Lack of flow related enhancement of the petrous, precavernous, and   cavernous left ICA. Reconstitution of the vessel at its supraclinoid   segment.    Otherwise no flow-limiting stenosis.    No vascular aneurysm or AVM.    CTA neck:  Limited by relatively decreased opacification of the arteries.    Short segment stenosis of the mid left common carotid artery due to the   presence of partially calcified plaque is poorly evaluated. Flow-related   enhancement of the more proximal and distal left common carotid artery is   noted.    Long segment stenosis of the mid and distal right common carotid artery   due to the presence of partially calcified plaque is poorly evaluated.    Rapidly progressive stenosis of the left internal carotid artery   beginning at its origin. No flow related enhancement of the vessel beyond   its origin.    Recommend correlation with contrast-enhanced MRI of the neck for further   evaluation.    --- End of Report ---            MIGUEL ANGEL CARRILLO MD; Attending Radiologist  This document has been electronically signed. May 12 2022  2:47PM    < end of copied text >

## 2022-05-25 NOTE — PROGRESS NOTE ADULT - PROBLEM SELECTOR PLAN 3
MR head with multiple small acute infarctions  -Repeat TTE with no changes   -+carotid stenosis   -Neuro sx recs noted, plans for possible angio and stent placement   -Neuro f/u

## 2022-05-25 NOTE — PROGRESS NOTE ADULT - SUBJECTIVE AND OBJECTIVE BOX
Follow-up Pulm Progress Note    No new respiratory events overnight.  Denies SOB/CP.     Medications:  MEDICATIONS  (STANDING):  allopurinol 100 milliGRAM(s) Oral daily  apixaban 5 milliGRAM(s) Oral two times a day  aspirin  chewable 81 milliGRAM(s) Oral daily  atorvastatin 40 milliGRAM(s) Oral at bedtime  chlorhexidine 4% Liquid 1 Application(s) Topical <User Schedule>  dextrose 50% Injectable 25 Gram(s) IV Push once  dextrose 50% Injectable 25 Gram(s) IV Push once  dextrose 50% Injectable 12.5 Gram(s) IV Push once  diltiazem    milliGRAM(s) Oral daily  insulin lispro (ADMELOG) corrective regimen sliding scale   SubCutaneous three times a day before meals  insulin lispro (ADMELOG) corrective regimen sliding scale   SubCutaneous at bedtime  lidocaine   4% Patch 1 Patch Transdermal daily  metoprolol succinate  milliGRAM(s) Oral daily  multivitamin/minerals 1 Tablet(s) Oral daily  mupirocin 2% Ointment 1 Application(s) Both Nostrils two times a day  polyethylene glycol 3350 17 Gram(s) Oral two times a day  povidone iodine 10% Solution 1 Application(s) Topical daily  senna 2 Tablet(s) Oral at bedtime  sodium chloride 0.9%. 1000 milliLiter(s) (50 mL/Hr) IV Continuous <Continuous>    MEDICATIONS  (PRN):  bisacodyl 5 milliGRAM(s) Oral every 12 hours PRN Constipation  dextrose Oral Gel 15 Gram(s) Oral once PRN Blood Glucose LESS THAN 70 milliGRAM(s)/deciliter  sodium chloride 0.9% lock flush 10 milliLiter(s) IV Push every 1 hour PRN Pre/post blood products, medications, blood draw, and to maintain line patency          Vital Signs Last 24 Hrs  T(C): 36.5 (25 May 2022 11:22), Max: 36.8 (24 May 2022 18:30)  T(F): 97.7 (25 May 2022 11:22), Max: 98.3 (24 May 2022 18:30)  HR: 79 (25 May 2022 11:22) (79 - 97)  BP: 155/73 (25 May 2022 11:22) (120/52 - 158/66)  BP(mean): --  RR: 18 (25 May 2022 11:22) (18 - 22)  SpO2: 93% (25 May 2022 11:22) (91% - 94%)          05-24 @ 07:01  -  05-25 @ 07:00  --------------------------------------------------------  IN: 0 mL / OUT: 0 mL / NET: 0 mL          LABS:                        8.2    9.24  )-----------( 328      ( 25 May 2022 11:51 )             29.0     05-25    136  |  97  |  22  ----------------------------<  177<H>  3.9   |  24  |  4.33<H>    Ca    8.8      25 May 2022 11:51  Phos  4.2     05-24  Mg     2.3     05-24    TPro  6.4  /  Alb  3.2<L>  /  TBili  0.3  /  DBili  x   /  AST  8<L>  /  ALT  <5<L>  /  AlkPhos  132<H>  05-24          CAPILLARY BLOOD GLUCOSE      POCT Blood Glucose.: 187 mg/dL (25 May 2022 12:03)    PT/INR - ( 25 May 2022 11:51 )   PT: 23.5 sec;   INR: 2.01 ratio         PTT - ( 25 May 2022 11:51 )  PTT:34.5 sec              Physical Examination:  PULM: Clear to auscultation bilaterally, no significant sputum production  CVS: S1, S2 heard    RADIOLOGY REVIEWED  CXR 5/12: small L effusion    CT chest: < from: CT Chest No Cont (04.04.22 @ 16:52) >  FINDINGS:    AIRWAYS, LUNGS, PLEURA: Tracheal secretions. Small left greater than   right pleural effusions increased compared to 2/19/2022. Right-sided   pleural thickening. Lingular and left greater than right basilar   opacification, likely atelectasis.    MEDIASTINUM: Cardiomegaly. No pericardial effusion. Thoracic aorta normal   caliber.  No large mediastinal lymph nodes.    IMAGED ABDOMEN: Nonspecific perinephric stranding.    SOFT TISSUES: Unremarkable.    BONES: Unremarkable.    IMPRESSION:.    Small left greater than right bilateral pleural effusions increased in   size compared to 2/19/2022.    Lingular and left greater than right basilar opacification, likely   atelectasis.    --- End of Report ---    < end of copied text >

## 2022-05-25 NOTE — PROGRESS NOTE ADULT - ASSESSMENT
·  Problem:[email or web address] hemodialysis per renal  to cont as per renal  f/u labs      Problem/Plan - 2:  ·  Problem: Chronic atrial fibrillation. c/w a/c  cards f/u     Problem/Plan - 3:  ·  Problem: Cystitis.   ID follow up appreciated        Problem/Plan - 4:  ·  Problem: Type 2 diabetes mellitus. c/w insulin   endo f/u      Problem/Plan - 5:  ·  Problem: CAD (coronary artery disease).   rapid a fib  meds adjusted  hr stable  c/w a/c       Persistent encephalopathy   neuro follow up appreciated  f/u labs   mental status better  polypharmacy contributing and volume status   minimizing meds     carotid duplex noted  cta noted  mra/ mri noted  tx plans as per vasc / neuro   neuro f/u noted  neuro sx to do carotid stent   stent likely friday

## 2022-05-26 NOTE — PROGRESS NOTE ADULT - ASSESSMENT
Impression:  This is a 67 year old gentleman pmhx CAD s/p MI/PCI/CABG, hx TIA, afib on rivaroxaban, HTN, DM2, PVD, gout, L CEA 2014, and CKD who presented to [facility] 4/11/22 with ANT, tremors, confusion and lethargy.  CT head no acute changes.  S/p initiation of hemodialysis.  Mental status has improved dramatically.  Pt denies any headaches, no slurred speech, no hemiparesis.  He has chronic gout with bilateral finger swelling, pain.  He also has chronic neuropathy.  Both legs are weak.      Persistent encephalopathy prompted re-consultation on 5/10/22.  MRI brain was ordered and revealed small acute infarctions in bilateral posterior centrum semiovale and left corona radiata and left posterior periventricular white matter.  Carotid ultrasound was performed showing new occlusion of the left internal carotid artery, along with greater than 70% stenosis involving the distal right common carotid artery, and mild to moderate flow-limiting stenosis is seen at the left external carotid artery.  Vascular surgery has been consulted and is recommending CTA.  He continues to be encephalopathic and lethargic.      Diagnosis and Recommendations:  1.  Multifactorial delirium - not improving    2. Complete left ICA occlusion and 70% distal CCA stenosis. Has collateral flow via acomm and pcomm but should be re-evaluated for either CEA or carotid stenting. CEA may be ideal considering issues with medication compliance and potential difficulty adhering to DAPT regimen but defer to proceduralists regarding this. On 5/25, Dr David discussed extensively with son at bedside and daughter via FaceTime: all options here have risk associated with them.  Opening the artery may or may not help.  However, it will reduce long-term stroke risk.  It might help with his mental status by helping brain perfusion, though this cannot be guaranteed.  There is risk associated with procedure.    Further questions regarding the procedure will need to be addressed by neurosurgery.     3. Reviewed MRI - CVAs in bilateral watershed territory which can potentially contribute to delirium, those these CVAs do not appear particularly large.      - MRA was suboptimal not visualizing the neck but MRA head showed left KESHAWN coming from ACOM and left PCA feeding left MCA.  ICA showed no flow.    - in setting of atrial fibrillation infarcts could potentially also be cardioembolic, continue anticoagulation as per cardiology  - s/p carotid doppler, suboptimal CTA/MRA.  However, it is apparent that there is a proximal left ICA occlusion, which would not be a candidate for intervention.  Intervention for right CCA/ICA stenosis would be high risk but it may indeed by a symptomatic stenosis.   - ECHO in February 2022 did not reveal severe cardiomyopathy, vegetation, or LV thrombus.    - HgA1c of 11 also driving stroke risk.  Goal is < 7  - continue high-intensity statin therapy     appears procedure scheduled for friday  reviewed case with dr garcia, nephrology  will follow

## 2022-05-26 NOTE — CHART NOTE - NSCHARTNOTEFT_GEN_A_CORE
Nutrition Follow Up Note  Patient seen for: calorie count/malnutrition follow-up. Chart reviewed, events noted.     "67yoM PMHx of CAD, multiple past PCI with most recent PCI in 2022 following a non ST elevation MI, type 2 DM on insulin with complicating peripheral neuropathy, past CVA and LEFT CEA with no residual deficits, chronic RIGHT>left induration with poorly healing ulcers, chronic atrial fibrillation on Pradaxa. Patient self referring to the ER following  treatment by an urgent care center for an apparent cystitis with hematuria on nitrofurantoin but with patient noting decreased urine output for the past week"    Source: [] Patient       [x] Medical Record        [x] RN        [] Family at bedside       [] Other:    -If unable to interview patient: [] Trach/Vent/BiPAP  [x] Disoriented/confused/inappropriate to interview    Diet Order:   Diet, NPO:   NPO for Procedure/Test     NPO Start Date: 26-May-2022,   NPO Start Time: 23:59 (22)  Diet, Consistent Carbohydrate Renal/No Snacks:   Supplement Feeding Modality:  Oral  Nepro Cans or Servings Per Day:  1       Frequency:  Daily (22)    - Is current order appropriate/adequate? [x] Yes  []  No:     - PO intake :   [] >75%  Adequate    [] 50-75%  Fair       [x] <50%  Poor    - Nutrition-related concerns:      - Pt with persistent poor PO intake. Per RN, pt is refusing everything, including the Nepro shake.      - Per chart: pt's daughter is refusing NGT      - Pt s/p calorie count (-), see results below       - Per chart: pt is ordered for admelog SSI and multivitamin with minerals    Calorie Count results:  - Day 1 ( dinner only): Pt consumed ~189 kcal and ~5.25 g pro.  Meets ~8% lower estimated energy needs, and ~5.5% lower estimated protein needs.  - Day 2 (): Pt consumed ~106 kcal and ~1.54 g pro. Meets ~4.5% lower estimated energy needs, and ~1.6% lower estimated protein needs.  - Day 3 (): Pt consumed ~180 kcal and ~5 g pro. Meets ~7.7% lower estimated energy needs, and ~5.3% lower estimated protein needs.  - Overall, pt is not meeting estimated protein energy needs based on this 3 day calorie count.     GI: RN denies pt with any recent nausea, vomiting, constipation, diarrhea.  Last BM  per chart.   Bowel Regimen? [x] Yes (dulcolax, miralax, senna)  [] No      Weights:   Daily Weight in k.4 (-26), 111.8 (-24), 111.8 (-24), 112.5 (-), 112.5 (-21), 110.5 (-14),  115.3 (-12), 116.3 (-12), 117 (-10), 117.5 (-10), 115.1 (-07), 114.2 (-03), 116.2 (-03), 126 (-16), 138.8 (-08)  Dosing wt: 120.9 kg (-20)  Weight loss noted since admission - pt with persistent poor PO intake and started on HD (first HD 4/3). RD to continue to monitor weight trends as able/available.       MEDICATIONS  (STANDING):  allopurinol 100 milliGRAM(s) Oral daily  apixaban 5 milliGRAM(s) Oral two times a day  aspirin  chewable 81 milliGRAM(s) Oral daily  atorvastatin 40 milliGRAM(s) Oral at bedtime  chlorhexidine 4% Liquid 1 Application(s) Topical <User Schedule>  dextrose 50% Injectable 25 Gram(s) IV Push once  dextrose 50% Injectable 25 Gram(s) IV Push once  dextrose 50% Injectable 12.5 Gram(s) IV Push once  diltiazem    milliGRAM(s) Oral daily  insulin lispro (ADMELOG) corrective regimen sliding scale   SubCutaneous three times a day before meals  insulin lispro (ADMELOG) corrective regimen sliding scale   SubCutaneous at bedtime  lidocaine   4% Patch 1 Patch Transdermal daily  metoprolol succinate  milliGRAM(s) Oral daily  multivitamin/minerals 1 Tablet(s) Oral daily  mupirocin 2% Ointment 1 Application(s) Both Nostrils two times a day  polyethylene glycol 3350 17 Gram(s) Oral two times a day  povidone iodine 10% Solution 1 Application(s) Topical daily  senna 2 Tablet(s) Oral at bedtime  sodium chloride 0.9%. 1000 milliLiter(s) (50 mL/Hr) IV Continuous <Continuous>    MEDICATIONS  (PRN):  bisacodyl 5 milliGRAM(s) Oral every 12 hours PRN Constipation  dextrose Oral Gel 15 Gram(s) Oral once PRN Blood Glucose LESS THAN 70 milliGRAM(s)/deciliter  sodium chloride 0.9% lock flush 10 milliLiter(s) IV Push every 1 hour PRN Pre/post blood products, medications, blood draw, and to maintain line patency      Pertinent Labs:    @ 07:15: Na 137, BUN 26<H>, Cr 5.44<H>, <H>, K+ 3.9   @ 11:51: Na 136, BUN 22, Cr 4.33<H>, <H>, K+ 3.9    A1C with Estimated Average Glucose Result: 10.1 % (22 @ 12:18)    Finger Sticks:  POCT Blood Glucose.: 167 mg/dL ( @ 08:32)  POCT Blood Glucose.: 170 mg/dL ( @ 21:23)  POCT Blood Glucose.: 176 mg/dL ( @ 16:52)  POCT Blood Glucose.: 187 mg/dL ( @ 12:03)      Skin per nursing documentation: No pressure injuries noted. L foot wound noted; L forearm AVM; R IJ shiley 4/12  Edema per nursing documentation: +1 dependent    Estimated Needs:   [x] no change since previous assessment  30-35 kcal/kg = 9801-4022 kcal/day  1.2-1.4 g/kg =  g pro/day  fluids deferred to team  based on IBW: 78.2 kg    Previous Nutrition Diagnosis: Inadequate oral intake, Increased nutrient needs; Severe acute malnutrition   Nutrition Diagnosis is: [x] ongoing  [] resolved [] not applicable   -Being addressed with PO diet, oral nutrition supplements, micronutrient supplementation, and food preferences.     Nutrition Care Plan:  [x] In Progress  [] Achieved  [] Not applicable    New Nutrition Diagnosis: [x] Not applicable    Nutrition Interventions:     Education Provided   [] Yes:  [x] No: pt disoriented, not appropriate    Recommendations:      1) Would recommend alternative means of nutrition (as within GOC) considering pt is not meeting estimated needs. If EN not within GOC, would recommend continue Consistent Carbohydrate, Renal diet and continue Nepro 1x/day.  2) Consider trial of LPS (Liquid protein supplement) 2x/day.  3) Consider d/c multivitamin and exchange for Nephro-Cheyanne supplement - pt on HD  4) Monitor PO intake, GI tolerance, skin integrity, labs, weight, and bowel movement regularity.   5) Honor food preferences as feasible. Assist with meals PRN and encourage PO intake.  6) malnutrition alert in chart     Monitoring and Evaluation:   Continue to monitor nutritional intake, tolerance to diet prescription, weights, labs, skin integrity    RD remains available upon request and will follow up per protocol  Radha Huerta, AVRILN, CDN Pager #[phone]

## 2022-05-26 NOTE — PROGRESS NOTE ADULT - SUBJECTIVE AND OBJECTIVE BOX
Follow-up Pulm Progress Note    pts brother at bedside  pt eating lunch  mildly labored  Sats 95% RA    Medications:  MEDICATIONS  (STANDING):  allopurinol 100 milliGRAM(s) Oral daily  apixaban 5 milliGRAM(s) Oral two times a day  aspirin  chewable 81 milliGRAM(s) Oral daily  atorvastatin 40 milliGRAM(s) Oral at bedtime  chlorhexidine 4% Liquid 1 Application(s) Topical <User Schedule>  dextrose 50% Injectable 25 Gram(s) IV Push once  dextrose 50% Injectable 25 Gram(s) IV Push once  dextrose 50% Injectable 12.5 Gram(s) IV Push once  diltiazem    milliGRAM(s) Oral daily  insulin lispro (ADMELOG) corrective regimen sliding scale   SubCutaneous three times a day before meals  insulin lispro (ADMELOG) corrective regimen sliding scale   SubCutaneous at bedtime  lidocaine   4% Patch 1 Patch Transdermal daily  metoprolol succinate  milliGRAM(s) Oral daily  multivitamin/minerals 1 Tablet(s) Oral daily  mupirocin 2% Ointment 1 Application(s) Both Nostrils two times a day  polyethylene glycol 3350 17 Gram(s) Oral two times a day  povidone iodine 10% Solution 1 Application(s) Topical daily  senna 2 Tablet(s) Oral at bedtime  sodium chloride 0.9%. 1000 milliLiter(s) (50 mL/Hr) IV Continuous <Continuous>    MEDICATIONS  (PRN):  bisacodyl 5 milliGRAM(s) Oral every 12 hours PRN Constipation  dextrose Oral Gel 15 Gram(s) Oral once PRN Blood Glucose LESS THAN 70 milliGRAM(s)/deciliter  sodium chloride 0.9% lock flush 10 milliLiter(s) IV Push every 1 hour PRN Pre/post blood products, medications, blood draw, and to maintain line patency          Vital Signs Last 24 Hrs  T(C): 36.7 (26 May 2022 12:53), Max: 36.7 (26 May 2022 12:53)  T(F): 98 (26 May 2022 12:53), Max: 98 (26 May 2022 12:53)  HR: 98 (26 May 2022 12:53) (83 - 98)  BP: 125/62 (26 May 2022 12:53) (125/62 - 153/78)  BP(mean): --  RR: 17 (26 May 2022 12:53) (17 - 18)  SpO2: 95% (26 May 2022 12:53) (91% - 96%)          05-25 @ 07:01  -  05-26 @ 07:00  --------------------------------------------------------  IN: 200 mL / OUT: 0 mL / NET: 200 mL          LABS:                        8.2    10.43 )-----------( 346      ( 26 May 2022 07:30 )             28.9     05-26    137  |  96  |  26<H>  ----------------------------<  157<H>  3.9   |  24  |  5.44<H>    Ca    8.8      26 May 2022 07:15  Phos  4.2     05-24  Mg     2.3     05-24    TPro  6.4  /  Alb  3.2<L>  /  TBili  0.3  /  DBili  x   /  AST  8<L>  /  ALT  <5<L>  /  AlkPhos  132<H>  05-24          CAPILLARY BLOOD GLUCOSE      POCT Blood Glucose.: 116 mg/dL (26 May 2022 12:54)    PT/INR - ( 26 May 2022 07:30 )   PT: 21.0 sec;   INR: 1.80 ratio         PTT - ( 26 May 2022 07:30 )  PTT:31.7 sec                  Physical Examination:  PULM: Clear to auscultation bilaterally, no significant sputum production  CVS: S1, S2 heard    RADIOLOGY REVIEWED  CXR 5/12: small L effusion    CT chest: < from: CT Chest No Cont (04.04.22 @ 16:52) >  FINDINGS:    AIRWAYS, LUNGS, PLEURA: Tracheal secretions. Small left greater than   right pleural effusions increased compared to 2/19/2022. Right-sided   pleural thickening. Lingular and left greater than right basilar   opacification, likely atelectasis.    MEDIASTINUM: Cardiomegaly. No pericardial effusion. Thoracic aorta normal   caliber.  No large mediastinal lymph nodes.    IMAGED ABDOMEN: Nonspecific perinephric stranding.    SOFT TISSUES: Unremarkable.    BONES: Unremarkable.    IMPRESSION:.    Small left greater than right bilateral pleural effusions increased in   size compared to 2/19/2022.    Lingular and left greater than right basilar opacification, likely   atelectasis.    --- End of Report ---    < end of copied text >

## 2022-05-26 NOTE — PROGRESS NOTE ADULT - SUBJECTIVE AND OBJECTIVE BOX
[facility]   [phone]  RENAL FOLLOW UP NOTE  --------------------------------------------------------------------------------  Chief Complaint:    24 hour events/subjective:    seen on hd     PAST HISTORY  --------------------------------------------------------------------------------  No significant changes to PMH, PSH, FHx, SHx, unless otherwise noted    ALLERGIES & MEDICATIONS  --------------------------------------------------------------------------------  Allergies    nitrofurantoin (Nephrotoxicity)    Intolerances      Standing Inpatient Medications  allopurinol 100 milliGRAM(s) Oral daily  apixaban 5 milliGRAM(s) Oral two times a day  aspirin  chewable 81 milliGRAM(s) Oral daily  atorvastatin 40 milliGRAM(s) Oral at bedtime  chlorhexidine 4% Liquid 1 Application(s) Topical <User Schedule>  dextrose 50% Injectable 25 Gram(s) IV Push once  dextrose 50% Injectable 25 Gram(s) IV Push once  dextrose 50% Injectable 12.5 Gram(s) IV Push once  diltiazem    milliGRAM(s) Oral daily  insulin lispro (ADMELOG) corrective regimen sliding scale   SubCutaneous three times a day before meals  insulin lispro (ADMELOG) corrective regimen sliding scale   SubCutaneous at bedtime  lidocaine   4% Patch 1 Patch Transdermal daily  metoprolol succinate  milliGRAM(s) Oral daily  multivitamin/minerals 1 Tablet(s) Oral daily  mupirocin 2% Ointment 1 Application(s) Both Nostrils two times a day  polyethylene glycol 3350 17 Gram(s) Oral two times a day  povidone iodine 10% Solution 1 Application(s) Topical daily  senna 2 Tablet(s) Oral at bedtime    PRN Inpatient Medications  bisacodyl 5 milliGRAM(s) Oral every 12 hours PRN  dextrose Oral Gel 15 Gram(s) Oral once PRN  sodium chloride 0.9% lock flush 10 milliLiter(s) IV Push every 1 hour PRN      REVIEW OF SYSTEMS  --------------------------------------------------------------------------------    Gen: denies chills,  CVS: denies chest pain/palpitations  Resp: denies SOB/Cough  GI: Denies N/V/Abd pain  : Denies dysuria        VITALS/PHYSICAL EXAM  --------------------------------------------------------------------------------  T(C): 36.7 (05-26-22 @ 21:04), Max: 36.7 (05-26-22 @ 12:53)  HR: 83 (05-26-22 @ 21:04) (82 - 98)  BP: 151/85 (05-26-22 @ 21:04) (125/62 - 153/78)  RR: 17 (05-26-22 @ 21:04) (17 - 18)  SpO2: 97% (05-26-22 @ 21:04) (94% - 97%)  Wt(kg): --        05-25-22 @ 07:01  -  05-26-22 @ 07:00  --------------------------------------------------------  IN: 200 mL / OUT: 0 mL / NET: 200 mL    05-26-22 @ 07:01  -  05-26-22 @ 21:08  --------------------------------------------------------  IN: 900 mL / OUT: 1900 mL / NET: -1000 mL      Physical Exam:  	       Gen:  confused, awake   	Pulm: Decreased breath sounds b/l bases.  	CV: No JVD. IRR  	Abd: +BS, soft, nontender, softly distended, obese               Extremity no edema              Extremity LE: + hyperpigmented bilateral LE with no edema              Vascular: R IJ HD catheter, L arm AVF       LABS/STUDIES  --------------------------------------------------------------------------------              8.2    10.43 >-----------<  346      [05-26-22 @ 07:30]              28.9     137  |  96  |  26  ----------------------------<  157      [05-26-22 @ 07:15]  3.9   |  24  |  5.44        Ca     8.8     [05-26-22 @ 07:15]      PT/INR: PT 21.0 , INR 1.80       [05-26-22 @ 07:30]  PTT: 31.7       [05-26-22 @ 07:30]      Creatinine Trend:  SCr 5.44 [05-26 @ 07:15]  SCr 4.33 [05-25 @ 11:51]  SCr 6.84 [05-24 @ 14:22]  SCr 5.94 [05-21 @ 07:23]  SCr 6.79 [05-19 @ 13:49]                  Iron 21, TIBC 245, %sat 9      [05-17-22 @ 05:54]  Ferritin 120      [05-17-22 @ 05:54]  PTH -- (Ca 8.5)      [05-24-22 @ 14:22]   84  PTH -- (Ca 8.3)      [04-15-22 @ 12:18]   150  PTH -- (Ca --)      [04-03-22 @ 23:06]   97  HbA1c 11.7      [11-07-19 @ 09:14]  TSH 1.71      [05-12-22 @ 07:11]

## 2022-05-26 NOTE — PROGRESS NOTE ADULT - SUBJECTIVE AND OBJECTIVE BOX
DATE OF SERVICE: 05-26-22 @ 10:52  CHIEF COMPLAINT:Patient is a 67y old  Male who presents with a chief complaint of Decreased urine output for the past week, leg oedema (26 May 2022 09:39)    	        PAST MEDICAL & SURGICAL HISTORY:  HTN (hypertension), benign      HLD (hyperlipidemia)      DM type 2 (diabetes mellitus, type 2)      TIA (transient ischemic attack)      Atrial fibrillation      MI (myocardial infarction)  circa 2014 and 2022.      CAD (coronary artery disease)      Neuropathy      Stage 3 chronic kidney disease      2019 novel coronavirus disease (COVID-19)  12/2021.  Received the COVID-19 vaccine x 2 as of April 2022.      Status post angioplasty with stent  LULU x 3 2/7/2014      S/P carotid endarterectomy  left                RESPIRATORY: No cough, wheezing, chills or hemoptysis; No Shortness of Breath  CARDIOVASCULAR: No chest pain, palpitations, passing out, dizziness,   GASTROINTESTINAL: No abdominal or epigastric pain. No nausea, vomiting, or hematemesis;  GENITOURINARY: No dysuria, frequency, hematuria,   NEUROLOGICAL: No headaches,     Medications:  MEDICATIONS  (STANDING):  allopurinol 100 milliGRAM(s) Oral daily  apixaban 5 milliGRAM(s) Oral two times a day  aspirin  chewable 81 milliGRAM(s) Oral daily  atorvastatin 40 milliGRAM(s) Oral at bedtime  chlorhexidine 4% Liquid 1 Application(s) Topical <User Schedule>  dextrose 50% Injectable 25 Gram(s) IV Push once  dextrose 50% Injectable 25 Gram(s) IV Push once  dextrose 50% Injectable 12.5 Gram(s) IV Push once  diltiazem    milliGRAM(s) Oral daily  insulin lispro (ADMELOG) corrective regimen sliding scale   SubCutaneous three times a day before meals  insulin lispro (ADMELOG) corrective regimen sliding scale   SubCutaneous at bedtime  lidocaine   4% Patch 1 Patch Transdermal daily  metoprolol succinate  milliGRAM(s) Oral daily  multivitamin/minerals 1 Tablet(s) Oral daily  mupirocin 2% Ointment 1 Application(s) Both Nostrils two times a day  polyethylene glycol 3350 17 Gram(s) Oral two times a day  povidone iodine 10% Solution 1 Application(s) Topical daily  senna 2 Tablet(s) Oral at bedtime  sodium chloride 0.9%. 1000 milliLiter(s) (50 mL/Hr) IV Continuous <Continuous>    MEDICATIONS  (PRN):  bisacodyl 5 milliGRAM(s) Oral every 12 hours PRN Constipation  dextrose Oral Gel 15 Gram(s) Oral once PRN Blood Glucose LESS THAN 70 milliGRAM(s)/deciliter  sodium chloride 0.9% lock flush 10 milliLiter(s) IV Push every 1 hour PRN Pre/post blood products, medications, blood draw, and to maintain line patency    	    PHYSICAL EXAM:  T(C): 36.4 (05-26-22 @ 08:53), Max: 36.5 (05-25-22 @ 11:22)  HR: 87 (05-26-22 @ 08:53) (79 - 87)  BP: 145/70 (05-26-22 @ 08:53) (144/78 - 155/73)  RR: 18 (05-26-22 @ 08:53) (18 - 18)  SpO2: 94% (05-26-22 @ 08:53) (91% - 94%)  Wt(kg): --  I&O's Summary    25 May 2022 07:01  -  26 May 2022 07:00  --------------------------------------------------------  IN: 200 mL / OUT: 0 mL / NET: 200 mL        Appearance: Normal	  HEENT:   Normal oral mucosa, PERRL, EOMI	  Lymphatic: No lymphadenopathy  Cardiovascular: Normal S1 S2, No JVD,   Respiratory:dec bs   Gastrointestinal:  Soft, Non-tender, + BS	  	  Neurologic: Non-focal  Extremities: dec rom   pvd     TELEMETRY: 	    ECG:  	  RADIOLOGY:  OTHER: 	  	  LABS:	 	    CARDIAC MARKERS:                                8.2    10.43 )-----------( 346      ( 26 May 2022 07:30 )             28.9     05-26    137  |  96  |  26<H>  ----------------------------<  157<H>  3.9   |  24  |  5.44<H>    Ca    8.8      26 May 2022 07:15  Phos  4.2     05-24  Mg     2.3     05-24    TPro  6.4  /  Alb  3.2<L>  /  TBili  0.3  /  DBili  x   /  AST  8<L>  /  ALT  <5<L>  /  AlkPhos  132<H>  05-24    proBNP:   Lipid Profile:   HgA1c:   TSH:

## 2022-05-26 NOTE — PROGRESS NOTE ADULT - ASSESSMENT
Echo 5/13/22:  1. Normal left ventricular internal dimensions and wall  thicknesses.  2. Endocardial visualization enhanced with intravenous  injection of Ultrasonic Enhancing Agent (Lumason). Grossly  borderline normal left ventricular systolic function; no  obvious segmental wall motion abnormality.  3. The right ventricle is not well visualized.  *** Compared with echocardiogram of 2/22/2022, no  significant changes noted.      A/P    68 y/o M with history of CAD, s/p multiple PCI, with most recent 2/22 PCI of LAD in setting of NSTEMI, on ASA only (pradaxa), chronic atrial fibrillation maintained on Pradaxa, essential HTN, type 2 DM previously on oral medications but on insulin, diabetic neuropathy with chronic RIGHT LE venous stasis changes>left, LEFT foot ulcer seen by podiatry, past endarterectomy LEFT CEA in 2014 presenting with 1 week of decreased urine output, cystitis with hematuria     #ANT on CKD, new HD  -oliguric renal failure in setting of UTI/cystitis  -New HD for uremia, renal failure  -sp rrt 4/12 for uncontrolled bleeding sp  right groin shiley removal  - monitor h/h - stable  -s/p L AVG 4/18  -s/p permacath placement 4/20  -renal f/ u    #AMS/Lethargy  -recent ams from pain meds  -AMS sp RRT 4/27  likely secondary to pain med   -repeat CT 4/27 unremarkable --  discontinued Percocet  -MRI 5/11  revealed small acute infarctions in bilateral posterior centrum semiovale and left corona radiata and left posterior periventricular white matter  -on asa statin  -repeat echo with no interval changes  -CTa head neck noted; neuro following     #Acute on chronic HFpEF  -stable  -continue volume removal with HD  -c/w bb  -repeat echo with stable borderline lv fxn    #Chronic AF  -rates  stable- c/w  cardizem 240 mg daily   -c/w metoprolol succ 100 mg qd  -c/w eliquis 5 mg BID for now - heme stable     #HTN  -stable  -c/w meds       #CAD, s/p PCI, most recent 2/2022  -stable, cont asa  -off plavix due to a/c indication  -statin     #carotid stenosis   -previous MRA  noted with Greater than 75% stenosis of the right distal common carotid artery. Approximately 60% stenosis of the proximal left internal carotid artery.  -carotid dopplers 5/11 noted :  There is new occlusion of the left internal carotid artery;  greater than 70% stenosis involving the distal right common carotid artery as was seen previously. Mild to moderate flow-limiting stenosis is seen at the left external   carotid artery.  -CTa head and neck 5/12 noted  -Continue ASA and Statin Therapy  -neuro fu  / work up / imaging per vascular   -mra head/ neck noted- per vascular outpt follow up for repeat to re eval w MRI / mra of the brain neck   -neurosx to  FU _ planning angio, possible stent ?? - last note from 5/24

## 2022-05-26 NOTE — PROGRESS NOTE ADULT - PROBLEM SELECTOR PLAN 1
Likely 2nd to b/l pl effusions, atelectasis  -Suspect fluid overload given elevated proBNP, LE edema on admission   -Keep O>I with HD as tolerated   -Pt with hx of hydroPTX. CT chest in 2/2022 with pleural thickening, atelectasis. Findings at the time suspected to be chronic. CT A/P 4/2 with small b/l pl effusions L>R, pleural thickening  -CT chest now with small b/l pl effusion R>L, bibasilar atelectasis  -Keep sats >90% with supplemental O2 as needed (currently RA)  -CXR 5/12 with small L effusion  -Appears mildly labored today. Check CXR.

## 2022-05-26 NOTE — PROGRESS NOTE ADULT - ASSESSMENT
67 year old gentleman with PMH of CAD, NSTEMI s/p 3 stents in 2014 (stents to LAD, proximal and distal LCx), ischemic cardiomyopathy, HTN for 10 years, T2DM for 26 years c/b peripheral neuropathy, CVA, TIA, Afib on Pradaxa, chronic RLE wound, L carotid stenosis s/p endarterectomy (2014) just recently admitted  to the [facility] on 2/19/2022 with acute onset chest pain for one day found to have an NSTEMI, CAD, s/p PCI in setting of increased AF rates off bb for 3 days and resolved chest pain, his CKMB peaked and Echo noted with EF 60%,normal LV function, mild TR, mild [place]. He was s/p [facility] with 85% proximal LAD s/p balloon angioplasty and LULU. He presented to [facility] ED with decreased urine output over the week. Mr. Stevens went to [facility] for hematuria and was started  on Nitrofurantoin. Pt is still taking Nitrofurantoin w/ 5 days left. He came to the ED due to worsening symptoms. Pt reports having a similar incident 4-5 years ago that resolved spontaneously. He reports increased leg edema Dyspnea worse on exertion. his baseline Serum creatinine is in the 2.0 to 2.3 mg/dl range. ANT with serum creatinine of 8.29 with bun 144 and k 5.5      1- ESRD likely   2- chf chronic   3- hyperphosphatemia /shpt   4- anemia   5- hyperlipidemia   6- HTN /a fib  7- TIA hx   8- hx of carotid stenosis      HD   F200, 225 min, , ,  1 liter  fluid removal  see HD flowsheet  renvela on hold, phos normal range  cardizem increased per cards  continue metoprolol  calorie count  anemia - epogen 48335 Units TIW with HD.    venofer 100 mg IVP with HD  trend hgb

## 2022-05-26 NOTE — PROGRESS NOTE ADULT - SUBJECTIVE AND OBJECTIVE BOX
CARDIOLOGY FOLLOW UP - Dr. Mazariegos  DATE OF SERVICE:  5/26/22     CC no cp or sob       REVIEW OF SYSTEMS:  CONSTITUTIONAL: No fever, weight loss, or fatigue  RESPIRATORY: No cough, wheezing, chills or hemoptysis; No Shortness of Breath  CARDIOVASCULAR: No chest pain, palpitations, passing out, dizziness, or leg swelling  GASTROINTESTINAL: No abdominal or epigastric pain. No nausea, vomiting, or hematemesis; No diarrhea or constipation. No melena or hematochezia.      PHYSICAL EXAM:  T(C): 36.4 (05-26-22 @ 08:53), Max: 36.5 (05-25-22 @ 11:22)  HR: 87 (05-26-22 @ 08:53) (79 - 87)  BP: 145/70 (05-26-22 @ 08:53) (144/78 - 155/73)  RR: 18 (05-26-22 @ 08:53) (18 - 18)  SpO2: 94% (05-26-22 @ 08:53) (91% - 94%)  Wt(kg): --  I&O's Summary    25 May 2022 07:01  -  26 May 2022 07:00  --------------------------------------------------------  IN: 200 mL / OUT: 0 mL / NET: 200 mL        Appearance: Normal	  Cardiovascular: Normal S1 S2,RRR  Respiratory: Lungs clear to auscultation	  Gastrointestinal:  Soft, Non-tender, + BS	  Extremities: Normal range of motion, No clubbing, cyanosis or edema      Home Medications:  allopurinol 100 mg oral tablet: 1 tab(s) orally once a day (03 Apr 2022 06:34)  aspirin 81 mg oral delayed release tablet: 1 tab(s) orally once a day (03 Apr 2022 06:34)  bumetanide 2 mg oral tablet: 1 tab(s) orally once a day (03 Apr 2022 06:34)  insulin glargine 100 units/mL subcutaneous solution: 25 unit(s) subcutaneous once a day (at bedtime) (03 Apr 2022 06:34)  metOLazone 5 mg oral tablet: 1 tab(s) orally 3 times a week (03 Apr 2022 06:34)  metoprolol succinate 50 mg oral tablet, extended release: 2 tab(s) orally once a day (03 Apr 2022 06:34)  NovoLOG 100 units/mL subcutaneous solution: subcutaneous 4 times a day (before meals and at bedtime) (03 Apr 2022 06:34)  NovoLOG FlexPen 100 units/mL injectable solution: 10 unit(s) subcutaneous 3 times a day (03 Apr 2022 06:34)  oxycodone-acetaminophen 5 mg-325 mg oral tablet: 1 tab(s) orally 2 times a day (03 Apr 2022 06:34)  Pradaxa 150 mg oral capsule: 1 cap(s) orally 2 times a day (03 Apr 2022 06:34)  pregabalin 100 mg oral capsule: 1 cap(s) orally 3 times a day (03 Apr 2022 06:34)      MEDICATIONS  (STANDING):  allopurinol 100 milliGRAM(s) Oral daily  apixaban 5 milliGRAM(s) Oral two times a day  aspirin  chewable 81 milliGRAM(s) Oral daily  atorvastatin 40 milliGRAM(s) Oral at bedtime  chlorhexidine 4% Liquid 1 Application(s) Topical <User Schedule>  dextrose 50% Injectable 25 Gram(s) IV Push once  dextrose 50% Injectable 25 Gram(s) IV Push once  dextrose 50% Injectable 12.5 Gram(s) IV Push once  diltiazem    milliGRAM(s) Oral daily  insulin lispro (ADMELOG) corrective regimen sliding scale   SubCutaneous three times a day before meals  insulin lispro (ADMELOG) corrective regimen sliding scale   SubCutaneous at bedtime  lidocaine   4% Patch 1 Patch Transdermal daily  metoprolol succinate  milliGRAM(s) Oral daily  multivitamin/minerals 1 Tablet(s) Oral daily  mupirocin 2% Ointment 1 Application(s) Both Nostrils two times a day  polyethylene glycol 3350 17 Gram(s) Oral two times a day  povidone iodine 10% Solution 1 Application(s) Topical daily  senna 2 Tablet(s) Oral at bedtime  sodium chloride 0.9%. 1000 milliLiter(s) (50 mL/Hr) IV Continuous <Continuous>      TELEMETRY: 	    ECG:  	  RADIOLOGY:   DIAGNOSTIC TESTING:  [ ] Echocardiogram:  [ ]  Catheterization:  [ ] Stress Test:    OTHER: 	    LABS:	 	                            8.2    10.43 )-----------( 346      ( 26 May 2022 07:30 )             28.9     05-26    137  |  96  |  26<H>  ----------------------------<  157<H>  3.9   |  24  |  5.44<H>    Ca    8.8      26 May 2022 07:15  Phos  4.2     05-24  Mg     2.3     05-24    TPro  6.4  /  Alb  3.2<L>  /  TBili  0.3  /  DBili  x   /  AST  8<L>  /  ALT  <5<L>  /  AlkPhos  132<H>  05-24    PT/INR - ( 26 May 2022 07:30 )   PT: 21.0 sec;   INR: 1.80 ratio         PTT - ( 26 May 2022 07:30 )  PTT:31.7 sec

## 2022-05-26 NOTE — PROGRESS NOTE ADULT - ASSESSMENT
·  Problem:[email or web address] hemodialysis per renal  to cont as per renal       Problem/Plan - 2:  ·  Problem: Chronic atrial fibrillation. c/w a/c  cards f/u     Problem/Plan - 3:  ·  Problem: Cystitis.   ID follow up appreciated        Problem/Plan - 4:  ·  Problem: Type 2 diabetes mellitus. c/w insulin   endo f/u      Problem/Plan - 5:  ·  Problem: CAD (coronary artery disease).   rapid a fib better  meds adjusted  hr stable  c/w a/c       Persistent encephalopathy   neuro follow up appreciated  f/u labs   mental status better  polypharmacy contributing and volume status   minimizing meds     carotid duplex noted  cta noted  mra/ mri noted  tx plans as per vasc / neuro   neuro f/u noted  neuro sx to do carotid stent   stent likely friday

## 2022-05-26 NOTE — PROGRESS NOTE ADULT - SUBJECTIVE AND OBJECTIVE BOX
Subjective:  at HD    Medications:  allopurinol 100 milliGRAM(s) Oral daily  apixaban 5 milliGRAM(s) Oral two times a day  aspirin  chewable 81 milliGRAM(s) Oral daily  atorvastatin 40 milliGRAM(s) Oral at bedtime  bisacodyl 5 milliGRAM(s) Oral every 12 hours PRN  chlorhexidine 4% Liquid 1 Application(s) Topical <User Schedule>  dextrose 50% Injectable 25 Gram(s) IV Push once  dextrose 50% Injectable 25 Gram(s) IV Push once  dextrose 50% Injectable 12.5 Gram(s) IV Push once  dextrose Oral Gel 15 Gram(s) Oral once PRN  diltiazem    milliGRAM(s) Oral daily  epoetin naz-epbx (RETACRIT) Injectable 47476 Unit(s) IV Push once  insulin lispro (ADMELOG) corrective regimen sliding scale   SubCutaneous three times a day before meals  insulin lispro (ADMELOG) corrective regimen sliding scale   SubCutaneous at bedtime  lidocaine   4% Patch 1 Patch Transdermal daily  metoprolol succinate  milliGRAM(s) Oral daily  multivitamin/minerals 1 Tablet(s) Oral daily  mupirocin 2% Ointment 1 Application(s) Both Nostrils two times a day  polyethylene glycol 3350 17 Gram(s) Oral two times a day  povidone iodine 10% Solution 1 Application(s) Topical daily  senna 2 Tablet(s) Oral at bedtime  sodium chloride 0.9% lock flush 10 milliLiter(s) IV Push every 1 hour PRN  sodium chloride 0.9%. 1000 milliLiter(s) IV Continuous <Continuous>      Labs:  CBC Full  -  ( 26 May 2022 07:30 )  WBC Count : 10.43 K/uL  RBC Count : 3.44 M/uL  Hemoglobin : 8.2 g/dL  Hematocrit : 28.9 %  Platelet Count - Automated : 346 K/uL  Mean Cell Volume : 84.0 fl  Mean Cell Hemoglobin : 23.8 pg  Mean Cell Hemoglobin Concentration : 28.4 gm/dL  Auto Neutrophil # : x  Auto Lymphocyte # : x  Auto Monocyte # : x  Auto Eosinophil # : x  Auto Basophil # : x  Auto Neutrophil % : x  Auto Lymphocyte % : x  Auto Monocyte % : x  Auto Eosinophil % : x  Auto Basophil % : x    05-26    137  |  96  |  26<H>  ----------------------------<  157<H>  3.9   |  24  |  5.44<H>    Ca    8.8      26 May 2022 07:15  Phos  4.2     05-24  Mg     2.3     05-24    TPro  6.4  /  Alb  3.2<L>  /  TBili  0.3  /  DBili  x   /  AST  8<L>  /  ALT  <5<L>  /  AlkPhos  132<H>  05-24    CAPILLARY BLOOD GLUCOSE      POCT Blood Glucose.: 167 mg/dL (26 May 2022 08:32)  POCT Blood Glucose.: 170 mg/dL (25 May 2022 21:23)  POCT Blood Glucose.: 176 mg/dL (25 May 2022 16:52)  POCT Blood Glucose.: 187 mg/dL (25 May 2022 12:03)    LIVER FUNCTIONS - ( 24 May 2022 14:22 )  Alb: 3.2 g/dL / Pro: 6.4 g/dL / ALK PHOS: 132 U/L / ALT: <5 U/L / AST: 8 U/L / GGT: x           PT/INR - ( 26 May 2022 07:30 )   PT: 21.0 sec;   INR: 1.80 ratio         PTT - ( 26 May 2022 07:30 )  PTT:31.7 sec        Vitals:  Vital Signs Last 24 Hrs  T(C): 36.4 (26 May 2022 08:53), Max: 36.5 (25 May 2022 11:22)  T(F): 97.5 (26 May 2022 08:53), Max: 97.7 (25 May 2022 11:22)  HR: 87 (26 May 2022 08:53) (79 - 87)  BP: 145/70 (26 May 2022 08:53) (144/78 - 155/73)  BP(mean): --  RR: 18 (26 May 2022 08:53) (18 - 18)  SpO2: 94% (26 May 2022 08:53) (91% - 94%)    NEUROLOGICAL EXAM:    Mental status: awake, tired, no active distress.  He is oriented to person, knew in HD and in hospital He intermittently follows very simple commands and names very simple objects.        Cranial Nerves: Pupils were equal, round, reactive to light. Extraocular movements were intact. B BTT Facial sensation was intact to light touch. There was no facial asymmetry. The palate was upgoing symmetrically and tongue was midline. Hearing acuity was intact to finger rub AU. Shoulder shrug was full bilaterally    Motor exam: Bulk and tone were normal. moves all ext antigravity but drifts to bed.  There is relative right leg weakness in setting of pain which has been previously present.  Fine finger movements were limited by mental status.  There was no pronator drift    Reflexes: DTRs depressed, flexor plantars.     Sensation: Intact to noxious, seems intact to light touch, due to decreased attention did not assess other modalities     Coordination: no gross dysmetria    Gait: deferred    NIHSS: 12    Imaging:    ACC: [id number] EXAM:  CT BRAIN                        PROCEDURE DATE:  04/09/2022      INTERPRETATION:  CLINICAL INFORMATION:  Altered mental status, on   anticoagulation .    TECHNIQUE: Multiple contiguous axial images were acquired from the   skullbase to the vertex without the administration of intravenous   contrast.  Coronal and sagittal reformations were made.    COMPARISON: CT head 10/21/2021, brain MRI 02/23/2014.    FINDINGS:    Scattered lacunar infarcts in the bilateral cerebralhemispheres are   grossly stable compared to the 10/21/2021 head CT. No new additional   infarcts are noted over the time interval.    No acute intracranial hemorrhage, mass effect, or midline shift. The   brain demonstrates periventricular hypoattenuation, nonspecific in   etiology but likely representing chronic microvascular ischemic changes.    No abnormal extra-axial fluid collections are present.    The ventricles and sulci demonstrate age appropriate involutional   changes.  The basal cisterns are patent.    The orbits are unremarkable. The paranasal sinuses are clear. The mastoid   air cells are clear. The calvarium is intact.    IMPRESSION:    No acute intracranial abnormality is noted. If the patient has new and   persistent symptoms, consider short interval follow-up head CT or brain   MRI.    --- End of Report ---    GATITO VILLARREAL MD; Resident Radiologist  This document has been electronically signed.  JESSE CORONA MD; Attending Radiologist  This document has been electronically signed. Apr 9 2022  2:00PM        ACC: [id number] EXAM:  MR BRAIN                          PROCEDURE DATE:  05/11/2022          INTERPRETATION:  MR brain  without gadolinium    CLINICAL INFORMATION: Stroke.    TECHNIQUE: Limited Sagittal T1-weighted images, axial FLAIR images, and   axial diffusion weighted images of the brain were obtained.  Patient was   unable to hold still for remaining sequences.    FINDINGS:   MRI dated 02/23/2014 available for review.    The brain demonstrates small acute infarctions in the BILATERAL posterior   centrum semiovale and LEFT corona radiata and LEFT posterior   periventricular white matter with restricted diffusion. No intracranial   hemorrhage is recognized. No mass effect is found in the brain.    The ventricles, sulci and basal cisterns show mild global atrophy most   prominent within the BILATERAL cerebellum.    The vertebral and internal carotid arteries demonstrate expected flow   voids indicating their patency.    The orbits are unremarkable.  The paranasal sinuses are clear.  The nasal   cavity appears intact.  The nasopharynx is symmetric.  The central skull   base and temporal bones are intact.  The calvarium appears unremarkable.    IMPRESSION: Small acute infarctions in the BILATERAL posterior centrum   semiovale and LEFT corona radiata and LEFT posterior periventricular   white matter with restricted diffusion.   mild global atrophy most   prominent within the BILATERAL cerebellum.    --- End of Report ---            JESÚS PADGETT MD; Attending Radiologist  This document has been electronically signed. May 11 2022  5:47PM        Patient name: DYLAN DEL CASTILLO  YOB: 1955   Age: 67 (M)   MR#: [id number]  Study Date: 2/22/2022  [facility]  Study quality: Technically difficult  Referring Physician: Mendoza Corral MD  Blood Pressure: 152/82 mmHg  Height: 180 cm  Weight: 136 kg  BSA: 2.5 m2  ------------------------------------------------------------------------  PROCEDURE: Transthoracic echocardiogram with 2-D, M-Mode  and complete spectral and color flow Doppler. Verbal  consent was obtained for injection of  Ultrasonic Enhancing  Agent following a discussion of risks and benefits.  Following intravenous injection of Ultrasonic Enhancing  Agent , harmonic imaging was performed.  INDICATION: Chest pain, unspecified (R07.9)  ------------------------------------------------------------------------  Dimensions:    Normal Values:  LA:     4.4    2.0 - 4.0 cm  Ao:     4.0    2.0 - 3.8 cm  SEPTUM: 1.1    0.6 - 1.2 cm  PWT:    0.9    0.6 - 1.1 cm  LVIDd:  5.0    3.0 - 5.6 cm  LVIDs:  3.5    1.8 - 4.0 cm  Derived variables:  LVMI: 73 g/m2  RWT: 0.36  EF (Visual Estimate): 60 %  Doppler Peak Velocity (m/sec): AoV=1.5 TV=2.2  ------------------------------------------------------------------------  Observations:  Mitral Valve: Normal mitral valve.  Aortic Valve/Aorta: Calcified aortic valve with normal  opening. Minimal aortic regurgitation.  Normal aortic root size.  Left Atrium: Mildly dilated left atrium.  Left Ventricle: Endocardial visualization enhanced with  intravenous injection of Ultrasonic Enhancing Agent  (Definity).  Normal left ventricular internal dimensions and wall  thickness.  Normal left ventricular systolic function. No segmental  wall motion abnormalities.  Right Heart: Normal right atrium. Normal right ventricular  size and function.  Normal tricuspid valve. Mild tricuspid regurgitation.  Normal pulmonic valve. Mild pulmonic regurgitation.  Pericardium/Pleura: Normal pericardium with no pericardial  effusion.  Hemodynamic: No evidence of pulmonary hypertension.  ------------------------------------------------------------------------  Conclusions:  Endocardial visualization enhanced with intravenous  injection of Ultrasonic Enhancing Agent (Definity).  Normal left ventricular systolic function. No segmental  wall motion abnormalities.  ------------------------------------------------------------------------  Confirmed on  2/22/2022 - 16:17:39 by Ishan Ackerman MD, FASE  ------------------------------------------------------------------------    < from: CT Angio Head w/ IV Cont (05.12.22 @ 13:34) >    ACC: [id number] EXAM:  CT ANGIO NECK (W)AW IC                        ACC: [id number] EXAM:  CT ANGIO BRAIN (W)AW IC                          PROCEDURE DATE:  05/12/2022          INTERPRETATION:  CT angiography of the brain and neck    CLINICAL INDICATION: Recent infarcts. Carotid stenosis.    TECHNIQUE: Direct axial CT scanning of the brain and neck was obtained   from the vertex to the level of the clavicular heads after the dynamic   intravenous injection of 44 cc of Omnipaque 300. 0 cc were discarded.   Sagittal and coronal maximum intensity projection reformats were   provided.  Three-dimensional reconstructions were performed by the   radiologist using the [employer] workstation.    Additionally, noncontrast axial CT scanning of the brain was obtained   from the skull base to the vertex. Sagittal and coronal reformats were   provided.    COMPARISON: MRA neck 10/24/2021    FINDINGS:    CT brain:    Previously seen acute infarcts in the bilateral cerebral hemispheres are   redemonstrated. No CT evidence for hemorrhagic transformation.    No hydrocephalus, midline shift, or effacement of basal cisterns.    No acute intracranial hemorrhage or vasogenic edema.    Mild to moderate white matter microvascular ischemic disease.    CTA brain:    Limited by relatively decreased opacification of the arteries.    Multifocal narrowing of the right skull base ICA due to calcified plaque,   the degree of which is not well evaluated.    Normal flow-related enhancement of the supraclinoid right ICA.    Lack of flow related enhancement of the petrous, precavernous, and   cavernous left ICA. Reconstitution of the vessel at its supraclinoid   segment.    Otherwise no flow-limiting stenosis.    Normal flow-related enhancement of the bilateral anterior, middle, and   posterior cerebral arteries.    Normal flow-related enhancement of the intradural vertebral arteries and   basilar artery.    No vascular aneurysm or AVM.    CTA neck:    Stenoses described in this report are on the basis of NASCET criteria.    Study is significantly limited by relatively decreased opacification of   the arteries.    Short segment stenosis of the mid left common carotid artery due to the   presence of partially calcified plaque is poorly evaluated. Flow-related   enhancement of the more proximal and distal left common carotid artery is   noted.    Long segment stenosis of the mid and distal right common carotid artery   due to the presence of partially calcified plaque is poorly evaluated.    Rapidly progressive stenosis of the left internal carotid artery   beginning at its origin. No flow related enhancement of the vessel beyond   its origin.    Normal flow-related enhancement of the bilateral vertebral arteries.    No gross evidence for acute arterial dissection.    IMPRESSION:    CTA brain:  Limited by relatively decreased opacification of the arteries.    Multifocal narrowing of the right skull base ICA due to calcified plaque,   the degree of which is not well evaluated.    Lack of flow related enhancement of the petrous, precavernous, and   cavernous left ICA. Reconstitution of the vessel at its supraclinoid   segment.    Otherwise no flow-limiting stenosis.    No vascular aneurysm or AVM.    CTA neck:  Limited by relatively decreased opacification of the arteries.    Short segment stenosis of the mid left common carotid artery due to the   presence of partially calcified plaque is poorly evaluated. Flow-related   enhancement of the more proximal and distal left common carotid artery is   noted.    Long segment stenosis of the mid and distal right common carotid artery   due to the presence of partially calcified plaque is poorly evaluated.    Rapidly progressive stenosis of the left internal carotid artery   beginning at its origin. No flow related enhancement of the vessel beyond   its origin.    Recommend correlation with contrast-enhanced MRI of the neck for further   evaluation.    --- End of Report ---            MIGUEL ANGEL CARRILLO MD; Attending Radiologist  This document has been electronically signed. May 12 2022  2:47PM    < end of copied text >

## 2022-05-27 NOTE — PROGRESS NOTE ADULT - SUBJECTIVE AND OBJECTIVE BOX
Subjective:  in bed comfortable    Medications:  allopurinol 100 milliGRAM(s) Oral daily  apixaban 5 milliGRAM(s) Oral two times a day  aspirin  chewable 81 milliGRAM(s) Oral daily  atorvastatin 40 milliGRAM(s) Oral at bedtime  bisacodyl 5 milliGRAM(s) Oral every 12 hours PRN  chlorhexidine 4% Liquid 1 Application(s) Topical <User Schedule>  dextrose 50% Injectable 25 Gram(s) IV Push once  dextrose 50% Injectable 12.5 Gram(s) IV Push once  dextrose 50% Injectable 25 Gram(s) IV Push once  dextrose Oral Gel 15 Gram(s) Oral once PRN  diltiazem    milliGRAM(s) Oral daily  insulin lispro (ADMELOG) corrective regimen sliding scale   SubCutaneous three times a day before meals  insulin lispro (ADMELOG) corrective regimen sliding scale   SubCutaneous at bedtime  lidocaine   4% Patch 1 Patch Transdermal daily  metoprolol succinate  milliGRAM(s) Oral daily  multivitamin/minerals 1 Tablet(s) Oral daily  mupirocin 2% Ointment 1 Application(s) Both Nostrils two times a day  polyethylene glycol 3350 17 Gram(s) Oral two times a day  povidone iodine 10% Solution 1 Application(s) Topical daily  senna 2 Tablet(s) Oral at bedtime  sodium chloride 0.9% lock flush 10 milliLiter(s) IV Push every 1 hour PRN      Labs:  CBC Full  -  ( 27 May 2022 07:10 )  WBC Count : 10.23 K/uL  RBC Count : 3.40 M/uL  Hemoglobin : 8.1 g/dL  Hematocrit : 28.6 %  Platelet Count - Automated : 315 K/uL  Mean Cell Volume : 84.1 fl  Mean Cell Hemoglobin : 23.8 pg  Mean Cell Hemoglobin Concentration : 28.3 gm/dL  Auto Neutrophil # : x  Auto Lymphocyte # : x  Auto Monocyte # : x  Auto Eosinophil # : x  Auto Basophil # : x  Auto Neutrophil % : x  Auto Lymphocyte % : x  Auto Monocyte % : x  Auto Eosinophil % : x  Auto Basophil % : x    05-27    139  |  98  |  16  ----------------------------<  144<H>  4.0   |  27  |  4.48<H>    Ca    8.8      27 May 2022 06:59      CAPILLARY BLOOD GLUCOSE      POCT Blood Glucose.: 139 mg/dL (27 May 2022 05:20)  POCT Blood Glucose.: 149 mg/dL (26 May 2022 21:29)  POCT Blood Glucose.: 144 mg/dL (26 May 2022 17:02)  POCT Blood Glucose.: 116 mg/dL (26 May 2022 12:54)      PT/INR - ( 27 May 2022 07:07 )   PT: 23.0 sec;   INR: 1.97 ratio         PTT - ( 27 May 2022 07:07 )  PTT:32.5 sec        Vitals:  Vital Signs Last 24 Hrs  T(C): 36.6 (27 May 2022 05:29), Max: 36.7 (26 May 2022 12:53)  T(F): 97.8 (27 May 2022 05:29), Max: 98 (26 May 2022 12:53)  HR: 90 (27 May 2022 05:29) (82 - 98)  BP: 152/64 (27 May 2022 05:29) (125/62 - 153/78)  BP(mean): --  RR: 17 (27 May 2022 05:29) (17 - 18)  SpO2: 95% (27 May 2022 05:29) (95% - 97%)    NEUROLOGICAL EXAM:    Mental status: awake, tired, no active distress.  He is oriented to person,  in hospital He intermittently follows very simple commands and names very simple objects.        Cranial Nerves: Pupils were equal, round, reactive to light. Extraocular movements were intact. B BTT Facial sensation was intact to light touch. There was no facial asymmetry. The palate was upgoing symmetrically and tongue was midline. Shoulder shrug was full bilaterally    Motor exam: Bulk and tone were normal. moves all ext antigravity but drifts to bed.  There is relative right leg weakness in setting of pain which has been previously present.  Fine finger movements were limited by mental status.  There was no pronator drift    Reflexes: DTRs depressed, flexor plantars.     Sensation: Intact to noxious, seems intact to light touch, due to decreased attention did not assess other modalities     Coordination: no gross dysmetria    Gait: deferred    NIHSS: 12    Imaging:    ACC: [id number] EXAM:  CT BRAIN                        PROCEDURE DATE:  04/09/2022      INTERPRETATION:  CLINICAL INFORMATION:  Altered mental status, on   anticoagulation .    TECHNIQUE: Multiple contiguous axial images were acquired from the   skullbase to the vertex without the administration of intravenous   contrast.  Coronal and sagittal reformations were made.    COMPARISON: CT head 10/21/2021, brain MRI 02/23/2014.    FINDINGS:    Scattered lacunar infarcts in the bilateral cerebralhemispheres are   grossly stable compared to the 10/21/2021 head CT. No new additional   infarcts are noted over the time interval.    No acute intracranial hemorrhage, mass effect, or midline shift. The   brain demonstrates periventricular hypoattenuation, nonspecific in   etiology but likely representing chronic microvascular ischemic changes.    No abnormal extra-axial fluid collections are present.    The ventricles and sulci demonstrate age appropriate involutional   changes.  The basal cisterns are patent.    The orbits are unremarkable. The paranasal sinuses are clear. The mastoid   air cells are clear. The calvarium is intact.    IMPRESSION:    No acute intracranial abnormality is noted. If the patient has new and   persistent symptoms, consider short interval follow-up head CT or brain   MRI.    --- End of Report ---    GATITO VILLARREAL MD; Resident Radiologist  This document has been electronically signed.  JESSE CORONA MD; Attending Radiologist  This document has been electronically signed. Apr 9 2022  2:00PM        ACC: [id number] EXAM:  MR BRAIN                          PROCEDURE DATE:  05/11/2022          INTERPRETATION:  MR brain  without gadolinium    CLINICAL INFORMATION: Stroke.    TECHNIQUE: Limited Sagittal T1-weighted images, axial FLAIR images, and   axial diffusion weighted images of the brain were obtained.  Patient was   unable to hold still for remaining sequences.    FINDINGS:   MRI dated 02/23/2014 available for review.    The brain demonstrates small acute infarctions in the BILATERAL posterior   centrum semiovale and LEFT corona radiata and LEFT posterior   periventricular white matter with restricted diffusion. No intracranial   hemorrhage is recognized. No mass effect is found in the brain.    The ventricles, sulci and basal cisterns show mild global atrophy most   prominent within the BILATERAL cerebellum.    The vertebral and internal carotid arteries demonstrate expected flow   voids indicating their patency.    The orbits are unremarkable.  The paranasal sinuses are clear.  The nasal   cavity appears intact.  The nasopharynx is symmetric.  The central skull   base and temporal bones are intact.  The calvarium appears unremarkable.    IMPRESSION: Small acute infarctions in the BILATERAL posterior centrum   semiovale and LEFT corona radiata and LEFT posterior periventricular   white matter with restricted diffusion.   mild global atrophy most   prominent within the BILATERAL cerebellum.    --- End of Report ---            JESÚS PADGETT MD; Attending Radiologist  This document has been electronically signed. May 11 2022  5:47PM        Patient name: DYLAN DEL CASTILLO  YOB: 1955   Age: 67 (M)   MR#: [id number]  Study Date: 2/22/2022  [facility]  Study quality: Technically difficult  Referring Physician: Mendoza Corral MD  Blood Pressure: 152/82 mmHg  Height: 180 cm  Weight: 136 kg  BSA: 2.5 m2  ------------------------------------------------------------------------  PROCEDURE: Transthoracic echocardiogram with 2-D, M-Mode  and complete spectral and color flow Doppler. Verbal  consent was obtained for injection of  Ultrasonic Enhancing  Agent following a discussion of risks and benefits.  Following intravenous injection of Ultrasonic Enhancing  Agent , harmonic imaging was performed.  INDICATION: Chest pain, unspecified (R07.9)  ------------------------------------------------------------------------  Dimensions:    Normal Values:  LA:     4.4    2.0 - 4.0 cm  Ao:     4.0    2.0 - 3.8 cm  SEPTUM: 1.1    0.6 - 1.2 cm  PWT:    0.9    0.6 - 1.1 cm  LVIDd:  5.0    3.0 - 5.6 cm  LVIDs:  3.5    1.8 - 4.0 cm  Derived variables:  LVMI: 73 g/m2  RWT: 0.36  EF (Visual Estimate): 60 %  Doppler Peak Velocity (m/sec): AoV=1.5 TV=2.2  ------------------------------------------------------------------------  Observations:  Mitral Valve: Normal mitral valve.  Aortic Valve/Aorta: Calcified aortic valve with normal  opening. Minimal aortic regurgitation.  Normal aortic root size.  Left Atrium: Mildly dilated left atrium.  Left Ventricle: Endocardial visualization enhanced with  intravenous injection of Ultrasonic Enhancing Agent  (Definity).  Normal left ventricular internal dimensions and wall  thickness.  Normal left ventricular systolic function. No segmental  wall motion abnormalities.  Right Heart: Normal right atrium. Normal right ventricular  size and function.  Normal tricuspid valve. Mild tricuspid regurgitation.  Normal pulmonic valve. Mild pulmonic regurgitation.  Pericardium/Pleura: Normal pericardium with no pericardial  effusion.  Hemodynamic: No evidence of pulmonary hypertension.  ------------------------------------------------------------------------  Conclusions:  Endocardial visualization enhanced with intravenous  injection of Ultrasonic Enhancing Agent (Definity).  Normal left ventricular systolic function. No segmental  wall motion abnormalities.  ------------------------------------------------------------------------  Confirmed on  2/22/2022 - 16:17:39 by Ishan Ackerman MD, FASE  ------------------------------------------------------------------------    < from: CT Angio Head w/ IV Cont (05.12.22 @ 13:34) >    ACC: [id number] EXAM:  CT ANGIO NECK (W)AW IC                        ACC: [id number] EXAM:  CT ANGIO BRAIN (W)AW IC                          PROCEDURE DATE:  05/12/2022          INTERPRETATION:  CT angiography of the brain and neck    CLINICAL INDICATION: Recent infarcts. Carotid stenosis.    TECHNIQUE: Direct axial CT scanning of the brain and neck was obtained   from the vertex to the level of the clavicular heads after the dynamic   intravenous injection of 44 cc of Omnipaque 300. 0 cc were discarded.   Sagittal and coronal maximum intensity projection reformats were   provided.  Three-dimensional reconstructions were performed by the   radiologist using the [employer] workstation.    Additionally, noncontrast axial CT scanning of the brain was obtained   from the skull base to the vertex. Sagittal and coronal reformats were   provided.    COMPARISON: MRA neck 10/24/2021    FINDINGS:    CT brain:    Previously seen acute infarcts in the bilateral cerebral hemispheres are   redemonstrated. No CT evidence for hemorrhagic transformation.    No hydrocephalus, midline shift, or effacement of basal cisterns.    No acute intracranial hemorrhage or vasogenic edema.    Mild to moderate white matter microvascular ischemic disease.    CTA brain:    Limited by relatively decreased opacification of the arteries.    Multifocal narrowing of the right skull base ICA due to calcified plaque,   the degree of which is not well evaluated.    Normal flow-related enhancement of the supraclinoid right ICA.    Lack of flow related enhancement of the petrous, precavernous, and   cavernous left ICA. Reconstitution of the vessel at its supraclinoid   segment.    Otherwise no flow-limiting stenosis.    Normal flow-related enhancement of the bilateral anterior, middle, and   posterior cerebral arteries.    Normal flow-related enhancement of the intradural vertebral arteries and   basilar artery.    No vascular aneurysm or AVM.    CTA neck:    Stenoses described in this report are on the basis of NASCET criteria.    Study is significantly limited by relatively decreased opacification of   the arteries.    Short segment stenosis of the mid left common carotid artery due to the   presence of partially calcified plaque is poorly evaluated. Flow-related   enhancement of the more proximal and distal left common carotid artery is   noted.    Long segment stenosis of the mid and distal right common carotid artery   due to the presence of partially calcified plaque is poorly evaluated.    Rapidly progressive stenosis of the left internal carotid artery   beginning at its origin. No flow related enhancement of the vessel beyond   its origin.    Normal flow-related enhancement of the bilateral vertebral arteries.    No gross evidence for acute arterial dissection.    IMPRESSION:    CTA brain:  Limited by relatively decreased opacification of the arteries.    Multifocal narrowing of the right skull base ICA due to calcified plaque,   the degree of which is not well evaluated.    Lack of flow related enhancement of the petrous, precavernous, and   cavernous left ICA. Reconstitution of the vessel at its supraclinoid   segment.    Otherwise no flow-limiting stenosis.    No vascular aneurysm or AVM.    CTA neck:  Limited by relatively decreased opacification of the arteries.    Short segment stenosis of the mid left common carotid artery due to the   presence of partially calcified plaque is poorly evaluated. Flow-related   enhancement of the more proximal and distal left common carotid artery is   noted.    Long segment stenosis of the mid and distal right common carotid artery   due to the presence of partially calcified plaque is poorly evaluated.    Rapidly progressive stenosis of the left internal carotid artery   beginning at its origin. No flow related enhancement of the vessel beyond   its origin.    Recommend correlation with contrast-enhanced MRI of the neck for further   evaluation.    --- End of Report ---            MIGUEL ANGEL CARRILLO MD; Attending Radiologist  This document has been electronically signed. May 12 2022  2:47PM    < end of copied text >

## 2022-05-27 NOTE — PROGRESS NOTE ADULT - ASSESSMENT
·  Problem:ESRD.co hemodialysis per renal  to cont as per renal       Problem/Plan - 2:  ·  Problem: Chronic atrial fibrillation. c/w a/c  cards f/u     Problem/Plan - 3:  ·  Problem: Cystitis.   ID follow up appreciated        Problem/Plan - 4:  ·  Problem: Type 2 diabetes mellitus. c/w insulin   endo f/u      Problem/Plan - 5:  ·  Problem: CAD (coronary artery disease).   rapid a fib better  meds adjusted  hr stable  c/w a/c       Persistent encephalopathy   neuro follow up appreciated  f/u labs   mental status better  polypharmacy contributing and volume status   minimizing meds     carotid duplex noted  cta noted  mra/ mri noted  tx plans as per vasc / neuro   neuro f/u noted  neuro sx cancelled procedure today  spoke w/ family at bedside

## 2022-05-27 NOTE — PROGRESS NOTE ADULT - ASSESSMENT
Echo 5/13/22:  1. Normal left ventricular internal dimensions and wall  thicknesses.  2. Endocardial visualization enhanced with intravenous  injection of Ultrasonic Enhancing Agent (Lumason). Grossly  borderline normal left ventricular systolic function; no  obvious segmental wall motion abnormality.  3. The right ventricle is not well visualized.  *** Compared with echocardiogram of 2/22/2022, no  significant changes noted.      A/P    66 y/o M with history of CAD, s/p multiple PCI, with most recent 2/22 PCI of LAD in setting of NSTEMI, on ASA only (pradaxa), chronic atrial fibrillation maintained on Pradaxa, essential HTN, type 2 DM previously on oral medications but on insulin, diabetic neuropathy with chronic RIGHT LE venous stasis changes>left, LEFT foot ulcer seen by podiatry, past endarterectomy LEFT CEA in 2014 presenting with 1 week of decreased urine output, cystitis with hematuria     #ANT on CKD, new HD  -oliguric renal failure in setting of UTI/cystitis  -New HD for uremia, renal failure  -sp rrt 4/12 for uncontrolled bleeding sp  right groin shiley removal  - monitor h/h - stable  -s/p L AVG 4/18  -s/p permacath placement 4/20  -renal f/ u    #AMS/Lethargy  -recent ams from pain meds  -AMS sp RRT 4/27  likely secondary to pain med   -repeat CT 4/27 unremarkable --  discontinued Percocet  -MRI 5/11  revealed small acute infarctions in bilateral posterior centrum semiovale and left corona radiata and left posterior periventricular white matter  -on asa statin  -repeat echo with no interval changes  -CTa head neck noted; neuro following     #Acute on chronic HFpEF  -stable  -continue volume removal with HD  -c/w bb  -repeat echo with stable borderline lv fxn    #Chronic AF  -rates  stable- c/w  cardizem 240 mg daily   -c/w metoprolol succ 100 mg qd  -c/w eliquis 5 mg BID for now - heme stable     #HTN  -stable  -c/w meds       #CAD, s/p PCI, most recent 2/2022  -stable, cont asa  -off plavix due to a/c indication  -statin     #carotid stenosis   -previous MRA  noted with Greater than 75% stenosis of the right distal common carotid artery. Approximately 60% stenosis of the proximal left internal carotid artery.  -carotid dopplers 5/11 noted :  There is new occlusion of the left internal carotid artery;  greater than 70% stenosis involving the distal right common carotid artery as was seen previously. Mild to moderate flow-limiting stenosis is seen at the left external   carotid artery.  -CTa head and neck 5/12 noted  -Continue ASA and Statin Therapy  -neuro fu  / work up / imaging per vascular   -mra head/ neck noted- per vascular outpt follow up for repeat to re eval w MRI / mra of the brain neck   -neurosx to  FU _ planning angio, possible stent ?? - last note from 5/24; per neuro plan for stent today

## 2022-05-27 NOTE — PROGRESS NOTE ADULT - ASSESSMENT
Impression:  This is a 67 year old gentleman pmhx CAD s/p MI/PCI/CABG, hx TIA, afib on rivaroxaban, HTN, DM2, PVD, gout, L CEA 2014, and CKD who presented to [facility] 4/11/22 with ANT, tremors, confusion and lethargy.  CT head no acute changes.  S/p initiation of hemodialysis.  Mental status has improved dramatically.  Pt denies any headaches, no slurred speech, no hemiparesis.  He has chronic gout with bilateral finger swelling, pain.  He also has chronic neuropathy.  Both legs are weak.      Persistent encephalopathy prompted re-consultation on 5/10/22.  MRI brain was ordered and revealed small acute infarctions in bilateral posterior centrum semiovale and left corona radiata and left posterior periventricular white matter.  Carotid ultrasound was performed showing new occlusion of the left internal carotid artery, along with greater than 70% stenosis involving the distal right common carotid artery, and mild to moderate flow-limiting stenosis is seen at the left external carotid artery.  Vascular surgery has been consulted and is recommending CTA.  He continues to be encephalopathic and lethargic.      Diagnosis and Recommendations:  1.  Multifactorial delirium - has been improving    2. Complete left ICA occlusion and 70% distal CCA stenosis. Has collateral flow via acomm and pcomm but should be re-evaluated for either CEA or carotid stenting. CEA may be ideal considering issues with medication compliance and potential difficulty adhering to DAPT regimen but defer to proceduralists regarding this. On 5/25, Dr David discussed extensively with son at bedside and daughter via FaceTime: all options here have risk associated with them.  Opening the artery may or may not help.  However, it will reduce long-term stroke risk.  It might help with his mental status by helping brain perfusion, though this cannot be guaranteed.  There is risk associated with procedure.    Further questions regarding the procedure will need to be addressed by neurosurgery.     3. Reviewed MRI - CVAs in bilateral watershed territory which can potentially contribute to delirium, those these CVAs do not appear particularly large.      - MRA was suboptimal not visualizing the neck but MRA head showed left KESHAWN coming from ACOM and left PCA feeding left MCA.  ICA showed no flow.    - in setting of atrial fibrillation infarcts could potentially also be cardioembolic, continue anticoagulation as per cardiology  - s/p carotid doppler, suboptimal CTA/MRA.  However, it is apparent that there is a proximal left ICA occlusion, which would not be a candidate for intervention.  Intervention for right CCA/ICA stenosis would be high risk but it may indeed by a symptomatic stenosis.   - ECHO in February 2022 did not reveal severe cardiomyopathy, vegetation, or LV thrombus.    - HgA1c of 11 also driving stroke risk.  Goal is < 7  - continue high-intensity statin therapy    for stent today  d/w pt and wife at bedside

## 2022-05-27 NOTE — PROGRESS NOTE ADULT - SUBJECTIVE AND OBJECTIVE BOX
[facility]   [phone]  RENAL FOLLOW UP NOTE  --------------------------------------------------------------------------------  Chief Complaint:    24 hour events/subjective:    seen earlier.  still confused but communicative     PAST HISTORY  --------------------------------------------------------------------------------  No significant changes to PMH, PSH, FHx, SHx, unless otherwise noted    ALLERGIES & MEDICATIONS  --------------------------------------------------------------------------------  Allergies    nitrofurantoin (Nephrotoxicity)    Intolerances      Standing Inpatient Medications  allopurinol 100 milliGRAM(s) Oral daily  apixaban 5 milliGRAM(s) Oral two times a day  aspirin  chewable 81 milliGRAM(s) Oral daily  atorvastatin 40 milliGRAM(s) Oral at bedtime  chlorhexidine 4% Liquid 1 Application(s) Topical <User Schedule>  dextrose 50% Injectable 25 Gram(s) IV Push once  dextrose 50% Injectable 12.5 Gram(s) IV Push once  dextrose 50% Injectable 25 Gram(s) IV Push once  diltiazem    milliGRAM(s) Oral daily  insulin lispro (ADMELOG) corrective regimen sliding scale   SubCutaneous three times a day before meals  insulin lispro (ADMELOG) corrective regimen sliding scale   SubCutaneous at bedtime  lidocaine   4% Patch 1 Patch Transdermal daily  metoprolol succinate  milliGRAM(s) Oral daily  mupirocin 2% Ointment 1 Application(s) Both Nostrils two times a day  Nephro-reena 1 Tablet(s) Oral daily  polyethylene glycol 3350 17 Gram(s) Oral two times a day  povidone iodine 10% Solution 1 Application(s) Topical daily  senna 2 Tablet(s) Oral at bedtime    PRN Inpatient Medications  bisacodyl 5 milliGRAM(s) Oral every 12 hours PRN  dextrose Oral Gel 15 Gram(s) Oral once PRN  sodium chloride 0.9% lock flush 10 milliLiter(s) IV Push every 1 hour PRN      REVIEW OF SYSTEMS  --------------------------------------------------------------------------------        VITALS/PHYSICAL EXAM  --------------------------------------------------------------------------------  T(C): 36.3 (05-27-22 @ 20:50), Max: 36.7 (05-26-22 @ 21:04)  HR: 78 (05-27-22 @ 20:50) (78 - 90)  BP: 151/67 (05-27-22 @ 20:50) (151/67 - 164/68)  RR: 18 (05-27-22 @ 20:50) (17 - 18)  SpO2: 91% (05-27-22 @ 20:50) (90% - 97%)  Wt(kg): --        05-26-22 @ 07:01  -  05-27-22 @ 07:00  --------------------------------------------------------  IN: 900 mL / OUT: 1900 mL / NET: -1000 mL      Physical Exam:  	     Gen:  confused, awake   	Pulm: Decreased breath sounds b/l bases.  	CV: No JVD. IRR  	Abd: +BS, soft, nontender, softly distended, obese               Extremity no edema              Extremity LE: + hyperpigmented bilateral LE with no edema              Vascular: R IJ HD catheter, L arm AVF       LABS/STUDIES  --------------------------------------------------------------------------------              8.1    10.23 >-----------<  315      [05-27-22 @ 07:10]              28.6     139  |  98  |  16  ----------------------------<  144      [05-27-22 @ 06:59]  4.0   |  27  |  4.48        Ca     8.8     [05-27-22 @ 06:59]      PT/INR: PT 23.0 , INR 1.97       [05-27-22 @ 07:07]  PTT: 32.5       [05-27-22 @ 07:07]      Creatinine Trend:  SCr 4.48 [05-27 @ 06:59]  SCr 5.44 [05-26 @ 07:15]  SCr 4.33 [05-25 @ 11:51]  SCr 6.84 [05-24 @ 14:22]  SCr 5.94 [05-21 @ 07:23]                  Iron 21, TIBC 245, %sat 9      [05-17-22 @ 05:54]  Ferritin 120      [05-17-22 @ 05:54]  PTH -- (Ca 8.5)      [05-24-22 @ 14:22]   84  PTH -- (Ca 8.3)      [04-15-22 @ 12:18]   150  PTH -- (Ca --)      [04-03-22 @ 23:06]   97  HbA1c 11.7      [11-07-19 @ 09:14]  TSH 1.71      [05-12-22 @ 07:11]

## 2022-05-27 NOTE — PROGRESS NOTE ADULT - PROBLEM SELECTOR PLAN 1
Likely 2nd to b/l pl effusions, atelectasis  -Suspect fluid overload given elevated proBNP, LE edema on admission   -Keep O>I with HD as tolerated   -Pt with hx of hydroPTX. CT chest in 2/2022 with pleural thickening, atelectasis. Findings at the time suspected to be chronic. CT A/P 4/2 with small b/l pl effusions L>R, pleural thickening  -CT chest now with small b/l pl effusion R>L, bibasilar atelectasis  -Keep sats >90% with supplemental O2 as needed (currently RA)  -Still appears mildly labored. CT chest non contrast ordered.

## 2022-05-27 NOTE — PROGRESS NOTE ADULT - SUBJECTIVE AND OBJECTIVE BOX
DATE OF SERVICE: 05-27-22 @ 13:29  CHIEF COMPLAINT:Patient is a 67y old  Male who presents with a chief complaint of Decreased urine output for the past week, leg oedema (27 May 2022 09:30)    	        PAST MEDICAL & SURGICAL HISTORY:  HTN (hypertension), benign      HLD (hyperlipidemia)      DM type 2 (diabetes mellitus, type 2)      TIA (transient ischemic attack)      Atrial fibrillation      MI (myocardial infarction)  circa 2014 and 2022.      CAD (coronary artery disease)      Neuropathy      Stage 3 chronic kidney disease      2019 novel coronavirus disease (COVID-19)  12/2021.  Received the COVID-19 vaccine x 2 as of April 2022.      Status post angioplasty with stent  LULU x 3 2/7/2014      S/P carotid endarterectomy  left              no cp or sob  no n/v  no abd pain    Medications:  MEDICATIONS  (STANDING):  allopurinol 100 milliGRAM(s) Oral daily  apixaban 5 milliGRAM(s) Oral two times a day  aspirin  chewable 81 milliGRAM(s) Oral daily  atorvastatin 40 milliGRAM(s) Oral at bedtime  chlorhexidine 4% Liquid 1 Application(s) Topical <User Schedule>  dextrose 50% Injectable 25 Gram(s) IV Push once  dextrose 50% Injectable 25 Gram(s) IV Push once  dextrose 50% Injectable 12.5 Gram(s) IV Push once  diltiazem    milliGRAM(s) Oral daily  insulin lispro (ADMELOG) corrective regimen sliding scale   SubCutaneous three times a day before meals  insulin lispro (ADMELOG) corrective regimen sliding scale   SubCutaneous at bedtime  lidocaine   4% Patch 1 Patch Transdermal daily  metoprolol succinate  milliGRAM(s) Oral daily  multivitamin/minerals 1 Tablet(s) Oral daily  mupirocin 2% Ointment 1 Application(s) Both Nostrils two times a day  polyethylene glycol 3350 17 Gram(s) Oral two times a day  povidone iodine 10% Solution 1 Application(s) Topical daily  senna 2 Tablet(s) Oral at bedtime    MEDICATIONS  (PRN):  bisacodyl 5 milliGRAM(s) Oral every 12 hours PRN Constipation  dextrose Oral Gel 15 Gram(s) Oral once PRN Blood Glucose LESS THAN 70 milliGRAM(s)/deciliter  sodium chloride 0.9% lock flush 10 milliLiter(s) IV Push every 1 hour PRN Pre/post blood products, medications, blood draw, and to maintain line patency    	    PHYSICAL EXAM:  T(C): 36.4 (05-27-22 @ 11:49), Max: 36.7 (05-26-22 @ 21:04)  HR: 83 (05-27-22 @ 11:49) (82 - 90)  BP: 164/68 (05-27-22 @ 11:49) (128/79 - 164/68)  RR: 17 (05-27-22 @ 11:49) (17 - 17)  SpO2: 90% (05-27-22 @ 11:49) (90% - 97%)  Wt(kg): --  I&O's Summary    26 May 2022 07:01  -  27 May 2022 07:00  --------------------------------------------------------  IN: 900 mL / OUT: 1900 mL / NET: -1000 mL        Appearance: Normal	  HEENT:   Normal oral mucosa,   Cardiovascular: Normal S1 S2, No JVD, No murmurs  Respiratory:dec bs   Gastrointestinal:  Soft, Non-tender, + BS	    Neurologic: No new changes    TELEMETRY: 	    ECG:  	  RADIOLOGY:  OTHER: 	  	  LABS:	 	    CARDIAC MARKERS:                                8.1    10.23 )-----------( 315      ( 27 May 2022 07:10 )             28.6     05-27    139  |  98  |  16  ----------------------------<  144<H>  4.0   |  27  |  4.48<H>    Ca    8.8      27 May 2022 06:59      proBNP:   Lipid Profile:   HgA1c:   TSH:

## 2022-05-27 NOTE — PROGRESS NOTE ADULT - ASSESSMENT
67 year old gentleman with PMH of CAD, NSTEMI s/p 3 stents in 2014 (stents to LAD, proximal and distal LCx), ischemic cardiomyopathy, HTN for 10 years, T2DM for 26 years c/b peripheral neuropathy, CVA, TIA, Afib on Pradaxa, chronic RLE wound, L carotid stenosis s/p endarterectomy (2014) just recently admitted  to the [facility] on 2/19/2022 with acute onset chest pain for one day found to have an NSTEMI, CAD, s/p PCI in setting of increased AF rates off bb for 3 days and resolved chest pain, his CKMB peaked and Echo noted with EF 60%,normal LV function, mild TR, mild NM. He was s/p [facility] with 85% proximal LAD s/p balloon angioplasty and LULU. He presented to [facility] ED with decreased urine output over the week. [facility]. Stevens went to [facility] for hematuria and was started  on Nitrofurantoin. Pt is still taking Nitrofurantoin w/ 5 days left. He came to the ED due to worsening symptoms. Pt reports having a similar incident 4-5 years ago that resolved spontaneously. He reports increased leg edema Dyspnea worse on exertion. his baseline Serum creatinine is in the 2.0 to 2.3 mg/dl range. ANT with serum creatinine of 8.29 with bun 144 and k 5.5      1- ESRD likely   2- chf chronic   3- hyperphosphatemia /shpt   4- anemia   5- hyperlipidemia   6- HTN /a fib  7- TIA hx   8- hx of carotid stenosis      HD am   renvela on hold, phos normal range  cardizem increased per cards  continue metoprolol  calorie count  anemia - epogen 23189 Units TIW with HD.    venofer 100 mg IVP with HD  trend hgb

## 2022-05-27 NOTE — PROGRESS NOTE ADULT - SUBJECTIVE AND OBJECTIVE BOX
Follow-up Pulm Progress Note    No new respiratory events overnight  Remains lethargic     Medications:  MEDICATIONS  (STANDING):  allopurinol 100 milliGRAM(s) Oral daily  apixaban 5 milliGRAM(s) Oral two times a day  aspirin  chewable 81 milliGRAM(s) Oral daily  atorvastatin 40 milliGRAM(s) Oral at bedtime  chlorhexidine 4% Liquid 1 Application(s) Topical <User Schedule>  dextrose 50% Injectable 25 Gram(s) IV Push once  dextrose 50% Injectable 25 Gram(s) IV Push once  dextrose 50% Injectable 12.5 Gram(s) IV Push once  diltiazem    milliGRAM(s) Oral daily  insulin lispro (ADMELOG) corrective regimen sliding scale   SubCutaneous three times a day before meals  insulin lispro (ADMELOG) corrective regimen sliding scale   SubCutaneous at bedtime  lidocaine   4% Patch 1 Patch Transdermal daily  metoprolol succinate  milliGRAM(s) Oral daily  multivitamin/minerals 1 Tablet(s) Oral daily  mupirocin 2% Ointment 1 Application(s) Both Nostrils two times a day  polyethylene glycol 3350 17 Gram(s) Oral two times a day  povidone iodine 10% Solution 1 Application(s) Topical daily  senna 2 Tablet(s) Oral at bedtime    MEDICATIONS  (PRN):  bisacodyl 5 milliGRAM(s) Oral every 12 hours PRN Constipation  dextrose Oral Gel 15 Gram(s) Oral once PRN Blood Glucose LESS THAN 70 milliGRAM(s)/deciliter  sodium chloride 0.9% lock flush 10 milliLiter(s) IV Push every 1 hour PRN Pre/post blood products, medications, blood draw, and to maintain line patency          Vital Signs Last 24 Hrs  T(C): 36.4 (27 May 2022 11:49), Max: 36.7 (26 May 2022 21:04)  T(F): 97.5 (27 May 2022 11:49), Max: 98 (26 May 2022 21:04)  HR: 83 (27 May 2022 11:49) (82 - 90)  BP: 164/68 (27 May 2022 11:49) (128/79 - 164/68)  BP(mean): --  RR: 17 (27 May 2022 11:49) (17 - 17)  SpO2: 90% (27 May 2022 11:49) (90% - 97%)          05-26 @ 07:01  -  05-27 @ 07:00  --------------------------------------------------------  IN: 900 mL / OUT: 1900 mL / NET: -1000 mL          LABS:                        8.1    10.23 )-----------( 315      ( 27 May 2022 07:10 )             28.6     05-27    139  |  98  |  16  ----------------------------<  144<H>  4.0   |  27  |  4.48<H>    Ca    8.8      27 May 2022 06:59            CAPILLARY BLOOD GLUCOSE      POCT Blood Glucose.: 147 mg/dL (27 May 2022 14:14)    PT/INR - ( 27 May 2022 07:07 )   PT: 23.0 sec;   INR: 1.97 ratio         PTT - ( 27 May 2022 07:07 )  PTT:32.5 sec          Physical Examination:  PULM: Clear to auscultation bilaterally, no significant sputum production  CVS: S1, S2 heard    RADIOLOGY REVIEWED  CXR: L pl effusion  ?RLL opacity

## 2022-05-27 NOTE — CHART NOTE - NSCHARTNOTEFT_GEN_A_CORE
Spoke with Neurosurgery service this AM regarding stent placement that was planned for today. Pt was made NPO at midnight and labs drawn in preparation. Per call with Neurosurgery in the AM, they recommended to keep pt NPO and neurosurgery attending to speak with pt's family regarding the procedure. Neurosurgery spoke with pt's wife and decision was made by Neurosurgery to not do procedure today. NPO order discontinued. Discussed with Dr. Corral.    Milli Cornelius PA-C  [facility]  [id number] Spoke with Neurosurgery service this AM regarding stent placement that was planned for today. Pt was made NPO at midnight and labs drawn in preparation. Per call with Neurosurgery Dr. Altamirano in the AM, they recommended to keep pt NPO and neurosurgery attending to speak with pt's family regarding the procedure. Neurosurgery then spoke with pt's daughter and decision was made by Neurosurgery to not do procedure today. NPO order discontinued. Discussed with Dr. Corral.    SUSAN Gore-C  [facility]  [id number]

## 2022-05-27 NOTE — PROGRESS NOTE ADULT - SUBJECTIVE AND OBJECTIVE BOX
Left ICA occlusion  Right ICA stenosis 70%  Bilateral embolic strokes  Encephalopathic  AF on Eliquis  We discussed indications, risks, benefits and alternatives for Right carotid stenosis  I recommended Right Carotid artery stenting  Family in agreement with the plan    PLAN  Right Carotid Stenting Wed June 1st  Start Plavix 75 mg daily on 5/28  Cardiac Clearance for General Anesthesia

## 2022-05-27 NOTE — PROGRESS NOTE ADULT - SUBJECTIVE AND OBJECTIVE BOX
CARDIOLOGY FOLLOW UP - Dr. Mazariegos  DATE OF SERVICE: 5/27/22     CC no cp or sob        REVIEW OF SYSTEMS:  CONSTITUTIONAL: No fever, weight loss, or fatigue  RESPIRATORY: No cough, wheezing, chills or hemoptysis; No Shortness of Breath  CARDIOVASCULAR: No chest pain, palpitations, passing out, dizziness, or leg swelling  GASTROINTESTINAL: No abdominal or epigastric pain. No nausea, vomiting, or hematemesis; No diarrhea or constipation. No melena or hematochezia.      PHYSICAL EXAM:  T(C): 36.6 (05-27-22 @ 05:29), Max: 36.7 (05-26-22 @ 12:53)  HR: 90 (05-27-22 @ 05:29) (82 - 98)  BP: 152/64 (05-27-22 @ 05:29) (125/62 - 153/78)  RR: 17 (05-27-22 @ 05:29) (17 - 18)  SpO2: 95% (05-27-22 @ 05:29) (95% - 97%)  Wt(kg): --  I&O's Summary    26 May 2022 07:01  -  27 May 2022 07:00  --------------------------------------------------------  IN: 900 mL / OUT: 1900 mL / NET: -1000 mL        Appearance: Normal	  Cardiovascular: Normal S1 S2, irreg   Respiratory: Lungs clear to auscultation	  Gastrointestinal:  Soft, Non-tender, + BS	  Extremities: Normal range of motion, No clubbing, cyanosis or edema      Home Medications:  allopurinol 100 mg oral tablet: 1 tab(s) orally once a day (03 Apr 2022 06:34)  aspirin 81 mg oral delayed release tablet: 1 tab(s) orally once a day (03 Apr 2022 06:34)  bumetanide 2 mg oral tablet: 1 tab(s) orally once a day (03 Apr 2022 06:34)  insulin glargine 100 units/mL subcutaneous solution: 25 unit(s) subcutaneous once a day (at bedtime) (03 Apr 2022 06:34)  metOLazone 5 mg oral tablet: 1 tab(s) orally 3 times a week (03 Apr 2022 06:34)  metoprolol succinate 50 mg oral tablet, extended release: 2 tab(s) orally once a day (03 Apr 2022 06:34)  NovoLOG 100 units/mL subcutaneous solution: subcutaneous 4 times a day (before meals and at bedtime) (03 Apr 2022 06:34)  NovoLOG FlexPen 100 units/mL injectable solution: 10 unit(s) subcutaneous 3 times a day (03 Apr 2022 06:34)  oxycodone-acetaminophen 5 mg-325 mg oral tablet: 1 tab(s) orally 2 times a day (03 Apr 2022 06:34)  Pradaxa 150 mg oral capsule: 1 cap(s) orally 2 times a day (03 Apr 2022 06:34)  pregabalin 100 mg oral capsule: 1 cap(s) orally 3 times a day (03 Apr 2022 06:34)      MEDICATIONS  (STANDING):  allopurinol 100 milliGRAM(s) Oral daily  apixaban 5 milliGRAM(s) Oral two times a day  aspirin  chewable 81 milliGRAM(s) Oral daily  atorvastatin 40 milliGRAM(s) Oral at bedtime  chlorhexidine 4% Liquid 1 Application(s) Topical <User Schedule>  dextrose 50% Injectable 25 Gram(s) IV Push once  dextrose 50% Injectable 25 Gram(s) IV Push once  dextrose 50% Injectable 12.5 Gram(s) IV Push once  diltiazem    milliGRAM(s) Oral daily  insulin lispro (ADMELOG) corrective regimen sliding scale   SubCutaneous three times a day before meals  insulin lispro (ADMELOG) corrective regimen sliding scale   SubCutaneous at bedtime  lidocaine   4% Patch 1 Patch Transdermal daily  metoprolol succinate  milliGRAM(s) Oral daily  multivitamin/minerals 1 Tablet(s) Oral daily  mupirocin 2% Ointment 1 Application(s) Both Nostrils two times a day  polyethylene glycol 3350 17 Gram(s) Oral two times a day  povidone iodine 10% Solution 1 Application(s) Topical daily  senna 2 Tablet(s) Oral at bedtime      TELEMETRY: afib hr 70 	    ECG:  	  RADIOLOGY:   DIAGNOSTIC TESTING:  [ ] Echocardiogram:  [ ]  Catheterization:  [ ] Stress Test:    OTHER: 	    LABS:	 	                            8.1    10.23 )-----------( 315      ( 27 May 2022 07:10 )             28.6     05-27    139  |  98  |  16  ----------------------------<  144<H>  4.0   |  27  |  4.48<H>    Ca    8.8      27 May 2022 06:59      PT/INR - ( 27 May 2022 07:07 )   PT: 23.0 sec;   INR: 1.97 ratio         PTT - ( 27 May 2022 07:07 )  PTT:32.5 sec

## 2022-05-28 NOTE — PROGRESS NOTE ADULT - SUBJECTIVE AND OBJECTIVE BOX
Subjective:  no new neuro issues, seen by IVETH    Medications:  allopurinol 100 milliGRAM(s) Oral daily  apixaban 5 milliGRAM(s) Oral two times a day  aspirin  chewable 81 milliGRAM(s) Oral daily  atorvastatin 40 milliGRAM(s) Oral at bedtime  bisacodyl 5 milliGRAM(s) Oral every 12 hours PRN  chlorhexidine 4% Liquid 1 Application(s) Topical <User Schedule>  clopidogrel Tablet 75 milliGRAM(s) Oral daily  dextrose 50% Injectable 25 Gram(s) IV Push once  dextrose 50% Injectable 25 Gram(s) IV Push once  dextrose 50% Injectable 12.5 Gram(s) IV Push once  dextrose Oral Gel 15 Gram(s) Oral once PRN  diltiazem    milliGRAM(s) Oral daily  insulin lispro (ADMELOG) corrective regimen sliding scale   SubCutaneous three times a day before meals  insulin lispro (ADMELOG) corrective regimen sliding scale   SubCutaneous at bedtime  lidocaine   4% Patch 1 Patch Transdermal daily  metoprolol succinate  milliGRAM(s) Oral daily  mupirocin 2% Ointment 1 Application(s) Both Nostrils two times a day  Nephro-reena 1 Tablet(s) Oral daily  ofloxacin 0.3% Solution 1 Drop(s) Right EYE four times a day  polyethylene glycol 3350 17 Gram(s) Oral two times a day  povidone iodine 10% Solution 1 Application(s) Topical daily  senna 2 Tablet(s) Oral at bedtime  sodium chloride 0.9% lock flush 10 milliLiter(s) IV Push every 1 hour PRN      Labs:  CBC Full  -  ( 28 May 2022 13:06 )  WBC Count : 11.21 K/uL  RBC Count : 3.19 M/uL  Hemoglobin : 7.7 g/dL  Hematocrit : 26.9 %  Platelet Count - Automated : 353 K/uL  Mean Cell Volume : 84.3 fl  Mean Cell Hemoglobin : 24.1 pg  Mean Cell Hemoglobin Concentration : 28.6 gm/dL  Auto Neutrophil # : x  Auto Lymphocyte # : x  Auto Monocyte # : x  Auto Eosinophil # : x  Auto Basophil # : x  Auto Neutrophil % : x  Auto Lymphocyte % : x  Auto Monocyte % : x  Auto Eosinophil % : x  Auto Basophil % : x    05-28    137  |  98  |  26<H>  ----------------------------<  167<H>  3.7   |  27  |  5.94<H>    Ca    8.5      28 May 2022 13:08  Phos  4.0     05-28      CAPILLARY BLOOD GLUCOSE      POCT Blood Glucose.: 122 mg/dL (28 May 2022 17:18)  POCT Blood Glucose.: 183 mg/dL (28 May 2022 12:26)  POCT Blood Glucose.: 147 mg/dL (28 May 2022 08:26)  POCT Blood Glucose.: 162 mg/dL (27 May 2022 21:18)      PT/INR - ( 27 May 2022 07:07 )   PT: 23.0 sec;   INR: 1.97 ratio         PTT - ( 27 May 2022 07:07 )  PTT:32.5 sec        Vitals:  Vital Signs Last 24 Hrs  T(C): 36.4 (28 May 2022 16:45), Max: 36.6 (28 May 2022 11:55)  T(F): 97.5 (28 May 2022 16:45), Max: 97.9 (28 May 2022 11:55)  HR: 98 (28 May 2022 16:45) (78 - 98)  BP: 149/66 (28 May 2022 16:45) (149/66 - 169/69)  BP(mean): --  RR: 18 (28 May 2022 16:45) (18 - 20)  SpO2: 95% (28 May 2022 16:45) (91% - 95%)  NEUROLOGICAL EXAM:    Mental status: awake confused, tired, no active distress.  He is oriented to person,  in hospital He intermittently follows very simple commands and names very simple objects.        Cranial Nerves: Pupils were equal, round, reactive to light. Extraocular movements were intact. B BTT Facial sensation was intact to light touch. There was no facial asymmetry. The palate was upgoing symmetrically and tongue was midline. Shoulder shrug was full bilaterally    Motor exam: Bulk and tone were normal. moves all ext antigravity but drifts to bed.  There is relative right leg weakness in setting of pain which has been previously present.  Fine finger movements were limited by mental status.  There was no pronator drift    Reflexes: DTRs depressed, flexor plantars.     Sensation: Intact to noxious, seems intact to light touch, due to decreased attention did not assess other modalities     Coordination: no gross dysmetria    Gait: deferred    NIHSS: 12    Imaging:    ACC: [id number] EXAM:  CT BRAIN                        PROCEDURE DATE:  04/09/2022      INTERPRETATION:  CLINICAL INFORMATION:  Altered mental status, on   anticoagulation .    TECHNIQUE: Multiple contiguous axial images were acquired from the   skullbase to the vertex without the administration of intravenous   contrast.  Coronal and sagittal reformations were made.    COMPARISON: CT head 10/21/2021, brain MRI 02/23/2014.    FINDINGS:    Scattered lacunar infarcts in the bilateral cerebralhemispheres are   grossly stable compared to the 10/21/2021 head CT. No new additional   infarcts are noted over the time interval.    No acute intracranial hemorrhage, mass effect, or midline shift. The   brain demonstrates periventricular hypoattenuation, nonspecific in   etiology but likely representing chronic microvascular ischemic changes.    No abnormal extra-axial fluid collections are present.    The ventricles and sulci demonstrate age appropriate involutional   changes.  The basal cisterns are patent.    The orbits are unremarkable. The paranasal sinuses are clear. The mastoid   air cells are clear. The calvarium is intact.    IMPRESSION:    No acute intracranial abnormality is noted. If the patient has new and   persistent symptoms, consider short interval follow-up head CT or brain   MRI.    --- End of Report ---    GATITO VILLARREAL MD; Resident Radiologist  This document has been electronically signed.  JESSE CORONA MD; Attending Radiologist  This document has been electronically signed. Apr 9 2022  2:00PM        ACC: [id number] EXAM:  MR BRAIN                          PROCEDURE DATE:  05/11/2022          INTERPRETATION:  MR brain  without gadolinium    CLINICAL INFORMATION: Stroke.    TECHNIQUE: Limited Sagittal T1-weighted images, axial FLAIR images, and   axial diffusion weighted images of the brain were obtained.  Patient was   unable to hold still for remaining sequences.    FINDINGS:   MRI dated 02/23/2014 available for review.    The brain demonstrates small acute infarctions in the BILATERAL posterior   centrum semiovale and LEFT corona radiata and LEFT posterior   periventricular white matter with restricted diffusion. No intracranial   hemorrhage is recognized. No mass effect is found in the brain.    The ventricles, sulci and basal cisterns show mild global atrophy most   prominent within the BILATERAL cerebellum.    The vertebral and internal carotid arteries demonstrate expected flow   voids indicating their patency.    The orbits are unremarkable.  The paranasal sinuses are clear.  The nasal   cavity appears intact.  The nasopharynx is symmetric.  The central skull   base and temporal bones are intact.  The calvarium appears unremarkable.    IMPRESSION: Small acute infarctions in the BILATERAL posterior centrum   semiovale and LEFT corona radiata and LEFT posterior periventricular   white matter with restricted diffusion.   mild global atrophy most   prominent within the BILATERAL cerebellum.    --- End of Report ---            JESÚS PADGETT MD; Attending Radiologist  This document has been electronically signed. May 11 2022  5:47PM        Patient name: DYLAN DEL CASTILLO  YOB: 1955   Age: 67 (M)   MR#: [id number]  Study Date: 2/22/2022  [facility]  Study quality: Technically difficult  Referring Physician: Mendoza Corral MD  Blood Pressure: 152/82 mmHg  Height: 180 cm  Weight: 136 kg  BSA: 2.5 m2  ------------------------------------------------------------------------  PROCEDURE: Transthoracic echocardiogram with 2-D, M-Mode  and complete spectral and color flow Doppler. Verbal  consent was obtained for injection of  Ultrasonic Enhancing  Agent following a discussion of risks and benefits.  Following intravenous injection of Ultrasonic Enhancing  Agent , harmonic imaging was performed.  INDICATION: Chest pain, unspecified (R07.9)  ------------------------------------------------------------------------  Dimensions:    Normal Values:  LA:     4.4    2.0 - 4.0 cm  Ao:     4.0    2.0 - 3.8 cm  SEPTUM: 1.1    0.6 - 1.2 cm  PWT:    0.9    0.6 - 1.1 cm  LVIDd:  5.0    3.0 - 5.6 cm  LVIDs:  3.5    1.8 - 4.0 cm  Derived variables:  LVMI: 73 g/m2  RWT: 0.36  EF (Visual Estimate): 60 %  Doppler Peak Velocity (m/sec): AoV=1.5 TV=2.2  ------------------------------------------------------------------------  Observations:  Mitral Valve: Normal mitral valve.  Aortic Valve/Aorta: Calcified aortic valve with normal  opening. Minimal aortic regurgitation.  Normal aortic root size.  Left Atrium: Mildly dilated left atrium.  Left Ventricle: Endocardial visualization enhanced with  intravenous injection of Ultrasonic Enhancing Agent  (Definity).  Normal left ventricular internal dimensions and wall  thickness.  Normal left ventricular systolic function. No segmental  wall motion abnormalities.  Right Heart: Normal right atrium. Normal right ventricular  size and function.  Normal tricuspid valve. Mild tricuspid regurgitation.  Normal pulmonic valve. Mild pulmonic regurgitation.  Pericardium/Pleura: Normal pericardium with no pericardial  effusion.  Hemodynamic: No evidence of pulmonary hypertension.  ------------------------------------------------------------------------  Conclusions:  Endocardial visualization enhanced with intravenous  injection of Ultrasonic Enhancing Agent (Definity).  Normal left ventricular systolic function. No segmental  wall motion abnormalities.  ------------------------------------------------------------------------  Confirmed on  2/22/2022 - 16:17:39 by Ishan Ackerman MD, FASE  ------------------------------------------------------------------------    < from: CT Angio Head w/ IV Cont (05.12.22 @ 13:34) >    ACC: [id number] EXAM:  CT ANGIO NECK (W)AW IC                        ACC: [id number] EXAM:  CT ANGIO BRAIN (W)AW IC                          PROCEDURE DATE:  05/12/2022          INTERPRETATION:  CT angiography of the brain and neck    CLINICAL INDICATION: Recent infarcts. Carotid stenosis.    TECHNIQUE: Direct axial CT scanning of the brain and neck was obtained   from the vertex to the level of the clavicular heads after the dynamic   intravenous injection of 44 cc of Omnipaque 300. 0 cc were discarded.   Sagittal and coronal maximum intensity projection reformats were   provided.  Three-dimensional reconstructions were performed by the   radiologist using the [employer] workstation.    Additionally, noncontrast axial CT scanning of the brain was obtained   from the skull base to the vertex. Sagittal and coronal reformats were   provided.    COMPARISON: MRA neck 10/24/2021    FINDINGS:    CT brain:    Previously seen acute infarcts in the bilateral cerebral hemispheres are   redemonstrated. No CT evidence for hemorrhagic transformation.    No hydrocephalus, midline shift, or effacement of basal cisterns.    No acute intracranial hemorrhage or vasogenic edema.    Mild to moderate white matter microvascular ischemic disease.    CTA brain:    Limited by relatively decreased opacification of the arteries.    Multifocal narrowing of the right skull base ICA due to calcified plaque,   the degree of which is not well evaluated.    Normal flow-related enhancement of the supraclinoid right ICA.    Lack of flow related enhancement of the petrous, precavernous, and   cavernous left ICA. Reconstitution of the vessel at its supraclinoid   segment.    Otherwise no flow-limiting stenosis.    Normal flow-related enhancement of the bilateral anterior, middle, and   posterior cerebral arteries.    Normal flow-related enhancement of the intradural vertebral arteries and   basilar artery.    No vascular aneurysm or AVM.    CTA neck:    Stenoses described in this report are on the basis of NASCET criteria.    Study is significantly limited by relatively decreased opacification of   the arteries.    Short segment stenosis of the mid left common carotid artery due to the   presence of partially calcified plaque is poorly evaluated. Flow-related   enhancement of the more proximal and distal left common carotid artery is   noted.    Long segment stenosis of the mid and distal right common carotid artery   due to the presence of partially calcified plaque is poorly evaluated.    Rapidly progressive stenosis of the left internal carotid artery   beginning at its origin. No flow related enhancement of the vessel beyond   its origin.    Normal flow-related enhancement of the bilateral vertebral arteries.    No gross evidence for acute arterial dissection.    IMPRESSION:    CTA brain:  Limited by relatively decreased opacification of the arteries.    Multifocal narrowing of the right skull base ICA due to calcified plaque,   the degree of which is not well evaluated.    Lack of flow related enhancement of the petrous, precavernous, and   cavernous left ICA. Reconstitution of the vessel at its supraclinoid   segment.    Otherwise no flow-limiting stenosis.    No vascular aneurysm or AVM.    CTA neck:  Limited by relatively decreased opacification of the arteries.    Short segment stenosis of the mid left common carotid artery due to the   presence of partially calcified plaque is poorly evaluated. Flow-related   enhancement of the more proximal and distal left common carotid artery is   noted.    Long segment stenosis of the mid and distal right common carotid artery   due to the presence of partially calcified plaque is poorly evaluated.    Rapidly progressive stenosis of the left internal carotid artery   beginning at its origin. No flow related enhancement of the vessel beyond   its origin.    Recommend correlation with contrast-enhanced MRI of the neck for further   evaluation.    --- End of Report ---            MIGUEL ANGEL CARRILLO MD; Attending Radiologist  This document has been electronically signed. May 12 2022  2:47PM    < end of copied text >

## 2022-05-28 NOTE — PROGRESS NOTE ADULT - ASSESSMENT
·  Problem:ESRD.co hemodialysis per renal  to cont as per renal       Problem/Plan - 2:  ·  Problem: Chronic atrial fibrillation. c/w a/c  cards f/u      Problem/Plan - 3:  ·  Problem: Cystitis.   ID follow up appreciated        Problem/Plan - 4:  ·  Problem: Type 2 diabetes mellitus. c/w insulin   endo f/u      Problem/Plan - 5:  ·  Problem: CAD (coronary artery disease).   rapid a fib better  meds adjusted  hr stable  c/w a/c       Persistent encephalopathy   neuro follow up appreciated  f/u labs   mental status better  polypharmacy contributing and volume status   minimizing meds     carotid duplex noted  cta noted  mra/ mri noted  tx plans as per vasc / neuro   neuro f/u noted  neuro sx cancelled   wed / spoke w/ neuro     pl effusion   cxr monday and possible tap tuesday if no better

## 2022-05-28 NOTE — PROGRESS NOTE ADULT - SUBJECTIVE AND OBJECTIVE BOX
CARDIOLOGY FOLLOW UP NOTE - DR. SAGASTUME    Patient Name: DYLAN DEL CASTILLO  Date of Service: 05-28-22     Patient seen and examined  wife at bedside    Subjective:    cv: denies chest pain, dyspnea, palpitations, dizziness  pulmonary: denies cough  GI: denies abdominal pain, nausea, vomiting  vascular/legs: no edema   skin: no rash  ROS: otherwise negative   overnight events:      PHYSICAL EXAM:  T(C): 36.4 (05-28-22 @ 06:10), Max: 36.4 (05-27-22 @ 11:49)  HR: 92 (05-28-22 @ 06:10) (78 - 92)  BP: 155/71 (05-28-22 @ 06:10) (151/67 - 164/68)  RR: 18 (05-28-22 @ 06:10) (17 - 18)  SpO2: 95% (05-28-22 @ 06:10) (90% - 95%)  Wt(kg): --  I&O's Summary    Daily     Daily     Appearance: Normal	aao x1-2  Cardiovascular: Normal S1 S2,RRR, No JVD, No murmurs  Respiratory: Lungs clear to auscultation	  Gastrointestinal:  Soft, Non-tender, + BS	  Extremities: Normal range of motion, No clubbing, cyanosis or edema      Home Medications:  allopurinol 100 mg oral tablet: 1 tab(s) orally once a day (03 Apr 2022 06:34)  aspirin 81 mg oral delayed release tablet: 1 tab(s) orally once a day (03 Apr 2022 06:34)  bumetanide 2 mg oral tablet: 1 tab(s) orally once a day (03 Apr 2022 06:34)  insulin glargine 100 units/mL subcutaneous solution: 25 unit(s) subcutaneous once a day (at bedtime) (03 Apr 2022 06:34)  metOLazone 5 mg oral tablet: 1 tab(s) orally 3 times a week (03 Apr 2022 06:34)  metoprolol succinate 50 mg oral tablet, extended release: 2 tab(s) orally once a day (03 Apr 2022 06:34)  NovoLOG 100 units/mL subcutaneous solution: subcutaneous 4 times a day (before meals and at bedtime) (03 Apr 2022 06:34)  NovoLOG FlexPen 100 units/mL injectable solution: 10 unit(s) subcutaneous 3 times a day (03 Apr 2022 06:34)  oxycodone-acetaminophen 5 mg-325 mg oral tablet: 1 tab(s) orally 2 times a day (03 Apr 2022 06:34)  Pradaxa 150 mg oral capsule: 1 cap(s) orally 2 times a day (03 Apr 2022 06:34)  pregabalin 100 mg oral capsule: 1 cap(s) orally 3 times a day (03 Apr 2022 06:34)      MEDICATIONS  (STANDING):  allopurinol 100 milliGRAM(s) Oral daily  apixaban 5 milliGRAM(s) Oral two times a day  aspirin  chewable 81 milliGRAM(s) Oral daily  atorvastatin 40 milliGRAM(s) Oral at bedtime  chlorhexidine 4% Liquid 1 Application(s) Topical <User Schedule>  clopidogrel Tablet 75 milliGRAM(s) Oral daily  dextrose 50% Injectable 25 Gram(s) IV Push once  dextrose 50% Injectable 25 Gram(s) IV Push once  dextrose 50% Injectable 12.5 Gram(s) IV Push once  diltiazem    milliGRAM(s) Oral daily  epoetin naz-epbx (RETACRIT) Injectable 14034 Unit(s) IV Push once  insulin lispro (ADMELOG) corrective regimen sliding scale   SubCutaneous three times a day before meals  insulin lispro (ADMELOG) corrective regimen sliding scale   SubCutaneous at bedtime  iron sucrose Injectable 100 milliGRAM(s) IV Push once  lidocaine   4% Patch 1 Patch Transdermal daily  metoprolol succinate  milliGRAM(s) Oral daily  mupirocin 2% Ointment 1 Application(s) Both Nostrils two times a day  Nephro-reena 1 Tablet(s) Oral daily  polyethylene glycol 3350 17 Gram(s) Oral two times a day  povidone iodine 10% Solution 1 Application(s) Topical daily  senna 2 Tablet(s) Oral at bedtime      TELEMETRY: 	    ECG:  	  RADIOLOGY:   DIAGNOSTIC TESTING:  [ ] Echocardiogram:  [ ] Catheterization:  [ ] Stress Test:    OTHER: 	    LABS:	 	    CARDIAC MARKERS:                                      8.1    10.23 )-----------( 315      ( 27 May 2022 07:10 )             28.6     05-27    139  |  98  |  16  ----------------------------<  144<H>  4.0   |  27  |  4.48<H>    Ca    8.8      27 May 2022 06:59      proBNP:   PT/INR - ( 27 May 2022 07:07 )   PT: 23.0 sec;   INR: 1.97 ratio         PTT - ( 27 May 2022 07:07 )  PTT:32.5 sec  Lipid Profile:   HgA1c:     Creatinine, Serum: 4.48 mg/dL (05-27-22 @ 06:59)  Creatinine, Serum: 5.44 mg/dL (05-26-22 @ 07:15)  Creatinine, Serum: 4.33 mg/dL (05-25-22 @ 11:51)

## 2022-05-28 NOTE — PROGRESS NOTE ADULT - ASSESSMENT
67 year old gentleman with PMH of CAD, NSTEMI s/p 3 stents in 2014 (stents to LAD, proximal and distal LCx), ischemic cardiomyopathy, HTN for 10 years, T2DM for 26 years c/b peripheral neuropathy, CVA, TIA, Afib on Pradaxa, chronic RLE wound, L carotid stenosis s/p endarterectomy (2014) just recently admitted  to the [facility] on 2/19/2022 with acute onset chest pain for one day found to have an NSTEMI, CAD, s/p PCI in setting of increased AF rates off bb for 3 days and resolved chest pain, his CKMB peaked and Echo noted with EF 60%,normal LV function, mild TR, mild CO. He was s/p [facility] with 85% proximal LAD s/p balloon angioplasty and LULU. He presented to [facility] ED with decreased urine output over the week. Mr. Stevens went to [facility] for hematuria and was started  on Nitrofurantoin. Pt is still taking Nitrofurantoin w/ 5 days left. He came to the ED due to worsening symptoms. Pt reports having a similar incident 4-5 years ago that resolved spontaneously. He reports increased leg edema Dyspnea worse on exertion. his baseline Serum creatinine is in the 2.0 to 2.3 mg/dl range. ANT with serum creatinine of 8.29 with bun 144 and k 5.5      1- ESRD likely   2- chf chronic   3- hyperphosphatemia /shpt   4- anemia   5- hyperlipidemia   6- HTN /a fib  7- TIA hx   8- hx of carotid stenosis      hd 225 min 3 k bath bfr 400 dfr 600 1 liter   continue metoprolol  calorie count  anemia - epogen 00191 Units TIW with HD.    venofer 100 mg IVP with HD  trend hgb

## 2022-05-28 NOTE — PROGRESS NOTE ADULT - SUBJECTIVE AND OBJECTIVE BOX
Patient seen and examined at bedside.    --Anticoagulation--  clopidogrel Tablet 75 milliGRAM(s) Oral daily    T(C): 36.4 (05-28-22 @ 06:10), Max: 36.4 (05-27-22 @ 11:49)  HR: 92 (05-28-22 @ 06:10) (78 - 92)  BP: 155/71 (05-28-22 @ 06:10) (151/67 - 164/68)  RR: 18 (05-28-22 @ 06:10) (17 - 18)  SpO2: 95% (05-28-22 @ 06:10) (90% - 95%)  Wt(kg): --    Exam: AOx2, FC, ROA AG L>R    .  LABS:                         8.1    10.23 )-----------( 315      ( 27 May 2022 07:10 )             28.6     05-27    139  |  98  |  16  ----------------------------<  144<H>  4.0   |  27  |  4.48<H>    Ca    8.8      27 May 2022 06:59      PT/INR - ( 27 May 2022 07:07 )   PT: 23.0 sec;   INR: 1.97 ratio         PTT - ( 27 May 2022 07:07 )  PTT:32.5 sec          RADIOLOGY, EKG & ADDITIONAL TESTS: Reviewed.

## 2022-05-28 NOTE — PROGRESS NOTE ADULT - PROBLEM SELECTOR PLAN 1
Likely 2nd to b/l pl effusions, atelectasis  -Suspect fluid overload given elevated proBNP, LE edema on admission   -Keep O>I with HD as tolerated   -Pt with hx of hydroPTX. CT chest in 2/2022 with pleural thickening, atelectasis. Findings at the time suspected to be chronic. CT A/P 4/2 with small b/l pl effusions L>R, pleural thickening  -CT chest with small b/l pl effusion R>L, bibasilar atelectasis  -Still appears mildly labored. CT chest with no PNA, but increase in L pleural effusion. Suggest optimization of fluid removal with HD. Repeat CXR early next week, may need to consider drainage of effusion if not improving. D/w Dr. El.   -Keep sats >90% with supplemental O2 as needed (currently RA).

## 2022-05-28 NOTE — PROGRESS NOTE ADULT - SUBJECTIVE AND OBJECTIVE BOX
Follow-up Pulm Progress Note    O2 sats 91-92% on RA  Denies CP,SOB    Medications:  MEDICATIONS  (STANDING):  allopurinol 100 milliGRAM(s) Oral daily  apixaban 5 milliGRAM(s) Oral two times a day  aspirin  chewable 81 milliGRAM(s) Oral daily  atorvastatin 40 milliGRAM(s) Oral at bedtime  chlorhexidine 4% Liquid 1 Application(s) Topical <User Schedule>  clopidogrel Tablet 75 milliGRAM(s) Oral daily  dextrose 50% Injectable 25 Gram(s) IV Push once  dextrose 50% Injectable 25 Gram(s) IV Push once  dextrose 50% Injectable 12.5 Gram(s) IV Push once  diltiazem    milliGRAM(s) Oral daily  insulin lispro (ADMELOG) corrective regimen sliding scale   SubCutaneous three times a day before meals  insulin lispro (ADMELOG) corrective regimen sliding scale   SubCutaneous at bedtime  lidocaine   4% Patch 1 Patch Transdermal daily  metoprolol succinate  milliGRAM(s) Oral daily  mupirocin 2% Ointment 1 Application(s) Both Nostrils two times a day  Nephro-reena 1 Tablet(s) Oral daily  ofloxacin 0.3% Solution 1 Drop(s) Right EYE four times a day  polyethylene glycol 3350 17 Gram(s) Oral two times a day  povidone iodine 10% Solution 1 Application(s) Topical daily  senna 2 Tablet(s) Oral at bedtime    MEDICATIONS  (PRN):  bisacodyl 5 milliGRAM(s) Oral every 12 hours PRN Constipation  dextrose Oral Gel 15 Gram(s) Oral once PRN Blood Glucose LESS THAN 70 milliGRAM(s)/deciliter  sodium chloride 0.9% lock flush 10 milliLiter(s) IV Push every 1 hour PRN Pre/post blood products, medications, blood draw, and to maintain line patency      Vital Signs Last 24 Hrs  T(C): 36.3 (28 May 2022 12:57), Max: 36.6 (28 May 2022 11:55)  T(F): 97.3 (28 May 2022 12:57), Max: 97.9 (28 May 2022 11:55)  HR: 87 (28 May 2022 12:57) (78 - 94)  BP: 162/65 (28 May 2022 12:57) (151/67 - 169/69)  BP(mean): --  RR: 19 (28 May 2022 12:57) (18 - 20)  SpO2: 95% (28 May 2022 12:57) (91% - 95%)    LABS:                        7.7    11.21 )-----------( 353      ( 28 May 2022 13:06 )             26.9     05-28    137  |  98  |  26<H>  ----------------------------<  167<H>  3.7   |  27  |  5.94<H>    Ca    8.5      28 May 2022 13:08  Phos  4.0     05-28    CAPILLARY BLOOD GLUCOSE  POCT Blood Glucose.: 183 mg/dL (28 May 2022 12:26)    PT/INR - ( 27 May 2022 07:07 )   PT: 23.0 sec;   INR: 1.97 ratio      PTT - ( 27 May 2022 07:07 )  PTT:32.5 sec    CULTURES:     Physical Examination:  PULM: Decreased BS L>R  CVS: RRR    RADIOLOGY REVIEWED  CT chest: < from: CT Chest No Cont (05.27.22 @ 18:46) >    FINDINGS:    LUNGS/PLEURA/AIRWAYS: Patent central airways.  Moderate left pleural   effusion, increased from prior exam, associated with near complete   atelectasis of the left lower lobe. Small right pleural effusion with   adjacent subsegmental atelectasis. Right-sided pleural thickening not   significantly changed from prior exam. Bilateral calcified granulomas.  MEDIASTINUM AND STEPHON: No large mediastinal lymph nodes.  VESSELS: Atherosclerotic calcification of the aorta and its branches.   Right IJ central venous catheter with tip terminating at the superior   cavoatrial junction.  HEART: Cardiomegaly. No pericardial effusion.  CHEST WALL AND LOWER NECK: Within normal limits.  VISUALIZED UPPER ABDOMEN: Unchanged nonspecific bilateral perinephric   stranding.  BONES: Degenerative changes of the spine.    IMPRESSION:  Interval increase in left pleural effusion with near complete atelectasis   of the left lower lobe. Stable small right pleural effusion and pleural   thickening.    --- End of Report ---    < end of copied text >

## 2022-05-28 NOTE — PROGRESS NOTE ADULT - ASSESSMENT
67M, CVA w/o deficits s/p 2014 Left CEA w/ Vasc Surg Dr. Pinto, CAD, T2DM, AF. On ASA81, Eliquis. Admitted 4/3 for lethargy, found to have renal failure, now on HD. CTA on 5/12 shows Left ICA proximal occlusion with distal IC reconstitution. MRI shows b/l centrum semiovale, lacunar Left corona radiata infarcts. Carotid u/s also shows Right CCA >70% stenosis. Exam: Awake, no verbal outpt, Ox0 (occasionally Ox1-2 per nurse), intermittently FC/mimics, EOMI, PERRL, ROA spontaneously L>R  -Admitted to Medicine  -Preop for Angio, w/ possible R carotid  stent on Wednesday  -Needs documented medical clearance  -Needs Nephro clearance/coordination prior to Wednesday

## 2022-05-28 NOTE — PROGRESS NOTE ADULT - ASSESSMENT
Echo 5/13/22:  1. Normal left ventricular internal dimensions and wall  thicknesses.  2. Endocardial visualization enhanced with intravenous  injection of Ultrasonic Enhancing Agent (Lumason). Grossly  borderline normal left ventricular systolic function; no  obvious segmental wall motion abnormality.  3. The right ventricle is not well visualized.  *** Compared with echocardiogram of 2/22/2022, no  significant changes noted.      A/P    66 y/o M with history of CAD, s/p multiple PCI, with most recent 2/22 PCI of LAD in setting of NSTEMI, on ASA only (pradaxa), chronic atrial fibrillation maintained on Pradaxa, essential HTN, type 2 DM previously on oral medications but on insulin, diabetic neuropathy with chronic RIGHT LE venous stasis changes>left, LEFT foot ulcer seen by podiatry, past endarterectomy LEFT CEA in 2014 presenting with 1 week of decreased urine output, cystitis with hematuria     #ANT on CKD, new HD  -oliguric renal failure in setting of UTI/cystitis  -New HD for uremia, renal failure  -s/p L AVG 4/18, s/p permacath placement 4/20  -renal f/ u    #AMS/Lethargy  -ams likely from pain meds, embolic cva  -repeat CT 4/27 unremarkable  -MRI 5/11 revealed small acute infarctions in bilateral posterior centrum semiovale and left corona radiata and left posterior periventricular white matter  -repeat echo with no interval changes  -neuro following     #Acute on chronic HFpEF  -stable  -continue volume removal with HD  -c/w bb  -repeat echo with stable borderline lv fxn    #Chronic AF  -rates stable  -c/w cardizem 240 mg daily   -c/w metoprolol succ 100 mg qd  -c/w eliquis 5 mg BID for now - heme stable     #HTN  -stable  -c/w meds     #CAD, s/p PCI, most recent 2/2022  -stable, cont asa, plavix   -statin     #carotid stenosis   -previous MRA  noted with Greater than 75% stenosis of the right distal common carotid artery. Approximately 60% stenosis of the proximal left internal carotid artery.  -carotid dopplers 5/11 noted :  There is new occlusion of the left internal carotid artery;  greater than 70% stenosis involving the distal right common carotid artery as was seen previously. Mild to moderate flow-limiting stenosis is seen at the left external   carotid artery.  -CTa head and neck 5/12 noted  -Continue ASA/plavix, a/c  -await carotid angio, poss carotid stenting wed by neurosx  -patient remains cardiac optimized to proceed wit5h acceptable cardiac risk  -hold oral a/c per neuro sx, iv hep when eliquis on hold   please clarify with neuro sx recs regarding eliquis       d/w wife at bedside    45 minutes spent on total encounter; more than 50% of the visit was spent counseling and/or coordinating care by the attending physician.

## 2022-05-28 NOTE — PROGRESS NOTE ADULT - ASSESSMENT
Impression:  This is a 67 year old gentleman pmhx CAD s/p MI/PCI/CABG, hx TIA, afib on rivaroxaban, HTN, DM2, PVD, gout, L CEA 2014, and CKD who presented to [facility] 4/11/22 with ANT, tremors, confusion and lethargy.  CT head no acute changes.  S/p initiation of hemodialysis.  Mental status has improved dramatically.  Pt denies any headaches, no slurred speech, no hemiparesis.  He has chronic gout with bilateral finger swelling, pain.  He also has chronic neuropathy.  Both legs are weak.      Persistent encephalopathy prompted re-consultation on 5/10/22.  MRI brain was ordered and revealed small acute infarctions in bilateral posterior centrum semiovale and left corona radiata and left posterior periventricular white matter.  Carotid ultrasound was performed showing new occlusion of the left internal carotid artery, along with greater than 70% stenosis involving the distal right common carotid artery, and mild to moderate flow-limiting stenosis is seen at the left external carotid artery.  Vascular surgery has been consulted and is recommending CTA.  He continues to be encephalopathic and lethargic.      Diagnosis and Recommendations:  1.  Multifactorial delirium - has been improving    2. Complete left ICA occlusion and 70% distal CCA stenosis. Has collateral flow via acomm and pcomm but should be re-evaluated for either CEA or carotid stenting. CEA may be ideal considering issues with medication compliance and potential difficulty adhering to DAPT regimen but defer to proceduralists regarding this. On 5/25, Dr David discussed extensively with son at bedside and daughter via FaceTime: all options here have risk associated with them.  Opening the artery may or may not help.  However, it will reduce long-term stroke risk.  It might help with his mental status by helping brain perfusion, though this cannot be guaranteed.  There is risk associated with procedure.    Further questions regarding the procedure will need to be addressed by neurosurgery.   - now seen by IVETH Nolan , planned angiogram wednesday 6/1/22 with possible R carotid stent, requesting med and nephro clearance/coordination.    3. Reviewed MRI - CVAs in bilateral watershed territory which can potentially contribute to delirium, those these CVAs do not appear particularly large.      - MRA was suboptimal not visualizing the neck but MRA head showed left KESHAWN coming from ACOM and left PCA feeding left MCA.  ICA showed no flow.    - in setting of atrial fibrillation infarcts could potentially also be cardioembolic, continue anticoagulation as per cardiology  - s/p carotid doppler, suboptimal CTA/MRA.  However, it is apparent that there is a proximal left ICA occlusion, which would not be a candidate for intervention.  Intervention for right CCA/ICA stenosis would be high risk but it may indeed by a symptomatic stenosis.   - ECHO in February 2022 did not reveal severe cardiomyopathy, vegetation, or LV thrombus.    - HgA1c of 11 also driving stroke risk.  Goal is < 7  - continue high-intensity statin therapy    for angio with IVETH wednesday after med and renal clearance, possible R carotid stent, Dr Nloan.  supportive care otherwise by primary team

## 2022-05-28 NOTE — PROGRESS NOTE ADULT - SUBJECTIVE AND OBJECTIVE BOX
DATE OF SERVICE: 05-28-22 @ 15:19  CHIEF COMPLAINT:Patient is a 67y old  Male who presents with a chief complaint of Decreased urine output for the past week, leg oedema (28 May 2022 13:51)    	        PAST MEDICAL & SURGICAL HISTORY:  HTN (hypertension), benign      HLD (hyperlipidemia)      DM type 2 (diabetes mellitus, type 2)      TIA (transient ischemic attack)      Atrial fibrillation      MI (myocardial infarction)  circa 2014 and 2022.      CAD (coronary artery disease)      Neuropathy      Stage 3 chronic kidney disease      2019 novel coronavirus disease (COVID-19)  12/2021.  Received the COVID-19 vaccine x 2 as of April 2022.      Status post angioplasty with stent  LULU x 3 2/7/2014      S/P carotid endarterectomy  left              REVIEW OF SYSTEMS:    NECK: No pain or stiffness  RESPIRATORY: No cough, wheezing, chills or hemoptysis; No Shortness of Breath  CARDIOVASCULAR: No chest pain, palpitations, passing out,   GASTROINTESTINAL: No abdominal or epigastric pain. No nausea, vomiting, or hematemesis;   GENITOURINARY: No dysuria, frequency, hematuria, or incontinence  NEUROLOGICAL: No headaches,     Medications:  MEDICATIONS  (STANDING):  allopurinol 100 milliGRAM(s) Oral daily  apixaban 5 milliGRAM(s) Oral two times a day  aspirin  chewable 81 milliGRAM(s) Oral daily  atorvastatin 40 milliGRAM(s) Oral at bedtime  chlorhexidine 4% Liquid 1 Application(s) Topical <User Schedule>  clopidogrel Tablet 75 milliGRAM(s) Oral daily  dextrose 50% Injectable 25 Gram(s) IV Push once  dextrose 50% Injectable 25 Gram(s) IV Push once  dextrose 50% Injectable 12.5 Gram(s) IV Push once  diltiazem    milliGRAM(s) Oral daily  insulin lispro (ADMELOG) corrective regimen sliding scale   SubCutaneous three times a day before meals  insulin lispro (ADMELOG) corrective regimen sliding scale   SubCutaneous at bedtime  lidocaine   4% Patch 1 Patch Transdermal daily  metoprolol succinate  milliGRAM(s) Oral daily  mupirocin 2% Ointment 1 Application(s) Both Nostrils two times a day  Nephro-reena 1 Tablet(s) Oral daily  ofloxacin 0.3% Solution 1 Drop(s) Right EYE four times a day  polyethylene glycol 3350 17 Gram(s) Oral two times a day  povidone iodine 10% Solution 1 Application(s) Topical daily  senna 2 Tablet(s) Oral at bedtime    MEDICATIONS  (PRN):  bisacodyl 5 milliGRAM(s) Oral every 12 hours PRN Constipation  dextrose Oral Gel 15 Gram(s) Oral once PRN Blood Glucose LESS THAN 70 milliGRAM(s)/deciliter  sodium chloride 0.9% lock flush 10 milliLiter(s) IV Push every 1 hour PRN Pre/post blood products, medications, blood draw, and to maintain line patency    	    PHYSICAL EXAM:  T(C): 36.3 (05-28-22 @ 12:57), Max: 36.6 (05-28-22 @ 11:55)  HR: 87 (05-28-22 @ 12:57) (78 - 94)  BP: 162/65 (05-28-22 @ 12:57) (151/67 - 169/69)  RR: 19 (05-28-22 @ 12:57) (18 - 20)  SpO2: 95% (05-28-22 @ 12:57) (91% - 95%)  Wt(kg): --  I&O's Summary        HEENT:   Normal oral mucosa,  Lymphatic: No lymphadenopathy  Cardiovascular: Normal S1 S2, No JVD,   Respiratory: dec bs     Gastrointestinal:  Soft, Non-tender, + BS	  dec rom   pvd      TELEMETRY: 	    ECG:  	  RADIOLOGY:  OTHER: 	  	  LABS:	 	    CARDIAC MARKERS:                                7.7    11.21 )-----------( 353      ( 28 May 2022 13:06 )             26.9     05-28    137  |  98  |  26<H>  ----------------------------<  167<H>  3.7   |  27  |  5.94<H>    Ca    8.5      28 May 2022 13:08  Phos  4.0     05-28      proBNP:   Lipid Profile:   HgA1c:   TSH:

## 2022-05-28 NOTE — PROGRESS NOTE ADULT - SUBJECTIVE AND OBJECTIVE BOX
[facility]   [phone]  RENAL FOLLOW UP NOTE  --------------------------------------------------------------------------------  Chief Complaint:    24 hour events/subjective:    seen earlier   confused     PAST HISTORY  --------------------------------------------------------------------------------  No significant changes to PMH, PSH, FHx, SHx, unless otherwise noted    ALLERGIES & MEDICATIONS  --------------------------------------------------------------------------------  Allergies    nitrofurantoin (Nephrotoxicity)    Intolerances      Standing Inpatient Medications  allopurinol 100 milliGRAM(s) Oral daily  apixaban 5 milliGRAM(s) Oral two times a day  aspirin  chewable 81 milliGRAM(s) Oral daily  atorvastatin 40 milliGRAM(s) Oral at bedtime  chlorhexidine 4% Liquid 1 Application(s) Topical <User Schedule>  clopidogrel Tablet 75 milliGRAM(s) Oral daily  dextrose 50% Injectable 25 Gram(s) IV Push once  dextrose 50% Injectable 25 Gram(s) IV Push once  dextrose 50% Injectable 12.5 Gram(s) IV Push once  diltiazem    milliGRAM(s) Oral daily  insulin lispro (ADMELOG) corrective regimen sliding scale   SubCutaneous three times a day before meals  insulin lispro (ADMELOG) corrective regimen sliding scale   SubCutaneous at bedtime  lidocaine   4% Patch 1 Patch Transdermal daily  metoprolol succinate  milliGRAM(s) Oral daily  mupirocin 2% Ointment 1 Application(s) Both Nostrils two times a day  Nephro-reena 1 Tablet(s) Oral daily  ofloxacin 0.3% Solution 1 Drop(s) Right EYE four times a day  polyethylene glycol 3350 17 Gram(s) Oral two times a day  povidone iodine 10% Solution 1 Application(s) Topical daily  senna 2 Tablet(s) Oral at bedtime    PRN Inpatient Medications  bisacodyl 5 milliGRAM(s) Oral every 12 hours PRN  dextrose Oral Gel 15 Gram(s) Oral once PRN  sodium chloride 0.9% lock flush 10 milliLiter(s) IV Push every 1 hour PRN      REVIEW OF SYSTEMS  --------------------------------------------------------------------------------      VITALS/PHYSICAL EXAM  --------------------------------------------------------------------------------  T(C): 36.3 (05-28-22 @ 12:57), Max: 36.6 (05-28-22 @ 11:55)  HR: 87 (05-28-22 @ 12:57) (78 - 94)  BP: 162/65 (05-28-22 @ 12:57) (151/67 - 169/69)  RR: 19 (05-28-22 @ 12:57) (18 - 20)  SpO2: 95% (05-28-22 @ 12:57) (91% - 95%)  Wt(kg): --        Physical Exam:  	     Gen:  confused, awake   	Pulm: Decreased breath sounds b/l bases.  	CV: No JVD. IRR  	Abd: +BS, soft, nontender, softly distended, obese               Extremity no edema              Extremity LE: + hyperpigmented bilateral LE with no edema              Vascular: R IJ HD catheter, L arm AVF       LABS/STUDIES  --------------------------------------------------------------------------------              7.7    11.21 >-----------<  353      [05-28-22 @ 13:06]              26.9     137  |  98  |  26  ----------------------------<  167      [05-28-22 @ 13:08]  3.7   |  27  |  5.94        Ca     8.5     [05-28-22 @ 13:08]      Phos  4.0     [05-28-22 @ 13:08]      PT/INR: PT 23.0 , INR 1.97       [05-27-22 @ 07:07]  PTT: 32.5       [05-27-22 @ 07:07]      Creatinine Trend:  SCr 5.94 [05-28 @ 13:08]  SCr 4.48 [05-27 @ 06:59]  SCr 5.44 [05-26 @ 07:15]  SCr 4.33 [05-25 @ 11:51]  SCr 6.84 [05-24 @ 14:22]                  Iron 21, TIBC 245, %sat 9      [05-17-22 @ 05:54]  Ferritin 120      [05-17-22 @ 05:54]  PTH -- (Ca 8.1)      [05-28-22 @ 13:06]   96  PTH -- (Ca 8.5)      [05-24-22 @ 14:22]   84  PTH -- (Ca 8.3)      [04-15-22 @ 12:18]   150  PTH -- (Ca --)      [04-03-22 @ 23:06]   97  HbA1c 11.7      [11-07-19 @ 09:14]  TSH 1.71      [05-12-22 @ 07:11]

## 2022-05-29 NOTE — CHART NOTE - NSCHARTNOTEFT_GEN_A_CORE
Pt planned for cerebral angiogram on Wednesday, will  stop Eliquis tonight  and add Heparin drip  for AM   Neuro sx aware   Jordana Humphreys  NP-C [id number]

## 2022-05-29 NOTE — PROGRESS NOTE ADULT - SUBJECTIVE AND OBJECTIVE BOX
Patient seen and examined at bedside.    --Anticoagulation--  clopidogrel Tablet 75 milliGRAM(s) Oral daily    T(C): 36.7 (05-29-22 @ 04:08), Max: 36.7 (05-29-22 @ 04:08)  HR: 70 (05-29-22 @ 04:08) (70 - 98)  BP: 158/71 (05-29-22 @ 05:30) (149/66 - 193/68)  RR: 18 (05-29-22 @ 04:08) (18 - 20)  SpO2: 94% (05-29-22 @ 04:08) (93% - 99%)  Wt(kg): --    Exam: AOx2, FC, ROA AG L>R

## 2022-05-29 NOTE — PROGRESS NOTE ADULT - SUBJECTIVE AND OBJECTIVE BOX
CARDIOLOGY FOLLOW UP NOTE - DR. SAGASTUME    Patient Name: DYLAN DEL CASTILLO  Date of Service: 22 @ 11:20    Patient seen and examined  more awake today   dtr at bedside    Subjective:    cv: denies chest pain, dyspnea, palpitations, dizziness  pulmonary: denies cough  GI: denies abdominal pain, nausea, vomiting  vascular/legs: no edema   skin: no rash  ROS: otherwise negative   overnight events:      PHYSICAL EXAM:  T(C): 36.6 (22 @ 09:30), Max: 36.7 (22 @ 04:08)  HR: 88 (22 @ 09:30) (70 - 98)  BP: 160/73 (22 @ 09:30) (149/66 - 193/68)  RR: 18 (22 @ 09:30) (18 - 20)  SpO2: 99% (22 @ 09:30) (93% - 99%)  Wt(kg): --  I&O's Summary    28 May 2022 07:01  -  29 May 2022 07:00  --------------------------------------------------------  IN: 0 mL / OUT: 1000 mL / NET: -1000 mL      Daily     Daily Weight in k.4 (28 May 2022 16:45)    Appearance: Normal	  Cardiovascular: Normal S1 S2, irreg  Respiratory: Lungs clear to auscultation	  Gastrointestinal:  Soft, Non-tender, + BS	  Extremities: Normal range of motion, No clubbing, cyanosis or edema      Home Medications:  allopurinol 100 mg oral tablet: 1 tab(s) orally once a day (2022 06:34)  aspirin 81 mg oral delayed release tablet: 1 tab(s) orally once a day (2022 06:34)  bumetanide 2 mg oral tablet: 1 tab(s) orally once a day (2022 06:34)  insulin glargine 100 units/mL subcutaneous solution: 25 unit(s) subcutaneous once a day (at bedtime) (2022 06:34)  metOLazone 5 mg oral tablet: 1 tab(s) orally 3 times a week (2022 06:34)  metoprolol succinate 50 mg oral tablet, extended release: 2 tab(s) orally once a day (2022 06:34)  NovoLOG 100 units/mL subcutaneous solution: subcutaneous 4 times a day (before meals and at bedtime) (2022 06:34)  NovoLOG FlexPen 100 units/mL injectable solution: 10 unit(s) subcutaneous 3 times a day (2022 06:34)  oxycodone-acetaminophen 5 mg-325 mg oral tablet: 1 tab(s) orally 2 times a day (2022 06:34)  Pradaxa 150 mg oral capsule: 1 cap(s) orally 2 times a day (2022 06:34)  pregabalin 100 mg oral capsule: 1 cap(s) orally 3 times a day (2022 06:34)      MEDICATIONS  (STANDING):  allopurinol 100 milliGRAM(s) Oral daily  apixaban 5 milliGRAM(s) Oral two times a day  aspirin  chewable 81 milliGRAM(s) Oral daily  atorvastatin 40 milliGRAM(s) Oral at bedtime  chlorhexidine 4% Liquid 1 Application(s) Topical <User Schedule>  clopidogrel Tablet 75 milliGRAM(s) Oral daily  dextrose 50% Injectable 25 Gram(s) IV Push once  dextrose 50% Injectable 12.5 Gram(s) IV Push once  dextrose 50% Injectable 25 Gram(s) IV Push once  diltiazem    milliGRAM(s) Oral daily  insulin lispro (ADMELOG) corrective regimen sliding scale   SubCutaneous at bedtime  insulin lispro (ADMELOG) corrective regimen sliding scale   SubCutaneous three times a day before meals  lidocaine   4% Patch 1 Patch Transdermal daily  metoprolol succinate  milliGRAM(s) Oral daily  mupirocin 2% Ointment 1 Application(s) Both Nostrils two times a day  Nephro-reena 1 Tablet(s) Oral daily  ofloxacin 0.3% Solution 1 Drop(s) Right EYE four times a day  polyethylene glycol 3350 17 Gram(s) Oral two times a day  povidone iodine 10% Solution 1 Application(s) Topical daily  senna 2 Tablet(s) Oral at bedtime      TELEMETRY: 	    ECG:  	  RADIOLOGY:   DIAGNOSTIC TESTING:  [ ] Echocardiogram:  [ ] Catheterization:  [ ] Stress Test:    OTHER: 	    LABS:	 	    CARDIAC MARKERS:                                      7.7    11.21 )-----------( 353      ( 28 May 2022 13:06 )             26.9         137  |  98  |  26<H>  ----------------------------<  167<H>  3.7   |  27  |  5.94<H>    Ca    8.5      28 May 2022 13:08  Phos  4.0           proBNP:     Lipid Profile:   HgA1c:     Creatinine, Serum: 5.94 mg/dL (22 @ 13:08)  Creatinine, Serum: 4.48 mg/dL (22 @ 06:59)

## 2022-05-29 NOTE — PROGRESS NOTE ADULT - SUBJECTIVE AND OBJECTIVE BOX
Patient is a 67y old  Male who presents with a chief complaint of Decreased urine output for the past week, leg oedema (29 May 2022 11:20)    Coverage for Dr. Mendoza Corral   SUBJECTIVE / OVERNIGHT EVENTS: Comfortable Family at bedside.   Review of Systems  chest pain no  palpitations no  sob no  nausea no  headache no    MEDICATIONS  (STANDING):  allopurinol 100 milliGRAM(s) Oral daily  apixaban 5 milliGRAM(s) Oral once  aspirin  chewable 81 milliGRAM(s) Oral daily  atorvastatin 40 milliGRAM(s) Oral at bedtime  chlorhexidine 4% Liquid 1 Application(s) Topical <User Schedule>  clopidogrel Tablet 75 milliGRAM(s) Oral daily  dextrose 50% Injectable 25 Gram(s) IV Push once  dextrose 50% Injectable 25 Gram(s) IV Push once  dextrose 50% Injectable 12.5 Gram(s) IV Push once  diltiazem    milliGRAM(s) Oral daily  insulin lispro (ADMELOG) corrective regimen sliding scale   SubCutaneous three times a day before meals  insulin lispro (ADMELOG) corrective regimen sliding scale   SubCutaneous at bedtime  lidocaine   4% Patch 1 Patch Transdermal daily  metoprolol succinate  milliGRAM(s) Oral daily  mupirocin 2% Ointment 1 Application(s) Both Nostrils two times a day  Nephro-reena 1 Tablet(s) Oral daily  ofloxacin 0.3% Solution 1 Drop(s) Right EYE four times a day  polyethylene glycol 3350 17 Gram(s) Oral two times a day  povidone iodine 10% Solution 1 Application(s) Topical daily  senna 2 Tablet(s) Oral at bedtime    MEDICATIONS  (PRN):  bisacodyl 5 milliGRAM(s) Oral every 12 hours PRN Constipation  dextrose Oral Gel 15 Gram(s) Oral once PRN Blood Glucose LESS THAN 70 milliGRAM(s)/deciliter  sodium chloride 0.9% lock flush 10 milliLiter(s) IV Push every 1 hour PRN Pre/post blood products, medications, blood draw, and to maintain line patency      Vital Signs Last 24 Hrs  T(C): 36.4 (29 May 2022 16:42), Max: 37 (29 May 2022 12:42)  T(F): 97.5 (29 May 2022 16:42), Max: 98.6 (29 May 2022 12:42)  HR: 91 (29 May 2022 16:42) (70 - 92)  BP: 158/78 (29 May 2022 16:42) (142/63 - 193/68)  BP(mean): --  RR: 18 (29 May 2022 16:42) (18 - 18)  SpO2: 96% (29 May 2022 16:42) (93% - 99%)    PHYSICAL EXAM:  GENERAL: NAD, well-developed  HEAD:  Atraumatic, Normocephalic  EYES: EOMI, PERRLA, conjunctiva and sclera clear  NECK: Supple, No JVD  CHEST/LUNG: Clear to auscultation bilaterally; No wheeze  HEART: Regular rate and rhythm; No murmurs, rubs, or gallops  ABDOMEN: Soft, Nontender, Nondistended; Bowel sounds present  EXTREMITIES:  2+ Peripheral Pulses, No clubbing, cyanosis, or edema  NEUROLOGY: non-focal  SKIN: No rashes or lesions    LABS:                        8.7    12.42 )-----------( 337      ( 29 May 2022 16:28 )             29.9     05-29    134<L>  |  94<L>  |  20  ----------------------------<  150<H>  3.7   |  28  |  5.05<H>    Ca    8.8      29 May 2022 16:28  Phos  3.0     05-29  Mg     2.2     05-29                  RADIOLOGY & ADDITIONAL TESTS:    Imaging Personally Reviewed:    Consultant(s) Notes Reviewed:      Care Discussed with Consultants/Other Providers:

## 2022-05-29 NOTE — PROGRESS NOTE ADULT - ASSESSMENT
Echo 5/13/22:  1. Normal left ventricular internal dimensions and wall  thicknesses.  2. Endocardial visualization enhanced with intravenous  injection of Ultrasonic Enhancing Agent (Lumason). Grossly  borderline normal left ventricular systolic function; no  obvious segmental wall motion abnormality.  3. The right ventricle is not well visualized.  *** Compared with echocardiogram of 2/22/2022, no  significant changes noted.      A/P    66 y/o M with history of CAD, s/p multiple PCI, with most recent 2/22 PCI of LAD in setting of NSTEMI, on ASA only (pradaxa), chronic atrial fibrillation maintained on Pradaxa, essential HTN, type 2 DM previously on oral medications but on insulin, diabetic neuropathy with chronic RIGHT LE venous stasis changes>left, LEFT foot ulcer seen by podiatry, past endarterectomy LEFT CEA in 2014 presenting with 1 week of decreased urine output, cystitis with hematuria     #ANT on CKD, new HD  -oliguric renal failure in setting of UTI/cystitis  -New HD for uremia, renal failure  -s/p L AVG 4/18, s/p permacath placement 4/20  -renal f/ u    #AMS/Lethargy  -ams likely from pain meds, embolic cva  -repeat CT 4/27 unremarkable  -MRI 5/11 revealed small acute infarctions in bilateral posterior centrum semiovale and left corona radiata and left posterior periventricular white matter  -repeat echo with no interval changes  -neuro following     #Acute on chronic HFpEF  -stable  -continue volume removal with HD  -c/w bb  -repeat echo with stable borderline lv fxn    #Chronic AF  -rates stable  -c/w cardizem 240 mg daily   -c/w metoprolol succ 100 mg qd  -c/w eliquis 5 mg BID for now - heme stable     #HTN  -stable  -c/w meds     #CAD, s/p PCI, most recent 2/2022  -stable, cont asa, plavix   -statin     #carotid stenosis   -previous MRA  noted with Greater than 75% stenosis of the right distal common carotid artery. Approximately 60% stenosis of the proximal left internal carotid artery.  -carotid dopplers 5/11 noted :  There is new occlusion of the left internal carotid artery;  greater than 70% stenosis involving the distal right common carotid artery as was seen previously. Mild to moderate flow-limiting stenosis is seen at the left external   carotid artery.  -CTa head and neck 5/12 noted  -Continue ASA/plavix, a/c  -await carotid angio, poss carotid stenting wed by neurosx  -patient remains cardiac optimized to proceed wit5h acceptable cardiac risk  -hold oral a/c per neuro sx, iv hep when eliquis on hold   please clarify with neuro sx recs regarding eliquis       d/w dtr at bedside  d/w acp    45 minutes spent on total encounter; more than 50% of the visit was spent counseling and/or coordinating care by the attending physician.

## 2022-05-29 NOTE — PROGRESS NOTE ADULT - ASSESSMENT
67M, CVA w/o deficits s/p 2014 Left CEA w/ Vasc Surg Dr. Pinto, CAD, T2DM, AF. On ASA81, Eliquis. Admitted 4/3 for lethargy, found to have renal failure, now on HD. CTA on 5/12 shows Left ICA proximal occlusion with distal IC reconstitution. MRI shows b/l centrum semiovale, lacunar Left corona radiata infarcts. Carotid u/s also shows Right CCA >70% stenosis. Exam: Awake, no verbal outpt, Ox0 (occasionally Ox1-2 per nurse), intermittently FC/mimics, EOMI, PERRL, ROA spontaneously L>R  -Admitted to Medicine  -Preop for Angio, w/ possible R carotid  stent on Wednesday  -Pulm considering draining pleural effusions  -Needs documented medical clearance  -Needs Nephro clearance/coordination prior to Wednesday  -Needs documented Pulm clearance

## 2022-05-30 NOTE — PROGRESS NOTE ADULT - ASSESSMENT
67M, CVA w/o deficits s/p 2014 Left CEA w/ Vasc Surg Dr. Pinto, CAD, T2DM, AF. On ASA81, Eliquis. Admitted 4/3 for lethargy, found to have renal failure, now on HD. CTA on 5/12 shows Left ICA proximal occlusion with distal IC reconstitution. MRI shows b/l centrum semiovale, lacunar Left corona radiata infarcts. Carotid u/s also shows Right CCA >70% stenosis. Exam: Awake, no verbal outpt, Ox0 (occasionally Ox1-2 per nurse), intermittently FC/mimics, EOMI, PERRL, ROA spontaneously L>R  -Admitted to Medicine  -Preop for Angio, w/ possible R carotid  stent on Wednesday  -Per med note, CXR today, possible pleural effusion tap Tuesday  -Cr 5.05<5.94, needs nephro clearance/coordination of HD since going for angio Wednesday  -Needs documented medical clearance  -Needs documented Pulm clearance

## 2022-05-30 NOTE — PROGRESS NOTE ADULT - SUBJECTIVE AND OBJECTIVE BOX
[facility]   [phone]  RENAL FOLLOW UP NOTE  --------------------------------------------------------------------------------  Chief Complaint:    24 hour events/subjective:    seen earlier   communicative     PAST HISTORY  --------------------------------------------------------------------------------  No significant changes to PMH, PSH, FHx, SHx, unless otherwise noted    ALLERGIES & MEDICATIONS  --------------------------------------------------------------------------------  Allergies    nitrofurantoin (Nephrotoxicity)    Intolerances      Standing Inpatient Medications  allopurinol 100 milliGRAM(s) Oral daily  apixaban 5 milliGRAM(s) Oral two times a day  aspirin  chewable 81 milliGRAM(s) Oral daily  atorvastatin 40 milliGRAM(s) Oral at bedtime  chlorhexidine 4% Liquid 1 Application(s) Topical <User Schedule>  clopidogrel Tablet 75 milliGRAM(s) Oral daily  dextrose 50% Injectable 25 Gram(s) IV Push once  dextrose 50% Injectable 12.5 Gram(s) IV Push once  dextrose 50% Injectable 25 Gram(s) IV Push once  diltiazem    milliGRAM(s) Oral daily  insulin lispro (ADMELOG) corrective regimen sliding scale   SubCutaneous at bedtime  insulin lispro (ADMELOG) corrective regimen sliding scale   SubCutaneous three times a day before meals  lidocaine   4% Patch 1 Patch Transdermal daily  metoprolol succinate  milliGRAM(s) Oral daily  mupirocin 2% Ointment 1 Application(s) Both Nostrils two times a day  Nephro-reena 1 Tablet(s) Oral daily  ofloxacin 0.3% Solution 1 Drop(s) Right EYE four times a day  polyethylene glycol 3350 17 Gram(s) Oral two times a day  povidone iodine 10% Solution 1 Application(s) Topical daily  senna 2 Tablet(s) Oral at bedtime    PRN Inpatient Medications  bisacodyl 5 milliGRAM(s) Oral every 12 hours PRN  dextrose Oral Gel 15 Gram(s) Oral once PRN  sodium chloride 0.9% lock flush 10 milliLiter(s) IV Push every 1 hour PRN      REVIEW OF SYSTEMS  --------------------------------------------------------------------------------    Gen: denies chills,  CVS: denies chest pain/palpitations  Resp: denies SOB/Cough  GI: Denies N/V/Abd pain  : Denies dysuria    VITALS/PHYSICAL EXAM  --------------------------------------------------------------------------------  T(C): 35.8 (05-30-22 @ 20:09), Max: 36.7 (05-30-22 @ 12:41)  HR: 94 (05-30-22 @ 20:09) (89 - 94)  BP: 135/74 (05-30-22 @ 20:09) (135/74 - 167/75)  RR: 18 (05-30-22 @ 20:09) (18 - 19)  SpO2: 97% (05-30-22 @ 20:09) (94% - 97%)  Wt(kg): --        05-29-22 @ 07:01  -  05-30-22 @ 07:00  --------------------------------------------------------  IN: 360 mL / OUT: 0 mL / NET: 360 mL      Physical Exam:  	    67 year old gentleman with PMH of CAD, NSTEMI s/p 3 stents in 2014 (stents to LAD, proximal and distal LCx), ischemic cardiomyopathy, HTN for 10 years, T2DM for 26 years c/b peripheral neuropathy, CVA, TIA, Afib on Pradaxa, chronic RLE wound, L carotid stenosis s/p endarterectomy (2014) just recently admitted  to the [facility] on 2/19/2022 with acute onset chest pain for one day found to have an NSTEMI, CAD, s/p PCI in setting of increased AF rates off bb for 3 days and resolved chest pain, his CKMB peaked and Echo noted with EF 60%,normal LV function, mild TR, mild UT. He was s/p [facility] with 85% proximal LAD s/p balloon angioplasty and LULU. He presented to [facility] ED with decreased urine output over the week. Mr. Stevens went to city [facility] for hematuria and was started  on Nitrofurantoin. Pt is still taking Nitrofurantoin w/ 5 days left. He came to the ED due to worsening symptoms. Pt reports having a similar incident 4-5 years ago that resolved spontaneously. He reports increased leg edema Dyspnea worse on exertion. his baseline Serum creatinine is in the 2.0 to 2.3 mg/dl range. ANT with serum creatinine of 8.29 with bun 144 and k 5.5      1- ESRD likely   2- chf chronic   3- hyperphosphatemia /shpt   4- anemia   5- hyperlipidemia   6- HTN /a fib  7- TIA hx   8- hx of carotid stenosis      hd am will attempt to improve fluid status with it  continue metoprolol  anemia - epogen [id number] Units TIW with HD.    venofer 100 mg IVP with HD  trend hgb  check intact pth and phos   no renal contraindication to proceed with neuro angio       LABS/STUDIES  --------------------------------------------------------------------------------              8.7    12.42 >-----------<  337      [05-29-22 @ 16:28]              29.9     134  |  94  |  20  ----------------------------<  150      [05-29-22 @ 16:28]  3.7   |  28  |  5.05        Ca     8.8     [05-29-22 @ 16:28]      Mg     2.2     [05-29-22 @ 16:28]      Phos  3.0     [05-29-22 @ 16:28]            Creatinine Trend:  SCr 5.05 [05-29 @ 16:28]  SCr 5.94 [05-28 @ 13:08]  SCr 4.48 [05-27 @ 06:59]  SCr 5.44 [05-26 @ 07:15]  SCr 4.33 [05-25 @ 11:51]                  Iron 21, TIBC 245, %sat 9      [05-17-22 @ 05:54]  Ferritin 120      [05-17-22 @ 05:54]  PTH -- (Ca 8.1)      [05-28-22 @ 13:06]   96  PTH -- (Ca 8.5)      [05-24-22 @ 14:22]   84  PTH -- (Ca 8.3)      [04-15-22 @ 12:18]   150  PTH -- (Ca --)      [04-03-22 @ 23:06]   97  HbA1c 11.7      [11-07-19 @ 09:14]  TSH 1.71      [05-12-22 @ 07:11]

## 2022-05-30 NOTE — STROKE CODE NOTE - NIH STROKE SCALE: 2. BEST GAZE, QM
Detail Level: Detailed (1) Partial gaze palsy; gaze is abnormal in one or both eyes, but forced deviation or total gaze paresis is not present

## 2022-05-30 NOTE — PROGRESS NOTE ADULT - PROVIDER SPECIALTY LIST ADULT
[facility]  Progress Note    [facility] Patient Status:  Inpatient    1939 MRN [id number]   [facility] 8NE-A Attending Winifred Anthony MD   Hosp Day # 7 PCP Kaitlin Laura MD     Subjective:  Pt doing well today.  She fe Neurosurgery

## 2022-05-30 NOTE — PROGRESS NOTE ADULT - ASSESSMENT
INCOMPLETE NOTE. Assessment: 66 yo male with a PMHx of CAD (s/p MI/PCI/CABG), h/o TIA, atrial fibrillation (on rivaroxaban prior to admission), HTN, DM2, PVD, gout, L CEA (2014), and CKD who presented to [facility] on 04/03 for ~ 1 week of decreased urine output and increased lower extremity edema. Patient with prolonged hospital course. [facility] initially consulted 04/10 for AMS, felt to be due to toxic-metabolic encephalopathy at that time. Now with ESRD on HD. Mental status was improving with HD. [facility] was re-consulted on 05/10 for persistent encephalopathy and concern for h/o carotid stenosis. MRI brain revealed small acute infarctions in bilateral posterior CS, L CR, and L posterior periventricular white matter. Carotid ultrasound showed new occlusion of the L ICA along with > 70% stenosis involving the distal R CCA. Neurosurgery was consulted on 05/23. Patient planned for angio with possible L ICA stent placement on 06/01. Code stroke was called on 05/30 for AMS and possible R HP. CTH and CTA H/N unchanged from prior studies. Labs notable for WBC 17.38 <H>, Creatinine 6.75 <H>, BUN 64 <H>.    Impression: Altered mental status, global aphasia, R HP. Possibly due to toxic metabolic encephalopathy vs acute ischemic stroke vs recrudescence of prior stroke vs hypoperfusion of L MCA territory in setting of occluded L ICA.    Recommendations:  [] Neuro checks Q4HR.  [] Telemetry.  [] Fall/Aspiration precautions.  [] BP goal: Avoid hypotension. Would keep SBP > 120.  [] NPO unless patient passes bedside dysphagia screen. Formal S&S eval if he fails.  [] Repeat MRI Head w/o contrast.  [] Likely needs urgent HD, especially given large contrast bolus given during code stroke.  [] Neurosurgery following, tentatively planned for angio on 06/01.  [] No neurologic contraindication to c/w ASA, Plavix, and Eliquis.  [] c/w Atorvastatin 40MG PO QHS (titrate for goal LDL < 70).  [] Send HbA1c, AM Lipid Panel.  [x] 05/13 TTE: EF 55%.  [] DVT PPx: On full dose AC.  [] Diet: NPO.  [] Infectious workup and rest of care per primary team.    Patient discussed with stroke fellow Dr. Natalya Colon. Final recommendations regarding management to be confirmed upon review by neurology attending Dr. Pearce.

## 2022-05-30 NOTE — PROGRESS NOTE ADULT - SUBJECTIVE AND OBJECTIVE BOX
CARDIOLOGY FOLLOW UP NOTE - DR. SAGASTUME    Patient Name: DYLAN DEL CASTILLO  Date of Service: 22     no new events    Subjective:    cv: denies chest pain, dyspnea, palpitations, dizziness  pulmonary: denies cough  GI: denies abdominal pain, nausea, vomiting  vascular/legs: no edema   skin: no rash  ROS: otherwise negative   overnight events:      PHYSICAL EXAM:  T(C): 36.6 (22 @ 04:33), Max: 37 (22 @ 12:42)  HR: 90 (22 @ 04:33) (88 - 91)  BP: 167/75 (22 @ 04:33) (142/63 - 167/75)  RR: 18 (22 @ 04:33) (18 - 18)  SpO2: 96% (22 @ 04:33) (94% - 96%)  Wt(kg): --  I&O's Summary    29 May 2022 07:01  -  30 May 2022 07:00  --------------------------------------------------------  IN: 360 mL / OUT: 0 mL / NET: 360 mL      Daily     Daily Weight in k.8 (29 May 2022 16:42)    Appearance: Normal	  Cardiovascular: Normal S1 S2,RRR, No JVD, No murmurs  Respiratory: Lungs clear to auscultation	  Gastrointestinal:  Soft, Non-tender, + BS	  Extremities: Normal range of motion, No clubbing, cyanosis or edema      Home Medications:  allopurinol 100 mg oral tablet: 1 tab(s) orally once a day (2022 06:34)  aspirin 81 mg oral delayed release tablet: 1 tab(s) orally once a day (2022 06:34)  bumetanide 2 mg oral tablet: 1 tab(s) orally once a day (2022 06:34)  insulin glargine 100 units/mL subcutaneous solution: 25 unit(s) subcutaneous once a day (at bedtime) (2022 06:34)  metOLazone 5 mg oral tablet: 1 tab(s) orally 3 times a week (2022 06:34)  metoprolol succinate 50 mg oral tablet, extended release: 2 tab(s) orally once a day (2022 06:34)  NovoLOG 100 units/mL subcutaneous solution: subcutaneous 4 times a day (before meals and at bedtime) (2022 06:34)  NovoLOG FlexPen 100 units/mL injectable solution: 10 unit(s) subcutaneous 3 times a day (2022 06:34)  oxycodone-acetaminophen 5 mg-325 mg oral tablet: 1 tab(s) orally 2 times a day (2022 06:34)  Pradaxa 150 mg oral capsule: 1 cap(s) orally 2 times a day (2022 06:34)  pregabalin 100 mg oral capsule: 1 cap(s) orally 3 times a day (2022 06:34)      MEDICATIONS  (STANDING):  allopurinol 100 milliGRAM(s) Oral daily  apixaban 5 milliGRAM(s) Oral two times a day  aspirin  chewable 81 milliGRAM(s) Oral daily  atorvastatin 40 milliGRAM(s) Oral at bedtime  chlorhexidine 4% Liquid 1 Application(s) Topical <User Schedule>  clopidogrel Tablet 75 milliGRAM(s) Oral daily  dextrose 50% Injectable 25 Gram(s) IV Push once  dextrose 50% Injectable 25 Gram(s) IV Push once  dextrose 50% Injectable 12.5 Gram(s) IV Push once  diltiazem    milliGRAM(s) Oral daily  insulin lispro (ADMELOG) corrective regimen sliding scale   SubCutaneous three times a day before meals  insulin lispro (ADMELOG) corrective regimen sliding scale   SubCutaneous at bedtime  lidocaine   4% Patch 1 Patch Transdermal daily  metoprolol succinate  milliGRAM(s) Oral daily  mupirocin 2% Ointment 1 Application(s) Both Nostrils two times a day  Nephro-reena 1 Tablet(s) Oral daily  ofloxacin 0.3% Solution 1 Drop(s) Right EYE four times a day  polyethylene glycol 3350 17 Gram(s) Oral two times a day  povidone iodine 10% Solution 1 Application(s) Topical daily  senna 2 Tablet(s) Oral at bedtime      TELEMETRY: 	    ECG:  	  RADIOLOGY:   DIAGNOSTIC TESTING:  [ ] Echocardiogram:  [ ] Catheterization:  [ ] Stress Test:    OTHER: 	    LABS:	 	    CARDIAC MARKERS:                                      8.7    12.42 )-----------( 337      ( 29 May 2022 16:28 )             29.9         134<L>  |  94<L>  |  20  ----------------------------<  150<H>  3.7   |  28  |  5.05<H>    Ca    8.8      29 May 2022 16:28  Phos  3.0       Mg     2.2           proBNP:     Lipid Profile:   HgA1c:     Creatinine, Serum: 5.05 mg/dL (22 @ 16:28)  Creatinine, Serum: 5.94 mg/dL (22 @ 13:08)

## 2022-05-30 NOTE — PROGRESS NOTE ADULT - SUBJECTIVE AND OBJECTIVE BOX
Patient is a 67y old  Male who presents with a chief complaint of Decreased urine output for the past week, leg oedema (30 May 2022 12:33)    Coverage for Dr. Mendoza Corral   SUBJECTIVE / OVERNIGHT EVENTS: Comfortable without new complaints. Wife at bedside.  Review of Systems  chest pain no  palpitations no  sob no  nausea no  headache no    MEDICATIONS  (STANDING):  allopurinol 100 milliGRAM(s) Oral daily  apixaban 5 milliGRAM(s) Oral two times a day  aspirin  chewable 81 milliGRAM(s) Oral daily  atorvastatin 40 milliGRAM(s) Oral at bedtime  chlorhexidine 4% Liquid 1 Application(s) Topical <User Schedule>  clopidogrel Tablet 75 milliGRAM(s) Oral daily  dextrose 50% Injectable 25 Gram(s) IV Push once  dextrose 50% Injectable 25 Gram(s) IV Push once  dextrose 50% Injectable 12.5 Gram(s) IV Push once  diltiazem    milliGRAM(s) Oral daily  insulin lispro (ADMELOG) corrective regimen sliding scale   SubCutaneous three times a day before meals  insulin lispro (ADMELOG) corrective regimen sliding scale   SubCutaneous at bedtime  lidocaine   4% Patch 1 Patch Transdermal daily  metoprolol succinate  milliGRAM(s) Oral daily  mupirocin 2% Ointment 1 Application(s) Both Nostrils two times a day  Nephro-reena 1 Tablet(s) Oral daily  ofloxacin 0.3% Solution 1 Drop(s) Right EYE four times a day  polyethylene glycol 3350 17 Gram(s) Oral two times a day  povidone iodine 10% Solution 1 Application(s) Topical daily  senna 2 Tablet(s) Oral at bedtime    MEDICATIONS  (PRN):  bisacodyl 5 milliGRAM(s) Oral every 12 hours PRN Constipation  dextrose Oral Gel 15 Gram(s) Oral once PRN Blood Glucose LESS THAN 70 milliGRAM(s)/deciliter  sodium chloride 0.9% lock flush 10 milliLiter(s) IV Push every 1 hour PRN Pre/post blood products, medications, blood draw, and to maintain line patency      Vital Signs Last 24 Hrs  T(C): 36.7 (30 May 2022 12:41), Max: 36.7 (30 May 2022 12:41)  T(F): 98.1 (30 May 2022 12:41), Max: 98.1 (30 May 2022 12:41)  HR: 89 (30 May 2022 12:41) (89 - 90)  BP: 143/60 (30 May 2022 12:41) (143/60 - 167/75)  BP(mean): --  RR: 19 (30 May 2022 12:41) (18 - 19)  SpO2: 94% (30 May 2022 12:41) (94% - 96%)    PHYSICAL EXAM:  GENERAL: NAD  HEAD:  Atraumatic, Normocephalic  EYES: EOMI, PERRLA, conjunctiva and sclera clear  NECK: Supple, No JVD  CHEST/LUNG: Clear to auscultation bilaterally; No wheeze  HEART: Regular rate and rhythm; No murmurs, rubs, or gallops  ABDOMEN: Soft, Nontender, Nondistended; Bowel sounds present  EXTREMITIES:  2+ bipedal edema  NEUROLOGY: non-focal  SKIN: No rashes or lesions    LABS:                        8.7    12.42 )-----------( 337      ( 29 May 2022 16:28 )             29.9     05-29    134<L>  |  94<L>  |  20  ----------------------------<  150<H>  3.7   |  28  |  5.05<H>    Ca    8.8      29 May 2022 16:28  Phos  3.0     05-29  Mg     2.2     05-29                  RADIOLOGY & ADDITIONAL TESTS:    Imaging Personally Reviewed:    Consultant(s) Notes Reviewed:      Care Discussed with Consultants/Other Providers:

## 2022-05-30 NOTE — CHART NOTE - NSCHARTNOTEFT_GEN_A_CORE
No need for heparin drip from a neurosurgical perspective. Patient can remain on Eliquis, Plavix, and Aspirin for cerebral angiogram, possible stent placement.

## 2022-05-30 NOTE — PROGRESS NOTE ADULT - SUBJECTIVE AND OBJECTIVE BOX
MRN-[id number]    Subjective:      PAST MEDICAL & SURGICAL HISTORY:  HTN (hypertension), benign      HLD (hyperlipidemia)      DM type 2 (diabetes mellitus, type 2)      TIA (transient ischemic attack)      Atrial fibrillation      MI (myocardial infarction)  circa 2014 and 2022.      CAD (coronary artery disease)      Neuropathy      Stage 3 chronic kidney disease      2019 novel coronavirus disease (COVID-19)  12/2021.  Received the COVID-19 vaccine x 2 as of April 2022.      Status post angioplasty with stent  LULU x 3 2/7/2014      S/P carotid endarterectomy  left        FAMILY HISTORY:  Family history of heart disease  father    Family history of CVA  mother      Social Hx:  Nonsmoker, no drug or alcohol use    Home Medications:  allopurinol 100 mg oral tablet: 1 tab(s) orally once a day (03 Apr 2022 06:34)  aspirin 81 mg oral delayed release tablet: 1 tab(s) orally once a day (03 Apr 2022 06:34)  bumetanide 2 mg oral tablet: 1 tab(s) orally once a day (03 Apr 2022 06:34)  insulin glargine 100 units/mL subcutaneous solution: 25 unit(s) subcutaneous once a day (at bedtime) (03 Apr 2022 06:34)  metOLazone 5 mg oral tablet: 1 tab(s) orally 3 times a week (03 Apr 2022 06:34)  metoprolol succinate 50 mg oral tablet, extended release: 2 tab(s) orally once a day (03 Apr 2022 06:34)  NovoLOG 100 units/mL subcutaneous solution: subcutaneous 4 times a day (before meals and at bedtime) (03 Apr 2022 06:34)  NovoLOG FlexPen 100 units/mL injectable solution: 10 unit(s) subcutaneous 3 times a day (03 Apr 2022 06:34)  oxycodone-acetaminophen 5 mg-325 mg oral tablet: 1 tab(s) orally 2 times a day (03 Apr 2022 06:34)  Pradaxa 150 mg oral capsule: 1 cap(s) orally 2 times a day (03 Apr 2022 06:34)  pregabalin 100 mg oral capsule: 1 cap(s) orally 3 times a day (03 Apr 2022 06:34)    MEDICATIONS  (STANDING):  allopurinol 100 milliGRAM(s) Oral daily  apixaban 5 milliGRAM(s) Oral two times a day  aspirin  chewable 81 milliGRAM(s) Oral daily  atorvastatin 40 milliGRAM(s) Oral at bedtime  chlorhexidine 4% Liquid 1 Application(s) Topical <User Schedule>  clopidogrel Tablet 75 milliGRAM(s) Oral daily  dextrose 50% Injectable 25 Gram(s) IV Push once  dextrose 50% Injectable 25 Gram(s) IV Push once  dextrose 50% Injectable 12.5 Gram(s) IV Push once  diltiazem    milliGRAM(s) Oral daily  insulin lispro (ADMELOG) corrective regimen sliding scale   SubCutaneous three times a day before meals  insulin lispro (ADMELOG) corrective regimen sliding scale   SubCutaneous at bedtime  lidocaine   4% Patch 1 Patch Transdermal daily  metoprolol succinate  milliGRAM(s) Oral daily  mupirocin 2% Ointment 1 Application(s) Both Nostrils two times a day  Nephro-reena 1 Tablet(s) Oral daily  ofloxacin 0.3% Solution 1 Drop(s) Right EYE four times a day  polyethylene glycol 3350 17 Gram(s) Oral two times a day  povidone iodine 10% Solution 1 Application(s) Topical daily  senna 2 Tablet(s) Oral at bedtime    MEDICATIONS  (PRN):  bisacodyl 5 milliGRAM(s) Oral every 12 hours PRN Constipation  dextrose Oral Gel 15 Gram(s) Oral once PRN Blood Glucose LESS THAN 70 milliGRAM(s)/deciliter  sodium chloride 0.9% lock flush 10 milliLiter(s) IV Push every 1 hour PRN Pre/post blood products, medications, blood draw, and to maintain line patency    Allergies  nitrofurantoin (Nephrotoxicity)    Intolerances      REVIEW OF SYSTEMS  General:	  Skin/Breast:	  Ophthalmologic:  ENMT:	  Respiratory and Thorax:	  Cardiovascular:	  Gastrointestinal:	  Genitourinary:	  Musculoskeletal:	  Neurological:	  Psychiatric:	  Hematology/Lymphatics:	  Endocrine:	  Allergic/Immunologic:	    ROS: Pertinent positives above, all other ROS were reviewed and are negative.      Vital Signs Last 24 Hrs  T(C): 36.7 (30 May 2022 22:08), Max: 36.7 (30 May 2022 12:41)  T(F): 98 (30 May 2022 22:08), Max: 98.1 (30 May 2022 12:41)  HR: 75 (30 May 2022 22:08) (75 - 94)  BP: 104/55 (30 May 2022 22:08) (104/55 - 167/75)  BP(mean): --  RR: 18 (30 May 2022 22:08) (18 - 19)  SpO2: 95% (30 May 2022 22:08) (94% - 97%)    GENERAL EXAM:  Constitutional: awake and alert. NAD  HEENT: PERRLA, EOMI  Neck: Supple  Respiratory: Breath sounds are clear bilaterally  Cardiovascular: S1 and S2, regular / irregular rhythm  Gastrointestinal: soft, nontender  Extremities: no edema, no cyanosis  Vascular: no carotid bruits  Musculoskeletal: no joint swelling/tenderness, no abnormal movements  Skin: no rashes    NEUROLOGICAL EXAM:  MS: AAOX3, fluent, attends b/l; recent and remote memory intact; normal attention, language and fund of knowledge.   CN: VFF, EOMI, PERRL, no ZOE, no APD,  V1-3 intact, no facial asymmetry, t/p midline, SCM/trap intact.  Eyes-Fundi: no papilledema.  Motor: Strength: 5/5 4x. Tone: normal. Bulk: normal. DTR 2+ symm.  Plantar flex b/l. Sensation: intact to LT/PP/Vibration/Position/Temperature 4x.   Coordination: intact 4x.   Gait:  Romberg negative, pull test negative; walks with narrow base, pivots in 2 steps.    NIHSS  mRS    Labs:   cbc                      7.1    17.38 )-----------( 412      ( 30 May 2022 22:40 )             24.1     [id number]    134<L>  |  94<L>  |  20  ----------------------------<  150<H>  3.7   |  28  |  5.05<H>    Ca    8.8      29 May 2022 16:28  Phos  3.0     05-29  Mg     2.2     05-29      Coags  Lipids  A1C  Cardiac Markers      UA  CSF  Immunological Labs    Radiology: [id number]    Subjective: 68 yo male seen and examined at bedside. RRT called on 5/30 @ 22:24 for acute mental status changes. Patient noted to be hypotensive low 100s/high 90s and hypoxic to 80s during RRT. Rapid team escalated to code stroke due to concern for R facial droop. LKW 5/30 @ 1900 per primary team at bedside. However, they note patient had been more lethargic throughout the day today. On evaluation by neurology, patient groaning but no other verbal output, not following commands, no spontaneous movement/not withdrawing extremities. Upon repeat evaluation after code stroke imaging was obtained, patient slightly more arousable and withdrawing extremities (L>R). Not a candidate for tPA as patient had recent infarcts seen on 05/11 MRI. Not a candidate for MT as no LVO on imaging.     NIHSS 26 -> 22  MRS 4    PAST MEDICAL & SURGICAL HISTORY:  HTN (hypertension), benign    HLD (hyperlipidemia)    DM type 2 (diabetes mellitus, type 2)    TIA (transient ischemic attack)    Atrial fibrillation    MI (myocardial infarction)  circa 2014 and 2022.    CAD (coronary artery disease)    Neuropathy    Stage 3 chronic kidney disease    2019 novel coronavirus disease (COVID-19)  12/2021.  Received the COVID-19 vaccine x 2 as of April 2022.    Status post angioplasty with stent  LULU x 3 2/7/2014    S/P carotid endarterectomy  left    FAMILY HISTORY:  Family history of heart disease  father    Family history of CVA  mother    Social Hx:  Nonsmoker, no drug or alcohol use    Home Medications:  allopurinol 100 mg oral tablet: 1 tab(s) orally once a day (03 Apr 2022 06:34)  aspirin 81 mg oral delayed release tablet: 1 tab(s) orally once a day (03 Apr 2022 06:34)  bumetanide 2 mg oral tablet: 1 tab(s) orally once a day (03 Apr 2022 06:34)  insulin glargine 100 units/mL subcutaneous solution: 25 unit(s) subcutaneous once a day (at bedtime) (03 Apr 2022 06:34)  metOLazone 5 mg oral tablet: 1 tab(s) orally 3 times a week (03 Apr 2022 06:34)  metoprolol succinate 50 mg oral tablet, extended release: 2 tab(s) orally once a day (03 Apr 2022 06:34)  NovoLOG 100 units/mL subcutaneous solution: subcutaneous 4 times a day (before meals and at bedtime) (03 Apr 2022 06:34)  NovoLOG FlexPen 100 units/mL injectable solution: 10 unit(s) subcutaneous 3 times a day (03 Apr 2022 06:34)  oxycodone-acetaminophen 5 mg-325 mg oral tablet: 1 tab(s) orally 2 times a day (03 Apr 2022 06:34)  Pradaxa 150 mg oral capsule: 1 cap(s) orally 2 times a day (03 Apr 2022 06:34)  pregabalin 100 mg oral capsule: 1 cap(s) orally 3 times a day (03 Apr 2022 06:34)    MEDICATIONS  (STANDING):  allopurinol 100 milliGRAM(s) Oral daily  apixaban 5 milliGRAM(s) Oral two times a day  aspirin  chewable 81 milliGRAM(s) Oral daily  atorvastatin 40 milliGRAM(s) Oral at bedtime  chlorhexidine 4% Liquid 1 Application(s) Topical <User Schedule>  clopidogrel Tablet 75 milliGRAM(s) Oral daily  dextrose 50% Injectable 25 Gram(s) IV Push once  dextrose 50% Injectable 25 Gram(s) IV Push once  dextrose 50% Injectable 12.5 Gram(s) IV Push once  diltiazem    milliGRAM(s) Oral daily  insulin lispro (ADMELOG) corrective regimen sliding scale   SubCutaneous three times a day before meals  insulin lispro (ADMELOG) corrective regimen sliding scale   SubCutaneous at bedtime  lidocaine   4% Patch 1 Patch Transdermal daily  metoprolol succinate  milliGRAM(s) Oral daily  mupirocin 2% Ointment 1 Application(s) Both Nostrils two times a day  Nephro-reena 1 Tablet(s) Oral daily  ofloxacin 0.3% Solution 1 Drop(s) Right EYE four times a day  polyethylene glycol 3350 17 Gram(s) Oral two times a day  povidone iodine 10% Solution 1 Application(s) Topical daily  senna 2 Tablet(s) Oral at bedtime    MEDICATIONS  (PRN):  bisacodyl 5 milliGRAM(s) Oral every 12 hours PRN Constipation  dextrose Oral Gel 15 Gram(s) Oral once PRN Blood Glucose LESS THAN 70 milliGRAM(s)/deciliter  sodium chloride 0.9% lock flush 10 milliLiter(s) IV Push every 1 hour PRN Pre/post blood products, medications, blood draw, and to maintain line patency    Allergies  nitrofurantoin (Nephrotoxicity)    ROS: Unable to obtain due to mental status.     Vital Signs Last 24 Hrs  T(C): 36.7 (30 May 2022 22:08), Max: 36.7 (30 May 2022 12:41)  T(F): 98 (30 May 2022 22:08), Max: 98.1 (30 May 2022 12:41)  HR: 75 (30 May 2022 22:08) (75 - 94)  BP: 104/55 (30 May 2022 22:08) (104/55 - 167/75)  RR: 18 (30 May 2022 22:08) (18 - 19)  SpO2: 95% (30 May 2022 22:08) (94% - 97%)    GENERAL EXAM:  Constitutional: Lying in bed, NAD.  Head: normocephalic, atraumatic.  Extremities: Venous stasis changes in lower extremities.     NEUROLOGICAL EXAM:  MS: Eyes closed. Do not open to verbal/noxious stimuli. No verbal output. Does not follow commands.  CN: PERRL. BTT intact bilaterally. Horizontal EOMI. ?Mild R nasolabial flattening, overcomes with grimace.  Motor: No spontaneous movement. Does not withdraw to noxious stimuli x4.  Sensory: No grimace to noxious stimuli in upper/lower extremities. Grimaces to sternal rub and TMJ pressure.  Reflexes: Trace TF in lower extremities.  Coordination/Gait: Unable to assess.    REPEAT NEUROLOGICAL EXAM: (done after code stroke imaging obtained)  MS: Eyes closed. Open to voice. Requires repeated stimulation to keep eyes open. No verbal output, does not follow commands.  CN: PERRL. BTT intact bilaterally. Horizontal EOMI. ?Mild R nasolabial flattening, overcomes with grimace.  Motor: RUE/RLE no effort against gravity. LUE/LLE some effort against gravity.  Sensory: Grimaces to noxious stimuli x4.  Reflexes: No TF in lower extremities.  Coordination/Gait: Unable to assess.    Labs:   cbc                      7.1    17.38 )-----------( 412      ( 30 May 2022 22:40 )             24.1     [id number]    134<L>  |  94<L>  |  20  ----------------------------<  150<H>  3.7   |  28  |  5.05<H>    Ca    8.8      29 May 2022 16:28  Phos  3.0     05-29  Mg     2.2     05-29    PTT - ( 30 May 2022 22:40 )  PTT:34.1 sec    Radiology:  -05/14 MRI Head w/o: Small acute infarctions in the BILATERAL posterior centrum semiovale and LEFT corona radiata and LEFT posterior periventricular white matter with restricted diffusion. Mild global atrophy most prominent within the BILATERAL cerebellum.  -05/30 CTH: Multiple small acute bilateral frontoparietal cortical, subcortical, periventricular white matter infarcts again noted but better seen on MRI from 5/14/2022. No evidence of hemorrhagic transformation.  -05/30 CT PERFUSION: CT perfusion data is incorrect due to patient motion. RAPID data processing failed secondary to no suitable AIF location found.  -05/30 CTA NECK: Occlusion of the left internal carotid artery just distal to the origin as seen on prior exam. Short segment severe stenoses of the mid to distal common carotid arteries bilaterally (right worse than left). Patent cervical vertebral arteries.  -05/30 CTA BRAIN: Skull base, petrous, cavernous segments of the left internal carotid artery are occluded with reconstitution of flow in the supraclinoid segment. Atherosclerotic calcifications of the cavernous and clinoid segments of the internal carotid arteries with resultant moderate stenoses. Short segment mild stenoses of the basilar artery and short segment mild stenoses of the proximal PCAs.

## 2022-05-30 NOTE — PROGRESS NOTE ADULT - ASSESSMENT
·  Problem:[email or web address].co hemodialysis per renal  to cont as per renal       Problem/Plan - 2:  ·  Problem: Chronic atrial fibrillation. c/w a/c  cards f/u      Problem/Plan - 3:  ·  Problem: Cystitis.   ID follow up appreciated        Problem/Plan - 4:  ·  Problem: Type 2 diabetes mellitus. c/w insulin   endo f/u      Problem/Plan - 5:  ·  Problem: CAD (coronary artery disease).   rapid a fib better  meds adjusted  hr stable  c/w a/c       Persistent encephalopathy   neuro follow up   f/u labs   mental status better  polypharmacy contributing and volume status   minimizing meds     carotid duplex with stenosis   cta noted  mra/ mri noted  tx plans as per vasc / neuro   neuro f/u noted  neuro sx cancelled   wed / spoke w/ neuro   Angiogram pending   Eliquis to continue as per NSx.    pl effusion   cxr monday and possible tap tuesday if not better     No acute medical condition identified to postpone planned angiogram.     d/w wife at bedside    Javier Arce MD phone [phone]

## 2022-05-30 NOTE — RAPID RESPONSE TEAM SUMMARY - NSSITUATIONBACKGROUNDRRT_GEN_ALL_CORE
67 year old gentleman with PMH of CAD, NSTEMI s/p 3 stents in 2014 (stents to LAD, proximal and distal LCx), ischemic cardiomyopathy, HTN for 10 years, T2DM for 26 years c/b peripheral neuropathy, CVA, TIA, Afib on Pradaxa, chronic RLE wound, L carotid stenosis s/p endarterectomy (2014) just recently admitted  to the [facility] on 2/19/2022 with acute onset chest pain for one day found to have an NSTEMI, CAD, s/p PCI in setting of increased AF rates off bb for 3 days and resolved chest pain, his CKMB peaked and Echo noted with EF 60%,normal LV function, mild TR, mild UT. He was s/p [facility] with 85% proximal LAD s/p balloon angioplasty and LULU. He presented to [facility] ED with decreased urine output over the week. [facility]. Stevens went to [facility] for hematuria and was started  on Nitrofurantoin. Pt is still taking Nitrofurantoin w/ 5 days left. He came to the ED due to worsening symptoms. Found to be in acute renal failure and started on HD.     RRT called for unresponsiveness

## 2022-05-30 NOTE — PROGRESS NOTE ADULT - ASSESSMENT
Echo 5/13/22:  1. Normal left ventricular internal dimensions and wall  thicknesses.  2. Endocardial visualization enhanced with intravenous  injection of Ultrasonic Enhancing Agent (Lumason). Grossly  borderline normal left ventricular systolic function; no  obvious segmental wall motion abnormality.  3. The right ventricle is not well visualized.  *** Compared with echocardiogram of 2/22/2022, no  significant changes noted.      A/P    68 y/o M with history of CAD, s/p multiple PCI, with most recent 2/22 PCI of LAD in setting of NSTEMI, on ASA only (pradaxa), chronic atrial fibrillation maintained on Pradaxa, essential HTN, type 2 DM previously on oral medications but on insulin, diabetic neuropathy with chronic RIGHT LE venous stasis changes>left, LEFT foot ulcer seen by podiatry, past endarterectomy LEFT CEA in 2014 presenting with 1 week of decreased urine output, cystitis with hematuria     #ANT on CKD, new HD  -oliguric renal failure in setting of UTI/cystitis  -New HD for uremia, renal failure  -s/p L AVG 4/18, s/p permacath placement 4/20  -renal f/ u    #AMS/Lethargy  -ams likely from pain meds, embolic cva  -repeat CT 4/27 unremarkable  -MRI 5/11 revealed small acute infarctions in bilateral posterior centrum semiovale and left corona radiata and left posterior periventricular white matter  -repeat echo with no interval changes  -neuro following     #Acute on chronic HFpEF  -stable  -continue volume removal with HD  -c/w bb  -repeat echo with stable borderline lv fxn    #Chronic AF  -rates stable  -c/w cardizem 240 mg daily   -c/w metoprolol succ 100 mg qd  -c/w eliquis 5 mg BID for now - heme stable     #HTN  -stable  -c/w meds     #CAD, s/p PCI, most recent 2/2022  -stable, cont asa, plavix   -statin     #carotid stenosis   -previous MRA  noted with Greater than 75% stenosis of the right distal common carotid artery. Approximately 60% stenosis of the proximal left internal carotid artery.  -carotid dopplers 5/11 noted :  There is new occlusion of the left internal carotid artery;  greater than 70% stenosis involving the distal right common carotid artery as was seen previously. Mild to moderate flow-limiting stenosis is seen at the left external   carotid artery.  -CTa head and neck 5/12 noted  -Continue ASA/plavix, a/c  -await carotid angio, poss carotid stenting wed by neurosx  -patient remains cardiac optimized to proceed wit5h acceptable cardiac risk  -neuro sx ok with paula kamara on board for cerebral angio     d/w acp    45 minutes spent on total encounter; more than 50% of the visit was spent counseling and/or coordinating care by the attending physician.

## 2022-05-30 NOTE — RAPID RESPONSE TEAM SUMMARY - NSADDTLFINDINGSRRT_GEN_ALL_CORE
Upon arrival to room, patient with eyes open but only responding to pain. VS sig for hypoxia requiring NRB, afib rhythm in 80's. BP stable. afebrile. . R sided facial droop with eyelid droop noted, code stroke called. Full set of labs sent including coags, blood gas, and cardiac enzymes. Already on ASA, plavix, eliquis. As per neuro, r/o stroke CT imaging. For now pt can stay at current LOC

## 2022-05-30 NOTE — CHART NOTE - NSCHARTNOTEFT_GEN_A_CORE
RN to PA- Patient is very lethargic with a change from baseline. Pt seen at bedside, with poor response and lethargic. Fingerstick 211 and patient had not received any sedating drugs earlier to event. RRT called for lethargy/AMS. CODE STROKE called for right facial droop. To r/o stroke, [facility] ordered STAT.     Family, daughter and wife were called and notified of events. Will relay results to family overnight.   Code status: Full code.  Left message for attending, Dr. Corral. Awaiting call back.     Vital Signs Last 24 Hrs  T(C): 36.7 (30 May 2022 22:08), Max: 36.7 (30 May 2022 12:41)  T(F): 98 (30 May 2022 22:08), Max: 98.1 (30 May 2022 12:41)  HR: 75 (30 May 2022 22:08) (75 - 94)  BP: 104/55 (30 May 2022 22:08) (104/55 - 167/75)  BP(mean): --  RR: 18 (30 May 2022 22:08) (18 - 19)  SpO2: 95% (30 May 2022 22:08) (94% - 97%)                          7.1    17.38 )-----------( 412      ( 30 May 2022 22:40 )             24.1   05-30    133<L>  |  95<L>  |  64<H>  ----------------------------<  208<H>  4.2   |  23  |  6.75<H>    Ca    8.4      30 May 2022 22:40  Phos  2.7     05-30  Mg     2.1     05-30    TPro  6.1  /  Alb  2.9<L>  /  TBili  0.4  /  DBili  x   /  AST  12  /  ALT  <5<L>  /  AlkPhos  121<H>  05-30 RN to PA- Patient is very lethargic with a change from baseline. Pt seen at bedside, with poor response and lethargic. Fingerstick 211 and patient had not received any sedating drugs earlier to event. RRT called for lethargy/AMS. CODE STROKE called for right facial droop. To r/o stroke, [facility] ordered STAT.     Family, daughter and wife were called and notified of events. Will relay results to family overnight.   Code status: Full code  Attending covering for Dr. Corral, Dr. Arce aware of overnight events.      Vital Signs Last 24 Hrs  T(C): 36.7 (30 May 2022 22:08), Max: 36.7 (30 May 2022 12:41)  T(F): 98 (30 May 2022 22:08), Max: 98.1 (30 May 2022 12:41)  HR: 75 (30 May 2022 22:08) (75 - 94)  BP: 104/55 (30 May 2022 22:08) (104/55 - 167/75)  BP(mean): --  RR: 18 (30 May 2022 22:08) (18 - 19)  SpO2: 95% (30 May 2022 22:08) (94% - 97%)                          7.1    17.38 )-----------( 412      ( 30 May 2022 22:40 )             24.1   05-30    133<L>  |  95<L>  |  64<H>  ----------------------------<  208<H>  4.2   |  23  |  6.75<H>    Ca    8.4      30 May 2022 22:40  Phos  2.7     05-30  Mg     2.1     05-30    TPro  6.1  /  Alb  2.9<L>  /  TBili  0.4  /  DBili  x   /  AST  12  /  ALT  <5<L>  /  AlkPhos  121<H>  05-30      Selena Soriano PA-C  [id number]

## 2022-05-30 NOTE — STROKE CODE NOTE - IV ALTEPLASE EXCL REL HIDDEN
If he'd like, he can increase the flecainide to 75 mg BID before his OV with me.  Otherwise, continue 50 mg BID and will see as planned.    * If he increases flecainide, pls get EKG 3-5 days after starting it ... he may want to delay starting it until he's 5 days away from my visit ...    [place]   show

## 2022-05-31 NOTE — PROGRESS NOTE ADULT - SUBJECTIVE AND OBJECTIVE BOX
SUMMARY:    OVERNIGHT EVENTS: RRT called for R facial and altered mental status  CTH/CTA unchanged, CPT uninterpreted     REVIEW OF SYSTEMS: [x] Unable to Assess due to neurologic exam   [ ] All ROS addressed below are non-contributory, except:  Neuro: [ ] Headache [ ] Back pain [ ] Numbness [ ] Weakness [ ] Ataxia [ ] Dizziness [ ] Aphasia [ ] Dysarthria [ ] Visual disturbance  Resp: [ ] Shortness of breath/dyspnea [ ] Orthopnea [ ] Cough  CV: [ ] Chest pain [ ] Palpitation [ ] Lightheadedness [ ] Syncope  Renal: [ ] Thirst [ ] Edema  GI: [ ] Nausea [ ] Emesis [ ] Abdominal pain [ ] Constipation [ ] Diarrhea  Hem: [ ] Hematemesis [ ] bBright red blood per rectum  ID: [ ] Fever [ ] Chills [ ] Dysuria  ENT: [ ] Rhinorrhea  VITALS: [] Reviewed    IMAGING/DATA: [x] Reviewed    IVF FLUIDS/MEDICATIONS: [x] Reviewed    ALLERGIES: Allergies    nitrofurantoin (Nephrotoxicity)    Intolerances    DEVICES:   [] Restraints [x] PIVs [] ET tube [] central line [] PICC [x] arterial line [] keyes [] NGT/OGT [] EVD [] LD [] MUNDO/HMV [] LiCOX [] ICP monitor [] Trach [] PEG [] Chest Tube [] other:    EXAMINATION:  General: No acute distress  HEENT: Anicteric sclerae  Cardiac: S1S2 irregularly irregular   Lungs: decreased at bases   Abdomen: Soft, non-tender, +BS  Extremities: No c/c/e  Skin/Incision Site: Clean, dry and intact  Neurologic: *** SUMMARY: 68yo man adm on 4/3, started on HD. Earlier this evening, Neurosurgery notified about RRT/code stroke called for unresponsiveness around 10:30pm 5/30, was noted to be hypotensive low 100s/high 90s and hypoxic to 80s during RRT and aphasic w/ R facial. CT/CTA appears stable from previous, persistent L ICA occlusion and severe R ICA stenosis, no new LVO, CTP uninterpretable. NIHSS now 18, Exam: EOV, Ox0, no FC, R nl fold flat, RUE w/d in plane of bed, LUE ag, RLE trace w/d, LLE w/d ag. Creatinine prior to CTA/CTP was 6.75 (inc from 5.0 yesterday), given now s/p large contrast bolus, consider uremic encephalopathy and/or fluid overload contributing to hypoxia and AMS, would consider urgent dialysis. Additionally, given b/l carotid pathology, would recommend trial of BP augmentation with goal SBP>120 given exam potentially consistent with L MCA hypoperfusion.     PMH of pleural effusion (2019 s/p R thoracentesis, then R CT placement with course c/b R hydroPTX - tx to [facility] for possible VATs decortication which was deferred), CAD, NSTEMI s/p stents (2014 stents to LAD, proximal and distal LCx, and additional LULU to proximal LAD 2/2022), ischemic cardiomyopathy, HTN, T2DM c/b peripheral neuropathy, CVA, TIA, Afib on Pradaxa, chronic RLE wound, L carotid stenosis s/p endarterectomy (2014).    OVERNIGHT EVENTS: RRT called for R facial and altered mental status  CTH/CTA unchanged, CPT uninterpreted     REVIEW OF SYSTEMS: [x] Unable to Assess due to neurologic exam   [ ] All ROS addressed below are non-contributory, except:  Neuro: [ ] Headache [ ] Back pain [ ] Numbness [ ] Weakness [ ] Ataxia [ ] Dizziness [ ] Aphasia [ ] Dysarthria [ ] Visual disturbance  Resp: [ ] Shortness of breath/dyspnea [ ] Orthopnea [ ] Cough  CV: [ ] Chest pain [ ] Palpitation [ ] Lightheadedness [ ] Syncope  Renal: [ ] Thirst [ ] Edema  GI: [ ] Nausea [ ] Emesis [ ] Abdominal pain [ ] Constipation [ ] Diarrhea  Hem: [ ] Hematemesis [ ] bBright red blood per rectum  ID: [ ] Fever [ ] Chills [ ] Dysuria  ENT: [ ] Rhinorrhea  VITALS: [] Reviewed    IMAGING/DATA: [x] Reviewed    IVF FLUIDS/MEDICATIONS: [x] Reviewed    ALLERGIES: Allergies    nitrofurantoin (Nephrotoxicity)    Intolerances    DEVICES:   [] Restraints [x] PIVs [] ET tube [] central line [] PICC [x] arterial line [] keyes [] NGT/OGT [] EVD [] LD [] MUNDO/HMV [] LiCOX [] ICP monitor [] Trach [] PEG [] Chest Tube [] other:    EXAMINATION:  General: No acute distress  HEENT: Anicteric sclerae  Cardiac: S1S2 irregularly irregular   Lungs: decreased at bases   Abdomen: Soft, non-tender, +BS  Extremities: No c/c/e  Skin/Incision Site: Clean, dry and intact  Neurologic: EOV, Ox0, no FC, R nl fold flat, RUE w/d in plane of bed, LUE ag, RLE trace w/d, LLE w/d ag

## 2022-05-31 NOTE — PROGRESS NOTE ADULT - SUBJECTIVE AND OBJECTIVE BOX
Developed cerebral hypoperfusion last night when blood pressure decreased  CTA last night without large vessel occlusion  Transferred to NICU,  Improved with blood pressure management  Had second episode of hypoperfusion during sedation for the MRI  MRI this am with few new punctate infarcts    On exam, he is disoriented, unable to follow commands. Mild R sided weakness and numbness.    His clinical picture has been complicated by GI bleed requiring transfusion. Plavix has been held.  Uremic encephalopathy.    At this juncture, it's evident that he is at risk of major stroke due to cerebral hypoperfusion (severe carotid stenosis on the R, carotid occlusion on the L). Given his medical comorbidities, low cerebrovascular reserve, and GI hemorrhage on triple therapy he is high risk patient for MITESH and CEA.    The priority now is to manage the GI hemorrhage (endoscopy tomorrow) and maintain cerebral perfusion.    I had a long discussion with Dr. Pinto regarding the possibility of CEA.    I also had a long discussion with the patient's daughter and explained the situation in detail, as well as the plan. The patient's wife was also updated.   Developed cerebral hypoperfusion last night when blood pressure decreased  CTA last night without large vessel occlusion  Transferred to NICU,  Improved with blood pressure management  Had second episode of hypoperfusion during sedation for the MRI  MRI this am with few new punctate infarcts    On exam, he is disoriented, unable to follow commands. Mild R sided weakness and numbness.    His clinical picture has been complicated by GI bleed requiring transfusion. Plavix has been held.  Uremic encephalopathy.    At this juncture, it's evident that he is at risk of major stroke due to cerebral hypoperfusion (severe carotid stenosis on the R, carotid occlusion on the L). Given his medical comorbidities, low cerebrovascular reserve, and GI hemorrhage on triple therapy he is high risk patient for MITESH and CEA.    The priority now is to manage the GI hemorrhage (endoscopy tomorrow) and maintain cerebral perfusion.    I had a long discussion with Dr. Pinto regarding the possibility of CEA.    I also had a long discussion with the patient's daughter and explained the situation in detail, as well as the plan. The patient's wife was also updated.    PLAN  Maintain SBP >120  Stop Plavix  UGI endoscopy tomorrow  Consideration for CEA or MITESH after GI endoscopy

## 2022-05-31 NOTE — PROGRESS NOTE ADULT - ATTENDING COMMENTS
66 yo man with h/o HTN, DM (HgA1C 10.1), ESRD on HD, CAD s/p LULU to LAD, Afib on Pradaxa, diabetic neuropathy with chronic RIGHT LE venous stasis changes>left, LEFT foot ulcer seen by podiatry, past endarterectomy LEFT CEA in 2014, admitted 4/3 with worsening oliguria, proceeded to requiring HD this admission, continued to have poor mental status (speakin a few words, following onyly simple commands) with MRI 5/14 showing b/l watershed strokes.     persistent L ICA occlusion and severe R ICA stenosis,   NIHSS now 18, Exam: EOV, Ox0, no FC, R nl fold flat, RUE w/d in plane of bed, LUE ag, RLE trace w/d, LLE w/d ag. Creatinine prior to CTA/CTP was 6.75 (inc from 5.0 yesterday), given now s/p large contrast bolus, consider uremic encephalopathy and/or fluid overload contributing to hypoxia and AMS, would consider urgent dialysis. Additionally, given b/l carotid pathology, would recommend trial of BP augmentation with goal SBP>120 given exam potentially consistent with L MCA hypoperfusion.    On exam, opens eyes to voce, briefly attends, moans but nonverbal, does not follow commands, EOMI, no BTT on the R, with BTT on the L, R arm withdraws 2/5 to noxious, L arm localizes AG, R leg withdraws 1-2/5,  leg 2/5    c/w ASA, plavix and Pradaxa  c/w atorvastatin  -180  metoprolol 12.5 mg BID  PPI BID for melena and Carafate   NPO for possible carotid stent today    Patient is critically ill due to ? and at high risk for neurological deterioration or death due to: 66 yo man with h/o HTN, DM (HgA1C 10.1), ESRD on HD, CAD s/p LULU to LAD, Afib on Pradaxa, diabetic neuropathy with chronic R LE venous stasis changes>left, prior endarterectomy L CEA in 2014, on ASA and Pradaxa at home (no Plavx), admitted 4/3 with worsening oliguria, proceeded to requiring HD this admission, continued to have poor mental status (speaking a few words, following only simple commands) with MRI 5/14 showing b/l watershed strokes, with L ICA occlusion and severe R ICA stenosis, for which he was started on Plavix in addition to ASA and Pradaxa. With hypotension, hypoxia and new R sided weakness and global aphasia on 5/30, with CT/CTAP stable.    On exam, opens eyes to voice, briefly attends, moans but nonverbal, does not follow commands, EOMI, no BTT on the R, with BTT on the L, R arm withdraws 2/5 to noxious, L arm localizes AG, R leg withdraws 1-2/5,  leg 2/5    with an episode of melena this Am, repeat Hg 6.4.   With a brief episode of hypotension in MRI today 2/2 Precedex.     On review of MRI done today, patient with     c/w ASA, plavix and Pradaxa  c/w atorvastatin  -180  metoprolol 12.5 mg BID  PPI BID for melena and Carafate   NPO for possible carotid stent today    Patient is critically ill due to ? and at high risk for neurological deterioration or death due to: 66 yo man with h/o HTN, DM (HgA1C 10.1), ESRD on HD, CAD s/p LULU to LAD, Afib on Pradaxa, diabetic neuropathy with chronic R LE venous stasis changes>left, prior endarterectomy L CEA in 2014, on ASA and Pradaxa at home (no Plavx), admitted 4/3 with worsening oliguria, proceeded to requiring HD this admission, continued to have poor mental status (speaking a few words, following only simple commands) with MRI 5/14 showing b/l watershed strokes, with L ICA occlusion and severe R ICA stenosis, for which he was started on Plavix in addition to ASA and Pradaxa. With hypotension, hypoxia and new R sided weakness and global aphasia on 5/30, with CT/CTAP stable.    On exam, opens eyes to voice, briefly attends, moans but nonverbal, does not follow commands, EOMI, no BTT on the R, with BTT on the L, R arm withdraws 2/5 to noxious, L arm localizes AG, R leg withdraws 1-2/5,  leg 2/5    with an episode of melena this Am, repeat Hg 6.4.   With a brief episode of hypotension in MRI today 2/2 Precedex. Back to above baseline post MRI.     On review of MRI done today, patient with new watershed strokes.     I discussed the case with Dr. Nolan from neuroIR and vascular surgery.     c/w ASA, plavix and Pradaxa  c/w atorvastatin  SBP strictly 120-180  metoprolol 12.5 mg BID  PPI BID for melena and Carafate, GI foowing  NPO for possible carotid stent today    Patient is critically ill due to ? and at high risk for neurological deterioration or death due to: 66 yo man with h/o HTN, DM (HgA1C 10.1), ESRD on HD, CAD s/p LULU to LAD, Afib on Pradaxa, diabetic neuropathy with chronic R LE venous stasis changes>left, prior endarterectomy L CEA in 2014, on ASA and Pradaxa at home (no Plavx), admitted 4/3 with worsening oliguria, proceeded to requiring HD this admission, continued to have poor mental status (speaking a few words, following only simple commands) with MRI 5/14 showing b/l watershed strokes, with L ICA occlusion and severe R ICA stenosis, for which he was started on Plavix in addition to ASA and Pradaxa. With hypotension, hypoxia and new R sided weakness and global aphasia on 5/30, with CT/CTAP stable.    On exam, opens eyes to voice, briefly attends, moans but nonverbal, does not follow commands, EOMI, no BTT on the R, with BTT on the L, R arm withdraws 2/5 to noxious, L arm localizes AG, R leg withdraws 1-2/5,  leg 2/5    with an episode of melena this Am, repeat Hg 6.4.   With a brief episode of hypotension in MRI today 2/2 Precedex. Back to above baseline post MRI.     On review of MRI done today, patient with new watershed strokes.     I discussed the case with Dr. Nolan from neuroIR and vascular surgery who will discuss CEA vs stent with family.     c/w ASA and Pradaxa, stop Plavix as per neurosurgery  c/w atorvastatin  SBP strictly 120-180  metoprolol 12.5 mg BID  PPI BID for melena and Carafate, GI following, recommending an EGD tomorrow   NPO for GI bleed  transfuse for Hg>7    Patient is critically ill due to symptomatic carotid stenosis and Upper GI hemorrhage and at high risk for neurological deterioration or death due to: symptomatic carotid stenosis at high risk for strokes and Upper GI hemorrhage requiring pRBC transfusions

## 2022-05-31 NOTE — CHART NOTE - NSCHARTNOTEFT_GEN_A_CORE
Neurosurgery notified about RRT/code stroke called for unresponsiveness around 10:30pm 5/30, was noted to be hypotensive low 100s/high 90s and hypoxic to 80s during RRT and aphasic w/ R facial. CT/CTA appears stable from previous, persistent L ICA occlusion and severe R ICA stenosis, no new LVO, CTP uninterpretable. NIHSS now 18, Exam: EOV, Ox0, no FC, R nl fold flat, RUE w/d in plane of bed, LUE ag, RLE trace w/d, LLE w/d ag. Creatinine prior to CTA/CTP was 6.75 (inc from 5.0 yesterday), given now s/p large contrast bolus, consider uremic encephalopathy and/or fluid overload contributing to hypoxia and AMS, would consider urgent dialysis. Additionally, given b/l carotid pathology, would recommend trial of BP augmentation with goal SBP>120. Needs new MRI brain and NOVA with neck in AM. Continue ASA/Plavix/Eliquis.    Will still plan for angio/possible R carotid stenting wednesday if further medical workup demonstrates no contraindications. Neurosurgery notified about RRT/code stroke called for unresponsiveness around 10:30pm 5/30, was noted to be hypotensive low 100s/high 90s and hypoxic to 80s during RRT and aphasic w/ R facial. CT/CTA appears stable from previous, persistent L ICA occlusion and severe R ICA stenosis, no new LVO, CTP uninterpretable. NIHSS now 18, Exam: EOV, Ox0, no FC, R nl fold flat, RUE w/d in plane of bed, LUE ag, RLE trace w/d, LLE w/d ag. Creatinine prior to CTA/CTP was 6.75 (inc from 5.0 yesterday), given now s/p large contrast bolus, consider uremic encephalopathy and/or fluid overload contributing to hypoxia and AMS, would consider urgent dialysis. Additionally, given b/l carotid pathology, would recommend trial of BP augmentation with goal SBP>120. Needs new MRI brain and if possible to coordinate with dialysis, NOVA with neck in AM. Continue ASA/Plavix/Eliquis.    Will still plan for angio/possible R carotid stenting wednesday if further medical workup demonstrates no contraindications. Neurosurgery notified about RRT/code stroke called for unresponsiveness around 10:30pm 5/30, was noted to be hypotensive low 100s/high 90s and hypoxic to 80s during RRT and aphasic w/ R facial. CT/CTA appears stable from previous, persistent L ICA occlusion and severe R ICA stenosis, no new LVO, CTP uninterpretable. NIHSS now 18, Exam: EOV, Ox0, no FC, R nl fold flat, RUE w/d in plane of bed, LUE ag, RLE trace w/d, LLE w/d ag. Creatinine prior to CTA/CTP was 6.75 (inc from 5.0 yesterday), given now s/p large contrast bolus, consider uremic encephalopathy and/or fluid overload contributing to hypoxia and AMS, would consider urgent dialysis. Additionally, given b/l carotid pathology, would recommend trial of BP augmentation with goal SBP>120 given exam potentially consistent with L MCA hypoperfusion. Needs new MRI brain and if possible to coordinate with dialysis, NOVA with neck in AM. Continue ASA/Plavix/Eliquis.    Will still plan for angio/possible R carotid stenting wednesday if further medical workup demonstrates no contraindications.

## 2022-05-31 NOTE — PROGRESS NOTE ADULT - ASSESSMENT
ASSESSMENT/PLAN:    NEURO:  ?TIA ?hypoperfusion ?encephalopathy   neuro IR following  cont ASA/plavix/Eliquis  BP augmentation -180    Activity: [] mobilize as tolerated [x] Bedrest [] PT [] OT [] PMNR    PULM: monitor respiratory status  aspiration precaution   CXR    CV:   CAD--hold cardizem, d/c 100mg toprol XL keep metoprolol 12.5mg bid, if requiring onesimo, then hold that also  arterial line placement now  cont atplt/ac as above, cont statin   SBP goal 120-180  phenylephrine prn   check trops, BNP    RENAL:  Fluids: IVL   ESRD on HD, got CT contrast overnight, renal aware    GI:  Diet: NPO  needs NGT placement   GI prophylaxis [x] not indicated [] PPI [] other:  Bowel regimen [] colace [x] senna [x] other: miralax    ENDO:   Goal euglycemia (-180)    HEME/ONC:  VTE prophylaxis: [x] SCDs [x] chemoprophylaxis on Eliquis [] hold chemoprophylaxis due to: [] high risk of DVT/PE on admission due to:  downtrending H/H, f/u hemolysis panel, FOBT    ID: hypothermic earlier today, worsening leukocytosis over last few days  UA/ucx/bcx/CXR now  proc     MISC:     SOCIAL/FAMILY:  [] awaiting [x] updated regarding transfer [] family meeting    CODE STATUS:  [x] Full Code [] DNR [] DNI [] Palliative/Comfort Care    DISPOSITION:  [x] ICU [] Stroke Unit [] Floor [] EMU [] RCU [] PCU    [x] Patient is at high risk of neurologic deterioration/death due to: sepsis due to unknown organism, hypotension, cerebral hypoperfusion, acute stroke, altered mental status, respiratory failure requiring intubation     Contact: [phone]   ASSESSMENT/PLAN:    NEURO:  Neuro Checks Q 1 hr  neuro IR following  cont ASA/plavix/Eliquis  Possible angio and stenting today   Vascular surgery consult for CEA      PULM:   monitor respiratory status  aspiration precaution   CXR    CV:   CAD--hold cardizem, d/c 100mg toprol XL keep metoprolol 12.5mg bid, if requiring onesimo, then hold that also  arterial line placement now  cont atplt/ac as above, cont statin   SBP goal 120-180  phenylephrine prn   check trops, BNP    RENAL:  Fluids: IVL   ESRD on HD, got CT contrast overnight, renal aware    GI:  Diet: NPO  needs NGT placement   Melena  Upper GI endoscopy   Pantoprazole 40 mg BID     Bowel regimen [] colace [x] senna [x] other: miralax    ENDO:   Goal euglycemia (-180)    HEME/ONC:  Hgb drop  PRBC transfusion  VTE prophylaxis: [x] SCDs     ID:   Monitor for fever     SOCIAL/FAMILY:  [] awaiting [x] updated regarding transfer [] family meeting    CODE STATUS:  [x] Full Code [] DNR [] DNI [] Palliative/Comfort Care    DISPOSITION:  [x] ICU [] Stroke Unit [] Floor [] EMU [] RCU [] PCU    [x] Patient is at high risk of neurologic deterioration/death due to: sepsis due to unknown organism, hypotension, cerebral hypoperfusion, acute stroke, altered mental status, respiratory failure requiring intubation     Contact: [phone]

## 2022-05-31 NOTE — PROGRESS NOTE ADULT - ASSESSMENT
Impression:  This is a 67 year old gentleman pmhx CAD s/p MI/PCI/CABG, hx TIA, afib on rivaroxaban, HTN, DM2, PVD, gout, L CEA 2014, and CKD who presented to [facility] 4/11/22 with ANT, tremors, confusion and lethargy.  CT head no acute changes.  S/p initiation of hemodialysis.  Mental status had improved dramatically.  Pt denies any headaches, no slurred speech, no hemiparesis.  He has chronic gout with bilateral finger swelling, pain.  He also has chronic neuropathy.  Both legs are weak.      Persistent encephalopathy prompted re-consultation on 5/10/22.  MRI brain was ordered and revealed small acute infarctions in bilateral posterior centrum semiovale and left corona radiata and left posterior periventricular white matter.  Carotid ultrasound was performed showing new occlusion of the left internal carotid artery, along with greater than 70% stenosis involving the distal right common carotid artery, and mild to moderate flow-limiting stenosis is seen at the left external carotid artery.  Vascular surgery has been consulted and is recommending CTA.  He continues to be encephalopathic and lethargic.      5/30 stroke code activated for altered mental status.  remains altered.  MRI brain shows new small, scattered infarcts, more in left hemisphere.  Unclear if cause of encephalopathy     Diagnosis and Recommendations:  1.  Multifactorial delirium - had been improving but now encephalopathic again.  May just need HD but need to maintain cerebral perfusion in context of anemia, blood loss, left ICA occlusion, right CCA severe stenosis    2. Complete left ICA occlusion and 70% distal CCA stenosis. Has collateral flow via acomm and pcomm but should be re-evaluated for either CEA or carotid stenting. CEA may be ideal considering issues with medication compliance and potential difficulty adhering to DAPT regimen but defer to proceduralists regarding this. On 5/25, Dr David discussed extensively with son at bedside and daughter via FaceTime: all options here have risk associated with them.  Opening the artery may or may not help.  However, it will reduce long-term stroke risk.  It might help with his mental status by helping brain perfusion, though this cannot be guaranteed.  There is risk associated with procedure.    Further questions regarding the procedure will need to be addressed by neurosurgery.   - now seen by IVETH Nolan , planned angiogram wednesday 6/1/22 with possible R carotid stent, requesting med and nephro clearance/coordination.  timing will likely be readdressed with acute events         - MRA was suboptimal not visualizing the neck but MRA head showed left KESHAWN coming from ACOM and left PCA feeding left MCA.  ICA showed no flow.    - in setting of atrial fibrillation infarcts could potentially also be cardioembolic, continue anticoagulation as per cardiology  - s/p carotid doppler, suboptimal CTA/MRA.  However, it is apparent that there is a proximal left ICA occlusion, which would not be a candidate for intervention.  Intervention for right CCA/ICA stenosis would be high risk but it may indeed by a symptomatic stenosis.   - ECHO in February 2022 did not reveal severe cardiomyopathy, vegetation, or LV thrombus.    - HgA1c of 11 also driving stroke risk.  Goal is < 7  - continue high-intensity statin therapy       [facility]

## 2022-05-31 NOTE — PROGRESS NOTE ADULT - ASSESSMENT
ASSESSMENT/PLAN:    NEURO:  ?TIA ?hypoperfusion ?encephalopathy   neuro IR following  cont ASA/plavix/Eliquis  BP augmentation -180    Activity: [] mobilize as tolerated [x] Bedrest [] PT [] OT [] PMNR    PULM: monitor respiratory status  aspiration precaution   CXR    CV:   CAD--hold cardizem, d/c 100mg toprol XL keep metoprolol 12.5mg bid, if requiring onesimo, then hold that also  arterial line placement now  cont atplt/ac as above, cont statin   SBP goal 120-180  phenylephrine prn   check trops, BNP    RENAL:  Fluids: IVL   ESRD on HD, got CT contrast overnight, renal aware    GI:  Diet: NPO  needs NGT placement   GI prophylaxis [x] not indicated [] PPI [] other:  Bowel regimen [] colace [x] senna [x] other: miralax    ENDO:   Goal euglycemia (-180)    HEME/ONC:  VTE prophylaxis: [x] SCDs [x] chemoprophylaxis on Eliquis [] hold chemoprophylaxis due to: [] high risk of DVT/PE on admission due to:  downtrending H/H, f/u hemolysis panel, FOBT    ID: hypothermic earlier today, worsening leukocytosis over last few days  UA/ucx/bcx/CXR now  proc     MISC:     SOCIAL/FAMILY:  [] awaiting [x] updated regarding transfer [] family meeting    CODE STATUS:  [x] Full Code [] DNR [] DNI [] Palliative/Comfort Care    DISPOSITION:  [x] ICU [] Stroke Unit [] Floor [] EMU [] RCU [] PCU    [x] Patient is at high risk of neurologic deterioration/death due to: sepsis due to unknown organism, hypotension, cerebral hypoperfusion, acute stroke, altered mental status, respiratory failure requiring intubation     Contact: [phone]

## 2022-05-31 NOTE — PROGRESS NOTE ADULT - ASSESSMENT
Echo 5/13/22:  1. Normal left ventricular internal dimensions and wall  thicknesses.  2. Endocardial visualization enhanced with intravenous  injection of Ultrasonic Enhancing Agent (Lumason). Grossly  borderline normal left ventricular systolic function; no  obvious segmental wall motion abnormality.  3. The right ventricle is not well visualized.  *** Compared with echocardiogram of 2/22/2022, no  significant changes noted.      A/P    68 y/o M with history of CAD, s/p multiple PCI, with most recent 2/22 PCI of LAD in setting of NSTEMI, on ASA only (pradaxa), chronic atrial fibrillation maintained on Pradaxa, essential HTN, type 2 DM previously on oral medications but on insulin, diabetic neuropathy with chronic RIGHT LE venous stasis changes>left, LEFT foot ulcer seen by podiatry, past endarterectomy LEFT CEA in 2014 presenting with 1 week of decreased urine output, cystitis with hematuria     #ANT on CKD, new HD  -oliguric renal failure in setting of UTI/cystitis  -New HD for uremia, renal failure  -s/p L AVG 4/18, s/p permacath placement 4/20  -renal f/ u    #AMS/Lethargy  -ams likely from pain meds, embolic cva  -repeat CT 4/27 unremarkable  -MRI 5/11 revealed small acute infarctions in bilateral posterior centrum semiovale and left corona radiata and left posterior periventricular white matter  -repeat echo with no interval changes  -sp RRT 5/3- repeat imaging noted - transferred to NSICU  -neuro following     #Acute on chronic HFpEF  -stable  -continue volume removal with HD  -c/w bb  -repeat echo with stable borderline lv fxn    #Chronic AF  -rates stable  -0ccb on hold  -BB decreased to 12.5 mg BID per NSICU   -c/w eliquis 5 mg BID - Hold if needed  per NSICU     #HTN  -stable  -c/w meds per NSICU    #CAD, s/p PCI, most recent 2/2022  -stable, cont asa, plavix   -statin     #carotid stenosis   -previous MRA  noted with Greater than 75% stenosis of the right distal common carotid artery. Approximately 60% stenosis of the proximal left internal carotid artery.  -carotid dopplers 5/11 noted :  There is new occlusion of the left internal carotid artery;  greater than 70% stenosis involving the distal right common carotid artery as was seen previously. Mild to moderate flow-limiting stenosis is seen at the left external   carotid artery.  -CTa head and neck 5/12 noted  -Continue ASA/plavix, a/c  -await carotid angio, poss carotid stenting 6/1 by neurosx  -patient remains cardiac optimized to proceed with acceptable cardiac risk  -neuro sx ok with paula kamara on board for cerebral angio

## 2022-05-31 NOTE — PROGRESS NOTE ADULT - ASSESSMENT
67 year old gentleman with PMH of CAD, NSTEMI s/p 3 stents in 2014 (stents to LAD, proximal and distal LCx), ischemic cardiomyopathy, HTN for 10 years, T2DM for 26 years c/b peripheral neuropathy, CVA, TIA, Afib on Pradaxa, chronic RLE wound, L carotid stenosis s/p endarterectomy (2014) just recently admitted  to the [facility] on 2/19/2022 with acute onset chest pain for one day found to have an NSTEMI, CAD, s/p PCI in setting of increased AF rates off bb for 3 days and resolved chest pain, his CKMB peaked and Echo noted with EF 60%,normal LV function, mild TR, mild KS. He was s/p LHC with 85% proximal LAD s/p balloon angioplasty and LULU. He presented to [facility] ED with decreased urine output over the week. [facility]. Stevens went to [facility] for hematuria and was started  on Nitrofurantoin. Pt is still taking Nitrofurantoin w/ 5 days left. He came to the ED due to worsening symptoms. Pt reports having a similar incident 4-5 years ago that resolved spontaneously. He reports increased leg edema Dyspnea worse on exertion. his baseline Serum creatinine is in the 2.0 to 2.3 mg/dl range. ANT with serum creatinine of 8.29 with bun 144 and k 5.5      1- ESRD likely   2- chf chronic   3- hyperphosphatemia /shpt   4- anemia   5- hyperlipidemia   6- HTN /a fib  7- TIA hx   8- hx of carotid stenosis      transfter to neursx team for mental status changes.   CT head, MRI today  hypotension now requiring pressor support  blood cx pending   anemia - requiring prbc today  epogen 06742 Units TIW with HD.    venofer 100 mg IVP with HD  trend hgb  no renal contraindication to proceed with neuro angio   will plan for HD post angio 67 year old gentleman with PMH of CAD, NSTEMI s/p 3 stents in 2014 (stents to LAD, proximal and distal LCx), ischemic cardiomyopathy, HTN for 10 years, T2DM for 26 years c/b peripheral neuropathy, CVA, TIA, Afib on Pradaxa, chronic RLE wound, L carotid stenosis s/p endarterectomy (2014) just recently admitted  to the [facility] on 2/19/2022 with acute onset chest pain for one day found to have an NSTEMI, CAD, s/p PCI in setting of increased AF rates off bb for 3 days and resolved chest pain, his CKMB peaked and Echo noted with EF 60%,normal LV function, mild TR, mild OK. He was s/p LHC with 85% proximal LAD s/p balloon angioplasty and LULU. He presented to [facility] ED with decreased urine output over the week. [facility] went [facility] for [facility]uria and was started  on Nitrofurantoin. Pt is still taking Nitrofurantoin w/ 5 days left. He came to the ED due to worsening symptoms. Pt reports having a similar incident 4-5 years ago that resolved spontaneously. He reports increased leg edema Dyspnea worse on exertion. his baseline Serum creatinine is in the 2.0 to 2.3 mg/dl range. ANT with serum creatinine of 8.29 with bun 144 and k 5.5      1- ESRD   2- chf chronic   3- hyperphosphatemia /shpt   4- anemia   5- hyperlipidemia   6- HTN /a fib  7- TIA hx   8- hx of carotid stenosis      transfter to neursx team for mental status changes.   CT head, MRI today  hypotension now requiring pressor support  blood cx pending   anemia - requiring prbc today  epogen 01826 Units TIW with HD.    venofer 100 mg IVP with HD  trend hgb  no renal contraindication to proceed with neuro angio   will plan for HD post angio

## 2022-05-31 NOTE — CHART NOTE - NSCHARTNOTEFT_GEN_A_CORE
[facility]      67y old  Male who presents with a chief complaint of Decreased urine output, leg edema (30 May 2022 12:33)    Dr. Arce covering for Dr. Mendoza Corral. Aware of NSX transfer and acceptance.     Vital Signs Last 24 Hrs  T(C): 36.9 (31 May 2022 00:44), Max: 36.9 (31 May 2022 00:44)  T(F): 98.4 (31 May 2022 00:44), Max: 98.4 (31 May 2022 00:44)  HR: 66 (31 May 2022 00:44) (66 - 94)  BP: 100/58 (31 May 2022 00:44) (100/58 - 167/75)  BP(mean): --  RR: 20 (31 May 2022 00:44) (18 - 20)  SpO2: 92% (31 May 2022 00:44) (92% - 97%)    MEDICATIONS  (STANDING):  allopurinol 100 milliGRAM(s) Oral daily  apixaban 5 milliGRAM(s) Oral two times a day  aspirin  chewable 81 milliGRAM(s) Oral daily  atorvastatin 40 milliGRAM(s) Oral at bedtime  chlorhexidine 4% Liquid 1 Application(s) Topical <User Schedule>  clopidogrel Tablet 75 milliGRAM(s) Oral daily  dextrose 50% Injectable 25 Gram(s) IV Push once  dextrose 50% Injectable 25 Gram(s) IV Push once  dextrose 50% Injectable 12.5 Gram(s) IV Push once  diltiazem    milliGRAM(s) Oral daily  insulin lispro (ADMELOG) corrective regimen sliding scale   SubCutaneous three times a day before meals  insulin lispro (ADMELOG) corrective regimen sliding scale   SubCutaneous at bedtime  lidocaine   4% Patch 1 Patch Transdermal daily  metoprolol succinate  milliGRAM(s) Oral daily  mupirocin 2% Ointment 1 Application(s) Both Nostrils two times a day  Nephro-reena 1 Tablet(s) Oral daily  ofloxacin 0.3% Solution 1 Drop(s) Right EYE four times a day  polyethylene glycol 3350 17 Gram(s) Oral two times a day  povidone iodine 10% Solution 1 Application(s) Topical daily  senna 2 Tablet(s) Oral at bedtime    MEDICATIONS  (PRN):  bisacodyl 5 milliGRAM(s) Oral every 12 hours PRN Constipation  dextrose Oral Gel 15 Gram(s) Oral once PRN Blood Glucose LESS THAN 70 milliGRAM(s)/deciliter  sodium chloride 0.9% lock flush 10 milliLiter(s) IV Push every 1 hour PRN Pre/post blood products, medications, blood draw, and to maintain line patency      LABS:                                   7.1    17.38 )-----------( 412      ( 30 May 2022 22:40 )             24.1   05-30    133<L>  |  95<L>  |  64<H>  ----------------------------<  208<H>  4.2   |  23  |  6.75<H>    Ca    8.4      30 May 2022 22:40  Phos  2.7     05-30  Mg     2.1     05-30    TPro  6.1  /  Alb  2.9<L>  /  TBili  0.4  /  DBili  x   /  AST  12  /  ALT  <5<L>  /  AlkPhos  121<H>  05-30            RADIOLOGY & ADDITIONAL TESTS:    < from: CT Angio Head w/ IV Cont (05.30.22 @ 23:33) >      ACC: [id number] EXAM:  CT PERFUSION W MAPS IC                        ACC: [id number] EXAM:  CT ANGIO BRAIN (W)AW IC                        ACC: [id number] EXAM:  CT ANGIO NECK (W) IC                          PROCEDURE DATE:  05/30/2022          INTERPRETATION:  HISTORY:  Code stroke    COMPARISON: CT angiography head and neck at 5/12/2022    TECHNIQUE: CT angiogram of the neck and brain after administration of   intravenous contrast. MIP and 3D reconstructions were performed.    Perfusion CT through the brain obtained during separate bolus injection   of intravenous contrast.  Processing of the CT perfusion data,   specifically, maps of mean transit time, cerebral blood flow and cerebral   blood volume were generated using ischemia RAPID.    Total of 70 cc Omnipaque 300 intravenous contrast were administered   without complication. 0 cc intravenous contrast discarded.    FINDINGS:    CT PERFUSION:    CT perfusion data is incorrect due to patient motion. RAPID data   processing failed secondary to no suitable AIF location found.      CTA NECK:    Arterial opacification is not optimal.    Three-vessel aortic arch. Atherosclerotic occasions of the aortic arch   and involving the proximal great vessels. Right IJ approach central   venous catheter. Common carotid arteries are patent, however, short   segment severe stenoses of the mid to distal common carotid arteries   bilaterally (right worse than left) secondary to intimal plaque and   atherosclerotic calcification. Complete occlusionof the cervical left   internal carotid artery just distal to the origin. Cervical right   internal carotid artery is patent but overall diminutive. Cervical   vertebral arteries are patent from the origins to the skull base. The   left vertebral artery is dominant.    The regional soft tissues of the neck are otherwise unremarkable. The   lung apices demonstrate a moderate-sized left pleural effusion. There are   degenerative changes of the spine.      CTA BRAIN:    The skull base, petrous, and cavernous segments of the left internal   carotid artery are occluded. There is reconstitution of flow in the   supraclinoid segment. The skull base and intracranial right internal   carotid artery is patent, however, there is short segment moderate   stenoses of the cavernous and supraclinoid segments. The proximal MCAs   and ACAs are patent without high-grade stenosis. The anterior   communicating artery is visualized. Distal MCA and KESHAWN branches are   grossly symmetric.    The skull base and intradural vertebral arteries and basilar artery are   patent without high-grade stenosis. There are short segment mild stenoses   of the basilar artery. The proximal PCAs are patent without significant   stenosis. There are short segment mild stenoses of the proximal PCAs. The   left posterior communicating artery is visualized. The superior   cerebellar artery origins, a right AICA /PICA, a left AICA, and a left   PICA are identified.    There is no evidence of an intracranial arterial aneurysm or  arteriovenous malformation on CTA.      IMPRESSION:    CT PERFUSION: CT perfusion data is incorrect due to patient motion. RAPID   data processing failed secondary to no suitable AIF location found.    CTA NECK: Occlusion of the left internal carotid artery just distal to   the origin as seen on prior exam.    Short segment severe stenoses of the mid to distal common carotid   arteries bilaterally (right worse than left).    Patent cervical vertebral arteries.    CTA BRAIN: Skull base, petrous,cavernous segments of the left internal   carotid artery are occluded with reconstitution of flow in the   supraclinoid segment.    Atherosclerotic calcifications of the cavernous and clinoid segments of   the internal carotid arteries with resultantmoderate stenoses.    Short segment mild stenoses of the basilar artery and short segment mild   stenoses of the proximal PCAs.    --- End of Report ---    HAI PACKER MD; Resident Radiologist  This document has been electronically signed.  NELA GEE DO; Attending Radiologist  This document has been electronically signed. May 31 2022 12:48AM    < end of copied text >        Assessment and Plan:   · Assessment	  ·  Problem: ESRD. c/w permanent hemodialysis per renal  ANT/CKD- to cont with permacath as per renal       Problem/Plan - 2:  ·  Problem: Chronic atrial fibrillation. c/w a/c-   -c/w cardizem 240 mg daily   -c/w metoprolol succ 100 mg qd  -c/w eliquis 5 mg BID for now - heme stable     cards following- Acute on chronic HFpEF  -repeat echo with no interval changes  -continue volume removal with HD  -c/w bb  -repeat echo with stable borderline LV fxn       Problem/Plan - 3:  ·  Problem: Cystitis.   ID follow up appreciated        Problem/Plan - 4:  ·  Problem: Type 2 diabetes mellitus. c/w insulin   endo f/u      Problem/Plan - 5:  ·  Problem: CAD (coronary artery disease).   rapid a fib better  meds adjusted  hr stable  c/w a/c       Problem/Plan - 6:  ·  Problem: Persistent encephalopathy   neuro following   mental status better  polypharmacy contributing and volume status   minimizing meds       Problem/Plan - 7:  ·  Problem: Carotid duplex with stenosis   cta noted  mra/ mri noted  tx plans as per vasc / neuro - stent planned for 6/1/22  neuro f/u noted  neuro sx cancelled   wed / spoke w/ neuro   Angiogram pending   Eliquis to continue as per NSx.    -previous MRA  noted with Greater than 75% stenosis of the right distal common carotid artery. Approximately 60% stenosis of the proximal left internal carotid artery.  -carotid dopplers 5/11 noted :  There is new occlusion of the left internal carotid artery;  greater than 70% stenosis involving the distal right common carotid artery as was seen previously. Mild to moderate flow-limiting stenosis is seen at the left external   carotid artery.  -CTa head and neck 5/12 noted  -Continue ASA/plavix, a/c  -await carotid angio, poss carotid stenting wed by neurosx  -patient remains cardiac optimized to proceed wit5h acceptable cardiac risk  -neuro sx ok with dap, Eliquis on board for cerebral angio     Problem/Plan - 8:  ·  Problem: Left pleural effusion   cxr monday and possible tap tuesday if not better   No acute medical condition identified to postpone planned angiogram.   Per pulmonary, c/w dialysis instead of tapping    Selena Soriano PA-C  [id number]

## 2022-05-31 NOTE — PROGRESS NOTE ADULT - SUBJECTIVE AND OBJECTIVE BOX
Patient is a 67y old  Male who presents with a chief complaint of Decreased urine output for the past week, leg oedema (31 May 2022 18:51)    Coverage for Dr. Mendoza Corral   SUBJECTIVE / OVERNIGHT EVENTS: events noted . In NSCU.  Review of Systems  unobtainable     MEDICATIONS  (STANDING):  allopurinol 100 milliGRAM(s) Oral daily  apixaban 2.5 milliGRAM(s) Oral two times a day  aspirin  chewable 81 milliGRAM(s) Oral daily  atorvastatin 40 milliGRAM(s) Oral at bedtime  chlorhexidine 4% Liquid 1 Application(s) Topical <User Schedule>  insulin lispro (ADMELOG) corrective regimen sliding scale   SubCutaneous every 6 hours  lidocaine   4% Patch 1 Patch Transdermal daily  metoprolol tartrate 12.5 milliGRAM(s) Oral every 12 hours  Nephro-reena 1 Tablet(s) Oral daily  ofloxacin 0.3% Solution 1 Drop(s) Right EYE four times a day  pantoprazole  Injectable 40 milliGRAM(s) IV Push every 12 hours  phenylephrine    Infusion 0.5 MICROgram(s)/kG/Min (22.7 mL/Hr) IV Continuous <Continuous>  polyethylene glycol 3350 17 Gram(s) Oral two times a day  povidone iodine 10% Solution 1 Application(s) Topical daily  senna 2 Tablet(s) Oral at bedtime  sucralfate 1 Gram(s) Oral every 12 hours    MEDICATIONS  (PRN):  bisacodyl 5 milliGRAM(s) Oral every 12 hours PRN Constipation      Vital Signs Last 24 Hrs  T(C): 36.8 (31 May 2022 19:00), Max: 37.1 (31 May 2022 15:00)  T(F): 98.3 (31 May 2022 19:00), Max: 98.7 (31 May 2022 15:00)  HR: 92 (31 May 2022 19:30) (57 - 115)  BP: 80/51 (31 May 2022 09:00) (80/51 - 150/109)  BP(mean): 60 (31 May 2022 09:00) (60 - 123)  RR: 20 (31 May 2022 19:30) (14 - 26)  SpO2: 99% (31 May 2022 19:30) (92% - 100%)    PHYSICAL EXAM:  GENERAL: sick  HEAD:  Atraumatic, Normocephalic  EYES: EOMI, PERRLA, conjunctiva and sclera clear  NECK: Supple, No JVD  CHEST/LUNG: Clear to auscultation bilaterally; No wheeze  HEART: Regular rate and rhythm; No murmurs, rubs, or gallops  ABDOMEN: Soft, Nontender, Nondistended; Bowel sounds present NGT  EXTREMITIES:  2+ Peripheral Pulses, No clubbing, cyanosis, or edema  NEUROLOGY: lethargic    LABS:                        7.1    19.72 )-----------( 396      ( 31 May 2022 15:48 )             23.5     05-31    135  |  94<L>  |  68<H>  ----------------------------<  232<H>  3.9   |  22  |  6.99<H>    Ca    8.5      31 May 2022 02:28  Phos  3.2     05-31  Mg     2.1     05-31    TPro  6.1  /  Alb  2.9<L>  /  TBili  0.4  /  DBili  x   /  AST  12  /  ALT  <5<L>  /  AlkPhos  121<H>  05-30    PT/INR - ( 31 May 2022 02:35 )   PT: 28.5 sec;   INR: 2.46 ratio         PTT - ( 31 May 2022 02:35 )  PTT:33.2 sec            RADIOLOGY & ADDITIONAL TESTS:    Imaging Personally Reviewed:  < from: Xray Chest 1 View- PORTABLE-Urgent (Xray Chest 1 View- PORTABLE-Urgent .) (05.31.22 @ 12:34) >  IMPRESSION:  Support devices as above.  Small left pleural effusion and bibasilar patchy opacities, likely   atelectasis.    < end of copied text >  < from: MR Head No Cont (05.31.22 @ 08:42) >  Limited MRI was performed using axial diffusion and susceptibility   weighted sequence as well as axial T2-weighted sequence. This exam is   compared with prior brain MRI performed on May 11, 2022.    Scattered areas of abnormal T2 prolongation with restricted diffusion is   seen involving the bilateral corona radiata and centrum semiovale region   as well as the left periatrial region. These findings compatible with   areas of acute infarcts. These findings could be secondary to systemic   etiology. Please correlate clinically.    Parenchymal volume loss and chronic microvascular ischemic changes are   identified    Multiple inflammatory changes seen involving the right mastoid.    The visualized paranasal sinuses mastoid and middle ear regions appear   clear.    IMPRESSION: Acute infarcts as described above.    < end of copied text >    Consultant(s) Notes Reviewed:      Care Discussed with Consultants/Other Providers:

## 2022-05-31 NOTE — PROGRESS NOTE ADULT - SUBJECTIVE AND OBJECTIVE BOX
SUBJECTIVE: events noted.  stroke code activated.  CT head and CTA obtained.  pt transferred to NSCU for monitoring with also consideration to contrast load with renal failure.  Had dark tarry stool.  Hgb 7's.  remains altered.  Was on precedex prior    Medications:  MEDICATIONS  (STANDING):  allopurinol 100 milliGRAM(s) Oral daily  apixaban 5 milliGRAM(s) Oral two times a day  aspirin  chewable 81 milliGRAM(s) Oral daily  atorvastatin 40 milliGRAM(s) Oral at bedtime  chlorhexidine 4% Liquid 1 Application(s) Topical <User Schedule>  clopidogrel Tablet 75 milliGRAM(s) Oral daily  dexMEDEtomidine Infusion 0.3 MICROgram(s)/kG/Hr (9.07 mL/Hr) IV Continuous <Continuous>  insulin lispro (ADMELOG) corrective regimen sliding scale   SubCutaneous every 6 hours  lidocaine   4% Patch 1 Patch Transdermal daily  metoprolol tartrate 12.5 milliGRAM(s) Oral every 12 hours  mupirocin 2% Ointment 1 Application(s) Both Nostrils two times a day  Nephro-reena 1 Tablet(s) Oral daily  ofloxacin 0.3% Solution 1 Drop(s) Right EYE four times a day  pantoprazole  Injectable 40 milliGRAM(s) IV Push every 12 hours  polyethylene glycol 3350 17 Gram(s) Oral two times a day  povidone iodine 10% Solution 1 Application(s) Topical daily  senna 2 Tablet(s) Oral at bedtime  sucralfate 1 Gram(s) Oral every 12 hours    MEDICATIONS  (PRN):  bisacodyl 5 milliGRAM(s) Oral every 12 hours PRN Constipation      Labs:  CBC Full  -  ( 31 May 2022 02:28 )  WBC Count : 17.08 K/uL  RBC Count : 2.92 M/uL  Hemoglobin : 7.1 g/dL  Hematocrit : 24.0 %  Platelet Count - Automated : 440 K/uL  Mean Cell Volume : 82.2 fl  Mean Cell Hemoglobin : 24.3 pg  Mean Cell Hemoglobin Concentration : 29.6 gm/dL  Auto Neutrophil # : x  Auto Lymphocyte # : x  Auto Monocyte # : x  Auto Eosinophil # : x  Auto Basophil # : x  Auto Neutrophil % : x  Auto Lymphocyte % : x  Auto Monocyte % : x  Auto Eosinophil % : x  Auto Basophil % : x    05-31    135  |  94<L>  |  68<H>  ----------------------------<  232<H>  3.9   |  22  |  6.99<H>    Ca    8.5      31 May 2022 02:28  Phos  3.2     05-31  Mg     2.1     05-31    TPro  6.1  /  Alb  2.9<L>  /  TBili  0.4  /  DBili  x   /  AST  12  /  ALT  <5<L>  /  AlkPhos  121<H>  05-30    CAPILLARY BLOOD GLUCOSE  211 (30 May 2022 22:56)      POCT Blood Glucose.: 215 mg/dL (31 May 2022 05:56)  POCT Blood Glucose.: 211 mg/dL (30 May 2022 22:25)  POCT Blood Glucose.: 200 mg/dL (30 May 2022 21:27)  POCT Blood Glucose.: 180 mg/dL (30 May 2022 16:56)  POCT Blood Glucose.: 182 mg/dL (30 May 2022 13:01)  POCT Blood Glucose.: 176 mg/dL (30 May 2022 11:48)    LIVER FUNCTIONS - ( 30 May 2022 22:40 )  Alb: 2.9 g/dL / Pro: 6.1 g/dL / ALK PHOS: 121 U/L / ALT: <5 U/L / AST: 12 U/L / GGT: x           PT/INR - ( 31 May 2022 02:35 )   PT: 28.5 sec;   INR: 2.46 ratio         PTT - ( 31 May 2022 02:35 )  PTT:33.2 sec      Vitals:  Vital Signs Last 24 Hrs  T(C): 36.6 (31 May 2022 07:00), Max: 36.9 (31 May 2022 00:44)  T(F): 97.9 (31 May 2022 07:00), Max: 98.4 (31 May 2022 00:44)  HR: 88 (31 May 2022 08:00) (66 - 101)  BP: 150/109 (31 May 2022 01:05) (100/58 - 150/109)  BP(mean): 123 (31 May 2022 01:05) (123 - 123)  RR: 17 (31 May 2022 08:00) (14 - 22)  SpO2: 100% (31 May 2022 08:00) (92% - 100%)      NEUROLOGICAL EXAM:    Mental status: asleep, reacts to deep painful stim.  Not talking at present.  not following commands.      Cranial Nerves: Pupils were equal, round, reactive to light. Extraocular movements were intact. B BTT Facial sensation was intact to light touch. There was no facial asymmetry.      Motor exam: Bulk and tone were normal. moves all ext minimally.  not cooperative at present.       Reflexes: DTRs depressed, flexor plantars.     Sensation: Intact to noxious, seems intact to light touch, due to decreased attention did not assess other modalities     Coordination: no gross dysmetria    Gait: deferred    NIHSS: 20    Imaging:    ACC: [id number] EXAM:  CT BRAIN                        PROCEDURE DATE:  04/09/2022      INTERPRETATION:  CLINICAL INFORMATION:  Altered mental status, on   anticoagulation .    TECHNIQUE: Multiple contiguous axial images were acquired from the   skullbase to the vertex without the administration of intravenous   contrast.  Coronal and sagittal reformations were made.    COMPARISON: CT head 10/21/2021, brain MRI 02/23/2014.    FINDINGS:    Scattered lacunar infarcts in the bilateral cerebralhemispheres are   grossly stable compared to the 10/21/2021 head CT. No new additional   infarcts are noted over the time interval.    No acute intracranial hemorrhage, mass effect, or midline shift. The   brain demonstrates periventricular hypoattenuation, nonspecific in   etiology but likely representing chronic microvascular ischemic changes.    No abnormal extra-axial fluid collections are present.    The ventricles and sulci demonstrate age appropriate involutional   changes.  The basal cisterns are patent.    The orbits are unremarkable. The paranasal sinuses are clear. The mastoid   air cells are clear. The calvarium is intact.    IMPRESSION:    No acute intracranial abnormality is noted. If the patient has new and   persistent symptoms, consider short interval follow-up head CT or brain   MRI.    --- End of Report ---    GATITO VILLARREAL MD; Resident Radiologist  This document has been electronically signed.  JESSE CORONA MD; Attending Radiologist  This document has been electronically signed. Apr 9 2022  2:00PM        ACC: [id number] EXAM:  MR BRAIN                          PROCEDURE DATE:  05/11/2022          INTERPRETATION:  MR brain  without gadolinium    CLINICAL INFORMATION: Stroke.    TECHNIQUE: Limited Sagittal T1-weighted images, axial FLAIR images, and   axial diffusion weighted images of the brain were obtained.  Patient was   unable to hold still for remaining sequences.    FINDINGS:   MRI dated 02/23/2014 available for review.    The brain demonstrates small acute infarctions in the BILATERAL posterior   centrum semiovale and LEFT corona radiata and LEFT posterior   periventricular white matter with restricted diffusion. No intracranial   hemorrhage is recognized. No mass effect is found in the brain.    The ventricles, sulci and basal cisterns show mild global atrophy most   prominent within the BILATERAL cerebellum.    The vertebral and internal carotid arteries demonstrate expected flow   voids indicating their patency.    The orbits are unremarkable.  The paranasal sinuses are clear.  The nasal   cavity appears intact.  The nasopharynx is symmetric.  The central skull   base and temporal bones are intact.  The calvarium appears unremarkable.    IMPRESSION: Small acute infarctions in the BILATERAL posterior centrum   semiovale and LEFT corona radiata and LEFT posterior periventricular   white matter with restricted diffusion.   mild global atrophy most   prominent within the BILATERAL cerebellum.    --- End of Report ---            JESÚS PADGETT MD; Attending Radiologist  This document has been electronically signed. May 11 2022  5:47PM        Patient name: DYLAN DEL CASTILLO  YOB: 1955   Age: 67 (M)   MR#: [id number]  Study Date: 2/22/2022  [facility]  Study quality: Technically difficult  Referring Physician: Mendoza Corral MD  Blood Pressure: 152/82 mmHg  Height: 180 cm  Weight: 136 kg  BSA: 2.5 m2  ------------------------------------------------------------------------  PROCEDURE: Transthoracic echocardiogram with 2-D, M-Mode  and complete spectral and color flow Doppler. Verbal  consent was obtained for injection of  Ultrasonic Enhancing  Agent following a discussion of risks and benefits.  Following intravenous injection of Ultrasonic Enhancing  Agent , harmonic imaging was performed.  INDICATION: Chest pain, unspecified (R07.9)  ------------------------------------------------------------------------  Dimensions:    Normal Values:  LA:     4.4    2.0 - 4.0 cm  Ao:     4.0    2.0 - 3.8 cm  SEPTUM: 1.1    0.6 - 1.2 cm  PWT:    0.9    0.6 - 1.1 cm  LVIDd:  5.0    3.0 - 5.6 cm  LVIDs:  3.5    1.8 - 4.0 cm  Derived variables:  LVMI: 73 g/m2  RWT: 0.36  EF (Visual Estimate): 60 %  Doppler Peak Velocity (m/sec): AoV=1.5 TV=2.2  ------------------------------------------------------------------------  Observations:  Mitral Valve: Normal mitral valve.  Aortic Valve/Aorta: Calcified aortic valve with normal  opening. Minimal aortic regurgitation.  Normal aortic root size.  Left Atrium: Mildly dilated left atrium.  Left Ventricle: Endocardial visualization enhanced with  intravenous injection of Ultrasonic Enhancing Agent  (Definity).  Normal left ventricular internal dimensions and wall  thickness.  Normal left ventricular systolic function. No segmental  wall motion abnormalities.  Right Heart: Normal right atrium. Normal right ventricular  size and function.  Normal tricuspid valve. Mild tricuspid regurgitation.  Normal pulmonic valve. Mild pulmonic regurgitation.  Pericardium/Pleura: Normal pericardium with no pericardial  effusion.  Hemodynamic: No evidence of pulmonary hypertension.  ------------------------------------------------------------------------  Conclusions:  Endocardial visualization enhanced with intravenous  injection of Ultrasonic Enhancing Agent (Definity).  Normal left ventricular systolic function. No segmental  wall motion abnormalities.  ------------------------------------------------------------------------  Confirmed on  2/22/2022 - 16:17:39 by Ishan Ackerman MD, FASE  ------------------------------------------------------------------------    < from: CT Angio Head w/ IV Cont (05.12.22 @ 13:34) >    ACC: [id number] EXAM:  CT ANGIO NECK (W)AW IC                        ACC: [id number] EXAM:  CT ANGIO BRAIN (W)AW IC                          PROCEDURE DATE:  05/12/2022          INTERPRETATION:  CT angiography of the brain and neck    CLINICAL INDICATION: Recent infarcts. Carotid stenosis.    TECHNIQUE: Direct axial CT scanning of the brain and neck was obtained   from the vertex to the level of the clavicular heads after the dynamic   intravenous injection of 44 cc of Omnipaque 300. 0 cc were discarded.   Sagittal and coronal maximum intensity projection reformats were   provided.  Three-dimensional reconstructions were performed by the   radiologist using the iRew[employer] workstation.    Additionally, noncontrast axial CT scanning of the brain was obtained   from the skull base to the vertex. Sagittal and coronal reformats were   provided.    COMPARISON: MRA neck 10/24/2021    FINDINGS:    CT brain:    Previously seen acute infarcts in the bilateral cerebral hemispheres are   redemonstrated. No CT evidence for hemorrhagic transformation.    No hydrocephalus, midline shift, or effacement of basal cisterns.    No acute intracranial hemorrhage or vasogenic edema.    Mild to moderate white matter microvascular ischemic disease.    CTA brain:    Limited by relatively decreased opacification of the arteries.    Multifocal narrowing of the right skull base ICA due to calcified plaque,   the degree of which is not well evaluated.    Normal flow-related enhancement of the supraclinoid right ICA.    Lack of flow related enhancement of the petrous, precavernous, and   cavernous left ICA. Reconstitution of the vessel at its supraclinoid   segment.    Otherwise no flow-limiting stenosis.    Normal flow-related enhancement of the bilateral anterior, middle, and   posterior cerebral arteries.    Normal flow-related enhancement of the intradural vertebral arteries and   basilar artery.    No vascular aneurysm or AVM.    CTA neck:    Stenoses described in this report are on the basis of NASCET criteria.    Study is significantly limited by relatively decreased opacification of   the arteries.    Short segment stenosis of the mid left common carotid artery due to the   presence of partially calcified plaque is poorly evaluated. Flow-related   enhancement of the more proximal and distal left common carotid artery is   noted.    Long segment stenosis of the mid and distal right common carotid artery   due to the presence of partially calcified plaque is poorly evaluated.    Rapidly progressive stenosis of the left internal carotid artery   beginning at its origin. No flow related enhancement of the vessel beyond   its origin.    Normal flow-related enhancement of the bilateral vertebral arteries.    No gross evidence for acute arterial dissection.    IMPRESSION:    CTA brain:  Limited by relatively decreased opacification of the arteries.    Multifocal narrowing of the right skull base ICA due to calcified plaque,   the degree of which is not well evaluated.    Lack of flow related enhancement of the petrous, precavernous, and   cavernous left ICA. Reconstitution of the vessel at its supraclinoid   segment.    Otherwise no flow-limiting stenosis.    No vascular aneurysm or AVM.    CTA neck:  Limited by relatively decreased opacification of the arteries.    Short segment stenosis of the mid left common carotid artery due to the   presence of partially calcified plaque is poorly evaluated. Flow-related   enhancement of the more proximal and distal left common carotid artery is   noted.    Long segment stenosis of the mid and distal right common carotid artery   due to the presence of partially calcified plaque is poorly evaluated.    Rapidly progressive stenosis of the left internal carotid artery   beginning at its origin. No flow related enhancement of the vessel beyond   its origin.    Recommend correlation with contrast-enhanced MRI of the neck for further   evaluation.    --- End of Report ---            MIGUEL ANGEL CARRILLO MD; Attending Radiologist  This document has been electronically signed. May 12 2022  2:47PM    < end of copied text >

## 2022-05-31 NOTE — PROGRESS NOTE ADULT - PROBLEM SELECTOR PLAN 1
Likely 2nd to b/l pl effusions, atelectasis  -Suspect fluid overload given elevated proBNP, LE edema on admission   -Keep O>I with HD as tolerated   -Pt with hx of hydroPTX. CT chest in 2/2022 with pleural thickening, atelectasis. Findings at the time suspected to be chronic. CT A/P 4/2 with small b/l pl effusions L>R, pleural thickening  -CT chest 4/4 twith small b/l pl effusion R>L, bibasilar atelectasis  -Appeared mildly labored last week. CT chest 5/27 with no PNA, but with increase in L pl effusion. Less labored now, no plans for thora. Suggest optimization of fluid removal with HD. Repeat CXR early next week.  -Keep sats >90% with supplemental O2 PRN Likely 2nd to b/l pl effusions, atelectasis  -Suspect fluid overload given elevated proBNP, LE edema on admission   -Keep O>I with HD as tolerated   -Pt with hx of hydroPTX. CT chest in 2/2022 with pleural thickening, atelectasis. Findings at the time suspected to be chronic. CT A/P 4/2 with small b/l pl effusions L>R, pleural thickening  -CT chest 4/4 with small b/l pl effusion R>L, bibasilar atelectasis  -Appeared mildly labored last week. CT chest 5/27 with no PNA, but with increase in L pl effusion. Less labored now, no plans for thora. Suggest optimization of fluid removal with HD.   -Keep sats >90% with supplemental O2 PRN

## 2022-05-31 NOTE — PROGRESS NOTE ADULT - SUBJECTIVE AND OBJECTIVE BOX
[facility]   [phone]  RENAL FOLLOW UP NOTE  --------------------------------------------------------------------------------  Chief Complaint:    24 hour events/subjective:    patient seen and examined with Dr. Cordova  not communicative    PAST HISTORY  --------------------------------------------------------------------------------  No significant changes to PMH, PSH, FHx, SHx, unless otherwise noted    ALLERGIES & MEDICATIONS  --------------------------------------------------------------------------------  Allergies    nitrofurantoin (Nephrotoxicity)    Intolerances      Standing Inpatient Medications  allopurinol 100 milliGRAM(s) Oral daily  apixaban 5 milliGRAM(s) Oral two times a day  aspirin  chewable 81 milliGRAM(s) Oral daily  atorvastatin 40 milliGRAM(s) Oral at bedtime  chlorhexidine 4% Liquid 1 Application(s) Topical <User Schedule>  clopidogrel Tablet 75 milliGRAM(s) Oral daily  insulin lispro (ADMELOG) corrective regimen sliding scale   SubCutaneous every 6 hours  lidocaine   4% Patch 1 Patch Transdermal daily  metoprolol tartrate 12.5 milliGRAM(s) Oral every 12 hours  mupirocin 2% Ointment 1 Application(s) Both Nostrils two times a day  Nephro-reena 1 Tablet(s) Oral daily  ofloxacin 0.3% Solution 1 Drop(s) Right EYE four times a day  pantoprazole  Injectable 40 milliGRAM(s) IV Push every 12 hours  phenylephrine    Infusion 0.5 MICROgram(s)/kG/Min IV Continuous <Continuous>  polyethylene glycol 3350 17 Gram(s) Oral two times a day  povidone iodine 10% Solution 1 Application(s) Topical daily  senna 2 Tablet(s) Oral at bedtime  sucralfate 1 Gram(s) Oral every 12 hours    PRN Inpatient Medications  bisacodyl 5 milliGRAM(s) Oral every 12 hours PRN      REVIEW OF SYSTEMS  --------------------------------------------------------------------------------    patient is unable to give ROS    VITALS/PHYSICAL EXAM  --------------------------------------------------------------------------------  T(C): 36.6 (05-31-22 @ 11:00), Max: 36.9 (05-31-22 @ 00:44)  HR: 83 (05-31-22 @ 13:15) (57 - 115)  BP: 80/51 (05-31-22 @ 09:00) (80/51 - 150/109)  RR: 17 (05-31-22 @ 13:15) (14 - 26)  SpO2: 94% (05-31-22 @ 13:15) (92% - 100%)  Wt(kg): --  Height (cm): 180.3 (05-31-22 @ 07:08)  Weight (kg): 120.9 (05-31-22 @ 07:08)  BMI (kg/m2): 37.2 (05-31-22 @ 07:08)  BSA (m2): 2.38 (05-31-22 @ 07:08)      05-31-22 @ 07:01  -  05-31-22 @ 14:41  --------------------------------------------------------  IN: 483.7 mL / OUT: 0 mL / NET: 483.7 mL      Physical Exam:  	              Gen: obtunded  	Pulm: Decreased breath sounds b/l bases.  	CV: No JVD. IRR, +arterial line  	Abd: +BS, soft, nontender, softly distended, obese, +NGT               Extremity no edema              Extremity LE: + hyperpigmented bilateral LE with no edema              Vascular: R IJ HD catheter, L arm AVF     LABS/STUDIES  --------------------------------------------------------------------------------              6.4    19.15 >-----------<  407      [05-31-22 @ 09:40]              22.3     135  |  94  |  68  ----------------------------<  232      [05-31-22 @ 02:28]  3.9   |  22  |  6.99        Ca     8.5     [05-31-22 @ 02:28]      Mg     2.1     [05-31-22 @ 02:28]      Phos  3.2     [05-31-22 @ 02:28]    TPro  6.1  /  Alb  2.9  /  TBili  0.4  /  DBili  x   /  AST  12  /  ALT  <5  /  AlkPhos  121  [05-30-22 @ 22:40]    PT/INR: PT 28.5 , INR 2.46       [05-31-22 @ 02:35]  PTT: 33.2       [05-31-22 @ 02:35]          [05-31-22 @ 02:28]    Creatinine Trend:  SCr 6.99 [05-31 @ 02:28]  SCr 6.75 [05-30 @ 22:40]  SCr 5.05 [05-29 @ 16:28]  SCr 5.94 [05-28 @ 13:08]  SCr 4.48 [05-27 @ 06:59]                  Iron 21, TIBC 245, %sat 9      [05-17-22 @ 05:54]  Ferritin 120      [05-17-22 @ 05:54]  PTH -- (Ca 8.1)      [05-28-22 @ 13:06]   96  PTH -- (Ca 8.5)      [05-24-22 @ 14:22]   84  PTH -- (Ca 8.3)      [04-15-22 @ 12:18]   150  PTH -- (Ca --)      [04-03-22 @ 23:06]   97  HbA1c 11.7      [11-07-19 @ 09:14]  TSH 1.71      [05-12-22 @ 07:11]

## 2022-05-31 NOTE — PROGRESS NOTE ADULT - SUBJECTIVE AND OBJECTIVE BOX
24hr events: transferred to NSCU, MRI showing watershed infarcts, FOBT+, h/h drop, 2U pRBC tramsfusion, HD now/    REVIEW OF SYSTEMS: [x] Unable to Assess due to neurologic exam   [ ] All ROS addressed below are non-contributory, except:  Neuro: [ ] Headache [ ] Back pain [ ] Numbness [ ] Weakness [ ] Ataxia [ ] Dizziness [ ] Aphasia [ ] Dysarthria [ ] Visual disturbance  Resp: [ ] Shortness of breath/dyspnea [ ] Orthopnea [ ] Cough  CV: [ ] Chest pain [ ] Palpitation [ ] Lightheadedness [ ] Syncope  Renal: [ ] Thirst [ ] Edema  GI: [ ] Nausea [ ] Emesis [ ] Abdominal pain [ ] Constipation [ ] Diarrhea  Hem: [ ] Hematemesis [ ] bBright red blood per rectum  ID: [ ] Fever [ ] Chills [ ] Dysuria  ENT: [ ] Rhinorrhea  VITALS: [] Reviewed    IMAGING/DATA: [x] Reviewed    IVF FLUIDS/MEDICATIONS: [x] Reviewed    ALLERGIES: Allergies    nitrofurantoin (Nephrotoxicity)    Intolerances    DEVICES:   [] Restraints [x] PIVs [] ET tube [] central line [] PICC [x] arterial line [] keyes [] NGT/OGT [] EVD [] LD [] MUNDO/HMV [] LiCOX [] ICP monitor [] Trach [] PEG [] Chest Tube [] other:    EXAMINATION:  General: No acute distress  HEENT: Anicteric sclerae  Cardiac: S1S2 irregularly irregular   Lungs: decreased at bases   Abdomen: Soft, non-tender, +BS  Extremities: No c/c/e  Skin/Incision Site: Clean, dry and intact  Neurologic: EOV, Ox0, no FC, R nl fold flat, RUE w/d in plane of bed, LUE ag, RLE trace w/d, LLE w/d ag 24hr events: transferred to NSCU, MRI showing watershed infarcts, FOBT+, h/h drop, 2U pRBC transfusion, HD tonight     REVIEW OF SYSTEMS: [x] Unable to Assess due to neurologic exam   [ ] All ROS addressed below are non-contributory, except:  Neuro: [ ] Headache [ ] Back pain [ ] Numbness [ ] Weakness [ ] Ataxia [ ] Dizziness [ ] Aphasia [ ] Dysarthria [ ] Visual disturbance  Resp: [ ] Shortness of breath/dyspnea [ ] Orthopnea [ ] Cough  CV: [ ] Chest pain [ ] Palpitation [ ] Lightheadedness [ ] Syncope  Renal: [ ] Thirst [ ] Edema  GI: [ ] Nausea [ ] Emesis [ ] Abdominal pain [ ] Constipation [ ] Diarrhea  Hem: [ ] Hematemesis [ ] bBright red blood per rectum  ID: [ ] Fever [ ] Chills [ ] Dysuria  ENT: [ ] Rhinorrhea  VITALS: [] Reviewed    IMAGING/DATA: [x] Reviewed    IVF FLUIDS/MEDICATIONS: [x] Reviewed    ALLERGIES: Allergies    nitrofurantoin (Nephrotoxicity)    Intolerances    DEVICES:   [] Restraints [x] PIVs [] ET tube [] central line [] PICC [x] arterial line [] keyes [] NGT/OGT [] EVD [] LD [] MUNDO/HMV [] LiCOX [] ICP monitor [] Trach [] PEG [] Chest Tube [] other:    EXAMINATION:  General: No acute distress  HEENT: Anicteric sclerae  Cardiac: S1S2 irregularly irregular   Lungs: decreased at bases   Abdomen: Soft, non-tender, +BS  Extremities: No c/c/e  Skin/Incision Site: Clean, dry and intact  Neurologic: EO to stim, oriented to self given choices, able to say yes/no, but otherwise aphasic, no following commands, BUE at least 4/5, RLE WD, LLE at least AG

## 2022-05-31 NOTE — CHART NOTE - NSCHARTNOTEFT_GEN_A_CORE
NSX to PA- NSX will be taking patient to NSCU for care and possible HD s/p contrast received during CTH and CTA H/N during code stroke. MICU and renal were consulted for urgent HD. NSX accepted the patient for urgent HD. Notified daughterLucia of overnight events and transfer to NSX care overnight. Covering attending, Dr. Arce notified of transfer. Will need to be medically optimized for carotid stent planned for Wednesday, 6/1/22.         < from: CT Brain Stroke Protocol (05.30.22 @ 23:33) >      ACC: [id number] EXAM:  CT BRAIN STROKE PROTOCOL                          PROCEDURE DATE:  05/30/2022          INTERPRETATION:  CLINICAL INFORMATION: Code stroke. Altered mental status   and right facial droop.    TECHNIQUE: Noncontrast axial CT images were acquired through the head.    COMPARISON STUDY: CT head 5/12/2022 and MR brain 5/11/2022    FINDINGS:    Multiple small acute bilateral frontoparietal cortical, subcortical, and   periventricular white matter infarcts are again noted but betterseen on   MRI from 5/14/2022. There is no acute intra-axial or extra axial   hemorrhage. There is no significant mass effect or shift of the midline.   The basal cisterns are not effaced. There is cerebral and cerebellar   volume loss with prominenceof the ventricles, sulci, and several folia.   There are superimposed mild chronic ischemic changes in the   frontoparietal white matter. There are atherosclerotic calcifications of   the intracranial carotid arteries and intradural vertebral arteries.    The regional soft tissues and osseous structures are unremarkable apart   from evidence of right lens surgery. The visualized paranasal sinuses and   tympanic/mastoid cavities are essentially clear.    IMPRESSION:    Multiple small acute bilateral frontoparietal cortical, subcortical,   periventricular white matter infarcts again noted but better seen on MRI   from 5/14/2022. No evidence of hemorrhagic transformation.    Findings were discussed by Dr. Packer with JARRETT Liang on 5/30/2022   11:27 PM with read back confirmation.    --- End of Report ---          HAI PACKER MD; Resident Radiologist  This document has been electronically signed.  NELA GEE DO; Attending Radiologist  This document has been electronically signed. May 30 2022 11:42PM    < end of copied text > NSX to PA- NSX will be taking patient to NSCU for care and possible HD s/p contrast received during CTH and CTA H/N during code stroke. MICU and renal were consulted for urgent HD. NSX accepted the patient for urgent HD as SBP needs to be >120s. Notified daughterLucia of overnight events and transfer to NSX care overnight. Covering attending, Dr. Arce notified of transfer. Will need to be medically optimized for carotid stent planned for Wednesday, 6/1/22.     Vital Signs Last 24 Hrs  T(C): 36.9 (31 May 2022 00:44), Max: 36.9 (31 May 2022 00:44)  T(F): 98.4 (31 May 2022 00:44), Max: 98.4 (31 May 2022 00:44)  HR: 66 (31 May 2022 00:44) (66 - 94)  BP: 100/58 (31 May 2022 00:44) (100/58 - 167/75)  BP(mean): --  RR: 20 (31 May 2022 00:44) (18 - 20)  SpO2: 92% (31 May 2022 00:44) (92% - 97%)                 7.1    17.38 )-----------( 412      ( 30 May 2022 22:40 )             24.1   05-30    133<L>  |  95<L>  |  64<H>  ----------------------------<  208<H>  4.2   |  23  |  6.75<H>    Ca    8.4      30 May 2022 22:40  Phos  2.7     05-30  Mg     2.1     05-30    TPro  6.1  /  Alb  2.9<L>  /  TBili  0.4  /  DBili  x   /  AST  12  /  ALT  <5<L>  /  AlkPhos  121<H>  05-30            < from: CT Brain Stroke Protocol (05.30.22 @ 23:33) >      ACC: [id number] EXAM:  CT BRAIN STROKE PROTOCOL                          PROCEDURE DATE:  05/30/2022          INTERPRETATION:  CLINICAL INFORMATION: Code stroke. Altered mental status   and right facial droop.    TECHNIQUE: Noncontrast axial CT images were acquired through the head.    COMPARISON STUDY: CT head 5/12/2022 and MR brain 5/11/2022    FINDINGS:    Multiple small acute bilateral frontoparietal cortical, subcortical, and   periventricular white matter infarcts are again noted but betterseen on   MRI from 5/14/2022. There is no acute intra-axial or extra axial   hemorrhage. There is no significant mass effect or shift of the midline.   The basal cisterns are not effaced. There is cerebral and cerebellar   volume loss with prominenceof the ventricles, sulci, and several folia.   There are superimposed mild chronic ischemic changes in the   frontoparietal white matter. There are atherosclerotic calcifications of   the intracranial carotid arteries and intradural vertebral arteries.    The regional soft tissues and osseous structures are unremarkable apart   from evidence of right lens surgery. The visualized paranasal sinuses and   tympanic/mastoid cavities are essentially clear.    IMPRESSION:    Multiple small acute bilateral frontoparietal cortical, subcortical,   periventricular white matter infarcts again noted but better seen on MRI   from 5/14/2022. No evidence of hemorrhagic transformation.    Findings were discussed by Dr. Packer with JARRETT Liang on 5/30/2022   11:27 PM with read back confirmation.    --- End of Report ---    HAI PACKER MD; Resident Radiologist  This document has been electronically signed.  NELA GEE DO; Attending Radiologist  This document has been electronically signed. May 30 2022 11:42PM    < end of copied text >      Plan:   - Plan for renal dosed zosyn for increased WBC during RRT, per attending, Dr. Arce. BMP draw still pending. Will relay to covering NSX team to consider BMP and plan for zosyn.   - Relayed all overnight events to daughterLucia. Family aware of patient status and plan of care.     Selena Soriano PA-C  [id number]

## 2022-05-31 NOTE — PROGRESS NOTE ADULT - SUBJECTIVE AND OBJECTIVE BOX
HPI:  Patient remotely known to me from an admission to [facility] in Feb 2017--assigned to me at this point by the ER to admit this 68 y/o M--followed by his nephrologist above--patient with a history of CAD with past multiple PCI, with most recent discharge from [facility] in Feb 2022 for non ST elevation MI with LULU of an 85% prox LAD lesion with patient on ASA and now off Plavix per cardiology (only for one month), chronic atrial fibrillation maintained on Pradaxa, essential HTN, type 2 DM previously on oral medications but on insulin, diabetic neuropathy with chronic RIGHT LE venous stasis changes>left, LEFT foot ulcer seen by podiatry, past endarterectomy LEFT CEA in 2014. with patient self referring to the ER following apparently treatment by an urgent care center for an apparent cystitis with hematuria on nitrofurantoin but with patient noting decreased urine output for the past week, and difficulty with B/L coarse tremors for several weeks, with patient having difficulty negotiating his applications on his smart phone (observed by examiner).  Patient with increasing B/L LE oedema and weight gain, with patient with 2 pillow orthopnoea.      Scores on admission   GCS [id number]  NIHSS 18    OVERNIGHT EVENTS:   No acute events overnight.    VITALS:  T(C): , Max: 36.9 (05-31-22 @ 00:44)  HR:  (66 - 101)  BP:  (100/58 - 150/109)  ABP:  (137/40 - 155/43)  RR:  (14 - 22)  SpO2:  (92% - 100%)  Wt(kg): --      05-29-22 @ 07:01  -  05-30-22 @ 07:00  --------------------------------------------------------  IN: 360 mL / OUT: 0 mL / NET: 360 mL      LABS:  Na: 135 (05-31 @ 02:28), 133 (05-30 @ 22:40), 134 (05-29 @ 16:28), 137 (05-28 @ 13:08)  K: 3.9 (05-31 @ 02:28), 4.2 (05-30 @ 22:40), 3.7 (05-29 @ 16:28), 3.7 (05-28 @ 13:08)  Cl: 94 (05-31 @ 02:28), 95 (05-30 @ 22:40), 94 (05-29 @ 16:28), 98 (05-28 @ 13:08)  CO2: 22 (05-31 @ 02:28), 23 (05-30 @ 22:40), 28 (05-29 @ 16:28), 27 (05-28 @ 13:08)  BUN: 68 (05-31 @ 02:28), 64 (05-30 @ 22:40), 20 (05-29 @ 16:28), 26 (05-28 @ 13:08)  Cr: 6.99 (05-31 @ 02:28), 6.75 (05-30 @ 22:40), 5.05 (05-29 @ 16:28), 5.94 (05-28 @ 13:08)  Glu: 232(05-31 @ 02:28), 208(05-30 @ 22:40), 150(05-29 @ 16:28), 167(05-28 @ 13:08)    Hgb: 7.1 (05-31 @ 02:28), 7.1 (05-30 @ 22:40), 8.7 (05-29 @ 16:28), 7.7 (05-28 @ 13:06)  Hct: 24.0 (05-31 @ 02:28), 24.1 (05-30 @ 22:40), 29.9 (05-29 @ 16:28), 26.9 (05-28 @ 13:06)  WBC: 17.08 (05-31 @ 02:28), 17.38 (05-30 @ 22:40), 12.42 (05-29 @ 16:28), 11.21 (05-28 @ 13:06)  Plt: 440 (05-31 @ 02:28), 412 (05-30 @ 22:40), 337 (05-29 @ 16:28), 353 (05-28 @ 13:06)    INR: 2.46 05-31-22 @ 02:35  PTT: 33.2 05-31-22 @ 02:35, 34.1 05-30-22 @ 22:40    IMAGING:   Recent imaging studies were reviewed.    MEDICATIONS:  allopurinol 100 milliGRAM(s) Oral daily  apixaban 5 milliGRAM(s) Oral two times a day  aspirin  chewable 81 milliGRAM(s) Oral daily  atorvastatin 40 milliGRAM(s) Oral at bedtime  bisacodyl 5 milliGRAM(s) Oral every 12 hours PRN  chlorhexidine 4% Liquid 1 Application(s) Topical <User Schedule>  clopidogrel Tablet 75 milliGRAM(s) Oral daily  dextrose 50% Injectable 25 Gram(s) IV Push once  dextrose 50% Injectable 25 Gram(s) IV Push once  dextrose 50% Injectable 12.5 Gram(s) IV Push once  insulin lispro (ADMELOG) corrective regimen sliding scale   SubCutaneous every 6 hours  lidocaine   4% Patch 1 Patch Transdermal daily  metoprolol tartrate 12.5 milliGRAM(s) Oral every 12 hours  mupirocin 2% Ointment 1 Application(s) Both Nostrils two times a day  Nephro-reena 1 Tablet(s) Oral daily  ofloxacin 0.3% Solution 1 Drop(s) Right EYE four times a day  pantoprazole  Injectable 40 milliGRAM(s) IV Push daily  polyethylene glycol 3350 17 Gram(s) Oral two times a day  povidone iodine 10% Solution 1 Application(s) Topical daily  senna 2 Tablet(s) Oral at bedtime  sodium chloride 0.9% lock flush 10 milliLiter(s) IV Push every 1 hour PRN    EXAMINATION:  General:  calm  HEENT:  MMM  Neuro:  [place], oriented x 0, not following commands, moves all extremities  Cards:  RRR  Respiratory:  no respiratory distress  Adomen:  soft  Extremities:  no edema  Skin:  warm/dry

## 2022-05-31 NOTE — PROGRESS NOTE ADULT - SUBJECTIVE AND OBJECTIVE BOX
CARDIOLOGY FOLLOW UP - Dr. Mazariegos  DATE OF SERVICE: 5/31/22     CC events noted -  sp RRT/ code stroke  yesterday for AMS - transferred to NSICU   also with + pccult- anemic- currently getting PRBCs       REVIEW OF SYSTEMS:  DEVANG given mental status     PHYSICAL EXAM:  T(C): 36.6 (05-31-22 @ 07:00), Max: 36.9 (05-31-22 @ 00:44)  HR: 66 (05-31-22 @ 10:15) (64 - 103)  BP: 80/51 (05-31-22 @ 09:00) (80/51 - 150/109)  RR: 20 (05-31-22 @ 10:15) (14 - 26)  SpO2: 100% (05-31-22 @ 09:30) (92% - 100%)  Wt(kg): --  I&O's Summary    31 May 2022 07:01  -  31 May 2022 11:14  --------------------------------------------------------  IN: 104.4 mL / OUT: 0 mL / NET: 104.4 mL        Appearance: Normal	  Cardiovascular: Normal S1 S2,RRR, No JVD, No murmurs  Respiratory: Lungs clear to auscultation	  Gastrointestinal:  Soft, Non-tender, + BS	  Extremities: Normal range of motion, No clubbing, cyanosis or edema      Home Medications:  allopurinol 100 mg oral tablet: 1 tab(s) orally once a day (03 Apr 2022 06:34)  aspirin 81 mg oral delayed release tablet: 1 tab(s) orally once a day (03 Apr 2022 06:34)  bumetanide 2 mg oral tablet: 1 tab(s) orally once a day (03 Apr 2022 06:34)  insulin glargine 100 units/mL subcutaneous solution: 25 unit(s) subcutaneous once a day (at bedtime) (03 Apr 2022 06:34)  metOLazone 5 mg oral tablet: 1 tab(s) orally 3 times a week (03 Apr 2022 06:34)  metoprolol succinate 50 mg oral tablet, extended release: 2 tab(s) orally once a day (03 Apr 2022 06:34)  NovoLOG 100 units/mL subcutaneous solution: subcutaneous 4 times a day (before meals and at bedtime) (03 Apr 2022 06:34)  NovoLOG FlexPen 100 units/mL injectable solution: 10 unit(s) subcutaneous 3 times a day (03 Apr 2022 06:34)  oxycodone-acetaminophen 5 mg-325 mg oral tablet: 1 tab(s) orally 2 times a day (03 Apr 2022 06:34)  Pradaxa 150 mg oral capsule: 1 cap(s) orally 2 times a day (03 Apr 2022 06:34)  pregabalin 100 mg oral capsule: 1 cap(s) orally 3 times a day (03 Apr 2022 06:34)      MEDICATIONS  (STANDING):  allopurinol 100 milliGRAM(s) Oral daily  apixaban 5 milliGRAM(s) Oral two times a day  aspirin  chewable 81 milliGRAM(s) Oral daily  atorvastatin 40 milliGRAM(s) Oral at bedtime  chlorhexidine 4% Liquid 1 Application(s) Topical <User Schedule>  clopidogrel Tablet 75 milliGRAM(s) Oral daily  insulin lispro (ADMELOG) corrective regimen sliding scale   SubCutaneous every 6 hours  lidocaine   4% Patch 1 Patch Transdermal daily  metoprolol tartrate 12.5 milliGRAM(s) Oral every 12 hours  mupirocin 2% Ointment 1 Application(s) Both Nostrils two times a day  Nephro-reena 1 Tablet(s) Oral daily  ofloxacin 0.3% Solution 1 Drop(s) Right EYE four times a day  pantoprazole  Injectable 40 milliGRAM(s) IV Push every 12 hours  phenylephrine    Infusion 0.5 MICROgram(s)/kG/Min (22.7 mL/Hr) IV Continuous <Continuous>  polyethylene glycol 3350 17 Gram(s) Oral two times a day  povidone iodine 10% Solution 1 Application(s) Topical daily  senna 2 Tablet(s) Oral at bedtime  sucralfate 1 Gram(s) Oral every 12 hours      TELEMETRY: 	 afib hr 80s   ECG:  	  RADIOLOGY:   < from: CT Angio Neck w/ IV Cont (05.30.22 @ 23:33) >    ACC: [id number] EXAM:  CT PERFUSION W MAPS IC                        ACC: [id number] EXAM:  CT ANGIO BRAIN (W)AW IC                        ACC: [id number] EXAM:  CT ANGIO NECK (W)AW IC                          PROCEDURE DATE:  05/30/2022          INTERPRETATION:  HISTORY:  Code stroke    COMPARISON: CT angiography head and neck at 5/12/2022    TECHNIQUE: CT angiogram of the neck and brain after administration of   intravenous contrast. MIP and 3D reconstructions were performed.    Perfusion CT through the brain obtained during separate bolus injection   of intravenous contrast.  Processing of the CT perfusion data,   specifically, maps of mean transit time, cerebral blood flow and cerebral   blood volume were generated using ischemia RAPID.    Total of 70 cc Omnipaque 300 intravenous contrast were administered   without complication. 0 cc intravenous contrast discarded.    FINDINGS:    CT PERFUSION:    CT perfusion data is incorrect due to patient motion. RAPID data   processing failed secondary to no suitable AIF location found.      CTA NECK:    Arterial opacification is not optimal.    Three-vessel aortic arch. Atherosclerotic occasions of the aortic arch   and involving the proximal great vessels. Right IJ approach central   venous catheter. Common carotid arteries are patent, however, short   segment severe stenoses of the mid to distal common carotid arteries   bilaterally (right worse than left) secondary to intimal plaque and   atherosclerotic calcification. Complete occlusionof the cervical left   internal carotid artery just distal to the origin. Cervical right   internal carotid artery is patent but overall diminutive. Cervical   vertebral arteries are patent from the origins to the skull base. The   left vertebral artery is dominant.    The regional soft tissues of the neck are otherwise unremarkable. The   lung apices demonstrate a moderate-sized left pleural effusion. There are   degenerative changes of the spine.      CTA BRAIN:    The skull base, petrous, and cavernous segments of the left internal   carotid artery are occluded. There is reconstitution of flow in the   supraclinoid segment. The skull base and intracranial right internal   carotid artery is patent, however, there is short segment moderate   stenoses of the cavernous and supraclinoid segments. The proximal MCAs   and ACAs are patent without high-grade stenosis. The anterior   communicating artery is visualized. Distal MCA and KESHAWN branches are   grossly symmetric.    The skull base and intradural vertebral arteries and basilar artery are   patent without high-grade stenosis. There are short segment mild stenoses   of the basilar artery. The proximal PCAs are patent without significant   stenosis. There are short segment mild stenoses of the proximal PCAs. The   left posterior communicating artery is visualized. The superior   cerebellar artery origins, a right AICA /PICA, a left AICA, and a left   PICA are identified.    There is no evidence of an intracranial arterial aneurysm or  arteriovenous malformation on CTA.      IMPRESSION:    CT PERFUSION: CT perfusion data is incorrect due to patient motion. RAPID   data processing failed secondary to no suitable AIF location found.    CTA NECK: Occlusion of the left internal carotid artery just distal to   the origin as seen on prior exam.    Short segment severe stenoses of the mid to distal common carotid   arteries bilaterally (right worse than left).    Patent cervical vertebral arteries.    CTA BRAIN: Skull base, petrous,cavernous segments of the left internal   carotid artery are occluded with reconstitution of flow in the   supraclinoid segment.    Atherosclerotic calcifications of the cavernous and clinoid segments of   the internal carotid arteries with resultantmoderate stenoses.    Short segment mild stenoses of the basilar artery and short segment mild   stenoses of the proximal PCAs.    --- End of Report ---        < end of copied text >    DIAGNOSTIC TESTING:  [ ] Echocardiogram:  [ ]  Catheterization:  [ ] Stress Test:    OTHER: 	    LABS:	 	    Troponin T, High Sensitivity Result: 226 ng/L [0 - 51] (05-31 @ 02:28)                          6.4    19.15 )-----------( 407      ( 31 May 2022 09:40 )             22.3     05-31    135  |  94<L>  |  68<H>  ----------------------------<  232<H>  3.9   |  22  |  6.99<H>    Ca    8.5      31 May 2022 02:28  Phos  3.2     05-31  Mg     2.1     05-31    TPro  6.1  /  Alb  2.9<L>  /  TBili  0.4  /  DBili  x   /  AST  12  /  ALT  <5<L>  /  AlkPhos  121<H>  05-30    PT/INR - ( 31 May 2022 02:35 )   PT: 28.5 sec;   INR: 2.46 ratio         PTT - ( 31 May 2022 02:35 )  PTT:33.2 sec

## 2022-05-31 NOTE — PROGRESS NOTE ADULT - SUBJECTIVE AND OBJECTIVE BOX
Follow-up Pulm Progress Note    overnight events noted - RRT/code stroke, now tx to NSCU, plans for angiogram with possible stent  pt lethargic, speech garbled   Sats 100% on 2LNC - pt reportedly not hypoxic and was placed on nasal cannula for transport to MRI      Medications:  MEDICATIONS  (STANDING):  allopurinol 100 milliGRAM(s) Oral daily  apixaban 5 milliGRAM(s) Oral two times a day  aspirin  chewable 81 milliGRAM(s) Oral daily  atorvastatin 40 milliGRAM(s) Oral at bedtime  chlorhexidine 4% Liquid 1 Application(s) Topical <User Schedule>  clopidogrel Tablet 75 milliGRAM(s) Oral daily  insulin lispro (ADMELOG) corrective regimen sliding scale   SubCutaneous every 6 hours  lidocaine   4% Patch 1 Patch Transdermal daily  metoprolol tartrate 12.5 milliGRAM(s) Oral every 12 hours  mupirocin 2% Ointment 1 Application(s) Both Nostrils two times a day  Nephro-reena 1 Tablet(s) Oral daily  ofloxacin 0.3% Solution 1 Drop(s) Right EYE four times a day  pantoprazole  Injectable 40 milliGRAM(s) IV Push every 12 hours  phenylephrine    Infusion 0.5 MICROgram(s)/kG/Min (22.7 mL/Hr) IV Continuous <Continuous>  polyethylene glycol 3350 17 Gram(s) Oral two times a day  povidone iodine 10% Solution 1 Application(s) Topical daily  senna 2 Tablet(s) Oral at bedtime  sucralfate 1 Gram(s) Oral every 12 hours    MEDICATIONS  (PRN):  bisacodyl 5 milliGRAM(s) Oral every 12 hours PRN Constipation          Vital Signs Last 24 Hrs  T(C): 36.6 (31 May 2022 07:00), Max: 36.9 (31 May 2022 00:44)  T(F): 97.9 (31 May 2022 07:00), Max: 98.4 (31 May 2022 00:44)  HR: 66 (31 May 2022 10:15) (64 - 103)  BP: 80/51 (31 May 2022 09:00) (80/51 - 150/109)  BP(mean): 60 (31 May 2022 09:00) (60 - 123)  RR: 20 (31 May 2022 10:15) (14 - 26)  SpO2: 100% (31 May 2022 09:30) (92% - 100%)    ABG - ( 30 May 2022 22:38 )  pH, Arterial: 7.48  pH, Blood: x     /  pCO2: 35    /  pO2: 85    / HCO3: 26    / Base Excess: 2.5   /  SaO2: 98.6              VBG pH 7.42 05-30 @ 23:02    VBG pCO2 45 05-30 @ 23:02    VBG O2 sat 40.8 05-30 @ 23:02    VBG lactate -- 05-30 @ 23:02            LABS:                        6.4    19.15 )-----------( 407      ( 31 May 2022 09:40 )             22.3     05-31    135  |  94<L>  |  68<H>  ----------------------------<  232<H>  3.9   |  22  |  6.99<H>    Ca    8.5      31 May 2022 02:28  Phos  3.2     05-31  Mg     2.1     05-31    TPro  6.1  /  Alb  2.9<L>  /  TBili  0.4  /  DBili  x   /  AST  12  /  ALT  <5<L>  /  AlkPhos  121<H>  05-30          CAPILLARY BLOOD GLUCOSE  211 (30 May 2022 22:56)      POCT Blood Glucose.: 215 mg/dL (31 May 2022 05:56)    PT/INR - ( 31 May 2022 02:35 )   PT: 28.5 sec;   INR: 2.46 ratio         PTT - ( 31 May 2022 02:35 )  PTT:33.2 sec    Procalcitonin, Serum: 0.61 ng/mL (05-31-22 @ 02:28)    Serum Pro-Brain Natriuretic Peptide: 9179 pg/mL (05-31-22 @ 02:28)          Physical Examination:  PULM: decreased BS L base   CVS: S1, S2 heard    RADIOLOGY REVIEWED  CXR: L pl effusion     CT chest: < from: CT Chest No Cont (05.27.22 @ 18:46) >  FINDINGS:    LUNGS/PLEURA/AIRWAYS: Patent central airways.  Moderate left pleural   effusion, increased from prior exam, associated with near complete   atelectasis of the left lower lobe. Small right pleural effusion with   adjacent subsegmental atelectasis. Right-sided pleural thickening not   significantly changed from prior exam. Bilateral calcified granulomas.  MEDIASTINUM AND STEPHON: No large mediastinal lymph nodes.  VESSELS: Atherosclerotic calcification of the aorta and its branches.   Right IJ central venous catheter with tip terminating at the superior   cavoatrial junction.  HEART: Cardiomegaly. No pericardial effusion.  CHEST WALL AND LOWER NECK: Within normal limits.  VISUALIZED UPPER ABDOMEN: Unchanged nonspecific bilateral perinephric   stranding.  BONES: Degenerative changes of the spine.    IMPRESSION:  Interval increase in left pleural effusion with near complete atelectasis   of the left lower lobe. Stable small right pleural effusion and pleural   thickening.      < end of copied text >

## 2022-05-31 NOTE — CONSULT NOTE ADULT - ASSESSMENT
8 y/o M--followed by his nephrologist above--patient with a history of CAD with past multiple PCI, with most recent discharge from [facility] in Feb 2022 for non ST elevation MI with LULU of an 85% prox LAD lesion with patient on ASA and now off Plavix per cardiology (only for one month), chronic atrial fibrillation maintained on Pradaxa, essential HTN, type 2 DM previously on oral medications but on insulin, diabetic neuropathy with chronic RIGHT LE venous stasis changes>left, LEFT foot ulcer seen by podiatry, past endarterectomy LEFT CEA in 2014. with patient self referring to the ER following apparently treatment by an urgent care center for an apparent cystitis with hematuria on nitrofurantoin but with patient noting decreased urine output for the past week, and difficulty with B/L coarse tremors for several weeks, with patient having difficulty negotiating his applications on his smart phone (observed by examiner).  Patient with increasing B/L LE oedema and weight gain, with patient with 2 pillow orthopnoea.  (03 Apr 2022 06:29)      Case discussed with Dr. Gideon Pruett, PAC  cell: [phone]  long range pager: [phone]  Covering for Department of Gastroenterology  [facility]  [phone]    After hours/weekend coverage [phone]

## 2022-05-31 NOTE — CONSULT NOTE ADULT - SUBJECTIVE AND OBJECTIVE BOX
Wound Surgery Consult Note:    HPI:  NIGHT HOSPITALIST:   Patient remotely known to me from an admission to [facility] in Feb 2017--assigned to me at this point by the ER to admit this 66 y/o M--followed by his nephrologist above--patient with a history of CAD with past multiple PCI, with most recent discharge from [facility] in Feb 2022 for non ST elevation MI with LULU of an 85% prox LAD lesion with patient on ASA and now off Plavix per cardiology (only for one month), chronic atrial fibrillation maintained on Pradaxa, essential HTN, type 2 DM previously on oral medications but on insulin, diabetic neuropathy with chronic RIGHT LE venous stasis changes>left, LEFT foot ulcer seen by podiatry, past endarterectomy LEFT CEA in 2014. with patient self referring to the ER following apparently treatment by an urgent care center for an apparent cystitis with hematuria on nitrofurantoin but with patient noting decreased urine output for the past week, and difficulty with B/L coarse tremors for several weeks, with patient having difficulty negotiating his applications on his smart phone (observed by examiner).  Patient with increasing B/L LE oedema and weight gain, with patient with 2 pillow orthopnoea.    Patient with past history of COVID-19 in 12/2021 and has received the COVID-19 vaccine x 2. (03 Apr 2022 06:29)    Request for wound care consult for RIght posterior lower leg wound received from nursing. Superficial wound noted with small amount of serosanguinous drainage at this time. No significant BLE edema noted. MARICARMEN/PVR and BLE dopplers reviewed which showed peripheral vascular disease and no DVT. Given the location and past history of edema and PVD, this wound does not appear consistent with pressure injury.    PAST MEDICAL & SURGICAL HISTORY:  HTN (hypertension), benign  HLD (hyperlipidemia)  DM type 2 (diabetes mellitus, type 2)  TIA (transient ischemic attack)  Atrial fibrillation  MI (myocardial infarction), circa 2014 and 2022.  CAD (coronary artery disease)  Neuropathy  Stage 3 chronic kidney disease  2019 novel coronavirus disease (COVID-19), 12/2021.  Received the COVID-19 vaccine x 2 as of April 2022.  Status post angioplasty with stent, LULU x 3 2/7/2014  S/P carotid endarterectomy, left    MEDICATIONS  (STANDING):  allopurinol 100 milliGRAM(s) Oral daily  apixaban 5 milliGRAM(s) Oral two times a day  aspirin  chewable 81 milliGRAM(s) Oral daily  atorvastatin 40 milliGRAM(s) Oral at bedtime  chlorhexidine 4% Liquid 1 Application(s) Topical <User Schedule>  clopidogrel Tablet 75 milliGRAM(s) Oral daily  insulin lispro (ADMELOG) corrective regimen sliding scale   SubCutaneous every 6 hours  lidocaine   4% Patch 1 Patch Transdermal daily  metoprolol tartrate 12.5 milliGRAM(s) Oral every 12 hours  mupirocin 2% Ointment 1 Application(s) Both Nostrils two times a day  Nephro-reena 1 Tablet(s) Oral daily  ofloxacin 0.3% Solution 1 Drop(s) Right EYE four times a day  pantoprazole  Injectable 40 milliGRAM(s) IV Push every 12 hours  phenylephrine    Infusion 0.5 MICROgram(s)/kG/Min (22.7 mL/Hr) IV Continuous <Continuous>  polyethylene glycol 3350 17 Gram(s) Oral two times a day  povidone iodine 10% Solution 1 Application(s) Topical daily  senna 2 Tablet(s) Oral at bedtime  sucralfate 1 Gram(s) Oral every 12 hours    MEDICATIONS  (PRN):  bisacodyl 5 milliGRAM(s) Oral every 12 hours PRN Constipation    Allergies    nitrofurantoin (Nephrotoxicity)    Intolerances    Vital Signs Last 24 Hrs  T(C): 36.6 (31 May 2022 11:00), Max: 36.9 (31 May 2022 00:44)  T(F): 97.9 (31 May 2022 11:00), Max: 98.4 (31 May 2022 00:44)  HR: 82 (31 May 2022 11:45) (57 - 115)  BP: 80/51 (31 May 2022 09:00) (80/51 - 150/109)  BP(mean): 60 (31 May 2022 09:00) (60 - 123)  RR: 17 (31 May 2022 11:45) (14 - 26)  SpO2: 100% (31 May 2022 11:45) (92% - 100%)    Physical Exam:  General: sleeping, obese  Respiratory: no SOB on supplemental O2  Gastrointestinal: soft NT/ND  Neurology: nonverbal, not following commands  Musculoskeletal: no contractures  Vascular: BLE edema equal  Skin: RIght posterior lower leg wound superficially denuded skin L 2.5cm X W 2.5cm xD 0.1cm with pink wound bed, no necrotic tissue, and scant serosanguinous drainage, no periwound erythema, odor, tenderness, increased warmth, fluctuance, induration      LABS:  05-31    135  |  94<L>  |  68<H>  ----------------------------<  232<H>  3.9   |  22  |  6.99<H>    Ca    8.5      31 May 2022 02:28  Phos  3.2     05-31  Mg     2.1     05-31    TPro  6.1  /  Alb  2.9<L>  /  TBili  0.4  /  DBili  x   /  AST  12  /  ALT  <5<L>  /  AlkPhos  121<H>  05-30                          6.4    19.15 )-----------( 407      ( 31 May 2022 09:40 )             22.3     PT/INR - ( 31 May 2022 02:35 )   PT: 28.5 sec;   INR: 2.46 ratio         PTT - ( 31 May 2022 02:35 )  PTT:33.2 sec      RADIOLOGY & ADDITIONAL STUDIES:  EXAM:  DUPLEX SCAN EXT VEINS LOWER BI                          PROCEDURE DATE:  02/22/2022          INTERPRETATION:  CLINICAL INFORMATION: Short of breath, lower extremity   swelling    COMPARISON: A prior examination, dated 7/30/2020, showed no DVT    TECHNIQUE: Duplex sonography of the BILATERAL LOWER extremity veins with   color and spectral Doppler, with and without compression.    FINDINGS:    RIGHT:  Normal compressibility of the RIGHT common femoral, femoral and popliteal   veins.  Doppler examination shows normal spontaneous and phasic flow.  No RIGHT calf vein thrombosis is detected.    LEFT:  Normal compressibility of the LEFT common femoral, femoral and popliteal   veins.  Doppler examination shows normal spontaneous and phasic flow.  No LEFT calf vein thrombosis is detected.    IMPRESSION:  No evidence of deep venous thrombosis in either lower extremity.    EXAM:  PHYSIOL EXTREM LOW 3+ LEV BI                        PROCEDURE DATE:  07/30/2020      INTERPRETATION:  CLINICAL INFORMATION: Diabetes mellitus. Hypertension. History of MI. History of CVA. Stents. Left carotid endarterectomy. Patient presents with nonhealing ulcer right ankle.. Bilateral claudication. Right rest pain.    FINDINGS: Ankle/brachial index is not obtained on the right and 0.66 on the left. The right digital brachial index is 0.42. Left digital brachial index is 0.34.    Pressure could not be obtained at the right high thigh level due to noncompressibility. Pressures were not obtained at the right calf and right ankle.    There is moderate decrease in the amplitude of the waveforms at the right thigh. This is seen through the right metatarsal level with moderate to severe abnormality noted at the PVR and PPG waveforms at the right first toe.    This is a segmental pressure gradient decrease in the pressures between the left calf and left ankle. There is mild decrease in the amplitude of the waveforms obtained at the left thigh with mild to moderate decrease at the left calf and moderate decrease at the left ankle, and the tarsal and left toe levels.    IMPRESSION:    Definitive significant disease is noted at the right aorto iliac/iliofemoral level. Additional small vessel disease is noted at the right first toe. The right digital brachial index is 0.42.    Mild disease is noted at the left aorto iliac/iliofemoral level. Cannot exclude additional disease at the left popliteal tibial level. Definitive disease at the left trifurcation. The left ankle-brachial index is 0.66. The left digital brachial index is 0.34.

## 2022-05-31 NOTE — PROGRESS NOTE ADULT - ASSESSMENT
ESRD.co hemodialysis per renal  to cont as per renal    s/p CVA / acute infarcts  - Neurology follow    Chronic atrial fibrillation. c/w a/c  cards f/u     GI bleed  - PPI  - GI follow    Cystitis.   resolved    Type 2 diabetes mellitus. c/w insulin   endo f/u     CAD (coronary artery disease).   rapid a fib better  meds adjusted  hr stable  c/w a/c     carotid duplex with stenosis   cta noted  mra/ mri noted  tx plans as per vasc / neuro   neuro f/u noted  Angiogram pending   Eliquis to continue as per NSx.    pl effusion   cxr monday and possible tap     NGT feed     DVT prophylaxis    NSCU care    d/w wife and daughter  at bedside    Javier Arce MD phone [phone]

## 2022-05-31 NOTE — PROGRESS NOTE ADULT - ASSESSMENT
ASSESSMENT/PLAN:    NEURO:  ?TIA ?hypoperfusion ?encephalopathy   neuro IR following  cont ASA/plavix/Eliquis  BP augmentation -180    Activity: [] mobilize as tolerated [x] Bedrest [] PT [] OT [] PMNR    PULM: monitor respiratory status  aspiration precaution   CXR    CV:   CAD--hold cardizem, d/c 100mg toprol XL keep metoprolol 12.5mg bid, if requiring onesimo, then hold that also  arterial line placement now  cont atplt/ac as above, cont statin   SBP goal 120-180  phenylephrine prn   check trops, BNP    RENAL:  Fluids: IVL   ESRD on HD, got CT contrast overnight, renal aware    GI:  Diet: NPO  needs NGT placement   GI prophylaxis [x] not indicated [] PPI [] other:  Bowel regimen [] colace [x] senna [x] other: miralax    ENDO:   Goal euglycemia (-180)    HEME/ONC:  VTE prophylaxis: [x] SCDs [x] chemoprophylaxis on Eliquis [] hold chemoprophylaxis due to: [] high risk of DVT/PE on admission due to:  downtrending H/H, f/u hemolysis panel, FOBT    ID: hypothermic earlier today, worsening leukocytosis over last few days  UA/ucx/bcx/CXR now  proc     MISC:     SOCIAL/FAMILY:  [] awaiting [x] updated regarding transfer [] family meeting    CODE STATUS:  [x] Full Code [] DNR [] DNI [] Palliative/Comfort Care    DISPOSITION:  [x] ICU [] Stroke Unit [] Floor [] EMU [] RCU [] PCU    [x] Patient is at high risk of neurologic deterioration/death due to: sepsis due to unknown organism, hypotension, cerebral hypoperfusion, acute stroke, altered mental status, respiratory failure requiring intubation     Contact: [phone] ASSESSMENT/PLAN: R carotid stenosis with watershed infarcts on MRI  +FOBT ?UGIB    NEURO:  neuro IR/vascular surgery following plan for CEA vs MITESH  cont ASA/plavix--d/c/ Eliquis-->2.5mg bid  BP augmentation -180  Activity: [] mobilize as tolerated [x] Bedrest [] PT [] OT [] PMNR    PULM: monitor respiratory status  aspiration precaution   HOB >45    CV:   CAD/afib--hold cardizem, d/c 100mg toprol XL keep metoprolol 12.5mg bid--on hold  requiring phenylephrine   arterial line  cont atplt/ac as above, cont statin   SBP goal 120-180  check trops, BNP 9100    RENAL:  Fluids: IVL   ESRD on HD today  renal following     GI:  Diet: NPO for ?UGIB, GI consulted, EGD tomorrow  needs NGT placement  GI prophylaxis [] not indicated [x] PPI bid [x] other: sucralfate   Bowel regimen [] colace [x] senna [x] other: miralax    ENDO:   Goal euglycemia (-180)  MARGARITA    HEME/ONC:  VTE prophylaxis: [x] SCDs [x] chemoprophylaxis on Eliquis [] hold chemoprophylaxis due to: [] high risk of DVT/PE on admission due to:  2U PRBC 5/31-->check post transfusion CBC    ID: afebrile/no hypothermia  montior abx, f/u bcxs  anuric   proc 0.61    MISC:     SOCIAL/FAMILY:  [] awaiting [x] updated at bedside--daughter  [] family meeting    CODE STATUS:  [x] Full Code [] DNR [] DNI [] Palliative/Comfort Care    DISPOSITION:  [x] ICU [] Stroke Unit [] Floor [] EMU [] RCU [] PCU    [x] Patient is at high risk of neurologic deterioration/death due to: sepsis due to unknown organism, UGIB, acute blood loss anemia, hypotension, cerebral hypoperfusion, acute stroke, altered mental status, respiratory failure requiring intubation     Contact: [phone]

## 2022-05-31 NOTE — CONSULT NOTE ADULT - ASSESSMENT
Impression:    Right Posterior lower leg ulceration  PVD    Recommend:  1.) topical therapy: Left lower leg wound - cleanse/scrub with NS, pat dry, apply allevyn foam dressing every other day  2.) Emollient therapy - Moisturize BLE intact skin with Sween24 yesenia  3.) BLE elevation  4.) Foot wound per Podiatry  5.) Nutrition optimization  6.) Offload heels/feet with complete cair air fluidized boots; ensure that the soles of the feet are not resting on the foot board of the bed.    Care as per medicine. Will follow with you.  Upon [facility] [address] [place] [address] [phone]  Seen and discussed with clinical nurse  Thank you for this consult  Melly Duvall, JOYCE-C, CWOCN [id number]

## 2022-05-31 NOTE — CONSULT NOTE ADULT - NS ATTEND AMEND GEN_ALL_CORE FT
77y M with significant CAD/PCI adm with orthopnea and edema  consulted for anemia  GI work up after stabilization of his cardiac condtion
O>I w HD  obtain CT chest  keep sat>90% w o2 as needed

## 2022-05-31 NOTE — PROGRESS NOTE ADULT - NS ATTEND AMEND GEN_ALL_CORE FT
above events noted. case d/w pt team overnight and when seen this afternoon. also d/w pt daughter later in the day as well   hypotension//AMS /anemia   he is on onesimo drip for bp is receiving prbc. neuro angio is on hold now due to his severe anemia  h d as planned 225 min 300 cc bfr dfr 600 no fluid removal

## 2022-06-01 NOTE — PROGRESS NOTE ADULT - PROBLEM SELECTOR PLAN 1
Likely 2nd to b/l pl effusions, atelectasis  -Suspect fluid overload given elevated proBNP, LE edema on admission   -Keep O>I with HD as tolerated   -Pt with hx of hydroPTX. CT chest in 2/2022 with pleural thickening, atelectasis. Findings at the time suspected to be chronic. CT A/P 4/2 with small b/l pl effusions L>R, pleural thickening  -CT chest 4/4 with small b/l pl effusion R>L, bibasilar atelectasis  -Appeared mildly labored last week. CT chest 5/27 with no PNA, but with increase in L pl effusion. No longer with labored breathing, no plans for thora. Suggest optimization of fluid removal with HD.   -Keep sats >90% with supplemental O2 PRN  -Suction PRN Likely 2nd to b/l pl effusions, atelectasis  -Suspect fluid overload given elevated proBNP, LE edema on admission   -Keep O>I with HD as tolerated   -Pt with hx of hydroPTX. CT chest in 2/2022 with pleural thickening, atelectasis. Findings at the time suspected to be chronic. CT A/P 4/2 with small b/l pl effusions L>R, pleural thickening  -CT chest 4/4 with small b/l pl effusion R>L, bibasilar atelectasis  -Appeared mildly labored last week. CT chest 5/27 with no PNA, but with increase in L pl effusion. No longer with labored breathing, no plans for thoracentesis. Suggest optimization of fluid removal with HD.   -Keep sats >90% with supplemental O2 PRN  -Suction PRN

## 2022-06-01 NOTE — PROGRESS NOTE ADULT - SUBJECTIVE AND OBJECTIVE BOX
[facility]   [phone]  RENAL FOLLOW UP NOTE  --------------------------------------------------------------------------------  Chief Complaint:    24 hour events/subjective:    seen earlier   a bit more responsive   but still overall lethargic   daughter at bedside     PAST HISTORY  --------------------------------------------------------------------------------  No significant changes to PMH, PSH, FHx, SHx, unless otherwise noted    ALLERGIES & MEDICATIONS  --------------------------------------------------------------------------------  Allergies    nitrofurantoin (Nephrotoxicity)    Intolerances      Standing Inpatient Medications  allopurinol 100 milliGRAM(s) Oral daily  apixaban 2.5 milliGRAM(s) Oral two times a day  aspirin  chewable 81 milliGRAM(s) Oral daily  atorvastatin 40 milliGRAM(s) Oral at bedtime  chlorhexidine 4% Liquid 1 Application(s) Topical <User Schedule>  insulin lispro (ADMELOG) corrective regimen sliding scale   SubCutaneous every 6 hours  lidocaine   4% Patch 1 Patch Transdermal daily  metoprolol tartrate 12.5 milliGRAM(s) Oral every 12 hours  Nephro-reena 1 Tablet(s) Oral daily  ofloxacin 0.3% Solution 1 Drop(s) Right EYE four times a day  pantoprazole Infusion 8 mG/Hr IV Continuous <Continuous>  phenylephrine    Infusion 0.5 MICROgram(s)/kG/Min IV Continuous <Continuous>  polyethylene glycol 3350 17 Gram(s) Oral two times a day  povidone iodine 10% Solution 1 Application(s) Topical daily  senna 2 Tablet(s) Oral at bedtime  sucralfate 1 Gram(s) Oral every 12 hours    PRN Inpatient Medications  bisacodyl 5 milliGRAM(s) Oral every 12 hours PRN      REVIEW OF SYSTEMS  --------------------------------------------------------------------------------  not giving ros     VITALS/PHYSICAL EXAM  --------------------------------------------------------------------------------  T(C): 37 (06-01-22 @ 19:00), Max: 37 (06-01-22 @ 19:00)  HR: 114 (06-01-22 @ 19:00) (73 - 134)  BP: 112/39 (06-01-22 @ 13:35) (112/39 - 149/51)  RR: 18 (06-01-22 @ 19:00) (15 - 33)  SpO2: 97% (06-01-22 @ 19:00) (86% - 100%)  Wt(kg): --  Height (cm): 180.3 (05-31-22 @ 23:44)  Weight (kg): 120.9 (05-31-22 @ 23:44)  BMI (kg/m2): 37.2 (05-31-22 @ 23:44)  BSA (m2): 2.38 (05-31-22 @ 23:44)      05-31-22 @ 07:01  -  06-01-22 @ 07:00  --------------------------------------------------------  IN: 2178.3 mL / OUT: 0 mL / NET: 2178.3 mL    06-01-22 @ 07:01  -  06-01-22 @ 20:32  --------------------------------------------------------  IN: 233.7 mL / OUT: 0 mL / NET: 233.7 mL      Physical Exam:  	    	              Gen: lethargic   	Pulm: Decreased breath sounds b/l bases.  	CV: No JVD. IRR, +arterial line  	Abd: +BS, soft, nontender, softly distended, obese, +NGT               Extremity no edema              Extremity LE: + hyperpigmented bilateral LE with no edema              Vascular: R IJ HD catheter, L arm AVF       LABS/STUDIES  --------------------------------------------------------------------------------              7.9    16.41 >-----------<  288      [06-01-22 @ 14:14]              26.2     135  |  97  |  26  ----------------------------<  121      [05-31-22 @ 22:52]  3.5   |  25  |  3.41        Ca     8.4     [05-31-22 @ 22:52]      Mg     1.8     [05-31-22 @ 22:52]      Phos  1.6     [05-31-22 @ 22:52]    TPro  6.1  /  Alb  2.9  /  TBili  0.4  /  DBili  x   /  AST  12  /  ALT  <5  /  AlkPhos  121  [05-30-22 @ 22:40]    PT/INR: PT 25.7 , INR 2.20       [06-01-22 @ 05:11]  PTT: 32.5       [06-01-22 @ 05:11]          [05-31-22 @ 02:28]    Creatinine Trend:  SCr 3.41 [05-31 @ 22:52]  SCr 6.99 [05-31 @ 02:28]  SCr 6.75 [05-30 @ 22:40]  SCr 5.05 [05-29 @ 16:28]  SCr 5.94 [05-28 @ 13:08]                  Iron 21, TIBC 245, %sat 9      [05-17-22 @ 05:54]  Ferritin 120      [05-17-22 @ 05:54]  PTH -- (Ca 8.1)      [05-28-22 @ 13:06]   96  PTH -- (Ca 8.5)      [05-24-22 @ 14:22]   84  PTH -- (Ca 8.3)      [04-15-22 @ 12:18]   150  PTH -- (Ca --)      [04-03-22 @ 23:06]   97  HbA1c 11.7      [11-07-19 @ 09:14]  TSH 1.71      [05-12-22 @ 07:11]       DISPLAY PLAN FREE TEXT

## 2022-06-01 NOTE — PROGRESS NOTE ADULT - ASSESSMENT
ESRD.co hemodialysis per renal  to cont as per renal    s/p CVA / acute infarcts  - Neurology follow up    Chronic atrial fibrillation. c/w a/c  cards f/u     GI bleed  - PPI  - GI follow up   egd today     Cystitis.   resolved    Type 2 diabetes mellitus. c/w insulin   endo f/u     CAD (coronary artery disease).   rapid a fib better  meds adjusted  hr stable  c/w a/c     carotid duplex with stenosis   cta noted  mra/ mri noted  tx plans as per vasc / neuro   neuro f/u noted  Angiogram pending     pl effusion   pulm f/u    NGT feed     DVT prophylaxis    NSCU care    d/w  daughter  at bedside

## 2022-06-01 NOTE — PROGRESS NOTE ADULT - ASSESSMENT
67M w/ prolonged hospital course on medicine service iso renal failure req dialysis c/b b/l hypoperfusions strokes from R ICA stenosis 70-80% and L ICA occlusion (prior CEA) planned for BOOKER stent but course c/b acute uremia , GIB , and additional hypoperfusion episode and xfer to nscu. Planned for egd today and possible stent vs CEA to follow.   - NSCU q1h neuro checks  - -180  - s/p 2U prbc fu cbc  - EGD in AM, fu GI recs  - may go with CEA /vasc surg  - fu cultures for hypothermia  - holding plavix, cont ASA/eliquis for afib

## 2022-06-01 NOTE — PROGRESS NOTE ADULT - SUBJECTIVE AND OBJECTIVE BOX
CARDIOLOGY FOLLOW UP - Dr. Mazariegos  DATE OF SERVICE: 6/1/22     CC no cp or sob   more awake today  daughter at bedside       REVIEW OF SYSTEMS:  Limited given mental status     PHYSICAL EXAM:  T(C): 36.7 (06-01-22 @ 07:00), Max: 37.1 (05-31-22 @ 15:00)  HR: 94 (06-01-22 @ 10:00) (73 - 115)  BP: --  RR: 20 (06-01-22 @ 10:00) (15 - 30)  SpO2: 98% (06-01-22 @ 10:00) (92% - 100%)  Wt(kg): --  I&O's Summary    31 May 2022 07:01  -  01 Jun 2022 07:00  --------------------------------------------------------  IN: 2178.3 mL / OUT: 0 mL / NET: 2178.3 mL    01 Jun 2022 07:01  -  01 Jun 2022 11:20  --------------------------------------------------------  IN: 40.8 mL / OUT: 0 mL / NET: 40.8 mL        Appearance: awake 	  Cardiovascular: Normal S1 S2 irreg   Respiratory:  diminished   Gastrointestinal:  Soft, Non-tender, + BS	+ ng tube  Extremities: Normal range of motion, No clubbing, cyanosis or edema      Home Medications:  allopurinol 100 mg oral tablet: 1 tab(s) orally once a day (03 Apr 2022 06:34)  aspirin 81 mg oral delayed release tablet: 1 tab(s) orally once a day (03 Apr 2022 06:34)  bumetanide 2 mg oral tablet: 1 tab(s) orally once a day (03 Apr 2022 06:34)  insulin glargine 100 units/mL subcutaneous solution: 25 unit(s) subcutaneous once a day (at bedtime) (03 Apr 2022 06:34)  metOLazone 5 mg oral tablet: 1 tab(s) orally 3 times a week (03 Apr 2022 06:34)  metoprolol succinate 50 mg oral tablet, extended release: 2 tab(s) orally once a day (03 Apr 2022 06:34)  NovoLOG 100 units/mL subcutaneous solution: subcutaneous 4 times a day (before meals and at bedtime) (03 Apr 2022 06:34)  NovoLOG FlexPen 100 units/mL injectable solution: 10 unit(s) subcutaneous 3 times a day (03 Apr 2022 06:34)  oxycodone-acetaminophen 5 mg-325 mg oral tablet: 1 tab(s) orally 2 times a day (03 Apr 2022 06:34)  Pradaxa 150 mg oral capsule: 1 cap(s) orally 2 times a day (03 Apr 2022 06:34)  pregabalin 100 mg oral capsule: 1 cap(s) orally 3 times a day (03 Apr 2022 06:34)      MEDICATIONS  (STANDING):  allopurinol 100 milliGRAM(s) Oral daily  apixaban 2.5 milliGRAM(s) Oral two times a day  aspirin  chewable 81 milliGRAM(s) Oral daily  atorvastatin 40 milliGRAM(s) Oral at bedtime  chlorhexidine 4% Liquid 1 Application(s) Topical <User Schedule>  insulin lispro (ADMELOG) corrective regimen sliding scale   SubCutaneous every 6 hours  lidocaine   4% Patch 1 Patch Transdermal daily  metoprolol tartrate 12.5 milliGRAM(s) Oral every 12 hours  Nephro-reena 1 Tablet(s) Oral daily  ofloxacin 0.3% Solution 1 Drop(s) Right EYE four times a day  pantoprazole  Injectable 40 milliGRAM(s) IV Push every 12 hours  phenylephrine    Infusion 0.5 MICROgram(s)/kG/Min (22.7 mL/Hr) IV Continuous <Continuous>  polyethylene glycol 3350 17 Gram(s) Oral two times a day  povidone iodine 10% Solution 1 Application(s) Topical daily  senna 2 Tablet(s) Oral at bedtime  sucralfate 1 Gram(s) Oral every 12 hours      TELEMETRY: 	afib hr 90-100s     ECG:  	  RADIOLOGY:   DIAGNOSTIC TESTING:  [ ] Echocardiogram:  [ ]  Catheterization:  [ ] Stress Test:    OTHER: 	    LABS:	 	    Troponin T, High Sensitivity Result: 226 ng/L [0 - 51] (05-31 @ 02:28)                          8.4    19.31 )-----------( 323      ( 01 Jun 2022 05:11 )             26.8     05-31    135  |  97  |  26<H>  ----------------------------<  121<H>  3.5   |  25  |  3.41<H>    Ca    8.4      31 May 2022 22:52  Phos  1.6     05-31  Mg     1.8     05-31    TPro  6.1  /  Alb  2.9<L>  /  TBili  0.4  /  DBili  x   /  AST  12  /  ALT  <5<L>  /  AlkPhos  121<H>  05-30    PT/INR - ( 01 Jun 2022 05:11 )   PT: 25.7 sec;   INR: 2.20 ratio         PTT - ( 01 Jun 2022 05:11 )  PTT:32.5 sec

## 2022-06-01 NOTE — PROGRESS NOTE ADULT - SUBJECTIVE AND OBJECTIVE BOX
Patient seen and examined at bedside.    --Anticoagulation--  apixaban 2.5 milliGRAM(s) Oral two times a day    T(C): 36.7 (06-01-22 @ 03:15), Max: 37.1 (05-31-22 @ 15:00)  HR: 83 (06-01-22 @ 05:00) (57 - 115)  BP: 80/51 (05-31-22 @ 09:00) (80/51 - 80/51)  RR: 26 (06-01-22 @ 05:00) (15 - 30)  SpO2: 100% (06-01-22 @ 05:00) (92% - 100%)  Wt(kg): --    Exam:  Lt EOS, Rt eye closed, AOx1 interm, not FC, RUE 4/5, RLE 2/5, LUE AG, LLE AG    Labs:                        7.8    20.91 )-----------( 327      ( 31 May 2022 22:50 )             26.0     05-31    135  |  97  |  26<H>  ----------------------------<  121<H>  3.5   |  25  |  3.41<H>    Ca    8.4      31 May 2022 22:52  Phos  1.6     05-31  Mg     1.8     05-31    TPro  6.1  /  Alb  2.9<L>  /  TBili  0.4  /  DBili  x   /  AST  12  /  ALT  <5<L>  /  AlkPhos  121<H>  05-30

## 2022-06-01 NOTE — PROGRESS NOTE ADULT - PROBLEM SELECTOR PLAN 3
MR head with multiple small acute infarctions  -Repeat TTE with no changes   -+carotid stenosis   -Neuro sx recs noted, plans for possible angio and stent placement vs. CEA  -Neuro f/u

## 2022-06-01 NOTE — CHART NOTE - NSCHARTNOTEFT_GEN_A_CORE
PRELIMINARY EEG REVIEW    EEG reviewed to 13:27  Date: 06-01-22    - No seizure seen.    This Preliminary report is based on fellow review. Final report pending Completion of study tomorrow morning and following attending review.    Reading Room: [phone]  On Call Service After Hours: [phone]    Artem Strickland MD PGY-5  Epilepsy Fellow

## 2022-06-01 NOTE — PROGRESS NOTE ADULT - ASSESSMENT
ASSESSMENT/PLAN:    NEURO:  Neuro Checks Q 1 hr  neuro IR following  cont ASA/plavix/Eliquis  Possible angio and stenting today   Vascular surgery consult for CEA      PULM:   monitor respiratory status  aspiration precaution   CXR    CV:   CAD--hold cardizem, d/c 100mg toprol XL keep metoprolol 12.5mg bid, if requiring onesimo, then hold that also  arterial line placement now  cont atplt/ac as above, cont statin   SBP goal 120-180  phenylephrine prn   check trops, BNP    RENAL:  Fluids: IVL   ESRD on HD, got CT contrast overnight, renal aware    GI:  Diet: NPO  needs NGT placement   Melena  Upper GI endoscopy   Pantoprazole 40 mg BID     Bowel regimen [] colace [x] senna [x] other: miralax    ENDO:   Goal euglycemia (-180)    HEME/ONC:  Hgb drop  PRBC transfusion  VTE prophylaxis: [x] SCDs     ID:   Monitor for fever     SOCIAL/FAMILY:  [] awaiting [x] updated regarding transfer [] family meeting    CODE STATUS:  [x] Full Code [] DNR [] DNI [] Palliative/Comfort Care    DISPOSITION:  [x] ICU [] Stroke Unit [] Floor [] EMU [] RCU [] PCU    [x] Patient is at high risk of neurologic deterioration/death due to: sepsis due to unknown organism, hypotension, cerebral hypoperfusion, acute stroke, altered mental status, respiratory failure requiring intubation     Contact: [phone]   ASSESSMENT/PLAN:    NEURO:  Neuro Checks Q 1 hr  neuro IR following  ASA 81 mg OD  Eliquis 2.5 mg OD  Angio and stenting VS CEA    PULM:   Wean off nasal canula     CV:   Afib, CAD  SBP goal 120-180  Atorvastatin 40 mg OD  keep metoprolol 12.5mg bid with hold measures   Phenylephrine gtt  Eliquis 2.5 mg BID   trops, BNP    RENAL:  Fluids: IVL   ESRD on HD, Last day yesterday     GI:  Diet: NPO  NGT  Melena  Upper GI endoscopy today    Pantoprazole 40 mg BID     Bowel regimen [] colace [x] senna [x] other: miralax    ENDO:   Goal euglycemia (-180)    HEME/ONC:  Hgb drop  PRBC transfusion  VTE prophylaxis: [x] SCDs     ID:   Monitor for fever     SOCIAL/FAMILY:  [] awaiting [x] updated regarding transfer [] family meeting    CODE STATUS:  [x] Full Code [] DNR [] DNI [] Palliative/Comfort Care    DISPOSITION:  [x] ICU [] Stroke Unit [] Floor [] EMU [] RCU [] PCU    [x] Patient is at high risk of neurologic deterioration/death due to: sepsis due to unknown organism, hypotension, cerebral hypoperfusion, acute stroke, altered mental status, respiratory failure requiring intubation     Contact: [phone]

## 2022-06-01 NOTE — PROGRESS NOTE ADULT - ASSESSMENT
67 year old gentleman with PMH of CAD, NSTEMI s/p 3 stents in 2014 (stents to LAD, proximal and distal LCx), ischemic cardiomyopathy, HTN for 10 years, T2DM for 26 years c/b peripheral neuropathy, CVA, TIA, Afib on Pradaxa, chronic RLE wound, L carotid stenosis s/p endarterectomy (2014) just recently admitted  to the [facility] on 2/19/2022 with acute onset chest pain for one day found to have an NSTEMI, CAD, s/p PCI in setting of increased AF rates off bb for 3 days and resolved chest pain, his CKMB peaked and Echo noted with EF 60%,normal LV function, mild TR, mild PA. He was s/p LHC with 85% proximal LAD s/p balloon angioplasty and LULU. He presented to [facility] ED with decreased urine output over the week. Mr. Stevens went to [facility] for hematuria and was started  on Nitrofurantoin. Pt is still taking Nitrofurantoin w/ 5 days left. He came to the ED due to worsening symptoms. Pt reports having a similar incident 4-5 years ago that resolved spontaneously. He reports increased leg edema Dyspnea worse on exertion. his baseline Serum creatinine is in the 2.0 to 2.3 mg/dl range. ANT with serum creatinine of 8.29 with bun 144 and k 5.5      1- ESRD   2- chf chronic   3- hyperphosphatemia /shpt   4- anemia   5- hyperlipidemia   6- HTN /a fib  7- TIA hx   8- hx of carotid stenosis      hd am   hypophosphatemia supplement phos   hypotension now requiring pressor support  blood cx pending   anemia - required prbc  5/31  epogen 58539 Units TIW with HD.    venofer 100 mg IVP with HD  trend hgb  d/w pt daughter and nscu team

## 2022-06-01 NOTE — PRE PROCEDURE NOTE - NS ATTEND AMEND GEN_ALL_CORE FT
melena, r/o source of UGI bleed, hgb stable, pt hemodynamically stable on anticoagulation, stable for egd    Discussed risks including but not limited to bleeding,infection,drug reaction,perforation requiring surgery,missed lesion, benefits and alternatives of EGD/Colonoscopy with patient's daughter  including no treatment and patient's daughter  consents to procedure.    Plan IV protonix drip           egd today

## 2022-06-01 NOTE — PROGRESS NOTE ADULT - ATTENDING COMMENTS
66 yo man with h/o HTN, DM, ESRD on HD, CAD s/p LULU to LAD, Afib on Pradaxa (CHADs2 vasc 7), diabetic neuropathy with chronic R LE venous stasis changes>left, prior endarterectomy L CEA in 2014, on ASA and Pradaxa at home (no Plavix), admitted 4/3 with worsening oliguria, proceeded to requiring HD this admission, continued to have poor mental status (speaking a few words, following only simple commands) with MRI 5/14 showing b/l watershed strokes, with L ICA occlusion and severe R ICA stenosis, for which he was started on Plavix in addition to ASA and Pradaxa in preparation for carotid stent. With hypotension, hypoxia and new R sided weakness and global aphasia on 5/30, with CT/CTAP stable. Repeat MRI 5/31 with increased watershed strokes.  Found to have an upper GI bleed.     Received another pRBC overnight.  With 2 episodes of melena overnight.  EGD today with oozing duodenal ulcer, another clean based duodenal ulcer and gastritis.     On exam, alert, states his name, speaking in short sentences, follows commands, EOMI, R arm AG to elbow, L arm AG, R leg withdraws 1-2/5, leg 2/5    Assessment: from having multidisciplinary discussions with multiple services, patient is high risk for rebleeding if back on DAPT and AC. Will continue to discuss options for revascularization of the R carotid as patient had developed worsening watershed strokes across this month demonstrating symptomatic hypoperfusion.      q1h NC  VEEG for waxing waning mental status   f/u with vascular surgery re possibility of CEA  c/w ASA and low dose Apixaban 2.5 mg BID, off Plavix since no plan for stent right now   c/w atorvastatin  SBP strictly 120-180, wean onesimo  metoprolol 12.5 mg BID  PPI ggt for melena and Carafate, GI following  NPO for GI bleed  transfuse for Hg>7    Patient is critically ill due to symptomatic carotid stenosis and Upper GI hemorrhage and at high risk for neurological deterioration or death due to: symptomatic carotid stenosis at high risk for strokes and Upper GI hemorrhage requiring pRBC transfusions.

## 2022-06-01 NOTE — PROGRESS NOTE ADULT - SUBJECTIVE AND OBJECTIVE BOX
DATE OF SERVICE: 06-01-22 @ 13:13  CHIEF COMPLAINT:Patient is a 67y old  Male who presents with a chief complaint of Decreased urine output for the past week, leg oedema (01 Jun 2022 11:19)    	        PAST MEDICAL & SURGICAL HISTORY:  HTN (hypertension), benign      HLD (hyperlipidemia)      DM type 2 (diabetes mellitus, type 2)      TIA (transient ischemic attack)      Atrial fibrillation      MI (myocardial infarction)  circa 2014 and 2022.      CAD (coronary artery disease)      Neuropathy      Stage 3 chronic kidney disease      2019 novel coronavirus disease (COVID-19)  12/2021.  Received the COVID-19 vaccine x 2 as of April 2022.      Status post angioplasty with stent  LULU x 3 2/7/2014      S/P carotid endarterectomy  left          events noted  in nsicu  awake  verbalizes   weakness r sided   no cp or sob    Medications:  MEDICATIONS  (STANDING):  allopurinol 100 milliGRAM(s) Oral daily  apixaban 2.5 milliGRAM(s) Oral two times a day  aspirin  chewable 81 milliGRAM(s) Oral daily  atorvastatin 40 milliGRAM(s) Oral at bedtime  chlorhexidine 4% Liquid 1 Application(s) Topical <User Schedule>  insulin lispro (ADMELOG) corrective regimen sliding scale   SubCutaneous every 6 hours  lidocaine   4% Patch 1 Patch Transdermal daily  metoprolol tartrate 12.5 milliGRAM(s) Oral every 12 hours  midazolam Injectable 10 milliGRAM(s) IV Push once  Nephro-reena 1 Tablet(s) Oral daily  ofloxacin 0.3% Solution 1 Drop(s) Right EYE four times a day  pantoprazole  Injectable 40 milliGRAM(s) IV Push every 12 hours  phenylephrine    Infusion 0.5 MICROgram(s)/kG/Min (22.7 mL/Hr) IV Continuous <Continuous>  polyethylene glycol 3350 17 Gram(s) Oral two times a day  povidone iodine 10% Solution 1 Application(s) Topical daily  senna 2 Tablet(s) Oral at bedtime  sucralfate 1 Gram(s) Oral every 12 hours    MEDICATIONS  (PRN):  bisacodyl 5 milliGRAM(s) Oral every 12 hours PRN Constipation    	    PHYSICAL EXAM:  T(C): 36.7 (06-01-22 @ 07:00), Max: 37.1 (05-31-22 @ 15:00)  HR: 120 (06-01-22 @ 13:05) (73 - 129)  BP: 138/51 (06-01-22 @ 13:05) (119/52 - 148/47)  RR: 15 (06-01-22 @ 13:05) (15 - 33)  SpO2: 90% (06-01-22 @ 13:05) (86% - 100%)  Wt(kg): --  I&O's Summary    31 May 2022 07:01  -  01 Jun 2022 07:00  --------------------------------------------------------  IN: 2178.3 mL / OUT: 0 mL / NET: 2178.3 mL    01 Jun 2022 07:01  -  01 Jun 2022 13:13  --------------------------------------------------------  IN: 158.8 mL / OUT: 0 mL / NET: 158.8 mL          Cardiovascular: reg irr  Respiratory:dec bs     Gastrointestinal:  Soft, Non-tender, + BS	  pvd lower ext  Neurologic:weakness r sided    TELEMETRY: 	    ECG:  	  RADIOLOGY:  OTHER: 	  	  LABS:	 	    CARDIAC MARKERS:                                8.4    19.31 )-----------( 323      ( 01 Jun 2022 05:11 )             26.8     05-31    135  |  97  |  26<H>  ----------------------------<  121<H>  3.5   |  25  |  3.41<H>    Ca    8.4      31 May 2022 22:52  Phos  1.6     05-31  Mg     1.8     05-31    TPro  6.1  /  Alb  2.9<L>  /  TBili  0.4  /  DBili  x   /  AST  12  /  ALT  <5<L>  /  AlkPhos  121<H>  05-30    proBNP:   Lipid Profile:   HgA1c:   TSH:

## 2022-06-01 NOTE — PROGRESS NOTE ADULT - ATTENDING COMMENTS
Agree with/edited as appropriate above  s/p EGD, bleeding duodenal ulcer clipping x2, +esophagitis  1U pRBC transfusion tonight; recheck post transfusion CBC  neurosurgery/vascular surgery for CEA, likely not a candidate for MITESH due to bleeding risk on dual antiplatelets  of onesimo now, increasing metoprolol, of note still holding cardizem 240mg CD

## 2022-06-01 NOTE — CHART NOTE - NSCHARTNOTEFT_GEN_A_CORE
Nutrition Follow Up Note  Patient seen for: Nutrition Note/ICU Admit    Chart reviewed, events noted. Pt is a 66 yo M with PMH: CAD, NSTEMI s/p 3 stents in , HTN, T2DM x 26 years c/b peripheral neuropathy, ischemic cardiomyopathy, CVA, TIA, A.Fib, chronic RLE wound, L carotid stenosis s/p endarterectomy. Now with prolonged hospital course in setting of renal failure requiring dialysis c/b b/l hypoperfusion strokes from R ICA stenosis 70-80% and L ICA occlusion (prior CEA) planned for R ICA stent but course c/b acute uremia, GIB, and additional hypoperfusion episode. S/P RRT  for unresponsiveness. Transferred to NSCU. S/P endoscopy in setting of blood loss anemia/melena. Plan for possible stent vs CEA to follow.     Source: [] Patient       [x] EMR        [x] RN        [] Family at bedside       [x] Other: interdisciplinary medical team    -If unable to interview patient: [x] Disoriented/confused/inappropriate to interview    Diet Order:   Diet, NPO:   Except Medications (22)    Nutrition-related concerns:  -Pt previously prescribed a Consistent Carbohydrate (no snacks) + Renal diet + LPS 2x daily + Nepro Shakes 1x daily. Made NPO on  in setting of RRT (unresponsiveness, with increased lethargy and AMS); additionally with positive fecal occult with large loose BM/dark and tarry stool.   -Per previous RD note , pt with hx of very poor appetite/PO intake in-house. Diagnosed with severe protein-calorie malnutrition.   -S/P electrolyte repletion: potassium phosphate/sodium phosphate (), potassium chloride (), magnesium sulfate ().   -Nephrology following. Last HD , net 1L. Plan for HD today after angio (see MD note  for additional details).   -Last BM: (): x 2; (): x 3. On bowel regimen (senna, Miralax, Dulcolax).   -On Nephro-Reena daily supplement.   -Prescribed Insulin Lispro Sliding Scale to aid in management of BG. A1c 6.6% ().     Weights:   Daily Weight in k.9 (), Weight in k.4 (), Weight in k.4 (), Weight in k.8 (), Weight in k.4 (), Weight in k.4 (), Weight in k.4 (). Wt fluctuations expected in setting of edema, on HD.     MEDICATIONS  (STANDING):  allopurinol  atorvastatin  insulin lispro (ADMELOG) corrective regimen sliding scale  metoprolol tartrate  Nephro-reena  pantoprazole  Injectable  phenylephrine    Infusion  polyethylene glycol 3350  senna  sucralfate    Pertinent Labs:  @ 22:52: Na 135, BUN 26<H>, Cr 3.41<H>, <H>, K+ 3.5, Phos 1.6<L>, Mg 1.8, Alk Phos --, ALT/SGPT --, AST/SGOT --, HbA1c --    A1C with Estimated Average Glucose Result: 6.6 % (22 @ 09:40)  A1C with Estimated Average Glucose Result: 10.1 % (22 @ 12:18)    Finger Sticks:  POCT Blood Glucose.: 138 mg/dL ( @ 12:21)  POCT Blood Glucose.: 122 mg/dL ( @ 05:04)  POCT Blood Glucose.: 125 mg/dL ( @ 23:10)  POCT Blood Glucose.: 153 mg/dL ( @ 17:02)    Skin per nursing documentation: surgical incision L forearm/AV fistula creation, skin tear right calf, left foot diabetic ulcer   Edema: 2+ generalized    Estimated Needs:   [x] no change since previous assessment  30-35 kcal/kg = 1640-8421 kcal/day  1.2-1.4 g/kg =  g pro/day  fluids deferred to team  based on IBW: 78.2 kg    Previous Nutrition Diagnosis: Inadequate oral intake, Increased nutrient needs; Severe acute malnutrition   Nutrition Diagnosis is: [x] ongoing  [] resolved [] not applicable     New Nutrition Diagnosis: [x] Not applicable    Nutrition Care Plan:  [x] In Progress  [] Achieved  [] Not applicable       Recommendations:      1. Defer advancement of PO diet to medical team. As able, consider clear liquid diet --> advance as tolerated to goal regular. Consider addition of consistent carbohydrate if persistently elevated FSBG noted. Otherwise, would remain free from therapeutic restrictions in setting of poor appetite/severe malnutrition. Defer consistency to medical team, SLP.   2. Consider alternative means of nutrition if within pt goals of care. If warranted --> consider Nepro with Carb Steady at 10ml/hr. Advanced as tolerated to GOAL rate 55ml/hr x 24 hrs. Based on IBW 78.2kg, provides: 1320ml, 2376kcal (30.4kcal/kg) and 107g protein (1.37g protein/kg).   3. Continue Nephro-Reena (renal multivitamin) as ordered if no medication contraindications noted.  4. Monitor wt trends/labs/skin integrity/hydration status/bowel regularity.    Monitoring and Evaluation:   Continue to monitor nutritional intake, tolerance to diet prescription, weights, labs, skin integrity    RD remains available upon request and will follow up per protocol

## 2022-06-01 NOTE — PROGRESS NOTE ADULT - SUBJECTIVE AND OBJECTIVE BOX
NEUROCRITICAL CARE EVENING NOTE    BRIEF SUMMARY:  67yMale presented with Patient is a 67y old  Male who presents with a chief complaint of Decreased urine output for the past week, leg oedema (01 Jun 2022 14:04)    VITALS/IMAGING/DATA/IVF FLUIDS/MEDICATIONS: [x] Reviewed    ALLERGIES: Allergies  nitrofurantoin (Nephrotoxicity)  Intolerances        EXAMINATION:  General:  calm  HEENT:  MMM  Neuro:  Awake, oriented x 1, following commands, right sided weakness UE 3/5, LE 2/5  Cards:  RRR  Respiratory:  no respiratory distress  Adomen:  soft  Extremities:  no edema  Skin:  warm/dry   NEUROCRITICAL CARE EVENING NOTE    BRIEF SUMMARY:      VITALS/IMAGING/DATA/IVF FLUIDS/MEDICATIONS: [x] Reviewed    ALLERGIES: Allergies  nitrofurantoin (Nephrotoxicity)  Intolerances        EXAMINATION:  General:  calm  HEENT:  MMM  Neuro:  Awake, not following commands right sided weakness UE 3/5, LE 2/5, left UE & LE spotaneous  Cards:  RRR  Respiratory:  no respiratory distress  Abdomen:  soft  Extremities:  no edema, left arm w/ AVF w/ palpable thrill, tight knee tophi   Skin:  warm/dry   NEUROCRITICAL CARE EVENING NOTE    BRIEF SUMMARY:   5/30-31 transferred to NSCU, MRI showing watershed infarcts, FOBT+, h/h drop, 2U pRBC transfusion, HD tonight     s/p EGD, found to have bleeding duodenal ulcers, clipped 2 out of 4, +esophagitis  further downtrending H/H, 1U pRBC this evening     REVIEW OF SYSTEMS: [x] Unable to Assess due to neurologic exam   [ ] All ROS addressed below are non-contributory, except:  Neuro: [ ] Headache [ ] Back pain [ ] Numbness [ ] Weakness [ ] Ataxia [ ] Dizziness [ ] Aphasia [ ] Dysarthria [ ] Visual disturbance  Resp: [ ] Shortness of breath/dyspnea [ ] Orthopnea [ ] Cough  CV: [ ] Chest pain [ ] Palpitation [ ] Lightheadedness [ ] Syncope  Renal: [ ] Thirst [ ] Edema  GI: [ ] Nausea [ ] Emesis [ ] Abdominal pain [ ] Constipation [ ] Diarrhea  Hem: [ ] Hematemesis [ ] bBright red blood per rectum  ID: [ ] Fever [ ] Chills [ ] Dysuria  ENT: [ ] Rhinorrhea    VITALS/IMAGING/DATA/IVF FLUIDS/MEDICATIONS: [x] Reviewed    ALLERGIES: Allergies  nitrofurantoin (Nephrotoxicity)  Intolerances    EXAMINATION:  General:  calm   HEENT:  MMM  Neuro:  Awake, oriented to self given choices, intermittently has simple verbal output, not following commands right sided weakness UE 3/5, LE 2/5, left UE & LE spotaneous  Cards:  RRR  Respiratory:  no respiratory distress  Abdomen:  soft  Extremities:  no edema, left arm w/ AVF w/ palpable thrill, tight knee tophi   Skin:  warm/dry

## 2022-06-01 NOTE — PROGRESS NOTE ADULT - ASSESSMENT
Impression:  This is a 67 year old gentleman pmhx CAD s/p MI/PCI/CABG, hx TIA, afib on rivaroxaban, HTN, DM2, PVD, gout, L CEA 2014, and CKD who presented to [facility] 4/11/22 with ANT, tremors, confusion and lethargy.  CT head no acute changes.  S/p initiation of hemodialysis.  Mental status had improved dramatically.  Pt denies any headaches, no slurred speech, no hemiparesis.  He has chronic gout with bilateral finger swelling, pain.  He also has chronic neuropathy.  Both legs are weak.      Persistent encephalopathy prompted re-consultation on 5/10/22.  MRI brain was ordered and revealed small acute infarctions in bilateral posterior centrum semiovale and left corona radiata and left posterior periventricular white matter.  Carotid ultrasound was performed showing new occlusion of the left internal carotid artery, along with greater than 70% stenosis involving the distal right common carotid artery, and mild to moderate flow-limiting stenosis is seen at the left external carotid artery.  Vascular surgery has been consulted and is recommending CTA.  He continues to be encephalopathic and lethargic.      5/30 stroke code activated for altered mental status.  remains altered but improved.  MRI brain shows new small, scattered infarcts, more in left hemisphere.  Unclear if cause of encephalopathy     Diagnosis and Recommendations:  1.  Multifactorial delirium - has been waxing and waning through his hospital course, now improving but not yet the best seen by this service  ICU has planned for video EEG monitoring    2. Complete left ICA occlusion and 70% distal CCA stenosis. Has collateral flow via acomm and pcomm but should be re-evaluated for either CEA or carotid stenting. CEA may be ideal considering issues with medication compliance and potential difficulty adhering to DAPT regimen but defer to proceduralists regarding this. On 5/25, Dr David discussed extensively with son at bedside and daughter via FaceTime: all options here have risk associated with them.  Opening the artery may or may not help.  However, it will reduce long-term stroke risk.  It might help with his mental status by helping brain perfusion, though this cannot be guaranteed.  There is risk associated with procedure.    Further questions regarding the procedure will need to be addressed by neurosurgery.          - MRA was suboptimal not visualizing the neck but MRA head showed left KESHAWN coming from ACOM and left PCA feeding left MCA.  ICA showed no flow.    - in setting of atrial fibrillation infarcts could potentially also be cardioembolic, continue anticoagulation as per cardiology  - s/p carotid doppler, suboptimal CTA/MRA.  However, it is apparent that there is a proximal left ICA occlusion, which would not be a candidate for intervention.  Intervention for right CCA/ICA stenosis would be high risk but it may indeed by a symptomatic stenosis.   - ECHO in February 2022 did not reveal severe cardiomyopathy, vegetation, or LV thrombus.    - HgA1c of 11 also driving stroke risk.  Goal is < 7  - continue high-intensity statin therapy    The issue is the high grade right stenosis and high risk of recurrent stroke, appreciate notes from neurointervention on risks of cea vs stent, states have discussed with vascular await their notes.      reviewed case with NSCU team and rody, that pt is critical and unstable, going for colonscopy today to assess GIB in anticipation of procedures, none without risk.  concur with video eeg though have not seen clear seizure activity while following him in house, reviewed recent imaging  total time spent 60 minutes

## 2022-06-01 NOTE — PROGRESS NOTE ADULT - ASSESSMENT
ASSESSMENT: 67M hx CAD s/p stent, afib, HTN, T2DM, CKD now requiring dialysis, L CEA now found to have R ICA stenosis(>70%) started on triple therapy c/b GIB requiring clipping of bleeding ulcer      PLAN:  neurochecks q1, c/w Eliquis & aspirin, plavix on hold. No plans for nsgy intervention as of this date   c/w HD per nephrology   afib - rate controlled on metoprolol 12.5mg BID, uptirtate as tolerated  NG tube in place, verified via CXR   Feeding: [] diet [] tube feeds  Analgesia/Sedation:   Seizure prophylaxis: [] not indicated  Thromboprophylaxis: [] SCDs [] chemoprophylaxis [] hold chemoprophylaxis due to: [] high risk of DVT/PE on admission due to:  Head of Bed/Activity: [] 30 degrees [] mobilize as tolerated [] PT [] OT [] PMNR  Ulcer prophylaxis: [] not indicated [] PPI [] other:  Glucose Control: Goal -180 [] RISS [] other:    [] Patient critically ill due to:    Time Seen:  Time Spent: __ [] critical care minutes    Contact: [phone] ASSESSMENT: 67M hx CAD s/p stent, afib, HTN, T2DM, CKD now requiring dialysis, L CEA now found to have R ICA stenosis(>70%) started on triple therapy c/b GIB requiring clipping of bleeding ulcer      PLAN:  neurochecks q1, c/w Eliquis & aspirin, plavix on hold. No plans for nsgy intervention as of this date   -c/w vEEG  c/w HD per nephrology   afib - rate controlled on metoprolol 12.5mg q6, uptirtate as tolerated  will provide additional 1U PRBC for acute blood loss anemia 2/2 GIB  NG tube in place, verified via CXR   Feeding: NPO s/p EGD clipping of ulcer   c/w ofloxacin for right eye conjunctivitis   Seizure prophylaxis: [x] not indicated  Thromboprophylaxis: [x] SCDs [] hold chemoprophylaxis due to: GIB  Head of Bed/Activity: [x] 30 degrees [] mobilize as tolerated [] PT [] OT [] PMNR  Ulcer prophylaxis: [] not indicated [x] protonix ggt  Glucose Control: Goal -180  ASSESSMENT: 67M hx CAD s/p stent, afib, HTN, T2DM, CKD now requiring dialysis, L CEA now found to have R ICA stenosis(>70%) started on triple therapy c/b GIB requiring clipping of bleeding ulcer      PLAN:  neurochecks q1, c/w Eliquis & aspirin, plavix on hold. No plans for nsgy intervention as of this date   -c/w vEEG  c/w HD per nephrology   afib - rate controlled on metoprolol 12.5mg q6, uptirtate as tolerated  will provide additional 1U PRBC for acute blood loss anemia 2/2 GIB  NG tube in place, verified via CXR   Feeding: NPO s/p EGD clipping of ulcer   c/w ofloxacin for right eye conjunctivitis   Seizure prophylaxis: [x] not indicated  Thromboprophylaxis: [x] SCDs [] hold chemoprophylaxis due to: GIB  Head of Bed/Activity: [x] 30 degrees [] mobilize as tolerated [] PT [] OT [] PMNR  Ulcer prophylaxis: [] not indicated [x] protonix ggt  Glucose Control: Goal -180     at risk for deterioration due to UGIB, acute blood loss anemia, acute stroke, cerebral hypoperfusion  ICU  full code

## 2022-06-01 NOTE — PRE PROCEDURE NOTE - PRE PROCEDURE EVALUATION
Pre-Endoscopy Evaluation      Referring Physician:           NSCU                         Procedure: EGD    Indication for Procedure: ac GI blood loss anemia - melena    Pertinent History:  67M w/ prolonged hospital course on medicine service iso renal failure req dialysis c/b b/l hypoperfusions strokes from R ICA stenosis 70-80% and L ICA occlusion (prior CEA) planned for BOOKER stent but course c/b acute uremia , GIB (melena) , and additional hypoperfusion episode and xfer to nscu. Planned for egd today and possible stent vs CEA to follow.     episodes of melena yesterday and overnight  off PLavix since ; remains on ASA and Apixaban (PAF) in setting of new strokes and L ICA occlusion  on BID PPI (IV)  s/p 2 units PRBCs yesterday for hgb 6.4, additional episode melena overnight, transfused additional 1 unit PRBCs   (total 3 u PRBC / 24 hours)  on pressor support  no hemetemesis or BRBPR  no abdominal pain  Pt with confusion; family at bedside (daughter) who gives consent for procedures    Sedation by Anesthesia [X ]    PAST MEDICAL & SURGICAL HISTORY:  HTN (hypertension), benign  HLD (hyperlipidemia)  DM type 2 (diabetes mellitus, type 2)  TIA (transient ischemic attack)  Atrial fibrillation  MI (myocardial infarction)  circa  and .  CAD (coronary artery disease)  Neuropathy  Stage 3 chronic kidney disease  2019 novel coronavirus disease (COVID-19)  2021.  Received the COVID-19 vaccine x 2 as of 2022.  Status post angioplasty with stent  LULU x 3 2014  S/P carotid endarterectomy  left          PMH of Gastroparesis [ ]  Gastric Surgery [ ]  Gastric Outlet Obstruction [ ] NONE    Allergies  nitrofurantoin (Nephrotoxicity)    Intolerances        Latex allergy: [ ] yes [X ] no    Medications:MEDICATIONS  (STANDING):  allopurinol 100 milliGRAM(s) Oral daily  apixaban 2.5 milliGRAM(s) Oral two times a day  aspirin  chewable 81 milliGRAM(s) Oral daily  atorvastatin 40 milliGRAM(s) Oral at bedtime  chlorhexidine 4% Liquid 1 Application(s) Topical <User Schedule>  insulin lispro (ADMELOG) corrective regimen sliding scale   SubCutaneous every 6 hours  lidocaine   4% Patch 1 Patch Transdermal daily  metoprolol tartrate 12.5 milliGRAM(s) Oral every 12 hours  Nephro-reena 1 Tablet(s) Oral daily  ofloxacin 0.3% Solution 1 Drop(s) Right EYE four times a day  pantoprazole  Injectable 40 milliGRAM(s) IV Push every 12 hours  phenylephrine    Infusion 0.5 MICROgram(s)/kG/Min (22.7 mL/Hr) IV Continuous <Continuous>  polyethylene glycol 3350 17 Gram(s) Oral two times a day  povidone iodine 10% Solution 1 Application(s) Topical daily  senna 2 Tablet(s) Oral at bedtime  sucralfate 1 Gram(s) Oral every 12 hours    MEDICATIONS  (PRN):  bisacodyl 5 milliGRAM(s) Oral every 12 hours PRN Constipation      Smoking: [ ] yes  [ X] no    AICD/PPM: [ ] yes   [X] no    Pertinent lab data:                        8.4     )-----------( 323      ( 2022 05:11 )             26.8         135  |  97  |  26<H>  ----------------------------<  121<H>  3.5   |  25  |  3.41<H>    Ca    8.4      31 May 2022 22:52  Phos  1.6       Mg     1.8         TPro  6.1  /  Alb  2.9<L>  /  TBili  0.4  /  DBili  x   /  AST  12  /  ALT  <5<L>  /  AlkPhos  121<H>  0530    PT/INR - ( 2022 05:11 )   PT: 25.7 sec;   INR: 2.20 ratio    PTT - ( 2022 05:11 )  PTT:32.5 sec      COVID-19 PCR . (22 @ 22:50)   COVID-19 PCR: NotDetec        Physical Examination:  Daily Height in cm: 180.3 (31 May 2022 23:44)    Daily Weight in k.4 (31 May 2022 22:10)  Vital Signs Last 24 Hrs  T(C): 36.7 (2022 07:00), Max: 37.1 (31 May 2022 15:00)  T(F): 98.1 (2022 07:00), Max: 98.7 (31 May 2022 15:00)  HR: 96 (2022 09:45) (66 - 115)  BP: --  BP(mean): --  RR: 22 (2022 09:45) (15 - 30)  SpO2: 100% (2022 09:45) (92% - 100%)    BP:                 HR:                  SPO2:               Temperature:    Constitutional: NAD awake, confused but responsive  +NGT (kaofeed)    HEENT: PERRLA, EOMI,  anicteric     Neck:  No JVD    Respiratory: CTAB/L    Cardiovascular: S1 and S2    Gastrointestinal: BS+, soft, NT/ND, no scars    Extremities: No peripheral edema +chronic stasis changes b/l  +wrist restraints    Neurological: alert, responsive. intermittently confused and agitated Rt facial droop    Psychiatric: intermittently confused and agitated    : No Prado    Skin: No rashes +chronic stasis changes b/l LE    Comments:    ASA Class: I [ ]  II [ ]  III [ ]  IV [ X]    The patient is a suitable candidate for the planned procedure unless box checked [ ]  No, explain:                                                                                                 Pre-Endoscopy Evaluation      Referring Physician:           NSCU                         Procedure: EGD    Indication for Procedure: ac GI blood loss anemia - melena    Pertinent History:  67M w/ prolonged hospital course on medicine service iso renal failure req dialysis c/b b/l hypoperfusions strokes from R ICA stenosis 70-80% and L ICA occlusion (prior CEA) planned for BOOKER stent but course c/b acute uremia , GIB (melena) , and additional hypoperfusion episode and xfer to nscu. Planned for egd today and possible stent vs CEA to follow.     episodes of melena yesterday and overnight  off PLavix since ; remains on ASA and Apixaban (PAF) in setting of new strokes and L ICA occlusion  on BID PPI (IV)  s/p 2 units PRBCs yesterday for hgb 6.4, additional episode melena overnight, transfused additional 1 unit PRBCs   (total 3 u PRBC / 24 hours)  on pressor support  no hemetemesis or BRBPR  no abdominal pain  Pt with confusion; family at bedside (daughter) who gives consent for procedures    Sedation by Anesthesia [X ]    PAST MEDICAL & SURGICAL HISTORY:  HTN (hypertension), benign  HLD (hyperlipidemia)  DM type 2 (diabetes mellitus, type 2)  TIA (transient ischemic attack)  Atrial fibrillation  MI (myocardial infarction)  circa  and .  CAD (coronary artery disease)  Neuropathy  Stage 3 chronic kidney disease  2019 novel coronavirus disease (COVID-19)  2021.  Received the COVID-19 vaccine x 2 as of 2022.  Status post angioplasty with stent  LULU x 3 2014  S/P carotid endarterectomy  left          PMH of Gastroparesis [ ]  Gastric Surgery [ ]  Gastric Outlet Obstruction [ ] NONE    Allergies  nitrofurantoin (Nephrotoxicity)    Intolerances        Latex allergy: [ ] yes [X ] no    Medications:MEDICATIONS  (STANDING):  allopurinol 100 milliGRAM(s) Oral daily  apixaban 2.5 milliGRAM(s) Oral two times a day  aspirin  chewable 81 milliGRAM(s) Oral daily  atorvastatin 40 milliGRAM(s) Oral at bedtime  chlorhexidine 4% Liquid 1 Application(s) Topical <User Schedule>  insulin lispro (ADMELOG) corrective regimen sliding scale   SubCutaneous every 6 hours  lidocaine   4% Patch 1 Patch Transdermal daily  metoprolol tartrate 12.5 milliGRAM(s) Oral every 12 hours  Nephro-reena 1 Tablet(s) Oral daily  ofloxacin 0.3% Solution 1 Drop(s) Right EYE four times a day  pantoprazole  Injectable 40 milliGRAM(s) IV Push every 12 hours  phenylephrine    Infusion 0.5 MICROgram(s)/kG/Min (22.7 mL/Hr) IV Continuous <Continuous>  polyethylene glycol 3350 17 Gram(s) Oral two times a day  povidone iodine 10% Solution 1 Application(s) Topical daily  senna 2 Tablet(s) Oral at bedtime  sucralfate 1 Gram(s) Oral every 12 hours    MEDICATIONS  (PRN):  bisacodyl 5 milliGRAM(s) Oral every 12 hours PRN Constipation      Smoking: [ ] yes  [ X] no    AICD/PPM: [ ] yes   [X] no    Pertinent lab data:                        8.4     )-----------( 323      ( 2022 05:11 )             26.8         135  |  97  |  26<H>  ----------------------------<  121<H>  3.5   |  25  |  3.41<H>    Ca    8.4      31 May 2022 22:52  Phos  1.6       Mg     1.8         TPro  6.1  /  Alb  2.9<L>  /  TBili  0.4  /  DBili  x   /  AST  12  /  ALT  <5<L>  /  AlkPhos  121<H>  0530    PT/INR - ( 2022 05:11 )   PT: 25.7 sec;   INR: 2.20 ratio    PTT - ( 2022 05:11 )  PTT:32.5 sec      COVID-19 PCR . (22 @ 22:50)   COVID-19 PCR: NotDetec        Physical Examination:  Daily Height in cm: 180.3 (31 May 2022 23:44)    Daily Weight in k.4 (31 May 2022 22:10)  Vital Signs Last 24 Hrs  T(C): 36.7 (2022 07:00), Max: 37.1 (31 May 2022 15:00)  T(F): 98.1 (2022 07:00), Max: 98.7 (31 May 2022 15:00)  HR: 96 (2022 09:45) (66 - 115)  BP: --  BP(mean): --  RR: 22 (2022 09:45) (15 - 30)  SpO2: 100% (2022 09:45) (92% - 100%)    BP:                 HR:                  SPO2:               Temperature:    Constitutional: NAD awake, confused but responsive  +NGT (kaofeed)    HEENT: PERRLA, EOMI,  anicteric     Neck:  No JVD    Respiratory: CTAB/L    Cardiovascular: S1 and S2    Gastrointestinal: BS+, soft, NT/ND, no scars    Extremities: No peripheral edema +chronic stasis changes b/l  +wrist restraints    Neurological: alert, responsive. intermittently confused and agitated Rt facial droop    Psychiatric: intermittently confused and agitated    : No Prado    Skin: No rashes +chronic stasis changes b/l LE    Comments:    ASA Class: I [ ]  II [ ]  III [ X]  IV [ X]    The patient is a suitable candidate for the planned procedure unless box checked [ ]  No, explain:

## 2022-06-01 NOTE — PROGRESS NOTE ADULT - SUBJECTIVE AND OBJECTIVE BOX
SUBJECTIVE: now in ICU, going for colonoscopy    Medications:  allopurinol 100 milliGRAM(s) Oral daily  apixaban 2.5 milliGRAM(s) Oral two times a day  aspirin  chewable 81 milliGRAM(s) Oral daily  atorvastatin 40 milliGRAM(s) Oral at bedtime  bisacodyl 5 milliGRAM(s) Oral every 12 hours PRN  chlorhexidine 4% Liquid 1 Application(s) Topical <User Schedule>  insulin lispro (ADMELOG) corrective regimen sliding scale   SubCutaneous every 6 hours  lidocaine   4% Patch 1 Patch Transdermal daily  metoprolol tartrate 12.5 milliGRAM(s) Oral every 12 hours  Nephro-reena 1 Tablet(s) Oral daily  ofloxacin 0.3% Solution 1 Drop(s) Right EYE four times a day  pantoprazole  Injectable 40 milliGRAM(s) IV Push every 12 hours  phenylephrine    Infusion 0.5 MICROgram(s)/kG/Min IV Continuous <Continuous>  polyethylene glycol 3350 17 Gram(s) Oral two times a day  povidone iodine 10% Solution 1 Application(s) Topical daily  senna 2 Tablet(s) Oral at bedtime  sucralfate 1 Gram(s) Oral every 12 hours      Labs:  CBC Full  -  ( 01 Jun 2022 05:11 )  WBC Count : 19.31 K/uL  RBC Count : 3.18 M/uL  Hemoglobin : 8.4 g/dL  Hematocrit : 26.8 %  Platelet Count - Automated : 323 K/uL  Mean Cell Volume : 84.3 fl  Mean Cell Hemoglobin : 26.4 pg  Mean Cell Hemoglobin Concentration : 31.3 gm/dL  Auto Neutrophil # : x  Auto Lymphocyte # : x  Auto Monocyte # : x  Auto Eosinophil # : x  Auto Basophil # : x  Auto Neutrophil % : x  Auto Lymphocyte % : x  Auto Monocyte % : x  Auto Eosinophil % : x  Auto Basophil % : x    05-31    135  |  97  |  26<H>  ----------------------------<  121<H>  3.5   |  25  |  3.41<H>    Ca    8.4      31 May 2022 22:52  Phos  1.6     05-31  Mg     1.8     05-31    TPro  6.1  /  Alb  2.9<L>  /  TBili  0.4  /  DBili  x   /  AST  12  /  ALT  <5<L>  /  AlkPhos  121<H>  05-30    CAPILLARY BLOOD GLUCOSE      POCT Blood Glucose.: 122 mg/dL (01 Jun 2022 05:04)  POCT Blood Glucose.: 125 mg/dL (31 May 2022 23:10)  POCT Blood Glucose.: 153 mg/dL (31 May 2022 17:02)  POCT Blood Glucose.: 180 mg/dL (31 May 2022 11:41)    LIVER FUNCTIONS - ( 30 May 2022 22:40 )  Alb: 2.9 g/dL / Pro: 6.1 g/dL / ALK PHOS: 121 U/L / ALT: <5 U/L / AST: 12 U/L / GGT: x           PT/INR - ( 01 Jun 2022 05:11 )   PT: 25.7 sec;   INR: 2.20 ratio         PTT - ( 01 Jun 2022 05:11 )  PTT:32.5 sec        Vitals:  Vital Signs Last 24 Hrs  T(C): 36.7 (01 Jun 2022 07:00), Max: 37.1 (31 May 2022 15:00)  T(F): 98.1 (01 Jun 2022 07:00), Max: 98.7 (31 May 2022 15:00)  HR: 97 (01 Jun 2022 09:00) (57 - 115)  BP: --  BP(mean): --  RR: 22 (01 Jun 2022 09:00) (15 - 30)  SpO2: 99% (01 Jun 2022 09:00) (92% - 100%)      NEUROLOGICAL EXAM:    Mental status: awake, alert, knew name, place not year, followed some commands between intermittent yelling    Cranial Nerves: Pupils were equal, round, reactive to light. Extraocular movements were intact. B BTT Facial sensation was intact to light touch. There was no facial asymmetry.      Motor exam: Bulk and tone were normal. difficult to assess as pt in restraints    Reflexes: DTRs depressed, flexor plantars.     Sensation: Intact to noxious, seems intact to light touch, due to decreased attention did not assess other modalities     Coordination: no gross dysmetria    Gait: deferred    NIHSS: 20    Imaging:    ACC: [id number] EXAM:  CT BRAIN                        PROCEDURE DATE:  04/09/2022      INTERPRETATION:  CLINICAL INFORMATION:  Altered mental status, on   anticoagulation .    TECHNIQUE: Multiple contiguous axial images were acquired from the   skullbase to the vertex without the administration of intravenous   contrast.  Coronal and sagittal reformations were made.    COMPARISON: CT head 10/21/2021, brain MRI 02/23/2014.    FINDINGS:    Scattered lacunar infarcts in the bilateral cerebralhemispheres are   grossly stable compared to the 10/21/2021 head CT. No new additional   infarcts are noted over the time interval.    No acute intracranial hemorrhage, mass effect, or midline shift. The   brain demonstrates periventricular hypoattenuation, nonspecific in   etiology but likely representing chronic microvascular ischemic changes.    No abnormal extra-axial fluid collections are present.    The ventricles and sulci demonstrate age appropriate involutional   changes.  The basal cisterns are patent.    The orbits are unremarkable. The paranasal sinuses are clear. The mastoid   air cells are clear. The calvarium is intact.    IMPRESSION:    No acute intracranial abnormality is noted. If the patient has new and   persistent symptoms, consider short interval follow-up head CT or brain   MRI.    --- End of Report ---    GATITO VILLARREAL MD; Resident Radiologist  This document has been electronically signed.  JESSE CORONA MD; Attending Radiologist  This document has been electronically signed. Apr 9 2022  2:00PM        ACC: [id number] EXAM:  MR BRAIN                          PROCEDURE DATE:  05/11/2022          INTERPRETATION:  MR brain  without gadolinium    CLINICAL INFORMATION: Stroke.    TECHNIQUE: Limited Sagittal T1-weighted images, axial FLAIR images, and   axial diffusion weighted images of the brain were obtained.  Patient was   unable to hold still for remaining sequences.    FINDINGS:   MRI dated 02/23/2014 available for review.    The brain demonstrates small acute infarctions in the BILATERAL posterior   centrum semiovale and LEFT corona radiata and LEFT posterior   periventricular white matter with restricted diffusion. No intracranial   hemorrhage is recognized. No mass effect is found in the brain.    The ventricles, sulci and basal cisterns show mild global atrophy most   prominent within the BILATERAL cerebellum.    The vertebral and internal carotid arteries demonstrate expected flow   voids indicating their patency.    The orbits are unremarkable.  The paranasal sinuses are clear.  The nasal   cavity appears intact.  The nasopharynx is symmetric.  The central skull   base and temporal bones are intact.  The calvarium appears unremarkable.    IMPRESSION: Small acute infarctions in the BILATERAL posterior centrum   semiovale and LEFT corona radiata and LEFT posterior periventricular   white matter with restricted diffusion.   mild global atrophy most   prominent within the BILATERAL cerebellum.    --- End of Report ---            JESÚS PADGETT MD; Attending Radiologist  This document has been electronically signed. May 11 2022  5:47PM          < from: CT Angio Head w/ IV Cont (05.12.22 @ 13:34) >    ACC: [id number] EXAM:  CT ANGIO NECK (W)AW IC                        ACC: [id number] EXAM:  CT ANGIO BRAIN (W)AW IC                          PROCEDURE DATE:  05/12/2022          INTERPRETATION:  CT angiography of the brain and neck    CLINICAL INDICATION: Recent infarcts. Carotid stenosis.    TECHNIQUE: Direct axial CT scanning of the brain and neck was obtained   from the vertex to the level of the clavicular heads after the dynamic   intravenous injection of 44 cc of Omnipaque 300. 0 cc were discarded.   Sagittal and coronal maximum intensity projection reformats were   provided.  Three-dimensional reconstructions were performed by the   radiologist using the [employer] workstation.    Additionally, noncontrast axial CT scanning of the brain was obtained   from the skull base to the vertex. Sagittal and coronal reformats were   provided.    COMPARISON: MRA neck 10/24/2021    FINDINGS:    CT brain:    Previously seen acute infarcts in the bilateral cerebral hemispheres are   redemonstrated. No CT evidence for hemorrhagic transformation.    No hydrocephalus, midline shift, or effacement of basal cisterns.    No acute intracranial hemorrhage or vasogenic edema.    Mild to moderate white matter microvascular ischemic disease.    CTA brain:    Limited by relatively decreased opacification of the arteries.    Multifocal narrowing of the right skull base ICA due to calcified plaque,   the degree of which is not well evaluated.    Normal flow-related enhancement of the supraclinoid right ICA.    Lack of flow related enhancement of the petrous, precavernous, and   cavernous left ICA. Reconstitution of the vessel at its supraclinoid   segment.    Otherwise no flow-limiting stenosis.    Normal flow-related enhancement of the bilateral anterior, middle, and   posterior cerebral arteries.    Normal flow-related enhancement of the intradural vertebral arteries and   basilar artery.    No vascular aneurysm or AVM.    CTA neck:    Stenoses described in this report are on the basis of NASCET criteria.    Study is significantly limited by relatively decreased opacification of   the arteries.    Short segment stenosis of the mid left common carotid artery due to the   presence of partially calcified plaque is poorly evaluated. Flow-related   enhancement of the more proximal and distal left common carotid artery is   noted.    Long segment stenosis of the mid and distal right common carotid artery   due to the presence of partially calcified plaque is poorly evaluated.    Rapidly progressive stenosis of the left internal carotid artery   beginning at its origin. No flow related enhancement of the vessel beyond   its origin.    Normal flow-related enhancement of the bilateral vertebral arteries.    No gross evidence for acute arterial dissection.    IMPRESSION:    CTA brain:  Limited by relatively decreased opacification of the arteries.    Multifocal narrowing of the right skull base ICA due to calcified plaque,   the degree of which is not well evaluated.    Lack of flow related enhancement of the petrous, precavernous, and   cavernous left ICA. Reconstitution of the vessel at its supraclinoid   segment.    Otherwise no flow-limiting stenosis.    No vascular aneurysm or AVM.    CTA neck:  Limited by relatively decreased opacification of the arteries.    Short segment stenosis of the mid left common carotid artery due to the   presence of partially calcified plaque is poorly evaluated. Flow-related   enhancement of the more proximal and distal left common carotid artery is   noted.    Long segment stenosis of the mid and distal right common carotid artery   due to the presence of partially calcified plaque is poorly evaluated.    Rapidly progressive stenosis of the left internal carotid artery   beginning at its origin. No flow related enhancement of the vessel beyond   its origin.    Recommend correlation with contrast-enhanced MRI of the neck for further   evaluation.    --- End of Report ---            MIGUEL ANGEL CARRILLO MD; Attending Radiologist  This document has been electronically signed. May 12 2022  2:47PM    < end of copied text >      < from: MR Head No Cont (05.31.22 @ 08:42) >    ACC: [id number] EXAM:  MR BRAIN                          PROCEDURE DATE:  05/31/2022          INTERPRETATION:  Clinical history: Code stroke.    Limited MRI was performed using axial diffusion and susceptibility   weighted sequence as well as axial T2-weighted sequence. This exam is   compared with prior brain MRI performed on May 11, 2022.    Scattered areas of abnormal T2 prolongation with restricted diffusion is   seen involving the bilateral corona radiata and centrum semiovale region   as well as the left periatrial region. These findings compatible with   areas of acute infarcts. These findings could be secondary to systemic   etiology. Please correlate clinically.    Parenchymal volume loss and chronic microvascular ischemic changes are   identified    Multiple inflammatory changes seen involving the right mastoid.    The visualized paranasal sinuses mastoid and middle ear regions appear   clear.    IMPRESSION: Acute infarcts as described above.    --- End of Report ---            TOMASA BUTLER MD; Attending Radiologist  This document has been electronically signed. May 31 2022  9:06AM    < end of copied text >

## 2022-06-01 NOTE — PROGRESS NOTE ADULT - PROBLEM SELECTOR PLAN 5
-Carotid duplex with greater than 70% stenosis involving the distal R common carotid artery  -MR head with multiple small acute infarct   -Plans for angiogram with possible stent placement. No pulmonary contraindications to planned procedure.

## 2022-06-01 NOTE — PROGRESS NOTE ADULT - ASSESSMENT
68 y/o M with PMH of pleural effusion (2019 s/p R thoracentesis, then R CT placement with course c/b R hydroPTX - tx to [facility] for possible VATs decortication which was deferred), CAD, NSTEMI s/p stents (2014 stents to LAD, proximal and distal LCx, and additional LULU to proximal LAD 2/2022), ischemic cardiomyopathy, HTN, T2DM c/b peripheral neuropathy, CVA, TIA, Afib, chronic RLE wound, L carotid stenosis s/p endarterectomy (2014). Presented to ED with hematuria, B/L LE edema, weight gain, and orthopnea - found to be in volume overload, renal failure. Course c/b worsening mental status, GIB.

## 2022-06-01 NOTE — PROGRESS NOTE ADULT - SUBJECTIVE AND OBJECTIVE BOX
HPI:  Patient remotely known to me from an admission to [facility] in Feb 2017--assigned to me at this point by the ER to admit this 68 y/o M--followed by his nephrologist above--patient with a history of CAD with past multiple PCI, with most recent discharge from [facility] in Feb 2022 for non ST elevation MI with LULU of an 85% prox LAD lesion with patient on ASA and now off Plavix per cardiology (only for one month), chronic atrial fibrillation maintained on Pradaxa, essential HTN, type 2 DM previously on oral medications but on insulin, diabetic neuropathy with chronic RIGHT LE venous stasis changes>left, LEFT foot ulcer seen by podiatry, past endarterectomy LEFT CEA in 2014. with patient self referring to the ER following apparently treatment by an urgent care center for an apparent cystitis with hematuria on nitrofurantoin but with patient noting decreased urine output for the past week, and difficulty with B/L coarse tremors for several weeks, with patient having difficulty negotiating his applications on his smart phone (observed by examiner).  Patient with increasing B/L LE oedema and weight gain, with patient with 2 pillow orthopnoea.      Scores on admission   GCS [id number]  NIHSS 18    OVERNIGHT EVENTS:   Hb drop due to UGB  Received 3 units of PRBCs since yesterday      VITALS:  T(C): , Max: 37.1 (05-31-22 @ 15:00)  HR:  (57 - 115)  BP:  (80/51 - 80/51)  ABP:  (66/22 - 188/56)  RR:  (15 - 30)  SpO2:  (92% - 100%)  Wt(kg): --      05-31-22 @ 07:01  -  06-01-22 @ 06:38  --------------------------------------------------------  IN: 2155.6 mL / OUT: 0 mL / NET: 2155.6 mL      LABS:  Na: 135 (05-31 @ 22:52), 135 (05-31 @ 02:28), 133 (05-30 @ 22:40), 134 (05-29 @ 16:28)  K: 3.5 (05-31 @ 22:52), 3.9 (05-31 @ 02:28), 4.2 (05-30 @ 22:40), 3.7 (05-29 @ 16:28)  Cl: 97 (05-31 @ 22:52), 94 (05-31 @ 02:28), 95 (05-30 @ 22:40), 94 (05-29 @ 16:28)  CO2: 25 (05-31 @ 22:52), 22 (05-31 @ 02:28), 23 (05-30 @ 22:40), 28 (05-29 @ 16:28)  BUN: 26 (05-31 @ 22:52), 68 (05-31 @ 02:28), 64 (05-30 @ 22:40), 20 (05-29 @ 16:28)  Cr: 3.41 (05-31 @ 22:52), 6.99 (05-31 @ 02:28), 6.75 (05-30 @ 22:40), 5.05 (05-29 @ 16:28)  Glu: 121(05-31 @ 22:52), 232(05-31 @ 02:28), 208(05-30 @ 22:40), 150(05-29 @ 16:28)    Hgb: 8.4 (06-01 @ 05:11), 7.8 (05-31 @ 22:50), 7.1 (05-31 @ 15:48), 6.4 (05-31 @ 09:40), 7.1 (05-31 @ 02:28), 7.1 (05-30 @ 22:40), 8.7 (05-29 @ 16:28)  Hct: 26.8 (06-01 @ 05:11), 26.0 (05-31 @ 22:50), 23.5 (05-31 @ 15:48), 22.3 (05-31 @ 09:40), 24.0 (05-31 @ 02:28), 24.1 (05-30 @ 22:40), 29.9 (05-29 @ 16:28)  WBC: 19.31 (06-01 @ 05:11), 20.91 (05-31 @ 22:50), 19.72 (05-31 @ 15:48), 19.15 (05-31 @ 09:40), 17.08 (05-31 @ 02:28), 17.38 (05-30 @ 22:40), 12.42 (05-29 @ 16:28)  Plt: 323 (06-01 @ 05:11), 327 (05-31 @ 22:50), 396 (05-31 @ 15:48), 407 (05-31 @ 09:40), 440 (05-31 @ 02:28), 412 (05-30 @ 22:40), 337 (05-29 @ 16:28)    INR: 2.20 06-01-22 @ 05:11, 2.46 05-31-22 @ 02:35  PTT: 32.5 06-01-22 @ 05:11, 33.2 05-31-22 @ 02:35, 34.1 05-30-22 @ 22:40    IMAGING:   Recent imaging studies were reviewed.    MEDICATIONS:  allopurinol 100 milliGRAM(s) Oral daily  apixaban 2.5 milliGRAM(s) Oral two times a day  aspirin  chewable 81 milliGRAM(s) Oral daily  atorvastatin 40 milliGRAM(s) Oral at bedtime  bisacodyl 5 milliGRAM(s) Oral every 12 hours PRN  chlorhexidine 4% Liquid 1 Application(s) Topical <User Schedule>  insulin lispro (ADMELOG) corrective regimen sliding scale   SubCutaneous every 6 hours  lidocaine   4% Patch 1 Patch Transdermal daily  metoprolol tartrate 12.5 milliGRAM(s) Oral every 12 hours  Nephro-reena 1 Tablet(s) Oral daily  ofloxacin 0.3% Solution 1 Drop(s) Right EYE four times a day  pantoprazole  Injectable 40 milliGRAM(s) IV Push every 12 hours  phenylephrine    Infusion 0.5 MICROgram(s)/kG/Min IV Continuous <Continuous>  polyethylene glycol 3350 17 Gram(s) Oral two times a day  povidone iodine 10% Solution 1 Application(s) Topical daily  senna 2 Tablet(s) Oral at bedtime  sucralfate 1 Gram(s) Oral every 12 hours    EXAMINATION:  General:  calm  HEENT:  MMM  Neuro:  [place], oriented x 0, not following commands, right sided weakness 2/5  Cards:  RRR  Respiratory:  no respiratory distress  Adomen:  soft  Extremities:  no edema  Skin:  warm/dry   HPI:  Patient remotely known to me from an admission to [place] in Feb 2017--assigned to me at this point by the ER to admit this 66 y/o M--followed by his nephrologist above--patient with a history of CAD with past multiple PCI, with most recent discharge from [place] in Feb 2022 for non ST elevation MI with LULU of an 85% prox LAD lesion with patient on ASA and now off Plavix per cardiology (only for one month), chronic atrial fibrillation maintained on Pradaxa, essential HTN, type 2 DM previously on oral medications but on insulin, diabetic neuropathy with chronic RIGHT LE venous stasis changes>left, LEFT foot ulcer seen by podiatry, past endarterectomy LEFT CEA in 2014. with patient self referring to the ER following apparently treatment by an urgent care center for an apparent cystitis with hematuria on nitrofurantoin but with patient noting decreased urine output for the past week, and difficulty with B/L coarse tremors for several weeks, with patient having difficulty negotiating his applications on his smart phone (observed by examiner).  Patient with increasing B/L LE oedema and weight gain, with patient with 2 pillow orthopnoea.      Scores on admission   GCS [id number]  NIHSS 18    OVERNIGHT EVENTS:   Hb drop due to UGB  Received 3 units of PRBCs since yesterday      VITALS:  T(C): , Max: 37.1 (05-31-22 @ 15:00)  HR:  (57 - 115)  BP:  (80/51 - 80/51)  ABP:  (66/22 - 188/56)  RR:  (15 - 30)  SpO2:  (92% - 100%)  Wt(kg): --      05-31-22 @ 07:01  -  06-01-22 @ 06:38  --------------------------------------------------------  IN: 2155.6 mL / OUT: 0 mL / NET: 2155.6 mL      LABS:  Na: 135 (05-31 @ 22:52), 135 (05-31 @ 02:28), 133 (05-30 @ 22:40), 134 (05-29 @ 16:28)  K: 3.5 (05-31 @ 22:52), 3.9 (05-31 @ 02:28), 4.2 (05-30 @ 22:40), 3.7 (05-29 @ 16:28)  Cl: 97 (05-31 @ 22:52), 94 (05-31 @ 02:28), 95 (05-30 @ 22:40), 94 (05-29 @ 16:28)  CO2: 25 (05-31 @ 22:52), 22 (05-31 @ 02:28), 23 (05-30 @ 22:40), 28 (05-29 @ 16:28)  BUN: 26 (05-31 @ 22:52), 68 (05-31 @ 02:28), 64 (05-30 @ 22:40), 20 (05-29 @ 16:28)  Cr: 3.41 (05-31 @ 22:52), 6.99 (05-31 @ 02:28), 6.75 (05-30 @ 22:40), 5.05 (05-29 @ 16:28)  Glu: 121(05-31 @ 22:52), 232(05-31 @ 02:28), 208(05-30 @ 22:40), 150(05-29 @ 16:28)    Hgb: 8.4 (06-01 @ 05:11), 7.8 (05-31 @ 22:50), 7.1 (05-31 @ 15:48), 6.4 (05-31 @ 09:40), 7.1 (05-31 @ 02:28), 7.1 (05-30 @ 22:40), 8.7 (05-29 @ 16:28)  Hct: 26.8 (06-01 @ 05:11), 26.0 (05-31 @ 22:50), 23.5 (05-31 @ 15:48), 22.3 (05-31 @ 09:40), 24.0 (05-31 @ 02:28), 24.1 (05-30 @ 22:40), 29.9 (05-29 @ 16:28)  WBC: 19.31 (06-01 @ 05:11), 20.91 (05-31 @ 22:50), 19.72 (05-31 @ 15:48), 19.15 (05-31 @ 09:40), 17.08 (05-31 @ 02:28), 17.38 (05-30 @ 22:40), 12.42 (05-29 @ 16:28)  Plt: 323 (06-01 @ 05:11), 327 (05-31 @ 22:50), 396 (05-31 @ 15:48), 407 (05-31 @ 09:40), 440 (05-31 @ 02:28), 412 (05-30 @ 22:40), 337 (05-29 @ 16:28)    INR: 2.20 06-01-22 @ 05:11, 2.46 05-31-22 @ 02:35  PTT: 32.5 06-01-22 @ 05:11, 33.2 05-31-22 @ 02:35, 34.1 05-30-22 @ 22:40    IMAGING:   Recent imaging studies were reviewed.    MEDICATIONS:  allopurinol 100 milliGRAM(s) Oral daily  apixaban 2.5 milliGRAM(s) Oral two times a day  aspirin  chewable 81 milliGRAM(s) Oral daily  atorvastatin 40 milliGRAM(s) Oral at bedtime  bisacodyl 5 milliGRAM(s) Oral every 12 hours PRN  chlorhexidine 4% Liquid 1 Application(s) Topical <User Schedule>  insulin lispro (ADMELOG) corrective regimen sliding scale   SubCutaneous every 6 hours  lidocaine   4% Patch 1 Patch Transdermal daily  metoprolol tartrate 12.5 milliGRAM(s) Oral every 12 hours  Nephro-reena 1 Tablet(s) Oral daily  ofloxacin 0.3% Solution 1 Drop(s) Right EYE four times a day  pantoprazole  Injectable 40 milliGRAM(s) IV Push every 12 hours  phenylephrine    Infusion 0.5 MICROgram(s)/kG/Min IV Continuous <Continuous>  polyethylene glycol 3350 17 Gram(s) Oral two times a day  povidone iodine 10% Solution 1 Application(s) Topical daily  senna 2 Tablet(s) Oral at bedtime  sucralfate 1 Gram(s) Oral every 12 hours    EXAMINATION:  General:  calm  HEENT:  MMM  Neuro:  Awake, oriented x 1, following commands, right sided weakness UE 3/5, LE 2/5  Cards:  RRR  Respiratory:  no respiratory distress  Adomen:  soft  Extremities:  no edema  Skin:  warm/dry

## 2022-06-01 NOTE — PROGRESS NOTE ADULT - ASSESSMENT
Echo 5/13/22:  1. Normal left ventricular internal dimensions and wall  thicknesses.  2. Endocardial visualization enhanced with intravenous  injection of Ultrasonic Enhancing Agent (Lumason). Grossly  borderline normal left ventricular systolic function; no  obvious segmental wall motion abnormality.  3. The right ventricle is not well visualized.  *** Compared with echocardiogram of 2/22/2022, no  significant changes noted.      A/P    66 y/o M with history of CAD, s/p multiple PCI, with most recent 2/22 PCI of LAD in setting of NSTEMI, on ASA only (pradaxa), chronic atrial fibrillation maintained on Pradaxa, essential HTN, type 2 DM previously on oral medications but on insulin, diabetic neuropathy with chronic RIGHT LE venous stasis changes>left, LEFT foot ulcer seen by podiatry, past endarterectomy LEFT CEA in 2014 presenting with 1 week of decreased urine output, cystitis with hematuria     #ANT on CKD, new HD  -oliguric renal failure in setting of UTI/cystitis  -New HD for uremia, renal failure  -s/p L AVG 4/18, s/p permacath placement 4/20  -renal f/ u    #AMS/Lethargy  -ams likely from pain meds, embolic cva  -repeat CT 4/27 unremarkable  -MRI 5/11 revealed small acute infarctions in bilateral posterior centrum semiovale and left corona radiata and left posterior periventricular white matter  -repeat echo with no interval changes  -sp RRT 5/3- repeat imaging noted - transferred to NSICU  -neuro following   -anemia - sp PRBC- pending EGD    #Acute on chronic HFpEF  -stable  -continue volume removal with HD  -c/w bb  -repeat echo with stable borderline lv fxn    #Chronic AF  -rates stable  -ccb on hold  -BB now at  12.5 mg BID per NSICU   -c/w eliquis 5 mg BID     #HTN  -stable  -c/w meds per NSICU    #CAD, s/p PCI, most recent 2/2022  -c/w ac, asa  -plavix on hold per neurosx   -statin     #carotid stenosis   -previous MRA  noted with Greater than 75% stenosis of the right distal common carotid artery. Approximately 60% stenosis of the proximal left internal carotid artery.  -carotid dopplers 5/11 noted :  There is new occlusion of the left internal carotid artery;  greater than 70% stenosis involving the distal right common carotid artery as was seen previously. Mild to moderate flow-limiting stenosis is seen at the left external   carotid artery.  -CTa head and neck 5/12 noted  -await carotid angio, poss carotid stenting today by neurosx  -patient remains cardiac optimized to proceed with acceptable cardiac risk  -PER neuro sx - holding plavix, cont ASA/eliquis for afib

## 2022-06-01 NOTE — PROGRESS NOTE ADULT - SUBJECTIVE AND OBJECTIVE BOX
Follow-up Pulm Progress Note    s/p EGD this AM   pt lethargic   Requiring oral suctioning every few hrs - clear secretions   Sats 97% 2LNC       Medications:  MEDICATIONS  (STANDING):  allopurinol 100 milliGRAM(s) Oral daily  apixaban 2.5 milliGRAM(s) Oral two times a day  aspirin  chewable 81 milliGRAM(s) Oral daily  atorvastatin 40 milliGRAM(s) Oral at bedtime  chlorhexidine 4% Liquid 1 Application(s) Topical <User Schedule>  insulin lispro (ADMELOG) corrective regimen sliding scale   SubCutaneous every 6 hours  lidocaine   4% Patch 1 Patch Transdermal daily  metoprolol tartrate 12.5 milliGRAM(s) Oral every 12 hours  Nephro-reena 1 Tablet(s) Oral daily  ofloxacin 0.3% Solution 1 Drop(s) Right EYE four times a day  pantoprazole Infusion 8 mG/Hr (10 mL/Hr) IV Continuous <Continuous>  phenylephrine    Infusion 0.5 MICROgram(s)/kG/Min (22.7 mL/Hr) IV Continuous <Continuous>  polyethylene glycol 3350 17 Gram(s) Oral two times a day  povidone iodine 10% Solution 1 Application(s) Topical daily  senna 2 Tablet(s) Oral at bedtime  sucralfate 1 Gram(s) Oral every 12 hours    MEDICATIONS  (PRN):  bisacodyl 5 milliGRAM(s) Oral every 12 hours PRN Constipation          Vital Signs Last 24 Hrs  T(C): 36.7 (01 Jun 2022 07:00), Max: 37.1 (31 May 2022 15:00)  T(F): 98.1 (01 Jun 2022 07:00), Max: 98.7 (31 May 2022 15:00)  HR: 121 (01 Jun 2022 13:35) (73 - 134)  BP: 112/39 (01 Jun 2022 13:35) (112/39 - 149/51)  BP(mean): 59 (01 Jun 2022 13:35) (59 - 83)  RR: 20 (01 Jun 2022 13:35) (15 - 33)  SpO2: 100% (01 Jun 2022 13:35) (86% - 100%)    ABG - ( 30 May 2022 22:38 )  pH, Arterial: 7.48  pH, Blood: x     /  pCO2: 35    /  pO2: 85    / HCO3: 26    / Base Excess: 2.5   /  SaO2: 98.6              VBG pH 7.42 05-30 @ 23:02    VBG pCO2 45 05-30 @ 23:02    VBG O2 sat 40.8 05-30 @ 23:02    VBG lactate -- 05-30 @ 23:02      05-31 @ 07:01  -  06-01 @ 07:00  --------------------------------------------------------  IN: 2178.3 mL / OUT: 0 mL / NET: 2178.3 mL          LABS:                        8.4    19.31 )-----------( 323      ( 01 Jun 2022 05:11 )             26.8     05-31    135  |  97  |  26<H>  ----------------------------<  121<H>  3.5   |  25  |  3.41<H>    Ca    8.4      31 May 2022 22:52  Phos  1.6     05-31  Mg     1.8     05-31    TPro  6.1  /  Alb  2.9<L>  /  TBili  0.4  /  DBili  x   /  AST  12  /  ALT  <5<L>  /  AlkPhos  121<H>  05-30          CAPILLARY BLOOD GLUCOSE  211 (30 May 2022 22:56)      POCT Blood Glucose.: 138 mg/dL (01 Jun 2022 12:21)    PT/INR - ( 01 Jun 2022 05:11 )   PT: 25.7 sec;   INR: 2.20 ratio         PTT - ( 01 Jun 2022 05:11 )  PTT:32.5 sec    Procalcitonin, Serum: 0.61 ng/mL (05-31-22 @ 02:28)    Serum Pro-Brain Natriuretic Peptide: 9179 pg/mL (05-31-22 @ 02:28)            CULTURES:     Culture - Blood (collected 05-31-22 @ 02:33)  Source: .Blood Blood  Preliminary Report (06-01-22 @ 09:01):    No growth to date.    Culture - Blood (collected 05-31-22 @ 02:33)  Source: .Blood Blood  Preliminary Report (06-01-22 @ 09:01):    No growth to date.            Physical Examination:  PULM: decreased BS L base   CVS: S1, S2 heard    RADIOLOGY REVIEWED  CXR 5/31: R costophrenic angle off film  small L pl effusion     CT chest: < from: CT Chest No Cont (05.27.22 @ 18:46) >  FINDINGS:    LUNGS/PLEURA/AIRWAYS: Patent central airways.  Moderate left pleural   effusion, increased from prior exam, associated with near complete   atelectasis of the left lower lobe. Small right pleural effusion with   adjacent subsegmental atelectasis. Right-sided pleural thickening not   significantly changed from prior exam. Bilateral calcified granulomas.  MEDIASTINUM AND STEPHON: No large mediastinal lymph nodes.  VESSELS: Atherosclerotic calcification of the aorta and its branches.   Right IJ central venous catheter with tip terminating at the superior   cavoatrial junction.  HEART: Cardiomegaly. No pericardial effusion.  CHEST WALL AND LOWER NECK: Within normal limits.  VISUALIZED UPPER ABDOMEN: Unchanged nonspecific bilateral perinephric   stranding.  BONES: Degenerative changes of the spine.    IMPRESSION:  Interval increase in left pleural effusion with near complete atelectasis   of the left lower lobe. Stable small right pleural effusion and pleural   thickening.      < end of copied text >

## 2022-06-02 NOTE — PROGRESS NOTE ADULT - PROBLEM SELECTOR PLAN 5
-Carotid duplex with greater than 70% stenosis involving the distal R common carotid artery  -MR head with multiple small acute infarct   -Neuro sx recs noted, plans for possible angio and stent placement vs. CEA

## 2022-06-02 NOTE — PROGRESS NOTE ADULT - SUBJECTIVE AND OBJECTIVE BOX
DATE OF SERVICE: 06-02-22 @ 09:12  CHIEF COMPLAINT:Patient is a 67y old  Male who presents with a chief complaint of ICA Stenosis (02 Jun 2022 06:39)    	        PAST MEDICAL & SURGICAL HISTORY:  HTN (hypertension), benign      HLD (hyperlipidemia)      DM type 2 (diabetes mellitus, type 2)      TIA (transient ischemic attack)      Atrial fibrillation      MI (myocardial infarction)  circa 2014 and 2022.      CAD (coronary artery disease)      Neuropathy      Stage 3 chronic kidney disease      2019 novel coronavirus disease (COVID-19)  12/2021.  Received the COVID-19 vaccine x 2 as of April 2022.      Status post angioplasty with stent  LULU x 3 2/7/2014      S/P carotid endarterectomy  left              sleepy  arousable  no cp or sob  no n/v  eeg undergoing  Medications:  MEDICATIONS  (STANDING):  allopurinol 100 milliGRAM(s) Oral daily  apixaban 2.5 milliGRAM(s) Oral two times a day  aspirin  chewable 81 milliGRAM(s) Oral daily  atorvastatin 40 milliGRAM(s) Oral at bedtime  chlorhexidine 4% Liquid 1 Application(s) Topical <User Schedule>  insulin lispro (ADMELOG) corrective regimen sliding scale   SubCutaneous every 6 hours  lidocaine   4% Patch 1 Patch Transdermal daily  metoprolol tartrate 12.5 milliGRAM(s) Oral every 6 hours  Nephro-reena 1 Tablet(s) Oral daily  ofloxacin 0.3% Solution 1 Drop(s) Right EYE four times a day  pantoprazole Infusion 8 mG/Hr (10 mL/Hr) IV Continuous <Continuous>  polyethylene glycol 3350 17 Gram(s) Oral two times a day  povidone iodine 10% Solution 1 Application(s) Topical daily  senna 2 Tablet(s) Oral at bedtime  sucralfate 1 Gram(s) Oral every 12 hours    MEDICATIONS  (PRN):  bisacodyl 5 milliGRAM(s) Oral every 12 hours PRN Constipation    	    PHYSICAL EXAM:  T(C): 37.2 (06-02-22 @ 07:00), Max: 37.2 (06-02-22 @ 07:00)  HR: 106 (06-02-22 @ 08:00) (81 - 134)  BP: 112/39 (06-01-22 @ 13:35) (112/39 - 149/51)  RR: 19 (06-02-22 @ 08:00) (15 - 33)  SpO2: 94% (06-02-22 @ 08:00) (86% - 100%)  Wt(kg): --  I&O's Summary    01 Jun 2022 07:01  -  02 Jun 2022 07:00  --------------------------------------------------------  IN: 908.7 mL / OUT: 0 mL / NET: 908.7 mL        HEENT:   Normal oral mucosa, PERRL, EOMI	    Cardiovascular: reg irre  Respiratory: dec b  Gastrointestinal:  Soft, Non-tender, + BS	    Neurologic: r sided weakness  pvd    TELEMETRY: 	    ECG:  	  RADIOLOGY:  OTHER: 	  	  LABS:	 	    CARDIAC MARKERS:                                8.4    13.07 )-----------( 253      ( 02 Jun 2022 05:27 )             27.6     06-01    137  |  99  |  40<H>  ----------------------------<  143<H>  4.8   |  23  |  5.46<H>    Ca    8.3<L>      01 Jun 2022 22:06  Phos  3.4     06-01  Mg     2.2     06-01      proBNP:   Lipid Profile:   HgA1c:   TSH:

## 2022-06-02 NOTE — PROGRESS NOTE ADULT - SUBJECTIVE AND OBJECTIVE BOX
SUBJECTIVE: now in ICU, s/p colonoscopy  less responsive today    Medications:  allopurinol 100 milliGRAM(s) Oral daily  apixaban 2.5 milliGRAM(s) Oral two times a day  aspirin  chewable 81 milliGRAM(s) Oral daily  atorvastatin 40 milliGRAM(s) Oral at bedtime  bisacodyl 5 milliGRAM(s) Oral every 12 hours PRN  chlorhexidine 4% Liquid 1 Application(s) Topical <User Schedule>  insulin lispro (ADMELOG) corrective regimen sliding scale   SubCutaneous every 6 hours  lidocaine   4% Patch 1 Patch Transdermal daily  metoprolol tartrate 12.5 milliGRAM(s) Oral every 6 hours  Nephro-reena 1 Tablet(s) Oral daily  ofloxacin 0.3% Solution 1 Drop(s) Right EYE four times a day  pantoprazole Infusion 8 mG/Hr IV Continuous <Continuous>  polyethylene glycol 3350 17 Gram(s) Oral two times a day  povidone iodine 10% Solution 1 Application(s) Topical daily  senna 2 Tablet(s) Oral at bedtime  sucralfate 1 Gram(s) Oral every 12 hours      Labs:  CBC Full  -  ( 02 Jun 2022 05:27 )  WBC Count : 13.07 K/uL  RBC Count : 3.17 M/uL  Hemoglobin : 8.4 g/dL  Hematocrit : 27.6 %  Platelet Count - Automated : 253 K/uL  Mean Cell Volume : 87.1 fl  Mean Cell Hemoglobin : 26.5 pg  Mean Cell Hemoglobin Concentration : 30.4 gm/dL  Auto Neutrophil # : x  Auto Lymphocyte # : x  Auto Monocyte # : x  Auto Eosinophil # : x  Auto Basophil # : x  Auto Neutrophil % : x  Auto Lymphocyte % : x  Auto Monocyte % : x  Auto Eosinophil % : x  Auto Basophil % : x    06-01    137  |  99  |  40<H>  ----------------------------<  143<H>  4.8   |  23  |  5.46<H>    Ca    8.3<L>      01 Jun 2022 22:06  Phos  3.4     06-01  Mg     2.2     06-01      CAPILLARY BLOOD GLUCOSE      POCT Blood Glucose.: 129 mg/dL (02 Jun 2022 05:05)  POCT Blood Glucose.: 143 mg/dL (01 Jun 2022 23:07)  POCT Blood Glucose.: 92 mg/dL (01 Jun 2022 18:15)  POCT Blood Glucose.: 138 mg/dL (01 Jun 2022 12:21)      PT/INR - ( 01 Jun 2022 05:11 )   PT: 25.7 sec;   INR: 2.20 ratio         PTT - ( 01 Jun 2022 05:11 )  PTT:32.5 sec        Vitals:  Vital Signs Last 24 Hrs  T(C): 37.2 (02 Jun 2022 07:00), Max: 37.2 (02 Jun 2022 07:00)  T(F): 98.9 (02 Jun 2022 07:00), Max: 98.9 (02 Jun 2022 07:00)  HR: 106 (02 Jun 2022 08:00) (81 - 134)  BP: 112/39 (01 Jun 2022 13:35) (112/39 - 149/51)  BP(mean): 59 (01 Jun 2022 13:35) (59 - 83)  RR: 19 (02 Jun 2022 08:00) (15 - 33)  SpO2: 94% (02 Jun 2022 08:00) (86% - 100%)      NEUROLOGICAL EXAM:    Mental status: awaken to stimuli but not attend, answer questions or follow commands  intermittent yelling    Cranial Nerves: Pupils were equal, round, reactive to light. Extraocular movements were intact. B BTT Facial sensation was intact to light touch. There was no facial asymmetry.      Motor exam: Bulk and tone were normal. difficult to assess as pt in restraints    Reflexes: DTRs depressed, flexor plantars.     Sensation: Intact to noxious,    Coordination: no gross dysmetria    Gait: deferred    NIHSS: 20    Imaging:    ACC: [id number] EXAM:  CT BRAIN                        PROCEDURE DATE:  04/09/2022      INTERPRETATION:  CLINICAL INFORMATION:  Altered mental status, on   anticoagulation .    TECHNIQUE: Multiple contiguous axial images were acquired from the   skullbase to the vertex without the administration of intravenous   contrast.  Coronal and sagittal reformations were made.    COMPARISON: CT head 10/21/2021, brain MRI 02/23/2014.    FINDINGS:    Scattered lacunar infarcts in the bilateral cerebralhemispheres are   grossly stable compared to the 10/21/2021 head CT. No new additional   infarcts are noted over the time interval.    No acute intracranial hemorrhage, mass effect, or midline shift. The   brain demonstrates periventricular hypoattenuation, nonspecific in   etiology but likely representing chronic microvascular ischemic changes.    No abnormal extra-axial fluid collections are present.    The ventricles and sulci demonstrate age appropriate involutional   changes.  The basal cisterns are patent.    The orbits are unremarkable. The paranasal sinuses are clear. The mastoid   air cells are clear. The calvarium is intact.    IMPRESSION:    No acute intracranial abnormality is noted. If the patient has new and   persistent symptoms, consider short interval follow-up head CT or brain   MRI.    --- End of Report ---    GATIOT VILLARREAL MD; Resident Radiologist  This document has been electronically signed.  JESSE CORONA MD; Attending Radiologist  This document has been electronically signed. Apr 9 2022  2:00PM        ACC: [id number] EXAM:  MR BRAIN                          PROCEDURE DATE:  05/11/2022          INTERPRETATION:  MR brain  without gadolinium    CLINICAL INFORMATION: Stroke.    TECHNIQUE: Limited Sagittal T1-weighted images, axial FLAIR images, and   axial diffusion weighted images of the brain were obtained.  Patient was   unable to hold still for remaining sequences.    FINDINGS:   MRI dated 02/23/2014 available for review.    The brain demonstrates small acute infarctions in the BILATERAL posterior   centrum semiovale and LEFT corona radiata and LEFT posterior   periventricular white matter with restricted diffusion. No intracranial   hemorrhage is recognized. No mass effect is found in the brain.    The ventricles, sulci and basal cisterns show mild global atrophy most   prominent within the BILATERAL cerebellum.    The vertebral and internal carotid arteries demonstrate expected flow   voids indicating their patency.    The orbits are unremarkable.  The paranasal sinuses are clear.  The nasal   cavity appears intact.  The nasopharynx is symmetric.  The central skull   base and temporal bones are intact.  The calvarium appears unremarkable.    IMPRESSION: Small acute infarctions in the BILATERAL posterior centrum   semiovale and LEFT corona radiata and LEFT posterior periventricular   white matter with restricted diffusion.   mild global atrophy most   prominent within the BILATERAL cerebellum.    --- End of Report ---            JESÚS PADGETT MD; Attending Radiologist  This document has been electronically signed. May 11 2022  5:47PM          < from: CT Angio Head w/ IV Cont (05.12.22 @ 13:34) >    ACC: [id number] EXAM:  CT ANGIO NECK (W)AW IC                        ACC: [id number] EXAM:  CT ANGIO BRAIN (W)AW IC                          PROCEDURE DATE:  05/12/2022          INTERPRETATION:  CT angiography of the brain and neck    CLINICAL INDICATION: Recent infarcts. Carotid stenosis.    TECHNIQUE: Direct axial CT scanning of the brain and neck was obtained   from the vertex to the level of the clavicular heads after the dynamic   intravenous injection of 44 cc of Omnipaque 300. 0 cc were discarded.   Sagittal and coronal maximum intensity projection reformats were   provided.  Three-dimensional reconstructions were performed by the   radiologist using the [employer] workstation.    Additionally, noncontrast axial CT scanning of the brain was obtained   from the skull base to the vertex. Sagittal and coronal reformats were   provided.    COMPARISON: MRA neck 10/24/2021    FINDINGS:    CT brain:    Previously seen acute infarcts in the bilateral cerebral hemispheres are   redemonstrated. No CT evidence for hemorrhagic transformation.    No hydrocephalus, midline shift, or effacement of basal cisterns.    No acute intracranial hemorrhage or vasogenic edema.    Mild to moderate white matter microvascular ischemic disease.    CTA brain:    Limited by relatively decreased opacification of the arteries.    Multifocal narrowing of the right skull base ICA due to calcified plaque,   the degree of which is not well evaluated.    Normal flow-related enhancement of the supraclinoid right ICA.    Lack of flow related enhancement of the petrous, precavernous, and   cavernous left ICA. Reconstitution of the vessel at its supraclinoid   segment.    Otherwise no flow-limiting stenosis.    Normal flow-related enhancement of the bilateral anterior, middle, and   posterior cerebral arteries.    Normal flow-related enhancement of the intradural vertebral arteries and   basilar artery.    No vascular aneurysm or AVM.    CTA neck:    Stenoses described in this report are on the basis of NASCET criteria.    Study is significantly limited by relatively decreased opacification of   the arteries.    Short segment stenosis of the mid left common carotid artery due to the   presence of partially calcified plaque is poorly evaluated. Flow-related   enhancement of the more proximal and distal left common carotid artery is   noted.    Long segment stenosis of the mid and distal right common carotid artery   due to the presence of partially calcified plaque is poorly evaluated.    Rapidly progressive stenosis of the left internal carotid artery   beginning at its origin. No flow related enhancement of the vessel beyond   its origin.    Normal flow-related enhancement of the bilateral vertebral arteries.    No gross evidence for acute arterial dissection.    IMPRESSION:    CTA brain:  Limited by relatively decreased opacification of the arteries.    Multifocal narrowing of the right skull base ICA due to calcified plaque,   the degree of which is not well evaluated.    Lack of flow related enhancement of the petrous, precavernous, and   cavernous left ICA. Reconstitution of the vessel at its supraclinoid   segment.    Otherwise no flow-limiting stenosis.    No vascular aneurysm or AVM.    CTA neck:  Limited by relatively decreased opacification of the arteries.    Short segment stenosis of the mid left common carotid artery due to the   presence of partially calcified plaque is poorly evaluated. Flow-related   enhancement of the more proximal and distal left common carotid artery is   noted.    Long segment stenosis of the mid and distal right common carotid artery   due to the presence of partially calcified plaque is poorly evaluated.    Rapidly progressive stenosis of the left internal carotid artery   beginning at its origin. No flow related enhancement of the vessel beyond   its origin.    Recommend correlation with contrast-enhanced MRI of the neck for further   evaluation.    --- End of Report ---            MIGUEL ANGEL CARRILLO MD; Attending Radiologist  This document has been electronically signed. May 12 2022  2:47PM    < end of copied text >      < from: MR Head No Cont (05.31.22 @ 08:42) >    ACC: [id number] EXAM:  MR BRAIN                          PROCEDURE DATE:  05/31/2022          INTERPRETATION:  Clinical history: Code stroke.    Limited MRI was performed using axial diffusion and susceptibility   weighted sequence as well as axial T2-weighted sequence. This exam is   compared with prior brain MRI performed on May 11, 2022.    Scattered areas of abnormal T2 prolongation with restricted diffusion is   seen involving the bilateral corona radiata and centrum semiovale region   as well as the left periatrial region. These findings compatible with   areas of acute infarcts. These findings could be secondary to systemic   etiology. Please correlate clinically.    Parenchymal volume loss and chronic microvascular ischemic changes are   identified    Multiple inflammatory changes seen involving the right mastoid.    The visualized paranasal sinuses mastoid and middle ear regions appear   clear.    IMPRESSION: Acute infarcts as described above.    --- End of Report ---            TOMASA BUTLER MD; Attending Radiologist  This document has been electronically signed. May 31 2022  9:06AM    < end of copied text >

## 2022-06-02 NOTE — PROGRESS NOTE ADULT - ASSESSMENT
67M w/ prolonged hospital course on medicine service iso renal failure req dialysis c/b b/l hypoperfusions strokes from R ICA stenosis 70-80% and L ICA occlusion (prior CEA) planned for BOOKER stent but course c/b acute uremia , GIB (melena) , and additional hypoperfusion episode and xfer to nscu. Planned for egd today and possible stent vs CEA to follow.     episodes of melena 5/30-5/31  off PLavix since 5/31, IV PPI BID started 5/31; remains on ASA and Apixaban (PAF) in setting of new strokes and L ICA occlusion      #acute GI blood loss anemia 2/2 bleeding Duodenal ulcer, erosive gastritis (non bleeding), duodenitis and additional non bleeding cratered DU  s/p EGD 6/1/22: Grade C esophagitis, non bleeding erosive gastropathy, duodenitis, Non obstructing bleeding duodenal ulcer (2nd portion, 10mm in largest diameter) with active bleeding - endoclip x4 placed with control of bleeding, additional 6mm non bleeding clean based duodenal ulcer (2nd portion)  High risk for recurrent GI bleeding on triple therapy (DAPT + AC)    -continue PPI gtt x 72 hours, then transition to IV BID PPI  -continue carafate (grade C esophagitis)  -transfuse PRN for goal Hgb >8 (given recent cardiac PCI and known L ICA occlusion with hypoperfusion CVAs)  -IF has recurrent GI bleeding with HD instability, will need to consult IR for angio +/- embolization of GDA.   -expect next few BMs to be dark 2/2 passage of "old"/residual blood from GI tract  -hold laxatives for now as blood is a GI irritant cathartic; monitor BMs    Discussed with family at bedside  Discussed with NSCU team and attending    Nicolas Coleman PA-C    [facility]  [phone]  After hours and weekend coverage [phone]   67M w/ prolonged hospital course on medicine service iso renal failure req dialysis c/b b/l hypoperfusions strokes from R ICA stenosis 70-80% and L ICA occlusion (prior CEA) planned for BOOKER stent but course c/b acute uremia , GIB (melena) , and additional hypoperfusion episode and xfer to nscu. Planned for egd today and possible stent vs CEA to follow.     episodes of melena 5/30-5/31  off PLavix since 5/31, IV PPI BID started 5/31; remains on ASA and Apixaban (PAF) in setting of new strokes and L ICA occlusion      #acute GI blood loss anemia 2/2 bleeding Duodenal ulcer, erosive gastritis (non bleeding), duodenitis and additional non bleeding cratered DU  s/p EGD 6/1/22: Grade C esophagitis, non bleeding erosive gastropathy, duodenitis, Non obstructing bleeding duodenal ulcer (2nd portion, 10mm in largest diameter) with active bleeding - endoclip x4 placed with control of bleeding, additional 6mm non bleeding clean based duodenal ulcer (2nd portion)  High risk for recurrent GI bleeding on triple therapy (DAPT + AC)    -continue PPI gtt x 72 hours, then transition to IV BID PPI  -continue carafate (grade C esophagitis)  -transfuse PRN for goal Hgb >8 (given recent cardiac PCI and known L ICA occlusion with hypoperfusion CVAs)  -IF has recurrent GI bleeding with HD instability, will need to consult IR for angio +/- embolization of GDA.   -expect next few BMs to be dark 2/2 passage of "old"/residual blood from GI tract  -hold laxatives for now as blood is a GI irritant cathartic; monitor BMs  -no GI objection to restarting NGT feeds    Discussed with family at bedside  Discussed with NSCU team and attending    Nicoals Coleman PA-C    [facility]  [phone]  After hours and weekend coverage [phone]

## 2022-06-02 NOTE — SWALLOW BEDSIDE ASSESSMENT ADULT - SPECIFY REASON(S)
to clinically assess swallow function; consult reason "coughing with eating and drinking" to clinically assess swallow function

## 2022-06-02 NOTE — PROGRESS NOTE ADULT - SUBJECTIVE AND OBJECTIVE BOX
INTERVAL HPI/OVERNIGHT EVENTS:  s/p EGD 6/1/22: Grade C esophagitis, non bleeding erosive gastropathy, duodenitis, Non obstructing bleeding duodenal ulcer (2nd portion, 10mm in largest diameter) with active bleeding - endoclip x4 placed with control of bleeding, additional 6mm non bleeding clean based duodenal ulcer (2nd portion)    PPI gtts started post-procedure  s/p additional 1 unit PRBCs yesterday PM (post-procedure)   {hb 8.4 ->7.9 -> 7.5 ->transfusion PRBCs -> 8.4}  1 BM this AM    MEDICATIONS  (STANDING):  allopurinol 100 milliGRAM(s) Oral daily  apixaban 2.5 milliGRAM(s) Oral two times a day  aspirin  chewable 81 milliGRAM(s) Oral daily  atorvastatin 40 milliGRAM(s) Oral at bedtime  chlorhexidine 4% Liquid 1 Application(s) Topical <User Schedule>  epoetin naz-epbx (RETACRIT) Injectable 58797 Unit(s) IV Push once  insulin lispro (ADMELOG) corrective regimen sliding scale   SubCutaneous every 6 hours  lidocaine   4% Patch 1 Patch Transdermal daily  metoprolol tartrate 12.5 milliGRAM(s) Oral every 6 hours  Nephro-reena 1 Tablet(s) Oral daily  ofloxacin 0.3% Solution 1 Drop(s) Right EYE four times a day  pantoprazole Infusion 8 mG/Hr (10 mL/Hr) IV Continuous <Continuous>  polyethylene glycol 3350 17 Gram(s) Oral two times a day  povidone iodine 10% Solution 1 Application(s) Topical daily  senna 2 Tablet(s) Oral at bedtime  sucralfate 1 Gram(s) Oral every 12 hours    MEDICATIONS  (PRN):  bisacodyl 5 milliGRAM(s) Oral every 12 hours PRN Constipation      Allergies  nitrofurantoin (Nephrotoxicity)      Review of Systems:  unable to obtain    Vital Signs Last 24 Hrs  T(C): 37.2 (02 Jun 2022 07:00), Max: 37.2 (02 Jun 2022 07:00)  T(F): 98.9 (02 Jun 2022 07:00), Max: 98.9 (02 Jun 2022 07:00)  HR: 107 (02 Jun 2022 09:00) (81 - 134)  BP: 112/39 (01 Jun 2022 13:35) (112/39 - 149/51)  BP(mean): 59 (01 Jun 2022 13:35) (59 - 83)  RR: 22 (02 Jun 2022 09:00) (15 - 33)  SpO2: 98% (02 Jun 2022 09:00) (86% - 100%)    PHYSICAL EXAM:  Constitutional: NAD awake, confused but responsive  +NGT (kaofeed). spouse at bedside  HEENT: PERRLA, EOMI,  anicteric EEG in progress  Neck:  No JVD  Respiratory: CTAB/L  Cardiovascular: S1 and S2, tachy  Gastrointestinal: BS+, soft, NT/ND, no scars  Extremities: No peripheral edema +chronic stasis changes b/l  +wrist restraints  Neurological: intermittently verbalizes/ responsive. intermittently confused and agitated  and screaming/calling out. +Rt facial droop  Psychiatric: intermittently confused and agitated  : No [place]  Skin: No rashes +chronic stasis changes b/l LE      LABS:                        8.4    13.07 )-----------( 253      ( 02 Jun 2022 05:27 )             27.6   s/p 1 unit PRBCs transfused  Hemoglobin: 7.5 g/dL (06.01.22 @ 22:06)   Hemoglobin: 7.9 g/dL (06.01.22 @ 14:14)   Hemoglobin: 8.4 g/dL (06.01.22 @ 05:11)     06-01    137  |  99  |  40<H>  ----------------------------<  143<H>  4.8   |  23  |  5.46<H>    Ca    8.3<L>      01 Jun 2022 22:06  Phos  3.4     06-01  Mg     2.2     06-01      PT/INR - ( 01 Jun 2022 05:11 )   PT: 25.7 sec;   INR: 2.20 ratio    PTT - ( 01 Jun 2022 05:11 )  PTT:32.5 sec      RADIOLOGY & ADDITIONAL TESTS:

## 2022-06-02 NOTE — PROGRESS NOTE ADULT - ASSESSMENT
67M PMH of CAD, NSTEMI s/p 3 stents in 2014 (stents to LAD, proximal and distal LCx), ischemic cardiomyopathy, HTN for 10 years, T2DM for 26 years c/b peripheral neuropathy, CVA, TIA, Afib on Pradaxa, chronic RLE wound, L carotid stenosis s/p endarterectomy (2014) just recently admitted  to the [facility] on 2/19/2022 with acute onset chest pain for one day found to have an NSTEMI, CAD, s/p PCI in setting of increased AF rates off bb for 3 days and resolved chest pain, his CKMB peaked and Echo noted with EF 60%,normal LV function, mild TR, mild ND. He was s/p LHC with 85% proximal LAD s/p balloon angioplasty and LULU. He presented to [facility] ED with decreased urine output over the week. Carotid duplex showed Calcified atherosclerotic disease seen throughout the carotid systems. There is new occlusion of the left internal carotid artery. There is greater than 70% stenosis involving the distal right common carotid artery as was seen previously. Mild to moderate flow-limiting stenosis is seen at the left external carotid artery. Vascular surgery consulted for carotid stenosis. Pt currently denies vision changes, dizziness, lightheadedness or any other symptoms at this point in time.     PLAN:  -reviewed at bedside w pt's wife and via cell pt's daughter   ctg and cta results and  the   neurosurg recs that pt be re eval for rt cea  since he is not a candidate for rt ica stent and long term antiplt rx  due to gi bleeding and  recent gi intervention and cerebral anatomy  d/w them that a rt cea is high risk greater than 10-15%  intra op or post op cerebral event w possible neuro deterioration, vent dependance,  peg placement and possible  worsening of his debilitated state  advised them  to consider these risks before any decision for cea  wife and daughter to discuss and decide  will follow

## 2022-06-02 NOTE — SWALLOW BEDSIDE ASSESSMENT ADULT - SWALLOW EVAL: DIAGNOSIS
Orders received and appreciated. Pt attempted to be seen for swallow evaluation on this date, however, pt currently undergoing HD. Pt noted with yelling in hallway, with noted baseline wet vocal quality on secretions. RICHARD Frazier reporting poor participation in all directives. This service will re-attempt evaluation at a later time as clinically indicated. D/W RICHARD Frazier and FERNANDO Tilley.

## 2022-06-02 NOTE — PROGRESS NOTE ADULT - ASSESSMENT
Echo 5/13/22:  1. Normal left ventricular internal dimensions and wall  thicknesses.  2. Endocardial visualization enhanced with intravenous  injection of Ultrasonic Enhancing Agent (Lumason). Grossly  borderline normal left ventricular systolic function; no  obvious segmental wall motion abnormality.  3. The right ventricle is not well visualized.  *** Compared with echocardiogram of 2/22/2022, no  significant changes noted.      A/P    66 y/o M with history of CAD, s/p multiple PCI, with most recent 2/22 PCI of LAD in setting of NSTEMI, on ASA only (pradaxa), chronic atrial fibrillation maintained on Pradaxa, essential HTN, type 2 DM previously on oral medications but on insulin, diabetic neuropathy with chronic RIGHT LE venous stasis changes>left, LEFT foot ulcer seen by podiatry, past endarterectomy LEFT CEA in 2014 presenting with 1 week of decreased urine output, cystitis with hematuria     #ANT on CKD, new HD  -oliguric renal failure in setting of UTI/cystitis  -New HD for uremia, renal failure  -s/p L AVG 4/18, s/p permacath placement 4/20  -renal f/ u    #AMS/Lethargy  -ams likely from pain meds, embolic cva  -repeat CT 4/27 unremarkable  -MRI 5/11 revealed small acute infarctions in bilateral posterior centrum semiovale and left corona radiata and left posterior periventricular white matter  -repeat echo with no interval changes  -sp RRT 5/3- repeat imaging noted - transferred to NSICU  -neuro following       #Acute on chronic HFpEF  -stable  -continue volume removal with HD  -c/w bb  -repeat echo with stable borderline lv fxn    #Chronic AF  -rates stable  -ccb on hold  -BB adjustment  per NSICU   -c/w eliquis 5 mg BID     #HTN  -stable  -c/w meds per NSICU    #CAD, s/p PCI, most recent 2/2022  -c/w ac, asa  -plavix on hold per neurosx   -statin     #carotid stenosis   -previous MRA  noted with Greater than 75% stenosis of the right distal common carotid artery. Approximately 60% stenosis of the proximal left internal carotid artery.  -carotid dopplers 5/11 noted :  There is new occlusion of the left internal carotid artery;  greater than 70% stenosis involving the distal right common carotid artery as was seen previously. Mild to moderate flow-limiting stenosis is seen at the left external   carotid artery.  -CTa head and neck 5/12 noted  -await carotid angio, likely planning CEA per d/w with NSICU-- will discuss with attending in regards CV risk   -holding plavix, cont ASA/eliquis for afib    #anemia  -sp egd 6/1 Grade C esophagitis, non bleeding erosive gastropathy, duodenitis, Non obstructing bleeding duodenal ulcer (2nd portion, 10mm in largest diameter) with active bleeding - endoclip x4 placed with control of bleeding, additional 6mm non bleeding clean based duodenal ulcer (2nd portion)  -on ppi gtt, sp PRBC , trend cbc   -GI fu

## 2022-06-02 NOTE — PROGRESS NOTE ADULT - SUBJECTIVE AND OBJECTIVE BOX
HPI:  Patient remotely known to me from an admission to [facility] in Feb 2017--assigned to me at this point by the ER to admit this 68 y/o M--followed by his nephrologist above--patient with a history of CAD with past multiple PCI, with most recent discharge from [facility] in Feb 2022 for non ST elevation MI with LULU of an 85% prox LAD lesion with patient on ASA and now off Plavix per cardiology (only for one month), chronic atrial fibrillation maintained on Pradaxa, essential HTN, type 2 DM previously on oral medications but on insulin, diabetic neuropathy with chronic RIGHT LE venous stasis changes>left, LEFT foot ulcer seen by podiatry, past endarterectomy LEFT CEA in 2014. with patient self referring to the ER following apparently treatment by an urgent care center for an apparent cystitis with hematuria on nitrofurantoin but with patient noting decreased urine output for the past week, and difficulty with B/L coarse tremors for several weeks, with patient having difficulty negotiating his applications on his smart phone (observed by examiner).  Patient with increasing B/L LE oedema and weight gain, with patient with 2 pillow orthopnoea.      OVERNIGHT EVENTS:   No acute events overnight.    VITALS:  T(C): , Max: 37 (06-01-22 @ 19:00)  HR:  (73 - 134)  BP:  (112/39 - 149/51)  ABP:  (107/43 - 175/63)  RR:  (15 - 33)  SpO2:  (86% - 100%)  Wt(kg): --      05-31-22 @ 07:01  -  06-01-22 @ 07:00  --------------------------------------------------------  IN: 2178.3 mL / OUT: 0 mL / NET: 2178.3 mL    06-01-22 @ 07:01  -  06-02-22 @ 06:40  --------------------------------------------------------  IN: 908.7 mL / OUT: 0 mL / NET: 908.7 mL      LABS:  Na: 137 (06-01 @ 22:06), 135 (05-31 @ 22:52), 135 (05-31 @ 02:28), 133 (05-30 @ 22:40)  K: 4.8 (06-01 @ 22:06), 3.5 (05-31 @ 22:52), 3.9 (05-31 @ 02:28), 4.2 (05-30 @ 22:40)  Cl: 99 (06-01 @ 22:06), 97 (05-31 @ 22:52), 94 (05-31 @ 02:28), 95 (05-30 @ 22:40)  CO2: 23 (06-01 @ 22:06), 25 (05-31 @ 22:52), 22 (05-31 @ 02:28), 23 (05-30 @ 22:40)  BUN: 40 (06-01 @ 22:06), 26 (05-31 @ 22:52), 68 (05-31 @ 02:28), 64 (05-30 @ 22:40)  Cr: 5.46 (06-01 @ 22:06), 3.41 (05-31 @ 22:52), 6.99 (05-31 @ 02:28), 6.75 (05-30 @ 22:40)  Glu: 143(06-01 @ 22:06), 121(05-31 @ 22:52), 232(05-31 @ 02:28), 208(05-30 @ 22:40)    Hgb: 8.4 (06-02 @ 05:27), 7.5 (06-01 @ 22:06), 7.9 (06-01 @ 14:14), 8.4 (06-01 @ 05:11), 7.8 (05-31 @ 22:50), 7.1 (05-31 @ 15:48), 6.4 (05-31 @ 09:40), 7.1 (05-31 @ 02:28), 7.1 (05-30 @ 22:40)  Hct: 27.6 (06-02 @ 05:27), 24.7 (06-01 @ 22:06), 26.2 (06-01 @ 14:14), 26.8 (06-01 @ 05:11), 26.0 (05-31 @ 22:50), 23.5 (05-31 @ 15:48), 22.3 (05-31 @ 09:40), 24.0 (05-31 @ 02:28), 24.1 (05-30 @ 22:40)  WBC: 13.07 (06-02 @ 05:27), 14.03 (06-01 @ 22:06), 16.41 (06-01 @ 14:14), 19.31 (06-01 @ 05:11), 20.91 (05-31 @ 22:50), 19.72 (05-31 @ 15:48), 19.15 (05-31 @ 09:40), 17.08 (05-31 @ 02:28), 17.38 (05-30 @ 22:40)  Plt: 253 (06-02 @ 05:27), 259 (06-01 @ 22:06), 288 (06-01 @ 14:14), 323 (06-01 @ 05:11), 327 (05-31 @ 22:50), 396 (05-31 @ 15:48), 407 (05-31 @ 09:40), 440 (05-31 @ 02:28), 412 (05-30 @ 22:40)    INR: 2.20 06-01-22 @ 05:11, 2.46 05-31-22 @ 02:35  PTT: 32.5 06-01-22 @ 05:11, 33.2 05-31-22 @ 02:35, 34.1 05-30-22 @ 22:40    IMAGING:   Recent imaging studies were reviewed.    MEDICATIONS:  allopurinol 100 milliGRAM(s) Oral daily  apixaban 2.5 milliGRAM(s) Oral two times a day  aspirin  chewable 81 milliGRAM(s) Oral daily  atorvastatin 40 milliGRAM(s) Oral at bedtime  bisacodyl 5 milliGRAM(s) Oral every 12 hours PRN  chlorhexidine 4% Liquid 1 Application(s) Topical <User Schedule>  insulin lispro (ADMELOG) corrective regimen sliding scale   SubCutaneous every 6 hours  lidocaine   4% Patch 1 Patch Transdermal daily  metoprolol tartrate 12.5 milliGRAM(s) Oral every 6 hours  Nephro-reena 1 Tablet(s) Oral daily  ofloxacin 0.3% Solution 1 Drop(s) Right EYE four times a day  pantoprazole Infusion 8 mG/Hr IV Continuous <Continuous>  polyethylene glycol 3350 17 Gram(s) Oral two times a day  povidone iodine 10% Solution 1 Application(s) Topical daily  senna 2 Tablet(s) Oral at bedtime  sucralfate 1 Gram(s) Oral every 12 hours    EXAMINATION:  General:  calm  HEENT:  MMM  Neuro:  Awake, oriented x 1, following commands, right sided weakness UE 3/5, LE 2/5  Cards:  RRR  Respiratory:  no respiratory distress  Adomen:  soft  Extremities:  no edema  Skin:  warm/dry   HPI:  Patient remotely known to me from an admission to [facility] in Feb 2017--assigned to me at this point by the ER to admit this 68 y/o M--followed by his nephrologist above--patient with a history of CAD with past multiple PCI, with most recent discharge from [facility] in Feb 2022 for non ST elevation MI with LULU of an 85% prox LAD lesion with patient on ASA and now off Plavix per cardiology (only for one month), chronic atrial fibrillation maintained on Pradaxa, essential HTN, type 2 DM previously on oral medications but on insulin, diabetic neuropathy with chronic RIGHT LE venous stasis changes>left, LEFT foot ulcer seen by podiatry, past endarterectomy LEFT CEA in 2014. with patient self referring to the ER following apparently treatment by an urgent care center for an apparent cystitis with hematuria on nitrofurantoin but with patient noting decreased urine output for the past week, and difficulty with B/L coarse tremors for several weeks, with patient having difficulty negotiating his applications on his smart phone (observed by examiner).  Patient with increasing B/L LE oedema and weight gain, with patient with 2 pillow orthopnoea.      OVERNIGHT EVENTS:   Received one unit PRBC  overnight.    VITALS:  T(C): , Max: 37 (06-01-22 @ 19:00)  HR:  (73 - 134)  BP:  (112/39 - 149/51)  ABP:  (107/43 - 175/63)  RR:  (15 - 33)  SpO2:  (86% - 100%)  Wt(kg): --      05-31-22 @ 07:01  -  06-01-22 @ 07:00  --------------------------------------------------------  IN: 2178.3 mL / OUT: 0 mL / NET: 2178.3 mL    06-01-22 @ 07:01  -  06-02-22 @ 06:40  --------------------------------------------------------  IN: 908.7 mL / OUT: 0 mL / NET: 908.7 mL      LABS:  Na: 137 (06-01 @ 22:06), 135 (05-31 @ 22:52), 135 (05-31 @ 02:28), 133 (05-30 @ 22:40)  K: 4.8 (06-01 @ 22:06), 3.5 (05-31 @ 22:52), 3.9 (05-31 @ 02:28), 4.2 (05-30 @ 22:40)  Cl: 99 (06-01 @ 22:06), 97 (05-31 @ 22:52), 94 (05-31 @ 02:28), 95 (05-30 @ 22:40)  CO2: 23 (06-01 @ 22:06), 25 (05-31 @ 22:52), 22 (05-31 @ 02:28), 23 (05-30 @ 22:40)  BUN: 40 (06-01 @ 22:06), 26 (05-31 @ 22:52), 68 (05-31 @ 02:28), 64 (05-30 @ 22:40)  Cr: 5.46 (06-01 @ 22:06), 3.41 (05-31 @ 22:52), 6.99 (05-31 @ 02:28), 6.75 (05-30 @ 22:40)  Glu: 143(06-01 @ 22:06), 121(05-31 @ 22:52), 232(05-31 @ 02:28), 208(05-30 @ 22:40)    Hgb: 8.4 (06-02 @ 05:27), 7.5 (06-01 @ 22:06), 7.9 (06-01 @ 14:14), 8.4 (06-01 @ 05:11), 7.8 (05-31 @ 22:50), 7.1 (05-31 @ 15:48), 6.4 (05-31 @ 09:40), 7.1 (05-31 @ 02:28), 7.1 (05-30 @ 22:40)  Hct: 27.6 (06-02 @ 05:27), 24.7 (06-01 @ 22:06), 26.2 (06-01 @ 14:14), 26.8 (06-01 @ 05:11), 26.0 (05-31 @ 22:50), 23.5 (05-31 @ 15:48), 22.3 (05-31 @ 09:40), 24.0 (05-31 @ 02:28), 24.1 (05-30 @ 22:40)  WBC: 13.07 (06-02 @ 05:27), 14.03 (06-01 @ 22:06), 16.41 (06-01 @ 14:14), 19.31 (06-01 @ 05:11), 20.91 (05-31 @ 22:50), 19.72 (05-31 @ 15:48), 19.15 (05-31 @ 09:40), 17.08 (05-31 @ 02:28), 17.38 (05-30 @ 22:40)  Plt: 253 (06-02 @ 05:27), 259 (06-01 @ 22:06), 288 (06-01 @ 14:14), 323 (06-01 @ 05:11), 327 (05-31 @ 22:50), 396 (05-31 @ 15:48), 407 (05-31 @ 09:40), 440 (05-31 @ 02:28), 412 (05-30 @ 22:40)    INR: 2.20 06-01-22 @ 05:11, 2.46 05-31-22 @ 02:35  PTT: 32.5 06-01-22 @ 05:11, 33.2 05-31-22 @ 02:35, 34.1 05-30-22 @ 22:40    IMAGING:   Recent imaging studies were reviewed.    MEDICATIONS:  allopurinol 100 milliGRAM(s) Oral daily  apixaban 2.5 milliGRAM(s) Oral two times a day  aspirin  chewable 81 milliGRAM(s) Oral daily  atorvastatin 40 milliGRAM(s) Oral at bedtime  bisacodyl 5 milliGRAM(s) Oral every 12 hours PRN  chlorhexidine 4% Liquid 1 Application(s) Topical <User Schedule>  insulin lispro (ADMELOG) corrective regimen sliding scale   SubCutaneous every 6 hours  lidocaine   4% Patch 1 Patch Transdermal daily  metoprolol tartrate 12.5 milliGRAM(s) Oral every 6 hours  Nephro-reena 1 Tablet(s) Oral daily  ofloxacin 0.3% Solution 1 Drop(s) Right EYE four times a day  pantoprazole Infusion 8 mG/Hr IV Continuous <Continuous>  polyethylene glycol 3350 17 Gram(s) Oral two times a day  povidone iodine 10% Solution 1 Application(s) Topical daily  senna 2 Tablet(s) Oral at bedtime  sucralfate 1 Gram(s) Oral every 12 hours    EXAMINATION:  General:  calm  HEENT:  MMM  Neuro:  Awake, Agitated, oriented x 0, following commands, right sided weakness UE 3/5, LE 2/5  Cards:  RRR  Respiratory:  no respiratory distress  Adomen:  soft  Extremities:  no edema  Skin:  warm/dry

## 2022-06-02 NOTE — PROGRESS NOTE ADULT - PROBLEM SELECTOR PLAN 1
Likely 2nd to b/l pl effusions, atelectasis  -Suspect fluid overload given elevated proBNP, LE edema on admission   -Keep O>I with HD as tolerated   -Pt with hx of hydroPTX. CT chest in 2/2022 with pleural thickening, atelectasis. Findings at the time suspected to be chronic. CT A/P 4/2 with small b/l pl effusions L>R, pleural thickening  -CT chest 4/4 with small b/l pl effusion R>L, bibasilar atelectasis  -Appeared mildly labored last week. CT chest 5/27 with no PNA, but with increase in L pl effusion. No longer with labored breathing, no plans for thoracentesis. Suggest optimization of fluid removal with HD.   -Keep sats >90% with supplemental O2 PRN (currently RA)  -Suction PRN

## 2022-06-02 NOTE — PROGRESS NOTE ADULT - ASSESSMENT
67 year old gentleman with PMH of CAD, NSTEMI s/p 3 stents in 2014 (stents to LAD, proximal and distal LCx), ischemic cardiomyopathy, HTN for 10 years, T2DM for 26 years c/b peripheral neuropathy, CVA, TIA, Afib on Pradaxa, chronic RLE wound, L carotid stenosis s/p endarterectomy (2014) just recently admitted  to the [facility] on 2/19/2022 with acute onset chest pain for one day found to have an NSTEMI, CAD, s/p PCI in setting of increased AF rates off bb for 3 days and resolved chest pain, his CKMB peaked and Echo noted with EF 60%,normal LV function, mild TR, mild NM. He was s/p LHC with 85% proximal LAD s/p balloon angioplasty and LULU. He presented to [facility] ED with decreased urine output over the week. [facility] and was started  on Nitrofurantoin. Pt is still taking Nitrofurantoin w/ 5 days left. He came to the ED due to worsening symptoms. Pt reports having a similar incident 4-5 years ago that resolved spontaneously. He reports increased leg edema Dyspnea worse on exertion. his baseline Serum creatinine is in the 2.0 to 2.3 mg/dl range. ANT with serum creatinine of 8.29 with bun 144 and k 5.5      1- ESRD   2- chf chronic   3- hyperphosphatemia /shpt   4- anemia   5- hyperlipidemia   6- HTN /a fib  7- TIA hx   8- hx of carotid stenosis  9- GIB      HD today  F180, 240 min, , , 0.5L fluid restriction  see HD flowsheet   off pressors  blood cx NGTD  anemia - required prbc  5/31  epogen [id number] Units TIW with HD.    s/p EGD 6/1 for melena, found duodenal ulcer s/p clip  trend hgb  d/w wife and nscu team  67 year old gentleman with PMH of CAD, NSTEMI s/p 3 stents in 2014 (stents to LAD, proximal and distal LCx), ischemic cardiomyopathy, HTN for 10 years, T2DM for 26 years c/b peripheral neuropathy, CVA, TIA, Afib on Pradaxa, chronic RLE wound, L carotid stenosis s/p endarterectomy (2014) just recently admitted  to the [facility] on 2/19/2022 with acute onset chest pain for one day found to have an NSTEMI, CAD, s/p PCI in setting of increased AF rates off bb for 3 days and resolved chest pain, his CKMB peaked and Echo noted with EF 60%,normal LV function, mild TR, mild MA. He was s/p LHC with 85% proximal LAD s/p balloon angioplasty and LULU. He presented to [facility] ED with decreased urine output over the week. [facility] and was started  on Nitrofurantoin. Pt is still taking Nitrofurantoin w/ 5 days left. He came to the ED due to worsening symptoms. Pt reports having a similar incident 4-5 years ago that resolved spontaneously. He reports increased leg edema Dyspnea worse on exertion. his baseline Serum creatinine is in the 2.0 to 2.3 mg/dl range. ANT with serum creatinine of 8.29 with bun 144 and k 5.5      1- ESRD   2- chf chronic   3- hyperphosphatemia /shpt   4- anemia   5- hyperlipidemia   6- HTN /a fib  7- TIA hx   8- hx of carotid stenosis  9- GIB      HD today  F180, 240 min, , , 0.5L fluid removal   see HD flowsheet   off pressors  blood cx NGTD  anemia - required prbc  5/31  epogen [id number] Units TIW with HD.    s/p EGD 6/1 for melena, found duodenal ulcer s/p clip  trend hgb  d/w wife and nscu team

## 2022-06-02 NOTE — PROGRESS NOTE ADULT - SUBJECTIVE AND OBJECTIVE BOX
NEUROCRITICAL CARE EVENING NOTE    BRIEF SUMMARY:       REVIEW OF SYSTEMS: [x] Unable to Assess due to neurologic exam   [ ] All ROS addressed below are non-contributory, except:  Neuro: [ ] Headache [ ] Back pain [ ] Numbness [ ] Weakness [ ] Ataxia [ ] Dizziness [ ] Aphasia [ ] Dysarthria [ ] Visual disturbance  Resp: [ ] Shortness of breath/dyspnea [ ] Orthopnea [ ] Cough  CV: [ ] Chest pain [ ] Palpitation [ ] Lightheadedness [ ] Syncope  Renal: [ ] Thirst [ ] Edema  GI: [ ] Nausea [ ] Emesis [ ] Abdominal pain [ ] Constipation [ ] Diarrhea  Hem: [ ] Hematemesis [ ] bBright red blood per rectum  ID: [ ] Fever [ ] Chills [ ] Dysuria  ENT: [ ] Rhinorrhea    VITALS/IMAGING/DATA/IVF FLUIDS/MEDICATIONS: [x] Reviewed    ALLERGIES: Allergies  nitrofurantoin (Nephrotoxicity)  Intolerances    EXAMINATION:  General:  calm   HEENT:  right eye shut w/ crusting   Neuro:  eyes open with voice, intermittently has simple verbal output(curses when touched), not following commands right sided weakness UE 3/5, LE 2/5, left UE & LE spotaneous  Cards:  RRR  Respiratory:  no respiratory distress  Abdomen:  soft  Extremities:  no edema, left arm w/ AVF w/ palpable thrill, tight knee tophi   Skin:  warm/dry   NEUROCRITICAL CARE EVENING NOTE    BRIEF SUMMARY:       REVIEW OF SYSTEMS: [x] Unable to Assess due to neurologic exam   [ ] All ROS addressed below are non-contributory, except:  Neuro: [ ] Headache [ ] Back pain [ ] Numbness [ ] Weakness [ ] Ataxia [ ] Dizziness [ ] Aphasia [ ] Dysarthria [ ] Visual disturbance  Resp: [ ] Shortness of breath/dyspnea [ ] Orthopnea [ ] Cough  CV: [ ] Chest pain [ ] Palpitation [ ] Lightheadedness [ ] Syncope  Renal: [ ] Thirst [ ] Edema  GI: [ ] Nausea [ ] Emesis [ ] Abdominal pain [ ] Constipation [ ] Diarrhea  Hem: [ ] Hematemesis [ ] bBright red blood per rectum  ID: [ ] Fever [ ] Chills [ ] Dysuria  ENT: [ ] Rhinorrhea    VITALS/IMAGING/DATA/IVF FLUIDS/MEDICATIONS: [x] Reviewed    ALLERGIES: Allergies  nitrofurantoin (Nephrotoxicity)  Intolerances    EXAMINATION:  General:  calm   HEENT:  right eye shut w/ crusting tender to touch  Neuro:  eyes open with voice, intermittently has simple verbal output(curses when touched), not following commands right sided weakness UE 3/5, LE 2/5, left UE & LE spotaneous  Cards:  RRR  Respiratory:  no respiratory distress, LLL crackles   Abdomen:  soft  Extremities:  no edema, left arm w/ AVF w/ palpable thrill, tight knee tophi   Skin:  warm/dry

## 2022-06-02 NOTE — PROGRESS NOTE ADULT - ATTENDING COMMENTS
68 yo man with h/o HTN, DM, ESRD on HD, CAD s/p LULU to LAD, Afib on Pradaxa (CHADs2 vasc 7), diabetic neuropathy with chronic R LE venous stasis changes>left, prior endarterectomy L CEA in 2014, on ASA and Pradaxa at home (no Plavix), admitted 4/3 with worsening oliguria, proceeded to requiring HD this admission, continued to have poor mental status (speaking a few words, following only simple commands) with MRI 5/14 showing b/l watershed strokes, with L ICA occlusion and severe R ICA stenosis, for which he was started on Plavix in addition to ASA and Pradaxa in preparation for carotid stent. With hypotension, hypoxia and new R sided weakness and global aphasia on 5/30, with CT/CTAP stable. Repeat MRI 5/31 with increased watershed strokes.  Found to have an upper GI bleed from a duodenal ulcer s/p 4 clips, also noted to have another clean based duodenal ulcer and gastritis seen on EGD 6/1.     Received 2 pRBC overnight.  With 1 episodes of melena this AM.     On exam, lethargic, screams out, does not follows commands, EOMI, R arm AG, L arm AG, R leg withdraws 1-2/5, L leg 2/5    Assessment: from having multidisciplinary discussions with multiple services, patient is high risk for rebleeding if back on DAPT and AC. Will continue to discuss options for revascularization of the R carotid as patient had developed worsening watershed strokes across this month demonstrating symptomatic hypoperfusion.  I was personally present at the discussion Rosca who was at bedside with patient's wife and on the phone with her daughter. CEA and high risk of CEA were discussion as well as very high risk of recurrent strokes from hypoperfusion if no intervention is done. The family will think about the best option for the patient.     q1h NC  VEEG for waxing waning mental status   f/u with family and vascular surgery re CEA  c/w ASA and low dose Apixaban 2.5 mg BID, off Plavix since no plan for stent right now   c/w atorvastatin  SBP strictly 120-180, off onesimo   increase metoprolol to 25 mg q6h  PPI ggt for melena and Carafate, GI following  NPO for GI bleed, f/u with GI re tube feeds   transfuse for Hg>8    Patient is critically ill due to symptomatic carotid stenosis and Upper GI hemorrhage and at high risk for neurological deterioration or death due to: symptomatic carotid stenosis at high risk for strokes and Upper GI hemorrhage requiring pRBC transfusions.

## 2022-06-02 NOTE — SWALLOW BEDSIDE ASSESSMENT ADULT - SLP PERTINENT HISTORY OF CURRENT PROBLEM
hx con't Pt admitted for decreased uring output and leg edema, hospital course c/b need for emergent HD, acute strokes (per MRI 5/12) and new R ICA occlusion, and overall persistent AMS (pt declining medication and with periods of agitation), hx con't Pt admitted for decreased urine output and leg edema, hospital course c/b need for emergent HD, acute strokes (per MRI 5/12 and 5/30) and new R ICA occlusion, acute uremia,, UGIB, hypoperfusion episode, persistent encephalopathy with worsening mental status, and now transferred from PICU to NSCU.4/3 s/p emergent L femoral shiley for emergent HD.  4/12 shliey removed; IJ HD placed. 4/17 s/p AVF. 4/20 sp permacath. 5/10 Neuro Impressions: Persistent encephalopathy. MRI brain revealed small acute infarctions in bilateral posterior centrum semiovale and left corona radiata and left posterior periventricular white matter. Carotid US showing left internal carotid artery stenosis involving the distal right common carotid artery, and mild to moderate flow-limiting stenosis is seen at the left external carotid artery. Recs for vascular surgery consultation for assessment for CEA/carotid stenting.

## 2022-06-02 NOTE — PROGRESS NOTE ADULT - SUBJECTIVE AND OBJECTIVE BOX
Follow-up Pulm Progress Note    No new respiratory events overnight  Sats 96% RA  pt lethargic     Medications:  MEDICATIONS  (STANDING):  allopurinol 100 milliGRAM(s) Oral daily  apixaban 2.5 milliGRAM(s) Oral two times a day  aspirin  chewable 81 milliGRAM(s) Oral daily  atorvastatin 40 milliGRAM(s) Oral at bedtime  chlorhexidine 4% Liquid 1 Application(s) Topical <User Schedule>  epoetin naz-epbx (RETACRIT) Injectable 92551 Unit(s) IV Push once  insulin lispro (ADMELOG) corrective regimen sliding scale   SubCutaneous every 6 hours  lidocaine   4% Patch 1 Patch Transdermal daily  metoprolol tartrate 25 milliGRAM(s) Oral every 6 hours  Nephro-reena 1 Tablet(s) Oral daily  ofloxacin 0.3% Solution 1 Drop(s) Right EYE four times a day  pantoprazole Infusion 8 mG/Hr (10 mL/Hr) IV Continuous <Continuous>  povidone iodine 10% Solution 1 Application(s) Topical daily  sucralfate 1 Gram(s) Oral every 12 hours        Vital Signs Last 24 Hrs  T(C): 37.2 (02 Jun 2022 07:00), Max: 37.2 (02 Jun 2022 07:00)  T(F): 98.9 (02 Jun 2022 07:00), Max: 98.9 (02 Jun 2022 07:00)  HR: 107 (02 Jun 2022 09:00) (81 - 134)  BP: 112/39 (01 Jun 2022 13:35) (112/39 - 149/51)  BP(mean): 59 (01 Jun 2022 13:35) (59 - 83)  RR: 22 (02 Jun 2022 09:00) (15 - 33)  SpO2: 98% (02 Jun 2022 09:00) (86% - 100%)          06-01 @ 07:01  -  06-02 @ 07:00  --------------------------------------------------------  IN: 908.7 mL / OUT: 0 mL / NET: 908.7 mL          LABS:                        8.4    13.07 )-----------( 253      ( 02 Jun 2022 05:27 )             27.6     06-01    137  |  99  |  40<H>  ----------------------------<  143<H>  4.8   |  23  |  5.46<H>    Ca    8.3<L>      01 Jun 2022 22:06  Phos  3.4     06-01  Mg     2.2     06-01            CAPILLARY BLOOD GLUCOSE      POCT Blood Glucose.: 130 mg/dL (02 Jun 2022 11:53)    PT/INR - ( 01 Jun 2022 05:11 )   PT: 25.7 sec;   INR: 2.20 ratio         PTT - ( 01 Jun 2022 05:11 )  PTT:32.5 sec    Procalcitonin, Serum: 0.61 ng/mL (05-31-22 @ 02:28)    Serum Pro-Brain Natriuretic Peptide: 9179 pg/mL (05-31-22 @ 02:28)                CULTURES:     Culture - Blood (collected 05-31-22 @ 02:33)  Source: .Blood Blood  Preliminary Report (06-01-22 @ 09:01):    No growth to date.    Culture - Blood (collected 05-31-22 @ 02:33)  Source: .Blood Blood  Preliminary Report (06-01-22 @ 09:01):    No growth to date.            Physical Examination:  PULM: decreased BS  CVS: S1, S2 heard    RADIOLOGY REVIEWED  CXR 6/2: small L effusion/atelectasis  majority R lung off film     CT chest: < from: CT Chest No Cont (05.27.22 @ 18:46) >  FINDINGS:    LUNGS/PLEURA/AIRWAYS: Patent central airways.  Moderate left pleural   effusion, increased from prior exam, associated with near complete   atelectasis of the left lower lobe. Small right pleural effusion with   adjacent subsegmental atelectasis. Right-sided pleural thickening not   significantly changed from prior exam. Bilateral calcified granulomas.  MEDIASTINUM AND STEPHON: No large mediastinal lymph nodes.  VESSELS: Atherosclerotic calcification of the aorta and its branches.   Right IJ central venous catheter with tip terminating at the superior   cavoatrial junction.  HEART: Cardiomegaly. No pericardial effusion.  CHEST WALL AND LOWER NECK: Within normal limits.  VISUALIZED UPPER ABDOMEN: Unchanged nonspecific bilateral perinephric   stranding.  BONES: Degenerative changes of the spine.    IMPRESSION:  Interval increase in left pleural effusion with near complete atelectasis   of the left lower lobe. Stable small right pleural effusion and pleural   thickening.      < end of copied text >

## 2022-06-02 NOTE — PROGRESS NOTE ADULT - SUBJECTIVE AND OBJECTIVE BOX
CARDIOLOGY FOLLOW UP - Dr. Mazariegos  DATE OF SERVICE: 6/2/22    CC confuse on exam - wife at bedside     sp egd yesterday Grade C esophagitis, non bleeding erosive gastropathy, duodenitis, Non obstructing bleeding duodenal ulcer (2nd portion, 10mm in largest diameter) with active bleeding - endoclip x4 placed with control of bleeding, additional 6mm non bleeding clean based duodenal ulcer (2nd portion)  -on ppi gtt, sp PRBC     REVIEW OF SYSTEMS:  bo given mental status      PHYSICAL EXAM:  T(C): 37.2 (06-02-22 @ 07:00), Max: 37.2 (06-02-22 @ 07:00)  HR: 107 (06-02-22 @ 09:00) (81 - 134)  BP: 112/39 (06-01-22 @ 13:35) (112/39 - 149/51)  RR: 22 (06-02-22 @ 09:00) (15 - 33)  SpO2: 98% (06-02-22 @ 09:00) (86% - 100%)  Wt(kg): --  I&O's Summary    01 Jun 2022 07:01  -  02 Jun 2022 07:00  --------------------------------------------------------  IN: 908.7 mL / OUT: 0 mL / NET: 908.7 mL        Appearance: ill appearing, confuse 	  Cardiovascular: Normal S1 S2, irreg   Respiratory: diminished   Gastrointestinal:  Soft, Non-tender, + BS	+ ng tube   Extremities: edema+       Home Medications:  allopurinol 100 mg oral tablet: 1 tab(s) orally once a day (03 Apr 2022 06:34)  aspirin 81 mg oral delayed release tablet: 1 tab(s) orally once a day (03 Apr 2022 06:34)  bumetanide 2 mg oral tablet: 1 tab(s) orally once a day (03 Apr 2022 06:34)  insulin glargine 100 units/mL subcutaneous solution: 25 unit(s) subcutaneous once a day (at bedtime) (03 Apr 2022 06:34)  metOLazone 5 mg oral tablet: 1 tab(s) orally 3 times a week (03 Apr 2022 06:34)  metoprolol succinate 50 mg oral tablet, extended release: 2 tab(s) orally once a day (03 Apr 2022 06:34)  NovoLOG 100 units/mL subcutaneous solution: subcutaneous 4 times a day (before meals and at bedtime) (03 Apr 2022 06:34)  NovoLOG FlexPen 100 units/mL injectable solution: 10 unit(s) subcutaneous 3 times a day (03 Apr 2022 06:34)  oxycodone-acetaminophen 5 mg-325 mg oral tablet: 1 tab(s) orally 2 times a day (03 Apr 2022 06:34)  Pradaxa 150 mg oral capsule: 1 cap(s) orally 2 times a day (03 Apr 2022 06:34)  pregabalin 100 mg oral capsule: 1 cap(s) orally 3 times a day (03 Apr 2022 06:34)      MEDICATIONS  (STANDING):  allopurinol 100 milliGRAM(s) Oral daily  apixaban 2.5 milliGRAM(s) Oral two times a day  aspirin  chewable 81 milliGRAM(s) Oral daily  atorvastatin 40 milliGRAM(s) Oral at bedtime  chlorhexidine 4% Liquid 1 Application(s) Topical <User Schedule>  epoetin naz-epbx (RETACRIT) Injectable 23059 Unit(s) IV Push once  insulin lispro (ADMELOG) corrective regimen sliding scale   SubCutaneous every 6 hours  lidocaine   4% Patch 1 Patch Transdermal daily  metoprolol tartrate 25 milliGRAM(s) Oral every 6 hours  Nephro-reena 1 Tablet(s) Oral daily  pantoprazole Infusion 8 mG/Hr (10 mL/Hr) IV Continuous <Continuous>  povidone iodine 10% Solution 1 Application(s) Topical daily  sucralfate 1 Gram(s) Oral every 12 hours      TELEMETRY: afib hr 90- 100s	    ECG:  	  RADIOLOGY:   DIAGNOSTIC TESTING:  [ ] Echocardiogram:  [ ]  Catheterization:  [ ] Stress Test:    OTHER: 	    LABS:	 	    Troponin T, High Sensitivity Result: 226 ng/L [0 - 51] (05-31 @ 02:28)                          8.4    13.07 )-----------( 253      ( 02 Jun 2022 05:27 )             27.6     06-01    137  |  99  |  40<H>  ----------------------------<  143<H>  4.8   |  23  |  5.46<H>    Ca    8.3<L>      01 Jun 2022 22:06  Phos  3.4     06-01  Mg     2.2     06-01      PT/INR - ( 01 Jun 2022 05:11 )   PT: 25.7 sec;   INR: 2.20 ratio         PTT - ( 01 Jun 2022 05:11 )  PTT:32.5 sec         CARDIOLOGY FOLLOW UP - Dr. Mazariegos  DATE OF SERVICE: 6/2/22    CC confused on exam - wife at bedside     sp egd yesterday Grade C esophagitis, non bleeding erosive gastropathy, duodenitis, Non obstructing bleeding duodenal ulcer (2nd portion, 10mm in largest diameter) with active bleeding - endoclip x4 placed with control of bleeding, additional 6mm non bleeding clean based duodenal ulcer (2nd portion)  -on ppi gtt, sp PRBC     REVIEW OF SYSTEMS:  bo given mental status      PHYSICAL EXAM:  T(C): 37.2 (06-02-22 @ 07:00), Max: 37.2 (06-02-22 @ 07:00)  HR: 107 (06-02-22 @ 09:00) (81 - 134)  BP: 112/39 (06-01-22 @ 13:35) (112/39 - 149/51)  RR: 22 (06-02-22 @ 09:00) (15 - 33)  SpO2: 98% (06-02-22 @ 09:00) (86% - 100%)  Wt(kg): --  I&O's Summary    01 Jun 2022 07:01  -  02 Jun 2022 07:00  --------------------------------------------------------  IN: 908.7 mL / OUT: 0 mL / NET: 908.7 mL        Appearance: ill appearing, confuse 	  Cardiovascular: Normal S1 S2, irreg   Respiratory: diminished   Gastrointestinal:  Soft, Non-tender, + BS	+ ng tube   Extremities: edema+       Home Medications:  allopurinol 100 mg oral tablet: 1 tab(s) orally once a day (03 Apr 2022 06:34)  aspirin 81 mg oral delayed release tablet: 1 tab(s) orally once a day (03 Apr 2022 06:34)  bumetanide 2 mg oral tablet: 1 tab(s) orally once a day (03 Apr 2022 06:34)  insulin glargine 100 units/mL subcutaneous solution: 25 unit(s) subcutaneous once a day (at bedtime) (03 Apr 2022 06:34)  metOLazone 5 mg oral tablet: 1 tab(s) orally 3 times a week (03 Apr 2022 06:34)  metoprolol succinate 50 mg oral tablet, extended release: 2 tab(s) orally once a day (03 Apr 2022 06:34)  NovoLOG 100 units/mL subcutaneous solution: subcutaneous 4 times a day (before meals and at bedtime) (03 Apr 2022 06:34)  NovoLOG FlexPen 100 units/mL injectable solution: 10 unit(s) subcutaneous 3 times a day (03 Apr 2022 06:34)  oxycodone-acetaminophen 5 mg-325 mg oral tablet: 1 tab(s) orally 2 times a day (03 Apr 2022 06:34)  Pradaxa 150 mg oral capsule: 1 cap(s) orally 2 times a day (03 Apr 2022 06:34)  pregabalin 100 mg oral capsule: 1 cap(s) orally 3 times a day (03 Apr 2022 06:34)      MEDICATIONS  (STANDING):  allopurinol 100 milliGRAM(s) Oral daily  apixaban 2.5 milliGRAM(s) Oral two times a day  aspirin  chewable 81 milliGRAM(s) Oral daily  atorvastatin 40 milliGRAM(s) Oral at bedtime  chlorhexidine 4% Liquid 1 Application(s) Topical <User Schedule>  epoetin naz-epbx (RETACRIT) Injectable 23689 Unit(s) IV Push once  insulin lispro (ADMELOG) corrective regimen sliding scale   SubCutaneous every 6 hours  lidocaine   4% Patch 1 Patch Transdermal daily  metoprolol tartrate 25 milliGRAM(s) Oral every 6 hours  Nephro-reena 1 Tablet(s) Oral daily  pantoprazole Infusion 8 mG/Hr (10 mL/Hr) IV Continuous <Continuous>  povidone iodine 10% Solution 1 Application(s) Topical daily  sucralfate 1 Gram(s) Oral every 12 hours      TELEMETRY: afib hr 90- 100s	    ECG:  	  RADIOLOGY:   DIAGNOSTIC TESTING:  [ ] Echocardiogram:  [ ]  Catheterization:  [ ] Stress Test:    OTHER: 	    LABS:	 	    Troponin T, High Sensitivity Result: 226 ng/L [0 - 51] (05-31 @ 02:28)                          8.4    13.07 )-----------( 253      ( 02 Jun 2022 05:27 )             27.6     06-01    137  |  99  |  40<H>  ----------------------------<  143<H>  4.8   |  23  |  5.46<H>    Ca    8.3<L>      01 Jun 2022 22:06  Phos  3.4     06-01  Mg     2.2     06-01      PT/INR - ( 01 Jun 2022 05:11 )   PT: 25.7 sec;   INR: 2.20 ratio         PTT - ( 01 Jun 2022 05:11 )  PTT:32.5 sec

## 2022-06-02 NOTE — PROGRESS NOTE ADULT - ASSESSMENT
ASSESSMENT/PLAN:    NEURO:  Neuro Checks Q 1 hr  neuro IR following  ASA 81 mg OD  Eliquis 2.5 mg OD  Angio and stenting VS CEA    PULM:   Wean off nasal canula     CV:   Afib, CAD  SBP goal 120-180  Atorvastatin 40 mg OD  keep metoprolol 12.5mg bid with hold measures   Phenylephrine gtt  Eliquis 2.5 mg BID   trops, BNP    RENAL:  Fluids: IVL   ESRD on HD, Last day yesterday     GI:  Diet: NPO  NGT  Melena  Upper GI endoscopy today    Pantoprazole 40 mg BID     Bowel regimen [] colace [x] senna [x] other: miralax    ENDO:   Goal euglycemia (-180)    HEME/ONC:  Hgb drop  PRBC transfusion  VTE prophylaxis: [x] SCDs     ID:   Monitor for fever     SOCIAL/FAMILY:  [] awaiting [x] updated regarding transfer [] family meeting    CODE STATUS:  [x] Full Code [] DNR [] DNI [] Palliative/Comfort Care    DISPOSITION:  [x] ICU [] Stroke Unit [] Floor [] EMU [] RCU [] PCU    [x] Patient is at high risk of neurologic deterioration/death due to: sepsis due to unknown organism, hypotension, cerebral hypoperfusion, acute stroke, altered mental status, respiratory failure requiring intubation     Contact: [phone]   ASSESSMENT/PLAN:    NEURO:  Neuro Checks Q 1 hr  ASA 81 mg OD  Eliquis 2.5 mg OD  Not candidate for endovascular treatment   CEA    PULM:   RA    CV:   Afib, CAD  SBP goal 120-180  Atorvastatin 40 mg OD  Metoprolol 12.5mg bid with hold measures   Phenylephrine gtt  Eliquis 2.5 mg BID   trops, BNP    RENAL:  Fluids: IVL   ESRD on HD  HD today     GI:  NGT, tube feeding  Melena  Gastritis, PUD post endoscopic clipping 06/01/2022  Consider angio and embolization in case of rebleeding   Pantoprazole gtt  Bowel regimen [] colace [x] senna [x] other: miralax    ENDO:   Goal euglycemia (-180)  ISS    HEME/ONC:  Monitor HH  VTE prophylaxis: [x] SCDs   Eliquis 2.5 mg BID    ID:   Monitor for fever     SOCIAL/FAMILY:  [] awaiting [x] updated regarding transfer [] family meeting    CODE STATUS:  [x] Full Code [] DNR [] DNI [] Palliative/Comfort Care    DISPOSITION:  [x] ICU [] Stroke Unit [] Floor [] EMU [] RCU [] PCU    [x] Patient is at high risk of neurologic deterioration/death due to: sepsis due to unknown organism, hypotension, cerebral hypoperfusion, acute stroke, altered mental status, respiratory failure requiring intubation     Contact: [phone]

## 2022-06-02 NOTE — SWALLOW BEDSIDE ASSESSMENT ADULT - COMMENTS
4/3 s/p emergent L femoral shiley for emergent HD.    4/12 shliey removed; IJ HD placed  4/17 s/p AVF  4/20 sp permacath   5/4 tachycardic, stabilized (pt  refusing medications prior to this)  5/4 bradycardic (pt refusing medications prior to this)  5/10 Neuro Impressions: Persistent encephalopathy. MRI brain revealed small acute infarctions in bilateral posterior centrum semiovale and left corona radiata and left posterior periventricular white matter. Carotid US showing left internal carotid artery stenosis involving the distal right common carotid artery, and mild to moderate flow-limiting stenosis is seen at the left external carotid artery. Recss for vascular surgery consultation for assessment for CEA/carotid stenting.   5/11: Vascular surgery: pt with new R ICA occlusion with recs for outpt f/u to reeval carotid patency with repeat CTA/MRA given sub-optimal visualization here given reduced pt cooperation.  5/23 Order received for bedside swallowing evaluation with reports of pt coughing with meals.    ID> UTI and leukocytosis resolved. CXR w/o evidence of focal consolidation and pt w/o respiratory symptoms. signed off.    Pulm > consulted for SOB, now resolved, Pt with hx of hydroPTX. CT chest in 2/2022 with pleural thickening, atelectasis. Findings at the time suspected to be chronic.  CXR 5/12 with small L effusion.   5/30 stroke code activated for altered mental status. MRI brain shows new small, scattered infarcts, more in left hemisphere.  5/31 angio  6/1 episode of melena concerning for UGIB. EGD completed with DU clipping. 1x transfusion warranted. GI also reports grade C esophagitis..    pt seen for swallow evaluation on 5/24 with recommendations for regular/thin with no noted overt s/s of aspiration. 4/3 s/p emergent L femoral shiley for emergent HD.    4/12 shliey removed; IJ HD placed  4/17 s/p AVF  4/20 sp permacath   5/4 tachycardic, stabilized (pt  refusing medications prior to this)  5/4 bradycardic (pt refusing medications prior to this)  5/10 Neuro Impressions: Persistent encephalopathy. MRI brain revealed small acute infarctions in bilateral posterior centrum semiovale and left corona radiata and left posterior periventricular white matter. Carotid US showing left internal carotid artery stenosis involving the distal right common carotid artery, and mild to moderate flow-limiting stenosis is seen at the left external carotid artery. Recss for vascular surgery consultation for assessment for CEA/carotid stenting.   5/11: Vascular surgery: pt with new R ICA occlusion with recs for outpt f/u to reeval carotid patency with repeat CTA/MRA given sub-optimal visualization here given reduced pt cooperation.  5/23 Order received for bedside swallowing evaluation with reports of pt coughing with meals.    ID> UTI and leukocytosis resolved. CXR w/o evidence of focal consolidation and pt w/o respiratory symptoms. signed off.    Pulm >Pt with hx of hydroPTX. CT chest in 2/2022 with pleural thickening, atelectasis. Findings at the time suspected to be chronic.   -CT chest 4/4 with small b/l pl effusion R>L, bibasilar atelectasis-Appeared mildly labored last week. CT chest 5/27 with no PNA, but with increase in L pl effusion. No longer with labored breathing, no plans for thoracentesis. Suggest optimization of fluid removal with HD.   5/30 stroke code activated for altered mental status. MRI brain shows new small, scattered infarcts, more in left hemisphere.  5/31 angio  6/1 episode of melena concerning for UGIB. EGD completed with DU clipping. 1x transfusion warranted. GI also reports grade C esophagitis..    pt seen for swallow evaluation on 5/24 with recommendations for regular/thin with no noted overt s/s of aspiration. hospital course con't  5/11: Vascular surgery: pt with new R ICA occlusion with recs for outpt f/u to reeval carotid patency with repeat CTA/MRA given sub-optimal visualization here.  5/23 Order received for bedside swallowing evaluation with reports of pt coughing with meals. Swallow eval 5/24 with recommendations for regular/thin with no  overt s/s of aspiration.  5/30 stroke code activated for altered mental status. MRI brain shows new small, scattered infarcts, more in left hemisphere. transferred to NSCU.  5/31 s/p angio  6/1 episode of melena concerning for UGIB. GI completed EGD with duodenal ulcer clipping. 1x transfusion warranted given acute GI bloss loss anemia. pt with Grade C esophagitis, non bleeding erosive gastropathy, duodenitis. con't PPI and carafate.    Pulm >Pt with hx of hydroPTX. CT chest in 2/2022 with pleural thickening, atelectasis. Findings at the time suspected to be chronic.   -CT chest 4/4 with small b/l pl effusion R>L, bibasilar atelectasis-Appeared mildly labored last week. CT chest 5/27 with no PNA, but with increase in L pl effusion. No longer with labored breathing, no plans for thoracentesis. Suggest optimization of fluid removal with HD.     Neuro 6/2> more lethargic today, not attending to questions or directives, yelling. NIH20. vEEG (-) for seizures; pt continues to hae multifactorial delirium, worsening in last 24 hours, need for HD. Recs for CEA vs stent given high grade R stenosis and high risk of recurrent stroke. consult vascular sx and neurointervention for risk of CEA vs stent.    Card> carotid stenosis, waiting for carotid angio, likely planning CEA. hospital course con't  5/11: Vascular surgery: pt with new R ICA occlusion with recs for outpt f/u to reeval carotid patency with repeat CTA/MRA given sub-optimal visualization here.  5/23 Order received for bedside swallowing evaluation with reports of pt coughing with meals. Swallow eval 5/24 with recommendations for regular/thin with no  overt s/s of aspiration.  5/30 stroke code activated for altered mental status. MRI brain shows new small, scattered infarcts, more in left hemisphere. transferred to NSCU.  5/31 s/p angio  6/1 episode of melena concerning for UGIB. GI completed EGD with duodenal ulcer clipping. 1x transfusion warranted given acute GI bloss loss anemia. pt with Grade C esophagitis, non bleeding erosive gastropathy, duodenitis. con't PPI and carafate.  6/2 pt NPO since 5/30, today NGT placed and feeds started  Pulm >Pt with hx of hydroPTX. CT chest in 2/2022 with pleural thickening, atelectasis. Findings at the time suspected to be chronic.   -CT chest 4/4 with small b/l pl effusion R>L, bibasilar atelectasis-Appeared mildly labored last week. CT chest 5/27 with no PNA, but with increase in L pl effusion. No longer with labored breathing, no plans for thoracentesis. Suggest optimization of fluid removal with HD.     Neuro 6/2> more lethargic today, not attending to questions or directives, yelling. NIH20. vEEG (-) for seizures; pt continues to hae multifactorial delirium, worsening in last 24 hours, need for HD. Recs for CEA vs stent given high grade R stenosis and high risk of recurrent stroke. consult vascular sx and neurointervention for risk of CEA vs stent.    Card 6/2> carotid stenosis, waiting for carotid angio, likely planning CEA.

## 2022-06-02 NOTE — PROGRESS NOTE ADULT - ASSESSMENT
ASSESSMENT: 67M hx CAD s/p stent, afib, HTN, T2DM, CKD now requiring dialysis, L CEA now found to have R ICA stenosis(>70%) started on triple therapy c/b GIB requiring clipping of bleeding ulcer      PLAN:  neurochecks q2, c/w Eliquis & aspirin, plavix on hold.  c/w HD per nephrology   afib - rate controlled on metoprolol 25mgq6,   h/h stable   NG tube in place,  opthalmology c/s evaluated pt, confirmed suspicion of HSV keratitis, will provide recommended eye drops per opthalmo   Feeding: nephrorth   c/w ofloxacin for right eye conjunctivitis   Seizure prophylaxis: [x] not indicated  Thromboprophylaxis: [x] SCDs [] hold chemoprophylaxis due to: GIB  Head of Bed/Activity: [x] 30 degrees [] mobilize as tolerated [] PT [] OT [] PMNR  Ulcer prophylaxis: [] not indicated [x] protonix ggt  Glucose Control: Goal -180     at risk for deterioration due to UGIB, acute blood loss anemia, acute stroke, cerebral hypoperfusion  ICU  full code

## 2022-06-02 NOTE — PROGRESS NOTE ADULT - PROBLEM SELECTOR PLAN 2
I Edward Pinto MD have personally  seen and examined the patient today and have noted the findings and formulated the plan of care.  I Edward Pinto MD have personally seen and examined the patient at bedside today at 1pm

## 2022-06-02 NOTE — PROGRESS NOTE ADULT - ASSESSMENT
ESRD.co hemodialysis per renal  to cont as per renal    s/p CVA / acute infarcts  - Neurology follow up    Chronic atrial fibrillation. c/w a/c  cards f/u     GI bleed  - PPI  - GI follow up   egd noted  duodenal ulcers  esophagitis  a/c to cont as per gi/ nsx    Cystitis.   resolved    Type 2 diabetes mellitus. c/w insulin   endo f/u     CAD (coronary artery disease).   rapid a fib better  meds adjusted  hr stable  c/w a/c     carotid duplex with stenosis   cta noted  mra/ mri noted  tx plans as per/ neuro sx   neuro f/u noted      pl effusion   pulm f/u    NGT feed     DVT prophylaxis    NSCU care

## 2022-06-02 NOTE — PROGRESS NOTE ADULT - SUBJECTIVE AND OBJECTIVE BOX
Patient is a 67y old  Male who presents with a chief complaint of Decreased urine output for the past week, leg oedema (02 Jun 2022 12:20)      Vascular Surgery Attending Progress Note    Interval HPI: pt appears lethargic     Medications:  allopurinol 100 milliGRAM(s) Oral daily  apixaban 2.5 milliGRAM(s) Oral two times a day  aspirin  chewable 81 milliGRAM(s) Oral daily  atorvastatin 40 milliGRAM(s) Oral at bedtime  chlorhexidine 4% Liquid 1 Application(s) Topical <User Schedule>  epoetin naz-epbx (RETACRIT) Injectable 98974 Unit(s) IV Push once  insulin lispro (ADMELOG) corrective regimen sliding scale   SubCutaneous every 6 hours  lidocaine   4% Patch 1 Patch Transdermal daily  metoprolol tartrate 25 milliGRAM(s) Oral every 6 hours  Nephro-reena 1 Tablet(s) Oral daily  pantoprazole Infusion 8 mG/Hr IV Continuous <Continuous>  povidone iodine 10% Solution 1 Application(s) Topical daily  sucralfate 1 Gram(s) Oral every 12 hours      Vital Signs Last 24 Hrs  T(C): 36.9 (02 Jun 2022 15:00), Max: 37.2 (02 Jun 2022 07:00)  T(F): 98.5 (02 Jun 2022 15:00), Max: 98.9 (02 Jun 2022 07:00)  HR: 99 (02 Jun 2022 15:00) (81 - 130)  BP: --  BP(mean): --  RR: 20 (02 Jun 2022 15:00) (16 - 26)  SpO2: 99% (02 Jun 2022 15:00) (91% - 100%)  I&O's Summary    01 Jun 2022 07:01  -  02 Jun 2022 07:00  --------------------------------------------------------  IN: 908.7 mL / OUT: 0 mL / NET: 908.7 mL    02 Jun 2022 07:01  -  02 Jun 2022 15:13  --------------------------------------------------------  IN: 190 mL / OUT: 0 mL / NET: 190 mL        Physical Exam:  Neuro  A&Ox1 VSS  Vascular:  stable     LABS:                        8.4    13.07 )-----------( 253      ( 02 Jun 2022 05:27 )             27.6     06-01    137  |  99  |  40<H>  ----------------------------<  143<H>  4.8   |  23  |  5.46<H>    Ca    8.3<L>      01 Jun 2022 22:06  Phos  3.4     06-01  Mg     2.2     06-01      PT/INR - ( 01 Jun 2022 05:11 )   PT: 25.7 sec;   INR: 2.20 ratio         PTT - ( 01 Jun 2022 05:11 )  PTT:32.5 sec    ct and cta of head and neck reviewed     UMAIR MCGRAW MD  [phone] Cell [phone]

## 2022-06-02 NOTE — PROGRESS NOTE ADULT - PROBLEM SELECTOR PLAN 3
MR head with multiple small acute infarctions  -Repeat TTE with no changes   -+carotid stenosis   -Neuro f/u

## 2022-06-02 NOTE — EEG REPORT - NS EEG TEXT BOX
[facility]   COMPREHENSIVE EPILEPSY CENTER   REPORT OF LONG-TERM VIDEO EEG     [facility]: [address], [place] [address], Ph#: [phone]  [facility]: [id number] [place] [address], Ph#: [phone]  [facility]: [address] [place] [address], Ph#: [phone]    Patient Name: DYLAN DEL CASTILLO  Age and : 67y ([birth year])  MRN #: [id number]  Location: [place] [address]  Referring Physician: Danielle Nolan    Start Time/Date: 10:53 on 2022  End Time/Date: 08:00 on 2022  Duration: 20:09  _____________________________________________________________  STUDY INFORMATION    EEG Recording Technique:  The patient underwent continuous Video-EEG monitoring, using Telemetry System hardware on the XLTek Digital System. EEG and video data were stored on a computer hard drive with important events saved in digital archive files. The material was reviewed by a physician (electroencephalographer / epileptologist) on a daily basis. Jimi and seizure detection algorithms were utilized and reviewed. An EEG Technician attended to the patient, and was available throughout daytime work hours.  The epilepsy center neurologist was available in person or on call 24-hours per day.    EEG Placement and Labeling of Electrodes:  The EEG was performed utilizing 20 channel referential EEG connections (coronal over temporal over parasagittal montage) using all standard 10-20 electrode placements with EKG, with additional electrodes placed in the inferior temporal region using the modified 10-10 montage electrode placements for elective admissions, or if deemed necessary. Recording was at a sampling rate of 256 samples per second per channel. Time synchronized digital video recording was done simultaneously with EEG recording. A low light infrared camera was used for low light recording.   _____________________________________________________________  HISTORY    Patient is a 67y old  Male who presents with a chief complaint of Decreased urine output for the past week, leg oedema (2022 09:59)    PERTINENT MEDICATION:  none  _____________________________________________________________  STUDY INTERPRETATION    Findings: The background was continuous, spontaneously variable and reactive. During wakefulness, No posterior dominant rhythm seen.    Background Slowing:  Diffuse theta and polymorphic delta slowing.    Focal Slowing:   Near continuous delta slowing over the left temproal region.    Sleep Background:  Drowsiness was characterized by fragmentation, attenuation, and slowing of the background activity.    Sleep was characterized by the presence of vertex waves, symmetric sleep spindles and K-complexes.    Other Non-Epileptiform Findings:  None were present.    Interictal Epileptiform Activity:   None were present.    Events:  Clinical events: None recorded.  Seizures: None recorded.    Activation Procedures:   Hyperventilation was not performed.    Photic stimulation was not performed.     Artifacts:  Intermittent myogenic and movement artifacts were noted.    ECG:  The heart rate on single channel ECG was predominantly between  BPM.    _____________________________________________________________  EEG SUMMARY/CLASSIFICATION    Abnormal EEG in an encepalopathic patient.  - Near continuous delta slowing over the left temporal region  - Moderate generalized slowing.    _____________________________________________________________  EEG IMPRESSION/CLINICAL CORRELATE    Abnormal EEG study.  Structural abnormality in the left temporal region  Moderate nonspecific diffuse or multifocal cerebral dysfunction.   No epileptiform pattern or seizure seen.    Artem Strickland MD PGY-5  Epilepsy Fellow    This Preliminary report is based on fellow review. Final report pending attending review.    Reading Room: [phone]  On Call Service After Hours: [phone] [facility]   COMPREHENSIVE EPILEPSY CENTER   REPORT OF LONG-TERM VIDEO EEG     [facility]: [address], [place] [address], Ph#: [phone]  [facility]: [id number] [facility] [place] [address], Ph#: [phone]  [facility]: [address] [place] [address], Ph#: [phone]    Patient Name: DYLAN DEL CASTILLO  Age and : 67y (57)  MRN #: [id number]  Location: [place] [address]  Referring Physician: Danielle Nolan    Start Time/Date: 10:53 on 2022  End Time/Date: 08:00 on 2022  Duration: 20:09  _____________________________________________________________  STUDY INFORMATION    EEG Recording Technique:  The patient underwent continuous Video-EEG monitoring, using Telemetry System hardware on the XLTek Digital System. EEG and video data were stored on a computer hard drive with important events saved in digital archive files. The material was reviewed by a physician (electroencephalographer / epileptologist) on a daily basis. Jimi and seizure detection algorithms were utilized and reviewed. An EEG Technician attended to the patient, and was available throughout daytime work hours.  The epilepsy center neurologist was available in person or on call 24-hours per day.    EEG Placement and Labeling of Electrodes:  The EEG was performed utilizing 20 channel referential EEG connections (coronal over temporal over parasagittal montage) using all standard 10-20 electrode placements with EKG, with additional electrodes placed in the inferior temporal region using the modified 10-10 montage electrode placements for elective admissions, or if deemed necessary. Recording was at a sampling rate of 256 samples per second per channel. Time synchronized digital video recording was done simultaneously with EEG recording. A low light infrared camera was used for low light recording.   _____________________________________________________________  HISTORY    Patient is a 67y old  Male who presents with a chief complaint of Decreased urine output for the past week, leg oedema (2022 09:59)    PERTINENT MEDICATION:  none  _____________________________________________________________  STUDY INTERPRETATION    Findings: The background was continuous, spontaneously variable and reactive. During wakefulness, No posterior dominant rhythm seen.    Background Slowing:  Diffuse theta and polymorphic delta slowing.    Focal Slowing:   Near continuous delta slowing over the left temproal region.    Sleep Background:  Drowsiness was characterized by fragmentation, attenuation, and slowing of the background activity.    Sleep was characterized by the presence of vertex waves, symmetric sleep spindles and K-complexes.    Other Non-Epileptiform Findings:  None were present.    Interictal Epileptiform Activity:   None were present.    Events:  Clinical events: None recorded.  Seizures: None recorded.    Activation Procedures:   Hyperventilation was not performed.    Photic stimulation was not performed.     Artifacts:  Intermittent myogenic and movement artifacts were noted.    ECG:  The heart rate on single channel ECG was predominantly between  BPM.    _____________________________________________________________  EEG SUMMARY/CLASSIFICATION    Abnormal EEG in an encepalopathic patient.  - Near continuous slowing over the left temporal region  - Moderate generalized slowing.    _____________________________________________________________  EEG IMPRESSION/CLINICAL CORRELATE    Abnormal EEG study.  Structural abnormality in the left temporal region  Moderate nonspecific diffuse or multifocal cerebral dysfunction.   No epileptiform pattern or seizure seen.    Artem Strickland MD PGY-5  Epilepsy Fellow      Reading Room: [phone]  On Call Service After Hours: [phone]

## 2022-06-02 NOTE — PROGRESS NOTE ADULT - ASSESSMENT
Impression:  This is a 67 year old gentleman pmhx CAD s/p MI/PCI/CABG, hx TIA, afib on rivaroxaban, HTN, DM2, PVD, gout, L CEA 2014, and CKD who presented to [facility] 4/11/22 with ANT, tremors, confusion and lethargy.  CT head no acute changes.  S/p initiation of hemodialysis.  Mental status had improved dramatically.  Pt denies any headaches, no slurred speech, no hemiparesis.  He has chronic gout with bilateral finger swelling, pain.  He also has chronic neuropathy.  Both legs are weak.      Persistent encephalopathy prompted re-consultation on 5/10/22.  MRI brain was ordered and revealed small acute infarctions in bilateral posterior centrum semiovale and left corona radiata and left posterior periventricular white matter.  Carotid ultrasound was performed showing new occlusion of the left internal carotid artery, along with greater than 70% stenosis involving the distal right common carotid artery, and mild to moderate flow-limiting stenosis is seen at the left external carotid artery.  Vascular surgery has been consulted and is recommending CTA.  He continues to be encephalopathic and lethargic.      5/30 stroke code activated for altered mental status.   MRI brain shows new small, scattered infarcts, more in left hemisphere.  Unclear if cause of encephalopathy   s/p DU clipping, needed transfusion on 6/1 cammie  v    Diagnosis and Recommendations:  1.  Multifactorial delirium - has been waxing and waning through his hospital course, now improving but not yet the best seen by this service  video EEG prelim negative for seizure  mental status has worsened in last 24 hours, pt will need HD    2. Complete left ICA occlusion and 70% distal CCA stenosis. Has collateral flow via acomm and pcomm but should be re-evaluated for either CEA or carotid stenting. CEA may be ideal considering issues with medication compliance and potential difficulty adhering to DAPT regimen but defer to proceduralists regarding this. On 5/25, Dr David discussed extensively with son at bedside and daughter via FaceTime: all options here have risk associated with them.  Opening the artery may or may not help.  However, it will reduce long-term stroke risk.  It might help with his mental status by helping brain perfusion, though this cannot be guaranteed.  There is risk associated with procedure.    Further questions regarding the procedure will need to be addressed by neurosurgery.          - MRA was suboptimal not visualizing the neck but MRA head showed left KESHAWN coming from ACOM and left PCA feeding left MCA.  ICA showed no flow.    - in setting of atrial fibrillation infarcts could potentially also be cardioembolic, continue anticoagulation as per cardiology  - s/p carotid doppler, suboptimal CTA/MRA.  However, it is apparent that there is a proximal left ICA occlusion, which would not be a candidate for intervention.  Intervention for right CCA/ICA stenosis would be high risk but it may indeed by a symptomatic stenosis.   - ECHO in February 2022 did not reveal severe cardiomyopathy, vegetation, or LV thrombus.    - HgA1c of 11 also driving stroke risk.  Goal is < 7  - continue high-intensity statin therapy    The issue is the high grade right stenosis and high risk of recurrent stroke, appreciate notes from neurointervention on risks of cea vs stent, states have discussed with vascular await their notes.      reviewed case with NSCU team and wife, that pt is critical and unstable, that pt is s/p ulcer clipping required transfusion, has had dramatic worsening of their mental state in last 24 hours, needs HD  d/w wife that prognosis is concerning at this time  total time spent 60 minutes

## 2022-06-02 NOTE — PROGRESS NOTE ADULT - NS ATTEND AMEND GEN_ALL_CORE FT
s/p upper GI bleed due to DU actively bleeding s/p endoclip placement x 4 , hgb stable post 1 unit PRBC    plan IV protonix drip          monitor h/h         if pt rebleeds recommend IR consult for possible embolization

## 2022-06-02 NOTE — PROGRESS NOTE ADULT - SUBJECTIVE AND OBJECTIVE BOX
[facility]   [phone]  RENAL FOLLOW UP NOTE  --------------------------------------------------------------------------------  Chief Complaint:    24 hour events/subjective:    patient seen and examined with Dr. Art gordon,     PAST HISTORY  --------------------------------------------------------------------------------  No significant changes to PMH, PSH, FHx, SHx, unless otherwise noted    ALLERGIES & MEDICATIONS  --------------------------------------------------------------------------------  Allergies    nitrofurantoin (Nephrotoxicity)    Intolerances      Standing Inpatient Medications  allopurinol 100 milliGRAM(s) Oral daily  apixaban 2.5 milliGRAM(s) Oral two times a day  aspirin  chewable 81 milliGRAM(s) Oral daily  atorvastatin 40 milliGRAM(s) Oral at bedtime  chlorhexidine 4% Liquid 1 Application(s) Topical <User Schedule>  epoetin naz-epbx (RETACRIT) Injectable 44868 Unit(s) IV Push once  insulin lispro (ADMELOG) corrective regimen sliding scale   SubCutaneous every 6 hours  lidocaine   4% Patch 1 Patch Transdermal daily  metoprolol tartrate 25 milliGRAM(s) Oral every 6 hours  Nephro-reena 1 Tablet(s) Oral daily  ofloxacin 0.3% Solution 1 Drop(s) Right EYE four times a day  pantoprazole Infusion 8 mG/Hr IV Continuous <Continuous>  povidone iodine 10% Solution 1 Application(s) Topical daily  sucralfate 1 Gram(s) Oral every 12 hours    PRN Inpatient Medications      REVIEW OF SYSTEMS  --------------------------------------------------------------------------------    patient is unable to give ROS    VITALS/PHYSICAL EXAM  --------------------------------------------------------------------------------  T(C): 37.2 (06-02-22 @ 07:00), Max: 37.2 (06-02-22 @ 07:00)  HR: 107 (06-02-22 @ 09:00) (81 - 134)  BP: 112/39 (06-01-22 @ 13:35) (112/39 - 149/51)  RR: 22 (06-02-22 @ 09:00) (15 - 33)  SpO2: 98% (06-02-22 @ 09:00) (86% - 100%)  Wt(kg): --  Height (cm): 180.3 (06-02-22 @ 04:46)  Weight (kg): 120.9 (06-02-22 @ 04:46)  BMI (kg/m2): 37.2 (06-02-22 @ 04:46)  BSA (m2): 2.38 (06-02-22 @ 04:46)      06-01-22 @ 07:01  -  06-02-22 @ 07:00  --------------------------------------------------------  IN: 908.7 mL / OUT: 0 mL / NET: 908.7 mL      Physical Exam:  	              Gen: lethargic, on EEG   	Pulm: Decreased breath sounds b/l bases.  	CV: No JVD. IRR, +arterial line  	Abd: +BS, soft, nontender, softly distended, obese, +NGT               Extremity no edema              Extremity LE: + hyperpigmented bilateral LE with no edema              Vascular: R IJ HD catheter, L arm AVF     LABS/STUDIES  --------------------------------------------------------------------------------              8.4    13.07 >-----------<  253      [06-02-22 @ 05:27]              27.6     137  |  99  |  40  ----------------------------<  143      [06-01-22 @ 22:06]  4.8   |  23  |  5.46        Ca     8.3     [06-01-22 @ 22:06]      Mg     2.2     [06-01-22 @ 22:06]      Phos  3.4     [06-01-22 @ 22:06]      PT/INR: PT 25.7 , INR 2.20       [06-01-22 @ 05:11]  PTT: 32.5       [06-01-22 @ 05:11]      Creatinine Trend:  SCr 5.46 [06-01 @ 22:06]  SCr 3.41 [05-31 @ 22:52]  SCr 6.99 [05-31 @ 02:28]  SCr 6.75 [05-30 @ 22:40]  SCr 5.05 [05-29 @ 16:28]                  Iron 21, TIBC 245, %sat 9      [05-17-22 @ 05:54]  Ferritin 120      [05-17-22 @ 05:54]  PTH -- (Ca 8.1)      [05-28-22 @ 13:06]   96  PTH -- (Ca 8.5)      [05-24-22 @ 14:22]   84  PTH -- (Ca 8.3)      [04-15-22 @ 12:18]   150  PTH -- (Ca --)      [04-03-22 @ 23:06]   97  HbA1c 11.7      [11-07-19 @ 09:14]  TSH 1.71      [05-12-22 @ 07:11]

## 2022-06-03 NOTE — SWALLOW BEDSIDE ASSESSMENT ADULT - ORAL PREPARATORY PHASE
+ Lingual thrusting. Pt with episodes of shouting "no" and pushing clinician away/Refuses to accept bolus into oral cavity/Reduced oral grading Refuses to accept bolus into oral cavity

## 2022-06-03 NOTE — PROGRESS NOTE ADULT - SUBJECTIVE AND OBJECTIVE BOX
[facility]   [phone]  RENAL FOLLOW UP NOTE  --------------------------------------------------------------------------------  Chief Complaint:    24 hour events/subjective:    patient seen and examined with Dr. Cordova  awake, sitting at edge of bed with PT  moaning.     PAST HISTORY  --------------------------------------------------------------------------------  No significant changes to PMH, PSH, FHx, SHx, unless otherwise noted    ALLERGIES & MEDICATIONS  --------------------------------------------------------------------------------  Allergies    nitrofurantoin (Nephrotoxicity)    Intolerances      Standing Inpatient Medications  allopurinol 100 milliGRAM(s) Oral daily  apixaban 5 milliGRAM(s) Oral two times a day  aspirin  chewable 81 milliGRAM(s) Oral daily  atorvastatin 40 milliGRAM(s) Oral at bedtime  chlorhexidine 4% Liquid 1 Application(s) Topical <User Schedule>  erythromycin   Ointment 1 Application(s) Right EYE at bedtime  influenza  Vaccine (HIGH DOSE) 0.7 milliLiter(s) IntraMuscular once  insulin lispro (ADMELOG) corrective regimen sliding scale   SubCutaneous every 6 hours  lidocaine   4% Patch 1 Patch Transdermal daily  metoprolol tartrate 25 milliGRAM(s) Oral every 6 hours  Nephro-reena 1 Tablet(s) Oral daily  ofloxacin 0.3% Solution 1 Drop(s) Right EYE four times a day  pantoprazole Infusion 8 mG/Hr IV Continuous <Continuous>  povidone iodine 10% Solution 1 Application(s) Topical daily  sucralfate 1 Gram(s) Oral every 12 hours  trifluridine 1% Solution 1 Drop(s) Right EYE every 3 hours    PRN Inpatient Medications  acetaminophen     Tablet .. 650 milliGRAM(s) Oral every 6 hours PRN      REVIEW OF SYSTEMS  --------------------------------------------------------------------------------    patient is unable to give ROS    VITALS/PHYSICAL EXAM  --------------------------------------------------------------------------------  T(C): 36.2 (06-03-22 @ 11:00), Max: 37.1 (06-02-22 @ 19:00)  HR: 100 (06-03-22 @ 12:00) (78 - 130)  BP: --  RR: 21 (06-03-22 @ 12:00) (15 - 33)  SpO2: 96% (06-03-22 @ 12:00) (95% - 100%)  Wt(kg): --  Height (cm): 180.3 (06-02-22 @ 04:46)  Weight (kg): 120.9 (06-02-22 @ 04:46)  BMI (kg/m2): 37.2 (06-02-22 @ 04:46)  BSA (m2): 2.38 (06-02-22 @ 04:46)      06-02-22 @ 07:01  -  06-03-22 @ 07:00  --------------------------------------------------------  IN: 780 mL / OUT: 500 mL / NET: 280 mL    06-03-22 @ 07:01  -  06-03-22 @ 12:17  --------------------------------------------------------  IN: 595 mL / OUT: 0 mL / NET: 595 mL      Physical Exam:  	              Gen: lethargic, on EEG   	Pulm: Decreased breath sounds b/l bases.  	CV: No JVD. IRR, +arterial line  	Abd: +BS, soft, nontender, softly distended, obese, +NGT               Extremity no edema              Extremity LE: + hyperpigmented bilateral LE with no edema              Vascular: R IJ HD catheter, L arm AVF     LABS/STUDIES  --------------------------------------------------------------------------------              8.7    14.03 >-----------<  234      [06-02-22 @ 22:44]              28.0     137  |  98  |  15  ----------------------------<  110      [06-02-22 @ 22:44]  3.6   |  28  |  3.01        Ca     8.3     [06-02-22 @ 22:44]      Mg     2.0     [06-02-22 @ 22:44]      Phos  2.4     [06-02-22 @ 22:44]            Creatinine Trend:  SCr 3.01 [06-02 @ 22:44]  SCr 5.46 [06-01 @ 22:06]  SCr 3.41 [05-31 @ 22:52]  SCr 6.99 [05-31 @ 02:28]  SCr 6.75 [05-30 @ 22:40]                  Iron 21, TIBC 245, %sat 9      [05-17-22 @ 05:54]  Ferritin 120      [05-17-22 @ 05:54]  PTH -- (Ca 8.1)      [06-02-22 @ 15:14]   75  PTH -- (Ca 8.1)      [05-28-22 @ 13:06]   96  PTH -- (Ca 8.5)      [05-24-22 @ 14:22]   84  PTH -- (Ca 8.3)      [04-15-22 @ 12:18]   150  PTH -- (Ca --)      [04-03-22 @ 23:06]   97  HbA1c 11.7      [11-07-19 @ 09:14]  TSH 1.71      [05-12-22 @ 07:11]

## 2022-06-03 NOTE — PROGRESS NOTE ADULT - SUBJECTIVE AND OBJECTIVE BOX
I saw and examined the patient  Wife at bedside  We discussed contra-indication for carotid stenting due to GI hemorrhage on dual antiplatelet therapy  I recommended they proceed with Carotid Stenting due to symptomatic hypoperfusion

## 2022-06-03 NOTE — PROGRESS NOTE ADULT - SUBJECTIVE AND OBJECTIVE BOX
INTERVAL HPI/OVERNIGHT EVENTS:  no transfusion requirement in >24 hours  1 BM yesterday - dark  tolerated low rate NGTF yesterday    more alert and responsive today - intermittently agitated (though improved)  no abdominal pain, nausea or vomiting  s/p HD yesterday    was on half dose AC since 5/31 (for AF)    MEDICATIONS  (STANDING):  allopurinol 100 milliGRAM(s) Oral daily  apixaban 5 milliGRAM(s) Oral two times a day  aspirin  chewable 81 milliGRAM(s) Oral daily  atorvastatin 40 milliGRAM(s) Oral at bedtime  chlorhexidine 4% Liquid 1 Application(s) Topical <User Schedule>  erythromycin   Ointment 1 Application(s) Right EYE at bedtime  influenza  Vaccine (HIGH DOSE) 0.7 milliLiter(s) IntraMuscular once  insulin lispro (ADMELOG) corrective regimen sliding scale   SubCutaneous every 6 hours  lidocaine   4% Patch 1 Patch Transdermal daily  metoprolol tartrate 25 milliGRAM(s) Oral every 6 hours  Nephro-reena 1 Tablet(s) Oral daily  ofloxacin 0.3% Solution 1 Drop(s) Right EYE four times a day  pantoprazole Infusion 8 mG/Hr (10 mL/Hr) IV Continuous <Continuous>  povidone iodine 10% Solution 1 Application(s) Topical daily  sucralfate 1 Gram(s) Oral every 12 hours  trifluridine 1% Solution 1 Drop(s) Right EYE every 3 hours    MEDICATIONS  (PRN):  acetaminophen     Tablet .. 650 milliGRAM(s) Oral every 6 hours PRN Temp greater or equal to 38C (100.4F), Mild Pain (1 - 3)      Allergies  nitrofurantoin (Nephrotoxicity)      Review of Systems:  limited, see HPI    Vital Signs Last 24 Hrs  T(C): 36.8 (03 Jun 2022 07:00), Max: 37.1 (02 Jun 2022 19:00)  T(F): 98.3 (03 Jun 2022 07:00), Max: 98.8 (02 Jun 2022 19:00)  HR: 115 (03 Jun 2022 10:00) (78 - 130)  BP: --  BP(mean): --  RR: 21 (03 Jun 2022 10:00) (15 - 33)  SpO2: 98% (03 Jun 2022 10:00) (95% - 100%)    PHYSICAL EXAM:  Constitutional: NAD awake, calm and  responsive  +NGT (kaofeed)  HEENT: PERRLA, EOMI  Neck:  No JVD  Respiratory: CTAB/L  Cardiovascular: S1 and S2, tachy  Gastrointestinal: BS+, soft, NT/ND, no scars  Extremities: No peripheral edema +chronic stasis changes b/l  +wrist restraints  Neurological: awake and responsive. moves all extremities. +Rt facial droop  : No Prado  Skin: No rashes +chronic stasis changes b/l LE      LABS:                        8.7    14.03 )-----------( 234      ( 02 Jun 2022 22:44 )             28.0   Hemoglobin: 8.4 g/dL (06.02.22 @ 05:27)   s/p 1 unit PRBCs transfused  Hemoglobin: 7.5 g/dL (06.01.22 @ 22:06)   Hemoglobin: 7.9 g/dL (06.01.22 @ 14:14)   Hemoglobin: 8.4 g/dL (06.01.22 @ 05:11)     06-02    137  |  98  |  15  ----------------------------<  110<H>  3.6   |  28  |  3.01<H>    Ca    8.3<L>      02 Jun 2022 22:44  Phos  2.4     06-02  Mg     2.0     06-02        RADIOLOGY & ADDITIONAL TESTS:

## 2022-06-03 NOTE — PROGRESS NOTE ADULT - SUBJECTIVE AND OBJECTIVE BOX
Patient is a 67y old  Male who presents with a chief complaint of Decreased urine output for the past week, leg oedema (03 Jun 2022 12:17)      Vascular Surgery Attending Progress Note    Interval HPI: pt w/o new c/o    Medications:  acetaminophen     Tablet .. 650 milliGRAM(s) Oral every 6 hours PRN  allopurinol 100 milliGRAM(s) Oral daily  apixaban 5 milliGRAM(s) Oral two times a day  aspirin  chewable 81 milliGRAM(s) Oral daily  atorvastatin 40 milliGRAM(s) Oral at bedtime  chlorhexidine 4% Liquid 1 Application(s) Topical <User Schedule>  erythromycin   Ointment 1 Application(s) Right EYE at bedtime  influenza  Vaccine (HIGH DOSE) 0.7 milliLiter(s) IntraMuscular once  insulin lispro (ADMELOG) corrective regimen sliding scale   SubCutaneous every 6 hours  lidocaine   4% Patch 1 Patch Transdermal daily  metoprolol tartrate 25 milliGRAM(s) Oral every 6 hours  Nephro-reena 1 Tablet(s) Oral daily  ofloxacin 0.3% Solution 1 Drop(s) Right EYE four times a day  pantoprazole Infusion 8 mG/Hr IV Continuous <Continuous>  povidone iodine 10% Solution 1 Application(s) Topical daily  sucralfate 1 Gram(s) Oral every 12 hours  trifluridine 1% Solution 1 Drop(s) Right EYE every 3 hours      Vital Signs Last 24 Hrs  T(C): 36.5 (03 Jun 2022 15:00), Max: 37.1 (02 Jun 2022 19:00)  T(F): 97.7 (03 Jun 2022 15:00), Max: 98.8 (02 Jun 2022 19:00)  HR: 124 (03 Jun 2022 17:00) (78 - 130)  BP: --  BP(mean): --  RR: 22 (03 Jun 2022 17:00) (15 - 33)  SpO2: 100% (03 Jun 2022 17:00) (88% - 100%)  I&O's Summary    02 Jun 2022 07:01  -  03 Jun 2022 07:00  --------------------------------------------------------  IN: 780 mL / OUT: 500 mL / NET: 280 mL    03 Jun 2022 07:01  -  03 Jun 2022 17:47  --------------------------------------------------------  IN: 985 mL / OUT: 0 mL / NET: 985 mL        Physical Exam:  Neuro  A&Ox2 VSS  Vascular:  stable     LABS:                        9.0    12.92 )-----------( 255      ( 03 Jun 2022 14:17 )             29.5     06-02    137  |  98  |  15  ----------------------------<  110<H>  3.6   |  28  |  3.01<H>    Ca    8.3<L>      02 Jun 2022 22:44  Phos  2.4     06-02  Mg     2.0     06-02          UMAIR MCGRAW MD  [id number] Cell [phone]

## 2022-06-03 NOTE — SWALLOW BEDSIDE ASSESSMENT ADULT - SWALLOW EVAL: VELAR ELEVATION
intact
unable to assess 2/2 poor pt participation/comprehension unable to assess 2/2 poor pt participation/comprehension

## 2022-06-03 NOTE — PROGRESS NOTE ADULT - PROBLEM SELECTOR PLAN 5
-Carotid duplex with greater than 70% stenosis involving the distal R common carotid artery  -MR head with multiple small acute infarct   -Vacular recs noted, plans for possible CEA

## 2022-06-03 NOTE — PROGRESS NOTE ADULT - ASSESSMENT
Echo 5/13/22:  1. Normal left ventricular internal dimensions and wall  thicknesses.  2. Endocardial visualization enhanced with intravenous  injection of Ultrasonic Enhancing Agent (Lumason). Grossly  borderline normal left ventricular systolic function; no  obvious segmental wall motion abnormality.  3. The right ventricle is not well visualized.  *** Compared with echocardiogram of 2/22/2022, no  significant changes noted.      A/P    68 y/o M with history of CAD, s/p multiple PCI, with most recent 2/22 PCI of LAD in setting of NSTEMI, on ASA only (pradaxa), chronic atrial fibrillation maintained on Pradaxa, essential HTN, type 2 DM previously on oral medications but on insulin, diabetic neuropathy with chronic RIGHT LE venous stasis changes>left, LEFT foot ulcer seen by podiatry, past endarterectomy LEFT CEA in 2014 presenting with 1 week of decreased urine output, cystitis with hematuria     #ANT on CKD, new HD  -oliguric renal failure in setting of UTI/cystitis  -New HD for uremia, renal failure  -s/p L AVG 4/18, s/p permacath placement 4/20  -renal f/ u    #AMS/Lethargy  -ams likely from pain meds, embolic cva  -repeat CT 4/27 unremarkable  -MRI 5/11 revealed small acute infarctions in bilateral posterior centrum semiovale and left corona radiata and left posterior periventricular white matter  -repeat echo with no interval changes  -sp RRT 5/3- repeat imaging noted - transferred to NSICU  -neuro following       #Acute on chronic HFpEF  -stable  -continue volume removal with HD  -c/w bb  -repeat echo with stable borderline lv fxn    #Chronic AF  -rates stable  -ccb on hold  -BB adjustment  per NSICU   -c/w eliquis 5 mg BID     #HTN  -stable  -c/w meds per NSICU    #CAD, s/p PCI, most recent 2/2022  -c/w ac, asa  -plavix on hold per neurosx   -statin     #carotid stenosis   -previous MRA  noted with Greater than 75% stenosis of the right distal common carotid artery. Approximately 60% stenosis of the proximal left internal carotid artery.  -carotid dopplers 5/11 noted :  There is new occlusion of the left internal carotid artery;  greater than 70% stenosis involving the distal right common carotid artery as was seen previously. Mild to moderate flow-limiting stenosis is seen at the left external   carotid artery.  -CTa head and neck 5/12 noted  -in light of gi bleed on triple therapy, carotid stenting deferred for now due to concern ability to treat post with DAP   -await possible CEA- patient remains stable from cardiac standpoint and can proceed for CEA with acceptable cardiac risk   -biggest concern for CEA would be neuro risk of atiya/post op CVA/IVH in light of overall status, recent cva on imaging, carotid disease burden, unilateral occlusion of ica and likely diffuse cerebrovascular disease   -holding plavix, cont ASA/eliquis for afib  -family to decide on any potential cea    #anemia  -sp egd 6/1 Grade C esophagitis, non bleeding erosive gastropathy, duodenitis, Non obstructing bleeding duodenal ulcer (2nd portion, 10mm in largest diameter) with active bleeding - endoclip x4 placed with control of bleeding, additional 6mm non bleeding clean based duodenal ulcer (2nd portion)  -on ppi gtt, sp PRBC , trend cbc   -GI fu

## 2022-06-03 NOTE — CONSULT NOTE ADULT - SUBJECTIVE AND OBJECTIVE BOX
[facility] DEPARTMENT OF OPHTHALMOLOGY - INITIAL ADULT CONSULT  -----------------------------------------------------------------------------------------------------------------  Dimitrios Yu MD, PGY1  Available on [employer] Teams  -----------------------------------------------------------------------------------------------------------------    HPI:  Patient with a history of CAD with past multiple PCI, recent NSTEMI in Feb 2022 w/ LULU on ASA off Plavix, chronic atrial fibrillation on Pradaxa, essential HTN, type 2 DM on insulin, diabetic neuropathy with chronic RIGHT LE venous stasis changes, past endarterectomy LEFT CEA in 2014. with patient self referring to the ER following apparently treatment by an urgent care center for an apparent cystitis with hematuria on nitrofurantoin but with patient noting decreased urine output for the past week, and difficulty with B/L coarse tremors for several weeks.  Patient with increasing B/L LE oedema and weight gain, with patient with 2 pillow orthopnoea. Ophthalmology consulted for right eye pain.    According to nursing staff, patient AAO status has been at 0. Patient had decreased urine output, found to also have new stenosis and occlusion of left external carotid. Staff noting that patient has been particularly uncomfortable and had significant edema of the right periorbital area when brought to the NSICU. Nursing staff states that the edema and swelling has improved, but patient has some cloudiness on the surface of the eye and that he has been complaining of pain, yelling stop. Patient unable to contribute to his history himself due to AAO status. Appears to have small discomfort to touching of his lid moreso than photophobia as the patient screams stop anytime his lids are touched, particularly on the left eye. Otherwise uncooperative with ROS. Limited exam.      Past Medical History:  carotid stenosis, ESRD on HD, CAD, NSTEMI s/p 3 stents, ischemic cardiomyopathy, HTN, T2DM, CVA, TIA, Afib  Past Ocular History:  unclear, will follow with family for collateral  Allergies:  nitrofurantoin    Review of Systems: Unable to assess due to patient AMS  Constitutional: No fever, chills  Eyes: No blurry vision, flashes, floaters, FBS, erythema, discharge, double vision, OU  Neuro: No tremors  Cardiovascular: No chest pain, palpitations  Respiratory: No SOB, no cough  GI: No nausea, vomiting, abdominal pain    Vital Signs: T(C): 36.9 (06-02-22 @ 19:15)  T(F): 98.5 (06-02-22 @ 19:15), Max: 98.9 (06-02-22 @ 07:00)  HR: 100 (06-02-22 @ 21:00) (81 - 130)  BP: --  RR:  (15 - 26)  SpO2:  (94% - 100%)  Wt(kg): --    Ophthalmology Exam:  Visual acuity (cc): unable to assess 2/2 AMS  Pupils: PERRL OU, no APD appreciated on exam  Intraocular Pressure:  STP OU  Extraocular movements (EOMs): severely limited exam due to AMS and lack of cooperation, most movements appear intact as patient avoids light on DFE  Confrontational Visual Field (CVF): unable to assess 2/2 AMS  Color Plates: unable to assess 2/2 AMS    Pen Light Exam (PLE)  External: no vesicular lesions, no ulcerations, small area of erythema on right frontal forehead, small lateral canthal scab with bleeding on the right eye  Lids/Lashes/Lacrimal Ducts: floppy eyelid, mild erythema, edema of the UL on the right eye; crusting present on lashes, some minor discharge OD. wnl OS.  Sclera/Conjunctiva: trace injection OD. white and quiet OS.  Cornea: significant haze and difficult view through cornea, stain with dendritic vs pseudodendritic pattern on fluorescein OD. clear OS.  Anterior Chamber: Deep and formed OU.    Iris: Flat OU.  Lens: trace NS OU    Fundus Exam: dilated with 1% tropicamide and 2.5% phenylephrine  Approval obtained from primary team for dilation  Patient aware that pupils can remained dilated for at least 4-6 hours.  Exam performed with 20 D lens    Vitreous: difficult view due to haziness, red reflex present in all quadrants OD. wnl OS  Disc, cup/disc: difficult view 2/2 haziness, margins appropriate, nerve appears flat, sharp and pink, no signs of elevation OD. 0.4 CDR OS.   Macula: red reflex present, no necrosis appreciated OD. wnl OS  Vessels: wnl OU  Periphery: no necrosis appreciated OD, wnl OU    Labs/Imaging:  < from: MR Head No Cont (05.31.22 @ 08:42) >  Limited MRI was performed using axial diffusion and susceptibility   weighted sequence as well as axial T2-weighted sequence. This exam is   compared with prior brain MRI performed on May 11, 2022.    Scattered areas of abnormal T2 prolongation with restricted diffusion is   seen involving the bilateral corona radiata and centrum semiovale region   as well as the left periatrial region. These findings compatible with   areas of acute infarcts. These findings could be secondary to systemic   etiology. Please correlate clinically.    Parenchymal volume loss and chronic microvascular ischemic changes are   identified    Multiple inflammatory changes seen involving the right mastoid.    The visualized paranasal sinuses mastoid and middle ear regions appear   clear.    IMPRESSION: Acute infarcts as described above.    < end of copied text >

## 2022-06-03 NOTE — PROGRESS NOTE ADULT - ATTENDING COMMENTS
68 yo man with h/o HTN, DM, ESRD on HD, CAD s/p LULU to LAD, Afib on Pradaxa (CHADs2 vasc 7), diabetic neuropathy with chronic R LE venous stasis changes>left, prior endarterectomy L CEA in 2014, on ASA and Pradaxa at home (no Plavix), admitted 4/3 with worsening oliguria, proceeded to requiring HD this admission, continued to have poor mental status (speaking a few words, following only simple commands) with MRI 5/14 showing b/l watershed strokes, with L ICA occlusion and severe R ICA stenosis, for which he was started on Plavix in addition to ASA and Pradaxa in preparation for carotid stent. With hypotension, hypoxia and new R sided weakness and global aphasia on 5/30, with CT/CTAP stable. Repeat MRI 5/31 with increased watershed strokes. Found to have an upper GI bleed from a duodenal ulcer s/p 4 clips, also noted to have another clean based duodenal ulcer and gastritis seen on EGD 6/1.     No episodes of melena overnight, did not require any more transfusions.     On exam, alert, states his name and oriented to hospital with choices, states that he has a wife and daughter, follows simple commands, EOMI, with R sided neglect, R arm AG with drift, L arm AG, R leg withdraws 1-2/5, L leg 2/5    Assessment: from having multidisciplinary discussions with multiple services, patient is high risk for rebleeding if back on DAPT and AC. Will continue to discuss options for revascularization of the R carotid as patient had developed worsening watershed strokes across this month demonstrating symptomatic hypoperfusion.  I was personally present at the discussion Blair who was at bedside with patient's wife and on the phone with her daughter on 6/2. CEA and high risk of CEA were discussion as well as very high risk of recurrent strokes from hypoperfusion if no intervention is done. The family will think about the best option for the patient.     q1h NC  f/u with family and vascular surgery re CEA  c/w ASA and low dose Apixaban 2.5 mg BID, off Plavix since no plan for stent right now. Discussed with GI re going up to Eliquis 5 mg BID, will proceed with this plan and observe patient for GI bleed  PT/OT/swallow eval today  c/w atorvastatin  SBP strictly 120-180, off onesiom   on metoprolol to 25 mg q6h  PPI ggt for melena and Carafate, GI following  discuss going up to goal tube feeds with GI  transfuse for Hg>8, CBC this afternoon   ophto for R eye ceratitis     Patient is critically ill due to symptomatic carotid stenosis and Upper GI hemorrhage and at high risk for neurological deterioration or death due to: symptomatic carotid stenosis at high risk for strokes and Upper GI hemorrhage, with risk for recurrent hemorrhage on full AC.

## 2022-06-03 NOTE — PROGRESS NOTE ADULT - ASSESSMENT
66 y/o M with PMH of pleural effusion (2019 s/p R thoracentesis, then R CT placement with course c/b R hydroPTX - tx to [facility] for possible VATs decortication which was deferred), CAD, NSTEMI s/p stents (2014 stents to LAD, proximal and distal LCx, and additional LULU to proximal LAD 2/2022), ischemic cardiomyopathy, HTN, T2DM c/b peripheral neuropathy, CVA, TIA, Afib, chronic RLE wound, L carotid stenosis s/p endarterectomy (2014). Presented to ED with hematuria, B/L LE edema, weight gain, and orthopnea - found to be in volume overload, renal failure. Course c/b worsening mental status, GIB.

## 2022-06-03 NOTE — SWALLOW BEDSIDE ASSESSMENT ADULT - SLP GENERAL OBSERVATIONS
Pt asleep in bed, open mouth posture, KFT in place, + B/L UE restraints. Woke to verbal and tactile stimuli. Unable to communicate needs or follow directions for exam. Paucity of verbal output. Intermittently yelling "no" and swatting at clinician.
Pt received at bedside with HOB elevated, with noted lethargy, but easily aroused. +son at bedside. Pt AOx2-3 (person, place, year only), with confusion, and slight agitation resultng in overall reduced cooperation with directives throughout assessment.

## 2022-06-03 NOTE — EEG REPORT - NS EEG TEXT BOX
[facility]   COMPREHENSIVE EPILEPSY CENTER   REPORT OF LONG-TERM VIDEO EEG     [facility]: [address], [place] [address], Ph#: [phone]  [facility]: [address] [place] [address], Ph#: [phone]  [facility]: [address] [place] [address], Ph#: [phone]    Patient Name: DYLAN DEL CASTILLO  Age and : 67y ([birth year])  MRN #: [id number]  Location: [place] [address]  Referring Physician: Danielle Nolan    Start Time/Date: 08:00 on 2022  End Time/Date: 17:29 on 2022  Duration: 09:99  _____________________________________________________________  STUDY INFORMATION    EEG Recording Technique:  The patient underwent continuous Video-EEG monitoring, using Telemetry System hardware on the XLTek Digital System. EEG and video data were stored on a computer hard drive with important events saved in digital archive files. The material was reviewed by a physician (electroencephalographer / epileptologist) on a daily basis. Jimi and seizure detection algorithms were utilized and reviewed. An EEG Technician attended to the patient, and was available throughout daytime work hours.  The epilepsy center neurologist was available in person or on call 24-hours per day.    EEG Placement and Labeling of Electrodes:  The EEG was performed utilizing 20 channel referential EEG connections (coronal over temporal over parasagittal montage) using all standard 10-20 electrode placements with EKG, with additional electrodes placed in the inferior temporal region using the modified 10-10 montage electrode placements for elective admissions, or if deemed necessary. Recording was at a sampling rate of 256 samples per second per channel. Time synchronized digital video recording was done simultaneously with EEG recording. A low light infrared camera was used for low light recording.   _____________________________________________________________  HISTORY    Patient is a 67y old  Male who presents with a chief complaint of Decreased urine output for the past week, leg oedema (2022 09:59)    PERTINENT MEDICATION:  none  _____________________________________________________________  STUDY INTERPRETATION    Findings: The background was continuous, spontaneously variable and reactive. During wakefulness, No posterior dominant rhythm seen.    Background Slowing:  Diffuse theta and polymorphic delta slowing.    Focal Slowing:   Near continuous delta slowing over the left temproal region.    Sleep Background:  Drowsiness was characterized by fragmentation, attenuation, and slowing of the background activity.    Sleep was characterized by the presence of vertex waves, symmetric sleep spindles and K-complexes.    Other Non-Epileptiform Findings:  None were present.    Interictal Epileptiform Activity:   None were present.    Events:  Clinical events: None recorded.  Seizures: None recorded.    Activation Procedures:   Hyperventilation was not performed.    Photic stimulation was not performed.     Artifacts:  Intermittent myogenic and movement artifacts were noted.    ECG:  The heart rate on single channel ECG was predominantly between  BPM.    _____________________________________________________________  EEG SUMMARY/CLASSIFICATION    Abnormal EEG in an encepalopathic patient.  - Near continuous slowing over the left temporal region  - Moderate generalized slowing.    _____________________________________________________________  EEG IMPRESSION/CLINICAL CORRELATE    Abnormal EEG study.  Structural abnormality in the left temporal region  Moderate nonspecific diffuse or multifocal cerebral dysfunction.   No epileptiform pattern or seizure seen.    Terrance Armstrong  EEG Technician    Reading Room: [phone]  On Call Service After Hours: [phone]    ** PRELIM REPORT - PENDING EPILEPSY ATTENDING REVIEW **

## 2022-06-03 NOTE — PROGRESS NOTE ADULT - ASSESSMENT
Assessment and Recommendations:  Assessment and Recommendations:  67y male with a past medical history/ocular history of CAD, ESRD on HD, T2DM, HTN, cardiomyopathy, CVA, Afib, carotid stenosis consulted for right eye pain, found to have epi defect representing dendritic vs pseudodendritic ulceration suggestive of HSV  keratitis.     1) HSV keratitis, right eye  - patient with a classic dendritic ulcer pattern suggestive of HSV keratitis  - no vesicular or ulcerative lesions noted around the eye on skin  - no necrosis noted on DFE, with red reflex present in all four quadrants of the right eye  - c/w Zirgan (ganciclovir) 0.15% ophthalmic gel 5 times a day or trifluridine 1% drops nine times per day if gel is unavailable   - c/w Ocuflox (ofloxacin) QID  - c/w starting erythromycin ointment qhs right eye  - c/w Valtrex 500mg PO TID  - c/w cool soaks to skin lesions should they develop TID or PRN  - discussed findings with pts wife and primary team  - ophthalmology will follow    Patient was seen and discussed with attending Dr. Ball       Outpatient follow-up: Patient should follow-up with his/her ophthalmologist or with [facility] Department of Ophthalmology within 1 week of after discharge at:    [address]  [place] [address]  [phone]    Higinio Victor MD, PGY-3  Also available on [employer] Teams

## 2022-06-03 NOTE — SWALLOW BEDSIDE ASSESSMENT ADULT - COMMENTS
hospital course con't  5/11: Vascular surgery: pt with new R ICA occlusion with recs for outpt f/u to reeval carotid patency with repeat CTA/MRA given sub-optimal visualization here.  5/23 Order received for bedside swallowing evaluation with reports of pt coughing with meals. Swallow eval 5/24 with recommendations for regular/thin with no  overt s/s of aspiration.  5/30 stroke code activated for altered mental status. MRI brain shows new small, scattered infarcts, more in left hemisphere. transferred to NSCU.  5/31 s/p angio  6/1 episode of melena concerning for UGIB. GI completed EGD with duodenal ulcer clipping. 1x transfusion warranted given acute GI bloss loss anemia. pt with Grade C esophagitis, non bleeding erosive gastropathy, duodenitis. con't PPI and carafate.  6/2 pt NPO since 5/30, today NGT placed and feeds started  Pulm >Pt with hx of hydroPTX. CT chest in 2/2022 with pleural thickening, atelectasis. Findings at the time suspected to be chronic.   -CT chest 4/4 with small b/l pl effusion R>L, bibasilar atelectasis-Appeared mildly labored last week. CT chest 5/27 with no PNA, but with increase in L pl effusion. No longer with labored breathing, no plans for thoracentesis. Suggest optimization of fluid removal with HD.     Neuro 6/2> more lethargic today, not attending to questions or directives, yelling. NIH20. vEEG (-) for seizures; pt continues to hae multifactorial delirium, worsening in last 24 hours, need for HD. Recs for CEA vs stent given high grade R stenosis and high risk of recurrent stroke. consult vascular sx and neurointervention for risk of CEA vs stent.    Card 6/2> carotid stenosis, waiting for carotid angio, likely planning CEA.

## 2022-06-03 NOTE — PROGRESS NOTE ADULT - ASSESSMENT
67 year old gentleman with PMH of CAD, NSTEMI s/p 3 stents in 2014 (stents to LAD, proximal and distal LCx), ischemic cardiomyopathy, HTN for 10 years, T2DM for 26 years c/b peripheral neuropathy, CVA, TIA, Afib on Pradaxa, chronic RLE wound, L carotid stenosis s/p endarterectomy (2014) just recently admitted  to the [facility] on 2/19/2022 with acute onset chest pain for one day found to have an NSTEMI, CAD, s/p PCI in setting of increased AF rates off bb for 3 days and resolved chest pain, his CKMB peaked and Echo noted with EF 60%,normal LV function, mild TR, mild TN. He was s/p C with 85% proximal LAD s/p balloon angioplasty and LULU. He presented to [facility] ED with decreased urine output over the week. [facility]. Stevens went to [facility] for hematuria and was started  on Nitrofurantoin. Pt is still taking Nitrofurantoin w/ 5 days left. He came to the ED due to worsening symptoms. Pt reports having a similar incident 4-5 years ago that resolved spontaneously. He reports increased leg edema Dyspnea worse on exertion. his baseline Serum creatinine is in the 2.0 to 2.3 mg/dl range. ANT with serum creatinine of 8.29 with bun 144 and k 5.5      1- ESRD   2- chf chronic   3- hyperphosphatemia /shpt   4- anemia   5- hyperlipidemia   6- HTN /a fib  7- TIA hx   8- hx of carotid stenosis  9- GIB      no HD today  off pressors  blood cx NGTD  anemia - required prbc  5/31  epogen 24701 Units TIW with HD.    s/p EGD 6/1 for melena, found duodenal ulcer s/p clip  trend hgb  possible CEA when optimized, awaiting family decisions given high risk atiya/post cva risk

## 2022-06-03 NOTE — PROGRESS NOTE ADULT - PROBLEM SELECTOR PLAN 1
Likely 2nd to b/l pl effusions, atelectasis  -Suspect fluid overload given elevated proBNP, LE edema on admission   -Keep O>I with HD as tolerated   -Pt with hx of hydroPTX. CT chest in 2/2022 with pleural thickening, atelectasis. Findings at the time suspected to be chronic. CT A/P 4/2 with small b/l pl effusions L>R, pleural thickening  -CT chest 4/4 with small b/l pl effusion R>L, bibasilar atelectasis  -Appeared mildly labored last week. CT chest 5/27 with no PNA, but with increase in L pl effusion. No longer with labored breathing, no plans for thoracentesis. Suggest optimization of fluid removal with HD.   -Keep sats >90% with supplemental O2 PRN  -Suction PRN

## 2022-06-03 NOTE — PROGRESS NOTE ADULT - SUBJECTIVE AND OBJECTIVE BOX
HPI:  Patient remotely known to me from an admission to [place] in Feb 2017--assigned to me at this point by the ER to admit this 66 y/o M--followed by his nephrologist above--patient with a history of CAD with past multiple PCI, with most recent discharge from [place] in Feb 2022 for non ST elevation MI with LULU of an 85% prox LAD lesion with patient on ASA and now off Plavix per cardiology (only for one month), chronic atrial fibrillation maintained on Pradaxa, essential HTN, type 2 DM previously on oral medications but on insulin, diabetic neuropathy with chronic RIGHT LE venous stasis changes>left, LEFT foot ulcer seen by podiatry, past endarterectomy LEFT CEA in 2014. with patient self referring to the ER following apparently treatment by an urgent care center for an apparent cystitis with hematuria on nitrofurantoin but with patient noting decreased urine output for the past week, and difficulty with B/L coarse tremors for several weeks, with patient having difficulty negotiating his applications on his smart phone (observed by examiner).  Patient with increasing B/L LE oedema and weight gain, with patient with 2 pillow orthopnoea.      OVERNIGHT EVENTS:   Received one unit PRBC  overnight.    VITALS:  T(C): , Max: 37.2 (06-02-22 @ 07:00)  HR:  (78 - 130)  BP: --  ABP:  (120/38 - 180/62)  RR:  (15 - 33)  SpO2:  (94% - 100%)  Wt(kg): --      06-01-22 @ 07:01  -  06-02-22 @ 07:00  --------------------------------------------------------  IN: 908.7 mL / OUT: 0 mL / NET: 908.7 mL    06-02-22 @ 07:01  -  06-03-22 @ 06:48  --------------------------------------------------------  IN: 677.5 mL / OUT: 500 mL / NET: 177.5 mL      LABS:  Na: 137 (06-02 @ 22:44), 137 (06-01 @ 22:06), 135 (05-31 @ 22:52)  K: 3.6 (06-02 @ 22:44), 4.8 (06-01 @ 22:06), 3.5 (05-31 @ 22:52)  Cl: 98 (06-02 @ 22:44), 99 (06-01 @ 22:06), 97 (05-31 @ 22:52)  CO2: 28 (06-02 @ 22:44), 23 (06-01 @ 22:06), 25 (05-31 @ 22:52)  BUN: 15 (06-02 @ 22:44), 11 (06-02 @ 19:34), 44 (06-02 @ 15:13), 40 (06-01 @ 22:06), 26 (05-31 @ 22:52)  Cr: 3.01 (06-02 @ 22:44), 5.46 (06-01 @ 22:06), 3.41 (05-31 @ 22:52)  Glu: 110(06-02 @ 22:44), 143(06-01 @ 22:06), 121(05-31 @ 22:52)    Hgb: 8.7 (06-02 @ 22:44), 8.4 (06-02 @ 05:27), 7.5 (06-01 @ 22:06), 7.9 (06-01 @ 14:14), 8.4 (06-01 @ 05:11), 7.8 (05-31 @ 22:50), 7.1 (05-31 @ 15:48), 6.4 (05-31 @ 09:40)  Hct: 28.0 (06-02 @ 22:44), 27.6 (06-02 @ 05:27), 24.7 (06-01 @ 22:06), 26.2 (06-01 @ 14:14), 26.8 (06-01 @ 05:11), 26.0 (05-31 @ 22:50), 23.5 (05-31 @ 15:48), 22.3 (05-31 @ 09:40)  WBC: 14.03 (06-02 @ 22:44), 13.07 (06-02 @ 05:27), 14.03 (06-01 @ 22:06), 16.41 (06-01 @ 14:14), 19.31 (06-01 @ 05:11), 20.91 (05-31 @ 22:50), 19.72 (05-31 @ 15:48), 19.15 (05-31 @ 09:40)  Plt: 234 (06-02 @ 22:44), 253 (06-02 @ 05:27), 259 (06-01 @ 22:06), 288 (06-01 @ 14:14), 323 (06-01 @ 05:11), 327 (05-31 @ 22:50), 396 (05-31 @ 15:48), 407 (05-31 @ 09:40)    INR: 2.20 06-01-22 @ 05:11  PTT: 32.5 06-01-22 @ 05:11    IMAGING:   Recent imaging studies were reviewed.    MEDICATIONS:  acetaminophen     Tablet .. 650 milliGRAM(s) Oral every 6 hours PRN  allopurinol 100 milliGRAM(s) Oral daily  apixaban 2.5 milliGRAM(s) Oral two times a day  aspirin  chewable 81 milliGRAM(s) Oral daily  atorvastatin 40 milliGRAM(s) Oral at bedtime  chlorhexidine 4% Liquid 1 Application(s) Topical <User Schedule>  erythromycin   Ointment 1 Application(s) Right EYE at bedtime  influenza  Vaccine (HIGH DOSE) 0.7 milliLiter(s) IntraMuscular once  insulin lispro (ADMELOG) corrective regimen sliding scale   SubCutaneous every 6 hours  lidocaine   4% Patch 1 Patch Transdermal daily  metoprolol tartrate 25 milliGRAM(s) Oral every 6 hours  Nephro-reena 1 Tablet(s) Oral daily  ofloxacin 0.3% Solution 1 Drop(s) Right EYE four times a day  pantoprazole Infusion 8 mG/Hr IV Continuous <Continuous>  povidone iodine 10% Solution 1 Application(s) Topical daily  sucralfate 1 Gram(s) Oral every 12 hours  trifluridine 1% Solution 1 Drop(s) Right EYE every 3 hours    EXAMINATION:  General:  calm  HEENT:  MMM  Neuro:  Awake, Agitated, oriented x 0, following commands, right sided weakness UE 3/5, LE 2/5  Cards:  RRR  Respiratory:  no respiratory distress  Adomen:  soft  Extremities:  no edema  Skin:  warm/dry   HPI:  Patient remotely known to me from an admission to [place] in Feb 2017--assigned to me at this point by the ER to admit this 66 y/o M--followed by his nephrologist above--patient with a history of CAD with past multiple PCI, with most recent discharge from [place] in Feb 2022 for non ST elevation MI with LULU of an 85% prox LAD lesion with patient on ASA and now off Plavix per cardiology (only for one month), chronic atrial fibrillation maintained on Pradaxa, essential HTN, type 2 DM previously on oral medications but on insulin, diabetic neuropathy with chronic RIGHT LE venous stasis changes>left, LEFT foot ulcer seen by podiatry, past endarterectomy LEFT CEA in 2014. with patient self referring to the ER following apparently treatment by an urgent care center for an apparent cystitis with hematuria on nitrofurantoin but with patient noting decreased urine output for the past week, and difficulty with B/L coarse tremors for several weeks, with patient having difficulty negotiating his applications on his smart phone (observed by examiner).  Patient with increasing B/L LE oedema and weight gain, with patient with 2 pillow orthopnoea.      OVERNIGHT EVENTS:   Right keratoconjunctivitis     VITALS:  T(C): , Max: 37.2 (06-02-22 @ 07:00)  HR:  (78 - 130)  BP: --  ABP:  (120/38 - 180/62)  RR:  (15 - 33)  SpO2:  (94% - 100%)  Wt(kg): --      06-01-22 @ 07:01  -  06-02-22 @ 07:00  --------------------------------------------------------  IN: 908.7 mL / OUT: 0 mL / NET: 908.7 mL    06-02-22 @ 07:01  -  06-03-22 @ 06:48  --------------------------------------------------------  IN: 677.5 mL / OUT: 500 mL / NET: 177.5 mL      LABS:  Na: 137 (06-02 @ 22:44), 137 (06-01 @ 22:06), 135 (05-31 @ 22:52)  K: 3.6 (06-02 @ 22:44), 4.8 (06-01 @ 22:06), 3.5 (05-31 @ 22:52)  Cl: 98 (06-02 @ 22:44), 99 (06-01 @ 22:06), 97 (05-31 @ 22:52)  CO2: 28 (06-02 @ 22:44), 23 (06-01 @ 22:06), 25 (05-31 @ 22:52)  BUN: 15 (06-02 @ 22:44), 11 (06-02 @ 19:34), 44 (06-02 @ 15:13), 40 (06-01 @ 22:06), 26 (05-31 @ 22:52)  Cr: 3.01 (06-02 @ 22:44), 5.46 (06-01 @ 22:06), 3.41 (05-31 @ 22:52)  Glu: 110(06-02 @ 22:44), 143(06-01 @ 22:06), 121(05-31 @ 22:52)    Hgb: 8.7 (06-02 @ 22:44), 8.4 (06-02 @ 05:27), 7.5 (06-01 @ 22:06), 7.9 (06-01 @ 14:14), 8.4 (06-01 @ 05:11), 7.8 (05-31 @ 22:50), 7.1 (05-31 @ 15:48), 6.4 (05-31 @ 09:40)  Hct: 28.0 (06-02 @ 22:44), 27.6 (06-02 @ 05:27), 24.7 (06-01 @ 22:06), 26.2 (06-01 @ 14:14), 26.8 (06-01 @ 05:11), 26.0 (05-31 @ 22:50), 23.5 (05-31 @ 15:48), 22.3 (05-31 @ 09:40)  WBC: 14.03 (06-02 @ 22:44), 13.07 (06-02 @ 05:27), 14.03 (06-01 @ 22:06), 16.41 (06-01 @ 14:14), 19.31 (06-01 @ 05:11), 20.91 (05-31 @ 22:50), 19.72 (05-31 @ 15:48), 19.15 (05-31 @ 09:40)  Plt: 234 (06-02 @ 22:44), 253 (06-02 @ 05:27), 259 (06-01 @ 22:06), 288 (06-01 @ 14:14), 323 (06-01 @ 05:11), 327 (05-31 @ 22:50), 396 (05-31 @ 15:48), 407 (05-31 @ 09:40)    INR: 2.20 06-01-22 @ 05:11  PTT: 32.5 06-01-22 @ 05:11    IMAGING:   Recent imaging studies were reviewed.    MEDICATIONS:  acetaminophen     Tablet .. 650 milliGRAM(s) Oral every 6 hours PRN  allopurinol 100 milliGRAM(s) Oral daily  apixaban 2.5 milliGRAM(s) Oral two times a day  aspirin  chewable 81 milliGRAM(s) Oral daily  atorvastatin 40 milliGRAM(s) Oral at bedtime  chlorhexidine 4% Liquid 1 Application(s) Topical <User Schedule>  erythromycin   Ointment 1 Application(s) Right EYE at bedtime  influenza  Vaccine (HIGH DOSE) 0.7 milliLiter(s) IntraMuscular once  insulin lispro (ADMELOG) corrective regimen sliding scale   SubCutaneous every 6 hours  lidocaine   4% Patch 1 Patch Transdermal daily  metoprolol tartrate 25 milliGRAM(s) Oral every 6 hours  Nephro-reena 1 Tablet(s) Oral daily  ofloxacin 0.3% Solution 1 Drop(s) Right EYE four times a day  pantoprazole Infusion 8 mG/Hr IV Continuous <Continuous>  povidone iodine 10% Solution 1 Application(s) Topical daily  sucralfate 1 Gram(s) Oral every 12 hours  trifluridine 1% Solution 1 Drop(s) Right EYE every 3 hours    EXAMINATION:  General:  calm  HEENT:  Right conjunctival discharge and congestion   Neuro:  Awake, Agitated, oriented x 0, following commands, Left side AG, right sided weakness UE 3/5, LE 2/5  Cards:  RRR  Respiratory:  no respiratory distress  Adomen:  soft  Extremities:  ischemic changes   Skin:  warm/dry

## 2022-06-03 NOTE — CONSULT NOTE ADULT - ASSESSMENT
Impression:  This is a 67 year old gentleman pmhx CAD s/p MI/PCI/CABG, hx TIA, afib on rivaroxaban, HTN, DM2, PVD, gout, L CEA 2014, and CKD who presented to [facility] 4/11/22 with ANT, tremors, confusion and lethargy.  CT head no acute changes.  S/p initiation of hemodialysis.  Mental status had improved dramatically.  Pt denies any headaches, no slurred speech, no hemiparesis.  He has chronic gout with bilateral finger swelling, pain.  He also has chronic neuropathy.  Both legs are weak.      Persistent encephalopathy prompted re-consultation on 5/10/22.  MRI brain was ordered and revealed small acute infarctions in bilateral posterior centrum semiovale and left corona radiata and left posterior periventricular white matter.  Carotid ultrasound was performed showing new occlusion of the left internal carotid artery, along with greater than 70% stenosis involving the distal right common carotid artery, and mild to moderate flow-limiting stenosis is seen at the left external carotid artery.  Vascular surgery has been consulted and is recommending CTA.  He continues to be encephalopathic and lethargic.      5/30 stroke code activated for altered mental status.   MRI brain shows new small, scattered infarcts, more in left hemisphere.  Unclear if cause of encephalopathy   s/p DU clipping, needed transfusion on 6/1 cammie  v    Diagnosis and Recommendations:  1.  Multifactorial delirium - has been waxing and waning through his hospital course, now improving but not yet the best seen by this service  video EEG  negative for seizure now off   mental status has improved in last 24 hours    2. Complete left ICA occlusion and 70% distal CCA stenosis. Has collateral flow via acomm and pcomm but should be re-evaluated for either CEA or carotid stenting. CEA may be ideal considering issues with medication compliance and potential difficulty adhering to DAPT regimen but defer to proceduralists regarding this. On 5/25, Dr David discussed extensively with son at bedside and daughter via FaceTime: all options here have risk associated with them.  Opening the artery may or may not help.  However, it will reduce long-term stroke risk.  It might help with his mental status by helping brain perfusion, though this cannot be guaranteed.  There is risk associated with procedure.           - MRA was suboptimal not visualizing the neck but MRA head showed left KESHAWN coming from ACOM and left PCA feeding left MCA.  ICA showed no flow.    - in setting of atrial fibrillation infarcts could potentially also be cardioembolic, continue anticoagulation as per cardiology  - s/p carotid doppler, suboptimal CTA/MRA.  However, it is apparent that there is a proximal left ICA occlusion, which would not be a candidate for intervention.  Intervention for right CCA/ICA stenosis would be high risk but it may indeed by a symptomatic stenosis.   - ECHO in February 2022 did not reveal severe cardiomyopathy, vegetation, or LV thrombus.    - HgA1c of 11 also driving stroke risk.  Goal is < 7  - continue high-intensity statin therapy    The issue is the high grade right stenosis and high risk of recurrent stroke, appreciate notes from neurointervention on risks of cea vs stent, vasc note appreciated     reviewed case with pt and RN  note in progress not consult issue with system could not place as progress note

## 2022-06-03 NOTE — PROGRESS NOTE ADULT - PROBLEM SELECTOR PLAN 3
I Edward Pinto MD have personally  seen and examined the patient today and have noted the findings and formulated the plan of care.  I Edward Pinto MD have personally seen and examined the patient at bedside today at 5pm

## 2022-06-03 NOTE — PROGRESS NOTE ADULT - SUBJECTIVE AND OBJECTIVE BOX
[facility] DEPARTMENT OF OPHTHALMOLOGY  ------------------------------------------------------------------------------  Higinio Victor MD PGY-3  ------------------------------------------------------------------------------    Interval History: No acute events overnight. Today patient denying blurred vision, eye pain, flashes, floaters, FBS, erythema, or discharge.     MEDICATIONS  (STANDING):  allopurinol 100 milliGRAM(s) Oral daily  apixaban 5 milliGRAM(s) Oral two times a day  aspirin  chewable 81 milliGRAM(s) Oral daily  atorvastatin 40 milliGRAM(s) Oral at bedtime  chlorhexidine 4% Liquid 1 Application(s) Topical <User Schedule>  erythromycin   Ointment 1 Application(s) Right EYE at bedtime  influenza  Vaccine (HIGH DOSE) 0.7 milliLiter(s) IntraMuscular once  insulin lispro (ADMELOG) corrective regimen sliding scale   SubCutaneous every 6 hours  lidocaine   4% Patch 1 Patch Transdermal daily  metoprolol tartrate 25 milliGRAM(s) Oral every 6 hours  Nephro-reena 1 Tablet(s) Oral daily  ofloxacin 0.3% Solution 1 Drop(s) Right EYE four times a day  pantoprazole Infusion 8 mG/Hr (10 mL/Hr) IV Continuous <Continuous>  povidone iodine 10% Solution 1 Application(s) Topical daily  sucralfate 1 Gram(s) Oral every 12 hours  trifluridine 1% Solution 1 Drop(s) Right EYE every 3 hours    MEDICATIONS  (PRN):  acetaminophen     Tablet .. 650 milliGRAM(s) Oral every 6 hours PRN Temp greater or equal to 38C (100.4F), Mild Pain (1 - 3)      VITALS: T(C): 36.5 (06-03-22 @ 15:00)  T(F): 97.7 (06-03-22 @ 15:00), Max: 98.6 (06-02-22 @ 23:00)  HR: 103 (06-03-22 @ 18:00) (78 - 124)  BP: --  RR:  (16 - 33)  SpO2:  (88% - 100%)  Wt(kg): --  General: AAO x 3, appropriate mood and affect    Ophthalmology Exam:  Visual acuity (sc): 20/20 OU  Pupils: PERRL OU, no APD  Ttono: 16 OU  Extraocular movements (EOMs): Full OU, no pain, no diplopia  Confrontational Visual Field (CVF): Full OU    Pen Light Exam (PLE)  External: Flat OU  Lids/Lashes/Lacrimal Ducts: Flat OU    Sclera/Conjunctiva: W+Q OU  Cornea: Cl OU  Anterior Chamber: D+F OU    Iris: Flat OU  Lens: Cl OU   [facility] DEPARTMENT OF OPHTHALMOLOGY  ------------------------------------------------------------------------------  Higinio Victor MD PGY-3  ------------------------------------------------------------------------------    Interval History: No acute events overnight. Today patient denying blurred vision, eye pain, flashes, floaters, FBS, erythema, or discharge.     MEDICATIONS  (STANDING):  allopurinol 100 milliGRAM(s) Oral daily  apixaban 5 milliGRAM(s) Oral two times a day  aspirin  chewable 81 milliGRAM(s) Oral daily  atorvastatin 40 milliGRAM(s) Oral at bedtime  chlorhexidine 4% Liquid 1 Application(s) Topical <User Schedule>  erythromycin   Ointment 1 Application(s) Right EYE at bedtime  influenza  Vaccine (HIGH DOSE) 0.7 milliLiter(s) IntraMuscular once  insulin lispro (ADMELOG) corrective regimen sliding scale   SubCutaneous every 6 hours  lidocaine   4% Patch 1 Patch Transdermal daily  metoprolol tartrate 25 milliGRAM(s) Oral every 6 hours  Nephro-reena 1 Tablet(s) Oral daily  ofloxacin 0.3% Solution 1 Drop(s) Right EYE four times a day  pantoprazole Infusion 8 mG/Hr (10 mL/Hr) IV Continuous <Continuous>  povidone iodine 10% Solution 1 Application(s) Topical daily  sucralfate 1 Gram(s) Oral every 12 hours  trifluridine 1% Solution 1 Drop(s) Right EYE every 3 hours    MEDICATIONS  (PRN):  acetaminophen     Tablet .. 650 milliGRAM(s) Oral every 6 hours PRN Temp greater or equal to 38C (100.4F), Mild Pain (1 - 3)      VITALS: T(C): 36.5 (06-03-22 @ 15:00)  T(F): 97.7 (06-03-22 @ 15:00), Max: 98.6 (06-02-22 @ 23:00)  HR: 103 (06-03-22 @ 18:00) (78 - 124)  BP: --  RR:  (16 - 33)  SpO2:  (88% - 100%)  Wt(kg): --  General: AAO x 0, AMS    Ophthalmology Exam:  Visual acuity (cc): unable to assess 2/2 AMS  Pupils: PERRL OU, no APD appreciated on exam  Intraocular Pressure:  10, 16  Extraocular movements (EOMs): DEVANG 2/2 MS  Confrontational Visual Field (CVF): DEVANG 2/2 MS  Color Plates: DEVANG 2/2 MS    Pen Light Exam (PLE)  External: no vesicular lesions, no ulcerations, small area of erythema on right frontal forehead, small lateral canthal scab with bleeding on the right eye  Lids/Lashes/Lacrimal Ducts: floppy eyelid, mild erythema, edema of the UL on the right eye; crusting present on lashes, some minor discharge OD. wnl OS.  Sclera/Conjunctiva: trace injection OD. white and quiet OS.  Cornea: significant haze and difficult view through cornea, stain with dendritic pattern on fluorescein OD. clear OS.  Anterior Chamber: Deep and formed OU.    Iris: Flat OU.  Lens: trace NS OU

## 2022-06-03 NOTE — DISCHARGE NOTE NURSING/CASE MANAGEMENT/SOCIAL WORK - PATIENT PORTAL LINK FT
You can access the FollowMyHealth Patient Portal offered by [facility] by registering at the following website: [email or web address]. By joining [employer]’s FollowMyHealth portal, you will also be able to view your health information using other applications (apps) compatible with our system.

## 2022-06-03 NOTE — SWALLOW BEDSIDE ASSESSMENT ADULT - ASR SWALLOW RECOMMEND DIAG
Objective testing is not clinically appropriate at present time
Not indicated at this time; will continue to assess for clinical indication of further dysphagia w/u

## 2022-06-03 NOTE — SWALLOW BEDSIDE ASSESSMENT ADULT - SLP PERTINENT HISTORY OF CURRENT PROBLEM
hx con't Pt admitted for decreased urine output and leg edema, hospital course c/b need for emergent HD, acute strokes (per MRI 5/12 and 5/30) and new R ICA occlusion, acute uremia,, UGIB, hypoperfusion episode, persistent encephalopathy with worsening mental status, and now transferred from PICU to NSCU.4/3 s/p emergent L femoral shiley for emergent HD.  4/12 shliey removed; IJ HD placed. 4/17 s/p AVF. 4/20 sp permacath. 5/10 Neuro Impressions: Persistent encephalopathy. MRI brain revealed small acute infarctions in bilateral posterior centrum semiovale and left corona radiata and left posterior periventricular white matter. Carotid US showing left internal carotid artery stenosis involving the distal right common carotid artery, and mild to moderate flow-limiting stenosis is seen at the left external carotid artery. Recs for vascular surgery consultation for assessment for CEA/carotid stenting.

## 2022-06-03 NOTE — PROGRESS NOTE ADULT - SUBJECTIVE AND OBJECTIVE BOX
Follow-up Pulm Progress Note    more alert today  Satgs 98% 2LNC         Medications:  MEDICATIONS  (STANDING):  allopurinol 100 milliGRAM(s) Oral daily  apixaban 5 milliGRAM(s) Oral two times a day  aspirin  chewable 81 milliGRAM(s) Oral daily  atorvastatin 40 milliGRAM(s) Oral at bedtime  chlorhexidine 4% Liquid 1 Application(s) Topical <User Schedule>  erythromycin   Ointment 1 Application(s) Right EYE at bedtime  influenza  Vaccine (HIGH DOSE) 0.7 milliLiter(s) IntraMuscular once  insulin lispro (ADMELOG) corrective regimen sliding scale   SubCutaneous every 6 hours  lidocaine   4% Patch 1 Patch Transdermal daily  metoprolol tartrate 25 milliGRAM(s) Oral every 6 hours  Nephro-reena 1 Tablet(s) Oral daily  ofloxacin 0.3% Solution 1 Drop(s) Right EYE four times a day  pantoprazole Infusion 8 mG/Hr (10 mL/Hr) IV Continuous <Continuous>  povidone iodine 10% Solution 1 Application(s) Topical daily  sucralfate 1 Gram(s) Oral every 12 hours  trifluridine 1% Solution 1 Drop(s) Right EYE every 3 hours    MEDICATIONS  (PRN):  acetaminophen     Tablet .. 650 milliGRAM(s) Oral every 6 hours PRN Temp greater or equal to 38C (100.4F), Mild Pain (1 - 3)          Vital Signs Last 24 Hrs  T(C): 36.8 (03 Jun 2022 07:00), Max: 37.1 (02 Jun 2022 19:00)  T(F): 98.3 (03 Jun 2022 07:00), Max: 98.8 (02 Jun 2022 19:00)  HR: 100 (03 Jun 2022 08:00) (78 - 130)  BP: --  BP(mean): --  RR: 22 (03 Jun 2022 08:00) (15 - 33)  SpO2: 100% (03 Jun 2022 08:00) (95% - 100%)          06-02 @ 07:01  -  06-03 @ 07:00  --------------------------------------------------------  IN: 780 mL / OUT: 500 mL / NET: 280 mL          LABS:                        8.7    14.03 )-----------( 234      ( 02 Jun 2022 22:44 )             28.0     06-02    137  |  98  |  15  ----------------------------<  110<H>  3.6   |  28  |  3.01<H>    Ca    8.3<L>      02 Jun 2022 22:44  Phos  2.4     06-02  Mg     2.0     06-02            CAPILLARY BLOOD GLUCOSE      POCT Blood Glucose.: 141 mg/dL (03 Jun 2022 05:58)                        CULTURES:     Culture - Blood (collected 05-31-22 @ 02:33)  Source: .Blood Blood  Preliminary Report (06-01-22 @ 09:01):    No growth to date.    Culture - Blood (collected 05-31-22 @ 02:33)  Source: .Blood Blood  Preliminary Report (06-01-22 @ 09:01):    No growth to date.              Physical Examination:  PULM: decreased BS  CVS: S1, S2 heard    RADIOLOGY REVIEWED  CXR 6/2: small L effusion/atelectasis  majority R lung off film     CT chest: < from: CT Chest No Cont (05.27.22 @ 18:46) >  FINDINGS:    LUNGS/PLEURA/AIRWAYS: Patent central airways.  Moderate left pleural   effusion, increased from prior exam, associated with near complete   atelectasis of the left lower lobe. Small right pleural effusion with   adjacent subsegmental atelectasis. Right-sided pleural thickening not   significantly changed from prior exam. Bilateral calcified granulomas.  MEDIASTINUM AND STEPHON: No large mediastinal lymph nodes.  VESSELS: Atherosclerotic calcification of the aorta and its branches.   Right IJ central venous catheter with tip terminating at the superior   cavoatrial junction.  HEART: Cardiomegaly. No pericardial effusion.  CHEST WALL AND LOWER NECK: Within normal limits.  VISUALIZED UPPER ABDOMEN: Unchanged nonspecific bilateral perinephric   stranding.  BONES: Degenerative changes of the spine.    IMPRESSION:  Interval increase in left pleural effusion with near complete atelectasis   of the left lower lobe. Stable small right pleural effusion and pleural   thickening.      < end of copied text >

## 2022-06-03 NOTE — PROGRESS NOTE ADULT - SUBJECTIVE AND OBJECTIVE BOX
NEUROCRITICAL CARE EVENING NOTE    BRIEF SUMMARY:       REVIEW OF SYSTEMS: [x] Unable to Assess due to neurologic exam   [ ] All ROS addressed below are non-contributory, except:  Neuro: [ ] Headache [ ] Back pain [ ] Numbness [ ] Weakness [ ] Ataxia [ ] Dizziness [ ] Aphasia [ ] Dysarthria [ ] Visual disturbance  Resp: [ ] Shortness of breath/dyspnea [ ] Orthopnea [ ] Cough  CV: [ ] Chest pain [ ] Palpitation [ ] Lightheadedness [ ] Syncope  Renal: [ ] Thirst [ ] Edema  GI: [ ] Nausea [ ] Emesis [ ] Abdominal pain [ ] Constipation [ ] Diarrhea  Hem: [ ] Hematemesis [ ] bBright red blood per rectum  ID: [ ] Fever [ ] Chills [ ] Dysuria  ENT: [ ] Rhinorrhea    VITALS/IMAGING/DATA/IVF FLUIDS/MEDICATIONS: [x] Reviewed    ALLERGIES: Allergies  nitrofurantoin (Nephrotoxicity)  Intolerances    EXAMINATION:  General:  calm   HEENT:  right eye shut w/ crusting tender to touch  Neuro:  eyes open with voice, intermittently has simple verbal output(curses when touched), intermittently commands right sided weakness UE 3/5, LE 2/5, left UE & LE spotaneous  Cards:  RRR  Respiratory:  no respiratory distress, LLL crackles   Abdomen:  soft  Extremities:  no edema, left arm w/ AVF w/ palpable thrill, tight knee tophi   Skin:  warm/dry

## 2022-06-03 NOTE — PROGRESS NOTE ADULT - ASSESSMENT
ASSESSMENT: 67M hx CAD s/p stent, afib, HTN, T2DM, CKD now requiring dialysis, L CEA now found to have R ICA stenosis(>70%) started on triple therapy c/b GIB requiring clipping of bleeding ulcer      PLAN:  neurochecks q4, VS q2, c/w Eliquis (increased to 5mg BID)& aspirin, plavix on hold.  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

## 2022-06-03 NOTE — SWALLOW BEDSIDE ASSESSMENT ADULT - SWALLOW EVAL: DIAGNOSIS
Pt is a 66 y/o M admitted for decreased urine output course c/b need for urgent HC and acute strokes. Pt now presenting with 1. agitation and PO refusal which severely limits swallow evaluation. The swallow was marked by episodes of lingual thrusting, reduced oral grading and bolus stripping, delayed pharyngeal swallows, and intermittent throat clears post PO intake of limited amounts of moderately thick liquids (less than 1/2 teaspoon). 2. Cognitive linguistic impairments.

## 2022-06-03 NOTE — PROGRESS NOTE ADULT - ASSESSMENT
ASSESSMENT/PLAN:    NEURO:  Neuro Checks Q 1 hr  ASA 81 mg OD  Eliquis 2.5 mg OD  Not candidate for endovascular treatment   CEA    PULM:   RA    CV:   Afib, CAD  SBP goal 120-180  Atorvastatin 40 mg OD  Metoprolol 12.5mg bid with hold measures   Phenylephrine gtt  Eliquis 2.5 mg BID   trops, BNP    RENAL:  Fluids: IVL   ESRD on HD  HD today     GI:  NGT, tube feeding  Melena  Gastritis, PUD post endoscopic clipping 06/01/2022  Consider angio and embolization in case of rebleeding   Pantoprazole gtt  Bowel regimen [] colace [x] senna [x] other: miralax    ENDO:   Goal euglycemia (-180)  ISS    HEME/ONC:  Monitor HH  VTE prophylaxis: [x] SCDs   Eliquis 2.5 mg BID    ID:   Monitor for fever     SOCIAL/FAMILY:  [] awaiting [x] updated regarding transfer [] family meeting    CODE STATUS:  [x] Full Code [] DNR [] DNI [] Palliative/Comfort Care    DISPOSITION:  [x] ICU [] Stroke Unit [] Floor [] EMU [] RCU [] PCU    [x] Patient is at high risk of neurologic deterioration/death due to: sepsis due to unknown organism, hypotension, cerebral hypoperfusion, acute stroke, altered mental status, respiratory failure requiring intubation     Contact: [phone]   ASSESSMENT/PLAN:    NEURO:  Neuro Checks Q 2 hr  ASA 81 mg OD  increase Eliquis to 5 mg OD  Not candidate for endovascular treatment   CEA    PULM:   RA    CV:   Afib, CAD  SBP goal 120-180  Atorvastatin 40 mg OD  Metoprolol 12.5mg bid with hold measures   Phenylephrine gtt  Eliquis 5 mg BID     RENAL:  Fluids: IVL   ESRD on HD    GI:  NGT, tube feeding  Melena  Gastritis, PUD post endoscopic clipping 06/01/2022  Consider angio and embolization in case of rebleeding   Pantoprazole gtt  Bowel regimen [] colace [x] senna [x] other: miralax    ENDO:   Goal euglycemia (-180)  ISS    HEME/ONC:  Monitor HH  VTE prophylaxis: [x] SCDs   Eliquis 5 mg BID    ID:   Monitor for fever     SOCIAL/FAMILY:  [] awaiting [x] updated regarding transfer [] family meeting    CODE STATUS:  [x] Full Code [] DNR [] DNI [] Palliative/Comfort Care    DISPOSITION:  [x] ICU [] Stroke Unit [] Floor [] EMU [] RCU [] PCU    [x] Patient is at high risk of neurologic deterioration/death due to: sepsis due to unknown organism, hypotension, cerebral hypoperfusion, acute stroke, altered mental status, respiratory failure requiring intubation     Contact: [phone]

## 2022-06-03 NOTE — SWALLOW BEDSIDE ASSESSMENT ADULT - H & P REVIEW
67 yoM with PMH of CAD, NSTEMI s/p 3 stents in 2014 (stents to LAD, proximal and distal LCx), ischemic cardiomyopathy, HTN for 10 years, T2DM for 26 years c/b peripheral neuropathy, CVA, TIA, Afib on Pradaxa, chronic RLE wound, L carotid stenosis s/p endarterectomy (2014) just recently admitted  to the [facility] on 2/19/2022 with acute onset chest pain for one day found to have an NSTEMI, CAD, s/p PCI in setting of increased AF rates off bb for 3 days and resolved chest pain, his CKMB peaked and Echo noted with EF 60%,normal LV function, mild TR, mild ME. He was s/p [facility] with 85% proximal LAD s/p balloon angioplasty and LULU. He presented to [facility] ED with decreased urine output over the week. Mr. Stevens went to [facility] MD for hematuria and was put on Nitrofurantoin. Pt is still taking Nitrofurantoin w/ 5 days left. He came to the ED due to worsening symptoms.  He reports increased leg edema.. Dyspnea worse on exertion. Pt reports decreased urination and last urination was 12 hours ago. He follows with a nephrologist Dr. Gallegos and stated his baseline Serum creatinine is in the 2.0 to 2.3 mg/dl range. Nephrology consulted for ANT with serum creatinine of 7.44 mg/dl on  CKD stage 3./yes
67 yoM with PMH of CAD, NSTEMI s/p 3 stents in 2014 (stents to LAD, proximal and distal LCx), ischemic cardiomyopathy, HTN for 10 years, T2DM for 26 years c/b peripheral neuropathy, CVA, TIA, Afib on Pradaxa, chronic RLE wound, L carotid stenosis s/p endarterectomy (2014) just recently admitted  to the [facility] on 2/19/2022 with acute onset chest pain for one day found to have an NSTEMI, CAD, s/p PCI in setting of increased AF rates off bb for 3 days and resolved chest pain, his CKMB peaked and Echo noted with EF 60%,normal LV function, mild TR, mild [place]. He was s/p [facility] with 85% proximal LAD s/p balloon angioplasty and LULU. He presented to [facility] ED with decreased urine output over the week. Mr. Stevens went to [facility] MD for hematuria and was put on Nitrofurantoin. Pt is still taking Nitrofurantoin w/ 5 days left. He came to the ED due to worsening symptoms.  He reports increased leg edema.. Dyspnea worse on exertion. Pt reports decreased urination and last urination was 12 hours ago. He follows with a nephrologist Dr. Gallegos and stated his baseline Serum creatinine is in the 2.0 to 2.3 mg/dl range. Nephrology consulted for ANT with serum creatinine of 7.44 mg/dl on  CKD stage 3./yes
67 yoM with PMH of CAD, NSTEMI s/p 3 stents in 2014 (stents to LAD, proximal and distal LCx), ischemic cardiomyopathy, HTN for 10 years, T2DM for 26 years c/b peripheral neuropathy, CVA, TIA, Afib on Pradaxa, chronic RLE wound, L carotid stenosis s/p endarterectomy (2014) just recently admitted  to the [facility] on 2/19/2022 with acute onset chest pain for one day found to have an NSTEMI, CAD, s/p PCI in setting of increased AF rates off bb for 3 days and resolved chest pain, his CKMB peaked and Echo noted with EF 60%,normal LV function, mild TR, mild MA. He was s/p [facility] with 85% proximal LAD s/p balloon angioplasty and LULU. He presented to [facility] ED with decreased urine output over the week. Mr. Stevens went to [facility] MD for hematuria and was put on Nitrofurantoin. Pt is still taking Nitrofurantoin w/ 5 days left. He came to the ED due to worsening symptoms.  He reports increased leg edema.. Dyspnea worse on exertion. Pt reports decreased urination and last urination was 12 hours ago. He follows with a nephrologist Dr. Gallegos and stated his baseline Serum creatinine is in the 2.0 to 2.3 mg/dl range. Nephrology consulted for ANT with serum creatinine of 7.44 mg/dl on  CKD stage 3./yes

## 2022-06-03 NOTE — PROGRESS NOTE ADULT - SUBJECTIVE AND OBJECTIVE BOX
CARDIOLOGY FOLLOW UP - Dr. Mazariegos  DATE OF SERVICE: 6/3/22     CC no acute events - sp  one unit PRBC  overnight.      REVIEW OF SYSTEMS:  unable to assess given mental status     PHYSICAL EXAM:  T(C): 36.8 (06-03-22 @ 07:00), Max: 37.1 (06-02-22 @ 19:00)  HR: 115 (06-03-22 @ 10:00) (78 - 130)  BP: --  RR: 21 (06-03-22 @ 10:00) (15 - 33)  SpO2: 98% (06-03-22 @ 10:00) (95% - 100%)  Wt(kg): --  I&O's Summary    02 Jun 2022 07:01  -  03 Jun 2022 07:00  --------------------------------------------------------  IN: 780 mL / OUT: 500 mL / NET: 280 mL    03 Jun 2022 07:01  -  03 Jun 2022 11:52  --------------------------------------------------------  IN: 355 mL / OUT: 0 mL / NET: 355 mL        Appearance:  ill appearing 	  Cardiovascular: Normal S1 S2, irreg  Respiratory:  diminished   Gastrointestinal:  Soft, Non-tender, + BS	  Extremities: Normal range of motion, No clubbing, cyanosis or edema      Home Medications:  allopurinol 100 mg oral tablet: 1 tab(s) orally once a day (03 Apr 2022 06:34)  aspirin 81 mg oral delayed release tablet: 1 tab(s) orally once a day (03 Apr 2022 06:34)  bumetanide 2 mg oral tablet: 1 tab(s) orally once a day (03 Apr 2022 06:34)  insulin glargine 100 units/mL subcutaneous solution: 25 unit(s) subcutaneous once a day (at bedtime) (03 Apr 2022 06:34)  metOLazone 5 mg oral tablet: 1 tab(s) orally 3 times a week (03 Apr 2022 06:34)  metoprolol succinate 50 mg oral tablet, extended release: 2 tab(s) orally once a day (03 Apr 2022 06:34)  NovoLOG 100 units/mL subcutaneous solution: subcutaneous 4 times a day (before meals and at bedtime) (03 Apr 2022 06:34)  NovoLOG FlexPen 100 units/mL injectable solution: 10 unit(s) subcutaneous 3 times a day (03 Apr 2022 06:34)  oxycodone-acetaminophen 5 mg-325 mg oral tablet: 1 tab(s) orally 2 times a day (03 Apr 2022 06:34)  Pradaxa 150 mg oral capsule: 1 cap(s) orally 2 times a day (03 Apr 2022 06:34)  pregabalin 100 mg oral capsule: 1 cap(s) orally 3 times a day (03 Apr 2022 06:34)      MEDICATIONS  (STANDING):  allopurinol 100 milliGRAM(s) Oral daily  apixaban 5 milliGRAM(s) Oral two times a day  aspirin  chewable 81 milliGRAM(s) Oral daily  atorvastatin 40 milliGRAM(s) Oral at bedtime  chlorhexidine 4% Liquid 1 Application(s) Topical <User Schedule>  erythromycin   Ointment 1 Application(s) Right EYE at bedtime  influenza  Vaccine (HIGH DOSE) 0.7 milliLiter(s) IntraMuscular once  insulin lispro (ADMELOG) corrective regimen sliding scale   SubCutaneous every 6 hours  lidocaine   4% Patch 1 Patch Transdermal daily  metoprolol tartrate 25 milliGRAM(s) Oral every 6 hours  Nephro-reena 1 Tablet(s) Oral daily  ofloxacin 0.3% Solution 1 Drop(s) Right EYE four times a day  pantoprazole Infusion 8 mG/Hr (10 mL/Hr) IV Continuous <Continuous>  povidone iodine 10% Solution 1 Application(s) Topical daily  sucralfate 1 Gram(s) Oral every 12 hours  trifluridine 1% Solution 1 Drop(s) Right EYE every 3 hours      TELEMETRY: afiv hr 90-100s 	    ECG:  	  RADIOLOGY:   DIAGNOSTIC TESTING:  [ ] Echocardiogram:  [ ]  Catheterization:  [ ] Stress Test:    OTHER: 	    LABS:	 	    Troponin T, High Sensitivity Result: 226 ng/L [0 - 51] (05-31 @ 02:28)                          8.7    14.03 )-----------( 234      ( 02 Jun 2022 22:44 )             28.0     06-02    137  |  98  |  15  ----------------------------<  110<H>  3.6   |  28  |  3.01<H>    Ca    8.3<L>      02 Jun 2022 22:44  Phos  2.4     06-02  Mg     2.0     06-02

## 2022-06-03 NOTE — SWALLOW BEDSIDE ASSESSMENT ADULT - ADDITIONAL RECOMMENDATIONS
Swallow:   1. Pt/family/caregiver will demonstrate understanding and carryover of dysphagia management (safe swallow guidelines, compensatory strategies, dysphagia diet).  2.  Pt will tolerate recommended diet with no overt, clinical s/s of aspiration.
Maintain good oral hygiene  Service will f/u to re-asses swallow function pending improvement in overall clinical status and agitation

## 2022-06-03 NOTE — CONSULT NOTE ADULT - ASSESSMENT
Assessment and Recommendations:  67y male with a past medical history/ocular history of CAD, ESRD on HD, T2DM, HTN, cardiomyopathy, CVA, Afib, carotid stenosis consulted for right eye pain, found to have epi defect representing dendritic vs pseudodendritic ulceration suggestive of HSV vs VZV keratitis. Patient exam overall difficult to assess due to altered mental status. PERRLA, with soft to palpation globes. Patient has increased discomfort in the right eye, particularly in the lids. Erythema and edema of the right periorbital region, with mucous discharge. Fluorescein stain shows significant dendritic vs pseudodendritic epi defect. Patient does not have at this time specific vesicular or ulcerative lesions around the eye or the skin on the face. Patient has some erythema in the frontal head area.    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, right eye  - patient with a classic dendritic ulcer pattern suggestive of HSV keratitis    -- dendrites differential can include, due to pseudodendrite pattern, VZV keratitis   - patient IOP soft to palpation, noncompliant with IOP check with geo pen  - unable to assess visual acuity or remainder subjective exam due to patient cooperation  - anterior exam minor injection, mucous discharge as well  - no vesicular or ulcerative lesions noted around the eye on skin  - no necrosis noted on DFE, with red reflex present in all four quadrants of the right eye  - recommend starting Zirgan (ganciclovir) 0.15% ophthalmic gel 5 times a day or trifluridine 1% drops nine times per day if gel is unavailable   - recommend starting Ocuflox (ofloxacin) drops 4 times a day to prevent superinfection  - recommend starting erythromycin ointment 1 time at night to prevent superinfection  - recommend Valtrex 500mg PO TID  - avoid steroid drops in the eyes  - recommend warm or cool soaks to skin lesions should they develop TID or PRN  - will be followed by opthalmology while inpatient  - please reach out if worsening of symptoms    Seen and discussed with Bryan Bruce, PGY3.    Outpatient Follow-up: Patient should follow-up with his/her ophthalmologist or with [facility] Department of Ophthalmology within 1 week of after discharge at:    [address]  [place] [address] [phone]    Dimitrios Yu MD, PGY1  Also available on [employer] Teams

## 2022-06-03 NOTE — PROGRESS NOTE ADULT - NS ATTEND AMEND GEN_ALL_CORE FT
Agree with above note. Briefly, Pt. is a 67M w/ prolonged hospital course on medicine service iso renal failure req dialysis c/b b/l hypoperfusions strokes from R ICA stenosis 70-80% and L ICA occlusion (prior CEA) planned for BOOKER stent but course c/b acute uremia , GIB (melena) , and additional hypoperfusion episode and xfer to nscu.    episodes of melena 5/30-5/31  off PLavix since 5/31, IV PPI BID started 5/31; remains on ASA and Apixaban (AF) in setting of new strokes and L ICA occlusion    Current active GI issues are :  #acute GI blood loss anemia 2/2 bleeding Duodenal ulcer, erosive gastritis (non bleeding), duodenitis and additional non bleeding cratered DU  s/p EGD 6/1/22: Grade C esophagitis, non bleeding erosive gastropathy, duodenitis, Non obstructing bleeding duodenal ulcer (2nd portion, 10mm in largest diameter) with active bleeding - endoclip x4 placed with control of bleeding, additional 6mm non bleeding clean based duodenal ulcer (2nd portion)  High risk for recurrent GI bleeding on triple therapy (DAPT + AC)      -IF has recurrent GI bleeding with HD instability, will need to consult IR for angio +/- embolization; no further endoscopic rx options available  -continue PPI gtt x 72 hours, then transition to IV BID PPI  -continue carafate (grade C esophagitis)  -transfuse PRN for goal Hgb >8 (given recent cardiac PCI and known L ICA occlusion with hypoperfusion CVAs)  -expect next few BMs to be dark 2/2 passage of "old"/residual blood from GI tract  -continue to hold laxatives for now as blood is a GI irritant cathartic; monitor BMs. If no BM in >24 hours, then can start Miralax via NGT  -no GI objection to increasing NGTF to goal rate  - will need close monitoring for recurrent GI Bleeding.    Good Braun MD  [facility]

## 2022-06-03 NOTE — SWALLOW BEDSIDE ASSESSMENT ADULT - SWALLOW EVAL: ORAL MUSCULATURE
unable to assess due to poor participation/comprehension
Unable to complete full oral motor exam given pt confusion and agitation toward following directives

## 2022-06-03 NOTE — PROGRESS NOTE ADULT - ASSESSMENT
67M w/ prolonged hospital course on medicine service iso renal failure req dialysis c/b b/l hypoperfusions strokes from R ICA stenosis 70-80% and L ICA occlusion (prior CEA) planned for BOOKER stent but course c/b acute uremia , GIB (melena) , and additional hypoperfusion episode and xfer to nscu.    episodes of melena 5/30-5/31  off PLavix since 5/31, IV PPI BID started 5/31; remains on ASA and Apixaban (AF) in setting of new strokes and L ICA occlusion    #acute GI blood loss anemia 2/2 bleeding Duodenal ulcer, erosive gastritis (non bleeding), duodenitis and additional non bleeding cratered DU  s/p EGD 6/1/22: Grade C esophagitis, non bleeding erosive gastropathy, duodenitis, Non obstructing bleeding duodenal ulcer (2nd portion, 10mm in largest diameter) with active bleeding - endoclip x4 placed with control of bleeding, additional 6mm non bleeding clean based duodenal ulcer (2nd portion)  High risk for recurrent GI bleeding on triple therapy (DAPT + AC)    -IF has recurrent GI bleeding with HD instability, will need to consult IR for angio +/- embolization; no further endoscopic rx options available  -continue PPI gtt x 72 hours, then transition to IV BID PPI  -continue carafate (grade C esophagitis)  -transfuse PRN for goal Hgb >8 (given recent cardiac PCI and known L ICA occlusion with hypoperfusion CVAs)  -expect next few BMs to be dark 2/2 passage of "old"/residual blood from GI tract  -continue to hold laxatives for now as blood is a GI irritant cathartic; monitor BMs. If no BM in >24 hours, then can start Miralax via NGT  -no GI objection to increasing NGTF to goal rate  -Given Hgb/HD stability and no transfusion requirement in >24 hours with pt requiring full dose AC 2/2 AF with hypoperfusion CVA and ICA stenosis; can increase to full AC dose and monitor.  If re-bleeds, will have to re-assess risk vs benefit of AC and consult IR team for angio/embolization given no further endoscopic therapeutic options available    Discussed with NSCU team and attending  Please call over weekend prn with acute GI concerns   GI service : [phone]    Nicolas Coleman PA-C    [facility]  [phone]  After hours and weekend coverage [phone]

## 2022-06-03 NOTE — CONSULT NOTE ADULT - SUBJECTIVE AND OBJECTIVE BOX
SUBJECTIVE:   more awake today, right eye is cloased      Medications:  acetaminophen     Tablet .. 650 milliGRAM(s) Oral every 6 hours PRN  allopurinol 100 milliGRAM(s) Oral daily  apixaban 5 milliGRAM(s) Oral two times a day  aspirin  chewable 81 milliGRAM(s) Oral daily  atorvastatin 40 milliGRAM(s) Oral at bedtime  chlorhexidine 4% Liquid 1 Application(s) Topical <User Schedule>  erythromycin   Ointment 1 Application(s) Right EYE at bedtime  influenza  Vaccine (HIGH DOSE) 0.7 milliLiter(s) IntraMuscular once  insulin lispro (ADMELOG) corrective regimen sliding scale   SubCutaneous every 6 hours  lidocaine   4% Patch 1 Patch Transdermal daily  metoprolol tartrate 25 milliGRAM(s) Oral every 6 hours  Nephro-reena 1 Tablet(s) Oral daily  ofloxacin 0.3% Solution 1 Drop(s) Right EYE four times a day  pantoprazole Infusion 8 mG/Hr IV Continuous <Continuous>  povidone iodine 10% Solution 1 Application(s) Topical daily  sucralfate 1 Gram(s) Oral every 12 hours  trifluridine 1% Solution 1 Drop(s) Right EYE every 3 hours      Labs:  CBC Full  -  ( 02 Jun 2022 22:44 )  WBC Count : 14.03 K/uL  RBC Count : 3.24 M/uL  Hemoglobin : 8.7 g/dL  Hematocrit : 28.0 %  Platelet Count - Automated : 234 K/uL  Mean Cell Volume : 86.4 fl  Mean Cell Hemoglobin : 26.9 pg  Mean Cell Hemoglobin Concentration : 31.1 gm/dL  Auto Neutrophil # : x  Auto Lymphocyte # : x  Auto Monocyte # : x  Auto Eosinophil # : x  Auto Basophil # : x  Auto Neutrophil % : x  Auto Lymphocyte % : x  Auto Monocyte % : x  Auto Eosinophil % : x  Auto Basophil % : x    06-02    137  |  98  |  15  ----------------------------<  110<H>  3.6   |  28  |  3.01<H>    Ca    8.3<L>      02 Jun 2022 22:44  Phos  2.4     06-02  Mg     2.0     06-02      CAPILLARY BLOOD GLUCOSE      POCT Blood Glucose.: 141 mg/dL (03 Jun 2022 05:58)  POCT Blood Glucose.: 130 mg/dL (03 Jun 2022 00:26)  POCT Blood Glucose.: 102 mg/dL (02 Jun 2022 17:45)  POCT Blood Glucose.: 130 mg/dL (02 Jun 2022 11:53)              Vitals:  Vital Signs Last 24 Hrs  T(C): 36.8 (03 Jun 2022 07:00), Max: 37.1 (02 Jun 2022 19:00)  T(F): 98.3 (03 Jun 2022 07:00), Max: 98.8 (02 Jun 2022 19:00)  HR: 100 (03 Jun 2022 08:00) (78 - 130)  BP: --  BP(mean): --  RR: 22 (03 Jun 2022 08:00) (15 - 33)  SpO2: 100% (03 Jun 2022 08:00) (95% - 100%)    NEUROLOGICAL EXAM:    Mental status: awake alert answer questions or follow commands  calm    Cranial Nerves: Pupils were equal, round, reactive to light. Extraocular movements were intact. B BTT Facial sensation was intact to light touch. There was no facial asymmetry.    right eye closed    Motor exam: Bulk and tone were normal. moves all ext    Reflexes: DTRs depressed, flexor plantars.     Sensation: Intact to noxious,    Coordination: no gross dysmetria    Gait: deferred    NIHSS: 20    Imaging:    ACC: [id number] EXAM:  CT BRAIN                        PROCEDURE DATE:  04/09/2022      INTERPRETATION:  CLINICAL INFORMATION:  Altered mental status, on   anticoagulation .    TECHNIQUE: Multiple contiguous axial images were acquired from the   skullbase to the vertex without the administration of intravenous   contrast.  Coronal and sagittal reformations were made.    COMPARISON: CT head 10/21/2021, brain MRI 02/23/2014.    FINDINGS:    Scattered lacunar infarcts in the bilateral cerebralhemispheres are   grossly stable compared to the 10/21/2021 head CT. No new additional   infarcts are noted over the time interval.    No acute intracranial hemorrhage, mass effect, or midline shift. The   brain demonstrates periventricular hypoattenuation, nonspecific in   etiology but likely representing chronic microvascular ischemic changes.    No abnormal extra-axial fluid collections are present.    The ventricles and sulci demonstrate age appropriate involutional   changes.  The basal cisterns are patent.    The orbits are unremarkable. The paranasal sinuses are clear. The mastoid   air cells are clear. The calvarium is intact.    IMPRESSION:    No acute intracranial abnormality is noted. If the patient has new and   persistent symptoms, consider short interval follow-up head CT or brain   MRI.    --- End of Report ---    GATITO VILLARREAL MD; Resident Radiologist  This document has been electronically signed.  JESSE CORONA MD; Attending Radiologist  This document has been electronically signed. Apr 9 2022  2:00PM        ACC: [id number] EXAM:  MR BRAIN                          PROCEDURE DATE:  05/11/2022          INTERPRETATION:  MR brain  without gadolinium    CLINICAL INFORMATION: Stroke.    TECHNIQUE: Limited Sagittal T1-weighted images, axial FLAIR images, and   axial diffusion weighted images of the brain were obtained.  Patient was   unable to hold still for remaining sequences.    FINDINGS:   MRI dated 02/23/2014 available for review.    The brain demonstrates small acute infarctions in the BILATERAL posterior   centrum semiovale and LEFT corona radiata and LEFT posterior   periventricular white matter with restricted diffusion. No intracranial   hemorrhage is recognized. No mass effect is found in the brain.    The ventricles, sulci and basal cisterns show mild global atrophy most   prominent within the BILATERAL cerebellum.    The vertebral and internal carotid arteries demonstrate expected flow   voids indicating their patency.    The orbits are unremarkable.  The paranasal sinuses are clear.  The nasal   cavity appears intact.  The nasopharynx is symmetric.  The central skull   base and temporal bones are intact.  The calvarium appears unremarkable.    IMPRESSION: Small acute infarctions in the BILATERAL posterior centrum   semiovale and LEFT corona radiata and LEFT posterior periventricular   white matter with restricted diffusion.   mild global atrophy most   prominent within the BILATERAL cerebellum.    --- End of Report ---            JESÚS PADGETT MD; Attending Radiologist  This document has been electronically signed. May 11 2022  5:47PM          < from: CT Angio Head w/ IV Cont (05.12.22 @ 13:34) >    ACC: [id number] EXAM:  CT ANGIO NECK (W)AW IC                        ACC: [id number] EXAM:  CT ANGIO BRAIN (W)AW IC                          PROCEDURE DATE:  05/12/2022          INTERPRETATION:  CT angiography of the brain and neck    CLINICAL INDICATION: Recent infarcts. Carotid stenosis.    TECHNIQUE: Direct axial CT scanning of the brain and neck was obtained   from the vertex to the level of the clavicular heads after the dynamic   intravenous injection of 44 cc of Omnipaque 300. 0 cc were discarded.   Sagittal and coronal maximum intensity projection reformats were   provided.  Three-dimensional reconstructions were performed by the   radiologist using the [employer] workstation.    Additionally, noncontrast axial CT scanning of the brain was obtained   from the skull base to the vertex. Sagittal and coronal reformats were   provided.    COMPARISON: MRA neck 10/24/2021    FINDINGS:    CT brain:    Previously seen acute infarcts in the bilateral cerebral hemispheres are   redemonstrated. No CT evidence for hemorrhagic transformation.    No hydrocephalus, midline shift, or effacement of basal cisterns.    No acute intracranial hemorrhage or vasogenic edema.    Mild to moderate white matter microvascular ischemic disease.    CTA brain:    Limited by relatively decreased opacification of the arteries.    Multifocal narrowing of the right skull base ICA due to calcified plaque,   the degree of which is not well evaluated.    Normal flow-related enhancement of the supraclinoid right ICA.    Lack of flow related enhancement of the petrous, precavernous, and   cavernous left ICA. Reconstitution of the vessel at its supraclinoid   segment.    Otherwise no flow-limiting stenosis.    Normal flow-related enhancement of the bilateral anterior, middle, and   posterior cerebral arteries.    Normal flow-related enhancement of the intradural vertebral arteries and   basilar artery.    No vascular aneurysm or AVM.    CTA neck:    Stenoses described in this report are on the basis of NASCET criteria.    Study is significantly limited by relatively decreased opacification of   the arteries.    Short segment stenosis of the mid left common carotid artery due to the   presence of partially calcified plaque is poorly evaluated. Flow-related   enhancement of the more proximal and distal left common carotid artery is   noted.    Long segment stenosis of the mid and distal right common carotid artery   due to the presence of partially calcified plaque is poorly evaluated.    Rapidly progressive stenosis of the left internal carotid artery   beginning at its origin. No flow related enhancement of the vessel beyond   its origin.    Normal flow-related enhancement of the bilateral vertebral arteries.    No gross evidence for acute arterial dissection.    IMPRESSION:    CTA brain:  Limited by relatively decreased opacification of the arteries.    Multifocal narrowing of the right skull base ICA due to calcified plaque,   the degree of which is not well evaluated.    Lack of flow related enhancement of the petrous, precavernous, and   cavernous left ICA. Reconstitution of the vessel at its supraclinoid   segment.    Otherwise no flow-limiting stenosis.    No vascular aneurysm or AVM.    CTA neck:  Limited by relatively decreased opacification of the arteries.    Short segment stenosis of the mid left common carotid artery due to the   presence of partially calcified plaque is poorly evaluated. Flow-related   enhancement of the more proximal and distal left common carotid artery is   noted.    Long segment stenosis of the mid and distal right common carotid artery   due to the presence of partially calcified plaque is poorly evaluated.    Rapidly progressive stenosis of the left internal carotid artery   beginning at its origin. No flow related enhancement of the vessel beyond   its origin.    Recommend correlation with contrast-enhanced MRI of the neck for further   evaluation.    --- End of Report ---            MIGUEL ANGEL CARRILLO MD; Attending Radiologist  This document has been electronically signed. May 12 2022  2:47PM    < end of copied text >      < from: MR Head No Cont (05.31.22 @ 08:42) >    ACC: [id number] EXAM:  MR BRAIN                          PROCEDURE DATE:  05/31/2022          INTERPRETATION:  Clinical history: Code stroke.    Limited MRI was performed using axial diffusion and susceptibility   weighted sequence as well as axial T2-weighted sequence. This exam is   compared with prior brain MRI performed on May 11, 2022.    Scattered areas of abnormal T2 prolongation with restricted diffusion is   seen involving the bilateral corona radiata and centrum semiovale region   as well as the left periatrial region. These findings compatible with   areas of acute infarcts. These findings could be secondary to systemic   etiology. Please correlate clinically.    Parenchymal volume loss and chronic microvascular ischemic changes are   identified    Multiple inflammatory changes seen involving the right mastoid.    The visualized paranasal sinuses mastoid and middle ear regions appear   clear.    IMPRESSION: Acute infarcts as described above.    --- End of Report ---            TOMASA BUTLER MD; Attending Radiologist  This document has been electronically signed. May 31 2022  9:06AM    < end of copied text >

## 2022-06-03 NOTE — DISCHARGE NOTE NURSING/CASE MANAGEMENT/SOCIAL WORK - NSDCPEFALRISK_GEN_ALL_CORE
For information on Fall & Injury Prevention, visit: [email or web address] OR  [email or web address] OR  https://www.cdc.gov/steadi/patient.html

## 2022-06-03 NOTE — PROGRESS NOTE ADULT - ASSESSMENT
67M PMH of CAD, NSTEMI s/p 3 stents in 2014 (stents to LAD, proximal and distal LCx), ischemic cardiomyopathy, HTN for 10 years, T2DM for 26 years c/b peripheral neuropathy, CVA, TIA, Afib on Pradaxa, chronic RLE wound, L carotid stenosis s/p endarterectomy (2014) just recently admitted  to the [facility] on 2/19/2022 with acute onset chest pain for one day found to have an NSTEMI, CAD, s/p PCI in setting of increased AF rates off bb for 3 days and resolved chest pain, his CKMB peaked and Echo noted with EF 60%,normal LV function, mild TR, mild TN. He was s/p [facility] with 85% proximal LAD s/p balloon angioplasty and LULU. He presented to [facility] ED with decreased urine output over the week. Carotid duplex showed Calcified atherosclerotic disease seen throughout the carotid systems. There is new occlusion of the left internal carotid artery. There is greater than 70% stenosis involving the distal right common carotid artery as was seen previously. Mild to moderate flow-limiting stenosis is seen at the left external carotid artery. Vascular surgery consulted for carotid stenosis. Pt currently denies vision changes, dizziness, lightheadedness or any other symptoms at this point in time.     PLAN:  d/w pt's wife at bedside   no additional questions  awaiting  decision   will follow

## 2022-06-04 NOTE — CHART NOTE - NSCHARTNOTEFT_GEN_A_CORE
Spoke with Dr. Pinto of Vascular on 6/4 @ 6:30pm about how patient's family would like to move forward with CEA procedure.

## 2022-06-04 NOTE — PROGRESS NOTE ADULT - SUBJECTIVE AND OBJECTIVE BOX
[facility]   [phone]  RENAL FOLLOW UP NOTE  --------------------------------------------------------------------------------  Chief Complaint:    24 hour events/subjective:    seen earlier   family at bedside   still lethargic but recalls family name     PAST HISTORY  --------------------------------------------------------------------------------  No significant changes to PMH, PSH, FHx, SHx, unless otherwise noted    ALLERGIES & MEDICATIONS  --------------------------------------------------------------------------------  Allergies    nitrofurantoin (Nephrotoxicity)    Intolerances      Standing Inpatient Medications  allopurinol 100 milliGRAM(s) Oral daily  apixaban 5 milliGRAM(s) Oral two times a day  aspirin  chewable 81 milliGRAM(s) Oral daily  atorvastatin 40 milliGRAM(s) Oral at bedtime  chlorhexidine 4% Liquid 1 Application(s) Topical <User Schedule>  erythromycin   Ointment 1 Application(s) Right EYE at bedtime  influenza  Vaccine (HIGH DOSE) 0.7 milliLiter(s) IntraMuscular once  insulin lispro (ADMELOG) corrective regimen sliding scale   SubCutaneous every 6 hours  insulin NPH human recombinant 6 Unit(s) SubCutaneous every 6 hours  lidocaine   4% Patch 1 Patch Transdermal daily  metoprolol tartrate 50 milliGRAM(s) Oral two times a day  Nephro-reena 1 Tablet(s) Oral daily  ofloxacin 0.3% Solution 1 Drop(s) Right EYE four times a day  pantoprazole  Injectable 40 milliGRAM(s) IV Push every 12 hours  povidone iodine 10% Solution 1 Application(s) Topical daily  sucralfate 1 Gram(s) Oral every 12 hours  trifluridine 1% Solution 1 Drop(s) Right EYE every 3 hours    PRN Inpatient Medications  acetaminophen     Tablet .. 650 milliGRAM(s) Oral every 6 hours PRN      REVIEW OF SYSTEMS  --------------------------------------------------------------------------------        VITALS/PHYSICAL EXAM  --------------------------------------------------------------------------------  T(C): 37.1 (06-04-22 @ 15:00), Max: 37.2 (06-04-22 @ 11:00)  HR: 128 (06-04-22 @ 18:00) (97 - 149)  BP: --  RR: 23 (06-04-22 @ 18:00) (16 - 24)  SpO2: 100% (06-04-22 @ 18:00) (93% - 100%)  Wt(kg): --        06-03-22 @ 07:01  -  06-04-22 @ 07:00  --------------------------------------------------------  IN: 1755 mL / OUT: 0 mL / NET: 1755 mL    06-04-22 @ 07:01  -  06-04-22 @ 19:53  --------------------------------------------------------  IN: 620 mL / OUT: 0 mL / NET: 620 mL      Physical Exam:  	             Gen: lethargic  	Pulm: Decreased breath sounds b/l bases.  	CV: No JVD. IRR,  	Abd: +BS, soft, nontender, softly distended, obese, +NGT               Extremity no edema              Extremity LE: + hyperpigmented bilateral LE with no edema              Vascular: R IJ HD catheter, L arm AVF       LABS/STUDIES  --------------------------------------------------------------------------------              7.8    12.94 >-----------<  266      [06-04-22 @ 13:29]              25.6     x   |  x   |  17  ----------------------------<  x       [06-04-22 @ 17:58]  x    |  x   |  x         Ca     8.5     [06-03-22 @ 21:41]      Mg     2.0     [06-03-22 @ 21:41]      Phos  3.1     [06-03-22 @ 21:41]            Creatinine Trend:  SCr 4.50 [06-03 @ 21:41]  SCr 3.01 [06-02 @ 22:44]  SCr 5.46 [06-01 @ 22:06]  SCr 3.41 [05-31 @ 22:52]  SCr 6.99 [05-31 @ 02:28]                  Iron 21, TIBC 245, %sat 9      [05-17-22 @ 05:54]  Ferritin 120      [05-17-22 @ 05:54]  PTH -- (Ca 8.1)      [06-02-22 @ 15:14]   75  PTH -- (Ca 8.1)      [05-28-22 @ 13:06]   96  PTH -- (Ca 8.5)      [05-24-22 @ 14:22]   84  PTH -- (Ca 8.3)      [04-15-22 @ 12:18]   150  PTH -- (Ca --)      [04-03-22 @ 23:06]   97  HbA1c 11.7      [11-07-19 @ 09:14]  TSH 1.71      [05-12-22 @ 07:11]

## 2022-06-04 NOTE — PROGRESS NOTE ADULT - SUBJECTIVE AND OBJECTIVE BOX
CARDIOLOGY FOLLOW UP - Dr. Mazariegos  Date of Service: 6/4/22  CC: no events, awake, no complaints, afib 100-110 on tele    Review of Systems:  Constitutional: No fever, weight loss, or fatigue  Respiratory: No cough, wheezing, or hemoptysis, no shortness of breath  Cardiovascular: No chest pain, palpitations, passing out, dizziness, or leg swelling  Gastrointestinal: No abd or epigastric pain. No nausea, vomiting, or hematemesis; no diarrhea or consiptaiton, no melena or hematochezia  Vascular: No edema     TELEMETRY:    PHYSICAL EXAM:  T(C): 37.1 (06-04-22 @ 03:00), Max: 37.1 (06-04-22 @ 03:00)  HR: 117 (06-04-22 @ 05:00) (97 - 128)  BP: --  RR: 16 (06-04-22 @ 05:00) (16 - 28)  SpO2: 100% (06-04-22 @ 05:00) (88% - 100%)  Wt(kg): --  I&O's Summary    03 Jun 2022 07:01  -  04 Jun 2022 07:00  --------------------------------------------------------  IN: 1755 mL / OUT: 0 mL / NET: 1755 mL        Appearance: Normal	  Cardiovascular: Normal S1 S2,RRR, No JVD, No murmurs  Respiratory: Lungs clear to auscultation	  Gastrointestinal:  Soft, Non-tender, + BS	  Extremities: Normal range of motion, No clubbing, cyanosis or edema  Vascular: Peripheral pulses palpable 2+ bilaterally       Home Medications:  allopurinol 100 mg oral tablet: 1 tab(s) orally once a day (03 Apr 2022 06:34)  aspirin 81 mg oral delayed release tablet: 1 tab(s) orally once a day (03 Apr 2022 06:34)  bumetanide 2 mg oral tablet: 1 tab(s) orally once a day (03 Apr 2022 06:34)  insulin glargine 100 units/mL subcutaneous solution: 25 unit(s) subcutaneous once a day (at bedtime) (03 Apr 2022 06:34)  metOLazone 5 mg oral tablet: 1 tab(s) orally 3 times a week (03 Apr 2022 06:34)  metoprolol succinate 50 mg oral tablet, extended release: 2 tab(s) orally once a day (03 Apr 2022 06:34)  NovoLOG 100 units/mL subcutaneous solution: subcutaneous 4 times a day (before meals and at bedtime) (03 Apr 2022 06:34)  NovoLOG FlexPen 100 units/mL injectable solution: 10 unit(s) subcutaneous 3 times a day (03 Apr 2022 06:34)  oxycodone-acetaminophen 5 mg-325 mg oral tablet: 1 tab(s) orally 2 times a day (03 Apr 2022 06:34)  Pradaxa 150 mg oral capsule: 1 cap(s) orally 2 times a day (03 Apr 2022 06:34)  pregabalin 100 mg oral capsule: 1 cap(s) orally 3 times a day (03 Apr 2022 06:34)        MEDICATIONS  (STANDING):  allopurinol 100 milliGRAM(s) Oral daily  apixaban 5 milliGRAM(s) Oral two times a day  aspirin  chewable 81 milliGRAM(s) Oral daily  atorvastatin 40 milliGRAM(s) Oral at bedtime  chlorhexidine 4% Liquid 1 Application(s) Topical <User Schedule>  erythromycin   Ointment 1 Application(s) Right EYE at bedtime  influenza  Vaccine (HIGH DOSE) 0.7 milliLiter(s) IntraMuscular once  insulin lispro (ADMELOG) corrective regimen sliding scale   SubCutaneous every 6 hours  insulin NPH human recombinant 2 Unit(s) SubCutaneous every 6 hours  lidocaine   4% Patch 1 Patch Transdermal daily  metoprolol tartrate 25 milliGRAM(s) Oral every 6 hours  Nephro-reena 1 Tablet(s) Oral daily  ofloxacin 0.3% Solution 1 Drop(s) Right EYE four times a day  pantoprazole Infusion 8 mG/Hr (10 mL/Hr) IV Continuous <Continuous>  povidone iodine 10% Solution 1 Application(s) Topical daily  sucralfate 1 Gram(s) Oral every 12 hours  trifluridine 1% Solution 1 Drop(s) Right EYE every 3 hours        EKG:  RADIOLOGY:  DIAGNOSTIC TESTING:  [ ] Echocardiogram:  [ ] Catherterization:  [ ] Stress Test:  OTHER:     LABS:	 	                          8.4    11.85 )-----------( 256      ( 04 Jun 2022 06:07 )             28.1     06-03    135  |  98  |  25<H>  ----------------------------<  205<H>  3.8   |  26  |  4.50<H>    Ca    8.5      03 Jun 2022 21:41  Phos  3.1     06-03  Mg     2.0     06-03            CARDIAC MARKERS:

## 2022-06-04 NOTE — RAPID RESPONSE TEAM SUMMARY - NSADDTLFINDINGSRRT_GEN_ALL_CORE
Upon arrival the patient was noted to be lethargic with response only to pain. 's temp noted to be 101 rectally with respiratory rate of 20-26 hrt rate noted to be 120's.    Upon arrival the patient was noted to be lethargic with response only to pain. 's temp noted to be 101 rectally with respiratory rate of 20-26 hrt rate noted to be 120's. (+) moderate secretions with wet cough noted.

## 2022-06-04 NOTE — PROGRESS NOTE ADULT - ASSESSMENT
Echo 5/13/22:  1. Normal left ventricular internal dimensions and wall  thicknesses.  2. Endocardial visualization enhanced with intravenous  injection of Ultrasonic Enhancing Agent (Lumason). Grossly  borderline normal left ventricular systolic function; no  obvious segmental wall motion abnormality.  3. The right ventricle is not well visualized.  *** Compared with echocardiogram of 2/22/2022, no  significant changes noted.      A/P    68 y/o M with history of CAD, s/p multiple PCI, with most recent 2/22 PCI of LAD in setting of NSTEMI, on ASA only (pradaxa), chronic atrial fibrillation maintained on Pradaxa, essential HTN, type 2 DM previously on oral medications but on insulin, diabetic neuropathy with chronic RIGHT LE venous stasis changes>left, LEFT foot ulcer seen by podiatry, past endarterectomy LEFT CEA in 2014 presenting with 1 week of decreased urine output, cystitis with hematuria     #ANT on CKD, new HD  -oliguric renal failure in setting of UTI/cystitis  -New HD for uremia, renal failure  -s/p L AVG 4/18, s/p permacath placement 4/20  -renal f/ u    #AMS/Lethargy  -ams likely from pain meds, embolic cva  -repeat CT 4/27 unremarkable  -MRI 5/11 revealed small acute infarctions in bilateral posterior centrum semiovale and left corona radiata and left posterior periventricular white matter  -repeat echo with no interval changes  -sp RRT 5/3- repeat imaging noted - transferred to NSICU  -neuro following       #Acute on chronic HFpEF  -stable  -continue volume removal with HD  -c/w bb  -repeat echo with stable borderline lv fxn    #Chronic AF  -rates mildly elevated  -ccb on hold  -BB adjustment  per NSICU   -c/w eliquis    #HTN  -stable  -c/w meds per NSICU    #CAD, s/p PCI, most recent 2/2022  -c/w ac, asa  -plavix on hold per neurosx   -statin     #carotid stenosis   -previous MRA  noted with Greater than 75% stenosis of the right distal common carotid artery. Approximately 60% stenosis of the proximal left internal carotid artery.  -carotid dopplers 5/11 noted :  There is new occlusion of the left internal carotid artery;  greater than 70% stenosis involving the distal right common carotid artery as was seen previously. Mild to moderate flow-limiting stenosis is seen at the left external   carotid artery.  -CTa head and neck 5/12 noted  -in light of gi bleed on triple therapy, carotid stenting deferred for now due to concern ability to treat post with DAP   -await possible CEA- patient remains stable from cardiac standpoint and can proceed for CEA with acceptable cardiac risk   -biggest concern for CEA would be neuro risk of atiya/post op CVA/IVH in light of overall status, recent cva on imaging, carotid disease burden, unilateral occlusion of ica and likely diffuse cerebrovascular disease   -holding plavix, cont ASA/eliquis for afib  -family to decide on any potential cea    #anemia  -sp egd 6/1 Grade C esophagitis, non bleeding erosive gastropathy, duodenitis, Non obstructing bleeding duodenal ulcer (2nd portion, 10mm in largest diameter) with active bleeding - endoclip x4 placed with control of bleeding, additional 6mm non bleeding clean based duodenal ulcer (2nd portion)  -on ppi gtt, sp PRBC , trend cbc   -GI fu     35 minutes spent on total encounter; more than 50% of the visit was spent counseling and/or coordinating care by the attending physician.

## 2022-06-04 NOTE — PROGRESS NOTE ADULT - ATTENDING COMMENTS
no longer critically ill.  maintaining sat on room air.  renal following for ongoing dialysis.  OK to transfer out of ICU to medicine service.

## 2022-06-04 NOTE — PROGRESS NOTE ADULT - ASSESSMENT
67 year old gentleman with PMH of CAD, NSTEMI s/p 3 stents in 2014 (stents to LAD, proximal and distal LCx), ischemic cardiomyopathy, HTN for 10 years, T2DM for 26 years c/b peripheral neuropathy, CVA, TIA, Afib on Pradaxa, chronic RLE wound, L carotid stenosis s/p endarterectomy (2014) just recently admitted  to the [facility] on 2/19/2022 with acute onset chest pain for one day found to have an NSTEMI, CAD, s/p PCI in setting of increased AF rates off bb for 3 days and resolved chest pain, his CKMB peaked and Echo noted with EF 60%,normal LV function, mild TR, mild W[place]. He was s/p LHC with 85% proximal LAD s/p balloon angioplasty and LULU. He presented to [facility] ED with decreased urine output over the week. Mr. Stevens went to [facility] for hematuria and was started  on Nitrofurantoin. Pt is still taking Nitrofurantoin w/ 5 days left. He came to the ED due to worsening symptoms. Pt reports having a similar incident 4-5 years ago that resolved spontaneously. He reports increased leg edema Dyspnea worse on exertion. his baseline Serum creatinine is in the 2.0 to 2.3 mg/dl range. ANT with serum creatinine of 8.29 with bun 144 and k 5.5      1- ESRD   2- chf chronic   3- hyperphosphatemia /shpt   4- anemia   5- hyperlipidemia   6- HTN /a fib  7- TIA hx   8- hx of carotid stenosis  9- GIB      HD today  F200, 225  min, , , 0 L fluid removal   see HD flowsheet   anemia - required prbc  5/31  epogen [id number] Units TIW with HD.

## 2022-06-04 NOTE — PROGRESS NOTE ADULT - ASSESSMENT
ASSESSMENT/PLAN:    NEURO:  Neuro Checks Q 2 hr  ASA 81 mg OD  increase Eliquis to 5 mg OD  Not candidate for endovascular treatment   CEA    PULM:   RA    CV:   Afib, CAD  SBP goal 120-180  Atorvastatin 40 mg OD  Metoprolol 12.5mg bid with hold measures   Phenylephrine gtt  Eliquis 5 mg BID     RENAL:  Fluids: IVL   ESRD on HD    GI:  NGT, tube feeding  Melena  Gastritis, PUD post endoscopic clipping 06/01/2022  Consider angio and embolization in case of rebleeding   Pantoprazole gtt  Bowel regimen [] colace [x] senna [x] other: miralax    ENDO:   Goal euglycemia (-180)  ISS    HEME/ONC:  Monitor HH  VTE prophylaxis: [x] SCDs   Eliquis 5 mg BID    ID:   Monitor for fever   HSV Keratitis     SOCIAL/FAMILY:  [] awaiting [x] updated regarding transfer [] family meeting    CODE STATUS:  [x] Full Code [] DNR [] DNI [] Palliative/Comfort Care    DISPOSITION:  [x] ICU [] Stroke Unit [] Floor [] EMU [] RCU [] PCU    [x] Patient is at high risk of neurologic deterioration/death due to: sepsis due to unknown organism, hypotension, cerebral hypoperfusion, acute stroke, altered mental status, respiratory failure requiring intubation     Contact: [phone]   ASSESSMENT/PLAN:    NEURO:  Neuro Checks Q 2 hr  ASA 81 mg OD  Eliquis to 5 mg OD  Not candidate for endovascular treatment     PULM:   RA    CV:   Afib, CAD  SBP goal 120-180  Atorvastatin 40 mg OD  Metoprolol 12.5mg bid with hold measures   Phenylephrine gtt  Eliquis 5 mg BID     RENAL:  Fluids: IVL   ESRD on HD    GI:  NGT, tube feeding  Melena  Gastritis, PUD post endoscopic clipping 06/01/2022  Consider angio and embolization in case of rebleeding   Pantoprazole gtt  Bowel regimen [] colace [x] senna [x] other: miralax    ENDO:   Goal euglycemia (-180)  ISS    HEME/ONC:  Monitor HH  VTE prophylaxis: [x] SCDs   Eliquis 5 mg BID    ID:   Monitor for fever   HSV Keratitis     SOCIAL/FAMILY:  [] awaiting [x] updated regarding transfer [] family meeting    CODE STATUS:  [x] Full Code [] DNR [] DNI [] Palliative/Comfort Care    DISPOSITION:  [] ICU [] Stroke Unit [x] Floor [] EMU [] RCU [] PCU    [x] Patient is at high risk of neurologic deterioration/death due to: sepsis due to unknown organism, hypotension, cerebral hypoperfusion, acute stroke, altered mental status, respiratory failure requiring intubation     Contact: [phone]

## 2022-06-04 NOTE — RAPID RESPONSE TEAM SUMMARY - NSUNITLOCATIONRRT_GEN_ALL_CORE
Called and requested refill(s): Germain Bob  Medications: Amlodipine   Amount: 30 day supply  Pharmacy to be sent to: [facility] on [address] [facility]  Call back number: [phone]  Okay to leave detailed voice message: yes    
Refill Request  Last appointment:01/20/2020  Last Labs:yesterday  Next Appointment:07/02/2020  
6 tower

## 2022-06-04 NOTE — RAPID RESPONSE TEAM SUMMARY - NSOTHERINTERVENTIONSRRT_GEN_ALL_CORE
Please follow up with all cx  Please follow up with all cx and blood work   - Please continue with abt Piptazo until cx results   Vanco by level   The patient will need to be on aspiration precautions   chest PT q 4 and PRN   Nebs and suction please   HOB 30 degrees   Please bladder scan for possible UA / urine cx

## 2022-06-04 NOTE — PROGRESS NOTE ADULT - SUBJECTIVE AND OBJECTIVE BOX
HPI:  Patient remotely known to me from an admission to [facility] in Feb 2017--assigned to me at this point by the ER to admit this 66 y/o M--followed by his nephrologist above--patient with a history of CAD with past multiple PCI, with most recent discharge from [facility] in Feb 2022 for non ST elevation MI with LULU of an 85% prox LAD lesion with patient on ASA and now off Plavix per cardiology (only for one month), chronic atrial fibrillation maintained on Pradaxa, essential HTN, type 2 DM previously on oral medications but on insulin, diabetic neuropathy with chronic RIGHT LE venous stasis changes>left, LEFT foot ulcer seen by podiatry, past endarterectomy LEFT CEA in 2014. with patient self referring to the ER following apparently treatment by an urgent care center for an apparent cystitis with hematuria on nitrofurantoin but with patient noting decreased urine output for the past week, and difficulty with B/L coarse tremors for several weeks, with patient having difficulty negotiating his applications on his smart phone (observed by examiner).  Patient with increasing B/L LE oedema and weight gain, with patient with 2 pillow orthopnoea.      OVERNIGHT EVENTS:   No acute events overnight.    VITALS:  T(C): , Max: 36.8 (06-03-22 @ 07:00)  HR:  (81 - 128)  BP: --  ABP:  (136/47 - 174/69)  RR:  (16 - 28)  SpO2:  (88% - 100%)  Wt(kg): --      06-02-22 @ 07:01  -  06-03-22 @ 07:00  --------------------------------------------------------  IN: 780 mL / OUT: 500 mL / NET: 280 mL    06-03-22 @ 07:01  -  06-04-22 @ 06:32  --------------------------------------------------------  IN: 1455 mL / OUT: 0 mL / NET: 1455 mL      LABS:  Na: 135 (06-03 @ 21:41), 137 (06-02 @ 22:44), 137 (06-01 @ 22:06)  K: 3.8 (06-03 @ 21:41), 3.6 (06-02 @ 22:44), 4.8 (06-01 @ 22:06)  Cl: 98 (06-03 @ 21:41), 98 (06-02 @ 22:44), 99 (06-01 @ 22:06)  CO2: 26 (06-03 @ 21:41), 28 (06-02 @ 22:44), 23 (06-01 @ 22:06)  BUN: 25 (06-03 @ 21:41), 15 (06-02 @ 22:44), 11 (06-02 @ 19:34), 44 (06-02 @ 15:13), 40 (06-01 @ 22:06)  Cr: 4.50 (06-03 @ 21:41), 3.01 (06-02 @ 22:44), 5.46 (06-01 @ 22:06)  Glu: 205(06-03 @ 21:41), 110(06-02 @ 22:44), 143(06-01 @ 22:06)    Hgb: 8.6 (06-03 @ 21:41), 9.0 (06-03 @ 14:17), 8.7 (06-02 @ 22:44), 8.4 (06-02 @ 05:27), 7.5 (06-01 @ 22:06), 7.9 (06-01 @ 14:14)  Hct: 28.5 (06-03 @ 21:41), 29.5 (06-03 @ 14:17), 28.0 (06-02 @ 22:44), 27.6 (06-02 @ 05:27), 24.7 (06-01 @ 22:06), 26.2 (06-01 @ 14:14)  WBC: 11.10 (06-03 @ 21:41), 12.92 (06-03 @ 14:17), 14.03 (06-02 @ 22:44), 13.07 (06-02 @ 05:27), 14.03 (06-01 @ 22:06), 16.41 (06-01 @ 14:14)  Plt: 246 (06-03 @ 21:41), 255 (06-03 @ 14:17), 234 (06-02 @ 22:44), 253 (06-02 @ 05:27), 259 (06-01 @ 22:06), 288 (06-01 @ 14:14)    INR:   PTT:     IMAGING:   Recent imaging studies were reviewed.    MEDICATIONS:  acetaminophen     Tablet .. 650 milliGRAM(s) Oral every 6 hours PRN  allopurinol 100 milliGRAM(s) Oral daily  apixaban 5 milliGRAM(s) Oral two times a day  aspirin  chewable 81 milliGRAM(s) Oral daily  atorvastatin 40 milliGRAM(s) Oral at bedtime  chlorhexidine 4% Liquid 1 Application(s) Topical <User Schedule>  erythromycin   Ointment 1 Application(s) Right EYE at bedtime  influenza  Vaccine (HIGH DOSE) 0.7 milliLiter(s) IntraMuscular once  insulin lispro (ADMELOG) corrective regimen sliding scale   SubCutaneous every 6 hours  insulin NPH human recombinant 2 Unit(s) SubCutaneous every 6 hours  lidocaine   4% Patch 1 Patch Transdermal daily  metoprolol tartrate 25 milliGRAM(s) Oral every 6 hours  Nephro-reena 1 Tablet(s) Oral daily  ofloxacin 0.3% Solution 1 Drop(s) Right EYE four times a day  pantoprazole Infusion 8 mG/Hr IV Continuous <Continuous>  povidone iodine 10% Solution 1 Application(s) Topical daily  sucralfate 1 Gram(s) Oral every 12 hours  trifluridine 1% Solution 1 Drop(s) Right EYE every 3 hours    EXAMINATION:  General:  calm  HEENT:  Right conjunctival discharge and congestion   Neuro:  Awake, Agitated, oriented x 0, following commands, Left side AG, right sided weakness UE 3/5, LE 2/5  Cards:  RRR  Respiratory:  no respiratory distress  Adomen:  soft  Extremities:  ischemic changes   Skin:  warm/dry

## 2022-06-04 NOTE — RAPID RESPONSE TEAM SUMMARY - NSSITUATIONBACKGROUNDRRT_GEN_ALL_CORE
66 y/o M--followed by his nephrologist above--patient with a history of CAD with past multiple PCI, with most recent discharge from [facility] in Feb 2022 for non ST elevation MI with LULU of an 85% prox LAD lesion with patient on ASA and now off Plavix per cardiology (only for one month), chronic atrial fibrillation maintained on Pradaxa, essential HTN, type 2 DM previously on oral medications but on insulin, diabetic neuropathy with chronic RIGHT LE venous stasis changes>left, LEFT foot ulcer seen by podiatry, past endarterectomy LEFT CEA in 2014.now found to have R ICA stenosis(>70%) started on triple therapy c/b GIB requiring clipping of bleeding ulcer. Today, RRT was called for       66 y/o M--followed by his nephrologist above--patient with a history of CAD with past multiple PCI, with most recent discharge from [facility] in Feb 2022 for non ST elevation MI with LULU of an 85% prox LAD lesion with patient on ASA and now off Plavix per cardiology (only for one month), chronic atrial fibrillation maintained on Pradaxa, essential HTN, type 2 DM previously on oral medications but on insulin, diabetic neuropathy with chronic RIGHT LE venous stasis changes>left, LEFT foot ulcer seen by podiatry, past endarterectomy LEFT CEA in 2014.now found to have R ICA stenosis(>70%) started on triple therapy c/b GIB requiring clipping of bleeding ulcer. Today, RRT was called for  (+) AMS.

## 2022-06-05 NOTE — PROGRESS NOTE ADULT - SUBJECTIVE AND OBJECTIVE BOX
CARDIOLOGY FOLLOW UP - Dr. Mazariegos  Date of Service: 6/5/22  CC: appears more confused today, events noted    Review of Systems: UTO  Constitutional: No fever, weight loss, or fatigue  Respiratory: No cough, wheezing, or hemoptysis, no shortness of breath  Cardiovascular: No chest pain, palpitations, passing out, dizziness, or leg swelling  Gastrointestinal: No abd or epigastric pain. No nausea, vomiting, or hematemesis; no diarrhea or consiptaiton, no melena or hematochezia  Vascular: No edema     TELEMETRY:    PHYSICAL EXAM:  T(C): 37.2 (06-05-22 @ 07:00), Max: 37.2 (06-04-22 @ 11:00)  HR: 118 (06-05-22 @ 09:00) (78 - 149)  BP: 121/63 (06-05-22 @ 09:00) (101/51 - 143/57)  RR: 23 (06-05-22 @ 09:00) (15 - 27)  SpO2: 98% (06-05-22 @ 09:00) (93% - 100%)  Wt(kg): --  I&O's Summary    04 Jun 2022 07:01  -  05 Jun 2022 07:00  --------------------------------------------------------  IN: 1270 mL / OUT: 0 mL / NET: 1270 mL    05 Jun 2022 07:01  -  05 Jun 2022 09:25  --------------------------------------------------------  IN: 50 mL / OUT: 0 mL / NET: 50 mL        Appearance: Normal	  Cardiovascular: Normal S1 S2,RRR, No JVD, No murmurs  Respiratory: Lungs clear to auscultation	  Gastrointestinal:  Soft, Non-tender, + BS	  Extremities: Normal range of motion, No clubbing, cyanosis or edema  Vascular: Peripheral pulses palpable 2+ bilaterally       Home Medications:  allopurinol 100 mg oral tablet: 1 tab(s) orally once a day (03 Apr 2022 06:34)  aspirin 81 mg oral delayed release tablet: 1 tab(s) orally once a day (03 Apr 2022 06:34)  bumetanide 2 mg oral tablet: 1 tab(s) orally once a day (03 Apr 2022 06:34)  insulin glargine 100 units/mL subcutaneous solution: 25 unit(s) subcutaneous once a day (at bedtime) (03 Apr 2022 06:34)  metOLazone 5 mg oral tablet: 1 tab(s) orally 3 times a week (03 Apr 2022 06:34)  metoprolol succinate 50 mg oral tablet, extended release: 2 tab(s) orally once a day (03 Apr 2022 06:34)  NovoLOG 100 units/mL subcutaneous solution: subcutaneous 4 times a day (before meals and at bedtime) (03 Apr 2022 06:34)  NovoLOG FlexPen 100 units/mL injectable solution: 10 unit(s) subcutaneous 3 times a day (03 Apr 2022 06:34)  oxycodone-acetaminophen 5 mg-325 mg oral tablet: 1 tab(s) orally 2 times a day (03 Apr 2022 06:34)  Pradaxa 150 mg oral capsule: 1 cap(s) orally 2 times a day (03 Apr 2022 06:34)  pregabalin 100 mg oral capsule: 1 cap(s) orally 3 times a day (03 Apr 2022 06:34)        MEDICATIONS  (STANDING):  ALBUTerol    90 MICROgram(s) HFA Inhaler 2 Puff(s) Inhalation every 6 hours  albuterol/ipratropium for Nebulization 3 milliLiter(s) Nebulizer every 6 hours  allopurinol 100 milliGRAM(s) Oral daily  apixaban 5 milliGRAM(s) Oral two times a day  aspirin  chewable 81 milliGRAM(s) Oral daily  atorvastatin 40 milliGRAM(s) Oral at bedtime  chlorhexidine 4% Liquid 1 Application(s) Topical <User Schedule>  diltiazem    Tablet 30 milliGRAM(s) Enteral Tube every 6 hours  erythromycin   Ointment 1 Application(s) Right EYE at bedtime  influenza  Vaccine (HIGH DOSE) 0.7 milliLiter(s) IntraMuscular once  insulin lispro (ADMELOG) corrective regimen sliding scale   SubCutaneous every 6 hours  insulin NPH human recombinant 6 Unit(s) SubCutaneous every 6 hours  lidocaine   4% Patch 1 Patch Transdermal daily  Nephro-reena 1 Tablet(s) Oral daily  ofloxacin 0.3% Solution 1 Drop(s) Right EYE four times a day  pantoprazole  Injectable 40 milliGRAM(s) IV Push every 12 hours  povidone iodine 10% Solution 1 Application(s) Topical daily  sodium chloride 3%  Inhalation 4 milliLiter(s) Inhalation every 12 hours  sucralfate 1 Gram(s) Oral every 12 hours  trifluridine 1% Solution 1 Drop(s) Right EYE every 3 hours        EKG:  RADIOLOGY:  DIAGNOSTIC TESTING:  [ ] Echocardiogram:  [ ] Catherterization:  [ ] Stress Test:  OTHER:     LABS:	 	                          7.7    17.99 )-----------( 276      ( 04 Jun 2022 22:42 )             25.7     06-04    138  |  101  |  31<H>  ----------------------------<  204<H>  4.2   |  25  |  2.92<H>    Ca    8.4      04 Jun 2022 22:42  Phos  1.3     06-04  Mg     1.9     06-04    TPro  6.0  /  Alb  2.8<L>  /  TBili  0.4  /  DBili  x   /  AST  11  /  ALT  <5<L>  /  AlkPhos  113  06-04      PT/INR - ( 04 Jun 2022 22:42 )   PT: 28.7 sec;   INR: 2.47 ratio         PTT - ( 04 Jun 2022 22:42 )  PTT:32.7 sec    CARDIAC MARKERS:

## 2022-06-05 NOTE — PROGRESS NOTE ADULT - ASSESSMENT
Echo 5/13/22:  1. Normal left ventricular internal dimensions and wall  thicknesses.  2. Endocardial visualization enhanced with intravenous  injection of Ultrasonic Enhancing Agent (Lumason). Grossly  borderline normal left ventricular systolic function; no  obvious segmental wall motion abnormality.  3. The right ventricle is not well visualized.  *** Compared with echocardiogram of 2/22/2022, no  significant changes noted.      A/P    68 y/o M with history of CAD, s/p multiple PCI, with most recent 2/22 PCI of LAD in setting of NSTEMI, on ASA only (pradaxa), chronic atrial fibrillation maintained on Pradaxa, essential HTN, type 2 DM previously on oral medications but on insulin, diabetic neuropathy with chronic RIGHT LE venous stasis changes>left, LEFT foot ulcer seen by podiatry, past endarterectomy LEFT CEA in 2014 presenting with 1 week of decreased urine output, cystitis with hematuria     #ANT on CKD, new HD  -oliguric renal failure in setting of UTI/cystitis  -New HD for uremia, renal failure  -s/p L AVG 4/18, s/p permacath placement 4/20  -renal f/ u    #AMS/Lethargy  -ams likely from pain meds, embolic cva  -repeat CT 4/27 unremarkable  -MRI 5/11 revealed small acute infarctions in bilateral posterior centrum semiovale and left corona radiata and left posterior periventricular white matter  -repeat echo with no interval changes  -sp RRT 6/43- possible aspiration event?  -IV abx  -hold feeds  -neuro following       #Acute on chronic HFpEF  -stable  -continue volume removal with HD  -c/w bb  -repeat echo with stable borderline lv fxn    #Chronic AF  -rates mildly elevated  -ccb on hold  -BB adjustment  per NSICU   -c/w eliquis    #HTN  -stable  -c/w meds per NSICU    #CAD, s/p PCI, most recent 2/2022  -c/w ac, asa  -plavix on hold per neurosx   -statin     #carotid stenosis   -previous MRA  noted with Greater than 75% stenosis of the right distal common carotid artery. Approximately 60% stenosis of the proximal left internal carotid artery.  -carotid dopplers 5/11 noted :  There is new occlusion of the left internal carotid artery;  greater than 70% stenosis involving the distal right common carotid artery as was seen previously. Mild to moderate flow-limiting stenosis is seen at the left external   carotid artery.  -CTa head and neck 5/12 noted  -in light of gi bleed on triple therapy, carotid stenting deferred for now due to concern ability to treat post with DAP   -await possible CEA- patient remains stable from cardiac standpoint and can proceed for CEA with acceptable cardiac risk   -biggest concern for CEA would be neuro risk of atiya/post op CVA/IVH in light of overall status, recent cva on imaging, carotid disease burden, unilateral occlusion of ica and likely diffuse cerebrovascular disease   -holding plavix, cont ASA/eliquis for afib  -family to decide on any potential cea    #anemia  -sp egd 6/1 Grade C esophagitis, non bleeding erosive gastropathy, duodenitis, Non obstructing bleeding duodenal ulcer (2nd portion, 10mm in largest diameter) with active bleeding - endoclip x4 placed with control of bleeding, additional 6mm non bleeding clean based duodenal ulcer (2nd portion)  -on ppi gtt, sp PRBC , trend cbc   -GI fu     35 minutes spent on total encounter; more than 50% of the visit was spent counseling and/or coordinating care by the attending physician.

## 2022-06-05 NOTE — PROGRESS NOTE ADULT - SUBJECTIVE AND OBJECTIVE BOX
HPI:  Patient remotely known to me from an admission to [place] in Feb 2017--assigned to me at this point by the ER to admit this 68 y/o M--followed by his nephrologist above--patient with a history of CAD with past multiple PCI, with most recent discharge from [place] in Feb 2022 for non ST elevation MI with LULU of an 85% prox LAD lesion with patient on ASA and now off Plavix per cardiology (only for one month), chronic atrial fibrillation maintained on Pradaxa, essential HTN, type 2 DM previously on oral medications but on insulin, diabetic neuropathy with chronic RIGHT LE venous stasis changes>left, LEFT foot ulcer seen by podiatry, past endarterectomy LEFT CEA in 2014. with patient self referring to the ER following apparently treatment by an urgent care center for an apparent cystitis with hematuria on nitrofurantoin but with patient noting decreased urine output for the past week, and difficulty with B/L coarse tremors for several weeks, with patient having difficulty negotiating his applications on his smart phone (observed by examiner).  Patient with increasing B/L LE oedema and weight gain, with patient with 2 pillow orthopnoea.      OVERNIGHT EVENTS:   No acute events overnight.    VITALS:  T(C): , Max: 36.8 (06-03-22 @ 07:00)  HR:  (81 - 128)  BP: --  ABP:  (136/47 - 174/69)  RR:  (16 - 28)  SpO2:  (88% - 100%)  Wt(kg): --      06-02-22 @ 07:01  -  06-03-22 @ 07:00  --------------------------------------------------------  IN: 780 mL / OUT: 500 mL / NET: 280 mL    06-03-22 @ 07:01  -  06-04-22 @ 06:32  --------------------------------------------------------  IN: 1455 mL / OUT: 0 mL / NET: 1455 mL      LABS:  Na: 135 (06-03 @ 21:41), 137 (06-02 @ 22:44), 137 (06-01 @ 22:06)  K: 3.8 (06-03 @ 21:41), 3.6 (06-02 @ 22:44), 4.8 (06-01 @ 22:06)  Cl: 98 (06-03 @ 21:41), 98 (06-02 @ 22:44), 99 (06-01 @ 22:06)  CO2: 26 (06-03 @ 21:41), 28 (06-02 @ 22:44), 23 (06-01 @ 22:06)  BUN: 25 (06-03 @ 21:41), 15 (06-02 @ 22:44), 11 (06-02 @ 19:34), 44 (06-02 @ 15:13), 40 (06-01 @ 22:06)  Cr: 4.50 (06-03 @ 21:41), 3.01 (06-02 @ 22:44), 5.46 (06-01 @ 22:06)  Glu: 205(06-03 @ 21:41), 110(06-02 @ 22:44), 143(06-01 @ 22:06)    Hgb: 8.6 (06-03 @ 21:41), 9.0 (06-03 @ 14:17), 8.7 (06-02 @ 22:44), 8.4 (06-02 @ 05:27), 7.5 (06-01 @ 22:06), 7.9 (06-01 @ 14:14)  Hct: 28.5 (06-03 @ 21:41), 29.5 (06-03 @ 14:17), 28.0 (06-02 @ 22:44), 27.6 (06-02 @ 05:27), 24.7 (06-01 @ 22:06), 26.2 (06-01 @ 14:14)  WBC: 11.10 (06-03 @ 21:41), 12.92 (06-03 @ 14:17), 14.03 (06-02 @ 22:44), 13.07 (06-02 @ 05:27), 14.03 (06-01 @ 22:06), 16.41 (06-01 @ 14:14)  Plt: 246 (06-03 @ 21:41), 255 (06-03 @ 14:17), 234 (06-02 @ 22:44), 253 (06-02 @ 05:27), 259 (06-01 @ 22:06), 288 (06-01 @ 14:14)    INR:   PTT:     IMAGING:   Recent imaging studies were reviewed.    MEDICATIONS:  acetaminophen     Tablet .. 650 milliGRAM(s) Oral every 6 hours PRN  allopurinol 100 milliGRAM(s) Oral daily  apixaban 5 milliGRAM(s) Oral two times a day  aspirin  chewable 81 milliGRAM(s) Oral daily  atorvastatin 40 milliGRAM(s) Oral at bedtime  chlorhexidine 4% Liquid 1 Application(s) Topical <User Schedule>  erythromycin   Ointment 1 Application(s) Right EYE at bedtime  influenza  Vaccine (HIGH DOSE) 0.7 milliLiter(s) IntraMuscular once  insulin lispro (ADMELOG) corrective regimen sliding scale   SubCutaneous every 6 hours  insulin NPH human recombinant 2 Unit(s) SubCutaneous every 6 hours  lidocaine   4% Patch 1 Patch Transdermal daily  metoprolol tartrate 25 milliGRAM(s) Oral every 6 hours  Nephro-reena 1 Tablet(s) Oral daily  ofloxacin 0.3% Solution 1 Drop(s) Right EYE four times a day  pantoprazole Infusion 8 mG/Hr IV Continuous <Continuous>  povidone iodine 10% Solution 1 Application(s) Topical daily  sucralfate 1 Gram(s) Oral every 12 hours  trifluridine 1% Solution 1 Drop(s) Right EYE every 3 hours    EXAMINATION:  General:  calm  HEENT:  Right conjunctival discharge and congestion   Neuro:  Awake, Agitated, oriented x 0, following commands, Left side AG, right sided weakness UE 3/5, LE 2/5  Cards:  RRR  Respiratory:  no respiratory distress  Adomen:  soft  Extremities:  ischemic changes   Skin:  warm/dry

## 2022-06-05 NOTE — PROGRESS NOTE ADULT - SUBJECTIVE AND OBJECTIVE BOX
SUMMARY:  67 year-old man with strokes in setting of carotid stenosis whose hospital course has been complicated by melena requiring blood transfusions and rapid atrial fibrillation.     24 HOUR EVENTS:  Returned to ICU for melena; status post 1unit PRBC. Continues to have intermittent rapid atrial fibrillation.     VITALS/DATA/ORDERS: [x] Reviewed    EXAMINATION:   Awake, oriented x 2, intermittently follows in arms, left leg 5/5, right side 2/5  Abdomen soft

## 2022-06-05 NOTE — PROGRESS NOTE ADULT - SUBJECTIVE AND OBJECTIVE BOX
[facility] DEPARTMENT OF OPHTHALMOLOGY  ------------------------------------------------------------------------------  Rosalio Maciel MD PGY1  ------------------------------------------------------------------------------    Interval History: No acute events overnight. Patient AOx0, not able to articulate any specific complaints.     MEDICATIONS  (STANDING):  ALBUTerol    90 MICROgram(s) HFA Inhaler 2 Puff(s) Inhalation every 6 hours  albuterol/ipratropium for Nebulization 3 milliLiter(s) Nebulizer every 6 hours  allopurinol 100 milliGRAM(s) Oral daily  apixaban 2.5 milliGRAM(s) Oral every 12 hours  aspirin  chewable 81 milliGRAM(s) Oral daily  atorvastatin 40 milliGRAM(s) Oral at bedtime  chlorhexidine 4% Liquid 1 Application(s) Topical <User Schedule>  diltiazem    Tablet 60 milliGRAM(s) Oral every 6 hours  erythromycin   Ointment 1 Application(s) Right EYE at bedtime  influenza  Vaccine (HIGH DOSE) 0.7 milliLiter(s) IntraMuscular once  insulin lispro (ADMELOG) corrective regimen sliding scale   SubCutaneous every 6 hours  insulin NPH human recombinant 6 Unit(s) SubCutaneous every 6 hours  lidocaine   4% Patch 1 Patch Transdermal daily  Nephro-reena 1 Tablet(s) Oral daily  ofloxacin 0.3% Solution 1 Drop(s) Right EYE four times a day  pantoprazole Infusion 8 mG/Hr (10 mL/Hr) IV Continuous <Continuous>  povidone iodine 10% Solution 1 Application(s) Topical daily  sodium chloride 3%  Inhalation 4 milliLiter(s) Inhalation every 12 hours  sucralfate 1 Gram(s) Oral every 12 hours  trifluridine 1% Solution 1 Drop(s) Right EYE every 3 hours    MEDICATIONS  (PRN):  acetaminophen     Tablet .. 650 milliGRAM(s) Oral every 6 hours PRN Temp greater or equal to 38C (100.4F), Mild Pain (1 - 3)      VITALS: T(C): 37 (06-05-22 @ 15:00)  T(F): 98.6 (06-05-22 @ 15:00), Max: 99 (06-05-22 @ 03:00)  HR: 132 (06-05-22 @ 17:08) (78 - 134)  BP: 138/54 (06-05-22 @ 17:00) (101/51 - 143/57)  RR:  (15 - 29)  SpO2:  (93% - 100%)  Wt(kg): --  General: AAO x 0, AMS    Ophthalmology Exam:  Visual acuity (cc): unable to assess 2/2 AMS  Pupils: PERRL OU, no APD appreciated on exam  Intraocular Pressure:  12, 14  Extraocular movements (EOMs): DEVANG 2/2 MS  Confrontational Visual Field (CVF): DEVANG 2/2 MS  Color Plates: DEVANG 2/2 MS    Pen Light Exam (PLE)  External: no vesicular lesions, no ulcerations, small area of erythema on right frontal forehead, small lateral canthal scab with bleeding on the right eye  Lids/Lashes/Lacrimal Ducts: floppy eyelid, mild erythema, edema of the UL on the right eye; crusting present on lashes, some minor discharge OD. wnl OS.  Sclera/Conjunctiva: trace injection OD. white and quiet OS.  Cornea: Stain 2x2mm supratemporally, much improved from prior exam OD. clear OS.  Anterior Chamber: Deep and formed OU.    Iris: Flat OU.  Lens: trace NS OU

## 2022-06-05 NOTE — CONSULT NOTE ADULT - SUBJECTIVE AND OBJECTIVE BOX
Vascular & Interventional Radiology    HPI: 67y Male with history of CAD with past multiple PCI, with most recent discharge from [place] in Feb 2022 for non ST elevation MI with LULU of an 85% prox LAD lesion with patient on ASA and now off Plavix per cardiology (only for one month), chronic atrial fibrillation maintained on Pradaxa, essential HTN, type 2 DM previously on oral medications but on insulin, diabetic neuropathy with chronic RIGHT LE venous stasis changes>left, LEFT foot ulcer seen by podiatry, past endarterectomy LEFT CEA in 2014. With patient self referring to the ER following apparently treatment by an urgent care center for an apparent cystitis with hematuria on nitrofurantoin but with patient noting decreased urine output for the past week, and difficulty with B/L coarse tremors for several weeks, with patient having difficulty negotiating his applications on his smart phone (observed by examiner).  Patient with increasing B/L LE oedema and weight gain, with patient with 2 pillow orthopnoea. Carotid duplex showed Calcified atherosclerotic disease seen throughout the carotid systems. There is new occlusion of the left internal carotid artery. There is greater than 70% stenosis involving the distal right common carotid artery as was seen previously. Mild to moderate flow-limiting stenosis is seen at the left external carotid artery. Vascular surgery consulted for carotid stenosis. Also w/ GI bleed s/p EGD 6/1/22: Grade C esophagitis, non bleeding erosive gastropathy, duodenitis, Non obstructing bleeding duodenal ulcer (2nd portion, 10mm in largest diameter) with active bleeding - endoclip x4 placed with control of bleeding, additional 6mm non bleeding clean based duodenal ulcer (2nd portion). PPI gtts started post-procedure. s/p additional 1 unit PRBCs post-procedure. H/h continued to downtrend and s/p 1 u pRBC today. INR elevated, on Eliquis and ASA for ICA occlusion. Tachycardic but with AFIB.    Allergies: nitrofurantoin (Nephrotoxicity)    Data:  T(C): 37.2  HR: 110  BP: 136/56  RR: 28  SpO2: 100%    -WBC 17.70 / HgB 7.5 / Hct 24.8 / Plt 268  -Na 138 / Cl 101 / BUN 31 / Glucose 204  -K 4.2 / CO2 25 / Cr 2.92  -ALT <5 / Alk Phos 113 / T.Bili 0.4  -INR2.47    Imaging: reviewed.    Assessment:   67y Male w/ left ICA occlusion on Eliquis and ASA and also UGIB s/p EGD and clipping of duodenal ulcer by GI. IR consulted for downtrending H/H. Melanotic stools but not hematochezia.    Plan:   - Conservative management for now. Transfuse as needed. Trend CBC.  - D/w team switching Eliquis to Hep gtt for now. They will discuss with vascular surgery.  - If patient decompensates, page IR for evaluation [phone].   - Will follow.

## 2022-06-05 NOTE — PROGRESS NOTE ADULT - ASSESSMENT
Assessment and Recommendations:  67y male with a past medical history/ocular history of CAD, ESRD on HD, T2DM, HTN, cardiomyopathy, CVA, Afib, carotid stenosis consulted for right eye pain, found to have epi defect representing dendritic suggestive of HSV  keratitis much improved after trifluridine drops.      1) HSV keratitis, right eye  - patient presented with classic dendritic ulcer pattern suggestive of HSV keratitis, much improved on 6/5 exam  - no vesicular or ulcerative lesions noted around the eye on skin  - no necrosis noted on initial DFE, with red reflex present in all four quadrants of the right eye  - c/w Zirgan (ganciclovir) 0.15% ophthalmic gel 5 times a day or trifluridine 1% drops nine times per day if gel is unavailable   - c/w Ocuflox (ofloxacin) QID  - c/w starting erythromycin ointment qhs right eye  - c/w Valtrex 500mg PO TID  - c/w cool soaks to skin lesions should they develop TID or PRN  - ophthalmology will follow    Patient was seen and discussed with Dr. Higinio Victor PGY-3      Outpatient follow-up: Patient should follow-up with his/her ophthalmologist or with [facility] Department of Ophthalmology within 1 week of after discharge at:    [address]  [place] [address]  [phone]    Rosalio Maciel MD, PGY-1  Also available on [employer] Teams

## 2022-06-05 NOTE — PROGRESS NOTE ADULT - ASSESSMENT
ASSESSMENT/PLAN:    NEURO:  Neuro Checks Q 4hr  ASA 81 mg OD  Eliquis to 5 mg OD  family opt for CEA w/ Dr. RAMOS    PULM:   RA  aspiration precautions     CV:   Afib, CAD  SBP goal 120-180  Atorvastatin 40 mg OD  d/c metoprolol c/d diltiazem 30mg q6   Eliquis 5 mg BID     RENAL:  Fluids: IVL   ESRD on HD - Tues thurs sat     GI:  NGT, tube feeding  Melena  Gastritis, PUD post endoscopic clipping 06/01/2022  Consider angio and embolization in case of rebleeding   protonix BID  Bowel regimen [] colace [x] senna [x] other: miralax    ENDO:   Goal euglycemia (-180)  ISS    HEME/ONC:  VTE prophylaxis: [x] SCDs   Eliquis 5 mg BID, asa    ID:   Monitor for fever   HSV Keratitis -     CODE STATUS:  [x] Full Code [] DNR [] DNI [] Palliative/Comfort Care    DISPOSITION:  [x] ICU [] Stroke Unit [] Floor [] EMU [] RCU [] PCU    [x] Patient is at high risk of neurologic deterioration/death due to: sepsis due to unknown organism, hypotension, cerebral hypoperfusion, acute stroke, altered mental status, respiratory failure requiring intubation     Contact: [phone]

## 2022-06-05 NOTE — PROGRESS NOTE ADULT - ASSESSMENT
67 year old gentleman with PMH of CAD, NSTEMI s/p 3 stents in 2014 (stents to LAD, proximal and distal LCx), ischemic cardiomyopathy, HTN for 10 years, T2DM for 26 years c/b peripheral neuropathy, CVA, TIA, Afib on Pradaxa, chronic RLE wound, L carotid stenosis s/p endarterectomy (2014) just recently admitted  to the [facility] on 2/19/2022 with acute onset chest pain for one day found to have an NSTEMI, CAD, s/p PCI in setting of increased AF rates off bb for 3 days and resolved chest pain, his CKMB peaked and Echo noted with EF 60%,normal LV function, mild TR, mild WV. He was s/p LHC with 85% proximal LAD s/p balloon angioplasty and LULU. He presented to [facility] ED with decreased urine output over the week. Mr. Stevens went to [facility] for hematuria and was started  on Nitrofurantoin. Pt is still taking Nitrofurantoin w/ 5 days left. He came to the ED due to worsening symptoms. Pt reports having a similar incident 4-5 years ago that resolved spontaneously. He reports increased leg edema Dyspnea worse on exertion. his baseline Serum creatinine is in the 2.0 to 2.3 mg/dl range. ANT with serum creatinine of 8.29 with bun 144 and k 5.5      1- ESRD   2- chf chronic   3- hyperphosphatemia /shpt   4- anemia   5- hyperlipidemia   6- HTN /a fib  7- TIA hx   8- hx of carotid stenosis  9- GIB      HD  URR in range at 67%   anemia - required prbc  5/31 hb is lower again prbc as needed keep hb > 8   epogen 80257 Units TIW with HD.    cardizem 60 mg q6

## 2022-06-05 NOTE — PROGRESS NOTE ADULT - SUBJECTIVE AND OBJECTIVE BOX
[facility]   [phone]  RENAL FOLLOW UP NOTE  --------------------------------------------------------------------------------  Chief Complaint:    24 hour events/subjective:    seen earlier.   opens eyes on verbal stimuli does not interact.   however as per RN intermittently interacts at other times     PAST HISTORY  --------------------------------------------------------------------------------  No significant changes to PMH, PSH, FHx, SHx, unless otherwise noted    ALLERGIES & MEDICATIONS  --------------------------------------------------------------------------------  Allergies    nitrofurantoin (Nephrotoxicity)    Intolerances      Standing Inpatient Medications  ALBUTerol    90 MICROgram(s) HFA Inhaler 2 Puff(s) Inhalation every 6 hours  albuterol/ipratropium for Nebulization 3 milliLiter(s) Nebulizer every 6 hours  allopurinol 100 milliGRAM(s) Oral daily  apixaban 2.5 milliGRAM(s) Oral every 12 hours  aspirin  chewable 81 milliGRAM(s) Oral daily  atorvastatin 40 milliGRAM(s) Oral at bedtime  chlorhexidine 4% Liquid 1 Application(s) Topical <User Schedule>  diltiazem    Tablet 60 milliGRAM(s) Oral every 6 hours  diltiazem Infusion 5 mG/Hr IV Continuous <Continuous>  erythromycin   Ointment 1 Application(s) Right EYE at bedtime  influenza  Vaccine (HIGH DOSE) 0.7 milliLiter(s) IntraMuscular once  insulin lispro (ADMELOG) corrective regimen sliding scale   SubCutaneous every 6 hours  insulin NPH human recombinant 6 Unit(s) SubCutaneous every 6 hours  lidocaine   4% Patch 1 Patch Transdermal daily  Nephro-reena 1 Tablet(s) Oral daily  ofloxacin 0.3% Solution 1 Drop(s) Right EYE four times a day  pantoprazole Infusion 8 mG/Hr IV Continuous <Continuous>  povidone iodine 10% Solution 1 Application(s) Topical daily  sodium chloride 3%  Inhalation 4 milliLiter(s) Inhalation every 12 hours  sucralfate 1 Gram(s) Oral every 12 hours  trifluridine 1% Solution 1 Drop(s) Right EYE every 3 hours    PRN Inpatient Medications  acetaminophen     Tablet .. 650 milliGRAM(s) Oral every 6 hours PRN      REVIEW OF SYSTEMS  --------------------------------------------------------------------------------      VITALS/PHYSICAL EXAM  --------------------------------------------------------------------------------  T(C): 37 (06-05-22 @ 15:00), Max: 37.2 (06-05-22 @ 03:00)  HR: 150 (06-05-22 @ 18:00) (78 - 150)  BP: 101/65 (06-05-22 @ 18:00) (101/51 - 143/57)  RR: 30 (06-05-22 @ 18:00) (15 - 30)  SpO2: 95% (06-05-22 @ 18:00) (93% - 100%)  Wt(kg): --        06-04-22 @ 07:01  -  06-05-22 @ 07:00  --------------------------------------------------------  IN: 1270 mL / OUT: 0 mL / NET: 1270 mL    06-05-22 @ 07:01  -  06-05-22 @ 19:01  --------------------------------------------------------  IN: 530 mL / OUT: 0 mL / NET: 530 mL      Physical Exam:  	               Gen: lethargic  	Pulm: Decreased breath sounds b/l bases.  	CV: No JVD. IRR,  	Abd: +BS, soft, nontender, softly distended, obese, +NGT               Extremity no edema              Extremity LE: + hyperpigmented bilateral LE with no edema              Vascular: R IJ HD catheter, L arm AVF       LABS/STUDIES  --------------------------------------------------------------------------------              7.0    18.65 >-----------<  275      [06-05-22 @ 17:46]              22.3     138  |  101  |  31  ----------------------------<  204      [06-04-22 @ 22:42]  4.2   |  25  |  2.92        Ca     8.4     [06-04-22 @ 22:42]      Mg     1.9     [06-04-22 @ 22:42]      Phos  1.3     [06-04-22 @ 22:42]    TPro  6.0  /  Alb  2.8  /  TBili  0.4  /  DBili  x   /  AST  11  /  ALT  <5  /  AlkPhos  113  [06-04-22 @ 22:42]    PT/INR: PT 28.7 , INR 2.47       [06-04-22 @ 22:42]  PTT: 32.7       [06-04-22 @ 22:42]    CK 28      [06-04-22 @ 22:42]    Creatinine Trend:  SCr 2.92 [06-04 @ 22:42]  SCr 4.50 [06-03 @ 21:41]  SCr 3.01 [06-02 @ 22:44]  SCr 5.46 [06-01 @ 22:06]  SCr 3.41 [05-31 @ 22:52]                  Iron 21, TIBC 245, %sat 9      [05-17-22 @ 05:54]  Ferritin 120      [05-17-22 @ 05:54]  PTH -- (Ca 8.0)      [06-04-22 @ 13:30]   66  PTH -- (Ca 8.1)      [06-02-22 @ 15:14]   75  PTH -- (Ca 8.1)      [05-28-22 @ 13:06]   96  PTH -- (Ca 8.5)      [05-24-22 @ 14:22]   84  PTH -- (Ca 8.3)      [04-15-22 @ 12:18]   150  PTH -- (Ca --)      [04-03-22 @ 23:06]   97  HbA1c 11.7      [11-07-19 @ 09:14]  TSH 3.08      [06-04-22 @ 22:54]

## 2022-06-05 NOTE — PROGRESS NOTE ADULT - NS MD NEURO CONDITIONS_ENCEPHAL
Metabolic
Metabolic/Neurological
Neurological
Neurological
Metabolic
Neurological
Metabolic/Neurological
Neurological

## 2022-06-05 NOTE — PROGRESS NOTE ADULT - ASSESSMENT
ASSESSMENT/PLAN: Rapid atrial fibrillation, GI Bleed, carotid stenosis status post watershed infarcts    - -180mmHg for cerebral perfusion given carotid stenosis  - Afib: switch to diltiazem gtt for better control  - Hb>7, transfuse 2units PRBC  - CEA plan with vascular surgery when stable  - HSV keratitis: valcyclovir  - ESRD on HD    At risk of death/neuro decline due to: afib with RVR and acute blood loss anemia     ASSESSMENT/PLAN: Rapid atrial fibrillation, GI Bleed, carotid stenosis status post watershed infarcts    - -180mmHg for cerebral perfusion given carotid stenosis  - Afib: switch to diltiazem gtt for better control  - Hb>7, transfuse 2units PRBC; GI and IR following but did not offer any interventions as holding BP  - Continue PPI gtt  - CEA plan with vascular surgery when stable  - HSV keratitis: valacyclovir  - ESRD on HD  - Increase NPH for improved glycemic control    At risk of death/neuro decline due to: afib with RVR and acute blood loss anemia     ASSESSMENT/PLAN: Rapid atrial fibrillation, GI Bleed, carotid stenosis status post watershed infarcts    - -180mmHg for cerebral perfusion given carotid stenosis  - Afib: switch to diltiazem gtt for better control  - Hb>7, transfuse 2units PRBC; GI and IR following but did not offer any interventions as holding BP  - Continue PPI gtt  - CEA plan with vascular surgery when stable  - HSV keratitis: valacyclovir  - ESRD on HD  - Increase NPH for improved glycemic control    At risk of death/neuro decline due to: afib with RVR and acute blood loss anemia

## 2022-06-05 NOTE — PROGRESS NOTE ADULT - ATTENDING COMMENTS
return to ICU for rapid a.fib.  another unit PRBCs transfused.  still with melanotic stool.  IR consult for possible intervention, increase CCB for rate control, transfuse as needed.

## 2022-06-06 NOTE — PROGRESS NOTE ADULT - SUBJECTIVE AND OBJECTIVE BOX
Patient is a 67y old  Male who presents with a chief complaint of Decreased urine output for the past week, leg oedema (06 Jun 2022 19:04)      Vascular Surgery Attending Progress Note    Interval HPI: pt in IR for embolization of gi bleeding     Medications:  acetaminophen     Tablet .. 650 milliGRAM(s) Oral every 6 hours PRN  ALBUTerol    90 MICROgram(s) HFA Inhaler 2 Puff(s) Inhalation every 6 hours  albuterol/ipratropium for Nebulization 3 milliLiter(s) Nebulizer every 6 hours  allopurinol 100 milliGRAM(s) Oral daily  apixaban 2.5 milliGRAM(s) Oral every 12 hours  aspirin  chewable 81 milliGRAM(s) Oral daily  atorvastatin 40 milliGRAM(s) Oral at bedtime  diltiazem    Tablet 90 milliGRAM(s) Oral every 8 hours  diltiazem Infusion 5 mG/Hr IV Continuous <Continuous>  erythromycin   Ointment 1 Application(s) Right EYE at bedtime  influenza  Vaccine (HIGH DOSE) 0.7 milliLiter(s) IntraMuscular once  insulin lispro (ADMELOG) corrective regimen sliding scale   SubCutaneous every 6 hours  insulin NPH human recombinant 8 Unit(s) SubCutaneous every 6 hours  lidocaine   4% Patch 1 Patch Transdermal daily  Nephro-reena 1 Tablet(s) Oral daily  ofloxacin 0.3% Solution 1 Drop(s) Right EYE four times a day  pantoprazole Infusion 8 mG/Hr IV Continuous <Continuous>  povidone iodine 10% Solution 1 Application(s) Topical daily  sodium chloride 3%  Inhalation 4 milliLiter(s) Inhalation every 12 hours  sucralfate 1 Gram(s) Oral every 12 hours  trifluridine 1% Solution 1 Drop(s) Right EYE every 3 hours      Vital Signs Last 24 Hrs  T(C): 36.8 (06 Jun 2022 17:25), Max: 37.7 (05 Jun 2022 20:30)  T(F): 98.2 (06 Jun 2022 15:00), Max: 99.9 (05 Jun 2022 20:30)  HR: 102 (06 Jun 2022 17:25) (88 - 130)  BP: 135/57 (06 Jun 2022 17:25) (121/55 - 154/57)  BP(mean): 78 (06 Jun 2022 17:25) (69 - 87)  RR: 23 (06 Jun 2022 17:25) (20 - 33)  SpO2: 95% (06 Jun 2022 17:25) (92% - 100%)  I&O's Summary    05 Jun 2022 07:01  -  06 Jun 2022 07:00  --------------------------------------------------------  IN: 2338.3 mL / OUT: 0 mL / NET: 2338.3 mL    06 Jun 2022 07:01  -  06 Jun 2022 19:50  --------------------------------------------------------  IN: 135 mL / OUT: 0 mL / NET: 135 mL        Physical Exam:  Abd soft nt nd  Vascular:  stable     LABS:                        6.8    19.23 )-----------( 273      ( 06 Jun 2022 15:04 )             21.6     06-06    137  |  100  |  67<H>  ----------------------------<  242<H>  4.3   |  24  |  4.57<H>    Ca    8.1<L>      06 Jun 2022 01:28  Phos  1.7     06-06  Mg     1.8     06-06    TPro  6.0  /  Alb  2.8<L>  /  TBili  0.4  /  DBili  x   /  AST  11  /  ALT  <5<L>  /  AlkPhos  113  06-04    PT/INR - ( 06 Jun 2022 07:21 )   PT: 21.7 sec;   INR: 1.86 ratio         PTT - ( 06 Jun 2022 07:21 )  PTT:31.7 sec    UMAIR MCGRAW MD  [id number] Cell [phone]

## 2022-06-06 NOTE — PROGRESS NOTE ADULT - ASSESSMENT
67M w/ prolonged hospital course on medicine service iso renal failure req dialysis c/b b/l hypoperfusions strokes from R ICA stenosis 70-80% and L ICA occlusion (prior CEA) planned for BOOKER stent but course c/b acute uremia , GIB (melena) , and additional hypoperfusion episode and xfer to nscu.    episodes of melena 5/30-5/31  off PLavix since 5/31, IV PPI BID started 5/31; remains on ASA and Apixaban (AF) in setting of new strokes and L ICA occlusion    #acute GI blood loss anemia 2/2 bleeding Duodenal ulcer, erosive gastritis (non bleeding), duodenitis and additional non bleeding cratered DU  s/p EGD 6/1/22: Grade C esophagitis, non bleeding erosive gastropathy, duodenitis, Non obstructing bleeding duodenal ulcer (2nd portion, 10mm in largest diameter) with active bleeding - endoclip x4 placed with control of bleeding, additional 6mm non bleeding clean based duodenal ulcer (2nd portion)  High risk for recurrent GI bleeding on triple therapy (DAPT + AC)  re-bleeding 6/5/22 with suboptimal Hgb response to transfusions - IR consulted    -IR input appreciated; plan for IR angio/embo today  -low dose AC per primary team (requires ongoing AC 2/2 L ICA stenosis with AF and hypoperfusionvCVAs  -transfuse PRN for goal Hgb >=8  -continue PPI gtts  -continue carafate (grade C esophagitis)    Discussed with NSCU team and attending    Nicolas Coleman PA-C    [facility]  [phone]  After hours and weekend coverage [phone]

## 2022-06-06 NOTE — PROGRESS NOTE ADULT - ASSESSMENT
Echo 5/13/22:  1. Normal left ventricular internal dimensions and wall  thicknesses.  2. Endocardial visualization enhanced with intravenous  injection of Ultrasonic Enhancing Agent (Lumason). Grossly  borderline normal left ventricular systolic function; no  obvious segmental wall motion abnormality.  3. The right ventricle is not well visualized.  *** Compared with echocardiogram of 2/22/2022, no  significant changes noted.      A/P    66 y/o M with history of CAD, s/p multiple PCI, with most recent 2/22 PCI of LAD in setting of NSTEMI, on ASA only (pradaxa), chronic atrial fibrillation maintained on Pradaxa, essential HTN, type 2 DM previously on oral medications but on insulin, diabetic neuropathy with chronic RIGHT LE venous stasis changes>left, LEFT foot ulcer seen by podiatry, past endarterectomy LEFT CEA in 2014 presenting with 1 week of decreased urine output, cystitis with hematuria     #ANT on CKD, new HD  -oliguric renal failure in setting of UTI/cystitis  -New HD for uremia, renal failure  -s/p L AVG 4/18, s/p permacath placement 4/20  -renal f/ u    #AMS/Lethargy  -ams likely from pain meds, embolic cva  -repeat CT 4/27 unremarkable  -MRI 5/11 revealed small acute infarctions in bilateral posterior centrum semiovale and left corona radiata and left posterior periventricular white matter  -repeat echo with no interval changes  -sp RRT 6/43- possible aspiration event?  -IV abx  -hold feeds  -neuro following       #Acute on chronic HFpEF  -stable  -continue volume removal with HD  -c/w bb  -repeat echo with stable borderline lv fxn    #Chronic AF  -rates mildly elevated  -ccb on hold  -BB adjustment  per NSICU   -c/w eliquis    #HTN  -stable  -c/w meds per NSICU    #CAD, s/p PCI, most recent 2/2022  -c/w ac, asa  -plavix on hold per neurosx   -statin     #carotid stenosis   -previous MRA  noted with Greater than 75% stenosis of the right distal common carotid artery. Approximately 60% stenosis of the proximal left internal carotid artery.  -carotid dopplers 5/11 noted :  There is new occlusion of the left internal carotid artery;  greater than 70% stenosis involving the distal right common carotid artery as was seen previously. Mild to moderate flow-limiting stenosis is seen at the left external   carotid artery.  -CTa head and neck 5/12 noted  -in light of gi bleed on triple therapy, carotid stenting deferred for now due to concern ability to treat post with DAP   -await possible CEA- patient remains stable from cardiac standpoint and can proceed for CEA with acceptable cardiac risk   -biggest concern for CEA would be neuro risk of atiya/post op CVA/IVH in light of overall status, recent cva on imaging, carotid disease burden, unilateral occlusion of ica and likely diffuse cerebrovascular disease   -holding plavix, cont ASA/eliquis for afib  -family to decide on any potential cea    #anemia  -sp egd 6/1 Grade C esophagitis, non bleeding erosive gastropathy, duodenitis, Non obstructing bleeding duodenal ulcer (2nd portion, 10mm in largest diameter) with active bleeding - endoclip x4 placed with control of bleeding, additional 6mm non bleeding clean based duodenal ulcer (2nd portion)  -on ppi gtt, sp PRBC , trend cbc   -GI fu   -continues to actively bleed  -awaiting possible IR embolization    35 minutes spent on total encounter; more than 50% of the visit was spent counseling and/or coordinating care by the attending physician.

## 2022-06-06 NOTE — PROGRESS NOTE ADULT - ASSESSMENT
67M PMH of CAD, NSTEMI s/p 3 stents in 2014 (stents to LAD, proximal and distal LCx), ischemic cardiomyopathy, HTN for 10 years, T2DM for 26 years c/b peripheral neuropathy, CVA, TIA, Afib on Pradaxa, chronic RLE wound, L carotid stenosis s/p endarterectomy (2014) just recently admitted  to the [facility] on 2/19/2022 with acute onset chest pain for one day found to have an NSTEMI, CAD, s/p PCI in setting of increased AF rates off bb for 3 days and resolved chest pain, his CKMB peaked and Echo noted with EF 60%,normal LV function, mild TR, mild VT. He was s/p LHC with 85% proximal LAD s/p balloon angioplasty and LULU. He presented to [facility] ED with decreased urine output over the week. Carotid duplex showed Calcified atherosclerotic disease seen throughout the carotid systems. There is new occlusion of the left internal carotid artery. There is greater than 70% stenosis involving the distal right common carotid artery as was seen previously. Mild to moderate flow-limiting stenosis is seen at the left external carotid artery. Vascular surgery consulted for carotid stenosis. Pt currently denies vision changes, dizziness, lightheadedness or any other symptoms at this point in time.     PLAN:  pt w new gi bleeding  in IR for embolization   fam consents and accepts risks and benefits of rt cea  given new gi bleeding event will postpone cea and re eval  will follow

## 2022-06-06 NOTE — PROGRESS NOTE ADULT - ASSESSMENT
ASSESSMENT/PLAN: Rapid atrial fibrillation, GI Bleed, carotid stenosis status post watershed infarcts  - Sedation while paralyzed; re-examine when paralytic worn off  - At risk of embolic stroke from carotid plaque; on anticoagulation - would switch to heparin gtt; low threshold to discontinue if drop in H/H or hemodynamic instability  - Wean to extubate  - -180mmHg for cerebral perfusion given carotid stenosis; no on pressor  - Afib: diltiazem  - Hb>7  - Continue PPI gtt  - CEA plan with vascular surgery when stable  - HSV keratitis: valacyclovir  - ESRD on HD  - NPH for glycemic control    At risk of death/neuro decline due to: afib with RVR and acute blood loss anemia

## 2022-06-06 NOTE — PROGRESS NOTE ADULT - ASSESSMENT
Impression:  This is a 67 year old gentleman pmhx CAD s/p MI/PCI/CABG, hx TIA, afib on rivaroxaban, HTN, DM2, PVD, gout, L CEA 2014, and CKD who presented to [facility] 4/11/22 with ANT, tremors, confusion and lethargy.  CT head no acute changes.  S/p initiation of hemodialysis.  Mental status had improved dramatically.  Pt denies any headaches, no slurred speech, no hemiparesis.  He has chronic gout with bilateral finger swelling, pain.  He also has chronic neuropathy.  Both legs are weak.      Persistent encephalopathy prompted re-consultation on 5/10/22.  MRI brain was ordered and revealed small acute infarctions in bilateral posterior centrum semiovale and left corona radiata and left posterior periventricular white matter.  Carotid ultrasound was performed showing new occlusion of the left internal carotid artery, along with greater than 70% stenosis involving the distal right common carotid artery, and mild to moderate flow-limiting stenosis is seen at the left external carotid artery.  Vascular surgery has been consulted and is recommending CTA.  He continues to be encephalopathic and lethargic.      5/30 stroke code activated for altered mental status.   MRI brain shows new small, scattered infarcts, more in left hemisphere.  Unclear if cause of encephalopathy   s/p DU clipping, needed transfusion on 6/1 cammie  v    Diagnosis and Recommendations:  1.  Multifactorial delirium - has been waxing and waning through his hospital course, for this had   video EEG  was negative  he has had another decline in his level of mentation, transfered back to nscu for this  had transfusion again on 6/5 for potential coiling of bleeding source by interventional radiology    2. Complete left ICA occlusion and 70% distal CCA stenosis. Has collateral flow via acomm and pcomm but should be re-evaluated for either CEA or carotid stenting. CEA may be ideal considering issues with medication compliance and potential difficulty adhering to DAPT regimen but defer to proceduralists regarding this. On 5/25, Dr David discussed extensively with son at bedside and daughter via FaceTime: all options here have risk associated with them.  Opening the artery may or may not help.  However, it will reduce long-term stroke risk.  It might help with his mental status by helping brain perfusion, though this cannot be guaranteed.  There is risk associated with procedure.           - MRA was suboptimal not visualizing the neck but MRA head showed left KESHAWN coming from ACOM and left PCA feeding left MCA.  ICA showed no flow.    - in setting of atrial fibrillation infarcts could potentially also be cardioembolic, continue anticoagulation as per cardiology  - s/p carotid doppler, suboptimal CTA/MRA.  However, it is apparent that there is a proximal left ICA occlusion, which would not be a candidate for intervention.  Intervention for right CCA/ICA stenosis would be high risk but it may indeed by a symptomatic stenosis.   - ECHO in February 2022 did not reveal severe cardiomyopathy, vegetation, or LV thrombus.    - HgA1c of 11 also driving stroke risk.  Goal is < 7  - continue high-intensity statin therapy    The issue is the high grade right stenosis and high risk of recurrent stroke, appreciate notes from neurointervention on risks of cea vs stent, vasc note appreciated     pt is critical and unstable, worsening mental status, requiring procedures for GIB, spent > 50 minutes  reviewed in detail with RN  poor prognosis

## 2022-06-06 NOTE — PROGRESS NOTE ADULT - PROBLEM SELECTOR PLAN 1
I Edward Pinto MD have personally  seen and examined the patient today and have noted the findings and formulated the plan of care.  I Edward Pinto MD have personally seen and examined the patient at bedside today at 6pm

## 2022-06-06 NOTE — PRE-ANESTHESIA EVALUATION ADULT - NSANTHAPLANRD_GEN_ALL_CORE
Health Maintenance Due   Topic Date Due    COVID-19 Vaccine (4 - Booster for Pfizer series) 04/14/2022     Updates were requested from care everywhere.  Chart was reviewed for overdue Proactive [facility] Encounters ([facility]) topics (CRS, Breast Cancer Screening, Eye exam)  Health Maintenance has been updated.  LINKS immunization registry triggered.  Immunizations were reconciled.      
monitored anesthesia care (MAC)
general
general

## 2022-06-06 NOTE — PROGRESS NOTE ADULT - SUBJECTIVE AND OBJECTIVE BOX
Follow-up Pulm Progress Note    pt lethargic  Sats 97% RA  blood tinged sputum suctioned nasally - likely from trauma     Medications:  MEDICATIONS  (STANDING):  ALBUTerol    90 MICROgram(s) HFA Inhaler 2 Puff(s) Inhalation every 6 hours  albuterol/ipratropium for Nebulization 3 milliLiter(s) Nebulizer every 6 hours  allopurinol 100 milliGRAM(s) Oral daily  apixaban 2.5 milliGRAM(s) Oral every 12 hours  aspirin  chewable 81 milliGRAM(s) Oral daily  atorvastatin 40 milliGRAM(s) Oral at bedtime  diltiazem    Tablet 90 milliGRAM(s) Oral every 8 hours  diltiazem Infusion 5 mG/Hr (5 mL/Hr) IV Continuous <Continuous>  erythromycin   Ointment 1 Application(s) Right EYE at bedtime  influenza  Vaccine (HIGH DOSE) 0.7 milliLiter(s) IntraMuscular once  insulin lispro (ADMELOG) corrective regimen sliding scale   SubCutaneous every 6 hours  insulin NPH human recombinant 8 Unit(s) SubCutaneous every 6 hours  lidocaine   4% Patch 1 Patch Transdermal daily  Nephro-reena 1 Tablet(s) Oral daily  ofloxacin 0.3% Solution 1 Drop(s) Right EYE four times a day  pantoprazole Infusion 8 mG/Hr (10 mL/Hr) IV Continuous <Continuous>  povidone iodine 10% Solution 1 Application(s) Topical daily  sodium chloride 3%  Inhalation 4 milliLiter(s) Inhalation every 12 hours  sucralfate 1 Gram(s) Oral every 12 hours  trifluridine 1% Solution 1 Drop(s) Right EYE every 3 hours    MEDICATIONS  (PRN):  acetaminophen     Tablet .. 650 milliGRAM(s) Oral every 6 hours PRN Temp greater or equal to 38C (100.4F), Mild Pain (1 - 3)          Vital Signs Last 24 Hrs  T(C): 37 (06 Jun 2022 11:00), Max: 37.7 (05 Jun 2022 20:30)  T(F): 98.6 (06 Jun 2022 11:00), Max: 99.9 (05 Jun 2022 20:30)  HR: 97 (06 Jun 2022 14:00) (88 - 150)  BP: 136/58 (06 Jun 2022 14:00) (101/65 - 154/57)  BP(mean): 81 (06 Jun 2022 14:00) (69 - 87)  RR: 24 (06 Jun 2022 14:00) (17 - 33)  SpO2: 96% (06 Jun 2022 14:00) (92% - 100%)    ABG - ( 04 Jun 2022 22:41 )  pH, Arterial: 7.46  pH, Blood: x     /  pCO2: 38    /  pO2: 206   / HCO3: 27    / Base Excess: 3.0   /  SaO2: 99.8                  06-05 @ 07:01  -  06-06 @ 07:00  --------------------------------------------------------  IN: 2338.3 mL / OUT: 0 mL / NET: 2338.3 mL          LABS:                        6.8    19.23 )-----------( 273      ( 06 Jun 2022 15:04 )             21.6     06-06    137  |  100  |  67<H>  ----------------------------<  242<H>  4.3   |  24  |  4.57<H>    Ca    8.1<L>      06 Jun 2022 01:28  Phos  1.7     06-06  Mg     1.8     06-06    TPro  6.0  /  Alb  2.8<L>  /  TBili  0.4  /  DBili  x   /  AST  11  /  ALT  <5<L>  /  AlkPhos  113  06-04      CARDIAC MARKERS ( 04 Jun 2022 22:42 )  x     / x     / 28 U/L / x     / 2.1 ng/mL      CAPILLARY BLOOD GLUCOSE      POCT Blood Glucose.: 217 mg/dL (06 Jun 2022 12:53)    PT/INR - ( 06 Jun 2022 07:21 )   PT: 21.7 sec;   INR: 1.86 ratio         PTT - ( 06 Jun 2022 07:21 )  PTT:31.7 sec    Procalcitonin, Serum: 0.78 ng/mL (06-04-22 @ 22:42)                  CULTURES:     Culture - Blood (collected 06-04-22 @ 22:49)  Source: .Blood Blood-Peripheral  Preliminary Report (06-06-22 @ 06:01):    No growth to date.    Culture - Blood (collected 05-31-22 @ 02:33)  Source: .Blood Blood  Final Report (06-05-22 @ 09:00):    No Growth Final    Culture - Blood (collected 05-31-22 @ 02:33)  Source: .Blood Blood  Final Report (06-05-22 @ 09:00):    No Growth Final          Physical Examination:  PULM: decreased BS   CVS: S1, S2 heard    RADIOLOGY REVIEWED  CXR: L effusion/atelectasis

## 2022-06-06 NOTE — PROGRESS NOTE ADULT - ASSESSMENT
66 y/o M with PMH of pleural effusion (2019 s/p R thoracentesis, then R CT placement with course c/b R hydroPTX - tx to [facility] for possible VATs decortication which was deferred), CAD, NSTEMI s/p stents (2014 stents to LAD, proximal and distal LCx, and additional LULU to proximal LAD 2/2022), ischemic cardiomyopathy, HTN, T2DM c/b peripheral neuropathy, CVA, TIA, Afib, chronic RLE wound, L carotid stenosis s/p endarterectomy (2014). Presented to ED with hematuria, B/L LE edema, weight gain, and orthopnea - found to be in volume overload, renal failure. Course c/b worsening mental status, GIB.  no

## 2022-06-06 NOTE — PROCEDURE NOTE - PROCEDURE FINDINGS AND DETAILS
celiac artery angiography performed with no active extravasation.  empiric coil embolization of the GDA was performed.   SMA angiography performed, with no active extravasation.
Conversion of temporary to 14 [place] 23 cm tunneled HD catheter using US and fluoro guidance. Tip in SVC.  May use immediately.  Full report to follow.

## 2022-06-06 NOTE — PRE-OP CHECKLIST - NOTHING BY MOUTH SINCE
02-Jun-2022 00:00
31-May-2022 00:00
06-Jun-2022 06:30
07-Apr-2022 22:00
01-Jun-2022 00:00
20-Apr-2022 00:00

## 2022-06-06 NOTE — PROGRESS NOTE ADULT - SUBJECTIVE AND OBJECTIVE BOX
INTERVAL HPI/OVERNIGHT EVENTS:  prior events noted  episodes of melenic stools yesterday  s/p 3units PRBCs/24 hours without significant improvement in h/h  planned IR angio/embo today  NGT fees on hold  requires ongoing AC    MEDICATIONS  (STANDING):  ALBUTerol    90 MICROgram(s) HFA Inhaler 2 Puff(s) Inhalation every 6 hours  albuterol/ipratropium for Nebulization 3 milliLiter(s) Nebulizer every 6 hours  allopurinol 100 milliGRAM(s) Oral daily  apixaban 2.5 milliGRAM(s) Oral every 12 hours  aspirin  chewable 81 milliGRAM(s) Oral daily  atorvastatin 40 milliGRAM(s) Oral at bedtime  chlorhexidine 4% Liquid 1 Application(s) Topical <User Schedule>  diltiazem Infusion 5 mG/Hr (5 mL/Hr) IV Continuous <Continuous>  erythromycin   Ointment 1 Application(s) Right EYE at bedtime  influenza  Vaccine (HIGH DOSE) 0.7 milliLiter(s) IntraMuscular once  insulin lispro (ADMELOG) corrective regimen sliding scale   SubCutaneous every 6 hours  insulin NPH human recombinant 12 Unit(s) SubCutaneous every 6 hours  lidocaine   4% Patch 1 Patch Transdermal daily  Nephro-reena 1 Tablet(s) Oral daily  ofloxacin 0.3% Solution 1 Drop(s) Right EYE four times a day  pantoprazole Infusion 8 mG/Hr (10 mL/Hr) IV Continuous <Continuous>  povidone iodine 10% Solution 1 Application(s) Topical daily  sodium chloride 3%  Inhalation 4 milliLiter(s) Inhalation every 12 hours  sucralfate 1 Gram(s) Oral every 12 hours  trifluridine 1% Solution 1 Drop(s) Right EYE every 3 hours    MEDICATIONS  (PRN):  acetaminophen     Tablet .. 650 milliGRAM(s) Oral every 6 hours PRN Temp greater or equal to 38C (100.4F), Mild Pain (1 - 3)      Allergies  nitrofurantoin (Nephrotoxicity)      Review of Systems:  unable to obtain    Vital Signs Last 24 Hrs  T(C): 36.9 (06 Jun 2022 07:00), Max: 37.7 (05 Jun 2022 20:30)  T(F): 98.4 (06 Jun 2022 07:00), Max: 99.9 (05 Jun 2022 20:30)  HR: 101 (06 Jun 2022 09:00) (88 - 150)  BP: 154/57 (06 Jun 2022 09:00) (101/65 - 154/57)  BP(mean): 80 (06 Jun 2022 09:00) (69 - 93)  RR: 27 (06 Jun 2022 09:00) (17 - 33)  SpO2: 92% (06 Jun 2022 09:00) (92% - 100%)    PHYSICAL EXAM:  Constitutional: NAD resting comfortably +NGT (kaofeed)  HEENT: PERRLA, EOMI  Neck:  No JVD  Respiratory: CTAB/L  Cardiovascular: S1 and S2, tachy  Gastrointestinal: BS+, soft, NT/ND, no scars  Extremities: No peripheral edema +chronic stasis changes b/l  +wrist restraints  Neurological: resting, arousable moves all extremities. +Rt facial droop  Skin: No rashes +chronic stasis changes b/l LE      LABS:                        7.5    17.74 )-----------( 275      ( 06 Jun 2022 07:21 )             23.9   Hemoglobin: 7.6 g/dL (06.06.22 @ 01:28)   Hemoglobin: 7.0 g/dL (06.05.22 @ 17:46)   Hemoglobin: 7.5 g/dL (06.05.22 @ 11:33)   Hemoglobin: 7.7 g/dL (06.04.22 @ 22:42)   Hemoglobin: 7.8 g/dL (06.04.22 @ 13:29)   Hemoglobin: 8.4 g/dL (06.04.22 @ 06:07)   06-06    137  |  100  |  67<H>  ----------------------------<  242<H>  4.3   |  24  |  4.57<H>    Ca    8.1<L>      06 Jun 2022 01:28  Phos  1.7     06-06  Mg     1.8     06-06    TPro  6.0  /  Alb  2.8<L>  /  TBili  0.4  /  DBili  x   /  AST  11  /  ALT  <5<L>  /  AlkPhos  113  06-04    PT/INR - ( 06 Jun 2022 07:21 )   PT: 21.7 sec;   INR: 1.86 ratio    PTT - ( 06 Jun 2022 07:21 )  PTT:31.7 sec    Ammonia, Serum: 16 umol/L (06.04.22 @ 22:56)     RADIOLOGY & ADDITIONAL TESTS:

## 2022-06-06 NOTE — CHART NOTE - NSCHARTNOTEFT_GEN_A_CORE
Nutrition Follow Up Note  Patient seen for: Nutrition Follow Up    Chart reviewed, events noted. Pt is a 66 yo M with PMH: CAD with multiple PCI. Most recently discharged from [facility] in 2022 for non ST elevation MI with LULU of an 85% prox LAD lesion; HTN, T2DM, diabetic neuropathy with chronic R LE venous stasis changes > L, L foot ulcer, past endarterectomy L CEA in . Now with prolonged hospital course in setting of renal failure requiring dialysis c/b b/l hypoperfusion strokes from R ICA stenosis 70-80% and L ICA occlusion (prior CEA) planned for R ICA stent but course c/b acute uremia, GIB, and additional hypoperfusion episode. S/P RRT  for unresponsiveness. Transferred to [facility]. S/P EGD . Noted with Grade C esophagitis, non bleeding erosive gastropathy, duodenitis, Non obstructing bleeding duodenal ulcer with active bleeding. Received endoclip x 4. Hospital course c/b downtrending H/H, persistently bleeding. Plan for catheter angio/embo with IR. Continues on HD in setting of ESRD as per discretion of Nephrology.     Source: [] Patient       [x] EMR        [x] RN        [] Family at bedside       [x] Other: interdisciplinary medical team    -If unable to interview patient:   [x] Disoriented/confused/inappropriate to interview    Diet Order:   Diet, NPO:   Except Medications (22)    Pt previously prescribed Nepro with Carb Steady at 50ml/hr x 24 hrs (6/3-); Nepro with Carb Steady at 30ml/hr x 24 hrs (-6/3). Prior to EN feeds, pt prescribed a Consistent Carbohydrate/Renal diet + LPS 2x daily + Nepro Shakes 1x daily (-). NPO at this time in setting of pending angio/embo.     EN Order Provides (previously): 1200ml, 2160kcal and 97g protein     EN Provision (per nursing flow sheet):   (): 250ml (thus far; feeds now held for pending angio/embo)  (): 800ml (67% of goal)  (): 950ml (79% of goal)  (6/3): 840ml (70% of goal; feeds titrating up towards goal and advanced from 30ml/hr to 50ml/hr)  (): 10ml (feeds initiated)    Nutrition-related concerns:  -Diagnosed with severe protein-calorie malnutrition. Previously on PO diet, now exclusively on EN feeds. Currently NPO for pending angio/embo.   -S/P swallow evaluation 6/3 with recommendations for pt to remain NPO with non-oral means of nutrition/hydration.   -Last BM: () x 1; () x 1; (6/3) x 1. GI team following in setting of gastritis, PUD post endoscopic clipping, rebleeding. Continues on Protonix infusion as prescribed and Carafate.   -Last HD: () with net removal of 500ml fluid. Pt noted with hx of ESRD. Nephrology following.   -Antihyperglycemic regimen: NPH 12u q 6 hrs, insulin lispro sliding scale. A1c 6.6%.   -Continues on Nepro-Cheyanne as prescribed. S/P Retacrit PRN.    -S/P electrolyte repletions: magnesium sulfate (); sodium phosphate (); calcium gluconate (); potassium phosphate ().    Weights:   Daily Weight in k.3 (), Weight in k.3 (), Weight in k.9 (), Weight in k.4 (), Weight in k.9 (), Weight in k.4 (), Weight in k.4 ().  Wt fluctuations expected in setting of ESRD on HD, edema.     MEDICATIONS  (STANDING):  allopurinol  atorvastatin  diltiazem Infusion  insulin lispro (ADMELOG) corrective regimen sliding scale  insulin NPH human recombinant  Nephro-cheyanne  pantoprazole Infusion  sucralfate    Pertinent Labs:  @ 01:28: Na 137, BUN 67<H>, Cr 4.57<H>, <H>, K+ 4.3, Phos 1.7<L>, Mg 1.8, Alk Phos --, ALT/SGPT --, AST/SGOT --, HbA1c --    A1C with Estimated Average Glucose Result: 6.6 % (22 @ 09:40)  A1C with Estimated Average Glucose Result: 10.1 % (22 @ 12:18)    Finger Sticks:  POCT Blood Glucose.: 251 mg/dL ( @ 05:11)  POCT Blood Glucose.: 252 mg/dL ( @ 01:18)  POCT Blood Glucose.: 239 mg/dL ( @ 23:15)  POCT Blood Glucose.: 241 mg/dL ( @ 17:44)  POCT Blood Glucose.: 226 mg/dL ( @ 11:20)    Skin per nursing documentation: surgical incision left forearm/AV fistula creation  Edema: 2+ generalized    Estimated Needs:   [x] no change since previous assessment  30-35 kcal/kg = 9167-2879 kcal/day  1.2-1.4 g/kg =  g pro/day  fluids deferred to team  based on IBW: 78.2 kg    Previous Nutrition Diagnosis: Inadequate oral intake, Increased nutrient needs; Severe acute malnutrition   Nutrition Diagnosis is: [x] ongoing  [] resolved [] not applicable     New Nutrition Diagnosis: [x] Not applicable    Nutrition Care Plan:  [x] In Progress  [] Achieved  [] Not applicable       Recommendations:      1. As able, consider advance Nepro with Carb Steady to GOAL rate 55ml/hr x 24 hrs. Based on IBW 78.2kg, provides: 1320ml, 2376kcal (30.4kcal/kg) and 107g protein (1.37g protein/kg).   2. Monitor GI tolerance. RD to remain available to adjust EN formulary, volume/rate PRN.   3. Continue Nephro-Cheyanne (renal multivitamin) as ordered if no medication contraindications noted.  4. Monitor wt trends/labs/skin integrity/hydration status/bowel regularity.  5. Team to trend electrolytes and replete PRN.     Monitoring and Evaluation:   Continue to monitor nutritional intake, tolerance to diet prescription, weights, labs, skin integrity    RD remains available upon request and will follow up per protocol    Alison Kleiner, RD, [facility] Pager # [phone]

## 2022-06-06 NOTE — PROGRESS NOTE ADULT - PROBLEM SELECTOR PLAN 5
-Carotid duplex with greater than 70% stenosis involving the distal R common carotid artery  -MR head with multiple small acute infarct   -Vacular recs noted, plans for possibly eventual CEA

## 2022-06-06 NOTE — PROGRESS NOTE ADULT - SUBJECTIVE AND OBJECTIVE BOX
SUBJECTIVE:   pt had RRT on 6/4 with decline of mental status  he has not had improvement since that time  transfused overnite 6/5    Medications:  acetaminophen     Tablet .. 650 milliGRAM(s) Oral every 6 hours PRN  ALBUTerol    90 MICROgram(s) HFA Inhaler 2 Puff(s) Inhalation every 6 hours  albuterol/ipratropium for Nebulization 3 milliLiter(s) Nebulizer every 6 hours  allopurinol 100 milliGRAM(s) Oral daily  apixaban 2.5 milliGRAM(s) Oral every 12 hours  aspirin  chewable 81 milliGRAM(s) Oral daily  atorvastatin 40 milliGRAM(s) Oral at bedtime  chlorhexidine 4% Liquid 1 Application(s) Topical <User Schedule>  diltiazem Infusion 5 mG/Hr IV Continuous <Continuous>  erythromycin   Ointment 1 Application(s) Right EYE at bedtime  influenza  Vaccine (HIGH DOSE) 0.7 milliLiter(s) IntraMuscular once  insulin lispro (ADMELOG) corrective regimen sliding scale   SubCutaneous every 6 hours  insulin NPH human recombinant 12 Unit(s) SubCutaneous every 6 hours  lidocaine   4% Patch 1 Patch Transdermal daily  Nephro-reena 1 Tablet(s) Oral daily  ofloxacin 0.3% Solution 1 Drop(s) Right EYE four times a day  pantoprazole Infusion 8 mG/Hr IV Continuous <Continuous>  povidone iodine 10% Solution 1 Application(s) Topical daily  sodium chloride 3%  Inhalation 4 milliLiter(s) Inhalation every 12 hours  sucralfate 1 Gram(s) Oral every 12 hours  trifluridine 1% Solution 1 Drop(s) Right EYE every 3 hours      Labs:  CBC Full  -  ( 06 Jun 2022 07:21 )  WBC Count : 17.74 K/uL  RBC Count : 2.73 M/uL  Hemoglobin : 7.5 g/dL  Hematocrit : 23.9 %  Platelet Count - Automated : 275 K/uL  Mean Cell Volume : 87.5 fl  Mean Cell Hemoglobin : 27.5 pg  Mean Cell Hemoglobin Concentration : 31.4 gm/dL  Auto Neutrophil # : x  Auto Lymphocyte # : x  Auto Monocyte # : x  Auto Eosinophil # : x  Auto Basophil # : x  Auto Neutrophil % : x  Auto Lymphocyte % : x  Auto Monocyte % : x  Auto Eosinophil % : x  Auto Basophil % : x    06-06    137  |  100  |  67<H>  ----------------------------<  242<H>  4.3   |  24  |  4.57<H>    Ca    8.1<L>      06 Jun 2022 01:28  Phos  1.7     06-06  Mg     1.8     06-06    TPro  6.0  /  Alb  2.8<L>  /  TBili  0.4  /  DBili  x   /  AST  11  /  ALT  <5<L>  /  AlkPhos  113  06-04    CAPILLARY BLOOD GLUCOSE      POCT Blood Glucose.: 251 mg/dL (06 Jun 2022 05:11)  POCT Blood Glucose.: 252 mg/dL (06 Jun 2022 01:18)  POCT Blood Glucose.: 239 mg/dL (05 Jun 2022 23:15)  POCT Blood Glucose.: 241 mg/dL (05 Jun 2022 17:44)  POCT Blood Glucose.: 226 mg/dL (05 Jun 2022 11:20)    LIVER FUNCTIONS - ( 04 Jun 2022 22:42 )  Alb: 2.8 g/dL / Pro: 6.0 g/dL / ALK PHOS: 113 U/L / ALT: <5 U/L / AST: 11 U/L / GGT: x           PT/INR - ( 06 Jun 2022 07:21 )   PT: 21.7 sec;   INR: 1.86 ratio         PTT - ( 06 Jun 2022 07:21 )  PTT:31.7 sec    Vitamin B12: -- 06-04-22 @ 22:56  Folate: -- 06-04-22 @ 22:56  Thyroid Function: -- 06-04-22 @ 22:56  Ammonia: 16 umol/L [11 - 55] 06-04-22 @ 22:56  Dilantin: -- 06-04-22 @ 22:56      Vitals:  Vital Signs Last 24 Hrs  T(C): 36.9 (06 Jun 2022 07:00), Max: 37.7 (05 Jun 2022 20:30)  T(F): 98.4 (06 Jun 2022 07:00), Max: 99.9 (05 Jun 2022 20:30)  HR: 98 (06 Jun 2022 08:00) (88 - 150)  BP: 139/63 (06 Jun 2022 08:00) (101/65 - 146/61)  BP(mean): 83 (06 Jun 2022 08:00) (69 - 93)  RR: 27 (06 Jun 2022 08:00) (17 - 33)  SpO2: 93% (06 Jun 2022 08:00) (92% - 100%)  NEUROLOGICAL EXAM:    Mental status: not awake to voice initially when did so was minimal, when ask questions, he mumbles, will not follow commands  calm    Cranial Nerves: Pupils were equal, round, reactive to light. Extraocular movements were intact. B BTT Facial sensation was intact to light touch. There was no facial asymmetry.    right eye closed    Motor exam: Bulk and tone were normal. moves all ext grossly equal    Reflexes: DTRs depressed, flexor plantars.     Sensation: Intact to noxious,    Coordination: no gross dysmetria    Gait: deferred    NIHSS: 20    Imaging:    ACC: [id number] EXAM:  CT BRAIN                        PROCEDURE DATE:  04/09/2022      INTERPRETATION:  CLINICAL INFORMATION:  Altered mental status, on   anticoagulation .    TECHNIQUE: Multiple contiguous axial images were acquired from the   skullbase to the vertex without the administration of intravenous   contrast.  Coronal and sagittal reformations were made.    COMPARISON: CT head 10/21/2021, brain MRI 02/23/2014.    FINDINGS:    Scattered lacunar infarcts in the bilateral cerebralhemispheres are   grossly stable compared to the 10/21/2021 head CT. No new additional   infarcts are noted over the time interval.    No acute intracranial hemorrhage, mass effect, or midline shift. The   brain demonstrates periventricular hypoattenuation, nonspecific in   etiology but likely representing chronic microvascular ischemic changes.    No abnormal extra-axial fluid collections are present.    The ventricles and sulci demonstrate age appropriate involutional   changes.  The basal cisterns are patent.    The orbits are unremarkable. The paranasal sinuses are clear. The mastoid   air cells are clear. The calvarium is intact.    IMPRESSION:    No acute intracranial abnormality is noted. If the patient has new and   persistent symptoms, consider short interval follow-up head CT or brain   MRI.    --- End of Report ---    GATITO VILLARREAL MD; Resident Radiologist  This document has been electronically signed.  JESSE CORONA MD; Attending Radiologist  This document has been electronically signed. Apr 9 2022  2:00PM        ACC: [id number] EXAM:  MR BRAIN                          PROCEDURE DATE:  05/11/2022          INTERPRETATION:  MR brain  without gadolinium    CLINICAL INFORMATION: Stroke.    TECHNIQUE: Limited Sagittal T1-weighted images, axial FLAIR images, and   axial diffusion weighted images of the brain were obtained.  Patient was   unable to hold still for remaining sequences.    FINDINGS:   MRI dated 02/23/2014 available for review.    The brain demonstrates small acute infarctions in the BILATERAL posterior   centrum semiovale and LEFT corona radiata and LEFT posterior   periventricular white matter with restricted diffusion. No intracranial   hemorrhage is recognized. No mass effect is found in the brain.    The ventricles, sulci and basal cisterns show mild global atrophy most   prominent within the BILATERAL cerebellum.    The vertebral and internal carotid arteries demonstrate expected flow   voids indicating their patency.    The orbits are unremarkable.  The paranasal sinuses are clear.  The nasal   cavity appears intact.  The nasopharynx is symmetric.  The central skull   base and temporal bones are intact.  The calvarium appears unremarkable.    IMPRESSION: Small acute infarctions in the BILATERAL posterior centrum   semiovale and LEFT corona radiata and LEFT posterior periventricular   white matter with restricted diffusion.   mild global atrophy most   prominent within the BILATERAL cerebellum.    --- End of Report ---            JESÚS PADGETT MD; Attending Radiologist  This document has been electronically signed. May 11 2022  5:47PM          < from: CT Angio Head w/ IV Cont (05.12.22 @ 13:34) >    ACC: [id number] EXAM:  CT ANGIO NECK (W)AW IC                        ACC: [id number] EXAM:  CT ANGIO BRAIN (W)AW IC                          PROCEDURE DATE:  05/12/2022          INTERPRETATION:  CT angiography of the brain and neck    CLINICAL INDICATION: Recent infarcts. Carotid stenosis.    TECHNIQUE: Direct axial CT scanning of the brain and neck was obtained   from the vertex to the level of the clavicular heads after the dynamic   intravenous injection of 44 cc of Omnipaque 300. 0 cc were discarded.   Sagittal and coronal maximum intensity projection reformats were   provided.  Three-dimensional reconstructions were performed by the   radiologist using the [employer] workstation.    Additionally, noncontrast axial CT scanning of the brain was obtained   from the skull base to the vertex. Sagittal and coronal reformats were   provided.    COMPARISON: MRA neck 10/24/2021    FINDINGS:    CT brain:    Previously seen acute infarcts in the bilateral cerebral hemispheres are   redemonstrated. No CT evidence for hemorrhagic transformation.    No hydrocephalus, midline shift, or effacement of basal cisterns.    No acute intracranial hemorrhage or vasogenic edema.    Mild to moderate white matter microvascular ischemic disease.    CTA brain:    Limited by relatively decreased opacification of the arteries.    Multifocal narrowing of the right skull base ICA due to calcified plaque,   the degree of which is not well evaluated.    Normal flow-related enhancement of the supraclinoid right ICA.    Lack of flow related enhancement of the petrous, precavernous, and   cavernous left ICA. Reconstitution of the vessel at its supraclinoid   segment.    Otherwise no flow-limiting stenosis.    Normal flow-related enhancement of the bilateral anterior, middle, and   posterior cerebral arteries.    Normal flow-related enhancement of the intradural vertebral arteries and   basilar artery.    No vascular aneurysm or AVM.    CTA neck:    Stenoses described in this report are on the basis of NASCET criteria.    Study is significantly limited by relatively decreased opacification of   the arteries.    Short segment stenosis of the mid left common carotid artery due to the   presence of partially calcified plaque is poorly evaluated. Flow-related   enhancement of the more proximal and distal left common carotid artery is   noted.    Long segment stenosis of the mid and distal right common carotid artery   due to the presence of partially calcified plaque is poorly evaluated.    Rapidly progressive stenosis of the left internal carotid artery   beginning at its origin. No flow related enhancement of the vessel beyond   its origin.    Normal flow-related enhancement of the bilateral vertebral arteries.    No gross evidence for acute arterial dissection.    IMPRESSION:    CTA brain:  Limited by relatively decreased opacification of the arteries.    Multifocal narrowing of the right skull base ICA due to calcified plaque,   the degree of which is not well evaluated.    Lack of flow related enhancement of the petrous, precavernous, and   cavernous left ICA. Reconstitution of the vessel at its supraclinoid   segment.    Otherwise no flow-limiting stenosis.    No vascular aneurysm or AVM.    CTA neck:  Limited by relatively decreased opacification of the arteries.    Short segment stenosis of the mid left common carotid artery due to the   presence of partially calcified plaque is poorly evaluated. Flow-related   enhancement of the more proximal and distal left common carotid artery is   noted.    Long segment stenosis of the mid and distal right common carotid artery   due to the presence of partially calcified plaque is poorly evaluated.    Rapidly progressive stenosis of the left internal carotid artery   beginning at its origin. No flow related enhancement of the vessel beyond   its origin.    Recommend correlation with contrast-enhanced MRI of the neck for further   evaluation.    --- End of Report ---            MIGUEL ANGEL CARRILLO MD; Attending Radiologist  This document has been electronically signed. May 12 2022  2:47PM    < end of copied text >      < from: MR Head No Cont (05.31.22 @ 08:42) >    ACC: [id number] EXAM:  MR BRAIN                          PROCEDURE DATE:  05/31/2022          INTERPRETATION:  Clinical history: Code stroke.    Limited MRI was performed using axial diffusion and susceptibility   weighted sequence as well as axial T2-weighted sequence. This exam is   compared with prior brain MRI performed on May 11, 2022.    Scattered areas of abnormal T2 prolongation with restricted diffusion is   seen involving the bilateral corona radiata and centrum semiovale region   as well as the left periatrial region. These findings compatible with   areas of acute infarcts. These findings could be secondary to systemic   etiology. Please correlate clinically.    Parenchymal volume loss and chronic microvascular ischemic changes are   identified    Multiple inflammatory changes seen involving the right mastoid.    The visualized paranasal sinuses mastoid and middle ear regions appear   clear.    IMPRESSION: Acute infarcts as described above.    --- End of Report ---            TOMASA BUTLER MD; Attending Radiologist  This document has been electronically signed. May 31 2022  9:06AM    < end of copied text >

## 2022-06-06 NOTE — PRE PROCEDURE NOTE - PRE PROCEDURE EVALUATION
Procedure: GDA Embolization    Indication: Upper GI Bleed      Clinical History: 67y Male w/ left ICA occlusion on Eliquis and ASA and also UGIB s/p EGD and clipping of duodenal ulcer by GI. IR consulted for downtrending H/H. Patient persistently bleeding. Unresponsive to 2U PRBCs overnight. Now planned for angiogram and embolization of the gastroduodenal artery.       Meds: acetaminophen     Tablet .. 650 milliGRAM(s) Oral every 6 hours PRN  ALBUTerol    90 MICROgram(s) HFA Inhaler 2 Puff(s) Inhalation every 6 hours  albuterol/ipratropium for Nebulization 3 milliLiter(s) Nebulizer every 6 hours  allopurinol 100 milliGRAM(s) Oral daily  apixaban 2.5 milliGRAM(s) Oral every 12 hours  aspirin  chewable 81 milliGRAM(s) Oral daily  atorvastatin 40 milliGRAM(s) Oral at bedtime  diltiazem    Tablet 90 milliGRAM(s) Oral every 8 hours  diltiazem Infusion 5 mG/Hr IV Continuous <Continuous>  erythromycin   Ointment 1 Application(s) Right EYE at bedtime  influenza  Vaccine (HIGH DOSE) 0.7 milliLiter(s) IntraMuscular once  insulin lispro (ADMELOG) corrective regimen sliding scale   SubCutaneous every 6 hours  insulin NPH human recombinant 8 Unit(s) SubCutaneous every 6 hours  lidocaine   4% Patch 1 Patch Transdermal daily  Nephro-reena 1 Tablet(s) Oral daily  ofloxacin 0.3% Solution 1 Drop(s) Right EYE four times a day  pantoprazole Infusion 8 mG/Hr IV Continuous <Continuous>  povidone iodine 10% Solution 1 Application(s) Topical daily  sodium chloride 3%  Inhalation 4 milliLiter(s) Inhalation every 12 hours  sucralfate 1 Gram(s) Oral every 12 hours  trifluridine 1% Solution 1 Drop(s) Right EYE every 3 hours      Allergies:nitrofurantoin (Nephrotoxicity)        Labs:                           6.8    19.23 )-----------( 273      ( 06 Jun 2022 15:04 )             21.6     PT/INR - ( 06 Jun 2022 07:21 )   PT: 21.7 sec;   INR: 1.86 ratio         PTT - ( 06 Jun 2022 07:21 )  PTT:31.7 sec  06-06    137  |  100  |  67<H>  ----------------------------<  242<H>  4.3   |  24  |  4.57<H>    Ca    8.1<L>      06 Jun 2022 01:28  Phos  1.7     06-06  Mg     1.8     06-06    TPro  6.0  /  Alb  2.8<L>  /  TBili  0.4  /  DBili  x   /  AST  11  /  ALT  <5<L>  /  AlkPhos  113  06-04        Physical Exam: NAD, LUE AV Fistula with palpable Thrill      Anesthesia: Sedation by Anesthesia

## 2022-06-06 NOTE — PROGRESS NOTE ADULT - SUBJECTIVE AND OBJECTIVE BOX
[facility]   [phone]  RENAL FOLLOW UP NOTE  --------------------------------------------------------------------------------  Chief Complaint:    24 hour events/subjective:    seen earlier. still with intermittent sob   left knee pain is better per pt     PAST HISTORY  --------------------------------------------------------------------------------  No significant changes to PMH, PSH, FHx, SHx, unless otherwise noted    ALLERGIES & MEDICATIONS  --------------------------------------------------------------------------------  Allergies    No Known Allergies    Intolerances      Standing Inpatient Medications  aspirin enteric coated 81 milliGRAM(s) Oral daily  buMETAnide 2 milliGRAM(s) Oral daily  colchicine 0.6 milliGRAM(s) Oral daily  dextrose 5%. 1000 milliLiter(s) IV Continuous <Continuous>  dextrose 50% Injectable 12.5 Gram(s) IV Push once  dextrose 50% Injectable 25 Gram(s) IV Push once  dextrose 50% Injectable 25 Gram(s) IV Push once  diltiazem    Tablet 30 milliGRAM(s) Oral three times a day  enoxaparin Injectable 130 milliGRAM(s) SubCutaneous two times a day  influenza   Vaccine 0.5 milliLiter(s) IntraMuscular once  insulin glargine Injectable (LANTUS) 35 Unit(s) SubCutaneous at bedtime  insulin glargine Injectable (LANTUS) 35 Unit(s) SubCutaneous every morning  insulin lispro (HumaLOG) corrective regimen sliding scale   SubCutaneous at bedtime  insulin lispro (HumaLOG) corrective regimen sliding scale   SubCutaneous three times a day before meals  insulin lispro Injectable (HumaLOG) 22 Unit(s) SubCutaneous before dinner  insulin lispro Injectable (HumaLOG) 20 Unit(s) SubCutaneous before breakfast  insulin lispro Injectable (HumaLOG) 12 Unit(s) SubCutaneous before lunch  metolazone 2.5 milliGRAM(s) Oral daily  metoprolol succinate  milliGRAM(s) Oral daily  pregabalin 100 milliGRAM(s) Oral two times a day  senna 2 Tablet(s) Oral at bedtime    PRN Inpatient Medications  acetaminophen   Tablet .. 650 milliGRAM(s) Oral every 4 hours PRN  dextrose 40% Gel 15 Gram(s) Oral once PRN  glucagon  Injectable 1 milliGRAM(s) IntraMuscular once PRN  oxyCODONE    IR 5 milliGRAM(s) Oral two times a day PRN  sodium chloride 0.65% Nasal 1 Spray(s) Both Nostrils three times a day PRN      REVIEW OF SYSTEMS  --------------------------------------------------------------------------------    Gen: denies  fevers/chills,  CVS: denies chest pain/palpitations  Resp: intermittent SOB/Cough -   GI: Denies N/V/Abd pain  : Denies dysuria/oliguria/hematuria    All other systems were reviewed and are negative, except as noted.    VITALS/PHYSICAL EXAM  --------------------------------------------------------------------------------  T(C): 36.6 (11-17-19 @ 15:33), Max: 36.8 (11-16-19 @ 23:57)  HR: 81 (11-17-19 @ 15:33) (78 - 97)  BP: 135/66 (11-17-19 @ 15:33) (114/63 - 155/91)  RR: 18 (11-17-19 @ 15:33) (18 - 18)  SpO2: 94% (11-17-19 @ 15:33) (94% - 98%)  Wt(kg): --  Height (cm): 180.3 (11-17-19 @ 18:01)  Weight (kg): 131.6 (11-17-19 @ 18:01)  BMI (kg/m2): 40.5 (11-17-19 @ 18:01)  BSA (m2): 2.47 (11-17-19 @ 18:01)      11-16-19 @ 07:01  -  11-17-19 @ 07:00  --------------------------------------------------------  IN: 975 mL / OUT: 1350 mL / NET: -375 mL    11-17-19 @ 07:01  -  11-17-19 @ 19:07  --------------------------------------------------------  IN: 440 mL / OUT: 900 mL / NET: -460 mL      Physical Exam:  	  Gen:  + obese appears comfortable  on O2    	no jvd   	Pulm: decrease bs  R base upto 3/4  no rales or ronchi or wheezing   	CV: RRR, S1S2; no rub  	Abd: +BS, soft, nontender/nondistended  	: No suprapubic tenderness  	UE: Warm, no cyanosis  no clubbing,  no edema  	LE: Warm, no cyanosis  no clubbing, no edema	  	  LABS/STUDIES  --------------------------------------------------------------------------------              14.7   7.84  >-----------<  295      [11-17-19 @ 18:25]              48.2     136  |  89  |  66  ----------------------------<  227      [11-17-19 @ 07:40]  4.3   |  30  |  1.89        Ca     9.4     [11-17-19 @ 07:40]      PT/INR: PT 12.1 , INR 1.09       [11-17-19 @ 18:25]  PTT: 36.8       [11-17-19 @ 18:25]      Creatinine Trend:  SCr 1.89 [11-17 @ 07:40]  SCr 2.10 [11-16 @ 06:42]  SCr 2.08 [11-15 @ 08:03]  SCr 2.17 [11-14 @ 06:24]  SCr 1.79 [11-13 @ 06:43]                  HbA1c 11.7      [11-07-19 @ 09:14]  TSH 1.71      [12-28-18 @ 14:58] DISPLAY PLAN FREE TEXT DISPLAY PLAN FREE TEXT DISPLAY PLAN FREE TEXT DISPLAY PLAN FREE TEXT DISPLAY PLAN FREE TEXT

## 2022-06-06 NOTE — PRE PROCEDURE NOTE - GENERAL PROCEDURE NAME
Conversion of non-tunneled to tunneled HD catheter
EGD
Shiley placement
Mesenteric Angiogram and GDA Embolization

## 2022-06-06 NOTE — PROGRESS NOTE ADULT - ASSESSMENT
67 year old gentleman with PMH of CAD, NSTEMI s/p 3 stents in 2014 (stents to LAD, proximal and distal LCx), ischemic cardiomyopathy, HTN for 10 years, T2DM for 26 years c/b peripheral neuropathy, CVA, TIA, Afib on Pradaxa, chronic RLE wound, L carotid stenosis s/p endarterectomy (2014) just recently admitted  to the [facility] on 2/19/2022 with acute onset chest pain for one day found to have an NSTEMI, CAD, s/p PCI in setting of increased AF rates off bb for 3 days and resolved chest pain, his CKMB peaked and Echo noted with EF 60%,normal LV function, mild TR, mild OK. He was s/p LHC with 85% proximal LAD s/p balloon angioplasty and LULU. He presented to [facility] ED with decreased urine output over the week. [facility]. Stevens [facility] to [facility] for hematuria and was started  on Nitrofurantoin. Pt is still taking Nitrofurantoin w/ 5 days left. He came to the ED due to worsening symptoms. Pt reports having a similar incident 4-5 years ago that resolved spontaneously. He reports increased leg edema Dyspnea worse on exertion. his baseline Serum creatinine is in the 2.0 to 2.3 mg/dl range. ANT with serum creatinine of 8.29 with bun 144 and k 5.5      1- ESRD   2- chf chronic   3- hyperphosphatemia /shpt   4- anemia   5- hyperlipidemia   6- HTN /a fib  7- TIA hx   8- hx of carotid stenosis  9- GIB      No plan for HD today  last HD  URR in range at 67%, clearances in range.  anemia - required prbc  5/31 hb is lower again prbc as needed keep hb > 8   epogen 02601 Units TIW with HD.    cardizem drip  ppi drip  phos supplementation, renvela has been on hold  tube feeds

## 2022-06-06 NOTE — PROGRESS NOTE ADULT - SUBJECTIVE AND OBJECTIVE BOX
[facility]   [phone]  RENAL FOLLOW UP NOTE  --------------------------------------------------------------------------------  Chief Complaint:    24 hour events/subjective:    patient seen and examined with Dr. Cordova  awake, confused.    PAST HISTORY  --------------------------------------------------------------------------------  No significant changes to PMH, PSH, FHx, SHx, unless otherwise noted    ALLERGIES & MEDICATIONS  --------------------------------------------------------------------------------  Allergies    nitrofurantoin (Nephrotoxicity)    Intolerances      Standing Inpatient Medications  ALBUTerol    90 MICROgram(s) HFA Inhaler 2 Puff(s) Inhalation every 6 hours  albuterol/ipratropium for Nebulization 3 milliLiter(s) Nebulizer every 6 hours  allopurinol 100 milliGRAM(s) Oral daily  apixaban 2.5 milliGRAM(s) Oral every 12 hours  aspirin  chewable 81 milliGRAM(s) Oral daily  atorvastatin 40 milliGRAM(s) Oral at bedtime  diltiazem    Tablet 90 milliGRAM(s) Oral every 8 hours  diltiazem Infusion 5 mG/Hr IV Continuous <Continuous>  erythromycin   Ointment 1 Application(s) Right EYE at bedtime  influenza  Vaccine (HIGH DOSE) 0.7 milliLiter(s) IntraMuscular once  insulin lispro (ADMELOG) corrective regimen sliding scale   SubCutaneous every 6 hours  insulin NPH human recombinant 8 Unit(s) SubCutaneous every 6 hours  lidocaine   4% Patch 1 Patch Transdermal daily  Nephro-reena 1 Tablet(s) Oral daily  ofloxacin 0.3% Solution 1 Drop(s) Right EYE four times a day  pantoprazole Infusion 8 mG/Hr IV Continuous <Continuous>  povidone iodine 10% Solution 1 Application(s) Topical daily  sodium chloride 3%  Inhalation 4 milliLiter(s) Inhalation every 12 hours  sucralfate 1 Gram(s) Oral every 12 hours  trifluridine 1% Solution 1 Drop(s) Right EYE every 3 hours    PRN Inpatient Medications  acetaminophen     Tablet .. 650 milliGRAM(s) Oral every 6 hours PRN      REVIEW OF SYSTEMS  --------------------------------------------------------------------------------    Patient is unable to give ROS    VITALS/PHYSICAL EXAM  --------------------------------------------------------------------------------  T(C): 37 (06-06-22 @ 11:00), Max: 37.7 (06-05-22 @ 20:30)  HR: 97 (06-06-22 @ 14:00) (88 - 150)  BP: 136/58 (06-06-22 @ 14:00) (101/65 - 154/57)  RR: 24 (06-06-22 @ 14:00) (17 - 33)  SpO2: 96% (06-06-22 @ 14:00) (92% - 100%)  Wt(kg): --  Height (cm): 180.3 (06-06-22 @ 06:36)  Weight (kg): 120.9 (06-06-22 @ 06:36)  BMI (kg/m2): 37.2 (06-06-22 @ 06:36)  BSA (m2): 2.38 (06-06-22 @ 06:36)      06-05-22 @ 07:01  -  06-06-22 @ 07:00  --------------------------------------------------------  IN: 2338.3 mL / OUT: 0 mL / NET: 2338.3 mL    06-06-22 @ 07:01  -  06-06-22 @ 14:29  --------------------------------------------------------  IN: 135 mL / OUT: 0 mL / NET: 135 mL      Physical Exam:  	              Gen: awake, +B/L wrist restraints  	Pulm: Decreased breath sounds b/l bases.  	CV: No JVD. IRR,  	Abd: +BS, soft, nontender, softly distended, obese, +NGT               Extremity no edema              Extremity LE: + hyperpigmented bilateral LE with no edema              Vascular: R IJ HD catheter, L arm AVF     LABS/STUDIES  --------------------------------------------------------------------------------              7.5    17.74 >-----------<  275      [06-06-22 @ 07:21]              23.9     137  |  100  |  67  ----------------------------<  242      [06-06-22 @ 01:28]  4.3   |  24  |  4.57        Ca     8.1     [06-06-22 @ 01:28]      Mg     1.8     [06-06-22 @ 01:28]      Phos  1.7     [06-06-22 @ 01:28]    TPro  6.0  /  Alb  2.8  /  TBili  0.4  /  DBili  x   /  AST  11  /  ALT  <5  /  AlkPhos  113  [06-04-22 @ 22:42]    PT/INR: PT 21.7 , INR 1.86       [06-06-22 @ 07:21]  PTT: 31.7       [06-06-22 @ 07:21]    CK 28      [06-04-22 @ 22:42]    Creatinine Trend:  SCr 4.57 [06-06 @ 01:28]  SCr 2.92 [06-04 @ 22:42]  SCr 4.50 [06-03 @ 21:41]  SCr 3.01 [06-02 @ 22:44]  SCr 5.46 [06-01 @ 22:06]                  Iron 21, TIBC 245, %sat 9      [05-17-22 @ 05:54]  Ferritin 120      [05-17-22 @ 05:54]  PTH -- (Ca 8.0)      [06-04-22 @ 13:30]   66  PTH -- (Ca 8.1)      [06-02-22 @ 15:14]   75  PTH -- (Ca 8.1)      [05-28-22 @ 13:06]   96  PTH -- (Ca 8.5)      [05-24-22 @ 14:22]   84  PTH -- (Ca 8.3)      [04-15-22 @ 12:18]   150  PTH -- (Ca --)      [04-03-22 @ 23:06]   97  HbA1c 11.7      [11-07-19 @ 09:14]  TSH 3.08      [06-04-22 @ 22:54]

## 2022-06-06 NOTE — PRE PROCEDURE NOTE - PROCEDURE SERVICE
Vascular and Interventional Radiology
Vascular and Interventional Radiology
Endoscopy
Vascular and Interventional Radiology

## 2022-06-06 NOTE — PROGRESS NOTE ADULT - SUBJECTIVE AND OBJECTIVE BOX
CARDIOLOGY FOLLOW UP - Dr. Mazariegos  Date of Service: 6/6/2022  CC: events noted, continues to bleed ,awaiting possible IR embolization    Review of Systems:  Constitutional: No fever, weight loss, or fatigue  Respiratory: No cough, wheezing, or hemoptysis, no shortness of breath  Cardiovascular: No chest pain, palpitations, passing out, dizziness, or leg swelling  Gastrointestinal: No abd or epigastric pain. No nausea, vomiting, or hematemesis; no diarrhea or consiptaiton, no melena or hematochezia  Vascular: No edema     TELEMETRY:    PHYSICAL EXAM:  T(C): 37 (06-06-22 @ 11:00), Max: 37.7 (06-05-22 @ 20:30)  HR: 97 (06-06-22 @ 14:00) (88 - 150)  BP: 136/58 (06-06-22 @ 14:00) (101/65 - 154/57)  RR: 24 (06-06-22 @ 14:00) (17 - 33)  SpO2: 96% (06-06-22 @ 14:00) (92% - 100%)  Wt(kg): --  I&O's Summary    05 Jun 2022 07:01  -  06 Jun 2022 07:00  --------------------------------------------------------  IN: 2338.3 mL / OUT: 0 mL / NET: 2338.3 mL    06 Jun 2022 07:01  -  06 Jun 2022 14:21  --------------------------------------------------------  IN: 135 mL / OUT: 0 mL / NET: 135 mL        Appearance: Normal	  Cardiovascular: Normal S1 S2,RRR, No JVD, No murmurs  Respiratory: Lungs clear to auscultation	  Gastrointestinal:  Soft, Non-tender, + BS	  Extremities: Normal range of motion, No clubbing, cyanosis or edema  Vascular: Peripheral pulses palpable 2+ bilaterally       Home Medications:  allopurinol 100 mg oral tablet: 1 tab(s) orally once a day (03 Apr 2022 06:34)  aspirin 81 mg oral delayed release tablet: 1 tab(s) orally once a day (03 Apr 2022 06:34)  bumetanide 2 mg oral tablet: 1 tab(s) orally once a day (03 Apr 2022 06:34)  insulin glargine 100 units/mL subcutaneous solution: 25 unit(s) subcutaneous once a day (at bedtime) (03 Apr 2022 06:34)  metOLazone 5 mg oral tablet: 1 tab(s) orally 3 times a week (03 Apr 2022 06:34)  metoprolol succinate 50 mg oral tablet, extended release: 2 tab(s) orally once a day (03 Apr 2022 06:34)  NovoLOG 100 units/mL subcutaneous solution: subcutaneous 4 times a day (before meals and at bedtime) (03 Apr 2022 06:34)  NovoLOG FlexPen 100 units/mL injectable solution: 10 unit(s) subcutaneous 3 times a day (03 Apr 2022 06:34)  oxycodone-acetaminophen 5 mg-325 mg oral tablet: 1 tab(s) orally 2 times a day (03 Apr 2022 06:34)  Pradaxa 150 mg oral capsule: 1 cap(s) orally 2 times a day (03 Apr 2022 06:34)  pregabalin 100 mg oral capsule: 1 cap(s) orally 3 times a day (03 Apr 2022 06:34)        MEDICATIONS  (STANDING):  ALBUTerol    90 MICROgram(s) HFA Inhaler 2 Puff(s) Inhalation every 6 hours  albuterol/ipratropium for Nebulization 3 milliLiter(s) Nebulizer every 6 hours  allopurinol 100 milliGRAM(s) Oral daily  apixaban 2.5 milliGRAM(s) Oral every 12 hours  aspirin  chewable 81 milliGRAM(s) Oral daily  atorvastatin 40 milliGRAM(s) Oral at bedtime  diltiazem    Tablet 90 milliGRAM(s) Oral every 8 hours  diltiazem Infusion 5 mG/Hr (5 mL/Hr) IV Continuous <Continuous>  erythromycin   Ointment 1 Application(s) Right EYE at bedtime  influenza  Vaccine (HIGH DOSE) 0.7 milliLiter(s) IntraMuscular once  insulin lispro (ADMELOG) corrective regimen sliding scale   SubCutaneous every 6 hours  insulin NPH human recombinant 8 Unit(s) SubCutaneous every 6 hours  lidocaine   4% Patch 1 Patch Transdermal daily  Nephro-reena 1 Tablet(s) Oral daily  ofloxacin 0.3% Solution 1 Drop(s) Right EYE four times a day  pantoprazole Infusion 8 mG/Hr (10 mL/Hr) IV Continuous <Continuous>  povidone iodine 10% Solution 1 Application(s) Topical daily  sodium chloride 3%  Inhalation 4 milliLiter(s) Inhalation every 12 hours  sucralfate 1 Gram(s) Oral every 12 hours  trifluridine 1% Solution 1 Drop(s) Right EYE every 3 hours        EKG:  RADIOLOGY:  DIAGNOSTIC TESTING:  [ ] Echocardiogram:  [ ] Catherterization:  [ ] Stress Test:  OTHER:     LABS:	 	                          7.5    17.74 )-----------( 275      ( 06 Jun 2022 07:21 )             23.9     06-06    137  |  100  |  67<H>  ----------------------------<  242<H>  4.3   |  24  |  4.57<H>    Ca    8.1<L>      06 Jun 2022 01:28  Phos  1.7     06-06  Mg     1.8     06-06    TPro  6.0  /  Alb  2.8<L>  /  TBili  0.4  /  DBili  x   /  AST  11  /  ALT  <5<L>  /  AlkPhos  113  06-04      PT/INR - ( 06 Jun 2022 07:21 )   PT: 21.7 sec;   INR: 1.86 ratio         PTT - ( 06 Jun 2022 07:21 )  PTT:31.7 sec    CARDIAC MARKERS:

## 2022-06-06 NOTE — PROGRESS NOTE ADULT - ASSESSMENT
ASSESSMENT/PLAN:    NEURO:  Neuro Checks Q 4hr  ASA 81 mg OD  Eliquis to 5 mg OD  family opt for CEA w/ Dr. RAMOS    PULM:   RA  aspiration precautions     CV:   Afib, CAD  SBP goal 120-180  Atorvastatin 40 mg OD  d/c metoprolol c/d diltiazem 30mg q6   Eliquis 5 mg BID     RENAL:  Fluids: IVL   ESRD on HD - Tues thurs sat     GI:  NGT, tube feeding  Melena  Gastritis, PUD post endoscopic clipping 06/01/2022  Consider angio and embolization in case of rebleeding   protonix BID  Bowel regimen [] colace [x] senna [x] other: miralax    ENDO:   Goal euglycemia (-180)  ISS    HEME/ONC:  VTE prophylaxis: [x] SCDs   Eliquis 5 mg BID, asa    ID:   Monitor for fever   HSV Keratitis -     CODE STATUS:  [x] Full Code [] DNR [] DNI [] Palliative/Comfort Care    DISPOSITION:  [x] ICU [] Stroke Unit [] Floor [] EMU [] RCU [] PCU    [x] Patient is at high risk of neurologic deterioration/death due to: sepsis due to unknown organism, hypotension, cerebral hypoperfusion, acute stroke, altered mental status, respiratory failure requiring intubation     Contact: [phone]   ASSESSMENT/PLAN:  acute ischemic stroke, has occlusion of left ICA and right ICA stenosis   now with upper GI bleed and a.fib     NEURO:  Neuro Checks Q 4hr  ASA 81 mg OD  Eliquis to 5 mg OD  possible CEA     PULM:   RA  aspiration precautions   frequent suctioned     CV:   Afib, CAD  SBP goal 120-180 mmhg   Atorvastatin 40 mg OD  a.fib with RVR, on cardizem 10 mg/hr, start cardizem 90 mg q 8 hr   Eliquis 5 mg BID     RENAL:  Fluids: IVL   ESRD on HD - Tues thurs sat     GI:  NGT, tube feeding  NPO for angio   upper GI bleed on pantoprazol drip   Gastritis, PUD post endoscopic clipping 06/01/2022  melena overnight     ENDO:   Goal euglycemia (-180)  NPH 6 units q 6 hr while NPO   ISS    HEME/ONC:  VTE prophylaxis: [x] SCDs   Eliquis 5 mg BID, asa  cbc q 8 hr     ID:   afebrile   HSV Keratitis - opthalmology on consult      CODE STATUS:  [x] Full Code [] DNR [] DNI [] Palliative/Comfort Care    DISPOSITION:  [x] ICU [] Stroke Unit [] Floor [] EMU [] RCU [] PCU    [x] Patient is at high risk of neurologic deterioration/death due to: sepsis due to unknown organism, hypotension, cerebral hypoperfusion, acute stroke, altered mental status, respiratory failure requiring intubation     Contact: [phone]

## 2022-06-06 NOTE — PROGRESS NOTE ADULT - SUBJECTIVE AND OBJECTIVE BOX
HPI:  Patient remotely known to me from an admission to [place] in Feb 2017--assigned to me at this point by the ER to admit this 68 y/o M--followed by his nephrologist above--patient with a history of CAD with past multiple PCI, with most recent discharge from [place] in Feb 2022 for non ST elevation MI with LULU of an 85% prox LAD lesion with patient on ASA and now off Plavix per cardiology (only for one month), chronic atrial fibrillation maintained on Pradaxa, essential HTN, type 2 DM previously on oral medications but on insulin, diabetic neuropathy with chronic RIGHT LE venous stasis changes>left, LEFT foot ulcer seen by podiatry, past endarterectomy LEFT CEA in 2014. with patient self referring to the ER following apparently treatment by an urgent care center for an apparent cystitis with hematuria on nitrofurantoin but with patient noting decreased urine output for the past week, and difficulty with B/L coarse tremors for several weeks, with patient having difficulty negotiating his applications on his smart phone (observed by examiner).  Patient with increasing B/L LE oedema and weight gain, with patient with 2 pillow orthopnoea.    OVERNIGHT EVENTS:   No acute events overnight.    VITALS:  T(C): , Max: 37.7 (06-05-22 @ 20:30)  HR:  (88 - 150)  BP:  (101/65 - 146/61)  ABP: --  RR:  (17 - 33)  SpO2:  (92% - 100%)  Wt(kg): --      06-04-22 @ 07:01  -  06-05-22 @ 07:00  --------------------------------------------------------  IN: 1270 mL / OUT: 0 mL / NET: 1270 mL    06-05-22 @ 07:01  -  06-06-22 @ 06:44  --------------------------------------------------------  IN: 2338.3 mL / OUT: 0 mL / NET: 2338.3 mL      LABS:  Na: 137 (06-06 @ 01:28), 138 (06-04 @ 22:42), 135 (06-03 @ 21:41)  K: 4.3 (06-06 @ 01:28), 4.2 (06-04 @ 22:42), 3.8 (06-03 @ 21:41)  Cl: 100 (06-06 @ 01:28), 101 (06-04 @ 22:42), 98 (06-03 @ 21:41)  CO2: 24 (06-06 @ 01:28), 25 (06-04 @ 22:42), 26 (06-03 @ 21:41)  BUN: 67 (06-06 @ 01:28), 31 (06-04 @ 22:42), 17 (06-04 @ 17:58), 51 (06-04 @ 13:29), 25 (06-03 @ 21:41)  Cr: 4.57 (06-06 @ 01:28), 2.92 (06-04 @ 22:42), 4.50 (06-03 @ 21:41)  Glu: 242(06-06 @ 01:28), 204(06-04 @ 22:42), 205(06-03 @ 21:41)    Hgb: 7.6 (06-06 @ 01:28), 7.0 (06-05 @ 17:46), 7.5 (06-05 @ 11:33), 7.7 (06-04 @ 22:42), 7.8 (06-04 @ 13:29), 8.4 (06-04 @ 06:07), 8.6 (06-03 @ 21:41), 9.0 (06-03 @ 14:17)  Hct: 24.4 (06-06 @ 01:28), 22.3 (06-05 @ 17:46), 24.8 (06-05 @ 11:33), 25.7 (06-04 @ 22:42), 25.6 (06-04 @ 13:29), 28.1 (06-04 @ 06:07), 28.5 (06-03 @ 21:41), 29.5 (06-03 @ 14:17)  WBC: 18.13 (06-06 @ 01:28), 18.65 (06-05 @ 17:46), 17.70 (06-05 @ 11:33), 17.99 (06-04 @ 22:42), 12.94 (06-04 @ 13:29), 11.85 (06-04 @ 06:07), 11.10 (06-03 @ 21:41), 12.92 (06-03 @ 14:17)  Plt: 264 (06-06 @ 01:28), 275 (06-05 @ 17:46), 268 (06-05 @ 11:33), 276 (06-04 @ 22:42), 266 (06-04 @ 13:29), 256 (06-04 @ 06:07), 246 (06-03 @ 21:41), 255 (06-03 @ 14:17)    INR: 2.47 06-04-22 @ 22:42  PTT: 32.7 06-04-22 @ 22:42    IMAGING:   Recent imaging studies were reviewed.    MEDICATIONS:  acetaminophen     Tablet .. 650 milliGRAM(s) Oral every 6 hours PRN  ALBUTerol    90 MICROgram(s) HFA Inhaler 2 Puff(s) Inhalation every 6 hours  albuterol/ipratropium for Nebulization 3 milliLiter(s) Nebulizer every 6 hours  allopurinol 100 milliGRAM(s) Oral daily  apixaban 2.5 milliGRAM(s) Oral every 12 hours  aspirin  chewable 81 milliGRAM(s) Oral daily  atorvastatin 40 milliGRAM(s) Oral at bedtime  chlorhexidine 4% Liquid 1 Application(s) Topical <User Schedule>  diltiazem Infusion 5 mG/Hr IV Continuous <Continuous>  erythromycin   Ointment 1 Application(s) Right EYE at bedtime  influenza  Vaccine (HIGH DOSE) 0.7 milliLiter(s) IntraMuscular once  insulin lispro (ADMELOG) corrective regimen sliding scale   SubCutaneous every 6 hours  insulin NPH human recombinant 12 Unit(s) SubCutaneous every 6 hours  lidocaine   4% Patch 1 Patch Transdermal daily  Nephro-reena 1 Tablet(s) Oral daily  ofloxacin 0.3% Solution 1 Drop(s) Right EYE four times a day  pantoprazole Infusion 8 mG/Hr IV Continuous <Continuous>  povidone iodine 10% Solution 1 Application(s) Topical daily  sodium chloride 3%  Inhalation 4 milliLiter(s) Inhalation every 12 hours  sucralfate 1 Gram(s) Oral every 12 hours  trifluridine 1% Solution 1 Drop(s) Right EYE every 3 hours    EXAMINATION:  General:  calm  HEENT:  Right conjunctival discharge and congestion   Neuro:  Awake, Agitated, oriented x 0, following commands, Left side AG, right sided weakness UE 3/5, LE 2/5  Cards:  RRR  Respiratory:  no respiratory distress  Adomen:  soft  Extremities:  ischemic changes   Skin:  warm/dry   HPI:  Patient remotely known to me from an admission to [facility] in Feb 2017--assigned to me at this point by the ER to admit this 66 y/o M--followed by his nephrologist above--patient with a history of CAD with past multiple PCI, with most recent discharge from [place] in Feb 2022 for non ST elevation MI with LULU of an 85% prox LAD lesion with patient on ASA and now off Plavix per cardiology (only for one month), chronic atrial fibrillation maintained on Pradaxa, essential HTN, type 2 DM previously on oral medications but on insulin, diabetic neuropathy with chronic RIGHT LE venous stasis changes>left, LEFT foot ulcer seen by podiatry, past endarterectomy LEFT CEA in 2014. with patient self referring to the ER following apparently treatment by an urgent care center for an apparent cystitis with hematuria on nitrofurantoin but with patient noting decreased urine output for the past week, and difficulty with B/L coarse tremors for several weeks, with patient having difficulty negotiating his applications on his smart phone (observed by examiner).  Patient with increasing B/L LE oedema and weight gain, with patient with 2 pillow orthopnoea.    OVERNIGHT EVENTS:   s/p 2 packed RBC   has melena       T(C): 37 (06-06-22 @ 11:00), Max: 37.7 (06-05-22 @ 20:30)  HR: 100 (06-06-22 @ 11:00) (88 - 150)  BP: 128/57 (06-06-22 @ 11:00) (101/65 - 154/57)  RR: 21 (06-06-22 @ 11:00) (17 - 33)  SpO2: 95% (06-06-22 @ 11:00) (92% - 100%)  06-05-22 @ 07:01  -  06-06-22 @ 07:00  --------------------------------------------------------  IN: 2338.3 mL / OUT: 0 mL / NET: 2338.3 mL    06-06-22 @ 07:01  -  06-06-22 @ 12:28  --------------------------------------------------------  IN: 25 mL / OUT: 0 mL / NET: 25 mL    acetaminophen     Tablet .. 650 milliGRAM(s) Oral every 6 hours PRN  ALBUTerol    90 MICROgram(s) HFA Inhaler 2 Puff(s) Inhalation every 6 hours  albuterol/ipratropium for Nebulization 3 milliLiter(s) Nebulizer every 6 hours  allopurinol 100 milliGRAM(s) Oral daily  apixaban 2.5 milliGRAM(s) Oral every 12 hours  aspirin  chewable 81 milliGRAM(s) Oral daily  atorvastatin 40 milliGRAM(s) Oral at bedtime  chlorhexidine 4% Liquid 1 Application(s) Topical <User Schedule>  diltiazem Infusion 5 mG/Hr IV Continuous <Continuous>  erythromycin   Ointment 1 Application(s) Right EYE at bedtime  influenza  Vaccine (HIGH DOSE) 0.7 milliLiter(s) IntraMuscular once  insulin lispro (ADMELOG) corrective regimen sliding scale   SubCutaneous every 6 hours  insulin NPH human recombinant 12 Unit(s) SubCutaneous every 6 hours  lidocaine   4% Patch 1 Patch Transdermal daily  Nephro-reena 1 Tablet(s) Oral daily  ofloxacin 0.3% Solution 1 Drop(s) Right EYE four times a day  pantoprazole Infusion 8 mG/Hr IV Continuous <Continuous>  povidone iodine 10% Solution 1 Application(s) Topical daily  sodium chloride 3%  Inhalation 4 milliLiter(s) Inhalation every 12 hours  sucralfate 1 Gram(s) Oral every 12 hours  trifluridine 1% Solution 1 Drop(s) Right EYE every 3 hours        EXAMINATION:  General:  calm  HEENT:  Right conjunctival discharge and congestion   Neuro:  Awake, Agitated, oriented x 0, following commands, Left side AG, right sided weakness UE 3/5, LE 2/5  Cards:  RRR  Respiratory:  no respiratory distress  Adomen:  soft  Extremities:  ischemic changes   Skin:  warm/dry        LABS:  Na: 137 (06-06 @ 01:28), 138 (06-04 @ 22:42), 135 (06-03 @ 21:41)  K: 4.3 (06-06 @ 01:28), 4.2 (06-04 @ 22:42), 3.8 (06-03 @ 21:41)  Cl: 100 (06-06 @ 01:28), 101 (06-04 @ 22:42), 98 (06-03 @ 21:41)  CO2: 24 (06-06 @ 01:28), 25 (06-04 @ 22:42), 26 (06-03 @ 21:41)  BUN: 67 (06-06 @ 01:28), 31 (06-04 @ 22:42), 17 (06-04 @ 17:58), 51 (06-04 @ 13:29), 25 (06-03 @ 21:41)  Cr: 4.57 (06-06 @ 01:28), 2.92 (06-04 @ 22:42), 4.50 (06-03 @ 21:41)  Glu: 242(06-06 @ 01:28), 204(06-04 @ 22:42), 205(06-03 @ 21:41)    Hgb: 7.5 (06-06 @ 07:21), 7.6 (06-06 @ 01:28), 7.0 (06-05 @ 17:46), 7.5 (06-05 @ 11:33), 7.7 (06-04 @ 22:42), 7.8 (06-04 @ 13:29), 8.4 (06-04 @ 06:07), 8.6 (06-03 @ 21:41), 9.0 (06-03 @ 14:17)  Hct: 23.9 (06-06 @ 07:21), 24.4 (06-06 @ 01:28), 22.3 (06-05 @ 17:46), 24.8 (06-05 @ 11:33), 25.7 (06-04 @ 22:42), 25.6 (06-04 @ 13:29), 28.1 (06-04 @ 06:07), 28.5 (06-03 @ 21:41), 29.5 (06-03 @ 14:17)  WBC: 17.74 (06-06 @ 07:21), 18.13 (06-06 @ 01:28), 18.65 (06-05 @ 17:46), 17.70 (06-05 @ 11:33), 17.99 (06-04 @ 22:42), 12.94 (06-04 @ 13:29), 11.85 (06-04 @ 06:07), 11.10 (06-03 @ 21:41), 12.92 (06-03 @ 14:17)  Plt: 275 (06-06 @ 07:21), 264 (06-06 @ 01:28), 275 (06-05 @ 17:46), 268 (06-05 @ 11:33), 276 (06-04 @ 22:42), 266 (06-04 @ 13:29), 256 (06-04 @ 06:07), 246 (06-03 @ 21:41), 255 (06-03 @ 14:17)    INR: 1.86 06-06-22 @ 07:21, 2.47 06-04-22 @ 22:42  PTT: 31.7 06-06-22 @ 07:21, 32.7 06-04-22 @ 22:42

## 2022-06-06 NOTE — PROGRESS NOTE ADULT - SUBJECTIVE AND OBJECTIVE BOX
SUMMARY:  67 year-old man with strokes in setting of carotid stenosis whose hospital course has been complicated by melena requiring blood transfusions and rapid atrial fibrillation.     24 HOUR EVENTS:  Continued drop in H/H despite multiple transfusions. Taken to IR where no active contrast extravasation seen but empiric gastroduodenal artery coil embolization performed. Agitated at end of the case, so left intubated and paralyzed on transport back to [facility]. MAP >65mmHg during IR case, relative hypotension upon arrival to NSCU so placed on neosynephrine.     VITALS/DATA/ORDERS: [x] Reviewed    EXAMINATION:   Intubated, sedated/paralyzed  No respiratory distress  Abdomen soft  No groin hematoma, distal pulses faint but present

## 2022-06-06 NOTE — PROGRESS NOTE ADULT - SUBJECTIVE AND OBJECTIVE BOX
Vascular & Interventional Radiology    Interval history: 2 u pRBC overnight without significant change in H/H.    Allergies: nitrofurantoin (Nephrotoxicity)    Data:  T(C): 36.5  HR: 102  BP: 124/52  RR: 20  SpO2: 95%    -WBC 18.13 / HgB 7.6 / Hct 24.4 / Plt 264  -Na 137 / Cl 100 / BUN 67 / Glucose 242  -K 4.3 / CO2 24 / Cr 4.57  -INR2.47      Assessment:  67y Male w/ left ICA occlusion on Eliquis and ASA and also UGIB s/p EGD and clipping of duodenal ulcer by GI. IR consulted for downtrending H/H. Patient persistently bleeding.    Plan:   - Given persistent bleeding and need for continued anticoagulation, will plan for catheter angio/embo today.  - NPO.  - Place IR procedure order under Dr. Cat.

## 2022-06-07 NOTE — PROGRESS NOTE ADULT - NS ATTEND AMEND GEN_ALL_CORE FT
S/p Intubation  ABG reviewed  Titrate down Fio2 to 40%  keep sat>90%  Appears to have ongoing anemia with concern for bleeding

## 2022-06-07 NOTE — PROGRESS NOTE ADULT - SUBJECTIVE AND OBJECTIVE BOX
[facility]U ATTENDING -- ADDITIONAL PROGRESS NOTE    Nighttime rounds were performed -- please refer to earlier Progress Note for HPI details.    T(C): 37.2 (06-07-22 @ 15:00), Max: 37.2 (06-07-22 @ 07:00)  HR: 115 (06-07-22 @ 18:45) (59 - 137)  BP: 128/74 (06-07-22 @ 08:00) (119/54 - 128/74)  RR: 15 (06-07-22 @ 18:45) (11 - 37)  SpO2: 98% (06-07-22 @ 18:15) (81% - 100%)  Wt(kg): --    Relevant labwork and imaging reviewed.    Patient remains critically ill.    [A/P]    Additional 30 minutes of critical care time. NSCU ATTENDING -- ADDITIONAL PROGRESS NOTE    Nighttime rounds were performed -- please refer to earlier Progress Note for HPI details.    T(C): 37.2 (06-07-22 @ 15:00), Max: 37.2 (06-07-22 @ 07:00)  HR: 115 (06-07-22 @ 18:45) (59 - 137)  BP: 128/74 (06-07-22 @ 08:00) (119/54 - 128/74)  RR: 15 (06-07-22 @ 18:45) (11 - 37)  SpO2: 98% (06-07-22 @ 18:15) (81% - 100%)  Wt(kg): --    Relevant labwork and imaging reviewed.    Family meeting with jorge, decision made for DNR, CMO    morphine prn for comfort  further discussion with family re: removal of ET tube

## 2022-06-07 NOTE — PROGRESS NOTE ADULT - SUBJECTIVE AND OBJECTIVE BOX
Follow-up Pulm Progress Note    intubated yesterday for IR procedure      Medications:  MEDICATIONS  (STANDING):  ALBUTerol    90 MICROgram(s) HFA Inhaler 2 Puff(s) Inhalation every 6 hours  albuterol/ipratropium for Nebulization 3 milliLiter(s) Nebulizer every 6 hours  allopurinol 100 milliGRAM(s) Oral daily  aspirin  chewable 81 milliGRAM(s) Oral daily  atorvastatin 40 milliGRAM(s) Oral at bedtime  chlorhexidine 0.12% Liquid 15 milliLiter(s) Oral Mucosa every 12 hours  CRRT Treatment    <Continuous>  dexMEDEtomidine Infusion 0.007 MICROgram(s)/kG/Hr (0.2 mL/Hr) IV Continuous <Continuous>  diltiazem    Tablet 90 milliGRAM(s) Oral every 8 hours  erythromycin   Ointment 1 Application(s) Right EYE at bedtime  influenza  Vaccine (HIGH DOSE) 0.7 milliLiter(s) IntraMuscular once  insulin lispro (ADMELOG) corrective regimen sliding scale   SubCutaneous every 6 hours  lidocaine   4% Patch 1 Patch Transdermal daily  Nephro-reena 1 Tablet(s) Oral daily  ofloxacin 0.3% Solution 1 Drop(s) Right EYE four times a day  pantoprazole Infusion 8 mG/Hr (10 mL/Hr) IV Continuous <Continuous>  phenylephrine    Infusion 1.9 MICROgram(s)/kG/Min (43.1 mL/Hr) IV Continuous <Continuous>  Phoxillum Filtration BK 4 / 2.5 5000 milliLiter(s) (600 mL/Hr) CRRT <Continuous>  Phoxillum Filtration BK 4 / 2.5 5000 milliLiter(s) (1400 mL/Hr) CRRT <Continuous>  povidone iodine 10% Solution 1 Application(s) Topical daily  PrismaSOL Filtration BGK 4 / 2.5 5000 milliLiter(s) (800 mL/Hr) CRRT <Continuous>  sodium chloride 3%  Inhalation 4 milliLiter(s) Inhalation every 12 hours  sucralfate 1 Gram(s) Oral every 12 hours  trifluridine 1% Solution 1 Drop(s) Right EYE every 3 hours    MEDICATIONS  (PRN):  acetaminophen     Tablet .. 650 milliGRAM(s) Oral every 6 hours PRN Temp greater or equal to 38C (100.4F), Mild Pain (1 - 3)  fentaNYL    Injectable 25 MICROGram(s) IV Push every 2 hours PRN Severe Pain (7 - 10)      Mode: AC/ CMV (Assist Control/ Continuous Mandatory Ventilation)  RR (machine): 16  TV (machine): 500  FiO2: 50  PEEP: 5  ITime: 1  MAP: 10  PIP: 17      Vital Signs Last 24 Hrs  T(C): 37.2 (07 Jun 2022 07:00), Max: 37.2 (07 Jun 2022 07:00)  T(F): 99 (07 Jun 2022 07:00), Max: 99 (07 Jun 2022 07:00)  HR: 101 (07 Jun 2022 10:00) (59 - 118)  BP: 128/74 (07 Jun 2022 08:00) (119/54 - 145/57)  BP(mean): 86 (07 Jun 2022 08:00) (66 - 86)  RR: 18 (07 Jun 2022 10:00) (11 - 37)  SpO2: 100% (07 Jun 2022 10:00) (92% - 100%)    ABG - ( 06 Jun 2022 18:34 )  pH, Arterial: 7.38  pH, Blood: x     /  pCO2: 39    /  pO2: 333   / HCO3: 23    / Base Excess: -1.8  /  SaO2: 99.2                  06-06 @ 07:01  -  06-07 @ 07:00  --------------------------------------------------------  IN: 2096.6 mL / OUT: 0 mL / NET: 2096.6 mL          LABS:                        6.8    19.15 )-----------( 248      ( 07 Jun 2022 06:32 )             20.7     06-06    139  |  102  |  72<H>  ----------------------------<  232<H>  4.5   |  24  |  5.15<H>    Ca    8.6      06 Jun 2022 20:11  Phos  5.3     06-06  Mg     2.2     06-06            CAPILLARY BLOOD GLUCOSE      POCT Blood Glucose.: 186 mg/dL (07 Jun 2022 05:12)    PT/INR - ( 06 Jun 2022 21:21 )   PT: 19.7 sec;   INR: 1.70 ratio         PTT - ( 07 Jun 2022 03:21 )  PTT:86.2 sec    Procalcitonin, Serum: 0.78 ng/mL (06-04-22 @ 22:42)            CULTURES:     Culture - Blood (collected 06-04-22 @ 22:49)  Source: .Blood Blood-Peripheral  Preliminary Report (06-06-22 @ 06:01):    No growth to date.    Culture - Blood (collected 05-31-22 @ 02:33)  Source: .Blood Blood  Final Report (06-05-22 @ 09:00):    No Growth Final    Culture - Blood (collected 05-31-22 @ 02:33)  Source: .Blood Blood  Final Report (06-05-22 @ 09:00):    No Growth Final            Physical Examination:  PULM: coarse bilaterally   CVS: S1, S2 heard    RADIOLOGY REVIEWED  CXR 6/4: L effusion/atelectasis

## 2022-06-07 NOTE — PROGRESS NOTE ADULT - ASSESSMENT
Impression:  This is a 67 year old gentleman pmhx CAD s/p MI/PCI/CABG, hx TIA, afib on rivaroxaban, HTN, DM2, PVD, gout, L CEA 2014, and CKD who presented to [facility] 4/11/22 with ANT, tremors, confusion and lethargy.  CT head no acute changes.  S/p initiation of hemodialysis.  Mental status had improved dramatically.  Pt denies any headaches, no slurred speech, no hemiparesis.  He has chronic gout with bilateral finger swelling, pain.  He also has chronic neuropathy.  Both legs are weak.      Persistent encephalopathy prompted re-consultation on 5/10/22.  MRI brain was ordered and revealed small acute infarctions in bilateral posterior centrum semiovale and left corona radiata and left posterior periventricular white matter.  Carotid ultrasound was performed showing new occlusion of the left internal carotid artery, along with greater than 70% stenosis involving the distal right common carotid artery, and mild to moderate flow-limiting stenosis is seen at the left external carotid artery.  Vascular surgery has been consulted and is recommending CTA.  He continues to be encephalopathic and lethargic.      5/30 stroke code activated for altered mental status.   MRI brain shows new small, scattered infarcts, more in left hemisphere.  Unclear if cause of encephalopathy   s/p DU clipping, needed transfusion on 6/1 cammie  6/7/22: in NSICU intubated not sedated     Diagnosis and Recommendations:  1.  Multifactorial delirium - has been waxing and waning through his hospital course.  Video EEG unremarkable for seizures.  Declines in level of mentation in setting of medical illness.  GIB requiring transfusion and still with melena.      2. Complete left ICA occlusion and 70% distal CCA stenosis. Has collateral flow via acomm and pcomm but should be re-evaluated for either CEA or carotid stenting. CEA may be ideal considering issues with medication compliance and potential difficulty adhering to DAPT regimen but defer to proceduralists regarding this. On 5/25, discussed extensively with son at bedside: all options here have risk associated with them.  Opening the artery may or may not help.  However, it will reduce long-term stroke risk.  It might help with his mental status by helping brain perfusion, though this cannot be guaranteed.    - MRA was suboptimal not visualizing the neck but MRA head showed left KESHAWN coming from ACOM and left PCA feeding left MCA.  ICA showed no flow.    - in setting of atrial fibrillation infarcts could potentially also be cardioembolic, continue anticoagulation as per cardiology  - s/p carotid doppler, suboptimal CTA/MRA.  However, it is apparent that there is a proximal left ICA occlusion, which would not be a candidate for intervention.  Intervention for right CCA/ICA stenosis would be high risk but it may indeed by a symptomatic stenosis.   - ECHO in February 2022 did not reveal severe cardiomyopathy, vegetation, or LV thrombus.    - HgA1c of 11 also driving stroke risk.  Goal is < 7  - continue high-intensity statin therapy    The issue is the high grade right stenosis and high risk of recurrent stroke, appreciate notes from neurointervention on risks of cea vs stent, vasc note appreciated

## 2022-06-07 NOTE — PROGRESS NOTE ADULT - PROBLEM SELECTOR PLAN 1
intubated for embolization in IR 6/6  -Check CXR to verify ETT placement   -Keep sats >90%, pH >7.25  -H&H downtrending   -F/u CTA abdomen

## 2022-06-07 NOTE — PROGRESS NOTE ADULT - SUBJECTIVE AND OBJECTIVE BOX
SUBJECTIVE:   pt had RRT on 6/4 with decline of mental status  he has not had improvement since that time  transfused overnite 6/5  in NSICU now.  Melena overnight.  Intubated but not sedated         NEUROLOGICAL EXAM:    Mental status: opens eyes to voice, not responding, not following commands, not naming or repeating.  Attention is poor.  Seems to occasionally look towards examiner    Cranial Nerves: Pupils were equal, right pupil appears surgical, both pupils are reactive to light. Extraocular movements were intact. B BTT.  Due to mental status unable to assess facial sensation    Motor exam: Bulk and tone were normal. Power 1/5 throughout    Reflexes: DTRs depressed, flexor equivocal    Sensation: Intact to noxious    Coordination: no gross dysmetria    Gait: deferred    NIHSS: 19    Imaging:    ACC: [id number] EXAM:  CT BRAIN                        PROCEDURE DATE:  04/09/2022      INTERPRETATION:  CLINICAL INFORMATION:  Altered mental status, on   anticoagulation .    TECHNIQUE: Multiple contiguous axial images were acquired from the   skullbase to the vertex without the administration of intravenous   contrast.  Coronal and sagittal reformations were made.    COMPARISON: CT head 10/21/2021, brain MRI 02/23/2014.    FINDINGS:    Scattered lacunar infarcts in the bilateral cerebralhemispheres are   grossly stable compared to the 10/21/2021 head CT. No new additional   infarcts are noted over the time interval.    No acute intracranial hemorrhage, mass effect, or midline shift. The   brain demonstrates periventricular hypoattenuation, nonspecific in   etiology but likely representing chronic microvascular ischemic changes.    No abnormal extra-axial fluid collections are present.    The ventricles and sulci demonstrate age appropriate involutional   changes.  The basal cisterns are patent.    The orbits are unremarkable. The paranasal sinuses are clear. The mastoid   air cells are clear. The calvarium is intact.    IMPRESSION:    No acute intracranial abnormality is noted. If the patient has new and   persistent symptoms, consider short interval follow-up head CT or brain   MRI.    --- End of Report ---    GATITO VILLARREAL MD; Resident Radiologist  This document has been electronically signed.  JESSE CORONA MD; Attending Radiologist  This document has been electronically signed. Apr 9 2022  2:00PM        ACC: [id number] EXAM:  MR BRAIN                          PROCEDURE DATE:  05/11/2022          INTERPRETATION:  MR brain  without gadolinium    CLINICAL INFORMATION: Stroke.    TECHNIQUE: Limited Sagittal T1-weighted images, axial FLAIR images, and   axial diffusion weighted images of the brain were obtained.  Patient was   unable to hold still for remaining sequences.    FINDINGS:   MRI dated 02/23/2014 available for review.    The brain demonstrates small acute infarctions in the BILATERAL posterior   centrum semiovale and LEFT corona radiata and LEFT posterior   periventricular white matter with restricted diffusion. No intracranial   hemorrhage is recognized. No mass effect is found in the brain.    The ventricles, sulci and basal cisterns show mild global atrophy most   prominent within the BILATERAL cerebellum.    The vertebral and internal carotid arteries demonstrate expected flow   voids indicating their patency.    The orbits are unremarkable.  The paranasal sinuses are clear.  The nasal   cavity appears intact.  The nasopharynx is symmetric.  The central skull   base and temporal bones are intact.  The calvarium appears unremarkable.    IMPRESSION: Small acute infarctions in the BILATERAL posterior centrum   semiovale and LEFT corona radiata and LEFT posterior periventricular   white matter with restricted diffusion.   mild global atrophy most   prominent within the BILATERAL cerebellum.    --- End of Report ---            JESÚS PADGETT MD; Attending Radiologist  This document has been electronically signed. May 11 2022  5:47PM          < from: CT Angio Head w/ IV Cont (05.12.22 @ 13:34) >    ACC: [id number] EXAM:  CT ANGIO NECK (W)AW IC                        ACC: [id number] EXAM:  CT ANGIO BRAIN (W)AW IC                          PROCEDURE DATE:  05/12/2022          INTERPRETATION:  CT angiography of the brain and neck    CLINICAL INDICATION: Recent infarcts. Carotid stenosis.    TECHNIQUE: Direct axial CT scanning of the brain and neck was obtained   from the vertex to the level of the clavicular heads after the dynamic   intravenous injection of 44 cc of Omnipaque 300. 0 cc were discarded.   Sagittal and coronal maximum intensity projection reformats were   provided.  Three-dimensional reconstructions were performed by the   radiologist using the [employer] workstation.    Additionally, noncontrast axial CT scanning of the brain was obtained   from the skull base to the vertex. Sagittal and coronal reformats were   provided.    COMPARISON: MRA neck 10/24/2021    FINDINGS:    CT brain:    Previously seen acute infarcts in the bilateral cerebral hemispheres are   redemonstrated. No CT evidence for hemorrhagic transformation.    No hydrocephalus, midline shift, or effacement of basal cisterns.    No acute intracranial hemorrhage or vasogenic edema.    Mild to moderate white matter microvascular ischemic disease.    CTA brain:    Limited by relatively decreased opacification of the arteries.    Multifocal narrowing of the right skull base ICA due to calcified plaque,   the degree of which is not well evaluated.    Normal flow-related enhancement of the supraclinoid right ICA.    Lack of flow related enhancement of the petrous, precavernous, and   cavernous left ICA. Reconstitution of the vessel at its supraclinoid   segment.    Otherwise no flow-limiting stenosis.    Normal flow-related enhancement of the bilateral anterior, middle, and   posterior cerebral arteries.    Normal flow-related enhancement of the intradural vertebral arteries and   basilar artery.    No vascular aneurysm or AVM.    CTA neck:    Stenoses described in this report are on the basis of NASCET criteria.    Study is significantly limited by relatively decreased opacification of   the arteries.    Short segment stenosis of the mid left common carotid artery due to the   presence of partially calcified plaque is poorly evaluated. Flow-related   enhancement of the more proximal and distal left common carotid artery is   noted.    Long segment stenosis of the mid and distal right common carotid artery   due to the presence of partially calcified plaque is poorly evaluated.    Rapidly progressive stenosis of the left internal carotid artery   beginning at its origin. No flow related enhancement of the vessel beyond   its origin.    Normal flow-related enhancement of the bilateral vertebral arteries.    No gross evidence for acute arterial dissection.    IMPRESSION:    CTA brain:  Limited by relatively decreased opacification of the arteries.    Multifocal narrowing of the right skull base ICA due to calcified plaque,   the degree of which is not well evaluated.    Lack of flow related enhancement of the petrous, precavernous, and   cavernous left ICA. Reconstitution of the vessel at its supraclinoid   segment.    Otherwise no flow-limiting stenosis.    No vascular aneurysm or AVM.    CTA neck:  Limited by relatively decreased opacification of the arteries.    Short segment stenosis of the mid left common carotid artery due to the   presence of partially calcified plaque is poorly evaluated. Flow-related   enhancement of the more proximal and distal left common carotid artery is   noted.    Long segment stenosis of the mid and distal right common carotid artery   due to the presence of partially calcified plaque is poorly evaluated.    Rapidly progressive stenosis of the left internal carotid artery   beginning at its origin. No flow related enhancement of the vessel beyond   its origin.    Recommend correlation with contrast-enhanced MRI of the neck for further   evaluation.    --- End of Report ---            MIGUEL ANGEL CARRILLO MD; Attending Radiologist  This document has been electronically signed. May 12 2022  2:47PM    < end of copied text >      < from: MR Head No Cont (05.31.22 @ 08:42) >    ACC: [id number] EXAM:  MR BRAIN                          PROCEDURE DATE:  05/31/2022          INTERPRETATION:  Clinical history: Code stroke.    Limited MRI was performed using axial diffusion and susceptibility   weighted sequence as well as axial T2-weighted sequence. This exam is   compared with prior brain MRI performed on May 11, 2022.    Scattered areas of abnormal T2 prolongation with restricted diffusion is   seen involving the bilateral corona radiata and centrum semiovale region   as well as the left periatrial region. These findings compatible with   areas of acute infarcts. These findings could be secondary to systemic   etiology. Please correlate clinically.    Parenchymal volume loss and chronic microvascular ischemic changes are   identified    Multiple inflammatory changes seen involving the right mastoid.    The visualized paranasal sinuses mastoid and middle ear regions appear   clear.    IMPRESSION: Acute infarcts as described above.    --- End of Report ---            TOMASA BUTLER MD; Attending Radiologist  This document has been electronically signed. May 31 2022  9:06AM    < end of copied text >

## 2022-06-07 NOTE — CHART NOTE - NSCHARTNOTEFT_GEN_A_CORE
I had a long discussion with leslee's wife, daughter and brother. I explained to them that unfortunately leslee is still having GI bleed, we are transfusing bassem, he is in shock and is on pressers. We changed his HD to CVVHD. His neurologic examination is worse, possibly due to decrease perfusion to the brain from the hypotension, but cannot r/o ICA occlusion.  Daughter and wife told me that leslee would not want to live disabled. He is a very active person, and even before this admission he was unhappy that his gait was affected and that he has to walk with a cane.   Wife said that she doesn't want to see him suffer anymore. I offered comfort measures, and explained to them that entails DNR, DNI, stopping all medications, and using medications like morphine for comfort. We will stop dialysis.  Family understand and want him to be comfortable.   Will hold off on extubation for now but will use morphine PRN for discomfort and versed. If needed can add precedex.

## 2022-06-07 NOTE — PROGRESS NOTE ADULT - NS PANP COMMENT GEN_ALL_CORE FT
Recurrent UGI bleed, EGD last week, bleeding DU s/p endoclip placement x4, anticoagulation  was not stopped  now with melena yesterday, s/p 3 unit PRBCs transfusion    plan IV ppi drip          IR embolization today
Deborah Melchor MD, FACP, FACG, AGAF  [facility]  [phone]     After hours and weekend coverage GI service : [phone]

## 2022-06-07 NOTE — PROGRESS NOTE ADULT - SUBJECTIVE AND OBJECTIVE BOX
Patient is hypotensive on phenylephrine   he continues to have UGI bleed  CTA showed active bleeding  GI aware, possible EGD  however he is unstable , no on vasopressin will give 1 L Plasmalyte    hold CVVHD given that he is unstable  neuro exam worse , he is extensor posturing anisocoria at SBP of 95, at SBP of 140 he is able to track   attempted to get portable CT head but he doesn't fit in the scan   r/o carotid occlusion vs hypoperfusion, likely hypoperfusion as his exam is better with high  SBP   unstable to go for CTA/CT head now as he is on pressors and on CVVHD   Neuro IR aware of the worsening in neuro exam   I discussed with family that his condition is critical.   Will continue GO discussion tonight   daughter said that he would not want to live " like this" will talk to wife     35 critical care time at risk for cardiac arrest stroke

## 2022-06-07 NOTE — CHART NOTE - NSCHARTNOTEFT_GEN_A_CORE
present for family meeting along with NSCU attending  pt's wife, brother and daughter present for meeting    family wishes to pursue comfort measures, no further invasive GI procedures    Nicolas Coleman PA-C    [facility]  [phone]  After hours and weekend coverage [phone]

## 2022-06-07 NOTE — PROGRESS NOTE ADULT - SUBJECTIVE AND OBJECTIVE BOX
Patient is a 67y old  Male who presents with a chief complaint of Decreased urine output for the past week, leg oedema (07 Jun 2022 19:09)      Vascular Surgery Attending Progress Note    Interval HPI: pt remains intubated     Medications:  lidocaine   4% Patch 1 Patch Transdermal daily  LORazepam   Injectable 1 milliGRAM(s) IV Push every 2 hours PRN  morphine  - Injectable 2 milliGRAM(s) IV Push every 2 hours PRN  multiple electrolytes Injection Type 1 Bolus 1000 milliLiter(s) IV Bolus once      Vital Signs Last 24 Hrs  T(C): 37.2 (07 Jun 2022 15:00), Max: 37.2 (07 Jun 2022 07:00)  T(F): 99 (07 Jun 2022 15:00), Max: 99 (07 Jun 2022 07:00)  HR: 115 (07 Jun 2022 18:45) (59 - 137)  BP: 128/74 (07 Jun 2022 08:00) (119/54 - 128/74)  BP(mean): 86 (07 Jun 2022 08:00) (66 - 86)  RR: 15 (07 Jun 2022 18:45) (11 - 37)  SpO2: 98% (07 Jun 2022 18:15) (81% - 100%)  I&O's Summary    06 Jun 2022 07:01  -  07 Jun 2022 07:00  --------------------------------------------------------  IN: 2165.5 mL / OUT: 0 mL / NET: 2165.5 mL    07 Jun 2022 07:01  -  07 Jun 2022 19:26  --------------------------------------------------------  IN: 2579.3 mL / OUT: 0 mL / NET: 2579.3 mL        Physical Exam:  Vascular:  no acute changes     LABS:                        7.8    24.02 )-----------( 228      ( 07 Jun 2022 13:21 )             23.5     06-06    139  |  102  |  72<H>  ----------------------------<  232<H>  4.5   |  24  |  5.15<H>    Ca    8.6      06 Jun 2022 20:11  Phos  4.5     06-07  Mg     2.2     06-06      PT/INR - ( 06 Jun 2022 21:21 )   PT: 19.7 sec;   INR: 1.70 ratio         PTT - ( 07 Jun 2022 10:59 )  PTT:28.5 sec    UMAIR MCGRAW MD  [phone] Cell [phone]

## 2022-06-07 NOTE — PROCEDURE NOTE - NSPROCNAME_GEN_A_CORE
Interventional Radiology
Interventional Radiology
Central Line Insertion
Arterial Puncture/Cannulation
Central Line Insertion
Central Line Insertion

## 2022-06-07 NOTE — PROGRESS NOTE ADULT - SUBJECTIVE AND OBJECTIVE BOX
[facility]   [phone]  RENAL FOLLOW UP NOTE  --------------------------------------------------------------------------------  Chief Complaint:    24 hour events/subjective:    seen earlier   above events noted  intubated    PAST HISTORY  --------------------------------------------------------------------------------  No significant changes to PMH, PSH, FHx, SHx, unless otherwise noted    ALLERGIES & MEDICATIONS  --------------------------------------------------------------------------------  Allergies    nitrofurantoin (Nephrotoxicity)    Intolerances      Standing Inpatient Medications  lidocaine   4% Patch 1 Patch Transdermal daily  multiple electrolytes Injection Type 1 Bolus 1000 milliLiter(s) IV Bolus once  trifluridine 1% Solution 1 Drop(s) Right EYE <User Schedule>    PRN Inpatient Medications  LORazepam   Injectable 1 milliGRAM(s) IV Push every 2 hours PRN  morphine  - Injectable 2 milliGRAM(s) IV Push every 2 hours PRN      REVIEW OF SYSTEMS  --------------------------------------------------------------------------------    intubated     VITALS/PHYSICAL EXAM  --------------------------------------------------------------------------------  T(C): 36.4 (06-07-22 @ 19:00), Max: 37.2 (06-07-22 @ 07:00)  HR: 124 (06-07-22 @ 19:00) (59 - 137)  BP: 128/74 (06-07-22 @ 08:00) (119/54 - 128/74)  RR: 14 (06-07-22 @ 19:00) (11 - 35)  SpO2: 100% (06-07-22 @ 19:00) (81% - 100%)  Wt(kg): --  Height (cm): 180.3 (06-06-22 @ 17:25)  Weight (kg): 115.3 (06-07-22 @ 14:15)  BMI (kg/m2): 35.5 (06-07-22 @ 14:15)  BSA (m2): 2.33 (06-07-22 @ 14:15)      06-06-22 @ 07:01  -  06-07-22 @ 07:00  --------------------------------------------------------  IN: 2165.5 mL / OUT: 0 mL / NET: 2165.5 mL    06-07-22 @ 07:01  -  06-07-22 @ 21:02  --------------------------------------------------------  IN: 2579.3 mL / OUT: 0 mL / NET: 2579.3 mL      Physical Exam:  	             Gen: intubated, +B/L wrist restraints  	Pulm: Decreased breath sounds b/l bases.  	CV: No JVD. IRR,  	Abd: + hypoactive BS, soft,  softly distended, obese, +NGT               Extremity no edema              Extremity LE: + hyperpigmented bilateral LE with no edema              Vascular: R IJ HD catheter, L arm AVF       LABS/STUDIES  --------------------------------------------------------------------------------              7.8    24.02 >-----------<  228      [06-07-22 @ 13:21]              23.5     139  |  102  |  72  ----------------------------<  232      [06-06-22 @ 20:11]  4.5   |  24  |  5.15        Ca     8.6     [06-06-22 @ 20:11]      Mg     2.2     [06-06-22 @ 20:11]      Phos  4.5     [06-07-22 @ 13:21]      PT/INR: PT 19.7 , INR 1.70       [06-06-22 @ 21:21]  PTT: 28.5       [06-07-22 @ 10:59]  s    Creatinine Trend:  SCr 5.15 [06-06 @ 20:11]  SCr 4.57 [06-06 @ 01:28]  SCr 2.92 [06-04 @ 22:42]  SCr 4.50 [06-03 @ 21:41]  SCr 3.01 [06-02 @ 22:44]                  Iron 21, TIBC 245, %sat 9      [05-17-22 @ 05:54]  Ferritin 120      [05-17-22 @ 05:54]  PTH -- (Ca 8.0)      [06-04-22 @ 13:30]   66  PTH -- (Ca 8.1)      [06-02-22 @ 15:14]   75  PTH -- (Ca 8.1)      [05-28-22 @ 13:06]   96  PTH -- (Ca 8.5)      [05-24-22 @ 14:22]   84  PTH -- (Ca 8.3)      [04-15-22 @ 12:18]   150  PTH -- (Ca --)      [04-03-22 @ 23:06]   97  HbA1c 11.7      [11-07-19 @ 09:14]  TSH 3.08      [06-04-22 @ 22:54]

## 2022-06-07 NOTE — PROGRESS NOTE ADULT - SUBJECTIVE AND OBJECTIVE BOX
INTERVAL HPI/OVERNIGHT EVENTS:  prior events noted  now intubated on pressor support  AC stopped (previously on apixiban, then on Heparin gtts; stopped this AM)    6/6 PM: s/p IR angio - no active extravasation on celiac and SMA angiography; s/p empiric coil embolization of GDA    intra-op 4 units PRBCs, 1 unit Plt    reported to have rectal bleeding overnight/this AM; initially "dark blood" but then changed to red blood per rectum-  per RN report  overnight 1 unit PRBCs, and 1 unit PRBCs this AM; scheduled to receive additional unit PRBCs now    MEDICATIONS  (STANDING):  ALBUTerol    90 MICROgram(s) HFA Inhaler 2 Puff(s) Inhalation every 6 hours  albuterol/ipratropium for Nebulization 3 milliLiter(s) Nebulizer every 6 hours  allopurinol 100 milliGRAM(s) Oral daily  aspirin  chewable 81 milliGRAM(s) Oral daily  atorvastatin 40 milliGRAM(s) Oral at bedtime  chlorhexidine 0.12% Liquid 15 milliLiter(s) Oral Mucosa every 12 hours  CRRT Treatment    <Continuous>  diltiazem    Tablet 90 milliGRAM(s) Oral every 8 hours  erythromycin   Ointment 1 Application(s) Right EYE at bedtime  influenza  Vaccine (HIGH DOSE) 0.7 milliLiter(s) IntraMuscular once  insulin lispro (ADMELOG) corrective regimen sliding scale   SubCutaneous every 6 hours  insulin NPH human recombinant 8 Unit(s) SubCutaneous every 6 hours  lidocaine   4% Patch 1 Patch Transdermal daily  Nephro-reena 1 Tablet(s) Oral daily  ofloxacin 0.3% Solution 1 Drop(s) Right EYE four times a day  pantoprazole Infusion 8 mG/Hr (10 mL/Hr) IV Continuous <Continuous>  phenylephrine    Infusion 0.1 MICROgram(s)/kG/Min (4.53 mL/Hr) IV Continuous <Continuous>  Phoxillum Filtration BK 4 / 2.5 5000 milliLiter(s) (600 mL/Hr) CRRT <Continuous>  Phoxillum Filtration BK 4 / 2.5 5000 milliLiter(s) (1400 mL/Hr) CRRT <Continuous>  povidone iodine 10% Solution 1 Application(s) Topical daily  PrismaSOL Filtration BGK 4 / 2.5 5000 milliLiter(s) (800 mL/Hr) CRRT <Continuous>  sodium chloride 3%  Inhalation 4 milliLiter(s) Inhalation every 12 hours  sucralfate 1 Gram(s) Oral every 12 hours  trifluridine 1% Solution 1 Drop(s) Right EYE every 3 hours    MEDICATIONS  (PRN):  acetaminophen     Tablet .. 650 milliGRAM(s) Oral every 6 hours PRN Temp greater or equal to 38C (100.4F), Mild Pain (1 - 3)  fentaNYL    Injectable 25 MICROGram(s) IV Push every 2 hours PRN Severe Pain (7 - 10)      Allergies  nitrofurantoin (Nephrotoxicity)      Review of Systems:  unable 2/2 sedation/orally intubated    Vital Signs Last 24 Hrs  T(C): 37.2 (07 Jun 2022 07:00), Max: 37.2 (07 Jun 2022 07:00)  T(F): 99 (07 Jun 2022 07:00), Max: 99 (07 Jun 2022 07:00)  HR: 98 (07 Jun 2022 09:08) (59 - 118)  BP: 128/74 (07 Jun 2022 08:00) (119/54 - 147/56)  BP(mean): 86 (07 Jun 2022 08:00) (66 - 86)  RR: 13 (07 Jun 2022 08:15) (12 - 37)  SpO2: 100% (07 Jun 2022 09:08) (92% - 100%)    PHYSICAL EXAM:  Constitutional: orally intubated, sedated on pressor requirement +NGT  HEENT: PERRLA, EOMI  Neck:  No JVD  Respiratory: +vent sounds  Cardiovascular: S1 and S2, tachy  Gastrointestinal: BS+, soft, NT/ND, no scars  Extremities: No peripheral edema +chronic stasis changes b/l   Neurological: sedated; +Rt facial droop  Skin: No rashes +chronic stasis changes b/l LE      LABS:                        6.8    19.15 )-----------( 248      ( 07 Jun 2022 06:32 )             20.7     06-06    139  |  102  |  72<H>  ----------------------------<  232<H>  4.5   |  24  |  5.15<H>    Ca    8.6      06 Jun 2022 20:11  Phos  5.3     06-06  Mg     2.2     06-06      PT/INR - ( 06 Jun 2022 21:21 )   PT: 19.7 sec;   INR: 1.70 ratio    PTT - ( 07 Jun 2022 03:21 )  PTT:86.2 sec          RADIOLOGY & ADDITIONAL TESTS:

## 2022-06-07 NOTE — PROCEDURE NOTE - PROCEDURE DATE TIME, MLM
31-May-2022 01:34
20-Apr-2022 15:40
03-Apr-2022 14:31
06-Jun-2022 19:46
07-Jun-2022 17:15
12-Apr-2022 09:54

## 2022-06-07 NOTE — PROGRESS NOTE ADULT - SUBJECTIVE AND OBJECTIVE BOX
Interventional Radiology Follow-Up Note    This is a 67y Male s/p Empiric GDA embolization on 6/6/22 in Interventional Radiology with Dr. Manley.     S: Patient seen and examined @ bedside. Patient with persistent melena now with BRPBPR ?LGIB. Received 1U PRBC @ 0510, repeat with lower h/h. Patient received a second Unit at 0700 and pending 3rd unit.    Medication:     apixaban: (06-06)  aspirin  chewable: (06-06)  diltiazem    Tablet: (06-07)  diltiazem    Tablet: (06-05)  diltiazem    Tablet: (06-05)  diltiazem Infusion: (06-05)  diltiazem Infusion: (06-06)  diltiazem Injectable: (06-05)  heparin  Infusion.: (06-06)  phenylephrine    Infusion: (06-06)    Vitals:  T(F): 99, Max: 99 (07:00)  HR: 101  BP: 128/74  RR: 18  SpO2: 100%    Physical Exam:  General: Nontoxic, in NAD, intubated. Winces in pain.   Abdomen: soft, NTND, no peritoneal signs.  Extremities:  Right groin clean, dry and intact, soft with no evidence of hematoma, +2 femoral b/l pules. +1 palpable b/l DP pulses. +dopplerable b/l PT/DP pulses.     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 Patient with conitnued downtrending H/H in setting of persistent bleeding. Unresponsive to 2U PRBCs. Now planned for s/p angiogram, No active extrav was identified, empiric embolization of the gastroduodenal artery was preformed on 6/6 with Dr. Manley.       -Patient with persistent melena now with BRPBPR ?LGIB. Received 1U PRBC @ 0510, repeat with lower h/h. Patient received a second Unit at 0700 and pending 3rd unit. GDA was embolized yesterday. If persistent bleeding, rec GI eval for ?scope.   -monitor h/h; transfuse as needed  -trend vs/labs  -continue global management per primary team   -Greater than 50% of the encounter was spent counseling and/ or coordination of care on drain care and follow up.   -Please call IR at extension [phone] with any questions, concerns, or issues regarding above.      Carolynn Marc PA-C  Available on teams

## 2022-06-07 NOTE — PROCEDURE NOTE - NSINFORMCONSENT_GEN_A_CORE
Benefits, risks, and possible complications of procedure explained to patient/caregiver who verbalized understanding and gave written consent.
Benefits, risks, and possible complications of procedure explained to patient/caregiver who verbalized understanding and gave verbal consent.
This was an emergent procedure.
Benefits, risks, and possible complications of procedure explained to patient/caregiver who verbalized understanding and gave written consent.

## 2022-06-07 NOTE — PROGRESS NOTE ADULT - SUBJECTIVE AND OBJECTIVE BOX
HPI:  Patient remotely known to me from an admission to [place] in Feb 2017--assigned to me at this point by the ER to admit this 66 y/o M--followed by his nephrologist above--patient with a history of CAD with past multiple PCI, with most recent discharge from [place] in Feb 2022 for non ST elevation MI with LULU of an 85% prox LAD lesion with patient on ASA and now off Plavix per cardiology (only for one month), chronic atrial fibrillation maintained on Pradaxa, essential HTN, type 2 DM previously on oral medications but on insulin, diabetic neuropathy with chronic RIGHT LE venous stasis changes>left, LEFT foot ulcer seen by podiatry, past endarterectomy LEFT CEA in 2014. with patient self referring to the ER following apparently treatment by an urgent care center for an apparent cystitis with hematuria on nitrofurantoin but with patient noting decreased urine output for the past week, and difficulty with B/L coarse tremors for several weeks, with patient having difficulty negotiating his applications on his smart phone (observed by examiner).  Patient with increasing B/L LE oedema and weight gain, with patient with 2 pillow orthopnoea.    OVERNIGHT EVENTS:   No acute events overnight.    VITALS:  T(C): , Max: 37 (06-06-22 @ 11:00)  HR:  (59 - 118)  BP:  (119/54 - 154/57)  ABP:  (101/39 - 198/73)  RR:  (12 - 37)  SpO2:  (92% - 100%)  Wt(kg): --  Device: Avea, Mode: AC/ CMV (Assist Control/ Continuous Mandatory Ventilation), RR (machine): 16, TV (machine): 500, FiO2: 50, PEEP: 5, ITime: 1, MAP: 11, PIP: 22    06-05-22 @ 07:01  -  06-06-22 @ 07:00  --------------------------------------------------------  IN: 2338.3 mL / OUT: 0 mL / NET: 2338.3 mL    06-06-22 @ 07:01  -  06-07-22 @ 06:37  --------------------------------------------------------  IN: 2096.6 mL / OUT: 0 mL / NET: 2096.6 mL      LABS:  Na: 139 (06-06 @ 20:11), 137 (06-06 @ 01:28), 138 (06-04 @ 22:42)  K: 4.5 (06-06 @ 20:11), 4.3 (06-06 @ 01:28), 4.2 (06-04 @ 22:42)  Cl: 102 (06-06 @ 20:11), 100 (06-06 @ 01:28), 101 (06-04 @ 22:42)  CO2: 24 (06-06 @ 20:11), 24 (06-06 @ 01:28), 25 (06-04 @ 22:42)  BUN: 72 (06-06 @ 20:11), 67 (06-06 @ 01:28), 31 (06-04 @ 22:42), 17 (06-04 @ 17:58), 51 (06-04 @ 13:29)  Cr: 5.15 (06-06 @ 20:11), 4.57 (06-06 @ 01:28), 2.92 (06-04 @ 22:42)  Glu: 232(06-06 @ 20:11), 242(06-06 @ 01:28), 204(06-04 @ 22:42)    Hgb: 7.3 (06-07 @ 03:21), 8.8 (06-06 @ 20:11), 6.8 (06-06 @ 15:04), 7.5 (06-06 @ 07:21), 7.6 (06-06 @ 01:28), 7.0 (06-05 @ 17:46), 7.5 (06-05 @ 11:33), 7.7 (06-04 @ 22:42), 7.8 (06-04 @ 13:29)  Hct: 22.3 (06-07 @ 03:21), 27.5 (06-06 @ 20:11), 21.6 (06-06 @ 15:04), 23.9 (06-06 @ 07:21), 24.4 (06-06 @ 01:28), 22.3 (06-05 @ 17:46), 24.8 (06-05 @ 11:33), 25.7 (06-04 @ 22:42), 25.6 (06-04 @ 13:29)  WBC: 21.24 (06-07 @ 03:21), 16.12 (06-06 @ 20:11), 19.23 (06-06 @ 15:04), 17.74 (06-06 @ 07:21), 18.13 (06-06 @ 01:28), 18.65 (06-05 @ 17:46), 17.70 (06-05 @ 11:33), 17.99 (06-04 @ 22:42), 12.94 (06-04 @ 13:29)  Plt: 279 (06-07 @ 03:21), 234 (06-06 @ 20:11), 273 (06-06 @ 15:04), 275 (06-06 @ 07:21), 264 (06-06 @ 01:28), 275 (06-05 @ 17:46), 268 (06-05 @ 11:33), 276 (06-04 @ 22:42), 266 (06-04 @ 13:29)    INR: 1.70 06-06-22 @ 21:21, 1.86 06-06-22 @ 07:21, 2.47 06-04-22 @ 22:42  PTT: 86.2 06-07-22 @ 03:21, 31.2 06-06-22 @ 21:21, 31.7 06-06-22 @ 07:21, 32.7 06-04-22 @ 22:42    IMAGING:   Recent imaging studies were reviewed.    MEDICATIONS:  acetaminophen     Tablet .. 650 milliGRAM(s) Oral every 6 hours PRN  ALBUTerol    90 MICROgram(s) HFA Inhaler 2 Puff(s) Inhalation every 6 hours  albuterol/ipratropium for Nebulization 3 milliLiter(s) Nebulizer every 6 hours  allopurinol 100 milliGRAM(s) Oral daily  aspirin  chewable 81 milliGRAM(s) Oral daily  atorvastatin 40 milliGRAM(s) Oral at bedtime  chlorhexidine 0.12% Liquid 15 milliLiter(s) Oral Mucosa every 12 hours  diltiazem    Tablet 90 milliGRAM(s) Oral every 8 hours  erythromycin   Ointment 1 Application(s) Right EYE at bedtime  fentaNYL    Injectable 25 MICROGram(s) IV Push every 2 hours PRN  influenza  Vaccine (HIGH DOSE) 0.7 milliLiter(s) IntraMuscular once  insulin lispro (ADMELOG) corrective regimen sliding scale   SubCutaneous every 6 hours  insulin NPH human recombinant 8 Unit(s) SubCutaneous every 6 hours  lidocaine   4% Patch 1 Patch Transdermal daily  loratadine 10 milliGRAM(s) Oral daily  Nephro-reena 1 Tablet(s) Oral daily  ofloxacin 0.3% Solution 1 Drop(s) Right EYE four times a day  pantoprazole Infusion 8 mG/Hr IV Continuous <Continuous>  phenylephrine    Infusion 0.1 MICROgram(s)/kG/Min IV Continuous <Continuous>  povidone iodine 10% Solution 1 Application(s) Topical daily  sodium chloride 3%  Inhalation 4 milliLiter(s) Inhalation every 12 hours  sucralfate 1 Gram(s) Oral every 12 hours  trifluridine 1% Solution 1 Drop(s) Right EYE every 3 hours    EXAMINATION:  General:  calm  HEENT:  Right conjunctival discharge and congestion   Neuro:  Awake, Agitated, oriented x 0, following commands, Left side AG, right sided weakness UE 3/5, LE 2/5  Cards:  RRR  Respiratory:  no respiratory distress  Adomen:  soft  Extremities:  ischemic changes   Skin:  warm/dry   HPI:  Patient remotely known to me from an admission to [place] in Feb 2017--assigned to me at this point by the ER to admit this 68 y/o M--followed by his nephrologist above--patient with a history of CAD with past multiple PCI, with most recent discharge from [place] in Feb 2022 for non ST elevation MI with LULU of an 85% prox LAD lesion with patient on ASA and now off Plavix per cardiology (only for one month), chronic atrial fibrillation maintained on Pradaxa, essential HTN, type 2 DM previously on oral medications but on insulin, diabetic neuropathy with chronic RIGHT LE venous stasis changes>left, LEFT foot ulcer seen by podiatry, past endarterectomy LEFT CEA in 2014. with patient self referring to the ER following apparently treatment by an urgent care center for an apparent cystitis with hematuria on nitrofurantoin but with patient noting decreased urine output for the past week, and difficulty with B/L coarse tremors for several weeks, with patient having difficulty negotiating his applications on his smart phone (observed by examiner).  Patient with increasing B/L LE oedema and weight gain, with patient with 2 pillow orthopnoea.    OVERNIGHT EVENTS:   s/p 4 intra-op transfusion   1 packed RBC at night     T(C): 37.2 (06-07-22 @ 07:00), Max: 37.2 (06-07-22 @ 07:00)  HR: 98 (06-07-22 @ 09:08) (59 - 118)  BP: 128/74 (06-07-22 @ 08:00) (119/54 - 145/57)  RR: 13 (06-07-22 @ 08:15) (12 - 37)  SpO2: 100% (06-07-22 @ 09:08) (92% - 100%)  06-06-22 @ 07:01  -  06-07-22 @ 07:00  --------------------------------------------------------  IN: 2096.6 mL / OUT: 0 mL / NET: 2096.6 mL    acetaminophen     Tablet .. 650 milliGRAM(s) Oral every 6 hours PRN  ALBUTerol    90 MICROgram(s) HFA Inhaler 2 Puff(s) Inhalation every 6 hours  albuterol/ipratropium for Nebulization 3 milliLiter(s) Nebulizer every 6 hours  allopurinol 100 milliGRAM(s) Oral daily  aspirin  chewable 81 milliGRAM(s) Oral daily  atorvastatin 40 milliGRAM(s) Oral at bedtime  chlorhexidine 0.12% Liquid 15 milliLiter(s) Oral Mucosa every 12 hours  CRRT Treatment    <Continuous>  diltiazem    Tablet 90 milliGRAM(s) Oral every 8 hours  erythromycin   Ointment 1 Application(s) Right EYE at bedtime  fentaNYL    Injectable 25 MICROGram(s) IV Push every 2 hours PRN  influenza  Vaccine (HIGH DOSE) 0.7 milliLiter(s) IntraMuscular once  insulin lispro (ADMELOG) corrective regimen sliding scale   SubCutaneous every 6 hours  insulin NPH human recombinant 8 Unit(s) SubCutaneous every 6 hours  lidocaine   4% Patch 1 Patch Transdermal daily  Nephro-reena 1 Tablet(s) Oral daily  ofloxacin 0.3% Solution 1 Drop(s) Right EYE four times a day  pantoprazole Infusion 8 mG/Hr IV Continuous <Continuous>  phenylephrine    Infusion 0.1 MICROgram(s)/kG/Min IV Continuous <Continuous>  Phoxillum Filtration BK 4 / 2.5 5000 milliLiter(s) CRRT <Continuous>  Phoxillum Filtration BK 4 / 2.5 5000 milliLiter(s) CRRT <Continuous>  povidone iodine 10% Solution 1 Application(s) Topical daily  PrismaSOL Filtration BGK 4 / 2.5 5000 milliLiter(s) CRRT <Continuous>  sodium chloride 3%  Inhalation 4 milliLiter(s) Inhalation every 12 hours  sucralfate 1 Gram(s) Oral every 12 hours  trifluridine 1% Solution 1 Drop(s) Right EYE every 3 hours  Mode: AC/ CMV (Assist Control/ Continuous Mandatory Ventilation), RR (machine): 16, TV (machine): 500, FiO2: 50, PEEP: 5, ITime: 1, MAP: 10, PIP: 17      EXAMINATION:  General:  calm  HEENT:  Right conjunctival discharge and congestion   Neuro:  Awake, Agitated, oriented x 0, following commands, Left side AG, right sided weakness UE 3/5, LE 2/5  Cards:  RRR  Respiratory:  no respiratory distress  Adomen:  soft  Extremities:  ischemic changes   Skin:  warm/dry        LABS:  Na: 139 (06-06 @ 20:11), 137 (06-06 @ 01:28), 138 (06-04 @ 22:42)  K: 4.5 (06-06 @ 20:11), 4.3 (06-06 @ 01:28), 4.2 (06-04 @ 22:42)  Cl: 102 (06-06 @ 20:11), 100 (06-06 @ 01:28), 101 (06-04 @ 22:42)  CO2: 24 (06-06 @ 20:11), 24 (06-06 @ 01:28), 25 (06-04 @ 22:42)  BUN: 72 (06-06 @ 20:11), 67 (06-06 @ 01:28), 31 (06-04 @ 22:42), 17 (06-04 @ 17:58), 51 (06-04 @ 13:29)  Cr: 5.15 (06-06 @ 20:11), 4.57 (06-06 @ 01:28), 2.92 (06-04 @ 22:42)  Glu: 232(06-06 @ 20:11), 242(06-06 @ 01:28), 204(06-04 @ 22:42)    Hgb: 6.8 (06-07 @ 06:32), 7.3 (06-07 @ 03:21), 8.8 (06-06 @ 20:11), 6.8 (06-06 @ 15:04), 7.5 (06-06 @ 07:21), 7.6 (06-06 @ 01:28), 7.0 (06-05 @ 17:46), 7.5 (06-05 @ 11:33), 7.7 (06-04 @ 22:42), 7.8 (06-04 @ 13:29)  Hct: 20.7 (06-07 @ 06:32), 22.3 (06-07 @ 03:21), 27.5 (06-06 @ 20:11), 21.6 (06-06 @ 15:04), 23.9 (06-06 @ 07:21), 24.4 (06-06 @ 01:28), 22.3 (06-05 @ 17:46), 24.8 (06-05 @ 11:33), 25.7 (06-04 @ 22:42), 25.6 (06-04 @ 13:29)  WBC: 19.15 (06-07 @ 06:32), 21.24 (06-07 @ 03:21), 16.12 (06-06 @ 20:11), 19.23 (06-06 @ 15:04), 17.74 (06-06 @ 07:21), 18.13 (06-06 @ 01:28), 18.65 (06-05 @ 17:46), 17.70 (06-05 @ 11:33), 17.99 (06-04 @ 22:42), 12.94 (06-04 @ 13:29)  Plt: 248 (06-07 @ 06:32), 279 (06-07 @ 03:21), 234 (06-06 @ 20:11), 273 (06-06 @ 15:04), 275 (06-06 @ 07:21), 264 (06-06 @ 01:28), 275 (06-05 @ 17:46), 268 (06-05 @ 11:33), 276 (06-04 @ 22:42), 266 (06-04 @ 13:29)    INR: 1.70 06-06-22 @ 21:21, 1.86 06-06-22 @ 07:21, 2.47 06-04-22 @ 22:42  PTT: 86.2 06-07-22 @ 03:21, 31.2 06-06-22 @ 21:21, 31.7 06-06-22 @ 07:21, 32.7 06-04-22 @ 22:42         HPI:  Patient remotely known to me from an admission to [place] in Feb 2017--assigned to me at this point by the ER to admit this 66 y/o M--followed by his nephrologist above--patient with a history of CAD with past multiple PCI, with most recent discharge from [place] in Feb 2022 for non ST elevation MI with LULU of an 85% prox LAD lesion with patient on ASA and now off Plavix per cardiology (only for one month), chronic atrial fibrillation maintained on Pradaxa, essential HTN, type 2 DM previously on oral medications but on insulin, diabetic neuropathy with chronic RIGHT LE venous stasis changes>left, LEFT foot ulcer seen by podiatry, past endarterectomy LEFT CEA in 2014. with patient self referring to the ER following apparently treatment by an urgent care center for an apparent cystitis with hematuria on nitrofurantoin but with patient noting decreased urine output for the past week, and difficulty with B/L coarse tremors for several weeks, with patient having difficulty negotiating his applications on his smart phone (observed by examiner).  Patient with increasing B/L LE oedema and weight gain, with patient with 2 pillow orthopnoea.    OVERNIGHT EVENTS:   s/p 4 intra-op transfusion   1 packed RBC at night     T(C): 37.2 (06-07-22 @ 07:00), Max: 37.2 (06-07-22 @ 07:00)  HR: 98 (06-07-22 @ 09:08) (59 - 118)  BP: 128/74 (06-07-22 @ 08:00) (119/54 - 145/57)  RR: 13 (06-07-22 @ 08:15) (12 - 37)  SpO2: 100% (06-07-22 @ 09:08) (92% - 100%)  06-06-22 @ 07:01  -  06-07-22 @ 07:00  --------------------------------------------------------  IN: 2096.6 mL / OUT: 0 mL / NET: 2096.6 mL    acetaminophen     Tablet .. 650 milliGRAM(s) Oral every 6 hours PRN  ALBUTerol    90 MICROgram(s) HFA Inhaler 2 Puff(s) Inhalation every 6 hours  albuterol/ipratropium for Nebulization 3 milliLiter(s) Nebulizer every 6 hours  allopurinol 100 milliGRAM(s) Oral daily  aspirin  chewable 81 milliGRAM(s) Oral daily  atorvastatin 40 milliGRAM(s) Oral at bedtime  chlorhexidine 0.12% Liquid 15 milliLiter(s) Oral Mucosa every 12 hours  CRRT Treatment    <Continuous>  diltiazem    Tablet 90 milliGRAM(s) Oral every 8 hours  erythromycin   Ointment 1 Application(s) Right EYE at bedtime  fentaNYL    Injectable 25 MICROGram(s) IV Push every 2 hours PRN  influenza  Vaccine (HIGH DOSE) 0.7 milliLiter(s) IntraMuscular once  insulin lispro (ADMELOG) corrective regimen sliding scale   SubCutaneous every 6 hours  insulin NPH human recombinant 8 Unit(s) SubCutaneous every 6 hours  lidocaine   4% Patch 1 Patch Transdermal daily  Nephro-reena 1 Tablet(s) Oral daily  ofloxacin 0.3% Solution 1 Drop(s) Right EYE four times a day  pantoprazole Infusion 8 mG/Hr IV Continuous <Continuous>  phenylephrine    Infusion 0.1 MICROgram(s)/kG/Min IV Continuous <Continuous>  Phoxillum Filtration BK 4 / 2.5 5000 milliLiter(s) CRRT <Continuous>  Phoxillum Filtration BK 4 / 2.5 5000 milliLiter(s) CRRT <Continuous>  povidone iodine 10% Solution 1 Application(s) Topical daily  PrismaSOL Filtration BGK 4 / 2.5 5000 milliLiter(s) CRRT <Continuous>  sodium chloride 3%  Inhalation 4 milliLiter(s) Inhalation every 12 hours  sucralfate 1 Gram(s) Oral every 12 hours  trifluridine 1% Solution 1 Drop(s) Right EYE every 3 hours  Mode: AC/ CMV (Assist Control/ Continuous Mandatory Ventilation), RR (machine): 16, TV (machine): 500, FiO2: 50, PEEP: 5, ITime: 1, MAP: 10, PIP: 17      EXAMINATION:  General:  calm, intubated  HEENT:  Right conjunctival discharge and congestion   Neuro:  opens eyes to verbal stimuli, Agitated,  following commands, Left side AG, right sided weakness UE 2/5, LE 2/5  Cards:  RRR  Respiratory:  no respiratory distress  Adomen:  soft  Extremities:  ischemic changes   Skin:  warm/dry        LABS:  Na: 139 (06-06 @ 20:11), 137 (06-06 @ 01:28), 138 (06-04 @ 22:42)  K: 4.5 (06-06 @ 20:11), 4.3 (06-06 @ 01:28), 4.2 (06-04 @ 22:42)  Cl: 102 (06-06 @ 20:11), 100 (06-06 @ 01:28), 101 (06-04 @ 22:42)  CO2: 24 (06-06 @ 20:11), 24 (06-06 @ 01:28), 25 (06-04 @ 22:42)  BUN: 72 (06-06 @ 20:11), 67 (06-06 @ 01:28), 31 (06-04 @ 22:42), 17 (06-04 @ 17:58), 51 (06-04 @ 13:29)  Cr: 5.15 (06-06 @ 20:11), 4.57 (06-06 @ 01:28), 2.92 (06-04 @ 22:42)  Glu: 232(06-06 @ 20:11), 242(06-06 @ 01:28), 204(06-04 @ 22:42)    Hgb: 6.8 (06-07 @ 06:32), 7.3 (06-07 @ 03:21), 8.8 (06-06 @ 20:11), 6.8 (06-06 @ 15:04), 7.5 (06-06 @ 07:21), 7.6 (06-06 @ 01:28), 7.0 (06-05 @ 17:46), 7.5 (06-05 @ 11:33), 7.7 (06-04 @ 22:42), 7.8 (06-04 @ 13:29)  Hct: 20.7 (06-07 @ 06:32), 22.3 (06-07 @ 03:21), 27.5 (06-06 @ 20:11), 21.6 (06-06 @ 15:04), 23.9 (06-06 @ 07:21), 24.4 (06-06 @ 01:28), 22.3 (06-05 @ 17:46), 24.8 (06-05 @ 11:33), 25.7 (06-04 @ 22:42), 25.6 (06-04 @ 13:29)  WBC: 19.15 (06-07 @ 06:32), 21.24 (06-07 @ 03:21), 16.12 (06-06 @ 20:11), 19.23 (06-06 @ 15:04), 17.74 (06-06 @ 07:21), 18.13 (06-06 @ 01:28), 18.65 (06-05 @ 17:46), 17.70 (06-05 @ 11:33), 17.99 (06-04 @ 22:42), 12.94 (06-04 @ 13:29)  Plt: 248 (06-07 @ 06:32), 279 (06-07 @ 03:21), 234 (06-06 @ 20:11), 273 (06-06 @ 15:04), 275 (06-06 @ 07:21), 264 (06-06 @ 01:28), 275 (06-05 @ 17:46), 268 (06-05 @ 11:33), 276 (06-04 @ 22:42), 266 (06-04 @ 13:29)    INR: 1.70 06-06-22 @ 21:21, 1.86 06-06-22 @ 07:21, 2.47 06-04-22 @ 22:42  PTT: 86.2 06-07-22 @ 03:21, 31.2 06-06-22 @ 21:21, 31.7 06-06-22 @ 07:21, 32.7 06-04-22 @ 22:42

## 2022-06-07 NOTE — PROGRESS NOTE ADULT - ASSESSMENT
67M PMH of CAD, NSTEMI s/p 3 stents in 2014 (stents to LAD, proximal and distal LCx), ischemic cardiomyopathy, HTN for 10 years, T2DM for 26 years c/b peripheral neuropathy, CVA, TIA, Afib on Pradaxa, chronic RLE wound, L carotid stenosis s/p endarterectomy (2014) just recently admitted  to the [facility] on 2/19/2022 with acute onset chest pain for one day found to have an NSTEMI, CAD, s/p PCI in setting of increased AF rates off bb for 3 days and resolved chest pain, his CKMB peaked and Echo noted with EF 60%,normal LV function, mild TR, mild FL. He was s/p LHC with 85% proximal LAD s/p balloon angioplasty and LULU. He presented to [facility] ED with decreased urine output over the week. Carotid duplex showed Calcified atherosclerotic disease seen throughout the carotid systems. There is new occlusion of the left internal carotid artery. There is greater than 70% stenosis involving the distal right common carotid artery as was seen previously. Mild to moderate flow-limiting stenosis is seen at the left external carotid artery. Vascular surgery consulted for carotid stenosis. Pt currently denies vision changes, dizziness, lightheadedness or any other symptoms at this point in time.     PLAN:  s/p   embolization by IR   will hold off on  rt cea  for  7-10 days to allow  for a dec risk of gi  bleeding  w anticoag w vasc surg intervention  will follow

## 2022-06-07 NOTE — PROGRESS NOTE ADULT - SUBJECTIVE AND OBJECTIVE BOX
CARDIOLOGY FOLLOW UP - Dr. Mazariegos  Date of Service: 6/7/22  CC: pt on vent/vasopressor support, continues to show signs of bleeding despite GDA embolization yest    Review of Systems:  Constitutional: No fever, weight loss, or fatigue  Respiratory: No cough, wheezing, or hemoptysis, no shortness of breath  Cardiovascular: No chest pain, palpitations, passing out, dizziness, or leg swelling  Gastrointestinal: No abd or epigastric pain. No nausea, vomiting, or hematemesis; no diarrhea or consiptaiton, no melena or hematochezia  Vascular: No edema     TELEMETRY:    PHYSICAL EXAM:  T(C): 37.2 (06-07-22 @ 07:00), Max: 37.2 (06-07-22 @ 07:00)  HR: 82 (06-07-22 @ 11:52) (59 - 118)  BP: 128/74 (06-07-22 @ 08:00) (119/54 - 145/57)  RR: 18 (06-07-22 @ 10:00) (11 - 37)  SpO2: 94% (06-07-22 @ 11:52) (92% - 100%)  Wt(kg): --  I&O's Summary    06 Jun 2022 07:01  -  07 Jun 2022 07:00  --------------------------------------------------------  IN: 2096.6 mL / OUT: 0 mL / NET: 2096.6 mL        Appearance: Normal	  Cardiovascular: Normal S1 S2,RRR, No JVD, No murmurs  Respiratory: Lungs clear to auscultation	  Gastrointestinal:  Soft, Non-tender, + BS	  Extremities: Normal range of motion, No clubbing, cyanosis or edema  Vascular: Peripheral pulses palpable 2+ bilaterally       Home Medications:  allopurinol 100 mg oral tablet: 1 tab(s) orally once a day (03 Apr 2022 06:34)  aspirin 81 mg oral delayed release tablet: 1 tab(s) orally once a day (03 Apr 2022 06:34)  bumetanide 2 mg oral tablet: 1 tab(s) orally once a day (03 Apr 2022 06:34)  insulin glargine 100 units/mL subcutaneous solution: 25 unit(s) subcutaneous once a day (at bedtime) (03 Apr 2022 06:34)  metOLazone 5 mg oral tablet: 1 tab(s) orally 3 times a week (03 Apr 2022 06:34)  metoprolol succinate 50 mg oral tablet, extended release: 2 tab(s) orally once a day (03 Apr 2022 06:34)  NovoLOG 100 units/mL subcutaneous solution: subcutaneous 4 times a day (before meals and at bedtime) (03 Apr 2022 06:34)  NovoLOG FlexPen 100 units/mL injectable solution: 10 unit(s) subcutaneous 3 times a day (03 Apr 2022 06:34)  oxycodone-acetaminophen 5 mg-325 mg oral tablet: 1 tab(s) orally 2 times a day (03 Apr 2022 06:34)  Pradaxa 150 mg oral capsule: 1 cap(s) orally 2 times a day (03 Apr 2022 06:34)  pregabalin 100 mg oral capsule: 1 cap(s) orally 3 times a day (03 Apr 2022 06:34)        MEDICATIONS  (STANDING):  ALBUTerol    90 MICROgram(s) HFA Inhaler 2 Puff(s) Inhalation every 6 hours  albuterol/ipratropium for Nebulization 3 milliLiter(s) Nebulizer every 6 hours  allopurinol 100 milliGRAM(s) Oral daily  aspirin  chewable 81 milliGRAM(s) Oral daily  atorvastatin 40 milliGRAM(s) Oral at bedtime  chlorhexidine 0.12% Liquid 15 milliLiter(s) Oral Mucosa every 12 hours  CRRT Treatment    <Continuous>  dexMEDEtomidine Infusion 0.2 MICROgram(s)/kG/Hr (6.05 mL/Hr) IV Continuous <Continuous>  diltiazem    Tablet 90 milliGRAM(s) Oral every 8 hours  erythromycin   Ointment 1 Application(s) Right EYE at bedtime  influenza  Vaccine (HIGH DOSE) 0.7 milliLiter(s) IntraMuscular once  insulin lispro (ADMELOG) corrective regimen sliding scale   SubCutaneous every 6 hours  insulin NPH human recombinant 4 Unit(s) SubCutaneous every 6 hours  lidocaine   4% Patch 1 Patch Transdermal daily  Nephro-reena 1 Tablet(s) Oral daily  ofloxacin 0.3% Solution 1 Drop(s) Right EYE four times a day  pantoprazole Infusion 8 mG/Hr (10 mL/Hr) IV Continuous <Continuous>  phenylephrine    Infusion 1.9 MICROgram(s)/kG/Min (43.1 mL/Hr) IV Continuous <Continuous>  Phoxillum Filtration BK 4 / 2.5 5000 milliLiter(s) (600 mL/Hr) CRRT <Continuous>  Phoxillum Filtration BK 4 / 2.5 5000 milliLiter(s) (1400 mL/Hr) CRRT <Continuous>  povidone iodine 10% Solution 1 Application(s) Topical daily  PrismaSOL Filtration BGK 4 / 2.5 5000 milliLiter(s) (800 mL/Hr) CRRT <Continuous>  sodium chloride 3%  Inhalation 4 milliLiter(s) Inhalation every 12 hours  sucralfate 1 Gram(s) Oral every 12 hours  trifluridine 1% Solution 1 Drop(s) Right EYE every 3 hours        EKG:  RADIOLOGY:  DIAGNOSTIC TESTING:  [ ] Echocardiogram:  [ ] Catherterization:  [ ] Stress Test:  OTHER:     LABS:	 	                          7.3    21.85 )-----------( 237      ( 07 Jun 2022 10:23 )             22.5     06-06    139  |  102  |  72<H>  ----------------------------<  232<H>  4.5   |  24  |  5.15<H>    Ca    8.6      06 Jun 2022 20:11  Phos  5.3     06-06  Mg     2.2     06-06        PT/INR - ( 06 Jun 2022 21:21 )   PT: 19.7 sec;   INR: 1.70 ratio         PTT - ( 07 Jun 2022 10:59 )  PTT:28.5 sec    CARDIAC MARKERS:

## 2022-06-07 NOTE — PROGRESS NOTE ADULT - PROBLEM SELECTOR PLAN 5
-S/p EGD 6/1 found to have gastritis, oozing duodenal ulcer s/p clips       -PPI  -S/p embolization in IR.   -H&H still downtending, f/u CTA abdomen   -GI f/u

## 2022-06-07 NOTE — PROGRESS NOTE ADULT - ASSESSMENT
67 year old gentleman with PMH of CAD, NSTEMI s/p 3 stents in 2014 (stents to LAD, proximal and distal LCx), ischemic cardiomyopathy, HTN for 10 years, T2DM for 26 years c/b peripheral neuropathy, CVA, TIA, Afib on Pradaxa, chronic RLE wound, L carotid stenosis s/p endarterectomy (2014) just recently admitted  to the [facility] on 2/19/2022 with acute onset chest pain for one day found to have an NSTEMI, CAD, s/p PCI in setting of increased AF rates off bb for 3 days and resolved chest pain, his CKMB peaked and Echo noted with EF 60%,normal LV function, mild TR, mild AL. He was s/p LHC with 85% proximal LAD s/p balloon angioplasty and LULU. He presented to [facility] ED with decreased urine output over the week. Mr. Stevens went to [facility] for hematuria and was started  on Nitrofurantoin. Pt is still taking Nitrofurantoin w/ 5 days left. He came to the ED due to worsening symptoms. Pt reports having a similar incident 4-5 years ago that resolved spontaneously. He reports increased leg edema Dyspnea worse on exertion. his baseline Serum creatinine is in the 2.0 to 2.3 mg/dl range. ANT with serum creatinine of 8.29 with bun 144 and k 5.5      1- ESRD   2- chf chronic   3- hyperphosphatemia /shpt   4- anemia   5- hyperlipidemia   6- HTN /a fib  7- TIA hx   8- hx of carotid stenosis  9- GIB s/p IR intervention       given hypotension and instability hemodynamic and blood product requirements   to start CRRT with bfr 150 dfr 1400 no fluid removal HF 1400 dialyzer  see hd orders  d/w NSCU team

## 2022-06-07 NOTE — PROVIDER CONTACT NOTE (CHANGE IN STATUS NOTIFICATION) - BACKGROUND
Dx: CKD
L MCA Syndrome, on eliquis and plavix. Admit to NSCU for SBP goals >120-180 and for HD
Patient admitted on 4/3 for chronic kidney disease.
Stroke

## 2022-06-07 NOTE — PROVIDER CONTACT NOTE (CHANGE IN STATUS NOTIFICATION) - SITUATION
Patients R pupil is irregular and fixed and is now extending his LUE.
AMS
Patient fecal occult positive, had large loose BM, dark and tarry stool.
Patient with AMS, more lethargic than baseline, unresponsive to auditory and tactile stimulation.

## 2022-06-07 NOTE — PROGRESS NOTE ADULT - ASSESSMENT
Echo 5/13/22:  1. Normal left ventricular internal dimensions and wall  thicknesses.  2. Endocardial visualization enhanced with intravenous  injection of Ultrasonic Enhancing Agent (Lumason). Grossly  borderline normal left ventricular systolic function; no  obvious segmental wall motion abnormality.  3. The right ventricle is not well visualized.  *** Compared with echocardiogram of 2/22/2022, no  significant changes noted.      A/P    66 y/o M with history of CAD, s/p multiple PCI, with most recent 2/22 PCI of LAD in setting of NSTEMI, on ASA only (pradaxa), chronic atrial fibrillation maintained on Pradaxa, essential HTN, type 2 DM previously on oral medications but on insulin, diabetic neuropathy with chronic RIGHT LE venous stasis changes>left, LEFT foot ulcer seen by podiatry, past endarterectomy LEFT CEA in 2014 presenting with 1 week of decreased urine output, cystitis with hematuria     #ANT on CKD, new HD  -oliguric renal failure in setting of UTI/cystitis  -New HD for uremia, renal failure  -s/p L AVG 4/18, s/p permacath placement 4/20  -renal f/ u    #AMS/Lethargy  -ams likely from pain meds, embolic cva  -repeat CT 4/27 unremarkable  -MRI 5/11 revealed small acute infarctions in bilateral posterior centrum semiovale and left corona radiata and left posterior periventricular white matter  -repeat echo with no interval changes  -sp RRT 6/43- possible aspiration event?  -IV abx  -hold feeds  -neuro following       #Acute on chronic HFpEF  --continue volume removal with HD  --repeat echo with stable borderline lv fxn    #Chronic AF  -rates mildly elevated due to critical illness  -ccb/BB per  per NSICU   -AC held due to recurrent bleed    #HTN  -now in shock  -cont vasopressor as tolerated    #CAD, s/p PCI, most recent 2/2022  -c/w asa  -plavix on hold per neurosx   -statin     #carotid stenosis   -previous MRA  noted with Greater than 75% stenosis of the right distal common carotid artery. Approximately 60% stenosis of the proximal left internal carotid artery.  -carotid dopplers 5/11 noted :  There is new occlusion of the left internal carotid artery;  greater than 70% stenosis involving the distal right common carotid artery as was seen previously. Mild to moderate flow-limiting stenosis is seen at the left external   carotid artery.  -CTa head and neck 5/12 noted  -in light of gi bleed on triple therapy, carotid stenting deferred for now due to concern ability to treat post with DAP   -await possible CEA- patient remains stable from cardiac standpoint and can proceed for CEA with acceptable cardiac risk   -biggest concern for CEA would be neuro risk of atiya/post op CVA/IVH in light of overall status, recent cva on imaging, carotid disease burden, unilateral occlusion of ica and likely diffuse cerebrovascular disease   -holding plavix, cont ASA. eliquis on hold as well  -family to decide on any potential cea    #anemia  -sp egd 6/1 Grade C esophagitis, non bleeding erosive gastropathy, duodenitis, Non obstructing bleeding duodenal ulcer (2nd portion, 10mm in largest diameter) with active bleeding - endoclip x4 placed with control of bleeding, additional 6mm non bleeding clean based duodenal ulcer (2nd portion)  -on ppi gtt, sp PRBC , trend cbc   -GI fu   -continues to actively bleed  -s/p IR embolization  -may need repeat endo per GI    35 minutes spent on total encounter; more than 50% of the visit was spent counseling and/or coordinating care by the attending physician.

## 2022-06-07 NOTE — PROGRESS NOTE ADULT - NS PANP OPT1 GEN_ALL_CORE
I attest my time as PA/NP is greater than 50% of the total combined time spent on qualifying patient care activities by the PA/NP and attending.
I attest my time as PA/NP is greater than 50% of the total combined time spent on qualifying patient care activities by the PA/NP and attending.

## 2022-06-07 NOTE — PROVIDER CONTACT NOTE (CHANGE IN STATUS NOTIFICATION) - ACTION/TREATMENT ORDERED:
See RRT note.
Patient to be started on vasopressin to increase patients circulation.
ACP notified. RRT called.
Protonix ordered. Eliquis and Plavix to be continued as per MD Gilliland and SUSAN Thomas.

## 2022-06-07 NOTE — PROGRESS NOTE ADULT - ASSESSMENT
INCOMPLETE  Assessment and Recommendations:  67y male with a past medical history/ocular history of CAD, ESRD on HD, T2DM, HTN, cardiomyopathy, CVA, Afib, carotid stenosis consulted for right eye pain, found to have epi defect representing dendritic suggestive of HSV  keratitis much improved after trifluridine drops.      1) HSV keratitis, right eye  - patient presented with classic dendritic ulcer pattern suggestive of HSV keratitis, essentially resolved on exam today  - no vesicular or ulcerative lesions noted around the eye on skin  - no necrosis noted on initial DFE, with red reflex present in all four quadrants of the right eye  - c/w Zirgan (ganciclovir) 0.15% ophthalmic gel 5 times a day or trifluridine 1% drops nine times per day if gel is unavailable   - c/w Ocuflox (ofloxacin) QID  - c/w erythromycin ointment qhs right eye  - c/w Valtrex 500mg PO TID  - c/w cool soaks to skin lesions should they develop TID or PRN  - ophthalmology will follow    Patient was seen and discussed with attending Dr. Silva     Outpatient follow-up: Patient should follow-up with his/her ophthalmologist or with [facility] Department of Ophthalmology within 1 week of after discharge at:    [address]  [place] [address]  [phone]    Higinio Victor MD, PGY-3  Also available on [employer] Teams   Assessment and Recommendations:  67y male with a past medical history/ocular history of CAD, ESRD on HD, T2DM, HTN, cardiomyopathy, CVA, Afib, carotid stenosis consulted for right eye pain, found to initially have epi defect representing dendritic suggestive of HSV keratitis that is now much improved after trifluridine drops.      1) HSV keratitis, right eye  - patient presented with classic dendritic ulcer pattern suggestive of HSV keratitis, with epi defects essentially resolved on exam today  - no vesicular or ulcerative lesions noted around the eye on skin  - no necrosis noted on initial DFE, with red reflex present in all four quadrants of the right eye  - decrease trifluridine 1% drops to BID for 2 days, then DC   - c/w Ocuflox (ofloxacin) QID  - c/w erythromycin ointment qhs right eye  - c/w Valtrex 500mg PO TID  - c/w cool soaks to skin lesions should they develop TID or PRN  - ophthalmology will follow    Patient was seen and discussed with attending Dr. Silva     Outpatient follow-up: Patient should follow-up with his/her ophthalmologist or with [facility] Department of Ophthalmology within 1 week of after discharge at:    [address]  [place] [address]  [phone]    Higinio Victor MD, PGY-3  Also available on [employer] Teams   Assessment and Recommendations:  67y male with a past medical history/ocular history of CAD, ESRD on HD, T2DM, HTN, cardiomyopathy, CVA, Afib, carotid stenosis consulted for right eye pain, found to initially have epi defect representing dendritic suggestive of HSV keratitis that is now much improved after trifluridine drops.      1) HSV keratitis, right eye  - patient presented with classic dendritic ulcer pattern suggestive of HSV keratitis, with epi defects essentially resolved on exam today  - no vesicular or ulcerative lesions noted around the eye on skin  - no necrosis noted on initial DFE, with red reflex present in all four quadrants of the right eye  - decrease trifluridine 1% drops to BID for 2 days, then DC   - c/w Ocuflox (ofloxacin) QID  - c/w erythromycin ointment qhs right eye  - c/w Valtrex 500mg PO TID  - c/w cool soaks to skin lesions should they develop TID or PRN  - ophthalmology will follow  - findings and plan discussed with primary team    Patient was seen and discussed with attending Dr. Silva     Outpatient follow-up: Patient should follow-up with his/her ophthalmologist or with [facility] Department of Ophthalmology within 1 week of after discharge, sooner if symptoms worsen or change at:    [address]  [place] [address]  [phone]    Higinio Victor MD, PGY-3  Also available on [employer] Teams

## 2022-06-07 NOTE — PROGRESS NOTE ADULT - ATTENDING COMMENTS
I have interviewed and examined the patient and reviewed the residents note including the history, exam, assessment, and plan.  I agree with the residents assessment and plan.    67y male with a past medical history/ocular history of CAD, ESRD on HD, T2DM, HTN, cardiomyopathy, CVA, Afib, carotid stenosis consulted for right eye pain, found to initially have epi defect representing dendritic suggestive of HSV keratitis that is now much improved after trifluridine drops.    1) HSV keratitis, right eye  - patient presented with classic dendritic ulcer pattern suggestive of HSV keratitis, with epi defects essentially resolved on exam today  - no vesicular or ulcerative lesions noted around the eye on skin  - no necrosis noted on initial DFE, with red reflex present in all four quadrants of the right eye  - decrease trifluridine 1% drops to BID for 2 days, then DC   - c/w Ocuflox (ofloxacin) QID  - c/w erythromycin ointment qhs right eye  - c/w Valtrex 500mg PO TID  - c/w cool soaks to skin lesions should they develop TID or PRN  - ophthalmology will follow  - findings and plan discussed with primary team    Jocelyn Silva MD

## 2022-06-07 NOTE — PROVIDER CONTACT NOTE (CHANGE IN STATUS NOTIFICATION) - ASSESSMENT
Fecal occult test positive, large dark tarry stool
Patient with AMS, blood pressure lower than baseline 100/55 HR 75.
See neuro status assessment and flowsheet
AMS

## 2022-06-07 NOTE — PROGRESS NOTE ADULT - ASSESSMENT
ASSESSMENT/PLAN:  acute ischemic stroke, has occlusion of left ICA and right ICA stenosis   now with upper GI bleed and a.fib     NEURO:  Neuro Checks Q 4hr  ASA 81 mg OD  Eliquis to 5 mg OD  possible CEA     PULM:   RA  aspiration precautions   frequent suctioned     CV:   Afib, CAD  SBP goal 120-180 mmhg   Atorvastatin 40 mg OD  a.fib with RVR, on cardizem 10 mg/hr, start cardizem 90 mg q 8 hr   Eliquis 5 mg BID     RENAL:  Fluids: IVL   ESRD on HD - Tues thurs sat     GI:  NGT, tube feeding  NPO for angio   upper GI bleed on pantoprazol drip   Gastritis, PUD post endoscopic clipping 06/01/2022  melena overnight     ENDO:   Goal euglycemia (-180)  NPH 6 units q 6 hr while NPO   ISS    HEME/ONC:  VTE prophylaxis: [x] SCDs   Eliquis 5 mg BID, asa  cbc q 8 hr     ID:   afebrile   HSV Keratitis - opthalmology on consult      CODE STATUS:  [x] Full Code [] DNR [] DNI [] Palliative/Comfort Care    DISPOSITION:  [x] ICU [] Stroke Unit [] Floor [] EMU [] RCU [] PCU    [x] Patient is at high risk of neurologic deterioration/death due to: sepsis due to unknown organism, hypotension, cerebral hypoperfusion, acute stroke, altered mental status, respiratory failure requiring intubation     Contact: [phone]   ASSESSMENT/PLAN:  acute ischemic stroke, has occlusion of left ICA and right ICA stenosis   now with hemorrhagic shock     NEURO:  Neuro Checks Q 2 hr  continue ASA 81 mg OD for now, heparin was discontinued , i discussed with [id number] risk of stroke, however he is in hemorrhagic shock   Eliquis to 5 mg OD  precedex while intubated     PULM:   acute respiratory failure, intubated, ET in good position  vent settings PEE5, FiO2 50, RR 16,    Chest xray STAT   send ABG    CV:   Afib, CAD cardizem 90mg q 8 hr   -180 mmhg for brain perfusion   hypotensive, hemorrhagic shock, on phenylephrine 1.9   Atorvastatin 40 mg OD    RENAL:  Fluids: IVL  getting 86 ml/hr with phenylephrine ill double concentrate   ESRD change HD to CVVH   CLEARED FOR CT PER RENAL     GI:  NG   NPO   upper GI bleed on pantoprazole drip s/p celiac artery angiography performed with no active extravasation.  empiric coil embolization of the GDA was performed. SMA angiography performed, with no active extravasation.  Gastritis, PUD post endoscopic clipping 06/01/2022  melena overnight and FBR   CTA abd per GI  possible colonoscopy/endoscopy     ENDO:   Goal euglycemia (-180)  NPH 8 units q 6 hr  NPO increase to 10 units q 6 hr   ISS    HEME/ONC:  VTE prophylaxis: [x] SCDs   transfuse packed RBC now, cbc q 6 hr , coags   cbc q 8 hr     ID:   afebrile   HSV Keratitis - opthalmology on consult      CODE STATUS:  [x] Full Code [] DNR [] DNI [] Palliative/Comfort Care    DISPOSITION:  [x] ICU [] Stroke Unit [] Floor [] EMU [] RCU [] PCU    [x] Patient is at high risk of neurologic deterioration/death due to: sepsis due to unknown organism, hypotension, cerebral hypoperfusion, acute stroke, altered mental status, respiratory failure requiring intubation     Contact: [phone]   ASSESSMENT/PLAN:  acute ischemic stroke, has occlusion of left ICA and right ICA stenosis   now with hemorrhagic shock     NEURO:  Neuro Checks Q 2 hr  continue ASA 81 mg OD for now, heparin was discontinued , i discussed with family the risk of stroke, however we need to weigh the risks and benefits he is now in hemorrhagic shock   precedex while intubated     PULM:   acute respiratory failure, intubated, ET in good position  vent settings PEE5, FiO2 50, RR 16,    Chest xray STAT   send ABG    CV:   Afib, CAD cardizem 90mg q 8 hr   -180 mmhg for brain perfusion   hypotensive, hemorrhagic shock, on phenylephrine 1.9   Atorvastatin 40 mg OD    RENAL:  Fluids: IVL  getting 86 ml/hr with phenylephrine ill double concentrate   ESRD change HD to CVVH   CLEARED FOR CT PER RENAL     GI:  NG   NPO   upper GI bleed on pantoprazole drip s/p celiac artery angiography performed with no active extravasation.  empiric coil embolization of the GDA was performed. SMA angiography performed, with no active extravasation.  Gastritis, PUD post endoscopic clipping 06/01/2022  melena overnight and FBR   CTA abd per GI  possible colonoscopy/endoscopy     ENDO:   Goal euglycemia (-180)  NPH 8 units q 6 hr  NPO increase to 10 units q 6 hr   ISS    HEME/ONC:  VTE prophylaxis: [x] SCDs   transfuse packed RBC now, cbc q 6 hr , coags   cbc q 8 hr     ID:   afebrile   HSV Keratitis - opthalmology on consult      CODE STATUS:  [x] Full Code [] DNR [] DNI [] Palliative/Comfort Care    DISPOSITION:  [x] ICU [] Stroke Unit [] Floor [] EMU [] RCU [] PCU    [x] Patient is at high risk of neurologic deterioration/death due to: sepsis due to unknown organism, hypotension, cerebral hypoperfusion, acute stroke, altered mental status, respiratory failure requiring intubation     Contact: [phone]

## 2022-06-07 NOTE — PROGRESS NOTE ADULT - ASSESSMENT
67M w/ prolonged hospital course on medicine service iso renal failure req dialysis c/b b/l hypoperfusions strokes from R ICA stenosis 70-80% and L ICA occlusion (prior CEA) planned for BOOKER stent but course c/b acute uremia , GIB (melena) , and additional hypoperfusion episode and xfer to nscu.    episodes of melena 5/30-5/31  off PLavix since 5/31, IV PPI BID started 5/31; remains on ASA and Apixaban (AF) in setting of new strokes and L ICA occlusion    #acute GI blood loss anemia   s/p EGD 6/1/22: Grade C esophagitis, non bleeding erosive gastropathy, duodenitis, Non obstructing bleeding duodenal ulcer (2nd portion, 10mm in largest diameter) with active bleeding - endoclip x4 placed with control of bleeding, additional 6mm non bleeding clean based duodenal ulcer (2nd portion)  High risk for recurrent GI bleeding on triple therapy (DAPT + AC)  re-bleeding 6/5/22 with suboptimal Hgb response to transfusions - IR consulted  s/p IR angio celiac and SMA without contrast extravasation; s/p empiric GDA embolization  recurrent/persistent GI bleeding; now with red blood per rectum  ?LGI source (known diverticulosis based on CT earlier this admission); low suspicion for recurrent UGI bleed s/p IR GDA embolization    -now off AC 2/2 bleeding; remains on ASA 81.g  -transfuse PRN for goal Hgb >=8  -continue PPI gtts  -continue carafate (grade C esophagitis)  -check CTA to localize source of bleeding; discussed with Renal attending; OK to proceed. PT to start on CRRT    Discussed with NSCU team and attending  Prognosis guarded; further plan pending CTA findings    Nicolas Coleman PA-C    [facility]  [phone]  After hours and weekend coverage [phone]

## 2022-06-07 NOTE — PROCEDURE NOTE - NSPOSTCAREGUIDE_GEN_A_CORE
Care for catheter as per unit/ICU protocols
Verbal/written post procedure instructions were given to patient/caregiver/Instructed patient/caregiver to follow-up with primary care physician/Instructed patient/caregiver regarding signs and symptoms of infection
Care for catheter as per unit/ICU protocols

## 2022-06-07 NOTE — PROCEDURE NOTE - NSPROCDETAILS_GEN_ALL_CORE
guidewire recovered/lumen(s) aspirated and flushed/sterile dressing applied/sterile technique, catheter placed/ultrasound guidance with use of sterile gel and probe cove
guidewire recovered/lumen(s) aspirated and flushed/sterile dressing applied/sterile technique, catheter placed/ultrasound guidance with use of sterile gel and probe cove
location identified, draped/prepped, sterile technique used, needle inserted/introduced/positive blood return obtained via catheter/connected to a pressurized flush line/sutured in place/hemostasis with direct pressure, dressing applied/Seldinger technique/all materials/supplies accounted for at end of procedure
guidewire recovered/lumen(s) aspirated and flushed/sterile dressing applied/ultrasound guidance with use of sterile gel and probe cove

## 2022-06-07 NOTE — PROVIDER CONTACT NOTE (OTHER) - RECOMMENDATIONS
Heparin gtt stopped as per order. Ptt and CBC sent as per order. Based on CBC, pt will/ will not be transfused.

## 2022-06-07 NOTE — PROGRESS NOTE ADULT - SUBJECTIVE AND OBJECTIVE BOX
[facility] DEPARTMENT OF OPHTHALMOLOGY  ------------------------------------------------------------------------------  Higinio Victor MD PGY-3  ------------------------------------------------------------------------------    Interval History: No acute events overnight. Following pt for HSV keratitis OD. Patient AOx0, not able to articulate any specific complaints.     MEDICATIONS  (STANDING):  ALBUTerol    90 MICROgram(s) HFA Inhaler 2 Puff(s) Inhalation every 6 hours  albuterol/ipratropium for Nebulization 3 milliLiter(s) Nebulizer every 6 hours  allopurinol 100 milliGRAM(s) Oral daily  aspirin  chewable 81 milliGRAM(s) Oral daily  atorvastatin 40 milliGRAM(s) Oral at bedtime  chlorhexidine 0.12% Liquid 15 milliLiter(s) Oral Mucosa every 12 hours  CRRT Treatment    <Continuous>  diltiazem    Tablet 90 milliGRAM(s) Oral every 8 hours  erythromycin   Ointment 1 Application(s) Right EYE at bedtime  influenza  Vaccine (HIGH DOSE) 0.7 milliLiter(s) IntraMuscular once  insulin lispro (ADMELOG) corrective regimen sliding scale   SubCutaneous every 6 hours  insulin NPH human recombinant 4 Unit(s) SubCutaneous every 6 hours  lidocaine   4% Patch 1 Patch Transdermal daily  multiple electrolytes Injection Type 1 Bolus 1000 milliLiter(s) IV Bolus once  Nephro-reena 1 Tablet(s) Oral daily  ofloxacin 0.3% Solution 1 Drop(s) Right EYE four times a day  pantoprazole Infusion 8 mG/Hr (10 mL/Hr) IV Continuous <Continuous>  phenylephrine    Infusion 1.9 MICROgram(s)/kG/Min (43.1 mL/Hr) IV Continuous <Continuous>  Phoxillum Filtration BK 4 / 2.5 5000 milliLiter(s) (600 mL/Hr) CRRT <Continuous>  Phoxillum Filtration BK 4 / 2.5 5000 milliLiter(s) (1400 mL/Hr) CRRT <Continuous>  povidone iodine 10% Solution 1 Application(s) Topical daily  PrismaSOL Filtration BGK 4 / 2.5 5000 milliLiter(s) (800 mL/Hr) CRRT <Continuous>  sodium chloride 3%  Inhalation 4 milliLiter(s) Inhalation every 12 hours  sucralfate 1 Gram(s) Oral every 12 hours  sucralfate suspension 1 Gram(s) Oral two times a day  trifluridine 1% Solution 1 Drop(s) Right EYE every 3 hours  vasopressin Infusion 0.02 Unit(s)/Min (1.2 mL/Hr) IV Continuous <Continuous>    MEDICATIONS  (PRN):  acetaminophen     Tablet .. 650 milliGRAM(s) Oral every 6 hours PRN Temp greater or equal to 38C (100.4F), Mild Pain (1 - 3)  fentaNYL    Injectable 25 MICROGram(s) IV Push every 2 hours PRN Severe Pain (7 - 10)      VITALS: T(C): 37.2 (06-07-22 @ 15:00)  T(F): 99 (06-07-22 @ 15:00), Max: 99 (06-07-22 @ 07:00)  HR: 107 (06-07-22 @ 15:39) (59 - 121)  BP: 128/74 (06-07-22 @ 08:00) (119/54 - 135/57)  RR:  (11 - 37)  SpO2:  (92% - 100%)  Wt(kg): --  General: AAO x 0, intubated    Ophthalmology Exam:  Visual acuity (cc): unable to assess 2/2 AMS  Pupils: PERRL OU, no APD appreciated on exam  Intraocular Pressure:  18, 20  Extraocular movements (EOMs): DEVANG 2/2 MS  Confrontational Visual Field (CVF): DEVANG 2/2 MS    Pen Light Exam (PLE)  External: no vesicular lesions, no ulcerations, small area of erythema on right frontal forehead, small lateral canthal scab with bleeding on the right eye  Lids/Lashes/Lacrimal Ducts: floppy eyelid, mild erythema, edema of the UL on the right eye; crusting present on lashes, some minor discharge OD. wnl OS.  Sclera/Conjunctiva: trace injection OD. white and quiet OS.  Cornea: No fluorescein staining defects OD, much improved from prior exam OD. clear OS.  Anterior Chamber: Deep and formed OU.    Iris: Flat OU.  Lens: trace NS OU   [facility] DEPARTMENT OF OPHTHALMOLOGY  ------------------------------------------------------------------------------  Higinio Victor MD PGY-3  ------------------------------------------------------------------------------    Interval History: No acute events overnight. Following pt for HSV keratitis OD. Patient AAOx0, not able to articulate any specific complaints.     MEDICATIONS  (STANDING):  ALBUTerol    90 MICROgram(s) HFA Inhaler 2 Puff(s) Inhalation every 6 hours  albuterol/ipratropium for Nebulization 3 milliLiter(s) Nebulizer every 6 hours  allopurinol 100 milliGRAM(s) Oral daily  aspirin  chewable 81 milliGRAM(s) Oral daily  atorvastatin 40 milliGRAM(s) Oral at bedtime  chlorhexidine 0.12% Liquid 15 milliLiter(s) Oral Mucosa every 12 hours  CRRT Treatment    <Continuous>  diltiazem    Tablet 90 milliGRAM(s) Oral every 8 hours  erythromycin   Ointment 1 Application(s) Right EYE at bedtime  influenza  Vaccine (HIGH DOSE) 0.7 milliLiter(s) IntraMuscular once  insulin lispro (ADMELOG) corrective regimen sliding scale   SubCutaneous every 6 hours  insulin NPH human recombinant 4 Unit(s) SubCutaneous every 6 hours  lidocaine   4% Patch 1 Patch Transdermal daily  multiple electrolytes Injection Type 1 Bolus 1000 milliLiter(s) IV Bolus once  Nephro-reena 1 Tablet(s) Oral daily  ofloxacin 0.3% Solution 1 Drop(s) Right EYE four times a day  pantoprazole Infusion 8 mG/Hr (10 mL/Hr) IV Continuous <Continuous>  phenylephrine    Infusion 1.9 MICROgram(s)/kG/Min (43.1 mL/Hr) IV Continuous <Continuous>  Phoxillum Filtration BK 4 / 2.5 5000 milliLiter(s) (600 mL/Hr) CRRT <Continuous>  Phoxillum Filtration BK 4 / 2.5 5000 milliLiter(s) (1400 mL/Hr) CRRT <Continuous>  povidone iodine 10% Solution 1 Application(s) Topical daily  PrismaSOL Filtration BGK 4 / 2.5 5000 milliLiter(s) (800 mL/Hr) CRRT <Continuous>  sodium chloride 3%  Inhalation 4 milliLiter(s) Inhalation every 12 hours  sucralfate 1 Gram(s) Oral every 12 hours  sucralfate suspension 1 Gram(s) Oral two times a day  trifluridine 1% Solution 1 Drop(s) Right EYE every 3 hours  vasopressin Infusion 0.02 Unit(s)/Min (1.2 mL/Hr) IV Continuous <Continuous>    MEDICATIONS  (PRN):  acetaminophen     Tablet .. 650 milliGRAM(s) Oral every 6 hours PRN Temp greater or equal to 38C (100.4F), Mild Pain (1 - 3)  fentaNYL    Injectable 25 MICROGram(s) IV Push every 2 hours PRN Severe Pain (7 - 10)      VITALS: T(C): 37.2 (06-07-22 @ 15:00)  T(F): 99 (06-07-22 @ 15:00), Max: 99 (06-07-22 @ 07:00)  HR: 107 (06-07-22 @ 15:39) (59 - 121)  BP: 128/74 (06-07-22 @ 08:00) (119/54 - 135/57)  RR:  (11 - 37)  SpO2:  (92% - 100%)  Wt(kg): --  General: AAO x 0, intubated    Ophthalmology Exam:  Visual acuity (cc): unable to assess 2/2 AMS  Pupils: PERRL OU, no APD appreciated on exam  Intraocular Pressure:  18, 20  Extraocular movements (EOMs): DEVANG 2/2 MS  Confrontational Visual Field (CVF): DEVANG 2/2 MS    Pen Light Exam (PLE)  External: no vesicular lesions, no ulcerations, small area of erythema on right frontal forehead, small lateral canthal scab with bleeding on the right eye  Lids/Lashes/Lacrimal Ducts: floppy eyelid, mild erythema, edema of the UL on the right eye; crusting present on lashes, some minor discharge OD. wnl OS.  Sclera/Conjunctiva: trace injection OD. white and quiet OS.  Cornea: No fluorescein staining defects OD, much improved from prior exam OD. clear OS.  Anterior Chamber: Deep and formed OU.    Iris: Flat OU.  Lens: trace NS OU

## 2022-06-08 NOTE — CONSULT NOTE ADULT - PROBLEM SELECTOR PROBLEM 2
Acute kidney injury superimposed on CKD
Anemia
CVA (cerebrovascular accident)
Stenosis of right carotid artery

## 2022-06-08 NOTE — PROGRESS NOTE ADULT - PROBLEM SELECTOR PLAN 7
-Carotid duplex with greater than 70% stenosis involving the distal R common carotid artery  -Vascular f/u
Presents with dark urine with hematuria, decreased UO  -CT A/P with no hydronephrosis, nephrolithiasis, or obstruction  -UC negative  -S/p ABX per ID.
W/ hx of multiple stents  -Per cards.
-Carotid duplex with greater than 70% stenosis involving the distal R common carotid artery  -Vascular f/u
W/ hx of multiple stents  -Per cards.
-Carotid duplex with greater than 70% stenosis involving the distal R common carotid artery  -Vascular f/u
-Carotid duplex with greater than 70% stenosis involving the distal R common carotid artery  -Plans for angiogram with possible stent placement. No pulmonary contraindications to planned procedure.
Presents with dark urine with hematuria, decreased UO  -CT A/P with no hydronephrosis, nephrolithiasis, or obstruction  -UC negative  -S/p ABX per ID.
-Carotid duplex with greater than 70% stenosis involving the distal R common carotid artery  -F/u CTA head and neck  -Vascular f/u
Presents with dark urine with hematuria, decreased UO  -CT A/P with no hydronephrosis, nephrolithiasis, or obstruction  -UC negative  -S/p ABX per ID.
-Carotid duplex with greater than 70% stenosis involving the distal R common carotid artery  -Vascular f/u
Presents with dark urine with hematuria, decreased UO  -CT A/P with no hydronephrosis, nephrolithiasis, or obstruction  -UC negative  -S/p ABX per ID.
-Carotid duplex with greater than 70% stenosis involving the distal R common carotid artery  -Vascular f/u

## 2022-06-08 NOTE — CONSULT NOTE ADULT - SUBJECTIVE AND OBJECTIVE BOX
HPI:  NIGHT HOSPITALIST:   Patient remotely known to me from an admission to [facility] in Feb 2017--assigned to me at this point by the ER to admit this 66 y/o M--followed by his nephrologist above--patient with a history of CAD with past multiple PCI, with most recent discharge from [facility] in Feb 2022 for non ST elevation MI with LULU of an 85% prox LAD lesion with patient on ASA and now off Plavix per cardiology (only for one month), chronic atrial fibrillation maintained on Pradaxa, essential HTN, type 2 DM previously on oral medications but on insulin, diabetic neuropathy with chronic RIGHT LE venous stasis changes>left, LEFT foot ulcer seen by podiatry, past endarterectomy LEFT CEA in 2014. with patient self referring to the ER following apparently treatment by an urgent care center for an apparent cystitis with hematuria on nitrofurantoin but with patient noting decreased urine output for the past week, and difficulty with B/L coarse tremors for several weeks, with patient having difficulty negotiating his applications on his smart phone (observed by examiner).  Patient with increasing B/L LE oedema and weight gain, with patient with 2 pillow orthopnoea.      Patient was recommended for a Prado but patient refused, patient wishing to review the issue with his daughter.    Patient with past history of COVID-19 in 12/2021 and has received the COVID-19 vaccine x 2.    NO fever, no chills, no rigors.  No diaphoresis.  No back pain, no tearing back pain. (03 Apr 2022 06:29)    PERTINENT PM/SXH:   HTN (hypertension), benign    HLD (hyperlipidemia)    DM type 2 (diabetes mellitus, type 2)    TIA (transient ischemic attack)    Atrial fibrillation    MI (myocardial infarction)    CAD (coronary artery disease)    Neuropathy    Stage 3 chronic kidney disease    2019 novel coronavirus disease (COVID-19)      Status post angioplasty with stent    S/P carotid endarterectomy    S/P CABG (coronary artery bypass graft)      FAMILY HISTORY:  Family history of heart disease  father    Family history of CVA  mother      Family Hx substance abuse [ ]yes [X ]no  ITEMS NOT CHECKED ARE NOT PRESENT    SOCIAL HISTORY:   Significant other/partner[ X]  Children[X ]  Jewish/Spirituality:  Substance hx:  [ ]   Tobacco hx:  [ ]   Alcohol hx: [ ]   Home Opioid hx:  [ ] I-Stop Reference No:  Living Situation: [X ]Home  [ ]Long term care  [ ]Rehab [ ]Other    ADVANCE DIRECTIVES:    DNR/MOLST  [X ]  Living Will  [ ]   DECISION MAKER(s):  [ ] Health Care Proxy(s)  [ X] Surrogate(s)  [ ] Guardian           Name(s): Phone Number(s):EDGAR: WIFE   BASELINE (I)ADL(s) (prior to admission):  [place]: [ ]Total  [ ] Moderate X[ ]Dependent    Allergies    nitrofurantoin (Nephrotoxicity)    Intolerances    MEDICATIONS  (STANDING):  chlorhexidine 0.12% Liquid 15 milliLiter(s) Oral Mucosa every 12 hours  chlorhexidine 2% Cloths 1 Application(s) Topical <User Schedule>  erythromycin   Ointment 1 Application(s) Right EYE four times a day  lidocaine   4% Patch 1 Patch Transdermal daily  pantoprazole  Injectable 40 milliGRAM(s) IV Push daily  petrolatum Ophthalmic Ointment 1 Application(s) Left EYE four times a day    MEDICATIONS  (PRN):  HYDROmorphone  Injectable 1 milliGRAM(s) IV Push every 2 hours PRN Vent Sync  LORazepam   Injectable 1 milliGRAM(s) IV Push every 2 hours PRN Anxiety    PRESENT SYMPTOMS: [X ]Unable to self-report  [X ] CPOT [ ] PAINADs [ ] RDOS  Source if other than patient:  [ ]Family   [ ]Team     Pain: [ ]yes [ ]no  QOL impact -   Location -                    Aggravating factors -  Quality -  Radiation -  Timing-  Severity (0-10 scale):  Minimal acceptable level (0-10 scale):     CPOT:    https://www.[facility].org/getattachment/[email or web address]/Critical-Care-Pain-Observation-Tool-(CPOT)    PAIN AD Score:   http://geriatrictoolkit.[place].[facility]/cog/painad.pdf (press ctrl +  left click to view)    Dyspnea:                           [ ]Mild [ ]Moderate [ ]Severe      RDOS:  0 to 2  minimal or no respiratory distress   3  mild distress  4 to 6 moderate distress  >7 severe distress  https://homecareinformation.net/handouts/hen/Respiratory_Distress_Observation_Scale.pdf (Ctrl +  left click to view)     Anxiety:                             [ ]Mild [ ]Moderate [ ]Severe  Fatigue:                             [ ]Mild [ ]Moderate [ ]Severe  Nausea:                             [ ]Mild [ ]Moderate [ ]Severe  Loss of appetite:              [ ]Mild [ ]Moderate [ ]Severe  Constipation:                    [ ]Mild [ ]Moderate [ ]Severe    Other Symptoms:  [ ]All other review of systems negative     Palliative Performance Status Version 2:         %    http://[facility].org/files/news/palliative_performance_scale_ppsv2.pdf  PHYSICAL EXAM:  Vital Signs Last 24 Hrs  T(C): 36.7 (08 Jun 2022 07:00), Max: 36.7 (08 Jun 2022 07:00)  T(F): 98.1 (08 Jun 2022 07:00), Max: 98.1 (08 Jun 2022 07:00)  HR: 136 (08 Jun 2022 15:02) (107 - 140)  BP: --  BP(mean): --  RR: 20 (08 Jun 2022 07:00) (14 - 35)  SpO2: 100% (08 Jun 2022 15:02) (81% - 100%) I&O's Summary    07 Jun 2022 07:01  -  08 Jun 2022 07:00  --------------------------------------------------------  IN: 2579.3 mL / OUT: 0 mL / NET: 2579.3 mL      GENERAL: [ ]Cachexia    [ ]Alert  [ ]Oriented x   [ ]Lethargic  [ ]Unarousable  [ ]Verbal  [ ]Non-Verbal  Behavioral:   [ ] Anxiety  [ ] Delirium [ ] Agitation [ ] Other  HEENT:  [ ]Normal   [ ]Dry mouth   [ ]ET Tube/Trach  [ ]Oral lesions  PULMONARY:   [ ]Clear [ ]Tachypnea  [ ]Audible excessive secretions   [ ]Rhonchi        [ ]Right [ ]Left [ ]Bilateral  [ ]Crackles        [ ]Right [ ]Left [ ]Bilateral  [ ]Wheezing     [ ]Right [ ]Left [ ]Bilateral  [ ]Diminished breath sounds [ ]right [ ]left [ ]bilateral  CARDIOVASCULAR:    [ ]Regular [ ]Irregular [ ]Tachy  [ ]Bo [ ]Murmur [ ]Other  GASTROINTESTINAL:  [ ]Soft  [ ]Distended   [ ]+BS  [ ]Non tender [ ]Tender  [ ]Other [ ]PEG [ ]OGT/ NGT  Last BM:  GENITOURINARY:  [ ]Normal [ ] Incontinent   [ ]Oliguria/Anuria   [ ]Prado  MUSCULOSKELETAL:   [ ]Normal   [ ]Weakness  [ ]Bed/Wheelchair bound [ ]Edema  NEUROLOGIC:   [ ]No focal deficits  [ ]Cognitive impairment  [ ]Dysphagia [ ]Dysarthria [ ]Paresis [ ]Other   SKIN:   [ ]Normal  [ ]Rash  [ ]Other  [ ]Pressure ulcer(s)       Present on admission [ ]y [ ]n    CRITICAL CARE:  [ ] Shock Present  [ ]Septic [ ]Cardiogenic [ ]Neurologic [ ]Hypovolemic  [ ]  Vasopressors [ ]  Inotropes   [ ]Respiratory failure present [ ]Mechanical ventilation [ ]Non-invasive ventilatory support [ ]High flow  Mode: AC/ CMV (Assist Control/ Continuous Mandatory Ventilation), RR (machine): 16, TV (machine): 500, FiO2: 40, PEEP: 5, ITime: 1, MAP: 11, PIP: 22  [ ]Acute  [ ]Chronic [ ]Hypoxic  [ ]Hypercarbic [ ]Other  [ ]Other organ failure     LABS:                        7.8    24.02 )-----------( 228      ( 07 Jun 2022 13:21 )             23.5   06-06    139  |  102  |  72<H>  ----------------------------<  232<H>  4.5   |  24  |  5.15<H>    Ca    8.6      06 Jun 2022 20:11  Phos  4.5     06-07  Mg     2.2     06-06    PT/INR - ( 06 Jun 2022 21:21 )   PT: 19.7 sec;   INR: 1.70 ratio         PTT - ( 07 Jun 2022 10:59 )  PTT:28.5 sec      RADIOLOGY & ADDITIONAL STUDIES:    PROTEIN CALORIE MALNUTRITION PRESENT: [ ]mild [ ]moderate [ ]severe [ ]underweight [ ]morbid obesity  https://www.andeal.org/vault/2440/web/files/ONC/Table_Clinical%20Characteristics%20to%20Document%20Malnutrition-White%20JV%20et%20al%202012.pdf    Height (cm): 180.3 (06-06-22 @ 17:25), 180.3 (02-18-22 @ 13:42), 180.3 (10-21-21 @ 12:00)  Weight (kg): 115.3 (06-07-22 @ 14:15), 134.3 (02-19-22 @ 00:44), 135.9 (10-21-21 @ 21:44)  BMI (kg/m2): 35.5 (06-07-22 @ 14:15), 41.3 (06-06-22 @ 17:25), 41.3 (02-19-22 @ 00:44)    [ ]PPSV2 < or = to 30% [ ]significant weight loss  [ ]poor nutritional intake  [ ]anasarca[ ]Artificial Nutrition      REFERRALS:   [ ]Chaplaincy  [ ]Hospice  [ ]Child Life  [ ]Social Work  [ ]Case management [ ]Holistic Therapy     Goals of Care Document:  HPI:  NIGHT HOSPITALIST:   Patient remotely known to me from an admission to [facility] in Feb 2017--assigned to me at this point by the ER to admit this 68 y/o M--followed by his nephrologist above--patient with a history of CAD with past multiple PCI, with most recent discharge from [facility] in Feb 2022 for non ST elevation MI with LULU of an 85% prox LAD lesion with patient on ASA and now off Plavix per cardiology (only for one month), chronic atrial fibrillation maintained on Pradaxa, essential HTN, type 2 DM previously on oral medications but on insulin, diabetic neuropathy with chronic RIGHT LE venous stasis changes>left, LEFT foot ulcer seen by podiatry, past endarterectomy LEFT CEA in 2014. with patient self referring to the ER following apparently treatment by an urgent care center for an apparent cystitis with hematuria on nitrofurantoin but with patient noting decreased urine output for the past week, and difficulty with B/L coarse tremors for several weeks, with patient having difficulty negotiating his applications on his smart phone (observed by examiner).  Patient with increasing B/L LE oedema and weight gain, with patient with 2 pillow orthopnoea.      Patient was recommended for a Prado but patient refused, patient wishing to review the issue with his daughter.    Patient with past history of COVID-19 in 12/2021 and has received the COVID-19 vaccine x 2.    NO fever, no chills, no rigors.  No diaphoresis.  No back pain, no tearing back pain. (03 Apr 2022 06:29)    PERTINENT PM/SXH:   HTN (hypertension), benign    HLD (hyperlipidemia)    DM type 2 (diabetes mellitus, type 2)    TIA (transient ischemic attack)    Atrial fibrillation    MI (myocardial infarction)    CAD (coronary artery disease)    Neuropathy    Stage 3 chronic kidney disease    2019 novel coronavirus disease (COVID-19)      Status post angioplasty with stent    S/P carotid endarterectomy    S/P CABG (coronary artery bypass graft)      FAMILY HISTORY:  Family history of heart disease  father    Family history of CVA  mother      Family Hx substance abuse [ ]yes [X ]no  ITEMS NOT CHECKED ARE NOT PRESENT    SOCIAL HISTORY:   Significant other/partner[ X]  Children[X ]  Voodoo/Spirituality:  Substance hx:  [ ]   Tobacco hx:  [ ]   Alcohol hx: [ ]   Home Opioid hx:  [ ] I-Stop Reference No:  Living Situation: [X ]Home  [ ]Long term care  [ ]Rehab [ ]Other    ADVANCE DIRECTIVES:    DNR/MOLST  [X ]  Living Will  [ ]   DECISION MAKER(s):  [ ] Health Care Proxy(s)  [ X] Surrogate(s)  [ ] Guardian           Name(s): Phone Number(s):EDGAR: WIFE   BASELINE (I)ADL(s) (prior to admission):  [place]: [ ]Total  [ ] Moderate X[ ]Dependent    Allergies    nitrofurantoin (Nephrotoxicity)    Intolerances    MEDICATIONS  (STANDING):  chlorhexidine 0.12% Liquid 15 milliLiter(s) Oral Mucosa every 12 hours  chlorhexidine 2% Cloths 1 Application(s) Topical <User Schedule>  erythromycin   Ointment 1 Application(s) Right EYE four times a day  lidocaine   4% Patch 1 Patch Transdermal daily  pantoprazole  Injectable 40 milliGRAM(s) IV Push daily  petrolatum Ophthalmic Ointment 1 Application(s) Left EYE four times a day    MEDICATIONS  (PRN):  HYDROmorphone  Injectable 1 milliGRAM(s) IV Push every 2 hours PRN Vent Sync  LORazepam   Injectable 1 milliGRAM(s) IV Push every 2 hours PRN Anxiety    PRESENT SYMPTOMS: [X ]Unable to self-report  [X ] CPOT [ ] PAINADs [ ] RDOS  Source if other than patient:  [ ]Family   [ ]Team     Pain: [ ]yes [ ]no  QOL impact -   Location -                    Aggravating factors -  Quality -  Radiation -  Timing-  Severity (0-10 scale):  Minimal acceptable level (0-10 scale):     CPOT:  4  https://www.[facility].org/getattachment/[email or web address]/Critical-Care-Pain-Observation-Tool-(CPOT)    PAIN AD Score:   http://geriatrictoolkit.[place].[facility]/cog/painad.pdf (press ctrl +  left click to view)    Dyspnea:                           [ ]Mild [ ]Moderate [ ]Severe      RDOS:  0 to 2  minimal or no respiratory distress   3  mild distress  4 to 6 moderate distress  >7 severe distress  https://homecareinformation.net/handouts/hen/Respiratory_Distress_Observation_Scale.pdf (Ctrl +  left click to view)     Anxiety:                             [ ]Mild [ ]Moderate [ ]Severe  Fatigue:                             [ ]Mild [ ]Moderate [ ]Severe  Nausea:                             [ ]Mild [ ]Moderate [ ]Severe  Loss of appetite:              [ ]Mild [ ]Moderate [ ]Severe  Constipation:                    [ ]Mild [ ]Moderate [ ]Severe    Other Symptoms:  [ ]All other review of systems negative     Palliative Performance Status Version 2:     20   %    http://[facility].org/files/news/palliative_performance_scale_ppsv2.pdf  PHYSICAL EXAM:  Vital Signs Last 24 Hrs  T(C): 36.7 (08 Jun 2022 07:00), Max: 36.7 (08 Jun 2022 07:00)  T(F): 98.1 (08 Jun 2022 07:00), Max: 98.1 (08 Jun 2022 07:00)  HR: 136 (08 Jun 2022 15:02) (107 - 140)  BP: --  BP(mean): --  RR: 20 (08 Jun 2022 07:00) (14 - 35)  SpO2: 100% (08 Jun 2022 15:02) (81% - 100%) I&O's Summary    07 Jun 2022 07:01  -  08 Jun 2022 07:00  --------------------------------------------------------  IN: 2579.3 mL / OUT: 0 mL / NET: 2579.3 mL      GENERAL: [ ]Cachexia    [ ]Alert  [ ]Oriented x   [ ]Lethargic  [ x]Unarousable  [ ]Verbal  [x ]Non-Verbal  Behavioral:   [ ] Anxiety  [ ] Delirium [ ] Agitation [ ] Other  HEENT:  [ ]Normal   [ ]Dry mouth   [ x]ET Tube/Trach  [ ]Oral lesions  PULMONARY:   [ ]Clear [ ]Tachypnea  [ x]Audible excessive secretions   [ ]Rhonchi        [ ]Right [ ]Left [ ]Bilateral  [x ]Crackles        [ ]Right [ ]Left [x ]Bilateral  [ ]Wheezing     [ ]Right [ ]Left [ ]Bilateral  [ ]Diminished breath sounds [ ]right [ ]left [ ]bilateral  CARDIOVASCULAR:    [ ]Regular [x ]Irregular [ ]Tachy  [ ]Bo [ ]Murmur [ ]Other  GASTROINTESTINAL:  [ ]Soft  [ ]Distended   [ ]+BS  [ ]Non tender [ ]Tender  [ ]Other [ ]PEG [ ]OGT/ NGT  Last BM:  GENITOURINARY:  [ ]Normal [ x] Incontinent   [ ]Oliguria/Anuria   [ ]Prado  MUSCULOSKELETAL:   [ ]Normal   [ ]Weakness  [ x]Bed/Wheelchair bound [ ]Edema  NEUROLOGIC:   [ ]No focal deficits  [x ]Cognitive impairment  [ ]Dysphagia [ ]Dysarthria [ ]Paresis [ ]Other   SKIN:   [ ]Normal  [ ]Rash  [ ]Other  [ ]Pressure ulcer(s)       Present on admission [ ]y [ ]n    CRITICAL CARE:  [ ] Shock Present  [ ]Septic [ ]Cardiogenic [ ]Neurologic [ ]Hypovolemic  [ ]  Vasopressors [ ]  Inotropes   x[ ]Respiratory failure present [ x]Mechanical ventilation [ ]Non-invasive ventilatory support [ ]High flow  Mode: AC/ CMV (Assist Control/ Continuous Mandatory Ventilation), RR (machine): 16, TV (machine): 500, FiO2: 40, PEEP: 5, ITime: 1, MAP: 11, PIP: 22  [ ]Acute  [ ]Chronic [ ]Hypoxic  [ ]Hypercarbic [ ]Other  [ ]Other organ failure     LABS:                        7.8    24.02 )-----------( 228      ( 07 Jun 2022 13:21 )             23.5   06-06    139  |  102  |  72<H>  ----------------------------<  232<H>  4.5   |  24  |  5.15<H>    Ca    8.6      06 Jun 2022 20:11  Phos  4.5     06-07  Mg     2.2     06-06    PT/INR - ( 06 Jun 2022 21:21 )   PT: 19.7 sec;   INR: 1.70 ratio         PTT - ( 07 Jun 2022 10:59 )  PTT:28.5 sec      RADIOLOGY & ADDITIONAL STUDIES:    < from: CT Angio Abdomen and Pelvis w/ IV Cont (06.07.22 @ 13:43) >    ACC: [id number] EXAM:  CT ANGIO ABD PELV (W)AW IC                          PROCEDURE DATE:  06/07/2022          INTERPRETATION:  CLINICAL INFORMATION: Hemorrhagic shock,   gastrointestinal bleeding.    COMPARISON: CT abdomen and pelvis 4/2/2022.    CONTRAST/COMPLICATIONS:  IV Contrast: Omnipaque 350  90 cc administered   0 cc discarded  Oral Contrast: NONE  Complications: None reported at time of study completion    PROCEDURE:  CT of the Abdomen and Pelvis was performed.  Precontrast, Arterial and Delayed phases were performed.  Sagittal and coronal reformats were performed.    FINDINGS:  LOWER CHEST: Trace right and small left pleural effusions with adjacent   passive atelectasis. Scattered calcified granulomas at the lung bases.   Coronary artery calcification. Aortic valvular and mitral annular   calcification. Central venous catheter tip terminates in the right   atrium. Hypoattenuation of cardiac blood pool consistent with anemia.    LIVER: Within normal limits.  BILE DUCTS: Normal caliber.  GALLBLADDER: Layering sludge versus vicarious excretion.  SPLEEN: Within normal limits.  PANCREAS: Within normal limits.  ADRENALS: Within normal limits.  KIDNEYS/URETERS: No hydronephrosis or nephrolithiasis. Unchanged left   greater than right perinephric stranding.    BLADDER: Minimally distended.  REPRODUCTIVE ORGANS: Prostate within normal limits.    BOWEL: High attenuation intraluminal material is noted in the distal   descending and proximal horizontal segment of the duodenum consistent   with active bleeding (6:67-6:70). Enteric tube tip in stomach. :   Diverticulosis without diverticulitis. Again noted segment of   non-obstructed sigmoid colon within a large right inguinal hernia No   bowel obstruction. Appendix is not visualized. No evidence of   inflammation in the pericecal region.  PERITONEUM: No ascites.  VESSELS: Extensive atherosclerotic disease with near complete occlusion   of the mid SMA. There is apparent occlusion of the visualized left   superficial femoral artery. (6:170). Status post gastroduodenal artery   embolization.  RETROPERITONEUM/LYMPH NODES: No lymphadenopathy.  ABDOMINAL WALL: Bilateral inguinal hernias, the right contains segment of   nonobstructed sigmoid colon as above.  BONES: Degenerativechanges.    IMPRESSION:    Contrast extravasation into the distal descending and proximal horizontal   segments of the duodenum consistent with active bleeding.    Status post embolization of the GDA.    Extensive atherosclerotic disease as above.    Small left and trace right pleural effusions.    These findings were discussed with Dr. Thomas at 6/7/2022 3:09 PM by Dr. Darling with read back confirmation.    --- End of Report ---      I  PROTEIN CALORIE MALNUTRITION PRESENT: [ ]mild [ ]moderate [ ]severe [ ]underweight [ ]morbid obesity  https://www.andeal.org/vault/3320/web/files/ONC/Table_Clinical%20Characteristics%20to%20Document%20Malnutrition-White%20JV%20et%20al%897173.pdf    Height (cm): 180.3 (06-06-22 @ 17:25), 180.3 (02-18-22 @ 13:42), 180.3 (10-21-21 @ 12:00)  Weight (kg): 115.3 (06-07-22 @ 14:15), 134.3 (02-19-22 @ 00:44), 135.9 (10-21-21 @ 21:44)  BMI (kg/m2): 35.5 (06-07-22 @ 14:15), 41.3 (06-06-22 @ 17:25), 41.3 (02-19-22 @ 00:44)    [ ]PPSV2 < or = to 30% [ ]significant weight loss  [ ]poor nutritional intake  [ ]anasarca[ ]Artificial Nutrition      REFERRALS:   [ ]Chaplaincy  [ ]Hospice  [ ]Child Life  [ ]Social Work  [ ]Case management [ ]Holistic Therapy     Goals of Care Document:

## 2022-06-08 NOTE — PROGRESS NOTE ADULT - SUBJECTIVE AND OBJECTIVE BOX
Follow-up Pulm Progress Note    intubated/unresponsive  family opted for comfort care     Medications:  MEDICATIONS  (STANDING):  chlorhexidine 0.12% Liquid 15 milliLiter(s) Oral Mucosa every 12 hours  chlorhexidine 2% Cloths 1 Application(s) Topical <User Schedule>  erythromycin   Ointment 1 Application(s) Right EYE four times a day  lidocaine   4% Patch 1 Patch Transdermal daily  pantoprazole  Injectable 40 milliGRAM(s) IV Push daily  petrolatum Ophthalmic Ointment 1 Application(s) Left EYE four times a day    MEDICATIONS  (PRN):  HYDROmorphone  Injectable 1 milliGRAM(s) IV Push every 2 hours PRN Vent Sync  LORazepam   Injectable 1 milliGRAM(s) IV Push every 2 hours PRN Anxiety      Mode: AC/ CMV (Assist Control/ Continuous Mandatory Ventilation)  RR (machine): 16  TV (machine): 500  FiO2: 40  PEEP: 5  ITime: 1  MAP: 12  PIP: 23      Vital Signs Last 24 Hrs  T(C): 36.7 (08 Jun 2022 07:00), Max: 37.2 (07 Jun 2022 15:00)  T(F): 98.1 (08 Jun 2022 07:00), Max: 99 (07 Jun 2022 15:00)  HR: 122 (08 Jun 2022 08:55) (107 - 140)  BP: --  BP(mean): --  RR: 20 (08 Jun 2022 07:00) (14 - 35)  SpO2: 100% (08 Jun 2022 08:55) (81% - 100%)    ABG - ( 07 Jun 2022 11:03 )  pH, Arterial: 7.41  pH, Blood: x     /  pCO2: 31    /  pO2: 182   / HCO3: 20    / Base Excess: -4.5  /  SaO2: 99.1                  06-07 @ 07:01  -  06-08 @ 07:00  --------------------------------------------------------  IN: 2579.3 mL / OUT: 0 mL / NET: 2579.3 mL          LABS:                        7.8    24.02 )-----------( 228      ( 07 Jun 2022 13:21 )             23.5     06-06    139  |  102  |  72<H>  ----------------------------<  232<H>  4.5   |  24  |  5.15<H>    Ca    8.6      06 Jun 2022 20:11  Phos  4.5     06-07  Mg     2.2     06-06            CAPILLARY BLOOD GLUCOSE      POCT Blood Glucose.: 274 mg/dL (07 Jun 2022 17:53)    PT/INR - ( 06 Jun 2022 21:21 )   PT: 19.7 sec;   INR: 1.70 ratio         PTT - ( 07 Jun 2022 10:59 )  PTT:28.5 sec                    CULTURES:     Culture - Blood (collected 06-04-22 @ 22:49)  Source: .Blood Blood-Peripheral  Preliminary Report (06-06-22 @ 06:01):    No growth to date.    Culture - Blood (collected 05-31-22 @ 02:33)  Source: .Blood Blood  Final Report (06-05-22 @ 09:00):    No Growth Final    Culture - Blood (collected 05-31-22 @ 02:33)  Source: .Blood Blood  Final Report (06-05-22 @ 09:00):    No Growth Final        Physical Examination:  PULM: coarse bilaterally   CVS: S1, S2 heard    RADIOLOGY REVIEWED  CXR 6/7: small b/l pl effusions/atelectasis

## 2022-06-08 NOTE — PROGRESS NOTE ADULT - ASSESSMENT
ESRD.    s/p CVA / acute infarcts    Chronic atrial fibrillation.     GI bleed  hemmohagic shock      duodenal ulcers  esophagitis    Type 2 diabetes mellitus      CAD (coronary artery disease).   a fib    carotid duplex with stenosis       pl effusion         DVT prophylaxis    pt now made comfort care

## 2022-06-08 NOTE — PROGRESS NOTE ADULT - ASSESSMENT
Impression:  This is a 67 year old gentleman pmhx CAD s/p MI/PCI/CABG, hx TIA, afib on rivaroxaban, HTN, DM2, PVD, gout, L CEA 2014, and CKD who presented to [facility] 4/11/22 with ANT, tremors, confusion and lethargy.  CT head no acute changes.  S/p initiation of hemodialysis.  Mental status had improved dramatically.  Pt denies any headaches, no slurred speech, no hemiparesis.  He has chronic gout with bilateral finger swelling, pain.  He also has chronic neuropathy.  Both legs are weak.      Persistent encephalopathy prompted re-consultation on 5/10/22.  MRI brain was ordered and revealed small acute infarctions in bilateral posterior centrum semiovale and left corona radiata and left posterior periventricular white matter.  Carotid ultrasound was performed showing new occlusion of the left internal carotid artery, along with greater than 70% stenosis involving the distal right common carotid artery, and mild to moderate flow-limiting stenosis is seen at the left external carotid artery.  Vascular surgery has been consulted and is recommending CTA.  He continues to be encephalopathic and lethargic.      5/30 stroke code activated for altered mental status.   MRI brain shows new small, scattered infarcts, more in left hemisphere.  Unclear if cause of encephalopathy   s/p DU clipping, needed transfusion on 6/1 cammie  6/7/22: in NSICU intubated not sedated     Diagnosis and Recommendations:  1.  Multifactorial delirium - has been waxing and waning through his hospital course.  Video EEG unremarkable for seizures.  Declines in level of mentation in setting of medical illness.  GIB requiring transfusion and still with melena.      2. Complete left ICA occlusion and 70% distal CCA stenosis. Has collateral flow via acomm and pcomm but should be re-evaluated for either CEA or carotid stenting. CEA may be ideal considering issues with medication compliance and potential difficulty adhering to DAPT regimen but defer to proceduralists regarding this. On 5/25, discussed extensively with son at bedside: all options here have risk associated with them.  Opening the artery may or may not help.  However, it will reduce long-term stroke risk.  It might help with his mental status by helping brain perfusion, though this cannot be guaranteed.    - MRA was suboptimal not visualizing the neck but MRA head showed left KESHAWN coming from ACOM and left PCA feeding left MCA.  ICA showed no flow.    - in setting of atrial fibrillation infarcts could potentially also be cardioembolic, continue anticoagulation as per cardiology  - s/p carotid doppler, suboptimal CTA/MRA.  However, it is apparent that there is a proximal left ICA occlusion, which would not be a candidate for intervention.  Intervention for right CCA/ICA stenosis would be high risk but it may indeed by a symptomatic stenosis.   - ECHO in February 2022 did not reveal severe cardiomyopathy, vegetation, or LV thrombus.    - HgA1c of 11 also driving stroke risk.  Goal is < 7  - continue high-intensity statin therapy    Pt now with GIB, hypotension and intubated.  exam now shows evidence of new brain/brainstem dysfunction  unable to get CT but unlikely to change the outcome  as per team, pt is comfort care  as such no further neuro eval is needed  will sign off

## 2022-06-08 NOTE — PROGRESS NOTE ADULT - SUBJECTIVE AND OBJECTIVE BOX
HPI:  Patient remotely known to me from an admission to [facility] in Feb 2017--assigned to me at this point by the ER to admit this 68 y/o M--followed by his nephrologist above--patient with a history of CAD with past multiple PCI, with most recent discharge from [facility] in Feb 2022 for non ST elevation MI with LULU of an 85% prox LAD lesion with patient on ASA and now off Plavix per cardiology (only for one month), chronic atrial fibrillation maintained on Pradaxa, essential HTN, type 2 DM previously on oral medications but on insulin, diabetic neuropathy with chronic RIGHT LE venous stasis changes>left, LEFT foot ulcer seen by podiatry, past endarterectomy LEFT CEA in 2014. with patient self referring to the ER following apparently treatment by an urgent care center for an apparent cystitis with hematuria on nitrofurantoin but with patient noting decreased urine output for the past week, and difficulty with B/L coarse tremors for several weeks, with patient having difficulty negotiating his applications on his smart phone (observed by examiner).  Patient with increasing B/L LE oedema and weight gain, with patient with 2 pillow orthopnoea.    Upper GI bleeding  Hemorrhagic shock  DNR    VITALS:  T(C): , Max: 37.2 (06-07-22 @ 07:00)  HR:  (81 - 137)  BP:  (120/65 - 128/74)  ABP:  (108/33 - 181/64)  RR:  (11 - 35)  SpO2:  (81% - 100%)  Wt(kg): --  Device: Avea, Mode: AC/ CMV (Assist Control/ Continuous Mandatory Ventilation), RR (machine): 16, TV (machine): 500, FiO2: 40, PEEP: 5, ITime: 1, MAP: 12, PIP: 22    06-06-22 @ 07:01  -  06-07-22 @ 07:00  --------------------------------------------------------  IN: 2165.5 mL / OUT: 0 mL / NET: 2165.5 mL    06-07-22 @ 07:01  -  06-08-22 @ 06:26  --------------------------------------------------------  IN: 2579.3 mL / OUT: 0 mL / NET: 2579.3 mL      LABS:  Na: 139 (06-06 @ 20:11), 137 (06-06 @ 01:28)  K: 4.5 (06-06 @ 20:11), 4.3 (06-06 @ 01:28)  Cl: 102 (06-06 @ 20:11), 100 (06-06 @ 01:28)  CO2: 24 (06-06 @ 20:11), 24 (06-06 @ 01:28)  BUN: 72 (06-06 @ 20:11), 67 (06-06 @ 01:28)  Cr: 5.15 (06-06 @ 20:11), 4.57 (06-06 @ 01:28)  Glu: 232(06-06 @ 20:11), 242(06-06 @ 01:28)    Hgb: 7.8 (06-07 @ 13:21), 7.3 (06-07 @ 10:23), 6.8 (06-07 @ 06:32), 7.3 (06-07 @ 03:21), 8.8 (06-06 @ 20:11), 6.8 (06-06 @ 15:04), 7.5 (06-06 @ 07:21), 7.6 (06-06 @ 01:28), 7.0 (06-05 @ 17:46), 7.5 (06-05 @ 11:33)  Hct: 23.5 (06-07 @ 13:21), 22.5 (06-07 @ 10:23), 20.7 (06-07 @ 06:32), 22.3 (06-07 @ 03:21), 27.5 (06-06 @ 20:11), 21.6 (06-06 @ 15:04), 23.9 (06-06 @ 07:21), 24.4 (06-06 @ 01:28), 22.3 (06-05 @ 17:46), 24.8 (06-05 @ 11:33)  WBC: 24.02 (06-07 @ 13:21), 21.85 (06-07 @ 10:23), 19.15 (06-07 @ 06:32), 21.24 (06-07 @ 03:21), 16.12 (06-06 @ 20:11), 19.23 (06-06 @ 15:04), 17.74 (06-06 @ 07:21), 18.13 (06-06 @ 01:28), 18.65 (06-05 @ 17:46), 17.70 (06-05 @ 11:33)  Plt: 228 (06-07 @ 13:21), 237 (06-07 @ 10:23), 248 (06-07 @ 06:32), 279 (06-07 @ 03:21), 234 (06-06 @ 20:11), 273 (06-06 @ 15:04), 275 (06-06 @ 07:21), 264 (06-06 @ 01:28), 275 (06-05 @ 17:46), 268 (06-05 @ 11:33)    INR: 1.70 06-06-22 @ 21:21, 1.86 06-06-22 @ 07:21  PTT: 28.5 06-07-22 @ 10:59, 86.2 06-07-22 @ 03:21, 31.2 06-06-22 @ 21:21, 31.7 06-06-22 @ 07:21    IMAGING:   Recent imaging studies were reviewed.    MEDICATIONS:  chlorhexidine 0.12% Liquid 15 milliLiter(s) Oral Mucosa every 12 hours  erythromycin   Ointment 1 Application(s) Right EYE four times a day  lidocaine   4% Patch 1 Patch Transdermal daily  LORazepam   Injectable 1 milliGRAM(s) IV Push every 2 hours PRN  morphine  - Injectable 2 milliGRAM(s) IV Push every 2 hours PRN  multiple electrolytes Injection Type 1 Bolus 1000 milliLiter(s) IV Bolus once  petrolatum Ophthalmic Ointment 1 Application(s) Left EYE four times a day  trifluridine 1% Solution 1 Drop(s) Right EYE <User Schedule>    EXAMINATION:  General:  calm, intubated  HEENT:  Right conjunctival discharge and congestion   Neuro:  opens eyes to verbal stimuli, Agitated,  following commands, Left side AG, right sided weakness UE 2/5, LE 2/5  Cards:  RRR  Respiratory:  no respiratory distress  Adomen:  soft  Extremities:  ischemic changes   Skin:  warm/dry   HPI:  Patient remotely known to me from an admission to [facility] in Feb 2017--assigned to me at this point by the ER to admit this 66 y/o M--followed by his nephrologist above--patient with a history of CAD with past multiple PCI, with most recent discharge from [facility] in Feb 2022 for non ST elevation MI with LULU of an 85% prox LAD lesion with patient on ASA and now off Plavix per cardiology (only for one month), chronic atrial fibrillation maintained on Pradaxa, essential HTN, type 2 DM previously on oral medications but on insulin, diabetic neuropathy with chronic RIGHT LE venous stasis changes>left, LEFT foot ulcer seen by podiatry, past endarterectomy LEFT CEA in 2014. with patient self referring to the ER following apparently treatment by an urgent care center for an apparent cystitis with hematuria on nitrofurantoin but with patient noting decreased urine output for the past week, and difficulty with B/L coarse tremors for several weeks, with patient having difficulty negotiating his applications on his smart phone (observed by examiner).  Patient with increasing B/L LE oedema and weight gain, with patient with 2 pillow orthopnoea.    Upper GI bleeding  Hemorrhagic shock  DNR    T(C): 36.7 (06-08-22 @ 07:00), Max: 37.2 (06-07-22 @ 15:00)  HR: 140 (06-08-22 @ 07:00) (82 - 140)  BP: --  RR: 20 (06-08-22 @ 07:00) (11 - 35)  SpO2: 94% (06-08-22 @ 07:00) (81% - 100%)  06-07-22 @ 07:01  -  06-08-22 @ 07:00  --------------------------------------------------------  IN: 2579.3 mL / OUT: 0 mL / NET: 2579.3 mL    chlorhexidine 0.12% Liquid 15 milliLiter(s) Oral Mucosa every 12 hours  erythromycin   Ointment 1 Application(s) Right EYE four times a day  lidocaine   4% Patch 1 Patch Transdermal daily  LORazepam   Injectable 1 milliGRAM(s) IV Push every 2 hours PRN  morphine  - Injectable 2 milliGRAM(s) IV Push every 2 hours PRN  multiple electrolytes Injection Type 1 Bolus 1000 milliLiter(s) IV Bolus once  petrolatum Ophthalmic Ointment 1 Application(s) Left EYE four times a day  trifluridine 1% Solution 1 Drop(s) Right EYE <User Schedule>  Mode: AC/ CMV (Assist Control/ Continuous Mandatory Ventilation), RR (machine): 16, TV (machine): 500, FiO2: 40, PEEP: 5, ITime: 1, MAP: 12, PIP: 22      LABS:  Na: 139 (06-06 @ 20:11), 137 (06-06 @ 01:28)  K: 4.5 (06-06 @ 20:11), 4.3 (06-06 @ 01:28)  Cl: 102 (06-06 @ 20:11), 100 (06-06 @ 01:28)  CO2: 24 (06-06 @ 20:11), 24 (06-06 @ 01:28)  BUN: 72 (06-06 @ 20:11), 67 (06-06 @ 01:28)  Cr: 5.15 (06-06 @ 20:11), 4.57 (06-06 @ 01:28)  Glu: 232(06-06 @ 20:11), 242(06-06 @ 01:28)    Hgb: 7.8 (06-07 @ 13:21), 7.3 (06-07 @ 10:23), 6.8 (06-07 @ 06:32), 7.3 (06-07 @ 03:21), 8.8 (06-06 @ 20:11), 6.8 (06-06 @ 15:04), 7.5 (06-06 @ 07:21), 7.6 (06-06 @ 01:28), 7.0 (06-05 @ 17:46), 7.5 (06-05 @ 11:33)  Hct: 23.5 (06-07 @ 13:21), 22.5 (06-07 @ 10:23), 20.7 (06-07 @ 06:32), 22.3 (06-07 @ 03:21), 27.5 (06-06 @ 20:11), 21.6 (06-06 @ 15:04), 23.9 (06-06 @ 07:21), 24.4 (06-06 @ 01:28), 22.3 (06-05 @ 17:46), 24.8 (06-05 @ 11:33)  WBC: 24.02 (06-07 @ 13:21), 21.85 (06-07 @ 10:23), 19.15 (06-07 @ 06:32), 21.24 (06-07 @ 03:21), 16.12 (06-06 @ 20:11), 19.23 (06-06 @ 15:04), 17.74 (06-06 @ 07:21), 18.13 (06-06 @ 01:28), 18.65 (06-05 @ 17:46), 17.70 (06-05 @ 11:33)  Plt: 228 (06-07 @ 13:21), 237 (06-07 @ 10:23), 248 (06-07 @ 06:32), 279 (06-07 @ 03:21), 234 (06-06 @ 20:11), 273 (06-06 @ 15:04), 275 (06-06 @ 07:21), 264 (06-06 @ 01:28), 275 (06-05 @ 17:46), 268 (06-05 @ 11:33)    INR: 1.70 06-06-22 @ 21:21, 1.86 06-06-22 @ 07:21  PTT: 28.5 06-07-22 @ 10:59, 86.2 06-07-22 @ 03:21, 31.2 06-06-22 @ 21:21, 31.7 06-06-22 @ 07:21      EXAMINATION:  General:  calm, intubated  HEENT:  Right conjunctival discharge and congestion   Neuro:  doesnt open eyes to painful sitmuli, anisocoria, not following commands , no painful stimuli was done to assess for strength as he is comfort measures   Cards:  RRR  Respiratory:  no respiratory distress  Adomen:  soft  Extremities:  ischemic changes   Skin:  warm/dry

## 2022-06-08 NOTE — CONSULT NOTE ADULT - PROBLEM SELECTOR RECOMMENDATION 2
Per renal  -Requiring new HD.
5/31 MRI with multiple acute infarcts
s/p 2uPRBC today 5/31  transfuse hb <7  keep type active every 72hours
I Edward Pinto MD performed a history and physical exam of the patient and discussed  the findings and plan with the house officer. I reviewed the resident note and agree with the findings and plan

## 2022-06-08 NOTE — PROGRESS NOTE ADULT - PROBLEM SELECTOR PLAN 5
-S/p EGD 6/1 found to have gastritis, oozing duodenal ulcer s/p clips       -PPI  -S/p embolization in IR.   -H&H still downtrending  -CTA abdomen with contrast extravasation into the distal descending and proximal horizontal segments of the duodenum consistent with active bleeding.  -Pt now comfort care

## 2022-06-08 NOTE — CONSULT NOTE ADULT - PROBLEM SELECTOR RECOMMENDATION 3
Presents with dark urine with hematuria, decreased UO  -CT A/P with no hydronephrosis, nephrolithiasis, or obstruction  -UC negative  -ABX per ID.
I Edward Pinto MD performed a history and physical exam of the patient and discussed  the findings and plan with the house officer. I reviewed the resident note and agree with the findings and plan
Intubated:  management per NSCU for now  Discussion ongoing for compassionate extubation

## 2022-06-08 NOTE — PROGRESS NOTE ADULT - ASSESSMENT
ASSESSMENT/PLAN:  acute ischemic stroke, has occlusion of left ICA and right ICA stenosis   now with hemorrhagic shock     NEURO:  Comfort care   Morphine 2 mg Q2 hrs PRN   Lorazepam 1 mg q2 hrs PRN    PULM:   acute respiratory failure, intubated, ET in good position  vent settings PEE5, FiO2 50, RR 16,      CV:   Afib, CAD     RENAL:  Fluids: IVL  ESRD CVVH was held (Comfort care)  CLEARED FOR CT PER RENAL     GI:  NG   NPO   upper GI bleeding     ENDO:   Finger sticks were held (Comfort care)    HEME/ONC:  VTE prophylaxis: [x] SCDs      ID:   afebrile   HSV Keratitis - opthalmology on consult      CODE STATUS:  [] Full Code [x] DNR [] DNI [] Palliative/Comfort Care    DISPOSITION:  [x] ICU [] Stroke Unit [] Floor [] EMU [] RCU [] PCU    [x] Patient is at high risk of neurologic deterioration/death due to: sepsis due to unknown organism, hypotension, cerebral hypoperfusion, acute stroke, altered mental status, respiratory failure requiring intubation     Contact: [phone]   ASSESSMENT/PLAN:  acute ischemic stroke, has occlusion of left ICA and right ICA stenosis   now with hemorrhagic shock , and worsening neuro exam  family discussion done yesterday , he is now comfort measures  morphine PRN for pain, change to dilaudid PRN    ativan PRN for anxiety   consultaed palliative pain   off pressors   live on consulted   respiratory failure, intubated on Mode: AC/ CMV (Assist Control/ Continuous Mandatory Ventilation), RR (machine): 16, TV (machine): 500, FiO2: 40, PEEP: 5, ITime: 1, MAP: 12, PIP: 22  waiting for family to device when to perform compassionate extubation   NPO  continue to offer family support     patient at risk for cardiac arrest, he is DNR, DNI

## 2022-06-08 NOTE — CONSULT NOTE ADULT - PROVIDER SPECIALTY LIST ADULT
Cardiology
MICU
Neurology
Neurology
Neurosurgery
Intervent Radiology
Ophthalmology
Pulmonology
Vascular Surgery
Vascular Surgery
Gastroenterology
Infectious Disease
Nephrology
Palliative Care
Vascular Surgery
Wound Care
Intervent Radiology
Intervent Radiology

## 2022-06-08 NOTE — PROGRESS NOTE ADULT - NUTRITIONAL ASSESSMENT
This patient has been assessed with a concern for Malnutrition and has been determined to have a diagnosis/diagnoses of Severe protein-calorie malnutrition.    This patient is being managed with:   Diet Consistent Carbohydrate Renal/No Snacks-  Entered: May  5 2022  3:21PM    
This patient has been assessed with a concern for Malnutrition and has been determined to have a diagnosis/diagnoses of Severe protein-calorie malnutrition.    This patient is being managed with:   Diet Consistent Carbohydrate Renal/No Snacks-  Liquid Protein Supplement     Qty per Day:  2  Supplement Feeding Modality:  Oral  Nepro Cans or Servings Per Day:  1       Frequency:  Daily  Entered: May 27 2022  7:58PM    
This patient has been assessed with a concern for Malnutrition and has been determined to have a diagnosis/diagnoses of Severe protein-calorie malnutrition.    This patient is being managed with:   Diet Consistent Carbohydrate Renal/No Snacks-  Supplement Feeding Modality:  Oral  Nepro Cans or Servings Per Day:  1       Frequency:  Daily  Entered: May 13 2022  2:46PM    
This patient has been assessed with a concern for Malnutrition and has been determined to have a diagnosis/diagnoses of Severe protein-calorie malnutrition.    This patient is being managed with:   Diet Consistent Carbohydrate Renal/No Snacks-  Supplement Feeding Modality:  Oral  Nepro Cans or Servings Per Day:  1       Frequency:  Daily  Entered: May 13 2022  2:46PM    
This patient has been assessed with a concern for Malnutrition and has been determined to have a diagnosis/diagnoses of Severe protein-calorie malnutrition.    This patient is being managed with:   Diet NPO-  Except Medications  Entered: Jun 6 2022  6:34AM    
This patient has been assessed with a concern for Malnutrition and has been determined to have a diagnosis/diagnoses of Severe protein-calorie malnutrition.    This patient is being managed with:   Diet NPO-  Except Medications  Entered: May 31 2022  1:36AM    
This patient has been assessed with a concern for Malnutrition and has been determined to have a diagnosis/diagnoses of Severe protein-calorie malnutrition.    This patient is being managed with:   Diet NPO-  Except Medications  Tube Feeding Modality: Nasogastric  Nepro with Carb Steady (NEPRORTH)  Total Volume for 24 Hours (mL): 1200  Continuous  Starting Tube Feed Rate {mL per Hour}: 10  Increase Tube Feed Rate by (mL): 10     Every 4 hours  Until Goal Tube Feed Rate (mL per Hour): 50  Tube Feed Duration (in Hours): 24  Tube Feed Start Time: 11:30  Entered: [place]  3 2022  8:42AM    
This patient has been assessed with a concern for Malnutrition and has been determined to have a diagnosis/diagnoses of Severe protein-calorie malnutrition.    This patient is being managed with:   Diet NPO-  Except Medications  Tube Feeding Modality: Nasogastric  Nepro with Carb Steady (NEPRORTH)  Total Volume for 24 Hours (mL): 1200  Continuous  Starting Tube Feed Rate {mL per Hour}: 10  Increase Tube Feed Rate by (mL): 10     Every 4 hours  Until Goal Tube Feed Rate (mL per Hour): 50  Tube Feed Duration (in Hours): 24  Tube Feed Start Time: 11:30  Entered: [place]  3 2022  8:42AM    
This patient has been assessed with a concern for Malnutrition and has been determined to have a diagnosis/diagnoses of Severe protein-calorie malnutrition.    This patient is being managed with:   Diet Consistent Carbohydrate Renal/No Snacks-  Liquid Protein Supplement     Qty per Day:  2  Supplement Feeding Modality:  Oral  Nepro Cans or Servings Per Day:  1       Frequency:  Daily  Entered: May 27 2022  7:58PM    
This patient has been assessed with a concern for Malnutrition and has been determined to have a diagnosis/diagnoses of Severe protein-calorie malnutrition.    This patient is being managed with:   Diet Consistent Carbohydrate Renal/No Snacks-  Supplement Feeding Modality:  Oral  Nepro Cans or Servings Per Day:  1       Frequency:  Daily  Entered: May 13 2022  2:46PM    
This patient has been assessed with a concern for Malnutrition and has been determined to have a diagnosis/diagnoses of Severe protein-calorie malnutrition.    This patient is being managed with:   Diet NPO-  Except Medications  Entered: Jun 6 2022  6:34AM    
This patient has been assessed with a concern for Malnutrition and has been determined to have a diagnosis/diagnoses of Severe protein-calorie malnutrition.    This patient is being managed with:   Diet NPO-  Except Medications  Entered: Jun 6 2022  6:34AM    
This patient has been assessed with a concern for Malnutrition and has been determined to have a diagnosis/diagnoses of Severe protein-calorie malnutrition.    This patient is being managed with:   Diet NPO-  Except Medications  Entered: May 31 2022  1:36AM    
This patient has been assessed with a concern for Malnutrition and has been determined to have a diagnosis/diagnoses of Severe protein-calorie malnutrition.    This patient is being managed with:   Diet NPO-  Except Medications  Entered: May 31 2022  1:36AM    
This patient has been assessed with a concern for Malnutrition and has been determined to have a diagnosis/diagnoses of Severe protein-calorie malnutrition.    This patient is being managed with:   Diet NPO-  Except Medications  Tube Feeding Modality: Nasogastric  Nepro with Carb Steady (NEPRORTH)  Total Volume for 24 Hours (mL): 720  Continuous  Starting Tube Feed Rate {mL per Hour}: 10  Increase Tube Feed Rate by (mL): 10     Every 4 hours  Until Goal Tube Feed Rate (mL per Hour): 30  Tube Feed Duration (in Hours): 24  Tube Feed Start Time: 11:30  Entered: Jun 2 2022 10:51AM    
This patient has been assessed with a concern for Malnutrition and has been determined to have a diagnosis/diagnoses of Severe protein-calorie malnutrition.    This patient is being managed with:   Diet NPO-  NPO for Procedure/Test     NPO Start Date: 26-May-2022   NPO Start Time: 23:59  Entered: May 25 2022  6:21PM    Diet Consistent Carbohydrate Renal/No Snacks-  Supplement Feeding Modality:  Oral  Nepro Cans or Servings Per Day:  1       Frequency:  Daily  Entered: May 13 2022  2:46PM    
This patient has been assessed with a concern for Malnutrition and has been determined to have a diagnosis/diagnoses of Severe protein-calorie malnutrition.    This patient is being managed with:   Diet Consistent Carbohydrate Renal/No Snacks-  Entered: May  5 2022  3:21PM    
This patient has been assessed with a concern for Malnutrition and has been determined to have a diagnosis/diagnoses of Severe protein-calorie malnutrition.    This patient is being managed with:   Diet Consistent Carbohydrate Renal/No Snacks-  Supplement Feeding Modality:  Oral  Nepro Cans or Servings Per Day:  1       Frequency:  Daily  Entered: May 13 2022  2:46PM    
This patient has been assessed with a concern for Malnutrition and has been determined to have a diagnosis/diagnoses of Severe protein-calorie malnutrition.    This patient is being managed with:   Diet NPO-  Except Medications  Entered: May 31 2022  1:36AM    
This patient has been assessed with a concern for Malnutrition and has been determined to have a diagnosis/diagnoses of Severe protein-calorie malnutrition.    This patient is being managed with:   Diet NPO-  Except Medications  Tube Feeding Modality: Nasogastric  Nepro with Carb Steady (NEPRORTH)  Total Volume for 24 Hours (mL): 1200  Continuous  Starting Tube Feed Rate {mL per Hour}: 10  Increase Tube Feed Rate by (mL): 10     Every 4 hours  Until Goal Tube Feed Rate (mL per Hour): 50  Tube Feed Duration (in Hours): 24  Tube Feed Start Time: 11:30  Entered: [place]  3 2022  8:42AM    
This patient has been assessed with a concern for Malnutrition and has been determined to have a diagnosis/diagnoses of Severe protein-calorie malnutrition.    This patient is being managed with:   Diet NPO-  Except Medications  Entered: May 31 2022  1:36AM    
This patient has been assessed with a concern for Malnutrition and has been determined to have a diagnosis/diagnoses of Severe protein-calorie malnutrition.    This patient is being managed with:   Diet NPO-  Except Medications  Tube Feeding Modality: Nasogastric  Nepro with Carb Steady (NEPRORTH)  Total Volume for 24 Hours (mL): 720  Continuous  Starting Tube Feed Rate {mL per Hour}: 10  Increase Tube Feed Rate by (mL): 10     Every 4 hours  Until Goal Tube Feed Rate (mL per Hour): 30  Tube Feed Duration (in Hours): 24  Tube Feed Start Time: 11:30  Entered: Jun 2 2022 10:51AM    
This patient has been assessed with a concern for Malnutrition and has been determined to have a diagnosis/diagnoses of Severe protein-calorie malnutrition.    This patient is being managed with:   Diet Consistent Carbohydrate Renal/No Snacks-  Entered: May  5 2022  3:21PM    
This patient has been assessed with a concern for Malnutrition and has been determined to have a diagnosis/diagnoses of Severe protein-calorie malnutrition.    This patient is being managed with:   Diet Consistent Carbohydrate Renal/No Snacks-  Liquid Protein Supplement     Qty per Day:  2  Supplement Feeding Modality:  Oral  Nepro Cans or Servings Per Day:  1       Frequency:  Daily  Entered: May 27 2022  7:58PM    
This patient has been assessed with a concern for Malnutrition and has been determined to have a diagnosis/diagnoses of Severe protein-calorie malnutrition.    This patient is being managed with:   Diet Consistent Carbohydrate Renal/No Snacks-  Liquid Protein Supplement     Qty per Day:  2  Supplement Feeding Modality:  Oral  Nepro Cans or Servings Per Day:  1       Frequency:  Daily  Entered: May 27 2022  7:58PM    
This patient has been assessed with a concern for Malnutrition and has been determined to have a diagnosis/diagnoses of Severe protein-calorie malnutrition.    This patient is being managed with:   Diet Consistent Carbohydrate Renal/No Snacks-  Supplement Feeding Modality:  Oral  Nepro Cans or Servings Per Day:  1       Frequency:  Daily  Entered: May 13 2022  2:46PM    
This patient has been assessed with a concern for Malnutrition and has been determined to have a diagnosis/diagnoses of Severe protein-calorie malnutrition.    This patient is being managed with:   Diet NPO-  Except Medications  Entered: Jun 6 2022  6:34AM    
This patient has been assessed with a concern for Malnutrition and has been determined to have a diagnosis/diagnoses of Severe protein-calorie malnutrition.    This patient is being managed with:   Diet NPO-  Except Medications  Entered: May 31 2022  1:36AM    
This patient has been assessed with a concern for Malnutrition and has been determined to have a diagnosis/diagnoses of Severe protein-calorie malnutrition.    This patient is being managed with:   Diet NPO-  Except Medications  Entered: May 31 2022  1:36AM    
This patient has been assessed with a concern for Malnutrition and has been determined to have a diagnosis/diagnoses of Severe protein-calorie malnutrition.    This patient is being managed with:   Diet NPO-  Except Medications  Entered: Jun 6 2022  6:34AM    
This patient has been assessed with a concern for Malnutrition and has been determined to have a diagnosis/diagnoses of Severe protein-calorie malnutrition.    This patient is being managed with:   Diet NPO-  Except Medications  Entered: May 31 2022  1:36AM    
This patient has been assessed with a concern for Malnutrition and has been determined to have a diagnosis/diagnoses of Severe protein-calorie malnutrition.    This patient is being managed with:   Diet NPO-  Except Medications  Tube Feeding Modality: Nasogastric  Nepro with Carb Steady (NEPRORTH)  Total Volume for 24 Hours (mL): 1200  Continuous  Starting Tube Feed Rate {mL per Hour}: 10  Increase Tube Feed Rate by (mL): 10     Every 4 hours  Until Goal Tube Feed Rate (mL per Hour): 50  Tube Feed Duration (in Hours): 24  Tube Feed Start Time: 11:30  Entered: [place]  3 2022  8:42AM    
This patient has been assessed with a concern for Malnutrition and has been determined to have a diagnosis/diagnoses of Severe protein-calorie malnutrition.    This patient is being managed with:   Diet NPO-     Special Instructions for Nursing:  CODE STROKE; NPO until Dysphagia screen or Speech Bedside Swallow Evaluation completed and passed  Entered: May 30 2022 10:39PM

## 2022-06-08 NOTE — CONSULT NOTE ADULT - ASSESSMENT
67M w/ prolonged hospital course renal failure req dialysis c/b b/l hypoperfusions strokes from R ICA stenosis 70-80% and L ICA occlusion (prior CEA) planned for BOOKER stent but course c/b acute uremia , GIB (melena) , and additional hypoperfusion episode and xfer to nscu. Palliative called for goals of care and advance care planning

## 2022-06-08 NOTE — PROGRESS NOTE ADULT - PROBLEM SELECTOR PLAN 1
intubated for embolization in IR 6/6  -H&H downtrending  -Pts family opted for comfort care. F/u palliative recs.

## 2022-06-08 NOTE — CONSULT NOTE ADULT - PROBLEM SELECTOR RECOMMENDATION 9
monitor BM  pt added to endoscopy schedule for 6/1 for EGD  Covid needs to be <72hours old  type and screen current  NPO after midnight  c/w IVF  cards and renal cleared  HD today
Non obstructive bleedin duodenal ulcer with active bleed , s/p endoclip  Re bleed 6/5 with suboptimal Hgb response to transfusion, s/p IR angio celiac and SMA without contrast extravassation s/p empiric GDA embolization  Recurrent/persistent GI bleeding 6/6 6/7  CTA active contrast extravasation in duodenum  Family opted for symptom mediated care, no further aggressive measures  Awaits PCU
Suspect 2nd to fluid overload given elevated proBNP, LE edema  -Endorses improvement of symptoms since admission s/p HD   -Keep O>I with HD as tolerated   -Pt with hx of hydroPTX. CT chest in 2/2022 with pleural thickening, atelectasis. Findings at the time suspected to be chronic. CT A/P 4/2 with small b/l pl effusions L>R, pleural thickening  -Check CT chest  -Keep sats >90% with supplemental O2 as needed (currently 2LNC).
I Edward Pinto MD performed a history and physical exam of the patient and discussed  the findings and plan with the house officer. I reviewed the resident note and agree with the findings and plan

## 2022-06-08 NOTE — PROGRESS NOTE ADULT - ASSESSMENT
67M PMH of CAD, NSTEMI s/p 3 stents in 2014 (stents to LAD, proximal and distal LCx), ischemic cardiomyopathy, HTN for 10 years, T2DM for 26 years c/b peripheral neuropathy, CVA, TIA, Afib on Pradaxa, chronic RLE wound, L carotid stenosis s/p endarterectomy (2014) just recently admitted  to the [facility] on 2/19/2022 with acute onset chest pain for one day found to have an NSTEMI, CAD, s/p PCI in setting of increased AF rates off bb for 3 days and resolved chest pain, his CKMB peaked and Echo noted with EF 60%,normal LV function, mild TR, mild DE. He was s/p [facility] with 85% proximal LAD s/p balloon angioplasty and LULU. He presented to [facility] ED with decreased urine output over the week. Carotid duplex showed Calcified atherosclerotic disease seen throughout the carotid systems. There is new occlusion of the left internal carotid artery. There is greater than 70% stenosis involving the distal right common carotid artery as was seen previously. Mild to moderate flow-limiting stenosis is seen at the left external carotid artery. Vascular surgery consulted for carotid stenosis. Pt currently denies vision changes, dizziness, lightheadedness or any other symptoms at this point in time.     PLAN:  pt is currently being palliated  d/w daughter above   reconsult prn

## 2022-06-08 NOTE — PROGRESS NOTE ADULT - PROBLEM SELECTOR PLAN 8
Presents with dark urine with hematuria, decreased UO  -CT A/P with no hydronephrosis, nephrolithiasis, or obstruction  -UC negative  -S/p ABX per ID.
Presents with dark urine with hematuria, decreased UO  -CT A/P with no hydronephrosis, nephrolithiasis, or obstruction  -UC negative  -S/p ABX per ID.

## 2022-06-08 NOTE — CONSULT NOTE ADULT - CONSULT REQUESTED BY NAME
Dr. Corral
dr bae
Dr. Mazariegos
PICU
RN
Base
[facility]
Dr. Donaldson [employer] for Dr Logan Cordova
Jarek DAVIS [facility]
Dr. Cordova
ED
Dr. Corral
Medicine
Medicine
NSICU
[place]
nscu
Dr. Logan Cordova

## 2022-06-08 NOTE — PROGRESS NOTE ADULT - SUBJECTIVE AND OBJECTIVE BOX
DATE OF SERVICE: 06-08-22 @ 10:25  CHIEF COMPLAINT:Patient is a 67y old  Male who presents with a chief complaint of Decreased urine output for the past week, leg oedema (08 Jun 2022 08:54)    	        PAST MEDICAL & SURGICAL HISTORY:  HTN (hypertension), benign      HLD (hyperlipidemia)      DM type 2 (diabetes mellitus, type 2)      TIA (transient ischemic attack)      Atrial fibrillation      MI (myocardial infarction)  circa 2014 and 2022.      CAD (coronary artery disease)      Neuropathy      Stage 3 chronic kidney disease      2019 novel coronavirus disease (COVID-19)  12/2021.  Received the COVID-19 vaccine x 2 as of April 2022.      Status post angioplasty with stent  LULU x 3 2/7/2014      S/P carotid endarterectomy  left              no response to verbal or tactile stimuli    Medications:  MEDICATIONS  (STANDING):  chlorhexidine 0.12% Liquid 15 milliLiter(s) Oral Mucosa every 12 hours  erythromycin   Ointment 1 Application(s) Right EYE four times a day  lidocaine   4% Patch 1 Patch Transdermal daily  pantoprazole  Injectable 40 milliGRAM(s) IV Push daily  petrolatum Ophthalmic Ointment 1 Application(s) Left EYE four times a day  trifluridine 1% Solution 1 Drop(s) Right EYE <User Schedule>    MEDICATIONS  (PRN):  HYDROmorphone  Injectable 1 milliGRAM(s) IV Push every 2 hours PRN Vent Sync  LORazepam   Injectable 1 milliGRAM(s) IV Push every 2 hours PRN Anxiety    	    PHYSICAL EXAM:  T(C): 36.7 (06-08-22 @ 07:00), Max: 37.2 (06-07-22 @ 15:00)  HR: 122 (06-08-22 @ 08:55) (82 - 140)  BP: --  RR: 20 (06-08-22 @ 07:00) (14 - 35)  SpO2: 100% (06-08-22 @ 08:55) (81% - 100%)  Wt(kg): --  I&O's Summary    07 Jun 2022 07:01  -  08 Jun 2022 07:00  --------------------------------------------------------  IN: 2579.3 mL / OUT: 0 mL / NET: 2579.3 mL          Cardiovascular: Normal S1 S2,   Respiratory: Lungs clear to auscultation	    Gastrointestinal:  Soft,     TELEMETRY: 	    ECG:  	  RADIOLOGY:  OTHER: 	  	  LABS:	 	    CARDIAC MARKERS:                                7.8    24.02 )-----------( 228      ( 07 Jun 2022 13:21 )             23.5     06-06    139  |  102  |  72<H>  ----------------------------<  232<H>  4.5   |  24  |  5.15<H>    Ca    8.6      06 Jun 2022 20:11  Phos  4.5     06-07  Mg     2.2     06-06      proBNP:   Lipid Profile:   HgA1c:   TSH:

## 2022-06-08 NOTE — PROGRESS NOTE ADULT - SUBJECTIVE AND OBJECTIVE BOX
SUBJECTIVE:   pt had RRT on 6/4 with decline of mental status  he has not had improvement since that time  transfused overnite 6/5  in NSICU now.  Melena overnight.  Intubated but not sedated         NEUROLOGICAL EXAM:    Mental status: not opens eyes to voice, not responding, not following commands, not naming or repeating.  never look at examiner    Cranial Nerves: Pupils were equal, right pupil appears surgical, both pupils are reactive to light. Extraocular movements were intact. B BTT.  Due to mental status unable to assess facial sensation roving eye movements    Motor exam: Bulk and tone were normal. extensor posturing in the left arm    Reflexes: DTRs depressed, flexor equivocal    Sensation: Intact to noxious    Coordination: no gross dysmetria    Gait: deferred    NIHSS: 19    Imaging:    ACC: [id number] EXAM:  CT BRAIN                        PROCEDURE DATE:  04/09/2022      INTERPRETATION:  CLINICAL INFORMATION:  Altered mental status, on   anticoagulation .    TECHNIQUE: Multiple contiguous axial images were acquired from the   skullbase to the vertex without the administration of intravenous   contrast.  Coronal and sagittal reformations were made.    COMPARISON: CT head 10/21/2021, brain MRI 02/23/2014.    FINDINGS:    Scattered lacunar infarcts in the bilateral cerebralhemispheres are   grossly stable compared to the 10/21/2021 head CT. No new additional   infarcts are noted over the time interval.    No acute intracranial hemorrhage, mass effect, or midline shift. The   brain demonstrates periventricular hypoattenuation, nonspecific in   etiology but likely representing chronic microvascular ischemic changes.    No abnormal extra-axial fluid collections are present.    The ventricles and sulci demonstrate age appropriate involutional   changes.  The basal cisterns are patent.    The orbits are unremarkable. The paranasal sinuses are clear. The mastoid   air cells are clear. The calvarium is intact.    IMPRESSION:    No acute intracranial abnormality is noted. If the patient has new and   persistent symptoms, consider short interval follow-up head CT or brain   MRI.    --- End of Report ---    GATITO VILLARREAL MD; Resident Radiologist  This document has been electronically signed.  JESSE CORONA MD; Attending Radiologist  This document has been electronically signed. Apr 9 2022  2:00PM        ACC: [id number] EXAM:  MR BRAIN                          PROCEDURE DATE:  05/11/2022          INTERPRETATION:  MR brain  without gadolinium    CLINICAL INFORMATION: Stroke.    TECHNIQUE: Limited Sagittal T1-weighted images, axial FLAIR images, and   axial diffusion weighted images of the brain were obtained.  Patient was   unable to hold still for remaining sequences.    FINDINGS:   MRI dated 02/23/2014 available for review.    The brain demonstrates small acute infarctions in the BILATERAL posterior   centrum semiovale and LEFT corona radiata and LEFT posterior   periventricular white matter with restricted diffusion. No intracranial   hemorrhage is recognized. No mass effect is found in the brain.    The ventricles, sulci and basal cisterns show mild global atrophy most   prominent within the BILATERAL cerebellum.    The vertebral and internal carotid arteries demonstrate expected flow   voids indicating their patency.    The orbits are unremarkable.  The paranasal sinuses are clear.  The nasal   cavity appears intact.  The nasopharynx is symmetric.  The central skull   base and temporal bones are intact.  The calvarium appears unremarkable.    IMPRESSION: Small acute infarctions in the BILATERAL posterior centrum   semiovale and LEFT corona radiata and LEFT posterior periventricular   white matter with restricted diffusion.   mild global atrophy most   prominent within the BILATERAL cerebellum.    --- End of Report ---            JESÚS PADGETT MD; Attending Radiologist  This document has been electronically signed. May 11 2022  5:47PM          < from: CT Angio Head w/ IV Cont (05.12.22 @ 13:34) >    ACC: [id number] EXAM:  CT ANGIO NECK (W)AW IC                        ACC: [id number] EXAM:  CT ANGIO BRAIN (W)AW IC                          PROCEDURE DATE:  05/12/2022          INTERPRETATION:  CT angiography of the brain and neck    CLINICAL INDICATION: Recent infarcts. Carotid stenosis.    TECHNIQUE: Direct axial CT scanning of the brain and neck was obtained   from the vertex to the level of the clavicular heads after the dynamic   intravenous injection of 44 cc of Omnipaque 300. 0 cc were discarded.   Sagittal and coronal maximum intensity projection reformats were   provided.  Three-dimensional reconstructions were performed by the   radiologist using the [employer] workstation.    Additionally, noncontrast axial CT scanning of the brain was obtained   from the skull base to the vertex. Sagittal and coronal reformats were   provided.    COMPARISON: MRA neck 10/24/2021    FINDINGS:    CT brain:    Previously seen acute infarcts in the bilateral cerebral hemispheres are   redemonstrated. No CT evidence for hemorrhagic transformation.    No hydrocephalus, midline shift, or effacement of basal cisterns.    No acute intracranial hemorrhage or vasogenic edema.    Mild to moderate white matter microvascular ischemic disease.    CTA brain:    Limited by relatively decreased opacification of the arteries.    Multifocal narrowing of the right skull base ICA due to calcified plaque,   the degree of which is not well evaluated.    Normal flow-related enhancement of the supraclinoid right ICA.    Lack of flow related enhancement of the petrous, precavernous, and   cavernous left ICA. Reconstitution of the vessel at its supraclinoid   segment.    Otherwise no flow-limiting stenosis.    Normal flow-related enhancement of the bilateral anterior, middle, and   posterior cerebral arteries.    Normal flow-related enhancement of the intradural vertebral arteries and   basilar artery.    No vascular aneurysm or AVM.    CTA neck:    Stenoses described in this report are on the basis of NASCET criteria.    Study is significantly limited by relatively decreased opacification of   the arteries.    Short segment stenosis of the mid left common carotid artery due to the   presence of partially calcified plaque is poorly evaluated. Flow-related   enhancement of the more proximal and distal left common carotid artery is   noted.    Long segment stenosis of the mid and distal right common carotid artery   due to the presence of partially calcified plaque is poorly evaluated.    Rapidly progressive stenosis of the left internal carotid artery   beginning at its origin. No flow related enhancement of the vessel beyond   its origin.    Normal flow-related enhancement of the bilateral vertebral arteries.    No gross evidence for acute arterial dissection.    IMPRESSION:    CTA brain:  Limited by relatively decreased opacification of the arteries.    Multifocal narrowing of the right skull base ICA due to calcified plaque,   the degree of which is not well evaluated.    Lack of flow related enhancement of the petrous, precavernous, and   cavernous left ICA. Reconstitution of the vessel at its supraclinoid   segment.    Otherwise no flow-limiting stenosis.    No vascular aneurysm or AVM.    CTA neck:  Limited by relatively decreased opacification of the arteries.    Short segment stenosis of the mid left common carotid artery due to the   presence of partially calcified plaque is poorly evaluated. Flow-related   enhancement of the more proximal and distal left common carotid artery is   noted.    Long segment stenosis of the mid and distal right common carotid artery   due to the presence of partially calcified plaque is poorly evaluated.    Rapidly progressive stenosis of the left internal carotid artery   beginning at its origin. No flow related enhancement of the vessel beyond   its origin.    Recommend correlation with contrast-enhanced MRI of the neck for further   evaluation.    --- End of Report ---            MIGUEL ANGEL CARRILLO MD; Attending Radiologist  This document has been electronically signed. May 12 2022  2:47PM    < end of copied text >      < from: MR Head No Cont (05.31.22 @ 08:42) >    ACC: [id number] EXAM:  MR BRAIN                          PROCEDURE DATE:  05/31/2022          INTERPRETATION:  Clinical history: Code stroke.    Limited MRI was performed using axial diffusion and susceptibility   weighted sequence as well as axial T2-weighted sequence. This exam is   compared with prior brain MRI performed on May 11, 2022.    Scattered areas of abnormal T2 prolongation with restricted diffusion is   seen involving the bilateral corona radiata and centrum semiovale region   as well as the left periatrial region. These findings compatible with   areas of acute infarcts. These findings could be secondary to systemic   etiology. Please correlate clinically.    Parenchymal volume loss and chronic microvascular ischemic changes are   identified    Multiple inflammatory changes seen involving the right mastoid.    The visualized paranasal sinuses mastoid and middle ear regions appear   clear.    IMPRESSION: Acute infarcts as described above.    --- End of Report ---            TOMASA BUTLER MD; Attending Radiologist  This document has been electronically signed. May 31 2022  9:06AM    < end of copied text >

## 2022-06-08 NOTE — CONSULT NOTE ADULT - REASON FOR ADMISSION
ANT
Decreased urine output for the past week, leg oedema
ARF
Decreased urine output for the past week, leg edema
Decreased urine output for the past week, leg oedema
Decreased urine output for the past week, leg edema

## 2022-06-08 NOTE — PROGRESS NOTE ADULT - PROBLEM SELECTOR PROBLEM 7
Cystitis
CAD (coronary artery disease)
Carotid stenosis
Cystitis
Carotid stenosis
Cystitis
Carotid stenosis
Carotid stenosis
CAD (coronary artery disease)
Cystitis
Carotid stenosis

## 2022-06-08 NOTE — PROGRESS NOTE ADULT - SUBJECTIVE AND OBJECTIVE BOX
CARDIOLOGY FOLLOW UP - Dr. Mazariegos  Date of Service: 6/8/22  CC: events noted, now comfort care, family at bedside    Review of Systems:  Constitutional: No fever, weight loss, or fatigue  Respiratory: No cough, wheezing, or hemoptysis, no shortness of breath  Cardiovascular: No chest pain, palpitations, passing out, dizziness, or leg swelling  Gastrointestinal: No abd or epigastric pain. No nausea, vomiting, or hematemesis; no diarrhea or consiptaiton, no melena or hematochezia  Vascular: No edema     TELEMETRY:    PHYSICAL EXAM:  T(C): 36.7 (06-08-22 @ 07:00), Max: 37.2 (06-07-22 @ 15:00)  HR: 122 (06-08-22 @ 08:55) (107 - 140)  BP: --  RR: 20 (06-08-22 @ 07:00) (14 - 35)  SpO2: 100% (06-08-22 @ 08:55) (81% - 100%)  Wt(kg): --  I&O's Summary    07 Jun 2022 07:01  -  08 Jun 2022 07:00  --------------------------------------------------------  IN: 2579.3 mL / OUT: 0 mL / NET: 2579.3 mL        Appearance: Normal	  Cardiovascular: Normal S1 S2,RRR, No JVD, No murmurs  Respiratory: Lungs clear to auscultation	  Gastrointestinal:  Soft, Non-tender, + BS	  Extremities: Normal range of motion, No clubbing, cyanosis or edema  Vascular: Peripheral pulses palpable 2+ bilaterally       Home Medications:  allopurinol 100 mg oral tablet: 1 tab(s) orally once a day (03 Apr 2022 06:34)  aspirin 81 mg oral delayed release tablet: 1 tab(s) orally once a day (03 Apr 2022 06:34)  bumetanide 2 mg oral tablet: 1 tab(s) orally once a day (03 Apr 2022 06:34)  insulin glargine 100 units/mL subcutaneous solution: 25 unit(s) subcutaneous once a day (at bedtime) (03 Apr 2022 06:34)  metOLazone 5 mg oral tablet: 1 tab(s) orally 3 times a week (03 Apr 2022 06:34)  metoprolol succinate 50 mg oral tablet, extended release: 2 tab(s) orally once a day (03 Apr 2022 06:34)  NovoLOG 100 units/mL subcutaneous solution: subcutaneous 4 times a day (before meals and at bedtime) (03 Apr 2022 06:34)  NovoLOG FlexPen 100 units/mL injectable solution: 10 unit(s) subcutaneous 3 times a day (03 Apr 2022 06:34)  oxycodone-acetaminophen 5 mg-325 mg oral tablet: 1 tab(s) orally 2 times a day (03 Apr 2022 06:34)  Pradaxa 150 mg oral capsule: 1 cap(s) orally 2 times a day (03 Apr 2022 06:34)  pregabalin 100 mg oral capsule: 1 cap(s) orally 3 times a day (03 Apr 2022 06:34)        MEDICATIONS  (STANDING):  chlorhexidine 0.12% Liquid 15 milliLiter(s) Oral Mucosa every 12 hours  chlorhexidine 2% Cloths 1 Application(s) Topical <User Schedule>  erythromycin   Ointment 1 Application(s) Right EYE four times a day  lidocaine   4% Patch 1 Patch Transdermal daily  pantoprazole  Injectable 40 milliGRAM(s) IV Push daily  petrolatum Ophthalmic Ointment 1 Application(s) Left EYE four times a day        EKG:  RADIOLOGY:  DIAGNOSTIC TESTING:  [ ] Echocardiogram:  [ ] Catherterization:  [ ] Stress Test:  OTHER:     LABS:	 	                          7.8    24.02 )-----------( 228      ( 07 Jun 2022 13:21 )             23.5     06-06    139  |  102  |  72<H>  ----------------------------<  232<H>  4.5   |  24  |  5.15<H>    Ca    8.6      06 Jun 2022 20:11  Phos  4.5     06-07  Mg     2.2     06-06        PT/INR - ( 06 Jun 2022 21:21 )   PT: 19.7 sec;   INR: 1.70 ratio         PTT - ( 07 Jun 2022 10:59 )  PTT:28.5 sec    CARDIAC MARKERS:

## 2022-06-08 NOTE — PROGRESS NOTE ADULT - PROBLEM SELECTOR PLAN 6
W/ hx of multiple stents  -Per cards.
-Carotid duplex with greater than 70% stenosis involving the distal R common carotid artery  -F/u CTA head and neck  -Vascular f/u
W/ hx of multiple stents  -Per cards.
W/ hx of multiple stents  -Per cards.
-ABG with no CO2 retention, likely 2nd to [place]?  -Mental status now overall improved but still with intermittent confusion  -Neuro recs noted, concern for MOR. Pt with no CO2 retention on ABGs, no episodes of desaturation while sleeping. Can pursue outpatient PSG.  -MR head with multiple small acute infarct
-ABG with no CO2 retention, initially likely 2nd to [place]?  -Mental status now overall improved but still with intermittent confusion. Poor PO intake, f/u calorie count.   -MR head with multiple small acute infarcts  -Neuro f/u
-ABG with no CO2 retention, likely 2nd to [place]?  -Mental status now overall improved but still with intermittent confusion  -MR head with multiple small acute infarcts  -Neuro f/u
-Carotid duplex with greater than 70% stenosis involving the distal R common carotid artery  -MR head with multiple small acute infarct   -Vacular recs noted, plans for possibly eventual CEA
-ABG with no CO2 retention, initially likely 2nd to [place]?  -Mental status now overall improved but still with intermittent confusion.  -MR head with multiple small acute infarcts  -Neuro f/u
-Carotid duplex with greater than 70% stenosis involving the distal R common carotid artery  -MR head with multiple small acute infarct
-ABG with no CO2 retention, initially likely 2nd to [place]?  -Mental status now overall improved but still with intermittent confusion. Poor PO intake, f/u calorie count.   -MR head with multiple small acute infarcts  -Neuro f/u
W/ hx of multiple stents  -Per cards.
-ABG with no CO2 retention, initially likely 2nd to [place]?  -Mental status now overall improved but still with intermittent confusion. Poor PO intake, f/u calorie count.   -MR head with multiple small acute infarcts  -Neuro f/u
-ABG with no CO2 retention, likely 2nd to [place]?  -Mental status now overall improved but still with intermittent confusion  -Neuro recs noted, concern for MOR. Pt with no CO2 retention on ABGs, no episodes of desaturation while sleeping. Can pursue outpatient PSG.  -MR head with multiple small acute infarct
-ABG with no CO2 retention, likely 2nd to [place]?  -Mental status now overall improved but still with intermittent confusion  -Neuro recs noted, concern for MOR. Pt with no CO2 retention on ABGs, no episodes of desaturation while sleeping. Can pursue outpatient PSG.  -MR head with multiple small acute infarct
-ABG with no CO2 retention, initially likely 2nd to [place]?  -Mental status now overall improved but still with intermittent confusion. Poor PO intake, f/u calorie count.   -MR head with multiple small acute infarcts  -Neuro f/u
-ABG with no CO2 retention, initially likely 2nd to [place]?  -Mental status now overall improved but still with intermittent confusion. Poor PO intake, f/u calorie count.   -MR head with multiple small acute infarcts  -Neuro f/u
-ABG with no CO2 retention, initially likely 2nd to [place]?  -Mental status now overall improved but still with intermittent confusion  -MR head with multiple small acute infarcts  -Neuro f/u

## 2022-06-08 NOTE — PROGRESS NOTE ADULT - THIS PATIENT HAS THE FOLLOWING CONDITION(S)/DIAGNOSES ON THIS ADMISSION:
GI Bleed, afib with RVR
Renal Disease
Renal Disease
None
None
Encephalopathy/Functional Quadriplegia
None
Shock
TIA/Encephalopathy
None
Renal Disease/Encephalopathy/Functional Quadriplegia
Acute Respiratory Failure
Encephalopathy
Encephalopathy/Acute Blood Loss Anemia
GI Bleed, afib with RVR
Acute Respiratory Failure
None
None
TIA/Encephalopathy
Encephalopathy
Encephalopathy
Renal Disease/Encephalopathy
Encephalopathy
Encephalopathy
Acute Respiratory Failure
Heart Failure/Renal Disease/Encephalopathy

## 2022-06-08 NOTE — CONSULT NOTE ADULT - CONVERSATION DETAILS
Face to face meeting with wife Cici and daughter Lucia lasting greater than 20 minutes.   Family has been taking care of patient chronic illnesses for over ten years.  They all share they would do it another ten years if they thought their father had some level of quality of life.  Patient has had an extensive hospitalization with continued complications, most recently  acute infarcts and now GI bleed.   Based on patient and family known wishes a symptom mediated approach is most consistent with patient and family goals.  Appropriately tearful, wife shares with this writer  a "beautiful life" , 45 years of marriage and two children. All in agreement with  transfer to PCU when bed available and continued discussions for compassionate extubation.   They await arrival of patients son from [place], expected to arrive this evening.

## 2022-06-08 NOTE — CONSULT NOTE ADULT - CONSULT REQUESTED DATE/TIME
12-Apr-2022 19:26
02-Apr-2022 22:47
06-Apr-2022 11:40
08-Jun-2022 15:00
11-May-2022 12:53
14-Apr-2022 13:34
31-May-2022
03-Apr-2022 13:47
03-Apr-2022 16:27
03-Apr-2022 12:04
04-Apr-2022 23:00
05-Jun-2022
23-May-2022 13:46
31-May-2022 13:02
03-Jun-2022 00:25
03-Jun-2022 09:24
10-Apr-2022 12:04
04-Apr-2022 13:08

## 2022-06-08 NOTE — PROGRESS NOTE ADULT - ASSESSMENT
67M w/ prolonged hospital course on medicine service iso renal failure req dialysis c/b b/l hypoperfusions strokes from R ICA stenosis 70-80% and L ICA occlusion (prior CEA) planned for BOOKER stent but course c/b acute uremia , GIB (melena) , and additional hypoperfusion episode and xfer to nscu.    episodes of melena 5/30-5/31  off PLavix since 5/31, IV PPI BID started 5/31; remains on ASA and Apixaban (AF) in setting of new strokes and L ICA occlusion    #acute GI blood loss anemia   s/p EGD 6/1/22: Grade C esophagitis, non bleeding erosive gastropathy, duodenitis, Non obstructing bleeding duodenal ulcer (2nd portion, 10mm in largest diameter) with active bleeding - endoclip x4 placed with control of bleeding, additional 6mm non bleeding clean based duodenal ulcer (2nd portion)  High risk for recurrent GI bleeding on triple therapy (DAPT + AC)  re-bleeding 6/5/22 with suboptimal Hgb response to transfusions - IR consulted  s/p IR angio celiac and SMA without contrast extravasation; s/p empiric GDA embolization  recurrent/persistent GI bleeding 6/6Pm-6/7 6/7 +CTA - active contrast extravasation in duodenum - family not wishing to pursue further aggressive measures    now DNR  comfort measures per family wishes    Will Sign off care. Please recall prn for GI concerns.  Thank You.    Discussed with [facility] team  Discussed with family    Nicolas Coleman PA-C    [facility]  [phone]  After hours and weekend coverage [phone]

## 2022-06-08 NOTE — PROGRESS NOTE ADULT - PROBLEM SELECTOR PLAN 2
Likely 2nd to b/l pl effusions, atelectasis  -Suspect fluid overload given elevated proBNP, LE edema on admission   -Keep O>I with HD as tolerated   -Pt with hx of hydroPTX. CT chest in 2/2022 with pleural thickening, atelectasis. Findings at the time suspected to be chronic. CT A/P 4/2 with small b/l pl effusions L>R, pleural thickening  -CT chest 4/4 with small b/l pl effusion R>L, bibasilar atelectasis  -Appeared mildly labored last week. CT chest 5/27 with no PNA, but with increase in L pl effusion. No longer with labored breathing, no plans for thoracentesis.

## 2022-06-08 NOTE — PROGRESS NOTE ADULT - SUBJECTIVE AND OBJECTIVE BOX
INTERVAL HPI/OVERNIGHT EVENTS:  now on comfort measures following family decision yesterday evening  family at bedside    DNR in effect    MEDICATIONS  (STANDING):  chlorhexidine 0.12% Liquid 15 milliLiter(s) Oral Mucosa every 12 hours  chlorhexidine 2% Cloths 1 Application(s) Topical <User Schedule>  erythromycin   Ointment 1 Application(s) Right EYE four times a day  lidocaine   4% Patch 1 Patch Transdermal daily  pantoprazole  Injectable 40 milliGRAM(s) IV Push daily  petrolatum Ophthalmic Ointment 1 Application(s) Left EYE four times a day    MEDICATIONS  (PRN):  HYDROmorphone  Injectable 1 milliGRAM(s) IV Push every 2 hours PRN Vent Sync  LORazepam   Injectable 1 milliGRAM(s) IV Push every 2 hours PRN Anxiety      Allergies  nitrofurantoin (Nephrotoxicity)      Review of Systems:  unable to obtain    Vital Signs Last 24 Hrs  T(C): 36.7 (08 Jun 2022 07:00), Max: 37.2 (07 Jun 2022 15:00)  T(F): 98.1 (08 Jun 2022 07:00), Max: 99 (07 Jun 2022 15:00)  HR: 122 (08 Jun 2022 08:55) (107 - 140)  BP: --  BP(mean): --  RR: 20 (08 Jun 2022 07:00) (14 - 35)  SpO2: 100% (08 Jun 2022 08:55) (81% - 100%)    PHYSICAL EXAM:  General:  calm, intubated; appears comfortable. family a tbedside  HEENT:  Right conjunctival discharge and congestion   Neuro:  doesnt open eyes, pupils unequal not following commands , no painful stimuli was done to assess for strength as he is comfort measures   Cards:  RRR  Respiratory:  no respiratory distress +vent sounds  [place]dom[place]:  soft  Extremities:  ischemic changes   Skin:  warm/dry      LABS:                        7.8    24.02 )-----------( 228      ( 07 Jun 2022 13:21 )             23.5     06-06    139  |  102  |  72<H>  ----------------------------<  232<H>  4.5   |  24  |  5.15<H>    Ca    8.6      06 Jun 2022 20:11  Phos  4.5     06-07  Mg     2.2     06-06      PT/INR - ( 06 Jun 2022 21:21 )   PT: 19.7 sec;   INR: 1.70 ratio         PTT - ( 07 Jun 2022 10:59 )  PTT:28.5 sec    LIVER FUNCTIONS - ( 04 Jun 2022 22:42 )  Alb: 2.8 g/dL / Pro: 6.0 g/dL / ALK PHOS: 113 U/L / ALT: <5 U/L / AST: 11 U/L / GGT: x             RADIOLOGY & ADDITIONAL TESTS:

## 2022-06-08 NOTE — PROGRESS NOTE ADULT - SUBJECTIVE AND OBJECTIVE BOX
Patient is a 67y old  Male who presents with a chief complaint of Decreased urine output for the past week, leg oedema (08 Jun 2022 15:37)      Vascular Surgery Attending Progress Note    Interval HPI: pt intubated on vent support     Medications:  chlorhexidine 0.12% Liquid 15 milliLiter(s) Oral Mucosa every 12 hours  chlorhexidine 2% Cloths 1 Application(s) Topical <User Schedule>  erythromycin   Ointment 1 Application(s) Right EYE four times a day  HYDROmorphone  Injectable 1 milliGRAM(s) IV Push every 2 hours PRN  lidocaine   4% Patch 1 Patch Transdermal daily  LORazepam   Injectable 1 milliGRAM(s) IV Push every 2 hours PRN  pantoprazole  Injectable 40 milliGRAM(s) IV Push daily  petrolatum Ophthalmic Ointment 1 Application(s) Left EYE four times a day      Vital Signs Last 24 Hrs  T(C): 36.7 (08 Jun 2022 07:00), Max: 36.7 (08 Jun 2022 07:00)  T(F): 98.1 (08 Jun 2022 07:00), Max: 98.1 (08 Jun 2022 07:00)  HR: 136 (08 Jun 2022 15:02) (122 - 140)  BP: --  BP(mean): --  RR: 20 (08 Jun 2022 07:00) (20 - 20)  SpO2: 100% (08 Jun 2022 15:02) (94% - 100%)  I&O's Summary    07 Jun 2022 07:01  -  08 Jun 2022 07:00  --------------------------------------------------------  IN: 2579.3 mL / OUT: 0 mL / NET: 2579.3 mL        Physical Exam:  Vascular:  stable     LABS:                        7.8    24.02 )-----------( 228      ( 07 Jun 2022 13:21 )             23.5       Phos  4.5     06-07      PT/INR - ( 06 Jun 2022 21:21 )   PT: 19.7 sec;   INR: 1.70 ratio         PTT - ( 07 Jun 2022 10:59 )  PTT:28.5 sec    UMAIR MCGRAW MD  [phone] Cell [phone]

## 2022-06-08 NOTE — PROGRESS NOTE ADULT - PROBLEM SELECTOR PROBLEM 6
AMS (altered mental status)
AMS (altered mental status)
Carotid stenosis
AMS (altered mental status)
CAD (coronary artery disease)
Carotid stenosis
Carotid stenosis
AMS (altered mental status)
CAD (coronary artery disease)
AMS (altered mental status)
CAD (coronary artery disease)
CAD (coronary artery disease)
AMS (altered mental status)
AMS (altered mental status)

## 2022-06-08 NOTE — PROGRESS NOTE ADULT - ASSESSMENT
Echo 5/13/22:  1. Normal left ventricular internal dimensions and wall  thicknesses.  2. Endocardial visualization enhanced with intravenous  injection of Ultrasonic Enhancing Agent (Lumason). Grossly  borderline normal left ventricular systolic function; no  obvious segmental wall motion abnormality.  3. The right ventricle is not well visualized.  *** Compared with echocardiogram of 2/22/2022, no  significant changes noted.      A/P    66 y/o M with history of CAD, s/p multiple PCI, with most recent 2/22 PCI of LAD in setting of NSTEMI, on ASA only (pradaxa), chronic atrial fibrillation maintained on Pradaxa, essential HTN, type 2 DM previously on oral medications but on insulin, diabetic neuropathy with chronic RIGHT LE venous stasis changes>left, LEFT foot ulcer seen by podiatry, past endarterectomy LEFT CEA in 2014 presenting with 1 week of decreased urine output, cystitis with hematuria     #ANT on CKD, new HD  -oliguric renal failure in setting of UTI/cystitis  -New HD for uremia, renal failure  -s/p L AVG 4/18, s/p permacath placement 4/20  -renal f/ u    #AMS/Lethargy  -ams likely from pain meds, embolic cva  -repeat CT 4/27 unremarkable  -MRI 5/11 revealed small acute infarctions in bilateral posterior centrum semiovale and left corona radiata and left posterior periventricular white matter  -repeat echo with no interval changes  -sp RRT 6/43- possible aspiration event?  -IV abx        #Acute on chronic HFpEF  --continue volume removal with HD  --repeat echo with stable borderline lv fxn    #Chronic AF  -rates mildly elevated due to critical illness  -ccb/BB per  per NSICU   -AC held due to recurrent bleed    #Shock  -cont vasopressor as tolerated    #CAD, s/p PCI, most recent 2/2022  -c/w asa  -plavix on hold per neurosx   -statin     #carotid stenosis   -previous MRA  noted with Greater than 75% stenosis of the right distal common carotid artery. Approximately 60% stenosis of the proximal left internal carotid artery.  -carotid dopplers 5/11 noted :  There is new occlusion of the left internal carotid artery;  greater than 70% stenosis involving the distal right common carotid artery as was seen previously. Mild to moderate flow-limiting stenosis is seen at the left external   carotid artery.  -CTa head and neck 5/12 noted  -in light of gi bleed on triple therapy, carotid stenting deferred for now due to concern ability to treat post with DAP   -await possible CEA- patient remains stable from cardiac standpoint and can proceed for CEA with acceptable cardiac risk   -biggest concern for CEA would be neuro risk of atiya/post op CVA/IVH in light of overall status, recent cva on imaging, carotid disease burden, unilateral occlusion of ica and likely diffuse cerebrovascular disease   -holding plavix, cont ASA. eliquis on hold as well  -    #anemia  -sp egd 6/1 Grade C esophagitis, non bleeding erosive gastropathy, duodenitis, Non obstructing bleeding duodenal ulcer (2nd portion, 10mm in largest diameter) with active bleeding - endoclip x4 placed with control of bleeding, additional 6mm non bleeding clean based duodenal ulcer (2nd portion)  -on ppi gtt, sp PRBC , trend cbc   -GI fu   -continues to actively bleed  -s/p IR embolization  -may need repeat endo per GI    patients condition has progressed, likely new ischemic event  prognosis grave  now comfort care  discussed with family at the bedside    35 minutes spent on total encounter; more than 50% of the visit was spent counseling and/or coordinating care by the attending physician.

## 2022-06-08 NOTE — CONSULT NOTE ADULT - CONSULT REASON
UTI
AMS
ANT
Requesting IJ HD catheter; currently w/groin HD
melena/anemia
Emergent Shiley placement
right eye pain
bleeding from Shiley catheter
carotid stenosis
stroke
AMS
GOALS OF CARE
Left ICA occlusion
RIght posterior lower leg wound
alex
gi bleed
conversion of non-tunnelled to tunnelled HD catheter
SOB

## 2022-06-09 NOTE — PROVIDER CONTACT NOTE (OTHER) - NAME OF MD/NP/PA/DO NOTIFIED:
Dominguez DAVIS
FERNANDO Peña
JOYCE Shah
Daphnie EL
FERNANDO castro
JOYCE Peña
NP Emelin Reyes
Vascular Surgery ([id number])
claire Streeter
ACP Casandra Lamas
FERNANDO Holloway
JOYCE Jaimes
Jacobo Kapadia
Lenore ACP
FERNANDO Arnett
SUSAN Steinberg
JOYCE Akers
Lucrecia Hess
Michele Akers NP
NP Giulia Ruiz
SUSAN Vaca
FERNANDO Peña NP, RRT
Michele Akers NP
PA Lyn
Dominguez DAVIS
Francesca Peng NP
Kaitlynn Olivera
Marily Ocasio
SUSAN Delgadillo
SUSAN Kim, JOYCE Blake
Fernanda Granados

## 2022-06-09 NOTE — PROGRESS NOTE ADULT - PROBLEM SELECTOR PROBLEM 1
ESRD on dialysis
Shortness of breath
Shortness of breath
ESRD on dialysis
Shortness of breath
ESRD on dialysis
Shortness of breath
ESRD on dialysis
Respiratory failure
Shortness of breath
ESRD on dialysis
ESRD on dialysis
Shortness of breath
ESRD on dialysis
Shortness of breath
Shortness of breath
ESRD on dialysis
Shortness of breath
Respiratory failure
Shortness of breath
Shortness of breath
Respiratory failure
Shortness of breath

## 2022-06-09 NOTE — PROGRESS NOTE ADULT - PROBLEM SELECTOR PLAN 2
- PPSV: 10% The patient requires nursing assistance with all ADLs  - Supportive care  - Turn and position  - Continue with good skin care

## 2022-06-09 NOTE — PROGRESS NOTE ADULT - PROBLEM SELECTOR PLAN 3
- Non obstructive bleeding duodenal ulcer with active bleed , s/p endoclip  - Re bleed 6/5 with suboptimal Hgb response to transfusion, s/p IR angio celiac and SMA without contrast extravassation s/p empiric GDA embolization  - Recurrent/persistent GI bleeding 6/6  - 6/7  CTA active contrast extravasation in duodenum  Family opted for symptom mediated care, no further aggressive measures - Non obstructive bleeding duodenal ulcer with active bleed , s/p endoclip  - Re bleed 6/5 with suboptimal Hgb response to transfusion, s/p IR angio celiac and SMA without contrast extravasation s/p empiric GDA embolization  - Recurrent/persistent GI bleeding 6/6  - 6/7  CTA active contrast extravasation in duodenum  Family opted for symptom mediated care, no further aggressive measures

## 2022-06-09 NOTE — PROGRESS NOTE ADULT - NS ATTEND OPT1 GEN_ALL_CORE
I independently performed the documented:
I independently performed the documented:
I attest my time as attending is greater than 50% of the total combined time spent on qualifying patient care activities by the PA/NP and attending.
I attest my time as attending is greater than 50% of the total combined time spent on qualifying patient care activities by the PA/NP and attending.
I independently performed the documented:

## 2022-06-09 NOTE — PROGRESS NOTE ADULT - PROBLEM SELECTOR PLAN 1
- Intubated   - Vent settings: TV: 500 RR: 16 PEEP/CPAP: 5 Fi02: 40  - Continue with Dilaudid 1mg IV q1hr PRN for dyspnea (2 required within a 24hr period 8am-8am)  - Bowel regimen while on opioids

## 2022-06-09 NOTE — PROGRESS NOTE ADULT - PROBLEM SELECTOR PROBLEM 2
Acute kidney injury superimposed on CKD
Stenosis of right carotid artery
Acute kidney injury superimposed on CKD
Acute kidney injury superimposed on CKD
Stenosis of right carotid artery
Acute kidney injury superimposed on CKD
Stenosis of right carotid artery
Stenosis of right carotid artery
Acute kidney injury superimposed on CKD
Acute kidney injury superimposed on CKD
Stenosis of right carotid artery
Stenosis of right carotid artery
Acute kidney injury superimposed on CKD
Shortness of breath
Stenosis of right carotid artery
Acute kidney injury superimposed on CKD
Stenosis of right carotid artery
Stenosis of right carotid artery
Acute kidney injury superimposed on CKD
Functional quadriplegia
Stenosis of right carotid artery
Acute kidney injury superimposed on CKD
Stenosis of right carotid artery
Acute kidney injury superimposed on CKD
Shortness of breath

## 2022-06-09 NOTE — PROGRESS NOTE ADULT - NS ATTEND BILL GEN_ALL_CORE
Attending to bill
PA/NP to bill
Attending to bill
PA/NP to bill
Attending to bill

## 2022-06-09 NOTE — PROGRESS NOTE ADULT - PROBLEM SELECTOR PLAN 5
Spoke with the patient's spouse Cici and daughter Lucia at the bedside.  Educated as to what to expect.  Questions answered.  Family very tearful.   Cici shared with me that they have been   since she was 22 years old. They have two children( a boy and a girl).She described him as a good  and a father who enjoys cooking breakfast for his family. He also enjoys gardening. He is expecting his first grandchild this year. I spoke to the family about Child Life services. They would like handprints. Contacted child life.  Chaplaincy on board.   Date of compassionate extubation TBD. Family is not ready.  Emotional support provided. Spoke with the patient's spouse Cici and daughter Lucia at the bedside.  Educated as to what to expect.  Questions answered.  Family very tearful.   Cici shared with me that they have been   since she was 22 years old. They have two children( a boy and a girl).She described him as a good  and a father who enjoys cooking breakfast for his family. He also enjoys gardening. He is expecting his first grandchild this year. I spoke to the family about Child Life services. They would like handprints. Contacted child life.  Chaplaincy on board.   Date of compassionate extubation TBD. Family is not ready.  Emotional support provided.  Addendum: The family( spouse and children) asked that we compassionately remove the breathing tube this afternoon. They expressed that the patient would never have wanted this. Their priority is comfort. Explained to them the process and emotional support provided.

## 2022-06-09 NOTE — PROGRESS NOTE ADULT - TIME-BASED BILLING (NON-CRITICAL CARE)
Time-based billing (NON-critical care)

## 2022-06-09 NOTE — PROGRESS NOTE ADULT - SUBJECTIVE AND OBJECTIVE BOX
GAP TEAM PALLIATIVE CARE UNIT PROGRESS NOTE:      [  ] Patient on hospice program.    INDICATION FOR PALLIATIVE CARE UNIT SERVICES/Interval HPI: Symptom management and plan for compassionate extubation     OVERNIGHT EVENTS: Chart reviewed. The patient is seen and examined at the bedside. The patient required PRN Dilaudid 1mg IV q2hr PRN for dyspnea and PRN Ativan 1mg IV X1 within a 24hr period 8am-8am.     DNR on chart: Yes  Yes      Allergies    nitrofurantoin (Nephrotoxicity)    Intolerances    MEDICATIONS  (STANDING):  chlorhexidine 0.12% Liquid 15 milliLiter(s) Oral Mucosa every 12 hours  chlorhexidine 2% Cloths 1 Application(s) Topical <User Schedule>  erythromycin   Ointment 1 Application(s) Right EYE four times a day  lidocaine   4% Patch 1 Patch Transdermal daily  pantoprazole  Injectable 40 milliGRAM(s) IV Push daily  petrolatum Ophthalmic Ointment 1 Application(s) Left EYE four times a day    MEDICATIONS  (PRN):  acetaminophen  Suppository .. 650 milliGRAM(s) Rectal every 6 hours PRN Temp greater or equal to 38C (100.4F)  bisacodyl Suppository 10 milliGRAM(s) Rectal daily PRN Constipation  HYDROmorphone  Injectable 1 milliGRAM(s) IV Push every 1 hour PRN Dyspnea  HYDROmorphone  Injectable 1 milliGRAM(s) IV Push every 1 hour PRN Severe Pain (7 - 10)  HYDROmorphone  Injectable 0.5 milliGRAM(s) IV Push every 1 hour PRN Moderate Pain (4 - 6)  LORazepam   Injectable 1 milliGRAM(s) IV Push every 1 hour PRN Anxiety    ITEMS UNCHECKED ARE NOT PRESENT    PRESENT SYMPTOMS: [X ]Unable to self-report  [ X]PAINADs [ X]RDOS  Source if other than patient:  [ ]Family   [X ]Team     Pain: [ ] yes [ X] no Critical Care Observation Scale: 0  QOL impact -   Location -                    Aggravating factors -  Quality -  Radiation -  Timing-  Severity (0-10 scale):  Minimal acceptable level (0-10 scale):     PAINAD Score:  http://geriatrictoolkit.[email or web address].[email or web address]/cog/painad.pdf (Ctrl +  left click to view)    Dyspnea:                           [ ]Mild [ ]Moderate [ ]Severe    RDOS: 3. Mechanical Vent  0 to 2  minimal or no respiratory distress   3  mild distress  4 to 6 moderate distress  >7 severe distress  [email or web address]://[email or web address]/handouts/hen/Respiratory_Distress_Observation_Scale.pdf (Ctrl +  left click to view)     Anxiety:                             [ ]Mild [ ]Moderate [ ]Severe  Fatigue:                             [ ]Mild [ ]Moderate [ ]Severe  Nausea:                             [ ]Mild [ ]Moderate [ ]Severe  Loss of appetite:              [ ]Mild [ ]Moderate [ ]Severe  Constipation:                    [ ]Mild [ ]Moderate [ ]Severe  		  Other Symptoms:  [ ]All other review of systems negative-unable to assess    Palliative Performance Status Version 2:  10 %         [email or web address]_[email or web address]  PHYSICAL EXAM:   Vital Signs Last 24 Hrs  T(C): 37.1 (09 Jun 2022 08:13), Max: 38.3 (08 Jun 2022 19:00)  T(F): 98.8 (09 Jun 2022 08:13), Max: 100.9 (08 Jun 2022 19:00)  HR: 141 (09 Jun 2022 09:41) (103 - 150)  BP: 98/63 (09 Jun 2022 08:13) (98/63 - 103/47)  BP(mean): --  RR: 24 (08 Jun 2022 21:00) (24 - 25)  SpO2: 100% (09 Jun 2022 09:41) (98% - 100%) I&O's Summary    GENERAL: [ ] Cachexia  [ ]Alert  [ ]Oriented x   [ ]Lethargic  [ X]Unarousable  [ ]Verbal  [ X]Non-Verbal  Behavioral:   [ ] Anxiety  [ ] Delirium [ ] Agitation [ ] Other  HEENT:  [ ]Normal   [ ]Dry mouth   [X ]ET Tube/Trach  [ ]Oral lesions  PULMONARY:   [ ]Clear [ ]Tachypnea  [ ]Audible excessive secretions   [ ]Rhonchi        [ ]Right [ ]Left [ ]Bilateral  [ X]Crackles        [ ]Right [ ]Left [X ]Bilateral  [ ]Wheezing     [ ]Right [ ]Left [ ]Bilateral  [ ]Diminished BS [ ]Right [ ]Left [ ]Bilateral    CARDIOVASCULAR:    [ ]Regular [ ]Irregular [ X]Tachy  [ ]Bo [ ]Murmur [ ]Other  GASTROINTESTINAL:  [ X]Soft  [ X]Distended   [X ]+BS  [ ]Non tender [ ]Tender  [ ]Other [ ]PEG [ X]OGT/ NGT   Last BM:  6/7/22  GENITOURINARY:  [ ]Normal [ X] Incontinent   [ ]Oliguria/Anuria   [ ]Prado  MUSCULOSKELETAL:   [ ]Normal   [X ]Weakness  [X ]Bed/Wheelchair bound [X ]Edema  NEUROLOGIC:   [ ]No focal deficits  [X] Cognitive impairment  [ ] Dysphagia [ ]Dysarthria [ ] Paresis [ ]Other   SKIN: b/l LE dusky  [ ]Normal  [ ]Rash  [ X]Other Left foot diabetic ulcer. Please see nursing assessment for further details for which I have reviewed  [ ]Pressure ulcer(s)  [ ]y [ ]n  Present on admission      CRITICAL CARE:  [ ] Shock Present  [ ]Septic [ ]Cardiogenic [ ]Neurologic [ ]Hypovolemic  [ ]  Vasopressors [ ]  Inotropes   [X ] Respiratory failure present [ X] Mechanical Ventilation [ ] Non-invasive ventilatory support [ ] High-Flow  [ ] Acute  [ ] Chronic [ ] Hypoxic  [ ] Hypercarbic [ ] Other  [ X] Other organ failure- brain, kidneys    LABS:                        7.8    24.02 )-----------( 228      ( 07 Jun 2022 13:21 )             23.5     Phos  4.5     06-07    RADIOLOGY & ADDITIONAL STUDIES: Reviewed    PROTEIN CALORIE MALNUTRITION: [ ] mild [ ] moderate [ ] severe  [ ] underweight [ ] morbid obesity    https://www.andeal.org/vault/2440/web/files/ONC/Table_Clinical%20Characteristics%20to%20Document%20Malnutrition-White%20JV%20et%20al%372662.pdf    Height (cm): 180.3 (06-06-22 @ 17:25), 180.3 (02-18-22 @ 13:42), 180.3 (10-21-21 @ 12:00)  Weight (kg): 115.3 (06-07-22 @ 14:15), 134.3 (02-19-22 @ 00:44), 135.9 (10-21-21 @ 21:44)  BMI (kg/m2): 35.5 (06-07-22 @ 14:15), 41.3 (06-06-22 @ 17:25), 41.3 (02-19-22 @ 00:44)    [ X] PPSV2 < or = 30% [ ] significant weight loss [ ] poor nutritional intake [ ] anasarca   Artificial Nutrition [ ]     REFERRALS:   [X ]Chaplaincy  [ ] Hospice  [ ]Child Life  [ X]Social Work  [ ]Case management [ ]Holistic Therapy [ ] Physical Therapy [ ] Dietary   Goals of Care Document:   GAP TEAM PALLIATIVE CARE UNIT PROGRESS NOTE:      [  ] Patient on hospice program.    INDICATION FOR PALLIATIVE CARE UNIT SERVICES/Interval HPI: Symptom management and plan for compassionate extubation     OVERNIGHT EVENTS: Chart reviewed. The patient is seen and examined at the bedside. The patient required PRN Dilaudid 1mg IV X2 for dyspnea and PRN Ativan 1mg IV X1 within a 24hr period 8am-8am.     DNR on chart: Yes  Yes      Allergies    nitrofurantoin (Nephrotoxicity)    Intolerances    MEDICATIONS  (STANDING):  chlorhexidine 0.12% Liquid 15 milliLiter(s) Oral Mucosa every 12 hours  chlorhexidine 2% Cloths 1 Application(s) Topical <User Schedule>  erythromycin   Ointment 1 Application(s) Right EYE four times a day  lidocaine   4% Patch 1 Patch Transdermal daily  pantoprazole  Injectable 40 milliGRAM(s) IV Push daily  petrolatum Ophthalmic Ointment 1 Application(s) Left EYE four times a day    MEDICATIONS  (PRN):  acetaminophen  Suppository .. 650 milliGRAM(s) Rectal every 6 hours PRN Temp greater or equal to 38C (100.4F)  bisacodyl Suppository 10 milliGRAM(s) Rectal daily PRN Constipation  HYDROmorphone  Injectable 1 milliGRAM(s) IV Push every 1 hour PRN Dyspnea  HYDROmorphone  Injectable 1 milliGRAM(s) IV Push every 1 hour PRN Severe Pain (7 - 10)  HYDROmorphone  Injectable 0.5 milliGRAM(s) IV Push every 1 hour PRN Moderate Pain (4 - 6)  LORazepam   Injectable 1 milliGRAM(s) IV Push every 1 hour PRN Anxiety    ITEMS UNCHECKED ARE NOT PRESENT    PRESENT SYMPTOMS: [X ]Unable to self-report  [ X]PAINADs [ X]RDOS  Source if other than patient:  [ ]Family   [X ]Team     Pain: [ ] yes [ X] no Critical Care Observation Scale: 0  QOL impact -   Location -                    Aggravating factors -  Quality -  Radiation -  Timing-  Severity (0-10 scale):  Minimal acceptable level (0-10 scale):     PAINAD Score:  http://geriatrictoolkit.[place].[facility]/cog/painad.pdf (Ctrl +  left click to view)    Dyspnea:                           [ ]Mild [ ]Moderate [ ]Severe    RDOS: 3. Mechanical Vent  0 to 2  minimal or no respiratory distress   3  mild distress  4 to 6 moderate distress  >7 severe distress  [email or web address]://[email or web address]/handouts/hen/Respiratory_Distress_Observation_Scale.pdf (Ctrl +  left click to view)     Anxiety:                             [ ]Mild [ ]Moderate [ ]Severe  Fatigue:                             [ ]Mild [ ]Moderate [ ]Severe  Nausea:                             [ ]Mild [ ]Moderate [ ]Severe  Loss of appetite:              [ ]Mild [ ]Moderate [ ]Severe  Constipation:                    [ ]Mild [ ]Moderate [ ]Severe  		  Other Symptoms:  [ ]All other review of systems negative-unable to assess    Palliative Performance Status Version 2:  10 %         [email or web address]_performance_scale_[email or web address]  PHYSICAL EXAM:   Vital Signs Last 24 Hrs  T(C): 37.1 (09 Jun 2022 08:13), Max: 38.3 (08 Jun 2022 19:00)  T(F): 98.8 (09 Jun 2022 08:13), Max: 100.9 (08 Jun 2022 19:00)  HR: 141 (09 Jun 2022 09:41) (103 - 150)  BP: 98/63 (09 Jun 2022 08:13) (98/63 - 103/47)  BP(mean): --  RR: 24 (08 Jun 2022 21:00) (24 - 25)  SpO2: 100% (09 Jun 2022 09:41) (98% - 100%) I&O's Summary    GENERAL: [ ] Cachexia  [ ]Alert  [ ]Oriented x   [ ]Lethargic  [ X]Unarousable  [ ]Verbal  [ X]Non-Verbal  Behavioral:   [ ] Anxiety  [ ] Delirium [ ] Agitation [ ] Other  HEENT:  [ ]Normal   [ ]Dry mouth   [X ]ET Tube/Trach  [ ]Oral lesions  PULMONARY:   [ ]Clear [ ]Tachypnea  [ ]Audible excessive secretions   [ ]Rhonchi        [ ]Right [ ]Left [ ]Bilateral  [ X]Crackles        [ ]Right [ ]Left [X ]Bilateral  [ ]Wheezing     [ ]Right [ ]Left [ ]Bilateral  [ ]Diminished BS [ ]Right [ ]Left [ ]Bilateral    CARDIOVASCULAR:    [ ]Regular [ ]Irregular [ X]Tachy  [ ]Bo [ ]Murmur [ ]Other  GASTROINTESTINAL:  [ X]Soft  [ X]Distended   [X ]+BS  [ ]Non tender [ ]Tender  [ ]Other [ ]PEG [ X]OGT/ NGT   Last BM:  6/7/22  GENITOURINARY:  [ ]Normal [ X] Incontinent   [ ]Oliguria/Anuria   [ ]Prado  MUSCULOSKELETAL:   [ ]Normal   [X ]Weakness  [X ]Bed/Wheelchair bound [X ]Edema  NEUROLOGIC:   [ ]No focal deficits  [X] Cognitive impairment  [ ] Dysphagia [ ]Dysarthria [ ] Paresis [ ]Other   SKIN: b/l LE dusky  [ ]Normal  [ ]Rash  [ X]Other Left foot diabetic ulcer. Please see nursing assessment for further details for which I have reviewed  [ ]Pressure ulcer(s)  [ ]y [ ]n  Present on admission      CRITICAL CARE:  [ ] Shock Present  [ ]Septic [ ]Cardiogenic [ ]Neurologic [ ]Hypovolemic  [ ]  Vasopressors [ ]  Inotropes   [X ] Respiratory failure present [ X] Mechanical Ventilation [ ] Non-invasive ventilatory support [ ] High-Flow  [ ] Acute  [ ] Chronic [ ] Hypoxic  [ ] Hypercarbic [ ] Other  [ X] Other organ failure- brain, kidneys    LABS:                        7.8    24.02 )-----------( 228      ( 07 Jun 2022 13:21 )             23.5     Phos  4.5     06-07    RADIOLOGY & ADDITIONAL STUDIES: Reviewed    PROTEIN CALORIE MALNUTRITION: [ ] mild [ ] moderate [ ] severe  [ ] underweight [ ] morbid obesity    https://www.andeal.org/vault/2440/web/files/ONC/Table_Clinical%20Characteristics%20to%20Document%20Malnutrition-White%20JV%20et%20al%202012.pdf    Height (cm): 180.3 (06-06-22 @ 17:25), 180.3 (02-18-22 @ 13:42), 180.3 (10-21-21 @ 12:00)  Weight (kg): 115.3 (06-07-22 @ 14:15), 134.3 (02-19-22 @ 00:44), 135.9 (10-21-21 @ 21:44)  BMI (kg/m2): 35.5 (06-07-22 @ 14:15), 41.3 (06-06-22 @ 17:25), 41.3 (02-19-22 @ 00:44)    [ X] PPSV2 < or = 30% [ ] significant weight loss [ ] poor nutritional intake [ ] anasarca   Artificial Nutrition [ ]     REFERRALS:   [X ]Chaplaincy  [ ] Hospice  [ ]Child Life  [ X]Social Work  [ ]Case management [ ]Holistic Therapy [ ] Physical Therapy [ ] Dietary   Goals of Care Document:

## 2022-06-09 NOTE — DISCHARGE NOTE FOR THE EXPIRED PATIENT - HOSPITAL COURSE
67M w/ prolonged hospital course renal failure req dialysis c/b b/l hypoperfusions strokes from R ICA stenosis 70-80% and L ICA occlusion (prior CEA) planned for BOOKER stent but course c/b acute uremia , GIB (melena) , and additional hypoperfusion episode and xfer to nscu. GAP called for goals of care and advance care planning. Patient now on PCU for symptom management and compassionate extubation which took place today.  Patient  a short time later at 3:40pm  family at bedside.

## 2022-06-09 NOTE — PROVIDER CONTACT NOTE (OTHER) - ACTION/TREATMENT ORDERED:
NP aware.
Provider made aware; No new orders at this time, will continue to monitor.
provider aware, will resschedule next ptt draw.
FERNANDO Peña applied pressure. RRT was called. RRT ordered labs, pt positioned to trendelenburg. Pt VS remained stable, site stopped bleeding. RRT ended at 19:30. Pressure applied to site for 30 min.
SUSAN Delgadillo notified and seen patient at bedside. EKG performed. Metoprolol 5ml given x2. Will reassess heart rate.
Heparin gtt stopped as per order. Ptt and CBC sent as per order. Based on CBC, pt will/ will not be transfused.
JOYCE Peña made aware. No further instructions at this time.
NP aware.
NP aware. IV hydralazine ordered, Cardizem gtt initiated at 10ml/hr per Cardiology & IV Tylenol ordered for pain. NP to reach out about possible psych evaluation due to patients delisional state
Per Dr. Eller he will d/c the order if the patient is refusing it.
ACP agree to hold Miralax
NP aware. Leadership, nursing ed and administration at bedside.
Provider aware and assessed pt at bedside. Provider to be notified if any changes occur.
Provider made aware. No new orders at this time. Will continue to monitor
Pt educated on importance of medications.
provider aware.   pt educated on medication benefits and consequences of not taking the medication; pt verbalized understanding.  Will continue with current POC and update as needed.
Continue to monitor, give AM meds at 0500.
Follow sliding scale orders and repeat fingerstick in 2 hours.
No new orders. Will monitor pt
Provider aware
see expiration note
No further orders at this time. Will continue to monitor pt.
ACP Lucrecia Hess aware. Ordered to move hydralazine to 5PM and give it now as BP is within parameters. Was told that 10PM dose can also still be given if BP is within parameters.
IV metoprolol ordered. Will continue to monitor pt.
NP Emelin Reyes made aware. Orders to maintain HOB 30-45 degrees. Will continue to monitor.
PA aware.
PA d/c'd 1300 PTT for 04/05/22, as per PA it is okay to only do daily PTT, since heparin has been therapeutic x3.
Provider made aware
Vascular surgery will contact Rn for further instructions on when to stop heparin drip
provider aware  Will continue with current POC and update as needed.

## 2022-06-09 NOTE — CHART NOTE - NSCHARTNOTESELECT_GEN_ALL_CORE
EEG Prelim
Event Note
IR
Interventional Radiology/Event Note
Neurosurgery/Event Note
Nutrition Services
RRT called for excessive bleeding from right shiley/Event Note
removal of femoral dialysis catheter/Event Note
vascular surgery/Event Note
2 sec pauses and bradycardia/Event Note
Bradycardia/Event Note
Event Note
HD/Event Note
Heparin GTT/Event Note
IR Plan/Event Note
Neurosurgery/Event Note
Nutrition Services
Permcath/Event Note
Post OP Check
Post RRT Eval./Event Note
Transfer Note/Event Note
[facility] [place] [facility]
Vascular
Vascular Surgery Preop
s/p RRT/Event Note

## 2022-06-09 NOTE — PROGRESS NOTE ADULT - PROBLEM SELECTOR PROBLEM 5
Carotid stenosis
Carotid stenosis
AMS (altered mental status)
CAD (coronary artery disease)
Carotid stenosis
AMS (altered mental status)
Carotid stenosis
AMS (altered mental status)
AMS (altered mental status)
CAD (coronary artery disease)
AMS (altered mental status)
CAD (coronary artery disease)
CAD (coronary artery disease)
GIB (gastrointestinal bleeding)
AMS (altered mental status)
GIB (gastrointestinal bleeding)
Encounter for palliative care
CAD (coronary artery disease)

## 2022-06-09 NOTE — PROGRESS NOTE ADULT - ASSESSMENT
67M w/ prolonged hospital course renal failure req dialysis c/b b/l hypoperfusions strokes from R ICA stenosis 70-80% and L ICA occlusion (prior CEA) planned for BOOKER stent but course c/b acute uremia , GIB (melena) , and additional hypoperfusion episode and xfer to nscu. GAP called for goals of care and advance care planning. Patient now on PCU for symptom management and compassionate extubation. Ongoing goc with the family.

## 2022-06-09 NOTE — PROVIDER CONTACT NOTE (OTHER) - BACKGROUND
PMH CAD, PCI, MI, afib, [id number], CVA, CEA, peripheral neuropathy, stage 3CKD, covid19, TIA, HLD, HTN. Current admission for CKD.
Pt admitted for chronic kidney disease. Pt was in the OR to place AV fistula 04/18.
pt admitted with CKD. Pt has been having these pauses on tele.
Pt admitted for ANT
Pt admitted for ANT, hemodialysis. Pt is afib on telemetry.
Pt s/p IR, coils to stop bleeding in Gastro arteries, closure device.
67 year old male admitted on 4/3/22 for ANT.
Dx: Lara
PMH CAD, PCI, MI, afib, [id number], CVA, CEA, peripheral neuropathy, stage 3CKD, covid19, TIA, HLD, HTN. Current admission for CKD.
Patient admitted for ANT on CKD. PMHx of AFib.
Pt admitted with ANT
Pt admitted with chronic kidney disease. Pt started on HD 4/3. Pt due for lipitor tonight.
Pt was admitted for chronic kidney disease. Pt to go to OR 4/18 and is on heparin drip
Pt is Ao4, with chronic kidney disease, hemodialysis pt. Pt's heparin was paused prior due to surgery, but has been restarted. Pharmacy has not dispensed the medication since afternoon
Pt admitted for ANT on CKD
Pt admitted for chronic kidney disease
Hx: MI, CAD, Afib, TIA, DM2, HLD, HTN
Patient admitted for chronic kidney disease.
Pt admitted for chronic kidney disease
Pt came in for ANT on CKD, HD T/Th/S. PMH of type 2 DM, neuropathy, HTN, and HLD.
Hx: MI, CAD, afib, TIA, DM2, HLD, HTN
Pt admitted for ANT, now pending placement
Pt admitted for ANT. Hx of MI, CAD, afib, DM, HLD
Pt was on heparin gtt at 24 ml/hr that was stopped at 18:00. Hx: MI, CAD, Afib, TIA, DM2, HLD, HTN
pt has DNR
Pt admitted for chronic kidney disease
pt admitted for ANT
68 yo male admitted for chronic kidney disease.

## 2022-06-09 NOTE — PROVIDER CONTACT NOTE (OTHER) - DATE AND TIME:
03-Apr-2022 06:48
06-Apr-2022 15:48
09-Apr-2022 12:59
09-Jun-2022 15:40
15-Apr-2022 12:00
24-May-2022 20:30
20-Apr-2022 00:30
21-Apr-2022 22:00
24-May-2022 23:20
30-Apr-2022 23:00
06-May-2022 02:58
13-May-2022 02:00
19-Apr-2022 03:55
21-May-2022 23:14
02-May-2022 02:00
03-May-2022 23:45
07-May-2022 22:57
08-May-2022 02:53
08-May-2022 02:53
17-May-2022 18:33
05-Apr-2022 21:40
08-May-2022 16:38
18-Apr-2022 04:00
19-Apr-2022 17:28
07-Jun-2022 03:00
07-May-2022 06:48
16-May-2022 22:00
17-May-2022 06:42
18-May-2022 23:35
12-Apr-2022 14:13
12-Apr-2022 19:00

## 2022-06-09 NOTE — PROGRESS NOTE ADULT - NS ATTEND OPT1A GEN_ALL_CORE
Medical decision making
History/Exam/Medical decision making
Medical decision making
Medical decision making
Exam/Medical decision making
Exam/Medical decision making
History/Exam/Medical decision making
History/Exam/Medical decision making
Medical decision making
Exam/Medical decision making
History/Exam/Medical decision making
History/Exam/Medical decision making
Medical decision making
Exam/Medical decision making
Medical decision making

## 2022-06-09 NOTE — PROGRESS NOTE ADULT - NS ATTEND AMEND GEN_ALL_CORE FT
67M w/ prolonged hospital course renal failure req dialysis c/b b/l hypoperfusions strokes from R ICA stenosis 70-80% and L ICA occlusion (prior CEA) planned for BOOKER stent but course c/b acute uremia , GIB (melena) , and additional hypoperfusion episode and xfer to nscu. GAP called for goals of care and advance care planning. Patient now on PCU for symptom management and compassionate extubation which will take place today - please see documentation above and in separate note.  Patient assessment and plan discussed on interdisciplinary team rounds today.   Hospice transition to be addressed if clinical condition permits.

## 2022-06-09 NOTE — CHART NOTE - NSCHARTNOTEFT_GEN_A_CORE
The patient's family members( spouse and children) asked the team to compassionately remove the breathing tube. The most important thing to them is that the patient is comfortable. They expressed that the patient would never have wanted this.   Compassionate extubation to RA order placed as per family wishes.  Educated as to what to expect.  Questions answered.  Emotional support provided.  Pre medicated with Robinul 0.4mg IV X1, PRN Dilaudid 1mg IV X1 and PRN Ativan 1mg IV X1  Patient compassionately extubated by the respiratory therapist to room air.  Daughter, primary RN, Respiratory therapist and myself present.  Emotional support provided to the family  Will continue to monitor. The patient's family members( spouse and children) asked the team to compassionately remove the breathing tube. The most important thing to them is that the patient is comfortable. They expressed that the patient would never have wanted this.   Compassionate extubation to RA order placed as per family wishes.  Educated as to what to expect.  Questions answered.  Emotional support provided.  Pre medicated with Robinul 0.4mg IV X1, PRN Dilaudid 1mg IV X1 and PRN Ativan 1mg IV X1  Patient compassionately extubated by the respiratory therapist to room air.  Daughter, primary RN, Respiratory therapist and myself present.  Emotional support provided to the family  Will continue to monitor.    s/p compassionate extubation, patient appears comfortable, family at bedside,  educated as to what to expect.  Questions answered.  Emotional support provided.

## 2022-06-09 NOTE — DISCHARGE NOTE FOR THE EXPIRED PATIENT - SECONDARY DIAGNOSIS.
AMS (altered mental status) Orthostatic hypotension Acute renal failure on dialysis DM2 (diabetes mellitus, type 2) CVA (cerebrovascular accident) Carotid stenosis Acute cystitis Chronic atrial fibrillation

## 2022-06-09 NOTE — AIRWAY REMOVAL NOTE  ADULT & PEDS - ARTIFICAL AIRWAY REMOVAL COMMENTS
Written order for extubation verified. The patient was identified by full name and birth date compared to the identification band.  Present during the procedure was Lucrecia RAMOS

## 2022-06-09 NOTE — PROGRESS NOTE ADULT - PROBLEM SELECTOR PROBLEM 4
Cystitis
Cystitis
CAD (coronary artery disease)
CVA (cerebrovascular accident)
Cystitis
Cerebrovascular accident (CVA)
CVA (cerebrovascular accident)
GIB (gastrointestinal bleeding)
CAD (coronary artery disease)
CAD (coronary artery disease)
GIB (gastrointestinal bleeding)
CAD (coronary artery disease)
Cystitis
CAD (coronary artery disease)
Cystitis
Cystitis
CAD (coronary artery disease)
CAD (coronary artery disease)
GIB (gastrointestinal bleeding)
CAD (coronary artery disease)
Cystitis
CAD (coronary artery disease)
Cystitis
Cystitis
GIB (gastrointestinal bleeding)
Cystitis
Cerebrovascular accident (CVA)
Cystitis

## 2022-06-09 NOTE — PROGRESS NOTE ADULT - PROBLEM SELECTOR PROBLEM 3
Carotid occlusion, left
Cystitis
Carotid occlusion, left
Cystitis
Carotid occlusion, left
Carotid occlusion, left
Cerebrovascular accident (CVA)
Cystitis
Carotid occlusion, left
Carotid occlusion, left
Cerebrovascular accident (CVA)
Cystitis
Carotid occlusion, left
Cerebrovascular accident (CVA)
Cystitis
Carotid occlusion, left
Cystitis
Carotid occlusion, left
Carotid occlusion, left
Cerebrovascular accident (CVA)
Cystitis
Cystitis
GIB (gastrointestinal bleeding)
Carotid occlusion, left
Cystitis
Cystitis
Cerebrovascular accident (CVA)
Cystitis
Acute kidney injury superimposed on CKD
Cystitis
Cerebrovascular accident (CVA)
Cystitis
Cerebrovascular accident (CVA)
Cerebrovascular accident (CVA)
Cystitis
Cerebrovascular accident (CVA)
Cerebrovascular accident (CVA)
Acute kidney injury superimposed on CKD

## 2022-06-09 NOTE — PROVIDER CONTACT NOTE (OTHER) - SITUATION
Provider removed JOYCELYN mejias, pt started to bleed out. RRT called at 19:00.
Pt s/p IR, Pt's melena worsening.
Recommendation to hold Miralax
Please d/c PTT for 1300 today, next PTT should be with AM lab on 04/16/22.
Pt refused PM meds, due for atorvastatin & senna
Pt. refusing PO Eliquis and metoprolol.
Hydralazine moved to 5PM & pt refuses sevelamer carbonate. Pt was at hemodialysis during 2PM hydralazine med administration
Patient blood glucose 401.
Pt refused senna
Pt p/w new onset of abdominal breathing and trace RLE edema. VSS and pt remains stable with no acute changes.
Pt to go to OR 4/18 and is on heparin drip
Pt unable to swallow spills
pt Afib with a pause for 2 sec on cardiac monitor with HR 60-70
pt refusing PO meds/food at this time
Patient tachycardic on telemetry monitor: 
Pt not answering LOC questions and following commands during neuro check
Pt refused Prado cath insertion in ED, also refuses to get the catheter inserted here. Per pt he wants to talk to his daughter about it first.
Pt refused miralax
Pt refusing medications. Pt returned from dialysis and is refusing meds.
Pt and wife refused PM Brenda
Pt had 2.1 second pause on tele at (22:47)
Pt had pause on tele for 2.2 secs
Pt on heparin gtt- full anticoagulation. Last aPTT was subtherapeutic (44.0). Nomogram now stating to change rate to 42ml/hr and bolus with 5000units. Guardrails on Alaris pump don't allow for rate>40
patient agitated, screaming & refusing PO medications at this time
pt without respirations or heart beat
As per telemetry technician, heart rate drop to 35 bpm for less than 2 seconds on cardiac monitor
noticed bleeding from Right chestwall permacath dsg site; light pink/red blood noted within dsg. No active bleeding noted at this time.
Heparin was ordered for pt early afternoon but pharmacy did not dispense it after several times of asking for the order.
Pt -130 on telemetry
Pt refused PM senna
Pt. refusing all morning medications.

## 2022-06-09 NOTE — PROGRESS NOTE ADULT - TIME-BASED
35
45
45
35
45
35
45
35
30

## 2022-06-09 NOTE — PROGRESS NOTE ADULT - PROBLEM SELECTOR PLAN 4
-S/p EGD 6/1 found to have gastritis, oozing duodenal ulcer s/p clips       -PPI  -Trend H&H  -GI f/u
W/ hx of multiple stents  -Per cards.
W/ hx of multiple stents  -Per cards.
Presents with dark urine with hematuria, decreased UO  -CT A/P with no hydronephrosis, nephrolithiasis, or obstruction  -UC negative  -S/p ABX per ID.
W/ hx of multiple stents  -Per cards.
Presents with dark urine with hematuria, decreased UO  -CT A/P with no hydronephrosis, nephrolithiasis, or obstruction  -UC negative  -S/p ABX per ID.
Presents with dark urine with hematuria, decreased UO  -CT A/P with no hydronephrosis, nephrolithiasis, or obstruction  -UC negative  -S/p ABX per ID.
W/ hx of multiple stents  -Per cards.
Presents with dark urine with hematuria, decreased UO  -CT A/P with no hydronephrosis, nephrolithiasis, or obstruction  -UC negative  -S/p ABX per ID.
W/ hx of multiple stents  -Per cards.
Presents with dark urine with hematuria, decreased UO  -CT A/P with no hydronephrosis, nephrolithiasis, or obstruction  -UC negative  -S/p ABX per ID.
-S/p EGD 6/1 found to have gastritis, oozing duodenal ulcer s/p clips       -PPI  -Trend H&H  -GI f/u
Presents with dark urine with hematuria, decreased UO  -CT A/P with no hydronephrosis, nephrolithiasis, or obstruction  -UC negative  -S/p ABX per ID.
Presents with dark urine with hematuria, decreased UO  -CT A/P with no hydronephrosis, nephrolithiasis, or obstruction  -UC negative  -S/p ABX per ID.
W/ hx of multiple stents  -Per cards.
-S/p EGD 6/1 found to have gastritis, oozing duodenal ulcer s/p clips       -PPI  -Trend H&H  -GI f/u
W/ hx of multiple stents  -Per cards.
I Edward Pinto MD have personally  seen and examined the patient today and have noted the findings and formulated the plan of care.  I Edward Pinto MD have personally seen and examined the patient at bedside today a 4pm
MR head with multiple small acute infarctions  -Repeat TTE with no changes   -+carotid stenosis   -Neuro f/u
W/ hx of multiple stents  -Per cards.
W/ hx of multiple stents  -Per cards.
- 5/31 MRI with multiple acute infarcts.  - Symptom management
W/ hx of multiple stents  -Per cards.
Presents with dark urine with hematuria, decreased UO  -CT A/P with no hydronephrosis, nephrolithiasis, or obstruction  -UC negative  -S/p ABX per ID.
W/ hx of multiple stents  -Per cards.
W/ hx of multiple stents  -Per cards.
Presents with dark urine with hematuria, decreased UO  -CT A/P with no hydronephrosis, nephrolithiasis, or obstruction  -UC negative  -S/p ABX per ID.
W/ hx of multiple stents  -Per cards.
Presents with dark urine with hematuria, decreased UO  -CT A/P with no hydronephrosis, nephrolithiasis, or obstruction  -UC negative  -S/p ABX per ID.
Presents with dark urine with hematuria, decreased UO  -CT A/P with no hydronephrosis, nephrolithiasis, or obstruction  -UC negative  -S/p ABX per ID.
-S/p EGD 6/1 found to have gastritis, oozing duodenal ulcer s/p clips       -PPI  -Now with melena again and downtrending H&H. Plans for embolization in IR.   -GI f/u
MR head with multiple small acute infarctions  -Repeat TTE with no changes   -+carotid stenosis   -Neuro f/u

## 2022-06-09 NOTE — PROGRESS NOTE ADULT - NS_MD_PANP_GEN_ALL_CORE

## 2022-06-09 NOTE — PROVIDER CONTACT NOTE (OTHER) - REASON
Blood Glucose 401
Hydralazine moved to 5PM & pt refuses sevelamer carbonate
Patient tachycardic on telemetry monitor: 
Pt refused senna
Heparin not received from pharmacy
Pt Afib with a pause for 2 sec on cardiac monitor with HR 60-70
Pt and wife refused PM Brenda
Pt not answering LOC questions and following commands during neuro check
Pt refused miralax
pt refusing PO meds/food at this time
New onset symptom - abdominal breathing
Pt had 2.1 second pause on tele at (22:47)
Pt had pause on tele for 2.2 secs
Pt s/p IR, Pt's melena worsening.
RRT called
Refusing Prado catheter insertion
noticed bleeding from Right chestwall permacath dsg site.
patient agitated, screaming & refusing PO medications at this time
patient expiration
As per telemetry technician, heart rate drop to 35 bpm for less than 2 seconds on cardiac monitor
Recommendation to hold Miralax
Pt -130 on telemetry
Pt refused PM meds
Pt refused PM senna
Pt refusing medications.
Pt unable to swallow pills
Clarification on Heparin drip
Pt. still refusing medications
Heparin drip clarification
Patient Heparin drip has been therapeutic x3, a new order of needed due to scanning error after resulting of AM PTT on 4/15. A new PTT generated for 1343, but PTT only needed daily. Please D/C PTT order as it is only needed daily. Next PTT should be on 4/16/22  with AM lab.
Pt. refusing medications

## 2022-06-09 NOTE — PROGRESS NOTE ADULT - TIME BILLING
Agree with above NP note.  cv stable  brief alisia last night -- now stable   d/c planning to [facility]  cont current tx
Agree with above NP note.  cv stable  brief rvr last night ref am meds prior to episode -- now stable   d/c planning to [facility]  cont current tx
Agree with above NP note.  cv stable  cont current tx
Agree with above NP note.  cv stable  d/c planning ot [facility]  cont current tx
Agree with above NP note.  cv stable  d/c planning to [facility]  cont current tx
Agree with above NP note.  cv stable  inc ccb for rate control  vol removal with HD  cont current tx
Agree with above NP note.  cv stable and optimized for AVF/G today   cont current tx  eventual restart oral a/c
Agree with above NP note.  events noted  s/p EGD with treatment  on asa, oral a/c  monitor CBC  neuro status unchanged  BP control per neuro  neurosx/vasc noted reviewed  in light of gi bleed on triple therapy, carotid stenting deferred for now due to concern ability to treat post with DAP   await possible CEA  patient remains stable from cardiac standpoint and can proceed for CEA with acceptable cardiac risk  biggest concern for CEA would be neuro risk of atiya/post op CVA/IVH in light of overall status, recent cva on imaging, carotid disease burden, unilateral occlusion of ica and likely diffuse cerebrovascular disease     d/w vasc surgery   family to decide on any potential cea  cont care per neurosx icu
Patient seen and examined.  Agree with above NP note.  cv stable  cont oral a/c  cont asa  hd per renal
Pt seen and examined, agree with the above assessment and plan by JOYCE Hansen.  awake, mildly confused, anxious wants to go home  afib rates 120-130's  still not cleared to take PO  will start IV cardizem gtt for the time to achieve rate control  further HD per renal   continue IV heparin for A/C for AF  asa for cad/pci   clinically appears hypervolemic   iv bumex as needed
agree with above  cv stable  new HD for uremia, renal failure  await HD access   remains overloaded but improved  continue aggressive volume removal with HD  now tolerating po - c/w  bb  Give dilt cd 120mg today    d/c iv dilt drip after hd access placed  hep gtt on hold for IR today-- resume ac per IR
agree with the above assessment and plan by JOYCE Tompkins.  events noted  + melena  remains  plan for cerebral angio and possible EGD  transfuse prn  cont ac as tolerated per neuro  BP control per neuro
Agree with above NP note.  cv stable  cont current tx
Agree with above NP note.  cv stable  cont current tx  PT
Agree with above NP note.  events noted  neuro eval noted  cont asa, a/c for now   neuro ir planning for poss right carotid stent   would load plavix 600mg today if procedure planned  med/vasc f/u  cont a/c  cont vol removal with hd
Agree with above NP note.  s/p avg  cv stable  cont current tx  hd per renal   cont asa  eventual restart oral a/c
Agree with above NP note.  tolerating HD  remains lethargic  further HD per renal   continue IV heparin for A/C for AF  asa for cad/pci   clinically appears hypervolemic   iv bumex as needed  cont rate control meds
Patient seen and examined, agree with the above assessment and plan by JOYCE Tompkins.  cv stable, clinically improved  cont a/c  cont BB/CCB  continue aggressive volume removal with HD  await tunn hd cath on monday   cv stable to proceed for cath.  await eventual AVF  d/w family at bedside
Patient seen and examined, agree with the above assessment and plan by JOYCE Tompkins.  events noted  remains encephalopathic   plan for cerebral angio tomorrow  transfuse prn  cont ac as tolerated  BP control per neuro
Patient seen and examined, agree with the above assessment and plan by JOYCE Tompkins.  pt appears more lucid today  overnight events noted, no further bleeding, AC resumed  cont BB/CCB  continue aggressive volume removal with HD  dcp
agree with the above assessment and plan by JOYCE Hansen  cv stable, clinically improved  cont a/c  cont BB/CCB  continue aggressive volume removal with HD  await tunn hd cath on monday   cv stable to proceed for procedure  await eventual AVF-pt optimized
agree with the above assessment and plan by JOYCE Tompkins.  cv stable, clinically improved  cont a/c  cont BB/CCB  continue aggressive volume removal with HD  await tunn hd cath on monday   cv stable to proceed for cath.  await eventual AVF-pt optimized
agree with the above assessment and plan by JOYCE Tompkins.  cv stable, clinically improved  cont a/c  cont BB/CCB  continue aggressive volume removal with HD  await tunn hd cath on monday   cv stable to proceed for cath.  await eventual AVF-pt optimized  d/w family at bedside
Agree with above NP note.  cv stable  cont current tx
Agree with above NP note.  cv stable  cont current tx  PT
Agree with above NP note.  cv stable  cont current tx  PT  med/renal f/u
Agree with above NP note.  cv stable  cont current tx  PT  med/renal f/u
Agree with above NP note.  cv stable  d/c planning ot [facility]  cont current tx
Agree with above NP note.  events noted  AMS following perocet this AM  now improving  CV stable  cont current tx  PT
Agree with above NP note.  events noted  await carotid stenting   plavix load per neurosx  cont asa, a/c  await carotid stent  patient remains cardiac optimized to proceed wit5h acceptable cardiac risk  antiplatelet per neurosx  hold oral a/c per neuro sx, iv hep when eliquis on hold   med/vasc f/u  cont vol removal with hd
Agree with above NP note.  events noted  neuro eval noted  cont asa, a/c for now   Plan for angio and  poss right carotid stent (pt optimized)  antiplatelet per neurosx  med/vasc f/u  cont a/c  cont vol removal with hd
Agree with above NP note.  events noted  neuro eval noted  cont asa, a/c for now   Plan for angio and  poss right carotid stent (pt optimized)  antiplatelet per neurosx  med/vasc f/u  cont a/c  cont vol removal with hd
Agree with above NP note.  events noted  neuro eval noted  neuro IR called to evaluate for right sided severe ICA disease with known occluded left ica   cont asa, a/c for now   neuro ir planning for poss right carotid stent   would load plavix 600mg today if procedure planned  med/vasc f/u  cont a/c  cont vol removal with hd
Agree with above NP note.  patient in IR for tunn cath   cv stable   s/p avg  hd per renal   eventual restart oral a/c  RESTART ASA 81 TODAY for CAD/RECENT PCI
Agree with above NP note.  cv stable  cont asa   transition to oral a/c if no CI
Agree with above NP note.  events noted  s/p EGD with treatment  on asa, oral a/c  monitor CBC  neuro status unchanged  BP control per neuro  neurosx/vasc noted reviewed  in light of gi bleed on triple therapy, carotid stenting deferred for now due to concern ability to treat post with DAP   await possible CEA  patient remains stable from cardiac standpoint and can proceed for CEA with acceptable cardiac risk  biggest concern for CEA would be neuro risk of atiya/post op CVA/IVH in light of overall status, recent cva on imaging, carotid disease burden, unilateral occlusion of ica and likely diffuse cerebrovascular disease     d/w vasc surgery   family to decide on any potential cea  cont care per neurosx icu
Agree with above NP note.  events noted  unable to place hd cath due to hypoxia  needs more aggressive volume removal with HD  cont rate control
Agree with above NP note.  toerating HD  further HD per renal   continue IV heparin for A/C for AF  asa for cad/pci   clinically appears hypervolemic   iv bumex as needed  cont rate control meds
stroke
discussion with family as to timing of compassionate extubation, assessment for sx prior, during and after extubation, preparing family for what they may witness.

## 2022-06-10 LAB
CULTURE RESULTS: SIGNIFICANT CHANGE UP
SPECIMEN SOURCE: SIGNIFICANT CHANGE UP

## 2022-07-01 NOTE — PROGRESS NOTE ADULT - PROVIDER SPECIALTY LIST ADULT
Attempted without success x2 to call Felicita back. Left voicemail and call back number for her to return my call.    ----- Message from Georgie Shields sent at 7/1/2022 12:11 PM CDT -----  Contact: Felicita Mims is requesting a call at [phone] in regards pt's appt and results.             Thanks  DD       Infectious Disease

## 2022-10-27 NOTE — H&P ADULT - PROBLEM/PLAN-1
Can you please touch base with Alessandro- she is severely underweight- I am awaiting a list of previous medications that her addiction specialist had tried for her so we don't waste more time. She is trying to buy the supplements but they are expensive for her- the addiction specialist actually tried to get these covered before but was not successful. She also has some issues with a home that she recently purchased to renovate and move into-they found mold in the roof and they are working with the city for inspection/remediation. Please let me know if I can be of help. DISPLAY PLAN FREE TEXT

## 2022-11-27 NOTE — OCCUPATIONAL THERAPY INITIAL EVALUATION ADULT - NS ASR OT EQUIP NEEDS DISCH
normal S1, S2 heard TBD at rehab. IF pt went home at this time, would require hospital bed, mechanical lift device (shea lift), jean w/c with elevating leg rests

## 2022-11-30 NOTE — ASU PREOP CHECKLIST - WEIGHT IN KG
[address] [facility] [address] [place]   [address] [facility] [address]  [phone]  [phone]     MRN: [id number] : 1957  Encounter: [id number]  Patient Information: Fifi Ennis  Chief complaint: yearly check up    History of present illness:  81-year-old male with history of rosacea presents for overall checkup  Patient reports that he has not been using the Metrogel because it is not been a problem recently also an area on his left cheek that he has irritated by shaving and has been present for about 6 weeks no other concerns noted  Past Medical History:   Diagnosis Date   • Allergic    • HL (hearing loss)    • Lyme disease      No past surgical history on file  Social History   Social History     Substance and Sexual Activity   Alcohol Use Yes    Comment: a beer 3-4 x a week      Social History     Substance and Sexual Activity   Drug Use No     Social History     Tobacco Use   Smoking Status Never   Smokeless Tobacco Never   Tobacco Comments    denied tabocco use      Family History   Problem Relation Age of Onset   • Depression Mother    • Hypertension Mother    • Allergies Brother      Meds/Allergies   No Known Allergies    Meds:  Prior to Admission medications    Medication Sig Start Date End Date Taking?  Authorizing Provider   albuterol (Ventolin HFA) 90 mcg/act inhaler Inhale 2 puffs every 6 (six) hours as needed for wheezing 20  Yes SHON Rodriguez   Cholecalciferol (VITAMIN D3) 2000 units capsule Take 1 capsule (2,000 Units total) by mouth daily 18  Yes Boston Colmenares, DO   fluticasone [facility]) 50 mcg/act nasal spray 1 spray into each nostril daily 20  Yes SHON Rodriguez   Glucosamine-Chondroitin 250-200 MG CAPS Take 250 mg by mouth 2 (two) times a day 18  Yes Bosotn Colmenares, DO   metroNIDAZOLE (METROGEL) 0 75 % gel Apply topically daily 21  Yes Cristina Vargas MD   [facility] Natural Products (TART CHERRY ADVANCED PO) Take 1,000 mg by mouth daily   Yes Historical Provider, MD   montelukast (SINGULAIR) 10 mg tablet TAKE 1 TABLET DAILY AT BEDTIME 9/26/22  Yes SHON Caldwell   pseudoephedrine (SUDAFED) 60 mg tablet Take 1 tablet by mouth every 6 (six) hours as needed   Yes Historical Provider, MD   Turmeric 500 MG CAPS Take 500 mg by mouth 2 (two) times a day   Yes Historical Provider, MD       Subjective:     Review of Systems:    General: negative for - chills, fatigue, fever,  weight gain or weight loss  Psychological: negative for - anxiety, behavioral disorder, concentration difficulties, decreased libido, depression, irritability, memory difficulties, mood swings, sleep disturbances or suicidal ideation  ENT: negative for - hearing difficulties , nasal congestion, nasal discharge, oral lesions, sinus pain, sneezing, sore throat  Allergy and Immunology: negative for - hives, insect bite sensitivity,  Hematological and Lymphatic: negative for - bleeding problems, blood clots,bruising, swollen lymph nodes  Endocrine: negative for - hair pattern changes, hot flashes, malaise/lethargy, mood swings, palpitations, polydipsia/polyuria, skin changes, temperature intolerance or unexpected weight change  Respiratory: negative for - cough, hemoptysis, orthopnea, shortness of breath, or wheezing  Cardiovascular: negative for - chest pain, dyspnea on exertion, edema,  Gastrointestinal: negative for - abdominal pain, nausea/vomiting  Genito-Urinary: negative for - dysuria, incontinence, irregular/heavy menses or urinary frequency/urgency  Musculoskeletal: negative for - gait disturbance, joint pain, joint stiffness, joint swelling, muscle pain, muscular weakness  Dermatological:  As in HPI  Neurological: negative for confusion, dizziness, headaches, impaired coordination/balance, memory loss, numbness/tingling, seizures, speech problems, tremors or weakness       Objective:   Ht 5' 10" (1 778 m)   Wt 93 9 kg (207 lb)   BMI 29 70 kg/m²     Physical Exam:    General Appearance:    Alert, cooperative, no distress   Head:    Normocephalic, without obvious abnormality, atraumatic           Skin:   A full skin exam was performed including scalp, head scalp, eyes, ears, nose, lips, neck, chest, axilla, abdomen, back, buttocks, bilateral upper extremities, bilateral lower extremities, hands, feet, fingers, toes, fingernails, and toenails no erythema papules or pustules face excoriated 4 mm papule noted on left cheek normal keratotic papules greasy stuck appearance normal pigmented lesions regular shape color nothing else markedly atypical noted on exam     Assessment:     1  Rosacea        2  Nevus        3  Seborrheic keratosis        4  Screening for skin condition              Plan:   Rosacea appears under control patient advised me that in the use the medication on a daily basis to prevent breakouts or not to use at all on as long as he happy with not any activity at this point would hold off on therapy  Nevi reviewed the concept of ABCDE and ugly duckling nothing markedly atypical patient reassured  Seborrheic Keratosis  Patient reasurred these are normal growths we acquire with age no treatment needed  Lesion on the left cheek make being excoriated keratoses cannot really discern at this time patient advise if it does not go away in a month to let us know  Screening for Dermatologic Disorders: Nothing else of concern noted on complete exam follow up in 1 year         Gene Lyle MD  11/30/2022,8:06 AM    Portions of the record may have been created with voice recognition software   Occasional wrong word or "sound a like" substitutions may have occurred due to the inherent limitations of voice recognition software   Read the chart carefully and recognize, using context, where substitutions have occurred  129.3

## 2023-01-23 NOTE — CONSULT NOTE ADULT - PROBLEM/RECOMMENDATION-2
DISPLAY PLAN FREE TEXT Mastoid Interpolation Flap Text: A decision was made to reconstruct the defect utilizing an interpolation axial flap and a staged reconstruction.  A telfa template was made of the defect.  This telfa template was then used to outline the mastoid interpolation flap.  The donor area for the pedicle flap was then injected with anesthesia.  The flap was excised through the skin and subcutaneous tissue down to the layer of the underlying musculature.  The pedicle flap was carefully excised within this deep plane to maintain its blood supply.  The edges of the donor site were undermined.   The donor site was closed in a primary fashion.  The pedicle was then rotated into position and sutured.  Once the tube was sutured into place, adequate blood supply was confirmed with blanching and refill.  The pedicle was then wrapped with xeroform gauze and dressed appropriately with a telfa and gauze bandage to ensure continued blood supply and protect the attached pedicle.

## 2023-05-08 NOTE — PROGRESS NOTE ADULT - PROVIDER SPECIALTY LIST ADULT
Contacted Javier Sanchez and left voicemail regarding Dietitian follow up. Left call back number and will follow up as appropriate.          [address] [place] [address],   [phone] [facility]

## 2023-07-11 NOTE — SWALLOW BEDSIDE ASSESSMENT ADULT - SLP PATIENT PROFILE REVIEW
[FreeTextEntry1] : 46 yo female s/p revision of reconstruction 5/26/23\par doing well\par discussed can plan for tattoo in another 6 weeks\par discussed scar treatment due to hypertrophy, she will try silicone sheets first \par no restrictions\par all questions answered\par she is encouraged to call if anything changes 
yes

## 2023-08-08 NOTE — ED PROVIDER NOTE - MDM ORDERS SUBMITTED SELECTION
RIOS PEREZ approved. Request Reference Number: [id number]. SOFOS/VELPAT -100 is approved through 10/26/2023.     AG Epclusa test claim - $2491.91 copay. Forwarded to PCA team for assistance review.     [facility] brianna obtained per pt permission. Test claim - $0 copay. Max benefit $30,000. Approved 7/8/23 to 7/7/2024.  
Labs/EKG/Imaging Studies/Medications

## 2023-08-21 NOTE — ED PROVIDER NOTE - MUSCULOSKELETAL [+], MLM
CHW - Follow Up and Case Closure    This Community Health Worker completed a follow up visit with patient via telephone today.  Pt reported: received diapers and needs nothing more.   Pt denied any additional needs at this time and agrees with episode closure at this time.    Provided patient with Community Health Worker's contact information and encouraged her to contact this Community Health Worker if additional needs arise.     
left heel pain

## 2023-08-26 NOTE — BRIEF OPERATIVE NOTE - NSICDXBRIEFPOSTOP_GEN_ALL_CORE_FT
[facility] MEDICINE PROGRESS NOTE   Patient: Roberto Argueta  Today's Date: 8/26/2023    YOB: 1991  Admission Date: 8/24/2023    MRN: [id number]  Inpatient LOS: 2    [address]  Hospital Day: Hospital Day: 3    Subjective   HISTORY AND SUBJECTIVE COMPLAINTS     Chief Complaint:   ICD shock    Interval History / Subjective:   Slight headache   No there complaint     Hospital Course:      33yo M pmhx of VT s/p ICD, childhood seizures, who presented to ED after 3 ICD shocks. ICD interrogation in ED. EP call for consult. Placed on amiodarone gtt.     ROS:  Pertinent systems negative except as above.    Objective   PHYSICAL EXAMINATION     Vital 24 Hour Range Most Recent Value   Temperature Temp  Min: 97.2 °F (36.2 °C)  Max: 98.2 °F (36.8 °C) 97.2 °F (36.2 °C)   Pulse Pulse  Min: 72  Max: 81 73   Respiratory Resp  Min: 16  Max: 18 18   Blood Pressure BP  Min: 113/84  Max: 145/81 126/75   Pulse Oximetry SpO2  Min: 95 %  Max: 98 % 98 %   Arterial BP No data recorded     O2 No data recorded       Recorded Intake and Output:    Intake/Output Summary (Last 24 hours) at 8/26/2023 1220  Last data filed at 8/26/2023 1047  Gross per 24 hour   Intake 360 ml   Output 2 ml   Net 358 ml      Recorded Last Stool Occurrence: 1 (08/26/23 1100)     Vital Most Recent Value First Value   Weight 85.4 kg (188 lb 4.8 oz) Weight: 87.6 kg (193 lb 2 oz)   Height 5' 11\" (180.3 cm) Height: 5' 11\" (180.3 cm)   BMI 26.26 N/A     Physical Exam  Vitals reviewed.   Constitutional:       General: He is not in acute distress.     Appearance: He is not diaphoretic.   HENT:      Head: Normocephalic.   Neck:      Vascular: No JVD.   Cardiovascular:      Rate and Rhythm: Normal rate and regular rhythm.      Heart sounds: Normal heart sounds. No murmur heard.  Pulmonary:      Effort: Pulmonary effort is normal. No respiratory distress.      Breath sounds: Normal breath sounds. No wheezing or rales.   Abdominal:       General: Bowel sounds are normal.      Palpations: Abdomen is soft.      Tenderness: There is no abdominal tenderness. There is no guarding or rebound.   Musculoskeletal:         General: No tenderness.      Cervical back: Neck supple.   Skin:     General: Skin is warm and dry.      Findings: No erythema or rash.   Neurological:      Mental Status: He is alert and oriented to person, place, and time.   Psychiatric:         Mood and Affect: Mood and affect normal.         TEST RESULTS     Labs: The Laboratory values listed below have been reviewed and pertinent findings discussed in the Assessment and Plan.  Radiology: Imaging studies have been reviewed and pertinent findings discussed in the Assessment and Plan.     ANCILLARY ORDERS     Diet:  Cardiac Diet  Telemetry: On  Consults:    IP CONSULT TO ELECTROPHYSIOLOGY  Therapy Orders:   No orders of the defined types were placed in this encounter.      Advance Care Planning    ADVANCED DIRECTIVES     Code Status: Full Resuscitation          ASSESSMENT AND PLAN     #Status post ICD shock  #Patient with history of ventricular tachycardia  -EP consulted  -tele  - sotalol stated per EP        #Lactic acidosis   -Do not suspect severe sepsis or septic shock based on presentation   -resolved, no abx at this time           DVT Prophylaxis: early ambulation           DISCHARGE PLANNING        Recommendations for Discharge   SW     PT     OT     SLP        Anticipated discharge destination: Home  Expected Discharge Date: 8/28  Barriers to Discharge: Patient with medical intervention/monitoring requiring further hospitalization. Juliatalol         Misael Crum MD  Hospitalist  8/26/2023  12:20 PM        POST-OP DIAGNOSIS:  Atherosclerosis of native artery of right lower extremity 06-Aug-2020 13:46:22  Taryn Cunha

## 2023-10-12 NOTE — PROGRESS NOTE ADULT - PROVIDER SPECIALTY LIST ADULT
TRANSFER - OUT REPORT:    Verbal report given to Altagracia Martinez RN on Brandy Quinteros  being transferred to [facility] for routine progression of patient care       Report consisted of patient's Situation, Background, Assessment and   Recommendations(SBAR). Information from the following report(s) ED SBAR, Adult Overview, Surgery Report, Intake/Output, MAR, Cardiac Rhythm NSR, and Neuro Assessment was reviewed with the receiving nurse. Brimley Fall Assessment:    Presents to emergency department  because of falls (Syncope, seizure, or loss of consciousness): No  Age > 70: No  Altered Mental Status, Intoxication with alcohol or substance confusion (Disorientation, impaired judgment, poor safety awaremess, or inability to follow instructions): No  Impaired Mobility: Ambulates or transfers with assistive devices or assistance; Unable to ambulate or transer.: No  Nursing Judgement: No          Lines:   Peripheral IV 10/12/23 Right Antecubital (Active)        Opportunity for questions and clarification was provided.       Patient transported with:  Monitor, O2 @ 2lpm, and Tech           Iris Maradiaga RN  10/12/23 8703 Cardiology

## 2023-11-07 NOTE — PROGRESS NOTE ADULT - PROVIDER SPECIALTY LIST ADULT
-- DO NOT REPLY / DO NOT REPLY ALL --  -- Message is from Engagement Center Operations (ECO) --    Offered Waitlist if Available for the Visit Type? No    Caller is requesting an appointment - at a sooner time than what was available.      Caller wants sooner appointment - offered other approved options    Reason for Visit: possible uti    Is the patient currently scheduled? No    Preferred time to be seen: any    Caller Information       Type Contact Phone/Fax    11/07/2023 10:35 AM CST Phone (Incoming) RICH BLAKE (Emergency Contact) [phone]          Alternative phone number: n/a    Can a detailed message be left? Yes    Message Turnaround:     [place]:    Please give this turnaround time to the caller:   \"This message will be sent to [state Provider's name]. The clinical team will fulfill your request as soon as they review your message.\"   Infectious Disease

## 2023-11-09 NOTE — PROGRESS NOTE ADULT - SUBJECTIVE AND OBJECTIVE BOX
CARDIOLOGY FOLLOW UP NOTE - DR. SAGASTUME    Patient Name: DYLAN DEL CASTILLO  Date of Service: 04-11-22    Patient seen and examined  feels better  less dyspnea  more awake     Subjective:    cv: denies chest pain, dyspnea, palpitations, dizziness  pulmonary: denies cough  GI: denies abdominal pain, nausea, vomiting  vascular/legs: no edema   skin: no rash  ROS: otherwise negative   overnight events:      PHYSICAL EXAM:  T(C): 36.6 (04-11-22 @ 11:33), Max: 36.6 (04-10-22 @ 16:59)  HR: 120 (04-11-22 @ 13:20) (79 - 120)  BP: 177/74 (04-11-22 @ 13:20) (142/57 - 182/70)  RR: 18 (04-11-22 @ 11:33) (18 - 18)  SpO2: 96% (04-11-22 @ 11:33) (94% - 99%)  Wt(kg): --  I&O's Summary    10 Apr 2022 07:01 - 11 Apr 2022 07:00  --------------------------------------------------------  IN: 890 mL / OUT: 0 mL / NET: 890 mL    11 Apr 2022 07:01  -  11 Apr 2022 16:14  --------------------------------------------------------  IN: 480 mL / OUT: 0 mL / NET: 480 mL      Daily     Daily     Appearance: Normal	  Cardiovascular: Normal S1 S2, tachy irreg  Respiratory: Lungs clear to auscultation	  Gastrointestinal:  Soft, Non-tender, + BS	  Extremities: Normal range of motion, edema b/l      Home Medications:  allopurinol 100 mg oral tablet: 1 tab(s) orally once a day (03 Apr 2022 06:34)  aspirin 81 mg oral delayed release tablet: 1 tab(s) orally once a day (03 Apr 2022 06:34)  bumetanide 2 mg oral tablet: 1 tab(s) orally once a day (03 Apr 2022 06:34)  insulin glargine 100 units/mL subcutaneous solution: 25 unit(s) subcutaneous once a day (at bedtime) (03 Apr 2022 06:34)  metOLazone 5 mg oral tablet: 1 tab(s) orally 3 times a week (03 Apr 2022 06:34)  metoprolol succinate 50 mg oral tablet, extended release: 2 tab(s) orally once a day (03 Apr 2022 06:34)  NovoLOG 100 units/mL subcutaneous solution: subcutaneous 4 times a day (before meals and at bedtime) (03 Apr 2022 06:34)  NovoLOG FlexPen 100 units/mL injectable solution: 10 unit(s) subcutaneous 3 times a day (03 Apr 2022 06:34)  oxycodone-acetaminophen 5 mg-325 mg oral tablet: 1 tab(s) orally 2 times a day (03 Apr 2022 06:34)  Pradaxa 150 mg oral capsule: 1 cap(s) orally 2 times a day (03 Apr 2022 06:34)  pregabalin 100 mg oral capsule: 1 cap(s) orally 3 times a day (03 Apr 2022 06:34)      MEDICATIONS  (STANDING):  allopurinol 100 milliGRAM(s) Oral daily  aspirin enteric coated 81 milliGRAM(s) Oral daily  atorvastatin 40 milliGRAM(s) Oral at bedtime  chlorhexidine 2% Cloths 1 Application(s) Topical daily  dextrose 5%. 1000 milliLiter(s) (50 mL/Hr) IV Continuous <Continuous>  dextrose 5%. 1000 milliLiter(s) (100 mL/Hr) IV Continuous <Continuous>  dextrose 50% Injectable 25 Gram(s) IV Push once  dextrose 50% Injectable 12.5 Gram(s) IV Push once  dextrose 50% Injectable 25 Gram(s) IV Push once  diltiazem Infusion 10 mG/Hr (10 mL/Hr) IV Continuous <Continuous>  glucagon  Injectable 1 milliGRAM(s) IntraMuscular once  heparin  Infusion. 2200 Unit(s)/Hr (22 mL/Hr) IV Continuous <Continuous>  hydrALAZINE Injectable 10 milliGRAM(s) IV Push four times a day  insulin lispro (ADMELOG) corrective regimen sliding scale   SubCutaneous three times a day before meals  insulin lispro (ADMELOG) corrective regimen sliding scale   SubCutaneous at bedtime  metoprolol succinate  milliGRAM(s) Oral daily  sevelamer carbonate 1600 milliGRAM(s) Oral three times a day with meals      TELEMETRY: 	    ECG:  	  RADIOLOGY:   DIAGNOSTIC TESTING:  [ ] Echocardiogram:  [ ] Catheterization:  [ ] Stress Test:    OTHER: 	    LABS:	 	    CARDIAC MARKERS:                                      10.1   14.00 )-----------( 217      ( 11 Apr 2022 07:02 )             33.7     04-11    133<L>  |  92<L>  |  71<H>  ----------------------------<  225<H>  4.6   |  22  |  6.42<H>    Ca    9.0      11 Apr 2022 07:07      proBNP:   PTT - ( 11 Apr 2022 07:02 )  PTT:71.9 sec  Lipid Profile:   HgA1c:     Creatinine, Serum: 6.42 mg/dL (04-11-22 @ 07:07)  Creatinine, Serum: 4.71 mg/dL (04-10-22 @ 07:29)  Creatinine, Serum: 6.87 mg/dL (04-09-22 @ 16:11)             3-4 drinks

## 2023-11-13 NOTE — DIETITIAN INITIAL EVALUATION ADULT. - PROBLEM SELECTOR PROBLEM 1
Shortness of breath Topical Steroids Counseling: I discussed with the patient that prolonged use of topical steroids can result in the increased appearance of superficial blood vessels (telangiectasias), lightening (hypopigmentation) and thinning of the skin (atrophy).  Patient understands to avoid using high potency steroids in skin folds, the groin or the face.  The patient verbalized understanding of the proper use and possible adverse effects of topical steroids.  All of the patient's questions and concerns were addressed.

## 2024-02-19 NOTE — ED PROVIDER NOTE - MDM ORDERS SUBMITTED SELECTION
Detail Level: Detailed
X Size Of Lesion In Cm (Optional): 0
Incorporate Mauc In Note: Yes
Medications/Labs

## 2024-03-04 NOTE — PROGRESS NOTE ADULT - REASON FOR ADMISSION
Decreased urine output for the past week, leg oedema [Dear  ___] : Dear  [unfilled], [Courtesy Letter:] : I had the pleasure of seeing your patient, [unfilled], in my office today. [Please see my note below.] : Please see my note below. [Sincerely,] : Sincerely, [FreeTextEntry2] : Shaw Bell MD  [FreeTextEntry3] : Homa Delatorre PA-C Department of Otolaryngology [facility] Otolaryng[facility] at [place]   Yann Worley MD, FACS Chief of Otolaryngology at [facility]  Dept. of Otolaryngology [facility]

## 2024-04-15 NOTE — DIETITIAN INITIAL EVALUATION ADULT. - NUTRITION INTERVENTION
AMBULATORY CASE MANAGEMENT NOTE    Name and Relationship of Patient/Support Person: Jessica Nunez - Spouse    CCM Interim Update    Spoke to patient's wife regarding care. She states patient is doing well. His blood pressure remains within normal range. She is uncertain of values. She states he is still experiencing some neck pain and continues to utilize heating pad to improve movement. Advised patient to discuss symptoms at upcoming appointment on 4/19. Also advised patient to contact ACM if symptoms worsen.    Wife reports patient saw NP in March regarding ear pain. She states patient had ears cleaned out and wears hearing aids; however, he is still struggling with hearing loss. He plans to discuss concerns at upcoming appointment.         Adult Patient Profile  Questions/Answers      Flowsheet Row Most Recent Value   Symptoms/Conditions Managed at Home musculoskeletal   Musculoskeletal Symptoms/Conditions joint pain  [neck pain]            Education Documentation  No documentation found.        Lily WADSWORTH  Ambulatory Case Management    4/15/2024, 15:30 EDT   Meals and Snack/Feeding Assistance/Nutrition Education Nutrition Education/Collaboration and Referral of Nutrition Care

## 2024-08-21 NOTE — DISCHARGE NOTE NURSING/CASE MANAGEMENT/SOCIAL WORK - NSDPACMPNY_GEN_ALL_CORE
Family
Render Risk Assessment In Note?: no
Comment: Pt had 2 rounds of prednisone from PCP
Detail Level: Simple

## 2024-08-31 NOTE — ED ADULT TRIAGE NOTE - WEIGHT IN LBS
Returned call to patient after talking with Marion Flores NP  Per Marion I informed him  know that he should be fine. Dr. Steen sent a prescription today for him and he should be almost finished by the time he sees Marion on 3/27 and starts chemo on 3/28.  I ask that he call if symptoms persist or worsen but as of today we could him scheduled as is.  Reviewed upcoming appointments and questions answered to his satisfaction  
287.9
L-elbow joint effusion

## 2025-01-20 NOTE — PHYSICAL THERAPY INITIAL EVALUATION ADULT - STRENGTHENING, PT EVAL
none
Half grade increase in muscle strength x 4 extremities to improve functional mobility in 2 weeks

## 2025-01-23 NOTE — DIETITIAN INITIAL EVALUATION ADULT. - ADD RECOMMEND
Called and spoke with pt. Relayed providers message from below. Pt asked to schedule appt later as he is currently sleeping. Pt stated he will call us back.     Postponing to ensure pt calls us back.   
Having severe hip pain from hip replacement surgery. Patient would like refill of oxycontin. Request routed high priority to provider per patient's request.     Can we leave a detailed message on this number? YES  Phone number patient can be reached at: Cell number on file:    Telephone Information:   Mobile [phone]     Lauren Sanders RN  [facility] Triage    
Per chart review, pt returned call to schedule follow-up appt.    Feb 14, 2025 9:00 AM  (Arrive by 8:40 AM)  Provider Visit with DAVID Mckoy CNP  [facility] ([facility] ) [phone]     Thank you,  Asia Gonzales RN    
Please ensure Faheem is scheduled for a follow-up visit.  I did sent 12 tablets of oxycodone to pharmacy, with reduced dose (now 5 mg Q6H PRN, down from 5-10 mg Q6H PRN). Need for oxycodone should be progressively reduced the further out from surgery he is. On-going prescription is generally not indicated, so we need to discuss how to manage his post-operative pain longer term. Please discuss this with him.  Thanks  
1. Continue consistent carbohydrate, DASH/TLC diet.  2. Provided T2DM and heart failure diet education, handouts provided, RD remains available to reinforce PRN.  3. Consider vitamin C to promote wound healing.  4. BMI alert placed in chart. 5. Will continue to monitor PO intake, labs, weights, BM, skin, clinical course.

## 2025-01-27 NOTE — ED PROVIDER NOTE - GASTROINTESTINAL, MLM
Patient's COPD is with exacerbation noted by worsening of baseline hypoxia currently.  Patient is currently off COPD Pathway. Continue scheduled inhalers  nebulizers and Supplemental oxygen and monitor respiratory status closely.   ====================================  See acute hypoxic/hypercarbia respiratory failure A/P   Abdomen soft, non-tender, no guarding.

## 2025-02-14 NOTE — ED PROVIDER NOTE - NS ED ATTENDING STATEMENT MOD
accepted I have personally performed a face to face diagnostic evaluation on this patient. I have reviewed the ACP note and agree with the history, exam and plan of care, except as noted.

## 2025-03-06 NOTE — PROGRESS NOTE ADULT - PROVIDER SPECIALTY LIST ADULT
Assured pt. That Dr. Andrade will login to virtual visit within next 20 minutes.  Pt. Confirmed she will wait for provider.   Endocrinology

## 2025-03-06 NOTE — PROGRESS NOTE ADULT - REASON FOR ADMISSION
Fenofibrate was discontinued by Dr. Goff 11/20/24. Please assist.    Decreased urine output for the past week, leg oedema

## 2025-03-17 NOTE — DISCHARGE NOTE NURSING/CASE MANAGEMENT/SOCIAL WORK - DATE OF LAST VACCINATION
[facility] Cancer [facility] - Initial Evaluation   Patient: Salvatore Figueroa   YOB: 1976      Location: [facility] HEMATOLOGY AND ONCOLOGY - Provider participated in today's evaluation via telemedicine in [place], [place]. [place] completed today's evaluation from [place].   Date of Evaluation: 4/3/2025      Referral Source: Patrice Jacinto MD   Referral Reason: C61 (ICD-10-CM) - Prostate cancer (HCC)       Genetic Counselor: Stephanie Plascencia MS, [facility]      Salvatore Figueroa was referred for evaluation for the potential of hereditary cancer due to his personal diagnosis. Salvatore has consented to a visit via telehealth.  Personalized risk assessment was performed and genetic testing options were reviewed.  For full details, please see Risk Assessment and Discussion in this document.  Following genetic counseling, Salvatore's action plan is:    DEFERS TESTING: Following discussion, Salvatore did not opt to proceed with testing today.      Relevant Personal Medical History   Salvatore is a 48-year-old male who was diagnosed with very high risk cancer of the prostate at age 48.     Screening history:  Latest colonoscopy: Denies  History of polyps: N/A  Last dermatological exam: A long time ago. For eczema.     Social history:  Tobacco use: Daily. Shared he is working on this.   Alcohol use: Every couple of weeks    General medical history:  Past Medical History:   Diagnosis Date    Elevated PSA     Exercise involving weight training     goes to gym 2 times weekly-- but none in 2 months    MI (myocardial infarction) (HCC) 2016    stent x 1-- followed by dr wayne at [facility]    Sleep apnea     does not wear cpap at        Surgical history:  Past Surgical History:   Procedure Laterality Date    CORONARY STENT PLACEMENT  2016    ORTHOPEDIC SURGERY Bilateral     shoulder surg--left 1997-- right 2010    PROSTATE BIOPSY N/A 01/07/2025    MRI FUSION PROSTATE BIOPSY performed by Elton Roldan MD at [facility] OR     20-Aug-2021

## 2025-03-18 NOTE — PRE-ANESTHESIA EVALUATION ADULT - NSANTHTOBACCOSD_GEN_ALL_CORE
Wound Care   Continue current care., OK to shower., No soaking or bathing for another 2 weeks.  Pain control: PRN  Continue ice packs/elevation  Can continue compression   Continue with physical therapy  Continue activities as tolerated  Maintain posterior hip precautions  Complete DVT ppx ASA BID x 4 weeks   Follow up in 5 weeks   for evaluation WITH  x-ray     Patient is doing extremely well overall.  He has minimal pain he is not walking with any assistive device at all.  He is posting videos of his progress on the Internet.  He is already excited and hoping to plan for his contralateral total hip.   No

## 2025-03-26 NOTE — CONSULT NOTE ADULT - SUBJECTIVE AND OBJECTIVE BOX
HPI:  this 68 y/o M--followed by his nephrologist above--patient with a history of CAD with past multiple PCI, with most recent discharge from [place] in Feb 2022 for non ST elevation MI with LULU of an 85% prox LAD lesion with patient on ASA and now off Plavix per cardiology (only for one month), chronic atrial fibrillation maintained on Pradaxa, essential HTN, type 2 DM previously on oral medications but on insulin, diabetic neuropathy with chronic RIGHT LE venous stasis changes>left, LEFT foot ulcer seen by podiatry, past endarterectomy LEFT CEA in 2014. with patient self referring to the ER following apparently treatment by an urgent care center for an apparent cystitis with hematuria on nitrofurantoin but with patient noting decreased urine output for the past week, and difficulty with B/L coarse tremors for several weeks, with patient having difficulty negotiating his applications on his smart phone (observed by examiner).  Patient with increasing B/L LE oedema and weight gain, with patient with 2 pillow orthopnoea.      Patient with past history of COVID-19 in 12/2021 and has received the COVID-19 vaccine x 2.    NO fever, no chills, no rigors.  No diaphoresis.  No back pain, no tearing back pain. (03 Apr 2022 06:29)    Allergies    nitrofurantoin (Nephrotoxicity)      MEDICATIONS:  MEDICATIONS  (STANDING):  allopurinol 100 milliGRAM(s) Oral daily  apixaban 5 milliGRAM(s) Oral two times a day  aspirin  chewable 81 milliGRAM(s) Oral daily  atorvastatin 40 milliGRAM(s) Oral at bedtime  chlorhexidine 4% Liquid 1 Application(s) Topical <User Schedule>  clopidogrel Tablet 75 milliGRAM(s) Oral daily  insulin lispro (ADMELOG) corrective regimen sliding scale   SubCutaneous every 6 hours  lidocaine   4% Patch 1 Patch Transdermal daily  metoprolol tartrate 12.5 milliGRAM(s) Oral every 12 hours  mupirocin 2% Ointment 1 Application(s) Both Nostrils two times a day  Nephro-reena 1 Tablet(s) Oral daily  ofloxacin 0.3% Solution 1 Drop(s) Right EYE four times a day  pantoprazole  Injectable 40 milliGRAM(s) IV Push every 12 hours  phenylephrine    Infusion 0.5 MICROgram(s)/kG/Min (22.7 mL/Hr) IV Continuous <Continuous>  polyethylene glycol 3350 17 Gram(s) Oral two times a day  povidone iodine 10% Solution 1 Application(s) Topical daily  senna 2 Tablet(s) Oral at bedtime  sucralfate 1 Gram(s) Oral every 12 hours    MEDICATIONS  (PRN):  bisacodyl 5 milliGRAM(s) Oral every 12 hours PRN Constipation    PAST MEDICAL & SURGICAL HISTORY:  HTN (hypertension), benign  HLD (hyperlipidemia)  DM type 2 (diabetes mellitus, type 2)  TIA (transient ischemic attack)  Atrial fibrillation  MI (myocardial infarction)  circa 2014 and 2022.  CAD (coronary artery disease)  Neuropathy  Stage 3 chronic kidney disease  2019 novel coronavirus disease (COVID-19)  12/2021.  Received the COVID-19 vaccine x 2 as of April 2022.  Status post angioplasty with stent  LULU x 3 2/7/2014  S/P carotid endarterectomy  left      REVIEW OF SYSTEMS: (taken from chart notes)  Constitutional: No fever, chills, weight loss, or fatigue  ENMT:  No visual changes; No difficulty hearing, tinnitus, vertigo; No sinus or throat pain  Neck: No pain or stiffness  Respiratory: No cough, wheezing, or hemoptysis; No shortness of breath  Cardiovascular: No chest pain, palpitations, dizziness, orthopnea, PND, or leg swelling  Gastrointestinal: No abdominal or epigastric pain. No  nausea, vomiting, or hematemesis. No diarrhea, constipation, or steatorrhea. No melena or hematochezia  Genitourinary: No dysuria, urinary frequency/hesitancy, or hematuria  Skin: No itching, burning, rashes or lesions   Musculoskeletal: No joint pain or swelling; No muscle, back or extremity pain    Vital Signs Last 24 Hrs  T(C): 36.6 (31 May 2022 11:00), Max: 36.9 (31 May 2022 00:44)  T(F): 97.9 (31 May 2022 11:00), Max: 98.4 (31 May 2022 00:44)  HR: 83 (31 May 2022 13:15) (57 - 115)  BP: 80/51 (31 May 2022 09:00) (80/51 - 150/109)  BP(mean): 60 (31 May 2022 09:00) (60 - 123)  RR: 17 (31 May 2022 13:15) (14 - 26)  SpO2: 94% (31 May 2022 13:15) (92% - 100%)    05-31 @ 07:01  -  05-31 @ 14:25  --------------------------------------------------------  IN: 483.7 mL / OUT: 0 mL / NET: 483.7 mL        PHYSICAL EXAM:    General: Well developed; well nourished; in no acute distress  HEENT: MMM, conjunctiva and sclera clear  Gastrointestinal: Soft, non-tender non-distended; Normal bowel sounds; No rebound or guarding  Extremities: Normal range of motion, No clubbing, cyanosis or edema  Neurological:   Skin: Warm and dry. No obvious rash    LABS:                        6.4    19.15 )-----------( 407      ( 31 May 2022 09:40 )             22.3     05-31    135  |  94<L>  |  68<H>  ----------------------------<  232<H>  3.9   |  22  |  6.99<H>    Ca    8.5      31 May 2022 02:28  Phos  3.2     05-31  Mg     2.1     05-31    TPro  6.1  /  Alb  2.9<L>  /  TBili  0.4  /  DBili  x   /  AST  12  /  ALT  <5<L>  /  AlkPhos  121<H>  05-30    PT/INR - ( 31 May 2022 02:35 )   PT: 28.5 sec;   INR: 2.46 ratio    PTT - ( 31 May 2022 02:35 )  PTT:33.2 sec    RADIOLOGY & ADDITIONAL STUDIES:    Gastroenterology PA Progress Note:  Admitted on:   05-31 @ 07:01 - 05-31 @ 16:25  --------------------------------------------------------  IN: 551.8 mL / OUT: 0 mL / NET: 551.8 mL      We were asked to consult this patient for:     INTERVAL HPI/OVERNIGHT EVENTS:    HPI:  NIGHT HOSPITALIST:   Patient remotely known to me from an admission to [facility] in Feb 2017--assigned to me at this point by the ER to admit this 66 y/o M--followed by his nephrologist above--patient with a history of CAD with past multiple PCI, with most recent discharge from [facility] in Feb 2022 for non ST elevation MI with LULU of an 85% prox LAD lesion with patient on ASA and now off Plavix per cardiology (only for one month), chronic atrial fibrillation maintained on Pradaxa, essential HTN, type 2 DM previously on oral medications but on insulin, diabetic neuropathy with chronic RIGHT LE venous stasis changes>left, LEFT foot ulcer seen by podiatry, past endarterectomy LEFT CEA in 2014. with patient self referring to the ER following apparently treatment by an urgent care center for an apparent cystitis with hematuria on nitrofurantoin but with patient noting decreased urine output for the past week, and difficulty with B/L coarse tremors for several weeks, with patient having difficulty negotiating his applications on his smart phone (observed by examiner).  Patient with increasing B/L LE oedema and weight gain, with patient with 2 pillow orthopnoea.      Patient was recommended for a Prado but patient refused, patient wishing to review the issue with his daughter.    Patient with past history of COVID-19 in 12/2021 and has received the COVID-19 vaccine x 2.    NO fever, no chills, no rigors.  No diaphoresis.  No back pain, no tearing back pain. (03 Apr 2022 06:29)    Allergies    nitrofurantoin (Nephrotoxicity)    Intolerances      MEDICATIONS:  MEDICATIONS  (STANDING):  allopurinol 100 milliGRAM(s) Oral daily  apixaban 5 milliGRAM(s) Oral two times a day  aspirin  chewable 81 milliGRAM(s) Oral daily  atorvastatin 40 milliGRAM(s) Oral at bedtime  chlorhexidine 4% Liquid 1 Application(s) Topical <User Schedule>  insulin lispro (ADMELOG) corrective regimen sliding scale   SubCutaneous every 6 hours  lidocaine   4% Patch 1 Patch Transdermal daily  metoprolol tartrate 12.5 milliGRAM(s) Oral every 12 hours  Nephro-reena 1 Tablet(s) Oral daily  ofloxacin 0.3% Solution 1 Drop(s) Right EYE four times a day  pantoprazole  Injectable 40 milliGRAM(s) IV Push every 12 hours  phenylephrine    Infusion 0.5 MICROgram(s)/kG/Min (22.7 mL/Hr) IV Continuous <Continuous>  polyethylene glycol 3350 17 Gram(s) Oral two times a day  povidone iodine 10% Solution 1 Application(s) Topical daily  senna 2 Tablet(s) Oral at bedtime  sucralfate 1 Gram(s) Oral every 12 hours    MEDICATIONS  (PRN):  bisacodyl 5 milliGRAM(s) Oral every 12 hours PRN Constipation    PAST MEDICAL & SURGICAL HISTORY:  HTN (hypertension), benign      HLD (hyperlipidemia)      DM type 2 (diabetes mellitus, type 2)      TIA (transient ischemic attack)      Atrial fibrillation      MI (myocardial infarction)  circa 2014 and 2022.      CAD (coronary artery disease)      Neuropathy      Stage 3 chronic kidney disease      2019 novel coronavirus disease (COVID-19)  12/2021.  Received the COVID-19 vaccine x 2 as of April 2022.      Status post angioplasty with stent  LULU x 3 2/7/2014      S/P carotid endarterectomy  left        FAMILY HISTORY:  Family history of heart disease  father    Family history of CVA  mother          REVIEW OF SYSTEMS:  Constitutional: No fever, chills, weight loss, or fatigue  ENMT:  No visual changes; No difficulty hearing, tinnitus, vertigo; No sinus or throat pain  Neck: No pain or stiffness  Respiratory: No cough, wheezing, or hemoptysis; No shortness of breath  Cardiovascular: No chest pain, palpitations, dizziness, orthopnea, PND, or leg swelling  Gastrointestinal: No abdominal or epigastric pain. No  nausea, vomiting, or hematemesis. No diarrhea, constipation, or steatorrhea. No melena or hematochezia  Genitourinary: No dysuria, urinary frequency/hesitancy, or hematuria  Skin: No itching, burning, rashes or lesions   Musculoskeletal: No joint pain or swelling; No muscle, back or extremity pain    Vital Signs Last 24 Hrs  T(C): 37.1 (31 May 2022 15:00), Max: 37.1 (31 May 2022 15:00)  T(F): 98.7 (31 May 2022 15:00), Max: 98.7 (31 May 2022 15:00)  HR: 82 (31 May 2022 15:30) (57 - 115)  BP: 80/51 (31 May 2022 09:00) (80/51 - 150/109)  BP(mean): 60 (31 May 2022 09:00) (60 - 123)  RR: 22 (31 May 2022 15:30) (14 - 26)  SpO2: 97% (31 May 2022 15:30) (92% - 100%)    05-31 @ 07:01  -  05-31 @ 16:25  --------------------------------------------------------  IN: 551.8 mL / OUT: 0 mL / NET: 551.8 mL        PHYSICAL EXAM:    General: Well developed; well nourished; in no acute distress  HEENT: MMM, conjunctiva and sclera clear  Gastrointestinal: Soft, non-tender non-distended; Normal bowel sounds; No rebound or guarding  Extremities: Normal range of motion, No clubbing, cyanosis or edema  Neurological: Alert and oriented x3  Skin: Warm and dry. No obvious rash    LABS:                        7.1    19.72 )-----------( 396      ( 31 May 2022 15:48 )             23.5     05-31    135  |  94<L>  |  68<H>  ----------------------------<  232<H>  3.9   |  22  |  6.99<H>    Ca    8.5      31 May 2022 02:28  Phos  3.2     05-31  Mg     2.1     05-31    TPro  6.1  /  Alb  2.9<L>  /  TBili  0.4  /  DBili  x   /  AST  12  /  ALT  <5<L>  /  AlkPhos  121<H>  05-30        PT/INR - ( 31 May 2022 02:35 )   PT: 28.5 sec;   INR: 2.46 ratio         PTT - ( 31 May 2022 02:35 )  PTT:33.2 sec    RADIOLOGY & ADDITIONAL STUDIES:       HPI:  NIGHT HOSPITALIST:   Patient remotely known to me from an admission to [facility] in Feb 2017--assigned to me at this point by the ER to admit this 66 y/o M--followed by his nephrologist above--patient with a history of CAD with past multiple PCI, with most recent discharge from [facility] in Feb 2022 for non ST elevation MI with LULU of an 85% prox LAD lesion with patient on ASA and now off Plavix per cardiology (only for one month), chronic atrial fibrillation maintained on Pradaxa, essential HTN, type 2 DM previously on oral medications but on insulin, diabetic neuropathy with chronic RIGHT LE venous stasis changes>left, LEFT foot ulcer seen by podiatry, past endarterectomy LEFT CEA in 2014. with patient self referring to the ER following apparently treatment by an urgent care center for an apparent cystitis with hematuria on nitrofurantoin but with patient noting decreased urine output for the past week, and difficulty with B/L coarse tremors for several weeks, with patient having difficulty negotiating his applications on his smart phone (observed by examiner).  Patient with increasing B/L LE oedema and weight gain, with patient with 2 pillow orthopnoea.      Patient was recommended for a Prado but patient refused, patient wishing to review the issue with his daughter.    Patient with past history of COVID-19 in 12/2021 and has received the COVID-19 vaccine x 2.    NO fever, no chills, no rigors.  No diaphoresis.  No back pain, no tearing back pain. (03 Apr 2022 06:29)      Case discussed with Dr. Janey Pruett, [facility]  cell: [phone]  long range pager: [phone]  Covering for Department of Gastroenterology  [facility]  [phone]    After hours/weekend coverage [phone]     Gastroenterology PA Progress Note:  Admitted on: 4/3/2022    We were asked to consult this patient for:  new melena/acute anemia    INTERVAL HPI/OVERNIGHT EVENTS: no acute overnight events    HPI:   this 68 y/o M--followed by his nephrologist above--patient with a history of CAD with past multiple PCI, with most recent discharge from [place] in Feb 2022 for non ST elevation MI with LULU of an 85% prox LAD lesion with patient on ASA and now off Plavix per cardiology (only for one month), chronic atrial fibrillation maintained on Pradaxa, essential HTN, type 2 DM previously on oral medications but on insulin, diabetic neuropathy with chronic RIGHT LE venous stasis changes>left, LEFT foot ulcer seen by podiatry, past endarterectomy LEFT CEA in 2014. Patient self referring to the ER following apparently treatment by an urgent care center for an apparent cystitis with hematuria on nitrofurantoin but with patient noting decreased urine output for the past week, and difficulty with B/L coarse tremors for several weeks, with patient having difficulty negotiating his applications on his smart phone (observed by examiner).  Patient with increasing B/L LE oedema and weight gain, with patient with 2 pillow orthopnoea.      Patient was recommended for a Prado but patient refused, patient wishing to review the issue with his daughter.    Patient with past history of COVID-19 in 12/2021 and has received the COVID-19 vaccine x 2.    NO fever, no chills, no rigors.  No diaphoresis.  No back pain, no tearing back pain. (03 Apr 2022 06:29)    Allergies    nitrofurantoin (Nephrotoxicity)    Intolerances      MEDICATIONS:  MEDICATIONS  (STANDING):  allopurinol 100 milliGRAM(s) Oral daily  apixaban 5 milliGRAM(s) Oral two times a day  aspirin  chewable 81 milliGRAM(s) Oral daily  atorvastatin 40 milliGRAM(s) Oral at bedtime  chlorhexidine 4% Liquid 1 Application(s) Topical <User Schedule>  insulin lispro (ADMELOG) corrective regimen sliding scale   SubCutaneous every 6 hours  lidocaine   4% Patch 1 Patch Transdermal daily  metoprolol tartrate 12.5 milliGRAM(s) Oral every 12 hours  Nephro-reena 1 Tablet(s) Oral daily  ofloxacin 0.3% Solution 1 Drop(s) Right EYE four times a day  pantoprazole  Injectable 40 milliGRAM(s) IV Push every 12 hours  phenylephrine    Infusion 0.5 MICROgram(s)/kG/Min (22.7 mL/Hr) IV Continuous <Continuous>  polyethylene glycol 3350 17 Gram(s) Oral two times a day  povidone iodine 10% Solution 1 Application(s) Topical daily  senna 2 Tablet(s) Oral at bedtime  sucralfate 1 Gram(s) Oral every 12 hours    MEDICATIONS  (PRN):  bisacodyl 5 milliGRAM(s) Oral every 12 hours PRN Constipation    PAST MEDICAL & SURGICAL HISTORY:  HTN (hypertension), benign  HLD (hyperlipidemia)  DM type 2 (diabetes mellitus, type 2)  TIA (transient ischemic attack)  Atrial fibrillation  MI (myocardial infarction)  circa 2014 and 2022.  CAD (coronary artery disease)  Neuropathy  Stage 3 chronic kidney disease  2019 novel coronavirus disease (COVID-19)  12/2021.  Received the COVID-19 vaccine x 2 as of April 2022.  Status post angioplasty with stent  LULU x 3 2/7/2014  S/P carotid endarterectomy  left    FAMILY HISTORY:  Family history of heart disease  father    Family history of CVA  mother      REVIEW OF SYSTEMS: taken from chart  Constitutional: No fever, chills, weight loss, or fatigue  ENMT:  No visual changes; No difficulty hearing, tinnitus, vertigo; No sinus or throat pain  Neck: No pain or stiffness  Respiratory: No cough, wheezing, or hemoptysis; No shortness of breath  Cardiovascular: No chest pain, palpitations, dizziness, orthopnea, PND, or leg swelling  Gastrointestinal: No abdominal or epigastric pain. No  nausea, vomiting, or hematemesis. No diarrhea, constipation, or steatorrhea. No melena or hematochezia  Genitourinary: No dysuria, urinary frequency/hesitancy, or hematuria  Skin: No itching, burning, rashes or lesions   Musculoskeletal: No joint pain or swelling; No muscle, back or extremity pain    Vital Signs Last 24 Hrs  T(C): 37.1 (31 May 2022 15:00), Max: 37.1 (31 May 2022 15:00)  T(F): 98.7 (31 May 2022 15:00), Max: 98.7 (31 May 2022 15:00)  HR: 82 (31 May 2022 15:30) (57 - 115)  BP: 80/51 (31 May 2022 09:00) (80/51 - 150/109)  BP(mean): 60 (31 May 2022 09:00) (60 - 123)  RR: 22 (31 May 2022 15:30) (14 - 26)  SpO2: 97% (31 May 2022 15:30) (92% - 100%)    05-31 @ 07:01  -  05-31 @ 16:25  --------------------------------------------------------  IN: 551.8 mL / OUT: 0 mL / NET: 551.8 mL      PHYSICAL EXAM:    General: non toxic appearing  HEENT: MMM, conjunctiva and sclera clear  Gastrointestinal: Soft, non-tender non-distended; Normal bowel sounds; No rebound or guarding  Extremities: Normal range of motion, No clubbing, cyanosis or edema  Neurological: Alert and oriented   Skin: Warm and dry. No obvious rash    LABS:                        7.1    19.72 )-----------( 396      ( 05-31 @ 15:48 )             23.5                6.4    19.15 )-----------( 407      ( 05-31 @ 09:40 )             22.3                7.1    17.08 )-----------( 440      ( 05-31 @ 02:28 )             24.0                7.1    17.38 )-----------( 412      ( 05-30 @ 22:40 )             24.1                8.7    12.42 )-----------( 337      ( 05-29 @ 16:28 )             29.9       05-31    135  |  94<L>  |  68<H>  ----------------------------<  232<H>  3.9   |  22  |  6.99<H>    Ca    8.5      31 May 2022 02:28  Phos  3.2     05-31  Mg     2.1     05-31    TPro  6.1  /  Alb  2.9<L>  /  TBili  0.4  /  DBili  x   /  AST  12  /  ALT  <5<L>  /  AlkPhos  121<H>  05-30    PT/INR - ( 31 May 2022 02:35 )   PT: 28.5 sec;   INR: 2.46 ratio    PTT - ( 31 May 2022 02:35 )  PTT:33.2 sec    RADIOLOGY & ADDITIONAL STUDIES:              [Left] : left shoulder [5 ___] : forward flexion 5[unfilled]/5 [4___] : external rotation 4[unfilled]/5 [5___] : internal rotation 5[unfilled]/5 [[email or web address]] : active forward flexion 100 degrees [de-identified] : active abduction 60 degrees [TWNoteComboBox6] : internal rotation 45 degrees [de-identified] : external rotation 50 degrees [Bilateral] : knee bilaterally [] : no calf tenderness [[email or web address]] : mild anterior tenderness lt knee  rt knee tenderness anterior and proximal tibis

## 2025-05-04 NOTE — PATIENT PROFILE ADULT - NSPROHMDIABETMGMTSTRAT_GEN_A_NUR
Occupational Therapy    Visit Type: initial evaluation  SUBJECTIVE  Patient verbally agrees to allow the following to be present during session: daughter and spouse  \"I am not sure if they're doing a procedure or not\"    Patient / Family Goal: maximize function and return home    Pain   Patient denies pain.    Location: R knee    OBJECTIVE     Cognitive Status   Level of Consciousness   - alert  Arousal Alertness   - appropriate responses to stimuli  Affect/Behavior    - cooperative and pleasant  Orientation    - Oriented to: person, place, time and situation  Functional Communication   - Overall Communication Status: within functional limits    Vitals:  HR: 115 & SpO2: 94% after a short rest following mobility in room.   Pt appearing SOB/fatigued but recovers quickly.        Patient Activity Tolerance: 1 to 1 activity to rest         Bed Mobility  Pt starting/ending session up in chair, did not assess.    Transfers  Assistive devices: gait belt, 2-wheeled walker  - Sit to stand: stand by assist  - Stand to sit: stand by assist      Functional Ambulation  - Assistance: stand by assist  - Assistive device: gait belt and 2-wheeled walker  SBA for safety/stability, pt denies pain however fatigues with light activity.     Activities of Daily Living (ADLs)  Lower Body Dressing:   - Assist: modified independent  - Position: chair  - Assist needed for: thread left lower extremity into underwear and thread right lower extremity into underwear  Toileting:   - Toilet transfer:        - Assist: stand by assist       - Device: gait belt and 2-wheeled walker       - Equipment: grab bar use    Pt fatiguing with donning underwear while seated - cueing pt to pace self and take breaks if needed. Would benefit from AE education for donning LB clothing - pt reports owning a reacher.      Interventions    Treatment provided: ADL training, balance retraining, body mechanics, functional ambulation, transfer training, activity tolerance,  compensatory techniques and energy conservation  Skilled input: verbal instruction/cues  Verbal Consent: Writer verbally educated and received verbal consent for hand placement, positioning of patient, and techniques to be performed today from patient for clothing adjustments for techniques and therapist position for techniques as described above and how they are pertinent to the patient's plan of care.         Education:   - Present and ready to learn: patient  Education provided during session:  - ADL strategies, role of OT, use of adaptive equipment and proper body mechanics  - Results of above outlined education: Verbalizes understanding and Demonstrates understanding    ASSESSMENT   Patient will benefit from inpatient skilled therapy to address current assessed functional limitations and impairments.    Discharge needs based on today's assessment:  - Current level of function: slightly below baseline level of function  - Therapy needs at discharge: therapy 1-3 times per week  - Activities of daily living (ADLs) requiring support at discharge: transfers, ambulation, bathing, toileting, dressing and grooming  - Instrumental activities of daily living (IADLs) requiring support at discharge: home management  - Impairments that require further therapy intervention: pain, strength, activity tolerance and balance  AM-PAC  - Prior Level of Function: IND/MOD I ([facility] 22-24)       Key: MOD A=moderate assistance, IND/MOD I=independent/modified independent  - Generalized Current Level of Function     - Current Self-Cares: 20       Scoring Key= >21 Modified Independent; 20-21 Supervision; 18-19 Minimal assist; 13-17 Moderate assist; 9-12 Max assist; <9 Total assist        Personal Occupations Profile Affected: bathing/showering, lower body dressing, personal hygiene/grooming, toileting/toilet hygiene, functional mobility/transfers, home establishment/managements      Clinical decision making: Low - Patient has few  limitations (1-3), comorbidities and/or complexities, as noted in problem focused assessment noted above, that impact their occupational profile.  Resulting in few treatment options and no task modification consistent with low clinical decision making complexity.    PLAN (while hospitalized)  Suggestions for next session as indicated: Progress transfers/mobility, monitor surgical plans, toileting with cares, AE for LB dressing, monitor vitals, energy conservation handout    OT Frequency: 3-5 x per week      PT/OT Mobility Equipment for Discharge: has 2ww, cane; likely no needs  PT/OT ADL Equipment for Discharge: has reacher and grab bars in shower - may benefit from sock aide     Interventions: functional transfer training, activity tolerance training, compensatory technique education, safety training, transfer training, patient education, therapeutic activity and compensatory techniques  Agreement to plan and goals: patient agrees with goals and treatment plan      GOALS  Review Date: 5/11/2025  Long Term Goals: (to be met by time of discharge from hospital)  Grooming: Patient will complete grooming tasks modified independent.  Upper body dressing: Patient will complete upper body dressing modified independent.  Lower body dressing: Patient will complete lower body dressing modified independent.  Toileting: Patient will complete toileting modified independent.  Bathing: Patient will complete bathingmodified independent Toilet transfer: Patient will complete toilet transfer with modified independent.   Home setting transfer: Patient will complete home setting transfers with modified independent.     Documented in the chart in the following areas: Assessment/Plan.    Patient at End of Session:   Location: in chair  Safety measures: call light within reach, equipment intact and lines intact  Handoff to: nurse      Therapy procedure time and total treatment time can be found documented on the Time Entry flowsheet   blood glucose testing

## 2025-05-06 NOTE — PROVIDER CONTACT NOTE (OTHER) - RECOMMENDATIONS
CDI query/Event Note
HOLD HEPARIN FOR NOW AND PTT STAT , CALL NP WITH PTT RESULT
PT VERBALIZED THE IMPORTANCE OF USING CPAP, PT STILL REFUSING, WILL CONTINUE TO MONITOR
Tylenol 650 mg prn ordered, will continue to monitor
ABG
Give am BP meds early.
Hydralazine 25mg extra dose, will

## 2025-05-12 NOTE — CHART NOTE - NSCHARTNOTEFT_GEN_A_CORE
Patient is a 67y old  Male who presents with a chief complaint of Decreased urine output for the past week, leg oedema (04 Jun 2022 19:51)    Pt s/p RRT tonight for unresponsiveness, per primary RN. Pt's wife and daughter were present at bedside.  Pt was transferred from NSICU to in. telemetry unit  Team arrived at bedside, evaluated pt. and recommendations made. (See Full RRT report)  Pt noted to have fever of 101 rectally, lethargic, tachypneic and tachycardic to 120's in AFib, and had a gurgling cough  CXR done (report pending)  Pt evaluated by provider post RRT. He remained lethargic, did not open eyes to his name, but occasionally moved his Lt leg spontaneously, and Rt leg slightly when touched. He was able to squeeze via Lt. hand, weaker on Rt (hx previous CVA),           Vital Signs Last 24 Hrs  T(C): 37.1 (04 Jun 2022 19:56), Max: 37.2 (04 Jun 2022 11:00)  T(F): 98.7 (04 Jun 2022 19:56), Max: 99 (04 Jun 2022 11:00)  HR: 102 (04 Jun 2022 19:56) (97 - 149)  BP: 136/57 (04 Jun 2022 20:00) (136/57 - 136/57)  BP(mean): --  RR: 20 (04 Jun 2022 20:00) (16 - 24)  SpO2: 94% (04 Jun 2022 20:00) (94% - 100%)      Labs:                          7.7    17.99 )-----------( 276      ( 04 Jun 2022 22:42 )             25.7     06-04    138  |  101  |  31<H>  ----------------------------<  204<H>  4.2   |  25  |  2.92<H>    Ca    8.4      04 Jun 2022 22:42  Phos  1.3     06-04  Mg     1.9     06-04    TPro  6.0  /  Alb  2.8<L>  /  TBili  0.4  /  DBili  x   /  AST  11  /  ALT  <5<L>  /  AlkPhos  113  06-04    CARDIAC MARKERS ( 04 Jun 2022 22:42 )  x     / x     / 28 U/L / x     / 2.1 ng/mL      ABG - ( 04 Jun 2022 22:41 )  pH, Arterial: 7.46  pH, Blood: x     /  pCO2: 38    /  pO2: 206   / HCO3: 27    / Base Excess: 3.0   /  SaO2: 99.8          Radiology:    Physical Exam:  Neurology: Lethargic, did not open eyes to name, able to squeeze with Lt. hand, and move Lt. leg  Respiratory: Decreased BS kiara.  CV: Irregular rate and rhythm, tachycardic  Abdominal: Soft, Non tender, non distended, + BS. NGT in place  MSK: No edema, Rt sided paresis (old CVA), Lt side + hand squeeze, and  spontaneous Lt. leg movement      Assessment & Plan:  HPI:  Briefly, 68 y/o male pt with hx CAD, multiple PCI's, most recently in Feb 2022 for NSTEMI, chronic Atrial Fibrillation, HTN, Type 2 DM, diabetic nephropathy with chronic Rt LE venous stasis changes, Lt. foot ulcer, s/p Left CEA 2014, now found with Rt ICA stenosis.   Pt started on triple therapy, d/b GIB and subseqently underwent duodenal ulcer clipping.  RRT initiated by primary RN for noted for lethargy.   Pt found to be febrile to 101 rectal, and IV Zosyn  and 1 dose of IV Vancomycin given  Pt also given Tylenol 1 Gram IVPB x 1  Dr. Joe King informed of event. Neurosurgery team requested to evaluate pt at bedside in light of acute mental status change post transfer from NSICU. Dr. Desai, Fellow, arrived at bedside, and in discussion with family, pt will be transferred to NSICU for closer monitoring    PLAN:  >Continue to monitor  >Transfer pt to NSICU   >Family aware Patient is calling regarding cancelling an appointment.    Date/Time: 5/12/2025 / 7:30 am     Patient was rescheduled: YES [] NO [x]    Patient requesting call back to reschedule: YES [x] NO []

## 2025-07-16 NOTE — ED ADULT NURSE NOTE - OBJECTIVE STATEMENT
Patient presents with epigastric pain radiating to L sternal area since last night after eating dinner. Patient describes pain as pressure-like. PMH: HTN. Patient AAOx4, speaking in clear and complete sentences. Safety parameters in place. Will continue to assess. Private Auto Walk in

## (undated) DEVICE — PREP CHLORAPREP HI-LITE ORANGE 26ML

## (undated) DEVICE — FOLEY HOLDER STATLOCK 2 WAY ADULT

## (undated) DEVICE — DRSG STERISTRIPS 0.5 X 4"

## (undated) DEVICE — TUBING IV SET GRAVITY 3Y 100" MACRO

## (undated) DEVICE — SUCTION YANKAUER NO CONTROL VENT

## (undated) DEVICE — SOL IRR POUR NS 0.9% 500ML

## (undated) DEVICE — STAPLER SKIN VISI-STAT 35 WIDE

## (undated) DEVICE — TUBING SUCTION 20FT

## (undated) DEVICE — SYR ALLIANCE II INFLATION 60ML

## (undated) DEVICE — CLAMP BULLDOG MIDI 45 DEGREE (GREEN) DISP

## (undated) DEVICE — BLADE SCALPEL SAFETYLOCK #10

## (undated) DEVICE — SUT SILK 4-0 18" G-3

## (undated) DEVICE — SUT SILK 2-0 30" TIES

## (undated) DEVICE — PACK IV START WITH CHG

## (undated) DEVICE — DRSG COBAN 6"

## (undated) DEVICE — SUT POLYSORB 3-0 30" V-20 UNDYED

## (undated) DEVICE — WARMING BLANKET LOWER ADULT

## (undated) DEVICE — GOWN TRIMAX LG

## (undated) DEVICE — SUT SILK 3-0 18" TIES

## (undated) DEVICE — DRSG OPSITE 13.75 X 4"

## (undated) DEVICE — DRAPE INSTRUMENT POUCH 6.75" X 11"

## (undated) DEVICE — SOL IRR POUR H2O 250ML

## (undated) DEVICE — COLONOSCOPE 2416901: Type: DURABLE MEDICAL EQUIPMENT

## (undated) DEVICE — GLV 7.5 PROTEXIS (WHITE)

## (undated) DEVICE — TUBING SUCTION CONN 6FT STERILE

## (undated) DEVICE — SUT BIOSYN 4-0 18" P-12

## (undated) DEVICE — PACK AV FISTULA

## (undated) DEVICE — SPECIMEN CONTAINER 100ML

## (undated) DEVICE — POSITIONER FOAM EGG CRATE ULNAR 2PCS (PINK)

## (undated) DEVICE — BITE BLOCK ADULT 20 X 27MM (GREEN)

## (undated) DEVICE — DRAPE LIGHT HANDLE COVER (BLUE)

## (undated) DEVICE — SOL INJ NS 0.9% 500ML 2 PORT

## (undated) DEVICE — BLADE SCALPEL SAFETYLOCK #15

## (undated) DEVICE — CATH IV SAFE BC 22G X 1" (BLUE)

## (undated) DEVICE — GLV 7 PROTEXIS (WHITE)

## (undated) DEVICE — SUT SILK 0 18" CT-1 (POP-OFF)

## (undated) DEVICE — SUT PROLENE 6-0 30" C-1

## (undated) DEVICE — SENSOR O2 FINGER ADULT

## (undated) DEVICE — DRSG TEGADERM 6"X8"

## (undated) DEVICE — DRAPE TOWEL BLUE 17" X 24"

## (undated) DEVICE — CATH IV SAFE BC 20G X 1.16" (PINK)

## (undated) DEVICE — BALLOON US ENDO

## (undated) DEVICE — BLADE SCALPEL SAFETYLOCK #11

## (undated) DEVICE — MEDICATION LABELS W MARKER

## (undated) DEVICE — SOL INJ NS 0.9% 500ML 1-PORT

## (undated) DEVICE — GLV 8 PROTEXIS (WHITE)

## (undated) DEVICE — SUT PROLENE 6-0 30" BV-1

## (undated) DEVICE — VENODYNE/SCD SLEEVE CALF LARGE